# Patient Record
Sex: FEMALE | Race: AMERICAN INDIAN OR ALASKA NATIVE | NOT HISPANIC OR LATINO | Employment: OTHER | URBAN - METROPOLITAN AREA
[De-identification: names, ages, dates, MRNs, and addresses within clinical notes are randomized per-mention and may not be internally consistent; named-entity substitution may affect disease eponyms.]

---

## 2017-01-02 ENCOUNTER — OFFICE VISIT (OUTPATIENT)
Dept: INTERNAL MEDICINE | Facility: CLINIC | Age: 67
End: 2017-01-02
Payer: COMMERCIAL

## 2017-01-02 VITALS
TEMPERATURE: 97.8 F | WEIGHT: 130 LBS | SYSTOLIC BLOOD PRESSURE: 114 MMHG | BODY MASS INDEX: 20.36 KG/M2 | OXYGEN SATURATION: 95 % | HEART RATE: 90 BPM | DIASTOLIC BLOOD PRESSURE: 60 MMHG

## 2017-01-02 DIAGNOSIS — G89.4 CHRONIC PAIN SYNDROME: ICD-10-CM

## 2017-01-02 DIAGNOSIS — J44.9 CHRONIC OBSTRUCTIVE PULMONARY DISEASE, UNSPECIFIED COPD TYPE (H): ICD-10-CM

## 2017-01-02 DIAGNOSIS — K21.9 GASTROESOPHAGEAL REFLUX DISEASE WITHOUT ESOPHAGITIS: ICD-10-CM

## 2017-01-02 DIAGNOSIS — Z09 HOSPITAL DISCHARGE FOLLOW-UP: Primary | ICD-10-CM

## 2017-01-02 DIAGNOSIS — E11.51 TYPE 2 DIABETES MELLITUS WITH DIABETIC PERIPHERAL ANGIOPATHY WITHOUT GANGRENE, WITHOUT LONG-TERM CURRENT USE OF INSULIN (H): ICD-10-CM

## 2017-01-02 DIAGNOSIS — I10 ESSENTIAL HYPERTENSION WITH GOAL BLOOD PRESSURE LESS THAN 130/80: ICD-10-CM

## 2017-01-02 DIAGNOSIS — N12 PYELONEPHRITIS: ICD-10-CM

## 2017-01-02 PROCEDURE — 99495 TRANSJ CARE MGMT MOD F2F 14D: CPT | Performed by: INTERNAL MEDICINE

## 2017-01-02 RX ORDER — ALBUTEROL SULFATE 0.83 MG/ML
SOLUTION RESPIRATORY (INHALATION)
Qty: 360 ML | Refills: 11 | Status: SHIPPED | OUTPATIENT
Start: 2017-01-02 | End: 2019-05-29

## 2017-01-02 RX ORDER — LEVETIRACETAM 500 MG/1
500 TABLET ORAL 2 TIMES DAILY
Qty: 180 TABLET | Refills: 1 | Status: CANCELLED | OUTPATIENT
Start: 2017-01-02

## 2017-01-02 RX ORDER — SUCRALFATE ORAL 1 G/10ML
1 SUSPENSION ORAL
Qty: 1200 ML | Refills: 2 | Status: SHIPPED | OUTPATIENT
Start: 2017-01-02 | End: 2017-03-31

## 2017-01-02 RX ORDER — ALBUTEROL SULFATE 0.83 MG/ML
SOLUTION RESPIRATORY (INHALATION)
Qty: 360 ML | Refills: 1 | Status: CANCELLED | OUTPATIENT
Start: 2017-01-02

## 2017-01-02 NOTE — PROGRESS NOTES
SUBJECTIVE:                                                    Sonya Foote is a 66 year old female who presents to clinic today for the following health issues:    Hospital Follow-up Visit:    Hospital/Nursing Home/IP Rehab Facility: [facility]  Date of Admission: 12/22/16  Date of Discharge: 12/28/16  Reason(s) for Admission: Fever, dysuria             Problems taking medications regularly:  None       Medication changes since discharge: None       Problems adhering to non-medication therapy:  None    Summary of hospitalization:  See outside records, reviewed and scanned  Diagnostic Tests/Treatments reviewed.  Follow up needed: noted  Other Healthcare Providers Involved in Patient s Care:         None  Update since discharge: stable.     Post Discharge Medication Reconciliation: discharge medications reconciled, continue medications without change.  Plan of care communicated with patient     Coding guidelines for this visit:  Type of Medical   Decision Making Face-to-Face Visit       within 7 Days of discharge Face-to-Face Visit        within 14 days of discharge   Moderate Complexity [id number]   High Complexity 73107 [id number]                 Hospital Course by Problem:     Sepsis secondary to pyelonephritis - Started on ciprofloxacin IV on admission. Due to flank pain on exam, obtained renal U/S 12/22 which showed normal kidneys. Due to temperatures between 102-104 despite tylenol on HOD1 and intractable vomiting, broadened to vanc and cefepime (pencilllin allergy of hives and maybe respiratory compromise per history). Urine cultures from 12/22 showed Klebsiella >100k colonies/cc. Narrowed back to cipro IV 12/24 and discharged on oral cipro to complete through 12/29.    - Cont cipro for a 7 day course to complete on 12/28.    Chest pain secondary to suspected stress cardiomyopathy - Had chest pain of sudden onset that radiated to both arms the night of 12/25. LA was 4.2 so rapid response was  called. EKG showed possible ST elevation in V2. TTE showed new lower EF and minimal septal movement with preserved apical movement. Per cardiology, this pattern was not consistent with coronary anatomy and stress cardiomyopathy due to her infection. Metoprolol 25mg XL restarted 12/26.    - F/u Cardiology clinic in 1-3 weeks with TTE. Referral ordered.  - Restart lisinopril at home.    Persistent vomiting - Started the night of HOD1. Eventually controlled with scheduled pepcid, reglan, zofran, protonix, carafate, NPO status. AXR normal. CT abd/pelvis normal except for possible esophagitis. Well-controlled since 12/23 with scheduled oral pepcid, reglan, zofran, protonix, and sucrulfate. Tried to d/c reglan 12/26 but had nausea/vomiting again 12/27, so it was resumed. Ddx includes PUD (has a history of this, but has been controlled), gastroparesis (has DM2, resolved with reglan), and esophagitis (seen on CT done here 12/23). If EGD needs to be done, cardiology IP here cleared her for procedure.  - Discharged on scheduled reglan and zofran  - Repeat EKG within 7 days to check QTc  - F/u GI for persistent vomiting. Referral ordered.    Normocytic anemia - Hgb stable. PT has sickle cell trait. No signs of bleeding on history of exam. Iron supplement was held while she was septic.            Problem list and histories reviewed & adjusted, as indicated.  Additional history: as documented    Patient Active Problem List   Diagnosis     Hyperlipidemia LDL goal <100     Seizure disorder (H)     ACP (advance care planning)     Osteoporosis     Schizoaffective disorder (H)     AS (sickle cell trait) (H)     Vertigo     Person who has had sex change operation     Claudication in peripheral vascular disease (H)     Intestinal malabsorption     GIB (gastrointestinal bleeding)     Cervicalgia     Health Care Home     Anxiety state     Asthma     Adjustment disorder with depressed mood     Chronic pain syndrome     Open-angle glaucoma      Encounter for counseling     History of colonic polyps     Old myocardial infarction     Iron deficiency anemia     Late effects of cerebrovascular disease     Degeneration of intervertebral disc of lumbosacral region     Thoracic or lumbosacral neuritis or radiculitis     Cerebral infarction due to occlusion or stenosis of carotid artery     Disorder of bone and cartilage     Hereditary and idiopathic peripheral neuropathy     Androgen insensitivity syndrome     PAD (peripheral artery disease) (H)     Essential hypertension     Restless legs syndrome (RLS)     Chronic systolic congestive heart failure (H)     Carotid stenosis, left     Primary osteoarthritis of both hands     Pain in both upper extremities     Atherosclerotic peripheral vascular disease with rest pain (H)     Increased nausea and vomiting     Nausea & vomiting     Essential hypertension with goal blood pressure less than 130/80     Cellulitis of right ankle     Angina pectoris, crescendo (H)     Type 2 diabetes mellitus with diabetic peripheral angiopathy without gangrene, without long-term current use of insulin (H)     Anemia, unspecified type     Critical lower limb ischemia     Testicular feminization     Anxiety disorder due to general medical condition     Anxiety disorder     Central retinal artery occlusion     Lumbosacral radiculitis     Peripheral nerve disease (H)     Osteopenia     Prediabetes     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 (H)     Primary open angle glaucoma of both eyes, severe stage     Pseudophakia of right eye     Cataract, left eye     Diabetes mellitus type 2 without retinopathy (H)     Pyelonephritis     Gastroesophageal reflux disease without esophagitis     Past Surgical History   Procedure Laterality Date     Appendectomy       Tonsillectomy       Cholecystectomy       Orthopedic surgery       broken wrist repair     Breast surgery       right breast bx (benign)     Sinus surgery       cyst  removed     Esophagoscopy, gastroscopy, duodenoscopy (egd), combined  12/14/2012     Procedure: COMBINED ESOPHAGOSCOPY, GASTROSCOPY, DUODENOSCOPY (EGD), BIOPSY SINGLE OR MULTIPLE;  ESOPHAGOSCOPY, GASTROSCOPY, DUODENOSCOPY (EGD), DILATATION ;  Surgeon: Elizabeth Stevenson MD;  Location:  GI     Vascular surgery       Left carotid stent     Esophagoscopy, gastroscopy, duodenoscopy (egd), combined  12/31/2013     Procedure: COMBINED ESOPHAGOSCOPY, GASTROSCOPY, DUODENOSCOPY (EGD), BIOPSY SINGLE OR MULTIPLE;;  Surgeon: Clemente Lopez MD;  Location:  GI     Sex transformation surgery, male to female       1974     Esophagoscopy, gastroscopy, duodenoscopy (egd), combined  4/1/2014     Procedure: COMBINED ESOPHAGOSCOPY, GASTROSCOPY, DUODENOSCOPY (EGD);;  Surgeon: Clemente Lopez MD;  Location:  GI     Endarterectomy femoral  5/23/2014     Procedure: ENDARTERECTOMY FEMORAL;  Surgeon: Jason Joshi MD;  Location:  OR     Esophagoscopy, gastroscopy, duodenoscopy (egd), combined  6/28/2014     Procedure: COMBINED ESOPHAGOSCOPY, GASTROSCOPY, DUODENOSCOPY (EGD);  Surgeon: Clemente Lopez MD;  Location:  GI     Colonoscopy with co2 insufflation N/A 8/20/2014     Procedure: COLONOSCOPY WITH CO2 INSUFFLATION;  Surgeon: Duane, William Charles, MD;  Location: [facility]     Esophagoscopy, gastroscopy, duodenoscopy (egd), combined N/A 8/20/2014     Procedure: COMBINED ESOPHAGOSCOPY, GASTROSCOPY, DUODENOSCOPY (EGD), BIOPSY SINGLE OR MULTIPLE;  Surgeon: Duane, William Charles, MD;  Location: [facility]     Esophagoscopy, gastroscopy, duodenoscopy (egd), combined N/A 8/22/2014     Procedure: COMBINED ESOPHAGOSCOPY, GASTROSCOPY, DUODENOSCOPY (EGD), BIOPSY SINGLE OR MULTIPLE;  Surgeon: Mello Ferrer MD;  Location:  GI     Hc capsule endoscopy N/A 8/25/2014     Procedure: CAPSULE/PILL CAM ENDOSCOPY;  Surgeon: Remy Haskins MD;  [facility]     Colonoscopy N/A 8/25/2014     Procedure: COLONOSCOPY;  Surgeon: Nabil  Mello RAMOS MD;  [facility]: [facility]     Hc capsule endoscopy N/A 10/2/2014     Procedure: CAPSULE/PILL CAM ENDOSCOPY;  Surgeon: Remy Haskins MD;  [facility]     Esophagoscopy, gastroscopy, duodenoscopy (egd), combined N/A 10/2/2014     Procedure: COMBINED ESOPHAGOSCOPY, GASTROSCOPY, DUODENOSCOPY (EGD), BIOPSY SINGLE OR MULTIPLE;  Surgeon: Remy Haskins MD;  [facility]     Angiogram Bilateral 11/21/2014     Procedure: ANGIOGRAM;  Surgeon: Savannah Durant MD;  [facility]     Esophagoscopy, gastroscopy, duodenoscopy (egd), combined Left 12/15/2014     Procedure: COMBINED ESOPHAGOSCOPY, GASTROSCOPY, DUODENOSCOPY (EGD), BIOPSY SINGLE OR MULTIPLE;  Surgeon: Remy Haskins MD;  [facility]     Angiogram Left 1/16/2015     Procedure: ANGIOGRAM;  Surgeon: Savannah Durant MD;  [facility]     Esophagoscopy, gastroscopy, duodenoscopy (egd), combined N/A 2/25/2015     Procedure: COMBINED ENDOSCOPIC ULTRASOUND, ESOPHAGOSCOPY, GASTROSCOPY, DUODENOSCOPY (EGD), FINE NEEDLE ASPIRATE/BIOPSY;  Surgeon: Clemente Lugo MD;  [facility]     Esophagoscopy, gastroscopy, duodenoscopy (egd), combined Left 2/25/2015     Procedure: COMBINED ESOPHAGOSCOPY, GASTROSCOPY, DUODENOSCOPY (EGD), BIOPSY SINGLE OR MULTIPLE;  Surgeon: Clemente Lugo MD;  [facility]     Angiogram Bilateral 9/14/2015     Procedure: ANGIOGRAM;  Surgeon: Savannah Durant MD;  [facility]     Angiogram Left 10/12/2015     Procedure: ANGIOGRAM;  Surgeon: Savannah Durant MD;  [facility]     Amputate toe(s) Right 1/5/2016     Procedure: AMPUTATE TOE(S);  Surgeon: Mello Gaines DPM;  Location:  [facility]     Amputate leg above knee Left 6/11/2016     Procedure: AMPUTATE LEG ABOVE KNEE;  Surgeon: Mello Rodriguez MD;  [facility]     Fasciotomy lower extremity Left 6/10/2016     Procedure: FASCIOTOMY LOWER EXTREMITY;  Surgeon: Mello Rodriguez MD;  [facility]     Angiogram Right 6/6/2016      Procedure: ANGIOGRAM;  Surgeon: Savannah Durant MD;  [facility]     Angioplasty Right 6/6/2016     Procedure: ANGIOPLASTY;  Surgeon: Savannah Durant MD;  [facility]     Esophagoscopy, gastroscopy, duodenoscopy (egd), combined N/A 9/25/2016     Procedure: COMBINED ESOPHAGOSCOPY, GASTROSCOPY, DUODENOSCOPY (EGD);  Surgeon: Aziza Patiño MD;  [facility]     Amputate leg below knee Right 11/7/2016     Procedure: AMPUTATE LEG BELOW KNEE;  Surgeon: Savannah Durant MD;  [facility]     Amputate revision stump lower extremity Right 11/11/2016     Procedure: AMPUTATE REVISION STUMP LOWER EXTREMITY;  Surgeon: Savannah Durant MD;  [facility]     Amputate revision stump lower extremity Right 11/16/2016     Procedure: AMPUTATE REVISION STUMP LOWER EXTREMITY;  Surgeon: Savannah Durant MD;  [facility]       Social History   Substance Use Topics     Smoking status: Former Smoker -- 2.50 packs/day for 30 years     Types: Cigarettes, Cigars     Quit date: 11/01/2000     Smokeless tobacco: Never Used     Alcohol Use: No     Family History   Problem Relation Age of Onset     CANCER Maternal Aunt      leukemia     Schizophrenia Brother      Depression Brother      Suicide Sister      Depression Sister      DIABETES Sister      Dementia Mother      Glaucoma Mother      DIABETES Mother      Coronary Artery Disease Mother      MI     CANCER Maternal Aunt      ovarian     Glaucoma Father      DIABETES Father      Heart Failure Father      Glaucoma Maternal Grandmother      DIABETES Maternal Grandmother      Glaucoma Maternal Grandfather      DIABETES Maternal Grandfather      Glaucoma Paternal Grandmother      DIABETES Paternal Grandmother      Glaucoma Paternal Grandfather      DIABETES Paternal Grandfather      Breast Cancer Sister      CEREBROVASCULAR DISEASE Brother          Current Outpatient Prescriptions   Medication Sig Dispense Refill     ondansetron (ZOFRAN-ODT) 4 MG ODT tab Take 1 tablet (4 mg) by mouth every 6  hours 120 tablet 0     metoprolol (TOPROL-XL) 25 MG 24 hr tablet Take 1 tablet (25 mg) by mouth daily 30 tablet 0     metoclopramide (REGLAN) 5 MG tablet Take 1 tablet (5 mg) by mouth 3 times daily (before meals) 240 tablet 0     pantoprazole (PROTONIX) 40 MG EC tablet Take 1 tablet (40 mg) by mouth 2 times daily Then QD 60 tablet 0     famotidine (PEPCID) 20 MG tablet Take 1 tablet (20 mg) by mouth 2 times daily 60 tablet 0     CYANOCOBALAMIN PO Take 2,000 mcg by mouth daily       gabapentin (NEURONTIN) 300 MG capsule Take 1 capsule (300 mg) by mouth 3 times daily 90 capsule 3     Nutritional Supplements (RENÉE) PACK Take 1 packet by mouth 2 times daily       polyethylene glycol (MIRALAX/GLYCOLAX) powder Take 17 g by mouth daily       fluticasone (FLONASE) 50 MCG/ACT nasal spray Spray 1 spray into both nostrils daily       ADVAIR DISKUS 250-50 MCG/DOSE diskus inhaler Inhale 2 puffs into the lungs daily       calcium citrate-vitamin D (CITRACAL) 315-250 MG-UNIT TABS Take 2 tablets by mouth daily 120 tablet 5     pregabalin (LYRICA) 75 MG capsule Take 1 capsule (75 mg) by mouth 2 times daily (takes at 8 AM and 8 PM) 90 capsule 3     ferrous sulfate (IRON) 325 (65 FE) MG tablet Take 1 tablet (325 mg) by mouth 2 times daily With meals 60 tablet 2     hydrochlorothiazide (MICROZIDE) 12.5 MG capsule Take 1 capsule (12.5 mg) by mouth daily 90 capsule 3     lisinopril (PRINIVIL,ZESTRIL) 2.5 MG tablet Take 1 tablet (2.5 mg) by mouth daily 90 tablet 3     escitalopram (LEXAPRO) 20 MG tablet One per day 90 tablet 3     estradiol (ESTRACE) 1 MG tablet Take 1 tablet (1 mg) by mouth daily 90 tablet 3     levETIRAcetam (KEPPRA) 500 MG tablet Take 1 tablet (500 mg) by mouth 2 times daily (Patient taking differently: Take 500 mg by mouth 3 times daily ) 180 tablet 1     traZODone (DESYREL) 100 MG tablet Take 1 tablet (100 mg) by mouth At Bedtime 90 tablet 3     metFORMIN (GLUCOPHAGE) 500 MG tablet Take 1 tablet twice daily 60  tablet 3     rOPINIRole (REQUIP) 0.25 MG tablet Take 3 tablets (0.75 mg) by mouth At Bedtime 270 tablet 1     aspirin EC 81 MG EC tablet Take 1 tablet (81 mg) by mouth daily 90 tablet 3     clopidogrel (PLAVIX) 75 MG tablet Take 1 tablet (75 mg) by mouth daily (Patient taking differently: Take 75 mg by mouth At Bedtime ) 90 tablet 3     risperiDONE (RISPERDAL) 1 MG tablet Take 0.5 tablets (0.5 mg) by mouth At Bedtime 90 tablet 0     sucralfate (CARAFATE) 1 GM/10ML suspension Take 10 mLs (1 g) by mouth 4 times daily (before meals and nightly) 1200 mL 2     blood glucose monitoring (ONE TOUCH ULTRA 2) meter device kit Use to test blood sugars 3 times daily or as directed. 1 kit 0     blood glucose monitoring (ONE TOUCH ULTRA) test strip Use to test blood sugars 3 times daily or as directed. 3 Box 3     blood glucose monitoring (ONE TOUCH ULTRASOFT) lancets Use to test blood sugar 3 times daily or as directed. 3 Box 3     phenytoin 200 MG CAPS Take 200 mg by mouth 2 times daily (Patient taking differently: Take 200 mg by mouth 2 times daily (takes at 8 AM and 8 PM)) 60 capsule 0     DOCUSATE SODIUM PO Take 100 mg by mouth daily       tamsulosin (FLOMAX) 0.4 MG 24 hr capsule Take 0.4 mg by mouth At Bedtime       dorzolamide (TRUSOPT) 2 % ophthalmic solution Place 1 drop into both eyes 2 times daily        diazepam (VALIUM) 5 MG tablet Take 1 tablet (5 mg) by mouth every 6 hours as needed for anxiety 20 tablet 1     SPIRIVA HANDIHALER 18 MCG inhalation capsule INHALE THE CONTENTS OF 1 CAPSULE VIA INHALATION DEVICE DAILY 30 capsule 2     zinc 50 MG TABS Take 1 tablet by mouth daily       cetirizine (ZYRTEC) 10 MG tablet Take 1 tablet (10 mg) by mouth every evening 90 tablet 3     albuterol (2.5 MG/3ML) 0.083% nebulizer solution INHALE 1 VIAL VIA NEBULIZER EVERY 6 HOURS AS NEEDED (Patient taking differently: INHALE 1 VIAL VIA NEBULIZER 3 times daily) 360 mL 1     nitroglycerin (NITROSTAT) 0.4 MG SL tablet Place 1 tablet  (0.4 mg) under the tongue every 5 minutes as needed for chest pain if you are still having symptoms after 3 doses (15 minutes) call 911. 25 tablet 1     MEDICATION GIVEN BY INTRATHECAL PUMP - INSTRUCTION continuous 4/11/2016 per [facility] in [place] [phone]:  Conc: Bupivacaine 20 mg/mL and Fentanyl 2000 mcg/mL.  Continuous: Bupivacaine 5.052 mg/day and Fentanyl 505.2 mcg/day.  Boluses: Up to 7 boluses per 24-hr period of Bupivicaine 0.5992 mg and Fentanyl 59.9 mcg  Max daily dose: Bupivacaine 9.1973 mg/day and Fentanyl 919.5883 mcg/day    Pump Last Fill Date:  6/30/2016  Pump Refill Date: 9/20/2016       latanoprost (XALATAN) 0.005 % ophthalmic solution Place 1 drop into both eyes At Bedtime 2.5 mL 11     atorvastatin (LIPITOR) 40 MG tablet Take 1 tablet (40 mg) by mouth daily (Patient taking differently: Take 40 mg by mouth At Bedtime ) 90 tablet 3     lactulose (CHRONULAC) 10 GM/15ML solution Take 30 mLs (20 g) by mouth daily as needed for constipation 946 mL 11     order for DME Equipment being ordered: Glucerna daily shakes with each meal 1 Box 11     thin (NO BRAND SPECIFIED) lancets Use to test blood sugar 3 times daily or as directed. 1 Box 10     ORDER FOR DME Equipment being ordered: Nebulizer and supplies 1 Units 0     ORDER FOR DME Equipment being ordered: Power Wheelchair 1 Device 0     ORDER FOR DME Equipment being ordered: Depends briefs 1 Month 11     EASY TOUCH LANCETS 30G/TWIST MISC        Cholecalciferol (VITAMIN D) 2000 UNITS tablet Take 2,000 Units by mouth daily. 100 tablet 3     Multiple Vitamin (MULTIVITAMIN OR) Take 1 tablet by mouth daily        HYDROmorphone (DILAUDID) 2 MG tablet Take 1 tablet (2 mg) by mouth every 3 hours as needed for moderate to severe pain 30 tablet 0     prochlorperazine (COMPAZINE) 10 MG tablet Take 1 tablet (10 mg) by mouth every 8 hours as needed 20 tablet 0     Allergies   Allergen Reactions     Bee Venom      Penicillins  Anaphylaxis     Other reaction(s): Skin Rash and/or Hives     Dilantin [Phenytoin] Other (See Comments)     Generic dilantin only per pt     Iodide Hives     09/11/15: Pt states she can use iodine and doesn't have any problems      Iodine-131      Novocaine [Procaine] Hives     Other reaction(s): Skin Rash and/or Hives     BP Readings from Last 3 Encounters:   12/28/16 115/67   12/21/16 122/74   12/14/16 95/57    Wt Readings from Last 3 Encounters:   12/28/16 131 lb 13.4 oz (59.8 kg)   12/14/16 125 lb (56.7 kg)   12/10/16 125 lb (56.7 kg)            Labs reviewed in EPIC    ROS:  C: NEGATIVE for fever, chills, change in weight  E/M: NEGATIVE for ear, mouth and throat problems  R: NEGATIVE for significant cough or SOB  CV: NEGATIVE for chest pain, palpitations or peripheral edema  GI: NEGATIVE for nausea, abdominal pain, heartburn, or change in bowel habits  : NEGATIVE for frequency, dysuria, or hematuria  H: NEGATIVE for bleeding problems  P: NEGATIVE for changes in mood or affect    OBJECTIVE:                                                    /60 mmHg  Pulse 90  Temp(Src) 97.8  F (36.6  C) (Oral)  Wt 130 lb (58.968 kg)  SpO2 95%  Body mass index is 20.36 kg/(m^2).  GENERAL:  alert and no distress in wheelchair per baseline   RESP: lungs clear to auscultation - no rales, no rhonchi, no wheezes  CV: regular rates and rhythm, normal S1 S2, no S3 or S4 and no click or rub   MS: noted bilateral BKA's  PSYCH: Alert and oriented times 3; speech- coherent , normal rate and volume; able to articulate logical thoughts, able to abstract reason, no tangential thoughts, no hallucinations or delusions, affect- normal       ASSESSMENT/PLAN:                                                    (Z09) Hospital discharge follow-up  (primary encounter diagnosis)  Comment: stable as noted  Plan:     (N12) Pyelonephritis  Comment: stable post therapy  Plan:     (I10) Essential hypertension with goal blood pressure less than  130/80  Comment: at goal on therapy  Plan:     (G89.4) Chronic pain syndrome  Comment: discussed with patient, attempt weening off Dilaudid prescribed per surgery  Plan:     (J44.9) Chronic obstructive pulmonary disease, unspecified COPD type (H)  Comment: refilled per request  Plan: albuterol (2.5 MG/3ML) 0.083% neb solution            (K21.9) Gastroesophageal reflux disease without esophagitis  Comment: as dosed QID  Plan: sucralfate (CARAFATE) 1 GM/10ML suspension            (E11.51) Type 2 diabetes mellitus with diabetic peripheral angiopathy without gangrene, without long-term current use of insulin (H)  Comment:   A1C      4.9   6/6/2016  A1C      4.8   8/11/2015  A1C      4.7   3/6/2015  A1C      4.8   3/2/2015  A1C      5.2   4/1/2014  Plan: order for DME        Stable on therapy      See Patient Instructions    Allan Casey MD  [facility]    THE MEDICATION LIST HAS BEEN FULLY RECONCILED BY THE M.D. AND THE NURSING STAFF.

## 2017-01-02 NOTE — NURSING NOTE
"Chief Complaint   Patient presents with     Hospital F/U       Initial /60 mmHg  Pulse 90  Temp(Src) 97.8  F (36.6  C) (Oral)  Wt 130 lb (58.968 kg)  SpO2 95% Estimated body mass index is 20.36 kg/(m^2) as calculated from the following:    Height as of 12/22/16: 5' 7\" (1.702 m).    Weight as of this encounter: 130 lb (58.968 kg).  BP completed using cuff size: mukesh Hancock CMA      "

## 2017-01-03 ENCOUNTER — CARE COORDINATION (OUTPATIENT)
Dept: GERIATRIC MEDICINE | Facility: CLINIC | Age: 67
End: 2017-01-03

## 2017-01-03 ENCOUNTER — TELEPHONE (OUTPATIENT)
Dept: INTERNAL MEDICINE | Facility: CLINIC | Age: 67
End: 2017-01-03

## 2017-01-03 NOTE — PROGRESS NOTES
1/2/16: Member's MA is showing inactive - unable to enter in MMIS.  CM placed call to [facility] - spoke with Karen - he states all paperwork is in and reinstated member's MA.    MERLINE Bonilla, entered MMIS successfully.      CM spoke with member to inform her that her MA was reactive.      Member agreed to schedule [facility] worker. CM placed call to [facility] and  at [phone] - spoke with Citlali and scheduled with [facility] worker, Emily Goss for 1/24 at 12:30.   There is also a 1x requirement for nurse visit scheduled for 1/26 at 1:30 with Radha Castro.    Relayed this to member - sent letter to member with appt times.     Jamie Sharma RN, N  [employer]

## 2017-01-03 NOTE — PROGRESS NOTES
Mailed copy of care plan to client.  As requested/needed:  emailed CPS to Phyll[facility] for [facility], updated services in access as needed and submitted appropriate referrals/auths, and entered MMIS. Chart was returned to CM.     Mailed copy of CL tool to client, faxed copy to [facility] facility, uploaded into MN[employer] and emailed copy to [facility]yll[facility] for auth.    Irene Lemosatoyoselyn  Case Management Specialist   [facility]   [phone]

## 2017-01-03 NOTE — TELEPHONE ENCOUNTER
Reason for Call:  Home Health Care    Elizabeth S with [facility] called regarding (reason for call): Skilled Nursing / Pt / OT / Speech Therapy    Orders are needed for this patient. all    PT: Eval & Treat    OT: Eval & Treat   &   Speech therapy:  Eval & Treat    Skilled Nursin times a week for two weeks.  1 time a week for two weeks.  5 times as needed.    Pt Provider: maya warren    Phone Number Homecare Nurse can be reached at: [phone] before 9:00pm today.  (If after, please call [phone] - main office - ask for clinic coordinator    Can we leave a detailed message on this number? YES    Phone number patient can be reached at: today before 9:00pm [phone].  After 9:00pm today/forward - [phone]    Best Time: before 9:00pm - see different ph contact information listed above if after 9pm today    Call taken on 1/3/2017 at 5:12 PM by Gris Muñiz

## 2017-01-04 ENCOUNTER — TRANSFERRED RECORDS (OUTPATIENT)
Dept: HEALTH INFORMATION MANAGEMENT | Facility: CLINIC | Age: 67
End: 2017-01-04

## 2017-01-06 ENCOUNTER — DOCUMENTATION ONLY (OUTPATIENT)
Dept: CARE COORDINATION | Facility: CLINIC | Age: 67
End: 2017-01-06

## 2017-01-06 NOTE — PROGRESS NOTES
[facility] now requests orders and shares plan of care/discharge summaries for some patients through [employer].  Please REPLY TO THIS MESSAGE in order to give authorization for orders when needed.  This is considered a verbal order, you will still receive a faxed copy of orders for signature.  Thank you for your assistance in improving collaboration for our patients.    Ms Foote is on several medications that are contraindicated  Reglan with Compazine  Reglan with Resperidol    The following could have severe interactions  Dorzolamide-Timolol with Epipen  Epipen with metoprolol  Lexapro with Reglan    This is for your information to correct. Our protocol is to let you know.

## 2017-01-09 ENCOUNTER — DOCUMENTATION ONLY (OUTPATIENT)
Dept: CARE COORDINATION | Facility: CLINIC | Age: 67
End: 2017-01-09

## 2017-01-09 ENCOUNTER — OFFICE VISIT (OUTPATIENT)
Dept: PULMONOLOGY | Facility: CLINIC | Age: 67
End: 2017-01-09
Attending: INTERNAL MEDICINE
Payer: COMMERCIAL

## 2017-01-09 VITALS
WEIGHT: 135 LBS | HEIGHT: 67 IN | TEMPERATURE: 97.8 F | RESPIRATION RATE: 18 BRPM | DIASTOLIC BLOOD PRESSURE: 80 MMHG | HEART RATE: 82 BPM | OXYGEN SATURATION: 96 % | SYSTOLIC BLOOD PRESSURE: 124 MMHG | BODY MASS INDEX: 21.19 KG/M2

## 2017-01-09 DIAGNOSIS — J41.0 SIMPLE CHRONIC BRONCHITIS (H): Primary | ICD-10-CM

## 2017-01-09 PROCEDURE — 99212 OFFICE O/P EST SF 10 MIN: CPT | Mod: ZF

## 2017-01-09 ASSESSMENT — PAIN SCALES - GENERAL: PAINLEVEL: EXTREME PAIN (8)

## 2017-01-09 NOTE — NURSING NOTE
Chief Complaint   Patient presents with     COPD     Follow up with Sonya concerning her COPD     Allan Pastor CMA at 10:27 AM on 1/9/2017

## 2017-01-09 NOTE — MR AVS SNAPSHOT
After Visit Summary   1/9/2017    Sonya Foote    MRN: [id number]           Patient Information     Date Of Birth          1950        Visit Information        Provider Department      1/9/2017 10:30 AM Alina Jennings MD [facility]        Today's Diagnoses     Simple chronic bronchitis (H)    -  1       Care Instructions    It was nice to see you again today.    Your chronic bronchitis seems stable. I would keep using your medications as written (Advair twice a day, Spiriva once a day, and Nebulizer 4 times a day).    I would recommend restarting your CPAP at the previous settings. It will help your breathing, your energy, and your heart.    Please schedule a follow up with your Cardiologist (Dr. Anderson) as discussed. Your heart failure looks to be under good control today.    Please get a chest xray before you go- your last chest xray had a lot of congestion and I want to make sure that you are getting back to normal.    Alina Jennings         Follow-ups after your visit        Follow-up notes from your care team     Return in about 6 months (around 7/9/2017).      Your next 10 appointments already scheduled     Jan 09, 2017 11:35 AM   (Arrive by 11:20 AM)   XR CHEST 2 VIEWS with UCXR1   [facility] [place] [facility] ([facility])    [address] [place] [id number] [phone]           Please bring a list of your current medicines to your exam. (Include vitamins, minerals and over-thecounter medicines.) Leave your valuables at home.  Tell your doctor if there is a chance you may be pregnant.  You do not need to do anything special for this exam.            Jan 12, 2017  4:40 PM   (Arrive by 4:25 PM)   Return Vascular Visit with Savannah Durant MD   [facility] ([facility])    [address] [place] [id number] [phone]             Jan 18, 2017   Procedure with Clemente Lopez MD   [facility] [place] Endoscopy ([facility])    [address] [address] [phone]           The [facility] is located on the corner of [address] and [address] on the [facility]. It is easily accessible from virtually any point in the [place] area, via [address] and [address].            Mar 03, 2017 10:50 AM   (Arrive by 10:20 AM)   RETURN DIABETES with Michelle Irizarry MD   [facility] ([facility])    [address] [id number] [phone]            Jul 10, 2017 11:00 AM   (Arrive by 10:45 AM)   Return Visit with Alina Jennings MD   [facility] ([facility])    [address] [id number] [phone]              Future tests that were ordered for you today     Open Future Orders        Priority Expected Expires Ordered    X-ray Chest 2 vws* Routine 1/9/2017 1/9/2018 1/9/2017            Who to contact     If you have questions or need follow up information about today's clinic visit or your schedule please contact [facility] directly at [phone].  Normal or non-critical lab and imaging results will be communicated to you by MyChart, letter or phone within 4 business days after the clinic has received the results. If you do not hear from us within 7 days, please contact the clinic through MyChart or phone. If you have a critical or abnormal lab result, we will notify you by phone as soon as possible.  Submit refill requests through [employer] or call your pharmacy and they will forward the refill request to us. Please allow 3 business days for your refill to be completed.          Additional Information About Your Visit        MyChart Information  "    [employer] lets you send messages to your doctor, view your test results, renew your prescriptions, schedule appointments and more. To sign up, go to [email or web address] . Click on \"Log in\" on the left side of the screen, which will take you to the Welcome page. Then click on \"Sign up Now\" on the right side of the page.     You will be asked to enter the access code listed below, as well as some personal information. Please follow the directions to create your username and password.     Your access code is: [email or web address]  Expires: 3/26/2017  6:30 AM     Your access code will  in 90 days. If you need help or a new code, please call your [place] clinic or [phone].        Care EveryWhere ID     This is your Care EveryWhere ID. This could be used by other organizations to access your [place] medical records  [id number]        Your Vitals Were     Pulse Temperature Respirations Height BMI (Body Mass Index) Pulse Oximetry    82 97.8  F (36.6  C) (Oral) 18 1.702 m (5' 7\") 21.14 kg/m2 96%       Blood Pressure from Last 3 Encounters:   17 124/80   17 114/60   16 115/67    Weight from Last 3 Encounters:   17 61.236 kg (135 lb)   17 58.968 kg (130 lb)   16 59.8 kg (131 lb 13.4 oz)                 Today's Medication Changes          These changes are accurate as of: 17 11:11 AM.  If you have any questions, ask your nurse or doctor.               These medicines have changed or have updated prescriptions.        Dose/Directions    atorvastatin 40 MG tablet   Commonly known as:  LIPITOR   This may have changed:  when to take this   Used for:  Hyperlipidemia LDL goal <100        Dose:  40 mg   Take 1 tablet (40 mg) by mouth daily   Quantity:  90 tablet   Refills:  3       clopidogrel 75 MG tablet   Commonly known as:  PLAVIX   This may have changed:  when to take this   Used for:  PAD (peripheral artery disease) (H), UGI bleed, Osteoporosis, Nausea        Dose:  75 mg "   Take 1 tablet (75 mg) by mouth daily   Quantity:  90 tablet   Refills:  3       levETIRAcetam 500 MG tablet   Commonly known as:  KEPPRA   This may have changed:  when to take this   Used for:  Nausea        Dose:  500 mg   Take 1 tablet (500 mg) by mouth 2 times daily   Quantity:  180 tablet   Refills:  1       Phenytoin Sodium Extended 200 MG Caps   This may have changed:  additional instructions   Used for:  Seizure disorder (H)        Dose:  200 mg   Take 200 mg by mouth 2 times daily   Quantity:  60 capsule   Refills:  0                Primary Care Provider Office Phone # Fax #    Allan Casey [phone]       [facility] [address] [place] [address]        Thank you!     Thank you for choosing [facility]  for your care. Our goal is always to provide you with excellent care. Hearing back from our patients is one way we can continue to improve our services. Please take a few minutes to complete the written survey that you may receive in the mail after your visit with us. Thank you!             Your Updated Medication List - Protect others around you: Learn how to safely use, store and throw away your medicines at www.disposemymeds.org.          This list is accurate as of: 1/9/17 11:11 AM.  Always use your most recent med list.                   Brand Name Dispense Instructions for use    ADVAIR DISKUS 250-50 MCG/DOSE diskus inhaler   Generic drug:  fluticasone-salmeterol      Inhale 2 puffs into the lungs daily       albuterol (2.5 MG/3ML) 0.083% neb solution     360 mL    INHALE 1 VIAL VIA NEBULIZER EVERY 6 HOURS AS NEEDED       aspirin 81 MG EC tablet     90 tablet    Take 1 tablet (81 mg) by mouth daily       atorvastatin 40 MG tablet    LIPITOR    90 tablet    Take 1 tablet (40 mg) by mouth daily       blood glucose monitoring meter device kit     1 kit    Use to test blood sugars 3 times daily or as directed.       blood glucose  monitoring test strip    ONE TOUCH ULTRA    3 Box    Use to test blood sugars 3 times daily or as directed.       calcium citrate-vitamin D 315-250 MG-UNIT Tabs per tablet    CITRACAL    120 tablet    Take 2 tablets by mouth daily       cetirizine 10 MG tablet    zyrTEC    90 tablet    Take 1 tablet (10 mg) by mouth every evening       clopidogrel 75 MG tablet    PLAVIX    90 tablet    Take 1 tablet (75 mg) by mouth daily       CYANOCOBALAMIN PO      Take 2,000 mcg by mouth daily       diazepam 5 MG tablet    VALIUM    20 tablet    Take 1 tablet (5 mg) by mouth every 6 hours as needed for anxiety       DOCUSATE SODIUM PO      Take 100 mg by mouth daily       dorzolamide 2 % ophthalmic solution    TRUSOPT     Place 1 drop into both eyes 2 times daily       * EASY TOUCH LANCETS 30G/TWIST Misc          * thin lancets    NO BRAND SPECIFIED    1 Box    Use to test blood sugar 3 times daily or as directed.       * blood glucose monitoring lancets     3 Box    Use to test blood sugar 3 times daily or as directed.       escitalopram 20 MG tablet    LEXAPRO    90 tablet    One per day       estradiol 1 MG tablet    ESTRACE    90 tablet    Take 1 tablet (1 mg) by mouth daily       famotidine 20 MG tablet    PEPCID    60 tablet    Take 1 tablet (20 mg) by mouth 2 times daily       ferrous sulfate 325 (65 FE) MG tablet    IRON    60 tablet    Take 1 tablet (325 mg) by mouth 2 times daily With meals       FLOMAX 0.4 MG capsule   Generic drug:  tamsulosin      Take 0.4 mg by mouth At Bedtime       fluticasone 50 MCG/ACT spray    FLONASE     Spray 1 spray into both nostrils daily       gabapentin 300 MG capsule    NEURONTIN    90 capsule    Take 1 capsule (300 mg) by mouth 3 times daily       hydrochlorothiazide 12.5 MG capsule    MICROZIDE    90 capsule    Take 1 capsule (12.5 mg) by mouth daily       HYDROmorphone 2 MG tablet    DILAUDID    30 tablet    Take 1 tablet (2 mg) by mouth every 3 hours as needed for moderate to severe  pain       RENÉE Pack      Take 1 packet by mouth 2 times daily       lactulose 10 GM/15ML solution    CHRONULAC    946 mL    Take 30 mLs (20 g) by mouth daily as needed for constipation       latanoprost 0.005 % ophthalmic solution    XALATAN    2.5 mL    Place 1 drop into both eyes At Bedtime       levETIRAcetam 500 MG tablet    KEPPRA    180 tablet    Take 1 tablet (500 mg) by mouth 2 times daily       lisinopril 2.5 MG tablet    PRINIVIL/Zestril    90 tablet    Take 1 tablet (2.5 mg) by mouth daily       MEDICATION GIVEN BY INTRATHECAL PUMP - INSTRUCTION      continuous 4/11/2016 per [facility] in [place] [phone]: Conc: Bupivacaine 20 mg/mL and Fentanyl 2000 mcg/mL. Continuous: Bupivacaine 5.052 mg/day and Fentanyl 505.2 mcg/day. Boluses: Up to 7 boluses per 24-hr period of Bupivicaine 0.5992 mg and Fentanyl 59.9 mcg Max daily dose: Bupivacaine 9.1973 mg/day and Fentanyl 919.5883 mcg/day  Pump Last Fill Date:  6/30/2016 Pump Refill Date: 9/20/2016       metFORMIN 500 MG tablet    GLUCOPHAGE    60 tablet    Take 1 tablet twice daily       metoclopramide 5 MG tablet    REGLAN    240 tablet    Take 1 tablet (5 mg) by mouth 3 times daily (before meals)       metoprolol 25 MG 24 hr tablet    TOPROL-XL    30 tablet    Take 1 tablet (25 mg) by mouth daily       MULTIVITAMIN PO      Take 1 tablet by mouth daily       nitroglycerin 0.4 MG sublingual tablet    NITROSTAT    25 tablet    Place 1 tablet (0.4 mg) under the tongue every 5 minutes as needed for chest pain if you are still having symptoms after 3 doses (15 minutes) call 911.       ondansetron 4 MG ODT tab    ZOFRAN-ODT    120 tablet    Take 1 tablet (4 mg) by mouth every 6 hours       * order for DME     1 Month    Equipment being ordered: Depends briefs       * order for DME     1 Device    Equipment being ordered: Power Wheelchair       * order for DME     1 Units    Equipment being ordered: Nebulizer and supplies       *  order for DME     1 Box    Equipment being ordered: Glucerna daily shakes with each meal       * order for DME     1 Device    ACcu check BID       pantoprazole 40 MG EC tablet    PROTONIX    60 tablet    Take 1 tablet (40 mg) by mouth 2 times daily Then QD       Phenytoin Sodium Extended 200 MG Caps     60 capsule    Take 200 mg by mouth 2 times daily       polyethylene glycol powder    MIRALAX/GLYCOLAX     Take 17 g by mouth daily       pregabalin 75 MG capsule    LYRICA    90 capsule    Take 1 capsule (75 mg) by mouth 2 times daily (takes at 8 AM and 8 PM)       prochlorperazine 10 MG tablet    COMPAZINE    20 tablet    Take 1 tablet (10 mg) by mouth every 8 hours as needed       risperiDONE 1 MG tablet    risperDAL    90 tablet    Take 0.5 tablets (0.5 mg) by mouth At Bedtime       rOPINIRole 0.25 MG tablet    REQUIP    270 tablet    Take 3 tablets (0.75 mg) by mouth At Bedtime       SPIRIVA HANDIHALER 18 MCG capsule   Generic drug:  tiotropium     30 capsule    INHALE THE CONTENTS OF 1 CAPSULE VIA INHALATION DEVICE DAILY       sucralfate 1 GM/10ML suspension    CARAFATE    1200 mL    Take 10 mLs (1 g) by mouth 4 times daily (before meals and nightly)       traZODone 100 MG tablet    DESYREL    90 tablet    Take 1 tablet (100 mg) by mouth At Bedtime       vitamin D 2000 UNITS tablet     100 tablet    Take 2,000 Units by mouth daily.       zinc 50 MG Tabs      Take 1 tablet by mouth daily       * Notice:  This list has 8 medication(s) that are the same as other medications prescribed for you. Read the directions carefully, and ask your doctor or other care provider to review them with you.

## 2017-01-09 NOTE — PROGRESS NOTES
[facility] now requests orders and shares plan of care/discharge summaries for some patients through [employer].  Please REPLY TO THIS MESSAGE in order to give authorization for orders when needed.  This is considered a verbal order, you will still receive a faxed copy of orders for signature.  Thank you for your assistance in improving collaboration for our patients.    ORDER    OT evaluation completed. Requesting 4 visits this month to address  strengthening and breathing techniques/energy conservation.

## 2017-01-09 NOTE — Clinical Note
1/9/2017      RE: Sonya Foote  [address]   [place] [address]       CHIEF COMPLAINT:  Chronic bronchitis.      HISTORY OF PRESENT ILLNESS:  The patient is a 66-year-old woman previously seen by me in 03/2015 for chronic bronchitis.  She had previously been seen at [facility], and has been consolidating her care at the [facility] since then.  She reports mostly stable issues with chronic bronchitis.  She does cough on a daily basis.  Most of the time her sputum is clear and easy to mobilize.  She has been on fluticasone/salmeterol, Tiotropium, and is using a nebulizer 4 times a day.  She does report an increase in shortness of breath since her recent hospitalization.  She says she is short of breath with minimal activity such as transferring.  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  She has been home from the rehabilitation center for about a week.  She does transfer on her home, but she lives in an assisted living facility where a significant amount of help is available for her.  The assisted living facility helps administer her medications, helps her shower, empties her commode and can help her to the toilet if necessary.  They also make her bed and do some light housekeeping for her.  She says that despite being short of breath her oxygen levels have mostly been within the normal range.  She was tested during her hospitalization but was not discharged from the hospital with oxygen.  She does have a partner who is on supplemental oxygen and uses her partner's oxygen from time to time.  She has been on a stable water pill.  She is not able to follow daily weights, but does not report any increase in swelling.  She does have a chronic 3 pillow orthopnea but no change recently.      PAST MEDICAL HISTORY:   1.  CVA x3, first in 2005, most recent in 2012.   2.  Seizure disorder.   3.  Heart failure with preserved ejection fraction.    4.  Diabetes.   5.  Hypertension.   6.  Hyperlipidemia.   7.  Chronic bronchitis.   8.  Sickle trait.   9.  Peripheral artery disease.   10.  Coronary artery disease with MI in 09/2016.   11.  Duodenal ulcer.   12.  GERD.   13.  Transgender.   14.  JOSE, on CPAP, not currently using.      FAMILY HISTORY:  Brother has schizophrenia and major depressive disorder.  Sister has diabetes and major depression.  Mother had diabetes and Alzheimer and father had diabetes.      SOCIAL HISTORY:  Has approximately 75-pack-year smoking history, quit in 2000.  She moved from [place] to [place] in 2012, lives in an assisted living facility with her partner.      REVIEW OF SYSTEMS:  A full 14-point review of systems was done and is negative except as noted above in the HPI.      PHYSICAL EXAMINATION:   VITAL SIGNS:  Blood pressure 124/80, pulse is 82, respiratory rate is 18, SpO2 is 96% on room air.  BMI is 21.14.   GENERAL APPEARANCE:  Elderly woman in no distress.   NECK: JVP not appreciated with pt sitting at 90 degrees.  RESPIRATORY:  Good air movement bilaterally.  No wheezes.   CARDIOVASCULAR:  Regular rate and rhythm, normal S1, S2, no murmurs, rubs or gallops.      RESULTS:  Echo from shows LVEF of 35% -40%, inferior akinesis.  Right ventricular function appears normal.  Most recent spirometry shows it was normal with a low DLCO in 03/2015.      Most recent chest x-ray from the hospital shows bilateral increase in bilateral ground-glass opacification (my interpretation).      ASSESSMENT AND PLAN:  The patient is a 66-year-old woman here for a followup of her chronic bronchitis.     1.  Chronic bronchitis.  The patient has a longstanding history of chronic bronchitis as evidenced by chronic daily sputum production in the setting of previous tobacco use.  She is on a stable inhaler package of Fluticasone, salmeterol and Tiotropium.  She is using her nebulizers 4 times a day.  She has been checking her oxygen and has been  staying within a safe range and is not currently needing oxygen supplementation.  I would make no adjustments in her current inhaler package and would encourage her to be as active as she is able.  She is not reporting an increase in shortness of breath.  Although in the setting of recent hospitalization and a new method of transferring, I think her shortness of breath is within the expected range.     2.  Abnormal chest x-ray.  The patient had a chest x-ray during her most recent hospitalization that shows bilateral ground-glass opacification.  I would like to repeat a chest x-ray today to ensure resolution of those changes.  I suspect that they are related to volume overload in the setting of heart failure with preserved ejection fraction and recent MI.  However, I would prefer to confirm that this is improved.  I see no evidence of volume overload on my exam today and her JVP is flat with her sitting upright at 90 degrees.  We will follow up on the chest x-ray and if additional evaluation is required, I will contact the patient.      I will have her come back and see me in approximately 6 months.  In the meantime, she will continue to follow with her other providers as necessary.      I spent 30 minutes in this patient's care encounter, of which 50% was in counseling and coordination of care.         POP WOOD MD             D: 2017 12:19   T: 2017 13:49   MT: HAWK      Name:     SHARATH MERCEDES   MRN:      [id number]        Account:      [id number]   :      1950           Service Date: 2017      Document: [id number]

## 2017-01-09 NOTE — PROGRESS NOTES
CHIEF COMPLAINT:  Chronic bronchitis.      HISTORY OF PRESENT ILLNESS:  The patient is a 66-year-old woman previously seen by me in 03/2015 for chronic bronchitis.  She had previously been seen at [facility], and has been consolidating her care at the [facility] since then.  She reports mostly stable issues with chronic bronchitis.  She does cough on a daily basis.  Most of the time her sputum is clear and easy to mobilize.  She has been on fluticasone/salmeterol, Tiotropium, and is using a nebulizer 4 times a day.  She does report an increase in shortness of breath since her recent hospitalization.  She says she is short of breath with minimal activity such as transferring.  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  She has been home from the rehabilitation center for about a week.  She does transfer on her home, but she lives in an assisted living facility where a significant amount of help is available for her.  The assisted living facility helps administer her medications, helps her shower, empties her commode and can help her to the toilet if necessary.  They also make her bed and do some light housekeeping for her.  She says that despite being short of breath her oxygen levels have mostly been within the normal range.  She was tested during her hospitalization but was not discharged from the hospital with oxygen.  She does have a partner who is on supplemental oxygen and uses her partner's oxygen from time to time.  She has been on a stable water pill.  She is not able to follow daily weights, but does not report any increase in swelling.  She does have a chronic 3 pillow orthopnea but no change recently.      PAST MEDICAL HISTORY:   1.  CVA x3, first in 2005, most recent in 2012.   2.  Seizure disorder.   3.  Heart failure with preserved ejection fraction.   4.  Diabetes.   5.  Hypertension.   6.  Hyperlipidemia.   7.  Chronic bronchitis.   8.   Sickle trait.   9.  Peripheral artery disease.   10.  Coronary artery disease with MI in 09/2016.   11.  Duodenal ulcer.   12.  GERD.   13.  Transgender.   14.  JOSE, on CPAP, not currently using.      FAMILY HISTORY:  Brother has schizophrenia and major depressive disorder.  Sister has diabetes and major depression.  Mother had diabetes and Alzheimer and father had diabetes.      SOCIAL HISTORY:  Has approximately 75-pack-year smoking history, quit in 2000.  She moved from [place] to [place] in 2012, lives in an assisted living facility with her partner.      REVIEW OF SYSTEMS:  A full 14-point review of systems was done and is negative except as noted above in the HPI.      PHYSICAL EXAMINATION:   VITAL SIGNS:  Blood pressure 124/80, pulse is 82, respiratory rate is 18, SpO2 is 96% on room air.  BMI is 21.14.   GENERAL APPEARANCE:  Elderly woman in no distress.   NECK: JVP not appreciated with pt sitting at 90 degrees.  RESPIRATORY:  Good air movement bilaterally.  No wheezes.   CARDIOVASCULAR:  Regular rate and rhythm, normal S1, S2, no murmurs, rubs or gallops.      RESULTS:  Echo from shows LVEF of 35% -40%, inferior akinesis.  Right ventricular function appears normal.  Most recent spirometry shows it was normal with a low DLCO in 03/2015.      Most recent chest x-ray from the hospital shows bilateral increase in bilateral ground-glass opacification (my interpretation).      ASSESSMENT AND PLAN:  The patient is a 66-year-old woman here for a followup of her chronic bronchitis.     1.  Chronic bronchitis.  The patient has a longstanding history of chronic bronchitis as evidenced by chronic daily sputum production in the setting of previous tobacco use.  She is on a stable inhaler package of Fluticasone, salmeterol and Tiotropium.  She is using her nebulizers 4 times a day.  She has been checking her oxygen and has been staying within a safe range and is not currently needing oxygen supplementation.  I would  make no adjustments in her current inhaler package and would encourage her to be as active as she is able.  She is not reporting an increase in shortness of breath.  Although in the setting of recent hospitalization and a new method of transferring, I think her shortness of breath is within the expected range.     2.  Abnormal chest x-ray.  The patient had a chest x-ray during her most recent hospitalization that shows bilateral ground-glass opacification.  I would like to repeat a chest x-ray today to ensure resolution of those changes.  I suspect that they are related to volume overload in the setting of heart failure with preserved ejection fraction and recent MI.  However, I would prefer to confirm that this is improved.  I see no evidence of volume overload on my exam today and her JVP is flat with her sitting upright at 90 degrees.  We will follow up on the chest x-ray and if additional evaluation is required, I will contact the patient.      I will have her come back and see me in approximately 6 months.  In the meantime, she will continue to follow with her other providers as necessary.      I spent 30 minutes in this patient's care encounter, of which 50% was in counseling and coordination of care.         POP WOOD MD             D: 2017 12:19   T: 2017 13:49   MT: HAWK      Name:     SHARATH MERCEDES   MRN:      [id number]        Account:      [id number]   :      1950           Service Date: 2017      Document: [id number]

## 2017-01-09 NOTE — PATIENT INSTRUCTIONS
It was nice to see you again today.    Your chronic bronchitis seems stable. I would keep using your medications as written (Advair twice a day, Spiriva once a day, and Nebulizer 4 times a day).    I would recommend restarting your CPAP at the previous settings. It will help your breathing, your energy, and your heart.    Please schedule a follow up with your Cardiologist (Dr. Anderson) as discussed. Your heart failure looks to be under good control today.    Please get a chest xray before you go- your last chest xray had a lot of congestion and I want to make sure that you are getting back to normal.    Alina Jennings

## 2017-01-16 ENCOUNTER — OFFICE VISIT (OUTPATIENT)
Dept: VASCULAR SURGERY | Facility: CLINIC | Age: 67
End: 2017-01-16

## 2017-01-16 VITALS — DIASTOLIC BLOOD PRESSURE: 64 MMHG | SYSTOLIC BLOOD PRESSURE: 98 MMHG | HEART RATE: 55 BPM

## 2017-01-16 DIAGNOSIS — M79.2 NEUROPATHIC PAIN OF RIGHT LOWER EXTREMITY: Primary | ICD-10-CM

## 2017-01-16 RX ORDER — OXYCODONE AND ACETAMINOPHEN 5; 325 MG/1; MG/1
TABLET ORAL
Qty: 60 TABLET | Refills: 0 | Status: SHIPPED | OUTPATIENT
Start: 2017-01-16 | End: 2017-03-31

## 2017-01-16 ASSESSMENT — PAIN SCALES - GENERAL: PAINLEVEL: EXTREME PAIN (8)

## 2017-01-16 NOTE — Clinical Note
1/16/2017       RE: Sonya Foote  [address]   [place] [facility] [address]     Dear Colleague,    Thank you for referring your patient, Sonya Foote, to the [facility] at [facility]. Please see a copy of my visit note below.    I am Seeing Sonya Foote back after right AKA revision on 11/16/16 for severe PAD.  She still complains of right stump pain and inside swelling.  Had her percocet changed to every 4 hours which is not managing pain control.  In good spirits overall.    BP 98/64 mmHg  Pulse 55      General Appearance: Patient appears well with no distress.      Gross Neuro:  Alert and Oriented.  Appropriate.  Extremities: right stump sutures removed, incision C/D/I       Plan/Assessment:  Follow up prn  Will change percocet back to every 3 hours, will provide her with one month RX  Provide referral to current back pain specialist to help manage her stump pain      Maria Eugenia DOW, CNS  Division of Vascular Surgery  [facility]  Pager [phone]      I have seen and examined this patient with JAMEEL Elizabeth and agree with her attached note.  Follow up prn at this point.    I spent >50% of this 15 min visit in counseling    Again, thank you for allowing me to participate in the care of your patient.      Sincerely,    Savannah Duarnt MD

## 2017-01-16 NOTE — PROGRESS NOTES
I am Seeing Sonya beach after right AKA revision on 11/16/16 for severe PAD.  She still complains of right stump pain and inside swelling.  Had her percocet changed to every 4 hours which is not managing pain control.  In good spirits overall.    BP 98/64 mmHg  Pulse 55      General Appearance: Patient appears well with no distress.      Gross Neuro:  Alert and Oriented.  Appropriate.  Extremities: right stump sutures removed, incision C/D/I       Plan/Assessment:  Follow up prn  Will change percocet back to every 3 hours, will provide her with one month RX  Provide referral to current back pain specialist to help manage her stump pain      Maria Eugenia DOW, CNS  Division of Vascular Surgery  [facility]  Pager [phone]

## 2017-01-16 NOTE — NURSING NOTE
"Chief Complaint   Patient presents with     Vascular Disease     Sonya is here for a follow up on her right leg amputation. Sonya states she's having \"burning pain\"     Haile Curtis LPN    "

## 2017-01-17 ENCOUNTER — CARE COORDINATION (OUTPATIENT)
Dept: GERIATRIC MEDICINE | Facility: CLINIC | Age: 67
End: 2017-01-17

## 2017-01-17 NOTE — PROGRESS NOTES
1/17/17: Cm received call from member -she was checking in to let CM know that she is doing well.   [facility] - member has dr leach on 1/24 - asked CM to reschedule appt.  CM placed call to [facility], spoke with Cindy.  Rescheduled for 2/8 with Emily Goss 9-11 a.m.  Nurse visit rescheduled for 3/3 11-12:30.   CM relayed this to member- she put on her calendar.     Jamie Sharma, RN, N  [facility]

## 2017-01-18 ENCOUNTER — SURGERY (OUTPATIENT)
Age: 67
End: 2017-01-18

## 2017-01-18 RX ADMIN — BENZOCAINE 2 SPRAY: 220 SPRAY, METERED PERIODONTAL at 10:55

## 2017-01-18 RX ADMIN — MIDAZOLAM HYDROCHLORIDE 1 MG: 1 INJECTION, SOLUTION INTRAMUSCULAR; INTRAVENOUS at 10:56

## 2017-01-18 RX ADMIN — FENTANYL CITRATE 25 MCG: 50 INJECTION, SOLUTION INTRAMUSCULAR; INTRAVENOUS at 10:55

## 2017-01-18 RX ADMIN — FENTANYL CITRATE 25 MCG: 50 INJECTION, SOLUTION INTRAMUSCULAR; INTRAVENOUS at 10:57

## 2017-01-18 RX ADMIN — MIDAZOLAM HYDROCHLORIDE 1 MG: 1 INJECTION, SOLUTION INTRAMUSCULAR; INTRAVENOUS at 10:57

## 2017-01-19 ENCOUNTER — CARE COORDINATION (OUTPATIENT)
Dept: GERIATRIC MEDICINE | Facility: CLINIC | Age: 67
End: 2017-01-19

## 2017-01-19 NOTE — PROGRESS NOTES
1/19/17: CM placed call to member to f/u on EGD with biopsy yesterday.  Member states it went well and will know the results in a few weeks.  Member states they are quarantined at the [facility] as their is a stomach virus going on around - she is planning to stay in her home as she cannot afford to get sick.     Jamie Sharma RN, N  [facility]

## 2017-01-19 NOTE — PROGRESS NOTES
1/19/17: CM received vm from ULISES Shine with  [facility] stating that she ordered member a CPAP power cord with [facility] however it was not covered and asked CM to track and follow.  CM placed call to [facility] - spoke with Bessie - she stated that the power cord is not in stock and is a special order.  Bessie states that it is not covered under [facility] and that member put on credit card.  Discussed payment with [facility].  Bessie checked with billing and they will reimburse client and bill under [facility] once they received waiver approval form.  CM updated [employer] and sent to CMS for auth.      CM placed call to Fátima - left vm letting her know.  CM relayed information to member.     Jamie Sharma RN, PHN  [facility]

## 2017-01-24 ENCOUNTER — TELEPHONE (OUTPATIENT)
Dept: INTERNAL MEDICINE | Facility: CLINIC | Age: 67
End: 2017-01-24

## 2017-01-24 ENCOUNTER — OFFICE VISIT (OUTPATIENT)
Dept: INTERNAL MEDICINE | Facility: CLINIC | Age: 67
End: 2017-01-24
Payer: COMMERCIAL

## 2017-01-24 VITALS
SYSTOLIC BLOOD PRESSURE: 113 MMHG | BODY MASS INDEX: 21.14 KG/M2 | TEMPERATURE: 98.3 F | WEIGHT: 135 LBS | OXYGEN SATURATION: 96 % | HEART RATE: 81 BPM | DIASTOLIC BLOOD PRESSURE: 66 MMHG

## 2017-01-24 DIAGNOSIS — E11.51 TYPE 2 DIABETES MELLITUS WITH DIABETIC PERIPHERAL ANGIOPATHY WITHOUT GANGRENE, WITHOUT LONG-TERM CURRENT USE OF INSULIN (H): Primary | ICD-10-CM

## 2017-01-24 DIAGNOSIS — Z89.511 STATUS POST BELOW KNEE AMPUTATION OF RIGHT LOWER EXTREMITY (H): ICD-10-CM

## 2017-01-24 DIAGNOSIS — G89.4 CHRONIC PAIN SYNDROME: ICD-10-CM

## 2017-01-24 DIAGNOSIS — D50.9 IRON DEFICIENCY ANEMIA, UNSPECIFIED IRON DEFICIENCY ANEMIA TYPE: ICD-10-CM

## 2017-01-24 DIAGNOSIS — M79.89 MASS OF SOFT TISSUE: ICD-10-CM

## 2017-01-24 DIAGNOSIS — I10 ESSENTIAL HYPERTENSION WITH GOAL BLOOD PRESSURE LESS THAN 130/80: ICD-10-CM

## 2017-01-24 LAB
ALBUMIN SERPL-MCNC: 3 G/DL (ref 3.4–5)
ALP SERPL-CCNC: 162 U/L (ref 40–150)
ALT SERPL W P-5'-P-CCNC: 35 U/L (ref 0–50)
ANION GAP SERPL CALCULATED.3IONS-SCNC: 7 MMOL/L (ref 3–14)
AST SERPL W P-5'-P-CCNC: 29 U/L (ref 0–45)
BILIRUB SERPL-MCNC: 0.1 MG/DL (ref 0.2–1.3)
BUN SERPL-MCNC: 9 MG/DL (ref 7–30)
CALCIUM SERPL-MCNC: 9.1 MG/DL (ref 8.5–10.1)
CHLORIDE SERPL-SCNC: 108 MMOL/L (ref 94–109)
CO2 SERPL-SCNC: 27 MMOL/L (ref 20–32)
CREAT SERPL-MCNC: 0.45 MG/DL (ref 0.52–1.04)
ERYTHROCYTE [DISTWIDTH] IN BLOOD BY AUTOMATED COUNT: 19.3 % (ref 10–15)
GFR SERPL CREATININE-BSD FRML MDRD: ABNORMAL ML/MIN/1.7M2
GLUCOSE SERPL-MCNC: 72 MG/DL (ref 70–99)
HBA1C MFR BLD: 4.1 % (ref 4.3–6)
HCT VFR BLD AUTO: 33.6 % (ref 35–47)
HGB BLD-MCNC: 10.5 G/DL (ref 11.7–15.7)
MCH RBC QN AUTO: 29.5 PG (ref 26.5–33)
MCHC RBC AUTO-ENTMCNC: 31.3 G/DL (ref 31.5–36.5)
MCV RBC AUTO: 94 FL (ref 78–100)
PLATELET # BLD AUTO: 320 10E9/L (ref 150–450)
POTASSIUM SERPL-SCNC: 3.9 MMOL/L (ref 3.4–5.3)
PROT SERPL-MCNC: 7.4 G/DL (ref 6.8–8.8)
RBC # BLD AUTO: 3.56 10E12/L (ref 3.8–5.2)
SODIUM SERPL-SCNC: 142 MMOL/L (ref 133–144)
WBC # BLD AUTO: 12.4 10E9/L (ref 4–11)

## 2017-01-24 PROCEDURE — 80053 COMPREHEN METABOLIC PANEL: CPT | Performed by: INTERNAL MEDICINE

## 2017-01-24 PROCEDURE — 36415 COLL VENOUS BLD VENIPUNCTURE: CPT | Performed by: INTERNAL MEDICINE

## 2017-01-24 PROCEDURE — 83036 HEMOGLOBIN GLYCOSYLATED A1C: CPT | Performed by: INTERNAL MEDICINE

## 2017-01-24 PROCEDURE — 85027 COMPLETE CBC AUTOMATED: CPT | Performed by: INTERNAL MEDICINE

## 2017-01-24 PROCEDURE — 99214 OFFICE O/P EST MOD 30 MIN: CPT | Performed by: INTERNAL MEDICINE

## 2017-01-24 RX ORDER — LIDOCAINE 50 MG/G
PATCH TOPICAL
Qty: 30 PATCH | Refills: 1 | Status: SHIPPED | OUTPATIENT
Start: 2017-01-24 | End: 2017-03-09

## 2017-01-24 NOTE — NURSING NOTE
"Chief Complaint   Patient presents with     Diabetes       Initial /66 mmHg  Pulse 81  Temp(Src) 98.3  F (36.8  C) (Oral)  Wt 135 lb (61.236 kg)  SpO2 96% Estimated body mass index is 21.14 kg/(m^2) as calculated from the following:    Height as of 1/9/17: 5' 7\" (1.702 m).    Weight as of this encounter: 135 lb (61.236 kg).  BP completed using cuff size: mukesh Hancock CMA      "

## 2017-01-24 NOTE — PROGRESS NOTES
I have seen and examined this patient with JAMEEL Elizabeth and agree with her attached note.  Follow up prn at this point.    I spent >50% of this 15 min visit in counseling

## 2017-01-24 NOTE — PROGRESS NOTES
SUBJECTIVE:                                                    Sonya Foote is a 66 year old female who presents to clinic today for the following health issues    Diabetes Follow-up    Patient is checking blood sugars: twice daily.  Results are as follows: 160-90    Diabetic concerns: None     Symptoms of hypoglycemia (low blood sugar): none     Paresthesias (numbness or burning in feet) or sores: No     Date of last diabetic eye exam: 2016       Amount of exercise or physical activity: None    Problems taking medications regularly: No    Medication side effects: none    Diet: regular (no restrictions)    Other concerns:  1. Masses on both hips x 2 weeks   2. Requesting order for lidocane patches  3. Requesting hgb is checked today    Problem list and histories reviewed & adjusted, as indicated.  Additional history: as documented    Patient Active Problem List   Diagnosis     Hyperlipidemia LDL goal <100     Seizure disorder (H)     ACP (advance care planning)     Osteoporosis     Schizoaffective disorder (H)     AS (sickle cell trait) (H)     Vertigo     Person who has had sex change operation     Claudication in peripheral vascular disease (H)     Intestinal malabsorption     GIB (gastrointestinal bleeding)     Cervicalgia     Health Care Home     Asthma     Adjustment disorder with depressed mood     Chronic pain syndrome     Open-angle glaucoma     Encounter for counseling     History of colonic polyps     Old myocardial infarction     Iron deficiency anemia     Late effects of cerebrovascular disease     Degeneration of intervertebral disc of lumbosacral region     Thoracic or lumbosacral neuritis or radiculitis     Cerebral infarction due to occlusion or stenosis of carotid artery     Disorder of bone and cartilage     Hereditary and idiopathic peripheral neuropathy     Androgen insensitivity syndrome     PAD (peripheral artery disease) (H)     Essential hypertension     Restless legs syndrome (RLS)      Chronic systolic congestive heart failure (H)     Carotid stenosis, left     Primary osteoarthritis of both hands     Pain in both upper extremities     Atherosclerotic peripheral vascular disease with rest pain (H)     Increased nausea and vomiting     Nausea & vomiting     Essential hypertension with goal blood pressure less than 130/80     Cellulitis of right ankle     Angina pectoris, crescendo (H)     Type 2 diabetes mellitus with diabetic peripheral angiopathy without gangrene, without long-term current use of insulin (H)     Anemia, unspecified type     Critical lower limb ischemia     Testicular feminization     Anxiety disorder due to general medical condition     Anxiety disorder     Central retinal artery occlusion     Lumbosacral radiculitis     Peripheral nerve disease (H)     Osteopenia     Prediabetes     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 (H)     Primary open angle glaucoma of both eyes, severe stage     Pseudophakia of right eye     Cataract, left eye     Diabetes mellitus type 2 without retinopathy (H)     Pyelonephritis     Gastroesophageal reflux disease without esophagitis     Chronic obstructive pulmonary disease, unspecified COPD type (H)     Past Surgical History   Procedure Laterality Date     Appendectomy       Tonsillectomy       Cholecystectomy       Orthopedic surgery       broken wrist repair     Breast surgery       right breast bx (benign)     Sinus surgery       cyst removed     Esophagoscopy, gastroscopy, duodenoscopy (egd), combined  12/14/2012     Procedure: COMBINED ESOPHAGOSCOPY, GASTROSCOPY, DUODENOSCOPY (EGD), BIOPSY SINGLE OR MULTIPLE;  ESOPHAGOSCOPY, GASTROSCOPY, DUODENOSCOPY (EGD), DILATATION ;  Surgeon: Elizabeth Stevenson MD;  Location:  GI     Vascular surgery       Left carotid stent     Esophagoscopy, gastroscopy, duodenoscopy (egd), combined  12/31/2013     Procedure: COMBINED ESOPHAGOSCOPY, GASTROSCOPY, DUODENOSCOPY (EGD), BIOPSY SINGLE OR  MULTIPLE;;  Surgeon: Clemente Lopez MD;  Location:  GI     Sex transformation surgery, male to female       1974     Esophagoscopy, gastroscopy, duodenoscopy (egd), combined  4/1/2014     Procedure: COMBINED ESOPHAGOSCOPY, GASTROSCOPY, DUODENOSCOPY (EGD);;  Surgeon: Clemente Lopez MD;  [facility]     Endarterectomy femoral  5/23/2014     Procedure: ENDARTERECTOMY FEMORAL;  Surgeon: Jason Joshi MD;  [facility]     Esophagoscopy, gastroscopy, duodenoscopy (egd), combined  6/28/2014     Procedure: COMBINED ESOPHAGOSCOPY, GASTROSCOPY, DUODENOSCOPY (EGD);  Surgeon: Clemente Lopez MD;  [facility]     Colonoscopy with co2 insufflation N/A 8/20/2014     Procedure: COLONOSCOPY WITH CO2 INSUFFLATION;  Surgeon: Duane, William Charles, MD;  Location: [facility]     Esophagoscopy, gastroscopy, duodenoscopy (egd), combined N/A 8/20/2014     Procedure: COMBINED ESOPHAGOSCOPY, GASTROSCOPY, DUODENOSCOPY (EGD), BIOPSY SINGLE OR MULTIPLE;  Surgeon: Duane, William Charles, MD;  Location: [facility]     Esophagoscopy, gastroscopy, duodenoscopy (egd), combined N/A 8/22/2014     Procedure: COMBINED ESOPHAGOSCOPY, GASTROSCOPY, DUODENOSCOPY (EGD), BIOPSY SINGLE OR MULTIPLE;  Surgeon: Mello Ferrer MD;  Location:  GI     Hc capsule endoscopy N/A 8/25/2014     Procedure: CAPSULE/PILL CAM ENDOSCOPY;  Surgeon: Remy Haskins MD;  [facility]     Colonoscopy N/A 8/25/2014     Procedure: COLONOSCOPY;  Surgeon: Mello Ferrer MD;  [facility]     Hc capsule endoscopy N/A 10/2/2014     Procedure: CAPSULE/PILL CAM ENDOSCOPY;  Surgeon: Remy Haskins MD;  [facility]     Esophagoscopy, gastroscopy, duodenoscopy (egd), combined N/A 10/2/2014     Procedure: COMBINED ESOPHAGOSCOPY, GASTROSCOPY, DUODENOSCOPY (EGD), BIOPSY SINGLE OR MULTIPLE;  Surgeon: Remy Haskins MD;  [facility]     Angiogram Bilateral 11/21/2014     Procedure: ANGIOGRAM;  Surgeon: Savannah Durant MD;  [facility]      Esophagoscopy, gastroscopy, duodenoscopy (egd), combined Left 12/15/2014     Procedure: COMBINED ESOPHAGOSCOPY, GASTROSCOPY, DUODENOSCOPY (EGD), BIOPSY SINGLE OR MULTIPLE;  Surgeon: Remy Hasknis MD;  [facility]     Angiogram Left 1/16/2015     Procedure: ANGIOGRAM;  Surgeon: Savannah Durant MD;  [facility]     Esophagoscopy, gastroscopy, duodenoscopy (egd), combined N/A 2/25/2015     Procedure: COMBINED ENDOSCOPIC ULTRASOUND, ESOPHAGOSCOPY, GASTROSCOPY, DUODENOSCOPY (EGD), FINE NEEDLE ASPIRATE/BIOPSY;  Surgeon: Clemente Lugo MD;  [facility]     Esophagoscopy, gastroscopy, duodenoscopy (egd), combined Left 2/25/2015     Procedure: COMBINED ESOPHAGOSCOPY, GASTROSCOPY, DUODENOSCOPY (EGD), BIOPSY SINGLE OR MULTIPLE;  Surgeon: Clemente Lugo MD;  [facility]     Angiogram Bilateral 9/14/2015     Procedure: ANGIOGRAM;  Surgeon: Savannah Durant MD;  [facility]     Angiogram Left 10/12/2015     Procedure: ANGIOGRAM;  Surgeon: Savannah Durant MD;  [facility]     Amputate toe(s) Right 1/5/2016     Procedure: AMPUTATE TOE(S);  Surgeon: Mello Gaines DPM;  Location:  [facility]     Amputate leg above knee Left 6/11/2016     Procedure: AMPUTATE LEG ABOVE KNEE;  Surgeon: Mello Rodriguez MD;  [facility]     Fasciotomy lower extremity Left 6/10/2016     Procedure: FASCIOTOMY LOWER EXTREMITY;  Surgeon: Mello Rodriguez MD;  [facility]     Angiogram Right 6/6/2016     Procedure: ANGIOGRAM;  Surgeon: Savannah Durant MD;  [facility]     Angioplasty Right 6/6/2016     Procedure: ANGIOPLASTY;  Surgeon: Savannah Durant MD;  [facility]     Esophagoscopy, gastroscopy, duodenoscopy (egd), combined N/A 9/25/2016     Procedure: COMBINED ESOPHAGOSCOPY, GASTROSCOPY, DUODENOSCOPY (EGD);  Surgeon: Aziza Patiño MD;  [facility]     Amputate leg below knee Right 11/7/2016     Procedure: AMPUTATE LEG BELOW KNEE;  Surgeon: Savannah Durant MD;  Location:  OR     Amputate  revision stump lower extremity Right 11/11/2016     Procedure: AMPUTATE REVISION STUMP LOWER EXTREMITY;  Surgeon: Savannah Durant MD;  [facility]     Amputate revision stump lower extremity Right 11/16/2016     Procedure: AMPUTATE REVISION STUMP LOWER EXTREMITY;  Surgeon: Savannah Durant MD;  [facility]     Esophagoscopy, gastroscopy, duodenoscopy (egd), combined N/A 1/18/2017     Procedure: COMBINED ESOPHAGOSCOPY, GASTROSCOPY, DUODENOSCOPY (EGD), BIOPSY SINGLE OR MULTIPLE;  Surgeon: Clemente Lopez MD;  [facility]       Social History   Substance Use Topics     Smoking status: Former Smoker -- 2.50 packs/day for 30 years     Types: Cigarettes, Cigars     Quit date: 11/01/2000     Smokeless tobacco: Never Used     Alcohol Use: No     Family History   Problem Relation Age of Onset     CANCER Maternal Aunt      leukemia     Schizophrenia Brother      Depression Brother      Suicide Sister      Depression Sister      DIABETES Sister      Dementia Mother      Glaucoma Mother      DIABETES Mother      Coronary Artery Disease Mother      MI     CANCER Maternal Aunt      ovarian     Glaucoma Father      DIABETES Father      Heart Failure Father      Glaucoma Maternal Grandmother      DIABETES Maternal Grandmother      Glaucoma Maternal Grandfather      DIABETES Maternal Grandfather      Glaucoma Paternal Grandmother      DIABETES Paternal Grandmother      Glaucoma Paternal Grandfather      DIABETES Paternal Grandfather      Breast Cancer Sister      CEREBROVASCULAR DISEASE Brother          Current Outpatient Prescriptions   Medication Sig Dispense Refill     oxyCODONE-acetaminophen (PERCOCET) 5-325 MG per tablet Take 1-2 tablets every 3 hours as needed for pain. 60 tablet 0     albuterol (2.5 MG/3ML) 0.083% neb solution INHALE 1 VIAL VIA NEBULIZER EVERY 6 HOURS AS NEEDED 360 mL 11     sucralfate (CARAFATE) 1 GM/10ML suspension Take 10 mLs (1 g) by mouth 4 times daily (before meals and nightly) 1200 mL 2      order for DME [facility] check BID 1 Device 0     ondansetron (ZOFRAN-ODT) 4 MG ODT tab Take 1 tablet (4 mg) by mouth every 6 hours 120 tablet 0     metoprolol (TOPROL-XL) 25 MG 24 hr tablet Take 1 tablet (25 mg) by mouth daily 30 tablet 0     metoclopramide (REGLAN) 5 MG tablet Take 1 tablet (5 mg) by mouth 3 times daily (before meals) 240 tablet 0     pantoprazole (PROTONIX) 40 MG EC tablet Take 1 tablet (40 mg) by mouth 2 times daily Then QD 60 tablet 0     famotidine (PEPCID) 20 MG tablet Take 1 tablet (20 mg) by mouth 2 times daily 60 tablet 0     CYANOCOBALAMIN PO Take 2,000 mcg by mouth daily       gabapentin (NEURONTIN) 300 MG capsule Take 1 capsule (300 mg) by mouth 3 times daily 90 capsule 3     Nutritional Supplements (RENÉE) PACK Take 1 packet by mouth 2 times daily       polyethylene glycol (MIRALAX/GLYCOLAX) powder Take 17 g by mouth daily       HYDROmorphone (DILAUDID) 2 MG tablet Take 1 tablet (2 mg) by mouth every 3 hours as needed for moderate to severe pain 30 tablet 0     fluticasone (FLONASE) 50 MCG/ACT nasal spray Spray 1 spray into both nostrils daily       ADVAIR DISKUS 250-50 MCG/DOSE diskus inhaler Inhale 2 puffs into the lungs daily       calcium citrate-vitamin D (CITRACAL) 315-250 MG-UNIT TABS Take 2 tablets by mouth daily 120 tablet 5     pregabalin (LYRICA) 75 MG capsule Take 1 capsule (75 mg) by mouth 2 times daily (takes at 8 AM and 8 PM) 90 capsule 3     ferrous sulfate (IRON) 325 (65 FE) MG tablet Take 1 tablet (325 mg) by mouth 2 times daily With meals 60 tablet 2     hydrochlorothiazide (MICROZIDE) 12.5 MG capsule Take 1 capsule (12.5 mg) by mouth daily 90 capsule 3     lisinopril (PRINIVIL,ZESTRIL) 2.5 MG tablet Take 1 tablet (2.5 mg) by mouth daily 90 tablet 3     escitalopram (LEXAPRO) 20 MG tablet One per day 90 tablet 3     estradiol (ESTRACE) 1 MG tablet Take 1 tablet (1 mg) by mouth daily 90 tablet 3     levETIRAcetam (KEPPRA) 500 MG tablet Take 1 tablet (500 mg) by mouth 2  times daily (Patient taking differently: Take 500 mg by mouth 3 times daily ) 180 tablet 1     traZODone (DESYREL) 100 MG tablet Take 1 tablet (100 mg) by mouth At Bedtime 90 tablet 3     metFORMIN (GLUCOPHAGE) 500 MG tablet Take 1 tablet twice daily 60 tablet 3     rOPINIRole (REQUIP) 0.25 MG tablet Take 3 tablets (0.75 mg) by mouth At Bedtime 270 tablet 1     aspirin EC 81 MG EC tablet Take 1 tablet (81 mg) by mouth daily 90 tablet 3     clopidogrel (PLAVIX) 75 MG tablet Take 1 tablet (75 mg) by mouth daily (Patient taking differently: Take 75 mg by mouth At Bedtime ) 90 tablet 3     risperiDONE (RISPERDAL) 1 MG tablet Take 0.5 tablets (0.5 mg) by mouth At Bedtime 90 tablet 0     blood glucose monitoring (ONE TOUCH ULTRA 2) meter device kit Use to test blood sugars 3 times daily or as directed. 1 kit 0     blood glucose monitoring (ONE TOUCH ULTRA) test strip Use to test blood sugars 3 times daily or as directed. 3 Box 3     blood glucose monitoring (ONE TOUCH ULTRASOFT) lancets Use to test blood sugar 3 times daily or as directed. 3 Box 3     phenytoin 200 MG CAPS Take 200 mg by mouth 2 times daily (Patient taking differently: Take 200 mg by mouth 2 times daily (takes at 8 AM and 8 PM)) 60 capsule 0     DOCUSATE SODIUM PO Take 100 mg by mouth daily       tamsulosin (FLOMAX) 0.4 MG 24 hr capsule Take 0.4 mg by mouth At Bedtime       dorzolamide (TRUSOPT) 2 % ophthalmic solution Place 1 drop into both eyes 2 times daily        diazepam (VALIUM) 5 MG tablet Take 1 tablet (5 mg) by mouth every 6 hours as needed for anxiety 20 tablet 1     SPIRIVA HANDIHALER 18 MCG inhalation capsule INHALE THE CONTENTS OF 1 CAPSULE VIA INHALATION DEVICE DAILY 30 capsule 2     zinc 50 MG TABS Take 1 tablet by mouth daily       cetirizine (ZYRTEC) 10 MG tablet Take 1 tablet (10 mg) by mouth every evening 90 tablet 3     nitroglycerin (NITROSTAT) 0.4 MG SL tablet Place 1 tablet (0.4 mg) under the tongue every 5 minutes as needed for  chest pain if you are still having symptoms after 3 doses (15 minutes) call 911. 42 tablet 1     MEDICATION GIVEN BY INTRATHECAL PUMP - INSTRUCTION continuous 4/11/2016 per [facility] in [place] [phone]:  Conc: Bupivacaine 20 mg/mL and Fentanyl 2000 mcg/mL.  Continuous: Bupivacaine 5.052 mg/day and Fentanyl 505.2 mcg/day.  Boluses: Up to 7 boluses per 24-hr period of Bupivicaine 0.5992 mg and Fentanyl 59.9 mcg  Max daily dose: Bupivacaine 9.1973 mg/day and Fentanyl 919.5883 mcg/day    Pump Last Fill Date:  6/30/2016  Pump Refill Date: 9/20/2016       latanoprost (XALATAN) 0.005 % ophthalmic solution Place 1 drop into both eyes At Bedtime 2.5 mL 11     atorvastatin (LIPITOR) 40 MG tablet Take 1 tablet (40 mg) by mouth daily (Patient taking differently: Take 40 mg by mouth At Bedtime ) 90 tablet 3     lactulose (CHRONULAC) 10 GM/15ML solution Take 30 mLs (20 g) by mouth daily as needed for constipation 946 mL 11     order for DME Equipment being ordered: Glucerna daily shakes with each meal 1 Box 11     prochlorperazine (COMPAZINE) 10 MG tablet Take 1 tablet (10 mg) by mouth every 8 hours as needed 20 tablet 0     thin (NO BRAND SPECIFIED) lancets Use to test blood sugar 3 times daily or as directed. 1 Box 10     ORDER FOR DME Equipment being ordered: Nebulizer and supplies 1 Units 0     ORDER FOR DME Equipment being ordered: Power Wheelchair 1 Device 0     ORDER FOR DME Equipment being ordered: Depends briefs 1 Month 11     EASY TOUCH LANCETS 30G/TWIST MISC        Cholecalciferol (VITAMIN D) 2000 UNITS tablet Take 2,000 Units by mouth daily. 100 tablet 3     Multiple Vitamin (MULTIVITAMIN OR) Take 1 tablet by mouth daily        Allergies   Allergen Reactions     Bee Venom      Penicillins Anaphylaxis     Other reaction(s): Skin Rash and/or Hives     Dilantin [Phenytoin] Other (See Comments)     Generic dilantin only per pt     Iodide Hives     09/11/15: Pt states she can use iodine  and doesn't have any problems      Iodine-131      Novocaine [Procaine] Hives     Other reaction(s): Skin Rash and/or Hives     Recent Labs   Lab Test  12/28/16   0716  12/28/16   0031  12/27/16   1456  12/27/16   0624   11/04/16   1145   09/24/16   0320  09/23/16   1353   08/05/16   0812   06/06/16   0648   09/15/15   0749   08/11/15   0948   03/06/15   0756   07/22/14   1209   A1C   --    --    --    --    --    --    --    --    --    --    --    --   4.9   --    --    --   4.8   --   4.7   < >   --    LDL   --    --    --    --    --    --    --    --    --    --    --    --   77   --   71   --    --    --    --    --   104   HDL   --    --    --    --    --    --    --    --    --    --    --    --   65   --   49*   --    --    --    --    --   50*   TRIG   --    --    --    --    --    --    --    --    --    --    --    --   62   --   62   --    --    --    --    --   124   ALT   --    --    --    --    --    --    --   28  32   --   34   < >   --    < >  58*   --    --    --    --    < >  51*   CR  0.55   --    --   0.42*   < >  0.60   < >  0.57  0.55   < >  0.54   < >   --    < >  0.60   < >   --    < >  0.58   < >  0.67   GFRESTIMATED  >90  Non  GFR Calc     --    --   >90  Non  GFR Calc     < >  >90  Non  GFR Calc     < >  >90  Non  GFR Calc    >90  Non  GFR Calc     < >  >90  Non  GFR Calc     < >   --    < >  >90  Non  GFR Calc     < >   --    < >  >90  Non  GFR Calc     < >  88   GFRESTBLACK  >90   GFR Calc     --    --   >90   GFR Calc     < >  >90   GFR Calc     < >  >90   GFR Calc    >90   GFR Calc     < >  >90   GFR Calc     < >   --    < >  >90   GFR Calc     < >   --    < >  >90   GFR Calc     < >  >90   POTASSIUM   --   4.3  3.1*   --    < >   4.1   < >  4.0  4.1   < >  3.7   < >   --    < >  3.8   < >   --    < >  3.5   < >  3.6   TSH   --    --    --    --    --   0.84   --    --    --    --    --    --    --    --    --    --    --    --    --    --   1.68    < > = values in this interval not displayed.      BP Readings from Last 3 Encounters:   01/18/17 126/81   01/16/17 98/64   01/09/17 124/80    Wt Readings from Last 3 Encounters:   01/09/17 135 lb (61.236 kg)   01/02/17 130 lb (58.968 kg)   12/28/16 131 lb 13.4 oz (59.8 kg)                    ROS:  C: NEGATIVE for fever, chills, change in weight  E/M: NEGATIVE for ear, mouth and throat problems  R: NEGATIVE for significant cough or SOB  CV: NEGATIVE for chest pain, palpitations or peripheral edema  GI: NEGATIVE for nausea, abdominal pain, heartburn, or change in bowel habits  : NEGATIVE for frequency, dysuria, or hematuria  M: NEGATIVE for significant arthralgias or myalgia  H: NEGATIVE for bleeding problems  P: NEGATIVE for changes in mood or affect    OBJECTIVE:                                                    /66 mmHg  Pulse 81  Temp(Src) 98.3  F (36.8  C) (Oral)  Wt 135 lb (61.236 kg)  SpO2 96%  Body mass index is 21.14 kg/(m^2).  GENERAL:  alert and no distress  EYES: Eyes grossly normal to inspection, extraocular movements - intact, and PERRL  HENT: ear canals- normal; TMs- normal; Nose- normal; Mouth- no ulcers, no lesions  NECK: no tenderness, no adenopathy, no asymmetry, no masses, no stiffness; thyroid- normal to palpation  RESP: lungs clear to auscultation - no rales, no rhonchi, no wheezes  CV: regular rates and rhythm, normal S1 S2, no S3 or S4 and no murmur, no click or rub -  PSYCH: Alert and oriented times 3; speech- coherent , normal rate and volume; able to articulate logical thoughts, able to abstract reason, no tangential thoughts, no hallucinations or delusions, affect- normal  In wheelchair, s/p bilateral AKA's  Mild soft tissue prominence R>L lateral hip, no  skin breakdown.  No obvious trochanteric tenderness     ASSESSMENT/PLAN:                                                      (E11.51) Type 2 diabetes mellitus with diabetic peripheral angiopathy without gangrene, without long-term current use of insulin (H)  (primary encounter diagnosis)  Comment: labs as ordered  Plan: Hemoglobin A1c, Comprehensive metabolic panel            (I10) Essential hypertension with goal blood pressure less than 130/80  Comment: stable on therapy  Plan: Comprehensive metabolic panel            (D50.9) Iron deficiency anemia, unspecified iron deficiency anemia type  Comment: repeat HGB as ordered  Plan: CBC with platelets            (G89.4) Chronic pain syndrome  Comment: start patches per prior baseline  Plan: lidocaine (LIDODERM) 5 % Patch            (Z89.511) 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 (H)  Comment: pain control  Plan: lidocaine (LIDODERM) 5 % Patch            (M79.9) Mass of soft tissue  Comment: ?source, attempt image  Plan: US Extremity Non Vascular Bilateral          See Patient Instructions    Allan Casey MD  [facility]    25 minutes spent with this patient, face to face, discussing treatment options for listed problems above as well as side effects of appropriate medications.  Counseling time extended beyond 50% of the clinic visit.  Medication dosing, treatment plan and follow-up were discussed. Also reviewed need for primary care testing for patient.

## 2017-01-27 DIAGNOSIS — R11.2 INTRACTABLE VOMITING WITH NAUSEA, UNSPECIFIED VOMITING TYPE: ICD-10-CM

## 2017-01-27 DIAGNOSIS — R11.0 NAUSEA: Primary | ICD-10-CM

## 2017-01-27 DIAGNOSIS — G40.909 SEIZURE DISORDER (H): ICD-10-CM

## 2017-01-27 DIAGNOSIS — E78.5 HYPERLIPIDEMIA LDL GOAL <100: ICD-10-CM

## 2017-01-27 NOTE — TELEPHONE ENCOUNTER
Levetiracetam      Last Written Prescription Date: 09/22/16  Last Fill Quantity: 180,  # refills: 1   Last Office Visit with [facility], P or  Health prescribing provider: 01/24/17                                             Phenytek     Last Written Prescription Date: 08/05/16  Last Fill Quantity: 60, # refills: 0  Last Office Visit with [facility], [facility] or  Health prescribing provider: 01/27/17       ALT       35   1/24/2017  WBC     12.4   1/24/2017  RBC     3.56   1/24/2017  HGB     10.5   1/24/2017  HCT     33.6   1/24/2017  No components found with this name: mct  MCV       94   1/24/2017  MCH     29.5   1/24/2017  MCHC     31.3   1/24/2017  RDW     19.3   1/24/2017  PLT      320   1/24/2017  PLT      555   11/30/2016  TSH   Date Value Ref Range Status   11/04/2016 0.84 0.40 - 4.00 mU/L Final     CREATININE   Date Value Ref Range Status   01/24/2017 0.45* 0.52 - 1.04 mg/dL Final       Drug Levels  Depakote: No results found. However, due to the size of the patient record, not all encounters were searched. Please check Results Review for a complete set of results.  Dilantin:   Results for orders placed or performed during the hospital encounter of 08/02/16   Phenytoin level   Result Value Ref Range    Phenytoin Level 8.3 (L) 10 - 20 mg/L     Gabitril: No results found. However, due to the size of the patient record, not all encounters were searched. Please check Results Review for a complete set of results.  Tegretol: No results found. However, due to the size of the patient record, not all encounters were searched. Please check Results Review for a complete set of results.  Zonegran: No results found. However, due to the size of the patient record, not all encounters were searched. Please check Results Review for a complete set of results.    Tamsulosin HCL         Last Written Prescription Date: 08/02/16  Last Fill Quantity: , # refills:     Last Office Visit with [facility], P or  [employer] prescribing provider:  01/24/17    Future Office Visit:      BP Readings from Last 3 Encounters:   01/24/17 113/66   01/18/17 126/81   01/16/17 98/64     Pantoprazole      Last Written Prescription Date: 12/28/16  Last Fill Quantity: 60,  # refills: 0   Last Office Visit with [facility], [facility] or [facility] prescribing provider: 01/24/17                                             Atorvastatin    Last Written Prescription Date: 11/04/15  Last Fill Quantity: 90, # refills: 3  Last Office Visit with [facility], [facility] or [facility] prescribing provider: 01/24/17     CHOL      155   6/6/2016  HDL       65   6/6/2016  LDL       77   6/6/2016  TRIG       62   6/6/2016  CHOLHDLRATIO      2.7   9/15/2015

## 2017-01-30 DIAGNOSIS — J41.0 SIMPLE CHRONIC BRONCHITIS (H): Primary | ICD-10-CM

## 2017-01-30 RX ORDER — PANTOPRAZOLE SODIUM 40 MG/1
40 TABLET, DELAYED RELEASE ORAL 2 TIMES DAILY
Qty: 60 TABLET | Refills: 5 | Status: SHIPPED | OUTPATIENT
Start: 2017-01-30 | End: 2017-03-31

## 2017-01-30 NOTE — TELEPHONE ENCOUNTER
Pantoprazole filled.    Routing refill request to provider for review/approval because:  Patient not taking as prescribed.     Tamsulosin listed as historical/reported.

## 2017-02-06 ENCOUNTER — OFFICE VISIT (OUTPATIENT)
Dept: INTERNAL MEDICINE | Facility: CLINIC | Age: 67
End: 2017-02-06
Payer: COMMERCIAL

## 2017-02-06 ENCOUNTER — CARE COORDINATION (OUTPATIENT)
Dept: PULMONOLOGY | Facility: CLINIC | Age: 67
End: 2017-02-06

## 2017-02-06 VITALS
SYSTOLIC BLOOD PRESSURE: 138 MMHG | WEIGHT: 135 LBS | DIASTOLIC BLOOD PRESSURE: 78 MMHG | OXYGEN SATURATION: 98 % | HEART RATE: 81 BPM | TEMPERATURE: 98.7 F | BODY MASS INDEX: 21.14 KG/M2

## 2017-02-06 DIAGNOSIS — R35.0 URINARY FREQUENCY: Primary | ICD-10-CM

## 2017-02-06 DIAGNOSIS — E11.51 TYPE 2 DIABETES MELLITUS WITH DIABETIC PERIPHERAL ANGIOPATHY WITHOUT GANGRENE, WITHOUT LONG-TERM CURRENT USE OF INSULIN (H): ICD-10-CM

## 2017-02-06 PROCEDURE — 99214 OFFICE O/P EST MOD 30 MIN: CPT | Performed by: INTERNAL MEDICINE

## 2017-02-06 RX ORDER — CIPROFLOXACIN 250 MG/1
250 TABLET, FILM COATED ORAL 2 TIMES DAILY
Qty: 10 TABLET | Refills: 0 | Status: SHIPPED | OUTPATIENT
Start: 2017-02-06 | End: 2017-03-15

## 2017-02-06 NOTE — PROGRESS NOTES
Order for nebulizer and tubing supplies was signed by provider and faxed to [facility] along with most recent chart notes from previous appointment with Dr. Jennings, per request of [facility].

## 2017-02-06 NOTE — NURSING NOTE
"Chief Complaint   Patient presents with     UTI       Initial /78 mmHg  Pulse 81  Temp(Src) 98.7  F (37.1  C) (Oral)  Wt 135 lb (61.236 kg)  SpO2 98% Estimated body mass index is 21.14 kg/(m^2) as calculated from the following:    Height as of 1/9/17: 5' 7\" (1.702 m).    Weight as of this encounter: 135 lb (61.236 kg).  Medication Reconciliation: complete   Daniela Hancock, ALEKSEY      "

## 2017-02-06 NOTE — PROGRESS NOTES
SUBJECTIVE:                                                    Sonya Foote is a 67 year old female who presents to clinic today for the following health issues:      URINARY TRACT SYMPTOMS      Duration: 1 week    Description  dysuria, frequency, hesitancy, incontinence and back pain    Intensity:  moderate    Accompanying signs and symptoms:  Fever/chills: no   Flank pain no   Nausea and vomiting: no   Vaginal symptoms: none  Abdominal/Pelvic Pain: no     History  History of frequent UTI's: no   History of kidney stones: no   Sexually Active: no   Possibility of pregnancy: No    Precipitating or alleviating factors: None    Therapies tried and outcome: cranberry juice and increase fluid intake   Outcome: No change      Other concerns:  1. Discuss hgb recheck reviewed as well as A1c    A1C      4.1   1/24/2017  A1C      4.9   6/6/2016  A1C      4.8   8/11/2015  A1C      4.7   3/6/2015  A1C      4.8   3/2/2015      Problem list and histories reviewed & adjusted, as indicated.  Additional history: as documented    Patient Active Problem List   Diagnosis     Hyperlipidemia LDL goal <100     Seizure disorder (H)     ACP (advance care planning)     Osteoporosis     Schizoaffective disorder (H)     AS (sickle cell trait) (H)     Vertigo     Person who has had sex change operation     Claudication in peripheral vascular disease (H)     Intestinal malabsorption     GIB (gastrointestinal bleeding)     Cervicalgia     Health Care Home     Asthma     Adjustment disorder with depressed mood     Chronic pain syndrome     Open-angle glaucoma     History of colonic polyps     Old myocardial infarction     Iron deficiency anemia     Late effects of cerebrovascular disease     Degeneration of intervertebral disc of lumbosacral region     Thoracic or lumbosacral neuritis or radiculitis     Cerebral infarction due to occlusion or stenosis of carotid artery     Disorder of bone and cartilage     Hereditary and idiopathic peripheral  neuropathy     Androgen insensitivity syndrome     PAD (peripheral artery disease) (H)     Restless legs syndrome (RLS)     Chronic systolic congestive heart failure (H)     Carotid stenosis, left     Primary osteoarthritis of both hands     Pain in both upper extremities     Atherosclerotic peripheral vascular disease with rest pain (H)     Essential hypertension with goal blood pressure less than 130/80     Cellulitis of right ankle     Angina pectoris, crescendo (H)     Type 2 diabetes mellitus with diabetic peripheral angiopathy without gangrene, without long-term current use of insulin (H)     Anemia, unspecified type     Critical lower limb ischemia     Testicular feminization     Anxiety disorder due to general medical condition     Anxiety disorder     Central retinal artery occlusion     Lumbosacral radiculitis     Peripheral nerve disease (H)     Osteopenia     Prediabetes     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 (H)     Primary open angle glaucoma of both eyes, severe stage     Pseudophakia of right eye     Cataract, left eye     Diabetes mellitus type 2 without retinopathy (H)     Pyelonephritis     Gastroesophageal reflux disease without esophagitis     Chronic obstructive pulmonary disease, unspecified COPD type (H)     Past Surgical History   Procedure Laterality Date     Appendectomy       Tonsillectomy       Cholecystectomy       Orthopedic surgery       broken wrist repair     Breast surgery       right breast bx (benign)     Sinus surgery       cyst removed     Esophagoscopy, gastroscopy, duodenoscopy (egd), combined  12/14/2012     Procedure: COMBINED ESOPHAGOSCOPY, GASTROSCOPY, DUODENOSCOPY (EGD), BIOPSY SINGLE OR MULTIPLE;  ESOPHAGOSCOPY, GASTROSCOPY, DUODENOSCOPY (EGD), DILATATION ;  Surgeon: Elizabeth Stevenson MD;  Location:  GI     Vascular surgery       Left carotid stent     Esophagoscopy, gastroscopy, duodenoscopy (egd), combined  12/31/2013     Procedure:  COMBINED ESOPHAGOSCOPY, GASTROSCOPY, DUODENOSCOPY (EGD), BIOPSY SINGLE OR MULTIPLE;;  Surgeon: Clemente Lopez MD;  Location:  GI     Sex transformation surgery, male to female       1974     Esophagoscopy, gastroscopy, duodenoscopy (egd), combined  4/1/2014     Procedure: COMBINED ESOPHAGOSCOPY, GASTROSCOPY, DUODENOSCOPY (EGD);;  Surgeon: Clemente Lopez MD;  Location:  GI     Endarterectomy femoral  5/23/2014     Procedure: ENDARTERECTOMY FEMORAL;  Surgeon: Jason Joshi MD;  [facility]     Esophagoscopy, gastroscopy, duodenoscopy (egd), combined  6/28/2014     Procedure: COMBINED ESOPHAGOSCOPY, GASTROSCOPY, DUODENOSCOPY (EGD);  Surgeon: Clemente Lopez MD;  [facility]     Colonoscopy with co2 insufflation N/A 8/20/2014     Procedure: COLONOSCOPY WITH CO2 INSUFFLATION;  Surgeon: Duane, William Charles, MD;  Location: [facility]     Esophagoscopy, gastroscopy, duodenoscopy (egd), combined N/A 8/20/2014     Procedure: COMBINED ESOPHAGOSCOPY, GASTROSCOPY, DUODENOSCOPY (EGD), BIOPSY SINGLE OR MULTIPLE;  Surgeon: Duane, William Charles, MD;  Location: [facility]     Esophagoscopy, gastroscopy, duodenoscopy (egd), combined N/A 8/22/2014     Procedure: COMBINED ESOPHAGOSCOPY, GASTROSCOPY, DUODENOSCOPY (EGD), BIOPSY SINGLE OR MULTIPLE;  Surgeon: Mello Ferrer MD;  [facility]     Hc capsule endoscopy N/A 8/25/2014     Procedure: CAPSULE/PILL CAM ENDOSCOPY;  Surgeon: Remy Haskins MD;  [facility]     Colonoscopy N/A 8/25/2014     Procedure: COLONOSCOPY;  Surgeon: Mello Ferrer MD;  [facility]     Hc capsule endoscopy N/A 10/2/2014     Procedure: CAPSULE/PILL CAM ENDOSCOPY;  Surgeon: Remy Haskins MD;  Location:  GI     Esophagoscopy, gastroscopy, duodenoscopy (egd), combined N/A 10/2/2014     Procedure: COMBINED ESOPHAGOSCOPY, GASTROSCOPY, DUODENOSCOPY (EGD), BIOPSY SINGLE OR MULTIPLE;  Surgeon: Remy Haskins MD;  [facility]     Angiogram Bilateral 11/21/2014      Procedure: ANGIOGRAM;  Surgeon: Savannah Durant MD;  [facility]     Esophagoscopy, gastroscopy, duodenoscopy (egd), combined Left 12/15/2014     Procedure: COMBINED ESOPHAGOSCOPY, GASTROSCOPY, DUODENOSCOPY (EGD), BIOPSY SINGLE OR MULTIPLE;  Surgeon: Remy Haskins MD;  [facility]     Angiogram Left 1/16/2015     Procedure: ANGIOGRAM;  Surgeon: Savannah Durant MD;  [facility]     Esophagoscopy, gastroscopy, duodenoscopy (egd), combined N/A 2/25/2015     Procedure: COMBINED ENDOSCOPIC ULTRASOUND, ESOPHAGOSCOPY, GASTROSCOPY, DUODENOSCOPY (EGD), FINE NEEDLE ASPIRATE/BIOPSY;  Surgeon: Clemente Lugo MD;  [facility]     Esophagoscopy, gastroscopy, duodenoscopy (egd), combined Left 2/25/2015     Procedure: COMBINED ESOPHAGOSCOPY, GASTROSCOPY, DUODENOSCOPY (EGD), BIOPSY SINGLE OR MULTIPLE;  Surgeon: Clemente Lugo MD;  [facility]     Angiogram Bilateral 9/14/2015     Procedure: ANGIOGRAM;  Surgeon: Savannah Durant MD;  [facility]     Angiogram Left 10/12/2015     Procedure: ANGIOGRAM;  Surgeon: Savannah Durant MD;  [facility]     Amputate toe(s) Right 1/5/2016     Procedure: AMPUTATE TOE(S);  Surgeon: Mello Gaines DPM;  Location:  [facility]     Amputate leg above knee Left 6/11/2016     Procedure: AMPUTATE LEG ABOVE KNEE;  Surgeon: Mello Rodriguez MD;  [facility]     Fasciotomy lower extremity Left 6/10/2016     Procedure: FASCIOTOMY LOWER EXTREMITY;  Surgeon: Mello Rodriguez MD;  [facility]     Angiogram Right 6/6/2016     Procedure: ANGIOGRAM;  Surgeon: Savannah Durant MD;  [facility]     Angioplasty Right 6/6/2016     Procedure: ANGIOPLASTY;  Surgeon: Savannah Durant MD;  [facility]     Esophagoscopy, gastroscopy, duodenoscopy (egd), combined N/A 9/25/2016     Procedure: COMBINED ESOPHAGOSCOPY, GASTROSCOPY, DUODENOSCOPY (EGD);  Surgeon: Aziza Patiño MD;  Location:  GI     Amputate leg below knee Right 11/7/2016     Procedure: AMPUTATE LEG  BELOW KNEE;  Surgeon: Savannah Durant MD;  [facility]     Amputate revision stump lower extremity Right 11/11/2016     Procedure: AMPUTATE REVISION STUMP LOWER EXTREMITY;  Surgeon: Savannah Durant MD;  [facility]     Amputate revision stump lower extremity Right 11/16/2016     Procedure: AMPUTATE REVISION STUMP LOWER EXTREMITY;  Surgeon: Savannah Durant MD;  [facility]     Esophagoscopy, gastroscopy, duodenoscopy (egd), combined N/A 1/18/2017     Procedure: COMBINED ESOPHAGOSCOPY, GASTROSCOPY, DUODENOSCOPY (EGD), BIOPSY SINGLE OR MULTIPLE;  Surgeon: Clemente Lopez MD;  [facility]       Social History   Substance Use Topics     Smoking status: Former Smoker -- 2.50 packs/day for 30 years     Types: Cigarettes, Cigars     Quit date: 11/01/2000     Smokeless tobacco: Never Used     Alcohol Use: No     Family History   Problem Relation Age of Onset     CANCER Maternal Aunt      leukemia     Schizophrenia Brother      Depression Brother      Suicide Sister      Depression Sister      DIABETES Sister      Dementia Mother      Glaucoma Mother      DIABETES Mother      Coronary Artery Disease Mother      MI     CANCER Maternal Aunt      ovarian     Glaucoma Father      DIABETES Father      Heart Failure Father      Glaucoma Maternal Grandmother      DIABETES Maternal Grandmother      Glaucoma Maternal Grandfather      DIABETES Maternal Grandfather      Glaucoma Paternal Grandmother      DIABETES Paternal Grandmother      Glaucoma Paternal Grandfather      DIABETES Paternal Grandfather      Breast Cancer Sister      CEREBROVASCULAR DISEASE Brother          Current Outpatient Prescriptions   Medication Sig Dispense Refill     ciprofloxacin (CIPRO) 250 MG tablet Take 1 tablet (250 mg) by mouth 2 times daily 10 tablet 0     pantoprazole (PROTONIX) 40 MG EC tablet Take 1 tablet (40 mg) by mouth 2 times daily Then QD 60 tablet 5     order for DME Equipment being ordered: Nebulizer and tubing supplies 1 Units 0      lidocaine (LIDODERM) 5 % Patch Apply from 1 to 3 patches to painful area at once for up to 12 h within a 24 h period.  Remove after 12 hours. 30 patch 1     oxyCODONE-acetaminophen (PERCOCET) 5-325 MG per tablet Take 1-2 tablets every 3 hours as needed for pain. 60 tablet 0     albuterol (2.5 MG/3ML) 0.083% neb solution INHALE 1 VIAL VIA NEBULIZER EVERY 6 HOURS AS NEEDED 360 mL 11     sucralfate (CARAFATE) 1 GM/10ML suspension Take 10 mLs (1 g) by mouth 4 times daily (before meals and nightly) 1200 mL 2     order for DME [facility]cu check BID 1 Device 0     ondansetron (ZOFRAN-ODT) 4 MG ODT tab Take 1 tablet (4 mg) by mouth every 6 hours 120 tablet 0     metoprolol (TOPROL-XL) 25 MG 24 hr tablet Take 1 tablet (25 mg) by mouth daily 30 tablet 0     metoclopramide (REGLAN) 5 MG tablet Take 1 tablet (5 mg) by mouth 3 times daily (before meals) 240 tablet 0     famotidine (PEPCID) 20 MG tablet Take 1 tablet (20 mg) by mouth 2 times daily 60 tablet 0     CYANOCOBALAMIN PO Take 2,000 mcg by mouth daily       gabapentin (NEURONTIN) 300 MG capsule Take 1 capsule (300 mg) by mouth 3 times daily 90 capsule 3     Nutritional Supplements (RENÉE) PACK Take 1 packet by mouth 2 times daily       polyethylene glycol (MIRALAX/GLYCOLAX) powder Take 17 g by mouth daily       HYDROmorphone (DILAUDID) 2 MG tablet Take 1 tablet (2 mg) by mouth every 3 hours as needed for moderate to severe pain 30 tablet 0     fluticasone (FLONASE) 50 MCG/ACT nasal spray Spray 1 spray into both nostrils daily       ADVAIR DISKUS 250-50 MCG/DOSE diskus inhaler Inhale 2 puffs into the lungs daily       calcium citrate-vitamin D (CITRACAL) 315-250 MG-UNIT TABS Take 2 tablets by mouth daily 120 tablet 5     pregabalin (LYRICA) 75 MG capsule Take 1 capsule (75 mg) by mouth 2 times daily (takes at 8 AM and 8 PM) 90 capsule 3     ferrous sulfate (IRON) 325 (65 FE) MG tablet Take 1 tablet (325 mg) by mouth 2 times daily With meals 60 tablet 2     hydrochlorothiazide  (MICROZIDE) 12.5 MG capsule Take 1 capsule (12.5 mg) by mouth daily 90 capsule 3     lisinopril (PRINIVIL,ZESTRIL) 2.5 MG tablet Take 1 tablet (2.5 mg) by mouth daily 90 tablet 3     escitalopram (LEXAPRO) 20 MG tablet One per day 90 tablet 3     estradiol (ESTRACE) 1 MG tablet Take 1 tablet (1 mg) by mouth daily 90 tablet 3     levETIRAcetam (KEPPRA) 500 MG tablet Take 1 tablet (500 mg) by mouth 2 times daily (Patient taking differently: Take 500 mg by mouth 3 times daily ) 180 tablet 1     traZODone (DESYREL) 100 MG tablet Take 1 tablet (100 mg) by mouth At Bedtime 90 tablet 3     rOPINIRole (REQUIP) 0.25 MG tablet Take 3 tablets (0.75 mg) by mouth At Bedtime 270 tablet 1     aspirin EC 81 MG EC tablet Take 1 tablet (81 mg) by mouth daily 90 tablet 3     clopidogrel (PLAVIX) 75 MG tablet Take 1 tablet (75 mg) by mouth daily (Patient taking differently: Take 75 mg by mouth At Bedtime ) 90 tablet 3     risperiDONE (RISPERDAL) 1 MG tablet Take 0.5 tablets (0.5 mg) by mouth At Bedtime 90 tablet 0     blood glucose monitoring (ONE TOUCH ULTRA 2) meter device kit Use to test blood sugars 3 times daily or as directed. 1 kit 0     blood glucose monitoring (ONE TOUCH ULTRA) test strip Use to test blood sugars 3 times daily or as directed. 3 Box 3     blood glucose monitoring (ONE TOUCH ULTRASOFT) lancets Use to test blood sugar 3 times daily or as directed. 3 Box 3     phenytoin 200 MG CAPS Take 200 mg by mouth 2 times daily (Patient taking differently: Take 200 mg by mouth 2 times daily (takes at 8 AM and 8 PM)) 60 capsule 0     DOCUSATE SODIUM PO Take 100 mg by mouth daily       tamsulosin (FLOMAX) 0.4 MG 24 hr capsule Take 0.4 mg by mouth At Bedtime       dorzolamide (TRUSOPT) 2 % ophthalmic solution Place 1 drop into both eyes 2 times daily        diazepam (VALIUM) 5 MG tablet Take 1 tablet (5 mg) by mouth every 6 hours as needed for anxiety 20 tablet 1     SPIRIVA HANDIHALER 18 MCG inhalation capsule INHALE THE  CONTENTS OF 1 CAPSULE VIA INHALATION DEVICE DAILY 30 capsule 2     zinc 50 MG TABS Take 1 tablet by mouth daily       cetirizine (ZYRTEC) 10 MG tablet Take 1 tablet (10 mg) by mouth every evening 90 tablet 3     nitroglycerin (NITROSTAT) 0.4 MG SL tablet Place 1 tablet (0.4 mg) under the tongue every 5 minutes as needed for chest pain if you are still having symptoms after 3 doses (15 minutes) call 911. 25 tablet 1     MEDICATION GIVEN BY INTRATHECAL PUMP - INSTRUCTION continuous 4/11/2016 per [facility] in [place] [phone]:  Conc: Bupivacaine 20 mg/mL and Fentanyl 2000 mcg/mL.  Continuous: Bupivacaine 5.052 mg/day and Fentanyl 505.2 mcg/day.  Boluses: Up to 7 boluses per 24-hr period of Bupivicaine 0.5992 mg and Fentanyl 59.9 mcg  Max daily dose: Bupivacaine 9.1973 mg/day and Fentanyl 919.5883 mcg/day    Pump Last Fill Date:  6/30/2016  Pump Refill Date: 9/20/2016       latanoprost (XALATAN) 0.005 % ophthalmic solution Place 1 drop into both eyes At Bedtime 2.5 mL 11     atorvastatin (LIPITOR) 40 MG tablet Take 1 tablet (40 mg) by mouth daily (Patient taking differently: Take 40 mg by mouth At Bedtime ) 90 tablet 3     lactulose (CHRONULAC) 10 GM/15ML solution Take 30 mLs (20 g) by mouth daily as needed for constipation 946 mL 11     order for DME Equipment being ordered: Glucerna daily shakes with each meal 1 Box 11     prochlorperazine (COMPAZINE) 10 MG tablet Take 1 tablet (10 mg) by mouth every 8 hours as needed 20 tablet 0     thin (NO BRAND SPECIFIED) lancets Use to test blood sugar 3 times daily or as directed. 1 Box 10     ORDER FOR DME Equipment being ordered: Nebulizer and supplies 1 Units 0     ORDER FOR DME Equipment being ordered: Power Wheelchair 1 Device 0     ORDER FOR DME Equipment being ordered: Depends briefs 1 Month 11     EASY TOUCH LANCETS 30G/TWIST MISC        Cholecalciferol (VITAMIN D) 2000 UNITS tablet Take 2,000 Units by mouth daily. 100 tablet 3      Multiple Vitamin (MULTIVITAMIN OR) Take 1 tablet by mouth daily        Allergies   Allergen Reactions     Bee Venom      Penicillins Anaphylaxis     Other reaction(s): Skin Rash and/or Hives     Dilantin [Phenytoin] Other (See Comments)     Generic dilantin only per pt     Iodide Hives     09/11/15: Pt states she can use iodine and doesn't have any problems      Iodine-131      Novocaine [Procaine] Hives     Other reaction(s): Skin Rash and/or Hives     Recent Labs   Lab Test  01/24/17   1156  12/28/16   0716  12/28/16   0031   11/04/16   1145   09/24/16   0320  09/23/16   1353   06/06/16   0648   09/15/15   0749   08/11/15   0948   07/22/14   1209   A1C  4.1*   --    --    --    --    --    --    --    --   4.9   --    --    --   4.8   < >   --    LDL   --    --    --    --    --    --    --    --    --   77   --   71   --    --    --   104   HDL   --    --    --    --    --    --    --    --    --   65   --   49*   --    --    --   50*   TRIG   --    --    --    --    --    --    --    --    --   62   --   62   --    --    --   124   ALT  35   --    --    --    --    --   28  32   < >   --    < >  58*   --    --    < >  51*   CR  0.45*  0.55   --    < >  0.60   < >  0.57  0.55   < >   --    < >  0.60   < >   --    < >  0.67   GFRESTIMATED  >90  Non  GFR Calc    >90  Non  GFR Calc     --    < >  >90  Non  GFR Calc     < >  >90  Non  GFR Calc    >90  Non  GFR Calc     < >   --    < >  >90  Non  GFR Calc     < >   --    < >  88   GFRESTBLACK  >90   GFR Calc    >90   GFR Calc     --    < >  >90   GFR Calc     < >  >90   GFR Calc    >90   GFR Calc     < >   --    < >  >90   GFR Calc     < >   --    < >  >90   POTASSIUM  3.9   --   4.3   < >  4.1   < >  4.0  4.1   < >   --    < >  3.8   < >   --    < >  3.6   TSH   --    --    --     --   0.84   --    --    --    --    --    --    --    --    --    --   1.68    < > = values in this interval not displayed.      BP Readings from Last 3 Encounters:   02/06/17 138/78   01/24/17 113/66   01/18/17 126/81    Wt Readings from Last 3 Encounters:   02/06/17 135 lb (61.236 kg)   01/24/17 135 lb (61.236 kg)   01/09/17 135 lb (61.236 kg)            Labs reviewed in EPIC    ROS:  C: NEGATIVE for fever, chills, change in weight  E/M: NEGATIVE for ear, mouth and throat problems  R: NEGATIVE for significant cough or SOB  CV: NEGATIVE for chest pain, palpitations or peripheral edema  GI: NEGATIVE for nausea, abdominal pain, heartburn, or change in bowel habits  M: NEGATIVE for significant arthralgias or myalgia  P: NEGATIVE for changes in mood or affect    OBJECTIVE:                                                    /78 mmHg  Pulse 81  Temp(Src) 98.7  F (37.1  C) (Oral)  Wt 135 lb (61.236 kg)  SpO2 98%  Body mass index is 21.14 kg/(m^2).  GENERAL:  alert and no distress in wheelchair  EYES: Eyes grossly normal to inspection, extraocular movements - intact, and PERRL  HENT: ear canals- normal; TMs- normal; Nose- normal; Mouth- no ulcers, no lesions  NECK: no tenderness, no adenopathy, no asymmetry, no masses, no stiffness; thyroid- normal to palpation  RESP: lungs clear to auscultation - no rales, no rhonchi, no wheezes  CV: regular rates and rhythm, normal S1 S2, no S3 or S4 and no murmur, no click or rub -  MS: bilateral BKA's  PSYCH: Alert and oriented times 3; speech- coherent , normal rate and volume; able to articulate logical thoughts, able to abstract reason, no tangential thoughts, no hallucinations or delusions, affect- normal       ASSESSMENT/PLAN:                                                    1. Urinary frequency  Unable to leave UA, will treat based on symptoms  - ciprofloxacin (CIPRO) 250 MG tablet; Take 1 tablet (250 mg) by mouth 2 times daily  Dispense: 10 tablet; Refill: 0    2.  Type 2 diabetes mellitus with diabetic peripheral angiopathy without gangrene, without long-term current use of insulin (H)  Quite low A1c, suggested trial of stopping Metformin as concern for hypoglycemia      See Patient Instructions    Allan Casey MD  [facility]    25 minutes spent with this patient, face to face, discussing treatment options for listed problems above as well as side effects of appropriate medications.  Counseling time extended beyond 50% of the clinic visit.  Medication dosing, treatment plan and follow-up were discussed. Also reviewed need for primary care testing for patient.     '

## 2017-02-07 ENCOUNTER — CARE COORDINATION (OUTPATIENT)
Dept: GERIATRIC MEDICINE | Facility: CLINIC | Age: 67
End: 2017-02-07

## 2017-02-07 ENCOUNTER — MEDICAL CORRESPONDENCE (OUTPATIENT)
Dept: HEALTH INFORMATION MANAGEMENT | Facility: CLINIC | Age: 67
End: 2017-02-07

## 2017-02-07 DIAGNOSIS — G40.909 SEIZURE DISORDER (H): Primary | ICD-10-CM

## 2017-02-07 NOTE — PROGRESS NOTES
2/7/17: CM placed call to member to f/u on CPAP cord - member states she has not received.  CM placed call to [facility] - it was shipped out on Friday and will be delivered by [employer].  CM relayed this to member.    CM also confirmed with member regarding [facility] worker visit tomorrow. Member has on calendar - states she has not received phone call to confirm yet.     Jamie Sharma RN, PHN  [facility]

## 2017-02-08 ENCOUNTER — CARE COORDINATION (OUTPATIENT)
Dept: GERIATRIC MEDICINE | Facility: CLINIC | Age: 67
End: 2017-02-08

## 2017-02-08 ENCOUNTER — TELEPHONE (OUTPATIENT)
Dept: INTERNAL MEDICINE | Facility: CLINIC | Age: 67
End: 2017-02-08

## 2017-02-08 DIAGNOSIS — J44.9 COPD (CHRONIC OBSTRUCTIVE PULMONARY DISEASE) (H): Primary | ICD-10-CM

## 2017-02-08 NOTE — PROGRESS NOTES
2/8/17: CM received call from member stating that she received her CPAP power cord however there is no power supply unit pack.  She states she didn't realize when she ordered that she needed that as well.  She stated that she called [facility] and was told it needs to be ordered separately - cost is $96.   CM placed call to [facility] - spoke with Matt - she confirmed cost of S9 90w power supply unit is $96 under [facility].  CM will update CPS and send to CMS for authorization.       CM placed call back to member to inform her.  Member states that she is in need of new mask and supplies as well.   She states that she needed new nebulizer mask and supplies and ordered those from another vendor (She is unsure of which vendor).    CM placed call back [facility] - per Cristopher last rx for CPAP supplies is from 2014 therefore new rx is needed.  She can receive: mask - 3/year; hoses q 3 mos; filters 2/month; cushion 1/mo.    Rx can be faxed to [facility] at [phone].    CM relayed this to member - she states that she does not have a current sleep physician - she states that when she was in the hospital in Dec she was told they would make referral to sleep center at [facility] but she never received a phone call.  CM reviewed in Epic - referral was made on 12/28/16.  Provided member with phone # for [place] [facility] sleep center - member will call to make appt.   In the meantime, Cm will write PCP to see if PCP will write rx for CPAP supplies until she can make appt with sleep center.       Member states she had [facility] worker visit today and things went well.     Jamie Sharma RN, PHN  [facility]

## 2017-02-08 NOTE — PROGRESS NOTES
Dr. Casey -        I am Sonya Foote's  with [facility].   She is in need of new CPAP supplies (mask, hose, filter, cushions) from [employer].  They are in need of a new Rx for these supplies before they will deliver.  She stated that when she was in the hospital in Dec she was told a referral would be made to sleep center but she hasn't received a call yet - I saw the referral in [facility] and provided her with the contact # to schedule an appt.   In the meantime as she is waiting to schedule an appt she is in need of CPAP supplies.  Would you be wiling to write Rx for these?    If so, please fax to [employer] at [phone].    Thank you,   Jamie Sharma RN, PHN  [facility]

## 2017-02-09 NOTE — PROGRESS NOTES
2/9/17: CM reviewed member's case with Luiasna Orlando at [facility] due to high cost claims review - reviewed member's care plan as well services and care and hospitalization.   She will f/u with CM in 30 days to confirm member is doing well.  Luisana also states that she will f/u on member's pharmacy claims as she has not received email back from Pharmacy.     Jamie Sharma RN, PHN  [facility]

## 2017-02-09 NOTE — TELEPHONE ENCOUNTER
-PRIOR AUTHORIZATION REQUIRED-  This medication requires a Prior Authorization in order for the Insurance to cover.  Please contact the insurance to start the PA for this medication.  Please inform the pharmacy if the PA gets approved or denied.  Lidocaine 5% Patch  ID:[id number]  Insurance Company:[employer]  Phone #:[phone]      Tierney Guzman, Pharmacy Technician  [facility]  [phone]

## 2017-02-10 ENCOUNTER — TELEPHONE (OUTPATIENT)
Dept: INTERNAL MEDICINE | Facility: CLINIC | Age: 67
End: 2017-02-10

## 2017-02-10 NOTE — PROGRESS NOTES
I'm just following up - can you tell me if this was faxed to [facility] [phone]  Thank you,   Jamie Sharma RN, PHN  [employer]

## 2017-02-11 DIAGNOSIS — G40.909 SEIZURE DISORDER (H): ICD-10-CM

## 2017-02-11 LAB — PHENYTOIN SERPL-MCNC: 7.8 MG/L (ref 10–20)

## 2017-02-11 PROCEDURE — 36415 COLL VENOUS BLD VENIPUNCTURE: CPT | Performed by: INTERNAL MEDICINE

## 2017-02-11 PROCEDURE — 80185 ASSAY OF PHENYTOIN TOTAL: CPT | Performed by: INTERNAL MEDICINE

## 2017-02-13 NOTE — PROGRESS NOTES
I am just following up again on the CPAP supplies for this patient.  She is inquiring and [facility] states they have not received fax for any orders.     Sorry for any inconvenience.       If easier - you can fax to me and I will send to [facility] - my fax at [facility] is [phone]    Thank you,   Jamie

## 2017-02-14 ENCOUNTER — TRANSFERRED RECORDS (OUTPATIENT)
Dept: HEALTH INFORMATION MANAGEMENT | Facility: CLINIC | Age: 67
End: 2017-02-14

## 2017-02-14 ENCOUNTER — TELEPHONE (OUTPATIENT)
Dept: INTERNAL MEDICINE | Facility: CLINIC | Age: 67
End: 2017-02-14

## 2017-02-14 DIAGNOSIS — J44.9 CHRONIC OBSTRUCTIVE PULMONARY DISEASE, UNSPECIFIED COPD TYPE (H): Primary | Chronic | ICD-10-CM

## 2017-02-14 RX ORDER — TAMSULOSIN HYDROCHLORIDE 0.4 MG/1
0.4 CAPSULE ORAL AT BEDTIME
Qty: 60 CAPSULE | Refills: 2 | Status: CANCELLED | OUTPATIENT
Start: 2017-02-14

## 2017-02-14 RX ORDER — ATORVASTATIN CALCIUM 40 MG/1
40 TABLET, FILM COATED ORAL DAILY
Qty: 90 TABLET | Refills: 3 | Status: CANCELLED | OUTPATIENT
Start: 2017-02-14

## 2017-02-14 RX ORDER — LEVETIRACETAM 500 MG/1
500 TABLET ORAL 2 TIMES DAILY
Qty: 180 TABLET | Refills: 1 | Status: CANCELLED | OUTPATIENT
Start: 2017-02-14

## 2017-02-14 RX ORDER — PHENYTOIN SODIUM 200 MG/1
200 CAPSULE, EXTENDED RELEASE ORAL 2 TIMES DAILY
Qty: 60 CAPSULE | Refills: 0 | Status: CANCELLED | OUTPATIENT
Start: 2017-02-14

## 2017-02-14 NOTE — TELEPHONE ENCOUNTER
Left message on machine to call back  Daniela Hancock, [facility]

## 2017-02-14 NOTE — PROGRESS NOTES
2/14/17: See PCC contact notes for f/u on rx to [facility] for CPAP supplies.   CM received cc'd rx for CPAP supplies.  CM placed call to [facility] medical - they state they have not received fax.  CM faxed rx to [facility].  Member aware.   Per [facility] - member should have received CPAP power unit delivered today. Member confirmed she received.     Member inquired on pharmacy claims as she received letter in mail re: this.  Explained that per Luisana at [employer] they are looking into.     Jamie Sharma RN, PHN  [facility]

## 2017-02-15 NOTE — TELEPHONE ENCOUNTER
Prior authorization    Medication name lidoderm  Insurance [employer]  Insurance ID number [id number]  Prior authorization faxed through cover my meds

## 2017-02-17 NOTE — PROGRESS NOTES
2/17/17: Spoke with Nayana at [facility] stating that they need more specific dx on rx for cpap supplies as well as length (i.e. Lifetime-99, 12 mos).  CM sent pcc note to PCP to clarify and will fax to to [facility] for update.

## 2017-02-17 NOTE — PROGRESS NOTES
Dr. Sanon - I received a call from [facility]  - they state that on the Rx for the CPAP supplies they need a more specific dx as well as length of need.   I would be happy to update the rx for you if you are ok with me adding her dx of Sleep Apnea G47.30.    Also how long do you want the rx for CPAP supplies good for?   Lifetime or 12 mos?       Please let me know and I will fax to them.     Thank you,   Jamie Sharma RN, N  [facility]

## 2017-02-20 ENCOUNTER — MEDICAL CORRESPONDENCE (OUTPATIENT)
Dept: HEALTH INFORMATION MANAGEMENT | Facility: CLINIC | Age: 67
End: 2017-02-20

## 2017-02-21 DIAGNOSIS — K21.9 GASTROESOPHAGEAL REFLUX DISEASE WITHOUT ESOPHAGITIS: Primary | ICD-10-CM

## 2017-02-21 RX ORDER — SUCRALFATE 1 G/1
1 TABLET ORAL 4 TIMES DAILY
Qty: 40 TABLET | Refills: 5 | Status: SHIPPED | OUTPATIENT
Start: 2017-02-21 | End: 2017-10-24

## 2017-02-21 NOTE — TELEPHONE ENCOUNTER
Fax received from [employer] stating that carafate 1GM/10mL Oral susp is no longer covered under patients formulary- alternative is sucralfate 1 gm tablet. Please prescribe alternative or start PA process.  Daniela Hancock, CMA

## 2017-02-21 NOTE — PROGRESS NOTES
Dr. Sanon - I am f/u on rx for CPAP supplies - are you ok if I update with Sleep Apnea dx?  Also do you wan supplies for lifetime or 12 mos?   Thank you very much.     Jamie Sharma RN, N  [facility]

## 2017-02-22 ENCOUNTER — MEDICAL CORRESPONDENCE (OUTPATIENT)
Dept: HEALTH INFORMATION MANAGEMENT | Facility: CLINIC | Age: 67
End: 2017-02-22

## 2017-02-22 ENCOUNTER — OFFICE VISIT (OUTPATIENT)
Dept: SLEEP MEDICINE | Facility: CLINIC | Age: 67
End: 2017-02-22
Attending: ORTHOPAEDIC SURGERY
Payer: COMMERCIAL

## 2017-02-22 VITALS
OXYGEN SATURATION: 96 % | DIASTOLIC BLOOD PRESSURE: 62 MMHG | BODY MASS INDEX: 20.36 KG/M2 | HEART RATE: 81 BPM | SYSTOLIC BLOOD PRESSURE: 113 MMHG | WEIGHT: 130 LBS

## 2017-02-22 DIAGNOSIS — I50.22 CHRONIC SYSTOLIC CONGESTIVE HEART FAILURE (H): Primary | ICD-10-CM

## 2017-02-22 DIAGNOSIS — G89.4 CHRONIC PAIN SYNDROME: ICD-10-CM

## 2017-02-22 DIAGNOSIS — G47.33 OSA (OBSTRUCTIVE SLEEP APNEA): Primary | ICD-10-CM

## 2017-02-22 DIAGNOSIS — G47.33 OSA (OBSTRUCTIVE SLEEP APNEA): ICD-10-CM

## 2017-02-22 DIAGNOSIS — R11.2 INTRACTABLE VOMITING WITH NAUSEA, UNSPECIFIED VOMITING TYPE: ICD-10-CM

## 2017-02-22 PROCEDURE — 99215 OFFICE O/P EST HI 40 MIN: CPT | Performed by: INTERNAL MEDICINE

## 2017-02-22 RX ORDER — FAMOTIDINE 20 MG/1
20 TABLET, FILM COATED ORAL 2 TIMES DAILY
Qty: 60 TABLET | Refills: 11 | Status: SHIPPED | OUTPATIENT
Start: 2017-02-22 | End: 2017-03-31

## 2017-02-22 NOTE — MR AVS SNAPSHOT
After Visit Summary   2/22/2017    Sonya Foote    MRN: [id number]           Patient Information     Date Of Birth          1950        Visit Information        Provider Department      2/22/2017 11:00 AM Guanaco Kowalski MD [facility]        Today's Diagnoses     Chronic systolic congestive heart failure (H)    -  1    JOSE (obstructive sleep apnea)        Chronic pain syndrome          Care Instructions    We will have you come in for a sleep study to adjust your CPAP machine.  Make sure you have your assisted living send you with your pills for the evening and for the next morning.  After the study, we will see about switching you over to [facility] for the CPAP equipment.        Follow-ups after your visit        Your next 10 appointments already scheduled     Feb 26, 2017  8:00 PM CST   PSG Titration with BK BED 2   [facility] ([facility])    [address] [place] [id number]   [phone]            Mar 03, 2017 10:50 AM CST   (Arrive by 10:20 AM)   RETURN DIABETES with Michelle Irizarry MD   [facility] ([facility])    [address] [place] [id number] [phone]            Mar 15, 2017  2:30 PM CDT   Return Sleep Patient with Guanaco Kowalski MD   [facility] ([facility])    [address] [place] [id number]   [phone]            Jul 10, 2017 11:00 AM CDT   (Arrive by 10:45 AM)   Return Visit with Alina Jennings MD   [facility] ([facility])    [address] [place] [id number] [phone]              Future tests that were ordered for you today     Open Future Orders        Priority Expected Expires Ordered    Comprehensive Sleep  "Study Routine  2017            Who to contact     If you have questions or need follow up information about today's clinic visit or your schedule please contact [facility] directly at [phone].  Normal or non-critical lab and imaging results will be communicated to you by [employer], letter or phone within 4 business days after the clinic has received the results. If you do not hear from us within 7 days, please contact the clinic through [employer] or phone. If you have a critical or abnormal lab result, we will notify you by phone as soon as possible.  Submit refill requests through [employer] or call your pharmacy and they will forward the refill request to us. Please allow 3 business days for your refill to be completed.          Additional Information About Your Visit        Revhar[employer] Information     [employer] lets you send messages to your doctor, view your test results, renew your prescriptions, schedule appointments and more. To sign up, go to [email or web address] . Click on \"Log in\" on the left side of the screen, which will take you to the Welcome page. Then click on \"Sign up Now\" on the right side of the page.     You will be asked to enter the access code listed below, as well as some personal information. Please follow the directions to create your username and password.     Your access code is: [email or web address]  Expires: 3/26/2017  6:30 AM     Your access code will  in 90 days. If you need help or a new code, please call your [place] clinic or [phone].        Care EveryWhere ID     This is your Care EveryWhere ID. This could be used by other organizations to access your [place] medical records  [id number]        Your Vitals Were     Pulse Pulse Oximetry BMI (Body Mass Index)             81 96% 20.36 kg/m2          Blood Pressure from Last 3 Encounters:   17 113/62   17 138/78   17 113/66    Weight from Last 3 Encounters:   17 59 kg (130 lb) "   02/06/17 61.2 kg (135 lb)   01/24/17 61.2 kg (135 lb)              We Performed the Following     Sleep Study Referral          Today's Medication Changes          These changes are accurate as of: 2/22/17 12:13 PM.  If you have any questions, ask your nurse or doctor.               These medicines have changed or have updated prescriptions.        Dose/Directions    atorvastatin 40 MG tablet   Commonly known as:  LIPITOR   This may have changed:  when to take this   Used for:  Hyperlipidemia LDL goal <100        Dose:  40 mg   Take 1 tablet (40 mg) by mouth daily   Quantity:  90 tablet   Refills:  3       clopidogrel 75 MG tablet   Commonly known as:  PLAVIX   This may have changed:  when to take this   Used for:  PAD (peripheral artery disease) (H), UGI bleed, Osteoporosis, Nausea        Dose:  75 mg   Take 1 tablet (75 mg) by mouth daily   Quantity:  90 tablet   Refills:  3       levETIRAcetam 500 MG tablet   Commonly known as:  KEPPRA   This may have changed:  when to take this   Used for:  Nausea        Dose:  500 mg   Take 1 tablet (500 mg) by mouth 2 times daily   Quantity:  180 tablet   Refills:  1       Phenytoin Sodium Extended 200 MG Caps   This may have changed:  additional instructions   Used for:  Seizure disorder (H)        Dose:  200 mg   Take 200 mg by mouth 2 times daily   Quantity:  60 capsule   Refills:  0            Where to get your medicines      These medications were sent to [facility] [place] [address], [facility] [address]     Phone:  [phone]     famotidine 20 MG tablet                Primary Care Provider Office Phone # Fax #    Allan Casey [phone]       [facility] [address] [facility] [address]        Thank you!     Thank you for choosing [facility]  for your care. Our goal is always to provide you with excellent care. Hearing back from our  patients is one way we can continue to improve our services. Please take a few minutes to complete the written survey that you may receive in the mail after your visit with us. Thank you!             Your Updated Medication List - Protect others around you: Learn how to safely use, store and throw away your medicines at www.disposemymeds.org.          This list is accurate as of: 2/22/17 12:13 PM.  Always use your most recent med list.                   Brand Name Dispense Instructions for use    ADVAIR DISKUS 250-50 MCG/DOSE diskus inhaler   Generic drug:  fluticasone-salmeterol      Inhale 2 puffs into the lungs daily       albuterol (2.5 MG/3ML) 0.083% neb solution     360 mL    INHALE 1 VIAL VIA NEBULIZER EVERY 6 HOURS AS NEEDED       aspirin 81 MG EC tablet     90 tablet    Take 1 tablet (81 mg) by mouth daily       atorvastatin 40 MG tablet    LIPITOR    90 tablet    Take 1 tablet (40 mg) by mouth daily       blood glucose monitoring meter device kit     1 kit    Use to test blood sugars 3 times daily or as directed.       blood glucose monitoring test strip    ONE TOUCH ULTRA    3 Box    Use to test blood sugars 3 times daily or as directed.       calcium citrate-vitamin D 315-250 MG-UNIT Tabs per tablet    CITRACAL    120 tablet    Take 2 tablets by mouth daily       cetirizine 10 MG tablet    zyrTEC    90 tablet    Take 1 tablet (10 mg) by mouth every evening       ciprofloxacin 250 MG tablet    CIPRO    10 tablet    Take 1 tablet (250 mg) by mouth 2 times daily       clopidogrel 75 MG tablet    PLAVIX    90 tablet    Take 1 tablet (75 mg) by mouth daily       CYANOCOBALAMIN PO      Take 2,000 mcg by mouth daily       diazepam 5 MG tablet    VALIUM    20 tablet    Take 1 tablet (5 mg) by mouth every 6 hours as needed for anxiety       DOCUSATE SODIUM PO      Take 100 mg by mouth daily       dorzolamide 2 % ophthalmic solution    TRUSOPT     Place 1 drop into both eyes 2 times daily       * EASY TOUCH  LANCETS 30G/TWIST Misc          * thin lancets    NO BRAND SPECIFIED    1 Box    Use to test blood sugar 3 times daily or as directed.       * blood glucose monitoring lancets     3 Box    Use to test blood sugar 3 times daily or as directed.       escitalopram 20 MG tablet    LEXAPRO    90 tablet    One per day       estradiol 1 MG tablet    ESTRACE    90 tablet    Take 1 tablet (1 mg) by mouth daily       famotidine 20 MG tablet    PEPCID    60 tablet    Take 1 tablet (20 mg) by mouth 2 times daily       ferrous sulfate 325 (65 FE) MG tablet    IRON    60 tablet    Take 1 tablet (325 mg) by mouth 2 times daily With meals       FLOMAX 0.4 MG capsule   Generic drug:  tamsulosin      Take 0.4 mg by mouth At Bedtime       fluticasone 50 MCG/ACT spray    FLONASE     Spray 1 spray into both nostrils daily       gabapentin 300 MG capsule    NEURONTIN    90 capsule    Take 1 capsule (300 mg) by mouth 3 times daily       hydrochlorothiazide 12.5 MG capsule    MICROZIDE    90 capsule    Take 1 capsule (12.5 mg) by mouth daily       HYDROmorphone 2 MG tablet    DILAUDID    30 tablet    Take 1 tablet (2 mg) by mouth every 3 hours as needed for moderate to severe pain       RENÉE Pack      Take 1 packet by mouth 2 times daily       lactulose 10 GM/15ML solution    CHRONULAC    946 mL    Take 30 mLs (20 g) by mouth daily as needed for constipation       latanoprost 0.005 % ophthalmic solution    XALATAN    2.5 mL    Place 1 drop into both eyes At Bedtime       levETIRAcetam 500 MG tablet    KEPPRA    180 tablet    Take 1 tablet (500 mg) by mouth 2 times daily       lidocaine 5 % Patch    LIDODERM    30 patch    Apply from 1 to 3 patches to painful area at once for up to 12 h within a 24 h period.  Remove after 12 hours.       lisinopril 2.5 MG tablet    PRINIVIL/Zestril    90 tablet    Take 1 tablet (2.5 mg) by mouth daily       MEDICATION GIVEN BY INTRATHECAL PUMP - INSTRUCTION      continuous 4/11/2016 per [facility] [phone]: Conc: Bupivacaine 20 mg/mL and Fentanyl 2000 mcg/mL. Continuous: Bupivacaine 5.052 mg/day and Fentanyl 505.2 mcg/day. Boluses: Up to 7 boluses per 24-hr period of Bupivicaine 0.5992 mg and Fentanyl 59.9 mcg Max daily dose: Bupivacaine 9.1973 mg/day and Fentanyl 919.5883 mcg/day  Pump Last Fill Date:  6/30/2016 Pump Refill Date: 9/20/2016       metoclopramide 5 MG tablet    REGLAN    240 tablet    Take 1 tablet (5 mg) by mouth 3 times daily (before meals)       metoprolol 25 MG 24 hr tablet    TOPROL-XL    30 tablet    Take 1 tablet (25 mg) by mouth daily       MULTIVITAMIN PO      Take 1 tablet by mouth daily       nitroglycerin 0.4 MG sublingual tablet    NITROSTAT    25 tablet    Place 1 tablet (0.4 mg) under the tongue every 5 minutes as needed for chest pain if you are still having symptoms after 3 doses (15 minutes) call 911.       ondansetron 4 MG ODT tab    ZOFRAN-ODT    120 tablet    Take 1 tablet (4 mg) by mouth every 6 hours       * order for DME     1 Month    Equipment being ordered: Depends briefs       * order for DME     1 Device    Equipment being ordered: Power Wheelchair       * order for DME     1 Units    Equipment being ordered: Nebulizer and supplies       * order for DME     1 Box    Equipment being ordered: Glucerna daily shakes with each meal       * order for DME     1 Device    ACcu check BID       * order for DME     1 Units    Equipment being ordered: Nebulizer and tubing supplies       * order for DME     1 Device    Equipment being ordered: CPAP supplies mask, hose, filters, cushion fax to [facility] at [phone] Disp: 10 DeviceRefills: prn Class: Local PrintStart: 2/10/2017       oxyCODONE-acetaminophen 5-325 MG per tablet    PERCOCET    60 tablet    Take 1-2 tablets every 3 hours as needed for pain.       pantoprazole 40 MG EC tablet    PROTONIX    60 tablet    Take 1 tablet (40 mg) by mouth 2 times daily Then QD       Phenytoin  Sodium Extended 200 MG Caps     60 capsule    Take 200 mg by mouth 2 times daily       polyethylene glycol powder    MIRALAX/GLYCOLAX     Take 17 g by mouth daily       pregabalin 75 MG capsule    LYRICA    90 capsule    Take 1 capsule (75 mg) by mouth 2 times daily (takes at 8 AM and 8 PM)       prochlorperazine 10 MG tablet    COMPAZINE    20 tablet    Take 1 tablet (10 mg) by mouth every 8 hours as needed       risperiDONE 1 MG tablet    risperDAL    90 tablet    Take 0.5 tablets (0.5 mg) by mouth At Bedtime       rOPINIRole 0.25 MG tablet    REQUIP    270 tablet    Take 3 tablets (0.75 mg) by mouth At Bedtime       SPIRIVA HANDIHALER 18 MCG capsule   Generic drug:  tiotropium     30 capsule    INHALE THE CONTENTS OF 1 CAPSULE VIA INHALATION DEVICE DAILY       * sucralfate 1 GM/10ML suspension    CARAFATE    1200 mL    Take 10 mLs (1 g) by mouth 4 times daily (before meals and nightly)       * sucralfate 1 GM tablet    CARAFATE    40 tablet    Take 1 tablet (1 g) by mouth 4 times daily May dissolve in 10 mL water is necessary. (Start upon completion of carafate suspension)       traZODone 100 MG tablet    DESYREL    90 tablet    Take 1 tablet (100 mg) by mouth At Bedtime       vitamin D 2000 UNITS tablet     100 tablet    Take 2,000 Units by mouth daily.       zinc 50 MG Tabs      Take 1 tablet by mouth daily       * Notice:  This list has 12 medication(s) that are the same as other medications prescribed for you. Read the directions carefully, and ask your doctor or other care provider to review them with you.

## 2017-02-22 NOTE — PATIENT INSTRUCTIONS
We will have you come in for a sleep study to adjust your CPAP machine.  Make sure you have your assisted living send you with your pills for the evening and for the next morning.  After the study, we will see about switching you over to [facility] for the CPAP equipment.

## 2017-02-22 NOTE — NURSING NOTE
"Chief Complaint   Patient presents with     Consult     start getting supplies from [place] and see if my machine is working okay.       Initial There were no vitals taken for this visit. Estimated body mass index is 21.14 kg/(m^2) as calculated from the following:    Height as of 1/9/17: 1.702 m (5' 7\").    Weight as of 2/6/17: 61.2 kg (135 lb).  Medication Reconciliation: complete   Neck circumference: 13.5 inches/34 cm      "

## 2017-02-22 NOTE — PROGRESS NOTES
Sleep Consultation:    Date on this visit: 2/22/2017    Sonya Foote is sent by Kirk Pedroza for a sleep consultation regarding untreated Obstructive Sleep Apnea and wanting to transfer GELY to [place].    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  Did have Restless Legs Syndrome before amputation but not anymore.    Sleep history:   10/26/2012 - Polysomnography at [facility] -  min; AHI 14; RDI 22; ERIN 5/hr; No PLMs or RBD.  11/16/2012 - Titration Polysomnography at [facility] - Started on CPAP and developed treatment-emergent central apneas.  Adaptive Servo-Ventilation titration optimal but did well on CPAP 8 as well (at least REM supine per comments). Put on CPAP 8 cmH2O.    Thinks she met compliance initially.  Lost CPAP in move and found it recently and got a new cord   Reports on CPAP she is less sleepy and has energy during the day.  Given mild disease, would only recommend treatment for symptom control.  Given EF <= 40% Adaptive Servo-Ventilation is now contraindicated in systolic CHF despite good control on testing.    Schedule - Currently living in assisted living with Toña (who has end-stage liver disease per Sonya).  Sets alarm on days she has to go somewhere.  Sometimes gets up around 5 AM. Other times she sleeps until the medication delivery service wakes her at between 7:30 and 7:50 AM. Napping every day around 2 PM for about 1 - 2 hours.  Typically getting into bed around 8 PM but takes several hours to fall asleep.  Typically listening to music or watching TV.  Wife is up all night and asleep during the day.      Sleep Disordered Breathing - Snores without CPAP on and snoring can get loud.  Snoring is audible in a different  room.  Reviewed download from CPAP:  ResMed   CPAP 7 (set at 8) cmH2O download:  7 total days of use. 0 nonuse days. 6 days with >4 hours use.  Average use 5 hours 59 minutes per day. Median Leak 2.2 L/min. 95%ile Leak 39.1 L/min. AHI 9.7.    Alcohol use - None  Caffeine intake - 2 cups/day of coffee. Last caffeine intake is usually in the morning.  Tobacco exposure - None  Illicit substances - None    Lives with wife, Toña.  No biological children  Has no pets.     Allergies:    Allergies   Allergen Reactions     Bee Venom      Penicillins Anaphylaxis     Other reaction(s): Skin Rash and/or Hives     Dilantin [Phenytoin] Other (See Comments)     Generic dilantin only per pt     Iodide Hives     09/11/15: Pt states she can use iodine and doesn't have any problems      Iodine-131      Novocaine [Procaine] Hives     Other reaction(s): Skin Rash and/or Hives       Medications:    Current Outpatient Prescriptions   Medication Sig Dispense Refill     famotidine (PEPCID) 20 MG tablet Take 1 tablet (20 mg) by mouth 2 times daily 60 tablet 11     sucralfate (CARAFATE) 1 GM tablet Take 1 tablet (1 g) by mouth 4 times daily May dissolve in 10 mL water is necessary. (Start upon completion of carafate suspension) 40 tablet 5     order for DME Equipment being ordered: CPAP supplies mask, hose, filters, cushion fax to [facility] at [phone]  Disp: 10 Device Refills: prn   Class: Local Print Start: 2/10/2017 1 Device 0     ciprofloxacin (CIPRO) 250 MG tablet Take 1 tablet (250 mg) by mouth 2 times daily 10 tablet 0     pantoprazole (PROTONIX) 40 MG EC tablet Take 1 tablet (40 mg) by mouth 2 times daily Then QD 60 tablet 5     order for DME Equipment being ordered: Nebulizer and tubing supplies 1 Units 0     lidocaine (LIDODERM) 5 % Patch Apply from 1 to 3 patches to painful area at once for up to 12 h within a 24 h period.  Remove after 12 hours. 30 patch 1     oxyCODONE-acetaminophen (PERCOCET) 5-325 MG per tablet Take  1-2 tablets every 3 hours as needed for pain. 60 tablet 0     albuterol (2.5 MG/3ML) 0.083% neb solution INHALE 1 VIAL VIA NEBULIZER EVERY 6 HOURS AS NEEDED 360 mL 11     sucralfate (CARAFATE) 1 GM/10ML suspension Take 10 mLs (1 g) by mouth 4 times daily (before meals and nightly) 1200 mL 2     order for DME ACcu check BID 1 Device 0     ondansetron (ZOFRAN-ODT) 4 MG ODT tab Take 1 tablet (4 mg) by mouth every 6 hours 120 tablet 0     metoprolol (TOPROL-XL) 25 MG 24 hr tablet Take 1 tablet (25 mg) by mouth daily 30 tablet 0     metoclopramide (REGLAN) 5 MG tablet Take 1 tablet (5 mg) by mouth 3 times daily (before meals) 240 tablet 0     CYANOCOBALAMIN PO Take 2,000 mcg by mouth daily       gabapentin (NEURONTIN) 300 MG capsule Take 1 capsule (300 mg) by mouth 3 times daily 90 capsule 3     Nutritional Supplements (RENÉE) PACK Take 1 packet by mouth 2 times daily       polyethylene glycol (MIRALAX/GLYCOLAX) powder Take 17 g by mouth daily       HYDROmorphone (DILAUDID) 2 MG tablet Take 1 tablet (2 mg) by mouth every 3 hours as needed for moderate to severe pain 30 tablet 0     fluticasone (FLONASE) 50 MCG/ACT nasal spray Spray 1 spray into both nostrils daily       ADVAIR DISKUS 250-50 MCG/DOSE diskus inhaler Inhale 2 puffs into the lungs daily       calcium citrate-vitamin D (CITRACAL) 315-250 MG-UNIT TABS Take 2 tablets by mouth daily 120 tablet 5     pregabalin (LYRICA) 75 MG capsule Take 1 capsule (75 mg) by mouth 2 times daily (takes at 8 AM and 8 PM) 90 capsule 3     ferrous sulfate (IRON) 325 (65 FE) MG tablet Take 1 tablet (325 mg) by mouth 2 times daily With meals 60 tablet 2     hydrochlorothiazide (MICROZIDE) 12.5 MG capsule Take 1 capsule (12.5 mg) by mouth daily 90 capsule 3     lisinopril (PRINIVIL,ZESTRIL) 2.5 MG tablet Take 1 tablet (2.5 mg) by mouth daily 90 tablet 3     escitalopram (LEXAPRO) 20 MG tablet One per day 90 tablet 3     estradiol (ESTRACE) 1 MG tablet Take 1 tablet (1 mg) by mouth  daily 90 tablet 3     levETIRAcetam (KEPPRA) 500 MG tablet Take 1 tablet (500 mg) by mouth 2 times daily (Patient taking differently: Take 500 mg by mouth 3 times daily ) 180 tablet 1     traZODone (DESYREL) 100 MG tablet Take 1 tablet (100 mg) by mouth At Bedtime 90 tablet 3     rOPINIRole (REQUIP) 0.25 MG tablet Take 3 tablets (0.75 mg) by mouth At Bedtime 270 tablet 1     aspirin EC 81 MG EC tablet Take 1 tablet (81 mg) by mouth daily 90 tablet 3     clopidogrel (PLAVIX) 75 MG tablet Take 1 tablet (75 mg) by mouth daily (Patient taking differently: Take 75 mg by mouth At Bedtime ) 90 tablet 3     risperiDONE (RISPERDAL) 1 MG tablet Take 0.5 tablets (0.5 mg) by mouth At Bedtime 90 tablet 0     blood glucose monitoring (ONE TOUCH ULTRA 2) meter device kit Use to test blood sugars 3 times daily or as directed. 1 kit 0     blood glucose monitoring (ONE TOUCH ULTRA) test strip Use to test blood sugars 3 times daily or as directed. 3 Box 3     blood glucose monitoring (ONE TOUCH ULTRASOFT) lancets Use to test blood sugar 3 times daily or as directed. 3 Box 3     phenytoin 200 MG CAPS Take 200 mg by mouth 2 times daily (Patient taking differently: Take 200 mg by mouth 2 times daily (takes at 8 AM and 8 PM)) 60 capsule 0     DOCUSATE SODIUM PO Take 100 mg by mouth daily       tamsulosin (FLOMAX) 0.4 MG 24 hr capsule Take 0.4 mg by mouth At Bedtime       dorzolamide (TRUSOPT) 2 % ophthalmic solution Place 1 drop into both eyes 2 times daily        diazepam (VALIUM) 5 MG tablet Take 1 tablet (5 mg) by mouth every 6 hours as needed for anxiety 20 tablet 1     SPIRIVA HANDIHALER 18 MCG inhalation capsule INHALE THE CONTENTS OF 1 CAPSULE VIA INHALATION DEVICE DAILY 30 capsule 2     zinc 50 MG TABS Take 1 tablet by mouth daily       cetirizine (ZYRTEC) 10 MG tablet Take 1 tablet (10 mg) by mouth every evening 90 tablet 3     MEDICATION GIVEN BY INTRATHECAL PUMP - INSTRUCTION continuous 4/11/2016 per [facility] [place] [phone]:  Conc: Bupivacaine 20 mg/mL and Fentanyl 2000 mcg/mL.  Continuous: Bupivacaine 5.052 mg/day and Fentanyl 505.2 mcg/day.  Boluses: Up to 7 boluses per 24-hr period of Bupivicaine 0.5992 mg and Fentanyl 59.9 mcg  Max daily dose: Bupivacaine 9.1973 mg/day and Fentanyl 919.5883 mcg/day    Pump Last Fill Date:  6/30/2016  Pump Refill Date: 9/20/2016       atorvastatin (LIPITOR) 40 MG tablet Take 1 tablet (40 mg) by mouth daily (Patient taking differently: Take 40 mg by mouth At Bedtime ) 90 tablet 3     lactulose (CHRONULAC) 10 GM/15ML solution Take 30 mLs (20 g) by mouth daily as needed for constipation 946 mL 11     order for DME Equipment being ordered: Glucerna daily shakes with each meal 1 Box 11     prochlorperazine (COMPAZINE) 10 MG tablet Take 1 tablet (10 mg) by mouth every 8 hours as needed 20 tablet 0     thin (NO BRAND SPECIFIED) lancets Use to test blood sugar 3 times daily or as directed. 1 Box 10     ORDER FOR DME Equipment being ordered: Nebulizer and supplies 1 Units 0     ORDER FOR DME Equipment being ordered: Power Wheelchair 1 Device 0     ORDER FOR DME Equipment being ordered: Depends briefs 1 Month 11     EASY TOUCH LANCETS 30G/TWIST MISC        Cholecalciferol (VITAMIN D) 2000 UNITS tablet Take 2,000 Units by mouth daily. 100 tablet 3     Multiple Vitamin (MULTIVITAMIN OR) Take 1 tablet by mouth daily        nitroglycerin (NITROSTAT) 0.4 MG SL tablet Place 1 tablet (0.4 mg) under the tongue every 5 minutes as needed for chest pain if you are still having symptoms after 3 doses (15 minutes) call 911. (Patient not taking: Reported on 2/22/2017) 25 tablet 1     latanoprost (XALATAN) 0.005 % ophthalmic solution Place 1 drop into both eyes At Bedtime 2.5 mL 11       Problem List:  Patient Active Problem List    Diagnosis Date Noted     JOSE (obstructive sleep apnea) 02/22/2017     Priority: Medium     10/26/2012 - Pol[facility]om[facility] at [facility] -  min; AHI 14;  RDI 22; ERIN 5/hr; No PLMs or RBD.  11/16/2012 - Titration Polysomnography at [facility] - Started on CPAP and developed treatment-emergent central apneas.  Adaptive Servo-Ventilation titration optimal but did well on CPAP 8 as well. Put on CPAP 8 cmH2O.    Has systolic CHF, intrathecal narcotic pump, and treatment-emergent Central Sleep Apnea on CPAP.       Gastroesophageal reflux disease without esophagitis 01/02/2017     Priority: Medium     Pyelonephritis 12/22/2016     Priority: Medium     Primary open angle glaucoma of both eyes, severe stage 12/08/2016     Priority: Medium     Pseudophakia of right eye 12/08/2016     Priority: Medium     Cataract, left eye 12/08/2016     Priority: Medium     Diabetes mellitus type 2 without retinopathy (H) 12/08/2016     Priority: Medium     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 (H) 11/23/2016     Priority: Medium     Critical lower limb ischemia 11/07/2016     Priority: Medium     Anemia, unspecified type 10/22/2016     Priority: Medium     Type 2 diabetes mellitus with diabetic peripheral angiopathy without gangrene, without long-term current use of insulin (H) 10/12/2016     Priority: Medium     Angina pectoris, crescendo (H) 09/17/2016     Priority: Medium     Cellulitis of right ankle 09/07/2016     Priority: Medium     Essential hypertension with goal blood pressure less than 130/80 09/02/2016     Priority: Medium     Atherosclerotic peripheral vascular disease with rest pain (H) 06/06/2016     Priority: Medium     Primary osteoarthritis of both hands 05/19/2016     Priority: Medium     Pain in both upper extremities 05/19/2016     Priority: Medium     Carotid stenosis, left 04/01/2016     Priority: Medium     Chronic systolic congestive heart failure (H) 03/08/2016     Priority: Medium     Restless legs syndrome (RLS) 02/18/2016     Priority: Medium     PAD (peripheral artery disease) (H) 09/14/2015     Priority: Medium     Health Care Home 05/04/2015      "Priority: Medium     [facility]   Jamie Sharma RN  [phone]    [facility] CARE PLAN SUMMARY    Client Name:  Sonya Foote  Address:  [address] [place] [address]  [address]  [facility] [address] Phone: [phone] (Cell)   :  1950 Date of Assessment:  16 (at TCU)  Visit at AL after discharge:  16   [facility]:  [facility]  Health Plan Number: [id number] Medical Assistance Number: [id number]  Financial Worker:  [facility]  Case #:  [id number]   [facility] :  Jamie Sharma RN  Phone:  [phone]  CM Fax:  [phone]   [facility] Enrollment Date: 4/1/15 Case Mix:  E  Rate Cell:  B  Waiver Type:  EW   Emergency Contact:  Extended Emergency Contact Information  Primary Emergency Contact: Jayde Carranza  Mobile Phone: [phone]  Relation: Spouse  Secondary Emergency Contact: PRASHANTH CARRANZA  Home Phone: [phone]  Mobile Phone: [phone]  Relation: Daughter Language:  English  :  No   Health Care Agent/POA:  Jayde Carranza (spouse).   1st Alternate: Prashanthjaclyn Carranza (daughter)   2nd Alternate: Kam-Accon Carranza (son) Advanced Directives/Living Will:  Yes   Primary Care Clinic/Phone/Fax:  [facility]/(p) [phone], (f) [phone] Primary Dx:  E11.51 Diabetes  Secondary Dx:  V49.76 Right AKA   Primary Physician:  Allan Casey   Height:  5' 7\"  Weight:  143 lbs   Specialty Physician:     [facility] - [place] Audiologist:  ROCÍO   Eye Care Provider:  [facility] [facility] Provider:    [facility] [phone]   Other:  ARHMS Worker - Cary Junior (Family Support Services - [facility]) - [phone]          [facility] CURRENT SERVICES SUMMARY  Equipment owned/DME history: power chair, manual w/c, standard walker, 4 wheeled walker w/ seat; cane  Transfer tub bench, safety pole w/ super bar; drop arm commode, transfer board, talking watch, reacher   SERVICE TYPE/PROVIDER NAME/PHONE AUTH DATE FREQUENCY Units OR $ Amt " DESCRIPTION   Medical Transportation: [employer] [phone] 12/1/16 - 11/30/17 as needed N/A N/A   Supplies: [employer] [phone] 12/1/16 - 11/30/17 monthly N/A Pull ups (size medium) 3-4 per day    Glucerna 2 cans/day      [employer] tickets through [employer] [facility] (EW) 12/1/16 - 11/30/17 monthly  10 rush  10 non rush     [facility] [phone]   12/19/16 RN - eval and treat      PT/OT - eval and   treat N/A      Assisted Living: [facility]  [phone]   Fax: [phone] 24 hr [facility]ized Living  (RN - [phone]) 12/19/16 - 12/13/17 daily See RS/AL Tool      Supplies: [employer]   [phone]  Fax: [phone]  (formerly [email or web address]) 12/19/16 1x order  Transfer Board ([place])    Drop Arm Commode ([place])     Supplies: [facility]   [phone]  Fax: [phone]  (formerly [place]) 12/19/16 1x fee $65.00 1x fee Delivery charge of equip (EW)     DME: [email or web address] [facility] [phone]  Fax: [phone]  [facility] [email or web address] (previously [place]) 1/19/17 1x order $26.90 CPAP power cord for Resmed S9 (EW)     DME: [email or web address] [facility] [phone]  Fax: [phone]  [facility] (previously [place]) 2/8/17 1x order $96.00 CPAP S9 90w power supply unit (EW)          Cervicalgia 01/15/2015     Priority: Medium     GIB (gastrointestinal bleeding) 08/21/2014     Priority: Medium     Intestinal malabsorption 08/06/2014     Priority: Medium     updating diagnosis code for icd10 cutover       Claudication in peripheral vascular disease (H) 04/22/2014     Priority: Medium     Person who has had sex change operation 01/20/2014     Priority: Medium     Vertigo 11/08/2013     Priority: Medium     AS (sickle cell trait) (H) 10/08/2013     Priority: Medium     Schizoaffective disorder (H) 06/07/2013     Priority: Medium     Osteoporosis 01/21/2013     Priority: Medium     Chronic obstructive pulmonary disease, unspecified COPD type (H) 01/01/2013      Priority: Medium     ACP (advance care planning) 09/13/2012     Priority: Medium     Advance Care Planning 7/19/2016: ACP Review of Chart / Resources Provided:  Reviewed chart for advance care plan.  Sonya Foote has an up to date advance care plan on file.  Added by Jamie Sharma  Advance Care Planning 5/5/2016: ACP Review of Chart / Resources Provided:  Reviewed chart for advance care plan.  Sonya Foote has an up to date advance care plan on file.  Added by Jamie Sharma    Patient states has Advance Directive and will bring in a copy to clinic. 9/13/2012          Hyperlipidemia LDL goal <100 09/04/2012     Priority: Medium     Seizure disorder (H) 09/04/2012     Priority: Medium     Adjustment disorder with depressed mood 09/16/2009     Priority: Medium     Central retinal artery occlusion 08/19/2009     Priority: Medium     Anxiety disorder due to general medical condition 07/29/2009     Priority: Medium     History of colonic polyps 07/13/2009     Priority: Medium     Overview:   Colonoscopy completed on July 2009.  See report for full detail.  Please re refer in 5 years for repeat colon cancer screening if medically appropriate.  Need colyte 4/2 preparation.       Hereditary and idiopathic peripheral neuropathy 07/10/2009     Priority: Medium     Peripheral nerve disease (H) 07/10/2009     Priority: Medium     Androgen insensitivity syndrome 03/23/2009     Priority: Medium     Testicular feminization 03/23/2009     Priority: Medium     Chronic pain syndrome 03/20/2009     Priority: Medium     Patient is followed by Allan Casey for ongoing prescription of pain meds  All refills should be approved by this provider, or covering partner.    Maximum quantity per month: 1 month  Clinic visit frequency required: Q 6  months     Controlled substance agreement:  Encounter-Level CSA:     There are no encounter-level csa.        Pain Clinic evaluation in the past: No    DIRE Total Score(s):  No flowsheet data  found.    Last [facility] website verification:  none   [email or web address]       Thoracic or lumbosacral neuritis or radiculitis 03/20/2009     Priority: Medium     Lumbosacral radiculitis 03/20/2009     Priority: Medium     Prediabetes 02/26/2008     Priority: Medium     Disorder of bone and cartilage 05/24/2007     Priority: Medium     Osteopenia 05/24/2007     Priority: Medium     Anxiety disorder 04/25/2007     Priority: Medium     Late effects of cerebrovascular disease 07/13/2006     Priority: Medium     Cerebral infarction due to occlusion or stenosis of carotid artery 02/06/2006     Priority: Medium     Problem list name updated by automated process. Provider to review       Iron deficiency anemia 11/18/2005     Priority: Medium     Degeneration of intervertebral disc of lumbosacral region 11/18/2005     Priority: Medium     Overview:   with radiculopathy       Old myocardial infarction 08/02/2005     Priority: Medium     Open-angle glaucoma 08/11/2003     Priority: Medium     Asthma 04/04/2003     Priority: Medium        Past Medical/Surgical History:  Past Medical History   Diagnosis Date     Anemia      Antiplatelet or antithrombotic long-term use      Arthritis      AS (sickle cell trait) (H) 10/8/2013     Blind left eye      BPPV (benign paroxysmal positional vertigo)      Chronic back pain      COPD (chronic obstructive pulmonary disease) (H)      Coronary artery disease      CVA (cerebral infarction) 10/8/2012     x3, residual left sided weakness     Diastolic CHF (H) 9/4/2012     Dilantin toxicity 5/31/2013     Esophageal candidiasis (H)      GERD (gastroesophageal reflux disease)      Heart attack (H)      History of blood transfusion      x5     HTN (hypertension) 9/4/2012     Hyperlipidemia LDL goal <100 9/4/2012     Legally blind in right eye, as defined in [place]      No periphal vision right eye     Osteoporosis 1/21/2013     Other chronic pain      PAD (peripheral artery disease) (H)       Person who has had sex change operation 1/20/2014     Pneumonia      Schizoaffective disorder (H)      Seizure disorder (H) 9/4/2012     Seizures (H)      Sleep apnea      CPAP     Thrombosis of leg      Type 2 diabetes, HbA1C goal < 8% (H) 9/4/2012     Uncomplicated asthma      Unspecified cerebral artery occlusion with cerebral infarction      Past Surgical History   Procedure Laterality Date     Appendectomy       Tonsillectomy       Cholecystectomy       Orthopedic surgery       broken wrist repair     Breast surgery       right breast bx (benign)     Sinus surgery       cyst removed     Esophagoscopy, gastroscopy, duodenoscopy (egd), combined  12/14/2012     Procedure: COMBINED ESOPHAGOSCOPY, GASTROSCOPY, DUODENOSCOPY (EGD), BIOPSY SINGLE OR MULTIPLE;  ESOPHAGOSCOPY, GASTROSCOPY, DUODENOSCOPY (EGD), DILATATION ;  Surgeon: Elizabeth Stevenson MD;  Location:  GI     Vascular surgery       Left carotid stent     Esophagoscopy, gastroscopy, duodenoscopy (egd), combined  12/31/2013     Procedure: COMBINED ESOPHAGOSCOPY, GASTROSCOPY, DUODENOSCOPY (EGD), BIOPSY SINGLE OR MULTIPLE;;  Surgeon: Clemente Lopez MD;  Location:  GI     Sex transformation surgery, male to female       1974     Esophagoscopy, gastroscopy, duodenoscopy (egd), combined  4/1/2014     Procedure: COMBINED ESOPHAGOSCOPY, GASTROSCOPY, DUODENOSCOPY (EGD);;  Surgeon: Clemente Lopez MD;  Location:  GI     Endarterectomy femoral  5/23/2014     Procedure: ENDARTERECTOMY FEMORAL;  Surgeon: Jason Joshi MD;  Location:  OR     Esophagoscopy, gastroscopy, duodenoscopy (egd), combined  6/28/2014     Procedure: COMBINED ESOPHAGOSCOPY, GASTROSCOPY, DUODENOSCOPY (EGD);  Surgeon: Clemente Lopez MD;  Location:  GI     Colonoscopy with co2 insufflation N/A 8/20/2014     Procedure: COLONOSCOPY WITH CO2 INSUFFLATION;  Surgeon: Duane, William Charles, MD;  Location:  OR     Esophagoscopy, gastroscopy, duodenoscopy (egd), combined N/A 8/20/2014      Procedure: COMBINED ESOPHAGOSCOPY, GASTROSCOPY, DUODENOSCOPY (EGD), BIOPSY SINGLE OR MULTIPLE;  Surgeon: Duane, William Charles, MD;  Location: [facility]     Esophagoscopy, gastroscopy, duodenoscopy (egd), combined N/A 8/22/2014     Procedure: COMBINED ESOPHAGOSCOPY, GASTROSCOPY, DUODENOSCOPY (EGD), BIOPSY SINGLE OR MULTIPLE;  Surgeon: Mello Ferrer MD;  Location: [facility]     Hc capsule endoscopy N/A 8/25/2014     Procedure: CAPSULE/PILL CAM ENDOSCOPY;  Surgeon: Remy Haskins MD;  Location: [facility]     Colonoscopy N/A 8/25/2014     Procedure: COLONOSCOPY;  Surgeon: Mello Ferrer MD;  Location: [facility]     Hc capsule endoscopy N/A 10/2/2014     Procedure: CAPSULE/PILL CAM ENDOSCOPY;  Surgeon: Remy Haskins MD;  Location: [facility]     Esophagoscopy, gastroscopy, duodenoscopy (egd), combined N/A 10/2/2014     Procedure: COMBINED ESOPHAGOSCOPY, GASTROSCOPY, DUODENOSCOPY (EGD), BIOPSY SINGLE OR MULTIPLE;  Surgeon: Remy Haskins MD;  Location: [facility]     Angiogram Bilateral 11/21/2014     Procedure: ANGIOGRAM;  Surgeon: Savannah Durant MD;  Location: [facility]     Esophagoscopy, gastroscopy, duodenoscopy (egd), combined Left 12/15/2014     Procedure: COMBINED ESOPHAGOSCOPY, GASTROSCOPY, DUODENOSCOPY (EGD), BIOPSY SINGLE OR MULTIPLE;  Surgeon: Remy Haskins MD;  Location: [facility]     Angiogram Left 1/16/2015     Procedure: ANGIOGRAM;  Surgeon: Savannah Durant MD;  [facility]     Esophagoscopy, gastroscopy, duodenoscopy (egd), combined N/A 2/25/2015     Procedure: COMBINED ENDOSCOPIC ULTRASOUND, ESOPHAGOSCOPY, GASTROSCOPY, DUODENOSCOPY (EGD), FINE NEEDLE ASPIRATE/BIOPSY;  Surgeon: Clemente Lugo MD;  Location: [facility]     Esophagoscopy, gastroscopy, duodenoscopy (egd), combined Left 2/25/2015     Procedure: COMBINED ESOPHAGOSCOPY, GASTROSCOPY, DUODENOSCOPY (EGD), BIOPSY SINGLE OR MULTIPLE;  Surgeon: Clemente Lugo MD;  Location: [facility]     Angiogram Bilateral 9/14/2015     Procedure:  ANGIOGRAM;  Surgeon: Savannah Durant MD;  Location: [facility]     Angiogram Left 10/12/2015     Procedure: ANGIOGRAM;  Surgeon: Savannah Durant MD;  Location: [facility]     Amputate toe(s) Right 1/5/2016     Procedure: AMPUTATE TOE(S);  Surgeon: Mello Gaines DPM;  Location: [facility]     Amputate leg above knee Left 6/11/2016     Procedure: AMPUTATE LEG ABOVE KNEE;  Surgeon: Mello Rodriguez MD;  [facility]     Fasciotomy lower extremity Left 6/10/2016     Procedure: FASCIOTOMY LOWER EXTREMITY;  Surgeon: Mello Rodriguez MD;  [facility]     Angiogram Right 6/6/2016     Procedure: ANGIOGRAM;  Surgeon: Savannah Durant MD;  [facility]     Angioplasty Right 6/6/2016     Procedure: ANGIOPLASTY;  Surgeon: Savannah Durant MD;  [facility]     Esophagoscopy, gastroscopy, duodenoscopy (egd), combined N/A 9/25/2016     Procedure: COMBINED ESOPHAGOSCOPY, GASTROSCOPY, DUODENOSCOPY (EGD);  Surgeon: Aziza Patiño MD;  [facility]     Amputate leg below knee Right 11/7/2016     Procedure: AMPUTATE LEG BELOW KNEE;  Surgeon: Savannah Durant MD;  [facility]     Amputate revision stump lower extremity Right 11/11/2016     Procedure: AMPUTATE REVISION STUMP LOWER EXTREMITY;  Surgeon: Savannah Durant MD;  [facility]     Amputate revision stump lower extremity Right 11/16/2016     Procedure: AMPUTATE REVISION STUMP LOWER EXTREMITY;  Surgeon: Savannah Durant MD;  [facility]     Esophagoscopy, gastroscopy, duodenoscopy (egd), combined N/A 1/18/2017     Procedure: COMBINED ESOPHAGOSCOPY, GASTROSCOPY, DUODENOSCOPY (EGD), BIOPSY SINGLE OR MULTIPLE;  Surgeon: Clemente Lopez MD;  [facility]       Social History:  Social History     Social History     Marital status:      Spouse name: Jayde     Number of children: 2     Years of education: N/A     Occupational History     mental health worker      retired     Social History Main Topics     Smoking status: Former Smoker     Packs/day: 2.50     Years:  "30.00     Types: Cigarettes, Cigars     Quit date: 11/1/2000     Smokeless tobacco: Never Used     Alcohol use No     Drug use: No     Sexual activity: Yes     Other Topics Concern     Caffeine Concern No     1 in the morning     Social History Narrative    The patient is a former smoker.  She smoked 2.5 packs per year for approximately 30 years.  She quit in 2000 using Wellbutrin and patches.  Her father was in the  and she moved around a lot when she was a kid, and has lived abroad including in [facility] and the [facility].  She was previously employed as a mental health worker. Moved from [place] to [place] in 2012. She is currently living in a senior living apartment, and describes her relationship status as a \" demarco couple.\"  She manages her own medications.  She has no recent travel and no known TB exposures.         Family History:  Family History   Problem Relation Age of Onset     CANCER Maternal Aunt      leukemia     Schizophrenia Brother      Depression Brother      Suicide Sister      Depression Sister      DIABETES Sister      Dementia Mother      Glaucoma Mother      DIABETES Mother      Coronary Artery Disease Mother      MI     CANCER Maternal Aunt      ovarian     Glaucoma Father      DIABETES Father      Heart Failure Father      Glaucoma Maternal Grandmother      DIABETES Maternal Grandmother      Glaucoma Maternal Grandfather      DIABETES Maternal Grandfather      Glaucoma Paternal Grandmother      DIABETES Paternal Grandmother      Glaucoma Paternal Grandfather      DIABETES Paternal Grandfather      Breast Cancer Sister      CEREBROVASCULAR DISEASE Brother        Review of Systems:  A complete review of systems reviewed by me is negative with the exeption of what has been mentioned in the history of present illness.  CONSTITUTIONAL:  POSITIVE for  weight loss and sweats  EYES:  POSITIVE for changes in vision and blind spots  ENT:  POSITIVE for  sore throat, sinus pain, " "post-nasal drip, runny nose and bloody nose  CARDIAC:  POSITIVE for  fast heart beats, fluttering in chest, chest pain, chest pressure, breathlessness when lying flat, swollen legs and swollen feet  NEUROLOGIC:  POSITIVE for  headaches and weakness or numbness in the arms or legs  DERMATOLOGIC:  POSITIVE for  rashes  PULMONARY:  POSITIVE for  SOB at rest, SOB with activity, dry cough, productive cough, coughing up blood and wheezing   GASTROINTESTINAL:  POSITIVE for  nausea, vomiting, loose or watery stools and constipation  GENITOURINARY:  POSITIVE for  urinating more frequently than usual  MUSCULOSKELETAL:  POSITIVE for  bone or joint pain and swollen joints  ENDOCRINE:  POSITIVE for  increased thirst, increased urination and diabetes  LYMPHATIC:  POSITIVE for  swollen lymph nodes, lumps or bumps in breasts and nipple discharge    Physical Examination:  Vitals: /62  Pulse 81  Wt 59 kg (130 lb)  SpO2 96%  BMI 20.36 kg/m2  BMI= Body mass index is 20.36 kg/(m^2).    Mousie Total Score 2/22/2017   Total score - Mousie 16     General appearance: No apparent distress, well groomed.    HEENT:   Head: Normocephalic, atraumatic.  Eyes: PERRL.  Wearing large glasses and light-blocking shades  Nose: Nares widely patent.  No exudate.  No septal deviation noted.  Mouth: Teeth: edentulous                Neck: Supple without bruit.     Neck Cir (cm): 34 cm (2/22/2017 11:06 AM)    MSK: Bilateral AKA  Neurologic: Alert and oriented to person, place and time   Wheelchair bound  Psychiatric: Affect pleasant.  Mood \"pretty good\".     Impression/Plan:  1. JOSE (obstructive sleep apnea)  2. Chronic systolic congestive heart failure (H)  3. Chronic pain syndrome   Patient has a history of mild-to-moderate sleep-disordered breathing notable for Obstructive Sleep Apnea and Central Sleep Apnea with treatment emergent Central Sleep Apnea on CPAP.  When she had her titration she had good results on Adaptive Servo-Ventilation but " Adaptive Servo-Ventilation is now contraindicated in Systolic CHF.  CPAP was used but residual disease is > 50% of baseline.   Given findings on download and history would recommend slow careful titration CPAP study.  She has high risk for Central Sleep Apnea due to intrathecal pain pump and systolic CHF.  Would start on CPAP 5 cmH2O and look for NREM control as marker of success.  No bilevel or Adaptive Servo-Ventilation on study.   Discussed not going on treatment all together, but she gets clear symptomatic benefit from treatment and would like to continue.    Orders Placed This Encounter   Procedures     Comprehensive Sleep Study     Literature provided regarding sleep apnea.      She will follow up with me in approximately two weeks after her sleep study has been competed to review the results and discuss plan of care.       Polysomnography reviewed.  Obstructive sleep apnea reviewed.  Complications of untreated sleep apnea were reviewed.    Total time of care was 62 minutes, with > 50% of time spent on counseling and coordination of care.      Guanaco Kowalski MD, Sleep Physician  Feb 22, 2017     CC: Kirk Pedroza

## 2017-02-22 NOTE — TELEPHONE ENCOUNTER
famotidine (PEPCID) 20 MG tablet      Last Written Prescription Date: 12/28/2016  Last Fill Quantity: 60,  # refills: 0   Last Office Visit with [facility], [facility] or [facility] prescribing provider: 2/06/2017

## 2017-02-24 NOTE — PROGRESS NOTES
2/22/17:  See [facility] notes re: rx for CPAP supplies.  Member had visit with [facility] today - member called CM from appt and had CM speak with someone to get rx from them to speed process  - they state they use [facility] and will transfer member for her supplies there.   After appt, member called CM and states that she is having a sleep study on Sunday 2/26 and may need new CPAP then so since she has her old mask and tubing and can currently use she will wait until after sleep study to obtain supplies from them if needed.   She states this will be easier as they can follow.     Jamie Sharma RN, PHN  [employer] [facility]

## 2017-02-24 NOTE — PROGRESS NOTES
2/24/17: Member called ELVIRA to ask about nebulizer supplies.  CM reminded member that CM did not order - member ordered on her own and received rx from lung  per Epic review - she agreed.  She called [facility] - was told they were sent 2/16 but she has not received yet.  She is waiting for call back.     Member states she has issue last evenign at [facility] with toilet backing up - she cleaned up herself - CM explained that [facility] staff should be doing this - she states she called for them and no one came so she cleaned up.  She states [facility] is going through change in ownership right now - going to non-profit and things are not being handled.  She missed her medication recently as they had no reordered on time.  She did complain to DON and also to new management and explained to them all of her concerns/issues.  Elvira asked member if CM should contact nursing - she stated she has already complained and has her meds.  Discuss moving to new [facility] if things do not get better.  Member states she does not want to move again nor has the money for moving costs after move to [place] in July.   Will continue to discuss with member if no improvement.      ELVIRA informed member that Elvira sent f/u email to Luisana at [employer] in regards to her pharmacy claims - no reply at this time.      Jamie Sharma RN, N  [facility]

## 2017-02-26 ENCOUNTER — THERAPY VISIT (OUTPATIENT)
Dept: SLEEP MEDICINE | Facility: CLINIC | Age: 67
End: 2017-02-26
Payer: COMMERCIAL

## 2017-02-26 DIAGNOSIS — G47.39 COMPLEX SLEEP APNEA SYNDROME: Primary | ICD-10-CM

## 2017-02-26 DIAGNOSIS — G47.33 OSA (OBSTRUCTIVE SLEEP APNEA): ICD-10-CM

## 2017-02-26 PROCEDURE — 95811 POLYSOM 6/>YRS CPAP 4/> PARM: CPT | Performed by: INTERNAL MEDICINE

## 2017-02-26 NOTE — MR AVS SNAPSHOT
After Visit Summary   2/26/2017    Sonya Foote    MRN: [id number]           Patient Information     Date Of Birth          1950        Visit Information        Provider Department      2/26/2017 8:00 PM BK BED 2 [facility]        Today's Diagnoses     JOSE (obstructive sleep apnea)           Follow-ups after your visit        Your next 10 appointments already scheduled     Mar 03, 2017 10:50 AM CST   (Arrive by 10:20 AM)   RETURN DIABETES with Michelle Irizarry MD   [facility] ([facility])    [address] [place] [id number] [phone]            Mar 06, 2017 10:00 AM CST   SHORT with Allan Casey MD   [facility] ([facility])    [address] [place] [id number] [phone]            Mar 13, 2017  9:00 AM CDT   US CAROTID BILATERAL with [email or web address]   [facility] ([facility])    [address] [place] [id number] [phone]           Please bring a list of your medicines (including vitamins, minerals and over-the-counter drugs). Also, tell your doctor about any allergies you may have. Wear comfortable clothes and leave your valuables at home.  You do not need to do anything special to prepare for your exam.  Please call the Imaging Department at your exam site with any questions.            Mar 15, 2017  2:30 PM CDT   Return Sleep Patient with MD [facility] ([facility])    [address] [place] [id number]   [phone]            Jul 10, 2017 11:00 AM CDT   (Arrive by 10:45 AM)   Return Visit with Alina Jennings MD   [facility] ([facility])    [address] [place] [id number]  "  [phone]              Who to contact     If you have questions or need follow up information about today's clinic visit or your schedule please contact [facility] directly at [phone].  Normal or non-critical lab and imaging results will be communicated to you by MyChart, letter or phone within 4 business days after the clinic has received the results. If you do not hear from us within 7 days, please contact the clinic through MyChart or phone. If you have a critical or abnormal lab result, we will notify you by phone as soon as possible.  Submit refill requests through [employer] or call your pharmacy and they will forward the refill request to us. Please allow 3 business days for your refill to be completed.          Additional Information About Your Visit        [employer] Information     [employer] lets you send messages to your doctor, view your test results, renew your prescriptions, schedule appointments and more. To sign up, go to [email or web address] . Click on \"Log in\" on the left side of the screen, which will take you to the Welcome page. Then click on \"Sign up Now\" on the right side of the page.     You will be asked to enter the access code listed below, as well as some personal information. Please follow the directions to create your username and password.     Your access code is: [email or web address]  Expires: 3/26/2017  6:30 AM     Your access code will  in 90 days. If you need help or a new code, please call your [place] clinic or [phone].        Care EveryWhere ID     This is your Care EveryWhere ID. This could be used by other organizations to access your [place] medical records  [id number]         Blood Pressure from Last 3 Encounters:   17 113/62   17 138/78   17 113/66    Weight from Last 3 Encounters:   17 59 kg (130 lb)   17 61.2 kg (135 lb)   17 61.2 kg (135 lb)              We Performed the Following     Comprehensive Sleep " Study          Today's Medication Changes          These changes are accurate as of: 2/26/17 11:59 PM.  If you have any questions, ask your nurse or doctor.               These medicines have changed or have updated prescriptions.        Dose/Directions    atorvastatin 40 MG tablet   Commonly known as:  LIPITOR   This may have changed:  when to take this   Used for:  Hyperlipidemia LDL goal <100        Dose:  40 mg   Take 1 tablet (40 mg) by mouth daily   Quantity:  90 tablet   Refills:  3       clopidogrel 75 MG tablet   Commonly known as:  PLAVIX   This may have changed:  when to take this   Used for:  PAD (peripheral artery disease) (H), UGI bleed, Osteoporosis, Nausea        Dose:  75 mg   Take 1 tablet (75 mg) by mouth daily   Quantity:  90 tablet   Refills:  3       levETIRAcetam 500 MG tablet   Commonly known as:  KEPPRA   This may have changed:  when to take this   Used for:  Nausea        Dose:  500 mg   Take 1 tablet (500 mg) by mouth 2 times daily   Quantity:  180 tablet   Refills:  1       Phenytoin Sodium Extended 200 MG Caps   This may have changed:  additional instructions   Used for:  Seizure disorder (H)        Dose:  200 mg   Take 200 mg by mouth 2 times daily   Quantity:  60 capsule   Refills:  0                Primary Care Provider Office Phone # Fax #    Allan Casey [phone]       [address] [address]        Thank you!     Thank you for choosing [facility]  for your care. Our goal is always to provide you with excellent care. Hearing back from our patients is one way we can continue to improve our services. Please take a few minutes to complete the written survey that you may receive in the mail after your visit with us. Thank you!             Your Updated Medication List - Protect others around you: Learn how to safely use, store and throw away your medicines at www.disposemymeds.org.          This list is  accurate as of: 2/26/17 11:59 PM.  Always use your most recent med list.                   Brand Name Dispense Instructions for use    ADVAIR DISKUS 250-50 MCG/DOSE diskus inhaler   Generic drug:  fluticasone-salmeterol      Inhale 2 puffs into the lungs daily       albuterol (2.5 MG/3ML) 0.083% neb solution     360 mL    INHALE 1 VIAL VIA NEBULIZER EVERY 6 HOURS AS NEEDED       aspirin 81 MG EC tablet     90 tablet    Take 1 tablet (81 mg) by mouth daily       atorvastatin 40 MG tablet    LIPITOR    90 tablet    Take 1 tablet (40 mg) by mouth daily       blood glucose monitoring meter device kit     1 kit    Use to test blood sugars 3 times daily or as directed.       blood glucose monitoring test strip    ONE TOUCH ULTRA    3 Box    Use to test blood sugars 3 times daily or as directed.       calcium citrate-vitamin D 315-250 MG-UNIT Tabs per tablet    CITRACAL    120 tablet    Take 2 tablets by mouth daily       cetirizine 10 MG tablet    zyrTEC    90 tablet    Take 1 tablet (10 mg) by mouth every evening       ciprofloxacin 250 MG tablet    CIPRO    10 tablet    Take 1 tablet (250 mg) by mouth 2 times daily       clopidogrel 75 MG tablet    PLAVIX    90 tablet    Take 1 tablet (75 mg) by mouth daily       CYANOCOBALAMIN PO      Take 2,000 mcg by mouth daily       diazepam 5 MG tablet    VALIUM    20 tablet    Take 1 tablet (5 mg) by mouth every 6 hours as needed for anxiety       DOCUSATE SODIUM PO      Take 100 mg by mouth daily       dorzolamide 2 % ophthalmic solution    TRUSOPT     Place 1 drop into both eyes 2 times daily       * EASY TOUCH LANCETS 30G/TWIST Misc          * thin lancets    NO BRAND SPECIFIED    1 Box    Use to test blood sugar 3 times daily or as directed.       * blood glucose monitoring lancets     3 Box    Use to test blood sugar 3 times daily or as directed.       escitalopram 20 MG tablet    LEXAPRO    90 tablet    One per day       estradiol 1 MG tablet    ESTRACE    90 tablet    Take  1 tablet (1 mg) by mouth daily       famotidine 20 MG tablet    PEPCID    60 tablet    Take 1 tablet (20 mg) by mouth 2 times daily       ferrous sulfate 325 (65 FE) MG tablet    IRON    60 tablet    Take 1 tablet (325 mg) by mouth 2 times daily With meals       FLOMAX 0.4 MG capsule   Generic drug:  tamsulosin      Take 0.4 mg by mouth At Bedtime       fluticasone 50 MCG/ACT spray    FLONASE     Spray 1 spray into both nostrils daily       gabapentin 300 MG capsule    NEURONTIN    90 capsule    Take 1 capsule (300 mg) by mouth 3 times daily       hydrochlorothiazide 12.5 MG capsule    MICROZIDE    90 capsule    Take 1 capsule (12.5 mg) by mouth daily       HYDROmorphone 2 MG tablet    DILAUDID    30 tablet    Take 1 tablet (2 mg) by mouth every 3 hours as needed for moderate to severe pain       RENÉE Pack      Take 1 packet by mouth 2 times daily       lactulose 10 GM/15ML solution    CHRONULAC    946 mL    Take 30 mLs (20 g) by mouth daily as needed for constipation       latanoprost 0.005 % ophthalmic solution    XALATAN    2.5 mL    Place 1 drop into both eyes At Bedtime       levETIRAcetam 500 MG tablet    KEPPRA    180 tablet    Take 1 tablet (500 mg) by mouth 2 times daily       lidocaine 5 % Patch    LIDODERM    30 patch    Apply from 1 to 3 patches to painful area at once for up to 12 h within a 24 h period.  Remove after 12 hours.       lisinopril 2.5 MG tablet    PRINIVIL/Zestril    90 tablet    Take 1 tablet (2.5 mg) by mouth daily       MEDICATION GIVEN BY INTRATHECAL PUMP - INSTRUCTION      continuous 4/11/2016 per [facility] [place] [phone]: Conc: Bupivacaine 20 mg/mL and Fentanyl 2000 mcg/mL. Continuous: Bupivacaine 5.052 mg/day and Fentanyl 505.2 mcg/day. Boluses: Up to 7 boluses per 24-hr period of Bupivicaine 0.5992 mg and Fentanyl 59.9 mcg Max daily dose: Bupivacaine 9.1973 mg/day and Fentanyl 919.5883 mcg/day  Pump Last Fill Date:  6/30/2016 Pump Refill  Date: 9/20/2016       metoclopramide 5 MG tablet    REGLAN    240 tablet    Take 1 tablet (5 mg) by mouth 3 times daily (before meals)       metoprolol 25 MG 24 hr tablet    TOPROL-XL    30 tablet    Take 1 tablet (25 mg) by mouth daily       MULTIVITAMIN PO      Take 1 tablet by mouth daily       nitroglycerin 0.4 MG sublingual tablet    NITROSTAT    25 tablet    Place 1 tablet (0.4 mg) under the tongue every 5 minutes as needed for chest pain if you are still having symptoms after 3 doses (15 minutes) call 911.       ondansetron 4 MG ODT tab    ZOFRAN-ODT    120 tablet    Take 1 tablet (4 mg) by mouth every 6 hours       * order for DME     1 Month    Equipment being ordered: Depends briefs       * order for DME     1 Device    Equipment being ordered: Power Wheelchair       * order for DME     1 Units    Equipment being ordered: Nebulizer and supplies       * order for DME     1 Box    Equipment being ordered: Glucerna daily shakes with each meal       * order for DME     1 Device    ACcu check BID       * order for DME     1 Units    Equipment being ordered: Nebulizer and tubing supplies       * order for DME     1 Device    Equipment being ordered: CPAP supplies mask, hose, filters, cushion fax to [facility] at [phone] Disp: 10 DeviceRefills: prn Class: Local PrintStart: 2/10/2017       oxyCODONE-acetaminophen 5-325 MG per tablet    PERCOCET    60 tablet    Take 1-2 tablets every 3 hours as needed for pain.       pantoprazole 40 MG EC tablet    PROTONIX    60 tablet    Take 1 tablet (40 mg) by mouth 2 times daily Then QD       Phenytoin Sodium Extended 200 MG Caps     60 capsule    Take 200 mg by mouth 2 times daily       polyethylene glycol powder    MIRALAX/GLYCOLAX     Take 17 g by mouth daily       pregabalin 75 MG capsule    LYRICA    90 capsule    Take 1 capsule (75 mg) by mouth 2 times daily (takes at 8 AM and 8 PM)       prochlorperazine 10 MG tablet    COMPAZINE    20 tablet    Take 1 tablet  (10 mg) by mouth every 8 hours as needed       risperiDONE 1 MG tablet    risperDAL    90 tablet    Take 0.5 tablets (0.5 mg) by mouth At Bedtime       rOPINIRole 0.25 MG tablet    REQUIP    270 tablet    Take 3 tablets (0.75 mg) by mouth At Bedtime       SPIRIVA HANDIHALER 18 MCG capsule   Generic drug:  tiotropium     30 capsule    INHALE THE CONTENTS OF 1 CAPSULE VIA INHALATION DEVICE DAILY       * sucralfate 1 GM/10ML suspension    CARAFATE    1200 mL    Take 10 mLs (1 g) by mouth 4 times daily (before meals and nightly)       * sucralfate 1 GM tablet    CARAFATE    40 tablet    Take 1 tablet (1 g) by mouth 4 times daily May dissolve in 10 mL water is necessary. (Start upon completion of carafate suspension)       traZODone 100 MG tablet    DESYREL    90 tablet    Take 1 tablet (100 mg) by mouth At Bedtime       vitamin D 2000 UNITS tablet     100 tablet    Take 2,000 Units by mouth daily.       zinc 50 MG Tabs      Take 1 tablet by mouth daily       * Notice:  This list has 12 medication(s) that are the same as other medications prescribed for you. Read the directions carefully, and ask your doctor or other care provider to review them with you.

## 2017-02-27 NOTE — NURSING NOTE
Completed a all night titration PSG per provider order.    Preliminary AHI 45.  A final therapeutic PAP pressure was achieved.    Supine REM was seen on therapeutic pressure.    Patient reports feeling refreshed in AM.

## 2017-03-01 ENCOUNTER — CARE COORDINATION (OUTPATIENT)
Dept: GERIATRIC MEDICINE | Facility: CLINIC | Age: 67
End: 2017-03-01

## 2017-03-01 NOTE — PROGRESS NOTES
3/1/17: CM received email back from Luisana Orlando with [employer] stating she has received response back from pharmacy and they state to:  most can be overcome with overriding timely filling (claim too old) and refill too soon. I ve entered those overrides so you should direct the pharmacy to resubmit.   Please reach out to the member to contact the pharmacy (or you can as well) and request those outstanding claims be reprocessed     CM contacted member and informed her of this.  ELVIRA also reached out to Sandy Russell with [employer] at [employer] and relayed this information to her and to resubmit claims.       [employer] also states that member's lidocaine patches are not covered and many denied PA's so therefore will continue to deny.   Member states that PA's have been approved before and she has never had any issues with the patches being covered - she states they were just filled this week or last and states was told auth went through and no issues.   CM relayed this back to Luisana at [facility].     Jamie Sharma RN, PHN  [facility]

## 2017-03-06 ENCOUNTER — OFFICE VISIT (OUTPATIENT)
Dept: INTERNAL MEDICINE | Facility: CLINIC | Age: 67
End: 2017-03-06
Payer: COMMERCIAL

## 2017-03-06 ENCOUNTER — CARE COORDINATION (OUTPATIENT)
Dept: GERIATRIC MEDICINE | Facility: CLINIC | Age: 67
End: 2017-03-06

## 2017-03-06 VITALS
DIASTOLIC BLOOD PRESSURE: 68 MMHG | TEMPERATURE: 98.1 F | HEART RATE: 92 BPM | SYSTOLIC BLOOD PRESSURE: 126 MMHG | WEIGHT: 130 LBS | OXYGEN SATURATION: 93 % | BODY MASS INDEX: 20.36 KG/M2

## 2017-03-06 DIAGNOSIS — R32 URINARY INCONTINENCE, UNSPECIFIED TYPE: ICD-10-CM

## 2017-03-06 DIAGNOSIS — I70.229 CRITICAL LOWER LIMB ISCHEMIA (H): ICD-10-CM

## 2017-03-06 DIAGNOSIS — E11.51 TYPE 2 DIABETES MELLITUS WITH DIABETIC PERIPHERAL ANGIOPATHY WITHOUT GANGRENE, WITHOUT LONG-TERM CURRENT USE OF INSULIN (H): Primary | ICD-10-CM

## 2017-03-06 DIAGNOSIS — Z12.5 SPECIAL SCREENING FOR MALIGNANT NEOPLASM OF PROSTATE: ICD-10-CM

## 2017-03-06 LAB — PSA SERPL-ACNC: NORMAL UG/L

## 2017-03-06 PROCEDURE — G0103 PSA SCREENING: HCPCS | Performed by: INTERNAL MEDICINE

## 2017-03-06 PROCEDURE — 99214 OFFICE O/P EST MOD 30 MIN: CPT | Performed by: INTERNAL MEDICINE

## 2017-03-06 NOTE — PROGRESS NOTES
SUBJECTIVE:                                                    Sonya Foote is a 67 year old female who presents to clinic today for the following health issues:      URINARY TRACT SYMPTOMS      Duration: Since second leg amputation     Description  Incontinence- inability to control urine.    Intensity:  moderate    Accompanying signs and symptoms:  Fever/chills: no   Flank pain no   Nausea and vomiting: no   Vaginal symptoms: none  Abdominal/Pelvic Pain: no     History  History of frequent UTI's: no   History of kidney stones: no   Sexually Active: no   Possibility of pregnancy: No    Precipitating or alleviating factors: None    Therapies tried and outcome: Adult depends- going through about 5 daily.    Requesting Referral for urology to discuss getting a catheter.        Problem list and histories reviewed & adjusted, as indicated.  Additional history: as documented    Patient Active Problem List   Diagnosis     Hyperlipidemia LDL goal <100     Seizure disorder (H)     ACP (advance care planning)     Osteoporosis     Schizoaffective disorder (H)     AS (sickle cell trait) (H)     Vertigo     Person who has had sex change operation     Claudication in peripheral vascular disease (H)     Intestinal malabsorption     GIB (gastrointestinal bleeding)     Cervicalgia     Health Care Home     Asthma     Adjustment disorder with depressed mood     Chronic pain syndrome     Open-angle glaucoma     History of colonic polyps     Old myocardial infarction     Iron deficiency anemia     Late effects of cerebrovascular disease     Degeneration of intervertebral disc of lumbosacral region     Thoracic or lumbosacral neuritis or radiculitis     Cerebral infarction due to occlusion or stenosis of carotid artery     Disorder of bone and cartilage     Hereditary and idiopathic peripheral neuropathy     Androgen insensitivity syndrome     PAD (peripheral artery disease) (H)     Restless legs syndrome (RLS)     Chronic systolic  congestive heart failure (H)     Carotid stenosis, left     Primary osteoarthritis of both hands     Pain in both upper extremities     Atherosclerotic peripheral vascular disease with rest pain (H)     Essential hypertension with goal blood pressure less than 130/80     Cellulitis of right ankle     Angina pectoris, crescendo (H)     Type 2 diabetes mellitus with diabetic peripheral angiopathy without gangrene, without long-term current use of insulin (H)     Anemia, unspecified type     Critical lower limb ischemia     Testicular feminization     Anxiety disorder due to general medical condition     Anxiety disorder     Central retinal artery occlusion     Lumbosacral radiculitis     Peripheral nerve disease (H)     Osteopenia     Prediabetes     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 (H)     Primary open angle glaucoma of both eyes, severe stage     Pseudophakia of right eye     Cataract, left eye     Diabetes mellitus type 2 without retinopathy (H)     Pyelonephritis     Gastroesophageal reflux disease without esophagitis     Chronic obstructive pulmonary disease, unspecified COPD type (H)     JOSE (obstructive sleep apnea)     Past Surgical History   Procedure Laterality Date     Appendectomy       Tonsillectomy       Cholecystectomy       Orthopedic surgery       broken wrist repair     Breast surgery       right breast bx (benign)     Sinus surgery       cyst removed     Esophagoscopy, gastroscopy, duodenoscopy (egd), combined  12/14/2012     Procedure: COMBINED ESOPHAGOSCOPY, GASTROSCOPY, DUODENOSCOPY (EGD), BIOPSY SINGLE OR MULTIPLE;  ESOPHAGOSCOPY, GASTROSCOPY, DUODENOSCOPY (EGD), DILATATION ;  Surgeon: Elizabeth Stevenson MD;  Location:  GI     Vascular surgery       Left carotid stent     Esophagoscopy, gastroscopy, duodenoscopy (egd), combined  12/31/2013     Procedure: COMBINED ESOPHAGOSCOPY, GASTROSCOPY, DUODENOSCOPY (EGD), BIOPSY SINGLE OR MULTIPLE;;  Surgeon: Clemente Lopez MD;   Location: [facility]     Sex transformation surgery, male to female       1974     Esophagoscopy, gastroscopy, duodenoscopy (egd), combined  4/1/2014     Procedure: COMBINED ESOPHAGOSCOPY, GASTROSCOPY, DUODENOSCOPY (EGD);;  Surgeon: Clemente Lopez MD;  Location: [facility]     Endarterectomy femoral  5/23/2014     Procedure: ENDARTERECTOMY FEMORAL;  Surgeon: Jason Joshi MD;  [facility]     Esophagoscopy, gastroscopy, duodenoscopy (egd), combined  6/28/2014     Procedure: COMBINED ESOPHAGOSCOPY, GASTROSCOPY, DUODENOSCOPY (EGD);  Surgeon: Clemente Lopez MD;  Location: [facility]     Colonoscopy with co2 insufflation N/A 8/20/2014     Procedure: COLONOSCOPY WITH CO2 INSUFFLATION;  Surgeon: Duane, William Charles, MD;  Location: [facility]     Esophagoscopy, gastroscopy, duodenoscopy (egd), combined N/A 8/20/2014     Procedure: COMBINED ESOPHAGOSCOPY, GASTROSCOPY, DUODENOSCOPY (EGD), BIOPSY SINGLE OR MULTIPLE;  Surgeon: Duane, William Charles, MD;  Location: [facility]     Esophagoscopy, gastroscopy, duodenoscopy (egd), combined N/A 8/22/2014     Procedure: COMBINED ESOPHAGOSCOPY, GASTROSCOPY, DUODENOSCOPY (EGD), BIOPSY SINGLE OR MULTIPLE;  Surgeon: Mello Ferrer MD;  Location: [facility]     Hc capsule endoscopy N/A 8/25/2014     Procedure: CAPSULE/PILL CAM ENDOSCOPY;  Surgeon: Remy Haskins MD;  Location: [facility]     Colonoscopy N/A 8/25/2014     Procedure: COLONOSCOPY;  Surgeon: Mello Ferrer MD;  [facility]: [facility]     Hc capsule endoscopy N/A 10/2/2014     Procedure: CAPSULE/PILL CAM ENDOSCOPY;  Surgeon: Remy Haskins MD;  [facility]: [facility]     Esophagoscopy, gastroscopy, duodenoscopy (egd), combined N/A 10/2/2014     Procedure: COMBINED ESOPHAGOSCOPY, GASTROSCOPY, DUODENOSCOPY (EGD), BIOPSY SINGLE OR MULTIPLE;  Surgeon: Remy Haskins MD;  [facility]: [facility]     Angiogram Bilateral 11/21/2014     Procedure: ANGIOGRAM;  Surgeon: Savannah Durant MD;  [facility]     Esophagoscopy, gastroscopy, duodenoscopy  (egd), combined Left 12/15/2014     Procedure: COMBINED ESOPHAGOSCOPY, GASTROSCOPY, DUODENOSCOPY (EGD), BIOPSY SINGLE OR MULTIPLE;  Surgeon: Remy Haskins MD;  [facility]     Angiogram Left 1/16/2015     Procedure: ANGIOGRAM;  Surgeon: Savannah Durant MD;  [facility]     Esophagoscopy, gastroscopy, duodenoscopy (egd), combined N/A 2/25/2015     Procedure: COMBINED ENDOSCOPIC ULTRASOUND, ESOPHAGOSCOPY, GASTROSCOPY, DUODENOSCOPY (EGD), FINE NEEDLE ASPIRATE/BIOPSY;  Surgeon: Clemnete Lugo MD;  Location: [facility]     Esophagoscopy, gastroscopy, duodenoscopy (egd), combined Left 2/25/2015     Procedure: COMBINED ESOPHAGOSCOPY, GASTROSCOPY, DUODENOSCOPY (EGD), BIOPSY SINGLE OR MULTIPLE;  Surgeon: Clemente Lugo MD;  [facility]     Angiogram Bilateral 9/14/2015     Procedure: ANGIOGRAM;  Surgeon: Savannah Durant MD;  [facility]     Angiogram Left 10/12/2015     Procedure: ANGIOGRAM;  Surgeon: Savannah Durant MD;  [facility]     Amputate toe(s) Right 1/5/2016     Procedure: AMPUTATE TOE(S);  Surgeon: Mello Gaines DPM;  Location: [facility]     Amputate leg above knee Left 6/11/2016     Procedure: AMPUTATE LEG ABOVE KNEE;  Surgeon: Mello Rodriguez MD;  [facility]     Fasciotomy lower extremity Left 6/10/2016     Procedure: FASCIOTOMY LOWER EXTREMITY;  Surgeon: Mello Rodriguez MD;  [facility]     Angiogram Right 6/6/2016     Procedure: ANGIOGRAM;  Surgeon: Savannah Durant MD;  [facility]     Angioplasty Right 6/6/2016     Procedure: ANGIOPLASTY;  Surgeon: Savannah Durant MD;  [facility]     Esophagoscopy, gastroscopy, duodenoscopy (egd), combined N/A 9/25/2016     Procedure: COMBINED ESOPHAGOSCOPY, GASTROSCOPY, DUODENOSCOPY (EGD);  Surgeon: Aziza Patiño MD;  [facility]     Amputate leg below knee Right 11/7/2016     Procedure: AMPUTATE LEG BELOW KNEE;  Surgeon: Savannah Durant MD;  [facility]     Amputate revision stump lower extremity Right 11/11/2016      Procedure: AMPUTATE REVISION STUMP LOWER EXTREMITY;  Surgeon: Savannah Durant MD;  [facility]     Amputate revision stump lower extremity Right 11/16/2016     Procedure: AMPUTATE REVISION STUMP LOWER EXTREMITY;  Surgeon: Savannah Durant MD;  Location:  OR     Esophagoscopy, gastroscopy, duodenoscopy (egd), combined N/A 1/18/2017     Procedure: COMBINED ESOPHAGOSCOPY, GASTROSCOPY, DUODENOSCOPY (EGD), BIOPSY SINGLE OR MULTIPLE;  Surgeon: Clemente Lopez MD;  Location: [facility]       Social History   Substance Use Topics     Smoking status: Former Smoker     Packs/day: 2.50     Years: 30.00     Types: Cigarettes, Cigars     Quit date: 11/1/2000     Smokeless tobacco: Never Used     Alcohol use No     Family History   Problem Relation Age of Onset     Dementia Mother      Glaucoma Mother      DIABETES Mother      Coronary Artery Disease Mother      MI     Glaucoma Father      DIABETES Father      Heart Failure Father      CANCER Maternal Aunt      leukemia     Schizophrenia Brother      Depression Brother      Suicide Sister      Depression Sister      DIABETES Sister      CANCER Maternal Aunt      ovarian     Glaucoma Maternal Grandmother      DIABETES Maternal Grandmother      Glaucoma Maternal Grandfather      DIABETES Maternal Grandfather      Glaucoma Paternal Grandmother      DIABETES Paternal Grandmother      Glaucoma Paternal Grandfather      DIABETES Paternal Grandfather      Breast Cancer Sister      CEREBROVASCULAR DISEASE Brother          Current Outpatient Prescriptions   Medication Sig Dispense Refill     order for DME Equipment being ordered: CPAP supplies mask, hose, filters, cushion    fax to [facility] at [phone] Device prn     famotidine (PEPCID) 20 MG tablet Take 1 tablet (20 mg) by mouth 2 times daily 60 tablet 11     sucralfate (CARAFATE) 1 GM tablet Take 1 tablet (1 g) by mouth 4 times daily May dissolve in 10 mL water is necessary. (Start upon completion of carafate suspension) 40  tablet 5     order for DME Equipment being ordered: CPAP supplies mask, hose, filters, cushion fax to [facility] at [phone]  Disp: 10 Device Refills: prn   Class: Local Print Start: 2/10/2017 1 Device 0     ciprofloxacin (CIPRO) 250 MG tablet Take 1 tablet (250 mg) by mouth 2 times daily 10 tablet 0     pantoprazole (PROTONIX) 40 MG EC tablet Take 1 tablet (40 mg) by mouth 2 times daily Then QD 60 tablet 5     order for DME Equipment being ordered: Nebulizer and tubing supplies 1 Units 0     lidocaine (LIDODERM) 5 % Patch Apply from 1 to 3 patches to painful area at once for up to 12 h within a 24 h period.  Remove after 12 hours. 30 patch 1     oxyCODONE-acetaminophen (PERCOCET) 5-325 MG per tablet Take 1-2 tablets every 3 hours as needed for pain. 60 tablet 0     albuterol (2.5 MG/3ML) 0.083% neb solution INHALE 1 VIAL VIA NEBULIZER EVERY 6 HOURS AS NEEDED 360 mL 11     sucralfate (CARAFATE) 1 GM/10ML suspension Take 10 mLs (1 g) by mouth 4 times daily (before meals and nightly) 1200 mL 2     order for DME [facility] check BID 1 Device 0     ondansetron (ZOFRAN-ODT) 4 MG ODT tab Take 1 tablet (4 mg) by mouth every 6 hours 120 tablet 0     metoprolol (TOPROL-XL) 25 MG 24 hr tablet Take 1 tablet (25 mg) by mouth daily 30 tablet 0     metoclopramide (REGLAN) 5 MG tablet Take 1 tablet (5 mg) by mouth 3 times daily (before meals) 240 tablet 0     CYANOCOBALAMIN PO Take 2,000 mcg by mouth daily       gabapentin (NEURONTIN) 300 MG capsule Take 1 capsule (300 mg) by mouth 3 times daily 90 capsule 3     Nutritional Supplements (RENÉE) PACK Take 1 packet by mouth 2 times daily       polyethylene glycol (MIRALAX/GLYCOLAX) powder Take 17 g by mouth daily       HYDROmorphone (DILAUDID) 2 MG tablet Take 1 tablet (2 mg) by mouth every 3 hours as needed for moderate to severe pain 30 tablet 0     fluticasone (FLONASE) 50 MCG/ACT nasal spray Spray 1 spray into both nostrils daily       ADVAIR DISKUS 250-50 MCG/DOSE diskus  inhaler Inhale 2 puffs into the lungs daily       calcium citrate-vitamin D (CITRACAL) 315-250 MG-UNIT TABS Take 2 tablets by mouth daily 120 tablet 5     pregabalin (LYRICA) 75 MG capsule Take 1 capsule (75 mg) by mouth 2 times daily (takes at 8 AM and 8 PM) 90 capsule 3     ferrous sulfate (IRON) 325 (65 FE) MG tablet Take 1 tablet (325 mg) by mouth 2 times daily With meals 60 tablet 2     hydrochlorothiazide (MICROZIDE) 12.5 MG capsule Take 1 capsule (12.5 mg) by mouth daily 90 capsule 3     lisinopril (PRINIVIL,ZESTRIL) 2.5 MG tablet Take 1 tablet (2.5 mg) by mouth daily 90 tablet 3     escitalopram (LEXAPRO) 20 MG tablet One per day 90 tablet 3     estradiol (ESTRACE) 1 MG tablet Take 1 tablet (1 mg) by mouth daily 90 tablet 3     levETIRAcetam (KEPPRA) 500 MG tablet Take 1 tablet (500 mg) by mouth 2 times daily (Patient taking differently: Take 500 mg by mouth 3 times daily ) 180 tablet 1     traZODone (DESYREL) 100 MG tablet Take 1 tablet (100 mg) by mouth At Bedtime 90 tablet 3     rOPINIRole (REQUIP) 0.25 MG tablet Take 3 tablets (0.75 mg) by mouth At Bedtime 270 tablet 1     aspirin EC 81 MG EC tablet Take 1 tablet (81 mg) by mouth daily 90 tablet 3     clopidogrel (PLAVIX) 75 MG tablet Take 1 tablet (75 mg) by mouth daily (Patient taking differently: Take 75 mg by mouth At Bedtime ) 90 tablet 3     risperiDONE (RISPERDAL) 1 MG tablet Take 0.5 tablets (0.5 mg) by mouth At Bedtime 90 tablet 0     blood glucose monitoring (ONE TOUCH ULTRA 2) meter device kit Use to test blood sugars 3 times daily or as directed. 1 kit 0     blood glucose monitoring (ONE TOUCH ULTRA) test strip Use to test blood sugars 3 times daily or as directed. 3 Box 3     blood glucose monitoring (ONE TOUCH ULTRASOFT) lancets Use to test blood sugar 3 times daily or as directed. 3 Box 3     phenytoin 200 MG CAPS Take 200 mg by mouth 2 times daily (Patient taking differently: Take 200 mg by mouth 2 times daily (takes at 8 AM and 8 PM)) 60  capsule 0     DOCUSATE SODIUM PO Take 100 mg by mouth daily       tamsulosin (FLOMAX) 0.4 MG 24 hr capsule Take 0.4 mg by mouth At Bedtime       dorzolamide (TRUSOPT) 2 % ophthalmic solution Place 1 drop into both eyes 2 times daily        diazepam (VALIUM) 5 MG tablet Take 1 tablet (5 mg) by mouth every 6 hours as needed for anxiety 20 tablet 1     SPIRIVA HANDIHALER 18 MCG inhalation capsule INHALE THE CONTENTS OF 1 CAPSULE VIA INHALATION DEVICE DAILY 30 capsule 2     zinc 50 MG TABS Take 1 tablet by mouth daily       cetirizine (ZYRTEC) 10 MG tablet Take 1 tablet (10 mg) by mouth every evening 90 tablet 3     nitroglycerin (NITROSTAT) 0.4 MG SL tablet Place 1 tablet (0.4 mg) under the tongue every 5 minutes as needed for chest pain if you are still having symptoms after 3 doses (15 minutes) call 911. (Patient not taking: Reported on 2/22/2017) 25 tablet 1     MEDICATION GIVEN BY INTRATHECAL PUMP - INSTRUCTION continuous 4/11/2016 per [facility] in [place] [phone]:  Conc: Bupivacaine 20 mg/mL and Fentanyl 2000 mcg/mL.  Continuous: Bupivacaine 5.052 mg/day and Fentanyl 505.2 mcg/day.  Boluses: Up to 7 boluses per 24-hr period of Bupivicaine 0.5992 mg and Fentanyl 59.9 mcg  Max daily dose: Bupivacaine 9.1973 mg/day and Fentanyl 919.5883 mcg/day    Pump Last Fill Date:  6/30/2016  Pump Refill Date: 9/20/2016       latanoprost (XALATAN) 0.005 % ophthalmic solution Place 1 drop into both eyes At Bedtime 2.5 mL 11     atorvastatin (LIPITOR) 40 MG tablet Take 1 tablet (40 mg) by mouth daily (Patient taking differently: Take 40 mg by mouth At Bedtime ) 90 tablet 3     lactulose (CHRONULAC) 10 GM/15ML solution Take 30 mLs (20 g) by mouth daily as needed for constipation 946 mL 11     order for DME Equipment being ordered: Glucerna daily shakes with each meal 1 Box 11     prochlorperazine (COMPAZINE) 10 MG tablet Take 1 tablet (10 mg) by mouth every 8 hours as needed 20 tablet 0     thin  (NO BRAND SPECIFIED) lancets Use to test blood sugar 3 times daily or as directed. 1 Box 10     ORDER FOR DME Equipment being ordered: Nebulizer and supplies 1 Units 0     ORDER FOR DME Equipment being ordered: Power Wheelchair 1 Device 0     ORDER FOR DME Equipment being ordered: Depends briefs 1 Month 11     EASY TOUCH LANCETS 30G/TWIST MISC        Cholecalciferol (VITAMIN D) 2000 UNITS tablet Take 2,000 Units by mouth daily. 100 tablet 3     Multiple Vitamin (MULTIVITAMIN OR) Take 1 tablet by mouth daily        BP Readings from Last 3 Encounters:   03/06/17 126/68   02/22/17 113/62   02/06/17 138/78    Wt Readings from Last 3 Encounters:   03/06/17 130 lb (59 kg)   02/22/17 130 lb (59 kg)   02/06/17 135 lb (61.2 kg)                  Labs reviewed in EPIC    Reviewed and updated as needed this visit by clinical staff  Tobacco  Allergies  Med Hx  Surg Hx  Fam Hx  Soc Hx      Reviewed and updated as needed this visit by Provider         ROS:  C: NEGATIVE for fever, chills, change in weight  E/M: NEGATIVE for ear, mouth and throat problems  R: NEGATIVE for significant cough or SOB  CV: NEGATIVE for chest pain, palpitations or peripheral edema  GI: NEGATIVE for nausea, abdominal pain, heartburn, or change in bowel habits  H: NEGATIVE for bleeding problems  P: NEGATIVE for changes in mood or affect    OBJECTIVE:                                                    /68 (BP Location: Left arm, Patient Position: Chair, Cuff Size: Adult Regular)  Pulse 92  Temp 98.1  F (36.7  C) (Oral)  Wt 130 lb (59 kg)  SpO2 93%  BMI 20.36 kg/m2  Body mass index is 20.36 kg/(m^2).  GENERAL: healthy, alert and no distress  EYES: Eyes grossly normal to inspection, extraocular movements - intact, and PERRL  HENT: ear canals- normal; TMs- normal; Nose- normal; Mouth- no ulcers, no lesions  NECK: no tenderness, no adenopathy, no asymmetry, no masses, no stiffness; thyroid- normal to palpation  RESP: lungs clear to auscultation - no  rales, no rhonchi, no wheezes  CV: regular rates and rhythm, normal S1 S2, no S3 or S4 and no click or rub   MS: absent LE's, AKA's  PSYCH: Alert and oriented times 3; speech- coherent , normal rate and volume; able to articulate logical thoughts, able to abstract reason, no tangential thoughts, no hallucinations or delusions, affect- normal         ASSESSMENT/PLAN:                                                    1. (E11.51) Type 2 diabetes mellitus with diabetic peripheral angiopathy without gangrene, without long-term current use of insulin (H)  Lab Results   Component Value Date    A1C 4.1 01/24/2017    A1C 4.9 06/06/2016    A1C 4.8 08/11/2015    A1C 4.7 03/06/2015    A1C 4.8 03/02/2015       - blood glucose monitoring (ONE TOUCH ULTRA) test strip; Use to test blood sugars 3 times daily or as directed.  Dispense: 3 Box; Refill: 3    2. (R32) Urinary incontinence, unspecified type  (primary encounter diagnosis)  Comment: referral sent for further testing  Plan: UROLOGY ADULT REFERRAL            3. (Z12.5) Special screening for malignant neoplasm of prostate  Comment: ordered as screening  Plan: PSA, screen            4. (I99.8) Critical lower limb ischemia  Comment: seat cushion DME done  Plan: order for DME      See Patient Instructions    Allan Casey MD  [facility]    25 minutes spent with this patient, face to face, discussing treatment options for listed problems above as well as side effects of appropriate medications.  Counseling time extended beyond 50% of the clinic visit.  Medication dosing, treatment plan and follow-up were discussed. Also reviewed need for primary care testing for patient.

## 2017-03-06 NOTE — NURSING NOTE
"Chief Complaint   Patient presents with     Urinary Problem       Initial /68 (BP Location: Left arm, Patient Position: Chair, Cuff Size: Adult Regular)  Pulse 92  Temp 98.1  F (36.7  C) (Oral)  Wt 130 lb (59 kg)  SpO2 93%  BMI 20.36 kg/m2 Estimated body mass index is 20.36 kg/(m^2) as calculated from the following:    Height as of 1/9/17: 5' 7\" (1.702 m).    Weight as of this encounter: 130 lb (59 kg).  Medication Reconciliation: complete   Daniela Hancock CMA      "

## 2017-03-06 NOTE — MR AVS SNAPSHOT
After Visit Summary   3/6/2017    Sonya Foote    MRN: [id number]           Patient Information     Date Of Birth          1950        Visit Information        Provider Department      3/6/2017 10:00 AM Allan Casey MD [facility]        Today's Diagnoses     Urinary incontinence, unspecified type    -  1    Special screening for malignant neoplasm of prostate        Critical lower limb ischemia        Type 2 diabetes mellitus with diabetic peripheral angiopathy without gangrene, without long-term current use of insulin (H)           Follow-ups after your visit        Additional Services     UROLOGY ADULT REFERRAL       Your provider has referred you to: REBE[facility]: [facility] [phone]   [email or web address]    Please be aware that coverage of these services is subject to the terms and limitations of your health insurance plan.  Call member services at your health plan with any benefit or coverage questions.      Please bring the following with you to your appointment:    (1) Any X-Rays, CTs or MRIs which have been performed.  Contact the facility where they were done to arrange for  prior to your scheduled appointment.    (2) List of current medications  (3) This referral request   (4) Any documents/labs given to you for this referral                  Your next 10 appointments already scheduled     Mar 13, 2017  9:00 AM CDT   US CAROTID BILATERAL with [email or web address]   [facility] ([facility])    [address] [place] [address] [phone]           Please bring a list of your medicines (including vitamins, minerals and over-the-counter drugs). Also, tell your doctor about any allergies you may have. Wear comfortable clothes and leave your valuables at home.  You do not need to do anything special to prepare for your exam.  Please call the Imaging Department at your  exam site with any questions.            Mar 15, 2017  2:30 PM CDT   Return Sleep Patient with Guanaco Kowalski MD   [facility] [place]    [address] [place] [address]   [phone]            Mar 30, 2017 11:15 AM CDT   (Arrive by 11:00 AM)   New Patient Visit with Ky Ramírez MD   [facility] ([facility])    [address] [place] [address]  [address] [facility] [address]   [phone]            May 12, 2017 10:50 AM CDT   (Arrive by 10:35 AM)   RETURN DIABETES with Michelle Irizarry MD   [facility] Endocrinology ([facility])    [address] [place] [address]  3rd [facility] [address]   [phone]            Jul 10, 2017 11:00 AM CDT   (Arrive by 10:45 AM)   Return Visit with Alina Jennings MD   [facility] ([facility])    [address] [place] [address]   [phone]              Who to contact     If you have questions or need follow up information about today's clinic visit or your schedule please contact [facility] directly at [phone].  Normal or non-critical lab and imaging results will be communicated to you by [employer], letter or phone within 4 business days after the clinic has received the results. If you do not hear from us within 7 days, please contact the clinic through [employer] or phone. If you have a critical or abnormal lab result, we will notify you by phone as soon as possible.  Submit refill requests through [email or web address] or call your pharmacy and they will forward the refill request to us. Please allow 3 business days for your refill to be completed.          Additional Information About Your Visit        [email or web address] Information     [email or web address] lets you send messages to your doctor, view your test results, renew your  "prescriptions, schedule appointments and more. To sign up, go to [email or web address] . Click on \"Log in\" on the left side of the screen, which will take you to the Welcome page. Then click on \"Sign up Now\" on the right side of the page.     You will be asked to enter the access code listed below, as well as some personal information. Please follow the directions to create your username and password.     Your access code is: [email or web address]  Expires: 3/26/2017  6:30 AM     Your access code will  in 90 days. If you need help or a new code, please call your [facility] clinic or [phone].        Care EveryWhere ID     This is your Care EveryWhere ID. This could be used by other organizations to access your [facility] medical records  [id number]        Your Vitals Were     Pulse Temperature Pulse Oximetry BMI (Body Mass Index)          92 98.1  F (36.7  C) (Oral) 93% 20.36 kg/m2         Blood Pressure from Last 3 Encounters:   17 126/68   17 113/62   17 138/78    Weight from Last 3 Encounters:   17 130 lb (59 kg)   17 130 lb (59 kg)   17 135 lb (61.2 kg)              We Performed the Following     PSA, screen     UROLOGY ADULT REFERRAL          Today's Medication Changes          These changes are accurate as of: 3/6/17 10:35 AM.  If you have any questions, ask your nurse or doctor.               These medicines have changed or have updated prescriptions.        Dose/Directions    atorvastatin 40 MG tablet   Commonly known as:  LIPITOR   This may have changed:  when to take this   Used for:  Hyperlipidemia LDL goal <100        Dose:  40 mg   Take 1 tablet (40 mg) by mouth daily   Quantity:  90 tablet   Refills:  3       clopidogrel 75 MG tablet   Commonly known as:  PLAVIX   This may have changed:  when to take this   Used for:  PAD (peripheral artery disease) (H), UGI bleed, Osteoporosis, Nausea        Dose:  75 mg   Take 1 tablet (75 mg) by mouth daily   Quantity:  90 tablet "   Refills:  3       levETIRAcetam 500 MG tablet   Commonly known as:  KEPPRA   This may have changed:  when to take this   Used for:  Nausea        Dose:  500 mg   Take 1 tablet (500 mg) by mouth 2 times daily   Quantity:  180 tablet   Refills:  1       * order for DME   This may have changed:  Another medication with the same name was added. Make sure you understand how and when to take each.   Used for:  Incontinence   Changed by:  Allan Casey MD        Equipment being ordered: Depends briefs   Quantity:  1 Month   Refills:  11       * order for DME   This may have changed:  Another medication with the same name was added. Make sure you understand how and when to take each.   Used for:  CVA (cerebral infarction), HTN (hypertension)   Changed by:  Allan Casey MD        Equipment being ordered: Power Wheelchair   Quantity:  1 Device   Refills:  0       * order for DME   This may have changed:  Another medication with the same name was added. Make sure you understand how and when to take each.   Used for:  Obstructive chronic bronchitis with exacerbation (H)   Changed by:  Servando Martinez RN        Equipment being ordered: Nebulizer and supplies   Quantity:  1 Units   Refills:  0       * order for DME   This may have changed:  Another medication with the same name was added. Make sure you understand how and when to take each.   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 Make sure you understand how and when to take each.   Used for:  Type 2 diabetes mellitus with diabetic peripheral angiopathy without gangrene, without long-term current use of insulin (H)   Changed by:  Allan Casey MD        ACcu check BID   Quantity:  1 Device    Refills:  0       * order for DME   This may have changed:  Another medication with the same name was added. Make sure you understand how and when to take each.   Used for:  Simple chronic bronchitis (H)   Changed by:  Alina Jennings MD        Equipment being ordered: Nebulizer and tubing supplies   Quantity:  1 Units   Refills:  0       * order for DME   This may have changed:  Another medication with the same name was added. Make sure you understand how and when to take each.   Used for:  Chronic obstructive pulmonary disease, unspecified COPD type (H)   Changed by:  Allan Casey MD        Equipment being ordered: CPAP supplies mask, hose, filters, cushion fax to [facility] at [phone] Disp: 10 DeviceRefills: prn Class: Local PrintStart: 2/10/2017   Quantity:  1 Device   Refills:  0       * order for DME   This may have changed:  Another medication with the same name was added. Make sure you understand how and when to take each.   Used for:  COPD (chronic obstructive pulmonary disease) (H)   Changed by:  Jamie Sharma RN        Equipment being ordered: CPAP supplies mask, hose, filters, cushion  fax to [facility] at [phone]   Quantity:  10 Device   Refills:  prn       * order for DME   This may have changed:  You were already taking a medication with the same name, and this prescription was added. Make sure you understand how and when to take each.   Used for:  Critical lower limb ischemia   Changed by:  Allan Casey MD        Equipment being ordered: wheelchair seat cushion   Quantity:  1 Device   Refills:  0       Phenytoin Sodium Extended 200 MG Caps   This may have changed:  additional instructions   Used for:  Seizure disorder (H)        Dose:  200 mg   Take 200 mg by mouth 2 times daily   Quantity:  60 capsule   Refills:  0       * Notice:  This list has 9 medication(s) that are the same as other medications prescribed for you. Read the directions carefully, and  ask your doctor or other care provider to review them with you.         Where to get your medicines      These medications were sent to [id number] - [address] [place] [facility] [address] AT [place] [address] [place] [address]     Phone:  [phone]     blood glucose monitoring test strip         Some of these will need a paper prescription and others can be bought over the counter.  Ask your nurse if you have questions.     Bring a paper prescription for each of these medications     order for DME                Primary Care Provider Office Phone # Fax #    Allan Casey [phone]       [facility] [address]  [facility] [address]        Thank you!     Thank you for choosing [facility]  for your care. Our goal is always to provide you with excellent care. Hearing back from our patients is one way we can continue to improve our services. Please take a few minutes to complete the written survey that you may receive in the mail after your visit with us. Thank you!             Your Updated Medication List - Protect others around you: Learn how to safely use, store and throw away your medicines at www.disposemymeds.org.          This list is accurate as of: 3/6/17 10:35 AM.  Always use your most recent med list.                   Brand Name Dispense Instructions for use    ADVAIR DISKUS 250-50 MCG/DOSE diskus inhaler   Generic drug:  fluticasone-salmeterol      Inhale 2 puffs into the lungs daily       albuterol (2.5 MG/3ML) 0.083% neb solution     360 mL    INHALE 1 VIAL VIA NEBULIZER EVERY 6 HOURS AS NEEDED       aspirin 81 MG EC tablet     90 tablet    Take 1 tablet (81 mg) by mouth daily       atorvastatin 40 MG tablet    LIPITOR    90 tablet    Take 1 tablet (40 mg) by mouth daily       blood glucose monitoring meter device kit     1 kit    Use to test blood sugars 3 times daily or as directed.       blood  glucose monitoring test strip    ONE TOUCH ULTRA    3 Box    Use to test blood sugars 3 times daily or as directed.       calcium citrate-vitamin D 315-250 MG-UNIT Tabs per tablet    CITRACAL    120 tablet    Take 2 tablets by mouth daily       cetirizine 10 MG tablet    zyrTEC    90 tablet    Take 1 tablet (10 mg) by mouth every evening       ciprofloxacin 250 MG tablet    CIPRO    10 tablet    Take 1 tablet (250 mg) by mouth 2 times daily       clopidogrel 75 MG tablet    PLAVIX    90 tablet    Take 1 tablet (75 mg) by mouth daily       CYANOCOBALAMIN PO      Take 2,000 mcg by mouth daily       diazepam 5 MG tablet    VALIUM    20 tablet    Take 1 tablet (5 mg) by mouth every 6 hours as needed for anxiety       DOCUSATE SODIUM PO      Take 100 mg by mouth daily       dorzolamide 2 % ophthalmic solution    TRUSOPT     Place 1 drop into both eyes 2 times daily       * EASY TOUCH LANCETS 30G/TWIST Misc          * thin lancets    NO BRAND SPECIFIED    1 Box    Use to test blood sugar 3 times daily or as directed.       * blood glucose monitoring lancets     3 Box    Use to test blood sugar 3 times daily or as directed.       escitalopram 20 MG tablet    LEXAPRO    90 tablet    One per day       estradiol 1 MG tablet    ESTRACE    90 tablet    Take 1 tablet (1 mg) by mouth daily       famotidine 20 MG tablet    PEPCID    60 tablet    Take 1 tablet (20 mg) by mouth 2 times daily       ferrous sulfate 325 (65 FE) MG tablet    IRON    60 tablet    Take 1 tablet (325 mg) by mouth 2 times daily With meals       FLOMAX 0.4 MG capsule   Generic drug:  tamsulosin      Take 0.4 mg by mouth At Bedtime       fluticasone 50 MCG/ACT spray    FLONASE     Spray 1 spray into both nostrils daily       gabapentin 300 MG capsule    NEURONTIN    90 capsule    Take 1 capsule (300 mg) by mouth 3 times daily       hydrochlorothiazide 12.5 MG capsule    MICROZIDE    90 capsule    Take 1 capsule (12.5 mg) by mouth daily       HYDROmorphone 2  MG tablet    DILAUDID    30 tablet    Take 1 tablet (2 mg) by mouth every 3 hours as needed for moderate to severe pain       RENÉE Pack      Take 1 packet by mouth 2 times daily       lactulose 10 GM/15ML solution    CHRONULAC    946 mL    Take 30 mLs (20 g) by mouth daily as needed for constipation       latanoprost 0.005 % ophthalmic solution    XALATAN    2.5 mL    Place 1 drop into both eyes At Bedtime       levETIRAcetam 500 MG tablet    KEPPRA    180 tablet    Take 1 tablet (500 mg) by mouth 2 times daily       lidocaine 5 % Patch    LIDODERM    30 patch    Apply from 1 to 3 patches to painful area at once for up to 12 h within a 24 h period.  Remove after 12 hours.       lisinopril 2.5 MG tablet    PRINIVIL/Zestril    90 tablet    Take 1 tablet (2.5 mg) by mouth daily       MEDICATION GIVEN BY INTRATHECAL PUMP - INSTRUCTION      continuous 4/11/2016 per [facility] [place] [phone]: Conc: Bupivacaine 20 mg/mL and Fentanyl 2000 mcg/mL. Continuous: Bupivacaine 5.052 mg/day and Fentanyl 505.2 mcg/day. Boluses: Up to 7 boluses per 24-hr period of Bupivicaine 0.5992 mg and Fentanyl 59.9 mcg Max daily dose: Bupivacaine 9.1973 mg/day and Fentanyl 919.5883 mcg/day  Pump Last Fill Date:  6/30/2016 Pump Refill Date: 9/20/2016       metoclopramide 5 MG tablet    REGLAN    240 tablet    Take 1 tablet (5 mg) by mouth 3 times daily (before meals)       metoprolol 25 MG 24 hr tablet    TOPROL-XL    30 tablet    Take 1 tablet (25 mg) by mouth daily       MULTIVITAMIN PO      Take 1 tablet by mouth daily       nitroglycerin 0.4 MG sublingual tablet    NITROSTAT    25 tablet    Place 1 tablet (0.4 mg) under the tongue every 5 minutes as needed for chest pain if you are still having symptoms after 3 doses (15 minutes) call 911.       ondansetron 4 MG ODT tab    ZOFRAN-ODT    120 tablet    Take 1 tablet (4 mg) by mouth every 6 hours       * order for DME     1 Month    Equipment being ordered:  Depends briefs       * order for DME     1 Device    Equipment being ordered: Power Wheelchair       * order for DME     1 Units    Equipment being ordered: Nebulizer and supplies       * order for DME     1 Box    Equipment being ordered: Glucerna daily shakes with each meal       * order for DME     1 Device    ACcu check BID       * order for DME     1 Units    Equipment being ordered: Nebulizer and tubing supplies       * order for DME     1 Device    Equipment being ordered: CPAP supplies mask, hose, filters, cushion fax to [facility] at [phone] Disp: 10 DeviceRefills: prn Class: Local PrintStart: 2/10/2017       * order for DME     10 Device    Equipment being ordered: CPAP supplies mask, hose, filters, cushion  fax to [facility] at [phone]       * order for DME     1 Device    Equipment being ordered: wheelchair seat cushion       oxyCODONE-acetaminophen 5-325 MG per tablet    PERCOCET    60 tablet    Take 1-2 tablets every 3 hours as needed for pain.       pantoprazole 40 MG EC tablet    PROTONIX    60 tablet    Take 1 tablet (40 mg) by mouth 2 times daily Then QD       Phenytoin Sodium Extended 200 MG Caps     60 capsule    Take 200 mg by mouth 2 times daily       polyethylene glycol powder    MIRALAX/GLYCOLAX     Take 17 g by mouth daily       pregabalin 75 MG capsule    LYRICA    90 capsule    Take 1 capsule (75 mg) by mouth 2 times daily (takes at 8 AM and 8 PM)       prochlorperazine 10 MG tablet    COMPAZINE    20 tablet    Take 1 tablet (10 mg) by mouth every 8 hours as needed       risperiDONE 1 MG tablet    risperDAL    90 tablet    Take 0.5 tablets (0.5 mg) by mouth At Bedtime       rOPINIRole 0.25 MG tablet    REQUIP    270 tablet    Take 3 tablets (0.75 mg) by mouth At Bedtime       SPIRIVA HANDIHALER 18 MCG capsule   Generic drug:  tiotropium     30 capsule    INHALE THE CONTENTS OF 1 CAPSULE VIA INHALATION DEVICE DAILY       * sucralfate 1 GM/10ML suspension    CARAFATE     1200 mL    Take 10 mLs (1 g) by mouth 4 times daily (before meals and nightly)       * sucralfate 1 GM tablet    CARAFATE    40 tablet    Take 1 tablet (1 g) by mouth 4 times daily May dissolve in 10 mL water is necessary. (Start upon completion of carafate suspension)       traZODone 100 MG tablet    DESYREL    90 tablet    Take 1 tablet (100 mg) by mouth At Bedtime       vitamin D 2000 UNITS tablet     100 tablet    Take 2,000 Units by mouth daily.       zinc 50 MG Tabs      Take 1 tablet by mouth daily       * Notice:  This list has 14 medication(s) that are the same as other medications prescribed for you. Read the directions carefully, and ask your doctor or other care provider to review them with you.

## 2017-03-06 NOTE — LETTER
[facility]  [address]  [address]      March 6, 2017      Sonya Foote  [address]   [place] [address]          Dear Sonya,      I have enclosed a copy of your most recent labs done here at the clinic and if available some of your prior labs for comparison.     In addition, your PSA or prostate screening antigen is normal and should be repeated annually.    Please call me if you have further questions.        Allan Casey MD

## 2017-03-07 ASSESSMENT — PATIENT HEALTH QUESTIONNAIRE - PHQ9: SUM OF ALL RESPONSES TO PHQ QUESTIONS 1-9: 9

## 2017-03-07 NOTE — PROGRESS NOTES
3/6/17: Received call from member stating she was sitting after apt with PCP waiting for her ride and wanted to update CM - she is having more issues with urinary incontinence - she states PCP is referring her to urologist.   She is wondering if she needs to start using catheter.   She states she is going through many incontinence products and can't seem to hold her urine as much anymore - this has worsened since last amputation.     Member also states she needs a new seat for her power chair - [facility] had sent request to PCP.  She discussed with PCP at visit and he had sent rx to incorrect vendor.   The rx is now sent to [facility].        Jamie Sharma RN, N  [facility]

## 2017-03-15 ENCOUNTER — OFFICE VISIT (OUTPATIENT)
Dept: SLEEP MEDICINE | Facility: CLINIC | Age: 67
End: 2017-03-15
Payer: COMMERCIAL

## 2017-03-15 ENCOUNTER — CARE COORDINATION (OUTPATIENT)
Dept: GERIATRIC MEDICINE | Facility: CLINIC | Age: 67
End: 2017-03-15

## 2017-03-15 VITALS
SYSTOLIC BLOOD PRESSURE: 149 MMHG | OXYGEN SATURATION: 94 % | HEART RATE: 78 BPM | WEIGHT: 135 LBS | DIASTOLIC BLOOD PRESSURE: 81 MMHG | BODY MASS INDEX: 21.14 KG/M2

## 2017-03-15 DIAGNOSIS — G47.39 COMPLEX SLEEP APNEA SYNDROME: ICD-10-CM

## 2017-03-15 DIAGNOSIS — Z76.89 HEALTH CARE HOME: Chronic | ICD-10-CM

## 2017-03-15 DIAGNOSIS — G47.33 OSA (OBSTRUCTIVE SLEEP APNEA): ICD-10-CM

## 2017-03-15 DIAGNOSIS — I10 ESSENTIAL HYPERTENSION WITH GOAL BLOOD PRESSURE LESS THAN 130/80: Primary | ICD-10-CM

## 2017-03-15 PROCEDURE — 99213 OFFICE O/P EST LOW 20 MIN: CPT | Performed by: INTERNAL MEDICINE

## 2017-03-15 NOTE — NURSING NOTE
"Chief Complaint   Patient presents with     RECHECK     titration results       Initial Wt 61.2 kg (135 lb)  BMI 21.14 kg/m2 Estimated body mass index is 21.14 kg/(m^2) as calculated from the following:    Height as of 1/9/17: 1.702 m (5' 7\").    Weight as of this encounter: 61.2 kg (135 lb).  Medication Reconciliation: complete    "

## 2017-03-15 NOTE — PROGRESS NOTES
3/15/17: Received call from member after appt - member was grateful for CM's assistance with ride.   Member states appt went well and she is getting a new CPAP  - they will be calling her and coming out to her home to install.     Jamie Sharma RN, N  [employer]

## 2017-03-15 NOTE — PROCEDURES
SLEEP STUDY INTERPRETATION  TITRATION STUDY      Patient: Sonya Foote  YOB: 1950  Study Date: 2/26/2017  MRN: [id number]  Referring Provider: -  Ordering Provider: Guanaco Kowalski MD    Indications for Polysomnography: The patient is a 67 y old Female who weighs 130.0 lbs.  Unable to obtain height due to bilateral amputation of legs, and thus BMI is not calculable.  Her [place] sleepiness scale is 16.0 and neck size is 34.0.  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  A diagnostic polysomnogram PAP titration was performed for JOSE and CSA.    Polysomnogram Data:  A full night polysomnogram recorded the standard physiologic parameters including EEG, EOG, EMG, ECG, nasal and oral airflow.  Respiratory parameters of chest and abdominal movements were recorded with respiratory inductance plethysmography.  Oxygen saturation was recorded by pulse oximetry.      Treatment PSG:  Sleep Architecture: Decreased sleep latency with sleep aid (Trazodone), increased arousal index, and normal sleep efficiency.  The total recording time of the study was 551.1 minutes.  The total sleep time was 508.0 minutes.  Sleep latency was decreased at 0.7 minutes with the use of a sleep aid.  REM latency was 197.0 minutes.  Arousal index was increased at 33.4 arousals per hour.  Sleep efficiency was normal at 92.2%.  Wake after sleep onset was 42.5 minutes.   The patient spent 15.1% of total sleep time in Stage N1, 70.8% in Stage N2, 6.5% in Stage N3 and 7.7% in REM.     Respiration: Severe complex sleep apnea.  Unacceptable titration due to high residual AHI.  CPAP improved hypoxemia and lowered severity of sleep-disordered breathing.  At CPAP of 11 or 12 cmH2O, best settings of study were  seen.    The patient was titrated at pressures ranging from 5 cmH2O up to 12 cmH2O.  The optimal pressure achieved was 12 cmH2O with a residual AHI of 38.5 events per hour.  Time in REM supine on final pressure was 7.0 minutes.     This titration was considered unacceptable.    Respiratory rate and pattern - was normal.    Sustained Sleep Associated Hypoventilation - Transcutaneous carbon dioxide monitoring was not used, however significant hypoventilation was not suggested by oximetry.    Sleep Associated Hypoxemia - (Greater than 5 minutes O2 sat below 89%) was / was not present.  Baseline oxygen saturation was 96.3%. Lowest oxygen saturation was 86.2%.  Time spent less than or equal to 88% was 0.4 minutes.  Time spent less than or equal to 89% was 1.1 minutes.    Movement Activity: No abnormal behaviors noted.    Periodic Limb Activity - There were 0 PLMs during the entire study. Of note, patient is status post bilateral above knee amputation.    REM EMG Activity - Excessive muscle activity was not present.    Nocturnal Behavior - Abnormal sleep related behaviors were not noted.    Bruxism - None apparent.    Cardiac Summary: No arrhythmias noted.  The average pulse rate was 78.4 bpm.  The minimum pulse rate was 59.1 bpm while the maximum pulse rate was 94.1 bpm. The rhythm is normal sinus. Arrhythmias were not noted.    Assessment:     Decreased sleep latency with sleep aid (Trazodone), increased arousal index, and normal sleep efficiency.    Severe complex sleep apnea.      Unacceptable titration due to high residual AHI.      CPAP improved hypoxemia and lowered severity of sleep-disordered breathing.  CPAP increases sequentially improved hypoxemia but no further reductions in AHI were seen at 9 cmH2O or higher.  Based on respiratory patterns, no further improvements were likely to be seen.    ASV and Bilevel PAP contraindicated due to comorbid conditions.    Recommendations:    Treatment of JOSE with CPAP at 12  cmH2O based on improvement in hypoxemia.  Recommend clinical follow up with sleep management team, for coaching and review of effectiveness and compliance measures.    Advise regarding the risks of drowsy driving.    Suggest optimizing sleep schedule and avoiding sleep deprivation.    Weight management (if BMI > 30).    Cardiac Comments: :Normal Sinus Rhythm  Diagnostic Codes:      Obstructive Sleep Apnea G47.33    Primary Central Sleep Apnea G47.31  _____________________________________  Electronically Signed By: Guanaco Kowalski MD 3/15/2017

## 2017-03-15 NOTE — PROGRESS NOTES
Sleep Study Follow-Up Visit:    Date on this visit: 3/15/2017    Sonya Foote comes in today for follow-up of her sleep study done on 2/26/2017 at the [facility] for possible sleep apnea.    Sleep latency 0.7 minutes with sleep aid.  REM achieved.   REM latency 197 minutes.  Sleep efficiency 92.2%. Total sleep time 508 minutes.    Sleep architecture:  Stage 1, 15.1% (5%), stage 2, 70.8% (45-55%), stage 3, 6.5% (15-20%), stage REM, 7.7% (20-25%).    Full night titration study.  CPAP 5 - 12 cmH2O tried.  Unacceptable titration due to high residual AHI, however, oxygen desaturations were controlled on CPAP of 10 cmH2O or higher, and looked better on CPAP of 12 cmH2O.    These findings were reviewed with patient.     Past medical/surgical history, family history, social history, medications and allergies were reviewed.      Problem List - Reviewed in EPIC.     Impression/Plan:  1. Complex sleep apnea syndrome  2. JOSE (obstructive sleep apnea)  Patient with complex sleep apnea with predominantly obstructive events presenting after titration Polysomnography.  Wants to switch DME provider to [facility].  Will plan to work with [facility] for home set up and STM enrollment.  Set at CPAP 12 cmH2O.  Replacement device with modem if eligible for severe Obstructive Sleep Apnea with complex sleep apnea.    Otherwise needs new mask and supplies, and a pressure change (set at CPAP 12 cmH2O).    - Comprehensive DME    3. Essential hypertension with goal blood pressure less than 130/80  Patient to follow up with Primary Care provider regarding elevated blood pressure.     She will follow up with me in about 6 - 8 week(s).     23 minutes spent with patient, all of which were spent face-to-face counseling, consulting, coordinating plan of care.      Guanaco Kowalski MD, Sleep Physician  Mar 15, 2017     CC: Allan Casey

## 2017-03-15 NOTE — PROGRESS NOTES
3/15/17: CM received phone call from member - she was very upset stating that she has appt today at  [facility] at 2:30 and [employer] (special transportation provider) canceled her ride stating that the provider is not in network per [employer].   Member is very upset.  CM placed call to [employer] [facility] to clarify - they would not assist and stated CM needed to call [employer] - CM placed call to [employer] at [phone] - spoke with a woman who states she had just read an email re: this and wants to clarify with her coworker.        Received call back from [employer] stating that they realize they have brought member to facility before but they clarified with [employer] that they could not find provider in network.        CM and member called [employer] - spoke with Mekhi - he stated he could not find dr or provider as in network  - states that provider needs to contact [facility] to get on network.  While on phone member received another phone call - it was [employer] stating that they spoke with a Supervisor at [facility] and they verified that provider is in network and will pick her up for ride.       Jamie Sharma, RN, PHN  [employer]

## 2017-03-15 NOTE — MR AVS SNAPSHOT
After Visit Summary   3/15/2017    Sonya Foote    MRN: [id number]           Patient Information     Date Of Birth          1950        Visit Information        Provider Department      3/15/2017 2:30 PM Guanaco Kowalski MD [facility]        Today's Diagnoses     Essential hypertension with goal blood pressure less than 130/80    -  1    Complex sleep apnea syndrome        JOSE (obstructive sleep apnea)           Follow-ups after your visit        Follow-up notes from your care team     Return in about 2 months (around 5/15/2017) for PAP Compliance Check.      Your next 10 appointments already scheduled     Mar 22, 2017 12:30 PM CDT   New Patient Visit with Elizabeth Oliver MD   [facility] [place] ([facility])    [address]  [address]  [facility] [facility] [address] [phone]            Mar 30, 2017 11:15 AM CDT   (Arrive by 11:00 AM)   New Patient Visit with Ky Ramírez MD   [facility] ([facility] [place])    [address]  [address] [facility] [address] [phone]            Mar 31, 2017 10:30 AM CDT   Office Visit with Elizabeth [facility] ([facility])    [address]  [address]  [facility] [id number] [phone]           Bring a current list of meds and any records pertaining to this visit.  For Physicals, please bring immunization records and any forms needing to be filled out.  Please arrive 10 minutes early to complete paperwork.            Apr 04, 2017  2:45 PM CDT   Return Visit with Bj Anderson MD   [facility] [place] ([facility])    [address]  [place] [address] [phone]            Apr 26, 2017  8:40 AM CDT   (Arrive by 8:25 AM)   NEW HEART FAILURE with Radha Aldrich MD   [facility] ([facility]  "[facility])    [address] [place] [address]   [phone]            May 12, 2017 10:50 AM CDT   (Arrive by 10:35 AM)   RETURN DIABETES with Michelle Irizarry MD   [facility] ([facility])    [address] [place] [address]   [phone]            Jul 10, 2017 11:00 AM CDT   (Arrive by 10:45 AM)   Return Visit with Alina Jennings MD   [facility] ([facility])    [address] [place] [address]   [phone]              Who to contact     If you have questions or need follow up information about today's clinic visit or your schedule please contact [facility] directly at [phone].  Normal or non-critical lab and imaging results will be communicated to you by [employer], letter or phone within 4 business days after the clinic has received the results. If you do not hear from us within 7 days, please contact the clinic through [employer] or phone. If you have a critical or abnormal lab result, we will notify you by phone as soon as possible.  Submit refill requests through txtr or call your pharmacy and they will forward the refill request to us. Please allow 3 business days for your refill to be completed.          Additional Information About Your Visit        txtr Information     txtr lets you send messages to your doctor, view your test results, renew your prescriptions, schedule appointments and more. To sign up, go to [email or web address] . Click on \"Log in\" on the left side of the screen, which will take you to the Welcome page. Then click on \"Sign up Now\" on the right side of the page.     You will be asked to enter the access code listed below, as well as some personal information. Please follow the directions to create your username and password.     Your access code is: " [email or web address]  Expires: 3/26/2017  7:30 AM     Your access code will  in 90 days. If you need help or a new code, please call your [facility] clinic or [phone].        Care EveryWhere ID     This is your Care EveryWhere ID. This could be used by other organizations to access your [facility] medical records  [id number]        Your Vitals Were     Pulse Pulse Oximetry BMI (Body Mass Index)             78 94% 21.14 kg/m2          Blood Pressure from Last 3 Encounters:   03/15/17 149/81   17 126/68   17 113/62    Weight from Last 3 Encounters:   03/15/17 61.2 kg (135 lb)   17 59 kg (130 lb)   17 59 kg (130 lb)              We Performed the Following     Comprehensive DME          Today's Medication Changes          These changes are accurate as of: 3/15/17  2:57 PM.  If you have any questions, ask your nurse or doctor.               These medicines have changed or have updated prescriptions.        Dose/Directions    atorvastatin 40 MG tablet   Commonly known as:  LIPITOR   This may have changed:  when to take this   Used for:  Hyperlipidemia LDL goal <100        Dose:  40 mg   Take 1 tablet (40 mg) by mouth daily   Quantity:  90 tablet   Refills:  3       clopidogrel 75 MG tablet   Commonly known as:  PLAVIX   This may have changed:  when to take this   Used for:  PAD (peripheral artery disease) (H), UGI bleed, Osteoporosis, Nausea        Dose:  75 mg   Take 1 tablet (75 mg) by mouth daily   Quantity:  90 tablet   Refills:  3       levETIRAcetam 500 MG tablet   Commonly known as:  KEPPRA   This may have changed:  when to take this   Used for:  Nausea        Dose:  500 mg   Take 1 tablet (500 mg) by mouth 2 times daily   Quantity:  180 tablet   Refills:  1       Phenytoin Sodium Extended 200 MG Caps   This may have changed:  additional instructions   Used for:  Seizure disorder (H)        Dose:  200 mg   Take 200 mg by mouth 2 times daily   Quantity:  60 capsule   Refills:  0                 Primary Care Provider Office Phone # Fax #    Allan Casey [phone]       [facility] [address] [facility] [address]        Thank you!     Thank you for choosing [facility]  for your care. Our goal is always to provide you with excellent care. Hearing back from our patients is one way we can continue to improve our services. Please take a few minutes to complete the written survey that you may receive in the mail after your visit with us. Thank you!             Your Updated Medication List - Protect others around you: Learn how to safely use, store and throw away your medicines at www.disposemymeds.org.          This list is accurate as of: 3/15/17  2:57 PM.  Always use your most recent med list.                   Brand Name Dispense Instructions for use    ADVAIR DISKUS 250-50 MCG/DOSE diskus inhaler   Generic drug:  fluticasone-salmeterol      Inhale 2 puffs into the lungs daily       albuterol (2.5 MG/3ML) 0.083% neb solution     360 mL    INHALE 1 VIAL VIA NEBULIZER EVERY 6 HOURS AS NEEDED       aspirin 81 MG EC tablet     90 tablet    Take 1 tablet (81 mg) by mouth daily       atorvastatin 40 MG tablet    LIPITOR    90 tablet    Take 1 tablet (40 mg) by mouth daily       blood glucose monitoring meter device kit     1 kit    Use to test blood sugars 3 times daily or as directed.       blood glucose monitoring test strip    ONE TOUCH ULTRA    3 Box    Use to test blood sugars 3 times daily or as directed.       calcium citrate-vitamin D 315-250 MG-UNIT Tabs per tablet    CITRACAL    120 tablet    Take 2 tablets by mouth daily       cetirizine 10 MG tablet    zyrTEC    90 tablet    Take 1 tablet (10 mg) by mouth every evening       clopidogrel 75 MG tablet    PLAVIX    90 tablet    Take 1 tablet (75 mg) by mouth daily       CYANOCOBALAMIN PO      Take 2,000 mcg by mouth daily       diazepam 5 MG tablet    VALIUM    20 tablet    Take 1 tablet (5 mg) by  mouth every 6 hours as needed for anxiety       DOCUSATE SODIUM PO      Take 100 mg by mouth daily       dorzolamide 2 % ophthalmic solution    TRUSOPT     Place 1 drop into both eyes 2 times daily       * EASY TOUCH LANCETS 30G/TWIST Misc          * thin lancets    NO BRAND SPECIFIED    1 Box    Use to test blood sugar 3 times daily or as directed.       * blood glucose monitoring lancets     3 Box    Use to test blood sugar 3 times daily or as directed.       escitalopram 20 MG tablet    LEXAPRO    90 tablet    One per day       estradiol 1 MG tablet    ESTRACE    90 tablet    Take 1 tablet (1 mg) by mouth daily       famotidine 20 MG tablet    PEPCID    60 tablet    Take 1 tablet (20 mg) by mouth 2 times daily       ferrous sulfate 325 (65 FE) MG tablet    IRON    60 tablet    Take 1 tablet (325 mg) by mouth 2 times daily With meals       FLOMAX 0.4 MG capsule   Generic drug:  tamsulosin      Take 0.4 mg by mouth At Bedtime       fluticasone 50 MCG/ACT spray    FLONASE     Spray 1 spray into both nostrils daily       gabapentin 300 MG capsule    NEURONTIN    90 capsule    Take 1 capsule (300 mg) by mouth 3 times daily       hydrochlorothiazide 12.5 MG capsule    MICROZIDE    90 capsule    Take 1 capsule (12.5 mg) by mouth daily       HYDROmorphone 2 MG tablet    DILAUDID    30 tablet    Take 1 tablet (2 mg) by mouth every 3 hours as needed for moderate to severe pain       RENÉE Pack      Take 1 packet by mouth 2 times daily       lactulose 10 GM/15ML solution    CHRONULAC    946 mL    Take 30 mLs (20 g) by mouth daily as needed for constipation       latanoprost 0.005 % ophthalmic solution    XALATAN    2.5 mL    Place 1 drop into both eyes At Bedtime       levETIRAcetam 500 MG tablet    KEPPRA    180 tablet    Take 1 tablet (500 mg) by mouth 2 times daily       lidocaine 5 % Patch    LIDODERM    30 patch    Apply from 1 to 3 patches to painful area at once for up to 12 h within a 24 h period.  Remove after 12  hours.       lisinopril 2.5 MG tablet    PRINIVIL/Zestril    90 tablet    Take 1 tablet (2.5 mg) by mouth daily       MEDICATION GIVEN BY INTRATHECAL PUMP - INSTRUCTION      continuous 4/11/2016 per [facility] in [place] [phone]: Conc: Bupivacaine 20 mg/mL and Fentanyl 2000 mcg/mL. Continuous: Bupivacaine 5.052 mg/day and Fentanyl 505.2 mcg/day. Boluses: Up to 7 boluses per 24-hr period of Bupivicaine 0.5992 mg and Fentanyl 59.9 mcg Max daily dose: Bupivacaine 9.1973 mg/day and Fentanyl 919.5883 mcg/day  Pump Last Fill Date:  6/30/2016 Pump Refill Date: 9/20/2016       metoclopramide 5 MG tablet    REGLAN    240 tablet    Take 1 tablet (5 mg) by mouth 3 times daily (before meals)       metoprolol 25 MG 24 hr tablet    TOPROL-XL    30 tablet    Take 1 tablet (25 mg) by mouth daily       MULTIVITAMIN PO      Take 1 tablet by mouth daily       nitroglycerin 0.4 MG sublingual tablet    NITROSTAT    25 tablet    Place 1 tablet (0.4 mg) under the tongue every 5 minutes as needed for chest pain if you are still having symptoms after 3 doses (15 minutes) call 911.       ondansetron 4 MG ODT tab    ZOFRAN-ODT    120 tablet    Take 1 tablet (4 mg) by mouth every 6 hours       * order for DME     1 Month    Equipment being ordered: Depends briefs       * order for DME     1 Device    Equipment being ordered: Power Wheelchair       * order for DME     1 Units    Equipment being ordered: Nebulizer and supplies       * order for DME     1 Box    Equipment being ordered: Glucerna daily shakes with each meal       * order for DME     1 Device    ACcu check BID       * order for DME     1 Units    Equipment being ordered: Nebulizer and tubing supplies       * order for DME     1 Device    Equipment being ordered: CPAP supplies mask, hose, filters, cushion fax to [facility] at [phone] Disp: 10 DeviceRefills: prn Class: Local PrintStart: 2/10/2017       * order for DME     10 Device     Equipment being ordered: CPAP supplies mask, hose, filters, cushion  fax to [facility] at [phone]       * order for DME     1 Device    Equipment being ordered: wheelchair seat cushion       oxyCODONE-acetaminophen 5-325 MG per tablet    PERCOCET    60 tablet    Take 1-2 tablets every 3 hours as needed for pain.       pantoprazole 40 MG EC tablet    PROTONIX    60 tablet    Take 1 tablet (40 mg) by mouth 2 times daily Then QD       Phenytoin Sodium Extended 200 MG Caps     60 capsule    Take 200 mg by mouth 2 times daily       polyethylene glycol powder    MIRALAX/GLYCOLAX     Take 17 g by mouth daily       pregabalin 75 MG capsule    LYRICA    90 capsule    Take 1 capsule (75 mg) by mouth 2 times daily (takes at 8 AM and 8 PM)       prochlorperazine 10 MG tablet    COMPAZINE    20 tablet    Take 1 tablet (10 mg) by mouth every 8 hours as needed       risperiDONE 1 MG tablet    risperDAL    90 tablet    Take 0.5 tablets (0.5 mg) by mouth At Bedtime       rOPINIRole 0.25 MG tablet    REQUIP    270 tablet    Take 3 tablets (0.75 mg) by mouth At Bedtime       SPIRIVA HANDIHALER 18 MCG capsule   Generic drug:  tiotropium     30 capsule    INHALE THE CONTENTS OF 1 CAPSULE VIA INHALATION DEVICE DAILY       * sucralfate 1 GM/10ML suspension    CARAFATE    1200 mL    Take 10 mLs (1 g) by mouth 4 times daily (before meals and nightly)       * sucralfate 1 GM tablet    CARAFATE    40 tablet    Take 1 tablet (1 g) by mouth 4 times daily May dissolve in 10 mL water is necessary. (Start upon completion of carafate suspension)       traZODone 100 MG tablet    DESYREL    90 tablet    Take 1 tablet (100 mg) by mouth At Bedtime       vitamin D 2000 UNITS tablet     100 tablet    Take 2,000 Units by mouth daily.       zinc 50 MG Tabs      Take 1 tablet by mouth daily       * Notice:  This list has 14 medication(s) that are the same as other medications prescribed for you. Read the directions carefully, and ask your doctor or  other care provider to review them with you.

## 2017-03-17 ENCOUNTER — CARE COORDINATION (OUTPATIENT)
Dept: GERIATRIC MEDICINE | Facility: CLINIC | Age: 67
End: 2017-03-17

## 2017-03-17 NOTE — PROGRESS NOTES
3/17/17: CM received call from member stating that she has urology appt next week on 3/22 at [facility] - she called [employer] ([employer] transportation through [facility]) to schedule and she states that they mentioned to her again about Urology needing to be in network - she states that she told them it was  clinic and is in network - she is asking CM to f/u.  CM placed call to on - spoke with Ali  - she states she sees notes and appears everythign is ok for ride - she is aware and made notes that if any issues with in network to contact a supervisor at [facility] as they did earlier this week.    CM relayed this to member.     Jamie Sharma, RN, PHN  [employer]

## 2017-03-21 DIAGNOSIS — R35.0 URINARY FREQUENCY: Primary | ICD-10-CM

## 2017-03-22 ENCOUNTER — OFFICE VISIT (OUTPATIENT)
Dept: UROLOGY | Facility: CLINIC | Age: 67
End: 2017-03-22
Payer: COMMERCIAL

## 2017-03-22 VITALS — OXYGEN SATURATION: 95 % | WEIGHT: 133 LBS | HEART RATE: 76 BPM | BODY MASS INDEX: 20.83 KG/M2

## 2017-03-22 DIAGNOSIS — R39.81 FUNCTIONAL INCONTINENCE: ICD-10-CM

## 2017-03-22 DIAGNOSIS — N39.0 RECURRENT UTI: Primary | ICD-10-CM

## 2017-03-22 PROCEDURE — 99202 OFFICE O/P NEW SF 15 MIN: CPT | Performed by: UROLOGY

## 2017-03-22 ASSESSMENT — ENCOUNTER SYMPTOMS
SHORTNESS OF BREATH: 1
BLOATING: 0
DOUBLE VISION: 1
SEIZURES: 1
HALLUCINATIONS: 0
COUGH DISTURBING SLEEP: 1
BLOOD IN STOOL: 0
SPEECH CHANGE: 1
FEVER: 0
NERVOUS/ANXIOUS: 0
DECREASED CONCENTRATION: 0
HOT FLASHES: 0
COUGH: 1
SLEEP DISTURBANCES DUE TO BREATHING: 1
HOARSE VOICE: 1
TREMORS: 1
SWOLLEN GLANDS: 0
JAUNDICE: 0
HEMATURIA: 0
WEAKNESS: 1
INSOMNIA: 0
RECTAL PAIN: 0
HEMOPTYSIS: 0
LOSS OF CONSCIOUSNESS: 1
SINUS PAIN: 0
EYE IRRITATION: 1
HYPOTENSION: 1
VOMITING: 0
HEADACHES: 1
WEIGHT LOSS: 0
FLANK PAIN: 0
BACK PAIN: 0
NECK MASS: 0
SORE THROAT: 0
TINGLING: 1
TASTE DISTURBANCE: 0
CONSTIPATION: 0
DISTURBANCES IN COORDINATION: 0
PANIC: 0
DYSURIA: 0
DIARRHEA: 0
EYE WATERING: 0
MYALGIAS: 0
JOINT SWELLING: 0
WEIGHT GAIN: 0
HEARTBURN: 0
RECTAL BLEEDING: 0
MUSCLE WEAKNESS: 0
BRUISES/BLEEDS EASILY: 1
LIGHT-HEADEDNESS: 0
CHILLS: 0
NUMBNESS: 1
MEMORY LOSS: 1
HYPERTENSION: 1
NAUSEA: 0
ALTERED TEMPERATURE REGULATION: 0
ARTHRALGIAS: 0
ORTHOPNEA: 1
EYE PAIN: 1
STIFFNESS: 0
DIZZINESS: 1
CLAUDICATION: 0
INCREASED ENERGY: 0
LEG PAIN: 0
POSTURAL DYSPNEA: 0
BREAST PAIN: 0
BREAST MASS: 0
TACHYCARDIA: 1
DEPRESSION: 0
POOR WOUND HEALING: 0
SNORES LOUDLY: 1
SKIN CHANGES: 0
ABDOMINAL PAIN: 0
NAIL CHANGES: 0
BOWEL INCONTINENCE: 0
PALPITATIONS: 1
DYSPNEA ON EXERTION: 1
WHEEZING: 1
SYNCOPE: 0
TROUBLE SWALLOWING: 1
DECREASED APPETITE: 0
LEG SWELLING: 0
SPUTUM PRODUCTION: 1
MUSCLE CRAMPS: 0
FATIGUE: 0
RESPIRATORY PAIN: 0
POLYPHAGIA: 0
DIFFICULTY URINATING: 1
SMELL DISTURBANCE: 0
EYE REDNESS: 0
DECREASED LIBIDO: 0
NIGHT SWEATS: 0
POLYDIPSIA: 0
EXERCISE INTOLERANCE: 0
NECK PAIN: 0
SINUS CONGESTION: 1

## 2017-03-22 ASSESSMENT — PAIN SCALES - GENERAL: PAINLEVEL: SEVERE PAIN (7)

## 2017-03-22 NOTE — LETTER
"3/22/2017       RE: Sonya Foote  [address]   [place] [id number]     Dear Colleague,    Thank you for referring your patient, Sonya Foote, to the [facility] at [facility]. Please see a copy of my visit note below.    March 22, 2017    Referring Provider/Primary Care Provider: Allan Casey    CC: \"I want a catheter\"    HPI:  Sonya Foote is a 67 year old female who presents for evaluation of her pelvic floor symptoms. Patient states that she was \"born with both parts,\" decision was made when she was a child to \"make her a girl\" where she states that the penis was removed and at 14 yo had surgery where a neovagina was created.  Although she feels she may have always identified as male she lives as a female. Also reports that she does have a prostate, PSAs have been normal.  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  She states that she is here because since her amputation the only toilets that are accessible to her is the one in her home. She states that at rehab they essentially told her that she would just need to wear diapers when she left the house.  She also does have UUI-taking flomax, tried anticholinergics in the past.  Also has had Kari with a couple episodes of pyelonephritis as well, last episode in December    Past Medical History:   Diagnosis Date     Anemia      Antiplatelet or antithrombotic long-term use      Arthritis      AS (sickle cell trait) (H) 10/8/2013     Blind left eye      BPPV (benign paroxysmal positional vertigo)      Chronic back pain      COPD (chronic obstructive pulmonary disease) (H)      Coronary artery disease      CVA (cerebral infarction) 10/8/2012    x3, residual left sided weakness     Diastolic CHF (H) 9/4/2012     Dilantin toxicity 5/31/2013     Esophageal candidiasis (H)      GERD (gastroesophageal reflux disease)      Heart attack (H)      " Hernia, abdominal      History of blood transfusion     x5     History of thrombophlebitis      HTN (hypertension) 9/4/2012     Hyperlipidemia LDL goal <100 9/4/2012     Legally blind in right eye, as defined in [place]     No periphal vision right eye     Osteoporosis 1/21/2013     Other chronic pain      PAD (peripheral artery disease) (H)      Palpitations      Person who has had sex change operation 1/20/2014     Pneumonia      Schizoaffective disorder (H)      Seizure disorder (H) 9/4/2012     Seizures (H)      Sleep apnea     CPAP     Thrombosis of leg      Type 2 diabetes, HbA1C goal < 8% (H) 9/4/2012     Uncomplicated asthma      Unspecified cerebral artery occlusion with cerebral infarction      Past Surgical History:   Procedure Laterality Date     AMPUTATE LEG ABOVE KNEE Left 6/11/2016    Procedure: AMPUTATE LEG ABOVE KNEE;  Surgeon: Mello Rodriguez MD;  [facility]     AMPUTATE LEG BELOW KNEE Right 11/7/2016    Procedure: AMPUTATE LEG BELOW KNEE;  Surgeon: Savannah Durant MD;  [facility]     AMPUTATE REVISION STUMP LOWER EXTREMITY Right 11/11/2016    Procedure: AMPUTATE REVISION STUMP LOWER EXTREMITY;  Surgeon: Savannah Durant MD;  [facility]     AMPUTATE REVISION STUMP LOWER EXTREMITY Right 11/16/2016    Procedure: AMPUTATE REVISION STUMP LOWER EXTREMITY;  Surgeon: Savannah Durant MD;  [facility]     AMPUTATE TOE(S) Right 1/5/2016    Procedure: AMPUTATE TOE(S);  Surgeon: Mello Gaines DPM;  Location:  [facility]     ANGIOGRAM Bilateral 11/21/2014    Procedure: ANGIOGRAM;  Surgeon: Savannah Durant MD;  [facility]     ANGIOGRAM Left 1/16/2015    Procedure: ANGIOGRAM;  Surgeon: Savannah Durant MD;  [facility]     ANGIOGRAM Bilateral 9/14/2015    Procedure: ANGIOGRAM;  Surgeon: Savannah Durant MD;  [facility]     ANGIOGRAM Left 10/12/2015    Procedure: ANGIOGRAM;  Surgeon: Savannah Durant MD;  [facility]     ANGIOGRAM Right 6/6/2016    Procedure: ANGIOGRAM;  Surgeon: Savannah Durant  MD;  [facility]     ANGIOPLASTY Right 6/6/2016    Procedure: ANGIOPLASTY;  Surgeon: Savannah Durant MD;  [facility]     APPENDECTOMY       BREAST SURGERY      right breast bx (benign)     CHOLECYSTECTOMY       COLONOSCOPY N/A 8/25/2014    Procedure: COLONOSCOPY;  Surgeon: Mello Ferrer MD;  [facility]     COLONOSCOPY WITH CO2 INSUFFLATION N/A 8/20/2014    Procedure: COLONOSCOPY WITH CO2 INSUFFLATION;  Surgeon: Duane, William Charles, MD;  Location: [facility]     ENDARTERECTOMY FEMORAL  5/23/2014    Procedure: ENDARTERECTOMY FEMORAL;  Surgeon: Jason Joshi MD;  [facility]     ESOPHAGOSCOPY, GASTROSCOPY, DUODENOSCOPY (EGD), COMBINED  12/14/2012    Procedure: COMBINED ESOPHAGOSCOPY, GASTROSCOPY, DUODENOSCOPY (EGD), BIOPSY SINGLE OR MULTIPLE;  ESOPHAGOSCOPY, GASTROSCOPY, DUODENOSCOPY (EGD), DILATATION ;  Surgeon: Elizabeth Stevenson MD;  Location:  GI     ESOPHAGOSCOPY, GASTROSCOPY, DUODENOSCOPY (EGD), COMBINED  12/31/2013    Procedure: COMBINED ESOPHAGOSCOPY, GASTROSCOPY, DUODENOSCOPY (EGD), BIOPSY SINGLE OR MULTIPLE;;  Surgeon: Clemente Lopez MD;  [facility]     ESOPHAGOSCOPY, GASTROSCOPY, DUODENOSCOPY (EGD), COMBINED  4/1/2014    Procedure: COMBINED ESOPHAGOSCOPY, GASTROSCOPY, DUODENOSCOPY (EGD);;  Surgeon: Clemente Lopez MD;  [facility]     ESOPHAGOSCOPY, GASTROSCOPY, DUODENOSCOPY (EGD), COMBINED  6/28/2014    Procedure: COMBINED ESOPHAGOSCOPY, GASTROSCOPY, DUODENOSCOPY (EGD);  Surgeon: Clemente Lopez MD;  [facility]     ESOPHAGOSCOPY, GASTROSCOPY, DUODENOSCOPY (EGD), COMBINED N/A 8/20/2014    Procedure: COMBINED ESOPHAGOSCOPY, GASTROSCOPY, DUODENOSCOPY (EGD), BIOPSY SINGLE OR MULTIPLE;  Surgeon: Duane, William Charles, MD;  [facility]: [facility]     ESOPHAGOSCOPY, GASTROSCOPY, DUODENOSCOPY (EGD), COMBINED N/A 8/22/2014    Procedure: COMBINED ESOPHAGOSCOPY, GASTROSCOPY, DUODENOSCOPY (EGD), BIOPSY SINGLE OR MULTIPLE;  Surgeon: Mello Ferrer MD;  Location: [facility]      ESOPHAGOSCOPY, GASTROSCOPY, DUODENOSCOPY (EGD), COMBINED N/A 10/2/2014    Procedure: COMBINED ESOPHAGOSCOPY, GASTROSCOPY, DUODENOSCOPY (EGD), BIOPSY SINGLE OR MULTIPLE;  Surgeon: Remy Haskins MD;  [facility]     ESOPHAGOSCOPY, GASTROSCOPY, DUODENOSCOPY (EGD), COMBINED Left 12/15/2014    Procedure: COMBINED ESOPHAGOSCOPY, GASTROSCOPY, DUODENOSCOPY (EGD), BIOPSY SINGLE OR MULTIPLE;  Surgeon: Remy Haskins MD;  [facility]     ESOPHAGOSCOPY, GASTROSCOPY, DUODENOSCOPY (EGD), COMBINED N/A 2/25/2015    Procedure: COMBINED ENDOSCOPIC ULTRASOUND, ESOPHAGOSCOPY, GASTROSCOPY, DUODENOSCOPY (EGD), FINE NEEDLE ASPIRATE/BIOPSY;  Surgeon: Clemente Lugo MD;  [facility]     ESOPHAGOSCOPY, GASTROSCOPY, DUODENOSCOPY (EGD), COMBINED Left 2/25/2015    Procedure: COMBINED ESOPHAGOSCOPY, GASTROSCOPY, DUODENOSCOPY (EGD), BIOPSY SINGLE OR MULTIPLE;  Surgeon: Clemente Lugo MD;  [facility]     ESOPHAGOSCOPY, GASTROSCOPY, DUODENOSCOPY (EGD), COMBINED N/A 9/25/2016    Procedure: COMBINED ESOPHAGOSCOPY, GASTROSCOPY, DUODENOSCOPY (EGD);  Surgeon: Aziza Patiño MD;  [facility]     ESOPHAGOSCOPY, GASTROSCOPY, DUODENOSCOPY (EGD), COMBINED N/A 1/18/2017    Procedure: COMBINED ESOPHAGOSCOPY, GASTROSCOPY, DUODENOSCOPY (EGD), BIOPSY SINGLE OR MULTIPLE;  Surgeon: Clemente Lopez MD;  [facility]     FASCIOTOMY LOWER EXTREMITY Left 6/10/2016    Procedure: FASCIOTOMY LOWER EXTREMITY;  Surgeon: Mello Rodriguez MD;  [facility]     HC CAPSULE ENDOSCOPY N/A 8/25/2014    Procedure: CAPSULE/PILL CAM ENDOSCOPY;  Surgeon: Remy Haskins MD;  [facility]     HC CAPSULE ENDOSCOPY N/A 10/2/2014    Procedure: CAPSULE/PILL CAM ENDOSCOPY;  Surgeon: Remy Haskins MD;  [facility]     ORTHOPEDIC SURGERY      broken wrist repair     SEX TRANSFORMATION SURGERY, MALE TO FEMALE      1974     SINUS SURGERY      cyst removed     TONSILLECTOMY       VASCULAR SURGERY      Left carotid  "stent     Social History     Social History     Marital status:      Spouse name: Jayde     Number of children: 2     Years of education: N/A     Occupational History     mental health worker      retired     Social History Main Topics     Smoking status: Former Smoker     Packs/day: 2.50     Years: 30.00     Types: Cigarettes, Cigars     Quit date: 11/1/2000     Smokeless tobacco: Never Used     Alcohol use No     Drug use: No     Sexual activity: Yes     Other Topics Concern     Caffeine Concern No     1 in the morning     Social History Narrative    The patient is a former smoker.  She smoked 2.5 packs per year for approximately 30 years.  She quit in 2000 using Wellbutrin and patches.  Her father was in the  and she moved around a lot when she was a kid, and has lived abroad including in [facility] and the [facility].  She was previously employed as a mental health worker. Moved from [place] to [place] in 2012. She is currently living in a senior living apartment, and describes her relationship status as a \" demarco couple.\"  She manages her own medications.  She has no recent travel and no known TB exposures.       Family History   Problem Relation Age of Onset     Dementia Mother      Glaucoma Mother      DIABETES Mother      Coronary Artery Disease Mother      MI     Glaucoma Father      DIABETES Father      Heart Failure Father      CANCER Maternal Aunt      leukemia     Schizophrenia Brother      Depression Brother      Suicide Sister      Depression Sister      DIABETES Sister      CANCER Maternal Aunt      ovarian     Glaucoma Maternal Grandmother      DIABETES Maternal Grandmother      Glaucoma Maternal Grandfather      DIABETES Maternal Grandfather      Glaucoma Paternal Grandmother      DIABETES Paternal Grandmother      Glaucoma Paternal Grandfather      DIABETES Paternal Grandfather      Breast Cancer Sister      CEREBROVASCULAR DISEASE Brother        Review of Systems "     Constitutional:  Negative for fever, chills, weight loss, weight gain, fatigue, decreased appetite, night sweats, recent stressors, height gain, height loss, post-operative complications, incisional pain, hallucinations, increased energy, hyperactivity and confused.   HENT:  Positive for hearing loss, nosebleeds, trouble swallowing, hoarse voice, mouth sores, hearing aid, dry mouth and sinus congestion. Negative for ear pain, tinnitus, sore throat, ear discharge, tooth pain, gum tenderness, taste disturbance, smell disturbance, bleeding gums, sinus pain and neck mass.    Eyes:  Positive for double vision, pain, eye pain, decreased vision, spots, floaters, tunnel vision and eye irritation. Negative for redness, eye watering, eye bulging, eye dryness, flashing lights, strabismus and jaundice.   Respiratory:   Positive for cough, sputum production, shortness of breath, wheezing, sleep disturbances due to breathing, snores loudly, dyspnea on exertion and cough disturbing sleep. Negative for hemoptysis, respiratory pain and postural dyspnea.    Cardiovascular:  Positive for dyspnea on exertion, palpitations, orthopnea, hypertension, hypotension, chest pain on exertion, sleep disturbances due to breathing and tachycardia. Negative for chest pain, claudication, leg swelling, fingers/toes turn blue, syncope, history of heart murmur, chest pain at rest, pacemaker, few scattered varicosities, leg pain, light-headedness, exercise intolerance and edema.   Gastrointestinal:  Negative for heartburn, nausea, vomiting, abdominal pain, diarrhea, constipation, blood in stool, melena, rectal pain, bloating, hemorrhoids, bowel incontinence, jaundice, rectal bleeding, coffee ground emesis and change in stool.   Genitourinary:  Positive for bladder incontinence, urgency, difficulty urinating and nocturia. Negative for dysuria, hematuria, flank pain, vaginal discharge, genital sores, dyspareunia, decreased libido, voiding less  frequently, arousal difficulty, abnormal vaginal bleeding, excessive menstruation, menstrual changes, hot flashes, vaginal dryness and postmenopausal bleeding.   Musculoskeletal:  Negative for myalgias, back pain, joint swelling, arthralgias, stiffness, muscle cramps, neck pain, bone pain, muscle weakness and fracture.   Skin:  Negative for nail changes, itching, poor wound healing, rash, hair changes, skin changes, acne, warts, poor wound healing, scarring, flaky skin, Raynaud's phenomenon, sensitivity to sunlight and skin thickening.   Neurological:  Positive for dizziness, tingling, tremors, speech change, seizures, loss of consciousness, weakness, numbness, headaches, memory loss and difficulty walking. Negative for light-headedness and disturbances in coordination.   Endo/Heme:  Positive for anemia and bruises/bleeds easily. Negative for swollen glands.   Psychiatric/Behavioral:  Positive for memory loss. Negative for depression, hallucinations, decreased concentration, mood swings and panic attacks.    Breast:  Negative for breast discharge, breast mass, breast pain and nipple retraction.   Endocrine:  Negative for altered temperature regulation, polyphagia, polydipsia, unwanted hair growth and change in facial hair.    Allergies   Allergen Reactions     Bee Venom      Penicillins Anaphylaxis     Other reaction(s): Skin Rash and/or Hives     Dilantin [Phenytoin] Other (See Comments)     Generic dilantin only per pt     Iodide Hives     09/11/15: Pt states she can use iodine and doesn't have any problems      Iodine-131      Novocaine [Procaine] Hives     Other reaction(s): Skin Rash and/or Hives     Current Outpatient Prescriptions   Medication     lidocaine (LIDODERM) 5 % Patch     blood glucose monitoring (ONE TOUCH ULTRA) test strip     order for DME     order for DME     famotidine (PEPCID) 20 MG tablet     sucralfate (CARAFATE) 1 GM tablet     pantoprazole (PROTONIX) 40 MG EC tablet     order for DME      oxyCODONE-acetaminophen (PERCOCET) 5-325 MG per tablet     albuterol (2.5 MG/3ML) 0.083% neb solution     sucralfate (CARAFATE) 1 GM/10ML suspension     order for DME     ondansetron (ZOFRAN-ODT) 4 MG ODT tab     metoprolol (TOPROL-XL) 25 MG 24 hr tablet     metoclopramide (REGLAN) 5 MG tablet     CYANOCOBALAMIN PO     gabapentin (NEURONTIN) 300 MG capsule     polyethylene glycol (MIRALAX/GLYCOLAX) powder     HYDROmorphone (DILAUDID) 2 MG tablet     fluticasone (FLONASE) 50 MCG/ACT nasal spray     ADVAIR DISKUS 250-50 MCG/DOSE diskus inhaler     calcium citrate-vitamin D (CITRACAL) 315-250 MG-UNIT TABS     pregabalin (LYRICA) 75 MG capsule     ferrous sulfate (IRON) 325 (65 FE) MG tablet     hydrochlorothiazide (MICROZIDE) 12.5 MG capsule     lisinopril (PRINIVIL,ZESTRIL) 2.5 MG tablet     escitalopram (LEXAPRO) 20 MG tablet     levETIRAcetam (KEPPRA) 500 MG tablet     traZODone (DESYREL) 100 MG tablet     rOPINIRole (REQUIP) 0.25 MG tablet     aspirin EC 81 MG EC tablet     clopidogrel (PLAVIX) 75 MG tablet     risperiDONE (RISPERDAL) 1 MG tablet     blood glucose monitoring (ONE TOUCH ULTRA 2) meter device kit     blood glucose monitoring (ONE TOUCH ULTRASOFT) lancets     phenytoin 200 MG CAPS     DOCUSATE SODIUM PO     tamsulosin (FLOMAX) 0.4 MG 24 hr capsule     dorzolamide (TRUSOPT) 2 % ophthalmic solution     SPIRIVA HANDIHALER 18 MCG inhalation capsule     zinc 50 MG TABS     cetirizine (ZYRTEC) 10 MG tablet     MEDICATION GIVEN BY INTRATHECAL PUMP - INSTRUCTION     atorvastatin (LIPITOR) 40 MG tablet     lactulose (CHRONULAC) 10 GM/15ML solution     order for DME     thin (NO BRAND SPECIFIED) lancets     ORDER FOR DME     ORDER FOR DME     ORDER FOR DME     EASY TOUCH LANCETS 30G/TWIST MISC     Cholecalciferol (VITAMIN D) 2000 UNITS tablet     Multiple Vitamin (MULTIVITAMIN OR)     order for DME     Nutritional Supplements (RENÉE) PACK     estradiol (ESTRACE) 1 MG tablet     diazepam (VALIUM) 5 MG tablet      nitroglycerin (NITROSTAT) 0.4 MG SL tablet     latanoprost (XALATAN) 0.005 % ophthalmic solution     prochlorperazine (COMPAZINE) 10 MG tablet     No current facility-administered medications for this visit.      Facility-Administered Medications Ordered in Other Visits   Medication     neostigmine (PROSTIGMINE) injection     glycopyrrolate (ROBINUL) injection     Pulse 76  Wt 60.3 kg (133 lb)  SpO2 95%  BMI 20.83 kg/m2 No LMP recorded. Patient is not currently having periods (Reason: Amenorrhea). Body mass index is 20.83 kg/(m^2).  She is alert, comfortable in no acute distress, non-labored breathing in her wheelchair    A/P: Sonya Foote is a 67 year old F with extensive vascular disease s/p bilateral AKA and Kari who desires a catheter     We discussed the risks of long term catheter use include but not limited to hematuria, UTI, urethral erosion, squamous cell carcinoma.      At this time given the she has been having Kari that would recommend an office cystoscopy to evaluate the bladder prior to any scheduling any suprapubic tube    Needs a CATHETERIZED urine culture one week prior to the cystoscopy    25 minutes were spent with the patient today, > 50% in counseling and coordination of care    Elizabeth Oliver MD MPH    Urology    CC  Patient Care Team:  Allan Casey MD as PCP - General (Internal Medicine)  Lexii Cardenas, RN as Registered Nurse (Diabetic Education)  Shavon Elam RD as Registered Dietician (Nutrition)  Jamie Sharma, RN as   Ernestine Weldon as Other (see comments)  Michelle Irizarry MD as MD (Internal Medicine)  Savannah Durant MD as MD (Vascular Surgery)  Allan Casey MD as MD (Internal Medicine)  Azael Arriaga MD as Resident (Ophthalmology)  Dayna Ellis NP as Nurse Practitioner (Nurse Practitioner)  Self, Referred, MD as Referring Physician  Janusz Rm MD as MD (Ophthalmology)  Alina Jennings  MD Ying as MD (Pulmonary)  Mello Arroyo MD as MD (Family Medicine - Sports Medicine)  Mary Elliott OT as Occupational Therapist (Occupational Therapy)  Tonie Syed, RN as Nurse Coordinator (Vascular Surgery)  Salome Neri

## 2017-03-22 NOTE — NURSING NOTE
Pt has no legs. Pt lost legs within the last year.  Vascular disease took her legs.  Has a hard time using public bathrooms.  Pt uses portable toilet at home.  Pt lives in assisted living.  Pt wears adult diapers.  Pt has urge problems and leaks.  Pt gets up 6-7 times/nt.  Pt uses cpap and uses a neb 4 times a day.  Pt wants a catheter.  Pt co public bathrooms not easy for her to use.  KATHY Graff, CMA

## 2017-03-22 NOTE — MR AVS SNAPSHOT
After Visit Summary   3/22/2017    Sonya Foote    MRN: [id number]           Patient Information     Date Of Birth          1950        Visit Information        Provider Department      3/22/2017 12:30 PM Elizabeth Oliver MD [facility] [place]         Follow-ups after your visit        Your next 10 appointments already scheduled     Mar 23, 2017  2:40 PM CDT   [facility] Visit with Allan Casey MD   [facility]    [address] [id number] [phone]           Bring a current list of meds and any records pertaining to this visit.  For Physicals, please bring immunization records and any forms needing to be filled out.  Please arrive 10 minutes early to complete paperwork.            Mar 30, 2017 11:15 AM CDT   (Arrive by 11:00 AM)   New Patient Visit with Ky Ramírez MD   [facility] ([facility])    [address] [place] [address]  5th [facility] [address] [phone]            Mar 31, 2017 10:30 AM CDT   Office Visit with [facility] [place] ([facility])    [address]  [address]  [facility] [id number] [phone]           Bring a current list of meds and any records pertaining to this visit.  For Physicals, please bring immunization records and any forms needing to be filled out.  Please arrive 10 minutes early to complete paperwork.            Apr 04, 2017  2:45 PM CDT   Return Visit with Bj Anderson MD   [facility] [place] ([facility])    [address] [place] [address] [address]  [facility] [address]   [phone]            Apr 26, 2017  8:40 AM CDT   (Arrive by 8:25 AM)   NEW HEART FAILURE with Radha Aldrich MD   [facility] ([facility] [place])    [address] [place] [address]  Floor  [facility] [address]   [phone]            May 08, 2017  1:00 PM CDT   Nurse Visit with UA NURSE   [facility] ([facility])    [address]  [address]  [facility] [address] [phone]            May 12, 2017 10:50 AM CDT   (Arrive by 10:35 AM)   RETURN DIABETES with Michelle Irizarry MD   [facility] ([facility])    [address]  3rd Floor  [facility] [address]   [phone]            May 18, 2017  1:00 PM CDT   (Arrive by 12:45 PM)   Cystoscopy with Elizabeth Oliver MD   [facility] ([facility])    [address] [place] [address]  4th [facility] [address]   [phone]            Jul 10, 2017 11:00 AM CDT   (Arrive by 10:45 AM)   Return Visit with Alina Jennings MD   [facility] ([facility])    [address] [place] [address]  3rd [facility] [address]   [phone]              Future tests that were ordered for you today     Open Future Orders        Priority Expected Expires Ordered    UA without Microscopic Routine  3/21/2018 3/21/2017            Who to contact     If you have questions or need follow up information about today's clinic visit or your schedule please contact [facility] directly at [phone].  Normal or non-critical lab and imaging results will be communicated to you by MyChart, letter or phone within 4 business days after the clinic has received the results. If you do not hear from us within 7 days, please contact the clinic through MyChart or phone. If you have a critical or abnormal lab result, we will notify you by phone as soon as possible.  Submit refill requests through [employer] or call your pharmacy and they will forward the refill request to us. Please allow 3  "business days for your refill to be completed.          Additional Information About Your Visit        MyChart Information     [employer] lets you send messages to your doctor, view your test results, renew your prescriptions, schedule appointments and more. To sign up, go to [email or web address] . Click on \"Log in\" on the left side of the screen, which will take you to the Welcome page. Then click on \"Sign up Now\" on the right side of the page.     You will be asked to enter the access code listed below, as well as some personal information. Please follow the directions to create your username and password.     Your access code is: [email or web address]  Expires: 3/26/2017  7:30 AM     Your access code will  in 90 days. If you need help or a new code, please call your [place] clinic or [phone].        Care EveryWhere ID     This is your Care EveryWhere ID. This could be used by other organizations to access your [place] medical records  [id number]        Your Vitals Were     Pulse Pulse Oximetry BMI (Body Mass Index)             76 95% 20.83 kg/m2          Blood Pressure from Last 3 Encounters:   03/15/17 149/81   17 126/68   17 113/62    Weight from Last 3 Encounters:   17 60.3 kg (133 lb)   03/15/17 61.2 kg (135 lb)   17 59 kg (130 lb)              Today, you had the following     No orders found for display         Today's Medication Changes          These changes are accurate as of: 3/22/17  1:17 PM.  If you have any questions, ask your nurse or doctor.               These medicines have changed or have updated prescriptions.        Dose/Directions    atorvastatin 40 MG tablet   Commonly known as:  LIPITOR   This may have changed:  when to take this   Used for:  Hyperlipidemia LDL goal <100        Dose:  40 mg   Take 1 tablet (40 mg) by mouth daily   Quantity:  90 tablet   Refills:  3       clopidogrel 75 MG tablet   Commonly known as:  PLAVIX   This may have changed:  when to take " this   Used for:  PAD (peripheral artery disease) (H), UGI bleed, Osteoporosis, Nausea        Dose:  75 mg   Take 1 tablet (75 mg) by mouth daily   Quantity:  90 tablet   Refills:  3       levETIRAcetam 500 MG tablet   Commonly known as:  KEPPRA   This may have changed:  when to take this   Used for:  Nausea        Dose:  500 mg   Take 1 tablet (500 mg) by mouth 2 times daily   Quantity:  180 tablet   Refills:  1       Phenytoin Sodium Extended 200 MG Caps   This may have changed:  additional instructions   Used for:  Seizure disorder (H)        Dose:  200 mg   Take 200 mg by mouth 2 times daily   Quantity:  60 capsule   Refills:  0                Primary Care Provider Office Phone # Fax #    Allan Casey [phone]       [place] [address] [place] [id number]        Thank you!     Thank you for choosing [facility] [place]  for your care. Our goal is always to provide you with excellent care. Hearing back from our patients is one way we can continue to improve our services. Please take a few minutes to complete the written survey that you may receive in the mail after your visit with us. Thank you!             Your Updated Medication List - Protect others around you: Learn how to safely use, store and throw away your medicines at www.disposemymeds.org.          This list is accurate as of: 3/22/17  1:17 PM.  Always use your most recent med list.                   Brand Name Dispense Instructions for use    ADVAIR DISKUS 250-50 MCG/DOSE diskus inhaler   Generic drug:  fluticasone-salmeterol      Inhale 2 puffs into the lungs daily       albuterol (2.5 MG/3ML) 0.083% neb solution     360 mL    INHALE 1 VIAL VIA NEBULIZER EVERY 6 HOURS AS NEEDED       aspirin 81 MG EC tablet     90 tablet    Take 1 tablet (81 mg) by mouth daily       atorvastatin 40 MG tablet    LIPITOR    90 tablet    Take 1 tablet (40 mg) by mouth daily       blood glucose  monitoring meter device kit     1 kit    Use to test blood sugars 3 times daily or as directed.       blood glucose monitoring test strip    ONE TOUCH ULTRA    3 Box    Use to test blood sugars 3 times daily or as directed.       calcium citrate-vitamin D 315-250 MG-UNIT Tabs per tablet    CITRACAL    120 tablet    Take 2 tablets by mouth daily       cetirizine 10 MG tablet    zyrTEC    90 tablet    Take 1 tablet (10 mg) by mouth every evening       clopidogrel 75 MG tablet    PLAVIX    90 tablet    Take 1 tablet (75 mg) by mouth daily       CYANOCOBALAMIN PO      Take 2,000 mcg by mouth daily       diazepam 5 MG tablet    VALIUM    20 tablet    Take 1 tablet (5 mg) by mouth every 6 hours as needed for anxiety       DOCUSATE SODIUM PO      Take 100 mg by mouth daily       dorzolamide 2 % ophthalmic solution    TRUSOPT     Place 1 drop into both eyes 2 times daily       * EASY TOUCH LANCETS 30G/TWIST Misc          * thin lancets    NO BRAND SPECIFIED    1 Box    Use to test blood sugar 3 times daily or as directed.       * blood glucose monitoring lancets     3 Box    Use to test blood sugar 3 times daily or as directed.       escitalopram 20 MG tablet    LEXAPRO    90 tablet    One per day       estradiol 1 MG tablet    ESTRACE    90 tablet    Take 1 tablet (1 mg) by mouth daily       famotidine 20 MG tablet    PEPCID    60 tablet    Take 1 tablet (20 mg) by mouth 2 times daily       ferrous sulfate 325 (65 FE) MG tablet    IRON    60 tablet    Take 1 tablet (325 mg) by mouth 2 times daily With meals       FLOMAX 0.4 MG capsule   Generic drug:  tamsulosin      Take 0.4 mg by mouth At Bedtime       fluticasone 50 MCG/ACT spray    FLONASE     Spray 1 spray into both nostrils daily       gabapentin 300 MG capsule    NEURONTIN    90 capsule    Take 1 capsule (300 mg) by mouth 3 times daily       hydrochlorothiazide 12.5 MG capsule    MICROZIDE    90 capsule    Take 1 capsule (12.5 mg) by mouth daily       HYDROmorphone  2 MG tablet    DILAUDID    30 tablet    Take 1 tablet (2 mg) by mouth every 3 hours as needed for moderate to severe pain       RENÉE Pack      Take 1 packet by mouth 2 times daily       lactulose 10 GM/15ML solution    CHRONULAC    946 mL    Take 30 mLs (20 g) by mouth daily as needed for constipation       latanoprost 0.005 % ophthalmic solution    XALATAN    2.5 mL    Place 1 drop into both eyes At Bedtime       levETIRAcetam 500 MG tablet    KEPPRA    180 tablet    Take 1 tablet (500 mg) by mouth 2 times daily       lidocaine 5 % Patch    LIDODERM    30 patch    Apply from 1 to 3 patches to painful area at once for up to 12 h within a 24 h period.  Remove after 12 hours.       lisinopril 2.5 MG tablet    PRINIVIL/Zestril    90 tablet    Take 1 tablet (2.5 mg) by mouth daily       MEDICATION GIVEN BY INTRATHECAL PUMP - INSTRUCTION      continuous 4/11/2016 per [facility] [place] [phone]: Conc: Bupivacaine 20 mg/mL and Fentanyl 2000 mcg/mL. Continuous: Bupivacaine 5.052 mg/day and Fentanyl 505.2 mcg/day. Boluses: Up to 7 boluses per 24-hr period of Bupivicaine 0.5992 mg and Fentanyl 59.9 mcg Max daily dose: Bupivacaine 9.1973 mg/day and Fentanyl 919.5883 mcg/day  Pump Last Fill Date:  6/30/2016 Pump Refill Date: 9/20/2016       metoclopramide 5 MG tablet    REGLAN    240 tablet    Take 1 tablet (5 mg) by mouth 3 times daily (before meals)       metoprolol 25 MG 24 hr tablet    TOPROL-XL    30 tablet    Take 1 tablet (25 mg) by mouth daily       MULTIVITAMIN PO      Take 1 tablet by mouth daily       nitroglycerin 0.4 MG sublingual tablet    NITROSTAT    25 tablet    Place 1 tablet (0.4 mg) under the tongue every 5 minutes as needed for chest pain if you are still having symptoms after 3 doses (15 minutes) call 911.       ondansetron 4 MG ODT tab    ZOFRAN-ODT    120 tablet    Take 1 tablet (4 mg) by mouth every 6 hours       * order for DME     1 Month    Equipment being  ordered: Depends briefs       * order for DME     1 Device    Equipment being ordered: Power Wheelchair       * order for DME     1 Units    Equipment being ordered: Nebulizer and supplies       * order for DME     1 Box    Equipment being ordered: Glucerna daily shakes with each meal       * order for DME     1 Device    ACcu check BID       * order for DME     1 Units    Equipment being ordered: Nebulizer and tubing supplies       * order for DME     1 Device    Equipment being ordered: CPAP supplies mask, hose, filters, cushion fax to [facility] at [phone] Disp: 10 DeviceRefills: prn Class: Local PrintStart: 2/10/2017       * order for DME     10 Device    Equipment being ordered: CPAP supplies mask, hose, filters, cushion  fax to [facility] at [phone]       * order for DME     1 Device    Equipment being ordered: wheelchair seat cushion       oxyCODONE-acetaminophen 5-325 MG per tablet    PERCOCET    60 tablet    Take 1-2 tablets every 3 hours as needed for pain.       pantoprazole 40 MG EC tablet    PROTONIX    60 tablet    Take 1 tablet (40 mg) by mouth 2 times daily Then QD       Phenytoin Sodium Extended 200 MG Caps     60 capsule    Take 200 mg by mouth 2 times daily       polyethylene glycol powder    MIRALAX/GLYCOLAX     Take 17 g by mouth daily       pregabalin 75 MG capsule    LYRICA    90 capsule    Take 1 capsule (75 mg) by mouth 2 times daily (takes at 8 AM and 8 PM)       prochlorperazine 10 MG tablet    COMPAZINE    20 tablet    Take 1 tablet (10 mg) by mouth every 8 hours as needed       risperiDONE 1 MG tablet    risperDAL    90 tablet    Take 0.5 tablets (0.5 mg) by mouth At Bedtime       rOPINIRole 0.25 MG tablet    REQUIP    270 tablet    Take 3 tablets (0.75 mg) by mouth At Bedtime       SPIRIVA HANDIHALER 18 MCG capsule   Generic drug:  tiotropium     30 capsule    INHALE THE CONTENTS OF 1 CAPSULE VIA INHALATION DEVICE DAILY       * sucralfate 1 GM/10ML suspension     CARAFATE    1200 mL    Take 10 mLs (1 g) by mouth 4 times daily (before meals and nightly)       * sucralfate 1 GM tablet    CARAFATE    40 tablet    Take 1 tablet (1 g) by mouth 4 times daily May dissolve in 10 mL water is necessary. (Start upon completion of carafate suspension)       traZODone 100 MG tablet    DESYREL    90 tablet    Take 1 tablet (100 mg) by mouth At Bedtime       vitamin D 2000 UNITS tablet     100 tablet    Take 2,000 Units by mouth daily.       zinc 50 MG Tabs      Take 1 tablet by mouth daily       * Notice:  This list has 14 medication(s) that are the same as other medications prescribed for you. Read the directions carefully, and ask your doctor or other care provider to review them with you.

## 2017-03-22 NOTE — PROGRESS NOTES
"March 22, 2017    Referring Provider/Primary Care Provider: Allan Casey    CC: \"I want a catheter\"    HPI:  Sonya Foote is a 67 year old female who presents for evaluation of her pelvic floor symptoms. Patient states that she was \"born with both parts,\" decision was made when she was a child to \"make her a girl\" where she states that the penis was removed and at 12 yo had surgery where a neovagina was created.  Although she feels she may have always identified as male she lives as a female. Also reports that she does have a prostate, PSAs have been normal.  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  She states that she is here because since her amputation the only toilets that are accessible to her is the one in her home. She states that at rehab they essentially told her that she would just need to wear diapers when she left the house.  She also does have UUI-taking flomax, tried anticholinergics in the past.  Also has had Kari with a couple episodes of pyelonephritis as well, last episode in December    Past Medical History:   Diagnosis Date     Anemia      Antiplatelet or antithrombotic long-term use      Arthritis      AS (sickle cell trait) (H) 10/8/2013     Blind left eye      BPPV (benign paroxysmal positional vertigo)      Chronic back pain      COPD (chronic obstructive pulmonary disease) (H)      Coronary artery disease      CVA (cerebral infarction) 10/8/2012    x3, residual left sided weakness     Diastolic CHF (H) 9/4/2012     Dilantin toxicity 5/31/2013     Esophageal candidiasis (H)      GERD (gastroesophageal reflux disease)      Heart attack (H)      Hernia, abdominal      History of blood transfusion     x5     History of thrombophlebitis      HTN (hypertension) 9/4/2012     Hyperlipidemia LDL goal <100 9/4/2012     Legally blind in right eye, as defined in [place]     No periphal vision right eye     Osteoporosis 1/21/2013     Other chronic pain      PAD (peripheral " artery disease) (H)      Palpitations      Person who has had sex change operation 1/20/2014     Pneumonia      Schizoaffective disorder (H)      Seizure disorder (H) 9/4/2012     Seizures (H)      Sleep apnea     CPAP     Thrombosis of leg      Type 2 diabetes, HbA1C goal < 8% (H) 9/4/2012     Uncomplicated asthma      Unspecified cerebral artery occlusion with cerebral infarction      Past Surgical History:   Procedure Laterality Date     AMPUTATE LEG ABOVE KNEE Left 6/11/2016    Procedure: AMPUTATE LEG ABOVE KNEE;  Surgeon: Mello Rodriguez MD;  [facility]     AMPUTATE LEG BELOW KNEE Right 11/7/2016    Procedure: AMPUTATE LEG BELOW KNEE;  Surgeon: Savannah Durant MD;  [facility]     AMPUTATE REVISION STUMP LOWER EXTREMITY Right 11/11/2016    Procedure: AMPUTATE REVISION STUMP LOWER EXTREMITY;  Surgeon: Savannah Durant MD;  [facility]     AMPUTATE REVISION STUMP LOWER EXTREMITY Right 11/16/2016    Procedure: AMPUTATE REVISION STUMP LOWER EXTREMITY;  Surgeon: Savannah Durant MD;  [facility]     AMPUTATE TOE(S) Right 1/5/2016    Procedure: AMPUTATE TOE(S);  Surgeon: Mello Gaines DPM;  Location:  [facility]     ANGIOGRAM Bilateral 11/21/2014    Procedure: ANGIOGRAM;  Surgeon: Savannah Durant MD;  [facility]     ANGIOGRAM Left 1/16/2015    Procedure: ANGIOGRAM;  Surgeon: Savannah Durant MD;  [facility]     ANGIOGRAM Bilateral 9/14/2015    Procedure: ANGIOGRAM;  Surgeon: Savannah Durant MD;  [facility]     ANGIOGRAM Left 10/12/2015    Procedure: ANGIOGRAM;  Surgeon: Savannah Durant MD;  [facility]     ANGIOGRAM Right 6/6/2016    Procedure: ANGIOGRAM;  Surgeon: Savannah Durant MD;  [facility]     ANGIOPLASTY Right 6/6/2016    Procedure: ANGIOPLASTY;  Surgeon: Savannah Durant MD;  [facility]     APPENDECTOMY       BREAST SURGERY      right breast bx (benign)     CHOLECYSTECTOMY       COLONOSCOPY N/A 8/25/2014    Procedure: COLONOSCOPY;  Surgeon: Mello Ferrer MD;  [facility]      COLONOSCOPY WITH CO2 INSUFFLATION N/A 8/20/2014    Procedure: COLONOSCOPY WITH CO2 INSUFFLATION;  Surgeon: Duane, William Charles, MD;  [facility]     ENDARTERECTOMY FEMORAL  5/23/2014    Procedure: ENDARTERECTOMY FEMORAL;  Surgeon: Jason Joshi MD;  [facility]     ESOPHAGOSCOPY, GASTROSCOPY, DUODENOSCOPY (EGD), COMBINED  12/14/2012    Procedure: COMBINED ESOPHAGOSCOPY, GASTROSCOPY, DUODENOSCOPY (EGD), BIOPSY SINGLE OR MULTIPLE;  ESOPHAGOSCOPY, GASTROSCOPY, DUODENOSCOPY (EGD), DILATATION ;  Surgeon: Elizabeth Stevenson MD;  Location:  [facility]     ESOPHAGOSCOPY, GASTROSCOPY, DUODENOSCOPY (EGD), COMBINED  12/31/2013    Procedure: COMBINED ESOPHAGOSCOPY, GASTROSCOPY, DUODENOSCOPY (EGD), BIOPSY SINGLE OR MULTIPLE;;  Surgeon: Clemente Lopez MD;  Location:  [facility]     ESOPHAGOSCOPY, GASTROSCOPY, DUODENOSCOPY (EGD), COMBINED  4/1/2014    Procedure: COMBINED ESOPHAGOSCOPY, GASTROSCOPY, DUODENOSCOPY (EGD);;  Surgeon: Clemente Lopez MD;  Location:  [facility]     ESOPHAGOSCOPY, GASTROSCOPY, DUODENOSCOPY (EGD), COMBINED  6/28/2014    Procedure: COMBINED ESOPHAGOSCOPY, GASTROSCOPY, DUODENOSCOPY (EGD);  Surgeon: Clemente Lopez MD;  Location:  [facility]     ESOPHAGOSCOPY, GASTROSCOPY, DUODENOSCOPY (EGD), COMBINED N/A 8/20/2014    Procedure: COMBINED ESOPHAGOSCOPY, GASTROSCOPY, DUODENOSCOPY (EGD), BIOPSY SINGLE OR MULTIPLE;  Surgeon: Duane, William Charles, MD;  Location:  OR     ESOPHAGOSCOPY, GASTROSCOPY, DUODENOSCOPY (EGD), COMBINED N/A 8/22/2014    Procedure: COMBINED ESOPHAGOSCOPY, GASTROSCOPY, DUODENOSCOPY (EGD), BIOPSY SINGLE OR MULTIPLE;  Surgeon: Mello Ferrer MD;  [facility]     ESOPHAGOSCOPY, GASTROSCOPY, DUODENOSCOPY (EGD), COMBINED N/A 10/2/2014    Procedure: COMBINED ESOPHAGOSCOPY, GASTROSCOPY, DUODENOSCOPY (EGD), BIOPSY SINGLE OR MULTIPLE;  Surgeon: Remy Haskins MD;  Location:  [facility]     ESOPHAGOSCOPY, GASTROSCOPY, DUODENOSCOPY (EGD), COMBINED Left 12/15/2014    Procedure: COMBINED  ESOPHAGOSCOPY, GASTROSCOPY, DUODENOSCOPY (EGD), BIOPSY SINGLE OR MULTIPLE;  Surgeon: Remy Haskins MD;  [facility]     ESOPHAGOSCOPY, GASTROSCOPY, DUODENOSCOPY (EGD), COMBINED N/A 2/25/2015    Procedure: COMBINED ENDOSCOPIC ULTRASOUND, ESOPHAGOSCOPY, GASTROSCOPY, DUODENOSCOPY (EGD), FINE NEEDLE ASPIRATE/BIOPSY;  Surgeon: Clemente Lugo MD;  [facility]     ESOPHAGOSCOPY, GASTROSCOPY, DUODENOSCOPY (EGD), COMBINED Left 2/25/2015    Procedure: COMBINED ESOPHAGOSCOPY, GASTROSCOPY, DUODENOSCOPY (EGD), BIOPSY SINGLE OR MULTIPLE;  Surgeon: Clemente Lugo MD;  [facility]     ESOPHAGOSCOPY, GASTROSCOPY, DUODENOSCOPY (EGD), COMBINED N/A 9/25/2016    Procedure: COMBINED ESOPHAGOSCOPY, GASTROSCOPY, DUODENOSCOPY (EGD);  Surgeon: Aziza Patiño MD;  [facility]     ESOPHAGOSCOPY, GASTROSCOPY, DUODENOSCOPY (EGD), COMBINED N/A 1/18/2017    Procedure: COMBINED ESOPHAGOSCOPY, GASTROSCOPY, DUODENOSCOPY (EGD), BIOPSY SINGLE OR MULTIPLE;  Surgeon: Clemente Lopez MD;  [facility]     FASCIOTOMY LOWER EXTREMITY Left 6/10/2016    Procedure: FASCIOTOMY LOWER EXTREMITY;  Surgeon: Mello Rodriguez MD;  [facility]     HC CAPSULE ENDOSCOPY N/A 8/25/2014    Procedure: CAPSULE/PILL CAM ENDOSCOPY;  Surgeon: Remy Haskins MD;  [facility]     HC CAPSULE ENDOSCOPY N/A 10/2/2014    Procedure: CAPSULE/PILL CAM ENDOSCOPY;  Surgeon: Remy Haskins MD;  [facility]     ORTHOPEDIC SURGERY      broken wrist repair     SEX TRANSFORMATION SURGERY, MALE TO FEMALE      1974     SINUS SURGERY      cyst removed     TONSILLECTOMY       VASCULAR SURGERY      Left carotid stent     Social History     Social History     Marital status:      Spouse name: Jayde     Number of children: 2     Years of education: N/A     Occupational History     mental health worker      retired     Social History Main Topics     Smoking status: Former Smoker     Packs/day: 2.50     Years: 30.00      "Types: Cigarettes, Cigars     Quit date: 11/1/2000     Smokeless tobacco: Never Used     Alcohol use No     Drug use: No     Sexual activity: Yes     Other Topics Concern     Caffeine Concern No     1 in the morning     Social History Narrative    The patient is a former smoker.  She smoked 2.5 packs per year for approximately 30 years.  She quit in 2000 using Wellbutrin and patches.  Her father was in the  and she moved around a lot when she was a kid, and has lived abroad including in [facility] and the [facility].  She was previously employed as a mental health worker. Moved from [place] to [place] in 2012. She is currently living in a senior living apartment, and describes her relationship status as a \" demarco couple.\"  She manages her own medications.  She has no recent travel and no known TB exposures.       Family History   Problem Relation Age of Onset     Dementia Mother      Glaucoma Mother      DIABETES Mother      Coronary Artery Disease Mother      MI     Glaucoma Father      DIABETES Father      Heart Failure Father      CANCER Maternal Aunt      leukemia     Schizophrenia Brother      Depression Brother      Suicide Sister      Depression Sister      DIABETES Sister      CANCER Maternal Aunt      ovarian     Glaucoma Maternal Grandmother      DIABETES Maternal Grandmother      Glaucoma Maternal Grandfather      DIABETES Maternal Grandfather      Glaucoma Paternal Grandmother      DIABETES Paternal Grandmother      Glaucoma Paternal Grandfather      DIABETES Paternal Grandfather      Breast Cancer Sister      CEREBROVASCULAR DISEASE Brother        Review of Systems     Constitutional:  Negative for fever, chills, weight loss, weight gain, fatigue, decreased appetite, night sweats, recent stressors, height gain, height loss, post-operative complications, incisional pain, hallucinations, increased energy, hyperactivity and confused.   HENT:  Positive for hearing loss, nosebleeds, trouble " swallowing, hoarse voice, mouth sores, hearing aid, dry mouth and sinus congestion. Negative for ear pain, tinnitus, sore throat, ear discharge, tooth pain, gum tenderness, taste disturbance, smell disturbance, bleeding gums, sinus pain and neck mass.    Eyes:  Positive for double vision, pain, eye pain, decreased vision, spots, floaters, tunnel vision and eye irritation. Negative for redness, eye watering, eye bulging, eye dryness, flashing lights, strabismus and jaundice.   Respiratory:   Positive for cough, sputum production, shortness of breath, wheezing, sleep disturbances due to breathing, snores loudly, dyspnea on exertion and cough disturbing sleep. Negative for hemoptysis, respiratory pain and postural dyspnea.    Cardiovascular:  Positive for dyspnea on exertion, palpitations, orthopnea, hypertension, hypotension, chest pain on exertion, sleep disturbances due to breathing and tachycardia. Negative for chest pain, claudication, leg swelling, fingers/toes turn blue, syncope, history of heart murmur, chest pain at rest, pacemaker, few scattered varicosities, leg pain, light-headedness, exercise intolerance and edema.   Gastrointestinal:  Negative for heartburn, nausea, vomiting, abdominal pain, diarrhea, constipation, blood in stool, melena, rectal pain, bloating, hemorrhoids, bowel incontinence, jaundice, rectal bleeding, coffee ground emesis and change in stool.   Genitourinary:  Positive for bladder incontinence, urgency, difficulty urinating and nocturia. Negative for dysuria, hematuria, flank pain, vaginal discharge, genital sores, dyspareunia, decreased libido, voiding less frequently, arousal difficulty, abnormal vaginal bleeding, excessive menstruation, menstrual changes, hot flashes, vaginal dryness and postmenopausal bleeding.   Musculoskeletal:  Negative for myalgias, back pain, joint swelling, arthralgias, stiffness, muscle cramps, neck pain, bone pain, muscle weakness and fracture.   Skin:   Negative for nail changes, itching, poor wound healing, rash, hair changes, skin changes, acne, warts, poor wound healing, scarring, flaky skin, Raynaud's phenomenon, sensitivity to sunlight and skin thickening.   Neurological:  Positive for dizziness, tingling, tremors, speech change, seizures, loss of consciousness, weakness, numbness, headaches, memory loss and difficulty walking. Negative for light-headedness and disturbances in coordination.   Endo/Heme:  Positive for anemia and bruises/bleeds easily. Negative for swollen glands.   Psychiatric/Behavioral:  Positive for memory loss. Negative for depression, hallucinations, decreased concentration, mood swings and panic attacks.    Breast:  Negative for breast discharge, breast mass, breast pain and nipple retraction.   Endocrine:  Negative for altered temperature regulation, polyphagia, polydipsia, unwanted hair growth and change in facial hair.    Allergies   Allergen Reactions     Bee Venom      Penicillins Anaphylaxis     Other reaction(s): Skin Rash and/or Hives     Dilantin [Phenytoin] Other (See Comments)     Generic dilantin only per pt     Iodide Hives     09/11/15: Pt states she can use iodine and doesn't have any problems      Iodine-131      Novocaine [Procaine] Hives     Other reaction(s): Skin Rash and/or Hives     Current Outpatient Prescriptions   Medication     lidocaine (LIDODERM) 5 % Patch     blood glucose monitoring (ONE TOUCH ULTRA) test strip     order for DME     order for DME     famotidine (PEPCID) 20 MG tablet     sucralfate (CARAFATE) 1 GM tablet     pantoprazole (PROTONIX) 40 MG EC tablet     order for DME     oxyCODONE-acetaminophen (PERCOCET) 5-325 MG per tablet     albuterol (2.5 MG/3ML) 0.083% neb solution     sucralfate (CARAFATE) 1 GM/10ML suspension     order for DME     ondansetron (ZOFRAN-ODT) 4 MG ODT tab     metoprolol (TOPROL-XL) 25 MG 24 hr tablet     metoclopramide (REGLAN) 5 MG tablet     CYANOCOBALAMIN PO      gabapentin (NEURONTIN) 300 MG capsule     polyethylene glycol (MIRALAX/GLYCOLAX) powder     HYDROmorphone (DILAUDID) 2 MG tablet     fluticasone (FLONASE) 50 MCG/ACT nasal spray     ADVAIR DISKUS 250-50 MCG/DOSE diskus inhaler     calcium citrate-vitamin D (CITRACAL) 315-250 MG-UNIT TABS     pregabalin (LYRICA) 75 MG capsule     ferrous sulfate (IRON) 325 (65 FE) MG tablet     hydrochlorothiazide (MICROZIDE) 12.5 MG capsule     lisinopril (PRINIVIL,ZESTRIL) 2.5 MG tablet     escitalopram (LEXAPRO) 20 MG tablet     levETIRAcetam (KEPPRA) 500 MG tablet     traZODone (DESYREL) 100 MG tablet     rOPINIRole (REQUIP) 0.25 MG tablet     aspirin EC 81 MG EC tablet     clopidogrel (PLAVIX) 75 MG tablet     risperiDONE (RISPERDAL) 1 MG tablet     blood glucose monitoring (ONE TOUCH ULTRA 2) meter device kit     blood glucose monitoring (ONE TOUCH ULTRASOFT) lancets     phenytoin 200 MG CAPS     DOCUSATE SODIUM PO     tamsulosin (FLOMAX) 0.4 MG 24 hr capsule     dorzolamide (TRUSOPT) 2 % ophthalmic solution     SPIRIVA HANDIHALER 18 MCG inhalation capsule     zinc 50 MG TABS     cetirizine (ZYRTEC) 10 MG tablet     MEDICATION GIVEN BY INTRATHECAL PUMP - INSTRUCTION     atorvastatin (LIPITOR) 40 MG tablet     lactulose (CHRONULAC) 10 GM/15ML solution     order for DME     thin (NO BRAND SPECIFIED) lancets     ORDER FOR DME     ORDER FOR DME     ORDER FOR DME     EASY TOUCH LANCETS 30G/TWIST MISC     Cholecalciferol (VITAMIN D) 2000 UNITS tablet     Multiple Vitamin (MULTIVITAMIN OR)     order for DME     Nutritional Supplements (RENÉE) PACK     estradiol (ESTRACE) 1 MG tablet     diazepam (VALIUM) 5 MG tablet     nitroglycerin (NITROSTAT) 0.4 MG SL tablet     latanoprost (XALATAN) 0.005 % ophthalmic solution     prochlorperazine (COMPAZINE) 10 MG tablet     No current facility-administered medications for this visit.      Facility-Administered Medications Ordered in Other Visits   Medication     neostigmine (PROSTIGMINE)  injection     glycopyrrolate (ROBINUL) injection     Pulse 76  Wt 60.3 kg (133 lb)  SpO2 95%  BMI 20.83 kg/m2 No LMP recorded. Patient is not currently having periods (Reason: Amenorrhea). Body mass index is 20.83 kg/(m^2).  She is alert, comfortable in no acute distress, non-labored breathing in her wheelchair    A/P: Sonya Foote is a 67 year old F with extensive vascular disease s/p bilateral AKA and Kari who desires a catheter     We discussed the risks of long term catheter use include but not limited to hematuria, UTI, urethral erosion, squamous cell carcinoma.      At this time given the she has been having Kari that would recommend an office cystoscopy to evaluate the bladder prior to any scheduling any suprapubic tube    Needs a CATHETERIZED urine culture one week prior to the cystoscopy    25 minutes were spent with the patient today, > 50% in counseling and coordination of care    Elizabeth Oliver MD MPH    Urology    CC  Patient Care Team:  Allan Casey MD as PCP - General (Internal Medicine)  Lexii Cardenas, RN as Registered Nurse (Diabetic Education)  Shavon Elam RD as Registered Dietician (Nutrition)  Jamie Sharma RN as   Ernestine Weldon as Other (see comments)  Michelle Irizarry MD as MD (Internal Medicine)  Savannah Durant MD as MD (Vascular Surgery)  Allan Casey MD as MD (Internal Medicine)  Azael Arriaga MD as Resident (Ophthalmology)  Dayna Ellis NP as Nurse Practitioner (Nurse Practitioner)  Self, Referred, MD as Referring Physician  Janusz Rm MD as MD (Ophthalmology)  Alina Jennings MD as MD (Pulmonary)  Mello Arroyo MD as MD (Family Medicine - Sports Medicine)  Mary Elliott OT as Occupational Therapist (Occupational Therapy)  Tonie Syed, RN as Nurse Coordinator (Vascular Surgery)  Salome Neri                Answers for HPI/ROS submitted by the patient on  3/22/2017   Heart flutters: No

## 2017-03-23 ENCOUNTER — OFFICE VISIT (OUTPATIENT)
Dept: INTERNAL MEDICINE | Facility: CLINIC | Age: 67
End: 2017-03-23
Payer: COMMERCIAL

## 2017-03-23 VITALS
OXYGEN SATURATION: 98 % | DIASTOLIC BLOOD PRESSURE: 70 MMHG | TEMPERATURE: 98.4 F | HEART RATE: 96 BPM | SYSTOLIC BLOOD PRESSURE: 138 MMHG

## 2017-03-23 DIAGNOSIS — D50.9 IRON DEFICIENCY ANEMIA, UNSPECIFIED IRON DEFICIENCY ANEMIA TYPE: ICD-10-CM

## 2017-03-23 DIAGNOSIS — Z89.511 STATUS POST BELOW KNEE AMPUTATION OF RIGHT LOWER EXTREMITY (H): ICD-10-CM

## 2017-03-23 DIAGNOSIS — S40.029A: Primary | ICD-10-CM

## 2017-03-23 LAB — HGB BLD-MCNC: 11.1 G/DL (ref 11.7–15.7)

## 2017-03-23 PROCEDURE — 99213 OFFICE O/P EST LOW 20 MIN: CPT | Performed by: INTERNAL MEDICINE

## 2017-03-23 PROCEDURE — 85018 HEMOGLOBIN: CPT | Performed by: INTERNAL MEDICINE

## 2017-03-23 PROCEDURE — 36415 COLL VENOUS BLD VENIPUNCTURE: CPT | Performed by: INTERNAL MEDICINE

## 2017-03-23 RX ORDER — FERROUS SULFATE 325(65) MG
325 TABLET ORAL 2 TIMES DAILY
Qty: 60 TABLET | Refills: 2 | Status: SHIPPED | OUTPATIENT
Start: 2017-03-23 | End: 2020-07-30

## 2017-03-23 NOTE — NURSING NOTE
"Chief Complaint   Patient presents with     Hypertension       Initial /70 (BP Location: Left arm, Patient Position: Chair, Cuff Size: Adult Large)  Pulse 96  Temp 98.4  F (36.9  C) (Oral)  SpO2 98% Estimated body mass index is 20.83 kg/(m^2) as calculated from the following:    Height as of 1/9/17: 5' 7\" (1.702 m).    Weight as of 3/22/17: 133 lb (60.3 kg).  Medication Reconciliation: complete    "

## 2017-03-23 NOTE — PROGRESS NOTES
SUBJECTIVE:                                                    Sonya Foote is a 67 year old female who presents to clinic today for the following health issues:    Bruising        Duration: 3-4 weeks    Description (location/character/radiation): Random bruising through out body     Intensity:  moderate    Accompanying signs and symptoms: Tender     History (similar episodes/previous evaluation): Yes, blood thinners     Precipitating or alleviating factors: None    Therapies tried and outcome: None       Problem list and histories reviewed & adjusted, as indicated.  Additional history: as documented    Patient Active Problem List   Diagnosis     Hyperlipidemia LDL goal <100     Seizure disorder (H)     ACP (advance care planning)     Osteoporosis     Schizoaffective disorder (H)     AS (sickle cell trait) (H)     Vertigo     Person who has had sex change operation     Claudication in peripheral vascular disease (H)     Intestinal malabsorption     GIB (gastrointestinal bleeding)     Cervicalgia     Health Care Home     Asthma     Adjustment disorder with depressed mood     Chronic pain syndrome     Open-angle glaucoma     History of colonic polyps     Old myocardial infarction     Iron deficiency anemia     Late effects of cerebrovascular disease     Degeneration of intervertebral disc of lumbosacral region     Thoracic or lumbosacral neuritis or radiculitis     Cerebral infarction due to occlusion or stenosis of carotid artery     Disorder of bone and cartilage     Hereditary and idiopathic peripheral neuropathy     Androgen insensitivity syndrome     PAD (peripheral artery disease) (H)     Chronic systolic congestive heart failure (H)     Carotid stenosis, left     Primary osteoarthritis of both hands     Pain in both upper extremities     Atherosclerotic peripheral vascular disease with rest pain (H)     Essential hypertension with goal blood pressure less than 130/80     Cellulitis of right ankle     Angina  pectoris, crescendo (H)     Type 2 diabetes mellitus with diabetic peripheral angiopathy without gangrene, without long-term current use of insulin (H)     Anemia, unspecified type     Critical lower limb ischemia     Testicular feminization     Anxiety disorder due to general medical condition     Anxiety disorder     Central retinal artery occlusion     Lumbosacral radiculitis     Peripheral nerve disease (H)     Osteopenia     Prediabetes     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 (H)     Primary open angle glaucoma of both eyes, severe stage     Pseudophakia of right eye     Cataract, left eye     Diabetes mellitus type 2 without retinopathy (H)     Pyelonephritis     Gastroesophageal reflux disease without esophagitis     Chronic obstructive pulmonary disease, unspecified COPD type (H)     JOSE (obstructive sleep apnea)     Complex sleep apnea syndrome     Past Surgical History:   Procedure Laterality Date     AMPUTATE LEG ABOVE KNEE Left 6/11/2016    Procedure: AMPUTATE LEG ABOVE KNEE;  Surgeon: Mello Rodriguez MD;  [facility]     AMPUTATE LEG BELOW KNEE Right 11/7/2016    Procedure: AMPUTATE LEG BELOW KNEE;  Surgeon: Savannah Durant MD;  [facility]     AMPUTATE REVISION STUMP LOWER EXTREMITY Right 11/11/2016    Procedure: AMPUTATE REVISION STUMP LOWER EXTREMITY;  Surgeon: Savannah Durant MD;  [facility]     AMPUTATE REVISION STUMP LOWER EXTREMITY Right 11/16/2016    Procedure: AMPUTATE REVISION STUMP LOWER EXTREMITY;  Surgeon: Savannah Durant MD;  [facility]     AMPUTATE TOE(S) Right 1/5/2016    Procedure: AMPUTATE TOE(S);  Surgeon: Mello Gaines DPM;  Location: [facility]     ANGIOGRAM Bilateral 11/21/2014    Procedure: ANGIOGRAM;  Surgeon: Savannah Durant MD;  [facility]     ANGIOGRAM Left 1/16/2015    Procedure: ANGIOGRAM;  Surgeon: Savannah Durant MD;  [facility]     ANGIOGRAM Bilateral 9/14/2015    Procedure: ANGIOGRAM;  Surgeon: Savannah Durant MD;  Location:   OR     ANGIOGRAM Left 10/12/2015    Procedure: ANGIOGRAM;  Surgeon: Savannah Durant MD;  [facility]     ANGIOGRAM Right 6/6/2016    Procedure: ANGIOGRAM;  Surgeon: Savannah Durant MD;  [facility]     ANGIOPLASTY Right 6/6/2016    Procedure: ANGIOPLASTY;  Surgeon: Savannah Durant MD;  [facility]     APPENDECTOMY       BREAST SURGERY      right breast bx (benign)     CHOLECYSTECTOMY       COLONOSCOPY N/A 8/25/2014    Procedure: COLONOSCOPY;  Surgeon: Mello Ferrer MD;  [facility]     COLONOSCOPY WITH CO2 INSUFFLATION N/A 8/20/2014    Procedure: COLONOSCOPY WITH CO2 INSUFFLATION;  Surgeon: Duane, William Charles, MD;  [facility]     ENDARTERECTOMY FEMORAL  5/23/2014    Procedure: ENDARTERECTOMY FEMORAL;  Surgeon: Jason Joshi MD;  [facility]     ESOPHAGOSCOPY, GASTROSCOPY, DUODENOSCOPY (EGD), COMBINED  12/14/2012    Procedure: COMBINED ESOPHAGOSCOPY, GASTROSCOPY, DUODENOSCOPY (EGD), BIOPSY SINGLE OR MULTIPLE;  ESOPHAGOSCOPY, GASTROSCOPY, DUODENOSCOPY (EGD), DILATATION ;  Surgeon: Elizabeth Stevenson MD;  Location:  GI     ESOPHAGOSCOPY, GASTROSCOPY, DUODENOSCOPY (EGD), COMBINED  12/31/2013    Procedure: COMBINED ESOPHAGOSCOPY, GASTROSCOPY, DUODENOSCOPY (EGD), BIOPSY SINGLE OR MULTIPLE;;  Surgeon: Clemente Lopez MD;  Location:  GI     ESOPHAGOSCOPY, GASTROSCOPY, DUODENOSCOPY (EGD), COMBINED  4/1/2014    Procedure: COMBINED ESOPHAGOSCOPY, GASTROSCOPY, DUODENOSCOPY (EGD);;  Surgeon: Clemente Lopez MD;  Location:  GI     ESOPHAGOSCOPY, GASTROSCOPY, DUODENOSCOPY (EGD), COMBINED  6/28/2014    Procedure: COMBINED ESOPHAGOSCOPY, GASTROSCOPY, DUODENOSCOPY (EGD);  Surgeon: Clemente Lopez MD;  Location:  GI     ESOPHAGOSCOPY, GASTROSCOPY, DUODENOSCOPY (EGD), COMBINED N/A 8/20/2014    Procedure: COMBINED ESOPHAGOSCOPY, GASTROSCOPY, DUODENOSCOPY (EGD), BIOPSY SINGLE OR MULTIPLE;  Surgeon: Duane, William Charles, MD;  [facility]     ESOPHAGOSCOPY, GASTROSCOPY, DUODENOSCOPY (EGD),  COMBINED N/A 8/22/2014    Procedure: COMBINED ESOPHAGOSCOPY, GASTROSCOPY, DUODENOSCOPY (EGD), BIOPSY SINGLE OR MULTIPLE;  Surgeon: Mello Ferrer MD;  [facility]     ESOPHAGOSCOPY, GASTROSCOPY, DUODENOSCOPY (EGD), COMBINED N/A 10/2/2014    Procedure: COMBINED ESOPHAGOSCOPY, GASTROSCOPY, DUODENOSCOPY (EGD), BIOPSY SINGLE OR MULTIPLE;  Surgeon: Remy Haskins MD;  [facility]     ESOPHAGOSCOPY, GASTROSCOPY, DUODENOSCOPY (EGD), COMBINED Left 12/15/2014    Procedure: COMBINED ESOPHAGOSCOPY, GASTROSCOPY, DUODENOSCOPY (EGD), BIOPSY SINGLE OR MULTIPLE;  Surgeon: Remy Haskins MD;  [facility]     ESOPHAGOSCOPY, GASTROSCOPY, DUODENOSCOPY (EGD), COMBINED N/A 2/25/2015    Procedure: COMBINED ENDOSCOPIC ULTRASOUND, ESOPHAGOSCOPY, GASTROSCOPY, DUODENOSCOPY (EGD), FINE NEEDLE ASPIRATE/BIOPSY;  Surgeon: Clemente Lugo MD;  [facility]     ESOPHAGOSCOPY, GASTROSCOPY, DUODENOSCOPY (EGD), COMBINED Left 2/25/2015    Procedure: COMBINED ESOPHAGOSCOPY, GASTROSCOPY, DUODENOSCOPY (EGD), BIOPSY SINGLE OR MULTIPLE;  Surgeon: Clemente Lugo MD;  [facility]     ESOPHAGOSCOPY, GASTROSCOPY, DUODENOSCOPY (EGD), COMBINED N/A 9/25/2016    Procedure: COMBINED ESOPHAGOSCOPY, GASTROSCOPY, DUODENOSCOPY (EGD);  Surgeon: Aziza Patiño MD;  [facility]     ESOPHAGOSCOPY, GASTROSCOPY, DUODENOSCOPY (EGD), COMBINED N/A 1/18/2017    Procedure: COMBINED ESOPHAGOSCOPY, GASTROSCOPY, DUODENOSCOPY (EGD), BIOPSY SINGLE OR MULTIPLE;  Surgeon: Clemente Lopez MD;  [facility]     FASCIOTOMY LOWER EXTREMITY Left 6/10/2016    Procedure: FASCIOTOMY LOWER EXTREMITY;  Surgeon: Mello Rodriguez MD;  [facility]     HC CAPSULE ENDOSCOPY N/A 8/25/2014    Procedure: CAPSULE/PILL CAM ENDOSCOPY;  Surgeon: Remy Haskins MD;  [facility]     HC CAPSULE ENDOSCOPY N/A 10/2/2014    Procedure: CAPSULE/PILL CAM ENDOSCOPY;  Surgeon: Remy Haskins MD;  Location:  GI     ORTHOPEDIC SURGERY       broken wrist repair     SEX TRANSFORMATION SURGERY, MALE TO FEMALE      1974     SINUS SURGERY      cyst removed     TONSILLECTOMY       VASCULAR SURGERY      Left carotid stent       Social History   Substance Use Topics     Smoking status: Former Smoker     Packs/day: 2.50     Years: 30.00     Types: Cigarettes, Cigars     Quit date: 11/1/2000     Smokeless tobacco: Never Used     Alcohol use No     Family History   Problem Relation Age of Onset     Dementia Mother      Glaucoma Mother      DIABETES Mother      Coronary Artery Disease Mother      MI     Glaucoma Father      DIABETES Father      Heart Failure Father      CANCER Maternal Aunt      leukemia     Schizophrenia Brother      Depression Brother      Suicide Sister      Depression Sister      DIABETES Sister      CANCER Maternal Aunt      ovarian     Glaucoma Maternal Grandmother      DIABETES Maternal Grandmother      Glaucoma Maternal Grandfather      DIABETES Maternal Grandfather      Glaucoma Paternal Grandmother      DIABETES Paternal Grandmother      Glaucoma Paternal Grandfather      DIABETES Paternal Grandfather      Breast Cancer Sister      CEREBROVASCULAR DISEASE Brother          Current Outpatient Prescriptions   Medication Sig Dispense Refill     lidocaine (LIDODERM) 5 % Patch Apply from 1 to 3 patches to painful area at once for up to 12 h within a 24 h period.  Remove after 12 hours. 30 patch 1     blood glucose monitoring (ONE TOUCH ULTRA) test strip Use to test blood sugars 3 times daily or as directed. 3 Box 3     order for DME Equipment being ordered: wheelchair seat cushion 1 Device 0     order for DME Equipment being ordered: CPAP supplies mask, hose, filters, cushion    fax to [facility] at [phone] 10 Device prn     famotidine (PEPCID) 20 MG tablet Take 1 tablet (20 mg) by mouth 2 times daily 60 tablet 11     sucralfate (CARAFATE) 1 GM tablet Take 1 tablet (1 g) by mouth 4 times daily May dissolve in 10 mL water is necessary.  (Start upon completion of carafate suspension) 40 tablet 5     order for DME Equipment being ordered: CPAP supplies mask, hose, filters, cushion fax to [facility] at [phone]  Disp: 10 Device Refills: prn   Class: Local Print Start: 2/10/2017 1 Device 0     pantoprazole (PROTONIX) 40 MG EC tablet Take 1 tablet (40 mg) by mouth 2 times daily Then QD 60 tablet 5     order for DME Equipment being ordered: Nebulizer and tubing supplies 1 Units 0     oxyCODONE-acetaminophen (PERCOCET) 5-325 MG per tablet Take 1-2 tablets every 3 hours as needed for pain. 60 tablet 0     albuterol (2.5 MG/3ML) 0.083% neb solution INHALE 1 VIAL VIA NEBULIZER EVERY 6 HOURS AS NEEDED 360 mL 11     sucralfate (CARAFATE) 1 GM/10ML suspension Take 10 mLs (1 g) by mouth 4 times daily (before meals and nightly) 1200 mL 2     order for DME ACcu check BID 1 Device 0     ondansetron (ZOFRAN-ODT) 4 MG ODT tab Take 1 tablet (4 mg) by mouth every 6 hours 120 tablet 0     metoprolol (TOPROL-XL) 25 MG 24 hr tablet Take 1 tablet (25 mg) by mouth daily 30 tablet 0     metoclopramide (REGLAN) 5 MG tablet Take 1 tablet (5 mg) by mouth 3 times daily (before meals) 240 tablet 0     CYANOCOBALAMIN PO Take 2,000 mcg by mouth daily       gabapentin (NEURONTIN) 300 MG capsule Take 1 capsule (300 mg) by mouth 3 times daily 90 capsule 3     Nutritional Supplements (RENÉE) PACK Take 1 packet by mouth 2 times daily       polyethylene glycol (MIRALAX/GLYCOLAX) powder Take 17 g by mouth daily       HYDROmorphone (DILAUDID) 2 MG tablet Take 1 tablet (2 mg) by mouth every 3 hours as needed for moderate to severe pain 30 tablet 0     fluticasone (FLONASE) 50 MCG/ACT nasal spray Spray 1 spray into both nostrils daily       ADVAIR DISKUS 250-50 MCG/DOSE diskus inhaler Inhale 2 puffs into the lungs daily       calcium citrate-vitamin D (CITRACAL) 315-250 MG-UNIT TABS Take 2 tablets by mouth daily 120 tablet 5     pregabalin (LYRICA) 75 MG capsule Take 1 capsule (75  mg) by mouth 2 times daily (takes at 8 AM and 8 PM) 90 capsule 3     ferrous sulfate (IRON) 325 (65 FE) MG tablet Take 1 tablet (325 mg) by mouth 2 times daily With meals 60 tablet 2     hydrochlorothiazide (MICROZIDE) 12.5 MG capsule Take 1 capsule (12.5 mg) by mouth daily 90 capsule 3     lisinopril (PRINIVIL,ZESTRIL) 2.5 MG tablet Take 1 tablet (2.5 mg) by mouth daily 90 tablet 3     escitalopram (LEXAPRO) 20 MG tablet One per day 90 tablet 3     estradiol (ESTRACE) 1 MG tablet Take 1 tablet (1 mg) by mouth daily 90 tablet 3     levETIRAcetam (KEPPRA) 500 MG tablet Take 1 tablet (500 mg) by mouth 2 times daily (Patient taking differently: Take 500 mg by mouth 3 times daily ) 180 tablet 1     traZODone (DESYREL) 100 MG tablet Take 1 tablet (100 mg) by mouth At Bedtime 90 tablet 3     rOPINIRole (REQUIP) 0.25 MG tablet Take 3 tablets (0.75 mg) by mouth At Bedtime 270 tablet 1     aspirin EC 81 MG EC tablet Take 1 tablet (81 mg) by mouth daily 90 tablet 3     clopidogrel (PLAVIX) 75 MG tablet Take 1 tablet (75 mg) by mouth daily (Patient taking differently: Take 75 mg by mouth At Bedtime ) 90 tablet 3     risperiDONE (RISPERDAL) 1 MG tablet Take 0.5 tablets (0.5 mg) by mouth At Bedtime 90 tablet 0     blood glucose monitoring (ONE TOUCH ULTRA 2) meter device kit Use to test blood sugars 3 times daily or as directed. 1 kit 0     blood glucose monitoring (ONE TOUCH ULTRASOFT) lancets Use to test blood sugar 3 times daily or as directed. 3 Box 3     phenytoin 200 MG CAPS Take 200 mg by mouth 2 times daily (Patient taking differently: Take 200 mg by mouth 2 times daily (takes at 8 AM and 8 PM)) 60 capsule 0     DOCUSATE SODIUM PO Take 100 mg by mouth daily       tamsulosin (FLOMAX) 0.4 MG 24 hr capsule Take 0.4 mg by mouth At Bedtime       dorzolamide (TRUSOPT) 2 % ophthalmic solution Place 1 drop into both eyes 2 times daily        diazepam (VALIUM) 5 MG tablet Take 1 tablet (5 mg) by mouth every 6 hours as needed for  anxiety 20 tablet 1     SPIRIVA HANDIHALER 18 MCG inhalation capsule INHALE THE CONTENTS OF 1 CAPSULE VIA INHALATION DEVICE DAILY 30 capsule 2     zinc 50 MG TABS Take 1 tablet by mouth daily       cetirizine (ZYRTEC) 10 MG tablet Take 1 tablet (10 mg) by mouth every evening 90 tablet 3     nitroglycerin (NITROSTAT) 0.4 MG SL tablet Place 1 tablet (0.4 mg) under the tongue every 5 minutes as needed for chest pain if you are still having symptoms after 3 doses (15 minutes) call 911. 25 tablet 1     MEDICATION GIVEN BY INTRATHECAL PUMP - INSTRUCTION continuous 4/11/2016 per [facility] in [place] [phone]:  Conc: Bupivacaine 20 mg/mL and Fentanyl 2000 mcg/mL.  Continuous: Bupivacaine 5.052 mg/day and Fentanyl 505.2 mcg/day.  Boluses: Up to 7 boluses per 24-hr period of Bupivicaine 0.5992 mg and Fentanyl 59.9 mcg  Max daily dose: Bupivacaine 9.1973 mg/day and Fentanyl 919.5883 mcg/day    Pump Last Fill Date:  6/30/2016  Pump Refill Date: 9/20/2016       latanoprost (XALATAN) 0.005 % ophthalmic solution Place 1 drop into both eyes At Bedtime 2.5 mL 11     atorvastatin (LIPITOR) 40 MG tablet Take 1 tablet (40 mg) by mouth daily (Patient taking differently: Take 40 mg by mouth At Bedtime ) 90 tablet 3     lactulose (CHRONULAC) 10 GM/15ML solution Take 30 mLs (20 g) by mouth daily as needed for constipation 946 mL 11     order for DME Equipment being ordered: Glucerna daily shakes with each meal 1 Box 11     prochlorperazine (COMPAZINE) 10 MG tablet Take 1 tablet (10 mg) by mouth every 8 hours as needed 20 tablet 0     thin (NO BRAND SPECIFIED) lancets Use to test blood sugar 3 times daily or as directed. 1 Box 10     ORDER FOR DME Equipment being ordered: Nebulizer and supplies 1 Units 0     ORDER FOR DME Equipment being ordered: Power Wheelchair 1 Device 0     ORDER FOR DME Equipment being ordered: Depends briefs 1 Month 11     EASY TOUCH LANCETS 30G/TWIST MISC        Cholecalciferol  (VITAMIN D) 2000 UNITS tablet Take 2,000 Units by mouth daily. 100 tablet 3     Multiple Vitamin (MULTIVITAMIN OR) Take 1 tablet by mouth daily        Allergies   Allergen Reactions     Bee Venom      Penicillins Anaphylaxis     Other reaction(s): Skin Rash and/or Hives     Dilantin [Phenytoin] Other (See Comments)     Generic dilantin only per pt     Iodide Hives     09/11/15: Pt states she can use iodine and doesn't have any problems      Iodine-131      Novocaine [Procaine] Hives     Other reaction(s): Skin Rash and/or Hives     BP Readings from Last 3 Encounters:   03/15/17 149/81   03/06/17 126/68   02/22/17 113/62    Wt Readings from Last 3 Encounters:   03/22/17 133 lb (60.3 kg)   03/15/17 135 lb (61.2 kg)   03/06/17 130 lb (59 kg)           Reviewed and updated as needed this visit by clinical staff       Reviewed and updated as needed this visit by Provider       ROS:  C: NEGATIVE for fever, chills, change in weight  E/M: NEGATIVE for ear, mouth and throat problems  R: NEGATIVE for significant cough or SOB  CV: NEGATIVE for chest pain, palpitations or peripheral edema  GI: NEGATIVE for nausea, abdominal pain, heartburn, or change in bowel habits  : NEGATIVE for frequency, dysuria, or hematuria  M: NEGATIVE for significant arthralgias or myalgia  P: NEGATIVE for changes in mood or affect    OBJECTIVE:                                                    /70 (BP Location: Left arm, Patient Position: Chair, Cuff Size: Adult Large)  Pulse 96  Temp 98.4  F (36.9  C) (Oral)  SpO2 98%  There is no height or weight on file to calculate BMI.  GENERAL: alert and no distress in wheelchair  EYES: Eyes grossly normal to inspection, extraocular movements - intact, and PERRL  HENT: ear canals- normal; TMs- normal; Nose- normal; Mouth- no ulcers, no lesions  NECK: no tenderness, no adenopathy, no asymmetry, no masses, no stiffness; thyroid- normal to palpation  RESP: lungs clear to auscultation - no rales, no  rhonchi, no wheezes  CV: regular rates and rhythm, normal S1 S2, no S3 or S4 and no murmur, no click or rub -  SKIN: small superficial bruises well healed to bilateral upper inner arms  PSYCH: Alert and oriented times 3; speech- coherent , normal rate and volume; able to articulate logical thoughts, able to abstract reason, no tangential thoughts, no hallucinations or delusions, affect- normal  In wheelchair, bilateral BKA's     ASSESSMENT/PLAN:                                                      (S40.029A) Arm bruise, unspecified laterality, initial encounter  (primary encounter diagnosis)  Comment: stable, resolving as noted on ASA and Plavix  Plan: would not favor stopping at present    (D50.9) Iron deficiency anemia, unspecified iron deficiency anemia type  Comment: on iron, recheck HGB  Plan: Hemoglobin            (Z89.511) 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 (H)  Comment: doing well with wheelchair.  Plan:       See Patient Instructions    Allan Casey MD  [facility]

## 2017-03-23 NOTE — MR AVS SNAPSHOT
After Visit Summary   3/23/2017    Sonya Foote    MRN: [id number]           Patient Information     Date Of Birth          1950        Visit Information        Provider Department      3/23/2017 2:40 PM Allan Casey MD [facility]        Today's Diagnoses     Arm bruise, unspecified laterality, initial encounter    -  1    Iron deficiency anemia, unspecified iron deficiency anemia type        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 (H)           Follow-ups after your visit        Follow-up notes from your care team     Return if symptoms worsen or fail to improve.      Your next 10 appointments already scheduled     Mar 23, 2017  2:40 PM CDT   Office Visit with Allan Casey MD   [facility] ([facility])    [address] [id number] [phone]           Bring a current list of meds and any records pertaining to this visit.  For Physicals, please bring immunization records and any forms needing to be filled out.  Please arrive 10 minutes early to complete paperwork.            Mar 30, 2017 11:15 AM CDT   (Arrive by 11:00 AM)   New Patient Visit with Ky Ramírez MD   [facility] ([facility])    [address]  5th Floor  [facility] [id number] [phone]            Mar 31, 2017 10:30 AM CDT   Office Visit with Elizabeth Rose [facility] ([facility])    [address] [id number] [phone]           Bring a current list of meds and any records pertaining to this visit.  For Physicals, please bring immunization records and any forms needing to be filled out.  Please arrive 10 minutes early to complete paperwork.            Apr 04, 2017  2:45 PM CDT   Return Visit with Bj Anderson MD   [facility] [place] ([facility])    [address]  [place] [address]   [phone]            Apr 26, 2017  8:40 AM CDT   (Arrive by 8:25 AM)   NEW HEART FAILURE with Radha Aldrich MD   [facility] ([facility])    [address]  3rd [facility] [address]   [phone]            May 08, 2017  1:00 PM CDT   Nurse Visit with UA NURSE   [facility] ([facility])    [address]  [address]  [facility] [address]   [phone]            May 12, 2017 10:50 AM CDT   (Arrive by 10:35 AM)   RETURN DIABETES with Michelle Irizarry MD   [facility] ([facility])    [address] [place] [address]  3rd [facility] [address]   [phone]            May 18, 2017  1:00 PM CDT   (Arrive by 12:45 PM)   Cystoscopy with Elizabeth Oliver MD   [facility]rologic C[facility] ([facility])    [address]  4th [facility] [address]   [phone]            Jul 10, 2017 11:00 AM CDT   (Arrive by 10:45 AM)   Return Visit with Alina Jennings MD   [facility] ([facility])    [address] [place] [address] [phone]              Who to contact     If you have questions or need follow up information about today's clinic visit or your schedule please contact [facility] directly at [phone].  Normal or non-critical lab and imaging results will be communicated to you by MyChart, letter or phone within 4 business days after the clinic has received the results. If you do not hear from us within 7 days, please contact the clinic through MyChart or phone. If you have a critical or abnormal lab result, we will notify you by phone as soon as possible.  Submit refill requests through  "Jaim[employer] or call your pharmacy and they will forward the refill request to us. Please allow 3 business days for your refill to be completed.          Additional Information About Your Visit        MyChart Information     [employer] lets you send messages to your doctor, view your test results, renew your prescriptions, schedule appointments and more. To sign up, go to [email or web address] . Click on \"Log in\" on the left side of the screen, which will take you to the Welcome page. Then click on \"Sign up Now\" on the right side of the page.     You will be asked to enter the access code listed below, as well as some personal information. Please follow the directions to create your username and password.     Your access code is: [email or web address]  Expires: 3/26/2017  7:30 AM     Your access code will  in 90 days. If you need help or a new code, please call your [place] clinic or [phone].        Care EveryWhere ID     This is your Care EveryWhere ID. This could be used by other organizations to access your [place] medical records  [id number]        Your Vitals Were     Pulse Temperature Pulse Oximetry             96 98.4  F (36.9  C) (Oral) 98%          Blood Pressure from Last 3 Encounters:   17 138/70   03/15/17 149/81   17 126/68    Weight from Last 3 Encounters:   17 133 lb (60.3 kg)   03/15/17 135 lb (61.2 kg)   17 130 lb (59 kg)              We Performed the Following     Hemoglobin          Today's Medication Changes          These changes are accurate as of: 3/23/17  2:24 PM.  If you have any questions, ask your nurse or doctor.               These medicines have changed or have updated prescriptions.        Dose/Directions    atorvastatin 40 MG tablet   Commonly known as:  LIPITOR   This may have changed:  when to take this   Used for:  Hyperlipidemia LDL goal <100        Dose:  40 mg   Take 1 tablet (40 mg) by mouth daily   Quantity:  90 tablet   Refills:  3       clopidogrel 75 " MG tablet   Commonly known as:  PLAVIX   This may have changed:  when to take this   Used for:  PAD (peripheral artery disease) (H), UGI bleed, Osteoporosis, Nausea        Dose:  75 mg   Take 1 tablet (75 mg) by mouth daily   Quantity:  90 tablet   Refills:  3       levETIRAcetam 500 MG tablet   Commonly known as:  KEPPRA   This may have changed:  when to take this   Used for:  Nausea        Dose:  500 mg   Take 1 tablet (500 mg) by mouth 2 times daily   Quantity:  180 tablet   Refills:  1       Phenytoin Sodium Extended 200 MG Caps   This may have changed:  additional instructions   Used for:  Seizure disorder (H)        Dose:  200 mg   Take 200 mg by mouth 2 times daily   Quantity:  60 capsule   Refills:  0            Where to get your medicines      These medications were sent to [facility] [address] [place], [place] [address] AT [place] [address] [facility] [address]    Hours:  24-hours Phone:  [phone]     ferrous sulfate 325 (65 FE) MG tablet                Primary Care Provider Office Phone # Fax #    Allan Casey [phone]       [facility] [address]  [facility] [address]        Thank you!     Thank you for choosing [facility]  for your care. Our goal is always to provide you with excellent care. Hearing back from our patients is one way we can continue to improve our services. Please take a few minutes to complete the written survey that you may receive in the mail after your visit with us. Thank you!             Your Updated Medication List - Protect others around you: Learn how to safely use, store and throw away your medicines at www.disposemymeds.org.          This list is accurate as of: 3/23/17  2:24 PM.  Always use your most recent med list.                   Brand Name Dispense Instructions for use    ADVAIR DISKUS 250-50 MCG/DOSE diskus inhaler   Generic drug:  fluticasone-salmeterol       Inhale 2 puffs into the lungs daily       albuterol (2.5 MG/3ML) 0.083% neb solution     360 mL    INHALE 1 VIAL VIA NEBULIZER EVERY 6 HOURS AS NEEDED       aspirin 81 MG EC tablet     90 tablet    Take 1 tablet (81 mg) by mouth daily       atorvastatin 40 MG tablet    LIPITOR    90 tablet    Take 1 tablet (40 mg) by mouth daily       blood glucose monitoring meter device kit     1 kit    Use to test blood sugars 3 times daily or as directed.       blood glucose monitoring test strip    ONE TOUCH ULTRA    3 Box    Use to test blood sugars 3 times daily or as directed.       calcium citrate-vitamin D 315-250 MG-UNIT Tabs per tablet    CITRACAL    120 tablet    Take 2 tablets by mouth daily       cetirizine 10 MG tablet    zyrTEC    90 tablet    Take 1 tablet (10 mg) by mouth every evening       clopidogrel 75 MG tablet    PLAVIX    90 tablet    Take 1 tablet (75 mg) by mouth daily       CYANOCOBALAMIN PO      Take 2,000 mcg by mouth daily       diazepam 5 MG tablet    VALIUM    20 tablet    Take 1 tablet (5 mg) by mouth every 6 hours as needed for anxiety       DOCUSATE SODIUM PO      Take 100 mg by mouth daily       dorzolamide 2 % ophthalmic solution    TRUSOPT     Place 1 drop into both eyes 2 times daily       * EASY TOUCH LANCETS 30G/TWIST Misc          * thin lancets    NO BRAND SPECIFIED    1 Box    Use to test blood sugar 3 times daily or as directed.       * blood glucose monitoring lancets     3 Box    Use to test blood sugar 3 times daily or as directed.       escitalopram 20 MG tablet    LEXAPRO    90 tablet    One per day       estradiol 1 MG tablet    ESTRACE    90 tablet    Take 1 tablet (1 mg) by mouth daily       famotidine 20 MG tablet    PEPCID    60 tablet    Take 1 tablet (20 mg) by mouth 2 times daily       ferrous sulfate 325 (65 FE) MG tablet    IRON    60 tablet    Take 1 tablet (325 mg) by mouth 2 times daily With meals       FLOMAX 0.4 MG capsule   Generic drug:  tamsulosin      Take  0.4 mg by mouth At Bedtime       fluticasone 50 MCG/ACT spray    FLONASE     Spray 1 spray into both nostrils daily       gabapentin 300 MG capsule    NEURONTIN    90 capsule    Take 1 capsule (300 mg) by mouth 3 times daily       hydrochlorothiazide 12.5 MG capsule    MICROZIDE    90 capsule    Take 1 capsule (12.5 mg) by mouth daily       HYDROmorphone 2 MG tablet    DILAUDID    30 tablet    Take 1 tablet (2 mg) by mouth every 3 hours as needed for moderate to severe pain       RENÉE Pack      Take 1 packet by mouth 2 times daily       lactulose 10 GM/15ML solution    CHRONULAC    946 mL    Take 30 mLs (20 g) by mouth daily as needed for constipation       latanoprost 0.005 % ophthalmic solution    XALATAN    2.5 mL    Place 1 drop into both eyes At Bedtime       levETIRAcetam 500 MG tablet    KEPPRA    180 tablet    Take 1 tablet (500 mg) by mouth 2 times daily       lidocaine 5 % Patch    LIDODERM    30 patch    Apply from 1 to 3 patches to painful area at once for up to 12 h within a 24 h period.  Remove after 12 hours.       lisinopril 2.5 MG tablet    PRINIVIL/Zestril    90 tablet    Take 1 tablet (2.5 mg) by mouth daily       MEDICATION GIVEN BY INTRATHECAL PUMP - INSTRUCTION      continuous 4/11/2016 per [facility] in [place] [phone]: Conc: Bupivacaine 20 mg/mL and Fentanyl 2000 mcg/mL. Continuous: Bupivacaine 5.052 mg/day and Fentanyl 505.2 mcg/day. Boluses: Up to 7 boluses per 24-hr period of Bupivicaine 0.5992 mg and Fentanyl 59.9 mcg Max daily dose: Bupivacaine 9.1973 mg/day and Fentanyl 919.5883 mcg/day  Pump Last Fill Date:  6/30/2016 Pump Refill Date: 9/20/2016       metoclopramide 5 MG tablet    REGLAN    240 tablet    Take 1 tablet (5 mg) by mouth 3 times daily (before meals)       metoprolol 25 MG 24 hr tablet    TOPROL-XL    30 tablet    Take 1 tablet (25 mg) by mouth daily       MULTIVITAMIN PO      Take 1 tablet by mouth daily       nitroglycerin 0.4 MG  sublingual tablet    NITROSTAT    25 tablet    Place 1 tablet (0.4 mg) under the tongue every 5 minutes as needed for chest pain if you are still having symptoms after 3 doses (15 minutes) call 911.       ondansetron 4 MG ODT tab    ZOFRAN-ODT    120 tablet    Take 1 tablet (4 mg) by mouth every 6 hours       * order for DME     1 Month    Equipment being ordered: Depends briefs       * order for DME     1 Device    Equipment being ordered: Power Wheelchair       * order for DME     1 Units    Equipment being ordered: Nebulizer and supplies       * order for DME     1 Box    Equipment being ordered: Glucerna daily shakes with each meal       * order for DME     1 Device    ACcu check BID       * order for DME     1 Units    Equipment being ordered: Nebulizer and tubing supplies       * order for DME     1 Device    Equipment being ordered: CPAP supplies mask, hose, filters, cushion fax to [facility] at [phone] Disp: 10 DeviceRefills: prn Class: Local PrintStart: 2/10/2017       * order for DME     10 Device    Equipment being ordered: CPAP supplies mask, hose, filters, cushion  fax to [facility] at [phone]       * order for DME     1 Device    Equipment being ordered: wheelchair seat cushion       oxyCODONE-acetaminophen 5-325 MG per tablet    PERCOCET    60 tablet    Take 1-2 tablets every 3 hours as needed for pain.       pantoprazole 40 MG EC tablet    PROTONIX    60 tablet    Take 1 tablet (40 mg) by mouth 2 times daily Then QD       Phenytoin Sodium Extended 200 MG Caps     60 capsule    Take 200 mg by mouth 2 times daily       polyethylene glycol powder    MIRALAX/GLYCOLAX     Take 17 g by mouth daily       pregabalin 75 MG capsule    LYRICA    90 capsule    Take 1 capsule (75 mg) by mouth 2 times daily (takes at 8 AM and 8 PM)       prochlorperazine 10 MG tablet    COMPAZINE    20 tablet    Take 1 tablet (10 mg) by mouth every 8 hours as needed       risperiDONE 1 MG tablet    risperDAL     90 tablet    Take 0.5 tablets (0.5 mg) by mouth At Bedtime       rOPINIRole 0.25 MG tablet    REQUIP    270 tablet    Take 3 tablets (0.75 mg) by mouth At Bedtime       SPIRIVA HANDIHALER 18 MCG capsule   Generic drug:  tiotropium     30 capsule    INHALE THE CONTENTS OF 1 CAPSULE VIA INHALATION DEVICE DAILY       * sucralfate 1 GM/10ML suspension    CARAFATE    1200 mL    Take 10 mLs (1 g) by mouth 4 times daily (before meals and nightly)       * sucralfate 1 GM tablet    CARAFATE    40 tablet    Take 1 tablet (1 g) by mouth 4 times daily May dissolve in 10 mL water is necessary. (Start upon completion of carafate suspension)       traZODone 100 MG tablet    DESYREL    90 tablet    Take 1 tablet (100 mg) by mouth At Bedtime       vitamin D 2000 UNITS tablet     100 tablet    Take 2,000 Units by mouth daily.       zinc 50 MG Tabs      Take 1 tablet by mouth daily       * Notice:  This list has 14 medication(s) that are the same as other medications prescribed for you. Read the directions carefully, and ask your doctor or other care provider to review them with you.

## 2017-03-23 NOTE — LETTER
[facility]  [address]  [address]      March 23, 2017      Sonya Foote  [address]   [facility] [address]          Dear Sonya,      I have enclosed a copy of your most recent labs done here at the clinic and if available some of your prior labs for comparison.     Your hemoglobin function test is slightly abnormal and low but stable and improving and should be rechecked here in the clinic in 3 months with a follow-up visit with me.  I will look forward to seeing you at that time and please call to make an appointment.    Please call me if you have further questions.        Allan Casey MD

## 2017-03-24 ENCOUNTER — TELEPHONE (OUTPATIENT)
Dept: SLEEP MEDICINE | Facility: CLINIC | Age: 67
End: 2017-03-24

## 2017-03-24 NOTE — TELEPHONE ENCOUNTER
Called pt to confirm the DME company that she has used in the past. She used [employer]. I was able to confirm through [employer] that the date of purchase for her CPAP was 12/21/2012. I called the pt back and let her know that she would be eligible for a new CPAP at the end of this year. [facility] will make a home visit on 3/27/17 to change her CPAP settings to match her new RX and take her new CPAP supplies.

## 2017-03-27 ENCOUNTER — DOCUMENTATION ONLY (OUTPATIENT)
Dept: SLEEP MEDICINE | Facility: CLINIC | Age: 67
End: 2017-03-27

## 2017-03-27 ENCOUNTER — CARE COORDINATION (OUTPATIENT)
Dept: GERIATRIC MEDICINE | Facility: CLINIC | Age: 67
End: 2017-03-27

## 2017-03-27 NOTE — PROGRESS NOTES
3/27/17: CM received call from member  - stating that she cannot get a new CPAP machine until December when it will be 5 years.  She did received the supplies, mask, tubing, etc. However for her current CPAP - she is now receiving this through [facility].     Reminded member of MTM visit on Friday with ELVIRA and Elizabeth Rose at her home - Cm placed call to nursing office - JOSE LUIS Johnson states she will be working on Friday and will be present for the MTM.     Member states she is still receiving collection letters as well as weekly phone calls from [employer] regarding med claims from 7/2016.   ELVIRA sent email to Libia Orlando at [employer] to f/u - Libia states she will f/u with pharmacy.     Jamie Sharma RN, PHN  [facility]

## 2017-03-27 NOTE — PROGRESS NOTES
Pt seen in the home today for mask fitting. Pt was fitted to [facility] [employer] [facility] and received all new supplies. Reviewed care and use of equipment. Informed Pt on procedure to re-order supplies. Pt owns [employer] Auto Set for Her S9 machine. Settings was manually changed to 12 cmH2O per CMN. Ramp is set to 45 mins starting at 8 cmH2O. Pt has the flexibility to change time on ramp. EPR set at 2. Pt is missing filter cover. I am sending a filter cover to S and Pt instructed to call the clinic Wednesday 3/29/17 afternoon to confirm delivery prior to picking up the item. No charge for item.

## 2017-03-30 ENCOUNTER — OFFICE VISIT (OUTPATIENT)
Dept: ORTHOPEDICS | Facility: CLINIC | Age: 67
End: 2017-03-30

## 2017-03-30 DIAGNOSIS — M79.642 BILATERAL HAND PAIN: Primary | ICD-10-CM

## 2017-03-30 DIAGNOSIS — M79.641 BILATERAL HAND PAIN: Primary | ICD-10-CM

## 2017-03-30 DIAGNOSIS — M79.642 BILATERAL HAND PAIN: ICD-10-CM

## 2017-03-30 DIAGNOSIS — M79.641 BILATERAL HAND PAIN: ICD-10-CM

## 2017-03-30 DIAGNOSIS — R35.0 URINARY FREQUENCY: ICD-10-CM

## 2017-03-30 NOTE — PROGRESS NOTES
Subjective:   Sonya Foote is a 67 year old female who is being seen for bilateral hand pain. She reports she has had pain for years but over the last 6-8 months the hands have gotten worse. She has a history of right wrist surgery to repair a fracture in 1990.    Sonya has an unfortunate past medical history and multiple medical problems.  She recently underwent a lower extremity amputation for peripheral vascular disease.  She states that she has had bilateral hand discomfort and aching for several years.  She was previously seen by Dr. Arroyo and had radiographs which demonstrated some degenerative changes.  She did see occupational therapy and did have some benefit.  She is on multiple neuroactive medications.  She is also on intermittent pain medications.  She states that warm or superficial heat feels better than cold.  She notes that weather changes seem to be aggravating as well.  She is here to see if there is anything further that can be done to help assist with her overall discomfort in her hands.  She states most of her discomfort seems to be in the proximal phalanges and middle phalanges.  Mostly it is her PIP joints which are mostly bothersome.  She does state that she had a prior Dupuytren release on her right but that has not been problematic since that time.       Background:   Date of injury: NA   Duration of symptoms: 8 months  Mechanism of Injury: Insidious Onset; Unknown   Aggravating factors: nights, picking up stuff, use of hands  Relieving Factors: heat  Prior Evaluation: Prior Physician Evalutation    PAST MEDICAL, SOCIAL, SURGICAL AND FAMILY HISTORY: She  has a past medical history of Anemia; Antiplatelet or antithrombotic long-term use; Arthritis; AS (sickle cell trait) (H) (10/8/2013); Blind left eye; BPPV (benign paroxysmal positional vertigo); Chronic back pain; COPD (chronic obstructive pulmonary disease) (H); Coronary artery disease; CVA (cerebral infarction) (10/8/2012); Diastolic CHF  (H) (9/4/2012); Dilantin toxicity (5/31/2013); Esophageal candidiasis (H); GERD (gastroesophageal reflux disease); Heart attack (H); Hernia, abdominal; History of blood transfusion; History of thrombophlebitis; HTN (hypertension) (9/4/2012); Hyperlipidemia LDL goal <100 (9/4/2012); Legally blind in right eye, as defined in [place]; Osteoporosis (1/21/2013); Other chronic pain; PAD (peripheral artery disease) (H); Palpitations; Person who has had sex change operation (1/20/2014); Pneumonia; Schizoaffective disorder (H); Seizure disorder (H) (9/4/2012); Seizures (H); Sleep apnea; Thrombosis of leg; Type 2 diabetes, HbA1C goal < 8% (H) (9/4/2012); Uncomplicated asthma; and Unspecified cerebral artery occlusion with cerebral infarction. She also has no past medical history of Asymptomatic human immunodeficiency virus (HIV) infection status (H); Cerebral palsy (H); Congenital renal agenesis and dysgenesis; Difficult intubation; Goiter; Gout; History of spinal cord injury; Horseshoe kidney; Hydrocephalus; Malignant hyperthermia; Mumps; Parkinsons disease (H); Spider veins; Spina bifida (H); STD (sexually transmitted disease); Tethered cord (H); or Tuberculosis.  She  has a past surgical history that includes appendectomy; tonsillectomy; Cholecystectomy; orthopedic surgery; Breast surgery; sinus surgery; Esophagoscopy, gastroscopy, duodenoscopy (EGD), combined (12/14/2012); vascular surgery; Esophagoscopy, gastroscopy, duodenoscopy (EGD), combined (12/31/2013); Sex transformation, male to female; Esophagoscopy, gastroscopy, duodenoscopy (EGD), combined (4/1/2014); Endarterectomy femoral (5/23/2014); Esophagoscopy, gastroscopy, duodenoscopy (EGD), combined (6/28/2014); Colonoscopy with CO2 insufflation (N/A, 8/20/2014); Esophagoscopy, gastroscopy, duodenoscopy (EGD), combined (N/A, 8/20/2014); Esophagoscopy, gastroscopy, duodenoscopy (EGD), combined (N/A, 8/22/2014); CAPSULE ENDOSCOPY (N/A, 8/25/2014); Colonoscopy (N/A,  8/25/2014); CAPSULE ENDOSCOPY (N/A, 10/2/2014); Esophagoscopy, gastroscopy, duodenoscopy (EGD), combined (N/A, 10/2/2014); Angiogram (Bilateral, 11/21/2014); Esophagoscopy, gastroscopy, duodenoscopy (EGD), combined (Left, 12/15/2014); Angiogram (Left, 1/16/2015); Esophagoscopy, gastroscopy, duodenoscopy (EGD), combined (N/A, 2/25/2015); Esophagoscopy, gastroscopy, duodenoscopy (EGD), combined (Left, 2/25/2015); Angiogram (Bilateral, 9/14/2015); Angiogram (Left, 10/12/2015); Amputate toe(s) (Right, 1/5/2016); Amputate leg above knee (Left, 6/11/2016); Fasciotomy lower extremity (Left, 6/10/2016); Angiogram (Right, 6/6/2016); Angioplasty (Right, 6/6/2016); Esophagoscopy, gastroscopy, duodenoscopy (EGD), combined (N/A, 9/25/2016); Amputate leg below knee (Right, 11/7/2016); Amputate revision stump lower extremity (Right, 11/11/2016); Amputate revision stump lower extremity (Right, 11/16/2016); and Esophagoscopy, gastroscopy, duodenoscopy (EGD), combined (N/A, 1/18/2017).  Her family history includes Breast Cancer in her sister; CANCER in her maternal aunt and maternal aunt; CEREBROVASCULAR DISEASE in her brother; Coronary Artery Disease in her mother; DIABETES in her father, maternal grandfather, maternal grandmother, mother, paternal grandfather, paternal grandmother, and sister; Dementia in her mother; Depression in her brother and sister; Glaucoma in her father, maternal grandfather, maternal grandmother, mother, paternal grandfather, and paternal grandmother; Heart Failure in her father; Schizophrenia in her brother; Suicide in her sister.  She reports that she quit smoking about 16 years ago. Her smoking use included Cigarettes and Cigars. She has a 75.00 pack-year smoking history. She has never used smokeless tobacco. She reports that she does not drink alcohol or use illicit drugs.    ALLERGIES: She is allergic to bee venom; penicillins; dilantin [phenytoin]; iodide; iodine-131; and novocaine  [procaine].    CURRENT MEDICATIONS: She has a current medication list which includes the following prescription(s): ferrous sulfate, lidocaine, blood glucose monitoring, order for dme, order for dme, famotidine, sucralfate, order for dme, pantoprazole, order for dme, oxycodone-acetaminophen, albuterol, sucralfate, order for dme, ondansetron, metoprolol, metoclopramide, cyanocobalamin, gabapentin, rosaura, polyethylene glycol, hydromorphone, fluticasone, advair diskus, calcium citrate-vitamin d, pregabalin, hydrochlorothiazide, lisinopril, escitalopram, estradiol, levetiracetam, trazodone, ropinirole, aspirin, clopidogrel, risperidone, blood glucose monitoring, blood glucose monitoring, phenytoin sodium extended, docusate sodium, tamsulosin, dorzolamide, diazepam, spiriva handihaler, zinc, cetirizine, nitroglycerin, MEDICATION GIVEN BY INTRATHECAL PUMP - INSTRUCTION, latanoprost, atorvastatin, lactulose, order for dme, prochlorperazine, thin, order for dme, order for dme, order for dme, easy touch lancets 30g/twist, vitamin d, and multiple vitamins-minerals, and the following Facility-Administered Medications: neostigmine and glycopyrrolate.     REVIEW OF SYSTEMS: 10 point review of systems is negative except as noted above.     Exam:   There were no vitals taken for this visit.      CONSTITUTIONIAL: alert and no distress  SKIN: no suspicious lesions or rashes  GAIT: wheelchair  NEUROLOGIC: Non-focal  PSYCHIATRIC: mentation appears normal.  RIGHT WRIST:  There is no deformity and no swelling.  Flexion and extension are functional.  She has full pronation and supination.   RIGHT HAND:  There is no deformity.  She is nontender along her metacarpals or MCP joints.  The thumb CMC joint is nontender to palpation.  She has some mild diffuse tenderness along the proximal phalanges, principally of the second, third and fourth digits.  She has no particular bogginess, though she does have some discomfort with full motion in the  PIP joints of the digits 2 through 5.  She has no specific tenderness per se along the DIP joints of the same digits.   LEFT WRIST:  She has functional range of motion.  There is no deformity, no swelling.  She has full pronosupination.     LEFT HAND:  She has mild tenderness at the thumb CMC joint but negative grind test.  She is nontender over the metacarpals or the MCP joints.  She has some very mild diffuse tenderness over the third proximal phalanx.  Otherwise, she has some mild diffuse tenderness of her PIP joints but not of her DIP joints.        Assessment/Plan:   Sonya Foote is a 67-year-old pleasant female with bilateral hand discomfort.  She does have some underlying degenerative changes.  This may be principally degenerative in nature.  However, she has some changes, particularly in her left hand radiograph which are suggestive of possible infiltrative process, particularly the proximal phalanges.  To this end, we will proceed with an MRI of the left hand.  I am also going to get an angiotensin converting enzyme level on her.  Certainly this could be explained by sarcoidosis.  However, she is a little old for presentation of such.  To that end, if her MRI is nonrevealing for an infiltrative process, we will continue to treat her for her osteoarthritis.  Reviewing all of her medications and her multiple interactions, it is most likely we would have to proceed with topical medications for the hand as well as continued superficial heat and a refresher from occupational therapy.  She will return to follow up with me pending her MRI and lab.

## 2017-03-30 NOTE — NURSING NOTE
Called [facility] to get make, model #, and serial number of her pain pump for the radiology department in order for her to undergo an MRI.     [facility] gave the following information:    Make: Medtronic [id number] Syncromed 2  Model #: 8637-40 40 ml pump  Serial #: [id number]

## 2017-03-30 NOTE — MR AVS SNAPSHOT
After Visit Summary   3/30/2017    Sonya Foote    MRN: [id number]           Patient Information     Date Of Birth          1950        Visit Information        Provider Department      3/30/2017 11:15 AM Ky Ramírez MD [facility]        Today's Diagnoses     Bilateral hand pain    -  1       Follow-ups after your visit        Your next 10 appointments already scheduled     Mar 31, 2017 10:30 AM CDT   Office Visit with Elizabeth [facility] ([facility])    [address] [place] [address] [id number] [phone]           Bring a current list of meds and any records pertaining to this visit.  For Physicals, please bring immunization records and any forms needing to be filled out.  Please arrive 10 minutes early to complete paperwork.            Apr 02, 2017  7:45 AM CDT   (Arrive by 7:30 AM)   MR UPPER EXTREMITY NON JOINT LEFT W/O CONTRAST with [id number]   [facility] ([facility])    [address]  1st Floor  [facility] [address] [phone]           Take your medicines as usual, unless your doctor tells you not to. Bring a list of your current medicines to your exam (including vitamins, minerals and over-the-counter drugs). Also bring the results of similar scans you may have had.  Please remove any body piercings and hair extensions before you arrive.  Follow your doctor s orders. If you do not, we may have to postpone your exam.  You will not have contrast for this exam. You do not need to do anything special to prepare.  The MRI machine uses a strong magnet. Please wear clothes without metal (snaps, zippers). A sweatsuit works well, or we may give you a hospital gown.   **IMPORTANT** THE INSTRUCTIONS BELOW ARE ONLY FOR THOSE PATIENTS WHO HAVE BEEN TOLD THEY WILL RECEIVE SEDATION OR GENERAL ANESTHESIA DURING THEIR MRI PROCEDURE:  IF YOU WILL RECEIVE SEDATION  (take medicine to help you relax during your exam):   You must get the medicine from your doctor before you arrive. Bring the medicine to the exam. Do not take it at home.   Arrive one hour early. Bring someone who can take you home after the test. Your medicine will make you sleepy. After the exam, you may not drive, take a bus or take a taxi by yourself.   No eating 8 hours before your exam. You may have clear liquids up until 4 hours before your exam. (Clear liquids include water, clear tea, black coffee and fruit juice without pulp.)  IF YOU WILL RECEIVE ANESTHESIA (be asleep for your exam):   Arrive 1 1/2 hours early. Bring someone who can take you home after the test. You may not drive, take a bus or take a taxi by yourself.   No eating 8 hours before your exam. You may have clear liquids up until 4 hours before your exam. (Clear liquids include water, clear tea, black coffee and fruit juice without pulp.)   You will spend four to five hours in the recovery room.  Please call the Imaging Department at your exam site with any questions.            Apr 03, 2017 11:15 AM CDT   (Arrive by 11:00 AM)   Return Visit with Ky Ramírez MD   [facility] ([facility])    [address]  5th Floor  [facility] [address]   [phone]            Apr 04, 2017  2:45 PM CDT   Return Visit with Bj Anderson MD   [facility] [place] ([facility])    [address] [place] [address] [address]  [facility] [address]   [phone]            Apr 26, 2017  8:40 AM CDT   (Arrive by 8:25 AM)   NEW HEART FAILURE with Radha Aldrich MD   [facility] ([facility])    [address]  3rd Floor  [facility] [address]   [phone]            May 08, 2017  1:00 PM CDT   Nurse Visit with UA NURSE   [facility] [place] ([facility])    [address]  [address]  [facility] [address]   [phone]            May 12, 2017 10:50 AM CDT   (Arrive by 10:35 AM)   RETURN DIABETES with Michelle Irizarry MD   [facility] ([facility])    [address]  [address] [facility] [address]   [phone]            May 18, 2017  1:00 PM CDT   (Arrive by 12:45 PM)   Cystoscopy with Elizabeth Oliver MD   [facility] ([facility])    [address]  [address] [facility] [address] [phone]            Jul 10, 2017 11:00 AM CDT   (Arrive by 10:45 AM)   Return Visit with Alina Jennings MD   [facility] ([facility])    [address]  [address] [facility] [address] [phone]              Future tests that were ordered for you today     Open Future Orders        Priority Expected Expires Ordered    MRI Upper extremity non joint without contast lt* Routine  3/30/2018 3/30/2017            Who to contact     Please call your clinic at [phone] to:    Ask questions about your health    Make or cancel appointments    Discuss your medicines    Learn about your test results    Speak to your doctor   If you have compliments or concerns about an experience at your clinic, or if you wish to file a complaint, please contact [facility] Patient Relations at [phone] or email us at [email or web address]         Additional Information About Your Visit        [email or web address] Information     [employer] is an electronic gateway that provides easy, online access to your medical records. With [employer], you can request a clinic appointment, read your test results, renew a prescription or communicate with your care team.     To sign up for [employer] visit the website at [email or web address]   You will be asked to enter the access code  listed below, as well as some personal information. Please follow the directions to create your username and password.     Your access code is: [email or web address]  Expires: 2017 11:52 AM     Your access code will  in 90 days. If you need help or a new code, please contact your [facility] or call [phone] for assistance.        Care EveryWhere ID     This is your Care EveryWhere ID. This could be used by other organizations to access your [place] medical records  [id number]         Blood Pressure from Last 3 Encounters:   17 138/70   03/15/17 149/81   17 126/68    Weight from Last 3 Encounters:   17 60.3 kg (133 lb)   03/15/17 61.2 kg (135 lb)   17 59 kg (130 lb)                 Today's Medication Changes          These changes are accurate as of: 3/30/17 11:52 AM.  If you have any questions, ask your nurse or doctor.               These medicines have changed or have updated prescriptions.        Dose/Directions    atorvastatin 40 MG tablet   Commonly known as:  LIPITOR   This may have changed:  when to take this   Used for:  Hyperlipidemia LDL goal <100        Dose:  40 mg   Take 1 tablet (40 mg) by mouth daily   Quantity:  90 tablet   Refills:  3       clopidogrel 75 MG tablet   Commonly known as:  PLAVIX   This may have changed:  when to take this   Used for:  PAD (peripheral artery disease) (H), UGI bleed, Osteoporosis, Nausea        Dose:  75 mg   Take 1 tablet (75 mg) by mouth daily   Quantity:  90 tablet   Refills:  3       levETIRAcetam 500 MG tablet   Commonly known as:  KEPPRA   This may have changed:  when to take this   Used for:  Nausea        Dose:  500 mg   Take 1 tablet (500 mg) by mouth 2 times daily   Quantity:  180 tablet   Refills:  1       Phenytoin Sodium Extended 200 MG Caps   This may have changed:  additional instructions   Used for:  Seizure disorder (H)        Dose:  200 mg   Take 200 mg by mouth 2 times daily   Quantity:   60 capsule   Refills:  0                Primary Care Provider Office Phone # Fax #    Allan Casey [phone]       [facility] [address] [facility] [address]        Thank you!     Thank you for choosing [facility]  for your care. Our goal is always to provide you with excellent care. Hearing back from our patients is one way we can continue to improve our services. Please take a few minutes to complete the written survey that you may receive in the mail after your visit with us. Thank you!             Your Updated Medication List - Protect others around you: Learn how to safely use, store and throw away your medicines at www.disposemymeds.org.          This list is accurate as of: 3/30/17 11:52 AM.  Always use your most recent med list.                   Brand Name Dispense Instructions for use    ADVAIR DISKUS 250-50 MCG/DOSE diskus inhaler   Generic drug:  fluticasone-salmeterol      Inhale 2 puffs into the lungs daily       albuterol (2.5 MG/3ML) 0.083% neb solution     360 mL    INHALE 1 VIAL VIA NEBULIZER EVERY 6 HOURS AS NEEDED       aspirin 81 MG EC tablet     90 tablet    Take 1 tablet (81 mg) by mouth daily       atorvastatin 40 MG tablet    LIPITOR    90 tablet    Take 1 tablet (40 mg) by mouth daily       blood glucose monitoring meter device kit     1 kit    Use to test blood sugars 3 times daily or as directed.       blood glucose monitoring test strip    ONE TOUCH ULTRA    3 Box    Use to test blood sugars 3 times daily or as directed.       calcium citrate-vitamin D 315-250 MG-UNIT Tabs per tablet    CITRACAL    120 tablet    Take 2 tablets by mouth daily       cetirizine 10 MG tablet    zyrTEC    90 tablet    Take 1 tablet (10 mg) by mouth every evening       clopidogrel 75 MG tablet    PLAVIX    90 tablet    Take 1 tablet (75 mg) by mouth daily       CYANOCOBALAMIN PO      Take 2,000 mcg by mouth daily       diazepam 5 MG tablet    VALIUM     20 tablet    Take 1 tablet (5 mg) by mouth every 6 hours as needed for anxiety       DOCUSATE SODIUM PO      Take 100 mg by mouth daily       dorzolamide 2 % ophthalmic solution    TRUSOPT     Place 1 drop into both eyes 2 times daily       * EASY TOUCH LANCETS 30G/TWIST Misc          * thin lancets    NO BRAND SPECIFIED    1 Box    Use to test blood sugar 3 times daily or as directed.       * blood glucose monitoring lancets     3 Box    Use to test blood sugar 3 times daily or as directed.       escitalopram 20 MG tablet    LEXAPRO    90 tablet    One per day       estradiol 1 MG tablet    ESTRACE    90 tablet    Take 1 tablet (1 mg) by mouth daily       famotidine 20 MG tablet    PEPCID    60 tablet    Take 1 tablet (20 mg) by mouth 2 times daily       ferrous sulfate 325 (65 FE) MG tablet    IRON    60 tablet    Take 1 tablet (325 mg) by mouth 2 times daily With meals       FLOMAX 0.4 MG capsule   Generic drug:  tamsulosin      Take 0.4 mg by mouth At Bedtime       fluticasone 50 MCG/ACT spray    FLONASE     Spray 1 spray into both nostrils daily       gabapentin 300 MG capsule    NEURONTIN    90 capsule    Take 1 capsule (300 mg) by mouth 3 times daily       hydrochlorothiazide 12.5 MG capsule    MICROZIDE    90 capsule    Take 1 capsule (12.5 mg) by mouth daily       HYDROmorphone 2 MG tablet    DILAUDID    30 tablet    Take 1 tablet (2 mg) by mouth every 3 hours as needed for moderate to severe pain       RENÉE Pack      Take 1 packet by mouth 2 times daily       lactulose 10 GM/15ML solution    CHRONULAC    946 mL    Take 30 mLs (20 g) by mouth daily as needed for constipation       latanoprost 0.005 % ophthalmic solution    XALATAN    2.5 mL    Place 1 drop into both eyes At Bedtime       levETIRAcetam 500 MG tablet    KEPPRA    180 tablet    Take 1 tablet (500 mg) by mouth 2 times daily       lidocaine 5 % Patch    LIDODERM    30 patch    Apply from 1 to 3 patches to painful area at once for up to 12 h  within a 24 h period.  Remove after 12 hours.       lisinopril 2.5 MG tablet    PRINIVIL/Zestril    90 tablet    Take 1 tablet (2.5 mg) by mouth daily       MEDICATION GIVEN BY INTRATHECAL PUMP - INSTRUCTION      continuous 4/11/2016 per [facility] [place] [phone]: Conc: Bupivacaine 20 mg/mL and Fentanyl 2000 mcg/mL. Continuous: Bupivacaine 5.052 mg/day and Fentanyl 505.2 mcg/day. Boluses: Up to 7 boluses per 24-hr period of Bupivicaine 0.5992 mg and Fentanyl 59.9 mcg Max daily dose: Bupivacaine 9.1973 mg/day and Fentanyl 919.5883 mcg/day  Pump Last Fill Date:  6/30/2016 Pump Refill Date: 9/20/2016       metoclopramide 5 MG tablet    REGLAN    240 tablet    Take 1 tablet (5 mg) by mouth 3 times daily (before meals)       metoprolol 25 MG 24 hr tablet    TOPROL-XL    30 tablet    Take 1 tablet (25 mg) by mouth daily       MULTIVITAMIN PO      Take 1 tablet by mouth daily       nitroglycerin 0.4 MG sublingual tablet    NITROSTAT    25 tablet    Place 1 tablet (0.4 mg) under the tongue every 5 minutes as needed for chest pain if you are still having symptoms after 3 doses (15 minutes) call 911.       ondansetron 4 MG ODT tab    ZOFRAN-ODT    120 tablet    Take 1 tablet (4 mg) by mouth every 6 hours       * order for DME     1 Month    Equipment being ordered: Depends briefs       * order for DME     1 Device    Equipment being ordered: Power Wheelchair       * order for DME     1 Units    Equipment being ordered: Nebulizer and supplies       * order for DME     1 Box    Equipment being ordered: Glucerna daily shakes with each meal       * order for DME     1 Device    ACcu check BID       * order for DME     1 Units    Equipment being ordered: Nebulizer and tubing supplies       * order for DME     1 Device    Equipment being ordered: CPAP supplies mask, hose, filters, cushion fax to [facility] at [phone] Disp: 10 DeviceRefills: prn Class: Local PrintStart: 2/10/2017        * order for DME     10 Device    Equipment being ordered: CPAP supplies mask, hose, filters, cushion  fax to [facility] at [phone]       * order for DME     1 Device    Equipment being ordered: wheelchair seat cushion       oxyCODONE-acetaminophen 5-325 MG per tablet    PERCOCET    60 tablet    Take 1-2 tablets every 3 hours as needed for pain.       pantoprazole 40 MG EC tablet    PROTONIX    60 tablet    Take 1 tablet (40 mg) by mouth 2 times daily Then QD       Phenytoin Sodium Extended 200 MG Caps     60 capsule    Take 200 mg by mouth 2 times daily       polyethylene glycol powder    MIRALAX/GLYCOLAX     Take 17 g by mouth daily       pregabalin 75 MG capsule    LYRICA    90 capsule    Take 1 capsule (75 mg) by mouth 2 times daily (takes at 8 AM and 8 PM)       prochlorperazine 10 MG tablet    COMPAZINE    20 tablet    Take 1 tablet (10 mg) by mouth every 8 hours as needed       risperiDONE 1 MG tablet    risperDAL    90 tablet    Take 0.5 tablets (0.5 mg) by mouth At Bedtime       rOPINIRole 0.25 MG tablet    REQUIP    270 tablet    Take 3 tablets (0.75 mg) by mouth At Bedtime       SPIRIVA HANDIHALER 18 MCG capsule   Generic drug:  tiotropium     30 capsule    INHALE THE CONTENTS OF 1 CAPSULE VIA INHALATION DEVICE DAILY       * sucralfate 1 GM/10ML suspension    CARAFATE    1200 mL    Take 10 mLs (1 g) by mouth 4 times daily (before meals and nightly)       * sucralfate 1 GM tablet    CARAFATE    40 tablet    Take 1 tablet (1 g) by mouth 4 times daily May dissolve in 10 mL water is necessary. (Start upon completion of carafate suspension)       traZODone 100 MG tablet    DESYREL    90 tablet    Take 1 tablet (100 mg) by mouth At Bedtime       vitamin D 2000 UNITS tablet     100 tablet    Take 2,000 Units by mouth daily.       zinc 50 MG Tabs      Take 1 tablet by mouth daily       * Notice:  This list has 14 medication(s) that are the same as other medications prescribed for you. Read the directions  carefully, and ask your doctor or other care provider to review them with you.

## 2017-03-30 NOTE — LETTER
3/30/2017      RE: Sonya Foote  [address]   [place] [facility] [id number]        Subjective:   Sonya Foote is a 67 year old female who is being seen for bilateral hand pain. She reports she has had pain for years but over the last 6-8 months the hands have gotten worse. She has a history of right wrist surgery to repair a fracture in 1990.    Sonya has an unfortunate past medical history and multiple medical problems.  She recently underwent a lower extremity amputation for peripheral vascular disease.  She states that she has had bilateral hand discomfort and aching for several years.  She was previously seen by Dr. Arroyo and had radiographs which demonstrated some degenerative changes.  She did see occupational therapy and did have some benefit.  She is on multiple neuroactive medications.  She is also on intermittent pain medications.  She states that warm or superficial heat feels better than cold.  She notes that weather changes seem to be aggravating as well.  She is here to see if there is anything further that can be done to help assist with her overall discomfort in her hands.  She states most of her discomfort seems to be in the proximal phalanges and middle phalanges.  Mostly it is her PIP joints which are mostly bothersome.  She does state that she had a prior Dupuytren release on her right but that has not been problematic since that time.       Background:   Date of injury: NA   Duration of symptoms: 8 months  Mechanism of Injury: Insidious Onset; Unknown   Aggravating factors: nights, picking up stuff, use of hands  Relieving Factors: heat  Prior Evaluation: Prior Physician Evalutation    PAST MEDICAL, SOCIAL, SURGICAL AND FAMILY HISTORY: She  has a past medical history of Anemia; Antiplatelet or antithrombotic long-term use; Arthritis; AS (sickle cell trait) (H) (10/8/2013); Blind left eye; BPPV (benign paroxysmal positional vertigo); Chronic back pain; COPD (chronic obstructive  pulmonary disease) (H); Coronary artery disease; CVA (cerebral infarction) (10/8/2012); Diastolic CHF (H) (9/4/2012); Dilantin toxicity (5/31/2013); Esophageal candidiasis (H); GERD (gastroesophageal reflux disease); Heart attack (H); Hernia, abdominal; History of blood transfusion; History of thrombophlebitis; HTN (hypertension) (9/4/2012); Hyperlipidemia LDL goal <100 (9/4/2012); Legally blind in right eye, as defined in [place]; Osteoporosis (1/21/2013); Other chronic pain; PAD (peripheral artery disease) (H); Palpitations; Person who has had sex change operation (1/20/2014); Pneumonia; Schizoaffective disorder (H); Seizure disorder (H) (9/4/2012); Seizures (H); Sleep apnea; Thrombosis of leg; Type 2 diabetes, HbA1C goal < 8% (H) (9/4/2012); Uncomplicated asthma; and Unspecified cerebral artery occlusion with cerebral infarction. She also has no past medical history of Asymptomatic human immunodeficiency virus (HIV) infection status (H); Cerebral palsy (H); Congenital renal agenesis and dysgenesis; Difficult intubation; Goiter; Gout; History of spinal cord injury; Horseshoe kidney; Hydrocephalus; Malignant hyperthermia; Mumps; Parkinsons disease (H); Spider veins; Spina bifida (H); STD (sexually transmitted disease); Tethered cord (H); or Tuberculosis.  She  has a past surgical history that includes appendectomy; tonsillectomy; Cholecystectomy; orthopedic surgery; Breast surgery; sinus surgery; Esophagoscopy, gastroscopy, duodenoscopy (EGD), combined (12/14/2012); vascular surgery; Esophagoscopy, gastroscopy, duodenoscopy (EGD), combined (12/31/2013); Sex transformation, male to female; Esophagoscopy, gastroscopy, duodenoscopy (EGD), combined (4/1/2014); Endarterectomy femoral (5/23/2014); Esophagoscopy, gastroscopy, duodenoscopy (EGD), combined (6/28/2014); Colonoscopy with CO2 insufflation (N/A, 8/20/2014); Esophagoscopy, gastroscopy, duodenoscopy (EGD), combined (N/A, 8/20/2014); Esophagoscopy, gastroscopy,  duodenoscopy (EGD), combined (N/A, 8/22/2014); CAPSULE ENDOSCOPY (N/A, 8/25/2014); Colonoscopy (N/A, 8/25/2014); CAPSULE ENDOSCOPY (N/A, 10/2/2014); Esophagoscopy, gastroscopy, duodenoscopy (EGD), combined (N/A, 10/2/2014); Angiogram (Bilateral, 11/21/2014); Esophagoscopy, gastroscopy, duodenoscopy (EGD), combined (Left, 12/15/2014); Angiogram (Left, 1/16/2015); Esophagoscopy, gastroscopy, duodenoscopy (EGD), combined (N/A, 2/25/2015); Esophagoscopy, gastroscopy, duodenoscopy (EGD), combined (Left, 2/25/2015); Angiogram (Bilateral, 9/14/2015); Angiogram (Left, 10/12/2015); Amputate toe(s) (Right, 1/5/2016); Amputate leg above knee (Left, 6/11/2016); Fasciotomy lower extremity (Left, 6/10/2016); Angiogram (Right, 6/6/2016); Angioplasty (Right, 6/6/2016); Esophagoscopy, gastroscopy, duodenoscopy (EGD), combined (N/A, 9/25/2016); Amputate leg below knee (Right, 11/7/2016); Amputate revision stump lower extremity (Right, 11/11/2016); Amputate revision stump lower extremity (Right, 11/16/2016); and Esophagoscopy, gastroscopy, duodenoscopy (EGD), combined (N/A, 1/18/2017).  Her family history includes Breast Cancer in her sister; CANCER in her maternal aunt and maternal aunt; CEREBROVASCULAR DISEASE in her brother; Coronary Artery Disease in her mother; DIABETES in her father, maternal grandfather, maternal grandmother, mother, paternal grandfather, paternal grandmother, and sister; Dementia in her mother; Depression in her brother and sister; Glaucoma in her father, maternal grandfather, maternal grandmother, mother, paternal grandfather, and paternal grandmother; Heart Failure in her father; Schizophrenia in her brother; Suicide in her sister.  She reports that she quit smoking about 16 years ago. Her smoking use included Cigarettes and Cigars. She has a 75.00 pack-year smoking history. She has never used smokeless tobacco. She reports that she does not drink alcohol or use illicit drugs.    ALLERGIES: She is allergic  to bee venom; penicillins; dilantin [phenytoin]; iodide; iodine-131; and novocaine [procaine].    CURRENT MEDICATIONS: She has a current medication list which includes the following prescription(s): ferrous sulfate, lidocaine, blood glucose monitoring, order for dme, order for dme, famotidine, sucralfate, order for dme, pantoprazole, order for dme, oxycodone-acetaminophen, albuterol, sucralfate, order for dme, ondansetron, metoprolol, metoclopramide, cyanocobalamin, gabapentin, rosaura, polyethylene glycol, hydromorphone, fluticasone, advair diskus, calcium citrate-vitamin d, pregabalin, hydrochlorothiazide, lisinopril, escitalopram, estradiol, levetiracetam, trazodone, ropinirole, aspirin, clopidogrel, risperidone, blood glucose monitoring, blood glucose monitoring, phenytoin sodium extended, docusate sodium, tamsulosin, dorzolamide, diazepam, spiriva handihaler, zinc, cetirizine, nitroglycerin, MEDICATION GIVEN BY INTRATHECAL PUMP - INSTRUCTION, latanoprost, atorvastatin, lactulose, order for dme, prochlorperazine, thin, order for dme, order for dme, order for dme, easy touch lancets 30g/twist, vitamin d, and multiple vitamins-minerals, and the following Facility-Administered Medications: neostigmine and glycopyrrolate.     REVIEW OF SYSTEMS: 10 point review of systems is negative except as noted above.     Exam:   There were no vitals taken for this visit.      CONSTITUTIONIAL: alert and no distress  SKIN: no suspicious lesions or rashes  GAIT: wheelchair  NEUROLOGIC: Non-focal  PSYCHIATRIC: mentation appears normal.  RIGHT WRIST:  There is no deformity and no swelling.  Flexion and extension are functional.  She has full pronation and supination.   RIGHT HAND:  There is no deformity.  She is nontender along her metacarpals or MCP joints.  The thumb CMC joint is nontender to palpation.  She has some mild diffuse tenderness along the proximal phalanges, principally of the second, third and fourth digits.  She has no  particular bogginess, though she does have some discomfort with full motion in the PIP joints of the digits 2 through 5.  She has no specific tenderness per se along the DIP joints of the same digits.   LEFT WRIST:  She has functional range of motion.  There is no deformity, no swelling.  She has full pronosupination.     LEFT HAND:  She has mild tenderness at the thumb CMC joint but negative grind test.  She is nontender over the metacarpals or the MCP joints.  She has some very mild diffuse tenderness over the third proximal phalanx.  Otherwise, she has some mild diffuse tenderness of her PIP joints but not of her DIP joints.        Assessment/Plan:   Sonya Foote is a 67-year-old pleasant female with bilateral hand discomfort.  She does have some underlying degenerative changes.  This may be principally degenerative in nature.  However, she has some changes, particularly in her left hand radiograph which are suggestive of possible infiltrative process, particularly the proximal phalanges.  To this end, we will proceed with an MRI of the left hand.  I am also going to get an angiotensin converting enzyme level on her.  Certainly this could be explained by sarcoidosis.  However, she is a little old for presentation of such.  To that end, if her MRI is nonrevealing for an infiltrative process, we will continue to treat her for her osteoarthritis.  Reviewing all of her medications and her multiple interactions, it is most likely we would have to proceed with topical medications for the hand as well as continued superficial heat and a refresher from occupational therapy.  She will return to follow up with me pending her MRI and lab.       Ky Ramírez MD

## 2017-03-31 ENCOUNTER — OFFICE VISIT (OUTPATIENT)
Dept: PHARMACY | Facility: CLINIC | Age: 67
End: 2017-03-31
Payer: COMMERCIAL

## 2017-03-31 DIAGNOSIS — F43.21 ADJUSTMENT DISORDER WITH DEPRESSED MOOD: ICD-10-CM

## 2017-03-31 DIAGNOSIS — I25.10 ASCVD (ARTERIOSCLEROTIC CARDIOVASCULAR DISEASE): ICD-10-CM

## 2017-03-31 DIAGNOSIS — I70.229 ATHEROSCLEROTIC PERIPHERAL VASCULAR DISEASE WITH REST PAIN (H): ICD-10-CM

## 2017-03-31 DIAGNOSIS — E11.51 TYPE 2 DIABETES MELLITUS WITH DIABETIC PERIPHERAL ANGIOPATHY WITHOUT GANGRENE, WITHOUT LONG-TERM CURRENT USE OF INSULIN (H): ICD-10-CM

## 2017-03-31 DIAGNOSIS — K59.01 SLOW TRANSIT CONSTIPATION: ICD-10-CM

## 2017-03-31 DIAGNOSIS — G40.909 SEIZURE DISORDER (H): ICD-10-CM

## 2017-03-31 DIAGNOSIS — G89.4 CHRONIC PAIN SYNDROME: ICD-10-CM

## 2017-03-31 DIAGNOSIS — R11.0 NAUSEA: ICD-10-CM

## 2017-03-31 DIAGNOSIS — Z87.890: ICD-10-CM

## 2017-03-31 DIAGNOSIS — E63.9 NUTRITIONAL DEFICIENCY: ICD-10-CM

## 2017-03-31 DIAGNOSIS — D64.9 ANEMIA, UNSPECIFIED TYPE: ICD-10-CM

## 2017-03-31 DIAGNOSIS — I50.22 CHRONIC SYSTOLIC CONGESTIVE HEART FAILURE (H): ICD-10-CM

## 2017-03-31 DIAGNOSIS — K21.9 GASTROESOPHAGEAL REFLUX DISEASE WITHOUT ESOPHAGITIS: ICD-10-CM

## 2017-03-31 DIAGNOSIS — J44.9 CHRONIC OBSTRUCTIVE PULMONARY DISEASE, UNSPECIFIED COPD TYPE (H): Primary | Chronic | ICD-10-CM

## 2017-03-31 DIAGNOSIS — G47.01 INSOMNIA DUE TO MEDICAL CONDITION: ICD-10-CM

## 2017-03-31 DIAGNOSIS — R35.0 URINARY FREQUENCY: ICD-10-CM

## 2017-03-31 DIAGNOSIS — E78.5 HYPERLIPIDEMIA LDL GOAL <100: ICD-10-CM

## 2017-03-31 DIAGNOSIS — J30.2 SEASONAL ALLERGIC RHINITIS, UNSPECIFIED ALLERGIC RHINITIS TRIGGER: ICD-10-CM

## 2017-03-31 DIAGNOSIS — F25.9 SCHIZOAFFECTIVE DISORDER, UNSPECIFIED TYPE (H): ICD-10-CM

## 2017-03-31 DIAGNOSIS — I10 ESSENTIAL HYPERTENSION WITH GOAL BLOOD PRESSURE LESS THAN 130/80: ICD-10-CM

## 2017-03-31 DIAGNOSIS — G25.81 RESTLESS LEGS SYNDROME (RLS): ICD-10-CM

## 2017-03-31 LAB — ACE SERPL-CCNC: 13 U/L

## 2017-03-31 PROCEDURE — 99605 MTMS BY PHARM NP 15 MIN: CPT | Performed by: PHARMACIST

## 2017-03-31 PROCEDURE — 99607 MTMS BY PHARM ADDL 15 MIN: CPT | Performed by: PHARMACIST

## 2017-03-31 RX ORDER — PREGABALIN 100 MG/1
100 CAPSULE ORAL 2 TIMES DAILY
COMMUNITY
End: 2017-05-25 | Stop reason: DRUGHIGH

## 2017-03-31 RX ORDER — PANTOPRAZOLE SODIUM 40 MG/1
40 TABLET, DELAYED RELEASE ORAL DAILY
Status: ON HOLD | COMMUNITY
End: 2017-11-27

## 2017-03-31 RX ORDER — RISPERIDONE 0.5 MG/1
0.5 TABLET ORAL AT BEDTIME
COMMUNITY
End: 2019-06-05

## 2017-03-31 NOTE — Clinical Note
Hi , It has been a long time!  Hope all is well with you.  Could you take a look at recommendations from my MTM visit with patient today and let me know if you are ok with suggestions?  If you are, I can get the changes to the assisted living and talk to patient about them. I can also plan to follow-up with her regularly to see how the changes go.  Please let me know.  Thanks so much!

## 2017-03-31 NOTE — PROGRESS NOTES
"SUBJECTIVE/OBJECTIVE:                                                    Sonya Foote is a 67 year old female seen at their home for an initial visit for Medication Therapy Management.  She was referred to me from [facility].     Chief Complaint: initial medication review; significant polypharmacy.    Allergies/ADRs: Reviewed in Epic  Tobacco: History of tobacco dependence - quit several years ago   Alcohol: none  Activity: ambulates by wheelchair; both legs amputated  PMH: Reviewed in Epic    Medication Adherence: has assistance setting up med boxes    HFrEF/HTN/CAD: Current medications include HCTZ 12.5mg daily  metoprolol succinate 25mg daily  lisinopril 2.5mg once daily.  Also on ASA 81mg daily, Plavix 75mg daily, NTG prn. Patient reports the following potential medication side effects: orthostatic lightheadedness, nausea and excessive urination.     ECHO:  Date 12/22/2016, EF 35-40%  Pt is complaining of sx of HFof: shortness of breath, although also has diagnosis of COPD.  Occasional chest pain and palpitations. States last used NTG tabs approx 3 weeks ago.  Pt is not measuring daily weights.   Patient has BP monitored by [facility] home.  These readings are not available to me today.  Pt reports that they are variable, have been running \"lower\" lately.  Pt is following a low sodium diet, is avoiding EtOH.  History of multiple CVAs and MIs per patient.  Stent placement in September 2016.      PVD: Current medications include Plavix and ASA.  Both legs amputated.  H/o anemia, bruises easily, but due to CV history, continues on dual antiplatelet therapy.    Hyperlipidemia: Current therapy includes Atorvastatin 40mg once daily.  Pt reports no significant myalgias or other side effects.     Pain: Current medication includes pump therapy with Fentanyl for back pain; h/o DJD/lower back pain.  Also on Hydromorphone 2mg q3hr prn, Gabapentin 300mg tid, Lyrica 100mg bid for neuropathy and phantom limb pain. Recently " "started Lidocaine patches which she finds very helpful. Notes Gabapentin was not helpful at all for her, was taking this prior to Lyrica use, and finds Lyrica helpful. Reports that Hydromorphone 2mg is not giving her enough relief.  Appears per her MAR that she has been getting it 2-4 times daily.  Reports she will be getting MRI on hands early next week due to increased pain/inflammation.  Potential medication side effects she is experiencing: nausea, edema, falls.    Depression/Anxiety/Schizoaffective/Insomnia:  Current medications include: Escitalopram 20mg once daily and Risperidone 0.5mg at bedtime, Trazodone 100mg at bedtime, Diazepam 5mg q6h prn for anxiety (does not use this). Pt reports that depression symptoms are managed ok currently.  States her mood is \"pretty good, I try to stay upbeat.\"  Pt and  that she does struggle with anxiety, but she manages this well.   talks to her frequently throughout the week due to patient calls.  Pt reports she sleeps about 5-6hrs at night, uses Cpap.  Will often awake in a.m. With pain or a headache.  Feels she needs current Trazodone dose to help her sleep.  PHQ-9 SCORE 1/16/2013 3/8/2016 3/6/2017   Total Score 9 - -   Total Score - 4 9     Constipation: Current medication includes Docusate 100mg daily, Miralax 17gm daily, prn Lactulose (has not been using).  Denies problems with constipation & will have BM approx 3 times daily.    ERT: Current medication includes Estradiol 1mg daily. Pt continues on this due to sex change.  States was born with both female and male parts, has always identified more as a male, but when young, father \"forced\" her to be a girl.  Notes she was on higher doses of Estradiol in past.  Potential medication side effects she is experiencing: nausea, edema, HTN, history of CVA and MI.    GERD/nausea: Current medications include: Protonix (pantoprazole) 40mg once daily, Sucralfate 1gm qid.  Also on prn Zofran ODT and " prn Prochlorperazine. The patient does have a history of GI bleed, multiple hospitalizations related to bleed, history of ulcer.  Notes history of anemia, blood transfusion.  States nausea has always been a significant problem for her.    Anemia: Current medication includes Ferrous Sulfate 325mg bid, B12 supp. Hgb on 3/23/17 = 11.1. H/o sickle cell trait.    Supplements:  Current medication includes Vitamin B12 2000mcg daily, Vitamin D 2000u daily, Zinc sulfate 50mg once daily, MVI daily, Calcium/vit D daily and [employer] twice daily due to poor appetite. Has had a couple falls since last amputation.  History of osteoporosis.    RLS:  Current medication includes Ropinirole 0.75mg nightly.  As noted, both legs amputated, and pt reports no need for Ropinirole any longer.  RLS no longer an issue.  Potential medication side effects she is experiencing: edema, insomnia, nausea, frequent UTIs.    Urinary frequency/incontinence:  Current medication includes Tamsulosin 0.4mg daily.  Pt notes she does have a prostate.  Tamsulosin was initiated due to urinary retention, but now notes significant increase in urinary frequency/incontinence.  Reports she will be evaluated for possible catheter. Has tried and failed several meds for incontinence per pt report. States h/o frequent UTIs.      Allergic rhinitis: Current medications include cetirizine 10mg once daily and fluticasone nasal spray 1 spray(s) in each nostril once daily. Primary symptoms are runny nose and sneezing. Pt feels that current therapy is effective.      COPD: Current medications: Albuterol Nebs and (Pt reports using albuterol 4 times per day), Tiotropium (Spiriva) once daily and Fluticasone+Salmeterol (Advair) 1 puff twice daily Pt rinses their mouth after using steroid inhaler.  She states if she uses her Albuterol nebs any less than four times daily, she will cough frequently.  Pt is experiencing the following potential med-related side effects: occasional rapid  heart rate.    Pt reports the following symptoms: SOB.  Pt does not have an COPD Action Plan on file; has Asthma Action Plan on file.   Has spirometry been completed: Yes , 3/2015    Seizures:  Current medication includes Keppra 500mg bid, Phenytek 200mg bid. Reports the Gabapentin and Lyrica were started for pain, not seizure control. Notes a history of allergic reaction to generic Dilantin which was almost fatal for her.  Per patient report, she states 3-4 times daily she will have a petit mal seizure, but reports grand mal controlled.  Followed by neurology.        Current labs include:  BP Readings from Last 3 Encounters:   03/23/17 138/70   03/15/17 149/81   03/06/17 126/68     Today's Vitals: There were no vitals taken for this visit.  Lab Results   Component Value Date    A1C 4.1 01/24/2017   .  Lab Results   Component Value Date    CHOL 155 06/06/2016     Lab Results   Component Value Date    TRIG 62 06/06/2016     Lab Results   Component Value Date    HDL 65 06/06/2016     Lab Results   Component Value Date    LDL 77 06/06/2016       Liver Function Studies -   Recent Labs   Lab Test  01/24/17   1156   PROTTOTAL  7.4   ALBUMIN  3.0*   BILITOTAL  0.1*   ALKPHOS  162*   AST  29   ALT  35       Lab Results   Component Value Date    UCRR 54 02/05/2016    MICROL 6 02/05/2016    UMALCR 10.72 02/05/2016       Last Basic Metabolic Panel:  Lab Results   Component Value Date     01/24/2017      Lab Results   Component Value Date    POTASSIUM 3.9 01/24/2017     Lab Results   Component Value Date    CHLORIDE 108 01/24/2017     Lab Results   Component Value Date    BUN 9 01/24/2017     Lab Results   Component Value Date    CR 0.45 01/24/2017     GFR Estimate   Date Value Ref Range Status   01/24/2017 >90  Non  GFR Calc   >60 mL/min/1.7m2 Final   12/28/2016 >90  Non  GFR Calc   >60 mL/min/1.7m2 Final   12/27/2016 >90  Non  GFR Calc   >60 mL/min/1.7m2 Final     GFR  Estimate If Black   Date Value Ref Range Status   01/24/2017 >90   GFR Calc   >60 mL/min/1.7m2 Final   12/28/2016 >90   GFR Calc   >60 mL/min/1.7m2 Final   12/27/2016 >90   GFR Calc   >60 mL/min/1.7m2 Final     TSH   Date Value Ref Range Status   11/04/2016 0.84 0.40 - 4.00 mU/L Final   ]    Most Recent Immunizations   Administered Date(s) Administered     Influenza (High Dose) 3 valent vaccine 09/03/2016     Influenza (IIV3) 09/01/2013     Pneumococcal (PCV 13) 10/22/2015     Pneumococcal 23 valent 02/22/2014     TD (ADULT, 7+) 01/01/2011     ASSESSMENT:                                                       Current medications were reviewed today.     Medication Adherence: no issues identified      HFrEF/HTN/CAD: Stable. Patient is meeting BP goal of < 140/90mmHg.  Pt is on appropriate ACE/ARB and beta blocker.  Of note, on several meds that can increase QTc interval, and pt's last EKG indicates prolonged QTc - see below.     PVD: Stable.  Continue to follow Hgb.  In future, may want to consider d'c of ASA, but continue with Plavix (due to history of anemia, g.i. Bleed).    Hyperlipidemia: Stable.    Pain: May benefit from taper and d'c of Gabapentin.  Duplicate therapy with Gabapentin and Lyrica currently, significant polypharm exists, and pt would like to d'c.  May be contributing to edema, falls, confusion.    Depression/Anxiety/Schizoaffective/Insomnia: Would benefit from d'c of prn Diazepam.  Does not use this, and is high risk med in elderly, increasing risk of confusion/memory problems, falls, fractures, sedation, etc. Pt prefers to d'c.  In future, will follow-up with pt regarding potential reduction in Lexapro dose.  May increase risk of QTc prolongation, especially in combo with her other meds, such as Risperidone, Trazodone, antiemetics.  Additionally, she is on > max dose of 10mg recommended for pt's 60 and older.    Constipation: Would benefit from d'c of  Docusate and prn Lactulose; having 3 BMs a day currently.    ERT: May be of benefit to consider reduction in Estradiol dose due to pt's history of MI and CVA.  She is willing to try this, with monitoring of side effects of taper (inc male horomone related se's).    GERD/nausea: Discussed taking meds with food to help with nausea.  Pt on PPI as well as Sucralfate.  May be of benefit to begin reduction in Sucralfate dose.    Anemia:  Stable.  In future, may want to consider d'c of ASA if Hgb begins to decrease again.    Supplements:  Stable.    RLS:  Ropinirole no longer necessary, and pt prefers taper/d'c.  May also be contributing to CNS se's, edema, HF symptoms, insomnia, UTIs.    Urinary frequency/incontinence:  While awaiting if pt will begin catheter use, may be of benefit to consider change in Tamsulosin to every other day since pt notes increased frequency/incontinence since starting Tamsulosin.      Allergic rhinitis: Due to significant polypharmacy, may benefit from change in Cetirizine to prn vs scheduled use, and continue with regular use of Fluticasone NS.  Pt agreeable.    COPD: Stable. Patient would benefit from an updated COPD Action Plan.    Seizures:  Continue to follow with neurology.        PLAN:                                                      1.  MD may consider taper and discontinuation in Gabapentin: reduce to 300mg bid for 1 week then 300mg once daily for 1 week, then d'c.    2.  MD may consider d'c of prn Diazepam.  Pt not using and is a high-risk med.    3.  MD may consider d'c of Docusate and prn Lactulose.    4.  MD may consider reduction in Estradiol dose to 0.75mg once daily and patient to monitor for side effects of reduction.    5.  MD may consider taper and d'c of Ropinirole.  Please reduce to 0.5mg nightly for 3 days, then 0.25mg nightly for 3 days, then d'c.    6.  MD may consider change to Tamsulosin 0.4mg every other day from daily, and pt to monitor for improvement in urinary  frequency/incontinence.    7.  MD may consider reduction in Sucralfate to 1gm bid, and monitor.    8.  MD may consider change from scheduled Cetirizine 10mg daily to prn, but continue with Fluticasone NS scheduled.      I spent 60 minutes with this patient today. A copy of the visit note was provided to the patient's primary care provider.    Will follow up in 1 month over the phone, sooner if necessary.    The patient was mailed a summary of these recommendations as an after visit summary.     Elizabeth Rose, Pharm.D.,[facility] [place]  Board Certified Geriatric Pharmacist  Medication Therapy Management Pharmacist  [phone]

## 2017-03-31 NOTE — PATIENT INSTRUCTIONS
Recommendations from today's MTM visit:                                                    MTM (medication therapy management) is a service provided by a clinical pharmacist designed to help you get the most of out of your medicines.   Today we reviewed what your medicines are for, how to know if they are working, that your medicines are safe and how to make your medicine regimen as easy as possible.     1.  MD may consider taper and discontinuation in Gabapentin: reduce to 300mg twice daily for 1 week then 300mg once daily for 1 week, then stop.    2.  MD may consider stopping as needed Diazepam.  This is a high-risk medication.    3.  MD may consider stopping Docusate and as needed Lactulose.    4.  MD may consider reduction in Estradiol dose to 0.75mg once daily and patient to monitor for side effects of reduction.    5.  MD may consider taper and discontinuation of Ropinirole.  Please reduce to 0.5mg nightly for 3 days, then 0.25mg nightly for 3 days, then stop.  This medication can increase your risk of water retention, heart failure type symptoms, urinary tract infections, nausea, insomnia.    6.  MD may consider change to Tamsulosin 0.4mg every other day from daily, and  monitor for improvement in urinary frequency/incontinence.    7.  MD may consider reduction in Sucralfate to 1gm twice daily, and monitor.    8.  MD may consider change from scheduled Cetirizine (Zyrtec) 10mg daily to as needed, but continue with Fluticasone NS scheduled.        Next MTM visit: one month over the phone, sooner if necessary    To schedule another MTM appointment, please call the clinic directly or you may call the MTM scheduling line at [phone] or toll-free at [phone].     My Clinical Pharmacist's contact information:                                                      It was a pleasure seeing you today!  Please feel free to contact me with any questions or concerns you have.      Elizabeth Rose, Pharm.D.,[facility] [place]  Certified Geriatric Pharmacist  Medication Therapy Management Pharmacist  [phone]      You may receive a survey about the MTM services you received.  I would appreciate your feedback to help me serve you better in the future. Please fill it out and return it when you can. Your comments will be anonymous.

## 2017-04-03 ENCOUNTER — OFFICE VISIT (OUTPATIENT)
Dept: ORTHOPEDICS | Facility: CLINIC | Age: 67
End: 2017-04-03

## 2017-04-03 VITALS — BODY MASS INDEX: 20.83 KG/M2 | WEIGHT: 133 LBS | DIASTOLIC BLOOD PRESSURE: 70 MMHG | SYSTOLIC BLOOD PRESSURE: 138 MMHG

## 2017-04-03 DIAGNOSIS — M19.042 PRIMARY OSTEOARTHRITIS OF BOTH HANDS: Primary | ICD-10-CM

## 2017-04-03 DIAGNOSIS — M19.041 PRIMARY OSTEOARTHRITIS OF BOTH HANDS: Primary | ICD-10-CM

## 2017-04-03 NOTE — LETTER
4/3/2017      RE: Sonya Foote  [address]   [place] [id number]        Subjective:   Sonya Foote is a 67 year old female who follows up with bilateral hand pain. We have reviewed her hand MRI which demonstrates no infiltrates, effusions and her angiotensin converting enzyme level which was normal.  Her hand pain is all consistent with generalized osteoarthritis of the hands.     Date of injury: None  Date last seen: 3/30/2017  Following Therapeutic Plan: Yes, MRI   Pain: Unchanged  Function: Unchanged  Interval History:      PAST MEDICAL, SOCIAL, SURGICAL AND FAMILY HISTORY: She  has a past medical history of Anemia; Antiplatelet or antithrombotic long-term use; Arthritis; AS (sickle cell trait) (H) (10/8/2013); Blind left eye; BPPV (benign paroxysmal positional vertigo); Chronic back pain; COPD (chronic obstructive pulmonary disease) (H); Coronary artery disease; CVA (cerebral infarction) (10/8/2012); Diastolic CHF (H) (9/4/2012); Dilantin toxicity (5/31/2013); Esophageal candidiasis (H); GERD (gastroesophageal reflux disease); Heart attack (H); Hernia, abdominal; History of blood transfusion; History of thrombophlebitis; HTN (hypertension) (9/4/2012); Hyperlipidemia LDL goal <100 (9/4/2012); Legally blind in right eye, as defined in [place]; Osteoporosis (1/21/2013); Other chronic pain; PAD (peripheral artery disease) (H); Palpitations; Person who has had sex change operation (1/20/2014); Pneumonia; Schizoaffective disorder (H); Seizure disorder (H) (9/4/2012); Seizures (H); Sleep apnea; Thrombosis of leg; Type 2 diabetes, HbA1C goal < 8% (H) (9/4/2012); Uncomplicated asthma; and Unspecified cerebral artery occlusion with cerebral infarction. She also has no past medical history of Asymptomatic human immunodeficiency virus (HIV) infection status (H); Cerebral palsy (H); Congenital renal agenesis and dysgenesis; Difficult intubation; Goiter; Gout; History of spinal cord injury; Horseshoe kidney;  Hydrocephalus; Malignant hyperthermia; Mumps; Parkinsons disease (H); Spider veins; Spina bifida (H); STD (sexually transmitted disease); Tethered cord (H); or Tuberculosis.  She  has a past surgical history that includes appendectomy; tonsillectomy; Cholecystectomy; orthopedic surgery; Breast surgery; sinus surgery; Esophagoscopy, gastroscopy, duodenoscopy (EGD), combined (12/14/2012); vascular surgery; Esophagoscopy, gastroscopy, duodenoscopy (EGD), combined (12/31/2013); Sex transformation, male to female; Esophagoscopy, gastroscopy, duodenoscopy (EGD), combined (4/1/2014); Endarterectomy femoral (5/23/2014); Esophagoscopy, gastroscopy, duodenoscopy (EGD), combined (6/28/2014); Colonoscopy with CO2 insufflation (N/A, 8/20/2014); Esophagoscopy, gastroscopy, duodenoscopy (EGD), combined (N/A, 8/20/2014); Esophagoscopy, gastroscopy, duodenoscopy (EGD), combined (N/A, 8/22/2014); CAPSULE ENDOSCOPY (N/A, 8/25/2014); Colonoscopy (N/A, 8/25/2014); CAPSULE ENDOSCOPY (N/A, 10/2/2014); Esophagoscopy, gastroscopy, duodenoscopy (EGD), combined (N/A, 10/2/2014); Angiogram (Bilateral, 11/21/2014); Esophagoscopy, gastroscopy, duodenoscopy (EGD), combined (Left, 12/15/2014); Angiogram (Left, 1/16/2015); Esophagoscopy, gastroscopy, duodenoscopy (EGD), combined (N/A, 2/25/2015); Esophagoscopy, gastroscopy, duodenoscopy (EGD), combined (Left, 2/25/2015); Angiogram (Bilateral, 9/14/2015); Angiogram (Left, 10/12/2015); Amputate toe(s) (Right, 1/5/2016); Amputate leg above knee (Left, 6/11/2016); Fasciotomy lower extremity (Left, 6/10/2016); Angiogram (Right, 6/6/2016); Angioplasty (Right, 6/6/2016); Esophagoscopy, gastroscopy, duodenoscopy (EGD), combined (N/A, 9/25/2016); Amputate leg below knee (Right, 11/7/2016); Amputate revision stump lower extremity (Right, 11/11/2016); Amputate revision stump lower extremity (Right, 11/16/2016); and Esophagoscopy, gastroscopy, duodenoscopy (EGD), combined (N/A, 1/18/2017).  Her family history  includes Breast Cancer in her sister; CANCER in her maternal aunt and maternal aunt; CEREBROVASCULAR DISEASE in her brother; Coronary Artery Disease in her mother; DIABETES in her father, maternal grandfather, maternal grandmother, mother, paternal grandfather, paternal grandmother, and sister; Dementia in her mother; Depression in her brother and sister; Glaucoma in her father, maternal grandfather, maternal grandmother, mother, paternal grandfather, and paternal grandmother; Heart Failure in her father; Schizophrenia in her brother; Suicide in her sister.  She reports that she quit smoking about 16 years ago. Her smoking use included Cigarettes and Cigars. She has a 75.00 pack-year smoking history. She has never used smokeless tobacco. She reports that she does not drink alcohol or use illicit drugs.    ALLERGIES: She is allergic to bee venom; penicillins; dilantin [phenytoin]; iodide; iodine-131; and novocaine [procaine].    CURRENT MEDICATIONS: She has a current medication list which includes the following prescription(s): pantoprazole, pregabalin, risperidone, ferrous sulfate, lidocaine, blood glucose monitoring, order for dme, order for dme, sucralfate, order for dme, order for dme, albuterol, order for dme, ondansetron, metoprolol, cyanocobalamin, gabapentin, rosaura, polyethylene glycol, hydromorphone, fluticasone, advair diskus, calcium citrate-vitamin d, hydrochlorothiazide, lisinopril, escitalopram, estradiol, levetiracetam, trazodone, ropinirole, aspirin, clopidogrel, blood glucose monitoring, blood glucose monitoring, phenytoin sodium extended, docusate sodium, tamsulosin, dorzolamide, diazepam, spiriva handihaler, zinc, cetirizine, nitroglycerin, MEDICATION GIVEN BY INTRATHECAL PUMP - INSTRUCTION, latanoprost, atorvastatin, lactulose, order for dme, prochlorperazine, thin, order for dme, order for dme, order for dme, easy touch lancets 30g/twist, vitamin d, and multiple vitamins-minerals, and the  following Facility-Administered Medications: neostigmine and glycopyrrolate.     REVIEW OF SYSTEMS: 3 point review of systems is negative except as noted above.     Exam:   /70  Wt 133 lb (60.3 kg)  BMI 20.83 kg/m2      CONSTITUTIONIAL: alert and no distress  PSYCHIATRIC: affect normal/bright and mentation appears normal.    BILATERAL WRISTS: bilateral radial pulses 1+ and symmetric. Hands are warm and moist.        Assessment/Plan:   (M19.041,  M19.042) Primary osteoarthritis of both hands  (primary encounter diagnosis)    Sonya is a 67-year-old female with bilateral osteoarthritis of the hands.  She is on a significant number of medications, and because of this, I really did not want to make any specific adjustments.  Certainly the first consideration would be to avoid medication use.  I have advised her to use a heating pad which she already does and to try and keep her hands warm at all times.  Again, this is something she is already doing.      Topical therapy would principally be through Voltaren gel.  She certainly can start utilizing this, and I will leave this to Dr. Casey to decide if this would be appropriate.  The benefit of use of this gel is that she does not have to wash her hands if she were to come in contact with the mucous membrane which is a concern of hers.  For that reason, I would not be using capsaicin.      Medication therapy at this juncture is going to be with Tylenol, which I suspect would not be particularly appropriate given her other medications versus starting her on Cymbalta at 30-60 mg per day.  Because this can be centrally active and she is on many other medications, we have not discussed this.  Again, I would leave this to Dr. Casey to determine if that were appropriate.      All other questions were answered.      Thank you for allowing me to participate in her care.        Ky Ramírez MD

## 2017-04-03 NOTE — PROGRESS NOTES
Subjective:   Sonya Foote is a 67 year old female who follows up with bilateral hand pain. We have reviewed her hand MRI which demonstrates no infiltrates, effusions and her angiotensin converting enzyme level which was normal.  Her hand pain is all consistent with generalized osteoarthritis of the hands.     Date of injury: None  Date last seen: 3/30/2017  Following Therapeutic Plan: Yes, MRI   Pain: Unchanged  Function: Unchanged  Interval History:      PAST MEDICAL, SOCIAL, SURGICAL AND FAMILY HISTORY: She  has a past medical history of Anemia; Antiplatelet or antithrombotic long-term use; Arthritis; AS (sickle cell trait) (H) (10/8/2013); Blind left eye; BPPV (benign paroxysmal positional vertigo); Chronic back pain; COPD (chronic obstructive pulmonary disease) (H); Coronary artery disease; CVA (cerebral infarction) (10/8/2012); Diastolic CHF (H) (9/4/2012); Dilantin toxicity (5/31/2013); Esophageal candidiasis (H); GERD (gastroesophageal reflux disease); Heart attack (H); Hernia, abdominal; History of blood transfusion; History of thrombophlebitis; HTN (hypertension) (9/4/2012); Hyperlipidemia LDL goal <100 (9/4/2012); Legally blind in right eye, as defined in [place]; Osteoporosis (1/21/2013); Other chronic pain; PAD (peripheral artery disease) (H); Palpitations; Person who has had sex change operation (1/20/2014); Pneumonia; Schizoaffective disorder (H); Seizure disorder (H) (9/4/2012); Seizures (H); Sleep apnea; Thrombosis of leg; Type 2 diabetes, HbA1C goal < 8% (H) (9/4/2012); Uncomplicated asthma; and Unspecified cerebral artery occlusion with cerebral infarction. She also has no past medical history of Asymptomatic human immunodeficiency virus (HIV) infection status (H); Cerebral palsy (H); Congenital renal agenesis and dysgenesis; Difficult intubation; Goiter; Gout; History of spinal cord injury; Horseshoe kidney; Hydrocephalus; Malignant hyperthermia; Mumps; Parkinsons disease (H); Spider veins; Spina  bifida (H); STD (sexually transmitted disease); Tethered cord (H); or Tuberculosis.  She  has a past surgical history that includes appendectomy; tonsillectomy; Cholecystectomy; orthopedic surgery; Breast surgery; sinus surgery; Esophagoscopy, gastroscopy, duodenoscopy (EGD), combined (12/14/2012); vascular surgery; Esophagoscopy, gastroscopy, duodenoscopy (EGD), combined (12/31/2013); Sex transformation, male to female; Esophagoscopy, gastroscopy, duodenoscopy (EGD), combined (4/1/2014); Endarterectomy femoral (5/23/2014); Esophagoscopy, gastroscopy, duodenoscopy (EGD), combined (6/28/2014); Colonoscopy with CO2 insufflation (N/A, 8/20/2014); Esophagoscopy, gastroscopy, duodenoscopy (EGD), combined (N/A, 8/20/2014); Esophagoscopy, gastroscopy, duodenoscopy (EGD), combined (N/A, 8/22/2014); CAPSULE ENDOSCOPY (N/A, 8/25/2014); Colonoscopy (N/A, 8/25/2014); CAPSULE ENDOSCOPY (N/A, 10/2/2014); Esophagoscopy, gastroscopy, duodenoscopy (EGD), combined (N/A, 10/2/2014); Angiogram (Bilateral, 11/21/2014); Esophagoscopy, gastroscopy, duodenoscopy (EGD), combined (Left, 12/15/2014); Angiogram (Left, 1/16/2015); Esophagoscopy, gastroscopy, duodenoscopy (EGD), combined (N/A, 2/25/2015); Esophagoscopy, gastroscopy, duodenoscopy (EGD), combined (Left, 2/25/2015); Angiogram (Bilateral, 9/14/2015); Angiogram (Left, 10/12/2015); Amputate toe(s) (Right, 1/5/2016); Amputate leg above knee (Left, 6/11/2016); Fasciotomy lower extremity (Left, 6/10/2016); Angiogram (Right, 6/6/2016); Angioplasty (Right, 6/6/2016); Esophagoscopy, gastroscopy, duodenoscopy (EGD), combined (N/A, 9/25/2016); Amputate leg below knee (Right, 11/7/2016); Amputate revision stump lower extremity (Right, 11/11/2016); Amputate revision stump lower extremity (Right, 11/16/2016); and Esophagoscopy, gastroscopy, duodenoscopy (EGD), combined (N/A, 1/18/2017).  Her family history includes Breast Cancer in her sister; CANCER in her maternal aunt and maternal aunt;  CEREBROVASCULAR DISEASE in her brother; Coronary Artery Disease in her mother; DIABETES in her father, maternal grandfather, maternal grandmother, mother, paternal grandfather, paternal grandmother, and sister; Dementia in her mother; Depression in her brother and sister; Glaucoma in her father, maternal grandfather, maternal grandmother, mother, paternal grandfather, and paternal grandmother; Heart Failure in her father; Schizophrenia in her brother; Suicide in her sister.  She reports that she quit smoking about 16 years ago. Her smoking use included Cigarettes and Cigars. She has a 75.00 pack-year smoking history. She has never used smokeless tobacco. She reports that she does not drink alcohol or use illicit drugs.    ALLERGIES: She is allergic to bee venom; penicillins; dilantin [phenytoin]; iodide; iodine-131; and novocaine [procaine].    CURRENT MEDICATIONS: She has a current medication list which includes the following prescription(s): pantoprazole, pregabalin, risperidone, ferrous sulfate, lidocaine, blood glucose monitoring, order for dme, order for dme, sucralfate, order for dme, order for dme, albuterol, order for dme, ondansetron, metoprolol, cyanocobalamin, gabapentin, rosaura, polyethylene glycol, hydromorphone, fluticasone, advair diskus, calcium citrate-vitamin d, hydrochlorothiazide, lisinopril, escitalopram, estradiol, levetiracetam, trazodone, ropinirole, aspirin, clopidogrel, blood glucose monitoring, blood glucose monitoring, phenytoin sodium extended, docusate sodium, tamsulosin, dorzolamide, diazepam, spiriva handihaler, zinc, cetirizine, nitroglycerin, MEDICATION GIVEN BY INTRATHECAL PUMP - INSTRUCTION, latanoprost, atorvastatin, lactulose, order for dme, prochlorperazine, thin, order for dme, order for dme, order for dme, easy touch lancets 30g/twist, vitamin d, and multiple vitamins-minerals, and the following Facility-Administered Medications: neostigmine and glycopyrrolate.     REVIEW OF  SYSTEMS: 3 point review of systems is negative except as noted above.     Exam:   /70  Wt 133 lb (60.3 kg)  BMI 20.83 kg/m2      CONSTITUTIONIAL: alert and no distress  PSYCHIATRIC: affect normal/bright and mentation appears normal.    BILATERAL WRISTS: bilateral radial pulses 1+ and symmetric. Hands are warm and moist.        Assessment/Plan:   (M19.041,  M19.042) Primary osteoarthritis of both hands  (primary encounter diagnosis)    Sonya is a 67-year-old female with bilateral osteoarthritis of the hands.  She is on a significant number of medications, and because of this, I really did not want to make any specific adjustments.  Certainly the first consideration would be to avoid medication use.  I have advised her to use a heating pad which she already does and to try and keep her hands warm at all times.  Again, this is something she is already doing.      Topical therapy would principally be through Voltaren gel.  She certainly can start utilizing this, and I will leave this to Dr. Casey to decide if this would be appropriate.  The benefit of use of this gel is that she does not have to wash her hands if she were to come in contact with the mucous membrane which is a concern of hers.  For that reason, I would not be using capsaicin.      Medication therapy at this juncture is going to be with Tylenol, which I suspect would not be particularly appropriate given her other medications versus starting her on Cymbalta at 30-60 mg per day.  Because this can be centrally active and she is on many other medications, we have not discussed this.  Again, I would leave this to Dr. Casey to determine if that were appropriate.      All other questions were answered.      Thank you for allowing me to participate in her care.

## 2017-04-04 ENCOUNTER — OFFICE VISIT (OUTPATIENT)
Dept: CARDIOLOGY | Facility: CLINIC | Age: 67
End: 2017-04-04
Attending: INTERNAL MEDICINE
Payer: COMMERCIAL

## 2017-04-04 VITALS — SYSTOLIC BLOOD PRESSURE: 155 MMHG | HEART RATE: 68 BPM | DIASTOLIC BLOOD PRESSURE: 75 MMHG

## 2017-04-04 DIAGNOSIS — I10 ESSENTIAL HYPERTENSION WITH GOAL BLOOD PRESSURE LESS THAN 130/80: ICD-10-CM

## 2017-04-04 DIAGNOSIS — I65.22 CAROTID STENOSIS, LEFT: ICD-10-CM

## 2017-04-04 DIAGNOSIS — E78.5 HYPERLIPIDEMIA LDL GOAL <70: ICD-10-CM

## 2017-04-04 DIAGNOSIS — I25.2 OLD MYOCARDIAL INFARCTION: ICD-10-CM

## 2017-04-04 DIAGNOSIS — I73.9 PAD (PERIPHERAL ARTERY DISEASE) (H): ICD-10-CM

## 2017-04-04 DIAGNOSIS — I50.22 CHRONIC SYSTOLIC CONGESTIVE HEART FAILURE (H): ICD-10-CM

## 2017-04-04 DIAGNOSIS — I25.118 CORONARY ARTERY DISEASE OF NATIVE ARTERY OF NATIVE HEART WITH STABLE ANGINA PECTORIS (H): Primary | ICD-10-CM

## 2017-04-04 DIAGNOSIS — I25.5 ISCHEMIC CARDIOMYOPATHY: ICD-10-CM

## 2017-04-04 PROCEDURE — 99214 OFFICE O/P EST MOD 30 MIN: CPT | Performed by: INTERNAL MEDICINE

## 2017-04-04 RX ORDER — LISINOPRIL 10 MG/1
10 TABLET ORAL DAILY
Qty: 90 TABLET | Refills: 3 | Status: SHIPPED | OUTPATIENT
Start: 2017-04-04 | End: 2018-03-31

## 2017-04-04 NOTE — PROGRESS NOTES
HPI and Plan:   See dictation:[id number]    Orders Placed This Encounter   Procedures     Basic metabolic panel     Follow-Up with Cardiologist       Orders Placed This Encounter   Medications     lisinopril (PRINIVIL/ZESTRIL) 10 MG tablet     Sig: Take 1 tablet (10 mg) by mouth daily     Dispense:  90 tablet     Refill:  3       Medications Discontinued During This Encounter   Medication Reason     lisinopril (PRINIVIL,ZESTRIL) 2.5 MG tablet Reorder         Encounter Diagnoses   Name Primary?     Coronary artery disease of native artery of native heart with stable angina pectoris (H) Yes     Old myocardial infarction      Ischemic cardiomyopathy      Chronic systolic congestive heart failure (H)      Carotid stenosis, left      Essential hypertension with goal blood pressure less than 130/80      Hyperlipidemia LDL goal <70      PAD (peripheral artery disease) (H)        CURRENT MEDICATIONS:  Current Outpatient Prescriptions   Medication Sig Dispense Refill     lisinopril (PRINIVIL/ZESTRIL) 10 MG tablet Take 1 tablet (10 mg) by mouth daily 90 tablet 3     pantoprazole (PROTONIX) 40 MG EC tablet Take 40 mg by mouth daily       pregabalin (LYRICA) 100 MG capsule Take 100 mg by mouth 2 times daily       risperiDONE (RISPERDAL) 0.5 MG tablet Take 0.5 mg by mouth At Bedtime       ferrous sulfate (IRON) 325 (65 FE) MG tablet Take 1 tablet (325 mg) by mouth 2 times daily With meals 60 tablet 2     sucralfate (CARAFATE) 1 GM tablet Take 1 tablet (1 g) by mouth 4 times daily May dissolve in 10 mL water is necessary. (Start upon completion of carafate suspension) 40 tablet 5     albuterol (2.5 MG/3ML) 0.083% neb solution INHALE 1 VIAL VIA NEBULIZER EVERY 6 HOURS AS NEEDED 360 mL 11     ondansetron (ZOFRAN-ODT) 4 MG ODT tab Take 1 tablet (4 mg) by mouth every 6 hours (Patient taking differently: Take 4 mg by mouth every 6 hours as needed ) 120 tablet 0     metoprolol (TOPROL-XL) 25 MG 24 hr tablet Take 1 tablet (25 mg) by mouth  daily 30 tablet 0     CYANOCOBALAMIN PO Take 2,000 mcg by mouth daily       gabapentin (NEURONTIN) 300 MG capsule Take 1 capsule (300 mg) by mouth 3 times daily 90 capsule 3     Nutritional Supplements (RENÉE) PACK Take 1 packet by mouth 2 times daily       polyethylene glycol (MIRALAX/GLYCOLAX) powder Take 17 g by mouth daily       HYDROmorphone (DILAUDID) 2 MG tablet Take 1 tablet (2 mg) by mouth every 3 hours as needed for moderate to severe pain 30 tablet 0     fluticasone (FLONASE) 50 MCG/ACT nasal spray Spray 1 spray into both nostrils daily       ADVAIR DISKUS 250-50 MCG/DOSE diskus inhaler Inhale 1 puff into the lungs 2 times daily        calcium citrate-vitamin D (CITRACAL) 315-250 MG-UNIT TABS Take 2 tablets by mouth daily 120 tablet 5     hydrochlorothiazide (MICROZIDE) 12.5 MG capsule Take 1 capsule (12.5 mg) by mouth daily 90 capsule 3     escitalopram (LEXAPRO) 20 MG tablet One per day 90 tablet 3     estradiol (ESTRACE) 1 MG tablet Take 1 tablet (1 mg) by mouth daily 90 tablet 3     levETIRAcetam (KEPPRA) 500 MG tablet Take 1 tablet (500 mg) by mouth 2 times daily 180 tablet 1     traZODone (DESYREL) 100 MG tablet Take 1 tablet (100 mg) by mouth At Bedtime 90 tablet 3     rOPINIRole (REQUIP) 0.25 MG tablet Take 3 tablets (0.75 mg) by mouth At Bedtime 270 tablet 1     aspirin EC 81 MG EC tablet Take 1 tablet (81 mg) by mouth daily 90 tablet 3     clopidogrel (PLAVIX) 75 MG tablet Take 1 tablet (75 mg) by mouth daily (Patient taking differently: Take 75 mg by mouth At Bedtime ) 90 tablet 3     phenytoin 200 MG CAPS Take 200 mg by mouth 2 times daily (Patient taking differently: Take 200 mg by mouth 2 times daily (takes at 8 AM and 8 PM)) 60 capsule 0     DOCUSATE SODIUM PO Take 100 mg by mouth daily       tamsulosin (FLOMAX) 0.4 MG 24 hr capsule Take 0.4 mg by mouth At Bedtime       dorzolamide (TRUSOPT) 2 % ophthalmic solution Place 1 drop into both eyes 2 times daily        diazepam (VALIUM) 5 MG  tablet Take 1 tablet (5 mg) by mouth every 6 hours as needed for anxiety 20 tablet 1     zinc 50 MG TABS Take 1 tablet by mouth daily       cetirizine (ZYRTEC) 10 MG tablet Take 1 tablet (10 mg) by mouth every evening 90 tablet 3     nitroglycerin (NITROSTAT) 0.4 MG SL tablet Place 1 tablet (0.4 mg) under the tongue every 5 minutes as needed for chest pain if you are still having symptoms after 3 doses (15 minutes) call 911. 25 tablet 1     atorvastatin (LIPITOR) 40 MG tablet Take 1 tablet (40 mg) by mouth daily (Patient taking differently: Take 40 mg by mouth At Bedtime ) 90 tablet 3     prochlorperazine (COMPAZINE) 10 MG tablet Take 1 tablet (10 mg) by mouth every 8 hours as needed 20 tablet 0     Cholecalciferol (VITAMIN D) 2000 UNITS tablet Take 2,000 Units by mouth daily. 100 tablet 3     Multiple Vitamin (MULTIVITAMIN OR) Take 1 tablet by mouth daily        lidocaine (LIDODERM) 5 % Patch Apply from 1 to 3 patches to painful area at once for up to 12 h within a 24 h period.  Remove after 12 hours. 30 patch 1     blood glucose monitoring (ONE TOUCH ULTRA) test strip Use to test blood sugars 3 times daily or as directed. 3 Box 3     order for DME Equipment being ordered: wheelchair seat cushion 1 Device 0     order for DME Equipment being ordered: CPAP supplies mask, hose, filters, cushion    fax to [facility] at [phone] 10 Device prn     order for DME Equipment being ordered: CPAP supplies mask, hose, filters, cushion fax to [facility] at [phone]  Disp: 10 Device Refills: prn   Class: Local Print Start: 2/10/2017 1 Device 0     order for DME Equipment being ordered: Nebulizer and tubing supplies 1 Units 0     order for DME ACcu check BID 1 Device 0     [DISCONTINUED] lisinopril (PRINIVIL,ZESTRIL) 2.5 MG tablet Take 1 tablet (2.5 mg) by mouth daily 90 tablet 3     blood glucose monitoring (ONE TOUCH ULTRA 2) meter device kit Use to test blood sugars 3 times daily or as directed. 1 kit 0      blood glucose monitoring (ONE TOUCH ULTRASOFT) lancets Use to test blood sugar 3 times daily or as directed. 3 Box 3     SPIRIVA HANDIHALER 18 MCG inhalation capsule INHALE THE CONTENTS OF 1 CAPSULE VIA INHALATION DEVICE DAILY 30 capsule 2     MEDICATION GIVEN BY INTRATHECAL PUMP - INSTRUCTION continuous 4/11/2016 per [facility] in [place] [phone]:  Conc: Bupivacaine 20 mg/mL and Fentanyl 2000 mcg/mL.  Continuous: Bupivacaine 5.052 mg/day and Fentanyl 505.2 mcg/day.  Boluses: Up to 7 boluses per 24-hr period of Bupivicaine 0.5992 mg and Fentanyl 59.9 mcg  Max daily dose: Bupivacaine 9.1973 mg/day and Fentanyl 919.5883 mcg/day    Pump Last Fill Date:  6/30/2016  Pump Refill Date: 9/20/2016       latanoprost (XALATAN) 0.005 % ophthalmic solution Place 1 drop into both eyes At Bedtime 2.5 mL 11     lactulose (CHRONULAC) 10 GM/15ML solution Take 30 mLs (20 g) by mouth daily as needed for constipation 946 mL 11     order for DME Equipment being ordered: Glucerna daily shakes with each meal 1 Box 11     thin (NO BRAND SPECIFIED) lancets Use to test blood sugar 3 times daily or as directed. 1 Box 10     ORDER FOR DME Equipment being ordered: Nebulizer and supplies 1 Units 0     ORDER FOR DME Equipment being ordered: Power Wheelchair 1 Device 0     ORDER FOR DME Equipment being ordered: Depends briefs 1 Month 11     EASY TOUCH LANCETS 30G/TWIST MISC          ALLERGIES     Allergies   Allergen Reactions     Bee Venom      Penicillins Anaphylaxis     Other reaction(s): Skin Rash and/or Hives     Dilantin [Phenytoin] Other (See Comments)     Generic dilantin only per pt     Iodide Hives     09/11/15: Pt states she can use iodine and doesn't have any problems      Iodine-131      Novocaine [Procaine] Hives     Other reaction(s): Skin Rash and/or Hives       PAST MEDICAL HISTORY:  Past Medical History:   Diagnosis Date     Anemia      Antiplatelet or antithrombotic long-term use      Arthritis       AS (sickle cell trait) (H) 10/8/2013     Blind left eye      BPPV (benign paroxysmal positional vertigo)      Chronic back pain      COPD (chronic obstructive pulmonary disease) (H)      Coronary artery disease      CVA (cerebral infarction) 10/8/2012    x3, residual left sided weakness     Diastolic CHF (H) 9/4/2012     Dilantin toxicity 5/31/2013     Esophageal candidiasis (H)      GERD (gastroesophageal reflux disease)      Heart attack (H)      Hernia, abdominal      History of blood transfusion     x5     History of thrombophlebitis      HTN (hypertension) 9/4/2012     Hyperlipidemia LDL goal <100 9/4/2012     Legally blind in right eye, as defined in [place]     No periphal vision right eye     Osteoporosis 1/21/2013     Other chronic pain      PAD (peripheral artery disease) (H)      Palpitations      Person who has had sex change operation 1/20/2014     Pneumonia      Schizoaffective disorder (H)      Seizure disorder (H) 9/4/2012     Seizures (H)      Sleep apnea     CPAP     Thrombosis of leg      Type 2 diabetes, HbA1C goal < 8% (H) 9/4/2012     Uncomplicated asthma      Unspecified cerebral artery occlusion with cerebral infarction        PAST SURGICAL HISTORY:  Past Surgical History:   Procedure Laterality Date     AMPUTATE LEG ABOVE KNEE Left 6/11/2016    Procedure: AMPUTATE LEG ABOVE KNEE;  Surgeon: Mello Rodriguez MD;  [facility]     AMPUTATE LEG BELOW KNEE Right 11/7/2016    Procedure: AMPUTATE LEG BELOW KNEE;  Surgeon: Savannah Durant MD;  [facility]     AMPUTATE REVISION STUMP LOWER EXTREMITY Right 11/11/2016    Procedure: AMPUTATE REVISION STUMP LOWER EXTREMITY;  Surgeon: Savannah Durant MD;  [facility]     AMPUTATE REVISION STUMP LOWER EXTREMITY Right 11/16/2016    Procedure: AMPUTATE REVISION STUMP LOWER EXTREMITY;  Surgeon: Savannah Durant MD;  [facility]     AMPUTATE TOE(S) Right 1/5/2016    Procedure: AMPUTATE TOE(S);  Surgeon: Mello Gaines DPM;  Location: [facility]      ANGIOGRAM Bilateral 11/21/2014    Procedure: ANGIOGRAM;  Surgeon: Savannah Durant MD;  [facility]     ANGIOGRAM Left 1/16/2015    Procedure: ANGIOGRAM;  Surgeon: Savannah Durant MD;  [facility]     ANGIOGRAM Bilateral 9/14/2015    Procedure: ANGIOGRAM;  Surgeon: Savannah Durant MD;  [facility]     ANGIOGRAM Left 10/12/2015    Procedure: ANGIOGRAM;  Surgeon: Savannah Durant MD;  [facility]     ANGIOGRAM Right 6/6/2016    Procedure: ANGIOGRAM;  Surgeon: Savannah Durant MD;  [facility]     ANGIOPLASTY Right 6/6/2016    Procedure: ANGIOPLASTY;  Surgeon: Savannah Durant MD;  [facility]     APPENDECTOMY       BREAST SURGERY      right breast bx (benign)     CHOLECYSTECTOMY       COLONOSCOPY N/A 8/25/2014    Procedure: COLONOSCOPY;  Surgeon: Mello Ferrer MD;  [facility]     COLONOSCOPY WITH CO2 INSUFFLATION N/A 8/20/2014    Procedure: COLONOSCOPY WITH CO2 INSUFFLATION;  Surgeon: Duane, William Charles, MD;  [facility]     ENDARTERECTOMY FEMORAL  5/23/2014    Procedure: ENDARTERECTOMY FEMORAL;  Surgeon: Jason Joshi MD;  [facility]     ESOPHAGOSCOPY, GASTROSCOPY, DUODENOSCOPY (EGD), COMBINED  12/14/2012    Procedure: COMBINED ESOPHAGOSCOPY, GASTROSCOPY, DUODENOSCOPY (EGD), BIOPSY SINGLE OR MULTIPLE;  ESOPHAGOSCOPY, GASTROSCOPY, DUODENOSCOPY (EGD), DILATATION ;  Surgeon: Elizabeth Stevenson MD;  Location:  GI     ESOPHAGOSCOPY, GASTROSCOPY, DUODENOSCOPY (EGD), COMBINED  12/31/2013    Procedure: COMBINED ESOPHAGOSCOPY, GASTROSCOPY, DUODENOSCOPY (EGD), BIOPSY SINGLE OR MULTIPLE;;  Surgeon: Clemente Lopez MD;  [facility]     ESOPHAGOSCOPY, GASTROSCOPY, DUODENOSCOPY (EGD), COMBINED  4/1/2014    Procedure: COMBINED ESOPHAGOSCOPY, GASTROSCOPY, DUODENOSCOPY (EGD);;  Surgeon: Clemente Lopez MD;  [facility]     ESOPHAGOSCOPY, GASTROSCOPY, DUODENOSCOPY (EGD), COMBINED  6/28/2014    Procedure: COMBINED ESOPHAGOSCOPY, GASTROSCOPY, DUODENOSCOPY (EGD);  Surgeon: Clemente Lopez,  MD;  [facility]     ESOPHAGOSCOPY, GASTROSCOPY, DUODENOSCOPY (EGD), COMBINED N/A 8/20/2014    Procedure: COMBINED ESOPHAGOSCOPY, GASTROSCOPY, DUODENOSCOPY (EGD), BIOPSY SINGLE OR MULTIPLE;  Surgeon: Duane, William Charles, MD;  [facility]     ESOPHAGOSCOPY, GASTROSCOPY, DUODENOSCOPY (EGD), COMBINED N/A 8/22/2014    Procedure: COMBINED ESOPHAGOSCOPY, GASTROSCOPY, DUODENOSCOPY (EGD), BIOPSY SINGLE OR MULTIPLE;  Surgeon: Mello Ferrer MD;  [facility]     ESOPHAGOSCOPY, GASTROSCOPY, DUODENOSCOPY (EGD), COMBINED N/A 10/2/2014    Procedure: COMBINED ESOPHAGOSCOPY, GASTROSCOPY, DUODENOSCOPY (EGD), BIOPSY SINGLE OR MULTIPLE;  Surgeon: Remy Haskins MD;  [facility]     ESOPHAGOSCOPY, GASTROSCOPY, DUODENOSCOPY (EGD), COMBINED Left 12/15/2014    Procedure: COMBINED ESOPHAGOSCOPY, GASTROSCOPY, DUODENOSCOPY (EGD), BIOPSY SINGLE OR MULTIPLE;  Surgeon: Remy Haskins MD;  [facility]     ESOPHAGOSCOPY, GASTROSCOPY, DUODENOSCOPY (EGD), COMBINED N/A 2/25/2015    Procedure: COMBINED ENDOSCOPIC ULTRASOUND, ESOPHAGOSCOPY, GASTROSCOPY, DUODENOSCOPY (EGD), FINE NEEDLE ASPIRATE/BIOPSY;  Surgeon: Clemente Lugo MD;  [facility]     ESOPHAGOSCOPY, GASTROSCOPY, DUODENOSCOPY (EGD), COMBINED Left 2/25/2015    Procedure: COMBINED ESOPHAGOSCOPY, GASTROSCOPY, DUODENOSCOPY (EGD), BIOPSY SINGLE OR MULTIPLE;  Surgeon: Clemente Lugo MD;  [facility]     ESOPHAGOSCOPY, GASTROSCOPY, DUODENOSCOPY (EGD), COMBINED N/A 9/25/2016    Procedure: COMBINED ESOPHAGOSCOPY, GASTROSCOPY, DUODENOSCOPY (EGD);  Surgeon: Aziza Patiño MD;  [facility]     ESOPHAGOSCOPY, GASTROSCOPY, DUODENOSCOPY (EGD), COMBINED N/A 1/18/2017    Procedure: COMBINED ESOPHAGOSCOPY, GASTROSCOPY, DUODENOSCOPY (EGD), BIOPSY SINGLE OR MULTIPLE;  Surgeon: Clemente Lopez MD;  Location:  [facility]     FASCIOTOMY LOWER EXTREMITY Left 6/10/2016    Procedure: FASCIOTOMY LOWER EXTREMITY;  Surgeon: Mello Rodriguez MD;  [facility]     HC CAPSULE ENDOSCOPY N/A 8/25/2014    Procedure: CAPSULE/PILL CAM ENDOSCOPY;  Surgeon: Remy Haskins MD;  Location:  [facility]     HC CAPSULE ENDOSCOPY N/A 10/2/2014    Procedure: CAPSULE/PILL CAM ENDOSCOPY;  Surgeon: Remy Haskins MD;  Location:  GI     ORTHOPEDIC SURGERY      broken wrist repair     SEX TRANSFORMATION SURGERY, MALE TO FEMALE      1974     SINUS SURGERY      cyst removed     TONSILLECTOMY       VASCULAR SURGERY      Left carotid stent       FAMILY HISTORY:  Family History   Problem Relation Age of Onset     Dementia Mother      Glaucoma Mother      DIABETES Mother      Coronary Artery Disease Mother      MI     Glaucoma Father      DIABETES Father      Heart Failure Father      CANCER Maternal Aunt      leukemia     Schizophrenia Brother      Depression Brother      Suicide Sister      Depression Sister      DIABETES Sister      CANCER Maternal Aunt      ovarian     Glaucoma Maternal Grandmother      DIABETES Maternal Grandmother      Glaucoma Maternal Grandfather      DIABETES Maternal Grandfather      Glaucoma Paternal Grandmother      DIABETES Paternal Grandmother      Glaucoma Paternal Grandfather      DIABETES Paternal Grandfather      Breast Cancer Sister      CEREBROVASCULAR DISEASE Brother        SOCIAL HISTORY:  Social History     Social History     Marital status:      Spouse name: Jayde     Number of children: 2     Years of education: N/A     Occupational History     mental health worker      retired     Social History Main Topics     Smoking status: Former Smoker     Packs/day: 2.50     Years: 30.00     Types: Cigarettes, Cigars     Quit date: 11/1/2000     Smokeless tobacco: Never Used     Alcohol use No     Drug use: No     Sexual activity: Yes     Other Topics Concern     Caffeine Concern No     1 in the morning     Social History Narrative    The patient is a former smoker.  She smoked 2.5 packs per year for approximately 30 years.  She quit in 2000  "using Wellbutrin and patches.  Her father was in the  and she moved around a lot when she was a kid, and has lived abroad including in [place] and the [place].  She was previously employed as a mental health worker. Moved from [place] to [place] in 2012. She is currently living in a senior living apartment, and describes her relationship status as a \" demarco couple.\"  She manages her own medications.  She has no recent travel and no known TB exposures.         Review of Systems:  Skin:  Negative       Eyes:  Positive for glasses    ENT:  Negative      Respiratory:  Positive for dyspnea on exertion;cough     Cardiovascular:    Positive for;fatigue;lightheadedness;dizziness    Gastroenterology: Positive for heartburn;reflux    Genitourinary:  not assessed      Musculoskeletal:  Positive for back pain;joint pain    Neurologic:  Positive for headaches    Psychiatric:  Negative      Heme/Lymph/Imm:  Positive for allergies    Endocrine:  Positive for diabetes      Physical Exam:  Vitals: /75 (BP Location: Right arm, Cuff Size: Adult Large)  Pulse 68    Constitutional:  cooperative, alert and oriented, well developed, well nourished, in no acute distress   Examined in a wheelchair    Skin:  warm and dry to the touch, no apparent skin lesions or masses noted        Head:  normocephalic, no masses or lesions        Eyes:  pupils equal and round, conjunctivae and lids unremarkable, sclera white, no xanthalasma, EOMS intact, no nystagmus        ENT:  no pallor or cyanosis        Neck:  carotid pulses are full and equal bilaterally;JVP normal bilateral carotid bruit      Chest:  normal breath sounds, clear to auscultation, normal A-P diameter, normal symmetry, normal respiratory excursion, no use of accessory muscles          Cardiac: regular rhythm;normal S1 and S2;apical impulse not displaced   S4 no presence of murmur            Abdomen:  abdomen soft, non-tender, BS normoactive, no mass, no HSM, no " bruits        Vascular:                                          Extremities and Back:  normal muscle strength and tone;no spinal abnormalities noted         bilateral AKA    Neurological:  affect appropriate, oriented to time, person and place   examined in a wheelchair          CC  Bj Anderson MD   [facility]  [id number]  [place] [id number]

## 2017-04-04 NOTE — MR AVS SNAPSHOT
After Visit Summary   4/4/2017    Sonya Foote    MRN: [id number]           Patient Information     Date Of Birth          1950        Visit Information        Provider Department      4/4/2017 2:45 PM Bj Anderson MD [facility] [place]        Today's Diagnoses     Coronary artery disease of native artery of native heart with stable angina pectoris (H)    -  1    Old myocardial infarction        Ischemic cardiomyopathy        Chronic systolic congestive heart failure (H)        Carotid stenosis, left        Essential hypertension with goal blood pressure less than 130/80        Hyperlipidemia LDL goal <70        PAD (peripheral artery disease) (H)           Follow-ups after your visit        Additional Services     Follow-Up with Cardiologist                 Your next 10 appointments already scheduled     Apr 26, 2017  8:40 AM CDT   (Arrive by 8:25 AM)   NEW HEART FAILURE with Radha Aldrich MD   [facility] ([facility])    [address] [place] [address]   [phone]            May 08, 2017  1:00 PM CDT   Nurse Visit with UA NURSE   [facility] ([facility])    [address]  [address]  [facility] [address]   [phone]            May 12, 2017 10:50 AM CDT   (Arrive by 10:35 AM)   RETURN DIABETES with Michelle Irizarry MD   [facility] ([facility])    [address] [place] [address]   [phone]            May 18, 2017  1:00 PM CDT   (Arrive by 12:45 PM)   Cystoscopy with Elizabeth Oliver MD   [facility] ([facility])    [address] [place] [address]  [address]th [facility] [address]   [phone]            Jul 10, 2017 11:00 AM CDT   (Arrive by 10:45 AM)   Return Visit with Alina He  "MD Michaela   [facility] ([facility])    [address] [address] [phone]              Future tests that were ordered for you today     Open Future Orders        Priority Expected Expires Ordered    Follow-Up with Cardiologist Routine 10/1/2017 2018 2017    Basic metabolic panel Routine 2017            Who to contact     If you have questions or need follow up information about today's clinic visit or your schedule please contact [facility] [place] directly at [phone].  Normal or non-critical lab and imaging results will be communicated to you by MyChart, letter or phone within 4 business days after the clinic has received the results. If you do not hear from us within 7 days, please contact the clinic through MyChart or phone. If you have a critical or abnormal lab result, we will notify you by phone as soon as possible.  Submit refill requests through [employer] or call your pharmacy and they will forward the refill request to us. Please allow 3 business days for your refill to be completed.          Additional Information About Your Visit        MyChart Information     [employer] lets you send messages to your doctor, view your test results, renew your prescriptions, schedule appointments and more. To sign up, go to [email or web address] . Click on \"Log in\" on the left side of the screen, which will take you to the Welcome page. Then click on \"Sign up Now\" on the right side of the page.     You will be asked to enter the access code listed below, as well as some personal information. Please follow the directions to create your username and password.     Your access code is: [email or web address]  Expires: 2017 11:52 AM     Your access code will  in 90 days. If you need help or a new code, please call your [place] clinic or [phone].      "   Care EveryWhere ID     This is your Care EveryWhere ID. This could be used by other organizations to access your [place] medical records  [id number]        Your Vitals Were     Pulse                   68            Blood Pressure from Last 3 Encounters:   04/04/17 155/75   04/03/17 138/70   03/23/17 138/70    Weight from Last 3 Encounters:   04/03/17 60.3 kg (133 lb)   03/22/17 60.3 kg (133 lb)   03/15/17 61.2 kg (135 lb)              We Performed the Following     Follow-Up with Cardiologist          Today's Medication Changes          These changes are accurate as of: 4/4/17  3:26 PM.  If you have any questions, ask your nurse or doctor.               These medicines have changed or have updated prescriptions.        Dose/Directions    atorvastatin 40 MG tablet   Commonly known as:  LIPITOR   This may have changed:  when to take this   Used for:  Hyperlipidemia LDL goal <100        Dose:  40 mg   Take 1 tablet (40 mg) by mouth daily   Quantity:  90 tablet   Refills:  3       clopidogrel 75 MG tablet   Commonly known as:  PLAVIX   This may have changed:  when to take this   Used for:  PAD (peripheral artery disease) (H), UGI bleed, Osteoporosis, Nausea        Dose:  75 mg   Take 1 tablet (75 mg) by mouth daily   Quantity:  90 tablet   Refills:  3       lisinopril 10 MG tablet   Commonly known as:  PRINIVIL/ZESTRIL   This may have changed:    - medication strength  - how much to take   Used for:  Essential hypertension with goal blood pressure less than 130/80   Changed by:  jB Anderson MD        Dose:  10 mg   Take 1 tablet (10 mg) by mouth daily   Quantity:  90 tablet   Refills:  3       ondansetron 4 MG ODT tab   Commonly known as:  ZOFRAN-ODT   This may have changed:    - when to take this  - reasons to take this   Used for:  Intractable vomiting with nausea, unspecified vomiting type        Dose:  4 mg   Take 1 tablet (4 mg) by mouth every 6 hours   Quantity:  120 tablet   Refills:  0        Phenytoin Sodium Extended 200 MG Caps   This may have changed:  additional instructions   Used for:  Seizure disorder (H)        Dose:  200 mg   Take 200 mg by mouth 2 times daily   Quantity:  60 capsule   Refills:  0            Where to get your medicines      Some of these will need a paper prescription and others can be bought over the counter.  Ask your nurse if you have questions.     Bring a paper prescription for each of these medications     lisinopril 10 MG tablet                Primary Care Provider Office Phone # Fax #    Allan Casey [phone]       [facility] [address] [facility] [address]        Thank you!     Thank you for choosing [facility] [place]  for your care. Our goal is always to provide you with excellent care. Hearing back from our patients is one way we can continue to improve our services. Please take a few minutes to complete the written survey that you may receive in the mail after your visit with us. Thank you!             Your Updated Medication List - Protect others around you: Learn how to safely use, store and throw away your medicines at www.disposemymeds.org.          This list is accurate as of: 4/4/17  3:26 PM.  Always use your most recent med list.                   Brand Name Dispense Instructions for use    ADVAIR DISKUS 250-50 MCG/DOSE diskus inhaler   Generic drug:  fluticasone-salmeterol      Inhale 1 puff into the lungs 2 times daily       albuterol (2.5 MG/3ML) 0.083% neb solution     360 mL    INHALE 1 VIAL VIA NEBULIZER EVERY 6 HOURS AS NEEDED       aspirin 81 MG EC tablet     90 tablet    Take 1 tablet (81 mg) by mouth daily       atorvastatin 40 MG tablet    LIPITOR    90 tablet    Take 1 tablet (40 mg) by mouth daily       blood glucose monitoring meter device kit     1 kit    Use to test blood sugars 3 times daily or as directed.       blood glucose monitoring test strip    ONE TOUCH  ULTRA    3 Box    Use to test blood sugars 3 times daily or as directed.       calcium citrate-vitamin D 315-250 MG-UNIT Tabs per tablet    CITRACAL    120 tablet    Take 2 tablets by mouth daily       cetirizine 10 MG tablet    zyrTEC    90 tablet    Take 1 tablet (10 mg) by mouth every evening       clopidogrel 75 MG tablet    PLAVIX    90 tablet    Take 1 tablet (75 mg) by mouth daily       CYANOCOBALAMIN PO      Take 2,000 mcg by mouth daily       diazepam 5 MG tablet    VALIUM    20 tablet    Take 1 tablet (5 mg) by mouth every 6 hours as needed for anxiety       DOCUSATE SODIUM PO      Take 100 mg by mouth daily       dorzolamide 2 % ophthalmic solution    TRUSOPT     Place 1 drop into both eyes 2 times daily       * EASY TOUCH LANCETS 30G/TWIST Misc          * thin lancets    NO BRAND SPECIFIED    1 Box    Use to test blood sugar 3 times daily or as directed.       * blood glucose monitoring lancets     3 Box    Use to test blood sugar 3 times daily or as directed.       escitalopram 20 MG tablet    LEXAPRO    90 tablet    One per day       estradiol 1 MG tablet    ESTRACE    90 tablet    Take 1 tablet (1 mg) by mouth daily       ferrous sulfate 325 (65 FE) MG tablet    IRON    60 tablet    Take 1 tablet (325 mg) by mouth 2 times daily With meals       FLOMAX 0.4 MG capsule   Generic drug:  tamsulosin      Take 0.4 mg by mouth At Bedtime       fluticasone 50 MCG/ACT spray    FLONASE     Spray 1 spray into both nostrils daily       gabapentin 300 MG capsule    NEURONTIN    90 capsule    Take 1 capsule (300 mg) by mouth 3 times daily       hydrochlorothiazide 12.5 MG capsule    MICROZIDE    90 capsule    Take 1 capsule (12.5 mg) by mouth daily       HYDROmorphone 2 MG tablet    DILAUDID    30 tablet    Take 1 tablet (2 mg) by mouth every 3 hours as needed for moderate to severe pain       RENÉE Pack      Take 1 packet by mouth 2 times daily       lactulose 10 GM/15ML solution    CHRONULAC    946 mL    Take 30  mLs (20 g) by mouth daily as needed for constipation       latanoprost 0.005 % ophthalmic solution    XALATAN    2.5 mL    Place 1 drop into both eyes At Bedtime       levETIRAcetam 500 MG tablet    KEPPRA    180 tablet    Take 1 tablet (500 mg) by mouth 2 times daily       lidocaine 5 % Patch    LIDODERM    30 patch    Apply from 1 to 3 patches to painful area at once for up to 12 h within a 24 h period.  Remove after 12 hours.       lisinopril 10 MG tablet    PRINIVIL/ZESTRIL    90 tablet    Take 1 tablet (10 mg) by mouth daily       LYRICA 100 MG capsule   Generic drug:  pregabalin      Take 100 mg by mouth 2 times daily       MEDICATION GIVEN BY INTRATHECAL PUMP - INSTRUCTION      continuous 4/11/2016 per [facility] [place] [phone]: Conc: Bupivacaine 20 mg/mL and Fentanyl 2000 mcg/mL. Continuous: Bupivacaine 5.052 mg/day and Fentanyl 505.2 mcg/day. Boluses: Up to 7 boluses per 24-hr period of Bupivicaine 0.5992 mg and Fentanyl 59.9 mcg Max daily dose: Bupivacaine 9.1973 mg/day and Fentanyl 919.5883 mcg/day  Pump Last Fill Date:  6/30/2016 Pump Refill Date: 9/20/2016       metoprolol 25 MG 24 hr tablet    TOPROL-XL    30 tablet    Take 1 tablet (25 mg) by mouth daily       MULTIVITAMIN PO      Take 1 tablet by mouth daily       nitroglycerin 0.4 MG sublingual tablet    NITROSTAT    25 tablet    Place 1 tablet (0.4 mg) under the tongue every 5 minutes as needed for chest pain if you are still having symptoms after 3 doses (15 minutes) call 911.       ondansetron 4 MG ODT tab    ZOFRAN-ODT    120 tablet    Take 1 tablet (4 mg) by mouth every 6 hours       * order for DME     1 Month    Equipment being ordered: Depends briefs       * order for DME     1 Device    Equipment being ordered: Power Wheelchair       * order for DME     1 Units    Equipment being ordered: Nebulizer and supplies       * order for DME     1 Box    Equipment being ordered: Glucerna daily shakes with each  meal       * order for DME     1 Device    ACcu check BID       * order for DME     1 Units    Equipment being ordered: Nebulizer and tubing supplies       * order for DME     1 Device    Equipment being ordered: CPAP supplies mask, hose, filters, cushion fax to [facility] at [phone] Disp: 10 DeviceRefills: prn Class: Local PrintStart: 2/10/2017       * order for DME     10 Device    Equipment being ordered: CPAP supplies mask, hose, filters, cushion  fax to [facility] at [phone]       * order for DME     1 Device    Equipment being ordered: wheelchair seat cushion       pantoprazole 40 MG EC tablet    PROTONIX     Take 40 mg by mouth daily       Phenytoin Sodium Extended 200 MG Caps     60 capsule    Take 200 mg by mouth 2 times daily       polyethylene glycol powder    MIRALAX/GLYCOLAX     Take 17 g by mouth daily       prochlorperazine 10 MG tablet    COMPAZINE    20 tablet    Take 1 tablet (10 mg) by mouth every 8 hours as needed       risperiDONE 0.5 MG tablet    risperDAL     Take 0.5 mg by mouth At Bedtime       rOPINIRole 0.25 MG tablet    REQUIP    270 tablet    Take 3 tablets (0.75 mg) by mouth At Bedtime       SPIRIVA HANDIHALER 18 MCG capsule   Generic drug:  tiotropium     30 capsule    INHALE THE CONTENTS OF 1 CAPSULE VIA INHALATION DEVICE DAILY       sucralfate 1 GM tablet    CARAFATE    40 tablet    Take 1 tablet (1 g) by mouth 4 times daily May dissolve in 10 mL water is necessary. (Start upon completion of carafate suspension)       traZODone 100 MG tablet    DESYREL    90 tablet    Take 1 tablet (100 mg) by mouth At Bedtime       vitamin D 2000 UNITS tablet     100 tablet    Take 2,000 Units by mouth daily.       zinc 50 MG Tabs      Take 1 tablet by mouth daily       * Notice:  This list has 12 medication(s) that are the same as other medications prescribed for you. Read the directions carefully, and ask your doctor or other care provider to review them with you.

## 2017-04-04 NOTE — LETTER
4/4/2017    Allan Casey MD  [facility]   [address] [facility] [address]    RE: Sonya Foote       Dear Colleague,    I again had the pleasure of seeing your patient, Sonya Foote, at [facility] for history of coronary artery disease and panvascular disease.  Sonya Foote is a pleasant 67-year-old female with a history of chronic dysphagia, esophageal strictures and previous dilatations.  She underwent a left above-the-knee amputation for peripheral vascular disease and thrombosis on 06/06/2016 and a right above-the-knee amputation for peripheral artery disease on 11/23/2016.  Additionally the patient presented for unstable angina and inferior wall myocardial infarction to the [facility] in September.  She underwent coronary angiography with high-grade stenosis in the proximal to mid right coronary artery.  Intracoronary stenting was performed.  Her ejection fraction was 35%-40%.  The patient has remained on aspirin and Plavix since that time.  Two weeks ago she had an episode of chest discomfort with sublingual nitroglycerin relieving the symptoms.  She has not had any further symptoms.  The patient believes she had a previous myocardial infarction in 2005 at the time she had a hemorrhagic stroke and kidney failure.  There was evidence of LAD distribution akinesis as well as the inferior wall akinesis on an echocardiogram in 12/2016.  The patient denies significant shortness of breath.  She remains at assisted living.  Her medications are all reviewed.  She has ongoing cerebral artery disease including her carotids as well.  Ultrasound of the bilateral carotid arteries 04/01/2016 demonstrated 50%-79% stenosis in the internal carotid arteries bilaterally.  Velocities in the left internal carotid artery stent improved compared to previous after an angioplasty of that vessel on 04/01/2016.      PHYSICAL EXAMINATION:   VITAL SIGNS:  Current blood pressure is  155/75, pulse 68 and regular.   CHEST:  Clear to auscultation without wheezes, rales or rhonchi.   CARDIAC:  Regular rate and rhythm, normal S1 and S2 with an S4 gallop but no S3 or murmur.  No heave, rub or thrill.  No JVD or HJR.  Pulses to the arms are 3+/4+.     ABDOMEN:  Benign without organomegaly.   EXTREMITIES:  Show bilateral above-the-knee amputations.     Outpatient Encounter Prescriptions as of 4/4/2017   Medication Sig Dispense Refill     lisinopril (PRINIVIL/ZESTRIL) 10 MG tablet Take 1 tablet (10 mg) by mouth daily 90 tablet 3     pantoprazole (PROTONIX) 40 MG EC tablet Take 40 mg by mouth daily       pregabalin (LYRICA) 100 MG capsule Take 100 mg by mouth 2 times daily       risperiDONE (RISPERDAL) 0.5 MG tablet Take 0.5 mg by mouth At Bedtime       ferrous sulfate (IRON) 325 (65 FE) MG tablet Take 1 tablet (325 mg) by mouth 2 times daily With meals 60 tablet 2     sucralfate (CARAFATE) 1 GM tablet Take 1 tablet (1 g) by mouth 4 times daily May dissolve in 10 mL water is necessary. (Start upon completion of carafate suspension) 40 tablet 5     albuterol (2.5 MG/3ML) 0.083% neb solution INHALE 1 VIAL VIA NEBULIZER EVERY 6 HOURS AS NEEDED 360 mL 11     ondansetron (ZOFRAN-ODT) 4 MG ODT tab Take 1 tablet (4 mg) by mouth every 6 hours (Patient taking differently: Take 4 mg by mouth every 6 hours as needed ) 120 tablet 0     metoprolol (TOPROL-XL) 25 MG 24 hr tablet Take 1 tablet (25 mg) by mouth daily 30 tablet 0     CYANOCOBALAMIN PO Take 2,000 mcg by mouth daily       gabapentin (NEURONTIN) 300 MG capsule Take 1 capsule (300 mg) by mouth 3 times daily 90 capsule 3     Nutritional Supplements (RENÉE) PACK Take 1 packet by mouth 2 times daily       polyethylene glycol (MIRALAX/GLYCOLAX) powder Take 17 g by mouth daily       HYDROmorphone (DILAUDID) 2 MG tablet Take 1 tablet (2 mg) by mouth every 3 hours as needed for moderate to severe pain 30 tablet 0     fluticasone (FLONASE) 50 MCG/ACT nasal spray  Spray 1 spray into both nostrils daily       ADVAIR DISKUS 250-50 MCG/DOSE diskus inhaler Inhale 1 puff into the lungs 2 times daily        calcium citrate-vitamin D (CITRACAL) 315-250 MG-UNIT TABS Take 2 tablets by mouth daily 120 tablet 5     hydrochlorothiazide (MICROZIDE) 12.5 MG capsule Take 1 capsule (12.5 mg) by mouth daily 90 capsule 3     escitalopram (LEXAPRO) 20 MG tablet One per day 90 tablet 3     estradiol (ESTRACE) 1 MG tablet Take 1 tablet (1 mg) by mouth daily 90 tablet 3     levETIRAcetam (KEPPRA) 500 MG tablet Take 1 tablet (500 mg) by mouth 2 times daily 180 tablet 1     traZODone (DESYREL) 100 MG tablet Take 1 tablet (100 mg) by mouth At Bedtime 90 tablet 3     rOPINIRole (REQUIP) 0.25 MG tablet Take 3 tablets (0.75 mg) by mouth At Bedtime 270 tablet 1     aspirin EC 81 MG EC tablet Take 1 tablet (81 mg) by mouth daily 90 tablet 3     clopidogrel (PLAVIX) 75 MG tablet Take 1 tablet (75 mg) by mouth daily (Patient taking differently: Take 75 mg by mouth At Bedtime ) 90 tablet 3     phenytoin 200 MG CAPS Take 200 mg by mouth 2 times daily (Patient taking differently: Take 200 mg by mouth 2 times daily (takes at 8 AM and 8 PM)) 60 capsule 0     DOCUSATE SODIUM PO Take 100 mg by mouth daily       tamsulosin (FLOMAX) 0.4 MG 24 hr capsule Take 0.4 mg by mouth At Bedtime       dorzolamide (TRUSOPT) 2 % ophthalmic solution Place 1 drop into both eyes 2 times daily        diazepam (VALIUM) 5 MG tablet Take 1 tablet (5 mg) by mouth every 6 hours as needed for anxiety 20 tablet 1     zinc 50 MG TABS Take 1 tablet by mouth daily       cetirizine (ZYRTEC) 10 MG tablet Take 1 tablet (10 mg) by mouth every evening 90 tablet 3     nitroglycerin (NITROSTAT) 0.4 MG SL tablet Place 1 tablet (0.4 mg) under the tongue every 5 minutes as needed for chest pain if you are still having symptoms after 3 doses (15 minutes) call 911. 25 tablet 1     atorvastatin (LIPITOR) 40 MG tablet Take 1 tablet (40 mg) by mouth daily  (Patient taking differently: Take 40 mg by mouth At Bedtime ) 90 tablet 3     prochlorperazine (COMPAZINE) 10 MG tablet Take 1 tablet (10 mg) by mouth every 8 hours as needed 20 tablet 0     Cholecalciferol (VITAMIN D) 2000 UNITS tablet Take 2,000 Units by mouth daily. 100 tablet 3     Multiple Vitamin (MULTIVITAMIN OR) Take 1 tablet by mouth daily        lidocaine (LIDODERM) 5 % Patch Apply from 1 to 3 patches to painful area at once for up to 12 h within a 24 h period.  Remove after 12 hours. 30 patch 1     blood glucose monitoring (ONE TOUCH ULTRA) test strip Use to test blood sugars 3 times daily or as directed. 3 Box 3     order for DME Equipment being ordered: wheelchair seat cushion 1 Device 0     order for DME Equipment being ordered: CPAP supplies mask, hose, filters, cushion    fax to [facility] at [phone] 10 Device prn     order for DME Equipment being ordered: CPAP supplies mask, hose, filters, cushion fax to [facility] at [phone]  Disp: 10 Device Refills: prn   Class: Local Print Start: 2/10/2017 1 Device 0     order for DME Equipment being ordered: Nebulizer and tubing supplies 1 Units 0     order for DME ACcu check BID 1 Device 0     [DISCONTINUED] lisinopril (PRINIVIL,ZESTRIL) 2.5 MG tablet Take 1 tablet (2.5 mg) by mouth daily 90 tablet 3     blood glucose monitoring (ONE TOUCH ULTRA 2) meter device kit Use to test blood sugars 3 times daily or as directed. 1 kit 0     blood glucose monitoring (ONE TOUCH ULTRASOFT) lancets Use to test blood sugar 3 times daily or as directed. 3 Box 3     SPIRIVA HANDIHALER 18 MCG inhalation capsule INHALE THE CONTENTS OF 1 CAPSULE VIA INHALATION DEVICE DAILY 30 capsule 2     MEDICATION GIVEN BY INTRATHECAL PUMP - INSTRUCTION continuous 4/11/2016 per [facility] [place] [phone]:  Conc: Bupivacaine 20 mg/mL and Fentanyl 2000 mcg/mL.  Continuous: Bupivacaine 5.052 mg/day and Fentanyl 505.2 mcg/day.  Boluses: Up to 7  boluses per 24-hr period of Bupivicaine 0.5992 mg and Fentanyl 59.9 mcg  Max daily dose: Bupivacaine 9.1973 mg/day and Fentanyl 919.5883 mcg/day    Pump Last Fill Date:  6/30/2016  Pump Refill Date: 9/20/2016       latanoprost (XALATAN) 0.005 % ophthalmic solution Place 1 drop into both eyes At Bedtime 2.5 mL 11     lactulose (CHRONULAC) 10 GM/15ML solution Take 30 mLs (20 g) by mouth daily as needed for constipation 946 mL 11     order for DME Equipment being ordered: Glucerna daily shakes with each meal 1 Box 11     thin (NO BRAND SPECIFIED) lancets Use to test blood sugar 3 times daily or as directed. 1 Box 10     ORDER FOR DME Equipment being ordered: Nebulizer and supplies 1 Units 0     ORDER FOR DME Equipment being ordered: Power Wheelchair 1 Device 0     ORDER FOR DME Equipment being ordered: Depends briefs 1 Month 11     EASY TOUCH LANCETS 30G/TWIST MISC        Facility-Administered Encounter Medications as of 4/4/2017   Medication Dose Route Frequency Provider Last Rate Last Dose     neostigmine (PROSTIGMINE) injection    PRN Han Terrazas APRN CRNA   4 mg at 11/21/14 1230     glycopyrrolate (ROBINUL) injection    PRN Han Terrazas APRN CRNA   0.8 mg at 11/21/14 1230      ASSESSMENT:   1.  Sonya Foote is a delightful 67-year-old female who presents for coronary artery disease after her inferior wall MI in 09/2016.  She is currently stable without evidence of congestive heart failure.  Ejection fraction is low at 35%-40%.  This is not low enough to require an automatic implantable defibrillator.  We will continue to follow and consider an echocardiogram in September.  At that time we will also make a decision whether to discontinue her Plavix.  Followup nuclear stress testing may be suggested at that time.   2.  Diabetes mellitus, being taken care of by her primary care physicians.   3.  Peripheral arterial disease status post bilateral above-the-knee amputations.   4.  Systolic hypertension.  I  have taken the liberty of increasing her lisinopril to 10 mg daily.  This is for blood pressure and low ejection fraction.  I have asked her assisted living personnel to provide us with a blood pressure in 1 month.  We will also obtain a BMP at that time.  We will up-titrate her lisinopril as necessary to maintain a systolic blood pressure below 130.      It is my pleasure to assist in the care of Sonya Foote.  All her questions were answered to her satisfaction.     Sincerely,    Bj Anderson MD     [facility]

## 2017-04-05 ENCOUNTER — TELEPHONE (OUTPATIENT)
Dept: CARDIOLOGY | Facility: CLINIC | Age: 67
End: 2017-04-05

## 2017-04-05 ENCOUNTER — TELEPHONE (OUTPATIENT)
Dept: PHARMACY | Facility: CLINIC | Age: 67
End: 2017-04-05

## 2017-04-05 DIAGNOSIS — G62.9 PERIPHERAL NERVE DISEASE: Chronic | ICD-10-CM

## 2017-04-05 DIAGNOSIS — G25.81 RESTLESS LEGS SYNDROME (RLS): Primary | ICD-10-CM

## 2017-04-05 DIAGNOSIS — J30.2 SEASONAL ALLERGIC RHINITIS, UNSPECIFIED ALLERGIC RHINITIS TRIGGER: ICD-10-CM

## 2017-04-05 DIAGNOSIS — T63.444D BEE STING REACTION, UNDETERMINED INTENT, SUBSEQUENT ENCOUNTER: Primary | ICD-10-CM

## 2017-04-05 DIAGNOSIS — N40.0 BPH (BENIGN PROSTATIC HYPERPLASIA): ICD-10-CM

## 2017-04-05 NOTE — TELEPHONE ENCOUNTER
Received call from Elizabeth BROOKE at patients [facility] called stating that patient received a prescription for lisinopril but had lost it and was wondering if writer could call prescription into the pharmacy at [facility]. Writer called in prescription. Faxed OV note to [facility] at [phone] and prescription report per request.    4/4/17 OV with Dr. Anderson   4. Systolic hypertension. I have taken the liberty of increasing her lisinopril to 10 mg daily. This is for blood pressure and low ejection fraction. I have asked her assisted living personnel to provide us with a blood pressure in 1 month. We will also obtain a BMP at that time. We will up-titrate her lisinopril as necessary to maintain a systolic blood pressure below 130.

## 2017-04-05 NOTE — TELEPHONE ENCOUNTER
Reason for Call:  Medication or medication refill:    Do you use a [facility]?  Name of the pharmacy and phone number for the current request:  [facility] [address] [place] - [phone] Fax [phone]    Name of the medication requested:   Epi-Pen     Other request: allergy to bees & fire ants.  Patient was previously informed by someone that the doctor is [place] of [place] and wants to know whom would be filling her RX's then asap?    Can we leave a detailed message on this number? YES    Phone number patient can be reached at: Home number on file [phone] (home)    Best Time: anytime    Call taken on 4/5/2017 at 3:36 PM by Gris Muñiz

## 2017-04-05 NOTE — TELEPHONE ENCOUNTER
Armin Kramer,  Please see MTM visit from 3/31/17.  Please let me know if you are agreeable to recommended changes.  If you are, I am happy to place orders and inform patient and assisted living of any changes.  Thank you,    Elizabeth Rose, Pharm.D.,[facility] [place]  Board Certified Geriatric Pharmacist  Medication Therapy Management Pharmacist  [phone]

## 2017-04-05 NOTE — PROGRESS NOTES
HISTORY OF PRESENT ILLNESS:  I again had the pleasure of seeing your patient, Sonya Foote, at [facility] for history of coronary artery disease and panvascular disease.  Sonya Foote is a pleasant 67-year-old female with a history of chronic dysphagia, esophageal strictures and previous dilatations.  She underwent a left above-the-knee amputation for peripheral vascular disease and thrombosis on 06/06/2016 and a right above-the-knee amputation for peripheral artery disease on 11/23/2016.  Additionally the patient presented for unstable angina and inferior wall myocardial infarction to the [facility] in September.  She underwent coronary angiography with high-grade stenosis in the proximal to mid right coronary artery.  Intracoronary stenting was performed.  Her ejection fraction was 35%-40%.  The patient has remained on aspirin and Plavix since that time.  Two weeks ago she had an episode of chest discomfort with sublingual nitroglycerin relieving the symptoms.  She has not had any further symptoms.  The patient believes she had a previous myocardial infarction in 2005 at the time she had a hemorrhagic stroke and kidney failure.  There was evidence of LAD distribution akinesis as well as the inferior wall akinesis on an echocardiogram in 12/2016.  The patient denies significant shortness of breath.  She remains at assisted living.  Her medications are all reviewed.  She has ongoing cerebral artery disease including her carotids as well.  Ultrasound of the bilateral carotid arteries 04/01/2016 demonstrated 50%-79% stenosis in the internal carotid arteries bilaterally.  Velocities in the left internal carotid artery stent improved compared to previous after an angioplasty of that vessel on 04/01/2016.      PHYSICAL EXAMINATION:   VITAL SIGNS:  Current blood pressure is 155/75, pulse 68 and regular.   CHEST:  Clear to auscultation without wheezes, rales or rhonchi.   CARDIAC:  Regular  rate and rhythm, normal S1 and S2 with an S4 gallop but no S3 or murmur.  No heave, rub or thrill.  No JVD or HJR.  Pulses to the arms are 3+/4+.     ABDOMEN:  Benign without organomegaly.   EXTREMITIES:  Show bilateral above-the-knee amputations.      ASSESSMENT:   1.  Sharath Mercedes is a delightful 67-year-old female who presents for coronary artery disease after her inferior wall MI in 2016.  She is currently stable without evidence of congestive heart failure.  Ejection fraction is low at 35%-40%.  This is not low enough to require an automatic implantable defibrillator.  We will continue to follow and consider an echocardiogram in September.  At that time we will also make a decision whether to discontinue her Plavix.  Followup nuclear stress testing may be suggested at that time.   2.  Diabetes mellitus, being taken care of by her primary care physicians.   3.  Peripheral arterial disease status post bilateral above-the-knee amputations.   4.  Systolic hypertension.  I have taken the liberty of increasing her lisinopril to 10 mg daily.  This is for blood pressure and low ejection fraction.  I have asked her assisted living personnel to provide us with a blood pressure in 1 month.  We will also obtain a BMP at that time.  We will up-titrate her lisinopril as necessary to maintain a systolic blood pressure below 130.      It is my pleasure to assist in the care of Sharath Mercedes.  All her questions were answered to her satisfaction.      Rosario Gaines MD      cc:   Allan Casey MD   [address]  [address]         ROS[facility] GAINES MD, [facility]             D: 2017 15:36   T: 2017 07:25   MT: VD      Name:     SHARATH MERCEDES   MRN:      [id number]        Account:      [id number]   :      1950           Service Date: 2017      Document: [id number]

## 2017-04-06 ENCOUNTER — TELEPHONE (OUTPATIENT)
Dept: INTERNAL MEDICINE | Facility: CLINIC | Age: 67
End: 2017-04-06

## 2017-04-06 PROBLEM — T63.444D: Status: ACTIVE | Noted: 2017-04-06

## 2017-04-06 RX ORDER — TAMSULOSIN HYDROCHLORIDE 0.4 MG/1
0.4 CAPSULE ORAL EVERY OTHER DAY
Qty: 45 CAPSULE | Refills: 1
Start: 2017-04-06 | End: 2017-04-28

## 2017-04-06 RX ORDER — GABAPENTIN 300 MG/1
300 CAPSULE ORAL
Qty: 21 CAPSULE | Refills: 0
Start: 2017-04-06 | End: 2017-04-26

## 2017-04-06 RX ORDER — CETIRIZINE HYDROCHLORIDE 10 MG/1
10 TABLET ORAL DAILY PRN
Qty: 90 TABLET | Refills: 1
Start: 2017-04-06 | End: 2017-05-02

## 2017-04-06 RX ORDER — ROPINIROLE 0.25 MG/1
0.5 TABLET, FILM COATED ORAL AT BEDTIME
Qty: 9 TABLET | Refills: 0
Start: 2017-04-06 | End: 2017-04-26

## 2017-04-06 RX ORDER — EPINEPHRINE 0.15 MG/.3ML
0.15 INJECTION INTRAMUSCULAR PRN
Qty: 0.6 ML | Refills: 1 | Status: ON HOLD | OUTPATIENT
Start: 2017-04-06 | End: 2018-05-02

## 2017-04-06 NOTE — TELEPHONE ENCOUNTER
Epi Pen  Last Written Prescription Date:  D/C medication   Last Office Visit with [facility], [facility] or [facility] prescribing provider: 3/16/17  Future Office visit:       Routing refill request to provider for review/approval because:  Drug not active on patient's medication list

## 2017-04-06 NOTE — TELEPHONE ENCOUNTER
Dr. Casey,    We received a new prescription for Epipen JR for Sonya.  Based on her weight she should be able to get the regular Epipen.  Can you clarify if you want her on the regular epipen or the JR?    Thanks,    Vasu Estrada, PharmD  [facility]  [phone]

## 2017-04-06 NOTE — TELEPHONE ENCOUNTER
Thanks .  I faxed orders over to the Assisted Living with the changes.  I also spoke to Sonya.  She wants to wait on reduction of the Estradiol and the Sucralfate, and attempt those reductions in a couple weeks when I follow-up with her.    Elizabeth Rose, Pharm.D.,[facility] [place]  Board Certified Geriatric Pharmacist  Medication Therapy Management Pharmacist  [phone]

## 2017-04-10 ENCOUNTER — OFFICE VISIT (OUTPATIENT)
Dept: INTERNAL MEDICINE | Facility: CLINIC | Age: 67
End: 2017-04-10
Payer: COMMERCIAL

## 2017-04-10 ENCOUNTER — RADIANT APPOINTMENT (OUTPATIENT)
Dept: GENERAL RADIOLOGY | Facility: CLINIC | Age: 67
End: 2017-04-10
Attending: INTERNAL MEDICINE
Payer: COMMERCIAL

## 2017-04-10 VITALS
WEIGHT: 133 LBS | BODY MASS INDEX: 20.83 KG/M2 | SYSTOLIC BLOOD PRESSURE: 150 MMHG | OXYGEN SATURATION: 93 % | TEMPERATURE: 97.4 F | DIASTOLIC BLOOD PRESSURE: 80 MMHG | HEART RATE: 87 BPM

## 2017-04-10 DIAGNOSIS — J44.9 CHRONIC OBSTRUCTIVE PULMONARY DISEASE, UNSPECIFIED COPD TYPE (H): Chronic | ICD-10-CM

## 2017-04-10 DIAGNOSIS — S09.90XA HEAD TRAUMA, INITIAL ENCOUNTER: ICD-10-CM

## 2017-04-10 DIAGNOSIS — S09.90XA HEAD TRAUMA, INITIAL ENCOUNTER: Primary | ICD-10-CM

## 2017-04-10 DIAGNOSIS — M19.041 PRIMARY OSTEOARTHRITIS OF BOTH HANDS: Primary | ICD-10-CM

## 2017-04-10 DIAGNOSIS — M19.042 PRIMARY OSTEOARTHRITIS OF BOTH HANDS: Primary | ICD-10-CM

## 2017-04-10 PROCEDURE — 72040 X-RAY EXAM NECK SPINE 2-3 VW: CPT

## 2017-04-10 PROCEDURE — 99215 OFFICE O/P EST HI 40 MIN: CPT | Performed by: INTERNAL MEDICINE

## 2017-04-10 RX ORDER — ALBUTEROL SULFATE 90 UG/1
1-2 AEROSOL, METERED RESPIRATORY (INHALATION) EVERY 6 HOURS PRN
Qty: 1 INHALER | Refills: 11 | Status: ON HOLD | OUTPATIENT
Start: 2017-04-10 | End: 2017-05-31

## 2017-04-10 NOTE — PROGRESS NOTES
Pt requesting topical med for OA in hands.   suggested previously to try Voltaren gel.   ok'd start of this.  Order placed, and pt called to inform how to use this.    Elizabeth Rose, Pharm.D.,[facility] [place]  Board Certified Geriatric Pharmacist  Medication Therapy Management Pharmacist  [phone]

## 2017-04-10 NOTE — PROGRESS NOTES
SUBJECTIVE:                                                    Sonya Foote is a 67 year old female who presents to clinic today for the following health issues:  Patient seen today as Dr. Laura michael.    Falls      Duration: 3 recent falls, most recent 4 days ago     Description (location/character/radiation): Patient fell out of wheel chair while reaching for something, hitting R side of head on metal pole of bed, No LOC.    Intensity:  moderate    Accompanying signs and symptoms: bump on head, 1 episode of vomiting since, RUBIO     History (similar episodes/previous evaluation): None    Precipitating or alleviating factors: None    Therapies tried and outcome: Zofran helped with nausea      Other concerns:  1. Requesting topical medication to help with OA of hands    Problem list and histories reviewed & adjusted, as indicated.  Additional history: as documented    Patient Active Problem List   Diagnosis     Hyperlipidemia LDL goal <100     Seizure disorder (H)     ACP (advance care planning)     Osteoporosis     Schizoaffective disorder (H)     AS (sickle cell trait) (H)     Vertigo     Person who has had sex change operation     Claudication in peripheral vascular disease (H)     Intestinal malabsorption     GIB (gastrointestinal bleeding)     Cervicalgia     Health Care Home     Asthma     Adjustment disorder with depressed mood     Chronic pain syndrome     Open-angle glaucoma     History of colonic polyps     Old myocardial infarction     Iron deficiency anemia     Late effects of cerebrovascular disease     Degeneration of intervertebral disc of lumbosacral region     Thoracic or lumbosacral neuritis or radiculitis     Cerebral infarction due to occlusion or stenosis of carotid artery     Disorder of bone and cartilage     Hereditary and idiopathic peripheral neuropathy     Androgen insensitivity syndrome     PAD (peripheral artery disease) (H)     Chronic systolic congestive heart failure (H)     Carotid  stenosis, left     Primary osteoarthritis of both hands     Pain in both upper extremities     Atherosclerotic peripheral vascular disease with rest pain (H)     Essential hypertension with goal blood pressure less than 130/80     Cellulitis of right ankle     Angina pectoris, crescendo (H)     Type 2 diabetes mellitus with diabetic peripheral angiopathy without gangrene, without long-term current use of insulin (H)     Anemia, unspecified type     Critical lower limb ischemia     Testicular feminization     Anxiety disorder due to general medical condition     Anxiety disorder     Central retinal artery occlusion     Lumbosacral radiculitis     Peripheral nerve disease (H)     Osteopenia     Prediabetes     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 (H)     Primary open angle glaucoma of both eyes, severe stage     Pseudophakia of right eye     Cataract, left eye     Diabetes mellitus type 2 without retinopathy (H)     Pyelonephritis     Gastroesophageal reflux disease without esophagitis     Chronic obstructive pulmonary disease, unspecified COPD type (H)     JOSE (obstructive sleep apnea)     Complex sleep apnea syndrome     Coronary artery disease of native artery of native heart with stable angina pectoris (H)     Hyperlipidemia LDL goal <70     Ischemic cardiomyopathy     Bee sting reaction, undetermined intent, subsequent encounter     Past Surgical History:   Procedure Laterality Date     AMPUTATE LEG ABOVE KNEE Left 6/11/2016    Procedure: AMPUTATE LEG ABOVE KNEE;  Surgeon: Mello Rodriguez MD;  [facility]     AMPUTATE LEG BELOW KNEE Right 11/7/2016    Procedure: AMPUTATE LEG BELOW KNEE;  Surgeon: Savannah Durant MD;  [facility]     AMPUTATE REVISION STUMP LOWER EXTREMITY Right 11/11/2016    Procedure: AMPUTATE REVISION STUMP LOWER EXTREMITY;  Surgeon: Savannah Durant MD;  [facility]     AMPUTATE REVISION STUMP LOWER EXTREMITY Right 11/16/2016    Procedure: AMPUTATE REVISION  STUMP LOWER EXTREMITY;  Surgeon: Savannah Durant MD;  [facility]     AMPUTATE TOE(S) Right 1/5/2016    Procedure: AMPUTATE TOE(S);  Surgeon: Mello Gaines DPM;  [facility]     ANGIOGRAM Bilateral 11/21/2014    Procedure: ANGIOGRAM;  Surgeon: Savannah Durant MD;  [facility]     ANGIOGRAM Left 1/16/2015    Procedure: ANGIOGRAM;  Surgeon: Savannah Durant MD;  [facility]     ANGIOGRAM Bilateral 9/14/2015    Procedure: ANGIOGRAM;  Surgeon: Savannah Durant MD;  [facility]     ANGIOGRAM Left 10/12/2015    Procedure: ANGIOGRAM;  Surgeon: Savannah Durant MD;  [facility]     ANGIOGRAM Right 6/6/2016    Procedure: ANGIOGRAM;  Surgeon: Savannah Durant MD;  [facility]     ANGIOPLASTY Right 6/6/2016    Procedure: ANGIOPLASTY;  Surgeon: Savannah Durant MD;  [facility]     APPENDECTOMY       BREAST SURGERY      right breast bx (benign)     CHOLECYSTECTOMY       COLONOSCOPY N/A 8/25/2014    Procedure: COLONOSCOPY;  Surgeon: Mello Ferrer MD;  [facility]     COLONOSCOPY WITH CO2 INSUFFLATION N/A 8/20/2014    Procedure: COLONOSCOPY WITH CO2 INSUFFLATION;  Surgeon: Duane, William Charles, MD;  [facility]     ENDARTERECTOMY FEMORAL  5/23/2014    Procedure: ENDARTERECTOMY FEMORAL;  Surgeon: Jason Joshi MD;  [facility]     ESOPHAGOSCOPY, GASTROSCOPY, DUODENOSCOPY (EGD), COMBINED  12/14/2012    Procedure: COMBINED ESOPHAGOSCOPY, GASTROSCOPY, DUODENOSCOPY (EGD), BIOPSY SINGLE OR MULTIPLE;  ESOPHAGOSCOPY, GASTROSCOPY, DUODENOSCOPY (EGD), DILATATION ;  Surgeon: Elizabeth Stevenson MD;  Location:  GI     ESOPHAGOSCOPY, GASTROSCOPY, DUODENOSCOPY (EGD), COMBINED  12/31/2013    Procedure: COMBINED ESOPHAGOSCOPY, GASTROSCOPY, DUODENOSCOPY (EGD), BIOPSY SINGLE OR MULTIPLE;;  Surgeon: Clemente Lopez MD;  [facility]     ESOPHAGOSCOPY, GASTROSCOPY, DUODENOSCOPY (EGD), COMBINED  4/1/2014    Procedure: COMBINED ESOPHAGOSCOPY, GASTROSCOPY, DUODENOSCOPY (EGD);;  Surgeon: Clemente Lopez,  MD;  [facility]     ESOPHAGOSCOPY, GASTROSCOPY, DUODENOSCOPY (EGD), COMBINED  6/28/2014    Procedure: COMBINED ESOPHAGOSCOPY, GASTROSCOPY, DUODENOSCOPY (EGD);  Surgeon: Clemente Lopez MD;  [facility]     ESOPHAGOSCOPY, GASTROSCOPY, DUODENOSCOPY (EGD), COMBINED N/A 8/20/2014    Procedure: COMBINED ESOPHAGOSCOPY, GASTROSCOPY, DUODENOSCOPY (EGD), BIOPSY SINGLE OR MULTIPLE;  Surgeon: Duane, William Charles, MD;  Location:  OR     ESOPHAGOSCOPY, GASTROSCOPY, DUODENOSCOPY (EGD), COMBINED N/A 8/22/2014    Procedure: COMBINED ESOPHAGOSCOPY, GASTROSCOPY, DUODENOSCOPY (EGD), BIOPSY SINGLE OR MULTIPLE;  Surgeon: Mello Ferrer MD;  [facility]     ESOPHAGOSCOPY, GASTROSCOPY, DUODENOSCOPY (EGD), COMBINED N/A 10/2/2014    Procedure: COMBINED ESOPHAGOSCOPY, GASTROSCOPY, DUODENOSCOPY (EGD), BIOPSY SINGLE OR MULTIPLE;  Surgeon: Remy Haskins MD;  [facility]     ESOPHAGOSCOPY, GASTROSCOPY, DUODENOSCOPY (EGD), COMBINED Left 12/15/2014    Procedure: COMBINED ESOPHAGOSCOPY, GASTROSCOPY, DUODENOSCOPY (EGD), BIOPSY SINGLE OR MULTIPLE;  Surgeon: Remy Haskins MD;  [facility]     ESOPHAGOSCOPY, GASTROSCOPY, DUODENOSCOPY (EGD), COMBINED N/A 2/25/2015    Procedure: COMBINED ENDOSCOPIC ULTRASOUND, ESOPHAGOSCOPY, GASTROSCOPY, DUODENOSCOPY (EGD), FINE NEEDLE ASPIRATE/BIOPSY;  Surgeon: Clemente Lugo MD;  [facility]     ESOPHAGOSCOPY, GASTROSCOPY, DUODENOSCOPY (EGD), COMBINED Left 2/25/2015    Procedure: COMBINED ESOPHAGOSCOPY, GASTROSCOPY, DUODENOSCOPY (EGD), BIOPSY SINGLE OR MULTIPLE;  Surgeon: Clemente Lugo MD;  [facility]     ESOPHAGOSCOPY, GASTROSCOPY, DUODENOSCOPY (EGD), COMBINED N/A 9/25/2016    Procedure: COMBINED ESOPHAGOSCOPY, GASTROSCOPY, DUODENOSCOPY (EGD);  Surgeon: Aziza Patiño MD;  Location:  [facility]     ESOPHAGOSCOPY, GASTROSCOPY, DUODENOSCOPY (EGD), COMBINED N/A 1/18/2017    Procedure: COMBINED ESOPHAGOSCOPY, GASTROSCOPY, DUODENOSCOPY (EGD), BIOPSY SINGLE OR  MULTIPLE;  Surgeon: Clemente Lopez MD;  [facility]     FASCIOTOMY LOWER EXTREMITY Left 6/10/2016    Procedure: FASCIOTOMY LOWER EXTREMITY;  Surgeon: Mello Rodriguez MD;  [facility]     HC CAPSULE ENDOSCOPY N/A 8/25/2014    Procedure: CAPSULE/PILL CAM ENDOSCOPY;  Surgeon: Remy Haskins MD;  [facility]     HC CAPSULE ENDOSCOPY N/A 10/2/2014    Procedure: CAPSULE/PILL CAM ENDOSCOPY;  Surgeon: Remy Haskins MD;  [facility]     ORTHOPEDIC SURGERY      broken wrist repair     SEX TRANSFORMATION SURGERY, MALE TO FEMALE      1974     SINUS SURGERY      cyst removed     TONSILLECTOMY       VASCULAR SURGERY      Left carotid stent       Social History   Substance Use Topics     Smoking status: Former Smoker     Packs/day: 2.50     Years: 30.00     Types: Cigarettes, Cigars     Quit date: 11/1/2000     Smokeless tobacco: Never Used     Alcohol use No     Family History   Problem Relation Age of Onset     Dementia Mother      Glaucoma Mother      DIABETES Mother      Coronary Artery Disease Mother      MI     Glaucoma Father      DIABETES Father      Heart Failure Father      CANCER Maternal Aunt      leukemia     Schizophrenia Brother      Depression Brother      Suicide Sister      Depression Sister      DIABETES Sister      CANCER Maternal Aunt      ovarian     Glaucoma Maternal Grandmother      DIABETES Maternal Grandmother      Glaucoma Maternal Grandfather      DIABETES Maternal Grandfather      Glaucoma Paternal Grandmother      DIABETES Paternal Grandmother      Glaucoma Paternal Grandfather      DIABETES Paternal Grandfather      Breast Cancer Sister      CEREBROVASCULAR DISEASE Brother          Current Outpatient Prescriptions   Medication Sig Dispense Refill     Albuterol Sulfate (VENTOLIN HFA IN) Inhale 2 puffs into the lungs every 6 hours as needed       EPINEPHrine (EPIPEN JR) 0.15 MG/0.3ML injection Inject 0.3 mLs (0.15 mg) into the muscle as needed for anaphylaxis 0.6 mL  1     gabapentin (NEURONTIN) 300 MG capsule Take 1 capsule (300 mg) by mouth 2 times daily For 1 week then 1 capsule once daily for 1 week, then stop 21 capsule 0     rOPINIRole (REQUIP) 0.25 MG tablet Take 2 tablets (0.5 mg) by mouth At Bedtime For 3 days then 1 tablet (0.25mg) at bedtime for 3 days, then stop 9 tablet 0     tamsulosin (FLOMAX) 0.4 MG capsule Take 1 capsule (0.4 mg) by mouth every other day 45 capsule 1     cetirizine (ZYRTEC) 10 MG tablet Take 1 tablet (10 mg) by mouth daily as needed for allergies 90 tablet 1     lisinopril (PRINIVIL/ZESTRIL) 10 MG tablet Take 1 tablet (10 mg) by mouth daily 90 tablet 3     pantoprazole (PROTONIX) 40 MG EC tablet Take 40 mg by mouth daily       pregabalin (LYRICA) 100 MG capsule Take 100 mg by mouth 2 times daily       risperiDONE (RISPERDAL) 0.5 MG tablet Take 0.5 mg by mouth At Bedtime       ferrous sulfate (IRON) 325 (65 FE) MG tablet Take 1 tablet (325 mg) by mouth 2 times daily With meals 60 tablet 2     lidocaine (LIDODERM) 5 % Patch Apply from 1 to 3 patches to painful area at once for up to 12 h within a 24 h period.  Remove after 12 hours. 30 patch 1     blood glucose monitoring (ONE TOUCH ULTRA) test strip Use to test blood sugars 3 times daily or as directed. 3 Box 3     order for DME Equipment being ordered: wheelchair seat cushion 1 Device 0     order for DME Equipment being ordered: CPAP supplies mask, hose, filters, cushion    fax to [facility] at [phone] 10 Device prn     sucralfate (CARAFATE) 1 GM tablet Take 1 tablet (1 g) by mouth 4 times daily May dissolve in 10 mL water is necessary. (Start upon completion of carafate suspension) 40 tablet 5     order for DME Equipment being ordered: CPAP supplies mask, hose, filters, cushion fax to [facility] at [phone]  Disp: 10 Device Refills: prn   Class: Local Print Start: 2/10/2017 1 Device 0     order for DME Equipment being ordered: Nebulizer and tubing supplies 1 Units 0      albuterol (2.5 MG/3ML) 0.083% neb solution INHALE 1 VIAL VIA NEBULIZER EVERY 6 HOURS AS NEEDED 360 mL 11     order for DME ACcu check BID 1 Device 0     ondansetron (ZOFRAN-ODT) 4 MG ODT tab Take 1 tablet (4 mg) by mouth every 6 hours (Patient taking differently: Take 4 mg by mouth every 6 hours as needed ) 120 tablet 0     metoprolol (TOPROL-XL) 25 MG 24 hr tablet Take 1 tablet (25 mg) by mouth daily 30 tablet 0     CYANOCOBALAMIN PO Take 2,000 mcg by mouth daily       Nutritional Supplements (RENÉE) PACK Take 1 packet by mouth 2 times daily       polyethylene glycol (MIRALAX/GLYCOLAX) powder Take 17 g by mouth daily       HYDROmorphone (DILAUDID) 2 MG tablet Take 1 tablet (2 mg) by mouth every 3 hours as needed for moderate to severe pain 30 tablet 0     fluticasone (FLONASE) 50 MCG/ACT nasal spray Spray 1 spray into both nostrils daily       ADVAIR DISKUS 250-50 MCG/DOSE diskus inhaler Inhale 1 puff into the lungs 2 times daily        calcium citrate-vitamin D (CITRACAL) 315-250 MG-UNIT TABS Take 2 tablets by mouth daily 120 tablet 5     hydrochlorothiazide (MICROZIDE) 12.5 MG capsule Take 1 capsule (12.5 mg) by mouth daily 90 capsule 3     escitalopram (LEXAPRO) 20 MG tablet One per day 90 tablet 3     estradiol (ESTRACE) 1 MG tablet Take 1 tablet (1 mg) by mouth daily 90 tablet 3     levETIRAcetam (KEPPRA) 500 MG tablet Take 1 tablet (500 mg) by mouth 2 times daily 180 tablet 1     traZODone (DESYREL) 100 MG tablet Take 1 tablet (100 mg) by mouth At Bedtime 90 tablet 3     aspirin EC 81 MG EC tablet Take 1 tablet (81 mg) by mouth daily 90 tablet 3     clopidogrel (PLAVIX) 75 MG tablet Take 1 tablet (75 mg) by mouth daily (Patient taking differently: Take 75 mg by mouth At Bedtime ) 90 tablet 3     blood glucose monitoring (ONE TOUCH ULTRA 2) meter device kit Use to test blood sugars 3 times daily or as directed. 1 kit 0     blood glucose monitoring (ONE TOUCH ULTRASOFT) lancets Use to test blood sugar 3 times  daily or as directed. 3 Box 3     phenytoin 200 MG CAPS Take 200 mg by mouth 2 times daily (Patient taking differently: Take 200 mg by mouth 2 times daily (takes at 8 AM and 8 PM)) 60 capsule 0     dorzolamide (TRUSOPT) 2 % ophthalmic solution Place 1 drop into both eyes 2 times daily        SPIRIVA HANDIHALER 18 MCG inhalation capsule INHALE THE CONTENTS OF 1 CAPSULE VIA INHALATION DEVICE DAILY 30 capsule 2     zinc 50 MG TABS Take 1 tablet by mouth daily       nitroglycerin (NITROSTAT) 0.4 MG SL tablet Place 1 tablet (0.4 mg) under the tongue every 5 minutes as needed for chest pain if you are still having symptoms after 3 doses (15 minutes) call 911. 25 tablet 1     MEDICATION GIVEN BY INTRATHECAL PUMP - INSTRUCTION continuous 4/11/2016 per [facility] in [place] [phone]:  Conc: Bupivacaine 20 mg/mL and Fentanyl 2000 mcg/mL.  Continuous: Bupivacaine 5.052 mg/day and Fentanyl 505.2 mcg/day.  Boluses: Up to 7 boluses per 24-hr period of Bupivicaine 0.5992 mg and Fentanyl 59.9 mcg  Max daily dose: Bupivacaine 9.1973 mg/day and Fentanyl 919.5883 mcg/day    Pump Last Fill Date:  6/30/2016  Pump Refill Date: 9/20/2016       latanoprost (XALATAN) 0.005 % ophthalmic solution Place 1 drop into both eyes At Bedtime 2.5 mL 11     atorvastatin (LIPITOR) 40 MG tablet Take 1 tablet (40 mg) by mouth daily (Patient taking differently: Take 40 mg by mouth At Bedtime ) 90 tablet 3     order for DME Equipment being ordered: Glucerna daily shakes with each meal 1 Box 11     prochlorperazine (COMPAZINE) 10 MG tablet Take 1 tablet (10 mg) by mouth every 8 hours as needed 20 tablet 0     thin (NO BRAND SPECIFIED) lancets Use to test blood sugar 3 times daily or as directed. 1 Box 10     ORDER FOR DME Equipment being ordered: Nebulizer and supplies 1 Units 0     ORDER FOR DME Equipment being ordered: Power Wheelchair 1 Device 0     ORDER FOR DME Equipment being ordered: Depends briefs 1 Month 11     EASY  TOUCH LANCETS 30G/TWIST MISC        Cholecalciferol (VITAMIN D) 2000 UNITS tablet Take 2,000 Units by mouth daily. 100 tablet 3     Multiple Vitamin (MULTIVITAMIN OR) Take 1 tablet by mouth daily        Allergies   Allergen Reactions     Bee Venom      Penicillins Anaphylaxis     Other reaction(s): Skin Rash and/or Hives     Dilantin [Phenytoin] Other (See Comments)     Generic dilantin only per pt     Iodide Hives     09/11/15: Pt states she can use iodine and doesn't have any problems      Iodine-131      Novocaine [Procaine] Hives     Other reaction(s): Skin Rash and/or Hives     BP Readings from Last 3 Encounters:   04/04/17 155/75   04/03/17 138/70   03/23/17 138/70    Wt Readings from Last 3 Encounters:   04/03/17 133 lb (60.3 kg)   03/22/17 133 lb (60.3 kg)   03/15/17 135 lb (61.2 kg)           Reviewed and updated as needed this visit by clinical staff  Tobacco  Allergies  Problems  Med Hx  Surg Hx  Fam Hx  Soc Hx      Reviewed and updated as needed this visit by Provider       ROS:  C: NEGATIVE for fever, chills, change in weight  E/M: NEGATIVE for ear, mouth and throat problems  R: NEGATIVE for significant cough or SOB  CV: NEGATIVE for chest pain, palpitations or peripheral edema  : NEGATIVE for frequency, dysuria, or hematuria  M: + for significant arthralgias or myalgia  N: NEGATIVE for weakness, dizziness or paresthesias  H: NEGATIVE for bleeding problems  P: NEGATIVE for changes in mood or affect    OBJECTIVE:                                                    /80  Pulse 87  Temp 97.4  F (36.3  C) (Oral)  Wt 133 lb (60.3 kg)  SpO2 93%  BMI 20.83 kg/m2  Body mass index is 20.83 kg/(m^2).  GENERAL: alert and no distress in wheelchair  EYES: Eyes grossly normal to inspection, extraocular movements - intact, and PERRL  HENT: ear canals- normal; TMs- normal; Nose- normal; Mouth- no ulcers, no lesions  NECK: mild cervical tenderness, R>L neck, no adenopathy, no asymmetry, no masses, no  stiffness; thyroid- normal to palpation  RESP: lungs clear to auscultation - no rales, no rhonchi, no wheezes  CV: regular rates and rhythm, normal S1 S2, no S3 or S4 and no click or rub   ABDOMEN: soft, no tenderness, no  hepatosplenomegaly, no masses, normal bowel sounds  MS: in wheelchair, BKA's noted kendell.  NEURO: no focal changes  PSYCH: Alert and oriented times 3; speech- coherent , normal rate and volume; able to articulate logical thoughts, able to abstract reason, no tangential thoughts, no hallucinations or delusions, affect- normal         ASSESSMENT/PLAN:                                                      (S09.90XA) Head trauma, initial encounter  (primary encounter diagnosis)  Comment: discussed with patient, with nausea and noted history will image neck and head for reassurance although there is limited focal changes on neurological exam noted today.  Plan: CT Head w/o Contrast, XR Cervical Spine 2/3         Views          See Patient Instructions    Allan Casey MD  [facility]    THE MEDICATION LIST HAS BEEN FULLY RECONCILED BY THE M.D. AND THE NURSING STAFF.

## 2017-04-10 NOTE — Clinical Note
Sonya Johnson was here and wants something topical for pain relief for her hands and states Dr. Anderson mentioned it to her but I see nothing in his note.  Any suggestions?  ?Lidocaine gel  Suggestions would be helpful, and if you think option can order.  Dr. Casey

## 2017-04-10 NOTE — MR AVS SNAPSHOT
After Visit Summary   4/10/2017    Sonya Foote    MRN: [id number]           Patient Information     Date Of Birth          1950        Visit Information        Provider Department      4/10/2017 2:00 PM Allan Casey MD [facility]        Today's Diagnoses     Head trauma, initial encounter    -  1       Follow-ups after your visit        Your next 10 appointments already scheduled     Apr 10, 2017  8:55 AM CDT   XR CERVICAL SPINE 2/3 VIEWS with OXXR1   [facility] ([facility])    [address] [id number] [phone]           Please bring a list of your current medicines to your exam. (Include vitamins, minerals and over-thecounter medicines.) Leave your valuables at home.  Tell your doctor if there is a chance you may be pregnant.  You do not need to do anything special for this exam.            Apr 10, 2017  2:00 PM CDT   Office Visit with Allan Casey MD   [facility] ([facility])    [address] [id number] [phone]           Bring a current list of meds and any records pertaining to this visit.  For Physicals, please bring immunization records and any forms needing to be filled out.  Please arrive 10 minutes early to complete paperwork.            Apr 18, 2017 12:30 PM CDT   Return Sleep Patient with Guanaco Kowalski MD   [facility] ([facility])    [address] [place] [address]   [phone]            Apr 26, 2017  8:40 AM CDT   (Arrive by 8:25 AM)   NEW HEART FAILURE with Radha Aldrich MD   [facility] ([facility])    [address]  3rd [facility] [address] [phone]            May 08, 2017  1:00 PM CDT   Nurse Visit with  NURSE   [facility] ([facility])    [address]  [address]  [facility] [address]   [phone]            May 12, 2017 10:50 AM CDT   (Arrive by 10:35 AM)   RETURN DIABETES with Michelle Irizarry MD   [facility] ([facility])    [address]  3rd [address]  [facility] [address]   [phone]            May 18, 2017  1:00 PM CDT   (Arrive by 12:45 PM)   Cystoscopy with Elizabeth Oliver MD   [facility] ([facility])    [address]  4th [address]  [facility] [address]   [phone]            Jul 10, 2017 11:00 AM CDT   (Arrive by 10:45 AM)   Return Visit with Alina Jennings MD   [facility] ([facility])    [address]  [address]rd [facility] [address] [phone]              Future tests that were ordered for you today     Open Future Orders        Priority Expected Expires Ordered    CT Head w/o Contrast Routine  4/10/2018 4/10/2017            Who to contact     If you have questions or need follow up information about today's clinic visit or your schedule please contact [facility] directly at [phone].  Normal or non-critical lab and imaging results will be communicated to you by An[employer], letter or phone within 4 business days after the clinic has received the results. If you do not hear from us within 7 days, please contact the clinic through Anagearhart or phone. If you have a critical or abnormal lab result, we will notify you by phone as soon as possible.  Submit refill requests through [employer] or call your pharmacy and they will forward the refill request to us. Please allow 3 business days for your refill to be completed.          Additional Information About Your Visit        Anagearhart Information     [employer] lets you send messages to your  "doctor, view your test results, renew your prescriptions, schedule appointments and more. To sign up, go to [email or web address] . Click on \"Log in\" on the left side of the screen, which will take you to the Welcome page. Then click on \"Sign up Now\" on the right side of the page.     You will be asked to enter the access code listed below, as well as some personal information. Please follow the directions to create your username and password.     Your access code is: [email or web address]  Expires: 2017 11:52 AM     Your access code will  in 90 days. If you need help or a new code, please call your [place] clinic or [phone].        Care EveryWhere ID     This is your Care EveryWhere ID. This could be used by other organizations to access your [place] medical records  [id number]        Your Vitals Were     Pulse Temperature Pulse Oximetry BMI (Body Mass Index)          87 97.4  F (36.3  C) (Oral) 93% 20.83 kg/m2         Blood Pressure from Last 3 Encounters:   04/10/17 150/80   17 155/75   17 138/70    Weight from Last 3 Encounters:   04/10/17 133 lb (60.3 kg)   17 133 lb (60.3 kg)   17 133 lb (60.3 kg)                 Today's Medication Changes          These changes are accurate as of: 4/10/17  8:53 AM.  If you have any questions, ask your nurse or doctor.               These medicines have changed or have updated prescriptions.        Dose/Directions    atorvastatin 40 MG tablet   Commonly known as:  LIPITOR   This may have changed:  when to take this   Used for:  Hyperlipidemia LDL goal <100        Dose:  40 mg   Take 1 tablet (40 mg) by mouth daily   Quantity:  90 tablet   Refills:  3       clopidogrel 75 MG tablet   Commonly known as:  PLAVIX   This may have changed:  when to take this   Used for:  PAD (peripheral artery disease) (H), UGI bleed, Osteoporosis, Nausea        Dose:  75 mg   Take 1 tablet (75 mg) by mouth daily   Quantity:  90 tablet   Refills:  3       " ondansetron 4 MG ODT tab   Commonly known as:  ZOFRAN-ODT   This may have changed:    - when to take this  - reasons to take this   Used for:  Intractable vomiting with nausea, unspecified vomiting type        Dose:  4 mg   Take 1 tablet (4 mg) by mouth every 6 hours   Quantity:  120 tablet   Refills:  0       Phenytoin Sodium Extended 200 MG Caps   This may have changed:  additional instructions   Used for:  Seizure disorder (H)        Dose:  200 mg   Take 200 mg by mouth 2 times daily   Quantity:  60 capsule   Refills:  0                Primary Care Provider Office Phone # Fax #    Allan Casey [phone]       [facility] [address] [facility] [address]        Thank you!     Thank you for choosing [facility]  for your care. Our goal is always to provide you with excellent care. Hearing back from our patients is one way we can continue to improve our services. Please take a few minutes to complete the written survey that you may receive in the mail after your visit with us. Thank you!             Your Updated Medication List - Protect others around you: Learn how to safely use, store and throw away your medicines at www.disposemymeds.org.          This list is accurate as of: 4/10/17  8:53 AM.  Always use your most recent med list.                   Brand Name Dispense Instructions for use    ADVAIR DISKUS 250-50 MCG/DOSE diskus inhaler   Generic drug:  fluticasone-salmeterol      Inhale 1 puff into the lungs 2 times daily       * VENTOLIN HFA IN      Inhale 2 puffs into the lungs every 6 hours as needed       * albuterol (2.5 MG/3ML) 0.083% neb solution     360 mL    INHALE 1 VIAL VIA NEBULIZER EVERY 6 HOURS AS NEEDED       aspirin 81 MG EC tablet     90 tablet    Take 1 tablet (81 mg) by mouth daily       atorvastatin 40 MG tablet    LIPITOR    90 tablet    Take 1 tablet (40 mg) by mouth daily       blood glucose monitoring meter device kit     1  kit    Use to test blood sugars 3 times daily or as directed.       blood glucose monitoring test strip    ONE TOUCH ULTRA    3 Box    Use to test blood sugars 3 times daily or as directed.       calcium citrate-vitamin D 315-250 MG-UNIT Tabs per tablet    CITRACAL    120 tablet    Take 2 tablets by mouth daily       cetirizine 10 MG tablet    zyrTEC    90 tablet    Take 1 tablet (10 mg) by mouth daily as needed for allergies       clopidogrel 75 MG tablet    PLAVIX    90 tablet    Take 1 tablet (75 mg) by mouth daily       CYANOCOBALAMIN PO      Take 2,000 mcg by mouth daily       dorzolamide 2 % ophthalmic solution    TRUSOPT     Place 1 drop into both eyes 2 times daily       * EASY TOUCH LANCETS 30G/TWIST Misc          * thin lancets    NO BRAND SPECIFIED    1 Box    Use to test blood sugar 3 times daily or as directed.       * blood glucose monitoring lancets     3 Box    Use to test blood sugar 3 times daily or as directed.       EPINEPHrine 0.15 MG/0.3ML injection    EPIPEN JR    0.6 mL    Inject 0.3 mLs (0.15 mg) into the muscle as needed for anaphylaxis       escitalopram 20 MG tablet    LEXAPRO    90 tablet    One per day       estradiol 1 MG tablet    ESTRACE    90 tablet    Take 1 tablet (1 mg) by mouth daily       ferrous sulfate 325 (65 FE) MG tablet    IRON    60 tablet    Take 1 tablet (325 mg) by mouth 2 times daily With meals       fluticasone 50 MCG/ACT spray    FLONASE     Spray 1 spray into both nostrils daily       gabapentin 300 MG capsule    NEURONTIN    21 capsule    Take 1 capsule (300 mg) by mouth 2 times daily For 1 week then 1 capsule once daily for 1 week, then stop       hydrochlorothiazide 12.5 MG capsule    MICROZIDE    90 capsule    Take 1 capsule (12.5 mg) by mouth daily       HYDROmorphone 2 MG tablet    DILAUDID    30 tablet    Take 1 tablet (2 mg) by mouth every 3 hours as needed for moderate to severe pain       RENÉE Pack      Take 1 packet by mouth 2 times daily        latanoprost 0.005 % ophthalmic solution    XALATAN    2.5 mL    Place 1 drop into both eyes At Bedtime       levETIRAcetam 500 MG tablet    KEPPRA    180 tablet    Take 1 tablet (500 mg) by mouth 2 times daily       lidocaine 5 % Patch    LIDODERM    30 patch    Apply from 1 to 3 patches to painful area at once for up to 12 h within a 24 h period.  Remove after 12 hours.       lisinopril 10 MG tablet    PRINIVIL/ZESTRIL    90 tablet    Take 1 tablet (10 mg) by mouth daily       LYRICA 100 MG capsule   Generic drug:  pregabalin      Take 100 mg by mouth 2 times daily       MEDICATION GIVEN BY INTRATHECAL PUMP - INSTRUCTION      continuous 4/11/2016 per [facility] [place] [phone]: Conc: Bupivacaine 20 mg/mL and Fentanyl 2000 mcg/mL. Continuous: Bupivacaine 5.052 mg/day and Fentanyl 505.2 mcg/day. Boluses: Up to 7 boluses per 24-hr period of Bupivicaine 0.5992 mg and Fentanyl 59.9 mcg Max daily dose: Bupivacaine 9.1973 mg/day and Fentanyl 919.5883 mcg/day  Pump Last Fill Date:  6/30/2016 Pump Refill Date: 9/20/2016       metoprolol 25 MG 24 hr tablet    TOPROL-XL    30 tablet    Take 1 tablet (25 mg) by mouth daily       MULTIVITAMIN PO      Take 1 tablet by mouth daily       nitroglycerin 0.4 MG sublingual tablet    NITROSTAT    25 tablet    Place 1 tablet (0.4 mg) under the tongue every 5 minutes as needed for chest pain if you are still having symptoms after 3 doses (15 minutes) call 911.       ondansetron 4 MG ODT tab    ZOFRAN-ODT    120 tablet    Take 1 tablet (4 mg) by mouth every 6 hours       * order for DME     1 Month    Equipment being ordered: Depends briefs       * order for DME     1 Device    Equipment being ordered: Power Wheelchair       * order for DME     1 Units    Equipment being ordered: Nebulizer and supplies       * order for DME     1 Box    Equipment being ordered: Glucerna daily shakes with each meal       * order for DME     1 Device    ACcu check BID        * order for DME     1 Units    Equipment being ordered: Nebulizer and tubing supplies       * order for DME     1 Device    Equipment being ordered: CPAP supplies mask, hose, filters, cushion fax to [facility] at [phone] Disp: 10 DeviceRefills: prn Class: Local PrintStart: 2/10/2017       * order for DME     10 Device    Equipment being ordered: CPAP supplies mask, hose, filters, cushion  fax to [facility] at [phone]       * order for DME     1 Device    Equipment being ordered: wheelchair seat cushion       pantoprazole 40 MG EC tablet    PROTONIX     Take 40 mg by mouth daily       Phenytoin Sodium Extended 200 MG Caps     60 capsule    Take 200 mg by mouth 2 times daily       polyethylene glycol powder    MIRALAX/GLYCOLAX     Take 17 g by mouth daily       prochlorperazine 10 MG tablet    COMPAZINE    20 tablet    Take 1 tablet (10 mg) by mouth every 8 hours as needed       risperiDONE 0.5 MG tablet    risperDAL     Take 0.5 mg by mouth At Bedtime       rOPINIRole 0.25 MG tablet    REQUIP    9 tablet    Take 2 tablets (0.5 mg) by mouth At Bedtime For 3 days then 1 tablet (0.25mg) at bedtime for 3 days, then stop       SPIRIVA HANDIHALER 18 MCG capsule   Generic drug:  tiotropium     30 capsule    INHALE THE CONTENTS OF 1 CAPSULE VIA INHALATION DEVICE DAILY       sucralfate 1 GM tablet    CARAFATE    40 tablet    Take 1 tablet (1 g) by mouth 4 times daily May dissolve in 10 mL water is necessary. (Start upon completion of carafate suspension)       tamsulosin 0.4 MG capsule    FLOMAX    45 capsule    Take 1 capsule (0.4 mg) by mouth every other day       traZODone 100 MG tablet    DESYREL    90 tablet    Take 1 tablet (100 mg) by mouth At Bedtime       vitamin D 2000 UNITS tablet     100 tablet    Take 2,000 Units by mouth daily.       zinc 50 MG Tabs      Take 1 tablet by mouth daily       * Notice:  This list has 14 medication(s) that are the same as other medications prescribed for you.  Read the directions carefully, and ask your doctor or other care provider to review them with you.

## 2017-04-10 NOTE — NURSING NOTE
"Chief Complaint   Patient presents with     Fall       Initial /80  Pulse 87  Temp 97.4  F (36.3  C) (Oral)  Wt 133 lb (60.3 kg)  SpO2 93%  BMI 20.83 kg/m2 Estimated body mass index is 20.83 kg/(m^2) as calculated from the following:    Height as of 1/9/17: 5' 7\" (1.702 m).    Weight as of this encounter: 133 lb (60.3 kg).  Medication Reconciliation: complete    "

## 2017-04-12 ENCOUNTER — HOSPITAL ENCOUNTER (OUTPATIENT)
Dept: CT IMAGING | Facility: CLINIC | Age: 67
Discharge: HOME | End: 2017-04-12
Attending: INTERNAL MEDICINE | Admitting: INTERNAL MEDICINE
Payer: COMMERCIAL

## 2017-04-12 DIAGNOSIS — S09.90XA HEAD TRAUMA, INITIAL ENCOUNTER: ICD-10-CM

## 2017-04-12 PROCEDURE — 70450 CT HEAD/BRAIN W/O DYE: CPT

## 2017-04-13 ENCOUNTER — TELEPHONE (OUTPATIENT)
Dept: INTERNAL MEDICINE | Facility: CLINIC | Age: 67
End: 2017-04-13

## 2017-04-13 ENCOUNTER — CARE COORDINATION (OUTPATIENT)
Dept: GERIATRIC MEDICINE | Facility: CLINIC | Age: 67
End: 2017-04-13

## 2017-04-13 NOTE — TELEPHONE ENCOUNTER
Reason for Call:  Request for results:    Name of test or procedure: MRI of head    Date of test of procedure: 4/12/17    Location of the test or procedure: [facility]    OK to leave the result message on voice mail or with a family member? YES    Phone number Patient can be reached at:  Home number on file [phone] (home)    Additional comments: Pt has a lot of head pain.    Call taken on 4/13/2017 at 10:18 AM by DUKE FOSTER
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
## 2017-04-19 ENCOUNTER — TELEPHONE (OUTPATIENT)
Dept: INTERNAL MEDICINE | Facility: CLINIC | Age: 67
End: 2017-04-19

## 2017-04-19 ENCOUNTER — APPOINTMENT (OUTPATIENT)
Dept: ULTRASOUND IMAGING | Facility: CLINIC | Age: 67
End: 2017-04-19
Attending: EMERGENCY MEDICINE
Payer: COMMERCIAL

## 2017-04-19 ENCOUNTER — APPOINTMENT (OUTPATIENT)
Dept: GENERAL RADIOLOGY | Facility: CLINIC | Age: 67
End: 2017-04-19
Attending: EMERGENCY MEDICINE
Payer: COMMERCIAL

## 2017-04-19 ENCOUNTER — HOSPITAL ENCOUNTER (EMERGENCY)
Facility: CLINIC | Age: 67
Discharge: HOME | End: 2017-04-20
Attending: EMERGENCY MEDICINE | Admitting: EMERGENCY MEDICINE
Payer: COMMERCIAL

## 2017-04-19 ENCOUNTER — APPOINTMENT (OUTPATIENT)
Dept: CT IMAGING | Facility: CLINIC | Age: 67
End: 2017-04-19
Attending: EMERGENCY MEDICINE
Payer: COMMERCIAL

## 2017-04-19 DIAGNOSIS — K22.89 ESOPHAGEAL HEMORRHAGE: ICD-10-CM

## 2017-04-19 DIAGNOSIS — Z86.79 PERSONAL HISTORY OF CARDIOVASCULAR DISORDER: ICD-10-CM

## 2017-04-19 DIAGNOSIS — M79.605 LEFT LEG PAIN: ICD-10-CM

## 2017-04-19 DIAGNOSIS — T82.898A: ICD-10-CM

## 2017-04-19 DIAGNOSIS — R07.89 ATYPICAL CHEST PAIN: ICD-10-CM

## 2017-04-19 DIAGNOSIS — T80.818A INJECTION SITE EXTRAVASATION, INITIAL ENCOUNTER: ICD-10-CM

## 2017-04-19 DIAGNOSIS — R79.89 ELEVATED TROPONIN LEVEL: ICD-10-CM

## 2017-04-19 LAB
ALBUMIN UR-MCNC: NEGATIVE MG/DL
ANION GAP SERPL CALCULATED.3IONS-SCNC: 9 MMOL/L (ref 3–14)
APPEARANCE UR: CLEAR
BASOPHILS # BLD AUTO: 0 10E9/L (ref 0–0.2)
BASOPHILS NFR BLD AUTO: 0.3 %
BILIRUB UR QL STRIP: NEGATIVE
BUN SERPL-MCNC: 17 MG/DL (ref 7–30)
CALCIUM SERPL-MCNC: 8.6 MG/DL (ref 8.5–10.1)
CHLORIDE SERPL-SCNC: 108 MMOL/L (ref 94–109)
CO2 SERPL-SCNC: 25 MMOL/L (ref 20–32)
COLOR UR AUTO: ABNORMAL
CREAT SERPL-MCNC: 0.6 MG/DL (ref 0.52–1.04)
D DIMER PPP FEU-MCNC: 0.7 UG/ML FEU (ref 0–0.5)
DIFFERENTIAL METHOD BLD: NORMAL
EOSINOPHIL # BLD AUTO: 0.3 10E9/L (ref 0–0.7)
EOSINOPHIL NFR BLD AUTO: 3.2 %
ERYTHROCYTE [DISTWIDTH] IN BLOOD BY AUTOMATED COUNT: 13.3 % (ref 10–15)
GFR SERPL CREATININE-BSD FRML MDRD: NORMAL ML/MIN/1.7M2
GLUCOSE BLDC GLUCOMTR-MCNC: 81 MG/DL (ref 70–99)
GLUCOSE SERPL-MCNC: 81 MG/DL (ref 70–99)
GLUCOSE UR STRIP-MCNC: NEGATIVE MG/DL
HCT VFR BLD AUTO: 36.2 % (ref 35–47)
HGB BLD-MCNC: 12 G/DL (ref 11.7–15.7)
HGB UR QL STRIP: NEGATIVE
HYALINE CASTS #/AREA URNS LPF: 1 /LPF (ref 0–2)
IMM GRANULOCYTES # BLD: 0 10E9/L (ref 0–0.4)
IMM GRANULOCYTES NFR BLD: 0.3 %
KETONES UR STRIP-MCNC: NEGATIVE MG/DL
LEUKOCYTE ESTERASE UR QL STRIP: NEGATIVE
LYMPHOCYTES # BLD AUTO: 1.8 10E9/L (ref 0.8–5.3)
LYMPHOCYTES NFR BLD AUTO: 18.9 %
MCH RBC QN AUTO: 30.6 PG (ref 26.5–33)
MCHC RBC AUTO-ENTMCNC: 33.1 G/DL (ref 31.5–36.5)
MCV RBC AUTO: 92 FL (ref 78–100)
MONOCYTES # BLD AUTO: 0.8 10E9/L (ref 0–1.3)
MONOCYTES NFR BLD AUTO: 8.2 %
MUCOUS THREADS #/AREA URNS LPF: PRESENT /LPF
NEUTROPHILS # BLD AUTO: 6.6 10E9/L (ref 1.6–8.3)
NEUTROPHILS NFR BLD AUTO: 69.1 %
NITRATE UR QL: NEGATIVE
NRBC # BLD AUTO: 0 10*3/UL
NRBC BLD AUTO-RTO: 0 /100
PH UR STRIP: 6.5 PH (ref 5–7)
PLATELET # BLD AUTO: 277 10E9/L (ref 150–450)
POTASSIUM SERPL-SCNC: 3.5 MMOL/L (ref 3.4–5.3)
RBC # BLD AUTO: 3.92 10E12/L (ref 3.8–5.2)
RBC #/AREA URNS AUTO: 6 /HPF (ref 0–2)
SODIUM SERPL-SCNC: 142 MMOL/L (ref 133–144)
SP GR UR STRIP: 1.01 (ref 1–1.03)
SQUAMOUS #/AREA URNS AUTO: <1 /HPF (ref 0–1)
TROPONIN I SERPL-MCNC: NORMAL UG/L (ref 0–0.04)
TROPONIN I SERPL-MCNC: NORMAL UG/L (ref 0–0.04)
URN SPEC COLLECT METH UR: ABNORMAL
UROBILINOGEN UR STRIP-MCNC: NORMAL MG/DL (ref 0–2)
WBC # BLD AUTO: 9.5 10E9/L (ref 4–11)
WBC #/AREA URNS AUTO: 8 /HPF (ref 0–2)

## 2017-04-19 PROCEDURE — 93010 ELECTROCARDIOGRAM REPORT: CPT | Mod: Z6 | Performed by: EMERGENCY MEDICINE

## 2017-04-19 PROCEDURE — 84484 ASSAY OF TROPONIN QUANT: CPT | Performed by: EMERGENCY MEDICINE

## 2017-04-19 PROCEDURE — 71010 XR CHEST PORT 1 VW: CPT

## 2017-04-19 PROCEDURE — 93005 ELECTROCARDIOGRAM TRACING: CPT | Performed by: EMERGENCY MEDICINE

## 2017-04-19 PROCEDURE — 93971 EXTREMITY STUDY: CPT | Mod: LT

## 2017-04-19 PROCEDURE — 25000132 ZZH RX MED GY IP 250 OP 250 PS 637: Performed by: EMERGENCY MEDICINE

## 2017-04-19 PROCEDURE — 00000146 ZZHCL STATISTIC GLUCOSE BY METER IP

## 2017-04-19 PROCEDURE — 99285 EMERGENCY DEPT VISIT HI MDM: CPT | Mod: 25 | Performed by: EMERGENCY MEDICINE

## 2017-04-19 PROCEDURE — 87086 URINE CULTURE/COLONY COUNT: CPT | Performed by: EMERGENCY MEDICINE

## 2017-04-19 PROCEDURE — 96374 THER/PROPH/DIAG INJ IV PUSH: CPT | Mod: 59 | Performed by: EMERGENCY MEDICINE

## 2017-04-19 PROCEDURE — 25500064 ZZH RX 255 OP 636: Performed by: EMERGENCY MEDICINE

## 2017-04-19 PROCEDURE — 25000125 ZZHC RX 250: Performed by: EMERGENCY MEDICINE

## 2017-04-19 PROCEDURE — 85025 COMPLETE CBC W/AUTO DIFF WBC: CPT | Performed by: EMERGENCY MEDICINE

## 2017-04-19 PROCEDURE — 71260 CT THORAX DX C+: CPT

## 2017-04-19 PROCEDURE — 25000128 H RX IP 250 OP 636: Performed by: EMERGENCY MEDICINE

## 2017-04-19 PROCEDURE — 80048 BASIC METABOLIC PNL TOTAL CA: CPT | Performed by: EMERGENCY MEDICINE

## 2017-04-19 PROCEDURE — 85379 FIBRIN DEGRADATION QUANT: CPT

## 2017-04-19 PROCEDURE — 81001 URINALYSIS AUTO W/SCOPE: CPT | Performed by: EMERGENCY MEDICINE

## 2017-04-19 RX ORDER — HYDROMORPHONE HYDROCHLORIDE 2 MG/1
2 TABLET ORAL ONCE
Status: COMPLETED | OUTPATIENT
Start: 2017-04-19 | End: 2017-04-19

## 2017-04-19 RX ORDER — ONDANSETRON 2 MG/ML
8 INJECTION INTRAMUSCULAR; INTRAVENOUS ONCE
Status: COMPLETED | OUTPATIENT
Start: 2017-04-19 | End: 2017-04-19

## 2017-04-19 RX ORDER — IOPAMIDOL 755 MG/ML
53 INJECTION, SOLUTION INTRAVASCULAR ONCE
Status: COMPLETED | OUTPATIENT
Start: 2017-04-19 | End: 2017-04-19

## 2017-04-19 RX ADMIN — HYDROMORPHONE HYDROCHLORIDE 2 MG: 2 TABLET ORAL at 18:54

## 2017-04-19 RX ADMIN — IOPAMIDOL 53 ML: 755 INJECTION, SOLUTION INTRAVENOUS at 23:33

## 2017-04-19 RX ADMIN — HYDROMORPHONE HYDROCHLORIDE 2 MG: 2 TABLET ORAL at 21:39

## 2017-04-19 RX ADMIN — SODIUM CHLORIDE, PRESERVATIVE FREE 85 ML: 5 INJECTION INTRAVENOUS at 23:34

## 2017-04-19 RX ADMIN — ONDANSETRON 8 MG: 2 INJECTION INTRAMUSCULAR; INTRAVENOUS at 18:30

## 2017-04-19 ASSESSMENT — ENCOUNTER SYMPTOMS
APPETITE CHANGE: 1
ABDOMINAL PAIN: 0
NAUSEA: 1
FEVER: 0
COUGH: 1
VOMITING: 0
SHORTNESS OF BREATH: 1
DYSURIA: 1

## 2017-04-19 NOTE — ED PROVIDER NOTES
"  History     Chief Complaint   Patient presents with     Chest Pain     HPI  Sonya Foote is a 67 year old female with a complex medical history including COPD, PAD, chronic systolic CHF, left carotid stenosis, HTN, DM type II, GERD, CAD, ischemic cardiomyopathy, DVT, asthma, stroke, MI, and schizoaffective disorder  who presents to the Emergency Department for evaluation of chest pain. Last night the patient developed constant substernal chest pain described as \"someone sitting on [her]\" that waxes and wanes in intensity with associated shortness of breath. She took NTG last night with relief of her pain and today without improvement. The patient also took Tylenol without relief. She notes her symptoms are consistent with past heart disease. Additionally, the patient reports a cough for the past couple of days productive of clear sputum. Her chest pain is exacerbated with the cough. Lastly, the patient reports dysuria, decreased appetite and left leg pain. She denies worsening nausea, vomiting, fever, abdominal pain or any other concerns or complaints at this time.    Past Medical History:   Diagnosis Date     Anemia      Antiplatelet or antithrombotic long-term use      Arthritis      AS (sickle cell trait) (H) 10/8/2013     Blind left eye      BPPV (benign paroxysmal positional vertigo)      Chronic back pain      COPD (chronic obstructive pulmonary disease) (H)      Coronary artery disease      CVA (cerebral infarction) 10/8/2012    x3, residual left sided weakness     Diastolic CHF (H) 9/4/2012     Dilantin toxicity 5/31/2013     Esophageal candidiasis (H)      GERD (gastroesophageal reflux disease)      Heart attack (H)      Hernia, abdominal      History of blood transfusion     x5     History of thrombophlebitis      HTN (hypertension) 9/4/2012     Hyperlipidemia LDL goal <100 9/4/2012     Legally blind in right eye, as defined in [place]     No periphal vision right eye     Osteoporosis 1/21/2013     Other " chronic pain      PAD (peripheral artery disease) (H)      Palpitations      Person who has had sex change operation 1/20/2014     Pneumonia      Schizoaffective disorder (H)      Seizure disorder (H) 9/4/2012     Seizures (H)      Sleep apnea     CPAP     Thrombosis of leg      Type 2 diabetes, HbA1C goal < 8% (H) 9/4/2012     Uncomplicated asthma      Unspecified cerebral artery occlusion with cerebral infarction        Past Surgical History:   Procedure Laterality Date     AMPUTATE LEG ABOVE KNEE Left 6/11/2016    Procedure: AMPUTATE LEG ABOVE KNEE;  Surgeon: Mello Rodriguez MD;  [facility]     AMPUTATE LEG BELOW KNEE Right 11/7/2016    Procedure: AMPUTATE LEG BELOW KNEE;  Surgeon: Savannah Durant MD;  [facility]     AMPUTATE REVISION STUMP LOWER EXTREMITY Right 11/11/2016    Procedure: AMPUTATE REVISION STUMP LOWER EXTREMITY;  Surgeon: Savannah Durant MD;  [facility]     AMPUTATE REVISION STUMP LOWER EXTREMITY Right 11/16/2016    Procedure: AMPUTATE REVISION STUMP LOWER EXTREMITY;  Surgeon: Savannah Durant MD;  [facility]     AMPUTATE TOE(S) Right 1/5/2016    Procedure: AMPUTATE TOE(S);  Surgeon: Mello Gaines DPM;  Location: [facility]     ANGIOGRAM Bilateral 11/21/2014    Procedure: ANGIOGRAM;  Surgeon: Savannah Durant MD;  [facility]     ANGIOGRAM Left 1/16/2015    Procedure: ANGIOGRAM;  Surgeon: Savannah Durant MD;  [facility]     ANGIOGRAM Bilateral 9/14/2015    Procedure: ANGIOGRAM;  Surgeon: Savannah Durant MD;  [facility]     ANGIOGRAM Left 10/12/2015    Procedure: ANGIOGRAM;  Surgeon: Savannah Durant MD;  [facility]     ANGIOGRAM Right 6/6/2016    Procedure: ANGIOGRAM;  Surgeon: Savannah Durant MD;  [facility]     ANGIOPLASTY Right 6/6/2016    Procedure: ANGIOPLASTY;  Surgeon: Savannah Durant MD;  [facility]     APPENDECTOMY       BREAST SURGERY      right breast bx (benign)     CHOLECYSTECTOMY       COLONOSCOPY N/A 8/25/2014    Procedure: COLONOSCOPY;  Surgeon:  Mello Ferrer MD;  [facility]     COLONOSCOPY WITH CO2 INSUFFLATION N/A 8/20/2014    Procedure: COLONOSCOPY WITH CO2 INSUFFLATION;  Surgeon: Duane, William Charles, MD;  Location: [facility]     ENDARTERECTOMY FEMORAL  5/23/2014    Procedure: ENDARTERECTOMY FEMORAL;  Surgeon: Jason Joshi MD;  [facility]     ESOPHAGOSCOPY, GASTROSCOPY, DUODENOSCOPY (EGD), COMBINED  12/14/2012    Procedure: COMBINED ESOPHAGOSCOPY, GASTROSCOPY, DUODENOSCOPY (EGD), BIOPSY SINGLE OR MULTIPLE;  ESOPHAGOSCOPY, GASTROSCOPY, DUODENOSCOPY (EGD), DILATATION ;  Surgeon: Elizabeth Stevenson MD;  Location:  GI     ESOPHAGOSCOPY, GASTROSCOPY, DUODENOSCOPY (EGD), COMBINED  12/31/2013    Procedure: COMBINED ESOPHAGOSCOPY, GASTROSCOPY, DUODENOSCOPY (EGD), BIOPSY SINGLE OR MULTIPLE;;  Surgeon: Clemente Lopez MD;  [facility]     ESOPHAGOSCOPY, GASTROSCOPY, DUODENOSCOPY (EGD), COMBINED  4/1/2014    Procedure: COMBINED ESOPHAGOSCOPY, GASTROSCOPY, DUODENOSCOPY (EGD);;  Surgeon: Clemente Lopez MD;  [facility]     ESOPHAGOSCOPY, GASTROSCOPY, DUODENOSCOPY (EGD), COMBINED  6/28/2014    Procedure: COMBINED ESOPHAGOSCOPY, GASTROSCOPY, DUODENOSCOPY (EGD);  Surgeon: Clemente Lopez MD;  [facility]     ESOPHAGOSCOPY, GASTROSCOPY, DUODENOSCOPY (EGD), COMBINED N/A 8/20/2014    Procedure: COMBINED ESOPHAGOSCOPY, GASTROSCOPY, DUODENOSCOPY (EGD), BIOPSY SINGLE OR MULTIPLE;  Surgeon: Duane, William Charles, MD;  Location:  OR     ESOPHAGOSCOPY, GASTROSCOPY, DUODENOSCOPY (EGD), COMBINED N/A 8/22/2014    Procedure: COMBINED ESOPHAGOSCOPY, GASTROSCOPY, DUODENOSCOPY (EGD), BIOPSY SINGLE OR MULTIPLE;  Surgeon: Mello Ferrer MD;  [facility]     ESOPHAGOSCOPY, GASTROSCOPY, DUODENOSCOPY (EGD), COMBINED N/A 10/2/2014    Procedure: COMBINED ESOPHAGOSCOPY, GASTROSCOPY, DUODENOSCOPY (EGD), BIOPSY SINGLE OR MULTIPLE;  Surgeon: Remy Haskins MD;  Location:  GI     ESOPHAGOSCOPY, GASTROSCOPY, DUODENOSCOPY (EGD), COMBINED Left  12/15/2014    Procedure: COMBINED ESOPHAGOSCOPY, GASTROSCOPY, DUODENOSCOPY (EGD), BIOPSY SINGLE OR MULTIPLE;  Surgeon: Remy Haskins MD;  [facility]     ESOPHAGOSCOPY, GASTROSCOPY, DUODENOSCOPY (EGD), COMBINED N/A 2/25/2015    Procedure: COMBINED ENDOSCOPIC ULTRASOUND, ESOPHAGOSCOPY, GASTROSCOPY, DUODENOSCOPY (EGD), FINE NEEDLE ASPIRATE/BIOPSY;  Surgeon: Clemente Lugo MD;  [facility]     ESOPHAGOSCOPY, GASTROSCOPY, DUODENOSCOPY (EGD), COMBINED Left 2/25/2015    Procedure: COMBINED ESOPHAGOSCOPY, GASTROSCOPY, DUODENOSCOPY (EGD), BIOPSY SINGLE OR MULTIPLE;  Surgeon: Clemente Lugo MD;  [facility]     ESOPHAGOSCOPY, GASTROSCOPY, DUODENOSCOPY (EGD), COMBINED N/A 9/25/2016    Procedure: COMBINED ESOPHAGOSCOPY, GASTROSCOPY, DUODENOSCOPY (EGD);  Surgeon: Aziza Patiño MD;  [facility]     ESOPHAGOSCOPY, GASTROSCOPY, DUODENOSCOPY (EGD), COMBINED N/A 1/18/2017    Procedure: COMBINED ESOPHAGOSCOPY, GASTROSCOPY, DUODENOSCOPY (EGD), BIOPSY SINGLE OR MULTIPLE;  Surgeon: Clemente Lopez MD;  [facility]     FASCIOTOMY LOWER EXTREMITY Left 6/10/2016    Procedure: FASCIOTOMY LOWER EXTREMITY;  Surgeon: Mello Rodriguez MD;  [facility]     HC CAPSULE ENDOSCOPY N/A 8/25/2014    Procedure: CAPSULE/PILL CAM ENDOSCOPY;  Surgeon: Remy Haskins MD;  [facility]     HC CAPSULE ENDOSCOPY N/A 10/2/2014    Procedure: CAPSULE/PILL CAM ENDOSCOPY;  Surgeon: Remy Haskins MD;  [facility]     ORTHOPEDIC SURGERY      broken wrist repair     SEX TRANSFORMATION SURGERY, MALE TO FEMALE      1974     SINUS SURGERY      cyst removed     TONSILLECTOMY       VASCULAR SURGERY      Left carotid stent       Family History   Problem Relation Age of Onset     Dementia Mother      Glaucoma Mother      DIABETES Mother      Coronary Artery Disease Mother      MI     Glaucoma Father      DIABETES Father      Heart Failure Father      CANCER Maternal Aunt      leukemia     Schizophrenia  Brother      Depression Brother      Suicide Sister      Depression Sister      DIABETES Sister      CANCER Maternal Aunt      ovarian     Glaucoma Maternal Grandmother      DIABETES Maternal Grandmother      Glaucoma Maternal Grandfather      DIABETES Maternal Grandfather      Glaucoma Paternal Grandmother      DIABETES Paternal Grandmother      Glaucoma Paternal Grandfather      DIABETES Paternal Grandfather      Breast Cancer Sister      CEREBROVASCULAR DISEASE Brother        Social History   Substance Use Topics     Smoking status: Former Smoker     Packs/day: 2.50     Years: 30.00     Types: Cigarettes, Cigars     Quit date: 11/1/2000     Smokeless tobacco: Never Used     Alcohol use No       No current facility-administered medications for this encounter.      Current Outpatient Prescriptions   Medication     albuterol (VENTOLIN HFA) 108 (90 BASE) MCG/ACT Inhaler     diclofenac (VOLTAREN) 1 % GEL topical gel     EPINEPHrine (EPIPEN JR) 0.15 MG/0.3ML injection     gabapentin (NEURONTIN) 300 MG capsule     rOPINIRole (REQUIP) 0.25 MG tablet     tamsulosin (FLOMAX) 0.4 MG capsule     cetirizine (ZYRTEC) 10 MG tablet     lisinopril (PRINIVIL/ZESTRIL) 10 MG tablet     pantoprazole (PROTONIX) 40 MG EC tablet     pregabalin (LYRICA) 100 MG capsule     risperiDONE (RISPERDAL) 0.5 MG tablet     ferrous sulfate (IRON) 325 (65 FE) MG tablet     lidocaine (LIDODERM) 5 % Patch     blood glucose monitoring (ONE TOUCH ULTRA) test strip     order for DME     order for DME     sucralfate (CARAFATE) 1 GM tablet     order for DME     order for DME     albuterol (2.5 MG/3ML) 0.083% neb solution     order for DME     ondansetron (ZOFRAN-ODT) 4 MG ODT tab     metoprolol (TOPROL-XL) 25 MG 24 hr tablet     CYANOCOBALAMIN PO     Nutritional Supplements (RENÉE) PACK     polyethylene glycol (MIRALAX/GLYCOLAX) powder     HYDROmorphone (DILAUDID) 2 MG tablet     fluticasone (FLONASE) 50 MCG/ACT nasal spray     ADVAIR DISKUS 250-50  MCG/DOSE diskus inhaler     calcium citrate-vitamin D (CITRACAL) 315-250 MG-UNIT TABS     hydrochlorothiazide (MICROZIDE) 12.5 MG capsule     escitalopram (LEXAPRO) 20 MG tablet     estradiol (ESTRACE) 1 MG tablet     levETIRAcetam (KEPPRA) 500 MG tablet     traZODone (DESYREL) 100 MG tablet     aspirin EC 81 MG EC tablet     clopidogrel (PLAVIX) 75 MG tablet     blood glucose monitoring (ONE TOUCH ULTRA 2) meter device kit     blood glucose monitoring (ONE TOUCH ULTRASOFT) lancets     phenytoin 200 MG CAPS     dorzolamide (TRUSOPT) 2 % ophthalmic solution     SPIRIVA HANDIHALER 18 MCG inhalation capsule     zinc 50 MG TABS     nitroglycerin (NITROSTAT) 0.4 MG SL tablet     MEDICATION GIVEN BY INTRATHECAL PUMP - INSTRUCTION     latanoprost (XALATAN) 0.005 % ophthalmic solution     atorvastatin (LIPITOR) 40 MG tablet     order for DME     prochlorperazine (COMPAZINE) 10 MG tablet     thin (NO BRAND SPECIFIED) lancets     ORDER FOR DME     ORDER FOR DME     ORDER FOR DME     EASY TOUCH LANCETS 30G/TWIST MISC     Cholecalciferol (VITAMIN D) 2000 UNITS tablet     Multiple Vitamin (MULTIVITAMIN OR)     Facility-Administered Medications Ordered in Other Encounters   Medication     neostigmine (PROSTIGMINE) injection     glycopyrrolate (ROBINUL) injection        Allergies   Allergen Reactions     Bee Venom      Penicillins Anaphylaxis     Other reaction(s): Skin Rash and/or Hives     Dilantin [Phenytoin] Other (See Comments)     Generic dilantin only per pt     Iodide Hives     09/11/15: Pt states she can use iodine and doesn't have any problems      Iodine-131      Novocaine [Procaine] Hives     Other reaction(s): Skin Rash and/or Hives     I have reviewed the Medications, Allergies, Past Medical and Surgical History, and Social History in the Epic system.    Review of Systems   Constitutional: Positive for appetite change (decreased). Negative for fever.   Respiratory: Positive for cough and shortness of breath.          Positive for sputum production.   Cardiovascular: Positive for chest pain (substernal).   Gastrointestinal: Positive for nausea (chronic, not worse). Negative for abdominal pain and vomiting.   Genitourinary: Positive for dysuria.   Musculoskeletal:        Positive for left leg pain.   All other systems reviewed and are negative.      Physical Exam   BP: 154/76  Heart Rate: 62  Temp: 97.2  F (36.2  C)  Resp: 16  Weight: 60.3 kg (133 lb)  SpO2: 100 %  Physical Exam   Constitutional: No distress.   HENT:   Head: Atraumatic.   Mouth/Throat: Oropharynx is clear and moist. No oropharyngeal exudate.   Eyes: Pupils are equal, round, and reactive to light. No scleral icterus.   Cardiovascular: Normal heart sounds and intact distal pulses.    Pulmonary/Chest: Breath sounds normal. No respiratory distress. She exhibits tenderness.       Abdominal: Soft. There is no tenderness.   Musculoskeletal: She exhibits tenderness. She exhibits no edema.        Legs:  Skin: Skin is warm. No rash noted. She is not diaphoretic.       ED Course     5:42 PM  The patient was seen and examined by Dr. Horan in Room 23.     ED Course     Procedures             EKG Interpretation:      Interpreted by Hannah Horan  Time reviewed: 1745  Symptoms at time of EKG: chest pain   Rhythm: normal sinus   Rate: 63  Axis: Normal  Ectopy: none  Conduction: normal  ST Segments/ T Waves: No ST-T wave changes  Q Waves: III, V1  Comparison to prior: no acute abnormality    Clinical Impression: NSR without acute ischemic change                Critical Care time:  none               Labs Ordered and Resulted from Time of ED Arrival Up to the Time of Departure from the ED   ROUTINE UA WITH MICROSCOPIC - Abnormal; Notable for the following:        Result Value    WBC Urine 8 (*)     RBC Urine 6 (*)     Mucous Urine Present (*)     All other components within normal limits   D DIMER QUANTITATIVE - Abnormal; Notable for the following:     D Dimer 0.7 (*)      All other components within normal limits   CBC WITH PLATELETS DIFFERENTIAL   TROPONIN I   BASIC METABOLIC PANEL   GLUCOSE BY METER   TROPONIN I       Assessments & Plan (with Medical Decision Making)   The patient does have significant cardiac history, so certainly I was concerned about the possibility of unstable angina/ACS.  EKG was done, which did not show any acute abnormality suggestive of acute ischemia.  Troponin was checked and was negative.  She s had constant pain for more than 24 hours.  This is certainly reassuring.  Chest x-ray was not remarkable.  Urinalysis shows negative nitrite, negative LE, though 8 WBCs.  UC was added on.  CBC Is Unremarkable.  I did order a d-dimer, which was elevated at 0.7.  Left lower extremity ultrasound was done given the left thigh pain, which is negative for DVT.  The stump is warm, without erythema or obvious sign of infection.  The patient reported an iodine allergy but has had several CTs with contrast in the past, she tells me she tolerated these without any issues.  She was given a dye bolus for chest CT, which came back showing no pulmonary embolus, though moderate diffuse wall thickening of the entire esophagus concerning for esophagitis.  The radiologist did call me and tell me that the IV extravasated at the end of the injection, likely just saline.  She was given an ice pack.  I did go back and reevaluated her several times, the area of swelling in her right forearm seems to be coming down.  The area does not seem tense.  She is able to move her fingers without issue.  CNS is intact.  I did review her chart as she tells me she is recently had an EGD.  It does note that she s had multiple EGDs which have all shown signs of severe esophagitis, all at various stages of healing.  Per her med sheet she is currently on Pantoprazole as well as Carafate.  It s unclear whether the esophageal thickening seen represents active esophagitis versus scarring.  It s unclear  whether this is contributing to her chest discomfort.  She currently states her chest discomfort has improved.  However, she was given a dose of oral Dilaudid here as she does take this at home.  Did check a 2nd troponin and this is negative.     Patient will be signed out at this time to the oncoming doctor pending a repeat evaluation of her forearm. Of this continues to look unconcerning within the next half hour to one hour, I do think  she is safe for discharge home. Patient has been requesting discharge.  I do recommend she follow up with her primary care doctor within the next couple days, return to the ER with any worsening or concerns.  I think the likelihood of acute coronary syndrome at this point is low given the ongoing negative troponins, and again there is nothing to suggest PE at this time.  She should further distress this with her clinic doctor any need for consideration for further treatment of possible esophagitis.    I have reviewed the nursing notes.  I have reviewed the findings, diagnosis, plan and need for follow up with the patient.  Discharge Medication List as of 4/20/2017  2:46 AM          Final diagnoses:   Atypical chest pain   Extravasation injury of IV catheter site with other complication, initial encounter     IDaniela, am serving as a trained medical scribe to document services personally performed by Hannah Horan MD, based on the provider's statements to me.      Hannah MONREAL MD, was physically present and have reviewed and verified the accuracy of this note documented by Daniela Redmond.     4/19/2017   [facility], EMERGENCY DEPARTMENT     Hannah Horan MD  04/25/17 [id number]

## 2017-04-19 NOTE — ED AVS SNAPSHOT
[facility], [place], Emergency Department    [address] [place] [id number]    Phone:  [phone]                                       Sonya Foote   MRN: [id number]    Department:  [facility], Emergency Department   Date of Visit:  4/19/2017           After Visit Summary Signature Page     I have received my discharge instructions, and my questions have been answered. I have discussed any challenges I see with this plan with the nurse or doctor.    ..........................................................................................................................................  Patient/Patient Representative Signature      ..........................................................................................................................................  Patient Representative Print Name and Relationship to Patient    ..................................................               ................................................  Date                                            Time    ..........................................................................................................................................  Reviewed by Signature/Title    ...................................................              ..............................................  Date                                                            Time

## 2017-04-19 NOTE — ED NOTES
Bed: ED23  Expected date: 4/19/17  Expected time: 3:50 PM  Means of arrival: Ambulance  Comments:  [address]  64 y F  Chest Pain  Yellow

## 2017-04-19 NOTE — ED NOTES
Alert orientated wheelchair bound patient presents to ER via EMS with c/o:    1.) Chest pain    Hx:  Onset two days ago.  Patient took nitro last night with some relief.  Today patient took nitro x 3 without relief.  EMS called.  EMS gave nitro x 3 (for a total of 6 pre-hospital).  No pain relief.  EMS gave 50mcg fentanyl via IV with some relief.  Patient received 250ml of NS en route via EMS.  Pain present upon arrival.      Airway WDLs  Breathing WDLs  Circulation WDLs

## 2017-04-19 NOTE — TELEPHONE ENCOUNTER
Prior authorization    Medication name diclofenac  Insurance medicare blue  Insurance ID number [id number]  Prior authorization faxed through cover my meds

## 2017-04-19 NOTE — ED AVS SNAPSHOT
[facility], Emergency Department    [address] [facility] [address]    Phone:  [phone]                                       Sonya Foote   MRN: [id number]    Department:  [facility], Emergency Department   Date of Visit:  4/19/2017           Patient Information     Date Of Birth          1950        Your diagnoses for this visit were:     Atypical chest pain     Extravasation injury of IV catheter site with other complication, initial encounter        You were seen by Jason Schulte MD, Hannah Horan MD, and Chet Verdin MD.        Discharge Instructions          *CHEST PAIN, UNCERTAIN CAUSE    Based on your exam today, the exact cause of your chest pain is not certain. Your condition does not seem serious at this time, and your pain does not appear to be coming from your heart. However, sometimes the signs of a serious problem take more time to appear. Therefore, watch for the warning signs listed below.  HOME CARE:  1. Rest today and avoid strenuous activity.  2. Take any prescribed medicine as directed.  FOLLOW UP with your doctor in 1-3 days.   GET PROMPT MEDICAL ATTENTION if any of the following occur:    A change in the type of pain: if it feels different, becomes more severe, lasts longer, or begins to spread into your shoulder, arm, neck, jaw or back    Shortness of breath or increased pain with breathing    Weakness, dizziness, or fainting    Cough with blood or dark colored sputum (phlegm)    Fever over 101  F (38.3  C)    Swelling, pain or redness in one leg    [id number] [place] [address]. All rights reserved. This information is not intended as a substitute for professional medical care. Always follow your healthcare professional's instructions.      Please make an appointment to follow up with Your Primary Care Provider in 1-2 days. Return to the ER with any worsening or concerns.       Future Appointments        Provider  Department Dept Phone Center    4/25/2017 3:00 PM MD [facility] [phone] [facility]    4/26/2017 8:40 AM Radha Aldrich MD [facility] [phone] [facility]    5/8/2017 1:00 PM  NURSE [facility] [place] [phone] [facility]    5/12/2017 10:50 AM Michelle Irizarry MD [facility] Endocrinology [phone] [facility]    5/18/2017 1:00 PM Elizabeth Oliver MD [facility] [phone] [facility]    7/10/2017 11:00 AM Alina Jennings MD [facility] [phone] [facility]      24 Hour Appointment Hotline       To make an appointment at any [facility], call [phone] ([phone]). If you don't have a family doctor or clinic, we will help you find one. [facility] are conveniently located to serve the needs of you and your family.             Review of your medicines      Our records show that you are taking the medicines listed below. If these are incorrect, please call your family doctor or clinic.        Dose / Directions Last dose taken    ADVAIR DISKUS 250-50 MCG/DOSE diskus inhaler   Dose:  1 puff   Generic drug:  fluticasone-salmeterol        Inhale 1 puff into the lungs 2 times daily   Refills:  0        * albuterol (2.5 MG/3ML) 0.083% neb solution   Quantity:  360 mL        INHALE 1 VIAL VIA NEBULIZER EVERY 6 HOURS AS NEEDED   Refills:  11        * albuterol 108 (90 BASE) MCG/ACT Inhaler   Commonly known as:  VENTOLIN HFA   Dose:  1-2 puff   Quantity:  1 Inhaler        Inhale 1-2 puffs into the lungs every 6 hours as needed   Refills:  11        aspirin 81 MG EC tablet   Dose:  81 mg   Quantity:  90 tablet        Take 1 tablet (81 mg) by mouth daily   Refills:  3        atorvastatin 40 MG tablet   Commonly known as:  LIPITOR   Dose:  40 mg   Quantity:  90 tablet        Take 1 tablet (40 mg) by mouth daily   Refills:  3         blood glucose monitoring meter device kit   Quantity:  1 kit        Use to test blood sugars 3 times daily or as directed.   Refills:  0        blood glucose monitoring test strip   Commonly known as:  ONE TOUCH ULTRA   Quantity:  3 Box        Use to test blood sugars 3 times daily or as directed.   Refills:  3        calcium citrate-vitamin D 315-250 MG-UNIT Tabs per tablet   Commonly known as:  CITRACAL   Dose:  2 tablet   Quantity:  120 tablet        Take 2 tablets by mouth daily   Refills:  5        cetirizine 10 MG tablet   Commonly known as:  zyrTEC   Dose:  10 mg   Quantity:  90 tablet        Take 1 tablet (10 mg) by mouth daily as needed for allergies   Refills:  1        clopidogrel 75 MG tablet   Commonly known as:  PLAVIX   Dose:  75 mg   Quantity:  90 tablet        Take 1 tablet (75 mg) by mouth daily   Refills:  3        CYANOCOBALAMIN PO   Dose:  2000 mcg        Take 2,000 mcg by mouth daily   Refills:  0        diclofenac 1 % Gel topical gel   Commonly known as:  VOLTAREN   Dose:  2 g   Quantity:  100 g        Apply 2 g topically 4 times daily to hands   Refills:  11        dorzolamide 2 % ophthalmic solution   Commonly known as:  TRUSOPT   Dose:  1 drop        Place 1 drop into both eyes 2 times daily   Refills:  0        * EASY TOUCH LANCETS 30G/TWIST Misc        Refills:  0        * thin lancets   Commonly known as:  NO BRAND SPECIFIED   Quantity:  1 Box        Use to test blood sugar 3 times daily or as directed.   Refills:  10        * blood glucose monitoring lancets   Quantity:  3 Box        Use to test blood sugar 3 times daily or as directed.   Refills:  3        EPINEPHrine 0.15 MG/0.3ML injection   Commonly known as:  EPIPEN JR   Dose:  0.15 mg   Quantity:  0.6 mL        Inject 0.3 mLs (0.15 mg) into the muscle as needed for anaphylaxis   Refills:  1        escitalopram 20 MG tablet   Commonly known as:  LEXAPRO   Quantity:  90 tablet        One per day   Refills:  3        estradiol 1 MG  tablet   Commonly known as:  ESTRACE   Dose:  1 mg   Quantity:  90 tablet        Take 1 tablet (1 mg) by mouth daily   Refills:  3        ferrous sulfate 325 (65 FE) MG tablet   Commonly known as:  IRON   Dose:  325 mg   Quantity:  60 tablet        Take 1 tablet (325 mg) by mouth 2 times daily With meals   Refills:  2        fluticasone 50 MCG/ACT spray   Commonly known as:  FLONASE   Dose:  1 spray        Spray 1 spray into both nostrils daily   Refills:  0        gabapentin 300 MG capsule   Commonly known as:  NEURONTIN   Dose:  300 mg   Quantity:  21 capsule        Take 1 capsule (300 mg) by mouth 2 times daily For 1 week then 1 capsule once daily for 1 week, then stop   Refills:  0        hydrochlorothiazide 12.5 MG capsule   Commonly known as:  MICROZIDE   Dose:  12.5 mg   Quantity:  90 capsule        Take 1 capsule (12.5 mg) by mouth daily   Refills:  3        HYDROmorphone 2 MG tablet   Commonly known as:  DILAUDID   Dose:  2 mg   Quantity:  30 tablet        Take 1 tablet (2 mg) by mouth every 3 hours as needed for moderate to severe pain   Refills:  0        RENÉE Pack   Dose:  1 packet        Take 1 packet by mouth 2 times daily   Refills:  0        latanoprost 0.005 % ophthalmic solution   Commonly known as:  XALATAN   Dose:  1 drop   Quantity:  2.5 mL        Place 1 drop into both eyes At Bedtime   Refills:  11        levETIRAcetam 500 MG tablet   Commonly known as:  KEPPRA   Dose:  500 mg   Quantity:  180 tablet        Take 1 tablet (500 mg) by mouth 2 times daily   Refills:  1        lidocaine 5 % Patch   Commonly known as:  LIDODERM   Quantity:  30 patch        Apply from 1 to 3 patches to painful area at once for up to 12 h within a 24 h period.  Remove after 12 hours.   Refills:  1        lisinopril 10 MG tablet   Commonly known as:  PRINIVIL/ZESTRIL   Dose:  10 mg   Quantity:  90 tablet        Take 1 tablet (10 mg) by mouth daily   Refills:  3        LYRICA 100 MG capsule   Dose:  100 mg   Generic  drug:  pregabalin        Take 100 mg by mouth 2 times daily   Refills:  0        MEDICATION GIVEN BY INTRATHECAL PUMP - INSTRUCTION        continuous 4/11/2016 per [facility] in [place] [phone]: Conc: Bupivacaine 20 mg/mL and Fentanyl 2000 mcg/mL. Continuous: Bupivacaine 5.052 mg/day and Fentanyl 505.2 mcg/day. Boluses: Up to 7 boluses per 24-hr period of Bupivicaine 0.5992 mg and Fentanyl 59.9 mcg Max daily dose: Bupivacaine 9.1973 mg/day and Fentanyl 919.5883 mcg/day  Pump Last Fill Date:  6/30/2016 Pump Refill Date: 9/20/2016   Refills:  0        metoprolol 25 MG 24 hr tablet   Commonly known as:  TOPROL-XL   Dose:  25 mg   Quantity:  30 tablet        Take 1 tablet (25 mg) by mouth daily   Refills:  0        MULTIVITAMIN PO   Dose:  1 tablet        Take 1 tablet by mouth daily   Refills:  0        nitroglycerin 0.4 MG sublingual tablet   Commonly known as:  NITROSTAT   Dose:  0.4 mg   Quantity:  25 tablet        Place 1 tablet (0.4 mg) under the tongue every 5 minutes as needed for chest pain if you are still having symptoms after 3 doses (15 minutes) call 911.   Refills:  1        ondansetron 4 MG ODT tab   Commonly known as:  ZOFRAN-ODT   Dose:  4 mg   Quantity:  120 tablet        Take 1 tablet (4 mg) by mouth every 6 hours   Refills:  0        * order for DME   Quantity:  1 Month        Equipment being ordered: Depends briefs   Refills:  11        * order for DME   Quantity:  1 Device        Equipment being ordered: Power Wheelchair   Refills:  0        * order for DME   Quantity:  1 Units        Equipment being ordered: Nebulizer and supplies   Refills:  0        * order for DME   Quantity:  1 Box        Equipment being ordered: Glucerna daily shakes with each meal   Refills:  11        * order for DME   Quantity:  1 Device        ACcu check BID   Refills:  0        * order for DME   Quantity:  1 Units        Equipment being ordered: Nebulizer and tubing supplies   Refills:   0        * order for DME   Quantity:  1 Device        Equipment being ordered: CPAP supplies mask, hose, filters, cushion fax to [facility] at [phone] Disp: 10 DeviceRefills: prn Class: Local PrintStart: 2/10/2017   Refills:  0        * order for DME   Quantity:  10 Device        Equipment being ordered: CPAP supplies mask, hose, filters, cushion  fax to [facility] at [phone]   Refills:  prn        * order for DME   Quantity:  1 Device        Equipment being ordered: wheelchair seat cushion   Refills:  0        pantoprazole 40 MG EC tablet   Commonly known as:  PROTONIX   Dose:  40 mg        Take 40 mg by mouth daily   Refills:  0        Phenytoin Sodium Extended 200 MG Caps   Dose:  200 mg   Quantity:  60 capsule        Take 200 mg by mouth 2 times daily   Refills:  0        polyethylene glycol powder   Commonly known as:  MIRALAX/GLYCOLAX   Dose:  17 g   Indication:  Constipation        Take 17 g by mouth daily   Refills:  0        prochlorperazine 10 MG tablet   Commonly known as:  COMPAZINE   Dose:  10 mg   Quantity:  20 tablet        Take 1 tablet (10 mg) by mouth every 8 hours as needed   Refills:  0        risperiDONE 0.5 MG tablet   Commonly known as:  risperDAL   Dose:  0.5 mg        Take 0.5 mg by mouth At Bedtime   Refills:  0        rOPINIRole 0.25 MG tablet   Commonly known as:  REQUIP   Dose:  0.5 mg   Quantity:  9 tablet        Take 2 tablets (0.5 mg) by mouth At Bedtime For 3 days then 1 tablet (0.25mg) at bedtime for 3 days, then stop   Refills:  0        SPIRIVA HANDIHALER 18 MCG capsule   Quantity:  30 capsule   Generic drug:  tiotropium        INHALE THE CONTENTS OF 1 CAPSULE VIA INHALATION DEVICE DAILY   Refills:  2        sucralfate 1 GM tablet   Commonly known as:  CARAFATE   Dose:  1 g   Quantity:  40 tablet        Take 1 tablet (1 g) by mouth 4 times daily May dissolve in 10 mL water is necessary. (Start upon completion of carafate suspension)   Refills:  5         tamsulosin 0.4 MG capsule   Commonly known as:  FLOMAX   Dose:  0.4 mg   Quantity:  45 capsule        Take 1 capsule (0.4 mg) by mouth every other day   Refills:  1        traZODone 100 MG tablet   Commonly known as:  DESYREL   Dose:  100 mg   Quantity:  90 tablet        Take 1 tablet (100 mg) by mouth At Bedtime   Refills:  3        vitamin D 2000 UNITS tablet   Dose:  2000 Units   Quantity:  100 tablet        Take 2,000 Units by mouth daily.   Refills:  3        zinc 50 MG Tabs   Dose:  1 tablet        Take 1 tablet by mouth daily   Refills:  0        * Notice:  This list has 14 medication(s) that are the same as other medications prescribed for you. Read the directions carefully, and ask your doctor or other care provider to review them with you.            Procedures and tests performed during your visit     Procedure/Test Number of Times Performed    Basic metabolic panel 1    CBC with platelets differential 1    Chest  XR, 1 view portable 1    Chest CT, IV contrast only - PE protocol 1    D dimer quantitative 1    EKG 12-lead 1    Glucose by meter 1    Routine UA with microscopic 1    Troponin I 2    US Lower Extremity Venous Duplex Left 1    Urine Culture Aerobic Bacterial 1      Orders Needing Specimen Collection     None      Pending Results     Date and Time Order Name Status Description    4/19/2017 2223 Chest CT, IV contrast only - PE protocol Preliminary     4/19/2017 1630 Urine Culture Aerobic Bacterial Preliminary             Pending Culture Results     Date and Time Order Name Status Description    4/19/2017 1630 Urine Culture Aerobic Bacterial Preliminary             Thank you for choosing [facility]       Thank you for choosing [facility] for your care. Our goal is always to provide you with excellent care. Hearing back from our patients is one way we can continue to improve our services. Please take a few minutes to complete the written survey that you may receive in the mail after you visit with us.  "Thank you!        [facility] Information     [employer] lets you send messages to your doctor, view your test results, renew your prescriptions, schedule appointments and more. To sign up, go to [email or web address] . Click on \"Log in\" on the left side of the screen, which will take you to the Welcome page. Then click on \"Sign up Now\" on the right side of the page.     You will be asked to enter the access code listed below, as well as some personal information. Please follow the directions to create your username and password.     Your access code is: [email or web address]  Expires: 2017 11:52 AM     Your access code will  in 90 days. If you need help or a new code, please call your [place] clinic or [phone].        Care EveryWhere ID     This is your Care EveryWhere ID. This could be used by other organizations to access your [place] medical records  [id number]        After Visit Summary       This is your record. Keep this with you and show to your community pharmacist(s) and doctor(s) at your next visit.                  "

## 2017-04-20 ENCOUNTER — CARE COORDINATION (OUTPATIENT)
Dept: GERIATRIC MEDICINE | Facility: CLINIC | Age: 67
End: 2017-04-20

## 2017-04-20 VITALS
DIASTOLIC BLOOD PRESSURE: 74 MMHG | OXYGEN SATURATION: 100 % | TEMPERATURE: 97.2 F | WEIGHT: 133 LBS | RESPIRATION RATE: 17 BRPM | SYSTOLIC BLOOD PRESSURE: 146 MMHG | BODY MASS INDEX: 20.83 KG/M2 | HEART RATE: 67 BPM

## 2017-04-20 LAB — INTERPRETATION ECG - MUSE: NORMAL

## 2017-04-20 PROCEDURE — 25000132 ZZH RX MED GY IP 250 OP 250 PS 637: Performed by: EMERGENCY MEDICINE

## 2017-04-20 RX ORDER — HYDROMORPHONE HYDROCHLORIDE 2 MG/1
2 TABLET ORAL ONCE
Status: COMPLETED | OUTPATIENT
Start: 2017-04-20 | End: 2017-04-20

## 2017-04-20 RX ADMIN — HYDROMORPHONE HYDROCHLORIDE 2 MG: 2 TABLET ORAL at 00:16

## 2017-04-20 RX ADMIN — HYDROMORPHONE HYDROCHLORIDE 2 MG: 2 TABLET ORAL at 01:56

## 2017-04-20 NOTE — ED NOTES
"Patient returned from CT scan. ERT miguelito Dykes reported \"the IV infiltrated\". I checked on the site. The IV was still in place surrounded by edematous tissue. The patient stated her arm was sore. I called CT and asked what happened. The CT tech informed me that the contrast went in fine and they got a good scan done but the follow up 40ml saline is what they believe infiltrated the vein. CT tech informed me that the radiologist looked at the arm and believes it to be a saline infiltration. CT tech stated she is filing an [facility] report. I removed the IV, elevated the arm, wrapped it in a warm blanket and placed an ice pack on the site.   "

## 2017-04-20 NOTE — PROGRESS NOTES
Contrast Extravasation:    I, Dr. Waldo Rios, was called the CT exam room at approximately 11:40pm on 4/19/2017 to evaluate patient for a contrast extravasation.  I entered the room and found the patient lying on the CT table in no acute distress.  Patient stated that she started to feel pain at the injection site.    IV access site: dorsal right lower forearm/wrist    Physical examination:    Description of affected site:  Area of raised tissue measuring approximately 5 x 5 cm. At the dorsum of the left forearm/wrist  No abnormal skin discoloration in the affected region.     Left hand neurovascular Exam:  Pulses: 2+ at the radial artery and ulnar artery.        -     Sensation: Intact        -     Muscle strength: 5/5    Approximate Extravasation amount: Estimated at approximately 30 cc, likely mostly saline.    I apologized to the patient for the inconvenience, and reassured the patient that this event would most likely resolve on its own without further treatment or complication.  I advised that patient could try hot or cold compress and elevating the extremity above the head in order to alleviate symptoms.    I also advised patient that if symptoms worsen including but not limited to increasing pain, numbness, swelling, redness, etc.  Patient should seek immediate medical care.    Patient was in agreement with this plan and was transferred back to the ED from the radiology department without further complication.    These findings were discussed with ER provider Dr. Hannah Horan at approximately 12:00 AM on 4/20/2017.

## 2017-04-20 NOTE — PROGRESS NOTES
4/20/17: CM received [employer] notification of ED visit on 4/19 for chest pain. EKG and workup completed - EKG normal sinus. Member was d/c back to [facility].   CM spoke with member - she is very tired. She states she thought she was having a heart attack - she had chest pain with no relief from Nitro.     Member's IV infiltrated - she states her arm is still swollen but she understands that will take a few days - she will monitor as will [facility] staff.  Member has f/u scheduled with PCP for 4/26.       Notified member that CM also received email back from Luisana Orlando at [facility] re: pharmacy clams and that CM had sent reply back as continue to state that estradiol and lidocaine patches weren't covered in July and August 2016 due to no PA.  CM sent reply back that they were covered in June and Septemeber there PA should have been in place.  Claudia will f/u back up with pharmacy at [facility].     Jamie Sharma RN, PHN  [employer]

## 2017-04-20 NOTE — DISCHARGE INSTRUCTIONS
*CHEST PAIN, UNCERTAIN CAUSE    Based on your exam today, the exact cause of your chest pain is not certain. Your condition does not seem serious at this time, and your pain does not appear to be coming from your heart. However, sometimes the signs of a serious problem take more time to appear. Therefore, watch for the warning signs listed below.  HOME CARE:  1. Rest today and avoid strenuous activity.  2. Take any prescribed medicine as directed.  FOLLOW UP with your doctor in 1-3 days.   GET PROMPT MEDICAL ATTENTION if any of the following occur:    A change in the type of pain: if it feels different, becomes more severe, lasts longer, or begins to spread into your shoulder, arm, neck, jaw or back    Shortness of breath or increased pain with breathing    Weakness, dizziness, or fainting    Cough with blood or dark colored sputum (phlegm)    Fever over 101  F (38.3  C)    Swelling, pain or redness in one leg    [id number] [address], [place] [address]. All rights reserved. This information is not intended as a substitute for professional medical care. Always follow your healthcare professional's instructions.      Please make an appointment to follow up with Your Primary Care Provider in 1-2 days. Return to the ER with any worsening or concerns.

## 2017-04-20 NOTE — ED NOTES
Updated pt's assisted living facility [facility] that patient will be discharged back home.

## 2017-04-20 NOTE — ED NOTES
Patient was signed out to me by DR. Horan at 1:11 AM.  Please see their note for full details and history.  Patient has chest pain for 2 days.  History of coronary artery disease.  EKG and troponin ×2 negative.  CTPE was negative.  Unfortunately, contrast extravasated into her arm..  Plan at time of sign out is reevaluated arm.  If improving likely discharge home..       Course in the ED:  Reevaluated at 2 AM.  Still some swelling but strength and sensation intact.  No erythema.  Capillary refill is intact.  Still having some pain.  Given some Dilaudid.    At 2:30 patient requested discharge.  No change in the forearm.  Pain actually improved.  Sensation and strength intact.  No signs of compartment syndrome.  Discussed reasons to return including worsening pain numbness weakness or other concerns and findings of compartment syndrome.  Patient states understanding.  Patient is discharged home.      Chet Verdin MD  04/20/17 0233

## 2017-04-21 LAB
BACTERIA SPEC CULT: NORMAL
Lab: NORMAL
MICRO REPORT STATUS: NORMAL
SPECIMEN SOURCE: NORMAL

## 2017-04-25 ENCOUNTER — PRE VISIT (OUTPATIENT)
Dept: CARDIOLOGY | Facility: CLINIC | Age: 67
End: 2017-04-25

## 2017-04-26 ENCOUNTER — OFFICE VISIT (OUTPATIENT)
Dept: INTERNAL MEDICINE | Facility: CLINIC | Age: 67
End: 2017-04-26
Payer: COMMERCIAL

## 2017-04-26 ENCOUNTER — ALLIED HEALTH/NURSE VISIT (OUTPATIENT)
Dept: PHARMACY | Facility: CLINIC | Age: 67
End: 2017-04-26
Payer: COMMERCIAL

## 2017-04-26 VITALS
TEMPERATURE: 98.6 F | HEART RATE: 68 BPM | DIASTOLIC BLOOD PRESSURE: 83 MMHG | BODY MASS INDEX: 20.83 KG/M2 | WEIGHT: 133 LBS | SYSTOLIC BLOOD PRESSURE: 147 MMHG | OXYGEN SATURATION: 98 %

## 2017-04-26 DIAGNOSIS — F32.A DEPRESSION, UNSPECIFIED DEPRESSION TYPE: ICD-10-CM

## 2017-04-26 DIAGNOSIS — R35.0 URINARY FREQUENCY: ICD-10-CM

## 2017-04-26 DIAGNOSIS — G89.4 CHRONIC PAIN SYNDROME: Primary | ICD-10-CM

## 2017-04-26 DIAGNOSIS — F41.9 ANXIETY DISORDER, UNSPECIFIED TYPE: ICD-10-CM

## 2017-04-26 DIAGNOSIS — K21.00 GASTROESOPHAGEAL REFLUX DISEASE WITH ESOPHAGITIS: ICD-10-CM

## 2017-04-26 DIAGNOSIS — F25.9 SCHIZOAFFECTIVE DISORDER, UNSPECIFIED TYPE (H): ICD-10-CM

## 2017-04-26 DIAGNOSIS — Z79.890 HORMONE REPLACEMENT THERAPY (HRT): ICD-10-CM

## 2017-04-26 DIAGNOSIS — K21.00 GASTROESOPHAGEAL REFLUX DISEASE WITH ESOPHAGITIS: Primary | ICD-10-CM

## 2017-04-26 DIAGNOSIS — J30.2 SEASONAL ALLERGIC RHINITIS, UNSPECIFIED ALLERGIC RHINITIS TRIGGER: ICD-10-CM

## 2017-04-26 DIAGNOSIS — G47.01 INSOMNIA DUE TO MEDICAL CONDITION: ICD-10-CM

## 2017-04-26 DIAGNOSIS — G25.81 RESTLESS LEG SYNDROME: ICD-10-CM

## 2017-04-26 DIAGNOSIS — R07.89 ATYPICAL CHEST PAIN: ICD-10-CM

## 2017-04-26 DIAGNOSIS — R11.0 NAUSEA: ICD-10-CM

## 2017-04-26 PROCEDURE — 99214 OFFICE O/P EST MOD 30 MIN: CPT | Performed by: INTERNAL MEDICINE

## 2017-04-26 PROCEDURE — 99606 MTMS BY PHARM EST 15 MIN: CPT | Performed by: PHARMACIST

## 2017-04-26 PROCEDURE — 99607 MTMS BY PHARM ADDL 15 MIN: CPT | Performed by: PHARMACIST

## 2017-04-26 ASSESSMENT — ANXIETY QUESTIONNAIRES
6. BECOMING EASILY ANNOYED OR IRRITABLE: NOT AT ALL
5. BEING SO RESTLESS THAT IT IS HARD TO SIT STILL: NOT AT ALL
GAD7 TOTAL SCORE: 3
1. FEELING NERVOUS, ANXIOUS, OR ON EDGE: SEVERAL DAYS
7. FEELING AFRAID AS IF SOMETHING AWFUL MIGHT HAPPEN: NOT AT ALL
3. WORRYING TOO MUCH ABOUT DIFFERENT THINGS: SEVERAL DAYS
IF YOU CHECKED OFF ANY PROBLEMS ON THIS QUESTIONNAIRE, HOW DIFFICULT HAVE THESE PROBLEMS MADE IT FOR YOU TO DO YOUR WORK, TAKE CARE OF THINGS AT HOME, OR GET ALONG WITH OTHER PEOPLE: NOT DIFFICULT AT ALL
2. NOT BEING ABLE TO STOP OR CONTROL WORRYING: SEVERAL DAYS

## 2017-04-26 ASSESSMENT — PATIENT HEALTH QUESTIONNAIRE - PHQ9: 5. POOR APPETITE OR OVEREATING: NOT AT ALL

## 2017-04-26 NOTE — Clinical Note
Hi Dr. Casey -  Sorry to bug you again.  Talked to Sonya yesterday - please let me know what you think about the recommendations re Tamsulosin and Lexapro.  If you are ok with them, I can place orders, notify [facility] and patient. Thanks so much.

## 2017-04-26 NOTE — MR AVS SNAPSHOT
After Visit Summary   4/26/2017    Sonya Foote    MRN: [id number]           Patient Information     Date Of Birth          1950        Visit Information        Provider Department      4/26/2017 2:20 PM Allan Casey MD [facility]        Today's Diagnoses     Gastroesophageal reflux disease with esophagitis    -  1    Atypical chest pain           Follow-ups after your visit        Additional Services     GASTROENTEROLOGY ADULT REF CONSULT ONLY       Preferred Location: [facility] [phone]      Please be aware that coverage of these services is subject to the terms and limitations of your health insurance plan.  Call member services at your health plan with any benefit or coverage questions.  Any procedures must be performed at a [facility] facility OR coordinated by your clinic's referral office.    Please bring the following with you to your appointment:    (1) Any X-Rays, CTs or MRIs which have been performed.  Contact the facility where they were done to arrange for  prior to your scheduled appointment.    (2) List of current medications   (3) This referral request   (4) Any documents/labs given to you for this referral                  Your next 10 appointments already scheduled     Apr 26, 2017  2:20 PM CDT   Office Visit with Allan Casey MD   [facility] ([facility])    [address] [address] [phone]           Bring a current list of meds and any records pertaining to this visit.  For Physicals, please bring immunization records and any forms needing to be filled out.  Please arrive 10 minutes early to complete paperwork.            May 02, 2017  4:00 PM CDT   Return Sleep Patient with Guanaco Kowalski MD   [facility] ([facility])    [address] [place] [address] [phone]             May 08, 2017  1:00 PM CDT   Nurse Visit with UA NURSE   [facility] [place] ([facility] [place])    [address]  [address]  [place] [address] [phone]            May 12, 2017 10:50 AM CDT   (Arrive by 10:35 AM)   RETURN DIABETES with Michelle Irizarry MD   [facility] ([facility])    [address] [place] [address]  [address] [facility] [address] [phone]            May 18, 2017  1:00 PM CDT   (Arrive by 12:45 PM)   Cystoscopy with Elizabeth Oliver MD   [facility] ([facility])    [address] [place] [address]  [address] [facility] [address] [phone]            Jul 10, 2017 11:00 AM CDT   (Arrive by 10:45 AM)   Return Visit with Alina Jennings MD   [facility] ([facility])    [address] [place] [address] [phone]              Who to contact     If you have questions or need follow up information about today's clinic visit or your schedule please contact [facility] directly at [phone].  Normal or non-critical lab and imaging results will be communicated to you by [facility], letter or phone within 4 business days after the clinic has received the results. If you do not hear from us within 7 days, please contact the clinic through [facility] or phone. If you have a critical or abnormal lab result, we will notify you by phone as soon as possible.  Submit refill requests through [employer] or call your pharmacy and they will forward the refill request to us. Please allow 3 business days for your refill to be completed.          Additional Information About Your Visit        [employer] Information     [employer] lets you send messages to your doctor, view your test results, renew your prescriptions, schedule appointments and  "more. To sign up, go to [email or web address] . Click on \"Log in\" on the left side of the screen, which will take you to the Welcome page. Then click on \"Sign up Now\" on the right side of the page.     You will be asked to enter the access code listed below, as well as some personal information. Please follow the directions to create your username and password.     Your access code is: [email or web address]  Expires: 2017 11:52 AM     Your access code will  in 90 days. If you need help or a new code, please call your [facility] clinic or [phone].        Care EveryWhere ID     This is your Care EveryWhere ID. This could be used by other organizations to access your [facility] medical records  [id number]        Your Vitals Were     Pulse Temperature Pulse Oximetry BMI (Body Mass Index)          68 98.6  F (37  C) (Oral) 98% 20.83 kg/m2         Blood Pressure from Last 3 Encounters:   17 147/83   17 146/74   04/10/17 150/80    Weight from Last 3 Encounters:   17 133 lb (60.3 kg)   17 133 lb (60.3 kg)   04/10/17 133 lb (60.3 kg)              We Performed the Following     GASTROENTEROLOGY ADULT REF CONSULT ONLY          Today's Medication Changes          These changes are accurate as of: 17  1:58 PM.  If you have any questions, ask your nurse or doctor.               These medicines have changed or have updated prescriptions.        Dose/Directions    atorvastatin 40 MG tablet   Commonly known as:  LIPITOR   This may have changed:  when to take this   Used for:  Hyperlipidemia LDL goal <100        Dose:  40 mg   Take 1 tablet (40 mg) by mouth daily   Quantity:  90 tablet   Refills:  3       clopidogrel 75 MG tablet   Commonly known as:  PLAVIX   This may have changed:  when to take this   Used for:  PAD (peripheral artery disease) (H), UGI bleed, Osteoporosis, Nausea        Dose:  75 mg   Take 1 tablet (75 mg) by mouth daily   Quantity:  90 tablet   Refills:  3       ondansetron 4 MG " ODT tab   Commonly known as:  ZOFRAN-ODT   This may have changed:    - when to take this  - reasons to take this   Used for:  Intractable vomiting with nausea, unspecified vomiting type        Dose:  4 mg   Take 1 tablet (4 mg) by mouth every 6 hours   Quantity:  120 tablet   Refills:  0       Phenytoin Sodium Extended 200 MG Caps   This may have changed:  additional instructions   Used for:  Seizure disorder (H)        Dose:  200 mg   Take 200 mg by mouth 2 times daily   Quantity:  60 capsule   Refills:  0                Primary Care Provider Office Phone # Fax #    Allan Casey [phone]       [facility] [address] [facility] [address]        Thank you!     Thank you for choosing [facility]  for your care. Our goal is always to provide you with excellent care. Hearing back from our patients is one way we can continue to improve our services. Please take a few minutes to complete the written survey that you may receive in the mail after your visit with us. Thank you!             Your Updated Medication List - Protect others around you: Learn how to safely use, store and throw away your medicines at www.disposemymeds.org.          This list is accurate as of: 4/26/17  1:58 PM.  Always use your most recent med list.                   Brand Name Dispense Instructions for use    ADVAIR DISKUS 250-50 MCG/DOSE diskus inhaler   Generic drug:  fluticasone-salmeterol      Inhale 1 puff into the lungs 2 times daily       * albuterol (2.5 MG/3ML) 0.083% neb solution     360 mL    INHALE 1 VIAL VIA NEBULIZER EVERY 6 HOURS AS NEEDED       * albuterol 108 (90 BASE) MCG/ACT Inhaler    VENTOLIN HFA    1 Inhaler    Inhale 1-2 puffs into the lungs every 6 hours as needed       aspirin 81 MG EC tablet     90 tablet    Take 1 tablet (81 mg) by mouth daily       atorvastatin 40 MG tablet    LIPITOR    90 tablet    Take 1 tablet (40 mg) by mouth daily       blood  glucose monitoring meter device kit     1 kit    Use to test blood sugars 3 times daily or as directed.       blood glucose monitoring test strip    ONE TOUCH ULTRA    3 Box    Use to test blood sugars 3 times daily or as directed.       calcium citrate-vitamin D 315-250 MG-UNIT Tabs per tablet    CITRACAL    120 tablet    Take 2 tablets by mouth daily       cetirizine 10 MG tablet    zyrTEC    90 tablet    Take 1 tablet (10 mg) by mouth daily as needed for allergies       clopidogrel 75 MG tablet    PLAVIX    90 tablet    Take 1 tablet (75 mg) by mouth daily       CYANOCOBALAMIN PO      Take 2,000 mcg by mouth daily       diclofenac 1 % Gel topical gel    VOLTAREN    100 g    Apply 2 g topically 4 times daily to hands       dorzolamide 2 % ophthalmic solution    TRUSOPT     Place 1 drop into both eyes 2 times daily       * EASY TOUCH LANCETS 30G/TWIST Misc          * thin lancets    NO BRAND SPECIFIED    1 Box    Use to test blood sugar 3 times daily or as directed.       * blood glucose monitoring lancets     3 Box    Use to test blood sugar 3 times daily or as directed.       EPINEPHrine 0.15 MG/0.3ML injection    EPIPEN JR    0.6 mL    Inject 0.3 mLs (0.15 mg) into the muscle as needed for anaphylaxis       escitalopram 20 MG tablet    LEXAPRO    90 tablet    One per day       estradiol 1 MG tablet    ESTRACE    90 tablet    Take 1 tablet (1 mg) by mouth daily       ferrous sulfate 325 (65 FE) MG tablet    IRON    60 tablet    Take 1 tablet (325 mg) by mouth 2 times daily With meals       fluticasone 50 MCG/ACT spray    FLONASE     Spray 1 spray into both nostrils daily       gabapentin 300 MG capsule    NEURONTIN    21 capsule    Take 1 capsule (300 mg) by mouth 2 times daily For 1 week then 1 capsule once daily for 1 week, then stop       hydrochlorothiazide 12.5 MG capsule    MICROZIDE    90 capsule    Take 1 capsule (12.5 mg) by mouth daily       HYDROmorphone 2 MG tablet    DILAUDID    30 tablet    Take 1  tablet (2 mg) by mouth every 3 hours as needed for moderate to severe pain       RENÉE Pack      Take 1 packet by mouth 2 times daily       latanoprost 0.005 % ophthalmic solution    XALATAN    2.5 mL    Place 1 drop into both eyes At Bedtime       levETIRAcetam 500 MG tablet    KEPPRA    180 tablet    Take 1 tablet (500 mg) by mouth 2 times daily       lidocaine 5 % Patch    LIDODERM    30 patch    Apply from 1 to 3 patches to painful area at once for up to 12 h within a 24 h period.  Remove after 12 hours.       lisinopril 10 MG tablet    PRINIVIL/ZESTRIL    90 tablet    Take 1 tablet (10 mg) by mouth daily       LYRICA 100 MG capsule   Generic drug:  pregabalin      Take 100 mg by mouth 2 times daily       MEDICATION GIVEN BY INTRATHECAL PUMP - INSTRUCTION      continuous 4/11/2016 per [facility] [place] [phone]: Conc: Bupivacaine 20 mg/mL and Fentanyl 2000 mcg/mL. Continuous: Bupivacaine 5.052 mg/day and Fentanyl 505.2 mcg/day. Boluses: Up to 7 boluses per 24-hr period of Bupivicaine 0.5992 mg and Fentanyl 59.9 mcg Max daily dose: Bupivacaine 9.1973 mg/day and Fentanyl 919.5883 mcg/day  Pump Last Fill Date:  6/30/2016 Pump Refill Date: 9/20/2016       metoprolol 25 MG 24 hr tablet    TOPROL-XL    30 tablet    Take 1 tablet (25 mg) by mouth daily       MULTIVITAMIN PO      Take 1 tablet by mouth daily       nitroglycerin 0.4 MG sublingual tablet    NITROSTAT    25 tablet    Place 1 tablet (0.4 mg) under the tongue every 5 minutes as needed for chest pain if you are still having symptoms after 3 doses (15 minutes) call 911.       ondansetron 4 MG ODT tab    ZOFRAN-ODT    120 tablet    Take 1 tablet (4 mg) by mouth every 6 hours       * order for DME     1 Month    Equipment being ordered: Depends briefs       * order for DME     1 Device    Equipment being ordered: Power Wheelchair       * order for DME     1 Units    Equipment being ordered: Nebulizer and supplies       *  order for DME     1 Box    Equipment being ordered: Glucerna daily shakes with each meal       * order for DME     1 Device    ACcu check BID       * order for DME     1 Units    Equipment being ordered: Nebulizer and tubing supplies       * order for DME     1 Device    Equipment being ordered: CPAP supplies mask, hose, filters, cushion fax to [facility] at [phone] Disp: 10 DeviceRefills: prn Class: Local PrintStart: 2/10/2017       * order for DME     10 Device    Equipment being ordered: CPAP supplies mask, hose, filters, cushion  fax to [facility] at [phone]       * order for DME     1 Device    Equipment being ordered: wheelchair seat cushion       pantoprazole 40 MG EC tablet    PROTONIX     Take 40 mg by mouth daily       Phenytoin Sodium Extended 200 MG Caps     60 capsule    Take 200 mg by mouth 2 times daily       polyethylene glycol powder    MIRALAX/GLYCOLAX     Take 17 g by mouth daily       prochlorperazine 10 MG tablet    COMPAZINE    20 tablet    Take 1 tablet (10 mg) by mouth every 8 hours as needed       risperiDONE 0.5 MG tablet    risperDAL     Take 0.5 mg by mouth At Bedtime       rOPINIRole 0.25 MG tablet    REQUIP    9 tablet    Take 2 tablets (0.5 mg) by mouth At Bedtime For 3 days then 1 tablet (0.25mg) at bedtime for 3 days, then stop       SPIRIVA HANDIHALER 18 MCG capsule   Generic drug:  tiotropium     30 capsule    INHALE THE CONTENTS OF 1 CAPSULE VIA INHALATION DEVICE DAILY       sucralfate 1 GM tablet    CARAFATE    40 tablet    Take 1 tablet (1 g) by mouth 4 times daily May dissolve in 10 mL water is necessary. (Start upon completion of carafate suspension)       tamsulosin 0.4 MG capsule    FLOMAX    45 capsule    Take 1 capsule (0.4 mg) by mouth every other day       traZODone 100 MG tablet    DESYREL    90 tablet    Take 1 tablet (100 mg) by mouth At Bedtime       vitamin D 2000 UNITS tablet     100 tablet    Take 2,000 Units by mouth daily.       zinc 50 MG Tabs       Take 1 tablet by mouth daily       * Notice:  This list has 14 medication(s) that are the same as other medications prescribed for you. Read the directions carefully, and ask your doctor or other care provider to review them with you.

## 2017-04-26 NOTE — LETTER
[facility]  [address]   [address]  Tel. [phone]  Fax [phone]      Sonya Foote  [address]   [place] [address]            To Whom it May Concern:    Sonya Foote missed work due to recent illness.  Please note that from now on you should crush all medications if able and food should be cut into smallest pieces possible to aide swallowing.  Please contact me for questions or concerns.    Sincerely,       Allan Casey MD  [facility] INTERNAL MEDICINE        4/26/17

## 2017-04-26 NOTE — Clinical Note
Thanks, Dr. Casey.  I notified pt and AL & sent order to pharmacy for new lexapro rx.  Have a good weekend.

## 2017-04-26 NOTE — PROGRESS NOTES
SUBJECTIVE:                                                    Sonya Foote is a 67 year old female who presents to clinic today for the following health issues:    Patient requesting new orders for PT and OT.     ED/UC Followup:    Facility:  [facility]  Date of visit: 4/19/17  Reason for visit: Atypical chest pain   Current Status: Stable. No episodes of chest pain since. Patient requesting order for medications to be crushed by nursing and food to be cut up.        Assessments & Plan (with Medical Decision Making)   The patient does have significant cardiac history, so certainly I was concerned about the possibility of unstable angina/ACS. EKG was done, which did not show any acute abnormality suggestive of acute ischemia. Troponin was checked and was negative. She s had constant pain for more than 24 hours. This is certainly reassuring. Chest x-ray was not remarkable. Urinalysis shows negative nitrite, negative LE, though 8 WBCs. UC was added on. CBC Is Unremarkable. I did order a d-dimer, which was elevated at 0.7. Left lower extremity ultrasound was done given the left thigh pain, which is negative for DVT. The stump is warm, without erythema or obvious sign of infection. The patient reported an iodine allergy but has had several CTs with contrast in the past, she tells me she tolerated these without any issues. She was given a dye bolus for chest CT, which came back showing no pulmonary embolus, though moderate diffuse wall thickening of the entire esophagus concerning for esophagitis. The radiologist did call me and tell me that the IV extravasated at the end of the injection, likely just saline. She was given an ice pack. I did go back and reevaluated her several times, the area of swelling in her right forearm seems to be coming down. The area does not seem tense. She is able to move her fingers without issue. CNS is intact. I did review her chart as she tells me she is recently had an EGD. It does note that  she s had multiple EGDs which have all shown signs of severe esophagitis, all at various stages of healing. Per her med sheet she is currently on Pantoprazole as well as Carafate. It s unclear whether the esophageal thickening seen represents active esophagitis versus scarring. It s unclear whether this is contributing to her chest discomfort. She currently states her chest discomfort has improved. However, she was given a dose of oral Dilaudid here as she does take this at home. Did check a 2nd troponin and this is negative.      Patient will be signed out at this time to the oncoming doctor pending a repeat evaluation of her forearm. Of this continues to look unconcerning within the next half hour to one hour, I do think she is safe for discharge home. Patient has been requesting discharge. I do recommend she follow up with her primary care doctor within the next couple days, return to the ER with any worsening or concerns. I think the likelihood of acute coronary syndrome at this point is low given the ongoing negative troponins, and again there is nothing to suggest PE at this time. She should further distress this with her clinic doctor any need for consideration for further treatment of possible esophagitis.     I have reviewed the nursing notes.  I have reviewed the findings, diagnosis, plan and need for follow up with the patient.  Problem list and histories reviewed & adjusted, as indicated.  Additional history: as documented    Patient Active Problem List   Diagnosis     Hyperlipidemia LDL goal <100     Seizure disorder (H)     ACP (advance care planning)     Osteoporosis     Schizoaffective disorder (H)     AS (sickle cell trait) (H)     Vertigo     Person who has had sex change operation     Claudication in peripheral vascular disease (H)     Intestinal malabsorption     GIB (gastrointestinal bleeding)     Cervicalgia     Health Care Home     Asthma     Adjustment disorder with depressed mood     Chronic  pain syndrome     Open-angle glaucoma     History of colonic polyps     Old myocardial infarction     Iron deficiency anemia     Late effects of cerebrovascular disease     Degeneration of intervertebral disc of lumbosacral region     Thoracic or lumbosacral neuritis or radiculitis     Cerebral infarction due to occlusion or stenosis of carotid artery     Disorder of bone and cartilage     Hereditary and idiopathic peripheral neuropathy     Androgen insensitivity syndrome     PAD (peripheral artery disease) (H)     Chronic systolic congestive heart failure (H)     Carotid stenosis, left     Primary osteoarthritis of both hands     Pain in both upper extremities     Atherosclerotic peripheral vascular disease with rest pain (H)     Essential hypertension with goal blood pressure less than 130/80     Cellulitis of right ankle     Angina pectoris, crescendo (H)     Type 2 diabetes mellitus with diabetic peripheral angiopathy without gangrene, without long-term current use of insulin (H)     Anemia, unspecified type     Critical lower limb ischemia     Testicular feminization     Anxiety disorder due to general medical condition     Anxiety disorder     Central retinal artery occlusion     Lumbosacral radiculitis     Peripheral nerve disease (H)     Osteopenia     Prediabetes     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 (H)     Primary open angle glaucoma of both eyes, severe stage     Pseudophakia of right eye     Cataract, left eye     Diabetes mellitus type 2 without retinopathy (H)     Pyelonephritis     Gastroesophageal reflux disease without esophagitis     Chronic obstructive pulmonary disease, unspecified COPD type (H)     JOSE (obstructive sleep apnea)     Complex sleep apnea syndrome     Coronary artery disease of native artery of native heart with stable angina pectoris (H)     Hyperlipidemia LDL goal <70     Ischemic cardiomyopathy     Bee sting reaction, undetermined intent, subsequent  encounter     Past Surgical History:   Procedure Laterality Date     AMPUTATE LEG ABOVE KNEE Left 6/11/2016    Procedure: AMPUTATE LEG ABOVE KNEE;  Surgeon: Mello Rodriguez MD;  [facility]     AMPUTATE LEG BELOW KNEE Right 11/7/2016    Procedure: AMPUTATE LEG BELOW KNEE;  Surgeon: Savannah Durant MD;  [facility]     AMPUTATE REVISION STUMP LOWER EXTREMITY Right 11/11/2016    Procedure: AMPUTATE REVISION STUMP LOWER EXTREMITY;  Surgeon: Savannah Durant MD;  [facility]     AMPUTATE REVISION STUMP LOWER EXTREMITY Right 11/16/2016    Procedure: AMPUTATE REVISION STUMP LOWER EXTREMITY;  Surgeon: Savannah Durant MD;  [facility]     AMPUTATE TOE(S) Right 1/5/2016    Procedure: AMPUTATE TOE(S);  Surgeon: Mello Gaines DPM;  Location: [facility]     ANGIOGRAM Bilateral 11/21/2014    Procedure: ANGIOGRAM;  Surgeon: Savannah Durant MD;  [facility]     ANGIOGRAM Left 1/16/2015    Procedure: ANGIOGRAM;  Surgeon: Savannah Durant MD;  [facility]     ANGIOGRAM Bilateral 9/14/2015    Procedure: ANGIOGRAM;  Surgeon: Savannah Durant MD;  [facility]     ANGIOGRAM Left 10/12/2015    Procedure: ANGIOGRAM;  Surgeon: Savannah Durant MD;  [facility]     ANGIOGRAM Right 6/6/2016    Procedure: ANGIOGRAM;  Surgeon: Savannah Durant MD;  [facility]     ANGIOPLASTY Right 6/6/2016    Procedure: ANGIOPLASTY;  Surgeon: Savannah Durant MD;  [facility]     APPENDECTOMY       BREAST SURGERY      right breast bx (benign)     CHOLECYSTECTOMY       COLONOSCOPY N/A 8/25/2014    Procedure: COLONOSCOPY;  Surgeon: Mello Ferrer MD;  [facility]     COLONOSCOPY WITH CO2 INSUFFLATION N/A 8/20/2014    Procedure: COLONOSCOPY WITH CO2 INSUFFLATION;  Surgeon: Duane, William Charles, MD;  Location: [facility]     ENDARTERECTOMY FEMORAL  5/23/2014    Procedure: ENDARTERECTOMY FEMORAL;  Surgeon: Jason Joshi MD;  [facility]     ESOPHAGOSCOPY, GASTROSCOPY, DUODENOSCOPY (EGD), COMBINED  12/14/2012    Procedure:  COMBINED ESOPHAGOSCOPY, GASTROSCOPY, DUODENOSCOPY (EGD), BIOPSY SINGLE OR MULTIPLE;  ESOPHAGOSCOPY, GASTROSCOPY, DUODENOSCOPY (EGD), DILATATION ;  Surgeon: Elizabeth Stevenson MD;  Location:  GI     ESOPHAGOSCOPY, GASTROSCOPY, DUODENOSCOPY (EGD), COMBINED  12/31/2013    Procedure: COMBINED ESOPHAGOSCOPY, GASTROSCOPY, DUODENOSCOPY (EGD), BIOPSY SINGLE OR MULTIPLE;;  Surgeon: Clemente Lopez MD;  Location:  GI     ESOPHAGOSCOPY, GASTROSCOPY, DUODENOSCOPY (EGD), COMBINED  4/1/2014    Procedure: COMBINED ESOPHAGOSCOPY, GASTROSCOPY, DUODENOSCOPY (EGD);;  Surgeon: Clemente Lopez MD;  Location:  GI     ESOPHAGOSCOPY, GASTROSCOPY, DUODENOSCOPY (EGD), COMBINED  6/28/2014    Procedure: COMBINED ESOPHAGOSCOPY, GASTROSCOPY, DUODENOSCOPY (EGD);  Surgeon: Clemente Lopez MD;  Location:  GI     ESOPHAGOSCOPY, GASTROSCOPY, DUODENOSCOPY (EGD), COMBINED N/A 8/20/2014    Procedure: COMBINED ESOPHAGOSCOPY, GASTROSCOPY, DUODENOSCOPY (EGD), BIOPSY SINGLE OR MULTIPLE;  Surgeon: Duane, William Charles, MD;  Location: [facility]     ESOPHAGOSCOPY, GASTROSCOPY, DUODENOSCOPY (EGD), COMBINED N/A 8/22/2014    Procedure: COMBINED ESOPHAGOSCOPY, GASTROSCOPY, DUODENOSCOPY (EGD), BIOPSY SINGLE OR MULTIPLE;  Surgeon: Mello Ferrer MD;  Location:  GI     ESOPHAGOSCOPY, GASTROSCOPY, DUODENOSCOPY (EGD), COMBINED N/A 10/2/2014    Procedure: COMBINED ESOPHAGOSCOPY, GASTROSCOPY, DUODENOSCOPY (EGD), BIOPSY SINGLE OR MULTIPLE;  Surgeon: Remy Haskins MD;  Location:  GI     ESOPHAGOSCOPY, GASTROSCOPY, DUODENOSCOPY (EGD), COMBINED Left 12/15/2014    Procedure: COMBINED ESOPHAGOSCOPY, GASTROSCOPY, DUODENOSCOPY (EGD), BIOPSY SINGLE OR MULTIPLE;  Surgeon: Remy Haskins MD;  Location:  GI     ESOPHAGOSCOPY, GASTROSCOPY, DUODENOSCOPY (EGD), COMBINED N/A 2/25/2015    Procedure: COMBINED ENDOSCOPIC ULTRASOUND, ESOPHAGOSCOPY, GASTROSCOPY, DUODENOSCOPY (EGD), FINE NEEDLE ASPIRATE/BIOPSY;  Surgeon: Clemente Lugo MD;  Location:  GI      ESOPHAGOSCOPY, GASTROSCOPY, DUODENOSCOPY (EGD), COMBINED Left 2/25/2015    Procedure: COMBINED ESOPHAGOSCOPY, GASTROSCOPY, DUODENOSCOPY (EGD), BIOPSY SINGLE OR MULTIPLE;  Surgeon: Clemente Lugo MD;  [facility]     ESOPHAGOSCOPY, GASTROSCOPY, DUODENOSCOPY (EGD), COMBINED N/A 9/25/2016    Procedure: COMBINED ESOPHAGOSCOPY, GASTROSCOPY, DUODENOSCOPY (EGD);  Surgeon: Aziza Patiño MD;  [facility]     ESOPHAGOSCOPY, GASTROSCOPY, DUODENOSCOPY (EGD), COMBINED N/A 1/18/2017    Procedure: COMBINED ESOPHAGOSCOPY, GASTROSCOPY, DUODENOSCOPY (EGD), BIOPSY SINGLE OR MULTIPLE;  Surgeon: Clemente Lopez MD;  [facility]     FASCIOTOMY LOWER EXTREMITY Left 6/10/2016    Procedure: FASCIOTOMY LOWER EXTREMITY;  Surgeon: Mello Rodriguez MD;  [facility]     HC CAPSULE ENDOSCOPY N/A 8/25/2014    Procedure: CAPSULE/PILL CAM ENDOSCOPY;  Surgeon: Remy Haskins MD;  [facility]     HC CAPSULE ENDOSCOPY N/A 10/2/2014    Procedure: CAPSULE/PILL CAM ENDOSCOPY;  Surgeon: Remy Haskins MD;  [facility]     ORTHOPEDIC SURGERY      broken wrist repair     SEX TRANSFORMATION SURGERY, MALE TO FEMALE      1974     SINUS SURGERY      cyst removed     TONSILLECTOMY       VASCULAR SURGERY      Left carotid stent       Social History   Substance Use Topics     Smoking status: Former Smoker     Packs/day: 2.50     Years: 30.00     Types: Cigarettes, Cigars     Quit date: 11/1/2000     Smokeless tobacco: Never Used     Alcohol use No     Family History   Problem Relation Age of Onset     Dementia Mother      Glaucoma Mother      DIABETES Mother      Coronary Artery Disease Mother      MI     Glaucoma Father      DIABETES Father      Heart Failure Father      CANCER Maternal Aunt      leukemia     Schizophrenia Brother      Depression Brother      Suicide Sister      Depression Sister      DIABETES Sister      CANCER Maternal Aunt      ovarian     Glaucoma Maternal Grandmother      DIABETES Maternal  Grandmother      Glaucoma Maternal Grandfather      DIABETES Maternal Grandfather      Glaucoma Paternal Grandmother      DIABETES Paternal Grandmother      Glaucoma Paternal Grandfather      DIABETES Paternal Grandfather      Breast Cancer Sister      CEREBROVASCULAR DISEASE Brother          Current Outpatient Prescriptions   Medication Sig Dispense Refill     albuterol (VENTOLIN HFA) 108 (90 BASE) MCG/ACT Inhaler Inhale 1-2 puffs into the lungs every 6 hours as needed 1 Inhaler 11     diclofenac (VOLTAREN) 1 % GEL topical gel Apply 2 g topically 4 times daily to hands 100 g 11     tamsulosin (FLOMAX) 0.4 MG capsule Take 1 capsule (0.4 mg) by mouth every other day 45 capsule 1     cetirizine (ZYRTEC) 10 MG tablet Take 1 tablet (10 mg) by mouth daily as needed for allergies 90 tablet 1     lisinopril (PRINIVIL/ZESTRIL) 10 MG tablet Take 1 tablet (10 mg) by mouth daily 90 tablet 3     pantoprazole (PROTONIX) 40 MG EC tablet Take 40 mg by mouth daily       pregabalin (LYRICA) 100 MG capsule Take 100 mg by mouth 2 times daily       risperiDONE (RISPERDAL) 0.5 MG tablet Take 0.5 mg by mouth At Bedtime       ferrous sulfate (IRON) 325 (65 FE) MG tablet Take 1 tablet (325 mg) by mouth 2 times daily With meals 60 tablet 2     lidocaine (LIDODERM) 5 % Patch Apply from 1 to 3 patches to painful area at once for up to 12 h within a 24 h period.  Remove after 12 hours. 30 patch 1     blood glucose monitoring (ONE TOUCH ULTRA) test strip Use to test blood sugars 3 times daily or as directed. 3 Box 3     order for DME Equipment being ordered: wheelchair seat cushion 1 Device 0     order for DME Equipment being ordered: CPAP supplies mask, hose, filters, cushion    fax to [facility] at [phone] 10 Device prn     sucralfate (CARAFATE) 1 GM tablet Take 1 tablet (1 g) by mouth 4 times daily May dissolve in 10 mL water is necessary. (Start upon completion of carafate suspension) 40 tablet 5     order for DME Equipment being  ordered: CPAP supplies mask, hose, filters, cushion fax to [facility] at [phone]  Disp: 10 Device Refills: prn   Class: Local Print Start: 2/10/2017 1 Device 0     order for DME Equipment being ordered: Nebulizer and tubing supplies 1 Units 0     albuterol (2.5 MG/3ML) 0.083% neb solution INHALE 1 VIAL VIA NEBULIZER EVERY 6 HOURS AS NEEDED 360 mL 11     order for DME ACcu check BID 1 Device 0     ondansetron (ZOFRAN-ODT) 4 MG ODT tab Take 1 tablet (4 mg) by mouth every 6 hours (Patient taking differently: Take 4 mg by mouth every 6 hours as needed ) 120 tablet 0     metoprolol (TOPROL-XL) 25 MG 24 hr tablet Take 1 tablet (25 mg) by mouth daily 30 tablet 0     CYANOCOBALAMIN PO Take 2,000 mcg by mouth daily       Nutritional Supplements (RENÉE) PACK Take 1 packet by mouth 2 times daily       polyethylene glycol (MIRALAX/GLYCOLAX) powder Take 17 g by mouth daily       HYDROmorphone (DILAUDID) 2 MG tablet Take 1 tablet (2 mg) by mouth every 3 hours as needed for moderate to severe pain 30 tablet 0     fluticasone (FLONASE) 50 MCG/ACT nasal spray Spray 1 spray into both nostrils daily       ADVAIR DISKUS 250-50 MCG/DOSE diskus inhaler Inhale 1 puff into the lungs 2 times daily        calcium citrate-vitamin D (CITRACAL) 315-250 MG-UNIT TABS Take 2 tablets by mouth daily 120 tablet 5     hydrochlorothiazide (MICROZIDE) 12.5 MG capsule Take 1 capsule (12.5 mg) by mouth daily 90 capsule 3     escitalopram (LEXAPRO) 20 MG tablet One per day 90 tablet 3     estradiol (ESTRACE) 1 MG tablet Take 1 tablet (1 mg) by mouth daily 90 tablet 3     levETIRAcetam (KEPPRA) 500 MG tablet Take 1 tablet (500 mg) by mouth 2 times daily 180 tablet 1     traZODone (DESYREL) 100 MG tablet Take 1 tablet (100 mg) by mouth At Bedtime 90 tablet 3     aspirin EC 81 MG EC tablet Take 1 tablet (81 mg) by mouth daily 90 tablet 3     clopidogrel (PLAVIX) 75 MG tablet Take 1 tablet (75 mg) by mouth daily (Patient taking differently: Take 75 mg  by mouth At Bedtime ) 90 tablet 3     blood glucose monitoring (ONE TOUCH ULTRA 2) meter device kit Use to test blood sugars 3 times daily or as directed. 1 kit 0     blood glucose monitoring (ONE TOUCH ULTRASOFT) lancets Use to test blood sugar 3 times daily or as directed. 3 Box 3     phenytoin 200 MG CAPS Take 200 mg by mouth 2 times daily (Patient taking differently: Take 200 mg by mouth 2 times daily (takes at 8 AM and 8 PM)) 60 capsule 0     dorzolamide (TRUSOPT) 2 % ophthalmic solution Place 1 drop into both eyes 2 times daily        SPIRIVA HANDIHALER 18 MCG inhalation capsule INHALE THE CONTENTS OF 1 CAPSULE VIA INHALATION DEVICE DAILY 30 capsule 2     zinc 50 MG TABS Take 1 tablet by mouth daily       nitroglycerin (NITROSTAT) 0.4 MG SL tablet Place 1 tablet (0.4 mg) under the tongue every 5 minutes as needed for chest pain if you are still having symptoms after 3 doses (15 minutes) call 911. 74 tablet 1     MEDICATION GIVEN BY INTRATHECAL PUMP - INSTRUCTION continuous 4/11/2016 per [facility] in [place] [phone]:  Conc: Bupivacaine 20 mg/mL and Fentanyl 2000 mcg/mL.  Continuous: Bupivacaine 5.052 mg/day and Fentanyl 505.2 mcg/day.  Boluses: Up to 7 boluses per 24-hr period of Bupivicaine 0.5992 mg and Fentanyl 59.9 mcg  Max daily dose: Bupivacaine 9.1973 mg/day and Fentanyl 919.5883 mcg/day    Pump Last Fill Date:  6/30/2016  Pump Refill Date: 9/20/2016       latanoprost (XALATAN) 0.005 % ophthalmic solution Place 1 drop into both eyes At Bedtime 2.5 mL 11     atorvastatin (LIPITOR) 40 MG tablet Take 1 tablet (40 mg) by mouth daily (Patient taking differently: Take 40 mg by mouth At Bedtime ) 90 tablet 3     order for DME Equipment being ordered: Glucerna daily shakes with each meal 1 Box 11     prochlorperazine (COMPAZINE) 10 MG tablet Take 1 tablet (10 mg) by mouth every 8 hours as needed 20 tablet 0     thin (NO BRAND SPECIFIED) lancets Use to test blood sugar 3 times  daily or as directed. 1 Box 10     ORDER FOR DME Equipment being ordered: Nebulizer and supplies 1 Units 0     ORDER FOR DME Equipment being ordered: Power Wheelchair 1 Device 0     ORDER FOR DME Equipment being ordered: Depends briefs 1 Month 11     EASY TOUCH LANCETS 30G/TWIST MISC        Cholecalciferol (VITAMIN D) 2000 UNITS tablet Take 2,000 Units by mouth daily. 100 tablet 3     Multiple Vitamin (MULTIVITAMIN OR) Take 1 tablet by mouth daily        EPINEPHrine (EPIPEN JR) 0.15 MG/0.3ML injection Inject 0.3 mLs (0.15 mg) into the muscle as needed for anaphylaxis 0.6 mL 1     gabapentin (NEURONTIN) 300 MG capsule Take 1 capsule (300 mg) by mouth 2 times daily For 1 week then 1 capsule once daily for 1 week, then stop 21 capsule 0     rOPINIRole (REQUIP) 0.25 MG tablet Take 2 tablets (0.5 mg) by mouth At Bedtime For 3 days then 1 tablet (0.25mg) at bedtime for 3 days, then stop 9 tablet 0     BP Readings from Last 3 Encounters:   04/20/17 146/74   04/10/17 150/80   04/04/17 155/75    Wt Readings from Last 3 Encounters:   04/19/17 133 lb (60.3 kg)   04/10/17 133 lb (60.3 kg)   04/03/17 133 lb (60.3 kg)                    Reviewed and updated as needed this visit by clinical staff  Tobacco  Allergies  Med Hx  Surg Hx  Fam Hx  Soc Hx      Reviewed and updated as needed this visit by Provider         ROS:  C: NEGATIVE for fever, chills, change in weight  E/M: NEGATIVE for ear, mouth and throat problems  R: NEGATIVE for significant cough or SOB  GI: NEGATIVE for nausea, abdominal pain, + heartburn, or change in bowel habits  : NEGATIVE for frequency, dysuria, or hematuria  M: NEGATIVE for significant arthralgias or myalgia  H: NEGATIVE for bleeding problems  P: NEGATIVE for changes in mood or affect    OBJECTIVE:                                                    /83  Pulse 68  Temp 98.6  F (37  C) (Oral)  Wt 133 lb (60.3 kg)  SpO2 98%  BMI 20.83 kg/m2  There is no height or weight on file to  calculate BMI.  GENERAL: alert and no distress  EYES: Eyes grossly normal to inspection, extraocular movements - intact, and PERRL  HENT: ear canals- normal; TMs- normal; Nose- normal; Mouth- no ulcers, no lesions  NECK: no tenderness, no adenopathy, no asymmetry, no masses, no stiffness; thyroid- normal to palpation  RESP: lungs clear to auscultation - no rales, no rhonchi, no wheezes  CV: regular rates and rhythm, normal S1 S2, no S3 or S4 and no click or rub   MS: in wheelchair, amputee kendell.  PSYCH: Alert and oriented times 3; speech- coherent , normal rate and volume; able to articulate logical thoughts, able to abstract reason, no tangential thoughts, no hallucinations or delusions, affect- normal     ASSESSMENT/PLAN:                                                      (K21.0) Gastroesophageal reflux disease with esophagitis  (primary encounter diagnosis)  Comment: appears to still be of issues, on PPI, with crush pills, mince food and see [facility]  Plan: GASTROENTEROLOGY ADULT REF CONSULT ONLY          (R07.89) Atypical chest pain  Comment: ?related to above, just saw [facility] 1 week ago as noted with stable course per patient and mild dose change Lisinopril.  Plan: GASTROENTEROLOGY ADULT REF CONSULT ONLY          See Patient Instructions, may need home care issues, discussed with care coordinator.  Forms filled out for patient request    Allan Casey MD  [facility]    THE MEDICATION LIST HAS BEEN FULLY RECONCILED BY THE M.D. AND THE NURSING STAFF.

## 2017-04-26 NOTE — PROGRESS NOTES
SUBJECTIVE/OBJECTIVE:                                                    Sonya Foote is a 67 year old female called today for a follow-up visit for Medication Therapy Management.  She was referred to me from [facility].     Chief Complaint: follow-up re med changes from 3/31/17 MT visit  Allergies/ADRs: Reviewed in Epic  Tobacco: History of tobacco dependence - quit several years ago   Alcohol: none  Activity: ambulates by wheelchair; both legs amputated  PMH: Reviewed in Epic    Medication Adherence: has assistance setting up med boxes    Pain: Current medication includes pump therapy with Fentanyl for back pain; h/o DJD/lower back pain.  Also on Hydromorphone 2mg q3hr prn, Lyrica 100mg bid for neuropathy and phantom limb pain, Lidocaine patches. Recently started on Diclofenac gel for hand pain; has only been using once daily & hasn't been helpful.  Following last visit, was tapered and dc'd from Gabapentin due to also being on Lyrica, and she has tolerated d'c well.     Depression/Anxiety/Schizoaffective/Insomnia:  Current medications include: Escitalopram 20mg once daily and Risperidone 0.5mg at bedtime, Trazodone 100mg at bedtime.  Prn Diazepam dc'd following last visit due to high risk med & non-use.  Pt notes mood ok currently and is open to trying reduction in Escitalopram dose.  Of note, is on several QT prolonging agents.    ERT: Current medication includes Estradiol 1mg daily. Pt continues on this due to sex change.  We discussed at last visit potentially reducing dose of Estradiol to 0.75mg, and she was open to this at the time & reduction approved by MD, however she then decided she prefers to continue with current dose for now.  She is aware of risks of med.  Potential medication side effects she is experiencing: nausea, edema, HTN, history of CVA and MI.    GERD/nausea: Current medications include: Protonix (pantoprazole) 40mg once daily, Sucralfate 1gm qid.  Also on prn Zofran ODT and prn  "Prochlorperazine. The patient does have a history of GI bleed, multiple hospitalizations related to bleed, history of ulcer.  h/o  anemia, blood transfusion.  Previously noted that nausea has always been a significant problem for her.  She did have a recent ED visit due to atypical chest pain, and noted \"moderate diffuse wall thickening of the entire esophagus concerning for esophagitis.\"  At last visit, discussed potentially reducing Sucralfate dose, but pt again changed mind and preferred continuing with qid dosing due to her history.    RLS:  Current medication includes none.  Tapered and dc'd Ropinirole following last visit, and has tolerated this well.    Urinary frequency/incontinence:  Current medication includes Tamsulosin 0.4mg every other day.  We reduced this to qod from qd dosing following last visit b/c she felt initiation of this med had contributed to significant increase in urinary frequency/incontinence.  She states today that this has improved somewhat with reduction in dose, and asks if it could be dc'd completely.    Allergic rhinitis: Current medications include cetirizine 10mg once daily prn (changed from qd scheduled since last visit) and fluticasone nasal spray 1 spray(s) in each nostril once daily. Primary symptoms are runny nose and sneezing. She has not been using the prn dose, and reports some allergy symptoms.  She didn't realize it was available for prn use still.        Current labs include:  BP Readings from Last 3 Encounters:   04/26/17 147/83   04/20/17 146/74   04/10/17 150/80     Today's Vitals: There were no vitals taken for this visit.  Lab Results   Component Value Date    A1C 4.1 01/24/2017   .  Lab Results   Component Value Date    CHOL 155 06/06/2016     Lab Results   Component Value Date    TRIG 62 06/06/2016     Lab Results   Component Value Date    HDL 65 06/06/2016     Lab Results   Component Value Date    LDL 77 06/06/2016       Liver Function Studies -   Recent Labs "   Lab Test  01/24/17   1156   PROTTOTAL  7.4   ALBUMIN  3.0*   BILITOTAL  0.1*   ALKPHOS  162*   AST  29   ALT  35       Lab Results   Component Value Date    UCRR 54 02/05/2016    MICROL 6 02/05/2016    UMALCR 10.72 02/05/2016       Last Basic Metabolic Panel:  Lab Results   Component Value Date     04/19/2017      Lab Results   Component Value Date    POTASSIUM 3.5 04/19/2017     Lab Results   Component Value Date    CHLORIDE 108 04/19/2017     Lab Results   Component Value Date    BUN 17 04/19/2017     Lab Results   Component Value Date    CR 0.60 04/19/2017     GFR Estimate   Date Value Ref Range Status   04/19/2017 >90  Non  GFR Calc   >60 mL/min/1.7m2 Final   01/24/2017 >90  Non  GFR Calc   >60 mL/min/1.7m2 Final   12/28/2016 >90  Non  GFR Calc   >60 mL/min/1.7m2 Final     GFR Estimate If Black   Date Value Ref Range Status   04/19/2017 >90   GFR Calc   >60 mL/min/1.7m2 Final   01/24/2017 >90   GFR Calc   >60 mL/min/1.7m2 Final   12/28/2016 >90   GFR Calc   >60 mL/min/1.7m2 Final     TSH   Date Value Ref Range Status   11/04/2016 0.84 0.40 - 4.00 mU/L Final   ]    Most Recent Immunizations   Administered Date(s) Administered     Influenza (High Dose) 3 valent vaccine 09/03/2016     Influenza (IIV3) 09/01/2013     Pneumococcal (PCV 13) 10/22/2015     Pneumococcal 23 valent 02/22/2014     TD (ADULT, 7+) 01/01/2011       ASSESSMENT:                                                       Current medications were reviewed today.     Medication Adherence: no issues identified    Pain: Discussed that Diclofenac gel should be used qid & again explained how to use.  Pt agrees to increase frequency of use from qd to qid.    Depression/Anxiety/Schizoaffective/Insomnia:  Although most recent QTc interval improved from previously, pt may benefit from reduction in Lexapro dose, and she is open to this.  May increase risk of  QTc prolongation, especially in combo with her other meds, such as Risperidone, Trazodone, antiemetics.  Additionally, she is on > max dose of 10mg recommended for pt's 60 and older, & has multiple risk factors for QTc interval prolongation; she has had problems with atypical chest pain as well.      ERT: Pt prefers to continue with current dose of Estradiol for now.  She is aware of risks of med.  Will readdress in future.    GERD/nausea: Do not suggest any change to current therapy due to recent ED visit for atypical chest pain that may have been related to esophagitis symptoms.    RLS:  Stable - tolerated taper and d'c of Ropinirole.    Urinary frequency/incontinence:  Due to pt feeling Tamsulosin reduction was somewhat helpful in UI/frequency, she is requesting d'c altogether.     Allergic rhinitis: Reminded pt that Cetirizine is available for prn use.      PLAN:                                                      1.  Use the Diclofenac gel as directed - 2gm applied to hands four times daily.    2.  MD may consider d'c of Tamsulosin altogether & pt to monitor for urinary symptoms.    3.  MD may consider reduction in Lexapro to 10mg daily.    4.  If having allergy symptoms, remember to ask staff for Cetirizine 10mg daily as needed dose.      I spent 15 minutes with this patient today. A copy of the visit note was provided to the patient's primary care provider.    Will follow up in 1 month over the phone, sooner if necessary.    The patient was mailed a summary of these recommendations as an after visit summary.     Elizabeth Rose, Pharm.D.,[facility]  Board Certified Geriatric Pharmacist  Medication Therapy Management Pharmacist  [phone]    MD ok'd d'c of Tamsulosin and reduction in Lexapro to 10mg daily.  I informed patient and AL of changes & sent rx for generic Lexapro 10mg daily to [facility].  Discussed with pt to monitor urinary symptoms and mood.

## 2017-04-26 NOTE — NURSING NOTE
"Chief Complaint   Patient presents with     ER F/U       Initial /83  Pulse 68  Temp 98.6  F (37  C) (Oral)  Wt 133 lb (60.3 kg)  SpO2 98%  BMI 20.83 kg/m2 Estimated body mass index is 20.83 kg/(m^2) as calculated from the following:    Height as of 1/9/17: 5' 7\" (1.702 m).    Weight as of this encounter: 133 lb (60.3 kg).  Medication Reconciliation: complete   Daniela Hancock CMA      "

## 2017-04-27 ENCOUNTER — CARE COORDINATION (OUTPATIENT)
Dept: GERIATRIC MEDICINE | Facility: CLINIC | Age: 67
End: 2017-04-27

## 2017-04-27 ASSESSMENT — ANXIETY QUESTIONNAIRES: GAD7 TOTAL SCORE: 3

## 2017-04-27 ASSESSMENT — ASTHMA QUESTIONNAIRES: ACT_TOTALSCORE: 10

## 2017-04-27 NOTE — PROGRESS NOTES
4/27/17: CM received staff message from RN CC at [facility] requesting CM to contact member re: home care needs and make referral - PCP will sign off.    CM spoke with member - she states that she feels like her upper body is not as strong and she is continuously worried about falls (has had 2-3 falls since amputation).  CM placed call to [facility] - gave v.o. From PCP for PT/OT for home safety and possible DME needs.      Member aware.     Jamie Sharma RN, N  [facility]

## 2017-04-27 NOTE — PATIENT INSTRUCTIONS
Recommendations from today's MTM visit:                                                        1.  Use the Diclofenac gel as directed - 2gm applied to hands four times daily.    2.  MD may consider discontinuation of Tamsulosin altogether.  Please  monitor for urinary symptoms.    3.  MD may consider reduction in Lexapro (Escitalopram) to 10mg daily.     4.  If having allergy symptoms, remember to ask staff for Cetirizine 10mg daily as needed dose.    Next MTM visit: one month, sooner if necessary    To schedule another MTM appointment, please call me directly or you may call the MTM scheduling line at [phone] or toll-free at [phone].     My Clinical Pharmacist's contact information:                                                      It was a pleasure talking to you today!  Please feel free to contact me with any questions or concerns you have.      Elizabeth Rose, Pharm.D.,[facility]  Board Certified Geriatric Pharmacist  Medication Therapy Management Pharmacist  [phone]      You may receive a survey about the MTM services you received.  I would appreciate your feedback to help me serve you better in the future. Please fill it out and return it when you can. Your comments will be anonymous.

## 2017-04-28 DIAGNOSIS — I10 ESSENTIAL HYPERTENSION WITH GOAL BLOOD PRESSURE LESS THAN 130/80: ICD-10-CM

## 2017-04-28 LAB
ANION GAP SERPL CALCULATED.3IONS-SCNC: 6 MMOL/L (ref 3–14)
BUN SERPL-MCNC: 14 MG/DL (ref 7–30)
CALCIUM SERPL-MCNC: 9.1 MG/DL (ref 8.5–10.1)
CHLORIDE SERPL-SCNC: 106 MMOL/L (ref 94–109)
CO2 SERPL-SCNC: 28 MMOL/L (ref 20–32)
CREAT SERPL-MCNC: 0.54 MG/DL (ref 0.52–1.04)
GFR SERPL CREATININE-BSD FRML MDRD: NORMAL ML/MIN/1.7M2
GLUCOSE SERPL-MCNC: 96 MG/DL (ref 70–99)
POTASSIUM SERPL-SCNC: 4.3 MMOL/L (ref 3.4–5.3)
SODIUM SERPL-SCNC: 140 MMOL/L (ref 133–144)

## 2017-04-28 PROCEDURE — 80048 BASIC METABOLIC PNL TOTAL CA: CPT | Performed by: INTERNAL MEDICINE

## 2017-04-28 PROCEDURE — 36415 COLL VENOUS BLD VENIPUNCTURE: CPT | Performed by: INTERNAL MEDICINE

## 2017-04-28 RX ORDER — ESCITALOPRAM OXALATE 10 MG/1
10 TABLET ORAL DAILY
Qty: 90 TABLET | Refills: 1 | Status: ON HOLD | OUTPATIENT
Start: 2017-04-28 | End: 2017-05-31

## 2017-05-01 ENCOUNTER — TELEPHONE (OUTPATIENT)
Dept: CARDIOLOGY | Facility: CLINIC | Age: 67
End: 2017-05-01

## 2017-05-01 NOTE — TELEPHONE ENCOUNTER
"Result note from Dr. Anderson: \"Normal electrolytes and renal function.  Continue with the current medical therapy.  Bj Anderson MD, [facility]\"    Writer called pt with Dr. Anderson above response. Writer informed pt that Dr. Anderson would not like to make any changes in her medications. Pt verbalized understanding and has no further questions or concerns at this time.     4/4/17: OV Dr. Anderson:    ASSESSMENT:   1. Sonya Foote is a delightful 67-year-old female who presents for coronary artery disease after her inferior wall MI in 09/2016. She is currently stable without evidence of congestive heart failure. Ejection fraction is low at 35%-40%. This is not low enough to require an automatic implantable defibrillator. We will continue to follow and consider an echocardiogram in September. At that time we will also make a decision whether to discontinue her Plavix. Followup nuclear stress testing may be suggested at that time.   2. Diabetes mellitus, being taken care of by her primary care physicians.   3. Peripheral arterial disease status post bilateral above-the-knee amputations.   4. Systolic hypertension. I have taken the liberty of increasing her lisinopril to 10 mg daily. This is for blood pressure and low ejection fraction. I have asked her assisted living personnel to provide us with a blood pressure in 1 month. We will also obtain a BMP at that time. We will up-titrate her lisinopril as necessary to maintain a systolic blood pressure below 130.     "

## 2017-05-02 ENCOUNTER — OFFICE VISIT (OUTPATIENT)
Dept: SLEEP MEDICINE | Facility: CLINIC | Age: 67
End: 2017-05-02
Payer: COMMERCIAL

## 2017-05-02 ENCOUNTER — TELEPHONE (OUTPATIENT)
Dept: INTERNAL MEDICINE | Facility: CLINIC | Age: 67
End: 2017-05-02

## 2017-05-02 ENCOUNTER — CARE COORDINATION (OUTPATIENT)
Dept: GERIATRIC MEDICINE | Facility: CLINIC | Age: 67
End: 2017-05-02

## 2017-05-02 VITALS
WEIGHT: 133 LBS | HEART RATE: 85 BPM | BODY MASS INDEX: 20.83 KG/M2 | OXYGEN SATURATION: 96 % | SYSTOLIC BLOOD PRESSURE: 131 MMHG | DIASTOLIC BLOOD PRESSURE: 69 MMHG

## 2017-05-02 DIAGNOSIS — G47.33 OSA (OBSTRUCTIVE SLEEP APNEA): ICD-10-CM

## 2017-05-02 DIAGNOSIS — J30.2 SEASONAL ALLERGIC RHINITIS, UNSPECIFIED ALLERGIC RHINITIS TRIGGER: Primary | ICD-10-CM

## 2017-05-02 DIAGNOSIS — G47.39 COMPLEX SLEEP APNEA SYNDROME: ICD-10-CM

## 2017-05-02 PROCEDURE — 99213 OFFICE O/P EST LOW 20 MIN: CPT | Performed by: INTERNAL MEDICINE

## 2017-05-02 RX ORDER — CETIRIZINE HYDROCHLORIDE 10 MG/1
10 TABLET ORAL DAILY PRN
Qty: 90 TABLET | Refills: 3 | Status: SHIPPED | OUTPATIENT
Start: 2017-05-02 | End: 2018-07-27

## 2017-05-02 NOTE — PATIENT INSTRUCTIONS

## 2017-05-02 NOTE — PROGRESS NOTES
5/2/17: CM received call from member stating that she received bill for rent and it is $859 - she feels like this is high and states that AL told her to call this CM to find out her payment/GRH.   CM last had information on rent/GRH amount from 10/2016.  CM placed call to [facility] - spoke with Melonie.  Melonie stated that effective 12/2016 member's payment is $791 GRH $100.    CM relayed this to [facility]   CM also placed call to Jovana at [employer] ([place]) and left  for call back.     Jamie Sharma RN, N  [facility]

## 2017-05-02 NOTE — PROGRESS NOTES
Obstructive Sleep Apnea- PAP Follow-Up Visit:    Chief Complaint   Patient presents with     RECHECK     cpap f/u       Sonya Foote comes in today for follow-up of their moderate sleep apnea, managed with CPAP.     Overall, the patient rates their experience with PAP as 6 (0 poor, 10 great). The mask is comfortable. The mask is leaking, 7 nights per week. They are not snoring with the mask on. They are having gasp arousals.  They are not having significant oral/nasal dryness. The pressure settings are comfortable.     Patient uses full-face mask.    Bedtime is typically 8-9pm. Usually it takes about 60-90 minutes to fall asleep with the mask on. Wake time is typically 630am.  Patient is using PAP therapy 3-4 hours per night. The patient is usually getting 5-6 hours of sleep per night.    Patient does feel rested in the morning.    [place] Sleepiness Scale: 4/24    ResMed   CPAP 12 cmH2O download:  5 total days of use. 25 nonuse days. 5 days with >4 hours use.  Average use 2 hours 50 minutes per day.     Reports just getting over bronchitis.  Hadn't used CPAP much due to illness and coughing (unable to keep CPAP on) but now restarted.  Unable to get download from machine due to age and vendor.    Has been slowly losing weight per report.  Feeling like appetite hasn't been as good lately.       Past medical/surgical history, family history, social history, medications and allergies were reviewed.      Problem List:  Patient Active Problem List    Diagnosis Date Noted     Bee sting reaction, undetermined intent, subsequent encounter 04/06/2017     Priority: Medium     Coronary artery disease of native artery of native heart with stable angina pectoris (H) 04/04/2017     Priority: Medium     Hyperlipidemia LDL goal <70 04/04/2017     Priority: Medium     Ischemic cardiomyopathy 04/04/2017     Priority: Medium     Complex sleep apnea syndrome 03/15/2017     Priority: Medium     Pyelonephritis 12/22/2016     Priority:  Medium     Primary open angle glaucoma of both eyes, severe stage 12/08/2016     Priority: Medium     Pseudophakia of right eye 12/08/2016     Priority: Medium     Cataract, left eye 12/08/2016     Priority: Medium     Diabetes mellitus type 2 without retinopathy (H) 12/08/2016     Priority: Medium     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 (H) 11/23/2016     Priority: Medium     Critical lower limb ischemia 11/07/2016     Priority: Medium     Anemia, unspecified type 10/22/2016     Priority: Medium     Type 2 diabetes mellitus with diabetic peripheral angiopathy without gangrene, without long-term current use of insulin (H) 10/12/2016     Priority: Medium     Angina pectoris, crescendo (H) 09/17/2016     Priority: Medium     Cellulitis of right ankle 09/07/2016     Priority: Medium     Essential hypertension with goal blood pressure less than 130/80 09/02/2016     Priority: Medium     Atherosclerotic peripheral vascular disease with rest pain (H) 06/06/2016     Priority: Medium     Primary osteoarthritis of both hands 05/19/2016     Priority: Medium     Pain in both upper extremities 05/19/2016     Priority: Medium     Carotid stenosis, left 04/01/2016     Priority: Medium     Chronic systolic congestive heart failure (H) 03/08/2016     Priority: Medium     PAD (peripheral artery disease) (H) 09/14/2015     Priority: Medium     Intestinal malabsorption 08/06/2014     Priority: Medium     updating diagnosis code for icd10 cutover       Person who has had sex change operation 01/20/2014     Priority: Medium     AS (sickle cell trait) (H) 10/08/2013     Priority: Medium     Chronic obstructive pulmonary disease, unspecified COPD type (H) 01/01/2013     Priority: Medium     Central retinal artery occlusion 08/19/2009     Priority: Medium     Anxiety disorder due to general medical condition 07/29/2009     Priority: Medium     Peripheral nerve disease (H) 07/10/2009     Priority: Medium      Testicular feminization 2009     Priority: Medium     Lumbosacral radiculitis 2009     Priority: Medium     Prediabetes 2008     Priority: Medium     Osteopenia 2007     Priority: Medium     Anxiety disorder 2007     Priority: Medium     JOSE (obstructive sleep apnea) 2017     10/26/2012 - Polysomnography at [facility] -  min; AHI 14; RDI 22; ERIN 5/hr; No PLMs or RBD.  2012 - Titration Polysomnography at [facility] - Started on CPAP and developed treatment-emergent central apneas.  Adaptive Servo-Ventilation titration optimal but did well on CPAP 8 as well. Put on CPAP 8 cmH2O.    Has systolic CHF, intrathecal narcotic pump, and treatment-emergent Central Sleep Apnea on CPAP.  Titration Study:  Sleep Study 2017 - (130.0 lbs) CPAP was titrated to a pressure of 12 with an AHI of 38.5.  Time Spent in REM supine at this pressure was 7.0       Health Care Home 2015     [facility]   Jamie Sharma RN  [phone]    [facility] CARE PLAN SUMMARY    Client Name:  Sonya Foote  Address:  [address] [place] [address]  [address] [place] [address] Phone: [phone] (Cell)   :  1950 Date of Assessment:  16 (at TCU)  Visit at [facility] after discharge:  16   [place]  [facility]  [place] Number: [id number] Medical Assistance Number: [id number]  Financial Worker:  [facility]  Case #:  [id number]   [facility] :  Jamie Sharma RN  Phone:  [phone]   Fax:  [phone]   [facility] Enrollment Date: 4/1/15 Case Mix:  E  Rate Cell:  B  Waiver Type:  EW   Emergency Contact:  Extended Emergency Contact Information  Primary Emergency Contact: Jayde Carranza  Mobile Phone: [phone]  Relation: Spouse  Secondary Emergency Contact: PRASHANTH CARRANZA  Home Phone: [phone]  Mobile Phone: [phone]  Relation: Daughter Language:  English  :  No   Health Care Agent/POA:  Jayde Carranza (spouse).   1st Alternate: Prashanth  "Tere (daughter)   2nd Alternate: Anahi Carranza (son) Advanced Directives/Living Will:  Yes   Primary Care Clinic/Phone/Fax:  [facility]/(p) [phone], (f) [phone] Primary Dx:  E11.51 Diabetes  Secondary Dx:  V49.76 Right AKA   Primary Physician:  Allan Casey   Height:  5' 7\"  Weight:  143 lbs   Specialty Physician:     MAPs - [place] Audiologist:  ROCÍO   Eye Care Provider:  [facility] Provider:    [facility]: [facility] [phone]   Other:  ARHMS Worker - Cary Junior (Family Support Services - [facility]) - [phone] Special Transportation ([employer]) Provider: [employer] [phone]         [facility] CURRENT SERVICES SUMMARY  Equipment owned/DME history: power chair, manual w/c, standard walker, 4 wheeled walker w/ seat; cane  Transfer tub bench, safety pole w/ super bar; drop arm commode, transfer board, talking watch, reacher   SERVICE TYPE/PROVIDER NAME/PHONE AUTH DATE FREQUENCY Units OR $ Amt DESCRIPTION   Medical Transportation: [employer] [facility] [phone] 12/1/16 - 11/30/17 as needed N/A N/A   Supplies: [employer] [phone] 12/1/16 - 11/30/17 monthly N/A Pull ups (size medium) 3-4 per day    Glucerna 2 cans/day      [employer] tickets through [employer] [facility] (EW) 12/1/16 - 11/30/17 monthly  10 rush  10 non rush     [facility] [phone]   12/19/16 RN - eval and treat      PT/OT - eval and   treat N/A      Assisted Living: Vanessa Delgado  [phone]   Fax: [phone] 24 hr Customized Living  (RN - [phone]) 12/19/16 - 12/13/17 daily See RS/AL Tool      Supplies: [facility]   [phone]  Fax: [phone]  (formerly [facility]) 12/19/16 1x order  Transfer Board (MA)    Drop Arm Commode (MA)     Supplies: [facility]   [phone]  Fax: [phone]  (formerly [facility]) 12/19/16 1x fee $65.00 1x fee Delivery charge of equip (EW)     DME: [facility] [phone]  Fax: " [phone]  [facility] (previously [place]) 1/19/17 1x order $26.90 CPAP power cord for Resmed S9 (EW)     DME: [facility] [phone]  Fax: [phone]  [facility] (previously [place]) 2/8/17 1x order $96.00 CPAP S9 90w power supply unit (EW)          Cervicalgia 01/15/2015     GIB (gastrointestinal bleeding) 08/21/2014     Claudication in peripheral vascular disease (H) 04/22/2014     Vertigo 11/08/2013     Schizoaffective disorder (H) 06/07/2013     Osteoporosis 01/21/2013     ACP (advance care planning) 09/13/2012     Advance Care Planning 7/19/2016: ACP Review of Chart / Resources Provided:  Reviewed chart for advance care plan.  Sonya Foote has an up to date advance care plan on file.  Added by Jamie Sharma  Advance Care Planning 5/5/2016: ACP Review of Chart / Resources Provided:  Reviewed chart for advance care plan.  Sonya Foote has an up to date advance care plan on file.  Added by Jamie Sharma    Patient states has Advance Directive and will bring in a copy to clinic. 9/13/2012          Hyperlipidemia LDL goal <100 09/04/2012     Seizure disorder (H) 09/04/2012     Adjustment disorder with depressed mood 09/16/2009     History of colonic polyps 07/13/2009     Overview:   Colonoscopy completed on July 2009.  See report for full detail.  Please re refer in 5 years for repeat colon cancer screening if medically appropriate.  Need colyte 4/2 preparation.       Hereditary and idiopathic peripheral neuropathy 07/10/2009     Androgen insensitivity syndrome 03/23/2009     Chronic pain syndrome 03/20/2009     Patient is followed by Allan Casey for ongoing prescription of pain meds  All refills should be approved by this provider, or covering partner.    Maximum quantity per month: 1 month  Clinic visit frequency required: Q 6  months     Controlled substance agreement:  Encounter-Level CSA:     There are no encounter-level csa.        Pain Clinic evaluation in the past:  No    DIRE Total Score(s):  No flowsheet data found.    Last [facility] website verification:  none   [email or web address]       Thoracic or lumbosacral neuritis or radiculitis 03/20/2009     Disorder of bone and cartilage 05/24/2007     Late effects of cerebrovascular disease 07/13/2006     Cerebral infarction due to occlusion or stenosis of carotid artery 02/06/2006     Problem list name updated by automated process. Provider to review       Iron deficiency anemia 11/18/2005     Degeneration of intervertebral disc of lumbosacral region 11/18/2005     Overview:   with radiculopathy       Old myocardial infarction 08/02/2005     Open-angle glaucoma 08/11/2003     Asthma 04/04/2003        /69  Pulse 85  Wt 60.3 kg (133 lb)  SpO2 96%  BMI 20.83 kg/m2    Impression/Plan:    ICD-10-CM    1. Complex sleep apnea syndrome G47.31    2. JOSE (obstructive sleep apnea) G47.33    Mild Sleep apnea.  Tolerating PAP ok. Daytime symptoms are improved on therapy. Counseled on importance of daily use of at least 4 hours.    Sonya Foote will follow up in about 1 month(s).       Fifteen minutes spent with patient, all of which were spent face-to-face counseling, consulting, coordinating plan of care.      Guanaco Kowalski MD, Sleep Physician  May 2, 2017

## 2017-05-02 NOTE — TELEPHONE ENCOUNTER
PT 1 x a week for 1 week then 2 x a week for 3 weeks. OT 1 x a day for 1 day for wheelchair safety and bathroom transfers. Approved.Kira Hare RN

## 2017-05-02 NOTE — NURSING NOTE
"Chief Complaint   Patient presents with     RECHECK     cpap f/u       Initial There were no vitals taken for this visit. Estimated body mass index is 20.83 kg/(m^2) as calculated from the following:    Height as of 1/9/17: 1.702 m (5' 7\").    Weight as of 4/26/17: 60.3 kg (133 lb).  Medication Reconciliation: complete  "

## 2017-05-02 NOTE — PROGRESS NOTES
Pt called to state AL doesn't have order for prn Cetirizine & allergies are currently bothersome.  Order had been changed from qd scheduled to qd prn.  Informed pt I would again notify AL and send order to pharmacy.    Elizabeth Rose, Pharm.D.,[facility]  Board Certified Geriatric Pharmacist  Medication Therapy Management Pharmacist  [phone]

## 2017-05-02 NOTE — MR AVS SNAPSHOT
After Visit Summary   5/2/2017    Sonya Foote    MRN: [id number]           Patient Information     Date Of Birth          1950        Visit Information        Provider Department      5/2/2017 4:00 PM Guanaco Kowalski MD [facility]        Today's Diagnoses     Complex sleep apnea syndrome        JOSE (obstructive sleep apnea)          Care Instructions      Your BMI is Body mass index is 20.83 kg/(m^2).  Weight management is a personal decision.  If you are interested in exploring weight loss strategies, the following discussion covers the approaches that may be successful. Body mass index (BMI) is one way to tell whether you are at a healthy weight, overweight, or obese. It measures your weight in relation to your height.  A BMI of 18.5 to 24.9 is in the healthy range. A person with a BMI of 25 to 29.9 is considered overweight, and someone with a BMI of 30 or greater is considered obese. More than two-thirds of American adults are considered overweight or obese.  Being overweight or obese increases the risk for further weight gain. Excess weight may lead to heart disease and diabetes.  Creating and following plans for healthy eating and physical activity may help you improve your health.  Weight control is part of healthy lifestyle and includes exercise, emotional health, and healthy eating habits. Careful eating habits lifelong are the mainstay of weight control. Though there are significant health benefits from weight loss, long-term weight loss with diet alone may be very difficult to achieve- studies show long-term success with dietary management in less than 10% of people. Attaining a healthy weight may be especially difficult to achieve in those with severe obesity. In some cases, medications, devices and surgical management might be considered.  What can you do?  If you are overweight or obese and are interested in methods for weight loss, you should discuss this  with your provider.     Consider reducing daily calorie intake by 500 calories.     Keep a food journal.     Avoiding skipping meals, consider cutting portions instead.    Diet combined with exercise helps maintain muscle while optimizing fat loss. Strength training is particularly important for building and maintaining muscle mass. Exercise helps reduce stress, increase energy, and improves fitness. Increasing exercise without diet control, however, may not burn enough calories to loose weight.       Start walking three days a week 10-20 minutes at a time    Work towards walking thirty minutes five days a week     Eventually, increase the speed of your walking for 1-2 minutes at time    In addition, we recommend that you review healthy lifestyles and methods for weight loss available through the National Institutes of Health patient information sites:  http://win.niddk.nih.gov/publications/index.htm    And look into health and wellness programs that may be available through your health insurance provider, employer, local community center, or yoandy club.    Weight management plan noted, stable and monitoring            Follow-ups after your visit        Follow-up notes from your care team     Return in about 1 month (around 6/2/2017) for PAP Compliance Check.      Your next 10 appointments already scheduled     May 08, 2017  1:00 PM CDT   Nurse Visit with UA NURSE   [facility] ([facility])    [address]  [address]  [facility] [id number] [phone]            May 10, 2017 10:30 AM CDT   MA SCREENING DIGITAL BILATERAL with OXMA1   [facility] ([facility])    [address] [id number] [phone]           Do not use any powder, lotion or deodorant under your arms or on your breast. If you do, we will ask you to remove it before your exam.  Wear comfortable, two-piece clothing.  If  you have any allergies, tell your care team.  Bring any previous mammograms from other facilities or have them mailed to the breast center.            May 12, 2017 10:50 AM CDT   (Arrive by 10:35 AM)   RETURN DIABETES with Michelle Irizarry MD   [facility] ([facility])    [address] [place] [address]  [address] [facility] [address]   [phone]            May 18, 2017  1:00 PM CDT   (Arrive by 12:45 PM)   Cystoscopy with Elizabeth Oliver MD   [facility] ([facility])    [address] [place] [address]  [address] [facility] [address]   [phone]            Jun 06, 2017 10:30 AM CDT   Return Sleep Patient with Guanaco Kowalski MD   [facility] ([facility])    [address] [place] [id number] [phone]            Jul 10, 2017 11:00 AM CDT   (Arrive by 10:45 AM)   Return Visit with Alina Jennings MD   [facility] ([facility])    [address] [place] [id number] [phone]              Who to contact     If you have questions or need follow up information about today's clinic visit or your schedule please contact [facility] directly at [phone].  Normal or non-critical lab and imaging results will be communicated to you by MyChart, letter or phone within 4 business days after the clinic has received the results. If you do not hear from us within 7 days, please contact the clinic through [employer] or phone. If you have a critical or abnormal lab result, we will notify you by phone as soon as possible.  Submit refill requests through [employer] or call your pharmacy and they will forward the refill request to us. Please allow 3 business days for your refill to be completed.          Additional Information  "About Your Visit        [employer] Information     [email or web address] lets you send messages to your doctor, view your test results, renew your prescriptions, schedule appointments and more. To sign up, go to [email or web address] . Click on \"Log in\" on the left side of the screen, which will take you to the Welcome page. Then click on \"Sign up Now\" on the right side of the page.     You will be asked to enter the access code listed below, as well as some personal information. Please follow the directions to create your username and password.     Your access code is: [email or web address]  Expires: 2017 11:52 AM     Your access code will  in 90 days. If you need help or a new code, please call your [facility] clinic or [phone].        Care EveryWhere ID     This is your Care EveryWhere ID. This could be used by other organizations to access your [facility] medical records  [id number]        Your Vitals Were     Pulse Pulse Oximetry BMI (Body Mass Index)             85 96% 20.83 kg/m2          Blood Pressure from Last 3 Encounters:   17 131/69   17 147/83   17 146/74    Weight from Last 3 Encounters:   17 60.3 kg (133 lb)   17 60.3 kg (133 lb)   17 60.3 kg (133 lb)              Today, you had the following     No orders found for display         Today's Medication Changes          These changes are accurate as of: 17 11:59 PM.  If you have any questions, ask your nurse or doctor.               Start taking these medicines.        Dose/Directions    cetirizine 10 MG tablet   Commonly known as:  zyrTEC   Used for:  Seasonal allergic rhinitis, unspecified allergic rhinitis trigger   Started by:  Elizabeth Rose RPH        Dose:  10 mg   Take 1 tablet (10 mg) by mouth daily as needed for allergies   Quantity:  90 tablet   Refills:  3         These medicines have changed or have updated prescriptions.        Dose/Directions    atorvastatin 40 MG tablet   Commonly known as:  LIPITOR   This may " have changed:  when to take this   Used for:  Hyperlipidemia LDL goal <100        Dose:  40 mg   Take 1 tablet (40 mg) by mouth daily   Quantity:  90 tablet   Refills:  3       clopidogrel 75 MG tablet   Commonly known as:  PLAVIX   This may have changed:  when to take this   Used for:  PAD (peripheral artery disease) (H), UGI bleed, Osteoporosis, Nausea        Dose:  75 mg   Take 1 tablet (75 mg) by mouth daily   Quantity:  90 tablet   Refills:  3       ondansetron 4 MG ODT tab   Commonly known as:  ZOFRAN-ODT   This may have changed:    - when to take this  - reasons to take this   Used for:  Intractable vomiting with nausea, unspecified vomiting type        Dose:  4 mg   Take 1 tablet (4 mg) by mouth every 6 hours   Quantity:  120 tablet   Refills:  0       Phenytoin Sodium Extended 200 MG Caps   This may have changed:  additional instructions   Used for:  Seizure disorder (H)        Dose:  200 mg   Take 200 mg by mouth 2 times daily   Quantity:  60 capsule   Refills:  0            Where to get your medicines      These medications were sent to [address] [address]     Phone:  [phone]     cetirizine 10 MG tablet                Primary Care Provider Office Phone # Fax #    Allan Casey [phone]       [facility] [address] [place] [phone]        Thank you!     Thank you for choosing [facility]  for your care. Our goal is always to provide you with excellent care. Hearing back from our patients is one way we can continue to improve our services. Please take a few minutes to complete the written survey that you may receive in the mail after your visit with us. Thank you!             Your Updated Medication List - Protect others around you: Learn how to safely use, store and throw away your medicines at www.disposemymeds.org.          This  list is accurate as of: 5/2/17 11:59 PM.  Always use your most recent med list.                   Brand Name Dispense Instructions for use    ADVAIR DISKUS 250-50 MCG/DOSE diskus inhaler   Generic drug:  fluticasone-salmeterol      Inhale 1 puff into the lungs 2 times daily       * albuterol (2.5 MG/3ML) 0.083% neb solution     360 mL    INHALE 1 VIAL VIA NEBULIZER EVERY 6 HOURS AS NEEDED       * albuterol 108 (90 BASE) MCG/ACT Inhaler    VENTOLIN HFA    1 Inhaler    Inhale 1-2 puffs into the lungs every 6 hours as needed       aspirin 81 MG EC tablet     90 tablet    Take 1 tablet (81 mg) by mouth daily       atorvastatin 40 MG tablet    LIPITOR    90 tablet    Take 1 tablet (40 mg) by mouth daily       blood glucose monitoring meter device kit     1 kit    Use to test blood sugars 3 times daily or as directed.       blood glucose monitoring test strip    ONE TOUCH ULTRA    3 Box    Use to test blood sugars 3 times daily or as directed.       calcium citrate-vitamin D 315-250 MG-UNIT Tabs per tablet    CITRACAL    120 tablet    Take 2 tablets by mouth daily       cetirizine 10 MG tablet    zyrTEC    90 tablet    Take 1 tablet (10 mg) by mouth daily as needed for allergies       clopidogrel 75 MG tablet    PLAVIX    90 tablet    Take 1 tablet (75 mg) by mouth daily       CYANOCOBALAMIN PO      Take 2,000 mcg by mouth daily       diclofenac 1 % Gel topical gel    VOLTAREN    100 g    Apply 2 g topically 4 times daily to hands       dorzolamide 2 % ophthalmic solution    TRUSOPT     Place 1 drop into both eyes 2 times daily       * EASY TOUCH LANCETS 30G/TWIST Misc          * thin lancets    NO BRAND SPECIFIED    1 Box    Use to test blood sugar 3 times daily or as directed.       * blood glucose monitoring lancets     3 Box    Use to test blood sugar 3 times daily or as directed.       EPINEPHrine 0.15 MG/0.3ML injection    EPIPEN JR    0.6 mL    Inject 0.3 mLs (0.15 mg) into the muscle as needed for anaphylaxis        escitalopram 10 MG tablet    LEXAPRO    90 tablet    Take 1 tablet (10 mg) by mouth daily       estradiol 1 MG tablet    ESTRACE    90 tablet    Take 1 tablet (1 mg) by mouth daily       ferrous sulfate 325 (65 FE) MG tablet    IRON    60 tablet    Take 1 tablet (325 mg) by mouth 2 times daily With meals       fluticasone 50 MCG/ACT spray    FLONASE     Spray 1 spray into both nostrils daily       hydrochlorothiazide 12.5 MG capsule    MICROZIDE    90 capsule    Take 1 capsule (12.5 mg) by mouth daily       HYDROmorphone 2 MG tablet    DILAUDID    30 tablet    Take 1 tablet (2 mg) by mouth every 3 hours as needed for moderate to severe pain       RENÉE Pack      Take 1 packet by mouth 2 times daily       latanoprost 0.005 % ophthalmic solution    XALATAN    2.5 mL    Place 1 drop into both eyes At Bedtime       levETIRAcetam 500 MG tablet    KEPPRA    180 tablet    Take 1 tablet (500 mg) by mouth 2 times daily       lidocaine 5 % Patch    LIDODERM    30 patch    Apply from 1 to 3 patches to painful area at once for up to 12 h within a 24 h period.  Remove after 12 hours.       lisinopril 10 MG tablet    PRINIVIL/ZESTRIL    90 tablet    Take 1 tablet (10 mg) by mouth daily       LYRICA 100 MG capsule   Generic drug:  pregabalin      Take 100 mg by mouth 2 times daily       MEDICATION GIVEN BY INTRATHECAL PUMP - INSTRUCTION      continuous 4/11/2016 per [facility] [place] [phone]: Conc: Bupivacaine 20 mg/mL and Fentanyl 2000 mcg/mL. Continuous: Bupivacaine 5.052 mg/day and Fentanyl 505.2 mcg/day. Boluses: Up to 7 boluses per 24-hr period of Bupivicaine 0.5992 mg and Fentanyl 59.9 mcg Max daily dose: Bupivacaine 9.1973 mg/day and Fentanyl 919.5883 mcg/day  Pump Last Fill Date:  6/30/2016 Pump Refill Date: 9/20/2016       metoprolol 25 MG 24 hr tablet    TOPROL-XL    30 tablet    Take 1 tablet (25 mg) by mouth daily       MULTIVITAMIN PO      Take 1 tablet by mouth daily        nitroglycerin 0.4 MG sublingual tablet    NITROSTAT    25 tablet    Place 1 tablet (0.4 mg) under the tongue every 5 minutes as needed for chest pain if you are still having symptoms after 3 doses (15 minutes) call 911.       ondansetron 4 MG ODT tab    ZOFRAN-ODT    120 tablet    Take 1 tablet (4 mg) by mouth every 6 hours       * order for DME     1 Month    Equipment being ordered: Depends briefs       * order for DME     1 Device    Equipment being ordered: Power Wheelchair       * order for DME     1 Units    Equipment being ordered: Nebulizer and supplies       * order for DME     1 Box    Equipment being ordered: Glucerna daily shakes with each meal       * order for DME     1 Device    ACcu check BID       * order for DME     1 Units    Equipment being ordered: Nebulizer and tubing supplies       * order for DME     1 Device    Equipment being ordered: CPAP supplies mask, hose, filters, cushion fax to [facility] at [phone] Disp: 10 DeviceRefills: prn Class: Local PrintStart: 2/10/2017       * order for DME     10 Device    Equipment being ordered: CPAP supplies mask, hose, filters, cushion  fax to [facility] at [phone]       * order for DME     1 Device    Equipment being ordered: wheelchair seat cushion       pantoprazole 40 MG EC tablet    PROTONIX     Take 40 mg by mouth daily       Phenytoin Sodium Extended 200 MG Caps     60 capsule    Take 200 mg by mouth 2 times daily       polyethylene glycol powder    MIRALAX/GLYCOLAX     Take 17 g by mouth daily       prochlorperazine 10 MG tablet    COMPAZINE    20 tablet    Take 1 tablet (10 mg) by mouth every 8 hours as needed       risperiDONE 0.5 MG tablet    risperDAL     Take 0.5 mg by mouth At Bedtime       SPIRIVA HANDIHALER 18 MCG capsule   Generic drug:  tiotropium     30 capsule    INHALE THE CONTENTS OF 1 CAPSULE VIA INHALATION DEVICE DAILY       sucralfate 1 GM tablet    CARAFATE    40 tablet    Take 1 tablet (1 g) by mouth 4 times  daily May dissolve in 10 mL water is necessary. (Start upon completion of carafate suspension)       traZODone 100 MG tablet    DESYREL    90 tablet    Take 1 tablet (100 mg) by mouth At Bedtime       vitamin D 2000 UNITS tablet     100 tablet    Take 2,000 Units by mouth daily.       zinc 50 MG Tabs      Take 1 tablet by mouth daily       * Notice:  This list has 14 medication(s) that are the same as other medications prescribed for you. Read the directions carefully, and ask your doctor or other care provider to review them with you.

## 2017-05-03 ENCOUNTER — CARE COORDINATION (OUTPATIENT)
Dept: GERIATRIC MEDICINE | Facility: CLINIC | Age: 67
End: 2017-05-03

## 2017-05-03 NOTE — LETTER
[address] [address]  [phone]      2017     To whomsoever it may concern,    Kindly check Ms Sonya Foote  - 1950 Blood pressure every morning.        Sincerely,        Allan Casey MD  Internal Medicine

## 2017-05-03 NOTE — PROGRESS NOTES
5/3/17: ELVIRA received call back from Jovana at [facility]  - She was also with Crystal. Jovana states that they will update member's rent to $791.   ELVIRA spoke with member and relayed this information to her.     Jamie Sharma RN, N  [employer]

## 2017-05-03 NOTE — PROGRESS NOTES
HI Dr. Casey - I am unsure either - I don't place orders in Epic so am unsure how to do  - can you sign an Rx and have someone fax to the AL?   Thank you!  Jamie Sharma RN, N  [facility]

## 2017-05-03 NOTE — PROGRESS NOTES
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  The AL staff is telling her they do not have an order - Can you please submit a new order to [facility] nursing dept.  Their fax # is: [phone].    Thank you,   Jamie Sharma RN, N  [facility]

## 2017-05-04 ENCOUNTER — CARE COORDINATION (OUTPATIENT)
Dept: GERIATRIC MEDICINE | Facility: CLINIC | Age: 67
End: 2017-05-04

## 2017-05-04 NOTE — PROGRESS NOTES
5/4/17: Reviewed faxed copy of letter from [employer] that member sent to .   Total on letter shows $789.67 however no itemized list or list at all on letter.   On 4/28, Liliane Love in billing at [employer] had just stated to  that list was only 6 medications which does not total the $789.67. ELVIRA placed a call to [employer] collections dept at [phone] and spoke with Malia.  After discussion and trying to figure out all of the items involved, Malia realized that member has MA and states that they did not know this.  She stated they were billing incorrectly and need to rebill according to Malia the will rebill and hopefully will then by a $0 balance.      notified member and Karen Escobedo at [facility].     Jamie Sharma RN, PHN  [facility]

## 2017-05-08 ENCOUNTER — ALLIED HEALTH/NURSE VISIT (OUTPATIENT)
Dept: UROLOGY | Facility: CLINIC | Age: 67
End: 2017-05-08
Payer: COMMERCIAL

## 2017-05-08 DIAGNOSIS — Z87.440 H/O URINARY TRACT INFECTION: Primary | ICD-10-CM

## 2017-05-08 LAB
ALBUMIN UR-MCNC: NEGATIVE MG/DL
APPEARANCE UR: CLEAR
BILIRUB UR QL STRIP: NEGATIVE
COLOR UR AUTO: YELLOW
GLUCOSE UR STRIP-MCNC: NEGATIVE MG/DL
HGB UR QL STRIP: NEGATIVE
KETONES UR STRIP-MCNC: NEGATIVE MG/DL
LEUKOCYTE ESTERASE UR QL STRIP: NEGATIVE
NITRATE UR QL: NEGATIVE
PH UR STRIP: 7 PH (ref 5–7)
SP GR UR STRIP: 1.01 (ref 1–1.03)
URN SPEC COLLECT METH UR: NORMAL
UROBILINOGEN UR STRIP-ACNC: 0.2 EU/DL (ref 0.2–1)

## 2017-05-08 PROCEDURE — 87086 URINE CULTURE/COLONY COUNT: CPT | Performed by: UROLOGY

## 2017-05-08 PROCEDURE — 99211 OFF/OP EST MAY X REQ PHY/QHP: CPT | Performed by: UROLOGY

## 2017-05-08 PROCEDURE — 81003 URINALYSIS AUTO W/O SCOPE: CPT | Performed by: UROLOGY

## 2017-05-08 NOTE — PROGRESS NOTES
Patient presents to clinic for UAUC to r/o infection prior to procedure. Reports no symptoms at present. Patient prepped, draped, and cleansed with Iodine in sterile fashion. Patient was positioned in a reclining position with chucks pad placed underneath. Using sterile field technique a sterile, well-lubricated 14 Fr catheter was gently inserted with ease x1 attempt. No discomfort voiced by pt., clear-yellow urine return noted with 200 ml drained from catheter.  UA performed-UA RESULTS:UA RESULTS:  Recent Labs   Lab Test  05/08/17   1249  04/19/17   1630   COLOR  Yellow  Light Yellow   APPEARANCE  Clear  Clear   URINEGLC  Negative  Negative   URINEBILI  Negative  Negative   URINEKETONE  Negative  Negative   SG  1.015  1.011   UBLD  Negative  Negative   URINEPH  7.0  6.5   PROTEIN  Negative  Negative   UROBILINOGEN  0.2   --    NITRITE  Negative  Negative   LEUKEST  Negative  Negative   RBCU   --   6*   WBCU   --   8*     Will wait for culture to treat.  JOSE LUIS Solomon comes into clinic today at the request of Dr. Oliver Ordering Provider for Dr. Oliver.        This service provided today was under the supervising provider of the day Dr. Malik, who was available if needed.    Monae Arriaga

## 2017-05-08 NOTE — MR AVS SNAPSHOT
After Visit Summary   5/8/2017    Sonya Foote    MRN: [id number]           Patient Information     Date Of Birth          1950        Visit Information        Provider Department      5/8/2017 1:00 PM UA NURSE [facility] [place]        Today's Diagnoses     H/O urinary tract infection    -  1       Follow-ups after your visit        Your next 10 appointments already scheduled     May 10, 2017 10:30 AM CDT   MA SCREENING DIGITAL BILATERAL with OXMA1   [facility] ([facility])    [address] [id number] [phone]           Do not use any powder, lotion or deodorant under your arms or on your breast. If you do, we will ask you to remove it before your exam.  Wear comfortable, two-piece clothing.  If you have any allergies, tell your care team.  Bring any previous mammograms from other facilities or have them mailed to the breast center.            May 12, 2017 10:50 AM CDT   (Arrive by 10:35 AM)   RETURN DIABETES with Michelle Irizarry MD   [facility] ([facility])    [address] [place] [id number] [phone]            May 18, 2017  1:00 PM CDT   (Arrive by 12:45 PM)   Cystoscopy with Elizabeth Oliver MD   [facility] ([facility])    [address] [place] [id number] [phone]            Jun 06, 2017 10:30 AM CDT   Return Sleep Patient with Guanaco Kowalski MD   [facility] ([facility])    [address] [place] [id number]   [phone]            Jul 10, 2017 11:00 AM CDT   (Arrive by 10:45 AM)   Return Visit with Alina Jennings MD   [facility] ([facility])     "[address] [place] [address] [phone]              Who to contact     If you have questions or need follow up information about today's clinic visit or your schedule please contact [facility] [place] directly at [phone].  Normal or non-critical lab and imaging results will be communicated to you by MyChart, letter or phone within 4 business days after the clinic has received the results. If you do not hear from us within 7 days, please contact the clinic through MyChart or phone. If you have a critical or abnormal lab result, we will notify you by phone as soon as possible.  Submit refill requests through Nam[facility] or call your pharmacy and they will forward the refill request to us. Please allow 3 business days for your refill to be completed.          Additional Information About Your Visit        MyChart Information     Namshi lets you send messages to your doctor, view your test results, renew your prescriptions, schedule appointments and more. To sign up, go to [email or web address] . Click on \"Log in\" on the left side of the screen, which will take you to the Welcome page. Then click on \"Sign up Now\" on the right side of the page.     You will be asked to enter the access code listed below, as well as some personal information. Please follow the directions to create your username and password.     Your access code is: [email or web address]  Expires: 2017 11:52 AM     Your access code will  in 90 days. If you need help or a new code, please call your [facility] clinic or [phone].        Care EveryWhere ID     This is your Care EveryWhere ID. This could be used by other organizations to access your [facility] medical records  [id number]         Blood Pressure from Last 3 Encounters:   17 131/69   17 147/83   17 146/74    Weight from Last 3 Encounters:   17 60.3 kg (133 lb)   17 60.3 kg (133 lb) "   04/19/17 60.3 kg (133 lb)              We Performed the Following     UA without Microscopic     Urine Culture Aerobic Bacterial [YRM607]          Today's Medication Changes          These changes are accurate as of: 5/8/17  1:00 PM.  If you have any questions, ask your nurse or doctor.               These medicines have changed or have updated prescriptions.        Dose/Directions    atorvastatin 40 MG tablet   Commonly known as:  LIPITOR   This may have changed:  when to take this   Used for:  Hyperlipidemia LDL goal <100        Dose:  40 mg   Take 1 tablet (40 mg) by mouth daily   Quantity:  90 tablet   Refills:  3       clopidogrel 75 MG tablet   Commonly known as:  PLAVIX   This may have changed:  when to take this   Used for:  PAD (peripheral artery disease) (H), UGI bleed, Osteoporosis, Nausea        Dose:  75 mg   Take 1 tablet (75 mg) by mouth daily   Quantity:  90 tablet   Refills:  3       ondansetron 4 MG ODT tab   Commonly known as:  ZOFRAN-ODT   This may have changed:    - when to take this  - reasons to take this   Used for:  Intractable vomiting with nausea, unspecified vomiting type        Dose:  4 mg   Take 1 tablet (4 mg) by mouth every 6 hours   Quantity:  120 tablet   Refills:  0       Phenytoin Sodium Extended 200 MG Caps   This may have changed:  additional instructions   Used for:  Seizure disorder (H)        Dose:  200 mg   Take 200 mg by mouth 2 times daily   Quantity:  60 capsule   Refills:  0                Primary Care Provider Office Phone # Fax #    Allan Casey [phone]       [facility] [address] [place] [id number]        Thank you!     Thank you for choosing [facility] [place]  for your care. Our goal is always to provide you with excellent care. Hearing back from our patients is one way we can continue to improve our services. Please take a few minutes to complete the written survey that you may  receive in the mail after your visit with us. Thank you!             Your Updated Medication List - Protect others around you: Learn how to safely use, store and throw away your medicines at www.disposemymeds.org.          This list is accurate as of: 5/8/17  1:00 PM.  Always use your most recent med list.                   Brand Name Dispense Instructions for use    ADVAIR DISKUS 250-50 MCG/DOSE diskus inhaler   Generic drug:  fluticasone-salmeterol      Inhale 1 puff into the lungs 2 times daily       * albuterol (2.5 MG/3ML) 0.083% neb solution     360 mL    INHALE 1 VIAL VIA NEBULIZER EVERY 6 HOURS AS NEEDED       * albuterol 108 (90 BASE) MCG/ACT Inhaler    VENTOLIN HFA    1 Inhaler    Inhale 1-2 puffs into the lungs every 6 hours as needed       aspirin 81 MG EC tablet     90 tablet    Take 1 tablet (81 mg) by mouth daily       atorvastatin 40 MG tablet    LIPITOR    90 tablet    Take 1 tablet (40 mg) by mouth daily       blood glucose monitoring meter device kit     1 kit    Use to test blood sugars 3 times daily or as directed.       blood glucose monitoring test strip    ONE TOUCH ULTRA    3 Box    Use to test blood sugars 3 times daily or as directed.       calcium citrate-vitamin D 315-250 MG-UNIT Tabs per tablet    CITRACAL    120 tablet    Take 2 tablets by mouth daily       cetirizine 10 MG tablet    zyrTEC    90 tablet    Take 1 tablet (10 mg) by mouth daily as needed for allergies       clopidogrel 75 MG tablet    PLAVIX    90 tablet    Take 1 tablet (75 mg) by mouth daily       CYANOCOBALAMIN PO      Take 2,000 mcg by mouth daily       diclofenac 1 % Gel topical gel    VOLTAREN    100 g    Apply 2 g topically 4 times daily to hands       dorzolamide 2 % ophthalmic solution    TRUSOPT     Place 1 drop into both eyes 2 times daily       * EASY TOUCH LANCETS 30G/TWIST Misc          * thin lancets    NO BRAND SPECIFIED    1 Box    Use to test blood sugar 3 times daily or as directed.       * blood  glucose monitoring lancets     3 Box    Use to test blood sugar 3 times daily or as directed.       EPINEPHrine 0.15 MG/0.3ML injection    EPIPEN JR    0.6 mL    Inject 0.3 mLs (0.15 mg) into the muscle as needed for anaphylaxis       escitalopram 10 MG tablet    LEXAPRO    90 tablet    Take 1 tablet (10 mg) by mouth daily       estradiol 1 MG tablet    ESTRACE    90 tablet    Take 1 tablet (1 mg) by mouth daily       ferrous sulfate 325 (65 FE) MG tablet    IRON    60 tablet    Take 1 tablet (325 mg) by mouth 2 times daily With meals       fluticasone 50 MCG/ACT spray    FLONASE     Spray 1 spray into both nostrils daily       hydrochlorothiazide 12.5 MG capsule    MICROZIDE    90 capsule    Take 1 capsule (12.5 mg) by mouth daily       HYDROmorphone 2 MG tablet    DILAUDID    30 tablet    Take 1 tablet (2 mg) by mouth every 3 hours as needed for moderate to severe pain       RENÉE Pack      Take 1 packet by mouth 2 times daily       latanoprost 0.005 % ophthalmic solution    XALATAN    2.5 mL    Place 1 drop into both eyes At Bedtime       levETIRAcetam 500 MG tablet    KEPPRA    180 tablet    Take 1 tablet (500 mg) by mouth 2 times daily       lidocaine 5 % Patch    LIDODERM    30 patch    Apply from 1 to 3 patches to painful area at once for up to 12 h within a 24 h period.  Remove after 12 hours.       lisinopril 10 MG tablet    PRINIVIL/ZESTRIL    90 tablet    Take 1 tablet (10 mg) by mouth daily       LYRICA 100 MG capsule   Generic drug:  pregabalin      Take 100 mg by mouth 2 times daily       MEDICATION GIVEN BY INTRATHECAL PUMP - INSTRUCTION      continuous 4/11/2016 per [facility] [place] [phone]: Conc: Bupivacaine 20 mg/mL and Fentanyl 2000 mcg/mL. Continuous: Bupivacaine 5.052 mg/day and Fentanyl 505.2 mcg/day. Boluses: Up to 7 boluses per 24-hr period of Bupivicaine 0.5992 mg and Fentanyl 59.9 mcg Max daily dose: Bupivacaine 9.1973 mg/day and Fentanyl 919.5883  mcg/day  Pump Last Fill Date:  6/30/2016 Pump Refill Date: 9/20/2016       metoprolol 25 MG 24 hr tablet    TOPROL-XL    30 tablet    Take 1 tablet (25 mg) by mouth daily       MULTIVITAMIN PO      Take 1 tablet by mouth daily       nitroglycerin 0.4 MG sublingual tablet    NITROSTAT    25 tablet    Place 1 tablet (0.4 mg) under the tongue every 5 minutes as needed for chest pain if you are still having symptoms after 3 doses (15 minutes) call 911.       ondansetron 4 MG ODT tab    ZOFRAN-ODT    120 tablet    Take 1 tablet (4 mg) by mouth every 6 hours       * order for DME     1 Month    Equipment being ordered: Depends briefs       * order for DME     1 Device    Equipment being ordered: Power Wheelchair       * order for DME     1 Units    Equipment being ordered: Nebulizer and supplies       * order for DME     1 Box    Equipment being ordered: Glucerna daily shakes with each meal       * order for DME     1 Device    ACcu check BID       * order for DME     1 Units    Equipment being ordered: Nebulizer and tubing supplies       * order for DME     1 Device    Equipment being ordered: CPAP supplies mask, hose, filters, cushion fax to [facility] at [phone] Disp: 10 DeviceRefills: prn Class: Local PrintStart: 2/10/2017       * order for DME     10 Device    Equipment being ordered: CPAP supplies mask, hose, filters, cushion  fax to [facility] at [phone]       * order for DME     1 Device    Equipment being ordered: wheelchair seat cushion       pantoprazole 40 MG EC tablet    PROTONIX     Take 40 mg by mouth daily       Phenytoin Sodium Extended 200 MG Caps     60 capsule    Take 200 mg by mouth 2 times daily       polyethylene glycol powder    MIRALAX/GLYCOLAX     Take 17 g by mouth daily       prochlorperazine 10 MG tablet    COMPAZINE    20 tablet    Take 1 tablet (10 mg) by mouth every 8 hours as needed       risperiDONE 0.5 MG tablet    risperDAL     Take 0.5 mg by mouth At Bedtime        SPIRIVA HANDIHALER 18 MCG capsule   Generic drug:  tiotropium     30 capsule    INHALE THE CONTENTS OF 1 CAPSULE VIA INHALATION DEVICE DAILY       sucralfate 1 GM tablet    CARAFATE    40 tablet    Take 1 tablet (1 g) by mouth 4 times daily May dissolve in 10 mL water is necessary. (Start upon completion of carafate suspension)       traZODone 100 MG tablet    DESYREL    90 tablet    Take 1 tablet (100 mg) by mouth At Bedtime       vitamin D 2000 UNITS tablet     100 tablet    Take 2,000 Units by mouth daily.       zinc 50 MG Tabs      Take 1 tablet by mouth daily       * Notice:  This list has 14 medication(s) that are the same as other medications prescribed for you. Read the directions carefully, and ask your doctor or other care provider to review them with you.

## 2017-05-09 ENCOUNTER — CARE COORDINATION (OUTPATIENT)
Dept: GERIATRIC MEDICINE | Facility: CLINIC | Age: 67
End: 2017-05-09

## 2017-05-09 DIAGNOSIS — Z76.89 HEALTH CARE HOME: Chronic | ICD-10-CM

## 2017-05-09 LAB
BACTERIA SPEC CULT: NO GROWTH
Lab: NORMAL
MICRO REPORT STATUS: NORMAL
SPECIMEN SOURCE: NORMAL

## 2017-05-09 NOTE — PROGRESS NOTES
5/9/17: CM received call from Fátima OT with [facility] stating that member is having more difficult hearing with hearing aids and they are not working for her - member would benefit from a pocket talker.   CM placed call to order with [facility] - spoke with Nalini - ordered pocket talker + accessories ( and overhead head set).  Member preferred overhead head set vs. Ear buds.  This will be under EW.   Received quote via email from Nalini.   Will update CPS and send to CMS for auth.     Fátima also inquired on commode for member vs the drop arm commode - CM explained to [facility] that regular commode was ordered in 10/2016 from [facility] - member should still have.  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  Fátima will f/u with member re: commode.     ELVIRA spoke with member - she states that Nursing staff at [facility] state they have not received fax form PCP office in regards to morning BP checks.  Cm can view letter in Epic - will fax to [facility] - confirmed fax # again at [phone].  Letter faxed.

## 2017-05-09 NOTE — PROGRESS NOTES
SUBJECTIVE:                                                    Sonya Foote is a 67 year old female who presents to clinic today for the following health issues:    Neck Pain that is new and different from prior 4/2017 fall.      Duration: 2 week     Description:  Location: R side of neck   Radiation: none    Intensity:  moderate    Accompanying signs and symptoms: Fell 4/6/17- states still feels bump on R side of head since.     History (similar episodes/previous evaluation): CT done 4/12/17 after fall     Precipitating or alleviating factors: Noted that this is a separate issue from her prior fall and noted that 2 weeks ago, with inset of current symptoms, she started an upper back/neck/body exercise program that included neck exercises likely precipitating current complaints.    Therapies tried and outcome: Ice, heat- intermittent relief        Problem list and histories reviewed & adjusted, as indicated.  Additional history: as documented    Patient Active Problem List   Diagnosis     Hyperlipidemia LDL goal <100     Seizure disorder (H)     ACP (advance care planning)     Osteoporosis     Schizoaffective disorder (H)     AS (sickle cell trait) (H)     Vertigo     Person who has had sex change operation     Claudication in peripheral vascular disease (H)     Intestinal malabsorption     GIB (gastrointestinal bleeding)     Cervicalgia     Health Care Home     Asthma     Adjustment disorder with depressed mood     Chronic pain syndrome     Open-angle glaucoma     History of colonic polyps     Old myocardial infarction     Iron deficiency anemia     Late effects of cerebrovascular disease     Degeneration of intervertebral disc of lumbosacral region     Thoracic or lumbosacral neuritis or radiculitis     Cerebral infarction due to occlusion or stenosis of carotid artery     Disorder of bone and cartilage     Hereditary and idiopathic peripheral neuropathy     Androgen insensitivity syndrome     PAD (peripheral  artery disease) (H)     Chronic systolic congestive heart failure (H)     Carotid stenosis, left     Primary osteoarthritis of both hands     Pain in both upper extremities     Atherosclerotic peripheral vascular disease with rest pain (H)     Essential hypertension with goal blood pressure less than 130/80     Cellulitis of right ankle     Angina pectoris, crescendo (H)     Type 2 diabetes mellitus with diabetic peripheral angiopathy without gangrene, without long-term current use of insulin (H)     Anemia, unspecified type     Critical lower limb ischemia     Testicular feminization     Anxiety disorder due to general medical condition     Anxiety disorder     Central retinal artery occlusion     Lumbosacral radiculitis     Peripheral nerve disease (H)     Osteopenia     Prediabetes     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 (H)     Primary open angle glaucoma of both eyes, severe stage     Pseudophakia of right eye     Cataract, left eye     Diabetes mellitus type 2 without retinopathy (H)     Pyelonephritis     Chronic obstructive pulmonary disease, unspecified COPD type (H)     JOSE (obstructive sleep apnea)     Complex sleep apnea syndrome     Coronary artery disease of native artery of native heart with stable angina pectoris (H)     Hyperlipidemia LDL goal <70     Ischemic cardiomyopathy     Bee sting reaction, undetermined intent, subsequent encounter     Past Surgical History:   Procedure Laterality Date     AMPUTATE LEG ABOVE KNEE Left 6/11/2016    Procedure: AMPUTATE LEG ABOVE KNEE;  Surgeon: Mello Rodriguez MD;  [facility]     AMPUTATE LEG BELOW KNEE Right 11/7/2016    Procedure: AMPUTATE LEG BELOW KNEE;  Surgeon: Savannah Durant MD;  [facility]     AMPUTATE REVISION STUMP LOWER EXTREMITY Right 11/11/2016    Procedure: AMPUTATE REVISION STUMP LOWER EXTREMITY;  Surgeon: Savannah Durant MD;  [facility]     AMPUTATE REVISION STUMP LOWER EXTREMITY Right 11/16/2016     Procedure: AMPUTATE REVISION STUMP LOWER EXTREMITY;  Surgeon: Savannah Durant MD;  [facility]     AMPUTATE TOE(S) Right 1/5/2016    Procedure: AMPUTATE TOE(S);  Surgeon: Mello Gaines DPM;  [facility]     ANGIOGRAM Bilateral 11/21/2014    Procedure: ANGIOGRAM;  Surgeon: Savannah Durant MD;  [facility]     ANGIOGRAM Left 1/16/2015    Procedure: ANGIOGRAM;  Surgeon: Savannah Durant MD;  [facility]     ANGIOGRAM Bilateral 9/14/2015    Procedure: ANGIOGRAM;  Surgeon: Savannah Durant MD;  [facility]     ANGIOGRAM Left 10/12/2015    Procedure: ANGIOGRAM;  Surgeon: Savannah Durant MD;  [facility]     ANGIOGRAM Right 6/6/2016    Procedure: ANGIOGRAM;  Surgeon: Savannah Durant MD;  [facility]     ANGIOPLASTY Right 6/6/2016    Procedure: ANGIOPLASTY;  Surgeon: Savannah Durant MD;  [facility]     APPENDECTOMY       BREAST SURGERY      right breast bx (benign)     CHOLECYSTECTOMY       COLONOSCOPY N/A 8/25/2014    Procedure: COLONOSCOPY;  Surgeon: Mello Ferrer MD;  [facility]     COLONOSCOPY WITH CO2 INSUFFLATION N/A 8/20/2014    Procedure: COLONOSCOPY WITH CO2 INSUFFLATION;  Surgeon: Duane, William Charles, MD;  [facility]     ENDARTERECTOMY FEMORAL  5/23/2014    Procedure: ENDARTERECTOMY FEMORAL;  Surgeon: Jason Joshi MD;  [facility]     ESOPHAGOSCOPY, GASTROSCOPY, DUODENOSCOPY (EGD), COMBINED  12/14/2012    Procedure: COMBINED ESOPHAGOSCOPY, GASTROSCOPY, DUODENOSCOPY (EGD), BIOPSY SINGLE OR MULTIPLE;  ESOPHAGOSCOPY, GASTROSCOPY, DUODENOSCOPY (EGD), DILATATION ;  Surgeon: Elizabeth Stevenson MD;  Location:  GI     ESOPHAGOSCOPY, GASTROSCOPY, DUODENOSCOPY (EGD), COMBINED  12/31/2013    Procedure: COMBINED ESOPHAGOSCOPY, GASTROSCOPY, DUODENOSCOPY (EGD), BIOPSY SINGLE OR MULTIPLE;;  Surgeon: Clemente Lopez MD;  [facility]     ESOPHAGOSCOPY, GASTROSCOPY, DUODENOSCOPY (EGD), COMBINED  4/1/2014    Procedure: COMBINED ESOPHAGOSCOPY, GASTROSCOPY, DUODENOSCOPY (EGD);;   Surgeon: Clemente Lopez MD;  [facility]     ESOPHAGOSCOPY, GASTROSCOPY, DUODENOSCOPY (EGD), COMBINED  6/28/2014    Procedure: COMBINED ESOPHAGOSCOPY, GASTROSCOPY, DUODENOSCOPY (EGD);  Surgeon: Clemente Lopez MD;  [facility]     ESOPHAGOSCOPY, GASTROSCOPY, DUODENOSCOPY (EGD), COMBINED N/A 8/20/2014    Procedure: COMBINED ESOPHAGOSCOPY, GASTROSCOPY, DUODENOSCOPY (EGD), BIOPSY SINGLE OR MULTIPLE;  Surgeon: Duane, William Charles, MD;  Location:  OR     ESOPHAGOSCOPY, GASTROSCOPY, DUODENOSCOPY (EGD), COMBINED N/A 8/22/2014    Procedure: COMBINED ESOPHAGOSCOPY, GASTROSCOPY, DUODENOSCOPY (EGD), BIOPSY SINGLE OR MULTIPLE;  Surgeon: Mello Ferrer MD;  [facility]     ESOPHAGOSCOPY, GASTROSCOPY, DUODENOSCOPY (EGD), COMBINED N/A 10/2/2014    Procedure: COMBINED ESOPHAGOSCOPY, GASTROSCOPY, DUODENOSCOPY (EGD), BIOPSY SINGLE OR MULTIPLE;  Surgeon: Remy Haskins MD;  [facility]     ESOPHAGOSCOPY, GASTROSCOPY, DUODENOSCOPY (EGD), COMBINED Left 12/15/2014    Procedure: COMBINED ESOPHAGOSCOPY, GASTROSCOPY, DUODENOSCOPY (EGD), BIOPSY SINGLE OR MULTIPLE;  Surgeon: Remy Haskins MD;  [facility]     ESOPHAGOSCOPY, GASTROSCOPY, DUODENOSCOPY (EGD), COMBINED N/A 2/25/2015    Procedure: COMBINED ENDOSCOPIC ULTRASOUND, ESOPHAGOSCOPY, GASTROSCOPY, DUODENOSCOPY (EGD), FINE NEEDLE ASPIRATE/BIOPSY;  Surgeon: Clemente Lugo MD;  [facility]     ESOPHAGOSCOPY, GASTROSCOPY, DUODENOSCOPY (EGD), COMBINED Left 2/25/2015    Procedure: COMBINED ESOPHAGOSCOPY, GASTROSCOPY, DUODENOSCOPY (EGD), BIOPSY SINGLE OR MULTIPLE;  Surgeon: Clemente Lugo MD;  [facility]     ESOPHAGOSCOPY, GASTROSCOPY, DUODENOSCOPY (EGD), COMBINED N/A 9/25/2016    Procedure: COMBINED ESOPHAGOSCOPY, GASTROSCOPY, DUODENOSCOPY (EGD);  Surgeon: Aziza Patiño MD;  Location:  [facility]     ESOPHAGOSCOPY, GASTROSCOPY, DUODENOSCOPY (EGD), COMBINED N/A 1/18/2017    Procedure: COMBINED ESOPHAGOSCOPY, GASTROSCOPY, DUODENOSCOPY  (EGD), BIOPSY SINGLE OR MULTIPLE;  Surgeon: Clemente Lopez MD;  [facility]     FASCIOTOMY LOWER EXTREMITY Left 6/10/2016    Procedure: FASCIOTOMY LOWER EXTREMITY;  Surgeon: Mello Rodriguez MD;  [facility]     HC CAPSULE ENDOSCOPY N/A 8/25/2014    Procedure: CAPSULE/PILL CAM ENDOSCOPY;  Surgeon: Remy Haskins MD;  [facility]     HC CAPSULE ENDOSCOPY N/A 10/2/2014    Procedure: CAPSULE/PILL CAM ENDOSCOPY;  Surgeon: Remy Haskins MD;  [facility]     ORTHOPEDIC SURGERY      broken wrist repair     SEX TRANSFORMATION SURGERY, MALE TO FEMALE      1974     SINUS SURGERY      cyst removed     TONSILLECTOMY       VASCULAR SURGERY      Left carotid stent       Social History   Substance Use Topics     Smoking status: Former Smoker     Packs/day: 2.50     Years: 30.00     Types: Cigarettes, Cigars     Quit date: 11/1/2000     Smokeless tobacco: Never Used     Alcohol use No     Family History   Problem Relation Age of Onset     Dementia Mother      Glaucoma Mother      DIABETES Mother      Coronary Artery Disease Mother      MI     Glaucoma Father      DIABETES Father      Heart Failure Father      CANCER Maternal Aunt      leukemia     Schizophrenia Brother      Depression Brother      Suicide Sister      Depression Sister      DIABETES Sister      CANCER Maternal Aunt      ovarian     Glaucoma Maternal Grandmother      DIABETES Maternal Grandmother      Glaucoma Maternal Grandfather      DIABETES Maternal Grandfather      Glaucoma Paternal Grandmother      DIABETES Paternal Grandmother      Glaucoma Paternal Grandfather      DIABETES Paternal Grandfather      Breast Cancer Sister      CEREBROVASCULAR DISEASE Brother          Current Outpatient Prescriptions   Medication Sig Dispense Refill     cetirizine (ZYRTEC) 10 MG tablet Take 1 tablet (10 mg) by mouth daily as needed for allergies 90 tablet 3     escitalopram (LEXAPRO) 10 MG tablet Take 1 tablet (10 mg) by mouth daily 90 tablet 1      albuterol (VENTOLIN HFA) 108 (90 BASE) MCG/ACT Inhaler Inhale 1-2 puffs into the lungs every 6 hours as needed 1 Inhaler 11     diclofenac (VOLTAREN) 1 % GEL topical gel Apply 2 g topically 4 times daily to hands 100 g 11     EPINEPHrine (EPIPEN JR) 0.15 MG/0.3ML injection Inject 0.3 mLs (0.15 mg) into the muscle as needed for anaphylaxis (Patient not taking: Reported on 5/2/2017) 0.6 mL 1     lisinopril (PRINIVIL/ZESTRIL) 10 MG tablet Take 1 tablet (10 mg) by mouth daily 90 tablet 3     pantoprazole (PROTONIX) 40 MG EC tablet Take 40 mg by mouth daily       pregabalin (LYRICA) 100 MG capsule Take 100 mg by mouth 2 times daily       risperiDONE (RISPERDAL) 0.5 MG tablet Take 0.5 mg by mouth At Bedtime       ferrous sulfate (IRON) 325 (65 FE) MG tablet Take 1 tablet (325 mg) by mouth 2 times daily With meals 60 tablet 2     lidocaine (LIDODERM) 5 % Patch Apply from 1 to 3 patches to painful area at once for up to 12 h within a 24 h period.  Remove after 12 hours. 30 patch 1     blood glucose monitoring (ONE TOUCH ULTRA) test strip Use to test blood sugars 3 times daily or as directed. 3 Box 3     order for DME Equipment being ordered: wheelchair seat cushion 1 Device 0     order for DME Equipment being ordered: CPAP supplies mask, hose, filters, cushion    fax to [facility] at [phone] 10 Device prn     sucralfate (CARAFATE) 1 GM tablet Take 1 tablet (1 g) by mouth 4 times daily May dissolve in 10 mL water is necessary. (Start upon completion of carafate suspension) 40 tablet 5     order for DME Equipment being ordered: CPAP supplies mask, hose, filters, cushion fax to [facility] at [phone]  Disp: 10 Device Refills: prn   Class: Local Print Start: 2/10/2017 1 Device 0     order for DME Equipment being ordered: Nebulizer and tubing supplies 1 Units 0     albuterol (2.5 MG/3ML) 0.083% neb solution INHALE 1 VIAL VIA NEBULIZER EVERY 6 HOURS AS NEEDED 360 mL 11     order for DME ACcu check BID 1  Device 0     ondansetron (ZOFRAN-ODT) 4 MG ODT tab Take 1 tablet (4 mg) by mouth every 6 hours (Patient taking differently: Take 4 mg by mouth every 6 hours as needed ) 120 tablet 0     metoprolol (TOPROL-XL) 25 MG 24 hr tablet Take 1 tablet (25 mg) by mouth daily 30 tablet 0     CYANOCOBALAMIN PO Take 2,000 mcg by mouth daily       Nutritional Supplements (RENÉE) PACK Take 1 packet by mouth 2 times daily       polyethylene glycol (MIRALAX/GLYCOLAX) powder Take 17 g by mouth daily       HYDROmorphone (DILAUDID) 2 MG tablet Take 1 tablet (2 mg) by mouth every 3 hours as needed for moderate to severe pain 30 tablet 0     fluticasone (FLONASE) 50 MCG/ACT nasal spray Spray 1 spray into both nostrils daily       ADVAIR DISKUS 250-50 MCG/DOSE diskus inhaler Inhale 1 puff into the lungs 2 times daily        calcium citrate-vitamin D (CITRACAL) 315-250 MG-UNIT TABS Take 2 tablets by mouth daily 120 tablet 5     hydrochlorothiazide (MICROZIDE) 12.5 MG capsule Take 1 capsule (12.5 mg) by mouth daily 90 capsule 3     estradiol (ESTRACE) 1 MG tablet Take 1 tablet (1 mg) by mouth daily 90 tablet 3     levETIRAcetam (KEPPRA) 500 MG tablet Take 1 tablet (500 mg) by mouth 2 times daily 180 tablet 1     traZODone (DESYREL) 100 MG tablet Take 1 tablet (100 mg) by mouth At Bedtime 90 tablet 3     aspirin EC 81 MG EC tablet Take 1 tablet (81 mg) by mouth daily 90 tablet 3     clopidogrel (PLAVIX) 75 MG tablet Take 1 tablet (75 mg) by mouth daily (Patient taking differently: Take 75 mg by mouth At Bedtime ) 90 tablet 3     blood glucose monitoring (ONE TOUCH ULTRA 2) meter device kit Use to test blood sugars 3 times daily or as directed. 1 kit 0     blood glucose monitoring (ONE TOUCH ULTRASOFT) lancets Use to test blood sugar 3 times daily or as directed. 3 Box 3     phenytoin 200 MG CAPS Take 200 mg by mouth 2 times daily (Patient taking differently: Take 200 mg by mouth 2 times daily (takes at 8 AM and 8 PM)) 60 capsule 0      dorzolamide (TRUSOPT) 2 % ophthalmic solution Place 1 drop into both eyes 2 times daily        SPIRIVA HANDIHALER 18 MCG inhalation capsule INHALE THE CONTENTS OF 1 CAPSULE VIA INHALATION DEVICE DAILY 30 capsule 2     zinc 50 MG TABS Take 1 tablet by mouth daily       nitroglycerin (NITROSTAT) 0.4 MG SL tablet Place 1 tablet (0.4 mg) under the tongue every 5 minutes as needed for chest pain if you are still having symptoms after 3 doses (15 minutes) call 911. (Patient not taking: Reported on 5/2/2017) 25 tablet 1     MEDICATION GIVEN BY INTRATHECAL PUMP - INSTRUCTION continuous 4/11/2016 per [facility] in [place] [phone]:  Conc: Bupivacaine 20 mg/mL and Fentanyl 2000 mcg/mL.  Continuous: Bupivacaine 5.052 mg/day and Fentanyl 505.2 mcg/day.  Boluses: Up to 7 boluses per 24-hr period of Bupivicaine 0.5992 mg and Fentanyl 59.9 mcg  Max daily dose: Bupivacaine 9.1973 mg/day and Fentanyl 919.5883 mcg/day    Pump Last Fill Date:  6/30/2016  Pump Refill Date: 9/20/2016       latanoprost (XALATAN) 0.005 % ophthalmic solution Place 1 drop into both eyes At Bedtime 2.5 mL 11     atorvastatin (LIPITOR) 40 MG tablet Take 1 tablet (40 mg) by mouth daily (Patient taking differently: Take 40 mg by mouth At Bedtime ) 90 tablet 3     order for DME Equipment being ordered: Glucerna daily shakes with each meal 1 Box 11     prochlorperazine (COMPAZINE) 10 MG tablet Take 1 tablet (10 mg) by mouth every 8 hours as needed 20 tablet 0     thin (NO BRAND SPECIFIED) lancets Use to test blood sugar 3 times daily or as directed. 1 Box 10     ORDER FOR DME Equipment being ordered: Nebulizer and supplies 1 Units 0     ORDER FOR DME Equipment being ordered: Power Wheelchair 1 Device 0     ORDER FOR DME Equipment being ordered: Depends briefs 1 Month 11     EASY TOUCH LANCETS 30G/TWIST MISC        Cholecalciferol (VITAMIN D) 2000 UNITS tablet Take 2,000 Units by mouth daily. 100 tablet 3     Multiple Vitamin  (MULTIVITAMIN OR) Take 1 tablet by mouth daily        Allergies   Allergen Reactions     Bee Venom      Penicillins Anaphylaxis     Other reaction(s): Skin Rash and/or Hives     Dilantin [Phenytoin] Other (See Comments)     Generic dilantin only per pt     Iodide Hives     09/11/15: Pt states she can use iodine and doesn't have any problems      Iodine-131      Novocaine [Procaine] Hives     Other reaction(s): Skin Rash and/or Hives     BP Readings from Last 3 Encounters:   05/02/17 131/69   04/26/17 147/83   04/20/17 146/74    Wt Readings from Last 3 Encounters:   05/02/17 133 lb (60.3 kg)   04/26/17 133 lb (60.3 kg)   04/19/17 133 lb (60.3 kg)           Reviewed and updated as needed this visit by clinical staff       Reviewed and updated as needed this visit by Provider       ROS:  C: NEGATIVE for fever, chills, change in weight  E/M: NEGATIVE for ear, mouth and throat problems  R: NEGATIVE for significant cough or SOB  CV: NEGATIVE for chest pain, palpitations or peripheral edema  GI: NEGATIVE for nausea, abdominal pain, heartburn, or change in bowel habits  : NEGATIVE for frequency, dysuria, or hematuria  H: NEGATIVE for bleeding problems  P: NEGATIVE for changes in mood or affect    OBJECTIVE:                                                    /72  Pulse 87  Temp 97.4  F (36.3  C) (Oral)  Wt 133 lb (60.3 kg)  SpO2 96%  BMI 20.83 kg/m2  Body mass index is 20.83 kg/(m^2).  GENERAL: alert and no distress  EYES: Eyes grossly normal to inspection, extraocular movements - intact, and PERRL  HENT: ear canals- normal; TMs- normal; Nose- normal; Mouth- no ulcers, no lesions  NECK: no midline tenderness, no adenopathy, no asymmetry, no masses, mild stiffness; thyroid- normal to palpation with mild tenderness SCM strap muscle R>L.  RESP: lungs clear to auscultation - no rales, no rhonchi, no wheezes  CV: regular rates and rhythm, normal S1 S2, no S3 or S4 and no click or rub   MS: in wheelchair, noted  bilateral BKA's  NEURO: no new focal changes  PSYCH: Alert and oriented times 3; speech- coherent , normal rate and volume; able to articulate logical thoughts, able to abstract reason, no tangential thoughts, no hallucinations or delusions, affect- normal  Minimal to no swelling head posterior, nontender, right..       ASSESSMENT/PLAN:                                                    (M54.2) Cervicalgia  (primary encounter diagnosis)  Comment: appears as muscular in origin  Plan: cyclobenzaprine (FLEXERIL) 10 MG tablet, XR         Cervical Spine 2/3 Views        Suggested limiting exercise program    (W19.XXXD) Fall, subsequent encounter  Comment: appears as secondary issue, resolving  Plan: no further falls    (I10) Essential hypertension with goal blood pressure less than 130/80  Comment: stable on therapy  Plan:     (G89.4) Chronic pain syndrome  Comment: refilled per request  Plan: lidocaine (LIDODERM) 5 % Patch, cyclobenzaprine        (FLEXERIL) 10 MG tablet            (Z89.511) 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 (H)  Comment: prn use for phantom pain  Plan: lidocaine (LIDODERM) 5 % Patch          See Patient Instructions    Allan Casey MD  [facility]    THE MEDICATION LIST HAS BEEN FULLY RECONCILED BY THE [facility] AND THE NURSING STAFF.  25 minutes spent with this patient, face to face, discussing treatment options for listed problems above as well as side effects of appropriate medications.  Counseling time extended beyond 50% of the clinic visit.  Medication dosing, treatment plan and follow-up were discussed. Also reviewed need for primary care testing for patient.

## 2017-05-10 ENCOUNTER — RADIANT APPOINTMENT (OUTPATIENT)
Dept: GENERAL RADIOLOGY | Facility: CLINIC | Age: 67
End: 2017-05-10
Attending: INTERNAL MEDICINE
Payer: COMMERCIAL

## 2017-05-10 ENCOUNTER — OFFICE VISIT (OUTPATIENT)
Dept: INTERNAL MEDICINE | Facility: CLINIC | Age: 67
End: 2017-05-10
Payer: COMMERCIAL

## 2017-05-10 VITALS
WEIGHT: 133 LBS | TEMPERATURE: 97.4 F | OXYGEN SATURATION: 96 % | SYSTOLIC BLOOD PRESSURE: 124 MMHG | DIASTOLIC BLOOD PRESSURE: 72 MMHG | HEART RATE: 87 BPM | BODY MASS INDEX: 20.83 KG/M2

## 2017-05-10 DIAGNOSIS — M54.2 CERVICALGIA: ICD-10-CM

## 2017-05-10 DIAGNOSIS — M54.2 CERVICALGIA: Primary | ICD-10-CM

## 2017-05-10 DIAGNOSIS — I10 ESSENTIAL HYPERTENSION WITH GOAL BLOOD PRESSURE LESS THAN 130/80: ICD-10-CM
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
## 2017-05-10 DIAGNOSIS — G89.4 CHRONIC PAIN SYNDROME: ICD-10-CM

## 2017-05-10 DIAGNOSIS — Z89.511 STATUS POST BELOW KNEE AMPUTATION OF RIGHT LOWER EXTREMITY (H): ICD-10-CM

## 2017-05-10 PROCEDURE — 72040 X-RAY EXAM NECK SPINE 2-3 VW: CPT

## 2017-05-10 PROCEDURE — 99214 OFFICE O/P EST MOD 30 MIN: CPT | Performed by: INTERNAL MEDICINE

## 2017-05-10 RX ORDER — LIDOCAINE 50 MG/G
PATCH TOPICAL
Qty: 30 PATCH | Refills: 1 | Status: ON HOLD | OUTPATIENT
Start: 2017-05-10 | End: 2017-05-31

## 2017-05-10 RX ORDER — CYCLOBENZAPRINE HCL 10 MG
5-10 TABLET ORAL 3 TIMES DAILY PRN
Qty: 30 TABLET | Refills: 1 | Status: ON HOLD | OUTPATIENT
Start: 2017-05-10 | End: 2017-05-31

## 2017-05-10 NOTE — NURSING NOTE
"Chief Complaint   Patient presents with     Fall     Neck Pain       Initial /72  Pulse 87  Temp 97.4  F (36.3  C) (Oral)  Wt 133 lb (60.3 kg)  SpO2 96%  BMI 20.83 kg/m2 Estimated body mass index is 20.83 kg/(m^2) as calculated from the following:    Height as of 1/9/17: 5' 7\" (1.702 m).    Weight as of this encounter: 133 lb (60.3 kg).  Medication Reconciliation: complete   Daniela Hancock CMA      "

## 2017-05-10 NOTE — MR AVS SNAPSHOT
After Visit Summary   5/10/2017    Sonya Foote    MRN: [id number]           Patient Information     Date Of Birth          1950        Visit Information        Provider Department      5/10/2017 7:40 AM Allan Casey MD [facility]        Today's Diagnoses     Cervicalgia    -  1    Fall, subsequent encounter        Essential hypertension with goal blood pressure less than 130/80        Chronic pain syndrome        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 (H)           Follow-ups after your visit        Follow-up notes from your care team     Return if symptoms worsen or fail to improve.      Your next 10 appointments already scheduled     May 10, 2017  7:40 AM CDT   [facility]   [facility]    [address] [place] [address] [id number] [phone]            May 10, 2017 10:30 AM CDT   MA SCREENING DIGITAL BILATERAL with OXMA1   [facility]    [address] [place] [id number] [phone]           Do not use any powder, lotion or deodorant under your arms or on your breast. If you do, we will ask you to remove it before your exam.  Wear comfortable, two-piece clothing.  If you have any allergies, tell your care team.  Bring any previous mammograms from other facilities or have them mailed to the breast center.            May 12, 2017 10:50 AM CDT   (Arrive by 10:35 AM)   RETURN DIABETES with Michelle Irizarry MD   [facility] ([facility])    [address] [place] [id number] [phone]            May 18, 2017  1:00 PM CDT   (Arrive by 12:45 PM)   Cystoscopy with Elizabeth Oliver MD   [facility] ([facility])    [address] [place] [address]  Floor  [facility] [address]   [phone]            Jun 06, 2017 10:30 AM CDT   Return Sleep Patient with Guanaco Kowalski MD   [facility] ([facility])    [address] [place] [id number]   [phone]            Jul 10, 2017 11:00 AM CDT   (Arrive by 10:45 AM)   Return Visit with Alina Jennings MD   [facility] ([facility])    [address] [place] [facility]  3rd [facility] [address]   [phone]            Sep 08, 2017 10:00 AM CDT   (Arrive by 9:45 AM)   New Patient Visit with VICTOR M Floyd [facility] ([facility])    [address] [place] [address]  4th [facility] [address]   [phone]              Future tests that were ordered for you today     Open Future Orders        Priority Expected Expires Ordered    XR Cervical Spine 2/3 Views Routine 5/10/2017 5/10/2018 5/10/2017            Who to contact     If you have questions or need follow up information about today's clinic visit or your schedule please contact [facility] directly at [phone].  Normal or non-critical lab and imaging results will be communicated to you by AppGeekhart, letter or phone within 4 business days after the clinic has received the results. If you do not hear from us within 7 days, please contact the clinic through AppGeekhart or phone. If you have a critical or abnormal lab result, we will notify you by phone as soon as possible.  Submit refill requests through Typeform or call your pharmacy and they will forward the refill request to us. Please allow 3 business days for your refill to be completed.          Additional Information About Your Visit        Typeform Information     Typeform lets you send messages to your doctor, view your test results, renew your prescriptions, schedule  "appointments and more. To sign up, go to [email or web address] . Click on \"Log in\" on the left side of the screen, which will take you to the Welcome page. Then click on \"Sign up Now\" on the right side of the page.     You will be asked to enter the access code listed below, as well as some personal information. Please follow the directions to create your username and password.     Your access code is: [email or web address]  Expires: 2017 11:52 AM     Your access code will  in 90 days. If you need help or a new code, please call your [facility] clinic or [phone].        Care EveryWhere ID     This is your Care EveryWhere ID. This could be used by other organizations to access your [facility] medical records  [id number]        Your Vitals Were     Pulse Temperature Pulse Oximetry BMI (Body Mass Index)          87 97.4  F (36.3  C) (Oral) 96% 20.83 kg/m2         Blood Pressure from Last 3 Encounters:   05/10/17 124/72   17 131/69   17 147/83    Weight from Last 3 Encounters:   05/10/17 133 lb (60.3 kg)   17 133 lb (60.3 kg)   17 133 lb (60.3 kg)              We Performed the Following     Asthma Action Plan (AAP)          Today's Medication Changes          These changes are accurate as of: 5/10/17  7:08 AM.  If you have any questions, ask your nurse or doctor.               Start taking these medicines.        Dose/Directions    cyclobenzaprine 10 MG tablet   Commonly known as:  FLEXERIL   Used for:  Chronic pain syndrome, Cervicalgia   Started by:  Allan Casey MD        Dose:  5-10 mg   Take 0.5-1 tablets (5-10 mg) by mouth 3 times daily as needed for muscle spasms   Quantity:  30 tablet   Refills:  1         These medicines have changed or have updated prescriptions.        Dose/Directions    atorvastatin 40 MG tablet   Commonly known as:  LIPITOR   This may have changed:  when to take this   Used for:  Hyperlipidemia LDL goal <100        Dose:  40 mg   Take 1 tablet (40 mg) by " mouth daily   Quantity:  90 tablet   Refills:  3       clopidogrel 75 MG tablet   Commonly known as:  PLAVIX   This may have changed:  when to take this   Used for:  PAD (peripheral artery disease) (H), UGI bleed, Osteoporosis, Nausea        Dose:  75 mg   Take 1 tablet (75 mg) by mouth daily   Quantity:  90 tablet   Refills:  3       ondansetron 4 MG ODT tab   Commonly known as:  ZOFRAN-ODT   This may have changed:    - when to take this  - reasons to take this   Used for:  Intractable vomiting with nausea, unspecified vomiting type        Dose:  4 mg   Take 1 tablet (4 mg) by mouth every 6 hours   Quantity:  120 tablet   Refills:  0       Phenytoin Sodium Extended 200 MG Caps   This may have changed:  additional instructions   Used for:  Seizure disorder (H)        Dose:  200 mg   Take 200 mg by mouth 2 times daily   Quantity:  60 capsule   Refills:  0            Where to get your medicines      These medications were sent to [facility] [address] [address]     Phone:  [phone]     cyclobenzaprine 10 MG tablet    lidocaine 5 % Patch                Primary Care Provider Office Phone # Fax #    Allan Casey [phone]       [address] [address]        Thank you!     Thank you for choosing [facility]  for your care. Our goal is always to provide you with excellent care. Hearing back from our patients is one way we can continue to improve our services. Please take a few minutes to complete the written survey that you may receive in the mail after your visit with us. Thank you!             Your Updated Medication List - Protect others around you: Learn how to safely use, store and throw away your medicines at www.disposemymeds.org.          This list is accurate as of: 5/10/17  7:08 AM.  Always use your most recent med list.                   Brand Name  Dispense Instructions for use    ADVAIR DISKUS 250-50 MCG/DOSE diskus inhaler   Generic drug:  fluticasone-salmeterol      Inhale 1 puff into the lungs 2 times daily       * albuterol (2.5 MG/3ML) 0.083% neb solution     360 mL    INHALE 1 VIAL VIA NEBULIZER EVERY 6 HOURS AS NEEDED       * albuterol 108 (90 BASE) MCG/ACT Inhaler    VENTOLIN HFA    1 Inhaler    Inhale 1-2 puffs into the lungs every 6 hours as needed       aspirin 81 MG EC tablet     90 tablet    Take 1 tablet (81 mg) by mouth daily       atorvastatin 40 MG tablet    LIPITOR    90 tablet    Take 1 tablet (40 mg) by mouth daily       blood glucose monitoring meter device kit     1 kit    Use to test blood sugars 3 times daily or as directed.       blood glucose monitoring test strip    ONE TOUCH ULTRA    3 Box    Use to test blood sugars 3 times daily or as directed.       calcium citrate-vitamin D 315-250 MG-UNIT Tabs per tablet    CITRACAL    120 tablet    Take 2 tablets by mouth daily       cetirizine 10 MG tablet    zyrTEC    90 tablet    Take 1 tablet (10 mg) by mouth daily as needed for allergies       clopidogrel 75 MG tablet    PLAVIX    90 tablet    Take 1 tablet (75 mg) by mouth daily       CYANOCOBALAMIN PO      Take 2,000 mcg by mouth daily       cyclobenzaprine 10 MG tablet    FLEXERIL    30 tablet    Take 0.5-1 tablets (5-10 mg) by mouth 3 times daily as needed for muscle spasms       diclofenac 1 % Gel topical gel    VOLTAREN    100 g    Apply 2 g topically 4 times daily to hands       dorzolamide 2 % ophthalmic solution    TRUSOPT     Place 1 drop into both eyes 2 times daily       * EASY TOUCH LANCETS 30G/TWIST Misc          * thin lancets    NO BRAND SPECIFIED    1 Box    Use to test blood sugar 3 times daily or as directed.       * blood glucose monitoring lancets     3 Box    Use to test blood sugar 3 times daily or as directed.       EPINEPHrine 0.15 MG/0.3ML injection    EPIPEN JR    0.6 mL    Inject 0.3 mLs (0.15 mg) into the  muscle as needed for anaphylaxis       escitalopram 10 MG tablet    LEXAPRO    90 tablet    Take 1 tablet (10 mg) by mouth daily       estradiol 1 MG tablet    ESTRACE    90 tablet    Take 1 tablet (1 mg) by mouth daily       ferrous sulfate 325 (65 FE) MG tablet    IRON    60 tablet    Take 1 tablet (325 mg) by mouth 2 times daily With meals       fluticasone 50 MCG/ACT spray    FLONASE     Spray 1 spray into both nostrils daily       hydrochlorothiazide 12.5 MG capsule    MICROZIDE    90 capsule    Take 1 capsule (12.5 mg) by mouth daily       HYDROmorphone 2 MG tablet    DILAUDID    30 tablet    Take 1 tablet (2 mg) by mouth every 3 hours as needed for moderate to severe pain       RENÉE Pack      Take 1 packet by mouth 2 times daily       latanoprost 0.005 % ophthalmic solution    XALATAN    2.5 mL    Place 1 drop into both eyes At Bedtime       levETIRAcetam 500 MG tablet    KEPPRA    180 tablet    Take 1 tablet (500 mg) by mouth 2 times daily       lidocaine 5 % Patch    LIDODERM    30 patch    Apply from 1 to 3 patches to painful area at once for up to 12 h within a 24 h period.  Remove after 12 hours.       lisinopril 10 MG tablet    PRINIVIL/ZESTRIL    90 tablet    Take 1 tablet (10 mg) by mouth daily       LYRICA 100 MG capsule   Generic drug:  pregabalin      Take 100 mg by mouth 2 times daily       MEDICATION GIVEN BY INTRATHECAL PUMP - INSTRUCTION      continuous 4/11/2016 per [facility] [place] [phone]: Conc: Bupivacaine 20 mg/mL and Fentanyl 2000 mcg/mL. Continuous: Bupivacaine 5.052 mg/day and Fentanyl 505.2 mcg/day. Boluses: Up to 7 boluses per 24-hr period of Bupivicaine 0.5992 mg and Fentanyl 59.9 mcg Max daily dose: Bupivacaine 9.1973 mg/day and Fentanyl 919.5883 mcg/day  Pump Last Fill Date:  6/30/2016 Pump Refill Date: 9/20/2016       metoprolol 25 MG 24 hr tablet    TOPROL-XL    30 tablet    Take 1 tablet (25 mg) by mouth daily       MULTIVITAMIN PO       Take 1 tablet by mouth daily       nitroglycerin 0.4 MG sublingual tablet    NITROSTAT    25 tablet    Place 1 tablet (0.4 mg) under the tongue every 5 minutes as needed for chest pain if you are still having symptoms after 3 doses (15 minutes) call 911.       ondansetron 4 MG ODT tab    ZOFRAN-ODT    120 tablet    Take 1 tablet (4 mg) by mouth every 6 hours       * order for DME     1 Month    Equipment being ordered: Depends briefs       * order for DME     1 Device    Equipment being ordered: Power Wheelchair       * order for DME     1 Units    Equipment being ordered: Nebulizer and supplies       * order for DME     1 Box    Equipment being ordered: Glucerna daily shakes with each meal       * order for DME     1 Device    ACcu check BID       * order for DME     1 Units    Equipment being ordered: Nebulizer and tubing supplies       * order for DME     1 Device    Equipment being ordered: CPAP supplies mask, hose, filters, cushion fax to [facility] at [phone] Disp: 10 DeviceRefills: prn Class: Local PrintStart: 2/10/2017       * order for DME     10 Device    Equipment being ordered: CPAP supplies mask, hose, filters, cushion  fax to [facility] at [phone]       * order for DME     1 Device    Equipment being ordered: wheelchair seat cushion       pantoprazole 40 MG EC tablet    PROTONIX     Take 40 mg by mouth daily       Phenytoin Sodium Extended 200 MG Caps     60 capsule    Take 200 mg by mouth 2 times daily       polyethylene glycol powder    MIRALAX/GLYCOLAX     Take 17 g by mouth daily       prochlorperazine 10 MG tablet    COMPAZINE    20 tablet    Take 1 tablet (10 mg) by mouth every 8 hours as needed       risperiDONE 0.5 MG tablet    risperDAL     Take 0.5 mg by mouth At Bedtime       SPIRIVA HANDIHALER 18 MCG capsule   Generic drug:  tiotropium     30 capsule    INHALE THE CONTENTS OF 1 CAPSULE VIA INHALATION DEVICE DAILY       sucralfate 1 GM tablet    CARAFATE    40 tablet     Take 1 tablet (1 g) by mouth 4 times daily May dissolve in 10 mL water is necessary. (Start upon completion of carafate suspension)       traZODone 100 MG tablet    DESYREL    90 tablet    Take 1 tablet (100 mg) by mouth At Bedtime       vitamin D 2000 UNITS tablet     100 tablet    Take 2,000 Units by mouth daily.       zinc 50 MG Tabs      Take 1 tablet by mouth daily       * Notice:  This list has 14 medication(s) that are the same as other medications prescribed for you. Read the directions carefully, and ask your doctor or other care provider to review them with you.

## 2017-05-11 ENCOUNTER — TELEPHONE (OUTPATIENT)
Dept: INTERNAL MEDICINE | Facility: CLINIC | Age: 67
End: 2017-05-11

## 2017-05-11 NOTE — TELEPHONE ENCOUNTER
-PRIOR AUTHORIZATION REQUIRED-  This medication requires a Prior Authorization in order for the Insurance to cover.  Please contact the insurance to start the PA for this medication.  Please inform the pharmacy if the PA gets approved or denied.  Cyclobenzaprine 10 MG   ID:[id number]  Insurance Company:[employer]  Phone #:[phone]    Thanks!  Ariadna Gonzalez,[facility]

## 2017-05-12 ENCOUNTER — OFFICE VISIT (OUTPATIENT)
Dept: ENDOCRINOLOGY | Facility: CLINIC | Age: 67
End: 2017-05-12

## 2017-05-12 VITALS — HEART RATE: 76 BPM | SYSTOLIC BLOOD PRESSURE: 123 MMHG | DIASTOLIC BLOOD PRESSURE: 79 MMHG

## 2017-05-12 DIAGNOSIS — E11.51 TYPE 2 DIABETES MELLITUS WITH DIABETIC PERIPHERAL ANGIOPATHY WITHOUT GANGRENE, WITHOUT LONG-TERM CURRENT USE OF INSULIN (H): Primary | ICD-10-CM

## 2017-05-12 LAB — HBA1C MFR BLD: 4.8 % (ref 4.3–6)

## 2017-05-12 ASSESSMENT — PAIN SCALES - GENERAL: PAINLEVEL: NO PAIN (0)

## 2017-05-12 NOTE — PROGRESS NOTES
Endocrinology Note         Sonya is a 67 year old female presents today for type 2 diabetes    HPI  Sonya Foote is a 67 years old female who has multiple medical history mainly type 2 diabetes with neuropathy, s/p bilateral above knee amputation, stroke x3, coronary artery disease, left carotid artery stenosis s/p stent, peripheral artery disease s/p stents and frequent fall. She is here today to follow up type 2 diabetes. She has been diagnosed with type 2 diabetes for 8 years. Prior to that, she was prediabetes for many years.     Interval history:  Last visit with me was in 9/2016. Since the last visit, she had right knee amputation due to infection and gangrene. She did have left above knee amputation in June 2016. She is now wheelchair bound but working with physical therapist for transferring her place to place. She is currently living at assisted living and the nurse prepared all medication and checked blood glucose for her.    For diabetes, A1C today is 4.8% (Hb of 12). I don't have glucose meter to review today. Her PCP stopped metformin a few months ago due to normal glucose level.     DM complications:  Retinopathy: right eye - glaucoma, left eye partial blindness   Nephropathy: Cr 0.5 (9/2016), microalbumin normal (2/2016)  Neuropathy: numbness and tingling bilateral feet and hands - on gabapentin  On ASA and plavix  On atorvastatin 40 mg, LDL 77 (6/6/16)    She also has osteoporosis. DXA in 11/2012 showed T-score of -2.6 (BMD 0.684) at left femoral neck. DXA in 7/2015 showed lumbar spine (L1-L4) T-score: -2.4 Degenerative and/or osteosclerotic changes are present, falsely improving result and left femoral neck T-score was -1.9, right femoral neck T-score was -2.2. She has been on alendronate 70 mg weekly since 2012 but stopped a few weeks ago. She takes 2000 IU of vitamin D3. She eats cheese regularly.     Past Medical History  Past Medical History:   Diagnosis Date     Anemia      Antiplatelet or  antithrombotic long-term use      Arthritis      AS (sickle cell trait) (H) 10/8/2013     Blind left eye      BPPV (benign paroxysmal positional vertigo)      Chronic back pain      COPD (chronic obstructive pulmonary disease) (H)      Coronary artery disease      CVA (cerebral infarction) 10/8/2012    x3, residual left sided weakness     Diastolic CHF (H) 9/4/2012     Dilantin toxicity 5/31/2013     Esophageal candidiasis (H)      GERD (gastroesophageal reflux disease)      Heart attack (H)      Hernia, abdominal      History of blood transfusion     x5     History of thrombophlebitis      HTN (hypertension) 9/4/2012     Hyperlipidemia LDL goal <100 9/4/2012     Legally blind in right eye, as defined in [place]     No periphal vision right eye     Osteoporosis 1/21/2013     Other chronic pain      PAD (peripheral artery disease) (H)      Palpitations      Person who has had sex change operation 1/20/2014     Pneumonia      Schizoaffective disorder (H)      Seizure disorder (H) 9/4/2012     Seizures (H)      Sleep apnea     CPAP     Thrombosis of leg      Type 2 diabetes, HbA1C goal < 8% (H) 9/4/2012     Uncomplicated asthma      Unspecified cerebral artery occlusion with cerebral infarction    - hx of UGI bleeding secondary to severe esophagitis and gastric ulcer  - Right common femoral artery, SFA, and popliteal artery balloon athrectomy and angioplasty and right SFA stent placement 9/14/15.  - bilateral above knee amputation    Allergies  Allergies   Allergen Reactions     Bee Venom      Penicillins Anaphylaxis     Other reaction(s): Skin Rash and/or Hives     Dilantin [Phenytoin] Other (See Comments)     Generic dilantin only per pt     Iodide Hives     09/11/15: Pt states she can use iodine and doesn't have any problems      Iodine-131      Novocaine [Procaine] Hives     Other reaction(s): Skin Rash and/or Hives     Medications  Current Outpatient Prescriptions   Medication Sig Dispense Refill     lidocaine  (LIDODERM) 5 % Patch Apply from 1 to 3 patches to painful area at once for up to 12 h within a 24 h period.  Remove after 12 hours. 30 patch 1     cyclobenzaprine (FLEXERIL) 10 MG tablet Take 0.5-1 tablets (5-10 mg) by mouth 3 times daily as needed for muscle spasms 30 tablet 1     cetirizine (ZYRTEC) 10 MG tablet Take 1 tablet (10 mg) by mouth daily as needed for allergies 90 tablet 3     escitalopram (LEXAPRO) 10 MG tablet Take 1 tablet (10 mg) by mouth daily 90 tablet 1     albuterol (VENTOLIN HFA) 108 (90 BASE) MCG/ACT Inhaler Inhale 1-2 puffs into the lungs every 6 hours as needed 1 Inhaler 11     diclofenac (VOLTAREN) 1 % GEL topical gel Apply 2 g topically 4 times daily to hands 100 g 11     EPINEPHrine (EPIPEN JR) 0.15 MG/0.3ML injection Inject 0.3 mLs (0.15 mg) into the muscle as needed for anaphylaxis 0.6 mL 1     lisinopril (PRINIVIL/ZESTRIL) 10 MG tablet Take 1 tablet (10 mg) by mouth daily 90 tablet 3     pantoprazole (PROTONIX) 40 MG EC tablet Take 40 mg by mouth daily       pregabalin (LYRICA) 100 MG capsule Take 100 mg by mouth 2 times daily       risperiDONE (RISPERDAL) 0.5 MG tablet Take 0.5 mg by mouth At Bedtime       ferrous sulfate (IRON) 325 (65 FE) MG tablet Take 1 tablet (325 mg) by mouth 2 times daily With meals 60 tablet 2     blood glucose monitoring (ONE TOUCH ULTRA) test strip Use to test blood sugars 3 times daily or as directed. 3 Box 3     order for DME Equipment being ordered: wheelchair seat cushion 1 Device 0     order for DME Equipment being ordered: CPAP supplies mask, hose, filters, cushion    fax to [facility] at [phone] 10 Device prn     sucralfate (CARAFATE) 1 GM tablet Take 1 tablet (1 g) by mouth 4 times daily May dissolve in 10 mL water is necessary. (Start upon completion of carafate suspension) 40 tablet 5     order for DME Equipment being ordered: CPAP supplies mask, hose, filters, cushion fax to [facility] at [phone]  Disp: 10 Device Refills:  prn   Class: Local Print Start: 2/10/2017 1 Device 0     order for DME Equipment being ordered: Nebulizer and tubing supplies 1 Units 0     albuterol (2.5 MG/3ML) 0.083% neb solution INHALE 1 VIAL VIA NEBULIZER EVERY 6 HOURS AS NEEDED 360 mL 11     order for DME ACcu check BID 1 Device 0     ondansetron (ZOFRAN-ODT) 4 MG ODT tab Take 1 tablet (4 mg) by mouth every 6 hours (Patient taking differently: Take 4 mg by mouth every 6 hours as needed ) 120 tablet 0     metoprolol (TOPROL-XL) 25 MG 24 hr tablet Take 1 tablet (25 mg) by mouth daily 30 tablet 0     CYANOCOBALAMIN PO Take 2,000 mcg by mouth daily       Nutritional Supplements (RENÉE) PACK Take 1 packet by mouth 2 times daily       polyethylene glycol (MIRALAX/GLYCOLAX) powder Take 17 g by mouth daily       HYDROmorphone (DILAUDID) 2 MG tablet Take 1 tablet (2 mg) by mouth every 3 hours as needed for moderate to severe pain 30 tablet 0     fluticasone (FLONASE) 50 MCG/ACT nasal spray Spray 1 spray into both nostrils daily       ADVAIR DISKUS 250-50 MCG/DOSE diskus inhaler Inhale 1 puff into the lungs 2 times daily        calcium citrate-vitamin D (CITRACAL) 315-250 MG-UNIT TABS Take 2 tablets by mouth daily 120 tablet 5     hydrochlorothiazide (MICROZIDE) 12.5 MG capsule Take 1 capsule (12.5 mg) by mouth daily 90 capsule 3     estradiol (ESTRACE) 1 MG tablet Take 1 tablet (1 mg) by mouth daily 90 tablet 3     levETIRAcetam (KEPPRA) 500 MG tablet Take 1 tablet (500 mg) by mouth 2 times daily 180 tablet 1     traZODone (DESYREL) 100 MG tablet Take 1 tablet (100 mg) by mouth At Bedtime 90 tablet 3     aspirin EC 81 MG EC tablet Take 1 tablet (81 mg) by mouth daily 90 tablet 3     clopidogrel (PLAVIX) 75 MG tablet Take 1 tablet (75 mg) by mouth daily (Patient taking differently: Take 75 mg by mouth At Bedtime ) 90 tablet 3     blood glucose monitoring (ONE TOUCH ULTRA 2) meter device kit Use to test blood sugars 3 times daily or as directed. 1 kit 0     blood  glucose monitoring (ONE TOUCH ULTRASOFT) lancets Use to test blood sugar 3 times daily or as directed. 3 Box 3     phenytoin 200 MG CAPS Take 200 mg by mouth 2 times daily (Patient taking differently: Take 200 mg by mouth 2 times daily (takes at 8 AM and 8 PM)) 60 capsule 0     dorzolamide (TRUSOPT) 2 % ophthalmic solution Place 1 drop into both eyes 2 times daily        SPIRIVA HANDIHALER 18 MCG inhalation capsule INHALE THE CONTENTS OF 1 CAPSULE VIA INHALATION DEVICE DAILY 30 capsule 2     zinc 50 MG TABS Take 1 tablet by mouth daily       nitroglycerin (NITROSTAT) 0.4 MG SL tablet Place 1 tablet (0.4 mg) under the tongue every 5 minutes as needed for chest pain if you are still having symptoms after 3 doses (15 minutes) call 911. 25 tablet 1     MEDICATION GIVEN BY INTRATHECAL PUMP - INSTRUCTION continuous 4/11/2016 per [facility] in [place] [phone]:  Conc: Bupivacaine 20 mg/mL and Fentanyl 2000 mcg/mL.  Continuous: Bupivacaine 5.052 mg/day and Fentanyl 505.2 mcg/day.  Boluses: Up to 7 boluses per 24-hr period of Bupivicaine 0.5992 mg and Fentanyl 59.9 mcg  Max daily dose: Bupivacaine 9.1973 mg/day and Fentanyl 919.5883 mcg/day    Pump Last Fill Date:  6/30/2016  Pump Refill Date: 9/20/2016       atorvastatin (LIPITOR) 40 MG tablet Take 1 tablet (40 mg) by mouth daily (Patient taking differently: Take 40 mg by mouth At Bedtime ) 90 tablet 3     order for DME Equipment being ordered: Glucerna daily shakes with each meal 1 Box 11     prochlorperazine (COMPAZINE) 10 MG tablet Take 1 tablet (10 mg) by mouth every 8 hours as needed 20 tablet 0     thin (NO BRAND SPECIFIED) lancets Use to test blood sugar 3 times daily or as directed. 1 Box 10     ORDER FOR DME Equipment being ordered: Nebulizer and supplies 1 Units 0     ORDER FOR DME Equipment being ordered: Power Wheelchair 1 Device 0     ORDER FOR DME Equipment being ordered: Depends briefs 1 Month 11     EASY TOUCH LANCETS  30G/TWIST MISC        Cholecalciferol (VITAMIN D) 2000 UNITS tablet Take 2,000 Units by mouth daily. 100 tablet 3     Multiple Vitamin (MULTIVITAMIN OR) Take 1 tablet by mouth daily        latanoprost (XALATAN) 0.005 % ophthalmic solution Place 1 drop into both eyes At Bedtime 2.5 mL 11     Family History reviwed  Strong family history of type 2 diabetes in both parents, grandparents, cousin and sister  Stroke and heart disease in her parents    Social History - reviewed  Social History   Substance Use Topics     Smoking status: Former Smoker     Packs/day: 2.50     Years: 30.00     Types: Cigarettes, Cigars     Quit date: 11/1/2000     Smokeless tobacco: Never Used     Alcohol use No     Now on disability  Stay at assisted living facility with her , has home care nurse visit once a week    ROS  Constitutional: stable weight, +fatigue  Eyes: partial blindness right and left eyes, poor eyes sight, could not see well particularly at night.  Cardiovascular: no chest pain, palpitations  Respiratory: no dyspnea, cough, shortness of breath  GI: no nausea, vomiting, intermittent diarrhea, no constipation, no abdominal pain   : no change in urine, no dysuria or hematuria  Musculoskeletal: tingling and numbness in her feet and hands  Integumentary: no concerning lesions   Neuro: +numbness in both feet and hands, no tremor, no dizziness  Endo: +sensitive to both hot and cold weather   Heme/Lymph: +UGI bleeding from severe esophagitis and gastritis 9/17/16  Allergy: no environmental allergies   Psych: could not sleep well due to feet pain, and back pain    Physical Exam  /79  Pulse 76  There is no height or weight on file to calculate BMI.  Constitutional: no distress, comfortable, pleasant   Eyes: anicteric  Musculoskeletal: no edema, left AKA amputation  Skin: no jaundice   Neurological: no tremor, in the motorized wheelchair due to leg and back pain  Psychological: appropriate mood        RESULTS  A1C 4.8 5/12/17  A1C 5.0 9/30/16  A1C      4.9   6/6/2016  A1C 5.2 2/5/2016  A1C  4.8  8/11/2015  A1C  5.1 6/30/15  A1C      5.3  1/20/15  A1C      4.8  10/21/14  A1C      5.2   4/1/2014  A1C      5.4   11/9/2013  A1C      5.2   10/29/2013  A1C      4.7   5/17/2013  A1C      4.9   3/13/2013  ENDO DIABETES Latest Ref Rng 2/5/2016   MICROALBUMIN URINE  6   MICROALBUMIN MG/G CR 0 - 25 mg/g Cr 10.72     ENDO DIABETES Latest Ref Rng 6/6/2016   CHOLESTEROL <200 mg/dL 155   LDL CHOLESTEROL, CALCULATED <100 mg/dL 77   HDL CHOLESTEROL >49 mg/dL 65   VLDL-CHOLESTEROL 0 - 30 mg/dL    NON HDL CHOLESTEROL <130 mg/dL 89   TRIGLYCERIDES <150 mg/dL 62     DXA 7/3/15  Lumbar Spine (L1-L4) T-score: -2.4 Degenerative and/or osteosclerotic changes are present, falsely improving result.   Left Femoral Neck T-score: -1.9  Right Femoral Neck T-score: -2.2    Lumbar (L1-L4) BMD: 0.900 Previous: 0.948   Total Hip Mean BMD: 0.739 Previous: 0.684    Patient has had a previous DXA performed on a different Lunar machine on 11/12. Unfortunately, method for data collection at that time is not adequate for comparison.    IMPRESSION  Osteoporosis (based on T-score and degenerative changes)  Degenerative changes of the spine    ASSESSMENT:    Sonya Foote is a 67 years old female who has multiple medical history mainly type 2 diabetes, strokex3, coronary artery disease, left carotid artery stenosis s/p stent, peripheral artery disease s/p stent and bilateral AKA amputation and frequent fall. She is here today to follow up type 2 diabetes.    1) type 2 diabetes with peripheral neuropathy and macrovascular complications (PVD, CAD, CVA):  A1C today is 4.8%. I don't have fingerstick glucoses to review  - agree to hold metformin    2) osteoporosis: diagnosed based on DXA in 2012, T-score of -2.6 at left femoral neck. She has been on alendronate 70 mg weekly since 2012. Repeated DXA 7/2015 showed improving of hip BMD.   - agree to hold  alendronate  - re-evaluate with DXA this year.  - continue with calcium and vitamin D intake.    3) hx of stroke, CAD, peripheral artery disease s/p stent and left AKA    4) hx of upper GI bleeding due to esophagitis and gastritis      PLAN:   - recommend for DXA this year  - continue with diet modification   - advise on cutting down high carbohydrate diet   - continue to check blood glucose 1-2 times per day  - RTC in 6 months    Michelle Irizarry MD  Endocrinology  [phone]    CC: Dr.Mark Casey

## 2017-05-12 NOTE — LETTER
5/12/2017       RE: Sonya Foote  [address]   [place] [id number]     Dear Colleague,    Thank you for referring your patient, Sonya Foote, to the [facility] ENDOCRINOLOGY at [facility]. Please see a copy of my visit note below.         Endocrinology Note         Sonya is a 67 year old female presents today for type 2 diabetes    HPI  Sonya Foote is a 67 years old female who has multiple medical history mainly type 2 diabetes with neuropathy, s/p bilateral above knee amputation, stroke x3, coronary artery disease, left carotid artery stenosis s/p stent, peripheral artery disease s/p stents and frequent fall. She is here today to follow up type 2 diabetes. She has been diagnosed with type 2 diabetes for 8 years. Prior to that, she was prediabetes for many years.     Interval history:  Last visit with me was in 9/2016. Since the last visit, she had right knee amputation due to infection and gangrene. She did have left above knee amputation in June 2016. She is now wheelchair bound but working with physical therapist for transferring her place to place. She is currently living at assisted living and the nurse prepared all medication and checked blood glucose for her.    For diabetes, A1C today is 4.8% (Hb of 12). I don't have glucose meter to review today. Her PCP stopped metformin a few months ago due to normal glucose level.     DM complications:  Retinopathy: right eye - glaucoma, left eye partial blindness   Nephropathy: Cr 0.5 (9/2016), microalbumin normal (2/2016)  Neuropathy: numbness and tingling bilateral feet and hands - on gabapentin  On ASA and plavix  On atorvastatin 40 mg, LDL 77 (6/6/16)    She also has osteoporosis. DXA in 11/2012 showed T-score of -2.6 (BMD 0.684) at left femoral neck. DXA in 7/2015 showed lumbar spine (L1-L4) T-score: -2.4 Degenerative and/or osteosclerotic changes are present, falsely improving result and left femoral neck T-score  was -1.9, right femoral neck T-score was -2.2. She has been on alendronate 70 mg weekly since 2012 but stopped a few weeks ago. She takes 2000 IU of vitamin D3. She eats cheese regularly.     Past Medical History  Past Medical History:   Diagnosis Date     Anemia      Antiplatelet or antithrombotic long-term use      Arthritis      AS (sickle cell trait) (H) 10/8/2013     Blind left eye      BPPV (benign paroxysmal positional vertigo)      Chronic back pain      COPD (chronic obstructive pulmonary disease) (H)      Coronary artery disease      CVA (cerebral infarction) 10/8/2012    x3, residual left sided weakness     Diastolic CHF (H) 9/4/2012     Dilantin toxicity 5/31/2013     Esophageal candidiasis (H)      GERD (gastroesophageal reflux disease)      Heart attack (H)      Hernia, abdominal      History of blood transfusion     x5     History of thrombophlebitis      HTN (hypertension) 9/4/2012     Hyperlipidemia LDL goal <100 9/4/2012     Legally blind in right eye, as defined in [place]     No periphal vision right eye     Osteoporosis 1/21/2013     Other chronic pain      PAD (peripheral artery disease) (H)      Palpitations      Person who has had sex change operation 1/20/2014     Pneumonia      Schizoaffective disorder (H)      Seizure disorder (H) 9/4/2012     Seizures (H)      Sleep apnea     CPAP     Thrombosis of leg      Type 2 diabetes, HbA1C goal < 8% (H) 9/4/2012     Uncomplicated asthma      Unspecified cerebral artery occlusion with cerebral infarction    - hx of UGI bleeding secondary to severe esophagitis and gastric ulcer  - Right common femoral artery, SFA, and popliteal artery balloon athrectomy and angioplasty and right SFA stent placement 9/14/15.  - bilateral above knee amputation    Allergies  Allergies   Allergen Reactions     Bee Venom      Penicillins Anaphylaxis     Other reaction(s): Skin Rash and/or Hives     Dilantin [Phenytoin] Other (See Comments)     Generic dilantin only per pt      Iodide Hives     09/11/15: Pt states she can use iodine and doesn't have any problems      Iodine-131      Novocaine [Procaine] Hives     Other reaction(s): Skin Rash and/or Hives     Medications  Current Outpatient Prescriptions   Medication Sig Dispense Refill     lidocaine (LIDODERM) 5 % Patch Apply from 1 to 3 patches to painful area at once for up to 12 h within a 24 h period.  Remove after 12 hours. 30 patch 1     cyclobenzaprine (FLEXERIL) 10 MG tablet Take 0.5-1 tablets (5-10 mg) by mouth 3 times daily as needed for muscle spasms 30 tablet 1     cetirizine (ZYRTEC) 10 MG tablet Take 1 tablet (10 mg) by mouth daily as needed for allergies 90 tablet 3     escitalopram (LEXAPRO) 10 MG tablet Take 1 tablet (10 mg) by mouth daily 90 tablet 1     albuterol (VENTOLIN HFA) 108 (90 BASE) MCG/ACT Inhaler Inhale 1-2 puffs into the lungs every 6 hours as needed 1 Inhaler 11     diclofenac (VOLTAREN) 1 % GEL topical gel Apply 2 g topically 4 times daily to hands 100 g 11     EPINEPHrine (EPIPEN JR) 0.15 MG/0.3ML injection Inject 0.3 mLs (0.15 mg) into the muscle as needed for anaphylaxis 0.6 mL 1     lisinopril (PRINIVIL/ZESTRIL) 10 MG tablet Take 1 tablet (10 mg) by mouth daily 90 tablet 3     pantoprazole (PROTONIX) 40 MG EC tablet Take 40 mg by mouth daily       pregabalin (LYRICA) 100 MG capsule Take 100 mg by mouth 2 times daily       risperiDONE (RISPERDAL) 0.5 MG tablet Take 0.5 mg by mouth At Bedtime       ferrous sulfate (IRON) 325 (65 FE) MG tablet Take 1 tablet (325 mg) by mouth 2 times daily With meals 60 tablet 2     blood glucose monitoring (ONE TOUCH ULTRA) test strip Use to test blood sugars 3 times daily or as directed. 3 Box 3     order for DME Equipment being ordered: wheelchair seat cushion 1 Device 0     order for DME Equipment being ordered: CPAP supplies mask, hose, filters, cushion    fax to [facility] at [phone] 10 Device prn     sucralfate (CARAFATE) 1 GM tablet Take 1 tablet (1 g) by  mouth 4 times daily May dissolve in 10 mL water is necessary. (Start upon completion of carafate suspension) 40 tablet 5     order for DME Equipment being ordered: CPAP supplies mask, hose, filters, cushion fax to [facility] at [phone]  Disp: 10 Device Refills: prn   Class: Local Print Start: 2/10/2017 1 Device 0     order for DME Equipment being ordered: Nebulizer and tubing supplies 1 Units 0     albuterol (2.5 MG/3ML) 0.083% neb solution INHALE 1 VIAL VIA NEBULIZER EVERY 6 HOURS AS NEEDED 360 mL 11     order for DME ACcu check BID 1 Device 0     ondansetron (ZOFRAN-ODT) 4 MG ODT tab Take 1 tablet (4 mg) by mouth every 6 hours (Patient taking differently: Take 4 mg by mouth every 6 hours as needed ) 120 tablet 0     metoprolol (TOPROL-XL) 25 MG 24 hr tablet Take 1 tablet (25 mg) by mouth daily 30 tablet 0     CYANOCOBALAMIN PO Take 2,000 mcg by mouth daily       Nutritional Supplements (RENÉE) PACK Take 1 packet by mouth 2 times daily       polyethylene glycol (MIRALAX/GLYCOLAX) powder Take 17 g by mouth daily       HYDROmorphone (DILAUDID) 2 MG tablet Take 1 tablet (2 mg) by mouth every 3 hours as needed for moderate to severe pain 30 tablet 0     fluticasone (FLONASE) 50 MCG/ACT nasal spray Spray 1 spray into both nostrils daily       ADVAIR DISKUS 250-50 MCG/DOSE diskus inhaler Inhale 1 puff into the lungs 2 times daily        calcium citrate-vitamin D (CITRACAL) 315-250 MG-UNIT TABS Take 2 tablets by mouth daily 120 tablet 5     hydrochlorothiazide (MICROZIDE) 12.5 MG capsule Take 1 capsule (12.5 mg) by mouth daily 90 capsule 3     estradiol (ESTRACE) 1 MG tablet Take 1 tablet (1 mg) by mouth daily 90 tablet 3     levETIRAcetam (KEPPRA) 500 MG tablet Take 1 tablet (500 mg) by mouth 2 times daily 180 tablet 1     traZODone (DESYREL) 100 MG tablet Take 1 tablet (100 mg) by mouth At Bedtime 90 tablet 3     aspirin EC 81 MG EC tablet Take 1 tablet (81 mg) by mouth daily 90 tablet 3     clopidogrel  (PLAVIX) 75 MG tablet Take 1 tablet (75 mg) by mouth daily (Patient taking differently: Take 75 mg by mouth At Bedtime ) 90 tablet 3     blood glucose monitoring (ONE TOUCH ULTRA 2) meter device kit Use to test blood sugars 3 times daily or as directed. 1 kit 0     blood glucose monitoring (ONE TOUCH ULTRASOFT) lancets Use to test blood sugar 3 times daily or as directed. 3 Box 3     phenytoin 200 MG CAPS Take 200 mg by mouth 2 times daily (Patient taking differently: Take 200 mg by mouth 2 times daily (takes at 8 AM and 8 PM)) 60 capsule 0     dorzolamide (TRUSOPT) 2 % ophthalmic solution Place 1 drop into both eyes 2 times daily        SPIRIVA HANDIHALER 18 MCG inhalation capsule INHALE THE CONTENTS OF 1 CAPSULE VIA INHALATION DEVICE DAILY 30 capsule 2     zinc 50 MG TABS Take 1 tablet by mouth daily       nitroglycerin (NITROSTAT) 0.4 MG SL tablet Place 1 tablet (0.4 mg) under the tongue every 5 minutes as needed for chest pain if you are still having symptoms after 3 doses (15 minutes) call 911. 25 tablet 1     MEDICATION GIVEN BY INTRATHECAL PUMP - INSTRUCTION continuous 4/11/2016 per [facility] in [place] [phone]:  Conc: Bupivacaine 20 mg/mL and Fentanyl 2000 mcg/mL.  Continuous: Bupivacaine 5.052 mg/day and Fentanyl 505.2 mcg/day.  Boluses: Up to 7 boluses per 24-hr period of Bupivicaine 0.5992 mg and Fentanyl 59.9 mcg  Max daily dose: Bupivacaine 9.1973 mg/day and Fentanyl 919.5883 mcg/day    Pump Last Fill Date:  6/30/2016  Pump Refill Date: 9/20/2016       atorvastatin (LIPITOR) 40 MG tablet Take 1 tablet (40 mg) by mouth daily (Patient taking differently: Take 40 mg by mouth At Bedtime ) 90 tablet 3     order for DME Equipment being ordered: Glucerna daily shakes with each meal 1 Box 11     prochlorperazine (COMPAZINE) 10 MG tablet Take 1 tablet (10 mg) by mouth every 8 hours as needed 20 tablet 0     thin (NO BRAND SPECIFIED) lancets Use to test blood sugar 3 times daily  or as directed. 1 Box 10     ORDER FOR DME Equipment being ordered: Nebulizer and supplies 1 Units 0     ORDER FOR DME Equipment being ordered: Power Wheelchair 1 Device 0     ORDER FOR DME Equipment being ordered: Depends briefs 1 Month 11     EASY TOUCH LANCETS 30G/TWIST MISC        Cholecalciferol (VITAMIN D) 2000 UNITS tablet Take 2,000 Units by mouth daily. 100 tablet 3     Multiple Vitamin (MULTIVITAMIN OR) Take 1 tablet by mouth daily        latanoprost (XALATAN) 0.005 % ophthalmic solution Place 1 drop into both eyes At Bedtime 2.5 mL 11     Family History reviwed  Strong family history of type 2 diabetes in both parents, grandparents, cousin and sister  Stroke and heart disease in her parents    Social History - reviewed  Social History   Substance Use Topics     Smoking status: Former Smoker     Packs/day: 2.50     Years: 30.00     Types: Cigarettes, Cigars     Quit date: 11/1/2000     Smokeless tobacco: Never Used     Alcohol use No     Now on disability  Stay at assisted living facility with her , has home care nurse visit once a week    ROS  Constitutional: stable weight, +fatigue  Eyes: partial blindness right and left eyes, poor eyes sight, could not see well particularly at night.  Cardiovascular: no chest pain, palpitations  Respiratory: no dyspnea, cough, shortness of breath  GI: no nausea, vomiting, intermittent diarrhea, no constipation, no abdominal pain   : no change in urine, no dysuria or hematuria  Musculoskeletal: tingling and numbness in her feet and hands  Integumentary: no concerning lesions   Neuro: +numbness in both feet and hands, no tremor, no dizziness  Endo: +sensitive to both hot and cold weather   Heme/Lymph: +UGI bleeding from severe esophagitis and gastritis 9/17/16  Allergy: no environmental allergies   Psych: could not sleep well due to feet pain, and back pain    Physical Exam  /79  Pulse 76  There is no height or weight on file to calculate  BMI.  Constitutional: no distress, comfortable, pleasant   Eyes: anicteric  Musculoskeletal: no edema, left AKA amputation  Skin: no jaundice   Neurological: no tremor, in the motorized wheelchair due to leg and back pain  Psychological: appropriate mood       RESULTS  A1C 4.8 5/12/17  A1C 5.0 9/30/16  A1C      4.9   6/6/2016  A1C 5.2 2/5/2016  A1C  4.8  8/11/2015  A1C  5.1 6/30/15  A1C      5.3  1/20/15  A1C      4.8  10/21/14  A1C      5.2   4/1/2014  A1C      5.4   11/9/2013  A1C      5.2   10/29/2013  A1C      4.7   5/17/2013  A1C      4.9   3/13/2013  ENDO DIABETES Latest Ref Rng 2/5/2016   MICROALBUMIN URINE  6   MICROALBUMIN MG/G CR 0 - 25 mg/g Cr 10.72     ENDO DIABETES Latest Ref Rng 6/6/2016   CHOLESTEROL <200 mg/dL 155   LDL CHOLESTEROL, CALCULATED <100 mg/dL 77   HDL CHOLESTEROL >49 mg/dL 65   VLDL-CHOLESTEROL 0 - 30 mg/dL    NON HDL CHOLESTEROL <130 mg/dL 89   TRIGLYCERIDES <150 mg/dL 62     DXA 7/3/15  Lumbar Spine (L1-L4) T-score: -2.4 Degenerative and/or osteosclerotic changes are present, falsely improving result.   Left Femoral Neck T-score: -1.9  Right Femoral Neck T-score: -2.2    Lumbar (L1-L4) BMD: 0.900 Previous: 0.948   Total Hip Mean BMD: 0.739 Previous: 0.684    Patient has had a previous DXA performed on a different [employer] machine on 11/12. Unfortunately, method for data collection at that time is not adequate for comparison.    IMPRESSION  Osteoporosis (based on T-score and degenerative changes)  Degenerative changes of the spine    ASSESSMENT:    Sonya Foote is a 67 years old female who has multiple medical history mainly type 2 diabetes, strokex3, coronary artery disease, left carotid artery stenosis s/p stent, peripheral artery disease s/p stent and bilateral AKA amputation and frequent fall. She is here today to follow up type 2 diabetes.    1) type 2 diabetes with peripheral neuropathy and macrovascular complications (PVD, CAD, CVA):  A1C today is 4.8%. I don't have fingerstick  glucoses to review  - agree to hold metformin    2) osteoporosis: diagnosed based on DXA in 2012, T-score of -2.6 at left femoral neck. She has been on alendronate 70 mg weekly since 2012. Repeated DXA 7/2015 showed improving of hip BMD.   - agree to hold alendronate  - re-evaluate with DXA this year.  - continue with calcium and vitamin D intake.    3) hx of stroke, CAD, peripheral artery disease s/p stent and left AKA    4) hx of upper GI bleeding due to esophagitis and gastritis      PLAN:   - recommend for DXA this year  - continue with diet modification   - advise on cutting down high carbohydrate diet   - continue to check blood glucose 1-2 times per day  - RTC in 6 months    Michelle Irizarry MD  Endocrinology  [phone]    CC: Dr.Mark Casey

## 2017-05-12 NOTE — MR AVS SNAPSHOT
After Visit Summary   5/12/2017    Sonya Fotoe    MRN: [id number]           Patient Information     Date Of Birth          1950        Visit Information        Provider Department      5/12/2017 10:50 AM Michelle Irizarry MD [facility] Endocrinology        Care Instructions    To expedite your medication refill(s), please contact your pharmacy and have them fax a refill request to: [phone].  *Please allow 3 business days for routine medication refills.  *Please allow 5 business days for controlled substance medication refills.  --------------------  For scheduling appointments (including lab work), please request an appointment through [employer], or call: [phone].    For questions for your provider or the endocrine nurse, please send a [employer] message.  For after-hours urgent issues, please dial [phone], and ask to speak with the Endocrinologist On-Call.  --------------------  Please Note: If you are active on [employer], all future test results will be sent by [employer] message only and will no longer be sent by mail. You may also receive communication directly from your physician.          Follow-ups after your visit        Your next 10 appointments already scheduled     May 18, 2017  1:00 PM CDT   (Arrive by 12:45 PM)   Cystoscopy with Elizabeth Oliver MD   [facility] ([facility])    [address] [place] [id number] [phone]            Jun 13, 2017  2:00 PM CDT   Return Sleep Patient with Guanaco Kowalski MD   [facility] ([facility])    [address] [address] [id number] [phone]            Jul 10, 2017 11:00 AM CDT   (Arrive by 10:45 AM)   Return Visit with Alina Jennings MD   [facility] ([facility])    [address] [place] [address]  [address] [facility] [address]   [phone]            Sep 08, 2017 10:00 AM CDT   (Arrive by 9:45 AM)   New Patient Visit with VICTOR M Floyd CNP   [facility] ([facility])    [address]  [address] [facility] [address]   [phone]            Nov 10, 2017  9:20 AM CST   (Arrive by 9:05 AM)   RETURN DIABETES with MD ANALY [facility] ([facility])    [address]  [address] [facility] [address] [phone]              Who to contact     Please call your clinic at [phone] to:    Ask questions about your health    Make or cancel appointments    Discuss your medicines    Learn about your test results    Speak to your doctor   If you have compliments or concerns about an experience at your clinic, or if you wish to file a complaint, please contact [facility] Patient Relations at [phone] or email us at [email or web address]         Additional Information About Your Visit        [employer] Information     [employer] is an electronic gateway that provides easy, online access to your medical records. With [employer], you can request a clinic appointment, read your test results, renew a prescription or communicate with your care team.     To sign up for [employer] visit the website at [email or web address]   You will be asked to enter the access code listed below, as well as some personal information. Please follow the directions to create your username and password.     Your access code is: [email or web address]  Expires: 2017 11:52 AM     Your access code will  in 90 days. If you need help or a new code, please contact your [facility] or call [phone] for assistance.        Care EveryWhere ID     This is your Care EveryWhere ID. This could be used by other organizations to access your  [place] medical records  [id number]        Your Vitals Were     Pulse                   76            Blood Pressure from Last 3 Encounters:   05/12/17 123/79   05/10/17 124/72   05/02/17 131/69    Weight from Last 3 Encounters:   05/10/17 60.3 kg (133 lb)   05/02/17 60.3 kg (133 lb)   04/26/17 60.3 kg (133 lb)              Today, you had the following     No orders found for display         Today's Medication Changes          These changes are accurate as of: 5/12/17 11:04 AM.  If you have any questions, ask your nurse or doctor.               These medicines have changed or have updated prescriptions.        Dose/Directions    atorvastatin 40 MG tablet   Commonly known as:  LIPITOR   This may have changed:  when to take this   Used for:  Hyperlipidemia LDL goal <100        Dose:  40 mg   Take 1 tablet (40 mg) by mouth daily   Quantity:  90 tablet   Refills:  3       clopidogrel 75 MG tablet   Commonly known as:  PLAVIX   This may have changed:  when to take this   Used for:  PAD (peripheral artery disease) (H), UGI bleed, Osteoporosis, Nausea        Dose:  75 mg   Take 1 tablet (75 mg) by mouth daily   Quantity:  90 tablet   Refills:  3       ondansetron 4 MG ODT tab   Commonly known as:  ZOFRAN-ODT   This may have changed:    - when to take this  - reasons to take this   Used for:  Intractable vomiting with nausea, unspecified vomiting type        Dose:  4 mg   Take 1 tablet (4 mg) by mouth every 6 hours   Quantity:  120 tablet   Refills:  0       Phenytoin Sodium Extended 200 MG Caps   This may have changed:  additional instructions   Used for:  Seizure disorder (H)        Dose:  200 mg   Take 200 mg by mouth 2 times daily   Quantity:  60 capsule   Refills:  0                Primary Care Provider Office Phone # Fax #    Allan Casey [phone]       [facility] [address] [place] [id number]        Thank you!     Thank you for choosing [facility] ENDOCRINOLOGY  for your  care. Our goal is always to provide you with excellent care. Hearing back from our patients is one way we can continue to improve our services. Please take a few minutes to complete the written survey that you may receive in the mail after your visit with us. Thank you!             Your Updated Medication List - Protect others around you: Learn how to safely use, store and throw away your medicines at www.disposemymeds.org.          This list is accurate as of: 5/12/17 11:04 AM.  Always use your most recent med list.                   Brand Name Dispense Instructions for use    ADVAIR DISKUS 250-50 MCG/DOSE diskus inhaler   Generic drug:  fluticasone-salmeterol      Inhale 1 puff into the lungs 2 times daily       * albuterol (2.5 MG/3ML) 0.083% neb solution     360 mL    INHALE 1 VIAL VIA NEBULIZER EVERY 6 HOURS AS NEEDED       * albuterol 108 (90 BASE) MCG/ACT Inhaler    VENTOLIN HFA    1 Inhaler    Inhale 1-2 puffs into the lungs every 6 hours as needed       aspirin 81 MG EC tablet     90 tablet    Take 1 tablet (81 mg) by mouth daily       atorvastatin 40 MG tablet    LIPITOR    90 tablet    Take 1 tablet (40 mg) by mouth daily       blood glucose monitoring meter device kit     1 kit    Use to test blood sugars 3 times daily or as directed.       blood glucose monitoring test strip    ONE TOUCH ULTRA    3 Box    Use to test blood sugars 3 times daily or as directed.       calcium citrate-vitamin D 315-250 MG-UNIT Tabs per tablet    CITRACAL    120 tablet    Take 2 tablets by mouth daily       cetirizine 10 MG tablet    zyrTEC    90 tablet    Take 1 tablet (10 mg) by mouth daily as needed for allergies       clopidogrel 75 MG tablet    PLAVIX    90 tablet    Take 1 tablet (75 mg) by mouth daily       CYANOCOBALAMIN PO      Take 2,000 mcg by mouth daily       cyclobenzaprine 10 MG tablet    FLEXERIL    30 tablet    Take 0.5-1 tablets (5-10 mg) by mouth 3 times daily as needed for muscle spasms       diclofenac 1  % Gel topical gel    VOLTAREN    100 g    Apply 2 g topically 4 times daily to hands       dorzolamide 2 % ophthalmic solution    TRUSOPT     Place 1 drop into both eyes 2 times daily       * EASY TOUCH LANCETS 30G/TWIST Misc          * thin lancets    NO BRAND SPECIFIED    1 Box    Use to test blood sugar 3 times daily or as directed.       * blood glucose monitoring lancets     3 Box    Use to test blood sugar 3 times daily or as directed.       EPINEPHrine 0.15 MG/0.3ML injection    EPIPEN JR    0.6 mL    Inject 0.3 mLs (0.15 mg) into the muscle as needed for anaphylaxis       escitalopram 10 MG tablet    LEXAPRO    90 tablet    Take 1 tablet (10 mg) by mouth daily       estradiol 1 MG tablet    ESTRACE    90 tablet    Take 1 tablet (1 mg) by mouth daily       ferrous sulfate 325 (65 FE) MG tablet    IRON    60 tablet    Take 1 tablet (325 mg) by mouth 2 times daily With meals       fluticasone 50 MCG/ACT spray    FLONASE     Spray 1 spray into both nostrils daily       hydrochlorothiazide 12.5 MG capsule    MICROZIDE    90 capsule    Take 1 capsule (12.5 mg) by mouth daily       HYDROmorphone 2 MG tablet    DILAUDID    30 tablet    Take 1 tablet (2 mg) by mouth every 3 hours as needed for moderate to severe pain       RENÉE Pack      Take 1 packet by mouth 2 times daily       latanoprost 0.005 % ophthalmic solution    XALATAN    2.5 mL    Place 1 drop into both eyes At Bedtime       levETIRAcetam 500 MG tablet    KEPPRA    180 tablet    Take 1 tablet (500 mg) by mouth 2 times daily       lidocaine 5 % Patch    LIDODERM    30 patch    Apply from 1 to 3 patches to painful area at once for up to 12 h within a 24 h period.  Remove after 12 hours.       lisinopril 10 MG tablet    PRINIVIL/ZESTRIL    90 tablet    Take 1 tablet (10 mg) by mouth daily       LYRICA 100 MG capsule   Generic drug:  pregabalin      Take 100 mg by mouth 2 times daily       MEDICATION GIVEN BY INTRATHECAL PUMP - INSTRUCTION      continuous  4/11/2016 per [facility] in [place] [phone]: Conc: Bupivacaine 20 mg/mL and Fentanyl 2000 mcg/mL. Continuous: Bupivacaine 5.052 mg/day and Fentanyl 505.2 mcg/day. Boluses: Up to 7 boluses per 24-hr period of Bupivicaine 0.5992 mg and Fentanyl 59.9 mcg Max daily dose: Bupivacaine 9.1973 mg/day and Fentanyl 919.5883 mcg/day  Pump Last Fill Date:  6/30/2016 Pump Refill Date: 9/20/2016       metoprolol 25 MG 24 hr tablet    TOPROL-XL    30 tablet    Take 1 tablet (25 mg) by mouth daily       MULTIVITAMIN PO      Take 1 tablet by mouth daily       nitroglycerin 0.4 MG sublingual tablet    NITROSTAT    25 tablet    Place 1 tablet (0.4 mg) under the tongue every 5 minutes as needed for chest pain if you are still having symptoms after 3 doses (15 minutes) call 911.       ondansetron 4 MG ODT tab    ZOFRAN-ODT    120 tablet    Take 1 tablet (4 mg) by mouth every 6 hours       * order for DME     1 Month    Equipment being ordered: Depends briefs       * order for DME     1 Device    Equipment being ordered: Power Wheelchair       * order for DME     1 Units    Equipment being ordered: Nebulizer and supplies       * order for DME     1 Box    Equipment being ordered: Glucerna daily shakes with each meal       * order for DME     1 Device    ACcu check BID       * order for DME     1 Units    Equipment being ordered: Nebulizer and tubing supplies       * order for DME     1 Device    Equipment being ordered: CPAP supplies mask, hose, filters, cushion fax to [facility] at [phone] Disp: 10 DeviceRefills: prn Class: Local PrintStart: 2/10/2017       * order for DME     10 Device    Equipment being ordered: CPAP supplies mask, hose, filters, cushion  fax to [facility] at [phone]       * order for DME     1 Device    Equipment being ordered: wheelchair seat cushion       pantoprazole 40 MG EC tablet    PROTONIX     Take 40 mg by mouth daily       Phenytoin Sodium Extended 200 MG  Caps     60 capsule    Take 200 mg by mouth 2 times daily       polyethylene glycol powder    MIRALAX/GLYCOLAX     Take 17 g by mouth daily       prochlorperazine 10 MG tablet    COMPAZINE    20 tablet    Take 1 tablet (10 mg) by mouth every 8 hours as needed       risperiDONE 0.5 MG tablet    risperDAL     Take 0.5 mg by mouth At Bedtime       SPIRIVA HANDIHALER 18 MCG capsule   Generic drug:  tiotropium     30 capsule    INHALE THE CONTENTS OF 1 CAPSULE VIA INHALATION DEVICE DAILY       sucralfate 1 GM tablet    CARAFATE    40 tablet    Take 1 tablet (1 g) by mouth 4 times daily May dissolve in 10 mL water is necessary. (Start upon completion of carafate suspension)       traZODone 100 MG tablet    DESYREL    90 tablet    Take 1 tablet (100 mg) by mouth At Bedtime       vitamin D 2000 UNITS tablet     100 tablet    Take 2,000 Units by mouth daily.       zinc 50 MG Tabs      Take 1 tablet by mouth daily       * Notice:  This list has 14 medication(s) that are the same as other medications prescribed for you. Read the directions carefully, and ask your doctor or other care provider to review them with you.

## 2017-05-12 NOTE — PATIENT INSTRUCTIONS
To expedite your medication refill(s), please contact your pharmacy and have them fax a refill request to: [phone].  *Please allow 3 business days for routine medication refills.  *Please allow 5 business days for controlled substance medication refills.  --------------------  For scheduling appointments (including lab work), please request an appointment through [employer], or call: [phone].    For questions for your provider or the endocrine nurse, please send a [employer] message.  For after-hours urgent issues, please dial [phone], and ask to speak with the Endocrinologist On-Call.  --------------------  Please Note: If you are active on [employer], all future test results will be sent by [employer] message only and will no longer be sent by mail. You may also receive communication directly from your physician.

## 2017-05-15 ENCOUNTER — HOSPITAL ENCOUNTER (OUTPATIENT)
Facility: CLINIC | Age: 67
Setting detail: SPECIMEN
Discharge: HOME | End: 2017-05-15
Payer: COMMERCIAL

## 2017-05-15 ENCOUNTER — OFFICE VISIT (OUTPATIENT)
Dept: INTERNAL MEDICINE | Facility: CLINIC | Age: 67
End: 2017-05-15
Payer: COMMERCIAL

## 2017-05-15 VITALS — HEART RATE: 66 BPM | SYSTOLIC BLOOD PRESSURE: 130 MMHG | DIASTOLIC BLOOD PRESSURE: 78 MMHG | OXYGEN SATURATION: 98 %

## 2017-05-15 DIAGNOSIS — G40.909 SEIZURE DISORDER (H): Primary | ICD-10-CM

## 2017-05-15 DIAGNOSIS — M54.2 CERVICALGIA: ICD-10-CM

## 2017-05-15 DIAGNOSIS — Z12.31 VISIT FOR SCREENING MAMMOGRAM: Primary | ICD-10-CM

## 2017-05-15 LAB — PHENYTOIN SERPL-MCNC: 7.3 MG/L (ref 10–20)

## 2017-05-15 PROCEDURE — 36415 COLL VENOUS BLD VENIPUNCTURE: CPT | Performed by: INTERNAL MEDICINE

## 2017-05-15 PROCEDURE — 99213 OFFICE O/P EST LOW 20 MIN: CPT | Performed by: INTERNAL MEDICINE

## 2017-05-15 PROCEDURE — 80185 ASSAY OF PHENYTOIN TOTAL: CPT | Performed by: INTERNAL MEDICINE

## 2017-05-15 PROCEDURE — 80186 ASSAY OF PHENYTOIN FREE: CPT

## 2017-05-15 NOTE — PROGRESS NOTES
SUBJECTIVE:                                                    Soyna Foote is a 67 year old female who presents to clinic today for the following health issues:    Patient requesting to update Hgb, needs new orders for mammogram to be completed at [facility] center because of lack of space at clinic.     Follow up on neck pain from 5/10/17, pt is still waiting on PA to receive Cyclobenzaprine- using heating pad on neck currently.  States he neck pain is quite severe and reports constatnt pain and is difficult to take a history from.  States started after fall.  Noted Xrays with some limited view.    Hemoglobin   Date Value Ref Range Status   04/19/2017 12.0 11.7 - 15.7 g/dL Final     Problem list and histories reviewed & adjusted, as indicated.  Additional history: as documented    Patient Active Problem List   Diagnosis     Hyperlipidemia LDL goal <100     Seizure disorder (H)     ACP (advance care planning)     Osteoporosis     Schizoaffective disorder (H)     AS (sickle cell trait) (H)     Vertigo     Person who has had sex change operation     Claudication in peripheral vascular disease (H)     Intestinal malabsorption     GIB (gastrointestinal bleeding)     Cervicalgia     Health Care Home     Asthma     Adjustment disorder with depressed mood     Chronic pain syndrome     Open-angle glaucoma     History of colonic polyps     Old myocardial infarction     Iron deficiency anemia     Late effects of cerebrovascular disease     Degeneration of intervertebral disc of lumbosacral region     Thoracic or lumbosacral neuritis or radiculitis     Cerebral infarction due to occlusion or stenosis of carotid artery     Disorder of bone and cartilage     Hereditary and idiopathic peripheral neuropathy     Androgen insensitivity syndrome     PAD (peripheral artery disease) (H)     Chronic systolic congestive heart failure (H)     Carotid stenosis, left     Primary osteoarthritis of both hands     Pain in both upper extremities      Atherosclerotic peripheral vascular disease with rest pain (H)     Essential hypertension with goal blood pressure less than 130/80     Cellulitis of right ankle     Angina pectoris, crescendo (H)     Type 2 diabetes mellitus with diabetic peripheral angiopathy without gangrene, without long-term current use of insulin (H)     Anemia, unspecified type     Critical lower limb ischemia     Testicular feminization     Anxiety disorder due to general medical condition     Anxiety disorder     Central retinal artery occlusion     Lumbosacral radiculitis     Peripheral nerve disease (H)     Osteopenia     Prediabetes     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 (H)     Primary open angle glaucoma of both eyes, severe stage     Pseudophakia of right eye     Cataract, left eye     Diabetes mellitus type 2 without retinopathy (H)     Pyelonephritis     Chronic obstructive pulmonary disease, unspecified COPD type (H)     JOSE (obstructive sleep apnea)     Complex sleep apnea syndrome     Coronary artery disease of native artery of native heart with stable angina pectoris (H)     Hyperlipidemia LDL goal <70     Ischemic cardiomyopathy     Bee sting reaction, undetermined intent, subsequent encounter     Past Surgical History:   Procedure Laterality Date     AMPUTATE LEG ABOVE KNEE Left 6/11/2016    Procedure: AMPUTATE LEG ABOVE KNEE;  Surgeon: Mello Rodriguez MD;  [facility]     AMPUTATE LEG BELOW KNEE Right 11/7/2016    Procedure: AMPUTATE LEG BELOW KNEE;  Surgeon: Savannah Durant MD;  [facility]     AMPUTATE REVISION STUMP LOWER EXTREMITY Right 11/11/2016    Procedure: AMPUTATE REVISION STUMP LOWER EXTREMITY;  Surgeon: Savannah Durant MD;  [facility]     AMPUTATE REVISION STUMP LOWER EXTREMITY Right 11/16/2016    Procedure: AMPUTATE REVISION STUMP LOWER EXTREMITY;  Surgeon: Savannah Durant MD;  [facility]     AMPUTATE TOE(S) Right 1/5/2016    Procedure: AMPUTATE TOE(S);  Surgeon: Eder  Mello Wing DPM;  [facility]     ANGIOGRAM Bilateral 11/21/2014    Procedure: ANGIOGRAM;  Surgeon: Savannah Durant MD;  [facility]     ANGIOGRAM Left 1/16/2015    Procedure: ANGIOGRAM;  Surgeon: Savannah Durant MD;  [facility]     ANGIOGRAM Bilateral 9/14/2015    Procedure: ANGIOGRAM;  Surgeon: Savannah Durant MD;  [facility]     ANGIOGRAM Left 10/12/2015    Procedure: ANGIOGRAM;  Surgeon: Savannah Durant MD;  [facility]     ANGIOGRAM Right 6/6/2016    Procedure: ANGIOGRAM;  Surgeon: Savannah Durant MD;  [facility]     ANGIOPLASTY Right 6/6/2016    Procedure: ANGIOPLASTY;  Surgeon: Savannah Durant MD;  [facility]     APPENDECTOMY       BREAST SURGERY      right breast bx (benign)     CHOLECYSTECTOMY       COLONOSCOPY N/A 8/25/2014    Procedure: COLONOSCOPY;  Surgeon: Mello Ferrer MD;  [facility]     COLONOSCOPY WITH CO2 INSUFFLATION N/A 8/20/2014    Procedure: COLONOSCOPY WITH CO2 INSUFFLATION;  Surgeon: Duane, William Charles, MD;  [facility]     ENDARTERECTOMY FEMORAL  5/23/2014    Procedure: ENDARTERECTOMY FEMORAL;  Surgeon: Jason Joshi MD;  Location:  OR     ESOPHAGOSCOPY, GASTROSCOPY, DUODENOSCOPY (EGD), COMBINED  12/14/2012    Procedure: COMBINED ESOPHAGOSCOPY, GASTROSCOPY, DUODENOSCOPY (EGD), BIOPSY SINGLE OR MULTIPLE;  ESOPHAGOSCOPY, GASTROSCOPY, DUODENOSCOPY (EGD), DILATATION ;  Surgeon: Elizabeth Stevenson MD;  Location:  GI     ESOPHAGOSCOPY, GASTROSCOPY, DUODENOSCOPY (EGD), COMBINED  12/31/2013    Procedure: COMBINED ESOPHAGOSCOPY, GASTROSCOPY, DUODENOSCOPY (EGD), BIOPSY SINGLE OR MULTIPLE;;  Surgeon: Clemente Lopez MD;  Location:  GI     ESOPHAGOSCOPY, GASTROSCOPY, DUODENOSCOPY (EGD), COMBINED  4/1/2014    Procedure: COMBINED ESOPHAGOSCOPY, GASTROSCOPY, DUODENOSCOPY (EGD);;  Surgeon: Clemente Lopez MD;  Location:  [facility]     ESOPHAGOSCOPY, GASTROSCOPY, DUODENOSCOPY (EGD), COMBINED  6/28/2014    Procedure: COMBINED ESOPHAGOSCOPY, GASTROSCOPY,  DUODENOSCOPY (EGD);  Surgeon: Clemente Lopez MD;  [facility]     ESOPHAGOSCOPY, GASTROSCOPY, DUODENOSCOPY (EGD), COMBINED N/A 8/20/2014    Procedure: COMBINED ESOPHAGOSCOPY, GASTROSCOPY, DUODENOSCOPY (EGD), BIOPSY SINGLE OR MULTIPLE;  Surgeon: Duane, William Charles, MD;  [facility]     ESOPHAGOSCOPY, GASTROSCOPY, DUODENOSCOPY (EGD), COMBINED N/A 8/22/2014    Procedure: COMBINED ESOPHAGOSCOPY, GASTROSCOPY, DUODENOSCOPY (EGD), BIOPSY SINGLE OR MULTIPLE;  Surgeon: Mello Ferrer MD;  [facility]     ESOPHAGOSCOPY, GASTROSCOPY, DUODENOSCOPY (EGD), COMBINED N/A 10/2/2014    Procedure: COMBINED ESOPHAGOSCOPY, GASTROSCOPY, DUODENOSCOPY (EGD), BIOPSY SINGLE OR MULTIPLE;  Surgeon: Remy Haskins MD;  [facility]     ESOPHAGOSCOPY, GASTROSCOPY, DUODENOSCOPY (EGD), COMBINED Left 12/15/2014    Procedure: COMBINED ESOPHAGOSCOPY, GASTROSCOPY, DUODENOSCOPY (EGD), BIOPSY SINGLE OR MULTIPLE;  Surgeon: Remy Haskins MD;  [facility]     ESOPHAGOSCOPY, GASTROSCOPY, DUODENOSCOPY (EGD), COMBINED N/A 2/25/2015    Procedure: COMBINED ENDOSCOPIC ULTRASOUND, ESOPHAGOSCOPY, GASTROSCOPY, DUODENOSCOPY (EGD), FINE NEEDLE ASPIRATE/BIOPSY;  Surgeon: Clemente Lugo MD;  [facility]     ESOPHAGOSCOPY, GASTROSCOPY, DUODENOSCOPY (EGD), COMBINED Left 2/25/2015    Procedure: COMBINED ESOPHAGOSCOPY, GASTROSCOPY, DUODENOSCOPY (EGD), BIOPSY SINGLE OR MULTIPLE;  Surgeon: Clemente Lugo MD;  [facility]     ESOPHAGOSCOPY, GASTROSCOPY, DUODENOSCOPY (EGD), COMBINED N/A 9/25/2016    Procedure: COMBINED ESOPHAGOSCOPY, GASTROSCOPY, DUODENOSCOPY (EGD);  Surgeon: Aziza Patiño MD;  [facility]     ESOPHAGOSCOPY, GASTROSCOPY, DUODENOSCOPY (EGD), COMBINED N/A 1/18/2017    Procedure: COMBINED ESOPHAGOSCOPY, GASTROSCOPY, DUODENOSCOPY (EGD), BIOPSY SINGLE OR MULTIPLE;  Surgeon: Clemente Lopez MD;  [facility]     FASCIOTOMY LOWER EXTREMITY Left 6/10/2016    Procedure: FASCIOTOMY LOWER EXTREMITY;   Surgeon: Mello Rodriguez MD;  [facility]     HC CAPSULE ENDOSCOPY N/A 8/25/2014    Procedure: CAPSULE/PILL CAM ENDOSCOPY;  Surgeon: Remy Haskins MD;  [facility]     HC CAPSULE ENDOSCOPY N/A 10/2/2014    Procedure: CAPSULE/PILL CAM ENDOSCOPY;  Surgeon: Remy Haskins MD;  [facility]     ORTHOPEDIC SURGERY      broken wrist repair     SEX TRANSFORMATION SURGERY, MALE TO FEMALE      1974     SINUS SURGERY      cyst removed     TONSILLECTOMY       VASCULAR SURGERY      Left carotid stent       Social History   Substance Use Topics     Smoking status: Former Smoker     Packs/day: 2.50     Years: 30.00     Types: Cigarettes, Cigars     Quit date: 11/1/2000     Smokeless tobacco: Never Used     Alcohol use No     Family History   Problem Relation Age of Onset     Dementia Mother      Glaucoma Mother      DIABETES Mother      Coronary Artery Disease Mother      MI     Glaucoma Father      DIABETES Father      Heart Failure Father      CANCER Maternal Aunt      leukemia     Schizophrenia Brother      Depression Brother      Suicide Sister      Depression Sister      DIABETES Sister      CANCER Maternal Aunt      ovarian     Glaucoma Maternal Grandmother      DIABETES Maternal Grandmother      Glaucoma Maternal Grandfather      DIABETES Maternal Grandfather      Glaucoma Paternal Grandmother      DIABETES Paternal Grandmother      Glaucoma Paternal Grandfather      DIABETES Paternal Grandfather      Breast Cancer Sister      CEREBROVASCULAR DISEASE Brother          Current Outpatient Prescriptions   Medication Sig Dispense Refill     lidocaine (LIDODERM) 5 % Patch Apply from 1 to 3 patches to painful area at once for up to 12 h within a 24 h period.  Remove after 12 hours. 30 patch 1     cyclobenzaprine (FLEXERIL) 10 MG tablet Take 0.5-1 tablets (5-10 mg) by mouth 3 times daily as needed for muscle spasms 30 tablet 1     cetirizine (ZYRTEC) 10 MG tablet Take 1 tablet (10 mg) by mouth daily  as needed for allergies 90 tablet 3     escitalopram (LEXAPRO) 10 MG tablet Take 1 tablet (10 mg) by mouth daily 90 tablet 1     albuterol (VENTOLIN HFA) 108 (90 BASE) MCG/ACT Inhaler Inhale 1-2 puffs into the lungs every 6 hours as needed 1 Inhaler 11     diclofenac (VOLTAREN) 1 % GEL topical gel Apply 2 g topically 4 times daily to hands 100 g 11     EPINEPHrine (EPIPEN JR) 0.15 MG/0.3ML injection Inject 0.3 mLs (0.15 mg) into the muscle as needed for anaphylaxis 0.6 mL 1     lisinopril (PRINIVIL/ZESTRIL) 10 MG tablet Take 1 tablet (10 mg) by mouth daily 90 tablet 3     pantoprazole (PROTONIX) 40 MG EC tablet Take 40 mg by mouth daily       pregabalin (LYRICA) 100 MG capsule Take 100 mg by mouth 2 times daily       risperiDONE (RISPERDAL) 0.5 MG tablet Take 0.5 mg by mouth At Bedtime       ferrous sulfate (IRON) 325 (65 FE) MG tablet Take 1 tablet (325 mg) by mouth 2 times daily With meals 60 tablet 2     blood glucose monitoring (ONE TOUCH ULTRA) test strip Use to test blood sugars 3 times daily or as directed. 3 Box 3     order for DME Equipment being ordered: wheelchair seat cushion 1 Device 0     order for DME Equipment being ordered: CPAP supplies mask, hose, filters, cushion    fax to [facility] at [phone] 10 Device prn     sucralfate (CARAFATE) 1 GM tablet Take 1 tablet (1 g) by mouth 4 times daily May dissolve in 10 mL water is necessary. (Start upon completion of carafate suspension) 40 tablet 5     order for DME Equipment being ordered: CPAP supplies mask, hose, filters, cushion fax to [facility] at [phone]  Disp: 10 Device Refills: prn   Class: Local Print Start: 2/10/2017 1 Device 0     order for DME Equipment being ordered: Nebulizer and tubing supplies 1 Units 0     albuterol (2.5 MG/3ML) 0.083% neb solution INHALE 1 VIAL VIA NEBULIZER EVERY 6 HOURS AS NEEDED 360 mL 11     order for DME ACcu check BID 1 Device 0     ondansetron (ZOFRAN-ODT) 4 MG ODT tab Take 1 tablet (4 mg) by mouth  every 6 hours (Patient taking differently: Take 4 mg by mouth every 6 hours as needed ) 120 tablet 0     metoprolol (TOPROL-XL) 25 MG 24 hr tablet Take 1 tablet (25 mg) by mouth daily 30 tablet 0     CYANOCOBALAMIN PO Take 2,000 mcg by mouth daily       Nutritional Supplements (RENÉE) PACK Take 1 packet by mouth 2 times daily       polyethylene glycol (MIRALAX/GLYCOLAX) powder Take 17 g by mouth daily       HYDROmorphone (DILAUDID) 2 MG tablet Take 1 tablet (2 mg) by mouth every 3 hours as needed for moderate to severe pain 30 tablet 0     fluticasone (FLONASE) 50 MCG/ACT nasal spray Spray 1 spray into both nostrils daily       ADVAIR DISKUS 250-50 MCG/DOSE diskus inhaler Inhale 1 puff into the lungs 2 times daily        calcium citrate-vitamin D (CITRACAL) 315-250 MG-UNIT TABS Take 2 tablets by mouth daily 120 tablet 5     hydrochlorothiazide (MICROZIDE) 12.5 MG capsule Take 1 capsule (12.5 mg) by mouth daily 90 capsule 3     estradiol (ESTRACE) 1 MG tablet Take 1 tablet (1 mg) by mouth daily 90 tablet 3     levETIRAcetam (KEPPRA) 500 MG tablet Take 1 tablet (500 mg) by mouth 2 times daily 180 tablet 1     traZODone (DESYREL) 100 MG tablet Take 1 tablet (100 mg) by mouth At Bedtime 90 tablet 3     aspirin EC 81 MG EC tablet Take 1 tablet (81 mg) by mouth daily 90 tablet 3     clopidogrel (PLAVIX) 75 MG tablet Take 1 tablet (75 mg) by mouth daily (Patient taking differently: Take 75 mg by mouth At Bedtime ) 90 tablet 3     blood glucose monitoring (ONE TOUCH ULTRA 2) meter device kit Use to test blood sugars 3 times daily or as directed. 1 kit 0     blood glucose monitoring (ONE TOUCH ULTRASOFT) lancets Use to test blood sugar 3 times daily or as directed. 3 Box 3     phenytoin 200 MG CAPS Take 200 mg by mouth 2 times daily (Patient taking differently: Take 200 mg by mouth 2 times daily (takes at 8 AM and 8 PM)) 60 capsule 0     dorzolamide (TRUSOPT) 2 % ophthalmic solution Place 1 drop into both eyes 2 times daily         SPIRIVA HANDIHALER 18 MCG inhalation capsule INHALE THE CONTENTS OF 1 CAPSULE VIA INHALATION DEVICE DAILY 30 capsule 2     zinc 50 MG TABS Take 1 tablet by mouth daily       nitroglycerin (NITROSTAT) 0.4 MG SL tablet Place 1 tablet (0.4 mg) under the tongue every 5 minutes as needed for chest pain if you are still having symptoms after 3 doses (15 minutes) call 911. 25 tablet 1     MEDICATION GIVEN BY INTRATHECAL PUMP - INSTRUCTION continuous 4/11/2016 per [facility] in [place] [phone]:  Conc: Bupivacaine 20 mg/mL and Fentanyl 2000 mcg/mL.  Continuous: Bupivacaine 5.052 mg/day and Fentanyl 505.2 mcg/day.  Boluses: Up to 7 boluses per 24-hr period of Bupivicaine 0.5992 mg and Fentanyl 59.9 mcg  Max daily dose: Bupivacaine 9.1973 mg/day and Fentanyl 919.5883 mcg/day    Pump Last Fill Date:  6/30/2016  Pump Refill Date: 9/20/2016       latanoprost (XALATAN) 0.005 % ophthalmic solution Place 1 drop into both eyes At Bedtime 2.5 mL 11     atorvastatin (LIPITOR) 40 MG tablet Take 1 tablet (40 mg) by mouth daily (Patient taking differently: Take 40 mg by mouth At Bedtime ) 90 tablet 3     order for DME Equipment being ordered: Glucerna daily shakes with each meal 1 Box 11     prochlorperazine (COMPAZINE) 10 MG tablet Take 1 tablet (10 mg) by mouth every 8 hours as needed 20 tablet 0     thin (NO BRAND SPECIFIED) lancets Use to test blood sugar 3 times daily or as directed. 1 Box 10     ORDER FOR DME Equipment being ordered: Nebulizer and supplies 1 Units 0     ORDER FOR DME Equipment being ordered: Power Wheelchair 1 Device 0     ORDER FOR DME Equipment being ordered: Depends briefs 1 Month 11     EASY TOUCH LANCETS 30G/TWIST MISC        Cholecalciferol (VITAMIN D) 2000 UNITS tablet Take 2,000 Units by mouth daily. 100 tablet 3     Multiple Vitamin (MULTIVITAMIN OR) Take 1 tablet by mouth daily        Allergies   Allergen Reactions     Bee Venom      Penicillins Anaphylaxis     Other  reaction(s): Skin Rash and/or Hives     Dilantin [Phenytoin] Other (See Comments)     Generic dilantin only per pt     Iodide Hives     09/11/15: Pt states she can use iodine and doesn't have any problems      Iodine-131      Novocaine [Procaine] Hives     Other reaction(s): Skin Rash and/or Hives     BP Readings from Last 3 Encounters:   05/12/17 123/79   05/10/17 124/72   05/02/17 131/69    Wt Readings from Last 3 Encounters:   05/10/17 133 lb (60.3 kg)   05/02/17 133 lb (60.3 kg)   04/26/17 133 lb (60.3 kg)                    Reviewed and updated as needed this visit by clinical staff  Tobacco  Allergies  Med Hx  Surg Hx  Fam Hx  Soc Hx      Reviewed and updated as needed this visit by Provider         ROS:  C: NEGATIVE for fever, chills, change in weight  E/M: NEGATIVE for ear, mouth and throat problems  R: NEGATIVE for significant cough or SOB  CV: NEGATIVE for chest pain, palpitations or peripheral edema  GI: NEGATIVE for nausea, abdominal pain, heartburn, or change in bowel habits  : NEGATIVE for frequency, dysuria, or hematuria  H: NEGATIVE for bleeding problems  P: NEGATIVE for changes in mood or affect    OBJECTIVE:                                                    /78 (BP Location: Left arm)  Pulse 66  SpO2 98%  There is no height or weight on file to calculate BMI.  GENERAL: alert and no distress, in wheelchair, bilateral   BKA's.  EYES: Eyes grossly normal to inspection, extraocular movements - intact, and PERRL  NECK: mild tenderness, no adenopathy, no asymmetry, no masses, no stiffness; thyroid- normal to palpation  RESP: lungs clear to auscultation - no rales, no rhonchi, no wheezes  CV: regular rates and rhythm, normal S1 S2, no S3 or S4 and no click or rub   MS: extremities- no gross deformities noted  PSYCH: Alert and oriented times 3; speech- coherent , normal rate and volume; able to articulate logical thoughts, able to abstract reason, no tangential thoughts, no hallucinations  or delusions, affect- normal       ASSESSMENT/PLAN:                                                    (Z12.31) Visit for screening mammogram  (primary encounter diagnosis)  Comment: ordered as screening  Plan: *MA Screening Digital Bilateral    (M54.2) Cervicalgia  Comment: awaiting PA for Flexeril  Plan: CT Cervical Spine w/o Contrast        In setting of fall, I have suggested better imaging of neck for reassurance.      See Patient Instructions    Allan Casey MD  [facility]

## 2017-05-15 NOTE — MR AVS SNAPSHOT
After Visit Summary   5/15/2017    Sonya Foote    MRN: [id number]           Patient Information     Date Of Birth          1950        Visit Information        Provider Department      5/15/2017 2:40 PM Allan Casey MD [facility]        Today's Diagnoses     Visit for screening mammogram    -  1    Cervicalgia           Follow-ups after your visit        Follow-up notes from your care team     Return if symptoms worsen or fail to improve.      Your next 10 appointments already scheduled     May 15, 2017  3:15 PM CDT   LAB with [facility] ([facility])    [address] [id number] [phone]           Patient must bring picture ID.  Patient should be prepared to give a urine specimen  Please do not eat 10-12 hours before your appointment if you are coming in fasting for labs on lipids, cholesterol, or glucose (sugar).  Pregnant women should follow their Care Team instructions. Water with medications is okay. Do not drink coffee or other fluids.   If you have concerns about taking  your medications, please ask at office or if scheduling via [employer], send a message by clicking on Secure Messaging, Message Your Care Team.            May 18, 2017  1:00 PM CDT   (Arrive by 12:45 PM)   Cystoscopy with Elizabeth Oliver MD   [facility] Prostate [facility] Urologic Cancers ([facility])    [address] [place] [id number] [phone]            Jun 13, 2017  2:00 PM CDT   Return Sleep Patient with Guanaco Kowalski MD   [facility] ([facility])    [address] [place] [address]   [phone]            Jul 10, 2017 11:00 AM CDT   (Arrive by 10:45 AM)   Return Visit with Alina Jennings MD   [facility] ([facility])    [address]  3rd Floor  [facility] [address]   [phone]            Sep 08, 2017 10:00 AM CDT   (Arrive by 9:45 AM)   New Patient Visit with VICTOR M Floyd CNP   [facility]enter[facility] IBD ([facility])    [address]  4th Floor  [facility] [address]   [phone]            Nov 10, 2017  9:20 AM CST   (Arrive by 9:05 AM)   RETURN DIABETES with Michelle Irizarry MD   [facility]ocrinology ([facility])    [address]  [address]rd [facility] [address]   [phone]              Future tests that were ordered for you today     Open Future Orders        Priority Expected Expires Ordered    *MA Screening Digital Bilateral Routine 5/15/2017 6/30/2017 5/15/2017    CT Cervical Spine w/o Contrast Routine  5/15/2018 5/15/2017            Who to contact     If you have questions or need follow up information about today's clinic visit or your schedule please contact [facility] directly at [phone].  Normal or non-critical lab and imaging results will be communicated to you by MyChart, letter or phone within 4 business days after the clinic has received the results. If you do not hear from us within 7 days, please contact the clinic through [employer] or phone. If you have a critical or abnormal lab result, we will notify you by phone as soon as possible.  Submit refill requests through [employer] or call your pharmacy and they will forward the refill request to us. Please allow 3 business days for your refill to be completed.          Additional Information About Your Visit        [employer] Information     [employer] lets you send messages to your doctor, view your test results, renew your prescriptions, schedule appointments and more. To sign up, go to [email or web address] . Click on \"Log in\" on the left side of the screen, which will take you " "to the Welcome page. Then click on \"Sign up Now\" on the right side of the page.     You will be asked to enter the access code listed below, as well as some personal information. Please follow the directions to create your username and password.     Your access code is: [email or web address]  Expires: 2017 11:52 AM     Your access code will  in 90 days. If you need help or a new code, please call your [place] clinic or [phone].        Care EveryWhere ID     This is your Care EveryWhere ID. This could be used by other organizations to access your [place] medical records  [id number]        Your Vitals Were     Pulse Pulse Oximetry                66 98%           Blood Pressure from Last 3 Encounters:   05/15/17 130/78   17 123/79   05/10/17 124/72    Weight from Last 3 Encounters:   05/10/17 133 lb (60.3 kg)   17 133 lb (60.3 kg)   17 133 lb (60.3 kg)                 Today's Medication Changes          These changes are accurate as of: 5/15/17  3:02 PM.  If you have any questions, ask your nurse or doctor.               These medicines have changed or have updated prescriptions.        Dose/Directions    atorvastatin 40 MG tablet   Commonly known as:  LIPITOR   This may have changed:  when to take this   Used for:  Hyperlipidemia LDL goal <100        Dose:  40 mg   Take 1 tablet (40 mg) by mouth daily   Quantity:  90 tablet   Refills:  3       clopidogrel 75 MG tablet   Commonly known as:  PLAVIX   This may have changed:  when to take this   Used for:  PAD (peripheral artery disease) (H), UGI bleed, Osteoporosis, Nausea        Dose:  75 mg   Take 1 tablet (75 mg) by mouth daily   Quantity:  90 tablet   Refills:  3       ondansetron 4 MG ODT tab   Commonly known as:  ZOFRAN-ODT   This may have changed:    - when to take this  - reasons to take this   Used for:  Intractable vomiting with nausea, unspecified vomiting type        Dose:  4 mg   Take 1 tablet (4 mg) by mouth every 6 hours "   Quantity:  120 tablet   Refills:  0       Phenytoin Sodium Extended 200 MG Caps   This may have changed:  additional instructions   Used for:  Seizure disorder (H)        Dose:  200 mg   Take 200 mg by mouth 2 times daily   Quantity:  60 capsule   Refills:  0                Primary Care Provider Office Phone # Fax #    Allan Casey [phone]       [facility] [address] [facility] [address]        Thank you!     Thank you for choosing [facility]  for your care. Our goal is always to provide you with excellent care. Hearing back from our patients is one way we can continue to improve our services. Please take a few minutes to complete the written survey that you may receive in the mail after your visit with us. Thank you!             Your Updated Medication List - Protect others around you: Learn how to safely use, store and throw away your medicines at www.disposemymeds.org.          This list is accurate as of: 5/15/17  3:02 PM.  Always use your most recent med list.                   Brand Name Dispense Instructions for use    ADVAIR DISKUS 250-50 MCG/DOSE diskus inhaler   Generic drug:  fluticasone-salmeterol      Inhale 1 puff into the lungs 2 times daily       * albuterol (2.5 MG/3ML) 0.083% neb solution     360 mL    INHALE 1 VIAL VIA NEBULIZER EVERY 6 HOURS AS NEEDED       * albuterol 108 (90 BASE) MCG/ACT Inhaler    VENTOLIN HFA    1 Inhaler    Inhale 1-2 puffs into the lungs every 6 hours as needed       aspirin 81 MG EC tablet     90 tablet    Take 1 tablet (81 mg) by mouth daily       atorvastatin 40 MG tablet    LIPITOR    90 tablet    Take 1 tablet (40 mg) by mouth daily       blood glucose monitoring meter device kit     1 kit    Use to test blood sugars 3 times daily or as directed.       blood glucose monitoring test strip    ONE TOUCH ULTRA    3 Box    Use to test blood sugars 3 times daily or as directed.       calcium  citrate-vitamin D 315-250 MG-UNIT Tabs per tablet    CITRACAL    120 tablet    Take 2 tablets by mouth daily       cetirizine 10 MG tablet    zyrTEC    90 tablet    Take 1 tablet (10 mg) by mouth daily as needed for allergies       clopidogrel 75 MG tablet    PLAVIX    90 tablet    Take 1 tablet (75 mg) by mouth daily       CYANOCOBALAMIN PO      Take 2,000 mcg by mouth daily       cyclobenzaprine 10 MG tablet    FLEXERIL    30 tablet    Take 0.5-1 tablets (5-10 mg) by mouth 3 times daily as needed for muscle spasms       diclofenac 1 % Gel topical gel    VOLTAREN    100 g    Apply 2 g topically 4 times daily to hands       dorzolamide 2 % ophthalmic solution    TRUSOPT     Place 1 drop into both eyes 2 times daily       * EASY TOUCH LANCETS 30G/TWIST Misc          * thin lancets    NO BRAND SPECIFIED    1 Box    Use to test blood sugar 3 times daily or as directed.       * blood glucose monitoring lancets     3 Box    Use to test blood sugar 3 times daily or as directed.       EPINEPHrine 0.15 MG/0.3ML injection    EPIPEN JR    0.6 mL    Inject 0.3 mLs (0.15 mg) into the muscle as needed for anaphylaxis       escitalopram 10 MG tablet    LEXAPRO    90 tablet    Take 1 tablet (10 mg) by mouth daily       estradiol 1 MG tablet    ESTRACE    90 tablet    Take 1 tablet (1 mg) by mouth daily       ferrous sulfate 325 (65 FE) MG tablet    IRON    60 tablet    Take 1 tablet (325 mg) by mouth 2 times daily With meals       fluticasone 50 MCG/ACT spray    FLONASE     Spray 1 spray into both nostrils daily       hydrochlorothiazide 12.5 MG capsule    MICROZIDE    90 capsule    Take 1 capsule (12.5 mg) by mouth daily       HYDROmorphone 2 MG tablet    DILAUDID    30 tablet    Take 1 tablet (2 mg) by mouth every 3 hours as needed for moderate to severe pain       RENÉE Pack      Take 1 packet by mouth 2 times daily       latanoprost 0.005 % ophthalmic solution    XALATAN    2.5 mL    Place 1 drop into both eyes At Bedtime        levETIRAcetam 500 MG tablet    KEPPRA    180 tablet    Take 1 tablet (500 mg) by mouth 2 times daily       lidocaine 5 % Patch    LIDODERM    30 patch    Apply from 1 to 3 patches to painful area at once for up to 12 h within a 24 h period.  Remove after 12 hours.       lisinopril 10 MG tablet    PRINIVIL/ZESTRIL    90 tablet    Take 1 tablet (10 mg) by mouth daily       LYRICA 100 MG capsule   Generic drug:  pregabalin      Take 100 mg by mouth 2 times daily       MEDICATION GIVEN BY INTRATHECAL PUMP - INSTRUCTION      continuous 4/11/2016 per [facility] [place] [phone]: Conc: Bupivacaine 20 mg/mL and Fentanyl 2000 mcg/mL. Continuous: Bupivacaine 5.052 mg/day and Fentanyl 505.2 mcg/day. Boluses: Up to 7 boluses per 24-hr period of Bupivicaine 0.5992 mg and Fentanyl 59.9 mcg Max daily dose: Bupivacaine 9.1973 mg/day and Fentanyl 919.5883 mcg/day  Pump Last Fill Date:  6/30/2016 Pump Refill Date: 9/20/2016       metoprolol 25 MG 24 hr tablet    TOPROL-XL    30 tablet    Take 1 tablet (25 mg) by mouth daily       MULTIVITAMIN PO      Take 1 tablet by mouth daily       nitroglycerin 0.4 MG sublingual tablet    NITROSTAT    25 tablet    Place 1 tablet (0.4 mg) under the tongue every 5 minutes as needed for chest pain if you are still having symptoms after 3 doses (15 minutes) call 911.       ondansetron 4 MG ODT tab    ZOFRAN-ODT    120 tablet    Take 1 tablet (4 mg) by mouth every 6 hours       * order for DME     1 Month    Equipment being ordered: Depends briefs       * order for DME     1 Device    Equipment being ordered: Power Wheelchair       * order for DME     1 Units    Equipment being ordered: Nebulizer and supplies       * order for DME     1 Box    Equipment being ordered: Glucerna daily shakes with each meal       * order for DME     1 Device    ACcu check BID       * order for DME     1 Units    Equipment being ordered: Nebulizer and tubing supplies       * order  for DME     1 Device    Equipment being ordered: CPAP supplies mask, hose, filters, cushion fax to [facility] at [phone] Disp: 10 DeviceRefills: prn Class: Local PrintStart: 2/10/2017       * order for DME     10 Device    Equipment being ordered: CPAP supplies mask, hose, filters, cushion  fax to [facility] at [phone]       * order for DME     1 Device    Equipment being ordered: wheelchair seat cushion       pantoprazole 40 MG EC tablet    PROTONIX     Take 40 mg by mouth daily       Phenytoin Sodium Extended 200 MG Caps     60 capsule    Take 200 mg by mouth 2 times daily       polyethylene glycol powder    MIRALAX/GLYCOLAX     Take 17 g by mouth daily       prochlorperazine 10 MG tablet    COMPAZINE    20 tablet    Take 1 tablet (10 mg) by mouth every 8 hours as needed       risperiDONE 0.5 MG tablet    risperDAL     Take 0.5 mg by mouth At Bedtime       SPIRIVA HANDIHALER 18 MCG capsule   Generic drug:  tiotropium     30 capsule    INHALE THE CONTENTS OF 1 CAPSULE VIA INHALATION DEVICE DAILY       sucralfate 1 GM tablet    CARAFATE    40 tablet    Take 1 tablet (1 g) by mouth 4 times daily May dissolve in 10 mL water is necessary. (Start upon completion of carafate suspension)       traZODone 100 MG tablet    DESYREL    90 tablet    Take 1 tablet (100 mg) by mouth At Bedtime       vitamin D 2000 UNITS tablet     100 tablet    Take 2,000 Units by mouth daily.       zinc 50 MG Tabs      Take 1 tablet by mouth daily       * Notice:  This list has 14 medication(s) that are the same as other medications prescribed for you. Read the directions carefully, and ask your doctor or other care provider to review them with you.

## 2017-05-16 ENCOUNTER — CARE COORDINATION (OUTPATIENT)
Dept: GERIATRIC MEDICINE | Facility: CLINIC | Age: 67
End: 2017-05-16

## 2017-05-16 DIAGNOSIS — Z76.89 HEALTH CARE HOME: Chronic | ICD-10-CM

## 2017-05-16 LAB — PHENYTOIN FREE SERPL-MCNC: 0.68 UG/ML

## 2017-05-16 NOTE — PROGRESS NOTES
5/16/17: Received vm from ULISES Shine with [facility] regarding commode - she states that they found the commode to use over the toilet however it is not working - Fátima is inquiring about a toilet safety frame - she has a specific one in mind.   CM placed call back to Fátima - left vm.     Also, received call from member making sure CM received Fátima's message.  CM verified and explained that ELVIRA would discuss with Fátima.     Jamie Sharma RN, N  [facility]

## 2017-05-17 ENCOUNTER — PRE VISIT (OUTPATIENT)
Dept: UROLOGY | Facility: CLINIC | Age: 67
End: 2017-05-17

## 2017-05-18 ENCOUNTER — OFFICE VISIT (OUTPATIENT)
Dept: UROLOGY | Facility: CLINIC | Age: 67
End: 2017-05-18

## 2017-05-18 ENCOUNTER — TELEPHONE (OUTPATIENT)
Dept: INTERNAL MEDICINE | Facility: CLINIC | Age: 67
End: 2017-05-18

## 2017-05-18 VITALS — DIASTOLIC BLOOD PRESSURE: 80 MMHG | HEART RATE: 73 BPM | SYSTOLIC BLOOD PRESSURE: 174 MMHG

## 2017-05-18 DIAGNOSIS — R39.81 FUNCTIONAL URINARY INCONTINENCE: ICD-10-CM

## 2017-05-18 DIAGNOSIS — N39.0 RECURRENT UTI: Primary | ICD-10-CM

## 2017-05-18 DIAGNOSIS — I25.2 OLD MYOCARDIAL INFARCTION: ICD-10-CM

## 2017-05-18 RX ORDER — CLINDAMYCIN PHOSPHATE 900 MG/50ML
900 INJECTION, SOLUTION INTRAVENOUS
Status: CANCELLED | OUTPATIENT
Start: 2017-05-18

## 2017-05-18 RX ORDER — METOPROLOL SUCCINATE 25 MG/1
25 TABLET, EXTENDED RELEASE ORAL DAILY
Qty: 30 TABLET | Refills: 0 | Status: SHIPPED | OUTPATIENT
Start: 2017-05-18 | End: 2017-07-03

## 2017-05-18 RX ORDER — CLINDAMYCIN PHOSPHATE 900 MG/50ML
900 INJECTION, SOLUTION INTRAVENOUS SEE ADMIN INSTRUCTIONS
Status: CANCELLED | OUTPATIENT
Start: 2017-05-18

## 2017-05-18 ASSESSMENT — PAIN SCALES - GENERAL
PAINLEVEL: NO PAIN (0)
PAINLEVEL: NO PAIN (0)

## 2017-05-18 NOTE — LETTER
5/18/2017     RE: Sonya Foote  [address]   [place] [address]     Dear Colleague,    Thank you for referring your patient, Sonya Foote, to the [facility] UROLOGY AND INST FOR PROSTATE AND UROLOGIC CANCERS at [facility]. Please see a copy of my visit note below.    May 18, 2017    Return visit    Patient returns today for follow up.  She is here for a cystoscopy given her history of UTI.  We also assessed her urethra given the question of prostate tissue.   Catheterized urine culture from last week was negative.  She denies any changes in her health since last visit.    /80  Pulse 73  She is comfortable, in no distress, non-labored breathing.  Abdomen is soft, non-tender, non-distended. She has what appears to be a well healed pfannenstiel incision which she reports is from a surgery that she had as a child. Normal external female genitalia.  Negative CST.      Cystoscopy Note: After informed consent was obtained patient was prepped and draped in the standard fashion.  The flexible cystoscope was inserted into a normal appearing urethral meatus.  The urothelium was carefully examined and there were no tumors, masses, stones, foreign bodies, or other urothelial abnormalities noted.  Bilateral ureteral orifices were noted in the normal orthotopic position and both effluxed clear urine.  The cystoscope was retroflexed and the bladder neck was unremarkable.  The urethra was carefully examined upon removing the cystoscope and was unremarkable, there was not obvious prostatic tissue noted.  Patient tolerated the procedure without complications noted.      CT reviewed and there appears to be a safe place to place a SPT.    A/P: 67 year old F with extensive vascular disease s/p bilateral AKA and Kari who desires a suprapubic catheter     We discussed the risks to include bleeding, infection, injury to adjacent bowel/blood vessels, urinary tract infections, the  fact that she may still have occasional transurethral urine lost, the risk of malignancy with an indwelling catheter.      Patient expresses understanding of this but given that she has such poor quality of life secondary to functional limitations she wishes to proceed.  She understands that she must stop her blood thinners    Given that she is not a candidate for CSC, we will see if my colleague Dr Connors is able to place the catheter in the near future at the [facility] so that patient does not have to have a delay her her surgery.  She will follow up with me in the postoperative period    Elizabeth Oliver MD MPH   of Urology    CC  Patient Care Team:  Allan Casey MD as PCP - General (Internal Medicine)  Lexii Cardenas, RN as Registered Nurse (Diabetic Education)  Shavon Elam RD as Registered Dietician (Nutrition)  Jamie Sharma, RN as   Ernestine Weldon as Other (see comments)  Michelle Irizarry MD as MD (Internal Medicine)  Savannah Durant MD as MD (Vascular Surgery)  Allan Casey MD as MD (Internal Medicine)  Azael Arriaga MD as Resident (Ophthalmology)  Dayna Ellis NP as Nurse Practitioner (Nurse Practitioner)  Self, Referred, MD as Referring Physician  Janusz Rm MD as MD (Ophthalmology)  Alina Jennings MD as MD (Pulmonary)  Mello Arroyo MD as MD (Family Medicine - Sports Medicine)  Mary Elliott OT as Occupational Therapist (Occupational Therapy)  Tonie Syed, RN as Nurse Coordinator (Vascular Surgery)  Salome Neri  ESTABLISHED PATIENT

## 2017-05-18 NOTE — PROGRESS NOTES
May 18, 2017    Return visit    Patient returns today for follow up.  She is here for a cystoscopy given her history of UTI.  We also assessed her urethra given the question of prostate tissue.   Catheterized urine culture from last week was negative.  She denies any changes in her health since last visit.    /80  Pulse 73  She is comfortable, in no distress, non-labored breathing.  Abdomen is soft, non-tender, non-distended. She has what appears to be a well healed pfannenstiel incision which she reports is from a surgery that she had as a child. Normal external female genitalia.  Negative CST.      Cystoscopy Note: After informed consent was obtained patient was prepped and draped in the standard fashion.  The flexible cystoscope was inserted into a normal appearing urethral meatus.  The urothelium was carefully examined and there were no tumors, masses, stones, foreign bodies, or other urothelial abnormalities noted.  Bilateral ureteral orifices were noted in the normal orthotopic position and both effluxed clear urine.  The cystoscope was retroflexed and the bladder neck was unremarkable.  The urethra was carefully examined upon removing the cystoscope and was unremarkable, there was not obvious prostatic tissue noted.  Patient tolerated the procedure without complications noted.      CT reviewed and there appears to be a safe place to place a SPT.    A/P: 67 year old F with extensive vascular disease s/p bilateral AKA and Kari who desires a suprapubic catheter     We discussed the risks to include bleeding, infection, injury to adjacent bowel/blood vessels, urinary tract infections, the fact that she may still have occasional transurethral urine lost, the risk of malignancy with an indwelling catheter.      Patient expresses understanding of this but given that she has such poor quality of life secondary to functional limitations she wishes to proceed.  She understands that she must stop her blood  thinners    Given that she is not a candidate for CSC, we will see if my colleague Dr Connors is able to place the catheter in the near future at the [facility] so that patient does not have to have a delay her her surgery.  She will follow up with me in the postoperative period    Elizabeth Oliver MD MPH   of Urology    CC  Patient Care Team:  Allan Casey MD as PCP - General (Internal Medicine)  Lexii Cardenas, RN as Registered Nurse (Diabetic Education)  Shavon Elam RD as Registered Dietician (Nutrition)  Jamie Sharma RN as   Ernestine Weldon as Other (see comments)  Michelle Irizarry MD as MD (Internal Medicine)  Savannah Durant MD as MD (Vascular Surgery)  Allan Casey MD as MD (Internal Medicine)  Azael Arriaga MD as Resident (Ophthalmology)  Dayna Ellis NP as Nurse Practitioner (Nurse Practitioner)  Self, Referred, MD as Referring Physician  Janusz Rm MD as MD (Ophthalmology)  Alina Jennings MD as MD (Pulmonary)  Mello Arroyo MD as MD (Family Medicine - Sports Medicine)  Mary Elliott OT as Occupational Therapist (Occupational Therapy)  Tonie Syed, RN as Nurse Coordinator (Vascular Surgery)  Salome Neri  ESTABLISHED PATIENT

## 2017-05-18 NOTE — PATIENT INSTRUCTIONS
We need you to see anesthesia to determine where we can do the surgery    You will need a catheterized urine one week prior    You will need to stop all blood thinners about one week prior

## 2017-05-18 NOTE — NURSING NOTE
Chief Complaint   Patient presents with     Cystoscopy     gross hematuria       Blood pressure 174/80, pulse 73, not currently breastfeeding. There is no height or weight on file to calculate BMI.    Patient Active Problem List   Diagnosis     Hyperlipidemia LDL goal <100     Seizure disorder (H)     ACP (advance care planning)     Osteoporosis     Schizoaffective disorder (H)     AS (sickle cell trait) (H)     Vertigo     Person who has had sex change operation     Claudication in peripheral vascular disease (H)     Intestinal malabsorption     GIB (gastrointestinal bleeding)     Cervicalgia     Health Care Home     Asthma     Adjustment disorder with depressed mood     Chronic pain syndrome     Open-angle glaucoma     History of colonic polyps     Old myocardial infarction     Iron deficiency anemia     Late effects of cerebrovascular disease     Degeneration of intervertebral disc of lumbosacral region     Thoracic or lumbosacral neuritis or radiculitis     Cerebral infarction due to occlusion or stenosis of carotid artery     Disorder of bone and cartilage     Hereditary and idiopathic peripheral neuropathy     Androgen insensitivity syndrome     PAD (peripheral artery disease) (H)     Chronic systolic congestive heart failure (H)     Carotid stenosis, left     Primary osteoarthritis of both hands     Pain in both upper extremities     Atherosclerotic peripheral vascular disease with rest pain (H)     Essential hypertension with goal blood pressure less than 130/80     Cellulitis of right ankle     Angina pectoris, crescendo (H)     Type 2 diabetes mellitus with diabetic peripheral angiopathy without gangrene, without long-term current use of insulin (H)     Anemia, unspecified type     Critical lower limb ischemia     Testicular feminization     Anxiety disorder due to general medical condition     Anxiety disorder     Central retinal artery occlusion     Lumbosacral radiculitis     Peripheral nerve disease  (H)     Osteopenia     Prediabetes     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 (H)     Primary open angle glaucoma of both eyes, severe stage     Pseudophakia of right eye     Cataract, left eye     Diabetes mellitus type 2 without retinopathy (H)     Pyelonephritis     Chronic obstructive pulmonary disease, unspecified COPD type (H)     JOSE (obstructive sleep apnea)     Complex sleep apnea syndrome     Coronary artery disease of native artery of native heart with stable angina pectoris (H)     Hyperlipidemia LDL goal <70     Ischemic cardiomyopathy     Bee sting reaction, undetermined intent, subsequent encounter       Allergies   Allergen Reactions     Bee Venom      Penicillins Anaphylaxis     Other reaction(s): Skin Rash and/or Hives     Dilantin [Phenytoin] Other (See Comments)     Generic dilantin only per pt     Iodide Hives     09/11/15: Pt states she can use iodine and doesn't have any problems      Iodine-131      Novocaine [Procaine] Hives     Other reaction(s): Skin Rash and/or Hives       Current Outpatient Prescriptions   Medication Sig Dispense Refill     lidocaine (LIDODERM) 5 % Patch Apply from 1 to 3 patches to painful area at once for up to 12 h within a 24 h period.  Remove after 12 hours. 30 patch 1     cyclobenzaprine (FLEXERIL) 10 MG tablet Take 0.5-1 tablets (5-10 mg) by mouth 3 times daily as needed for muscle spasms 30 tablet 1     cetirizine (ZYRTEC) 10 MG tablet Take 1 tablet (10 mg) by mouth daily as needed for allergies 90 tablet 3     escitalopram (LEXAPRO) 10 MG tablet Take 1 tablet (10 mg) by mouth daily 90 tablet 1     albuterol (VENTOLIN HFA) 108 (90 BASE) MCG/ACT Inhaler Inhale 1-2 puffs into the lungs every 6 hours as needed 1 Inhaler 11     diclofenac (VOLTAREN) 1 % GEL topical gel Apply 2 g topically 4 times daily to hands 100 g 11     EPINEPHrine (EPIPEN JR) 0.15 MG/0.3ML injection Inject 0.3 mLs (0.15 mg) into the muscle as needed for anaphylaxis 0.6 mL 1      lisinopril (PRINIVIL/ZESTRIL) 10 MG tablet Take 1 tablet (10 mg) by mouth daily 90 tablet 3     pantoprazole (PROTONIX) 40 MG EC tablet Take 40 mg by mouth daily       pregabalin (LYRICA) 100 MG capsule Take 100 mg by mouth 2 times daily       risperiDONE (RISPERDAL) 0.5 MG tablet Take 0.5 mg by mouth At Bedtime       ferrous sulfate (IRON) 325 (65 FE) MG tablet Take 1 tablet (325 mg) by mouth 2 times daily With meals 60 tablet 2     blood glucose monitoring (ONE TOUCH ULTRA) test strip Use to test blood sugars 3 times daily or as directed. 3 Box 3     order for DME Equipment being ordered: wheelchair seat cushion 1 Device 0     order for DME Equipment being ordered: CPAP supplies mask, hose, filters, cushion    fax to [facility] at [phone] 10 Device prn     sucralfate (CARAFATE) 1 GM tablet Take 1 tablet (1 g) by mouth 4 times daily May dissolve in 10 mL water is necessary. (Start upon completion of carafate suspension) 40 tablet 5     order for DME Equipment being ordered: CPAP supplies mask, hose, filters, cushion fax to [facility] at [phone]  Disp: 10 Device Refills: prn   Class: Local Print Start: 2/10/2017 1 Device 0     order for DME Equipment being ordered: Nebulizer and tubing supplies 1 Units 0     albuterol (2.5 MG/3ML) 0.083% neb solution INHALE 1 VIAL VIA NEBULIZER EVERY 6 HOURS AS NEEDED 360 mL 11     order for DME ACcu check BID 1 Device 0     ondansetron (ZOFRAN-ODT) 4 MG ODT tab Take 1 tablet (4 mg) by mouth every 6 hours (Patient taking differently: Take 4 mg by mouth every 6 hours as needed ) 120 tablet 0     metoprolol (TOPROL-XL) 25 MG 24 hr tablet Take 1 tablet (25 mg) by mouth daily 30 tablet 0     CYANOCOBALAMIN PO Take 2,000 mcg by mouth daily       Nutritional Supplements (RENÉE) PACK Take 1 packet by mouth 2 times daily       polyethylene glycol (MIRALAX/GLYCOLAX) powder Take 17 g by mouth daily       HYDROmorphone (DILAUDID) 2 MG tablet Take 1 tablet (2 mg) by mouth  every 3 hours as needed for moderate to severe pain 30 tablet 0     fluticasone (FLONASE) 50 MCG/ACT nasal spray Spray 1 spray into both nostrils daily       ADVAIR DISKUS 250-50 MCG/DOSE diskus inhaler Inhale 1 puff into the lungs 2 times daily        calcium citrate-vitamin D (CITRACAL) 315-250 MG-UNIT TABS Take 2 tablets by mouth daily 120 tablet 5     hydrochlorothiazide (MICROZIDE) 12.5 MG capsule Take 1 capsule (12.5 mg) by mouth daily 90 capsule 3     estradiol (ESTRACE) 1 MG tablet Take 1 tablet (1 mg) by mouth daily 90 tablet 3     levETIRAcetam (KEPPRA) 500 MG tablet Take 1 tablet (500 mg) by mouth 2 times daily 180 tablet 1     traZODone (DESYREL) 100 MG tablet Take 1 tablet (100 mg) by mouth At Bedtime 90 tablet 3     aspirin EC 81 MG EC tablet Take 1 tablet (81 mg) by mouth daily 90 tablet 3     clopidogrel (PLAVIX) 75 MG tablet Take 1 tablet (75 mg) by mouth daily (Patient taking differently: Take 75 mg by mouth At Bedtime ) 90 tablet 3     blood glucose monitoring (ONE TOUCH ULTRA 2) meter device kit Use to test blood sugars 3 times daily or as directed. 1 kit 0     blood glucose monitoring (ONE TOUCH ULTRASOFT) lancets Use to test blood sugar 3 times daily or as directed. 3 Box 3     phenytoin 200 MG CAPS Take 200 mg by mouth 2 times daily (Patient taking differently: Take 200 mg by mouth 2 times daily (takes at 8 AM and 8 PM)) 60 capsule 0     dorzolamide (TRUSOPT) 2 % ophthalmic solution Place 1 drop into both eyes 2 times daily        SPIRIVA HANDIHALER 18 MCG inhalation capsule INHALE THE CONTENTS OF 1 CAPSULE VIA INHALATION DEVICE DAILY 30 capsule 2     zinc 50 MG TABS Take 1 tablet by mouth daily       nitroglycerin (NITROSTAT) 0.4 MG SL tablet Place 1 tablet (0.4 mg) under the tongue every 5 minutes as needed for chest pain if you are still having symptoms after 3 doses (15 minutes) call 911. 25 tablet 1     MEDICATION GIVEN BY INTRATHECAL PUMP - INSTRUCTION continuous 4/11/2016 per [facility] in [place] [phone]:  Conc: Bupivacaine 20 mg/mL and Fentanyl 2000 mcg/mL.  Continuous: Bupivacaine 5.052 mg/day and Fentanyl 505.2 mcg/day.  Boluses: Up to 7 boluses per 24-hr period of Bupivicaine 0.5992 mg and Fentanyl 59.9 mcg  Max daily dose: Bupivacaine 9.1973 mg/day and Fentanyl 919.5883 mcg/day    Pump Last Fill Date:  6/30/2016  Pump Refill Date: 9/20/2016       latanoprost (XALATAN) 0.005 % ophthalmic solution Place 1 drop into both eyes At Bedtime 2.5 mL 11     atorvastatin (LIPITOR) 40 MG tablet Take 1 tablet (40 mg) by mouth daily (Patient taking differently: Take 40 mg by mouth At Bedtime ) 90 tablet 3     order for DME Equipment being ordered: Glucerna daily shakes with each meal 1 Box 11     prochlorperazine (COMPAZINE) 10 MG tablet Take 1 tablet (10 mg) by mouth every 8 hours as needed 20 tablet 0     thin (NO BRAND SPECIFIED) lancets Use to test blood sugar 3 times daily or as directed. 1 Box 10     ORDER FOR DME Equipment being ordered: Nebulizer and supplies 1 Units 0     ORDER FOR DME Equipment being ordered: Power Wheelchair 1 Device 0     ORDER FOR DME Equipment being ordered: Depends briefs 1 Month 11     EASY TOUCH LANCETS 30G/TWIST MISC        Cholecalciferol (VITAMIN D) 2000 UNITS tablet Take 2,000 Units by mouth daily. 100 tablet 3     Multiple Vitamin (MULTIVITAMIN OR) Take 1 tablet by mouth daily          Social History   Substance Use Topics     Smoking status: Former Smoker     Packs/day: 2.50     Years: 30.00     Types: Cigarettes, Cigars     Quit date: 11/1/2000     Smokeless tobacco: Never Used     Alcohol use No       AUBREY Townsend  5/18/2017  1:28 PM   .

## 2017-05-18 NOTE — TELEPHONE ENCOUNTER
metoprolol (TOPROL-XL) 25 MG 24 hr tablet      Last Written Prescription Date: 12/28/2016  Last Fill Quantity: 30, # refills: 0    Last Office Visit with [facility], [facility] or [facility] prescribing provider:  5/15/2017   Future Office Visit:        BP Readings from Last 3 Encounters:   05/18/17 174/80   05/15/17 130/78   05/12/17 123/79

## 2017-05-18 NOTE — PROGRESS NOTES
5/18/17: Cm left vm and sent email to Fátima to f/u on toilet safety frame.     Jamie Sharma RN, N  [facility]

## 2017-05-18 NOTE — NURSING NOTE
Invasive Procedure Safety Checklist:    Procedure:     Action: Complete sections and checkboxes as appropriate.    Pre-procedure:  1. Patient ID Verified with 2 identifiers (Karly and  or MRN) : YES    2. Procedure and site verified with patient/designee (when able) : YES    3. Accurate consent documentation in medical record : YES    4. H&P (or appropriate assessment) documented in medical record : YES  H&P must be up to 30 days prior to procedure an updated within 24 hours of                 Procedure as applicable.     5. Relevant diagnostic and radiology test results appropriately labeled and displayed as applicable : YES    6. Blood products, implants, devices, and/or special equipment available for the procedure as applicable : YES    7. Procedure site(s) marked with provider initials [Exclusions: None] : NO    8. Marking not required. Reason : Yes  Procedure does not require site marking    Time Out:     Time-Out performed immediately prior to starting procedure, including verbal and active participation of all team members addressing: YES    1. Correct patient identity.  2. Confirmed that the correct side and site are marked.  3. An accurate procedure to be done.  4. Agreement on the procedure to be done.  5. Correct patient position.  6. Relevant images and results are properly labeled and appropriately displayed.  7. The need to administer antibiotics or fluids for irrigation purposes during the procedure as applicable.  8. Safety precautions based on patient history or medication use.    During Procedure: Verification of correct person, site, and procedure occurs any time the responsibility for care of the patient is transferred to another member of the care team.

## 2017-05-18 NOTE — TELEPHONE ENCOUNTER
Routing refill request to provider for review/approval because:  Last prescribed 12/28/16 for 30 day refill has not refilled since.

## 2017-05-18 NOTE — MR AVS SNAPSHOT
After Visit Summary   5/18/2017    Sonya Foote    MRN: [id number]           Patient Information     Date Of Birth          1950        Visit Information        Provider Department      5/18/2017 1:00 PM Elizabeth Oliver MD [facility] Urology and [facility]        Today's Diagnoses     Recurrent UTI    -  1    Functional urinary incontinence          Care Instructions    We need you to see anesthesia to determine where we can do the surgery    You will need a catheterized urine one week prior    You will need to stop all blood thinners about one week prior        Follow-ups after your visit        Additional Services     PAC Visit Referral (For [facility] Only)       Does this visit require an Anesthesia consult?  Yes - Evaluate for medical necessity related to one of the following conditions:  Vascular disease    H&P done by:  PAC      Please be aware that coverage of these services is subject to the terms and limitations of your health insurance plan.  Call member services at your health plan with any benefit or coverage questions.      Please bring the following to your appointment:  >>   Any x-rays, CTs or MRIs which have been performed.  Contact the facility where they were done to arrange for  prior to your scheduled appointment.  Any new CT, MRI or other procedures ordered by your specialist must be performed at a [facility] facility or coordinated by your clinic's referral office.    >>   List of current medications  >>   This referral request   >>   Any documents/labs given to you for this referral                  Your next 10 appointments already scheduled     May 24, 2017 11:30 AM CDT   CT CERVICAL SPINE W/O CONTRAST with SHCT1   [facility] ([facility])    [address] [place] [address] [phone]           Please bring any scans or X-rays taken at other hospitals, if similar tests were done. Also bring a list  of your medicines, including vitamins, minerals and over-the-counter drugs. It is safest to leave personal items at home.  Be sure to tell your doctor:   If you have any allergies.   If there s any chance you are pregnant.   If you are breastfeeding.   If you have any special needs.  You do not need to do anything special to prepare.  Please wear loose clothing, such as a sweat suit or jogging clothes. Avoid snaps, zippers and other metal. We may ask you to undress and put on a hospital gown.            May 31, 2017 10:30 AM CDT   MA SCREENING DIGITAL BILATERAL with SHBCMA1   [facility] [place] ([facility])    [address] [place] [address], [address]  [facility] [address] [phone]           Do not use any powder, lotion or deodorant under your arms or on your breast. If you do, we will ask you to remove it before your exam.  Wear comfortable, two-piece clothing.  If you have any allergies, tell your care team.  Bring any previous mammograms from other facilities or have them mailed to the breast center. This mammogram location, [facility] [place], now offers 3D mammography. It doesn't replace a screening mammogram and can be done with a regular screening mammogram. It is optional and not all insurances will pay for it. 3D mammography is a special kind of mammogram that produces a three-dimensional image of the breast by using low dose-xrays. 3D allows the radiologist to see the breast tissue differently from 2D, which reduces the chance of repeat testing due to overlapping breast tissue. If you are interested in have a 3D mammogram, please check with your insurance before you arrive for your exam. On the day of your exam you will be asked if you would like 3D imaging.            Jun 08, 2017   Procedure with Ronni Connors DO   [facility] [place] (--)    [address]  [facility] [place] [id number] [phone]            Jun 13, 2017  2:00 PM CDT   Return Sleep  Patient with Guanaco Kowalski MD   [facility] ([facility])    [address] [address] [address]   [phone]            Jul 10, 2017 11:00 AM CDT   (Arrive by 10:45 AM)   Return Visit with Alina Jennings MD   [facility] ([facility])    [address] [place] [id number] [phone]            Jul 20, 2017  3:30 PM CDT   (Arrive by 3:15 PM)   Post-Op with Elizabeth Oliver MD   [facility]state [facility] Urologic Cancers ([facility])    [address] [place] [id number] [phone]            Sep 08, 2017 10:00 AM CDT   (Arrive by 9:45 AM)   New Patient Visit with VICTOR M Floyd [facility] ([facility])    [address] [place] [id number] [phone]            Nov 10, 2017  9:20 AM CST   (Arrive by 9:05 AM)   RETURN DIABETES with Michelle Irizarry MD   [facility] ([facility])    [address] [place] [id number] [phone]              Who to contact     Please call your clinic at [phone] to:    Ask questions about your health    Make or cancel appointments    Discuss your medicines    Learn about your test results    Speak to your doctor   If you have compliments or concerns about an experience at your clinic, or if you wish to file a complaint, please contact [facility] Patient Relations at [phone] or email us at [email or web address]         Additional Information About Your Visit        Sunpremehart Information     mygola is an electronic gateway that provides easy, online access to your medical records. With mygola, you can request a clinic appointment, read  your test results, renew a prescription or communicate with your care team.     To sign up for [employer] visit the website at [email or web address]   You will be asked to enter the access code listed below, as well as some personal information. Please follow the directions to create your username and password.     Your access code is: [email or web address]  Expires: 2017 11:52 AM     Your access code will  in 90 days. If you need help or a new code, please contact your [facility] or call [phone] for assistance.        Care EveryWhere ID     This is your Care EveryWhere ID. This could be used by other organizations to access your [place] medical records  [id number]        Your Vitals Were     Pulse                   73            Blood Pressure from Last 3 Encounters:   17 174/73   17 174/80   05/15/17 130/78    Weight from Last 3 Encounters:   17 60.3 kg (133 lb)   05/10/17 60.3 kg (133 lb)   17 60.3 kg (133 lb)              We Performed the Following     Cystoscopy ([address])     PAC Visit Referral (For [facility] Only)     Tonia-Operative Worksheet  (Urology General)          Today's Medication Changes          These changes are accurate as of: 17 11:59 PM.  If you have any questions, ask your nurse or doctor.               These medicines have changed or have updated prescriptions.        Dose/Directions    atorvastatin 40 MG tablet   Commonly known as:  LIPITOR   This may have changed:  when to take this   Used for:  Hyperlipidemia LDL goal <100        Dose:  40 mg   Take 1 tablet (40 mg) by mouth daily   Quantity:  90 tablet   Refills:  3       clopidogrel 75 MG tablet   Commonly known as:  PLAVIX   This may have changed:  when to take this   Used for:  PAD (peripheral artery disease) (H), UGI bleed, Osteoporosis, Nausea        Dose:  75 mg   Take 1 tablet (75 mg) by mouth daily   Quantity:  90 tablet   Refills:  3       ondansetron 4 MG ODT tab    Commonly known as:  ZOFRAN-ODT   This may have changed:    - when to take this  - reasons to take this   Used for:  Intractable vomiting with nausea, unspecified vomiting type        Dose:  4 mg   Take 1 tablet (4 mg) by mouth every 6 hours   Quantity:  120 tablet   Refills:  0       Phenytoin Sodium Extended 200 MG Caps   This may have changed:  additional instructions   Used for:  Seizure disorder (H)        Dose:  200 mg   Take 200 mg by mouth 2 times daily   Quantity:  60 capsule   Refills:  0            Where to get your medicines      These medications were sent to [facility] [place] - [address]  [address], [place] [address]     Phone:  [phone]     metoprolol 25 MG 24 hr tablet                Primary Care Provider Office Phone # Fax #    Allan Casey [phone]       [facility] [address] [place] [phone]        Thank you!     Thank you for choosing [facility] UROLOGY AND [facility] FOR PROSTATE AND UROLOGIC CANCERS  for your care. Our goal is always to provide you with excellent care. Hearing back from our patients is one way we can continue to improve our services. Please take a few minutes to complete the written survey that you may receive in the mail after your visit with us. Thank you!             Your Updated Medication List - Protect others around you: Learn how to safely use, store and throw away your medicines at www.disposemymeds.org.          This list is accurate as of: 5/18/17 11:59 PM.  Always use your most recent med list.                   Brand Name Dispense Instructions for use    ADVAIR DISKUS 250-50 MCG/DOSE diskus inhaler   Generic drug:  fluticasone-salmeterol      Inhale 1 puff into the lungs 2 times daily       * albuterol (2.5 MG/3ML) 0.083% neb solution     360 mL    INHALE 1 VIAL VIA NEBULIZER EVERY 6 HOURS AS NEEDED       * albuterol 108 (90 BASE) MCG/ACT Inhaler     VENTOLIN HFA    1 Inhaler    Inhale 1-2 puffs into the lungs every 6 hours as needed       aspirin 81 MG EC tablet     90 tablet    Take 1 tablet (81 mg) by mouth daily       atorvastatin 40 MG tablet    LIPITOR    90 tablet    Take 1 tablet (40 mg) by mouth daily       blood glucose monitoring meter device kit     1 kit    Use to test blood sugars 3 times daily or as directed.       blood glucose monitoring test strip    ONE TOUCH ULTRA    3 Box    Use to test blood sugars 3 times daily or as directed.       calcium citrate-vitamin D 315-250 MG-UNIT Tabs per tablet    CITRACAL    120 tablet    Take 2 tablets by mouth daily       cetirizine 10 MG tablet    zyrTEC    90 tablet    Take 1 tablet (10 mg) by mouth daily as needed for allergies       clopidogrel 75 MG tablet    PLAVIX    90 tablet    Take 1 tablet (75 mg) by mouth daily       CYANOCOBALAMIN PO      Take 2,000 mcg by mouth daily       cyclobenzaprine 10 MG tablet    FLEXERIL    30 tablet    Take 0.5-1 tablets (5-10 mg) by mouth 3 times daily as needed for muscle spasms       diclofenac 1 % Gel topical gel    VOLTAREN    100 g    Apply 2 g topically 4 times daily to hands       dorzolamide 2 % ophthalmic solution    TRUSOPT     Place 1 drop into both eyes 2 times daily       * EASY TOUCH LANCETS 30G/TWIST Misc          * thin lancets    NO BRAND SPECIFIED    1 Box    Use to test blood sugar 3 times daily or as directed.       * blood glucose monitoring lancets     3 Box    Use to test blood sugar 3 times daily or as directed.       EPINEPHrine 0.15 MG/0.3ML injection    EPIPEN JR    0.6 mL    Inject 0.3 mLs (0.15 mg) into the muscle as needed for anaphylaxis       escitalopram 10 MG tablet    LEXAPRO    90 tablet    Take 1 tablet (10 mg) by mouth daily       estradiol 1 MG tablet    ESTRACE    90 tablet    Take 1 tablet (1 mg) by mouth daily       ferrous sulfate 325 (65 FE) MG tablet    IRON    60 tablet    Take 1 tablet (325 mg) by mouth 2 times daily  With meals       fluticasone 50 MCG/ACT spray    FLONASE     Spray 1 spray into both nostrils daily       hydrochlorothiazide 12.5 MG capsule    MICROZIDE    90 capsule    Take 1 capsule (12.5 mg) by mouth daily       HYDROmorphone 2 MG tablet    DILAUDID    30 tablet    Take 1 tablet (2 mg) by mouth every 3 hours as needed for moderate to severe pain       RENÉE Pack      Take 1 packet by mouth 2 times daily       latanoprost 0.005 % ophthalmic solution    XALATAN    2.5 mL    Place 1 drop into both eyes At Bedtime       levETIRAcetam 500 MG tablet    KEPPRA    180 tablet    Take 1 tablet (500 mg) by mouth 2 times daily       lidocaine 5 % Patch    LIDODERM    30 patch    Apply from 1 to 3 patches to painful area at once for up to 12 h within a 24 h period.  Remove after 12 hours.       lisinopril 10 MG tablet    PRINIVIL/ZESTRIL    90 tablet    Take 1 tablet (10 mg) by mouth daily       LYRICA 100 MG capsule   Generic drug:  pregabalin      Take 100 mg by mouth 2 times daily       MEDICATION GIVEN BY INTRATHECAL PUMP - INSTRUCTION      continuous May 19, 2017 - per [facility] [place] [phone]: Conc: Bupivacaine 20 mg/mL and Fentanyl 2000 mcg/mL. Continuous: Bupivacaine 7.265 mg/day and Fentanyl 726.5 mcg/day. Boluses: Up to 7 boluses per 24-hr period of Bupivicaine 0.599 mg and Fentanyl 59.9 mcg  Pump Refill Date at [facility]: 7/2/17       metoprolol 25 MG 24 hr tablet    TOPROL-XL    30 tablet    Take 1 tablet (25 mg) by mouth daily       MULTIVITAMIN PO      Take 1 tablet by mouth daily       nitroglycerin 0.4 MG sublingual tablet    NITROSTAT    25 tablet    Place 1 tablet (0.4 mg) under the tongue every 5 minutes as needed for chest pain if you are still having symptoms after 3 doses (15 minutes) call 911.       ondansetron 4 MG ODT tab    ZOFRAN-ODT    120 tablet    Take 1 tablet (4 mg) by mouth every 6 hours       * order for DME     1 Month    Equipment being  ordered: Depends briefs       * order for DME     1 Device    Equipment being ordered: Power Wheelchair       * order for DME     1 Units    Equipment being ordered: Nebulizer and supplies       * order for DME     1 Box    Equipment being ordered: Glucerna daily shakes with each meal       * order for DME     1 Device    ACcu check BID       * order for DME     1 Units    Equipment being ordered: Nebulizer and tubing supplies       * order for DME     1 Device    Equipment being ordered: CPAP supplies mask, hose, filters, cushion fax to [facility] at [phone] Disp: 10 DeviceRefills: prn Class: Local PrintStart: 2/10/2017       * order for DME     10 Device    Equipment being ordered: CPAP supplies mask, hose, filters, cushion  fax to [facility] at [phone]       * order for DME     1 Device    Equipment being ordered: wheelchair seat cushion       pantoprazole 40 MG EC tablet    PROTONIX     Take 40 mg by mouth daily       Phenytoin Sodium Extended 200 MG Caps     60 capsule    Take 200 mg by mouth 2 times daily       prochlorperazine 10 MG tablet    COMPAZINE    20 tablet    Take 1 tablet (10 mg) by mouth every 8 hours as needed       risperiDONE 0.5 MG tablet    risperDAL     Take 0.5 mg by mouth At Bedtime       SPIRIVA HANDIHALER 18 MCG capsule   Generic drug:  tiotropium     30 capsule    INHALE THE CONTENTS OF 1 CAPSULE VIA INHALATION DEVICE DAILY       sucralfate 1 GM tablet    CARAFATE    40 tablet    Take 1 tablet (1 g) by mouth 4 times daily May dissolve in 10 mL water is necessary. (Start upon completion of carafate suspension)       traZODone 100 MG tablet    DESYREL    90 tablet    Take 1 tablet (100 mg) by mouth At Bedtime       vitamin D 2000 UNITS tablet     100 tablet    Take 2,000 Units by mouth daily.       zinc 50 MG Tabs      Take 1 tablet by mouth daily       * Notice:  This list has 14 medication(s) that are the same as other medications prescribed for you. Read the directions  carefully, and ask your doctor or other care provider to review them with you.

## 2017-05-19 ENCOUNTER — OFFICE VISIT (OUTPATIENT)
Dept: SURGERY | Facility: CLINIC | Age: 67
End: 2017-05-19

## 2017-05-19 ENCOUNTER — ALLIED HEALTH/NURSE VISIT (OUTPATIENT)
Dept: SURGERY | Facility: CLINIC | Age: 67
End: 2017-05-19

## 2017-05-19 ENCOUNTER — ANESTHESIA EVENT (OUTPATIENT)
Dept: SURGERY | Facility: CLINIC | Age: 67
End: 2017-05-19

## 2017-05-19 VITALS
BODY MASS INDEX: 20.83 KG/M2 | SYSTOLIC BLOOD PRESSURE: 174 MMHG | OXYGEN SATURATION: 100 % | RESPIRATION RATE: 18 BRPM | HEART RATE: 80 BPM | WEIGHT: 133 LBS | TEMPERATURE: 98.3 F | DIASTOLIC BLOOD PRESSURE: 73 MMHG

## 2017-05-19 DIAGNOSIS — Z01.818 PREOP EXAMINATION: Primary | ICD-10-CM

## 2017-05-19 ASSESSMENT — ENCOUNTER SYMPTOMS: SEIZURES: 1

## 2017-05-19 ASSESSMENT — COPD QUESTIONNAIRES: COPD: 1

## 2017-05-19 ASSESSMENT — LIFESTYLE VARIABLES: TOBACCO_USE: 1

## 2017-05-19 NOTE — ANESTHESIA PREPROCEDURE EVALUATION
"  Anesthesia Evaluation     . Pt has had prior anesthetic. Type: General    No history of anesthetic complications          ROS/MED HX    ENT/Pulmonary: Comment: [place] - uses \"Pocket talker\" to enhance hearing.      (+)sleep apnea, other ENT (Wears corrective lenses.  Cannot see out of left eye.  Limited right eye vision with no peripheral vision. Legally blind. )- tobacco use (Quit 15 years ago.  Smoked 1-2 PPD for 30 years.), Past use Moderate Persistent asthma Last exacerbation: > 1year ago,Treatment: Inhaled steroids and Nebulizer daily,  COPD, uses CPAP , . .    Neurologic:     (+)neuropathy - fingers, seizures last seizure:  Petit Mal 4-5  time per day.  Grand Mal>15 years ago. CVA (Cerebral vascular dz,s/p stent to left carotid artery 2005.) date: 2005, 2007 & 2008 with deficits- Presented with left side weakness and vision loss.    ,     Cardiovascular: Comment: PVD and thrombosis of LLE, s/p left AKA 6/6/2016   PVD RLE, s/p right AKA 11/23/2016.        (+) hypertension--CAD, -past MI (Inferior MI 9/2016.),-stent (s/p COLLEEN to pRCA and mRCA),9/2016   2 Drug Eluting Stent .. Taking blood thinners Pt has received instructions: . CHF (Plavix started September 2016 post stent placement.   ASA daily.) etiology: diastolic heart failure Last EF: 55-60% date: 9/2016 . . :. . Previous cardiac testing Echodate:results:date: results:ECG reviewed date: results:Cath date: results:          METS/Exercise Tolerance: Comment: Patient lives in assisted living.  Is able to dress herself, but does need help with showering. 1 - Eating, dressing   Hematologic:     (+) History of blood clots (Left LE  prior to AKA.) pt is not anticoagulated, Anemia, History of Transfusion previous transfusion reaction - \"hives\" 2012, -      Musculoskeletal: Comment: DDD  Chronic low back pain  (+) arthritis (hands), , , -       GI/Hepatic: Comment: Chronic dysphagia   Esophageal strictures and previous dilations.    (+) GERD Asymptomatic on " medication, appendicitis (s/p appendectomy), cholecystitis/cholelithiasis (s/p cholecystectomy),       Renal/Genitourinary:     (+) Other Renal/ Genitourinary, Frequent UTI's      Endo:     (+) type II DM Last HgA1c: 5.0 date: 9/2016 Not using insulin - not using insulin pump Normal glucose range: 's not previously admitted for DM/DKA .      Psychiatric:     (+) psychiatric history anxiety, depression and other (comment) (Schizoaffective disorder)      Infectious Disease:  - neg infectious disease ROS       Malignancy:      - no malignancy   Other: Comment: Chronic pain from neuropathy and PVD  Intrathecal pump with bupivacaine and fentanyl located in right lower back.   (+) No chance of pregnancy C-spine cleared: N/A, H/O Chronic Pain,H/O chronic opiod use , other significant disability Wheelchair bound                   Physical Exam      Airway   Mallampati: II  TM distance: >3 FB  Neck ROM: full    Dental   Comment: endnulous    Cardiovascular   Rhythm and rate: regular and normal      Pulmonary    breath sounds clear to auscultation    Other findings: Carotid bruit heard bilaterally.  Lab Results      Component                Value               Date                      WBC                      9.5                 04/19/2017            Lab Results      Component                Value               Date                      RBC                      3.92                04/19/2017            Lab Results      Component                Value               Date                      HGB                      12.0                04/19/2017            Lab Results      Component                Value               Date                      HCT                      36.2                04/19/2017            Lab Results      Component                Value               Date                      MCV                      92                  04/19/2017            Lab Results      Component                Value                Date                      MCH                      30.6                04/19/2017            Lab Results      Component                Value               Date                      MCHC                     33.1                04/19/2017            Lab Results      Component                Value               Date                      RDW                      13.3                04/19/2017            Lab Results      Component                Value               Date                      PLT                      277                 04/19/2017              Last Basic Metabolic Panel:  Lab Results      Component                Value               Date                      NA                       140                 04/28/2017             Lab Results      Component                Value               Date                      POTASSIUM                4.3                 04/28/2017            Lab Results      Component                Value               Date                      CHLORIDE                 106                 04/28/2017            Lab Results      Component                Value               Date                      CAROL                      9.1                 04/28/2017            Lab Results      Component                Value               Date                      CO2                      28                  04/28/2017            Lab Results      Component                Value               Date                      BUN                      14                  04/28/2017            Lab Results      Component                Value               Date                      CR                       0.54                04/28/2017            Lab Results      Component                Value               Date                      GLC                      96                  04/28/2017                HgbA1C 5.0 9/2016     Procedures  12 lead ECG 4/19/2017:  SR 63 BPM  Bilateral carotid duplex Doppler ultrasound  dated 3/13/2017 10:37 AM.  US CAROTID BILATERAL     Clinical history: 67-year-old male with a history of left carotid  stent.  Impression:     1. Right carotid: Echogenic plaque in the internal carotid artery  without focally elevated velocities, corresponding to less than 50%  stenosis of the right internal carotid artery by velocity criteria.     2. Left carotid stent: Patent left internal carotid artery stent a  without significant focally elevated velocities, with several areas of  echogenic plaque which shadow out the vessel lumen. Findings are  compatible with less than 50% stenosis by velocity criteria, however  the significant plaque shadowing makes accurate evaluation of  velocities difficult.     Echocardiogram 12/22/2016  Interpretation Summary  Left ventricular size is normal.  The Ejection Fraction is estimated at 35-40%.  Inferior wall akinesis is present.  Septal,basal to mid anterior wall akinesis.  Right ventricular function, chamber size, wall motion, and thickness are  normal.  Compared to prior study,the wallmotion abnormality is new.    Cardiac catheterization 9/17/2017  Diagnosis  1. 1-vessel coronary artery disease (RCA), without left main lesion.  2. Successful angioplasty (COLLEEN) of the proximal RCA.  3. Successful angioplasty (COLLEEN) of the mid RCA.    EGD 1/18/2017  Impression:          - Diffuse mucosal abnormality characterized by                        chronically-scarred mucosa from prior healed severe                        esophagitis was found in the lower third of the                        esophagus, notable pitting of mucosa with small                        pseudodiverticulum on this exam (likely from chronic                        severe erosive esophagitis in setting of longstanding                        multifactoral reflux).                        - White adherent nummular lesions were noted in the                        upper/middle third of the esophagus, noted without                         underlying erythema or esophageal inflammation. Possible                        food residue, cannot completely rule out candidal                        infection on this exam. Given report dysphagia +                        odynophagia in this patient with multiple comorbidities,                        empiric antifungal treatment for esophageal candidiasis                        may need to be considered.                        - Esophagus otherwise normal on careful exam. Interval                        healing of prior erosive esophagitis.                        - Medium-sized hiatus hernia.                        - Localized area of moderately erythematous mucosa with                        oozing was found on the greater curvature of the                        stomach. Biopsied to rule out superimposed viral                        infection vs. medication effect (NSAIDs).                        - A few diminutive sessile polyps were found in the                        gastric body, appearance consistent with benign fundic                        gland polyps.                        - Stomach otherwise normal on careful exam.                        - Normal examined duodenum.                PAC Discussion and Assessment    ASA Classification: 3  Case is suitable for: [place]  Anesthetic techniques and relevant risks discussed: PAC Recommendations anesthetic techniques: [facility] anesthesia.  Invasive monitoring and risk discussed:   Types:   Possibility and Risk of blood transfusion discussed:   NPO instructions given:   Additional anesthetic preparation and risks discussed:   Needs early admission to pre-op area:   Other:     PAC Resident/NP Anesthesia Assessment:  Sonya Foote is a 67 year old female scheduled for Cystoscopy with Suprapubic Tube Placement with Dr. Connors on 6/8/2017 at [facility] under [facility].  Ms. Foote has a very complex patient  with left and right AKA, 6/2016 and 11/2016, respectively.  Since her amputations, she has found toileting quite difficult when out and about as she report handicap restroom stalls are not compatible with a person who has had bilateral AKA.  This has significantly impacted her social outings and presented in urology consultation on 3/22/2017 requesting a catheter.  The above procedure was offered to patient by Dr. Oliver and she would like to proceed.      Ms. Foote presents to PAC and would like to proceed with above procedure.  She denies any chest pain, PND, dyspnea, cough, sore throat, fever or change in bowel or bladder habits.      PAC referral for risk assessment and optimization of anesthesia with comorbid conditions of:  CAD, s/p inferior MI with COLLEEN to pRCA & mRCA 9/17/2016; HTN; HLD; PVD, s/p bilateral AKA;  h/o LLE blood clot prior to amputation; sickle cell trait; h/o anemia;h/o transfusion; JOSE; COPD; asthma; GERD; dysphagia with history of esophageal stricture, s/p dilation; h/o esophageal candidas, last EGD (1/2017) without evidence; NIDDM;neuropahty; chronic low back pain; DDD; chronic pain with intrathecal pump; seizure disorder; h/o CVA, s/p stent to left carotid; depression/anxiety; and schizoaffective disorder.     Cardiology - RCRI : Coronary Artery Disease (MI, positive stress test, angina, Qs on EKG), Heart Failure & CVA; .  11 % risk of major adverse cardiac event. METS ~1.  Patient is WC bound >> is able to dress herself.       - CAD, s/p inferior MI with COLLEEN to pRCA and mRCA 9/17/2017.  On ASA and Plavix.  Followed by Dr. Anderson, cardiology.  Denies any cardiac symptoms. Contacted Yi Mathews from urology that cardiology's recommendations are for patient to remain on dual antiplatelet therapy one year post COLLEEN placement.  They will be talking with cardiology for plan.         - HTN, take beta blocker DOS.  Hold ACE-I and diuretic DOS.  BP elevated at today's visit.  Recommend monitoring  "and f/u with PCP for further management.       - HLD, take statin as prescribed DOS       - Cerebral vascular disease, h/o CVA X3  with residual of left eye blindness.  Status post stent to left carotid.  Carotid US less than 50% stenosis in left and right carotid.       - Heart failure - EF 35-40% (12/2016)       - PVD, s/p AKA left 6/2016 and right 11/2016  Pulmonary - remote smoking history, ~ 40 pack years       - JOSE, uses CPAP with O2 at night.  Will bring with DOS       - COPD/asthma, stable on scheduled inhalers.  Use inhalers DOS and bring to hospital  Hematology - h/o anemia. Take FeSO4. H/o blood transfusion with reaction 2012 \"hives\". Will get Type and Screen DOS.  Patient has been instructed to arrive early.       - Sickle Cell trait  GI - GERD, take PPI and Carafate DOS       - Dysphagia       - Esophageal stricture with h/o dilation       - H/O Esophageal candidas, last EGD 1/2017  Renal - Cr 0.5, GFR>90  Endocrine - NIDDM, diet managed.  HgbA1C 5.0 (9/2016)  Musculoskeletal - Recommend careful positioning given chronic pain.   Neuro - Seizure disorder, take Keppra and phenytoin.  Petit mal seizures daily. Grand Mal >10years ago.       - Neuropathy, take pregabalin DOS        - Depression/anxiety, take antidepressants DOS       - Schizoaffective disorder  HEENT - Legally blind.  Edentulous.  Limited neck ROM.   - plan for suprapubic tube placement for functional urinary incontinence.  Negative urinary culture on 5/8/2017.  Patient understands that she will need a UA one week prior to surgery per urologies orders.      She has the following specific operative considerations:   - Anesthesia considerations:  Refer to PAC assessment in anesthesia records  - VTE risk:  1.8%  - Risk of PONV score = 3.  If > 2, anti-emetic intervention recommended.  - Chronic pain >> intrathecal pump with bupivacaine and fentanyl.  Takes dilaudid for break through pain.  Please see pre-op pain consult for post-op pain " "management.    - Airway: records indicate \"easy\" intubation.    Arrival time, NPO, shower and medication instructions provided by nursing staff today.  Preparing For Your Surgery handout given.  Patient was discussed with Dr Willett. I spent 20 minutes face to face with patient, assessing, examining, and educating.      Reviewed and Signed by PAC Mid-Level Provider/Resident  Mid-Level Provider/Resident: Paty DOW CNP  Date: 5/19/2017  Time: 17:25    Attending Anesthesiologist Anesthesia Assessment:  I have examined the patient and reviewed the chart.  I have discussed the patient with the KENNY and concur with her assessment.  The patient is medically complex:     1) CADz - s/p COLLEEN 9/22/2016 in [facility].  EF at that time 60%.  During admission for UTI 12/2016 noted to have EF 35-40%.  She was seen by cardiology at that time and was felt to have a stress cardiomyopathy 2nd to sepsis. No therapy recommended beyond continueing DAPT.  Has been asymptomatic since 12/2016 but in wheel chair with < 4 MET activity     2) Severe PVDz.  S/P bilateral AKA (last 11/2016 after MI and on plavix), s/p right sided CVA with left sided weakness AND left Carotid stenting.  Recent duplex shows adequate flow bilaterlly.     3)  COPD/JOSE - on CPAP with O2 at night and scheduled MDIs.  Patient stable by history and has normal lung exam today.     4)  Chronic pain - has IT pump and on dilaudid     5) GERD/gastritis.  Had GI bleed attributed to gastritis.  Remained on plavix because of stent and CVA.  Scheduled for EGD dilation 9/2017 because of dysphagia.    The patient is only 8 months s/p COLLEEN and has history of CVA/arterial thrombus necessitating AKA.  Unless surgery is urgent would recommend not stopping DAPT until 10/2017.  If can not perform surgery in presence of plavix would recommend delay.  If surgeon wished to proceed and withhold plavix need cardiology approval.  Surgeon is being contacted now.    Given history of COPD, " nocturnal O2 use, CPAP, CVA and chronic pain with multiple interventions this patient should be admitted overnight for observation.  Not suitable for ASC.             Reviewed and Signed by PAC Anesthesiologist  Anesthesiologist: Don Willett MD  Date: 05/19/2017  Time:   Pass/Fail:   Disposition:     PAC Pharmacist Assessment:  PREOPERATIVE PAIN CONSULT FOR POSTOPERATIVE PAIN MANAGEMENT  Sonya Foote was seen and interviewed during time of PAC Clinic appointment May 19, 2017. The following recommendations ONLY apply for the planned surgical procedure with Dr. Connors on 6/8/17 at the [facility] for  cystostomy and suprapubic tube insertion.  Currently Ms. Foote is scheduled for a same-day surgery, these recommendations are intended for if she requires inpatient care after surgery. These recommendations are only valid for 30 days from the date of service. If there are significant changes in opioid dosing between today and day of surgery the below recommendations may have to be adjusted.   Verbal consent was given by patient to access pharmacy records and [place] Prescription Monitoring Profile: Yes    Based on [place] Prescription Monitoring Profile and patient interview:     - OUTPATIENT MEDICATIONS (related to pain management):  -- Long-acting opioid: none  -- Short-acting opioid: hydromorphone  2mg PO PRN (it appears this is from an old script), takes rarely, uses 0-2 tablets/day.   -- Intrathecal pump:    May 19, 2017 - per [facility] in [place] [phone]:  Conc: Bupivacaine 20 mg/mL and Fentanyl 2000 mcg/mL.  Continuous: Bupivacaine 7.265 mg/day and Fentanyl 726.5 mcg/day.  Boluses: Up to 7 boluses per 24-hr period of Bupivicaine 0.599 mg and Fentanyl 59.9 mcg    Pump Refill Date at [facility]: 7/2/17    -- Oral adjuvant(s): pregabalin 100 mg PO BID, was recently taken off gabapentin (was on both for a few months at the beginning of 2017.     --Other: trazodone, risperidone, phenytoin, keppra, cyclobenzaprine 10 mg PO TIDP RN    ASSESSMENT:   Sonya Foote is known to our inpatient pain management service. She has a history of chronic low back pain, history of PVD and bilateral AKA, now wheelchair bound. PMH also significant for CAD, CVA, seizure disorder, JOSE (uses CPAP).  She is opioid tolerant, on relatively high doses of opioid analgesic at stable doses via her IT pump managed [facility] Trejo at [facility].  She feels that the IT pump with boluses works well for her chronic pain and that she is overall happy with it and with Dr. Trejo's management of her pain.  She reports she has done well with nerve blocks for previous procedures and tolerated them well.  She is scheduled for above procedure which is scheduled for a same day procedure.  In the chance that she is admitted to the hospital postoperatively the following recommendations were placed. Patient reports she tolerates dilaudid well and would prefer to stick with this if needing PO pain medications while in the hospital.      Patient has received PCA in the past without complication.        RECOMMENDATIONS:   The following pain management recommendations are made based on information from today's visit and should not replace medical decision-making based on patient condition at the time of surgery or postoperatively.      - PREOPERATIVE:  -- Continue IT Pump per Dr. Trejo.  Patient is not due for a refill until 7/2/17 at the earliest.  Would not recommend using PTM device while inpatient.   -- Continue short acting opioid - hydromorphone 2 mg PO q3 hPRN - uses rarely (between 0 and 4 mg/day)  -- Continue adjuvants/nonopioid analgesics as prescribed by your outpatient pain provider.   --  Before surgery recommend take your home dose of pregabalin pre procedure prior to coming to the hospital.     -- Before surgery consider acetaminophen 975 mg PO in pre-op (will need to be written on day of surgery  "in pre-op)  -- Before surgery consider celecoxib 200 mg PO in pre-op (Defer to surgery - will need to be written on day of surgery in pre-op area)    - INTRAOPERATIVE (Anesthesiologist/CRNA to consider):   -- Regional anesthesia: per [facility] team  -- Avoid remifentanil - to reduce risk of developing hyperalgesia    - POSTOPERATIVE MANAGEMENT:  Place pain management consult to assist with acute postoperative pain management if patient is admitted postoperatively.   Opioid analgesic:  + Hydromorphone 2-4 mg PO q3 hr PRN (use 2 mg dose first)  + No changes to continuous rate in IT pump. Place \"medication given by intrathecal pump order on admission:  + Avoid using Patient administered doses via IT pump while on PO dilaudid in the hospital.     Nonopioid analgesics:   + Defer use of NSAID to surgeon  + Start acetaminophen 975 mg PO every 8 hr scheduled   + Continue pregabalin 100 mg PO q12 hr    Muscle Relaxant:   + Continue home cyclobenzaprine 10 mg PO TID PRN    Stool softeners/Laxatives:   + When appropriate start senna-docusate 1-2 tabs PO BID and Miralax 17 g daily PRN to prevent opioid induced constipation.     Other:  + Recommend close monitoring of respiratory status postoperatively with capnography and SpO2 monitoring.   + Continue other outpatient meds   + If no block is done, start lidoderm 1-3 patches q24 hr (applied adjacent to surgical site (intact skin only, 12 hr on 12 hr off).     The inpatient pain service will follow up on patient after consult is placed and offer further recommendations for pain management.  If immediate assistance is needed please contact the pain service at the number below.     Alonzo Wilson [facility]  May 19, 2017  10:38 AM    If questions or concerns, please contact the Inpatient Pain Management Service:  Call [phone] after hours, weekends and holidays.   Page [phone] from 8 AM - 3 PM Mon - Fri.    Reviewed and Signed by PAC Pharmacist  Pharmacist: Alonzo Wilson  Date: " 5/19/17  Time:11:21                           .

## 2017-05-19 NOTE — PATIENT INSTRUCTIONS
Preparing for Your Surgery      Name:  Sonya Foote   MRN:  [id number]   :  1950   Today's Date:  2017     Arriving for surgery:  Surgery date:  17  Surgery time:  1:15 PM  Arrival time:  11:15 AM  Please come to:       [facility] [address]  [address] [place]  [address]    -   parking is available in front of the hospital from 5:15 am to 8:00 pm    -  Stop at the Information Desk in the lobby    -   Inform the information person that you are here for surgery. An escort to 3c will be provided. If you would not like an escort, please proceed to 3C on the 3rd floor. [phone]     What can I eat or drink?  -  You may have solid food or milk products until 8 hours prior to your surgery.  (5:15 AM  -  You may have water, apple juice or 7up/Sprite until 2 hours prior to your surgery. (11:15 AM)    Which medicines can I take?       -  Hold vitamins/supplements for one week before surgery.      -   Follow instructions regarding Aspirin and Plavix from Cardiology  -  Do NOT take these medications in the morning, the day of surgery: Lisinopril, Hydrochlorothiazide    -  Please take these medications the day of surgery: all other scheduled morning medications, including inhalers    How do I prepare myself?  -  Take two showers: one the night before surgery; and one the morning of surgery.         Use Scrubcare or Hibiclens to wash from neck down.  You may use your own shampoo and conditioner. No other hair products.   -  Do NOT use lotion, powder, deodorant, or antiperspirant the day of your surgery.  -  Do NOT wear any makeup, fingernail polish or jewelry.  -  Begin using Incentive Spirometer 1 week prior to surgery.  Use 4 times per day, up to 5-10 breaths each time.  Bring Incentive Spirometer to hospital.  -Do not bring your own medications to the hospital, except for inhalers and eye drops.  -  Bring your ID and insurance card.    Questions or  Concerns:  If you have questions or concerns, please call the  [facility], Monday-Friday 7AM-7PM:  [phone]            AFTER YOUR SURGERY  Breathing exercises   Breathing exercises help you recover faster. Take deep breaths and let the air out slowly. This will:     Help you wake up after surgery.    Help prevent complications like pneumonia.  Preventing complications will help you go home sooner.   We may give you a breathing device (incentive spirometer) to encourage you to breathe deeply.   Nausea and vomiting   You may feel sick to your stomach after surgery; if so, let your nurse know.    Pain control:  After surgery, you may have pain. Our goal is to help you manage your pain. Pain medicine will help you feel comfortable enough to do activities that will help you heal.  These activities may include breathing exercises, walking and physical therapy.   To help your health care team treat your pain we will ask: 1) If you have pain  2) where it is located 3) describe your pain in your words  Methods of pain control include medications given by mouth, vein or by nerve block for some surgeries.    Sequential Compression Device (SCD) or Pneumo Boots:  You may need to wear SCD S on your legs or feet. These are wraps connected to a machine that pumps in air and releases it. The repeated pumping helps prevent blood clots from forming.             Using an Incentive Spirometer  Soon after your surgery, a nurse or therapist will teach you breathing exercises. These keep your lungs clear, strengthen your breathing muscles, and help prevent complications.  The exercises include doing a deep-breathing exercise using a device called an incentive spirometer.  To do these exercises, you will breathe in through your mouth and not your nose. The incentive spirometer only works correctly if you breathe in through your mouth.  Four steps to clear lungs     Deep breathing expands the lungs, aids circulation,  and helps prevent pneumonia.   1. Exhale normally.    Relax and breathe out.  2. Place your lips tightly around the mouthpiece.    Make sure the device is upright and not tilted.  3. Inhale as much air as you can through the mouthpiece (don't breath through your nose).    Inhale slowly and deeply.    Hold your breath long enough to keep the balls or disk raised for at least 3 seconds.    If you re inhaling too quickly, your device may make a tone. If you hear this tone, inhale more slowly.  4. Repeat the exercise regularly.    Do this exercise every hour while you're awake, or as your health care provider instructs.    You will also be taught coughing exercises and be asked to do them regularly on your own.    1256-0901 [employer]. [address] [place] [address], [place] [address]. All rights reserved. This information is not intended as a substitute for professional medical care. Always follow your healthcare professional's instructions.

## 2017-05-19 NOTE — H&P
Pre-Operative H & P     CC:  Preoperative exam to assess for increased cardiopulmonary risk while undergoing surgery and anesthesia.    Date of Encounter: 5/19/2017  Primary Care Physician:  Allan Casey  Sonya Foote is a 67 year old female who presents for pre-operative H & P in preparation for Cystoscopy with Suprapubic Tube Placement with Dr. Connors on 6/8/2017 at [facility] under [facility].  Ms. Foote has a very complex patient with left and right AKA, 6/2016 and 11/2016, respectively.  Since her amputations, she has found toileting quite difficult when out and about as she reports handicap restroom stalls are not compatible with a person who has had bilateral AKA.  This has significantly impacted her social outings and presented in urology consultation on 3/22/2017 requesting a catheter.  The above procedure was offered to patient by Dr. Oliver and she would like to proceed.      Ms. Foote presents to PAC and would like to proceed with above procedure.  She denies any chest pain, PND, dyspnea, cough, sore throat, fever or change in bowel or bladder habits.      PAC referral for risk assessment and optimization of anesthesia with comorbid conditions of:  CAD, s/p inferior MI with COLLEEN to pRCA & mRCA 9/17/2016; HTN; HLD; PVD, s/p bilateral AKA;  h/o LLE blood clot prior to amputation; sickle cell trait; h/o anemia;h/o transfusion; JOSE; COPD; asthma; GERD; dysphagia with history of esophageal stricture, s/p dilation; h/o esophageal candidas, last EGD (1/2017); NIDDM;neuropahty; chronic low back pain; DDD; chronic pain with intrathecal pump; seizure disorder; h/o CVA, s/p stent to left carotid; depression/anxiety; and schizoaffective disorder.     History is obtained from the patient and electronic health record.     Past Medical History  Past Medical History:   Diagnosis Date     Anemia      Antiplatelet or antithrombotic long-term use      Arthritis      AS  (sickle cell trait) (H) 10/8/2013     Blind left eye      BPPV (benign paroxysmal positional vertigo)      Chronic back pain      COPD (chronic obstructive pulmonary disease) (H)      Coronary artery disease      CVA (cerebral infarction) 10/8/2012    x3, residual left sided weakness     Diastolic CHF (H) 9/4/2012     Dilantin toxicity 5/31/2013     Esophageal candidiasis (H)      GERD (gastroesophageal reflux disease)      Heart attack (H)      Hernia, abdominal      History of blood transfusion     x5     History of thrombophlebitis      HTN (hypertension) 9/4/2012     Hyperlipidemia LDL goal <100 9/4/2012     Legally blind in right eye, as defined in [place]     No periphal vision right eye     Osteoporosis 1/21/2013     Other chronic pain      PAD (peripheral artery disease) (H)      Palpitations      Person who has had sex change operation 1/20/2014     Pneumonia      Schizoaffective disorder (H)      Seizure disorder (H) 9/4/2012     Seizures (H)      Sleep apnea     CPAP     Thrombosis of leg      Type 2 diabetes, HbA1C goal < 8% (H) 9/4/2012     Uncomplicated asthma      Unspecified cerebral artery occlusion with cerebral infarction        Past Surgical History  Past Surgical History:   Procedure Laterality Date     AMPUTATE LEG ABOVE KNEE Left 6/11/2016    Procedure: AMPUTATE LEG ABOVE KNEE;  Surgeon: Mello Rodriguez MD;  [facility]     AMPUTATE LEG BELOW KNEE Right 11/7/2016    Procedure: AMPUTATE LEG BELOW KNEE;  Surgeon: Savannah Durant MD;  [facility]     AMPUTATE REVISION STUMP LOWER EXTREMITY Right 11/11/2016    Procedure: AMPUTATE REVISION STUMP LOWER EXTREMITY;  Surgeon: Savannah Durant MD;  [facility]     AMPUTATE REVISION STUMP LOWER EXTREMITY Right 11/16/2016    Procedure: AMPUTATE REVISION STUMP LOWER EXTREMITY;  Surgeon: Savannah Durant MD;  [facility]     AMPUTATE TOE(S) Right 1/5/2016    Procedure: AMPUTATE TOE(S);  Surgeon: Mello Gaines DPM;  Location: [facility] [place]      ANGIOGRAM Bilateral 11/21/2014    Procedure: ANGIOGRAM;  Surgeon: Savannah Durant MD;  [facility]     ANGIOGRAM Left 1/16/2015    Procedure: ANGIOGRAM;  Surgeon: Savannah Durant MD;  [facility]     ANGIOGRAM Bilateral 9/14/2015    Procedure: ANGIOGRAM;  Surgeon: Savannah Durant MD;  [facility]     ANGIOGRAM Left 10/12/2015    Procedure: ANGIOGRAM;  Surgeon: Savannah Durant MD;  [facility]     ANGIOGRAM Right 6/6/2016    Procedure: ANGIOGRAM;  Surgeon: Savannah Durant MD;  [facility]     ANGIOPLASTY Right 6/6/2016    Procedure: ANGIOPLASTY;  Surgeon: Savannah Durant MD;  [facility]     APPENDECTOMY       BREAST SURGERY      right breast bx (benign)     CHOLECYSTECTOMY       COLONOSCOPY N/A 8/25/2014    Procedure: COLONOSCOPY;  Surgeon: Mello Ferrer MD;  [facility]     COLONOSCOPY WITH CO2 INSUFFLATION N/A 8/20/2014    Procedure: COLONOSCOPY WITH CO2 INSUFFLATION;  Surgeon: Duane, William Charles, MD;  [facility]     ENDARTERECTOMY FEMORAL  5/23/2014    Procedure: ENDARTERECTOMY FEMORAL;  Surgeon: Jason Joshi MD;  [facility]     ESOPHAGOSCOPY, GASTROSCOPY, DUODENOSCOPY (EGD), COMBINED  12/14/2012    Procedure: COMBINED ESOPHAGOSCOPY, GASTROSCOPY, DUODENOSCOPY (EGD), BIOPSY SINGLE OR MULTIPLE;  ESOPHAGOSCOPY, GASTROSCOPY, DUODENOSCOPY (EGD), DILATATION ;  Surgeon: Elizabeth Stevenson MD;  Location:  GI     ESOPHAGOSCOPY, GASTROSCOPY, DUODENOSCOPY (EGD), COMBINED  12/31/2013    Procedure: COMBINED ESOPHAGOSCOPY, GASTROSCOPY, DUODENOSCOPY (EGD), BIOPSY SINGLE OR MULTIPLE;;  Surgeon: Clemente Lopez MD;  [facility]     ESOPHAGOSCOPY, GASTROSCOPY, DUODENOSCOPY (EGD), COMBINED  4/1/2014    Procedure: COMBINED ESOPHAGOSCOPY, GASTROSCOPY, DUODENOSCOPY (EGD);;  Surgeon: Clemente Lopez MD;  [facility]     ESOPHAGOSCOPY, GASTROSCOPY, DUODENOSCOPY (EGD), COMBINED  6/28/2014    Procedure: COMBINED ESOPHAGOSCOPY, GASTROSCOPY, DUODENOSCOPY (EGD);  Surgeon: Clemente Lopez,  MD;  [facility]     ESOPHAGOSCOPY, GASTROSCOPY, DUODENOSCOPY (EGD), COMBINED N/A 8/20/2014    Procedure: COMBINED ESOPHAGOSCOPY, GASTROSCOPY, DUODENOSCOPY (EGD), BIOPSY SINGLE OR MULTIPLE;  Surgeon: Duane, William Charles, MD;  [facility]     ESOPHAGOSCOPY, GASTROSCOPY, DUODENOSCOPY (EGD), COMBINED N/A 8/22/2014    Procedure: COMBINED ESOPHAGOSCOPY, GASTROSCOPY, DUODENOSCOPY (EGD), BIOPSY SINGLE OR MULTIPLE;  Surgeon: Mello Ferrer MD;  [facility]     ESOPHAGOSCOPY, GASTROSCOPY, DUODENOSCOPY (EGD), COMBINED N/A 10/2/2014    Procedure: COMBINED ESOPHAGOSCOPY, GASTROSCOPY, DUODENOSCOPY (EGD), BIOPSY SINGLE OR MULTIPLE;  Surgeon: Remy Haskins MD;  [facility]     ESOPHAGOSCOPY, GASTROSCOPY, DUODENOSCOPY (EGD), COMBINED Left 12/15/2014    Procedure: COMBINED ESOPHAGOSCOPY, GASTROSCOPY, DUODENOSCOPY (EGD), BIOPSY SINGLE OR MULTIPLE;  Surgeon: Remy Haskins MD;  [facility]     ESOPHAGOSCOPY, GASTROSCOPY, DUODENOSCOPY (EGD), COMBINED N/A 2/25/2015    Procedure: COMBINED ENDOSCOPIC ULTRASOUND, ESOPHAGOSCOPY, GASTROSCOPY, DUODENOSCOPY (EGD), FINE NEEDLE ASPIRATE/BIOPSY;  Surgeon: Clemente Lugo MD;  [facility]     ESOPHAGOSCOPY, GASTROSCOPY, DUODENOSCOPY (EGD), COMBINED Left 2/25/2015    Procedure: COMBINED ESOPHAGOSCOPY, GASTROSCOPY, DUODENOSCOPY (EGD), BIOPSY SINGLE OR MULTIPLE;  Surgeon: Clemente Lugo MD;  [facility]     ESOPHAGOSCOPY, GASTROSCOPY, DUODENOSCOPY (EGD), COMBINED N/A 9/25/2016    Procedure: COMBINED ESOPHAGOSCOPY, GASTROSCOPY, DUODENOSCOPY (EGD);  Surgeon: Aziza Patiño MD;  [facility]     ESOPHAGOSCOPY, GASTROSCOPY, DUODENOSCOPY (EGD), COMBINED N/A 1/18/2017    Procedure: COMBINED ESOPHAGOSCOPY, GASTROSCOPY, DUODENOSCOPY (EGD), BIOPSY SINGLE OR MULTIPLE;  Surgeon: Clemente Lopez MD;  Location:  GI     FASCIOTOMY LOWER EXTREMITY Left 6/10/2016    Procedure: FASCIOTOMY LOWER EXTREMITY;  Surgeon: Mello Rodriguez MD;  [facility]     HC CAPSULE ENDOSCOPY N/A 8/25/2014    Procedure: CAPSULE/PILL CAM ENDOSCOPY;  Surgeon: Remy Haskins MD;  [facility]     HC CAPSULE ENDOSCOPY N/A 10/2/2014    Procedure: CAPSULE/PILL CAM ENDOSCOPY;  Surgeon: Remy Haskins MD;  Location:  GI     ORTHOPEDIC SURGERY      broken wrist repair     SEX TRANSFORMATION SURGERY, MALE TO FEMALE      1974     SINUS SURGERY      cyst removed     TONSILLECTOMY       VASCULAR SURGERY      Left carotid stent       Hx of Blood transfusions/reactions: yes with reaction 2012 \"hives\"    Hx of abnormal bleeding or anti-platelet use: Plavix and ASA    Menstrual history: No LMP recorded. Patient is not currently having periods (Reason: Amenorrhea).    Steroid use in the last year: Steroid in inhaler used daily    Personal or FH with difficulty with Anesthesia:  denies    Prior to Admission Medications  Current Outpatient Prescriptions   Medication Sig Dispense Refill     metoprolol (TOPROL-XL) 25 MG 24 hr tablet Take 1 tablet (25 mg) by mouth daily 30 tablet 0     lidocaine (LIDODERM) 5 % Patch Apply from 1 to 3 patches to painful area at once for up to 12 h within a 24 h period.  Remove after 12 hours. 30 patch 1     cyclobenzaprine (FLEXERIL) 10 MG tablet Take 0.5-1 tablets (5-10 mg) by mouth 3 times daily as needed for muscle spasms 30 tablet 1     cetirizine (ZYRTEC) 10 MG tablet Take 1 tablet (10 mg) by mouth daily as needed for allergies 90 tablet 3     escitalopram (LEXAPRO) 10 MG tablet Take 1 tablet (10 mg) by mouth daily 90 tablet 1     albuterol (VENTOLIN HFA) 108 (90 BASE) MCG/ACT Inhaler Inhale 1-2 puffs into the lungs every 6 hours as needed 1 Inhaler 11     diclofenac (VOLTAREN) 1 % GEL topical gel Apply 2 g topically 4 times daily to hands 100 g 11     EPINEPHrine (EPIPEN JR) 0.15 MG/0.3ML injection Inject 0.3 mLs (0.15 mg) into the muscle as needed for anaphylaxis 0.6 mL 1     lisinopril (PRINIVIL/ZESTRIL) 10 MG tablet Take 1 tablet (10 mg) " by mouth daily 90 tablet 3     pantoprazole (PROTONIX) 40 MG EC tablet Take 40 mg by mouth daily       pregabalin (LYRICA) 100 MG capsule Take 100 mg by mouth 2 times daily       risperiDONE (RISPERDAL) 0.5 MG tablet Take 0.5 mg by mouth At Bedtime       ferrous sulfate (IRON) 325 (65 FE) MG tablet Take 1 tablet (325 mg) by mouth 2 times daily With meals 60 tablet 2     blood glucose monitoring (ONE TOUCH ULTRA) test strip Use to test blood sugars 3 times daily or as directed. 3 Box 3     order for DME Equipment being ordered: wheelchair seat cushion 1 Device 0     order for DME Equipment being ordered: CPAP supplies mask, hose, filters, cushion    fax to [facility] at [phone] 10 Device prn     sucralfate (CARAFATE) 1 GM tablet Take 1 tablet (1 g) by mouth 4 times daily May dissolve in 10 mL water is necessary. (Start upon completion of carafate suspension) 40 tablet 5     order for DME Equipment being ordered: CPAP supplies mask, hose, filters, cushion fax to [facility] at [phone]  Disp: 10 Device Refills: prn   Class: Local Print Start: 2/10/2017 1 Device 0     order for DME Equipment being ordered: Nebulizer and tubing supplies 1 Units 0     albuterol (2.5 MG/3ML) 0.083% neb solution INHALE 1 VIAL VIA NEBULIZER EVERY 6 HOURS AS NEEDED 360 mL 11     order for DME ACcu check BID 1 Device 0     ondansetron (ZOFRAN-ODT) 4 MG ODT tab Take 1 tablet (4 mg) by mouth every 6 hours (Patient taking differently: Take 4 mg by mouth every 6 hours as needed ) 120 tablet 0     CYANOCOBALAMIN PO Take 2,000 mcg by mouth daily       Nutritional Supplements (RENÉE) PACK Take 1 packet by mouth 2 times daily       HYDROmorphone (DILAUDID) 2 MG tablet Take 1 tablet (2 mg) by mouth every 3 hours as needed for moderate to severe pain 30 tablet 0     fluticasone (FLONASE) 50 MCG/ACT nasal spray Spray 1 spray into both nostrils daily       ADVAIR DISKUS 250-50 MCG/DOSE diskus inhaler Inhale 1 puff into the lungs 2 times  daily        calcium citrate-vitamin D (CITRACAL) 315-250 MG-UNIT TABS Take 2 tablets by mouth daily (Patient taking differently: Take 2 tablets by mouth At Bedtime ) 120 tablet 5     hydrochlorothiazide (MICROZIDE) 12.5 MG capsule Take 1 capsule (12.5 mg) by mouth daily 90 capsule 3     estradiol (ESTRACE) 1 MG tablet Take 1 tablet (1 mg) by mouth daily 90 tablet 3     levETIRAcetam (KEPPRA) 500 MG tablet Take 1 tablet (500 mg) by mouth 2 times daily 180 tablet 1     traZODone (DESYREL) 100 MG tablet Take 1 tablet (100 mg) by mouth At Bedtime 90 tablet 3     aspirin EC 81 MG EC tablet Take 1 tablet (81 mg) by mouth daily (Patient taking differently: Take 81 mg by mouth every morning ) 90 tablet 3     clopidogrel (PLAVIX) 75 MG tablet Take 1 tablet (75 mg) by mouth daily (Patient taking differently: Take 75 mg by mouth At Bedtime ) 90 tablet 3     blood glucose monitoring (ONE TOUCH ULTRA 2) meter device kit Use to test blood sugars 3 times daily or as directed. 1 kit 0     blood glucose monitoring (ONE TOUCH ULTRASOFT) lancets Use to test blood sugar 3 times daily or as directed. 3 Box 3     phenytoin 200 MG CAPS Take 200 mg by mouth 2 times daily (Patient taking differently: Take 200 mg by mouth 2 times daily (takes at 8 AM and 8 PM)) 60 capsule 0     dorzolamide (TRUSOPT) 2 % ophthalmic solution Place 1 drop into both eyes 2 times daily        SPIRIVA HANDIHALER 18 MCG inhalation capsule INHALE THE CONTENTS OF 1 CAPSULE VIA INHALATION DEVICE DAILY 30 capsule 2     zinc 50 MG TABS Take 1 tablet by mouth daily       nitroglycerin (NITROSTAT) 0.4 MG SL tablet Place 1 tablet (0.4 mg) under the tongue every 5 minutes as needed for chest pain if you are still having symptoms after 3 doses (15 minutes) call 911. 25 tablet 1     MEDICATION GIVEN BY INTRATHECAL PUMP - INSTRUCTION continuous May 19, 2017 - per [facility] in [place] [phone]:  Conc: Bupivacaine 20 mg/mL and Fentanyl 2000  mcg/mL.  Continuous: Bupivacaine 7.265 mg/day and Fentanyl 726.5 mcg/day.  Boluses: Up to 7 boluses per 24-hr period of Bupivicaine 0.599 mg and Fentanyl 59.9 mcg    Pump Refill Date at [facility]: 7/2/17       latanoprost (XALATAN) 0.005 % ophthalmic solution Place 1 drop into both eyes At Bedtime 2.5 mL 11     atorvastatin (LIPITOR) 40 MG tablet Take 1 tablet (40 mg) by mouth daily (Patient taking differently: Take 40 mg by mouth At Bedtime ) 90 tablet 3     order for DME Equipment being ordered: Glucerna daily shakes with each meal 1 Box 11     prochlorperazine (COMPAZINE) 10 MG tablet Take 1 tablet (10 mg) by mouth every 8 hours as needed 20 tablet 0     thin (NO BRAND SPECIFIED) lancets Use to test blood sugar 3 times daily or as directed. 1 Box 10     ORDER FOR DME Equipment being ordered: Nebulizer and supplies 1 Units 0     ORDER FOR DME Equipment being ordered: Power Wheelchair 1 Device 0     ORDER FOR DME Equipment being ordered: Depends briefs 1 Month 11     EASY TOUCH LANCETS 30G/TWIST MISC        Cholecalciferol (VITAMIN D) 2000 UNITS tablet Take 2,000 Units by mouth daily. 100 tablet 3     Multiple Vitamin (MULTIVITAMIN OR) Take 1 tablet by mouth daily          Allergies  Allergies   Allergen Reactions     Bee Venom      Penicillins Anaphylaxis     Other reaction(s): Skin Rash and/or Hives     Dilantin [Phenytoin] Other (See Comments)     Generic dilantin only per pt     Iodide Hives     09/11/15: Pt states she can use iodine and doesn't have any problems      Iodine-131      Novocaine [Procaine] Hives     Other reaction(s): Skin Rash and/or Hives       Social History  Social History     Social History     Marital status:      Spouse name: Jayde     Number of children: 2     Years of education: N/A     Occupational History     mental health worker      retired     Social History Main Topics     Smoking status: Former Smoker     Packs/day: 2.50     Years: 30.00     Types: Cigarettes,  "Cigars     Quit date: 11/1/2000     Smokeless tobacco: Never Used     Alcohol use No     Drug use: No     Sexual activity: Yes     Other Topics Concern     Caffeine Concern No     1 in the morning     Social History Narrative    The patient is a former smoker.  She smoked 2.5 packs per year for approximately 30 years.  She quit in 2000 using Wellbutrin and patches.  Her father was in the  and she moved around a lot when she was a kid, and has lived abroad including in [facility] and the [facility].  She was previously employed as a mental health worker. Moved from [place] to [place] in 2012. She is currently living in a senior living apartment, and describes her relationship status as a \" demarco couple.\"  She manages her own medications.  She has no recent travel and no known TB exposures.         Family History  Family History   Problem Relation Age of Onset     Dementia Mother      Glaucoma Mother      DIABETES Mother      Coronary Artery Disease Mother      MI     Glaucoma Father      DIABETES Father      Heart Failure Father      CANCER Maternal Aunt      leukemia     Schizophrenia Brother      Depression Brother      Suicide Sister      Depression Sister      DIABETES Sister      CANCER Maternal Aunt      ovarian     Glaucoma Maternal Grandmother      DIABETES Maternal Grandmother      Glaucoma Maternal Grandfather      DIABETES Maternal Grandfather      Glaucoma Paternal Grandmother      DIABETES Paternal Grandmother      Glaucoma Paternal Grandfather      DIABETES Paternal Grandfather      Breast Cancer Sister      CEREBROVASCULAR DISEASE Brother          ROS/MED HX    ENT/Pulmonary: Comment: [place] - uses \"Pocket talker\" to enhance hearing.      (+)sleep apnea, other ENT (Wears corrective lenses.  Cannot see out of left eye.  Limited right eye vision with no peripheral vision. Legally blind. )- tobacco use (Quit 15 years ago.  Smoked 1-2 PPD for 30 years.), Past use Moderate Persistent asthma Last " "exacerbation: > 1year ago,Treatment: Inhaled steroids and Nebulizer daily,  COPD, uses CPAP , . .    Neurologic:     (+)neuropathy - fingers, seizures last seizure:  [place] 4-5  time per day.  Grand Mal>15 years ago. CVA (Cerebral vascular dz,s/p stent to left carotid artery 2005.) date: 2005, 2007 & 2008 with deficits- Presented with left side weakness and vision loss.    ,     Cardiovascular: Comment: PVD and thrombosis of LLE, s/p left AKA 6/6/2016   PVD RLE, s/p right AKA 11/23/2016.        (+) hypertension--CAD, -past MI (Inferior MI 9/2016.),-stent (s/p COLLEEN to pRCA and mRCA),9/2016   2 Drug Eluting Stent .. Taking blood thinners Pt has received instructions: . CHF (Plavix started September 2016 post stent placement.   ASA daily.) etiology: diastolic heart failure Last EF: 55-60% date: 9/2016 . . :. . Previous cardiac testing Echodate:results:date: results:ECG reviewed date: results:Cath date: results:          METS/Exercise Tolerance: Comment: Patient lives in assisted living.  Is able to dress herself, but does need help with showering. 1 - Eating, dressing   Hematologic:     (+) History of blood clots (Left LE  prior to AKA.) pt is not anticoagulated, Anemia, History of Transfusion previous transfusion reaction - \"hives\" 2012, -      Musculoskeletal: Comment: DDD  Chronic low back pain  (+) arthritis (hands), , , -       GI/Hepatic: Comment: Chronic dysphagia   Esophageal strictures and previous dilations.    (+) GERD Asymptomatic on medication, appendicitis (s/p appendectomy), cholecystitis/cholelithiasis (s/p cholecystectomy),       Renal/Genitourinary:     (+) Other Renal/ Genitourinary, Frequent UTI's      Endo:     (+) type II DM Last HgA1c: 5.0 date: 9/2016 Not using insulin - not using insulin pump Normal glucose range: 's not previously admitted for DM/DKA .      Psychiatric:     (+) psychiatric history anxiety, depression and other (comment) (Schizoaffective disorder)      Infectious " Disease:  - neg infectious disease ROS       Malignancy:      - no malignancy   Other: Comment: Chronic pain from neuropathy and PVD  Intrathecal pump with bupivacaine and fentanyl located in right lower back.   (+) No chance of pregnancy C-spine cleared: N/A, H/O Chronic Pain,H/O chronic opiod use , other significant disability Wheelchair bound         Temp: 98.3  F (36.8  C) Temp src: Oral BP: 174/73 Pulse: 80   Resp: 18 SpO2: 100 %         133 lbs 0 oz  Data Unavailable   Body mass index is 20.83 kg/(m^2).       Physical Exam  Constitutional: Awake, alert, cooperative, no apparent distress, and appears stated age.  Patient sitting comfortable in motorized wheelchair.  Eyes: Pupils equal, round and reactive to light, extra ocular muscles intact, sclera clear, conjunctiva normal.  HENT: Normocephalic, oral pharynx with moist mucus membranes, edentulous. No goiter appreciated.   Respiratory: Clear to auscultation bilaterally, no crackles or wheezing.  Cardiovascular: Regular rate and rhythm, normal S1 and S2, and no murmur noted.  Carotids +2, with bruits. AKA bilaterally.   GI: Normal bowel sounds, soft, non-distended, non-tender, no masses palpated, no hepatosplenomegaly.  Limited exam as sitting in WC.  Lymph/Hematologic: No cervical lymphadenopathy and no supraclavicular lymphadenopathy.  Genitourinary:  deferred  Skin: Warm and dry.  No rashes at anticipated surgical site.   Musculoskeletal: Full ROM of neck. There is no redness, warmth, or swelling of the joints. Gross motor strength is normal.    Neurologic: Awake, alert, oriented to name, place and time. Cranial nerves II-XII are grossly intact. Gait is normal.   Neuropsychiatric: Calm, cooperative. Normal affect.     Labs  Lab Results   Component Value Date    WBC 9.5 04/19/2017     Lab Results   Component Value Date    RBC 3.92 04/19/2017     Lab Results   Component Value Date    HGB 12.0 04/19/2017     Lab Results   Component Value Date    HCT 36.2  04/19/2017     Lab Results   Component Value Date    MCV 92 04/19/2017     Lab Results   Component Value Date    MCH 30.6 04/19/2017     Lab Results   Component Value Date    MCHC 33.1 04/19/2017     Lab Results   Component Value Date    RDW 13.3 04/19/2017     Lab Results   Component Value Date     04/19/2017       Last Basic Metabolic Panel:  Lab Results   Component Value Date     04/28/2017      Lab Results   Component Value Date    POTASSIUM 4.3 04/28/2017     Lab Results   Component Value Date    CHLORIDE 106 04/28/2017     Lab Results   Component Value Date    CAROL 9.1 04/28/2017     Lab Results   Component Value Date    CO2 28 04/28/2017     Lab Results   Component Value Date    BUN 14 04/28/2017     Lab Results   Component Value Date    CR 0.54 04/28/2017     Lab Results   Component Value Date    GLC 96 04/28/2017       Color Urine (no units)   Date Value   05/08/2017 Yellow     Appearance Urine (no units)   Date Value   05/08/2017 Clear     Glucose Urine (mg/dL)   Date Value   05/08/2017 Negative     Bilirubin Urine (no units)   Date Value   05/08/2017 Negative     Ketones Urine (mg/dL)   Date Value   05/08/2017 Negative     Specific Gravity Urine (no units)   Date Value   05/08/2017 1.015     pH Urine (pH)   Date Value   05/08/2017 7.0     Protein Albumin Urine (mg/dL)   Date Value   05/08/2017 Negative     Urobilinogen Urine (EU/dL)   Date Value   05/08/2017 0.2     Nitrite Urine (no units)   Date Value   05/08/2017 Negative     Leukocyte Esterase Urine (no units)   Date Value   05/08/2017 Negative       HgbA1C 5.0 9/2016     Procedures  12 lead ECG 4/19/2017:  SR 63 BPM    Bilateral carotid duplex Doppler ultrasound dated 3/13/2017 10:37 AM.  US CAROTID BILATERAL     Clinical history: 67-year-old male with a history of left carotid  stent.  Impression:     1. Right carotid: Echogenic plaque in the internal carotid artery  without focally elevated velocities, corresponding to less than  50%  stenosis of the right internal carotid artery by velocity criteria.     2. Left carotid stent: Patent left internal carotid artery stent a  without significant focally elevated velocities, with several areas of  echogenic plaque which shadow out the vessel lumen. Findings are  compatible with less than 50% stenosis by velocity criteria, however  the significant plaque shadowing makes accurate evaluation of  velocities difficult.     Echocardiogram 12/22/2016  Interpretation Summary  Left ventricular size is normal.  The Ejection Fraction is estimated at 35-40%.  Inferior wall akinesis is present.  Septal,basal to mid anterior wall akinesis.  Right ventricular function, chamber size, wall motion, and thickness are  normal.  Compared to prior study,the wallmotion abnormality is new.    Cardiac catheterization 9/17/2017  Diagnosis  1. 1-vessel coronary artery disease (RCA), without left main lesion.  2. Successful angioplasty (COLLEEN) of the proximal RCA.  3. Successful angioplasty (COLLEEN) of the mid RCA.    EGD 1/2017  Impression:          - Diffuse mucosal abnormality characterized by                        chronically-scarred mucosa from prior healed severe                        esophagitis was found in the lower third of the                        esophagus, notable pitting of mucosa with small                        pseudodiverticulum on this exam (likely from chronic                        severe erosive esophagitis in setting of longstanding                        multifactoral reflux).                        - White adherent nummular lesions were noted in the                        upper/middle third of the esophagus, noted without                        underlying erythema or esophageal inflammation. Possible                        food residue, cannot completely rule out candidal                        infection on this exam. Given report dysphagia +                        odynophagia in this patient with  multiple comorbidities,                        empiric antifungal treatment for esophageal candidiasis                        may need to be considered.                        - Esophagus otherwise normal on careful exam. Interval                        healing of prior erosive esophagitis.                        - Medium-sized hiatus hernia.                        - Localized area of moderately erythematous mucosa with                        oozing was found on the greater curvature of the                        stomach. Biopsied to rule out superimposed viral                        infection vs. medication effect (NSAIDs).                        - A few diminutive sessile polyps were found in the                        gastric body, appearance consistent with benign fundic                        gland polyps.                        - Stomach otherwise normal on careful exam.                        - Normal examined duodenum.       ASSESSMENT and PLAN  Sonya Foote is a 67 year old female scheduled to undergo Cystoscopy with Suprapubic Tube Placement with Dr. Connors on 6/8/2017 at [facility] under [facility].       Cardiology - RCRI : Coronary Artery Disease (MI, positive stress test, angina, Qs on EKG), Heart Failure & CVA; .  11 % risk of major adverse cardiac event. METS ~1.  Patient is WC bound >> is able to dress herself.       - CAD, s/p inferior MI with COLLEEN to pRCA and mRCA 9/17/2017.  On ASA and Plavix.  Followed by Dr. Anderson, cardiology.  Denies any cardiac symptoms. Contacted Yi Mathews from urology that cardiology's recommendations are for patient to remain on DAPT one year post COLLEEN placement.  They will be talking with cardiology for plan.         - HTN, take beta blocker DOS.  Hold ACE-I and diuretic DOS.  BP elevated at today's visit.  Recommend monitoring and f/u with PCP for further management.       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  Status post stent to left carotid.  Carotid US less than 50% stenosis in left and right carotid.       - Heart failure - EF 35-40% (12/2016) in the setting of sepsis from UTI.  Prior EF 55-60% on echo 9/2016 during hospitalization for MI, s/p PCI to RCA.       - PVD, s/p AKA left 6/2016 and right 11/2016  Pulmonary - remote smoking history, ~ 40 pack years       - JOSE, uses CPAP with O2 at night.  Will bring with DOS       - COPD/asthma, stable on scheduled inhalers.  Use inhalers DOS and bring to hospital  Hematology - h/o anemia, supplemental FeSO4. H/o blood transfusion with reaction 2012 \"hives\". Will get Type and Screen DOS.  Patient has been instructed to arrive early.       - Sickle Cell trait  GI - GERD with h/o gastritis, take PPI and Carafate DOS       - Dysphagia       - Esophageal stricture with h/o dilation       - Esophageal candidas, no evidence on last EGD 1/2017  Renal - Cr 0.5, GFR>90  Endocrine - NIDDM, diet managed.  HgbA1C 5.0 (9/2016)  Musculoskeletal - Recommend careful positioning given chronic pain.   Neuro - Seizure disorder, take Keppra and phenytoin.  Petit mal seizures daily. Grand Mal >15years ago.       - Neuropathy, take pregabalin DOS        - Depression/anxiety, take antidepressants DOS       - Schizoaffective disorder  HEENT - Legally blind.  Edentulous.  Limited neck ROM.   - plan for suprapubic tube placement for functional urinary incontinence.  Negative urinary culture on 5/8/2017.  Patient understands that she will need a UA one week prior to surgery per urologies orders.      She has the following specific operative considerations:   - Anesthesia considerations:  Refer to PAC assessment in anesthesia records  - VTE risk:  1.8%  - Risk of PONV score = 3.  If > 2, anti-emetic intervention recommended.  - Chronic pain >> intrathecal pump with bupivacaine and fentanyl.  Takes dilaudid for break through " "pain.  Please see pre-op pain consult for post-op pain management.    - Airway: records indicate \"easy\" intubation.    Arrival time, NPO, shower and medication instructions provided by nursing staff today.  Preparing For Your Surgery handout given.  Patient was discussed with Dr Willett. I spent 20 minutes face to face with patient, assessing, examining, and educating.      Reviewed and Signed by PAC Mid-Level Provider/Resident  Mid-Level Provider/Resident: Paty DOW CNP  Date: 5/19/2017  Time: 17:25      VICTOR M Phipps CNP  Preoperative Assessment Center  [facility]  [facility]  Phone: [phone]  Fax: [phone]  "

## 2017-05-22 ENCOUNTER — CARE COORDINATION (OUTPATIENT)
Dept: UROLOGY | Facility: CLINIC | Age: 67
End: 2017-05-22

## 2017-05-22 ENCOUNTER — ALLIED HEALTH/NURSE VISIT (OUTPATIENT)
Dept: PHARMACY | Facility: CLINIC | Age: 67
End: 2017-05-22
Payer: COMMERCIAL

## 2017-05-22 DIAGNOSIS — I50.22 CHRONIC SYSTOLIC CONGESTIVE HEART FAILURE (H): ICD-10-CM

## 2017-05-22 DIAGNOSIS — G47.01 INSOMNIA DUE TO MEDICAL CONDITION: ICD-10-CM

## 2017-05-22 DIAGNOSIS — J30.2 SEASONAL ALLERGIC RHINITIS, UNSPECIFIED ALLERGIC RHINITIS TRIGGER: ICD-10-CM

## 2017-05-22 DIAGNOSIS — K59.01 SLOW TRANSIT CONSTIPATION: ICD-10-CM

## 2017-05-22 DIAGNOSIS — R35.0 URINARY FREQUENCY: ICD-10-CM

## 2017-05-22 DIAGNOSIS — M62.838 MUSCLE SPASM: ICD-10-CM

## 2017-05-22 DIAGNOSIS — F32.A DEPRESSION, UNSPECIFIED DEPRESSION TYPE: ICD-10-CM

## 2017-05-22 DIAGNOSIS — K21.9 GASTROESOPHAGEAL REFLUX DISEASE WITHOUT ESOPHAGITIS: ICD-10-CM

## 2017-05-22 DIAGNOSIS — F06.4 ANXIETY DISORDER DUE TO GENERAL MEDICAL CONDITION: ICD-10-CM

## 2017-05-22 DIAGNOSIS — I10 ESSENTIAL HYPERTENSION WITH GOAL BLOOD PRESSURE LESS THAN 130/80: ICD-10-CM

## 2017-05-22 DIAGNOSIS — R11.0 NAUSEA: ICD-10-CM

## 2017-05-22 DIAGNOSIS — F25.9 SCHIZOAFFECTIVE DISORDER, UNSPECIFIED TYPE (H): ICD-10-CM

## 2017-05-22 DIAGNOSIS — I25.118 CORONARY ARTERY DISEASE OF NATIVE ARTERY OF NATIVE HEART WITH STABLE ANGINA PECTORIS (H): ICD-10-CM

## 2017-05-22 DIAGNOSIS — G25.81 RESTLESS LEGS SYNDROME (RLS): Primary | ICD-10-CM

## 2017-05-22 PROCEDURE — 99606 MTMS BY PHARM EST 15 MIN: CPT | Performed by: PHARMACIST

## 2017-05-22 PROCEDURE — 99607 MTMS BY PHARM ADDL 15 MIN: CPT | Performed by: PHARMACIST

## 2017-05-22 NOTE — PROGRESS NOTES
SUBJECTIVE/OBJECTIVE:                                                    Sonya Foote is a 67 year old female called for a follow-up visit for Medication Therapy Management.  She was referred to me from [facility].     Chief Complaint: follow-up from 4/26/17 MTM visit; significant polypharmacy.    Allergies/ADRs: Reviewed in Epic  Tobacco: History of tobacco dependence - quit several years ago   Alcohol: none  Activity: ambulates by wheelchair; both legs amputated  PMH: Reviewed in Epic    Medication Adherence: has assistance setting up med boxes    HFrEF/HTN/CAD: Current medications include HCTZ 12.5mg daily  metoprolol succinate 25mg daily  lisinopril 10mg once daily.  Also on ASA 81mg daily, Plavix 75mg daily, NTG prn. Patient reports the following potential medication side effects: orthostatic lightheadedness, nausea and excessive urination.     ECHO:  Date 12/22/2016, EF 35-40%  Denies SOB, chest pain, palpitations.  Pt is not measuring daily weights.   Patient has BP monitored by AL home.  These readings are not available to me today.    Pt is following a low sodium diet, is avoiding EtOH.  History of multiple CVAs and MIs per patient.  Stent placement in September 2016.      Depression/Anxiety/Schizoaffective/Insomnia:  Current medications include: Escitalopram 10mg once daily and Risperidone 0.5mg at bedtime, Trazodone 100mg at bedtime. Notes mood has been fine, and has tolerated reduction in Lexapro dose from 20mg to 10mg daily (dose was reduced on 4/26/17).    Constipation: Current medication includes Miralax 17gm daily.  Scheduled Docusate and prn Lactulose were dc'd following initial visit.  Denies problems with constipation & will have BM approx 3 times daily, and pt requesting that Miralax is dc'd.  Has been refusing it at AL due to frequent stooling.    GERD/nausea: Current medications include: Protonix (pantoprazole) 40mg once daily, Sucralfate 1gm qid.  Also on prn Zofran ODT and prn  Prochlorperazine. The patient does have a history of GI bleed, multiple hospitalizations related to bleed, history of ulcer.  Notes history of anemia, blood transfusion. Has stated previously that nausea has always been a significant problem for her.  Notes she continues with GERD symptoms at times.    RLS:  Current medication includes none.  Both legs amputated, and so Ropinirole dc'd following our initial visit because pt felt she did not need it & noted no RLS symptoms.  She was on Ropinirole 0.75mg nightly and tolerated taper/d'c.  Was also tapered off of Gabapentin due to Lyrica use.  However, pt now reports bothersome RLS symptoms that occur during daytime as well as night, and she would like to treat this.    Urinary frequency/incontinence:  Current medication includes: none.  Has been dc'd from the Tamsulosin.    Notes urinary frequency/incontinence; saw urology recently and plan is to have catheter placed on June 8th.    Allergic rhinitis: Current medications include cetirizine 10mg once daily prn and fluticasone nasal spray 1 spray(s) in each nostril once daily. Primary symptoms are runny nose and sneezing. Pt feels that current therapy is effective.  She did try using only the nasal spray, but noticed increased symptoms, so during allergy season prefers to take the Cetrizine daily as well.    Muscle spasms:  Current medications include none.  Prn Cyclobenzaprine ordered for her, but she states she hasn't received it yet due to PA requirement.  Notes spasm in upper back/neck.  Will apply heat & this is helpful, but only temporarily per pt.  Notes she has tried Icy Hot without much effect.    Current labs include:  BP Readings from Last 3 Encounters:   05/19/17 174/73   05/18/17 174/80   05/15/17 130/78     Today's Vitals: There were no vitals taken for this visit.  Lab Results   Component Value Date    A1C 4.1 01/24/2017   .  Lab Results   Component Value Date    CHOL 155 06/06/2016     Lab Results    Component Value Date    TRIG 62 06/06/2016     Lab Results   Component Value Date    HDL 65 06/06/2016     Lab Results   Component Value Date    LDL 77 06/06/2016       Liver Function Studies -   Recent Labs   Lab Test  01/24/17   1156   PROTTOTAL  7.4   ALBUMIN  3.0*   BILITOTAL  0.1*   ALKPHOS  162*   AST  29   ALT  35       Lab Results   Component Value Date    UCRR 54 02/05/2016    MICROL 6 02/05/2016    UMALCR 10.72 02/05/2016       Last Basic Metabolic Panel:  Lab Results   Component Value Date     04/28/2017      Lab Results   Component Value Date    POTASSIUM 4.3 04/28/2017     Lab Results   Component Value Date    CHLORIDE 106 04/28/2017     Lab Results   Component Value Date    BUN 14 04/28/2017     Lab Results   Component Value Date    CR 0.54 04/28/2017     GFR Estimate   Date Value Ref Range Status   04/28/2017 >90  Non  GFR Calc   >60 mL/min/1.7m2 Final   04/19/2017 >90  Non  GFR Calc   >60 mL/min/1.7m2 Final   01/24/2017 >90  Non  GFR Calc   >60 mL/min/1.7m2 Final     GFR Estimate If Black   Date Value Ref Range Status   04/28/2017 >90   GFR Calc   >60 mL/min/1.7m2 Final   04/19/2017 >90   GFR Calc   >60 mL/min/1.7m2 Final   01/24/2017 >90   GFR Calc   >60 mL/min/1.7m2 Final     TSH   Date Value Ref Range Status   11/04/2016 0.84 0.40 - 4.00 mU/L Final   ]    Most Recent Immunizations   Administered Date(s) Administered     Influenza (High Dose) 3 valent vaccine 09/03/2016     Influenza (IIV3) 09/01/2013     Pneumococcal (PCV 13) 10/22/2015     Pneumococcal 23 valent 02/22/2014     TD (ADULT, 7+) 01/01/2011       ASSESSMENT:                                                       Current medications were reviewed today.     Medication Adherence: no issues identified      HFrEF/HTN/CAD: Stable. Patient is typically meeting BP goal of < 140/90mmHg.  Pt is on appropriate ACE/ARB and beta blocker.       Depression/Anxiety/Schizoaffective/Insomnia: Stable.  Has tolerated reduction in Lexapro.    Constipation: Pt requesting Miralax be dc'd due to frequent BMs.    GERD/nausea: Stable.      RLS:  Pt requesting something for symptoms.  Could consider increase in Lyrica dose vs adding Ropinirole to avoid adding another drug.  Pt agreeable to trying increase in Lyrica.  If this isn't effective, could consider in future switching from Lyrica to Gabapentin and titrate as necessary.     Urinary frequency/incontinence:  Stable.  Will be having catheter placed June 8th, per pt.    Allergic rhinitis: Stable.    Muscle spasms:  Discussed risks of Cylcobenzaprine use; high risk med in elderly - highly anticholinergic and increases risk of confusion, CNS and respiratory depression, falls, dizziness, etc., especially in combo with narcotics; best to avoid.  Should not be used in combo with narcotics. It is possible that an increase in Lyrica may be helpful for her neck pain/muscle spasms.  If not, could instead consider prn Tizanidine or Baclofen (vs Cyclobenzaprine) which may be safer options for her.  Another option as noted above, would be possible switch from Lyrica to Gabapentin which can also be helpful for neck pain.          PLAN:                                                      1.  MD may consider d'c of Miralax per patient's request.    2.  MD may consider increase in Lyrica dose and monitor for improvement of RLS symptoms.  This may also potentially help with neck pain/spasms.    3.  If increase in Lyrica not helpful, MD may consider use of Tizanidine 2mg at bedtime as needed for spasticity or Baclofen 5mg nightly as needed (instead of Cyclobenzaprine).        I spent 15 minutes with this patient today. A copy of the visit note was provided to the patient's primary care provider.    Will follow up in 1 month over the phone, sooner if necessary.    The patient was mailed a summary of these recommendations as an  after visit summary.     Elizabeth Rose, Pharm.D.,[facility]  Board Certified Geriatric Pharmacist  Medication Therapy Management Pharmacist  [phone]

## 2017-05-22 NOTE — Clinical Note
Hi again , Sorry to keep bugging you on Sonya.  See note - are you ok with increasing Lyrica to see if that helps with her RLS and possibly her neck pain?  Trying to avoid addition of more meds.  I'm wondering if the Gabapentin had been helping with her RLS and neck pain (although it was duplicate tx with the lyrica) since she was taking it a few times daily and the ropinirole was only at night, and notes daytime as well as night time symptoms.  we could always consider switching from lyrica to gabapentin, too.  Thanks!

## 2017-05-22 NOTE — PROGRESS NOTES
5/22/17: CM received quote back from [employer], OT, installation not needed.  CM to updated CPS and auth sent to [employer] - once they receive they will deliver.   CPS and POC updated.  Member is aware.     Jamie Sharma RN, N  [employer]

## 2017-05-22 NOTE — MR AVS SNAPSHOT
After Visit Summary   5/22/2017    Sonya Foote    MRN: [id number]           Patient Information     Date Of Birth          1950        Visit Information        Provider Department      5/22/2017 2:00 PM Elizabeth Rose [facility] MTM        Today's Diagnoses     Restless legs syndrome (RLS)    -  1    Chronic systolic congestive heart failure (H)        Essential hypertension with goal blood pressure less than 130/80        Anxiety disorder due to general medical condition        Coronary artery disease of native artery of native heart with stable angina pectoris (H)        Schizoaffective disorder, unspecified type (H)        Muscle spasm        Seasonal allergic rhinitis, unspecified allergic rhinitis trigger        Urinary frequency        Gastroesophageal reflux disease without esophagitis        Nausea        Slow transit constipation        Insomnia due to medical condition        Depression, unspecified depression type          Care Instructions    Recommendations from today's MTM visit:                                                        1.  MD may consider discontinuation of Miralax per your request.    2.  MD may consider increase in Lyrica dose and monitor for improvement of restless leg symptoms.  This may also potentially help with neck pain/spasms.    3.  If increase in Lyrica not helpful, MD may consider use of Tizanidine 2mg at bedtime as needed for spasticity or Baclofen 5mg nightly as needed (instead of Cyclobenzaprine).  Cyclobenzaprine increases risk of confusion, delirium, respiratory and CNS depression, constipation, blurred vision, dry mouth, etc.    Next MTM visit: one month over the phone, sooner if necessary    To schedule another MTM appointment, please call me directly or you may call the MTM scheduling line at [phone] or toll-free at [phone].     My Clinical Pharmacist's contact information:                                                       It was a pleasure talking to you today!  Please feel free to contact me with any questions or concerns you have.      Elizabeth Rose, Pharm.D.,[facility]  Board Certified Geriatric Pharmacist  Medication Therapy Management Pharmacist  [phone]      You may receive a survey about the MTM services you received.  I would appreciate your feedback to help me serve you better in the future. Please fill it out and return it when you can. Your comments will be anonymous.                      Follow-ups after your visit        Your next 10 appointments already scheduled     Jun 13, 2017  2:00 PM CDT   Return Sleep Patient with Guanaco Kowalski MD   [facility] ([facility])    [address]  [facility] [id number] [phone]            Jun 28, 2017 12:30 PM CDT   Return Visit with Elizabeth Oliver MD   [facility] ([facility])    [address] [place] [address]  [address]  [facility] [address] [phone]            Aug 28, 2017  1:30 PM CDT   (Arrive by 1:15 PM)   Return Visit with Alina Jennings MD   [facility] ([facility])    [address] [place] [address]   [phone]            Sep 08, 2017 10:00 AM CDT   (Arrive by 9:45 AM)   New Patient Visit with VICTOR M Floyd [facility] ([facility])    [address] [place] [address]   [phone]            Nov 10, 2017  9:20 AM CST   (Arrive by 9:05 AM)   RETURN DIABETES with Michelle Irizarry MD   [facility] ([facility])    [address] [place] [address] [phone]              Future tests that were ordered for you today     Open Standing Orders        Priority Remaining Interval Expires  "Ordered    Activity: Up with assist (bilateral AKA) Routine 92265/29950 PRN  2017            Who to contact     If you have questions or need follow up information about today's clinic visit or your schedule please contact [facility] directly at [phone].  Normal or non-critical lab and imaging results will be communicated to you by MyChart, letter or phone within 4 business days after the clinic has received the results. If you do not hear from us within 7 days, please contact the clinic through [facility] or phone. If you have a critical or abnormal lab result, we will notify you by phone as soon as possible.  Submit refill requests through Seer or call your pharmacy and they will forward the refill request to us. Please allow 3 business days for your refill to be completed.          Additional Information About Your Visit        MyChart Information     Seer lets you send messages to your doctor, view your test results, renew your prescriptions, schedule appointments and more. To sign up, go to [email or web address] . Click on \"Log in\" on the left side of the screen, which will take you to the Welcome page. Then click on \"Sign up Now\" on the right side of the page.     You will be asked to enter the access code listed below, as well as some personal information. Please follow the directions to create your username and password.     Your access code is: [email or web address]  Expires: 2017 11:52 AM     Your access code will  in 90 days. If you need help or a new code, please call your [place] clinic or [phone].        Care EveryWhere ID     This is your Care EveryWhere ID. This could be used by other organizations to access your [place] medical records  [id number]         Blood Pressure from Last 3 Encounters:   17 137/62   17 174/73   17 174/80    Weight from Last 3 Encounters:   17 133 lb (60.3 kg)   17 133 lb (60.3 kg)   05/10/17 133 lb " (60.3 kg)              Today, you had the following     No orders found for display         Today's Medication Changes          These changes are accurate as of: 5/22/17 11:59 PM.  If you have any questions, ask your nurse or doctor.               These medicines have changed or have updated prescriptions.        Dose/Directions    aspirin 81 MG EC tablet   This may have changed:  when to take this   Used for:  Unstable angina (H)        Dose:  81 mg   Take 1 tablet (81 mg) by mouth daily   Quantity:  90 tablet   Refills:  3       atorvastatin 40 MG tablet   Commonly known as:  LIPITOR   This may have changed:  when to take this   Used for:  Hyperlipidemia LDL goal <100        Dose:  40 mg   Take 1 tablet (40 mg) by mouth daily   Quantity:  90 tablet   Refills:  3       calcium citrate-vitamin D 315-250 MG-UNIT Tabs per tablet   Commonly known as:  CITRACAL   This may have changed:  when to take this   Used for:  Osteoporosis        Dose:  2 tablet   Take 2 tablets by mouth daily   Quantity:  120 tablet   Refills:  5       clopidogrel 75 MG tablet   Commonly known as:  PLAVIX   This may have changed:  when to take this   Used for:  PAD (peripheral artery disease) (H), UGI bleed, Osteoporosis, Nausea        Dose:  75 mg   Take 1 tablet (75 mg) by mouth daily   Quantity:  90 tablet   Refills:  3       ondansetron 4 MG ODT tab   Commonly known as:  ZOFRAN-ODT   This may have changed:    - when to take this  - reasons to take this   Used for:  Intractable vomiting with nausea, unspecified vomiting type        Dose:  4 mg   Take 1 tablet (4 mg) by mouth every 6 hours   Quantity:  120 tablet   Refills:  0       Phenytoin Sodium Extended 200 MG Caps   This may have changed:  additional instructions   Used for:  Seizure disorder (H)        Dose:  200 mg   Take 200 mg by mouth 2 times daily   Quantity:  60 capsule   Refills:  0                Primary Care Provider Office Phone # Fax #    Allan Casey [phone]  [phone]       [facility] [address] [facility] [address]        Thank you!     Thank you for choosing [facility]  for your care. Our goal is always to provide you with excellent care. Hearing back from our patients is one way we can continue to improve our services. Please take a few minutes to complete the written survey that you may receive in the mail after your visit with us. Thank you!             Your Updated Medication List - Protect others around you: Learn how to safely use, store and throw away your medicines at www.disposemymeds.org.          This list is accurate as of: 5/22/17 11:59 PM.  Always use your most recent med list.                   Brand Name Dispense Instructions for use    ADVAIR DISKUS 250-50 MCG/DOSE diskus inhaler   Generic drug:  fluticasone-salmeterol      Inhale 1 puff into the lungs 2 times daily       albuterol (2.5 MG/3ML) 0.083% neb solution     360 mL    INHALE 1 VIAL VIA NEBULIZER EVERY 6 HOURS AS NEEDED       aspirin 81 MG EC tablet     90 tablet    Take 1 tablet (81 mg) by mouth daily       atorvastatin 40 MG tablet    LIPITOR    90 tablet    Take 1 tablet (40 mg) by mouth daily       blood glucose monitoring meter device kit     1 kit    Use to test blood sugars 3 times daily or as directed.       blood glucose monitoring test strip    ONE TOUCH ULTRA    3 Box    Use to test blood sugars 3 times daily or as directed.       calcium citrate-vitamin D 315-250 MG-UNIT Tabs per tablet    CITRACAL    120 tablet    Take 2 tablets by mouth daily       cetirizine 10 MG tablet    zyrTEC    90 tablet    Take 1 tablet (10 mg) by mouth daily as needed for allergies       clopidogrel 75 MG tablet    PLAVIX    90 tablet    Take 1 tablet (75 mg) by mouth daily       CYANOCOBALAMIN PO      Take 2,000 mcg by mouth daily       diclofenac 1 % Gel topical gel    VOLTAREN    100 g    Apply 2 g topically 4 times daily to hands       dorzolamide 2 %  ophthalmic solution    TRUSOPT     Place 1 drop into both eyes 2 times daily       * EASY TOUCH LANCETS 30G/TWIST Misc          * thin lancets    NO BRAND SPECIFIED    1 Box    Use to test blood sugar 3 times daily or as directed.       * blood glucose monitoring lancets     3 Box    Use to test blood sugar 3 times daily or as directed.       EPINEPHrine 0.15 MG/0.3ML injection    EPIPEN JR    0.6 mL    Inject 0.3 mLs (0.15 mg) into the muscle as needed for anaphylaxis       estradiol 1 MG tablet    ESTRACE    90 tablet    Take 1 tablet (1 mg) by mouth daily       ferrous sulfate 325 (65 FE) MG tablet    IRON    60 tablet    Take 1 tablet (325 mg) by mouth 2 times daily With meals       fluticasone 50 MCG/ACT spray    FLONASE     Spray 1 spray into both nostrils daily       hydrochlorothiazide 12.5 MG capsule    MICROZIDE    90 capsule    Take 1 capsule (12.5 mg) by mouth daily       HYDROmorphone 2 MG tablet    DILAUDID    30 tablet    Take 1 tablet (2 mg) by mouth every 3 hours as needed for moderate to severe pain       RENÉE Pack      Take 1 packet by mouth 2 times daily       latanoprost 0.005 % ophthalmic solution    XALATAN    2.5 mL    Place 1 drop into both eyes At Bedtime       levETIRAcetam 500 MG tablet    KEPPRA    180 tablet    Take 1 tablet (500 mg) by mouth 2 times daily       lisinopril 10 MG tablet    PRINIVIL/ZESTRIL    90 tablet    Take 1 tablet (10 mg) by mouth daily       LYRICA 100 MG capsule   Generic drug:  pregabalin      Take 100 mg by mouth 2 times daily       MEDICATION GIVEN BY INTRATHECAL PUMP - INSTRUCTION      continuous May 19, 2017 - per [facility] [phone]: Conc: Bupivacaine 20 mg/mL and Fentanyl 2000 mcg/mL. Continuous: Bupivacaine 7.265 mg/day and Fentanyl 726.5 mcg/day. Boluses: Up to 7 boluses per 24-hr period of Bupivicaine 0.599 mg and Fentanyl 59.9 mcg  Pump Refill Date at [facility]: 7/2/17       metoprolol 25 MG 24 hr  tablet    TOPROL-XL    30 tablet    Take 1 tablet (25 mg) by mouth daily       MULTIVITAMIN PO      Take 1 tablet by mouth daily       nitroglycerin 0.4 MG sublingual tablet    NITROSTAT    25 tablet    Place 1 tablet (0.4 mg) under the tongue every 5 minutes as needed for chest pain if you are still having symptoms after 3 doses (15 minutes) call 911.       ondansetron 4 MG ODT tab    ZOFRAN-ODT    120 tablet    Take 1 tablet (4 mg) by mouth every 6 hours       * order for DME     1 Month    Equipment being ordered: Depends briefs       * order for DME     1 Device    Equipment being ordered: Power Wheelchair       * order for DME     1 Units    Equipment being ordered: Nebulizer and supplies       * order for DME     1 Box    Equipment being ordered: Glucerna daily shakes with each meal       * order for DME     1 Device    ACcu check BID       * order for DME     1 Units    Equipment being ordered: Nebulizer and tubing supplies       * order for DME     1 Device    Equipment being ordered: CPAP supplies mask, hose, filters, cushion fax to [facility] at [phone] Disp: 10 DeviceRefills: prn Class: Local PrintStart: 2/10/2017       * order for DME     10 Device    Equipment being ordered: CPAP supplies mask, hose, filters, cushion  fax to [facility] at [phone]       * order for DME     1 Device    Equipment being ordered: wheelchair seat cushion       pantoprazole 40 MG EC tablet    PROTONIX     Take 40 mg by mouth daily       Phenytoin Sodium Extended 200 MG Caps     60 capsule    Take 200 mg by mouth 2 times daily       prochlorperazine 10 MG tablet    COMPAZINE    20 tablet    Take 1 tablet (10 mg) by mouth every 8 hours as needed       risperiDONE 0.5 MG tablet    risperDAL     Take 0.5 mg by mouth At Bedtime       SPIRIVA HANDIHALER 18 MCG capsule   Generic drug:  tiotropium     30 capsule    INHALE THE CONTENTS OF 1 CAPSULE VIA INHALATION DEVICE DAILY       sucralfate 1 GM tablet    CARAFATE     40 tablet    Take 1 tablet (1 g) by mouth 4 times daily May dissolve in 10 mL water is necessary. (Start upon completion of carafate suspension)       traZODone 100 MG tablet    DESYREL    90 tablet    Take 1 tablet (100 mg) by mouth At Bedtime       vitamin D 2000 UNITS tablet     100 tablet    Take 2,000 Units by mouth daily.       zinc 50 MG Tabs      Take 1 tablet by mouth daily       * Notice:  This list has 12 medication(s) that are the same as other medications prescribed for you. Read the directions carefully, and ask your doctor or other care provider to review them with you.

## 2017-05-22 NOTE — PROGRESS NOTES
5/19/17: Received email back from ULISES Shine with [facility] stating which toilet safety frame is correct.  Fátima is done seeing member but states PT will f/u once toilet safety frame delivered.    ELVIRA sent email request to Nalini at [facility] to order.     Jamie Sharma RN, PHN  [facility]

## 2017-05-22 NOTE — PROGRESS NOTES
Notified patient we had to cancel the surgery for tube placement due patient is unable to stop her plavix and aspirin.  Patient says she will be able to stop in 5 months. Pac confirmed surgery should be canceled too  Patient verbalized understanding  Will notify Dr Benito Mathews, RN   Care Coordinator Urology

## 2017-05-23 NOTE — PATIENT INSTRUCTIONS
Recommendations from today's MTM visit:                                                        1.  MD may consider discontinuation of Miralax per your request.    2.  MD may consider increase in Lyrica dose and monitor for improvement of restless leg symptoms.  This may also potentially help with neck pain/spasms.    3.  If increase in Lyrica not helpful, MD may consider use of Tizanidine 2mg at bedtime as needed for spasticity or Baclofen 5mg nightly as needed (instead of Cyclobenzaprine).  Cyclobenzaprine increases risk of confusion, delirium, respiratory and CNS depression, constipation, blurred vision, dry mouth, etc.    Next MTM visit: one month over the phone, sooner if necessary    To schedule another MTM appointment, please call me directly or you may call the MTM scheduling line at [phone] or toll-free at [phone].     My Clinical Pharmacist's contact information:                                                      It was a pleasure talking to you today!  Please feel free to contact me with any questions or concerns you have.      Elizabeth Rose, Pharm.D.,[facility]  Board Certified Geriatric Pharmacist  Medication Therapy Management Pharmacist  [phone]      You may receive a survey about the MTM services you received.  I would appreciate your feedback to help me serve you better in the future. Please fill it out and return it when you can. Your comments will be anonymous.

## 2017-05-24 ENCOUNTER — HOSPITAL ENCOUNTER (OUTPATIENT)
Dept: CT IMAGING | Facility: CLINIC | Age: 67
Discharge: HOME | End: 2017-05-24
Attending: INTERNAL MEDICINE | Admitting: INTERNAL MEDICINE
Payer: COMMERCIAL

## 2017-05-24 ENCOUNTER — TELEPHONE (OUTPATIENT)
Dept: UROLOGY | Facility: CLINIC | Age: 67
End: 2017-05-24

## 2017-05-24 DIAGNOSIS — M54.2 CERVICALGIA: ICD-10-CM

## 2017-05-24 PROCEDURE — 72125 CT NECK SPINE W/O DYE: CPT

## 2017-05-24 NOTE — TELEPHONE ENCOUNTER
Pt calling and wondering if she can get a reg harrell cath until she can have surgery for her SP cath.  She said she is due to have SP tube surgery on 6-8-17.  Can she get a harrell until she has surgery in June to get her SP tube?  Pt can be reached at [phone]  Thank you.  KATHY Graff, CMA

## 2017-05-25 ENCOUNTER — TELEPHONE (OUTPATIENT)
Dept: PHARMACY | Facility: CLINIC | Age: 67
End: 2017-05-25

## 2017-05-25 DIAGNOSIS — M79.601 PAIN IN BOTH UPPER EXTREMITIES: Primary | ICD-10-CM

## 2017-05-25 DIAGNOSIS — M79.602 PAIN IN BOTH UPPER EXTREMITIES: Primary | ICD-10-CM

## 2017-05-25 DIAGNOSIS — Z53.9 ERRONEOUS ENCOUNTER--DISREGARD: Primary | ICD-10-CM

## 2017-05-25 RX ORDER — PREGABALIN 75 MG/1
150 CAPSULE ORAL 2 TIMES DAILY
Qty: 120 CAPSULE | Refills: 5 | Status: SHIPPED | OUTPATIENT
Start: 2017-05-25 | End: 2018-02-14

## 2017-05-25 NOTE — TELEPHONE ENCOUNTER
Prior authorization    Medication name cyclobenzaprine  Insurance medica  Insurance ID number [id number]  Prior authorization faxed through cover my meds

## 2017-05-25 NOTE — TELEPHONE ENCOUNTER
----- Message from Elizabeth Rose RP sent at 5/25/2017  9:11 AM CDT -----  Regarding: RE: Lyrica increase?  Thanks.  Yes - I did calculate a CrCl taking into account both above knee ampuations, and dose would be fine. I can put in a telephone encounter and pend the order for you to sign( need to do it that way since controlled substance). Thanks again!  I will call Sonya and notify AL of change. I appreciate your help!    ----- Message -----     From: Allan Casey MD     Sent: 5/25/2017   8:02 AM       To: Elizabeth Rose RPH  Subject: RE: Lyrica increase?                             That is fine as long a renal function supports dose.  ----- Message -----     From: Elizabeth Rose [facility]     Sent: 5/25/2017   7:51 AM       To: Allan Casey MD  Subject: Lyrica increase?                                 Hi Dr.Nielsen- Hassan called me yesterday wondering about the Lyrica. Are you ok with increasing it to 150mg bid, with hopes of it helping with RLS as well as neck pain?  If so, I think b/c it's Lyrica that you may need to place the order vs me.  I can notify Sonya and the AL though.  Let me know if ok to increase and if you can place that order with the pharmacy.  Thanks again so much.  Elizabeth

## 2017-05-30 ENCOUNTER — TELEPHONE (OUTPATIENT)
Dept: INTERNAL MEDICINE | Facility: CLINIC | Age: 67
End: 2017-05-30

## 2017-05-30 NOTE — TELEPHONE ENCOUNTER
Sonya Foote is a 67 year old female who group home calls with cough, fever .    NURSING ASSESSMENT:  Description:  Group home reports that pt has a fever of 100.0, cough with clear sputum and body aches.  Onset/duration:  Started today  Precip. factors:  none  Associated symptoms:  none  Improves/worsens symptoms:  none  Pain scale (0-10)   4/10  LMP/preg/breast feeding:  NA  Last exam/Treatment:  5/15/17  Allergies:   Allergies   Allergen Reactions     Bee Venom      Penicillins Anaphylaxis     Other reaction(s): Skin Rash and/or Hives     Dilantin [Phenytoin] Other (See Comments)     Generic dilantin only per pt     Iodide Hives     09/11/15: Pt states she can use iodine and doesn't have any problems      Iodine-131      Novocaine [Procaine] Hives     Other reaction(s): Skin Rash and/or Hives       MEDICATIONS:     NURSING PLAN: Routed to provider Yes, group home is asking for an order for Tylenol ES, stats that pt does not have this on file.  If okay can call group home with order.      If further questions/concerns or if symptoms do not improve, worsen or new symptoms develop, call your PCP or [facility] as soon as possible.      Guideline used:  Telephone Triage Protocols for Nurses, Fifth Edition, Paulina Luciano, EDWARDO     Can also fax order to group home, fax is [phone]

## 2017-05-30 NOTE — TELEPHONE ENCOUNTER
Fax received from "[facility] pharmacy stating that Spiriva w/ HandiHaler is no longer covered by patients formulary. Formulary alternative Incruse ellipta. Please prescribe alternative or forward for PA.

## 2017-05-30 NOTE — TELEPHONE ENCOUNTER
Spoke with nurse at group home and gave verbal orders per Dr. Casey for Tylenol ES as needed every 4 to 6 hours for fever.  Nurse understood and will call back if further questions or concerns.

## 2017-05-31 ENCOUNTER — TELEPHONE (OUTPATIENT)
Dept: NURSING | Facility: CLINIC | Age: 67
End: 2017-05-31

## 2017-05-31 ENCOUNTER — CARE COORDINATION (OUTPATIENT)
Dept: GERIATRIC MEDICINE | Facility: CLINIC | Age: 67
End: 2017-05-31

## 2017-05-31 ENCOUNTER — HOSPITAL ENCOUNTER (INPATIENT)
Facility: CLINIC | Age: 67
LOS: 2 days | Discharge: HOME HEALTH | DRG: 872 | End: 2017-06-02
Attending: EMERGENCY MEDICINE | Admitting: INTERNAL MEDICINE
Payer: COMMERCIAL

## 2017-05-31 ENCOUNTER — APPOINTMENT (OUTPATIENT)
Dept: GENERAL RADIOLOGY | Facility: CLINIC | Age: 67
DRG: 872 | End: 2017-05-31
Payer: COMMERCIAL

## 2017-05-31 DIAGNOSIS — Z89.511 STATUS POST BELOW KNEE AMPUTATION OF RIGHT LOWER EXTREMITY (H): ICD-10-CM

## 2017-05-31 DIAGNOSIS — R53.1 WEAKNESS GENERALIZED: ICD-10-CM

## 2017-05-31 DIAGNOSIS — R53.83 FATIGUE, UNSPECIFIED TYPE: ICD-10-CM

## 2017-05-31 DIAGNOSIS — R63.0 POOR APPETITE: ICD-10-CM

## 2017-05-31 DIAGNOSIS — R50.9 FEVER, UNSPECIFIED: ICD-10-CM

## 2017-05-31 DIAGNOSIS — I25.118 CORONARY ARTERY DISEASE OF NATIVE ARTERY OF NATIVE HEART WITH STABLE ANGINA PECTORIS (H): Primary | ICD-10-CM

## 2017-05-31 DIAGNOSIS — E11.9 DIABETES MELLITUS TYPE 2 WITHOUT RETINOPATHY (H): ICD-10-CM

## 2017-05-31 DIAGNOSIS — N10 ACUTE PYELONEPHRITIS: ICD-10-CM

## 2017-05-31 LAB
ALBUMIN UR-MCNC: 10 MG/DL
ANION GAP SERPL CALCULATED.3IONS-SCNC: 10 MMOL/L (ref 3–14)
APPEARANCE UR: ABNORMAL
BACTERIA #/AREA URNS HPF: ABNORMAL /HPF
BASOPHILS # BLD AUTO: 0 10E9/L (ref 0–0.2)
BASOPHILS NFR BLD AUTO: 0.1 %
BILIRUB UR QL STRIP: NEGATIVE
BUN SERPL-MCNC: 14 MG/DL (ref 7–30)
CALCIUM SERPL-MCNC: 8.7 MG/DL (ref 8.5–10.1)
CHLORIDE SERPL-SCNC: 106 MMOL/L (ref 94–109)
CO2 SERPL-SCNC: 21 MMOL/L (ref 20–32)
COLOR UR AUTO: YELLOW
CREAT SERPL-MCNC: 0.75 MG/DL (ref 0.52–1.04)
DIFFERENTIAL METHOD BLD: ABNORMAL
EOSINOPHIL # BLD AUTO: 0 10E9/L (ref 0–0.7)
EOSINOPHIL NFR BLD AUTO: 0 %
ERYTHROCYTE [DISTWIDTH] IN BLOOD BY AUTOMATED COUNT: 14.7 % (ref 10–15)
GFR SERPL CREATININE-BSD FRML MDRD: 77 ML/MIN/1.7M2
GLUCOSE BLDC GLUCOMTR-MCNC: 101 MG/DL (ref 70–99)
GLUCOSE SERPL-MCNC: 102 MG/DL (ref 70–99)
GLUCOSE UR STRIP-MCNC: NEGATIVE MG/DL
HCT VFR BLD AUTO: 33.4 % (ref 35–47)
HGB BLD-MCNC: 11 G/DL (ref 11.7–15.7)
HGB UR QL STRIP: ABNORMAL
IMM GRANULOCYTES # BLD: 0.1 10E9/L (ref 0–0.4)
IMM GRANULOCYTES NFR BLD: 0.2 %
INR PPP: 1.16 (ref 0.86–1.14)
KETONES UR STRIP-MCNC: NEGATIVE MG/DL
LACTATE BLD-SCNC: 0.6 MMOL/L (ref 0.7–2.1)
LEUKOCYTE ESTERASE UR QL STRIP: ABNORMAL
LYMPHOCYTES # BLD AUTO: 3.4 10E9/L (ref 0.8–5.3)
LYMPHOCYTES NFR BLD AUTO: 15.5 %
MCH RBC QN AUTO: 30.1 PG (ref 26.5–33)
MCHC RBC AUTO-ENTMCNC: 32.9 G/DL (ref 31.5–36.5)
MCV RBC AUTO: 91 FL (ref 78–100)
MONOCYTES # BLD AUTO: 2.5 10E9/L (ref 0–1.3)
MONOCYTES NFR BLD AUTO: 11.4 %
MUCOUS THREADS #/AREA URNS LPF: PRESENT /LPF
NEUTROPHILS # BLD AUTO: 15.8 10E9/L (ref 1.6–8.3)
NEUTROPHILS NFR BLD AUTO: 72.8 %
NITRATE UR QL: NEGATIVE
NRBC # BLD AUTO: 0 10*3/UL
NRBC BLD AUTO-RTO: 0 /100
PH UR STRIP: 5.5 PH (ref 5–7)
PLATELET # BLD AUTO: 209 10E9/L (ref 150–450)
PLATELET # BLD EST: ABNORMAL 10*3/UL
POTASSIUM SERPL-SCNC: 3.3 MMOL/L (ref 3.4–5.3)
RBC # BLD AUTO: 3.66 10E12/L (ref 3.8–5.2)
RBC #/AREA URNS AUTO: 2 /HPF (ref 0–2)
SODIUM SERPL-SCNC: 137 MMOL/L (ref 133–144)
SP GR UR STRIP: 1.01 (ref 1–1.03)
SQUAMOUS #/AREA URNS AUTO: <1 /HPF (ref 0–1)
TRANS CELLS #/AREA URNS HPF: <1 /HPF (ref 0–1)
URN SPEC COLLECT METH UR: ABNORMAL
UROBILINOGEN UR STRIP-MCNC: NORMAL MG/DL (ref 0–2)
WBC # BLD AUTO: 21.7 10E9/L (ref 4–11)
WBC #/AREA URNS AUTO: 41 /HPF (ref 0–2)

## 2017-05-31 PROCEDURE — 25000125 ZZHC RX 250: Performed by: INTERNAL MEDICINE

## 2017-05-31 PROCEDURE — 40000141 ZZH STATISTIC PERIPHERAL IV START W/O US GUIDANCE

## 2017-05-31 PROCEDURE — 25000132 ZZH RX MED GY IP 250 OP 250 PS 637: Performed by: INTERNAL MEDICINE

## 2017-05-31 PROCEDURE — 87086 URINE CULTURE/COLONY COUNT: CPT | Performed by: FAMILY MEDICINE

## 2017-05-31 PROCEDURE — 96366 THER/PROPH/DIAG IV INF ADDON: CPT | Performed by: EMERGENCY MEDICINE

## 2017-05-31 PROCEDURE — 36415 COLL VENOUS BLD VENIPUNCTURE: CPT | Performed by: EMERGENCY MEDICINE

## 2017-05-31 PROCEDURE — 96361 HYDRATE IV INFUSION ADD-ON: CPT | Performed by: EMERGENCY MEDICINE

## 2017-05-31 PROCEDURE — 99223 1ST HOSP IP/OBS HIGH 75: CPT | Mod: AI | Performed by: INTERNAL MEDICINE

## 2017-05-31 PROCEDURE — 85025 COMPLETE CBC W/AUTO DIFF WBC: CPT | Performed by: EMERGENCY MEDICINE

## 2017-05-31 PROCEDURE — S5010 5% DEXTROSE AND 0.45% SALINE: HCPCS | Performed by: FAMILY MEDICINE

## 2017-05-31 PROCEDURE — 99285 EMERGENCY DEPT VISIT HI MDM: CPT | Mod: Z6 | Performed by: EMERGENCY MEDICINE

## 2017-05-31 PROCEDURE — 25800025 ZZH RX 258: Performed by: FAMILY MEDICINE

## 2017-05-31 PROCEDURE — 25000128 H RX IP 250 OP 636: Performed by: FAMILY MEDICINE

## 2017-05-31 PROCEDURE — 99285 EMERGENCY DEPT VISIT HI MDM: CPT | Mod: 25 | Performed by: EMERGENCY MEDICINE

## 2017-05-31 PROCEDURE — 81001 URINALYSIS AUTO W/SCOPE: CPT | Performed by: EMERGENCY MEDICINE

## 2017-05-31 PROCEDURE — 85025 COMPLETE CBC W/AUTO DIFF WBC: CPT | Performed by: FAMILY MEDICINE

## 2017-05-31 PROCEDURE — 87088 URINE BACTERIA CULTURE: CPT | Performed by: FAMILY MEDICINE

## 2017-05-31 PROCEDURE — 96365 THER/PROPH/DIAG IV INF INIT: CPT | Performed by: EMERGENCY MEDICINE

## 2017-05-31 PROCEDURE — 12000001 ZZH R&B MED SURG/OB UMMC

## 2017-05-31 PROCEDURE — 71010 XR CHEST 1 VW: CPT

## 2017-05-31 PROCEDURE — 83605 ASSAY OF LACTIC ACID: CPT | Performed by: EMERGENCY MEDICINE

## 2017-05-31 PROCEDURE — 87040 BLOOD CULTURE FOR BACTERIA: CPT | Performed by: EMERGENCY MEDICINE

## 2017-05-31 PROCEDURE — 87040 BLOOD CULTURE FOR BACTERIA: CPT | Performed by: FAMILY MEDICINE

## 2017-05-31 PROCEDURE — 80048 BASIC METABOLIC PNL TOTAL CA: CPT | Performed by: EMERGENCY MEDICINE

## 2017-05-31 PROCEDURE — 87186 SC STD MICRODIL/AGAR DIL: CPT | Performed by: FAMILY MEDICINE

## 2017-05-31 PROCEDURE — 85610 PROTHROMBIN TIME: CPT | Performed by: EMERGENCY MEDICINE

## 2017-05-31 PROCEDURE — 00000146 ZZHCL STATISTIC GLUCOSE BY METER IP

## 2017-05-31 RX ORDER — NITROGLYCERIN 0.4 MG/1
0.4 TABLET SUBLINGUAL EVERY 5 MIN PRN
Status: DISCONTINUED | OUTPATIENT
Start: 2017-05-31 | End: 2017-06-02 | Stop reason: HOSPADM

## 2017-05-31 RX ORDER — PREGABALIN 75 MG/1
150 CAPSULE ORAL 2 TIMES DAILY
Status: DISCONTINUED | OUTPATIENT
Start: 2017-05-31 | End: 2017-06-02 | Stop reason: HOSPADM

## 2017-05-31 RX ORDER — LATANOPROST 50 UG/ML
1 SOLUTION/ DROPS OPHTHALMIC AT BEDTIME
Status: DISCONTINUED | OUTPATIENT
Start: 2017-05-31 | End: 2017-06-02 | Stop reason: HOSPADM

## 2017-05-31 RX ORDER — FLUTICASONE PROPIONATE 50 MCG
1 SPRAY, SUSPENSION (ML) NASAL DAILY
Status: DISCONTINUED | OUTPATIENT
Start: 2017-06-01 | End: 2017-06-02 | Stop reason: HOSPADM

## 2017-05-31 RX ORDER — LANOLIN ALCOHOL/MO/W.PET/CERES
2000 CREAM (GRAM) TOPICAL DAILY
Status: DISCONTINUED | OUTPATIENT
Start: 2017-06-01 | End: 2017-06-02 | Stop reason: HOSPADM

## 2017-05-31 RX ORDER — FERROUS SULFATE 325(65) MG
325 TABLET ORAL 2 TIMES DAILY
Status: DISCONTINUED | OUTPATIENT
Start: 2017-05-31 | End: 2017-06-01

## 2017-05-31 RX ORDER — POTASSIUM CHLORIDE 750 MG/1
40 TABLET, EXTENDED RELEASE ORAL ONCE
Status: COMPLETED | OUTPATIENT
Start: 2017-05-31 | End: 2017-05-31

## 2017-05-31 RX ORDER — LEVOFLOXACIN 5 MG/ML
750 INJECTION, SOLUTION INTRAVENOUS ONCE
Status: COMPLETED | OUTPATIENT
Start: 2017-05-31 | End: 2017-05-31

## 2017-05-31 RX ORDER — SUCRALFATE 1 G/1
1 TABLET ORAL 4 TIMES DAILY
Status: DISCONTINUED | OUTPATIENT
Start: 2017-05-31 | End: 2017-06-02 | Stop reason: HOSPADM

## 2017-05-31 RX ORDER — ALBUTEROL SULFATE 0.83 MG/ML
2.5 SOLUTION RESPIRATORY (INHALATION) EVERY 4 HOURS PRN
Status: DISCONTINUED | OUTPATIENT
Start: 2017-05-31 | End: 2017-06-02 | Stop reason: HOSPADM

## 2017-05-31 RX ORDER — RISPERIDONE 0.5 MG/1
0.5 TABLET ORAL AT BEDTIME
Status: DISCONTINUED | OUTPATIENT
Start: 2017-05-31 | End: 2017-06-02 | Stop reason: HOSPADM

## 2017-05-31 RX ORDER — CLOPIDOGREL BISULFATE 75 MG/1
75 TABLET ORAL DAILY
Status: DISCONTINUED | OUTPATIENT
Start: 2017-06-01 | End: 2017-06-02 | Stop reason: HOSPADM

## 2017-05-31 RX ORDER — PROCHLORPERAZINE MALEATE 5 MG
10 TABLET ORAL EVERY 8 HOURS PRN
Status: DISCONTINUED | OUTPATIENT
Start: 2017-05-31 | End: 2017-06-02 | Stop reason: HOSPADM

## 2017-05-31 RX ORDER — ACETAMINOPHEN 500 MG
500 TABLET ORAL EVERY 6 HOURS PRN
Status: DISCONTINUED | OUTPATIENT
Start: 2017-05-31 | End: 2017-06-02 | Stop reason: HOSPADM

## 2017-05-31 RX ORDER — PHENYTOIN SODIUM 100 MG/1
200 CAPSULE, EXTENDED RELEASE ORAL 2 TIMES DAILY
Status: DISCONTINUED | OUTPATIENT
Start: 2017-05-31 | End: 2017-06-02 | Stop reason: HOSPADM

## 2017-05-31 RX ORDER — ESCITALOPRAM OXALATE 10 MG/1
10 TABLET ORAL DAILY
Status: DISCONTINUED | OUTPATIENT
Start: 2017-06-01 | End: 2017-06-02 | Stop reason: HOSPADM

## 2017-05-31 RX ORDER — ONDANSETRON 4 MG/1
4 TABLET, ORALLY DISINTEGRATING ORAL EVERY 6 HOURS PRN
Status: DISCONTINUED | OUTPATIENT
Start: 2017-05-31 | End: 2017-06-02 | Stop reason: HOSPADM

## 2017-05-31 RX ORDER — HYDROMORPHONE HYDROCHLORIDE 2 MG/1
2 TABLET ORAL
Status: DISCONTINUED | OUTPATIENT
Start: 2017-05-31 | End: 2017-06-02 | Stop reason: HOSPADM

## 2017-05-31 RX ORDER — LEVETIRACETAM 500 MG/1
500 TABLET ORAL 2 TIMES DAILY
Status: DISCONTINUED | OUTPATIENT
Start: 2017-05-31 | End: 2017-06-02 | Stop reason: HOSPADM

## 2017-05-31 RX ORDER — POLYETHYLENE GLYCOL 3350 17 G/17G
17 POWDER, FOR SOLUTION ORAL DAILY
Status: DISCONTINUED | OUTPATIENT
Start: 2017-05-31 | End: 2017-06-02 | Stop reason: HOSPADM

## 2017-05-31 RX ORDER — SODIUM CHLORIDE 9 MG/ML
1000 INJECTION, SOLUTION INTRAVENOUS CONTINUOUS
Status: DISCONTINUED | OUTPATIENT
Start: 2017-05-31 | End: 2017-06-02 | Stop reason: HOSPADM

## 2017-05-31 RX ORDER — AMOXICILLIN 250 MG
1-2 CAPSULE ORAL 2 TIMES DAILY
Status: DISCONTINUED | OUTPATIENT
Start: 2017-05-31 | End: 2017-06-02 | Stop reason: HOSPADM

## 2017-05-31 RX ORDER — POLYETHYLENE GLYCOL 3350 17 G/17G
17 POWDER, FOR SOLUTION ORAL DAILY
COMMUNITY
End: 2020-08-04

## 2017-05-31 RX ORDER — ZINC SULFATE 50(220)MG
220 CAPSULE ORAL DAILY
Status: DISCONTINUED | OUTPATIENT
Start: 2017-06-01 | End: 2017-06-02 | Stop reason: HOSPADM

## 2017-05-31 RX ORDER — ESTRADIOL 1 MG/1
1 TABLET ORAL DAILY
Status: DISCONTINUED | OUTPATIENT
Start: 2017-06-01 | End: 2017-06-02 | Stop reason: HOSPADM

## 2017-05-31 RX ORDER — DORZOLAMIDE HCL 20 MG/ML
1 SOLUTION/ DROPS OPHTHALMIC 2 TIMES DAILY
Status: DISCONTINUED | OUTPATIENT
Start: 2017-05-31 | End: 2017-06-02 | Stop reason: HOSPADM

## 2017-05-31 RX ORDER — ASPIRIN 81 MG/1
81 TABLET ORAL EVERY MORNING
Status: DISCONTINUED | OUTPATIENT
Start: 2017-06-01 | End: 2017-06-02 | Stop reason: HOSPADM

## 2017-05-31 RX ORDER — CETIRIZINE HYDROCHLORIDE 10 MG/1
10 TABLET ORAL DAILY PRN
Status: DISCONTINUED | OUTPATIENT
Start: 2017-05-31 | End: 2017-06-02 | Stop reason: HOSPADM

## 2017-05-31 RX ORDER — NALOXONE HYDROCHLORIDE 0.4 MG/ML
.1-.4 INJECTION, SOLUTION INTRAMUSCULAR; INTRAVENOUS; SUBCUTANEOUS
Status: DISCONTINUED | OUTPATIENT
Start: 2017-05-31 | End: 2017-06-02 | Stop reason: HOSPADM

## 2017-05-31 RX ORDER — METOPROLOL SUCCINATE 25 MG/1
25 TABLET, EXTENDED RELEASE ORAL DAILY
Status: DISCONTINUED | OUTPATIENT
Start: 2017-05-31 | End: 2017-05-31

## 2017-05-31 RX ORDER — ATORVASTATIN CALCIUM 40 MG/1
40 TABLET, FILM COATED ORAL AT BEDTIME
Status: DISCONTINUED | OUTPATIENT
Start: 2017-05-31 | End: 2017-06-02 | Stop reason: HOSPADM

## 2017-05-31 RX ORDER — PANTOPRAZOLE SODIUM 40 MG/1
40 TABLET, DELAYED RELEASE ORAL DAILY
Status: DISCONTINUED | OUTPATIENT
Start: 2017-06-01 | End: 2017-06-02 | Stop reason: HOSPADM

## 2017-05-31 RX ORDER — SODIUM CHLORIDE 9 MG/ML
1000 INJECTION, SOLUTION INTRAVENOUS CONTINUOUS
Status: DISCONTINUED | OUTPATIENT
Start: 2017-05-31 | End: 2017-05-31

## 2017-05-31 RX ORDER — LEVOFLOXACIN 5 MG/ML
750 INJECTION, SOLUTION INTRAVENOUS ONCE
Status: DISCONTINUED | OUTPATIENT
Start: 2017-05-31 | End: 2017-05-31

## 2017-05-31 RX ORDER — TRAZODONE HYDROCHLORIDE 100 MG/1
100 TABLET ORAL AT BEDTIME
Status: DISCONTINUED | OUTPATIENT
Start: 2017-05-31 | End: 2017-06-02 | Stop reason: HOSPADM

## 2017-05-31 RX ADMIN — ATORVASTATIN CALCIUM 40 MG: 40 TABLET, FILM COATED ORAL at 23:26

## 2017-05-31 RX ADMIN — PHENYTOIN SODIUM 200 MG: 100 CAPSULE ORAL at 19:44

## 2017-05-31 RX ADMIN — POTASSIUM CHLORIDE 40 MEQ: 750 TABLET, EXTENDED RELEASE ORAL at 23:26

## 2017-05-31 RX ADMIN — SODIUM CHLORIDE 1000 ML: 9 INJECTION, SOLUTION INTRAVENOUS at 10:30

## 2017-05-31 RX ADMIN — SENNOSIDES AND DOCUSATE SODIUM 2 TABLET: 8.6; 5 TABLET ORAL at 19:35

## 2017-05-31 RX ADMIN — TRAZODONE HYDROCHLORIDE 100 MG: 100 TABLET ORAL at 23:26

## 2017-05-31 RX ADMIN — ONDANSETRON 4 MG: 4 TABLET, ORALLY DISINTEGRATING ORAL at 19:35

## 2017-05-31 RX ADMIN — PREGABALIN 150 MG: 75 CAPSULE ORAL at 19:35

## 2017-05-31 RX ADMIN — POLYETHYLENE GLYCOL 3350 17 G: 17 POWDER, FOR SOLUTION ORAL at 16:58

## 2017-05-31 RX ADMIN — SUCRALFATE 1 G: 1 TABLET ORAL at 19:35

## 2017-05-31 RX ADMIN — DEXTROSE AND SODIUM CHLORIDE: 5; 450 INJECTION, SOLUTION INTRAVENOUS at 12:48

## 2017-05-31 RX ADMIN — ACETAMINOPHEN 500 MG: 500 TABLET, FILM COATED ORAL at 19:35

## 2017-05-31 RX ADMIN — LEVOFLOXACIN 750 MG: 5 INJECTION, SOLUTION INTRAVENOUS at 12:46

## 2017-05-31 RX ADMIN — RISPERIDONE 0.5 MG: 0.5 TABLET ORAL at 23:26

## 2017-05-31 RX ADMIN — LEVETIRACETAM 500 MG: 500 TABLET, FILM COATED ORAL at 19:35

## 2017-05-31 RX ADMIN — FERROUS SULFATE 325 MG: 325 TABLET, FILM COATED ORAL at 19:35

## 2017-05-31 RX ADMIN — DORZOLAMIDE HYDROCHLORIDE 1 DROP: 20 SOLUTION/ DROPS OPHTHALMIC at 19:44

## 2017-05-31 ASSESSMENT — ENCOUNTER SYMPTOMS
WEAKNESS: 1
WHEEZING: 0
CONSTIPATION: 0
APPETITE CHANGE: 1
COUGH: 0
LIGHT-HEADEDNESS: 0
HEADACHES: 0
POLYDIPSIA: 1
NAUSEA: 0
VOMITING: 0
DYSPHORIC MOOD: 0
CHILLS: 0
FEVER: 1
SHORTNESS OF BREATH: 1
PALPITATIONS: 1
DYSURIA: 1
FREQUENCY: 1
ABDOMINAL PAIN: 0
DIZZINESS: 0
HEMATURIA: 0
ACTIVITY CHANGE: 1
NERVOUS/ANXIOUS: 0

## 2017-05-31 NOTE — LETTER
Transition Communication Hand-off for Care Transitions to Next Level of Care Provider    Name: Sonya Foote  MRN #: [id number]  Primary Care Provider: Allan Casey     [facility] [address] [facility] [address]     Reason for Hospitalization:  Poor appetite [R63.0]  Weakness generalized [R53.1]  Acute pyelonephritis [N10]  Fever, unspecified [R50.9]  Fatigue, unspecified type [R53.83]  Admit Date/Time: 5/31/2017 10:25 AM  Discharge Date: 6/2/17  Payor Source: Payor: MEDICA / Plan: MEDICA DUAL SOLUTIONS [employer]ERS / Product Type: HMO /     Readmission Assessment Measure (BRAEDEN) Risk Score/category: High    Reason for Communication Hand-off Referral: Fragility  Multiple providers/specialties    Discharge Plan: Back to [facility] with previous services       Concern for non-adherence with plan of care:   Y/N no  Discharge Needs Assessment:  Needs       Most Recent Value    Equipment Currently Used at Home power chair, shower chair, commode          Already enrolled in Tele-monitoring program and name of program:  no  Follow-up specialty is recommended: Yes    Follow-up plan:  Future Appointments  Date Time Provider Department Center   6/8/2017 11:40 AM Allan Casey MD [facility]   6/13/2017 2:00 PM Guanaco Kowalski MD [facility] SLEEP   6/28/2017 12:30 PM Elizabeth Oliver MD [facility]   8/28/2017 1:30 PM Alina Jennings MD [facility]   9/8/2017 10:00 AM Monserrat Polk APRN [facility]   11/10/2017 9:20 AM Michelle Irizarry MD [facility]       Any outstanding tests or procedures:        Referrals     Future Labs/Procedures    Home care nursing referral     Comments:    [facility]    RN resume previous orders    Your provider has ordered home care nursing services. If you have not been contacted within 2 days of your discharge please call the inpatient department phone number at [phone] .    Home Care PT Referral for  Hospital Discharge     Comments:    [facility]    PT to eval and treat    Your provider has ordered home care - physical therapy. If you have not been contacted within 2 days of your discharge please call the department phone number listed on the top of this document.    Medication Therapy Management Referral     Comments:    Reason for referral:  on more than 5 medications and managing chronic disease and on more than 10 medications and hospitalized or in the ED in the past 6 months     This service is designed to help you get the most from your medications.  A specially trained pharmacist will work closely with you and your doctors  to solve any problems related to your medications and to help you get the   best results from taking them.      The Medication Therapy Management staff will call you to schedule an appointment.    Urology Adult Referral     Comments:    Follow up with Urology in September after confirming with Cardiology that she can be off the Plavix and ASA            Key Recommendations:  See FLORENCIO Wheeler RN, BSN  Care Coordinator Eve [facility] [address]  Pager: [phone]  Phone: [phone]    AVS/Discharge Summary is the source of truth; this is a helpful guide for improved communication of patient story

## 2017-05-31 NOTE — IP AVS SNAPSHOT
` ` Patient Information     Patient Name Sex Sonya Randle ([id number]) Female 1950       Room Bed    [facility] 5213-01      Patient Demographics     Address Phone E-mail Address    [address]   [place] [address] [phone] (Home) *Preferred*  none (Work)  [phone] (Mobile) [email or web address]      Patient Ethnicity & Race     Ethnic Group Patient Race    American White      Emergency Contact(s)     Name Relation Home Work Mobile    Toña Carranza Spouse [phone]    SHELBY CARRANZA Daughter [phone] none [phone]    Kam Carranza Son [phone]      Documents on File        Status Date Received Description       Documents for the Patient    Privacy Notice - [place] Received 12     External Medication Information Consent Accepted 12     Patient ID Received () 12     Consent for Services - Hospital/Clinic Received () 12     Consent to Communicate Received () 12     Insurance Card Received () 12 medicare    HIM DEVYN Authorization - File Only   release for records from dr [facility] 12    HIM DEVYN Authorization - File Only   [facility]    HIM DEVYN Authorization - File Only   release for records from dr patria portillo [facility] 12    HIM DEVYN Authorization - File Only   MyChart Access    Business/Insurance/Care Coordination/Health Form - Patient.2   [facility]: Patient Care Plan 4.19.12    Consent for Services - Hospital/Clinic Received () 12     Other Received 13 pt bill of rights    Consent for EHR Access  13 Copied from existing Consent for services - C/HOD collected on 2012    HIM DEVYN Authorization - File Only   [facility]  3/7/13    [facility] Specified Other       Insurance Card Received () 13     Patient ID Received  () 13 ID CARD    Consent to Communicate Received () 13     Consent for EHR Access Received 13     HIM DEVYN Authorization  13     Consent for Services - Hospital/Clinic Received () 13     Consent for Services - Hospital/Clinic Received () 13     Patient ID Received 16 DL    Privacy Notice - Blanket Received 14     HIM DEVYN Authorization  14 MAPS    Consent for Services - [facility] Received 14 imaging center consent    Consent for Services - [facility]  14 GENERAL CONSENT FORM: SHARED EHR - ENGLISH    Advance Directives and Living Will Received 14 HEALTH CARE DIRECTIVE 13    Advance Directives and Living Will Not Received  VALIDATION OF AD 13    Consent for Services - Hospital/Clinic Received () 01/08/15     Insurance Card Received 01/12/15 MEDICARE    Insurance Card Received 16 [facility]ica    Consent to Communicate Received 05/14/15 EMAIL    Consent to Communicate Received 05/14/15 PHONE    Physical Therapy Certification Received 05/18/15 5/14/15 - 8/12/15    Consent for Services - Hospital/Clinic Received () 16     Consent for Services/Privacy Notice - Hospital/Clinic Received () 16     Consent for Services/Privacy Notice - Hospital/Clinic-Esign Received 17     HIM DEVYN Authorization  16     HIM DEVYN Authorization  16 . [facility]  2016    Business/Insurance/Care Coordination/Health Form - Patient  10/04/16 MEDICA ESTRADIOL 1MG APPROVAL LETTER 16-17    Business/Insurance/Care Coordination/Health Form - Patient  10/06/16 MEDICA NOTICE OF DENIAL FOR HOME CARE/HOME HEALTH AIDE 16    Insurance Card Received 17 MEDICA    Business/Insurance/Care Coordination/Health Form - Patient  17 MEDICA MIRIXA UTILIZATION MANAGEMENT 3/2/2017    Business/Insurance/Care Coordination/Health Form - Patient  17 MEDICA FORMULARY - SPIRIVA CAP HANDIHLR  3/13/17    Consent for Services - Geriatrics Received 04/07/17     Business/Insurance/Care Coordination/Health Form - Patient  04/26/17 MEDICA-LIDOCAINE PATCH APPROVAL-1/1/17-2/15/18    Consent for Services/Privacy Notice - Hospital/Clinic Received 05/15/17     Insurance Card Received (Deleted) 05/21/13 UCARE CARD    Consent to Communicate Received (Deleted) 09/06/13     Insurance Card Received (Deleted) 03/10/14 ucare    Insurance Card Received (Deleted) 01/08/15     CE Persistent Point of Care Auth Received 05/19/17 CE Persistent Point of Care Authorization       Documents for the Encounter    CMS IM for Patient Signature Received 05/31/17       Admission Information     Attending Provider Admitting Provider Admission Type Admission Date/Time    Pascual Carballo MD Nixon, Lester James, MD Emergency 05/31/17  1025    Discharge Date Hospital Service Auth/Cert Status Service Area     Internal Medicine Incomplete [facility]    Unit Room/Bed Admission Status       UU U5A 5213/5213-01 Admission (Confirmed)       Admission     Complaint    Pyelonephritis      Hospital Account     Name Acct ID Class Status Primary Coverage    Sonya Foote [id number] Inpatient Open MEDICA - [employer] DUAL [employer] [facility]            Guarantor Account (for Hospital Account #[id number])     Name Relation to Pt Service Area Active? Acct Type    Sonya Foote Self FCS Yes Personal/Family    Address Phone          [address]   [place] [address] [phone](H)  none(O)              Coverage Information (for Hospital Account #[id number])     F/O Payor/Plan Precert #    MEDICA/MEDICA DUAL SOLUTIONS [employer]     Subscriber Subscriber #    Sonya Foote [id number]    Address Phone    [address] [address]  [place] [address] [phone]

## 2017-05-31 NOTE — IP AVS SNAPSHOT
MRN:[id number]                      After Visit Summary   5/31/2017    Sonya Foote    MRN: [id number]           Thank you!     Thank you for choosing [facility] for your care. Our goal is always to provide you with excellent care. Hearing back from our patients is one way we can continue to improve our services. Please take a few minutes to complete the written survey that you may receive in the mail after you visit with us. Thank you!        Patient Information     Date Of Birth          1950        Designated Caregiver       Most Recent Value    Caregiver    Will someone help with your care after discharge? yes    Name of designated caregiver [facility]    Phone number of caregiver [phone]    Caregiver address [address] [address]      About your hospital stay     You were admitted on:  May 31, 2017 You last received care in the:  [address] [facility]    You were discharged on:  June 2, 2017        Reason for your hospital stay       Recurrent pyelonephritis                  Who to Call     For medical emergencies, please call 911.  For non-urgent questions about your medical care, please call your primary care provider or clinic, [phone]          Attending Provider     Provider Specialty    Bj Kenyon MD Emergency Medicine    CarballoPascual carbone MD Internal Medicine       Primary Care Provider Office Phone # Fax #    Allan Casey [phone]      After Care Instructions     Activity       Your activity upon discharge: activity as tolerated            Diet       Follow this diet upon discharge: Orders Placed This Encounter      Combination Diet Regular Diet Adult            Discharge Instructions       Do not take the ferrous sulfate within three hours of taking the levofloxacin as the ferrous sulfate can interfere with the levofloxacin                  Follow-up Appointments     Adult [facility] [[email or web address] Follow-up  and recommended labs and tests       Follow up with primary care provider, Allan Casey, within 7-14 days for hospital follow- up.  No follow up labs or test are needed.      Appointments on [place] and/or [facility] (with [facility] or [facility] provider or service). Call [phone] if you haven't heard regarding these appointments within 7 days of discharge.                  Your next 10 appointments already scheduled     Jun 13, 2017  2:00 PM CDT   Return Sleep Patient with Guanaco Kowalski MD   [facility] ([facility])    [address]  [facility] [address]   [phone]            Jun 28, 2017 12:30 PM CDT   Return Visit with Elizabeth Oliver MD   [facility] ([facility])    [address] [place] [address] [address]  [facility] [address]   [phone]            Aug 28, 2017  1:30 PM CDT   (Arrive by 1:15 PM)   Return Visit with Alina Jennings MD   [facility] ([facility])    [address] [place] [address]  [address] [facility] [address]   [phone]            Sep 08, 2017 10:00 AM CDT   (Arrive by 9:45 AM)   [facility] ([facility])    [address] [place] [address]  [address] [facility] [address]   [phone]            Nov 10, 2017  9:20 AM CST   (Arrive by 9:05 AM)   RETURN DIABETES with Michelle Irizarry MD   [facility] ([facility])    [address]  [address] [facility] [address] [phone]              Additional Services     Home Care PT Referral for Hospital Discharge       [facility]    PT to eval and treat    Your provider has ordered home care - physical therapy. If you have not been contacted within 2 days of your discharge please call the  department phone number listed on the top of this document.            Home care nursing referral       [facility]    RN resume previous orders    Your provider has ordered home care nursing services. If you have not been contacted within 2 days of your discharge please call the inpatient department phone number at [phone] .            Medication Therapy Management Referral       Reason for referral:  on more than 5 medications and managing chronic disease and on more than 10 medications and hospitalized or in the ED in the past 6 months     This service is designed to help you get the most from your medications.  A specially trained pharmacist will work closely with you and your doctors  to solve any problems related to your medications and to help you get the   best results from taking them.      The Medication Therapy Management staff will call you to schedule an appointment.            Urology Adult Referral       Follow up with Urology in September after confirming with Cardiology that she can be off the Plavix and ASA                  Further instructions from your care team       _______________________  [facility]  Phone  [phone]  Fax  [phone]  ______________________        Pending Results     Date and Time Order Name Status Description    6/1/2017 0543 Blood culture Preliminary     6/1/2017 0543 Blood culture Preliminary     5/31/2017 1051 Blood culture Preliminary     5/31/2017 1051 Urine Culture Preliminary     5/31/2017 1037 Blood Culture ONE site Preliminary             Statement of Approval     Ordered          06/02/17 1159  I have reviewed and agree with all the recommendations and orders detailed in this document.  EFFECTIVE NOW     Approved and electronically signed by:  Pascual Carballo MD             Admission Information     Date & Time Provider Department Dept. Phone    5/31/2017 Pascual Carballo MD [address] [facility] [phone]      Your Vitals Were  "    Blood Pressure Pulse Temperature Respirations Height Weight    147/75 (BP Location: Right arm) 74 96.1  F (35.6  C) (Oral) 16 1.702 m (5' 7\") 61.8 kg (136 lb 3.9 oz)    Pulse Oximetry BMI (Body Mass Index)                98% 21.34 kg/m2          [employer] Information     [facility] lets you send messages to your doctor, view your test results, renew your prescriptions, schedule appointments and more. To sign up, go to [email or web address] . Click on \"Log in\" on the left side of the screen, which will take you to the Welcome page. Then click on \"Sign up Now\" on the right side of the page.     You will be asked to enter the access code listed below, as well as some personal information. Please follow the directions to create your username and password.     Your access code is: [email or web address]  Expires: 2017 11:52 AM     Your access code will  in 90 days. If you need help or a new code, please call your [place] clinic or [phone].        Care EveryWhere ID     This is your Care EveryWhere ID. This could be used by other organizations to access your [facility] medical records  [id number]           Review of your medicines      START taking        Dose / Directions    levofloxacin 750 MG tablet   Commonly known as:  LEVAQUIN   Indication:  Urinary Tract Infection   Used for:  Acute pyelonephritis        Dose:  750 mg   Take 1 tablet (750 mg) by mouth daily for 11 days   Quantity:  11 tablet   Refills:  0         CONTINUE these medicines which may have CHANGED, or have new prescriptions. If we are uncertain of the size of tablets/capsules you have at home, strength may be listed as something that might have changed.        Dose / Directions    aspirin 81 MG EC tablet   This may have changed:  when to take this   Used for:  Unstable angina (H)        Dose:  81 mg   Take 1 tablet (81 mg) by mouth daily   Quantity:  90 tablet   Refills:  3       atorvastatin 40 MG tablet   Commonly known as:  LIPITOR   This " may have changed:  when to take this   Used for:  Hyperlipidemia LDL goal <100        Dose:  40 mg   Take 1 tablet (40 mg) by mouth daily   Quantity:  90 tablet   Refills:  3       hydrochlorothiazide 12.5 MG capsule   Commonly known as:  MICROZIDE   This may have changed:  additional instructions   Used for:  Essential hypertension with goal blood pressure less than 130/80        Dose:  12.5 mg   Take 1 capsule (12.5 mg) by mouth daily   Quantity:  90 capsule   Refills:  3       ondansetron 4 MG ODT tab   Commonly known as:  ZOFRAN-ODT   This may have changed:    - when to take this  - reasons to take this   Used for:  Intractable vomiting with nausea, unspecified vomiting type        Dose:  4 mg   Take 1 tablet (4 mg) by mouth every 6 hours   Quantity:  120 tablet   Refills:  0       Phenytoin Sodium Extended 200 MG Caps   This may have changed:  additional instructions   Used for:  Seizure disorder (H)        Dose:  200 mg   Take 200 mg by mouth 2 times daily   Quantity:  60 capsule   Refills:  0         CONTINUE these medicines which have NOT CHANGED        Dose / Directions    ACETAMINOPHEN PO        Dose:  1000 mg   Take 1,000 mg by mouth every 6 hours as needed for fever Taking q4-6 hours, per MAR   Refills:  0       ADVAIR DISKUS 250-50 MCG/DOSE diskus inhaler   Generic drug:  fluticasone-salmeterol        Dose:  1 puff   Inhale 1 puff into the lungs 2 times daily   Refills:  0       albuterol (2.5 MG/3ML) 0.083% neb solution   Used for:  Chronic obstructive pulmonary disease, unspecified COPD type (H)        INHALE 1 VIAL VIA NEBULIZER EVERY 6 HOURS AS NEEDED   Quantity:  360 mL   Refills:  11       blood glucose monitoring meter device kit   Used for:  Type 2 diabetes, HbA1C goal < 8% (H)        Use to test blood sugars 3 times daily or as directed.   Quantity:  1 kit   Refills:  0       blood glucose monitoring test strip   Commonly known as:  ONE TOUCH ULTRA   Used for:  Type 2 diabetes mellitus with  diabetic peripheral angiopathy without gangrene, without long-term current use of insulin (H)        Use to test blood sugars 3 times daily or as directed.   Quantity:  3 Box   Refills:  3       calcium citrate-vitamin D 315-250 MG-UNIT Tabs per tablet   Commonly known as:  CITRACAL   Used for:  Osteoporosis        Dose:  2 tablet   Take 2 tablets by mouth daily   Quantity:  120 tablet   Refills:  5       cetirizine 10 MG tablet   Commonly known as:  zyrTEC   Used for:  Seasonal allergic rhinitis, unspecified allergic rhinitis trigger        Dose:  10 mg   Take 1 tablet (10 mg) by mouth daily as needed for allergies   Quantity:  90 tablet   Refills:  3       clopidogrel 75 MG tablet   Commonly known as:  PLAVIX   Used for:  PAD (peripheral artery disease) (H), UGI bleed, Osteoporosis, Nausea        Dose:  75 mg   Take 1 tablet (75 mg) by mouth daily   Quantity:  90 tablet   Refills:  3       CYANOCOBALAMIN PO        Dose:  2000 mcg   Take 2,000 mcg by mouth daily   Refills:  0       diclofenac 1 % Gel topical gel   Commonly known as:  VOLTAREN   Used for:  Primary osteoarthritis of both hands        Dose:  2 g   Apply 2 g topically 4 times daily to hands   Quantity:  100 g   Refills:  11       dorzolamide 2 % ophthalmic solution   Commonly known as:  TRUSOPT        Dose:  1 drop   Place 1 drop into both eyes 2 times daily   Refills:  0       * EASY TOUCH LANCETS 30G/TWIST Misc        Refills:  0       * thin lancets   Commonly known as:  NO BRAND SPECIFIED   Used for:  Type 2 diabetes, HbA1C goal < 8% (H)        Use to test blood sugar 3 times daily or as directed.   Quantity:  1 Box   Refills:  10       * blood glucose monitoring lancets   Used for:  Type 2 diabetes, HbA1C goal < 8% (H)        Use to test blood sugar 3 times daily or as directed.   Quantity:  3 Box   Refills:  3       EPINEPHrine 0.15 MG/0.3ML injection   Commonly known as:  EPIPEN JR   Used for:  Bee sting reaction, undetermined intent, subsequent  encounter        Dose:  0.15 mg   Inject 0.3 mLs (0.15 mg) into the muscle as needed for anaphylaxis   Quantity:  0.6 mL   Refills:  1       ESCITALOPRAM OXALATE PO        Dose:  10 mg   Take 10 mg by mouth daily   Refills:  0       estradiol 1 MG tablet   Commonly known as:  ESTRACE   Used for:  Person who has had sex change operation        Dose:  1 mg   Take 1 tablet (1 mg) by mouth daily   Quantity:  90 tablet   Refills:  3       ferrous sulfate 325 (65 FE) MG tablet   Commonly known as:  IRON        Dose:  325 mg   Take 1 tablet (325 mg) by mouth 2 times daily With meals   Quantity:  60 tablet   Refills:  2       fluticasone 50 MCG/ACT spray   Commonly known as:  FLONASE        Dose:  1 spray   Spray 1 spray into both nostrils daily   Refills:  0       HYDROmorphone 2 MG tablet   Commonly known as:  DILAUDID        Dose:  2 mg   Take 1 tablet (2 mg) by mouth every 3 hours as needed for moderate to severe pain   Quantity:  30 tablet   Refills:  0       RENÉE Pack        Dose:  1 packet   Take 1 packet by mouth 2 times daily   Refills:  0       latanoprost 0.005 % ophthalmic solution   Commonly known as:  XALATAN   Used for:  Primary open angle glaucoma, stage unspecified        Dose:  1 drop   Place 1 drop into both eyes At Bedtime   Quantity:  2.5 mL   Refills:  11       levETIRAcetam 500 MG tablet   Commonly known as:  KEPPRA   Used for:  Nausea        Dose:  500 mg   Take 1 tablet (500 mg) by mouth 2 times daily   Quantity:  180 tablet   Refills:  1       lisinopril 10 MG tablet   Commonly known as:  PRINIVIL/ZESTRIL   Used for:  Essential hypertension with goal blood pressure less than 130/80        Dose:  10 mg   Take 1 tablet (10 mg) by mouth daily   Quantity:  90 tablet   Refills:  3       MEDICATION GIVEN BY INTRATHECAL PUMP - INSTRUCTION        continuous May 19, 2017 - per [facility] [place] [phone]: Conc: Bupivacaine 20 mg/mL and Fentanyl 2000 mcg/mL. Continuous:  Bupivacaine 7.265 mg/day and Fentanyl 726.5 mcg/day. Boluses: Up to 7 boluses per 24-hr period of Bupivicaine 0.599 mg and Fentanyl 59.9 mcg  Pump Refill Date at [facility]: 7/2/17   Refills:  0       metoprolol 25 MG 24 hr tablet   Commonly known as:  TOPROL-XL   Used for:  Old myocardial infarction        Dose:  25 mg   Take 1 tablet (25 mg) by mouth daily   Quantity:  30 tablet   Refills:  0       MULTIVITAMIN PO        Dose:  1 tablet   Take 1 tablet by mouth daily   Refills:  0       nitroglycerin 0.4 MG sublingual tablet   Commonly known as:  NITROSTAT   Used for:  Chronic systolic congestive heart failure (H), Old myocardial infarction        Dose:  0.4 mg   Place 1 tablet (0.4 mg) under the tongue every 5 minutes as needed for chest pain if you are still having symptoms after 3 doses (15 minutes) call 911.   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)        Equipment being ordered: Glucerna daily shakes with each meal   Quantity:  1 Box   Refills:  11       * order for DME   Used for:  Type 2 diabetes mellitus with diabetic peripheral angiopathy without gangrene, without long-term current use of insulin (H)        ACcu check BID   Quantity:  1 Device   Refills:  0       * order for DME   Used for:  Simple chronic bronchitis (H)        Equipment being ordered: Nebulizer and tubing supplies   Quantity:  1 Units   Refills:  0       * order  for DME   Used for:  Chronic obstructive pulmonary disease, unspecified COPD type (H)        Equipment being ordered: CPAP supplies mask, hose, filters, cushion fax to [facility] at [phone] Disp: 10 DeviceRefills: prn Class: Local PrintStart: 2/10/2017   Quantity:  1 Device   Refills:  0       * order for DME   Used for:  COPD (chronic obstructive pulmonary disease) (H)        Equipment being ordered: CPAP supplies mask, hose, filters, cushion  fax to [facility] at [phone]   Quantity:  10 Device   Refills:  prn       * order for DME   Used for:  Critical lower limb ischemia        Equipment being ordered: wheelchair seat cushion   Quantity:  1 Device   Refills:  0       pantoprazole 40 MG EC tablet   Commonly known as:  PROTONIX        Dose:  40 mg   Take 40 mg by mouth daily   Refills:  0       polyethylene glycol Packet   Commonly known as:  MIRALAX/GLYCOLAX        Dose:  17 g   Take 17 g by mouth daily Dissolved in water or juice   Refills:  0       pregabalin 75 MG capsule   Commonly known as:  LYRICA   Used for:  Pain in both upper extremities        Dose:  150 mg   Take 2 capsules (150 mg) by mouth 2 times daily   Quantity:  120 capsule   Refills:  5       prochlorperazine 10 MG tablet   Commonly known as:  COMPAZINE   Used for:  Nausea with vomiting        Dose:  10 mg   Take 1 tablet (10 mg) by mouth every 8 hours as needed   Quantity:  20 tablet   Refills:  0       risperiDONE 0.5 MG tablet   Commonly known as:  risperDAL        Dose:  0.5 mg   Take 0.5 mg by mouth At Bedtime   Refills:  0       SPIRIVA HANDIHALER 18 MCG capsule   Used for:  COPD (chronic obstructive pulmonary disease) (H)   Generic drug:  tiotropium        INHALE THE CONTENTS OF 1 CAPSULE VIA INHALATION DEVICE DAILY   Quantity:  30 capsule   Refills:  2       sucralfate 1 GM tablet   Commonly known as:  CARAFATE   Used for:  Gastroesophageal reflux disease without esophagitis        Dose:  1 g   Take 1 tablet (1 g) by  mouth 4 times daily May dissolve in 10 mL water is necessary. (Start upon completion of carafate suspension)   Quantity:  40 tablet   Refills:  5       traZODone 100 MG tablet   Commonly known as:  DESYREL   Used for:  Anxiety state        Dose:  100 mg   Take 1 tablet (100 mg) by mouth At Bedtime   Quantity:  90 tablet   Refills:  3       vitamin D 2000 UNITS tablet        Dose:  2000 Units   Take 2,000 Units by mouth daily.   Quantity:  100 tablet   Refills:  3       zinc 50 MG Tabs        Dose:  1 tablet   Take 1 tablet by mouth daily   Refills:  0       * Notice:  This list has 12 medication(s) that are the same as other medications prescribed for you. Read the directions carefully, and ask your doctor or other care provider to review them with you.         Where to get your medicines      These medications were sent to [facility] [place], [place] - [address]  [address] [address]     Phone:  [phone]     levofloxacin 750 MG tablet                Protect others around you: Learn how to safely use, store and throw away your medicines at www.disposemymeds.org.             Medication List: This is a list of all your medications and when to take them. Check marks below indicate your daily home schedule. Keep this list as a reference.      Medications           Morning Afternoon Evening Bedtime As Needed    ACETAMINOPHEN PO   Take 1,000 mg by mouth every 6 hours as needed for fever Taking q4-6 hours, per MAR   Last time this was given:  500 mg on 6/2/2017  3:04 AM                                ADVAIR DISKUS 250-50 MCG/DOSE diskus inhaler   Inhale 1 puff into the lungs 2 times daily   Generic drug:  fluticasone-salmeterol                                albuterol (2.5 MG/3ML) 0.083% neb solution   INHALE 1 VIAL VIA NEBULIZER EVERY 6 HOURS AS NEEDED                                aspirin 81 MG EC tablet   Take 1 tablet (81 mg) by mouth daily   Last time this was  given:  81 mg on 6/2/2017  8:48 AM                                atorvastatin 40 MG tablet   Commonly known as:  LIPITOR   Take 1 tablet (40 mg) by mouth daily   Last time this was given:  40 mg on 6/1/2017 10:01 PM                                blood glucose monitoring meter device kit   Use to test blood sugars 3 times daily or as directed.                                blood glucose monitoring test strip   Commonly known as:  ONE TOUCH ULTRA   Use to test blood sugars 3 times daily or as directed.                                calcium citrate-vitamin D 315-250 MG-UNIT Tabs per tablet   Commonly known as:  CITRACAL   Take 2 tablets by mouth daily   Last time this was given:  2 tablets on 6/2/2017  8:50 AM                                cetirizine 10 MG tablet   Commonly known as:  zyrTEC   Take 1 tablet (10 mg) by mouth daily as needed for allergies                                clopidogrel 75 MG tablet   Commonly known as:  PLAVIX   Take 1 tablet (75 mg) by mouth daily   Last time this was given:  75 mg on 6/2/2017  8:50 AM                                CYANOCOBALAMIN PO   Take 2,000 mcg by mouth daily   Last time this was given:  2,000 mcg on 6/2/2017  8:50 AM                                diclofenac 1 % Gel topical gel   Commonly known as:  VOLTAREN   Apply 2 g topically 4 times daily to hands   Last time this was given:  2 g on 6/2/2017  8:54 AM                                dorzolamide 2 % ophthalmic solution   Commonly known as:  TRUSOPT   Place 1 drop into both eyes 2 times daily   Last time this was given:  1 drop on 6/2/2017  8:50 AM                                * EASY TOUCH LANCETS 30G/TWIST Misc                                * thin lancets   Commonly known as:  NO BRAND SPECIFIED   Use to test blood sugar 3 times daily or as directed.                                * blood glucose monitoring lancets   Use to test blood sugar 3 times daily or as directed.                                 EPINEPHrine 0.15 MG/0.3ML injection   Commonly known as:  EPIPEN JR   Inject 0.3 mLs (0.15 mg) into the muscle as needed for anaphylaxis                                ESCITALOPRAM OXALATE PO   Take 10 mg by mouth daily   Last time this was given:  10 mg on 6/2/2017  8:50 AM                                estradiol 1 MG tablet   Commonly known as:  ESTRACE   Take 1 tablet (1 mg) by mouth daily   Last time this was given:  1 mg on 6/2/2017  8:50 AM                                ferrous sulfate 325 (65 FE) MG tablet   Commonly known as:  IRON   Take 1 tablet (325 mg) by mouth 2 times daily With meals   Last time this was given:  325 mg on 6/1/2017  8:38 AM                                fluticasone 50 MCG/ACT spray   Commonly known as:  FLONASE   Spray 1 spray into both nostrils daily   Last time this was given:  1 spray on 6/1/2017  8:40 AM                                hydrochlorothiazide 12.5 MG capsule   Commonly known as:  MICROZIDE   Take 1 capsule (12.5 mg) by mouth daily                                HYDROmorphone 2 MG tablet   Commonly known as:  DILAUDID   Take 1 tablet (2 mg) by mouth every 3 hours as needed for moderate to severe pain   Last time this was given:  2 mg on 6/2/2017  8:49 AM                                RENÉE Pack   Take 1 packet by mouth 2 times daily                                latanoprost 0.005 % ophthalmic solution   Commonly known as:  XALATAN   Place 1 drop into both eyes At Bedtime   Last time this was given:  1 drop on 6/1/2017 10:03 PM                                levETIRAcetam 500 MG tablet   Commonly known as:  KEPPRA   Take 1 tablet (500 mg) by mouth 2 times daily   Last time this was given:  500 mg on 6/2/2017  8:50 AM                                levofloxacin 750 MG tablet   Commonly known as:  LEVAQUIN   Take 1 tablet (750 mg) by mouth daily for 11 days   Last time this was given:  750 mg on 6/2/2017  8:49 AM                                lisinopril 10 MG tablet    Commonly known as:  PRINIVIL/ZESTRIL   Take 1 tablet (10 mg) by mouth daily                                MEDICATION GIVEN BY INTRATHECAL PUMP - INSTRUCTION   continuous May 19, 2017 - per [facility] in [place] [phone]: Conc: Bupivacaine 20 mg/mL and Fentanyl 2000 mcg/mL. Continuous: Bupivacaine 7.265 mg/day and Fentanyl 726.5 mcg/day. Boluses: Up to 7 boluses per 24-hr period of Bupivicaine 0.599 mg and Fentanyl 59.9 mcg  Pump Refill Date at [facility]: 7/2/17   Last time this was given:  5/31/2017  7:34 PM                                metoprolol 25 MG 24 hr tablet   Commonly known as:  TOPROL-XL   Take 1 tablet (25 mg) by mouth daily                                MULTIVITAMIN PO   Take 1 tablet by mouth daily                                nitroglycerin 0.4 MG sublingual tablet   Commonly known as:  NITROSTAT   Place 1 tablet (0.4 mg) under the tongue every 5 minutes as needed for chest pain if you are still having symptoms after 3 doses (15 minutes) call 911.                                ondansetron 4 MG ODT tab   Commonly known as:  ZOFRAN-ODT   Take 1 tablet (4 mg) by mouth every 6 hours   Last time this was given:  4 mg on 5/31/2017  7:35 PM                                * order for DME   Equipment being ordered: Depends briefs                                * order for DME   Equipment being ordered: Power Wheelchair                                * order for DME   Equipment being ordered: Nebulizer and supplies                                * order for DME   Equipment being ordered: Glucerna daily shakes with each meal                                * order for DME   ACcu check BID                                * order for DME   Equipment being ordered: Nebulizer and tubing supplies                                * order for DME   Equipment being ordered: CPAP supplies mask, hose, filters, cushion fax to [facility] at [phone] Disp: 10 DeviceRefills:  prn Class: Local PrintStart: 2/10/2017                                * order for DME   Equipment being ordered: CPAP supplies mask, hose, filters, cushion  fax to [facility] at [phone]                                * order for DME   Equipment being ordered: wheelchair seat cushion                                pantoprazole 40 MG EC tablet   Commonly known as:  PROTONIX   Take 40 mg by mouth daily   Last time this was given:  40 mg on 6/2/2017  8:50 AM                                Phenytoin Sodium Extended 200 MG Caps   Take 200 mg by mouth 2 times daily   Last time this was given:  200 mg on 6/2/2017  8:49 AM                                polyethylene glycol Packet   Commonly known as:  MIRALAX/GLYCOLAX   Take 17 g by mouth daily Dissolved in water or juice   Last time this was given:  17 g on 6/1/2017  8:39 AM                                pregabalin 75 MG capsule   Commonly known as:  LYRICA   Take 2 capsules (150 mg) by mouth 2 times daily   Last time this was given:  150 mg on 6/2/2017  8:50 AM                                prochlorperazine 10 MG tablet   Commonly known as:  COMPAZINE   Take 1 tablet (10 mg) by mouth every 8 hours as needed                                risperiDONE 0.5 MG tablet   Commonly known as:  risperDAL   Take 0.5 mg by mouth At Bedtime   Last time this was given:  0.5 mg on 6/1/2017 10:01 PM                                SPIRIVA HANDIHALER 18 MCG capsule   INHALE THE CONTENTS OF 1 CAPSULE VIA INHALATION DEVICE DAILY   Generic drug:  tiotropium                                sucralfate 1 GM tablet   Commonly known as:  CARAFATE   Take 1 tablet (1 g) by mouth 4 times daily May dissolve in 10 mL water is necessary. (Start upon completion of carafate suspension)   Last time this was given:  1 g on 6/2/2017  8:50 AM                                traZODone 100 MG tablet   Commonly known as:  DESYREL   Take 1 tablet (100 mg) by mouth At Bedtime   Last time this was given:   100 mg on 6/1/2017 10:01 PM                                vitamin D 2000 UNITS tablet   Take 2,000 Units by mouth daily.   Last time this was given:  2,000 Units on 6/2/2017  8:50 AM                                zinc 50 MG Tabs   Take 1 tablet by mouth daily                                * Notice:  This list has 12 medication(s) that are the same as other medications prescribed for you. Read the directions carefully, and ask your doctor or other care provider to review them with you.

## 2017-05-31 NOTE — TELEPHONE ENCOUNTER
"[place], nurse from [facility], requesting orders for patient to be seen in ER.  States fever of 102 and rising, body aches, generalized discomfort.  Explained that patient should not need orders to go to ER.  Nurse sounded irritated with me and stated \"it's standard practice!\"  Can patient go to ER for evaluation?  Please advise.  "

## 2017-05-31 NOTE — H&P
[facility], [place]  Internal Medicine History and Physical - [facility]       Date of Admission:  5/31/2017    Chief Complaint   AMS, fevers, dysuria    History of Present Illness   Sonya Foote is a 67 year old female with multiple medical problems including CAD, s/p inferior MI with COLLEEN to pRCA & mRCA 9/17/2016, HTN, HLD, PVD s/p bilateral AKA,  h/o LLE blood clot prior to amputation, sickle cell trait, h/o anemia, COPD, asthma, GERD, dysphagia with history of esophageal stricture, s/p dilation, NIDDM, neuropathy, chronic low back pain with intrathecal pump, seizure disorder, h/o CVA s/p stent to left carotid,  depression/anxiety, schizoaffective disorder, and sexual reassignment surgery (male to female) who presents from her nursing home with fevers, lethargy, and decreased appetite over the last few days.    Reportedly feeling ill for 2-3 days and per nursing facility has not had a bowel movement. With this, she has felt feverish (confirmed 102.2 at facility), fatigued, with poor PO intake. Also reports urinary frequency and dysuria with consequent thirst.    In ED CXR, UA with micro, urine culture, CBC, BMP, lactic acid, and blood cultures obtained.  CXR (preliminary reading) did not show any infiltrates.  Lactic acid was normal.   UA showed large LE and WBC. Patient given 1 liter IVF and given 1 dose of IV Levaquin.    Review of Systems   The 10 point Review of Systems is negative other than noted in the HPI or here. Dysuria, fever, urinary frequency, appetite disturbance, weakness, confusion    Past Medical History    I have reviewed this patient's medical history and updated it with pertinent information if needed.   Past Medical History:   Diagnosis Date     Anemia      Antiplatelet or antithrombotic long-term use      Arthritis      AS (sickle cell trait) (H) 10/8/2013     Blind left eye      BPPV (benign paroxysmal positional vertigo)      Chronic back pain      COPD  (chronic obstructive pulmonary disease) (H)      Coronary artery disease      CVA (cerebral infarction) 10/8/2012    x3, residual left sided weakness     Diastolic CHF (H) 9/4/2012     Dilantin toxicity 5/31/2013     Esophageal candidiasis (H)      GERD (gastroesophageal reflux disease)      Heart attack (H)      Hernia, abdominal      History of blood transfusion     x5     History of thrombophlebitis      HTN (hypertension) 9/4/2012     Hyperlipidemia LDL goal <100 9/4/2012     Legally blind in right eye, as defined in [place]     No periphal vision right eye     Osteoporosis 1/21/2013     Other chronic pain      PAD (peripheral artery disease) (H)      Palpitations      Person who has had sex change operation 1/20/2014     Pneumonia      Schizoaffective disorder (H)      Seizure disorder (H) 9/4/2012     Seizures (H)      Sleep apnea     CPAP     Thrombosis of leg      Type 2 diabetes, HbA1C goal < 8% (H) 9/4/2012     Uncomplicated asthma      Unspecified cerebral artery occlusion with cerebral infarction      Past Surgical History   I have reviewed this patient's surgical history and updated it with pertinent information if needed.  Past Surgical History:   Procedure Laterality Date     AMPUTATE LEG ABOVE KNEE Left 6/11/2016    Procedure: AMPUTATE LEG ABOVE KNEE;  Surgeon: Mello Rodriguez MD;  [facility]     AMPUTATE LEG BELOW KNEE Right 11/7/2016    Procedure: AMPUTATE LEG BELOW KNEE;  Surgeon: Savannah Durant MD;  [facility]     AMPUTATE REVISION STUMP LOWER EXTREMITY Right 11/11/2016    Procedure: AMPUTATE REVISION STUMP LOWER EXTREMITY;  Surgeon: Savannah Durant MD;  [facility]     AMPUTATE REVISION STUMP LOWER EXTREMITY Right 11/16/2016    Procedure: AMPUTATE REVISION STUMP LOWER EXTREMITY;  Surgeon: Savannah Durant MD;  [facility]     AMPUTATE TOE(S) Right 1/5/2016    Procedure: AMPUTATE TOE(S);  Surgeon: Mello Gaines DPM;  Location:  [facility]     ANGIOGRAM Bilateral 11/21/2014     Procedure: ANGIOGRAM;  Surgeon: Savannah Durant MD;  [facility]     ANGIOGRAM Left 1/16/2015    Procedure: ANGIOGRAM;  Surgeon: Savannah Durant MD;  [facility]     ANGIOGRAM Bilateral 9/14/2015    Procedure: ANGIOGRAM;  Surgeon: Savannah Durant MD;  [facility]     ANGIOGRAM Left 10/12/2015    Procedure: ANGIOGRAM;  Surgeon: Savannah Durant MD;  [facility]     ANGIOGRAM Right 6/6/2016    Procedure: ANGIOGRAM;  Surgeon: Savannah Durant MD;  [facility]     ANGIOPLASTY Right 6/6/2016    Procedure: ANGIOPLASTY;  Surgeon: Savannah Durant MD;  [facility]     APPENDECTOMY       BREAST SURGERY      right breast bx (benign)     CHOLECYSTECTOMY       COLONOSCOPY N/A 8/25/2014    Procedure: COLONOSCOPY;  Surgeon: Mello Ferrer MD;  [facility]     COLONOSCOPY WITH CO2 INSUFFLATION N/A 8/20/2014    Procedure: COLONOSCOPY WITH CO2 INSUFFLATION;  Surgeon: Duane, William Charles, MD;  Location: [facility]     ENDARTERECTOMY FEMORAL  5/23/2014    Procedure: ENDARTERECTOMY FEMORAL;  Surgeon: Jason Joshi MD;  [facility]     ESOPHAGOSCOPY, GASTROSCOPY, DUODENOSCOPY (EGD), COMBINED  12/14/2012    Procedure: COMBINED ESOPHAGOSCOPY, GASTROSCOPY, DUODENOSCOPY (EGD), BIOPSY SINGLE OR MULTIPLE;  ESOPHAGOSCOPY, GASTROSCOPY, DUODENOSCOPY (EGD), DILATATION ;  Surgeon: Elizabeth Stevenson MD;  Location:  GI     ESOPHAGOSCOPY, GASTROSCOPY, DUODENOSCOPY (EGD), COMBINED  12/31/2013    Procedure: COMBINED ESOPHAGOSCOPY, GASTROSCOPY, DUODENOSCOPY (EGD), BIOPSY SINGLE OR MULTIPLE;;  Surgeon: Clemente Lopez MD;  [facility]     ESOPHAGOSCOPY, GASTROSCOPY, DUODENOSCOPY (EGD), COMBINED  4/1/2014    Procedure: COMBINED ESOPHAGOSCOPY, GASTROSCOPY, DUODENOSCOPY (EGD);;  Surgeon: Clemente Lopez MD;  [facility]     ESOPHAGOSCOPY, GASTROSCOPY, DUODENOSCOPY (EGD), COMBINED  6/28/2014    Procedure: COMBINED ESOPHAGOSCOPY, GASTROSCOPY, DUODENOSCOPY (EGD);  Surgeon: Clemente Lopez MD;  Location: [facility]      ESOPHAGOSCOPY, GASTROSCOPY, DUODENOSCOPY (EGD), COMBINED N/A 8/20/2014    Procedure: COMBINED ESOPHAGOSCOPY, GASTROSCOPY, DUODENOSCOPY (EGD), BIOPSY SINGLE OR MULTIPLE;  Surgeon: Duane, William Charles, MD;  [facility]     ESOPHAGOSCOPY, GASTROSCOPY, DUODENOSCOPY (EGD), COMBINED N/A 8/22/2014    Procedure: COMBINED ESOPHAGOSCOPY, GASTROSCOPY, DUODENOSCOPY (EGD), BIOPSY SINGLE OR MULTIPLE;  Surgeon: Mello Ferrer MD;  [facility]     ESOPHAGOSCOPY, GASTROSCOPY, DUODENOSCOPY (EGD), COMBINED N/A 10/2/2014    Procedure: COMBINED ESOPHAGOSCOPY, GASTROSCOPY, DUODENOSCOPY (EGD), BIOPSY SINGLE OR MULTIPLE;  Surgeon: Remy Haskins MD;  [facility]     ESOPHAGOSCOPY, GASTROSCOPY, DUODENOSCOPY (EGD), COMBINED Left 12/15/2014    Procedure: COMBINED ESOPHAGOSCOPY, GASTROSCOPY, DUODENOSCOPY (EGD), BIOPSY SINGLE OR MULTIPLE;  Surgeon: Remy Haskins MD;  [facility]     ESOPHAGOSCOPY, GASTROSCOPY, DUODENOSCOPY (EGD), COMBINED N/A 2/25/2015    Procedure: COMBINED ENDOSCOPIC ULTRASOUND, ESOPHAGOSCOPY, GASTROSCOPY, DUODENOSCOPY (EGD), FINE NEEDLE ASPIRATE/BIOPSY;  Surgeon: Clemente Lugo MD;  [facility]     ESOPHAGOSCOPY, GASTROSCOPY, DUODENOSCOPY (EGD), COMBINED Left 2/25/2015    Procedure: COMBINED ESOPHAGOSCOPY, GASTROSCOPY, DUODENOSCOPY (EGD), BIOPSY SINGLE OR MULTIPLE;  Surgeon: Clemente Lugo MD;  [facility]     ESOPHAGOSCOPY, GASTROSCOPY, DUODENOSCOPY (EGD), COMBINED N/A 9/25/2016    Procedure: COMBINED ESOPHAGOSCOPY, GASTROSCOPY, DUODENOSCOPY (EGD);  Surgeon: Aziza Patiño MD;  [facility]     ESOPHAGOSCOPY, GASTROSCOPY, DUODENOSCOPY (EGD), COMBINED N/A 1/18/2017    Procedure: COMBINED ESOPHAGOSCOPY, GASTROSCOPY, DUODENOSCOPY (EGD), BIOPSY SINGLE OR MULTIPLE;  Surgeon: Clemente Lopez MD;  [facility]     FASCIOTOMY LOWER EXTREMITY Left 6/10/2016    Procedure: FASCIOTOMY LOWER EXTREMITY;  Surgeon: Mello Rodriguez MD;  [facility]      CAPSULE  ENDOSCOPY N/A 8/25/2014    Procedure: CAPSULE/PILL CAM ENDOSCOPY;  Surgeon: Remy Haskins MD;  Location:  [facility]     HC CAPSULE ENDOSCOPY N/A 10/2/2014    Procedure: CAPSULE/PILL CAM ENDOSCOPY;  Surgeon: Remy Haskins MD;  Location:  [facility]     ORTHOPEDIC SURGERY      broken wrist repair     SEX TRANSFORMATION SURGERY, MALE TO FEMALE      1974     SINUS SURGERY      cyst removed     TONSILLECTOMY       VASCULAR SURGERY      Left carotid stent      Social History   Social History   Substance Use Topics     Smoking status: Former Smoker     Packs/day: 2.50     Years: 30.00     Types: Cigarettes, Cigars     Quit date: 11/1/2000     Smokeless tobacco: Never Used     Alcohol use No   Has a female partner that lives with her in her assisted facility. Has several children together.    Family History   I have reviewed this patient's family history and updated it with pertinent information if needed.   Family History   Problem Relation Age of Onset     Dementia Mother      Glaucoma Mother      DIABETES Mother      Coronary Artery Disease Mother      MI     Glaucoma Father      DIABETES Father      Heart Failure Father      CANCER Maternal Aunt      leukemia     Schizophrenia Brother      Depression Brother      Suicide Sister      Depression Sister      DIABETES Sister      CANCER Maternal Aunt      ovarian     Glaucoma Maternal Grandmother      DIABETES Maternal Grandmother      Glaucoma Maternal Grandfather      DIABETES Maternal Grandfather      Glaucoma Paternal Grandmother      DIABETES Paternal Grandmother      Glaucoma Paternal Grandfather      DIABETES Paternal Grandfather      Breast Cancer Sister      CEREBROVASCULAR DISEASE Brother      Prior to Admission Medications   Prior to Admission Medications   Prescriptions Last Dose Informant Patient Reported? Taking?   ACETAMINOPHEN PO 5/30/2017 at 2000  Yes Yes   Sig: Take 1,000 mg by mouth every 6 hours as needed for fever Taking q4-6 hours, per MAR    ADVAIR DISKUS 250-50 MCG/DOSE diskus inhaler 5/31/2017 at 0800  Yes No   Sig: Inhale 1 puff into the lungs 2 times daily    CYANOCOBALAMIN PO 5/31/2017 at 0800  Yes No   Sig: Take 2,000 mcg by mouth daily   Cholecalciferol (VITAMIN D) 2000 UNITS tablet 5/31/2017 at 0800 Self Yes No   Sig: Take 2,000 Units by mouth daily.   EASY TOUCH LANCETS 30G/TWIST MISC  Self Yes No   EPINEPHrine (EPIPEN JR) 0.15 MG/0.3ML injection   No No   Sig: Inject 0.3 mLs (0.15 mg) into the muscle as needed for anaphylaxis   ESCITALOPRAM OXALATE PO 5/31/2017 at 0800  Yes Yes   Sig: Take 10 mg by mouth daily   HYDROmorphone (DILAUDID) 2 MG tablet 5/31/2017 at 0230  No No   Sig: Take 1 tablet (2 mg) by mouth every 3 hours as needed for moderate to severe pain   MEDICATION GIVEN BY INTRATHECAL PUMP - INSTRUCTION   Yes No   Sig: continuous May 19, 2017 - per [facility] in [place] [phone]:  Conc: Bupivacaine 20 mg/mL and Fentanyl 2000 mcg/mL.  Continuous: Bupivacaine 7.265 mg/day and Fentanyl 726.5 mcg/day.  Boluses: Up to 7 boluses per 24-hr period of Bupivicaine 0.599 mg and Fentanyl 59.9 mcg    Pump Refill Date at [facility]: 7/2/17   Multiple Vitamin (MULTIVITAMIN OR) 5/31/2017 at 0800 Self Yes No   Sig: Take 1 tablet by mouth daily    Nutritional Supplements (RENÉE) PACK Unknown at Unknown time  Yes No   Sig: Take 1 packet by mouth 2 times daily   ORDER FOR DME   No No   Sig: Equipment being ordered: Depends briefs   ORDER FOR DME   No No   Sig: Equipment being ordered: Power Wheelchair   ORDER FOR DME   No No   Sig: Equipment being ordered: Nebulizer and supplies   SPIRIVA HANDIHALER 18 MCG inhalation capsule 5/31/2017 at 0800  No No   Sig: INHALE THE CONTENTS OF 1 CAPSULE VIA INHALATION DEVICE DAILY   albuterol (2.5 MG/3ML) 0.083% neb solution Unknown at Unknown time  No No   Sig: INHALE 1 VIAL VIA NEBULIZER EVERY 6 HOURS AS NEEDED   aspirin EC 81 MG EC tablet 5/31/2017 at 0800  No No   Sig: Take 1  tablet (81 mg) by mouth daily   Patient taking differently: Take 81 mg by mouth every morning    atorvastatin (LIPITOR) 40 MG tablet 5/30/2017 at 2000  No No   Sig: Take 1 tablet (40 mg) by mouth daily   Patient taking differently: Take 40 mg by mouth At Bedtime    blood glucose monitoring (ONE TOUCH ULTRA 2) meter device kit   No No   Sig: Use to test blood sugars 3 times daily or as directed.   blood glucose monitoring (ONE TOUCH ULTRA) test strip   No No   Sig: Use to test blood sugars 3 times daily or as directed.   blood glucose monitoring (ONE TOUCH ULTRASOFT) lancets   No No   Sig: Use to test blood sugar 3 times daily or as directed.   calcium citrate-vitamin D (CITRACAL) 315-250 MG-UNIT TABS 5/31/2017 at 0800  No No   Sig: Take 2 tablets by mouth daily   cetirizine (ZYRTEC) 10 MG tablet 5/31/2017 at 0800  No No   Sig: Take 1 tablet (10 mg) by mouth daily as needed for allergies   clopidogrel (PLAVIX) 75 MG tablet 5/31/2017 at 0800  No No   Sig: Take 1 tablet (75 mg) by mouth daily   diclofenac (VOLTAREN) 1 % GEL topical gel Unknown at Unknown time  No No   Sig: Apply 2 g topically 4 times daily to hands   dorzolamide (TRUSOPT) 2 % ophthalmic solution 5/31/2017 at 0800  Yes No   Sig: Place 1 drop into both eyes 2 times daily    estradiol (ESTRACE) 1 MG tablet 5/31/2017 at 0800  No No   Sig: Take 1 tablet (1 mg) by mouth daily   ferrous sulfate (IRON) 325 (65 FE) MG tablet 5/31/2017 at 0800  No No   Sig: Take 1 tablet (325 mg) by mouth 2 times daily With meals   fluticasone (FLONASE) 50 MCG/ACT nasal spray 5/31/2017 at 0800  Yes No   Sig: Spray 1 spray into both nostrils daily   hydrochlorothiazide (MICROZIDE) 12.5 MG capsule 5/31/2017 at 0800  No No   Sig: Take 1 capsule (12.5 mg) by mouth daily   Patient taking differently: Take 12.5 mg by mouth daily Hold for sbp<90   latanoprost (XALATAN) 0.005 % ophthalmic solution 5/30/2017 at 2000  No No   Sig: Place 1 drop into both eyes At Bedtime   levETIRAcetam  (KEPPRA) 500 MG tablet 5/31/2017 at 0800  No No   Sig: Take 1 tablet (500 mg) by mouth 2 times daily   lisinopril (PRINIVIL/ZESTRIL) 10 MG tablet 5/31/2017 at 0800  No No   Sig: Take 1 tablet (10 mg) by mouth daily   metoprolol (TOPROL-XL) 25 MG 24 hr tablet 5/31/2017 at 0800  No No   Sig: Take 1 tablet (25 mg) by mouth daily   nitroglycerin (NITROSTAT) 0.4 MG SL tablet Unknown at Unknown time  No No   Sig: Place 1 tablet (0.4 mg) under the tongue every 5 minutes as needed for chest pain if you are still having symptoms after 3 doses (15 minutes) call 911.   ondansetron (ZOFRAN-ODT) 4 MG ODT tab 5/31/2017 at 0800  No No   Sig: Take 1 tablet (4 mg) by mouth every 6 hours   Patient taking differently: Take 4 mg by mouth every 6 hours as needed    order for DME   No No   Sig: Equipment being ordered: Glucerna daily shakes with each meal   order for DME   No No   Sig: ACcu check BID   order for DME   No No   Sig: Equipment being ordered: Nebulizer and tubing supplies   order for DME   No No   Sig: Equipment being ordered: CPAP supplies mask, hose, filters, cushion    fax to [facility] at [phone]   order for DME   No No   Sig: Equipment being ordered: CPAP supplies mask, hose, filters, cushion fax to [facility] at [phone]  Disp: 10 Device Refills: prn   Class: Local Print Start: 2/10/2017   order for DME   No No   Sig: Equipment being ordered: wheelchair seat cushion   pantoprazole (PROTONIX) 40 MG EC tablet 5/31/2017 at 0800  Yes No   Sig: Take 40 mg by mouth daily   phenytoin 200 MG CAPS 5/31/2017 at 0800  No No   Sig: Take 200 mg by mouth 2 times daily   Patient taking differently: Take 200 mg by mouth 2 times daily (takes at 8 AM and 8 PM)   polyethylene glycol (MIRALAX/GLYCOLAX) Packet 5/31/2017 at 0800  Yes Yes   Sig: Take 17 g by mouth daily Dissolved in water or juice   pregabalin (LYRICA) 75 MG capsule 5/31/2017 at 0800  No No   Sig: Take 2 capsules (150 mg) by mouth 2 times daily    prochlorperazine (COMPAZINE) 10 MG tablet Unknown at Unknown time  No No   Sig: Take 1 tablet (10 mg) by mouth every 8 hours as needed   risperiDONE (RISPERDAL) 0.5 MG tablet 5/30/2017 at 2000  Yes No   Sig: Take 0.5 mg by mouth At Bedtime   sucralfate (CARAFATE) 1 GM tablet 5/31/2017 at 1200  No No   Sig: Take 1 tablet (1 g) by mouth 4 times daily May dissolve in 10 mL water is necessary. (Start upon completion of carafate suspension)   thin (NO BRAND SPECIFIED) lancets   No No   Sig: Use to test blood sugar 3 times daily or as directed.   traZODone (DESYREL) 100 MG tablet 5/30/2017 at 2000  No No   Sig: Take 1 tablet (100 mg) by mouth At Bedtime   zinc 50 MG TABS 5/31/2017 at 0800  Yes No   Sig: Take 1 tablet by mouth daily      Facility-Administered Medications: None     Allergies   Allergies   Allergen Reactions     Bee Venom      Penicillins Anaphylaxis     Other reaction(s): Skin Rash and/or Hives     Dilantin [Phenytoin] Other (See Comments)     Generic dilantin only per pt     Iodide Hives     09/11/15: Pt states she can use iodine and doesn't have any problems      Iodine-131      Novocaine [Procaine] Hives     Other reaction(s): Skin Rash and/or Hives     Physical Exam   Vital Signs: Temp: 97.3  F (36.3  C) Temp src: Oral BP: 149/65 Pulse: 98   Resp: 16 SpO2: 97 % O2 Device: None (Room air)    Weight: 133 lbs 0 oz    General Appearance: Awake, alert in NAD. Very pleasant and conversational  Eyes: anicteric, EOMI  HEENT: moist mucous membranes  Respiratory: clear to auscultation bilaterally without crackles  Cardiovascular: soft 1-2/6 systolic murmur, normal rate, regular rhythm  GI: soft, non-distended. Mildly tender to suprapubic palpation  Genitourinary: unable to visualize urethra, but appears to have normal vulvar anatomy based on history of gender reassignment surgery  Musculoskeletal: bilateral AKA  Neurologic: AOx4    Assessment & Plan   Sonya Foote is a 67 year old female with multiple medical  problems including CAD, s/p inferior MI with COLLEEN to pRCA & mRCA 9/17/2016, HTN, HLD, PVD s/p bilateral AKA,  h/o LLE blood clot prior to amputation, sickle cell trait, h/o anemia, COPD, asthma, GERD, dysphagia with history of esophageal stricture, s/p dilation, NIDDM, neuropathy, chronic low back pain with intrathecal pump, seizure disorder, h/o CVA s/p stent to left carotid,  depression/anxiety, schizoaffective disorder, and sexual reassignment surgery (male to female) who presents from her nursing home with fevers, lethargy, and decreased appetite over the last few days.    # Recurrent UTI's:  # Pyelonephritis with sepsis: patient well known to urology with known recurrent UTI'S. Presented with leukocytosis, febrile, AMS with UA notable for WBC's, leukocyte esterase positive. Normal Lactate and clear CXR. Patient is well known to the urology department as of 3/17 after asking to be evaluated for catheterization placement due to history of recurrent UTI. Reports that she is unable to get to the toilet consistently at her facilities due to her AKA's so she tries to hold it and consequently gets frequent UTI's (possibly with predisposing anatomy with her neovagina and reconstructed urethra). She was last seen on 5/18 where it was agreed that she may undergo suprapubic catheter placement in the future as there is a safe place for her based on the CT examination.  -- continue levofloxacin 750 mg daily for likely 5 day course. (5/31-6/4)  -- s/p 1L bolus, continue mIVF at 75 ml/hr  -- repeat CBC in the am  -- vitals q4h  -- I/O  -- holding home antihypertensives until tomorrow (metoprolol, lisinopril, hydrochlorothiazide)  -- will discuss with Urology in the am    # Constipation: no bowel movement in 3 days. Possible contributor to recurrent UTI's.  -- senna docusates, miralax  -- can consider an enema    #. Normocytic Anemia  Patient has sickle cell trait. No signs of bleeding. Hgb is stable. Patient is on ferrous  sulfate and Vitamin B12.   -- hold ferrous sulfate while infected.      #. Chronic Medical Problems  -- CAD s/p COLLEEN: continue ASA and clopidogrel  -- CVA with seizure d/o: continue keppra and phenytoin  -- DMII: continue gabapentin, lyrica  -- COPD: continue advair, spiriva and albuterol  -- HFpEF: hold home BB, ACE, restart when infection under better control.   -- HTN: hold home BP meds as above, restart when infection under better control.   -- HLD: continue atorvastatin.   -- GERD: continue pantoprazole, sucralfate  -- Schizoaffective D/o: continue escitalopram, diazepam and risperidone.   -- Insomnia: continue trazodone  -- opthalmic: continue eye drops  -- RLS: continue ropinirole  -- Allergies: continue cetirizine and fluticasone    Diet: Combination Diet Regular Diet Adult  Fluids: mIVF with NS at 75 ml/hr  DVT Prophylaxis: on aspirin, plavix  Code Status: Full Code    Disposition Plan   Expected discharge: 2 - 3 days; recommended to transitional care unit once infection is adequately treated    The patient was discussed with Dr. Haris Alexandra MD  Medicine-Pediatrics PGY2  [facility] - [phone]    Data   Data     Recent Labs  Lab 05/31/17  1024   WBC 21.7*   HGB 11.0*   MCV 91      INR 1.16*      POTASSIUM 3.3*   CHLORIDE 106   CO2 21   BUN 14   CR 0.75   ANIONGAP 10   CAROL 8.7   *     Recent Results (from the past 24 hour(s))   XR Chest 1 View    Narrative    Exam: XR CHEST 1 VW, 5/31/2017 11:35 AM    Indication: Shortness of breath, fevers.    Comparison: CT chest 4/19/2017 and radiograph of the chest 4/19/2017.    Findings:   Trachea is midline. Pulmonary vasculature is distinct. Cardiac  silhouette is within normal limits. There are bibasilar opacities.  Multiple calcified granulomas seen in both lungs. Biapical cystic  changes. No significant pneumothorax or pleural effusion. Upper  abdomen is unremarkable.      Impression    Impression: No acute cardiopulmonary  abnormality.    I have personally reviewed the examination and initial interpretation  and I agree with the findings.    ALLISON MAIER MD           Physician Attestation   I, Pascual Carballo, saw this patient with the resident and agree with the resident s findings and plan of care as documented in the resident s note.      I personally reviewed vital signs, medications, labs and imaging.    Pascual Carballo  Date of Service (when I saw the patient): 5/31/17

## 2017-05-31 NOTE — ED NOTES
Pt arrives from care facility with generalized weakness, not eating well. No BM for 3 days. Fever of 102.2 Was given Tylenol prior to coming in. VSS

## 2017-05-31 NOTE — PHARMACY-ADMISSION MEDICATION HISTORY
Admission medication history interview status for the 5/31/2017 admission is complete. See Epic admission navigator for allergy information, pharmacy, prior to admission medications and immunization status.     Medication history interview sources:  MAR from [facility] ([phone])    Changes made to PTA medication list (reason)  Added: Miralax, acetaminophen  Deleted: albuterol inhaler, cyclobenzaprine, lidoderm patches  Changed: none    Additional medication history information (including reliability of information, actions taken by pharmacist): See 5/19 outpatient pain service note regarding current intrathecal infusion dose, per [facility] this dose is still accurate.       Prior to Admission medications    Medication Sig Last Dose Taking? Auth Provider   polyethylene glycol (MIRALAX/GLYCOLAX) Packet Take 17 g by mouth daily Dissolved in water or juice 5/31/2017 at 0800 Yes Unknown, Entered By History   ESCITALOPRAM OXALATE PO Take 10 mg by mouth daily 5/31/2017 at 0800 Yes Unknown, Entered By History   ACETAMINOPHEN PO Take 1,000 mg by mouth every 6 hours as needed for fever Taking q4-6 hours, per MAR 5/30/2017 at 2000 Yes Unknown, Entered By History   pregabalin (LYRICA) 75 MG capsule Take 2 capsules (150 mg) by mouth 2 times daily 5/31/2017 at 0800  Allan Casey MD   metoprolol (TOPROL-XL) 25 MG 24 hr tablet Take 1 tablet (25 mg) by mouth daily 5/31/2017 at 0800  Allan Casey MD   cetirizine (ZYRTEC) 10 MG tablet Take 1 tablet (10 mg) by mouth daily as needed for allergies 5/31/2017 at 0800  Allan Casey MD   diclofenac (VOLTAREN) 1 % GEL topical gel Apply 2 g topically 4 times daily to hands Unknown at Unknown time  Allan Casey MD   EPINEPHrine (EPIPEN JR) 0.15 MG/0.3ML injection Inject 0.3 mLs (0.15 mg) into the muscle as needed for anaphylaxis   Allan Casey MD   lisinopril (PRINIVIL/ZESTRIL) 10 MG tablet Take 1 tablet (10 mg) by mouth daily 5/31/2017 at 0800  Monica  Bj RIVERA MD   pantoprazole (PROTONIX) 40 MG EC tablet Take 40 mg by mouth daily 5/31/2017 at 0800  Reported, Patient   risperiDONE (RISPERDAL) 0.5 MG tablet Take 0.5 mg by mouth At Bedtime 5/30/2017 at 2000  Reported, Patient   ferrous sulfate (IRON) 325 (65 FE) MG tablet Take 1 tablet (325 mg) by mouth 2 times daily With meals 5/31/2017 at 0800  Allan Casey MD   blood glucose monitoring (ONE TOUCH ULTRA) test strip Use to test blood sugars 3 times daily or as directed.   Allan Casey MD   order for DME Equipment being ordered: wheelchair seat cushion   Allan Casey MD   order for DME Equipment being ordered: CPAP supplies mask, hose, filters, cushion    fax to [facility] at [phone]   Allan Casey MD   sucralfate (CARAFATE) 1 GM tablet Take 1 tablet (1 g) by mouth 4 times daily May dissolve in 10 mL water is necessary. (Start upon completion of carafate suspension) 5/31/2017 at 1200  Allan Casey MD   order for DME Equipment being ordered: CPAP supplies mask, hose, filters, cushion fax to [facility] at [phone]  Disp: 10 Device Refills: prn   Class: Local Print Start: 2/10/2017   Allan Casey MD   order for DME Equipment being ordered: Nebulizer and tubing supplies   Alina Jennings MD   albuterol (2.5 MG/3ML) 0.083% neb solution INHALE 1 VIAL VIA NEBULIZER EVERY 6 HOURS AS NEEDED Unknown at Unknown time  Allan Casey MD   order for DME ACcu check BID   Allan Casey MD   ondansetron (ZOFRAN-ODT) 4 MG ODT tab Take 1 tablet (4 mg) by mouth every 6 hours  Patient taking differently: Take 4 mg by mouth every 6 hours as needed  5/31/2017 at 0800  Yasir Montoya MD   CYANOCOBALAMIN PO Take 2,000 mcg by mouth daily 5/31/2017 at 0800  Reported, Patient   Nutritional Supplements (RENÉE) PACK Take 1 packet by mouth 2 times daily Unknown at Unknown time  Reported, Patient   HYDROmorphone (DILAUDID) 2 MG tablet Take 1 tablet (2 mg) by mouth every 3 hours  as needed for moderate to severe pain 5/31/2017 at 0230  Sintia Padgett APRN CNP   fluticasone (FLONASE) 50 MCG/ACT nasal spray Spray 1 spray into both nostrils daily 5/31/2017 at 0800  Reported, Patient   ADVAIR DISKUS 250-50 MCG/DOSE diskus inhaler Inhale 1 puff into the lungs 2 times daily  5/31/2017 at 0800  Reported, Patient   calcium citrate-vitamin D (CITRACAL) 315-250 MG-UNIT TABS Take 2 tablets by mouth daily 5/31/2017 at 0800  Allan Casey MD   hydrochlorothiazide (MICROZIDE) 12.5 MG capsule Take 1 capsule (12.5 mg) by mouth daily  Patient taking differently: Take 12.5 mg by mouth daily Hold for sbp<90 5/31/2017 at 0800  Allan Casey MD   estradiol (ESTRACE) 1 MG tablet Take 1 tablet (1 mg) by mouth daily 5/31/2017 at 0800  Allan Casey MD   levETIRAcetam (KEPPRA) 500 MG tablet Take 1 tablet (500 mg) by mouth 2 times daily 5/31/2017 at 0800  Allan Casey MD   traZODone (DESYREL) 100 MG tablet Take 1 tablet (100 mg) by mouth At Bedtime 5/30/2017 at 2000  Allan Casey MD   aspirin EC 81 MG EC tablet Take 1 tablet (81 mg) by mouth daily  Patient taking differently: Take 81 mg by mouth every morning  5/31/2017 at 0800  Daria Mancilla MD   clopidogrel (PLAVIX) 75 MG tablet Take 1 tablet (75 mg) by mouth daily 5/31/2017 at 0800  Daria Mancilla MD   blood glucose monitoring (ONE TOUCH ULTRA 2) meter device kit Use to test blood sugars 3 times daily or as directed.   Allan Casey MD   blood glucose monitoring (ONE TOUCH ULTRASOFT) lancets Use to test blood sugar 3 times daily or as directed.   Allan Casey MD   phenytoin 200 MG CAPS Take 200 mg by mouth 2 times daily  Patient taking differently: Take 200 mg by mouth 2 times daily (takes at 8 AM and 8 PM) 5/31/2017 at 0800  Noah Blum MD   dorzolamide (TRUSOPT) 2 % ophthalmic solution Place 1 drop into both eyes 2 times daily  5/31/2017 at 0800  Reported, Patient   SPIRIVA HANDIHALER 18 MCG  inhalation capsule INHALE THE CONTENTS OF 1 CAPSULE VIA INHALATION DEVICE DAILY 5/31/2017 at 0800  Alina Jennings MD   zinc 50 MG TABS Take 1 tablet by mouth daily 5/31/2017 at 0800  Reported, Patient   nitroglycerin (NITROSTAT) 0.4 MG SL tablet Place 1 tablet (0.4 mg) under the tongue every 5 minutes as needed for chest pain if you are still having symptoms after 3 doses (15 minutes) call 911. Unknown at Unknown time  Allan Casey MD   MEDICATION GIVEN BY INTRATHECAL PUMP - INSTRUCTION continuous May 19, 2017 - per [facility] in [place] [phone]:  Conc: Bupivacaine 20 mg/mL and Fentanyl 2000 mcg/mL.  Continuous: Bupivacaine 7.265 mg/day and Fentanyl 726.5 mcg/day.  Boluses: Up to 7 boluses per 24-hr period of Bupivicaine 0.599 mg and Fentanyl 59.9 mcg    Pump Refill Date at [facility]: 7/2/17   Unknown, Entered By History   latanoprost (XALATAN) 0.005 % ophthalmic solution Place 1 drop into both eyes At Bedtime 5/30/2017 at 2000  Kendrick Wells MD   atorvastatin (LIPITOR) 40 MG tablet Take 1 tablet (40 mg) by mouth daily  Patient taking differently: Take 40 mg by mouth At Bedtime  5/30/2017 at 2000  Allan Casey MD   order for DME Equipment being ordered: Glucerna daily shakes with each meal   Allan Casey MD   prochlorperazine (COMPAZINE) 10 MG tablet Take 1 tablet (10 mg) by mouth every 8 hours as needed Unknown at Unknown time  Allan Casey MD   thin (NO BRAND SPECIFIED) lancets Use to test blood sugar 3 times daily or as directed.   Allan Casey MD   ORDER FOR DME Equipment being ordered: Nebulizer and supplies   Alina Jennings MD   ORDER FOR DME Equipment being ordered: Power Wheelchair   Allan Casey MD   ORDER FOR DME Equipment being ordered: Depends briefs   Allan Casey MD   EASY TOUCH LANCETS 30G/TWIST MISC    Reported, Patient   Cholecalciferol (VITAMIN D) 2000 UNITS tablet Take 2,000 Units by mouth  daily. 5/31/2017 at 0800  Reported, Patient   Multiple Vitamin (MULTIVITAMIN OR) Take 1 tablet by mouth daily  5/31/2017 at 0800  Reported, Patient         Medication history completed by: shahzad BlancoD

## 2017-05-31 NOTE — IP AVS SNAPSHOT
[address] [address]    Phone:  [phone]                                       After Visit Summary   5/31/2017    Sonya Foote    MRN: [id number]           After Visit Summary Signature Page     I have received my discharge instructions, and my questions have been answered. I have discussed any challenges I see with this plan with the nurse or doctor.    ..........................................................................................................................................  Patient/Patient Representative Signature      ..........................................................................................................................................  Patient Representative Print Name and Relationship to Patient    ..................................................               ................................................  Date                                            Time    ..........................................................................................................................................  Reviewed by Signature/Title    ...................................................              ..............................................  Date                                                            Time

## 2017-05-31 NOTE — PROGRESS NOTES
5/30/17: CM received call from [facility] PT, Vasu Arceo at [phone], inquiring when toilet safety frame would be delivered.   ELVIRA sent Nalini at [facility] email to f/u.     Jamie Sharma RN, PHN  [facility]

## 2017-05-31 NOTE — ED PROVIDER NOTES
History     Chief Complaint   Patient presents with     Fever     HPI  Sonya Foote is a 67 year old female with multiple medical problems including CAD, s/p inferior MI with COLLEEN to pRCA & mRCA 9/17/2016, HTN, HLD, PVD, s/p bilateral AKA,  h/o LLE blood clot prior to amputation, sickle cell trait, h/o anemia, COPD, asthma, GERD, dysphagia with history of esophageal stricture, s/p dilation, NIDDM, neuropathy, chronic low back pain with intrathecal pump, seizure disorder, h/o CVA, s/p stent to left carotid,  depression/anxiety, schizoaffective disorder, and sexual reassignment surgery (male to female) who presents from her nursing home with fevers, lethargy, and decreased appetite over the last few days.    History difficult to obtain from patient due to lethargy.  She states she has not felt well over the last 2-3 days.  She has felt weak, tired, hot, and thirsty and has had decreased appetite.  On ROS, patient states she is urinating frequently and having dysuria.  She also admits to feeling thirsty and drinking frequently.  ED admission notes states patient had a temperature of 102.2 prior to being brought in today.    I have reviewed the Medications, Allergies, Past Medical and Surgical History, and Social History in the Epic system.    Review of Systems   Constitutional: Positive for activity change, appetite change and fever. Negative for chills.   HENT: Negative for congestion.    Respiratory: Positive for shortness of breath. Negative for cough and wheezing.    Cardiovascular: Positive for palpitations. Negative for chest pain.   Gastrointestinal: Negative for abdominal pain, constipation, nausea and vomiting.   Endocrine: Positive for polydipsia and polyuria.   Genitourinary: Positive for decreased urine volume, dysuria and frequency. Negative for hematuria.   Skin: Negative for rash.   Neurological: Positive for weakness. Negative for dizziness, light-headedness and headaches.   Psychiatric/Behavioral:  "Negative for dysphoric mood. The patient is not nervous/anxious.        Physical Exam   BP: 94/53  Pulse: 84  Temp: 99  F (37.2  C)  Resp: 15  Height: 170.2 cm (5' 7\")  Weight: 60.3 kg (133 lb)  SpO2: 96 %  Physical Exam   Constitutional: She is oriented to person, place, and time. She appears well-developed. No distress.   HENT:   Head: Normocephalic.   Mouth/Throat: Mucous membranes are dry. No oropharyngeal exudate.   Eyes: Conjunctivae are normal.   Neck: Normal range of motion. Neck supple.   Cardiovascular: Normal rate.    No murmur heard.  Pulmonary/Chest: Effort normal and breath sounds normal. No respiratory distress. She has no wheezes. She has no rales.   Abdominal: Soft. Bowel sounds are normal. She exhibits no distension. There is no tenderness. There is no rebound.   Musculoskeletal: She exhibits no edema or tenderness.   Bilateral AKAs with well healed scars, no skin break down   Neurological: She is alert and oriented to person, place, and time.   Skin: Skin is warm.   Psychiatric: She has a normal mood and affect. Her behavior is normal. Thought content normal.   Nursing note and vitals reviewed.      ED Course     ED Course     Procedures            In ED CXR, UA with micro, urine culture, CBC, BMP, lactic acid, and blood cultures obtained.  CXR (preliminary reading) did not show any infiltrates.  Lactic acid was normal.   UA showed large LE and WBC.  Patient given 1 liter IVF and given 1 dose of IV Levaquin.    Medications   0.9% sodium chloride BOLUS (0 mLs Intravenous Stopped 5/31/17 1221)     Followed by   0.9% sodium chloride infusion (not administered)   levofloxacin (LEVAQUIN) infusion 750 mg (750 mg Intravenous New Bag 5/31/17 1246)   dextrose 5% and 0.45% NaCl infusion ( Intravenous New Bag 5/31/17 1248)         Assessments & Plan (with Medical Decision Making)   Patient is a 67 year old female with multiple medical problems that presents from her nursing home with fever and lethargy over " the last few days.  With elevated temperature, WBC of 21.7, and positive UA, patient most likely has a UTI and/or pyelonephritis.  Furthermore, patient has a history of surgery on her urethra and diabetes mellitus.  Differential also includes cellulitis, a wound infection, or DVT, but these causes are less likely since physical exam does not support these findings.  CXR showed no focal consolidation.  Patient was given 1 liter of IVFs in ED and received 1 dose of Levaquin (PCN allergy) for a UTI.  She is currently weak and unable to tolerate oral intake.   Will plan to admit for further evaluation and treatment due to patient's weakness, lethargy, and inability to care for herself at this time.    I have reviewed the nursing notes.    I have reviewed the findings, diagnosis, plan and need for follow up with the patient.  This data collected with the Resident working in the Emergency Department.  Patient was seen and evaluated by myself and I repeated the history and physical exam with the patient.  The plan of care was discussed with them.  The key portions of the note including the entire assessment and plan reflect my documentation.  67 year-old female in assisted living now with weakness fatigue documented fevers soft blood pressure and urinary tract infection.  She has been cultured, antibiotics started, her pressure responded to IV fluids.  She is stable and will be admitted to the medicine service for a complicated urinary tract infection/pyelonephritis.  Xamination:  Gen.: S voice  Cardiac: Regular rahythm  Respiratory: Lungs clear  Abdomen: Nontender  Extremities: No evidence for stump infection      Current Discharge Medication List          Final diagnoses:   Acute pyelonephritis   Fever, unspecified   Weakness generalized   Fatigue, unspecified type   Poor appetite       5/31/2017   [facility], [place], EMERGENCY DEPARTMENT    Mary Waller M.D.  PGY-2, Family Medicine       Bj Kenyon,  MD  05/31/17 0961

## 2017-05-31 NOTE — TELEPHONE ENCOUNTER
Prior authorization    Medication name \[facility] [place] [facility]\""  Insurance medica  Insurance ID number [id number]  Prior authorization faxed through cover my meds

## 2017-05-31 NOTE — PROGRESS NOTES
5/31/17: No email from Nalini at [facility].  ELVIRA placed call to [facility] - spoke with Lupe - they are waiting on [employer] auth confirmation - Lupe stated they would put rush on it.     ELVIRA left vm for JALEEL Leone at [facility] relaying information.     Jamie Sharma, RN, PHN  [employer]

## 2017-06-01 ENCOUNTER — TELEPHONE (OUTPATIENT)
Dept: NURSING | Facility: CLINIC | Age: 67
End: 2017-06-01

## 2017-06-01 LAB
ANION GAP SERPL CALCULATED.3IONS-SCNC: 7 MMOL/L (ref 3–14)
BUN SERPL-MCNC: 7 MG/DL (ref 7–30)
CALCIUM SERPL-MCNC: 8 MG/DL (ref 8.5–10.1)
CHLORIDE SERPL-SCNC: 107 MMOL/L (ref 94–109)
CO2 SERPL-SCNC: 23 MMOL/L (ref 20–32)
CREAT SERPL-MCNC: 0.66 MG/DL (ref 0.52–1.04)
ERYTHROCYTE [DISTWIDTH] IN BLOOD BY AUTOMATED COUNT: 14.3 % (ref 10–15)
GFR SERPL CREATININE-BSD FRML MDRD: 89 ML/MIN/1.7M2
GLUCOSE SERPL-MCNC: 104 MG/DL (ref 70–99)
HCT VFR BLD AUTO: 31.4 % (ref 35–47)
HGB BLD-MCNC: 10 G/DL (ref 11.7–15.7)
LACTATE BLD-SCNC: 0.7 MMOL/L (ref 0.7–2.1)
MCH RBC QN AUTO: 29.4 PG (ref 26.5–33)
MCHC RBC AUTO-ENTMCNC: 31.8 G/DL (ref 31.5–36.5)
MCV RBC AUTO: 92 FL (ref 78–100)
PLATELET # BLD AUTO: 191 10E9/L (ref 150–450)
POTASSIUM SERPL-SCNC: 3.8 MMOL/L (ref 3.4–5.3)
RBC # BLD AUTO: 3.4 10E12/L (ref 3.8–5.2)
SODIUM SERPL-SCNC: 138 MMOL/L (ref 133–144)
WBC # BLD AUTO: 12.7 10E9/L (ref 4–11)

## 2017-06-01 PROCEDURE — 83605 ASSAY OF LACTIC ACID: CPT | Performed by: INTERNAL MEDICINE

## 2017-06-01 PROCEDURE — 00000146 ZZHCL STATISTIC GLUCOSE BY METER IP

## 2017-06-01 PROCEDURE — 85027 COMPLETE CBC AUTOMATED: CPT | Performed by: INTERNAL MEDICINE

## 2017-06-01 PROCEDURE — 80048 BASIC METABOLIC PNL TOTAL CA: CPT | Performed by: INTERNAL MEDICINE

## 2017-06-01 PROCEDURE — 25000128 H RX IP 250 OP 636: Performed by: INTERNAL MEDICINE

## 2017-06-01 PROCEDURE — 40000556 ZZH STATISTIC PERIPHERAL IV START W US GUIDANCE

## 2017-06-01 PROCEDURE — 36415 COLL VENOUS BLD VENIPUNCTURE: CPT | Performed by: INTERNAL MEDICINE

## 2017-06-01 PROCEDURE — 25000128 H RX IP 250 OP 636

## 2017-06-01 PROCEDURE — 99232 SBSQ HOSP IP/OBS MODERATE 35: CPT | Mod: GC | Performed by: INTERNAL MEDICINE

## 2017-06-01 PROCEDURE — 25000132 ZZH RX MED GY IP 250 OP 250 PS 637: Performed by: INTERNAL MEDICINE

## 2017-06-01 PROCEDURE — 12000001 ZZH R&B MED SURG/OB UMMC

## 2017-06-01 PROCEDURE — 87040 BLOOD CULTURE FOR BACTERIA: CPT | Performed by: INTERNAL MEDICINE

## 2017-06-01 RX ORDER — VANCOMYCIN HYDROCHLORIDE 1 G/200ML
1000 INJECTION, SOLUTION INTRAVENOUS EVERY 12 HOURS
Status: DISCONTINUED | OUTPATIENT
Start: 2017-06-01 | End: 2017-06-02

## 2017-06-01 RX ORDER — LIDOCAINE 50 MG/G
1 PATCH TOPICAL
Status: DISCONTINUED | OUTPATIENT
Start: 2017-06-01 | End: 2017-06-02 | Stop reason: HOSPADM

## 2017-06-01 RX ADMIN — PREGABALIN 150 MG: 75 CAPSULE ORAL at 08:37

## 2017-06-01 RX ADMIN — SODIUM CHLORIDE 1000 ML: 9 INJECTION, SOLUTION INTRAVENOUS at 18:30

## 2017-06-01 RX ADMIN — ZINC SULFATE CAP 220 MG (50 MG ELEMENTAL ZN) 220 MG: 220 (50 ZN) CAP at 08:39

## 2017-06-01 RX ADMIN — SENNOSIDES AND DOCUSATE SODIUM 2 TABLET: 8.6; 5 TABLET ORAL at 08:39

## 2017-06-01 RX ADMIN — Medication 2 TABLET: at 08:38

## 2017-06-01 RX ADMIN — VANCOMYCIN HYDROCHLORIDE 1000 MG: 1 INJECTION, SOLUTION INTRAVENOUS at 18:23

## 2017-06-01 RX ADMIN — ESTRADIOL 1 MG: 1 TABLET ORAL at 08:38

## 2017-06-01 RX ADMIN — LIDOCAINE 1 PATCH: 50 PATCH CUTANEOUS at 21:20

## 2017-06-01 RX ADMIN — DICLOFENAC 2 G: 10 GEL TOPICAL at 15:58

## 2017-06-01 RX ADMIN — PHENYTOIN SODIUM 200 MG: 100 CAPSULE ORAL at 08:38

## 2017-06-01 RX ADMIN — SUCRALFATE 1 G: 1 TABLET ORAL at 13:11

## 2017-06-01 RX ADMIN — FLUTICASONE FUROATE AND VILANTEROL TRIFENATATE 1 PUFF: 100; 25 POWDER RESPIRATORY (INHALATION) at 08:41

## 2017-06-01 RX ADMIN — DICLOFENAC 2 G: 10 GEL TOPICAL at 21:19

## 2017-06-01 RX ADMIN — TRAZODONE HYDROCHLORIDE 100 MG: 100 TABLET ORAL at 22:01

## 2017-06-01 RX ADMIN — RISPERIDONE 0.5 MG: 0.5 TABLET ORAL at 22:01

## 2017-06-01 RX ADMIN — LEVETIRACETAM 500 MG: 500 TABLET, FILM COATED ORAL at 21:19

## 2017-06-01 RX ADMIN — ACETAMINOPHEN 500 MG: 500 TABLET, FILM COATED ORAL at 05:41

## 2017-06-01 RX ADMIN — ESCITALOPRAM OXALATE 10 MG: 10 TABLET ORAL at 08:38

## 2017-06-01 RX ADMIN — POLYETHYLENE GLYCOL 3350 17 G: 17 POWDER, FOR SOLUTION ORAL at 08:39

## 2017-06-01 RX ADMIN — SODIUM CHLORIDE 1000 ML: 9 INJECTION, SOLUTION INTRAVENOUS at 02:33

## 2017-06-01 RX ADMIN — FLUTICASONE PROPIONATE 1 SPRAY: 50 SPRAY, METERED NASAL at 08:40

## 2017-06-01 RX ADMIN — ATORVASTATIN CALCIUM 40 MG: 40 TABLET, FILM COATED ORAL at 22:01

## 2017-06-01 RX ADMIN — LEVOFLOXACIN 750 MG: 500 TABLET, FILM COATED ORAL at 08:39

## 2017-06-01 RX ADMIN — PHENYTOIN SODIUM 200 MG: 100 CAPSULE ORAL at 21:09

## 2017-06-01 RX ADMIN — LATANOPROST 1 DROP: 50 SOLUTION/ DROPS OPHTHALMIC at 22:03

## 2017-06-01 RX ADMIN — DICLOFENAC 2 G: 10 GEL TOPICAL at 13:11

## 2017-06-01 RX ADMIN — GENTAMICIN SULFATE 420 MG: 40 INJECTION, SOLUTION INTRAMUSCULAR; INTRAVENOUS at 08:49

## 2017-06-01 RX ADMIN — HYDROMORPHONE HYDROCHLORIDE 2 MG: 2 TABLET ORAL at 17:00

## 2017-06-01 RX ADMIN — UMECLIDINIUM 62.5 MCG: 62.5 AEROSOL, POWDER ORAL at 08:41

## 2017-06-01 RX ADMIN — HYDROMORPHONE HYDROCHLORIDE 2 MG: 2 TABLET ORAL at 03:54

## 2017-06-01 RX ADMIN — SUCRALFATE 1 G: 1 TABLET ORAL at 08:38

## 2017-06-01 RX ADMIN — PANTOPRAZOLE SODIUM 40 MG: 40 TABLET, DELAYED RELEASE ORAL at 08:39

## 2017-06-01 RX ADMIN — CLOPIDOGREL 75 MG: 75 TABLET, FILM COATED ORAL at 08:38

## 2017-06-01 RX ADMIN — SUCRALFATE 1 G: 1 TABLET ORAL at 15:57

## 2017-06-01 RX ADMIN — DICLOFENAC 2 G: 10 GEL TOPICAL at 08:41

## 2017-06-01 RX ADMIN — DORZOLAMIDE HYDROCHLORIDE 1 DROP: 20 SOLUTION/ DROPS OPHTHALMIC at 08:40

## 2017-06-01 RX ADMIN — LEVETIRACETAM 500 MG: 500 TABLET, FILM COATED ORAL at 08:38

## 2017-06-01 RX ADMIN — DORZOLAMIDE HYDROCHLORIDE 1 DROP: 20 SOLUTION/ DROPS OPHTHALMIC at 21:19

## 2017-06-01 RX ADMIN — HYDROMORPHONE HYDROCHLORIDE 2 MG: 2 TABLET ORAL at 21:08

## 2017-06-01 RX ADMIN — CYANOCOBALAMIN TAB 1000 MCG 2000 MCG: 1000 TAB at 09:21

## 2017-06-01 RX ADMIN — HYDROMORPHONE HYDROCHLORIDE 2 MG: 2 TABLET ORAL at 08:36

## 2017-06-01 RX ADMIN — HYDROMORPHONE HYDROCHLORIDE 2 MG: 2 TABLET ORAL at 12:24

## 2017-06-01 RX ADMIN — FERROUS SULFATE 325 MG: 325 TABLET, FILM COATED ORAL at 08:38

## 2017-06-01 RX ADMIN — PREGABALIN 150 MG: 75 CAPSULE ORAL at 21:08

## 2017-06-01 RX ADMIN — SUCRALFATE 1 G: 1 TABLET ORAL at 21:08

## 2017-06-01 RX ADMIN — VITAMIN D, TAB 1000IU (100/BT) 2000 UNITS: 25 TAB at 08:38

## 2017-06-01 RX ADMIN — ASPIRIN 81 MG: 81 TABLET, COATED ORAL at 09:21

## 2017-06-01 NOTE — PLAN OF CARE
Problem: Goal Outcome Summary  Goal: Goal Outcome Summary  Outcome: No Change  Febrile (TMAX 101.1 oral); PRN Tylenol 500 mg PO given and provider notified; blood cultures ordered. Septic protocol triggered with morning vital signs; STAT lactic acid pending. A&O. Weight shift assistance provided x1 staff. Bilateral AKA. Regular diet. No complaints of nausea, no emesis. NS infusing at 75 mL/hr to PIV. Incontinent of urine x1 and voided 450 mL's via bedpan. No stools. Complaints of generalized pain; PRN dilaudid 2 mg PO given; patient has pain pump in place for chronic back pain.

## 2017-06-01 NOTE — PLAN OF CARE
Problem: Goal Outcome Summary  Goal: Goal Outcome Summary  Outcome: No Change  Pt AOx4, VSS on RA, afebrile. Q2 turn/repo, pt able to independently positions self, too. C/o lower back pains. Receiving PRN dilaudid. Mostly continent of urine, intermittently incontinent. Pt had one large continent BM today after several days of reported constipation. PIV infiltrated 2x today, MD notified. Hesitent to place midline/PICC due to short term IV abx needs, however, if pt continues to infiltrate, than MD will consider. IVMF infusing NS 75 mL/hr. Regular diet, good appetite. Calls appropriately and makes needs known. Will continue POC.

## 2017-06-01 NOTE — PHARMACY-VANCOMYCIN DOSING SERVICE
Pharmacy Vancomycin Initial Note  Date of Service 2017  Patient's  1950  67 year old, female    Indication: Pyelonephritis. Hx of Enterococcal UTI in pt with PCN allergy    Current estimated CrCl = Estimated Creatinine Clearance: 79.6 mL/min (based on Cr of 0.66).    Creatinine for last 3 days  2017: 10:24 AM Creatinine 0.75 mg/dL  2017:  5:51 AM Creatinine 0.66 mg/dL    Recent Vancomycin Level(s) for last 3 days  No results found for requested labs within last 72 hours.      Vancomycin IV Administrations (past 72 hours)      No vancomycin orders with administrations in past 72 hours.                Nephrotoxins and other renal medications (Future)    Start     Dose/Rate Route Frequency Ordered Stop    17 1700  vancomycin (VANCOCIN) 1000 mg in dextrose 5% 200 mL PREMIX      1,000 mg Intravenous EVERY 12 HOURS 17 1658            Contrast Orders - past 72 hours     None                Plan:  1.  Start vancomycin  1000 mg IV q12hrs.   2.  Goal Trough Level: 10-15 mg/L   3.  Pharmacy will check trough levels as appropriate in 1-3 Days.    4. Serum creatinine levels will be ordered a minimum of twice weekly.    5. Crestline method utilized to dose vancomycin therapy: Method 1    Thanks for the consult  Kalyan Howard, Pharm.D, BCPS

## 2017-06-01 NOTE — PROGRESS NOTES
Care Coordinator Progress Note     Admission Date/Time:  5/31/2017  Attending MD:  Pascual Carballo MD     Data  Chart reviewed, discussed with interdisciplinary team.   Patient was admitted for:    Acute pyelonephritis  Fever, unspecified  Weakness generalized  Fatigue, unspecified type  Poor appetite  Coronary artery disease of native artery of native heart with stable angina pectoris (H)  Diabetes mellitus type 2 without retinopathy (H)  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 (H).    Concerns with insurance coverage for discharge needs: None.  Current Living Situation: Patient lives in an assisted living facility.  Support System: Supportive and Involved  Services Involved: Assisted Living, DME and Home Care  Transportation: Van, wheelchair accessible  Barriers to Discharge: chronically ill and physical impairment    Coordination of Care and Referrals: Provided patient/family with options for DME and Home Care.        Assessment  Patient lives with wife at [facility] (ph: [phone], fax: [phone]).  Patient currently has [facility] for PT.  Resumption orders placed.  Upon chart review, patient has a  through her insurance, Fernando Sharma (phone: [phone]).  Awaiting PT evaluation for discharge recommendations.  Will need transportation arranged upon discharge.      Plan  Anticipated Discharge Date:  Awaiting PT eval  Anticipated Discharge Plan:  Back to [facility]    Vida Wheeler RN, BSN  Care Coordinator [facility] [address]  Pager: [phone]  Phone: [phone]

## 2017-06-01 NOTE — PROGRESS NOTES
[facility], [place]  Internal Medicine Progress Note - [facility] Service    Main Plans for Today   Discussed with urology - holding off on suprapubic catheterization until off plavix/asa  Added on gentamicin to levofloxacin and will continue until urine suscepibilities result     Assessment & Plan   Sonya Foote is a 67 year old female with multiple medical problems including CAD, s/p inferior MI with COLLEEN to pRCA & mRCA 9/17/2016, HTN, HLD, PVD s/p bilateral AKA,  h/o LLE blood clot prior to amputation, sickle cell trait, h/o anemia, COPD, asthma, GERD, dysphagia with history of esophageal stricture, s/p dilation, NIDDM, neuropathy, chronic low back pain with intrathecal pump, seizure disorder, h/o CVA s/p stent to left carotid,  depression/anxiety, schizoaffective disorder, and sexual reassignment surgery (male to female) who presents from her nursing home with fevers, lethargy, and decreased appetite over the last few days, found to have UTI. Improving today without fevers on gentamicin and levofloxacin     # Recurrent UTI's:  # Pyelonephritis with sepsis: patient well known to urology with known recurrent UTI'S. Presented with leukocytosis, febrile, AMS with UA notable for WBC's, leukocyte esterase positive. Normal Lactate and clear CXR. Patient is well known to the urology department as of 3/17 after asking to be evaluated for catheterization placement due to history of recurrent UTI. Reports that she is unable to get to the toilet consistently at her facilities due to her AKA's so she tries to hold it and consequently gets frequent UTI's (possibly with predisposing anatomy with her neovagina and reconstructed urethra). She was last seen on 5/18 where it was agreed that she may undergo suprapubic catheter placement in the future as there is a safe place for her based on the CT examination. WBC corrected nicely overnight from 20 -> 12.7.  -- Discussed with urology - holding off on  suprapubic catheterization until off plavix/asa - likely in September (1 year post stent)  -- Added on gentamicin to levofloxacin and will continue until urine suscepibilities result (will consider 6/1 first day of treatment based on fever overnight on levo)  -- s/p 1L bolus, continue mIVF at 75 ml/hr  -- repeat CBC in the am  -- vitals q4h  -- I/O  -- holding home antihypertensives for one more day, can restart tomorrow.(metoprolol, lisinopril, hydrochlorothiazide)     # Constipation: no bowel movement in 3 days. Possible contributor to recurrent UTI's. Able to have stool today.  -- senna docusates, miralax  -- can consider an enema     #. Normocytic Anemia  Patient has sickle cell trait. No signs of bleeding. Hgb is stable. Patient is on ferrous sulfate and Vitamin B12.   -- will hold ferrous sulfate while infected.      #. Chronic Medical Problems  -- CAD s/p COLLEEN: continue ASA and clopidogrel  -- CVA with seizure d/o: continue keppra and phenytoin  -- DMII: continue gabapentin, lyrica  -- COPD: continue advair, spiriva and albuterol  -- HFpEF: hold home BB, ACE, restart when infection under better control.   -- HTN: hold home BP meds as above, restart when infection under better control.   -- HLD: continue atorvastatin.   -- GERD: continue pantoprazole, sucralfate  -- Schizoaffective D/o: continue escitalopram, diazepam and risperidone.   -- Insomnia: continue trazodone  -- opthalmic: continue eye drops  -- RLS: continue ropinirole  -- Allergies: continue cetirizine and fluticasone     Diet: Combination Diet Regular Diet Adult  Fluids: mIVF with NS at 75 ml/hr  DVT Prophylaxis: on aspirin, plavix  Code Status: Full Code     Disposition Plan     Expected discharge: 2 - 3 days; recommended to transitional care unit once infection is adequately treated. Also pending PT/OT evaluation     The patient was discussed with Dr. Haris Alexandra MD  Medicine-Pediatrics PGY2  Eve 5 - [phone]    Interval  History   Did have a fever overnight. Broadened a bit to gentamicin. Otherwise doing well    Physical Exam   Vital Signs: Temp: 98.8  F (37.1  C) Temp src: Oral BP: 125/69 Pulse: 86   Resp: 18 SpO2: 98 % O2 Device: None (Room air)    Weight: 136 lbs 3.91 oz  General Appearance: Awake, alert in NAD. Very pleasant and conversational  Eyes: anicteric, EOMI  HEENT: moist mucous membranes  Respiratory: clear to auscultation bilaterally without crackles  Cardiovascular: soft 1-2/6 systolic murmur, normal rate, regular rhythm  GI: soft, non-distended.   Musculoskeletal: bilateral AKA  Neurologic: AOx4    Data   Medications     Medication given by intrathecal pump: This is NOT an order to dispense medication. For information only.       NaCl 1,000 mL (06/01/17 0815)       gentamicin  7 mg/kg Intravenous Q24H     senna-docusate  1-2 tablet Oral BID     polyethylene glycol  17 g Oral Daily     levofloxacin  750 mg Oral Daily     fluticasone-vilanterol  1 puff Inhalation Daily     aspirin  81 mg Oral QAM     atorvastatin  40 mg Oral At Bedtime     calcium citrate-vitamin D  2 tablet Oral Daily     cholecalciferol  2,000 Units Oral Daily     clopidogrel  75 mg Oral Daily     cyanocobalamin (vitamin  B-12) tablet 2,000 mcg  2,000 mcg Oral Daily     diclofenac  2 g Topical 4x Daily     dorzolamide  1 drop Both Eyes BID     escitalopram (LEXAPRO) tablet 10 mg  10 mg Oral Daily     estradiol  1 mg Oral Daily     fluticasone  1 spray Both Nostrils Daily     latanoprost  1 drop Both Eyes At Bedtime     levETIRAcetam  500 mg Oral BID     pantoprazole  40 mg Oral Daily     phenytoin  200 mg Oral BID     pregabalin  150 mg Oral BID     risperiDONE  0.5 mg Oral At Bedtime     umeclidinium  62.5 mcg Inhalation Daily     sucralfate  1 g Oral 4x Daily     traZODone  100 mg Oral At Bedtime     zinc sulfate  220 mg Oral Daily     Data     Recent Labs  Lab 06/01/17  0551 05/31/17  1024   WBC 12.7* 21.7*   HGB 10.0* 11.0*   MCV 92 91     209   INR  --  1.16*    137   POTASSIUM 3.8 3.3*   CHLORIDE 107 106   CO2 23 21   BUN 7 14   CR 0.66 0.75   ANIONGAP 7 10   CAROL 8.0* 8.7   * 102*     No results found for this or any previous visit (from the past 24 hour(s)).     Physician Attestation   I, Pascual Carballo, saw this patient with the resident and agree with the resident s findings and plan of care as documented in the resident s note.      I personally reviewed vital signs, medications, labs and imaging.      Pascual Carballo  Date of Service (when I saw the patient): 06/01/17

## 2017-06-01 NOTE — PLAN OF CARE
Problem: Goal Outcome Summary  Goal: Goal Outcome Summary  Outcome: No Change  Pt A&Ox4, VSS on RA. Pt complained of nausea and headache, tylenol and zofran given with relief. Pt has bilateral BKA, moves well with assist of 1, bed pan and briefs utilized. Pt had 1 urinary incontinence episode. Fair appetite. Pt likes meds crushed with applesauce. LPIV running NaCl @ 75hr. Will continue to monitor and follow POC.

## 2017-06-01 NOTE — PHARMACY-AMINOGLYCOSIDE DOSING SERVICE
Pharmacy Aminoglycoside Initial Note  Date of Service 2017  Patient's  1950  67 year old, female    Weight (Actual):  60.3 kg    Indication: Pyelonephritis    Current estimated CrCl = Estimated Creatinine Clearance: 68.3 mL/min (based on Cr of 0.75).    Creatinine for last 3 days  2017: 10:24 AM Creatinine 0.75 mg/dL     Nephrotoxins and other renal medications (Future)    Start     Dose/Rate Route Frequency Ordered Stop    17 0700  gentamicin (GARAMYCIN) 420 mg in NaCl 0.9 % 50 mL intermittent infusion      7 mg/kg × 60.3 kg  over 60 Minutes Intravenous EVERY 24 HOURS 17 0646            Contrast Orders - past 72 hours     None          Aminoglycoside Levels - past 2 days  No results found for requested labs within last 48 hours.    Aminoglycosides IV Administrations (past 72 hours)      No aminoglycosides orders with administrations in past 72 hours.                    Plan:  1.  Start Gentamicin 420 mg (7 mg/kg) IV q24h.   2.  Target goals based on extended interval dosing  3.  Goal peak level: 17-24 mg/L  4.  Goal trough level: <0.5mg/L for four hours before dose  5.  Pharmacy will continue to follow and check levels as appropriate in 1-3 Days      Lisandro Erickson

## 2017-06-01 NOTE — PROGRESS NOTES
[facility]  Patient is currently open to home care services with [facility].  The patient is currently receiving PT services, OT visits completed.  [facility]  and team have been notified of patient admission.  [facility] liaison will continue to follow patient during stay.  If appropriate provide orders to resume home care at time of discharge.    Keshia Schwab RN, BSN  [facility] Liaison  [phone]

## 2017-06-01 NOTE — DISCHARGE INSTRUCTIONS
_______________________  [facility]  [phone]  [place]  [phone]  ______________________

## 2017-06-01 NOTE — PROGRESS NOTES
5/31/17: CM received Epic notification of ED visit with fever, poor appetite.  Will continue to review Epic for admission.     Jamie Sharma RN, N  [facility]

## 2017-06-01 NOTE — PROGRESS NOTES
"SPIRITUAL HEALTH SERVICES  SPIRITUAL ASSESSMENT Progress Note  [facility] 5A    PRIMARY FOCUS:     Emotional/spiritual/Buddhist distress    Support for coping    ILLNESS CIRCUMSTANCES:   Reviewed documentation. Reflective conversation shared with Sonya.  Reason for visit: request on admission.      Context of Serious Illness/Symptom(s) - Sonya spoke of many hospitalizations over the last couple years and the amputation of her legs last year.    Resources for Support - Supportive family    DISTRESS:     Emotional/Existential/Relational Distress -   o Sonya stated that last year it seemed as though she was in the hospital more often than not and that she hopes that this year will be better.  o She also spoke of the deaths of her father and a great aunt in the last year.    Spiritual/Mosque Distress - None discussed.    Social/Cultural/Economic Distress - \"I used to love hiking but since my legs were amputated last year, I can't do that any more... I get out with my motorized wheel chair some.\"    SPIRITUAL/Sikhism COPING:     Holiness/Lilli - Yazidism    Spiritual Practice(s) - Reviewed availability of eucharistic ministers. Per her request will \"keep me in your prayers.\"    Emotional/Existential/Relational Connections - Sonya identified her family as her primary source of support.    GOALS OF CARE:    Goals of Care - \"My mother  when she was my age.  I hope to live to be 74.\"    Meaning/Sense-Making -   o \"As you get older, you don't have control over what happens as much.\"  o \"It's been quite a journey and it's not over yet.\"    PLAN: Will visit this pt 1-2x / wk.  Visits are available on request.      Yuan Vaca M.Div.  Staff   Pager [phone]      "

## 2017-06-01 NOTE — PROGRESS NOTES
6/1/17: CM spoke with member - she was admitted - UTI - on IV antibiotics.  Member is feeling down about another UTI - also informed CM that she is unable to have surgery in June for suprapubic catheter d/t cardiac condition - states she needs to wait until September.   Cm will f/u with SW at [facility].     Jamie Sharma RN, PHN  [employer]

## 2017-06-02 ENCOUNTER — APPOINTMENT (OUTPATIENT)
Dept: PHYSICAL THERAPY | Facility: CLINIC | Age: 67
DRG: 872 | End: 2017-06-02
Attending: INTERNAL MEDICINE
Payer: COMMERCIAL

## 2017-06-02 VITALS
RESPIRATION RATE: 16 BRPM | HEIGHT: 67 IN | SYSTOLIC BLOOD PRESSURE: 147 MMHG | DIASTOLIC BLOOD PRESSURE: 75 MMHG | OXYGEN SATURATION: 98 % | WEIGHT: 136.24 LBS | BODY MASS INDEX: 21.38 KG/M2 | HEART RATE: 74 BPM | TEMPERATURE: 96.1 F

## 2017-06-02 LAB
BACTERIA SPEC CULT: ABNORMAL
CREAT SERPL-MCNC: 0.54 MG/DL (ref 0.52–1.04)
GFR SERPL CREATININE-BSD FRML MDRD: NORMAL ML/MIN/1.7M2
GLUCOSE BLDC GLUCOMTR-MCNC: 93 MG/DL (ref 70–99)
Lab: ABNORMAL
MICRO REPORT STATUS: ABNORMAL
MICROORGANISM SPEC CULT: ABNORMAL
MICROORGANISM SPEC CULT: ABNORMAL
SPECIMEN SOURCE: ABNORMAL

## 2017-06-02 PROCEDURE — 25000132 ZZH RX MED GY IP 250 OP 250 PS 637: Performed by: INTERNAL MEDICINE

## 2017-06-02 PROCEDURE — 36415 COLL VENOUS BLD VENIPUNCTURE: CPT | Performed by: INTERNAL MEDICINE

## 2017-06-02 PROCEDURE — 82565 ASSAY OF CREATININE: CPT | Performed by: INTERNAL MEDICINE

## 2017-06-02 PROCEDURE — 97161 PT EVAL LOW COMPLEX 20 MIN: CPT | Mod: GP | Performed by: STUDENT IN AN ORGANIZED HEALTH CARE EDUCATION/TRAINING PROGRAM

## 2017-06-02 PROCEDURE — 40000193 ZZH STATISTIC PT WARD VISIT: Performed by: STUDENT IN AN ORGANIZED HEALTH CARE EDUCATION/TRAINING PROGRAM

## 2017-06-02 PROCEDURE — 99238 HOSP IP/OBS DSCHRG MGMT 30/<: CPT | Mod: GC | Performed by: INTERNAL MEDICINE

## 2017-06-02 PROCEDURE — 25000128 H RX IP 250 OP 636: Performed by: INTERNAL MEDICINE

## 2017-06-02 RX ORDER — LEVOFLOXACIN 750 MG/1
750 TABLET, FILM COATED ORAL DAILY
Qty: 11 TABLET | Refills: 0 | Status: SHIPPED | OUTPATIENT
Start: 2017-06-02 | End: 2017-06-13

## 2017-06-02 RX ADMIN — UMECLIDINIUM 62.5 MCG: 62.5 AEROSOL, POWDER ORAL at 08:48

## 2017-06-02 RX ADMIN — ESCITALOPRAM OXALATE 10 MG: 10 TABLET ORAL at 08:50

## 2017-06-02 RX ADMIN — SUCRALFATE 1 G: 1 TABLET ORAL at 12:16

## 2017-06-02 RX ADMIN — PHENYTOIN SODIUM 200 MG: 100 CAPSULE ORAL at 08:49

## 2017-06-02 RX ADMIN — DICLOFENAC 2 G: 10 GEL TOPICAL at 08:54

## 2017-06-02 RX ADMIN — ASPIRIN 81 MG: 81 TABLET, COATED ORAL at 08:48

## 2017-06-02 RX ADMIN — LEVOFLOXACIN 750 MG: 500 TABLET, FILM COATED ORAL at 08:49

## 2017-06-02 RX ADMIN — CYANOCOBALAMIN TAB 1000 MCG 2000 MCG: 1000 TAB at 08:50

## 2017-06-02 RX ADMIN — HYDROMORPHONE HYDROCHLORIDE 2 MG: 2 TABLET ORAL at 08:49

## 2017-06-02 RX ADMIN — Medication 2 TABLET: at 08:50

## 2017-06-02 RX ADMIN — PANTOPRAZOLE SODIUM 40 MG: 40 TABLET, DELAYED RELEASE ORAL at 08:50

## 2017-06-02 RX ADMIN — HYDROMORPHONE HYDROCHLORIDE 2 MG: 2 TABLET ORAL at 00:48

## 2017-06-02 RX ADMIN — SUCRALFATE 1 G: 1 TABLET ORAL at 08:50

## 2017-06-02 RX ADMIN — ZINC SULFATE CAP 220 MG (50 MG ELEMENTAL ZN) 220 MG: 220 (50 ZN) CAP at 08:50

## 2017-06-02 RX ADMIN — PREGABALIN 150 MG: 75 CAPSULE ORAL at 08:50

## 2017-06-02 RX ADMIN — DORZOLAMIDE HYDROCHLORIDE 1 DROP: 20 SOLUTION/ DROPS OPHTHALMIC at 08:50

## 2017-06-02 RX ADMIN — FLUTICASONE FUROATE AND VILANTEROL TRIFENATATE 1 PUFF: 100; 25 POWDER RESPIRATORY (INHALATION) at 08:48

## 2017-06-02 RX ADMIN — CLOPIDOGREL 75 MG: 75 TABLET, FILM COATED ORAL at 08:50

## 2017-06-02 RX ADMIN — SODIUM CHLORIDE 1000 ML: 9 INJECTION, SOLUTION INTRAVENOUS at 10:33

## 2017-06-02 RX ADMIN — LEVETIRACETAM 500 MG: 500 TABLET, FILM COATED ORAL at 08:50

## 2017-06-02 RX ADMIN — FLUTICASONE PROPIONATE 1 SPRAY: 50 SPRAY, METERED NASAL at 12:16

## 2017-06-02 RX ADMIN — VITAMIN D, TAB 1000IU (100/BT) 2000 UNITS: 25 TAB at 08:50

## 2017-06-02 RX ADMIN — ESTRADIOL 1 MG: 1 TABLET ORAL at 08:50

## 2017-06-02 RX ADMIN — HYDROMORPHONE HYDROCHLORIDE 2 MG: 2 TABLET ORAL at 04:18

## 2017-06-02 RX ADMIN — VANCOMYCIN HYDROCHLORIDE 1000 MG: 1 INJECTION, SOLUTION INTRAVENOUS at 05:26

## 2017-06-02 RX ADMIN — ACETAMINOPHEN 500 MG: 500 TABLET, FILM COATED ORAL at 03:04

## 2017-06-02 RX ADMIN — DICLOFENAC 2 G: 10 GEL TOPICAL at 12:17

## 2017-06-02 NOTE — PROGRESS NOTES
06/02/17 1000   Quick Adds   Type of Visit Initial PT Evaluation   Living Environment   Lives With spouse   Living Arrangements assisted living   Home Accessibility no concerns   Number of Stairs to Enter Home 0   Number of Stairs Within Home 0   Stair Railings at Home none   Living Environment Comment Pt lives with her wife in an skilled nursing. States they have help via button call system as needed. Her wife is not in great health   Self-Care   Dominant Hand right   Usual Activity Tolerance moderate   Current Activity Tolerance moderate   Equipment Currently Used at Home power chair;shower chair;commode   Activity/Exercise/Self-Care Comment Pt reports she is independent with ADLS and uses the skilled nursing for cleaning, meals and laundry   Functional Level Prior   Ambulation 4-->completely dependent   Transferring 0-->independent   Toileting 1-->assistive equipment   Bathing 1-->assistive equipment   Dressing 0-->independent   Eating 0-->independent   Communication 0-->understands/communicates without difficulty   Swallowing 0-->swallows foods/liquids without difficulty   Cognition 0 - no cognition issues reported   Fall history within last six months yes   Number of times patient has fallen within last six months 3   Which of the above functional risks had a recent onset or change? transferring   Prior Functional Level Comment Pt is independent with transfers to a power wheelchair then independent from her wc basis. States she has had a couple of falls during transfers but is typically fairly stable.    General Information   Onset of Illness/Injury or Date of Surgery - Date 05/31/17   Referring Physician Pascual Carballo   Patient/Family Goals Statement Get back home   Pertinent History of Current Problem (include personal factors and/or comorbidities that impact the POC) 67 year old female with multiple medical problems including CAD, s/p inferior MI with COLLEEN to pRCA & mRCA 9/17/2016, HTN, HLD, PVD s/p bilateral AKA,  h/o LLE blood  "clot prior to amputation, sickle cell trait, h/o anemia, COPD, asthma, GERD, dysphagia with history of esophageal stricture, s/p dilation, NIDDM, neuropathy, chronic low back pain with intrathecal pump, seizure disorder, h/o CVA s/p stent to left carotid,  depression/anxiety, schizoaffective disorder, and sexual reassignment surgery (male to female) who presents from her nursing home with fevers, lethargy, and decreased appetite over the last few days, found to have UTI. Improving today without fevers on gentamicin and levofloxacin   Precautions/Limitations fall precautions   Heart Disease Risk Factors Age;Gender;Race;Medical history;Overweight;Lack of physical activity;High blood pressure   General Observations B AKA   General Info Comments Activity: Up with assistance    Cognitive Status Examination   Orientation orientation to person, place and time   Level of Consciousness alert   Follows Commands and Answers Questions 100% of the time   Personal Safety and Judgment intact   Memory intact   Cognitive Comment No deficits   Pain Assessment   Patient Currently in Pain No   Integumentary/Edema   Integumentary/Edema no deficits were identifed   Posture    Posture Protracted shoulders   Range of Motion (ROM)   ROM Comment WFL   Strength   Strength Comments WFL   Bed Mobility   Bed Mobility Comments Independent with \"transfer pole\" simulation by PT   Transfer Skills   Transfer Comments Able to transfer into wheelchair by turning around then lifting self backwards into the chair (rotates 180 degrees)(   Gait   Gait Comments NT- non-ambulatory with power wheelchair use   Balance   Balance Comments Good sitting balance   Sensory Examination   Sensory Perception Comments NT   Clinical Impression   Criteria for Skilled Therapeutic Intervention no problems identified which require skilled intervention   Influenced by the following impairments None   Functional limitations due to impairments None   Clinical Presentation " "Stable/Uncomplicated   Clinical Presentation Rationale At baseline with active infection   Clinical Decision Making (Complexity) Low complexity   Therapy Frequency` (1x eval)   Predicted Duration of Therapy Intervention (days/wks) 1 day   Anticipated Equipment Needs at Discharge (home equipment)   Anticipated Discharge Disposition Home;Home with Assist   Risk & Benefits of therapy have been explained Yes   Patient, Family & other staff in agreement with plan of care Yes   Clinical Impression Comments Patient presents at baseline function. She is independent with bed mobility and transfers though does not have her home powerwheelchair present in the hospital. Will be able to discharge home with services from [facility] per baseline. No acute PT needs or follow up needed.    [facility] [employer] TM \"6 Clicks\"   2016, Trustees of [facility], under license to [facility] [facility].  All rights reserved.   6 Clicks Short Forms Basic Mobility Inpatient Short Form   [facility] AM-PAC  \"6 Clicks\" V.2 Basic Mobility Inpatient Short Form   1. Turning from your back to your side while in a flat bed without using bedrails? 4 - None   2. Moving from lying on your back to sitting on the side of a flat bed without using bedrails? 4 - None   3. Moving to and from a bed to a chair (including a wheelchair)? 4 - None   4. Standing up from a chair using your arms (e.g., wheelchair, or bedside chair)? 1 - Total   5. To walk in hospital room? 1 - Total   6. Climbing 3-5 steps with a railing? 1 - Total   Basic Mobility Raw Score (Score out of 24.Lower scores equate to lower levels of function) 15   Total Evaluation Time   Total Evaluation Time (Minutes) 10     "

## 2017-06-02 NOTE — PROGRESS NOTES
6/2/17: CM received call from Vida FRANCES at [phone].   Anticipated d/c later today back to [place] - member will go home with oral antibiotics.  Member inquired about [place] catheter placement - this is being looked into.  Member will resume with [facility] - PT.  Provided Vida with [facility] contact #.     Jamie Sharma RN, PHN  [facility]

## 2017-06-02 NOTE — PLAN OF CARE
Problem: Goal Outcome Summary  Goal: Goal Outcome Summary  Outcome: No Change  PT VSS. Alert and oriented x4. Receiving PRN dilaudid for lower back pain. Pt repositions independently. Pt calls appropriately. Sleeping between cares. Will continue plan of care.

## 2017-06-02 NOTE — DISCHARGE SUMMARY
[facility] Discharge Summary    Sonya Foote MRN# [id number]   Age: 67 year old YOB: 1950     Date of Admission:  5/31/2017  Date of Discharge::  6/2/2017  Admitting Physician:  Pascual Carballo MD  Discharge Physician:  Azael Alexandra MD     Home clinic: [facility], [place]          Admission Diagnoses:   Poor appetite [R63.0]  Weakness generalized [R53.1]  Acute pyelonephritis [N10]  Fever, unspecified [R50.9]  Fatigue, unspecified type [R53.83]          Discharge Diagnosis:   Active Problems:    Pyelonephritis         Procedures:   XR Chest 1 View - No acute cardiopulmonary abnormality          Medications Prior to Admission:     Prescriptions Prior to Admission   Medication Sig Dispense Refill Last Dose     polyethylene glycol (MIRALAX/GLYCOLAX) Packet Take 17 g by mouth daily Dissolved in water or juice   5/31/2017 at 0800     ESCITALOPRAM OXALATE PO Take 10 mg by mouth daily   5/31/2017 at 0800     ACETAMINOPHEN PO Take 1,000 mg by mouth every 6 hours as needed for fever Taking q4-6 hours, per MAR   5/30/2017 at 2000     pregabalin (LYRICA) 75 MG capsule Take 2 capsules (150 mg) by mouth 2 times daily 120 capsule 5 5/31/2017 at 0800     metoprolol (TOPROL-XL) 25 MG 24 hr tablet Take 1 tablet (25 mg) by mouth daily 30 tablet 0 5/31/2017 at 0800     cetirizine (ZYRTEC) 10 MG tablet Take 1 tablet (10 mg) by mouth daily as needed for allergies 90 tablet 3 5/31/2017 at 0800     diclofenac (VOLTAREN) 1 % GEL topical gel Apply 2 g topically 4 times daily to hands 100 g 11 Unknown at Unknown time     EPINEPHrine (EPIPEN JR) 0.15 MG/0.3ML injection Inject 0.3 mLs (0.15 mg) into the muscle as needed for anaphylaxis 0.6 mL 1 Taking     lisinopril (PRINIVIL/ZESTRIL) 10 MG tablet Take 1 tablet (10 mg) by mouth daily 90 tablet 3 5/31/2017 at 0800     pantoprazole (PROTONIX) 40 MG EC tablet Take 40 mg by mouth daily   5/31/2017 at 0800     risperiDONE (RISPERDAL) 0.5 MG tablet Take  0.5 mg by mouth At Bedtime   5/30/2017 at 2000     ferrous sulfate (IRON) 325 (65 FE) MG tablet Take 1 tablet (325 mg) by mouth 2 times daily With meals 60 tablet 2 5/31/2017 at 0800     blood glucose monitoring (ONE TOUCH ULTRA) test strip Use to test blood sugars 3 times daily or as directed. 3 Box 3 Taking     order for DME Equipment being ordered: wheelchair seat cushion 1 Device 0 Taking     order for DME Equipment being ordered: CPAP supplies mask, hose, filters, cushion    fax to [facility] at [phone] 10 Device prn Taking     sucralfate (CARAFATE) 1 GM tablet Take 1 tablet (1 g) by mouth 4 times daily May dissolve in 10 mL water is necessary. (Start upon completion of carafate suspension) 40 tablet 5 5/31/2017 at 1200     order for DME Equipment being ordered: CPAP supplies mask, hose, filters, cushion fax to [facility] at [phone]  Disp: 10 Device Refills: prn   Class: Local Print Start: 2/10/2017 1 Device 0 Taking     order for DME Equipment being ordered: Nebulizer and tubing supplies 1 Units 0 Taking     albuterol (2.5 MG/3ML) 0.083% neb solution INHALE 1 VIAL VIA NEBULIZER EVERY 6 HOURS AS NEEDED 360 mL 11 Unknown at Unknown time     order for DME ACcu check BID 1 Device 0 Taking     ondansetron (ZOFRAN-ODT) 4 MG ODT tab Take 1 tablet (4 mg) by mouth every 6 hours (Patient taking differently: Take 4 mg by mouth every 6 hours as needed ) 120 tablet 0 5/31/2017 at 0800     CYANOCOBALAMIN PO Take 2,000 mcg by mouth daily   5/31/2017 at 0800     Nutritional Supplements (RENÉE) PACK Take 1 packet by mouth 2 times daily   Unknown at Unknown time     HYDROmorphone (DILAUDID) 2 MG tablet Take 1 tablet (2 mg) by mouth every 3 hours as needed for moderate to severe pain 30 tablet 0 5/31/2017 at 0230     fluticasone (FLONASE) 50 MCG/ACT nasal spray Spray 1 spray into both nostrils daily   5/31/2017 at 0800     ADVAIR DISKUS 250-50 MCG/DOSE diskus inhaler Inhale 1 puff into the lungs 2 times daily     5/31/2017 at 0800     calcium citrate-vitamin D (CITRACAL) 315-250 MG-UNIT TABS Take 2 tablets by mouth daily 120 tablet 5 5/31/2017 at 0800     hydrochlorothiazide (MICROZIDE) 12.5 MG capsule Take 1 capsule (12.5 mg) by mouth daily (Patient taking differently: Take 12.5 mg by mouth daily Hold for sbp<90) 90 capsule 3 5/31/2017 at 0800     estradiol (ESTRACE) 1 MG tablet Take 1 tablet (1 mg) by mouth daily 90 tablet 3 5/31/2017 at 0800     levETIRAcetam (KEPPRA) 500 MG tablet Take 1 tablet (500 mg) by mouth 2 times daily 180 tablet 1 5/31/2017 at 0800     traZODone (DESYREL) 100 MG tablet Take 1 tablet (100 mg) by mouth At Bedtime 90 tablet 3 5/30/2017 at 2000     aspirin EC 81 MG EC tablet Take 1 tablet (81 mg) by mouth daily (Patient taking differently: Take 81 mg by mouth every morning ) 90 tablet 3 5/31/2017 at 0800     clopidogrel (PLAVIX) 75 MG tablet Take 1 tablet (75 mg) by mouth daily 90 tablet 3 5/31/2017 at 0800     blood glucose monitoring (ONE TOUCH ULTRA 2) meter device kit Use to test blood sugars 3 times daily or as directed. 1 kit 0 Taking     blood glucose monitoring (ONE TOUCH ULTRASOFT) lancets Use to test blood sugar 3 times daily or as directed. 3 Box 3 Taking     phenytoin 200 MG CAPS Take 200 mg by mouth 2 times daily (Patient taking differently: Take 200 mg by mouth 2 times daily (takes at 8 AM and 8 PM)) 60 capsule 0 5/31/2017 at 0800     dorzolamide (TRUSOPT) 2 % ophthalmic solution Place 1 drop into both eyes 2 times daily    5/31/2017 at 0800     SPIRIVA HANDIHALER 18 MCG inhalation capsule INHALE THE CONTENTS OF 1 CAPSULE VIA INHALATION DEVICE DAILY 30 capsule 2 5/31/2017 at 0800     zinc 50 MG TABS Take 1 tablet by mouth daily   5/31/2017 at 0800     nitroglycerin (NITROSTAT) 0.4 MG SL tablet Place 1 tablet (0.4 mg) under the tongue every 5 minutes as needed for chest pain if you are still having symptoms after 3 doses (15 minutes) call 911. 25 tablet 1 Unknown at Unknown time      MEDICATION GIVEN BY INTRATHECAL PUMP - INSTRUCTION continuous May 19, 2017 - per [facility] in [place] [phone]:  Conc: Bupivacaine 20 mg/mL and Fentanyl 2000 mcg/mL.  Continuous: Bupivacaine 7.265 mg/day and Fentanyl 726.5 mcg/day.  Boluses: Up to 7 boluses per 24-hr period of Bupivicaine 0.599 mg and Fentanyl 59.9 mcg    Pump Refill Date at Max Activations: 7/2/17   Taking     latanoprost (XALATAN) 0.005 % ophthalmic solution Place 1 drop into both eyes At Bedtime 2.5 mL 11 5/30/2017 at 2000     atorvastatin (LIPITOR) 40 MG tablet Take 1 tablet (40 mg) by mouth daily (Patient taking differently: Take 40 mg by mouth At Bedtime ) 90 tablet 3 5/30/2017 at 2000     order for DME Equipment being ordered: Glucerna daily shakes with each meal 1 Box 11 Taking     prochlorperazine (COMPAZINE) 10 MG tablet Take 1 tablet (10 mg) by mouth every 8 hours as needed 20 tablet 0 Unknown at Unknown time     thin (NO BRAND SPECIFIED) lancets Use to test blood sugar 3 times daily or as directed. 1 Box 10 Taking     ORDER FOR DME Equipment being ordered: Nebulizer and supplies 1 Units 0 Taking     ORDER FOR DME Equipment being ordered: Power Wheelchair 1 Device 0 Taking     ORDER FOR DME Equipment being ordered: Depends briefs 1 Month 11 Taking     EASY TOUCH LANCETS 30G/TWIST MISC    Taking     Cholecalciferol (VITAMIN D) 2000 UNITS tablet Take 2,000 Units by mouth daily. 100 tablet 3 5/31/2017 at 0800     Multiple Vitamin (MULTIVITAMIN OR) Take 1 tablet by mouth daily    5/31/2017 at 0800             Discharge Medications:     Current Discharge Medication List      START taking these medications    Details   levofloxacin (LEVAQUIN) 750 MG tablet Take 1 tablet (750 mg) by mouth daily for 11 days  Qty: 11 tablet, Refills: 0    Comments: Total of 14 day course (will have longer course due to history of recurrent UTI's)  Associated Diagnoses: Acute pyelonephritis         CONTINUE these medications which  have NOT CHANGED    Details   polyethylene glycol (MIRALAX/GLYCOLAX) Packet Take 17 g by mouth daily Dissolved in water or juice      ESCITALOPRAM OXALATE PO Take 10 mg by mouth daily      ACETAMINOPHEN PO Take 1,000 mg by mouth every 6 hours as needed for fever Taking q4-6 hours, per MAR      pregabalin (LYRICA) 75 MG capsule Take 2 capsules (150 mg) by mouth 2 times daily  Qty: 120 capsule, Refills: 5    Associated Diagnoses: Pain in both upper extremities      metoprolol (TOPROL-XL) 25 MG 24 hr tablet Take 1 tablet (25 mg) by mouth daily  Qty: 30 tablet, Refills: 0    Associated Diagnoses: Old myocardial infarction      cetirizine (ZYRTEC) 10 MG tablet Take 1 tablet (10 mg) by mouth daily as needed for allergies  Qty: 90 tablet, Refills: 3    Associated Diagnoses: Seasonal allergic rhinitis, unspecified allergic rhinitis trigger      diclofenac (VOLTAREN) 1 % GEL topical gel Apply 2 g topically 4 times daily to hands  Qty: 100 g, Refills: 11    Associated Diagnoses: Primary osteoarthritis of both hands      EPINEPHrine (EPIPEN JR) 0.15 MG/0.3ML injection Inject 0.3 mLs (0.15 mg) into the muscle as needed for anaphylaxis  Qty: 0.6 mL, Refills: 1    Associated Diagnoses: Bee sting reaction, undetermined intent, subsequent encounter      lisinopril (PRINIVIL/ZESTRIL) 10 MG tablet Take 1 tablet (10 mg) by mouth daily  Qty: 90 tablet, Refills: 3    Associated Diagnoses: Essential hypertension with goal blood pressure less than 130/80      pantoprazole (PROTONIX) 40 MG EC tablet Take 40 mg by mouth daily      risperiDONE (RISPERDAL) 0.5 MG tablet Take 0.5 mg by mouth At Bedtime      ferrous sulfate (IRON) 325 (65 FE) MG tablet Take 1 tablet (325 mg) by mouth 2 times daily With meals  Qty: 60 tablet, Refills: 2      blood glucose monitoring (ONE TOUCH ULTRA) test strip Use to test blood sugars 3 times daily or as directed.  Qty: 3 Box, Refills: 3    Associated Diagnoses: Type 2 diabetes mellitus with diabetic peripheral  angiopathy without gangrene, without long-term current use of insulin (H)      !! order for DME Equipment being ordered: wheelchair seat cushion  Qty: 1 Device, Refills: 0    Associated Diagnoses: Critical lower limb ischemia      !! order for DME Equipment being ordered: CPAP supplies mask, hose, filters, cushion    fax to [facility] at [phone]  Qty: 10 Device, Refills: prn    Associated Diagnoses: COPD (chronic obstructive pulmonary disease) (H)      sucralfate (CARAFATE) 1 GM tablet Take 1 tablet (1 g) by mouth 4 times daily May dissolve in 10 mL water is necessary. (Start upon completion of carafate suspension)  Qty: 40 tablet, Refills: 5    Associated Diagnoses: Gastroesophageal reflux disease without esophagitis      !! order for DME Equipment being ordered: CPAP supplies mask, hose, filters, cushion fax to [facility] at [phone]  Disp: 10 Device Refills: prn   Class: Local Print Start: 2/10/2017  Qty: 1 Device, Refills: 0    Associated Diagnoses: Chronic obstructive pulmonary disease, unspecified COPD type (H)      !! order for DME Equipment being ordered: Nebulizer and tubing supplies  Qty: 1 Units, Refills: 0    Associated Diagnoses: Simple chronic bronchitis (H)      albuterol (2.5 MG/3ML) 0.083% neb solution INHALE 1 VIAL VIA NEBULIZER EVERY 6 HOURS AS NEEDED  Qty: 360 mL, Refills: 11    Associated Diagnoses: Chronic obstructive pulmonary disease, unspecified COPD type (H)      !! order for DME ACcu check BID  Qty: 1 Device, Refills: 0    Associated Diagnoses: Type 2 diabetes mellitus with diabetic peripheral angiopathy without gangrene, without long-term current use of insulin (H)      ondansetron (ZOFRAN-ODT) 4 MG ODT tab Take 1 tablet (4 mg) by mouth every 6 hours  Qty: 120 tablet, Refills: 0    Associated Diagnoses: Intractable vomiting with nausea, unspecified vomiting type      CYANOCOBALAMIN PO Take 2,000 mcg by mouth daily      Nutritional Supplements (RENÉE) PACK Take 1 packet by  mouth 2 times daily      HYDROmorphone (DILAUDID) 2 MG tablet Take 1 tablet (2 mg) by mouth every 3 hours as needed for moderate to severe pain  Qty: 30 tablet, Refills: 0      fluticasone (FLONASE) 50 MCG/ACT nasal spray Spray 1 spray into both nostrils daily      ADVAIR DISKUS 250-50 MCG/DOSE diskus inhaler Inhale 1 puff into the lungs 2 times daily       calcium citrate-vitamin D (CITRACAL) 315-250 MG-UNIT TABS Take 2 tablets by mouth daily  Qty: 120 tablet, Refills: 5    Associated Diagnoses: Osteoporosis      hydrochlorothiazide (MICROZIDE) 12.5 MG capsule Take 1 capsule (12.5 mg) by mouth daily  Qty: 90 capsule, Refills: 3    Associated Diagnoses: Essential hypertension with goal blood pressure less than 130/80      estradiol (ESTRACE) 1 MG tablet Take 1 tablet (1 mg) by mouth daily  Qty: 90 tablet, Refills: 3    Associated Diagnoses: Person who has had sex change operation      levETIRAcetam (KEPPRA) 500 MG tablet Take 1 tablet (500 mg) by mouth 2 times daily  Qty: 180 tablet, Refills: 1    Associated Diagnoses: Nausea      traZODone (DESYREL) 100 MG tablet Take 1 tablet (100 mg) by mouth At Bedtime  Qty: 90 tablet, Refills: 3    Associated Diagnoses: Anxiety state      aspirin EC 81 MG EC tablet Take 1 tablet (81 mg) by mouth daily  Qty: 90 tablet, Refills: 3    Associated Diagnoses: Unstable angina (H)      clopidogrel (PLAVIX) 75 MG tablet Take 1 tablet (75 mg) by mouth daily  Qty: 90 tablet, Refills: 3    Comments: .  Associated Diagnoses: PAD (peripheral artery disease) (H); UGI bleed; Osteoporosis; Nausea      blood glucose monitoring (ONE TOUCH ULTRA 2) meter device kit Use to test blood sugars 3 times daily or as directed.  Qty: 1 kit, Refills: 0    Associated Diagnoses: Type 2 diabetes, HbA1C goal < 8% (H)      !! blood glucose monitoring (ONE TOUCH ULTRASOFT) lancets Use to test blood sugar 3 times daily or as directed.  Qty: 3 Box, Refills: 3    Associated Diagnoses: Type 2 diabetes, HbA1C goal <  8% (H)      phenytoin 200 MG CAPS Take 200 mg by mouth 2 times daily  Qty: 60 capsule, Refills: 0    Associated Diagnoses: Seizure disorder (H)      dorzolamide (TRUSOPT) 2 % ophthalmic solution Place 1 drop into both eyes 2 times daily       SPIRIVA HANDIHALER 18 MCG inhalation capsule INHALE THE CONTENTS OF 1 CAPSULE VIA INHALATION DEVICE DAILY  Qty: 30 capsule, Refills: 2    Associated Diagnoses: COPD (chronic obstructive pulmonary disease) (H)      zinc 50 MG TABS Take 1 tablet by mouth daily      nitroglycerin (NITROSTAT) 0.4 MG SL tablet Place 1 tablet (0.4 mg) under the tongue every 5 minutes as needed for chest pain if you are still having symptoms after 3 doses (15 minutes) call 911.  Qty: 25 tablet, Refills: 1    Associated Diagnoses: Chronic systolic congestive heart failure (H); Old myocardial infarction      MEDICATION GIVEN BY INTRATHECAL PUMP - INSTRUCTION continuous May 19, 2017 - per [facility] in [place] [phone]:  Conc: Bupivacaine 20 mg/mL and Fentanyl 2000 mcg/mL.  Continuous: Bupivacaine 7.265 mg/day and Fentanyl 726.5 mcg/day.  Boluses: Up to 7 boluses per 24-hr period of Bupivicaine 0.599 mg and Fentanyl 59.9 mcg    Pump Refill Date at Max Activations: 7/2/17      latanoprost (XALATAN) 0.005 % ophthalmic solution Place 1 drop into both eyes At Bedtime  Qty: 2.5 mL, Refills: 11    Comments: Plz remind pt to f/u as scheduled, 3/25/16. Thanks!  Associated Diagnoses: Primary open angle glaucoma, stage unspecified      atorvastatin (LIPITOR) 40 MG tablet Take 1 tablet (40 mg) by mouth daily  Qty: 90 tablet, Refills: 3    Associated Diagnoses: Hyperlipidemia LDL goal <100      !! order for DME Equipment being ordered: Glucerna daily shakes with each meal  Qty: 1 Box, Refills: 11    Associated Diagnoses: 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)      prochlorperazine (COMPAZINE) 10 MG tablet Take 1 tablet (10 mg) by mouth every 8  hours as needed  Qty: 20 tablet, Refills: 0    Associated Diagnoses: Nausea with vomiting      !! thin (NO BRAND SPECIFIED) lancets Use to test blood sugar 3 times daily or as directed.  Qty: 1 Box, Refills: 10    Comments: reliamed device is the lancet hold  Associated Diagnoses: Type 2 diabetes, HbA1C goal < 8% (H)      !! ORDER FOR DME Equipment being ordered: Nebulizer and supplies  Qty: 1 Units, Refills: 0    Associated Diagnoses: Obstructive chronic bronchitis with exacerbation (H)      !! ORDER FOR DME Equipment being ordered: Power Wheelchair  Qty: 1 Device, Refills: 0    Associated Diagnoses: CVA (cerebral infarction); HTN (hypertension)      !! ORDER FOR DME Equipment being ordered: Depends briefs  Qty: 1 Month, Refills: 11    Associated Diagnoses: Incontinence      !! EASY TOUCH LANCETS 30G/TWIST MISC       Cholecalciferol (VITAMIN D) 2000 UNITS tablet Take 2,000 Units by mouth daily.  Qty: 100 tablet, Refills: 3      Multiple Vitamin (MULTIVITAMIN OR) Take 1 tablet by mouth daily        !! - Potential duplicate medications found. Please discuss with provider.                Consultations:   No consultations were requested during this admission          Brief History of Illness:   Sonya Foote is a 67 year old female with multiple medical problems including CAD, s/p inferior MI with COLLEEN to pRCA & mRCA 9/17/2016, HTN, HLD, PVD s/p bilateral AKA,  h/o LLE blood clot prior to amputation, sickle cell trait, h/o anemia, COPD, asthma, GERD, dysphagia with history of esophageal stricture, s/p dilation, NIDDM, neuropathy, chronic low back pain with intrathecal pump, seizure disorder, h/o CVA s/p stent to left carotid,  depression/anxiety, schizoaffective disorder, and sexual reassignment surgery (male to female) who presents from her nursing home with fevers, lethargy, and decreased appetite over the last few days.     Reportedly feeling ill for 2-3 days and per nursing facility has not had a bowel movement. With  "this, she has felt feverish (confirmed 102.2 at facility), fatigued, with poor PO intake. Also reports urinary frequency and dysuria with consequent thirst.     In ED CXR, UA with micro, urine culture, CBC, BMP, lactic acid, and blood cultures obtained.  CXR (preliminary reading) did not show any infiltrates.  Lactic acid was normal.   UA showed large LE and WBC. Patient given 1 liter IVF and given 1 dose of IV Levaquin.          Hospital Course:   # Recurrent UTI's:  # Pyelonephritis with sepsis: patient well known to urology with known recurrent UTI'S. History of functional incontinence who due to her AKA's is unable to empty her bladder when she wants to and this is an etiology for her recurrent UTI's. She has been evaluated for an elective suprapubic catheter, which after discussion with Urology, will be possible once she is off antiplatelet therapy (COLLEEN placed 9/2016).   Presented with leukocytosis, febrile, AMS with UA notable for WBC's, leukocyte esterase positive. Normal Lactate and clear CXR. She was initially placed on levofloxacin with gentamicin then vanc added for coverage of her history of Klebsiella and Enterobacter. As her Urine culture ended up growing gram negative lactose fermenting rods and she continued to improve on her current regimen, plan was to deescalate her to levofloxacin and return home with plan to narrow as an outpatient if susceptibilities show a more favorable antibiotic. Will do a total of 14 day course given history of recurrent UTI's.    Discharge Exam:  /75 (BP Location: Right arm)  Pulse 74  Temp 96.1  F (35.6  C) (Oral)  Resp 16  Ht 1.702 m (5' 7\")  Wt 61.8 kg (136 lb 3.9 oz)  SpO2 98%  BMI 21.34 kg/m2  General Appearance: Awake, alert in NAD. Very pleasant and conversational  Eyes: anicteric, EOMI  HEENT: moist mucous membranes  Respiratory: clear to auscultation bilaterally without crackles  Cardiovascular: soft 1-2/6 systolic murmur, normal rate, regular " rhythm  GI: soft, non-distended.   Musculoskeletal: bilateral AKA  Neurologic: AOx4         Discharge Instructions and Follow-Up:   Discharge diet: Regular   Discharge activity: Activity as tolerated   Discharge follow-up: Follow up with PCP in 2 weeks  Follow up with Urology in September after confirming with Cardiology that she can be off the Plavix and ASA            Discharge Disposition:   Discharged to assisted living       Azael Alexandra MD   Medicine-Pediatrics PGY2  Eve 5 - [phone]   Physician Attestation   I, Pascual Carballo, saw and evaluated this patient prior to discharge.  I discussed the patient with the resident and agree with plan of care as documented in the resident note.      I personally reviewed vital signs, medications, labs and imaging.    I personally spent 25 minutes on discharge activities.    Pascual Carballo  Date of Service (when I saw the patient): 06/02/17

## 2017-06-02 NOTE — PLAN OF CARE
Problem: Goal Outcome Summary  Goal: Goal Outcome Summary  Outcome: Adequate for Discharge Date Met:  06/02/17  A&Ox4, VSS on RA. Assist of 2. Bilateral AKA. Pt c/o back pain, PRN dilaudid given x1. Morning meds given in applesauce. Discharge orders placed. Pt to discharge to [facility], [facility] with transport. Discharge instructions reviewed with pt, verbalized understanding and importance of compliance. PIV removed, cannula intact. Will continue to monitor and follow POC.

## 2017-06-02 NOTE — PLAN OF CARE
Problem: Goal Outcome Summary  Goal: Goal Outcome Summary  5A PT: PT evaluation completed. Patient presents at baseline function. She is independent with bed mobility and transfers though does not have her home powerwheelchair present in the hospital. Will need problem solving for transportation home.  Will be able to discharge home with services from [facility] per baseline. No acute PT needs or follow up needed.

## 2017-06-02 NOTE — PROGRESS NOTES
Care Coordinator Progress Note     Admission Date/Time:  5/31/2017  Attending MD:  Pascual Carballo MD     Data  Chart reviewed, discussed with interdisciplinary team.   Patient was admitted for:    Acute pyelonephritis  Fever, unspecified  Weakness generalized  Fatigue, unspecified type  Poor appetite  Coronary artery disease of native artery of native heart with stable angina pectoris (H)  Diabetes mellitus type 2 without retinopathy (H)  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 (H).    Concerns with insurance coverage for discharge needs: None.  Current Living Situation: Patient lives in an assisted living facility.  Support System: Supportive and Involved  Services Involved: Assisted Living, DME and Home Care  Transportation: Van, wheelchair accessible  Barriers to Discharge: chronically ill and physical impairment    Coordination of Care and Referrals: Provided patient/family with options for DME and Home Care.        Assessment  Patient lives with wife at [facility] (ph: [phone], fax: [phone]).  Message left for nurse at [facility] to call back with any concerns and notified we anticipate discharge later today and will fax orders.  Patient currently has [facility] for PT.  Resumption orders placed.  Upon chart review, patient has a  through her insurance, Fernando Sharma (phone: [phone]).  Fernando was updated on discharge plan.  Awaiting PT evaluation for discharge recommendations.  Will need transportation arranged upon discharge.     [facility] w/c transport set up for 1330.  Orders faxed to [facility] and [facility].    _______________________  [facility]  Phone  [phone]  Fax  [phone]  ______________________      Plan  Anticipated Discharge Date:  today  Anticipated Discharge Plan:  Back to [facility] on oral abx    Vida Wheeler RN, BSN  Care Coordinator [facility] [address]  Pager: [phone]  Phone: [phone]

## 2017-06-05 ENCOUNTER — TELEPHONE (OUTPATIENT)
Dept: INTERNAL MEDICINE | Facility: CLINIC | Age: 67
End: 2017-06-05

## 2017-06-05 NOTE — TELEPHONE ENCOUNTER
Homecare is calling for orders:  Pt/ot/slt for eval and treat.  Skilled nurse visits, 2w1w, 1w3w, 2 PRN.  Verbal OK given, per standing orders.  Form will be faxed to PCP to review, sign, and complete.  Estela Tapia RN

## 2017-06-05 NOTE — PROGRESS NOTES
6/2/17: Received confirmation that member d/c home.   Spoke with member - will f/u again next week.   AIDE log completed.     Jamie Sharma RN, PHN  [facility]

## 2017-06-06 LAB
BACTERIA SPEC CULT: NO GROWTH
BACTERIA SPEC CULT: NO GROWTH
MICRO REPORT STATUS: NORMAL
MICRO REPORT STATUS: NORMAL
SPECIMEN SOURCE: NORMAL
SPECIMEN SOURCE: NORMAL

## 2017-06-07 NOTE — PROGRESS NOTES
6/5/17: CM spoke with member - she states she is feeling a little better but is wiped out.  She wanted a Landrum placed however not placed d/t risk of infection - member states she already is getting frequent UTI's so didn't understand why Landrum wasn't placed.   Member is hoping that in Sept she can have the suprapubic placed.       Member is resuming with [facility].   She also stated that she is going to discuss hospital bed with PT/OT as her spouse is getting one and they are planning to get rid of bed to make room and she feels she is ready for hospital bed as well now.   Member will keep CM up to date.     She has PCP f/u post hospitalization on 6/8.     Jamie Sharma RN, PHN  [facility]

## 2017-06-09 NOTE — PROGRESS NOTES
6/8/17: ELVIRA spoke with member - she received frame.  ELVIRA notified JALEEL Leone with [facility].     Jamie Sharma RN, N  [facility]

## 2017-06-09 NOTE — PROGRESS NOTES
6/7/17: CM has been f/u with [facility] re: toilet safety frame.   CM received email from Nalini stating safety frame shipped out today.     Jamie Sharma RN, N  [facility]

## 2017-06-12 ENCOUNTER — CARE COORDINATION (OUTPATIENT)
Dept: GERIATRIC MEDICINE | Facility: CLINIC | Age: 67
End: 2017-06-12

## 2017-06-12 DIAGNOSIS — Z76.89 HEALTH CARE HOME: Chronic | ICD-10-CM

## 2017-06-12 NOTE — PROGRESS NOTES
6/12/17: CM received call from member stating that her spouse, Toña, passed away on Saturday.  Member is at a loss right now and is grieving.   She wanted CM to know  - CM will support member - discussed [facility] worker/services.  Member states she hasn't heard from them in a while - CM will f/u.  CM placed call to [facility] - spoke with Yusra - she will have Emily Goss f/u with CM.      CM also spoke with JALEEL Leone with [facility] - he has spoken with member and is aware of spouse passing away.  He states he plans to call member on Thurs to f/u when member would like visit next.  He has passed on to [facility] OT, re: hospital bed.   Vasu also states that he feels SNV would be beneficial for member as well - he states that he will make referral.      Jamie Sharma RN, PHN  [facility]

## 2017-06-13 ENCOUNTER — TELEPHONE (OUTPATIENT)
Dept: INTERNAL MEDICINE | Facility: CLINIC | Age: 67
End: 2017-06-13

## 2017-06-13 NOTE — TELEPHONE ENCOUNTER
"Nurse is calling from the [facility] where pt lives. She says that pt's partner passed away a couple of nights ago, and pt is having a difficult time with it. Nurse says that Sonya needs a lot of encouragement to get out of bed, she says things like \"I feel like giving up.\" Nurse says that pt has no plan of self-harm. She is just in a depressed mood.   She is wondering if you would like to increase the dosage of pt's Lexapro? She currently takes:  escitalopram (LEXAPRO) 10 MG tablet daily.   Pharmacy is pending for any med changes.   Also facility would need any new orders faxed to them.   Please fax to---FAX # [phone].   Any further recommendations for pt?  "

## 2017-06-13 NOTE — TELEPHONE ENCOUNTER
I would not increase dose until had opportunity to see patient.  May benefit from grief conseling which could be ordered if patient desired via mental health referral.

## 2017-06-14 NOTE — TELEPHONE ENCOUNTER
"Patient calling back.  Patient requested appointment with PCP to discuss possible medication change and referral for grief counseling.   Appointment made for 6/15/17.      Also, [facility] MONTGOMERY requested for PCP address/document a face to face in office visit for a roho w/c cushion 18\" X 18\" for pressure relief and a hospital bed.  [facility] MONTGOMERY will fax over LMN (letter of medical necessity) form for PCP to sign.    Fátima's #[phone]  "

## 2017-06-14 NOTE — PROGRESS NOTES
6/14/17: CM received vm from Emily Ferguson at [facility] at [phone] stating that they reached out to member in April however had incorrect # - they sent a letter giving a 2 week turn around time for member to call back and they received no call back and therefore terminated member. Emily stated member could be reinstated if felt needed.  CM placed call back to Emily - left vm stating CM should have been notified and that member does need to be reinstated and could use support at this time.  Will wait for call back.     Jamie Sharma RN, PHN  [facility]

## 2017-06-14 NOTE — TELEPHONE ENCOUNTER
I do not see the need to see me for this referral unless other issues need to be addressed, may place referral for Mental health counseling w/o appt for cosign

## 2017-06-15 ENCOUNTER — OFFICE VISIT (OUTPATIENT)
Dept: INTERNAL MEDICINE | Facility: CLINIC | Age: 67
End: 2017-06-15
Payer: COMMERCIAL

## 2017-06-15 ENCOUNTER — TELEPHONE (OUTPATIENT)
Dept: NURSING | Facility: CLINIC | Age: 67
End: 2017-06-15

## 2017-06-15 VITALS
TEMPERATURE: 98 F | HEART RATE: 83 BPM | OXYGEN SATURATION: 96 % | SYSTOLIC BLOOD PRESSURE: 131 MMHG | DIASTOLIC BLOOD PRESSURE: 81 MMHG

## 2017-06-15 DIAGNOSIS — F43.21 GRIEF REACTION: Primary | ICD-10-CM

## 2017-06-15 DIAGNOSIS — Z89.511 STATUS POST BELOW KNEE AMPUTATION OF RIGHT LOWER EXTREMITY (H): ICD-10-CM

## 2017-06-15 DIAGNOSIS — G40.909 SEIZURE DISORDER (H): ICD-10-CM

## 2017-06-15 DIAGNOSIS — J44.9 CHRONIC OBSTRUCTIVE PULMONARY DISEASE, UNSPECIFIED COPD TYPE (H): Chronic | ICD-10-CM

## 2017-06-15 DIAGNOSIS — M54.2 CERVICALGIA: ICD-10-CM

## 2017-06-15 LAB — PHENYTOIN SERPL-MCNC: 13.9 MG/L (ref 10–20)

## 2017-06-15 PROCEDURE — 36415 COLL VENOUS BLD VENIPUNCTURE: CPT | Performed by: INTERNAL MEDICINE

## 2017-06-15 PROCEDURE — 99214 OFFICE O/P EST MOD 30 MIN: CPT | Performed by: INTERNAL MEDICINE

## 2017-06-15 PROCEDURE — 80186 ASSAY OF PHENYTOIN FREE: CPT | Mod: 90 | Performed by: INTERNAL MEDICINE

## 2017-06-15 PROCEDURE — 80185 ASSAY OF PHENYTOIN TOTAL: CPT | Performed by: INTERNAL MEDICINE

## 2017-06-15 PROCEDURE — 99000 SPECIMEN HANDLING OFFICE-LAB: CPT | Performed by: INTERNAL MEDICINE

## 2017-06-15 RX ORDER — CYCLOBENZAPRINE HCL 10 MG
5-10 TABLET ORAL 3 TIMES DAILY PRN
Qty: 30 TABLET | Refills: 1 | Status: SHIPPED | OUTPATIENT
Start: 2017-06-15 | End: 2017-08-26

## 2017-06-15 NOTE — MR AVS SNAPSHOT
After Visit Summary   6/15/2017    Sonya Foote    MRN: [id number]           Patient Information     Date Of Birth          1950        Visit Information        Provider Department      6/15/2017 11:20 AM Allan Casey MD [facility]        Today's Diagnoses     Grief reaction    -  1    Cervicalgia        Seizure disorder (H)        Chronic obstructive pulmonary disease, unspecified COPD type (H)        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 (H)           Follow-ups after your visit        Additional Services     MENTAL HEALTH REFERRAL       Your provider has referred you to: [facility]: [facility] - Counseling (Individual/Couples/Family) - [facility] [phone]   [email or web address]/   *Patient will be contacted by [facility] scheduling partner, [facility]), to schedule an appointment.  Patients may also call [facility] to schedule.    All scheduling is subject to the client's specific insurance plan & benefits, provider/location availability, and provider clinical specialities.  Please arrive 15 minutes early for your first appointment and bring your completed paperwork.    Please be aware that coverage of these services is subject to the terms and limitations of your health insurance plan.  Call member services at your health plan with any benefit or coverage questions.                  Your next 10 appointments already scheduled     Jun 28, 2017 12:30 PM CDT   Return Visit with Elizabeth Oliver MD   [facility] ([facility])    [address]  [address]  [facility] [address]   [phone]            Jul 11, 2017 10:00 AM CDT   Return Sleep Patient with Guanaco Kowalski MD   [facility] ([facility])    [address] [place] [address]  "[place]  [address]  [facility] [address]   [phone]            Aug 28, 2017  1:30 PM CDT   (Arrive by 1:15 PM)   Return Visit with Alina Jennings MD   [facility] ([facility])    [address]  [address] [facility] [address]   [phone]            Sep 08, 2017 10:00 AM CDT   (Arrive by 9:45 AM)   New Patient Visit with VICTOR [facility] ([facility])    [address]  [address] [facility] [address]   [phone]            Nov 10, 2017  9:20 AM CST   (Arrive by 9:05 AM)   RETURN DIABETES with Michelle Irizarry MD   [facility] ([facility])    [address] [place] [address]   [phone]              Who to contact     If you have questions or need follow up information about today's clinic visit or your schedule please contact [facility] directly at [phone].  Normal or non-critical lab and imaging results will be communicated to you by MyChart, letter or phone within 4 business days after the clinic has received the results. If you do not hear from us within 7 days, please contact the clinic through [employer] [employer] or phone. If you have a critical or abnormal lab result, we will notify you by phone as soon as possible.  Submit refill requests through [employer] or call your pharmacy and they will forward the refill request to us. Please allow 3 business days for your refill to be completed.          Additional Information About Your Visit        WeMonitor Information     [employer] lets you send messages to your doctor, view your test results, renew your prescriptions, schedule appointments and more. To sign up, go to [email or web address] . Click on \"Log in\" on the left side of the screen, which will take you to the Welcome page. Then click on \"Sign up " "Now\" on the right side of the page.     You will be asked to enter the access code listed below, as well as some personal information. Please follow the directions to create your username and password.     Your access code is: [email or web address]  Expires: 2017 11:52 AM     Your access code will  in 90 days. If you need help or a new code, please call your [facility] clinic or [phone].        Care EveryWhere ID     This is your Care EveryWhere ID. This could be used by other organizations to access your [facility] medical records  [id number]        Your Vitals Were     Pulse Temperature Pulse Oximetry             83 98  F (36.7  C) (Oral) 96%          Blood Pressure from Last 3 Encounters:   06/15/17 131/81   17 147/75   17 174/73    Weight from Last 3 Encounters:   17 136 lb 3.9 oz (61.8 kg)   17 133 lb (60.3 kg)   05/10/17 133 lb (60.3 kg)              We Performed the Following     MENTAL HEALTH REFERRAL          Today's Medication Changes          These changes are accurate as of: 6/15/17 11:21 AM.  If you have any questions, ask your nurse or doctor.               Start taking these medicines.        Dose/Directions    cyclobenzaprine 10 MG tablet   Commonly known as:  FLEXERIL   Used for:  Cervicalgia   Started by:  Allan Casey MD        Dose:  5-10 mg   Take 0.5-1 tablets (5-10 mg) by mouth 3 times daily as needed for muscle spasms   Quantity:  30 tablet   Refills:  1       umeclidinium 62.5 MCG/INH oral inhaler   Commonly known as:  INCRUSE ELLIPTA   Used for:  Chronic obstructive pulmonary disease, unspecified COPD type (H)   Started by:  Allan Casey MD        Dose:  1 puff   Inhale 1 puff into the lungs daily   Quantity:  3 Inhaler   Refills:  3         These medicines have changed or have updated prescriptions.        Dose/Directions    aspirin 81 MG EC tablet   This may have changed:  when to take this   Used for:  Unstable angina (H)        Dose:  81 mg   Take 1 " tablet (81 mg) by mouth daily   Quantity:  90 tablet   Refills:  3       atorvastatin 40 MG tablet   Commonly known as:  LIPITOR   This may have changed:  when to take this   Used for:  Hyperlipidemia LDL goal <100        Dose:  40 mg   Take 1 tablet (40 mg) by mouth daily   Quantity:  90 tablet   Refills:  3       hydrochlorothiazide 12.5 MG capsule   Commonly known as:  MICROZIDE   This may have changed:  additional instructions   Used for:  Essential hypertension with goal blood pressure less than 130/80        Dose:  12.5 mg   Take 1 capsule (12.5 mg) by mouth daily   Quantity:  90 capsule   Refills:  3       ondansetron 4 MG ODT tab   Commonly known as:  ZOFRAN-ODT   This may have changed:    - when to take this  - reasons to take this   Used for:  Intractable vomiting with nausea, unspecified vomiting type        Dose:  4 mg   Take 1 tablet (4 mg) by mouth every 6 hours   Quantity:  120 tablet   Refills:  0       * order for DME   This may have changed:  Another medication with the same name was added. Make sure you understand how and when to take each.   Used for:  Incontinence   Changed by:  Allan Casey MD        Equipment being ordered: Depends briefs   Quantity:  1 Month   Refills:  11       * order for DME   This may have changed:  Another medication with the same name was added. Make sure you understand how and when to take each.   Used for:  CVA (cerebral infarction), HTN (hypertension)   Changed by:  Allan Casey MD        Equipment being ordered: Power Wheelchair   Quantity:  1 Device   Refills:  0       * order for DME   This may have changed:  Another medication with the same name was added. Make sure you understand how and when to take each.   Used for:  Obstructive chronic bronchitis with exacerbation (H)   Changed by:  Servando Martinez, EDWARDO        Equipment being ordered: Nebulizer and supplies   Quantity:  1 Units   Refills:  0       * order for DME   This may have changed:  Another  medication with the same name was added. Make sure you understand how and when to take each.   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 Make sure you understand how and when to take each.   Used for:  Type 2 diabetes mellitus with diabetic peripheral angiopathy without gangrene, without long-term current use of insulin (H)   Changed by:  Allan Casey MD        ACcu check BID   Quantity:  1 Device   Refills:  0       * order for DME   This may have changed:  Another medication with the same name was added. Make sure you understand how and when to take each.   Used for:  Simple chronic bronchitis (H)   Changed by:  Alina Jennings MD        Equipment being ordered: Nebulizer and tubing supplies   Quantity:  1 Units   Refills:  0       * order for DME   This may have changed:  Another medication with the same name was added. Make sure you understand how and when to take each.   Used for:  Chronic obstructive pulmonary disease, unspecified COPD type (H)   Changed by:  Allan Casey MD        Equipment being ordered: CPAP supplies mask, hose, filters, cushion fax to [facility] at [phone] Disp: 10 DeviceRefills: prn Class: Local PrintStart: 2/10/2017   Quantity:  1 Device   Refills:  0       * order for DME   This may have changed:  Another medication with the same name was added. Make sure you understand how and when to take each.   Used for:  COPD (chronic obstructive pulmonary disease) (H)   Changed by:  Jamie Sharma RN        Equipment being ordered: CPAP supplies mask, hose, filters, cushion  fax to [facility] at [phone]   Quantity:  10 Device   Refills:  prn       * order for DME   This may have  "changed:  Another medication with the same name was added. Make sure you understand how and when to take each.   Used for:  Critical lower limb ischemia   Changed by:  Allan Casey MD        Equipment being ordered: wheelchair seat cushion   Quantity:  1 Device   Refills:  0       * order for DME   This may have changed:  You were already taking a medication with the same name, and this prescription was added. Make sure you understand how and when to take each.   Used for:  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 (H)   Changed by:  Allan Casey MD        roho w/c cushion 18\" X 18\" for pressure relief   Quantity:  1 Device   Refills:  0       * order for DME   This may have changed:  You were already taking a medication with the same name, and this prescription was added. Make sure you understand how and when to take each.   Used for:  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 (H)   Changed by:  Allan Casey MD        Hospital bed with side rails   Quantity:  1 Device   Refills:  0       Phenytoin Sodium Extended 200 MG Caps   This may have changed:  additional instructions   Used for:  Seizure disorder (H)        Dose:  200 mg   Take 200 mg by mouth 2 times daily   Quantity:  60 capsule   Refills:  0       * Notice:  This list has 11 medication(s) that are the same as other medications prescribed for you. Read the directions carefully, and ask your doctor or other care provider to review them with you.      Stop taking these medicines if you haven't already. Please contact your care team if you have questions.     SPIRIVA HANDIHALER 18 MCG capsule   Generic drug:  tiotropium   Stopped by:  Allan Casey MD                Where to get your medicines      These medications were sent to [address] [address]     Phone:  [phone]     cyclobenzaprine 10 MG tablet         These " medications were sent to [facility] [place] - [address]  [address], [place] [address]     Phone:  [phone]     umeclidinium 62.5 MCG/INH oral inhaler         Some of these will need a paper prescription and others can be bought over the counter.  Ask your nurse if you have questions.     Bring a paper prescription for each of these medications     order for DME    order for DME                Primary Care Provider Office Phone # Fax #    Allan Casey [phone]       [facility] [address]  [facility] [address]        Thank you!     Thank you for choosing [facility]  for your care. Our goal is always to provide you with excellent care. Hearing back from our patients is one way we can continue to improve our services. Please take a few minutes to complete the written survey that you may receive in the mail after your visit with us. Thank you!             Your Updated Medication List - Protect others around you: Learn how to safely use, store and throw away your medicines at www.disposemymeds.org.          This list is accurate as of: 6/15/17 11:21 AM.  Always use your most recent med list.                   Brand Name Dispense Instructions for use    ACETAMINOPHEN PO      Take 1,000 mg by mouth every 6 hours as needed for fever Taking q4-6 hours, per MAR       ADVAIR DISKUS 250-50 MCG/DOSE diskus inhaler   Generic drug:  fluticasone-salmeterol      Inhale 1 puff into the lungs 2 times daily       albuterol (2.5 MG/3ML) 0.083% neb solution     360 mL    INHALE 1 VIAL VIA NEBULIZER EVERY 6 HOURS AS NEEDED       aspirin 81 MG EC tablet     90 tablet    Take 1 tablet (81 mg) by mouth daily       atorvastatin 40 MG tablet    LIPITOR    90 tablet    Take 1 tablet (40 mg) by mouth daily       blood glucose monitoring meter device kit     1 kit    Use to test blood sugars 3 times daily or  as directed.       blood glucose monitoring test strip    ONE TOUCH ULTRA    3 Box    Use to test blood sugars 3 times daily or as directed.       calcium citrate-vitamin D 315-250 MG-UNIT Tabs per tablet    CITRACAL    120 tablet    Take 2 tablets by mouth daily       cetirizine 10 MG tablet    zyrTEC    90 tablet    Take 1 tablet (10 mg) by mouth daily as needed for allergies       clopidogrel 75 MG tablet    PLAVIX    90 tablet    Take 1 tablet (75 mg) by mouth daily       CYANOCOBALAMIN PO      Take 2,000 mcg by mouth daily       cyclobenzaprine 10 MG tablet    FLEXERIL    30 tablet    Take 0.5-1 tablets (5-10 mg) by mouth 3 times daily as needed for muscle spasms       diclofenac 1 % Gel topical gel    VOLTAREN    100 g    Apply 2 g topically 4 times daily to hands       dorzolamide 2 % ophthalmic solution    TRUSOPT     Place 1 drop into both eyes 2 times daily       * EASY TOUCH LANCETS 30G/TWIST Misc          * thin lancets    NO BRAND SPECIFIED    1 Box    Use to test blood sugar 3 times daily or as directed.       * blood glucose monitoring lancets     3 Box    Use to test blood sugar 3 times daily or as directed.       EPINEPHrine 0.15 MG/0.3ML injection    EPIPEN JR    0.6 mL    Inject 0.3 mLs (0.15 mg) into the muscle as needed for anaphylaxis       ESCITALOPRAM OXALATE PO      Take 10 mg by mouth daily       estradiol 1 MG tablet    ESTRACE    90 tablet    Take 1 tablet (1 mg) by mouth daily       ferrous sulfate 325 (65 FE) MG tablet    IRON    60 tablet    Take 1 tablet (325 mg) by mouth 2 times daily With meals       fluticasone 50 MCG/ACT spray    FLONASE     Spray 1 spray into both nostrils daily       hydrochlorothiazide 12.5 MG capsule    MICROZIDE    90 capsule    Take 1 capsule (12.5 mg) by mouth daily       HYDROmorphone 2 MG tablet    DILAUDID    30 tablet    Take 1 tablet (2 mg) by mouth every 3 hours as needed for moderate to severe pain       RENÉE Pack      Take 1 packet by mouth 2 times  daily       latanoprost 0.005 % ophthalmic solution    XALATAN    2.5 mL    Place 1 drop into both eyes At Bedtime       levETIRAcetam 500 MG tablet    KEPPRA    180 tablet    Take 1 tablet (500 mg) by mouth 2 times daily       lisinopril 10 MG tablet    PRINIVIL/ZESTRIL    90 tablet    Take 1 tablet (10 mg) by mouth daily       MEDICATION GIVEN BY INTRATHECAL PUMP - INSTRUCTION      continuous May 19, 2017 - per [facility] [place] [phone]: Conc: Bupivacaine 20 mg/mL and Fentanyl 2000 mcg/mL. Continuous: Bupivacaine 7.265 mg/day and Fentanyl 726.5 mcg/day. Boluses: Up to 7 boluses per 24-hr period of Bupivicaine 0.599 mg and Fentanyl 59.9 mcg  Pump Refill Date at [facility]: 7/2/17       metoprolol 25 MG 24 hr tablet    TOPROL-XL    30 tablet    Take 1 tablet (25 mg) by mouth daily       MULTIVITAMIN PO      Take 1 tablet by mouth daily       nitroglycerin 0.4 MG sublingual tablet    NITROSTAT    25 tablet    Place 1 tablet (0.4 mg) under the tongue every 5 minutes as needed for chest pain if you are still having symptoms after 3 doses (15 minutes) call 911.       ondansetron 4 MG ODT tab    ZOFRAN-ODT    120 tablet    Take 1 tablet (4 mg) by mouth every 6 hours       * order for DME     1 Month    Equipment being ordered: Depends briefs       * order for DME     1 Device    Equipment being ordered: Power Wheelchair       * order for DME     1 Units    Equipment being ordered: Nebulizer and supplies       * order for DME     1 Box    Equipment being ordered: Glucerna daily shakes with each meal       * order for DME     1 Device    ACcu check BID       * order for DME     1 Units    Equipment being ordered: Nebulizer and tubing supplies       * order for DME     1 Device    Equipment being ordered: CPAP supplies mask, hose, filters, cushion fax to [facility] at [phone] Disp: 10 DeviceRefills: prn Class: Local PrintStart: 2/10/2017       * order for DME     10 Device  "   Equipment being ordered: CPAP supplies mask, hose, filters, cushion  fax to [facility] at [phone]       * order for DME     1 Device    Equipment being ordered: wheelchair seat cushion       * order for DME     1 Device    roho w/c cushion 18\" X 18\" for pressure relief       * order for DME     1 Device    Hospital bed with side rails       pantoprazole 40 MG EC tablet    PROTONIX     Take 40 mg by mouth daily       Phenytoin Sodium Extended 200 MG Caps     60 capsule    Take 200 mg by mouth 2 times daily       polyethylene glycol Packet    MIRALAX/GLYCOLAX     Take 17 g by mouth daily Dissolved in water or juice       pregabalin 75 MG capsule    LYRICA    120 capsule    Take 2 capsules (150 mg) by mouth 2 times daily       prochlorperazine 10 MG tablet    COMPAZINE    20 tablet    Take 1 tablet (10 mg) by mouth every 8 hours as needed       risperiDONE 0.5 MG tablet    risperDAL     Take 0.5 mg by mouth At Bedtime       sucralfate 1 GM tablet    CARAFATE    40 tablet    Take 1 tablet (1 g) by mouth 4 times daily May dissolve in 10 mL water is necessary. (Start upon completion of carafate suspension)       traZODone 100 MG tablet    DESYREL    90 tablet    Take 1 tablet (100 mg) by mouth At Bedtime       umeclidinium 62.5 MCG/INH oral inhaler    INCRUSE ELLIPTA    3 Inhaler    Inhale 1 puff into the lungs daily       vitamin D 2000 UNITS tablet     100 tablet    Take 2,000 Units by mouth daily.       zinc 50 MG Tabs      Take 1 tablet by mouth daily       * Notice:  This list has 14 medication(s) that are the same as other medications prescribed for you. Read the directions carefully, and ask your doctor or other care provider to review them with you.      "

## 2017-06-15 NOTE — NURSING NOTE
"Chief Complaint   Patient presents with     Recheck Medication     Depression       Initial /81  Pulse 83  Temp 98  F (36.7  C) (Oral)  SpO2 96% Estimated body mass index is 21.34 kg/(m^2) as calculated from the following:    Height as of 5/31/17: 5' 7\" (1.702 m).    Weight as of 6/1/17: 136 lb 3.9 oz (61.8 kg).  Medication Reconciliation: complete   Daniela Hancock CMA      "

## 2017-06-15 NOTE — PROGRESS NOTES
SUBJECTIVE:                                                    Sonya Foote is a 67 year old female who presents to clinic today for the following health issues:      Mood Symptoms      Duration: 5 days     Description:  Depression: YES- grief with partners passing. Crying frequently, having a hard time eating and sleeping   Anxiety: YES  Panic attacks: no     Accompanying signs and symptoms: see PHQ-9 and ROGER scores    History (similar episodes/previous evaluation): None    Precipitating or alleviating factors: Life partner recently passed away     Therapies tried and outcome: none     Other concerns:  1. Requesting refills on flexeril for back spasm     Problem list and histories reviewed & adjusted, as indicated.  Additional history: as documented    Patient Active Problem List   Diagnosis     Hyperlipidemia LDL goal <100     Seizure disorder (H)     ACP (advance care planning)     Osteoporosis     Schizoaffective disorder (H)     AS (sickle cell trait) (H)     Vertigo     Person who has had sex change operation     Claudication in peripheral vascular disease (H)     Intestinal malabsorption     GIB (gastrointestinal bleeding)     Cervicalgia     Health Care Home     Asthma     Adjustment disorder with depressed mood     Chronic pain syndrome     Open-angle glaucoma     History of colonic polyps     Old myocardial infarction     Iron deficiency anemia     Late effects of cerebrovascular disease     Degeneration of intervertebral disc of lumbosacral region     Thoracic or lumbosacral neuritis or radiculitis     Cerebral infarction due to occlusion or stenosis of carotid artery     Disorder of bone and cartilage     Hereditary and idiopathic peripheral neuropathy     Androgen insensitivity syndrome     PAD (peripheral artery disease) (H)     Chronic systolic congestive heart failure (H)     Carotid stenosis, left     Primary osteoarthritis of both hands     Pain in both upper extremities     Atherosclerotic  peripheral vascular disease with rest pain (H)     Essential hypertension with goal blood pressure less than 130/80     Cellulitis of right ankle     Angina pectoris, crescendo (H)     Type 2 diabetes mellitus with diabetic peripheral angiopathy without gangrene, without long-term current use of insulin (H)     Anemia, unspecified type     Critical lower limb ischemia     Testicular feminization     Anxiety disorder due to general medical condition     Anxiety disorder     Central retinal artery occlusion     Lumbosacral radiculitis     Peripheral nerve disease (H)     Osteopenia     Prediabetes     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 (H)     Primary open angle glaucoma of both eyes, severe stage     Pseudophakia of right eye     Cataract, left eye     Diabetes mellitus type 2 without retinopathy (H)     Pyelonephritis     Chronic obstructive pulmonary disease, unspecified COPD type (H)     JOSE (obstructive sleep apnea)     Complex sleep apnea syndrome     Coronary artery disease of native artery of native heart with stable angina pectoris (H)     Hyperlipidemia LDL goal <70     Ischemic cardiomyopathy     Bee sting reaction, undetermined intent, subsequent encounter     Past Surgical History:   Procedure Laterality Date     AMPUTATE LEG ABOVE KNEE Left 6/11/2016    Procedure: AMPUTATE LEG ABOVE KNEE;  Surgeon: Mello Rodriguez MD;  [facility]     AMPUTATE LEG BELOW KNEE Right 11/7/2016    Procedure: AMPUTATE LEG BELOW KNEE;  Surgeon: Savannah Durant MD;  [facility]     AMPUTATE REVISION STUMP LOWER EXTREMITY Right 11/11/2016    Procedure: AMPUTATE REVISION STUMP LOWER EXTREMITY;  Surgeon: Savannah Durant MD;  [facility]     AMPUTATE REVISION STUMP LOWER EXTREMITY Right 11/16/2016    Procedure: AMPUTATE REVISION STUMP LOWER EXTREMITY;  Surgeon: Savannah Durant MD;  [facility]     AMPUTATE TOE(S) Right 1/5/2016    Procedure: AMPUTATE TOE(S);  Surgeon: Mello Gaines DPM;   Location: [facility]     ANGIOGRAM Bilateral 11/21/2014    Procedure: ANGIOGRAM;  Surgeon: Savannah Durant MD;  [facility]     ANGIOGRAM Left 1/16/2015    Procedure: ANGIOGRAM;  Surgeon: Savannah Durant MD;  [facility]     ANGIOGRAM Bilateral 9/14/2015    Procedure: ANGIOGRAM;  Surgeon: Savannah Durant MD;  [facility]     ANGIOGRAM Left 10/12/2015    Procedure: ANGIOGRAM;  Surgeon: Savannah Durant MD;  [facility]     ANGIOGRAM Right 6/6/2016    Procedure: ANGIOGRAM;  Surgeon: Savannah Durant MD;  [facility]     ANGIOPLASTY Right 6/6/2016    Procedure: ANGIOPLASTY;  Surgeon: Savannah Durant MD;  [facility]     APPENDECTOMY       BREAST SURGERY      right breast bx (benign)     CHOLECYSTECTOMY       COLONOSCOPY N/A 8/25/2014    Procedure: COLONOSCOPY;  Surgeon: Mello Ferrer MD;  [facility]     COLONOSCOPY WITH CO2 INSUFFLATION N/A 8/20/2014    Procedure: COLONOSCOPY WITH CO2 INSUFFLATION;  Surgeon: Duane, William Charles, MD;  [facility]     ENDARTERECTOMY FEMORAL  5/23/2014    Procedure: ENDARTERECTOMY FEMORAL;  Surgeon: Jason Joshi MD;  [facility]     ESOPHAGOSCOPY, GASTROSCOPY, DUODENOSCOPY (EGD), COMBINED  12/14/2012    Procedure: COMBINED ESOPHAGOSCOPY, GASTROSCOPY, DUODENOSCOPY (EGD), BIOPSY SINGLE OR MULTIPLE;  ESOPHAGOSCOPY, GASTROSCOPY, DUODENOSCOPY (EGD), DILATATION ;  Surgeon: Elizabeth Stevenson MD;  Location:  GI     ESOPHAGOSCOPY, GASTROSCOPY, DUODENOSCOPY (EGD), COMBINED  12/31/2013    Procedure: COMBINED ESOPHAGOSCOPY, GASTROSCOPY, DUODENOSCOPY (EGD), BIOPSY SINGLE OR MULTIPLE;;  Surgeon: Clemente Lopez MD;  [facility]     ESOPHAGOSCOPY, GASTROSCOPY, DUODENOSCOPY (EGD), COMBINED  4/1/2014    Procedure: COMBINED ESOPHAGOSCOPY, GASTROSCOPY, DUODENOSCOPY (EGD);;  Surgeon: Clemente Lopez MD;  Location:  [facility]     ESOPHAGOSCOPY, GASTROSCOPY, DUODENOSCOPY (EGD), COMBINED  6/28/2014    Procedure: COMBINED ESOPHAGOSCOPY, GASTROSCOPY, DUODENOSCOPY (EGD);   Surgeon: Clemente Lopez MD;  [facility]     ESOPHAGOSCOPY, GASTROSCOPY, DUODENOSCOPY (EGD), COMBINED N/A 8/20/2014    Procedure: COMBINED ESOPHAGOSCOPY, GASTROSCOPY, DUODENOSCOPY (EGD), BIOPSY SINGLE OR MULTIPLE;  Surgeon: Duane, William Charles, MD;  [facility]     ESOPHAGOSCOPY, GASTROSCOPY, DUODENOSCOPY (EGD), COMBINED N/A 8/22/2014    Procedure: COMBINED ESOPHAGOSCOPY, GASTROSCOPY, DUODENOSCOPY (EGD), BIOPSY SINGLE OR MULTIPLE;  Surgeon: Mello Ferrer MD;  [facility]     ESOPHAGOSCOPY, GASTROSCOPY, DUODENOSCOPY (EGD), COMBINED N/A 10/2/2014    Procedure: COMBINED ESOPHAGOSCOPY, GASTROSCOPY, DUODENOSCOPY (EGD), BIOPSY SINGLE OR MULTIPLE;  Surgeon: Remy Haskins MD;  [facility]     ESOPHAGOSCOPY, GASTROSCOPY, DUODENOSCOPY (EGD), COMBINED Left 12/15/2014    Procedure: COMBINED ESOPHAGOSCOPY, GASTROSCOPY, DUODENOSCOPY (EGD), BIOPSY SINGLE OR MULTIPLE;  Surgeon: Remy Haskins MD;  [facility]     ESOPHAGOSCOPY, GASTROSCOPY, DUODENOSCOPY (EGD), COMBINED N/A 2/25/2015    Procedure: COMBINED ENDOSCOPIC ULTRASOUND, ESOPHAGOSCOPY, GASTROSCOPY, DUODENOSCOPY (EGD), FINE NEEDLE ASPIRATE/BIOPSY;  Surgeon: Clemente Lugo MD;  [facility]     ESOPHAGOSCOPY, GASTROSCOPY, DUODENOSCOPY (EGD), COMBINED Left 2/25/2015    Procedure: COMBINED ESOPHAGOSCOPY, GASTROSCOPY, DUODENOSCOPY (EGD), BIOPSY SINGLE OR MULTIPLE;  Surgeon: Clemente Lugo MD;  [facility]     ESOPHAGOSCOPY, GASTROSCOPY, DUODENOSCOPY (EGD), COMBINED N/A 9/25/2016    Procedure: COMBINED ESOPHAGOSCOPY, GASTROSCOPY, DUODENOSCOPY (EGD);  Surgeon: Aziza Patiño MD;  [facility]     ESOPHAGOSCOPY, GASTROSCOPY, DUODENOSCOPY (EGD), COMBINED N/A 1/18/2017    Procedure: COMBINED ESOPHAGOSCOPY, GASTROSCOPY, DUODENOSCOPY (EGD), BIOPSY SINGLE OR MULTIPLE;  Surgeon: Clemente Lopez MD;  [facility]     FASCIOTOMY LOWER EXTREMITY Left 6/10/2016    Procedure: FASCIOTOMY LOWER EXTREMITY;  Surgeon: Mello Rodriguez  MD Todd;  [facility]     HC CAPSULE ENDOSCOPY N/A 8/25/2014    Procedure: CAPSULE/PILL CAM ENDOSCOPY;  Surgeon: Remy Haskins MD;  [facility]     HC CAPSULE ENDOSCOPY N/A 10/2/2014    Procedure: CAPSULE/PILL CAM ENDOSCOPY;  Surgeon: Remy Haskins MD;  [facility]     ORTHOPEDIC SURGERY      broken wrist repair     SEX TRANSFORMATION SURGERY, MALE TO FEMALE      1974     SINUS SURGERY      cyst removed     TONSILLECTOMY       VASCULAR SURGERY      Left carotid stent       Social History   Substance Use Topics     Smoking status: Former Smoker     Packs/day: 2.50     Years: 30.00     Types: Cigarettes, Cigars     Quit date: 11/1/2000     Smokeless tobacco: Never Used     Alcohol use No     Family History   Problem Relation Age of Onset     Dementia Mother      Glaucoma Mother      DIABETES Mother      Coronary Artery Disease Mother      MI     Glaucoma Father      DIABETES Father      Heart Failure Father      CANCER Maternal Aunt      leukemia     Schizophrenia Brother      Depression Brother      Suicide Sister      Depression Sister      DIABETES Sister      CANCER Maternal Aunt      ovarian     Glaucoma Maternal Grandmother      DIABETES Maternal Grandmother      Glaucoma Maternal Grandfather      DIABETES Maternal Grandfather      Glaucoma Paternal Grandmother      DIABETES Paternal Grandmother      Glaucoma Paternal Grandfather      DIABETES Paternal Grandfather      Breast Cancer Sister      CEREBROVASCULAR DISEASE Brother          Current Outpatient Prescriptions   Medication Sig Dispense Refill     cyclobenzaprine (FLEXERIL) 10 MG tablet Take 0.5-1 tablets (5-10 mg) by mouth 3 times daily as needed for muscle spasms 30 tablet 1     polyethylene glycol (MIRALAX/GLYCOLAX) Packet Take 17 g by mouth daily Dissolved in water or juice       ESCITALOPRAM OXALATE PO Take 10 mg by mouth daily       ACETAMINOPHEN PO Take 1,000 mg by mouth every 6 hours as needed for fever Taking q4-6  hours, per MAR       pregabalin (LYRICA) 75 MG capsule Take 2 capsules (150 mg) by mouth 2 times daily 120 capsule 5     metoprolol (TOPROL-XL) 25 MG 24 hr tablet Take 1 tablet (25 mg) by mouth daily 30 tablet 0     cetirizine (ZYRTEC) 10 MG tablet Take 1 tablet (10 mg) by mouth daily as needed for allergies 90 tablet 3     diclofenac (VOLTAREN) 1 % GEL topical gel Apply 2 g topically 4 times daily to hands 100 g 11     EPINEPHrine (EPIPEN JR) 0.15 MG/0.3ML injection Inject 0.3 mLs (0.15 mg) into the muscle as needed for anaphylaxis 0.6 mL 1     lisinopril (PRINIVIL/ZESTRIL) 10 MG tablet Take 1 tablet (10 mg) by mouth daily 90 tablet 3     pantoprazole (PROTONIX) 40 MG EC tablet Take 40 mg by mouth daily       risperiDONE (RISPERDAL) 0.5 MG tablet Take 0.5 mg by mouth At Bedtime       ferrous sulfate (IRON) 325 (65 FE) MG tablet Take 1 tablet (325 mg) by mouth 2 times daily With meals 60 tablet 2     blood glucose monitoring (ONE TOUCH ULTRA) test strip Use to test blood sugars 3 times daily or as directed. 3 Box 3     order for DME Equipment being ordered: wheelchair seat cushion 1 Device 0     order for DME Equipment being ordered: CPAP supplies mask, hose, filters, cushion    fax to [facility] at [phone] 10 Device prn     sucralfate (CARAFATE) 1 GM tablet Take 1 tablet (1 g) by mouth 4 times daily May dissolve in 10 mL water is necessary. (Start upon completion of carafate suspension) 40 tablet 5     order for DME Equipment being ordered: CPAP supplies mask, hose, filters, cushion fax to [facility] at [phone]  Disp: 10 Device Refills: prn   Class: Local Print Start: 2/10/2017 1 Device 0     order for DME Equipment being ordered: Nebulizer and tubing supplies 1 Units 0     albuterol (2.5 MG/3ML) 0.083% neb solution INHALE 1 VIAL VIA NEBULIZER EVERY 6 HOURS AS NEEDED 360 mL 11     order for DME ACcu check BID 1 Device 0     ondansetron (ZOFRAN-ODT) 4 MG ODT tab Take 1 tablet (4 mg) by mouth every  6 hours (Patient taking differently: Take 4 mg by mouth every 6 hours as needed ) 120 tablet 0     CYANOCOBALAMIN PO Take 2,000 mcg by mouth daily       Nutritional Supplements (RENÉE) PACK Take 1 packet by mouth 2 times daily       HYDROmorphone (DILAUDID) 2 MG tablet Take 1 tablet (2 mg) by mouth every 3 hours as needed for moderate to severe pain 30 tablet 0     fluticasone (FLONASE) 50 MCG/ACT nasal spray Spray 1 spray into both nostrils daily       ADVAIR DISKUS 250-50 MCG/DOSE diskus inhaler Inhale 1 puff into the lungs 2 times daily        calcium citrate-vitamin D (CITRACAL) 315-250 MG-UNIT TABS Take 2 tablets by mouth daily 120 tablet 5     hydrochlorothiazide (MICROZIDE) 12.5 MG capsule Take 1 capsule (12.5 mg) by mouth daily (Patient taking differently: Take 12.5 mg by mouth daily Hold for sbp<90) 90 capsule 3     estradiol (ESTRACE) 1 MG tablet Take 1 tablet (1 mg) by mouth daily 90 tablet 3     levETIRAcetam (KEPPRA) 500 MG tablet Take 1 tablet (500 mg) by mouth 2 times daily 180 tablet 1     traZODone (DESYREL) 100 MG tablet Take 1 tablet (100 mg) by mouth At Bedtime 90 tablet 3     aspirin EC 81 MG EC tablet Take 1 tablet (81 mg) by mouth daily (Patient taking differently: Take 81 mg by mouth every morning ) 90 tablet 3     clopidogrel (PLAVIX) 75 MG tablet Take 1 tablet (75 mg) by mouth daily 90 tablet 3     blood glucose monitoring (ONE TOUCH ULTRA 2) meter device kit Use to test blood sugars 3 times daily or as directed. 1 kit 0     blood glucose monitoring (ONE TOUCH ULTRASOFT) lancets Use to test blood sugar 3 times daily or as directed. 3 Box 3     phenytoin 200 MG CAPS Take 200 mg by mouth 2 times daily (Patient taking differently: Take 200 mg by mouth 2 times daily (takes at 8 AM and 8 PM)) 60 capsule 0     dorzolamide (TRUSOPT) 2 % ophthalmic solution Place 1 drop into both eyes 2 times daily        SPIRIVA HANDIHALER 18 MCG inhalation capsule INHALE THE CONTENTS OF 1 CAPSULE VIA INHALATION  DEVICE DAILY 30 capsule 2     zinc 50 MG TABS Take 1 tablet by mouth daily       nitroglycerin (NITROSTAT) 0.4 MG SL tablet Place 1 tablet (0.4 mg) under the tongue every 5 minutes as needed for chest pain if you are still having symptoms after 3 doses (15 minutes) call 911. 25 tablet 1     MEDICATION GIVEN BY INTRATHECAL PUMP - INSTRUCTION continuous May 19, 2017 - per [facility] in [place] [phone]:  Conc: Bupivacaine 20 mg/mL and Fentanyl 2000 mcg/mL.  Continuous: Bupivacaine 7.265 mg/day and Fentanyl 726.5 mcg/day.  Boluses: Up to 7 boluses per 24-hr period of Bupivicaine 0.599 mg and Fentanyl 59.9 mcg    Pump Refill Date at [facility]: 7/2/17       latanoprost (XALATAN) 0.005 % ophthalmic solution Place 1 drop into both eyes At Bedtime 2.5 mL 11     atorvastatin (LIPITOR) 40 MG tablet Take 1 tablet (40 mg) by mouth daily (Patient taking differently: Take 40 mg by mouth At Bedtime ) 90 tablet 3     order for DME Equipment being ordered: Glucerna daily shakes with each meal 1 Box 11     prochlorperazine (COMPAZINE) 10 MG tablet Take 1 tablet (10 mg) by mouth every 8 hours as needed 20 tablet 0     thin (NO BRAND SPECIFIED) lancets Use to test blood sugar 3 times daily or as directed. 1 Box 10     ORDER FOR DME Equipment being ordered: Nebulizer and supplies 1 Units 0     ORDER FOR DME Equipment being ordered: Power Wheelchair 1 Device 0     ORDER FOR DME Equipment being ordered: Depends briefs 1 Month 11     EASY TOUCH LANCETS 30G/TWIST MISC        Cholecalciferol (VITAMIN D) 2000 UNITS tablet Take 2,000 Units by mouth daily. 100 tablet 3     Multiple Vitamin (MULTIVITAMIN OR) Take 1 tablet by mouth daily        Allergies   Allergen Reactions     Bee Venom      Penicillins Anaphylaxis     Other reaction(s): Skin Rash and/or Hives     Dilantin [Phenytoin] Other (See Comments)     Generic dilantin only per pt     Iodide Hives     09/11/15: Pt states she can use iodine and  doesn't have any problems      Iodine-131      Novocaine [Procaine] Hives     Other reaction(s): Skin Rash and/or Hives     BP Readings from Last 3 Encounters:   06/15/17 131/81   06/02/17 147/75   05/19/17 174/73    Wt Readings from Last 3 Encounters:   06/01/17 136 lb 3.9 oz (61.8 kg)   05/19/17 133 lb (60.3 kg)   05/10/17 133 lb (60.3 kg)                    Reviewed and updated as needed this visit by clinical staff  Tobacco  Allergies  Med Hx  Surg Hx  Fam Hx  Soc Hx      Reviewed and updated as needed this visit by Provider       ROS:  C: NEGATIVE for fever, chills, change in weight  E/M: NEGATIVE for ear, mouth and throat problems  R: NEGATIVE for significant cough or SOB  CV: NEGATIVE for chest pain, palpitations  GI: NEGATIVE for nausea, abdominal pain, heartburn, or change in bowel habits  H: NEGATIVE for bleeding problems    OBJECTIVE:                                                    /81  Pulse 83  Temp 98  F (36.7  C) (Oral)  SpO2 96%  There is no height or weight on file to calculate BMI.  GENERAL:  alert and in wheelchair, BKA's noted  EYES: Eyes grossly normal to inspection, extraocular movements - intact, and PERRL  HENT: ear canals- normal; TMs- normal; Nose- normal; Mouth- no ulcers, no lesions  NECK: no tenderness, no adenopathy, no asymmetry, no masses, no stiffness; thyroid- normal to palpation  RESP: lungs clear to auscultation - no rales, no rhonchi, no wheezes  CV: regular rates and rhythm, normal S1 S2, no S3 or S4 and no click or rub   PSYCH: flat affect       ASSESSMENT/PLAN:                                                      (F43.20) Grief reaction  (primary encounter diagnosis)  Comment: appears as grief reaction due to loss of significant other  Plan: MENTAL HEALTH REFERRAL        On SSRI, suggested grief counseling thus referral sent    (M54.2) Cervicalgia  Comment: refilled per request  Plan: cyclobenzaprine (FLEXERIL) 10 MG tablet            (G40.909) Seizure  "disorder (H)  Comment: stable on therapy, no changes top therapy  Plan:     (J44.9) Chronic obstructive pulmonary disease, unspecified COPD type (H)  Comment: alternative for non covered Spiriva  Plan: umeclidinium (INCRUSE ELLIPTA) 62.5 MCG/INH         oral inhaler            (Z89.511) 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 (H)  Comment: request per OT for hospital bed as well as seat cushion.  Plan: order for DME, order for DME given to patient.        Patient needs as requested per OT face to face in office visit for a roho w/c cushion 18\" X 18\" for pressure relief and a hospital bed for recurrent sacral irritation in setting of noted BKA's.  Agree for need..    Patient requires positioning of the body in ways not feasible with an ordinary bed because of history bilateral leg amputation. Patient has a history of chronic pain syndrome and would benefit with alleviation of pain from positioning of body in ways not feasible with ordinary bed. Patient requires that head of bed be elevated more than 30 degrees most of the time due to hypertensive heart disease with congestive heart failure and obstructive chronic bronchitis. Because of patients amputation, she requires a bed different than a fixed hospital bed to permit transfers. Patient requires frequent changes in body positioning and should be assisted by her care facility at least every 2 hours because of recurrent sacral irritation and for prevention of pressure ulcers.      See Patient Instructions    Allan Casey MD  [facility]    THE MEDICATION LIST HAS BEEN FULLY RECONCILED BY THE M.D. AND THE NURSING STAFF.    25 minutes spent with this patient, face to face, discussing treatment options for listed problems above as well as side effects of appropriate medications.  Counseling time extended beyond 50% of the clinic visit.  Medication dosing, treatment plan and follow-up were discussed. Also reviewed need for " primary care testing for patient.

## 2017-06-16 LAB — PHENYTOIN FREE SERPL-MCNC: 1.05 UG/ML

## 2017-06-16 NOTE — PROGRESS NOTES
6/16/17: No call back from Emily Ferguson at [facility]  - called and left message requesting call back.    CM placed call to member to f/u.  She states that she got out today to a parade and felt good to get out.  She states that she saw PCP yesterday and she is going to f/u with grief counselor - PCP referred her.  Informed her that CM working on [facility] worker f/u as well.     Jamie Sharma RN, PHN  [facility]

## 2017-06-20 ENCOUNTER — TELEPHONE (OUTPATIENT)
Dept: INTERNAL MEDICINE | Facility: CLINIC | Age: 67
End: 2017-06-20

## 2017-06-20 DIAGNOSIS — Z89.611 STATUS POST BILATERAL ABOVE KNEE AMPUTATION (H): Primary | ICD-10-CM

## 2017-06-20 DIAGNOSIS — Z89.612 STATUS POST BILATERAL ABOVE KNEE AMPUTATION (H): Primary | ICD-10-CM

## 2017-06-20 DIAGNOSIS — J44.89 OBSTRUCTIVE CHRONIC BRONCHITIS WITHOUT EXACERBATION (H): ICD-10-CM

## 2017-06-20 DIAGNOSIS — I11.0 HYPERTENSIVE HEART DISEASE WITH CONGESTIVE HEART FAILURE (H): ICD-10-CM

## 2017-06-20 NOTE — TELEPHONE ENCOUNTER
Fax received from [facility] stating need for faxed DME order for hospital bed. DME order pended. Office visit notes from 6/15 face to face encounter must also include the following:     Patient requires positioning of the body in ways not feasible with an ordinary bed because of history bilateral leg amputation. Patient has a history of chronic pain syndrome and would benefit with alleviation of pain from positioning of body in wags not feasible with ordinary bed. Patient requires that head of bed be elevated more than 30 degrees most of the time due to hypertensive heart disease with congestive heart failure and obstructive chronic bronchitis. Because of patients amputation, she requires a bed different than a fixed hospital bed to permit transfers. Patient requires frequent changes in body positioning, is unable to to make her own body changes, and should be assisted by her care facility at least every 2 hours because of recurrent sacral irritation and for prevention of pressure ulcers following bilateral leg amputation.

## 2017-06-20 NOTE — TELEPHONE ENCOUNTER
Spoke to pt. She has appt with grief counseling services scheduled in July. She says that she is hanging in there, and taking it day by day.   She does ask if Dr. Casey would increase the dose of her traZODone (DESYREL) 100 MG tablet for sleep. She says that she is having a hard time falling asleep, and staying asleep. She feels tired during the day, but doesn't sleep well at night.  Pharmacy is pending if PCP would like to make any med changes.

## 2017-06-21 NOTE — TELEPHONE ENCOUNTER
Patient calling back wanting to know about possible increase in Trazodone.  Told patient I would look into it and call her back.  After looking in recent office notes and talking with Dr. Casey's nurse, patient is to follow up with grief counseling and referral has been placed.  Dr. Casey did not increase patient's medication at this time.  Left message for patient to call back.  Please inform patient of information.

## 2017-06-21 NOTE — PROGRESS NOTES
6/21/17: CM placed call to Emily Marty - [facility] worker - spoke with her - she will get member reinstated on list and either herself or Nathalia will f/u with member.  Relayed this to member - she will let CM know when she is contacted.  Member states she is doing ok - tried to go out yesterday but lift on the van didn't work so she had to come back home.  She is hoping to get out tomorrow.  She states she feels best at home thinking of memories of her and Toña - discussed being careful not to isolate.     Jamie Sharma RN, N  [employer]

## 2017-06-22 DIAGNOSIS — Z89.511 STATUS POST BELOW KNEE AMPUTATION OF RIGHT LOWER EXTREMITY (H): ICD-10-CM

## 2017-06-22 DIAGNOSIS — G89.4 CHRONIC PAIN SYNDROME: ICD-10-CM

## 2017-06-22 RX ORDER — LIDOCAINE 50 MG/G
PATCH TOPICAL
Qty: 30 PATCH | Refills: 1 | Status: SHIPPED | OUTPATIENT
Start: 2017-06-22 | End: 2017-08-13

## 2017-06-22 NOTE — TELEPHONE ENCOUNTER
Routing refill request to provider for review/approval because:  Drug not active on patient's medication list

## 2017-06-27 DIAGNOSIS — Z53.9 DIAGNOSIS NOT YET DEFINED: Primary | ICD-10-CM

## 2017-06-27 PROCEDURE — G0180 MD CERTIFICATION HHA PATIENT: HCPCS | Performed by: INTERNAL MEDICINE

## 2017-06-28 ENCOUNTER — DOCUMENTATION ONLY (OUTPATIENT)
Dept: CARE COORDINATION | Facility: CLINIC | Age: 67
End: 2017-06-28

## 2017-06-28 NOTE — PROGRESS NOTES
6/28/17: CM did not receive call back - placed call to Susan for [facility] appt - appt is set for July 13 at 1 p.m.   CM relayed this to member - she put on calendar.  She states she is hanging in there - her family is calling her daily.  She states that she was out today for dr leach and felt good to be out.  She is tired now and resting.   She stated that ULISES Shine is coming out today - she is working on hospital bed.     Abi Nixon Six-Month Telephone Assessment    6 month telephone assessment completed on 6/28/17.    ER visits: Yes -  [facility]  Hospitalizations: Yes -  [facility] - 1 5/2017  TCU stays: No  Significant health status changes: No  Falls/Injuries: Yes: fall - has f/u with PCP and has PT/OT with [facility] as well  ADL/IADL changes: No  Changes in services: No    Caregiver Assessment follow up:  N/A    Goals: See POC in chart for goal progress documentation.      Will see client in 6 months for an annual health risk assessment.   Encouraged client to call CM with any questions or concerns in the meantime.     Jamie Sharma RN, PHN  [facility]            Jamie Sharma RN, PHN  [facility]

## 2017-06-28 NOTE — PROGRESS NOTES
6/26/17: ELVIRA spoke with member - she states [facility] her and gave her a date for visit but she cannot remember.  She asked CM to call.  ELVIRA placed call to Cary Goss - left voice message.     Jamie Sharma RN, N  [facility]

## 2017-06-28 NOTE — PROGRESS NOTES
[facility] now requests orders and shares plan of care/discharge summaries for some patients through [employer].  Please REPLY TO THIS MESSAGE in order to give authorization for orders when needed.  This is considered a verbal order, you will still receive a faxed copy of orders for signature.  Thank you for your assistance in improving collaboration for our patients.    ORDER    Please fax chart notes from last face to face, 6/8/17, and signed forms to [employer] at [phone] ASAP. They refaxed all paperwork and requests on 6/27/17. Patient has had multiple close calls with falling from current bed and needs hospital bed asap.    Please also fax order for 16x18 inch roho w/c cushion for pressure relief to [employer] at [phone].

## 2017-06-29 ENCOUNTER — TELEPHONE (OUTPATIENT)
Dept: INTERNAL MEDICINE | Facility: CLINIC | Age: 67
End: 2017-06-29

## 2017-06-29 NOTE — TELEPHONE ENCOUNTER
Homecare is calling requesting orders:  Skilled nursing--1w4w, 2 PRN visits.  Physical Therapy--- 2m1m.    Per standing orders, verbal order given to home care.  PCP will receive a faxed copy of orders for signature.  Estela Tapia RN

## 2017-06-30 ENCOUNTER — TELEPHONE (OUTPATIENT)
Dept: PHARMACY | Facility: CLINIC | Age: 67
End: 2017-06-30

## 2017-06-30 ENCOUNTER — TELEPHONE (OUTPATIENT)
Dept: INTERNAL MEDICINE | Facility: CLINIC | Age: 67
End: 2017-06-30

## 2017-06-30 DIAGNOSIS — F41.1 ANXIETY STATE: ICD-10-CM

## 2017-06-30 RX ORDER — TRAZODONE HYDROCHLORIDE 100 MG/1
150 TABLET ORAL AT BEDTIME
Qty: 90 TABLET | Refills: 3
Start: 2017-06-30 | End: 2018-02-08

## 2017-06-30 NOTE — TELEPHONE ENCOUNTER
Sonya called - requesting increase in her Trazodone dose due to not sleeping at all since the death of her partner.  Currently is taking 100mg qhs.    Dr. Casey - could you increase to 150mg?  I am out of the office and unable to get an order to the assisted living home by fax.    Thank you,  Elizabeth Rose, Pharm.D.,[facility]  Board Certified Geriatric Pharmacist  Medication Therapy Management Pharmacist  [phone]

## 2017-06-30 NOTE — TELEPHONE ENCOUNTER
Pt states that [facility] is still waiting for the form to be signed by PCP and then faxed in so she can get the hospital bed.     TE from 6/20 below:

## 2017-07-03 DIAGNOSIS — I25.2 OLD MYOCARDIAL INFARCTION: ICD-10-CM

## 2017-07-05 NOTE — TELEPHONE ENCOUNTER
metoprolol (TOPROL-XL) 25 MG 24 hr tablet      Last Written Prescription Date: 5/18/17  Last Fill Quantity: 30, # refills: 0    Last Office Visit with [facility], [facility] or [facility] prescribing provider:  6/15/17   Future Office Visit:        BP Readings from Last 3 Encounters:   06/15/17 131/81   06/02/17 147/75   05/19/17 174/73

## 2017-07-06 RX ORDER — METOPROLOL SUCCINATE 25 MG/1
TABLET, EXTENDED RELEASE ORAL
Qty: 30 TABLET | Refills: 10 | Status: ON HOLD | OUTPATIENT
Start: 2017-07-06 | End: 2018-05-03

## 2017-07-11 ENCOUNTER — CARE COORDINATION (OUTPATIENT)
Dept: GERIATRIC MEDICINE | Facility: CLINIC | Age: 67
End: 2017-07-11

## 2017-07-11 ENCOUNTER — TELEPHONE (OUTPATIENT)
Dept: INTERNAL MEDICINE | Facility: CLINIC | Age: 67
End: 2017-07-11

## 2017-07-11 DIAGNOSIS — Z76.89 HEALTH CARE HOME: Chronic | ICD-10-CM

## 2017-07-11 NOTE — LETTER
[facility]  [address] [address]  Tel. [phone]      July 20, 2017      Sonya Foote  [address]   [place] [address]          Dear Sonya Foote    We have been trying to reach you. Please call us back at [phone] and ask to speak with a nurse.     Thank you!      Sincerely,    Dr. Casey's office

## 2017-07-11 NOTE — LETTER
[facility]  [address]  [address]  Phone  [phone]        Hello!    We have been trying to reach you. Please call us back at [phone] and ask to speak with a nurse. Thank you!    JASON Bolden RN

## 2017-07-13 RX ORDER — HYDROMORPHONE HYDROCHLORIDE 2 MG/1
2 TABLET ORAL
Qty: 30 TABLET | Refills: 0 | Status: CANCELLED | OUTPATIENT
Start: 2017-07-13

## 2017-07-13 NOTE — TELEPHONE ENCOUNTER
HYDROmorphone (DILAUDID) 2 MG tablet      Last Written Prescription Date:  11/28/2016  Last Fill Quantity: 30,   # refills: 0  Last Office Visit with [facility], [facility] or  Health prescribing provider: 6/15/2017  Future Office visit:    Next 5 appointments (look out 90 days)     Sep 27, 2017 11:15 AM CDT   Return Visit with Bj Anderson MD   [facility] ([facility])    [address] [place] [id number] [phone]                   Routing refill request to provider for review/approval because:  Drug not on the [facility], [facility] or  [facility] refill protocol or controlled substance

## 2017-07-13 NOTE — PROGRESS NOTES
7/12/17: CM received call from member stating that the incontinence briefs and pull ups she receives from [employer] are too thin - she states that she has spoken with them and it is the highest absorbency.  She states that she has been trying some Alicia brand that her spouse was using and she likes better - she is hoping to switch to this provider - [facility] -  is Kiley MÉNDEZ At [phone].  CM placed call to Kiley - she states that the Alicia pull ups (size med) are meant for member - they have been ordered by Maria Eugenia Denise at [facility].  They will continue with 120/mo of Alicia pull ups (size med) but will change to highest absorbency.   Also added to order will be Tabbed Briefs (size L).  Kiley states per MA can only be 150/mo total.  Member is aware and will try this amount  - if more is needed will discuss options at that time.   Member is hoping with the higher absorbency she will not need more.    CM canceled incont products through [employer] - member will still continue to get Glucerna through [employer].    CPS updated.     Jamie Sharma, RN, PHN  [facility]

## 2017-07-13 NOTE — PROGRESS NOTES
7/11/17: CM received call from member - she states that she received hospital bed and is getting used to it.  She is still waiting for cushion for chair - they were waiting on documentation from PCP but states it is being handled.   Member states she is anxious as this Friday they are celebrating the life with family of her spouse.  She states she really misses her and is trying to get out and do things and not seclude herself to her apartment.   Reminded member of [facility] worker appt on 7/13 at 1.   Member also states she is getting things worked out financially with spouse's finances and AL. She will let CM know if she has any questions.     Jamie Sharma RN, N  [facility]

## 2017-07-13 NOTE — TELEPHONE ENCOUNTER
Left message for pt call back and let us know if she wanted to  script in the clinic or mail to pharmacy listed.

## 2017-07-18 ENCOUNTER — CARE COORDINATION (OUTPATIENT)
Dept: GERIATRIC MEDICINE | Facility: CLINIC | Age: 67
End: 2017-07-18

## 2017-07-18 DIAGNOSIS — Z76.89 HEALTH CARE HOME: Chronic | ICD-10-CM

## 2017-07-18 NOTE — PROGRESS NOTES
7/18/17: Elvira received email from ULISES Shine with [facility] regarding roho cushion  - stating that she has been working with [facility] since June and no success in getting roho cushion - member skin is wearing thin and red.  Fátima is asking for CM assistance.  Fátima states she has been dealing with Nghia at [facility] in rehab.    ELVIRA placed call to [facility] -spoke with Nghia.   Nghia states documentation talks about preventing ulcers but no documentation of pressure ulcers present.  She has spoke with PCP office and they agree no documentation of pressure ulcers present.  Both ELVIRA and Nghia reviewed office notes together and she states she understands that it is prevention of pressure but the documentation talks more about hospital bed than for the roho cushion. She states double amputation does not just only qualify as well b/c member can use arms to shift/change positions.   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  She will review with another peer and get back to CM.  Nghia also shared that process takes long as [employer] requires Medicare denial first prior to MA review.    CM left ULISES Shine a vm relaying information to her.     CM received a call back from Nghia at [facility] stating that she reviewed and the OA of hands is not enough support.  Discussed MA and EW  - Nghia states they will send out roho cushion to member prior to getting ok from [employer] if CM will support under EW if not covered under MA.   ELVIRA agreed.  Nghia states roho cushion will be sent out this week - she will inform Fátima as well as member.       ELVIRA also relayed information to member.   She will let CM know once roho is received.       Member also states she had a good talk with [facility] worker last Thurs - she has another visit this Fri.   Member states memorial service for spouse this past Fri went well.      Jamie Sharma RN, N  [facility]

## 2017-07-19 ENCOUNTER — OFFICE VISIT (OUTPATIENT)
Dept: UROLOGY | Facility: CLINIC | Age: 67
End: 2017-07-19
Payer: COMMERCIAL

## 2017-07-19 VITALS
SYSTOLIC BLOOD PRESSURE: 152 MMHG | DIASTOLIC BLOOD PRESSURE: 74 MMHG | BODY MASS INDEX: 20.83 KG/M2 | WEIGHT: 133 LBS | OXYGEN SATURATION: 98 % | HEART RATE: 87 BPM

## 2017-07-19 DIAGNOSIS — R39.81 FUNCTIONAL INCONTINENCE: ICD-10-CM

## 2017-07-19 DIAGNOSIS — Z87.440 PERSONAL HISTORY OF URINARY TRACT INFECTION: Primary | ICD-10-CM

## 2017-07-19 PROCEDURE — 99213 OFFICE O/P EST LOW 20 MIN: CPT | Performed by: UROLOGY

## 2017-07-19 RX ORDER — ESTRADIOL 0.1 MG/G
CREAM VAGINAL
Qty: 42.5 G | Refills: 3 | Status: ON HOLD | OUTPATIENT
Start: 2017-07-19 | End: 2017-10-09

## 2017-07-19 ASSESSMENT — PAIN SCALES - GENERAL: PAINLEVEL: NO PAIN (0)

## 2017-07-19 NOTE — PATIENT INSTRUCTIONS
Please use the vaginal estrogen cream as directed (3x a week at night)    Supplements we discussed for prevention of UTI are vitamin C, d-mannose, probiotics, cranberry    Ellura: www.myellura.com, coupon code cfmd    Theracran HP by [employer]  [facility] [id number]    Please let us know if you are having any more urine infections    Please return to see us in 3 months, sooner if needed

## 2017-07-19 NOTE — MR AVS SNAPSHOT
After Visit Summary   7/19/2017    Sonya Foote    MRN: [id number]           Patient Information     Date Of Birth          1950        Visit Information        Provider Department      7/19/2017 1:45 PM Elizabeth Oliver MD [facility]        Today's Diagnoses     Personal history of urinary tract infection    -  1      Care Instructions    Please use the vaginal estrogen cream as directed (3x a week at night)    Supplements we discussed for prevention of UTI are vitamin C, d-mannose, probiotics, cranberry    Ellura: www.myellura.com, coupon code cfmd    Theracran HP by [employer]  [facility] [id number]    Please let us know if you are having any more urine infections    Please return to see us in 3 months, sooner if needed          Follow-ups after your visit        Follow-up notes from your care team     Return in about 3 months (around 10/19/2017).      Your next 10 appointments already scheduled     Jul 19, 2017  1:45 PM CDT   Return Visit with Elizabeth Oliver MD   [facility] ([facility])    [address]  [address]  [facility] [address]   [phone]            Jul 24, 2017 12:00 PM CDT   New Visit with Frances Martinez, PhD   [facility] ([facility])    [address] [place] [address] [phone]            Jul 25, 2017  2:00 PM CDT   Return Sleep Patient with Guanaco Kowalski MD   [facility] ([facility])    [address] [address] [id number] [phone]            Aug 03, 2017  8:00 AM CDT   VISUAL FIELD with [facility] EYE VISUAL FIELD   Eye Clinic ([facility])    [address]  [address] [facility]  [address] [address]   [phone]            Aug 03, 2017  8:30 AM CDT   RETURN GLAUCOMA with Marely Robin,  MD   Eye Clinic ([facility])    [address]  [address]  9th [address]  [facility] [address]   [phone]            Aug 28, 2017  1:30 PM CDT   (Arrive by 1:15 PM)   Return Visit with Alina Jennings MD   [facility] ([facility] [place])    [address]  3rd Floor  [facility] [address]   [phone]            Sep 08, 2017 10:00 AM CDT   (Arrive by 9:45 AM)   New Patient Visit with VICTOR M Floyd CNP   [facility] ([facility])    [address]  4th Floor  [facility] [address]   [phone]            Sep 27, 2017 11:15 AM CDT   Return Visit with Bj Anderson MD   [facility] [place] ([facility])    [address]  [facility] [address]   [phone]            Oct 18, 2017 12:00 PM CDT   Return Visit with Elizabeth Oliver MD   [facility] ([facility])    [address]  [address]  [facility] [address] [phone]            Nov 10, 2017  9:20 AM CST   (Arrive by 9:05 AM)   RETURN DIABETES with Michelle Irizarry MD   [facility] ([facility] [place])    [address]  3rd Floor  [facility] [address] [phone]              Who to contact     If you have questions or need follow up information about today's clinic visit or your schedule please contact [facility] [place] directly at [phone].  Normal or non-critical lab and imaging results will be communicated to you by MyChart, letter or phone within 4 business days after the clinic has received the results. If you do not hear from us within 7 days, please contact the clinic through MyChart or phone. If you have a critical or abnormal lab result, we will notify you by phone as soon as  "possible.  Submit refill requests through Pumodo or call your pharmacy and they will forward the refill request to us. Please allow 3 business days for your refill to be completed.          Additional Information About Your Visit        Pumodo Information     Pumodo lets you send messages to your doctor, view your test results, renew your prescriptions, schedule appointments and more. To sign up, go to [email or web address] . Click on \"Log in\" on the left side of the screen, which will take you to the Welcome page. Then click on \"Sign up Now\" on the right side of the page.     You will be asked to enter the access code listed below, as well as some personal information. Please follow the directions to create your username and password.     Your access code is: [email or web address]  Expires: 10/17/2017  1:21 PM     Your access code will  in 90 days. If you need help or a new code, please call your [place] clinic or [phone].        Care EveryWhere ID     This is your Care EveryWhere ID. This could be used by other organizations to access your [place] medical records  [id number]        Your Vitals Were     Pulse Pulse Oximetry BMI (Body Mass Index)             87 98% 20.83 kg/m2          Blood Pressure from Last 3 Encounters:   17 152/74   06/15/17 131/81   17 147/75    Weight from Last 3 Encounters:   17 60.3 kg (133 lb)   17 61.8 kg (136 lb 3.9 oz)   17 60.3 kg (133 lb)              Today, you had the following     No orders found for display         Today's Medication Changes          These changes are accurate as of: 17  1:21 PM.  If you have any questions, ask your nurse or doctor.               Start taking these medicines.        Dose/Directions    estradiol 0.1 MG/GM cream   Commonly known as:  ESTRACE VAGINAL   Used for:  Personal history of urinary tract infection   Started by:  Elizabeth Oliver MD        Use dime size amount 3x a week at night   Quantity:  " 42.5 g   Refills:  3         These medicines have changed or have updated prescriptions.        Dose/Directions    aspirin 81 MG EC tablet   This may have changed:  when to take this   Used for:  Unstable angina (H)        Dose:  81 mg   Take 1 tablet (81 mg) by mouth daily   Quantity:  90 tablet   Refills:  3       atorvastatin 40 MG tablet   Commonly known as:  LIPITOR   This may have changed:  when to take this   Used for:  Hyperlipidemia LDL goal <100        Dose:  40 mg   Take 1 tablet (40 mg) by mouth daily   Quantity:  90 tablet   Refills:  3       hydrochlorothiazide 12.5 MG capsule   Commonly known as:  MICROZIDE   This may have changed:  additional instructions   Used for:  Essential hypertension with goal blood pressure less than 130/80        Dose:  12.5 mg   Take 1 capsule (12.5 mg) by mouth daily   Quantity:  90 capsule   Refills:  3       ondansetron 4 MG ODT tab   Commonly known as:  ZOFRAN-ODT   This may have changed:    - when to take this  - reasons to take this   Used for:  Intractable vomiting with nausea, unspecified vomiting type        Dose:  4 mg   Take 1 tablet (4 mg) by mouth every 6 hours   Quantity:  120 tablet   Refills:  0       Phenytoin Sodium Extended 200 MG Caps   This may have changed:  additional instructions   Used for:  Seizure disorder (H)        Dose:  200 mg   Take 200 mg by mouth 2 times daily   Quantity:  60 capsule   Refills:  0            Where to get your medicines      These medications were sent to [facility], [address], [facility] [address]     Phone:  [phone]     estradiol 0.1 MG/GM cream                Primary Care Provider Office Phone # Fax #    Allan Casey [phone]       [facility] [address] [facility] [address]        Equal Access to Services     KARI CARREON AH: reji Millard qaybta kaalmada  tonie encarnacionlilia babin'aan ah. So [facility] [phone].    ATENCIÓN: Si radhala agatha, tiene a briones disposición servicios gratuitos de asistencia lingüística. Meño al [phone].    We comply with applicable federal civil rights laws and [place] laws. We do not discriminate on the basis of race, color, national origin, age, disability sex, sexual orientation or gender identity.            Thank you!     Thank you for choosing [facility] [place]  for your care. Our goal is always to provide you with excellent care. Hearing back from our patients is one way we can continue to improve our services. Please take a few minutes to complete the written survey that you may receive in the mail after your visit with us. Thank you!             Your Updated Medication List - Protect others around you: Learn how to safely use, store and throw away your medicines at www.disposemymeds.org.          This list is accurate as of: 7/19/17  1:21 PM.  Always use your most recent med list.                   Brand Name Dispense Instructions for use Diagnosis    ACETAMINOPHEN PO      Take 1,000 mg by mouth every 6 hours as needed for fever Taking q4-6 hours, per MAR        ADVAIR DISKUS 250-50 MCG/DOSE diskus inhaler   Generic drug:  fluticasone-salmeterol      Inhale 1 puff into the lungs 2 times daily        albuterol (2.5 MG/3ML) 0.083% neb solution     360 mL    INHALE 1 VIAL VIA NEBULIZER EVERY 6 HOURS AS NEEDED    Chronic obstructive pulmonary disease, unspecified COPD type (H)       aspirin 81 MG EC tablet     90 tablet    Take 1 tablet (81 mg) by mouth daily    Unstable angina (H)       atorvastatin 40 MG tablet    LIPITOR    90 tablet    Take 1 tablet (40 mg) by mouth daily    Hyperlipidemia LDL goal <100       blood glucose monitoring meter device kit     1 kit    Use to test blood sugars 3 times daily or as directed.    Type 2 diabetes, HbA1C goal < 8% (H)       blood  glucose monitoring test strip    ONE TOUCH ULTRA    3 Box    Use to test blood sugars 3 times daily or as directed.    Type 2 diabetes mellitus with diabetic peripheral angiopathy without gangrene, without long-term current use of insulin (H)       calcium citrate-vitamin D 315-250 MG-UNIT Tabs per tablet    CITRACAL    120 tablet    Take 2 tablets by mouth daily    Osteoporosis       cetirizine 10 MG tablet    zyrTEC    90 tablet    Take 1 tablet (10 mg) by mouth daily as needed for allergies    Seasonal allergic rhinitis, unspecified allergic rhinitis trigger       clopidogrel 75 MG tablet    PLAVIX    90 tablet    Take 1 tablet (75 mg) by mouth daily    PAD (peripheral artery disease) (H), UGI bleed, Osteoporosis, Nausea       CYANOCOBALAMIN PO      Take 2,000 mcg by mouth daily        cyclobenzaprine 10 MG tablet    FLEXERIL    30 tablet    Take 0.5-1 tablets (5-10 mg) by mouth 3 times daily as needed for muscle spasms    Cervicalgia       diclofenac 1 % Gel topical gel    VOLTAREN    100 g    Apply 2 g topically 4 times daily to hands    Primary osteoarthritis of both hands       dorzolamide 2 % ophthalmic solution    TRUSOPT     Place 1 drop into both eyes 2 times daily        * EASY TOUCH LANCETS 30G/TWIST Misc           * thin lancets    NO BRAND SPECIFIED    1 Box    Use to test blood sugar 3 times daily or as directed.    Type 2 diabetes, HbA1C goal < 8% (H)       * blood glucose monitoring lancets     3 Box    Use to test blood sugar 3 times daily or as directed.    Type 2 diabetes, HbA1C goal < 8% (H)       EPINEPHrine 0.15 MG/0.3ML injection 2-pack    EPIPEN JR    0.6 mL    Inject 0.3 mLs (0.15 mg) into the muscle as needed for anaphylaxis    Bee sting reaction, undetermined intent, subsequent encounter       ESCITALOPRAM OXALATE PO      Take 10 mg by mouth daily        estradiol 0.1 MG/GM cream    ESTRACE VAGINAL    42.5 g    Use dime size amount 3x a week at night    Personal history of urinary tract  infection       estradiol 1 MG tablet    ESTRACE    90 tablet    Take 1 tablet (1 mg) by mouth daily    Person who has had sex change operation       ferrous sulfate 325 (65 FE) MG tablet    IRON    60 tablet    Take 1 tablet (325 mg) by mouth 2 times daily With meals        fluticasone 50 MCG/ACT spray    FLONASE     Spray 1 spray into both nostrils daily        hydrochlorothiazide 12.5 MG capsule    MICROZIDE    90 capsule    Take 1 capsule (12.5 mg) by mouth daily    Essential hypertension with goal blood pressure less than 130/80       HYDROmorphone 2 MG tablet    DILAUDID    30 tablet    Take 1 tablet (2 mg) by mouth every 3 hours as needed for moderate to severe pain        RENÉE Pack      Take 1 packet by mouth 2 times daily        latanoprost 0.005 % ophthalmic solution    XALATAN    2.5 mL    Place 1 drop into both eyes At Bedtime    Primary open angle glaucoma, stage unspecified       levETIRAcetam 500 MG tablet    KEPPRA    180 tablet    Take 1 tablet (500 mg) by mouth 2 times daily    Nausea       lidocaine 5 % Patch    LIDODERM    30 patch    APPLY 1 TO 3 PATCHES TO PAINFUL AREA AT ONCE FOR UP TO 12 HOURS WITHIN A 24 HOUR PERIOD REMOVE AFTER 12 HOURS    Chronic pain syndrome, 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 (H)       lisinopril 10 MG tablet    PRINIVIL/ZESTRIL    90 tablet    Take 1 tablet (10 mg) by mouth daily    Essential hypertension with goal blood pressure less than 130/80       MEDICATION GIVEN BY INTRATHECAL PUMP - INSTRUCTION      continuous May 19, 2017 - per [facility] in [place] [phone]: Conc: Bupivacaine 20 mg/mL and Fentanyl 2000 mcg/mL. Continuous: Bupivacaine 7.265 mg/day and Fentanyl 726.5 mcg/day. Boluses: Up to 7 boluses per 24-hr period of Bupivicaine 0.599 mg and Fentanyl 59.9 mcg  Pump Refill Date at [facility]: 7/2/17        metoprolol 25 MG 24 hr tablet    TOPROL-XL    30 tablet    TAKE 1 TABLET (25 MG) BY MOUTH  DAILY    Old myocardial infarction       MULTIVITAMIN PO      Take 1 tablet by mouth daily        nitroGLYcerin 0.4 MG sublingual tablet    NITROSTAT    25 tablet    Place 1 tablet (0.4 mg) under the tongue every 5 minutes as needed for chest pain if you are still having symptoms after 3 doses (15 minutes) call 911.    Chronic systolic congestive heart failure (H), Old myocardial infarction       ondansetron 4 MG ODT tab    ZOFRAN-ODT    120 tablet    Take 1 tablet (4 mg) by mouth every 6 hours    Intractable vomiting with nausea, unspecified vomiting type       * order for DME     1 Month    Equipment being ordered: Depends briefs    Incontinence       * order for DME     1 Device    Equipment being ordered: Power Wheelchair    CVA (cerebral infarction), HTN (hypertension)       * order for DME     1 Units    Equipment being ordered: Nebulizer and supplies    Obstructive chronic bronchitis with exacerbation (H)       * order for DME     1 Box    Equipment being ordered: Glucerna daily shakes with each meal    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)       * order for DME     1 Device    ACcu check BID    Type 2 diabetes mellitus with diabetic peripheral angiopathy without gangrene, without long-term current use of insulin (H)       * order for DME     1 Units    Equipment being ordered: Nebulizer and tubing supplies    Simple chronic bronchitis (H)       * order for DME     1 Device    Equipment being ordered: CPAP supplies mask, hose, filters, cushion fax to [facility] at [phone] Disp: 10 DeviceRefills: prn Class: Local PrintStart: 2/10/2017    Chronic obstructive pulmonary disease, unspecified COPD type (H)       * order for DME     10 Device    Equipment being ordered: CPAP supplies mask, hose, filters, cushion  fax to [facility] at [phone]    COPD (chronic obstructive pulmonary disease) (H)       * order for DME     1 Device    Equipment being ordered:  "wheelchair seat cushion    Critical lower limb ischemia       * order for DME     1 Device    roho w/c cushion 18\" X 18\" for pressure relief    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 (H)       * order for DME     1 Device    Hospital bed with side rails    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 (H)       * order for DME     1 Device    Full electric hospital bed with half rails  Dx: F42675, I110, J449 Length of need: lifetime    Status post bilateral above knee amputation (H)       * order for DME     1 Device    Wheel Chair Cushion: 18 x 18 inch Roho cushion    Status post bilateral above knee amputation (H)       pantoprazole 40 MG EC tablet    PROTONIX     Take 40 mg by mouth daily        Phenytoin Sodium Extended 200 MG Caps     60 capsule    Take 200 mg by mouth 2 times daily    Seizure disorder (H)       polyethylene glycol Packet    MIRALAX/GLYCOLAX     Take 17 g by mouth daily Dissolved in water or juice        pregabalin 75 MG capsule    LYRICA    120 capsule    Take 2 capsules (150 mg) by mouth 2 times daily    Pain in both upper extremities       prochlorperazine 10 MG tablet    COMPAZINE    20 tablet    Take 1 tablet (10 mg) by mouth every 8 hours as needed    Nausea with vomiting       risperiDONE 0.5 MG tablet    risperDAL     Take 0.5 mg by mouth At Bedtime        sucralfate 1 GM tablet    CARAFATE    40 tablet    Take 1 tablet (1 g) by mouth 4 times daily May dissolve in 10 mL water is necessary. (Start upon completion of carafate suspension)    Gastroesophageal reflux disease without esophagitis       traZODone 100 MG tablet    DESYREL    90 tablet    Take 1.5 tablets (150 mg) by mouth At Bedtime    Anxiety state       umeclidinium 62.5 MCG/INH oral inhaler    INCRUSE ELLIPTA    3 Inhaler    Inhale 1 puff into the lungs daily    Chronic obstructive pulmonary disease, unspecified COPD type (H)       vitamin D 2000 UNITS tablet     100 tablet    Take 2,000 Units " by mouth daily.        zinc 50 MG Tabs      Take 1 tablet by mouth daily        * Notice:  This list has 16 medication(s) that are the same as other medications prescribed for you. Read the directions carefully, and ask your doctor or other care provider to review them with you.

## 2017-07-19 NOTE — PROGRESS NOTES
July 19, 2017    Return visit    Patient returns today for follow up after her recent hospitalization for pyelonephritis.  She is also currently grieving as she lost her life partner to liver disease recently.  She does have family support and is currently in grief counseling.  She is seeing her cardiologist next week about her anticoagulation.  She wants to discuss any options to prevent UTI.  She denies any other changes in her health since last visit.    /74 (BP Location: Left arm, Patient Position: Sitting, Cuff Size: Adult Regular)  Pulse 87  Wt 60.3 kg (133 lb)  SpO2 98%  BMI 20.83 kg/m2  She is comfortable, in no distress, non-labored breathing.      A/P: 67 year old F  with extensive vascular disease s/p bilateral AKA and Kari who desires a suprapubic catheter     Discussed the fact that a urethral harrell would be a bad idea because of the risk of urethral erosion that would predispose her to more urinary incontinence after SPT placed    Again discussed cannot do the SPT until she can come off of her anticoagulation    For UTI prevention discussed options of prophylactic abx, vaginal estrogen cream or supplements such as vitamin C, cranberry, probiotics, d-mannose.  Patient wishes to try vaginal estrogen cream    RTC 3 months as at that point she should be able to come off of her anticoagulation and we can discuss SPT    15 minutes were spent with the patient today, > 50% in counseling and coordination of care    Elizabeth Oliver MD MPH   of Urology    CC  Patient Care Team:  Allan Casey MD as PCP - General (Internal Medicine)  Lexii Cardenas, RN as Registered Nurse (Diabetic Education)  Shavon Elam RD as Registered Dietician (Nutrition)  Jamie Sharma, RN as   Ernestine Weldon as Other (see comments)  Michelle Irizarry MD as MD (Internal Medicine)  Savannah Durant MD as MD (Vascular Surgery)  Allan Casey MD as MD (Internal Medicine)  Azael Arriaga  MD Zi as Resident (Ophthalmology)  Dayna Ellis NP as Nurse Practitioner (Nurse Practitioner)  Self, Referred, MD as Referring Physician  Janusz Rm MD as MD (Ophthalmology)  Alina Jennings MD as MD (Pulmonary)  Mello Arroyo MD as MD (Family Medicine - Sports Medicine)  Mary Elliott OT as Occupational Therapist (Occupational Therapy)  Tonie Syed, RN as Nurse Coordinator (Vascular Surgery)  Salome Neri Cynthia See Ming, MD as MD (Urology)  Yi Mathews, RN as Registered Nurse (Urology)

## 2017-07-19 NOTE — PROGRESS NOTES
7/19/17: Received call from Nghia with [employer] - she is finishing processing roho cushion and stated that should be delivered tomorrow.  She has spoken with Fátima and states that Fátima is planning to see member once delivered so she will notify Fátima once delivered.     Jamie Sharma RN, N  [employer]

## 2017-07-19 NOTE — LETTER
7/19/2017       RE: Sonya Foote  [address]   [place] [address]     Dear Colleague,    Thank you for referring your patient, Sonya Foote, to the [facility] [place] at [facility]. Please see a copy of my visit note below.    July 19, 2017    Return visit    Patient returns today for follow up after her recent hospitalization for pyelonephritis.  She is also currently grieving as she lost her life partner to liver disease recently.  She does have family support and is currently in grief counseling.  She is seeing her cardiologist next week about her anticoagulation.  She wants to discuss any options to prevent UTI.  She denies any other changes in her health since last visit.    /74 (BP Location: Left arm, Patient Position: Sitting, Cuff Size: Adult Regular)  Pulse 87  Wt 60.3 kg (133 lb)  SpO2 98%  BMI 20.83 kg/m2  She is comfortable, in no distress, non-labored breathing.      A/P: 67 year old F  with extensive vascular disease s/p bilateral AKA and Kari who desires a suprapubic catheter     Discussed the fact that a urethral harrell would be a bad idea because of the risk of urethral erosion that would predispose her to more urinary incontinence after SPT placed    Again discussed cannot do the SPT until she can come off of her anticoagulation    For UTI prevention discussed options of prophylactic abx, vaginal estrogen cream or supplements such as vitamin C, cranberry, probiotics, d-mannose.  Patient wishes to try vaginal estrogen cream    RTC 3 months as at that point she should be able to come off of her anticoagulation and we can discuss SPT    15 minutes were spent with the patient today, > 50% in counseling and coordination of care    Elizabeth Oliver MD MPH   of Urology    CC  Patient Care Team:  Allan Casey MD as PCP - General (Internal Medicine)  Lexii Cardenas, RN as Registered Nurse  (Diabetic Education)  Shavon Elam, SANGITA as Registered Dietician (Nutrition)  Jamie Sharma, RN as   Ernestine Weldon as Other (see comments)  Michelle Irizarry MD as MD (Internal Medicine)  Savannah Durant MD as MD (Vascular Surgery)  Azael Arriaga MD as Resident (Ophthalmology)  Dayna Ellis NP as Nurse Practitioner (Nurse Practitioner)  Janusz Rm MD as MD (Ophthalmology)  Alina Jennings MD as MD (Pulmonary)  Mello Arroyo MD as MD (Family Medicine - Sports Medicine)  Mary Elliott OT as Occupational Therapist (Occupational Therapy)  Tonie Syed, RN as Nurse Coordinator (Vascular Surgery)  Salome Neri Maxine, RN as Registered Nurse (Urology)

## 2017-07-20 ENCOUNTER — TRANSFERRED RECORDS (OUTPATIENT)
Dept: HEALTH INFORMATION MANAGEMENT | Facility: CLINIC | Age: 67
End: 2017-07-20

## 2017-07-20 ENCOUNTER — TELEPHONE (OUTPATIENT)
Dept: INTERNAL MEDICINE | Facility: CLINIC | Age: 67
End: 2017-07-20

## 2017-07-20 DIAGNOSIS — N39.0 URINARY TRACT INFECTION WITHOUT HEMATURIA, SITE UNSPECIFIED: Primary | ICD-10-CM

## 2017-07-20 NOTE — TELEPHONE ENCOUNTER
Nurse from [facility] calling and states that pt is c/o urinary frequency and strong order.  Asking for UA/UC.  Okay given.  They will fax results once available.  Routed to station to watch for results.

## 2017-07-24 RX ORDER — LEVOFLOXACIN 500 MG/1
500 TABLET, FILM COATED ORAL DAILY
Qty: 7 TABLET | Refills: 0 | Status: SHIPPED | OUTPATIENT
Start: 2017-07-24 | End: 2017-08-24

## 2017-07-24 NOTE — TELEPHONE ENCOUNTER
[facility] informed of order:     levofloxacin (LEVAQUIN) 500 MG tablet 7 tablet 0 7/24/2017  --      Sig: Take 1 tablet (500 mg) by mouth daily     Requesting order faxed to [phone]

## 2017-07-25 ENCOUNTER — CARE COORDINATION (OUTPATIENT)
Dept: GERIATRIC MEDICINE | Facility: CLINIC | Age: 67
End: 2017-07-25

## 2017-07-25 ENCOUNTER — OFFICE VISIT (OUTPATIENT)
Dept: SLEEP MEDICINE | Facility: CLINIC | Age: 67
End: 2017-07-25
Payer: COMMERCIAL

## 2017-07-25 VITALS
WEIGHT: 133 LBS | DIASTOLIC BLOOD PRESSURE: 81 MMHG | OXYGEN SATURATION: 98 % | SYSTOLIC BLOOD PRESSURE: 143 MMHG | HEART RATE: 84 BPM | BODY MASS INDEX: 20.83 KG/M2

## 2017-07-25 DIAGNOSIS — G47.39 COMPLEX SLEEP APNEA SYNDROME: ICD-10-CM

## 2017-07-25 DIAGNOSIS — I10 ESSENTIAL HYPERTENSION WITH GOAL BLOOD PRESSURE LESS THAN 130/80: Primary | ICD-10-CM

## 2017-07-25 DIAGNOSIS — Z76.89 HEALTH CARE HOME: Chronic | ICD-10-CM

## 2017-07-25 DIAGNOSIS — G47.33 OSA (OBSTRUCTIVE SLEEP APNEA): ICD-10-CM

## 2017-07-25 PROCEDURE — 99214 OFFICE O/P EST MOD 30 MIN: CPT | Performed by: INTERNAL MEDICINE

## 2017-07-25 NOTE — NURSING NOTE
"Chief Complaint   Patient presents with     CPAP Follow Up       Initial There were no vitals taken for this visit. Estimated body mass index is 20.83 kg/(m^2) as calculated from the following:    Height as of 5/31/17: 1.702 m (5' 7\").    Weight as of 7/19/17: 60.3 kg (133 lb).  Medication Reconciliation: complete    "

## 2017-07-25 NOTE — PROGRESS NOTES
Obstructive Sleep Apnea- PAP Follow-Up Visit:    Chief Complaint   Patient presents with     CPAP Follow Up       Sonya Foote comes in today for follow-up of their moderate sleep apnea, managed with CPAP.     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 Did have Restless Legs Syndrome before amputation but not anymore.     Sleep history:   10/26/2012 - Polysomnography at [facility] -  min; AHI 14; RDI 22; ERIN 5/hr; No PLMs or RBD.  2012 - Titration Polysomnography at [facility] - Started on CPAP and developed treatment-emergent central apneas. Adaptive Servo-Ventilation titration optimal but did well on CPAP 8 as well (at least REM supine per comments). Put on CPAP 8 cmH2O.   Reports on CPAP she is less sleepy and has energy during the day. Given mild disease, would only recommend treatment for symptom control.  Given EF <= 40% Adaptive Servo-Ventilation is now contraindicated in systolic CHF despite good control on testing.    2017 - Titration Polysomnography at the [facility] [place].  Sleep efficiency 92.2%. Total sleep time 508 minutes.  Full night titration study.  CPAP 5 - 12 cmH2O tried.  Unacceptable titration due to high residual AHI, however, oxygen desaturations were controlled on CPAP of 10 cmH2O or higher, and looked better on CPAP of 12 cmH2O.    Since last visit, significant other, Jayde, .      Overall, she rates the experience with PAP as 8 (0 poor, 10 great). The mask is comfortable.    The mask is not leaking .  She is not snoring with the mask on. She is not having gasp arousals.  She is not having significant oral/nasal dryness. The pressure is comfortable.     Bedtime is typically 2200. Usually it takes about 15 min minutes to fall  asleep with the mask on. Wake time is typically 0630.  Patient is using PAP therapy 5 hours per night. The patient is usually getting 8 hours of sleep per night.    She does feel rested in the morning.    Total score - [place]: 2 (7/25/2017  2:00 PM)    ResMed   CPAP 12 cmH2O download:  6 total days of use. 24 nonuse days. 3%  days with >4 hours use.  Average use 2 hours 51 minutes per day. Median Leak 0.0 L/min. 95%ile Leak 8.6 L/min. AHI 19.6.       Past medical/surgical history, family history, social history, medications and allergies were reviewed.      Problem List:  Patient Active Problem List    Diagnosis Date Noted     Functional incontinence 07/19/2017     Priority: Medium     Personal history of urinary tract infection 07/19/2017     Priority: Medium     Bee sting reaction, undetermined intent, subsequent encounter 04/06/2017     Priority: Medium     Coronary artery disease of native artery of native heart with stable angina pectoris (H) 04/04/2017     Priority: Medium     Hyperlipidemia LDL goal <70 04/04/2017     Priority: Medium     Ischemic cardiomyopathy 04/04/2017     Priority: Medium     Complex sleep apnea syndrome 03/15/2017     Priority: Medium     JOSE (obstructive sleep apnea) 02/22/2017     Priority: Medium     10/26/2012 - Polysomnography at [facility] -  min; AHI 14; RDI 22; ERIN 5/hr; No PLMs or RBD.  11/16/2012 - Titration Polysomnography at [facility] - Started on CPAP and developed treatment-emergent central apneas.  Adaptive Servo-Ventilation titration optimal but did well on CPAP 8 as well. Put on CPAP 8 cmH2O.    Has systolic CHF, intrathecal narcotic pump, and treatment-emergent Central Sleep Apnea on CPAP.  Titration Study:  Sleep Study 2/26/2017 - (130.0 lbs) CPAP was titrated to a pressure of 12 with an AHI of 38.5.  Time Spent in REM supine at this pressure was 7.0       Pyelonephritis 12/22/2016     Priority: Medium     Primary open angle glaucoma of both eyes, severe stage 12/08/2016      Priority: Medium     Pseudophakia of right eye 2016     Priority: Medium     Cataract, left eye 2016     Priority: Medium     Diabetes mellitus type 2 without retinopathy (H) 2016     Priority: Medium     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 (H) 2016     Priority: Medium     Critical lower limb ischemia 2016     Priority: Medium     Anemia, unspecified type 10/22/2016     Priority: Medium     Type 2 diabetes mellitus with diabetic peripheral angiopathy without gangrene, without long-term current use of insulin (H) 10/12/2016     Priority: Medium     Angina pectoris, crescendo (H) 2016     Priority: Medium     Cellulitis of right ankle 2016     Priority: Medium     Essential hypertension with goal blood pressure less than 130/80 2016     Priority: Medium     Atherosclerotic peripheral vascular disease with rest pain (H) 2016     Priority: Medium     Primary osteoarthritis of both hands 2016     Priority: Medium     Pain in both upper extremities 2016     Priority: Medium     Carotid stenosis, left 2016     Priority: Medium     Chronic systolic congestive heart failure (H) 2016     Priority: Medium     PAD (peripheral artery disease) (H) 2015     Priority: Medium     Health Care Home 2015     Priority: Medium     [facility]   Jamie Sharma RN  [phone]    [facility] CARE PLAN SUMMARY    Client Name:  Sonya Foote  Address:  [address] [place] [address]  [address] [place] [id number] Phone: [phone] (Cell)   :  1950 Date of Assessment:  16 (at TCU)  Visit at [facility] after discharge:  16   Health Plan:  [facility]  Health Plan Number: [id number] Medical Assistance Number: [id number]  Financial Worker:  [facility]  Case #:  [id number]   [facility] :  Jamie Sharma RN  Phone:  [phone]   Fax:  [phone]   [facility] Enrollment Date: 4/1/15 Case  "Mix:  E  Rate Cell:  B  Waiver Type:  EW   Emergency Contact:  Extended Emergency Contact Information  Primary Emergency Contact: PRASHANTH CARRANZA  Home Phone: [phone]  Mobile Phone: [phone]  Relation: Daughter Language:  English  :  No   Health Care Agent/POA:  Jayde Carranza (spouse).   1st Alternate: Prashanth Carranza (daughter)   2nd Alternate: Anahi Carranza (son) Advanced Directives/Living Will:  Yes   Primary Care Clinic/Phone/Fax:  [facility]/(p) [phone], (f) [phone] Primary Dx:  E11.51 Diabetes  Secondary Dx:  V49.76 Right AKA   Primary Physician:  Allan Casey   Height:  5' 7\"  Weight:  143 lbs   Specialty Physician:     MAPs - [place] Audiologist:  ROCÍO   Eye Care Provider:  [facility] Care Provider:    [facility] [phone]   Other:  ARHMS Worker - Cary Junior ([facility] Support [facility])  [phone] Special Transportation ([facility]) Provider: [employer] [phone]         [facility] CURRENT SERVICES SUMMARY  Equipment owned/DME history: power chair, manual w/c, standard walker, 4 wheeled walker w/ seat; cane  Transfer tub bench, safety pole w/ super bar; drop arm commode, transfer board, talking watch, reacher, hospital bed   SERVICE TYPE/PROVIDER NAME/PHONE AUTH DATE FREQUENCY Units OR $ Amt DESCRIPTION   Medical Transportation: [employer] [phone] 12/1/16 - 11/30/17 as needed N/A N/A   Supplies: [employer] [phone] 12/1/16 - 11/30/17 monthly N/A Glucerna 2 cans/day      Supplies: [employer] [phone] 12/1/16 -7/13/17  *Stopped 7/13/17 as using another Vendor monthly N/A Pull ups (size medium) 3-4 per day  Tabbed Briefs (size L)     *Stopped 7/13/17 as using another Vendor     Supplies: [facility] [phone]  Fax: [phone]  Contact: Kiley MÉNDEZ    7/13/17 - 11/30/17 monthly N/A Alicia Pull ups (Med)(higest absorb) 120/mo  Alicia tabbed briefs (Large) 30/mo  *ordered by Maria Eugenia at " [facility]       [employer] tickets through [employer] [facility] (EW) 12/1/16 - 11/30/17 monthly  10 rush  10 non rush     HHA/RN: [facility] [phone]   4/2017 RN - eval and treat      PT/OT - eval and   treat N/A RN visits weekly  PT visits     Assisted Living: Vanessa Delgado  [phone]   Fax: [phone] 24 hr Customized Living  (RN - [phone]) 12/19/16 - 12/13/17 daily See RS/AL Tool      Supplies: [employer]   [phone]  Fax: [phone]  (formerly [employer]) 12/19/16 1x order  Transfer Board (MA)    Drop Arm Commode ([place])     Supplies: [employer]   [phone]  Fax: [phone]  (formerly [employer]) 12/19/16 1x fee $65.00 1x fee Delivery charge of equip (EW)     DME: [employer] [facility] [phone]  Fax: [phone]  [employer] (previously [employer]) 1/19/17 1x order $26.90 CPAP power cord for Resmed S9 (EW)     DME: [employer] [phone]  Fax: [phone]  [employer] (previously [employer]) 2/8/17 1x order $96.00 CPAP S9 90w power supply unit (EW)     DME: [facility] [phone]   Fax: [phone]   5/9/17 1x order $234 Pocket Talker with Accessories (overhead head set, )   (EW)     DME: [facility] [phone]  Fax: [phone] 5/22/17 1x order $35 Toilet Safety Frame ([id number]) () (EW)     DME: [facility] [phone] 6/2017 1x order N/A Hospital Bed  (ordered by [facility] OT)     DME: [employer] [facility] [employer] [facility] [phone] 7/2017 1x order N/A Roho Cushion (orderd by [facility] OT)              Cervicalgia 01/15/2015     Priority: Medium     GIB (gastrointestinal bleeding) 08/21/2014     Priority: Medium     Intestinal malabsorption 08/06/2014     Priority: Medium     updating diagnosis code for icd10 cutover       Claudication in peripheral vascular disease (H) 04/22/2014     Priority: Medium     Person who has had sex change operation 01/20/2014     Priority: Medium     Vertigo 11/08/2013      Priority: Medium     AS (sickle cell trait) (H) 10/08/2013     Priority: Medium     Schizoaffective disorder (H) 06/07/2013     Priority: Medium     Osteoporosis 01/21/2013     Priority: Medium     Chronic obstructive pulmonary disease, unspecified COPD type (H) 01/01/2013     Priority: Medium     ACP (advance care planning) 09/13/2012     Priority: Medium     Advance Care Planning 7/19/2016: ACP Review of Chart / Resources Provided:  Reviewed chart for advance care plan.  Sonya Foote has an up to date advance care plan on file.  Added by Jamie Sharma  Advance Care Planning 5/5/2016: ACP Review of Chart / Resources Provided:  Reviewed chart for advance care plan.  Sonya Foote has an up to date advance care plan on file.  Added by Jamie Sharma    Patient states has Advance Directive and will bring in a copy to clinic. 9/13/2012          Hyperlipidemia LDL goal <100 09/04/2012     Priority: Medium     Seizure disorder (H) 09/04/2012     Priority: Medium     Adjustment disorder with depressed mood 09/16/2009     Priority: Medium     Central retinal artery occlusion 08/19/2009     Priority: Medium     Anxiety disorder due to general medical condition 07/29/2009     Priority: Medium     History of colonic polyps 07/13/2009     Priority: Medium     Overview:   Colonoscopy completed on July 2009.  See report for full detail.  Please re refer in 5 years for repeat colon cancer screening if medically appropriate.  Need colyte 4/2 preparation.       Hereditary and idiopathic peripheral neuropathy 07/10/2009     Priority: Medium     Peripheral nerve disease (H) 07/10/2009     Priority: Medium     Androgen insensitivity syndrome 03/23/2009     Priority: Medium     Testicular feminization 03/23/2009     Priority: Medium     Chronic pain syndrome 03/20/2009     Priority: Medium     Patient is followed by Allan Casey for ongoing prescription of pain meds  All refills should be approved by this provider, or covering  partner.    Maximum quantity per month: 1 month  Clinic visit frequency required: Q 6  months     Controlled substance agreement:  Encounter-Level CSA:     There are no encounter-level csa.        Pain Clinic evaluation in the past: No    DIRE Total Score(s):  No flowsheet data found.    Last [facility] website verification:  none   [email or web address]       Thoracic or lumbosacral neuritis or radiculitis 03/20/2009     Priority: Medium     Lumbosacral radiculitis 03/20/2009     Priority: Medium     Prediabetes 02/26/2008     Priority: Medium     Disorder of bone and cartilage 05/24/2007     Priority: Medium     Osteopenia 05/24/2007     Priority: Medium     Late effects of cerebrovascular disease 07/13/2006     Priority: Medium     Cerebral infarction due to occlusion or stenosis of carotid artery 02/06/2006     Priority: Medium     Problem list name updated by automated process. Provider to review       Iron deficiency anemia 11/18/2005     Priority: Medium     Degeneration of intervertebral disc of lumbosacral region 11/18/2005     Priority: Medium     Overview:   with radiculopathy       Old myocardial infarction 08/02/2005     Priority: Medium     Open-angle glaucoma 08/11/2003     Priority: Medium     Asthma 04/04/2003     Priority: Medium        /81  Pulse 84  Wt 60.3 kg (133 lb)  SpO2 98%  BMI 20.83 kg/m2    Impression/Plan:  1. Complex sleep apnea syndrome  2. JOSE (obstructive sleep apnea)  Mild-To-Moderate Sleep apnea with combination of Obstructive Sleep Apnea and narcotic-induced CSA. Not tolerating PAP well. Daytime symptoms are stable..       3. Essential hypertension with goal blood pressure less than 130/80  Patient to follow up with Primary Care provider regarding elevated blood pressure.      Sonya Foote will follow up in about 1 year(s).     Twenty-five minutes spent with patient, all of which were spent face-to-face counseling, consulting, coordinating plan of care.      CC:   Allan Casey,

## 2017-07-25 NOTE — MR AVS SNAPSHOT
After Visit Summary   7/25/2017    Sonya Foote    MRN: [id number]           Patient Information     Date Of Birth          1950        Visit Information        Provider Department      7/25/2017 2:00 PM Guanaco Kowalski MD [facility]        Today's Diagnoses     Essential hypertension with goal blood pressure less than 130/80    -  1    Complex sleep apnea syndrome        JOSE (obstructive sleep apnea)           Follow-ups after your visit        Follow-up notes from your care team     Return in 1 year (on 7/25/2018) for PAP follow up.      Your next 10 appointments already scheduled     Jul 26, 2017  9:30 AM CDT   [facility]   [facility]    [address] [address] [phone]           Patient must bring picture ID.  Patient should be prepared to give a urine specimen  Please do not eat 10-12 hours before your appointment if you are coming in fasting for labs on lipids, cholesterol, or glucose (sugar).  Pregnant women should follow their Care Team instructions. Water with medications is okay. Do not drink coffee or other fluids.   If you have concerns about taking  your medications, please ask at office or if scheduling via [employer], send a message by clicking on Secure Messaging, Message Your Care Team.            Aug 03, 2017  8:00 AM CDT   VISUAL FIELD with [facility] EYE VISUAL FIELD   Eye Clinic ([facility])    [address]  [address]  [facility] [address]   [phone]            Aug 03, 2017  8:30 AM CDT   RETURN GLAUCOMA with Marely Robin MD   Eye Clinic ([facility])    [address]  [address]  [facility] [address]   [phone]            Aug 28, 2017  1:30 PM CDT   (Arrive by 1:15 PM)   Return Visit with Alina Jennings MD   [facility] ([facility])    [address]  [address] [facility] [address]   [phone]            Sep 08, 2017 10:00 AM CDT   (Arrive by 9:45 AM)   New Patient Visit with VICTOR M Floyd CNP   [facility] ([facility])    [address]  4th Floor  [facility] [address]   [phone]            Sep 27, 2017 11:15 AM CDT   Return Visit with Bj Anderson MD   [facility] [place] ([facility])    [address]  [facility] [address]   [phone]            Oct 18, 2017 12:00 PM CDT   Return Visit with Elizabeth Oliver MD   [facility] ([facility])    [address]  [address]  [facility] [address]   [phone]            Nov 10, 2017  9:20 AM CST   (Arrive by 9:05 AM)   RETURN DIABETES with Michelle Irizarry MD   [facility] ([facility])    [address]  [address] [facility] [address]   [phone]              Who to contact     If you have questions or need follow up information about today's clinic visit or your schedule please contact [facility] directly at [phone].  Normal or non-critical lab and imaging results will be communicated to you by [employer], letter or phone within 4 business days after the clinic has received the results. If you do not hear from us within 7 days, please contact the clinic through [employer] or phone. If you have a critical or abnormal lab result, we will notify you by phone as soon as possible.  Submit refill requests through [employer] or call your pharmacy and they will forward the refill request to us. Please allow 3 business days for your refill to be completed.          Additional Information About Your Visit        MedPageToday Information     MedPageToday lets you send messages to your doctor, view  "your test results, renew your prescriptions, schedule appointments and more. To sign up, go to [email or web address] . Click on \"Log in\" on the left side of the screen, which will take you to the Welcome page. Then click on \"Sign up Now\" on the right side of the page.     You will be asked to enter the access code listed below, as well as some personal information. Please follow the directions to create your username and password.     Your access code is: [email or web address]  Expires: 10/17/2017  1:21 PM     Your access code will  in 90 days. If you need help or a new code, please call your [place] clinic or [phone].        Care EveryWhere ID     This is your Care EveryWhere ID. This could be used by other organizations to access your [place] medical records  [id number]        Your Vitals Were     Pulse Pulse Oximetry BMI (Body Mass Index)             84 98% 20.83 kg/m2          Blood Pressure from Last 3 Encounters:   17 143/81   17 152/74   06/15/17 131/81    Weight from Last 3 Encounters:   17 60.3 kg (133 lb)   17 60.3 kg (133 lb)   17 61.8 kg (136 lb 3.9 oz)              Today, you had the following     No orders found for display         Today's Medication Changes          These changes are accurate as of: 17  2:57 PM.  If you have any questions, ask your nurse or doctor.               These medicines have changed or have updated prescriptions.        Dose/Directions    aspirin 81 MG EC tablet   This may have changed:  when to take this   Used for:  Unstable angina (H)        Dose:  81 mg   Take 1 tablet (81 mg) by mouth daily   Quantity:  90 tablet   Refills:  3       atorvastatin 40 MG tablet   Commonly known as:  LIPITOR   This may have changed:  when to take this   Used for:  Hyperlipidemia LDL goal <100        Dose:  40 mg   Take 1 tablet (40 mg) by mouth daily   Quantity:  90 tablet   Refills:  3       hydrochlorothiazide 12.5 MG capsule   Commonly known as:  " MICROZIDE   This may have changed:  additional instructions   Used for:  Essential hypertension with goal blood pressure less than 130/80        Dose:  12.5 mg   Take 1 capsule (12.5 mg) by mouth daily   Quantity:  90 capsule   Refills:  3       ondansetron 4 MG ODT tab   Commonly known as:  ZOFRAN-ODT   This may have changed:    - when to take this  - reasons to take this   Used for:  Intractable vomiting with nausea, unspecified vomiting type        Dose:  4 mg   Take 1 tablet (4 mg) by mouth every 6 hours   Quantity:  120 tablet   Refills:  0       Phenytoin Sodium Extended 200 MG Caps   This may have changed:  additional instructions   Used for:  Seizure disorder (H)        Dose:  200 mg   Take 200 mg by mouth 2 times daily   Quantity:  60 capsule   Refills:  0                Primary Care Provider Office Phone # Fax #    Allan Casey [phone]       [facility] [address] [facility] [address]        Equal Access to Services     KARI CARREON : Hadii shaheen gastelum hadasho Sojoe, waaxda luqadaha, qaybta kaalmada adeegyada, tonie marvin . So [facility] [phone].    ATENCIÓN: Si habla español, tiene a briones disposición servicios gratuitos de asistencia lingüística. [facility] [phone].    We comply with applicable federal civil rights laws and [place] laws. We do not discriminate on the basis of race, color, national origin, age, disability sex, sexual orientation or gender identity.            Thank you!     Thank you for choosing [facility]  for your care. Our goal is always to provide you with excellent care. Hearing back from our patients is one way we can continue to improve our services. Please take a few minutes to complete the written survey that you may receive in the mail after your visit with us. Thank you!             Your Updated Medication List - Protect others around you: Learn how to safely use, store and throw away your  medicines at www.disposemymeds.org.          This list is accurate as of: 7/25/17  2:57 PM.  Always use your most recent med list.                   Brand Name Dispense Instructions for use Diagnosis    ACETAMINOPHEN PO      Take 1,000 mg by mouth every 6 hours as needed for fever Taking q4-6 hours, per MAR        ADVAIR DISKUS 250-50 MCG/DOSE diskus inhaler   Generic drug:  fluticasone-salmeterol      Inhale 1 puff into the lungs 2 times daily        albuterol (2.5 MG/3ML) 0.083% neb solution     360 mL    INHALE 1 VIAL VIA NEBULIZER EVERY 6 HOURS AS NEEDED    Chronic obstructive pulmonary disease, unspecified COPD type (H)       aspirin 81 MG EC tablet     90 tablet    Take 1 tablet (81 mg) by mouth daily    Unstable angina (H)       atorvastatin 40 MG tablet    LIPITOR    90 tablet    Take 1 tablet (40 mg) by mouth daily    Hyperlipidemia LDL goal <100       blood glucose monitoring meter device kit     1 kit    Use to test blood sugars 3 times daily or as directed.    Type 2 diabetes, HbA1C goal < 8% (H)       blood glucose monitoring test strip    ONE TOUCH ULTRA    3 Box    Use to test blood sugars 3 times daily or as directed.    Type 2 diabetes mellitus with diabetic peripheral angiopathy without gangrene, without long-term current use of insulin (H)       calcium citrate-vitamin D 315-250 MG-UNIT Tabs per tablet    CITRACAL    120 tablet    Take 2 tablets by mouth daily    Osteoporosis       cetirizine 10 MG tablet    zyrTEC    90 tablet    Take 1 tablet (10 mg) by mouth daily as needed for allergies    Seasonal allergic rhinitis, unspecified allergic rhinitis trigger       clopidogrel 75 MG tablet    PLAVIX    90 tablet    Take 1 tablet (75 mg) by mouth daily    PAD (peripheral artery disease) (H), UGI bleed, Osteoporosis, Nausea       CYANOCOBALAMIN PO      Take 2,000 mcg by mouth daily        cyclobenzaprine 10 MG tablet    FLEXERIL    30 tablet    Take 0.5-1 tablets (5-10 mg) by mouth 3 times daily as  needed for muscle spasms    Cervicalgia       diclofenac 1 % Gel topical gel    VOLTAREN    100 g    Apply 2 g topically 4 times daily to hands    Primary osteoarthritis of both hands       dorzolamide 2 % ophthalmic solution    TRUSOPT     Place 1 drop into both eyes 2 times daily        * EASY TOUCH LANCETS 30G/TWIST Misc           * thin lancets    NO BRAND SPECIFIED    1 Box    Use to test blood sugar 3 times daily or as directed.    Type 2 diabetes, HbA1C goal < 8% (H)       * blood glucose monitoring lancets     3 Box    Use to test blood sugar 3 times daily or as directed.    Type 2 diabetes, HbA1C goal < 8% (H)       EPINEPHrine 0.15 MG/0.3ML injection 2-pack    EPIPEN JR    0.6 mL    Inject 0.3 mLs (0.15 mg) into the muscle as needed for anaphylaxis    Bee sting reaction, undetermined intent, subsequent encounter       ESCITALOPRAM OXALATE PO      Take 10 mg by mouth daily        estradiol 0.1 MG/GM cream    ESTRACE VAGINAL    42.5 g    Use dime size amount 3x a week at night    Personal history of urinary tract infection       estradiol 1 MG tablet    ESTRACE    90 tablet    Take 1 tablet (1 mg) by mouth daily    Person who has had sex change operation       ferrous sulfate 325 (65 FE) MG tablet    IRON    60 tablet    Take 1 tablet (325 mg) by mouth 2 times daily With meals        fluticasone 50 MCG/ACT spray    FLONASE     Spray 1 spray into both nostrils daily        hydrochlorothiazide 12.5 MG capsule    MICROZIDE    90 capsule    Take 1 capsule (12.5 mg) by mouth daily    Essential hypertension with goal blood pressure less than 130/80       HYDROmorphone 2 MG tablet    DILAUDID    30 tablet    Take 1 tablet (2 mg) by mouth every 3 hours as needed for moderate to severe pain        RENÉE Pack      Take 1 packet by mouth 2 times daily        latanoprost 0.005 % ophthalmic solution    XALATAN    2.5 mL    Place 1 drop into both eyes At Bedtime    Primary open angle glaucoma, stage unspecified        levETIRAcetam 500 MG tablet    KEPPRA    180 tablet    Take 1 tablet (500 mg) by mouth 2 times daily    Nausea       levofloxacin 500 MG tablet    LEVAQUIN    7 tablet    Take 1 tablet (500 mg) by mouth daily    Urinary tract infection without hematuria, site unspecified       lidocaine 5 % Patch    LIDODERM    30 patch    APPLY 1 TO 3 PATCHES TO PAINFUL AREA AT ONCE FOR UP TO 12 HOURS WITHIN A 24 HOUR PERIOD REMOVE AFTER 12 HOURS    Chronic pain syndrome, 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 (H)       lisinopril 10 MG tablet    PRINIVIL/ZESTRIL    90 tablet    Take 1 tablet (10 mg) by mouth daily    Essential hypertension with goal blood pressure less than 130/80       MEDICATION GIVEN BY INTRATHECAL PUMP - INSTRUCTION      continuous May 19, 2017 - per [facility] in [place] [phone]: Conc: Bupivacaine 20 mg/mL and Fentanyl 2000 mcg/mL. Continuous: Bupivacaine 7.265 mg/day and Fentanyl 726.5 mcg/day. Boluses: Up to 7 boluses per 24-hr period of Bupivicaine 0.599 mg and Fentanyl 59.9 mcg  Pump Refill Date at [facility]: 7/2/17        metoprolol 25 MG 24 hr tablet    TOPROL-XL    30 tablet    TAKE 1 TABLET (25 MG) BY MOUTH DAILY    Old myocardial infarction       MULTIVITAMIN PO      Take 1 tablet by mouth daily        nitroGLYcerin 0.4 MG sublingual tablet    NITROSTAT    25 tablet    Place 1 tablet (0.4 mg) under the tongue every 5 minutes as needed for chest pain if you are still having symptoms after 3 doses (15 minutes) call 911.    Chronic systolic congestive heart failure (H), Old myocardial infarction       ondansetron 4 MG ODT tab    ZOFRAN-ODT    120 tablet    Take 1 tablet (4 mg) by mouth every 6 hours    Intractable vomiting with nausea, unspecified vomiting type       * order for DME     1 Month    Equipment being ordered: Depends briefs    Incontinence       * order for DME     1 Device    Equipment being ordered: Power Wheelchair    CVA  "(cerebral infarction), HTN (hypertension)       * order for DME     1 Units    Equipment being ordered: Nebulizer and supplies    Obstructive chronic bronchitis with exacerbation (H)       * order for DME     1 Box    Equipment being ordered: Glucerna daily shakes with each meal    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)       * order for DME     1 Device    ACcu check BID    Type 2 diabetes mellitus with diabetic peripheral angiopathy without gangrene, without long-term current use of insulin (H)       * order for DME     1 Units    Equipment being ordered: Nebulizer and tubing supplies    Simple chronic bronchitis (H)       * order for DME     1 Device    Equipment being ordered: CPAP supplies mask, hose, filters, cushion fax to [facility] at [phone] Disp: 10 DeviceRefills: prn Class: Local PrintStart: 2/10/2017    Chronic obstructive pulmonary disease, unspecified COPD type (H)       * order for DME     10 Device    Equipment being ordered: CPAP supplies mask, hose, filters, cushion  fax to [facility] at [phone]    COPD (chronic obstructive pulmonary disease) (H)       * order for DME     1 Device    Equipment being ordered: wheelchair seat cushion    Critical lower limb ischemia       * order for DME     1 Device    roho w/c cushion 18\" X 18\" for pressure relief    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 (H)       * order for DME     1 Device    Hospital bed with side rails    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 (H)       * order for DME     1 Device    Full electric hospital bed with half rails  Dx: A33210, I110, J449 Length of need: lifetime    Status post bilateral above knee amputation (H)       * order for DME     1 Device    Wheel Chair Cushion: 18 x 18 inch Roho cushion    Status post bilateral above knee amputation (H)       pantoprazole 40 MG EC tablet    PROTONIX     Take 40 mg by mouth daily        Phenytoin " Sodium Extended 200 MG Caps     60 capsule    Take 200 mg by mouth 2 times daily    Seizure disorder (H)       polyethylene glycol Packet    MIRALAX/GLYCOLAX     Take 17 g by mouth daily Dissolved in water or juice        pregabalin 75 MG capsule    LYRICA    120 capsule    Take 2 capsules (150 mg) by mouth 2 times daily    Pain in both upper extremities       prochlorperazine 10 MG tablet    COMPAZINE    20 tablet    Take 1 tablet (10 mg) by mouth every 8 hours as needed    Nausea with vomiting       risperiDONE 0.5 MG tablet    risperDAL     Take 0.5 mg by mouth At Bedtime        sucralfate 1 GM tablet    CARAFATE    40 tablet    Take 1 tablet (1 g) by mouth 4 times daily May dissolve in 10 mL water is necessary. (Start upon completion of carafate suspension)    Gastroesophageal reflux disease without esophagitis       traZODone 100 MG tablet    DESYREL    90 tablet    Take 1.5 tablets (150 mg) by mouth At Bedtime    Anxiety state       umeclidinium 62.5 MCG/INH oral inhaler    INCRUSE ELLIPTA    3 Inhaler    Inhale 1 puff into the lungs daily    Chronic obstructive pulmonary disease, unspecified COPD type (H)       vitamin D 2000 UNITS tablet     100 tablet    Take 2,000 Units by mouth daily.        zinc 50 MG Tabs      Take 1 tablet by mouth daily        * Notice:  This list has 16 medication(s) that are the same as other medications prescribed for you. Read the directions carefully, and ask your doctor or other care provider to review them with you.

## 2017-07-25 NOTE — PROGRESS NOTES
7/25/17: Elvira received call from ULISES Shine with [facility] stating that member would benefit from a foldable toilet safety frame.  Member has one for her bathroom however when she is on another level of her AL or out at Dr. calixto (in which she has numerous appts) the bathrooms are not handicap accessible for a double amputee and she has a lot of difficulty going to the bathroom and ends up using incontinence products.   She states she leaks through these as she is out and about for long periods for her dr calixto at times.  Member is looking into surgery and is in process of working with Urology however needs to wait until she can be off of her blood thinners.   Member also has recurrent UTI's.   Fátima spoke with staff and maintenance at the AL and they state for safety purposes they cannot put a toilet safety frame in the other bathrooms.   Fátima states there is one in a magazine that member is looking at.  Fátima will send link to ELVIRA.     ELVIRA receive link.  Spoke with Bern at [facility] -  emailed Bren with the information - she will look into this and get back to CM.     Jamie Sharma RN, N  [employer]

## 2017-07-26 DIAGNOSIS — G40.909 SEIZURE DISORDER (H): ICD-10-CM

## 2017-07-26 LAB — PHENYTOIN SERPL-MCNC: 13.5 MG/L (ref 10–20)

## 2017-07-26 PROCEDURE — 80185 ASSAY OF PHENYTOIN TOTAL: CPT | Performed by: INTERNAL MEDICINE

## 2017-07-26 PROCEDURE — 80186 ASSAY OF PHENYTOIN FREE: CPT | Mod: 90 | Performed by: INTERNAL MEDICINE

## 2017-07-26 PROCEDURE — 99000 SPECIMEN HANDLING OFFICE-LAB: CPT | Performed by: INTERNAL MEDICINE

## 2017-07-26 PROCEDURE — 36415 COLL VENOUS BLD VENIPUNCTURE: CPT | Performed by: INTERNAL MEDICINE

## 2017-07-27 LAB — PHENYTOIN FREE SERPL-MCNC: 0.93 UG/ML

## 2017-07-27 NOTE — PROGRESS NOTES
7/27/17: Received quote for foldable toilet frame from Bren at [facility].   CPS and POC updated.  CPS sent to CMS to submit auth for EW item.    Jamie Sharma RN, N  [employer]

## 2017-07-31 ENCOUNTER — ALLIED HEALTH/NURSE VISIT (OUTPATIENT)
Dept: PHARMACY | Facility: CLINIC | Age: 67
End: 2017-07-31
Payer: COMMERCIAL

## 2017-07-31 DIAGNOSIS — F06.4 ANXIETY DISORDER DUE TO GENERAL MEDICAL CONDITION: ICD-10-CM

## 2017-07-31 DIAGNOSIS — F25.9 SCHIZOAFFECTIVE DISORDER, UNSPECIFIED TYPE (H): ICD-10-CM

## 2017-07-31 DIAGNOSIS — J30.2 CHRONIC SEASONAL ALLERGIC RHINITIS, UNSPECIFIED TRIGGER: ICD-10-CM

## 2017-07-31 DIAGNOSIS — F43.21 ADJUSTMENT DISORDER WITH DEPRESSED MOOD: ICD-10-CM

## 2017-07-31 DIAGNOSIS — M62.838 MUSCLE SPASM: Primary | ICD-10-CM

## 2017-07-31 DIAGNOSIS — G89.4 CHRONIC PAIN SYNDROME: ICD-10-CM

## 2017-07-31 DIAGNOSIS — G25.81 RESTLESS LEGS SYNDROME (RLS): ICD-10-CM

## 2017-07-31 DIAGNOSIS — G47.01 INSOMNIA DUE TO MEDICAL CONDITION: ICD-10-CM

## 2017-07-31 PROCEDURE — 99607 MTMS BY PHARM ADDL 15 MIN: CPT | Performed by: PHARMACIST

## 2017-07-31 PROCEDURE — 99606 MTMS BY PHARM EST 15 MIN: CPT | Performed by: PHARMACIST

## 2017-07-31 NOTE — MR AVS SNAPSHOT
After Visit Summary   7/31/2017    Sonya Foote    MRN: [id number]           Patient Information     Date Of Birth          1950        Visit Information        Provider Department      7/31/2017 10:30 AM Elizabeth Rose, [facility]        Today's Diagnoses     Muscle spasm    -  1    Schizoaffective disorder, unspecified type (H)        Chronic pain syndrome        Anxiety disorder due to general medical condition        Adjustment disorder with depressed mood        Insomnia due to medical condition        Restless legs syndrome (RLS)        Chronic seasonal allergic rhinitis, unspecified trigger          Care Instructions    Recommendations from today's MTM visit:                                                        1.  Please try to limit Cyclobenzaprine use.  Reduce to half of a tablet (5mg) twice daily as needed (with last dose before bedtime).  If feel able, in a couple weeks, reduce to half-tablet (5mg) once daily as needed before bedtime.    Next MTM visit: one month over the phone, sooner if necessary    To schedule another MTM appointment, please call me directly or you may call the MTM scheduling line at [phone] or toll-free at [phone].     My Clinical Pharmacist's contact information:                                                      It was a pleasure talking to you today!  Please feel free to contact me with any questions or concerns you have.      Elizabeth Rose, Pharm.D.,[facility] [place] [facility]  Board Certified Geriatric Pharmacist  Medication Therapy Management Pharmacist  [phone]      You may receive a survey about the MTM services you received.  I would appreciate your feedback to help me serve you better in the future. Please fill it out and return it when you can. Your comments will be anonymous.                      Follow-ups after your visit        Your next 10 appointments already scheduled     Aug 02, 2017  7:30 AM CDT   VISUAL FIELD with [facility] EYE VISUAL  FIELD   Eye Clinic ([facility])    [address]  [address]  [address]  [facility] [address]   [phone]            Aug 02, 2017  8:00 AM CDT   NEW GENERAL with Daniela Valerio MD   Eye Clinic ([facility])    [address]  [address]  [address]  [facility] [address]   [phone]            Aug 28, 2017  1:30 PM CDT   (Arrive by 1:15 PM)   Return Visit with Alina Jennings MD   [facility] ([facility])    [address]  3rd Floor  [facility] [address] [phone]            Sep 08, 2017 10:00 AM CDT   (Arrive by 9:45 AM)   New Patient Visit with VICTOR M Floyd CNP   [facility] ([facility])    [address]  4th Floor  [facility] [id number] [phone]            Sep 27, 2017 11:15 AM CDT   Return Visit with Bj Anderson MD   [facility] [place] ([facility])    [address]  [facility] [address] [phone]            Oct 18, 2017 12:00 PM CDT   Return Visit with Elizabeth Oliver MD   [facility] [place] ([facility])    [address]  [address]  [facility] [address]   [phone]            Nov 10, 2017  9:20 AM CST   (Arrive by 9:05 AM)   RETURN DIABETES with Michelle Irizarry MD   [facility] ([facility])    [address]  3rd Floor  [facility] [id number] [phone]              Who to contact     If you have questions or need follow up information about today's clinic visit or your schedule please contact [place] [facility] directly at [phone].  Normal or non-critical lab and imaging results will be communicated to you by MyChart, letter or phone within 4 business days after the clinic has  "received the results. If you do not hear from us within 7 days, please contact the clinic through [employer] or phone. If you have a critical or abnormal lab result, we will notify you by phone as soon as possible.  Submit refill requests through [employer] or call your pharmacy and they will forward the refill request to us. Please allow 3 business days for your refill to be completed.          Additional Information About Your Visit        Shoobs Information     [employer]obs lets you send messages to your doctor, view your test results, renew your prescriptions, schedule appointments and more. To sign up, go to [email or web address] . Click on \"Log in\" on the left side of the screen, which will take you to the Welcome page. Then click on \"Sign up Now\" on the right side of the page.     You will be asked to enter the access code listed below, as well as some personal information. Please follow the directions to create your username and password.     Your access code is: [email or web address]  Expires: 10/17/2017  1:21 PM     Your access code will  in 90 days. If you need help or a new code, please call your [place] clinic or [phone].        Care EveryWhere ID     This is your Care EveryWhere ID. This could be used by other organizations to access your [place] medical records  [id number]         Blood Pressure from Last 3 Encounters:   17 143/81   17 152/74   06/15/17 131/81    Weight from Last 3 Encounters:   17 133 lb (60.3 kg)   17 133 lb (60.3 kg)   17 136 lb 3.9 oz (61.8 kg)              Today, you had the following     No orders found for display         Today's Medication Changes          These changes are accurate as of: 17 11:59 PM.  If you have any questions, ask your nurse or doctor.               These medicines have changed or have updated prescriptions.        Dose/Directions    aspirin 81 MG EC tablet   This may have changed:  when to take this   Used for:  Unstable " angina (H)        Dose:  81 mg   Take 1 tablet (81 mg) by mouth daily   Quantity:  90 tablet   Refills:  3       atorvastatin 40 MG tablet   Commonly known as:  LIPITOR   This may have changed:  when to take this   Used for:  Hyperlipidemia LDL goal <100        Dose:  40 mg   Take 1 tablet (40 mg) by mouth daily   Quantity:  90 tablet   Refills:  3       hydrochlorothiazide 12.5 MG capsule   Commonly known as:  MICROZIDE   This may have changed:  additional instructions   Used for:  Essential hypertension with goal blood pressure less than 130/80        Dose:  12.5 mg   Take 1 capsule (12.5 mg) by mouth daily   Quantity:  90 capsule   Refills:  3       ondansetron 4 MG ODT tab   Commonly known as:  ZOFRAN-ODT   This may have changed:    - when to take this  - reasons to take this   Used for:  Intractable vomiting with nausea, unspecified vomiting type        Dose:  4 mg   Take 1 tablet (4 mg) by mouth every 6 hours   Quantity:  120 tablet   Refills:  0       Phenytoin Sodium Extended 200 MG Caps   This may have changed:  additional instructions   Used for:  Seizure disorder (H)        Dose:  200 mg   Take 200 mg by mouth 2 times daily   Quantity:  60 capsule   Refills:  0                Primary Care Provider Office Phone # Fax #    Allan Casey [phone]       [facility] [address] [place] [phone]        Equal Access to Services     KARI CARREON AH: Hadii shaheen youo Soomaali, waaxda luqadaha, qaybta kaalmada adeegyada, tonie marroquin. So [facility] [phone].    ATENCIÓN: Si habla español, tiene a briones disposición servicios gratuitos de asistencia lingüística. Llame al [phone].    We comply with applicable federal civil rights laws and [place] laws. We do not discriminate on the basis of race, color, national origin, age, disability sex, sexual orientation or gender identity.            Thank you!     Thank you for choosing [facility]  for your care. Our goal is always to provide you with excellent care. Hearing back from our patients is one way we can continue to improve our services. Please take a few minutes to complete the written survey that you may receive in the mail after your visit with us. Thank you!             Your Updated Medication List - Protect others around you: Learn how to safely use, store and throw away your medicines at www.disposemymeds.org.          This list is accurate as of: 7/31/17 11:59 PM.  Always use your most recent med list.                   Brand Name Dispense Instructions for use Diagnosis    ACETAMINOPHEN PO      Take 1,000 mg by mouth every 6 hours as needed for fever Taking q4-6 hours, per MAR        ADVAIR DISKUS 250-50 MCG/DOSE diskus inhaler   Generic drug:  fluticasone-salmeterol      Inhale 1 puff into the lungs 2 times daily        albuterol (2.5 MG/3ML) 0.083% neb solution     360 mL    INHALE 1 VIAL VIA NEBULIZER EVERY 6 HOURS AS NEEDED    Chronic obstructive pulmonary disease, unspecified COPD type (H)       aspirin 81 MG EC tablet     90 tablet    Take 1 tablet (81 mg) by mouth daily    Unstable angina (H)       atorvastatin 40 MG tablet    LIPITOR    90 tablet    Take 1 tablet (40 mg) by mouth daily    Hyperlipidemia LDL goal <100       blood glucose monitoring meter device kit     1 kit    Use to test blood sugars 3 times daily or as directed.    Type 2 diabetes, HbA1C goal < 8% (H)       blood glucose monitoring test strip    ONE TOUCH ULTRA    3 Box    Use to test blood sugars 3 times daily or as directed.    Type 2 diabetes mellitus with diabetic peripheral angiopathy without gangrene, without long-term current use of insulin (H)       calcium citrate-vitamin D 315-250 MG-UNIT Tabs per tablet    CITRACAL    120 tablet    Take 2 tablets by mouth daily    Osteoporosis       cetirizine 10 MG tablet    zyrTEC    90 tablet    Take 1 tablet (10 mg) by mouth daily as needed for  allergies    Seasonal allergic rhinitis, unspecified allergic rhinitis trigger       clopidogrel 75 MG tablet    PLAVIX    90 tablet    Take 1 tablet (75 mg) by mouth daily    PAD (peripheral artery disease) (H), UGI bleed, Osteoporosis, Nausea       CYANOCOBALAMIN PO      Take 2,000 mcg by mouth daily        cyclobenzaprine 10 MG tablet    FLEXERIL    30 tablet    Take 0.5-1 tablets (5-10 mg) by mouth 3 times daily as needed for muscle spasms    Cervicalgia       diclofenac 1 % Gel topical gel    VOLTAREN    100 g    Apply 2 g topically 4 times daily to hands    Primary osteoarthritis of both hands       dorzolamide 2 % ophthalmic solution    TRUSOPT     Place 1 drop into both eyes 2 times daily        * EASY TOUCH LANCETS 30G/TWIST Misc           * thin lancets    NO BRAND SPECIFIED    1 Box    Use to test blood sugar 3 times daily or as directed.    Type 2 diabetes, HbA1C goal < 8% (H)       * blood glucose monitoring lancets     3 Box    Use to test blood sugar 3 times daily or as directed.    Type 2 diabetes, HbA1C goal < 8% (H)       EPINEPHrine 0.15 MG/0.3ML injection 2-pack    EPIPEN JR    0.6 mL    Inject 0.3 mLs (0.15 mg) into the muscle as needed for anaphylaxis    Bee sting reaction, undetermined intent, subsequent encounter       ESCITALOPRAM OXALATE PO      Take 10 mg by mouth daily        estradiol 0.1 MG/GM cream    ESTRACE VAGINAL    42.5 g    Use dime size amount 3x a week at night    Personal history of urinary tract infection       estradiol 1 MG tablet    ESTRACE    90 tablet    Take 1 tablet (1 mg) by mouth daily    Person who has had sex change operation       ferrous sulfate 325 (65 FE) MG tablet    IRON    60 tablet    Take 1 tablet (325 mg) by mouth 2 times daily With meals        fluticasone 50 MCG/ACT spray    FLONASE     Spray 1 spray into both nostrils daily        hydrochlorothiazide 12.5 MG capsule    MICROZIDE    90 capsule    Take 1 capsule (12.5 mg) by mouth daily    Essential  hypertension with goal blood pressure less than 130/80       HYDROmorphone 2 MG tablet    DILAUDID    30 tablet    Take 1 tablet (2 mg) by mouth every 3 hours as needed for moderate to severe pain        RENÉE Pack      Take 1 packet by mouth 2 times daily        latanoprost 0.005 % ophthalmic solution    XALATAN    2.5 mL    Place 1 drop into both eyes At Bedtime    Primary open angle glaucoma, stage unspecified       levETIRAcetam 500 MG tablet    KEPPRA    180 tablet    Take 1 tablet (500 mg) by mouth 2 times daily    Nausea       levofloxacin 500 MG tablet    LEVAQUIN    7 tablet    Take 1 tablet (500 mg) by mouth daily    Urinary tract infection without hematuria, site unspecified       lidocaine 5 % Patch    LIDODERM    30 patch    APPLY 1 TO 3 PATCHES TO PAINFUL AREA AT ONCE FOR UP TO 12 HOURS WITHIN A 24 HOUR PERIOD REMOVE AFTER 12 HOURS    Chronic pain syndrome, 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 (H)       lisinopril 10 MG tablet    PRINIVIL/ZESTRIL    90 tablet    Take 1 tablet (10 mg) by mouth daily    Essential hypertension with goal blood pressure less than 130/80       MEDICATION GIVEN BY INTRATHECAL PUMP - INSTRUCTION      continuous May 19, 2017 - per [facility] [place] [phone]: Conc: Bupivacaine 20 mg/mL and Fentanyl 2000 mcg/mL. Continuous: Bupivacaine 7.265 mg/day and Fentanyl 726.5 mcg/day. Boluses: Up to 7 boluses per 24-hr period of Bupivicaine 0.599 mg and Fentanyl 59.9 mcg  Pump Refill Date at [facility]: 7/2/17        metoprolol 25 MG 24 hr tablet    TOPROL-XL    30 tablet    TAKE 1 TABLET (25 MG) BY MOUTH DAILY    Old myocardial infarction       MULTIVITAMIN PO      Take 1 tablet by mouth daily        nitroGLYcerin 0.4 MG sublingual tablet    NITROSTAT    25 tablet    Place 1 tablet (0.4 mg) under the tongue every 5 minutes as needed for chest pain if you are still having symptoms after 3 doses (15 minutes) call 911.     "Chronic systolic congestive heart failure (H), Old myocardial infarction       ondansetron 4 MG ODT tab    ZOFRAN-ODT    120 tablet    Take 1 tablet (4 mg) by mouth every 6 hours    Intractable vomiting with nausea, unspecified vomiting type       * order for DME     1 Month    Equipment being ordered: Depends briefs    Incontinence       * order for DME     1 Device    Equipment being ordered: Power Wheelchair    CVA (cerebral infarction), HTN (hypertension)       * order for DME     1 Units    Equipment being ordered: Nebulizer and supplies    Obstructive chronic bronchitis with exacerbation (H)       * order for DME     1 Box    Equipment being ordered: Glucerna daily shakes with each meal    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)       * order for DME     1 Device    ACcu check BID    Type 2 diabetes mellitus with diabetic peripheral angiopathy without gangrene, without long-term current use of insulin (H)       * order for DME     1 Units    Equipment being ordered: Nebulizer and tubing supplies    Simple chronic bronchitis (H)       * order for DME     1 Device    Equipment being ordered: CPAP supplies mask, hose, filters, cushion fax to [facility] at [phone] Disp: 10 DeviceRefills: prn Class: Local PrintStart: 2/10/2017    Chronic obstructive pulmonary disease, unspecified COPD type (H)       * order for DME     10 Device    Equipment being ordered: CPAP supplies mask, hose, filters, cushion  fax to [facility] at [phone]    COPD (chronic obstructive pulmonary disease) (H)       * order for DME     1 Device    Equipment being ordered: wheelchair seat cushion    Critical lower limb ischemia       * order for DME     1 Device    roho w/c cushion 18\" X 18\" for pressure relief    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 (H)       * order for DME     1 Device    Hospital bed with side rails    Status post below knee amputation of right lower " extremity (H)       * order for DME     1 Device    Full electric hospital bed with half rails  Dx: O72988, I110, J449 Length of need: lifetime    Status post bilateral above knee amputation (H)       * order for DME     1 Device    Wheel Chair Cushion: 18 x 18 inch Roho cushion    Status post bilateral above knee amputation (H)       pantoprazole 40 MG EC tablet    PROTONIX     Take 40 mg by mouth daily        Phenytoin Sodium Extended 200 MG Caps     60 capsule    Take 200 mg by mouth 2 times daily    Seizure disorder (H)       polyethylene glycol Packet    MIRALAX/GLYCOLAX     Take 17 g by mouth daily Dissolved in water or juice        pregabalin 75 MG capsule    LYRICA    120 capsule    Take 2 capsules (150 mg) by mouth 2 times daily    Pain in both upper extremities       prochlorperazine 10 MG tablet    COMPAZINE    20 tablet    Take 1 tablet (10 mg) by mouth every 8 hours as needed    Nausea with vomiting       risperiDONE 0.5 MG tablet    risperDAL     Take 0.5 mg by mouth At Bedtime        sucralfate 1 GM tablet    CARAFATE    40 tablet    Take 1 tablet (1 g) by mouth 4 times daily May dissolve in 10 mL water is necessary. (Start upon completion of carafate suspension)    Gastroesophageal reflux disease without esophagitis       traZODone 100 MG tablet    DESYREL    90 tablet    Take 1.5 tablets (150 mg) by mouth At Bedtime    Anxiety state       umeclidinium 62.5 MCG/INH oral inhaler    INCRUSE ELLIPTA    3 Inhaler    Inhale 1 puff into the lungs daily    Chronic obstructive pulmonary disease, unspecified COPD type (H)       vitamin D 2000 UNITS tablet     100 tablet    Take 2,000 Units by mouth daily.        zinc 50 MG Tabs      Take 1 tablet by mouth daily        * Notice:  This list has 16 medication(s) that are the same as other medications prescribed for you. Read the directions carefully, and ask your doctor or other care provider to review them with you.

## 2017-07-31 NOTE — PROGRESS NOTES
"SUBJECTIVE/OBJECTIVE:                                                    Sonya Foote is a 67 year old female called for a follow-up visit for Medication Therapy Management.  She was referred to me from [facility].     Chief Complaint: follow-up from 5/22/17 [facility] visit; significant polypharmacy.    Allergies/ADRs: Reviewed in Epic  Tobacco: History of tobacco dependence - quit several years ago   Alcohol: none  Activity: ambulates by wheelchair; both legs amputated  PMH: Reviewed in Epic    Medication Adherence: has assistance setting up med boxes    Depression/Anxiety/Schizoaffective/Insomnia:  Current medications include: Escitalopram 10mg once daily and Risperidone 0.5mg at bedtime, Trazodone 150mg at bedtime.  Trazodone dose was increased end of June. Sleeping better, notes mood \"depends.\"  Recent loss of significant other.  Talks about her quite a bit during visit, says she has thought about stopping her respiratory meds and letting herself \"die naturally.\"  She is in grief counseling which she feels is helpful.  Family has been visiting her.  She states she would never commit suicide, but has thought about stopping meds so that she could join her partner.      RLS:  Current medication includes Lyrica 150mg bid.  Both legs amputated, and so Ropinirole dc'd following our initial visit because pt felt she did not need it & noted no RLS symptoms.  Was also tapered off of Gabapentin due to Lyrica use.  However, she began to report bothersome RLS symptoms that occured during daytime as well as nigh.  We increased Lyrica dose, and she notes today that this has been very helpful and denies RLS symptoms or side effects from Lyrica increase.      Allergic rhinitis: Current medications include cetirizine 10mg once daily prn and fluticasone nasal spray 1 spray(s) in each nostril once daily. Primary symptoms are runny nose and sneezing. Pt feels that current therapy is effective.  She did try using only the nasal " spray, but noticed increased symptoms, so during allergy season prefers to take the Cetrizine daily as well.    Muscle spasms/Pain:  Current medications include prn Cyclobenzaprine 5-10mg tid prn for muscle spasms.  She is taking 5mg three times daily on a regular basis. Also using the lidoderm patch; applies 2 patches each night -on for 12hrs, off for 12hrs.  Feels lidoderm has been very helpful.  Continues on Fentanyl intrathecal pump.  Reports she hasn't been getting her hydromorphone, but doing ok without. Notes spasm in upper back/neck, and states cyclobenzaprine is helpful for this.  Feels she needs the cyclobenzaprine; has woken in middle of the night due to spasms.  Is willing to try a reduction.  Will apply heat & this is helpful, but only temporarily per pt.  She has tried Icy Hot in past without much effect per previous report.  Potential med side effects she is experiencing: dizziness, orthostasis, confusion.    Current labs include:  BP Readings from Last 3 Encounters:   07/25/17 143/81   07/19/17 152/74   06/15/17 131/81     Today's Vitals: There were no vitals taken for this visit.  Lab Results   Component Value Date    A1C 4.1 01/24/2017   .  Lab Results   Component Value Date    CHOL 155 06/06/2016     Lab Results   Component Value Date    TRIG 62 06/06/2016     Lab Results   Component Value Date    HDL 65 06/06/2016     Lab Results   Component Value Date    LDL 77 06/06/2016       Liver Function Studies -   Recent Labs   Lab Test  01/24/17   1156   PROTTOTAL  7.4   ALBUMIN  3.0*   BILITOTAL  0.1*   ALKPHOS  162*   AST  29   ALT  35       Lab Results   Component Value Date    UCRR 54 02/05/2016    MICROL 6 02/05/2016    UMALCR 10.72 02/05/2016       Last Basic Metabolic Panel:  Lab Results   Component Value Date     06/01/2017      Lab Results   Component Value Date    POTASSIUM 3.8 06/01/2017     Lab Results   Component Value Date    CHLORIDE 107 06/01/2017     Lab Results   Component Value  Date    BUN 7 06/01/2017     Lab Results   Component Value Date    CR 0.54 06/02/2017     GFR Estimate   Date Value Ref Range Status   06/02/2017 >90  Non  GFR Calc   >60 mL/min/1.7m2 Final   06/01/2017 89 >60 mL/min/1.7m2 Final     Comment:     Non  GFR Calc   05/31/2017 77 >60 mL/min/1.7m2 Final     Comment:     Non  GFR Calc     GFR Estimate If Black   Date Value Ref Range Status   06/02/2017 >90   GFR Calc   >60 mL/min/1.7m2 Final   06/01/2017 >90   GFR Calc   >60 mL/min/1.7m2 Final   05/31/2017 >90   GFR Calc   >60 mL/min/1.7m2 Final     TSH   Date Value Ref Range Status   11/04/2016 0.84 0.40 - 4.00 mU/L Final   ]    Most Recent Immunizations   Administered Date(s) Administered     Influenza (High Dose) 3 valent vaccine 09/03/2016     Influenza (IIV3) 09/01/2013     Pneumococcal (PCV 13) 10/22/2015     Pneumococcal 23 valent 02/22/2014     TD (ADULT, 7+) 01/01/2011         ASSESSMENT:                                                       Current medications were reviewed today.     Medication Adherence: no issues identified     Depression/Anxiety/Schizoaffective/Insomnia:  Stable.  Sleep improved.  Although she discusses she does not wish to prolong her life, she states not suicidal.  Encouraged continuing to engage with family, grief counseling.    RLS:  Improved.    Allergic rhinitis: Stable.    Muscle spasms/Pain:  Again discussed risks of Cylcobenzaprine use; high risk med in elderly - highly anticholinergic and increases risk of confusion, CNS and respiratory depression, falls, dizziness, etc., especially in combo with narcotics; should avoid.  She is agreeable to reduction in dose.  Goal to d'c altogether.     PLAN:                                                      1.  Please try to limit Cyclobenzaprine use.  Reduce to half of a tablet (5mg) twice daily as needed (with last dose before bedtime).  If feel  able, in a couple weeks, reduce to half-tablet (5mg) once daily as needed before bedtime.        I spent 15 minutes with this patient today. A copy of the visit note was provided to the patient's primary care provider.    Will follow up in 1 month sooner if necessary.    The patient was mailed a summary of these recommendations as an after visit summary.     Elizabeth Rose, Pharm.D.,[facility] [place]  Board Certified Geriatric Pharmacist  Medication Therapy Management Pharmacist  [phone]

## 2017-08-01 NOTE — PROGRESS NOTES
8/1/17: Received call form [employer] with [facility], asking about foldable toilet safety frame and estimated delivery date.   CM placed call to [employer] - per Sonali - they have not received [employer] auth - they will check again on 8/5.    Cm emailed Fátima with this information.   Forwarded to CMS to monitor delivery.     Jamie Sharma RN, PHN  [facility]

## 2017-08-01 NOTE — PATIENT INSTRUCTIONS
Recommendations from today's MTM visit:                                                        1.  Please try to limit Cyclobenzaprine use.  Reduce to half of a tablet (5mg) twice daily as needed (with last dose before bedtime).  If feel able, in a couple weeks, reduce to half-tablet (5mg) once daily as needed before bedtime.    Next MTM visit: one month over the phone, sooner if necessary    To schedule another MTM appointment, please call me directly or you may call the MTM scheduling line at [phone] or toll-free at [phone].     My Clinical Pharmacist's contact information:                                                      It was a pleasure talking to you today!  Please feel free to contact me with any questions or concerns you have.      Elizabeth Rose, Pharm.D.,[facility] [place]  Board Certified Geriatric Pharmacist  Medication Therapy Management Pharmacist  [phone]      You may receive a survey about the MTM services you received.  I would appreciate your feedback to help me serve you better in the future. Please fill it out and return it when you can. Your comments will be anonymous.

## 2017-08-02 ENCOUNTER — APPOINTMENT (OUTPATIENT)
Dept: OPHTHALMOLOGY | Facility: CLINIC | Age: 67
End: 2017-08-02
Attending: OPHTHALMOLOGY
Payer: COMMERCIAL

## 2017-08-02 DIAGNOSIS — Z96.1 PSEUDOPHAKIA, RIGHT EYE: ICD-10-CM

## 2017-08-02 DIAGNOSIS — H40.1133 PRIMARY OPEN ANGLE GLAUCOMA OF BOTH EYES, SEVERE STAGE: Primary | ICD-10-CM

## 2017-08-02 DIAGNOSIS — H52.201 MYOPIA WITH ASTIGMATISM, RIGHT: ICD-10-CM

## 2017-08-02 DIAGNOSIS — H52.11 MYOPIA WITH ASTIGMATISM, RIGHT: ICD-10-CM

## 2017-08-02 PROCEDURE — 92015 DETERMINE REFRACTIVE STATE: CPT | Mod: 52,ZF

## 2017-08-02 PROCEDURE — 99212 OFFICE O/P EST SF 10 MIN: CPT | Mod: ZF

## 2017-08-02 ASSESSMENT — SLIT LAMP EXAM - LIDS
COMMENTS: NORMAL
COMMENTS: NORMAL

## 2017-08-02 ASSESSMENT — EXTERNAL EXAM - RIGHT EYE: OD_EXAM: NORMAL

## 2017-08-02 ASSESSMENT — REFRACTION_MANIFEST
OS_SPHERE: BALANCE
OD_SPHERE: -1.00
OD_CYLINDER: SPHERE
OD_ADD: +3.25

## 2017-08-02 ASSESSMENT — VISUAL ACUITY
OD_CC: 20/70
METHOD: SNELLEN - LINEAR
OS_CC: NLP
CORRECTION_TYPE: GLASSES

## 2017-08-02 ASSESSMENT — TONOMETRY
OD_IOP_MMHG: 19
OS_IOP_MMHG: 18
IOP_METHOD: TONOPEN

## 2017-08-02 ASSESSMENT — CUP TO DISC RATIO
OS_RATIO: 0.9
OD_RATIO: 0.7

## 2017-08-02 ASSESSMENT — CONF VISUAL FIELD
OD_SUPERIOR_NASAL_RESTRICTION: 3
OS_SUPERIOR_TEMPORAL_RESTRICTION: 1
OS_INFERIOR_TEMPORAL_RESTRICTION: 1
OS_SUPERIOR_NASAL_RESTRICTION: 1
OD_SUPERIOR_TEMPORAL_RESTRICTION: 1
OD_INFERIOR_NASAL_RESTRICTION: 3
OS_INFERIOR_NASAL_RESTRICTION: 1
OD_INFERIOR_TEMPORAL_RESTRICTION: 1

## 2017-08-02 ASSESSMENT — EXTERNAL EXAM - LEFT EYE: OS_EXAM: NORMAL

## 2017-08-02 ASSESSMENT — REFRACTION_WEARINGRX
OD_CYLINDER: +1.00
OD_SPHERE: -0.75
OD_AXIS: 30

## 2017-08-02 NOTE — PROGRESS NOTES
HPI  Sonya Foote is a 67 year old female, followed by Dr. Minor, here for follow up for new glasses, old ones broken now. She needs updates on prescriptions. Her last A1c 4.8, off insulin and PO meds. Taking glaucoma meds daily. Denies any pain, irritation, flashes, floaters.     POH:  - POAG  - CEIOL   - NLP, OS   PMH: DM     Meds:  Cosopt (Timolol/Dorzolamide) which is a blue top drop 2x/day (12 hours apart) in both eyes.   Latanoprost which is a teal top drop at bedtime in both eyes.   Alphagan (Brimonidine) which is a purple top drop 2x/day (12 hours apart) in both eyes.    Assessment & Plan    (H40.1133) Primary open angle glaucoma of both eyes, severe stage  (primary encounter diagnosis)  Comment: IOP good today  Plan: Continue cosopt BID OU, continue latanoprost QHS OU, continue alphagan BID OU     (Z96.1) Pseudophakia, right eye  (H52.11,  H52.201) Myopia with astigmatism, right  Comment: Good lens position, stable, refracted for new prescription   Plan: Patient provided with new glasses prescription     -----------------------------------------------------------------------------------    Patient disposition: RTC to see Dr. Robin in 3 months when due for follow-up with Dr. Robin.    Teaching statement:  Complete documentation of historical and exam elements from today's encounter can be found in the full encounter summary report (not reduplicated in this progress note). I personally obtained the chief complaint(s) and history of present illness.  I confirmed and edited as necessary the review of systems, past medical/surgical history, family history, social history, and examination findings as documented by others; and I examined the patient myself. I personally reviewed the relevant tests, images, and reports as documented above.     I formulated and edited as necessary the assessment and plan and discussed the findings and management plan with the patient and family.    Daniela Valerio,  MD  Comprehensive Ophthalmology & Ocular Pathology  Department of Ophthalmology and Visual Neurosciences  [email or web address]  Pager [phone]

## 2017-08-02 NOTE — NURSING NOTE
Chief Complaints and History of Present Illnesses   Patient presents with     Follow Up For     Primary open angle glaucoma of both eyes, severe stage      HPI    Affected eye(s):  Both   Symptoms:        Duration:  6 months   Frequency:  Constant       Do you have eye pain now?:  No      Comments:  Pt. States that VA seems to be worsening RE.  Has been having daily headaches.   Occasional flashes and floaters REMeli 8:06 AM August 2, 2017

## 2017-08-02 NOTE — MR AVS SNAPSHOT
After Visit Summary   8/2/2017    Sonya Foote    MRN: [id number]           Patient Information     Date Of Birth          1950        Visit Information        Provider Department      8/2/2017 8:00 AM [facility]a Valerio [facility]        Today's Diagnoses     Primary open angle glaucoma of both eyes, severe stage    -  1    Pseudophakia, right eye        Myopia with astigmatism, right           Follow-ups after your visit        Follow-up notes from your care team     Return in about 3 months (around 11/2/2017) for when due for visit with Dr. Robin.      Your next 10 appointments already scheduled     Aug 28, 2017  1:30 PM CDT   (Arrive by 1:15 PM)   Return Visit with Alina Jennings MD   [facility] ([facility])    [address]  [address] [facility] [address]   [phone]            Sep 08, 2017 10:00 AM CDT   (Arrive by 9:45 AM)   New [facility] with [facility] ([facility])    [address]  [address] [facility] [address]   [phone]            Sep 27, 2017 11:15 AM CDT   Return Visit with Bj Anderson MD   [facility] ([facility])    [address]  [place] [facility] [address]   [phone]            Oct 18, 2017 12:00 PM CDT   Return Visit with Elizabeth Oliver MD   [facility] ([facility])    [address]  [address]  [place] [address]   [phone]            Nov 02, 2017  9:15 AM CDT   RETURN GLAUCOMA with Marely Robin MD   [facility] ([facility])    [address]  [address]  [address] [address]  [place] [address]   [phone]            Nov 10, 2017  9:20 AM CST   (Arrive by 9:05 AM)   RETURN DIABETES with Michelle Irizarry MD   [facility]  Endocrinology ([facility])    [address]  [address] [facility] [address] [phone]              Who to contact     Please call your clinic at [phone] to:    Ask questions about your health    Make or cancel appointments    Discuss your medicines    Learn about your test results    Speak to your doctor   If you have compliments or concerns about an experience at your clinic, or if you wish to file a complaint, please contact [facility] Patient Relations at [phone] or email us at [email or web address]         Additional Information About Your Visit        [employer] Information     [employer] is an electronic gateway that provides easy, online access to your medical records. With [employer], you can request a clinic appointment, read your test results, renew a prescription or communicate with your care team.     To sign up for OPPRTUNITYt visit the website at [email or web address]   You will be asked to enter the access code listed below, as well as some personal information. Please follow the directions to create your username and password.     Your access code is: [email or web address]  Expires: 10/17/2017  1:21 PM     Your access code will  in 90 days. If you need help or a new code, please contact your [facility] or call [phone] for assistance.        Care EveryWhere ID     This is your Care EveryWhere ID. This could be used by other organizations to access your [facility] medical records  [id number]         Blood Pressure from Last 3 Encounters:   17 143/81   17 152/74   06/15/17 131/81    Weight from Last 3 Encounters:   17 60.3 kg (133 lb)   17 60.3 kg (133 lb)   17 61.8 kg (136 lb 3.9 oz)              Today, you had the following     No orders found for display         Today's Medication Changes          These changes are accurate as of: 17  9:00 AM.  If you  have any questions, ask your nurse or doctor.               These medicines have changed or have updated prescriptions.        Dose/Directions    aspirin 81 MG EC tablet   This may have changed:  when to take this   Used for:  Unstable angina (H)        Dose:  81 mg   Take 1 tablet (81 mg) by mouth daily   Quantity:  90 tablet   Refills:  3       atorvastatin 40 MG tablet   Commonly known as:  LIPITOR   This may have changed:  when to take this   Used for:  Hyperlipidemia LDL goal <100        Dose:  40 mg   Take 1 tablet (40 mg) by mouth daily   Quantity:  90 tablet   Refills:  3       hydrochlorothiazide 12.5 MG capsule   Commonly known as:  MICROZIDE   This may have changed:  additional instructions   Used for:  Essential hypertension with goal blood pressure less than 130/80        Dose:  12.5 mg   Take 1 capsule (12.5 mg) by mouth daily   Quantity:  90 capsule   Refills:  3       ondansetron 4 MG ODT tab   Commonly known as:  ZOFRAN-ODT   This may have changed:    - when to take this  - reasons to take this   Used for:  Intractable vomiting with nausea, unspecified vomiting type        Dose:  4 mg   Take 1 tablet (4 mg) by mouth every 6 hours   Quantity:  120 tablet   Refills:  0       Phenytoin Sodium Extended 200 MG Caps   This may have changed:  additional instructions   Used for:  Seizure disorder (H)        Dose:  200 mg   Take 200 mg by mouth 2 times daily   Quantity:  60 capsule   Refills:  0                Primary Care Provider Office Phone # Fax #    Allan Casey [phone]       [facility] [address] [place] [address]        Equal Access to Services     IRAJ CARREON AH: Hadii shaheen Cohen, waaxda lutom, qaybta kaalmada shashank, tonie marroquin. So [facility] [phone].    ATENCIÓN: Si habla español, tiene a briones disposición servicios gratuitos de asistencia lingüística. Llame al [phone].    We comply with applicable  federal civil rights laws and [place] laws. We do not discriminate on the basis of race, color, national origin, age, disability sex, sexual orientation or gender identity.            Thank you!     Thank you for choosing [facility]  for your care. Our goal is always to provide you with excellent care. Hearing back from our patients is one way we can continue to improve our services. Please take a few minutes to complete the written survey that you may receive in the mail after your visit with us. Thank you!             Your Updated Medication List - Protect others around you: Learn how to safely use, store and throw away your medicines at www.disposemymeds.org.          This list is accurate as of: 8/2/17  9:00 AM.  Always use your most recent med list.                   Brand Name Dispense Instructions for use Diagnosis    ACETAMINOPHEN PO      Take 1,000 mg by mouth every 6 hours as needed for fever Taking q4-6 hours, per MAR        ADVAIR DISKUS 250-50 MCG/DOSE diskus inhaler   Generic drug:  fluticasone-salmeterol      Inhale 1 puff into the lungs 2 times daily        albuterol (2.5 MG/3ML) 0.083% neb solution     360 mL    INHALE 1 VIAL VIA NEBULIZER EVERY 6 HOURS AS NEEDED    Chronic obstructive pulmonary disease, unspecified COPD type (H)       aspirin 81 MG EC tablet     90 tablet    Take 1 tablet (81 mg) by mouth daily    Unstable angina (H)       atorvastatin 40 MG tablet    LIPITOR    90 tablet    Take 1 tablet (40 mg) by mouth daily    Hyperlipidemia LDL goal <100       blood glucose monitoring meter device kit     1 kit    Use to test blood sugars 3 times daily or as directed.    Type 2 diabetes, HbA1C goal < 8% (H)       blood glucose monitoring test strip    ONE TOUCH ULTRA    3 Box    Use to test blood sugars 3 times daily or as directed.    Type 2 diabetes mellitus with diabetic peripheral angiopathy without gangrene, without long-term current use of insulin (H)       calcium citrate-vitamin D  315-250 MG-UNIT Tabs per tablet    CITRACAL    120 tablet    Take 2 tablets by mouth daily    Osteoporosis       cetirizine 10 MG tablet    zyrTEC    90 tablet    Take 1 tablet (10 mg) by mouth daily as needed for allergies    Seasonal allergic rhinitis, unspecified allergic rhinitis trigger       clopidogrel 75 MG tablet    PLAVIX    90 tablet    Take 1 tablet (75 mg) by mouth daily    PAD (peripheral artery disease) (H), UGI bleed, Osteoporosis, Nausea       CYANOCOBALAMIN PO      Take 2,000 mcg by mouth daily        cyclobenzaprine 10 MG tablet    FLEXERIL    30 tablet    Take 0.5-1 tablets (5-10 mg) by mouth 3 times daily as needed for muscle spasms    Cervicalgia       diclofenac 1 % Gel topical gel    VOLTAREN    100 g    Apply 2 g topically 4 times daily to hands    Primary osteoarthritis of both hands       dorzolamide 2 % ophthalmic solution    TRUSOPT     Place 1 drop into both eyes 2 times daily        * EASY TOUCH LANCETS 30G/TWIST Misc           * thin lancets    NO BRAND SPECIFIED    1 Box    Use to test blood sugar 3 times daily or as directed.    Type 2 diabetes, HbA1C goal < 8% (H)       * blood glucose monitoring lancets     3 Box    Use to test blood sugar 3 times daily or as directed.    Type 2 diabetes, HbA1C goal < 8% (H)       EPINEPHrine 0.15 MG/0.3ML injection 2-pack    EPIPEN JR    0.6 mL    Inject 0.3 mLs (0.15 mg) into the muscle as needed for anaphylaxis    Bee sting reaction, undetermined intent, subsequent encounter       ESCITALOPRAM OXALATE PO      Take 10 mg by mouth daily        estradiol 0.1 MG/GM cream    ESTRACE VAGINAL    42.5 g    Use dime size amount 3x a week at night    Personal history of urinary tract infection       estradiol 1 MG tablet    ESTRACE    90 tablet    Take 1 tablet (1 mg) by mouth daily    Person who has had sex change operation       ferrous sulfate 325 (65 FE) MG tablet    IRON    60 tablet    Take 1 tablet (325 mg) by mouth 2 times daily With meals         fluticasone 50 MCG/ACT spray    FLONASE     Spray 1 spray into both nostrils daily        hydrochlorothiazide 12.5 MG capsule    MICROZIDE    90 capsule    Take 1 capsule (12.5 mg) by mouth daily    Essential hypertension with goal blood pressure less than 130/80       HYDROmorphone 2 MG tablet    DILAUDID    30 tablet    Take 1 tablet (2 mg) by mouth every 3 hours as needed for moderate to severe pain        RENÉE Pack      Take 1 packet by mouth 2 times daily        latanoprost 0.005 % ophthalmic solution    XALATAN    2.5 mL    Place 1 drop into both eyes At Bedtime    Primary open angle glaucoma, stage unspecified       levETIRAcetam 500 MG tablet    KEPPRA    180 tablet    Take 1 tablet (500 mg) by mouth 2 times daily    Nausea       levofloxacin 500 MG tablet    LEVAQUIN    7 tablet    Take 1 tablet (500 mg) by mouth daily    Urinary tract infection without hematuria, site unspecified       lidocaine 5 % Patch    LIDODERM    30 patch    APPLY 1 TO 3 PATCHES TO PAINFUL AREA AT ONCE FOR UP TO 12 HOURS WITHIN A 24 HOUR PERIOD REMOVE AFTER 12 HOURS    Chronic pain syndrome, 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 (H)       lisinopril 10 MG tablet    PRINIVIL/ZESTRIL    90 tablet    Take 1 tablet (10 mg) by mouth daily    Essential hypertension with goal blood pressure less than 130/80       MEDICATION GIVEN BY INTRATHECAL PUMP - INSTRUCTION      continuous May 19, 2017 - per [facility] [place] [phone]: Conc: Bupivacaine 20 mg/mL and Fentanyl 2000 mcg/mL. Continuous: Bupivacaine 7.265 mg/day and Fentanyl 726.5 mcg/day. Boluses: Up to 7 boluses per 24-hr period of Bupivicaine 0.599 mg and Fentanyl 59.9 mcg  Pump Refill Date at [facility]: 7/2/17        metoprolol 25 MG 24 hr tablet    TOPROL-XL    30 tablet    TAKE 1 TABLET (25 MG) BY MOUTH DAILY    Old myocardial infarction       MULTIVITAMIN PO      Take 1 tablet by mouth daily        nitroGLYcerin 0.4  "MG sublingual tablet    NITROSTAT    25 tablet    Place 1 tablet (0.4 mg) under the tongue every 5 minutes as needed for chest pain if you are still having symptoms after 3 doses (15 minutes) call 911.    Chronic systolic congestive heart failure (H), Old myocardial infarction       ondansetron 4 MG ODT tab    ZOFRAN-ODT    120 tablet    Take 1 tablet (4 mg) by mouth every 6 hours    Intractable vomiting with nausea, unspecified vomiting type       * order for DME     1 Month    Equipment being ordered: Depends briefs    Incontinence       * order for DME     1 Device    Equipment being ordered: Power Wheelchair    CVA (cerebral infarction), HTN (hypertension)       * order for DME     1 Units    Equipment being ordered: Nebulizer and supplies    Obstructive chronic bronchitis with exacerbation (H)       * order for DME     1 Box    Equipment being ordered: Glucerna daily shakes with each meal    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)       * order for DME     1 Device    ACcu check BID    Type 2 diabetes mellitus with diabetic peripheral angiopathy without gangrene, without long-term current use of insulin (H)       * order for DME     1 Units    Equipment being ordered: Nebulizer and tubing supplies    Simple chronic bronchitis (H)       * order for DME     1 Device    Equipment being ordered: CPAP supplies mask, hose, filters, cushion fax to [facility] at [phone] Disp: 10 DeviceRefills: prn Class: Local PrintStart: 2/10/2017    Chronic obstructive pulmonary disease, unspecified COPD type (H)       * order for DME     10 Device    Equipment being ordered: CPAP supplies mask, hose, filters, cushion  fax to [facility] at [phone]    COPD (chronic obstructive pulmonary disease) (H)       * order for DME     1 Device    Equipment being ordered: wheelchair seat cushion    Critical lower limb ischemia       * order for DME     1 Device    roho w/c cushion 18\" X 18\" " for pressure relief    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 (H)       * order for DME     1 Device    Hospital bed with side rails    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 (H)       * order for DME     1 Device    Full electric hospital bed with half rails  Dx: T58992, I110, J449 Length of need: lifetime    Status post bilateral above knee amputation (H)       * order for DME     1 Device    Wheel Chair Cushion: 18 x 18 inch Roho cushion    Status post bilateral above knee amputation (H)       pantoprazole 40 MG EC tablet    PROTONIX     Take 40 mg by mouth daily        Phenytoin Sodium Extended 200 MG Caps     60 capsule    Take 200 mg by mouth 2 times daily    Seizure disorder (H)       polyethylene glycol Packet    MIRALAX/GLYCOLAX     Take 17 g by mouth daily Dissolved in water or juice        pregabalin 75 MG capsule    LYRICA    120 capsule    Take 2 capsules (150 mg) by mouth 2 times daily    Pain in both upper extremities       prochlorperazine 10 MG tablet    COMPAZINE    20 tablet    Take 1 tablet (10 mg) by mouth every 8 hours as needed    Nausea with vomiting       risperiDONE 0.5 MG tablet    risperDAL     Take 0.5 mg by mouth At Bedtime        sucralfate 1 GM tablet    CARAFATE    40 tablet    Take 1 tablet (1 g) by mouth 4 times daily May dissolve in 10 mL water is necessary. (Start upon completion of carafate suspension)    Gastroesophageal reflux disease without esophagitis       traZODone 100 MG tablet    DESYREL    90 tablet    Take 1.5 tablets (150 mg) by mouth At Bedtime    Anxiety state       umeclidinium 62.5 MCG/INH oral inhaler    INCRUSE ELLIPTA    3 Inhaler    Inhale 1 puff into the lungs daily    Chronic obstructive pulmonary disease, unspecified COPD type (H)       vitamin D 2000 UNITS tablet     100 tablet    Take 2,000 Units by mouth daily.        zinc 50 MG Tabs      Take 1 tablet by mouth daily        * Notice:  This list has 16  medication(s) that are the same as other medications prescribed for you. Read the directions carefully, and ask your doctor or other care provider to review them with you.

## 2017-08-07 ENCOUNTER — APPOINTMENT (OUTPATIENT)
Dept: OPTOMETRY | Facility: CLINIC | Age: 67
End: 2017-08-07
Payer: COMMERCIAL

## 2017-08-07 PROCEDURE — 92342 FIT SPECTACLES MULTIFOCAL: CPT | Performed by: OPTOMETRIST

## 2017-08-08 ENCOUNTER — TELEPHONE (OUTPATIENT)
Dept: INTERNAL MEDICINE | Facility: CLINIC | Age: 67
End: 2017-08-08

## 2017-08-08 NOTE — TELEPHONE ENCOUNTER
Prior authorization    Medication name estradiol  Insurance medica  Insurance ID number [id number]  Prior authorization faxed through cover my meds

## 2017-08-13 ENCOUNTER — APPOINTMENT (OUTPATIENT)
Dept: CT IMAGING | Facility: CLINIC | Age: 67
End: 2017-08-13
Attending: EMERGENCY MEDICINE
Payer: COMMERCIAL

## 2017-08-13 ENCOUNTER — HOSPITAL ENCOUNTER (EMERGENCY)
Facility: CLINIC | Age: 67
Discharge: HOME | End: 2017-08-13
Attending: EMERGENCY MEDICINE | Admitting: EMERGENCY MEDICINE
Payer: COMMERCIAL

## 2017-08-13 VITALS
OXYGEN SATURATION: 100 % | HEART RATE: 79 BPM | DIASTOLIC BLOOD PRESSURE: 81 MMHG | TEMPERATURE: 98.1 F | SYSTOLIC BLOOD PRESSURE: 167 MMHG | HEIGHT: 67 IN | BODY MASS INDEX: 20.88 KG/M2 | RESPIRATION RATE: 16 BRPM | WEIGHT: 133 LBS

## 2017-08-13 DIAGNOSIS — Z89.511 STATUS POST BELOW KNEE AMPUTATION OF RIGHT LOWER EXTREMITY (H): ICD-10-CM

## 2017-08-13 DIAGNOSIS — M54.50 ACUTE LEFT-SIDED LOW BACK PAIN WITHOUT SCIATICA: ICD-10-CM

## 2017-08-13 DIAGNOSIS — G89.4 CHRONIC PAIN SYNDROME: ICD-10-CM

## 2017-08-13 LAB
ALBUMIN SERPL-MCNC: 2.7 G/DL (ref 3.4–5)
ALBUMIN UR-MCNC: NEGATIVE MG/DL
ALP SERPL-CCNC: 221 U/L (ref 40–150)
ALT SERPL W P-5'-P-CCNC: 44 U/L (ref 0–50)
ANION GAP SERPL CALCULATED.3IONS-SCNC: 9 MMOL/L (ref 3–14)
APPEARANCE UR: CLEAR
AST SERPL W P-5'-P-CCNC: 42 U/L (ref 0–45)
BACTERIA #/AREA URNS HPF: ABNORMAL /HPF
BASOPHILS # BLD AUTO: 0 10E9/L (ref 0–0.2)
BASOPHILS NFR BLD AUTO: 0.4 %
BILIRUB SERPL-MCNC: 0.2 MG/DL (ref 0.2–1.3)
BILIRUB UR QL STRIP: NEGATIVE
BUN SERPL-MCNC: 7 MG/DL (ref 7–30)
CALCIUM SERPL-MCNC: 9.3 MG/DL (ref 8.5–10.1)
CHLORIDE SERPL-SCNC: 105 MMOL/L (ref 94–109)
CO2 SERPL-SCNC: 25 MMOL/L (ref 20–32)
COLOR UR AUTO: ABNORMAL
CREAT SERPL-MCNC: 0.49 MG/DL (ref 0.52–1.04)
DIFFERENTIAL METHOD BLD: ABNORMAL
EOSINOPHIL # BLD AUTO: 0.2 10E9/L (ref 0–0.7)
EOSINOPHIL NFR BLD AUTO: 2.3 %
ERYTHROCYTE [DISTWIDTH] IN BLOOD BY AUTOMATED COUNT: 15 % (ref 10–15)
GFR SERPL CREATININE-BSD FRML MDRD: ABNORMAL ML/MIN/1.7M2
GLUCOSE SERPL-MCNC: 96 MG/DL (ref 70–99)
GLUCOSE UR STRIP-MCNC: NEGATIVE MG/DL
HCT VFR BLD AUTO: 32.9 % (ref 35–47)
HGB BLD-MCNC: 10.8 G/DL (ref 11.7–15.7)
HGB UR QL STRIP: NEGATIVE
IMM GRANULOCYTES # BLD: 0 10E9/L (ref 0–0.4)
IMM GRANULOCYTES NFR BLD: 0.3 %
KETONES UR STRIP-MCNC: NEGATIVE MG/DL
LACTATE BLD-SCNC: 1.4 MMOL/L (ref 0.7–2.1)
LEUKOCYTE ESTERASE UR QL STRIP: NEGATIVE
LIPASE SERPL-CCNC: 129 U/L (ref 73–393)
LYMPHOCYTES # BLD AUTO: 1.4 10E9/L (ref 0.8–5.3)
LYMPHOCYTES NFR BLD AUTO: 14.7 %
MCH RBC QN AUTO: 28.3 PG (ref 26.5–33)
MCHC RBC AUTO-ENTMCNC: 32.8 G/DL (ref 31.5–36.5)
MCV RBC AUTO: 86 FL (ref 78–100)
MONOCYTES # BLD AUTO: 1 10E9/L (ref 0–1.3)
MONOCYTES NFR BLD AUTO: 10 %
NEUTROPHILS # BLD AUTO: 7.1 10E9/L (ref 1.6–8.3)
NEUTROPHILS NFR BLD AUTO: 72.3 %
NITRATE UR QL: NEGATIVE
NRBC # BLD AUTO: 0 10*3/UL
NRBC BLD AUTO-RTO: 0 /100
PH UR STRIP: 7.5 PH (ref 5–7)
PLATELET # BLD AUTO: 371 10E9/L (ref 150–450)
POTASSIUM SERPL-SCNC: 3.6 MMOL/L (ref 3.4–5.3)
PROT SERPL-MCNC: 8.2 G/DL (ref 6.8–8.8)
RBC # BLD AUTO: 3.81 10E12/L (ref 3.8–5.2)
RBC #/AREA URNS AUTO: 1 /HPF (ref 0–2)
SODIUM SERPL-SCNC: 139 MMOL/L (ref 133–144)
SP GR UR STRIP: 1 (ref 1–1.03)
URN SPEC COLLECT METH UR: ABNORMAL
UROBILINOGEN UR STRIP-MCNC: NORMAL MG/DL (ref 0–2)
WBC # BLD AUTO: 9.8 10E9/L (ref 4–11)
WBC #/AREA URNS AUTO: <1 /HPF (ref 0–2)

## 2017-08-13 PROCEDURE — 83605 ASSAY OF LACTIC ACID: CPT | Performed by: EMERGENCY MEDICINE

## 2017-08-13 PROCEDURE — 96376 TX/PRO/DX INJ SAME DRUG ADON: CPT | Performed by: EMERGENCY MEDICINE

## 2017-08-13 PROCEDURE — 25000128 H RX IP 250 OP 636: Performed by: EMERGENCY MEDICINE

## 2017-08-13 PROCEDURE — 96361 HYDRATE IV INFUSION ADD-ON: CPT | Performed by: EMERGENCY MEDICINE

## 2017-08-13 PROCEDURE — 96374 THER/PROPH/DIAG INJ IV PUSH: CPT | Performed by: EMERGENCY MEDICINE

## 2017-08-13 PROCEDURE — 81001 URINALYSIS AUTO W/SCOPE: CPT | Performed by: EMERGENCY MEDICINE

## 2017-08-13 PROCEDURE — 85025 COMPLETE CBC W/AUTO DIFF WBC: CPT | Performed by: EMERGENCY MEDICINE

## 2017-08-13 PROCEDURE — 99285 EMERGENCY DEPT VISIT HI MDM: CPT | Mod: 25 | Performed by: EMERGENCY MEDICINE

## 2017-08-13 PROCEDURE — 83690 ASSAY OF LIPASE: CPT | Performed by: EMERGENCY MEDICINE

## 2017-08-13 PROCEDURE — 80053 COMPREHEN METABOLIC PANEL: CPT | Performed by: EMERGENCY MEDICINE

## 2017-08-13 PROCEDURE — 25000132 ZZH RX MED GY IP 250 OP 250 PS 637: Performed by: EMERGENCY MEDICINE

## 2017-08-13 PROCEDURE — 99284 EMERGENCY DEPT VISIT MOD MDM: CPT | Mod: Z6 | Performed by: EMERGENCY MEDICINE

## 2017-08-13 PROCEDURE — 96375 TX/PRO/DX INJ NEW DRUG ADDON: CPT | Performed by: EMERGENCY MEDICINE

## 2017-08-13 PROCEDURE — 74176 CT ABD & PELVIS W/O CONTRAST: CPT

## 2017-08-13 RX ORDER — ONDANSETRON 2 MG/ML
4 INJECTION INTRAMUSCULAR; INTRAVENOUS ONCE
Status: COMPLETED | OUTPATIENT
Start: 2017-08-13 | End: 2017-08-13

## 2017-08-13 RX ORDER — LIDOCAINE 50 MG/G
1 PATCH TOPICAL ONCE
Status: DISCONTINUED | OUTPATIENT
Start: 2017-08-13 | End: 2017-08-13

## 2017-08-13 RX ORDER — LIDOCAINE 50 MG/G
1 PATCH TOPICAL ONCE
Status: COMPLETED | OUTPATIENT
Start: 2017-08-13 | End: 2017-08-13

## 2017-08-13 RX ORDER — LIDOCAINE 40 MG/G
CREAM TOPICAL
Status: DISCONTINUED | OUTPATIENT
Start: 2017-08-13 | End: 2017-08-13 | Stop reason: HOSPADM

## 2017-08-13 RX ORDER — ONDANSETRON 2 MG/ML
4 INJECTION INTRAMUSCULAR; INTRAVENOUS ONCE
Status: DISCONTINUED | OUTPATIENT
Start: 2017-08-13 | End: 2017-08-13 | Stop reason: HOSPADM

## 2017-08-13 RX ORDER — HYDROMORPHONE HYDROCHLORIDE 1 MG/ML
0.5 INJECTION, SOLUTION INTRAMUSCULAR; INTRAVENOUS; SUBCUTANEOUS ONCE
Status: COMPLETED | OUTPATIENT
Start: 2017-08-13 | End: 2017-08-13

## 2017-08-13 RX ORDER — LIDOCAINE 50 MG/G
PATCH TOPICAL
Qty: 30 PATCH | Refills: 1 | Status: SHIPPED | OUTPATIENT
Start: 2017-08-13 | End: 2017-09-06

## 2017-08-13 RX ADMIN — SODIUM CHLORIDE, POTASSIUM CHLORIDE, SODIUM LACTATE AND CALCIUM CHLORIDE 500 ML: 600; 310; 30; 20 INJECTION, SOLUTION INTRAVENOUS at 10:43

## 2017-08-13 RX ADMIN — ONDANSETRON 4 MG: 2 INJECTION INTRAMUSCULAR; INTRAVENOUS at 10:54

## 2017-08-13 RX ADMIN — HYDROMORPHONE HYDROCHLORIDE 0.5 MG: 1 INJECTION, SOLUTION INTRAMUSCULAR; INTRAVENOUS; SUBCUTANEOUS at 11:36

## 2017-08-13 RX ADMIN — HYDROMORPHONE HYDROCHLORIDE 0.5 MG: 1 INJECTION, SOLUTION INTRAMUSCULAR; INTRAVENOUS; SUBCUTANEOUS at 10:56

## 2017-08-13 RX ADMIN — SODIUM CHLORIDE, POTASSIUM CHLORIDE, SODIUM LACTATE AND CALCIUM CHLORIDE 500 ML: 600; 310; 30; 20 INJECTION, SOLUTION INTRAVENOUS at 10:51

## 2017-08-13 RX ADMIN — LIDOCAINE 1 PATCH: 50 PATCH CUTANEOUS at 13:06

## 2017-08-13 ASSESSMENT — ENCOUNTER SYMPTOMS
DYSURIA: 1
BLOOD IN STOOL: 1
SHORTNESS OF BREATH: 1
FEVER: 0
NAUSEA: 1
COUGH: 1
DIARRHEA: 1
VOMITING: 1
ABDOMINAL PAIN: 1
FLANK PAIN: 1

## 2017-08-13 NOTE — DISCHARGE INSTRUCTIONS
Thank you for coming to the [facility] Emergency Department.    Return to the ER with any concerns about new leg weakness, sensation changes, inability to empty your bladder or new incontinence of urine or stool.    Please follow up with your primary care provider if pain is continuing.     Begin planing 2 lidocaine patches on your back, one on each side. Leave these in place for 12 hours, then remove for 12 hours.

## 2017-08-13 NOTE — ED NOTES
Bed: ED08  Expected date: 8/13/17  Expected time: 9:42 AM  Means of arrival: Ambulance  Comments:  N [phone] Abd pain yellow

## 2017-08-13 NOTE — ED AVS SNAPSHOT
[facility], Emergency Department    [address] [address]    Phone:  [phone]                                       Sonya Foote   MRN: [id number]    Department:  [facility], Emergency Department   Date of Visit:  8/13/2017           Patient Information     Date Of Birth          1950        Your diagnoses for this visit were:     Acute left-sided low back pain without sciatica     Chronic pain syndrome     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 (H)        You were seen by Karla Carlisle MD.        Discharge Instructions       Thank you for coming to the [facility] Emergency Department.    Return to the ER with any concerns about new leg weakness, sensation changes, inability to empty your bladder or new incontinence of urine or stool.    Please follow up with your primary care provider if pain is continuing.     Begin planing 2 lidocaine patches on your back, one on each side. Leave these in place for 12 hours, then remove for 12 hours.     Future Appointments        Provider Department Dept Phone Center    8/14/2017 9:40 AM Vida Sanchez [facility] [phone]     8/28/2017 1:30 PM Alina Jennings MD [facility] [phone] [facility]    9/8/2017 10:00 AM VICTOR M Floyd [facility] Gastroenterology and [phone] [facility]    9/27/2017 11:15 AM Bj Anderson MD [facility] [place] [phone] [facility] PSA CLIN    10/18/2017 12:00 PM Elizabeth Oliver MD [facility] [place] [phone] [facility]    11/2/2017 9:15 AM Marely Robin MD [facility] [phone] [facility] MSA CLIN    11/10/2017 9:20 AM Michelle Irizarry MD [facility] Endocrinology [phone] [facility]      24 Hour Appointment Hotline       To make an appointment at any [facility], call [phone]  ([phone]). If you don't have a family doctor or clinic, we will help you find one. [facility] are conveniently located to serve the needs of you and your family.             Review of your medicines      CONTINUE these medicines which may have CHANGED, or have new prescriptions. If we are uncertain of the size of tablets/capsules you have at home, strength may be listed as something that might have changed.        Dose / Directions Last dose taken    lidocaine 5 % Patch   Commonly known as:  LIDODERM   What changed:  See the new instructions.   Quantity:  30 patch        APPLY 1 TO 3 PATCHES TO PAINFUL AREA AT ONCE FOR UP TO 12 HOURS WITHIN A 24 HOUR PERIOD REMOVE AFTER 12 HOURS   Refills:  1          Our records show that you are taking the medicines listed below. If these are incorrect, please call your family doctor or clinic.        Dose / Directions Last dose taken    ACETAMINOPHEN PO   Dose:  1000 mg        Take 1,000 mg by mouth every 6 hours as needed for fever Taking q4-6 hours, per MAR   Refills:  0        ADVAIR DISKUS 250-50 MCG/DOSE diskus inhaler   Dose:  1 puff   Generic drug:  fluticasone-salmeterol        Inhale 1 puff into the lungs 2 times daily   Refills:  0        albuterol (2.5 MG/3ML) 0.083% neb solution   Quantity:  360 mL        INHALE 1 VIAL VIA NEBULIZER EVERY 6 HOURS AS NEEDED   Refills:  11        aspirin 81 MG EC tablet   Dose:  81 mg   Quantity:  90 tablet        Take 1 tablet (81 mg) by mouth daily   Refills:  3        atorvastatin 40 MG tablet   Commonly known as:  LIPITOR   Dose:  40 mg   Quantity:  90 tablet        Take 1 tablet (40 mg) by mouth daily   Refills:  3        blood glucose monitoring meter device kit   Quantity:  1 kit        Use to test blood sugars 3 times daily or as directed.   Refills:  0        blood glucose monitoring test strip   Commonly known as:  ONE TOUCH ULTRA   Quantity:  3 Box        Use to test blood sugars 3 times daily or as directed.    Refills:  3        calcium citrate-vitamin D 315-250 MG-UNIT Tabs per tablet   Commonly known as:  CITRACAL   Dose:  2 tablet   Quantity:  120 tablet        Take 2 tablets by mouth daily   Refills:  5        cetirizine 10 MG tablet   Commonly known as:  zyrTEC   Dose:  10 mg   Quantity:  90 tablet        Take 1 tablet (10 mg) by mouth daily as needed for allergies   Refills:  3        clopidogrel 75 MG tablet   Commonly known as:  PLAVIX   Dose:  75 mg   Quantity:  90 tablet        Take 1 tablet (75 mg) by mouth daily   Refills:  3        CYANOCOBALAMIN PO   Dose:  2000 mcg        Take 2,000 mcg by mouth daily   Refills:  0        cyclobenzaprine 10 MG tablet   Commonly known as:  FLEXERIL   Dose:  5-10 mg   Quantity:  30 tablet        Take 0.5-1 tablets (5-10 mg) by mouth 3 times daily as needed for muscle spasms   Refills:  1        diclofenac 1 % Gel topical gel   Commonly known as:  VOLTAREN   Dose:  2 g   Quantity:  100 g        Apply 2 g topically 4 times daily to hands   Refills:  11        dorzolamide 2 % ophthalmic solution   Commonly known as:  TRUSOPT   Dose:  1 drop        Place 1 drop into both eyes 2 times daily   Refills:  0        * EASY TOUCH LANCETS 30G/TWIST Misc        Refills:  0        * thin lancets   Commonly known as:  NO BRAND SPECIFIED   Quantity:  1 Box        Use to test blood sugar 3 times daily or as directed.   Refills:  10        * blood glucose monitoring lancets   Quantity:  3 Box        Use to test blood sugar 3 times daily or as directed.   Refills:  3        EPINEPHrine 0.15 MG/0.3ML injection 2-pack   Commonly known as:  EPIPEN JR   Dose:  0.15 mg   Quantity:  0.6 mL        Inject 0.3 mLs (0.15 mg) into the muscle as needed for anaphylaxis   Refills:  1        ESCITALOPRAM OXALATE PO   Dose:  10 mg        Take 10 mg by mouth daily   Refills:  0        estradiol 0.1 MG/GM cream   Commonly known as:  ESTRACE VAGINAL   Quantity:  42.5 g        Use dime size amount 3x a week at  night   Refills:  3        estradiol 1 MG tablet   Commonly known as:  ESTRACE   Dose:  1 mg   Quantity:  90 tablet        Take 1 tablet (1 mg) by mouth daily   Refills:  3        ferrous sulfate 325 (65 FE) MG tablet   Commonly known as:  IRON   Dose:  325 mg   Quantity:  60 tablet        Take 1 tablet (325 mg) by mouth 2 times daily With meals   Refills:  2        fluticasone 50 MCG/ACT spray   Commonly known as:  FLONASE   Dose:  1 spray        Spray 1 spray into both nostrils daily   Refills:  0        hydrochlorothiazide 12.5 MG capsule   Commonly known as:  MICROZIDE   Dose:  12.5 mg   Quantity:  90 capsule        Take 1 capsule (12.5 mg) by mouth daily   Refills:  3        HYDROmorphone 2 MG tablet   Commonly known as:  DILAUDID   Dose:  2 mg   Quantity:  30 tablet        Take 1 tablet (2 mg) by mouth every 3 hours as needed for moderate to severe pain   Refills:  0        RENÉE Pack   Dose:  1 packet        Take 1 packet by mouth 2 times daily   Refills:  0        latanoprost 0.005 % ophthalmic solution   Commonly known as:  XALATAN   Dose:  1 drop   Quantity:  2.5 mL        Place 1 drop into both eyes At Bedtime   Refills:  11        levETIRAcetam 500 MG tablet   Commonly known as:  KEPPRA   Dose:  500 mg   Quantity:  180 tablet        Take 1 tablet (500 mg) by mouth 2 times daily   Refills:  1        levofloxacin 500 MG tablet   Commonly known as:  LEVAQUIN   Dose:  500 mg   Quantity:  7 tablet        Take 1 tablet (500 mg) by mouth daily   Refills:  0        lisinopril 10 MG tablet   Commonly known as:  PRINIVIL/ZESTRIL   Dose:  10 mg   Quantity:  90 tablet        Take 1 tablet (10 mg) by mouth daily   Refills:  3        MEDICATION GIVEN BY INTRATHECAL PUMP - INSTRUCTION        continuous May 19, 2017 - per [facility] in [place] [phone]: Conc: Bupivacaine 20 mg/mL and Fentanyl 2000 mcg/mL. Continuous: Bupivacaine 7.265 mg/day and Fentanyl 726.5 mcg/day. Boluses: Up to 7  boluses per 24-hr period of Bupivicaine 0.599 mg and Fentanyl 59.9 mcg  Pump Refill Date at [facility]: 7/2/17   Refills:  0        metoprolol 25 MG 24 hr tablet   Commonly known as:  TOPROL-XL   Quantity:  30 tablet        TAKE 1 TABLET (25 MG) BY MOUTH DAILY   Refills:  10        MULTIVITAMIN PO   Dose:  1 tablet        Take 1 tablet by mouth daily   Refills:  0        nitroGLYcerin 0.4 MG sublingual tablet   Commonly known as:  NITROSTAT   Dose:  0.4 mg   Quantity:  25 tablet        Place 1 tablet (0.4 mg) under the tongue every 5 minutes as needed for chest pain if you are still having symptoms after 3 doses (15 minutes) call 911.   Refills:  1        ondansetron 4 MG ODT tab   Commonly known as:  ZOFRAN-ODT   Dose:  4 mg   Quantity:  120 tablet        Take 1 tablet (4 mg) by mouth every 6 hours   Refills:  0        * order for DME   Quantity:  1 Month        Equipment being ordered: Depends briefs   Refills:  11        * order for DME   Quantity:  1 Device        Equipment being ordered: Power Wheelchair   Refills:  0        * order for DME   Quantity:  1 Units        Equipment being ordered: Nebulizer and supplies   Refills:  0        * order for DME   Quantity:  1 Box        Equipment being ordered: Glucerna daily shakes with each meal   Refills:  11        * order for DME   Quantity:  1 Device        ACcu check BID   Refills:  0        * order for DME   Quantity:  1 Units        Equipment being ordered: Nebulizer and tubing supplies   Refills:  0        * order for DME   Quantity:  1 Device        Equipment being ordered: CPAP supplies mask, hose, filters, cushion fax to [facility] at [phone] Disp: 10 DeviceRefills: prn Class: Local PrintStart: 2/10/2017   Refills:  0        * order for DME   Quantity:  10 Device        Equipment being ordered: CPAP supplies mask, hose, filters, cushion  fax to [facility] at [phone]   Refills:  prn        * order for DME   Quantity:  1 Device         "Equipment being ordered: wheelchair seat cushion   Refills:  0        * order for DME   Quantity:  1 Device        roho w/c cushion 18\" X 18\" for pressure relief   Refills:  0        * order for DME   Quantity:  1 Device        Hospital bed with side rails   Refills:  0        * order for DME   Quantity:  1 Device        Full electric hospital bed with half rails  Dx: U26478, I110, J449 Length of need: lifetime   Refills:  0        * order for DME   Quantity:  1 Device        Wheel Chair Cushion: 18 x 18 inch Roho cushion   Refills:  0        pantoprazole 40 MG EC tablet   Commonly known as:  PROTONIX   Dose:  40 mg        Take 40 mg by mouth daily   Refills:  0        Phenytoin Sodium Extended 200 MG Caps   Dose:  200 mg   Quantity:  60 capsule        Take 200 mg by mouth 2 times daily   Refills:  0        polyethylene glycol Packet   Commonly known as:  MIRALAX/GLYCOLAX   Dose:  17 g        Take 17 g by mouth daily Dissolved in water or juice   Refills:  0        pregabalin 75 MG capsule   Commonly known as:  LYRICA   Dose:  150 mg   Quantity:  120 capsule        Take 2 capsules (150 mg) by mouth 2 times daily   Refills:  5        prochlorperazine 10 MG tablet   Commonly known as:  COMPAZINE   Dose:  10 mg   Quantity:  20 tablet        Take 1 tablet (10 mg) by mouth every 8 hours as needed   Refills:  0        risperiDONE 0.5 MG tablet   Commonly known as:  risperDAL   Dose:  0.5 mg        Take 0.5 mg by mouth At Bedtime   Refills:  0        sucralfate 1 GM tablet   Commonly known as:  CARAFATE   Dose:  1 g   Quantity:  40 tablet        Take 1 tablet (1 g) by mouth 4 times daily May dissolve in 10 mL water is necessary. (Start upon completion of carafate suspension)   Refills:  5        traZODone 100 MG tablet   Commonly known as:  DESYREL   Dose:  150 mg   Quantity:  90 tablet        Take 1.5 tablets (150 mg) by mouth At Bedtime   Refills:  3        umeclidinium 62.5 MCG/INH oral inhaler   Commonly known as:  " INCRUSE ELLIPTA   Dose:  1 puff   Quantity:  3 Inhaler        Inhale 1 puff into the lungs daily   Refills:  3        vitamin D 2000 UNITS tablet   Dose:  2000 Units   Quantity:  100 tablet        Take 2,000 Units by mouth daily.   Refills:  3        zinc 50 MG Tabs   Dose:  1 tablet        Take 1 tablet by mouth daily   Refills:  0        * Notice:  This list has 16 medication(s) that are the same as other medications prescribed for you. Read the directions carefully, and ask your doctor or other care provider to review them with you.            Prescriptions were sent or printed at these locations (1 Prescription)                   Other Prescriptions                Printed at Department/Unit printer (1 of 1)         lidocaine (LIDODERM) 5 % Patch                Procedures and tests performed during your visit     Abd/pelvis CT no contrast - Stone Protocol    CBC with platelets differential    Comprehensive metabolic panel    Lactic acid whole blood    Lipase    Peripheral IV catheter    Straight cath for urine    UA with Microscopic reflex to Culture      Orders Needing Specimen Collection     None      Pending Results     No orders found from 8/11/2017 to 8/14/2017.            Pending Culture Results     No orders found from 8/11/2017 to 8/14/2017.            Pending Results Instructions     If you had any lab results that were not finalized at the time of your Discharge, you can call the ED Lab Result RN at [phone]. You will be contacted by this team for any positive Lab results or changes in treatment. The nurses are available 7 days a week from 10A to 6:30P.  You can leave a message 24 hours per day and they will return your call.        Thank you for choosing [facility]       Thank you for choosing [facility] for your care. Our goal is always to provide you with excellent care. Hearing back from our patients is one way we can continue to improve our services. Please take a few minutes to complete the  "written survey that you may receive in the mail after you visit with us. Thank you!        [employer] Information     [employer] lets you send messages to your doctor, view your test results, renew your prescriptions, schedule appointments and more. To sign up, go to [email or web address] . Click on \"Log in\" on the left side of the screen, which will take you to the Welcome page. Then click on \"Sign up Now\" on the right side of the page.     You will be asked to enter the access code listed below, as well as some personal information. Please follow the directions to create your username and password.     Your access code is: [email or web address]  Expires: 10/17/2017  1:21 PM     Your access code will  in 90 days. If you need help or a new code, please call your [facility] clinic or [phone].        Care EveryWhere ID     This is your Care EveryWhere ID. This could be used by other organizations to access your [facility] medical records  [id number]        Equal Access to Services     IRAJ CARREON : Hadii shaheen bowers Sojoe, waaxda luqadaha, qaybta kaalmawei encarnacion, tonie marvin . So [facility] [phone].    ATENCIÓN: Si habla español, tiene a briones disposición servicios gratuitos de asistencia lingüística. Llame al [phone].    We comply with applicable federal civil rights laws and [place] laws. We do not discriminate on the basis of race, color, national origin, age, disability sex, sexual orientation or gender identity.            After Visit Summary       This is your record. Keep this with you and show to your community pharmacist(s) and doctor(s) at your next visit.                  "

## 2017-08-13 NOTE — ED PROVIDER NOTES
History     Chief Complaint   Patient presents with     Abdominal Pain     LUQ     HPI  Sonya Foote is a 67 year old female with a history of CAD, s/p inferior MI with COLLEEN to pRCA & mRCA 9/17/2016, HTN, HLD, PVD, s/p bilateral AKA,  h/o LLE blood clot prior to amputation, sickle cell trait, h/o anemia, COPD, asthma, GERD, dysphagia with history of esophageal stricture, s/p dilation, NIDDM, neuropathy, chronic low back pain with intrathecal pump, seizure disorder, h/o CVA, s/p stent to left carotid,  depression/anxiety, schizoaffective disorder who presents to the Emergency Department via EMS for evaluation of left sided abdominal/back pain. Patient complains of severe left lower abdominal and left flank pain ongoing for the past 1 week and worsening yesterday into today. She also complains of nausea and vomiting as well as a cough and some shortness of breath. She notes that she had a bowel movement 3 days ago and noticed some blood with this; she also had diarrhea yesterday. She notes some dysuria, she had at UTI and was treated (10 days) for this, but the dysuria has been persistent. She denies fevers. She adds that she has noticed that her blood pressure has been higher lately.     Past Medical History:   Diagnosis Date     Anemia      Antiplatelet or antithrombotic long-term use      Arthritis      AS (sickle cell trait) (H) 10/8/2013     Blind left eye      BPPV (benign paroxysmal positional vertigo)      Chronic back pain      COPD (chronic obstructive pulmonary disease) (H)      Coronary artery disease      CVA (cerebral infarction) 10/8/2012    x3, residual left sided weakness     Diastolic CHF (H) 9/4/2012     Dilantin toxicity 5/31/2013     Esophageal candidiasis (H)      GERD (gastroesophageal reflux disease)      Heart attack (H)      Hernia, abdominal      History of blood transfusion     x5     History of thrombophlebitis      HTN (hypertension) 9/4/2012     Hyperlipidemia LDL goal <100 9/4/2012      Legally blind in right eye, as defined in [place]     No periphal vision right eye     Osteoporosis 1/21/2013     Other chronic pain      PAD (peripheral artery disease) (H)      Palpitations      Person who has had sex change operation 1/20/2014     Pneumonia      Schizoaffective disorder (H)      Seizure disorder (H) 9/4/2012     Seizures (H)      Sleep apnea     CPAP     Thrombosis of leg      Type 2 diabetes, HbA1C goal < 8% (H) 9/4/2012     Uncomplicated asthma      Unspecified cerebral artery occlusion with cerebral infarction        Past Surgical History:   Procedure Laterality Date     AMPUTATE LEG ABOVE KNEE Left 6/11/2016    Procedure: AMPUTATE LEG ABOVE KNEE;  Surgeon: Mello Rodriguez MD;  [facility]     AMPUTATE LEG BELOW KNEE Right 11/7/2016    Procedure: AMPUTATE LEG BELOW KNEE;  Surgeon: Savannah Durant MD;  [facility]     AMPUTATE REVISION STUMP LOWER EXTREMITY Right 11/11/2016    Procedure: AMPUTATE REVISION STUMP LOWER EXTREMITY;  Surgeon: Savannah Durant MD;  [facility]     AMPUTATE REVISION STUMP LOWER EXTREMITY Right 11/16/2016    Procedure: AMPUTATE REVISION STUMP LOWER EXTREMITY;  Surgeon: Savannah Durant MD;  [facility]     AMPUTATE TOE(S) Right 1/5/2016    Procedure: AMPUTATE TOE(S);  Surgeon: Mello Gaines DPM;  Location:  [facility]     ANGIOGRAM Bilateral 11/21/2014    Procedure: ANGIOGRAM;  Surgeon: Savannah Durant MD;  [facility]     ANGIOGRAM Left 1/16/2015    Procedure: ANGIOGRAM;  Surgeon: Savannah Durant MD;  [facility]     ANGIOGRAM Bilateral 9/14/2015    Procedure: ANGIOGRAM;  Surgeon: Savannah Durant MD;  [facility]     ANGIOGRAM Left 10/12/2015    Procedure: ANGIOGRAM;  Surgeon: Savannah Durant MD;  [facility]     ANGIOGRAM Right 6/6/2016    Procedure: ANGIOGRAM;  Surgeon: Savannah Durant MD;  [facility]     ANGIOPLASTY Right 6/6/2016    Procedure: ANGIOPLASTY;  Surgeon: Savannah Durant MD;  [facility]     APPENDECTOMY       BREAST SURGERY       right breast bx (benign)     CHOLECYSTECTOMY       COLONOSCOPY N/A 8/25/2014    Procedure: COLONOSCOPY;  Surgeon: Mello Ferrer MD;  [facility]     COLONOSCOPY WITH CO2 INSUFFLATION N/A 8/20/2014    Procedure: COLONOSCOPY WITH CO2 INSUFFLATION;  Surgeon: Duane, William Charles, MD;  [facility]: [facility]     ENDARTERECTOMY FEMORAL  5/23/2014    Procedure: ENDARTERECTOMY FEMORAL;  Surgeon: Jason Joshi MD;  [facility]     ESOPHAGOSCOPY, GASTROSCOPY, DUODENOSCOPY (EGD), COMBINED  12/14/2012    Procedure: COMBINED ESOPHAGOSCOPY, GASTROSCOPY, DUODENOSCOPY (EGD), BIOPSY SINGLE OR MULTIPLE;  ESOPHAGOSCOPY, GASTROSCOPY, DUODENOSCOPY (EGD), DILATATION ;  Surgeon: Elizabeth Stevenson MD;  Location:  GI     ESOPHAGOSCOPY, GASTROSCOPY, DUODENOSCOPY (EGD), COMBINED  12/31/2013    Procedure: COMBINED ESOPHAGOSCOPY, GASTROSCOPY, DUODENOSCOPY (EGD), BIOPSY SINGLE OR MULTIPLE;;  Surgeon: Clemente Lopez MD;  [facility]     ESOPHAGOSCOPY, GASTROSCOPY, DUODENOSCOPY (EGD), COMBINED  4/1/2014    Procedure: COMBINED ESOPHAGOSCOPY, GASTROSCOPY, DUODENOSCOPY (EGD);;  Surgeon: Clemente Lopez MD;  [facility]     ESOPHAGOSCOPY, GASTROSCOPY, DUODENOSCOPY (EGD), COMBINED  6/28/2014    Procedure: COMBINED ESOPHAGOSCOPY, GASTROSCOPY, DUODENOSCOPY (EGD);  Surgeon: Clemente Lopez MD;  [facility]     ESOPHAGOSCOPY, GASTROSCOPY, DUODENOSCOPY (EGD), COMBINED N/A 8/20/2014    Procedure: COMBINED ESOPHAGOSCOPY, GASTROSCOPY, DUODENOSCOPY (EGD), BIOPSY SINGLE OR MULTIPLE;  Surgeon: Duane, William Charles, MD;  [facility]     ESOPHAGOSCOPY, GASTROSCOPY, DUODENOSCOPY (EGD), COMBINED N/A 8/22/2014    Procedure: COMBINED ESOPHAGOSCOPY, GASTROSCOPY, DUODENOSCOPY (EGD), BIOPSY SINGLE OR MULTIPLE;  Surgeon: Mello Ferrer MD;  [facility]     ESOPHAGOSCOPY, GASTROSCOPY, DUODENOSCOPY (EGD), COMBINED N/A 10/2/2014    Procedure: COMBINED ESOPHAGOSCOPY, GASTROSCOPY, DUODENOSCOPY (EGD), BIOPSY SINGLE OR MULTIPLE;   Surgeon: Remy Haskins MD;  [facility]     ESOPHAGOSCOPY, GASTROSCOPY, DUODENOSCOPY (EGD), COMBINED Left 12/15/2014    Procedure: COMBINED ESOPHAGOSCOPY, GASTROSCOPY, DUODENOSCOPY (EGD), BIOPSY SINGLE OR MULTIPLE;  Surgeon: Remy Haskins MD;  [facility]     ESOPHAGOSCOPY, GASTROSCOPY, DUODENOSCOPY (EGD), COMBINED N/A 2/25/2015    Procedure: COMBINED ENDOSCOPIC ULTRASOUND, ESOPHAGOSCOPY, GASTROSCOPY, DUODENOSCOPY (EGD), FINE NEEDLE ASPIRATE/BIOPSY;  Surgeon: Clemente Lugo MD;  [facility]     ESOPHAGOSCOPY, GASTROSCOPY, DUODENOSCOPY (EGD), COMBINED Left 2/25/2015    Procedure: COMBINED ESOPHAGOSCOPY, GASTROSCOPY, DUODENOSCOPY (EGD), BIOPSY SINGLE OR MULTIPLE;  Surgeon: Clemente Lugo MD;  [facility]     ESOPHAGOSCOPY, GASTROSCOPY, DUODENOSCOPY (EGD), COMBINED N/A 9/25/2016    Procedure: COMBINED ESOPHAGOSCOPY, GASTROSCOPY, DUODENOSCOPY (EGD);  Surgeon: Aziza Patiño MD;  [facility]     ESOPHAGOSCOPY, GASTROSCOPY, DUODENOSCOPY (EGD), COMBINED N/A 1/18/2017    Procedure: COMBINED ESOPHAGOSCOPY, GASTROSCOPY, DUODENOSCOPY (EGD), BIOPSY SINGLE OR MULTIPLE;  Surgeon: Clemente Lopez MD;  [facility]     FASCIOTOMY LOWER EXTREMITY Left 6/10/2016    Procedure: FASCIOTOMY LOWER EXTREMITY;  Surgeon: Mello Rodriguez MD;  [facility]     HC CAPSULE ENDOSCOPY N/A 8/25/2014    Procedure: CAPSULE/PILL CAM ENDOSCOPY;  Surgeon: Remy Haskins MD;  [facility]     HC CAPSULE ENDOSCOPY N/A 10/2/2014    Procedure: CAPSULE/PILL CAM ENDOSCOPY;  Surgeon: Remy Haskins MD;  [facility]     ORTHOPEDIC SURGERY      broken wrist repair     SEX TRANSFORMATION SURGERY, MALE TO FEMALE      1974     SINUS SURGERY      cyst removed     TONSILLECTOMY       VASCULAR SURGERY      Left carotid stent       Family History   Problem Relation Age of Onset     Dementia Mother      Glaucoma Mother      DIABETES Mother      Coronary Artery Disease Mother      MI      Glaucoma Father      DIABETES Father      Heart Failure Father      CANCER Maternal Aunt      leukemia     Schizophrenia Brother      Depression Brother      Suicide Sister      Depression Sister      DIABETES Sister      CANCER Maternal Aunt      ovarian     Glaucoma Maternal Grandmother      DIABETES Maternal Grandmother      Glaucoma Maternal Grandfather      DIABETES Maternal Grandfather      Glaucoma Paternal Grandmother      DIABETES Paternal Grandmother      Glaucoma Paternal Grandfather      DIABETES Paternal Grandfather      Breast Cancer Sister      CEREBROVASCULAR DISEASE Brother        Social History   Substance Use Topics     Smoking status: Former Smoker     Packs/day: 2.50     Years: 30.00     Types: Cigarettes, Cigars     Quit date: 11/1/2000     Smokeless tobacco: Never Used     Alcohol use No       Current Facility-Administered Medications   Medication     lidocaine 1 % 1 mL     lidocaine (LMX4) kit     sodium chloride (PF) 0.9% PF flush 3 mL     sodium chloride (PF) 0.9% PF flush 3 mL     ondansetron (ZOFRAN) injection 4 mg     lidocaine (LIDODERM) 5 % Patch 1 patch     lidocaine (LIDODERM) patch REMOVAL     lidocaine (LIDODERM) patch in PLACE     Current Outpatient Prescriptions   Medication     lidocaine (LIDODERM) 5 % Patch     levofloxacin (LEVAQUIN) 500 MG tablet     estradiol (ESTRACE VAGINAL) 0.1 MG/GM cream     metoprolol (TOPROL-XL) 25 MG 24 hr tablet     traZODone (DESYREL) 100 MG tablet     order for DME     order for DME     cyclobenzaprine (FLEXERIL) 10 MG tablet     umeclidinium (INCRUSE ELLIPTA) 62.5 MCG/INH oral inhaler     order for DME     order for DME     polyethylene glycol (MIRALAX/GLYCOLAX) Packet     ESCITALOPRAM OXALATE PO     ACETAMINOPHEN PO     pregabalin (LYRICA) 75 MG capsule     cetirizine (ZYRTEC) 10 MG tablet     diclofenac (VOLTAREN) 1 % GEL topical gel     EPINEPHrine (EPIPEN JR) 0.15 MG/0.3ML injection     lisinopril (PRINIVIL/ZESTRIL) 10 MG tablet      pantoprazole (PROTONIX) 40 MG EC tablet     risperiDONE (RISPERDAL) 0.5 MG tablet     ferrous sulfate (IRON) 325 (65 FE) MG tablet     blood glucose monitoring (ONE TOUCH ULTRA) test strip     order for DME     order for DME     sucralfate (CARAFATE) 1 GM tablet     order for DME     order for DME     albuterol (2.5 MG/3ML) 0.083% neb solution     order for DME     ondansetron (ZOFRAN-ODT) 4 MG ODT tab     CYANOCOBALAMIN PO     Nutritional Supplements (RENÉE) PACK     HYDROmorphone (DILAUDID) 2 MG tablet     fluticasone (FLONASE) 50 MCG/ACT nasal spray     ADVAIR DISKUS 250-50 MCG/DOSE diskus inhaler     calcium citrate-vitamin D (CITRACAL) 315-250 MG-UNIT TABS     hydrochlorothiazide (MICROZIDE) 12.5 MG capsule     estradiol (ESTRACE) 1 MG tablet     levETIRAcetam (KEPPRA) 500 MG tablet     aspirin EC 81 MG EC tablet     clopidogrel (PLAVIX) 75 MG tablet     blood glucose monitoring (ONE TOUCH ULTRA 2) meter device kit     blood glucose monitoring (ONE TOUCH ULTRASOFT) lancets     phenytoin 200 MG CAPS     dorzolamide (TRUSOPT) 2 % ophthalmic solution     zinc 50 MG TABS     nitroglycerin (NITROSTAT) 0.4 MG SL tablet     MEDICATION GIVEN BY INTRATHECAL PUMP - INSTRUCTION     latanoprost (XALATAN) 0.005 % ophthalmic solution     atorvastatin (LIPITOR) 40 MG tablet     order for DME     prochlorperazine (COMPAZINE) 10 MG tablet     thin (NO BRAND SPECIFIED) lancets     ORDER FOR DME     ORDER FOR DME     ORDER FOR DME     EASY TOUCH LANCETS 30G/TWIST MISC     Cholecalciferol (VITAMIN D) 2000 UNITS tablet     Multiple Vitamin (MULTIVITAMIN OR)     Facility-Administered Medications Ordered in Other Encounters   Medication     neostigmine (PROSTIGMINE) injection     glycopyrrolate (ROBINUL) injection        Allergies   Allergen Reactions     Bee Venom      Penicillins Anaphylaxis     Other reaction(s): Skin Rash and/or Hives     Dilantin [Phenytoin] Other (See Comments)     Generic dilantin only per pt     Iodide Hives  "    09/11/15: Pt states she can use iodine and doesn't have any problems      Iodine-131      Novocaine [Procaine] Hives     Other reaction(s): Skin Rash and/or Hives       I have reviewed the Medications, Allergies, Past Medical and Surgical History, and Social History in the Epic system.    Review of Systems   Constitutional: Negative for fever.   Respiratory: Positive for cough and shortness of breath.    Gastrointestinal: Positive for abdominal pain (left), blood in stool, diarrhea, nausea and vomiting.   Genitourinary: Positive for dysuria (left) and flank pain.       Physical Exam   BP: 166/85  Pulse: 66  Temp: 98.1  F (36.7  C)  Resp: 16  Height: 170.2 cm (5' 7\")  Weight: 60.3 kg (133 lb)  SpO2: 100 %    Physical Exam  Gen: A&Ox3, mildly uncomfortable.   HEENT: PERRL, no facial tenderness or wounds, head atraumatic, oropharynx clear, mucous membranes moist, TMs clear bilaterally  Neck:no bony tenderness or step offs, no JVD, trachea midline  Back: left perispinal and CVA tenderness, no midline bony tenderness  CV:RRR without murmurs  PULM:Clear to auscultation bilaterally. Normal RR, work of breathing and speech.   Abd:soft, non-tender to palpation, nondistended. Bowel sounds present and normal  UE:No traumatic injuries, skin normal, + radial pulses bilaterally with normal perfusion.   Rectal: no masses or hemorrhoids noted. Stool guiac negative.   LE: s/p bilateral AKA   Neuro:CN II-XII intact, strength 5/5 throughout  Skin: no rashes or ecchymoses      ED Course   9:59 AM  The patient was seen and examined by Karla Carlisle MD in Room 8.     ED Course     Procedures             Critical Care time:  none               Labs Ordered and Resulted from Time of ED Arrival Up to the Time of Departure from the ED   CBC WITH PLATELETS DIFFERENTIAL - Abnormal; Notable for the following:        Result Value    Hemoglobin 10.8 (*)     Hematocrit 32.9 (*)     All other components within normal limits   COMPREHENSIVE " METABOLIC PANEL - Abnormal; Notable for the following:     Creatinine 0.49 (*)     Albumin 2.7 (*)     Alkaline Phosphatase 221 (*)     All other components within normal limits   ROUTINE UA WITH MICROSCOPIC REFLEX TO CULTURE - Abnormal; Notable for the following:     Specific Gravity Urine 1.002 (*)     pH Urine 7.5 (*)     Bacteria Urine Few (*)     All other components within normal limits   LIPASE   LACTIC ACID WHOLE BLOOD   PERIPHERAL IV CATHETER   STRAIGHT CATH FOR URINE            Assessments & Plan (with Medical Decision Making)   68 yo F with subacute onset of left back/flank/lower abdominal discomfort. Hx of chronic back pain treated with lidocaine patches, flexeril, Lyrica and dilaudid.   DDx includes exacerbation of musculoskeletal pain, kidney stone, pyelonephritis, AAA, partial small bowel obstruction and ischemic colitis/enteritis.   IV access obtained and lab testing done.   CBC with WBC of 9.8, stable anemia with hgb 10.8, ptles 371.  CMP without acute abnormalities. Cr 0.49 and electrolytes wnl.   Lipase 129.  Lactic acid 1.4  Straight cath UA negative for UTI or hematuria.  CT A/P showing diverticulosis without diverticulitis. No other acute findings.     Symptom management with IV zofran, 500mL LR, IV dilaudid 0.5mg x2. Additional lidoderm patch applied to painful area. Pt currently prescribed 1-3 patches q24 hours but only has one patch on the low back that is not centered at the area of left back pain. Continue usual care for musculoskeletal pain. Follow up with primary care. Discharged back to care facility.     I have reviewed the nursing notes.    I have reviewed the findings, diagnosis, plan and need for follow up with the patient.    New Prescriptions    No medications on file       Final diagnoses:   Acute left-sided low back pain without sciatica   Lidia MONREAL, am serving as a trained medical scribe to document services personally performed by Karla Carlisle MD, based on the  provider's statements to me.      I, Karla Carlisle MD, was physically present and have reviewed and verified the accuracy of this note documented by Lidia Valencia.       8/13/2017   [facility], [place], EMERGENCY DEPARTMENT    MD NEELIMA Paiz Katrina Anne, MD  08/18/17 0961

## 2017-08-13 NOTE — ED NOTES
Coming in with LUQ pain. Pain started the beging of the week and has become worse. Last BM today. +N/V early morning.

## 2017-08-13 NOTE — ED AVS SNAPSHOT
[facility], [place], Emergency Department    [address] [place] [id number]    Phone:  [phone]                                       Sonya Foote   MRN: [id number]    Department:  [facility], Emergency Department   Date of Visit:  8/13/2017           After Visit Summary Signature Page     I have received my discharge instructions, and my questions have been answered. I have discussed any challenges I see with this plan with the nurse or doctor.    ..........................................................................................................................................  Patient/Patient Representative Signature      ..........................................................................................................................................  Patient Representative Print Name and Relationship to Patient    ..................................................               ................................................  Date                                            Time    ..........................................................................................................................................  Reviewed by Signature/Title    ...................................................              ..............................................  Date                                                            Time

## 2017-08-14 ENCOUNTER — CARE COORDINATION (OUTPATIENT)
Dept: GERIATRIC MEDICINE | Facility: CLINIC | Age: 67
End: 2017-08-14

## 2017-08-14 NOTE — PROGRESS NOTES
8/14/17: Received call from member stating that the amount of incontinence supplies that she is getting from [facility] (150 total) is not enough.  She had to purchase more on her own this past week.  She states she spoke with Maria Eugenia at the [facility] who switched her from [employer] to [facility] and Maria Eugenia states that CM has to complete.   ELVIRA spoke with Kiley at [facility]  - she states max is 150 - more can be covered if under EW.   ELVIRA spoke with member - she wishes to go back with [employer].  ELVIRA spoke with Iman at [facility] - she states that member can get the 200 pull ups and 120 diapers under insurance.  Iman will place back on monthly auto ship order.   ELVIRA spoke with member and Kiley at [facility] - she will cancel orders.  Member will inform Maria Eugenia at [facility].     Jamie Sharma RN, N  [facility]

## 2017-08-14 NOTE — PROGRESS NOTES
8/14/17: Per member - has not received foldable frame yet at this time.     Jamie Sharma RN, PHN  [employer]

## 2017-08-14 NOTE — PROGRESS NOTES
8/10/17: Per [facility] scheduling delivery.     Jamie Sharma RN, N  [facility]

## 2017-08-14 NOTE — PROGRESS NOTES
8/14/17: CM received notification of member ED visit on 8/13/17 for left sided pain.   Member was d/c back home.  CM made phone call to member  - she states she had a muscle spasm on left side - she wasn't sure what it was as has never had pain this bad.  She states she received medication and is doing better.  She has f/u appt this morning.     Jamie Sharma RN, PHN  [employer]

## 2017-08-16 ENCOUNTER — CARE COORDINATION (OUTPATIENT)
Dept: GERIATRIC MEDICINE | Facility: CLINIC | Age: 67
End: 2017-08-16

## 2017-08-16 NOTE — PROGRESS NOTES
Received confirmation from [employer] that her foldable toilet safety frame was delivered 8/10/17    Irene Lemosatoyoselyn  Case Management Specialist   [facility]   [phone]

## 2017-08-16 NOTE — PROGRESS NOTES
8/16/17: Foldable toilet safety frame delivered by [facility].     Jamie Sharma RN, PHN  [facility]

## 2017-08-23 ENCOUNTER — CARE COORDINATION (OUTPATIENT)
Dept: GERIATRIC MEDICINE | Facility: CLINIC | Age: 67
End: 2017-08-23

## 2017-08-23 NOTE — PROGRESS NOTES
CM f/u with member regarding hold on [employer] mobility passes - member states balance on card remains franko - no need for additional funds at this time.   Member will f/u with CM when more funds are needed.     Jamie Sharma RN, PHN  [facility]

## 2017-08-24 ENCOUNTER — OFFICE VISIT (OUTPATIENT)
Dept: INTERNAL MEDICINE | Facility: CLINIC | Age: 67
End: 2017-08-24
Payer: COMMERCIAL

## 2017-08-24 VITALS
HEART RATE: 96 BPM | DIASTOLIC BLOOD PRESSURE: 70 MMHG | OXYGEN SATURATION: 88 % | SYSTOLIC BLOOD PRESSURE: 116 MMHG | WEIGHT: 133 LBS | TEMPERATURE: 98.4 F | BODY MASS INDEX: 20.83 KG/M2

## 2017-08-24 DIAGNOSIS — E11.51 TYPE 2 DIABETES MELLITUS WITH DIABETIC PERIPHERAL ANGIOPATHY WITHOUT GANGRENE, WITHOUT LONG-TERM CURRENT USE OF INSULIN (H): ICD-10-CM

## 2017-08-24 DIAGNOSIS — Z11.59 NEED FOR HEPATITIS C SCREENING TEST: ICD-10-CM

## 2017-08-24 DIAGNOSIS — R07.89 CHEST WALL PAIN: Primary | ICD-10-CM

## 2017-08-24 LAB — HBA1C MFR BLD: 4.5 % (ref 4.3–6)

## 2017-08-24 PROCEDURE — 36415 COLL VENOUS BLD VENIPUNCTURE: CPT | Performed by: INTERNAL MEDICINE

## 2017-08-24 PROCEDURE — 86803 HEPATITIS C AB TEST: CPT | Performed by: INTERNAL MEDICINE

## 2017-08-24 PROCEDURE — 83036 HEMOGLOBIN GLYCOSYLATED A1C: CPT | Performed by: INTERNAL MEDICINE

## 2017-08-24 PROCEDURE — 99214 OFFICE O/P EST MOD 30 MIN: CPT | Performed by: INTERNAL MEDICINE

## 2017-08-24 RX ORDER — LANCETS
EACH MISCELLANEOUS
Qty: 100 EACH | Status: SHIPPED | OUTPATIENT
Start: 2017-08-24

## 2017-08-24 NOTE — NURSING NOTE
"Chief Complaint   Patient presents with     ER F/U       Initial /70  Pulse 96  Temp 98.4  F (36.9  C) (Oral)  Wt 133 lb (60.3 kg)  SpO2 (!) 88%  BMI 20.83 kg/m2 Estimated body mass index is 20.83 kg/(m^2) as calculated from the following:    Height as of 8/13/17: 5' 7\" (1.702 m).    Weight as of this encounter: 133 lb (60.3 kg).  Medication Reconciliation: complete   Daniela Hancock CMA      "

## 2017-08-24 NOTE — MR AVS SNAPSHOT
After Visit Summary   8/24/2017    Sonya Foote    MRN: [id number]           Patient Information     Date Of Birth          1950        Visit Information        Provider Department      8/24/2017 10:20 AM Allan Casey MD [facility]        Today's Diagnoses     Chest wall pain    -  1    Type 2 diabetes mellitus with diabetic peripheral angiopathy without gangrene, without long-term current use of insulin (H)        Need for hepatitis C screening test           Follow-ups after your visit        Follow-up notes from your care team     Return if symptoms worsen or fail to improve, for Lab Work, Physical Exam.      Your next 10 appointments already scheduled     Aug 24, 2017 10:20 AM CDT   Office Visit with Allan Casey MD   [facility] ([facility])    [address] [place] [address] [id number] [phone]           Bring a current list of meds and any records pertaining to this visit. For Physicals, please bring immunization records and any forms needing to be filled out. Please arrive 10 minutes early to complete paperwork.            Aug 24, 2017 11:00 AM CDT   LAB with DANIELO LAB   [facility] ([facility])    [address] [place] [address] [id number] [phone]           Patient must bring picture ID. Patient should be prepared to give a urine specimen  Please do not eat 10-12 hours before your appointment if you are coming in fasting for labs on lipids, cholesterol, or glucose (sugar). Pregnant women should follow their Care Team instructions. Water with medications is okay. Do not drink coffee or other fluids. If you have concerns about taking  your medications, please ask at office or if scheduling via [employer], send a message by clicking on Secure Messaging, Message Your Care Team.            Aug 28, 2017  1:30 PM CDT   (Arrive by 1:15 PM)   Return  Visit with Alina Jennings MD   [facility] ([facility])    [address]  3rd Floor  [place] [address]   [phone]            Sep 08, 2017 10:00 AM CDT   (Arrive by 9:45 AM)   New Patient Visit with VICTOR M Floyd CNP   [facility] ([facility] [place])    [address]  4th Floor  [place] [address]   [phone]            Sep 27, 2017 11:15 AM CDT   Return Visit with Bj Anderson MD   [facility] [place] ([facility])    [address]  [facility] [address] [phone]            Oct 18, 2017 12:00 PM CDT   Return Visit with Elizabeth Oliver MD   [facility] ([facility])    [address]  [facility] [address] [phone]            Nov 02, 2017  9:15 AM CDT   RETURN GLAUCOMA with Marely Robin MD   [facility] ([facility])    [address]  [address]  [address]th [address]  [place] [facility] [address]   [phone]            Nov 10, 2017  9:20 AM CST   (Arrive by 9:05 AM)   RETURN DIABETES with Michelle Irizarry MD   [facility] ([facility] [place])    [address]  3rd Floor  [place] [facility] [id number] [phone]              Who to contact     If you have questions or need follow up information about today's clinic visit or your schedule please contact [facility] directly at [phone].  Normal or non-critical lab and imaging results will be communicated to you by MyChart, letter or phone within 4 business days after the clinic has received the results. If you do not hear from us within 7 days, please contact the clinic through MyChart or phone. If you have a critical or abnormal lab result, we will notify you by phone as soon as  "possible.  Submit refill requests through [employer] or call your pharmacy and they will forward the refill request to us. Please allow 3 business days for your refill to be completed.          Additional Information About Your Visit        Korem Information     Korem lets you send messages to your doctor, view your test results, renew your prescriptions, schedule appointments and more. To sign up, go to [email or web address] . Click on \"Log in\" on the left side of the screen, which will take you to the Welcome page. Then click on \"Sign up Now\" on the right side of the page.     You will be asked to enter the access code listed below, as well as some personal information. Please follow the directions to create your username and password.     Your access code is: [email or web address]  Expires: 10/17/2017  1:21 PM     Your access code will  in 90 days. If you need help or a new code, please call your [facility] clinic or [phone].        Care EveryWhere ID     This is your Care EveryWhere ID. This could be used by other organizations to access your [facility] medical records  [id number]        Your Vitals Were     Pulse Temperature Pulse Oximetry BMI (Body Mass Index)          96 98.4  F (36.9  C) (Oral) 88% 20.83 kg/m2         Blood Pressure from Last 3 Encounters:   17 116/70   17 167/81   17 143/81    Weight from Last 3 Encounters:   17 133 lb (60.3 kg)   17 133 lb (60.3 kg)   17 133 lb (60.3 kg)              We Performed the Following     Hemoglobin A1c     Hepatitis C antibody          Today's Medication Changes          These changes are accurate as of: 17  9:45 AM.  If you have any questions, ask your nurse or doctor.               These medicines have changed or have updated prescriptions.        Dose/Directions    aspirin 81 MG EC tablet   This may have changed:  when to take this   Used for:  Unstable angina (H)        Dose:  81 mg   Take 1 tablet (81 mg) by mouth " daily   Quantity:  90 tablet   Refills:  3       atorvastatin 40 MG tablet   Commonly known as:  LIPITOR   This may have changed:  when to take this   Used for:  Hyperlipidemia LDL goal <100        Dose:  40 mg   Take 1 tablet (40 mg) by mouth daily   Quantity:  90 tablet   Refills:  3       hydrochlorothiazide 12.5 MG capsule   Commonly known as:  MICROZIDE   This may have changed:  additional instructions   Used for:  Essential hypertension with goal blood pressure less than 130/80        Dose:  12.5 mg   Take 1 capsule (12.5 mg) by mouth daily   Quantity:  90 capsule   Refills:  3       ondansetron 4 MG ODT tab   Commonly known as:  ZOFRAN-ODT   This may have changed:    - when to take this  - reasons to take this   Used for:  Intractable vomiting with nausea, unspecified vomiting type        Dose:  4 mg   Take 1 tablet (4 mg) by mouth every 6 hours   Quantity:  120 tablet   Refills:  0       Phenytoin Sodium Extended 200 MG Caps   This may have changed:  additional instructions   Used for:  Seizure disorder (H)        Dose:  200 mg   Take 200 mg by mouth 2 times daily   Quantity:  60 capsule   Refills:  0            Where to get your medicines      These medications were sent to [facility] [address] [place] [address] [address] AT [facility] [place] [address] [address], [facility] [address]    Hours:  24-hours Phone:  [phone]     blood glucose monitoring lancets    blood glucose monitoring test strip                Primary Care Provider Office Phone # Fax #    Allan Casey [phone]       [address] [facility] [address]        Equal Access to Services     KARI CARREON AH: Hadii shaheen ku hadasho Soomaali, waaxda luqadaha, qaybta kaalmada adeegyada, tonie marroquin. So [facility] [phone].    ATENCIÓN: Si habla español, tiene a briones disposición servicios gratuitos de asistencia lingüística. Llame al [phone].    We comply with  applicable federal civil rights laws and [place] laws. We do not discriminate on the basis of race, color, national origin, age, disability sex, sexual orientation or gender identity.            Thank you!     Thank you for choosing [facility]  for your care. Our goal is always to provide you with excellent care. Hearing back from our patients is one way we can continue to improve our services. Please take a few minutes to complete the written survey that you may receive in the mail after your visit with us. Thank you!             Your Updated Medication List - Protect others around you: Learn how to safely use, store and throw away your medicines at www.disposemymeds.org.          This list is accurate as of: 8/24/17  9:45 AM.  Always use your most recent med list.                   Brand Name Dispense Instructions for use Diagnosis    ACETAMINOPHEN PO      Take 1,000 mg by mouth every 6 hours as needed for fever Taking q4-6 hours, per MAR        ADVAIR DISKUS 250-50 MCG/DOSE diskus inhaler   Generic drug:  fluticasone-salmeterol      Inhale 1 puff into the lungs 2 times daily        albuterol (2.5 MG/3ML) 0.083% neb solution     360 mL    INHALE 1 VIAL VIA NEBULIZER EVERY 6 HOURS AS NEEDED    Chronic obstructive pulmonary disease, unspecified COPD type (H)       aspirin 81 MG EC tablet     90 tablet    Take 1 tablet (81 mg) by mouth daily    Unstable angina (H)       atorvastatin 40 MG tablet    LIPITOR    90 tablet    Take 1 tablet (40 mg) by mouth daily    Hyperlipidemia LDL goal <100       blood glucose monitoring meter device kit     1 kit    Use to test blood sugars 3 times daily or as directed.    Type 2 diabetes, HbA1C goal < 8% (H)       blood glucose monitoring test strip    ONE TOUCH ULTRA    3 Box    Use to test blood sugars 3 times daily or as directed.    Type 2 diabetes mellitus with diabetic peripheral angiopathy without gangrene, without long-term current use of insulin  (H)       calcium citrate-vitamin D 315-250 MG-UNIT Tabs per tablet    CITRACAL    120 tablet    Take 2 tablets by mouth daily    Osteoporosis       cetirizine 10 MG tablet    zyrTEC    90 tablet    Take 1 tablet (10 mg) by mouth daily as needed for allergies    Seasonal allergic rhinitis, unspecified allergic rhinitis trigger       clopidogrel 75 MG tablet    PLAVIX    90 tablet    Take 1 tablet (75 mg) by mouth daily    PAD (peripheral artery disease) (H), UGI bleed, Osteoporosis, Nausea       CYANOCOBALAMIN PO      Take 2,000 mcg by mouth daily        cyclobenzaprine 10 MG tablet    FLEXERIL    30 tablet    Take 0.5-1 tablets (5-10 mg) by mouth 3 times daily as needed for muscle spasms    Cervicalgia       diclofenac 1 % Gel topical gel    VOLTAREN    100 g    Apply 2 g topically 4 times daily to hands    Primary osteoarthritis of both hands       dorzolamide 2 % ophthalmic solution    TRUSOPT     Place 1 drop into both eyes 2 times daily        * EASY TOUCH LANCETS 30G/TWIST Misc           * blood glucose monitoring lancets     100 each    Use to test blood sugar 3 times daily or as directed.    Type 2 diabetes mellitus with diabetic peripheral angiopathy without gangrene, without long-term current use of insulin (H)       EPINEPHrine 0.15 MG/0.3ML injection 2-pack    EPIPEN JR    0.6 mL    Inject 0.3 mLs (0.15 mg) into the muscle as needed for anaphylaxis    Bee sting reaction, undetermined intent, subsequent encounter       ESCITALOPRAM OXALATE PO      Take 10 mg by mouth daily        estradiol 0.1 MG/GM cream    ESTRACE VAGINAL    42.5 g    Use dime size amount 3x a week at night    Personal history of urinary tract infection       estradiol 1 MG tablet    ESTRACE    90 tablet    Take 1 tablet (1 mg) by mouth daily    Person who has had sex change operation       ferrous sulfate 325 (65 FE) MG tablet    IRON    60 tablet    Take 1 tablet (325 mg) by mouth 2 times daily With meals        fluticasone 50 MCG/ACT  spray    FLONASE     Spray 1 spray into both nostrils daily        hydrochlorothiazide 12.5 MG capsule    MICROZIDE    90 capsule    Take 1 capsule (12.5 mg) by mouth daily    Essential hypertension with goal blood pressure less than 130/80       HYDROmorphone 2 MG tablet    DILAUDID    30 tablet    Take 1 tablet (2 mg) by mouth every 3 hours as needed for moderate to severe pain        RENÉE Pack      Take 1 packet by mouth 2 times daily        latanoprost 0.005 % ophthalmic solution    XALATAN    2.5 mL    Place 1 drop into both eyes At Bedtime    Primary open angle glaucoma, stage unspecified       levETIRAcetam 500 MG tablet    KEPPRA    180 tablet    Take 1 tablet (500 mg) by mouth 2 times daily    Nausea       lidocaine 5 % Patch    LIDODERM    30 patch    APPLY 1 TO 3 PATCHES TO PAINFUL AREA AT ONCE FOR UP TO 12 HOURS WITHIN A 24 HOUR PERIOD REMOVE AFTER 12 HOURS    Chronic pain syndrome, 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 (H)       lisinopril 10 MG tablet    PRINIVIL/ZESTRIL    90 tablet    Take 1 tablet (10 mg) by mouth daily    Essential hypertension with goal blood pressure less than 130/80       MEDICATION GIVEN BY INTRATHECAL PUMP - INSTRUCTION      continuous May 19, 2017 - per [facility] [place] [phone]: Conc: Bupivacaine 20 mg/mL and Fentanyl 2000 mcg/mL. Continuous: Bupivacaine 7.265 mg/day and Fentanyl 726.5 mcg/day. Boluses: Up to 7 boluses per 24-hr period of Bupivicaine 0.599 mg and Fentanyl 59.9 mcg  Pump Refill Date at [facility]: 7/2/17        metoprolol 25 MG 24 hr tablet    TOPROL-XL    30 tablet    TAKE 1 TABLET (25 MG) BY MOUTH DAILY    Old myocardial infarction       MULTIVITAMIN PO      Take 1 tablet by mouth daily        nitroGLYcerin 0.4 MG sublingual tablet    NITROSTAT    25 tablet    Place 1 tablet (0.4 mg) under the tongue every 5 minutes as needed for chest pain if you are still having symptoms after 3 doses (15  "minutes) call 911.    Chronic systolic congestive heart failure (H), Old myocardial infarction       ondansetron 4 MG ODT tab    ZOFRAN-ODT    120 tablet    Take 1 tablet (4 mg) by mouth every 6 hours    Intractable vomiting with nausea, unspecified vomiting type       * order for DME     1 Month    Equipment being ordered: Depends briefs    Incontinence       * order for DME     1 Device    Equipment being ordered: Power Wheelchair    CVA (cerebral infarction), HTN (hypertension)       * order for DME     1 Units    Equipment being ordered: Nebulizer and supplies    Obstructive chronic bronchitis with exacerbation (H)       * order for DME     1 Box    Equipment being ordered: Glucerna daily shakes with each meal    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)       * order for DME     1 Device    ACcu check BID    Type 2 diabetes mellitus with diabetic peripheral angiopathy without gangrene, without long-term current use of insulin (H)       * order for DME     1 Units    Equipment being ordered: Nebulizer and tubing supplies    Simple chronic bronchitis (H)       * order for DME     1 Device    Equipment being ordered: CPAP supplies mask, hose, filters, cushion fax to [facility] at [phone] Disp: 10 DeviceRefills: prn Class: Local PrintStart: 2/10/2017    Chronic obstructive pulmonary disease, unspecified COPD type (H)       * order for DME     10 Device    Equipment being ordered: CPAP supplies mask, hose, filters, cushion  fax to [facility] at [phone]    COPD (chronic obstructive pulmonary disease) (H)       * order for DME     1 Device    Equipment being ordered: wheelchair seat cushion    Critical lower limb ischemia       * order for DME     1 Device    roho w/c cushion 18\" X 18\" for pressure relief    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 (H)       * order for DME     1 Device    Hospital bed with side rails    Status post below knee " amputation of right lower extremity (H)       * order for DME     1 Device    Full electric hospital bed with half rails  Dx: X13027, I110, J449 Length of need: lifetime    Status post bilateral above knee amputation (H)       * order for DME     1 Device    Wheel Chair Cushion: 18 x 18 inch Roho cushion    Status post bilateral above knee amputation (H)       pantoprazole 40 MG EC tablet    PROTONIX     Take 40 mg by mouth daily        Phenytoin Sodium Extended 200 MG Caps     60 capsule    Take 200 mg by mouth 2 times daily    Seizure disorder (H)       polyethylene glycol Packet    MIRALAX/GLYCOLAX     Take 17 g by mouth daily Dissolved in water or juice        pregabalin 75 MG capsule    LYRICA    120 capsule    Take 2 capsules (150 mg) by mouth 2 times daily    Pain in both upper extremities       prochlorperazine 10 MG tablet    COMPAZINE    20 tablet    Take 1 tablet (10 mg) by mouth every 8 hours as needed    Nausea with vomiting       risperiDONE 0.5 MG tablet    risperDAL     Take 0.5 mg by mouth At Bedtime        sucralfate 1 GM tablet    CARAFATE    40 tablet    Take 1 tablet (1 g) by mouth 4 times daily May dissolve in 10 mL water is necessary. (Start upon completion of carafate suspension)    Gastroesophageal reflux disease without esophagitis       traZODone 100 MG tablet    DESYREL    90 tablet    Take 1.5 tablets (150 mg) by mouth At Bedtime    Anxiety state       umeclidinium 62.5 MCG/INH oral inhaler    INCRUSE ELLIPTA    3 Inhaler    Inhale 1 puff into the lungs daily    Chronic obstructive pulmonary disease, unspecified COPD type (H)       vitamin D 2000 UNITS tablet     100 tablet    Take 2,000 Units by mouth daily.        zinc 50 MG Tabs      Take 1 tablet by mouth daily        * Notice:  This list has 15 medication(s) that are the same as other medications prescribed for you. Read the directions carefully, and ask your doctor or other care provider to review them with you.

## 2017-08-24 NOTE — LETTER
[facility]  [address] [id number] [phone]      8/25/2017       Sonya Foote  [address]   [place] [address]      Dear Sonya,      I have enclosed a copy of your most recent labs done here at the clinic and if available some of your prior labs for comparison.     Your Hemoglobin A1C and blood sugar tests look good and I would continue with your medication without change.  These tests should be repeated in 6 months.    Your Hepatitis C study also returned normal.    Please call me if you have further questions.        Allan Casey MD

## 2017-08-24 NOTE — PROGRESS NOTES
SUBJECTIVE:   Sonya Foote is a 67 year old female who presents to clinic today for the following health issues:      ED/UC Followup:    Facility:  [facility] ER  Date of visit: 8/13/17  Reason for visit: Acute left-sided lower back pain   Current Status: Stable- slight improvement since being seen in ER. Discomfort with laying on side and sitting      ER note reviewed:  Ct scan done.  Impression:   1. No evidence of renal calculi or stone in the ureters or bladder. No  perinephric stranding to suggest pyelonephritis.  2. Mild scattered diverticulosis without significant evidence to  suggest acute diverticulitis.  2. Small amount of pneumobilia in the left hepatic lobe, likely  related to prior ERCP and sphincterotomy.    Problem list and histories reviewed & adjusted, as indicated.  Additional history: as documented    Patient Active Problem List   Diagnosis     Hyperlipidemia LDL goal <100     Seizure disorder (H)     ACP (advance care planning)     Osteoporosis     Schizoaffective disorder (H)     AS (sickle cell trait) (H)     Vertigo     Person who has had sex change operation     Claudication in peripheral vascular disease (H)     Intestinal malabsorption     GIB (gastrointestinal bleeding)     Cervicalgia     Health Care Home     Asthma     Adjustment disorder with depressed mood     Chronic pain syndrome     Open-angle glaucoma     History of colonic polyps     Old myocardial infarction     Iron deficiency anemia     Late effects of cerebrovascular disease     Degeneration of intervertebral disc of lumbosacral region     Thoracic or lumbosacral neuritis or radiculitis     Cerebral infarction due to occlusion or stenosis of carotid artery     Disorder of bone and cartilage     Hereditary and idiopathic peripheral neuropathy     Androgen insensitivity syndrome     PAD (peripheral artery disease) (H)     Chronic systolic congestive heart failure (H)     Carotid stenosis, left     Primary osteoarthritis of both hands      Pain in both upper extremities     Atherosclerotic peripheral vascular disease with rest pain (H)     Essential hypertension with goal blood pressure less than 130/80     Cellulitis of right ankle     Angina pectoris, crescendo (H)     Type 2 diabetes mellitus with diabetic peripheral angiopathy without gangrene, without long-term current use of insulin (H)     Anemia, unspecified type     Critical lower limb ischemia     Testicular feminization     Anxiety disorder due to general medical condition     Central retinal artery occlusion     Lumbosacral radiculitis     Peripheral nerve disease (H)     Osteopenia     Prediabetes     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 (H)     Primary open angle glaucoma of both eyes, severe stage     Pseudophakia of right eye     Cataract, left eye     Diabetes mellitus type 2 without retinopathy (H)     Pyelonephritis     Chronic obstructive pulmonary disease, unspecified COPD type (H)     JOSE (obstructive sleep apnea)     Complex sleep apnea syndrome     Coronary artery disease of native artery of native heart with stable angina pectoris (H)     Hyperlipidemia LDL goal <70     Ischemic cardiomyopathy     Bee sting reaction, undetermined intent, subsequent encounter     Functional incontinence     Personal history of urinary tract infection     Past Surgical History:   Procedure Laterality Date     AMPUTATE LEG ABOVE KNEE Left 6/11/2016    Procedure: AMPUTATE LEG ABOVE KNEE;  Surgeon: Mello Rodriguez MD;  [facility]     AMPUTATE LEG BELOW KNEE Right 11/7/2016    Procedure: AMPUTATE LEG BELOW KNEE;  Surgeon: Savannah Durant MD;  [facility]     AMPUTATE REVISION STUMP LOWER EXTREMITY Right 11/11/2016    Procedure: AMPUTATE REVISION STUMP LOWER EXTREMITY;  Surgeon: Savannah Durant MD;  [facility]     AMPUTATE REVISION STUMP LOWER EXTREMITY Right 11/16/2016    Procedure: AMPUTATE REVISION STUMP LOWER EXTREMITY;  Surgeon: Savannah Durant MD;   [facility]     AMPUTATE TOE(S) Right 1/5/2016    Procedure: AMPUTATE TOE(S);  Surgeon: Mello Gaines DPM;  Location:  [facility]     ANGIOGRAM Bilateral 11/21/2014    Procedure: ANGIOGRAM;  Surgeon: Savannah Durant MD;  [facility]     ANGIOGRAM Left 1/16/2015    Procedure: ANGIOGRAM;  Surgeon: Savannah uDrant MD;  [facility]     ANGIOGRAM Bilateral 9/14/2015    Procedure: ANGIOGRAM;  Surgeon: Savannah Durant MD;  [facility]     ANGIOGRAM Left 10/12/2015    Procedure: ANGIOGRAM;  Surgeon: Savannah Durant MD;  [facility]     ANGIOGRAM Right 6/6/2016    Procedure: ANGIOGRAM;  Surgeon: Savannah Durant MD;  [facility]     ANGIOPLASTY Right 6/6/2016    Procedure: ANGIOPLASTY;  Surgeon: Savannah Durant MD;  [facility]     APPENDECTOMY       BREAST SURGERY      right breast bx (benign)     CHOLECYSTECTOMY       COLONOSCOPY N/A 8/25/2014    Procedure: COLONOSCOPY;  Surgeon: Mello Ferrer MD;  [facility]     COLONOSCOPY WITH CO2 INSUFFLATION N/A 8/20/2014    Procedure: COLONOSCOPY WITH CO2 INSUFFLATION;  Surgeon: Duane, William Charles, MD;  [facility]: [facility]     ENDARTERECTOMY FEMORAL  5/23/2014    Procedure: ENDARTERECTOMY FEMORAL;  Surgeon: Jason Joshi MD;  [facility]     ESOPHAGOSCOPY, GASTROSCOPY, DUODENOSCOPY (EGD), COMBINED  12/14/2012    Procedure: COMBINED ESOPHAGOSCOPY, GASTROSCOPY, DUODENOSCOPY (EGD), BIOPSY SINGLE OR MULTIPLE;  ESOPHAGOSCOPY, GASTROSCOPY, DUODENOSCOPY (EGD), DILATATION ;  Surgeon: Elizabeth Stevenson MD;  Location:  GI     ESOPHAGOSCOPY, GASTROSCOPY, DUODENOSCOPY (EGD), COMBINED  12/31/2013    Procedure: COMBINED ESOPHAGOSCOPY, GASTROSCOPY, DUODENOSCOPY (EGD), BIOPSY SINGLE OR MULTIPLE;;  Surgeon: Clemente Lopez MD;  Location:  GI     ESOPHAGOSCOPY, GASTROSCOPY, DUODENOSCOPY (EGD), COMBINED  4/1/2014    Procedure: COMBINED ESOPHAGOSCOPY, GASTROSCOPY, DUODENOSCOPY (EGD);;  Surgeon: Clemente Lopez MD;  [facility]     ESOPHAGOSCOPY, GASTROSCOPY,  DUODENOSCOPY (EGD), COMBINED  6/28/2014    Procedure: COMBINED ESOPHAGOSCOPY, GASTROSCOPY, DUODENOSCOPY (EGD);  Surgeon: Clemente Lopez MD;  [facility]     ESOPHAGOSCOPY, GASTROSCOPY, DUODENOSCOPY (EGD), COMBINED N/A 8/20/2014    Procedure: COMBINED ESOPHAGOSCOPY, GASTROSCOPY, DUODENOSCOPY (EGD), BIOPSY SINGLE OR MULTIPLE;  Surgeon: Duane, William Charles, MD;  Location:  OR     ESOPHAGOSCOPY, GASTROSCOPY, DUODENOSCOPY (EGD), COMBINED N/A 8/22/2014    Procedure: COMBINED ESOPHAGOSCOPY, GASTROSCOPY, DUODENOSCOPY (EGD), BIOPSY SINGLE OR MULTIPLE;  Surgeon: Mello Ferrer MD;  [facility]     ESOPHAGOSCOPY, GASTROSCOPY, DUODENOSCOPY (EGD), COMBINED N/A 10/2/2014    Procedure: COMBINED ESOPHAGOSCOPY, GASTROSCOPY, DUODENOSCOPY (EGD), BIOPSY SINGLE OR MULTIPLE;  Surgeon: Remy Haskins MD;  [facility]     ESOPHAGOSCOPY, GASTROSCOPY, DUODENOSCOPY (EGD), COMBINED Left 12/15/2014    Procedure: COMBINED ESOPHAGOSCOPY, GASTROSCOPY, DUODENOSCOPY (EGD), BIOPSY SINGLE OR MULTIPLE;  Surgeon: Remy Haskins MD;  [facility]     ESOPHAGOSCOPY, GASTROSCOPY, DUODENOSCOPY (EGD), COMBINED N/A 2/25/2015    Procedure: COMBINED ENDOSCOPIC ULTRASOUND, ESOPHAGOSCOPY, GASTROSCOPY, DUODENOSCOPY (EGD), FINE NEEDLE ASPIRATE/BIOPSY;  Surgeon: Clemente Lugo MD;  [facility]     ESOPHAGOSCOPY, GASTROSCOPY, DUODENOSCOPY (EGD), COMBINED Left 2/25/2015    Procedure: COMBINED ESOPHAGOSCOPY, GASTROSCOPY, DUODENOSCOPY (EGD), BIOPSY SINGLE OR MULTIPLE;  Surgeon: Clemente Lugo MD;  [facility]     ESOPHAGOSCOPY, GASTROSCOPY, DUODENOSCOPY (EGD), COMBINED N/A 9/25/2016    Procedure: COMBINED ESOPHAGOSCOPY, GASTROSCOPY, DUODENOSCOPY (EGD);  Surgeon: Aziza Patiño MD;  [facility]     ESOPHAGOSCOPY, GASTROSCOPY, DUODENOSCOPY (EGD), COMBINED N/A 1/18/2017    Procedure: COMBINED ESOPHAGOSCOPY, GASTROSCOPY, DUODENOSCOPY (EGD), BIOPSY SINGLE OR MULTIPLE;  Surgeon: Clemente Lopez MD;  Location:   GI     FASCIOTOMY LOWER EXTREMITY Left 6/10/2016    Procedure: FASCIOTOMY LOWER EXTREMITY;  Surgeon: Mello Rodriguez MD;  [facility]     HC CAPSULE ENDOSCOPY N/A 8/25/2014    Procedure: CAPSULE/PILL CAM ENDOSCOPY;  Surgeon: Reym Haskins MD;  [facility]     HC CAPSULE ENDOSCOPY N/A 10/2/2014    Procedure: CAPSULE/PILL CAM ENDOSCOPY;  Surgeon: Remy Haskins MD;  [facility]     ORTHOPEDIC SURGERY      broken wrist repair     SEX TRANSFORMATION SURGERY, MALE TO FEMALE      1974     SINUS SURGERY      cyst removed     TONSILLECTOMY       VASCULAR SURGERY      Left carotid stent       Social History   Substance Use Topics     Smoking status: Former Smoker     Packs/day: 2.50     Years: 30.00     Types: Cigarettes, Cigars     Quit date: 11/1/2000     Smokeless tobacco: Never Used     Alcohol use No     Family History   Problem Relation Age of Onset     Dementia Mother      Glaucoma Mother      DIABETES Mother      Coronary Artery Disease Mother      MI     Glaucoma Father      DIABETES Father      Heart Failure Father      CANCER Maternal Aunt      leukemia     Schizophrenia Brother      Depression Brother      Suicide Sister      Depression Sister      DIABETES Sister      CANCER Maternal Aunt      ovarian     Glaucoma Maternal Grandmother      DIABETES Maternal Grandmother      Glaucoma Maternal Grandfather      DIABETES Maternal Grandfather      Glaucoma Paternal Grandmother      DIABETES Paternal Grandmother      Glaucoma Paternal Grandfather      DIABETES Paternal Grandfather      Breast Cancer Sister      CEREBROVASCULAR DISEASE Brother          Current Outpatient Prescriptions   Medication Sig Dispense Refill     lidocaine (LIDODERM) 5 % Patch APPLY 1 TO 3 PATCHES TO PAINFUL AREA AT ONCE FOR UP TO 12 HOURS WITHIN A 24 HOUR PERIOD REMOVE AFTER 12 HOURS 30 patch 1     estradiol (ESTRACE VAGINAL) 0.1 MG/GM cream Use dime size amount 3x a week at night 42.5 g 3     metoprolol  "(TOPROL-XL) 25 MG 24 hr tablet TAKE 1 TABLET (25 MG) BY MOUTH DAILY 30 tablet 10     traZODone (DESYREL) 100 MG tablet Take 1.5 tablets (150 mg) by mouth At Bedtime 90 tablet 3     order for [facility] [place] Full electric hospital bed with half rails    Dx: I02298, I110, J449  Length of need: lifetime 1 Device 0     order for [facility] [place] Wheel Chair Cushion: 18 x 18 inch Roho cushion 1 Device 0     cyclobenzaprine (FLEXERIL) 10 MG tablet Take 0.5-1 tablets (5-10 mg) by mouth 3 times daily as needed for muscle spasms 30 tablet 1     umeclidinium (INCRUSE ELLIPTA) 62.5 MCG/INH oral inhaler Inhale 1 puff into the lungs daily 3 Inhaler 3     order for [facility] [place] roho w/c cushion 18\" X 18\" for pressure relief 1 Device 0     order for [facility] [place] Hospital bed with side rails 1 Device 0     polyethylene glycol (MIRALAX/GLYCOLAX) Packet Take 17 g by mouth daily Dissolved in water or juice       ESCITALOPRAM OXALATE PO Take 10 mg by mouth daily       ACETAMINOPHEN PO Take 1,000 mg by mouth every 6 hours as needed for fever Taking q4-6 hours, per MAR       pregabalin (LYRICA) 75 MG capsule Take 2 capsules (150 mg) by mouth 2 times daily 120 capsule 5     cetirizine (ZYRTEC) 10 MG tablet Take 1 tablet (10 mg) by mouth daily as needed for allergies 90 tablet 3     diclofenac (VOLTAREN) 1 % GEL topical gel Apply 2 g topically 4 times daily to hands 100 g 11     EPINEPHrine (EPIPEN JR) 0.15 MG/0.3ML injection Inject 0.3 mLs (0.15 mg) into the muscle as needed for anaphylaxis 0.6 mL 1     lisinopril (PRINIVIL/ZESTRIL) 10 MG tablet Take 1 tablet (10 mg) by mouth daily 90 tablet 3     pantoprazole (PROTONIX) 40 MG EC tablet Take 40 mg by mouth daily       risperiDONE (RISPERDAL) 0.5 MG tablet Take 0.5 mg by mouth At Bedtime       ferrous sulfate (IRON) 325 (65 FE) MG tablet Take 1 tablet (325 mg) by mouth 2 times daily With meals 60 tablet 2     blood glucose monitoring (ONE TOUCH ULTRA) test strip Use to test blood sugars 3 times daily or as directed. 3 Box 3     " order for DME Equipment being ordered: wheelchair seat cushion 1 Device 0     order for DME Equipment being ordered: CPAP supplies mask, hose, filters, cushion    fax to [facility] at [phone] 10 Device prn     sucralfate (CARAFATE) 1 GM tablet Take 1 tablet (1 g) by mouth 4 times daily May dissolve in 10 mL water is necessary. (Start upon completion of carafate suspension) 40 tablet 5     order for DME Equipment being ordered: CPAP supplies mask, hose, filters, cushion fax to [facility] at [phone]  Disp: 10 Device Refills: prn   Class: Local Print Start: 2/10/2017 1 Device 0     order for DME Equipment being ordered: Nebulizer and tubing supplies 1 Units 0     albuterol (2.5 MG/3ML) 0.083% neb solution INHALE 1 VIAL VIA NEBULIZER EVERY 6 HOURS AS NEEDED 360 mL 11     order for DME ACcu check BID 1 Device 0     CYANOCOBALAMIN PO Take 2,000 mcg by mouth daily       Nutritional Supplements (RENÉE) PACK Take 1 packet by mouth 2 times daily       fluticasone (FLONASE) 50 MCG/ACT nasal spray Spray 1 spray into both nostrils daily       ADVAIR DISKUS 250-50 MCG/DOSE diskus inhaler Inhale 1 puff into the lungs 2 times daily        calcium citrate-vitamin D (CITRACAL) 315-250 MG-UNIT TABS Take 2 tablets by mouth daily 120 tablet 5     hydrochlorothiazide (MICROZIDE) 12.5 MG capsule Take 1 capsule (12.5 mg) by mouth daily (Patient taking differently: Take 12.5 mg by mouth daily Hold for sbp<90) 90 capsule 3     estradiol (ESTRACE) 1 MG tablet Take 1 tablet (1 mg) by mouth daily 90 tablet 3     levETIRAcetam (KEPPRA) 500 MG tablet Take 1 tablet (500 mg) by mouth 2 times daily 180 tablet 1     aspirin EC 81 MG EC tablet Take 1 tablet (81 mg) by mouth daily (Patient taking differently: Take 81 mg by mouth every morning ) 90 tablet 3     clopidogrel (PLAVIX) 75 MG tablet Take 1 tablet (75 mg) by mouth daily 90 tablet 3     blood glucose monitoring (ONE TOUCH ULTRA 2) meter device kit Use to test blood sugars 3  times daily or as directed. 1 kit 0     blood glucose monitoring (ONE TOUCH ULTRASOFT) lancets Use to test blood sugar 3 times daily or as directed. 3 Box 3     phenytoin 200 MG CAPS Take 200 mg by mouth 2 times daily (Patient taking differently: Take 200 mg by mouth 2 times daily (takes at 8 AM and 8 PM)) 60 capsule 0     dorzolamide (TRUSOPT) 2 % ophthalmic solution Place 1 drop into both eyes 2 times daily        zinc 50 MG TABS Take 1 tablet by mouth daily       nitroglycerin (NITROSTAT) 0.4 MG SL tablet Place 1 tablet (0.4 mg) under the tongue every 5 minutes as needed for chest pain if you are still having symptoms after 3 doses (15 minutes) call 911. 25 tablet 1     MEDICATION GIVEN BY INTRATHECAL PUMP - INSTRUCTION continuous May 19, 2017 - per [facility] in [place] [phone]:  Conc: Bupivacaine 20 mg/mL and Fentanyl 2000 mcg/mL.  Continuous: Bupivacaine 7.265 mg/day and Fentanyl 726.5 mcg/day.  Boluses: Up to 7 boluses per 24-hr period of Bupivicaine 0.599 mg and Fentanyl 59.9 mcg    Pump Refill Date at [facility]: 7/2/17       latanoprost (XALATAN) 0.005 % ophthalmic solution Place 1 drop into both eyes At Bedtime 2.5 mL 11     atorvastatin (LIPITOR) 40 MG tablet Take 1 tablet (40 mg) by mouth daily (Patient taking differently: Take 40 mg by mouth At Bedtime ) 90 tablet 3     order for DME Equipment being ordered: Glucerna daily shakes with each meal 1 Box 11     ORDER FOR DME Equipment being ordered: Nebulizer and supplies 1 Units 0     ORDER FOR DME Equipment being ordered: Power Wheelchair 1 Device 0     ORDER FOR DME Equipment being ordered: Depends briefs 1 Month 11     EASY TOUCH LANCETS 30G/TWIST MISC        Cholecalciferol (VITAMIN D) 2000 UNITS tablet Take 2,000 Units by mouth daily. 100 tablet 3     Multiple Vitamin (MULTIVITAMIN OR) Take 1 tablet by mouth daily        ondansetron (ZOFRAN-ODT) 4 MG ODT tab Take 1 tablet (4 mg) by mouth every 6 hours (Patient  taking differently: Take 4 mg by mouth every 6 hours as needed ) 120 tablet 0     HYDROmorphone (DILAUDID) 2 MG tablet Take 1 tablet (2 mg) by mouth every 3 hours as needed for moderate to severe pain 30 tablet 0     prochlorperazine (COMPAZINE) 10 MG tablet Take 1 tablet (10 mg) by mouth every 8 hours as needed 20 tablet 0     Allergies   Allergen Reactions     Bee Venom      Penicillins Anaphylaxis     Other reaction(s): Skin Rash and/or Hives     Dilantin [Phenytoin] Other (See Comments)     Generic dilantin only per pt     Iodide Hives     09/11/15: Pt states she can use iodine and doesn't have any problems      Iodine-131      Novocaine [Procaine] Hives     Other reaction(s): Skin Rash and/or Hives     BP Readings from Last 3 Encounters:   08/24/17 116/70   08/13/17 167/81   07/25/17 143/81    Wt Readings from Last 3 Encounters:   08/24/17 133 lb (60.3 kg)   08/13/17 133 lb (60.3 kg)   07/25/17 133 lb (60.3 kg)              Reviewed and updated as needed this visit by clinical staffTobacco  Allergies  Med Hx  Surg Hx  Fam Hx  Soc Hx      Reviewed and updated as needed this visit by Provider         ROS:  C: NEGATIVE for fever, chills, change in weight  E/M: NEGATIVE for ear, mouth and throat problems  R: NEGATIVE for significant cough or SOB  GI: NEGATIVE for nausea, abdominal pain, heartburn, or change in bowel habits  : NEGATIVE for frequency, dysuria, or hematuria  M: NEGATIVE for significant arthralgias or myalgia  H: NEGATIVE for bleeding problems  P: NEGATIVE for changes in mood or affect    OBJECTIVE:                                                    /70  Pulse 96  Temp 98.4  F (36.9  C) (Oral)  Wt 133 lb (60.3 kg)  SpO2 (!) 88%  BMI 20.83 kg/m2  Body mass index is 20.83 kg/(m^2).  GENERAL: alert and no distress  EYES: Eyes grossly normal to inspection, extraocular movements - intact, and PERRL  HENT: ear canals- normal; TMs- normal; Nose- normal; Mouth- no ulcers, no lesions  NECK: no  tenderness, no adenopathy, no asymmetry, no masses, no stiffness; thyroid- normal to palpation  RESP: lungs clear to auscultation - no rales, no rhonchi, no wheezes, noted left chest wall pain with palpation that reproducible on exam  CV: regular rates and rhythm, normal S1 S2, no S3 or S4 and no click or rub   MS: in wheelchair, absent legs, BKA's  PSYCH: Alert and oriented times 3; speech- coherent , normal rate and volume; able to articulate logical thoughts, able to abstract reason, no tangential thoughts, no hallucinations or delusions, affect- normal       Lab Results   Component Value Date    A1C 4.1 01/24/2017    A1C 4.9 06/06/2016    A1C 4.8 08/11/2015    A1C 4.7 03/06/2015    A1C 4.8 03/02/2015       ASSESSMENT/PLAN:                                                    (R07.89) Chest wall pain  (primary encounter diagnosis)  Comment: appears as muscular in origin and is slowly improving per patient, would not pursue further as expect resolution  Plan:     (E11.51) Type 2 diabetes mellitus with diabetic peripheral angiopathy without gangrene, without long-term current use of insulin (H)  Comment: stable on therapy  Plan: blood glucose monitoring (ONE TOUCH ULTRASOFT)         lancets, blood glucose monitoring (ONE TOUCH         ULTRA) test strip, Hemoglobin A1c            (Z11.59) Need for hepatitis C screening test  Comment: screen ordered  Plan: Hepatitis C antibody        See Patient Instructions    Allan Casey MD  [facility]    25 minutes spent with this patient, face to face, discussing treatment options for listed problems above as well as side effects of appropriate medications.  Counseling time extended beyond 50% of the clinic visit.  Medication dosing, treatment plan and follow-up were discussed. Also reviewed need for primary care testing for patient.

## 2017-08-25 ENCOUNTER — TELEPHONE (OUTPATIENT)
Dept: INTERNAL MEDICINE | Facility: CLINIC | Age: 67
End: 2017-08-25

## 2017-08-25 LAB — HCV AB SERPL QL IA: NONREACTIVE

## 2017-08-26 DIAGNOSIS — M54.2 CERVICALGIA: ICD-10-CM

## 2017-08-28 ENCOUNTER — TELEPHONE (OUTPATIENT)
Dept: NURSING | Facility: CLINIC | Age: 67
End: 2017-08-28

## 2017-08-28 ENCOUNTER — PRE VISIT (OUTPATIENT)
Dept: GASTROENTEROLOGY | Facility: CLINIC | Age: 67
End: 2017-08-28

## 2017-08-28 ENCOUNTER — OFFICE VISIT (OUTPATIENT)
Dept: PULMONOLOGY | Facility: CLINIC | Age: 67
End: 2017-08-28
Attending: INTERNAL MEDICINE

## 2017-08-28 VITALS
BODY MASS INDEX: 20.88 KG/M2 | HEART RATE: 78 BPM | DIASTOLIC BLOOD PRESSURE: 83 MMHG | HEIGHT: 67 IN | WEIGHT: 133 LBS | SYSTOLIC BLOOD PRESSURE: 135 MMHG | OXYGEN SATURATION: 96 %

## 2017-08-28 DIAGNOSIS — J41.0 SIMPLE CHRONIC BRONCHITIS (H): Primary | ICD-10-CM

## 2017-08-28 ASSESSMENT — PAIN SCALES - GENERAL: PAINLEVEL: NO PAIN (0)

## 2017-08-28 NOTE — TELEPHONE ENCOUNTER
1.  Date/reason for appt: 9/8/17 10AM Esophageal thickening, needs to be stretched, having trouble swallowing,   2.  Referring provider: ARBEN CASEY MD   3.  Call to patient (Yes / No - short description): No, pt was referred.  4.  Previous care at / records requested from:  [facility] Ofelia Casey MD -- Recs are in Epic / Images in PACS   Upper GI Endoscopy 1/18/17, 9/23/16 9/6/16  Colonoscopy 8/25/14

## 2017-08-28 NOTE — PROGRESS NOTES
CHIEF COMPLAINT:  Chronic bronchitis.      HISTORY OF PRESENT ILLNESS:  The patient is a 67-year-old woman here for ongoing followup of chronic bronchitis.  She has been on umeclidinium.  Her last office visit with me was in 01/2017.  Today, she says that she is continuing to have shortness of breath with minimal activities, such as transferring.  She also has a cough.  She thinks her cough frequency is increased.  She describes her cough as heavy and thick with clear mucus.  No foul taste.  She reports that midday is the worst time for her cough, but she also coughs at night.  She does report night sweats, and says that she even has to change her clothes because of the sweating.  This has been going on for many years.  She says that in general throughout the day, when most people are normal temperature, she feels very warm.  She has been using her nebulizer 4 times a day, and finds it helpful with her shortness of breath.  She has not been having any sinus problems, and no postnasal drainage.  With regard to the thick mucus, she has not tried anything to help loosen it.  She does not have an Aerobika device at home, and has not been using anything for assistance with expectoration.      PAST MEDICAL HISTORY:   1.  Coronary artery disease, status post MI in 09/2016.   2.  Peripheral arterial disease with bilateral AKA.   3.  History of CVA x3 from 2194-4437.   4.  Heart failure with reduced ejection fraction.   5.  Legal blindness.   6.  Seizure disorder.   7.  Sickle trait.   8.  Transgender.   9.  History of DU.   10.  GERD.   11.  Chronic pain.   12.  Mild JOSE.   13.  Hypertension.   14.  Hyperlipidemia.   15.  Diabetes.      FAMILY HISTORY:  She has a brother with schizophrenia and major depression.  Sister had diabetes and depression.  Mom had diabetes and Alzheimer's.  Father had diabetes.      SOCIAL HISTORY:  The patient's significant other, Toña, passed away.  She has approximately a 75-pack-year smoking  history, and quit in 2000.  She moved from [place] to [place] in 2012 to be closer to her son who lives in [place], and her daughter who lives in [place].  She did briefly try and live with her daughter, but her family found the role reversal of her daughter caring forher stressful.  She is currently living in an assisted living facility.  She sees her son nearly every day.      REVIEW OF SYSTEMS:  A full 14-point review of systems was done and is negative, except as noted above and in the HPI.      PHYSICAL EXAMINATION:   VITAL SIGNS:  Blood pressure 135/83.  Pulse is 78.  SpO2 is 96% on room air.  BMI is 20.83.   GENERAL APPEARANCE:  She appears her stated age.  No distress.   EYES:  PERRL.  No conjunctival injection.   RESPIRATORY:  Good air movement bilaterally.  No wheezes, rales or rhonchi.   CARDIOVASCULAR:  Regular rate and rhythm, normal S1, S2.   PSYCHIATRIC:  Affect appears appropriate, although somewhat flat.      RESULTS:  Pulmonary function testing from [facility] shows normal spirometry.  Testing at our facility in 03/2015 shows normal spirometry, lung volumes, and reduced DLCO of 58% predicted.      Most recent CT done 04/2017 reviewed directly by me.  Formal impression:   1.  No pulmonary embolism.   2.  Small sliding hiatal hernia.   3.  Chronic compression deformities of T6, 7 and 8.      LABORATORY DATA:  Reviewed.  Most recent CBC shows 200 eosinophils/dL.      ASSESSMENT AND PLAN:  The patient is a 67-year-old woman here for ongoing followup of chronic bronchitis.     1.  Chronic bronchitis.  The patient has simple chronic bronchitis with daily expectoration of thick, tenacious sputum.  She has been on a long-acting muscarinic antagonist.  She does not have evidence of COPD by pulmonary function testing, and currently she has a clinical diagnosis of chronic bronchitis due to daily sputum production.  At this point, it would be reasonable to continue her current inhalers given her  degree of shortness of breath.  She is not currently having issues with hypoxia, although she has been on oxygen in the past.  She is short of breath most likely related to a combination of chronic bronchitis, deconditioning, heart failure, etc.  She is limited in her mobility due to bilateral AKAs.  Continue with supportive care as able.     2.  Previous abnormal chest imaging.  Followup CT scan with PE protocol does not show any pulmonary parenchymal abnormalities.  No further followup imaging is necessary at this time.      Follow up in 6 months.         POP WOOD MD             D: 2017 15:04   T: 2017 16:23   MT: JESUS      Name:     SHARATH MERCEDES   MRN:      [id number]        Account:      [id number]   :      1950           Service Date: 2017      Document: [id number]

## 2017-08-28 NOTE — MR AVS SNAPSHOT
"              After Visit Summary   8/28/2017    Sonya Foote    MRN: [id number]           Patient Information     Date Of Birth          1950        Visit Information        Provider Department      8/28/2017 1:30 PM Alina Jennings MD [facility]        Today's Diagnoses     Simple chronic bronchitis (H)    -  1      Care Instructions    It was nice to see you again today.    For your cough, I would like you to try to use the Aerobika device. It is a \"flutter\" valve that is supposed to help you get the mucus out of your lungs. Take a big breath, put your mouth around the mouthpiece, and blow. You will feel it vibrate down your lungs and will help you get the mucus out.    You can try over the counter Mucinex or Robitussin to loosen the mucus. You can also use the albuterol before the Aerobika to help get the mucus out.    Keep taking your incruse.    Alina Jennings           Follow-ups after your visit        Follow-up notes from your care team     Return in about 6 months (around 2/28/2018).      Your next 10 appointments already scheduled     Sep 08, 2017 10:00 AM CDT   (Arrive by 9:45 AM)   New Patient Visit with VICTOR M Floyd [facility] ([facility] [place])    [address] [address]   [phone]            Sep 27, 2017 11:15 AM CDT   Return Visit with Bj Anderson MD   [facility] ([facility])    [address]  [place] [address]   [phone]            Oct 18, 2017 12:00 PM CDT   Return Visit with Elizabeth Oliver MD   [facility] [place] ([facility] [place])    [address]  [address]  [place] [address] [phone]            Nov 02, 2017  9:15 AM CDT   RETURN GLAUCOMA with Marely Robin MD   [facility])    [address]  [address]  [place] [address]   [phone]            Nov 10, 2017  9:20 AM CST   (Arrive by 9:05 AM)   RETURN DIABETES with Michelle Irizarry MD   [facility] ([facility] [place])    [address]  [address] [place] [address]   [phone]            Feb 19, 2018  9:30 AM CST   (Arrive by 9:15 AM)   Return Visit with Alina Jennings MD   [facility] ([facility] [place])    [address]  [address] [place] [id number] [phone]              Who to contact     If you have questions or need follow up information about today's clinic visit or your schedule please contact [facility] directly at [phone].  Normal or non-critical lab and imaging results will be communicated to you by [employer], letter or phone within 4 business days after the clinic has received the results. If you do not hear from us within 7 days, please contact the clinic through [employer] or phone. If you have a critical or abnormal lab result, we will notify you by phone as soon as possible.  Submit refill requests through [employer] or call your pharmacy and they will forward the refill request to us. Please allow 3 business days for your refill to be completed.          Additional Information About Your Visit        [employer] Information     [employer] lets you send messages to your doctor, view your test results, renew your prescriptions, schedule appointments and more. To sign up, go to [email or web address] . Click on \"Log in\" on the left side of the screen, which will take you to the Welcome page. Then click on \"Sign up Now\" on the right side of the page.     You will be asked to enter the access code listed below, as well as some personal information. Please follow the directions to create your username and " "password.     Your access code is: [email or web address]  Expires: 10/17/2017  1:21 PM     Your access code will  in 90 days. If you need help or a new code, please call your [place] clinic or [phone].        Care EveryWhere ID     This is your Care EveryWhere ID. This could be used by other organizations to access your [place] medical records  [id number]        Your Vitals Were     Pulse Height Pulse Oximetry BMI (Body Mass Index)          78 1.702 m (5' 7\") 96% 20.83 kg/m2         Blood Pressure from Last 3 Encounters:   17 135/83   17 116/70   17 167/81    Weight from Last 3 Encounters:   17 60.3 kg (133 lb)   17 60.3 kg (133 lb)   17 60.3 kg (133 lb)              Today, you had the following     No orders found for display         Today's Medication Changes          These changes are accurate as of: 17  1:48 PM.  If you have any questions, ask your nurse or doctor.               These medicines have changed or have updated prescriptions.        Dose/Directions    aspirin 81 MG EC tablet   This may have changed:  when to take this   Used for:  Unstable angina (H)        Dose:  81 mg   Take 1 tablet (81 mg) by mouth daily   Quantity:  90 tablet   Refills:  3       atorvastatin 40 MG tablet   Commonly known as:  LIPITOR   This may have changed:  when to take this   Used for:  Hyperlipidemia LDL goal <100        Dose:  40 mg   Take 1 tablet (40 mg) by mouth daily   Quantity:  90 tablet   Refills:  3       hydrochlorothiazide 12.5 MG capsule   Commonly known as:  MICROZIDE   This may have changed:  additional instructions   Used for:  Essential hypertension with goal blood pressure less than 130/80        Dose:  12.5 mg   Take 1 capsule (12.5 mg) by mouth daily   Quantity:  90 capsule   Refills:  3       ondansetron 4 MG ODT tab   Commonly known as:  ZOFRAN-ODT   This may have changed:    - when to take this  - reasons to take this   Used for:  Intractable vomiting with " nausea, unspecified vomiting type        Dose:  4 mg   Take 1 tablet (4 mg) by mouth every 6 hours   Quantity:  120 tablet   Refills:  0       Phenytoin Sodium Extended 200 MG Caps   This may have changed:  additional instructions   Used for:  Seizure disorder (H)        Dose:  200 mg   Take 200 mg by mouth 2 times daily   Quantity:  60 capsule   Refills:  0                Primary Care Provider Office Phone # Fax #    Allan Casey [phone]       [address] [place] [address]        Equal Access to Services     [facility] : Hadii aad ku hadasho Soomaali, waaxda luqadaha, qaybta kaalmada adeegyada, waxay kaseyin hayjordin talisha marvin . So [facility] [phone].    ATENCIÓN: Si habla español, tiene a briones disposición servicios gratuitos de asistencia lingüística. [facility] [phone].    We comply with applicable federal civil rights laws and [place] laws. We do not discriminate on the basis of race, color, national origin, age, disability sex, sexual orientation or gender identity.            Thank you!     Thank you for choosing [facility]  for your care. Our goal is always to provide you with excellent care. Hearing back from our patients is one way we can continue to improve our services. Please take a few minutes to complete the written survey that you may receive in the mail after your visit with us. Thank you!             Your Updated Medication List - Protect others around you: Learn how to safely use, store and throw away your medicines at www.disposemymeds.org.          This list is accurate as of: 8/28/17  1:48 PM.  Always use your most recent med list.                   Brand Name Dispense Instructions for use Diagnosis    ACETAMINOPHEN PO      Take 1,000 mg by mouth every 6 hours as needed for fever Taking q4-6 hours, per MAR        ADVAIR DISKUS 250-50 MCG/DOSE diskus inhaler   Generic drug:  fluticasone-salmeterol      Inhale 1 puff  into the lungs 2 times daily        albuterol (2.5 MG/3ML) 0.083% neb solution     360 mL    INHALE 1 VIAL VIA NEBULIZER EVERY 6 HOURS AS NEEDED    Chronic obstructive pulmonary disease, unspecified COPD type (H)       aspirin 81 MG EC tablet     90 tablet    Take 1 tablet (81 mg) by mouth daily    Unstable angina (H)       atorvastatin 40 MG tablet    LIPITOR    90 tablet    Take 1 tablet (40 mg) by mouth daily    Hyperlipidemia LDL goal <100       blood glucose monitoring meter device kit     1 kit    Use to test blood sugars 3 times daily or as directed.    Type 2 diabetes, HbA1C goal < 8% (H)       blood glucose monitoring test strip    ONE TOUCH ULTRA    3 Box    Use to test blood sugars 3 times daily or as directed.    Type 2 diabetes mellitus with diabetic peripheral angiopathy without gangrene, without long-term current use of insulin (H)       calcium citrate-vitamin D 315-250 MG-UNIT Tabs per tablet    CITRACAL    120 tablet    Take 2 tablets by mouth daily    Osteoporosis       cetirizine 10 MG tablet    zyrTEC    90 tablet    Take 1 tablet (10 mg) by mouth daily as needed for allergies    Seasonal allergic rhinitis, unspecified allergic rhinitis trigger       clopidogrel 75 MG tablet    PLAVIX    90 tablet    Take 1 tablet (75 mg) by mouth daily    PAD (peripheral artery disease) (H), UGI bleed, Osteoporosis, Nausea       CYANOCOBALAMIN PO      Take 2,000 mcg by mouth daily        cyclobenzaprine 10 MG tablet    FLEXERIL    30 tablet    Take 0.5-1 tablets (5-10 mg) by mouth 3 times daily as needed for muscle spasms    Cervicalgia       diclofenac 1 % Gel topical gel    VOLTAREN    100 g    Apply 2 g topically 4 times daily to hands    Primary osteoarthritis of both hands       dorzolamide 2 % ophthalmic solution    TRUSOPT     Place 1 drop into both eyes 2 times daily        * EASY TOUCH LANCETS 30G/TWIST Misc           * blood glucose monitoring lancets     100 each    Use to test blood sugar 3 times  daily or as directed.    Type 2 diabetes mellitus with diabetic peripheral angiopathy without gangrene, without long-term current use of insulin (H)       EPINEPHrine 0.15 MG/0.3ML injection 2-pack    EPIPEN JR    0.6 mL    Inject 0.3 mLs (0.15 mg) into the muscle as needed for anaphylaxis    Bee sting reaction, undetermined intent, subsequent encounter       ESCITALOPRAM OXALATE PO      Take 10 mg by mouth daily        estradiol 0.1 MG/GM cream    ESTRACE VAGINAL    42.5 g    Use dime size amount 3x a week at night    Personal history of urinary tract infection       estradiol 1 MG tablet    ESTRACE    90 tablet    Take 1 tablet (1 mg) by mouth daily    Person who has had sex change operation       ferrous sulfate 325 (65 FE) MG tablet    IRON    60 tablet    Take 1 tablet (325 mg) by mouth 2 times daily With meals        fluticasone 50 MCG/ACT spray    FLONASE     Spray 1 spray into both nostrils daily        hydrochlorothiazide 12.5 MG capsule    MICROZIDE    90 capsule    Take 1 capsule (12.5 mg) by mouth daily    Essential hypertension with goal blood pressure less than 130/80       HYDROmorphone 2 MG tablet    DILAUDID    30 tablet    Take 1 tablet (2 mg) by mouth every 3 hours as needed for moderate to severe pain        RENÉE Pack      Take 1 packet by mouth 2 times daily        latanoprost 0.005 % ophthalmic solution    XALATAN    2.5 mL    Place 1 drop into both eyes At Bedtime    Primary open angle glaucoma, stage unspecified       levETIRAcetam 500 MG tablet    KEPPRA    180 tablet    Take 1 tablet (500 mg) by mouth 2 times daily    Nausea       lidocaine 5 % Patch    LIDODERM    30 patch    APPLY 1 TO 3 PATCHES TO PAINFUL AREA AT ONCE FOR UP TO 12 HOURS WITHIN A 24 HOUR PERIOD REMOVE AFTER 12 HOURS    Chronic pain syndrome, 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 (H)       lisinopril 10 MG tablet    PRINIVIL/ZESTRIL    90 tablet    Take 1 tablet (10 mg) by mouth daily    Essential  hypertension with goal blood pressure less than 130/80       MEDICATION GIVEN BY INTRATHECAL PUMP - INSTRUCTION      continuous May 19, 2017 - per [facility] in [place] [phone]: Conc: Bupivacaine 20 mg/mL and Fentanyl 2000 mcg/mL. Continuous: Bupivacaine 7.265 mg/day and Fentanyl 726.5 mcg/day. Boluses: Up to 7 boluses per 24-hr period of Bupivicaine 0.599 mg and Fentanyl 59.9 mcg  Pump Refill Date at [facility]: 7/2/17        metoprolol 25 MG 24 hr tablet    TOPROL-XL    30 tablet    TAKE 1 TABLET (25 MG) BY MOUTH DAILY    Old myocardial infarction       MULTIVITAMIN PO      Take 1 tablet by mouth daily        nitroGLYcerin 0.4 MG sublingual tablet    NITROSTAT    25 tablet    Place 1 tablet (0.4 mg) under the tongue every 5 minutes as needed for chest pain if you are still having symptoms after 3 doses (15 minutes) call 911.    Chronic systolic congestive heart failure (H), Old myocardial infarction       ondansetron 4 MG ODT tab    ZOFRAN-ODT    120 tablet    Take 1 tablet (4 mg) by mouth every 6 hours    Intractable vomiting with nausea, unspecified vomiting type       * order for DME     1 Month    Equipment being ordered: Depends briefs    Incontinence       * order for DME     1 Device    Equipment being ordered: Power Wheelchair    CVA (cerebral infarction), HTN (hypertension)       * order for DME     1 Units    Equipment being ordered: Nebulizer and supplies    Obstructive chronic bronchitis with exacerbation (H)       * order for DME     1 Box    Equipment being ordered: Glucerna daily shakes with each meal    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)       * order for DME     1 Device    ACcu check BID    Type 2 diabetes mellitus with diabetic peripheral angiopathy without gangrene, without long-term current use of insulin (H)       * order for DME     1 Units    Equipment being ordered: Nebulizer and tubing supplies    Simple  "chronic bronchitis (H)       * order for DME     1 Device    Equipment being ordered: CPAP supplies mask, hose, filters, cushion fax to [facility] at [phone] Disp: 10 DeviceRefills: prn Class: Local PrintStart: 2/10/2017    Chronic obstructive pulmonary disease, unspecified COPD type (H)       * order for DME     10 Device    Equipment being ordered: CPAP supplies mask, hose, filters, cushion  fax to [facility] at [phone]    COPD (chronic obstructive pulmonary disease) (H)       * order for DME     1 Device    Equipment being ordered: wheelchair seat cushion    Critical lower limb ischemia       * order for DME     1 Device    roho w/c cushion 18\" X 18\" for pressure relief    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 (H)       * order for DME     1 Device    Hospital bed with side rails    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 (H)       * order for DME     1 Device    Full electric hospital bed with half rails  Dx: T52568, I110, J449 Length of need: lifetime    Status post bilateral above knee amputation (H)       * order for DME     1 Device    Wheel Chair Cushion: 18 x 18 inch Roho cushion    Status post bilateral above knee amputation (H)       pantoprazole 40 MG EC tablet    PROTONIX     Take 40 mg by mouth daily        Phenytoin Sodium Extended 200 MG Caps     60 capsule    Take 200 mg by mouth 2 times daily    Seizure disorder (H)       polyethylene glycol Packet    MIRALAX/GLYCOLAX     Take 17 g by mouth daily Dissolved in water or juice        pregabalin 75 MG capsule    LYRICA    120 capsule    Take 2 capsules (150 mg) by mouth 2 times daily    Pain in both upper extremities       prochlorperazine 10 MG tablet    COMPAZINE    20 tablet    Take 1 tablet (10 mg) by mouth every 8 hours as needed    Nausea with vomiting       risperiDONE 0.5 MG tablet    risperDAL     Take 0.5 mg by mouth At Bedtime        sucralfate 1 GM tablet    CARAFATE    40 tablet    " Take 1 tablet (1 g) by mouth 4 times daily May dissolve in 10 mL water is necessary. (Start upon completion of carafate suspension)    Gastroesophageal reflux disease without esophagitis       traZODone 100 MG tablet    DESYREL    90 tablet    Take 1.5 tablets (150 mg) by mouth At Bedtime    Anxiety state       umeclidinium 62.5 MCG/INH oral inhaler    INCRUSE ELLIPTA    3 Inhaler    Inhale 1 puff into the lungs daily    Chronic obstructive pulmonary disease, unspecified COPD type (H)       vitamin D 2000 UNITS tablet     100 tablet    Take 2,000 Units by mouth daily.        zinc 50 MG Tabs      Take 1 tablet by mouth daily        * Notice:  This list has 15 medication(s) that are the same as other medications prescribed for you. Read the directions carefully, and ask your doctor or other care provider to review them with you.

## 2017-08-28 NOTE — PATIENT INSTRUCTIONS
"It was nice to see you again today.    For your cough, I would like you to try to use the Aerobika device. It is a \"flutter\" valve that is supposed to help you get the mucus out of your lungs. Take a big breath, put your mouth around the mouthpiece, and blow. You will feel it vibrate down your lungs and will help you get the mucus out.    You can try over the counter Mucinex or Robitussin to loosen the mucus. You can also use the albuterol before the Aerobika to help get the mucus out.    Keep taking your incruse.    Alina Jennings   "

## 2017-08-28 NOTE — LETTER
8/28/2017       RE: Sonya Foote  [address]   [facility] [address]     Dear Colleague,    Thank you for referring your patient, Sonya Foote, to the [facility] at [facility]. Please see a copy of my visit note below.    CHIEF COMPLAINT:  Chronic bronchitis.      HISTORY OF PRESENT ILLNESS:  The patient is a 67-year-old woman here for ongoing followup of chronic bronchitis.  She has been on umeclidinium.  Her last office visit with me was in 01/2017.  Today, she says that she is continuing to have shortness of breath with minimal activities, such as transferring.  She also has a cough.  She thinks her cough frequency is increased.  She describes her cough as heavy and thick with clear mucus.  No foul taste.  She reports that midday is the worst time for her cough, but she also coughs at night.  She does report night sweats, and says that she even has to change her clothes because of the sweating.  This has been going on for many years.  She says that in general throughout the day, when most people are normal temperature, she feels very warm.  She has been using her nebulizer 4 times a day, and finds it helpful with her shortness of breath.  She has not been having any sinus problems, and no postnasal drainage.  With regard to the thick mucus, she has not tried anything to help loosen it.  She does not have an Aerobika device at home, and has not been using anything for assistance with expectoration.      PAST MEDICAL HISTORY:   1.  Coronary artery disease, status post MI in 09/2016.   2.  Peripheral arterial disease with bilateral AKA.   3.  History of CVA x3 from 3790-8530.   4.  Heart failure with reduced ejection fraction.   5.  Legal blindness.   6.  Seizure disorder.   7.  Sickle trait.   8.  Transgender.   9.  History of DU.   10.  GERD.   11.  Chronic pain.   12.  Mild JOSE.   13.  Hypertension.   14.  Hyperlipidemia.   15.   Diabetes.      FAMILY HISTORY:  She has a brother with schizophrenia and major depression.  Sister had diabetes and depression.  Mom had diabetes and Alzheimer's.  Father had diabetes.      SOCIAL HISTORY:  The patient's significant other, Toña, passed away.  She has approximately a 75-pack-year smoking history, and quit in 2000.  She moved from [place] to [place] in 2012 to be closer to her son who lives in [place], and her daughter who lives in [place].  She did briefly try and live with her daughter, but her family found the role reversal of her daughter caring forher stressful.  She is currently living in an assisted living facility.  She sees her son nearly every day.      REVIEW OF SYSTEMS:  A full 14-point review of systems was done and is negative, except as noted above and in the HPI.      PHYSICAL EXAMINATION:   VITAL SIGNS:  Blood pressure 135/83.  Pulse is 78.  SpO2 is 96% on room air.  BMI is 20.83.   GENERAL APPEARANCE:  She appears her stated age.  No distress.   EYES:  PERRL.  No conjunctival injection.   RESPIRATORY:  Good air movement bilaterally.  No wheezes, rales or rhonchi.   CARDIOVASCULAR:  Regular rate and rhythm, normal S1, S2.   PSYCHIATRIC:  Affect appears appropriate, although somewhat flat.      RESULTS:  Pulmonary function testing from [facility] shows normal spirometry.  Testing at our facility in 03/2015 shows normal spirometry, lung volumes, and reduced DLCO of 58% predicted.      Most recent CT done 04/2017 reviewed directly by me.  Formal impression:   1.  No pulmonary embolism.   2.  Small sliding hiatal hernia.   3.  Chronic compression deformities of T6, 7 and 8.      LABORATORY DATA:  Reviewed.  Most recent CBC shows 200 eosinophils/dL.      ASSESSMENT AND PLAN:  The patient is a 67-year-old woman here for ongoing followup of chronic bronchitis.     1.  Chronic bronchitis.  The patient has simple chronic bronchitis with daily expectoration of thick, tenacious  sputum.  She has been on a long-acting muscarinic antagonist.  She does not have evidence of COPD by pulmonary function testing, and currently she has a clinical diagnosis of chronic bronchitis due to daily sputum production.  At this point, it would be reasonable to continue her current inhalers given her degree of shortness of breath.  She is not currently having issues with hypoxia, although she has been on oxygen in the past.  She is short of breath most likely related to a combination of chronic bronchitis, deconditioning, heart failure, etc.  She is limited in her mobility due to bilateral AKAs.  Continue with supportive care as able.     2.  Previous abnormal chest imaging.  Followup CT scan with PE protocol does not show any pulmonary parenchymal abnormalities.  No further followup imaging is necessary at this time.      Follow up in 6 months.         POP WOOD MD             D: 2017 15:04   T: 2017 16:23   MT: JESUS      Name:     SHARATH MERCEDES   MRN:      [id number]        Account:      [id number]   :      1950           Service Date: 2017      Document: [id number]

## 2017-08-29 ENCOUNTER — TELEPHONE (OUTPATIENT)
Dept: INTERNAL MEDICINE | Facility: CLINIC | Age: 67
End: 2017-08-29

## 2017-08-29 RX ORDER — CYCLOBENZAPRINE HCL 10 MG
TABLET ORAL
Qty: 30 TABLET | Refills: 1 | Status: SHIPPED | OUTPATIENT
Start: 2017-08-29 | End: 2017-10-03

## 2017-08-29 NOTE — TELEPHONE ENCOUNTER
flexeril      Last Written Prescription Date:  6/15/17  Last Fill Quantity: 30,   # refills: 1  Last Office Visit with [facility] prescribing provider: 8/24/17  Future Office visit:    Next 5 appointments (look out 90 days)     Sep 27, 2017 11:15 AM CDT   Return Visit with Bj Anderson MD   [facility] ([facility])    [address] [place] [id number] [phone]                   Routing refill request to provider for review/approval because:  Drug not on the [facility] refill protocol or controlled substance

## 2017-09-06 ENCOUNTER — OFFICE VISIT (OUTPATIENT)
Dept: INTERNAL MEDICINE | Facility: CLINIC | Age: 67
End: 2017-09-06
Payer: COMMERCIAL

## 2017-09-06 VITALS
HEART RATE: 92 BPM | BODY MASS INDEX: 20.83 KG/M2 | SYSTOLIC BLOOD PRESSURE: 135 MMHG | DIASTOLIC BLOOD PRESSURE: 80 MMHG | OXYGEN SATURATION: 95 % | WEIGHT: 133 LBS | TEMPERATURE: 98.6 F

## 2017-09-06 DIAGNOSIS — Z89.511 STATUS POST BELOW KNEE AMPUTATION OF RIGHT LOWER EXTREMITY (H): ICD-10-CM

## 2017-09-06 DIAGNOSIS — G89.4 CHRONIC PAIN SYNDROME: ICD-10-CM

## 2017-09-06 PROCEDURE — 99213 OFFICE O/P EST LOW 20 MIN: CPT | Performed by: INTERNAL MEDICINE

## 2017-09-06 RX ORDER — LIDOCAINE 50 MG/G
PATCH TOPICAL
Qty: 30 PATCH | Refills: 5 | Status: SHIPPED | OUTPATIENT
Start: 2017-09-06 | End: 2018-02-13

## 2017-09-06 NOTE — MR AVS SNAPSHOT
After Visit Summary   9/6/2017    Snoya Foote    MRN: [id number]           Patient Information     Date Of Birth          1950        Visit Information        Provider Department      9/6/2017 10:00 AM Allan Casey MD [facility]        Today's Diagnoses     Chronic pain syndrome        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 (H)           Follow-ups after your visit        Follow-up notes from your care team     Return if symptoms worsen or fail to improve.      Your next 10 appointments already scheduled     Sep 06, 2017 10:00 AM CDT   [facility]    [address] [id number] [phone]           Bring a current list of meds and any records pertaining to this visit. For Physicals, please bring immunization records and any forms needing to be filled out. Please arrive 10 minutes early to complete paperwork.            Sep 08, 2017 10:00 AM CDT   (Arrive by 9:45 AM)   New Patient Visit with VICTOR M Floyd [facility] ([facility])    [address]  [address] [facility] [address]   [phone]            Sep 27, 2017 11:15 AM CDT   Return Visit with Bj Anderson MD   [facility] [place] ([facility])    [address]  [facility] [address]   [phone]            Oct 18, 2017 12:00 PM CDT   Return Visit with Elizabeth Oliver MD   [facility] ([facility])    [address]  [address]  [facility] [address]   [phone]            Nov 02, 2017  9:15 AM CDT   RETURN GLAUCOMA with Marely Robin MD   [facility] ([facility])    [address]  [address]  [address] [address] [place] [address]  "  [phone]            Nov 10, 2017  9:20 AM CST   (Arrive by 9:05 AM)   RETURN DIABETES with Michelle Irizarry MD   [facility] ([facility])    [address] [place] [address] [phone]            2018  9:30 AM CST   (Arrive by 9:15 AM)   Return Visit with Alina Jennings MD   [facility] ([facility])    [address] [place] [address] [phone]              Who to contact     If you have questions or need follow up information about today's clinic visit or your schedule please contact [facility] directly at [phone].  Normal or non-critical lab and imaging results will be communicated to you by MyChart, letter or phone within 4 business days after the clinic has received the results. If you do not hear from us within 7 days, please contact the clinic through MyChart or phone. If you have a critical or abnormal lab result, we will notify you by phone as soon as possible.  Submit refill requests through [employer] or call your pharmacy and they will forward the refill request to us. Please allow 3 business days for your refill to be completed.          Additional Information About Your Visit        MyChart Information     [facility] lets you send messages to your doctor, view your test results, renew your prescriptions, schedule appointments and more. To sign up, go to [email or web address] . Click on \"Log in\" on the left side of the screen, which will take you to the Welcome page. Then click on \"Sign up Now\" on the right side of the page.     You will be asked to enter the access code listed below, as well as some personal information. Please follow the directions to create your username and password.     Your access code is: [email or web address]  Expires: 10/17/2017  1:21 PM     Your access code will  in 90 " days. If you need help or a new code, please call your [place] clinic or [phone].        Care EveryWhere ID     This is your Care EveryWhere ID. This could be used by other organizations to access your [place] medical records  [id number]        Your Vitals Were     Pulse Temperature Pulse Oximetry BMI (Body Mass Index)          92 98.6  F (37  C) (Oral) 95% 20.83 kg/m2         Blood Pressure from Last 3 Encounters:   09/06/17 135/80   08/28/17 135/83   08/24/17 116/70    Weight from Last 3 Encounters:   09/06/17 133 lb (60.3 kg)   08/28/17 133 lb (60.3 kg)   08/24/17 133 lb (60.3 kg)              Today, you had the following     No orders found for display         Today's Medication Changes          These changes are accurate as of: 9/6/17  9:52 AM.  If you have any questions, ask your nurse or doctor.               These medicines have changed or have updated prescriptions.        Dose/Directions    aspirin 81 MG EC tablet   This may have changed:  when to take this   Used for:  Unstable angina (H)        Dose:  81 mg   Take 1 tablet (81 mg) by mouth daily   Quantity:  90 tablet   Refills:  3       atorvastatin 40 MG tablet   Commonly known as:  LIPITOR   This may have changed:  when to take this   Used for:  Hyperlipidemia LDL goal <100        Dose:  40 mg   Take 1 tablet (40 mg) by mouth daily   Quantity:  90 tablet   Refills:  3       hydrochlorothiazide 12.5 MG capsule   Commonly known as:  MICROZIDE   This may have changed:  additional instructions   Used for:  Essential hypertension with goal blood pressure less than 130/80        Dose:  12.5 mg   Take 1 capsule (12.5 mg) by mouth daily   Quantity:  90 capsule   Refills:  3       ondansetron 4 MG ODT tab   Commonly known as:  ZOFRAN-ODT   This may have changed:    - when to take this  - reasons to take this   Used for:  Intractable vomiting with nausea, unspecified vomiting type        Dose:  4 mg   Take 1 tablet (4 mg) by mouth every 6 hours    Quantity:  120 tablet   Refills:  0       Phenytoin Sodium Extended 200 MG Caps   This may have changed:  additional instructions   Used for:  Seizure disorder (H)        Dose:  200 mg   Take 200 mg by mouth 2 times daily   Quantity:  60 capsule   Refills:  0            Where to get your medicines      These medications were sent to [facility] [address]  [address] [address]     Phone:  [phone]     lidocaine 5 % Patch                Primary Care Provider Office Phone # Fax #    Allan Casey [phone]       [address] [facility] [address]        Equal Access to Services     [facility] VELMA : Hadii aad ku hadasho Soomaali, waaxda luqadaha, qaybta kaalmada adeegyada, waxisrael marvin . So [facility] [phone].    ATENCIÓN: Si habla español, tiene a briones disposición servicios gratuitos de asistencia lingüística. Llame al [phone].    We comply with applicable federal civil rights laws and [place] laws. We do not discriminate on the basis of race, color, national origin, age, disability sex, sexual orientation or gender identity.            Thank you!     Thank you for choosing [facility]  for your care. Our goal is always to provide you with excellent care. Hearing back from our patients is one way we can continue to improve our services. Please take a few minutes to complete the written survey that you may receive in the mail after your visit with us. Thank you!             Your Updated Medication List - Protect others around you: Learn how to safely use, store and throw away your medicines at www.disposemymeds.org.          This list is accurate as of: 9/6/17  9:52 AM.  Always use your most recent med list.                   Brand Name Dispense Instructions for use Diagnosis    ACETAMINOPHEN PO      Take 1,000 mg by mouth every 6 hours as needed for fever Taking q4-6 hours,  per MAR        ADVAIR DISKUS 250-50 MCG/DOSE diskus inhaler   Generic drug:  fluticasone-salmeterol      Inhale 1 puff into the lungs 2 times daily        albuterol (2.5 MG/3ML) 0.083% neb solution     360 mL    INHALE 1 VIAL VIA NEBULIZER EVERY 6 HOURS AS NEEDED    Chronic obstructive pulmonary disease, unspecified COPD type (H)       aspirin 81 MG EC tablet     90 tablet    Take 1 tablet (81 mg) by mouth daily    Unstable angina (H)       atorvastatin 40 MG tablet    LIPITOR    90 tablet    Take 1 tablet (40 mg) by mouth daily    Hyperlipidemia LDL goal <100       blood glucose monitoring meter device kit     1 kit    Use to test blood sugars 3 times daily or as directed.    Type 2 diabetes, HbA1C goal < 8% (H)       blood glucose monitoring test strip    ONE TOUCH ULTRA    3 Box    Use to test blood sugars 3 times daily or as directed.    Type 2 diabetes mellitus with diabetic peripheral angiopathy without gangrene, without long-term current use of insulin (H)       calcium citrate-vitamin D 315-250 MG-UNIT Tabs per tablet    CITRACAL    120 tablet    Take 2 tablets by mouth daily    Osteoporosis       cetirizine 10 MG tablet    zyrTEC    90 tablet    Take 1 tablet (10 mg) by mouth daily as needed for allergies    Seasonal allergic rhinitis, unspecified allergic rhinitis trigger       clopidogrel 75 MG tablet    PLAVIX    90 tablet    Take 1 tablet (75 mg) by mouth daily    PAD (peripheral artery disease) (H), UGI bleed, Osteoporosis, Nausea       CYANOCOBALAMIN PO      Take 2,000 mcg by mouth daily        cyclobenzaprine 10 MG tablet    FLEXERIL    30 tablet    TAKE ONE HALF TO ONE WHOLE TABLET BY MOUTH THREE TIMES A DAY AS NEEDED FOR MUSCLE SPASMS    Cervicalgia       diclofenac 1 % Gel topical gel    VOLTAREN    100 g    Apply 2 g topically 4 times daily to hands    Primary osteoarthritis of both hands       dorzolamide 2 % ophthalmic solution    TRUSOPT     Place 1 drop into both eyes 2 times daily        *  EASY TOUCH LANCETS 30G/TWIST Misc           * blood glucose monitoring lancets     100 each    Use to test blood sugar 3 times daily or as directed.    Type 2 diabetes mellitus with diabetic peripheral angiopathy without gangrene, without long-term current use of insulin (H)       EPINEPHrine 0.15 MG/0.3ML injection 2-pack    EPIPEN JR    0.6 mL    Inject 0.3 mLs (0.15 mg) into the muscle as needed for anaphylaxis    Bee sting reaction, undetermined intent, subsequent encounter       ESCITALOPRAM OXALATE PO      Take 10 mg by mouth daily        estradiol 0.1 MG/GM cream    ESTRACE VAGINAL    42.5 g    Use dime size amount 3x a week at night    Personal history of urinary tract infection       estradiol 1 MG tablet    ESTRACE    90 tablet    Take 1 tablet (1 mg) by mouth daily    Person who has had sex change operation       ferrous sulfate 325 (65 FE) MG tablet    IRON    60 tablet    Take 1 tablet (325 mg) by mouth 2 times daily With meals        fluticasone 50 MCG/ACT spray    FLONASE     Spray 1 spray into both nostrils daily        hydrochlorothiazide 12.5 MG capsule    MICROZIDE    90 capsule    Take 1 capsule (12.5 mg) by mouth daily    Essential hypertension with goal blood pressure less than 130/80       HYDROmorphone 2 MG tablet    DILAUDID    30 tablet    Take 1 tablet (2 mg) by mouth every 3 hours as needed for moderate to severe pain        RENÉE Pack      Take 1 packet by mouth 2 times daily        latanoprost 0.005 % ophthalmic solution    XALATAN    2.5 mL    Place 1 drop into both eyes At Bedtime    Primary open angle glaucoma, stage unspecified       levETIRAcetam 500 MG tablet    KEPPRA    180 tablet    Take 1 tablet (500 mg) by mouth 2 times daily    Nausea       lidocaine 5 % Patch    LIDODERM    30 patch    APPLY 1 TO 3 PATCHES TO PAINFUL AREA AT ONCE FOR UP TO 12 HOURS WITHIN A 24 HOUR PERIOD REMOVE AFTER 12 HOURS    Chronic pain syndrome, Status post below knee amputation of right lower  extremity (H)       lisinopril 10 MG tablet    PRINIVIL/ZESTRIL    90 tablet    Take 1 tablet (10 mg) by mouth daily    Essential hypertension with goal blood pressure less than 130/80       MEDICATION GIVEN BY INTRATHECAL PUMP - INSTRUCTION      continuous May 19, 2017 - per [facility] [place] [phone]: Conc: Bupivacaine 20 mg/mL and Fentanyl 2000 mcg/mL. Continuous: Bupivacaine 7.265 mg/day and Fentanyl 726.5 mcg/day. Boluses: Up to 7 boluses per 24-hr period of Bupivicaine 0.599 mg and Fentanyl 59.9 mcg  Pump Refill Date at [facility]: 7/2/17        metoprolol 25 MG 24 hr tablet    TOPROL-XL    30 tablet    TAKE 1 TABLET (25 MG) BY MOUTH DAILY    Old myocardial infarction       MULTIVITAMIN PO      Take 1 tablet by mouth daily        nitroGLYcerin 0.4 MG sublingual tablet    NITROSTAT    25 tablet    Place 1 tablet (0.4 mg) under the tongue every 5 minutes as needed for chest pain if you are still having symptoms after 3 doses (15 minutes) call 911.    Chronic systolic congestive heart failure (H), Old myocardial infarction       ondansetron 4 MG ODT tab    ZOFRAN-ODT    120 tablet    Take 1 tablet (4 mg) by mouth every 6 hours    Intractable vomiting with nausea, unspecified vomiting type       * order for DME     1 Month    Equipment being ordered: Depends briefs    Incontinence       * order for DME     1 Device    Equipment being ordered: Power Wheelchair    CVA (cerebral infarction), HTN (hypertension)       * order for DME     1 Units    Equipment being ordered: Nebulizer and supplies    Obstructive chronic bronchitis with exacerbation (H)       * order for DME     1 Box    Equipment being ordered: Glucerna daily shakes with each meal    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)       * order for DME     1 Device    ACcu check BID    Type 2 diabetes mellitus with diabetic peripheral angiopathy without gangrene, without long-term  "current use of insulin (H)       * order for DME     1 Units    Equipment being ordered: Nebulizer and tubing supplies    Simple chronic bronchitis (H)       * order for DME     1 Device    Equipment being ordered: CPAP supplies mask, hose, filters, cushion fax to [facility] at [phone] Disp: 10 DeviceRefills: prn Class: Local PrintStart: 2/10/2017    Chronic obstructive pulmonary disease, unspecified COPD type (H)       * order for DME     10 Device    Equipment being ordered: CPAP supplies mask, hose, filters, cushion  fax to [facility] at [phone]    COPD (chronic obstructive pulmonary disease) (H)       * order for DME     1 Device    Equipment being ordered: wheelchair seat cushion    Critical lower limb ischemia       * order for DME     1 Device    roho w/c cushion 18\" X 18\" for pressure relief    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 (H)       * order for DME     1 Device    Hospital bed with side rails    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 (H)       * order for DME     1 Device    Full electric hospital bed with half rails  Dx: C77144, I110, J449 Length of need: lifetime    Status post bilateral above knee amputation (H)       * order for DME     1 Device    Wheel Chair Cushion: 18 x 18 inch Roho cushion    Status post bilateral above knee amputation (H)       pantoprazole 40 MG EC tablet    PROTONIX     Take 40 mg by mouth daily        Phenytoin Sodium Extended 200 MG Caps     60 capsule    Take 200 mg by mouth 2 times daily    Seizure disorder (H)       polyethylene glycol Packet    MIRALAX/GLYCOLAX     Take 17 g by mouth daily Dissolved in water or juice        pregabalin 75 MG capsule    LYRICA    120 capsule    Take 2 capsules (150 mg) by mouth 2 times daily    Pain in both upper extremities       prochlorperazine 10 MG tablet    COMPAZINE    20 tablet    Take 1 tablet (10 mg) by mouth every 8 hours as needed    Nausea with vomiting       " risperiDONE 0.5 MG tablet    risperDAL     Take 0.5 mg by mouth At Bedtime        sucralfate 1 GM tablet    CARAFATE    40 tablet    Take 1 tablet (1 g) by mouth 4 times daily May dissolve in 10 mL water is necessary. (Start upon completion of carafate suspension)    Gastroesophageal reflux disease without esophagitis       traZODone 100 MG tablet    DESYREL    90 tablet    Take 1.5 tablets (150 mg) by mouth At Bedtime    Anxiety state       umeclidinium 62.5 MCG/INH oral inhaler    INCRUSE ELLIPTA    3 Inhaler    Inhale 1 puff into the lungs daily    Chronic obstructive pulmonary disease, unspecified COPD type (H)       vitamin D 2000 UNITS tablet     100 tablet    Take 2,000 Units by mouth daily.        zinc 50 MG Tabs      Take 1 tablet by mouth daily        * Notice:  This list has 15 medication(s) that are the same as other medications prescribed for you. Read the directions carefully, and ask your doctor or other care provider to review them with you.

## 2017-09-06 NOTE — PROGRESS NOTES
SUBJECTIVE:   Sonya Foote is a 67 year old female who presents to clinic today for the following health issues:      Musculoskeletal problem/pain      Duration: 3 weeks     Description  Location: Left side rib pain    Intensity:  moderate    Accompanying signs and symptoms: pain with breathing, coughing     History  Previous similar problem: YES  Previous evaluation:  ultrasound    Precipitating or alleviating factors:  Trauma or overuse: YES- fell out of her wheel chair while in the bathroom   Aggravating factors include: Laying flat, breathing     Therapies tried and outcome: Lidocaine       Problem list and histories reviewed & adjusted, as indicated.  Additional history: as documented    Patient Active Problem List   Diagnosis     Hyperlipidemia LDL goal <100     Seizure disorder (H)     ACP (advance care planning)     Osteoporosis     Schizoaffective disorder (H)     AS (sickle cell trait) (H)     Vertigo     Person who has had sex change operation     Claudication in peripheral vascular disease (H)     Intestinal malabsorption     GIB (gastrointestinal bleeding)     Cervicalgia     Health Care Home     Asthma     Adjustment disorder with depressed mood     Chronic pain syndrome     Open-angle glaucoma     Hx of colonic polyp     Old myocardial infarction     Iron deficiency anemia     Late effect of stroke     Degeneration of intervertebral disc of lumbosacral region     Thoracic or lumbosacral neuritis or radiculitis     Cerebral infarction due to occlusion or stenosis of carotid artery     Disorder of bone and cartilage     Hereditary and idiopathic peripheral neuropathy     Androgen insensitivity syndrome     PAD (peripheral artery disease) (H)     Chronic systolic congestive heart failure (H)     Stenosis of carotid artery     Osteoarthritis     Pain in both upper extremities     Atherosclerotic peripheral vascular disease with rest pain (H)     Essential hypertension     Cellulitis of right ankle      Angina pectoris, crescendo (H)     Type 2 diabetes mellitus with diabetic peripheral angiopathy without gangrene, without long-term current use of insulin (H)     Anemia, unspecified type     Critical lower limb ischemia     Testicular feminization     Anxiety disorder due to general medical condition     Central retinal artery occlusion     Lumbosacral radiculitis     Peripheral neuropathy (H)     Osteopenia     Prediabetes     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 (H)     Primary open angle glaucoma of both eyes, severe stage     Pseudophakia of right eye     Cataract, left eye     Diabetes mellitus type 2 without retinopathy (H)     Pyelonephritis     Chronic obstructive pulmonary disease, unspecified COPD type (H)     JOSE (obstructive sleep apnea)     Complex sleep apnea syndrome     Coronary artery disease of native artery of native heart with stable angina pectoris (H)     Hyperlipidemia     Ischemic cardiomyopathy     Bee sting reaction, undetermined intent, subsequent encounter     Functional incontinence     Personal history of urinary tract infection     Dysphagia     Single seizure (H)     Past Surgical History:   Procedure Laterality Date     AMPUTATE LEG ABOVE KNEE Left 6/11/2016    Procedure: AMPUTATE LEG ABOVE KNEE;  Surgeon: Mello Rodriguez MD;  [facility]     AMPUTATE LEG BELOW KNEE Right 11/7/2016    Procedure: AMPUTATE LEG BELOW KNEE;  Surgeon: Savannah Durant MD;  [facility]     AMPUTATE REVISION STUMP LOWER EXTREMITY Right 11/11/2016    Procedure: AMPUTATE REVISION STUMP LOWER EXTREMITY;  Surgeon: Savannah Durant MD;  [facility]     AMPUTATE REVISION STUMP LOWER EXTREMITY Right 11/16/2016    Procedure: AMPUTATE REVISION STUMP LOWER EXTREMITY;  Surgeon: Savannah Durant MD;  [facility]     AMPUTATE TOE(S) Right 1/5/2016    Procedure: AMPUTATE TOE(S);  Surgeon: Mello Gaines DPM;  [facility]     ANGIOGRAM Bilateral 11/21/2014    Procedure:  ANGIOGRAM;  Surgeon: Savannah Durant MD;  [facility]     ANGIOGRAM Left 1/16/2015    Procedure: ANGIOGRAM;  Surgeon: Savannah Durant MD;  [facility]     ANGIOGRAM Bilateral 9/14/2015    Procedure: ANGIOGRAM;  Surgeon: Savannah Durant MD;  [facility]     ANGIOGRAM Left 10/12/2015    Procedure: ANGIOGRAM;  Surgeon: Savannah Durant MD;  [facility]     ANGIOGRAM Right 6/6/2016    Procedure: ANGIOGRAM;  Surgeon: Savannah Durant MD;  [facility]     ANGIOPLASTY Right 6/6/2016    Procedure: ANGIOPLASTY;  Surgeon: Savannah Durant MD;  [facility]     APPENDECTOMY       BREAST SURGERY      right breast bx (benign)     CHOLECYSTECTOMY       COLONOSCOPY N/A 8/25/2014    Procedure: COLONOSCOPY;  Surgeon: Mello Ferrer MD;  [facility]     COLONOSCOPY WITH CO2 INSUFFLATION N/A 8/20/2014    Procedure: COLONOSCOPY WITH CO2 INSUFFLATION;  Surgeon: Duane, William Charles, MD;  [facility]     ENDARTERECTOMY FEMORAL  5/23/2014    Procedure: ENDARTERECTOMY FEMORAL;  Surgeon: Jason Joshi MD;  [facility]     ESOPHAGOSCOPY, GASTROSCOPY, DUODENOSCOPY (EGD), COMBINED  12/14/2012    Procedure: COMBINED ESOPHAGOSCOPY, GASTROSCOPY, DUODENOSCOPY (EGD), BIOPSY SINGLE OR MULTIPLE;  ESOPHAGOSCOPY, GASTROSCOPY, DUODENOSCOPY (EGD), DILATATION ;  Surgeon: Elizabeth Stevenson MD;  Location:  GI     ESOPHAGOSCOPY, GASTROSCOPY, DUODENOSCOPY (EGD), COMBINED  12/31/2013    Procedure: COMBINED ESOPHAGOSCOPY, GASTROSCOPY, DUODENOSCOPY (EGD), BIOPSY SINGLE OR MULTIPLE;;  Surgeon: Clemente Lopez MD;  [facility]     ESOPHAGOSCOPY, GASTROSCOPY, DUODENOSCOPY (EGD), COMBINED  4/1/2014    Procedure: COMBINED ESOPHAGOSCOPY, GASTROSCOPY, DUODENOSCOPY (EGD);;  Surgeon: Clemente Lopez MD;  [facility]     ESOPHAGOSCOPY, GASTROSCOPY, DUODENOSCOPY (EGD), COMBINED  6/28/2014    Procedure: COMBINED ESOPHAGOSCOPY, GASTROSCOPY, DUODENOSCOPY (EGD);  Surgeon: Clemente Lopez MD;  Location:  GI     ESOPHAGOSCOPY,  GASTROSCOPY, DUODENOSCOPY (EGD), COMBINED N/A 8/20/2014    Procedure: COMBINED ESOPHAGOSCOPY, GASTROSCOPY, DUODENOSCOPY (EGD), BIOPSY SINGLE OR MULTIPLE;  Surgeon: Duane, William Charles, MD;  [facility]     ESOPHAGOSCOPY, GASTROSCOPY, DUODENOSCOPY (EGD), COMBINED N/A 8/22/2014    Procedure: COMBINED ESOPHAGOSCOPY, GASTROSCOPY, DUODENOSCOPY (EGD), BIOPSY SINGLE OR MULTIPLE;  Surgeon: Mello Ferrer MD;  [facility]     ESOPHAGOSCOPY, GASTROSCOPY, DUODENOSCOPY (EGD), COMBINED N/A 10/2/2014    Procedure: COMBINED ESOPHAGOSCOPY, GASTROSCOPY, DUODENOSCOPY (EGD), BIOPSY SINGLE OR MULTIPLE;  Surgeon: Remy Haskins MD;  [facility]     ESOPHAGOSCOPY, GASTROSCOPY, DUODENOSCOPY (EGD), COMBINED Left 12/15/2014    Procedure: COMBINED ESOPHAGOSCOPY, GASTROSCOPY, DUODENOSCOPY (EGD), BIOPSY SINGLE OR MULTIPLE;  Surgeon: Remy Haskins MD;  [facility]     ESOPHAGOSCOPY, GASTROSCOPY, DUODENOSCOPY (EGD), COMBINED N/A 2/25/2015    Procedure: COMBINED ENDOSCOPIC ULTRASOUND, ESOPHAGOSCOPY, GASTROSCOPY, DUODENOSCOPY (EGD), FINE NEEDLE ASPIRATE/BIOPSY;  Surgeon: Clemente Lugo MD;  [facility]     ESOPHAGOSCOPY, GASTROSCOPY, DUODENOSCOPY (EGD), COMBINED Left 2/25/2015    Procedure: COMBINED ESOPHAGOSCOPY, GASTROSCOPY, DUODENOSCOPY (EGD), BIOPSY SINGLE OR MULTIPLE;  Surgeon: Clemente Lugo MD;  [facility]     ESOPHAGOSCOPY, GASTROSCOPY, DUODENOSCOPY (EGD), COMBINED N/A 9/25/2016    Procedure: COMBINED ESOPHAGOSCOPY, GASTROSCOPY, DUODENOSCOPY (EGD);  Surgeon: Aziza Patiño MD;  [facility]     ESOPHAGOSCOPY, GASTROSCOPY, DUODENOSCOPY (EGD), COMBINED N/A 1/18/2017    Procedure: COMBINED ESOPHAGOSCOPY, GASTROSCOPY, DUODENOSCOPY (EGD), BIOPSY SINGLE OR MULTIPLE;  Surgeon: Clemente Lopez MD;  [facility]     FASCIOTOMY LOWER EXTREMITY Left 6/10/2016    Procedure: FASCIOTOMY LOWER EXTREMITY;  Surgeon: Mello Rodriguez MD;  [facility]      CAPSULE ENDOSCOPY N/A  8/25/2014    Procedure: CAPSULE/PILL CAM ENDOSCOPY;  Surgeon: Remy Haskins MD;  Location:  [facility]     HC CAPSULE ENDOSCOPY N/A 10/2/2014    Procedure: CAPSULE/PILL CAM ENDOSCOPY;  Surgeon: Remy Haskins MD;  Location:  [facility]     ORTHOPEDIC SURGERY      broken wrist repair     SEX TRANSFORMATION SURGERY, MALE TO FEMALE      1974     SINUS SURGERY      cyst removed     TONSILLECTOMY       VASCULAR SURGERY      Left carotid stent       Social History   Substance Use Topics     Smoking status: Former Smoker     Packs/day: 2.50     Years: 30.00     Types: Cigarettes, Cigars     Quit date: 11/1/2000     Smokeless tobacco: Never Used     Alcohol use No     Family History   Problem Relation Age of Onset     Dementia Mother      Glaucoma Mother      DIABETES Mother      Coronary Artery Disease Mother      MI     Glaucoma Father      DIABETES Father      Heart Failure Father      CANCER Maternal Aunt      leukemia     Schizophrenia Brother      Depression Brother      Suicide Sister      Depression Sister      DIABETES Sister      CANCER Maternal Aunt      ovarian     Glaucoma Maternal Grandmother      DIABETES Maternal Grandmother      Glaucoma Maternal Grandfather      DIABETES Maternal Grandfather      Glaucoma Paternal Grandmother      DIABETES Paternal Grandmother      Glaucoma Paternal Grandfather      DIABETES Paternal Grandfather      Breast Cancer Sister      CEREBROVASCULAR DISEASE Brother          Current Outpatient Prescriptions   Medication Sig Dispense Refill     lidocaine (LIDODERM) 5 % Patch APPLY 1 TO 3 PATCHES TO PAINFUL AREA AT ONCE FOR UP TO 12 HOURS WITHIN A 24 HOUR PERIOD REMOVE AFTER 12 HOURS 30 patch 5     cyclobenzaprine (FLEXERIL) 10 MG tablet TAKE ONE HALF TO ONE WHOLE TABLET BY MOUTH THREE TIMES A DAY AS NEEDED FOR MUSCLE SPASMS 30 tablet 1     blood glucose monitoring (ONE TOUCH ULTRASOFT) lancets Use to test blood sugar 3 times daily or as directed. 100 each PRN     blood glucose  "monitoring (ONE TOUCH ULTRA) test strip Use to test blood sugars 3 times daily or as directed. 3 Box 3     estradiol (ESTRACE VAGINAL) 0.1 MG/GM cream Use dime size amount 3x a week at night 42.5 g 3     metoprolol (TOPROL-XL) 25 MG 24 hr tablet TAKE 1 TABLET (25 MG) BY MOUTH DAILY 30 tablet 10     traZODone (DESYREL) 100 MG tablet Take 1.5 tablets (150 mg) by mouth At Bedtime 90 tablet 3     order for DME Full electric hospital bed with half rails    Dx: I61375, I110, J449  Length of need: lifetime 1 Device 0     order for DME Wheel Chair Cushion: 18 x 18 inch Roho cushion 1 Device 0     umeclidinium (INCRUSE ELLIPTA) 62.5 MCG/INH oral inhaler Inhale 1 puff into the lungs daily 3 Inhaler 3     order for DME roho w/c cushion 18\" X 18\" for pressure relief 1 Device 0     order for [facility] bed with side rails 1 Device 0     polyethylene glycol (MIRALAX/GLYCOLAX) Packet Take 17 g by mouth daily Dissolved in water or juice       ESCITALOPRAM OXALATE PO Take 10 mg by mouth daily       ACETAMINOPHEN PO Take 1,000 mg by mouth every 6 hours as needed for fever Taking q4-6 hours, per MAR       pregabalin (LYRICA) 75 MG capsule Take 2 capsules (150 mg) by mouth 2 times daily 120 capsule 5     cetirizine (ZYRTEC) 10 MG tablet Take 1 tablet (10 mg) by mouth daily as needed for allergies 90 tablet 3     diclofenac (VOLTAREN) 1 % GEL topical gel Apply 2 g topically 4 times daily to hands 100 g 11     EPINEPHrine (EPIPEN JR) 0.15 MG/0.3ML injection Inject 0.3 mLs (0.15 mg) into the muscle as needed for anaphylaxis 0.6 mL 1     lisinopril (PRINIVIL/ZESTRIL) 10 MG tablet Take 1 tablet (10 mg) by mouth daily 90 tablet 3     pantoprazole (PROTONIX) 40 MG EC tablet Take 40 mg by mouth daily       risperiDONE (RISPERDAL) 0.5 MG tablet Take 0.5 mg by mouth At Bedtime       ferrous sulfate (IRON) 325 (65 FE) MG tablet Take 1 tablet (325 mg) by mouth 2 times daily With meals 60 tablet 2     order for DME Equipment being ordered: " wheelchair seat cushion 1 Device 0     order for DME Equipment being ordered: CPAP supplies mask, hose, filters, cushion    fax to [facility] at [phone] 10 Device prn     sucralfate (CARAFATE) 1 GM tablet Take 1 tablet (1 g) by mouth 4 times daily May dissolve in 10 mL water is necessary. (Start upon completion of carafate suspension) 40 tablet 5     order for DME Equipment being ordered: CPAP supplies mask, hose, filters, cushion fax to [facility] at [phone]  Disp: 10 Device Refills: prn   Class: Local Print Start: 2/10/2017 1 Device 0     order for DME Equipment being ordered: Nebulizer and tubing supplies 1 Units 0     albuterol (2.5 MG/3ML) 0.083% neb solution INHALE 1 VIAL VIA NEBULIZER EVERY 6 HOURS AS NEEDED 360 mL 11     order for DME [facility] check BID 1 Device 0     ondansetron (ZOFRAN-ODT) 4 MG ODT tab Take 1 tablet (4 mg) by mouth every 6 hours (Patient taking differently: Take 4 mg by mouth every 6 hours as needed ) 120 tablet 0     CYANOCOBALAMIN PO Take 2,000 mcg by mouth daily       Nutritional Supplements (RENÉE) PACK Take 1 packet by mouth 2 times daily       HYDROmorphone (DILAUDID) 2 MG tablet Take 1 tablet (2 mg) by mouth every 3 hours as needed for moderate to severe pain 30 tablet 0     fluticasone (FLONASE) 50 MCG/ACT nasal spray Spray 1 spray into both nostrils daily       ADVAIR DISKUS 250-50 MCG/DOSE diskus inhaler Inhale 1 puff into the lungs 2 times daily        calcium citrate-vitamin D (CITRACAL) 315-250 MG-UNIT TABS Take 2 tablets by mouth daily 120 tablet 5     hydrochlorothiazide (MICROZIDE) 12.5 MG capsule Take 1 capsule (12.5 mg) by mouth daily (Patient taking differently: Take 12.5 mg by mouth daily Hold for sbp<90) 90 capsule 3     estradiol (ESTRACE) 1 MG tablet Take 1 tablet (1 mg) by mouth daily 90 tablet 3     levETIRAcetam (KEPPRA) 500 MG tablet Take 1 tablet (500 mg) by mouth 2 times daily 180 tablet 1     aspirin EC 81 MG EC tablet Take 1 tablet (81 mg) by  mouth daily (Patient taking differently: Take 81 mg by mouth every morning ) 90 tablet 3     clopidogrel (PLAVIX) 75 MG tablet Take 1 tablet (75 mg) by mouth daily 90 tablet 3     blood glucose monitoring (ONE TOUCH ULTRA 2) meter device kit Use to test blood sugars 3 times daily or as directed. 1 kit 0     phenytoin 200 MG CAPS Take 200 mg by mouth 2 times daily (Patient taking differently: Take 200 mg by mouth 2 times daily (takes at 8 AM and 8 PM)) 60 capsule 0     dorzolamide (TRUSOPT) 2 % ophthalmic solution Place 1 drop into both eyes 2 times daily        zinc 50 MG TABS Take 1 tablet by mouth daily       nitroglycerin (NITROSTAT) 0.4 MG SL tablet Place 1 tablet (0.4 mg) under the tongue every 5 minutes as needed for chest pain if you are still having symptoms after 3 doses (15 minutes) call 911. 25 tablet 1     MEDICATION GIVEN BY INTRATHECAL PUMP - INSTRUCTION continuous May 19, 2017 - per [facility] in [place] [phone]:  Conc: Bupivacaine 20 mg/mL and Fentanyl 2000 mcg/mL.  Continuous: Bupivacaine 7.265 mg/day and Fentanyl 726.5 mcg/day.  Boluses: Up to 7 boluses per 24-hr period of Bupivicaine 0.599 mg and Fentanyl 59.9 mcg    Pump Refill Date at [facility]: 7/2/17       latanoprost (XALATAN) 0.005 % ophthalmic solution Place 1 drop into both eyes At Bedtime 2.5 mL 11     atorvastatin (LIPITOR) 40 MG tablet Take 1 tablet (40 mg) by mouth daily (Patient taking differently: Take 40 mg by mouth At Bedtime ) 90 tablet 3     order for DME Equipment being ordered: Glucerna daily shakes with each meal 1 Box 11     prochlorperazine (COMPAZINE) 10 MG tablet Take 1 tablet (10 mg) by mouth every 8 hours as needed 20 tablet 0     ORDER FOR DME Equipment being ordered: Nebulizer and supplies 1 Units 0     ORDER FOR DME Equipment being ordered: Power Wheelchair 1 Device 0     ORDER FOR DME Equipment being ordered: Depends briefs 1 Month 11     EASY TOUCH LANCETS 30G/TWIST MISC         Cholecalciferol (VITAMIN D) 2000 UNITS tablet Take 2,000 Units by mouth daily. 100 tablet 3     Multiple Vitamin (MULTIVITAMIN OR) Take 1 tablet by mouth daily        Allergies   Allergen Reactions     Bee Venom      Penicillins Anaphylaxis     Other reaction(s): Skin Rash and/or Hives     Dilantin [Phenytoin] Other (See Comments)     Generic dilantin only per pt     Iodide Hives     09/11/15: Pt states she can use iodine and doesn't have any problems      Iodine-131      Novocaine [Procaine] Hives     Other reaction(s): Skin Rash and/or Hives     BP Readings from Last 3 Encounters:   09/06/17 155/90   08/28/17 135/83   08/24/17 116/70    Wt Readings from Last 3 Encounters:   09/06/17 133 lb (60.3 kg)   08/28/17 133 lb (60.3 kg)   08/24/17 133 lb (60.3 kg)            Reviewed and updated as needed this visit by clinical staffTobacco  Allergies  Med Hx  Surg Hx  Fam Hx  Soc Hx      Reviewed and updated as needed this visit by Provider         ROS:  C: NEGATIVE for fever, chills, change in weight  E/M: NEGATIVE for ear, mouth and throat problems  R: NEGATIVE for significant cough or SOB  CV: NEGATIVE for chest pain, palpitations or peripheral edema  : NEGATIVE for frequency, dysuria, or hematuria  M: NEGATIVE for significant arthralgias or myalgia  H: NEGATIVE for bleeding problems  P: NEGATIVE for changes in mood or affect    OBJECTIVE:                                                    /80  Pulse 92  Temp 98.6  F (37  C) (Oral)  Wt 133 lb (60.3 kg)  SpO2 95%  BMI 20.83 kg/m2  Body mass index is 20.83 kg/(m^2).  GENERAL:  alert and no distress  RESP: lungs clear to auscultation - no rales, no rhonchi, no wheezes  CV: regular rates and rhythm, normal S1 S2, no S3 or S4 and no click or rub   MS: noted bilateral amputations  PSYCH: Alert and oriented times 3; speech- coherent , normal rate and volume; able to articulate logical thoughts, able to abstract reason, no tangential thoughts, no  hallucinations or delusions, affect- normal       ASSESSMENT/PLAN:                                                    1. Chronic pain syndrome  refilled  - lidocaine (LIDODERM) 5 % Patch; APPLY 1 TO 3 PATCHES TO PAINFUL AREA AT ONCE FOR UP TO 12 HOURS WITHIN A 24 HOUR PERIOD REMOVE AFTER 12 HOURS  Dispense: 30 patch; Refill: 5    2. 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 (H)  stable  - lidocaine (LIDODERM) 5 % Patch; APPLY 1 TO 3 PATCHES TO PAINFUL AREA AT ONCE FOR UP TO 12 HOURS WITHIN A 24 HOUR PERIOD REMOVE AFTER 12 HOURS  Dispense: 30 patch; Refill: 5      See Patient Instructions    Allan Casey MD  [facility]

## 2017-09-06 NOTE — NURSING NOTE
"Chief Complaint   Patient presents with     Rib Pain       Initial /90  Pulse 92  Temp 98.6  F (37  C) (Oral)  Wt 133 lb (60.3 kg)  SpO2 95%  BMI 20.83 kg/m2 Estimated body mass index is 20.83 kg/(m^2) as calculated from the following:    Height as of 8/28/17: 5' 7\" (1.702 m).    Weight as of this encounter: 133 lb (60.3 kg).  Medication Reconciliation: complete   Daniela Hancock CMA      "

## 2017-09-08 ENCOUNTER — OFFICE VISIT (OUTPATIENT)
Dept: GASTROENTEROLOGY | Facility: CLINIC | Age: 67
End: 2017-09-08

## 2017-09-08 DIAGNOSIS — R68.81 EARLY SATIETY: ICD-10-CM

## 2017-09-08 DIAGNOSIS — E11.51 TYPE 2 DIABETES MELLITUS WITH DIABETIC PERIPHERAL ANGIOPATHY WITHOUT GANGRENE, WITHOUT LONG-TERM CURRENT USE OF INSULIN (H): ICD-10-CM

## 2017-09-08 DIAGNOSIS — R13.19 ESOPHAGEAL DYSPHAGIA: ICD-10-CM

## 2017-09-08 DIAGNOSIS — R13.12 OROPHARYNGEAL DYSPHAGIA: ICD-10-CM

## 2017-09-08 DIAGNOSIS — K21.00 GASTROESOPHAGEAL REFLUX DISEASE WITH ESOPHAGITIS: Primary | ICD-10-CM

## 2017-09-08 RX ORDER — PREGABALIN 100 MG
CAPSULE ORAL
COMMUNITY
Start: 2017-06-28 | End: 2017-11-06 | Stop reason: ALTCHOICE

## 2017-09-08 RX ORDER — ROPINIROLE 0.25 MG/1
TABLET, FILM COATED ORAL
COMMUNITY
Start: 2017-05-02 | End: 2017-09-12

## 2017-09-08 RX ORDER — TIOTROPIUM BROMIDE 18 UG/1
CAPSULE ORAL; RESPIRATORY (INHALATION)
Status: ON HOLD | COMMUNITY
Start: 2017-03-09 | End: 2017-10-10

## 2017-09-08 RX ORDER — TAMSULOSIN HYDROCHLORIDE 0.4 MG/1
CAPSULE ORAL
COMMUNITY
Start: 2017-04-25 | End: 2017-09-12

## 2017-09-08 RX ORDER — TRAZODONE HYDROCHLORIDE 50 MG/1
TABLET, FILM COATED ORAL
COMMUNITY
Start: 2017-07-25 | End: 2017-11-06 | Stop reason: ALTCHOICE

## 2017-09-08 RX ORDER — SUCRALFATE 1 G/10ML
SUSPENSION ORAL
COMMUNITY
Start: 2017-03-08 | End: 2017-09-08

## 2017-09-08 NOTE — NURSING NOTE
Chief Complaint   Patient presents with     Consult     Dysphagia, choking       Vitals:    09/08/17 0939   BP: 113/68   Pulse: 86   SpO2: 96%   Weight: 62.6 kg (138 lb)       Body mass index is 21.61 kg/(m^2).

## 2017-09-08 NOTE — LETTER
9/8/2017       RE: Sonya Foote  [address]   [place] [place] [address]     Dear Colleague,    Thank you for referring your patient, Sonya Foote, to the [facility] IBD [facility]IN[facility] at [facility]. Please see a copy of my visit note below.    Chief complaint: Dysphagia     History of present illness: Sonya is a 67-year-old woman with coronary artery disease post-stenting, COPD, peripheral vascular disease and DM type II post-AKA/BKA bilateral, schizoaffective disorder, chronic pain syndrome, chronic dysphagia and GERD. Sonya has had endoscopy 11 times beginning December 2012 and most recently January 2017. She has also had capsule endoscopy with endoscopic placement as well as EUS. EUS confirmed mucinous retention cyst in the duodenum. Capsule endoscopy confirmed angiectasia in the jejunum. Additional findings have been variously candida esophagitis, reflux esophagitis, clear pitting scar tissue in the esophagus, obvious signs of esophageal dysmotility, hiatal hernia, gastritis. She had dilation of the esophagus at [facility] upper GI endoscopy on 1 occasion about 2 years ago.     Sonya comes to gastroenterology clinic due to recurrent report of dysphagia together with coughing and choking with liquids and solids. She also has complaint of weight lower than she wishes and inability to eat enough to gain. She has appetite. Peak weight has been 180, lowest weight in the last year 110 during illness. Sonya denies odynophagia, melena or hematochezia, vomiting. With her choking she sometimes needs to spit foods out. She has daily nausea for which she is using ondansetron. The Flexeril is used only a couple times a week. She is using MiraLAX only p.r.n. as she is generally using coffee to maintain bowel regularity. She reports receiving no further pain medication. She has not received liquid sucralfate for many many months and does not know whether she has  received any further sucralfate by tablet.    She is living in an assisted care living facility and is provided her medications. She arrives with no medication list with her as she reports the present [place] record as being accurate.    Medical history reviewed.  Surgical history reviewed. Includes cholecystectomy.    Medications reviewed as possible though she cannot report specifically what she is receiving.  Adverse drug reactions reviewed.    Family history negative for colon cancer. She believes some members of the family may have been type I diabetics. No other autoimmune disease found.    Social history: Reviewed and updated. She reports that her wife  this spring.    Review of systems:   ROS  Denies symptoms of acute illness or localized infection. All pertinent issues positive as in history of present illness .Sonya considers her other many conditions relatively stable.     Objective:  /68  Pulse 86  Wt 62.6 kg (138 lb)  SpO2 96%  BMI 21.61 kg/m2  She reports her weight as being 148, not 138.  Clean, occasionally groomed woman sitting in her wheelchair.  Eyes are clear.  Breathing is calm and grossly normal.  Abdomen is nontender, examined only in the chair.  Speech is fluent and logical though not entirely clear with what appears to be dry mouth and partial edentulous status.    Results:  Reviewed. Note recent decreased albumin during acute illness.  Low hemoglobin within her previous range.    CT abdomen and pelvis 13-Aug-17 without contrast essentially normal. Sigmoid diverticula, post cholecystectomy, mild hepatic pneumobilia status post sphincterotomy, no stones.    Upper GI endoscopy pathology reports have at times included candida esophagitis, most recently candidate contamination. There has been question of Arnold esophagus within the last 3 years. Stomach has shown primarily chronic gastritis with no H. pylori including on immunohistochemical stains, most recently also included iron  deposition consistent with regular oral iron intake, on occasion reactive gastropathy has also been seen. Mucinous cyst was confirmed as a benign feature.    Assessment: 67-year-old woman with esophageal dysmotility which will not improve as it incorporates scar tissue and long history of damage and esophageal dysphagia. There is no need to further characterize this that I am aware of. However, she also has choking with liquids and solids. She may find benefit with speech therapy coaching and advice.    Sonya is advised that her dysphagia in the esophagus is lifelong and that swallowing precautions for the esophagus need to be followed lifelong. These have provided in print for her. The speech therapist may have some additional suggestions.    Sonya has symptoms of early satiety and long-standing history of DM type II so may have a degree of gastroparesis. It may be helpful to know this.    Sonya complains of low weight but describes her weight today as 148 pounds at most recent check in her living facility. BMI is 22 or above in this person with bilateral AKA amputation. Weight is ample and she is advised of this.    Plan:  A gastric emptying study  A video swallow with speech pathologist at testing and in consultation and with therapy.  Continue acid reducing medication.  Follow GERD precautions.  Follow swallowing precautions.  Call as needed or asked her nurses to call.  Report any odynophagia as this may indicate return of Candida esophagitis.  Future visits should include recommendation not to snack or drink sugars between meals but rather conclude meals with clear water to decrease return of Candida esophagitis.  Return to GI clinic in 2 months.  Consult with GI dietitian, Melonie Guerrero RD, at the time of the next visit.        We discussed the above assessment and plan. Sonya is disappointed to hear that her esophageal dysphagia will be lifelong. She is also disappointed to hear that her current weight is an  advisable weight rather than on a higher weight. Print instructions were provided.    Total visit 45 minutes with counseling time 25 minutes.    Again, thank you for allowing me to participate in the care of your patient.      Sincerely,    VICTOR M Floyd CNP

## 2017-09-08 NOTE — MR AVS SNAPSHOT
After Visit Summary   9/8/2017    Sonya Foote    MRN: [id number]           Patient Information     Date Of Birth          1950        Visit Information        Provider Department      9/8/2017 10:00 AM Monserrat Polk APRN CNP [facility]        Today's Diagnoses     Gastroesophageal reflux disease with esophagitis    -  1    Esophageal dysphagia        Oropharyngeal dysphagia        Early satiety        Type 2 diabetes mellitus with diabetic peripheral angiopathy without gangrene, without long-term current use of insulin (H)          Care Instructions    1. A Gastric Emptying study is recommended.    A gastric emptying scan checks the time it takes for your stomach to empty. You will eat scrambled eggs and toast that contain a tiny amount of radioactive material. We will then take a set of pictures for the next 11/2 to 4 hours. Please allow 2 to 5 hours for this exam.    Come to the [facility] waiting room.    Preparation  Do not eat or drink after midnight before your study.  Do not use medications against nausea for 48 hours before this study.   These include ondansetron (Zofran), prochlorperazine (Compazine) and others.  If you are using metoclopramide (Reglan) or erythromycin, call GI if you do not know  whether you are to be on the medication or off of it for the study.    2.   Video swallow is in radiology with a radiologist and the speech and language pathologist present.  This studies the upper part of the swallow.   An esophagram is with this and may include swallowing bubbly and or a barium pill.    3.   Continue acid reflux (GERD)   Follow lifestyle precautions against acid reflux (GERD)    Do not overeat.   Avoid meals and snacks within three or four hours of lying down.   Avoid alcohol and mint. Decrease or quit smoking.   Do not drink carbonation - it IS acid. Decrease or quit caffeine.   If you have nighttime  "symptoms, elevate the head of the bed    first 3 inches, then 6 to 8 inches.    A foam wedge from the hip may be helpful, if a person lies on their back.    Pillows do NOT work. The entire back must be straight.   Lose weight if you are overweight.    Even ten pounds weight loss can make a difference.  Some people also have specific triggers: tomato, spice, chocolate, citrus/orange juice.      4.   Swallowing precautions for esophageal dysmotility and for after anti-reflux surgery.    (poor muscle action in the esophagus)   Take small bites.   Chew well   Moisten solid or dry food with applesauce, yogurt or other sauces.   Take sips of water between bites.   \"Swallow\" twice for each bite of food.   Wait for each bite to go down before taking the next bite.   Drink liquids slowly, a swallow at a time, rather than in continuous gulps.    More advanced actions:  A dietitian visit is recommended if a person is not able to eat usual meals.  Eat only very soft or blenderized food.  Use balanced nutritional supplements for calories if weight loss becomes an issue.    These include Prosperity Instant Breakfast, Ensure, or Boost.   Glycerna - good    5.   Your weight plenty now.  More weight now is more risk.     6.   Next time you come, let us know, doyou mainly have vomiting with yellow fluid or not?    7.   Return to GI Clinic to see the dietitian, Melonie Guerrero RD  When you return, bring your medication sheets.    8.  Call as needed, or have your nurses call.      [facility]   For appointments call Miracle, [phone]  Coordinator  [phone] Goldie or call -  GI Nurse Triage  [phone] for Medical Questions, # 3  Fax results to                     Follow-ups after your visit        Additional Services     SPEECH THERAPY REFERRAL       With video swallow/radiology, evaluate, consult, and treat                  Follow-up notes from your care team     Return in about 2 years " (around 9/8/2019).      Your next 10 appointments already scheduled     Sep 11, 2017 11:00 AM CDT   NUTRITION VISIT with SANGITA Romano [facility] ([facility])    [address]  [address] [facility] [id number] [phone]            Sep 15, 2017  8:45 AM CDT   NM GASTRIC EMPTYING UU UR MG with UUNM2   [facility] [place] ([facility])    [address] [facility] [id number] [phone]           Please bring a list of your medicines to the exam. (Include vitamins, minerals and over-the-counter drugs.) You should wear comfortable clothes. Leave your valuables at home. Please bring related prior results and films.  Tell your doctor:   If you are breastfeeding or may be pregnant.   If you have had a barium test within the past few days. Barium may change the results of certain exams.  No food or drink (including water) for 4 hours prior to the exam.  Please call your Imaging Department at your exam site with any questions.            Sep 21, 2017  1:00 PM CDT   XR VIDEO SPEECH EVALUATION WITH ESOPHAGRAM with UCXR2, UC GIGU RAD   [facility] ([facility])    [address] [place] [id number] [phone]           Please bring a list of your current medicines to your exam. (Include vitamins, minerals and over-the-counter medicines.) Leave your valuables at home.  Tell the doctor if there is a chance you could be pregnant.  Do not eat for 4 hours before the exam. Keep drinking clear liquids until 2 hours before the exam.  You may take pain medicine (with a sip of water) up to 4 hours before the exam.  Do not swallow any other medicines unless your doctor tells you to. Talk to your doctor to be sure it s safe to stop your medicines.  Please call the Imaging Department at your exam site with any questions.             Sep 21, 2017  1:00 PM CDT   Video Swallow with BRIANNA Guzmán   [facility] ([facility])    [address]  4th Floor  [facility] [id number] [phone]           Please check in for this visit in Imaging on the 1st floor.            Sep 27, 2017 11:15 AM CDT   Return Visit with Bj Anderson MD   [facility] [place] ([facility])    [address]  [facility] [id number] [phone]            Oct 18, 2017 12:00 PM CDT   Return Visit with Elizabeth Oliver MD   [facility] ([facility])    [address]  [address]  [facility] [id number] [phone]            Nov 02, 2017  9:15 AM CDT   RETURN GLAUCOMA with Marely Robin MD   [facility] ([facility])    [address]  [address]  [address] [address]  [facility] [address] [phone]            Nov 10, 2017  9:20 AM CST   (Arrive by 9:05 AM)   RETURN DIABETES with Michelle Irizarry MD   [facility] ([facility])    [address]  3rd [facility] [id number] [phone]            Dec 29, 2017 10:00 AM CST   (Arrive by 9:45 AM)   New Patient Visit with VICTOR M Floyd CNP   [facility] ([facility])    [address]  4th [facility] [id number] [phone]              Future tests that were ordered for you today     Open Future Orders        Priority Expected Expires Ordered    XR Video Swallow w Esophagram Routine 9/8/2017 9/8/2018 9/8/2017    NM Gastric Emptying Routine  9/8/2018 9/8/2017            Who to contact     Please call your clinic at [phone] to:    Ask questions about your health    Make or cancel appointments    Discuss your medicines    Learn about your test results    Speak to your doctor   If you have  compliments or concerns about an experience at your clinic, or if you wish to file a complaint, please contact [facility] Patient Relations at [phone] or email us at [email or web address]         Additional Information About Your Visit        [email or web address] Information     Aramsco is an electronic gateway that provides easy, online access to your medical records. With Aramsco, you can request a clinic appointment, read your test results, renew a prescription or communicate with your care team.     To sign up for Aramsco visit the website at [email or web address]   You will be asked to enter the access code listed below, as well as some personal information. Please follow the directions to create your username and password.     Your access code is: [email or web address]  Expires: 10/17/2017  1:21 PM     Your access code will  in 90 days. If you need help or a new code, please contact your [facility] or call [phone] for assistance.        Care EveryWhere ID     This is your Care EveryWhere ID. This could be used by other organizations to access your [place] medical records  [id number]        Your Vitals Were     Pulse Pulse Oximetry BMI (Body Mass Index)             86 96% 21.61 kg/m2          Blood Pressure from Last 3 Encounters:   17 113/68   17 135/80   17 135/83    Weight from Last 3 Encounters:   17 138 lb   17 133 lb   17 133 lb              We Performed the Following     SPEECH THERAPY REFERRAL          Today's Medication Changes          These changes are accurate as of: 17 11:41 AM.  If you have any questions, ask your nurse or doctor.               These medicines have changed or have updated prescriptions.        Dose/Directions    aspirin 81 MG EC tablet   This may have changed:  when to take this   Used for:  Unstable angina (H)        Dose:  81 mg   Take 1 tablet (81 mg) by mouth daily    Quantity:  90 tablet   Refills:  3       atorvastatin 40 MG tablet   Commonly known as:  LIPITOR   This may have changed:  when to take this   Used for:  Hyperlipidemia LDL goal <100        Dose:  40 mg   Take 1 tablet (40 mg) by mouth daily   Quantity:  90 tablet   Refills:  3       hydrochlorothiazide 12.5 MG capsule   Commonly known as:  MICROZIDE   This may have changed:  additional instructions   Used for:  Essential hypertension with goal blood pressure less than 130/80        Dose:  12.5 mg   Take 1 capsule (12.5 mg) by mouth daily   Quantity:  90 capsule   Refills:  3       ondansetron 4 MG ODT tab   Commonly known as:  ZOFRAN-ODT   This may have changed:    - when to take this  - reasons to take this   Used for:  Intractable vomiting with nausea, unspecified vomiting type        Dose:  4 mg   Take 1 tablet (4 mg) by mouth every 6 hours   Quantity:  120 tablet   Refills:  0       Phenytoin Sodium Extended 200 MG Caps   This may have changed:  additional instructions   Used for:  Seizure disorder (H)        Dose:  200 mg   Take 200 mg by mouth 2 times daily   Quantity:  60 capsule   Refills:  0                Primary Care Provider Office Phone # Fax #    Allan Casey [phone]       [address] [facility] [address]        Equal Access to Services     KARI CARREON AH: John youo Sojoe, waaxda luqadaha, qaybta kaalmada adeegyada, tonie marroquin. So [facility] [phone].    ATENCIÓN: Si habla español, tiene a briones disposición servicios gratuitos de asistencia lingüística. Tigistame al [phone].    We comply with applicable federal civil rights laws and [place] laws. We do not discriminate on the basis of race, color, national origin, age, disability sex, sexual orientation or gender identity.            Thank you!     Thank you for choosing [facility]  for your care. Our goal is always to provide you with excellent  care. Hearing back from our patients is one way we can continue to improve our services. Please take a few minutes to complete the written survey that you may receive in the mail after your visit with us. Thank you!             Your Updated Medication List - Protect others around you: Learn how to safely use, store and throw away your medicines at www.disposemymeds.org.          This list is accurate as of: 9/8/17 11:41 AM.  Always use your most recent med list.                   Brand Name Dispense Instructions for use Diagnosis    ACETAMINOPHEN PO      Take 1,000 mg by mouth every 6 hours as needed for fever Taking q4-6 hours, per MAR        ADVAIR DISKUS 250-50 MCG/DOSE diskus inhaler   Generic drug:  fluticasone-salmeterol      Inhale 1 puff into the lungs 2 times daily        albuterol (2.5 MG/3ML) 0.083% neb solution     360 mL    INHALE 1 VIAL VIA NEBULIZER EVERY 6 HOURS AS NEEDED    Chronic obstructive pulmonary disease, unspecified COPD type (H)       aspirin 81 MG EC tablet     90 tablet    Take 1 tablet (81 mg) by mouth daily    Unstable angina (H)       atorvastatin 40 MG tablet    LIPITOR    90 tablet    Take 1 tablet (40 mg) by mouth daily    Hyperlipidemia LDL goal <100       blood glucose monitoring meter device kit     1 kit    Use to test blood sugars 3 times daily or as directed.    Type 2 diabetes, HbA1C goal < 8% (H)       blood glucose monitoring test strip    ONE TOUCH ULTRA    3 Box    Use to test blood sugars 3 times daily or as directed.    Type 2 diabetes mellitus with diabetic peripheral angiopathy without gangrene, without long-term current use of insulin (H)       calcium citrate-vitamin D 315-250 MG-UNIT Tabs per tablet    CITRACAL    120 tablet    Take 2 tablets by mouth daily    Osteoporosis       cetirizine 10 MG tablet    zyrTEC    90 tablet    Take 1 tablet (10 mg) by mouth daily as needed for allergies    Seasonal allergic rhinitis, unspecified allergic rhinitis trigger        clopidogrel 75 MG tablet    PLAVIX    90 tablet    Take 1 tablet (75 mg) by mouth daily    PAD (peripheral artery disease) (H), UGI bleed, Osteoporosis, Nausea       CYANOCOBALAMIN PO      Take 2,000 mcg by mouth daily        cyclobenzaprine 10 MG tablet    FLEXERIL    30 tablet    TAKE ONE HALF TO ONE WHOLE TABLET BY MOUTH THREE TIMES A DAY AS NEEDED FOR MUSCLE SPASMS    Cervicalgia       diclofenac 1 % Gel topical gel    VOLTAREN    100 g    Apply 2 g topically 4 times daily to hands    Primary osteoarthritis of both hands       dorzolamide 2 % ophthalmic solution    TRUSOPT     Place 1 drop into both eyes 2 times daily        * EASY TOUCH LANCETS 30G/TWIST Misc           * blood glucose monitoring lancets     100 each    Use to test blood sugar 3 times daily or as directed.    Type 2 diabetes mellitus with diabetic peripheral angiopathy without gangrene, without long-term current use of insulin (H)       EPINEPHrine 0.15 MG/0.3ML injection 2-pack    EPIPEN JR    0.6 mL    Inject 0.3 mLs (0.15 mg) into the muscle as needed for anaphylaxis    Bee sting reaction, undetermined intent, subsequent encounter       ESCITALOPRAM OXALATE PO      Take 10 mg by mouth daily        estradiol 0.1 MG/GM cream    ESTRACE VAGINAL    42.5 g    Use dime size amount 3x a week at night    Personal history of urinary tract infection       estradiol 1 MG tablet    ESTRACE    90 tablet    Take 1 tablet (1 mg) by mouth daily    Person who has had sex change operation       ferrous sulfate 325 (65 FE) MG tablet    IRON    60 tablet    Take 1 tablet (325 mg) by mouth 2 times daily With meals        fluticasone 50 MCG/ACT spray    FLONASE     Spray 1 spray into both nostrils daily        hydrochlorothiazide 12.5 MG capsule    MICROZIDE    90 capsule    Take 1 capsule (12.5 mg) by mouth daily    Essential hypertension with goal blood pressure less than 130/80       RENÉE Pack      Take 1 packet by mouth 2 times daily        latanoprost 0.005  % ophthalmic solution    XALATAN    2.5 mL    Place 1 drop into both eyes At Bedtime    Primary open angle glaucoma, stage unspecified       levETIRAcetam 500 MG tablet    KEPPRA    180 tablet    Take 1 tablet (500 mg) by mouth 2 times daily    Nausea       lidocaine 5 % Patch    LIDODERM    30 patch    APPLY 1 TO 3 PATCHES TO PAINFUL AREA AT ONCE FOR UP TO 12 HOURS WITHIN A 24 HOUR PERIOD REMOVE AFTER 12 HOURS    Chronic pain syndrome, 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 (H)       lisinopril 10 MG tablet    PRINIVIL/ZESTRIL    90 tablet    Take 1 tablet (10 mg) by mouth daily    Essential hypertension with goal blood pressure less than 130/80       MEDICATION GIVEN BY INTRATHECAL PUMP - INSTRUCTION      continuous May 19, 2017 - per [facility] [place] [phone]: Conc: Bupivacaine 20 mg/mL and Fentanyl 2000 mcg/mL. Continuous: Bupivacaine 7.265 mg/day and Fentanyl 726.5 mcg/day. Boluses: Up to 7 boluses per 24-hr period of Bupivicaine 0.599 mg and Fentanyl 59.9 mcg  Pump Refill Date at [facility]: 7/2/17        metoprolol 25 MG 24 hr tablet    TOPROL-XL    30 tablet    TAKE 1 TABLET (25 MG) BY MOUTH DAILY    Old myocardial infarction       MULTIVITAMIN PO      Take 1 tablet by mouth daily        nitroGLYcerin 0.4 MG sublingual tablet    NITROSTAT    25 tablet    Place 1 tablet (0.4 mg) under the tongue every 5 minutes as needed for chest pain if you are still having symptoms after 3 doses (15 minutes) call 911.    Chronic systolic congestive heart failure (H), Old myocardial infarction       ondansetron 4 MG ODT tab    ZOFRAN-ODT    120 tablet    Take 1 tablet (4 mg) by mouth every 6 hours    Intractable vomiting with nausea, unspecified vomiting type       * order for DME     1 Month    Equipment being ordered: Depends briefs    Incontinence       * order for DME     1 Device    Equipment being ordered: Power Wheelchair    CVA (cerebral infarction), HTN  "(hypertension)       * order for DME     1 Units    Equipment being ordered: Nebulizer and supplies    Obstructive chronic bronchitis with exacerbation (H)       * order for DME     1 Box    Equipment being ordered: Glucerna daily shakes with each meal    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)       * order for DME     1 Device    ACcu check BID    Type 2 diabetes mellitus with diabetic peripheral angiopathy without gangrene, without long-term current use of insulin (H)       * order for DME     1 Units    Equipment being ordered: Nebulizer and tubing supplies    Simple chronic bronchitis (H)       * order for DME     1 Device    Equipment being ordered: CPAP supplies mask, hose, filters, cushion fax to [facility] at [phone] Disp: 10 DeviceRefills: prn Class: Local PrintStart: 2/10/2017    Chronic obstructive pulmonary disease, unspecified COPD type (H)       * order for DME     10 Device    Equipment being ordered: CPAP supplies mask, hose, filters, cushion  fax to [facility] at [phone]    COPD (chronic obstructive pulmonary disease) (H)       * order for DME     1 Device    Equipment being ordered: wheelchair seat cushion    Critical lower limb ischemia       * order for DME     1 Device    roho w/c cushion 18\" X 18\" for pressure relief    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 (H)       * order for DME     1 Device    Hospital bed with side rails    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 (H)       * order for DME     1 Device    Full electric hospital bed with half rails  Dx: G41446, I110, J449 Length of need: lifetime    Status post bilateral above knee amputation (H)       * order for DME     1 Device    Wheel Chair Cushion: 18 x 18 inch Roho cushion    Status post bilateral above knee amputation (H)       pantoprazole 40 MG EC tablet    PROTONIX     Take 40 mg by mouth daily        Phenytoin Sodium Extended 200 MG " Caps     60 capsule    Take 200 mg by mouth 2 times daily    Seizure disorder (H)       polyethylene glycol Packet    MIRALAX/GLYCOLAX     Take 17 g by mouth daily Dissolved in water or juice        * pregabalin 75 MG capsule    LYRICA    120 capsule    Take 2 capsules (150 mg) by mouth 2 times daily    Pain in both upper extremities       * LYRICA 100 MG capsule   Generic drug:  pregabalin           prochlorperazine 10 MG tablet    COMPAZINE    20 tablet    Take 1 tablet (10 mg) by mouth every 8 hours as needed    Nausea with vomiting       risperiDONE 0.5 MG tablet    risperDAL     Take 0.5 mg by mouth At Bedtime        rOPINIRole 0.25 MG tablet    REQUIP          SPIRIVA HANDIHALER 18 MCG capsule   Generic drug:  tiotropium           sucralfate 1 GM tablet    CARAFATE    40 tablet    Take 1 tablet (1 g) by mouth 4 times daily May dissolve in 10 mL water is necessary. (Start upon completion of carafate suspension)    Gastroesophageal reflux disease without esophagitis       tamsulosin 0.4 MG capsule    FLOMAX          * traZODone 100 MG tablet    DESYREL    90 tablet    Take 1.5 tablets (150 mg) by mouth At Bedtime    Anxiety state       * traZODone 50 MG tablet    DESYREL          umeclidinium 62.5 MCG/INH oral inhaler    INCRUSE ELLIPTA    3 Inhaler    Inhale 1 puff into the lungs daily    Chronic obstructive pulmonary disease, unspecified COPD type (H)       vitamin D 2000 UNITS tablet     100 tablet    Take 2,000 Units by mouth daily.        zinc 50 MG Tabs      Take 1 tablet by mouth daily        * Notice:  This list has 19 medication(s) that are the same as other medications prescribed for you. Read the directions carefully, and ask your doctor or other care provider to review them with you.

## 2017-09-08 NOTE — PATIENT INSTRUCTIONS
1. A Gastric Emptying study is recommended.    A gastric emptying scan checks the time it takes for your stomach to empty. You will eat scrambled eggs and toast that contain a tiny amount of radioactive material. We will then take a set of pictures for the next 11/2 to 4 hours. Please allow 2 to 5 hours for this exam.    Come to the [facility] waiting room.    Preparation  Do not eat or drink after midnight before your study.  Do not use medications against nausea for 48 hours before this study.   These include ondansetron (Zofran), prochlorperazine (Compazine) and others.  If you are using metoclopramide (Reglan) or erythromycin, call GI if you do not know  whether you are to be on the medication or off of it for the study.    2.   Video swallow is in radiology with a radiologist and the speech and language pathologist present.  This studies the upper part of the swallow.   An esophagram is with this and may include swallowing bubbly and or a barium pill.    3.   Continue acid reflux (GERD)   Follow lifestyle precautions against acid reflux (GERD)    Do not overeat.   Avoid meals and snacks within three or four hours of lying down.   Avoid alcohol and mint. Decrease or quit smoking.   Do not drink carbonation - it IS acid. Decrease or quit caffeine.   If you have nighttime symptoms, elevate the head of the bed    first 3 inches, then 6 to 8 inches.    A foam wedge from the hip may be helpful, if a person lies on their back.    Pillows do NOT work. The entire back must be straight.   Lose weight if you are overweight.    Even ten pounds weight loss can make a difference.  Some people also have specific triggers: tomato, spice, chocolate, citrus/orange juice.      4.   Swallowing precautions for esophageal dysmotility and for after anti-reflux surgery.    (poor muscle action in the esophagus)   Take small bites.   Chew well   Moisten solid or dry food with applesauce, yogurt or  "other sauces.   Take sips of water between bites.   \"Swallow\" twice for each bite of food.   Wait for each bite to go down before taking the next bite.   Drink liquids slowly, a swallow at a time, rather than in continuous gulps.    More advanced actions:  A dietitian visit is recommended if a person is not able to eat usual meals.  Eat only very soft or blenderized food.  Use balanced nutritional supplements for calories if weight loss becomes an issue.    These include [employer] Instant Breakfast, Ensure, or Boost.   Glycerna - good    5.   Your weight plenty now.  More weight now is more risk.     6.   Next time you come, let us know, doyou mainly have vomiting with yellow fluid or not?    7.   Return to GI Clinic to see the dietitian, Melonie Guerrero RD  When you return, bring your medication sheets.    8.  Call as needed, or have your nurses call.      [facility]   For appointments call [facility], [phone]  Coordinator  [phone] [place] or call -  GI Nurse Triage  [phone] for Medical Questions, # 3  Fax results to             "

## 2017-09-11 ENCOUNTER — ALLIED HEALTH/NURSE VISIT (OUTPATIENT)
Dept: GASTROENTEROLOGY | Facility: CLINIC | Age: 67
End: 2017-09-11

## 2017-09-11 DIAGNOSIS — R13.10 DYSPHAGIA: ICD-10-CM

## 2017-09-11 DIAGNOSIS — K21.9 ESOPHAGEAL REFLUX: ICD-10-CM

## 2017-09-11 DIAGNOSIS — E11.9 DIABETES MELLITUS, TYPE 2 (H): ICD-10-CM

## 2017-09-11 DIAGNOSIS — Z71.3 NUTRITIONAL COUNSELING: Primary | ICD-10-CM

## 2017-09-11 NOTE — PROGRESS NOTES
Chief complaint: Dysphagia     History of present illness: Sonya is a 67-year-old woman with coronary artery disease post-stenting, COPD, peripheral vascular disease and DM type II post-AKA/BKA bilateral, schizoaffective disorder, chronic pain syndrome, chronic dysphagia and GERD. Sonya has had endoscopy 11 times beginning December 2012 and most recently January 2017. She has also had capsule endoscopy with endoscopic placement as well as EUS. EUS confirmed mucinous retention cyst in the duodenum. Capsule endoscopy confirmed angiectasia in the jejunum. Additional findings have been variously candida esophagitis, reflux esophagitis, clear pitting scar tissue in the esophagus, obvious signs of esophageal dysmotility, hiatal hernia, gastritis. She had dilation of the esophagus at [facility] upper GI endoscopy on 1 occasion about 2 years ago.     Sonya comes to gastroenterology clinic due to recurrent report of dysphagia together with coughing and choking with liquids and solids. She also has complaint of weight lower than she wishes and inability to eat enough to gain. She has appetite. Peak weight has been 180, lowest weight in the last year 110 during illness. Sonya denies odynophagia, melena or hematochezia, vomiting. With her choking she sometimes needs to spit foods out. She has daily nausea for which she is using ondansetron. The Flexeril is used only a couple times a week. She is using MiraLAX only p.r.n. as she is generally using coffee to maintain bowel regularity. She reports receiving no further pain medication. She has not received liquid sucralfate for many many months and does not know whether she has received any further sucralfate by tablet.    She is living in an assisted care living facility and is provided her medications. She arrives with no medication list with her as she reports the present [place] record as being accurate.    Medical history reviewed.  Surgical history reviewed. Includes  cholecystectomy.    Medications reviewed as possible though she cannot report specifically what she is receiving.  Adverse drug reactions reviewed.    Family history negative for colon cancer. She believes some members of the family may have been type I diabetics. No other autoimmune disease found.    Social history: Reviewed and updated. She reports that her wife  this spring.    Review of systems:   LUDY  Denies symptoms of acute illness or localized infection. All pertinent issues positive as in history of present illness .Sonya considers her other many conditions relatively stable.     Objective:  /68  Pulse 86  Wt 62.6 kg (138 lb)  SpO2 96%  BMI 21.61 kg/m2  She reports her weight as being 148, not 138.  Clean, occasionally groomed woman sitting in her wheelchair.  Eyes are clear.  Breathing is calm and grossly normal.  Abdomen is nontender, examined only in the chair.  Speech is fluent and logical though not entirely clear with what appears to be dry mouth and partial edentulous status.    Results:  Reviewed. Note recent decreased albumin during acute illness.  Low hemoglobin within her previous range.    CT abdomen and pelvis 13-Aug-17 without contrast essentially normal. Sigmoid diverticula, post cholecystectomy, mild hepatic pneumobilia status post sphincterotomy, no stones.    Upper GI endoscopy pathology reports have at times included candida esophagitis, most recently candidate contamination. There has been question of Arnold esophagus within the last 3 years. Stomach has shown primarily chronic gastritis with no H. pylori including on immunohistochemical stains, most recently also included iron deposition consistent with regular oral iron intake, on occasion reactive gastropathy has also been seen. Mucinous cyst was confirmed as a benign feature.    Assessment: 67-year-old woman with esophageal dysmotility which will not improve as it incorporates scar tissue and long history of damage and  esophageal dysphagia. There is no need to further characterize this that I am aware of. However, she also has choking with liquids and solids. She may find benefit with speech therapy coaching and advice.    Sonya is advised that her dysphagia in the esophagus is lifelong and that swallowing precautions for the esophagus need to be followed lifelong. These have provided in print for her. The speech therapist may have some additional suggestions.    Sonya has symptoms of early satiety and long-standing history of DM type II so may have a degree of gastroparesis. It may be helpful to know this.    Sonya complains of low weight but describes her weight today as 148 pounds at most recent check in her living facility. BMI is 22 or above in this person with bilateral AKA amputation. Weight is ample and she is advised of this.    Plan:  A gastric emptying study  A video swallow with speech pathologist at testing and in consultation and with therapy.  Continue acid reducing medication.  Follow GERD precautions.  Follow swallowing precautions.  Call as needed or asked her nurses to call.  Report any odynophagia as this may indicate return of Candida esophagitis.  Future visits should include recommendation not to snack or drink sugars between meals but rather conclude meals with clear water to decrease return of Candida esophagitis.  Return to GI clinic in 2 months.  Consult with GI dietitian, Melonie Guerrero RD, at the time of the next visit.        We discussed the above assessment and plan. Sonya is disappointed to hear that her esophageal dysphagia will be lifelong. She is also disappointed to hear that her current weight is an advisable weight rather than on a higher weight. Print instructions were provided.    Total visit 45 minutes with counseling time 25 minutes.

## 2017-09-11 NOTE — MR AVS SNAPSHOT
After Visit Summary   9/11/2017    Sonya Foote    MRN: [id number]           Patient Information     Date Of Birth          1950        Visit Information        Provider Department      9/11/2017 11:00 AM Melonie Guerrero RD [facility]        Care Instructions    It was a pleasure a meeting you today:  -Do not skip meals. Can have 4-6 smaller meals/snacks per day which can include Glucerna as a meal/snack. Continue Glucerna shakes daily (drink at least 2 shakes per day, especially if skipping meals or unable to eat a meal).  -Decrease the amount of soda you drink each day. Recommend avoiding regular soda if possible but initially can decrease to no more than 1 can per day.  -Decrease the amount of juice you drink each day. Limit to no more than 1 cup of 100% juice per day.   -Chew food very well and eat slowly. Put fork or spoon down between bites. Swallow each bite of food before taking another bite.   If you would like to schedule a follow up visit with the Registered Dietitian. Please call [phone] to schedule.            Follow-ups after your visit        Your next 10 appointments already scheduled     Sep 15, 2017  8:45 AM CDT   NM GASTRIC EMPTYING UU UR MG with UUNM2   [facility] [place], Nuclear Medicine ([facility])    [address] [address] [phone]           Please bring a list of your medicines to the exam. (Include vitamins, minerals and over-the-counter drugs.) You should wear comfortable clothes. Leave your valuables at home. Please bring related prior results and films.  Tell your doctor:   If you are breastfeeding or may be pregnant.   If you have had a barium test within the past few days. Barium may change the results of certain exams.  No food or drink (including water) for 4 hours prior to the exam.  Please call your Imaging Department at your exam site with any questions.             Sep 21, 2017  1:00 PM CDT   XR VIDEO SPEECH EVALUATION WITH ESOPHAGRAM with UCXR2, UC GIGU RAD   [facility] ([facility] [place])    [address]  1st Floor  [place] [facility] [id number] [phone]           Please bring a list of your current medicines to your exam. (Include vitamins, minerals and over-the-counter medicines.) Leave your valuables at home.  Tell the doctor if there is a chance you could be pregnant.  Do not eat for 4 hours before the exam. Keep drinking clear liquids until 2 hours before the exam.  You may take pain medicine (with a sip of water) up to 4 hours before the exam.  Do not swallow any other medicines unless your doctor tells you to. Talk to your doctor to be sure it s safe to stop your medicines.  Please call the Imaging Department at your exam site with any questions.            Sep 21, 2017  1:00 PM CDT   Video Swallow with BRIANNA Guzmán   [facility] ([facility])    [address]  4th Floor  [facility] [id number] [phone]           Please check in for this visit in Imaging on the 1st floor.            Sep 27, 2017 11:15 AM CDT   Return Visit with Bj Anderson MD   [facility] [place] ([facility])    [address]  [facility] [facility] [address]   [phone]            Oct 18, 2017 12:00 PM CDT   Return Visit with Elizabeth Oliver MD   [facility] [place] ([facility])    [address]  [address]  [facility] [address] [phone]            Nov 02, 2017  9:15 AM CDT   RETURN GLAUCOMA with Marely Robin MD   [facility] ([facility])    [address]  [address]  [place] [facility] [address]   [phone]            Nov 10, 2017  9:20 AM CST   (Arrive by 9:05 AM)   RETURN DIABETES with Michelle Irizarry MD   [facility] (  [facility] [place])    [address]  [address] [facility] [address]   [phone]            Dec 29, 2017 10:00 AM CST   (Arrive by 9:45 AM)   New Patient Visit with VICTOR M Floyd CNP   [facility] ([facility])    [address]  [address] [facility] [address]   [phone]            2018  9:30 AM CST   (Arrive by 9:15 AM)   Return Visit with Alina Jennings MD   [facility] ([facility])    [address] [place] [address]  [address] [facility] [address] [phone]              Who to contact     Please call your clinic at [phone] to:    Ask questions about your health    Make or cancel appointments    Discuss your medicines    Learn about your test results    Speak to your doctor   If you have compliments or concerns about an experience at your clinic, or if you wish to file a complaint, please contact [facility] Relations at [phone] or email us at [email or web address]         Additional Information About Your Visit        MyChart Information     [employer] is an electronic gateway that provides easy, online access to your medical records. With [employer], you can request a clinic appointment, read your test results, renew a prescription or communicate with your care team.     To sign up for [employer] visit the website at [email or web address]   You will be asked to enter the access code listed below, as well as some personal information. Please follow the directions to create your username and password.     Your access code is: [email or web address]  Expires: 10/17/2017  1:21 PM     Your access code will  in 90 days. If you need help or a new code, please contact your [facility] or call [phone] for assistance.        Care EveryWhere ID     This is  your Care EveryWhere ID. This could be used by other organizations to access your [place] medical records  [id number]         Blood Pressure from Last 3 Encounters:   09/08/17 113/68   09/06/17 135/80   08/28/17 135/83    Weight from Last 3 Encounters:   09/08/17 62.6 kg (138 lb)   09/06/17 60.3 kg (133 lb)   08/28/17 60.3 kg (133 lb)              Today, you had the following     No orders found for display         Today's Medication Changes          These changes are accurate as of: 9/11/17 11:34 AM.  If you have any questions, ask your nurse or doctor.               These medicines have changed or have updated prescriptions.        Dose/Directions    aspirin 81 MG EC tablet   This may have changed:  when to take this   Used for:  Unstable angina (H)        Dose:  81 mg   Take 1 tablet (81 mg) by mouth daily   Quantity:  90 tablet   Refills:  3       atorvastatin 40 MG tablet   Commonly known as:  LIPITOR   This may have changed:  when to take this   Used for:  Hyperlipidemia LDL goal <100        Dose:  40 mg   Take 1 tablet (40 mg) by mouth daily   Quantity:  90 tablet   Refills:  3       hydrochlorothiazide 12.5 MG capsule   Commonly known as:  MICROZIDE   This may have changed:  additional instructions   Used for:  Essential hypertension with goal blood pressure less than 130/80        Dose:  12.5 mg   Take 1 capsule (12.5 mg) by mouth daily   Quantity:  90 capsule   Refills:  3       ondansetron 4 MG ODT tab   Commonly known as:  ZOFRAN-ODT   This may have changed:    - when to take this  - reasons to take this   Used for:  Intractable vomiting with nausea, unspecified vomiting type        Dose:  4 mg   Take 1 tablet (4 mg) by mouth every 6 hours   Quantity:  120 tablet   Refills:  0       Phenytoin Sodium Extended 200 MG Caps   This may have changed:  additional instructions   Used for:  Seizure disorder (H)        Dose:  200 mg   Take 200 mg by mouth 2 times daily   Quantity:  60 capsule   Refills:  0                 Primary Care Provider Office Phone # Fax #    Allan Casey [phone] [address] [place] [address]        Equal Access to Services     ROLANDAIRAJ VELMA : John jamison ku kenyattao Sostephali, waaxda luqadaha, qaybta kaalmada shashank, tonie chaorossana marroquin. So [facility] [phone].    ATENCIÓN: Si habla español, tiene a briones disposición servicios gratuitos de asistencia lingüística. Llame al [phone].    We comply with applicable federal civil rights laws and [place] laws. We do not discriminate on the basis of race, color, national origin, age, disability sex, sexual orientation or gender identity.            Thank you!     Thank you for choosing [facility]  for your care. Our goal is always to provide you with excellent care. Hearing back from our patients is one way we can continue to improve our services. Please take a few minutes to complete the written survey that you may receive in the mail after your visit with us. Thank you!             Your Updated Medication List - Protect others around you: Learn how to safely use, store and throw away your medicines at www.disposemymeds.org.          This list is accurate as of: 9/11/17 11:34 AM.  Always use your most recent med list.                   Brand Name Dispense Instructions for use Diagnosis    ACETAMINOPHEN PO      Take 1,000 mg by mouth every 6 hours as needed for fever Taking q4-6 hours, per MAR        ADVAIR DISKUS 250-50 MCG/DOSE diskus inhaler   Generic drug:  fluticasone-salmeterol      Inhale 1 puff into the lungs 2 times daily        albuterol (2.5 MG/3ML) 0.083% neb solution     360 mL    INHALE 1 VIAL VIA NEBULIZER EVERY 6 HOURS AS NEEDED    Chronic obstructive pulmonary disease, unspecified COPD type (H)       aspirin 81 MG EC tablet     90 tablet    Take 1 tablet (81 mg) by mouth daily    Unstable angina (H)       atorvastatin 40 MG tablet    LIPITOR    90  tablet    Take 1 tablet (40 mg) by mouth daily    Hyperlipidemia LDL goal <100       blood glucose monitoring meter device kit     1 kit    Use to test blood sugars 3 times daily or as directed.    Type 2 diabetes, HbA1C goal < 8% (H)       blood glucose monitoring test strip    ONE TOUCH ULTRA    3 Box    Use to test blood sugars 3 times daily or as directed.    Type 2 diabetes mellitus with diabetic peripheral angiopathy without gangrene, without long-term current use of insulin (H)       calcium citrate-vitamin D 315-250 MG-UNIT Tabs per tablet    CITRACAL    120 tablet    Take 2 tablets by mouth daily    Osteoporosis       cetirizine 10 MG tablet    zyrTEC    90 tablet    Take 1 tablet (10 mg) by mouth daily as needed for allergies    Seasonal allergic rhinitis, unspecified allergic rhinitis trigger       clopidogrel 75 MG tablet    PLAVIX    90 tablet    Take 1 tablet (75 mg) by mouth daily    PAD (peripheral artery disease) (H), UGI bleed, Osteoporosis, Nausea       CYANOCOBALAMIN PO      Take 2,000 mcg by mouth daily        cyclobenzaprine 10 MG tablet    FLEXERIL    30 tablet    TAKE ONE HALF TO ONE WHOLE TABLET BY MOUTH THREE TIMES A DAY AS NEEDED FOR MUSCLE SPASMS    Cervicalgia       diclofenac 1 % Gel topical gel    VOLTAREN    100 g    Apply 2 g topically 4 times daily to hands    Primary osteoarthritis of both hands       dorzolamide 2 % ophthalmic solution    TRUSOPT     Place 1 drop into both eyes 2 times daily        * EASY TOUCH LANCETS 30G/TWIST Misc           * blood glucose monitoring lancets     100 each    Use to test blood sugar 3 times daily or as directed.    Type 2 diabetes mellitus with diabetic peripheral angiopathy without gangrene, without long-term current use of insulin (H)       EPINEPHrine 0.15 MG/0.3ML injection 2-pack    EPIPEN JR    0.6 mL    Inject 0.3 mLs (0.15 mg) into the muscle as needed for anaphylaxis    Bee sting reaction, undetermined intent, subsequent encounter        ESCITALOPRAM OXALATE PO      Take 10 mg by mouth daily        estradiol 0.1 MG/GM cream    ESTRACE VAGINAL    42.5 g    Use dime size amount 3x a week at night    Personal history of urinary tract infection       estradiol 1 MG tablet    ESTRACE    90 tablet    Take 1 tablet (1 mg) by mouth daily    Person who has had sex change operation       ferrous sulfate 325 (65 FE) MG tablet    IRON    60 tablet    Take 1 tablet (325 mg) by mouth 2 times daily With meals        fluticasone 50 MCG/ACT spray    FLONASE     Spray 1 spray into both nostrils daily        hydrochlorothiazide 12.5 MG capsule    MICROZIDE    90 capsule    Take 1 capsule (12.5 mg) by mouth daily    Essential hypertension with goal blood pressure less than 130/80       RENÉE Pack      Take 1 packet by mouth 2 times daily        latanoprost 0.005 % ophthalmic solution    XALATAN    2.5 mL    Place 1 drop into both eyes At Bedtime    Primary open angle glaucoma, stage unspecified       levETIRAcetam 500 MG tablet    KEPPRA    180 tablet    Take 1 tablet (500 mg) by mouth 2 times daily    Nausea       lidocaine 5 % Patch    LIDODERM    30 patch    APPLY 1 TO 3 PATCHES TO PAINFUL AREA AT ONCE FOR UP TO 12 HOURS WITHIN A 24 HOUR PERIOD REMOVE AFTER 12 HOURS    Chronic pain syndrome, 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 (H)       lisinopril 10 MG tablet    PRINIVIL/ZESTRIL    90 tablet    Take 1 tablet (10 mg) by mouth daily    Essential hypertension with goal blood pressure less than 130/80       MEDICATION GIVEN BY INTRATHECAL PUMP - INSTRUCTION      continuous May 19, 2017 - per [facility] [place] [phone]: Conc: Bupivacaine 20 mg/mL and Fentanyl 2000 mcg/mL. Continuous: Bupivacaine 7.265 mg/day and Fentanyl 726.5 mcg/day. Boluses: Up to 7 boluses per 24-hr period of Bupivicaine 0.599 mg and Fentanyl 59.9 mcg  Pump Refill Date at [facility]: 7/2/17        metoprolol 25 MG 24 hr tablet     TOPROL-XL    30 tablet    TAKE 1 TABLET (25 MG) BY MOUTH DAILY    Old myocardial infarction       MULTIVITAMIN PO      Take 1 tablet by mouth daily        nitroGLYcerin 0.4 MG sublingual tablet    NITROSTAT    25 tablet    Place 1 tablet (0.4 mg) under the tongue every 5 minutes as needed for chest pain if you are still having symptoms after 3 doses (15 minutes) call 911.    Chronic systolic congestive heart failure (H), Old myocardial infarction       ondansetron 4 MG ODT tab    ZOFRAN-ODT    120 tablet    Take 1 tablet (4 mg) by mouth every 6 hours    Intractable vomiting with nausea, unspecified vomiting type       * order for DME     1 Month    Equipment being ordered: Depends briefs    Incontinence       * order for DME     1 Device    Equipment being ordered: Power Wheelchair    CVA (cerebral infarction), HTN (hypertension)       * order for DME     1 Units    Equipment being ordered: Nebulizer and supplies    Obstructive chronic bronchitis with exacerbation (H)       * order for DME     1 Box    Equipment being ordered: Glucerna daily shakes with each meal    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)       * order for DME     1 Device    ACcu check BID    Type 2 diabetes mellitus with diabetic peripheral angiopathy without gangrene, without long-term current use of insulin (H)       * order for DME     1 Units    Equipment being ordered: Nebulizer and tubing supplies    Simple chronic bronchitis (H)       * order for DME     1 Device    Equipment being ordered: CPAP supplies mask, hose, filters, cushion fax to [facility] at [phone] Disp: 10 DeviceRefills: prn Class: Local PrintStart: 2/10/2017    Chronic obstructive pulmonary disease, unspecified COPD type (H)       * order for DME     10 Device    Equipment being ordered: CPAP supplies mask, hose, filters, cushion  fax to [facility] at [phone]    COPD (chronic obstructive pulmonary disease) (H)       * order  "for DME     1 Device    Equipment being ordered: wheelchair seat cushion    Critical lower limb ischemia       * order for DME     1 Device    roho w/c cushion 18\" X 18\" for pressure relief    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 (H)       * order for DME     1 Device    Hospital bed with side rails    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 (H)       * order for DME     1 Device    Full electric hospital bed with half rails  Dx: D24467, I110, J449 Length of need: lifetime    Status post bilateral above knee amputation (H)       * order for DME     1 Device    Wheel Chair Cushion: 18 x 18 inch Roho cushion    Status post bilateral above knee amputation (H)       pantoprazole 40 MG EC tablet    PROTONIX     Take 40 mg by mouth daily        Phenytoin Sodium Extended 200 MG Caps     60 capsule    Take 200 mg by mouth 2 times daily    Seizure disorder (H)       polyethylene glycol Packet    MIRALAX/GLYCOLAX     Take 17 g by mouth daily Dissolved in water or juice        * pregabalin 75 MG capsule    LYRICA    120 capsule    Take 2 capsules (150 mg) by mouth 2 times daily    Pain in both upper extremities       * LYRICA 100 MG capsule   Generic drug:  pregabalin           prochlorperazine 10 MG tablet    COMPAZINE    20 tablet    Take 1 tablet (10 mg) by mouth every 8 hours as needed    Nausea with vomiting       risperiDONE 0.5 MG tablet    risperDAL     Take 0.5 mg by mouth At Bedtime        rOPINIRole 0.25 MG tablet    REQUIP          SPIRIVA HANDIHALER 18 MCG capsule   Generic drug:  tiotropium           sucralfate 1 GM tablet    CARAFATE    40 tablet    Take 1 tablet (1 g) by mouth 4 times daily May dissolve in 10 mL water is necessary. (Start upon completion of carafate suspension)    Gastroesophageal reflux disease without esophagitis       tamsulosin 0.4 MG capsule    FLOMAX          * traZODone 100 MG tablet    DESYREL    90 tablet    Take 1.5 tablets (150 mg) by mouth At " Bedtime    Anxiety state       * traZODone 50 MG tablet    DESYREL          umeclidinium 62.5 MCG/INH oral inhaler    INCRUSE ELLIPTA    3 Inhaler    Inhale 1 puff into the lungs daily    Chronic obstructive pulmonary disease, unspecified COPD type (H)       vitamin D 2000 UNITS tablet     100 tablet    Take 2,000 Units by mouth daily.        zinc 50 MG Tabs      Take 1 tablet by mouth daily        * Notice:  This list has 19 medication(s) that are the same as other medications prescribed for you. Read the directions carefully, and ask your doctor or other care provider to review them with you.

## 2017-09-11 NOTE — PATIENT INSTRUCTIONS
It was a pleasure a meeting you today:  -Do not skip meals. Can have 4-6 smaller meals/snacks per day which can include Glucerna as a meal/snack. Continue Glucerna shakes daily (drink at least 2 shakes per day, especially if skipping meals or unable to eat a meal).  -Decrease the amount of soda you drink each day. Recommend avoiding regular soda if possible but initially can decrease to no more than 1 can per day.  -Decrease the amount of juice you drink each day. Limit to no more than 1 cup of 100% juice per day.   -Chew food very well and eat slowly. Put fork or spoon down between bites. Swallow each bite of food before taking another bite.   If you would like to schedule a follow up visit with the Registered Dietitian. Please call [phone] to schedule. Schedule a follow up Dietitian visit in 1 month.

## 2017-09-11 NOTE — PROGRESS NOTES
[facility] Outpatient Medical Nutrition Therapy      Time Spent:  60 minutes  Session Type:  Individual Session  Referring Physician:  Monserrat Polk, VICTOR M-BC, WENDYP  Reason for RD Visit:  GERD with esophagitis, dysphagia, diabetes, hypertension, nutrition counseling.     Nutrition Assessment:  Patient is here for initial visit with Registered Dietitian (RD).  Patient is a 67 y.o. female with history of GERD with candida and reflux esophagitis, hiatal hernia, gastritis, hx dilation of esophagus, dysphagia, s/p right below knee amputation, left above knee amputation per patient, COPD, CVA, hypertension, hyperlipidemia, sleep apnea, type 2 diabetes, chronic pain, anemia, blind in left eye and legally blind in right eye, hx gender reassignment surgery and schizoeffective disorder. Patient saw CNP last week and planning to have gastric emptying study later this week.     Patient stated that she lives in an assisted living facility. At her facility, she follows a mechanically altered diet due to dysphagia. Her facility crushes her pills and puts in applesauce, and they serve her a mechanically soft diet as well. She reported that all of her foods are cut up including sandwiches, vegetables, fruit and meat. Is able to tolerate foods if cut up well. Stated she also needs to drink slowly otherwise chokes. Takes time to drink and used be a fast eater but now slowing down. Gets tired easily. Generally does not have much of an appetite. Her partner of 25 years recently passed away so reported this is contributing to her decreased appetite. Reported that she does meet with a therapist.     Her assisted living tests her blood sugars twice per day. Stated that her blood sugars vary but reported have been okay. In general feels nauseous. Has intermittent vomiting. This week has been good but last week had emesis even from 1 potato chip for example. Lost her dentures, but stated she does not have a hard time chewing food as long as it  "is cut up well for her. Some pain with swallowing but denies any burning with eating/swallowing. Per diet recall, she tends to drink a lot of sweetened liquids such as 3 cans regular soda per day and several glasses of juice. Tries to drink 3 servings of Glucerna oral nutrition drinks per day but drinks anywhere from 1-3/day. Does typically eat 3 meals per day at the same time at her AL, but will order 1/2 orders and/or not eat all of her foods at meals. Likes tony snaps and will eat 3-4 of these cookies sometimes. Reported that she is \"supposed\" to be following a lower sodium diet but was not really familiar with low sodium and eats what she is served at her assisted living. Additionally reported due to unable to see then unable to read labels. Had some basic idea of diabetic diet but could not recall specific recommendations.     Height:   Ht Readings from Last 1 Encounters:   08/28/17 1.702 m (5' 7\")     Weight:    Wt Readings from Last 20 Encounters:   09/08/17 62.6 kg (138 lb)   09/06/17 60.3 kg (133 lb)   08/28/17 60.3 kg (133 lb)   08/24/17 60.3 kg (133 lb)   08/13/17 60.3 kg (133 lb)   07/25/17 60.3 kg (133 lb)   07/19/17 60.3 kg (133 lb)   06/01/17 61.8 kg (136 lb 3.9 oz)   05/19/17 60.3 kg (133 lb)   05/10/17 60.3 kg (133 lb)   05/02/17 60.3 kg (133 lb)   04/26/17 60.3 kg (133 lb)   04/19/17 60.3 kg (133 lb)   04/10/17 60.3 kg (133 lb)   04/03/17 60.3 kg (133 lb)   03/22/17 60.3 kg (133 lb)   03/15/17 61.2 kg (135 lb)   03/06/17 59 kg (130 lb)   02/22/17 59 kg (130 lb)   02/06/17 61.2 kg (135 lb)   9/2/2017 140#    BMI: 20.87 healthy    Diet Recall: all foods are cut up for her and even sandwiches are in tiny pieces. Orders 1/2 order of meals typically and can eat ~1/2 the meal or less. Typically 1-3 glucerna drinks per day. Tries to have 3 per day.   Meal Food    Breakfast 8am: Oatmeal with sugar and cream and 2 slices toast with jelly and butter and sometimes 1/2-1 banana   Lunch 12pm: Varies: soup OR " burger with vegetable OR pot roast with vegetable and potatoes   Dinner 4:30pm: Varies: chopped ribs with vegetable and baked potatoes OR liver, onions and mashed potatoes   Snacks None OR 3-4 cookies   Beverages 3 bottles Glucerna, 44 oz water, ~3 glasses cranberry juice. 2-3 cans regular mountain dew red soda/day. Occasional hot/iced tea with splenda   Alcohol Intake denies     Frequency of eating/taking out meals: [employer] big mac with fries (~2x/month)     Labs:  On 8/13/17 decreased Cr (0.49), albumin (2.7), hematocrit (32.9) and hemoglobin (10.8) and elevated alk phosphatase (221). Others reviewed.  Pertinent Medications/vitamin and mineral supplements:   Ferrous sulfate, protonix, miralax, risperdal, sucralfate, cyanocobalaim, ca + D, HCTZ, zinc, prochlorperazine, multivitamin and 2000 IU vitamin D3. Others reviewed.   Food Allergies:  No known food allergies. Reported has lactose intolerance. Does tolerate cheese and  Estimated Nutrition Needs based on current body weight of 63 kg (138#):   5398-3319 calories (25-30 kcals/kg -~15% less due to bilateral amputation), 63-76g protein (1-1.2g/kg), 1890 ml fluids (~30 ml/kgor total fluids per MD).     MALNUTRITION:  % Weight Loss:  Weight loss does not meet criteria for malnutrition   % Intake:  Decreased intake does not meet criteria for malnutrition   Subcutaneous Fat Loss:  None observed  Muscle Loss:  None observed  Fluid Retention:  None noted    Malnutrition Diagnosis: Patient does not meet two of the above criteria necessary for diagnosing malnutrition  In Context of:  Chronic illness or disease    Nutrition Diagnosis:    Altered GI function related to medical dx, swallowing difficulty, decreased appetite, nausea and occasional vomiting as evidenced by patient report, hx GERD, dysphagia on mechanically altered diet in assisted living.    Knowledge and beliefs deficit related to lack of previous education and lack of complete recall of previous education  as evidenced by patient report.    Nutrition Prescription: Recommended general lower fat healthful diet as tolerated.    Nutrition Intervention:    1. Nutrition Education/Counseling:  Reviewed diet education with tips and suggestions for GERD including recommended foods (as tolerated) and foods not recommended if causing symptoms such as chocolate, caffeine, peppermint and spearmint, alcohol, spicy foods and fatty foods. Explained that coffee and mountain dew soda both have caffeine. Suggested trying decaf coffee, if regular coffee causes increased GERD symptoms. Provided lowfat diet tips. Encouraged drinking adequate fluids to get at least 48-64 oz.    Reviewed tips and suggestions for eating with swallowing issues. Recommended patient eat and drink slowly and chew food well. Put utensils down between bites of food, chew bites well and swallow before taking next bite. Continue having food cut up into small pieces as tolerated and continue more moist non dry foods as tolerated.    Reviewed general diabetic diet guidelines. Encouraged patient to eat at least 3 meals per day, no skipping. Told patient she could have 4-6 smaller meals and snacks if better tolerated. Okay for Glucerna oral nutrition supplement drink for a snack or meal if unable to eat a meal. Encouraged patient to drink at least 2 glucerna drinks per day. Discussed issues with sweetened drinks such as soda and juice with diabetes. Encouraged patient limit juice to no more than 1 cup of 100% juice per day in general and recommended aiming to decrease the amount of soda she drinks each day to 1 can or less per day initially with goal of trying to discontinue drinking daily. Recommended limiting cookie intake and discussed some healthy snack options.    Briefly reviewed low sodium diet tips and answered patient's low sodium diet questions. Discussed foods high in sodium and some tips/foods to substitute higher sodium foods and tips for making some lower  sodium food choices at her assisted living facility.    Educational Materials Provided:  GERD nutrition therapy, lowfat diet education handout  Patient verbalized understanding of education provided. Encouraged patient to focus on Goals listed below this month and patient will f/u in 1 month with RD.     Goals:  -Do not skip meals. Can have 4-6 smaller meals/snacks per day which can include Glucerna as a meal/snack. Continue Glucerna shakes daily (drink at least 2 shakes per day, especially if skipping meals or unable to eat a meal).  -Decrease the amount of soda you drink each day. Recommend avoiding regular soda if possible but initially can decrease to no more than 1 can per day.  -Decrease the amount of juice you drink each day. Limit to no more than 1 cup of 100% juice per day.   -Chew food very well and eat slowly. Put fork or spoon down between bites. Swallow each bite of food before taking another bite.     Nutrition Monitoring and Evaluation: Will monitor adherence to nutrition recommendations at future RD visits.     Further Medical Nutrition Therapy:  Yes  Next Appointment (if applicable):  Patient will f/u with RD in ~1 month; scheduled f/u on 10/11/2017.   Patient was encouraged to call/contact RD with any further questions.    Melonie Guerrero, MS, RD, LD

## 2017-09-12 ENCOUNTER — TELEPHONE (OUTPATIENT)
Dept: PHARMACY | Facility: CLINIC | Age: 67
End: 2017-09-12

## 2017-09-12 VITALS
BODY MASS INDEX: 23.18 KG/M2 | SYSTOLIC BLOOD PRESSURE: 113 MMHG | HEART RATE: 86 BPM | OXYGEN SATURATION: 96 % | WEIGHT: 148 LBS | DIASTOLIC BLOOD PRESSURE: 68 MMHG

## 2017-09-12 NOTE — TELEPHONE ENCOUNTER
Called pt to f/u from MTM visit on 7/31/17 - notes she still takes the Cyclobenzaprine on prn basis, but very rare.  We have discussed previously risks associated with this med, especially in combo with narcotics, but she doesn't seem to remember this today.  Recommend avoiding Cyclobenzaprine altogether and again discussed risks.    She mentioned inhaler is changing (from Incruse Ellipta to Spiriva, it appears) due to insurance.      Updated Epic med list to indicate that pt is not taking flomax or ropinirole.  Appears these were added back to her med list in error, but AL med list indicates she is not taking them, which is what was previously recommended - they had been successfully dc'd.  She is not taking these, and doing ok without them.    Pt will call in future with questions regarding meds, otherwise plan to follow-up in about 6 months.    Elizabeth Rose, Pharm.D.,[facility]  Board Certified Geriatric Pharmacist  Medication Therapy Management Pharmacist  [phone]

## 2017-09-15 ENCOUNTER — HOSPITAL ENCOUNTER (OUTPATIENT)
Dept: NUCLEAR MEDICINE | Facility: CLINIC | Age: 67
Setting detail: NUCLEAR MEDICINE
Discharge: HOME | End: 2017-09-15
Attending: REGISTERED NURSE | Admitting: REGISTERED NURSE
Payer: COMMERCIAL

## 2017-09-15 DIAGNOSIS — R68.81 EARLY SATIETY: ICD-10-CM

## 2017-09-15 DIAGNOSIS — E11.51 TYPE 2 DIABETES MELLITUS WITH DIABETIC PERIPHERAL ANGIOPATHY WITHOUT GANGRENE, WITHOUT LONG-TERM CURRENT USE OF INSULIN (H): ICD-10-CM

## 2017-09-15 PROCEDURE — 34300033 ZZH RX 343: Performed by: REGISTERED NURSE

## 2017-09-15 PROCEDURE — A9541 TC99M SULFUR COLLOID: HCPCS | Performed by: REGISTERED NURSE

## 2017-09-15 PROCEDURE — 78264 GASTRIC EMPTYING IMG STUDY: CPT

## 2017-09-15 RX ADMIN — Medication 2.1 MCI.: at 09:02

## 2017-09-18 ENCOUNTER — TRANSFERRED RECORDS (OUTPATIENT)
Dept: HEALTH INFORMATION MANAGEMENT | Facility: CLINIC | Age: 67
End: 2017-09-18

## 2017-09-18 ENCOUNTER — TELEPHONE (OUTPATIENT)
Dept: INTERNAL MEDICINE | Facility: CLINIC | Age: 67
End: 2017-09-18

## 2017-09-18 ENCOUNTER — TELEPHONE (OUTPATIENT)
Dept: NURSING | Facility: CLINIC | Age: 67
End: 2017-09-18

## 2017-09-18 ENCOUNTER — CARE COORDINATION (OUTPATIENT)
Dept: GERIATRIC MEDICINE | Facility: CLINIC | Age: 67
End: 2017-09-18

## 2017-09-18 DIAGNOSIS — R35.0 URINARY FREQUENCY: Primary | ICD-10-CM

## 2017-09-18 NOTE — TELEPHONE ENCOUNTER
Tammy nurse from assisted living, calling to request order for UA/UC.  Patient states that she has been having foul smelling odor with urination along with frequency.  No other symptoms.  Nurse reports that this began over the weekend.  Please advise.  Test can be performed at facility if approved by MD.

## 2017-09-18 NOTE — PROGRESS NOTES
9/18/17: CM received call from member stating that she came into  support and her income has now changed.  She is asking if she still has MA and EW.  She states that she now has to pay EW obligation to [facility].   She states that she has spoken with [employer] and her rent will increase as well as she no longer will have [facility].     CM received copy of [facility] showing Waiver obligation - $389 for 9/2017 and $59 for 10/2017.  Letter also states that QMB benefits will stop at end of 9/30/17 b/c not eligible for Part A.   CM and member made conference to [facility]  - spoke with Acosta  - she states that letter is incorrect in stating that member is not eligible for Medicare Part A.  She states that her QMB premiums will not be paid as she is above the income standard ($1025) for household of 1.  Acosta states she is still eligible however she now needs to pay her own premiums which will be automatically deducted by .   Acosta verified that member is still eligible for MA and EW and Managed Care.     CM and member made conference call to Jovana at [employer] to verify rent costs. Left voice message.       Jamie Sharma RN, PHN  [employer]

## 2017-09-18 NOTE — TELEPHONE ENCOUNTER
pt called and requesting cream for left buttocks because skin getting thin and hurts was prescribed when in hospital 3 tubes and is out of it; pharmacy downstairs [facility]

## 2017-09-20 DIAGNOSIS — G40.909 SEIZURE DISORDER (H): ICD-10-CM

## 2017-09-20 LAB — PHENYTOIN SERPL-MCNC: 10.2 MG/L (ref 10–20)

## 2017-09-20 PROCEDURE — 36415 COLL VENOUS BLD VENIPUNCTURE: CPT | Performed by: INTERNAL MEDICINE

## 2017-09-20 PROCEDURE — 99000 SPECIMEN HANDLING OFFICE-LAB: CPT | Performed by: INTERNAL MEDICINE

## 2017-09-20 PROCEDURE — 80185 ASSAY OF PHENYTOIN TOTAL: CPT | Performed by: INTERNAL MEDICINE

## 2017-09-20 PROCEDURE — 80186 ASSAY OF PHENYTOIN FREE: CPT | Mod: 90 | Performed by: INTERNAL MEDICINE

## 2017-09-20 NOTE — PROGRESS NOTES
9/20/17: CM placed call to [facility] to verify conversation on 9/18 as member wants to confirm and is anxious with these changes.  Elvira spoke with Torie at [facility]  - she reviewed case and verified that member is eligible for Medicare Part A and letter that was sent out is incorrect.  She fixed the system and reissued a new letter so that member has copy of correct reason for member having to start paying QMB benefits.   She states that EW obligation will actually be $69 due to a change in the amount deducted for remedial care d/t living in facility (it was entered as $196 however should be $186).   The new letter will state this.    ELVIRA informed member - she verbalized understanding.  Starting 10/1 - rent for [place] will be $962 ($893 rent + $69 EW oblig).     Jamie Sharma RN, N  [facility]

## 2017-09-21 ENCOUNTER — THERAPY VISIT (OUTPATIENT)
Dept: SPEECH THERAPY | Facility: CLINIC | Age: 67
End: 2017-09-21
Attending: REGISTERED NURSE

## 2017-09-21 ENCOUNTER — TELEPHONE (OUTPATIENT)
Dept: INTERNAL MEDICINE | Facility: CLINIC | Age: 67
End: 2017-09-21

## 2017-09-21 DIAGNOSIS — N30.00 ACUTE CYSTITIS WITHOUT HEMATURIA: Primary | ICD-10-CM

## 2017-09-21 DIAGNOSIS — R13.19 OTHER DYSPHAGIA: Primary | ICD-10-CM

## 2017-09-21 LAB — PHENYTOIN FREE SERPL-MCNC: 0.72 UG/ML

## 2017-09-21 RX ORDER — NITROFURANTOIN 25; 75 MG/1; MG/1
100 CAPSULE ORAL 2 TIMES DAILY
Qty: 14 CAPSULE | Refills: 0 | Status: SHIPPED | OUTPATIENT
Start: 2017-09-21 | End: 2017-09-21

## 2017-09-21 RX ORDER — NITROFURANTOIN 25; 75 MG/1; MG/1
100 CAPSULE ORAL 2 TIMES DAILY
Qty: 14 CAPSULE | Refills: 0 | Status: SHIPPED | OUTPATIENT
Start: 2017-09-21 | End: 2017-10-03

## 2017-09-21 NOTE — TELEPHONE ENCOUNTER
UA c/w acute cystitis, cultures show >100,000 col of Citrobacter, sensitive to quinolones, Bactrim, Macrobid.  Given interaction between Bactrim and Dilantin, will treat with nitrofurantoin.  Script sent to [facility] pharmacy.

## 2017-09-21 NOTE — Clinical Note
Medicare Certification - Add your Attestation  1. Review the Certification Documentation below  2. Click 'QuickNote' on your toolbar  3. Add P MEDICARE CERTIFICATION-UC as a recipient  4. Add your attestation using [email or web address] and choose Rehab Services Attestation - Physician (at the bottom)  5. Click 'Save as QuickAction' and name the Button '[facility] Rehab Cert.' Click 'Accept.' This is a onetime set up. You will now have a [facility] Rehab Cert button on the right side of the inbasket toolbar so the next time you need to add your attestation just, click the button. You will not have to go through any other steps.  6. Click 'Sign and Route'

## 2017-09-21 NOTE — PROGRESS NOTES
09/21/17 1500   General Information   Type Of Visit Initial   Start Of Care Date 09/21/17   Referring Physician Monserrat Cho Evaluate And Treat  (video swallow study)   Medical Diagnosis Dysphagia; esophageal dysphagia   Onset Of Illness/injury Or Date Of Surgery 09/21/17   Precautions/limitations No Known Precautions/limitations   Hearing WFL   Pertinent History of Current Problem/OT: Additional Occupational Profile Info Per chart review, pt has a history of esophageal issues and esophageal dysphagia since 2012.  She currently reports coughing and choking with solids and liquids.  Sometimes she feels like she needs to throw up the foods.  States she has had EGD several times, but there is too much scar tissue to do any more.  Currently she eats a mechanical soft diet and takes pills crushed with applesauce.  She is edentulous.  History of COPD and CVA.  States she has had swallowing issues for 20-30 years.   Respiratory Status Room air   Patient Role/employment History Disabled   Living Environment Assisted Living   General Observations Pt pleasant; willingly participating in evaluations.   Patient/family Goals To find out more about her swallowing.   Pain Assessment   Pain Reported Yes   Pain Location phantom pain in legs   Clinical Swallow Evaluation   Oral Musculature generally intact   Structural Abnormalities none present   Dentition edentulous, does not have dentures   Mucosal Quality good   Mandibular Strength and Mobility intact   Oral Labial Strength and Mobility WFL   Lingual Strength and Mobility WFL   Velar Elevation intact   Buccal Strength and Mobility intact   Laryngeal Function Cough;Swallow;Voicing initiated   Additional evaluation(s) completed today Yes   Rationale for completing additional evaluation VFSS indicated to assess oral pharyngeal phases of swallow function to rule out aspiration.   VFSS Evaluation   VFSS Additional Documentation Yes   VFSS Eval: Thin Liquid Texture Trial    Mode of Presentation, Thin Liquid cup   Order of Presentation 4   Preparatory Phase WFL   Oral Phase, Thin Liquid WFL   Pharyngeal Phase, Thin Liquid WFL   Diagnostic Statement Normal swallow observed   VFSS Eval: Puree Solid Texture Trial   Mode of Presentation, Puree spoon   Order of Presentation 1   Preparatory Phase WFL   Oral Phase, Puree WFL   Pharyngeal Phase, Puree WFL   Diagnostic Statement Normal swallow observed   VFSS Eval: Semisolid Texture Trial   Mode of Presentation, Semisolid spoon   Order of Presentation 2   Preparatory Phase WFL   Oral Phase, Semisolid Premature pharyngeal entry   Pharyngeal Phase, Semisolid Delayed swallow reflex   Rosenbek's Penetration Aspiration Scale: Semisolid Food Trial Results 1 - no aspiration, contrast does not enter airway   Diagnostic Statement Premature spillage to the pyriforms before the swallow was completed.   VFSS Eval: Solid Food Texture Trial   Mode of Presentation, Solid self-fed   Order of Presentation 3   Preparatory Phase WFL   Oral Phase, Solid WFL   Pharyngeal Phase, Solid WFL   Rosenbek's Penetration Aspiration Scale: Solid Food Trial Results 1 - no aspiration, contrast does not enter airway   Diagnostic Statement Pt started coughing before and during the swallow in oral prep phase.  No visible residue or premature spillage was present in the pharynx.   Esophageal Phase of Swallow   Patient reports or presents with symptoms of esophageal dysphagia Yes   Esophageal sweep performed during today s vidofluoroscopic exam  Please refer to radiologist's report for details   Esophageal comments Esophagram completed noting presence of reflux and small hiatal hernia.   General Therapy Interventions   Intervention Comments Evaluation only; recommendations given below verbally and in writing.   Swallow Eval: Clinical Impressions   Skilled Criteria for Therapy Intervention No problems identified which require skilled intervention   Treatment Diagnosis Normal  oropharyngeal swallow function observed in today's study.   Diet texture recommendations Dysphagia diet level 3;Thin liquids   Recommended Feeding/Eating Techniques no straws   Risks and Benefits of Treatment have been explained. Yes   Patient, family and/or staff in agreement with Plan of Care Yes   Total Session Time   Total Session Time 30   Total Evaluation Time 30     Therapy Certification   Certification date from 09/21/17   Certification date to 09/21/17   Medical Diagnosis Dysphagia; esophageal dysphagia   Certification I certify the need for these services furnished under this plan of treatment and while under my care.  (Physician co-signature of this document indicates review and certification of the therapy plan).   SLP Medicare Only G-code   G-code Swallowing   Swallowing   Swallowing:  Current Status , Goal , Discharge -[facility] Only-Modifier the same for all G-codes CH: 0% impairment   Swallowing: Current  & Discharge Modifier Rationale-Eval Only per VFSS

## 2017-09-21 NOTE — MR AVS SNAPSHOT
After Visit Summary   9/21/2017    Sonya Foote    MRN: [id number]           Patient Information     Date Of Birth          1950        Visit Information        Provider Department      9/21/2017 1:00 PM Cary Peñaloza SLP [facility]        Today's Diagnoses     Other dysphagia    -  1       Follow-ups after your visit        Your next 10 appointments already scheduled     Sep 27, 2017 11:15 AM CDT   Return Visit with Bj Anderson MD   [facility] [place] ([facility])    [address]  [facility] [address]   [phone]            Oct 11, 2017 10:30 AM CDT   NUTRITION VISIT with Melonie Guerrero RD   [facility] Gastroenterology and IBD Clinic ([facility])    [address]  [address] [facility] [address]   [phone]            Oct 18, 2017 12:00 PM CDT   Return Visit with Elizabeth Oliver MD   [facility] [place] ([facility])    [address]  [address]  [facility] [address]   [phone]            Nov 02, 2017  9:15 AM CDT   RETURN GLAUCOMA with Marely Robin MD   [facility] ([facility])    [address]  [address]  [facility] [address]   [phone]            Nov 10, 2017  9:20 AM CST   (Arrive by 9:05 AM)   RETURN DIABETES with Michelle Irizarry MD   [facility] Endocrinology ([facility])    [address] [place] [address]  [address] [facility] [address]   [phone]            Dec 29, 2017 10:00 AM CST   (Arrive by 9:45 AM)   New Patient Visit with VICTOR M Floyd Count includes the [facility] Gastroenterology [facility] ([facility])    [address]  [address] [facility] [address]   [phone]            Feb 19, 2018  9:30 AM CST   (Arrive by 9:15 AM)   Return Visit with Alina Jennings MD   [facility]  Lung Science and Health ([facility] [place])    [address]  [address] [facility] [id number] [phone]              Who to contact     Please call your clinic at [phone] to:    Ask questions about your health    Make or cancel appointments    Discuss your medicines    Learn about your test results    Speak to your doctor   If you have compliments or concerns about an experience at your clinic, or if you wish to file a complaint, please contact [facility] Patient Relations at [phone] or email us at [email or web address]         Additional Information About Your Visit        RUSBASE[employer]Glyde Information     [employer] is an electronic gateway that provides easy, online access to your medical records. With [employer], you can request a clinic appointment, read your test results, renew a prescription or communicate with your care team.     To sign up for [employer] visit the website at [email or web address]   You will be asked to enter the access code listed below, as well as some personal information. Please follow the directions to create your username and password.     Your access code is: [email or web address]  Expires: 10/17/2017  1:21 PM     Your access code will  in 90 days. If you need help or a new code, please contact your [facility] or call [phone] for assistance.        Care EveryWhere ID     This is your Care EveryWhere ID. This could be used by other organizations to access your [place] medical records  [id number]         Blood Pressure from Last 3 Encounters:   17 113/68   17 135/80   17 135/83    Weight from Last 3 Encounters:   17 67.1 kg (148 lb)   17 60.3 kg (133 lb)   17 60.3 kg (133 lb)              Today, you had the following     No orders found for display         Today's Medication Changes          These changes are accurate as of: 17  3:17 PM.  If  you have any questions, ask your nurse or doctor.               These medicines have changed or have updated prescriptions.        Dose/Directions    aspirin 81 MG EC tablet   This may have changed:  when to take this   Used for:  Unstable angina (H)        Dose:  81 mg   Take 1 tablet (81 mg) by mouth daily   Quantity:  90 tablet   Refills:  3       atorvastatin 40 MG tablet   Commonly known as:  LIPITOR   This may have changed:  when to take this   Used for:  Hyperlipidemia LDL goal <100        Dose:  40 mg   Take 1 tablet (40 mg) by mouth daily   Quantity:  90 tablet   Refills:  3       hydrochlorothiazide 12.5 MG capsule   Commonly known as:  MICROZIDE   This may have changed:  additional instructions   Used for:  Essential hypertension with goal blood pressure less than 130/80        Dose:  12.5 mg   Take 1 capsule (12.5 mg) by mouth daily   Quantity:  90 capsule   Refills:  3       ondansetron 4 MG ODT tab   Commonly known as:  ZOFRAN-ODT   This may have changed:    - when to take this  - reasons to take this   Used for:  Intractable vomiting with nausea, unspecified vomiting type        Dose:  4 mg   Take 1 tablet (4 mg) by mouth every 6 hours   Quantity:  120 tablet   Refills:  0       Phenytoin Sodium Extended 200 MG Caps   This may have changed:  additional instructions   Used for:  Seizure disorder (H)        Dose:  200 mg   Take 200 mg by mouth 2 times daily   Quantity:  60 capsule   Refills:  0                Primary Care Provider Office Phone # Fax #    Allan Casey [phone]       [address] [place] [address]        Equal Access to Services     [facility]: Hadii shaheen Cohen, waaxda luqmartina, qaybta kaalmada shashank, tonie marroquin. So [facility] [phone].    ATENCIÓN: Si habla español, tiene a briones disposición servicios gratuitos de asistencia lingüística. Llame al [phone].    We comply with applicable federal civil rights  laws and [place] laws. We do not discriminate on the basis of race, color, national origin, age, disability sex, sexual orientation or gender identity.            Thank you!     Thank you for choosing [facility]  for your care. Our goal is always to provide you with excellent care. Hearing back from our patients is one way we can continue to improve our services. Please take a few minutes to complete the written survey that you may receive in the mail after your visit with us. Thank you!             Your Updated Medication List - Protect others around you: Learn how to safely use, store and throw away your medicines at www.disposemymeds.org.          This list is accurate as of: 9/21/17  3:17 PM.  Always use your most recent med list.                   Brand Name Dispense Instructions for use Diagnosis    ACETAMINOPHEN PO      Take 1,000 mg by mouth every 6 hours as needed for fever Taking q4-6 hours, per MAR        ADVAIR DISKUS 250-50 MCG/DOSE diskus inhaler   Generic drug:  fluticasone-salmeterol      Inhale 1 puff into the lungs 2 times daily        albuterol (2.5 MG/3ML) 0.083% neb solution     360 mL    INHALE 1 VIAL VIA NEBULIZER EVERY 6 HOURS AS NEEDED    Chronic obstructive pulmonary disease, unspecified COPD type (H)       aspirin 81 MG EC tablet     90 tablet    Take 1 tablet (81 mg) by mouth daily    Unstable angina (H)       atorvastatin 40 MG tablet    LIPITOR    90 tablet    Take 1 tablet (40 mg) by mouth daily    Hyperlipidemia LDL goal <100       blood glucose monitoring meter device kit     1 kit    Use to test blood sugars 3 times daily or as directed.    Type 2 diabetes, HbA1C goal < 8% (H)       blood glucose monitoring test strip    ONE TOUCH ULTRA    3 Box    Use to test blood sugars 3 times daily or as directed.    Type 2 diabetes mellitus with diabetic peripheral angiopathy without gangrene, without long-term current use of insulin (H)       calcium citrate-vitamin D 315-250 MG-UNIT  Tabs per tablet    CITRACAL    120 tablet    Take 2 tablets by mouth daily    Osteoporosis       cetirizine 10 MG tablet    zyrTEC    90 tablet    Take 1 tablet (10 mg) by mouth daily as needed for allergies    Seasonal allergic rhinitis, unspecified allergic rhinitis trigger       clopidogrel 75 MG tablet    PLAVIX    90 tablet    Take 1 tablet (75 mg) by mouth daily    PAD (peripheral artery disease) (H), UGI bleed, Osteoporosis, Nausea       CYANOCOBALAMIN PO      Take 2,000 mcg by mouth daily        cyclobenzaprine 10 MG tablet    FLEXERIL    30 tablet    TAKE ONE HALF TO ONE WHOLE TABLET BY MOUTH THREE TIMES A DAY AS NEEDED FOR MUSCLE SPASMS    Cervicalgia       diclofenac 1 % Gel topical gel    VOLTAREN    100 g    Apply 2 g topically 4 times daily to hands    Primary osteoarthritis of both hands       dorzolamide 2 % ophthalmic solution    TRUSOPT     Place 1 drop into both eyes 2 times daily        * EASY TOUCH LANCETS 30G/TWIST Misc           * blood glucose monitoring lancets     100 each    Use to test blood sugar 3 times daily or as directed.    Type 2 diabetes mellitus with diabetic peripheral angiopathy without gangrene, without long-term current use of insulin (H)       EPINEPHrine 0.15 MG/0.3ML injection 2-pack    EPIPEN JR    0.6 mL    Inject 0.3 mLs (0.15 mg) into the muscle as needed for anaphylaxis    Bee sting reaction, undetermined intent, subsequent encounter       ESCITALOPRAM OXALATE PO      Take 10 mg by mouth daily        estradiol 0.1 MG/GM cream    ESTRACE VAGINAL    42.5 g    Use dime size amount 3x a week at night    Personal history of urinary tract infection       estradiol 1 MG tablet    ESTRACE    90 tablet    Take 1 tablet (1 mg) by mouth daily    Person who has had sex change operation       ferrous sulfate 325 (65 FE) MG tablet    IRON    60 tablet    Take 1 tablet (325 mg) by mouth 2 times daily With meals        fluticasone 50 MCG/ACT spray    FLONASE     Spray 1 spray into  both nostrils daily        hydrochlorothiazide 12.5 MG capsule    MICROZIDE    90 capsule    Take 1 capsule (12.5 mg) by mouth daily    Essential hypertension with goal blood pressure less than 130/80       RENÉE Pack      Take 1 packet by mouth 2 times daily        latanoprost 0.005 % ophthalmic solution    XALATAN    2.5 mL    Place 1 drop into both eyes At Bedtime    Primary open angle glaucoma, stage unspecified       levETIRAcetam 500 MG tablet    KEPPRA    180 tablet    Take 1 tablet (500 mg) by mouth 2 times daily    Nausea       lidocaine 5 % Patch    LIDODERM    30 patch    APPLY 1 TO 3 PATCHES TO PAINFUL AREA AT ONCE FOR UP TO 12 HOURS WITHIN A 24 HOUR PERIOD REMOVE AFTER 12 HOURS    Chronic pain syndrome, 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 (H)       lisinopril 10 MG tablet    PRINIVIL/ZESTRIL    90 tablet    Take 1 tablet (10 mg) by mouth daily    Essential hypertension with goal blood pressure less than 130/80       MEDICATION GIVEN BY INTRATHECAL PUMP - INSTRUCTION      continuous May 19, 2017 - per [facility] [place] [phone]: Conc: Bupivacaine 20 mg/mL and Fentanyl 2000 mcg/mL. Continuous: Bupivacaine 7.265 mg/day and Fentanyl 726.5 mcg/day. Boluses: Up to 7 boluses per 24-hr period of Bupivicaine 0.599 mg and Fentanyl 59.9 mcg  Pump Refill Date at [facility]: 7/2/17        metoprolol 25 MG 24 hr tablet    TOPROL-XL    30 tablet    TAKE 1 TABLET (25 MG) BY MOUTH DAILY    Old myocardial infarction       MULTIVITAMIN PO      Take 1 tablet by mouth daily        nitroGLYcerin 0.4 MG sublingual tablet    NITROSTAT    25 tablet    Place 1 tablet (0.4 mg) under the tongue every 5 minutes as needed for chest pain if you are still having symptoms after 3 doses (15 minutes) call 911.    Chronic systolic congestive heart failure (H), Old myocardial infarction       ondansetron 4 MG ODT tab    ZOFRAN-ODT    120 tablet    Take 1 tablet (4 mg) by  "mouth every 6 hours    Intractable vomiting with nausea, unspecified vomiting type       * order for DME     1 Month    Equipment being ordered: Depends briefs    Incontinence       * order for DME     1 Device    Equipment being ordered: Power Wheelchair    CVA (cerebral infarction), HTN (hypertension)       * order for DME     1 Units    Equipment being ordered: Nebulizer and supplies    Obstructive chronic bronchitis with exacerbation (H)       * order for DME     1 Box    Equipment being ordered: Glucerna daily shakes with each meal    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)       * order for DME     1 Device    ACcu check BID    Type 2 diabetes mellitus with diabetic peripheral angiopathy without gangrene, without long-term current use of insulin (H)       * order for DME     1 Units    Equipment being ordered: Nebulizer and tubing supplies    Simple chronic bronchitis (H)       * order for DME     1 Device    Equipment being ordered: CPAP supplies mask, hose, filters, cushion fax to [facility] at [phone] Disp: 10 DeviceRefills: prn Class: Local PrintStart: 2/10/2017    Chronic obstructive pulmonary disease, unspecified COPD type (H)       * order for DME     10 Device    Equipment being ordered: CPAP supplies mask, hose, filters, cushion  fax to [facility] at [phone]    COPD (chronic obstructive pulmonary disease) (H)       * order for DME     1 Device    Equipment being ordered: wheelchair seat cushion    Critical lower limb ischemia       * order for DME     1 Device    roho w/c cushion 18\" X 18\" for pressure relief    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 (H)       * order for DME     1 Device    Hospital bed with side rails    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 (H)       * order for DME     1 Device    Full electric hospital bed with half rails  Dx: U66994, I110, J449 Length of need: lifetime    Status post " bilateral above knee amputation (H)       * order for DME     1 Device    Wheel Chair Cushion: 18 x 18 inch Roho cushion    Status post bilateral above knee amputation (H)       pantoprazole 40 MG EC tablet    PROTONIX     Take 40 mg by mouth daily        Phenytoin Sodium Extended 200 MG Caps     60 capsule    Take 200 mg by mouth 2 times daily    Seizure disorder (H)       polyethylene glycol Packet    MIRALAX/GLYCOLAX     Take 17 g by mouth daily Dissolved in water or juice        * pregabalin 75 MG capsule    LYRICA    120 capsule    Take 2 capsules (150 mg) by mouth 2 times daily    Pain in both upper extremities       * LYRICA 100 MG capsule   Generic drug:  pregabalin           prochlorperazine 10 MG tablet    COMPAZINE    20 tablet    Take 1 tablet (10 mg) by mouth every 8 hours as needed    Nausea with vomiting       risperiDONE 0.5 MG tablet    risperDAL     Take 0.5 mg by mouth At Bedtime        SPIRIVA HANDIHALER 18 MCG capsule   Generic drug:  tiotropium           sucralfate 1 GM tablet    CARAFATE    40 tablet    Take 1 tablet (1 g) by mouth 4 times daily May dissolve in 10 mL water is necessary. (Start upon completion of carafate suspension)    Gastroesophageal reflux disease without esophagitis       * traZODone 100 MG tablet    DESYREL    90 tablet    Take 1.5 tablets (150 mg) by mouth At Bedtime    Anxiety state       * traZODone 50 MG tablet    DESYREL          vitamin D 2000 UNITS tablet     100 tablet    Take 2,000 Units by mouth daily.        zinc 50 MG Tabs      Take 1 tablet by mouth daily        * Notice:  This list has 19 medication(s) that are the same as other medications prescribed for you. Read the directions carefully, and ask your doctor or other care provider to review them with you.

## 2017-09-21 NOTE — TELEPHONE ENCOUNTER
See 9/18 TE. UA was ordered for Assisted living. Called assisted living, they are still awaiting results.   Left detailed message per consent below.

## 2017-09-21 NOTE — TELEPHONE ENCOUNTER
Reason for call:  Results   Name of test or procedure: patient said she did labs for a possible Uti  Date of test or procedure: not  sure which day and patient couldn't remember   Location of test or procedure: [facility] lab    Additional comments: Please call patient to let her know the results, as she is concerned      Phone number to reach patient:  Home number on file [phone] (home)    Best Time:  anytime    Can we leave a detailed message on this number?  YES

## 2017-09-22 NOTE — TELEPHONE ENCOUNTER
Pt calling back, requesting that order for RX to be faxed to the nurses at her care facility. RX printed and faxed to # [phone].

## 2017-09-26 DIAGNOSIS — K21.9 GASTROESOPHAGEAL REFLUX DISEASE WITHOUT ESOPHAGITIS: ICD-10-CM

## 2017-09-26 RX ORDER — SUCRALFATE 1 G/1
1 TABLET ORAL 4 TIMES DAILY
Qty: 40 TABLET | Refills: 5 | Status: CANCELLED | OUTPATIENT
Start: 2017-09-26

## 2017-09-26 NOTE — TELEPHONE ENCOUNTER
sucralfate      Last Written Prescription Date: 02/21/17  Last Fill Quantity: 40,  # refills: 5   Last Office Visit with [facility] or [facility] prescribing provider: 09/21/17                                         Next 5 appointments (look out 90 days)     Sep 27, 2017 11:15 AM CDT   Return Visit with Bj Anderson MD   [facility] [place] ([facility])    [address] [place] [address]   [phone]            Oct 03, 2017 11:20 AM CDT   Office Visit with Allan Casey MD   [facility]    [address] [address] [phone]

## 2017-09-27 ENCOUNTER — OFFICE VISIT (OUTPATIENT)
Dept: CARDIOLOGY | Facility: CLINIC | Age: 67
End: 2017-09-27
Attending: INTERNAL MEDICINE
Payer: COMMERCIAL

## 2017-09-27 VITALS — HEART RATE: 80 BPM | DIASTOLIC BLOOD PRESSURE: 72 MMHG | SYSTOLIC BLOOD PRESSURE: 123 MMHG | HEIGHT: 67 IN

## 2017-09-27 DIAGNOSIS — I25.5 ISCHEMIC CARDIOMYOPATHY: ICD-10-CM

## 2017-09-27 DIAGNOSIS — I73.9 PAD (PERIPHERAL ARTERY DISEASE) (H): ICD-10-CM

## 2017-09-27 DIAGNOSIS — I50.22 CHRONIC SYSTOLIC CONGESTIVE HEART FAILURE (H): ICD-10-CM

## 2017-09-27 DIAGNOSIS — I10 ESSENTIAL HYPERTENSION WITH GOAL BLOOD PRESSURE LESS THAN 130/80: ICD-10-CM

## 2017-09-27 DIAGNOSIS — E78.5 HYPERLIPIDEMIA LDL GOAL <70: ICD-10-CM

## 2017-09-27 DIAGNOSIS — I25.118 CORONARY ARTERY DISEASE OF NATIVE ARTERY OF NATIVE HEART WITH STABLE ANGINA PECTORIS (H): Primary | ICD-10-CM

## 2017-09-27 DIAGNOSIS — I25.2 OLD MYOCARDIAL INFARCTION: ICD-10-CM

## 2017-09-27 PROCEDURE — 99214 OFFICE O/P EST MOD 30 MIN: CPT | Performed by: INTERNAL MEDICINE

## 2017-09-27 NOTE — LETTER
9/27/2017    Allan Casey MD  [address] [facility] [address]    RE: Sonya Foote       Dear Colleague,    I had the pleasure of seeing Sonya Foote in the [facility].    PRIMARY CARE PHYSICIAN:  Allan Casey MD      I again had the pleasure of seeing your patient, Sonya Foote, at [facility] for evaluation and treatment of coronary artery disease and panvascular disease.  Sonya Foote is a pleasant 67-year-old -American female with a history of chronic dysphasia, esophageal strictures and previous dilatations.  She underwent a left above the knee amputation for peripheral vascular disease and thrombosis on 06/06/2016 and a right above-the-knee amputation for peripheral arterial disease on 11/23/2016.  The patient presented for unstable angina and inferior wall myocardial infarction to the [facility] in 09/2016.  Her ejection fraction by cardiac MRI in June had been 60%.  She underwent coronary angiography in September of last year with high-grade stenosis of the proximal right coronary artery found.  Intracoronary stenting was performed.  Her ejection fraction fell to 35%-40%.  She has remained on aspirin and Plavix since that time.  She has had intermittent episodes of right and substernal chest pressure relieved with sublingual nitroglycerin.  The patient believes she had a previous myocardial infarction in 2005 at the time of a hemorrhagic stroke and kidney failure.  There was evidence of LAD distribution akinesis as well as the inferior wall akinesis on echocardiogram in December of last year.  The patient denies significant shortness of breath.  She remains at assisted living.  Her medications are all reviewed.  She has ongoing cerebral arterial disease including her carotids which are being followed by Vascular Surgery.  The patient is status post previous stenting of her left carotid artery in 04/2016.  She notes that she has  "returned to smoking 3 cigarettes per day.  We talked about this for close to 7 minutes and I gave her reasons to quit and we discussed cessation techniques.      PHYSICAL EXAMINATION:  As listed below.     Outpatient Encounter Prescriptions as of 9/27/2017   Medication Sig Dispense Refill     nitroFURantoin, macrocrystal-monohydrate, (MACROBID) 100 MG capsule Take 1 capsule (100 mg) by mouth 2 times daily 14 capsule 0     traZODone (DESYREL) 50 MG tablet        SPIRIVA HANDIHALER 18 MCG capsule        LYRICA 100 MG capsule        lidocaine (LIDODERM) 5 % Patch APPLY 1 TO 3 PATCHES TO PAINFUL AREA AT ONCE FOR UP TO 12 HOURS WITHIN A 24 HOUR PERIOD REMOVE AFTER 12 HOURS 30 patch 5     cyclobenzaprine (FLEXERIL) 10 MG tablet TAKE ONE HALF TO ONE WHOLE TABLET BY MOUTH THREE TIMES A DAY AS NEEDED FOR MUSCLE SPASMS 30 tablet 1     blood glucose monitoring (ONE TOUCH ULTRASOFT) lancets Use to test blood sugar 3 times daily or as directed. 100 each PRN     blood glucose monitoring (ONE TOUCH ULTRA) test strip Use to test blood sugars 3 times daily or as directed. 3 Box 3     estradiol (ESTRACE VAGINAL) 0.1 MG/GM cream Use dime size amount 3x a week at night 42.5 g 3     metoprolol (TOPROL-XL) 25 MG 24 hr tablet TAKE 1 TABLET (25 MG) BY MOUTH DAILY 30 tablet 10     traZODone (DESYREL) 100 MG tablet Take 1.5 tablets (150 mg) by mouth At Bedtime 90 tablet 3     order for [facility] Full electric hospital bed with half rails    Dx: U56120, I110, J449  Length of need: lifetime 1 Device 0     order for [facility] Wheel Chair Cushion: 18 x 18 inch Roho cushion 1 Device 0     order for [facility] roho w/c cushion 18\" X 18\" for pressure relief 1 Device 0     order for [facility] Hospital bed with side rails 1 Device 0     polyethylene glycol (MIRALAX/GLYCOLAX) Packet Take 17 g by mouth daily Dissolved in water or juice       ESCITALOPRAM OXALATE PO Take 10 mg by mouth daily       ACETAMINOPHEN PO Take 1,000 mg by mouth every 6 hours as needed for fever Taking " q4-6 hours, per MAR       pregabalin (LYRICA) 75 MG capsule Take 2 capsules (150 mg) by mouth 2 times daily 120 capsule 5     cetirizine (ZYRTEC) 10 MG tablet Take 1 tablet (10 mg) by mouth daily as needed for allergies 90 tablet 3     diclofenac (VOLTAREN) 1 % GEL topical gel Apply 2 g topically 4 times daily to hands 100 g 11     EPINEPHrine (EPIPEN JR) 0.15 MG/0.3ML injection Inject 0.3 mLs (0.15 mg) into the muscle as needed for anaphylaxis (Patient not taking: Reported on 9/8/2017) 0.6 mL 1     lisinopril (PRINIVIL/ZESTRIL) 10 MG tablet Take 1 tablet (10 mg) by mouth daily 90 tablet 3     pantoprazole (PROTONIX) 40 MG EC tablet Take 40 mg by mouth daily       risperiDONE (RISPERDAL) 0.5 MG tablet Take 0.5 mg by mouth At Bedtime       ferrous sulfate (IRON) 325 (65 FE) MG tablet Take 1 tablet (325 mg) by mouth 2 times daily With meals 60 tablet 2     order for DME Equipment being ordered: wheelchair seat cushion 1 Device 0     order for DME Equipment being ordered: CPAP supplies mask, hose, filters, cushion    fax to [facility] at [phone] 10 Device prn     sucralfate (CARAFATE) 1 GM tablet Take 1 tablet (1 g) by mouth 4 times daily May dissolve in 10 mL water is necessary. (Start upon completion of carafate suspension) 40 tablet 5     order for DME Equipment being ordered: CPAP supplies mask, hose, filters, cushion fax to [facility] at [phone]  Disp: 10 Device Refills: prn   Class: Local Print Start: 2/10/2017 1 Device 0     order for DME Equipment being ordered: Nebulizer and tubing supplies 1 Units 0     albuterol (2.5 MG/3ML) 0.083% neb solution INHALE 1 VIAL VIA NEBULIZER EVERY 6 HOURS AS NEEDED 360 mL 11     order for DME ACcu check BID 1 Device 0     ondansetron (ZOFRAN-ODT) 4 MG ODT tab Take 1 tablet (4 mg) by mouth every 6 hours (Patient taking differently: Take 4 mg by mouth every 6 hours as needed ) 120 tablet 0     CYANOCOBALAMIN PO Take 2,000 mcg by mouth daily       Nutritional  Supplements (RENÉE) PACK Take 1 packet by mouth 2 times daily       fluticasone (FLONASE) 50 MCG/ACT nasal spray Spray 1 spray into both nostrils daily       ADVAIR DISKUS 250-50 MCG/DOSE diskus inhaler Inhale 1 puff into the lungs 2 times daily        calcium citrate-vitamin D (CITRACAL) 315-250 MG-UNIT TABS Take 2 tablets by mouth daily 120 tablet 5     hydrochlorothiazide (MICROZIDE) 12.5 MG capsule Take 1 capsule (12.5 mg) by mouth daily (Patient taking differently: Take 12.5 mg by mouth daily Hold for sbp<90) 90 capsule 3     estradiol (ESTRACE) 1 MG tablet Take 1 tablet (1 mg) by mouth daily 90 tablet 3     levETIRAcetam (KEPPRA) 500 MG tablet Take 1 tablet (500 mg) by mouth 2 times daily 180 tablet 1     aspirin EC 81 MG EC tablet Take 1 tablet (81 mg) by mouth daily (Patient taking differently: Take 81 mg by mouth every morning ) 90 tablet 3     clopidogrel (PLAVIX) 75 MG tablet Take 1 tablet (75 mg) by mouth daily 90 tablet 3     blood glucose monitoring (ONE TOUCH ULTRA 2) meter device kit Use to test blood sugars 3 times daily or as directed. 1 kit 0     phenytoin 200 MG CAPS Take 200 mg by mouth 2 times daily (Patient taking differently: Take 200 mg by mouth 2 times daily (takes at 8 AM and 8 PM)) 60 capsule 0     dorzolamide (TRUSOPT) 2 % ophthalmic solution Place 1 drop into both eyes 2 times daily        zinc 50 MG TABS Take 1 tablet by mouth daily       nitroglycerin (NITROSTAT) 0.4 MG SL tablet Place 1 tablet (0.4 mg) under the tongue every 5 minutes as needed for chest pain if you are still having symptoms after 3 doses (15 minutes) call 911. 25 tablet 1     MEDICATION GIVEN BY INTRATHECAL PUMP - INSTRUCTION continuous May 19, 2017 - per [facility] [place] [phone]:  Conc: Bupivacaine 20 mg/mL and Fentanyl 2000 mcg/mL.  Continuous: Bupivacaine 7.265 mg/day and Fentanyl 726.5 mcg/day.  Boluses: Up to 7 boluses per 24-hr period of Bupivicaine 0.599 mg and Fentanyl  59.9 mcg    Pump Refill Date at [facility]: 7/2/17       latanoprost (XALATAN) 0.005 % ophthalmic solution Place 1 drop into both eyes At Bedtime 2.5 mL 11     atorvastatin (LIPITOR) 40 MG tablet Take 1 tablet (40 mg) by mouth daily (Patient taking differently: Take 40 mg by mouth At Bedtime ) 90 tablet 3     order for DME Equipment being ordered: Glucerna daily shakes with each meal 1 Box 11     prochlorperazine (COMPAZINE) 10 MG tablet Take 1 tablet (10 mg) by mouth every 8 hours as needed 20 tablet 0     ORDER FOR DME Equipment being ordered: Nebulizer and supplies 1 Units 0     ORDER FOR DME Equipment being ordered: Power Wheelchair 1 Device 0     ORDER FOR DME Equipment being ordered: Depends briefs 1 Month 11     EASY TOUCH LANCETS 30G/TWIST MISC        Cholecalciferol (VITAMIN D) 2000 UNITS tablet Take 2,000 Units by mouth daily. 100 tablet 3     Multiple Vitamin (MULTIVITAMIN OR) Take 1 tablet by mouth daily        Facility-Administered Encounter Medications as of 9/27/2017   Medication Dose Route Frequency Provider Last Rate Last Dose     neostigmine (PROSTIGMINE) injection    PRN Han Terrazas APRN CRNA   4 mg at 11/21/14 1230     glycopyrrolate (ROBINUL) injection    PRN Han Terrazas APRN CRNA   0.8 mg at 11/21/14 1230      ASSESSMENT:   1.  Sonya Foote is a delightful 67-year-old female who presents for coronary artery disease after an inferior wall myocardial infarction in 09/2016.  There is no evidence of congestive heart failure.  We are going to repeat a Lexiscan nuclear stress test to assess ongoing ischemia.  If she does not have ischemia, we will plan on stopping the Plavix.  If her ejection fraction is below 35%, we will consider implantation of an AICD.   2.  Diabetes mellitus, being cared for by her primary care physicians.   3.  Peripheral arterial disease, status post bilateral above the knee amputations.   4.  Systolic hypertension, currently under reasonably good control.    5.  Tobacco abuse.  I discussed cessation with her and included both reasons and techniques to quit.  She notes considerable stress in her life including the loss of her significant other.  She also uses E-cigarettes.      It is my pleasure to assist in the care of Sonya Foote.  All her questions were answered to her satisfaction.  I will see her again in 9 months if she remains stable.     Sincerely,    Bj Anderson MD     [facility]       No complaints

## 2017-09-27 NOTE — PROGRESS NOTES
HPI and Plan:   See dictation:[id number]    Orders Placed This Encounter   Procedures     NM Lexiscan stress test (nuc card)     Follow-Up with Cardiologist       No orders of the defined types were placed in this encounter.      There are no discontinued medications.      Encounter Diagnoses   Name Primary?     Coronary artery disease of native artery of native heart with stable angina pectoris (H)      Old myocardial infarction      Chronic systolic congestive heart failure (H)      Essential hypertension with goal blood pressure less than 130/80      Hyperlipidemia LDL goal <70        CURRENT MEDICATIONS:  Current Outpatient Prescriptions   Medication Sig Dispense Refill     nitroFURantoin, macrocrystal-monohydrate, (MACROBID) 100 MG capsule Take 1 capsule (100 mg) by mouth 2 times daily 14 capsule 0     traZODone (DESYREL) 50 MG tablet        SPIRIVA HANDIHALER 18 MCG capsule        LYRICA 100 MG capsule        lidocaine (LIDODERM) 5 % Patch APPLY 1 TO 3 PATCHES TO PAINFUL AREA AT ONCE FOR UP TO 12 HOURS WITHIN A 24 HOUR PERIOD REMOVE AFTER 12 HOURS 30 patch 5     cyclobenzaprine (FLEXERIL) 10 MG tablet TAKE ONE HALF TO ONE WHOLE TABLET BY MOUTH THREE TIMES A DAY AS NEEDED FOR MUSCLE SPASMS 30 tablet 1     blood glucose monitoring (ONE TOUCH ULTRASOFT) lancets Use to test blood sugar 3 times daily or as directed. 100 each PRN     blood glucose monitoring (ONE TOUCH ULTRA) test strip Use to test blood sugars 3 times daily or as directed. 3 Box 3     estradiol (ESTRACE VAGINAL) 0.1 MG/GM cream Use dime size amount 3x a week at night 42.5 g 3     metoprolol (TOPROL-XL) 25 MG 24 hr tablet TAKE 1 TABLET (25 MG) BY MOUTH DAILY 30 tablet 10     traZODone (DESYREL) 100 MG tablet Take 1.5 tablets (150 mg) by mouth At Bedtime 90 tablet 3     order for DME Full electric hospital bed with half rails    Dx: Y76034, I110, J449  Length of need: lifetime 1 Device 0     order for DME Wheel Chair Cushion: 18 x 18 inch Roho cushion 1  "Device 0     order for DME roho w/c cushion 18\" X 18\" for pressure relief 1 Device 0     order for DME Hospital bed with side rails 1 Device 0     polyethylene glycol (MIRALAX/GLYCOLAX) Packet Take 17 g by mouth daily Dissolved in water or juice       ESCITALOPRAM OXALATE PO Take 10 mg by mouth daily       ACETAMINOPHEN PO Take 1,000 mg by mouth every 6 hours as needed for fever Taking q4-6 hours, per MAR       pregabalin (LYRICA) 75 MG capsule Take 2 capsules (150 mg) by mouth 2 times daily 120 capsule 5     cetirizine (ZYRTEC) 10 MG tablet Take 1 tablet (10 mg) by mouth daily as needed for allergies 90 tablet 3     diclofenac (VOLTAREN) 1 % GEL topical gel Apply 2 g topically 4 times daily to hands 100 g 11     EPINEPHrine (EPIPEN JR) 0.15 MG/0.3ML injection Inject 0.3 mLs (0.15 mg) into the muscle as needed for anaphylaxis (Patient not taking: Reported on 9/8/2017) 0.6 mL 1     lisinopril (PRINIVIL/ZESTRIL) 10 MG tablet Take 1 tablet (10 mg) by mouth daily 90 tablet 3     pantoprazole (PROTONIX) 40 MG EC tablet Take 40 mg by mouth daily       risperiDONE (RISPERDAL) 0.5 MG tablet Take 0.5 mg by mouth At Bedtime       ferrous sulfate (IRON) 325 (65 FE) MG tablet Take 1 tablet (325 mg) by mouth 2 times daily With meals 60 tablet 2     order for DME Equipment being ordered: wheelchair seat cushion 1 Device 0     order for DME Equipment being ordered: CPAP supplies mask, hose, filters, cushion    fax to [facility] at [phone] 10 Device prn     sucralfate (CARAFATE) 1 GM tablet Take 1 tablet (1 g) by mouth 4 times daily May dissolve in 10 mL water is necessary. (Start upon completion of carafate suspension) 40 tablet 5     order for DME Equipment being ordered: CPAP supplies mask, hose, filters, cushion fax to [facility] at [phone]  Disp: 10 Device Refills: prn   Class: Local Print Start: 2/10/2017 1 Device 0     order for DME Equipment being ordered: Nebulizer and tubing supplies 1 Units 0     " albuterol (2.5 MG/3ML) 0.083% neb solution INHALE 1 VIAL VIA NEBULIZER EVERY 6 HOURS AS NEEDED 360 mL 11     order for DME [facility]cu check BID 1 Device 0     ondansetron (ZOFRAN-ODT) 4 MG ODT tab Take 1 tablet (4 mg) by mouth every 6 hours (Patient taking differently: Take 4 mg by mouth every 6 hours as needed ) 120 tablet 0     CYANOCOBALAMIN PO Take 2,000 mcg by mouth daily       Nutritional Supplements (RENÉE) PACK Take 1 packet by mouth 2 times daily       fluticasone (FLONASE) 50 MCG/ACT nasal spray Spray 1 spray into both nostrils daily       ADVAIR DISKUS 250-50 MCG/DOSE diskus inhaler Inhale 1 puff into the lungs 2 times daily        calcium citrate-vitamin D (CITRACAL) 315-250 MG-UNIT TABS Take 2 tablets by mouth daily 120 tablet 5     hydrochlorothiazide (MICROZIDE) 12.5 MG capsule Take 1 capsule (12.5 mg) by mouth daily (Patient taking differently: Take 12.5 mg by mouth daily Hold for sbp<90) 90 capsule 3     estradiol (ESTRACE) 1 MG tablet Take 1 tablet (1 mg) by mouth daily 90 tablet 3     levETIRAcetam (KEPPRA) 500 MG tablet Take 1 tablet (500 mg) by mouth 2 times daily 180 tablet 1     aspirin EC 81 MG EC tablet Take 1 tablet (81 mg) by mouth daily (Patient taking differently: Take 81 mg by mouth every morning ) 90 tablet 3     clopidogrel (PLAVIX) 75 MG tablet Take 1 tablet (75 mg) by mouth daily 90 tablet 3     blood glucose monitoring (ONE TOUCH ULTRA 2) meter device kit Use to test blood sugars 3 times daily or as directed. 1 kit 0     phenytoin 200 MG CAPS Take 200 mg by mouth 2 times daily (Patient taking differently: Take 200 mg by mouth 2 times daily (takes at 8 AM and 8 PM)) 60 capsule 0     dorzolamide (TRUSOPT) 2 % ophthalmic solution Place 1 drop into both eyes 2 times daily        zinc 50 MG TABS Take 1 tablet by mouth daily       nitroglycerin (NITROSTAT) 0.4 MG SL tablet Place 1 tablet (0.4 mg) under the tongue every 5 minutes as needed for chest pain if you are still having symptoms after 3  doses (15 minutes) call 911. 25 tablet 1     MEDICATION GIVEN BY INTRATHECAL PUMP - INSTRUCTION continuous May 19, 2017 - per [facility] in [place] [phone]:  Conc: Bupivacaine 20 mg/mL and Fentanyl 2000 mcg/mL.  Continuous: Bupivacaine 7.265 mg/day and Fentanyl 726.5 mcg/day.  Boluses: Up to 7 boluses per 24-hr period of Bupivicaine 0.599 mg and Fentanyl 59.9 mcg    Pump Refill Date at [facility]: 7/2/17       latanoprost (XALATAN) 0.005 % ophthalmic solution Place 1 drop into both eyes At Bedtime 2.5 mL 11     atorvastatin (LIPITOR) 40 MG tablet Take 1 tablet (40 mg) by mouth daily (Patient taking differently: Take 40 mg by mouth At Bedtime ) 90 tablet 3     order for DME Equipment being ordered: Glucerna daily shakes with each meal 1 Box 11     prochlorperazine (COMPAZINE) 10 MG tablet Take 1 tablet (10 mg) by mouth every 8 hours as needed 20 tablet 0     ORDER FOR DME Equipment being ordered: Nebulizer and supplies 1 Units 0     ORDER FOR DME Equipment being ordered: Power Wheelchair 1 Device 0     ORDER FOR DME Equipment being ordered: Depends briefs 1 Month 11     EASY TOUCH LANCETS 30G/TWIST MISC        Cholecalciferol (VITAMIN D) 2000 UNITS tablet Take 2,000 Units by mouth daily. 100 tablet 3     Multiple Vitamin (MULTIVITAMIN OR) Take 1 tablet by mouth daily          ALLERGIES     Allergies   Allergen Reactions     Bee Venom      Penicillins Anaphylaxis     Other reaction(s): Skin Rash and/or Hives     Dilantin [Phenytoin] Other (See Comments)     Generic dilantin only per pt     Iodide Hives     09/11/15: Pt states she can use iodine and doesn't have any problems      Iodine-131      Novocaine [Procaine] Hives     Other reaction(s): Skin Rash and/or Hives       PAST MEDICAL HISTORY:  Past Medical History:   Diagnosis Date     Anemia      Antiplatelet or antithrombotic long-term use      Arthritis      AS (sickle cell trait) (H) 10/8/2013     Blind left eye      BPPV  (benign paroxysmal positional vertigo)      Chronic back pain      COPD (chronic obstructive pulmonary disease) (H)      Coronary artery disease      CVA (cerebral infarction) 10/8/2012    x3, residual left sided weakness     Diastolic CHF (H) 9/4/2012     Dilantin toxicity 5/31/2013     Esophageal candidiasis (H)      GERD (gastroesophageal reflux disease)      Heart attack (H)      Hernia, abdominal      History of blood transfusion     x5     History of thrombophlebitis      HTN (hypertension) 9/4/2012     Hyperlipidemia LDL goal <100 9/4/2012     Legally blind in right eye, as defined in [place]     No periphal vision right eye     Osteoporosis 1/21/2013     Other chronic pain      PAD (peripheral artery disease) (H)      Palpitations      Person who has had sex change operation 1/20/2014     Pneumonia      Schizoaffective disorder (H)      Seizure disorder (H) 9/4/2012     Seizures (H)      Sleep apnea     CPAP     Thrombosis of leg      Type 2 diabetes, HbA1C goal < 8% (H) 9/4/2012     Uncomplicated asthma      Unspecified cerebral artery occlusion with cerebral infarction        PAST SURGICAL HISTORY:  Past Surgical History:   Procedure Laterality Date     AMPUTATE LEG ABOVE KNEE Left 6/11/2016    Procedure: AMPUTATE LEG ABOVE KNEE;  Surgeon: Mello Rodriguez MD;  [facility]     AMPUTATE LEG BELOW KNEE Right 11/7/2016    Procedure: AMPUTATE LEG BELOW KNEE;  Surgeon: Savannah Durant MD;  [facility]     AMPUTATE REVISION STUMP LOWER EXTREMITY Right 11/11/2016    Procedure: AMPUTATE REVISION STUMP LOWER EXTREMITY;  Surgeon: Savannah Durant MD;  [facility]     AMPUTATE REVISION STUMP LOWER EXTREMITY Right 11/16/2016    Procedure: AMPUTATE REVISION STUMP LOWER EXTREMITY;  Surgeon: Savannah Durant MD;  [facility]     AMPUTATE TOE(S) Right 1/5/2016    Procedure: AMPUTATE TOE(S);  Surgeon: Mello Gaines DPM;  Location:  [facility]     ANGIOGRAM Bilateral 11/21/2014    Procedure: ANGIOGRAM;  Surgeon:  Savannah Duratn MD;  [facility]     ANGIOGRAM Left 1/16/2015    Procedure: ANGIOGRAM;  Surgeon: Savannah Durant MD;  [facility]     ANGIOGRAM Bilateral 9/14/2015    Procedure: ANGIOGRAM;  Surgeon: Savannah Durant MD;  [facility]     ANGIOGRAM Left 10/12/2015    Procedure: ANGIOGRAM;  Surgeon: Savannah Durant MD;  [facility]     ANGIOGRAM Right 6/6/2016    Procedure: ANGIOGRAM;  Surgeon: Savannah Durant MD;  [facility]     ANGIOPLASTY Right 6/6/2016    Procedure: ANGIOPLASTY;  Surgeon: Savannah Durant MD;  [facility]     APPENDECTOMY       BREAST SURGERY      right breast bx (benign)     CHOLECYSTECTOMY       COLONOSCOPY N/A 8/25/2014    Procedure: COLONOSCOPY;  Surgeon: Mello Ferrer MD;  [facility]     COLONOSCOPY WITH CO2 INSUFFLATION N/A 8/20/2014    Procedure: COLONOSCOPY WITH CO2 INSUFFLATION;  Surgeon: Duane, William Charles, MD;  [facility]     ENDARTERECTOMY FEMORAL  5/23/2014    Procedure: ENDARTERECTOMY FEMORAL;  Surgeon: Jason Joshi MD;  [facility]     ESOPHAGOSCOPY, GASTROSCOPY, DUODENOSCOPY (EGD), COMBINED  12/14/2012    Procedure: COMBINED ESOPHAGOSCOPY, GASTROSCOPY, DUODENOSCOPY (EGD), BIOPSY SINGLE OR MULTIPLE;  ESOPHAGOSCOPY, GASTROSCOPY, DUODENOSCOPY (EGD), DILATATION ;  Surgeon: Elizabeth Stevenson MD;  Location:  GI     ESOPHAGOSCOPY, GASTROSCOPY, DUODENOSCOPY (EGD), COMBINED  12/31/2013    Procedure: COMBINED ESOPHAGOSCOPY, GASTROSCOPY, DUODENOSCOPY (EGD), BIOPSY SINGLE OR MULTIPLE;;  Surgeon: Clemente Lopez MD;  [facility]     ESOPHAGOSCOPY, GASTROSCOPY, DUODENOSCOPY (EGD), COMBINED  4/1/2014    Procedure: COMBINED ESOPHAGOSCOPY, GASTROSCOPY, DUODENOSCOPY (EGD);;  Surgeon: Clemente Lopez MD;  [facility]     ESOPHAGOSCOPY, GASTROSCOPY, DUODENOSCOPY (EGD), COMBINED  6/28/2014    Procedure: COMBINED ESOPHAGOSCOPY, GASTROSCOPY, DUODENOSCOPY (EGD);  Surgeon: Clemente Lopez MD;  [facility]     ESOPHAGOSCOPY, GASTROSCOPY, DUODENOSCOPY  (EGD), COMBINED N/A 8/20/2014    Procedure: COMBINED ESOPHAGOSCOPY, GASTROSCOPY, DUODENOSCOPY (EGD), BIOPSY SINGLE OR MULTIPLE;  Surgeon: Duane, William Charles, MD;  Location:  OR     ESOPHAGOSCOPY, GASTROSCOPY, DUODENOSCOPY (EGD), COMBINED N/A 8/22/2014    Procedure: COMBINED ESOPHAGOSCOPY, GASTROSCOPY, DUODENOSCOPY (EGD), BIOPSY SINGLE OR MULTIPLE;  Surgeon: Mello Ferrer MD;  [facility]     ESOPHAGOSCOPY, GASTROSCOPY, DUODENOSCOPY (EGD), COMBINED N/A 10/2/2014    Procedure: COMBINED ESOPHAGOSCOPY, GASTROSCOPY, DUODENOSCOPY (EGD), BIOPSY SINGLE OR MULTIPLE;  Surgeon: Remy Haskins MD;  Location:  GI     ESOPHAGOSCOPY, GASTROSCOPY, DUODENOSCOPY (EGD), COMBINED Left 12/15/2014    Procedure: COMBINED ESOPHAGOSCOPY, GASTROSCOPY, DUODENOSCOPY (EGD), BIOPSY SINGLE OR MULTIPLE;  Surgeon: Remy Haskins MD;  [facility]     ESOPHAGOSCOPY, GASTROSCOPY, DUODENOSCOPY (EGD), COMBINED N/A 2/25/2015    Procedure: COMBINED ENDOSCOPIC ULTRASOUND, ESOPHAGOSCOPY, GASTROSCOPY, DUODENOSCOPY (EGD), FINE NEEDLE ASPIRATE/BIOPSY;  Surgeon: Clemente Lugo MD;  Location:  [facility]     ESOPHAGOSCOPY, GASTROSCOPY, DUODENOSCOPY (EGD), COMBINED Left 2/25/2015    Procedure: COMBINED ESOPHAGOSCOPY, GASTROSCOPY, DUODENOSCOPY (EGD), BIOPSY SINGLE OR MULTIPLE;  Surgeon: Clemente Lugo MD;  [facility]     ESOPHAGOSCOPY, GASTROSCOPY, DUODENOSCOPY (EGD), COMBINED N/A 9/25/2016    Procedure: COMBINED ESOPHAGOSCOPY, GASTROSCOPY, DUODENOSCOPY (EGD);  Surgeon: Aziza Patiño MD;  Location:  GI     ESOPHAGOSCOPY, GASTROSCOPY, DUODENOSCOPY (EGD), COMBINED N/A 1/18/2017    Procedure: COMBINED ESOPHAGOSCOPY, GASTROSCOPY, DUODENOSCOPY (EGD), BIOPSY SINGLE OR MULTIPLE;  Surgeon: Clemente Lopez MD;  [facility]     FASCIOTOMY LOWER EXTREMITY Left 6/10/2016    Procedure: FASCIOTOMY LOWER EXTREMITY;  Surgeon: Mello Rodriguez MD;  [facility]      CAPSULE ENDOSCOPY N/A 8/25/2014    Procedure:  CAPSULE/PILL CAM ENDOSCOPY;  Surgeon: Remy Haskins MD;  Location:  GI     HC CAPSULE ENDOSCOPY N/A 10/2/2014    Procedure: CAPSULE/PILL CAM ENDOSCOPY;  Surgeon: Remy Haskins MD;  Location:  GI     ORTHOPEDIC SURGERY      broken wrist repair     SEX TRANSFORMATION SURGERY, MALE TO FEMALE      1974     SINUS SURGERY      cyst removed     TONSILLECTOMY       VASCULAR SURGERY      Left carotid stent       FAMILY HISTORY:  Family History   Problem Relation Age of Onset     Dementia Mother      Glaucoma Mother      DIABETES Mother      may have been type 1, childhood     Coronary Artery Disease Mother      MI     Glaucoma Father      DIABETES Father      may hev been type 1 - ??     Heart Failure Father      CANCER Maternal Aunt      leukemia     Schizophrenia Brother      Depression Brother      Suicide Sister      Depression Sister      DIABETES Sister      CANCER Maternal Aunt      ovarian     Glaucoma Maternal Grandmother      DIABETES Maternal Grandmother      Glaucoma Maternal Grandfather      DIABETES Maternal Grandfather      Glaucoma Paternal Grandmother      DIABETES Paternal Grandmother      Glaucoma Paternal Grandfather      DIABETES Paternal Grandfather      Breast Cancer Sister      CEREBROVASCULAR DISEASE Brother      Colon Cancer No family hx of        SOCIAL HISTORY:  Social History     Social History     Marital status:      Spouse name: Jayde     Number of children: 2     Years of education: N/A     Occupational History     mental health worker      retired     Social History Main Topics     Smoking status: Former Smoker     Packs/day: 2.50     Years: 30.00     Types: Cigarettes, Cigars     Quit date: 11/1/2000     Smokeless tobacco: Never Used     Alcohol use No     Drug use: No     Sexual activity: Not Currently     Other Topics Concern     Caffeine Concern No     1 in the morning     Social History Narrative    The patient is a former smoker.  She smoked 2.5 packs per  "year for approximately 30 years.  She quit in  using Wellbutrin and patches.  Her father was in the  and she moved around a lot when she was a kid, and has lived abroad including in [place] and the [facility].  She was previously employed as a mental health worker. Moved from [place] to [place] in . She is currently living in a senior living apartment, and describes her relationship status as a \" demarco couple.\"  She manages her own medications.  She has no recent travel and no known TB exposures.      Addendum 2017: reports her wife  this spring.       Review of Systems:  Skin:  Positive for bruising     Eyes:  Positive for glasses;glaucoma    ENT:  Negative      Respiratory:  Positive for dyspnea on exertion;cough     Cardiovascular:    Positive for;lightheadedness;palpitations    Gastroenterology: Positive for heartburn;reflux    Genitourinary:  not assessed      Musculoskeletal:  Positive for back pain;joint pain;arthritis    Neurologic:  Positive for headaches    Psychiatric:  Negative      Heme/Lymph/Imm:  Positive for allergies    Endocrine:  Positive for diabetes      Physical Exam:  Vitals: /72  Pulse 80  Ht 1.702 m (5' 7\")    Constitutional:  cooperative, alert and oriented, well developed, well nourished, in no acute distress   Examined in a wheelchair    Skin:  warm and dry to the touch, no apparent skin lesions or masses noted        Head:  normocephalic, no masses or lesions        Eyes:  pupils equal and round, conjunctivae and lids unremarkable, sclera white, no xanthalasma, EOMS intact, no nystagmus        ENT:  no pallor or cyanosis        Neck:  carotid pulses are full and equal bilaterally;JVP normal bilateral carotid bruit      Chest:  normal breath sounds, clear to auscultation, normal A-P diameter, normal symmetry, normal respiratory excursion, no use of accessory muscles          Cardiac: regular rhythm;normal S1 and S2;apical impulse not displaced   S4 " no presence of murmur            Abdomen:  abdomen soft, non-tender, BS normoactive, no mass, no HSM, no bruits        Vascular:                                          Extremities and Back:  normal muscle strength and tone;no spinal abnormalities noted         bilateral AKA    Neurological:  affect appropriate, oriented to time, person and place   examined in a wheelchair          CC  Bj Anderson MD  [id number]  [place] [id number]

## 2017-09-27 NOTE — MR AVS SNAPSHOT
After Visit Summary   9/27/2017    Sonya Foote    MRN: [id number]           Patient Information     Date Of Birth          1950        Visit Information        Provider Department      9/27/2017 11:15 AM Bj Anderson MD [facility] [place]        Today's Diagnoses     Coronary artery disease of native artery of native heart with stable angina pectoris (H)        Old myocardial infarction        Chronic systolic congestive heart failure (H)        Essential hypertension with goal blood pressure less than 130/80        Hyperlipidemia LDL goal <70           Follow-ups after your visit        Additional Services     Follow-Up with Cardiologist                 Your next 10 appointments already scheduled     Oct 03, 2017 11:20 AM CDT   Office Visit with Allan Casey MD   [facility] ([facility])    [address] [address] [phone]           Bring a current list of meds and any records pertaining to this visit. For Physicals, please bring immunization records and any forms needing to be filled out. Please arrive 10 minutes early to complete paperwork.            Oct 11, 2017 10:30 AM CDT   NUTRITION VISIT with Melonie Guerrero RD   [facility] ([facility] [place])    [address]  [address]th [facility] [address] [phone]            Oct 18, 2017 12:00 PM CDT   Return Visit with Elizabeth Oliver MD   [facility] ([facility])    [address]  [address]  [facility] [address]   [phone]            Nov 02, 2017  9:15 AM CDT   RETURN GLAUCOMA with Marely Robin MD   [facility] ([facility])    [address]  [address]  [address] [address]  [facility] [place] [facility] [address]   [phone]            Nov 10, 2017  9:20 AM CST   (Arrive by 9:05 AM)    RETURN DIABETES with Michelle Irizarry MD   [facility] ([facility])    [address]  3rd Floor  [facility] [address]   [phone]            Dec 29, 2017 10:00 AM CST   (Arrive by 9:45 AM)   New Patient Visit with VICTOR M Floyd CNP   [facility] ([facility])    [address]  4th Floor  [facility] [address]   [phone]            Feb 19, 2018  9:30 AM CST   (Arrive by 9:15 AM)   Return Visit with Alina Jennings MD   [facility] ([facility])    [address]  [address]rd [facility] [address]   [phone]              Future tests that were ordered for you today     Open Future Orders        Priority Expected Expires Ordered    Follow-Up with Cardiologist Routine 6/24/2018 9/27/2018 9/27/2017    NM Lexiscan stress test (nuc card) Routine 10/4/2017 9/27/2018 9/27/2017            Who to contact     If you have questions or need follow up information about today's clinic visit or your schedule please contact [facility] [place] directly at [phone].  Normal or non-critical lab and imaging results will be communicated to you by Vertrahart, letter or phone within 4 business days after the clinic has received the results. If you do not hear from us within 7 days, please contact the clinic through MyChart or phone. If you have a critical or abnormal lab result, we will notify you by phone as soon as possible.  Submit refill requests through Daily Secret or call your pharmacy and they will forward the refill request to us. Please allow 3 business days for your refill to be completed.          Additional Information About Your Visit        Daily Secret Information     Daily Secret lets you send messages to your doctor, view your test results, renew your prescriptions, schedule appointments  "and more. To sign up, go to [email or web address] . Click on \"Log in\" on the left side of the screen, which will take you to the Welcome page. Then click on \"Sign up Now\" on the right side of the page.     You will be asked to enter the access code listed below, as well as some personal information. Please follow the directions to create your username and password.     Your access code is: [email or web address]  Expires: 10/17/2017  1:21 PM     Your access code will  in 90 days. If you need help or a new code, please call your [place] clinic or [phone].        Care EveryWhere ID     This is your Care EveryWhere ID. This could be used by other organizations to access your [place] medical records  [id number]        Your Vitals Were     Pulse Height                79 1.702 m (5' 7\")           Blood Pressure from Last 3 Encounters:   17 123/72   17 113/68   17 135/80    Weight from Last 3 Encounters:   17 67.1 kg (148 lb)   17 60.3 kg (133 lb)   17 60.3 kg (133 lb)              We Performed the Following     Follow-Up with Cardiologist          Today's Medication Changes          These changes are accurate as of: 17 11:46 AM.  If you have any questions, ask your nurse or doctor.               These medicines have changed or have updated prescriptions.        Dose/Directions    aspirin 81 MG EC tablet   This may have changed:  when to take this   Used for:  Unstable angina (H)        Dose:  81 mg   Take 1 tablet (81 mg) by mouth daily   Quantity:  90 tablet   Refills:  3       atorvastatin 40 MG tablet   Commonly known as:  LIPITOR   This may have changed:  when to take this   Used for:  Hyperlipidemia LDL goal <100        Dose:  40 mg   Take 1 tablet (40 mg) by mouth daily   Quantity:  90 tablet   Refills:  3       hydrochlorothiazide 12.5 MG capsule   Commonly known as:  MICROZIDE   This may have changed:  additional instructions   Used for:  Essential hypertension with " goal blood pressure less than 130/80        Dose:  12.5 mg   Take 1 capsule (12.5 mg) by mouth daily   Quantity:  90 capsule   Refills:  3       ondansetron 4 MG ODT tab   Commonly known as:  ZOFRAN-ODT   This may have changed:    - when to take this  - reasons to take this   Used for:  Intractable vomiting with nausea, unspecified vomiting type        Dose:  4 mg   Take 1 tablet (4 mg) by mouth every 6 hours   Quantity:  120 tablet   Refills:  0       Phenytoin Sodium Extended 200 MG Caps   This may have changed:  additional instructions   Used for:  Seizure disorder (H)        Dose:  200 mg   Take 200 mg by mouth 2 times daily   Quantity:  60 capsule   Refills:  0                Primary Care Provider Office Phone # Fax #    Allan Casey [phone]       [address] [place] [address]        Equal Access to Services     KARI CARREON : John youo Sojoe, waaxda luqadaha, qaybta kaalmada shashank, tonie marvin . So [facility] [phone].    ATENCIÓN: Si habla español, tiene a briones disposición servicios gratuitos de asistencia lingüística. [facility] [phone].    We comply with applicable federal civil rights laws and [place] laws. We do not discriminate on the basis of race, color, national origin, age, disability sex, sexual orientation or gender identity.            Thank you!     Thank you for choosing [facility] [place]  for your care. Our goal is always to provide you with excellent care. Hearing back from our patients is one way we can continue to improve our services. Please take a few minutes to complete the written survey that you may receive in the mail after your visit with us. Thank you!             Your Updated Medication List - Protect others around you: Learn how to safely use, store and throw away your medicines at www.disposemymeds.org.          This list is accurate as of: 9/27/17 11:46 AM.   Always use your most recent med list.                   Brand Name Dispense Instructions for use Diagnosis    ACETAMINOPHEN PO      Take 1,000 mg by mouth every 6 hours as needed for fever Taking q4-6 hours, per MAR        ADVAIR DISKUS 250-50 MCG/DOSE diskus inhaler   Generic drug:  fluticasone-salmeterol      Inhale 1 puff into the lungs 2 times daily        albuterol (2.5 MG/3ML) 0.083% neb solution     360 mL    INHALE 1 VIAL VIA NEBULIZER EVERY 6 HOURS AS NEEDED    Chronic obstructive pulmonary disease, unspecified COPD type (H)       aspirin 81 MG EC tablet     90 tablet    Take 1 tablet (81 mg) by mouth daily    Unstable angina (H)       atorvastatin 40 MG tablet    LIPITOR    90 tablet    Take 1 tablet (40 mg) by mouth daily    Hyperlipidemia LDL goal <100       blood glucose monitoring meter device kit     1 kit    Use to test blood sugars 3 times daily or as directed.    Type 2 diabetes, HbA1C goal < 8% (H)       blood glucose monitoring test strip    ONE TOUCH ULTRA    3 Box    Use to test blood sugars 3 times daily or as directed.    Type 2 diabetes mellitus with diabetic peripheral angiopathy without gangrene, without long-term current use of insulin (H)       calcium citrate-vitamin D 315-250 MG-UNIT Tabs per tablet    CITRACAL    120 tablet    Take 2 tablets by mouth daily    Osteoporosis       cetirizine 10 MG tablet    zyrTEC    90 tablet    Take 1 tablet (10 mg) by mouth daily as needed for allergies    Seasonal allergic rhinitis, unspecified allergic rhinitis trigger       clopidogrel 75 MG tablet    PLAVIX    90 tablet    Take 1 tablet (75 mg) by mouth daily    PAD (peripheral artery disease) (H), UGI bleed, Osteoporosis, Nausea       CYANOCOBALAMIN PO      Take 2,000 mcg by mouth daily        cyclobenzaprine 10 MG tablet    FLEXERIL    30 tablet    TAKE ONE HALF TO ONE WHOLE TABLET BY MOUTH THREE TIMES A DAY AS NEEDED FOR MUSCLE SPASMS    Cervicalgia       diclofenac 1 % Gel topical gel     VOLTAREN    100 g    Apply 2 g topically 4 times daily to hands    Primary osteoarthritis of both hands       dorzolamide 2 % ophthalmic solution    TRUSOPT     Place 1 drop into both eyes 2 times daily        * EASY TOUCH LANCETS 30G/TWIST Misc           * blood glucose monitoring lancets     100 each    Use to test blood sugar 3 times daily or as directed.    Type 2 diabetes mellitus with diabetic peripheral angiopathy without gangrene, without long-term current use of insulin (H)       EPINEPHrine 0.15 MG/0.3ML injection 2-pack    EPIPEN JR    0.6 mL    Inject 0.3 mLs (0.15 mg) into the muscle as needed for anaphylaxis    Bee sting reaction, undetermined intent, subsequent encounter       ESCITALOPRAM OXALATE PO      Take 10 mg by mouth daily        estradiol 0.1 MG/GM cream    ESTRACE VAGINAL    42.5 g    Use dime size amount 3x a week at night    Personal history of urinary tract infection       estradiol 1 MG tablet    ESTRACE    90 tablet    Take 1 tablet (1 mg) by mouth daily    Person who has had sex change operation       ferrous sulfate 325 (65 FE) MG tablet    IRON    60 tablet    Take 1 tablet (325 mg) by mouth 2 times daily With meals        fluticasone 50 MCG/ACT spray    FLONASE     Spray 1 spray into both nostrils daily        hydrochlorothiazide 12.5 MG capsule    MICROZIDE    90 capsule    Take 1 capsule (12.5 mg) by mouth daily    Essential hypertension with goal blood pressure less than 130/80       RENÉE Pack      Take 1 packet by mouth 2 times daily        latanoprost 0.005 % ophthalmic solution    XALATAN    2.5 mL    Place 1 drop into both eyes At Bedtime    Primary open angle glaucoma, stage unspecified       levETIRAcetam 500 MG tablet    KEPPRA    180 tablet    Take 1 tablet (500 mg) by mouth 2 times daily    Nausea       lidocaine 5 % Patch    LIDODERM    30 patch    APPLY 1 TO 3 PATCHES TO PAINFUL AREA AT ONCE FOR UP TO 12 HOURS WITHIN A 24 HOUR PERIOD REMOVE AFTER 12 HOURS    Chronic  pain syndrome, 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 (H)       lisinopril 10 MG tablet    PRINIVIL/ZESTRIL    90 tablet    Take 1 tablet (10 mg) by mouth daily    Essential hypertension with goal blood pressure less than 130/80       MEDICATION GIVEN BY INTRATHECAL PUMP - INSTRUCTION      continuous May 19, 2017 - per [facility] [place] [phone]: Conc: Bupivacaine 20 mg/mL and Fentanyl 2000 mcg/mL. Continuous: Bupivacaine 7.265 mg/day and Fentanyl 726.5 mcg/day. Boluses: Up to 7 boluses per 24-hr period of Bupivicaine 0.599 mg and Fentanyl 59.9 mcg  Pump Refill Date at [facility]: 7/2/17        metoprolol 25 MG 24 hr tablet    TOPROL-XL    30 tablet    TAKE 1 TABLET (25 MG) BY MOUTH DAILY    Old myocardial infarction       MULTIVITAMIN PO      Take 1 tablet by mouth daily        nitroFURantoin (macrocrystal-monohydrate) 100 MG capsule    MACROBID    14 capsule    Take 1 capsule (100 mg) by mouth 2 times daily    Acute cystitis without hematuria       nitroGLYcerin 0.4 MG sublingual tablet    NITROSTAT    25 tablet    Place 1 tablet (0.4 mg) under the tongue every 5 minutes as needed for chest pain if you are still having symptoms after 3 doses (15 minutes) call 911.    Chronic systolic congestive heart failure (H), Old myocardial infarction       ondansetron 4 MG ODT tab    ZOFRAN-ODT    120 tablet    Take 1 tablet (4 mg) by mouth every 6 hours    Intractable vomiting with nausea, unspecified vomiting type       * order for DME     1 Month    Equipment being ordered: Depends briefs    Incontinence       * order for DME     1 Device    Equipment being ordered: Power Wheelchair    CVA (cerebral infarction), HTN (hypertension)       * order for DME     1 Units    Equipment being ordered: Nebulizer and supplies    Obstructive chronic bronchitis with exacerbation (H)       * order for DME     1 Box    Equipment being ordered: Glucerna daily shakes with each  "meal    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)       * order for DME     1 Device    ACcu check BID    Type 2 diabetes mellitus with diabetic peripheral angiopathy without gangrene, without long-term current use of insulin (H)       * order for DME     1 Units    Equipment being ordered: Nebulizer and tubing supplies    Simple chronic bronchitis (H)       * order for DME     1 Device    Equipment being ordered: CPAP supplies mask, hose, filters, cushion fax to [facility] at [phone] Disp: 10 DeviceRefills: prn Class: Local PrintStart: 2/10/2017    Chronic obstructive pulmonary disease, unspecified COPD type (H)       * order for DME     10 Device    Equipment being ordered: CPAP supplies mask, hose, filters, cushion  fax to [facility] at [phone]    COPD (chronic obstructive pulmonary disease) (H)       * order for DME     1 Device    Equipment being ordered: wheelchair seat cushion    Critical lower limb ischemia       * order for DME     1 Device    roho w/c cushion 18\" X 18\" for pressure relief    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 (H)       * order for DME     1 Device    Hospital bed with side rails    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 (H)       * order for DME     1 Device    Full electric hospital bed with half rails  Dx: V70137, I110, J449 Length of need: lifetime    Status post bilateral above knee amputation (H)       * order for DME     1 Device    Wheel Chair Cushion: 18 x 18 inch Roho cushion    Status post bilateral above knee amputation (H)       pantoprazole 40 MG EC tablet    PROTONIX     Take 40 mg by mouth daily        Phenytoin Sodium Extended 200 MG Caps     60 capsule    Take 200 mg by mouth 2 times daily    Seizure disorder (H)       polyethylene glycol Packet    MIRALAX/GLYCOLAX     Take 17 g by mouth daily Dissolved in water or juice        * pregabalin 75 MG capsule    LYRICA    120 capsule "    Take 2 capsules (150 mg) by mouth 2 times daily    Pain in both upper extremities       * LYRICA 100 MG capsule   Generic drug:  pregabalin           prochlorperazine 10 MG tablet    COMPAZINE    20 tablet    Take 1 tablet (10 mg) by mouth every 8 hours as needed    Nausea with vomiting       risperiDONE 0.5 MG tablet    risperDAL     Take 0.5 mg by mouth At Bedtime        SPIRIVA HANDIHALER 18 MCG capsule   Generic drug:  tiotropium           sucralfate 1 GM tablet    CARAFATE    40 tablet    Take 1 tablet (1 g) by mouth 4 times daily May dissolve in 10 mL water is necessary. (Start upon completion of carafate suspension)    Gastroesophageal reflux disease without esophagitis       * traZODone 100 MG tablet    DESYREL    90 tablet    Take 1.5 tablets (150 mg) by mouth At Bedtime    Anxiety state       * traZODone 50 MG tablet    DESYREL          vitamin D 2000 UNITS tablet     100 tablet    Take 2,000 Units by mouth daily.        zinc 50 MG Tabs      Take 1 tablet by mouth daily        * Notice:  This list has 19 medication(s) that are the same as other medications prescribed for you. Read the directions carefully, and ask your doctor or other care provider to review them with you.

## 2017-09-27 NOTE — PROGRESS NOTES
PRIMARY CARE PHYSICIAN:  Allan Casey MD      HISTORY OF PRESENT ILLNESS:  I again had the pleasure of seeing your patient, Sonya Foote, at [facility] for evaluation and treatment of coronary artery disease and panvascular disease.  Sonya Foote is a pleasant 67-year-old -American female with a history of chronic dysphasia, esophageal strictures and previous dilatations.  She underwent a left above the knee amputation for peripheral vascular disease and thrombosis on 06/06/2016 and a right above-the-knee amputation for peripheral arterial disease on 11/23/2016.  The patient presented for unstable angina and inferior wall myocardial infarction to the [facility] in 09/2016.  Her ejection fraction by cardiac MRI in June had been 60%.  She underwent coronary angiography in September of last year with high-grade stenosis of the proximal right coronary artery found.  Intracoronary stenting was performed.  Her ejection fraction fell to 35%-40%.  She has remained on aspirin and Plavix since that time.  She has had intermittent episodes of right and substernal chest pressure relieved with sublingual nitroglycerin.  The patient believes she had a previous myocardial infarction in 2005 at the time of a hemorrhagic stroke and kidney failure.  There was evidence of LAD distribution akinesis as well as the inferior wall akinesis on echocardiogram in December of last year.  The patient denies significant shortness of breath.  She remains at assisted living.  Her medications are all reviewed.  She has ongoing cerebral arterial disease including her carotids which are being followed by Vascular Surgery.  The patient is status post previous stenting of her left carotid artery in 04/2016.  She notes that she has returned to smoking 3 cigarettes per day.  We talked about this for close to 7 minutes and I gave her reasons to quit and we discussed cessation techniques.      PHYSICAL EXAMINATION:   As listed below.      ASSESSMENT:   1.  Sharath Mercedes is a delightful 67-year-old female who presents for coronary artery disease after an inferior wall myocardial infarction in 2016.  There is no evidence of congestive heart failure.  We are going to repeat a Lexiscan nuclear stress test to assess ongoing ischemia.  If she does not have ischemia, we will plan on stopping the Plavix.  If her ejection fraction is below 35%, we will consider implantation of an AICD.   2.  Diabetes mellitus, being cared for by her primary care physicians.   3.  Peripheral arterial disease, status post bilateral above the knee amputations.   4.  Systolic hypertension, currently under reasonably good control.   5.  Tobacco abuse.  I discussed cessation with her and included both reasons and techniques to quit.  She notes considerable stress in her life including the loss of her significant other.  She also uses E-cigarettes.      It is my pleasure to assist in the care of Sharath Mercedes.  All her questions were answered to her satisfaction.  I will see her again in 9 months if she remains stable.      Rosario Gaines MD      cc:   [place] [address]         ROSARIO GAINES MD, FACC             D: 2017 11:56   T: 2017 14:25   MT: al      Name:     SHARATH MERCEDES   MRN:      [id number]        Account:      [id number]   :      1950           Service Date: 2017      Document: [id number]

## 2017-09-29 ENCOUNTER — MEDICAL CORRESPONDENCE (OUTPATIENT)
Dept: HEALTH INFORMATION MANAGEMENT | Facility: CLINIC | Age: 67
End: 2017-09-29

## 2017-10-02 ENCOUNTER — HOSPITAL ENCOUNTER (OUTPATIENT)
Dept: CARDIOLOGY | Facility: CLINIC | Age: 67
Discharge: HOME | End: 2017-10-02
Attending: INTERNAL MEDICINE | Admitting: INTERNAL MEDICINE
Payer: COMMERCIAL

## 2017-10-02 VITALS — HEART RATE: 73 BPM | DIASTOLIC BLOOD PRESSURE: 50 MMHG | SYSTOLIC BLOOD PRESSURE: 102 MMHG

## 2017-10-02 DIAGNOSIS — I25.2 OLD MYOCARDIAL INFARCTION: ICD-10-CM

## 2017-10-02 DIAGNOSIS — I25.118 CORONARY ARTERY DISEASE OF NATIVE ARTERY OF NATIVE HEART WITH STABLE ANGINA PECTORIS (H): ICD-10-CM

## 2017-10-02 PROCEDURE — 34300033 ZZH RX 343: Performed by: INTERNAL MEDICINE

## 2017-10-02 PROCEDURE — 93016 CV STRESS TEST SUPVJ ONLY: CPT | Performed by: INTERNAL MEDICINE

## 2017-10-02 PROCEDURE — A9502 TC99M TETROFOSMIN: HCPCS | Performed by: INTERNAL MEDICINE

## 2017-10-02 PROCEDURE — 25000128 H RX IP 250 OP 636: Performed by: INTERNAL MEDICINE

## 2017-10-02 PROCEDURE — 93017 CV STRESS TEST TRACING ONLY: CPT

## 2017-10-02 PROCEDURE — 78452 HT MUSCLE IMAGE SPECT MULT: CPT | Mod: 26 | Performed by: INTERNAL MEDICINE

## 2017-10-02 PROCEDURE — 93018 CV STRESS TEST I&R ONLY: CPT | Performed by: INTERNAL MEDICINE

## 2017-10-02 RX ORDER — REGADENOSON 0.08 MG/ML
0.4 INJECTION, SOLUTION INTRAVENOUS ONCE
Status: COMPLETED | OUTPATIENT
Start: 2017-10-02 | End: 2017-10-02

## 2017-10-02 RX ORDER — AMINOPHYLLINE 25 MG/ML
50-100 INJECTION, SOLUTION INTRAVENOUS
Status: DISCONTINUED | OUTPATIENT
Start: 2017-10-02 | End: 2017-10-03 | Stop reason: HOSPADM

## 2017-10-02 RX ORDER — ACYCLOVIR 200 MG/1
0-1 CAPSULE ORAL
Status: DISCONTINUED | OUTPATIENT
Start: 2017-10-02 | End: 2017-10-03 | Stop reason: HOSPADM

## 2017-10-02 RX ORDER — ALBUTEROL SULFATE 90 UG/1
2 AEROSOL, METERED RESPIRATORY (INHALATION) EVERY 5 MIN PRN
Status: DISCONTINUED | OUTPATIENT
Start: 2017-10-02 | End: 2017-10-03 | Stop reason: HOSPADM

## 2017-10-02 RX ADMIN — TETROFOSMIN 9.61 MCI.: 1.38 INJECTION, POWDER, LYOPHILIZED, FOR SOLUTION INTRAVENOUS at 14:16

## 2017-10-02 RX ADMIN — REGADENOSON 0.4 MG: 0.08 INJECTION, SOLUTION INTRAVENOUS at 14:09

## 2017-10-02 RX ADMIN — TETROFOSMIN 3.6 MCI.: 1.38 INJECTION, POWDER, LYOPHILIZED, FOR SOLUTION INTRAVENOUS at 12:33

## 2017-10-03 ENCOUNTER — OFFICE VISIT (OUTPATIENT)
Dept: INTERNAL MEDICINE | Facility: CLINIC | Age: 67
End: 2017-10-03
Payer: COMMERCIAL

## 2017-10-03 ENCOUNTER — TELEPHONE (OUTPATIENT)
Dept: CARDIOLOGY | Facility: CLINIC | Age: 67
End: 2017-10-03

## 2017-10-03 VITALS
DIASTOLIC BLOOD PRESSURE: 84 MMHG | HEART RATE: 80 BPM | SYSTOLIC BLOOD PRESSURE: 148 MMHG | OXYGEN SATURATION: 98 % | BODY MASS INDEX: 23.18 KG/M2 | TEMPERATURE: 98.7 F | WEIGHT: 148 LBS

## 2017-10-03 DIAGNOSIS — M54.2 CERVICALGIA: Primary | ICD-10-CM

## 2017-10-03 DIAGNOSIS — R35.0 URINARY FREQUENCY: ICD-10-CM

## 2017-10-03 PROCEDURE — 99213 OFFICE O/P EST LOW 20 MIN: CPT | Performed by: INTERNAL MEDICINE

## 2017-10-03 RX ORDER — CYCLOBENZAPRINE HCL 10 MG
10 TABLET ORAL 2 TIMES DAILY PRN
Qty: 30 TABLET | Refills: 1 | Status: SHIPPED | OUTPATIENT
Start: 2017-10-03 | End: 2018-01-04

## 2017-10-03 NOTE — TELEPHONE ENCOUNTER
Notes Recorded by Bj Anderson MD on 10/2/2017 at 10:20 PM  Inferior wall MI noted without significant ischemia.  Would suggest stopping the Plavix.  Bj Anderson MD, [facility]  October 2, 2017 10:20 PM    Spoke to patient about results above. Pt verbalized understanding and will stop Plavix. Pt requested that writer call nursing station at her nursing home to update them.     [phone] ask for nurses station. Writer spoke to RN at nursing home. They requested writer fax note stating it is ok to DC plavix. Result with Dr. Recinos suggestion faxed to [phone]

## 2017-10-03 NOTE — NURSING NOTE
"Chief Complaint   Patient presents with     Derm Problem       Initial /84  Pulse 80  Temp 98.7  F (37.1  C) (Oral)  Wt 148 lb (67.1 kg)  SpO2 98%  BMI 23.18 kg/m2 Estimated body mass index is 23.18 kg/(m^2) as calculated from the following:    Height as of 9/27/17: 5' 7\" (1.702 m).    Weight as of this encounter: 148 lb (67.1 kg).  Medication Reconciliation: complete'Daniela Hancock, ALEKSEY      "

## 2017-10-03 NOTE — MR AVS SNAPSHOT
After Visit Summary   10/3/2017    Sonya Foote    MRN: [id number]           Patient Information     Date Of Birth          1950        Visit Information        Provider Department      10/3/2017 11:20 AM Allan Casey MD [facility]        Today's Diagnoses     Cervicalgia    -  1    Urinary frequency           Follow-ups after your visit        Follow-up notes from your care team     Return if symptoms worsen or fail to improve.      Your next 10 appointments already scheduled     Oct 11, 2017 10:30 AM CDT   NUTRITION VISIT with Melonie Guerrero RD   [facility] ([facility])    [address] [place] [address]  4th [facility] [address]   [phone]            Oct 18, 2017 12:00 PM CDT   Return Visit with Elizabeth Oliver MD   [facility] ([facility])    [address] [facility] [address]  [address]  [facility] [address]   [phone]            Nov 02, 2017  9:15 AM CDT   RETURN GLAUCOMA with Marely Robin MD   [facility] ([facility])    [address]  [address] [facility]  [address]th [address] Clin [address]  [facility] [address]   [phone]            Nov 10, 2017  9:20 AM CST   (Arrive by 9:05 AM)   RETURN DIABETES with Michelle Irizarry MD   [facility] ([facility])    [address] [place] [address]  3rd [facility] [address]   [phone]            Dec 29, 2017 10:00 AM CST   (Arrive by 9:45 AM)   New Patient Visit with VICTOR M Floyd [facility] ([facility])    [address] [place] [address]  4th [facility] [address]   [phone]            Feb 19, 2018  9:30 AM CST   (Arrive by 9:15 AM)   Return Visit with Alina Jennings MD   [facility] ([facility])    [address] [place] [address]  "[facility] [address] [phone]              Future tests that were ordered for you today     Open Future Orders        Priority Expected Expires Ordered    *UA reflex to Microscopic and Culture ([facility] (except [place] and [place]) Routine 10/3/2017 10/31/2017 10/3/2017            Who to contact     If you have questions or need follow up information about today's clinic visit or your schedule please contact [facility] directly at [phone].  Normal or non-critical lab and imaging results will be communicated to you by Cennoxhart, letter or phone within 4 business days after the clinic has received the results. If you do not hear from us within 7 days, please contact the clinic through Intalet or phone. If you have a critical or abnormal lab result, we will notify you by phone as soon as possible.  Submit refill requests through [employer] or call your pharmacy and they will forward the refill request to us. Please allow 3 business days for your refill to be completed.          Additional Information About Your Visit        CennoxharKoalaDeal Information     [employer] lets you send messages to your doctor, view your test results, renew your prescriptions, schedule appointments and more. To sign up, go to [email or web address] . Click on \"Log in\" on the left side of the screen, which will take you to the Welcome page. Then click on \"Sign up Now\" on the right side of the page.     You will be asked to enter the access code listed below, as well as some personal information. Please follow the directions to create your username and password.     Your access code is: [email or web address]  Expires: 10/17/2017  1:21 PM     Your access code will  in 90 days. If you need help or a new code, please call your [facility] clinic or [phone].        Care EveryWhere ID     This is your Care EveryWhere ID. This could be used by other organizations to access your [facility] " records  [id number]        Your Vitals Were     Pulse Temperature Pulse Oximetry BMI (Body Mass Index)          80 98.7  F (37.1  C) (Oral) 98% 23.18 kg/m2         Blood Pressure from Last 3 Encounters:   10/03/17 148/84   10/02/17 102/50   09/27/17 123/72    Weight from Last 3 Encounters:   10/03/17 148 lb (67.1 kg)   09/08/17 148 lb (67.1 kg)   09/06/17 133 lb (60.3 kg)                 Today's Medication Changes          These changes are accurate as of: 10/3/17 11:45 AM.  If you have any questions, ask your nurse or doctor.               These medicines have changed or have updated prescriptions.        Dose/Directions    aspirin 81 MG EC tablet   This may have changed:  when to take this   Used for:  Unstable angina (H)        Dose:  81 mg   Take 1 tablet (81 mg) by mouth daily   Quantity:  90 tablet   Refills:  3       atorvastatin 40 MG tablet   Commonly known as:  LIPITOR   This may have changed:  when to take this   Used for:  Hyperlipidemia LDL goal <100        Dose:  40 mg   Take 1 tablet (40 mg) by mouth daily   Quantity:  90 tablet   Refills:  3       cyclobenzaprine 10 MG tablet   Commonly known as:  FLEXERIL   This may have changed:  See the new instructions.   Used for:  Cervicalgia   Changed by:  Allan Casey MD        Dose:  10 mg   Take 1 tablet (10 mg) by mouth 2 times daily as needed for muscle spasms   Quantity:  30 tablet   Refills:  1       hydrochlorothiazide 12.5 MG capsule   Commonly known as:  MICROZIDE   This may have changed:  additional instructions   Used for:  Essential hypertension with goal blood pressure less than 130/80        Dose:  12.5 mg   Take 1 capsule (12.5 mg) by mouth daily   Quantity:  90 capsule   Refills:  3       ondansetron 4 MG ODT tab   Commonly known as:  ZOFRAN-ODT   This may have changed:    - when to take this  - reasons to take this   Used for:  Intractable vomiting with nausea, unspecified vomiting type        Dose:  4 mg   Take 1 tablet (4 mg) by mouth  every 6 hours   Quantity:  120 tablet   Refills:  0       Phenytoin Sodium Extended 200 MG Caps   This may have changed:  additional instructions   Used for:  Seizure disorder (H)        Dose:  200 mg   Take 200 mg by mouth 2 times daily   Quantity:  60 capsule   Refills:  0            Where to get your medicines      These medications were sent to [facility] [address]  [address] [address]     Phone:  [phone]     cyclobenzaprine 10 MG tablet                Primary Care Provider Office Phone # Fax #    Allan Casey [phone]       [address]  [facility] [address]        Equal Access to Services     KARI CARREON : Hadii shaheen gastelum hadasho Soomaali, waaxda luqadaha, qaybta kaalmada adeegyada, tonie marvin . So [facility] [phone].    ATENCIÓN: Si habla español, tiene a briones disposición servicios gratuitos de asistencia lingüística. Llame al [phone].    We comply with applicable federal civil rights laws and [place] laws. We do not discriminate on the basis of race, color, national origin, age, disability, sex, sexual orientation, or gender identity.            Thank you!     Thank you for choosing [facility]  for your care. Our goal is always to provide you with excellent care. Hearing back from our patients is one way we can continue to improve our services. Please take a few minutes to complete the written survey that you may receive in the mail after your visit with us. Thank you!             Your Updated Medication List - Protect others around you: Learn how to safely use, store and throw away your medicines at www.disposemymeds.org.          This list is accurate as of: 10/3/17 11:45 AM.  Always use your most recent med list.                   Brand Name Dispense Instructions for use Diagnosis    ACETAMINOPHEN PO      Take 1,000 mg by mouth every 6 hours as needed for  fever Taking q4-6 hours, per MAR        ADVAIR DISKUS 250-50 MCG/DOSE diskus inhaler   Generic drug:  fluticasone-salmeterol      Inhale 1 puff into the lungs 2 times daily        albuterol (2.5 MG/3ML) 0.083% neb solution     360 mL    INHALE 1 VIAL VIA NEBULIZER EVERY 6 HOURS AS NEEDED    Chronic obstructive pulmonary disease, unspecified COPD type (H)       aspirin 81 MG EC tablet     90 tablet    Take 1 tablet (81 mg) by mouth daily    Unstable angina (H)       atorvastatin 40 MG tablet    LIPITOR    90 tablet    Take 1 tablet (40 mg) by mouth daily    Hyperlipidemia LDL goal <100       blood glucose monitoring meter device kit     1 kit    Use to test blood sugars 3 times daily or as directed.    Type 2 diabetes, HbA1C goal < 8% (H)       blood glucose monitoring test strip    ONE TOUCH ULTRA    3 Box    Use to test blood sugars 3 times daily or as directed.    Type 2 diabetes mellitus with diabetic peripheral angiopathy without gangrene, without long-term current use of insulin (H)       calcium citrate-vitamin D 315-250 MG-UNIT Tabs per tablet    CITRACAL    120 tablet    Take 2 tablets by mouth daily    Osteoporosis       cetirizine 10 MG tablet    zyrTEC    90 tablet    Take 1 tablet (10 mg) by mouth daily as needed for allergies    Seasonal allergic rhinitis, unspecified allergic rhinitis trigger       clopidogrel 75 MG tablet    PLAVIX    90 tablet    Take 1 tablet (75 mg) by mouth daily    PAD (peripheral artery disease) (H), UGI bleed, Osteoporosis, Nausea       CYANOCOBALAMIN PO      Take 2,000 mcg by mouth daily        cyclobenzaprine 10 MG tablet    FLEXERIL    30 tablet    Take 1 tablet (10 mg) by mouth 2 times daily as needed for muscle spasms    Cervicalgia       diclofenac 1 % Gel topical gel    VOLTAREN    100 g    Apply 2 g topically 4 times daily to hands    Primary osteoarthritis of both hands       dorzolamide 2 % ophthalmic solution    TRUSOPT     Place 1 drop into both eyes 2 times daily         * EASY TOUCH LANCETS 30G/TWIST Misc           * blood glucose monitoring lancets     100 each    Use to test blood sugar 3 times daily or as directed.    Type 2 diabetes mellitus with diabetic peripheral angiopathy without gangrene, without long-term current use of insulin (H)       EPINEPHrine 0.15 MG/0.3ML injection 2-pack    EPIPEN JR    0.6 mL    Inject 0.3 mLs (0.15 mg) into the muscle as needed for anaphylaxis    Bee sting reaction, undetermined intent, subsequent encounter       ESCITALOPRAM OXALATE PO      Take 10 mg by mouth daily        estradiol 0.1 MG/GM cream    ESTRACE VAGINAL    42.5 g    Use dime size amount 3x a week at night    Personal history of urinary tract infection       estradiol 1 MG tablet    ESTRACE    90 tablet    Take 1 tablet (1 mg) by mouth daily    Person who has had sex change operation       ferrous sulfate 325 (65 FE) MG tablet    IRON    60 tablet    Take 1 tablet (325 mg) by mouth 2 times daily With meals        fluticasone 50 MCG/ACT spray    FLONASE     Spray 1 spray into both nostrils daily        hydrochlorothiazide 12.5 MG capsule    MICROZIDE    90 capsule    Take 1 capsule (12.5 mg) by mouth daily    Essential hypertension with goal blood pressure less than 130/80       RENÉE Pack      Take 1 packet by mouth 2 times daily        latanoprost 0.005 % ophthalmic solution    XALATAN    2.5 mL    Place 1 drop into both eyes At Bedtime    Primary open angle glaucoma, stage unspecified       levETIRAcetam 500 MG tablet    KEPPRA    180 tablet    Take 1 tablet (500 mg) by mouth 2 times daily    Nausea       lidocaine 5 % Patch    LIDODERM    30 patch    APPLY 1 TO 3 PATCHES TO PAINFUL AREA AT ONCE FOR UP TO 12 HOURS WITHIN A 24 HOUR PERIOD REMOVE AFTER 12 HOURS    Chronic pain syndrome, 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 (H)       lisinopril 10 MG tablet    PRINIVIL/ZESTRIL    90 tablet    Take 1 tablet (10 mg) by mouth daily    Essential  hypertension with goal blood pressure less than 130/80       MEDICATION GIVEN BY INTRATHECAL PUMP - INSTRUCTION      continuous May 19, 2017 - per [facility] [place] [phone]: Conc: Bupivacaine 20 mg/mL and Fentanyl 2000 mcg/mL. Continuous: Bupivacaine 7.265 mg/day and Fentanyl 726.5 mcg/day. Boluses: Up to 7 boluses per 24-hr period of Bupivicaine 0.599 mg and Fentanyl 59.9 mcg  Pump Refill Date at [facility]: 7/2/17        metoprolol 25 MG 24 hr tablet    TOPROL-XL    30 tablet    TAKE 1 TABLET (25 MG) BY MOUTH DAILY    Old myocardial infarction       MULTIVITAMIN PO      Take 1 tablet by mouth daily        nitroGLYcerin 0.4 MG sublingual tablet    NITROSTAT    25 tablet    Place 1 tablet (0.4 mg) under the tongue every 5 minutes as needed for chest pain if you are still having symptoms after 3 doses (15 minutes) call 911.    Chronic systolic congestive heart failure (H), Old myocardial infarction       ondansetron 4 MG ODT tab    ZOFRAN-ODT    120 tablet    Take 1 tablet (4 mg) by mouth every 6 hours    Intractable vomiting with nausea, unspecified vomiting type       * order for DME     1 Month    Equipment being ordered: Depends briefs    Incontinence       * order for DME     1 Device    Equipment being ordered: Power Wheelchair    CVA (cerebral infarction), HTN (hypertension)       * order for DME     1 Units    Equipment being ordered: Nebulizer and supplies    Obstructive chronic bronchitis with exacerbation (H)       * order for DME     1 Box    Equipment being ordered: Glucerna daily shakes with each meal    Type 2 diabetes mellitus with other diabetic neurological complication       * order for DME     1 Device    ACcu check BID    Type 2 diabetes mellitus with diabetic peripheral angiopathy without gangrene, without long-term current use of insulin (H)       * order for DME     1 Units    Equipment being ordered: Nebulizer and tubing supplies    Simple chronic  "bronchitis (H)       * order for DME     1 Device    Equipment being ordered: CPAP supplies mask, hose, filters, cushion fax to [facility] at [phone] Disp: 10 DeviceRefills: prn Class: Local PrintStart: 2/10/2017    Chronic obstructive pulmonary disease, unspecified COPD type (H)       * order for DME     10 Device    Equipment being ordered: CPAP supplies mask, hose, filters, cushion  fax to [facility] at [phone]    COPD (chronic obstructive pulmonary disease) (H)       * order for DME     1 Device    Equipment being ordered: wheelchair seat cushion    Critical lower limb ischemia       * order for DME     1 Device    roho w/c cushion 18\" X 18\" for pressure relief    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 (H)       * order for DME     1 Device    Hospital bed with side rails    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 (H)       * order for DME     1 Device    Full electric hospital bed with half rails  Dx: X49225, I110, J449 Length of need: lifetime    Status post bilateral above knee amputation (H)       * order for DME     1 Device    Wheel Chair Cushion: 18 x 18 inch Roho cushion    Status post bilateral above knee amputation (H)       pantoprazole 40 MG EC tablet    PROTONIX     Take 40 mg by mouth daily        Phenytoin Sodium Extended 200 MG Caps     60 capsule    Take 200 mg by mouth 2 times daily    Seizure disorder (H)       polyethylene glycol Packet    MIRALAX/GLYCOLAX     Take 17 g by mouth daily Dissolved in water or juice        * pregabalin 75 MG capsule    LYRICA    120 capsule    Take 2 capsules (150 mg) by mouth 2 times daily    Pain in both upper extremities       * LYRICA 100 MG capsule   Generic drug:  pregabalin           prochlorperazine 10 MG tablet    COMPAZINE    20 tablet    Take 1 tablet (10 mg) by mouth every 8 hours as needed    Nausea with vomiting       risperiDONE 0.5 MG tablet    risperDAL     Take 0.5 mg by mouth At Bedtime "        SPIRIVA HANDIHALER 18 MCG capsule   Generic drug:  tiotropium           sucralfate 1 GM tablet    CARAFATE    40 tablet    Take 1 tablet (1 g) by mouth 4 times daily May dissolve in 10 mL water is necessary. (Start upon completion of carafate suspension)    Gastroesophageal reflux disease without esophagitis       * traZODone 100 MG tablet    DESYREL    90 tablet    Take 1.5 tablets (150 mg) by mouth At Bedtime    Anxiety state       * traZODone 50 MG tablet    DESYREL          vitamin D 2000 UNITS tablet     100 tablet    Take 2,000 Units by mouth daily.        zinc 50 MG Tabs      Take 1 tablet by mouth daily        * Notice:  This list has 19 medication(s) that are the same as other medications prescribed for you. Read the directions carefully, and ask your doctor or other care provider to review them with you.

## 2017-10-03 NOTE — PROGRESS NOTES
SUBJECTIVE:   Sonya Foote is a 67 year old female who presents to clinic today for the following health issues:    Patient requesting ointment to help with pressure sores on buttocks, states was last prescribed at hospital.    Other concerns:  1. Treated for UTI 9/22/17- states urinary odor remains on Macrobid  2. Forms to be signed for power wheel chair     Problem list and histories reviewed & adjusted, as indicated.  Additional history: as documented    Patient Active Problem List   Diagnosis     Hyperlipidemia LDL goal <100     Seizure disorder (H)     ACP (advance care planning)     Osteoporosis     Schizoaffective disorder (H)     AS (sickle cell trait) (H)     Vertigo     Person who has had sex change operation     Claudication in peripheral vascular disease (H)     Intestinal malabsorption     GIB (gastrointestinal bleeding)     Cervicalgia     Health Care Home     Asthma     Adjustment disorder with depressed mood     Chronic pain syndrome     Open-angle glaucoma     Hx of colonic polyp     Old myocardial infarction     Iron deficiency anemia     Late effect of stroke     Degeneration of intervertebral disc of lumbosacral region     Thoracic or lumbosacral neuritis or radiculitis     Cerebral infarction due to occlusion or stenosis of carotid artery     Disorder of bone and cartilage     Hereditary and idiopathic peripheral neuropathy     Androgen insensitivity syndrome     PAD (peripheral artery disease) (H)     Chronic systolic congestive heart failure (H)     Stenosis of carotid artery     Osteoarthritis     Pain in both upper extremities     Atherosclerotic peripheral vascular disease with rest pain (H)     Essential hypertension     Cellulitis of right ankle     Angina pectoris, crescendo (H)     Type 2 diabetes mellitus with diabetic peripheral angiopathy without gangrene, without long-term current use of insulin (H)     Anemia, unspecified type     Critical lower limb ischemia     Testicular  feminization     Anxiety disorder due to general medical condition     Central retinal artery occlusion     Lumbosacral radiculitis     Peripheral neuropathy (H)     Osteopenia     Prediabetes     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 (H)     Primary open angle glaucoma of both eyes, severe stage     Pseudophakia of right eye     Cataract, left eye     Diabetes mellitus type 2 without retinopathy (H)     Pyelonephritis     Chronic obstructive pulmonary disease, unspecified COPD type (H)     JOSE (obstructive sleep apnea)     Complex sleep apnea syndrome     Coronary artery disease of native artery of native heart with stable angina pectoris (H)     Hyperlipidemia     Ischemic cardiomyopathy     Bee sting reaction, undetermined intent, subsequent encounter     Functional incontinence     Personal history of urinary tract infection     Dysphagia     Single seizure (H)     Essential hypertension with goal blood pressure less than 130/80     Hyperlipidemia LDL goal <70     Past Surgical History:   Procedure Laterality Date     AMPUTATE LEG ABOVE KNEE Left 6/11/2016    Procedure: AMPUTATE LEG ABOVE KNEE;  Surgeon: Mello Rodriguez MD;  [facility]     AMPUTATE LEG BELOW KNEE Right 11/7/2016    Procedure: AMPUTATE LEG BELOW KNEE;  Surgeon: Savannah Durant MD;  [facility]     AMPUTATE REVISION STUMP LOWER EXTREMITY Right 11/11/2016    Procedure: AMPUTATE REVISION STUMP LOWER EXTREMITY;  Surgeon: Savannah Durant MD;  [facility]     AMPUTATE REVISION STUMP LOWER EXTREMITY Right 11/16/2016    Procedure: AMPUTATE REVISION STUMP LOWER EXTREMITY;  Surgeon: Savannah Durant MD;  [facility]     AMPUTATE TOE(S) Right 1/5/2016    Procedure: AMPUTATE TOE(S);  Surgeon: Mello Gaines DPM;  [facility]     ANGIOGRAM Bilateral 11/21/2014    Procedure: ANGIOGRAM;  Surgeon: Savannah Durant MD;  [facility]     ANGIOGRAM Left 1/16/2015    Procedure: ANGIOGRAM;  Surgeon: Savannah Durant MD;   [facility]     ANGIOGRAM Bilateral 9/14/2015    Procedure: ANGIOGRAM;  Surgeon: Savannah Durant MD;  [facility]     ANGIOGRAM Left 10/12/2015    Procedure: ANGIOGRAM;  Surgeon: Savannah Durant MD;  [facility]     ANGIOGRAM Right 6/6/2016    Procedure: ANGIOGRAM;  Surgeon: Savannah Durant MD;  [facility]     ANGIOPLASTY Right 6/6/2016    Procedure: ANGIOPLASTY;  Surgeon: Savannah Durant MD;  [facility]     APPENDECTOMY       BREAST SURGERY      right breast bx (benign)     CHOLECYSTECTOMY       COLONOSCOPY N/A 8/25/2014    Procedure: COLONOSCOPY;  Surgeon: Mello Ferrer MD;  [facility]     COLONOSCOPY WITH CO2 INSUFFLATION N/A 8/20/2014    Procedure: COLONOSCOPY WITH CO2 INSUFFLATION;  Surgeon: Duane, William Charles, MD;  [facility]     ENDARTERECTOMY FEMORAL  5/23/2014    Procedure: ENDARTERECTOMY FEMORAL;  Surgeon: Jason Joshi MD;  [facility]     ESOPHAGOSCOPY, GASTROSCOPY, DUODENOSCOPY (EGD), COMBINED  12/14/2012    Procedure: COMBINED ESOPHAGOSCOPY, GASTROSCOPY, DUODENOSCOPY (EGD), BIOPSY SINGLE OR MULTIPLE;  ESOPHAGOSCOPY, GASTROSCOPY, DUODENOSCOPY (EGD), DILATATION ;  Surgeon: Elizabeth Stevenson MD;  Location:  GI     ESOPHAGOSCOPY, GASTROSCOPY, DUODENOSCOPY (EGD), COMBINED  12/31/2013    Procedure: COMBINED ESOPHAGOSCOPY, GASTROSCOPY, DUODENOSCOPY (EGD), BIOPSY SINGLE OR MULTIPLE;;  Surgeon: Clemente Lopez MD;  [facility]     ESOPHAGOSCOPY, GASTROSCOPY, DUODENOSCOPY (EGD), COMBINED  4/1/2014    Procedure: COMBINED ESOPHAGOSCOPY, GASTROSCOPY, DUODENOSCOPY (EGD);;  Surgeon: Clemente Lopez MD;  [facility]     ESOPHAGOSCOPY, GASTROSCOPY, DUODENOSCOPY (EGD), COMBINED  6/28/2014    Procedure: COMBINED ESOPHAGOSCOPY, GASTROSCOPY, DUODENOSCOPY (EGD);  Surgeon: Clemente Lopez MD;  [facility]     ESOPHAGOSCOPY, GASTROSCOPY, DUODENOSCOPY (EGD), COMBINED N/A 8/20/2014    Procedure: COMBINED ESOPHAGOSCOPY, GASTROSCOPY, DUODENOSCOPY (EGD), BIOPSY SINGLE OR  MULTIPLE;  Surgeon: Duane, William Charles, MD;  [facility]     ESOPHAGOSCOPY, GASTROSCOPY, DUODENOSCOPY (EGD), COMBINED N/A 8/22/2014    Procedure: COMBINED ESOPHAGOSCOPY, GASTROSCOPY, DUODENOSCOPY (EGD), BIOPSY SINGLE OR MULTIPLE;  Surgeon: Mello Ferrer MD;  [facility]     ESOPHAGOSCOPY, GASTROSCOPY, DUODENOSCOPY (EGD), COMBINED N/A 10/2/2014    Procedure: COMBINED ESOPHAGOSCOPY, GASTROSCOPY, DUODENOSCOPY (EGD), BIOPSY SINGLE OR MULTIPLE;  Surgeon: Remy Haskins MD;  [facility]     ESOPHAGOSCOPY, GASTROSCOPY, DUODENOSCOPY (EGD), COMBINED Left 12/15/2014    Procedure: COMBINED ESOPHAGOSCOPY, GASTROSCOPY, DUODENOSCOPY (EGD), BIOPSY SINGLE OR MULTIPLE;  Surgeon: Remy Haskins MD;  [facility]     ESOPHAGOSCOPY, GASTROSCOPY, DUODENOSCOPY (EGD), COMBINED N/A 2/25/2015    Procedure: COMBINED ENDOSCOPIC ULTRASOUND, ESOPHAGOSCOPY, GASTROSCOPY, DUODENOSCOPY (EGD), FINE NEEDLE ASPIRATE/BIOPSY;  Surgeon: Clemente Lugo MD;  [facility]     ESOPHAGOSCOPY, GASTROSCOPY, DUODENOSCOPY (EGD), COMBINED Left 2/25/2015    Procedure: COMBINED ESOPHAGOSCOPY, GASTROSCOPY, DUODENOSCOPY (EGD), BIOPSY SINGLE OR MULTIPLE;  Surgeon: Clemente Lugo MD;  [facility]     ESOPHAGOSCOPY, GASTROSCOPY, DUODENOSCOPY (EGD), COMBINED N/A 9/25/2016    Procedure: COMBINED ESOPHAGOSCOPY, GASTROSCOPY, DUODENOSCOPY (EGD);  Surgeon: Aziza Patiño MD;  [facility]     ESOPHAGOSCOPY, GASTROSCOPY, DUODENOSCOPY (EGD), COMBINED N/A 1/18/2017    Procedure: COMBINED ESOPHAGOSCOPY, GASTROSCOPY, DUODENOSCOPY (EGD), BIOPSY SINGLE OR MULTIPLE;  Surgeon: Clemente Lopez MD;  [facility]     FASCIOTOMY LOWER EXTREMITY Left 6/10/2016    Procedure: FASCIOTOMY LOWER EXTREMITY;  Surgeon: Mello Rodriguez MD;  [facility]     HC CAPSULE ENDOSCOPY N/A 8/25/2014    Procedure: CAPSULE/PILL CAM ENDOSCOPY;  Surgeon: Remy Haskins MD;  [facility]     HC CAPSULE ENDOSCOPY N/A 10/2/2014     Procedure: CAPSULE/PILL CAM ENDOSCOPY;  Surgeon: Remy Haskins MD;  Location:  GI     ORTHOPEDIC SURGERY      broken wrist repair     SEX TRANSFORMATION SURGERY, MALE TO FEMALE      1974     SINUS SURGERY      cyst removed     TONSILLECTOMY       VASCULAR SURGERY      Left carotid stent       Social History   Substance Use Topics     Smoking status: Former Smoker     Packs/day: 2.50     Years: 30.00     Types: Cigarettes, Cigars     Quit date: 11/1/2000     Smokeless tobacco: Never Used     Alcohol use No     Family History   Problem Relation Age of Onset     Dementia Mother      Glaucoma Mother      DIABETES Mother      may have been type 1, childhood     Coronary Artery Disease Mother      MI     Glaucoma Father      DIABETES Father      may hev been type 1 - ??     Heart Failure Father      CANCER Maternal Aunt      leukemia     Schizophrenia Brother      Depression Brother      Suicide Sister      Depression Sister      DIABETES Sister      CANCER Maternal Aunt      ovarian     Glaucoma Maternal Grandmother      DIABETES Maternal Grandmother      Glaucoma Maternal Grandfather      DIABETES Maternal Grandfather      Glaucoma Paternal Grandmother      DIABETES Paternal Grandmother      Glaucoma Paternal Grandfather      DIABETES Paternal Grandfather      Breast Cancer Sister      CEREBROVASCULAR DISEASE Brother      Colon Cancer No family hx of          Current Outpatient Prescriptions   Medication Sig Dispense Refill     cyclobenzaprine (FLEXERIL) 10 MG tablet Take 1 tablet (10 mg) by mouth 2 times daily as needed for muscle spasms 30 tablet 1     traZODone (DESYREL) 50 MG tablet        SPIRIVA HANDIHALER 18 MCG capsule        LYRICA 100 MG capsule        lidocaine (LIDODERM) 5 % Patch APPLY 1 TO 3 PATCHES TO PAINFUL AREA AT ONCE FOR UP TO 12 HOURS WITHIN A 24 HOUR PERIOD REMOVE AFTER 12 HOURS 30 patch 5     blood glucose monitoring (ONE TOUCH ULTRASOFT) lancets Use to test blood sugar 3 times daily or  "as directed. 100 each PRN     blood glucose monitoring (ONE TOUCH ULTRA) test strip Use to test blood sugars 3 times daily or as directed. 3 Box 3     estradiol (ESTRACE VAGINAL) 0.1 MG/GM cream Use dime size amount 3x a week at night 42.5 g 3     metoprolol (TOPROL-XL) 25 MG 24 hr tablet TAKE 1 TABLET (25 MG) BY MOUTH DAILY 30 tablet 10     traZODone (DESYREL) 100 MG tablet Take 1.5 tablets (150 mg) by mouth At Bedtime 90 tablet 3     order for [facility] Full electric hospital bed with half rails    Dx: R46224, I110, J449  Length of need: lifetime 1 Device 0     order for [facility] Wheel Chair Cushion: 18 x 18 inch Roho cushion 1 Device 0     order for [facility] roho w/c cushion 18\" X 18\" for pressure relief 1 Device 0     order for [facility] Hospital bed with side rails 1 Device 0     polyethylene glycol (MIRALAX/GLYCOLAX) Packet Take 17 g by mouth daily Dissolved in water or juice       ESCITALOPRAM OXALATE PO Take 10 mg by mouth daily       ACETAMINOPHEN PO Take 1,000 mg by mouth every 6 hours as needed for fever Taking q4-6 hours, per MAR       pregabalin (LYRICA) 75 MG capsule Take 2 capsules (150 mg) by mouth 2 times daily 120 capsule 5     cetirizine (ZYRTEC) 10 MG tablet Take 1 tablet (10 mg) by mouth daily as needed for allergies 90 tablet 3     diclofenac (VOLTAREN) 1 % GEL topical gel Apply 2 g topically 4 times daily to hands 100 g 11     EPINEPHrine (EPIPEN JR) 0.15 MG/0.3ML injection Inject 0.3 mLs (0.15 mg) into the muscle as needed for anaphylaxis 0.6 mL 1     lisinopril (PRINIVIL/ZESTRIL) 10 MG tablet Take 1 tablet (10 mg) by mouth daily 90 tablet 3     pantoprazole (PROTONIX) 40 MG EC tablet Take 40 mg by mouth daily       risperiDONE (RISPERDAL) 0.5 MG tablet Take 0.5 mg by mouth At Bedtime       ferrous sulfate (IRON) 325 (65 FE) MG tablet Take 1 tablet (325 mg) by mouth 2 times daily With meals 60 tablet 2     sucralfate (CARAFATE) 1 GM tablet Take 1 tablet (1 g) by mouth 4 times daily May dissolve in 10 mL water is " necessary. (Start upon completion of carafate suspension) 40 tablet 5     albuterol (2.5 MG/3ML) 0.083% neb solution INHALE 1 VIAL VIA NEBULIZER EVERY 6 HOURS AS NEEDED 360 mL 11     CYANOCOBALAMIN PO Take 2,000 mcg by mouth daily       Nutritional Supplements (RENÉE) PACK Take 1 packet by mouth 2 times daily       fluticasone (FLONASE) 50 MCG/ACT nasal spray Spray 1 spray into both nostrils daily       ADVAIR DISKUS 250-50 MCG/DOSE diskus inhaler Inhale 1 puff into the lungs 2 times daily        calcium citrate-vitamin D (CITRACAL) 315-250 MG-UNIT TABS Take 2 tablets by mouth daily 120 tablet 5     hydrochlorothiazide (MICROZIDE) 12.5 MG capsule Take 1 capsule (12.5 mg) by mouth daily (Patient taking differently: Take 12.5 mg by mouth daily Hold for sbp<90) 90 capsule 3     estradiol (ESTRACE) 1 MG tablet Take 1 tablet (1 mg) by mouth daily 90 tablet 3     levETIRAcetam (KEPPRA) 500 MG tablet Take 1 tablet (500 mg) by mouth 2 times daily 180 tablet 1     aspirin EC 81 MG EC tablet Take 1 tablet (81 mg) by mouth daily (Patient taking differently: Take 81 mg by mouth every morning ) 90 tablet 3     clopidogrel (PLAVIX) 75 MG tablet Take 1 tablet (75 mg) by mouth daily 90 tablet 3     blood glucose monitoring (ONE TOUCH ULTRA 2) meter device kit Use to test blood sugars 3 times daily or as directed. 1 kit 0     phenytoin 200 MG CAPS Take 200 mg by mouth 2 times daily (Patient taking differently: Take 200 mg by mouth 2 times daily (takes at 8 AM and 8 PM)) 60 capsule 0     dorzolamide (TRUSOPT) 2 % ophthalmic solution Place 1 drop into both eyes 2 times daily        zinc 50 MG TABS Take 1 tablet by mouth daily       nitroglycerin (NITROSTAT) 0.4 MG SL tablet Place 1 tablet (0.4 mg) under the tongue every 5 minutes as needed for chest pain if you are still having symptoms after 3 doses (15 minutes) call 911. 25 tablet 1     latanoprost (XALATAN) 0.005 % ophthalmic solution Place 1 drop into both eyes At Bedtime 2.5 mL  11     atorvastatin (LIPITOR) 40 MG tablet Take 1 tablet (40 mg) by mouth daily (Patient taking differently: Take 40 mg by mouth At Bedtime ) 90 tablet 3     order for DME Equipment being ordered: Glucerna daily shakes with each meal 1 Box 11     prochlorperazine (COMPAZINE) 10 MG tablet Take 1 tablet (10 mg) by mouth every 8 hours as needed 20 tablet 0     Cholecalciferol (VITAMIN D) 2000 UNITS tablet Take 2,000 Units by mouth daily. 100 tablet 3     Multiple Vitamin (MULTIVITAMIN OR) Take 1 tablet by mouth daily        order for DME Equipment being ordered: wheelchair seat cushion 1 Device 0     order for DME Equipment being ordered: CPAP supplies mask, hose, filters, cushion    fax to [facility] at [phone] 10 Device prn     order for DME Equipment being ordered: CPAP supplies mask, hose, filters, cushion fax to [facility] at [phone]  Disp: 10 Device Refills: prn   Class: Local Print Start: 2/10/2017 1 Device 0     order for DME Equipment being ordered: Nebulizer and tubing supplies 1 Units 0     order for DME ACcu check BID 1 Device 0     ondansetron (ZOFRAN-ODT) 4 MG ODT tab Take 1 tablet (4 mg) by mouth every 6 hours (Patient taking differently: Take 4 mg by mouth every 6 hours as needed ) 120 tablet 0     MEDICATION GIVEN BY INTRATHECAL PUMP - INSTRUCTION continuous May 19, 2017 - per [facility] in [place] [phone]:  Conc: Bupivacaine 20 mg/mL and Fentanyl 2000 mcg/mL.  Continuous: Bupivacaine 7.265 mg/day and Fentanyl 726.5 mcg/day.  Boluses: Up to 7 boluses per 24-hr period of Bupivicaine 0.599 mg and Fentanyl 59.9 mcg    Pump Refill Date at [facility]: 7/2/17       ORDER FOR DME Equipment being ordered: Nebulizer and supplies 1 Units 0     ORDER FOR DME Equipment being ordered: Power Wheelchair 1 Device 0     ORDER FOR DME Equipment being ordered: Depends briefs 1 Month 11     EASY TOUCH LANCETS 30G/TWIST MISC        Allergies   Allergen Reactions      Bee Venom      Penicillins Anaphylaxis     Other reaction(s): Skin Rash and/or Hives     Dilantin [Phenytoin] Other (See Comments)     Generic dilantin only per pt     Iodide Hives     09/11/15: Pt states she can use iodine and doesn't have any problems      Iodine-131      Novocaine [Procaine] Hives     Other reaction(s): Skin Rash and/or Hives     BP Readings from Last 3 Encounters:   10/02/17 102/50   09/27/17 123/72   09/08/17 113/68    Wt Readings from Last 3 Encounters:   09/08/17 148 lb (67.1 kg)   09/06/17 133 lb (60.3 kg)   08/28/17 133 lb (60.3 kg)            Reviewed and updated as needed this visit by clinical staff     Reviewed and updated as needed this visit by Provider         ROS:  C: NEGATIVE for fever, chills, change in weight  E/M: NEGATIVE for ear, mouth and throat problems  R: NEGATIVE for significant cough or SOB  CV: NEGATIVE for chest pain, palpitations or peripheral edema  GI: NEGATIVE for nausea, abdominal pain, heartburn, or change in bowel habits  M: NEGATIVE for significant arthralgias or myalgia  H: NEGATIVE for bleeding problems  P: NEGATIVE for changes in mood or affect    OBJECTIVE:                                                    /84  Pulse 80  Temp 98.7  F (37.1  C) (Oral)  Wt 148 lb (67.1 kg)  SpO2 98%  BMI 23.18 kg/m2  Body mass index is 23.18 kg/(m^2).  GENERAL: in wheelchair  RESP: lungs clear to auscultation - no rales, no rhonchi, no wheezes  CV: regular rates and rhythm, normal S1 S2, no S3 or S4 and no click or rub   PSYCH: Alert and oriented times 3; speech- coherent , normal rate and volume; able to articulate logical thoughts, able to abstract reason, no tangential thoughts, no hallucinations or delusions, affect- patria     ASSESSMENT/PLAN:                                                      (M54.2) Cervicalgia  (primary encounter diagnosis)  Comment: refilled per request  Plan: cyclobenzaprine (FLEXERIL) 10 MG tablet    Also signed forms for patients  wheelchair.          (R35.0) Urinary frequency  Comment: complete abx as ordered  Plan: *UA reflex to Microscopic and Culture ([place]         and [facility] (except [place] and         [place]), UA revealed sensitivity to abx prescribed             See Patient Instructions    Allan Casey MD  [facility]    THE MEDICATION LIST HAS BEEN FULLY RECONCILED BY THE [facility] AND THE NURSING STAFF.

## 2017-10-08 ENCOUNTER — APPOINTMENT (OUTPATIENT)
Dept: CT IMAGING | Facility: CLINIC | Age: 67
DRG: 872 | End: 2017-10-08
Attending: EMERGENCY MEDICINE
Payer: COMMERCIAL

## 2017-10-08 ENCOUNTER — HOSPITAL ENCOUNTER (INPATIENT)
Facility: CLINIC | Age: 67
LOS: 3 days | Discharge: HOME HEALTH | DRG: 872 | End: 2017-10-11
Attending: EMERGENCY MEDICINE | Admitting: INTERNAL MEDICINE
Payer: COMMERCIAL

## 2017-10-08 DIAGNOSIS — R10.9 ABDOMINAL PAIN: ICD-10-CM

## 2017-10-08 DIAGNOSIS — F25.9 SCHIZOAFFECTIVE DISORDER, UNSPECIFIED TYPE (H): Primary | ICD-10-CM

## 2017-10-08 DIAGNOSIS — Z87.890: ICD-10-CM

## 2017-10-08 DIAGNOSIS — I73.9 PAD (PERIPHERAL ARTERY DISEASE) (H): ICD-10-CM

## 2017-10-08 DIAGNOSIS — I50.22 CHRONIC SYSTOLIC CONGESTIVE HEART FAILURE (H): ICD-10-CM

## 2017-10-08 DIAGNOSIS — N10 PYELONEPHRITIS, ACUTE: ICD-10-CM

## 2017-10-08 DIAGNOSIS — F43.21 ADJUSTMENT DISORDER WITH DEPRESSED MOOD: ICD-10-CM

## 2017-10-08 DIAGNOSIS — G40.909 SEIZURE DISORDER (H): ICD-10-CM

## 2017-10-08 DIAGNOSIS — I73.9 CLAUDICATION IN PERIPHERAL VASCULAR DISEASE (H): ICD-10-CM

## 2017-10-08 DIAGNOSIS — G89.4 CHRONIC PAIN SYNDROME: ICD-10-CM

## 2017-10-08 PROBLEM — N39.0 UTI (URINARY TRACT INFECTION): Status: ACTIVE | Noted: 2017-10-08

## 2017-10-08 LAB
ALBUMIN SERPL-MCNC: 2.9 G/DL (ref 3.4–5)
ALBUMIN UR-MCNC: 10 MG/DL
ALP SERPL-CCNC: 225 U/L (ref 40–150)
ALT SERPL W P-5'-P-CCNC: 45 U/L (ref 0–50)
ANION GAP SERPL CALCULATED.3IONS-SCNC: 8 MMOL/L (ref 3–14)
APPEARANCE UR: CLEAR
AST SERPL W P-5'-P-CCNC: 48 U/L (ref 0–45)
BACTERIA #/AREA URNS HPF: ABNORMAL /HPF
BASOPHILS # BLD AUTO: 0 10E9/L (ref 0–0.2)
BASOPHILS NFR BLD AUTO: 0.2 %
BILIRUB SERPL-MCNC: 0.3 MG/DL (ref 0.2–1.3)
BILIRUB UR QL STRIP: NEGATIVE
BUN SERPL-MCNC: 12 MG/DL (ref 7–30)
CALCIUM SERPL-MCNC: 9 MG/DL (ref 8.5–10.1)
CHLORIDE SERPL-SCNC: 103 MMOL/L (ref 94–109)
CO2 SERPL-SCNC: 28 MMOL/L (ref 20–32)
COLOR UR AUTO: ABNORMAL
CREAT SERPL-MCNC: 0.63 MG/DL (ref 0.52–1.04)
DIFFERENTIAL METHOD BLD: ABNORMAL
EOSINOPHIL # BLD AUTO: 0.1 10E9/L (ref 0–0.7)
EOSINOPHIL NFR BLD AUTO: 0.7 %
ERYTHROCYTE [DISTWIDTH] IN BLOOD BY AUTOMATED COUNT: 14.6 % (ref 10–15)
GFR SERPL CREATININE-BSD FRML MDRD: >90 ML/MIN/1.7M2
GLUCOSE SERPL-MCNC: 85 MG/DL (ref 70–99)
GLUCOSE UR STRIP-MCNC: NEGATIVE MG/DL
HCT VFR BLD AUTO: 37.6 % (ref 35–47)
HGB BLD-MCNC: 12.3 G/DL (ref 11.7–15.7)
HGB UR QL STRIP: ABNORMAL
IMM GRANULOCYTES # BLD: 0.1 10E9/L (ref 0–0.4)
IMM GRANULOCYTES NFR BLD: 0.3 %
KETONES UR STRIP-MCNC: NEGATIVE MG/DL
LACTATE BLD-SCNC: 0.8 MMOL/L (ref 0.7–2)
LEUKOCYTE ESTERASE UR QL STRIP: ABNORMAL
LIPASE SERPL-CCNC: 156 U/L (ref 73–393)
LYMPHOCYTES # BLD AUTO: 2.3 10E9/L (ref 0.8–5.3)
LYMPHOCYTES NFR BLD AUTO: 13.6 %
MCH RBC QN AUTO: 29 PG (ref 26.5–33)
MCHC RBC AUTO-ENTMCNC: 32.7 G/DL (ref 31.5–36.5)
MCV RBC AUTO: 89 FL (ref 78–100)
MONOCYTES # BLD AUTO: 1.6 10E9/L (ref 0–1.3)
MONOCYTES NFR BLD AUTO: 9.4 %
MUCOUS THREADS #/AREA URNS LPF: PRESENT /LPF
NEUTROPHILS # BLD AUTO: 12.8 10E9/L (ref 1.6–8.3)
NEUTROPHILS NFR BLD AUTO: 75.8 %
NITRATE UR QL: POSITIVE
NRBC # BLD AUTO: 0 10*3/UL
NRBC BLD AUTO-RTO: 0 /100
PH UR STRIP: 7 PH (ref 5–7)
PLATELET # BLD AUTO: 268 10E9/L (ref 150–450)
PLATELET # BLD EST: ABNORMAL 10*3/UL
POTASSIUM SERPL-SCNC: 3.8 MMOL/L (ref 3.4–5.3)
PROT SERPL-MCNC: 8.9 G/DL (ref 6.8–8.8)
RBC # BLD AUTO: 4.24 10E12/L (ref 3.8–5.2)
RBC #/AREA URNS AUTO: 7 /HPF (ref 0–2)
SODIUM SERPL-SCNC: 138 MMOL/L (ref 133–144)
SOURCE: ABNORMAL
SP GR UR STRIP: 1.01 (ref 1–1.03)
SQUAMOUS #/AREA URNS AUTO: <1 /HPF (ref 0–1)
TROPONIN I SERPL-MCNC: <0.015 UG/L (ref 0–0.04)
UROBILINOGEN UR STRIP-MCNC: NORMAL MG/DL (ref 0–2)
WBC # BLD AUTO: 16.9 10E9/L (ref 4–11)
WBC #/AREA URNS AUTO: 20 /HPF (ref 0–2)

## 2017-10-08 PROCEDURE — 96375 TX/PRO/DX INJ NEW DRUG ADDON: CPT | Performed by: EMERGENCY MEDICINE

## 2017-10-08 PROCEDURE — 99285 EMERGENCY DEPT VISIT HI MDM: CPT | Mod: 25 | Performed by: EMERGENCY MEDICINE

## 2017-10-08 PROCEDURE — 74176 CT ABD & PELVIS W/O CONTRAST: CPT

## 2017-10-08 PROCEDURE — 93005 ELECTROCARDIOGRAM TRACING: CPT | Performed by: EMERGENCY MEDICINE

## 2017-10-08 PROCEDURE — 12000001 ZZH R&B MED SURG/OB UMMC

## 2017-10-08 PROCEDURE — 83735 ASSAY OF MAGNESIUM: CPT | Performed by: EMERGENCY MEDICINE

## 2017-10-08 PROCEDURE — 84100 ASSAY OF PHOSPHORUS: CPT | Performed by: EMERGENCY MEDICINE

## 2017-10-08 PROCEDURE — 87086 URINE CULTURE/COLONY COUNT: CPT | Performed by: EMERGENCY MEDICINE

## 2017-10-08 PROCEDURE — 96365 THER/PROPH/DIAG IV INF INIT: CPT | Performed by: EMERGENCY MEDICINE

## 2017-10-08 PROCEDURE — 87040 BLOOD CULTURE FOR BACTERIA: CPT | Performed by: PEDIATRICS

## 2017-10-08 PROCEDURE — 80053 COMPREHEN METABOLIC PANEL: CPT | Performed by: EMERGENCY MEDICINE

## 2017-10-08 PROCEDURE — 83690 ASSAY OF LIPASE: CPT | Performed by: EMERGENCY MEDICINE

## 2017-10-08 PROCEDURE — 81001 URINALYSIS AUTO W/SCOPE: CPT | Performed by: EMERGENCY MEDICINE

## 2017-10-08 PROCEDURE — 99223 1ST HOSP IP/OBS HIGH 75: CPT | Mod: AI | Performed by: INTERNAL MEDICINE

## 2017-10-08 PROCEDURE — 96366 THER/PROPH/DIAG IV INF ADDON: CPT | Performed by: EMERGENCY MEDICINE

## 2017-10-08 PROCEDURE — 96361 HYDRATE IV INFUSION ADD-ON: CPT | Performed by: EMERGENCY MEDICINE

## 2017-10-08 PROCEDURE — 84484 ASSAY OF TROPONIN QUANT: CPT | Performed by: EMERGENCY MEDICINE

## 2017-10-08 PROCEDURE — 87088 URINE BACTERIA CULTURE: CPT | Performed by: EMERGENCY MEDICINE

## 2017-10-08 PROCEDURE — 25000128 H RX IP 250 OP 636: Performed by: PEDIATRICS

## 2017-10-08 PROCEDURE — 25000128 H RX IP 250 OP 636: Performed by: EMERGENCY MEDICINE

## 2017-10-08 PROCEDURE — 85025 COMPLETE CBC W/AUTO DIFF WBC: CPT | Performed by: EMERGENCY MEDICINE

## 2017-10-08 PROCEDURE — 87186 SC STD MICRODIL/AGAR DIL: CPT | Performed by: EMERGENCY MEDICINE

## 2017-10-08 PROCEDURE — 83605 ASSAY OF LACTIC ACID: CPT | Performed by: EMERGENCY MEDICINE

## 2017-10-08 PROCEDURE — 36415 COLL VENOUS BLD VENIPUNCTURE: CPT | Performed by: PEDIATRICS

## 2017-10-08 PROCEDURE — 93010 ELECTROCARDIOGRAM REPORT: CPT | Mod: Z6 | Performed by: EMERGENCY MEDICINE

## 2017-10-08 RX ORDER — FLUTICASONE PROPIONATE 50 MCG
1 SPRAY, SUSPENSION (ML) NASAL DAILY
Status: DISCONTINUED | OUTPATIENT
Start: 2017-10-09 | End: 2017-10-11 | Stop reason: HOSPADM

## 2017-10-08 RX ORDER — HYDROMORPHONE HYDROCHLORIDE 1 MG/ML
0.5 INJECTION, SOLUTION INTRAMUSCULAR; INTRAVENOUS; SUBCUTANEOUS ONCE
Status: DISCONTINUED | OUTPATIENT
Start: 2017-10-08 | End: 2017-10-08

## 2017-10-08 RX ORDER — FENTANYL CITRATE 50 UG/ML
50 INJECTION, SOLUTION INTRAMUSCULAR; INTRAVENOUS ONCE
Status: COMPLETED | OUTPATIENT
Start: 2017-10-08 | End: 2017-10-08

## 2017-10-08 RX ORDER — CLOPIDOGREL BISULFATE 75 MG/1
75 TABLET ORAL DAILY
Status: DISCONTINUED | OUTPATIENT
Start: 2017-10-09 | End: 2017-10-08

## 2017-10-08 RX ORDER — ATORVASTATIN CALCIUM 40 MG/1
40 TABLET, FILM COATED ORAL AT BEDTIME
Status: DISCONTINUED | OUTPATIENT
Start: 2017-10-09 | End: 2017-10-11 | Stop reason: HOSPADM

## 2017-10-08 RX ORDER — LATANOPROST 50 UG/ML
1 SOLUTION/ DROPS OPHTHALMIC AT BEDTIME
Status: DISCONTINUED | OUTPATIENT
Start: 2017-10-08 | End: 2017-10-11 | Stop reason: HOSPADM

## 2017-10-08 RX ORDER — POLYETHYLENE GLYCOL 3350 17 G/17G
17 POWDER, FOR SOLUTION ORAL DAILY
Status: DISCONTINUED | OUTPATIENT
Start: 2017-10-09 | End: 2017-10-11 | Stop reason: HOSPADM

## 2017-10-08 RX ORDER — GABAPENTIN 300 MG/1
300 CAPSULE ORAL 3 TIMES DAILY
Status: DISCONTINUED | OUTPATIENT
Start: 2017-10-08 | End: 2017-10-09

## 2017-10-08 RX ORDER — ALBUTEROL SULFATE 0.83 MG/ML
2.5 SOLUTION RESPIRATORY (INHALATION) EVERY 4 HOURS PRN
Status: DISCONTINUED | OUTPATIENT
Start: 2017-10-08 | End: 2017-10-11 | Stop reason: HOSPADM

## 2017-10-08 RX ORDER — ONDANSETRON 2 MG/ML
4 INJECTION INTRAMUSCULAR; INTRAVENOUS ONCE
Status: DISCONTINUED | OUTPATIENT
Start: 2017-10-08 | End: 2017-10-08

## 2017-10-08 RX ORDER — SUCRALFATE 1 G/1
1 TABLET ORAL
Status: DISCONTINUED | OUTPATIENT
Start: 2017-10-09 | End: 2017-10-11 | Stop reason: HOSPADM

## 2017-10-08 RX ORDER — ESCITALOPRAM OXALATE 20 MG/1
20 TABLET ORAL DAILY
Status: DISCONTINUED | OUTPATIENT
Start: 2017-10-09 | End: 2017-10-11 | Stop reason: HOSPADM

## 2017-10-08 RX ORDER — ROPINIROLE 0.25 MG/1
0.75 TABLET, FILM COATED ORAL AT BEDTIME
Status: DISCONTINUED | OUTPATIENT
Start: 2017-10-08 | End: 2017-10-09

## 2017-10-08 RX ORDER — PROCHLORPERAZINE MALEATE 5 MG
10 TABLET ORAL EVERY 8 HOURS PRN
Status: DISCONTINUED | OUTPATIENT
Start: 2017-10-08 | End: 2017-10-11 | Stop reason: HOSPADM

## 2017-10-08 RX ORDER — ESTRADIOL 1 MG/1
1 TABLET ORAL DAILY
Status: DISCONTINUED | OUTPATIENT
Start: 2017-10-09 | End: 2017-10-11 | Stop reason: HOSPADM

## 2017-10-08 RX ORDER — NALOXONE HYDROCHLORIDE 0.4 MG/ML
.1-.4 INJECTION, SOLUTION INTRAMUSCULAR; INTRAVENOUS; SUBCUTANEOUS
Status: DISCONTINUED | OUTPATIENT
Start: 2017-10-08 | End: 2017-10-11 | Stop reason: HOSPADM

## 2017-10-08 RX ORDER — DOCUSATE SODIUM 100 MG/1
100 CAPSULE, LIQUID FILLED ORAL 2 TIMES DAILY
Status: DISCONTINUED | OUTPATIENT
Start: 2017-10-08 | End: 2017-10-11 | Stop reason: HOSPADM

## 2017-10-08 RX ORDER — ONDANSETRON 2 MG/ML
4 INJECTION INTRAMUSCULAR; INTRAVENOUS ONCE
Status: COMPLETED | OUTPATIENT
Start: 2017-10-08 | End: 2017-10-08

## 2017-10-08 RX ORDER — LEVETIRACETAM 500 MG/1
500 TABLET ORAL 2 TIMES DAILY
Status: DISCONTINUED | OUTPATIENT
Start: 2017-10-08 | End: 2017-10-11 | Stop reason: HOSPADM

## 2017-10-08 RX ORDER — RISPERIDONE 0.5 MG/1
0.5 TABLET ORAL AT BEDTIME
Status: DISCONTINUED | OUTPATIENT
Start: 2017-10-08 | End: 2017-10-11 | Stop reason: HOSPADM

## 2017-10-08 RX ORDER — ONDANSETRON 4 MG/1
4 TABLET, ORALLY DISINTEGRATING ORAL EVERY 6 HOURS PRN
Status: DISCONTINUED | OUTPATIENT
Start: 2017-10-08 | End: 2017-10-11 | Stop reason: HOSPADM

## 2017-10-08 RX ORDER — PANTOPRAZOLE SODIUM 40 MG/1
40 TABLET, DELAYED RELEASE ORAL
Status: DISCONTINUED | OUTPATIENT
Start: 2017-10-09 | End: 2017-10-11 | Stop reason: HOSPADM

## 2017-10-08 RX ORDER — ASPIRIN 81 MG/1
81 TABLET ORAL DAILY
Status: DISCONTINUED | OUTPATIENT
Start: 2017-10-09 | End: 2017-10-11 | Stop reason: HOSPADM

## 2017-10-08 RX ORDER — LEVOFLOXACIN 5 MG/ML
750 INJECTION, SOLUTION INTRAVENOUS ONCE
Status: COMPLETED | OUTPATIENT
Start: 2017-10-08 | End: 2017-10-08

## 2017-10-08 RX ORDER — PHENYTOIN SODIUM 100 MG/1
200 CAPSULE, EXTENDED RELEASE ORAL 2 TIMES DAILY
Status: DISCONTINUED | OUTPATIENT
Start: 2017-10-08 | End: 2017-10-11 | Stop reason: HOSPADM

## 2017-10-08 RX ORDER — ACETAMINOPHEN 325 MG/1
650 TABLET ORAL EVERY 4 HOURS PRN
Status: DISCONTINUED | OUTPATIENT
Start: 2017-10-08 | End: 2017-10-11 | Stop reason: HOSPADM

## 2017-10-08 RX ORDER — ONDANSETRON 2 MG/ML
4 INJECTION INTRAMUSCULAR; INTRAVENOUS EVERY 6 HOURS PRN
Status: DISCONTINUED | OUTPATIENT
Start: 2017-10-08 | End: 2017-10-11 | Stop reason: HOSPADM

## 2017-10-08 RX ORDER — TRAZODONE HYDROCHLORIDE 100 MG/1
100 TABLET ORAL AT BEDTIME
Status: DISCONTINUED | OUTPATIENT
Start: 2017-10-08 | End: 2017-10-11 | Stop reason: HOSPADM

## 2017-10-08 RX ORDER — ZINC SULFATE 50(220)MG
220 CAPSULE ORAL DAILY
Status: DISCONTINUED | OUTPATIENT
Start: 2017-10-09 | End: 2017-10-11 | Stop reason: HOSPADM

## 2017-10-08 RX ORDER — CETIRIZINE HYDROCHLORIDE 10 MG/1
10 TABLET ORAL DAILY PRN
Status: DISCONTINUED | OUTPATIENT
Start: 2017-10-08 | End: 2017-10-11 | Stop reason: HOSPADM

## 2017-10-08 RX ORDER — FENTANYL CITRATE 50 UG/ML
50 INJECTION, SOLUTION INTRAMUSCULAR; INTRAVENOUS
Status: DISCONTINUED | OUTPATIENT
Start: 2017-10-08 | End: 2017-10-09

## 2017-10-08 RX ORDER — LEVOFLOXACIN 5 MG/ML
750 INJECTION, SOLUTION INTRAVENOUS EVERY 24 HOURS
Status: DISCONTINUED | OUTPATIENT
Start: 2017-10-09 | End: 2017-10-11 | Stop reason: HOSPADM

## 2017-10-08 RX ORDER — DORZOLAMIDE HCL 20 MG/ML
1 SOLUTION/ DROPS OPHTHALMIC 2 TIMES DAILY
Status: DISCONTINUED | OUTPATIENT
Start: 2017-10-08 | End: 2017-10-11 | Stop reason: HOSPADM

## 2017-10-08 RX ADMIN — ONDANSETRON 4 MG: 2 INJECTION INTRAMUSCULAR; INTRAVENOUS at 22:50

## 2017-10-08 RX ADMIN — SODIUM CHLORIDE 1000 ML: 9 INJECTION, SOLUTION INTRAVENOUS at 22:56

## 2017-10-08 RX ADMIN — ENOXAPARIN SODIUM 40 MG: 40 INJECTION SUBCUTANEOUS at 22:49

## 2017-10-08 RX ADMIN — SODIUM CHLORIDE 1000 ML: 9 INJECTION, SOLUTION INTRAVENOUS at 17:53

## 2017-10-08 RX ADMIN — FENTANYL CITRATE 50 MCG: 50 INJECTION, SOLUTION INTRAMUSCULAR; INTRAVENOUS at 18:58

## 2017-10-08 RX ADMIN — ONDANSETRON 4 MG: 2 INJECTION INTRAMUSCULAR; INTRAVENOUS at 17:53

## 2017-10-08 RX ADMIN — SODIUM CHLORIDE 1000 ML: 9 INJECTION, SOLUTION INTRAVENOUS at 20:34

## 2017-10-08 RX ADMIN — LEVOFLOXACIN 750 MG: 5 INJECTION, SOLUTION INTRAVENOUS at 18:59

## 2017-10-08 ASSESSMENT — ENCOUNTER SYMPTOMS
VOMITING: 1
ABDOMINAL PAIN: 1
FEVER: 1
PALPITATIONS: 0
SHORTNESS OF BREATH: 0
DIARRHEA: 1
CHILLS: 0
COUGH: 0
LIGHT-HEADEDNESS: 1
HEADACHES: 1

## 2017-10-08 NOTE — IP AVS SNAPSHOT
MRN:[id number]                      After Visit Summary   10/8/2017    Sonya Foote    MRN: [id number]           Thank you!     Thank you for choosing [facility] for your care. Our goal is always to provide you with excellent care. Hearing back from our patients is one way we can continue to improve our services. Please take a few minutes to complete the written survey that you may receive in the mail after you visit with us. Thank you!        Patient Information     Date Of Birth          1950        Designated Caregiver       Most Recent Value    Caregiver    Will someone help with your care after discharge? no    Name of designated caregiver na      About your hospital stay     You were admitted on:  October 8, 2017 You last received care in the:  [address] [facility]    You were discharged on:  October 11, 2017        Reason for your hospital stay       You were admitted for a urinary tract infection. You improved with IV antibiotics and completed your course. You had difficulty eating and drinking due to nausea and vomiting during your stay. These improved by the time you were ready to discharge. Make sure to follow up with Urology for placement of a suprapubic catheter to reduce the risk of these recurrent infections.                  Who to Call     For medical emergencies, please call 911.  For non-urgent questions about your medical care, please call your primary care provider or clinic, [phone]          Attending Provider     Provider Specialty    Nicko Piña MD Emergency Medicine    Matt Ruth MD Internal Medicine    Michelet Jenkins MD Internal Medicine       Primary Care Provider Office Phone # Fax #    Allan Casey [phone]      After Care Instructions     Activity - Up with nursing assistance           Advance Diet as Tolerated       Follow this diet upon discharge: Orders Placed This Encounter      Room Service      Snacks/Supplements Adult:  Glucerna (Adult); With Meals      Mechanical/Dental Soft Diet            General info for SNF       Length of Stay Estimate: Long Term Care  Condition at Discharge: Stable  Level of care:skilled   Rehabilitation Potential: Fair  Admission H&P remains valid and up-to-date: Yes  Recent Chemotherapy: N/A  Use Nursing Home Standing Orders: Yes            Mantoux instructions       Give two-step Mantoux (PPD) Per Facility Policy Yes                  Follow-up Appointments     Follow Up ([facility])       Follow up with primary care provider, Allan Casey, within 14 days for hospital follow- up.  No follow up labs or test are needed.    Follow up with urology as scheduled    Appointments on [place] and/or [facility] (with [facility] or [facility] provider or service). Call [phone] if you haven't heard regarding these appointments within 7 days of discharge.                  Your next 10 appointments already scheduled     Oct 18, 2017 12:00 PM CDT   Return Visit with Elizabeth Oliver MD   [facility] ([facility])    [address]  [address]  [place] [address]   [phone]            Oct 23, 2017 11:20 AM CDT   Office Visit with Allan Casey MD   [facility] ([facility])    [address] [address] [phone]           Bring a current list of meds and any records pertaining to this visit. For Physicals, please bring immunization records and any forms needing to be filled out. Please arrive 10 minutes early to complete paperwork.            Nov 02, 2017  9:15 AM CDT   RETURN GLAUCOMA with Marely Robin MD   [facility] ([facility])    [address]  [address]  [facility] [facility] [address] [phone]            Nov 10, 2017  9:20 AM CST   (Arrive by 9:05 AM)   RETURN DIABETES with Michelle Irizarry MD   [facility] (  [facility])    [address]  [address] [facility] [address]   [phone]            Dec 29, 2017 10:00 AM CST   (Arrive by 9:45 AM)   New Patient Visit with VICTOR M Floyd CNP   [facility] ([facility])    [address]  [address] [facility] [address] [phone]            Feb 19, 2018  9:30 AM CST   (Arrive by 9:15 AM)   Return Visit with Alina Jennings MD   [facility] ([facility])    [address]  [address] [facility] [address] [phone]              Additional Services     Home Care OT Referral for Hospital Discharge       [facility]    OT to eval and treat    Your provider has ordered home care - occupational therapy. If you have not been contacted within 2 days of your discharge please call the department phone number listed on the top of this document.            Home care nursing referral       [facility]    RN skilled nursing visit. RN to assess respiratory and cardiac status, pain level and activity tolerance and hydration, nutrition and bowel status.    Your provider has ordered home care nursing services. If you have not been contacted within 2 days of your discharge please call the inpatient department phone number at [phone] .                  Further instructions from your care team       _______________________  [facility]  Phone  [phone]  Fax  [phone]  ______________________    [facility]  ph: [phone]  fax: [phone]     Pending Results     Date and Time Order Name Status Description    10/8/2017 2215 Blood culture Preliminary     10/8/2017 2215 Blood culture Preliminary             Statement of Approval     Ordered          10/11/17 0936  I have reviewed and agree with all the recommendations and orders detailed in this document.  EFFECTIVE  "NOW     Approved and electronically signed by:  Michelet Jenkins MD             Admission Information     Date & Time Provider Department Dept. Phone    10/8/2017 Michelet Jenkins MD [address] [facility] [phone]      Your Vitals Were     Blood Pressure Pulse Temperature Respirations Weight Pulse Oximetry    155/79 (BP Location: Left arm) 83 96.8  F (36  C) (Oral) 16 72.7 kg (160 lb 3.2 oz) 97%    BMI (Body Mass Index)                   25.09 kg/m2           [employer] Information     [employer] lets you send messages to your doctor, view your test results, renew your prescriptions, schedule appointments and more. To sign up, go to [email or web address] . Click on \"Log in\" on the left side of the screen, which will take you to the Welcome page. Then click on \"Sign up Now\" on the right side of the page.     You will be asked to enter the access code listed below, as well as some personal information. Please follow the directions to create your username and password.     Your access code is: [email or web address]  Expires: 10/17/2017  1:21 PM     Your access code will  in 90 days. If you need help or a new code, please call your [place] clinic or [phone].        Care EveryWhere ID     This is your Care EveryWhere ID. This could be used by other organizations to access your [place] medical records  [id number]        Equal Access to Services     [facility] : Hadii aad ku hadasho Sostephali, waaxda luqadaha, qaybta kaalmada adeegyada, tonie marvin . So [facility] [phone].    ATENCIÓN: Si habla español, tiene a briones disposición servicios gratuitos de asistencia lingüística. Llame al [phone].    We comply with applicable federal civil rights laws and [place] laws. We do not discriminate on the basis of race, color, national origin, age, disability, sex, sexual orientation, or gender identity.               Review of your medicines      START taking        Dose / Directions    levofloxacin " 750 MG tablet   Commonly known as:  LEVAQUIN   Used for:  Pyelonephritis, acute        Dose:  750 mg   Take 1 tablet (750 mg) by mouth daily   Quantity:  2 tablet   Refills:  0         CONTINUE these medicines which may have CHANGED, or have new prescriptions. If we are uncertain of the size of tablets/capsules you have at home, strength may be listed as something that might have changed.        Dose / Directions    aspirin 81 MG EC tablet   This may have changed:  when to take this   Used for:  Unstable angina (H)        Dose:  81 mg   Take 1 tablet (81 mg) by mouth daily   Quantity:  90 tablet   Refills:  3       atorvastatin 40 MG tablet   Commonly known as:  LIPITOR   This may have changed:  when to take this   Used for:  Hyperlipidemia LDL goal <100        Dose:  40 mg   Take 1 tablet (40 mg) by mouth daily   Quantity:  90 tablet   Refills:  3       hydrochlorothiazide 12.5 MG capsule   Commonly known as:  MICROZIDE   This may have changed:  additional instructions   Used for:  Essential hypertension with goal blood pressure less than 130/80        Dose:  12.5 mg   Take 1 capsule (12.5 mg) by mouth daily   Quantity:  90 capsule   Refills:  3       Phenytoin Sodium Extended 200 MG Caps   This may have changed:  additional instructions   Used for:  Seizure disorder (H)        Dose:  200 mg   Take 200 mg by mouth 2 times daily   Quantity:  60 capsule   Refills:  0         CONTINUE these medicines which have NOT CHANGED        Dose / Directions    ACETAMINOPHEN PO        Dose:  1000 mg   Take 1,000 mg by mouth every 6 hours as needed for fever Taking q4-6 hours, per MAR   Refills:  0       ADVAIR DISKUS 250-50 MCG/DOSE diskus inhaler   Generic drug:  fluticasone-salmeterol        Dose:  1 puff   Inhale 1 puff into the lungs 2 times daily   Refills:  0       albuterol (2.5 MG/3ML) 0.083% neb solution   Used for:  Chronic obstructive pulmonary disease, unspecified COPD type (H)        INHALE 1 VIAL VIA NEBULIZER  EVERY 6 HOURS AS NEEDED   Quantity:  360 mL   Refills:  11       blood glucose monitoring meter device kit   Used for:  Type 2 diabetes, HbA1C goal < 8% (H)        Use to test blood sugars 3 times daily or as directed.   Quantity:  1 kit   Refills:  0       blood glucose monitoring test strip   Commonly known as:  ONE TOUCH ULTRA   Used for:  Type 2 diabetes mellitus with diabetic peripheral angiopathy without gangrene, without long-term current use of insulin (H)        Use to test blood sugars 3 times daily or as directed.   Quantity:  3 Box   Refills:  3       calcium citrate-vitamin D 315-250 MG-UNIT Tabs per tablet   Commonly known as:  CITRACAL   Used for:  Osteoporosis        Dose:  2 tablet   Take 2 tablets by mouth daily   Quantity:  120 tablet   Refills:  5       cetirizine 10 MG tablet   Commonly known as:  zyrTEC   Used for:  Seasonal allergic rhinitis, unspecified allergic rhinitis trigger        Dose:  10 mg   Take 1 tablet (10 mg) by mouth daily as needed for allergies   Quantity:  90 tablet   Refills:  3       CYANOCOBALAMIN PO        Dose:  2000 mcg   Take 2,000 mcg by mouth daily   Refills:  0       cyclobenzaprine 10 MG tablet   Commonly known as:  FLEXERIL   Used for:  Cervicalgia        Dose:  10 mg   Take 1 tablet (10 mg) by mouth 2 times daily as needed for muscle spasms   Quantity:  30 tablet   Refills:  1       diclofenac 1 % Gel topical gel   Commonly known as:  VOLTAREN   Used for:  Primary osteoarthritis of both hands        Dose:  2 g   Apply 2 g topically 4 times daily to hands   Quantity:  100 g   Refills:  11       dorzolamide 2 % ophthalmic solution   Commonly known as:  TRUSOPT        Dose:  1 drop   Place 1 drop into both eyes 2 times daily   Refills:  0       * EASY TOUCH LANCETS 30G/TWIST Misc        Refills:  0       * blood glucose monitoring lancets   Used for:  Type 2 diabetes mellitus with diabetic peripheral angiopathy without gangrene, without long-term current use of  insulin (H)        Use to test blood sugar 3 times daily or as directed.   Quantity:  100 each   Refills:  PRN       EPINEPHrine 0.15 MG/0.3ML injection 2-pack   Commonly known as:  EPIPEN JR   Used for:  Bee sting reaction, undetermined intent, subsequent encounter        Dose:  0.15 mg   Inject 0.3 mLs (0.15 mg) into the muscle as needed for anaphylaxis   Quantity:  0.6 mL   Refills:  1       ESCITALOPRAM OXALATE PO        Dose:  10 mg   Take 10 mg by mouth daily   Refills:  0       estradiol 1 MG tablet   Commonly known as:  ESTRACE   Used for:  Person who has had sex change operation        Dose:  1 mg   Take 1 tablet (1 mg) by mouth daily   Quantity:  90 tablet   Refills:  3       ferrous sulfate 325 (65 FE) MG tablet   Commonly known as:  IRON        Dose:  325 mg   Take 1 tablet (325 mg) by mouth 2 times daily With meals   Quantity:  60 tablet   Refills:  2       fluticasone 50 MCG/ACT spray   Commonly known as:  FLONASE        Dose:  1 spray   Spray 1 spray into both nostrils daily   Refills:  0       INCRUSE ELLIPTA 62.5 MCG/INH oral inhaler   Generic drug:  umeclidinium        Dose:  1 puff   Inhale 1 puff into the lungs daily   Refills:  0       RENÉE Pack        Dose:  1 packet   Take 1 packet by mouth 2 times daily   Refills:  0       latanoprost 0.005 % ophthalmic solution   Commonly known as:  XALATAN   Used for:  Primary open angle glaucoma, stage unspecified        Dose:  1 drop   Place 1 drop into both eyes At Bedtime   Quantity:  2.5 mL   Refills:  11       levETIRAcetam 500 MG tablet   Commonly known as:  KEPPRA   Used for:  Nausea        Dose:  500 mg   Take 1 tablet (500 mg) by mouth 2 times daily   Quantity:  180 tablet   Refills:  1       lidocaine 5 % Patch   Commonly known as:  LIDODERM   Used for:  Chronic pain syndrome, 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 (H)        APPLY 1 TO 3 PATCHES TO PAINFUL AREA AT ONCE FOR UP TO 12 HOURS WITHIN A 24 HOUR PERIOD REMOVE AFTER  12 HOURS   Quantity:  30 patch   Refills:  5       lisinopril 10 MG tablet   Commonly known as:  PRINIVIL/ZESTRIL   Used for:  Essential hypertension with goal blood pressure less than 130/80        Dose:  10 mg   Take 1 tablet (10 mg) by mouth daily   Quantity:  90 tablet   Refills:  3       MEDICATION GIVEN BY INTRATHECAL PUMP - INSTRUCTION        continuous May 19, 2017 - per [facility] [place] [phone]: Conc: Bupivacaine 20 mg/mL and Fentanyl 2000 mcg/mL. Continuous: Bupivacaine 7.265 mg/day and Fentanyl 726.5 mcg/day. Boluses: Up to 7 boluses per 24-hr period of Bupivicaine 0.599 mg and Fentanyl 59.9 mcg  Pump Refill Date at [facility]: 7/2/17   Refills:  0       metoprolol 25 MG 24 hr tablet   Commonly known as:  TOPROL-XL   Used for:  Old myocardial infarction        TAKE 1 TABLET (25 MG) BY MOUTH DAILY   Quantity:  30 tablet   Refills:  10       MULTIVITAMIN PO        Dose:  1 tablet   Take 1 tablet by mouth daily   Refills:  0       nitroGLYcerin 0.4 MG sublingual tablet   Commonly known as:  NITROSTAT   Used for:  Chronic systolic congestive heart failure (H), Old myocardial infarction        Dose:  0.4 mg   Place 1 tablet (0.4 mg) under the tongue every 5 minutes as needed for chest pain if you are still having symptoms after 3 doses (15 minutes) call 911.   Quantity:  25 tablet   Refills:  1       ONDANSETRON PO        Dose:  4 mg   Take 4 mg by mouth 3 times daily   Refills:  0       * order for DME   Used for:  Incontinence        Equipment being ordered: Depends briefs   Quantity:  1 Month   Refills:  11       * order for DME   Used for:  CVA (cerebral infarction), HTN (hypertension)        Equipment being ordered: Power Wheelchair   Quantity:  1 Device   Refills:  0       * order for DME   Used for:  Obstructive chronic bronchitis with exacerbation (H)        Equipment being ordered: Nebulizer and supplies   Quantity:  1 Units   Refills:  0       * order for  "DME   Used for:  Type 2 diabetes mellitus with other diabetic neurological complication        Equipment being ordered: Glucerna daily shakes with each meal   Quantity:  1 Box   Refills:  11       * order for DME   Used for:  Type 2 diabetes mellitus with diabetic peripheral angiopathy without gangrene, without long-term current use of insulin (H)        ACcu check BID   Quantity:  1 Device   Refills:  0       * order for DME   Used for:  Simple chronic bronchitis (H)        Equipment being ordered: Nebulizer and tubing supplies   Quantity:  1 Units   Refills:  0       * order for DME   Used for:  Chronic obstructive pulmonary disease, unspecified COPD type (H)        Equipment being ordered: CPAP supplies mask, hose, filters, cushion fax to [facility] at [phone] Disp: 10 DeviceRefills: prn Class: Local PrintStart: 2/10/2017   Quantity:  1 Device   Refills:  0       * order for DME   Used for:  COPD (chronic obstructive pulmonary disease) (H)        Equipment being ordered: CPAP supplies mask, hose, filters, cushion  fax to [facility] at [phone]   Quantity:  10 Device   Refills:  prn       * order for DME   Used for:  Critical lower limb ischemia        Equipment being ordered: wheelchair seat cushion   Quantity:  1 Device   Refills:  0       * order for DME   Used for:  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 (H)        roho w/c cushion 18\" X 18\" for pressure relief   Quantity:  1 Device   Refills:  0       * order for DME   Used for:  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 (H)        Hospital bed with side rails   Quantity:  1 Device   Refills:  0       * order for DME   Used for:  Status post bilateral above knee amputation (H)        Full electric hospital bed with half rails  Dx: P20145, I110, J449 Length of need: lifetime   Quantity:  1 Device   Refills:  0       * order for DME   Used for:  Status post bilateral above knee amputation (H)        Wheel " Chair Cushion: 18 x 18 inch Roho cushion   Quantity:  1 Device   Refills:  0       pantoprazole 40 MG EC tablet   Commonly known as:  PROTONIX        Dose:  40 mg   Take 40 mg by mouth daily   Refills:  0       polyethylene glycol Packet   Commonly known as:  MIRALAX/GLYCOLAX        Dose:  17 g   Take 17 g by mouth daily Dissolved in water or juice   Refills:  0       * pregabalin 75 MG capsule   Commonly known as:  LYRICA   Used for:  Pain in both upper extremities        Dose:  150 mg   Take 2 capsules (150 mg) by mouth 2 times daily   Quantity:  120 capsule   Refills:  5       * LYRICA 100 MG capsule   Generic drug:  pregabalin        Refills:  0       prochlorperazine 10 MG tablet   Commonly known as:  COMPAZINE   Used for:  Nausea with vomiting        Dose:  10 mg   Take 1 tablet (10 mg) by mouth every 8 hours as needed   Quantity:  20 tablet   Refills:  0       risperiDONE 0.5 MG tablet   Commonly known as:  risperDAL        Dose:  0.5 mg   Take 0.5 mg by mouth At Bedtime   Refills:  0       sucralfate 1 GM tablet   Commonly known as:  CARAFATE   Used for:  Gastroesophageal reflux disease without esophagitis        Dose:  1 g   Take 1 tablet (1 g) by mouth 4 times daily May dissolve in 10 mL water is necessary. (Start upon completion of carafate suspension)   Quantity:  40 tablet   Refills:  5       * traZODone 100 MG tablet   Commonly known as:  DESYREL   Used for:  Anxiety state        Dose:  150 mg   Take 1.5 tablets (150 mg) by mouth At Bedtime   Quantity:  90 tablet   Refills:  3       * traZODone 50 MG tablet   Commonly known as:  DESYREL        Refills:  0       vitamin D 2000 UNITS tablet        Dose:  2000 Units   Take 2,000 Units by mouth daily.   Quantity:  100 tablet   Refills:  3       zinc 50 MG Tabs        Dose:  1 tablet   Take 1 tablet by mouth daily   Refills:  0       * Notice:  This list has 19 medication(s) that are the same as other medications prescribed for you. Read the directions  carefully, and ask your doctor or other care provider to review them with you.      STOP taking     SPIRIVA HANDIHALER 18 MCG capsule   Generic drug:  tiotropium                Where to get your medicines      These medications were sent to [facility] - [place], [place] - [address]  [address], [facility] [address]     Phone:  [phone]     levofloxacin 750 MG tablet               ANTIBIOTIC INSTRUCTION     You've Been Prescribed an Antibiotic - Now What?  Your healthcare team thinks that you or your loved one might have an infection. Some infections can be treated with antibiotics, which are powerful, life-saving drugs. Like all medications, antibiotics have side effects and should only be used when necessary. There are some important things you should know about your antibiotic treatment.      Your healthcare team may run tests before you start taking an antibiotic.    Your team may take samples (e.g., from your blood, urine or other areas) to run tests to look for bacteria. These test can be important to determine if you need an antibiotic at all and, if you do, which antibiotic will work best.      Within a few days, your healthcare team might change or even stop your antibiotic.    Your team may start you on an antibiotic while they are working to find out what is making you sick.    Your team might change your antibiotic because test results show that a different antibiotic would be better to treat your infection.    In some cases, once your team has more information, they learn that you do not need an antibiotic at all. They may find out that you don't have an infection, or that the antibiotic you're taking won't work against your infection. For example, an infection caused by a virus can't be treated with antibiotics. Staying on an antibiotic when you don't need it is more likely to be harmful than helpful.      You may experience side effects from your  antibiotic.    Like all medications, antibiotics have side effects. Some of these can be serious.    Let you healthcare team know if you have any known allergies when you are admitted to the hospital.    One significant side effect of nearly all antibiotics is the risk of severe and sometimes deadly diarrhea caused by Clostridium difficile (C. Difficile). This occurs when a person takes antibiotics because some good germs are destroyed. Antibiotic use allows C. diificile to take over, putting patients at high risk for this serious infection.    As a patient or caregiver, it is important to understand your or your loved one's antibiotic treatment. It is especially important for caregivers to speak up when patients can't speak for themselves. Here are some important questions to ask your healthcare team.    What infection is this antibiotic treating and how do you know I have that infection?    What side effects might occur from this antibiotic?    How long will I need to take this antibiotic?    Is it safe to take this antibiotic with other medications or supplements (e.g., vitamins) that I am taking?     Are there any special directions I need to know about taking this antibiotic? For example, should I take it with food?    How will I be monitored to know whether my infection is responding to the antibiotic?    What tests may help to make sure the right antibiotic is prescribed for me?      Information provided by:  www.cdc.gov/getsmart  U.S. Department of Health and Human Services  Centers for disease Control and Prevention  National Center for Emerging and Zoonotic Infectious Diseases  Division of Healthcare Quality Promotion         Protect others around you: Learn how to safely use, store and throw away your medicines at www.disposemymeds.org.             Medication List: This is a list of all your medications and when to take them. Check marks below indicate your daily home schedule. Keep this list as a  reference.      Medications           Morning Afternoon Evening Bedtime As Needed    ACETAMINOPHEN PO   Take 1,000 mg by mouth every 6 hours as needed for fever Taking q4-6 hours, per MAR   Last time this was given:  650 mg on 10/10/2017 12:57 PM                                ADVAIR DISKUS 250-50 MCG/DOSE diskus inhaler   Inhale 1 puff into the lungs 2 times daily   Generic drug:  fluticasone-salmeterol                                albuterol (2.5 MG/3ML) 0.083% neb solution   INHALE 1 VIAL VIA NEBULIZER EVERY 6 HOURS AS NEEDED                                aspirin 81 MG EC tablet   Take 1 tablet (81 mg) by mouth daily   Last time this was given:  81 mg on 10/11/2017  8:31 AM                                atorvastatin 40 MG tablet   Commonly known as:  LIPITOR   Take 1 tablet (40 mg) by mouth daily   Last time this was given:  40 mg on 10/10/2017  9:35 PM                                blood glucose monitoring meter device kit   Use to test blood sugars 3 times daily or as directed.                                blood glucose monitoring test strip   Commonly known as:  ONE TOUCH ULTRA   Use to test blood sugars 3 times daily or as directed.                                calcium citrate-vitamin D 315-250 MG-UNIT Tabs per tablet   Commonly known as:  CITRACAL   Take 2 tablets by mouth daily   Last time this was given:  2 tablets on 10/11/2017  8:31 AM                                cetirizine 10 MG tablet   Commonly known as:  zyrTEC   Take 1 tablet (10 mg) by mouth daily as needed for allergies                                CYANOCOBALAMIN PO   Take 2,000 mcg by mouth daily                                cyclobenzaprine 10 MG tablet   Commonly known as:  FLEXERIL   Take 1 tablet (10 mg) by mouth 2 times daily as needed for muscle spasms                                diclofenac 1 % Gel topical gel   Commonly known as:  VOLTAREN   Apply 2 g topically 4 times daily to hands                                 dorzolamide 2 % ophthalmic solution   Commonly known as:  TRUSOPT   Place 1 drop into both eyes 2 times daily   Last time this was given:  1 drop on 10/11/2017  8:32 AM                                * EASY TOUCH LANCETS 30G/TWIST Misc                                * blood glucose monitoring lancets   Use to test blood sugar 3 times daily or as directed.                                EPINEPHrine 0.15 MG/0.3ML injection 2-pack   Commonly known as:  EPIPEN JR   Inject 0.3 mLs (0.15 mg) into the muscle as needed for anaphylaxis                                ESCITALOPRAM OXALATE PO   Take 10 mg by mouth daily   Last time this was given:  20 mg on 10/11/2017  8:31 AM                                estradiol 1 MG tablet   Commonly known as:  ESTRACE   Take 1 tablet (1 mg) by mouth daily   Last time this was given:  1 mg on 10/11/2017  8:31 AM                                ferrous sulfate 325 (65 FE) MG tablet   Commonly known as:  IRON   Take 1 tablet (325 mg) by mouth 2 times daily With meals                                fluticasone 50 MCG/ACT spray   Commonly known as:  FLONASE   Spray 1 spray into both nostrils daily   Last time this was given:  1 spray on 10/11/2017  8:31 AM                                hydrochlorothiazide 12.5 MG capsule   Commonly known as:  MICROZIDE   Take 1 capsule (12.5 mg) by mouth daily                                INCRUSE ELLIPTA 62.5 MCG/INH oral inhaler   Inhale 1 puff into the lungs daily   Last time this was given:  1 puff on 10/11/2017  8:31 AM   Generic drug:  umeclidinium                                RENÉE Pack   Take 1 packet by mouth 2 times daily                                latanoprost 0.005 % ophthalmic solution   Commonly known as:  XALATAN   Place 1 drop into both eyes At Bedtime   Last time this was given:  1 drop on 10/10/2017  9:36 PM                                levETIRAcetam 500 MG tablet   Commonly known as:  KEPPRA   Take 1 tablet (500 mg) by mouth 2 times  daily   Last time this was given:  500 mg on 10/11/2017  8:32 AM                                levofloxacin 750 MG tablet   Commonly known as:  LEVAQUIN   Take 1 tablet (750 mg) by mouth daily                                lidocaine 5 % Patch   Commonly known as:  LIDODERM   APPLY 1 TO 3 PATCHES TO PAINFUL AREA AT ONCE FOR UP TO 12 HOURS WITHIN A 24 HOUR PERIOD REMOVE AFTER 12 HOURS                                lisinopril 10 MG tablet   Commonly known as:  PRINIVIL/ZESTRIL   Take 1 tablet (10 mg) by mouth daily   Last time this was given:  10 mg on 10/11/2017  8:31 AM                                MEDICATION GIVEN BY INTRATHECAL PUMP - INSTRUCTION   continuous May 19, 2017 - per [facility] in [place] [phone]: Conc: Bupivacaine 20 mg/mL and Fentanyl 2000 mcg/mL. Continuous: Bupivacaine 7.265 mg/day and Fentanyl 726.5 mcg/day. Boluses: Up to 7 boluses per 24-hr period of Bupivicaine 0.599 mg and Fentanyl 59.9 mcg  Pump Refill Date at [facility]: 7/2/17                                metoprolol 25 MG 24 hr tablet   Commonly known as:  TOPROL-XL   TAKE 1 TABLET (25 MG) BY MOUTH DAILY   Last time this was given:  25 mg on 10/11/2017  8:32 AM                                MULTIVITAMIN PO   Take 1 tablet by mouth daily                                nitroGLYcerin 0.4 MG sublingual tablet   Commonly known as:  NITROSTAT   Place 1 tablet (0.4 mg) under the tongue every 5 minutes as needed for chest pain if you are still having symptoms after 3 doses (15 minutes) call 911.                                ONDANSETRON PO   Take 4 mg by mouth 3 times daily   Last time this was given:  4 mg on 10/9/2017 11:03 AM                                * order for DME   Equipment being ordered: Depends briefs                                * order for DME   Equipment being ordered: Power Wheelchair                                * order for DME   Equipment being ordered: Nebulizer and supplies     "                            * order for DME   Equipment being ordered: Glucerna daily shakes with each meal                                * order for DME   ACcu check BID                                * order for DME   Equipment being ordered: Nebulizer and tubing supplies                                * order for DME   Equipment being ordered: CPAP supplies mask, hose, filters, cushion fax to [facility] at [phone] Disp: 10 DeviceRefills: prn Class: Local PrintStart: 2/10/2017                                * order for DME   Equipment being ordered: CPAP supplies mask, hose, filters, cushion  fax to [facility] at [phone]                                * order for DME   Equipment being ordered: wheelchair seat cushion                                * order for DME   roho w/c cushion 18\" X 18\" for pressure relief                                * order for DME   Hospital bed with side rails                                * order for DME   Full electric hospital bed with half rails  Dx: V92398, I110, J449 Length of need: lifetime                                * order for DME   Wheel Chair Cushion: 18 x 18 inch Roho cushion                                pantoprazole 40 MG EC tablet   Commonly known as:  PROTONIX   Take 40 mg by mouth daily   Last time this was given:  40 mg on 10/11/2017  8:32 AM                                Phenytoin Sodium Extended 200 MG Caps   Take 200 mg by mouth 2 times daily   Last time this was given:  200 mg on 10/11/2017  8:31 AM                                polyethylene glycol Packet   Commonly known as:  MIRALAX/GLYCOLAX   Take 17 g by mouth daily Dissolved in water or juice   Last time this was given:  17 g on 10/10/2017  7:58 AM                                * pregabalin 75 MG capsule   Commonly known as:  LYRICA   Take 2 capsules (150 mg) by mouth 2 times daily   Last time this was given:  150 mg on 10/11/2017  8:32 AM                                * LYRICA 100 " MG capsule   Last time this was given:  150 mg on 10/11/2017  8:32 AM   Generic drug:  pregabalin                                prochlorperazine 10 MG tablet   Commonly known as:  COMPAZINE   Take 1 tablet (10 mg) by mouth every 8 hours as needed                                risperiDONE 0.5 MG tablet   Commonly known as:  risperDAL   Take 0.5 mg by mouth At Bedtime   Last time this was given:  0.5 mg on 10/10/2017  9:39 PM                                sucralfate 1 GM tablet   Commonly known as:  CARAFATE   Take 1 tablet (1 g) by mouth 4 times daily May dissolve in 10 mL water is necessary. (Start upon completion of carafate suspension)   Last time this was given:  1 g on 10/11/2017  8:31 AM                                * traZODone 100 MG tablet   Commonly known as:  DESYREL   Take 1.5 tablets (150 mg) by mouth At Bedtime   Last time this was given:  100 mg on 10/10/2017  9:35 PM                                * traZODone 50 MG tablet   Commonly known as:  DESYREL   Last time this was given:  100 mg on 10/10/2017  9:35 PM                                vitamin D 2000 UNITS tablet   Take 2,000 Units by mouth daily.   Last time this was given:  2,000 Units on 10/11/2017  8:31 AM                                zinc 50 MG Tabs   Take 1 tablet by mouth daily                                * Notice:  This list has 19 medication(s) that are the same as other medications prescribed for you. Read the directions carefully, and ask your doctor or other care provider to review them with you.

## 2017-10-08 NOTE — ED NOTES
Pt presents via EMS from nursing home. Per report Pt has had Abd pain, nausea and emesis since Thursday. Pt has been taking home medications without relief. tp given 50 mg fentanyl in rout by EMS with improvement of pain. Pt also received home nausea medications with relief prior to arrival. Pt presently A and O x 4. Denies diarrhea and bloody stools or emesis. EMS . Pt has hx diabetes.

## 2017-10-08 NOTE — LETTER
Transition Communication Hand-off for Care Transitions to Next Level of Care Provider    Name: Sonya Foote  MRN #: [id number]  Primary Care Provider: Allan Casey     Primary Clinic: [address] [facility] [address]     Reason for Hospitalization:  Pyelonephritis, acute [N10]  Admit Date/Time: 10/8/2017  4:34 PM  Discharge Date: 10/11/17  Payor Source: Payor: MEDICA / Plan: [employer] / Product Type: HMO /     Readmission Assessment Measure (BRAEDEN) Risk Score/category: High    Reason for Communication Hand-off Referral: Multiple providers/specialties    Discharge Plan: back to [facility] with home care       Concern for non-adherence with plan of care:   Y/N no  Discharge Needs Assessment:  Needs       Most Recent Value    Equipment Currently Used at Home wheelchair, power, tub bench, transfer board, grab bar    Transportation Available agency transportation, public transportation    Assisted Living -- [[facility]]    Other Resources Home Care    Home Care [facility] [phone], Fax: [phone]          Already enrolled in Tele-monitoring program and name of program:  no  Follow-up specialty is recommended: Yes    Follow-up plan:  Future Appointments  Date Time Provider Department Center   10/18/2017 12:00 PM Elizabeth Oliver MD [facility]   10/23/2017 11:20 AM Allan Casey MD [facility]   11/2/2017 9:15 AM [facility]   11/10/2017 9:20 AM [facility]   12/29/2017 10:00 AM Monserrat Polk APRN CNP [facility]   2/19/2018 9:30 AM Alina Jennings MD [facility]       Referrals     Future Labs/Procedures    Home care nursing referral     Comments:    [facility]    RN skilled nursing visit. RN to assess respiratory and cardiac status, pain level and activity tolerance and hydration, nutrition and bowel status.    Your provider has ordered home care nursing  services. If you have not been contacted within 2 days of your discharge please call the inpatient department phone number at [phone] .    Home Care OT Referral for Hospital Discharge     Comments:    [facility]    OT to eval and treat    Your provider has ordered home care - occupational therapy. If you have not been contacted within 2 days of your discharge please call the department phone number listed on the top of this document.            Key Recommendations:  See AVS    Vida Wheeler RN, BSN  Care Coordinator [facility] [address]  Pager: [phone]  Phone: [phone]    AVS/Discharge Summary is the source of truth; this is a helpful guide for improved communication of patient story

## 2017-10-08 NOTE — IP AVS SNAPSHOT
[address] [address]    Phone:  [phone]                                       After Visit Summary   10/8/2017    Sonya Foote    MRN: [id number]           After Visit Summary Signature Page     I have received my discharge instructions, and my questions have been answered. I have discussed any challenges I see with this plan with the nurse or doctor.    ..........................................................................................................................................  Patient/Patient Representative Signature      ..........................................................................................................................................  Patient Representative Print Name and Relationship to Patient    ..................................................               ................................................  Date                                            Time    ..........................................................................................................................................  Reviewed by Signature/Title    ...................................................              ..............................................  Date                                                            Time

## 2017-10-08 NOTE — ED PROVIDER NOTES
History     Chief Complaint   Patient presents with     Abdominal Pain     HPI  Sonya Foote is a 67 year old female with a history of CAD, s/p inferior MI with COLLEEN to pRCA & mRCA 9/17/2016, HTN, HLD, PVD, s/p bilateral AKA,  h/o LLE blood clot prior to amputation, sickle cell trait, h/o anemia, COPD, asthma, GERD, dysphagia with history of esophageal stricture, s/p dilation, NIDDM, neuropathy, chronic low back pain with intrathecal pump, seizure disorder, h/o CVA, s/p stent to left carotid,  depression/anxiety, schizoaffective disorder who presents to the ED with complaints of abdominal pain. Patient reports she has had 8-10 episodes of emesis since Thursday (3 days ago) with associated symptoms of lightheadedness, headaches, and low grade fevers (99.1). She states she has fell out of her chair due to her lightheadedness and reports having to strap herself to her hair in order to stay in it. Patient denies a previous history of abdominal surgeries.     I have reviewed the Medications, Allergies, Past Medical and Surgical History, and Social History in the 24/7 Card system.    Past Medical History:   Diagnosis Date     Anemia      Antiplatelet or antithrombotic long-term use      Arthritis      AS (sickle cell trait) (H) 10/8/2013     Blind left eye      BPPV (benign paroxysmal positional vertigo)      Chronic back pain      COPD (chronic obstructive pulmonary disease) (H)      Coronary artery disease      CVA (cerebral infarction) 10/8/2012    x3, residual left sided weakness     Diastolic CHF (H) 9/4/2012     Dilantin toxicity 5/31/2013     Esophageal candidiasis (H)      GERD (gastroesophageal reflux disease)      Heart attack      Hernia, abdominal      History of blood transfusion     x5     History of thrombophlebitis      HTN (hypertension) 9/4/2012     Hyperlipidemia LDL goal <100 9/4/2012     Legally blind in right eye, as defined in [place]     No periphal vision right eye     Osteoporosis 1/21/2013     Other  chronic pain      PAD (peripheral artery disease) (H)      Palpitations      Person who has had sex change operation 1/20/2014     Pneumonia      Schizoaffective disorder (H)      Seizure disorder (H) 9/4/2012     Seizures (H)      Sleep apnea     CPAP     Thrombosis of leg      Type 2 diabetes, HbA1C goal < 8% (H) 9/4/2012     Uncomplicated asthma      Unspecified cerebral artery occlusion with cerebral infarction        Past Surgical History:   Procedure Laterality Date     AMPUTATE LEG ABOVE KNEE Left 6/11/2016    Procedure: AMPUTATE LEG ABOVE KNEE;  Surgeon: Mello Rodriguez MD;  [facility]     AMPUTATE LEG BELOW KNEE Right 11/7/2016    Procedure: AMPUTATE LEG BELOW KNEE;  Surgeon: Savannah Durant MD;  [facility]     AMPUTATE REVISION STUMP LOWER EXTREMITY Right 11/11/2016    Procedure: AMPUTATE REVISION STUMP LOWER EXTREMITY;  Surgeon: Savannah Durant MD;  [facility]     AMPUTATE REVISION STUMP LOWER EXTREMITY Right 11/16/2016    Procedure: AMPUTATE REVISION STUMP LOWER EXTREMITY;  Surgeon: Savannah Durant MD;  [facility]     AMPUTATE TOE(S) Right 1/5/2016    Procedure: AMPUTATE TOE(S);  Surgeon: Mello Gaines DPM;  Location: [facility]     ANGIOGRAM Bilateral 11/21/2014    Procedure: ANGIOGRAM;  Surgeon: Savannah Durant MD;  [facility]     ANGIOGRAM Left 1/16/2015    Procedure: ANGIOGRAM;  Surgeon: Savannah Durant MD;  [facility]     ANGIOGRAM Bilateral 9/14/2015    Procedure: ANGIOGRAM;  Surgeon: Savannah Durant MD;  [facility]     ANGIOGRAM Left 10/12/2015    Procedure: ANGIOGRAM;  Surgeon: Savannah Durant MD;  [facility]     ANGIOGRAM Right 6/6/2016    Procedure: ANGIOGRAM;  Surgeon: Savannah Durant MD;  [facility]     ANGIOPLASTY Right 6/6/2016    Procedure: ANGIOPLASTY;  Surgeon: Savannah Durant MD;  [facility]     APPENDECTOMY       BREAST SURGERY      right breast bx (benign)     CHOLECYSTECTOMY       COLONOSCOPY N/A 8/25/2014    Procedure: COLONOSCOPY;  Surgeon:  Mello Ferrer MD;  [facility]     COLONOSCOPY WITH CO2 INSUFFLATION N/A 8/20/2014    Procedure: COLONOSCOPY WITH CO2 INSUFFLATION;  Surgeon: Duane, William Charles, MD;  Location: [facility]     ENDARTERECTOMY FEMORAL  5/23/2014    Procedure: ENDARTERECTOMY FEMORAL;  Surgeon: Jason Joshi MD;  [facility]     ESOPHAGOSCOPY, GASTROSCOPY, DUODENOSCOPY (EGD), COMBINED  12/14/2012    Procedure: COMBINED ESOPHAGOSCOPY, GASTROSCOPY, DUODENOSCOPY (EGD), BIOPSY SINGLE OR MULTIPLE;  ESOPHAGOSCOPY, GASTROSCOPY, DUODENOSCOPY (EGD), DILATATION ;  Surgeon: Elizabeth Stevenson MD;  Location:  GI     ESOPHAGOSCOPY, GASTROSCOPY, DUODENOSCOPY (EGD), COMBINED  12/31/2013    Procedure: COMBINED ESOPHAGOSCOPY, GASTROSCOPY, DUODENOSCOPY (EGD), BIOPSY SINGLE OR MULTIPLE;;  Surgeon: Clemente Lopez MD;  [facility]     ESOPHAGOSCOPY, GASTROSCOPY, DUODENOSCOPY (EGD), COMBINED  4/1/2014    Procedure: COMBINED ESOPHAGOSCOPY, GASTROSCOPY, DUODENOSCOPY (EGD);;  Surgeon: Clemente Lopez MD;  [facility]     ESOPHAGOSCOPY, GASTROSCOPY, DUODENOSCOPY (EGD), COMBINED  6/28/2014    Procedure: COMBINED ESOPHAGOSCOPY, GASTROSCOPY, DUODENOSCOPY (EGD);  Surgeon: Clemente Lopez MD;  [facility]     ESOPHAGOSCOPY, GASTROSCOPY, DUODENOSCOPY (EGD), COMBINED N/A 8/20/2014    Procedure: COMBINED ESOPHAGOSCOPY, GASTROSCOPY, DUODENOSCOPY (EGD), BIOPSY SINGLE OR MULTIPLE;  Surgeon: Duane, William Charles, MD;  Location:  OR     ESOPHAGOSCOPY, GASTROSCOPY, DUODENOSCOPY (EGD), COMBINED N/A 8/22/2014    Procedure: COMBINED ESOPHAGOSCOPY, GASTROSCOPY, DUODENOSCOPY (EGD), BIOPSY SINGLE OR MULTIPLE;  Surgeon: Mello Ferrer MD;  [facility]     ESOPHAGOSCOPY, GASTROSCOPY, DUODENOSCOPY (EGD), COMBINED N/A 10/2/2014    Procedure: COMBINED ESOPHAGOSCOPY, GASTROSCOPY, DUODENOSCOPY (EGD), BIOPSY SINGLE OR MULTIPLE;  Surgeon: Remy Haskins MD;  Location:  GI     ESOPHAGOSCOPY, GASTROSCOPY, DUODENOSCOPY (EGD), COMBINED Left  12/15/2014    Procedure: COMBINED ESOPHAGOSCOPY, GASTROSCOPY, DUODENOSCOPY (EGD), BIOPSY SINGLE OR MULTIPLE;  Surgeon: Remy Haskins MD;  [facility]     ESOPHAGOSCOPY, GASTROSCOPY, DUODENOSCOPY (EGD), COMBINED N/A 2/25/2015    Procedure: COMBINED ENDOSCOPIC ULTRASOUND, ESOPHAGOSCOPY, GASTROSCOPY, DUODENOSCOPY (EGD), FINE NEEDLE ASPIRATE/BIOPSY;  Surgeon: Clemente Lugo MD;  [facility]     ESOPHAGOSCOPY, GASTROSCOPY, DUODENOSCOPY (EGD), COMBINED Left 2/25/2015    Procedure: COMBINED ESOPHAGOSCOPY, GASTROSCOPY, DUODENOSCOPY (EGD), BIOPSY SINGLE OR MULTIPLE;  Surgeon: Clemente Lugo MD;  [facility]     ESOPHAGOSCOPY, GASTROSCOPY, DUODENOSCOPY (EGD), COMBINED N/A 9/25/2016    Procedure: COMBINED ESOPHAGOSCOPY, GASTROSCOPY, DUODENOSCOPY (EGD);  Surgeon: Aziza Patiño MD;  [facility]     ESOPHAGOSCOPY, GASTROSCOPY, DUODENOSCOPY (EGD), COMBINED N/A 1/18/2017    Procedure: COMBINED ESOPHAGOSCOPY, GASTROSCOPY, DUODENOSCOPY (EGD), BIOPSY SINGLE OR MULTIPLE;  Surgeon: Clemente Lopez MD;  [facility]     FASCIOTOMY LOWER EXTREMITY Left 6/10/2016    Procedure: FASCIOTOMY LOWER EXTREMITY;  Surgeon: Mello Rodriguez MD;  [facility]     HC CAPSULE ENDOSCOPY N/A 8/25/2014    Procedure: CAPSULE/PILL CAM ENDOSCOPY;  Surgeon: eRmy Haskins MD;  [facility]     HC CAPSULE ENDOSCOPY N/A 10/2/2014    Procedure: CAPSULE/PILL CAM ENDOSCOPY;  Surgeon: Remy Haskins MD;  [facility]     ORTHOPEDIC SURGERY      broken wrist repair     SEX TRANSFORMATION SURGERY, MALE TO FEMALE      1974     SINUS SURGERY      cyst removed     TONSILLECTOMY       VASCULAR SURGERY      Left carotid stent       Family History   Problem Relation Age of Onset     Dementia Mother      Glaucoma Mother      DIABETES Mother      may have been type 1, childhood     Coronary Artery Disease Mother      MI     Glaucoma Father      DIABETES Father      may hev been type 1 - ??     Heart Failure  Father      CANCER Maternal Aunt      leukemia     Schizophrenia Brother      Depression Brother      Suicide Sister      Depression Sister      DIABETES Sister      CANCER Maternal Aunt      ovarian     Glaucoma Maternal Grandmother      DIABETES Maternal Grandmother      Glaucoma Maternal Grandfather      DIABETES Maternal Grandfather      Glaucoma Paternal Grandmother      DIABETES Paternal Grandmother      Glaucoma Paternal Grandfather      DIABETES Paternal Grandfather      Breast Cancer Sister      CEREBROVASCULAR DISEASE Brother      Colon Cancer No family hx of        Social History   Substance Use Topics     Smoking status: Former Smoker     Packs/day: 2.50     Years: 30.00     Types: Cigarettes, Cigars     Quit date: 11/1/2000     Smokeless tobacco: Never Used     Alcohol use No       Current Facility-Administered Medications   Medication     0.9% sodium chloride BOLUS    Followed by     0.9% sodium chloride BOLUS     Current Outpatient Prescriptions   Medication     cyclobenzaprine (FLEXERIL) 10 MG tablet     LYRICA 100 MG capsule     blood glucose monitoring (ONE TOUCH ULTRASOFT) lancets     blood glucose monitoring (ONE TOUCH ULTRA) test strip     traZODone (DESYREL) 100 MG tablet     polyethylene glycol (MIRALAX/GLYCOLAX) Packet     ESCITALOPRAM OXALATE PO     ACETAMINOPHEN PO     pregabalin (LYRICA) 75 MG capsule     cetirizine (ZYRTEC) 10 MG tablet     diclofenac (VOLTAREN) 1 % GEL topical gel     lisinopril (PRINIVIL/ZESTRIL) 10 MG tablet     pantoprazole (PROTONIX) 40 MG EC tablet     risperiDONE (RISPERDAL) 0.5 MG tablet     ferrous sulfate (IRON) 325 (65 FE) MG tablet     sucralfate (CARAFATE) 1 GM tablet     albuterol (2.5 MG/3ML) 0.083% neb solution     ondansetron (ZOFRAN-ODT) 4 MG ODT tab     CYANOCOBALAMIN PO     Nutritional Supplements (RENÉE) PACK     ADVAIR DISKUS 250-50 MCG/DOSE diskus inhaler     calcium citrate-vitamin D (CITRACAL) 315-250 MG-UNIT TABS     hydrochlorothiazide  (MICROZIDE) 12.5 MG capsule     levETIRAcetam (KEPPRA) 500 MG tablet     aspirin EC 81 MG EC tablet     blood glucose monitoring (ONE TOUCH ULTRA 2) meter device kit     phenytoin 200 MG CAPS     dorzolamide (TRUSOPT) 2 % ophthalmic solution     atorvastatin (LIPITOR) 40 MG tablet     prochlorperazine (COMPAZINE) 10 MG tablet     Cholecalciferol (VITAMIN D) 2000 UNITS tablet     Multiple Vitamin (MULTIVITAMIN OR)     traZODone (DESYREL) 50 MG tablet     SPIRIVA HANDIHALER 18 MCG capsule     lidocaine (LIDODERM) 5 % Patch     estradiol (ESTRACE VAGINAL) 0.1 MG/GM cream     metoprolol (TOPROL-XL) 25 MG 24 hr tablet     order for DME     order for DME     order for DME     order for DME     EPINEPHrine (EPIPEN JR) 0.15 MG/0.3ML injection     order for DME     order for DME     order for DME     order for DME     order for DME     fluticasone (FLONASE) 50 MCG/ACT nasal spray     estradiol (ESTRACE) 1 MG tablet     zinc 50 MG TABS     nitroglycerin (NITROSTAT) 0.4 MG SL tablet     MEDICATION GIVEN BY INTRATHECAL PUMP - INSTRUCTION     latanoprost (XALATAN) 0.005 % ophthalmic solution     order for DME     ORDER FOR DME     ORDER FOR DME     ORDER FOR DME     EASY TOUCH LANCETS 30G/TWIST MISC     Facility-Administered Medications Ordered in Other Encounters   Medication     neostigmine (PROSTIGMINE) injection     glycopyrrolate (ROBINUL) injection          Allergies   Allergen Reactions     Bee Venom      Penicillins Anaphylaxis     Other reaction(s): Skin Rash and/or Hives     Dilantin [Phenytoin] Other (See Comments)     Generic dilantin only per pt     Iodide Hives     09/11/15: Pt states she can use iodine and doesn't have any problems      Iodine-131      Novocaine [Procaine] Hives     Other reaction(s): Skin Rash and/or Hives       Review of Systems   Constitutional: Positive for fever. Negative for chills.   Respiratory: Negative for cough and shortness of breath.    Cardiovascular: Negative for chest pain and  palpitations.   Gastrointestinal: Positive for abdominal pain, diarrhea and vomiting.   Neurological: Positive for light-headedness and headaches.   All other systems reviewed and are negative.      Physical Exam   BP: 129/64  Pulse: 91  Temp: 99.9  F (37.7  C)  Resp: 16  Weight: 67.1 kg (147 lb 14.9 oz)  SpO2: 100 %  Physical Exam   Constitutional: She is oriented to person, place, and time. She appears well-developed and well-nourished. No distress.   HENT:   Mouth/Throat: Oropharynx is clear and moist.   Eyes: Conjunctivae are normal. Pupils are equal, round, and reactive to light.   Cardiovascular: Normal rate, regular rhythm and normal heart sounds.    Pulmonary/Chest: Effort normal and breath sounds normal.   Abdominal:       Musculoskeletal: She exhibits no edema.   Neurological: She is alert and oriented to person, place, and time.   Skin: Skin is warm and dry. No rash noted.   Psychiatric: She has a normal mood and affect.       ED Course     ED Course     Procedures             EKG Interpretation:      Interpreted by Nicko Piña  Time reviewed: 1535  Symptoms at time of EKG: abdominal pain   Rhythm: normal sinus   Rate: normal  Axis: normal  Ectopy: none  Conduction: normal; voltage criteria for LVH  ST Segments/ T Waves: No ST-T wave changes  Q Waves: none  Comparison to prior: unchanged    Clinical Impression: abnormal EKG       Labs Ordered and Resulted from Time of ED Arrival Up to the Time of Departure from the ED - No data to display         Assessments & Plan (with Medical Decision Making)   67-year-old female history of chronic abdominal pain, prior nephrolithiasis, pyelonephritis, AAA, partial small bowel obstruction, ischemic colitis now arriving to the Emergency Department with low-grade abdominal pain, nausea, and recurrent emesis. Upon arrival she is noted to be alert, she is afebrile, and hemodynamically stable. On initial evaluation, she is seated upright on the phone and appears  nontoxic. She does have mild pain at deep palpation on the left. No generalized abdominal pain, distention, or involuntary guarding. My suspicion at this time for acute intra-abdominal process is low. She feels some similarity to previous nephrolithiasis. With continued nausea and vomiting for 4 days  time, we discussed initiation of IV fluids, antiemetics, and laboratory studies with non-contrast CT imaging of the abdomen and pelvis. Pending negative workup, we will focus on antiemetics and slowly advance diet.    CT scan demonstrates no sign of acute intra-abdominal pathology. Laboratory studies do reveal a leukocytosis with left shift and grossly positive urinalysis.  On evaluation of previous hospitalization for pyelonephritis, the patient has been found to have multidrug resistance infections ultimately transitioned to Levaquin.  At this time, she s been unable to keep down PO fluids in the emergency department and does clinically appear somewhat dehydrated. I do believe at this time she d benefit from a brief hospitalization for further fluid resuscitation and slowly advanced diet. She s remained hemodynamically stable in the emergency department and I think appropriate for floor level of care at this time.    This part of the document was transcribed by Lidia Valencia Medical Scribe.    I have reviewed the nursing notes.    I have reviewed the findings, diagnosis, plan and need for follow up with the patient.    New Prescriptions    No medications on file       Final diagnoses:   Pyelonephritis, acute   IKatty, am serving as a trained medical scribe to document services personally performed by Nicko Piña MD, based on the provider's statements to me.    Nicko MONREAL MD, was physically present and have reviewed and verified the accuracy of this note documented by Katty Malcolm.     10/8/2017   [facility], EMERGENCY DEPARTMENT     Nicko Piña MD  10/08/17 1945

## 2017-10-09 ENCOUNTER — CARE COORDINATION (OUTPATIENT)
Dept: GERIATRIC MEDICINE | Facility: CLINIC | Age: 67
End: 2017-10-09

## 2017-10-09 LAB
GLUCOSE BLDC GLUCOMTR-MCNC: 79 MG/DL (ref 70–99)
GLUCOSE BLDC GLUCOMTR-MCNC: 81 MG/DL (ref 70–99)
GLUCOSE BLDC GLUCOMTR-MCNC: 85 MG/DL (ref 70–99)
GLUCOSE BLDC GLUCOMTR-MCNC: 92 MG/DL (ref 70–99)
INTERPRETATION ECG - MUSE: NORMAL
MAGNESIUM SERPL-MCNC: 2 MG/DL (ref 1.6–2.3)
PHOSPHATE SERPL-MCNC: 3.2 MG/DL (ref 2.5–4.5)

## 2017-10-09 PROCEDURE — 99233 SBSQ HOSP IP/OBS HIGH 50: CPT | Mod: GC | Performed by: INTERNAL MEDICINE

## 2017-10-09 PROCEDURE — 12000001 ZZH R&B MED SURG/OB UMMC

## 2017-10-09 PROCEDURE — 25000132 ZZH RX MED GY IP 250 OP 250 PS 637: Performed by: PEDIATRICS

## 2017-10-09 PROCEDURE — 00000146 ZZHCL STATISTIC GLUCOSE BY METER IP

## 2017-10-09 PROCEDURE — 40000275 ZZH STATISTIC RCP TIME EA 10 MIN

## 2017-10-09 PROCEDURE — 25800025 ZZH RX 258: Performed by: INTERNAL MEDICINE

## 2017-10-09 PROCEDURE — 40000895 ZZH STATISTIC SLP IP EVAL DEFER

## 2017-10-09 PROCEDURE — 25000128 H RX IP 250 OP 636: Performed by: PEDIATRICS

## 2017-10-09 PROCEDURE — 25000125 ZZHC RX 250: Performed by: PEDIATRICS

## 2017-10-09 PROCEDURE — 25000128 H RX IP 250 OP 636: Performed by: HOSPITALIST

## 2017-10-09 PROCEDURE — 94660 CPAP INITIATION&MGMT: CPT

## 2017-10-09 RX ORDER — FENTANYL CITRATE 50 UG/ML
25 INJECTION, SOLUTION INTRAMUSCULAR; INTRAVENOUS
Status: DISCONTINUED | OUTPATIENT
Start: 2017-10-09 | End: 2017-10-11 | Stop reason: HOSPADM

## 2017-10-09 RX ORDER — DEXTROSE MONOHYDRATE, SODIUM CHLORIDE, AND POTASSIUM CHLORIDE 50; .745; 4.5 G/1000ML; G/1000ML; G/1000ML
INJECTION, SOLUTION INTRAVENOUS CONTINUOUS
Status: DISCONTINUED | OUTPATIENT
Start: 2017-10-09 | End: 2017-10-11 | Stop reason: HOSPADM

## 2017-10-09 RX ORDER — PREGABALIN 75 MG/1
150 CAPSULE ORAL 2 TIMES DAILY
Status: DISCONTINUED | OUTPATIENT
Start: 2017-10-09 | End: 2017-10-11 | Stop reason: HOSPADM

## 2017-10-09 RX ADMIN — LATANOPROST 1 DROP: 50 SOLUTION/ DROPS OPHTHALMIC at 01:09

## 2017-10-09 RX ADMIN — SUCRALFATE 1 G: 1 TABLET ORAL at 01:16

## 2017-10-09 RX ADMIN — DORZOLAMIDE HYDROCHLORIDE 1 DROP: 20 SOLUTION/ DROPS OPHTHALMIC at 01:09

## 2017-10-09 RX ADMIN — PHENYTOIN SODIUM 200 MG: 100 CAPSULE, EXTENDED RELEASE ORAL at 10:34

## 2017-10-09 RX ADMIN — FLUTICASONE PROPIONATE 1 SPRAY: 50 SPRAY, METERED NASAL at 10:36

## 2017-10-09 RX ADMIN — SUCRALFATE 1 G: 1 TABLET ORAL at 16:34

## 2017-10-09 RX ADMIN — DORZOLAMIDE HYDROCHLORIDE 1 DROP: 20 SOLUTION/ DROPS OPHTHALMIC at 19:52

## 2017-10-09 RX ADMIN — ONDANSETRON 4 MG: 2 INJECTION INTRAMUSCULAR; INTRAVENOUS at 17:11

## 2017-10-09 RX ADMIN — Medication 81 MG: at 10:35

## 2017-10-09 RX ADMIN — PANTOPRAZOLE SODIUM 40 MG: 40 TABLET, DELAYED RELEASE ORAL at 10:37

## 2017-10-09 RX ADMIN — PHENYTOIN SODIUM 200 MG: 100 CAPSULE, EXTENDED RELEASE ORAL at 01:24

## 2017-10-09 RX ADMIN — PROCHLORPERAZINE EDISYLATE 5 MG: 5 INJECTION INTRAMUSCULAR; INTRAVENOUS at 09:25

## 2017-10-09 RX ADMIN — GABAPENTIN 300 MG: 300 CAPSULE ORAL at 01:18

## 2017-10-09 RX ADMIN — ZINC SULFATE CAP 220 MG (50 MG ELEMENTAL ZN) 220 MG: 220 (50 ZN) CAP at 10:38

## 2017-10-09 RX ADMIN — POTASSIUM CHLORIDE, DEXTROSE MONOHYDRATE AND SODIUM CHLORIDE: 75; 5; 450 INJECTION, SOLUTION INTRAVENOUS at 12:58

## 2017-10-09 RX ADMIN — LEVETIRACETAM 500 MG: 500 TABLET ORAL at 10:34

## 2017-10-09 RX ADMIN — ENOXAPARIN SODIUM 40 MG: 40 INJECTION SUBCUTANEOUS at 21:20

## 2017-10-09 RX ADMIN — SUCRALFATE 1 G: 1 TABLET ORAL at 21:19

## 2017-10-09 RX ADMIN — ATORVASTATIN CALCIUM 40 MG: 40 TABLET, FILM COATED ORAL at 21:19

## 2017-10-09 RX ADMIN — LEVOFLOXACIN 750 MG: 5 INJECTION, SOLUTION INTRAVENOUS at 19:46

## 2017-10-09 RX ADMIN — FLUTICASONE FUROATE AND VILANTEROL TRIFENATATE 1 PUFF: 100; 25 POWDER RESPIRATORY (INHALATION) at 10:33

## 2017-10-09 RX ADMIN — ESTRADIOL 1 MG: 1 TABLET ORAL at 10:37

## 2017-10-09 RX ADMIN — DORZOLAMIDE HYDROCHLORIDE 1 DROP: 20 SOLUTION/ DROPS OPHTHALMIC at 10:38

## 2017-10-09 RX ADMIN — RISPERIDONE 0.5 MG: 0.5 TABLET ORAL at 21:19

## 2017-10-09 RX ADMIN — SUCRALFATE 1 G: 1 TABLET ORAL at 13:04

## 2017-10-09 RX ADMIN — TRAZODONE HYDROCHLORIDE 100 MG: 100 TABLET ORAL at 01:22

## 2017-10-09 RX ADMIN — ROPINIROLE HYDROCHLORIDE 0.75 MG: 0.25 TABLET, FILM COATED ORAL at 01:13

## 2017-10-09 RX ADMIN — Medication 2 TABLET: at 10:35

## 2017-10-09 RX ADMIN — RISPERIDONE 0.5 MG: 0.5 TABLET ORAL at 01:21

## 2017-10-09 RX ADMIN — ONDANSETRON 4 MG: 4 TABLET, ORALLY DISINTEGRATING ORAL at 11:03

## 2017-10-09 RX ADMIN — POLYETHYLENE GLYCOL 3350 17 G: 17 POWDER, FOR SOLUTION ORAL at 10:39

## 2017-10-09 RX ADMIN — TRAZODONE HYDROCHLORIDE 100 MG: 100 TABLET ORAL at 21:19

## 2017-10-09 RX ADMIN — UMECLIDINIUM 1 PUFF: 62.5 AEROSOL, POWDER ORAL at 10:32

## 2017-10-09 RX ADMIN — VITAMIN D, TAB 1000IU (100/BT) 2000 UNITS: 25 TAB at 10:37

## 2017-10-09 RX ADMIN — PHENYTOIN SODIUM 200 MG: 100 CAPSULE, EXTENDED RELEASE ORAL at 19:45

## 2017-10-09 RX ADMIN — LATANOPROST 1 DROP: 50 SOLUTION/ DROPS OPHTHALMIC at 21:19

## 2017-10-09 RX ADMIN — PREGABALIN 150 MG: 75 CAPSULE ORAL at 13:04

## 2017-10-09 RX ADMIN — LEVETIRACETAM 500 MG: 500 TABLET ORAL at 19:45

## 2017-10-09 RX ADMIN — ESCITALOPRAM OXALATE 20 MG: 20 TABLET ORAL at 10:37

## 2017-10-09 RX ADMIN — PREGABALIN 150 MG: 75 CAPSULE ORAL at 19:45

## 2017-10-09 RX ADMIN — LEVETIRACETAM 500 MG: 500 TABLET ORAL at 01:26

## 2017-10-09 RX ADMIN — ONDANSETRON 4 MG: 2 INJECTION INTRAMUSCULAR; INTRAVENOUS at 05:02

## 2017-10-09 ASSESSMENT — PAIN DESCRIPTION - DESCRIPTORS: DESCRIPTORS: DULL

## 2017-10-09 NOTE — PLAN OF CARE
Problem: Patient Care Overview  Goal: Plan of Care/Patient Progress Review  Outcome: No Change  Pt up to floor at 2130 from ED. Patient from assisted living facility. Admitted with UTI, nausea, vomiting and abdominal pain. WBC 16.9. Has fentanyl intrathecal pump in right hip. Given one dose of levaquin in ED, given 2L fluid bolus in ED, IVF infusing at 125ml/hr. Given IV zofran for nausea. Uses bedpan for toileting. C/o feeling warm, given ice packs for comfort. Continue to monitor.

## 2017-10-09 NOTE — PROGRESS NOTES
[facility]  Patient is currently open to home care services with [facility].  The patient is currently receiving OT services. RN was recently discharged.  [facility]  and team have been notified of patient admission.  [facility] liaison will continue to follow patient during stay.  If appropriate provide orders to resume home care at time of discharge.    Thank you  Keshia Schwab RN, BSN  [facility]ia[facility]  [phone]

## 2017-10-09 NOTE — PROGRESS NOTES
"CLINICAL NUTRITION SERVICES - ASSESSMENT NOTE     Nutrition Prescription    RECOMMENDATIONS FOR MDs/PROVIDERS TO ORDER:  Continue with diet as tolerated     Malnutrition Status:     Patient does not meet two of the above criteria necessary for diagnosing malnutrition but is at risk for malnutrition    Recommendations already ordered by Registered Dietitian (RD):  Ordered Glucerna with meals    Future/Additional Recommendations:  None at this time       REASON FOR ASSESSMENT  Sonya Foote is a/an 67 year old female assessed by the dietitian for Admission Nutrition Risk Screen for reduced oral intake over the last month    Chart reviewed: Pt with PMH of:   DM2,   PAD s/p bilateral lower extremity amputations, (Amputation above the knee left - 6/2016, Amputation below the knee right - 11/7/2016)  Diastolic Heart failure,   CVAs x3  with residual right sided weakness.     - Per MD note, \"Pt with Dysphagia 2/2 esophageal strictures s/p previous dilatations  Chronic erosive GERD. Most recent dilation in 9/2016 at [facility]. Patient complaining of difficulty swallowing\".     - Admitted with sepsis 2/2 UTI, nausea, vomiting and abdominal pain.    NUTRITION HISTORY  - Patient resides in an assisted living facility. Per patient, poor po intake over the past 4-5 days due to N/V and abdominal pain.   - Per patient, eats soft foods. Has diabetes so watches her sugar intake. Supplements her oral intake with Glucerna at home.     CURRENT NUTRITION ORDERS  Diet: Mechanical/Dental Soft   Intake/Tolerance: Patient reports inability to eat much due to severe nausea. Not feeling well enough to eat. Patient in agreement with a trial of Glucerna oral supplements to improve kcal /protein intake pending ability to tolerate intake.      LABS  Labs reviewed    MEDICATIONS  IVF infusing at 125 ml/hr.    ANTHROPOMETRICS  Height: 0 cm (Data Unavailable) - 170.2 cm   Most Recent Weight: 67.1 kg (147 lb 14.9 oz) - admit dry wt on 10/8  IBW: 54 " "kg adjusted IBW - ( IBW of 61.4 kg - 11.8% for bilateral below the knee amputation =  54 kg new IBW).   %IBW: 124% IBW  BMI: 23.8 kg /m2   Weight History:   Wt Readings from Last 10 Encounters:   10/08/17 69 kg (152 lb 1.6 oz)   10/03/17 67.1 kg (148 lb)   09/08/17 67.1 kg (148 lb)   09/06/17 60.3 kg (133 lb)   08/28/17 60.3 kg (133 lb)   08/24/17 60.3 kg (133 lb)   08/13/17 60.3 kg (133 lb)   07/25/17 60.3 kg (133 lb)   07/19/17 60.3 kg (133 lb)   06/01/17 61.8 kg (136 lb 3.9 oz)       Dosing Weight: 57 kg adjusted wt     ASSESSED NUTRITION NEEDS  Estimated Energy Needs: 1425 - 1710  kcals/day (25 - 30 kcals/kg)  Justification: Maintenance  Estimated Protein Needs: 57 - 68  grams protein/day (1 - 1.2 grams of pro/kg)  Justification: Maintenance  Estimated Fluid Needs: (1 mL/kcal)   Justification: Maintenance    PHYSICAL FINDINGS  Extremities: Per MD note, \"Bilateral above knee amputations. Stumps well healed\".     MALNUTRITION  % Intake: </= 50% for >/= 5 days (severe)  % Weight Loss: None noted  Subcutaneous Fat Loss: None observed  Muscle Loss: None observed ( arms)  Fluid Accumulation/Edema: None noted upper body  Malnutrition Diagnosis: Patient does not meet two of the above criteria necessary for diagnosing malnutrition but is at risk for malnutrition    NUTRITION DIAGNOSIS  Inadequate oral intake related to altered GI, inhibiting ability to take sufficient PO as evidenced by patients report of minimal po intake over the last 4-5 days      INTERVENTIONS  Implementation  Nutrition Education: Discussed oral supplements availability, encouraged small frequent intake pending tolerance     Medical food supplement therapy - Ordered Glucerna     Goals  Patient to consume % of nutritionally adequate meal trays TID, or the equivalent with supplements/snacks.     Monitoring/Evaluation  Progress toward goals will be monitored and evaluated per protocol.    Pepe Kruse RD/[email or web address]  Pager [phone]      "

## 2017-10-09 NOTE — H&P
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     Sepsis 2/2 urinary tract infection versus pyelonephritis  Recurrent urinary tract infections  Dirty UA in the setting of recurrent UTIs secondary to incontinence and multiple prior pelvic surgeries. Meets SIRS criteria with leukocytosis and tachycardia, hemodynamically otherwise stable with normal lactate. Followed by urology and long term plan is for suprapubic cather once able to come off anticoagulation which just happened last week. CT negative for stones or hydronephrosis.    - Obtain blood cultures    - Follow urine cultures    - Outpatient with urology to discuss suprapubic catheter placement    - Continue levofloxacin for now, narrow based on results of urine culture    - Rebolus as needed    - Stool management: miralax, docusate    Polypharmacy  Patient with a very large medication list. She is unable to tell me about any of her medications as her facility is in charge of them. I had the facility send a list, but it is dated 3/23/2017 and is very different than the medication list I have in our chart. In addition she has multiple conflicting medications such as being prescribed both gabapentin and lyrica.     - Pharmacy med rec consult placed    - Contact the patient's assisted living facility again to get most recent MAR ([phone] option 2)    - Adjust medications in the morning    - Followed by [facility] Pharmacist, see note 7/31/17 for further details    CAD  Recent NSTEMI 9/2016 s/p stenting, completed 1  year of plavix   HFpEF   HTN  Stress test 10/2017 showing inferior wall MI without significant ischemia, plan per cards was to stop plavix as of 10/2/17. No active chest pain at this time.     - Continue ASA    - Holding antihypertensives given sepsis (metoprolol, lisinopril, HCTZ)    - No further plavix needed per cards note 10/2/17    - Continue atorvastatin     Transgender  BPH  Of note, patient does have a prostate.     - Holding tamsulosin given concern for sepsis    - Continue estrogen hormone replacement therapy    PAD s/p bilateral lower extremity amputations  Multiple previous surgeries. Bilateral femoral stenting in the past.     - Continue ASA    - May need to discuss with vascular surgery if plavix is recommended for her longterm given her extensive surgery (last documented in their note 12/2016)    Chronic pain  Followed in pain clinic has pain pump which delivers continuous fentanyl as well as a max of 7 bumps daily. Patient did not bring her button to give herself bumps    - Fentanyl pain pump in place to provide continuous doses    - Fentanyl 25 mcg q3h prn for breakthrough    - Tylenol prn     - Obtain records from pain clinic to ensure we are in keeping with her typical pain regimen    - Continuing gabapentin    - Holding lyrica for now, do not understand why she would be on both gabapentin and lyrica, will need to check out her MAR from nursing home    Dysphagia 2/2 esophageal strictures s/p previous dilatations  Chronic erosive GERD  Most recent dilation in 9/2016 at [facility]. Patient complaining of difficulty swallowing.     - Pantoprazole 40mg qday    - Carafate 1g 4 times daily prn    - Speech therapy consult in the AM    - Zofran and compazine for prn vomiting    Allergic rhinitis  COPD  Last PFTs 3/2015 with FEV1 95%, DLCO 64%.    - Zyrtec 10mg qday prn    - Flonase 1 spray bilateral nares qday    - Albuterol 2.5mg 0.083% q6h prn    - Advair 250-50 1 puff 2 times daily    - Spiriva 18  mcg qday    Seizure disorder    - Continue Keppra 500mg bid and Phenytoin 200mg bid    Glaucoma    - Continue dorzolamide and lantanoprost    Restless leg syndrome    - Continue lyrica 150mg bid    - D/c gabapentin and ropinerole (recieved doses tonight, but was stopped previous per 7/2017 [facility] note)    Depression  Anxiety  Schizoaffective disorder  Insomnia  In grief counseling for the loss of her significant other, has made passive thoughts about letting herself die naturally by stopping her medications. Denies any active SI or plans.     - Trazodone prn for sleep    - Escitalopram 10mg qday    - Risperidone 0.5mg qhs    JOSE    - Continue home CPAP    History of chronic anemia  Hgb within normal limits currently     - Holding home vitamin b12 and iron      FEN: bolus as needed  Lines: PIV  DVT: lovenox  Code status: DNR/DNI. This is a new code status for the patient this admission which she relates to the recent loss of her significant other.   Dispo: pending urine culture speciation and tolerating oral antibiotics anticipate 2 days.     Clinical decision making discussed with Dr. Matt Ruth who is in agreement with the above plan.     Sissy Miles MD  Med-Peds PGY-4  [phone]            Chief Complaint:   I think I have an UTI.      History is obtained from the patient and electronic health record          History of Present Illness:   This patient is a 67 year old transgender female (born with ambiguous genitalia, raised as a female had sexual surgery to remove penis in 1964 and vaginoplasty in 1978, now identifies as both male and female, prefers female pronouns) with a history of T2DM, CAD s/p PCI in 9/2016, HFpEF, PAD s/p bilateral above knee amputations, and chronic pain with pain pump in place who presents with abdominal pain and concern for recurrent UTI.    The patient reports she was just treated for UTI by her PCP starting 9/22/17 with macrobid. Urine culture at that time showed >100k colonies of  citrobacter sensitive to quinolones, bactrim and macrobid. She was treated with macrobid for a total of seven days. The patient thinks her symptoms improved, but she never got back to normal. Then two days ago she developed worsening abdominal pain, increased urinary frequency and smelly urine prompting her to worry about another UTI. She has been afebrile and notes some back pain, but this seems to be more of a chronic issue than anything. Other recent antibiotic courses have included levofloxacin 7/2017 and 6/2017, ciprofloxacin 2/2017and 12/2016, and bactrim 10/2016 all for UTIs.    The patient has a long standing history of UTI and pyelonephritis for which she is followed by urology. The frequency of UTIs has accelerated since her bilateral lower extremity amputations because she has difficulty to the bathroom and has to use diapers when she leaves the house. She reports she is mostly incontinent of urine and occasionally of stool as well. Her last visit to urology was 7/2017 at which point they started vaginal estrogen cream for UTI prevention without success. The plan was for suprapubic catheter placement once the patient is off plavix. Plavix was just stopped on 10/2/17 after a year long course for NSTEMI with PCI.        ED COURSE:   The patient was hemodynamically stable on presentation. Given a dirty UA she was started on levofloxacin for suspected UTI along with 1L fluid bolus. She received fentanyl x1 for pain.            Past Medical History:   Transgender (male to female)  CVA x3, residual left sided weakness  Type 2 diabetes mellitus  CAD  Diastolic heart failure  HTN  HLD  Peripheral artery disease s/p amputation  COPD  Chronic back pain  Sickle cell trait  Chronic anemia   History of blood transfusion x5  Thrombophlebitis  Arthritis  Dilantin toxicity  Blind left eye  BPPV  Esophageal candidiasis  GERD  Abdominal hernia  Osteoporosis  Palpitations  Pneumonia  Schizoaffective disorder  Seizure  disorder  Sleep apnea  Thrombosis of leg         Past Surgical History:   Amputation right toes - 1/5/2016  Amputation above the knee left - 6/2016  Amputation below the knee right - 11/7/2016  Amputation right stump revision - 11/11/2016  Amputation right stump revision - 11/16/2016    Fasciotomy lower extremity - 6/10/2016    Multiple angiograms lower extremities, most recently 6/2016  Angioplasty - 6/6/2016  Femoral endarterectomy - 5/23/2014    Breast surgery - right breast biopsy    Appendectomy  Cholecystectomy    Colonoscopy - last 8/2014  Capsule endoscopy - last 10/2/2014  EGD, multiple - last 1/2017    Broken wrist repair     Sex transformation surgery (male to female): 1974    Sinus surgery, cyst removed  Tonsillectomy            Social History:   Lives in the [facility] facility.   All of her medications, cooking, and daily bathing / activities of daily living needs are fulfilled by nursing help.   Was  to a woman for the last 30 years who passed away this Spring from liver disease.    Alcohol use: none  Tobacco use: former smoker, quit 16 years ago, but restarted smoking one week ago due to stress.          Family History:     Family History   Problem Relation Age of Onset     Dementia Mother      Glaucoma Mother      DIABETES Mother      may have been type 1, childhood     Coronary Artery Disease Mother      MI     Glaucoma Father      DIABETES Father      may hev been type 1 - ??     Heart Failure Father      CANCER Maternal Aunt      leukemia     Schizophrenia Brother      Depression Brother      Suicide Sister      Depression Sister      DIABETES Sister      CANCER Maternal Aunt      ovarian     Glaucoma Maternal Grandmother      DIABETES Maternal Grandmother      Glaucoma Maternal Grandfather      DIABETES Maternal Grandfather      Glaucoma Paternal Grandmother      DIABETES Paternal Grandmother      Glaucoma Paternal Grandfather      DIABETES Paternal  Grandfather      Breast Cancer Sister      CEREBROVASCULAR DISEASE Brother      Colon Cancer No family hx of               Allergies:     Allergies   Allergen Reactions     Bee Venom      Penicillins Anaphylaxis     Other reaction(s): Skin Rash and/or Hives     Dilantin [Phenytoin] Other (See Comments)     Generic dilantin only per pt     Iodide Hives     09/11/15: Pt states she can use iodine and doesn't have any problems      Iodine-131      Novocaine [Procaine] Hives     Other reaction(s): Skin Rash and/or Hives             Medications:   Lisinopril 2.5mg qday  HCTZ 12.5mg qday  Metoprolol 25mg qday  ASA 81mg qday  Clopidogrel 75mg qday -- stopped 10/2/17 per cardiology note  Atorvastatin 40mg qhs  Nitroglycerin prn    Tamsulosin 0.4mg qhs    Keppra 500mg bid  Phenytoin 200mg bid  Ropinirole 0.75mg qhs    Escitalopram 20mg qday  Risperidone 0.5mg qhs    Intrathecal pump - bupivacaine and fentanyl pump    - continuous: bupivacaine 7.265 mg/day + fentanyl 726.5 mcg/day    - bolus: up to 7 boluses per 24 hour period: bupivicaine 0.599mg and fentanyl 59.9 mcg  Lyrica 150mg bid   Trazodone 100mg qhs    Pantoprazole 40mg qday  Carafate 1g 4 times daily prn  Miralax 17g qday  Docusate 100mg qday  Lactulose 20g prn for constipation  Zofran 4mg q6h prn  Compazine 10mg tid prn    Albuterol 2.5mg 0.083% q6h prn  Zyrtec 10mg qday prn  Flonase 1 spray bilateral nares qday  Advair 250-50 1 puff 2 times daily  Spiriva 18 mcg qday    Iron 325mg bid  Cyanocobalamin 2000 mcg qday  Zinc 50mg qday  Vitamin D 2000 units qday  MVA 1 tablet qday    Estradiol 1mg qday    Dorzolamide (trusopt) 2% ophthalmic soln 1 drop bilateral eyes bid  Latanoprost 0.005% 1 drop each eye qhs      Listed on our medication list, but not included in the list the patient's facility sent to us this evening. That said, the list looks as though it was dated from 3/23/2017.    Epipen 0.15mg  Estradiol 0.1 mg/gm cream dime size amount 3x a week  Taqueria pack 1  packet 2 times daily  Calcium-Vitamin D 2 tablets daily  Flexeril 10mg bid prn  Acetaminophen 1000mg q6h prn  Lidoderm patches 1-3 patches qd prn  Diclofenac 1% gel 2g qid prn         Review of Systems:   A 10 point ROS is negative other than the symptoms noted above in the HPI.             Physical Exam:   Vitals were reviewed  /64  Pulse 91  Temp 99.9  F (37.7  C) (Oral)  Resp 26  Wt 67.1 kg (147 lb 14.9 oz)  SpO2 96%  BMI 23.17 kg/m2    Exam:  General: the patient is pleasant, resting comfortably, no acute distress.   Neck: supple, full range of motion, no masses  Lymph: no cervical lymphadenopathy  HEENT: Normocephalic, atraumatic. MMM. No oral lesions. No scleral icterus or conjunctivitis.   Lungs: Breathing comfortably on room air, no increased work of breathing. Lungs clear to auscultation, no wheezes or crackles.   CV: RRR, nl s1 and s2, no murmurs  Abdomen: Soft, nondistended, nontender. No rebound or guarding. Bowel sounds active.  Extremities: Bilateral above knee amputations. Stumps well healed. Patient has diffuse tenderness over the left stump. No redness or fluid collection. No drainage.   Skin: no appreciable skin lesions/rashes on exposed skin.   Neuro: Alert and oriented x4, grossly normal neurologic exam.   Psych: normal affect, answering questions appropriately. No obvious depression or anxiety.           Data:   Labs:     CMP: na 138, k 3.8, cl 103, co2 28, bun 12, cr 0.63, ca 9    LFTs: alb 2.9, tp 8.9, tbili 0.3, alp 225, alt 45, ast 48    Lactate 0.8    Lipase 156    Trop <0.015    Glucose 85    CBC: wbc 16.9, hgb 12.3, plt 268    UA: 10 protein, positive nitrite, small blood, moderate leuk esterase, 20 wbc, few bacteria, 7 rbcs    UCx pending    Blood culture pending     Urine cultures reviewed from previous:    9/2017: citrobacter sensitive to quinolones, bactrim and macrobid    5/2017: Klebsiella intermediate nitrofurantoin sensitivity, levofloxacin susceptible. 2nd strain  mesha badillo sensitivity    12/2016:  Klebsiella intermediate nitrofurantoin sensitivity    8/2/2016: Klebsiella intermediate nitrofurantoin sensitivity    6/2016: Enterococcus pan sensitive     5/2014: Klebsiella nitrofurantoin resistant, enterococcus levofloxacin susceptible    Cardiac studies:  EKG: normal sinus rhythm with VR of 95. QTc 447.     Imaging studies:  CT abd/pelvis: No renal, ureteral, or urinary bladder stones.

## 2017-10-09 NOTE — PROGRESS NOTES
Care Coordinator Progress Note     Admission Date/Time:  10/8/2017  Attending MD:  Michelet Jenkins MD     Data  Chart reviewed, discussed with interdisciplinary team.   Patient was admitted for:    Pyelonephritis, acute  Abdominal pain.    Concerns with insurance coverage for discharge needs: None.  Current Living Situation: Patient lives in an assisted living facility.  Support System: Supportive and Involved  Services Involved: DME and Home Care  Transportation: [place] transportation,  [employer] [facility] [facility] [phone]  Barriers to Discharge: chronically ill and dependent with mobility/activities of daily living    Coordination of Care and Referrals: Provided patient/family with options for Home Care.        Assessment  Spoke with patient at bedside.  Patient lives at [facility] (ph: [phone], fax: [phone]) .  Nursing sets up medications.   through her insurance is Fernando Sharma (phone: [phone]). Patient is currently open with [facility] for OT.  Resumption orders placed.  Patient will need wheelchair accessible van for transportation upon discharge.    _______________________  [facility]  Phone  [phone]  Fax  [phone]  ______________________       Plan  Anticipated Discharge Date:  1-2 days  Anticipated Discharge Plan:  Back to [facility] with [facility]    Vida Wheeler RN, BSN  Care Coordinator Eve [facility] [address]  Pager: [phone]  Phone: [phone]

## 2017-10-09 NOTE — SIGNIFICANT EVENT
SPIRITUAL HEALTH SERVICES Significant Event  Hinduism Sacrament of ANOINTING  [facility] 5B    Pt anointed by Father Sampson Levy, [facility]  , per request of patient.    Father Sampson Brewer

## 2017-10-09 NOTE — PROGRESS NOTES
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      #Sepsis 2/2 UTI  #Recurrent UTIs  Dirty UA in the setting of recurrent UTIs secondary to incontinence and multiple prior pelvic surgeries. Met SIRS criteria on admission with leukocytosis to 16.9 and tachycardia, o/w HDS and lactate wnl. Followed by urology as outpatient and long term plan is for suprapubic cather once able to come off anticoagulation which just happened last week. CT negative for stones or hydronephrosis.  - BCx ngtd  - UCx >100k GNRs, awaiting speciation/sensitivities  - Outpatient f/u with urology to discuss suprapubic catheter placement  - Continue levofloxacin pending sensitivities  - mIVF given N/V and poor po intake     #Polypharmacy  Patient with a very large medication list.  - Pharmacy med rec consult placed, appreciate assistance  - Attempted to contact the patient's [facility] at [phone], option 2, awaiting fax back  - Followed by [facility] Pharmacist, see note 7/31/17 for further details     #CAD  #Recent NSTEMI 9/2016 s/p stenting, completed 1 year of plavix   #HFpEF   #HTN  Stress test 10/2017 showing inferior wall MI without significant ischemia, plan per cards was to stop plavix as of 10/2/17. No active chest pain at this time.   - Continue ASA  - Holding antihypertensives given sepsis (metoprolol, lisinopril, HCTZ)  - No further plavix needed per cards note 10/2/17  - Continue atorvastatin     #Transgender  #BPH  Of note, patient does have a prostate.   - Holding tamsulosin given concern for sepsis  - Continue estrogen hormone replacement therapy     #PAD s/p bilateral lower  extremity amputations  Multiple previous surgeries. Bilateral femoral stenting in the past.   - Continue ASA  - OK to d/c plavix as above; discussed with vascular surgery and no need for plavix this far out from stenting     #Chronic pain  Followed in pain clinic has pain pump which delivers continuous fentanyl as well as a max of 7 bumps daily. Patient did not bring her button to give herself bumps  - Fentanyl pain pump in place to provide continuous doses  - Fentanyl 25 mcg q3h prn for breakthrough (has PCA at home but she didn't bring it, this seems to be managing her pain sufficiently thus far)  - Tylenol prn   - Obtain records from pain clinic to ensure we are in keeping with her typical pain regimen  - Continue pta lyrica, d/c gabapentin (redundant on MAR)     #Dysphagia 2/2 esophageal strictures s/p previous dilatations  #Chronic erosive GERD  Most recent dilation in 9/2016 at [facility]. Patient complaining of difficulty swallowing.   - Pantoprazole 40mg qday  - Carafate 1g 4 times daily prn  - SLP recently saw as outpatient and follows with her, no need for inpatient consult at this time, continue mechanical/soft diet  - Zofran and compazine for prn vomiting, iv when not tolerating po d/t vomiting     #Allergic rhinitis  #COPD  Last PFTs 3/2015 with FEV1 95%, DLCO 64%.  - Zyrtec 10mg qday prn  - Flonase 1 spray bilateral nares qday  - Albuterol 2.5mg 0.083% q6h prn  - Advair 250-50 1 puff 2 times daily  - d/c Spiriva 18 mcg qday - also on Incruse (Umeclidinium per MAR) and will continue that     #Seizure disorder  - Continue Keppra 500mg bid and Phenytoin 200mg bid     #Glaucoma  - Continue dorzolamide and lantanoprost     #Restless leg syndrome  - Continue lyrica 150mg bid  - D/c gabapentin and ropinerole (recieved doses tonight, but was stopped previous per 7/2017 [facility] note)     #Depression  #Anxiety  #Schizoaffective disorder  #Insomnia  In grief counseling for the loss of her significant other, has  made passive thoughts about letting herself die naturally by stopping her medications. Denies any active SI or plans.     - Trazodone prn for sleep    - Escitalopram 10mg qday    - Risperidone 0.5mg qhs     #JOSE    - Continue home CPAP     #History of chronic anemia  Hgb within normal limits currently     - Holding home vitamin b12 and iron     FEN: mIVF with D5-1/2NS-10KCl  Lines: PIV  DVT: lovenox  Code status: DNR/DNI    Disposition Plan   Expected discharge: 2 - 3 days, recommended to prior living arrangement once adequate pain management/ tolerating PO medications and antibiotic plan established.     Entered: Chi Ramirez 10/09/2017, 2:06 PM   Information in the above section will display in the discharge planner report.      The patient's care was discussed with the Bedside Nurse and Patient.    Chi Ramirez MD PGY2  Internal Medicine  Pager [phone]  [address]  Please see sticky note for cross cover information    Interval History   NAEO. Still with lower abdominal pain. No F/C, no CP or SOB. With nausea/vomiting improved with meds, tolerated some lunch.     Physical Exam   /60 (BP Location: Right arm)  Pulse 82  Temp 99.4  F (37.4  C) (Oral)  Resp 16  Wt 69 kg (152 lb 1.6 oz)  SpO2 99%  BMI 23.82 kg/m2  Weight: 152 lbs 1.6 oz  Gen: Comfortable, resting with bipap mask in place, NAD  HEENT: NCAT, EOMI  Neck: Supple  Pulm: CTAB without wheezes or crackles  CV: RRR, S1/S2, no m/r/g  ABD: Soft, mild suprapubic ttp, no flank ttp, normal BS, no organomegaly  EXT: b/l AKA  Skin: No rashes or skin lesions noted  NEURO: AOx3, nonfocal  Psych: Appropriate mood and affect    Data     Recent Labs  Lab 10/08/17  1744   WBC 16.9*   HGB 12.3   MCV 89         POTASSIUM 3.8   CHLORIDE 103   CO2 28   BUN 12   CR 0.63   ANIONGAP 8   CAROL 9.0   GLC 85   ALBUMIN 2.9*   PROTTOTAL 8.9*   BILITOTAL 0.3   ALKPHOS 225*   ALT 45   AST 48*   LIPASE 156   TROPI <0.015       Internal Medicine Staff  Addendum  Date of Service: 10/9/2017  I have seen and examined Ms. Foote, reviewed the data and discussed the plan of care with the patient and the care team on P&FC Rounds.  I agree with the above documentation     I discussed pt's care with bedside RN, case management/social work today.  I personally reviewed labs, medications and past 24 hr notes.  Assessment/Plan/Diagnoses: plan/dx as above, which contains my edits and reflects our joint medical decision-making.     Michelet Jenkins MD  Internal Medicine/Pediatrics Hospitalist & Staff Physician   of Internal Medicine and Pediatrics  [facility]  Pager: [phone]

## 2017-10-09 NOTE — SUMMARY OF CARE
Belongings: Green jacket, rubber underwear, black leggings, 2 black/grey striped gloves, 2 black velcro braces for hands, black Tshirt, watch,leather purse, glasses, gold ring, cell phone & , wallet & check book (sent to security), No medications with pt.

## 2017-10-09 NOTE — PLAN OF CARE
Problem: Patient Care Overview  Goal: Plan of Care/Patient Progress Review     SLP 5B: Clinical swallow evaluation orders received. Per review of chart, pt has significant hx of dysphagia (most recent SLP notes indicates 20-30 yr hx). Pt recently had VFSS with esophagram as outpatient on 9/21/2017, where she was recommended mechanical dental soft diet and thin liquids. Esophagram significant for small hiatal hernia and reflux. Per brief interview, pt with no acute concerns this admission and has f/u appointment on 10/11/2017. Contacted medical team; plan to defer evaluation at this time.      Cont: mechanical/dental soft diet and medications crushed in applesauce. Reflux precautions.

## 2017-10-09 NOTE — PROGRESS NOTES
10/9/17: CM received notification that member was admitted to hospital from ED for UTI.   CM left  for coordination at [facility].   CM spoke with member - she states she is starting to feel a little better but still no appetite and weak.  She states she has f/u with Urology on 10/18.  The plavix has been stopped per cardiology - she is hoping she can have suprapubic catheter placed in near future.   Member states she wants DNR/DNI while in hospital.  She states that after d/c she wants to remain full code and son will decide.       AIDE log started.  PCP aware of hospitalization.     Jamie Sharma RN, PHN  [employer]

## 2017-10-09 NOTE — PHARMACY-ADMISSION MEDICATION HISTORY
Admission medication history interview status for the 10/8/2017 admission is complete. See Epic admission navigator for allergy information, pharmacy, prior to admission medications and immunization status.     Medication history interview sources:  [facility] -[phone]    Changes made to PTA medication list (reason)  Added: Incruse Ellipta  Deleted: Estrace Vaginal Cream-not on MAR  Changed: none    Additional medication history information (including reliability of information, actions taken by pharmacist):     NOTE: Nursing home MAR has her on both Spiriva and Incruse Ellipta inhalers. Both of these medications are long acting anticholinergic inhalers, and she should be on one or the other, not both.          Prior to Admission medications    Medication Sig Last Dose Taking? Auth Provider   umeclidinium (INCRUSE ELLIPTA) 62.5 MCG/INH oral inhaler Inhale 1 puff into the lungs daily 10/8/2017 Yes Unknown, Entered By History   ONDANSETRON PO Take 4 mg by mouth 3 times daily 10/8/2017 Yes Unknown, Entered By History   cyclobenzaprine (FLEXERIL) 10 MG tablet Take 1 tablet (10 mg) by mouth 2 times daily as needed for muscle spasms 10/8/2017 at Unknown time Yes Allan Casey MD   traZODone (DESYREL) 50 MG tablet   Yes Reported, Patient   SPIRIVA HANDIHALER 18 MCG capsule  10/8/2017 Yes Reported, Patient   LYRICA 100 MG capsule  10/8/2017 at Unknown time Yes Reported, Patient   lidocaine (LIDODERM) 5 % Patch APPLY 1 TO 3 PATCHES TO PAINFUL AREA AT ONCE FOR UP TO 12 HOURS WITHIN A 24 HOUR PERIOD REMOVE AFTER 12 HOURS 10/8/2017 Yes Allan Casey MD   blood glucose monitoring (ONE TOUCH ULTRASOFT) lancets Use to test blood sugar 3 times daily or as directed. 10/8/2017 at Unknown time Yes Allan Casey MD   blood glucose monitoring (ONE TOUCH ULTRA) test strip Use to test blood sugars 3 times daily or as directed. 10/8/2017 at Unknown time Yes Allan Casey MD   metoprolol (TOPROL-XL) 25 MG 24 hr tablet  TAKE 1 TABLET (25 MG) BY MOUTH DAILY 10/8/2017 Yes Allan Casey MD   traZODone (DESYREL) 100 MG tablet Take 1.5 tablets (150 mg) by mouth At Bedtime 10/7/2017 at Unknown time Yes Allan Casey MD   polyethylene glycol (MIRALAX/GLYCOLAX) Packet Take 17 g by mouth daily Dissolved in water or juice 10/8/2017 at Unknown time Yes Unknown, Entered By History   ESCITALOPRAM OXALATE PO Take 10 mg by mouth daily 10/8/2017 at Unknown time Yes Unknown, Entered By History   ACETAMINOPHEN PO Take 1,000 mg by mouth every 6 hours as needed for fever Taking q4-6 hours, per MAR 10/8/2017 at Unknown time Yes Unknown, Entered By History   pregabalin (LYRICA) 75 MG capsule Take 2 capsules (150 mg) by mouth 2 times daily 10/8/2017 at Unknown time Yes Allan Casey MD   cetirizine (ZYRTEC) 10 MG tablet Take 1 tablet (10 mg) by mouth daily as needed for allergies 10/8/2017 at Unknown time Yes Allan Casey MD   diclofenac (VOLTAREN) 1 % GEL topical gel Apply 2 g topically 4 times daily to hands 10/8/2017 at Unknown time Yes Allan Casey MD   lisinopril (PRINIVIL/ZESTRIL) 10 MG tablet Take 1 tablet (10 mg) by mouth daily 10/8/2017 at Unknown time Yes Bj Anderson MD   pantoprazole (PROTONIX) 40 MG EC tablet Take 40 mg by mouth daily 10/8/2017 at Unknown time Yes Reported, Patient   risperiDONE (RISPERDAL) 0.5 MG tablet Take 0.5 mg by mouth At Bedtime 10/8/2017 at Unknown time Yes Reported, Patient   ferrous sulfate (IRON) 325 (65 FE) MG tablet Take 1 tablet (325 mg) by mouth 2 times daily With meals 10/8/2017 at Unknown time Yes Allan Casey MD   sucralfate (CARAFATE) 1 GM tablet Take 1 tablet (1 g) by mouth 4 times daily May dissolve in 10 mL water is necessary. (Start upon completion of carafate suspension) 10/8/2017 at Unknown time Yes Allan Casey MD   albuterol (2.5 MG/3ML) 0.083% neb solution INHALE 1 VIAL VIA NEBULIZER EVERY 6 HOURS AS NEEDED 10/8/2017 at Unknown time Yes Allan Casey MD    CYANOCOBALAMIN PO Take 2,000 mcg by mouth daily 10/8/2017 at Unknown time Yes Reported, Patient   Nutritional Supplements (RENÉE) PACK Take 1 packet by mouth 2 times daily 10/8/2017 at Unknown time Yes Reported, Patient   fluticasone (FLONASE) 50 MCG/ACT nasal spray Spray 1 spray into both nostrils daily 10/8/2017 Yes Reported, Patient   ADVAIR DISKUS 250-50 MCG/DOSE diskus inhaler Inhale 1 puff into the lungs 2 times daily  10/8/2017 at Unknown time Yes Reported, Patient   calcium citrate-vitamin D (CITRACAL) 315-250 MG-UNIT TABS Take 2 tablets by mouth daily 10/8/2017 at Unknown time Yes Allan Casey MD   hydrochlorothiazide (MICROZIDE) 12.5 MG capsule Take 1 capsule (12.5 mg) by mouth daily  Patient taking differently: Take 12.5 mg by mouth daily Hold for sbp<90 10/8/2017 at Unknown time Yes Allan Casey MD   estradiol (ESTRACE) 1 MG tablet Take 1 tablet (1 mg) by mouth daily 10/8/2017 Yes Allan Casey MD   levETIRAcetam (KEPPRA) 500 MG tablet Take 1 tablet (500 mg) by mouth 2 times daily 10/8/2017 at Unknown time Yes Allan Casey MD   aspirin EC 81 MG EC tablet Take 1 tablet (81 mg) by mouth daily  Patient taking differently: Take 81 mg by mouth every morning  10/8/2017 at Unknown time Yes Daria Mancilla MD   blood glucose monitoring (ONE TOUCH ULTRA 2) meter device kit Use to test blood sugars 3 times daily or as directed. 10/8/2017 at Unknown time Yes Allan Casey MD   phenytoin 200 MG CAPS Take 200 mg by mouth 2 times daily  Patient taking differently: Take 200 mg by mouth 2 times daily (takes at 8 AM and 8 PM) 10/8/2017 at Unknown time Yes Noah Blum MD   dorzolamide (TRUSOPT) 2 % ophthalmic solution Place 1 drop into both eyes 2 times daily  10/8/2017 at Unknown time Yes Reported, Patient   zinc 50 MG TABS Take 1 tablet by mouth daily 10/8/2017 Yes Reported, Patient   latanoprost (XALATAN) 0.005 % ophthalmic solution Place 1 drop into both eyes At Bedtime 10/7/2017  "Yes Kendrick Wells MD   atorvastatin (LIPITOR) 40 MG tablet Take 1 tablet (40 mg) by mouth daily  Patient taking differently: Take 40 mg by mouth At Bedtime  10/7/2017 at Unknown time Yes Allan Casey MD   prochlorperazine (COMPAZINE) 10 MG tablet Take 1 tablet (10 mg) by mouth every 8 hours as needed 10/8/2017 at Unknown time Yes Allan Casey MD   Cholecalciferol (VITAMIN D) 2000 UNITS tablet Take 2,000 Units by mouth daily. 10/8/2017 at Unknown time Yes Reported, Patient   Multiple Vitamin (MULTIVITAMIN OR) Take 1 tablet by mouth daily  10/8/2017 at Unknown time Yes Reported, Patient   estradiol (ESTRACE VAGINAL) 0.1 MG/GM cream Use dime size amount 3x a week at night Unknown at Unknown time  Elizabeth Oliver MD   order for DME Full electric hospital bed with half rails    Dx: B49954, I110, J449  Length of need: lifetime   Allan Casey MD   order for DME Wheel Chair Cushion: 18 x 18 inch Roho cushion   Allan Casey MD   order for DME roho w/c cushion 18\" X 18\" for pressure relief   Allan Casey MD   order for DME Hospital bed with side rails   Allan Casey MD   EPINEPHrine (EPIPEN JR) 0.15 MG/0.3ML injection Inject 0.3 mLs (0.15 mg) into the muscle as needed for anaphylaxis Unknown at Unknown time  Allan Casey MD   order for DME Equipment being ordered: wheelchair seat cushion   Allan Casey MD   order for DME Equipment being ordered: CPAP supplies mask, hose, filters, cushion    fax to [facility] at [phone]   Allan Casey MD   order for DME Equipment being ordered: CPAP supplies mask, hose, filters, cushion fax to [facility] at [phone]  Disp: 10 Device Refills: prn   Class: Local Print Start: 2/10/2017   Allan Casey MD   order for DME Equipment being ordered: Nebulizer and tubing supplies   Alina Jennings MD   order for DME ACcu check BID   Allan Casey MD   nitroglycerin (NITROSTAT) 0.4 MG SL tablet Place 1 tablet (0.4 " mg) under the tongue every 5 minutes as needed for chest pain if you are still having symptoms after 3 doses (15 minutes) call 911.   Allan Casey MD   MEDICATION GIVEN BY INTRATHECAL PUMP - INSTRUCTION continuous May 19, 2017 - per [facility] in [place] [phone]:  Conc: Bupivacaine 20 mg/mL and Fentanyl 2000 mcg/mL.  Continuous: Bupivacaine 7.265 mg/day and Fentanyl 726.5 mcg/day.  Boluses: Up to 7 boluses per 24-hr period of Bupivicaine 0.599 mg and Fentanyl 59.9 mcg    Pump Refill Date at [facility]: 7/2/17   Unknown, Entered By History   order for DME Equipment being ordered: Glucerna daily shakes with each meal   Allan Casey MD   ORDER FOR DME Equipment being ordered: Nebulizer and supplies   Alina Jennings MD   ORDER FOR DME Equipment being ordered: Power Wheelchair   Allan Casey MD   ORDER FOR DME Equipment being ordered: Depends briefs   Allan Casey MD   EASY TOUCH LANCETS 30G/TWIST MISC    Reported, Patient         Medication history completed by:      Luís Burris PharmD, [facility]    [phone]  Pager [phone]

## 2017-10-09 NOTE — DISCHARGE INSTRUCTIONS
_______________________  [facility]  Phone  [phone]  Fax  [phone]  ______________________    [facility]  ph: [phone]  fax: [phone]

## 2017-10-09 NOTE — PLAN OF CARE
Problem: Patient Care Overview  Goal: Plan of Care/Patient Progress Review  Outcome: No Change  No complaint of pain and no pain medication given. Complained of nausea and zofran was given and somewhat effective. Slept with CPAP on and appeared comfortable. Incontinent of urine. Continue with current plan of nursing care, and update MD with concerns as needed.

## 2017-10-10 ENCOUNTER — APPOINTMENT (OUTPATIENT)
Dept: PHYSICAL THERAPY | Facility: CLINIC | Age: 67
DRG: 872 | End: 2017-10-10
Attending: PEDIATRICS
Payer: COMMERCIAL

## 2017-10-10 LAB
ANION GAP SERPL CALCULATED.3IONS-SCNC: 8 MMOL/L (ref 3–14)
BASOPHILS # BLD AUTO: 0 10E9/L (ref 0–0.2)
BASOPHILS NFR BLD AUTO: 0.4 %
BUN SERPL-MCNC: 6 MG/DL (ref 7–30)
CALCIUM SERPL-MCNC: 8.9 MG/DL (ref 8.5–10.1)
CHLORIDE SERPL-SCNC: 108 MMOL/L (ref 94–109)
CO2 SERPL-SCNC: 24 MMOL/L (ref 20–32)
CREAT SERPL-MCNC: 0.52 MG/DL (ref 0.52–1.04)
DIFFERENTIAL METHOD BLD: ABNORMAL
EOSINOPHIL # BLD AUTO: 0.2 10E9/L (ref 0–0.7)
EOSINOPHIL NFR BLD AUTO: 2.4 %
ERYTHROCYTE [DISTWIDTH] IN BLOOD BY AUTOMATED COUNT: 14.3 % (ref 10–15)
GFR SERPL CREATININE-BSD FRML MDRD: >90 ML/MIN/1.7M2
GLUCOSE BLDC GLUCOMTR-MCNC: 90 MG/DL (ref 70–99)
GLUCOSE BLDC GLUCOMTR-MCNC: 96 MG/DL (ref 70–99)
GLUCOSE SERPL-MCNC: 98 MG/DL (ref 70–99)
HCT VFR BLD AUTO: 34.5 % (ref 35–47)
HGB BLD-MCNC: 11.3 G/DL (ref 11.7–15.7)
IMM GRANULOCYTES # BLD: 0 10E9/L (ref 0–0.4)
IMM GRANULOCYTES NFR BLD: 0.4 %
LYMPHOCYTES # BLD AUTO: 1.4 10E9/L (ref 0.8–5.3)
LYMPHOCYTES NFR BLD AUTO: 19.3 %
MCH RBC QN AUTO: 28.3 PG (ref 26.5–33)
MCHC RBC AUTO-ENTMCNC: 32.8 G/DL (ref 31.5–36.5)
MCV RBC AUTO: 87 FL (ref 78–100)
MONOCYTES # BLD AUTO: 1.5 10E9/L (ref 0–1.3)
MONOCYTES NFR BLD AUTO: 20.1 %
NEUTROPHILS # BLD AUTO: 4.3 10E9/L (ref 1.6–8.3)
NEUTROPHILS NFR BLD AUTO: 57.4 %
NRBC # BLD AUTO: 0 10*3/UL
NRBC BLD AUTO-RTO: 0 /100
PLATELET # BLD AUTO: 239 10E9/L (ref 150–450)
PLATELET # BLD EST: ABNORMAL 10*3/UL
POTASSIUM SERPL-SCNC: 3.7 MMOL/L (ref 3.4–5.3)
RBC # BLD AUTO: 3.99 10E12/L (ref 3.8–5.2)
SODIUM SERPL-SCNC: 140 MMOL/L (ref 133–144)
WBC # BLD AUTO: 7.5 10E9/L (ref 4–11)

## 2017-10-10 PROCEDURE — 85025 COMPLETE CBC W/AUTO DIFF WBC: CPT | Performed by: HOSPITALIST

## 2017-10-10 PROCEDURE — 12000001 ZZH R&B MED SURG/OB UMMC

## 2017-10-10 PROCEDURE — 97161 PT EVAL LOW COMPLEX 20 MIN: CPT | Mod: GP

## 2017-10-10 PROCEDURE — 97110 THERAPEUTIC EXERCISES: CPT | Mod: GP

## 2017-10-10 PROCEDURE — 25000128 H RX IP 250 OP 636: Performed by: HOSPITALIST

## 2017-10-10 PROCEDURE — 80048 BASIC METABOLIC PNL TOTAL CA: CPT | Performed by: HOSPITALIST

## 2017-10-10 PROCEDURE — 00000146 ZZHCL STATISTIC GLUCOSE BY METER IP

## 2017-10-10 PROCEDURE — 94660 CPAP INITIATION&MGMT: CPT

## 2017-10-10 PROCEDURE — 25000132 ZZH RX MED GY IP 250 OP 250 PS 637: Performed by: PEDIATRICS

## 2017-10-10 PROCEDURE — 40000275 ZZH STATISTIC RCP TIME EA 10 MIN

## 2017-10-10 PROCEDURE — 40000193 ZZH STATISTIC PT WARD VISIT

## 2017-10-10 PROCEDURE — 25800025 ZZH RX 258: Performed by: INTERNAL MEDICINE

## 2017-10-10 PROCEDURE — 97530 THERAPEUTIC ACTIVITIES: CPT | Mod: GP

## 2017-10-10 PROCEDURE — 99233 SBSQ HOSP IP/OBS HIGH 50: CPT | Mod: GC | Performed by: INTERNAL MEDICINE

## 2017-10-10 PROCEDURE — 25000128 H RX IP 250 OP 636: Performed by: PEDIATRICS

## 2017-10-10 PROCEDURE — 36415 COLL VENOUS BLD VENIPUNCTURE: CPT | Performed by: HOSPITALIST

## 2017-10-10 RX ADMIN — Medication 81 MG: at 07:59

## 2017-10-10 RX ADMIN — LEVETIRACETAM 500 MG: 500 TABLET ORAL at 19:33

## 2017-10-10 RX ADMIN — PANTOPRAZOLE SODIUM 40 MG: 40 TABLET, DELAYED RELEASE ORAL at 07:59

## 2017-10-10 RX ADMIN — POTASSIUM CHLORIDE, DEXTROSE MONOHYDRATE AND SODIUM CHLORIDE: 75; 5; 450 INJECTION, SOLUTION INTRAVENOUS at 10:07

## 2017-10-10 RX ADMIN — PROCHLORPERAZINE EDISYLATE 5 MG: 5 INJECTION INTRAMUSCULAR; INTRAVENOUS at 20:21

## 2017-10-10 RX ADMIN — RISPERIDONE 0.5 MG: 0.5 TABLET ORAL at 21:39

## 2017-10-10 RX ADMIN — POTASSIUM CHLORIDE, DEXTROSE MONOHYDRATE AND SODIUM CHLORIDE: 75; 5; 450 INJECTION, SOLUTION INTRAVENOUS at 00:26

## 2017-10-10 RX ADMIN — ESCITALOPRAM OXALATE 20 MG: 20 TABLET ORAL at 07:59

## 2017-10-10 RX ADMIN — PROCHLORPERAZINE EDISYLATE 5 MG: 5 INJECTION INTRAMUSCULAR; INTRAVENOUS at 14:19

## 2017-10-10 RX ADMIN — ONDANSETRON 4 MG: 2 INJECTION INTRAMUSCULAR; INTRAVENOUS at 17:11

## 2017-10-10 RX ADMIN — ZINC SULFATE CAP 220 MG (50 MG ELEMENTAL ZN) 220 MG: 220 (50 ZN) CAP at 07:58

## 2017-10-10 RX ADMIN — Medication 2 TABLET: at 07:58

## 2017-10-10 RX ADMIN — ESTRADIOL 1 MG: 1 TABLET ORAL at 07:59

## 2017-10-10 RX ADMIN — PREGABALIN 150 MG: 75 CAPSULE ORAL at 19:33

## 2017-10-10 RX ADMIN — ENOXAPARIN SODIUM 40 MG: 40 INJECTION SUBCUTANEOUS at 21:35

## 2017-10-10 RX ADMIN — PHENYTOIN SODIUM 200 MG: 100 CAPSULE, EXTENDED RELEASE ORAL at 07:59

## 2017-10-10 RX ADMIN — ACETAMINOPHEN 650 MG: 325 TABLET, FILM COATED ORAL at 12:57

## 2017-10-10 RX ADMIN — FLUTICASONE FUROATE AND VILANTEROL TRIFENATATE 1 PUFF: 100; 25 POWDER RESPIRATORY (INHALATION) at 07:58

## 2017-10-10 RX ADMIN — SUCRALFATE 1 G: 1 TABLET ORAL at 21:35

## 2017-10-10 RX ADMIN — DOCUSATE SODIUM 100 MG: 100 CAPSULE, LIQUID FILLED ORAL at 07:59

## 2017-10-10 RX ADMIN — DOCUSATE SODIUM 100 MG: 100 CAPSULE, LIQUID FILLED ORAL at 19:33

## 2017-10-10 RX ADMIN — PREGABALIN 150 MG: 75 CAPSULE ORAL at 07:59

## 2017-10-10 RX ADMIN — ATORVASTATIN CALCIUM 40 MG: 40 TABLET, FILM COATED ORAL at 21:35

## 2017-10-10 RX ADMIN — SUCRALFATE 1 G: 1 TABLET ORAL at 15:47

## 2017-10-10 RX ADMIN — UMECLIDINIUM 1 PUFF: 62.5 AEROSOL, POWDER ORAL at 07:59

## 2017-10-10 RX ADMIN — POLYETHYLENE GLYCOL 3350 17 G: 17 POWDER, FOR SOLUTION ORAL at 07:58

## 2017-10-10 RX ADMIN — LEVOFLOXACIN 750 MG: 5 INJECTION, SOLUTION INTRAVENOUS at 19:33

## 2017-10-10 RX ADMIN — VITAMIN D, TAB 1000IU (100/BT) 2000 UNITS: 25 TAB at 07:59

## 2017-10-10 RX ADMIN — PHENYTOIN SODIUM 200 MG: 100 CAPSULE, EXTENDED RELEASE ORAL at 19:33

## 2017-10-10 RX ADMIN — DORZOLAMIDE HYDROCHLORIDE 1 DROP: 20 SOLUTION/ DROPS OPHTHALMIC at 07:58

## 2017-10-10 RX ADMIN — POTASSIUM CHLORIDE, DEXTROSE MONOHYDRATE AND SODIUM CHLORIDE: 75; 5; 450 INJECTION, SOLUTION INTRAVENOUS at 21:35

## 2017-10-10 RX ADMIN — LEVETIRACETAM 500 MG: 500 TABLET ORAL at 07:59

## 2017-10-10 RX ADMIN — DORZOLAMIDE HYDROCHLORIDE 1 DROP: 20 SOLUTION/ DROPS OPHTHALMIC at 19:33

## 2017-10-10 RX ADMIN — FLUTICASONE PROPIONATE 1 SPRAY: 50 SPRAY, METERED NASAL at 07:59

## 2017-10-10 RX ADMIN — TRAZODONE HYDROCHLORIDE 100 MG: 100 TABLET ORAL at 21:35

## 2017-10-10 RX ADMIN — SUCRALFATE 1 G: 1 TABLET ORAL at 07:59

## 2017-10-10 RX ADMIN — ONDANSETRON 4 MG: 2 INJECTION INTRAMUSCULAR; INTRAVENOUS at 11:15

## 2017-10-10 RX ADMIN — LATANOPROST 1 DROP: 50 SOLUTION/ DROPS OPHTHALMIC at 21:36

## 2017-10-10 NOTE — PLAN OF CARE
Problem: Urinary Tract Infection (Adult)  Goal: Signs and Symptoms of Listed Potential Problems Will be Absent, Minimized or Managed (Urinary Tract Infection)  Signs and symptoms of listed potential problems will be absent, minimized or managed by discharge/transition of care (reference Urinary Tract Infection (Adult) CPG).   Outcome: No Change  AVSS. Denies pain or nausea. Voided in bedpan x 2 and incontinent of urine x 1. Slept fair.

## 2017-10-10 NOTE — PROGRESS NOTES
10/10/17 2411   Quick Adds   Type of Visit Initial PT Evaluation   Living Environment   Lives With facility resident   Living Arrangements assisted living   Home Accessibility no concerns   Number of Stairs to Enter Home 0   Number of Stairs Within Home 0   Stair Railings at Home none   Transportation Available agency transportation;public transportation   Living Environment Comment Pt lives in assisted living facility, currently receives assistance with cleaning, laundry and bathing cares, although states more assistance is available.    Self-Care   Dominant Hand right   Usual Activity Tolerance moderate   Current Activity Tolerance poor   Regular Exercise yes   Activity/Exercise Type other (see comments)  (UE strengthening with OT)   Exercise Amount/Frequency 2 times/wk   Equipment Currently Used at Home wheelchair, power;tub bench;transfer board;grab bar   Activity/Exercise/Self-Care Comment Pt reports using power chair for all mobility. States she was performing upper body exercises 2x/week with OT.   Functional Level Prior   Ambulation 4-->completely dependent   Transferring 0-->independent   Toileting 0-->independent   Bathing 2-->assistive person   Dressing 0-->independent   Eating 0-->independent   Communication 0-->understands/communicates without difficulty   Swallowing 2-->difficulty swallowing liquids/foods   Cognition 0 - no cognition issues reported   Fall history within last six months yes   Number of times patient has fallen within last six months 3   Which of the above functional risks had a recent onset or change? swallowing;transferring   Prior Functional Level Comment Pt was using power chair for all mobility; performed transfers without use of a slide board but does endorse occasionally using one in the past. Receives assistance for bathing while at [facility]. Reports history of falls during transfers in which her chair would slide out.   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    Precautions/Limitations fall precautions   Weight-Bearing Status - LUE full weight-bearing   Weight-Bearing Status - RUE full weight-bearing   General Observations legally blind   General Info Comments Activity: up in chair   Cognitive Status Examination   Orientation orientation to person, place and time   Level of Consciousness alert   Follows Commands and Answers Questions 100% of the time   Personal Safety and Judgment intact   Memory intact   Pain Assessment   Patient Currently in Pain Yes, see Vital Sign flowsheet   Integumentary/Edema   Integumentary/Edema Comments bilateral AKA   Posture    Posture Forward head position   Range of Motion (ROM)   ROM Comment wfl per observation BUE   Strength   Strength Comments >3+/5 per observation of UE movement; pt demonstrates 3+/5 hip flexor strength bilaterally   Bed Mobility   Bed Mobility Comments SBA rolling left/right; CGA - Min A supine <> sit   Transfer Skills   Transfer Comments not attempted due to pt's fatigue at this time   Gait   Gait Comments not assessed due to history of AKA bilaterally   Balance   Balance Comments requires BUE support in sitting   Sensory Examination   Sensory Perception Comments denies change; legally blind   General Therapy Interventions   Planned Therapy Interventions balance training;bed mobility training;strengthening;wheelchair management/propulsion training;transfer training;home program guidelines   Clinical  "Impression   Criteria for Skilled Therapeutic Intervention yes, treatment indicated   PT Diagnosis Impaired functional mobility   Influenced by the following impairments decreased strength and activity tolerance, history of AKA    Functional limitations due to impairments reduced transfers from baseline   Clinical Presentation Stable/Uncomplicated   Clinical Presentation Rationale pt presentation, clinical reasoning   Clinical Decision Making (Complexity) Low complexity   Therapy Frequency` 5 times/week   Predicted Duration of Therapy Intervention (days/wks) 1 week   Anticipated Equipment Needs at Discharge (none - back to [facility])   Anticipated Discharge Disposition Other (see comments)  ([facility] with PT/OT services)   Risk & Benefits of therapy have been explained Yes   Patient, Family & other staff in agreement with plan of care Yes   Clinical Impression Comments evaluation completed, treatment initiated   [facility] TM \"6 Clicks\"   2016, Trustees of [facility], under license to [employer].  All rights reserved.   6 Clicks Short Forms Basic Mobility Inpatient Short Form   [facility] AM-PAC  \"6 Clicks\" V.2 Basic Mobility Inpatient Short Form   1. Turning from your back to your side while in a flat bed without using bedrails? 4 - None   2. Moving from lying on your back to sitting on the side of a flat bed without using bedrails? 3 - A Little   3. Moving to and from a bed to a chair (including a wheelchair)? 2 - A Lot   4. Standing up from a chair using your arms (e.g., wheelchair, or bedside chair)? 1 - Total   5. To walk in hospital room? 1 - Total   6. Climbing 3-5 steps with a railing? 1 - Total   Basic Mobility Raw Score (Score out of 24.Lower scores equate to lower levels of function) 12   Total Evaluation Time   Total Evaluation Time (Minutes) 10     "

## 2017-10-10 NOTE — PLAN OF CARE
Problem: Patient Care Overview  Goal: Plan of Care/Patient Progress Review  PT 5B: evaluation completed, treatment initiated  Discharge Planner PT   Patient plan for discharge: Assisted Living Facility with PT/OT services  Current status: pt rolls independently; declines sitting due to fatigue this day; participates in UE strengthening exercises with theraband.  Barriers to return to prior living situation: decreased functional strength and activity tolerance  Recommendations for discharge: back to [facility] with resumption of cares, PT and OT services  Rationale for recommendations: pt below baseline strength and mobility.       Entered by: Kendrick Padgett 10/10/2017 9:15 AM

## 2017-10-10 NOTE — PROGRESS NOTES
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      #Sepsis 2/2 UTI  #Recurrent UTIs  Dirty UA in the setting of recurrent UTIs secondary to incontinence and multiple prior pelvic surgeries. Met SIRS criteria on admission with leukocytosis to 16.9 and tachycardia, o/w HDS and lactate wnl. Followed by urology as outpatient and long term plan is for suprapubic cather once able to come off anticoagulation which just happened last week. CT negative for stones or hydronephrosis.  - BCx ngtd  - UCx >100k Klebsella pneumonia, awaiting senstiivities  - Outpatient f/u with urology to discuss suprapubic catheter placement  - Continue levofloxacin x5d course (last dose 10/12)  - mIVF given N/V and poor po intake     #Polypharmacy  Patient with a very large medication list.  - Pharmacy med rec consult placed, appreciate assistance  - Followed by [facility] Pharmacist, see note 7/31/17 for further details     #CAD  #Recent NSTEMI 9/2016 s/p stenting, completed 1 year of plavix   #HFpEF   #HTN  Stress test 10/2017 showing inferior wall MI without significant ischemia, plan per cards was to stop plavix as of 10/2/17. No active chest pain at this time.   - Continue ASA  - Holding antihypertensives given sepsis (metoprolol, lisinopril, HCTZ) - can restart at d/c as pressures back to baseline  - No further plavix needed per cards note 10/2/17  - Continue atorvastatin     #Transgender  #BPH  Of note, patient does have a prostate.   - Holding tamsulosin given concern for sepsis  - Continue estrogen hormone replacement therapy     #PAD s/p bilateral lower extremity amputations  Multiple  previous surgeries. Bilateral femoral stenting in the past.   - Continue ASA  - OK to d/c plavix as above; discussed with vascular surgery and no need for plavix this far out from stenting     #Chronic pain  Followed in pain clinic has pain pump which delivers continuous fentanyl as well as a max of 7 bumps daily. Patient did not bring her button to give herself bumps  - Fentanyl pain pump in place to provide continuous doses  - Fentanyl 25 mcg q3h prn for breakthrough (has PCA at home but she didn't bring it, this seems to be managing her pain sufficiently thus far)  - Tylenol prn   - Obtain records from pain clinic to ensure we are in keeping with her typical pain regimen  - Continue pta lyrica, d/c gabapentin (redundant on MAR)     #Dysphagia 2/2 esophageal strictures s/p previous dilatations  #Chronic erosive GERD  Most recent dilation in 9/2016 at [facility]. Patient complaining of difficulty swallowing.   - Pantoprazole 40mg qday  - Carafate 1g 4 times daily prn  - SLP recently saw as outpatient and follows with her, no need for inpatient consult at this time, continue mechanical/soft diet  - Zofran and compazine for prn vomiting, iv when not tolerating po d/t vomiting     #Allergic rhinitis  #COPD  Last PFTs 3/2015 with FEV1 95%, DLCO 64%.  - Zyrtec 10mg qday prn  - Flonase 1 spray bilateral nares qday  - Albuterol 2.5mg 0.083% q6h prn  - Advair 250-50 1 puff 2 times daily  - d/c Spiriva 18 mcg qday - also on Incruse (Umeclidinium per MAR) and will continue that     #Seizure disorder  - Continue Keppra 500mg bid and Phenytoin 200mg bid     #Glaucoma  - Continue dorzolamide and lantanoprost     #Restless leg syndrome  - Continue lyrica 150mg bid  - D/c gabapentin and ropinerole (recieved doses tonight, but was stopped previous per 7/2017 [facility] note)     #Depression  #Anxiety  #Schizoaffective disorder  #Insomnia  In grief counseling for the loss of her significant other, has made passive thoughts about  letting herself die naturally by stopping her medications. Denies any active SI or plans.   - Trazodone prn for sleep  - Escitalopram 10mg qday  - Risperidone 0.5mg qhs     #JOSE  - Continue home CPAP     #History of chronic anemia  Hgb within normal limits currently   - Holding home vitamin b12 and iron     FEN: mIVF with D5-1/2NS-10KCl when not tolerating po  Lines: PIV  DVT: lovenox  Code status: DNR/DNI    Disposition Plan   Expected discharge: Tomorrow, recommended to prior living arrangement once adequate pain management/ tolerating PO medications.     Entered: Chi Ramirez 10/10/2017, 6:57 AM   Information in the above section will display in the discharge planner report.      The patient's care was discussed with the Bedside Nurse and Patient.    Chi Ramirez MD PGY2  Internal Medicine  Pager [phone]  Eve 5  Please see sticky note for cross cover information    Interval History   NAEO. Breathing stable with bipap at night, RA when awake. Improved abdominal pain. Still with nausea but antiemetics helping. Unable to hold food down yesterday. Denies SOB or CP, no F/C.     Physical Exam   /68 (BP Location: Left arm)  Pulse 85  Temp 96  F (35.6  C) (Oral)  Resp 16  Wt 72.7 kg (160 lb 3.2 oz)  SpO2 94%  BMI 25.09 kg/m2  Weight: 160 lbs 3.2 oz  Gen: Comfortable, resting in bed, NAD  HEENT: NCAT, EOMI  Neck: Supple  Pulm: CTAB without wheezes or crackles  CV: RRR, S1/S2, no m/r/g  ABD: Soft, mild suprapubic ttp, improved, normal BS  EXT: b/l AKA  NEURO: AOx3, nonfocal  Psych: Appropriate mood and affect    Data     Recent Labs  Lab 10/10/17  0635 10/08/17  1744   WBC 7.5 16.9*   HGB 11.3* 12.3   MCV 87 89    268    138   POTASSIUM 3.7 3.8   CHLORIDE 108 103   CO2 24 28   BUN 6* 12   CR 0.52 0.63   ANIONGAP 8 8   CAROL 8.9 9.0   GLC 98 85   ALBUMIN  --  2.9*   PROTTOTAL  --  8.9*   BILITOTAL  --  0.3   ALKPHOS  --  225*   ALT  --  45   AST  --  48*   LIPASE  --  156   TROPI  --   <0.015     Internal Medicine Staff Addendum  Date of Service: 10/10/2017  I have seen and examined Ms. Foote, reviewed the data and discussed the plan of care with the patient and the care team on P&FC Rounds.  I agree with the above documentation     I discussed pt's care with bedside RN, case management/social work today.  I personally reviewed labs, medications and past 24 hr notes.  Assessment/Plan/Diagnoses: plan/dx as above, which contains my edits and reflects our joint medical decision-making.     Michelet Jenkins MD  Internal Medicine/Pediatrics Hospitalist & Staff Physician   of Internal Medicine and Pediatrics  [facility]  Pager: [phone]

## 2017-10-10 NOTE — PLAN OF CARE
Problem: Patient Care Overview  Goal: Plan of Care/Patient Progress Review  Outcome: No Change  Vital sign stable on RA. Bilateral lower extremity amputation. D51/2NS+10KCl at 100ml/hr. PRN zofran and compazine given for nausea. Incontinent of urine. No BM this shift. Will continue to monitor.

## 2017-10-10 NOTE — PLAN OF CARE
Problem: Patient Care Overview  Goal: Plan of Care/Patient Progress Review  Outcome: Therapy, progress toward functional goals as expected  Starting caring for pt at 1900. VSS. Incontinent of urine x 2 and used bedpan successfully x 1. Helped to reposition in bed. Took pills with applesauce. IVF and IV antibiotic per order. Denied pain. No c/o nausea or vomiting since taking over care of pt. Currently resting comfortably with CPAP on for nocs. Will continue to monitor.

## 2017-10-11 VITALS
SYSTOLIC BLOOD PRESSURE: 155 MMHG | BODY MASS INDEX: 25.09 KG/M2 | TEMPERATURE: 96.8 F | HEART RATE: 83 BPM | OXYGEN SATURATION: 97 % | DIASTOLIC BLOOD PRESSURE: 79 MMHG | WEIGHT: 160.2 LBS | RESPIRATION RATE: 16 BRPM

## 2017-10-11 LAB
ANION GAP SERPL CALCULATED.3IONS-SCNC: 7 MMOL/L (ref 3–14)
BACTERIA SPEC CULT: ABNORMAL
BUN SERPL-MCNC: 6 MG/DL (ref 7–30)
CALCIUM SERPL-MCNC: 8.9 MG/DL (ref 8.5–10.1)
CHLORIDE SERPL-SCNC: 108 MMOL/L (ref 94–109)
CO2 SERPL-SCNC: 25 MMOL/L (ref 20–32)
CREAT SERPL-MCNC: 0.51 MG/DL (ref 0.52–1.04)
ERYTHROCYTE [DISTWIDTH] IN BLOOD BY AUTOMATED COUNT: 14 % (ref 10–15)
GFR SERPL CREATININE-BSD FRML MDRD: >90 ML/MIN/1.7M2
GLUCOSE SERPL-MCNC: 90 MG/DL (ref 70–99)
HCT VFR BLD AUTO: 33.4 % (ref 35–47)
HGB BLD-MCNC: 10.9 G/DL (ref 11.7–15.7)
Lab: ABNORMAL
MCH RBC QN AUTO: 28.1 PG (ref 26.5–33)
MCHC RBC AUTO-ENTMCNC: 32.6 G/DL (ref 31.5–36.5)
MCV RBC AUTO: 86 FL (ref 78–100)
PLATELET # BLD AUTO: 270 10E9/L (ref 150–450)
POTASSIUM SERPL-SCNC: 3.6 MMOL/L (ref 3.4–5.3)
RBC # BLD AUTO: 3.88 10E12/L (ref 3.8–5.2)
SODIUM SERPL-SCNC: 140 MMOL/L (ref 133–144)
SPECIMEN SOURCE: ABNORMAL
WBC # BLD AUTO: 8.3 10E9/L (ref 4–11)

## 2017-10-11 PROCEDURE — 80048 BASIC METABOLIC PNL TOTAL CA: CPT | Performed by: HOSPITALIST

## 2017-10-11 PROCEDURE — 94660 CPAP INITIATION&MGMT: CPT

## 2017-10-11 PROCEDURE — 25000132 ZZH RX MED GY IP 250 OP 250 PS 637: Performed by: HOSPITALIST

## 2017-10-11 PROCEDURE — 40000809 ZZH STATISTIC NO DOCUMENTATION TO SUPPORT CHARGE

## 2017-10-11 PROCEDURE — 85027 COMPLETE CBC AUTOMATED: CPT | Performed by: HOSPITALIST

## 2017-10-11 PROCEDURE — 25000128 H RX IP 250 OP 636: Performed by: PEDIATRICS

## 2017-10-11 PROCEDURE — 25000132 ZZH RX MED GY IP 250 OP 250 PS 637: Performed by: PEDIATRICS

## 2017-10-11 PROCEDURE — 36415 COLL VENOUS BLD VENIPUNCTURE: CPT | Performed by: HOSPITALIST

## 2017-10-11 RX ORDER — METOPROLOL SUCCINATE 25 MG/1
25 TABLET, EXTENDED RELEASE ORAL DAILY
Status: DISCONTINUED | OUTPATIENT
Start: 2017-10-11 | End: 2017-10-11 | Stop reason: HOSPADM

## 2017-10-11 RX ORDER — LEVOFLOXACIN 750 MG/1
750 TABLET, FILM COATED ORAL DAILY
Qty: 2 TABLET | Refills: 0 | Status: SHIPPED | OUTPATIENT
Start: 2017-10-11 | End: 2017-10-18

## 2017-10-11 RX ORDER — LISINOPRIL 10 MG/1
10 TABLET ORAL DAILY
Status: DISCONTINUED | OUTPATIENT
Start: 2017-10-11 | End: 2017-10-11 | Stop reason: HOSPADM

## 2017-10-11 RX ADMIN — FLUTICASONE PROPIONATE 1 SPRAY: 50 SPRAY, METERED NASAL at 08:31

## 2017-10-11 RX ADMIN — Medication 81 MG: at 08:31

## 2017-10-11 RX ADMIN — DOCUSATE SODIUM 100 MG: 100 CAPSULE, LIQUID FILLED ORAL at 08:31

## 2017-10-11 RX ADMIN — METOPROLOL SUCCINATE 25 MG: 25 TABLET, EXTENDED RELEASE ORAL at 08:32

## 2017-10-11 RX ADMIN — PHENYTOIN SODIUM 200 MG: 100 CAPSULE, EXTENDED RELEASE ORAL at 08:31

## 2017-10-11 RX ADMIN — FLUTICASONE FUROATE AND VILANTEROL TRIFENATATE 1 PUFF: 100; 25 POWDER RESPIRATORY (INHALATION) at 08:32

## 2017-10-11 RX ADMIN — PREGABALIN 150 MG: 75 CAPSULE ORAL at 08:32

## 2017-10-11 RX ADMIN — DORZOLAMIDE HYDROCHLORIDE 1 DROP: 20 SOLUTION/ DROPS OPHTHALMIC at 08:32

## 2017-10-11 RX ADMIN — ESTRADIOL 1 MG: 1 TABLET ORAL at 08:31

## 2017-10-11 RX ADMIN — LISINOPRIL 10 MG: 10 TABLET ORAL at 08:31

## 2017-10-11 RX ADMIN — FENTANYL CITRATE 25 MCG: 50 INJECTION, SOLUTION INTRAMUSCULAR; INTRAVENOUS at 08:27

## 2017-10-11 RX ADMIN — UMECLIDINIUM 1 PUFF: 62.5 AEROSOL, POWDER ORAL at 08:31

## 2017-10-11 RX ADMIN — ZINC SULFATE CAP 220 MG (50 MG ELEMENTAL ZN) 220 MG: 220 (50 ZN) CAP at 08:32

## 2017-10-11 RX ADMIN — ESCITALOPRAM OXALATE 20 MG: 20 TABLET ORAL at 08:31

## 2017-10-11 RX ADMIN — LEVETIRACETAM 500 MG: 500 TABLET ORAL at 08:32

## 2017-10-11 RX ADMIN — VITAMIN D, TAB 1000IU (100/BT) 2000 UNITS: 25 TAB at 08:31

## 2017-10-11 RX ADMIN — SUCRALFATE 1 G: 1 TABLET ORAL at 08:31

## 2017-10-11 RX ADMIN — PANTOPRAZOLE SODIUM 40 MG: 40 TABLET, DELAYED RELEASE ORAL at 08:32

## 2017-10-11 RX ADMIN — Medication 2 TABLET: at 08:31

## 2017-10-11 NOTE — PLAN OF CARE
Problem: Patient Care Overview  Goal: Plan of Care/Patient Progress Review  Outcome: No Change  AOx4.  VSS on CPAP over night.  Bilateral AKA.  Pt moves well in bed- assist X1 to change.  Voiding on bedpan.  No BM over night.  IVMF infusing per order.  Repositioned throughout shift. C/o back pain partially managed with repositioning with pillow.  No reports of nausea.  Pt able to make needs known.  Continue per POC.

## 2017-10-11 NOTE — PLAN OF CARE
Problem: Patient Care Overview  Goal: Plan of Care/Patient Progress Review  Outcome: No Change  Pt A/O. Voiding adequate amounts on bedpan, incontinent of urine at times. No BM this shift. IVF and antibiotic per order. Continues to struggle with nausea, poor appetite this shift. Had cottage cheese and juice for dinner. Zofran and compazine given x 1 with some relief. Emesis x 1. VSS. Will continue to monitor.

## 2017-10-11 NOTE — DISCHARGE SUMMARY
Medicine Discharge Summary  Sonya Foote MRN: [id number]  1950  Primary care provider: Allan Casey  ___________________________________          Date of Admission:  10/8/2017  Date of Discharge:  10/11/2017  Admitting Physician:  Matt Ruth MD  Discharge Physician:  Michelet Jenkins MD  Discharging Service:  Internal Medicine, [place] [address]     Primary Provider: Allan Casey         Reason for Admission:   Sepsis 2/2 UTI  H/o chronic UTIs  Chronic pain  Nausea/vomiting          Discharge Diagnosis:   UTI 2/2 Klebsiella pneumoniae  Chronic pain         Procedures & Significant Findings:   Urine cx 10/8 >100k Klebsiella pneumoniae  Sensitive to: levofloxacin among others         Consultations:   PT         Hospital Course by Problem:    This is a 67 year old transgender female s/p sexual reassignment surgery in 1974 (male to female), type 2 DM, PAD s/p bilateral lower extremity amputations, CAD, HTN, 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     #Sepsis 2/2 UTI  #Recurrent UTIs  Dirty UA in the setting of recurrent UTIs secondary to incontinence and multiple prior pelvic surgeries. Met SIRS criteria on admission with leukocytosis to 16.9 and tachycardia, o/w HDS and lactate wnl. Followed by urology as outpatient and long term plan is for suprapubic cather once able to come off anticoagulation which just happened last week (1 year post-coronary stent, see below). CT negative for stones or hydronephrosis. BCx from 10/8 ngtd, UCx from 10/8 >100k Klebsella pneumoniae. She was started on levofloxacin iv on admission based on prior sensitivities. Continued during stay and discharged on oral levofloxacin to complete 5 day course, last dose 10/12.  - Will need to follow up with Urology to discuss suprapubic catheter placement, appointment already scheduled per  patient.      #CAD  #Recent NSTEMI 9/2016 s/p stenting, completed 1 year of plavix   #HFpEF   #HTN  Stress test 10/2017 showing inferior wall MI without significant ischemia, plan per cards was to stop plavix as of 10/2/17. No active chest pain at this time. Plavix discontinued on admission, also in anticipation of needing suprapubic catheter, as above. ASA continued. Antihypertensives initially held given sepsis (metoprolol, lisinopril, HCTZ).  - Restarted pta metoprolol, lisinopril, and HCTZ on discharge as BP returned to baseline as she began to tolerate oral intake and recovered from infection.    #PAD s/p bilateral lower extremity amputations  Multiple previous surgeries. Bilateral femoral stenting in the past. Continued ASA  - OK to d/c plavix as above; discussed with vascular surgery and no need for plavix this far out from stenting     #Transgender  #BPH  Patient does have a prostate. Tamsulosin held in setting of infection during admission. Estrogen hormone replacement therapy continued while inpatient.      #Chronic pain  Followed in pain clinic has pain pump which delivers continuous fentanyl as well as a max of 7 bumps daily. Patient did not bring her button to give herself bumps. Fentanyl pain pump in place to provide continuous doses. She was given Fentanyl 25 mcg q3h prn for breakthrough (has PCA at home but she didn't bring it, this seems to be managing her pain sufficiently thus far). Continued pta lyrica, d/c'd gabapentin (redundant on MAR)      #Dysphagia 2/2 esophageal strictures s/p previous dilatations  #Chronic erosive GERD  Most recent dilation in 9/2016 at [facility]. Patient complaining of difficulty swallowing. Continued pta protonix and carafate. Zofran and compazine used for nausea/vomiting, IV fluids given when not tolerating po - she was tolerating orals prior to discharge.      #Allergic rhinitis  #COPD  Last PFTs 3/2015 with FEV1 95%, DLCO 64%. Continued home meds, including:  zyrtc, flonase, albuterol, advair.  - d/c'd Spiriva 18 mcg qday as she was also on Incruse (Umeclidinium) per MAR and shouldn't be on both      #Restless leg syndrome  Continued lyrica 150mg bid. D/c'd gabapentin and ropinerole (noted to have been stopped per 7/2017 pharmacy note)      #Depression  #Anxiety  #Schizoaffective disorder  #Insomnia  In grief counseling for the loss of her significant other, has made passive thoughts about letting herself die naturally by stopping her medications. Denies any active SI or plans. Notably changed code status to DNR/DNI this admission. Continued pta meds.  - Consider psychiatry consult vs providing resources for grief counseling/bereavement.    Physical Exam on day of Discharge:  Blood pressure 155/79, pulse 83, temperature 96.8  F (36  C), temperature source Oral, resp. rate 16, weight 72.7 kg (160 lb 3.2 oz), SpO2 97 %, not currently breastfeeding.  Gen: Comfortable, sitting up in bed, NAD  HEENT: NCAT, EOMI  Neck: Supple  Pulm: CTAB without wheezes or crackles  CV: RRR, S1/S2, no m/r/g  ABD: Soft, mild suprapubic ttp, improved, normal BS  EXT: b/l AKA  NEURO: AOx3, nonfocal  Psych: Appropriate mood and affect         Pending Results:   None         Discharge Medications:     Discharge Medication List as of 10/11/2017  2:43 PM      START taking these medications    Details   levofloxacin (LEVAQUIN) 750 MG tablet Take 1 tablet (750 mg) by mouth daily, Disp-2 tablet, R-0, E-Prescribe         CONTINUE these medications which have NOT CHANGED    Details   umeclidinium (INCRUSE ELLIPTA) 62.5 MCG/INH oral inhaler Inhale 1 puff into the lungs daily, Historical      ONDANSETRON PO Take 4 mg by mouth 3 times daily, Historical      cyclobenzaprine (FLEXERIL) 10 MG tablet Take 1 tablet (10 mg) by mouth 2 times daily as needed for muscle spasms, Disp-30 tablet, R-1, E-Prescribe      !! traZODone (DESYREL) 50 MG tablet Historical      !! LYRICA 100 MG capsule PRAEDEP, Historical       lidocaine (LIDODERM) 5 % Patch APPLY 1 TO 3 PATCHES TO PAINFUL AREA AT ONCE FOR UP TO 12 HOURS WITHIN A 24 HOUR PERIOD REMOVE AFTER 12 HOURSDisp-30 patch, [id number]      !! blood glucose monitoring (ONE TOUCH ULTRASOFT) lancets Use to test blood sugar 3 times daily or as directed., Disp-100 each, R-PRN, E-Prescribe      blood glucose monitoring (ONE TOUCH ULTRA) test strip Use to test blood sugars 3 times daily or as directed., Disp-3 Box, R-3, E-Prescribe      metoprolol (TOPROL-XL) 25 MG 24 hr tablet TAKE 1 TABLET (25 MG) BY MOUTH DAILY, Disp-30 tablet, R-10, E-Prescribe      !! traZODone (DESYREL) 100 MG tablet Take 1.5 tablets (150 mg) by mouth At Bedtime, Disp-90 tablet, R-3, No Print Out      polyethylene glycol (MIRALAX/GLYCOLAX) Packet Take 17 g by mouth daily Dissolved in water or juice, Historical      ESCITALOPRAM OXALATE PO Take 10 mg by mouth daily, Historical      ACETAMINOPHEN PO Take 1,000 mg by mouth every 6 hours as needed for fever Taking q4-6 hours, per MAR, Historical      !! pregabalin (LYRICA) 75 MG capsule Take 2 capsules (150 mg) by mouth 2 times daily, Disp-120 capsule, R-5, Local Print      cetirizine (ZYRTEC) 10 MG tablet Take 1 tablet (10 mg) by mouth daily as needed for allergies, Disp-90 tablet, R-3, E-Prescribe      diclofenac (VOLTAREN) 1 % GEL topical gel Apply 2 g topically 4 times daily to handsDisp-100 g, [id number]      lisinopril (PRINIVIL/ZESTRIL) 10 MG tablet Take 1 tablet (10 mg) by mouth daily, Disp-90 tablet, R-3, Local Print      pantoprazole (PROTONIX) 40 MG EC tablet Take 40 mg by mouth daily, Historical      risperiDONE (RISPERDAL) 0.5 MG tablet Take 0.5 mg by mouth At Bedtime, Historical      ferrous sulfate (IRON) 325 (65 FE) MG tablet Take 1 tablet (325 mg) by mouth 2 times daily With meals, Disp-60 tablet, R-2, E-Prescribe      sucralfate (CARAFATE) 1 GM tablet Take 1 tablet (1 g) by mouth 4 times daily May dissolve in 10 mL water is necessary. (Start  upon completion of carafate suspension), Disp-40 tablet, R-5, E-Prescribe      albuterol (2.5 MG/3ML) 0.083% neb solution INHALE 1 VIAL VIA NEBULIZER EVERY 6 HOURS AS NEEDED, Disp-360 mL, R-11, E-Prescribe      CYANOCOBALAMIN PO Take 2,000 mcg by mouth daily, Historical      Nutritional Supplements (RENÉE) PACK Take 1 packet by mouth 2 times daily, Historical      fluticasone (FLONASE) 50 MCG/ACT nasal spray Spray 1 spray into both nostrils daily, Historical      ADVAIR DISKUS 250-50 MCG/DOSE diskus inhaler Inhale 1 puff into the lungs 2 times daily , PRADEEP, Historical      calcium citrate-vitamin D (CITRACAL) 315-250 MG-UNIT TABS Take 2 tablets by mouth daily, Disp-120 tablet, R-5, E-Prescribe      hydrochlorothiazide (MICROZIDE) 12.5 MG capsule Take 1 capsule (12.5 mg) by mouth daily, Disp-90 capsule, R-3, E-Prescribe      estradiol (ESTRACE) 1 MG tablet Take 1 tablet (1 mg) by mouth daily, Disp-90 tablet, R-3, E-Prescribe      levETIRAcetam (KEPPRA) 500 MG tablet Take 1 tablet (500 mg) by mouth 2 times daily, Disp-180 tablet, R-1, E-Prescribe      aspirin EC 81 MG EC tablet Take 1 tablet (81 mg) by mouth daily, Disp-90 tablet, R-3, E-Prescribe      blood glucose monitoring (ONE TOUCH ULTRA 2) meter device kit Use to test blood sugars 3 times daily or as directed.Disp-1 kit, [id number]      phenytoin 200 MG CAPS Take 200 mg by mouth 2 times daily, Disp-60 capsule, R-0, Local Print      dorzolamide (TRUSOPT) 2 % ophthalmic solution Place 1 drop into both eyes 2 times daily , Historical      zinc 50 MG TABS Take 1 tablet by mouth daily, Historical      latanoprost (XALATAN) 0.005 % ophthalmic solution Place 1 drop into both eyes At Bedtime, Disp-2.5 mL, R-11, FaxPlz remind pt to f/u as scheduled, 3/25/16. Thanks!      atorvastatin (LIPITOR) 40 MG tablet Take 1 tablet (40 mg) by mouth daily, Disp-90 tablet, R-3, E-Prescribe      prochlorperazine (COMPAZINE) 10 MG tablet Take 1 tablet (10 mg) by mouth every 8 hours  "as needed, Disp-20 tablet, R-0, E-Prescribe      Cholecalciferol (VITAMIN D) 2000 UNITS tablet Take 2,000 Units by mouth daily., Disp-100 tablet, R-3, Historical      Multiple Vitamin (MULTIVITAMIN OR) Take 1 tablet by mouth daily , Historical      !! order for DME Full electric hospital bed with half rails    Dx: V90118, I110, J449  Length of need: lifetimeDisp-1 Device, R-0, Local Print      !! order for DME Wheel Chair Cushion: 18 x 18 inch Roho cushionDisp-1 Device, R-0, Local Print      !! order for DME roho w/c cushion 18\" X 18\" for pressure reliefDisp-1 Device, R-0, Local Print      !! order for DME Hospital bed with side railsDisp-1 Device, R-0, Local Print      EPINEPHrine (EPIPEN JR) 0.15 MG/0.3ML injection Inject 0.3 mLs (0.15 mg) into the muscle as needed for anaphylaxis, Disp-0.6 mL, R-1, E-Prescribe      !! order for DME Equipment being ordered: wheelchair seat cushionDisp-1 Device, R-0, Local Print      !! order for DME Equipment being ordered: CPAP supplies mask, hose, filters, cushion    fax to [facility] at [phone]-10 Device, R-prn, Local Print      !! order for DME Equipment being ordered: CPAP supplies mask, hose, filters, cushion fax to [facility] at [phone]  Disp: 10 Device Refills: prn   Class: Local Print Start: 2/10/2017Disp-1 Device, R-0, Local Print      !! order for DME Equipment being ordered: Nebulizer and tubing suppliesDisp-1 Units, R-0, Local Print      !! order for DME ACcu check BIDDisp-1 Device, R-0, Local Print      nitroglycerin (NITROSTAT) 0.4 MG SL tablet Place 1 tablet (0.4 mg) under the tongue every 5 minutes as needed for chest pain if you are still having symptoms after 3 doses (15 minutes) call 911., Disp-25 tablet, R-1, Fax      MEDICATION GIVEN BY INTRATHECAL PUMP - INSTRUCTION continuous May 19, 2017 - per [facility] in [place] [phone]:  Conc: Bupivacaine 20 mg/mL and Fentanyl 2000 mcg/mL.  Continuous: Bupivacaine " 7.265 mg/day and Fentanyl 726.5 mcg/day.  Boluses: Up to 7 boluses per 24-hr per iod of Bupivicaine 0.599 mg and Fentanyl 59.9 mcg    Pump Refill Date at [facility]: 7/2/17, Historical      !! order for DME Equipment being ordered: Glucerna daily shakes with each mealDisp-1 Box, R-11, Local Print      !! ORDER FOR DME Equipment being ordered: Nebulizer and suppliesDisp-1 Units, R-0, Local Print      !! ORDER FOR DME Equipment being ordered: Power WheelchairDisp-1 Device, R-0, Local Print      !! ORDER FOR DME Equipment being ordered: Depends briefsDisp-1 Month, R-11, Fax      !! EASY TOUCH LANCETS 30G/TWIST MISC Historical       !! - Potential duplicate medications found. Please discuss with provider.      STOP taking these medications       SPIRIVA HANDIHALER 18 MCG capsule Comments:   Reason for Stopping:                    Discharge Instructions and Follow-Up:     Discharge Procedure Orders  Home Care OT Referral for Hospital Discharge     Home care nursing referral   Referral Type: Home Health Therapies & Aides     MD face to face encounter   Order Comments: Documentation of Face to Face and Certification for Home Health Services    I certify that patient: Sonya Foote is under my care and that I, or a nurse practitioner or physician's assistant working with me, had a face-to-face encounter that meets the physician face-to-face encounter requirements with this patient on: 10/9/2017.    This encounter with the patient was in whole, or in part, for the following medical condition, which is the primary reason for home health care: DM, bilateral Lower extremity amputations, CAD, CVA, COPD, chronic pain.    I certify that, based on my findings, the following services are medically necessary home health services: Occupational Therapy.    My clinical findings support the need for the above services because: Occupational Therapy Services are needed to assess and treat cognitive ability and address ADL safety due to  impairment in IADLs.    Further, I certify that my clinical findings support that this patient is homebound (i.e. absences from home require considerable and taxing effort and are for medical reasons or [employer] services or infrequently or of short duration when for other reasons) because: Requires assistance of another person or specialized equipment to access medical services because patient: Requires supervision of another for safe transfer...    Based on the above findings. I certify that this patient is confined to the home and needs intermittent skilled nursing care, physical therapy and/or speech therapy.  The patient is under my care, and I have initiated the establishment of the plan of care.  This patient will be followed by a physician who will periodically review the plan of care.  Physician/Provider to provide follow up care: Allan Casey    Attending hospital physician (the Medicare certified PEC provider): Michelet Jenkins MD  Physician Signature: See electronic signature associated with these discharge orders.  Date: 10/9/2017     General info for SNF   Order Comments: Length of Stay Estimate: Long Term Care  Condition at Discharge: Stable  Level of care:skilled   Rehabilitation Potential: Fair  Admission H&P remains valid and up-to-date: Yes  Recent Chemotherapy: N/A  Use Nursing Home Standing Orders: Yes     Mantoux instructions   Order Comments: Give two-step Mantoux (PPD) Per Facility Policy Yes     Reason for your hospital stay   Order Comments: You were admitted for a urinary tract infection. You improved with IV antibiotics and completed your course. You had difficulty eating and drinking due to nausea and vomiting during your stay. These improved by the time you were ready to discharge. Make sure to follow up with Urology for placement of a suprapubic catheter to reduce the risk of these recurrent infections.     Activity - Up with nursing assistance   Order Specific Question Answer  Comments   Is discharge order? Yes      Follow Up ([facility])   Order Comments: Follow up with primary care provider, Allan Casey, within 14 days for hospital follow- up.  No follow up labs or test are needed.    Follow up with urology as scheduled    Appointments on [place] and/or [facility] (with [facility] or [facility] provider or service). Call [phone] if you haven't heard regarding these appointments within 7 days of discharge.     Advance Diet as Tolerated   Order Comments: Follow this diet upon discharge: Orders Placed This Encounter     Room Service     Snacks/Supplements Adult: Glucerna (Adult); With Meals     Mechanical/Dental Soft Diet   Order Specific Question Answer Comments   Is discharge order? Yes             Discharge Disposition:   Back to [facility]         Condition on Discharge:   Discharge condition: Stable   Code status on discharge: DNR / DNI      Date of service: 10/11/2017    The patient was discussed with Dr. Jenkins.    Chi Ramirez MD PGY2  Internal Medicine  Pager [phone]  [place] [address]    Internal Medicine Staff Addendum  Date of Service: 10/10/2017  I have seen and examined Ms. Foote, reviewed the data and discussed the plan of care with the patient and the care team on P&FC Rounds.  I agree with the above documentation.  I discussed pt's care with bedside RN, case management/social work today.  I personally reviewed imaging, labs, medications and past 24 hr notes.    40 minutes spent in discharge, including >50% in counseling and coordination of care, medication review and plan of care recommended on follow up. Questions were answered.   Dr. Casey  (PCP) was contacted electronically at the time of discharge, so as to bridge from hospital to outpatient care.   It was our pleasure to care for Ms. Foote during her hospitalization. Please do not hesitate to contact me should there be questions regarding the hospital course or discharge plan.      Michelet Jenkins MD  Internal  Medicine/Pediatrics Hospitalist & Staff Physician   of Internal Medicine and Pediatrics  [facility]  Pager: [phone]

## 2017-10-11 NOTE — PLAN OF CARE
Pt discharged via [facility] transport at 1640. Medications brought to pt from discharge pharmacy by house orderly. All belongings sent with pt.

## 2017-10-11 NOTE — PROGRESS NOTES
Care Coordinator Progress Note     Admission Date/Time:  10/8/2017  Attending MD:  Michelet Jenkins MD     Data  Chart reviewed, discussed with interdisciplinary team.   Patient was admitted for:    Pyelonephritis, acute  Abdominal pain.    Concerns with insurance coverage for discharge needs: None.  Current Living Situation: Patient lives in an assisted living facility.  Support System: Supportive and Involved  Services Involved: DME and Home Care  Transportation: [place] transportation,  [facility] [phone]  Barriers to Discharge: chronically ill and dependent with mobility/activities of daily living    Coordination of Care and Referrals: Provided patient/family with options for Home Care.        Assessment  Spoke with patient at bedside.  Patient lives at [facility] (ph: [phone], fax: [phone]) .  Nursing sets up medications.   through her insurance is Fernando Sharma (phone: [phone]). Patient is currently open with [facility] for OT.  Resumption orders placed.  Patient will need wheelchair accessible van for transportation upon discharge.    Patient does not have her own wheelchair here.  Patient was set up with [facility] w/c transport at 4pm today.    _______________________  [facility]  Phone  [phone]  Fax  [phone]  ______________________       Plan  Anticipated Discharge Date:  today  Anticipated Discharge Plan:  Back to [facility] with [facility]    Vida Wheeler RN, BSN  Care Coordinator [facility] [address]  Pager: [phone]  Phone: [phone]

## 2017-10-11 NOTE — PLAN OF CARE
Problem: Patient Care Overview  Goal: Plan of Care/Patient Progress Review  Outcome: Improving  Vital signs stable on RA. Up with lift into wheel chair. Bilateral AKA. Prn fentanyl given for pain. Plan for d/c at 1600 back to [facility]. IV removed. Belongings returned from security. Pt educated on medications, and follow up appointments, and further care needed. Will continue to monitor.

## 2017-10-11 NOTE — PLAN OF CARE
Problem: Patient Care Overview  Goal: Plan of Care/Patient Progress Review  Patient returning to [facility] today     Physical Therapy Discharge Summary     Reason for therapy discharge:    Discharged to long term care facility.     Progress towards therapy goal(s). See goals on Care Plan in [facility] electronic health record for goal details.  Goals partially met.  Barriers to achieving goals:   discharge from facility.     Therapy recommendation(s):    Continued therapy is recommended.  Rationale/Recommendations:  to progress safety and independence with functional mobility.

## 2017-10-12 ENCOUNTER — TELEPHONE (OUTPATIENT)
Dept: INTERNAL MEDICINE | Facility: CLINIC | Age: 67
End: 2017-10-12

## 2017-10-12 ENCOUNTER — CARE COORDINATION (OUTPATIENT)
Dept: CARE COORDINATION | Facility: CLINIC | Age: 67
End: 2017-10-12

## 2017-10-12 NOTE — PROGRESS NOTES
Patient was left message with return call back information by an RN for post DC follow up so no duplicate post DC follow up call will be made              Mel Hare, EDWARDO        ED / Discharge Outreach Protocol     Patient Contact     Attempt # 1     Was call answered?  No.  Left message on voicemail with information to call me back.

## 2017-10-12 NOTE — PROGRESS NOTES
10/11/17: CM received call from member stating that she will be d/c home today - she states she is feeling much better and ready to be home.  She has f/u with Urology on 10/18 and has a f/u with PCP scheduled for 10/23.  Member states she will be seen by [facility] therapy.        Received another phone call from member stating she is not being d/c home until 4 - she was hoping for earlier.   She states that [facility] worker is coming tomorrow.   Member also states that she was looking at bank account and the amount pulled for rent seems less than she thought she owed.  She will f/u with Crystal or Jovana tomorrow at [facility] re: this.      AIDE log updated.  PCP already aware per Epic.     Jamie Sharma RN, PHN  [facility]

## 2017-10-13 ENCOUNTER — TELEPHONE (OUTPATIENT)
Dept: NURSING | Facility: CLINIC | Age: 67
End: 2017-10-13

## 2017-10-13 NOTE — TELEPHONE ENCOUNTER
ED / Discharge Outreach Protocol    Patient Contact    Attempt # 3    Was call answered?  No.  Left message on voicemail with information to call me back.

## 2017-10-13 NOTE — TELEPHONE ENCOUNTER
Abby, nurse from [facility], calling with request to delay start of care until tomorrow, 10/14.  Verbal consent given per standing nurse orders.  Deandra Peralta RN

## 2017-10-14 LAB
BACTERIA SPEC CULT: NO GROWTH
BACTERIA SPEC CULT: NO GROWTH
SPECIMEN SOURCE: NORMAL
SPECIMEN SOURCE: NORMAL

## 2017-10-16 ENCOUNTER — TELEPHONE (OUTPATIENT)
Dept: NURSING | Facility: CLINIC | Age: 67
End: 2017-10-16

## 2017-10-16 NOTE — TELEPHONE ENCOUNTER
Sua nurse with [facility] requesting resumption of care for skilled nursing, PT, and OT.  Approved per standing orders.

## 2017-10-17 NOTE — PROGRESS NOTES
10/13/17: CM spoke with member - she states that she is doing ok - tired but glad to be home.  Member states she has no needs at this time.  [facility] will be out.     Jamie Sharma RN, N  [employer]

## 2017-10-18 ENCOUNTER — OFFICE VISIT (OUTPATIENT)
Dept: UROLOGY | Facility: CLINIC | Age: 67
End: 2017-10-18
Payer: COMMERCIAL

## 2017-10-18 VITALS
WEIGHT: 135 LBS | SYSTOLIC BLOOD PRESSURE: 108 MMHG | HEIGHT: 67 IN | BODY MASS INDEX: 21.19 KG/M2 | DIASTOLIC BLOOD PRESSURE: 62 MMHG | HEART RATE: 90 BPM

## 2017-10-18 DIAGNOSIS — J44.9 CHRONIC OBSTRUCTIVE PULMONARY DISEASE, UNSPECIFIED COPD TYPE (H): ICD-10-CM

## 2017-10-18 DIAGNOSIS — I73.9 PERIPHERAL VASCULAR DISEASE (H): ICD-10-CM

## 2017-10-18 DIAGNOSIS — R39.81 FUNCTIONAL INCONTINENCE: Primary | ICD-10-CM

## 2017-10-18 PROCEDURE — 99213 OFFICE O/P EST LOW 20 MIN: CPT | Performed by: UROLOGY

## 2017-10-18 ASSESSMENT — PAIN SCALES - GENERAL: PAINLEVEL: EXTREME PAIN (8)

## 2017-10-18 NOTE — PROGRESS NOTES
"October 18, 2017    Return visit    Patient returns today for follow up.  She was in the hospital again for urosepsis.  No stones, hydro or other etiology noted.  She is here today to discuss SPT tube as she has now come off her anticoagulation.  She continues to desire an SP tube secondary to functional incontinence as she has B BKAs.  She denies any changes in her health since last visit.    /62  Pulse 90  Ht 1.702 m (5' 7\")  Wt 61.2 kg (135 lb)  Breastfeeding? No  BMI 21.14 kg/m2  She is comfortable, in no distress, non-labored breathing.      A/P: 67 year old F with functional incontinence desiring a SPT    Discussed risks of the procedure to include but not limited to bleeding, infection, chronic colonization of the catheter, injury to bowel/bladder/blood vessels, risk of squamous cell carcinoma from chronic irritation from the tube, persistent transurethral urine loss, and the fact that the tube needs to be changed monthly    Patient expresses understanding and wishes to proceed.  Will have her meet with PAC to determine location of the surgery.  She wishes to proceed as soon as we can and is willing to have someone else do the surgery on the [place] if I am unable to fit her in to my schedule in a timely fashion.    15 minutes were spent with the patient today, > 50% in counseling and coordination of care    Elizabeth Oliver MD MPH   of Urology    CC  Patient Care Team:  Allan Casey MD as PCP - General (Internal Medicine)  Lexii Cardenas, RN as Registered Nurse (Diabetic Education)  Shavon Elam RD as Registered Dietitian (Nutrition)  Jamie Sharma, EDWARDO as   Ernestine Weldon as Other (see comments)  Michelle Irizarry MD as MD (Internal Medicine)  Savannah Durant MD as MD (Vascular Surgery)  Allan Casey MD as MD (Internal Medicine)  zAael Arriaga MD as Resident (Ophthalmology)  Dayna Ellis NP as Nurse Practitioner (Nurse " Practitioner)  Self, Lisa, MD as Referring Physician  Janusz Rm MD as MD (Ophthalmology)  Alina Jennings MD as MD (Pulmonary)  Mello Arroyo MD as MD (Family Medicine - Sports Medicine)  Mary Elliott OT as Occupational Therapist (Occupational Therapy)  Tonie Syed, RN as Nurse Coordinator (Vascular Surgery)  Salome Neri, Elizabeth Farah MD as MD (Urology)  Yi Mathews, RN as Registered Nurse (Urology)

## 2017-10-18 NOTE — LETTER
"10/18/2017       RE: Sonya Foote  [address]   [place] [address]     Dear Colleague,    Thank you for referring your patient, Sonya Foote, to the [facility]. Please see a copy of my visit note below.    October 18, 2017    Return visit    Patient returns today for follow up.  She was in the hospital again for urosepsis.  No stones, hydro or other etiology noted.  She is here today to discuss SPT tube as she has now come off her anticoagulation.  She continues to desire an SP tube secondary to functional incontinence as she has B BKAs.  She denies any changes in her health since last visit.    /62  Pulse 90  Ht 1.702 m (5' 7\")  Wt 61.2 kg (135 lb)  Breastfeeding? No  BMI 21.14 kg/m2  She is comfortable, in no distress, non-labored breathing.      A/P: 67 year old F with functional incontinence desiring a SPT    Discussed risks of the procedure to include but not limited to bleeding, infection, chronic colonization of the catheter, injury to bowel/bladder/blood vessels, risk of squamous cell carcinoma from chronic irritation from the tube, persistent transurethral urine loss, and the fact that the tube needs to be changed monthly    Patient expresses understanding and wishes to proceed.  Will have her meet with PAC to determine location of the surgery.  She wishes to proceed as soon as we can and is willing to have someone else do the surgery on the [place] if I am unable to fit her in to my schedule in a timely fashion.    15 minutes were spent with the patient today, > 50% in counseling and coordination of care    Elizabeth Oliver MD MPH   of Urology    CC  Patient Care Team:  Allan Casey MD as PCP - General (Internal Medicine)  Lexii Cardenas, RN as Registered Nurse (Diabetic Education)  Shavon Elam RD as Registered Dietitian (Nutrition)  Jamie Sharma, RN " as   Ernestine Weldon as Other (see comments)  Michelle Irizarry MD as MD (Internal Medicine)  Savannah Duarnt MD as MD (Vascular Surgery)  Allan Casey MD as MD (Internal Medicine)  Azael Arriaga MD as Resident (Ophthalmology)  Dayna Ellis NP as Nurse Practitioner (Nurse Practitioner)  Self, Referred, MD as Referring Physician  Janusz Rm MD as MD (Ophthalmology)  Alina Jennings MD as MD (Pulmonary)  Mello Arroyo MD as MD (Family Medicine - Sports Medicine)  Mary Elliott OT as Occupational Therapist (Occupational Therapy)  Tonie Syed, RN as Nurse Coordinator (Vascular Surgery)  Salome Neri Cynthia See Ming, MD as MD (Urology)  Yi Mathews, RN as Registered Nurse (Urology)

## 2017-10-18 NOTE — Clinical Note
If patient needs to be done at [place] we may need to see if someone can get her in sooner....  Thanks

## 2017-10-18 NOTE — MR AVS SNAPSHOT
After Visit Summary   10/18/2017    Sonya Foote    MRN: [id number]           Patient Information     Date Of Birth          1950        Visit Information        Provider Department      10/18/2017 12:00 PM Elizabeth Oliver MD [facility] Urology [facility]        Care Instructions    If you do not hear anything by beginning of next week please call my office to inquire    Angie/Zainab surgery schedulers [phone].  Yi BROOKE Care Coordinator [phone].          Follow-ups after your visit        Your next 10 appointments already scheduled     Oct 23, 2017  8:50 AM CDT   US CAROTID BILATERAL with SHUS1   [facility] Ultrasound ([facility])    [address] [address] [phone]           Please bring a list of your medicines (including vitamins, minerals and over-the-counter drugs). Also, tell your doctor about any allergies you may have. Wear comfortable clothes and leave your valuables at home.  You do not need to do anything special to prepare for your exam.  Please call the Imaging Department at your exam site with any questions.            Oct 23, 2017 11:20 AM CDT   Office Visit with Allan Casey MD   [facility] ([facility]    [address] [place] [address] [phone]           Bring a current list of meds and any records pertaining to this visit. For Physicals, please bring immunization records and any forms needing to be filled out. Please arrive 10 minutes early to complete paperwork.            Oct 23, 2017 12:00 PM CDT   LAB with [facility]   [facility] ([address] [id number] [phone]           Patient must bring picture ID. Patient should be prepared to give a urine specimen  Please do not eat 10-12 hours before your appointment if  you are coming in fasting for labs on lipids, cholesterol, or glucose (sugar). Pregnant women should follow their Care Team instructions. Water with medications is okay. Do not drink coffee or other fluids. If you have concerns about taking  your medications, please ask at office or if scheduling via "[employer]hart, send a message by clicking on Secure Messaging, Message Your Care Team.            Oct 25, 2017  1:30 PM CDT   NUTRITION VISIT with Melonie Guerrero RD   [facility])    [address] [facility] [address]   [phone]            Nov 02, 2017  9:15 AM CDT   RETURN GLAUCOMA with Marely Robin MD   [facility] Clinic ([facility])    [facility]  [address] [place] [address]  [address] [address]  [facility] [address]   [phone]            Nov 10, 2017  9:20 AM CST   (Arrive by 9:05 AM)   RETURN DIABETES with Michelle Irizarry MD   [facility] Endocrinology ([facility])    [address] [place] [address]   [phone]            Dec 29, 2017 10:00 AM CST   (Arrive by 9:45 AM)   New Patient Visit with VICTOR M Floyd [facility])    [address] [place] [address]   [phone]            Feb 19, 2018  9:30 AM CST   (Arrive by 9:15 AM)   Return Visit with Alina Jennings MD   [facility] ([facility]    [address] [place] [address]   [phone]              Who to contact     If you have questions or need follow up information about today's clinic visit or your schedule please contact [facility] directly at [phone].  Normal or non-critical lab and imaging results will be communicated to you by MyChart, letter or  "phone within 4 business days after the clinic has received the results. If you do not hear from us within 7 days, please contact the clinic through [employer]Link or phone. If you have a critical or abnormal lab result, we will notify you by phone as soon as possible.  Submit refill requests through HotLink or call your pharmacy and they will forward the refill request to us. Please allow 3 business days for your refill to be completed.          Additional Information About Your Visit        HotLink Information     HotLink lets you send messages to your doctor, view your test results, renew your prescriptions, schedule appointments and more. To sign up, go to [email or web address] . Click on \"Log in\" on the left side of the screen, which will take you to the Welcome page. Then click on \"Sign up Now\" on the right side of the page.     You will be asked to enter the access code listed below, as well as some personal information. Please follow the directions to create your username and password.     Your access code is: [email or web address]  Expires: 2018 12:09 PM     Your access code will  in 90 days. If you need help or a new code, please call your [facility] clinic or [phone].        Care EveryWhere ID     This is your Care EveryWhere ID. This could be used by other organizations to access your [facility] medical records  [id number]        Your Vitals Were     Pulse Height Breastfeeding? BMI (Body Mass Index)          90 1.702 m (5' 7\") No 21.14 kg/m2         Blood Pressure from Last 3 Encounters:   10/18/17 108/62   10/11/17 155/79   10/03/17 148/84    Weight from Last 3 Encounters:   10/18/17 61.2 kg (135 lb)   10/10/17 72.7 kg (160 lb 3.2 oz)   10/03/17 67.1 kg (148 lb)              Today, you had the following     No orders found for display         Today's Medication Changes          These changes are accurate as of: 10/18/17 12:09 PM.  If you have any questions, ask your nurse or doctor.               These " medicines have changed or have updated prescriptions.        Dose/Directions    aspirin 81 MG EC tablet   This may have changed:  when to take this   Used for:  Unstable angina (H)        Dose:  81 mg   Take 1 tablet (81 mg) by mouth daily   Quantity:  90 tablet   Refills:  3       atorvastatin 40 MG tablet   Commonly known as:  LIPITOR   This may have changed:  when to take this   Used for:  Hyperlipidemia LDL goal <100        Dose:  40 mg   Take 1 tablet (40 mg) by mouth daily   Quantity:  90 tablet   Refills:  3       hydrochlorothiazide 12.5 MG capsule   Commonly known as:  MICROZIDE   This may have changed:  additional instructions   Used for:  Essential hypertension with goal blood pressure less than 130/80        Dose:  12.5 mg   Take 1 capsule (12.5 mg) by mouth daily   Quantity:  90 capsule   Refills:  3       Phenytoin Sodium Extended 200 MG Caps   This may have changed:  additional instructions   Used for:  Seizure disorder (H)        Dose:  200 mg   Take 200 mg by mouth 2 times daily   Quantity:  60 capsule   Refills:  0                Primary Care Provider Office Phone # Fax #    Allan Casey [phone]       [address] [place] [address]        Equal Access to Services     [facility] : Hadii shaheen gastelum hadasho Sojoe, waaxda luqadaha, qaybta kaalmada shashank, tonie marroquin. So [facility] [phone].    ATENCIÓN: Si habla español, tiene a briones disposición servicios gratuitos de asistencia lingüística. [facility] [phone].    We comply with applicable federal civil rights laws and [place] laws. We do not discriminate on the basis of race, color, national origin, age, disability, sex, sexual orientation, or gender identity.            Thank you!     Thank you for choosing [facility] [place]  for your care. Our goal is always to provide you with excellent care. Hearing back from our patients is one way we can  continue to improve our services. Please take a few minutes to complete the written survey that you may receive in the mail after your visit with us. Thank you!             Your Updated Medication List - Protect others around you: Learn how to safely use, store and throw away your medicines at www.disposemymeds.org.          This list is accurate as of: 10/18/17 12:09 PM.  Always use your most recent med list.                   Brand Name Dispense Instructions for use Diagnosis    ACETAMINOPHEN PO      Take 1,000 mg by mouth every 6 hours as needed for fever Taking q4-6 hours, per MAR        ADVAIR DISKUS 250-50 MCG/DOSE diskus inhaler   Generic drug:  fluticasone-salmeterol      Inhale 1 puff into the lungs 2 times daily        albuterol (2.5 MG/3ML) 0.083% neb solution     360 mL    INHALE 1 VIAL VIA NEBULIZER EVERY 6 HOURS AS NEEDED    Chronic obstructive pulmonary disease, unspecified COPD type (H)       aspirin 81 MG EC tablet     90 tablet    Take 1 tablet (81 mg) by mouth daily    Unstable angina (H)       atorvastatin 40 MG tablet    LIPITOR    90 tablet    Take 1 tablet (40 mg) by mouth daily    Hyperlipidemia LDL goal <100       blood glucose monitoring meter device kit     1 kit    Use to test blood sugars 3 times daily or as directed.    Type 2 diabetes, HbA1C goal < 8% (H)       blood glucose monitoring test strip    ONE TOUCH ULTRA    3 Box    Use to test blood sugars 3 times daily or as directed.    Type 2 diabetes mellitus with diabetic peripheral angiopathy without gangrene, without long-term current use of insulin (H)       calcium citrate-vitamin D 315-250 MG-UNIT Tabs per tablet    CITRACAL    120 tablet    Take 2 tablets by mouth daily    Osteoporosis       cetirizine 10 MG tablet    zyrTEC    90 tablet    Take 1 tablet (10 mg) by mouth daily as needed for allergies    Seasonal allergic rhinitis, unspecified allergic rhinitis trigger       CYANOCOBALAMIN PO      Take 2,000 mcg by mouth daily         cyclobenzaprine 10 MG tablet    FLEXERIL    30 tablet    Take 1 tablet (10 mg) by mouth 2 times daily as needed for muscle spasms    Cervicalgia       diclofenac 1 % Gel topical gel    VOLTAREN    100 g    Apply 2 g topically 4 times daily to hands    Primary osteoarthritis of both hands       dorzolamide 2 % ophthalmic solution    TRUSOPT     Place 1 drop into both eyes 2 times daily        * EASY TOUCH LANCETS 30G/TWIST Misc           * blood glucose monitoring lancets     100 each    Use to test blood sugar 3 times daily or as directed.    Type 2 diabetes mellitus with diabetic peripheral angiopathy without gangrene, without long-term current use of insulin (H)       EPINEPHrine 0.15 MG/0.3ML injection 2-pack    EPIPEN JR    0.6 mL    Inject 0.3 mLs (0.15 mg) into the muscle as needed for anaphylaxis    Bee sting reaction, undetermined intent, subsequent encounter       ESCITALOPRAM OXALATE PO      Take 10 mg by mouth daily        estradiol 1 MG tablet    ESTRACE    90 tablet    Take 1 tablet (1 mg) by mouth daily    Person who has had sex change operation       ferrous sulfate 325 (65 FE) MG tablet    IRON    60 tablet    Take 1 tablet (325 mg) by mouth 2 times daily With meals        fluticasone 50 MCG/ACT spray    FLONASE     Spray 1 spray into both nostrils daily        hydrochlorothiazide 12.5 MG capsule    MICROZIDE    90 capsule    Take 1 capsule (12.5 mg) by mouth daily    Essential hypertension with goal blood pressure less than 130/80       INCRUSE ELLIPTA 62.5 MCG/INH oral inhaler   Generic drug:  umeclidinium      Inhale 1 puff into the lungs daily        RENÉE Pack      Take 1 packet by mouth 2 times daily        latanoprost 0.005 % ophthalmic solution    XALATAN    2.5 mL    Place 1 drop into both eyes At Bedtime    Primary open angle glaucoma, stage unspecified       levETIRAcetam 500 MG tablet    KEPPRA    180 tablet    Take 1 tablet (500 mg) by mouth 2 times daily    Nausea       lidocaine 5 %  Patch    LIDODERM    30 patch    APPLY 1 TO 3 PATCHES TO PAINFUL AREA AT ONCE FOR UP TO 12 HOURS WITHIN A 24 HOUR PERIOD REMOVE AFTER 12 HOURS    Chronic pain syndrome, 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 (H)       lisinopril 10 MG tablet    PRINIVIL/ZESTRIL    90 tablet    Take 1 tablet (10 mg) by mouth daily    Essential hypertension with goal blood pressure less than 130/80       MEDICATION GIVEN BY INTRATHECAL PUMP - INSTRUCTION      continuous May 19, 2017 - per [facility] [place] [phone]: Conc: Bupivacaine 20 mg/mL and Fentanyl 2000 mcg/mL. Continuous: Bupivacaine 7.265 mg/day and Fentanyl 726.5 mcg/day. Boluses: Up to 7 boluses per 24-hr period of Bupivicaine 0.599 mg and Fentanyl 59.9 mcg  Pump Refill Date at [facility]: 7/2/17        metoprolol 25 MG 24 hr tablet    TOPROL-XL    30 tablet    TAKE 1 TABLET (25 MG) BY MOUTH DAILY    Old myocardial infarction       MULTIVITAMIN PO      Take 1 tablet by mouth daily        nitroGLYcerin 0.4 MG sublingual tablet    NITROSTAT    25 tablet    Place 1 tablet (0.4 mg) under the tongue every 5 minutes as needed for chest pain if you are still having symptoms after 3 doses (15 minutes) call 911.    Chronic systolic congestive heart failure (H), Old myocardial infarction       ONDANSETRON PO      Take 4 mg by mouth 3 times daily        * order for DME     1 Month    Equipment being ordered: Depends briefs    Incontinence       * order for DME     1 Device    Equipment being ordered: Power Wheelchair    CVA (cerebral infarction), HTN (hypertension)       * order for DME     1 Units    Equipment being ordered: Nebulizer and supplies    Obstructive chronic bronchitis with exacerbation (H)       * order for DME     1 Box    Equipment being ordered: Glucerna daily shakes with each meal    Type 2 diabetes mellitus with other diabetic neurological complication       * order for DME     1 Device    ACcu check BID  "   Type 2 diabetes mellitus with diabetic peripheral angiopathy without gangrene, without long-term current use of insulin (H)       * order for DME     1 Units    Equipment being ordered: Nebulizer and tubing supplies    Simple chronic bronchitis (H)       * order for DME     1 Device    Equipment being ordered: CPAP supplies mask, hose, filters, cushion fax to [facility] at [phone] Disp: 10 DeviceRefills: prn Class: Local PrintStart: 2/10/2017    Chronic obstructive pulmonary disease, unspecified COPD type (H)       * order for DME     10 Device    Equipment being ordered: CPAP supplies mask, hose, filters, cushion  fax to [facility] at [phone]    COPD (chronic obstructive pulmonary disease) (H)       * order for DME     1 Device    Equipment being ordered: wheelchair seat cushion    Critical lower limb ischemia       * order for DME     1 Device    roho w/c cushion 18\" X 18\" for pressure relief    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 (H)       * order for DME     1 Device    Hospital bed with side rails    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 (H)       * order for DME     1 Device    Full electric hospital bed with half rails  Dx: L75879, I110, J449 Length of need: lifetime    Status post bilateral above knee amputation (H)       * order for DME     1 Device    Wheel Chair Cushion: 18 x 18 inch Roho cushion    Status post bilateral above knee amputation (H)       pantoprazole 40 MG EC tablet    PROTONIX     Take 40 mg by mouth daily        Phenytoin Sodium Extended 200 MG Caps     60 capsule    Take 200 mg by mouth 2 times daily    Seizure disorder (H)       polyethylene glycol Packet    MIRALAX/GLYCOLAX     Take 17 g by mouth daily Dissolved in water or juice        * pregabalin 75 MG capsule    LYRICA    120 capsule    Take 2 capsules (150 mg) by mouth 2 times daily    Pain in both upper extremities       * LYRICA 100 MG capsule   Generic drug:  " pregabalin           prochlorperazine 10 MG tablet    COMPAZINE    20 tablet    Take 1 tablet (10 mg) by mouth every 8 hours as needed    Nausea with vomiting       risperiDONE 0.5 MG tablet    risperDAL     Take 0.5 mg by mouth At Bedtime        sucralfate 1 GM tablet    CARAFATE    40 tablet    Take 1 tablet (1 g) by mouth 4 times daily May dissolve in 10 mL water is necessary. (Start upon completion of carafate suspension)    Gastroesophageal reflux disease without esophagitis       * traZODone 100 MG tablet    DESYREL    90 tablet    Take 1.5 tablets (150 mg) by mouth At Bedtime    Anxiety state       * traZODone 50 MG tablet    DESYREL          vitamin D 2000 UNITS tablet     100 tablet    Take 2,000 Units by mouth daily.        zinc 50 MG Tabs      Take 1 tablet by mouth daily        * Notice:  This list has 19 medication(s) that are the same as other medications prescribed for you. Read the directions carefully, and ask your doctor or other care provider to review them with you.

## 2017-10-18 NOTE — NURSING NOTE
Chief Complaint   Patient presents with     Consult For     Patient is here to discuss SPT placement. She has been off of her blood thinners for about 4 weeks now.      Susana Patel LPN 11:45 AM October 18, 2017

## 2017-10-18 NOTE — PATIENT INSTRUCTIONS
If you do not hear anything by beginning of next week please call my office to inquire    Angie/Zainab surgery schedulers [phone].  Yi BROOKE Care Coordinator [phone].

## 2017-10-23 ENCOUNTER — OFFICE VISIT (OUTPATIENT)
Dept: INTERNAL MEDICINE | Facility: CLINIC | Age: 67
End: 2017-10-23
Payer: COMMERCIAL

## 2017-10-23 VITALS
OXYGEN SATURATION: 96 % | SYSTOLIC BLOOD PRESSURE: 140 MMHG | DIASTOLIC BLOOD PRESSURE: 80 MMHG | HEART RATE: 86 BPM | BODY MASS INDEX: 21.14 KG/M2 | TEMPERATURE: 98.2 F | WEIGHT: 135 LBS

## 2017-10-23 DIAGNOSIS — G47.33 OSA (OBSTRUCTIVE SLEEP APNEA): ICD-10-CM

## 2017-10-23 DIAGNOSIS — Z89.511 STATUS POST BELOW KNEE AMPUTATION OF RIGHT LOWER EXTREMITY (H): ICD-10-CM

## 2017-10-23 DIAGNOSIS — I10 ESSENTIAL HYPERTENSION: ICD-10-CM

## 2017-10-23 DIAGNOSIS — E78.5 HYPERLIPIDEMIA LDL GOAL <100: ICD-10-CM

## 2017-10-23 DIAGNOSIS — E11.51 TYPE 2 DIABETES MELLITUS WITH DIABETIC PERIPHERAL ANGIOPATHY WITHOUT GANGRENE, WITHOUT LONG-TERM CURRENT USE OF INSULIN (H): ICD-10-CM

## 2017-10-23 DIAGNOSIS — A41.89 SEPSIS DUE TO OTHER ETIOLOGY (H): ICD-10-CM

## 2017-10-23 DIAGNOSIS — Z09 HOSPITAL DISCHARGE FOLLOW-UP: Primary | ICD-10-CM

## 2017-10-23 DIAGNOSIS — G40.909 SEIZURE DISORDER (H): ICD-10-CM

## 2017-10-23 DIAGNOSIS — G89.4 CHRONIC PAIN SYNDROME: ICD-10-CM

## 2017-10-23 DIAGNOSIS — I50.22 CHRONIC SYSTOLIC CONGESTIVE HEART FAILURE (H): ICD-10-CM

## 2017-10-23 LAB — PHENYTOIN SERPL-MCNC: 11.7 MG/L (ref 10–20)

## 2017-10-23 PROCEDURE — 99000 SPECIMEN HANDLING OFFICE-LAB: CPT | Performed by: INTERNAL MEDICINE

## 2017-10-23 PROCEDURE — 80185 ASSAY OF PHENYTOIN TOTAL: CPT | Performed by: INTERNAL MEDICINE

## 2017-10-23 PROCEDURE — 99495 TRANSJ CARE MGMT MOD F2F 14D: CPT | Performed by: INTERNAL MEDICINE

## 2017-10-23 PROCEDURE — 36415 COLL VENOUS BLD VENIPUNCTURE: CPT | Performed by: INTERNAL MEDICINE

## 2017-10-23 PROCEDURE — 80186 ASSAY OF PHENYTOIN FREE: CPT | Mod: 90 | Performed by: INTERNAL MEDICINE

## 2017-10-23 RX ORDER — ALBUTEROL SULFATE 90 UG/1
2 AEROSOL, METERED RESPIRATORY (INHALATION) EVERY 6 HOURS PRN
Qty: 3 INHALER | Refills: 1 | Status: SHIPPED | OUTPATIENT
Start: 2017-10-23 | End: 2018-02-08

## 2017-10-23 RX ORDER — HYDROCHLOROTHIAZIDE 12.5 MG/1
12.5 CAPSULE ORAL DAILY
Qty: 90 CAPSULE | Refills: 3 | Status: CANCELLED | OUTPATIENT
Start: 2017-10-23

## 2017-10-23 RX ORDER — ATORVASTATIN CALCIUM 40 MG/1
40 TABLET, FILM COATED ORAL AT BEDTIME
Qty: 90 TABLET | Refills: 3 | Status: CANCELLED | OUTPATIENT
Start: 2017-10-23

## 2017-10-23 RX ORDER — LIDOCAINE 50 MG/G
PATCH TOPICAL
Qty: 30 PATCH | Refills: 5 | Status: CANCELLED | OUTPATIENT
Start: 2017-10-23

## 2017-10-23 NOTE — PROGRESS NOTES
SUBJECTIVE:   Sonya Foote is a 67 year old female who presents to clinic today for the following health issues:    Requesting orders for UA faxed to [facility]. Needs new order for rescue inhaler     Hospital Follow-up Visit:    Hospital/Nursing Home/IP Rehab Facility: [facility]  Date of Admission: 10/8/17  Date of Discharge: 10/11/2017  Reason(s) for Admission: UTI            Problems taking medications regularly:  None       Medication changes since discharge: noted       Problems adhering to non-medication therapy:  None    Summary of hospitalization:  [facility] discharge summary reviewed  Diagnostic Tests/Treatments reviewed.  Follow up needed: Urology  Other Healthcare Providers Involved in Patient s Care:         None  Update since discharge: stable.     Post Discharge Medication Reconciliation: discharge medications reconciled, continue medications without change.  Plan of care communicated with patient     Coding guidelines for this visit:  Type of Medical   Decision Making Face-to-Face Visit       within 7 Days of discharge Face-to-Face Visit        within 14 days of discharge   Moderate Complexity [id number]   High Complexity [id number]            #Sepsis 2/2 UTI  #Recurrent UTIs  Dirty UA in the setting of recurrent UTIs secondary to incontinence and multiple prior pelvic surgeries. Met SIRS criteria on admission with leukocytosis to 16.9 and tachycardia, o/w HDS and lactate wnl. Followed by urology as outpatient and long term plan is for suprapubic cather once able to come off anticoagulation which just happened last week (1 year post-coronary stent, see below). CT negative for stones or hydronephrosis. BCx from 10/8 ngtd, UCx from 10/8 >100k Klebsella pneumoniae. She was started on levofloxacin iv on admission based on prior sensitivities. Continued during stay and discharged on oral levofloxacin to complete 5 day course, last dose 10/12.  - Will  need to follow up with Urology to discuss suprapubic catheter placement, appointment already scheduled per patient.      #CAD  #Recent NSTEMI 9/2016 s/p stenting, completed 1 year of plavix   #HFpEF   #HTN  Stress test 10/2017 showing inferior wall MI without significant ischemia, plan per cards was to stop plavix as of 10/2/17. No active chest pain at this time. Plavix discontinued on admission, also in anticipation of needing suprapubic catheter, as above. ASA continued. Antihypertensives initially held given sepsis (metoprolol, lisinopril, HCTZ).  - Restarted pta metoprolol, lisinopril, and HCTZ on discharge as BP returned to baseline as she began to tolerate oral intake and recovered from infection.     #PAD s/p bilateral lower extremity amputations  Multiple previous surgeries. Bilateral femoral stenting in the past. Continued ASA  - OK to d/c plavix as above; discussed with vascular surgery and no need for plavix this far out from stenting      #Transgender  #BPH  Patient does have a prostate. Tamsulosin held in setting of infection during admission. Estrogen hormone replacement therapy continued while inpatient.      #Chronic pain  Followed in pain clinic has pain pump which delivers continuous fentanyl as well as a max of 7 bumps daily. Patient did not bring her button to give herself bumps. Fentanyl pain pump in place to provide continuous doses. She was given Fentanyl 25 mcg q3h prn for breakthrough (has PCA at home but she didn't bring it, this seems to be managing her pain sufficiently thus far). Continued pta lyrica, d/c'd gabapentin (redundant on MAR)      #Dysphagia 2/2 esophageal strictures s/p previous dilatations  #Chronic erosive GERD  Most recent dilation in 9/2016 at [facility]. Patient complaining of difficulty swallowing. Continued pta protonix and carafate. Zofran and compazine used for nausea/vomiting, IV fluids given when not tolerating po - she was tolerating orals prior to  discharge.      #Allergic rhinitis  #COPD  Last PFTs 3/2015 with FEV1 95%, DLCO 64%. Continued home meds, including: zyrtc, flonase, albuterol, advair.  - d/c'd Spiriva 18 mcg qday as she was also on Incruse (Umeclidinium) per MAR and shouldn't be on both      #Restless leg syndrome  Continued lyrica 150mg bid. D/c'd gabapentin and ropinerole (noted to have been stopped per 7/2017 pharmacy note)      #Depression  #Anxiety  #Schizoaffective disorder  #Insomnia  In grief counseling for the loss of her significant other, has made passive thoughts about letting herself die naturally by stopping her medications. Denies any active SI or plans. Notably changed code status to DNR/DNI this admission. Continued pta meds.  - Consider psychiatry consult vs providing resources for grief counseling/bereavement    Problem list and histories reviewed & adjusted, as indicated.  Additional history: as documented    Patient Active Problem List   Diagnosis     Hyperlipidemia LDL goal <100     Seizure disorder (H)     ACP (advance care planning)     Osteoporosis     Schizoaffective disorder (H)     AS (sickle cell trait) (H)     Vertigo     Person who has had sex change operation     Claudication in peripheral vascular disease (H)     Intestinal malabsorption     GIB (gastrointestinal bleeding)     Cervicalgia     Health Care Home     Asthma     Adjustment disorder with depressed mood     Chronic pain syndrome     Open-angle glaucoma     Hx of colonic polyp     Old myocardial infarction     Iron deficiency anemia     Late effect of stroke     Degeneration of intervertebral disc of lumbosacral region     Thoracic or lumbosacral neuritis or radiculitis     Cerebral infarction due to occlusion or stenosis of carotid artery     Disorder of bone and cartilage     Hereditary and idiopathic peripheral neuropathy     Androgen insensitivity syndrome     PAD (peripheral artery disease) (H)     Chronic systolic congestive heart failure (H)      Stenosis of carotid artery     Osteoarthritis     Pain in both upper extremities     Atherosclerotic peripheral vascular disease with rest pain (H)     Essential hypertension     Cellulitis of right ankle     Angina pectoris, crescendo (H)     Type 2 diabetes mellitus with diabetic peripheral angiopathy without gangrene, without long-term current use of insulin (H)     Anemia, unspecified type     Critical lower limb ischemia     Testicular feminization     Anxiety disorder due to general medical condition     Central retinal artery occlusion     Lumbosacral radiculitis     Peripheral neuropathy     Osteopenia     Prediabetes     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 (H)     Primary open angle glaucoma of both eyes, severe stage     Pseudophakia of right eye     Cataract, left eye     Diabetes mellitus type 2 without retinopathy (H)     Pyelonephritis     Chronic obstructive pulmonary disease, unspecified COPD type (H)     JOSE (obstructive sleep apnea)     Complex sleep apnea syndrome     Coronary artery disease of native artery of native heart with stable angina pectoris (H)     Hyperlipidemia     Ischemic cardiomyopathy     Bee sting reaction, undetermined intent, subsequent encounter     Functional incontinence     Personal history of urinary tract infection     Dysphagia     Single seizure (H)     Essential hypertension with goal blood pressure less than 130/80     Hyperlipidemia LDL goal <70     UTI (urinary tract infection)     Past Surgical History:   Procedure Laterality Date     AMPUTATE LEG ABOVE KNEE Left 6/11/2016    Procedure: AMPUTATE LEG ABOVE KNEE;  Surgeon: Mello Rodriguez MD;  [facility]: [facility]     AMPUTATE LEG BELOW KNEE Right 11/7/2016    Procedure: AMPUTATE LEG BELOW KNEE;  Surgeon: Savannah Durant MD;  Location:  OR     AMPUTATE REVISION STUMP LOWER EXTREMITY Right 11/11/2016    Procedure: AMPUTATE REVISION STUMP LOWER EXTREMITY;  Surgeon: Savannah Durant MD;  Location:   OR     AMPUTATE REVISION STUMP LOWER EXTREMITY Right 11/16/2016    Procedure: AMPUTATE REVISION STUMP LOWER EXTREMITY;  Surgeon: Savannah Durant MD;  [facility]     AMPUTATE TOE(S) Right 1/5/2016    Procedure: AMPUTATE TOE(S);  Surgeon: Mello Gaines DPM;  Location:  [facility]     ANGIOGRAM Bilateral 11/21/2014    Procedure: ANGIOGRAM;  Surgeon: Savannah Durant MD;  [facility]     ANGIOGRAM Left 1/16/2015    Procedure: ANGIOGRAM;  Surgeon: Savannah Durant MD;  [facility]     ANGIOGRAM Bilateral 9/14/2015    Procedure: ANGIOGRAM;  Surgeon: Savannah Durant MD;  [facility]     ANGIOGRAM Left 10/12/2015    Procedure: ANGIOGRAM;  Surgeon: Savannah Durant MD;  [facility]     ANGIOGRAM Right 6/6/2016    Procedure: ANGIOGRAM;  Surgeon: Savannah Durant MD;  [facility]     ANGIOPLASTY Right 6/6/2016    Procedure: ANGIOPLASTY;  Surgeon: Savannah Durant MD;  [facility]     APPENDECTOMY       BREAST SURGERY      right breast bx (benign)     CHOLECYSTECTOMY       COLONOSCOPY N/A 8/25/2014    Procedure: COLONOSCOPY;  Surgeon: Mello Ferrer MD;  [facility]: [facility]     COLONOSCOPY WITH CO2 INSUFFLATION N/A 8/20/2014    Procedure: COLONOSCOPY WITH CO2 INSUFFLATION;  Surgeon: Duane, William Charles, MD;  Location: [facility] OR     ENDARTERECTOMY FEMORAL  5/23/2014    Procedure: ENDARTERECTOMY FEMORAL;  Surgeon: Jason Joshi MD;  [facility]     ESOPHAGOSCOPY, GASTROSCOPY, DUODENOSCOPY (EGD), COMBINED  12/14/2012    Procedure: COMBINED ESOPHAGOSCOPY, GASTROSCOPY, DUODENOSCOPY (EGD), BIOPSY SINGLE OR MULTIPLE;  ESOPHAGOSCOPY, GASTROSCOPY, DUODENOSCOPY (EGD), DILATATION ;  Surgeon: Elizabeth Stevenson MD;  Location:  GI     ESOPHAGOSCOPY, GASTROSCOPY, DUODENOSCOPY (EGD), COMBINED  12/31/2013    Procedure: COMBINED ESOPHAGOSCOPY, GASTROSCOPY, DUODENOSCOPY (EGD), BIOPSY SINGLE OR MULTIPLE;;  Surgeon: Clemente Lopez MD;  [facility]     ESOPHAGOSCOPY, GASTROSCOPY, DUODENOSCOPY (EGD), COMBINED  4/1/2014     Procedure: COMBINED ESOPHAGOSCOPY, GASTROSCOPY, DUODENOSCOPY (EGD);;  Surgeon: Clemente Lopez MD;  [facility]     ESOPHAGOSCOPY, GASTROSCOPY, DUODENOSCOPY (EGD), COMBINED  6/28/2014    Procedure: COMBINED ESOPHAGOSCOPY, GASTROSCOPY, DUODENOSCOPY (EGD);  Surgeon: Clemente Lopez MD;  [facility]     ESOPHAGOSCOPY, GASTROSCOPY, DUODENOSCOPY (EGD), COMBINED N/A 8/20/2014    Procedure: COMBINED ESOPHAGOSCOPY, GASTROSCOPY, DUODENOSCOPY (EGD), BIOPSY SINGLE OR MULTIPLE;  Surgeon: Duane, William Charles, MD;  Location:  OR     ESOPHAGOSCOPY, GASTROSCOPY, DUODENOSCOPY (EGD), COMBINED N/A 8/22/2014    Procedure: COMBINED ESOPHAGOSCOPY, GASTROSCOPY, DUODENOSCOPY (EGD), BIOPSY SINGLE OR MULTIPLE;  Surgeon: Mello Ferrer MD;  [facility]     ESOPHAGOSCOPY, GASTROSCOPY, DUODENOSCOPY (EGD), COMBINED N/A 10/2/2014    Procedure: COMBINED ESOPHAGOSCOPY, GASTROSCOPY, DUODENOSCOPY (EGD), BIOPSY SINGLE OR MULTIPLE;  Surgeon: Remy Haskins MD;  [facility]     ESOPHAGOSCOPY, GASTROSCOPY, DUODENOSCOPY (EGD), COMBINED Left 12/15/2014    Procedure: COMBINED ESOPHAGOSCOPY, GASTROSCOPY, DUODENOSCOPY (EGD), BIOPSY SINGLE OR MULTIPLE;  Surgeon: Remy Haskins MD;  [facility]     ESOPHAGOSCOPY, GASTROSCOPY, DUODENOSCOPY (EGD), COMBINED N/A 2/25/2015    Procedure: COMBINED ENDOSCOPIC ULTRASOUND, ESOPHAGOSCOPY, GASTROSCOPY, DUODENOSCOPY (EGD), FINE NEEDLE ASPIRATE/BIOPSY;  Surgeon: Clemente Lugo MD;  [facility]     ESOPHAGOSCOPY, GASTROSCOPY, DUODENOSCOPY (EGD), COMBINED Left 2/25/2015    Procedure: COMBINED ESOPHAGOSCOPY, GASTROSCOPY, DUODENOSCOPY (EGD), BIOPSY SINGLE OR MULTIPLE;  Surgeon: Clemente Lugo MD;  [facility]     ESOPHAGOSCOPY, GASTROSCOPY, DUODENOSCOPY (EGD), COMBINED N/A 9/25/2016    Procedure: COMBINED ESOPHAGOSCOPY, GASTROSCOPY, DUODENOSCOPY (EGD);  Surgeon: Aziza Patiño MD;  Location:  GI     ESOPHAGOSCOPY, GASTROSCOPY, DUODENOSCOPY (EGD), COMBINED N/A  1/18/2017    Procedure: COMBINED ESOPHAGOSCOPY, GASTROSCOPY, DUODENOSCOPY (EGD), BIOPSY SINGLE OR MULTIPLE;  Surgeon: Clemente Lopez MD;  [facility]     FASCIOTOMY LOWER EXTREMITY Left 6/10/2016    Procedure: FASCIOTOMY LOWER EXTREMITY;  Surgeon: Mello Rodriguez MD;  [facility]     HC CAPSULE ENDOSCOPY N/A 8/25/2014    Procedure: CAPSULE/PILL CAM ENDOSCOPY;  Surgeon: Remy Haskins MD;  Location: [facility]     HC CAPSULE ENDOSCOPY N/A 10/2/2014    Procedure: CAPSULE/PILL CAM ENDOSCOPY;  Surgeon: Remy Haskins MD;  Location: [facility]     ORTHOPEDIC SURGERY      broken wrist repair     SEX TRANSFORMATION SURGERY, MALE TO FEMALE      1974     SINUS SURGERY      cyst removed     TONSILLECTOMY       VASCULAR SURGERY      Left carotid stent       Social History   Substance Use Topics     Smoking status: Former Smoker     Packs/day: 2.50     Years: 30.00     Types: Cigarettes, Cigars     Quit date: 11/1/2000     Smokeless tobacco: Never Used     Alcohol use No     Family History   Problem Relation Age of Onset     Dementia Mother      Glaucoma Mother      DIABETES Mother      may have been type 1, childhood     Coronary Artery Disease Mother      MI     Glaucoma Father      DIABETES Father      may hev been type 1 - ??     Heart Failure Father      CANCER Maternal Aunt      leukemia     Schizophrenia Brother      Depression Brother      Suicide Sister      Depression Sister      DIABETES Sister      CANCER Maternal Aunt      ovarian     Glaucoma Maternal Grandmother      DIABETES Maternal Grandmother      Glaucoma Maternal Grandfather      DIABETES Maternal Grandfather      Glaucoma Paternal Grandmother      DIABETES Paternal Grandmother      Glaucoma Paternal Grandfather      DIABETES Paternal Grandfather      Breast Cancer Sister      CEREBROVASCULAR DISEASE Brother      Colon Cancer No family hx of          Current Outpatient Prescriptions   Medication Sig Dispense Refill     albuterol (PROAIR  HFA/PROVENTIL HFA/VENTOLIN HFA) 108 (90 BASE) MCG/ACT Inhaler Inhale 2 puffs into the lungs every 6 hours as needed for shortness of breath / dyspnea or wheezing 3 Inhaler 1     umeclidinium (INCRUSE ELLIPTA) 62.5 MCG/INH oral inhaler Inhale 1 puff into the lungs daily       ONDANSETRON PO Take 4 mg by mouth 3 times daily       cyclobenzaprine (FLEXERIL) 10 MG tablet Take 1 tablet (10 mg) by mouth 2 times daily as needed for muscle spasms 30 tablet 1     traZODone (DESYREL) 50 MG tablet        LYRICA 100 MG capsule        lidocaine (LIDODERM) 5 % Patch APPLY 1 TO 3 PATCHES TO PAINFUL AREA AT ONCE FOR UP TO 12 HOURS WITHIN A 24 HOUR PERIOD REMOVE AFTER 12 HOURS 30 patch 5     blood glucose monitoring (ONE TOUCH ULTRASOFT) lancets Use to test blood sugar 3 times daily or as directed. 100 each PRN     blood glucose monitoring (ONE TOUCH ULTRA) test strip Use to test blood sugars 3 times daily or as directed. 3 Box 3     metoprolol (TOPROL-XL) 25 MG 24 hr tablet TAKE 1 TABLET (25 MG) BY MOUTH DAILY 30 tablet 10     traZODone (DESYREL) 100 MG tablet Take 1.5 tablets (150 mg) by mouth At Bedtime 90 tablet 3     order for [facility] Full electric hospital bed with half rails    Dx: [id number], I110, J449  Length of need: lifetime 1 Device 0     order for [facility] Wheel Chair Cushion: 18 x 18 inch Roho cushion 1 Device 0     order for [facility] Hospital bed with side rails 1 Device 0     polyethylene glycol (MIRALAX/GLYCOLAX) Packet Take 17 g by mouth daily Dissolved in water or juice       ESCITALOPRAM OXALATE PO Take 10 mg by mouth daily       ACETAMINOPHEN PO Take 1,000 mg by mouth every 6 hours as needed for fever Taking q4-6 hours, per MAR       pregabalin (LYRICA) 75 MG capsule Take 2 capsules (150 mg) by mouth 2 times daily 120 capsule 5     cetirizine (ZYRTEC) 10 MG tablet Take 1 tablet (10 mg) by mouth daily as needed for allergies 90 tablet 3     diclofenac (VOLTAREN) 1 % GEL topical gel Apply 2 g topically 4 times daily to hands 100 g  11     EPINEPHrine (EPIPEN JR) 0.15 MG/0.3ML injection Inject 0.3 mLs (0.15 mg) into the muscle as needed for anaphylaxis 0.6 mL 1     lisinopril (PRINIVIL/ZESTRIL) 10 MG tablet Take 1 tablet (10 mg) by mouth daily 90 tablet 3     pantoprazole (PROTONIX) 40 MG EC tablet Take 40 mg by mouth daily       risperiDONE (RISPERDAL) 0.5 MG tablet Take 0.5 mg by mouth At Bedtime       ferrous sulfate (IRON) 325 (65 FE) MG tablet Take 1 tablet (325 mg) by mouth 2 times daily With meals 60 tablet 2     order for DME Equipment being ordered: CPAP supplies mask, hose, filters, cushion    fax to [facility] at [phone] 10 Device prn     sucralfate (CARAFATE) 1 GM tablet Take 1 tablet (1 g) by mouth 4 times daily May dissolve in 10 mL water is necessary. (Start upon completion of carafate suspension) 40 tablet 5     order for DME Equipment being ordered: CPAP supplies mask, hose, filters, cushion fax to [facility] at [phone]  Disp: 10 Device Refills: prn   Class: Local Print Start: 2/10/2017 1 Device 0     order for DME Equipment being ordered: Nebulizer and tubing supplies 1 Units 0     albuterol (2.5 MG/3ML) 0.083% neb solution INHALE 1 VIAL VIA NEBULIZER EVERY 6 HOURS AS NEEDED 360 mL 11     CYANOCOBALAMIN PO Take 2,000 mcg by mouth daily       Nutritional Supplements (RENÉE) PACK Take 1 packet by mouth 2 times daily       fluticasone (FLONASE) 50 MCG/ACT nasal spray Spray 1 spray into both nostrils daily       ADVAIR DISKUS 250-50 MCG/DOSE diskus inhaler Inhale 1 puff into the lungs 2 times daily        calcium citrate-vitamin D (CITRACAL) 315-250 MG-UNIT TABS Take 2 tablets by mouth daily 120 tablet 5     hydrochlorothiazide (MICROZIDE) 12.5 MG capsule Take 1 capsule (12.5 mg) by mouth daily (Patient taking differently: Take 12.5 mg by mouth daily Hold for sbp<90) 90 capsule 3     estradiol (ESTRACE) 1 MG tablet Take 1 tablet (1 mg) by mouth daily 90 tablet 3     levETIRAcetam (KEPPRA) 500 MG tablet Take 1  tablet (500 mg) by mouth 2 times daily 180 tablet 1     aspirin EC 81 MG EC tablet Take 1 tablet (81 mg) by mouth daily (Patient taking differently: Take 81 mg by mouth every morning ) 90 tablet 3     blood glucose monitoring (ONE TOUCH ULTRA 2) meter device kit Use to test blood sugars 3 times daily or as directed. 1 kit 0     phenytoin 200 MG CAPS Take 200 mg by mouth 2 times daily (Patient taking differently: Take 200 mg by mouth 2 times daily (takes at 8 AM and 8 PM)) 60 capsule 0     dorzolamide (TRUSOPT) 2 % ophthalmic solution Place 1 drop into both eyes 2 times daily        zinc 50 MG TABS Take 1 tablet by mouth daily       nitroglycerin (NITROSTAT) 0.4 MG SL tablet Place 1 tablet (0.4 mg) under the tongue every 5 minutes as needed for chest pain if you are still having symptoms after 3 doses (15 minutes) call 911. 25 tablet 1     MEDICATION GIVEN BY INTRATHECAL PUMP - INSTRUCTION continuous May 19, 2017 - per [facility] in [place] [phone]:  Conc: Bupivacaine 20 mg/mL and Fentanyl 2000 mcg/mL.  Continuous: Bupivacaine 7.265 mg/day and Fentanyl 726.5 mcg/day.  Boluses: Up to 7 boluses per 24-hr period of Bupivicaine 0.599 mg and Fentanyl 59.9 mcg    Pump Refill Date at [facility]: 7/2/17       latanoprost (XALATAN) 0.005 % ophthalmic solution Place 1 drop into both eyes At Bedtime 2.5 mL 11     atorvastatin (LIPITOR) 40 MG tablet Take 1 tablet (40 mg) by mouth daily (Patient taking differently: Take 40 mg by mouth At Bedtime ) 90 tablet 3     order for DME Equipment being ordered: Glucerna daily shakes with each meal 1 Box 11     prochlorperazine (COMPAZINE) 10 MG tablet Take 1 tablet (10 mg) by mouth every 8 hours as needed 20 tablet 0     ORDER FOR DME Equipment being ordered: Power Wheelchair 1 Device 0     ORDER FOR DME Equipment being ordered: Depends briefs 1 Month 11     Cholecalciferol (VITAMIN D) 2000 UNITS tablet Take 2,000 Units by mouth daily. 100 tablet 3      Multiple Vitamin (MULTIVITAMIN OR) Take 1 tablet by mouth daily        Allergies   Allergen Reactions     Bee Venom      Penicillins Anaphylaxis     Other reaction(s): Skin Rash and/or Hives     Dilantin [Phenytoin] Other (See Comments)     Generic dilantin only per pt     Iodide Hives     09/11/15: Pt states she can use iodine and doesn't have any problems      Iodine-131      Novocaine [Procaine] Hives     Other reaction(s): Skin Rash and/or Hives     Tositumomab      BP Readings from Last 3 Encounters:   10/18/17 108/62   10/11/17 155/79   10/03/17 148/84    Wt Readings from Last 3 Encounters:   10/18/17 135 lb (61.2 kg)   10/10/17 160 lb 3.2 oz (72.7 kg)   10/03/17 148 lb (67.1 kg)          Reviewed and updated as needed this visit by clinical staff     Reviewed and updated as needed this visit by Provider         ROS:  C: NEGATIVE for fever, chills, change in weight  E: NEGATIVE for vision changes or irritation  E/M: NEGATIVE for ear, mouth and throat problems  R: NEGATIVE for significant cough or SOB  CV: NEGATIVE for chest pain, palpitations or peripheral edema  GI: NEGATIVE for nausea, abdominal pain, heartburn, or change in bowel habits  : NEGATIVE for frequency, dysuria, or hematuria less smells poorly  M: NEGATIVE for significant arthralgias or myalgia  PSYCHIATRIC: POSITIVE foranxiety, fatigue and stressors since death of partner    OBJECTIVE:                                                    /80  Pulse 86  Temp 98.2  F (36.8  C) (Oral)  Wt 135 lb (61.2 kg)  SpO2 96%  BMI 21.14 kg/m2  Body mass index is 21.14 kg/(m^2).  GENERAL: alert and no distress in wheelchair  EYES: Eyes grossly normal to inspection, extraocular movements - intact, and PERRL  HENT: ear canals- normal; TMs- normal; Nose- normal; Mouth- no ulcers, no lesions  NECK: no tenderness, no adenopathy, no asymmetry, no masses, no stiffness; thyroid- normal to palpation  RESP: lungs clear to auscultation - no rales, no  rhonchi, no wheezes  CV: regular rates and rhythm, normal S1 S2, no S3 or S4 and no click or rub   MS: bilateral AKA's noted  PSYCH: Alert and oriented times 3; speech- coherent , normal rate and volume; able to articulate logical thoughts, able to abstract reason, no tangential thoughts, no hallucinations or delusions, affect- flat     ASSESSMENT/PLAN:                                                      (Z09) Hospital discharge follow-up  (primary encounter diagnosis)  Comment: stable as noted  Plan:     (A41.89) Sepsis due to other etiology (H)  Comment: will repeat UA at NH site as patient states urine smells different  Plan: *UA reflex to Microscopic and Culture ([facility]         and [facility] (except [place] and         [place])            (I50.22) Chronic systolic congestive heart failure (H)  Comment: appears stable and euvolemic on therapy  Plan:     (E11.51) Type 2 diabetes mellitus with diabetic peripheral angiopathy without gangrene, without long-term current use of insulin (H)  Comment:   Lab Results   Component Value Date    A1C 4.5 08/24/2017    A1C 4.1 01/24/2017    A1C 4.9 06/06/2016    A1C 4.8 08/11/2015    A1C 4.7 03/06/2015     Plan: at goal on noted tyherapy    (I10) Essential hypertension  Comment: stable on current therapy, no changes  Plan:     (E78.5) Hyperlipidemia LDL goal <100  Comment:   LDL Cholesterol Calculated   Date Value Ref [facility] Status   06/06/2016 77 <100 mg/dL Final     Comment:     Desirable:       <100 mg/dl   ]  Plan: at goal per current therapy    (G89.4) Chronic pain syndrome  Comment: appears baseline  Plan: did discuss need for counseling as part of pain control in nregards to recent death of her significant other.    (Z89.511) 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 (H)  Comment: using wheelchair, no current stump concerns  Plan:     (G47.33) JOSE (obstructive sleep apnea)  Comment: refilled per request  Plan: albuterol (PROAIR HFA/PROVENTIL  HFA/VENTOLIN         HFA) 108 (90 BASE) MCG/ACT Inhaler          See Patient Instructions    Allan Casey MD  [facility]    THE MEDICATION LIST HAS BEEN FULLY RECONCILED BY THE M.D. AND THE NURSING STAFF.

## 2017-10-23 NOTE — NURSING NOTE
"Chief Complaint   Patient presents with     Hospital F/U       Initial /80  Pulse 86  Temp 98.2  F (36.8  C) (Oral)  Wt 135 lb (61.2 kg)  SpO2 96%  BMI 21.14 kg/m2 Estimated body mass index is 21.14 kg/(m^2) as calculated from the following:    Height as of 10/18/17: 5' 7\" (1.702 m).    Weight as of this encounter: 135 lb (61.2 kg).  Medication Reconciliation: complete   Daniela Hancock CMA      "

## 2017-10-23 NOTE — MR AVS SNAPSHOT
After Visit Summary   10/23/2017    Sonya Foote    MRN: [id number]           Patient Information     Date Of Birth          1950        Visit Information        Provider Department      10/23/2017 11:20 AM Allan Casey MD [facility]        Today's Diagnoses     Hospital discharge follow-up    -  1    Sepsis due to other etiology (H)        Chronic systolic congestive heart failure (H)        Type 2 diabetes mellitus with diabetic peripheral angiopathy without gangrene, without long-term current use of insulin (H)        Essential hypertension        Hyperlipidemia LDL goal <100        Chronic pain syndrome        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 (H)        JOSE (obstructive sleep apnea)        Seizure disorder (H)           Follow-ups after your visit        Your next 10 appointments already scheduled     Oct 25, 2017  1:30 PM CDT   NUTRITION VISIT with Melonie Guerrero RD   [facility] ([facility] [place])    [address] [facility] [id number] [phone]            Oct 26, 2017  9:50 AM CDT   US CAROTID BILATERAL with SHUS1   [facility] Ultrasound ([facility])    [address] [place] [id number] [phone]           Please bring a list of your medicines (including vitamins, minerals and over-the-counter drugs). Also, tell your doctor about any allergies you may have. Wear comfortable clothes and leave your valuables at home.  You do not need to do anything special to prepare for your exam.  Please call the Imaging Department at your exam site with any questions.            Nov 02, 2017  9:15 AM CDT   RETURN GLAUCOMA with Marely Robin MD   [facility] ([facility])    [facility]  [address]  [address] [address]  [facility] [address]   [phone]            Nov 06, 2017  8:30 AM CST   (Arrive by 8:15  AM)   PAC EVALUATION with  Pac Bernice 2   [facility] ([facility] [place])    [address]  [address] [facility] [address]   [phone]            Nov 06, 2017  9:30 AM CST   (Arrive by 9:15 AM)   PAC RN ASSESSMENT with Uc Pac Rn   [facility] ([facility] [place])    [address]  [address] [facility] [address]   [phone]            Nov 06, 2017 10:10 AM CST   (Arrive by 9:55 AM)   PAC Anesthesia Consult with  Pac Anesthesiologist   [facility] [place] ([facility])    [address]  [address] [facility] [address]   [phone]            Nov 10, 2017  9:20 AM CST   (Arrive by 9:05 AM)   RETURN DIABETES with Michelle Irizarry MD   [facility] Endocrinology ([facility])    [address] [place] [address]  [address] [facility] [address]   [phone]            Nov 13, 2017   Procedure with Elizabeth Oliver MD   [facility] [place] Same Day Surgery (--)    [address] [facility] [address]   [phone]            Dec 14, 2017  1:45 PM CST   (Arrive by 1:30 PM)   Post-Op with Elizabeth Oliver MD   [facility] Urology and Inst for Prostate and Urologic Cancers ([facility])    [address] [place] [address]  [address] [facility] [address] [phone]            Dec 29, 2017 10:00 AM CST   (Arrive by 9:45 AM)   New Patient Visit with VICTOR M Floyd CNP   [facility] Gastroenterology [facility] IBD [facility] ([facility])    [address]  [address] [facility] [address] [phone]              Who to contact     If you have questions or need follow up information about today's clinic visit or your schedule please contact [facility] directly at [phone].  Normal or non-critical lab and imaging results will be communicated to you  "by [employer], letter or phone within 4 business days after the clinic has received the results. If you do not hear from us within 7 days, please contact the clinic through [employer] or phone. If you have a critical or abnormal lab result, we will notify you by phone as soon as possible.  Submit refill requests through [employer] or call your pharmacy and they will forward the refill request to us. Please allow 3 business days for your refill to be completed.          Additional Information About Your Visit        [facility] [facility] Information     [employer] lets you send messages to your doctor, view your test results, renew your prescriptions, schedule appointments and more. To sign up, go to [email or web address] . Click on \"Log in\" on the left side of the screen, which will take you to the Welcome page. Then click on \"Sign up Now\" on the right side of the page.     You will be asked to enter the access code listed below, as well as some personal information. Please follow the directions to create your username and password.     Your access code is: [email or web address]  Expires: 2018 12:09 PM     Your access code will  in 90 days. If you need help or a new code, please call your [place] clinic or [phone].        Care EveryWhere ID     This is your Care EveryWhere ID. This could be used by other organizations to access your [place] medical records  [id number]        Your Vitals Were     Pulse Temperature Pulse Oximetry BMI (Body Mass Index)          86 98.2  F (36.8  C) (Oral) 96% 21.14 kg/m2         Blood Pressure from Last 3 Encounters:   10/23/17 140/80   10/18/17 108/62   10/11/17 155/79    Weight from Last 3 Encounters:   10/23/17 135 lb (61.2 kg)   10/18/17 135 lb (61.2 kg)   10/10/17 160 lb 3.2 oz (72.7 kg)              We Performed the Following     *UA reflex to Microscopic and Culture ([place] and [place] [facility] (except [place] and [place])     PHENYTOIN FREE     PHENYTOIN LEVEL          Today's Medication " Changes          These changes are accurate as of: 10/23/17 12:06 PM.  If you have any questions, ask your nurse or doctor.               These medicines have changed or have updated prescriptions.        Dose/Directions    * albuterol (2.5 MG/3ML) 0.083% neb solution   This may have changed:  Another medication with the same name was added. Make sure you understand how and when to take each.   Used for:  Chronic obstructive pulmonary disease, unspecified COPD type (H)   Changed by:  Allan Casey MD        INHALE 1 VIAL VIA NEBULIZER EVERY 6 HOURS AS NEEDED   Quantity:  360 mL   Refills:  11       * albuterol 108 (90 BASE) MCG/ACT Inhaler   Commonly known as:  PROAIR HFA/PROVENTIL HFA/VENTOLIN HFA   This may have changed:  You were already taking a medication with the same name, and this prescription was added. Make sure you understand how and when to take each.   Used for:  JOSE (obstructive sleep apnea)   Changed by:  Allan Casey MD        Dose:  2 puff   Inhale 2 puffs into the lungs every 6 hours as needed for shortness of breath / dyspnea or wheezing   Quantity:  3 Inhaler   Refills:  1       aspirin 81 MG EC tablet   This may have changed:  when to take this   Used for:  Unstable angina (H)        Dose:  81 mg   Take 1 tablet (81 mg) by mouth daily   Quantity:  90 tablet   Refills:  3       atorvastatin 40 MG tablet   Commonly known as:  LIPITOR   This may have changed:  when to take this   Used for:  Hyperlipidemia LDL goal <100        Dose:  40 mg   Take 1 tablet (40 mg) by mouth daily   Quantity:  90 tablet   Refills:  3       blood glucose monitoring lancets   This may have changed:  Another medication with the same name was removed. Continue taking this medication, and follow the directions you see here.   Used for:  Type 2 diabetes mellitus with diabetic peripheral angiopathy without gangrene, without long-term current use of insulin (H)   Changed by:  Allan Casey MD        Use to test blood  sugar 3 times daily or as directed.   Quantity:  100 each   Refills:  PRN       hydrochlorothiazide 12.5 MG capsule   Commonly known as:  MICROZIDE   This may have changed:  additional instructions   Used for:  Essential hypertension with goal blood pressure less than 130/80        Dose:  12.5 mg   Take 1 capsule (12.5 mg) by mouth daily   Quantity:  90 capsule   Refills:  3       Phenytoin Sodium Extended 200 MG Caps   This may have changed:  additional instructions   Used for:  Seizure disorder (H)        Dose:  200 mg   Take 200 mg by mouth 2 times daily   Quantity:  60 capsule   Refills:  0       * Notice:  This list has 2 medication(s) that are the same as other medications prescribed for you. Read the directions carefully, and ask your doctor or other care provider to review them with you.         Where to get your medicines      These medications were sent to [facility] [address] [address]     Phone:  [phone]     albuterol 108 (90 BASE) MCG/ACT Inhaler                Primary Care Provider Office Phone # Fax #    Allan Casey [phone]       [address] [place] [phone]        Equal Access to Services     KARI CARREON : Hadii shaheen ku hadasho Sostephali, waaxda luqadaha, qaybta kaalmada adeegyada, tonie marroquin. So [facility] [phone].    ATENCIÓN: Si habla español, tiene a briones disposición servicios gratuitos de asistencia lingüística. [facility] [phone].    We comply with applicable federal civil rights laws and [place] laws. We do not discriminate on the basis of race, color, national origin, age, disability, sex, sexual orientation, or gender identity.            Thank you!     Thank you for choosing [facility]  for your care. Our goal is always to provide you with excellent care. Hearing back from our patients is one way we can continue to  improve our services. Please take a few minutes to complete the written survey that you may receive in the mail after your visit with us. Thank you!             Your Updated Medication List - Protect others around you: Learn how to safely use, store and throw away your medicines at www.disposemymeds.org.          This list is accurate as of: 10/23/17 12:07 PM.  Always use your most recent med list.                   Brand Name Dispense Instructions for use Diagnosis    ACETAMINOPHEN PO      Take 1,000 mg by mouth every 6 hours as needed for fever Taking q4-6 hours, per MAR        ADVAIR DISKUS 250-50 MCG/DOSE diskus inhaler   Generic drug:  fluticasone-salmeterol      Inhale 1 puff into the lungs 2 times daily        * albuterol (2.5 MG/3ML) 0.083% neb solution     360 mL    INHALE 1 VIAL VIA NEBULIZER EVERY 6 HOURS AS NEEDED    Chronic obstructive pulmonary disease, unspecified COPD type (H)       * albuterol 108 (90 BASE) MCG/ACT Inhaler    PROAIR HFA/PROVENTIL HFA/VENTOLIN HFA    3 Inhaler    Inhale 2 puffs into the lungs every 6 hours as needed for shortness of breath / dyspnea or wheezing    JOSE (obstructive sleep apnea)       aspirin 81 MG EC tablet     90 tablet    Take 1 tablet (81 mg) by mouth daily    Unstable angina (H)       atorvastatin 40 MG tablet    LIPITOR    90 tablet    Take 1 tablet (40 mg) by mouth daily    Hyperlipidemia LDL goal <100       blood glucose monitoring lancets     100 each    Use to test blood sugar 3 times daily or as directed.    Type 2 diabetes mellitus with diabetic peripheral angiopathy without gangrene, without long-term current use of insulin (H)       blood glucose monitoring meter device kit     1 kit    Use to test blood sugars 3 times daily or as directed.    Type 2 diabetes, HbA1C goal < 8% (H)       blood glucose monitoring test strip    ONE TOUCH ULTRA    3 Box    Use to test blood sugars 3 times daily or as directed.    Type 2 diabetes mellitus with diabetic  peripheral angiopathy without gangrene, without long-term current use of insulin (H)       calcium citrate-vitamin D 315-250 MG-UNIT Tabs per tablet    CITRACAL    120 tablet    Take 2 tablets by mouth daily    Osteoporosis       cetirizine 10 MG tablet    zyrTEC    90 tablet    Take 1 tablet (10 mg) by mouth daily as needed for allergies    Seasonal allergic rhinitis, unspecified allergic rhinitis trigger       CYANOCOBALAMIN PO      Take 2,000 mcg by mouth daily        cyclobenzaprine 10 MG tablet    FLEXERIL    30 tablet    Take 1 tablet (10 mg) by mouth 2 times daily as needed for muscle spasms    Cervicalgia       diclofenac 1 % Gel topical gel    VOLTAREN    100 g    Apply 2 g topically 4 times daily to hands    Primary osteoarthritis of both hands       dorzolamide 2 % ophthalmic solution    TRUSOPT     Place 1 drop into both eyes 2 times daily        EPINEPHrine 0.15 MG/0.3ML injection 2-pack    EPIPEN JR    0.6 mL    Inject 0.3 mLs (0.15 mg) into the muscle as needed for anaphylaxis    Bee sting reaction, undetermined intent, subsequent encounter       ESCITALOPRAM OXALATE PO      Take 10 mg by mouth daily        estradiol 1 MG tablet    ESTRACE    90 tablet    Take 1 tablet (1 mg) by mouth daily    Person who has had sex change operation       ferrous sulfate 325 (65 FE) MG tablet    IRON    60 tablet    Take 1 tablet (325 mg) by mouth 2 times daily With meals        fluticasone 50 MCG/ACT spray    FLONASE     Spray 1 spray into both nostrils daily        hydrochlorothiazide 12.5 MG capsule    MICROZIDE    90 capsule    Take 1 capsule (12.5 mg) by mouth daily    Essential hypertension with goal blood pressure less than 130/80       INCRUSE ELLIPTA 62.5 MCG/INH oral inhaler   Generic drug:  umeclidinium      Inhale 1 puff into the lungs daily        RENÉE Pack      Take 1 packet by mouth 2 times daily        latanoprost 0.005 % ophthalmic solution    XALATAN    2.5 mL    Place 1 drop into both eyes At  Bedtime    Primary open angle glaucoma, stage unspecified       levETIRAcetam 500 MG tablet    KEPPRA    180 tablet    Take 1 tablet (500 mg) by mouth 2 times daily    Nausea       lidocaine 5 % Patch    LIDODERM    30 patch    APPLY 1 TO 3 PATCHES TO PAINFUL AREA AT ONCE FOR UP TO 12 HOURS WITHIN A 24 HOUR PERIOD REMOVE AFTER 12 HOURS    Chronic pain syndrome, 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 (H)       lisinopril 10 MG tablet    PRINIVIL/ZESTRIL    90 tablet    Take 1 tablet (10 mg) by mouth daily    Essential hypertension with goal blood pressure less than 130/80       MEDICATION GIVEN BY INTRATHECAL PUMP - INSTRUCTION      continuous May 19, 2017 - per [facility] in [place] [phone]: Conc: Bupivacaine 20 mg/mL and Fentanyl 2000 mcg/mL. Continuous: Bupivacaine 7.265 mg/day and Fentanyl 726.5 mcg/day. Boluses: Up to 7 boluses per 24-hr period of Bupivicaine 0.599 mg and Fentanyl 59.9 mcg  Pump Refill Date at [facility]: 7/2/17        metoprolol 25 MG 24 hr tablet    TOPROL-XL    30 tablet    TAKE 1 TABLET (25 MG) BY MOUTH DAILY    Old myocardial infarction       MULTIVITAMIN PO      Take 1 tablet by mouth daily        nitroGLYcerin 0.4 MG sublingual tablet    NITROSTAT    25 tablet    Place 1 tablet (0.4 mg) under the tongue every 5 minutes as needed for chest pain if you are still having symptoms after 3 doses (15 minutes) call 911.    Chronic systolic congestive heart failure (H), Old myocardial infarction       ONDANSETRON PO      Take 4 mg by mouth 3 times daily        * order for DME     1 Month    Equipment being ordered: Depends briefs    Incontinence       * order for DME     1 Device    Equipment being ordered: Power Wheelchair    CVA (cerebral infarction), HTN (hypertension)       * order for DME     1 Box    Equipment being ordered: Glucerna daily shakes with each meal    Type 2 diabetes mellitus with other diabetic neurological complication        * order for DME     1 Units    Equipment being ordered: Nebulizer and tubing supplies    Simple chronic bronchitis (H)       * order for DME     1 Device    Equipment being ordered: CPAP supplies mask, hose, filters, cushion fax to [facility] at [phone] Disp: 10 DeviceRefills: prn Class: Local PrintStart: 2/10/2017    Chronic obstructive pulmonary disease, unspecified COPD type (H)       * order for DME     10 Device    Equipment being ordered: CPAP supplies mask, hose, filters, cushion  fax to [facility] at [phone]    COPD (chronic obstructive pulmonary disease) (H)       * order for DME     1 Device    Hospital bed with side rails    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 (H)       * order for DME     1 Device    Full electric hospital bed with half rails  Dx: D81280, I110, J449 Length of need: lifetime    Status post bilateral above knee amputation (H)       * order for DME     1 Device    Wheel Chair Cushion: 18 x 18 inch Roho cushion    Status post bilateral above knee amputation (H)       pantoprazole 40 MG EC tablet    PROTONIX     Take 40 mg by mouth daily        Phenytoin Sodium Extended 200 MG Caps     60 capsule    Take 200 mg by mouth 2 times daily    Seizure disorder (H)       polyethylene glycol Packet    MIRALAX/GLYCOLAX     Take 17 g by mouth daily Dissolved in water or juice        * pregabalin 75 MG capsule    LYRICA    120 capsule    Take 2 capsules (150 mg) by mouth 2 times daily    Pain in both upper extremities       * LYRICA 100 MG capsule   Generic drug:  pregabalin           prochlorperazine 10 MG tablet    COMPAZINE    20 tablet    Take 1 tablet (10 mg) by mouth every 8 hours as needed    Nausea with vomiting       risperiDONE 0.5 MG tablet    risperDAL     Take 0.5 mg by mouth At Bedtime        sucralfate 1 GM tablet    CARAFATE    40 tablet    Take 1 tablet (1 g) by mouth 4 times daily May dissolve in 10 mL water is necessary. (Start upon  completion of carafate suspension)    Gastroesophageal reflux disease without esophagitis       * traZODone 100 MG tablet    DESYREL    90 tablet    Take 1.5 tablets (150 mg) by mouth At Bedtime    Anxiety state       * traZODone 50 MG tablet    DESYREL          vitamin D 2000 UNITS tablet     100 tablet    Take 2,000 Units by mouth daily.        zinc 50 MG Tabs      Take 1 tablet by mouth daily        * Notice:  This list has 15 medication(s) that are the same as other medications prescribed for you. Read the directions carefully, and ask your doctor or other care provider to review them with you.

## 2017-10-24 ENCOUNTER — TRANSFERRED RECORDS (OUTPATIENT)
Dept: HEALTH INFORMATION MANAGEMENT | Facility: CLINIC | Age: 67
End: 2017-10-24

## 2017-10-24 DIAGNOSIS — F43.21 ADJUSTMENT DISORDER WITH DEPRESSED MOOD: Primary | ICD-10-CM

## 2017-10-24 LAB — PHENYTOIN FREE SERPL-MCNC: 0.94 UG/ML

## 2017-10-24 RX ORDER — ESCITALOPRAM OXALATE 5 MG/1
10 TABLET ORAL DAILY
Qty: 60 TABLET | Refills: 5 | Status: ON HOLD | OUTPATIENT
Start: 2017-10-24 | End: 2018-01-16

## 2017-10-24 RX ORDER — SUCRALFATE 1 G/1
1 TABLET ORAL 4 TIMES DAILY
Qty: 120 TABLET | Refills: 11 | Status: SHIPPED | OUTPATIENT
Start: 2017-10-24 | End: 2019-06-10

## 2017-10-24 ASSESSMENT — ASTHMA QUESTIONNAIRES: ACT_TOTALSCORE: 12

## 2017-10-24 NOTE — TELEPHONE ENCOUNTER
ESCITALOPRAM OXALATE PO     Last Written Prescription Date: historical  Last Fill Quantity: , # refills:   Last Office Visit with [facility] primary care provider:  10/03/2017        Last PHQ-9 score on record=   PHQ-9 SCORE 3/6/2017   Total Score -   Total Score 9

## 2017-10-24 NOTE — TELEPHONE ENCOUNTER
It is ordered for 2 5mgs to equal 10.  You can change it to one 10 if that is what you want.  It is listed as historical, so I am unable to do anything with it.

## 2017-10-24 NOTE — TELEPHONE ENCOUNTER
sucralfate (CARAFATE) 1 GM tablet      Last Written Prescription Date: 2/21/2017  Last Fill Quantity: 40 tablet,  # refills: 5   Last Office Visit with [facility], [facility] or [facility] prescribing provider: 10/03/2017

## 2017-10-25 ENCOUNTER — ALLIED HEALTH/NURSE VISIT (OUTPATIENT)
Dept: GASTROENTEROLOGY | Facility: CLINIC | Age: 67
End: 2017-10-25

## 2017-10-25 DIAGNOSIS — E11.9 DIABETES MELLITUS (H): ICD-10-CM

## 2017-10-25 DIAGNOSIS — R13.10 DYSPHAGIA: ICD-10-CM

## 2017-10-25 DIAGNOSIS — Z71.3 NUTRITIONAL COUNSELING: Primary | ICD-10-CM

## 2017-10-25 DIAGNOSIS — K21.9 ESOPHAGEAL REFLUX: ICD-10-CM

## 2017-10-25 NOTE — MR AVS SNAPSHOT
After Visit Summary   10/25/2017    Sonya Foote    MRN: [id number]           Patient Information     Date Of Birth          1950        Visit Information        Provider Department      10/25/2017 1:30 PM Melonie Guerrero RD [facility]        Care Instructions    It was nice seeing you again today:  1. Continuing trying to eat 4-6 smaller balanced meals per day.    2. Continue drinking 2 glucerna shakes per day (these can be counted as smaller meals).    3. Follow soft diet per speech therapist and MD recommendations. Can try yogurt, cottage cheese, canned fruit, cooked vegetables for example.    4. Decrease caffeinated drinks down to 1-2 cups per day.  5. Continue to chew food well before swallowing.  If you would like to schedule a follow up appointment with Melonie Guerrero, Registered Dietitian, please call [phone].            Follow-ups after your visit        Your next 10 appointments already scheduled     Oct 26, 2017  9:50 AM CDT   US CAROTID BILATERAL with SHUS1   [facility] Ultrasound ([facility])    [address] [place] [address] [phone]           Please bring a list of your medicines (including vitamins, minerals and over-the-counter drugs). Also, tell your doctor about any allergies you may have. Wear comfortable clothes and leave your valuables at home.  You do not need to do anything special to prepare for your exam.  Please call the Imaging Department at your exam site with any questions.            Nov 02, 2017  9:15 AM CDT   RETURN GLAUCOMA with Marely Robin MD   Eye Clinic ([facility])    Kwan Redman [facility]  9[facility]a  [facility] [address]   [phone]            Nov 06, 2017  8:30 AM CST   (Arrive by 8:15 AM)   PAC EVALUATION with  Pac Bernice [address] [place] [address] Preoperative Assessment Center ([facility])    [address]  [facility] [address]   [phone]            Nov 06, 2017  9:30 AM CST   (Arrive by 9:15 AM)   PAC RN ASSESSMENT with Uc Pac Rn   [facility] Preoperative Assessment Center ([facility])    [address] [place] [address]   [phone]            Nov 06, 2017 10:10 AM CST   (Arrive by 9:55 AM)   PAC Anesthesia Consult with Uc Pac Anesthesiologist   [facility] Preoperative Assessment Center ([facility])    [address] [place] [address]   [phone]            Nov 10, 2017  9:20 AM CST   (Arrive by 9:05 AM)   RETURN DIABETES with Michelle Irizarry MD   [facility] Endocrinology ([facility])    [address] [place] [id number] [phone]            Nov 13, 2017   Procedure with [place] Oliver MD   [facility] [place] Same Day Surgery (--)    [address] [facility] [address] [phone]            Dec 14, 2017  1:45 PM CST   (Arrive by 1:30 PM)   Post-Op with [place] Oliver MD   [facility] Urology and Inst for Prostate and Urologic Cancers ([facility])    [address] [place] [address] [phone]            Dec 29, 2017 10:00 AM CST   (Arrive by 9:45 AM)   New Patient Visit with VICTOR M Floyd CNP   [facility] Gastroenterology and IBD Clinic ([facility])    [address] [place] [id number] [phone]            Feb 19, 2018  9:30 AM CST   (Arrive by 9:15 AM)   Return Visit with Alina Jennings MD   [facility] ([facility])    [address] [place] [id number] [phone]              Who to contact     Please call your clinic at [phone] to:    Ask questions about your health    Make or cancel appointments    Discuss your  medicines    Learn about your test results    Speak to your doctor   If you have compliments or concerns about an experience at your clinic, or if you wish to file a complaint, please contact [facility] Patient Relations at [phone] or email us at [email or web address]         Additional Information About Your Visit        [employer] Information     [email or web address] is an electronic gateway that provides easy, online access to your medical records. With [employer], you can request a clinic appointment, read your test results, renew a prescription or communicate with your care team.     To sign up for [email or web address] visit the website at [email or web address]   You will be asked to enter the access code listed below, as well as some personal information. Please follow the directions to create your username and password.     Your access code is: [email or web address]  Expires: 2018 12:09 PM     Your access code will  in 90 days. If you need help or a new code, please contact your [facility] Clinic or call [phone] for assistance.        Care EveryWhere ID     This is your Care EveryWhere ID. This could be used by other organizations to access your [place] medical records  [id number]         Blood Pressure from Last 3 Encounters:   10/23/17 140/80   10/18/17 108/62   10/11/17 155/79    Weight from Last 3 Encounters:   10/23/17 61.2 kg (135 lb)   10/18/17 61.2 kg (135 lb)   10/10/17 72.7 kg (160 lb 3.2 oz)              Today, you had the following     No orders found for display         Today's Medication Changes          These changes are accurate as of: 10/25/17  2:21 PM.  If you have any questions, ask your nurse or doctor.               These medicines have changed or have updated prescriptions.        Dose/Directions    aspirin 81 MG EC tablet   This may have changed:  when to take this   Used for:  Unstable angina (H)        Dose:  81 mg   Take 1 tablet (81  mg) by mouth daily   Quantity:  90 tablet   Refills:  3       atorvastatin 40 MG tablet   Commonly known as:  LIPITOR   This may have changed:  when to take this   Used for:  Hyperlipidemia LDL goal <100        Dose:  40 mg   Take 1 tablet (40 mg) by mouth daily   Quantity:  90 tablet   Refills:  3       hydrochlorothiazide 12.5 MG capsule   Commonly known as:  MICROZIDE   This may have changed:  additional instructions   Used for:  Essential hypertension with goal blood pressure less than 130/80        Dose:  12.5 mg   Take 1 capsule (12.5 mg) by mouth daily   Quantity:  90 capsule   Refills:  3       Phenytoin Sodium Extended 200 MG Caps   This may have changed:  additional instructions   Used for:  Seizure disorder (H)        Dose:  200 mg   Take 200 mg by mouth 2 times daily   Quantity:  60 capsule   Refills:  0                Primary Care Provider Office Phone # Fax #    Allan Casey [phone]       [address] [place] [phone]        Equal Access to Services     [facility] : Hadii shaheen gastelum hadasho Soomaali, waaxda luqadaha, qaybta kaalmada adeegyada, waxay francis marvin . So [facility] [phone].    ATENCIÓN: Si habla español, tiene a briones disposición servicios gratuitos de asistencia lingüística. [facility] [phone].    We comply with applicable federal civil rights laws and [place] laws. We do not discriminate on the basis of race, color, national origin, age, disability, sex, sexual orientation, or gender identity.            Thank you!     Thank you for choosing [facility]  for your care. Our goal is always to provide you with excellent care. Hearing back from our patients is one way we can continue to improve our services. Please take a few minutes to complete the written survey that you may receive in the mail after your visit with us. Thank you!             Your Updated Medication List - Protect others around you: Learn  how to safely use, store and throw away your medicines at www.disposemymeds.org.          This list is accurate as of: 10/25/17  2:21 PM.  Always use your most recent med list.                   Brand Name Dispense Instructions for use Diagnosis    ACETAMINOPHEN PO      Take 1,000 mg by mouth every 6 hours as needed for fever Taking q4-6 hours, per MAR        ADVAIR DISKUS 250-50 MCG/DOSE diskus inhaler   Generic drug:  fluticasone-salmeterol      Inhale 1 puff into the lungs 2 times daily        * albuterol (2.5 MG/3ML) 0.083% neb solution     360 mL    INHALE 1 VIAL VIA NEBULIZER EVERY 6 HOURS AS NEEDED    Chronic obstructive pulmonary disease, unspecified COPD type (H)       * albuterol 108 (90 BASE) MCG/ACT Inhaler    PROAIR HFA/PROVENTIL HFA/VENTOLIN HFA    3 Inhaler    Inhale 2 puffs into the lungs every 6 hours as needed for shortness of breath / dyspnea or wheezing    JOSE (obstructive sleep apnea)       aspirin 81 MG EC tablet     90 tablet    Take 1 tablet (81 mg) by mouth daily    Unstable angina (H)       atorvastatin 40 MG tablet    LIPITOR    90 tablet    Take 1 tablet (40 mg) by mouth daily    Hyperlipidemia LDL goal <100       blood glucose monitoring lancets     100 each    Use to test blood sugar 3 times daily or as directed.    Type 2 diabetes mellitus with diabetic peripheral angiopathy without gangrene, without long-term current use of insulin (H)       blood glucose monitoring meter device kit     1 kit    Use to test blood sugars 3 times daily or as directed.    Type 2 diabetes, HbA1C goal < 8% (H)       blood glucose monitoring test strip    ONE TOUCH ULTRA    3 Box    Use to test blood sugars 3 times daily or as directed.    Type 2 diabetes mellitus with diabetic peripheral angiopathy without gangrene, without long-term current use of insulin (H)       calcium citrate-vitamin D 315-250 MG-UNIT Tabs per tablet    CITRACAL    120 tablet    Take 2 tablets by mouth daily    Osteoporosis        cetirizine 10 MG tablet    zyrTEC    90 tablet    Take 1 tablet (10 mg) by mouth daily as needed for allergies    Seasonal allergic rhinitis, unspecified allergic rhinitis trigger       CYANOCOBALAMIN PO      Take 2,000 mcg by mouth daily        cyclobenzaprine 10 MG tablet    FLEXERIL    30 tablet    Take 1 tablet (10 mg) by mouth 2 times daily as needed for muscle spasms    Cervicalgia       diclofenac 1 % Gel topical gel    VOLTAREN    100 g    Apply 2 g topically 4 times daily to hands    Primary osteoarthritis of both hands       dorzolamide 2 % ophthalmic solution    TRUSOPT     Place 1 drop into both eyes 2 times daily        EPINEPHrine 0.15 MG/0.3ML injection 2-pack    EPIPEN JR    0.6 mL    Inject 0.3 mLs (0.15 mg) into the muscle as needed for anaphylaxis    Bee sting reaction, undetermined intent, subsequent encounter       escitalopram 5 MG tablet    LEXAPRO    60 tablet    Take 2 tablets (10 mg) by mouth daily    Adjustment disorder with depressed mood       estradiol 1 MG tablet    ESTRACE    90 tablet    Take 1 tablet (1 mg) by mouth daily    Person who has had sex change operation       ferrous sulfate 325 (65 FE) MG tablet    IRON    60 tablet    Take 1 tablet (325 mg) by mouth 2 times daily With meals        fluticasone 50 MCG/ACT spray    FLONASE     Spray 1 spray into both nostrils daily        hydrochlorothiazide 12.5 MG capsule    MICROZIDE    90 capsule    Take 1 capsule (12.5 mg) by mouth daily    Essential hypertension with goal blood pressure less than 130/80       INCRUSE ELLIPTA 62.5 MCG/INH oral inhaler   Generic drug:  umeclidinium      Inhale 1 puff into the lungs daily        RENÉE Pack      Take 1 packet by mouth 2 times daily        latanoprost 0.005 % ophthalmic solution    XALATAN    2.5 mL    Place 1 drop into both eyes At Bedtime    Primary open angle glaucoma, stage unspecified       levETIRAcetam 500 MG tablet    KEPPRA    180 tablet    Take 1 tablet (500 mg) by mouth 2 times  daily    Nausea       lidocaine 5 % Patch    LIDODERM    30 patch    APPLY 1 TO 3 PATCHES TO PAINFUL AREA AT ONCE FOR UP TO 12 HOURS WITHIN A 24 HOUR PERIOD REMOVE AFTER 12 HOURS    Chronic pain syndrome, 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 (H)       lisinopril 10 MG tablet    PRINIVIL/ZESTRIL    90 tablet    Take 1 tablet (10 mg) by mouth daily    Essential hypertension with goal blood pressure less than 130/80       MEDICATION GIVEN BY INTRATHECAL PUMP - INSTRUCTION      continuous May 19, 2017 - per [facility] [place] [phone]: Conc: Bupivacaine 20 mg/mL and Fentanyl 2000 mcg/mL. Continuous: Bupivacaine 7.265 mg/day and Fentanyl 726.5 mcg/day. Boluses: Up to 7 boluses per 24-hr period of Bupivicaine 0.599 mg and Fentanyl 59.9 mcg  Pump Refill Date at [facility]: 7/2/17        metoprolol 25 MG 24 hr tablet    TOPROL-XL    30 tablet    TAKE 1 TABLET (25 MG) BY MOUTH DAILY    Old myocardial infarction       MULTIVITAMIN PO      Take 1 tablet by mouth daily        nitroGLYcerin 0.4 MG sublingual tablet    NITROSTAT    25 tablet    Place 1 tablet (0.4 mg) under the tongue every 5 minutes as needed for chest pain if you are still having symptoms after 3 doses (15 minutes) call 911.    Chronic systolic congestive heart failure (H), Old myocardial infarction       ONDANSETRON PO      Take 4 mg by mouth 3 times daily        * order for DME     1 Month    Equipment being ordered: Depends briefs    Incontinence       * order for DME     1 Device    Equipment being ordered: Power Wheelchair    CVA (cerebral infarction), HTN (hypertension)       * order for DME     1 Box    Equipment being ordered: Glucerna daily shakes with each meal    Type 2 diabetes mellitus with other diabetic neurological complication       * order for DME     1 Units    Equipment being ordered: Nebulizer and tubing supplies    Simple chronic bronchitis (H)       * order for DME     1  Device    Equipment being ordered: CPAP supplies mask, hose, filters, cushion fax to [facility] at [phone] Disp: 10 DeviceRefills: prn Class: Local PrintStart: 2/10/2017    Chronic obstructive pulmonary disease, unspecified COPD type (H)       * order for DME     10 Device    Equipment being ordered: CPAP supplies mask, hose, filters, cushion  fax to [facility] at [phone]    COPD (chronic obstructive pulmonary disease) (H)       * order for DME     1 Device    Hospital bed with side rails    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 (H)       * order for DME     1 Device    Full electric hospital bed with half rails  Dx: U41569, I110, J449 Length of need: lifetime    Status post bilateral above knee amputation (H)       * order for DME     1 Device    Wheel Chair Cushion: 18 x 18 inch Roho cushion    Status post bilateral above knee amputation (H)       pantoprazole 40 MG EC tablet    PROTONIX     Take 40 mg by mouth daily        Phenytoin Sodium Extended 200 MG Caps     60 capsule    Take 200 mg by mouth 2 times daily    Seizure disorder (H)       polyethylene glycol Packet    MIRALAX/GLYCOLAX     Take 17 g by mouth daily Dissolved in water or juice        * pregabalin 75 MG capsule    LYRICA    120 capsule    Take 2 capsules (150 mg) by mouth 2 times daily    Pain in both upper extremities       * LYRICA 100 MG capsule   Generic drug:  pregabalin           prochlorperazine 10 MG tablet    COMPAZINE    20 tablet    Take 1 tablet (10 mg) by mouth every 8 hours as needed    Nausea with vomiting       risperiDONE 0.5 MG tablet    risperDAL     Take 0.5 mg by mouth At Bedtime        sucralfate 1 GM tablet    CARAFATE    120 tablet    Take 1 tablet (1 g) by mouth 4 times daily May dissolve in 10 mL water is necessary. (Start upon completion of carafate suspension)    Adjustment disorder with depressed mood       * traZODone 100 MG tablet    DESYREL    90 tablet    Take 1.5 tablets (150  mg) by mouth At Bedtime    Anxiety state       * traZODone 50 MG tablet    DESYREL          vitamin D 2000 UNITS tablet     100 tablet    Take 2,000 Units by mouth daily.        zinc 50 MG Tabs      Take 1 tablet by mouth daily        * Notice:  This list has 15 medication(s) that are the same as other medications prescribed for you. Read the directions carefully, and ask your doctor or other care provider to review them with you.

## 2017-10-25 NOTE — PATIENT INSTRUCTIONS
It was nice seeing you again today:  1. Continuing trying to eat 4-6 smaller balanced meals per day.    2. Continue drinking 2 glucerna shakes per day (these can be counted as smaller meals).    3. Follow soft diet per speech therapist and MD recommendations. Can try yogurt, cottage cheese, canned fruit, cooked vegetables for example.    4. Decrease caffeinated drinks down to 1-2 cups per day.  5. Continue to chew food well before swallowing.  If you would like to schedule a follow up appointment with Melonie Guerrero Registered Dietitian, please call [phone].

## 2017-10-25 NOTE — PROGRESS NOTES
"[facility] Outpatient Medical Nutrition Therapy      Time Spent:  60 minutes  Session Type:  Individual Session  Referring Physician:  Monserrat Polk CNP  Reason for RD Visit:   Follow up for GERD, Dysphagia, diabetes, nutrition counseling     Nutrition Assessment:  Patient is here for follow up visit with Registered Dietitian (RD).  Patient is a 67 y.o. female with history of GERD, dysphagia and per SLP recommended dysphagia level 3 diet with thin liquids, no straws, s/p R-BKA and L-AKA per patient and diabetes. Patient stated that she continues to have decreased appetite but a little better than previous visit. Was recently in the hospital with UTI and kidney infection. Will sometimes skips several meals per day and other days eats 3 smaller meals and at least 2 glucerna shakes per day. Aim for 3 shakes per  Day. Smokes 3 cigarettes per day. Quit for 16 years but then restarted a couple of months ago. Lives in an care facility so staff encourages patient to eat.    Diet Recall:  (Yesterday) eats very little, no appetite and food does not taste good to her anymore. Does not like many vegetables unless in soup or with cheese sauce. Feels tired a lot working with PT and OT. Some days will skip all meals. Usually has 2-3 glucerna per day.   Meal Food    Breakfast Oatmeal with cream and sugar OR Eggs and glucerna   Lunch 1/2 Grilled cheese sandwich, fruit    Dinner 1/2 grilled cheese   Snacks none   Beverages 3 glucerna shakes, 2-3 glasses cranberry juice or OJ, 64 oz at least, 8-32 oz black tea, herbal tea, 3 cans SF energy drinks.    Alcohol Intake denies        Height:   Ht Readings from Last 1 Encounters:   10/18/17 1.702 m (5' 7\")     Weight:  Per EMR had weight fluctuation but overall no significant change over past few months.  Wt Readings from Last 10 Encounters:   10/23/17 61.2 kg (135 lb)   10/18/17 61.2 kg (135 lb)   10/10/17 72.7 kg (160 lb 3.2 oz)   10/03/17 67.1 kg (148 lb)   09/08/17 67.1 kg (148 lb) "   09/06/17 60.3 kg (133 lb)   08/28/17 60.3 kg (133 lb)   08/24/17 60.3 kg (133 lb)   08/13/17 60.3 kg (133 lb)   07/25/17 60.3 kg (133 lb)      BMI: 20.87 healthy    Labs:  On 10/11/2017 decreased urea nitrogen (6), creatinine (0.51), hemoglobin (10.9), and hematocrit (33.4). Others reviewed.  Pertinent Medications/vitamin and mineral supplements:   Some included are Sucralfate, lexapro, zofran, desirae, trazodone.   Food Allergies:  NKFA    Estimated Nutrition Needs based on initial body weight of 63 kg (138#):   2708-2285 calories (25-30 kcals/kg -~15% less due to bilateral amputation), 63-76g protein (1-1.2g/kg), 1890 ml fluids (~30 ml/kgor total fluids per MD).     MALNUTRITION:  % Weight Loss:  Weight loss does not meet criteria for malnutrition   % Intake:  <75% for >/= 1 month (non-severe malnutrition)  Subcutaneous Fat Loss:  None observed  Muscle Loss:  None observed  Fluid Retention:  None noted    Malnutrition Diagnosis: Patient does not meet two of the above criteria necessary for diagnosing malnutrition In Context of:  Chronic illness or disease    Previous Nutrition Diagnosis:    Altered GI function related to medical dx, swallowing difficulty, decreased appetite, nausea and occasional vomiting as evidenced by patient report, hx GERD, dysphagia on mechanically altered diet in assisted living.-Ongoing    Nutrition Prescription: Recommended general healthful diet of 4-6 smaller balanced meals +2 glucerna shakes per day (shakes can count for 2 smaller meals/snacks per day)    Nutrition Intervention:    Nutrition Education/Counseling:  Discussed any dietary changes patient has been working on over the past month. Discussed tips and suggestions for meals and snacks. Encouraged patient to eat 4-6 smaller more balanced meals per day. Continue 2 Glucerna shakes per day. Told patient if having a day where unable to eat anything, then aim for at least drinking 3 shakes per day. Discussed dysphagia level 3 diet  and food recommendations and diet tips.     Provided review of diet education with tips and suggestions for GERD including recommended foods (as tolerated) and foods not recommended if causing symptoms such as chocolate, caffeine, peppermint and spearmint, alcohol, spicy foods and fatty foods as well as recommendation not to smoke. Recommended decreasing caffeinated drinks such as energy drink and maryse grey tea to limit to 1-2 cups per day. Discussed lifestyle recommendations such as not eating 2-3 hours before bedtime or lying down. Gave patient diet education handout.    Educational Materials Provided:  Nutrition care manual: GERD nutrition therapy and Dysphagia level 3 diet handout.    Patient verbalized understanding of education provided. See all recommendations under Goals below.     Goals:  1. Continuing trying to eat 4-6 smaller balanced meals per day.  2. Continue drinking 2 glucerna shakes per day (these can be counted as smaller meals).  3. Follow soft diet per speech therapist and MD recommendations. Can try yogurt, cottage cheese, canned fruit, cooked vegetables for example.  4. Decrease caffeinated drinks down to 1-2 cups per day.  5. Continue to chew food well before swallowing.    Nutrition Monitoring and Evaluation: Will monitor adherence to nutrition recommendations at future RD visits.     Further Medical Nutrition Therapy:  Recommended/PRN  Next Appointment (if applicable):  PRN. Patient considering and planning to call to schedule  Patient was encouraged to call/contact RD with any further questions.    Melonie Guerrero, MS, RD, LD

## 2017-10-26 ENCOUNTER — TELEPHONE (OUTPATIENT)
Dept: INTERNAL MEDICINE | Facility: CLINIC | Age: 67
End: 2017-10-26

## 2017-10-26 ENCOUNTER — HOSPITAL ENCOUNTER (OUTPATIENT)
Dept: ULTRASOUND IMAGING | Facility: CLINIC | Age: 67
Discharge: HOME | End: 2017-10-26
Attending: NURSE PRACTITIONER | Admitting: NURSE PRACTITIONER
Payer: COMMERCIAL

## 2017-10-26 DIAGNOSIS — I65.29 CAROTID STENOSIS: ICD-10-CM

## 2017-10-26 PROCEDURE — 93880 EXTRACRANIAL BILAT STUDY: CPT

## 2017-10-26 NOTE — TELEPHONE ENCOUNTER
Home care nurse called to get SN orders for 1w 9 and 3 PRN for management of suprapubic catheter, education and prevention of UTI, skin assessment, and medication management.     Writer provided verbal okay for orders.    EDWARDO Castillo

## 2017-11-01 DIAGNOSIS — N39.0 UTI (URINARY TRACT INFECTION): Primary | ICD-10-CM

## 2017-11-02 ENCOUNTER — OFFICE VISIT (OUTPATIENT)
Dept: OPHTHALMOLOGY | Facility: CLINIC | Age: 67
End: 2017-11-02
Attending: OPHTHALMOLOGY
Payer: COMMERCIAL

## 2017-11-02 ENCOUNTER — ANESTHESIA EVENT (OUTPATIENT)
Dept: SURGERY | Facility: CLINIC | Age: 67
End: 2017-11-02

## 2017-11-02 DIAGNOSIS — H40.1133 PRIMARY OPEN ANGLE GLAUCOMA OF BOTH EYES, SEVERE STAGE: Primary | ICD-10-CM

## 2017-11-02 DIAGNOSIS — Z96.1 PSEUDOPHAKIA OF RIGHT EYE: ICD-10-CM

## 2017-11-02 PROCEDURE — 99213 OFFICE O/P EST LOW 20 MIN: CPT | Mod: ZF

## 2017-11-02 RX ORDER — DORZOLAMIDE HYDROCHLORIDE AND TIMOLOL MALEATE 20; 5 MG/ML; MG/ML
1 SOLUTION/ DROPS OPHTHALMIC 2 TIMES DAILY
Qty: 1 BOTTLE | Refills: 11 | Status: SHIPPED | OUTPATIENT
Start: 2017-11-02 | End: 2018-01-04

## 2017-11-02 RX ORDER — BRIMONIDINE TARTRATE 2 MG/ML
1 SOLUTION/ DROPS OPHTHALMIC 3 TIMES DAILY
Qty: 15 ML | Refills: 11 | Status: ON HOLD | OUTPATIENT
Start: 2017-11-02 | End: 2018-02-09

## 2017-11-02 RX ORDER — LATANOPROST 50 UG/ML
1 SOLUTION/ DROPS OPHTHALMIC AT BEDTIME
Qty: 1 BOTTLE | Refills: 11 | Status: SHIPPED | OUTPATIENT
Start: 2017-11-02 | End: 2017-11-06 | Stop reason: ALTCHOICE

## 2017-11-02 ASSESSMENT — VISUAL ACUITY
OD_CC+: -2
METHOD: SNELLEN - LINEAR
OD_CC: 20/70
OS_CC: NLP

## 2017-11-02 ASSESSMENT — EXTERNAL EXAM - RIGHT EYE: OD_EXAM: NORMAL

## 2017-11-02 ASSESSMENT — SLIT LAMP EXAM - LIDS
COMMENTS: NORMAL
COMMENTS: NORMAL

## 2017-11-02 ASSESSMENT — CONF VISUAL FIELD
OS_INFERIOR_NASAL_RESTRICTION: 1
OS_SUPERIOR_TEMPORAL_RESTRICTION: 1
OS_INFERIOR_TEMPORAL_RESTRICTION: 1
OS_SUPERIOR_NASAL_RESTRICTION: 1

## 2017-11-02 ASSESSMENT — TONOMETRY
OD_IOP_MMHG: 12
IOP_METHOD: APPLANATION
OS_IOP_MMHG: 10

## 2017-11-02 ASSESSMENT — EXTERNAL EXAM - LEFT EYE: OS_EXAM: NORMAL

## 2017-11-02 NOTE — NURSING NOTE
Chief Complaints and History of Present Illnesses   Patient presents with     Follow Up For     Primary open angle glaucoma of both eyes, severe stage      HPI    Last Eye Exam:  11/1/16   Affected eye(s):  Both   Symptoms:        Frequency:  Constant       Do you have eye pain now?:  No      Comments:  She is here today for a follow up of Primary open angle glaucoma of both eyes, severe stage   She feels her vision is not as good as last year.  This is due to her right eye aching. The aching has been present for some time, but she is not certain how long. She says she uses her eye drops as directed.    Kaushik Snyder COT 9:31 AM November 2, 2017

## 2017-11-02 NOTE — PROGRESS NOTES
1)POAG/?LTG vs Glc Suspect -- s/p SLT OD (3/13/15), H/O negative head CT and MRI 2013,2014 for headaches, ?retinal vascular event in the right eye in the past.-- K pachy: 539/540   Tmax: OD:23    HVF: OD:[place] [facility] in 2016 (unable to transfer out of chair)     CDR: 0.7/0.9 (pallor OS>OD)    HRT/OCT: Severe RNFL thinning OS>OD       FHX of Glc: Father, grandparents -- lost vision, was on gtts     Gonio:       Intolerant to:      Asthma/COPD: Yes, on inhalers but tolerating topical and po BB   Steroid Use: No    Kidney Stones: No    Sulfa Allergy: No     IOP targets:  2)PCIOL OD -- vision loss in the right eye predates 2013 (pt believes she lost all vision in 2005) and she had several MRI's in 2013 including one with orbital sequences which did not reveal any structural change to cause a right optic neuropathy.  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    Patient will continue on Cosopt (Timolol/Dorzolamide) which is a blue top drop 2x/day (12 hours apart) in the RIGHT EYE ONLY, Latanoprost which is a teal top drop at bedtime in both eyes, and Alphagan (Brimonidine) which is a purple top drop 2x/day (12 hours apart) in the RIGHT EYE ONLY.  Patient will return to clinic in 3-4 months for repeat IOP check, dilated eye exam and visual field test (OD:LVC only).  Patient will also follow up with Manuela Mitchell for low vision evaluation.    Attending Physician Attestation:  Complete documentation of historical and exam elements from today's encounter can be found in the full encounter summary report (not reduplicated in this progress note). I personally obtained the chief complaint(s) and history of present  illness.  I confirmed and edited as necessary the review of systems, past medical/surgical history, family history, social history, and examination findings as documented by others; and I examined the patient myself. I personally reviewed the relevant tests, images, and reports as documented above. I formulated and edited as necessary the assessment and plan and discussed the findings and management plan with the patient and family.  - Marely Robin MD

## 2017-11-02 NOTE — PATIENT INSTRUCTIONS
Patient will continue on Cosopt (Timolol/Dorzolamide) which is a blue top drop 2x/day (12 hours apart) in the RIGHT EYE ONLY, Latanoprost which is a teal top drop at bedtime in both eyes, and Alphagan (Brimonidine) which is a purple top drop 2x/day (12 hours apart) in the RIGHT EYE ONLY.  Patient will return to clinic in 3-4 months for repeat IOP check, dilated eye exam and visual field test (OD:[facility] only).

## 2017-11-06 ENCOUNTER — OFFICE VISIT (OUTPATIENT)
Dept: SURGERY | Facility: CLINIC | Age: 67
End: 2017-11-06

## 2017-11-06 ENCOUNTER — ALLIED HEALTH/NURSE VISIT (OUTPATIENT)
Dept: SURGERY | Facility: CLINIC | Age: 67
End: 2017-11-06

## 2017-11-06 VITALS
RESPIRATION RATE: 16 BRPM | SYSTOLIC BLOOD PRESSURE: 97 MMHG | TEMPERATURE: 99.2 F | OXYGEN SATURATION: 95 % | WEIGHT: 133 LBS | BODY MASS INDEX: 20.83 KG/M2 | HEART RATE: 94 BPM | DIASTOLIC BLOOD PRESSURE: 66 MMHG

## 2017-11-06 DIAGNOSIS — Z01.818 PREOP EXAMINATION: Primary | ICD-10-CM

## 2017-11-06 DIAGNOSIS — G40.909 SEIZURE DISORDER (H): ICD-10-CM

## 2017-11-06 DIAGNOSIS — R50.9 FEVER, UNSPECIFIED FEVER CAUSE: Primary | ICD-10-CM

## 2017-11-06 DIAGNOSIS — N39.0 UTI (URINARY TRACT INFECTION): ICD-10-CM

## 2017-11-06 LAB
ALBUMIN UR-MCNC: 30 MG/DL
APPEARANCE UR: ABNORMAL
BACTERIA #/AREA URNS HPF: ABNORMAL /HPF
BILIRUB UR QL STRIP: NEGATIVE
COLOR UR AUTO: YELLOW
GLUCOSE UR STRIP-MCNC: NEGATIVE MG/DL
HGB UR QL STRIP: ABNORMAL
KETONES UR STRIP-MCNC: NEGATIVE MG/DL
LEUKOCYTE ESTERASE UR QL STRIP: ABNORMAL
MUCOUS THREADS #/AREA URNS LPF: PRESENT /LPF
NITRATE UR QL: NEGATIVE
PH UR STRIP: 6 PH (ref 5–7)
PHENYTOIN SERPL-MCNC: 9.1 MG/L (ref 10–20)
RBC #/AREA URNS AUTO: 2 /HPF (ref 0–2)
SOURCE: ABNORMAL
SP GR UR STRIP: 1.01 (ref 1–1.03)
SQUAMOUS #/AREA URNS AUTO: 1 /HPF (ref 0–1)
UROBILINOGEN UR STRIP-MCNC: 0 MG/DL (ref 0–2)
WBC # BLD AUTO: 15.6 10E9/L (ref 4–11)
WBC #/AREA URNS AUTO: 85 /HPF (ref 0–2)
WBC CLUMPS #/AREA URNS HPF: PRESENT /HPF

## 2017-11-06 ASSESSMENT — COPD QUESTIONNAIRES
COPD: 1
CAT_SEVERITY: MODERATE

## 2017-11-06 ASSESSMENT — ENCOUNTER SYMPTOMS: SEIZURES: 1

## 2017-11-06 ASSESSMENT — LIFESTYLE VARIABLES: TOBACCO_USE: 1

## 2017-11-06 NOTE — MR AVS SNAPSHOT
After Visit Summary   2017    Sonya Foote    MRN: [id number]           Patient Information     Date Of Birth          1950        Visit Information        Provider Department      2017 9:30 AM Rn, [facility]        Care Instructions    Preparing for Your Surgery      Name:  Sonya Foote   MRN:  [id number]   :  1950   Today's Date:  2017     Arriving for surgery:  Surgery date:  17  Arrival time:  5:45 am  Please come to:       [facility] [address]  [address] [place]  [address]    -   parking is available in front of the hospital from 5:15 am to 8:00 pm    -  Stop at the Information Desk in the lobby    -   Inform the information person that you are here for surgery. An escort to 3c will be provided. If you would not like an escort, please proceed to 3C on the 3rd floor. [phone]     What can I eat or drink?  -  You may have solid food or milk products until 8 hours prior to your surgery.  Stop 11:30 pm  17  -  You may have water, apple juice or 7up/Sprite until 2 hours prior to your surgery.  Stop 5:30 am  17    Which medicines can I take?          Stop advil/ibuprofen 1 - 2 days before surgery.       Stop vitamins and supplements 7 days before surgery.    -  Do NOT take these medications in the morning, the day of surgery:  17         Ferrous sulfate       All gels, ointments       Lisinopril       Hydrochlorothiazide       carafate    -  Please take these medications the day of surgery:  17         aspirin       Take all eye drops  Bring with you to the hospital       lexapro       Albuterol inhaler if needed and please bring to the hospital       lyrica       Metoprolol       Zyrtec if needed       protonix       Flexeril if needed       flonase if needed       Estradiol       keppra  (levetiracetam       Phenytoin            How do I prepare myself?  -   Take two showers: one the night before surgery; and one the morning of surgery.         Use Scrubcare or Hibiclens to wash from neck down.  You may use your own shampoo and conditioner. No other hair products.   -  Do NOT use lotion, powder, deodorant, or antiperspirant the day of your surgery.  -  Do NOT wear any makeup, fingernail polish or jewelry.  -Do not bring your own medications to the hospital, except for inhalers and eye drops.  -  Bring your ID and insurance card.    Questions or Concerns:  If you have questions or concerns, please call the  Preoperative Assessment Center, Monday-Friday 7AM-7PM:  [phone]  AFTER YOUR SURGERY  Breathing exercises   Breathing exercises help you recover faster. Take deep breaths and let the air out slowly. This will:     Help you wake up after surgery.    Help prevent complications like pneumonia.  Preventing complications will help you go home sooner.   We may give you a breathing device (incentive spirometer) to encourage you to breathe deeply.   Nausea and vomiting   You may feel sick to your stomach after surgery; if so, let your nurse know.    Pain control:  After surgery, you may have pain. Our goal is to help you manage your pain. Pain medicine will help you feel comfortable enough to do activities that will help you heal.  These activities may include breathing exercises, walking and physical therapy.   To help your health care team treat your pain we will ask: 1) If you have pain  2) where it is located 3) describe your pain in your words  Methods of pain control include medications given by mouth, vein or by nerve block for some surgeries.  We may give you a pain control pump that will:  1) Deliver the medicine through a tube placed in your vein  2) Control the amount of medicine you receive  3) Allow you to push a button to deliver a dose of pain medicine  Sequential Compression Device (SCD) or Pneumo Boots:  You may need to wear SCD S on your legs or feet.  These are wraps connected to a machine that pumps in air and releases it. The repeated pumping helps prevent blood clots from forming.   Using an Incentive Spirometer    An incentive spirometer is a device that helps you do deep breathing exercises. These exercises expand your lungs, aid in circulation, and help prevent pneumonia. Deep breathing exercises also help you breathe better and improve the function of your lungs by:    Keeping your lungs clear    Strengthening your breathing muscles    Helping prevent respiratory complications or problems  The incentive spirometer gives you a way to take an active part in your care. A nurse or therapist will teach you breathing exercises. To do these exercises, you will breathe in through your mouth and not your nose. The incentive spirometer only works correctly if you breathe in through your mouth.    Steps to clear lungs  Step 1. Exhale normally. Then, inhale normally.    Relax and breathe out.  Step 2. Place your lips tightly around the mouthpiece.    Make sure the device is upright and not tilted.  Step 3. Inhale as much air as you can through the mouthpiece (don't breath through your nose).    Inhale slowly and deeply.    Hold your breath long enough to keep the balls or disk raised for at least 3 to 5 seconds, or as instructed by your healthcare provider.  Step 4. Repeat the exercise regularly.    Begin using the Incentive Spirometer one week prior to your surgery, 4 times per day-5 times each.                    Follow-ups after your visit        Your next 10 appointments already scheduled     Nov 06, 2017  9:30 AM CST   (Arrive by 9:15 AM)   PAC RN ASSESSMENT with Jose Carlos Pac Rn   [facility] ([facility])    [address] [place] [address]  4th [facility] [id number] [phone]            Nov 06, 2017 10:10 AM CST   (Arrive by 9:55 AM)   PAC Anesthesia Consult with Jose Carlos Pac Anesthesiologist   [facility] Preoperative  [facility]essment Center ([facility])    [address] [facility]  4th [facility] [address]   [phone]            Nov 06, 2017 10:45 AM CST   LAB with  LAB   [facility] ([facility])    [address] [place] [address]  1st [facility] [address] [phone]           Please do not eat 10-12 hours before your appointment if you are coming in fasting for labs on lipids, cholesterol, or glucose (sugar). This does not apply to pregnant women. Water, hot tea and black coffee (with nothing added) are okay. Do not drink other fluids, diet soda or chew gum.            Nov 10, 2017  9:20 AM CST   (Arrive by 9:05 AM)   RETURN DIABETES with Michelle Irizarry MD   [facility] Endocrinology ([facility])    [address] [place] [address]  3rd [facility] [address] [phone]            Nov 13, 2017   Procedure with Elizabeth Oliver MD   [facility], [place], Same Day Surgery (--)    [address] [facility] [address]   [phone]            Dec 06, 2017 10:00 AM CST   Evaluation with Manuela Mitchell OT   [facility] [place], Occupational Therapy, Vision - Outpatie ([facility])    [address] [place] [address]  9th Floor, Clinic 9a  [facility] [address]   [phone]            Dec 14, 2017  1:45 PM CST   (Arrive by 1:30 PM)   Post-Op with Elizabeth Oliver MD   [facility] Urology and Inst for Prostate and Urologic Cancers ([facility])    [address] [place] [address]  4th [facility] [address]   [phone]            Dec 29, 2017 10:00 AM CST   (Arrive by 9:45 AM)   New Patient Visit with VICTOR M Floyd CNP   [facility] Gastroenterology and IBD Clinic ([facility])    [address] [place] [address]  4th [facility] [address]   [phone]            Feb 19, 2018  9:30 AM CST   (Arrive by 9:15 AM)   Return Visit with Alina He  MD Michaela   [facility] ([facility])    [address] [facility] [address] [address]  3rd Floor  [facility] [id number] [phone]            Mar 06, 2018  9:15 AM CST   VISUAL FIELD with [facility] EYE VISUAL FIELD   Eye Clinic ([facility])    [address]  [address]  9th Fl Clin 9a  [facility] [id number] [phone]              Future tests that were ordered for you today     Open Future Orders        Priority Expected Expires Ordered    UA reflex to Microscopic and Culture Routine 2017            Who to contact     Please call your clinic at [phone] to:    Ask questions about your health    Make or cancel appointments    Discuss your medicines    Learn about your test results    Speak to your doctor   If you have compliments or concerns about an experience at your clinic, or if you wish to file a complaint, please contact [facility] Patient Relations at [phone] or email us at [email or web address]         Additional Information About Your Visit        Espressi Information     Espressi is an electronic gateway that provides easy, online access to your medical records. With Espressi, you can request a clinic appointment, read your test results, renew a prescription or communicate with your care team.     To sign up for Espressi visit the website at [email or web address]   You will be asked to enter the access code listed below, as well as some personal information. Please follow the directions to create your username and password.     Your access code is: [email or web address]  Expires: 2018 11:09 AM     Your access code will  in 90 days. If you need help or a new code, please contact your [facility] or call [phone] for assistance.        Care EveryWhere ID     This is your Care EveryWhere ID. This could be used by other organizations  to access your [place] medical records  [id number]         Blood Pressure from Last 3 Encounters:   11/06/17 97/66   10/23/17 140/80   10/18/17 108/62    Weight from Last 3 Encounters:   11/06/17 60.3 kg (133 lb)   10/23/17 61.2 kg (135 lb)   10/18/17 61.2 kg (135 lb)              Today, you had the following     No orders found for display         Today's Medication Changes          These changes are accurate as of: 11/6/17  8:51 AM.  If you have any questions, ask your nurse or doctor.               These medicines have changed or have updated prescriptions.        Dose/Directions    aspirin 81 MG EC tablet   This may have changed:  when to take this   Used for:  Unstable angina (H)        Dose:  81 mg   Take 1 tablet (81 mg) by mouth daily   Quantity:  90 tablet   Refills:  3       atorvastatin 40 MG tablet   Commonly known as:  LIPITOR   This may have changed:  when to take this   Used for:  Hyperlipidemia LDL goal <100        Dose:  40 mg   Take 1 tablet (40 mg) by mouth daily   Quantity:  90 tablet   Refills:  3       brimonidine 0.2 % ophthalmic solution   Commonly known as:  ALPHAGAN   This may have changed:  when to take this   Used for:  Primary open angle glaucoma of both eyes, severe stage        Dose:  1 drop   Place 1 drop into the right eye 3 times daily   Quantity:  15 mL   Refills:  11       hydrochlorothiazide 12.5 MG capsule   Commonly known as:  MICROZIDE   This may have changed:  additional instructions   Used for:  Essential hypertension with goal blood pressure less than 130/80        Dose:  12.5 mg   Take 1 capsule (12.5 mg) by mouth daily   Quantity:  90 capsule   Refills:  3       latanoprost 0.005 % ophthalmic solution   Commonly known as:  XALATAN   This may have changed:  Another medication with the same name was removed. Continue taking this medication, and follow the directions you see here.   Used for:  Primary open angle glaucoma, stage unspecified   Changed by:  Azael Arriaga  MD Zi        Dose:  1 drop   Place 1 drop into both eyes At Bedtime   Quantity:  2.5 mL   Refills:  11       Phenytoin Sodium Extended 200 MG Caps   This may have changed:  additional instructions   Used for:  Seizure disorder (H)        Dose:  200 mg   Take 200 mg by mouth 2 times daily   Quantity:  60 capsule   Refills:  0       pregabalin 75 MG capsule   Commonly known as:  LYRICA   This may have changed:  Another medication with the same name was removed. Continue taking this medication, and follow the directions you see here.   Used for:  Pain in both upper extremities   Changed by:  Elizabeth Rose RPH        Dose:  150 mg   Take 2 capsules (150 mg) by mouth 2 times daily   Quantity:  120 capsule   Refills:  5       traZODone 100 MG tablet   Commonly known as:  DESYREL   This may have changed:  Another medication with the same name was removed. Continue taking this medication, and follow the directions you see here.   Used for:  Anxiety state   Changed by:  Elizabeth Rose RPH        Dose:  150 mg   Take 1.5 tablets (150 mg) by mouth At Bedtime   Quantity:  90 tablet   Refills:  3                Primary Care Provider Office Phone # Fax #    Allan Casey [phone]       [address] [facility] [address]        Equal Access to Services     [facility] AH: Hadii shaheen gastelum hadasho Soomaali, waaxda luqadaha, qaybta kaalmada adeegyada, tonie marroquin. So [facility] [phone].    ATENCIÓN: Si habla español, tiene a briones disposición servicios gratashvinos de asistencia lingüística. Llame al [phone].    We comply with applicable federal civil rights laws and [place] laws. We do not discriminate on the basis of race, color, national origin, age, disability, sex, sexual orientation, or gender identity.            Thank you!     Thank you for choosing [facility]  for your care. Our goal is always to provide you with excellent care. Hearing back  from our patients is one way we can continue to improve our services. Please take a few minutes to complete the written survey that you may receive in the mail after your visit with us. Thank you!             Your Updated Medication List - Protect others around you: Learn how to safely use, store and throw away your medicines at www.disposemymeds.org.          This list is accurate as of: 11/6/17  8:51 AM.  Always use your most recent med list.                   Brand Name Dispense Instructions for use Diagnosis    ACETAMINOPHEN PO      Take 1,000 mg by mouth every 6 hours as needed for fever Taking q4-6 hours, per MAR        ADVAIR DISKUS 250-50 MCG/DOSE diskus inhaler   Generic drug:  fluticasone-salmeterol      Inhale 1 puff into the lungs 2 times daily        * albuterol (2.5 MG/3ML) 0.083% neb solution     360 mL    INHALE 1 VIAL VIA NEBULIZER EVERY 6 HOURS AS NEEDED    Chronic obstructive pulmonary disease, unspecified COPD type (H)       * albuterol 108 (90 BASE) MCG/ACT Inhaler    PROAIR HFA/PROVENTIL HFA/VENTOLIN HFA    3 Inhaler    Inhale 2 puffs into the lungs every 6 hours as needed for shortness of breath / dyspnea or wheezing    JOSE (obstructive sleep apnea)       aspirin 81 MG EC tablet     90 tablet    Take 1 tablet (81 mg) by mouth daily    Unstable angina (H)       atorvastatin 40 MG tablet    LIPITOR    90 tablet    Take 1 tablet (40 mg) by mouth daily    Hyperlipidemia LDL goal <100       blood glucose monitoring lancets     100 each    Use to test blood sugar 3 times daily or as directed.    Type 2 diabetes mellitus with diabetic peripheral angiopathy without gangrene, without long-term current use of insulin (H)       blood glucose monitoring meter device kit     1 kit    Use to test blood sugars 3 times daily or as directed.    Type 2 diabetes, HbA1C goal < 8% (H)       blood glucose monitoring test strip    ONETOUCH ULTRA    3 Box    Use to test blood sugars 3 times daily or as directed.     Type 2 diabetes mellitus with diabetic peripheral angiopathy without gangrene, without long-term current use of insulin (H)       brimonidine 0.2 % ophthalmic solution    ALPHAGAN    15 mL    Place 1 drop into the right eye 3 times daily    Primary open angle glaucoma of both eyes, severe stage       calcium citrate-vitamin D 315-250 MG-UNIT Tabs per tablet    CITRACAL    120 tablet    Take 2 tablets by mouth daily    Osteoporosis       cetirizine 10 MG tablet    zyrTEC    90 tablet    Take 1 tablet (10 mg) by mouth daily as needed for allergies    Seasonal allergic rhinitis, unspecified allergic rhinitis trigger       CYANOCOBALAMIN PO      Take 2,000 mcg by mouth daily        cyclobenzaprine 10 MG tablet    FLEXERIL    30 tablet    Take 1 tablet (10 mg) by mouth 2 times daily as needed for muscle spasms    Cervicalgia       diclofenac 1 % Gel topical gel    VOLTAREN    100 g    Apply 2 g topically 4 times daily to hands    Primary osteoarthritis of both hands       dorzolamide 2 % ophthalmic solution    TRUSOPT     Place 1 drop into both eyes 2 times daily        dorzolamide-timolol 2-0.5 % ophthalmic solution    COSOPT    1 Bottle    Place 1 drop into the right eye 2 times daily    Primary open angle glaucoma of both eyes, severe stage       EPINEPHrine 0.15 MG/0.3ML injection 2-pack    EPIPEN JR    0.6 mL    Inject 0.3 mLs (0.15 mg) into the muscle as needed for anaphylaxis    Bee sting reaction, undetermined intent, subsequent encounter       escitalopram 5 MG tablet    LEXAPRO    60 tablet    Take 2 tablets (10 mg) by mouth daily    Adjustment disorder with depressed mood       estradiol 1 MG tablet    ESTRACE    90 tablet    Take 1 tablet (1 mg) by mouth daily    Person who has had sex change operation       ferrous sulfate 325 (65 FE) MG tablet    IRON    60 tablet    Take 1 tablet (325 mg) by mouth 2 times daily With meals        fluticasone 50 MCG/ACT spray    FLONASE     Spray 1 spray into both nostrils  daily        hydrochlorothiazide 12.5 MG capsule    MICROZIDE    90 capsule    Take 1 capsule (12.5 mg) by mouth daily    Essential hypertension with goal blood pressure less than 130/80       INCRUSE ELLIPTA 62.5 MCG/INH oral inhaler   Generic drug:  umeclidinium      Inhale 1 puff into the lungs daily        RENÉE Pack      Take 1 packet by mouth 2 times daily        latanoprost 0.005 % ophthalmic solution    XALATAN    2.5 mL    Place 1 drop into both eyes At Bedtime    Primary open angle glaucoma, stage unspecified       levETIRAcetam 500 MG tablet    KEPPRA    180 tablet    Take 1 tablet (500 mg) by mouth 2 times daily    Nausea       lidocaine 5 % Patch    LIDODERM    30 patch    APPLY 1 TO 3 PATCHES TO PAINFUL AREA AT ONCE FOR UP TO 12 HOURS WITHIN A 24 HOUR PERIOD REMOVE AFTER 12 HOURS    Chronic pain syndrome, 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 (H)       lisinopril 10 MG tablet    PRINIVIL/ZESTRIL    90 tablet    Take 1 tablet (10 mg) by mouth daily    Essential hypertension with goal blood pressure less than 130/80       MEDICATION GIVEN BY INTRATHECAL PUMP - INSTRUCTION      continuous May 19, 2017 - per [facility] in [place] [phone]: Conc: Bupivacaine 20 mg/mL and Fentanyl 2000 mcg/mL. Continuous: Bupivacaine 7.265 mg/day and Fentanyl 726.5 mcg/day. Boluses: Up to 7 boluses per 24-hr period of Bupivicaine 0.599 mg and Fentanyl 59.9 mcg  Pump Refill Date at [facility]: 7/2/17        metoprolol 25 MG 24 hr tablet    TOPROL-XL    30 tablet    TAKE 1 TABLET (25 MG) BY MOUTH DAILY    Old myocardial infarction       MULTIVITAMIN PO      Take 1 tablet by mouth daily        nitroGLYcerin 0.4 MG sublingual tablet    NITROSTAT    25 tablet    Place 1 tablet (0.4 mg) under the tongue every 5 minutes as needed for chest pain if you are still having symptoms after 3 doses (15 minutes) call 911.    Chronic systolic congestive heart failure (H), Old  myocardial infarction       ONDANSETRON PO      Take 4 mg by mouth 3 times daily        * order for DME     1 Month    Equipment being ordered: Depends briefs    Incontinence       * order for DME     1 Device    Equipment being ordered: Power Wheelchair    CVA (cerebral infarction), HTN (hypertension)       * order for DME     1 Box    Equipment being ordered: Glucerna daily shakes with each meal    Type 2 diabetes mellitus with other diabetic neurological complication       * order for DME     1 Units    Equipment being ordered: Nebulizer and tubing supplies    Simple chronic bronchitis (H)       * order for DME     1 Device    Equipment being ordered: CPAP supplies mask, hose, filters, cushion fax to [facility] at [phone] Disp: 10 DeviceRefills: prn Class: Local PrintStart: 2/10/2017    Chronic obstructive pulmonary disease, unspecified COPD type (H)       * order for DME     10 Device    Equipment being ordered: CPAP supplies mask, hose, filters, cushion  fax to [facility] at [phone]    COPD (chronic obstructive pulmonary disease) (H)       * order for DME     1 Device    Hospital bed with side rails    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 (H)       * order for DME     1 Device    Full electric hospital bed with half rails  Dx: X19263, I110, J449 Length of need: lifetime    Status post bilateral above knee amputation (H)       * order for DME     1 Device    Wheel Chair Cushion: 18 x 18 inch Roho cushion    Status post bilateral above knee amputation (H)       pantoprazole 40 MG EC tablet    PROTONIX     Take 40 mg by mouth daily        Phenytoin Sodium Extended 200 MG Caps     60 capsule    Take 200 mg by mouth 2 times daily    Seizure disorder (H)       polyethylene glycol Packet    MIRALAX/GLYCOLAX     Take 17 g by mouth daily as needed Dissolved in water or juice        pregabalin 75 MG capsule    LYRICA    120 capsule    Take 2 capsules (150 mg) by mouth 2 times daily     Pain in both upper extremities       prochlorperazine 10 MG tablet    COMPAZINE    20 tablet    Take 1 tablet (10 mg) by mouth every 8 hours as needed    Nausea with vomiting       risperiDONE 0.5 MG tablet    risperDAL     Take 0.5 mg by mouth At Bedtime        sucralfate 1 GM tablet    CARAFATE    120 tablet    Take 1 tablet (1 g) by mouth 4 times daily May dissolve in 10 mL water is necessary. (Start upon completion of carafate suspension)    Adjustment disorder with depressed mood       traZODone 100 MG tablet    DESYREL    90 tablet    Take 1.5 tablets (150 mg) by mouth At Bedtime    Anxiety state       vitamin D 2000 UNITS tablet     100 tablet    Take 2,000 Units by mouth daily.        zinc 50 MG Tabs      Take 1 tablet by mouth daily        * Notice:  This list has 11 medication(s) that are the same as other medications prescribed for you. Read the directions carefully, and ask your doctor or other care provider to review them with you.

## 2017-11-06 NOTE — PATIENT INSTRUCTIONS
Preparing for Your Surgery      Name:  Sonya Foote   MRN:  [id number]   :  1950   Today's Date:  2017     Arriving for surgery:  Surgery date:  17  Arrival time:  5:45 am  Please come to:       [facility] [address]  [address] [place]  [address]    -   parking is available in front of the hospital from 5:15 am to 8:00 pm    -  Stop at the Information Desk in the lobby    -   Inform the information person that you are here for surgery. An escort to 3c will be provided. If you would not like an escort, please proceed to 3C on the 3rd floor. [phone]     What can I eat or drink?  -  You may have solid food or milk products until 8 hours prior to your surgery.  Stop 11:30 pm  17  -  You may have water, apple juice or 7up/Sprite until 2 hours prior to your surgery.  Stop 5:30 am  17    Which medicines can I take?          Stop advil/ibuprofen 1 - 2 days before surgery.       Stop vitamins and supplements 7 days before surgery.    -  Do NOT take these medications in the morning, the day of surgery:  17         Ferrous sulfate       All gels, ointments       Lisinopril       Hydrochlorothiazide       carafate    -  Please take these medications the day of surgery:  17         aspirin       Take all eye drops  Bring with you to the hospital       lexapro       Albuterol inhaler if needed and please bring to the hospital       lyrica       Metoprolol       Zyrtec if needed       protonix       Flexeril if needed       flonase if needed       Estradiol       keppra  (levetiracetam       Phenytoin            How do I prepare myself?  -  Take two showers: one the night before surgery; and one the morning of surgery.         Use Scrubcare or Hibiclens to wash from neck down.  You may use your own shampoo and conditioner. No other hair products.   -  Do NOT use lotion, powder, deodorant, or antiperspirant the day of your surgery.  -   Do NOT wear any makeup, fingernail polish or jewelry.  -Do not bring your own medications to the hospital, except for inhalers and eye drops.  -  Bring your ID and insurance card.    Questions or Concerns:  If you have questions or concerns, please call the  [facility], Monday-Friday 7AM-7PM:  [phone]  AFTER YOUR SURGERY  Breathing exercises   Breathing exercises help you recover faster. Take deep breaths and let the air out slowly. This will:     Help you wake up after surgery.    Help prevent complications like pneumonia.  Preventing complications will help you go home sooner.   We may give you a breathing device (incentive spirometer) to encourage you to breathe deeply.   Nausea and vomiting   You may feel sick to your stomach after surgery; if so, let your nurse know.    Pain control:  After surgery, you may have pain. Our goal is to help you manage your pain. Pain medicine will help you feel comfortable enough to do activities that will help you heal.  These activities may include breathing exercises, walking and physical therapy.   To help your health care team treat your pain we will ask: 1) If you have pain  2) where it is located 3) describe your pain in your words  Methods of pain control include medications given by mouth, vein or by nerve block for some surgeries.  We may give you a pain control pump that will:  1) Deliver the medicine through a tube placed in your vein  2) Control the amount of medicine you receive  3) Allow you to push a button to deliver a dose of pain medicine  Sequential Compression Device (SCD) or Pneumo Boots:  You may need to wear SCD S on your legs or feet. These are wraps connected to a machine that pumps in air and releases it. The repeated pumping helps prevent blood clots from forming.   Using an Incentive Spirometer    An incentive spirometer is a device that helps you do deep breathing exercises. These exercises expand your lungs, aid in  circulation, and help prevent pneumonia. Deep breathing exercises also help you breathe better and improve the function of your lungs by:    Keeping your lungs clear    Strengthening your breathing muscles    Helping prevent respiratory complications or problems  The incentive spirometer gives you a way to take an active part in your care. A nurse or therapist will teach you breathing exercises. To do these exercises, you will breathe in through your mouth and not your nose. The incentive spirometer only works correctly if you breathe in through your mouth.    Steps to clear lungs  Step 1. Exhale normally. Then, inhale normally.    Relax and breathe out.  Step 2. Place your lips tightly around the mouthpiece.    Make sure the device is upright and not tilted.  Step 3. Inhale as much air as you can through the mouthpiece (don't breath through your nose).    Inhale slowly and deeply.    Hold your breath long enough to keep the balls or disk raised for at least 3 to 5 seconds, or as instructed by your healthcare provider.  Step 4. Repeat the exercise regularly.    Begin using the Incentive Spirometer one week prior to your surgery, 4 times per day-5 times each.

## 2017-11-06 NOTE — H&P
Pre-Operative H & P     CC:  Preoperative exam to assess for increased cardiopulmonary risk while undergoing surgery and anesthesia.    Date of Encounter: 11/6/2017  Primary Care Physician:  Allan Casey  Sonya Foote is a 67 year old female who presents for pre-operative H & P in preparation for cysto and SPC with Dr. Oliver on 11/13/17 at [facility].       History is obtained from the patient and electronic health record.     Past Medical History  Past Medical History:   Diagnosis Date     Anemia      Antiplatelet or antithrombotic long-term use      Arthritis      AS (sickle cell trait) (H) 10/8/2013     Blind left eye      BPPV (benign paroxysmal positional vertigo)      Chronic back pain      COPD (chronic obstructive pulmonary disease) (H)      Coronary artery disease      CVA (cerebral infarction) 10/8/2012    x3, residual left sided weakness     Diastolic CHF (H) 9/4/2012     Dilantin toxicity 5/31/2013     Esophageal candidiasis (H)      GERD (gastroesophageal reflux disease)      Heart attack      Hernia, abdominal      History of blood transfusion     x5     History of thrombophlebitis      HTN (hypertension) 9/4/2012     Hyperlipidemia LDL goal <100 9/4/2012     Legally blind in right eye, as defined in [place]     No periphal vision right eye     Osteoporosis 1/21/2013     Other chronic pain      PAD (peripheral artery disease) (H)      Palpitations      Person who has had sex change operation 1/20/2014     Pneumonia      Schizoaffective disorder (H)      Seizure disorder (H) 9/4/2012     Sleep apnea     CPAP     Thrombosis of leg      Type 2 diabetes, HbA1C goal < 8% (H) 9/4/2012     Uncomplicated asthma      Unspecified cerebral artery occlusion with cerebral infarction        Past Surgical History  Past Surgical History:   Procedure Laterality Date     AMPUTATE LEG ABOVE KNEE Left 6/11/2016    Procedure: AMPUTATE LEG ABOVE KNEE;  Surgeon: Mello Rodriguez  MD Todd;  [facility]     AMPUTATE LEG BELOW KNEE Right 11/7/2016    Procedure: AMPUTATE LEG BELOW KNEE;  Surgeon: Savannah Durant MD;  [facility]     AMPUTATE REVISION STUMP LOWER EXTREMITY Right 11/11/2016    Procedure: AMPUTATE REVISION STUMP LOWER EXTREMITY;  Surgeon: Savannah Durant MD;  [facility]     AMPUTATE REVISION STUMP LOWER EXTREMITY Right 11/16/2016    Procedure: AMPUTATE REVISION STUMP LOWER EXTREMITY;  Surgeon: Savannah Durant MD;  [facility]     AMPUTATE TOE(S) Right 1/5/2016    Procedure: AMPUTATE TOE(S);  Surgeon: Mello Gaines DPM;  Location: [facility]     ANGIOGRAM Bilateral 11/21/2014    Procedure: ANGIOGRAM;  Surgeon: Savannah Durant MD;  [facility]     ANGIOGRAM Left 1/16/2015    Procedure: ANGIOGRAM;  Surgeon: Savannah Durant MD;  [facility]     ANGIOGRAM Bilateral 9/14/2015    Procedure: ANGIOGRAM;  Surgeon: Savannah Durant MD;  [facility]     ANGIOGRAM Left 10/12/2015    Procedure: ANGIOGRAM;  Surgeon: Savannah Durant MD;  [facility]     ANGIOGRAM Right 6/6/2016    Procedure: ANGIOGRAM;  Surgeon: Savannah Durant MD;  [facility]     ANGIOPLASTY Right 6/6/2016    Procedure: ANGIOPLASTY;  Surgeon: Savannah Durant MD;  [facility]     APPENDECTOMY       BREAST SURGERY      right breast bx (benign)     CHOLECYSTECTOMY       COLONOSCOPY N/A 8/25/2014    Procedure: COLONOSCOPY;  Surgeon: Mello Ferrer MD;  [facility]     COLONOSCOPY WITH CO2 INSUFFLATION N/A 8/20/2014    Procedure: COLONOSCOPY WITH CO2 INSUFFLATION;  Surgeon: Duane, William Charles, MD;  Location: [facility]     ENDARTERECTOMY FEMORAL  5/23/2014    Procedure: ENDARTERECTOMY FEMORAL;  Surgeon: Jason Joshi MD;  [facility]     ESOPHAGOSCOPY, GASTROSCOPY, DUODENOSCOPY (EGD), COMBINED  12/14/2012    Procedure: COMBINED ESOPHAGOSCOPY, GASTROSCOPY, DUODENOSCOPY (EGD), BIOPSY SINGLE OR MULTIPLE;  ESOPHAGOSCOPY, GASTROSCOPY, DUODENOSCOPY (EGD), DILATATION ;  Surgeon: Elizabeth Stevenson  MD;  Location:  GI     ESOPHAGOSCOPY, GASTROSCOPY, DUODENOSCOPY (EGD), COMBINED  12/31/2013    Procedure: COMBINED ESOPHAGOSCOPY, GASTROSCOPY, DUODENOSCOPY (EGD), BIOPSY SINGLE OR MULTIPLE;;  Surgeon: Clemente Lopez MD;  Location:  GI     ESOPHAGOSCOPY, GASTROSCOPY, DUODENOSCOPY (EGD), COMBINED  4/1/2014    Procedure: COMBINED ESOPHAGOSCOPY, GASTROSCOPY, DUODENOSCOPY (EGD);;  Surgeon: Clemente Lopez MD;  Location:  GI     ESOPHAGOSCOPY, GASTROSCOPY, DUODENOSCOPY (EGD), COMBINED  6/28/2014    Procedure: COMBINED ESOPHAGOSCOPY, GASTROSCOPY, DUODENOSCOPY (EGD);  Surgeon: Clemente Lopez MD;  Location:  GI     ESOPHAGOSCOPY, GASTROSCOPY, DUODENOSCOPY (EGD), COMBINED N/A 8/20/2014    Procedure: COMBINED ESOPHAGOSCOPY, GASTROSCOPY, DUODENOSCOPY (EGD), BIOPSY SINGLE OR MULTIPLE;  Surgeon: Duane, William Charles, MD;  Location: [facility]     ESOPHAGOSCOPY, GASTROSCOPY, DUODENOSCOPY (EGD), COMBINED N/A 8/22/2014    Procedure: COMBINED ESOPHAGOSCOPY, GASTROSCOPY, DUODENOSCOPY (EGD), BIOPSY SINGLE OR MULTIPLE;  Surgeon: Mello Ferrer MD;  Location:  GI     ESOPHAGOSCOPY, GASTROSCOPY, DUODENOSCOPY (EGD), COMBINED N/A 10/2/2014    Procedure: COMBINED ESOPHAGOSCOPY, GASTROSCOPY, DUODENOSCOPY (EGD), BIOPSY SINGLE OR MULTIPLE;  Surgeon: Remy Haskins MD;  Location:  GI     ESOPHAGOSCOPY, GASTROSCOPY, DUODENOSCOPY (EGD), COMBINED Left 12/15/2014    Procedure: COMBINED ESOPHAGOSCOPY, GASTROSCOPY, DUODENOSCOPY (EGD), BIOPSY SINGLE OR MULTIPLE;  Surgeon: Remy Haskins MD;  Location:  GI     ESOPHAGOSCOPY, GASTROSCOPY, DUODENOSCOPY (EGD), COMBINED N/A 2/25/2015    Procedure: COMBINED ENDOSCOPIC ULTRASOUND, ESOPHAGOSCOPY, GASTROSCOPY, DUODENOSCOPY (EGD), FINE NEEDLE ASPIRATE/BIOPSY;  Surgeon: Clemente Lugo MD;  Location:  GI     ESOPHAGOSCOPY, GASTROSCOPY, DUODENOSCOPY (EGD), COMBINED Left 2/25/2015    Procedure: COMBINED ESOPHAGOSCOPY, GASTROSCOPY, DUODENOSCOPY (EGD), BIOPSY SINGLE OR MULTIPLE;   Surgeon: Clemente Lugo MD;  [facility]     ESOPHAGOSCOPY, GASTROSCOPY, DUODENOSCOPY (EGD), COMBINED N/A 9/25/2016    Procedure: COMBINED ESOPHAGOSCOPY, GASTROSCOPY, DUODENOSCOPY (EGD);  Surgeon: Aziza Patiño MD;  [facility]     ESOPHAGOSCOPY, GASTROSCOPY, DUODENOSCOPY (EGD), COMBINED N/A 1/18/2017    Procedure: COMBINED ESOPHAGOSCOPY, GASTROSCOPY, DUODENOSCOPY (EGD), BIOPSY SINGLE OR MULTIPLE;  Surgeon: Clemente Lopez MD;  [facility]     FASCIOTOMY LOWER EXTREMITY Left 6/10/2016    Procedure: FASCIOTOMY LOWER EXTREMITY;  Surgeon: Mello Rodriguez MD;  [facility]     HC CAPSULE ENDOSCOPY N/A 8/25/2014    Procedure: CAPSULE/PILL CAM ENDOSCOPY;  Surgeon: Remy Haskins MD;  [facility]     HC CAPSULE ENDOSCOPY N/A 10/2/2014    Procedure: CAPSULE/PILL CAM ENDOSCOPY;  Surgeon: Remy Haskins MD;  [facility]     ORTHOPEDIC SURGERY      broken wrist repair     SEX TRANSFORMATION SURGERY, MALE TO FEMALE      1974     SINUS SURGERY      cyst removed     TONSILLECTOMY       VASCULAR SURGERY      Left carotid stent       Hx of Blood transfusions/reactions: Yes.  ? Hives (patient denies)     Hx of abnormal bleeding or anti-platelet use: On ASA    Menstrual history: No LMP recorded. Patient is not currently having periods (Reason: Amenorrhea).:     Steroid use in the last year: no    Personal or FH with difficulty with Anesthesia:  no    Prior to Admission Medications  Current Outpatient Prescriptions   Medication Sig Dispense Refill     brimonidine (ALPHAGAN) 0.2 % ophthalmic solution Place 1 drop into the right eye 3 times daily (Patient taking differently: Place 1 drop into the right eye 2 times daily ) 15 mL 11     dorzolamide-timolol (COSOPT) 2-0.5 % ophthalmic solution Place 1 drop into the right eye 2 times daily 1 Bottle 11     [DISCONTINUED] latanoprost (XALATAN) 0.005 % ophthalmic solution Place 1 drop into both eyes At Bedtime 1 Bottle 11     sucralfate  (CARAFATE) 1 GM tablet Take 1 tablet (1 g) by mouth 4 times daily May dissolve in 10 mL water is necessary. (Start upon completion of carafate suspension) 120 tablet 11     escitalopram (LEXAPRO) 5 MG tablet Take 2 tablets (10 mg) by mouth daily 60 tablet 5     albuterol (PROAIR HFA/PROVENTIL HFA/VENTOLIN HFA) 108 (90 BASE) MCG/ACT Inhaler Inhale 2 puffs into the lungs every 6 hours as needed for shortness of breath / dyspnea or wheezing 3 Inhaler 1     umeclidinium (INCRUSE ELLIPTA) 62.5 MCG/INH oral inhaler Inhale 1 puff into the lungs daily       ONDANSETRON PO Take 4 mg by mouth 3 times daily       cyclobenzaprine (FLEXERIL) 10 MG tablet Take 1 tablet (10 mg) by mouth 2 times daily as needed for muscle spasms 30 tablet 1     [DISCONTINUED] traZODone (DESYREL) 50 MG tablet        [DISCONTINUED] LYRICA 100 MG capsule        lidocaine (LIDODERM) 5 % Patch APPLY 1 TO 3 PATCHES TO PAINFUL AREA AT ONCE FOR UP TO 12 HOURS WITHIN A 24 HOUR PERIOD REMOVE AFTER 12 HOURS 30 patch 5     blood glucose monitoring (ONE TOUCH ULTRASOFT) lancets Use to test blood sugar 3 times daily or as directed. 100 each PRN     blood glucose monitoring (ONE TOUCH ULTRA) test strip Use to test blood sugars 3 times daily or as directed. 3 Box 3     metoprolol (TOPROL-XL) 25 MG 24 hr tablet TAKE 1 TABLET (25 MG) BY MOUTH DAILY 30 tablet 10     traZODone (DESYREL) 100 MG tablet Take 1.5 tablets (150 mg) by mouth At Bedtime 90 tablet 3     order for [facility] Full electric hospital bed with half rails    Dx: D17862, I110, J449  Length of need: lifetime 1 Device 0     order for [facility] Wheel Chair Cushion: 18 x 18 inch Roho cushion 1 Device 0     order for [facility] Hospital bed with side rails 1 Device 0     polyethylene glycol (MIRALAX/GLYCOLAX) Packet Take 17 g by mouth daily as needed Dissolved in water or juice        ACETAMINOPHEN PO Take 1,000 mg by mouth every 6 hours as needed for fever Taking q4-6 hours, per MAR       pregabalin (LYRICA) 75 MG capsule  Take 2 capsules (150 mg) by mouth 2 times daily 120 capsule 5     cetirizine (ZYRTEC) 10 MG tablet Take 1 tablet (10 mg) by mouth daily as needed for allergies 90 tablet 3     diclofenac (VOLTAREN) 1 % GEL topical gel Apply 2 g topically 4 times daily to hands 100 g 11     EPINEPHrine (EPIPEN JR) 0.15 MG/0.3ML injection Inject 0.3 mLs (0.15 mg) into the muscle as needed for anaphylaxis 0.6 mL 1     lisinopril (PRINIVIL/ZESTRIL) 10 MG tablet Take 1 tablet (10 mg) by mouth daily 90 tablet 3     pantoprazole (PROTONIX) 40 MG EC tablet Take 40 mg by mouth daily       risperiDONE (RISPERDAL) 0.5 MG tablet Take 0.5 mg by mouth At Bedtime       ferrous sulfate (IRON) 325 (65 FE) MG tablet Take 1 tablet (325 mg) by mouth 2 times daily With meals 60 tablet 2     order for DME Equipment being ordered: CPAP supplies mask, hose, filters, cushion    fax to [facility] at [phone] 10 Device prn     order for DME Equipment being ordered: CPAP supplies mask, hose, filters, cushion fax to [facility] at [phone]  Disp: 10 Device Refills: prn   Class: Local Print Start: 2/10/2017 1 Device 0     order for DME Equipment being ordered: Nebulizer and tubing supplies 1 Units 0     albuterol (2.5 MG/3ML) 0.083% neb solution INHALE 1 VIAL VIA NEBULIZER EVERY 6 HOURS AS NEEDED 360 mL 11     CYANOCOBALAMIN PO Take 2,000 mcg by mouth daily       Nutritional Supplements (RENÉE) PACK Take 1 packet by mouth 2 times daily       fluticasone (FLONASE) 50 MCG/ACT nasal spray Spray 1 spray into both nostrils daily       ADVAIR DISKUS 250-50 MCG/DOSE diskus inhaler Inhale 1 puff into the lungs 2 times daily        calcium citrate-vitamin D (CITRACAL) 315-250 MG-UNIT TABS Take 2 tablets by mouth daily 120 tablet 5     hydrochlorothiazide (MICROZIDE) 12.5 MG capsule Take 1 capsule (12.5 mg) by mouth daily (Patient taking differently: Take 12.5 mg by mouth daily Hold for sbp<90) 90 capsule 3     estradiol (ESTRACE) 1 MG tablet Take 1 tablet  (1 mg) by mouth daily 90 tablet 3     levETIRAcetam (KEPPRA) 500 MG tablet Take 1 tablet (500 mg) by mouth 2 times daily 180 tablet 1     aspirin EC 81 MG EC tablet Take 1 tablet (81 mg) by mouth daily (Patient taking differently: Take 81 mg by mouth every morning ) 90 tablet 3     blood glucose monitoring (ONE TOUCH ULTRA 2) meter device kit Use to test blood sugars 3 times daily or as directed. 1 kit 0     phenytoin 200 MG CAPS Take 200 mg by mouth 2 times daily (Patient taking differently: Take 200 mg by mouth 2 times daily (takes at 8 AM and 8 PM)) 60 capsule 0     dorzolamide (TRUSOPT) 2 % ophthalmic solution Place 1 drop into both eyes 2 times daily        zinc 50 MG TABS Take 1 tablet by mouth daily       nitroglycerin (NITROSTAT) 0.4 MG SL tablet Place 1 tablet (0.4 mg) under the tongue every 5 minutes as needed for chest pain if you are still having symptoms after 3 doses (15 minutes) call 911. 25 tablet 1     MEDICATION GIVEN BY INTRATHECAL PUMP - INSTRUCTION continuous May 19, 2017 - per [facility] in [place] [phone]:  Conc: Bupivacaine 20 mg/mL and Fentanyl 2000 mcg/mL.  Continuous: Bupivacaine 7.265 mg/day and Fentanyl 726.5 mcg/day.  Boluses: Up to 7 boluses per 24-hr period of Bupivicaine 0.599 mg and Fentanyl 59.9 mcg    Pump Refill Date at [facility]: 7/2/17       latanoprost (XALATAN) 0.005 % ophthalmic solution Place 1 drop into both eyes At Bedtime 2.5 mL 11     atorvastatin (LIPITOR) 40 MG tablet Take 1 tablet (40 mg) by mouth daily (Patient taking differently: Take 40 mg by mouth At Bedtime ) 90 tablet 3     order for DME Equipment being ordered: Glucerna daily shakes with each meal 1 Box 11     prochlorperazine (COMPAZINE) 10 MG tablet Take 1 tablet (10 mg) by mouth every 8 hours as needed 20 tablet 0     ORDER FOR DME Equipment being ordered: Power Wheelchair 1 Device 0     ORDER FOR DME Equipment being ordered: Depends briefs 1 Month 11      Cholecalciferol (VITAMIN D) 2000 UNITS tablet Take 2,000 Units by mouth daily. 100 tablet 3     Multiple Vitamin (MULTIVITAMIN OR) Take 1 tablet by mouth daily          Allergies  Allergies   Allergen Reactions     Bee Venom      Penicillins Anaphylaxis     Other reaction(s): Skin Rash and/or Hives     Dilantin [Phenytoin] Other (See Comments)     Generic dilantin only per pt     Iodide Hives     09/11/15: Pt states she can use iodine and doesn't have any problems      Iodine-131      Novocaine [Procaine] Hives     Other reaction(s): Skin Rash and/or Hives     Tositumomab        Social History  Social History     Social History     Marital status:      Spouse name: Jayde     Number of children: 2     Years of education: N/A     Occupational History     mental health worker      retired     Social History Main Topics     Smoking status: Current Every Day Smoker     Packs/day: 2.50     Years: 30.00     Types: Cigarettes, Cigars     Last attempt to quit: 11/1/2000     Smokeless tobacco: Never Used      Comment: Smoking 3 cig per day with each meal.      Alcohol use No     Drug use: No     Sexual activity: Not Currently     Other Topics Concern     Caffeine Concern No     1 in the morning     Social History Narrative      Her father was in the  and she moved around a lot when she was a kid, and has lived abroad including in [place] and the [facility].  She was previously employed as a mental health worker. Moved from [place] to [place] in 2012. She is currently living in assisted living ([facility]).  Wife passed away 6/2017.            She has 2 adopted children (Kam and Prashanth)       Family History  Family History   Problem Relation Age of Onset     Dementia Mother      Glaucoma Mother      DIABETES Mother      may have been type 1, childhood     Coronary Artery Disease Mother      MI     Glaucoma Father      DIABETES Father      may hev been type 1 - ??     Heart Failure  "Father      CANCER Maternal Aunt      leukemia     Schizophrenia Brother      Depression Brother      Suicide Sister      Depression Sister      DIABETES Sister      CANCER Maternal Aunt      ovarian     Glaucoma Maternal Grandmother      DIABETES Maternal Grandmother      Glaucoma Maternal Grandfather      DIABETES Maternal Grandfather      Glaucoma Paternal Grandmother      DIABETES Paternal Grandmother      Glaucoma Paternal Grandfather      DIABETES Paternal Grandfather      Breast Cancer Sister      CEREBROVASCULAR DISEASE Brother      Colon Cancer No family hx of          ROS/MED HX  The complete review of systems is negative other than noted in the HPI or here. '  ENT/Pulmonary: Comment: [place]       (+)sleep apnea, allergic rhinitis, vocal cord abnormalities- Horseness, tobacco use (Quit 15 years ago.  Smoked 1-2 PPD for 30 years.), Current use 3 cig per day packs/day  Moderate Persistent asthma Last exacerbation: > 1year ago,Treatment: Inhaled steroids, Nebulizer daily and Inhaler prn,  moderate COPD, O2 dependent, during Nighttime 2L with CPAP liters/min,  uses CPAP on Oxygen 2L at night cmH2O , . .   : Wears corrective lenses.  Cannot see out of left eye.  Limited right eye vision with no peripheral vision. Legally blind.    Neurologic:     (+)neuropathy - hands, seizures last seizure:  Pet[place] Mal 4-5  time per day.  Grand Mal - last 2005. features: \"stops talking and forgets what was said\", CVA (Cerebral vascular dz,s/p stent to left carotid artery 2005.) date: 2005, 2007 & 2008 with deficits- Presented with left side weakness and peripheral vision loss.    , other neuro Legally blind (no peripheral vision)  RLS. BPPV.  Chronic back pain    Cardiovascular: Comment: PVD and thrombosis of LLE, s/p left AKA 6/6/2016   PVD RLE, s/p right AKA 11/23/2016.  Left carotid stent.  Carotid US 10/2017: < 50% bilat stenosis.       (+) Dyslipidemia, hypertension-range: variable, Peripheral Vascular Disease-- Carotid " Stenosis, CAD, -past MI (Inferior MI 9/2016.),-stent (s/p COLLEEN to pRCA and mRCA),9/2016   2 Drug Eluting Stent .. Taking blood thinners Pt has received instructions: Instructions Given to patient: stay on ASA.     (-) angina, BEDOYA and angina   METS/Exercise Tolerance: Comment: Patient lives in assisted living.  Is able to dress herself, but does need help with showering. 1 - Eating, dressing   Hematologic:     (+) History of blood clots (Left LE  prior to AKA.) pt is not anticoagulated, Anemia, History of Transfusion no previous transfusion reaction Other Hematologic Disorder-Sickle cell trait      Musculoskeletal: Comment: DDD - severe  Chronic low back pain  (+) arthritis (hands), , , -       GI/Hepatic: Comment: Chronic dysphagia   Esophageal strictures and previous dilations.    (+) GERD Asymptomatic on medication,       Renal/Genitourinary:     (+) Other Renal/ Genitourinary, Frequent UTI's.  Functional incontinence      Endo:     (+) type II DM Last HgA1c: 4.5 date: 8/2017 Not using insulin - not using insulin pump Normal glucose range: 140-200 (trending up couple months) not previously admitted for DM/DKA Diabetic complications: neuropathy, .      Psychiatric:     (+) psychiatric history anxiety, depression and other (comment) (Schizoaffective disorder)      Infectious Disease: Comment: 99.2 (low grade fever) on 11/6/17       Malignancy:      - no malignancy   Other: Comment: Chronic pain from neuropathy and PVD  Intrathecal pump with bupivacaine and fentanyl located in right lower back.   (+) No chance of pregnancy C-spine cleared: N/A, H/O Chronic Pain,H/O chronic opiod use , other significant disability Wheelchair bound and Blind       Temp: 99.2  F (37.3  C) Temp src: Oral BP: 97/66 Pulse: 94   Resp: 16 SpO2: 95 %       133 lbs 0 oz  Data Unavailable   Body mass index is 20.83 kg/(m^2).       Physical Exam  Constitutional: Awake, alert, cooperative, no apparent distress, and appears stated age.  In  motorized w/c.  Non toxic.   Eyes: Pupils equal, round  and poorly reactive to light, sclera clear, conjunctiva normal.  HENT: Normocephalic, oral pharynx with moist mucus membranes, edentulous.  No goiter appreciated.   Respiratory: Clear to auscultation bilaterally, no crackles or wheezing.  Few crackles in bases.   Cardiovascular: Regular rate and rhythm, normal S1 and S2, and no murmur noted.  Right carotid bruit.   GI: Normal bowel sounds, soft, non-distended, non-tender, no masses palpated, no hepatosplenomegaly.    Lymph/Hematologic: No cervical lymphadenopathy and no supraclavicular lymphadenopathy.  Genitourinary:  Wearing depends.   Skin: Warm and dry.  No rashes at anticipated surgical site.  Right lower back intrathecal pump.   Musculoskeletal: Full ROM of neck. There is no redness, warmth, or swelling of the joints.   Neurologic: Awake, alert, oriented to name, place and time. Cranial nerves II-XII are grossly intact.  Neuropsychiatric: Calm, cooperative. Normal affect.     Labs: (personally reviewed)  10/11/2017   Sodium: 140  Potassium: 3.6  Chloride: 108  Carbon Dioxide: 25  Urea Nitrogen: 6 (L)  Creatinine: 0.51 (L)  GFR Estimate: >90  GFR Estimate If Black: >90  Calcium: 8.9  Anion Gap: 7  Glucose: 90    WBC: 8.3  Hemoglobin: 10.9 (L)  Hematocrit: 33.4 (L)  Platelet Count: 270  RBC Count: 3.88  MCV: 86    10/23/2017 11:51  Phenytoin Level: 11.7  Phenytoin Free: 0.94 (L)    EKG 10/8/17:  NSR with LVH    US CAROTID BILATERAL 10/2017:  1. Right carotid:  less than 50% stenosis   2. Left carotid stent: Patent left internal carotid artery stent with less than 50% stenosis      Yancy stress test 10/2/17:  Impression  1.  Myocardial perfusion imaging using single isotope technique  demonstrated a small, basal inferior nontransmural infarction and a  very small area of mild ischemia involving the distal inferolateral  wall and apex.   2. Gated images demonstrated mild basal inferior hypokinesis.  The  left  ventricular systolic function is normal, with an ejection  fraction measured at 80%.  3. No previous study available for comparison     Echocardiogram 12/22/2016  Interpretation Summary  Left ventricular size is normal.  The Ejection Fraction is estimated at 35-40%.  Inferior wall akinesis is present.  Septal,basal to mid anterior wall akinesis.  Right ventricular function, chamber size, wall motion, and thickness are  normal.  Compared to prior study,the wall motion abnormality is new.    Cardiac catheterization 9/17/2016  Diagnosis  1. 1-vessel coronary artery disease (RCA), without left main lesion.  2. Successful angioplasty (COLLEEN) of the proximal RCA.  3. Successful angioplasty (COLLEEN) of the mid RCA.    Outside records reviewed from: NA    ASSESSMENT and PLAN  Sonya Foote is a 67 year old female scheduled to undergo Sonya Fooet is a 67 year old female scheduled for Cystoscopy with Suprapubic Tube Placement with Dr. Oliver on 11/13/2017 at [facility] under [place].  Ms. Foote has a very complex patient with left and right AKA, 6/2016 and 11/2016, respectively.  Since her amputations, she has found toileting quite difficult when out and about as she report handicap restroom stalls are not compatible with a person who has had bilateral AKA.  This has significantly impacted her social outings and presented in urology consultation on 3/22/2017 requesting a catheter.  The above procedure was offered to patient by Dr. Oliver and she would like to proceed.      Ms. Foote presents to PAC and would like to proceed with above procedure.  She denies any chest pain, PND, dyspnea, cough, sore throat, fever or change in bowel or bladder habits.       PAC referral for risk assessment and optimization of anesthesia with comorbid conditions of:  CAD, s/p inferior MI with COLLEEN to pRCA & mRCA 9/17/2016; HTN; HLD; PVD, s/p bilateral AKA;  h/o LLE blood clot prior to amputation; sickle cell trait; h/o  anemia;h/o transfusion (hives associated with); JOSE; COPD; asthma; GERD; dysphagia with history of esophageal stricture, s/p dilatiion, NIDDM;neuropahty; chronic low back pain; DDD; chronic pain with intrathecal pump; seizure disorder; h/o CVA, s/p stent to left carotid; depression/anxiety; and schizoaffective disorder.     + Transgender. 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  11 % risk of major adverse cardiac event. METS ~1.  Patient is WC bound >> is able to dress herself.       - CAD, s/p inferior MI with COLLEEN to pRCA and mRCA 9/17/2017.  On ASA.  Completed 12 mo plavix.   Discussed with Dr. Anderson, cardiology: safe to stay off Plavix despite mildly abnormal Yancy scan.  Instructed to stay on ASA.        - HTN, take beta blocker DOS.  Hold ACE-I and diuretic DOS.         - HLD, take statin as prescribed DOS       - Cerebral vascular disease, h/o CVA X3  with residual of left eye blindness.  Status post stent to left carotid.  Carotid US less than 50% stenosis bilat.        - Heart failure - EF 35-40% (12/2016)--> Yancy 10/2017 EF 80%       - PVD, s/p AKA left 6/2016 and right 11/2016  Pulmonary - remote smoking history, ~ 40 pack years       - JOSE, uses CPAP with O2 at night.  Will bring with DOS       - COPD/asthma, stable on scheduled inhalers.  Use inhalers DOS and bring to hospital       - 06/11/16; 1138; Mask Ventilation: Easy; Ease of Intubation: Easy; Airway Size: 7;  Cuffed;  Oral;  Blade Type: Sweet;  Blade Size: 2;  Insertion Attempts: 1;  Secured at (cm)to lip:  22 cm;  Breath Sounds:   Equal, clear and bilateral;  End Tidal CO2: Present;  Dentition: Intact;  Grade View of Cords: 1  Hematology - h/o anemia. Take FeSO4. Denies hives with blood transfusions (previously reported).        - Sickle Cell trait  GI - GERD, take PPI  DOS and hold Carafate       -  "Esophageal stricture with h/o dilation       - H/O Esophageal candidiasis, last EGD 1/2017       - Risk of PONV score = 2.  If > 2, anti-emetic intervention recommended.  Renal - Cr 0.5, GFR>90  Endocrine - NIDDM, diet managed.  HgbA1C 4.5.  Musculoskeletal - Recommend careful positioning given chronic pain.   Neuro - Seizure disorder, take Keppra and phenytoin.  Petit mal seizures daily (forgets what is being said and \"spaces out\").  Grand Mal >10years ago.       - Neuropathy, take pregabalin DOS        - Depression/anxiety, take antidepressants DOS       - Schizoaffective disorder       - Cont. Antiepileptics:  Dilantin and Keppra,     Phenytoin Level: 11.7    Phenytoin Free: 0.94 (L)    HEENT - Legally blind (no peripheral vision).  Edentulous.  Limited neck ROM.   - plan for suprapubic tube placement for functional urinary incontinence.  St. Cath for UA today--> positive. UC pending.   Message sent to urology for treatment and ? Delay surgery.   ID - low grade temp.  ? Atelectasis.  IS given with instruction.  UC pending.  Patient feels well.  No dysuria.  Instructed to monitor and if worsens to contact provider.     **Chronic Pain:  Has intrathecal pump with Fentanyl, MS and Bupivacaine.  See Pharmacy note.     For complete medication and diet instructions for surgery, please refer to the AVS completed by nursing. UC pending.      Patient was discussed with Dr Quintero.    Ofelia Mcduffie PA-C  Preoperative Assessment [facility]  [facility]  Phone: [phone]  Fax: [phone]  "

## 2017-11-06 NOTE — ANESTHESIA PREPROCEDURE EVALUATION
"  Anesthesia Evaluation     . Pt has had prior anesthetic. Type: General, MAC and Regional    No history of anesthetic complications          ROS/MED HX    ENT/Pulmonary: Comment: [place] - uses \"Pocket talker\" to enhance hearing.      (+)sleep apnea, allergic rhinitis, vocal cord abnormalities- Horseness, tobacco use (Quit 15 years ago.  Smoked 1-2 PPD for 30 years.), Current use 3 cig per day packs/day  Moderate Persistent asthma Last exacerbation: > 1year ago,Treatment: Inhaled steroids, Nebulizer daily and Inhaler prn,  moderate COPD, O2 dependent, during Nighttime 2L with CPAP liters/min,  uses CPAP on Oxygen 2L at night cmH2O , . .   : Wears corrective lenses.  Cannot see out of left eye.  Limited right eye vision with no peripheral vision. Legally blind.    Neurologic:     (+)neuropathy - hands, seizures last seizure:  Petit Mal 4-5  time per day.  Grand Mal - last 2005. features: \"stops talking and forgets what was said\", CVA (Cerebral vascular dz,s/p stent to left carotid artery 2005.) date: 2005, 2007 & 2008 with deficits- Presented with left side weakness and peripheral vision loss.    , other neuro Legally blind (no peripheral vision)  RLS. BPPV.  Chronic back pain    Cardiovascular: Comment: PVD and thrombosis of LLE, s/p left AKA 6/6/2016   PVD RLE, s/p right AKA 11/23/2016.  Left carotid stent.  Carotid US 10/2017: < 50% bilat stenosis.       (+) Dyslipidemia, hypertension-range: variable, Peripheral Vascular Disease-- Carotid Stenosis, CAD, -past MI (Inferior MI 9/2016.),-stent (s/p COLLEEN to pRCA and mRCA),9/2016   2 Drug Eluting Stent .. Taking blood thinners Pt has received instructions: Instructions Given to patient: stay on ASA. CHF (Plavix started September 2016 post stent placement.   ASA daily.) etiology: diastolic heart failure Last EF: 80%  date: 10/2017 . . :. . Previous cardiac testing Echodate:see belowresults:Stress Testdate: results:ECG reviewed date: results:Cath date: results:         (-) " angina, BEDOYA and angina   METS/Exercise Tolerance: Comment: Patient lives in assisted living.  Is able to dress herself, but does need help with showering. 1 - Eating, dressing   Hematologic:     (+) History of blood clots (Left LE  prior to AKA.) pt is not anticoagulated, Anemia, History of Transfusion no previous transfusion reaction Other Hematologic Disorder-Sickle cell trait      Musculoskeletal: Comment: DDD - severe  Chronic low back pain  (+) arthritis (hands), , , -       GI/Hepatic: Comment: Chronic dysphagia   Esophageal strictures and previous dilations.    (+) GERD Asymptomatic on medication,      Acute appendicitis: s/p appendectomy. Cholecystitis/cholelithiasis: s/p cholecystectomy.   Renal/Genitourinary:     (+) Other Renal/ Genitourinary, Frequent UTI's.  Functional incontinence      Endo:     (+) type II DM Last HgA1c: 4.5 date: 8/2017 Not using insulin - not using insulin pump Normal glucose range: 140-200 (trending up couple months) not previously admitted for DM/DKA Diabetic complications: neuropathy, .      Psychiatric:     (+) psychiatric history anxiety, depression and other (comment) (Schizoaffective disorder)      Infectious Disease: Comment: 99.2 (low grade fever) on 11/6/17  (+) Recent Fever,       Malignancy:      - no malignancy   Other: Comment: Chronic pain from neuropathy and PVD  Intrathecal pump with bupivacaine and fentanyl located in right lower back.   (+) No chance of pregnancy C-spine cleared: N/A, H/O Chronic Pain,H/O chronic opiod use , other significant disability Wheelchair bound and Blind                   Physical Exam      Airway   Mallampati: III  TM distance: >3 FB  Neck ROM: limited    Dental   Comment: edentulous    Cardiovascular   Rhythm and rate: regular and normal      Pulmonary    breath sounds clear to auscultation    Other findings: INTRATHECAL PUMP right lower back.    10/11/2017 06:15  Sodium: 140  Potassium: 3.6  Chloride: 108  Carbon Dioxide: 25  Urea  Nitrogen: 6 (L)  Creatinine: 0.51 (L)  GFR Estimate: >90  GFR Estimate If Black: >90  Calcium: 8.9  Anion Gap: 7  Glucose: 90    WBC: 8.3  Hemoglobin: 10.9 (L)  Hematocrit: 33.4 (L)  Platelet Count: 270  RBC Count: 3.88  MCV: 86    10/23/2017 11:51  Phenytoin Level: 11.7  Phenytoin Free: 0.94 (L)      EKG 10/8/17:  NSR with LVH    US CAROTID BILATERAL 10/2017:  1. Right carotid:  less than 50% stenosis   2. Left carotid stent: Patent left internal carotid artery stent with less than 50% stenosis      Yancy stress test 10/2/17:  Impression  1.  Myocardial perfusion imaging using single isotope technique  demonstrated a small, basal inferior nontransmural infarction and a  very small area of mild ischemia involving the distal inferolateral  wall and apex.   2. Gated images demonstrated mild basal inferior hypokinesis.  The  left ventricular systolic function is normal, with an ejection  fraction measured at 80%.  3. No previous study available for comparison     Echocardiogram 12/22/2016  Interpretation Summary  Left ventricular size is normal.  The Ejection Fraction is estimated at 35-40%.  Inferior wall akinesis is present.  Septal,basal to mid anterior wall akinesis.  Right ventricular function, chamber size, wall motion, and thickness are  normal.  Compared to prior study,the wall motion abnormality is new.    Cardiac catheterization 9/17/2016  Diagnosis  1. 1-vessel coronary artery disease (RCA), without left main lesion.  2. Successful angioplasty (COLLEEN) of the proximal RCA.  3. Successful angioplasty (COLLEEN) of the mid RCA.                 PAC Discussion and Assessment    ASA Classification: 3  Case is suitable for: [place]  Anesthetic techniques and relevant risks discussed: GA  Invasive monitoring and risk discussed: No  Types:   Possibility and Risk of blood transfusion discussed: No  NPO instructions given:   Additional anesthetic preparation and risks discussed:   Needs early admission to pre-op area:    Other:     PAC Resident/NP Anesthesia Assessment:  Sonya Foote is a 67 year old female scheduled for Cystoscopy with Suprapubic Tube Placement with Dr. Oliver on 11/13/2017 at [facility] under [place].  Ms. Foote has a very complex patient with left and right AKA, 6/2016 and 11/2016, respectively.  Since her amputations, she has found toileting quite difficult when out and about as she report handicap restroom stalls are not compatible with a person who has had bilateral AKA.  This has significantly impacted her social outings and presented in urology consultation on 3/22/2017 requesting a catheter.  The above procedure was offered to patient by Dr. Oliver and she would like to proceed.      Ms. Foote presents to PAC and would like to proceed with above procedure.  She denies any chest pain, PND, dyspnea, cough, sore throat, fever or change in bowel or bladder habits.       PAC referral for risk assessment and optimization of anesthesia with comorbid conditions of:  CAD, s/p inferior MI with COLLEEN to pRCA & mRCA 9/17/2016; HTN; HLD; PVD, s/p bilateral AKA;  h/o LLE blood clot prior to amputation; sickle cell trait; h/o anemia;h/o transfusion (hives associated with); JOSE; COPD; asthma; GERD; dysphagia with history of esophageal stricture, s/p dilatiion, NIDDM;neuropahty; chronic low back pain; DDD; chronic pain with intrathecal pump; seizure disorder; h/o CVA, s/p stent to left carotid; depression/anxiety; and schizoaffective disorder.     + Transgender. 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  11 % risk of major adverse cardiac event. METS ~1.  Patient is WC bound >> is able to dress herself.       - CAD, s/p inferior MI with COLLEEN to pRCA and mRCA 9/17/2017.  On ASA.  Completed 12 mo plavix.   Discussed with Dr. Anderson, cardiology. Safe to stay  "off Plavix despite mildly abnormal Yancy scan.  Instructed to stay on ASA.        - HTN, take beta blocker DOS.  Hold ACE-I and diuretic DOS.         - HLD, take statin as prescribed DOS       - Cerebral vascular disease, h/o CVA X3  with residual of left eye blindness.  Status post stent to left carotid.  Carotid US less than 50% stenosis bilat.        - Heart failure - EF 35-40% (12/2016)--> Yancy 10/2017 EF 80%       - PVD, s/p AKA left 6/2016 and right 11/2016  Pulmonary - remote smoking history, ~ 40 pack years       - JOSE, uses CPAP with O2 at night.  Will bring with DOS       - COPD/asthma, stable on scheduled inhalers.  Use inhalers DOS and bring to hospital       - 06/11/16; 1138; Mask Ventilation: Easy; Ease of Intubation: Easy; Airway Size: 7;  Cuffed;  Oral;  Blade Type: Sweet;  Blade Size: 2;  Insertion Attempts: 1;  Secured at (cm)to lip:  22 cm;  Breath Sounds:   Equal, clear and bilateral;  End Tidal CO2: Present;  Dentition: Intact;  Grade View of Cords: 1  Hematology - h/o anemia. Take FeSO4. Denies hives with blood transfusions (previously reported).        - Sickle Cell trait  GI - GERD, take PPI  DOS and hold Carafate       - Esophageal stricture with h/o dilation       - H/O Esophageal candidiasis, last EGD 1/2017       - Risk of PONV score = 2.  If > 2, anti-emetic intervention recommended.  Renal - Cr 0.5, GFR>90  Endocrine - NIDDM, diet managed.  HgbA1C 4.5.  Musculoskeletal - Recommend careful positioning given chronic pain.   Neuro - Seizure disorder, take Keppra and phenytoin.  Petit mal seizures daily (forgets what is being said and \"spaces out\").  Grand Mal >10years ago.       - Neuropathy, take pregabalin DOS        - Depression/anxiety, take antidepressants DOS       - Schizoaffective disorder       - Cont. Antiepileptics:  Dilantin and Keppra,     Phenytoin Level: 11.7    Phenytoin Free: 0.94 (L)    HEENT - Legally blind (no peripheral vision).  Edentulous.  Limited neck ROM.   - " plan for suprapubic tube placement for functional urinary incontinence.  St. Cath for UC today.  + UA, leukocytosis.  Urology notified.     **Chronic Pain:  Has intrathecal pump with Fentanyl, MS and Bupivacaine.  See Pharmacy note.   DNR - confirmed.         Reviewed and Signed by PAC Mid-Level Provider/Resident  Mid-Level Provider/Resident: Paty DOW CNP  Date: 5/19/2017  Time: 17:25    Attending Anesthesiologist Anesthesia Assessment:  STAFF:  Very complex 67 y.o. woman with urinary voiding challenges for SUPRAPUBIC TUBE by Dr. OLIVER  using general anesthesia.   History summarized above.  Relevant Issues are:  1. Complex cardiac hx with prior MI and CLOLEEN, one year on Plavix and ASA completed. Plavix is now stopped.   2. Repeat LEXISCAN showed very minor apical ischemia (October, 2017), but patient is asymptomatic. LV function is back to normal.  3. Bilateral leg amputations  4. Seizure disorder controlled on Keppra and Dilantin  5. Sickle Cell TRAIT  6. Ambiguous genitalia-- raised and lives as woman  7. Back smoking a few cigarettes a day; smoking cessation advised.   EXAM = edentulous; no JVD  Instructions given and questions answered.   Final plans by anesthesiology team on DOS.   ---rcp      Reviewed and Signed by PAC Anesthesiologist  Anesthesiologist: rcp  Date: 11/6  Time:   Pass/Fail: Pass  Disposition:     PAC Pharmacist Assessment:  Pain Medication Clarification Note - PAC Pharmacist  Sonya Foote was seen and interviewed during time of PAC Clinic appointment on November 6, 2017 in preparation for the planned procedure with Dr Oliver on 11/13/17 at [facility] for a cyctostomy, suprapubic tube placement.      The current plan is for the procedure to be a same day procedure with patient discharging home after the procedure.  The purpose of this note is to verify the patient's home pain regimen.  If the plan changes and the patient is to be admitted to inpatient status postoperatively please consult the  inpatient pain management service for specific pain management recommendations (available at [phone] from 8 AM - 3 PM Mon - Fri and available via phone answering service 24/7 at [phone]).      Based on [place] Prescription Monitoring Profile and patient interview:      - OUTPATIENT MEDICATIONS (related to pain management):  -- Long-acting opioid: none  -- Short-acting opioid: none  -- Intrathecal pump:                        Managed by [facility] in [place]                        Provider:  Dr. Trejo                        Pump contains:  Fentanyl 795.9 mcg/day, bupivacaine                7.959 mg/day, morphine  0.7959 mg/day                        Patient administered bolus:  Fentanyl 59.9 mcg,             Bupivacaine 0.599 mg, morphine 0.0599 mg, patient may               use up to 7 activations per day.  Patient averages 2-3                boluses per day.                        Pump last refilled:  10/30/17                        Next refill due:  1/2/18  -- Oral adjuvant(s): Pregabalin  -- Topicals: Diclofenac gel  -- Bowel Regimen: Polyethylene glycol prn       Verbal consent was given by patient to access pharmacy records and [place] Prescription Monitoring Profile: No     If Sonya Foote is admitted the inpatient pain service will follow up on patient after consult is placed and offer further recommendations for pain management.  If immediate assistance is needed please contact the Inpatient Pain Management Service: Call [phone] after hours, weekends and holidays or page [phone] from 8 AM - 3 PM Mon - Fri.    Reviewed and Signed by PAC Pharmacist  Pharmacist: Enriqueta Kee  Date: November 6, 2017  Time:10:14am                           .

## 2017-11-06 NOTE — PROGRESS NOTES
[facility] medication history for November 6, 2017 is complete.    See Epic admission navigator for allergy information, pharmacy and prior to admission medications.    Operating room staff will still need to confirm medications and last dose information on day of surgery.     Medication history interview sources:   Patient, medication list form assisted living    Changes made to PTA medication list (reason)  Added: None  Deleted: None  Changed: IT pump now includes morphine in addition to fentanyl and bupivacaine.    Additional medication history information (including reliability of information, actions taken by pharmacist):None    -- No recent (within 30 days) course of antibiotics  -- No recent (within 30 days) course of steroids  -- No recent (within 30 days) chronic daily medications stopped     Prior to Admission medications    Medication Sig Last Dose Taking? Auth Provider   brimonidine (ALPHAGAN) 0.2 % ophthalmic solution Place 1 drop into the right eye 3 times daily  Patient taking differently: Place 1 drop into the right eye 2 times daily  Taking Yes Marely Robin MD   dorzolamide-timolol (COSOPT) 2-0.5 % ophthalmic solution Place 1 drop into the right eye 2 times daily Taking Yes Marely Robin MD   sucralfate (CARAFATE) 1 GM tablet Take 1 tablet (1 g) by mouth 4 times daily May dissolve in 10 mL water is necessary. (Start upon completion of carafate suspension) Taking Yes Allan Casey MD   escitalopram (LEXAPRO) 5 MG tablet Take 2 tablets (10 mg) by mouth daily Taking Yes Allan Casey MD   albuterol (PROAIR HFA/PROVENTIL HFA/VENTOLIN HFA) 108 (90 BASE) MCG/ACT Inhaler Inhale 2 puffs into the lungs every 6 hours as needed for shortness of breath / dyspnea or wheezing Taking Yes Allan Casey MD   umeclidinium (INCRUSE ELLIPTA) 62.5 MCG/INH oral inhaler Inhale 1 puff into the lungs daily Taking Yes Unknown, Entered By History   ONDANSETRON PO Take 4 mg by mouth  3 times daily Taking Yes Unknown, Entered By History   lidocaine (LIDODERM) 5 % Patch APPLY 1 TO 3 PATCHES TO PAINFUL AREA AT ONCE FOR UP TO 12 HOURS WITHIN A 24 HOUR PERIOD REMOVE AFTER 12 HOURS Taking Yes Allan Casey MD   metoprolol (TOPROL-XL) 25 MG 24 hr tablet TAKE 1 TABLET (25 MG) BY MOUTH DAILY Taking Yes Allan Casey MD   traZODone (DESYREL) 100 MG tablet Take 1.5 tablets (150 mg) by mouth At Bedtime Taking Yes Allan Casey MD   polyethylene glycol (MIRALAX/GLYCOLAX) Packet Take 17 g by mouth daily as needed Dissolved in water or juice  Taking Yes Unknown, Entered By History   ACETAMINOPHEN PO Take 1,000 mg by mouth every 6 hours as needed for fever Taking q4-6 hours, per MAR Taking Yes Unknown, Entered By History   pregabalin (LYRICA) 75 MG capsule Take 2 capsules (150 mg) by mouth 2 times daily Taking Yes Allan Casey MD   lisinopril (PRINIVIL/ZESTRIL) 10 MG tablet Take 1 tablet (10 mg) by mouth daily Taking Yes Bj Anderson MD   pantoprazole (PROTONIX) 40 MG EC tablet Take 40 mg by mouth daily Taking Yes Reported, Patient   risperiDONE (RISPERDAL) 0.5 MG tablet Take 0.5 mg by mouth At Bedtime Taking Yes Reported, Patient   ferrous sulfate (IRON) 325 (65 FE) MG tablet Take 1 tablet (325 mg) by mouth 2 times daily With meals Taking Yes Allan Casey MD   albuterol (2.5 MG/3ML) 0.083% neb solution INHALE 1 VIAL VIA NEBULIZER EVERY 6 HOURS AS NEEDED Taking Yes Allan Casey MD   Nutritional Supplements (RENÉE) PACK Take 1 packet by mouth 2 times daily Taking Yes Reported, Patient   fluticasone (FLONASE) 50 MCG/ACT nasal spray Spray 1 spray into both nostrils daily Taking Yes Reported, Patient   ADVAIR DISKUS 250-50 MCG/DOSE diskus inhaler Inhale 1 puff into the lungs 2 times daily  Taking Yes Reported, Patient   hydrochlorothiazide (MICROZIDE) 12.5 MG capsule Take 1 capsule (12.5 mg) by mouth daily  Patient taking differently: Take 12.5 mg by mouth daily Hold for sbp<90 Taking  Yes Allan Casey MD   estradiol (ESTRACE) 1 MG tablet Take 1 tablet (1 mg) by mouth daily Taking Yes Allan Casey MD   levETIRAcetam (KEPPRA) 500 MG tablet Take 1 tablet (500 mg) by mouth 2 times daily Taking Yes Allan Casey MD   aspirin EC 81 MG EC tablet Take 1 tablet (81 mg) by mouth daily  Patient taking differently: Take 81 mg by mouth every morning  Taking Yes Daria Mancilla MD   phenytoin 200 MG CAPS Take 200 mg by mouth 2 times daily  Patient taking differently: Take 200 mg by mouth 2 times daily (takes at 8 AM and 8 PM) Taking Yes Noah Blum MD   zinc 50 MG TABS Take 1 tablet by mouth daily Taking Yes Reported, Patient   nitroglycerin (NITROSTAT) 0.4 MG SL tablet Place 1 tablet (0.4 mg) under the tongue every 5 minutes as needed for chest pain if you are still having symptoms after 3 doses (15 minutes) call 911. Taking Yes Allan Casey MD   latanoprost (XALATAN) 0.005 % ophthalmic solution Place 1 drop into both eyes At Bedtime Taking Yes Kendrick Wells MD   atorvastatin (LIPITOR) 40 MG tablet Take 1 tablet (40 mg) by mouth daily  Patient taking differently: Take 40 mg by mouth At Bedtime  Taking Yes Allan Casey MD   prochlorperazine (COMPAZINE) 10 MG tablet Take 1 tablet (10 mg) by mouth every 8 hours as needed Taking Yes Allan Casey MD   Multiple Vitamin (MULTIVITAMIN OR) Take 1 tablet by mouth daily  Taking Yes Reported, Patient   cyclobenzaprine (FLEXERIL) 10 MG tablet Take 1 tablet (10 mg) by mouth 2 times daily as needed for muscle spasms   Allan Casey MD   blood glucose monitoring (ONE TOUCH ULTRASOFT) lancets Use to test blood sugar 3 times daily or as directed.   Allan Casey MD   blood glucose monitoring (ONE TOUCH ULTRA) test strip Use to test blood sugars 3 times daily or as directed.   Allan Casey MD   order for DME Full electric [facility] bed with half rails    Dx: [id number], I110, J449  Length of need: lifetime   Allan Casey  MD ALFRED   order for DME Wheel Chair Cushion: 18 x 18 inch Roho cushion   Allan Casey MD   order for DME Hospital bed with side rails   Allan Casey MD   cetirizine (ZYRTEC) 10 MG tablet Take 1 tablet (10 mg) by mouth daily as needed for allergies   Allan Casey MD   diclofenac (VOLTAREN) 1 % GEL topical gel Apply 2 g topically 4 times daily to hands   Allan Casey MD   EPINEPHrine (EPIPEN JR) 0.15 MG/0.3ML injection Inject 0.3 mLs (0.15 mg) into the muscle as needed for anaphylaxis   Allan Casey MD   order for DME Equipment being ordered: CPAP supplies mask, hose, filters, cushion    fax to [facility] at [phone]   Allan Casey MD   order for DME Equipment being ordered: CPAP supplies mask, hose, filters, cushion fax to [facility] at [phone]  Disp: 10 Device Refills: prn   Class: Local Print Start: 2/10/2017   Allan Casey MD   order for DME Equipment being ordered: Nebulizer and tubing supplies   Alina Jennings MD   CYANOCOBALAMIN PO Take 2,000 mcg by mouth daily   Reported, Patient   calcium citrate-vitamin D (CITRACAL) 315-250 MG-UNIT TABS Take 2 tablets by mouth daily   Allan Casey MD   blood glucose monitoring (ONE TOUCH ULTRA 2) meter device kit Use to test blood sugars 3 times daily or as directed.   Allan Casey MD   dorzolamide (TRUSOPT) 2 % ophthalmic solution Place 1 drop into both eyes 2 times daily    Reported, Patient   MEDICATION GIVEN BY INTRATHECAL PUMP - INSTRUCTION continuous May 19, 2017 - per [facility] in [place] [phone]:  Conc: Bupivacaine 20 mg/mL and Fentanyl 2000 mcg/mL.  Continuous: Bupivacaine 7.265 mg/day and Fentanyl 726.5 mcg/day.  Boluses: Up to 7 boluses per 24-hr period of Bupivicaine 0.599 mg and Fentanyl 59.9 mcg    Pump Refill Date at [facility]: 7/2/17   Unknown, Entered By History   order for DME Equipment being ordered: Glucerna daily shakes with each meal   Jacinto  Allan SIMON MD   ORDER FOR DME Equipment being ordered: Power Wheelchair   Allan Casey MD   ORDER FOR DME Equipment being ordered: Depends briefs   Allan Casey MD   Cholecalciferol (VITAMIN D) 2000 UNITS tablet Take 2,000 Units by mouth daily.   Reported, Patient         Medication history completed by: Enriqueta Kee [facility]

## 2017-11-06 NOTE — MR AVS SNAPSHOT
After Visit Summary   11/6/2017    Sonya Foote    MRN: [id number]           Patient Information     Date Of Birth          1950        Visit Information        Provider Department      11/6/2017 8:00 AM Pharmacist, Jose Carlos Miguel [facility] Preoperative Assessment [place]        Today's Diagnoses     Preop examination    -  1       Follow-ups after your visit        Your next 10 appointments already scheduled     Nov 06, 2017  9:30 AM CST   (Arrive by 9:15 AM)   PAC RN ASSESSMENT with  Pac Rn   [facility] Preoperative Assessment [place] ([facility])    [address] [place] [address]  4th [facility] [address]   [phone]            Nov 06, 2017 10:10 AM CST   (Arrive by 9:55 AM)   PAC Anesthesia Consult with  Pac Anesthesiologist   [facility] Assessment [place] ([facility])    [address] [place] [address]   [phone]            Nov 06, 2017 10:45 AM CST   LAB with  LAB   [facility] Lab ([facility])    [address] [place] [address]   [phone]           Please do not eat 10-12 hours before your appointment if you are coming in fasting for labs on lipids, cholesterol, or glucose (sugar). This does not apply to pregnant women. Water, hot tea and black coffee (with nothing added) are okay. Do not drink other fluids, diet soda or chew gum.            Nov 10, 2017  9:20 AM CST   (Arrive by 9:05 AM)   RETURN DIABETES with Michelle Irizarry MD   [facility] Endocrinology ([facility])    [address] [place] [address]  3rd [facility] [address]   [phone]            Nov 13, 2017   Procedure with Elizabeth Oliver MD   [facility], [facility], Same Day Surgery (--)    [address] [facility] [address]   [phone]            Dec 06, 2017 10:00 AM CST   Evaluation with Manuela Mitchell OT   [facility]  [facility] ([facility] [place])    [address] [facility] [address]  9th Floor, Clinic 9a  [facility] [address]   [phone]            Dec 14, 2017  1:45 PM CST   (Arrive by 1:30 PM)   Post-Op with Elizabeth Oliver MD   [facility] Urology and Inst for Prostate and Urologic Cancers ([facility])    [address]  4th Floor  [facility] [address] [phone]            Dec 29, 2017 10:00 AM CST   (Arrive by 9:45 AM)   New Patient Visit with VICTOR M Floyd CNP   [facility] Gastroenterology and IBD Clinic ([facility])    [address]  4th Floor  [facility] [address]   [phone]            Feb 19, 2018  9:30 AM CST   (Arrive by 9:15 AM)   Return Visit with Alina Jennings MD   [facility] ([facility])    [address]  3rd Floor  [facility] [address]   [phone]            Mar 06, 2018  9:15 AM CST   VISUAL FIELD with [facility] EYE VISUAL FIELD   Eye Clinic ([facility])    [address]  [address] [facility]  [address] Clin [address]a  [facility] [address] [phone]              Who to contact     Please call your clinic at [phone] to:    Ask questions about your health    Make or cancel appointments    Discuss your medicines    Learn about your test results    Speak to your doctor   If you have compliments or concerns about an experience at your clinic, or if you wish to file a complaint, please contact [facility] Patient Relations at [phone] or email us at [email or web address]         Additional Information About Your Visit        Job[employer] Information     ATOMOO is an electronic gateway that provides easy, online access to your medical records. With ATOMOO, you can request a clinic appointment, read your test results, renew a prescription or communicate  with your care team.     To sign up for [employer] visit the website at [email or web address]   You will be asked to enter the access code listed below, as well as some personal information. Please follow the directions to create your username and password.     Your access code is: [email or web address]  Expires: 2018 11:09 AM     Your access code will  in 90 days. If you need help or a new code, please contact your [facility] or call [phone] for assistance.        Care EveryWhere ID     This is your Care EveryWhere ID. This could be used by other organizations to access your [facility] medical records  [id number]         Blood Pressure from Last 3 Encounters:   17 97/66   10/23/17 140/80   10/18/17 108/62    Weight from Last 3 Encounters:   17 60.3 kg (133 lb)   10/23/17 61.2 kg (135 lb)   10/18/17 61.2 kg (135 lb)              Today, you had the following     No orders found for display         Today's Medication Changes          These changes are accurate as of: 17  8:46 AM.  If you have any questions, ask your nurse or doctor.               These medicines have changed or have updated prescriptions.        Dose/Directions    aspirin 81 MG EC tablet   This may have changed:  when to take this   Used for:  Unstable angina (H)        Dose:  81 mg   Take 1 tablet (81 mg) by mouth daily   Quantity:  90 tablet   Refills:  3       atorvastatin 40 MG tablet   Commonly known as:  LIPITOR   This may have changed:  when to take this   Used for:  Hyperlipidemia LDL goal <100        Dose:  40 mg   Take 1 tablet (40 mg) by mouth daily   Quantity:  90 tablet   Refills:  3       brimonidine 0.2 % ophthalmic solution   Commonly known as:  ALPHAGAN   This may have changed:  when to take this   Used for:  Primary open angle glaucoma of both eyes, severe stage        Dose:  1 drop   Place 1 drop into the right eye 3 times daily   Quantity:  15 mL   Refills:  11        hydrochlorothiazide 12.5 MG capsule   Commonly known as:  MICROZIDE   This may have changed:  additional instructions   Used for:  Essential hypertension with goal blood pressure less than 130/80        Dose:  12.5 mg   Take 1 capsule (12.5 mg) by mouth daily   Quantity:  90 capsule   Refills:  3       latanoprost 0.005 % ophthalmic solution   Commonly known as:  XALATAN   This may have changed:  Another medication with the same name was removed. Continue taking this medication, and follow the directions you see here.   Used for:  Primary open angle glaucoma, stage unspecified   Changed by:  Azael Arriaga MD        Dose:  1 drop   Place 1 drop into both eyes At Bedtime   Quantity:  2.5 mL   Refills:  11       Phenytoin Sodium Extended 200 MG Caps   This may have changed:  additional instructions   Used for:  Seizure disorder (H)        Dose:  200 mg   Take 200 mg by mouth 2 times daily   Quantity:  60 capsule   Refills:  0       pregabalin 75 MG capsule   Commonly known as:  LYRICA   This may have changed:  Another medication with the same name was removed. Continue taking this medication, and follow the directions you see here.   Used for:  Pain in both upper extremities   Changed by:  Elizabeth Rose RPH        Dose:  150 mg   Take 2 capsules (150 mg) by mouth 2 times daily   Quantity:  120 capsule   Refills:  5       traZODone 100 MG tablet   Commonly known as:  DESYREL   This may have changed:  Another medication with the same name was removed. Continue taking this medication, and follow the directions you see here.   Used for:  Anxiety state   Changed by:  Elizabeth Rose RPH        Dose:  150 mg   Take 1.5 tablets (150 mg) by mouth At Bedtime   Quantity:  90 tablet   Refills:  3                Primary Care Provider Office Phone # Fax #    Allan Casey [phone]       [address] [place] [phone]        Equal Access to Services     KARI CARREON AH: John bowers  [place], carlozda lunoniadaha, gitata kadiana encarnacion, tonie woodall fayeyomi chaorossana cara. So [place] [phone].    ATENCIÓN: Si papito snider, tiene a briones disposición servicios gratuitos de asistencia lingüística. Meño al [phone].    We comply with applicable federal civil rights laws and [place] laws. We do not discriminate on the basis of race, color, national origin, age, disability, sex, sexual orientation, or gender identity.            Thank you!     Thank you for choosing [facility]  for your care. Our goal is always to provide you with excellent care. Hearing back from our patients is one way we can continue to improve our services. Please take a few minutes to complete the written survey that you may receive in the mail after your visit with us. Thank you!             Your Updated Medication List - Protect others around you: Learn how to safely use, store and throw away your medicines at www.disposemymeds.org.          This list is accurate as of: 11/6/17  8:46 AM.  Always use your most recent med list.                   Brand Name Dispense Instructions for use Diagnosis    ACETAMINOPHEN PO      Take 1,000 mg by mouth every 6 hours as needed for fever Taking q4-6 hours, per MAR        ADVAIR DISKUS 250-50 MCG/DOSE diskus inhaler   Generic drug:  fluticasone-salmeterol      Inhale 1 puff into the lungs 2 times daily        * albuterol (2.5 MG/3ML) 0.083% neb solution     360 mL    INHALE 1 VIAL VIA NEBULIZER EVERY 6 HOURS AS NEEDED    Chronic obstructive pulmonary disease, unspecified COPD type (H)       * albuterol 108 (90 BASE) MCG/ACT Inhaler    PROAIR HFA/PROVENTIL HFA/VENTOLIN HFA    3 Inhaler    Inhale 2 puffs into the lungs every 6 hours as needed for shortness of breath / dyspnea or wheezing    JOSE (obstructive sleep apnea)       aspirin 81 MG EC tablet     90 tablet    Take 1 tablet (81 mg) by mouth daily    Unstable angina (H)       atorvastatin 40 MG  tablet    LIPITOR    90 tablet    Take 1 tablet (40 mg) by mouth daily    Hyperlipidemia LDL goal <100       blood glucose monitoring lancets     100 each    Use to test blood sugar 3 times daily or as directed.    Type 2 diabetes mellitus with diabetic peripheral angiopathy without gangrene, without long-term current use of insulin (H)       blood glucose monitoring meter device kit     1 kit    Use to test blood sugars 3 times daily or as directed.    Type 2 diabetes, HbA1C goal < 8% (H)       blood glucose monitoring test strip    ONETOUCH ULTRA    3 Box    Use to test blood sugars 3 times daily or as directed.    Type 2 diabetes mellitus with diabetic peripheral angiopathy without gangrene, without long-term current use of insulin (H)       brimonidine 0.2 % ophthalmic solution    ALPHAGAN    15 mL    Place 1 drop into the right eye 3 times daily    Primary open angle glaucoma of both eyes, severe stage       calcium citrate-vitamin D 315-250 MG-UNIT Tabs per tablet    CITRACAL    120 tablet    Take 2 tablets by mouth daily    Osteoporosis       cetirizine 10 MG tablet    zyrTEC    90 tablet    Take 1 tablet (10 mg) by mouth daily as needed for allergies    Seasonal allergic rhinitis, unspecified allergic rhinitis trigger       CYANOCOBALAMIN PO      Take 2,000 mcg by mouth daily        cyclobenzaprine 10 MG tablet    FLEXERIL    30 tablet    Take 1 tablet (10 mg) by mouth 2 times daily as needed for muscle spasms    Cervicalgia       diclofenac 1 % Gel topical gel    VOLTAREN    100 g    Apply 2 g topically 4 times daily to hands    Primary osteoarthritis of both hands       dorzolamide 2 % ophthalmic solution    TRUSOPT     Place 1 drop into both eyes 2 times daily        dorzolamide-timolol 2-0.5 % ophthalmic solution    COSOPT    1 Bottle    Place 1 drop into the right eye 2 times daily    Primary open angle glaucoma of both eyes, severe stage       EPINEPHrine 0.15 MG/0.3ML injection 2-pack    EPIPEN JR     0.6 mL    Inject 0.3 mLs (0.15 mg) into the muscle as needed for anaphylaxis    Bee sting reaction, undetermined intent, subsequent encounter       escitalopram 5 MG tablet    LEXAPRO    60 tablet    Take 2 tablets (10 mg) by mouth daily    Adjustment disorder with depressed mood       estradiol 1 MG tablet    ESTRACE    90 tablet    Take 1 tablet (1 mg) by mouth daily    Person who has had sex change operation       ferrous sulfate 325 (65 FE) MG tablet    IRON    60 tablet    Take 1 tablet (325 mg) by mouth 2 times daily With meals        fluticasone 50 MCG/ACT spray    FLONASE     Spray 1 spray into both nostrils daily        hydrochlorothiazide 12.5 MG capsule    MICROZIDE    90 capsule    Take 1 capsule (12.5 mg) by mouth daily    Essential hypertension with goal blood pressure less than 130/80       INCRUSE ELLIPTA 62.5 MCG/INH oral inhaler   Generic drug:  umeclidinium      Inhale 1 puff into the lungs daily        RENÉE Pack      Take 1 packet by mouth 2 times daily        latanoprost 0.005 % ophthalmic solution    XALATAN    2.5 mL    Place 1 drop into both eyes At Bedtime    Primary open angle glaucoma, stage unspecified       levETIRAcetam 500 MG tablet    KEPPRA    180 tablet    Take 1 tablet (500 mg) by mouth 2 times daily    Nausea       lidocaine 5 % Patch    LIDODERM    30 patch    APPLY 1 TO 3 PATCHES TO PAINFUL AREA AT ONCE FOR UP TO 12 HOURS WITHIN A 24 HOUR PERIOD REMOVE AFTER 12 HOURS    Chronic pain syndrome, 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 (H)       lisinopril 10 MG tablet    PRINIVIL/ZESTRIL    90 tablet    Take 1 tablet (10 mg) by mouth daily    Essential hypertension with goal blood pressure less than 130/80       MEDICATION GIVEN BY INTRATHECAL PUMP - INSTRUCTION      continuous May 19, 2017 - per [facility] in [place] [phone]: Conc: Bupivacaine 20 mg/mL and Fentanyl 2000 mcg/mL. Continuous: Bupivacaine 7.265 mg/day and Fentanyl  726.5 mcg/day. Boluses: Up to 7 boluses per 24-hr period of Bupivicaine 0.599 mg and Fentanyl 59.9 mcg  Pump Refill Date at [facility]: 7/2/17        metoprolol 25 MG 24 hr tablet    TOPROL-XL    30 tablet    TAKE 1 TABLET (25 MG) BY MOUTH DAILY    Old myocardial infarction       MULTIVITAMIN PO      Take 1 tablet by mouth daily        nitroGLYcerin 0.4 MG sublingual tablet    NITROSTAT    25 tablet    Place 1 tablet (0.4 mg) under the tongue every 5 minutes as needed for chest pain if you are still having symptoms after 3 doses (15 minutes) call 911.    Chronic systolic congestive heart failure (H), Old myocardial infarction       ONDANSETRON PO      Take 4 mg by mouth 3 times daily        * order for DME     1 Month    Equipment being ordered: Depends briefs    Incontinence       * order for DME     1 Device    Equipment being ordered: Power Wheelchair    CVA (cerebral infarction), HTN (hypertension)       * order for DME     1 Box    Equipment being ordered: Glucerna daily shakes with each meal    Type 2 diabetes mellitus with other diabetic neurological complication       * order for DME     1 Units    Equipment being ordered: Nebulizer and tubing supplies    Simple chronic bronchitis (H)       * order for DME     1 Device    Equipment being ordered: CPAP supplies mask, hose, filters, cushion fax to [facility] at [phone] Disp: 10 DeviceRefills: prn Class: Local PrintStart: 2/10/2017    Chronic obstructive pulmonary disease, unspecified COPD type (H)       * order for DME     10 Device    Equipment being ordered: CPAP supplies mask, hose, filters, cushion  fax to [facility] at [phone]    COPD (chronic obstructive pulmonary disease) (H)       * order for DME     1 Device    Hospital bed with side rails    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 (H)       * order for DME     1 Device    Full electric hospital bed with half rails  Dx: T39308, I110, J449 Length of need:  lifetime    Status post bilateral above knee amputation (H)       * order for DME     1 Device    Wheel Chair Cushion: 18 x 18 inch Roho cushion    Status post bilateral above knee amputation (H)       pantoprazole 40 MG EC tablet    PROTONIX     Take 40 mg by mouth daily        Phenytoin Sodium Extended 200 MG Caps     60 capsule    Take 200 mg by mouth 2 times daily    Seizure disorder (H)       polyethylene glycol Packet    MIRALAX/GLYCOLAX     Take 17 g by mouth daily as needed Dissolved in water or juice        pregabalin 75 MG capsule    LYRICA    120 capsule    Take 2 capsules (150 mg) by mouth 2 times daily    Pain in both upper extremities       prochlorperazine 10 MG tablet    COMPAZINE    20 tablet    Take 1 tablet (10 mg) by mouth every 8 hours as needed    Nausea with vomiting       risperiDONE 0.5 MG tablet    risperDAL     Take 0.5 mg by mouth At Bedtime        sucralfate 1 GM tablet    CARAFATE    120 tablet    Take 1 tablet (1 g) by mouth 4 times daily May dissolve in 10 mL water is necessary. (Start upon completion of carafate suspension)    Adjustment disorder with depressed mood       traZODone 100 MG tablet    DESYREL    90 tablet    Take 1.5 tablets (150 mg) by mouth At Bedtime    Anxiety state       vitamin D 2000 UNITS tablet     100 tablet    Take 2,000 Units by mouth daily.        zinc 50 MG Tabs      Take 1 tablet by mouth daily        * Notice:  This list has 11 medication(s) that are the same as other medications prescribed for you. Read the directions carefully, and ask your doctor or other care provider to review them with you.

## 2017-11-07 ENCOUNTER — CARE COORDINATION (OUTPATIENT)
Dept: GERIATRIC MEDICINE | Facility: CLINIC | Age: 67
End: 2017-11-07

## 2017-11-07 ENCOUNTER — CARE COORDINATION (OUTPATIENT)
Dept: SURGERY | Facility: CLINIC | Age: 67
End: 2017-11-07

## 2017-11-07 ENCOUNTER — CARE COORDINATION (OUTPATIENT)
Dept: UROLOGY | Facility: CLINIC | Age: 67
End: 2017-11-07

## 2017-11-07 DIAGNOSIS — N39.0 URINARY TRACT INFECTION: Primary | ICD-10-CM

## 2017-11-07 DIAGNOSIS — Z76.89 HEALTH CARE HOME: Chronic | ICD-10-CM

## 2017-11-07 RX ORDER — CIPROFLOXACIN 500 MG/1
500 TABLET, FILM COATED ORAL 2 TIMES DAILY
Qty: 20 TABLET | Refills: 0 | Status: SHIPPED | OUTPATIENT
Start: 2017-11-07 | End: 2017-11-17

## 2017-11-07 NOTE — PROGRESS NOTES
CM spoke with member - she is anxiously awaiting suprapubic catheter placement scheduled for 11/13.  Member states that she had pre-op visit yesterday and stated that she may have a UTI however waiting on cx.  She states that she hasn't heard anything yet.  Member states she is prepared for surgery - [facility] will be involved for catheter changes.     Member states she changed apartments - she is now [address]   Will update address - sent to [employer]. [id number] sent to [facility] to update.     Jamie Sharma RN, N  [facility]

## 2017-11-07 NOTE — PROGRESS NOTES
Patient is to start Cipro 500mg BID x 10 days per Dr Oliver.  Repeat WBC on Friday.    RX sent to patient's pharmacy and nurse's station informed.    Paige Bond, EDWARDO-BC, BSN  Care Coordinator - Reconstructive Urology

## 2017-11-08 LAB
BACTERIA SPEC CULT: ABNORMAL
Lab: ABNORMAL
PHENYTOIN FREE SERPL-MCNC: 0.77 UG/ML
SPECIMEN SOURCE: ABNORMAL

## 2017-11-10 ENCOUNTER — CARE COORDINATION (OUTPATIENT)
Dept: GERIATRIC MEDICINE | Facility: CLINIC | Age: 67
End: 2017-11-10

## 2017-11-10 ENCOUNTER — OFFICE VISIT (OUTPATIENT)
Dept: ENDOCRINOLOGY | Facility: CLINIC | Age: 67
End: 2017-11-10

## 2017-11-10 VITALS — HEART RATE: 76 BPM | SYSTOLIC BLOOD PRESSURE: 144 MMHG | DIASTOLIC BLOOD PRESSURE: 84 MMHG

## 2017-11-10 DIAGNOSIS — M81.0 OSTEOPOROSIS, UNSPECIFIED OSTEOPOROSIS TYPE, UNSPECIFIED PATHOLOGICAL FRACTURE PRESENCE: ICD-10-CM

## 2017-11-10 DIAGNOSIS — N39.0 URINARY TRACT INFECTION: ICD-10-CM

## 2017-11-10 DIAGNOSIS — E11.52 TYPE 2 DIABETES MELLITUS WITH DIABETIC PERIPHERAL ANGIOPATHY AND GANGRENE, WITHOUT LONG-TERM CURRENT USE OF INSULIN (H): Primary | ICD-10-CM

## 2017-11-10 LAB
HBA1C MFR BLD: 5.1 % (ref 4.3–6)
HBA1C MFR BLD: 5.1 % (ref 4.3–6)
WBC # BLD AUTO: 9 10E9/L (ref 4–11)

## 2017-11-10 RX ORDER — METFORMIN HCL 500 MG
500 TABLET, EXTENDED RELEASE 24 HR ORAL
Qty: 90 TABLET | Refills: 3 | Status: SHIPPED | OUTPATIENT
Start: 2017-11-10 | End: 2019-06-14

## 2017-11-10 ASSESSMENT — PAIN SCALES - GENERAL: PAINLEVEL: NO PAIN (0)

## 2017-11-10 NOTE — PROGRESS NOTES
Endocrinology Note         Sonya is a 67 year old transgender female presents today for type 2 diabetes    HPI  Sonya Foote is a 67 years old transgender female who has multiple medical history mainly type 2 diabetes with neuropathy, s/p bilateral above knee amputation, stroke x3, coronary artery disease, left carotid artery stenosis s/p stent, peripheral artery disease s/p stents and frequent fall. She is here today to follow up type 2 diabetes. She has been diagnosed with type 2 diabetes for 10 years. Prior to that, she was prediabetes for many years.     Interval history:  Last visit with me was in 5/12/2017. Since the last visit, she was hospitalized for UTI sepsis in 10/2017. She will have suprapubic catheter placed next Monday.     She is now wheelchair bound but working with physical therapist for transferring her place to place. She is currently living at assisted living and the nurse prepared all medication and checked blood glucose for her.    For diabetes, A1C today is 5.1% (Hb of 10.9). Reviewed glucose log, , mostly 120-150. No hypoglycemia. She stated that she has not had good appetite. She mainly eats grill cheese sandwich.     DM complications:  Retinopathy: right eye - glaucoma, left eye partial blindness   Nephropathy: Cr 0.5 (10/2017), microalbumin normal (2/2016)  Neuropathy: numbness and tingling bilateral feet and hands - on gabapentin  On ASA and plavix  On atorvastatin 40 mg, LDL 77 (6/6/16)    She also has osteoporosis. DXA in 11/2012 showed T-score of -2.6 (BMD 0.684) at left femoral neck. DXA in 7/2015 showed lumbar spine (L1-L4) T-score: -2.4 Degenerative and/or osteosclerotic changes are present, falsely improving result and left femoral neck T-score was -1.9, right femoral neck T-score was -2.2. She has been on alendronate 70 mg week from 2012 to mid 2017. She takes 2000 IU of vitamin D3. She has cheese regularly.     Past Medical History  Past Medical History:   Diagnosis Date      Anemia      Antiplatelet or antithrombotic long-term use      Arthritis      AS (sickle cell trait) (H) 10/8/2013     Blind left eye      BPPV (benign paroxysmal positional vertigo)      Chronic back pain      COPD (chronic obstructive pulmonary disease) (H)      Coronary artery disease      CVA (cerebral infarction) 10/8/2012    x3, residual left sided weakness     Diastolic CHF (H) 9/4/2012     Dilantin toxicity 5/31/2013     Esophageal candidiasis (H)      GERD (gastroesophageal reflux disease)      Heart attack      Hernia, abdominal      History of blood transfusion     x5     History of thrombophlebitis      HTN (hypertension) 9/4/2012     Hyperlipidemia LDL goal <100 9/4/2012     Legally blind in right eye, as defined in [place]     No periphal vision right eye     Osteoporosis 1/21/2013     Other chronic pain      PAD (peripheral artery disease) (H)      Palpitations      Person who has had sex change operation 1/20/2014     Pneumonia      Schizoaffective disorder (H)      Seizure disorder (H) 9/4/2012     Sleep apnea     CPAP     Thrombosis of leg      Type 2 diabetes, HbA1C goal < 8% (H) 9/4/2012     Uncomplicated asthma      Unspecified cerebral artery occlusion with cerebral infarction    - hx of UGI bleeding secondary to severe esophagitis and gastric ulcer  - Right common femoral artery, SFA, and popliteal artery balloon athrectomy and angioplasty and right SFA stent placement 9/14/15.  - bilateral above knee amputation    Allergies  Allergies   Allergen Reactions     Bee Venom      Penicillins Anaphylaxis     Other reaction(s): Skin Rash and/or Hives     Dilantin [Phenytoin] Other (See Comments)     Generic dilantin only per pt     Iodide Hives     09/11/15: Pt states she can use iodine and doesn't have any problems      Iodine-131      Novocaine [Procaine] Hives     Other reaction(s): Skin Rash and/or Hives     Tositumomab      Medications  Current Outpatient Prescriptions   Medication Sig Dispense  Refill     ciprofloxacin (CIPRO) 500 MG tablet Take 1 tablet (500 mg) by mouth 2 times daily for 10 days 20 tablet 0     brimonidine (ALPHAGAN) 0.2 % ophthalmic solution Place 1 drop into the right eye 3 times daily (Patient taking differently: Place 1 drop into the right eye 2 times daily ) 15 mL 11     dorzolamide-timolol (COSOPT) 2-0.5 % ophthalmic solution Place 1 drop into the right eye 2 times daily 1 Bottle 11     sucralfate (CARAFATE) 1 GM tablet Take 1 tablet (1 g) by mouth 4 times daily May dissolve in 10 mL water is necessary. (Start upon completion of carafate suspension) 120 tablet 11     escitalopram (LEXAPRO) 5 MG tablet Take 2 tablets (10 mg) by mouth daily 60 tablet 5     albuterol (PROAIR HFA/PROVENTIL HFA/VENTOLIN HFA) 108 (90 BASE) MCG/ACT Inhaler Inhale 2 puffs into the lungs every 6 hours as needed for shortness of breath / dyspnea or wheezing 3 Inhaler 1     umeclidinium (INCRUSE ELLIPTA) 62.5 MCG/INH oral inhaler Inhale 1 puff into the lungs daily       ONDANSETRON PO Take 4 mg by mouth 3 times daily       cyclobenzaprine (FLEXERIL) 10 MG tablet Take 1 tablet (10 mg) by mouth 2 times daily as needed for muscle spasms 30 tablet 1     lidocaine (LIDODERM) 5 % Patch APPLY 1 TO 3 PATCHES TO PAINFUL AREA AT ONCE FOR UP TO 12 HOURS WITHIN A 24 HOUR PERIOD REMOVE AFTER 12 HOURS 30 patch 5     blood glucose monitoring (ONE TOUCH ULTRASOFT) lancets Use to test blood sugar 3 times daily or as directed. 100 each PRN     blood glucose monitoring (ONE TOUCH ULTRA) test strip Use to test blood sugars 3 times daily or as directed. 3 Box 3     metoprolol (TOPROL-XL) 25 MG 24 hr tablet TAKE 1 TABLET (25 MG) BY MOUTH DAILY 30 tablet 10     traZODone (DESYREL) 100 MG tablet Take 1.5 tablets (150 mg) by mouth At Bedtime 90 tablet 3     order for DME Full electric hospital bed with half rails    Dx: C38248, I110, J449  Length of need: lifetime 1 Device 0     order for DME Wheel Chair Cushion: 18 x 18 inch Amie  cushion 1 Device 0     order for DME Hospital bed with side rails 1 Device 0     polyethylene glycol (MIRALAX/GLYCOLAX) Packet Take 17 g by mouth daily as needed Dissolved in water or juice        ACETAMINOPHEN PO Take 1,000 mg by mouth every 6 hours as needed for fever Taking q4-6 hours, per MAR       pregabalin (LYRICA) 75 MG capsule Take 2 capsules (150 mg) by mouth 2 times daily 120 capsule 5     cetirizine (ZYRTEC) 10 MG tablet Take 1 tablet (10 mg) by mouth daily as needed for allergies 90 tablet 3     diclofenac (VOLTAREN) 1 % GEL topical gel Apply 2 g topically 4 times daily to hands 100 g 11     EPINEPHrine (EPIPEN JR) 0.15 MG/0.3ML injection Inject 0.3 mLs (0.15 mg) into the muscle as needed for anaphylaxis 0.6 mL 1     lisinopril (PRINIVIL/ZESTRIL) 10 MG tablet Take 1 tablet (10 mg) by mouth daily 90 tablet 3     pantoprazole (PROTONIX) 40 MG EC tablet Take 40 mg by mouth daily       risperiDONE (RISPERDAL) 0.5 MG tablet Take 0.5 mg by mouth At Bedtime       ferrous sulfate (IRON) 325 (65 FE) MG tablet Take 1 tablet (325 mg) by mouth 2 times daily With meals 60 tablet 2     order for DME Equipment being ordered: CPAP supplies mask, hose, filters, cushion    fax to [facility] at [phone] 10 Device prn     order for DME Equipment being ordered: CPAP supplies mask, hose, filters, cushion fax to [facility] at [phone]  Disp: 10 Device Refills: prn   Class: Local Print Start: 2/10/2017 1 Device 0     order for DME Equipment being ordered: Nebulizer and tubing supplies 1 Units 0     albuterol (2.5 MG/3ML) 0.083% neb solution INHALE 1 VIAL VIA NEBULIZER EVERY 6 HOURS AS NEEDED 360 mL 11     CYANOCOBALAMIN PO Take 2,000 mcg by mouth daily       Nutritional Supplements (RENÉE) PACK Take 1 packet by mouth 2 times daily       fluticasone (FLONASE) 50 MCG/ACT nasal spray Spray 1 spray into both nostrils daily       ADVAIR DISKUS 250-50 MCG/DOSE diskus inhaler Inhale 1 puff into the lungs 2 times daily         calcium citrate-vitamin D (CITRACAL) 315-250 MG-UNIT TABS Take 2 tablets by mouth daily 120 tablet 5     hydrochlorothiazide (MICROZIDE) 12.5 MG capsule Take 1 capsule (12.5 mg) by mouth daily (Patient taking differently: Take 12.5 mg by mouth daily Hold for sbp<90) 90 capsule 3     estradiol (ESTRACE) 1 MG tablet Take 1 tablet (1 mg) by mouth daily 90 tablet 3     levETIRAcetam (KEPPRA) 500 MG tablet Take 1 tablet (500 mg) by mouth 2 times daily 180 tablet 1     aspirin EC 81 MG EC tablet Take 1 tablet (81 mg) by mouth daily (Patient taking differently: Take 81 mg by mouth every morning ) 90 tablet 3     blood glucose monitoring (ONE TOUCH ULTRA 2) meter device kit Use to test blood sugars 3 times daily or as directed. 1 kit 0     phenytoin 200 MG CAPS Take 200 mg by mouth 2 times daily (Patient taking differently: Take 200 mg by mouth 2 times daily (takes at 8 AM and 8 PM)) 60 capsule 0     dorzolamide (TRUSOPT) 2 % ophthalmic solution Place 1 drop into both eyes 2 times daily        zinc 50 MG TABS Take 1 tablet by mouth daily       nitroglycerin (NITROSTAT) 0.4 MG SL tablet Place 1 tablet (0.4 mg) under the tongue every 5 minutes as needed for chest pain if you are still having symptoms after 3 doses (15 minutes) call 911. 25 tablet 1     MEDICATION GIVEN BY INTRATHECAL PUMP - INSTRUCTION continuous May 19, 2017 - per [facility] in [place] [phone]:  Conc: Bupivacaine 20 mg/mL and Fentanyl 2000 mcg/mL.  Continuous: Bupivacaine 7.265 mg/day and Fentanyl 726.5 mcg/day.  Boluses: Up to 7 boluses per 24-hr period of Bupivicaine 0.599 mg and Fentanyl 59.9 mcg    Pump Refill Date at [facility]: 7/2/17       latanoprost (XALATAN) 0.005 % ophthalmic solution Place 1 drop into both eyes At Bedtime 2.5 mL 11     atorvastatin (LIPITOR) 40 MG tablet Take 1 tablet (40 mg) by mouth daily (Patient taking differently: Take 40 mg by mouth At Bedtime ) 90 tablet 3     order for DME  Equipment being ordered: Glucerna daily shakes with each meal 1 Box 11     prochlorperazine (COMPAZINE) 10 MG tablet Take 1 tablet (10 mg) by mouth every 8 hours as needed 20 tablet 0     ORDER FOR DME Equipment being ordered: Power Wheelchair 1 Device 0     ORDER FOR DME Equipment being ordered: Depends briefs 1 Month 11     Cholecalciferol (VITAMIN D) 2000 UNITS tablet Take 2,000 Units by mouth daily. 100 tablet 3     Multiple Vitamin (MULTIVITAMIN OR) Take 1 tablet by mouth daily        Family History reviwed  Strong family history of type 2 diabetes in both parents, grandparents, cousin and sister  Stroke and heart disease in her parents    Social History - reviewed  Social History   Substance Use Topics     Smoking status: Current Every Day Smoker     Packs/day: 2.50     Years: 30.00     Types: Cigarettes, Cigars     Last attempt to quit: 11/1/2000     Smokeless tobacco: Never Used      Comment: Smoking 3 cig per day with each meal.      Alcohol use No     Now on disability  Stay at assisted living facility with her , has home care nurse visit once a week    ROS  Constitutional: stable weight, +fatigue  Eyes: partial blindness right and left eyes, poor eyes sight, could not see well particularly at night.  Cardiovascular: no chest pain, palpitations  Respiratory: no dyspnea, cough, shortness of breath  GI: no nausea, vomiting, intermittent diarrhea, no constipation, no abdominal pain   : no change in urine, no dysuria or hematuria  Musculoskeletal: tingling and numbness in her feet and hands  Integumentary: no concerning lesions   Neuro: +numbness in both feet and hands, no tremor, no dizziness  Endo: +sensitive to both hot and cold weather   Heme/Lymph: +UGI bleeding from severe esophagitis and gastritis 9/17/16  Allergy: no environmental allergies   Psych: could not sleep well due to feet pain, and back pain    Physical Exam  /84  Pulse 76  There is no height or weight on file to calculate  BMI.  Constitutional: no distress, comfortable, pleasant   Eyes: anicteric  Musculoskeletal: no edema, bilateral AKA amputation  Skin: no jaundice   Neurological: no tremor, in the motorized wheelchair due to leg and back pain  Psychological: appropriate mood       RESULTS  A1C 5.1 11/10/17  A1C 4.8 5/12/17  A1C 5.0 9/30/16  A1C      4.9   6/6/2016  A1C 5.2 2/5/2016  A1C  4.8  8/11/2015  A1C  5.1 6/30/15  A1C      5.3  1/20/15  A1C      4.8  10/21/14  A1C      5.2   4/1/2014  A1C      5.4   11/9/2013  A1C      5.2   10/29/2013  A1C      4.7   5/17/2013  A1C      4.9   3/13/2013    ENDO DIABETES Latest Ref Rng 2/5/2016   MICROALBUMIN URINE  6   MICROALBUMIN MG/G CR 0 - 25 mg/g Cr 10.72     ENDO DIABETES Latest Ref Rng 6/6/2016   CHOLESTEROL <200 mg/dL 155   LDL CHOLESTEROL, CALCULATED <100 mg/dL 77   HDL CHOLESTEROL >49 mg/dL 65   VLDL-CHOLESTEROL 0 - 30 mg/dL    NON HDL CHOLESTEROL <130 mg/dL 89   TRIGLYCERIDES <150 mg/dL 62     DXA 7/3/15  Lumbar Spine (L1-L4) T-score: -2.4 Degenerative and/or osteosclerotic changes are present, falsely improving result.   Left Femoral Neck T-score: -1.9  Right Femoral Neck T-score: -2.2    Lumbar (L1-L4) BMD: 0.900 Previous: 0.948   Total Hip Mean BMD: 0.739 Previous: 0.684    Patient has had a previous DXA performed on a different Lunar machine on 11/12. Unfortunately, method for data collection at that time is not adequate for comparison.    IMPRESSION  Osteoporosis (based on T-score and degenerative changes)  Degenerative changes of the spine    ASSESSMENT:    Sonya Foote is a 67 years old female who has multiple medical history mainly type 2 diabetes, strokex3, coronary artery disease, left carotid artery stenosis s/p stent, peripheral artery disease s/p stent and bilateral AKA amputation and frequent fall. She is here today to follow up type 2 diabetes.    1) type 2 diabetes with peripheral neuropathy and macrovascular complications (PVD, CAD, CVA):  A1C today is 5.1% but Hb  is 10.9 so A1C is falsely low.   - given higher BGs, I will add metformin  mg with dinner.    2) osteoporosis: diagnosed based on DXA in 2012, T-score of -2.6 at left femoral neck. She has been on alendronate 70 mg weekly from 2012 to mid 2017. Repeated DXA 7/2015 showed improving of hip BMD.   - agree to hold alendronate  - re-evaluate with DXA this year.  - continue with calcium and vitamin D intake.    3) hx of stroke, CAD, peripheral artery disease s/p stent and left AKA    4) hx of upper GI bleeding due to esophagitis and gastritis    5) recent UTI sepsis      PLAN:   - start low dose metfomrin  mg daily  - continue with diet modification   - advise on cutting down high carbohydrate diet   - continue to check blood glucose 1-2 times per day  - recommend for DXA this year  - RTC in 6 months    Michelle Irizarry MD  Endocrinology  [phone]    CC: Dr.Mark Casey

## 2017-11-10 NOTE — PATIENT INSTRUCTIONS
- you can have 1 egg per day  - take metformin  mg daily with dinner  - please do bone density at [facility]  - please do lab for cholesterol next year    - see me in 6 months    If you have any questions, please do not hesitate to call [phone] and ask for Endocrinology clinic.      After clinic hours, please contact [phone] and ask for Endocrinologist-on call    Sincerely,    Michelle Irizarry MD  Endocrinology

## 2017-11-10 NOTE — LETTER
11/10/2017       RE: Sonya Foote  [address]  [place] [address]     Dear Colleague,    Thank you for referring your patient, Sonya Foote, to the [facility] ENDOCRINOLOGY at [facility]. Please see a copy of my visit note below.         Endocrinology Note         Sonya is a 67 year old transgender female presents today for type 2 diabetes    HPI  Sonya Foote is a 67 years old transgender female who has multiple medical history mainly type 2 diabetes with neuropathy, s/p bilateral above knee amputation, stroke x3, coronary artery disease, left carotid artery stenosis s/p stent, peripheral artery disease s/p stents and frequent fall. She is here today to follow up type 2 diabetes. She has been diagnosed with type 2 diabetes for 10 years. Prior to that, she was prediabetes for many years.     Interval history:  Last visit with me was in 5/12/2017. Since the last visit, she was hospitalized for UTI sepsis in 10/2017. She will have suprapubic catheter placed next Monday.     She is now wheelchair bound but working with physical therapist for transferring her place to place. She is currently living at assisted living and the nurse prepared all medication and checked blood glucose for her.    For diabetes, A1C today is 5.1% (Hb of 10.9). Reviewed glucose log, , mostly 120-150. No hypoglycemia. She stated that she has not had good appetite. She mainly eats grill cheese sandwich.     DM complications:  Retinopathy: right eye - glaucoma, left eye partial blindness   Nephropathy: Cr 0.5 (10/2017), microalbumin normal (2/2016)  Neuropathy: numbness and tingling bilateral feet and hands - on gabapentin  On ASA and plavix  On atorvastatin 40 mg, LDL 77 (6/6/16)    She also has osteoporosis. DXA in 11/2012 showed T-score of -2.6 (BMD 0.684) at left femoral neck. DXA in 7/2015 showed lumbar spine (L1-L4) T-score: -2.4 Degenerative and/or osteosclerotic changes are  present, falsely improving result and left femoral neck T-score was -1.9, right femoral neck T-score was -2.2. She has been on alendronate 70 mg week from 2012 to mid 2017. She takes 2000 IU of vitamin D3. She has cheese regularly.     Past Medical History  Past Medical History:   Diagnosis Date     Anemia      Antiplatelet or antithrombotic long-term use      Arthritis      AS (sickle cell trait) (H) 10/8/2013     Blind left eye      BPPV (benign paroxysmal positional vertigo)      Chronic back pain      COPD (chronic obstructive pulmonary disease) (H)      Coronary artery disease      CVA (cerebral infarction) 10/8/2012    x3, residual left sided weakness     Diastolic CHF (H) 9/4/2012     Dilantin toxicity 5/31/2013     Esophageal candidiasis (H)      GERD (gastroesophageal reflux disease)      Heart attack      Hernia, abdominal      History of blood transfusion     x5     History of thrombophlebitis      HTN (hypertension) 9/4/2012     Hyperlipidemia LDL goal <100 9/4/2012     Legally blind in right eye, as defined in [place]     No periphal vision right eye     Osteoporosis 1/21/2013     Other chronic pain      PAD (peripheral artery disease) (H)      Palpitations      Person who has had sex change operation 1/20/2014     Pneumonia      Schizoaffective disorder (H)      Seizure disorder (H) 9/4/2012     Sleep apnea     CPAP     Thrombosis of leg      Type 2 diabetes, HbA1C goal < 8% (H) 9/4/2012     Uncomplicated asthma      Unspecified cerebral artery occlusion with cerebral infarction    - hx of UGI bleeding secondary to severe esophagitis and gastric ulcer  - Right common femoral artery, SFA, and popliteal artery balloon athrectomy and angioplasty and right SFA stent placement 9/14/15.  - bilateral above knee amputation    Allergies  Allergies   Allergen Reactions     Bee Venom      Penicillins Anaphylaxis     Other reaction(s): Skin Rash and/or Hives     Dilantin [Phenytoin] Other (See Comments)     Generic  dilantin only per pt     Iodide Hives     09/11/15: Pt states she can use iodine and doesn't have any problems      Iodine-131      Novocaine [Procaine] Hives     Other reaction(s): Skin Rash and/or Hives     Tositumomab      Medications  Current Outpatient Prescriptions   Medication Sig Dispense Refill     ciprofloxacin (CIPRO) 500 MG tablet Take 1 tablet (500 mg) by mouth 2 times daily for 10 days 20 tablet 0     brimonidine (ALPHAGAN) 0.2 % ophthalmic solution Place 1 drop into the right eye 3 times daily (Patient taking differently: Place 1 drop into the right eye 2 times daily ) 15 mL 11     dorzolamide-timolol (COSOPT) 2-0.5 % ophthalmic solution Place 1 drop into the right eye 2 times daily 1 Bottle 11     sucralfate (CARAFATE) 1 GM tablet Take 1 tablet (1 g) by mouth 4 times daily May dissolve in 10 mL water is necessary. (Start upon completion of carafate suspension) 120 tablet 11     escitalopram (LEXAPRO) 5 MG tablet Take 2 tablets (10 mg) by mouth daily 60 tablet 5     albuterol (PROAIR HFA/PROVENTIL HFA/VENTOLIN HFA) 108 (90 BASE) MCG/ACT Inhaler Inhale 2 puffs into the lungs every 6 hours as needed for shortness of breath / dyspnea or wheezing 3 Inhaler 1     umeclidinium (INCRUSE ELLIPTA) 62.5 MCG/INH oral inhaler Inhale 1 puff into the lungs daily       ONDANSETRON PO Take 4 mg by mouth 3 times daily       cyclobenzaprine (FLEXERIL) 10 MG tablet Take 1 tablet (10 mg) by mouth 2 times daily as needed for muscle spasms 30 tablet 1     lidocaine (LIDODERM) 5 % Patch APPLY 1 TO 3 PATCHES TO PAINFUL AREA AT ONCE FOR UP TO 12 HOURS WITHIN A 24 HOUR PERIOD REMOVE AFTER 12 HOURS 30 patch 5     blood glucose monitoring (ONE TOUCH ULTRASOFT) lancets Use to test blood sugar 3 times daily or as directed. 100 each PRN     blood glucose monitoring (ONE TOUCH ULTRA) test strip Use to test blood sugars 3 times daily or as directed. 3 Box 3     metoprolol (TOPROL-XL) 25 MG 24 hr tablet TAKE 1 TABLET (25 MG) BY MOUTH  DAILY 30 tablet 10     traZODone (DESYREL) 100 MG tablet Take 1.5 tablets (150 mg) by mouth At Bedtime 90 tablet 3     order for DME Full electric hospital bed with half rails    Dx: H72426, I110, J449  Length of need: lifetime 1 Device 0     order for DME Wheel Chair Cushion: 18 x 18 inch Roho cushion 1 Device 0     order for DME Hospital bed with side rails 1 Device 0     polyethylene glycol (MIRALAX/GLYCOLAX) Packet Take 17 g by mouth daily as needed Dissolved in water or juice        ACETAMINOPHEN PO Take 1,000 mg by mouth every 6 hours as needed for fever Taking q4-6 hours, per MAR       pregabalin (LYRICA) 75 MG capsule Take 2 capsules (150 mg) by mouth 2 times daily 120 capsule 5     cetirizine (ZYRTEC) 10 MG tablet Take 1 tablet (10 mg) by mouth daily as needed for allergies 90 tablet 3     diclofenac (VOLTAREN) 1 % GEL topical gel Apply 2 g topically 4 times daily to hands 100 g 11     EPINEPHrine (EPIPEN JR) 0.15 MG/0.3ML injection Inject 0.3 mLs (0.15 mg) into the muscle as needed for anaphylaxis 0.6 mL 1     lisinopril (PRINIVIL/ZESTRIL) 10 MG tablet Take 1 tablet (10 mg) by mouth daily 90 tablet 3     pantoprazole (PROTONIX) 40 MG EC tablet Take 40 mg by mouth daily       risperiDONE (RISPERDAL) 0.5 MG tablet Take 0.5 mg by mouth At Bedtime       ferrous sulfate (IRON) 325 (65 FE) MG tablet Take 1 tablet (325 mg) by mouth 2 times daily With meals 60 tablet 2     order for DME Equipment being ordered: CPAP supplies mask, hose, filters, cushion    fax to [facility] at [phone] 10 Device prn     order for DME Equipment being ordered: CPAP supplies mask, hose, filters, cushion fax to [facility] at [phone]  Disp: 10 Device Refills: prn   Class: Local Print Start: 2/10/2017 1 Device 0     order for DME Equipment being ordered: Nebulizer and tubing supplies 1 Units 0     albuterol (2.5 MG/3ML) 0.083% neb solution INHALE 1 VIAL VIA NEBULIZER EVERY 6 HOURS AS NEEDED 360 mL 11     CYANOCOBALAMIN  PO Take 2,000 mcg by mouth daily       Nutritional Supplements (RENÉE) PACK Take 1 packet by mouth 2 times daily       fluticasone (FLONASE) 50 MCG/ACT nasal spray Spray 1 spray into both nostrils daily       ADVAIR DISKUS 250-50 MCG/DOSE diskus inhaler Inhale 1 puff into the lungs 2 times daily        calcium citrate-vitamin D (CITRACAL) 315-250 MG-UNIT TABS Take 2 tablets by mouth daily 120 tablet 5     hydrochlorothiazide (MICROZIDE) 12.5 MG capsule Take 1 capsule (12.5 mg) by mouth daily (Patient taking differently: Take 12.5 mg by mouth daily Hold for sbp<90) 90 capsule 3     estradiol (ESTRACE) 1 MG tablet Take 1 tablet (1 mg) by mouth daily 90 tablet 3     levETIRAcetam (KEPPRA) 500 MG tablet Take 1 tablet (500 mg) by mouth 2 times daily 180 tablet 1     aspirin EC 81 MG EC tablet Take 1 tablet (81 mg) by mouth daily (Patient taking differently: Take 81 mg by mouth every morning ) 90 tablet 3     blood glucose monitoring (ONE TOUCH ULTRA 2) meter device kit Use to test blood sugars 3 times daily or as directed. 1 kit 0     phenytoin 200 MG CAPS Take 200 mg by mouth 2 times daily (Patient taking differently: Take 200 mg by mouth 2 times daily (takes at 8 AM and 8 PM)) 60 capsule 0     dorzolamide (TRUSOPT) 2 % ophthalmic solution Place 1 drop into both eyes 2 times daily        zinc 50 MG TABS Take 1 tablet by mouth daily       nitroglycerin (NITROSTAT) 0.4 MG SL tablet Place 1 tablet (0.4 mg) under the tongue every 5 minutes as needed for chest pain if you are still having symptoms after 3 doses (15 minutes) call 911. 25 tablet 1     MEDICATION GIVEN BY INTRATHECAL PUMP - INSTRUCTION continuous May 19, 2017 - per [facility] in [place] [phone]:  Conc: Bupivacaine 20 mg/mL and Fentanyl 2000 mcg/mL.  Continuous: Bupivacaine 7.265 mg/day and Fentanyl 726.5 mcg/day.  Boluses: Up to 7 boluses per 24-hr period of Bupivicaine 0.599 mg and Fentanyl 59.9 mcg    Pump Refill Date at [facility]  Activations: 7/2/17       latanoprost (XALATAN) 0.005 % ophthalmic solution Place 1 drop into both eyes At Bedtime 2.5 mL 11     atorvastatin (LIPITOR) 40 MG tablet Take 1 tablet (40 mg) by mouth daily (Patient taking differently: Take 40 mg by mouth At Bedtime ) 90 tablet 3     order for DME Equipment being ordered: Glucerna daily shakes with each meal 1 Box 11     prochlorperazine (COMPAZINE) 10 MG tablet Take 1 tablet (10 mg) by mouth every 8 hours as needed 20 tablet 0     ORDER FOR DME Equipment being ordered: Power Wheelchair 1 Device 0     ORDER FOR DME Equipment being ordered: Depends briefs 1 Month 11     Cholecalciferol (VITAMIN D) 2000 UNITS tablet Take 2,000 Units by mouth daily. 100 tablet 3     Multiple Vitamin (MULTIVITAMIN OR) Take 1 tablet by mouth daily        Family History reviwed  Strong family history of type 2 diabetes in both parents, grandparents, cousin and sister  Stroke and heart disease in her parents    Social History - reviewed  Social History   Substance Use Topics     Smoking status: Current Every Day Smoker     Packs/day: 2.50     Years: 30.00     Types: Cigarettes, Cigars     Last attempt to quit: 11/1/2000     Smokeless tobacco: Never Used      Comment: Smoking 3 cig per day with each meal.      Alcohol use No     Now on disability  Stay at assisted living facility with her , has home care nurse visit once a week    ROS  Constitutional: stable weight, +fatigue  Eyes: partial blindness right and left eyes, poor eyes sight, could not see well particularly at night.  Cardiovascular: no chest pain, palpitations  Respiratory: no dyspnea, cough, shortness of breath  GI: no nausea, vomiting, intermittent diarrhea, no constipation, no abdominal pain   : no change in urine, no dysuria or hematuria  Musculoskeletal: tingling and numbness in her feet and hands  Integumentary: no concerning lesions   Neuro: +numbness in both feet and hands, no tremor, no dizziness  Endo: +sensitive  to both hot and cold weather   Heme/Lymph: +UGI bleeding from severe esophagitis and gastritis 9/17/16  Allergy: no environmental allergies   Psych: could not sleep well due to feet pain, and back pain    Physical Exam  /84  Pulse 76  There is no height or weight on file to calculate BMI.  Constitutional: no distress, comfortable, pleasant   Eyes: anicteric  Musculoskeletal: no edema, bilateral AKA amputation  Skin: no jaundice   Neurological: no tremor, in the motorized wheelchair due to leg and back pain  Psychological: appropriate mood       RESULTS  A1C 5.1 11/10/17  A1C 4.8 5/12/17  A1C 5.0 9/30/16  A1C      4.9   6/6/2016  A1C 5.2 2/5/2016  A1C  4.8  8/11/2015  A1C  5.1 6/30/15  A1C      5.3  1/20/15  A1C      4.8  10/21/14  A1C      5.2   4/1/2014  A1C      5.4   11/9/2013  A1C      5.2   10/29/2013  A1C      4.7   5/17/2013  A1C      4.9   3/13/2013    ENDO DIABETES Latest Ref Rng 2/5/2016   MICROALBUMIN URINE  6   MICROALBUMIN MG/G CR 0 - 25 mg/g Cr 10.72     ENDO DIABETES Latest Ref Rng 6/6/2016   CHOLESTEROL <200 mg/dL 155   LDL CHOLESTEROL, CALCULATED <100 mg/dL 77   HDL CHOLESTEROL >49 mg/dL 65   VLDL-CHOLESTEROL 0 - 30 mg/dL    NON HDL CHOLESTEROL <130 mg/dL 89   TRIGLYCERIDES <150 mg/dL 62     DXA 7/3/15  Lumbar Spine (L1-L4) T-score: -2.4 Degenerative and/or osteosclerotic changes are present, falsely improving result.   Left Femoral Neck T-score: -1.9  Right Femoral Neck T-score: -2.2    Lumbar (L1-L4) BMD: 0.900 Previous: 0.948   Total Hip Mean BMD: 0.739 Previous: 0.684    Patient has had a previous DXA performed on a different [employer] machine on 11/12. Unfortunately, method for data collection at that time is not adequate for comparison.    IMPRESSION  Osteoporosis (based on T-score and degenerative changes)  Degenerative changes of the spine    ASSESSMENT:    Sonya Foote is a 67 years old female who has multiple medical history mainly type 2 diabetes, strokex3, coronary artery disease, left  carotid artery stenosis s/p stent, peripheral artery disease s/p stent and bilateral AKA amputation and frequent fall. She is here today to follow up type 2 diabetes.    1) type 2 diabetes with peripheral neuropathy and macrovascular complications (PVD, CAD, CVA):  A1C today is 5.1% but Hb is 10.9 so A1C is falsely low.   - given higher BGs, I will add metformin  mg with dinner.    2) osteoporosis: diagnosed based on DXA in 2012, T-score of -2.6 at left femoral neck. She has been on alendronate 70 mg weekly from 2012 to mid 2017. Repeated DXA 7/2015 showed improving of hip BMD.   - agree to hold alendronate  - re-evaluate with DXA this year.  - continue with calcium and vitamin D intake.    3) hx of stroke, CAD, peripheral artery disease s/p stent and left AKA    4) hx of upper GI bleeding due to esophagitis and gastritis    5) recent UTI sepsis      PLAN:   - start low dose metfomrin  mg daily  - continue with diet modification   - advise on cutting down high carbohydrate diet   - continue to check blood glucose 1-2 times per day  - recommend for DXA this year  - RTC in 6 months    Michelle Irizarry MD  Endocrinology  [phone]    CC: Dr.Mark Casey

## 2017-11-10 NOTE — MR AVS SNAPSHOT
After Visit Summary   11/10/2017    Sonya Foote    MRN: [id number]           Patient Information     Date Of Birth          1950        Visit Information        Provider Department      11/10/2017 9:20 AM Michelle Irizarry MD [facility] Endocrinology        Today's Diagnoses     Type 2 diabetes mellitus with diabetic peripheral angiopathy and gangrene, without long-term current use of insulin (H)    -  1    Osteoporosis, unspecified osteoporosis type, unspecified pathological fracture presence          Care Instructions    - you can have 1 egg per day  - take metformin  mg daily with dinner  - please do bone density at [facility]  - please do lab for cholesterol next year    If you have any questions, please do not hesitate to call [phone] and ask for Endocrinology clinic.      After clinic hours, please contact [phone] and ask for Endocrinologist-on call    Sincerely,    Michelle Irizarry MD  Endocrinology            Follow-ups after your visit        Your next 10 appointments already scheduled     Nov 10, 2017 10:00 AM CST   LAB with [facility] ([facility])    [address] [address] [phone]           Please do not eat 10-12 hours before your appointment if you are coming in fasting for labs on lipids, cholesterol, or glucose (sugar). This does not apply to pregnant women. Water, hot tea and black coffee (with nothing added) are okay. Do not drink other fluids, diet soda or chew gum.            Nov 13, 2017   Procedure with Elizabeth Oliver MD   [facility], Same Day Surgery (--)    [address] [facility] [id number] [phone]            Dec 06, 2017 10:00 AM CST   Evaluation with Manuela Mitchell, ULISES   [facility], Occupational Therapy, Vision - Outpatie ([facility] [place])    [address] [facility]  9th Floor, [facility]  [address]a  [facility] [address]   [phone]            Dec 14, 2017  1:45 PM CST   (Arrive by 1:30 PM)   Post-Op with Elizabeth Oliver MD   [facility] for Prostate and Urologic Cancers ([facility])    [address]  4th [facility] [address]   [phone]            Dec 29, 2017 10:00 AM CST   (Arrive by 9:45 AM)   New Patient Visit with VICTOR M Floyd CNP   [facility] Gastroenterology and IBD Clinic ([facility])    [address]  [address]th [facility] [address]   [phone]            Feb 19, 2018  9:30 AM CST   (Arrive by 9:15 AM)   Return Visit with Alina Jennings MD   [facility] for Lung Science and Health ([facility])    [address]  [address]rd [facility] [address]   [phone]            Mar 06, 2018  9:15 AM CST   VISUAL FIELD with [facility] EYE VISUAL FIELD   Eye Clinic ([facility])    [address]  [address]  9th Fl Clin 9a  [facility] [address]   [phone]            Mar 06, 2018  9:45 AM CST   RETURN GLAUCOMA with Marely Robin MD   Eye Clinic ([facility])    [address]  [address]  9th [address] Clin 9a  [facility] [address]   [phone]            May 11, 2018 10:50 AM CDT   (Arrive by 10:35 AM)   RETURN DIABETES with Michelle Irizarry MD   [facility] Endocrinology ([facility])    [address]  3rd Floor  [facility] [address]   [phone]              Future tests that were ordered for you today     Open Future Orders        Priority Expected Expires Ordered    Lipid Profile Routine 5/9/2018 11/10/2018 11/10/2017    Albumin Random Urine Quantitative with Creat Ratio Routine 5/9/2018 11/10/2018 11/10/2017    Hemoglobin A1c Routine 5/9/2018 11/10/2018 11/10/2017    Dexa hip/pelvis/spine Routine  6/8/2018 11/10/2017            Who to  contact     Please call your clinic at [phone] to:    Ask questions about your health    Make or cancel appointments    Discuss your medicines    Learn about your test results    Speak to your doctor   If you have compliments or concerns about an experience at your clinic, or if you wish to file a complaint, please contact [facility] Patient Relations at [phone] or email us at [email or web address]         Additional Information About Your Visit        Tag[employer]har[employer]injathat Information     [employer] is an electronic gateway that provides easy, online access to your medical records. With [employer], you can request a clinic appointment, read your test results, renew a prescription or communicate with your care team.     To sign up for [employer] visit the website at [email or web address]   You will be asked to enter the access code listed below, as well as some personal information. Please follow the directions to create your username and password.     Your access code is: [email or web address]  Expires: 2018 11:09 AM     Your access code will  in 90 days. If you need help or a new code, please contact your [facility] or call [phone] for assistance.        Care EveryWhere ID     This is your Care EveryWhere ID. This could be used by other organizations to access your [place] medical records  [id number]        Your Vitals Were     Pulse                   76            Blood Pressure from Last 3 Encounters:   11/10/17 144/84   17 97/66   10/23/17 140/80    Weight from Last 3 Encounters:   17 60.3 kg (133 lb)   10/23/17 61.2 kg (135 lb)   10/18/17 61.2 kg (135 lb)                 Today's Medication Changes          These changes are accurate as of: 11/10/17  9:30 AM.  If you have any questions, ask your nurse or doctor.               Start taking these medicines.        Dose/Directions    metFORMIN 500 MG 24 hr tablet   Commonly known  as:  GLUCOPHAGE-XR   Used for:  Type 2 diabetes mellitus with diabetic peripheral angiopathy and gangrene, without long-term current use of insulin (H)   Started by:  Michelle Irizarry MD        Dose:  500 mg   Take 1 tablet (500 mg) by mouth daily (with dinner)   Quantity:  90 tablet   Refills:  3         These medicines have changed or have updated prescriptions.        Dose/Directions    aspirin 81 MG EC tablet   This may have changed:  when to take this   Used for:  Unstable angina (H)        Dose:  81 mg   Take 1 tablet (81 mg) by mouth daily   Quantity:  90 tablet   Refills:  3       atorvastatin 40 MG tablet   Commonly known as:  LIPITOR   This may have changed:  when to take this   Used for:  Hyperlipidemia LDL goal <100        Dose:  40 mg   Take 1 tablet (40 mg) by mouth daily   Quantity:  90 tablet   Refills:  3       brimonidine 0.2 % ophthalmic solution   Commonly known as:  ALPHAGAN   This may have changed:  when to take this   Used for:  Primary open angle glaucoma of both eyes, severe stage        Dose:  1 drop   Place 1 drop into the right eye 3 times daily   Quantity:  15 mL   Refills:  11       hydrochlorothiazide 12.5 MG capsule   Commonly known as:  MICROZIDE   This may have changed:  additional instructions   Used for:  Essential hypertension with goal blood pressure less than 130/80        Dose:  12.5 mg   Take 1 capsule (12.5 mg) by mouth daily   Quantity:  90 capsule   Refills:  3       Phenytoin Sodium Extended 200 MG Caps   This may have changed:  additional instructions   Used for:  Seizure disorder (H)        Dose:  200 mg   Take 200 mg by mouth 2 times daily   Quantity:  60 capsule   Refills:  0            Where to get your medicines      These medications were sent to [facility] [place] [facility] [address] [place] [address]     Phone:  [phone]     metFORMIN 500 MG 24 hr tablet                Primary  Care Provider Office Phone # Fax #    Allan Casey [phone]       [address] [place] [address]        Equal Access to Services     ROLANDAIRAJ VELMA : Hadii shaheen ku kenyattao Sostephali, waaxda luqadaha, qaybta kaalmada adeetta, tonie chaorossana cara. So [facility] [phone].    ATENCIÓN: Si habla español, tiene a briones disposición servicios gratuitos de asistencia lingüística. Llame al [phone].    We comply with applicable federal civil rights laws and [place] laws. We do not discriminate on the basis of race, color, national origin, age, disability, sex, sexual orientation, or gender identity.            Thank you!     Thank you for choosing [facility]  for your care. Our goal is always to provide you with excellent care. Hearing back from our patients is one way we can continue to improve our services. Please take a few minutes to complete the written survey that you may receive in the mail after your visit with us. Thank you!             Your Updated Medication List - Protect others around you: Learn how to safely use, store and throw away your medicines at www.disposemymeds.org.          This list is accurate as of: 11/10/17  9:30 AM.  Always use your most recent med list.                   Brand Name Dispense Instructions for use Diagnosis    ACETAMINOPHEN PO      Take 1,000 mg by mouth every 6 hours as needed for fever Taking q4-6 hours, per MAR        ADVAIR DISKUS 250-50 MCG/DOSE diskus inhaler   Generic drug:  fluticasone-salmeterol      Inhale 1 puff into the lungs 2 times daily        * albuterol (2.5 MG/3ML) 0.083% neb solution     360 mL    INHALE 1 VIAL VIA NEBULIZER EVERY 6 HOURS AS NEEDED    Chronic obstructive pulmonary disease, unspecified COPD type (H)       * albuterol 108 (90 BASE) MCG/ACT Inhaler    PROAIR HFA/PROVENTIL HFA/VENTOLIN HFA    3 Inhaler    Inhale 2 puffs into the lungs every 6 hours as needed for shortness of breath /  dyspnea or wheezing    JOSE (obstructive sleep apnea)       aspirin 81 MG EC tablet     90 tablet    Take 1 tablet (81 mg) by mouth daily    Unstable angina (H)       atorvastatin 40 MG tablet    LIPITOR    90 tablet    Take 1 tablet (40 mg) by mouth daily    Hyperlipidemia LDL goal <100       blood glucose monitoring lancets     100 each    Use to test blood sugar 3 times daily or as directed.    Type 2 diabetes mellitus with diabetic peripheral angiopathy without gangrene, without long-term current use of insulin (H)       blood glucose monitoring meter device kit     1 kit    Use to test blood sugars 3 times daily or as directed.    Type 2 diabetes, HbA1C goal < 8% (H)       blood glucose monitoring test strip    ONETOUCH ULTRA    3 Box    Use to test blood sugars 3 times daily or as directed.    Type 2 diabetes mellitus with diabetic peripheral angiopathy without gangrene, without long-term current use of insulin (H)       brimonidine 0.2 % ophthalmic solution    ALPHAGAN    15 mL    Place 1 drop into the right eye 3 times daily    Primary open angle glaucoma of both eyes, severe stage       calcium citrate-vitamin D 315-250 MG-UNIT Tabs per tablet    CITRACAL    120 tablet    Take 2 tablets by mouth daily    Osteoporosis       cetirizine 10 MG tablet    zyrTEC    90 tablet    Take 1 tablet (10 mg) by mouth daily as needed for allergies    Seasonal allergic rhinitis, unspecified allergic rhinitis trigger       ciprofloxacin 500 MG tablet    CIPRO    20 tablet    Take 1 tablet (500 mg) by mouth 2 times daily for 10 days    Urinary tract infection       CYANOCOBALAMIN PO      Take 2,000 mcg by mouth daily        cyclobenzaprine 10 MG tablet    FLEXERIL    30 tablet    Take 1 tablet (10 mg) by mouth 2 times daily as needed for muscle spasms    Cervicalgia       diclofenac 1 % Gel topical gel    VOLTAREN    100 g    Apply 2 g topically 4 times daily to hands    Primary osteoarthritis of both hands       dorzolamide 2  % ophthalmic solution    TRUSOPT     Place 1 drop into both eyes 2 times daily        dorzolamide-timolol 2-0.5 % ophthalmic solution    COSOPT    1 Bottle    Place 1 drop into the right eye 2 times daily    Primary open angle glaucoma of both eyes, severe stage       EPINEPHrine 0.15 MG/0.3ML injection 2-pack    EPIPEN JR    0.6 mL    Inject 0.3 mLs (0.15 mg) into the muscle as needed for anaphylaxis    Bee sting reaction, undetermined intent, subsequent encounter       escitalopram 5 MG tablet    LEXAPRO    60 tablet    Take 2 tablets (10 mg) by mouth daily    Adjustment disorder with depressed mood       estradiol 1 MG tablet    ESTRACE    90 tablet    Take 1 tablet (1 mg) by mouth daily    Person who has had sex change operation       ferrous sulfate 325 (65 FE) MG tablet    IRON    60 tablet    Take 1 tablet (325 mg) by mouth 2 times daily With meals        fluticasone 50 MCG/ACT spray    FLONASE     Spray 1 spray into both nostrils daily        hydrochlorothiazide 12.5 MG capsule    MICROZIDE    90 capsule    Take 1 capsule (12.5 mg) by mouth daily    Essential hypertension with goal blood pressure less than 130/80       INCRUSE ELLIPTA 62.5 MCG/INH oral inhaler   Generic drug:  umeclidinium      Inhale 1 puff into the lungs daily        RENÉE Pack      Take 1 packet by mouth 2 times daily        latanoprost 0.005 % ophthalmic solution    XALATAN    2.5 mL    Place 1 drop into both eyes At Bedtime    Primary open angle glaucoma, stage unspecified       levETIRAcetam 500 MG tablet    KEPPRA    180 tablet    Take 1 tablet (500 mg) by mouth 2 times daily    Nausea       lidocaine 5 % Patch    LIDODERM    30 patch    APPLY 1 TO 3 PATCHES TO PAINFUL AREA AT ONCE FOR UP TO 12 HOURS WITHIN A 24 HOUR PERIOD REMOVE AFTER 12 HOURS    Chronic pain syndrome, 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 (H)       lisinopril 10 MG tablet    PRINIVIL/ZESTRIL    90 tablet    Take 1 tablet (10 mg) by mouth daily     Essential hypertension with goal blood pressure less than 130/80       MEDICATION GIVEN BY INTRATHECAL PUMP - INSTRUCTION      continuous May 19, 2017 - per [facility] [place] [phone]: Conc: Bupivacaine 20 mg/mL and Fentanyl 2000 mcg/mL. Continuous: Bupivacaine 7.265 mg/day and Fentanyl 726.5 mcg/day. Boluses: Up to 7 boluses per 24-hr period of Bupivicaine 0.599 mg and Fentanyl 59.9 mcg  Pump Refill Date at [facility]: 7/2/17        metFORMIN 500 MG 24 hr tablet    GLUCOPHAGE-XR    90 tablet    Take 1 tablet (500 mg) by mouth daily (with dinner)    Type 2 diabetes mellitus with diabetic peripheral angiopathy and gangrene, without long-term current use of insulin (H)       metoprolol 25 MG 24 hr tablet    TOPROL-XL    30 tablet    TAKE 1 TABLET (25 MG) BY MOUTH DAILY    Old myocardial infarction       MULTIVITAMIN PO      Take 1 tablet by mouth daily        nitroGLYcerin 0.4 MG sublingual tablet    NITROSTAT    25 tablet    Place 1 tablet (0.4 mg) under the tongue every 5 minutes as needed for chest pain if you are still having symptoms after 3 doses (15 minutes) call 911.    Chronic systolic congestive heart failure (H), Old myocardial infarction       ONDANSETRON PO      Take 4 mg by mouth 3 times daily        * order for DME     1 Month    Equipment being ordered: Depends briefs    Incontinence       * order for DME     1 Device    Equipment being ordered: Power Wheelchair    CVA (cerebral infarction), HTN (hypertension)       * order for DME     1 Box    Equipment being ordered: Glucerna daily shakes with each meal    Type 2 diabetes mellitus with other diabetic neurological complication       * order for DME     1 Units    Equipment being ordered: Nebulizer and tubing supplies    Simple chronic bronchitis (H)       * order for DME     1 Device    Equipment being ordered: CPAP supplies mask, hose, filters, cushion fax to [facility] at [phone] Disp: 10  DeviceRefills: prn Class: Local PrintStart: 2/10/2017    Chronic obstructive pulmonary disease, unspecified COPD type (H)       * order for DME     10 Device    Equipment being ordered: CPAP supplies mask, hose, filters, cushion  fax to [facility] at [phone]    COPD (chronic obstructive pulmonary disease) (H)       * order for DME     1 Device    Hospital bed with side rails    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 (H)       * order for DME     1 Device    Full electric hospital bed with half rails  Dx: Y39551, I110, J449 Length of need: lifetime    Status post bilateral above knee amputation (H)       * order for DME     1 Device    Wheel Chair Cushion: 18 x 18 inch Roho cushion    Status post bilateral above knee amputation (H)       pantoprazole 40 MG EC tablet    PROTONIX     Take 40 mg by mouth daily        Phenytoin Sodium Extended 200 MG Caps     60 capsule    Take 200 mg by mouth 2 times daily    Seizure disorder (H)       polyethylene glycol Packet    MIRALAX/GLYCOLAX     Take 17 g by mouth daily as needed Dissolved in water or juice        pregabalin 75 MG capsule    LYRICA    120 capsule    Take 2 capsules (150 mg) by mouth 2 times daily    Pain in both upper extremities       prochlorperazine 10 MG tablet    COMPAZINE    20 tablet    Take 1 tablet (10 mg) by mouth every 8 hours as needed    Nausea with vomiting       risperiDONE 0.5 MG tablet    risperDAL     Take 0.5 mg by mouth At Bedtime        sucralfate 1 GM tablet    CARAFATE    120 tablet    Take 1 tablet (1 g) by mouth 4 times daily May dissolve in 10 mL water is necessary. (Start upon completion of carafate suspension)    Adjustment disorder with depressed mood       traZODone 100 MG tablet    DESYREL    90 tablet    Take 1.5 tablets (150 mg) by mouth At Bedtime    Anxiety state       vitamin D 2000 UNITS tablet     100 tablet    Take 2,000 Units by mouth daily.        zinc 50 MG Tabs      Take 1 tablet by mouth  daily        * Notice:  This list has 11 medication(s) that are the same as other medications prescribed for you. Read the directions carefully, and ask your doctor or other care provider to review them with you.

## 2017-11-13 ENCOUNTER — ANESTHESIA (OUTPATIENT)
Dept: SURGERY | Facility: CLINIC | Age: 67
End: 2017-11-13

## 2017-11-14 DIAGNOSIS — G40.909 SEIZURE DISORDER (H): ICD-10-CM

## 2017-11-14 LAB — PHENYTOIN SERPL-MCNC: 7.6 MG/L (ref 10–20)

## 2017-11-14 PROCEDURE — 36415 COLL VENOUS BLD VENIPUNCTURE: CPT | Performed by: INTERNAL MEDICINE

## 2017-11-14 PROCEDURE — 80185 ASSAY OF PHENYTOIN TOTAL: CPT | Performed by: INTERNAL MEDICINE

## 2017-11-14 PROCEDURE — 99000 SPECIMEN HANDLING OFFICE-LAB: CPT | Performed by: INTERNAL MEDICINE

## 2017-11-14 PROCEDURE — 80186 ASSAY OF PHENYTOIN FREE: CPT | Mod: 90 | Performed by: INTERNAL MEDICINE

## 2017-11-15 LAB — PHENYTOIN FREE SERPL-MCNC: 0.64 UG/ML

## 2017-11-16 NOTE — PROGRESS NOTES
11/13/17:   CM received call from member stating that she received call from surgeon s office and surgery may need to be canceled for Monday as [employer] will not cover her to stay overnight in hospital since it is an outpatient procedure.   Member states that they state member needs someone to be with her for 24 hours post surgery in order for them to do surgery and send her home. She states she does not have anyone and is upset b/c she does not want surgery to be canceled.   She states she has been waiting for 1 year for this surgery.   CM asked member about son or daughter.  She states daughter will not and son is on a cruise.   Asked member if surgery were to be rescheduled, would her son agree to stay with her for 24 hours - she states she will not ask him this as he has to work and is busy - discussed with member that her son would most likely want to do this for her and encouraged member to discuss with son.  She declines to ask son.  She states she shared with surgeon office that she lives in [place] and no one to take care of her for 24 hours.  She states that they can check on her throughout night and has a call button but states that they want someone physically with her for 24 hours.  Member states she is waiting for a call back from surgeon office.   She is unsure why this is coming up now so close to surgery as she was told she would stay over due to her dx/comorbidities.     CM received call back from member stating that member can stay in hospital overnight but bill would be her responsibility  - member states she cannot afford this.  She told office that she was going to call CM to discuss. Discussed TCU stay.   As CM talking with member, received phone call from Ginger at  - discussed member s situation.  Ginger states best plan is to cancel surgery for Monday and discuss options and reschedule.  Discussed TCU  option with Ginger and member s insurance will cover.  Discussed TCU at  as an option.  Ginger states will look into this but best plan is to cx surgery at this time to allow more time to plan.   She will contact member.     Member called CM  - she received call from Ginger cx surgery - member is very disappointed.  Reminded her that she has her son to ask to stay with her - she states she can t ask him to stay that long - reminded her that it is 24 hours/1 day.   She understood but stated she would not ask him.  She prefers TCU.  She will wait for call back from surgeon office to figure out plan and reschedule surgery.     Jamie Sharma RN, PHN  [facility]

## 2017-11-16 NOTE — PROGRESS NOTES
11/16/17: ELVIRA spoke with member - she states she has not heard from surgeon office yet at this time.  She states that she plans to call this week to f/u on rescheduling surgery and plan for after care.     Member also states that power chair is being repaired - she has loaner chair currently.     Jamie Sharma RN, N  [facility]

## 2017-11-20 ENCOUNTER — TELEPHONE (OUTPATIENT)
Dept: ENDOCRINOLOGY | Facility: CLINIC | Age: 67
End: 2017-11-20

## 2017-11-20 NOTE — TELEPHONE ENCOUNTER
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   ----- Message -----     From: Danae Olmos RN     Sent: 11/20/2017   8:39 AM       To: [facility] Triage-  Subject: Dr Martinez pt- interchange Rx                      Fax received from [employer] insurance requesting to interchange the order for Metformin  mg Tab(long acting) and change to Metformin 500 mg tabs (reg).  #LOWER PRICE TO PATIENT.    If any questions, call the [facility] at  # [phone].    Thank you,  Danae BROOKE  Med Refill Team

## 2017-11-20 NOTE — TELEPHONE ENCOUNTER
I called [employer] to le them  know that Sonya needs the Metformin ER for GI issues. She cannot tolerate the  the Metformin alone ..

## 2017-11-26 ENCOUNTER — ANESTHESIA EVENT (OUTPATIENT)
Dept: SURGERY | Facility: CLINIC | Age: 67
End: 2017-11-26
Payer: COMMERCIAL

## 2017-11-26 ENCOUNTER — SURGERY (OUTPATIENT)
Age: 67
End: 2017-11-26

## 2017-11-26 ENCOUNTER — ANESTHESIA (OUTPATIENT)
Dept: SURGERY | Facility: CLINIC | Age: 67
End: 2017-11-26
Payer: COMMERCIAL

## 2017-11-26 ENCOUNTER — HOSPITAL ENCOUNTER (OUTPATIENT)
Facility: CLINIC | Age: 67
Setting detail: OBSERVATION
Discharge: HOME | End: 2017-11-27
Attending: EMERGENCY MEDICINE | Admitting: EMERGENCY MEDICINE
Payer: COMMERCIAL

## 2017-11-26 DIAGNOSIS — K22.2 ESOPHAGEAL OBSTRUCTION: Primary | ICD-10-CM

## 2017-11-26 DIAGNOSIS — K21.9 GASTROESOPHAGEAL REFLUX DISEASE WITHOUT ESOPHAGITIS: Primary | ICD-10-CM

## 2017-11-26 DIAGNOSIS — T18.108A FOREIGN BODY IN ESOPHAGUS, INITIAL ENCOUNTER: ICD-10-CM

## 2017-11-26 PROBLEM — T18.128A FOOD IMPACTION OF ESOPHAGUS: Status: ACTIVE | Noted: 2017-11-26
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
## 2017-11-26 LAB
ANION GAP SERPL CALCULATED.3IONS-SCNC: 7 MMOL/L (ref 3–14)
BASOPHILS # BLD AUTO: 0 10E9/L (ref 0–0.2)
BASOPHILS NFR BLD AUTO: 0.4 %
BUN SERPL-MCNC: 9 MG/DL (ref 7–30)
CALCIUM SERPL-MCNC: 9.2 MG/DL (ref 8.5–10.1)
CHLORIDE SERPL-SCNC: 108 MMOL/L (ref 94–109)
CO2 SERPL-SCNC: 28 MMOL/L (ref 20–32)
CREAT SERPL-MCNC: 0.59 MG/DL (ref 0.52–1.04)
DIFFERENTIAL METHOD BLD: ABNORMAL
EOSINOPHIL # BLD AUTO: 0.2 10E9/L (ref 0–0.7)
EOSINOPHIL NFR BLD AUTO: 1.5 %
ERYTHROCYTE [DISTWIDTH] IN BLOOD BY AUTOMATED COUNT: 16.2 % (ref 10–15)
GFR SERPL CREATININE-BSD FRML MDRD: >90 ML/MIN/1.7M2
GLUCOSE BLDC GLUCOMTR-MCNC: 139 MG/DL (ref 70–99)
GLUCOSE BLDC GLUCOMTR-MCNC: 165 MG/DL (ref 70–99)
GLUCOSE BLDC GLUCOMTR-MCNC: 70 MG/DL (ref 70–99)
GLUCOSE SERPL-MCNC: 76 MG/DL (ref 70–99)
HCT VFR BLD AUTO: 36.5 % (ref 35–47)
HGB BLD-MCNC: 11.5 G/DL (ref 11.7–15.7)
IMM GRANULOCYTES # BLD: 0 10E9/L (ref 0–0.4)
IMM GRANULOCYTES NFR BLD: 0.2 %
LYMPHOCYTES # BLD AUTO: 2 10E9/L (ref 0.8–5.3)
LYMPHOCYTES NFR BLD AUTO: 19.6 %
MCH RBC QN AUTO: 26.9 PG (ref 26.5–33)
MCHC RBC AUTO-ENTMCNC: 31.5 G/DL (ref 31.5–36.5)
MCV RBC AUTO: 85 FL (ref 78–100)
MONOCYTES # BLD AUTO: 0.9 10E9/L (ref 0–1.3)
MONOCYTES NFR BLD AUTO: 9.4 %
NEUTROPHILS # BLD AUTO: 6.9 10E9/L (ref 1.6–8.3)
NEUTROPHILS NFR BLD AUTO: 68.9 %
NRBC # BLD AUTO: 0 10*3/UL
NRBC BLD AUTO-RTO: 0 /100
PLATELET # BLD AUTO: 288 10E9/L (ref 150–450)
POTASSIUM SERPL-SCNC: 3.8 MMOL/L (ref 3.4–5.3)
RBC # BLD AUTO: 4.28 10E12/L (ref 3.8–5.2)
SODIUM SERPL-SCNC: 143 MMOL/L (ref 133–144)
UPPER GI ENDOSCOPY: NORMAL
WBC # BLD AUTO: 10 10E9/L (ref 4–11)

## 2017-11-26 PROCEDURE — 71000014 ZZH RECOVERY PHASE 1 LEVEL 2 FIRST HR: Performed by: INTERNAL MEDICINE

## 2017-11-26 PROCEDURE — 25000128 H RX IP 250 OP 636: Performed by: ANESTHESIOLOGY

## 2017-11-26 PROCEDURE — 25000125 ZZHC RX 250: Performed by: ANESTHESIOLOGY

## 2017-11-26 PROCEDURE — 85025 COMPLETE CBC W/AUTO DIFF WBC: CPT | Performed by: EMERGENCY MEDICINE

## 2017-11-26 PROCEDURE — 25000566 ZZH SEVOFLURANE, EA 15 MIN: Performed by: INTERNAL MEDICINE

## 2017-11-26 PROCEDURE — 37000008 ZZH ANESTHESIA TECHNICAL FEE, 1ST 30 MIN: Performed by: INTERNAL MEDICINE

## 2017-11-26 PROCEDURE — 99285 EMERGENCY DEPT VISIT HI MDM: CPT | Mod: 25 | Performed by: EMERGENCY MEDICINE

## 2017-11-26 PROCEDURE — 40000170 ZZH STATISTIC PRE-PROCEDURE ASSESSMENT II: Performed by: INTERNAL MEDICINE

## 2017-11-26 PROCEDURE — 25000128 H RX IP 250 OP 636: Performed by: EMERGENCY MEDICINE

## 2017-11-26 PROCEDURE — G0378 HOSPITAL OBSERVATION PER HR: HCPCS

## 2017-11-26 PROCEDURE — 27210794 ZZH OR GENERAL SUPPLY STERILE: Performed by: INTERNAL MEDICINE

## 2017-11-26 PROCEDURE — 99219 ZZC INITIAL OBSERVATION CARE,LEVL II: CPT | Mod: Z6 | Performed by: NURSE PRACTITIONER

## 2017-11-26 PROCEDURE — 96374 THER/PROPH/DIAG INJ IV PUSH: CPT | Performed by: EMERGENCY MEDICINE

## 2017-11-26 PROCEDURE — 43247 EGD REMOVE FOREIGN BODY: CPT | Performed by: INTERNAL MEDICINE

## 2017-11-26 PROCEDURE — 36000051 ZZH SURGERY LEVEL 2 1ST 30 MIN - UMMC: Performed by: INTERNAL MEDICINE

## 2017-11-26 PROCEDURE — 37000009 ZZH ANESTHESIA TECHNICAL FEE, EACH ADDTL 15 MIN: Performed by: INTERNAL MEDICINE

## 2017-11-26 PROCEDURE — 25000128 H RX IP 250 OP 636: Performed by: INTERNAL MEDICINE

## 2017-11-26 PROCEDURE — C9399 UNCLASSIFIED DRUGS OR BIOLOG: HCPCS | Performed by: STUDENT IN AN ORGANIZED HEALTH CARE EDUCATION/TRAINING PROGRAM

## 2017-11-26 PROCEDURE — 36000053 ZZH SURGERY LEVEL 2 EA 15 ADDTL MIN - UMMC: Performed by: INTERNAL MEDICINE

## 2017-11-26 PROCEDURE — 25000132 ZZH RX MED GY IP 250 OP 250 PS 637: Performed by: INTERNAL MEDICINE

## 2017-11-26 PROCEDURE — 96375 TX/PRO/DX INJ NEW DRUG ADDON: CPT | Performed by: EMERGENCY MEDICINE

## 2017-11-26 PROCEDURE — 00000146 ZZHCL STATISTIC GLUCOSE BY METER IP

## 2017-11-26 PROCEDURE — 25000128 H RX IP 250 OP 636: Performed by: STUDENT IN AN ORGANIZED HEALTH CARE EDUCATION/TRAINING PROGRAM

## 2017-11-26 PROCEDURE — 80048 BASIC METABOLIC PNL TOTAL CA: CPT | Performed by: EMERGENCY MEDICINE

## 2017-11-26 RX ORDER — NALOXONE HYDROCHLORIDE 0.4 MG/ML
.1-.4 INJECTION, SOLUTION INTRAMUSCULAR; INTRAVENOUS; SUBCUTANEOUS
Status: DISCONTINUED | OUTPATIENT
Start: 2017-11-26 | End: 2017-11-27 | Stop reason: HOSPADM

## 2017-11-26 RX ORDER — DEXAMETHASONE SODIUM PHOSPHATE 4 MG/ML
INJECTION, SOLUTION INTRA-ARTICULAR; INTRALESIONAL; INTRAMUSCULAR; INTRAVENOUS; SOFT TISSUE PRN
Status: DISCONTINUED | OUTPATIENT
Start: 2017-11-26 | End: 2017-11-26

## 2017-11-26 RX ORDER — FENTANYL CITRATE 50 UG/ML
INJECTION, SOLUTION INTRAMUSCULAR; INTRAVENOUS PRN
Status: DISCONTINUED | OUTPATIENT
Start: 2017-11-26 | End: 2017-11-26

## 2017-11-26 RX ORDER — ONDANSETRON 2 MG/ML
4 INJECTION INTRAMUSCULAR; INTRAVENOUS EVERY 30 MIN PRN
Status: DISCONTINUED | OUTPATIENT
Start: 2017-11-26 | End: 2017-11-26 | Stop reason: HOSPADM

## 2017-11-26 RX ORDER — FENTANYL CITRATE 50 UG/ML
25-50 INJECTION, SOLUTION INTRAMUSCULAR; INTRAVENOUS
Status: DISCONTINUED | OUTPATIENT
Start: 2017-11-26 | End: 2017-11-26 | Stop reason: HOSPADM

## 2017-11-26 RX ORDER — FENTANYL CITRATE 50 UG/ML
400 INJECTION, SOLUTION INTRAMUSCULAR; INTRAVENOUS ONCE
Status: COMPLETED | OUTPATIENT
Start: 2017-11-26 | End: 2017-11-26

## 2017-11-26 RX ORDER — SODIUM CHLORIDE, SODIUM LACTATE, POTASSIUM CHLORIDE, CALCIUM CHLORIDE 600; 310; 30; 20 MG/100ML; MG/100ML; MG/100ML; MG/100ML
INJECTION, SOLUTION INTRAVENOUS CONTINUOUS
Status: DISCONTINUED | OUTPATIENT
Start: 2017-11-26 | End: 2017-11-26 | Stop reason: HOSPADM

## 2017-11-26 RX ORDER — LIDOCAINE HYDROCHLORIDE 20 MG/ML
INJECTION, SOLUTION INFILTRATION; PERINEURAL PRN
Status: DISCONTINUED | OUTPATIENT
Start: 2017-11-26 | End: 2017-11-26

## 2017-11-26 RX ORDER — LABETALOL HYDROCHLORIDE 5 MG/ML
10 INJECTION, SOLUTION INTRAVENOUS
Status: DISCONTINUED | OUTPATIENT
Start: 2017-11-26 | End: 2017-11-26 | Stop reason: HOSPADM

## 2017-11-26 RX ORDER — SIMETHICONE
LIQUID (ML) MISCELLANEOUS PRN
Status: DISCONTINUED | OUTPATIENT
Start: 2017-11-26 | End: 2017-11-26 | Stop reason: HOSPADM

## 2017-11-26 RX ORDER — LIDOCAINE 40 MG/G
CREAM TOPICAL
Status: DISCONTINUED | OUTPATIENT
Start: 2017-11-26 | End: 2017-11-26 | Stop reason: HOSPADM

## 2017-11-26 RX ORDER — HYDROMORPHONE HYDROCHLORIDE 1 MG/ML
.3-.5 INJECTION, SOLUTION INTRAMUSCULAR; INTRAVENOUS; SUBCUTANEOUS EVERY 5 MIN PRN
Status: DISCONTINUED | OUTPATIENT
Start: 2017-11-26 | End: 2017-11-26 | Stop reason: HOSPADM

## 2017-11-26 RX ORDER — ONDANSETRON 2 MG/ML
4 INJECTION INTRAMUSCULAR; INTRAVENOUS ONCE
Status: COMPLETED | OUTPATIENT
Start: 2017-11-26 | End: 2017-11-26

## 2017-11-26 RX ORDER — ONDANSETRON 4 MG/1
4 TABLET, ORALLY DISINTEGRATING ORAL EVERY 30 MIN PRN
Status: DISCONTINUED | OUTPATIENT
Start: 2017-11-26 | End: 2017-11-26 | Stop reason: HOSPADM

## 2017-11-26 RX ORDER — ONDANSETRON 2 MG/ML
INJECTION INTRAMUSCULAR; INTRAVENOUS PRN
Status: DISCONTINUED | OUTPATIENT
Start: 2017-11-26 | End: 2017-11-26

## 2017-11-26 RX ORDER — PROPOFOL 10 MG/ML
INJECTION, EMULSION INTRAVENOUS PRN
Status: DISCONTINUED | OUTPATIENT
Start: 2017-11-26 | End: 2017-11-26

## 2017-11-26 RX ORDER — METOCLOPRAMIDE HYDROCHLORIDE 5 MG/ML
10 INJECTION INTRAMUSCULAR; INTRAVENOUS ONCE
Status: COMPLETED | OUTPATIENT
Start: 2017-11-26 | End: 2017-11-26

## 2017-11-26 RX ORDER — FLUMAZENIL 0.1 MG/ML
0.2 INJECTION, SOLUTION INTRAVENOUS
Status: ACTIVE | OUTPATIENT
Start: 2017-11-26 | End: 2017-11-27

## 2017-11-26 RX ORDER — LORAZEPAM 2 MG/ML
1 INJECTION INTRAMUSCULAR ONCE
Status: COMPLETED | OUTPATIENT
Start: 2017-11-26 | End: 2017-11-26

## 2017-11-26 RX ORDER — ALBUTEROL SULFATE 0.83 MG/ML
2.5 SOLUTION RESPIRATORY (INHALATION) EVERY 4 HOURS PRN
Status: DISCONTINUED | OUTPATIENT
Start: 2017-11-26 | End: 2017-11-26 | Stop reason: HOSPADM

## 2017-11-26 RX ADMIN — METOCLOPRAMIDE 10 MG: 5 INJECTION, SOLUTION INTRAMUSCULAR; INTRAVENOUS at 16:53

## 2017-11-26 RX ADMIN — ROCURONIUM BROMIDE 10 MG: 10 INJECTION INTRAVENOUS at 21:47

## 2017-11-26 RX ADMIN — FENTANYL CITRATE 50 MCG: 50 INJECTION, SOLUTION INTRAMUSCULAR; INTRAVENOUS at 18:51

## 2017-11-26 RX ADMIN — MIDAZOLAM 0.5 MG: 1 INJECTION INTRAMUSCULAR; INTRAVENOUS at 19:00

## 2017-11-26 RX ADMIN — MIDAZOLAM 1 MG: 1 INJECTION INTRAMUSCULAR; INTRAVENOUS at 18:55

## 2017-11-26 RX ADMIN — MIDAZOLAM 2 MG: 1 INJECTION INTRAMUSCULAR; INTRAVENOUS at 18:51

## 2017-11-26 RX ADMIN — GLUCAGON HYDROCHLORIDE 1 MG: 1 INJECTION, POWDER, FOR SOLUTION INTRAMUSCULAR; INTRAVENOUS; SUBCUTANEOUS at 17:01

## 2017-11-26 RX ADMIN — SUGAMMADEX 120 MG: 100 INJECTION, SOLUTION INTRAVENOUS at 21:52

## 2017-11-26 RX ADMIN — PHENYLEPHRINE HYDROCHLORIDE 50 MCG: 10 INJECTION, SOLUTION INTRAMUSCULAR; INTRAVENOUS; SUBCUTANEOUS at 21:41

## 2017-11-26 RX ADMIN — PHENYLEPHRINE HYDROCHLORIDE 100 MCG: 10 INJECTION, SOLUTION INTRAMUSCULAR; INTRAVENOUS; SUBCUTANEOUS at 21:28

## 2017-11-26 RX ADMIN — LORAZEPAM 1 MG: 2 INJECTION, SOLUTION INTRAMUSCULAR; INTRAVENOUS at 16:57

## 2017-11-26 RX ADMIN — PHENYLEPHRINE HYDROCHLORIDE 100 MCG: 10 INJECTION, SOLUTION INTRAMUSCULAR; INTRAVENOUS; SUBCUTANEOUS at 21:15

## 2017-11-26 RX ADMIN — PROPOFOL 90 MG: 10 INJECTION, EMULSION INTRAVENOUS at 20:56

## 2017-11-26 RX ADMIN — ROCURONIUM BROMIDE 10 MG: 10 INJECTION INTRAVENOUS at 21:23

## 2017-11-26 RX ADMIN — PHENYLEPHRINE HYDROCHLORIDE 100 MCG: 10 INJECTION, SOLUTION INTRAMUSCULAR; INTRAVENOUS; SUBCUTANEOUS at 21:19

## 2017-11-26 RX ADMIN — ONDANSETRON 4 MG: 2 INJECTION INTRAMUSCULAR; INTRAVENOUS at 16:55

## 2017-11-26 RX ADMIN — ONDANSETRON 4 MG: 2 INJECTION INTRAMUSCULAR; INTRAVENOUS at 21:11

## 2017-11-26 RX ADMIN — FENTANYL CITRATE 50 MCG: 50 INJECTION, SOLUTION INTRAMUSCULAR; INTRAVENOUS at 21:03

## 2017-11-26 RX ADMIN — FENTANYL CITRATE 25 MCG: 50 INJECTION, SOLUTION INTRAMUSCULAR; INTRAVENOUS at 19:00

## 2017-11-26 RX ADMIN — PHENYLEPHRINE HYDROCHLORIDE 100 MCG: 10 INJECTION, SOLUTION INTRAMUSCULAR; INTRAVENOUS; SUBCUTANEOUS at 21:35

## 2017-11-26 RX ADMIN — LIDOCAINE HYDROCHLORIDE 100 MG: 20 INJECTION, SOLUTION INFILTRATION; PERINEURAL at 20:56

## 2017-11-26 RX ADMIN — DEXAMETHASONE SODIUM PHOSPHATE 4 MG: 4 INJECTION, SOLUTION INTRA-ARTICULAR; INTRALESIONAL; INTRAMUSCULAR; INTRAVENOUS; SOFT TISSUE at 20:56

## 2017-11-26 RX ADMIN — Medication 2 ML: at 18:30

## 2017-11-26 RX ADMIN — ROCURONIUM BROMIDE 70 MG: 10 INJECTION INTRAVENOUS at 20:56

## 2017-11-26 RX ADMIN — GLUCAGON HYDROCHLORIDE 0.4 MG: 1 INJECTION, POWDER, FOR SOLUTION INTRAMUSCULAR; INTRAVENOUS; SUBCUTANEOUS at 21:45

## 2017-11-26 ASSESSMENT — COPD QUESTIONNAIRES
CAT_SEVERITY: MODERATE
COPD: 1

## 2017-11-26 ASSESSMENT — LIFESTYLE VARIABLES: TOBACCO_USE: 1

## 2017-11-26 NOTE — ED NOTES
"Pt arrived via ambulance from [facility] reporting vomiting of all pills, food and water after choking from a turkey this am.  Pt was able to clear the piece of turkey herself but now reports she is unable to keep \"anything down\" and has throat pain.  Turkey was brought in by her son and pt thought she would be able to eat it despite her mechanical diet status. Pt A & O,  able to breath easily and speak in fully sentences.   Pt has extensive medical history, see chart for details.  "

## 2017-11-26 NOTE — IP AVS SNAPSHOT
MRN:[id number]                      After Visit Summary   11/26/2017    Sonya Foote    MRN: [id number]           Thank you!     Thank you for choosing [facility] for your care. Our goal is always to provide you with excellent care. Hearing back from our patients is one way we can continue to improve our services. Please take a few minutes to complete the written survey that you may receive in the mail after you visit with us. Thank you!        Patient Information     Date Of Birth          1950        About your hospital stay     You were admitted on:  November 26, 2017 You last received care in the:  [facility]    You were discharged on:  November 27, 2017        Reason for your hospital stay       Ms. Foote,  You underwent EGD under conscious sedation to remove food stuck in your throat.  You were watched overnight for clinical monitoring.  Gastroenterology wants you NOT eat beyond your mechanical soft diet order  Continue your Protonix daily  Follow up with outpatient GI in 2-3 weeks for repeat EGD  Follow up with your primary care physician in one week.  Return to the emergency department for fever, chest pain, shortness of breath, worsening symptoms or concerns.                  Who to Call     For medical emergencies, please call 911.  For non-urgent questions about your medical care, please call your primary care provider or clinic, [phone]  For questions related to your surgery, please call your surgery clinic        Attending Provider     Provider Specialty    Cary Dick MD Emergency Medicine    Yves, Ky Sky MD Emergency Medicine    Augustine, Allan Estrada MD Emergency Medicine       Primary Care Provider Office Phone # Fax #    Allan Casey [phone]       When to contact your care team       Return to the emergency department for chest pain, shortness of breath, unable to keep down antibiotics, fever, worsening symptoms  or concerns                  After Care Instructions     Activity       Your activity upon discharge: activity as tolerated            Diet       Follow this diet upon discharge: Orders Placed This Encounter      Advance Diet as Tolerated:Mechanical/Dental Soft Diet                  Follow-up Appointments     Adult [facility] Follow-up and recommended labs and tests       Follow up with primary care provider, Allan Casey, within 7 days for hospital follow- up.  No follow up labs or test are needed.    Follow up with outpatient GI clinic in 2-3 weeks for repeat EGD    Appointments on [place] and/or [facility] (with [facility] or [facility] provider or service). Call [phone] if you haven't heard regarding these appointments within 7 days of discharge.                  Your next 10 appointments already scheduled     Dec 06, 2017 10:00 AM CST   Evaluation with Manuela Mitchell OT   [facility], [place], Occupational Therapy, Vision - Outpatie ([facility])    [address]  9th Floor, Clinic 9a  [facility] [place] [facility] [address]   [phone]            Dec 29, 2017 10:00 AM CST   (Arrive by 9:45 AM)   New Patient Visit with VICTOR M Floyd [facility] ([facility] [place])    [address]  4th Floor  [place] [address]   [phone]            Feb 19, 2018  9:30 AM CST   (Arrive by 9:15 AM)   Return Visit with Alina Jennings MD   [facility] ([facility] [place])    [address]  3rd Floor  [place] [address]   [phone]            Mar 06, 2018  9:15 AM CST   VISUAL FIELD with [facility] EYE VISUAL FIELD   Eye Clinic ([facility])    [facility] [address]  [address]  9th Fl Clin 9a  [place] [facility] [address]   [phone]            Mar 06, 2018  9:45 AM CST   RETURN GLAUCOMA with Marely Robin MD   Eye  "Clinic ([facility])    [address]  [address]  [address] Clin 9a  [place] [facility] [address] [phone]            May 11, 2018 10:50 AM CDT   (Arrive by 10:35 AM)   RETURN DIABETES with Michelle Irizarry MD   [facility] ([facility] [place])    [address]  3rd Floor  [place] [facility] [id number] [phone]              Pending Results     No orders found for last 3 day(s).            Statement of Approval     Ordered          17 1005  I have reviewed and agree with all the recommendations and orders detailed in this document.  EFFECTIVE NOW     Approved and electronically signed by:  Leo Meyer APRN CNP             Admission Information     Date & Time Provider Department Dept. Phone    2017 Allan Bradshaw MD Unit 6D Observation [facility] [phone]      Your Vitals Were     Blood Pressure Temperature Respirations Pulse Oximetry          144/65 (BP Location: Right arm) 98.1  F (36.7  C) (Oral) 16 96%        MyChart Information     [facility] lets you send messages to your doctor, view your test results, renew your prescriptions, schedule appointments and more. To sign up, go to [email or web address] . Click on \"Log in\" on the left side of the screen, which will take you to the Welcome page. Then click on \"Sign up Now\" on the right side of the page.     You will be asked to enter the access code listed below, as well as some personal information. Please follow the directions to create your username and password.     Your access code is: [email or web address]  Expires: 2018 11:09 AM     Your access code will  in 90 days. If you need help or a new code, please call your [place] clinic or [phone].        Care EveryWhere ID     This is your Care EveryWhere ID. This could be used by other organizations to access your [place] medical records  [id number]        Equal Access to Services     KARI CARREON " AH: Hadii shaheen Cohen, waaxda luqadaha, qaybta kadiana irvinetta, tonie blanco haytuan mckinneylailalizandro marroquin. So [facility] [phone].    ATENCIÓN: Si habla agatha, tiene a briones disposición servicios gratuitos de asistencia lingüística. Llame al [phone].    We comply with applicable federal civil rights laws and [place] laws. We do not discriminate on the basis of race, color, national origin, age, disability, sex, sexual orientation, or gender identity.               Review of your medicines      CONTINUE these medicines which may have CHANGED, or have new prescriptions. If we are uncertain of the size of tablets/capsules you have at home, strength may be listed as something that might have changed.        Dose / Directions    aspirin 81 MG EC tablet   This may have changed:  when to take this   Used for:  Unstable angina (H)        Dose:  81 mg   Take 1 tablet (81 mg) by mouth daily   Quantity:  90 tablet   Refills:  3       atorvastatin 40 MG tablet   Commonly known as:  LIPITOR   This may have changed:  when to take this   Used for:  Hyperlipidemia LDL goal <100        Dose:  40 mg   Take 1 tablet (40 mg) by mouth daily   Quantity:  90 tablet   Refills:  3       brimonidine 0.2 % ophthalmic solution   Commonly known as:  ALPHAGAN   This may have changed:  when to take this   Used for:  Primary open angle glaucoma of both eyes, severe stage        Dose:  1 drop   Place 1 drop into the right eye 3 times daily   Quantity:  15 mL   Refills:  11       hydrochlorothiazide 12.5 MG capsule   Commonly known as:  MICROZIDE   This may have changed:  additional instructions   Used for:  Essential hypertension with goal blood pressure less than 130/80        Dose:  12.5 mg   Take 1 capsule (12.5 mg) by mouth daily   Quantity:  90 capsule   Refills:  3       Phenytoin Sodium Extended 200 MG Caps   This may have changed:  additional instructions   Used for:  Seizure disorder (H)        Dose:  200 mg   Take 200  mg by mouth 2 times daily   Quantity:  60 capsule   Refills:  0         CONTINUE these medicines which have NOT CHANGED        Dose / Directions    ACETAMINOPHEN PO        Dose:  1000 mg   Take 1,000 mg by mouth every 6 hours as needed for fever Taking q4-6 hours, per MAR   Refills:  0       ADVAIR DISKUS 250-50 MCG/DOSE diskus inhaler   Generic drug:  fluticasone-salmeterol        Dose:  1 puff   Inhale 1 puff into the lungs 2 times daily   Refills:  0       * albuterol (2.5 MG/3ML) 0.083% neb solution   Used for:  Chronic obstructive pulmonary disease, unspecified COPD type (H)        INHALE 1 VIAL VIA NEBULIZER EVERY 6 HOURS AS NEEDED   Quantity:  360 mL   Refills:  11       * albuterol 108 (90 BASE) MCG/ACT Inhaler   Commonly known as:  PROAIR HFA/PROVENTIL HFA/VENTOLIN HFA   Used for:  JOSE (obstructive sleep apnea)        Dose:  2 puff   Inhale 2 puffs into the lungs every 6 hours as needed for shortness of breath / dyspnea or wheezing   Quantity:  3 Inhaler   Refills:  1       blood glucose monitoring lancets   Used for:  Type 2 diabetes mellitus with diabetic peripheral angiopathy without gangrene, without long-term current use of insulin (H)        Use to test blood sugar 3 times daily or as directed.   Quantity:  100 each   Refills:  PRN       blood glucose monitoring meter device kit   Used for:  Type 2 diabetes, HbA1C goal < 8% (H)        Use to test blood sugars 3 times daily or as directed.   Quantity:  1 kit   Refills:  0       blood glucose monitoring test strip   Commonly known as:  ONETOUCH ULTRA   Used for:  Type 2 diabetes mellitus with diabetic peripheral angiopathy without gangrene, without long-term current use of insulin (H)        Use to test blood sugars 3 times daily or as directed.   Quantity:  3 Box   Refills:  3       calcium citrate-vitamin D 315-250 MG-UNIT Tabs per tablet   Commonly known as:  CITRACAL   Used for:  Osteoporosis        Dose:  2 tablet   Take 2 tablets by mouth daily    Quantity:  120 tablet   Refills:  5       cetirizine 10 MG tablet   Commonly known as:  zyrTEC   Used for:  Seasonal allergic rhinitis, unspecified allergic rhinitis trigger        Dose:  10 mg   Take 1 tablet (10 mg) by mouth daily as needed for allergies   Quantity:  90 tablet   Refills:  3       CYANOCOBALAMIN PO        Dose:  2000 mcg   Take 2,000 mcg by mouth daily   Refills:  0       cyclobenzaprine 10 MG tablet   Commonly known as:  FLEXERIL   Used for:  Cervicalgia        Dose:  10 mg   Take 1 tablet (10 mg) by mouth 2 times daily as needed for muscle spasms   Quantity:  30 tablet   Refills:  1       diclofenac 1 % Gel topical gel   Commonly known as:  VOLTAREN   Used for:  Primary osteoarthritis of both hands        Dose:  2 g   Apply 2 g topically 4 times daily to hands   Quantity:  100 g   Refills:  11       dorzolamide 2 % ophthalmic solution   Commonly known as:  TRUSOPT        Dose:  1 drop   Place 1 drop into both eyes 2 times daily   Refills:  0       dorzolamide-timolol 2-0.5 % ophthalmic solution   Commonly known as:  COSOPT   Used for:  Primary open angle glaucoma of both eyes, severe stage        Dose:  1 drop   Place 1 drop into the right eye 2 times daily   Quantity:  1 Bottle   Refills:  11       EPINEPHrine 0.15 MG/0.3ML injection 2-pack   Commonly known as:  EPIPEN JR   Used for:  Bee sting reaction, undetermined intent, subsequent encounter        Dose:  0.15 mg   Inject 0.3 mLs (0.15 mg) into the muscle as needed for anaphylaxis   Quantity:  0.6 mL   Refills:  1       escitalopram 5 MG tablet   Commonly known as:  LEXAPRO   Used for:  Adjustment disorder with depressed mood        Dose:  10 mg   Take 2 tablets (10 mg) by mouth daily   Quantity:  60 tablet   Refills:  5       estradiol 1 MG tablet   Commonly known as:  ESTRACE   Used for:  Person who has had sex change operation        Dose:  1 mg   Take 1 tablet (1 mg) by mouth daily   Quantity:  90 tablet   Refills:  3       ferrous  sulfate 325 (65 FE) MG tablet   Commonly known as:  IRON        Dose:  325 mg   Take 1 tablet (325 mg) by mouth 2 times daily With meals   Quantity:  60 tablet   Refills:  2       fluticasone 50 MCG/ACT spray   Commonly known as:  FLONASE        Dose:  1 spray   Spray 1 spray into both nostrils daily   Refills:  0       INCRUSE ELLIPTA 62.5 MCG/INH oral inhaler   Generic drug:  umeclidinium        Dose:  1 puff   Inhale 1 puff into the lungs daily   Refills:  0       RENÉE Pack        Dose:  1 packet   Take 1 packet by mouth 2 times daily   Refills:  0       latanoprost 0.005 % ophthalmic solution   Commonly known as:  XALATAN   Used for:  Primary open angle glaucoma, stage unspecified        Dose:  1 drop   Place 1 drop into both eyes At Bedtime   Quantity:  2.5 mL   Refills:  11       levETIRAcetam 500 MG tablet   Commonly known as:  KEPPRA   Used for:  Nausea        Dose:  500 mg   Take 1 tablet (500 mg) by mouth 2 times daily   Quantity:  180 tablet   Refills:  1       lidocaine 5 % Patch   Commonly known as:  LIDODERM   Used for:  Chronic pain syndrome, 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 (H)        APPLY 1 TO 3 PATCHES TO PAINFUL AREA AT ONCE FOR UP TO 12 HOURS WITHIN A 24 HOUR PERIOD REMOVE AFTER 12 HOURS   Quantity:  30 patch   Refills:  5       lisinopril 10 MG tablet   Commonly known as:  PRINIVIL/ZESTRIL   Used for:  Essential hypertension with goal blood pressure less than 130/80        Dose:  10 mg   Take 1 tablet (10 mg) by mouth daily   Quantity:  90 tablet   Refills:  3       MEDICATION GIVEN BY INTRATHECAL PUMP - INSTRUCTION        continuous May 19, 2017 - per [facility] in [place] [phone]: Conc: Bupivacaine 20 mg/mL and Fentanyl 2000 mcg/mL. Continuous: Bupivacaine 7.265 mg/day and Fentanyl 726.5 mcg/day. Boluses: Up to 7 boluses per 24-hr period of Bupivicaine 0.599 mg and Fentanyl 59.9 mcg  Pump Refill Date at [facility]: 7/2/17    Refills:  0       metFORMIN 500 MG 24 hr tablet   Commonly known as:  GLUCOPHAGE-XR   Used for:  Type 2 diabetes mellitus with diabetic peripheral angiopathy and gangrene, without long-term current use of insulin (H)        Dose:  500 mg   Take 1 tablet (500 mg) by mouth daily (with dinner)   Quantity:  90 tablet   Refills:  3       metoprolol 25 MG 24 hr tablet   Commonly known as:  TOPROL-XL   Used for:  Old myocardial infarction        TAKE 1 TABLET (25 MG) BY MOUTH DAILY   Quantity:  30 tablet   Refills:  10       MULTIVITAMIN PO        Dose:  1 tablet   Take 1 tablet by mouth daily   Refills:  0       nitroGLYcerin 0.4 MG sublingual tablet   Commonly known as:  NITROSTAT   Used for:  Chronic systolic congestive heart failure (H), Old myocardial infarction        Dose:  0.4 mg   Place 1 tablet (0.4 mg) under the tongue every 5 minutes as needed for chest pain if you are still having symptoms after 3 doses (15 minutes) call 911.   Quantity:  25 tablet   Refills:  1       ONDANSETRON PO        Dose:  4 mg   Take 4 mg by mouth 3 times daily   Refills:  0       * order for DME   Used for:  Incontinence        Equipment being ordered: Depends briefs   Quantity:  1 Month   Refills:  11       * order for DME   Used for:  CVA (cerebral infarction), HTN (hypertension)        Equipment being ordered: Power Wheelchair   Quantity:  1 Device   Refills:  0       * order for DME   Used for:  Type 2 diabetes mellitus with other diabetic neurological complication        Equipment being ordered: Glucerna daily shakes with each meal   Quantity:  1 Box   Refills:  11       * order for DME   Used for:  Simple chronic bronchitis (H)        Equipment being ordered: Nebulizer and tubing supplies   Quantity:  1 Units   Refills:  0       * order for DME   Used for:  Chronic obstructive pulmonary disease, unspecified COPD type (H)        Equipment being ordered: CPAP supplies mask, hose, filters, cushion fax to [facility] at  [phone] Disp: 10 DeviceRefills: prn Class: Local PrintStart: 2/10/2017   Quantity:  1 Device   Refills:  0       * order for DME   Used for:  COPD (chronic obstructive pulmonary disease) (H)        Equipment being ordered: CPAP supplies mask, hose, filters, cushion  fax to [facility] at [phone]   Quantity:  10 Device   Refills:  prn       * order for DME   Used for:  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 (H)        Hospital bed with side rails   Quantity:  1 Device   Refills:  0       * order for DME   Used for:  Status post bilateral above knee amputation (H)        Full electric hospital bed with half rails  Dx: B00431, I110, J449 Length of need: lifetime   Quantity:  1 Device   Refills:  0       * order for DME   Used for:  Status post bilateral above knee amputation (H)        Wheel Chair Cushion: 18 x 18 inch Roho cushion   Quantity:  1 Device   Refills:  0       pantoprazole 40 MG EC tablet   Commonly known as:  PROTONIX        Dose:  40 mg   Take 40 mg by mouth daily   Refills:  0       polyethylene glycol Packet   Commonly known as:  MIRALAX/GLYCOLAX        Dose:  17 g   Take 17 g by mouth daily as needed Dissolved in water or juice   Refills:  0       pregabalin 75 MG capsule   Commonly known as:  LYRICA   Used for:  Pain in both upper extremities        Dose:  150 mg   Take 2 capsules (150 mg) by mouth 2 times daily   Quantity:  120 capsule   Refills:  5       prochlorperazine 10 MG tablet   Commonly known as:  COMPAZINE   Used for:  Nausea with vomiting        Dose:  10 mg   Take 1 tablet (10 mg) by mouth every 8 hours as needed   Quantity:  20 tablet   Refills:  0       risperiDONE 0.5 MG tablet   Commonly known as:  risperDAL        Dose:  0.5 mg   Take 0.5 mg by mouth At Bedtime   Refills:  0       sucralfate 1 GM tablet   Commonly known as:  CARAFATE   Used for:  Adjustment disorder with depressed mood        Dose:  1 g   Take 1 tablet (1 g) by mouth 4 times daily  May dissolve in 10 mL water is necessary. (Start upon completion of carafate suspension)   Quantity:  120 tablet   Refills:  11       traZODone 100 MG tablet   Commonly known as:  DESYREL   Used for:  Anxiety state        Dose:  150 mg   Take 1.5 tablets (150 mg) by mouth At Bedtime   Quantity:  90 tablet   Refills:  3       vitamin D 2000 UNITS tablet        Dose:  2000 Units   Take 2,000 Units by mouth daily.   Quantity:  100 tablet   Refills:  3       zinc 50 MG Tabs        Dose:  1 tablet   Take 1 tablet by mouth daily   Refills:  0       * Notice:  This list has 11 medication(s) that are the same as other medications prescribed for you. Read the directions carefully, and ask your doctor or other care provider to review them with you.             Protect others around you: Learn how to safely use, store and throw away your medicines at www.disposemymeds.org.             Medication List: This is a list of all your medications and when to take them. Check marks below indicate your daily home schedule. Keep this list as a reference.      Medications           Morning Afternoon Evening Bedtime As Needed    ACETAMINOPHEN PO   Take 1,000 mg by mouth every 6 hours as needed for fever Taking q4-6 hours, per MAR                                ADVAIR DISKUS 250-50 MCG/DOSE diskus inhaler   Inhale 1 puff into the lungs 2 times daily   Generic drug:  fluticasone-salmeterol                                * albuterol (2.5 MG/3ML) 0.083% neb solution   INHALE 1 VIAL VIA NEBULIZER EVERY 6 HOURS AS NEEDED                                * albuterol 108 (90 BASE) MCG/ACT Inhaler   Commonly known as:  PROAIR HFA/PROVENTIL HFA/VENTOLIN HFA   Inhale 2 puffs into the lungs every 6 hours as needed for shortness of breath / dyspnea or wheezing                                aspirin 81 MG EC tablet   Take 1 tablet (81 mg) by mouth daily                                atorvastatin 40 MG tablet   Commonly known as:  LIPITOR   Take 1 tablet  (40 mg) by mouth daily                                blood glucose monitoring lancets   Use to test blood sugar 3 times daily or as directed.                                blood glucose monitoring meter device kit   Use to test blood sugars 3 times daily or as directed.                                blood glucose monitoring test strip   Commonly known as:  ONETOUCH ULTRA   Use to test blood sugars 3 times daily or as directed.                                brimonidine 0.2 % ophthalmic solution   Commonly known as:  ALPHAGAN   Place 1 drop into the right eye 3 times daily                                calcium citrate-vitamin D 315-250 MG-UNIT Tabs per tablet   Commonly known as:  CITRACAL   Take 2 tablets by mouth daily                                cetirizine 10 MG tablet   Commonly known as:  zyrTEC   Take 1 tablet (10 mg) by mouth daily as needed for allergies                                CYANOCOBALAMIN PO   Take 2,000 mcg by mouth daily                                cyclobenzaprine 10 MG tablet   Commonly known as:  FLEXERIL   Take 1 tablet (10 mg) by mouth 2 times daily as needed for muscle spasms                                diclofenac 1 % Gel topical gel   Commonly known as:  VOLTAREN   Apply 2 g topically 4 times daily to hands   Last time this was given:  2 g on 11/27/2017  8:16 AM                                dorzolamide 2 % ophthalmic solution   Commonly known as:  TRUSOPT   Place 1 drop into both eyes 2 times daily                                dorzolamide-timolol 2-0.5 % ophthalmic solution   Commonly known as:  COSOPT   Place 1 drop into the right eye 2 times daily                                EPINEPHrine 0.15 MG/0.3ML injection 2-pack   Commonly known as:  EPIPEN JR   Inject 0.3 mLs (0.15 mg) into the muscle as needed for anaphylaxis                                escitalopram 5 MG tablet   Commonly known as:  LEXAPRO   Take 2 tablets (10 mg) by mouth daily   Last time this was given:   10 mg on 11/27/2017  8:16 AM                                estradiol 1 MG tablet   Commonly known as:  ESTRACE   Take 1 tablet (1 mg) by mouth daily   Last time this was given:  1 mg on 11/27/2017  8:16 AM                                ferrous sulfate 325 (65 FE) MG tablet   Commonly known as:  IRON   Take 1 tablet (325 mg) by mouth 2 times daily With meals                                fluticasone 50 MCG/ACT spray   Commonly known as:  FLONASE   Spray 1 spray into both nostrils daily                                hydrochlorothiazide 12.5 MG capsule   Commonly known as:  MICROZIDE   Take 1 capsule (12.5 mg) by mouth daily   Last time this was given:  12.5 mg on 11/27/2017  8:16 AM                                INCRUSE ELLIPTA 62.5 MCG/INH oral inhaler   Inhale 1 puff into the lungs daily   Generic drug:  umeclidinium                                RENÉE Pack   Take 1 packet by mouth 2 times daily                                latanoprost 0.005 % ophthalmic solution   Commonly known as:  XALATAN   Place 1 drop into both eyes At Bedtime                                levETIRAcetam 500 MG tablet   Commonly known as:  KEPPRA   Take 1 tablet (500 mg) by mouth 2 times daily   Last time this was given:  500 mg on 11/27/2017  8:16 AM                                lidocaine 5 % Patch   Commonly known as:  LIDODERM   APPLY 1 TO 3 PATCHES TO PAINFUL AREA AT ONCE FOR UP TO 12 HOURS WITHIN A 24 HOUR PERIOD REMOVE AFTER 12 HOURS                                lisinopril 10 MG tablet   Commonly known as:  PRINIVIL/ZESTRIL   Take 1 tablet (10 mg) by mouth daily   Last time this was given:  10 mg on 11/27/2017  8:17 AM                                MEDICATION GIVEN BY INTRATHECAL PUMP - INSTRUCTION   continuous May 19, 2017 - per [facility] in [place] [phone]: Conc: Bupivacaine 20 mg/mL and Fentanyl 2000 mcg/mL. Continuous: Bupivacaine 7.265 mg/day and Fentanyl 726.5 mcg/day. Boluses: Up to  7 boluses per 24-hr period of Bupivicaine 0.599 mg and Fentanyl 59.9 mcg  Pump Refill Date at [facility]: 7/2/17                                metFORMIN 500 MG 24 hr tablet   Commonly known as:  GLUCOPHAGE-XR   Take 1 tablet (500 mg) by mouth daily (with dinner)                                metoprolol 25 MG 24 hr tablet   Commonly known as:  TOPROL-XL   TAKE 1 TABLET (25 MG) BY MOUTH DAILY   Last time this was given:  25 mg on 11/27/2017  8:16 AM                                MULTIVITAMIN PO   Take 1 tablet by mouth daily                                nitroGLYcerin 0.4 MG sublingual tablet   Commonly known as:  NITROSTAT   Place 1 tablet (0.4 mg) under the tongue every 5 minutes as needed for chest pain if you are still having symptoms after 3 doses (15 minutes) call 911.                                ONDANSETRON PO   Take 4 mg by mouth 3 times daily                                * order for DME   Equipment being ordered: Depends briefs                                * order for DME   Equipment being ordered: Power Wheelchair                                * order for DME   Equipment being ordered: Glucerna daily shakes with each meal                                * order for DME   Equipment being ordered: Nebulizer and tubing supplies                                * order for DME   Equipment being ordered: CPAP supplies mask, hose, filters, cushion fax to [facility] at [phone] Disp: 10 DeviceRefills: prn Class: Local PrintStart: 2/10/2017                                * order for DME   Equipment being ordered: CPAP supplies mask, hose, filters, cushion  fax to [facility] at [phone]                                * order for DME   Hospital bed with side rails                                * order for DME   Full electric hospital bed with half rails  Dx: M40245, I110, J449 Length of need: lifetime                                * order for DME   Wheel Chair Cushion: 18 x 18 inch Amie  cushion                                pantoprazole 40 MG EC tablet   Commonly known as:  PROTONIX   Take 40 mg by mouth daily   Last time this was given:  40 mg on 11/27/2017  8:16 AM                                Phenytoin Sodium Extended 200 MG Caps   Take 200 mg by mouth 2 times daily   Last time this was given:  200 mg on 11/27/2017  8:17 AM                                polyethylene glycol Packet   Commonly known as:  MIRALAX/GLYCOLAX   Take 17 g by mouth daily as needed Dissolved in water or juice                                pregabalin 75 MG capsule   Commonly known as:  LYRICA   Take 2 capsules (150 mg) by mouth 2 times daily   Last time this was given:  150 mg on 11/27/2017  8:16 AM                                prochlorperazine 10 MG tablet   Commonly known as:  COMPAZINE   Take 1 tablet (10 mg) by mouth every 8 hours as needed                                risperiDONE 0.5 MG tablet   Commonly known as:  risperDAL   Take 0.5 mg by mouth At Bedtime   Last time this was given:  0.5 mg on 11/27/2017  1:41 AM                                sucralfate 1 GM tablet   Commonly known as:  CARAFATE   Take 1 tablet (1 g) by mouth 4 times daily May dissolve in 10 mL water is necessary. (Start upon completion of carafate suspension)   Last time this was given:  1 g on 11/27/2017  8:16 AM                                traZODone 100 MG tablet   Commonly known as:  DESYREL   Take 1.5 tablets (150 mg) by mouth At Bedtime                                vitamin D 2000 UNITS tablet   Take 2,000 Units by mouth daily.                                zinc 50 MG Tabs   Take 1 tablet by mouth daily                                * Notice:  This list has 11 medication(s) that are the same as other medications prescribed for you. Read the directions carefully, and ask your doctor or other care provider to review them with you.              More Information        Esophageal Blockage, Resolved  The esophagus is the passage  that carries food from the mouth to the stomach. You had a blockage in the esophagus. This can happen after swallowing a large piece of food, taking a large pill, or swallowing foreign objects.  If this is a recurring problem, it can be a sign of disease in the esophagus, such as inflammation (swelling and irritation) or scarring. If you did not have a special procedure (endoscopy) today to treat your condition, further testing will be needed to evaluate this problem.  The blockage has cleared. You should be able to swallow normally again.  Home care    For the next 24 hours you may drink liquids and eat soft foods.    You may have been given medicine today to prevent pain and help you relax. If so, you may feel drowsy for the next 4 to 12 hours. Do not drive or operate dangerous equipment until you feel alert again.    If your esophagus was blocked by food, be sure to cut solid food into small pieces before putting it into your mouth. Chew all foods well before swallowing.    If your esophagus was blocked by an over-the-counter pill (such as a vitamin), avoid this size pill in the future. If it was blocked by a prescription medicine, ask your healthcare provider for another form of medicine.  Follow-up care  Follow up with your healthcare provider, or as advised. If you continue to have problems, contact your doctor or this facility for advice. If this is a recurring problem, talk to your healthcare provider about it. He or she may suggest having an endoscopy. This is a look in the esophagus with a small camera and light in a narrow, flexible tube).  When to seek medical advice  Call your healthcare provider right away if any of these occur:    Unable to swallow    Significant pain on swallowing    Fever of 100.4 F (38 C) or higher, or as directed by your healthcare provider  Call 911  Call 911 or get immediate medical care if any of the following occur:     Chest pain or shortness of breath    Vomiting blood (red  or black)    Blood in your stool (dark red or black color)   Date Last Reviewed: 5/1/2017 2000-2017 [employer]. [address] [place] [address], [place] [address]. All rights reserved. This information is not intended as a substitute for professional medical care. Always follow your healthcare professional's instructions.

## 2017-11-26 NOTE — ED PROVIDER NOTES
History     Chief Complaint   Patient presents with     Vomiting     pain     throat     HPI  Sonya Foote is a 67-year-old transgender female with a history of seizures, CVA, type 2 diabetes, schizoaffective disorder, COPD, among multiple other medical problems including esophageal stricture and history of food impaction  Due to her history of esophageal disease the patient is on a soft diet  This morning she was offered a turkey sandwich by her son. She ate it but had difficulty swallowing, coughed up a chunk of turkey, however felt a persistent infection. She attempted to drink water and medications, however, was unable to tolerate this. She is currently able to tolerate secretions, but not solids or liquids. She describes a pressure pain sensation and pointing to an area corresponding to the proximal third esophagus (points at the manubrium) otherwise she had been well prior to this event.      This part of the document was transcribed by Angie Martinez, Medical Scribe.   I have reviewed the Medications, Allergies, Past Medical and Surgical History, and Social History in the [facility] system.  Past Medical History:   Diagnosis Date     Anemia      Antiplatelet or antithrombotic long-term use      Arthritis      AS (sickle cell trait) (H) 10/8/2013     Blind left eye      BPPV (benign paroxysmal positional vertigo)      Chronic back pain      COPD (chronic obstructive pulmonary disease) (H)      Coronary artery disease      CVA (cerebral infarction) 10/8/2012    x3, residual left sided weakness     Diastolic CHF (H) 9/4/2012     Dilantin toxicity 5/31/2013     Esophageal candidiasis (H)      GERD (gastroesophageal reflux disease)      Heart attack      Hernia, abdominal      History of blood transfusion     x5     History of thrombophlebitis      HTN (hypertension) 9/4/2012     Hyperlipidemia LDL goal <100 9/4/2012     Legally blind in right eye, as defined in [place]     No periphal vision right eye     Osteoporosis  1/21/2013     Other chronic pain      PAD (peripheral artery disease) (H)      Palpitations      Person who has had sex change operation 1/20/2014     Pneumonia      Schizoaffective disorder (H)      Seizure disorder (H) 9/4/2012     Sleep apnea     CPAP     Thrombosis of leg      Type 2 diabetes, HbA1C goal < 8% (H) 9/4/2012     Uncomplicated asthma      Unspecified cerebral artery occlusion with cerebral infarction        Past Surgical History:   Procedure Laterality Date     AMPUTATE LEG ABOVE KNEE Left 6/11/2016    Procedure: AMPUTATE LEG ABOVE KNEE;  Surgeon: eMllo Rodriguez MD;  [facility]     AMPUTATE LEG BELOW KNEE Right 11/7/2016    Procedure: AMPUTATE LEG BELOW KNEE;  Surgeon: Savannah Durant MD;  [facility]     AMPUTATE REVISION STUMP LOWER EXTREMITY Right 11/11/2016    Procedure: AMPUTATE REVISION STUMP LOWER EXTREMITY;  Surgeon: Savannah Durant MD;  [facility]     AMPUTATE REVISION STUMP LOWER EXTREMITY Right 11/16/2016    Procedure: AMPUTATE REVISION STUMP LOWER EXTREMITY;  Surgeon: Savannah Durant MD;  [facility]     AMPUTATE TOE(S) Right 1/5/2016    Procedure: AMPUTATE TOE(S);  Surgeon: Mello Gaines DPM;  [facility]     ANGIOGRAM Bilateral 11/21/2014    Procedure: ANGIOGRAM;  Surgeon: Savannah Durant MD;  [facility]     ANGIOGRAM Left 1/16/2015    Procedure: ANGIOGRAM;  Surgeon: Savannah Durant MD;  [facility]     ANGIOGRAM Bilateral 9/14/2015    Procedure: ANGIOGRAM;  Surgeon: Savannah Durant MD;  [facility]     ANGIOGRAM Left 10/12/2015    Procedure: ANGIOGRAM;  Surgeon: Savannah Durant MD;  [facility]     ANGIOGRAM Right 6/6/2016    Procedure: ANGIOGRAM;  Surgeon: Savannah Durant MD;  [facility]     ANGIOPLASTY Right 6/6/2016    Procedure: ANGIOPLASTY;  Surgeon: Savannah Durant MD;  [facility]     APPENDECTOMY       BREAST SURGERY      right breast bx (benign)     CHOLECYSTECTOMY       COLONOSCOPY N/A 8/25/2014    Procedure: COLONOSCOPY;  Surgeon:  Mello Ferrer MD;  [facility]     COLONOSCOPY WITH CO2 INSUFFLATION N/A 8/20/2014    Procedure: COLONOSCOPY WITH CO2 INSUFFLATION;  Surgeon: Duane, William Charles, MD;  Location: [facility]     ENDARTERECTOMY FEMORAL  5/23/2014    Procedure: ENDARTERECTOMY FEMORAL;  Surgeon: Jason Joshi MD;  [facility]     ESOPHAGOSCOPY, GASTROSCOPY, DUODENOSCOPY (EGD), COMBINED  12/14/2012    Procedure: COMBINED ESOPHAGOSCOPY, GASTROSCOPY, DUODENOSCOPY (EGD), BIOPSY SINGLE OR MULTIPLE;  ESOPHAGOSCOPY, GASTROSCOPY, DUODENOSCOPY (EGD), DILATATION ;  Surgeon: Elizabeth Stevenson MD;  Location:  GI     ESOPHAGOSCOPY, GASTROSCOPY, DUODENOSCOPY (EGD), COMBINED  12/31/2013    Procedure: COMBINED ESOPHAGOSCOPY, GASTROSCOPY, DUODENOSCOPY (EGD), BIOPSY SINGLE OR MULTIPLE;;  Surgeon: Clemente Lopez MD;  [facility]     ESOPHAGOSCOPY, GASTROSCOPY, DUODENOSCOPY (EGD), COMBINED  4/1/2014    Procedure: COMBINED ESOPHAGOSCOPY, GASTROSCOPY, DUODENOSCOPY (EGD);;  Surgeon: Clemente Lopez MD;  [facility]     ESOPHAGOSCOPY, GASTROSCOPY, DUODENOSCOPY (EGD), COMBINED  6/28/2014    Procedure: COMBINED ESOPHAGOSCOPY, GASTROSCOPY, DUODENOSCOPY (EGD);  Surgeon: Clemente Lopez MD;  [facility]     ESOPHAGOSCOPY, GASTROSCOPY, DUODENOSCOPY (EGD), COMBINED N/A 8/20/2014    Procedure: COMBINED ESOPHAGOSCOPY, GASTROSCOPY, DUODENOSCOPY (EGD), BIOPSY SINGLE OR MULTIPLE;  Surgeon: Duane, William Charles, MD;  Location:  OR     ESOPHAGOSCOPY, GASTROSCOPY, DUODENOSCOPY (EGD), COMBINED N/A 8/22/2014    Procedure: COMBINED ESOPHAGOSCOPY, GASTROSCOPY, DUODENOSCOPY (EGD), BIOPSY SINGLE OR MULTIPLE;  Surgeon: Mello Ferrer MD;  [facility]     ESOPHAGOSCOPY, GASTROSCOPY, DUODENOSCOPY (EGD), COMBINED N/A 10/2/2014    Procedure: COMBINED ESOPHAGOSCOPY, GASTROSCOPY, DUODENOSCOPY (EGD), BIOPSY SINGLE OR MULTIPLE;  Surgeon: Remy Haskins MD;  Location:  GI     ESOPHAGOSCOPY, GASTROSCOPY, DUODENOSCOPY (EGD), COMBINED Left  12/15/2014    Procedure: COMBINED ESOPHAGOSCOPY, GASTROSCOPY, DUODENOSCOPY (EGD), BIOPSY SINGLE OR MULTIPLE;  Surgeon: Remy Haskins MD;  [facility]     ESOPHAGOSCOPY, GASTROSCOPY, DUODENOSCOPY (EGD), COMBINED N/A 2/25/2015    Procedure: COMBINED ENDOSCOPIC ULTRASOUND, ESOPHAGOSCOPY, GASTROSCOPY, DUODENOSCOPY (EGD), FINE NEEDLE ASPIRATE/BIOPSY;  Surgeon: Clemente Lugo MD;  [facility]     ESOPHAGOSCOPY, GASTROSCOPY, DUODENOSCOPY (EGD), COMBINED Left 2/25/2015    Procedure: COMBINED ESOPHAGOSCOPY, GASTROSCOPY, DUODENOSCOPY (EGD), BIOPSY SINGLE OR MULTIPLE;  Surgeon: Clemente Lugo MD;  [facility]     ESOPHAGOSCOPY, GASTROSCOPY, DUODENOSCOPY (EGD), COMBINED N/A 9/25/2016    Procedure: COMBINED ESOPHAGOSCOPY, GASTROSCOPY, DUODENOSCOPY (EGD);  Surgeon: Aziza Patiño MD;  [facility]     ESOPHAGOSCOPY, GASTROSCOPY, DUODENOSCOPY (EGD), COMBINED N/A 1/18/2017    Procedure: COMBINED ESOPHAGOSCOPY, GASTROSCOPY, DUODENOSCOPY (EGD), BIOPSY SINGLE OR MULTIPLE;  Surgeon: Clemente Lopez MD;  [facility]     ESOPHAGOSCOPY, GASTROSCOPY, DUODENOSCOPY (EGD), COMBINED N/A 11/26/2017    Procedure: COMBINED ESOPHAGOSCOPY, GASTROSCOPY, DUODENOSCOPY (EGD), REMOVE FOREIGN BODY;  Esophagogastroduodenoscopy with foreign body extraction  ;  Surgeon: Herberth Castrejon MD;  [facility]     ESOPHAGOSCOPY, GASTROSCOPY, DUODENOSCOPY (EGD), COMBINED N/A 11/26/2017    Procedure: COMBINED ESOPHAGOSCOPY, GASTROSCOPY, DUODENOSCOPY (EGD), REMOVE FOREIGN BODY;;  Surgeon: Herberth Castrejon MD;  [facility]     FASCIOTOMY LOWER EXTREMITY Left 6/10/2016    Procedure: FASCIOTOMY LOWER EXTREMITY;  Surgeon: Mello Rodriguez MD;  [facility]     HC CAPSULE ENDOSCOPY N/A 8/25/2014    Procedure: CAPSULE/PILL CAM ENDOSCOPY;  Surgeon: Remy Haskins MD;  Location:  GI     HC CAPSULE ENDOSCOPY N/A 10/2/2014    Procedure: CAPSULE/PILL CAM ENDOSCOPY;  Surgeon: Remy Haskins MD;   Location:  GI     ORTHOPEDIC SURGERY      broken wrist repair     SEX TRANSFORMATION SURGERY, MALE TO FEMALE      1974     SINUS SURGERY      cyst removed     TONSILLECTOMY       VASCULAR SURGERY      Left carotid stent       Family History   Problem Relation Age of Onset     Dementia Mother      Glaucoma Mother      DIABETES Mother      may have been type 1, childhood     Coronary Artery Disease Mother      MI     Glaucoma Father      DIABETES Father      may hev been type 1 - ??     Heart Failure Father      CANCER Maternal Aunt      leukemia     Schizophrenia Brother      Depression Brother      Suicide Sister      Depression Sister      DIABETES Sister      CANCER Maternal Aunt      ovarian     Glaucoma Maternal Grandmother      DIABETES Maternal Grandmother      Glaucoma Maternal Grandfather      DIABETES Maternal Grandfather      Glaucoma Paternal Grandmother      DIABETES Paternal Grandmother      Glaucoma Paternal Grandfather      DIABETES Paternal Grandfather      Breast Cancer Sister      CEREBROVASCULAR DISEASE Brother      Colon Cancer No family hx of        Social History   Substance Use Topics     Smoking status: Current Every Day Smoker     Packs/day: 2.50     Years: 30.00     Types: Cigarettes, Cigars     Last attempt to quit: 11/1/2000     Smokeless tobacco: Never Used      Comment: Smoking 3 cig per day with each meal.      Alcohol use No     Review of Systems   14 point review of symptoms was performed and is negative except as noted above.     Physical Exam   BP: 104/71  Heart Rate: 72  Temp: 97.8  F (36.6  C)  Resp: 20  SpO2: 97 %      Physical Exam  GEN: Well appearing, non toxic, cooperative and conversant.   HEENT: The head is normocephalic and atraumatic. Pupils are equal round and reactive to light. Extraocular motions are intact. OP clear.   CV: Regular rate and rhythm without murmurs rubs or gallops. 2+ radial pulses bilaterally.  PULM: Clear to auscultation bilaterally.  ABD: Soft,  nontender, nondistended. Normal bowel sounds.   EXT: Full range of motion.  No edema.  NEURO: Cranial nerves II through XII are intact and symmetric. Bilateral upper and lower extremities grossly show full range of motion without any focal deficits.   SKIN: No rashes, ecchymosis, or lacerations  PSYCH: Calm and cooperative, interactive.     ED Course     ED Course     Procedures               Labs Ordered and Resulted from Time of ED Arrival Up to the Time of Departure from the ED   CBC WITH PLATELETS DIFFERENTIAL - Abnormal; Notable for the following:        Result Value    Hemoglobin 11.5 (*)     RDW 16.2 (*)     All other components within normal limits   BASIC METABOLIC PANEL            Assessments & Plan (with Medical Decision Making)   67 F with food bolus impaction  Unable to tolerate po liquids, but is able to speak and breath without difficulty,   Tolerating her own secretions.     IV established.   Pt given IV ativan and glucagon to trial of food bolus passage.   GI consult called and aware.   If fails to pass following medical management maria del carmen need furthe intervention by GI.     Pt signed out to Dr. Dick    I have reviewed the nursing notes.    I have reviewed the findings, diagnosis, plan and need for follow up with the patient.    Discharge Medication List as of 11/27/2017 10:15 AM          Final diagnoses:   Foreign body in esophagus, initial encounter   Gastroesophageal reflux disease without esophagitis       11/26/2017   [facility], [place], EMERGENCY DEPARTMENT     Ky Marinelli MD  11/29/17 0202

## 2017-11-26 NOTE — ED NOTES
Bed: ED02  Expected date: 11/26/17  Expected time: 2:43 PM  Means of arrival: Ambulance  Comments:  N 013 67F c/o choking episode, something still feels stuck. yellow

## 2017-11-26 NOTE — IP AVS SNAPSHOT
Unit [address] Observation [address] [address]    Phone:  [phone]    Fax:  [phone]                                       After Visit Summary   11/26/2017    Sonya Foote    MRN: [id number]           After Visit Summary Signature Page     I have received my discharge instructions, and my questions have been answered. I have discussed any challenges I see with this plan with the nurse or doctor.    ..........................................................................................................................................  Patient/Patient Representative Signature      ..........................................................................................................................................  Patient Representative Print Name and Relationship to Patient    ..................................................               ................................................  Date                                            Time    ..........................................................................................................................................  Reviewed by Signature/Title    ...................................................              ..............................................  Date                                                            Time

## 2017-11-27 ENCOUNTER — CARE COORDINATION (OUTPATIENT)
Dept: GERIATRIC MEDICINE | Facility: CLINIC | Age: 67
End: 2017-11-27

## 2017-11-27 VITALS
TEMPERATURE: 98.1 F | OXYGEN SATURATION: 96 % | RESPIRATION RATE: 16 BRPM | DIASTOLIC BLOOD PRESSURE: 65 MMHG | SYSTOLIC BLOOD PRESSURE: 144 MMHG

## 2017-11-27 PROBLEM — T18.108A ESOPHAGEAL FOREIGN BODY: Status: ACTIVE | Noted: 2017-11-27

## 2017-11-27 LAB — UPPER GI ENDOSCOPY: NORMAL

## 2017-11-27 PROCEDURE — G0378 HOSPITAL OBSERVATION PER HR: HCPCS

## 2017-11-27 PROCEDURE — 99217 ZZC OBSERVATION CARE DISCHARGE: CPT | Mod: Z6 | Performed by: FAMILY MEDICINE

## 2017-11-27 PROCEDURE — 25000132 ZZH RX MED GY IP 250 OP 250 PS 637: Performed by: NURSE PRACTITIONER

## 2017-11-27 PROCEDURE — 25000128 H RX IP 250 OP 636: Performed by: NURSE PRACTITIONER

## 2017-11-27 RX ORDER — SODIUM CHLORIDE, SODIUM LACTATE, POTASSIUM CHLORIDE, CALCIUM CHLORIDE 600; 310; 30; 20 MG/100ML; MG/100ML; MG/100ML; MG/100ML
INJECTION, SOLUTION INTRAVENOUS CONTINUOUS
Status: DISCONTINUED | OUTPATIENT
Start: 2017-11-27 | End: 2017-11-27 | Stop reason: HOSPADM

## 2017-11-27 RX ORDER — METOPROLOL SUCCINATE 25 MG/1
25 TABLET, EXTENDED RELEASE ORAL DAILY
Status: DISCONTINUED | OUTPATIENT
Start: 2017-11-27 | End: 2017-11-27 | Stop reason: HOSPADM

## 2017-11-27 RX ORDER — ACETAMINOPHEN 500 MG
1000 TABLET ORAL EVERY 6 HOURS PRN
Status: DISCONTINUED | OUTPATIENT
Start: 2017-11-27 | End: 2017-11-27 | Stop reason: HOSPADM

## 2017-11-27 RX ORDER — ATORVASTATIN CALCIUM 40 MG/1
40 TABLET, FILM COATED ORAL AT BEDTIME
Status: DISCONTINUED | OUTPATIENT
Start: 2017-11-27 | End: 2017-11-27 | Stop reason: HOSPADM

## 2017-11-27 RX ORDER — LISINOPRIL 10 MG/1
10 TABLET ORAL DAILY
Status: DISCONTINUED | OUTPATIENT
Start: 2017-11-27 | End: 2017-11-27 | Stop reason: HOSPADM

## 2017-11-27 RX ORDER — ONDANSETRON 2 MG/ML
4 INJECTION INTRAMUSCULAR; INTRAVENOUS EVERY 6 HOURS PRN
Status: DISCONTINUED | OUTPATIENT
Start: 2017-11-26 | End: 2017-11-27 | Stop reason: HOSPADM

## 2017-11-27 RX ORDER — NALOXONE HYDROCHLORIDE 0.4 MG/ML
.1-.4 INJECTION, SOLUTION INTRAMUSCULAR; INTRAVENOUS; SUBCUTANEOUS
Status: DISCONTINUED | OUTPATIENT
Start: 2017-11-26 | End: 2017-11-27 | Stop reason: HOSPADM

## 2017-11-27 RX ORDER — ONDANSETRON 4 MG/1
4 TABLET, ORALLY DISINTEGRATING ORAL EVERY 6 HOURS PRN
Status: DISCONTINUED | OUTPATIENT
Start: 2017-11-26 | End: 2017-11-27 | Stop reason: HOSPADM

## 2017-11-27 RX ORDER — PHENYTOIN SODIUM 100 MG/1
200 CAPSULE, EXTENDED RELEASE ORAL 2 TIMES DAILY
Status: DISCONTINUED | OUTPATIENT
Start: 2017-11-27 | End: 2017-11-27 | Stop reason: HOSPADM

## 2017-11-27 RX ORDER — PREGABALIN 75 MG/1
150 CAPSULE ORAL 2 TIMES DAILY
Status: DISCONTINUED | OUTPATIENT
Start: 2017-11-27 | End: 2017-11-27 | Stop reason: HOSPADM

## 2017-11-27 RX ORDER — ESCITALOPRAM OXALATE 10 MG/1
10 TABLET ORAL DAILY
Status: DISCONTINUED | OUTPATIENT
Start: 2017-11-27 | End: 2017-11-27 | Stop reason: HOSPADM

## 2017-11-27 RX ORDER — SUCRALFATE 1 G/1
1 TABLET ORAL 4 TIMES DAILY
Status: DISCONTINUED | OUTPATIENT
Start: 2017-11-27 | End: 2017-11-27 | Stop reason: HOSPADM

## 2017-11-27 RX ORDER — LEVETIRACETAM 250 MG/1
500 TABLET ORAL 2 TIMES DAILY
Status: DISCONTINUED | OUTPATIENT
Start: 2017-11-27 | End: 2017-11-27 | Stop reason: HOSPADM

## 2017-11-27 RX ORDER — RISPERIDONE 0.5 MG/1
0.5 TABLET ORAL AT BEDTIME
Status: DISCONTINUED | OUTPATIENT
Start: 2017-11-27 | End: 2017-11-27 | Stop reason: HOSPADM

## 2017-11-27 RX ORDER — METFORMIN HCL 500 MG
500 TABLET, EXTENDED RELEASE 24 HR ORAL
Status: DISCONTINUED | OUTPATIENT
Start: 2017-11-27 | End: 2017-11-27 | Stop reason: HOSPADM

## 2017-11-27 RX ORDER — PANTOPRAZOLE SODIUM 40 MG/1
40 TABLET, DELAYED RELEASE ORAL 2 TIMES DAILY
Qty: 30 TABLET | Refills: 1 | Status: ON HOLD | OUTPATIENT
Start: 2017-11-27 | End: 2018-02-09

## 2017-11-27 RX ORDER — HYDROCHLOROTHIAZIDE 12.5 MG/1
12.5 CAPSULE ORAL DAILY
Status: DISCONTINUED | OUTPATIENT
Start: 2017-11-27 | End: 2017-11-27 | Stop reason: HOSPADM

## 2017-11-27 RX ORDER — ALBUTEROL SULFATE 0.83 MG/ML
2.5 SOLUTION RESPIRATORY (INHALATION) EVERY 6 HOURS PRN
Status: DISCONTINUED | OUTPATIENT
Start: 2017-11-27 | End: 2017-11-27 | Stop reason: HOSPADM

## 2017-11-27 RX ORDER — ALBUTEROL SULFATE 90 UG/1
2 AEROSOL, METERED RESPIRATORY (INHALATION) EVERY 6 HOURS PRN
Status: DISCONTINUED | OUTPATIENT
Start: 2017-11-27 | End: 2017-11-27 | Stop reason: HOSPADM

## 2017-11-27 RX ORDER — CYCLOBENZAPRINE HCL 5 MG
10 TABLET ORAL 2 TIMES DAILY PRN
Status: DISCONTINUED | OUTPATIENT
Start: 2017-11-27 | End: 2017-11-27 | Stop reason: HOSPADM

## 2017-11-27 RX ORDER — ESTRADIOL 1 MG/1
1 TABLET ORAL DAILY
Status: DISCONTINUED | OUTPATIENT
Start: 2017-11-27 | End: 2017-11-27 | Stop reason: HOSPADM

## 2017-11-27 RX ORDER — PANTOPRAZOLE SODIUM 40 MG/1
40 TABLET, DELAYED RELEASE ORAL DAILY
Status: DISCONTINUED | OUTPATIENT
Start: 2017-11-27 | End: 2017-11-27 | Stop reason: HOSPADM

## 2017-11-27 RX ADMIN — ESTRADIOL 1 MG: 1 TABLET ORAL at 08:16

## 2017-11-27 RX ADMIN — SODIUM CHLORIDE, POTASSIUM CHLORIDE, SODIUM LACTATE AND CALCIUM CHLORIDE: 600; 310; 30; 20 INJECTION, SOLUTION INTRAVENOUS at 00:09

## 2017-11-27 RX ADMIN — PREGABALIN 150 MG: 75 CAPSULE ORAL at 08:16

## 2017-11-27 RX ADMIN — METOPROLOL SUCCINATE 25 MG: 25 TABLET, EXTENDED RELEASE ORAL at 08:16

## 2017-11-27 RX ADMIN — LISINOPRIL 10 MG: 10 TABLET ORAL at 08:17

## 2017-11-27 RX ADMIN — LEVETIRACETAM 500 MG: 250 TABLET, FILM COATED ORAL at 08:16

## 2017-11-27 RX ADMIN — HYDROCHLOROTHIAZIDE 12.5 MG: 12.5 CAPSULE ORAL at 08:16

## 2017-11-27 RX ADMIN — PANTOPRAZOLE SODIUM 40 MG: 40 TABLET, DELAYED RELEASE ORAL at 08:16

## 2017-11-27 RX ADMIN — DICLOFENAC SODIUM 2 G: 10 GEL TOPICAL at 08:16

## 2017-11-27 RX ADMIN — RISPERIDONE 0.5 MG: 0.5 TABLET ORAL at 01:41

## 2017-11-27 RX ADMIN — SUCRALFATE 1 G: 1 TABLET ORAL at 08:16

## 2017-11-27 RX ADMIN — PHENYTOIN SODIUM 200 MG: 100 CAPSULE, EXTENDED RELEASE ORAL at 08:17

## 2017-11-27 RX ADMIN — ESCITALOPRAM OXALATE 10 MG: 10 TABLET ORAL at 08:16

## 2017-11-27 NOTE — CONSULTS
GASTROENTEROLOGY CONSULTATION      Date of Admission:  11/26/2017          ASSESSMENT AND RECOMMENDATIONS:   Assessment:  67 year old female with a history of seizures, CVA, type 2 diabetes, COPD who has a report of esophageal stricture in past with food impaction who presents with food impaction after eating turkey.      Recommendations  Failed EGD in ED.  Plan to repeat EGD emergently in OR for foreign body removal.  Will require airway protection.      Gastroenterology outpatient follow up recommendations: None    Thank you for involving us in this patient's care. Please do not hesitate to contact the GI service with any questions or concerns.     Herberth Castrejon MD  GI staff  [phone]  -------------------------------------------------------------------------------------------------------------------          Chief Complaint:   We were asked by Dr. Dick from the emergency department this patient with esophageal obstruction due to food impaction.      History is obtained from the patient and the medical record.     67 year old transgendered female with complex medical history including CVA, type 2 diabetes, COPD who has history of food impaction and potential prior esophageal stricture.  She is supposed to be on a soft diet, but had a turkey sandwich today.  She developed an impaction of the turkey while eating.  She did cough some up, but they symptoms did not resolve.      In the ED she was unable to swallow water, and while initially tolerated her secretions, stopped after some time in the ED. EGD attempted in the ED with large meat impaction in mid esophagus.  Unable to pass.  Attempted to remove with rothnet, but unable to get net around.           History of Present Illness:   Sonya Foote is a 67 year old female with a history of            Past Medical History:   Reviewed and edited as appropriate  Past Medical History:   Diagnosis Date     Anemia      Antiplatelet or antithrombotic long-term use       Arthritis      AS (sickle cell trait) (H) 10/8/2013     Blind left eye      BPPV (benign paroxysmal positional vertigo)      Chronic back pain      COPD (chronic obstructive pulmonary disease) (H)      Coronary artery disease      CVA (cerebral infarction) 10/8/2012    x3, residual left sided weakness     Diastolic CHF (H) 9/4/2012     Dilantin toxicity 5/31/2013     Esophageal candidiasis (H)      GERD (gastroesophageal reflux disease)      Heart attack      Hernia, abdominal      History of blood transfusion     x5     History of thrombophlebitis      HTN (hypertension) 9/4/2012     Hyperlipidemia LDL goal <100 9/4/2012     Legally blind in right eye, as defined in [place]     No periphal vision right eye     Osteoporosis 1/21/2013     Other chronic pain      PAD (peripheral artery disease) (H)      Palpitations      Person who has had sex change operation 1/20/2014     Pneumonia      Schizoaffective disorder (H)      Seizure disorder (H) 9/4/2012     Sleep apnea     CPAP     Thrombosis of leg      Type 2 diabetes, HbA1C goal < 8% (H) 9/4/2012     Uncomplicated asthma      Unspecified cerebral artery occlusion with cerebral infarction             Past Surgical History:   Reviewed and edited as appropriate   Past Surgical History:   Procedure Laterality Date     AMPUTATE LEG ABOVE KNEE Left 6/11/2016    Procedure: AMPUTATE LEG ABOVE KNEE;  Surgeon: Mello Rodriguez MD;  [facility]     AMPUTATE LEG BELOW KNEE Right 11/7/2016    Procedure: AMPUTATE LEG BELOW KNEE;  Surgeon: Savannah Durant MD;  [facility]     AMPUTATE REVISION STUMP LOWER EXTREMITY Right 11/11/2016    Procedure: AMPUTATE REVISION STUMP LOWER EXTREMITY;  Surgeon: Savannah Duratn MD;  [facility]     AMPUTATE REVISION STUMP LOWER EXTREMITY Right 11/16/2016    Procedure: AMPUTATE REVISION STUMP LOWER EXTREMITY;  Surgeon: Savannah Durant MD;  [facility]     AMPUTATE TOE(S) Right 1/5/2016    Procedure: AMPUTATE TOE(S);  Surgeon: Mello Gaines  DONOVAN Wing;  Location:  [facility]     ANGIOGRAM Bilateral 11/21/2014    Procedure: ANGIOGRAM;  Surgeon: Savannah Durant MD;  [facility]     ANGIOGRAM Left 1/16/2015    Procedure: ANGIOGRAM;  Surgeon: Savannah Durant MD;  [facility]     ANGIOGRAM Bilateral 9/14/2015    Procedure: ANGIOGRAM;  Surgeon: Savannah Durant MD;  [facility]     ANGIOGRAM Left 10/12/2015    Procedure: ANGIOGRAM;  Surgeon: Savannah uDrant MD;  [facility]     ANGIOGRAM Right 6/6/2016    Procedure: ANGIOGRAM;  Surgeon: Savannah Durant MD;  [facility]     ANGIOPLASTY Right 6/6/2016    Procedure: ANGIOPLASTY;  Surgeon: Savannah Durant MD;  [facility]     APPENDECTOMY       BREAST SURGERY      right breast bx (benign)     CHOLECYSTECTOMY       COLONOSCOPY N/A 8/25/2014    Procedure: COLONOSCOPY;  Surgeon: Mello Ferrer MD;  [facility]     COLONOSCOPY WITH CO2 INSUFFLATION N/A 8/20/2014    Procedure: COLONOSCOPY WITH CO2 INSUFFLATION;  Surgeon: Duane, William Charles, MD;  [facility]     ENDARTERECTOMY FEMORAL  5/23/2014    Procedure: ENDARTERECTOMY FEMORAL;  Surgeon: Jason Joshi MD;  [facility]     ESOPHAGOSCOPY, GASTROSCOPY, DUODENOSCOPY (EGD), COMBINED  12/14/2012    Procedure: COMBINED ESOPHAGOSCOPY, GASTROSCOPY, DUODENOSCOPY (EGD), BIOPSY SINGLE OR MULTIPLE;  ESOPHAGOSCOPY, GASTROSCOPY, DUODENOSCOPY (EGD), DILATATION ;  Surgeon: Elizabeth Stevenson MD;  Location:  GI     ESOPHAGOSCOPY, GASTROSCOPY, DUODENOSCOPY (EGD), COMBINED  12/31/2013    Procedure: COMBINED ESOPHAGOSCOPY, GASTROSCOPY, DUODENOSCOPY (EGD), BIOPSY SINGLE OR MULTIPLE;;  Surgeon: Clemente Lopez MD;  Location:  GI     ESOPHAGOSCOPY, GASTROSCOPY, DUODENOSCOPY (EGD), COMBINED  4/1/2014    Procedure: COMBINED ESOPHAGOSCOPY, GASTROSCOPY, DUODENOSCOPY (EGD);;  Surgeon: Clemente Lopez MD;  Location:  GI     ESOPHAGOSCOPY, GASTROSCOPY, DUODENOSCOPY (EGD), COMBINED  6/28/2014    Procedure: COMBINED ESOPHAGOSCOPY, GASTROSCOPY, DUODENOSCOPY  (EGD);  Surgeon: Clemente Lopez MD;  [facility]     ESOPHAGOSCOPY, GASTROSCOPY, DUODENOSCOPY (EGD), COMBINED N/A 8/20/2014    Procedure: COMBINED ESOPHAGOSCOPY, GASTROSCOPY, DUODENOSCOPY (EGD), BIOPSY SINGLE OR MULTIPLE;  Surgeon: Duane, William Charles, MD;  [facility]     ESOPHAGOSCOPY, GASTROSCOPY, DUODENOSCOPY (EGD), COMBINED N/A 8/22/2014    Procedure: COMBINED ESOPHAGOSCOPY, GASTROSCOPY, DUODENOSCOPY (EGD), BIOPSY SINGLE OR MULTIPLE;  Surgeon: Mello Ferrer MD;  [facility]     ESOPHAGOSCOPY, GASTROSCOPY, DUODENOSCOPY (EGD), COMBINED N/A 10/2/2014    Procedure: COMBINED ESOPHAGOSCOPY, GASTROSCOPY, DUODENOSCOPY (EGD), BIOPSY SINGLE OR MULTIPLE;  Surgeon: Remy Haskins MD;  [facility]     ESOPHAGOSCOPY, GASTROSCOPY, DUODENOSCOPY (EGD), COMBINED Left 12/15/2014    Procedure: COMBINED ESOPHAGOSCOPY, GASTROSCOPY, DUODENOSCOPY (EGD), BIOPSY SINGLE OR MULTIPLE;  Surgeon: Remy Haskins MD;  [facility]     ESOPHAGOSCOPY, GASTROSCOPY, DUODENOSCOPY (EGD), COMBINED N/A 2/25/2015    Procedure: COMBINED ENDOSCOPIC ULTRASOUND, ESOPHAGOSCOPY, GASTROSCOPY, DUODENOSCOPY (EGD), FINE NEEDLE ASPIRATE/BIOPSY;  Surgeon: Clemente Lugo MD;  [facility]     ESOPHAGOSCOPY, GASTROSCOPY, DUODENOSCOPY (EGD), COMBINED Left 2/25/2015    Procedure: COMBINED ESOPHAGOSCOPY, GASTROSCOPY, DUODENOSCOPY (EGD), BIOPSY SINGLE OR MULTIPLE;  Surgeon: Clemente Lugo MD;  [facility]     ESOPHAGOSCOPY, GASTROSCOPY, DUODENOSCOPY (EGD), COMBINED N/A 9/25/2016    Procedure: COMBINED ESOPHAGOSCOPY, GASTROSCOPY, DUODENOSCOPY (EGD);  Surgeon: Aziza Patiño MD;  [facility]     ESOPHAGOSCOPY, GASTROSCOPY, DUODENOSCOPY (EGD), COMBINED N/A 1/18/2017    Procedure: COMBINED ESOPHAGOSCOPY, GASTROSCOPY, DUODENOSCOPY (EGD), BIOPSY SINGLE OR MULTIPLE;  Surgeon: Clemente Lopez MD;  [facility]     FASCIOTOMY LOWER EXTREMITY Left 6/10/2016    Procedure: FASCIOTOMY LOWER EXTREMITY;  Surgeon: Michael  Mello Brooks MD;  [facility]     HC CAPSULE ENDOSCOPY N/A 8/25/2014    Procedure: CAPSULE/PILL CAM ENDOSCOPY;  Surgeon: Remy Haskins MD;  [facility]     HC CAPSULE ENDOSCOPY N/A 10/2/2014    Procedure: CAPSULE/PILL CAM ENDOSCOPY;  Surgeon: Remy Haskins MD;  [facility]     ORTHOPEDIC SURGERY      broken wrist repair     SEX TRANSFORMATION SURGERY, MALE TO FEMALE      1974     SINUS SURGERY      cyst removed     TONSILLECTOMY       VASCULAR SURGERY      Left carotid stent            Previous Endoscopy:   No results found. However, due to the size of the patient record, not all encounters were searched. Please check Results Review for a complete set of results.         Social History:   Reviewed and edited as appropriate  Social History     Social History     Marital status:      Spouse name: Jayde     Number of children: 2     Years of education: N/A     Occupational History     mental health worker      retired     Social History Main Topics     Smoking status: Current Every Day Smoker     Packs/day: 2.50     Years: 30.00     Types: Cigarettes, Cigars     Last attempt to quit: 11/1/2000     Smokeless tobacco: Never Used      Comment: Smoking 3 cig per day with each meal.      Alcohol use No     Drug use: No     Sexual activity: Not Currently     Other Topics Concern     Caffeine Concern No     1 in the morning     Social History Narrative      Her father was in the  and she moved around a lot when she was a kid, and has lived abroad including in [facility] and the [facility].  She was previously employed as a mental health worker. Moved from [place] to [place] in 2012. She is currently living in assisted living ([facility] in [facility]).  Wife passed away 6/2017.            She has 2 adopted children (Kam and Prashanth)            Family History:   Reviewed and edited as appropriate  Family History   Problem Relation Age of Onset     Dementia Mother       Glaucoma Mother      DIABETES Mother      may have been type 1, childhood     Coronary Artery Disease Mother      MI     Glaucoma Father      DIABETES Father      may hev been type 1 - ??     Heart Failure Father      CANCER Maternal Aunt      leukemia     Schizophrenia Brother      Depression Brother      Suicide Sister      Depression Sister      DIABETES Sister      CANCER Maternal Aunt      ovarian     Glaucoma Maternal Grandmother      DIABETES Maternal Grandmother      Glaucoma Maternal Grandfather      DIABETES Maternal Grandfather      Glaucoma Paternal Grandmother      DIABETES Paternal Grandmother      Glaucoma Paternal Grandfather      DIABETES Paternal Grandfather      Breast Cancer Sister      CEREBROVASCULAR DISEASE Brother      Colon Cancer No family hx of             Allergies:   Reviewed and edited as appropriate     Allergies   Allergen Reactions     Bee Venom      Penicillins Anaphylaxis     Other reaction(s): Skin Rash and/or Hives     Dilantin [Phenytoin] Other (See Comments)     Generic dilantin only per pt     Iodide Hives     09/11/15: Pt states she can use iodine and doesn't have any problems      Iodine-131      Novocaine [Procaine] Hives     Other reaction(s): Skin Rash and/or Hives     Tositumomab             Medications:     Current Facility-Administered Medications   Medication     simethicone (MYLICON) suspension     Current Outpatient Prescriptions   Medication Sig     metFORMIN (GLUCOPHAGE-XR) 500 MG 24 hr tablet Take 1 tablet (500 mg) by mouth daily (with dinner)     brimonidine (ALPHAGAN) 0.2 % ophthalmic solution Place 1 drop into the right eye 3 times daily (Patient taking differently: Place 1 drop into the right eye 2 times daily )     dorzolamide-timolol (COSOPT) 2-0.5 % ophthalmic solution Place 1 drop into the right eye 2 times daily     sucralfate (CARAFATE) 1 GM tablet Take 1 tablet (1 g) by mouth 4 times daily May dissolve in 10 mL water is necessary. (Start upon  completion of carafate suspension)     escitalopram (LEXAPRO) 5 MG tablet Take 2 tablets (10 mg) by mouth daily     albuterol (PROAIR HFA/PROVENTIL HFA/VENTOLIN HFA) 108 (90 BASE) MCG/ACT Inhaler Inhale 2 puffs into the lungs every 6 hours as needed for shortness of breath / dyspnea or wheezing     umeclidinium (INCRUSE ELLIPTA) 62.5 MCG/INH oral inhaler Inhale 1 puff into the lungs daily     ONDANSETRON PO Take 4 mg by mouth 3 times daily     cyclobenzaprine (FLEXERIL) 10 MG tablet Take 1 tablet (10 mg) by mouth 2 times daily as needed for muscle spasms     lidocaine (LIDODERM) 5 % Patch APPLY 1 TO 3 PATCHES TO PAINFUL AREA AT ONCE FOR UP TO 12 HOURS WITHIN A 24 HOUR PERIOD REMOVE AFTER 12 HOURS     blood glucose monitoring (ONE TOUCH ULTRASOFT) lancets Use to test blood sugar 3 times daily or as directed.     blood glucose monitoring (ONE TOUCH ULTRA) test strip Use to test blood sugars 3 times daily or as directed.     metoprolol (TOPROL-XL) 25 MG 24 hr tablet TAKE 1 TABLET (25 MG) BY MOUTH DAILY     traZODone (DESYREL) 100 MG tablet Take 1.5 tablets (150 mg) by mouth At Bedtime     order for [facility] Full electric hospital bed with half rails    Dx: D21783, I110, J449  Length of need: lifetime     order for [facility] Wheel Chair Cushion: 18 x 18 inch Roho cushion     order for [facility] Hospital bed with side rails     polyethylene glycol (MIRALAX/GLYCOLAX) Packet Take 17 g by mouth daily as needed Dissolved in water or juice      ACETAMINOPHEN PO Take 1,000 mg by mouth every 6 hours as needed for fever Taking q4-6 hours, per MAR     pregabalin (LYRICA) 75 MG capsule Take 2 capsules (150 mg) by mouth 2 times daily     cetirizine (ZYRTEC) 10 MG tablet Take 1 tablet (10 mg) by mouth daily as needed for allergies     diclofenac (VOLTAREN) 1 % GEL topical gel Apply 2 g topically 4 times daily to hands     EPINEPHrine (EPIPEN JR) 0.15 MG/0.3ML injection Inject 0.3 mLs (0.15 mg) into the muscle as needed for anaphylaxis     lisinopril  (PRINIVIL/ZESTRIL) 10 MG tablet Take 1 tablet (10 mg) by mouth daily     pantoprazole (PROTONIX) 40 MG EC tablet Take 40 mg by mouth daily     risperiDONE (RISPERDAL) 0.5 MG tablet Take 0.5 mg by mouth At Bedtime     ferrous sulfate (IRON) 325 (65 FE) MG tablet Take 1 tablet (325 mg) by mouth 2 times daily With meals     order for DME Equipment being ordered: CPAP supplies mask, hose, filters, cushion    fax to [facility] at [phone]     order for DME Equipment being ordered: CPAP supplies mask, hose, filters, cushion fax to [facility] at [phone]  Disp: 10 Device Refills: prn   Class: Local Print Start: 2/10/2017     order for DME Equipment being ordered: Nebulizer and tubing supplies     albuterol (2.5 MG/3ML) 0.083% neb solution INHALE 1 VIAL VIA NEBULIZER EVERY 6 HOURS AS NEEDED     CYANOCOBALAMIN PO Take 2,000 mcg by mouth daily     Nutritional Supplements (RENÉE) PACK Take 1 packet by mouth 2 times daily     fluticasone (FLONASE) 50 MCG/ACT nasal spray Spray 1 spray into both nostrils daily     ADVAIR DISKUS 250-50 MCG/DOSE diskus inhaler Inhale 1 puff into the lungs 2 times daily      calcium citrate-vitamin D (CITRACAL) 315-250 MG-UNIT TABS Take 2 tablets by mouth daily     hydrochlorothiazide (MICROZIDE) 12.5 MG capsule Take 1 capsule (12.5 mg) by mouth daily (Patient taking differently: Take 12.5 mg by mouth daily Hold for sbp<90)     estradiol (ESTRACE) 1 MG tablet Take 1 tablet (1 mg) by mouth daily     levETIRAcetam (KEPPRA) 500 MG tablet Take 1 tablet (500 mg) by mouth 2 times daily     aspirin EC 81 MG EC tablet Take 1 tablet (81 mg) by mouth daily (Patient taking differently: Take 81 mg by mouth every morning )     blood glucose monitoring (ONE TOUCH ULTRA 2) meter device kit Use to test blood sugars 3 times daily or as directed.     phenytoin 200 MG CAPS Take 200 mg by mouth 2 times daily (Patient taking differently: Take 200 mg by mouth 2 times daily (takes at 8 AM and 8 PM))      dorzolamide (TRUSOPT) 2 % ophthalmic solution Place 1 drop into both eyes 2 times daily      zinc 50 MG TABS Take 1 tablet by mouth daily     nitroglycerin (NITROSTAT) 0.4 MG SL tablet Place 1 tablet (0.4 mg) under the tongue every 5 minutes as needed for chest pain if you are still having symptoms after 3 doses (15 minutes) call 911.     MEDICATION GIVEN BY INTRATHECAL PUMP - INSTRUCTION continuous May 19, 2017 - per [facility] in [place] [phone]:  Conc: Bupivacaine 20 mg/mL and Fentanyl 2000 mcg/mL.  Continuous: Bupivacaine 7.265 mg/day and Fentanyl 726.5 mcg/day.  Boluses: Up to 7 boluses per 24-hr period of Bupivicaine 0.599 mg and Fentanyl 59.9 mcg    Pump Refill Date at [facility]: 7/2/17     latanoprost (XALATAN) 0.005 % ophthalmic solution Place 1 drop into both eyes At Bedtime     atorvastatin (LIPITOR) 40 MG tablet Take 1 tablet (40 mg) by mouth daily (Patient taking differently: Take 40 mg by mouth At Bedtime )     order for DME Equipment being ordered: Glucerna daily shakes with each meal     prochlorperazine (COMPAZINE) 10 MG tablet Take 1 tablet (10 mg) by mouth every 8 hours as needed     ORDER FOR DME Equipment being ordered: Power Wheelchair     ORDER FOR DME Equipment being ordered: Depends briefs     Cholecalciferol (VITAMIN D) 2000 UNITS tablet Take 2,000 Units by mouth daily.     Multiple Vitamin (MULTIVITAMIN OR) Take 1 tablet by mouth daily      Facility-Administered Medications Ordered in Other Encounters   Medication     neostigmine (PROSTIGMINE) injection     glycopyrrolate (ROBINUL) injection             Review of Systems:   A complete review of systems was performed and is negative except as noted in the HPI           Physical Exam:   /64  Temp 97.8  F (36.6  C) (Oral)  Resp 14  SpO2 100%  Wt:   Wt Readings from Last 2 Encounters:   11/06/17 60.3 kg (133 lb)   10/23/17 61.2 kg (135 lb)      Constitutional: cooperative, pleasant, not  dyspneic/diaphoretic, no acute distress  Eyes: Sclera anicteric/injected  Ears/nose/mouth/throat: Normal oropharynx without ulcers or exudate, mucus membranes moist, hearing intact, spitting up saliva   Neck: supple, thyroid normal size  CV: No edema  Respiratory: Unlabored breathing  Lymph: No axillary, submandibular, supraclavicular or inguinal lymphadenopathy  Abd: Nondistended, +bs, no hepatosplenomegaly, nontender, no peritoneal signs  Skin: warm, perfused, no jaundice  Neuro: AAO x 3, No asterixis  Psych: Normal affect  MSK: b/l lower extremity amputations.          Data:   Labs and imaging below were independently reviewed and interpreted    BMP  Recent Labs  Lab 11/26/17  1618      POTASSIUM 3.8   CHLORIDE 108   CAROL 9.2   CO2 28   BUN 9   CR 0.59   GLC 76     CBC  Recent Labs  Lab 11/26/17  1618   WBC 10.0   RBC 4.28   HGB 11.5*   HCT 36.5   MCV 85   MCH 26.9   MCHC 31.5   RDW 16.2*        INRNo lab results found in last 7 days.  LFTsNo lab results found in last 7 days.   PANCNo lab results found in last 7 days.    Imaging:  None

## 2017-11-27 NOTE — PROGRESS NOTES
Patient presents from [facility] following removal of esophageal food impaction. She developed difficulty swallowing after eating a turkey sandwich this morning. She had foreign body sensation, retching and inability to swallow liquids or pills. She has a history of prior food impactions due to GERD and stricture with last about 1 year ago. Attempt to clear the impaction in the ED was unsuccessful with glucagon and it was not possible to push through with endoscope, therefore she was taken to the OR for removal under anesthesia with airway protection. In the OR GI was successful partially removing the meat with forceps then push through the remaining bolus. She was found to have distal stricture near the GE junction. She has multiple co-morbidities including seizure disorder, CVA, blindness, DM2, COPD, schizoaffective disorder, CHF and was admitted to the ED observation unit for observation following the procedure. On arrival on the unit she is moderately sedated and lethargic. She denies chest pain, breathing difficulty of abdominal pain.    PAST MEDICAL HISTORY:   Past Medical History:   Diagnosis Date     Anemia      Antiplatelet or antithrombotic long-term use      Arthritis      AS (sickle cell trait) (H) 10/8/2013     Blind left eye      BPPV (benign paroxysmal positional vertigo)      Chronic back pain      COPD (chronic obstructive pulmonary disease) (H)      Coronary artery disease      CVA (cerebral infarction) 10/8/2012    x3, residual left sided weakness     Diastolic CHF (H) 9/4/2012     Dilantin toxicity 5/31/2013     Esophageal candidiasis (H)      GERD (gastroesophageal reflux disease)      Heart attack      Hernia, abdominal      History of blood transfusion     x5     History of thrombophlebitis      HTN (hypertension) 9/4/2012     Hyperlipidemia LDL goal <100 9/4/2012     Legally blind in right eye, as defined in [place]     No periphal vision right eye     Osteoporosis 1/21/2013     Other chronic pain       PAD (peripheral artery disease) (H)      Palpitations      Person who has had sex change operation 1/20/2014     Pneumonia      Schizoaffective disorder (H)      Seizure disorder (H) 9/4/2012     Sleep apnea     CPAP     Thrombosis of leg      Type 2 diabetes, HbA1C goal < 8% (H) 9/4/2012     Uncomplicated asthma      Unspecified cerebral artery occlusion with cerebral infarction        PAST SURGICAL HISTORY:   Past Surgical History:   Procedure Laterality Date     AMPUTATE LEG ABOVE KNEE Left 6/11/2016    Procedure: AMPUTATE LEG ABOVE KNEE;  Surgeon: Mello Rodriguez MD;  [facility]     AMPUTATE LEG BELOW KNEE Right 11/7/2016    Procedure: AMPUTATE LEG BELOW KNEE;  Surgeon: Savannah Durant MD;  [facility]     AMPUTATE REVISION STUMP LOWER EXTREMITY Right 11/11/2016    Procedure: AMPUTATE REVISION STUMP LOWER EXTREMITY;  Surgeon: Savannah Durant MD;  [facility]     AMPUTATE REVISION STUMP LOWER EXTREMITY Right 11/16/2016    Procedure: AMPUTATE REVISION STUMP LOWER EXTREMITY;  Surgeon: Savannah Durant MD;  [facility]     AMPUTATE TOE(S) Right 1/5/2016    Procedure: AMPUTATE TOE(S);  Surgeon: Mello Gaines DPM;  Location: [facility]     ANGIOGRAM Bilateral 11/21/2014    Procedure: ANGIOGRAM;  Surgeon: Savannah Durant MD;  [facility]     ANGIOGRAM Left 1/16/2015    Procedure: ANGIOGRAM;  Surgeon: Savannah Durant MD;  [facility]     ANGIOGRAM Bilateral 9/14/2015    Procedure: ANGIOGRAM;  Surgeon: Savannah Durant MD;  [facility]     ANGIOGRAM Left 10/12/2015    Procedure: ANGIOGRAM;  Surgeon: Savannah Durant MD;  [facility]     ANGIOGRAM Right 6/6/2016    Procedure: ANGIOGRAM;  Surgeon: Savannah Durant MD;  [facility]     ANGIOPLASTY Right 6/6/2016    Procedure: ANGIOPLASTY;  Surgeon: Savannah Durant MD;  [facility]     APPENDECTOMY       BREAST SURGERY      right breast bx (benign)     CHOLECYSTECTOMY       COLONOSCOPY N/A 8/25/2014    Procedure: COLONOSCOPY;  Surgeon: Mello Ferrer  MD RICHARD;  [facility]     COLONOSCOPY WITH CO2 INSUFFLATION N/A 8/20/2014    Procedure: COLONOSCOPY WITH CO2 INSUFFLATION;  Surgeon: Duane, William Charles, MD;  Location: [facility]     ENDARTERECTOMY FEMORAL  5/23/2014    Procedure: ENDARTERECTOMY FEMORAL;  Surgeon: Jason Joshi MD;  [facility]     ESOPHAGOSCOPY, GASTROSCOPY, DUODENOSCOPY (EGD), COMBINED  12/14/2012    Procedure: COMBINED ESOPHAGOSCOPY, GASTROSCOPY, DUODENOSCOPY (EGD), BIOPSY SINGLE OR MULTIPLE;  ESOPHAGOSCOPY, GASTROSCOPY, DUODENOSCOPY (EGD), DILATATION ;  Surgeon: Elizabeth Stevenson MD;  Location:  GI     ESOPHAGOSCOPY, GASTROSCOPY, DUODENOSCOPY (EGD), COMBINED  12/31/2013    Procedure: COMBINED ESOPHAGOSCOPY, GASTROSCOPY, DUODENOSCOPY (EGD), BIOPSY SINGLE OR MULTIPLE;;  Surgeon: Clemente Lopez MD;  [facility]     ESOPHAGOSCOPY, GASTROSCOPY, DUODENOSCOPY (EGD), COMBINED  4/1/2014    Procedure: COMBINED ESOPHAGOSCOPY, GASTROSCOPY, DUODENOSCOPY (EGD);;  Surgeon: Clemente Lopez MD;  [facility]     ESOPHAGOSCOPY, GASTROSCOPY, DUODENOSCOPY (EGD), COMBINED  6/28/2014    Procedure: COMBINED ESOPHAGOSCOPY, GASTROSCOPY, DUODENOSCOPY (EGD);  Surgeon: Clemente Lopez MD;  [facility]     ESOPHAGOSCOPY, GASTROSCOPY, DUODENOSCOPY (EGD), COMBINED N/A 8/20/2014    Procedure: COMBINED ESOPHAGOSCOPY, GASTROSCOPY, DUODENOSCOPY (EGD), BIOPSY SINGLE OR MULTIPLE;  Surgeon: Duane, William Charles, MD;  Location:  OR     ESOPHAGOSCOPY, GASTROSCOPY, DUODENOSCOPY (EGD), COMBINED N/A 8/22/2014    Procedure: COMBINED ESOPHAGOSCOPY, GASTROSCOPY, DUODENOSCOPY (EGD), BIOPSY SINGLE OR MULTIPLE;  Surgeon: Mello Ferrer MD;  [facility]     ESOPHAGOSCOPY, GASTROSCOPY, DUODENOSCOPY (EGD), COMBINED N/A 10/2/2014    Procedure: COMBINED ESOPHAGOSCOPY, GASTROSCOPY, DUODENOSCOPY (EGD), BIOPSY SINGLE OR MULTIPLE;  Surgeon: Remy Haskins MD;  Location:  GI     ESOPHAGOSCOPY, GASTROSCOPY, DUODENOSCOPY (EGD), COMBINED Left 12/15/2014     Procedure: COMBINED ESOPHAGOSCOPY, GASTROSCOPY, DUODENOSCOPY (EGD), BIOPSY SINGLE OR MULTIPLE;  Surgeon: Remy Haskins MD;  [facility]     ESOPHAGOSCOPY, GASTROSCOPY, DUODENOSCOPY (EGD), COMBINED N/A 2/25/2015    Procedure: COMBINED ENDOSCOPIC ULTRASOUND, ESOPHAGOSCOPY, GASTROSCOPY, DUODENOSCOPY (EGD), FINE NEEDLE ASPIRATE/BIOPSY;  Surgeon: Clemente Lugo MD;  [facility]     ESOPHAGOSCOPY, GASTROSCOPY, DUODENOSCOPY (EGD), COMBINED Left 2/25/2015    Procedure: COMBINED ESOPHAGOSCOPY, GASTROSCOPY, DUODENOSCOPY (EGD), BIOPSY SINGLE OR MULTIPLE;  Surgeon: Clemente Lugo MD;  [facility]     ESOPHAGOSCOPY, GASTROSCOPY, DUODENOSCOPY (EGD), COMBINED N/A 9/25/2016    Procedure: COMBINED ESOPHAGOSCOPY, GASTROSCOPY, DUODENOSCOPY (EGD);  Surgeon: Aziza Patiño MD;  [facility]     ESOPHAGOSCOPY, GASTROSCOPY, DUODENOSCOPY (EGD), COMBINED N/A 1/18/2017    Procedure: COMBINED ESOPHAGOSCOPY, GASTROSCOPY, DUODENOSCOPY (EGD), BIOPSY SINGLE OR MULTIPLE;  Surgeon: Clemente Lopez MD;  [facility]     FASCIOTOMY LOWER EXTREMITY Left 6/10/2016    Procedure: FASCIOTOMY LOWER EXTREMITY;  Surgeon: Mello Rodriguez MD;  [facility]     HC CAPSULE ENDOSCOPY N/A 8/25/2014    Procedure: CAPSULE/PILL CAM ENDOSCOPY;  Surgeon: Remy Haskins MD;  [facility]     HC CAPSULE ENDOSCOPY N/A 10/2/2014    Procedure: CAPSULE/PILL CAM ENDOSCOPY;  Surgeon: Remy Haskins MD;  [facility]     ORTHOPEDIC SURGERY      broken wrist repair     SEX TRANSFORMATION SURGERY, MALE TO FEMALE      1974     SINUS SURGERY      cyst removed     TONSILLECTOMY       VASCULAR SURGERY      Left carotid stent       FAMILY HISTORY:   Family History   Problem Relation Age of Onset     Dementia Mother      Glaucoma Mother      DIABETES Mother      may have been type 1, childhood     Coronary Artery Disease Mother      MI     Glaucoma Father      DIABETES Father      may hev been type 1 - ??     Heart  Failure Father      CANCER Maternal Aunt      leukemia     Schizophrenia Brother      Depression Brother      Suicide Sister      Depression Sister      DIABETES Sister      CANCER Maternal Aunt      ovarian     Glaucoma Maternal Grandmother      DIABETES Maternal Grandmother      Glaucoma Maternal Grandfather      DIABETES Maternal Grandfather      Glaucoma Paternal Grandmother      DIABETES Paternal Grandmother      Glaucoma Paternal Grandfather      DIABETES Paternal Grandfather      Breast Cancer Sister      CEREBROVASCULAR DISEASE Brother      Colon Cancer No family hx of        SOCIAL HISTORY:   Social History   Substance Use Topics     Smoking status: Current Every Day Smoker     Packs/day: 2.50     Years: 30.00     Types: Cigarettes, Cigars     Last attempt to quit: 11/1/2000     Smokeless tobacco: Never Used      Comment: Smoking 3 cig per day with each meal.      Alcohol use No     Physical Exam:  Elderly transgender female in NAD.  /59 (BP Location: Left arm)  Temp 99.3  F (37.4  C) (Oral)  Resp 16  SpO2 94%   Oral mucosa dry.  Neck: No stridor.  Lungs: Clear to auscultation.  Cardiac: RRR. Normal S1 and S2.  Abdomen: Soft, non tender.    Labs/Imaging    Results for orders placed or performed during the hospital encounter of 11/26/17 (from the past 24 hour(s))   CBC with platelets differential   Result Value Ref Range    WBC 10.0 4.0 - 11.0 10e9/L    RBC Count 4.28 3.8 - 5.2 10e12/L    Hemoglobin 11.5 (L) 11.7 - 15.7 g/dL    Hematocrit 36.5 35.0 - 47.0 %    MCV 85 78 - 100 fl    MCH 26.9 26.5 - 33.0 pg    MCHC 31.5 31.5 - 36.5 g/dL    RDW 16.2 (H) 10.0 - 15.0 %    Platelet Count 288 150 - 450 10e9/L    Diff Method Automated Method     % Neutrophils 68.9 %    % Lymphocytes 19.6 %    % Monocytes 9.4 %    % Eosinophils 1.5 %    % Basophils 0.4 %    % Immature Granulocytes 0.2 %    Nucleated RBCs 0 0 /100    Absolute Neutrophil 6.9 1.6 - 8.3 10e9/L    Absolute Lymphocytes 2.0 0.8 - 5.3 10e9/L     Absolute Monocytes 0.9 0.0 - 1.3 10e9/L    Absolute Eosinophils 0.2 0.0 - 0.7 10e9/L    Absolute Basophils 0.0 0.0 - 0.2 10e9/L    Abs Immature Granulocytes 0.0 0 - 0.4 10e9/L    Absolute Nucleated RBC 0.0    Basic metabolic panel   Result Value Ref Range    Sodium 143 133 - 144 mmol/L    Potassium 3.8 3.4 - 5.3 mmol/L    Chloride 108 94 - 109 mmol/L    Carbon Dioxide 28 20 - 32 mmol/L    Anion Gap 7 3 - 14 mmol/L    Glucose 76 70 - 99 mg/dL    Urea Nitrogen 9 7 - 30 mg/dL    Creatinine 0.59 0.52 - 1.04 mg/dL    GFR Estimate >90 >60 mL/min/1.7m2    GFR Estimate If Black >90 >60 mL/min/1.7m2    Calcium 9.2 8.5 - 10.1 mg/dL   UPPER GI ENDOSCOPY   Result Value Ref Range    Upper GI Endoscopy       [address] [place] [address] [phone]     Endoscopy Department  _______________________________________________________________________________  Patient Name: Sonya Foote              Procedure Date: 11/26/2017 6:23 PM  MRN: [id number]                       Account Number: [id number]  YOB: 1950              Admit Type: Inpatient  Age: 67                                Gender: Female  Note Status: Finalized                Attending MD: Herberth Castrejon ,   Pause for the Cause: pause for cause completed Total Sedation Time: 15 min of   face to face sedation time  _______________________________________________________________________________     Procedure:           Upper GI endoscopy  Indications:         Foreign body in the esophagus  Providers:           Herberth Castrejon, Mello Styles MD, Ernestine Dominguez,                        RN  Patient Profile:     presumed turkey impaction in esophagus.  Referring MD:          Medici leander:           Midazolam 3.5 mg IV, Fentanyl 75 micrograms IV  Complications:       No immediate complications.  _______________________________________________________________________________  Procedure:           Pre-Anesthesia Assessment:                        - Prior to the procedure, a History and Physical was                        performed, and patient medications and allergies were                        reviewed. The patient is competent. The risks and                        benefits of the procedure and the sedation options and                        risks were discussed with the patient. All questions                        were answered and informed consent was obtained. Patient                        identification and proposed procedure were verified by                        the physician and the nurse in the pre-procedure area in                        the procedure room. Mental Status Examination: alert and                        oriented. Airway Examination: normal o ropharyngeal                        airway and neck mobility. Respiratory Examination: clear                        to auscultation. CV Examination: normal. Prophylactic                        Antibiotics: The patient does not require prophylactic                        antibiotics. Prior Anticoagulants: The patient has taken                        no previous anticoagulant or antiplatelet agents. ASA                        Grade Assessment: III - A patient with severe systemic                        disease. After reviewing the risks and benefits, the                        patient was deemed in satisfactory condition to undergo                        the procedure. The anesthesia plan was to use moderate                        sedation / analgesia (conscious sedation). Immediately                        prior to administration of medications, the patient was                        re-assessed for adequacy to receive sedatives. The heart                        rate, respiratory rate,  oxygen saturations, blood                        pressure, adequacy of pulmonary ventilation, and                        response to care were monitored throughout the                         procedure. The physical status of the patient was                        re-assessed after the procedure.                       - Airway Examination: Mallampati Class IV (tongue                        obstructs view of the soft palate).                       After obtaining informed consent, the endoscope was                        passed under direct vision. Throughout the procedure,                        the patient's blood pressure, pulse, and oxygen                        saturations were monitored continuously. The Endoscope                        was introduced through the mouth, with the intention of                        advancing to the duodenum. The scope was advanced to the                        middle third of the esophagus before the procedure was                        aborted. Med ications were given. The procedure was                        aborted due to presence of food. The upper GI endoscopy                        was accomplished without difficulty. The patient                        tolerated the procedure fairly well.                                                                                   Findings:       Food was found in the middle third of the esophagus. Biopsies forceps        were used to attempt to dislodge the food enough to pass. A rothnet was        attempted but could not encompass bolus.                                                                                   Impression:          - The procedure was aborted due to presence of food.                       - Food in the middle third of the esophagus. Unable to                        be removed.  Recommendation:      - Transport patient to OR for intubation for airway                        protection                       - Repeat EGD in the OR.                                                                                      Electronically signed by: Herberth Castrejon MD  _____________________  Herberth Castrejon    11/26/2017 8:05:27 PM  I was physically present for the entire viewing portion of the exam.  __________________________  Signature of teaching physician  [id number] Castrejon    _____________________  Mello Styles MD  Number of Addenda: 0    Note Initiated On: 11/26/2017 6:23 PM  Scope In:  Scope Out:      Narrative    Mello Styles MD     11/26/2017 10:19 PM  EGD performed.     Findings  Large food bolus (chicken meat) found in the mid esophagus.   Enough was removed by gallego net/forceps/snare to allow endoscope   to push rest of the food bolus into stomach.   Stricture in at GE junction, likely 2/2 to reflux. Scope passed.  Moderate size hiatal hernia.   Stomach had few small polyps, benign appearing.   Duodenum normal.     Recommendations  - BID PPi  - Clear liquid diet, may advance to strict soft diet as   tolerated.   - Anti reflux life style.   - Repeat EGD in 3-4 weeks for dilation of esophageal stricture   (GI will coordinate).    Mello Styles MD   GI Fellow         Glucose by meter   Result Value Ref Range    Glucose 70 70 - 99 mg/dL   Glucose by meter   Result Value Ref Range    Glucose 165 (H) 70 - 99 mg/dL   Glucose by meter   Result Value Ref Range    Glucose 139 (H) 70 - 99 mg/dL     *Note: Due to a large number of results and/or encounters for the requested time period, some results have not been displayed. A complete set of results can be found in Results Review.     Impression:  Elderly female s/p removal of esophageal food imp[action in the OR under anesthesia with intubation and airway protection. The procedure was successful. She will be observed overnight until fully awake. Assess swallowing ability in the AM. If no chest pain and swallowing without difficulty anticipate discharge in the AM. She will need to be on BID PPI at discharge prilosec 40 mg or equivalent. She needs repeat EGD in 3-4 weeks. Mechanical soft diet for a few days.    Alaln Bradshaw MD

## 2017-11-27 NOTE — ANESTHESIA CARE TRANSFER NOTE
Patient: Sonya Green    Procedure(s):  Esophagogastroduodenoscopy with foreign body extraction   - Wound Class: II-Clean Contaminated    Diagnosis: food impaction  Diagnosis Additional Information: No value filed.    Anesthesia Type:   RSI     Note:  Airway :Face Mask  Patient transferred to:PACU  Comments: Vss, sats 100% on facemask, No pain on extubation, No adverse anesthesia events.          Vitals: (Last set prior to Anesthesia Care Transfer)    CRNA VITALS  11/26/2017 2135 - 11/26/2017 2207      11/26/2017             Resp Rate (set): 10                Electronically Signed By: Mariluz Wells MD  November 26, 2017  10:07 PM

## 2017-11-27 NOTE — ED PROVIDER NOTES
SIGN-OUT:  - Assumed care of this patient from Dr. Marinelli  - Pending at shift change: Attempted glucagon for management of esophageal foreign body, GI consult  - Tentative plan: If no success with glucagon, discussed with GI for options of possible endoscopic management of esophageal foreign body    REASSESSMENT:  - Patient still tolerating secretions here in the ED, but rather symptomatic  - While I was stabilizing another emergent patient, GI and their team/sedation team reportedly attempted to manage the esophageal foreign body via bedside endoscopy.  There reportedly were able to push some down, but the rest he would have to pull out her mouth and given that they were just doing a lighter sedation they did not feel comfortable doing so as she was not intubated/secured the airway if we are going to be bringing that out past the vocal cords.  In this case, they would like to take the patient to the OR, so that she can be intubated and they can get the remainder of the esophageal foreign body/food. Please see their documentation for full details, consent discussions, etc.   -  Presuming that goes well, we were talking about disposition plans, and we agreed that it would likely be safer to observe her overnight even if they were successful with no apparent complications given her age, etc.  I discussed this with the ED Observation attending and KENNY who are agreeable in monitoring the patient overnight after her standard postprocedural PACU monitoring.    DISPOSITION:  - From ED to OR for intubation/endoscopic management of esophageal foreign body/food bolus  - Presuming procedure goes well w/o issue, and after PACU postprocedural monitoring, can be admitted to ED Obs overnight for further monitoring  - Recommend having GI see in the morning to ensure that she is continuing to do well and provide additional disposition recommendations       Cary Dick MD  11/27/17 0291

## 2017-11-27 NOTE — ED NOTES
I asked Sonya if there is anyone she would like notified before she goes to the OR. She stated Kam Carranza, her son.  I reached him by his cell phone number (found in her chart contact list) and reported the situation and the plan to go to OR,  at some time.  I gave him the ER phone number and he would like to be notified only if there is a complication and reports he will call for an update tomorrow morning.

## 2017-11-27 NOTE — CONSULTS
Social Work Services Progress Note    Hospital Day: 2  Date of Initial Social Work Evaluation:  N/A  Collaborated with:  Pt, NP, RN    Data:  SW was consulted for Pt who is being discharged home from the Observation unit and is in need of transportation assistance. Pt has bilateral LE amputations and requires a w/c transport.    Intervention:  Writer met with Pt at bedside. Pt has transportation services through [employer]: [phone]. Upon calling the transportation service, Sierra with [employer] stated that they do not provide wheelchairs for transport. Pt needs a w/c as hers was left at home when she transported via stretcher to the hospital. Writer contacted [facility] and arranged a w/c transport for 10:45am. Pt is in agreement with [facility] transport. She lives in an elevator building and has her keys. Writer informed NP and RN of the [facility] ETA.    Assessment:  SW assisted in arranging transport for Pt who is discharged.    Plan:    Anticipated Disposition:  Home, no needs identified    Barriers to d/c plan:  None    Follow Up:  Pt will follow-up as directed.      Joceline Dunbar [facility]  Emergency Department   Pager: [phone]

## 2017-11-27 NOTE — PROGRESS NOTES
11/27/17: CM received [facility] notification that member was brought to ED for foreign body (turkey)in esophagus.  Member needed surgically removed - Member kept for observation.  Member remains in observation at this time.     Jamie Sharma RN, N  [facility]

## 2017-11-27 NOTE — H&P
[facility], [place]    History and Physical  ED/Observation     Date of Admission:  11/26/2017    Assessment & Plan   Sonya Foote is a 67 year old female who presents with esophageal FB requiring intubation for removal.    (T18.108A) Foreign body in esophagus, initial encounter   Surgical removal with GI complete   Will monitor post surgery   GI consult in the morning   Continue previous medications      Syed DANAE Kruse    Code Status   DNR / DNI    Primary Care Physician   Allan Casey    Chief Complaint   'I got some turkey stuck in my throat'    History is obtained from the patient    History of Present Illness   Sonya Foote is a 67 year old female who presents with the feeling of a foreign body noted in her throat. Symptoms started today when patient was consuming a celebratory turkey dinner for the post thanksgiving holiday season. Pt has a history of schizoaffective disorder, gender change, DM2, CHF, asthma, MI, CVA, bilat BKA, dysphagia d/t esophageal stricture. Pt states she usually should be on a mechanical soft diet but she was eating some thanksgiving turkey. She wanted to celebrate the holiday; she indulged into the festive meat but noted that the food became stuck in her esophagus. Pt reported to the ER, unsuccessful attempts to remove the turkey with glucagon; GI was consulted and recommended pt be brought to the ER for intubation prior to removal of the holiday meat from the patient's esophagus. Pt was then brought to observation to monitor post procedure and follow up with GI in the morning.    Past Medical History    I have reviewed this patient's medical history and updated it with pertinent information if needed.   Past Medical History:   Diagnosis Date     Anemia      Antiplatelet or antithrombotic long-term use      Arthritis      AS (sickle cell trait) (H) 10/8/2013     Blind left eye      BPPV (benign paroxysmal positional vertigo)      Chronic back pain       COPD (chronic obstructive pulmonary disease) (H)      Coronary artery disease      CVA (cerebral infarction) 10/8/2012    x3, residual left sided weakness     Diastolic CHF (H) 9/4/2012     Dilantin toxicity 5/31/2013     Esophageal candidiasis (H)      GERD (gastroesophageal reflux disease)      Heart attack      Hernia, abdominal      History of blood transfusion     x5     History of thrombophlebitis      HTN (hypertension) 9/4/2012     Hyperlipidemia LDL goal <100 9/4/2012     Legally blind in right eye, as defined in [place]     No periphal vision right eye     Osteoporosis 1/21/2013     Other chronic pain      PAD (peripheral artery disease) (H)      Palpitations      Person who has had sex change operation 1/20/2014     Pneumonia      Schizoaffective disorder (H)      Seizure disorder (H) 9/4/2012     Sleep apnea     CPAP     Thrombosis of leg      Type 2 diabetes, HbA1C goal < 8% (H) 9/4/2012     Uncomplicated asthma      Unspecified cerebral artery occlusion with cerebral infarction        Past Surgical History   I have reviewed this patient's surgical history and updated it with pertinent information if needed.  Past Surgical History:   Procedure Laterality Date     AMPUTATE LEG ABOVE KNEE Left 6/11/2016    Procedure: AMPUTATE LEG ABOVE KNEE;  Surgeon: Mello Rodriguez MD;  [facility]     AMPUTATE LEG BELOW KNEE Right 11/7/2016    Procedure: AMPUTATE LEG BELOW KNEE;  Surgeon: Savannah Durant MD;  [facility]     AMPUTATE REVISION STUMP LOWER EXTREMITY Right 11/11/2016    Procedure: AMPUTATE REVISION STUMP LOWER EXTREMITY;  Surgeon: Savannah Durant MD;  [facility]     AMPUTATE REVISION STUMP LOWER EXTREMITY Right 11/16/2016    Procedure: AMPUTATE REVISION STUMP LOWER EXTREMITY;  Surgeon: Savannah Durant MD;  [facility]     AMPUTATE TOE(S) Right 1/5/2016    Procedure: AMPUTATE TOE(S);  Surgeon: Mello Gaines DPM;  Location:  [facility]     ANGIOGRAM Bilateral 11/21/2014    Procedure: ANGIOGRAM;   Surgeon: Savannah Durant MD;  [facility]     ANGIOGRAM Left 1/16/2015    Procedure: ANGIOGRAM;  Surgeon: Savannah Durant MD;  [facility]     ANGIOGRAM Bilateral 9/14/2015    Procedure: ANGIOGRAM;  Surgeon: Savannah Durant MD;  [facility]     ANGIOGRAM Left 10/12/2015    Procedure: ANGIOGRAM;  Surgeon: Savannah Durant MD;  [facility]     ANGIOGRAM Right 6/6/2016    Procedure: ANGIOGRAM;  Surgeon: Savannah Durant MD;  [facility]     ANGIOPLASTY Right 6/6/2016    Procedure: ANGIOPLASTY;  Surgeon: Savannah Durant MD;  [facility]     APPENDECTOMY       BREAST SURGERY      right breast bx (benign)     CHOLECYSTECTOMY       COLONOSCOPY N/A 8/25/2014    Procedure: COLONOSCOPY;  Surgeon: Mello Ferrer MD;  [facility]     COLONOSCOPY WITH CO2 INSUFFLATION N/A 8/20/2014    Procedure: COLONOSCOPY WITH CO2 INSUFFLATION;  Surgeon: Duane, William Charles, MD;  Location: [facility]     ENDARTERECTOMY FEMORAL  5/23/2014    Procedure: ENDARTERECTOMY FEMORAL;  Surgeon: Jason Joshi MD;  [facility]     ESOPHAGOSCOPY, GASTROSCOPY, DUODENOSCOPY (EGD), COMBINED  12/14/2012    Procedure: COMBINED ESOPHAGOSCOPY, GASTROSCOPY, DUODENOSCOPY (EGD), BIOPSY SINGLE OR MULTIPLE;  ESOPHAGOSCOPY, GASTROSCOPY, DUODENOSCOPY (EGD), DILATATION ;  Surgeon: Elizabeth Stevenson MD;  Location:  GI     ESOPHAGOSCOPY, GASTROSCOPY, DUODENOSCOPY (EGD), COMBINED  12/31/2013    Procedure: COMBINED ESOPHAGOSCOPY, GASTROSCOPY, DUODENOSCOPY (EGD), BIOPSY SINGLE OR MULTIPLE;;  Surgeon: Clemente Lopez MD;  [facility]     ESOPHAGOSCOPY, GASTROSCOPY, DUODENOSCOPY (EGD), COMBINED  4/1/2014    Procedure: COMBINED ESOPHAGOSCOPY, GASTROSCOPY, DUODENOSCOPY (EGD);;  Surgeon: Clemente Lopez MD;  [facility]     ESOPHAGOSCOPY, GASTROSCOPY, DUODENOSCOPY (EGD), COMBINED  6/28/2014    Procedure: COMBINED ESOPHAGOSCOPY, GASTROSCOPY, DUODENOSCOPY (EGD);  Surgeon: Clemente Lopez MD;  [facility]     ESOPHAGOSCOPY, GASTROSCOPY,  DUODENOSCOPY (EGD), COMBINED N/A 8/20/2014    Procedure: COMBINED ESOPHAGOSCOPY, GASTROSCOPY, DUODENOSCOPY (EGD), BIOPSY SINGLE OR MULTIPLE;  Surgeon: Duane, William Charles, MD;  Location:  OR     ESOPHAGOSCOPY, GASTROSCOPY, DUODENOSCOPY (EGD), COMBINED N/A 8/22/2014    Procedure: COMBINED ESOPHAGOSCOPY, GASTROSCOPY, DUODENOSCOPY (EGD), BIOPSY SINGLE OR MULTIPLE;  Surgeon: Mello Ferrer MD;  [facility]     ESOPHAGOSCOPY, GASTROSCOPY, DUODENOSCOPY (EGD), COMBINED N/A 10/2/2014    Procedure: COMBINED ESOPHAGOSCOPY, GASTROSCOPY, DUODENOSCOPY (EGD), BIOPSY SINGLE OR MULTIPLE;  Surgeon: Remy Haskins MD;  Location:  GI     ESOPHAGOSCOPY, GASTROSCOPY, DUODENOSCOPY (EGD), COMBINED Left 12/15/2014    Procedure: COMBINED ESOPHAGOSCOPY, GASTROSCOPY, DUODENOSCOPY (EGD), BIOPSY SINGLE OR MULTIPLE;  Surgeon: Remy Haskins MD;  [facility]     ESOPHAGOSCOPY, GASTROSCOPY, DUODENOSCOPY (EGD), COMBINED N/A 2/25/2015    Procedure: COMBINED ENDOSCOPIC ULTRASOUND, ESOPHAGOSCOPY, GASTROSCOPY, DUODENOSCOPY (EGD), FINE NEEDLE ASPIRATE/BIOPSY;  Surgeon: Clemente Lugo MD;  Location:  GI     ESOPHAGOSCOPY, GASTROSCOPY, DUODENOSCOPY (EGD), COMBINED Left 2/25/2015    Procedure: COMBINED ESOPHAGOSCOPY, GASTROSCOPY, DUODENOSCOPY (EGD), BIOPSY SINGLE OR MULTIPLE;  Surgeon: Clemente Lugo MD;  Location:  GI     ESOPHAGOSCOPY, GASTROSCOPY, DUODENOSCOPY (EGD), COMBINED N/A 9/25/2016    Procedure: COMBINED ESOPHAGOSCOPY, GASTROSCOPY, DUODENOSCOPY (EGD);  Surgeon: Aziza Patiño MD;  Location:  GI     ESOPHAGOSCOPY, GASTROSCOPY, DUODENOSCOPY (EGD), COMBINED N/A 1/18/2017    Procedure: COMBINED ESOPHAGOSCOPY, GASTROSCOPY, DUODENOSCOPY (EGD), BIOPSY SINGLE OR MULTIPLE;  Surgeon: Clemente Lopez MD;  [facility]     FASCIOTOMY LOWER EXTREMITY Left 6/10/2016    Procedure: FASCIOTOMY LOWER EXTREMITY;  Surgeon: Mello Rodriguez MD;  [facility]      CAPSULE ENDOSCOPY N/A 8/25/2014     Procedure: CAPSULE/PILL CAM ENDOSCOPY;  Surgeon: Remy Haskins MD;  Location:  GI     HC CAPSULE ENDOSCOPY N/A 10/2/2014    Procedure: CAPSULE/PILL CAM ENDOSCOPY;  Surgeon: Remy Haskins MD;  Location:  GI     ORTHOPEDIC SURGERY      broken wrist repair     SEX TRANSFORMATION SURGERY, MALE TO FEMALE      1974     SINUS SURGERY      cyst removed     TONSILLECTOMY       VASCULAR SURGERY      Left carotid stent       Prior to Admission Medications   Prior to Admission Medications   Prescriptions Last Dose Informant Patient Reported? Taking?   ACETAMINOPHEN PO 11/25/2017 at 0800  Yes Yes   Sig: Take 1,000 mg by mouth every 6 hours as needed for fever Taking q4-6 hours, per MAR   ADVAIR DISKUS 250-50 MCG/DOSE diskus inhaler 11/26/2017 at 0800  Yes Yes   Sig: Inhale 1 puff into the lungs 2 times daily    CYANOCOBALAMIN PO 11/26/2017 at 0800  Yes Yes   Sig: Take 2,000 mcg by mouth daily   Cholecalciferol (VITAMIN D) 2000 UNITS tablet 11/25/2017 at 1900 Self Yes Yes   Sig: Take 2,000 Units by mouth daily.   EPINEPHrine (EPIPEN JR) 0.15 MG/0.3ML injection Unknown at Unknown time  No No   Sig: Inject 0.3 mLs (0.15 mg) into the muscle as needed for anaphylaxis   MEDICATION GIVEN BY INTRATHECAL PUMP - INSTRUCTION 11/26/2017 at 2025  Yes Yes   Sig: continuous May 19, 2017 - per [facility] in [place] [phone]:  Conc: Bupivacaine 20 mg/mL and Fentanyl 2000 mcg/mL.  Continuous: Bupivacaine 7.265 mg/day and Fentanyl 726.5 mcg/day.  Boluses: Up to 7 boluses per 24-hr period of Bupivicaine 0.599 mg and Fentanyl 59.9 mcg    Pump Refill Date at [facility]: 7/2/17   Multiple Vitamin (MULTIVITAMIN OR) 11/26/2017 at 0800 Self Yes Yes   Sig: Take 1 tablet by mouth daily    Nutritional Supplements (RENÉE) PACK 11/25/2017 at 1900  Yes Yes   Sig: Take 1 packet by mouth 2 times daily   ONDANSETRON PO 11/25/2017 at 1900  Yes Yes   Sig: Take 4 mg by mouth 3 times daily   ORDER FOR DME  11/26/2017 at Unknown time  No Yes   Sig: Equipment being ordered: Depends briefs   ORDER FOR DME 11/26/2017 at Unknown time  No Yes   Sig: Equipment being ordered: Power Wheelchair   albuterol (2.5 MG/3ML) 0.083% neb solution 11/26/2017 at 0800  No Yes   Sig: INHALE 1 VIAL VIA NEBULIZER EVERY 6 HOURS AS NEEDED   albuterol (PROAIR HFA/PROVENTIL HFA/VENTOLIN HFA) 108 (90 BASE) MCG/ACT Inhaler 11/26/2017 at 0800  No Yes   Sig: Inhale 2 puffs into the lungs every 6 hours as needed for shortness of breath / dyspnea or wheezing   aspirin EC 81 MG EC tablet 11/26/2017 at 0800  No Yes   Sig: Take 1 tablet (81 mg) by mouth daily   Patient taking differently: Take 81 mg by mouth every morning    atorvastatin (LIPITOR) 40 MG tablet 11/26/2017 at 0800  No Yes   Sig: Take 1 tablet (40 mg) by mouth daily   Patient taking differently: Take 40 mg by mouth At Bedtime    blood glucose monitoring (ONE TOUCH ULTRA 2) meter device kit 11/26/2017 at Unknown time  No Yes   Sig: Use to test blood sugars 3 times daily or as directed.   blood glucose monitoring (ONE TOUCH ULTRA) test strip 11/26/2017 at Unknown time  No Yes   Sig: Use to test blood sugars 3 times daily or as directed.   blood glucose monitoring (ONE TOUCH ULTRASOFT) lancets 11/26/2017 at Unknown time  No Yes   Sig: Use to test blood sugar 3 times daily or as directed.   brimonidine (ALPHAGAN) 0.2 % ophthalmic solution 11/26/2017 at Unknown time  No Yes   Sig: Place 1 drop into the right eye 3 times daily   Patient taking differently: Place 1 drop into the right eye 2 times daily    calcium citrate-vitamin D (CITRACAL) 315-250 MG-UNIT TABS 11/26/2017 at 0800  No Yes   Sig: Take 2 tablets by mouth daily   cetirizine (ZYRTEC) 10 MG tablet 11/26/2017 at 0800  No Yes   Sig: Take 1 tablet (10 mg) by mouth daily as needed for allergies   cyclobenzaprine (FLEXERIL) 10 MG tablet 11/26/2017 at 0800  No Yes   Sig: Take 1 tablet (10 mg) by mouth 2 times daily as needed for muscle  spasms   diclofenac (VOLTAREN) 1 % GEL topical gel 11/26/2017 at 1800  No Yes   Sig: Apply 2 g topically 4 times daily to hands   dorzolamide (TRUSOPT) 2 % ophthalmic solution 11/26/2017 at Unknown time  Yes Yes   Sig: Place 1 drop into both eyes 2 times daily    dorzolamide-timolol (COSOPT) 2-0.5 % ophthalmic solution 11/26/2017 at Unknown time  No Yes   Sig: Place 1 drop into the right eye 2 times daily   escitalopram (LEXAPRO) 5 MG tablet 11/26/2017 at 0800  No Yes   Sig: Take 2 tablets (10 mg) by mouth daily   estradiol (ESTRACE) 1 MG tablet 11/26/2017 at 0800  No Yes   Sig: Take 1 tablet (1 mg) by mouth daily   ferrous sulfate (IRON) 325 (65 FE) MG tablet 11/26/2017 at 0800  No Yes   Sig: Take 1 tablet (325 mg) by mouth 2 times daily With meals   fluticasone (FLONASE) 50 MCG/ACT nasal spray 11/26/2017 at 0800  Yes Yes   Sig: Spray 1 spray into both nostrils daily   hydrochlorothiazide (MICROZIDE) 12.5 MG capsule 11/26/2017 at 0800  No Yes   Sig: Take 1 capsule (12.5 mg) by mouth daily   Patient taking differently: Take 12.5 mg by mouth daily Hold for sbp<90   latanoprost (XALATAN) 0.005 % ophthalmic solution 11/26/2017 at Unknown time  No Yes   Sig: Place 1 drop into both eyes At Bedtime   levETIRAcetam (KEPPRA) 500 MG tablet 11/26/2017 at 0800  No Yes   Sig: Take 1 tablet (500 mg) by mouth 2 times daily   lidocaine (LIDODERM) 5 % Patch 11/26/2017 at 2030  No Yes   Sig: APPLY 1 TO 3 PATCHES TO PAINFUL AREA AT ONCE FOR UP TO 12 HOURS WITHIN A 24 HOUR PERIOD REMOVE AFTER 12 HOURS   lisinopril (PRINIVIL/ZESTRIL) 10 MG tablet 11/26/2017 at 0800  No Yes   Sig: Take 1 tablet (10 mg) by mouth daily   metFORMIN (GLUCOPHAGE-XR) 500 MG 24 hr tablet 11/25/2017 at 1900  No Yes   Sig: Take 1 tablet (500 mg) by mouth daily (with dinner)   metoprolol (TOPROL-XL) 25 MG 24 hr tablet 11/25/2017 at 1900  No Yes   Sig: TAKE 1 TABLET (25 MG) BY MOUTH DAILY   nitroglycerin (NITROSTAT) 0.4 MG SL tablet Unknown at Unknown time  No No    Sig: Place 1 tablet (0.4 mg) under the tongue every 5 minutes as needed for chest pain if you are still having symptoms after 3 doses (15 minutes) call 911.   order for DME 11/26/2017 at Unknown time  No Yes   Sig: Equipment being ordered: Glucerna daily shakes with each meal   order for DME 11/26/2017 at Unknown time  No Yes   Sig: Equipment being ordered: Nebulizer and tubing supplies   order for DME 11/26/2017 at Unknown time  No Yes   Sig: Equipment being ordered: CPAP supplies mask, hose, filters, cushion    fax to [facility] at [phone]   order for DME 11/26/2017 at Unknown time  No Yes   Sig: Equipment being ordered: CPAP supplies mask, hose, filters, cushion fax to [facility] at [phone]  Disp: 10 Device Refills: prn   Class: Local Print Start: 2/10/2017   order for DME 11/26/2017 at Unknown time  No Yes   Sig: Hospital bed with side rails   [place] [facility] [place] at Unknown time  No Yes   Sig: Full electric hospital bed with half rails    Dx: K05691, I110, J449  Length of need: lifetime   [place] [facility] [place] at Unknown time  No Yes   Sig: Wheel Chair Cushion: 18 x 18 inch Roho cushion   pantoprazole (PROTONIX) 40 MG EC tablet 11/25/2017 at 1900  Yes Yes   Sig: Take 40 mg by mouth daily   phenytoin 200 MG CAPS 11/26/2017 at 0800  No Yes   Sig: Take 200 mg by mouth 2 times daily   Patient taking differently: Take 200 mg by mouth 2 times daily (takes at 8 AM and 8 PM)   polyethylene glycol (MIRALAX/GLYCOLAX) Packet 11/26/2017 at Unknown time  Yes Yes   Sig: Take 17 g by mouth daily as needed Dissolved in water or juice    pregabalin (LYRICA) 75 MG capsule 11/26/2017 at 0800  No Yes   Sig: Take 2 capsules (150 mg) by mouth 2 times daily   prochlorperazine (COMPAZINE) 10 MG tablet 11/26/2017 at 0800  No Yes   Sig: Take 1 tablet (10 mg) by mouth every 8 hours as needed   risperiDONE (RISPERDAL) 0.5 MG tablet 11/25/2017 at 1900  Yes Yes   Sig: Take 0.5 mg by mouth At Bedtime   sucralfate  (CARAFATE) 1 GM tablet 11/25/2017 at 1900  No Yes   Sig: Take 1 tablet (1 g) by mouth 4 times daily May dissolve in 10 mL water is necessary. (Start upon completion of carafate suspension)   traZODone (DESYREL) 100 MG tablet 11/26/2017 at 0800  No Yes   Sig: Take 1.5 tablets (150 mg) by mouth At Bedtime   umeclidinium (INCRUSE ELLIPTA) 62.5 MCG/INH oral inhaler 11/26/2017 at 0800  Yes Yes   Sig: Inhale 1 puff into the lungs daily   zinc 50 MG TABS 11/26/2017 at 0800  Yes Yes   Sig: Take 1 tablet by mouth daily      Facility-Administered Medications: None     Allergies   Allergies   Allergen Reactions     Bee Venom      Penicillins Anaphylaxis     Other reaction(s): Skin Rash and/or Hives     Dilantin [Phenytoin] Other (See Comments)     Generic dilantin only per pt     Iodide Hives     09/11/15: Pt states she can use iodine and doesn't have any problems      Iodine-131      Novocaine [Procaine] Hives     Other reaction(s): Skin Rash and/or Hives     Tositumomab        Social History   I have reviewed this patient's social history and updated it with pertinent information if needed. Sonya Foote  reports that she has been smoking Cigarettes and Cigars.  She has a 75.00 pack-year smoking history. She has never used smokeless tobacco. She reports that she does not drink alcohol or use illicit drugs.    Family History   I have reviewed this patient's family history and updated it with pertinent information if needed.   Family History   Problem Relation Age of Onset     Dementia Mother      Glaucoma Mother      DIABETES Mother      may have been type 1, childhood     Coronary Artery Disease Mother      MI     Glaucoma Father      DIABETES Father      may hev been type 1 - ??     Heart Failure Father      CANCER Maternal Aunt      leukemia     Schizophrenia Brother      Depression Brother      Suicide Sister      Depression Sister      DIABETES Sister      CANCER Maternal Aunt      ovarian     Glaucoma Maternal Grandmother       DIABETES Maternal Grandmother      Glaucoma Maternal Grandfather      DIABETES Maternal Grandfather      Glaucoma Paternal Grandmother      DIABETES Paternal Grandmother      Glaucoma Paternal Grandfather      DIABETES Paternal Grandfather      Breast Cancer Sister      CEREBROVASCULAR DISEASE Brother      Colon Cancer No family hx of        Review of Systems   The 10 point Review of Systems is negative other than noted in the HPI or here.     Physical Exam   Temp: 98.6  F (37  C) Temp src: Oral BP: 129/64   Heart Rate: 78 Resp: 16 SpO2: 96 % O2 Device: Nasal cannula Oxygen Delivery: 2 LPM  Vital Signs with Ranges  Temp:  [97.3  F (36.3  C)-99.3  F (37.4  C)] 98.6  F (37  C)  Heart Rate:  [69-89] 78  Resp:  [7-40] 16  BP: (104-154)/() 129/64  SpO2:  [84 %-100 %] 96 %  0 lbs 0 oz    Constitutional: awake, alert, cooperative, no apparent distress, and appears stated age  ENT: Normocephalic, without obvious abnormality, atraumatic, sinuses nontender on palpation, external ears without lesions, oral pharynx with moist mucous membranes, tonsils without erythema or exudates, gums normal and good dentition.  Respiratory: No increased work of breathing, good air exchange, clear to auscultation bilaterally, no crackles or wheezing  Cardiovascular: Normal apical impulse, regular rate and rhythm, normal S1 and S2, no S3 or S4, and no murmur noted  Skin: baseline  Musculoskeletal: There is no redness, warmth, or swelling of the joints.  Full range of motion noted.  Motor strength is 5 out of 5 all extremities bilaterally.  Tone is normal.  Neurologic: Awake, alert, oriented to name, place and time.  Cranial nerves II-XII are grossly intact.  Motor is 5 out of 5 bilaterally.  Cerebellar finger to nose, heel to shin intact.  Sensory is intact.  Babinski down going, Romberg negative, and gait is normal.  Neuropsychiatric: General: normal, calm and normal eye contact    Data   Results for orders placed or performed during  the hospital encounter of 11/26/17 (from the past 24 hour(s))   CBC with platelets differential   Result Value Ref Range    WBC 10.0 4.0 - 11.0 10e9/L    RBC Count 4.28 3.8 - 5.2 10e12/L    Hemoglobin 11.5 (L) 11.7 - 15.7 g/dL    Hematocrit 36.5 35.0 - 47.0 %    MCV 85 78 - 100 fl    MCH 26.9 26.5 - 33.0 pg    MCHC 31.5 31.5 - 36.5 g/dL    RDW 16.2 (H) 10.0 - 15.0 %    Platelet Count 288 150 - 450 10e9/L    Diff Method Automated Method     % Neutrophils 68.9 %    % Lymphocytes 19.6 %    % Monocytes 9.4 %    % Eosinophils 1.5 %    % Basophils 0.4 %    % Immature Granulocytes 0.2 %    Nucleated RBCs 0 0 /100    Absolute Neutrophil 6.9 1.6 - 8.3 10e9/L    Absolute Lymphocytes 2.0 0.8 - 5.3 10e9/L    Absolute Monocytes 0.9 0.0 - 1.3 10e9/L    Absolute Eosinophils 0.2 0.0 - 0.7 10e9/L    Absolute Basophils 0.0 0.0 - 0.2 10e9/L    Abs Immature Granulocytes 0.0 0 - 0.4 10e9/L    Absolute Nucleated RBC 0.0    Basic metabolic panel   Result Value Ref Range    Sodium 143 133 - 144 mmol/L    Potassium 3.8 3.4 - 5.3 mmol/L    Chloride 108 94 - 109 mmol/L    Carbon Dioxide 28 20 - 32 mmol/L    Anion Gap 7 3 - 14 mmol/L    Glucose 76 70 - 99 mg/dL    Urea Nitrogen 9 7 - 30 mg/dL    Creatinine 0.59 0.52 - 1.04 mg/dL    GFR Estimate >90 >60 mL/min/1.7m2    GFR Estimate If Black >90 >60 mL/min/1.7m2    Calcium 9.2 8.5 - 10.1 mg/dL   UPPER GI ENDOSCOPY   Result Value Ref Range    Upper GI Endoscopy       [address] [place] [address] [phone]     Endoscopy Department  _______________________________________________________________________________  Patient Name: Sonya Foote              Procedure Date: 11/26/2017 6:23 PM  MRN: [id number]                       Account Number: [id number]  YOB: 1950              Admit Type: Inpatient  Age: 67                                Gender: Female  Note Status: Finalized                Attending MD: Emerita Salter for the  Cause: pause for cause completed Total Sedation Time: 15 min of   face to face sedation time  _______________________________________________________________________________     Procedure:           Upper GI endoscopy  Indications:         Foreign body in the esophagus  Providers:           Herberth Castrejon, Mello Styles MD, Ernestine Dominguez,                        RN  Patient Profile:     presumed turkey impaction in esophagus.  Referring MD:          Medici leander:           Midazolam 3.5 mg IV, Fentanyl 75 micrograms IV  Complications:       No immediate complications.  _______________________________________________________________________________  Procedure:           Pre-Anesthesia Assessment:                       - Prior to the procedure, a History and Physical was                        performed, and patient medications and allergies were                        reviewed. The patient is competent. The risks and                        benefits of the procedure and the sedation options and                        risks were discussed with the patient. All questions                        were answered and informed consent was obtained. Patient                        identification and proposed procedure were verified by                        the physician and the nurse in the pre-procedure area in                        the procedure room. Mental Status Examination: alert and                        oriented. Airway Examination: normal o ropharyngeal                        airway and neck mobility. Respiratory Examination: clear                        to auscultation. CV Examination: normal. Prophylactic                        Antibiotics: The patient does not require prophylactic                        antibiotics. Prior Anticoagulants: The patient has taken                        no previous anticoagulant or antiplatelet agents. ASA                        Grade Assessment: III - A patient with severe  systemic                        disease. After reviewing the risks and benefits, the                        patient was deemed in satisfactory condition to undergo                        the procedure. The anesthesia plan was to use moderate                        sedation / analgesia (conscious sedation). Immediately                        prior to administration of medications, the patient was                        re-assessed for adequacy to receive sedatives. The heart                        rate, respiratory rate,  oxygen saturations, blood                        pressure, adequacy of pulmonary ventilation, and                        response to care were monitored throughout the                        procedure. The physical status of the patient was                        re-assessed after the procedure.                       - Airway Examination: Mallampati Class IV (tongue                        obstructs view of the soft palate).                       After obtaining informed consent, the endoscope was                        passed under direct vision. Throughout the procedure,                        the patient's blood pressure, pulse, and oxygen                        saturations were monitored continuously. The Endoscope                        was introduced through the mouth, with the intention of                        advancing to the duodenum. The scope was advanced to the                        middle third of the esophagus before the procedure was                        aborted. Med ications were given. The procedure was                        aborted due to presence of food. The upper GI endoscopy                        was accomplished without difficulty. The patient                        tolerated the procedure fairly well.                                                                                   Findings:       Food was found in the middle third of the esophagus. Biopsies forceps         were used to attempt to dislodge the food enough to pass. A rothnet was        attempted but could not encompass bolus.                                                                                   Impression:          - The procedure was aborted due to presence of food.                       - Food in the middle third of the esophagus. Unable to                        be removed.  Recommendation:      - Transport patient to OR for intubation for airway                        protection                       - Repeat EGD in the OR.                                                                                      Electronically signed by: Herberth Castrejon MD  _____________________  Herberth Castrejon,   11/26/2017 8:05:27 PM  I was physically present for the entire viewing portion of the exam.  __________________________  Signature of teaching physician  [id number] Castrejon    _____________________  Mello Styles MD  Number of Addenda: 0    Note Initiated On: 11/26/2017 6:23 PM  Scope In:  Scope Out:      Narrative    Mello Styles MD     11/26/2017 10:19 PM  EGD performed.     Findings  Large food bolus (chicken meat) found in the mid esophagus.   Enough was removed by gallego net/forceps/snare to allow endoscope   to push rest of the food bolus into stomach.   Stricture in at GE junction, likely 2/2 to reflux. Scope passed.  Moderate size hiatal hernia.   Stomach had few small polyps, benign appearing.   Duodenum normal.     Recommendations  - BID PPi  - Clear liquid diet, may advance to strict soft diet as   tolerated.   - Anti reflux life style.   - Repeat EGD in 3-4 weeks for dilation of esophageal stricture   (GI will coordinate).    Mello Styles MD   GI Fellow         Glucose by meter   Result Value Ref Range    Glucose 70 70 - 99 mg/dL   Glucose by meter   Result Value Ref Range    Glucose 165 (H) 70 - 99 mg/dL   Glucose by meter   Result Value Ref Range    Glucose 139 (H) 70 - 99 mg/dL      *Note: Due to a large number of results and/or encounters for the requested time period, some results have not been displayed. A complete set of results can be found in Results Review.

## 2017-11-27 NOTE — PLAN OF CARE
Problem: Patient Care Overview  Goal: Plan of Care/Patient Progress Review  1. GI consult in morning; awaiting consult   2. able to swallow liquids s difficulty; yes  3. no nausea/vomiting; no issues  4. back to functional baseline: yes  Goals not met

## 2017-11-27 NOTE — ANESTHESIA POSTPROCEDURE EVALUATION
Patient: Sonya Foote    Procedure(s):  Esophagogastroduodenoscopy with foreign body extraction   - Wound Class: II-Clean Contaminated    Diagnosis:food impaction  Diagnosis Additional Information: No value filed.    Anesthesia Type:  RSI    Note:  Anesthesia Post Evaluation    Patient location during evaluation: PACU  Patient participation: Able to fully participate in evaluation  Level of consciousness: awake and alert  Pain management: satisfactory to patient  Airway patency: patent  Cardiovascular status: blood pressure returned to baseline and hemodynamically stable  Respiratory status: nasal cannula  Hydration status: euvolemic  PONV: none     Anesthetic complications: None          Last vitals:  Vitals:    11/26/17 2030 11/26/17 2204 11/26/17 2215   BP: 128/72 126/72 111/58   Resp: 10 12 18   Temp: 36.9  C (98.4  F) 36.6  C (97.9  F) 36.5  C (97.7  F)   SpO2: 98% 100% 96%         Electronically Signed By: Kg Roy MD  November 26, 2017  10:29 PM

## 2017-11-27 NOTE — DISCHARGE SUMMARY
Discharge Summary    Soyna Foote MRN# [id number]   YOB: 1950 Age: 67 year old     Date of Admission:  11/26/2017  Date of Discharge:  11/27/2017  Admitting Physician:  aCry Dick MD  Discharge Physician:  Ronald Prieto MD  Discharging Service:  Emergency Medicine     Primary Provider: Allan Casey          Discharge Diagnosis:     Food impaction of esophagus    Esophageal foreign body    * No resolved hospital problems. *               Discharge Disposition:   Discharged to home           Condition on Discharge:   Discharge condition: Good   Code status on discharge: Full Code           Procedures:   Endoscopy results:   Large food bolus (chicken meat) found in the mid esophagus. Enough was removed by gallego net/forceps/snare to allow endoscope to push rest of the food bolus into stomach.   Stricture in at GE junction, likely 2/2 to reflux. Scope passed.  Moderate size hiatal hernia.   Stomach had few small polyps, benign appearing.   Duodenum normal.                 Discharge Medications:   Current Discharge Medication List      CONTINUE these medications which have NOT CHANGED    Details   metFORMIN (GLUCOPHAGE-XR) 500 MG 24 hr tablet Take 1 tablet (500 mg) by mouth daily (with dinner)  Qty: 90 tablet, Refills: 3    Associated Diagnoses: Type 2 diabetes mellitus with diabetic peripheral angiopathy and gangrene, without long-term current use of insulin (H)      brimonidine (ALPHAGAN) 0.2 % ophthalmic solution Place 1 drop into the right eye 3 times daily  Qty: 15 mL, Refills: 11    Associated Diagnoses: Primary open angle glaucoma of both eyes, severe stage      dorzolamide-timolol (COSOPT) 2-0.5 % ophthalmic solution Place 1 drop into the right eye 2 times daily  Qty: 1 Bottle, Refills: 11    Associated Diagnoses: Primary open angle glaucoma of both eyes, severe stage      sucralfate (CARAFATE) 1 GM tablet Take 1 tablet (1 g) by mouth 4 times daily May dissolve in 10 mL water is necessary.  (Start upon completion of carafate suspension)  Qty: 120 tablet, Refills: 11    Associated Diagnoses: Adjustment disorder with depressed mood      escitalopram (LEXAPRO) 5 MG tablet Take 2 tablets (10 mg) by mouth daily  Qty: 60 tablet, Refills: 5    Associated Diagnoses: Adjustment disorder with depressed mood      albuterol (PROAIR HFA/PROVENTIL HFA/VENTOLIN HFA) 108 (90 BASE) MCG/ACT Inhaler Inhale 2 puffs into the lungs every 6 hours as needed for shortness of breath / dyspnea or wheezing  Qty: 3 Inhaler, Refills: 1    Associated Diagnoses: JOSE (obstructive sleep apnea)      umeclidinium (INCRUSE ELLIPTA) 62.5 MCG/INH oral inhaler Inhale 1 puff into the lungs daily      ONDANSETRON PO Take 4 mg by mouth 3 times daily      cyclobenzaprine (FLEXERIL) 10 MG tablet Take 1 tablet (10 mg) by mouth 2 times daily as needed for muscle spasms  Qty: 30 tablet, Refills: 1    Associated Diagnoses: Cervicalgia      lidocaine (LIDODERM) 5 % Patch APPLY 1 TO 3 PATCHES TO PAINFUL AREA AT ONCE FOR UP TO 12 HOURS WITHIN A 24 HOUR PERIOD REMOVE AFTER 12 HOURS  Qty: 30 patch, Refills: 5    Associated Diagnoses: Chronic pain syndrome; 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 (H)      blood glucose monitoring (ONE TOUCH ULTRASOFT) lancets Use to test blood sugar 3 times daily or as directed.  Qty: 100 each, Refills: PRN    Associated Diagnoses: Type 2 diabetes mellitus with diabetic peripheral angiopathy without gangrene, without long-term current use of insulin (H)      blood glucose monitoring (ONE TOUCH ULTRA) test strip Use to test blood sugars 3 times daily or as directed.  Qty: 3 Box, Refills: 3    Associated Diagnoses: Type 2 diabetes mellitus with diabetic peripheral angiopathy without gangrene, without long-term current use of insulin (H)      metoprolol (TOPROL-XL) 25 MG 24 hr tablet TAKE 1 TABLET (25 MG) BY MOUTH DAILY  Qty: 30 tablet, Refills: 10    Associated Diagnoses: Old myocardial infarction       traZODone (DESYREL) 100 MG tablet Take 1.5 tablets (150 mg) by mouth At Bedtime  Qty: 90 tablet, Refills: 3    Associated Diagnoses: Anxiety state      !! order for [facility] Full electric hospital bed with half rails    Dx: Q21120, I110, J449  Length of need: lifetime  Qty: 1 Device, Refills: 0    Associated Diagnoses: Status post bilateral above knee amputation (H)      !! order for DME Wheel Chair Cushion: 18 x 18 inch Roho cushion  Qty: 1 Device, Refills: 0    Associated Diagnoses: Status post bilateral above knee amputation (H)      !! order for DME Hospital bed with side rails  Qty: 1 Device, Refills: 0    Associated Diagnoses: 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 (H)      polyethylene glycol (MIRALAX/GLYCOLAX) Packet Take 17 g by mouth daily as needed Dissolved in water or juice       ACETAMINOPHEN PO Take 1,000 mg by mouth every 6 hours as needed for fever Taking q4-6 hours, per MAR      pregabalin (LYRICA) 75 MG capsule Take 2 capsules (150 mg) by mouth 2 times daily  Qty: 120 capsule, Refills: 5    Associated Diagnoses: Pain in both upper extremities      cetirizine (ZYRTEC) 10 MG tablet Take 1 tablet (10 mg) by mouth daily as needed for allergies  Qty: 90 tablet, Refills: 3    Associated Diagnoses: Seasonal allergic rhinitis, unspecified allergic rhinitis trigger      diclofenac (VOLTAREN) 1 % GEL topical gel Apply 2 g topically 4 times daily to hands  Qty: 100 g, Refills: 11    Associated Diagnoses: Primary osteoarthritis of both hands      lisinopril (PRINIVIL/ZESTRIL) 10 MG tablet Take 1 tablet (10 mg) by mouth daily  Qty: 90 tablet, Refills: 3    Associated Diagnoses: Essential hypertension with goal blood pressure less than 130/80      pantoprazole (PROTONIX) 40 MG EC tablet Take 40 mg by mouth daily      risperiDONE (RISPERDAL) 0.5 MG tablet Take 0.5 mg by mouth At Bedtime      ferrous sulfate (IRON) 325 (65 FE) MG tablet Take 1 tablet (325 mg) by mouth 2 times daily With  meals  Qty: 60 tablet, Refills: 2      !! order for DME Equipment being ordered: CPAP supplies mask, hose, filters, cushion    fax to [facility] at [phone]  Qty: 10 Device, Refills: prn    Associated Diagnoses: COPD (chronic obstructive pulmonary disease) (H)      !! order for DME Equipment being ordered: CPAP supplies mask, hose, filters, cushion fax to [facility] at [phone]  Disp: 10 Device Refills: prn   Class: Local Print Start: 2/10/2017  Qty: 1 Device, Refills: 0    Associated Diagnoses: Chronic obstructive pulmonary disease, unspecified COPD type (H)      !! order for DME Equipment being ordered: Nebulizer and tubing supplies  Qty: 1 Units, Refills: 0    Associated Diagnoses: Simple chronic bronchitis (H)      albuterol (2.5 MG/3ML) 0.083% neb solution INHALE 1 VIAL VIA NEBULIZER EVERY 6 HOURS AS NEEDED  Qty: 360 mL, Refills: 11    Associated Diagnoses: Chronic obstructive pulmonary disease, unspecified COPD type (H)      CYANOCOBALAMIN PO Take 2,000 mcg by mouth daily      Nutritional Supplements (RENÉE) PACK Take 1 packet by mouth 2 times daily      fluticasone (FLONASE) 50 MCG/ACT nasal spray Spray 1 spray into both nostrils daily      ADVAIR DISKUS 250-50 MCG/DOSE diskus inhaler Inhale 1 puff into the lungs 2 times daily       calcium citrate-vitamin D (CITRACAL) 315-250 MG-UNIT TABS Take 2 tablets by mouth daily  Qty: 120 tablet, Refills: 5    Associated Diagnoses: Osteoporosis      hydrochlorothiazide (MICROZIDE) 12.5 MG capsule Take 1 capsule (12.5 mg) by mouth daily  Qty: 90 capsule, Refills: 3    Associated Diagnoses: Essential hypertension with goal blood pressure less than 130/80      estradiol (ESTRACE) 1 MG tablet Take 1 tablet (1 mg) by mouth daily  Qty: 90 tablet, Refills: 3    Associated Diagnoses: Person who has had sex change operation      levETIRAcetam (KEPPRA) 500 MG tablet Take 1 tablet (500 mg) by mouth 2 times daily  Qty: 180 tablet, Refills: 1    Associated  Diagnoses: Nausea      aspirin EC 81 MG EC tablet Take 1 tablet (81 mg) by mouth daily  Qty: 90 tablet, Refills: 3    Associated Diagnoses: Unstable angina (H)      blood glucose monitoring (ONE TOUCH ULTRA 2) meter device kit Use to test blood sugars 3 times daily or as directed.  Qty: 1 kit, Refills: 0    Associated Diagnoses: Type 2 diabetes, HbA1C goal < 8% (H)      phenytoin 200 MG CAPS Take 200 mg by mouth 2 times daily  Qty: 60 capsule, Refills: 0    Associated Diagnoses: Seizure disorder (H)      dorzolamide (TRUSOPT) 2 % ophthalmic solution Place 1 drop into both eyes 2 times daily       zinc 50 MG TABS Take 1 tablet by mouth daily      MEDICATION GIVEN BY INTRATHECAL PUMP - INSTRUCTION continuous May 19, 2017 - per [facility] in [place] [phone]:  Conc: Bupivacaine 20 mg/mL and Fentanyl 2000 mcg/mL.  Continuous: Bupivacaine 7.265 mg/day and Fentanyl 726.5 mcg/day.  Boluses: Up to 7 boluses per 24-hr period of Bupivicaine 0.599 mg and Fentanyl 59.9 mcg    Pump Refill Date at [facility]: 7/2/17      latanoprost (XALATAN) 0.005 % ophthalmic solution Place 1 drop into both eyes At Bedtime  Qty: 2.5 mL, Refills: 11    Comments: Plz remind pt to f/u as scheduled, 3/25/16. Thanks!  Associated Diagnoses: Primary open angle glaucoma, stage unspecified      atorvastatin (LIPITOR) 40 MG tablet Take 1 tablet (40 mg) by mouth daily  Qty: 90 tablet, Refills: 3    Associated Diagnoses: Hyperlipidemia LDL goal <100      !! order for DME Equipment being ordered: Glucerna daily shakes with each meal  Qty: 1 Box, Refills: 11    Associated Diagnoses: Type 2 diabetes mellitus with other diabetic neurological complication      prochlorperazine (COMPAZINE) 10 MG tablet Take 1 tablet (10 mg) by mouth every 8 hours as needed  Qty: 20 tablet, Refills: 0    Associated Diagnoses: Nausea with vomiting      !! ORDER FOR DME Equipment being ordered: Power Wheelchair  Qty: 1 Device, Refills: 0     Associated Diagnoses: CVA (cerebral infarction); HTN (hypertension)      !! ORDER FOR DME Equipment being ordered: Depends briefs  Qty: 1 Month, Refills: 11    Associated Diagnoses: Incontinence      Cholecalciferol (VITAMIN D) 2000 UNITS tablet Take 2,000 Units by mouth daily.  Qty: 100 tablet, Refills: 3      Multiple Vitamin (MULTIVITAMIN OR) Take 1 tablet by mouth daily       EPINEPHrine (EPIPEN JR) 0.15 MG/0.3ML injection Inject 0.3 mLs (0.15 mg) into the muscle as needed for anaphylaxis  Qty: 0.6 mL, Refills: 1    Associated Diagnoses: Bee sting reaction, undetermined intent, subsequent encounter      nitroglycerin (NITROSTAT) 0.4 MG SL tablet Place 1 tablet (0.4 mg) under the tongue every 5 minutes as needed for chest pain if you are still having symptoms after 3 doses (15 minutes) call 911.  Qty: 25 tablet, Refills: 1    Associated Diagnoses: Chronic systolic congestive heart failure (H); Old myocardial infarction       !! - Potential duplicate medications found. Please discuss with provider.                Consultations:   Consultation during this admission received from gastroenterology             Brief History of Illness:   Please see detailed H&P from 11/26/2017, in brief: 67 year old female with past medical history of COPD, Hyperlipidemia, BKA, Schizoaffective disorder, Seizure disorder presented to the emergency department with food stuck in throat.   In ED, unable to remove esophageal impaction. Sent to OR under general anesthesia with intubation and airway protection. Procedure was successful. Noted to also have esophageal stricture which GI will schedule repeat EGD in 3-4 weeks for dilation of esophageal stricture. Admitted to ED Observation overnight for clinical monitoring.          Hospital Course:    s/p removal of esophageal food impaction in the OR under general anesthesia with intubation and airway protection. The procedure was successful. Clinically monitored overnight. No hypoxia. Airway  patent. No chest pain or shortness of breath. Hemodynamically stable. Was able to tolerate full liquid last night and mechanical soft diet this morning.  GI called this morning and said no need to see patient-> she needs to be on BID PPI Protonix, not to advance further than mechanical soft diet and GI will arrange for repeat EGD in 3-4 weeks for dilation of esophogeal stricture.    arranged for ride home transportation.              Final Day of Progress before Discharge:       Physical Exam:  Blood pressure 144/65, temperature 98.1  F (36.7  C), temperature source Oral, resp. rate 16, SpO2 96 %, not currently breastfeeding.    EXAM:  GEN:  AOx3.  NAD.  Pleasant.  HEENT:  Sclerae anicteric.  Conjunctivae pink.  MMM.  NECK:  Supple.  No LAD.  CV:  RRR  LUNGS: Lungs clear to ausculation. No wheezing or rales. Non-labored breathing.  BACK:  No midline or CVA tenderness.  ABD:  Soft.  NT.  ND.  No rebound or guarding.  No masses.  EXT:  Bilateral BKA  SKIN:  Warm.  Dry.  No rashes.  NEURO:  CN grossly intact. 5/5 motor strength to all extremities.  PSYCH: Mentation/Affect Bright         Data:  All laboratory data reviewed             Significant Results:    Large food bolus (chicken meat) found in the mid esophagus. Enough was removed by gallego net/forceps/snare to allow endoscope to push rest of the food bolus into stomach.   Stricture in at GE junction, likely 2/2 to reflux. Scope passed.       Results for orders placed or performed during the hospital encounter of 11/26/17 (from the past 24 hour(s))   CBC with platelets differential   Result Value Ref Range    WBC 10.0 4.0 - 11.0 10e9/L    RBC Count 4.28 3.8 - 5.2 10e12/L    Hemoglobin 11.5 (L) 11.7 - 15.7 g/dL    Hematocrit 36.5 35.0 - 47.0 %    MCV 85 78 - 100 fl    MCH 26.9 26.5 - 33.0 pg    MCHC 31.5 31.5 - 36.5 g/dL    RDW 16.2 (H) 10.0 - 15.0 %    Platelet Count 288 150 - 450 10e9/L    Diff Method Automated Method     % Neutrophils 68.9 %    %  Lymphocytes 19.6 %    % Monocytes 9.4 %    % Eosinophils 1.5 %    % Basophils 0.4 %    % Immature Granulocytes 0.2 %    Nucleated RBCs 0 0 /100    Absolute Neutrophil 6.9 1.6 - 8.3 10e9/L    Absolute Lymphocytes 2.0 0.8 - 5.3 10e9/L    Absolute Monocytes 0.9 0.0 - 1.3 10e9/L    Absolute Eosinophils 0.2 0.0 - 0.7 10e9/L    Absolute Basophils 0.0 0.0 - 0.2 10e9/L    Abs Immature Granulocytes 0.0 0 - 0.4 10e9/L    Absolute Nucleated RBC 0.0    Basic metabolic panel   Result Value Ref Range    Sodium 143 133 - 144 mmol/L    Potassium 3.8 3.4 - 5.3 mmol/L    Chloride 108 94 - 109 mmol/L    Carbon Dioxide 28 20 - 32 mmol/L    Anion Gap 7 3 - 14 mmol/L    Glucose 76 70 - 99 mg/dL    Urea Nitrogen 9 7 - 30 mg/dL    Creatinine 0.59 0.52 - 1.04 mg/dL    GFR Estimate >90 >60 mL/min/1.7m2    GFR Estimate If Black >90 >60 mL/min/1.7m2    Calcium 9.2 8.5 - 10.1 mg/dL   UPPER GI ENDOSCOPY   Result Value Ref Range    Upper GI Endoscopy       [address] [address] [phone]     Endoscopy Department  _______________________________________________________________________________  Patient Name: Sonya Foote              Procedure Date: 11/26/2017 6:23 PM  MRN: [id number]                       Account Number: [id number]  YOB: 1950              Admit Type: Inpatient  Age: 67                                Gender: Female  Note Status: Finalized                Attending MD: Herberth Castrejon ,   Pause for the Cause: pause for cause completed Total Sedation Time: 15 min of   face to face sedation time  _______________________________________________________________________________     Procedure:           Upper GI endoscopy  Indications:         Foreign body in the esophagus  Providers:           Herberth Castrejon, Mello Styles MD, Ernestine Dominguez,                        RN  Patient Profile:     presumed turkey impaction in esophagus.  Referring MD:          Adalid leander:            Midazolam 3.5 mg IV, Fentanyl 75 micrograms IV  Complications:       No immediate complications.  _______________________________________________________________________________  Procedure:           Pre-Anesthesia Assessment:                       - Prior to the procedure, a History and Physical was                        performed, and patient medications and allergies were                        reviewed. The patient is competent. The risks and                        benefits of the procedure and the sedation options and                        risks were discussed with the patient. All questions                        were answered and informed consent was obtained. Patient                        identification and proposed procedure were verified by                        the physician and the nurse in the pre-procedure area in                        the procedure room. Mental Status Examination: alert and                        oriented. Airway Examination: normal o ropharyngeal                        airway and neck mobility. Respiratory Examination: clear                        to auscultation. CV Examination: normal. Prophylactic                        Antibiotics: The patient does not require prophylactic                        antibiotics. Prior Anticoagulants: The patient has taken                        no previous anticoagulant or antiplatelet agents. ASA                        Grade Assessment: III - A patient with severe systemic                        disease. After reviewing the risks and benefits, the                        patient was deemed in satisfactory condition to undergo                        the procedure. The anesthesia plan was to use moderate                        sedation / analgesia (conscious sedation). Immediately                        prior to administration of medications, the patient was                        re-assessed for adequacy to receive sedatives. The heart                         rate, respiratory rate,  oxygen saturations, blood                        pressure, adequacy of pulmonary ventilation, and                        response to care were monitored throughout the                        procedure. The physical status of the patient was                        re-assessed after the procedure.                       - Airway Examination: Mallampati Class IV (tongue                        obstructs view of the soft palate).                       After obtaining informed consent, the endoscope was                        passed under direct vision. Throughout the procedure,                        the patient's blood pressure, pulse, and oxygen                        saturations were monitored continuously. The Endoscope                        was introduced through the mouth, with the intention of                        advancing to the duodenum. The scope was advanced to the                        middle third of the esophagus before the procedure was                        aborted. Med ications were given. The procedure was                        aborted due to presence of food. The upper GI endoscopy                        was accomplished without difficulty. The patient                        tolerated the procedure fairly well.                                                                                   Findings:       Food was found in the middle third of the esophagus. Biopsies forceps        were used to attempt to dislodge the food enough to pass. A rothnet was        attempted but could not encompass bolus.                                                                                   Impression:          - The procedure was aborted due to presence of food.                       - Food in the middle third of the esophagus. Unable to                        be removed.  Recommendation:      - Transport patient to OR for intubation for airway                         protection                       - Repeat EGD in the OR.                                                                                      Electronically signed by: Herberth Castrejon MD  _____________________  Herberth Castrejon,   11/26/2017 8:05:27 PM  I was physically present for the entire viewing portion of the exam.  __________________________  Signature of teaching physician  [id number] Castrejon    _____________________  Mello Styles MD  Number of Addenda: 0    Note Initiated On: 11/26/2017 6:23 PM  Scope In:  Scope Out:      Mello Parikh MD     11/26/2017 10:19 PM  EGD performed.     Findings  Large food bolus (chicken meat) found in the mid esophagus.   Enough was removed by gallego net/forceps/snare to allow endoscope   to push rest of the food bolus into stomach.   Stricture in at GE junction, likely 2/2 to reflux. Scope passed.  Moderate size hiatal hernia.   Stomach had few small polyps, benign appearing.   Duodenum normal.     Recommendations  - BID PPi  - Clear liquid diet, may advance to strict soft diet as   tolerated.   - Anti reflux life style.   - Repeat EGD in 3-4 weeks for dilation of esophageal stricture   (GI will coordinate).    Mello Styles MD   GI Fellow         Glucose by meter   Result Value Ref Range    Glucose 70 70 - 99 mg/dL   Glucose by meter   Result Value Ref Range    Glucose 165 (H) 70 - 99 mg/dL   Glucose by meter   Result Value Ref Range    Glucose 139 (H) 70 - 99 mg/dL   Social Work IP Consult    Narrative    Joceline Dunbar MSW     11/27/2017 10:21 AM  Social Work Services Progress Note    Hospital Day: 2  Date of Initial Social Work Evaluation:  N/A  Collaborated with:  Pt, NP, RN    Data:  SW was consulted for Pt who is being discharged home from   the Observation unit and is in need of transportation assistance.   Pt has bilateral LE amputations and requires a w/c transport.    Intervention:  Writer met with Pt at bedside. Pt has    transportation services through [employer]:   [phone]. Upon calling the transportation service, Sierra   with [employer] stated that they do not provide wheelchairs for   transport. Pt needs a w/c as hers was left at home when she   transported via stretcher to the hospital. Writer contacted   [facility] and arranged a w/c transport for 10:45am. Pt is in   agreement with [facility] transport. She lives in an elevator   building and has her keys. Writer informed NP and RN of the   [facility] ETA.    Assessment:  SW assisted in arranging transport for Pt who is   discharged.    Plan:    Anticipated Disposition:  Home, no needs identified    Barriers to d/c plan:  None    Follow Up:  Pt will follow-up as directed.      Joceline Dunbar, [facility]  Emergency Department   Pager: [phone]       *Note: Due to a large number of results and/or encounters for the requested time period, some results have not been displayed. A complete set of results can be found in Results Review.      No results found for this or any previous visit (from the past 48 hour(s)).             Pending Results:   Unresulted Labs Ordered in the Past 30 Days of this Admission     No orders found for last 61 day(s).                  Discharge Instructions and Follow-Up:     Discharge Procedure Orders  Reason for your hospital stay   Order Comments: Ms. Foote,  You underwent EGD under conscious sedation to remove food stuck in your throat.  You were watched overnight for clinical monitoring.  Gastroenterology wants you NOT eat beyond your mechanical soft diet order  Continue your Protonix daily  Follow up with outpatient GI in 2-3 weeks for repeat EGD  Follow up with your primary care physician in one week.  Return to the emergency department for fever, chest pain, shortness of breath, worsening symptoms or concerns.     Activity   Order Comments: Your activity upon discharge: activity as tolerated   Order Specific Question  Answer Comments   Is discharge order? Yes      When to contact your care team   Order Comments: Return to the emergency department for chest pain, shortness of breath, unable to keep down antibiotics, fever, worsening symptoms or concerns     Adult [facility] Follow-up and recommended labs and tests   Order Comments: Follow up with primary care provider, Allan Casey, within 7 days for hospital follow- up.  No follow up labs or test are needed.    Follow up with outpatient GI clinic in 2-3 weeks for repeat EGD    Appointments on [place] and/or [facility] (with [facility] or [facility] provider or service). Call [phone] if you haven't heard regarding these appointments within 7 days of discharge.     Full Code     Diet   Order Comments: Follow this diet upon discharge: Orders Placed This Encounter     Advance Diet as Tolerated:Mechanical/Dental Soft Diet   Order Specific Question Answer Comments   Is discharge order? Yes         Discussed case with Dr. Prieto.    Attestation:  VICTOR M Sprague, CNP  ED Observation Unit  [facility]

## 2017-11-27 NOTE — OR NURSING
Jovanni Humphrey [phone] left hospital and plans to return tomorrow 11/27/17. Please call if emergency occurs in operating room. Patient from [facility] RN phone line [phone]. Patient did not have w/c to Pre op area- pt states came to hospital via ambulance. W/c at [facility]. [facility] to arrange ride home for patient when being d/c. Use RN phone line listed above.

## 2017-11-27 NOTE — PROCEDURES
EGD performed.     Findings  Large food bolus (chicken meat) found in the mid esophagus. Enough was removed by gallego net/forceps/snare to allow endoscope to push rest of the food bolus into stomach.   Stricture in at GE junction, likely 2/2 to reflux. Scope passed.  Moderate size hiatal hernia.   Stomach had few small polyps, benign appearing.   Duodenum normal.     Recommendations  - BID PPi  - Clear liquid diet, may advance to strict soft diet as tolerated.   - Anti reflux life style.   - Repeat EGD in 3-4 weeks for dilation of esophageal stricture (GI will coordinate).    Mello Styles MD   GI Fellow

## 2017-11-27 NOTE — ANESTHESIA PREPROCEDURE EVALUATION
"  Anesthesia Evaluation     . Pt has had prior anesthetic. Type: General, MAC and Regional           ROS/MED HX    ENT/Pulmonary: Comment: [place] - uses \"Pocket talker\" to enhance hearing.      (+)sleep apnea, allergic rhinitis, vocal cord abnormalities- Horseness, tobacco use (Quit 15 years ago.  Smoked 1-2 PPD for 30 years.), Current use 3 cig per day packs/day  Moderate Persistent Last exacerbation: > 1year ago,Treatment: Inhaled steroids, Nebulizer daily and Inhaler prn,  moderate COPD, O2 dependent, during Nighttime 2L with CPAP liters/min,  uses CPAP on Oxygen 2L at night cmH2O , . .   : Wears corrective lenses.  Cannot see out of left eye.  Limited right eye vision with no peripheral vision. Legally blind.    Neurologic:     (+)neuropathy - hands, last seizure:  Petit Mal 4-5  time per day.  Grand Mal - last 2005. features: \"stops talking and forgets what was said\", CVA (Cerebral vascular dz,s/p stent to left carotid artery 2005.) date: 2005, 2007 & 2008 with deficits- Presented with left side weakness and peripheral vision loss.    , other neuro Legally blind (no peripheral vision)  RLS. BPPV.  Chronic back pain    Cardiovascular: Comment: PVD and thrombosis of LLE, s/p left AKA 6/6/2016   PVD RLE, s/p right AKA 11/23/2016.  Left carotid stent.  Carotid US 10/2017: < 50% bilat stenosis.       (+) Dyslipidemia, hypertension-range: variable, Peripheral Vascular Disease-- Carotid Stenosis, CAD, -past MI (Inferior MI 9/2016.),-stent (s/p COLLEEN to pRCA and mRCA),9/2016   2 Drug Eluting Stent .. Taking blood thinners Pt has received instructions: Instructions Given to patient: stay on ASA. CHF (Plavix started September 2016 post stent placement.   ASA daily.) etiology: diastolic heart failure Last EF: 80%  date: 10/2017 . . :. . Previous cardiac testing Echodate:see belowresults:Stress Testdate: results:ECG reviewed date: results:Cath date: results:         (-) angina, BEDOYA and angina   METS/Exercise Tolerance: Comment: " Patient lives in assisted living.  Is able to dress herself, but does need help with showering. 1 - Eating, dressing   Hematologic:     (+) History of blood clots (Left LE  prior to AKA.) pt is not anticoagulated, Anemia, History of Transfusion no previous transfusion reaction Other Hematologic Disorder-Sickle cell trait      Musculoskeletal: Comment: DDD - severe  Chronic low back pain  (+) arthritis (hands), , , -       GI/Hepatic: Comment: Chronic dysphagia   Esophageal strictures and previous dilations.    (+) Asymptomatic on medication,      Acute appendicitis: s/p appendectomy. Cholecystitis/cholelithiasis: s/p cholecystectomy.   Renal/Genitourinary:     (+) Other Renal/ Genitourinary, Frequent UTI's.  Functional incontinence      Endo:     (+) type II DM Last HgA1c: 4.5 date: 8/2017 Not using insulin - not using insulin pump Normal glucose range: 140-200 (trending up couple months) not previously admitted for DM/DKA Diabetic complications: neuropathy, .      Psychiatric:     (+) psychiatric history anxiety, depression and other (comment) (Schizoaffective disorder)      Infectious Disease: Comment: 99.2 (low grade fever) on 11/6/17  (+) Recent Fever,       Malignancy:      - no malignancy   Other: Comment: Chronic pain from neuropathy and PVD  Intrathecal pump with bupivacaine and fentanyl located in right lower back.   (+) No chance of pregnancy C-spine cleared: N/A, H/O Chronic Pain,H/O chronic opiod use , other significant disability Wheelchair bound and Blind                 Procedure: Procedure(s):  Esophagogastroduodenoscopy - Wound Class: II-Clean Contaminated    HPI: Sonya Foote is a 67 year old female who presents for the above procedure with Dr. Castrejon.     PMHx/PSHx:  Past Medical History:   Diagnosis Date     Anemia      Antiplatelet or antithrombotic long-term use      Arthritis      AS (sickle cell trait) (H) 10/8/2013     Blind left eye      BPPV (benign paroxysmal positional vertigo)       Chronic back pain      COPD (chronic obstructive pulmonary disease) (H)      Coronary artery disease      CVA (cerebral infarction) 10/8/2012    x3, residual left sided weakness     Diastolic CHF (H) 9/4/2012     Dilantin toxicity 5/31/2013     Esophageal candidiasis (H)      GERD (gastroesophageal reflux disease)      Heart attack      Hernia, abdominal      History of blood transfusion     x5     History of thrombophlebitis      HTN (hypertension) 9/4/2012     Hyperlipidemia LDL goal <100 9/4/2012     Legally blind in right eye, as defined in [place]     No periphal vision right eye     Osteoporosis 1/21/2013     Other chronic pain      PAD (peripheral artery disease) (H)      Palpitations      Person who has had sex change operation 1/20/2014     Pneumonia      Schizoaffective disorder (H)      Seizure disorder (H) 9/4/2012     Sleep apnea     CPAP     Thrombosis of leg      Type 2 diabetes, HbA1C goal < 8% (H) 9/4/2012     Uncomplicated asthma      Unspecified cerebral artery occlusion with cerebral infarction        Past Surgical History:   Procedure Laterality Date     AMPUTATE LEG ABOVE KNEE Left 6/11/2016    Procedure: AMPUTATE LEG ABOVE KNEE;  Surgeon: Mello Rodriguez MD;  [facility]     AMPUTATE LEG BELOW KNEE Right 11/7/2016    Procedure: AMPUTATE LEG BELOW KNEE;  Surgeon: Savannah Durant MD;  [facility]     AMPUTATE REVISION STUMP LOWER EXTREMITY Right 11/11/2016    Procedure: AMPUTATE REVISION STUMP LOWER EXTREMITY;  Surgeon: Savannah Durant MD;  [facility]     AMPUTATE REVISION STUMP LOWER EXTREMITY Right 11/16/2016    Procedure: AMPUTATE REVISION STUMP LOWER EXTREMITY;  Surgeon: Savannah Durant MD;  [facility]     AMPUTATE TOE(S) Right 1/5/2016    Procedure: AMPUTATE TOE(S);  Surgeon: Mello Gaines DPM;  Location:  [facility]     ANGIOGRAM Bilateral 11/21/2014    Procedure: ANGIOGRAM;  Surgeon: Savannah Durant MD;  [facility]     ANGIOGRAM Left 1/16/2015    Procedure: ANGIOGRAM;   Surgeon: Savannah Durant MD;  [facility]     ANGIOGRAM Bilateral 9/14/2015    Procedure: ANGIOGRAM;  Surgeon: Savannah Durant MD;  [facility]     ANGIOGRAM Left 10/12/2015    Procedure: ANGIOGRAM;  Surgeon: Savannah Durant MD;  [facility]     ANGIOGRAM Right 6/6/2016    Procedure: ANGIOGRAM;  Surgeon: Savannah Durant MD;  [facility]     ANGIOPLASTY Right 6/6/2016    Procedure: ANGIOPLASTY;  Surgeon: Savannah Durant MD;  [facility]     APPENDECTOMY       BREAST SURGERY      right breast bx (benign)     CHOLECYSTECTOMY       COLONOSCOPY N/A 8/25/2014    Procedure: COLONOSCOPY;  Surgeon: Mello Ferrer MD;  [facility]     COLONOSCOPY WITH CO2 INSUFFLATION N/A 8/20/2014    Procedure: COLONOSCOPY WITH CO2 INSUFFLATION;  Surgeon: Duane, William Charles, MD;  [facility]: [facility]     ENDARTERECTOMY FEMORAL  5/23/2014    Procedure: ENDARTERECTOMY FEMORAL;  Surgeon: Jason Joshi MD;  [facility]     ESOPHAGOSCOPY, GASTROSCOPY, DUODENOSCOPY (EGD), COMBINED  12/14/2012    Procedure: COMBINED ESOPHAGOSCOPY, GASTROSCOPY, DUODENOSCOPY (EGD), BIOPSY SINGLE OR MULTIPLE;  ESOPHAGOSCOPY, GASTROSCOPY, DUODENOSCOPY (EGD), DILATATION ;  Surgeon: Elizabeth Stevenson MD;  Location:  [facility]     ESOPHAGOSCOPY, GASTROSCOPY, DUODENOSCOPY (EGD), COMBINED  12/31/2013    Procedure: COMBINED ESOPHAGOSCOPY, GASTROSCOPY, DUODENOSCOPY (EGD), BIOPSY SINGLE OR MULTIPLE;;  Surgeon: Clemente Lopez MD;  [facility]     ESOPHAGOSCOPY, GASTROSCOPY, DUODENOSCOPY (EGD), COMBINED  4/1/2014    Procedure: COMBINED ESOPHAGOSCOPY, GASTROSCOPY, DUODENOSCOPY (EGD);;  Surgeon: Clemente Lopez MD;  [facility]     ESOPHAGOSCOPY, GASTROSCOPY, DUODENOSCOPY (EGD), COMBINED  6/28/2014    Procedure: COMBINED ESOPHAGOSCOPY, GASTROSCOPY, DUODENOSCOPY (EGD);  Surgeon: Clemente Lopez MD;  [facility]     ESOPHAGOSCOPY, GASTROSCOPY, DUODENOSCOPY (EGD), COMBINED N/A 8/20/2014    Procedure: COMBINED ESOPHAGOSCOPY, GASTROSCOPY,  DUODENOSCOPY (EGD), BIOPSY SINGLE OR MULTIPLE;  Surgeon: Duane, William Charles, MD;  [facility]     ESOPHAGOSCOPY, GASTROSCOPY, DUODENOSCOPY (EGD), COMBINED N/A 8/22/2014    Procedure: COMBINED ESOPHAGOSCOPY, GASTROSCOPY, DUODENOSCOPY (EGD), BIOPSY SINGLE OR MULTIPLE;  Surgeon: Mello Ferrer MD;  [facility]     ESOPHAGOSCOPY, GASTROSCOPY, DUODENOSCOPY (EGD), COMBINED N/A 10/2/2014    Procedure: COMBINED ESOPHAGOSCOPY, GASTROSCOPY, DUODENOSCOPY (EGD), BIOPSY SINGLE OR MULTIPLE;  Surgeon: Remy Haskins MD;  [facility]     ESOPHAGOSCOPY, GASTROSCOPY, DUODENOSCOPY (EGD), COMBINED Left 12/15/2014    Procedure: COMBINED ESOPHAGOSCOPY, GASTROSCOPY, DUODENOSCOPY (EGD), BIOPSY SINGLE OR MULTIPLE;  Surgeon: Remy Haskins MD;  [facility]     ESOPHAGOSCOPY, GASTROSCOPY, DUODENOSCOPY (EGD), COMBINED N/A 2/25/2015    Procedure: COMBINED ENDOSCOPIC ULTRASOUND, ESOPHAGOSCOPY, GASTROSCOPY, DUODENOSCOPY (EGD), FINE NEEDLE ASPIRATE/BIOPSY;  Surgeon: Clemente Lugo MD;  [facility]     ESOPHAGOSCOPY, GASTROSCOPY, DUODENOSCOPY (EGD), COMBINED Left 2/25/2015    Procedure: COMBINED ESOPHAGOSCOPY, GASTROSCOPY, DUODENOSCOPY (EGD), BIOPSY SINGLE OR MULTIPLE;  Surgeon: Clemente Lugo MD;  [facility]     ESOPHAGOSCOPY, GASTROSCOPY, DUODENOSCOPY (EGD), COMBINED N/A 9/25/2016    Procedure: COMBINED ESOPHAGOSCOPY, GASTROSCOPY, DUODENOSCOPY (EGD);  Surgeon: Aziza Patiño MD;  [facility]     ESOPHAGOSCOPY, GASTROSCOPY, DUODENOSCOPY (EGD), COMBINED N/A 1/18/2017    Procedure: COMBINED ESOPHAGOSCOPY, GASTROSCOPY, DUODENOSCOPY (EGD), BIOPSY SINGLE OR MULTIPLE;  Surgeon: Clemente Lopez MD;  [facility]     FASCIOTOMY LOWER EXTREMITY Left 6/10/2016    Procedure: FASCIOTOMY LOWER EXTREMITY;  Surgeon: Mello Rodriguez MD;  [facility]      CAPSULE ENDOSCOPY N/A 8/25/2014    Procedure: CAPSULE/PILL CAM ENDOSCOPY;  Surgeon: Remy Haskins MD;  Location: [facility]  CAPSULE ENDOSCOPY N/A 10/2/2014    Procedure: CAPSULE/PILL CAM ENDOSCOPY;  Surgeon: Remy Haskins MD;  Location:  GI     ORTHOPEDIC SURGERY      broken wrist repair     SEX TRANSFORMATION SURGERY, MALE TO FEMALE      1974     SINUS SURGERY      cyst removed     TONSILLECTOMY       VASCULAR SURGERY      Left carotid stent         Current Facility-Administered Medications on File Prior to Encounter:  neostigmine (PROSTIGMINE) injection   glycopyrrolate (ROBINUL) injection     Current Outpatient Prescriptions on File Prior to Encounter:  metFORMIN (GLUCOPHAGE-XR) 500 MG 24 hr tablet Take 1 tablet (500 mg) by mouth daily (with dinner)   brimonidine (ALPHAGAN) 0.2 % ophthalmic solution Place 1 drop into the right eye 3 times daily (Patient taking differently: Place 1 drop into the right eye 2 times daily )   dorzolamide-timolol (COSOPT) 2-0.5 % ophthalmic solution Place 1 drop into the right eye 2 times daily   sucralfate (CARAFATE) 1 GM tablet Take 1 tablet (1 g) by mouth 4 times daily May dissolve in 10 mL water is necessary. (Start upon completion of carafate suspension)   escitalopram (LEXAPRO) 5 MG tablet Take 2 tablets (10 mg) by mouth daily   albuterol (PROAIR HFA/PROVENTIL HFA/VENTOLIN HFA) 108 (90 BASE) MCG/ACT Inhaler Inhale 2 puffs into the lungs every 6 hours as needed for shortness of breath / dyspnea or wheezing   umeclidinium (INCRUSE ELLIPTA) 62.5 MCG/INH oral inhaler Inhale 1 puff into the lungs daily   ONDANSETRON PO Take 4 mg by mouth 3 times daily   cyclobenzaprine (FLEXERIL) 10 MG tablet Take 1 tablet (10 mg) by mouth 2 times daily as needed for muscle spasms   lidocaine (LIDODERM) 5 % Patch APPLY 1 TO 3 PATCHES TO PAINFUL AREA AT ONCE FOR UP TO 12 HOURS WITHIN A 24 HOUR PERIOD REMOVE AFTER 12 HOURS   blood glucose monitoring (ONE TOUCH ULTRASOFT) lancets Use to test blood sugar 3 times daily or as directed.   blood glucose monitoring (ONE TOUCH ULTRA) test strip Use to test blood sugars 3  times daily or as directed.   metoprolol (TOPROL-XL) 25 MG 24 hr tablet TAKE 1 TABLET (25 MG) BY MOUTH DAILY   traZODone (DESYREL) 100 MG tablet Take 1.5 tablets (150 mg) by mouth At Bedtime   order for DME Full electric hospital bed with half railsDx: F02231, I110, H317Qhxomd of need: lifetime   order for DME Wheel Chair Cushion: 18 x 18 inch Roho cushion   order for DME Hospital bed with side rails   polyethylene glycol (MIRALAX/GLYCOLAX) Packet Take 17 g by mouth daily as needed Dissolved in water or juice    ACETAMINOPHEN PO Take 1,000 mg by mouth every 6 hours as needed for fever Taking q4-6 hours, per MAR   pregabalin (LYRICA) 75 MG capsule Take 2 capsules (150 mg) by mouth 2 times daily   cetirizine (ZYRTEC) 10 MG tablet Take 1 tablet (10 mg) by mouth daily as needed for allergies   diclofenac (VOLTAREN) 1 % GEL topical gel Apply 2 g topically 4 times daily to hands   EPINEPHrine (EPIPEN JR) 0.15 MG/0.3ML injection Inject 0.3 mLs (0.15 mg) into the muscle as needed for anaphylaxis   lisinopril (PRINIVIL/ZESTRIL) 10 MG tablet Take 1 tablet (10 mg) by mouth daily   pantoprazole (PROTONIX) 40 MG EC tablet Take 40 mg by mouth daily   risperiDONE (RISPERDAL) 0.5 MG tablet Take 0.5 mg by mouth At Bedtime   ferrous sulfate (IRON) 325 (65 FE) MG tablet Take 1 tablet (325 mg) by mouth 2 times daily With meals   order for DME Equipment being ordered: CPAP supplies mask, hose, filters, cushionfax [facility] at [phone]   order for DME Equipment being ordered: CPAP supplies mask, hose, filters, cushion fax to [facility] at [phone]: 10 Device Refills: prn Class: Local Print Start: 2/10/2017   order for DME Equipment being ordered: Nebulizer and tubing supplies   albuterol (2.5 MG/3ML) 0.083% neb solution INHALE 1 VIAL VIA NEBULIZER EVERY 6 HOURS AS NEEDED   CYANOCOBALAMIN PO Take 2,000 mcg by mouth daily   Nutritional Supplements (RENÉE) PACK Take 1 packet by mouth 2 times daily   fluticasone  (FLONASE) 50 MCG/ACT nasal spray Spray 1 spray into both nostrils daily   ADVAIR DISKUS 250-50 MCG/DOSE diskus inhaler Inhale 1 puff into the lungs 2 times daily    calcium citrate-vitamin D (CITRACAL) 315-250 MG-UNIT TABS Take 2 tablets by mouth daily   hydrochlorothiazide (MICROZIDE) 12.5 MG capsule Take 1 capsule (12.5 mg) by mouth daily (Patient taking differently: Take 12.5 mg by mouth daily Hold for sbp<90)   estradiol (ESTRACE) 1 MG tablet Take 1 tablet (1 mg) by mouth daily   levETIRAcetam (KEPPRA) 500 MG tablet Take 1 tablet (500 mg) by mouth 2 times daily   aspirin EC 81 MG EC tablet Take 1 tablet (81 mg) by mouth daily (Patient taking differently: Take 81 mg by mouth every morning )   blood glucose monitoring (ONE TOUCH ULTRA 2) meter device kit Use to test blood sugars 3 times daily or as directed.   phenytoin 200 MG CAPS Take 200 mg by mouth 2 times daily (Patient taking differently: Take 200 mg by mouth 2 times daily (takes at 8 AM and 8 PM))   dorzolamide (TRUSOPT) 2 % ophthalmic solution Place 1 drop into both eyes 2 times daily    zinc 50 MG TABS Take 1 tablet by mouth daily   nitroglycerin (NITROSTAT) 0.4 MG SL tablet Place 1 tablet (0.4 mg) under the tongue every 5 minutes as needed for chest pain if you are still having symptoms after 3 doses (15 minutes) call 911.   MEDICATION GIVEN BY INTRATHECAL PUMP - INSTRUCTION continuous May 19, 2017 - per [facility] in [place] [phone]:Conc: Bupivacaine 20 mg/mL and Fentanyl 2000 mcg/mL.Continuous: Bupivacaine 7.265 mg/day and Fentanyl 726.5 mcg/day.Boluses: Up to 7 boluses per 24-hr period of Bupivicaine 0.599 mg and Fentanyl 59.9 mcgPump Refill Date at [facility]: 7/2/17   latanoprost (XALATAN) 0.005 % ophthalmic solution Place 1 drop into both eyes At Bedtime   atorvastatin (LIPITOR) 40 MG tablet Take 1 tablet (40 mg) by mouth daily (Patient taking differently: Take 40 mg by mouth At Bedtime )   order for DME  Equipment being ordered: Glucerna daily shakes with each meal   prochlorperazine (COMPAZINE) 10 MG tablet Take 1 tablet (10 mg) by mouth every 8 hours as needed   ORDER FOR DME Equipment being ordered: Power Wheelchair   ORDER FOR DME Equipment being ordered: Depends briefs   Cholecalciferol (VITAMIN D) 2000 UNITS tablet Take 2,000 Units by mouth daily.   Multiple Vitamin (MULTIVITAMIN OR) Take 1 tablet by mouth daily        Social Hx:   Social History   Substance Use Topics     Smoking status: Current Every Day Smoker     Packs/day: 2.50     Years: 30.00     Types: Cigarettes, Cigars     Last attempt to quit: 11/1/2000     Smokeless tobacco: Never Used      Comment: Smoking 3 cig per day with each meal.      Alcohol use No       Allergies:   Allergies   Allergen Reactions     Bee Venom      Penicillins Anaphylaxis     Other reaction(s): Skin Rash and/or Hives     Dilantin [Phenytoin] Other (See Comments)     Generic dilantin only per pt     Iodide Hives     09/11/15: Pt states she can use iodine and doesn't have any problems      Iodine-131      Novocaine [Procaine] Hives     Other reaction(s): Skin Rash and/or Hives     Tositumomab          NPO Status: Per ASA Guidelines    Labs:    Blood Bank:  Lab Results   Component Value Date    ABO O 12/07/2016    RH  Pos 12/07/2016    AS Neg 12/07/2016     BMP:  Recent Labs   Lab Test  11/26/17   1618   NA  143   POTASSIUM  3.8   CHLORIDE  108   CO2  28   BUN  9   CR  0.59   GLC  76   CAROL  9.2     CBC:   Recent Labs   Lab Test  11/26/17   1618   WBC  10.0   RBC  4.28   HGB  11.5*   HCT  36.5   MCV  85   MCH  26.9   MCHC  31.5   RDW  16.2*   PLT  288     Coags:  Recent Labs   Lab Test  05/31/17   1024  12/25/16 2010 06/17/16   0523   INR  1.16*  1.35*   < >  1.25*   PTT   --   32   < >   --    FIBR   --    --    --   786*    < > = values in this interval not displayed.           Physical Exam  Normal systems: cardiovascular and pulmonary    Airway   Mallampati: II  TM  distance: >3 FB  Neck ROM: full    Dental   (+) upper dentures and lower dentures  Comment: edentulous    Cardiovascular       Pulmonary    breath sounds clear to auscultation    Other findings: INTRATHECAL PUMP right lower back.    EKG 10/8/17:  NSR with LVH    US CAROTID BILATERAL 10/2017:  1. Right carotid:  less than 50% stenosis   2. Left carotid stent: Patent left internal carotid artery stent with less than 50% stenosis      Yancy stress test 10/2/17:  Impression  1.  Myocardial perfusion imaging using single isotope technique  demonstrated a small, basal inferior nontransmural infarction and a  very small area of mild ischemia involving the distal inferolateral  wall and apex.   2. Gated images demonstrated mild basal inferior hypokinesis.  The  left ventricular systolic function is normal, with an ejection  fraction measured at 80%.  3. No previous study available for comparison     Echocardiogram 12/22/2016  Interpretation Summary  Left ventricular size is normal.  The Ejection Fraction is estimated at 35-40%.  Inferior wall akinesis is present.  Septal,basal to mid anterior wall akinesis.  Right ventricular function, chamber size, wall motion, and thickness are  normal.  Compared to prior study,the wall motion abnormality is new.    Cardiac catheterization 9/17/2016  Diagnosis  1. 1-vessel coronary artery disease (RCA), without left main lesion.  2. Successful angioplasty (COLLEEN) of the proximal RCA.  3. Successful angioplasty (COLLEEN) of the mid RCA.                   PAC Discussion and Assessment    ASA Classification: 3  Case is suitable for: Ola  Anesthetic techniques and relevant risks discussed: GA  Invasive monitoring and risk discussed:   Types:   Possibility and Risk of blood transfusion discussed:   NPO instructions given:   Additional anesthetic preparation and risks discussed:   Needs early admission to pre-op area:   Other:     PAC Resident/NP Anesthesia Assessment:  PAC referral for risk  assessment and optimization of anesthesia with comorbid conditions of:  CAD, s/p inferior MI with COLLEEN to pRCA & mRCA 9/17/2016; HTN; HLD; PVD, s/p bilateral AKA;  h/o LLE blood clot prior to amputation; sickle cell trait; h/o anemia;h/o transfusion (hives associated with); JOSE; COPD; asthma; GERD; dysphagia with history of esophageal stricture, s/p dilatiion, NIDDM;neuropahty; chronic low back pain; DDD; chronic pain with intrathecal pump; seizure disorder; h/o CVA, s/p stent to left carotid; depression/anxiety; and schizoaffective disorder.     + Transgender. 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  11 % risk of major adverse cardiac event. METS ~1.  Patient is WC bound >> is able to dress herself.       - CAD, s/p inferior MI with COLLEEN to pRCA and mRCA 9/17/2017.  On ASA.  Completed 12 mo plavix.   Discussed with Dr. Anderson, cardiology. Safe to stay off Plavix despite mildly abnormal Yancy scan.  Instructed to stay on ASA.        - HTN, take beta blocker DOS.  Hold ACE-I and diuretic DOS.         - HLD, take statin as prescribed DOS       - Cerebral vascular disease, h/o CVA X3  with residual of left eye blindness.  Status post stent to left carotid.  Carotid US less than 50% stenosis bilat.        - Heart failure - EF 35-40% (12/2016)--> Yancy 10/2017 EF 80%       - PVD, s/p AKA left 6/2016 and right 11/2016  Pulmonary - remote smoking history, ~ 40 pack years       - JOSE, uses CPAP with O2 at night.  Will bring with DOS       - COPD/asthma, stable on scheduled inhalers.  Use inhalers DOS and bring to hospital       - 06/11/16; 1138; Mask Ventilation: Easy; Ease of Intubation: Easy; Airway Size: 7;  Cuffed;  Oral;  Blade Type: Sweet;  Blade Size: 2;  Insertion Attempts: 1;  Secured at (cm)to lip:  22 cm;  Breath Sounds:   Equal, clear and bilateral;  End Tidal CO2:  "Present;  Dentition: Intact;  Grade View of Cords: 1  Hematology - h/o anemia. Take FeSO4. Denies hives with blood transfusions (previously reported).        - Sickle Cell trait  GI - GERD, take PPI  DOS and hold Carafate       - Esophageal stricture with h/o dilation       - H/O Esophageal candidiasis, last EGD 1/2017       - Risk of PONV score = 2.  If > 2, anti-emetic intervention recommended.  Renal - Cr 0.5, GFR>90  Endocrine - NIDDM, diet managed.  HgbA1C 4.5.  Musculoskeletal - Recommend careful positioning given chronic pain.   Neuro - Seizure disorder, take Keppra and phenytoin.  Petit mal seizures daily (forgets what is being said and \"spaces out\").  Grand Mal >10years ago.       - Neuropathy, take pregabalin DOS        - Depression/anxiety, take antidepressants DOS       - Schizoaffective disorder       - Cont. Antiepileptics:  Dilantin and Keppra,     Phenytoin Level: 11.7    Phenytoin Free: 0.94 (L)    HEENT - Legally blind (no peripheral vision).  Edentulous.  Limited neck ROM.   - plan for suprapubic tube placement for functional urinary incontinence.  St. Cath for UC today.  + UA, leukocytosis.  Urology notified.     **Chronic Pain:  Has intrathecal pump with Fentanyl, MS and Bupivacaine.  See Pharmacy note.   DNR - confirmed.           Mid-Level Provider/Resident:   Date:   Time:     Attending Anesthesiologist Anesthesia Assessment:  STAFF:   History summarized above.  Relevant Issues are:  1. Complex cardiac hx with prior MI and COLLEEN, one year on Plavix and ASA completed. Plavix is now stopped.   2. Repeat LEXISCAN showed very minor apical ischemia (October, 2017), but patient is asymptomatic. LV function is back to normal.  3. Bilateral leg amputations  4. Seizure disorder controlled on Keppra and Dilantin  5. Sickle Cell TRAIT  6. Ambiguous genitalia-- raised and lives as woman  7. Back smoking a few cigarettes a day; smoking cessation advised.   EXAM = edentulous; no JVD  Instructions given and " questions answered.   Final plans by anesthesiology team on DOS.   ---rcp      Reviewed and Signed by PAC Anesthesiologist  Anesthesiologist: maximo  Date: 11/6  Time:   Pass/Fail: Pass  Disposition:     PAC Pharmacist Assessment:  The current plan is for the procedure to be a same day procedure with patient discharging home after the procedure.  The purpose of this note is to verify the patient's home pain regimen.  If the plan changes and the patient is to be admitted to inpatient status postoperatively please consult the inpatient pain management service for specific pain management recommendations (available at [phone] from 8 AM - 3 PM Mon - Fri and available via phone answering service 24/7 at [phone]).      Based on [place] Prescription Monitoring Profile and patient interview:      - OUTPATIENT MEDICATIONS (related to pain management):  -- Long-acting opioid: none  -- Short-acting opioid: none  -- Intrathecal pump:                        Managed by [facility] in [place]                        Provider:  Dr. Trejo                        Pump contains:  Fentanyl 795.9 mcg/day, bupivacaine                7.959 mg/day, morphine  0.7959 mg/day                        Patient administered bolus:  Fentanyl 59.9 mcg,             Bupivacaine 0.599 mg, morphine 0.0599 mg, patient may               use up to 7 activations per day.  Patient averages 2-3                boluses per day.                        Pump last refilled:  10/30/17                        Next refill due:  1/2/18  -- Oral adjuvant(s): Pregabalin  -- Topicals: Diclofenac gel  -- Bowel Regimen: Polyethylene glycol prn       Verbal consent was given by patient to access pharmacy records and [place] Prescription Monitoring Profile: No     If Sonya Foote is admitted the inpatient pain service will follow up on patient after consult is placed and offer further recommendations for pain management.  If immediate assistance is needed please contact  the Inpatient Pain Management Service: Call [phone] after hours, weekends and holidays or page [phone] from 8 AM - 3 PM Mon - Fri.      Pharmacist:   Date:   Time:      Anesthesia Plan      History & Physical Review  History and physical reviewed and following examination; no interval change.    ASA Status:  3 emergent.    NPO Status:  Full stomach    Plan for RSI, General and ETT with Propofol and Intravenous induction. Maintenance will be Balanced.    PONV prophylaxis:  Ondansetron (or other 5HT-3) and Dexamethasone or Solumedrol  Additional equipment: Videolaryngoscope      Postoperative Care  Postoperative pain management:  IV analgesics.      Consents  Anesthetic plan, risks, benefits and alternatives discussed with:  Patient.  Use of blood products discussed: No .   .          - emergent   - GETA with standard ASA monitors, IV induction, balanced anesthetic  - PIVx1  - Antibiotics per surgery  - PONV prophylaxis  - Pain management with Fentanyl/dilaudid boluses    Mariluz Wells MD CA-1                    .

## 2017-11-27 NOTE — OR NURSING
EGD completed.  Removal of food impaction unsuccessful.  Attempted to debulk/remove with gallego net and jumbo biopsy forceps.  ED nurse present to administer sedation and monitor patient.  Pt left in care of ICU nurse.

## 2017-11-28 ENCOUNTER — TELEPHONE (OUTPATIENT)
Dept: GASTROENTEROLOGY | Facility: CLINIC | Age: 67
End: 2017-11-28

## 2017-11-28 NOTE — PROGRESS NOTES
11/28/17: CM confirmed d/c back home - contacted member - straight to  - left message.  CM also called [facility] - left message for nursing to contact CM.     Jamie Sharma RN, PHN  [facility]

## 2017-11-28 NOTE — PROGRESS NOTES
11/27/17: Reviewed [facility] SW notes - plan is for d/c today back to [place].  Member was not inpatient - under observation - no AIDE log needed.   CM attempted to contact member - went straight to .  Left  for member.     Jamie Sharma RN, N  [facility]

## 2017-11-29 ENCOUNTER — TELEPHONE (OUTPATIENT)
Dept: INTERNAL MEDICINE | Facility: CLINIC | Age: 67
End: 2017-11-29

## 2017-11-29 ENCOUNTER — TELEPHONE (OUTPATIENT)
Dept: GASTROENTEROLOGY | Facility: CLINIC | Age: 67
End: 2017-11-29

## 2017-11-29 NOTE — PROGRESS NOTES
11/29/17: CM placed call to member - phone went straight to .  CM placed call to [facility] - spoke with Tammy - she states that member dropped cell in water. Member's son is working on getting member new phone.  Tammy stated that member is out for appt at pain clinic but will leave message for member to call CM.     Jamie Sharma RN, N  [facility]

## 2017-11-29 NOTE — TELEPHONE ENCOUNTER
Fax received from [facility]. Requesting therapeutic interchange request of pantoprazole sodium to be replaced by omeprazole 20 mg cap. Please review and approve or deny change.

## 2017-11-29 NOTE — PROGRESS NOTES
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   She states that she dropped her phone in water and son is coming on Sat to assist her.   Discussed annual HRA visit - scheduled for 12/11 at 1 p.m.   CM also left  for nursing re: visit.     Jamie Sharma RN, PHN  [facility]

## 2017-11-30 ENCOUNTER — TELEPHONE (OUTPATIENT)
Dept: GASTROENTEROLOGY | Facility: CLINIC | Age: 67
End: 2017-11-30

## 2017-12-05 ENCOUNTER — APPOINTMENT (OUTPATIENT)
Dept: CT IMAGING | Facility: CLINIC | Age: 67
End: 2017-12-05
Attending: EMERGENCY MEDICINE
Payer: COMMERCIAL

## 2017-12-05 ENCOUNTER — HOSPITAL ENCOUNTER (OUTPATIENT)
Facility: CLINIC | Age: 67
Setting detail: OBSERVATION
Discharge: HOME | End: 2017-12-06
Attending: EMERGENCY MEDICINE | Admitting: EMERGENCY MEDICINE
Payer: COMMERCIAL

## 2017-12-05 DIAGNOSIS — R00.1 SINUS BRADYCARDIA: ICD-10-CM

## 2017-12-05 DIAGNOSIS — R10.9 ABDOMINAL PAIN, UNSPECIFIED ABDOMINAL LOCATION: ICD-10-CM

## 2017-12-05 DIAGNOSIS — R11.2 NAUSEA AND VOMITING, INTRACTABILITY OF VOMITING NOT SPECIFIED, UNSPECIFIED VOMITING TYPE: ICD-10-CM

## 2017-12-05 DIAGNOSIS — E87.6 HYPOKALEMIA: ICD-10-CM

## 2017-12-05 DIAGNOSIS — N39.0 URINARY TRACT INFECTION WITHOUT HEMATURIA, SITE UNSPECIFIED: Primary | ICD-10-CM

## 2017-12-05 LAB
ALBUMIN SERPL-MCNC: 3.1 G/DL (ref 3.4–5)
ALBUMIN UR-MCNC: 10 MG/DL
ALP SERPL-CCNC: 246 U/L (ref 40–150)
ALT SERPL W P-5'-P-CCNC: 43 U/L (ref 0–50)
ANION GAP SERPL CALCULATED.3IONS-SCNC: 6 MMOL/L (ref 3–14)
APPEARANCE UR: ABNORMAL
AST SERPL W P-5'-P-CCNC: 29 U/L (ref 0–45)
BACTERIA #/AREA URNS HPF: ABNORMAL /HPF
BASOPHILS # BLD AUTO: 0 10E9/L (ref 0–0.2)
BASOPHILS NFR BLD AUTO: 0.3 %
BILIRUB SERPL-MCNC: 0.1 MG/DL (ref 0.2–1.3)
BILIRUB UR QL STRIP: NEGATIVE
BUN SERPL-MCNC: 8 MG/DL (ref 7–30)
CALCIUM SERPL-MCNC: 9.6 MG/DL (ref 8.5–10.1)
CHLORIDE SERPL-SCNC: 104 MMOL/L (ref 94–109)
CO2 BLDCOV-SCNC: 31 MMOL/L (ref 21–28)
CO2 SERPL-SCNC: 31 MMOL/L (ref 20–32)
COLOR UR AUTO: YELLOW
CREAT SERPL-MCNC: 0.46 MG/DL (ref 0.52–1.04)
DIFFERENTIAL METHOD BLD: ABNORMAL
EOSINOPHIL # BLD AUTO: 0.2 10E9/L (ref 0–0.7)
EOSINOPHIL NFR BLD AUTO: 1.3 %
ERYTHROCYTE [DISTWIDTH] IN BLOOD BY AUTOMATED COUNT: 15.4 % (ref 10–15)
GFR SERPL CREATININE-BSD FRML MDRD: >90 ML/MIN/1.7M2
GLUCOSE SERPL-MCNC: 92 MG/DL (ref 70–99)
GLUCOSE UR STRIP-MCNC: NEGATIVE MG/DL
HCT VFR BLD AUTO: 39.7 % (ref 35–47)
HGB BLD-MCNC: 13 G/DL (ref 11.7–15.7)
HGB UR QL STRIP: ABNORMAL
HYALINE CASTS #/AREA URNS LPF: 1 /LPF (ref 0–2)
IMM GRANULOCYTES # BLD: 0 10E9/L (ref 0–0.4)
IMM GRANULOCYTES NFR BLD: 0.2 %
INR PPP: 1.07 (ref 0.86–1.14)
INTERPRETATION ECG - MUSE: NORMAL
KETONES UR STRIP-MCNC: NEGATIVE MG/DL
LACTATE BLD-SCNC: 0.5 MMOL/L (ref 0.7–2.1)
LEUKOCYTE ESTERASE UR QL STRIP: NEGATIVE
LIPASE SERPL-CCNC: 130 U/L (ref 73–393)
LYMPHOCYTES # BLD AUTO: 1.4 10E9/L (ref 0.8–5.3)
LYMPHOCYTES NFR BLD AUTO: 12.1 %
MCH RBC QN AUTO: 27.7 PG (ref 26.5–33)
MCHC RBC AUTO-ENTMCNC: 32.7 G/DL (ref 31.5–36.5)
MCV RBC AUTO: 85 FL (ref 78–100)
MONOCYTES # BLD AUTO: 1.1 10E9/L (ref 0–1.3)
MONOCYTES NFR BLD AUTO: 9.8 %
MUCOUS THREADS #/AREA URNS LPF: PRESENT /LPF
NEUTROPHILS # BLD AUTO: 8.5 10E9/L (ref 1.6–8.3)
NEUTROPHILS NFR BLD AUTO: 76.3 %
NITRATE UR QL: POSITIVE
NRBC # BLD AUTO: 0 10*3/UL
NRBC BLD AUTO-RTO: 0 /100
PCO2 BLDV: 52 MM HG (ref 40–50)
PH BLDV: 7.39 PH (ref 7.32–7.43)
PH UR STRIP: 7.5 PH (ref 5–7)
PLATELET # BLD AUTO: 319 10E9/L (ref 150–450)
PO2 BLDV: 58 MM HG (ref 25–47)
POTASSIUM SERPL-SCNC: 3.3 MMOL/L (ref 3.4–5.3)
PROT SERPL-MCNC: 8.8 G/DL (ref 6.8–8.8)
RBC # BLD AUTO: 4.7 10E12/L (ref 3.8–5.2)
RBC #/AREA URNS AUTO: 2 /HPF (ref 0–2)
SAO2 % BLDV FROM PO2: 89 %
SODIUM SERPL-SCNC: 140 MMOL/L (ref 133–144)
SOURCE: ABNORMAL
SP GR UR STRIP: 1.01 (ref 1–1.03)
SQUAMOUS #/AREA URNS AUTO: 3 /HPF (ref 0–1)
TROPONIN I SERPL-MCNC: <0.015 UG/L (ref 0–0.04)
UROBILINOGEN UR STRIP-MCNC: NORMAL MG/DL (ref 0–2)
WBC # BLD AUTO: 11.2 10E9/L (ref 4–11)
WBC #/AREA URNS AUTO: 4 /HPF (ref 0–2)

## 2017-12-05 PROCEDURE — 83735 ASSAY OF MAGNESIUM: CPT | Performed by: EMERGENCY MEDICINE

## 2017-12-05 PROCEDURE — 81001 URINALYSIS AUTO W/SCOPE: CPT | Performed by: EMERGENCY MEDICINE

## 2017-12-05 PROCEDURE — 25000132 ZZH RX MED GY IP 250 OP 250 PS 637: Performed by: EMERGENCY MEDICINE

## 2017-12-05 PROCEDURE — 83690 ASSAY OF LIPASE: CPT | Performed by: EMERGENCY MEDICINE

## 2017-12-05 PROCEDURE — 85610 PROTHROMBIN TIME: CPT | Performed by: EMERGENCY MEDICINE

## 2017-12-05 PROCEDURE — 25000128 H RX IP 250 OP 636: Performed by: EMERGENCY MEDICINE

## 2017-12-05 PROCEDURE — 96361 HYDRATE IV INFUSION ADD-ON: CPT | Performed by: EMERGENCY MEDICINE

## 2017-12-05 PROCEDURE — 93010 ELECTROCARDIOGRAM REPORT: CPT | Mod: Z6 | Performed by: EMERGENCY MEDICINE

## 2017-12-05 PROCEDURE — 74176 CT ABD & PELVIS W/O CONTRAST: CPT

## 2017-12-05 PROCEDURE — 87186 SC STD MICRODIL/AGAR DIL: CPT | Performed by: EMERGENCY MEDICINE

## 2017-12-05 PROCEDURE — 70450 CT HEAD/BRAIN W/O DYE: CPT

## 2017-12-05 PROCEDURE — 83605 ASSAY OF LACTIC ACID: CPT

## 2017-12-05 PROCEDURE — 85025 COMPLETE CBC W/AUTO DIFF WBC: CPT | Performed by: EMERGENCY MEDICINE

## 2017-12-05 PROCEDURE — 93005 ELECTROCARDIOGRAM TRACING: CPT | Performed by: EMERGENCY MEDICINE

## 2017-12-05 PROCEDURE — 84484 ASSAY OF TROPONIN QUANT: CPT | Performed by: EMERGENCY MEDICINE

## 2017-12-05 PROCEDURE — 82803 BLOOD GASES ANY COMBINATION: CPT

## 2017-12-05 PROCEDURE — 87086 URINE CULTURE/COLONY COUNT: CPT | Performed by: EMERGENCY MEDICINE

## 2017-12-05 PROCEDURE — 99285 EMERGENCY DEPT VISIT HI MDM: CPT | Mod: 25 | Performed by: EMERGENCY MEDICINE

## 2017-12-05 PROCEDURE — 96374 THER/PROPH/DIAG INJ IV PUSH: CPT | Performed by: EMERGENCY MEDICINE

## 2017-12-05 PROCEDURE — 80053 COMPREHEN METABOLIC PANEL: CPT | Performed by: EMERGENCY MEDICINE

## 2017-12-05 RX ORDER — ACETAMINOPHEN 325 MG/1
650 TABLET ORAL ONCE
Status: COMPLETED | OUTPATIENT
Start: 2017-12-05 | End: 2017-12-05

## 2017-12-05 RX ORDER — METOCLOPRAMIDE HYDROCHLORIDE 5 MG/ML
5 INJECTION INTRAMUSCULAR; INTRAVENOUS ONCE
Status: COMPLETED | OUTPATIENT
Start: 2017-12-05 | End: 2017-12-05

## 2017-12-05 RX ORDER — SODIUM CHLORIDE 9 MG/ML
INJECTION, SOLUTION INTRAVENOUS CONTINUOUS
Status: DISCONTINUED | OUTPATIENT
Start: 2017-12-05 | End: 2017-12-06 | Stop reason: HOSPADM

## 2017-12-05 RX ADMIN — METOCLOPRAMIDE 5 MG: 5 INJECTION, SOLUTION INTRAMUSCULAR; INTRAVENOUS at 23:02

## 2017-12-05 RX ADMIN — ACETAMINOPHEN 650 MG: 325 TABLET, FILM COATED ORAL at 23:02

## 2017-12-05 RX ADMIN — SODIUM CHLORIDE 1000 ML: 9 INJECTION, SOLUTION INTRAVENOUS at 21:48

## 2017-12-05 ASSESSMENT — ENCOUNTER SYMPTOMS
BACK PAIN: 0
DIZZINESS: 0
COLOR CHANGE: 0
DIAPHORESIS: 1
BRUISES/BLEEDS EASILY: 0
CONSTIPATION: 0
FREQUENCY: 0
CHILLS: 1
NECK PAIN: 0
NERVOUS/ANXIOUS: 0
LIGHT-HEADEDNESS: 0
SHORTNESS OF BREATH: 0
VOICE CHANGE: 0
WEAKNESS: 0
FEVER: 0
TROUBLE SWALLOWING: 0
NAUSEA: 1
DIARRHEA: 0
PALPITATIONS: 0
ADENOPATHY: 0
NUMBNESS: 0
HEMATURIA: 0
APPETITE CHANGE: 1
SORE THROAT: 0
POLYDIPSIA: 0
VOMITING: 1
ABDOMINAL PAIN: 1
BLOOD IN STOOL: 0
EYE PAIN: 0
DYSPHORIC MOOD: 0
HEADACHES: 1
COUGH: 0

## 2017-12-05 NOTE — IP AVS SNAPSHOT
` ` Patient Information     Patient Name Sex Sonya Randle ([id number]) Female 1950       Room Bed    6519 6519-02      Patient Demographics     Address Phone E-mail Address    [address]  [place] [address] [phone] (Home) *Preferred*  none (Work)  [phone] (Mobile) [email or web address] by [email or web address]      Patient Ethnicity & Race     Ethnic Group Patient Race    American    or       Emergency Contact(s)     Name Relation Home Work Mobile    Kam Carranza [phone]    TAY CARRANZA Relative [phone]      Documents on File        Status Date Received Description       Documents for the Patient    Privacy Notice - [place] Received 12     External Medication Information Consent Accepted 12     Patient ID Received () 12     Consent for Services - Hospital/Clinic Received () 12     Consent to Communicate Received () 12     Insurance Card Received () 12 medicare    HIM DEVYN Authorization - File Only   release for records from dr grace [facility]oo [facility] 12    HIM DEVYN Authorization - File Only   [email or web address] 12    HIM DEVYN Authorization - File Only   release for records from dr patria portillo [facility] 12    HIM DEVYN Authorization - File Only   MyChart Access    Business/Insurance/Care Coordination/Health Form - Patient.2   [facility]: Patient Care Plan 4.19.12    Consent for Services - Hospital/Clinic Received () 12     Other Received 13 pt bill of rights    Consent for EHR Access  13 Copied from existing Consent for services - C/HOD collected on 2012    HIM DEVYN Authorization - File Only   [facility]  3/7/13    [facility] Specified Other       Insurance Card Received () 13     Patient ID Received () 13 ID  CARD    Consent to Communicate Received () 13     Consent for EHR Access Received 13     HIM DEVYN Authorization  13     Consent for Services - Hospital/Clinic Received () 13     Consent for Services - Hospital/Clinic Received () 13     Patient ID Received 16 DL    Privacy Notice - [place] Received 14     HIM DEVYN Authorization  14 MAPS    Consent for Services - [facility] Received 14 imaging center consent    Consent for Services - [facility]  14 GENERAL CONSENT FORM: SHARED EHR - ENGLISH    Advance Directives and Living Will Received 14 HEALTH CARE DIRECTIVE 13    Advance Directives and Living Will Not Received  VALIDATION OF AD 13    Consent for Services - Hospital/Clinic Received () 01/08/15     Insurance Card Received 01/12/15 MEDICARE    Insurance Card Received 16 medica    Consent to Communicate Received 05/14/15 EMAIL    Consent to Communicate Received 05/14/15 PHONE    Physical Therapy Certification Received 05/18/15 5/14/15 - 8/12/15    Consent for Services - Hospital/Clinic Received () 16     Consent for Services/Privacy Notice - Hospital/Clinic Received () 16     Consent for Services/Privacy Notice - Hospital/Clinic-Esign Received 17     HIM DEVYN Authorization  16     HIM DEVYN Authorization  16 [facility] RADIOLOG[facility]  2016    Business/Insurance/Care Coordination/Health Form - Patient  10/04/16 MEDICA ESTRADIOL 1MG APPROVAL LETTER 16-17    Business/Insurance/Care Coordination/Health Form - Patient  10/06/16 MEDICA NOTICE OF DENIAL FOR HOME CARE/HOME HEALTH AIDE 16    Insurance Card Received 17 MEDICA 2017    Business/Insurance/Care Coordination/Health Form - Patient  17 MEDICA MIRIXA UTILIZATION MANAGEMENT 3/2/2017    Business/Insurance/Care Coordination/Health Form - Patient  17 MEDICA FORMULARY - SPIRIVA CAP HANDIHLR 3/13/17    Consent  for Services - Geriatrics Received 17     Business/Insurance/Care Coordination/Health Form - Patient  17 MEDICA-LIDOCAINE PATCH APPROVAL-17-2/15/18    Consent for Services/Privacy Notice - Hospital/Clinic Received 05/15/17     Business/Insurance/Care Coordination/Health Form - Patient  17 OMNICARE, LIDOCAINE APPROVAL, 10/4/2016 - 10/3/2017    Business/Insurance/Care Coordination/Health Form - Patient  06/15/17 MEDICA - APPROVAL LODICAINE PATCH - 10/4/16-10/3/17    Business/Insurance/Care Coordination/Health Form - Patient  17 MEDICA APPROVED REPLACEMENT 2017-2017    Insurance Card  10/16/17 INSURANCE CARD    Care Everywhere Prospective Auth Received 10/18/17     HIM DEVYN Authorization  () 17 Authorization for batch [employer] 2017    Insurance Card Received (Deleted) 13 UCARE CARD    Consent to Communicate Received (Deleted) 13     Insurance Card Received (Deleted) 03/10/14 ucare    Insurance Card Received (Deleted) 01/08/15     CE Persistent Point of Care Auth Received 17 CE Persistent Point of Care Authorization       Documents for the Encounter    CMS IM for Patient Signature         Admission Information     Attending Provider Admitting Provider Admission Type Admission Date/Time     Hannah Horan MD Emergency 17    Discharge Date Hospital Service Auth/Cert Status Service Area     Emergency Medicine Incomplete [facility] SERVICES    Unit Room/Bed Admission Status       UU U6D OBSERVATION 6519/6519-02 Admission (Confirmed)       Admission     Complaint    Nausea & vomiting      Hospital Account     Name Acct ID Class Status Primary Coverage    Sonya Foote [id number] Observation Open MEDICA - MEDICA DUAL SOL[employer]IONS [facility] PARTNERS            Guarantor Account (for Hospital Account #[id number])     Name Relation to Pt Service Area Active? Acct Type    Sonya Foote Self FCS Yes Personal/Family    Address Phone           [address]  [place] [id number] [phone](H)  none(O)              Coverage Information (for Hospital Account #[id number])     F/O Payor/Plan Precert #    MEDICA/MED[employer]UT[employer] [facility] [place]     Subscriber Subscriber #    Sonya Foote [id number]    Address Phone    [address] [address]  [place] [address] [phone]

## 2017-12-05 NOTE — IP AVS SNAPSHOT
` `     UNIT 6D OBSERVATION [facility]: [phone]                 INTERAGENCY TRANSFER FORM - NOTES (H&P, Discharge Summary, Consults, Procedures, Therapies)   2017                    Hospital Admission Date: 2017  SHARATH MERCEDES   : 1950  Sex: Female        Patient PCP Information     Provider PCP Type    Allan Casey MD General         History & Physicals      H&P by Polly Thomas PA-C at 2017  8:04 AM     Author:  Polly Thomas PA-C Service:  Emergency Medicine Author Type:  Physician Assistant - RODRIGO    Filed:  2017 11:51 AM Date of Service:  2017  8:04 AM Creation Time:  2017  8:04 AM    Status:  Cosign Needed :  Polly Thomas PA-C (Physician Assistant Joe WALLACE)    Cosign Required:  Yes             [facility] ED Observation Admission Note    Chief Complaint   Patient presents with     Nausea & Vomiting       Assessment/Plan:[AT1.1]  Sharath Mercedes is a 67 year old[AT1.2] transgender[AT1.3] female with a history of schizoaffective disorder, gender change[AT1.2] in [AT1.3], DM2, diastolic CHF, asthma, COPD, seizure disorder, hypertension, MI, CVA (x3), bilateral BKA, dysphagia due to esophageal stricture as well as multiple other medical issues who presents via ambulance from her nursing facility for evaluation of nausea and vomiting.[AT1.2]     1. UTI.[AT1.1] UA in ED with + nitrite, 4 WBCs, many bacteria. She admits to dysuria and urinary frequency. Symptoms and lab findings consistent with UTI without complication at this time.[AT1.3] No signs of sepsis as b[AT1.4]lood pressure[AT1.3]s normal, no tachycardia, afebrile, normal lactate.[AT1.4] Urine culture is pending.  She does have a history of recurrent UTIs- f[AT1.3]ollowed by urology as outpatient and long term plan is for suprapubic cather once able to come off anticoagulation. CT negative for stones or hydronephrosis.[AT1.5] She was last admitted 10/8/2017 for UTI secondary to Klebiella  pneumoniae UTI resulting in sepsis. She was discharged on a 5-day course of Levaquin. Patient received 1 dose of Levaquin IV in the ED.   - one more dose IV Levaquin today  - transition to PO Levaquin for discharge  - UC pending[AT1.3]  - IV fluids 125ml/hr[AT1.4]    2. Nausea, vomiting, abdominal pain. LLQ abdominal pain, with diffusely tender abdomen, no focal tenderness. CT in ED was[AT1.3] unremarkable for acute pathology. CMP did show elevated Alk phos at 246, however this was consistent with prior values since 08/14/2017. No evidence for acute biliary obstruction as remainder of labs and imaging normal. No findings consistent with diverticulitis or colitis on CT. Troponin x 1 negative and EKG showed no ischemic changes- less likely cardiac etiology. May be due to constipation as she states she has not had a BM in 3-4 days. Also may be due to UTI as above.   Mildly dry mucous membranes but no signs of severe clinical dehydration as kidney function normal and stable, no tachycardia.[AT1.4]  - Zofran, Reglan PRN nausea/vomiting[AT1.3]  - IV rehydration 125ml/hr[AT1.4]  - serial abdominal exams[AT1.3]  - repeat CBC and CMP qam  - senna-docusate x 1   - Miralax prn constipation[AT1.4]    3. Hypokalemia. 3.3 in ED. Received IV replacement in ED. Magnesium normal.  - repeat[AT1.3] CMP in AM[AT1.4]    4. Sinus bradycardia. In ED, HR 53-68. EKG showed sinus bradycardia. Persistently bradycardic in low 50s on floor. Symptoms are not characteristic of symptomatic bradycardia.  - continuous telemetry  - hold metoprolol    Chronic Medical Issues:[AT1.3]  #Dysphagia 2/2 esophageal strictures s/p previous dilatations[AT1.5]. #[AT1.4]Chronic erosive GERD[AT1.5]  Admitted on 11/26/2017 following food bolus impaction which required upper GI removal. Was started on a PPI and discharged on mechanical soft diet.   - ADAT[AT1.4]  - continue daily Protonix[AT1.3]  - follow up with GI in 3-4 weeks for dilation of esophageal  stricture[AT1.4]    #Allergic rhinitis  #COPD[AT1.5]. Patient complains of chronic shortness of breath, but it is not worse than her baseline. Does admit to cough and yellow sputum production, rhinorrhea. However, lungs were clear, she was moving air well, O2 sats excellent at 96%. Do not think her presentation is consistent with COPD exacerbation, with chief complaint being nausea/vomiting.  Did not bring home inhalers.  - albuterol nebs q4h prn  - Duonebs q4h scheduled  - Pulmicort BID scheduled[AT1.3]    #CAD[AT1.5] with r[AT1.4]ecent NSTEMI 9/2016 s/p stenting, completed 1 year of plavix[AT1.5].[AT1.4] #HFpEF[AT1.5].[AT1.4] #HTN  Stress test 10/2017 showing inferior wall MI without significant ischemia,[AT1.5] Troponin x 1 negative in ED. EKG without ischemic changes. Does not complain of chest pain, shortness of breath worse than her baseline.[AT1.4]  - continue PTA ASA, lisinopril, HCTZ  - hold metoprolol in setting of bradycardia (see above)[AT1.3]  - continuous telemetry[AT1.4]     #PAD s/p bilateral lower extremity amputations[AT1.5].[AT1.6] Multiple previous surgeries. Bilateral femoral stenting in the past.[AT1.5]   -[AT1.6] Continu[AT1.5]e[AT1.6] ASA[AT1.5]      #DM2. Blood sugar 92 in ED, no signs of DKA. Last A1C[AT1.3] was 5.1 on[AT1.6] 11/20/2017.  - continue PTA metformin  - blood glucose checks TID AC  - hypoglycemia protocol[AT1.3]      #Transgende[AT1.5]r  - continue PTA estrogen HRT[AT1.3]      #Chronic pain[AT1.5].[AT1.4] Followed in pain clinic has pain pump which delivers continuous fentanyl as well as a max of 7 bumps daily.  Patient di[AT1.5]d[AT1.6] bring her button to give herself bumps.[AT1.5]  - continue PTA Lyrica  - continue PTA pain regimen with pain pump[AT1.4]         #Restless leg syndrome[AT1.5]  -[AT1.4] Continue[AT1.5] PTA L[AT1.4]yrica      #Depression[AT1.5]. #A[AT1.4]nxiety[AT1.5]. #S[AT1.4]chizoaffective disorder[AT1.5]  - continue PTA escitalopram.[AT1.3]  "    #Insomnia[AT1.5]  - continue PTA Trazodone.    #Seizure disorder  - continue PTA Keppra and phenytoin  - seizure precautions[AT1.3]              Consults:[AT1.1] none[AT1.3]  FEN:[AT1.1] ADAT, IV fluids[AT1.3]  Code Status:[AT1.1] Full[AT1.3]  Disposition[AT1.1]: pending clinical improvement, discharge to assisted living facility[AT1.3]                  HPI:[AT1.1]  Sonya Foote is a 67 year old female with a history of schizoaffective disorder, gender change, DM2, diastolic CHF, asthma, COPD, seizure disorder, hypertension, MI, CVA (x3), bilateral BKA, dysphagia due to esophageal stricture as well as multiple other medical issues who presents via ambulance from her nursing facility for evaluation of nausea and vomiting.    [AT1.2]  Per ED provider note, \"[AT1.6]Patient complains she has been feeling unwell with nausea for the past week, but today had multiple episodes of vomiting in addition to her nausea today. She also complains of hot/cold flashes, as well as a sharp frontal headache intermittently throughout the week, though acutely worse throughout the day today. She is on a soft diet, though has had decreased appetite throughout the week. She reports no changes in bowel movements from her baseline. Additionally, she has noted some dysuria as well as mild lower abdominal pain over the past week. She denies chest pain. She does have chronic shortness of breath secondary to COPD, but states this is unchanged from baseline. No other symptoms or complaints at this time.[AT1.2]\"[AT1.6]    In the ED[AT1.1], VSS. Workup significant for mild leukocytosis at 11.2, mild hypokalemia at 3.3, elevated alk phos (consistent with prior on 11/27/17). UA showed + nitrite, 4 WBCs, many bacteria. Imaging  normal head CT, no acute findings on abdominal/pelvis CT, normal EKG, negative troponin x 1.[AT1.3] She received IV fluids, potassium replacement, and antiemetics in the ED. She was admitted to ED observation for serial exams, " IV rehydration, and antiemetics.[AT1.4] On admission to the observation unit the patient was stable[AT1.1] but did complain of headache[AT1.4] located on the left forehead. She also has ongoing[AT1.6] nausea, and had one episode of emesis in the AM.[AT1.4] States her last BM was 3-4 days ago.[AT1.6]      PCP:[AT1.1] Allan Casey[AT1.4]    History:    Past Medical History:   Diagnosis Date     Anemia      Antiplatelet or antithrombotic long-term use      Arthritis      AS (sickle cell trait) (H) 10/8/2013     Blind left eye      BPPV (benign paroxysmal positional vertigo)      Chronic back pain      COPD (chronic obstructive pulmonary disease) (H)      Coronary artery disease      CVA (cerebral infarction) 10/8/2012    x3, residual left sided weakness     Diastolic CHF (H) 9/4/2012     Dilantin toxicity 5/31/2013     Esophageal candidiasis (H)      GERD (gastroesophageal reflux disease)      Heart attack      Hernia, abdominal      History of blood transfusion     x5     History of thrombophlebitis      HTN (hypertension) 9/4/2012     Hyperlipidemia LDL goal <100 9/4/2012     Legally blind in right eye, as defined in [place]     No periphal vision right eye     Osteoporosis 1/21/2013     Other chronic pain      PAD (peripheral artery disease) (H)      Palpitations      Person who has had sex change operation 1/20/2014     Pneumonia      Schizoaffective disorder (H)      Seizure disorder (H) 9/4/2012     Sleep apnea     CPAP     Thrombosis of leg      Type 2 diabetes, HbA1C goal < 8% (H) 9/4/2012     Uncomplicated asthma      Unspecified cerebral artery occlusion with cerebral infarction        Past Surgical History:   Procedure Laterality Date     AMPUTATE LEG ABOVE KNEE Left 6/11/2016    Procedure: AMPUTATE LEG ABOVE KNEE;  Surgeon: Mello Rodriguez MD;  [facility]     AMPUTATE LEG BELOW KNEE Right 11/7/2016    Procedure: AMPUTATE LEG BELOW KNEE;  Surgeon: Savannah Durant MD;  [facility]     AMPUTATE REVISION  STUMP LOWER EXTREMITY Right 11/11/2016    Procedure: AMPUTATE REVISION STUMP LOWER EXTREMITY;  Surgeon: Savannah Durant MD;  [facility]     AMPUTATE REVISION STUMP LOWER EXTREMITY Right 11/16/2016    Procedure: AMPUTATE REVISION STUMP LOWER EXTREMITY;  Surgeon: Savannah Durant MD;  [facility]     AMPUTATE TOE(S) Right 1/5/2016    Procedure: AMPUTATE TOE(S);  Surgeon: Mello Gaines DPM;  Location:  [facility]     ANGIOGRAM Bilateral 11/21/2014    Procedure: ANGIOGRAM;  Surgeon: Savannah Durant MD;  [facility]     ANGIOGRAM Left 1/16/2015    Procedure: ANGIOGRAM;  Surgeon: Savannah Durant MD;  [facility]     ANGIOGRAM Bilateral 9/14/2015    Procedure: ANGIOGRAM;  Surgeon: Savannah Durant MD;  [facility]     ANGIOGRAM Left 10/12/2015    Procedure: ANGIOGRAM;  Surgeon: Savannah Durant MD;  [facility]     ANGIOGRAM Right 6/6/2016    Procedure: ANGIOGRAM;  Surgeon: Savannah Durant MD;  [facility]     ANGIOPLASTY Right 6/6/2016    Procedure: ANGIOPLASTY;  Surgeon: Savannah Durant MD;  [facility]     APPENDECTOMY       BREAST SURGERY      right breast bx (benign)     CHOLECYSTECTOMY       COLONOSCOPY N/A 8/25/2014    Procedure: COLONOSCOPY;  Surgeon: Mello Ferrer MD;  [facility]     COLONOSCOPY WITH CO2 INSUFFLATION N/A 8/20/2014    Procedure: COLONOSCOPY WITH CO2 INSUFFLATION;  Surgeon: Duane, William Charles, MD;  Location:  OR     ENDARTERECTOMY FEMORAL  5/23/2014    Procedure: ENDARTERECTOMY FEMORAL;  Surgeon: Jason Joshi MD;  [facility]     ESOPHAGOSCOPY, GASTROSCOPY, DUODENOSCOPY (EGD), COMBINED  12/14/2012    Procedure: COMBINED ESOPHAGOSCOPY, GASTROSCOPY, DUODENOSCOPY (EGD), BIOPSY SINGLE OR MULTIPLE;  ESOPHAGOSCOPY, GASTROSCOPY, DUODENOSCOPY (EGD), DILATATION ;  Surgeon: Elizabeth Stevenson MD;  Location:  GI     ESOPHAGOSCOPY, GASTROSCOPY, DUODENOSCOPY (EGD), COMBINED  12/31/2013    Procedure: COMBINED ESOPHAGOSCOPY, GASTROSCOPY, DUODENOSCOPY (EGD), BIOPSY SINGLE  OR MULTIPLE;;  Surgeon: Clemente Lopez MD;  [facility]     ESOPHAGOSCOPY, GASTROSCOPY, DUODENOSCOPY (EGD), COMBINED  4/1/2014    Procedure: COMBINED ESOPHAGOSCOPY, GASTROSCOPY, DUODENOSCOPY (EGD);;  Surgeon: Clemente Lopez MD;  [facility]     ESOPHAGOSCOPY, GASTROSCOPY, DUODENOSCOPY (EGD), COMBINED  6/28/2014    Procedure: COMBINED ESOPHAGOSCOPY, GASTROSCOPY, DUODENOSCOPY (EGD);  Surgeon: Clemente Lopez MD;  [facility]     ESOPHAGOSCOPY, GASTROSCOPY, DUODENOSCOPY (EGD), COMBINED N/A 8/20/2014    Procedure: COMBINED ESOPHAGOSCOPY, GASTROSCOPY, DUODENOSCOPY (EGD), BIOPSY SINGLE OR MULTIPLE;  Surgeon: Duane, William Charles, MD;  Location:  OR     ESOPHAGOSCOPY, GASTROSCOPY, DUODENOSCOPY (EGD), COMBINED N/A 8/22/2014    Procedure: COMBINED ESOPHAGOSCOPY, GASTROSCOPY, DUODENOSCOPY (EGD), BIOPSY SINGLE OR MULTIPLE;  Surgeon: Mello Ferrer MD;  [facility]     ESOPHAGOSCOPY, GASTROSCOPY, DUODENOSCOPY (EGD), COMBINED N/A 10/2/2014    Procedure: COMBINED ESOPHAGOSCOPY, GASTROSCOPY, DUODENOSCOPY (EGD), BIOPSY SINGLE OR MULTIPLE;  Surgeon: Remy Haskins MD;  [facility]     ESOPHAGOSCOPY, GASTROSCOPY, DUODENOSCOPY (EGD), COMBINED Left 12/15/2014    Procedure: COMBINED ESOPHAGOSCOPY, GASTROSCOPY, DUODENOSCOPY (EGD), BIOPSY SINGLE OR MULTIPLE;  Surgeon: Remy Haskins MD;  [facility]     ESOPHAGOSCOPY, GASTROSCOPY, DUODENOSCOPY (EGD), COMBINED N/A 2/25/2015    Procedure: COMBINED ENDOSCOPIC ULTRASOUND, ESOPHAGOSCOPY, GASTROSCOPY, DUODENOSCOPY (EGD), FINE NEEDLE ASPIRATE/BIOPSY;  Surgeon: Clemente Lugo MD;  [facility]     ESOPHAGOSCOPY, GASTROSCOPY, DUODENOSCOPY (EGD), COMBINED Left 2/25/2015    Procedure: COMBINED ESOPHAGOSCOPY, GASTROSCOPY, DUODENOSCOPY (EGD), BIOPSY SINGLE OR MULTIPLE;  Surgeon: Clemente Lugo MD;  Location:  GI     ESOPHAGOSCOPY, GASTROSCOPY, DUODENOSCOPY (EGD), COMBINED N/A 9/25/2016    Procedure: COMBINED ESOPHAGOSCOPY, GASTROSCOPY,  DUODENOSCOPY (EGD);  Surgeon: Aziza Patiño MD;  [facility]     ESOPHAGOSCOPY, GASTROSCOPY, DUODENOSCOPY (EGD), COMBINED N/A 1/18/2017    Procedure: COMBINED ESOPHAGOSCOPY, GASTROSCOPY, DUODENOSCOPY (EGD), BIOPSY SINGLE OR MULTIPLE;  Surgeon: Clemente Lopez MD;  [facility]     ESOPHAGOSCOPY, GASTROSCOPY, DUODENOSCOPY (EGD), COMBINED N/A 11/26/2017    Procedure: COMBINED ESOPHAGOSCOPY, GASTROSCOPY, DUODENOSCOPY (EGD), REMOVE FOREIGN BODY;  Esophagogastroduodenoscopy with foreign body extraction  ;  Surgeon: Herberth Castrejon MD;  [facility]     ESOPHAGOSCOPY, GASTROSCOPY, DUODENOSCOPY (EGD), COMBINED N/A 11/26/2017    Procedure: COMBINED ESOPHAGOSCOPY, GASTROSCOPY, DUODENOSCOPY (EGD), REMOVE FOREIGN BODY;;  Surgeon: Herberth Castrejon MD;  [facility]     FASCIOTOMY LOWER EXTREMITY Left 6/10/2016    Procedure: FASCIOTOMY LOWER EXTREMITY;  Surgeon: Mello Rodriguez MD;  [facility]     HC CAPSULE ENDOSCOPY N/A 8/25/2014    Procedure: CAPSULE/PILL CAM ENDOSCOPY;  Surgeon: Remy Haskins MD;  [facility]     HC CAPSULE ENDOSCOPY N/A 10/2/2014    Procedure: CAPSULE/PILL CAM ENDOSCOPY;  Surgeon: Remy Haskins MD;  [facility]     ORTHOPEDIC SURGERY      broken wrist repair     SEX TRANSFORMATION SURGERY, MALE TO FEMALE      1974     SINUS SURGERY      cyst removed     TONSILLECTOMY       VASCULAR SURGERY      Left carotid stent       Family History   Problem Relation Age of Onset     Dementia Mother      Glaucoma Mother      DIABETES Mother      may have been type 1, childhood     Coronary Artery Disease Mother      MI     Glaucoma Father      DIABETES Father      may hev been type 1 - ??     Heart Failure Father      CANCER Maternal Aunt      leukemia     Schizophrenia Brother      Depression Brother      Suicide Sister      Depression Sister      DIABETES Sister      CANCER Maternal Aunt      ovarian     Glaucoma Maternal Grandmother      DIABETES Maternal  Grandmother      Glaucoma Maternal Grandfather      DIABETES Maternal Grandfather      Glaucoma Paternal Grandmother      DIABETES Paternal Grandmother      Glaucoma Paternal Grandfather      DIABETES Paternal Grandfather      Breast Cancer Sister      CEREBROVASCULAR DISEASE Brother      Colon Cancer No family hx of        Social History     Social History     Marital status:      Spouse name: Jayde     Number of children: 2     Years of education: N/A     Occupational History     mental health worker      retired     Social History Main Topics     Smoking status: Current Every Day Smoker     Packs/day: 2.50     Years: 30.00     Types: Cigarettes, Cigars     Last attempt to quit: 11/1/2000     Smokeless tobacco: Never Used      Comment: Smoking 3 cig per day with each meal.      Alcohol use No     Drug use: No     Sexual activity: Not Currently     Other Topics Concern     Caffeine Concern No     1 in the morning     Social History Narrative      Her father was in the  and she moved around a lot when she was a kid, and has lived abroad including in [place] and the [facility].  She was previously employed as a mental health worker. Moved from [place] to [place] in 2012. She is currently living in assisted living ([facility] in [facility]).  Wife passed away 6/2017.            She has 2 adopted children (Kam and Prashanth)         Current Facility-Administered Medications on File Prior to Encounter:  neostigmine (PROSTIGMINE) injection   glycopyrrolate (ROBINUL) injection     Current Outpatient Prescriptions on File Prior to Encounter:  pantoprazole (PROTONIX) 40 MG EC tablet Take 1 tablet (40 mg) by mouth 2 times daily   metFORMIN (GLUCOPHAGE-XR) 500 MG 24 hr tablet Take 1 tablet (500 mg) by mouth daily (with dinner)   brimonidine (ALPHAGAN) 0.2 % ophthalmic solution Place 1 drop into the right eye 3 times daily (Patient taking differently: Place 1 drop into the right eye 2 times daily )    dorzolamide-timolol (COSOPT) 2-0.5 % ophthalmic solution Place 1 drop into the right eye 2 times daily   sucralfate (CARAFATE) 1 GM tablet Take 1 tablet (1 g) by mouth 4 times daily May dissolve in 10 mL water is necessary. (Start upon completion of carafate suspension)   escitalopram (LEXAPRO) 5 MG tablet Take 2 tablets (10 mg) by mouth daily   albuterol (PROAIR HFA/PROVENTIL HFA/VENTOLIN HFA) 108 (90 BASE) MCG/ACT Inhaler Inhale 2 puffs into the lungs every 6 hours as needed for shortness of breath / dyspnea or wheezing   umeclidinium (INCRUSE ELLIPTA) 62.5 MCG/INH oral inhaler Inhale 1 puff into the lungs daily   ONDANSETRON PO Take 4 mg by mouth 3 times daily   cyclobenzaprine (FLEXERIL) 10 MG tablet Take 1 tablet (10 mg) by mouth 2 times daily as needed for muscle spasms   lidocaine (LIDODERM) 5 % Patch APPLY 1 TO 3 PATCHES TO PAINFUL AREA AT ONCE FOR UP TO 12 HOURS WITHIN A 24 HOUR PERIOD REMOVE AFTER 12 HOURS   blood glucose monitoring (ONE TOUCH ULTRASOFT) lancets Use to test blood sugar 3 times daily or as directed.   blood glucose monitoring (ONE TOUCH ULTRA) test strip Use to test blood sugars 3 times daily or as directed.   metoprolol (TOPROL-XL) 25 MG 24 hr tablet TAKE 1 TABLET (25 MG) BY MOUTH DAILY   traZODone (DESYREL) 100 MG tablet Take 1.5 tablets (150 mg) by mouth At Bedtime   order for [facility] Full electric hospital bed with half railsDx: [id number], [id number], [id number] of need: lifetime   order for [facility] Wheel Chair Cushion: 18 x 18 inch Roho cushion   order for [facility] Hospital bed with side rails   polyethylene glycol (MIRALAX/GLYCOLAX) Packet Take 17 g by mouth daily as needed Dissolved in water or juice    ACETAMINOPHEN PO Take 1,000 mg by mouth every 6 hours as needed for fever Taking q4-6 hours, per MAR   pregabalin (LYRICA) 75 MG capsule Take 2 capsules (150 mg) by mouth 2 times daily   cetirizine (ZYRTEC) 10 MG tablet Take 1 tablet (10 mg) by mouth daily as needed for allergies   diclofenac (VOLTAREN)  1 % GEL topical gel Apply 2 g topically 4 times daily to hands   EPINEPHrine (EPIPEN JR) 0.15 MG/0.3ML injection Inject 0.3 mLs (0.15 mg) into the muscle as needed for anaphylaxis   lisinopril (PRINIVIL/ZESTRIL) 10 MG tablet Take 1 tablet (10 mg) by mouth daily   risperiDONE (RISPERDAL) 0.5 MG tablet Take 0.5 mg by mouth At Bedtime   ferrous sulfate (IRON) 325 (65 FE) MG tablet Take 1 tablet (325 mg) by mouth 2 times daily With meals   order for DME Equipment being ordered: CPAP supplies mask, hose, filters, [facility]fax [phone]   order for DME Equipment being ordered: CPAP supplies mask, hose, filters, cushion fax to [facility] at [phone]: 10 Device Refills: prn Class: Local Print Start: 2/10/2017   order for DME Equipment being ordered: Nebulizer and tubing supplies   albuterol (2.5 MG/3ML) 0.083% neb solution INHALE 1 VIAL VIA NEBULIZER EVERY 6 HOURS AS NEEDED   CYANOCOBALAMIN PO Take 2,000 mcg by mouth daily   Nutritional Supplements (RENÉE) PACK Take 1 packet by mouth 2 times daily   fluticasone (FLONASE) 50 MCG/ACT nasal spray Spray 1 spray into both nostrils daily   ADVAIR DISKUS 250-50 MCG/DOSE diskus inhaler Inhale 1 puff into the lungs 2 times daily    calcium citrate-vitamin D (CITRACAL) 315-250 MG-UNIT TABS Take 2 tablets by mouth daily   hydrochlorothiazide (MICROZIDE) 12.5 MG capsule Take 1 capsule (12.5 mg) by mouth daily (Patient taking differently: Take 12.5 mg by mouth daily Hold for sbp<90)   estradiol (ESTRACE) 1 MG tablet Take 1 tablet (1 mg) by mouth daily   levETIRAcetam (KEPPRA) 500 MG tablet Take 1 tablet (500 mg) by mouth 2 times daily   aspirin EC 81 MG EC tablet Take 1 tablet (81 mg) by mouth daily (Patient taking differently: Take 81 mg by mouth every morning )   blood glucose monitoring (ONE TOUCH ULTRA 2) meter device kit Use to test blood sugars 3 times daily or as directed.   phenytoin 200 MG CAPS Take 200 mg by mouth 2 times daily (Patient taking  differently: Take 200 mg by mouth 2 times daily (takes at 8 AM and 8 PM))   dorzolamide (TRUSOPT) 2 % ophthalmic solution Place 1 drop into both eyes 2 times daily    zinc 50 MG TABS Take 1 tablet by mouth daily   nitroglycerin (NITROSTAT) 0.4 MG SL tablet Place 1 tablet (0.4 mg) under the tongue every 5 minutes as needed for chest pain if you are still having symptoms after 3 doses (15 minutes) call 911.   MEDICATION GIVEN BY INTRATHECAL PUMP - INSTRUCTION continuous May 19, 2017 - per [facility] in [place] [phone]:Conc: Bupivacaine 20 mg/mL and Fentanyl 2000 mcg/mL.Continuous: Bupivacaine 7.265 mg/day and Fentanyl 726.5 mcg/day.Boluses: Up to 7 boluses per 24-hr period of Bupivicaine 0.599 mg and Fentanyl 59.9 mcgPump Refill Date at [facility]: 7/2/17   latanoprost (XALATAN) 0.005 % ophthalmic solution Place 1 drop into both eyes At Bedtime   atorvastatin (LIPITOR) 40 MG tablet Take 1 tablet (40 mg) by mouth daily (Patient taking differently: Take 40 mg by mouth At Bedtime )   order for DME Equipment being ordered: Glucerna daily shakes with each meal   prochlorperazine (COMPAZINE) 10 MG tablet Take 1 tablet (10 mg) by mouth every 8 hours as needed   ORDER FOR DME Equipment being ordered: Power Wheelchair   ORDER FOR DME Equipment being ordered: Depends briefs   Cholecalciferol (VITAMIN D) 2000 UNITS tablet Take 2,000 Units by mouth daily.   Multiple Vitamin (MULTIVITAMIN OR) Take 1 tablet by mouth daily        Data:    Results for orders placed or performed during the hospital encounter of 12/05/17   CT Head w/o Contrast    Narrative    CT HEAD W/O CONTRAST 12/5/2017 10:57 PM    History: headache;     Comparison: CT 4/12/2017.    Technique: Using multidetector thin collimation helical acquisition  technique, axial, coronal and sagittal CT images from the skull base  to the vertex were obtained without intravenous contrast.     Findings:    There is no evidence of  intracranial hemorrhage, mass effect, midline  shift, or abnormal extraaxial fluid collection. Unchanged age-related  calcifications of basal ganglia. The ventricles and sulci are within  normal limits for age. Mild patchy periventricular white matter  hypodensities most consistent with chronic small vessel ischemic  disease. Gray-white differentiation is intact throughout both cerebral  hemispheres.  The bony calvaria and the bones of the skull base appear  normal.  The visualized mastoid air cells and paranasal sinuses are  clear.       Impression    Impression:   1. No acute intracranial pathology  2. Age-related bilateral basal ganglia calcifications are unchanged.    I have personally reviewed the examination and initial interpretation  and I agree with the findings.    TESSIE LOPEZ MD   CT Abdomen Pelvis w/o Contrast    Narrative    Exam: CT abdomen pelvis without contrast 12/5/2017 10:56 PM.    History: Abdominal pain, nausea vomiting.  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    Technique: CT images from the lung bases through the symphysis pubis  without contrast    Findings:   Cholecystectomy. Mild reservoir effect of the common bile duct. Trace  pneumobilia, also seen on prior exam. This is likely postprocedural.  Otherwise the liver, pancreas, spleen, kidneys, and adrenal glands are  unremarkable. No abnormally dilated loops of small bowel or colon. No  free air or free fluid. No abdominal or pelvic lymphadenopathy. Small  hiatal hernia. Right inguinal postsurgical changes. Moderate aorto  iliac calcification. Bilateral stents in the common iliac arteries  and  external iliac arteries. Patency of the vasculature cannot be  evaluated without contrast. Right buttocks neurostimulator device with  catheters in the spinal canal, tip not visualized.    The visualized lung bases are clear. No suspicious bony lesions.  Degenerative findings of the spine. Unchanged healing left-sided rib  fracture.      Impression    Impression:   1. New acute findings in the abdomen or pelvis.  2. Stable post procedural pneumobilia.    I have personally reviewed the examination and initial interpretation  and I agree with the findings.    JM DAMIAN MD   CBC with platelets differential   Result Value Ref Range    WBC 11.2 (H) 4.0 - 11.0 10e9/L    RBC Count 4.70 3.8 - 5.2 10e12/L    Hemoglobin 13.0 11.7 - 15.7 g/dL    Hematocrit 39.7 35.0 - 47.0 %    MCV 85 78 - 100 fl    MCH 27.7 26.5 - 33.0 pg    MCHC 32.7 31.5 - 36.5 g/dL    RDW 15.4 (H) 10.0 - 15.0 %    Platelet Count 319 150 - 450 10e9/L    Diff Method Automated Method     % Neutrophils 76.3 %    % Lymphocytes 12.1 %    % Monocytes 9.8 %    % Eosinophils 1.3 %    % Basophils 0.3 %    % Immature Granulocytes 0.2 %    Nucleated RBCs 0 0 /100    Absolute Neutrophil 8.5 (H) 1.6 - 8.3 10e9/L    Absolute Lymphocytes 1.4 0.8 - 5.3 10e9/L    Absolute Monocytes 1.1 0.0 - 1.3 10e9/L    Absolute Eosinophils 0.2 0.0 - 0.7 10e9/L    Absolute Basophils 0.0 0.0 - 0.2 10e9/L    Abs Immature Granulocytes 0.0 0 - 0.4 10e9/L    Absolute Nucleated RBC 0.0    INR   Result Value Ref Range    INR 1.07 0.86 - 1.14   Comprehensive metabolic panel   Result Value Ref Range    Sodium 140 133 - 144 mmol/L    Potassium 3.3 (L) 3.4 - 5.3 mmol/L    Chloride 104 94 - 109 mmol/L    Carbon Dioxide 31 20 - 32 mmol/L    Anion Gap 6 3 - 14 mmol/L    Glucose 92 70 - 99 mg/dL    Urea Nitrogen 8 7 - 30 mg/dL    Creatinine 0.46 (L) 0.52 - 1.04 mg/dL    GFR Estimate >90 >60 mL/min/1.7m2    GFR Estimate If Black >90 >60 mL/min/1.7m2    Calcium 9.6 8.5 - 10.1 mg/dL    Bilirubin  Total 0.1 (L) 0.2 - 1.3 mg/dL    Albumin 3.1 (L) 3.4 - 5.0 g/dL    Protein Total 8.8 6.8 - 8.8 g/dL    Alkaline Phosphatase 246 (H) 40 - 150 U/L    ALT 43 0 - 50 U/L    AST 29 0 - 45 U/L   Lipase   Result Value Ref Range    Lipase 130 73 - 393 U/L   Troponin I   Result Value Ref Range    Troponin I ES <0.015 0.000 - 0.045 ug/L   UA with Microscopic reflex to Culture   Result Value Ref Range    Color Urine Yellow     Appearance Urine Slightly Cloudy     Glucose Urine Negative NEG^Negative mg/dL    Bilirubin Urine Negative NEG^Negative    Ketones Urine Negative NEG^Negative mg/dL    Specific Gravity Urine 1.011 1.003 - 1.035    Blood Urine Trace (A) NEG^Negative    pH Urine 7.5 (H) 5.0 - 7.0 pH    Protein Albumin Urine 10 (A) NEG^Negative mg/dL    Urobilinogen mg/dL Normal 0.0 - 2.0 mg/dL    Nitrite Urine Positive (A) NEG^Negative    Leukocyte Esterase Urine Negative NEG^Negative    Source Unspecified Urine     WBC Urine 4 (H) 0 - 2 /HPF    RBC Urine 2 0 - 2 /HPF    Bacteria Urine Many (A) NEG^Negative /HPF    Squamous Epithelial /HPF Urine 3 (H) 0 - 1 /HPF    Mucous Urine Present (A) NEG^Negative /LPF    Hyaline Casts 1 0 - 2 /LPF   Magnesium   Result Value Ref Range    Magnesium 2.0 1.6 - 2.3 mg/dL   EKG 12-lead, tracing only   Result Value Ref Range    Interpretation ECG Click View Image link to view waveform and result    ISTAT gases lactate rubina POCT   Result Value Ref Range    Ph Venous 7.39 7.32 - 7.43 pH    PCO2 Venous 52 (H) 40 - 50 mm Hg    PO2 Venous 58 (H) 25 - 47 mm Hg    Bicarbonate Venous 31 (H) 21 - 28 mmol/L    O2 Sat Venous 89 %    Lactic Acid 0.5 (L) 0.7 - 2.1 mmol/L   Urine Culture Aerobic Bacterial   Result Value Ref Range    Specimen Description Midstream Urine     Special Requests Specimen received in preservative     Culture Micro PENDING      *Note: Due to a large number of results and/or encounters for the requested time period, some results have not been displayed. A complete set of results  can be found in Results Review.             EKG Interpretation:[AT1.1]    12/5/2017  19:34  Symptoms at time of EKG: nausea and vomiting   Rhythm: sinus bradycardia  Rate: 57 bpm  Axis: Normal  Ectopy: none  Conduction: normal  ST Segments/ T Waves: No ST-T wave changes  Comparison to prior on 10/08/17: HR now 57 from 95 bpm; otherwise no significant changes  Clinical Impression: sinus bradycardia, otherwise morphology unchanged from previous       [AT1.6]    ROS:    Review Of Systems[AT1.1]  General: positive for sweats and chills. Negative for measured fevers. Positive for fatigue.[AT1.7]  Skin:[AT1.1] No rashes.[AT1.7]  Eyes:[AT1.1] No acute vision changes.[AT1.7]  Ears/Nose/Throat:[AT1.1] Positive for rhinorrhea. No sore throat.[AT1.7]  Respiratory:[AT1.1] Positive for[AT1.7] shortness of breath, cough,[AT1.1] and wheezing. Negative for[AT1.7] hemoptysi[AT1.1]s.[AT1.7]  Cardiovascular:[AT1.1] Negative for chest pain, palpitations.[AT1.7]  Gastrointestinal:[AT1.1] Positive for abdominal pain, nausea, vomiting, constipation. Positive for hematemesis. Negative for black stools. Positive for hemorroids.[AT1.7]  Genitourinary:[AT1.1] Positive for dysuria, urinary frequency.   Musculoskeletal: Positive for chronic back pain.[AT1.7]  Neurologic:[AT1.1] Positive for headache. Positive for associated pre-syncope. Negative for head trauma recently.[AT1.7]    10 point ROS negative other than the symptoms noted above.      Exam:    Vitals:  B/P: 156/81, T: 97.6, P: Data Unavailable, R: 16    General: alert, appears[AT1.1] uncomfortable, sometimes moaning in bed, but in[AT1.4] NAD. Afebrile.  Skin: no suspicious lesions or rashes   Head: Normocephalic.  EENT: Sclera anicteric.[AT1.1] Cataracts bilaterally. Glacomatous changes bilaterally. Right pupil fixed constriction.[AT1.4] Oropharynx:[AT1.1] tacky MMs.[AT1.4]   Neck: Neck supple. No lymphadenopathy. Carotids without bruits.  Respiratory: No distress. Equal inspiration to  bilateral bases. Lungs CTAB- no crackles, wheezes, or rales.  Cardiovascular:[AT1.1] Mildly bradycardic, but normal S1/S2[AT1.4]. No murmurs, clicks gallops or rub.    Gastrointestinal: Abdomen[AT1.1] non-distended, diffusely tender.[AT1.4] BS normal.   Musculoskeletal:[AT1.1] bilateral BKA.[AT1.4]  Neurologic:[AT1.1] Alert.[AT1.4] Follows commands.[AT1.1] No dysarthria. EOMI. Facial movements symmetric. Sensation to light touch in CN V nerve root distributions.  strength 5/5 right, 4/5 left.[AT1.4]   Psychiatric: mentation appears normal[AT1.1].[AT1.4]              Signed:  Polly Thomas PA-C  December 6, 2017 at 8:04 AM[AT1.1]       Revision History        User Key Date/Time User Provider Type Action    > AT1.6 12/6/2017 11:51 AM Polly Thomas PA-C Physician Assistant - C Sign     AT1.4 12/6/2017 10:42 AM Polly Thomas PA-C Physician Assistant - C      AT1.3 12/6/2017 10:15 AM Polly Thomas PA-C Physician Assistant - C      AT1.5 12/6/2017 10:11 AM Polly Thomas PA-C Physician Assistant - C      AT1.7 12/6/2017  9:44 AM Polly Thomas PA-C Physician Assistant - C      AT1.2 12/6/2017  8:31 AM Polly Thomas PA-C Physician Assistant - C      AT1.1 12/6/2017  8:04 AM Polly Thomas PA-C Physician Assistant - C                      Discharge Summaries      Discharge Summaries by Polly Thomas PA-C at 12/6/2017  2:01 PM     Author:  Polly Thomas PA-C Service:  Emergency Medicine Author Type:  Physician Assistant - C    Filed:  12/6/2017  5:45 PM Date of Service:  12/6/2017  2:01 PM Creation Time:  12/6/2017  2:01 PM    Status:  Cosign Needed :  Polly Thomas PA-C (Physician Assistant - C)    Cosign Required:  Yes             Discharge Summary    Sonya Foote MRN# [id number]   YOB: 1950 Age: 67 year old     Date of Admission:  12/5/2017  Date of Discharge:[AT1.1]  12/6/2017[AT1.2]  Admitting  Physician:  Hannah Horan MD  Discharge Physician:  No att. providers found  Discharging Service:  ED observation     Primary Provider: Allan Casey          Discharge Diagnosis:[AT1.1]     Nausea & vomiting    * No resolved hospital problems. *[AT1.3]               Discharge Disposition:[AT1.1]   Discharged to assisted living[AT1.4]           Condition on Discharge:   Discharge condition:[AT1.1] Stable[AT1.4]   Code status on discharge:[AT1.1] Full Code[AT1.4]           Procedures:[AT1.1]   None[AT1.4]          Discharge Medications:[AT1.1]     Current Discharge Medication List      START taking these medications    Details   levofloxacin (LEVAQUIN) 500 MG tablet Take 1 tablet (500 mg) by mouth daily  Qty: 7 tablet, Refills: 0    Associated Diagnoses: Urinary tract infection without hematuria, site unspecified         CONTINUE these medications which have NOT CHANGED    Details   pantoprazole (PROTONIX) 40 MG EC tablet Take 1 tablet (40 mg) by mouth 2 times daily  Qty: 30 tablet, Refills: 1    Associated Diagnoses: Gastroesophageal reflux disease without esophagitis      metFORMIN (GLUCOPHAGE-XR) 500 MG 24 hr tablet Take 1 tablet (500 mg) by mouth daily (with dinner)  Qty: 90 tablet, Refills: 3    Associated Diagnoses: Type 2 diabetes mellitus with diabetic peripheral angiopathy and gangrene, without long-term current use of insulin (H)      brimonidine (ALPHAGAN) 0.2 % ophthalmic solution Place 1 drop into the right eye 3 times daily  Qty: 15 mL, Refills: 11    Associated Diagnoses: Primary open angle glaucoma of both eyes, severe stage      dorzolamide-timolol (COSOPT) 2-0.5 % ophthalmic solution Place 1 drop into the right eye 2 times daily  Qty: 1 Bottle, Refills: 11    Associated Diagnoses: Primary open angle glaucoma of both eyes, severe stage      sucralfate (CARAFATE) 1 GM tablet Take 1 tablet (1 g) by mouth 4 times daily May dissolve in 10 mL water is necessary. (Start upon completion of carafate  suspension)  Qty: 120 tablet, Refills: 11    Associated Diagnoses: Adjustment disorder with depressed mood      escitalopram (LEXAPRO) 5 MG tablet Take 2 tablets (10 mg) by mouth daily  Qty: 60 tablet, Refills: 5    Associated Diagnoses: Adjustment disorder with depressed mood      albuterol (PROAIR HFA/PROVENTIL HFA/VENTOLIN HFA) 108 (90 BASE) MCG/ACT Inhaler Inhale 2 puffs into the lungs every 6 hours as needed for shortness of breath / dyspnea or wheezing  Qty: 3 Inhaler, Refills: 1    Associated Diagnoses: JOSE (obstructive sleep apnea)      umeclidinium (INCRUSE ELLIPTA) 62.5 MCG/INH oral inhaler Inhale 1 puff into the lungs daily      ONDANSETRON PO Take 4 mg by mouth 3 times daily      cyclobenzaprine (FLEXERIL) 10 MG tablet Take 1 tablet (10 mg) by mouth 2 times daily as needed for muscle spasms  Qty: 30 tablet, Refills: 1    Associated Diagnoses: Cervicalgia      lidocaine (LIDODERM) 5 % Patch APPLY 1 TO 3 PATCHES TO PAINFUL AREA AT ONCE FOR UP TO 12 HOURS WITHIN A 24 HOUR PERIOD REMOVE AFTER 12 HOURS  Qty: 30 patch, Refills: 5    Associated Diagnoses: Chronic pain syndrome; 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 (H)      metoprolol (TOPROL-XL) 25 MG 24 hr tablet TAKE 1 TABLET (25 MG) BY MOUTH DAILY  Qty: 30 tablet, Refills: 10    Associated Diagnoses: Old myocardial infarction      traZODone (DESYREL) 100 MG tablet Take 1.5 tablets (150 mg) by mouth At Bedtime  Qty: 90 tablet, Refills: 3    Associated Diagnoses: Anxiety state      polyethylene glycol (MIRALAX/GLYCOLAX) Packet Take 17 g by mouth daily as needed Dissolved in water or juice       ACETAMINOPHEN PO Take 1,000 mg by mouth every 6 hours as needed for fever Taking q4-6 hours, per MAR      pregabalin (LYRICA) 75 MG capsule Take 2 capsules (150 mg) by mouth 2 times daily  Qty: 120 capsule, Refills: 5    Associated Diagnoses: Pain in both upper extremities      cetirizine (ZYRTEC) 10 MG tablet Take 1 tablet (10 mg) by mouth daily  as needed for allergies  Qty: 90 tablet, Refills: 3    Associated Diagnoses: Seasonal allergic rhinitis, unspecified allergic rhinitis trigger      diclofenac (VOLTAREN) 1 % GEL topical gel Apply 2 g topically 4 times daily to hands  Qty: 100 g, Refills: 11    Associated Diagnoses: Primary osteoarthritis of both hands      lisinopril (PRINIVIL/ZESTRIL) 10 MG tablet Take 1 tablet (10 mg) by mouth daily  Qty: 90 tablet, Refills: 3    Associated Diagnoses: Essential hypertension with goal blood pressure less than 130/80      risperiDONE (RISPERDAL) 0.5 MG tablet Take 0.5 mg by mouth At Bedtime      ferrous sulfate (IRON) 325 (65 FE) MG tablet Take 1 tablet (325 mg) by mouth 2 times daily With meals  Qty: 60 tablet, Refills: 2      albuterol (2.5 MG/3ML) 0.083% neb solution INHALE 1 VIAL VIA NEBULIZER EVERY 6 HOURS AS NEEDED  Qty: 360 mL, Refills: 11    Associated Diagnoses: Chronic obstructive pulmonary disease, unspecified COPD type (H)      CYANOCOBALAMIN PO Take 2,000 mcg by mouth daily      Nutritional Supplements (RENÉE) PACK Take 1 packet by mouth 2 times daily      fluticasone (FLONASE) 50 MCG/ACT nasal spray Spray 1 spray into both nostrils daily      ADVAIR DISKUS 250-50 MCG/DOSE diskus inhaler Inhale 1 puff into the lungs 2 times daily       calcium citrate-vitamin D (CITRACAL) 315-250 MG-UNIT TABS Take 2 tablets by mouth daily  Qty: 120 tablet, Refills: 5    Associated Diagnoses: Osteoporosis      hydrochlorothiazide (MICROZIDE) 12.5 MG capsule Take 1 capsule (12.5 mg) by mouth daily  Qty: 90 capsule, Refills: 3    Associated Diagnoses: Essential hypertension with goal blood pressure less than 130/80      estradiol (ESTRACE) 1 MG tablet Take 1 tablet (1 mg) by mouth daily  Qty: 90 tablet, Refills: 3    Associated Diagnoses: Person who has had sex change operation      levETIRAcetam (KEPPRA) 500 MG tablet Take 1 tablet (500 mg) by mouth 2 times daily  Qty: 180 tablet, Refills: 1    Associated Diagnoses:  Nausea      aspirin EC 81 MG EC tablet Take 1 tablet (81 mg) by mouth daily  Qty: 90 tablet, Refills: 3    Associated Diagnoses: Unstable angina (H)      phenytoin 200 MG CAPS Take 200 mg by mouth 2 times daily  Qty: 60 capsule, Refills: 0    Associated Diagnoses: Seizure disorder (H)      dorzolamide (TRUSOPT) 2 % ophthalmic solution Place 1 drop into both eyes 2 times daily       zinc 50 MG TABS Take 1 tablet by mouth daily      latanoprost (XALATAN) 0.005 % ophthalmic solution Place 1 drop into both eyes At Bedtime  Qty: 2.5 mL, Refills: 11    Comments: Plz remind pt to f/u as scheduled, 3/25/16. Thanks!  Associated Diagnoses: Primary open angle glaucoma, stage unspecified      atorvastatin (LIPITOR) 40 MG tablet Take 1 tablet (40 mg) by mouth daily  Qty: 90 tablet, Refills: 3    Associated Diagnoses: Hyperlipidemia LDL goal <100      prochlorperazine (COMPAZINE) 10 MG tablet Take 1 tablet (10 mg) by mouth every 8 hours as needed  Qty: 20 tablet, Refills: 0    Associated Diagnoses: Nausea with vomiting      Cholecalciferol (VITAMIN D) 2000 UNITS tablet Take 2,000 Units by mouth daily.  Qty: 100 tablet, Refills: 3      Multiple Vitamin (MULTIVITAMIN OR) Take 1 tablet by mouth daily       blood glucose monitoring (ONE TOUCH ULTRASOFT) lancets Use to test blood sugar 3 times daily or as directed.  Qty: 100 each, Refills: PRN    Associated Diagnoses: Type 2 diabetes mellitus with diabetic peripheral angiopathy without gangrene, without long-term current use of insulin (H)      blood glucose monitoring (ONE TOUCH ULTRA) test strip Use to test blood sugars 3 times daily or as directed.  Qty: 3 Box, Refills: 3    Associated Diagnoses: Type 2 diabetes mellitus with diabetic peripheral angiopathy without gangrene, without long-term current use of insulin (H)      !! order for DME Full electric hospital bed with half rails    Dx: I84997, I110, J449  Length of need: lifetime  Qty: 1 Device, Refills: 0    Associated  Diagnoses: Status post bilateral above knee amputation (H)      !! order for DME Wheel Chair Cushion: 18 x 18 inch Roho cushion  Qty: 1 Device, Refills: 0    Associated Diagnoses: Status post bilateral above knee amputation (H)      !! order for DME Hospital bed with side rails  Qty: 1 Device, Refills: 0    Associated Diagnoses: 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 (H)      EPINEPHrine (EPIPEN JR) 0.15 MG/0.3ML injection Inject 0.3 mLs (0.15 mg) into the muscle as needed for anaphylaxis  Qty: 0.6 mL, Refills: 1    Associated Diagnoses: Bee sting reaction, undetermined intent, subsequent encounter      !! order for DME Equipment being ordered: CPAP supplies mask, hose, filters, cushion    fax to [facility] at [phone]  Qty: 10 Device, Refills: prn    Associated Diagnoses: COPD (chronic obstructive pulmonary disease) (H)      !! order for DME Equipment being ordered: CPAP supplies mask, hose, filters, cushion fax to [facility] at [phone]  Disp: 10 Device Refills: prn   Class: Local Print Start: 2/10/2017  Qty: 1 Device, Refills: 0    Associated Diagnoses: Chronic obstructive pulmonary disease, unspecified COPD type (H)      !! order for DME Equipment being ordered: Nebulizer and tubing supplies  Qty: 1 Units, Refills: 0    Associated Diagnoses: Simple chronic bronchitis (H)      blood glucose monitoring (ONE TOUCH ULTRA 2) meter device kit Use to test blood sugars 3 times daily or as directed.  Qty: 1 kit, Refills: 0    Associated Diagnoses: Type 2 diabetes, HbA1C goal < 8% (H)      nitroglycerin (NITROSTAT) 0.4 MG SL tablet Place 1 tablet (0.4 mg) under the tongue every 5 minutes as needed for chest pain if you are still having symptoms after 3 doses (15 minutes) call 911.  Qty: 25 tablet, Refills: 1    Associated Diagnoses: Chronic systolic congestive heart failure (H); Old myocardial infarction      MEDICATION GIVEN BY INTRATHECAL PUMP - INSTRUCTION continuous May 19, 2017 -  per [facility] in [place] [phone]:  Conc: Bupivacaine 20 mg/mL and Fentanyl 2000 mcg/mL.  Continuous: Bupivacaine 7.265 mg/day and Fentanyl 726.5 mcg/day.  Boluses: Up to 7 boluses per 24-hr period of Bupivicaine 0.599 mg and Fentanyl 59.9 mcg    Pump Refill Date at [facility]: 7/2/17      !! order for DME Equipment being ordered: Glucerna daily shakes with each meal  Qty: 1 Box, Refills: 11    Associated Diagnoses: Type 2 diabetes mellitus with other diabetic neurological complication      !! ORDER FOR DME Equipment being ordered: Power Wheelchair  Qty: 1 Device, Refills: 0    Associated Diagnoses: CVA (cerebral infarction); HTN (hypertension)      !! ORDER FOR DME Equipment being ordered: Depends briefs  Qty: 1 Month, Refills: 11    Associated Diagnoses: Incontinence       !! - Potential duplicate medications found. Please discuss with provider.[AT1.3]                Consultations:[AT1.1]   None[AT1.4]             Brief History of Illness:[AT1.1]   Sonya Foote is a 67 year old transgender female with a history of schizoaffective disorder, gender change in 1974, DM2, diastolic CHF, asthma, COPD, seizure disorder, hypertension, MI, CVA (x3), bilateral BKA, dysphagia due to esophageal stricture as well as multiple other medical issues who presents via ambulance from her nursing facility for evaluation of nausea and vomitin[AT1.4]g in the setting of likely UTI. She was admitted to ED obs for IV rehydration and antiemetics.[AT1.3]   [AT1.4]          Hospital Course:[AT1.1]     1. UTI. UA in ED with + nitrite, 4 WBCs, many bacteria. She admits to dysuria and urinary frequency. Symptoms and lab findings consistent with UTI without complication at this time. No signs of sepsis as blood pressures normal, no tachycardia, afebrile, normal lactate.  She does have a history of recurrent UTIs- followed by urology as outpatient and long term plan is for suprapubic cather once able to come  off anticoagulation. CT negative for stones or hydronephrosis. She was last admitted 10/8/2017 for UTI secondary to Klebiella pneumoniae UTI resulting in sepsis. At that time, she was discharged on a 5-day course of Levaquin.  Today, patient received 1 dose of Levaquin IV in the ED. She started to feel much better later in the day. She received continued IV fluids and tolerated oral soft diet intake. Transition to PO Levaquin for discharge.   Urine culture pending- follow up on results with PCP.       2. Nausea, vomiting, abdominal pain. LLQ abdominal pain, with diffusely tender abdomen, no focal tenderness. CT in ED was unremarkable for acute pathology. CMP did show elevated Alk phos at 246, however this was consistent with prior values since 08/14/2017. No evidence for acute biliary obstruction as remainder of labs and imaging normal. No findings consistent with diverticulitis or colitis on CT. Troponin x 1 negative and EKG showed no ischemic changes- less likely cardiac etiology. May be due to constipation as she states she has not had a BM in 3-4 days. Also may be due to UTI as above.   Mildly dry mucous membranes but no signs of severe clinical dehydration as kidney function normal and stable, no tachycardia. One episode of emesis in AM after admission to obs unit.  She received sennako[AT1.4]t and did not have a BM. Recommend she take her Miralax daily at home until having soft BMs[AT1.3].   She received Zofran for antiemetic and started to feel much better later in the day- no further emesis while admitted. She reported an improvement in her abdominal pain and upon repeat examination, abdomen was no longer tender to palpation at all.  She received continued IV fluids and tolerated oral soft diet intake.   She was discharged in improving condition with plan for continued antiemetics PRN at home, for which she already has a prescription.     3. Hypokalemia. 3.3 in ED. Received IV replacement in ED. Magnesium  normal. Upon repeat CMP, K 3.6. No further emesis.      4. Sinus bradycardia. In ED, HR 53-68. EKG showed sinus bradycardia. Persistently bradycardic in low 50s on floor. Symptoms are not characteristic of symptomatic bradycardia.[AT1.4] Held[AT1.3] metoprolol[AT1.4] dose in AM. HR normalized to low 60s. She has been asymptomatic throughout. Return to home dose metoprolol upon discharge.[AT1.3]     Chronic Medical Issues:  #Dysphagia 2/2 esophageal strictures s/p previous dilatations. #Chronic erosive GERD  Admitted on 11/26/2017 following food bolus impaction which required upper GI removal. Was started on a PPI and discharged on mechanical soft diet.[AT1.4] On observation, diet was advanced as tolerated and she was able to tolerate PO intake of soft diet as prior to admission. Continue Protonix as PTA, F[AT1.3]ollow up with GI[AT1.4] as scheduled.[AT1.3]     #Allergic rhinitis  #COPD. Patient complains of chronic shortness of breath, but it is not worse than her baseline. Does admit to cough and yellow sputum production, rhinorrhea. However, lungs were clear, she was moving air well, O2 sats excellent at 96%. Do not think her presentation is consistent with COPD exacerbation, with chief complaint being nausea/vomiting.  Did not bring home inhalers, thus we gave Duonebs q4h scheduled and Pulmicort BID scheduled, with albuterol nebs q4h prn. Upon discharge, she[AT1.4] should[AT1.3] resume home inhalers.     #CAD with recent NSTEMI 9/2016 s/p stenting, completed 1 year of plavix. #HFpEF. #HTN  Stress test 10/2017 showing inferior wall MI without significant ischemia, Troponin x 1 negative in ED. EKG without ischemic changes. Does not complain of chest pain, shortness of breath worse than her baseline.  Continued PTA ASA, lisinopril, HCTZ. Held metoprolol in setting of bradycardia (see above). Bradycardia resolved. No ectopic events of continuous telemetry.      #PAD s/p bilateral lower extremity amputations. Multiple  previous surgeries. Bilateral femoral stenting in the past. Continued  ASA.        #DM2. Blood sugar 92 in ED, no signs of DKA. Last A1C was 5.1 on 11/20/2017. Euglycemic here and tolerating oral intake. Continued PTA metformin        #Transgender. Continued PTA estrogen HRT      #Chronic pain. Followed in pain clinic has pain pump which delivers continuous fentanyl as well as a max of 7 bumps daily.  Patient did bring her button to give herself bumps. Pain was well-controlled using this and lidocaine patches for back pain. We continued PTA Lyrica[AT1.4].[AT1.3]      #Restless leg syndrome. Continued PTA Lyrica      #Depression. #Anxiety. #Schizoaffective disorder. Continued PTA escitalopram.     #Insomnia. Continued PTA Trazodone.     #Seizure disorder. Continue[AT1.4]d[AT1.3] PTA Keppra and phenytoin. Seizure precautions while hospitalized.      [AT1.4]            Final Day of Progress before Discharge:       Physical Exam:[AT1.1]  Blood pressure 132/68, temperature 98.4  F (36.9  C), temperature source Oral, resp. rate 17, weight 62.1 kg (137 lb), SpO2 98 %, not currently breastfeeding.[AT1.3]       General: alert, appears comfortable and happy, but in NAD. Afebrile.  Skin: no suspicious lesions or rashes   Head: Normocephalic.  EENT: Sclera anicteric. Cataracts bilaterally. Glacomatous changes bilaterally. Right pupil fixed constriction. Oropharynx: tacky MMs.   Neck: Neck supple. No lymphadenopathy. Carotids without bruits.  Respiratory: No distress. Equal inspiration to bilateral bases. Lungs CTAB- no crackles, wheezes, or rales.  Cardiovascular: Mildly bradycardic, but normal S1/S2. No murmurs, clicks gallops or rub.    Gastrointestinal: Abdomen non-distended, non-tender. BS normal.   Musculoskeletal: bilateral BKA.  Neurologic: Alert. Follows commands. No dysarthria. EOMI. Facial movements symmetric. Sensation to light touch in CN V nerve root distributions.  strength 5/5 right, 4/5 left.   Psychiatric:  mentation appears normal.[AT1.4]         Data:  All laboratory data reviewed             Significant Results:[AT1.1]     Results for orders placed or performed during the hospital encounter of 12/05/17   CT Head w/o Contrast    Narrative    CT HEAD W/O CONTRAST 12/5/2017 10:57 PM    History: headache;     Comparison: CT 4/12/2017.    Technique: Using multidetector thin collimation helical acquisition  technique, axial, coronal and sagittal CT images from the skull base  to the vertex were obtained without intravenous contrast.     Findings:    There is no evidence of intracranial hemorrhage, mass effect, midline  shift, or abnormal extraaxial fluid collection. Unchanged age-related  calcifications of basal ganglia. The ventricles and sulci are within  normal limits for age. Mild patchy periventricular white matter  hypodensities most consistent with chronic small vessel ischemic  disease. Gray-white differentiation is intact throughout both cerebral  hemispheres.  The bony calvaria and the bones of the skull base appear  normal.  The visualized mastoid air cells and paranasal sinuses are  clear.       Impression    Impression:   1. No acute intracranial pathology  2. Age-related bilateral basal ganglia calcifications are unchanged.    I have personally reviewed the examination and initial interpretation  and I agree with the findings.    TESSIE LOPEZ MD   CT Abdomen Pelvis w/o Contrast    Narrative    Exam: CT abdomen pelvis without contrast 12/5/2017 10:56 PM.    History: Abdominal pain, nausea vomiting.  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    Technique: CT images  from the lung bases through the symphysis pubis  without contrast    Findings:   Cholecystectomy. Mild reservoir effect of the common bile duct. Trace  pneumobilia, also seen on prior exam. This is likely postprocedural.  Otherwise the liver, pancreas, spleen, kidneys, and adrenal glands are  unremarkable. No abnormally dilated loops of small bowel or colon. No  free air or free fluid. No abdominal or pelvic lymphadenopathy. Small  hiatal hernia. Right inguinal postsurgical changes. Moderate aorto  iliac calcification. Bilateral stents in the common iliac arteries and  external iliac arteries. Patency of the vasculature cannot be  evaluated without contrast. Right buttocks neurostimulator device with  catheters in the spinal canal, tip not visualized.    The visualized lung bases are clear. No suspicious bony lesions.  Degenerative findings of the spine. Unchanged healing left-sided rib  fracture.      Impression    Impression:   1. New acute findings in the abdomen or pelvis.  2. Stable post procedural pneumobilia.    I have personally reviewed the examination and initial interpretation  and I agree with the findings.    JM DAMIAN MD   CBC with platelets differential   Result Value Ref Range    WBC 11.2 (H) 4.0 - 11.0 10e9/L    RBC Count 4.70 3.8 - 5.2 10e12/L    Hemoglobin 13.0 11.7 - 15.7 g/dL    Hematocrit 39.7 35.0 - 47.0 %    MCV 85 78 - 100 fl    MCH 27.7 26.5 - 33.0 pg    MCHC 32.7 31.5 - 36.5 g/dL    RDW 15.4 (H) 10.0 - 15.0 %    Platelet Count 319 150 - 450 10e9/L    Diff Method Automated Method     % Neutrophils 76.3 %    % Lymphocytes 12.1 %    % Monocytes 9.8 %    % Eosinophils 1.3 %    % Basophils 0.3 %    % Immature Granulocytes 0.2 %    Nucleated RBCs 0 0 /100    Absolute Neutrophil 8.5 (H) 1.6 - 8.3 10e9/L    Absolute Lymphocytes 1.4 0.8 - 5.3 10e9/L    Absolute Monocytes 1.1 0.0 - 1.3 10e9/L    Absolute Eosinophils 0.2 0.0 - 0.7 10e9/L    Absolute Basophils 0.0 0.0 - 0.2 10e9/L    Abs  Immature Granulocytes 0.0 0 - 0.4 10e9/L    Absolute Nucleated RBC 0.0    INR   Result Value Ref Range    INR 1.07 0.86 - 1.14   Comprehensive metabolic panel   Result Value Ref Range    Sodium 140 133 - 144 mmol/L    Potassium 3.3 (L) 3.4 - 5.3 mmol/L    Chloride 104 94 - 109 mmol/L    Carbon Dioxide 31 20 - 32 mmol/L    Anion Gap 6 3 - 14 mmol/L    Glucose 92 70 - 99 mg/dL    Urea Nitrogen 8 7 - 30 mg/dL    Creatinine 0.46 (L) 0.52 - 1.04 mg/dL    GFR Estimate >90 >60 mL/min/1.7m2    GFR Estimate If Black >90 >60 mL/min/1.7m2    Calcium 9.6 8.5 - 10.1 mg/dL    Bilirubin Total 0.1 (L) 0.2 - 1.3 mg/dL    Albumin 3.1 (L) 3.4 - 5.0 g/dL    Protein Total 8.8 6.8 - 8.8 g/dL    Alkaline Phosphatase 246 (H) 40 - 150 U/L    ALT 43 0 - 50 U/L    AST 29 0 - 45 U/L   Lipase   Result Value Ref Range    Lipase 130 73 - 393 U/L   Troponin I   Result Value Ref Range    Troponin I ES <0.015 0.000 - 0.045 ug/L   UA with Microscopic reflex to Culture   Result Value Ref Range    Color Urine Yellow     Appearance Urine Slightly Cloudy     Glucose Urine Negative NEG^Negative mg/dL    Bilirubin Urine Negative NEG^Negative    Ketones Urine Negative NEG^Negative mg/dL    Specific Gravity Urine 1.011 1.003 - 1.035    Blood Urine Trace (A) NEG^Negative    pH Urine 7.5 (H) 5.0 - 7.0 pH    Protein Albumin Urine 10 (A) NEG^Negative mg/dL    Urobilinogen mg/dL Normal 0.0 - 2.0 mg/dL    Nitrite Urine Positive (A) NEG^Negative    Leukocyte Esterase Urine Negative NEG^Negative    Source Unspecified Urine     WBC Urine 4 (H) 0 - 2 /HPF    RBC Urine 2 0 - 2 /HPF    Bacteria Urine Many (A) NEG^Negative /HPF    Squamous Epithelial /HPF Urine 3 (H) 0 - 1 /HPF    Mucous Urine Present (A) NEG^Negative /LPF    Hyaline Casts 1 0 - 2 /LPF   Magnesium   Result Value Ref Range    Magnesium 2.0 1.6 - 2.3 mg/dL   Glucose by meter   Result Value Ref Range    Glucose 117 (H) 70 - 99 mg/dL   Comprehensive metabolic panel   Result Value Ref Range    Sodium 139 133  - 144 mmol/L    Potassium 3.6 3.4 - 5.3 mmol/L    Chloride 106 94 - 109 mmol/L    Carbon Dioxide 26 20 - 32 mmol/L    Anion Gap 8 3 - 14 mmol/L    Glucose 85 70 - 99 mg/dL    Urea Nitrogen 8 7 - 30 mg/dL    Creatinine 0.43 (L) 0.52 - 1.04 mg/dL    GFR Estimate >90 >60 mL/min/1.7m2    GFR Estimate If Black >90 >60 mL/min/1.7m2    Calcium 9.2 8.5 - 10.1 mg/dL    Bilirubin Total 0.2 0.2 - 1.3 mg/dL    Albumin 2.9 (L) 3.4 - 5.0 g/dL    Protein Total 7.8 6.8 - 8.8 g/dL    Alkaline Phosphatase 235 (H) 40 - 150 U/L    ALT 45 0 - 50 U/L    AST 34 0 - 45 U/L   CBC with platelets differential   Result Value Ref Range    WBC 8.6 4.0 - 11.0 10e9/L    RBC Count 4.58 3.8 - 5.2 10e12/L    Hemoglobin 12.6 11.7 - 15.7 g/dL    Hematocrit 38.5 35.0 - 47.0 %    MCV 84 78 - 100 fl    MCH 27.5 26.5 - 33.0 pg    MCHC 32.7 31.5 - 36.5 g/dL    RDW 15.7 (H) 10.0 - 15.0 %    Platelet Count 280 150 - 450 10e9/L    Diff Method Automated Method     % Neutrophils 74.7 %    % Lymphocytes 14.9 %    % Monocytes 9.3 %    % Eosinophils 0.8 %    % Basophils 0.1 %    % Immature Granulocytes 0.2 %    Nucleated RBCs 0 0 /100    Absolute Neutrophil 6.4 1.6 - 8.3 10e9/L    Absolute Lymphocytes 1.3 0.8 - 5.3 10e9/L    Absolute Monocytes 0.8 0.0 - 1.3 10e9/L    Absolute Eosinophils 0.1 0.0 - 0.7 10e9/L    Absolute Basophils 0.0 0.0 - 0.2 10e9/L    Abs Immature Granulocytes 0.0 0 - 0.4 10e9/L    Absolute Nucleated RBC 0.0    Glucose by meter   Result Value Ref Range    Glucose 89 70 - 99 mg/dL   EKG 12-lead, tracing only   Result Value Ref Range    Interpretation ECG Click View Image link to view waveform and result    ISTAT gases lactate rubina POCT   Result Value Ref Range    Ph Venous 7.39 7.32 - 7.43 pH    PCO2 Venous 52 (H) 40 - 50 mm Hg    PO2 Venous 58 (H) 25 - 47 mm Hg    Bicarbonate Venous 31 (H) 21 - 28 mmol/L    O2 Sat Venous 89 %    Lactic Acid 0.5 (L) 0.7 - 2.1 mmol/L   Urine Culture Aerobic Bacterial   Result Value Ref Range    Specimen Description  Midstream Urine     Special Requests Specimen received in preservative     Culture Micro Culture in progress      *Note: Due to a large number of results and/or encounters for the requested time period, some results have not been displayed. A complete set of results can be found in Results Review.[AT1.3]      Recent Results (from the past 48 hour(s))   CT Abdomen Pelvis w/o Contrast    Narrative    Exam: CT abdomen pelvis without contrast 12/5/2017 10:56 PM.    History: Abdominal pain, nausea vomiting.  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    Technique: CT images from the lung bases through the symphysis pubis  without contrast    Findings:   Cholecystectomy. Mild reservoir effect of the common bile duct. Trace  pneumobilia, also seen on prior exam. This is likely postprocedural.  Otherwise the liver, pancreas, spleen, kidneys, and adrenal glands are  unremarkable. No abnormally dilated loops of small bowel or colon. No  free air or free fluid. No abdominal or pelvic lymphadenopathy. Small  hiatal hernia. Right inguinal postsurgical changes. Moderate aorto  iliac calcification. Bilateral stents in the common iliac arteries and  external iliac arteries. Patency of the vasculature cannot be  evaluated without contrast. Right buttocks neurostimulator device with  catheters in the spinal canal, tip not visualized.    The visualized lung bases are clear. No suspicious bony lesions.  Degenerative findings of the spine. Unchanged healing left-sided rib  fracture.      Impression    Impression:   1. New acute findings in the abdomen or pelvis.  2. Stable post procedural  pneumobilia.    I have personally reviewed the examination and initial interpretation  and I agree with the findings.    JM DAMIAN MD   CT Head w/o Contrast    Narrative    CT HEAD W/O CONTRAST 12/5/2017 10:57 PM    History: headache;     Comparison: CT 4/12/2017.    Technique: Using multidetector thin collimation helical acquisition  technique, axial, coronal and sagittal CT images from the skull base  to the vertex were obtained without intravenous contrast.     Findings:    There is no evidence of intracranial hemorrhage, mass effect, midline  shift, or abnormal extraaxial fluid collection. Unchanged age-related  calcifications of basal ganglia. The ventricles and sulci are within  normal limits for age. Mild patchy periventricular white matter  hypodensities most consistent with chronic small vessel ischemic  disease. Gray-white differentiation is intact throughout both cerebral  hemispheres.  The bony calvaria and the bones of the skull base appear  normal.  The visualized mastoid air cells and paranasal sinuses are  clear.       Impression    Impression:   1. No acute intracranial pathology  2. Age-related bilateral basal ganglia calcifications are unchanged.    I have personally reviewed the examination and initial interpretation  and I agree with the findings.    TESSIE LOPEZ MD                Pending Results:[AT1.1]   Unresulted Labs Ordered in the Past 30 Days of this Admission     Date and Time Order Name Status Description    12/5/2017 2302 Urine Culture Aerobic Bacterial Preliminary[AT1.3]                   Discharge Instructions and Follow-Up:[AT1.1]     Discharge Procedure Orders  Reason for your hospital stay   Order Comments: Nausea, vomiting, and abdominal pain     Follow Up and recommended labs and tests   Order Comments: Follow up with primary care provider, Allan Casey, within 7 days for hospital follow- up.  Labs and testing at discretion of your PCP.     When to contact your care  team   Order Comments: See your PCP if again developing nausea, vomiting, or abdominal pain, or if other new symptoms.  Return to ED immediately if bloody vomit, bloody or black stools, severe abdominal pain, chest pain, shortness of breath, inability to tolerate oral intake, or any other new concerning symptoms.     Discharge Instructions   Order Comments: You were admitted to ED observation for nausea, vomiting, and abdominal pain.   Your urine showed signs of a UTI, which may be the cause for your symptoms.  Your other labs and tests were all reassuring and within normal limits.   You received 1 dose of IV antibiotics for treatment of your UTI. You received IV rehydration and anti-nausea medication, which helped to clear up your symptoms.   You received 1 dose of senna to help promote a bowel movement. Please take Miralax daily at home until having soft stools.  Take Levaquin once daily with food x 7 days at home for ongoing treatment of UTI.  Push clear liquid intake at home.     See your PCP if again developing nausea, vomiting, or abdominal pain, or if other new symptoms.  Return to ED immediately if bloody vomit, bloody or black stools, severe abdominal pain, chest pain, shortness of breath, inability to tolerate oral intake, or any other new concerning symptoms.     Full Code     Diet   Order Comments: Follow this diet upon discharge: soft diet as instructed by GI, same as prior to admission   Order Specific Question Answer Comments   Is discharge order? Yes[AT1.3]             Attestation:  Polly Thomas PA-C  12/06/17[AT1.1]                     Revision History        User Key Date/Time User Provider Type Action    > AT1.2 12/6/2017  5:45 PM Polly Thomas PA-C Physician Assistant - C Sign     AT1.3 12/6/2017  5:38 PM Polly Thomas PA-C Physician Assistant - C      AT1.4 12/6/2017  2:03 PM Polly Thomas PA-C Physician Assistant - C      AT1.1 12/6/2017  2:01 PM Martha  Polly Dickerson PA-C Physician Assistant - C                   Consult Notes     No notes of this type exist for this encounter.         Progress Notes - Physician (Notes from 12/03/17 through 12/06/17)      Progress Notes by Joceline Dunbar MSW at 12/6/2017  6:45 PM     Author:  Joceline Dunbar MSW Service:  Social Work Author Type:      Filed:  12/6/2017  6:52 PM Date of Service:  12/6/2017  6:45 PM Creation Time:  12/6/2017  6:45 PM    Status:  Signed :  Joceline Dunbar MSW ()         Social Work Services Progress Note[KB1.1]    Hospital Day: 2[KB1.2]  Date of Initial Social Work Evaluation:  N/A  Collaborated with:  RN    Data:  On-call SW was paged for assistance with transportation for Pt who is discharged. Pt's normal transportation service through [employer] is not available at this hour. Pt requires a w/c transport due to bilateral AKA.     Intervention:  Writer spoke with RN about the above situation and reviewed Pt's insurance. Pt is returning to her assisted living tonight. Writer advised using [facility] ([phone]) as Pt has [employer] and typically has transportation coverage. [facility] should bill Pt's insurance, though they will not guarantee coverage at this hour. RN will call to arrange the ride.    Assessment:  SW consulted to assist with concerns about Pt's discharge transportation.    Plan:    Anticipated Disposition:  Home, no needs identified    Barriers to d/c plan:  Transportation arrangement.    Follow Up:  RN to call [employer].      Joceline Dunbar [facility]  Emergency Department   Pager: [phone][KB1.1]       Revision History        User Key Date/Time User Provider Type Action    > KB1.2 12/6/2017  6:52 PM Joceline Dunbar MSW  Sign     KB1.1 12/6/2017  6:45 PM Joceline Dunbar MSW              Progress Notes by Kiley Alarcon, RN at 12/6/2017 10:15 AM     Author:   Kiley Alarcon, RN Service:  (none) Author Type:  Care Coordinator    Filed:  12/6/2017  4:09 PM Date of Service:  12/6/2017 10:15 AM Creation Time:  12/6/2017 10:19 AM    Status:  Addendum :  Kiley Alarcon RN (Care Coordinator)           Care Coordinator Progress Note     Admission Date/Time:  12/5/2017  Attending MD:  No att. providers found     Data  Chart reviewed, discussed with interdisciplinary team.   Patient was admitted for: Nausea and vomiting, intractability of vomiting not specified, unspecified vomiting type.    Concerns with insurance coverage for discharge needs: None.  Current Living Situation: Patient lives in an assisted living facility.  Support System: Supportive and Involved  Services Involved: Assisted Living  Transportation: [place] transportation,  [facility] a Ride [phone]  Barriers to Discharge: chronically ill, dependent with mobility/activities of daily living and physical impairment    Coordination of Care and Referrals: Provided patient/family with options for discussion of current living situation.       Assessment   At pt bedside to discuss RNCC role and pt current living situation. [facility] [place] [facility] and pt states she is satisfied with her living arrangements.[KK1.1] Pt also states she has RN home care services once per week through .[KK1.2] Snoya states she is mobile via an electric wheelchair, which is at her assisted living facility. She states she will be able to use a standard wheelchair for transport back home via medical transport van. Pt states she calls [facility] transportation to get to and from medical appointments. Will need transportation once DC'ed.[KK1.1]     [facility]  (p) [phone]  (f)  [phone][KK1.2]    Plan  Anticipated Discharge Date:  Pending  Anticipated Discharge Plan:  DC with provider recommendations.     Kiley Alarcon RN CC  [place] [facility]/6D  Obs  [phone][KK1.1]             Revision History        User Key Date/Time User Provider Type Action    > KK1.2 12/6/2017  4:09 PM Kiley Alarcon RN Care Coordinator Addend     KK1.1 12/6/2017 10:39 AM Kiley Alarcon RN Care Coordinator Sign            Progress Notes by Keshia Schwab RN at 12/6/2017 11:11 AM     Author:  Keshia Schwab RN Service:  (none) Author Type:  Registered Nurse    Filed:  12/6/2017 11:15 AM Date of Service:  12/6/2017 11:11 AM Creation Time:  12/6/2017 11:11 AM    Status:  Signed :  Keshia Schwab RN (Registered Nurse)         [facility]  Patient is currently open to home care services with [facility].  The patient is currently receiving RN/OT services.  [facility]  and team have been notified of patient admission.  [facility] liaison will continue to follow patient during stay.  If appropriate provide orders to resume home care at time of discharge.    Thank you  Keshia Schwab RN, BSN  [facility] Liaison  [phone][SH1.1]         Revision History        User Key Date/Time User Provider Type Action    > SH1.1 12/6/2017 11:15 AM Keshia Schwab RN Registered Nurse Sign            Progress Notes by Stefani Cerna RN at 12/6/2017 10:23 AM     Author:  Stefani Cerna RN Service:  (none) Author Type:  Registered Nurse    Filed:  12/6/2017 10:24 AM Date of Service:  12/6/2017 10:23 AM Creation Time:  12/6/2017 10:23 AM    Status:  Signed :  Stefani Cerna RN (Registered Nurse)         Pt noted to be bradycardic, put on telemetry - SB rhythm.[MI1.1]     Revision History        User Key Date/Time User Provider Type Action    > MI1.1 12/6/2017 10:24 AM Stefani Cerna RN Registered Nurse Sign            Progress Notes by Jhoana Pro RN at 12/6/2017  6:01 AM     Author:  Jhoana Pro RN Service:  (none) Author Type:  Registered Nurse    Filed:  12/6/2017  6:01 AM Date of Service:  12/6/2017  6:01 AM Creation Time:  12/6/2017   6:01 AM    Status:  Signed :  Jhoana Pro RN (Registered Nurse)         Patient arrived to unit 6D[KE1.1]     Revision History        User Key Date/Time User Provider Type Action    > KE1.1 12/6/2017  6:01 AM Jhoana Pro RN Registered Nurse Sign            Progress Notes by Hannah Horan MD at 12/6/2017  5:27 AM     Author:  Hannah Horan MD Service:  Emergency Medicine Author Type:  Physician    Filed:  12/6/2017  5:34 AM Date of Service:  12/6/2017  5:27 AM Creation Time:  12/6/2017  5:27 AM    Status:  Signed :  Hannah Horan MD (Physician)         Emergency Medicine Observation Attending note    The patient was independently seen and examined by me. The chart, vital signs, and labs were reviewed. The patient's findings were discussed with the KENNY on the observation unit, and I agree with the plan, as noted below.    68 yo female with multiple comorbidities, was admitted to the observation unit after presenting to the ER with c/o N/V. She reports nausea for one week, though the vomiting just started yesterday. Mulitiple episodes of non-bloody emesis. No diarrhea. She additionally c/o intermittent HA, as well as dysuria for a couple of weeks. She reports h/o UTIs in the past. W/u in the ED, including basic labs, head CT, and abd CT neg. UA only showed 4 WBC, though was positive for nitrites. She had ongoing n/v despite antiemetics in the ED, so was admitted to the obs unit for sx control. This am she reports ongoing nausea and HA, though has not had any recent vomiting.[MT1.1]    /77  Temp 97.8  F (36.6  C) (Oral)  Resp 12  SpO2 100%[MT1.2]    Exam:  General: awake, alert, NAD  HEENT: NC/AT, oropharynx moist and clear  Neck: supple  Lungs: CTA-B  Heart: RRR, no M/R/G  Abd: soft, ND, mild suprapubic tenderness as well as epigastric tenderness without guarding  Ext: bilateral AKA    Assessment/plan:  1. N/V - cause unclear. No worrisome findings on labs or abd CT.  Will continue to treat symptomatically and replacing fluids.  2. Dysuria - only 4 WBCs in the urine, though she does have positive nitrite - which makes me inclined to believe that this is a real infection. She states that sx are similar to those she's had in the past. She's PCN allergic. Will give dose of Levaquin and cx.[MT1.1]      Revision History        User Key Date/Time User Provider Type Action    > MT1.2 12/6/2017  5:34 AM Hannah Horan MD Physician Sign     MT1.1 12/6/2017  5:27 AM Hannah Horan MD Physician             ED Notes by Silvina Raymond RN at 12/5/2017 11:12 PM     Author:  Silvina Raymond RN Service:  (none) Author Type:  Registered Nurse    Filed:  12/5/2017 11:12 PM Date of Service:  12/5/2017 11:12 PM Creation Time:  12/5/2017 11:12 PM    Status:  Signed :  Silvina Raymond RN (Registered Nurse)         Report given to EDWARDO White and care of patient transferred.[BP1.1]     Revision History        User Key Date/Time User Provider Type Action    > BP1.1 12/5/2017 11:12 PM Silvina Raymond RN Registered Nurse Sign            ED Notes by Cindy Summers RN at 12/5/2017  7:17 PM     Author:  Cindy Summers RN Service:  (none) Author Type:  Registered Nurse    Filed:  12/5/2017  7:18 PM Date of Service:  12/5/2017  7:17 PM Creation Time:  12/5/2017  7:17 PM    Status:  Signed :  Cindy Summers RN (Registered Nurse)         BIBA from [facility] for nausea and vomiting that started this morning. Pt developed headache this evening. Hx DM, bilateral AKA, chronic pain.[RW1.1]      Revision History        User Key Date/Time User Provider Type Action    > RW1.1 12/5/2017  7:18 PM Cindy Summers RN Registered Nurse Sign                  Procedure Notes     No notes of this type exist for this encounter.      Progress Notes - Therapies (Notes from 12/03/17 through 12/06/17)     No notes of this type exist for this encounter.

## 2017-12-05 NOTE — IP AVS SNAPSHOT
[facility] [address] [address]    Phone:  [phone]    Fax:  [phone]                                       After Visit Summary   12/5/2017    Sonya Foote    MRN: [id number]           After Visit Summary Signature Page     I have received my discharge instructions, and my questions have been answered. I have discussed any challenges I see with this plan with the nurse or doctor.    ..........................................................................................................................................  Patient/Patient Representative Signature      ..........................................................................................................................................  Patient Representative Print Name and Relationship to Patient    ..................................................               ................................................  Date                                            Time    ..........................................................................................................................................  Reviewed by Signature/Title    ...................................................              ..............................................  Date                                                            Time

## 2017-12-05 NOTE — IP AVS SNAPSHOT
"    UNIT 6D OBSERVATION [facility]: [phone]                                              INTERAGENCY TRANSFER FORM - PHYSICIAN ORDERS   2017                    Hospital Admission Date: 2017  SHARATH MERCEDES   : 1950  Sex: Female        Attending Provider: (none)    Allergies:  Bee Venom, Penicillins, Dilantin [Phenytoin], Iodide, Iodine-131, Novocaine [Procaine], Tositumomab    Infection:  None   Service:  EMERGENCY ME    Ht:  1.702 m (5' 7\")   Wt:  62.1 kg (137 lb)   Admission Wt:  62.1 kg (137 lb)    BMI:  21.46 kg/m 2   BSA:  1.71 m 2            Patient PCP Information     Provider PCP Type    Allan Casey MD General      ED Clinical Impression     Diagnosis Description Comment Added By Time Added    Nausea and vomiting, intractability of vomiting not specified, unspecified vomiting type [R11.2] Nausea and vomiting, intractability of vomiting not specified, unspecified vomiting type [R11.2]  Cary Dick MD 2017  2:02 AM    Urinary tract infection without hematuria, site unspecified [N39.0] Urinary tract infection without hematuria, site unspecified [N39.0]  Polly Thomas PA-C 2017  5:35 PM      Hospital Problems as of 2017              Priority Class Noted POA    Nausea & vomiting Medium  2017 Yes      Non-Hospital Problems as of 2017              Priority Class Noted    Asthma Medium  2003    Open-angle glaucoma Medium  2003    Old myocardial infarction Medium  2005    Iron deficiency anemia Medium  2005    Degeneration of intervertebral disc of lumbosacral region Medium  2005    Cerebral infarction due to occlusion or stenosis of carotid artery Medium  2006    Late effect of stroke Medium  2006    Disorder of bone and cartilage Medium  2007    Osteopenia Medium  2007    Single seizure (H) Medium  2007    Prediabetes Medium  2008    Chronic pain syndrome Medium  3/20/2009    Thoracic or " lumbosacral neuritis or radiculitis Medium  3/20/2009    Lumbosacral radiculitis Medium  3/20/2009    Androgen insensitivity syndrome Medium  3/23/2009    Testicular feminization Medium  3/23/2009    Hereditary and idiopathic peripheral neuropathy Medium  7/10/2009    Peripheral neuropathy Medium  7/10/2009    Hx of colonic polyp Medium  7/13/2009    Anxiety disorder due to general medical condition Medium  7/29/2009    Central retinal artery occlusion Medium  8/19/2009    Adjustment disorder with depressed mood Medium  9/16/2009    Hyperlipidemia LDL goal <100 Medium  9/4/2012    Seizure disorder (H) Medium  9/4/2012    ACP (advance care planning) Medium  9/13/2012    Chronic obstructive pulmonary disease, unspecified COPD type (H) Medium  1/1/2013    Osteoporosis Medium  1/21/2013    Schizoaffective disorder (H) Medium  6/7/2013    AS (sickle cell trait) (H) Medium  10/8/2013    Vertigo Medium  11/8/2013    Person who has had sex change operation Medium  1/20/2014    Claudication in peripheral vascular disease (H) Medium  4/22/2014    Intestinal malabsorption Medium  8/6/2014    GIB (gastrointestinal bleeding) Medium  8/21/2014    Cervicalgia Medium  1/15/2015    Health Care Home Medium  5/4/2015    PAD (peripheral artery disease) (H) Medium  9/14/2015    Chronic systolic congestive heart failure (H) Medium  3/8/2016    Stenosis of carotid artery Medium  4/1/2016    Osteoarthritis Medium  5/19/2016    Pain in both upper extremities Medium  5/19/2016    Atherosclerotic peripheral vascular disease with rest pain (H) Medium  6/6/2016    Dysphagia Medium  8/9/2016    Essential hypertension Medium  9/2/2016    Cellulitis of right ankle Medium  9/7/2016    Angina pectoris, crescendo (H) Medium  9/17/2016    Type 2 diabetes mellitus with diabetic peripheral angiopathy without gangrene, without long-term current use of insulin (H) Medium  10/12/2016    Anemia, unspecified type Medium  10/22/2016    Critical lower limb  ischemia Medium  11/7/2016    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 (H) Medium  11/23/2016    Primary open angle glaucoma of both eyes, severe stage Medium  12/8/2016    Pseudophakia of right eye Medium  12/8/2016    Cataract, left eye Medium  12/8/2016    Diabetes mellitus type 2 without retinopathy (H) Medium  12/8/2016    Pyelonephritis Medium  12/22/2016    JOSE (obstructive sleep apnea) Medium  2/22/2017    Complex sleep apnea syndrome Medium  3/15/2017    Coronary artery disease of native artery of native heart with stable angina pectoris (H) Medium  4/4/2017    Hyperlipidemia Medium  4/4/2017    Ischemic cardiomyopathy Medium  4/4/2017    Bee sting reaction, undetermined intent, subsequent encounter Medium  4/6/2017    Functional incontinence Medium  7/19/2017    Personal history of urinary tract infection Medium  7/19/2017    Essential hypertension with goal blood pressure less than 130/80 Medium  9/27/2017    Hyperlipidemia LDL goal <70 Medium  9/27/2017    UTI (urinary tract infection) Medium  10/8/2017    Food impaction of esophagus Medium  11/26/2017    Esophageal foreign body Medium  11/27/2017      Code Status History     Date Active Date Inactive Code Status Order ID Comments User Context    12/6/2017  5:33 PM  Full Code [id number]  Polly Thomas PA-C Outpatient    12/6/2017  2:07 AM 12/6/2017  5:33 PM Full Code [id number]  Cary Dick MD ED    11/27/2017 10:05 AM 12/6/2017  2:07 AM Full Code [id number]  Leo Meyer APRN CNP Outpatient    11/27/2017 12:15 AM 11/27/2017 10:05 AM DNR [id number]  Syed Kruse APRN CNP Inpatient    10/8/2017  9:55 PM 10/11/2017  6:56 PM DNR/DNI [id number]  Sissy Miles MD Inpatient    6/2/2017 11:59 AM 10/8/2017  9:55 PM Full Code [id number]  Azael Alexandra MD Outpatient    5/31/2017  3:52 PM 6/2/2017 11:59 AM Full Code [id number]  Azael Alexandra MD Inpatient    12/28/2016  7:28 AM 5/31/2017  3:52 PM Full Code  [id number]  Kirk Pedroza MD Outpatient    12/22/2016  6:35 AM 12/28/2016  7:28 AM Full Code [id number]  Haile Small MD Inpatient    11/7/2016  4:50 PM 11/22/2016  6:15 PM Full Code [id number]  Chicho Rivero MD Inpatient    9/23/2016 11:00 PM 9/26/2016  6:08 PM Full Code [id number]  Olvin Boone MD Inpatient    9/17/2016  3:27 PM 9/18/2016  6:42 PM Full Code [id number]  Cait Winslow MD Inpatient    9/9/2016  9:29 AM 9/17/2016  3:27 PM Full Code [id number]  Christin Bullock PA Outpatient    9/7/2016  7:37 PM 9/9/2016  9:29 AM Full Code [id number]  Jamil Montes De Oca MD Inpatient    8/5/2016 11:41 AM 9/7/2016  7:37 PM Full Code [id number]  Shanell Bradshaw MD Outpatient    6/11/2016  2:15 PM 6/17/2016  2:48 PM Full Code [id number]  Yessica Traylor MD Inpatient    6/9/2016  2:39 PM 6/11/2016  2:15 PM Full Code [id number]  Ronald Fontana MD Inpatient    10/13/2015  8:51 AM 6/9/2016  2:39 PM Full Code [id number]  Dayna Ellis NP Outpatient    10/12/2015 11:53 PM 10/13/2015  8:51 AM Full Code [id number]  Ying Christian MD Inpatient    9/15/2015  9:34 AM 10/12/2015 11:53 PM Full Code [id number]  Paddy Foote MD Outpatient    3/5/2015  5:15 PM 9/15/2015  9:34 AM Full Code [id number]  Jennifer Radford MD Outpatient    3/3/2015  1:15 PM 3/5/2015  5:15 PM Full Code [id number]  Brandie Molina MD Inpatient    3/2/2015 12:53 PM 3/3/2015  1:15 PM Full Code [id number]  Cary Monreal NP Outpatient    3/1/2015  8:23 PM 3/2/2015 12:53 PM Full Code [id number]  Lucy Webb NP Inpatient    8/27/2014  9:31 AM 3/1/2015  8:23 PM Full Code [id number]  Ronnie Toth MD Outpatient    8/21/2014  4:12 PM 8/27/2014  9:31 AM Full Code [id number]  Jason Grossman MD Inpatient    6/29/2014  9:50 AM 8/21/2014  4:12 PM Full Code [id number]  Nicole Mccarty PA-C Outpatient    6/27/2014  6:57 PM 6/29/2014  9:50 AM Full Code [id number]  Prashanth Madsen NP  Inpatient    5/28/2014  1:18 PM 6/27/2014  6:57 PM Full Code [id number]  Emily Alarcon NP Outpatient    5/23/2014  2:07 PM 5/28/2014  1:18 PM Full Code [id number]  Casper Ma MD Inpatient    4/3/2014  7:40 AM 5/23/2014  2:07 PM Full Code [id number]  Boaz Kiser MD Outpatient    3/31/2014  6:52 PM 4/3/2014  7:40 AM Full Code [id number]  Boaz Kiser MD Inpatient    12/30/2013  2:11 PM 1/1/2014  4:12 PM Full Code [id number]  Tiny Quinteros RN Inpatient    11/9/2013  4:40 PM 12/30/2013  2:11 PM Full Code [id number]  Carrie Ramirez MD Outpatient    6/4/2013  8:58 AM 6/4/2013  5:36 PM Full Code [id number]  Lucy Franco PA-C Inpatient    5/31/2013 12:07 AM 6/4/2013  8:58 AM DNR/DNI [id number]  Rosalia Aldrich MD Inpatient         Medication Review      START taking        Dose / Directions Comments    levofloxacin 500 MG tablet   Commonly known as:  LEVAQUIN   Used for:  Urinary tract infection without hematuria, site unspecified        Dose:  500 mg   Take 1 tablet (500 mg) by mouth daily   Quantity:  7 tablet   Refills:  0          CONTINUE these medications which may have CHANGED, or have new prescriptions. If we are uncertain of the size of tablets/capsules you have at home, strength may be listed as something that might have changed.        Dose / Directions Comments    aspirin 81 MG EC tablet   This may have changed:  when to take this   Used for:  Unstable angina (H)        Dose:  81 mg   Take 1 tablet (81 mg) by mouth daily   Quantity:  90 tablet   Refills:  3        atorvastatin 40 MG tablet   Commonly known as:  LIPITOR   This may have changed:  when to take this   Used for:  Hyperlipidemia LDL goal <100        Dose:  40 mg   Take 1 tablet (40 mg) by mouth daily   Quantity:  90 tablet   Refills:  3        brimonidine 0.2 % ophthalmic solution   Commonly known as:  ALPHAGAN   This may have changed:  when to take this   Used for:  Primary open angle glaucoma of  both eyes, severe stage        Dose:  1 drop   Place 1 drop into the right eye 3 times daily   Quantity:  15 mL   Refills:  11        hydrochlorothiazide 12.5 MG capsule   Commonly known as:  MICROZIDE   This may have changed:  additional instructions   Used for:  Essential hypertension with goal blood pressure less than 130/80        Dose:  12.5 mg   Take 1 capsule (12.5 mg) by mouth daily   Quantity:  90 capsule   Refills:  3        Phenytoin Sodium Extended 200 MG Caps   This may have changed:  additional instructions   Used for:  Seizure disorder (H)        Dose:  200 mg   Take 200 mg by mouth 2 times daily   Quantity:  60 capsule   Refills:  0          CONTINUE these medications which have NOT CHANGED        Dose / Directions Comments    ACETAMINOPHEN PO        Dose:  1000 mg   Take 1,000 mg by mouth every 6 hours as needed for fever Taking q4-6 hours, per MAR   Refills:  0        ADVAIR DISKUS 250-50 MCG/DOSE diskus inhaler   Generic drug:  fluticasone-salmeterol        Dose:  1 puff   Inhale 1 puff into the lungs 2 times daily   Refills:  0        * albuterol (2.5 MG/3ML) 0.083% neb solution   Used for:  Chronic obstructive pulmonary disease, unspecified COPD type (H)        INHALE 1 VIAL VIA NEBULIZER EVERY 6 HOURS AS NEEDED   Quantity:  360 mL   Refills:  11        * albuterol 108 (90 BASE) MCG/ACT Inhaler   Commonly known as:  PROAIR HFA/PROVENTIL HFA/VENTOLIN HFA   Used for:  JOSE (obstructive sleep apnea)        Dose:  2 puff   Inhale 2 puffs into the lungs every 6 hours as needed for shortness of breath / dyspnea or wheezing   Quantity:  3 Inhaler   Refills:  1        blood glucose monitoring lancets   Used for:  Type 2 diabetes mellitus with diabetic peripheral angiopathy without gangrene, without long-term current use of insulin (H)        Use to test blood sugar 3 times daily or as directed.   Quantity:  100 each   Refills:  PRN        blood glucose monitoring meter device kit   Used for:  Type 2  diabetes, HbA1C goal < 8% (H)        Use to test blood sugars 3 times daily or as directed.   Quantity:  1 kit   Refills:  0        blood glucose monitoring test strip   Commonly known as:  ONETOUCH ULTRA   Used for:  Type 2 diabetes mellitus with diabetic peripheral angiopathy without gangrene, without long-term current use of insulin (H)        Use to test blood sugars 3 times daily or as directed.   Quantity:  3 Box   Refills:  3        calcium citrate-vitamin D 315-250 MG-UNIT Tabs per tablet   Commonly known as:  CITRACAL   Used for:  Osteoporosis        Dose:  2 tablet   Take 2 tablets by mouth daily   Quantity:  120 tablet   Refills:  5        cetirizine 10 MG tablet   Commonly known as:  zyrTEC   Used for:  Seasonal allergic rhinitis, unspecified allergic rhinitis trigger        Dose:  10 mg   Take 1 tablet (10 mg) by mouth daily as needed for allergies   Quantity:  90 tablet   Refills:  3        CYANOCOBALAMIN PO        Dose:  2000 mcg   Take 2,000 mcg by mouth daily   Refills:  0        cyclobenzaprine 10 MG tablet   Commonly known as:  FLEXERIL   Used for:  Cervicalgia        Dose:  10 mg   Take 1 tablet (10 mg) by mouth 2 times daily as needed for muscle spasms   Quantity:  30 tablet   Refills:  1        diclofenac 1 % Gel topical gel   Commonly known as:  VOLTAREN   Used for:  Primary osteoarthritis of both hands        Dose:  2 g   Apply 2 g topically 4 times daily to hands   Quantity:  100 g   Refills:  11        dorzolamide 2 % ophthalmic solution   Commonly known as:  TRUSOPT        Dose:  1 drop   Place 1 drop into both eyes 2 times daily   Refills:  0        dorzolamide-timolol 2-0.5 % ophthalmic solution   Commonly known as:  COSOPT   Used for:  Primary open angle glaucoma of both eyes, severe stage        Dose:  1 drop   Place 1 drop into the right eye 2 times daily   Quantity:  1 Bottle   Refills:  11        EPINEPHrine 0.15 MG/0.3ML injection 2-pack   Commonly known as:  EPIPEN    Used for:   Bee sting reaction, undetermined intent, subsequent encounter        Dose:  0.15 mg   Inject 0.3 mLs (0.15 mg) into the muscle as needed for anaphylaxis   Quantity:  0.6 mL   Refills:  1        escitalopram 5 MG tablet   Commonly known as:  LEXAPRO   Used for:  Adjustment disorder with depressed mood        Dose:  10 mg   Take 2 tablets (10 mg) by mouth daily   Quantity:  60 tablet   Refills:  5        estradiol 1 MG tablet   Commonly known as:  ESTRACE   Used for:  Person who has had sex change operation        Dose:  1 mg   Take 1 tablet (1 mg) by mouth daily   Quantity:  90 tablet   Refills:  3        ferrous sulfate 325 (65 FE) MG tablet   Commonly known as:  IRON        Dose:  325 mg   Take 1 tablet (325 mg) by mouth 2 times daily With meals   Quantity:  60 tablet   Refills:  2        fluticasone 50 MCG/ACT spray   Commonly known as:  FLONASE        Dose:  1 spray   Spray 1 spray into both nostrils daily   Refills:  0        INCRUSE ELLIPTA 62.5 MCG/INH oral inhaler   Generic drug:  umeclidinium        Dose:  1 puff   Inhale 1 puff into the lungs daily   Refills:  0        RENÉE Pack        Dose:  1 packet   Take 1 packet by mouth 2 times daily   Refills:  0        latanoprost 0.005 % ophthalmic solution   Commonly known as:  XALATAN   Used for:  Primary open angle glaucoma, stage unspecified        Dose:  1 drop   Place 1 drop into both eyes At Bedtime   Quantity:  2.5 mL   Refills:  11    Plz remind pt to f/u as scheduled, 3/25/16. Thanks!       levETIRAcetam 500 MG tablet   Commonly known as:  KEPPRA   Used for:  Nausea        Dose:  500 mg   Take 1 tablet (500 mg) by mouth 2 times daily   Quantity:  180 tablet   Refills:  1        lidocaine 5 % Patch   Commonly known as:  LIDODERM   Used for:  Chronic pain syndrome, 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 (H)        APPLY 1 TO 3 PATCHES TO PAINFUL AREA AT ONCE FOR UP TO 12 HOURS WITHIN A 24 HOUR PERIOD REMOVE AFTER 12 HOURS   Quantity:   30 patch   Refills:  5        lisinopril 10 MG tablet   Commonly known as:  PRINIVIL/ZESTRIL   Used for:  Essential hypertension with goal blood pressure less than 130/80        Dose:  10 mg   Take 1 tablet (10 mg) by mouth daily   Quantity:  90 tablet   Refills:  3        MEDICATION GIVEN BY INTRATHECAL PUMP - INSTRUCTION        continuous May 19, 2017 - per [facility] in [place] [phone]: Conc: Bupivacaine 20 mg/mL and Fentanyl 2000 mcg/mL. Continuous: Bupivacaine 7.265 mg/day and Fentanyl 726.5 mcg/day. Boluses: Up to 7 boluses per 24-hr period of Bupivicaine 0.599 mg and Fentanyl 59.9 mcg  Pump Refill Date at [facility]: 7/2/17   Refills:  0        metFORMIN 500 MG 24 hr tablet   Commonly known as:  GLUCOPHAGE-XR   Used for:  Type 2 diabetes mellitus with diabetic peripheral angiopathy and gangrene, without long-term current use of insulin (H)        Dose:  500 mg   Take 1 tablet (500 mg) by mouth daily (with dinner)   Quantity:  90 tablet   Refills:  3        metoprolol 25 MG 24 hr tablet   Commonly known as:  TOPROL-XL   Used for:  Old myocardial infarction        TAKE 1 TABLET (25 MG) BY MOUTH DAILY   Quantity:  30 tablet   Refills:  10        MULTIVITAMIN PO        Dose:  1 tablet   Take 1 tablet by mouth daily   Refills:  0        nitroGLYcerin 0.4 MG sublingual tablet   Commonly known as:  NITROSTAT   Used for:  Chronic systolic congestive heart failure (H), Old myocardial infarction        Dose:  0.4 mg   Place 1 tablet (0.4 mg) under the tongue every 5 minutes as needed for chest pain if you are still having symptoms after 3 doses (15 minutes) call 911.   Quantity:  25 tablet   Refills:  1        ONDANSETRON PO        Dose:  4 mg   Take 4 mg by mouth 3 times daily   Refills:  0        * order for DME   Used for:  Incontinence        Equipment being ordered: Depends briefs   Quantity:  1 Month   Refills:  11        * order for DME   Used for:  CVA (cerebral  infarction), HTN (hypertension)        Equipment being ordered: Power Wheelchair   Quantity:  1 Device   Refills:  0        * order for DME   Used for:  Type 2 diabetes mellitus with other diabetic neurological complication        Equipment being ordered: Glucerna daily shakes with each meal   Quantity:  1 Box   Refills:  11        * order for DME   Used for:  Simple chronic bronchitis (H)        Equipment being ordered: Nebulizer and tubing supplies   Quantity:  1 Units   Refills:  0        * order for DME   Used for:  Chronic obstructive pulmonary disease, unspecified COPD type (H)        Equipment being ordered: CPAP supplies mask, hose, filters, cushion fax to [facility] at [phone] Disp: 10 DeviceRefills: prn Class: Local PrintStart: 2/10/2017   Quantity:  1 Device   Refills:  0        * order for DME   Used for:  COPD (chronic obstructive pulmonary disease) (H)        Equipment being ordered: CPAP supplies mask, hose, filters, cushion  fax to [facility] at [phone]   Quantity:  10 Device   Refills:  prn        * order for DME   Used for:  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 (H)        Hospital bed with side rails   Quantity:  1 Device   Refills:  0        * order for DME   Used for:  Status post bilateral above knee amputation (H)        Full electric hospital bed with half rails  Dx: K08745, I110, J449 Length of need: lifetime   Quantity:  1 Device   Refills:  0        * order for DME   Used for:  Status post bilateral above knee amputation (H)        Wheel Chair Cushion: 18 x 18 inch Roho cushion   Quantity:  1 Device   Refills:  0        pantoprazole 40 MG EC tablet   Commonly known as:  PROTONIX   Used for:  Gastroesophageal reflux disease without esophagitis        Dose:  40 mg   Take 1 tablet (40 mg) by mouth 2 times daily   Quantity:  30 tablet   Refills:  1        polyethylene glycol Packet   Commonly known as:  MIRALAX/GLYCOLAX        Dose:  17 g   Take 17 g by  mouth daily as needed Dissolved in water or juice   Refills:  0        pregabalin 75 MG capsule   Commonly known as:  LYRICA   Used for:  Pain in both upper extremities        Dose:  150 mg   Take 2 capsules (150 mg) by mouth 2 times daily   Quantity:  120 capsule   Refills:  5        prochlorperazine 10 MG tablet   Commonly known as:  COMPAZINE   Used for:  Nausea with vomiting        Dose:  10 mg   Take 1 tablet (10 mg) by mouth every 8 hours as needed   Quantity:  20 tablet   Refills:  0        risperiDONE 0.5 MG tablet   Commonly known as:  risperDAL        Dose:  0.5 mg   Take 0.5 mg by mouth At Bedtime   Refills:  0        sucralfate 1 GM tablet   Commonly known as:  CARAFATE   Used for:  Adjustment disorder with depressed mood        Dose:  1 g   Take 1 tablet (1 g) by mouth 4 times daily May dissolve in 10 mL water is necessary. (Start upon completion of carafate suspension)   Quantity:  120 tablet   Refills:  11        traZODone 100 MG tablet   Commonly known as:  DESYREL   Used for:  Anxiety state        Dose:  150 mg   Take 1.5 tablets (150 mg) by mouth At Bedtime   Quantity:  90 tablet   Refills:  3        vitamin D 2000 UNITS tablet        Dose:  2000 Units   Take 2,000 Units by mouth daily.   Quantity:  100 tablet   Refills:  3        zinc 50 MG Tabs        Dose:  1 tablet   Take 1 tablet by mouth daily   Refills:  0        * Notice:  This list has 11 medication(s) that are the same as other medications prescribed for you. Read the directions carefully, and ask your doctor or other care provider to review them with you.            Summary of Visit     Reason for your hospital stay       Nausea, vomiting, and abdominal pain             After Care     Diet       Follow this diet upon discharge: soft diet as instructed by GI, same as prior to admission       Discharge Instructions       You were admitted to ED observation for nausea, vomiting, and abdominal pain.   Your urine showed signs of a UTI, which  may be the cause for your symptoms.  Your other labs and tests were all reassuring and within normal limits.   You received 1 dose of IV antibiotics for treatment of your UTI. You received IV rehydration and anti-nausea medication, which helped to clear up your symptoms.   You received 1 dose of senna to help promote a bowel movement. Please take Miralax daily at home until having soft stools.  Take Levaquin once daily with food x 7 days at home for ongoing treatment of UTI.  Push clear liquid intake at home.     See your PCP if again developing nausea, vomiting, or abdominal pain, or if other new symptoms.  Return to ED immediately if bloody vomit, bloody or black stools, severe abdominal pain, chest pain, shortness of breath, inability to tolerate oral intake, or any other new concerning symptoms.             Your next 10 appointments already scheduled     Dec 29, 2017 10:00 AM CST   (Arrive by 9:45 AM)   New Patient Visit with VICTOR M Floyd CNP   [facility] ([facility])    [address] [place] [address]  4th Floor  [facility] [address]   [phone]            Feb 19, 2018  9:30 AM CST   (Arrive by 9:15 AM)   Return Visit with Alina Jennings MD   [facility] ([facility])    [address] [facility] [address] [facility] [address] [facility]  [address] [facility] [address]   [phone]            Mar 06, 2018  9:15 AM CST   VISUAL FIELD with [facility] EYE VISUAL FIELD   Eye Clinic ([facility])    [address]  [address]   [facility] [address] [place] [id number]   [phone]            Mar 06, 2018  9:45 AM CST   RETURN GLAUCOMA with Marely Robin MD   Eye Clinic ([facility])    [address]  [address]  [facility] [address] [place] [id number]   [phone]            May 11, 2018 10:50 AM CDT   (Arrive by 10:35 AM)   RETURN DIABETES with Michelle Irizarry MD     [facility] ([facility])    [address]  3rd Floor  [facility] [address] [phone]              Follow-Up Appointment Instructions     Future Labs/Procedures    Follow Up and recommended labs and tests     Comments:    Follow up with primary care provider, Allan Casey, within 7 days for hospital follow- up.  Labs and testing at discretion of your PCP.      Follow-Up Appointment Instructions     Follow Up and recommended labs and tests       Follow up with primary care provider, Allan Casey, within 7 days for hospital follow- up.  Labs and testing at discretion of your PCP.             Statement of Approval     Ordered          12/06/17 1745  I have reviewed and agree with all the recommendations and orders detailed in this document.  EFFECTIVE NOW     Approved and electronically signed by:  Polly Thomas PA-C

## 2017-12-05 NOTE — IP AVS SNAPSHOT
"` `           UNIT [address]D OBSERVATION [facility]: [phone]                                              INTERAGENCY TRANSFER FORM - NURSING   2017                    Hospital Admission Date: 2017  SHARATH MERCEDES   : 1950  Sex: Female        Attending Provider: (none)    Allergies:  Bee Venom, Penicillins, Dilantin [Phenytoin], Iodide, Iodine-131, Novocaine [Procaine], Tositumomab    Infection:  None   Service:  EMERGENCY ME    Ht:  1.702 m (5' 7\")   Wt:  62.1 kg (137 lb)   Admission Wt:  62.1 kg (137 lb)    BMI:  21.46 kg/m 2   BSA:  1.71 m 2            Patient PCP Information     Provider PCP Type    Allan Casey MD General      Current Code Status     Date Active Code Status Order ID Comments User Context       Prior      Code Status History     Date Active Date Inactive Code Status Order ID Comments User Context    2017  5:33 PM  Full Code [id number]  Polly Thomas PA-C Outpatient    2017  2:07 AM 2017  5:33 PM Full Code [id number]  Cary Dick MD ED    2017 10:05 AM 2017  2:07 AM Full Code [id number]  Leo Meyer, VICTOR M CNP Outpatient    2017 12:15 AM 2017 10:05 AM DNR [id number]  Syed Kruse APRN CNP Inpatient    10/8/2017  9:55 PM 10/11/2017  6:56 PM DNR/DNI [id number]  Sissy Miles MD Inpatient    2017 11:59 AM 10/8/2017  9:55 PM Full Code [id number]  Azael Alexandra MD Outpatient    2017  3:52 PM 2017 11:59 AM Full Code [id number]  Azael Alexandra MD Inpatient    2016  7:28 AM 2017  3:52 PM Full Code [id number]  Kirk Pedroza MD Outpatient    2016  6:35 AM 2016  7:28 AM Full Code [id number]  Haile Small MD Inpatient    2016  4:50 PM 2016  6:15 PM Full Code [id number]  Chicho Rivero MD Inpatient    2016 11:00 PM 2016  6:08 PM Full Code [id number]  Olvin Boone MD Inpatient    2016  3:27 PM 2016  6:42 PM Full Code [id number]  " Cait Winslow MD Inpatient    9/9/2016  9:29 AM 9/17/2016  3:27 PM Full Code [id number]  Christin Bullock PA Outpatient    9/7/2016  7:37 PM 9/9/2016  9:29 AM Full Code [id number]  Jamil Montes De Oca MD Inpatient    8/5/2016 11:41 AM 9/7/2016  7:37 PM Full Code [id number]  Shanell Bradshaw MD Outpatient    6/11/2016  2:15 PM 6/17/2016  2:48 PM Full Code [id number]  Yessica Traylor MD Inpatient    6/9/2016  2:39 PM 6/11/2016  2:15 PM Full Code [id number]  Ronald Fontana MD Inpatient    10/13/2015  8:51 AM 6/9/2016  2:39 PM Full Code [id number]  Dayna Ellis, NP Outpatient    10/12/2015 11:53 PM 10/13/2015  8:51 AM Full Code [id number]  Ying Christian MD Inpatient    9/15/2015  9:34 AM 10/12/2015 11:53 PM Full Code [id number]  Paddy Foote MD Outpatient    3/5/2015  5:15 PM 9/15/2015  9:34 AM Full Code [id number]  Jennifer Radford MD Outpatient    3/3/2015  1:15 PM 3/5/2015  5:15 PM Full Code [id number]  Brandie Molina MD Inpatient    3/2/2015 12:53 PM 3/3/2015  1:15 PM Full Code [id number]  Cary Monreal NP Outpatient    3/1/2015  8:23 PM 3/2/2015 12:53 PM Full Code [id number]  Lucy Webb NP Inpatient    8/27/2014  9:31 AM 3/1/2015  8:23 PM Full Code [id number]  Ronnie Toth MD Outpatient    8/21/2014  4:12 PM 8/27/2014  9:31 AM Full Code [id number]  Jason Grossman MD Inpatient    6/29/2014  9:50 AM 8/21/2014  4:12 PM Full Code [id number]  Nicole Mccarty PA-C Outpatient    6/27/2014  6:57 PM 6/29/2014  9:50 AM Full Code [id number]  Prashanth Madsen, NP Inpatient    5/28/2014  1:18 PM 6/27/2014  6:57 PM Full Code [id number]  Emily Alarcon, NP Outpatient    5/23/2014  2:07 PM 5/28/2014  1:18 PM Full Code [id number]  Casper Ma MD Inpatient    4/3/2014  7:40 AM 5/23/2014  2:07 PM Full Code [id number]  Boaz Kiser MD Outpatient    3/31/2014  6:52 PM 4/3/2014  7:40 AM Full Code [id number]  Boaz Kiser MD  Inpatient    12/30/2013  2:11 PM 1/1/2014  4:12 PM Full Code [id number]  Tiny Quinteros, EDWARDO Inpatient    11/9/2013  4:40 PM 12/30/2013  2:11 PM Full Code [id number]  Carrie Ramirez MD Outpatient    6/4/2013  8:58 AM 6/4/2013  5:36 PM Full Code [id number]  Lucy Franco PA-C Inpatient    5/31/2013 12:07 AM 6/4/2013  8:58 AM DNR/DNI [id number]  Rosalia Aldrich MD Inpatient      Advance Directives        Does patient have a scanned Advance Directive/ACP document in EPIC?           Yes        Hospital Problems as of 12/6/2017              Priority Class Noted POA    Nausea & vomiting Medium  12/6/2017 Yes      Non-Hospital Problems as of 12/6/2017              Priority Class Noted    Asthma Medium  4/4/2003    Open-angle glaucoma Medium  8/11/2003    Old myocardial infarction Medium  8/2/2005    Iron deficiency anemia Medium  11/18/2005    Degeneration of intervertebral disc of lumbosacral region Medium  11/18/2005    Cerebral infarction due to occlusion or stenosis of carotid artery Medium  2/6/2006    Late effect of stroke Medium  7/13/2006    Disorder of bone and cartilage Medium  5/24/2007    Osteopenia Medium  5/24/2007    Single seizure (H) Medium  5/24/2007    Prediabetes Medium  2/26/2008    Chronic pain syndrome Medium  3/20/2009    Thoracic or lumbosacral neuritis or radiculitis Medium  3/20/2009    Lumbosacral radiculitis Medium  3/20/2009    Androgen insensitivity syndrome Medium  3/23/2009    Testicular feminization Medium  3/23/2009    Hereditary and idiopathic peripheral neuropathy Medium  7/10/2009    Peripheral neuropathy Medium  7/10/2009    Hx of colonic polyp Medium  7/13/2009    Anxiety disorder due to general medical condition Medium  7/29/2009    Central retinal artery occlusion Medium  8/19/2009    Adjustment disorder with depressed mood Medium  9/16/2009    Hyperlipidemia LDL goal <100 Medium  9/4/2012    Seizure disorder (H) Medium  9/4/2012    ACP (advance care planning)  Medium  9/13/2012    Chronic obstructive pulmonary disease, unspecified COPD type (H) Medium  1/1/2013    Osteoporosis Medium  1/21/2013    Schizoaffective disorder (H) Medium  6/7/2013    AS (sickle cell trait) (H) Medium  10/8/2013    Vertigo Medium  11/8/2013    Person who has had sex change operation Medium  1/20/2014    Claudication in peripheral vascular disease (H) Medium  4/22/2014    Intestinal malabsorption Medium  8/6/2014    GIB (gastrointestinal bleeding) Medium  8/21/2014    Cervicalgia Medium  1/15/2015    Health Care Home Medium  5/4/2015    PAD (peripheral artery disease) (H) Medium  9/14/2015    Chronic systolic congestive heart failure (H) Medium  3/8/2016    Stenosis of carotid artery Medium  4/1/2016    Osteoarthritis Medium  5/19/2016    Pain in both upper extremities Medium  5/19/2016    Atherosclerotic peripheral vascular disease with rest pain (H) Medium  6/6/2016    Dysphagia Medium  8/9/2016    Essential hypertension Medium  9/2/2016    Cellulitis of right ankle Medium  9/7/2016    Angina pectoris, crescendo (H) Medium  9/17/2016    Type 2 diabetes mellitus with diabetic peripheral angiopathy without gangrene, without long-term current use of insulin (H) Medium  10/12/2016    Anemia, unspecified type Medium  10/22/2016    Critical lower limb ischemia Medium  11/7/2016    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 (H) Medium  11/23/2016    Primary open angle glaucoma of both eyes, severe stage Medium  12/8/2016    Pseudophakia of right eye Medium  12/8/2016    Cataract, left eye Medium  12/8/2016    Diabetes mellitus type 2 without retinopathy (H) Medium  12/8/2016    Pyelonephritis Medium  12/22/2016    JOSE (obstructive sleep apnea) Medium  2/22/2017    Complex sleep apnea syndrome Medium  3/15/2017    Coronary artery disease of native artery of native heart with stable angina pectoris (H) Medium  4/4/2017    Hyperlipidemia Medium  4/4/2017    Ischemic cardiomyopathy Medium   4/4/2017    Bee sting reaction, undetermined intent, subsequent encounter Medium  4/6/2017    Functional incontinence Medium  7/19/2017    Personal history of urinary tract infection Medium  7/19/2017    Essential hypertension with goal blood pressure less than 130/80 Medium  9/27/2017    Hyperlipidemia LDL goal <70 Medium  9/27/2017    UTI (urinary tract infection) Medium  10/8/2017    Food impaction of esophagus Medium  11/26/2017    Esophageal foreign body Medium  11/27/2017      Immunizations     Name Date      Influenza (High Dose) 3 valent vaccine 09/06/17     Influenza (High Dose) 3 valent vaccine 09/03/16     Influenza (High Dose) 3 valent vaccine 09/21/15     Influenza (High Dose) 3 valent vaccine 08/29/15     Influenza (High Dose) 3 valent vaccine 09/27/14     Influenza (IIV3) PF 09/01/13     Influenza (IIV3) PF 10/01/12     Pneumococcal (PCV 13) 10/22/15     Pneumococcal 23 valent 02/22/14     Pneumococcal 23 valent 06/01/11     TD (ADULT, 7+) 01/01/11     TDAP Vaccine (Boostrix) 09/06/17          END      ASSESSMENT     Discharge Profile Flowsheet     DISCHARGE NEEDS ASSESSMENT     Existing Resources/Services  Home Care;Transportation Services 04/22/14 1127    Concerns To Be Addressed  denies needs/concerns at this time 04/22/14 1028   SKIN      Equipment Used at Home  bath bench;grab bar;power chair;walker, rolling;walker, standard 09/14/15 2125   Inspection of bony prominences  Full except (identify areas not inspected) 12/06/17 1527    GASTROINTESTINAL (ADULT,PEDIATRIC,OB)     Full except areas not inspected   Spine;Hip, left;Hip, right;Buttock, left;Buttock, right 12/06/17 1527    GI WDL  ex 12/06/17 1527   Skin WDL  ex 12/06/17 1527    Last Bowel Movement  12/04/17 12/06/17 1527   Skin Temperature  warm 12/06/17 1235    GI Signs/Symptoms  nausea 12/06/17 1527   Skin Moisture  dry 12/06/17 1235    COMMUNICATION ASSESSMENT     Skin Integrity  bruise(s);drain/device(s);scar(s) 12/06/17 1527     "Patient's communication style  spoken language (English or Bilingual) 12/05/17 1914   SAFETY      FINAL RESOURCES     Safety WDL  WDL 12/06/17 1527    Resources List  DME;Home Care;Assisted Living 10/09/17 1252   All Alarms  none present 12/06/17 1527    Other Resources  Home Care 10/09/17 1252                      Assessment WDL (Within Defined Limits) Definitions           Safety WDL     Effective: 09/28/15    Row Information: <b>WDL Definition:</b> Bed in low position, wheels locked; call light in reach; upper side rails up x 2; ID band on<br> <font color=\"gray\"><i>Item=AS safety wdl>>List=AS safety wdl>>Version=F14</i></font>      Skin WDL     Effective: 09/28/15    Row Information: <b>WDL Definition:</b> Warm; dry; intact; elastic; without discoloration; pressure points without redness<br> <font color=\"gray\"><i>Item=AS skin wdl>>List=AS skin wdl>>Version=F14</i></font>      Vitals     Vital Signs Flowsheet     VITAL SIGNS     CLINICALLY ALIGNED PAIN ASSESSMENT (CAPA) ([facility] ADULTS ONLY)      Temp  98.4  F (36.9  C) 12/06/17 1533   Comfort  tolerable with discomfort 12/06/17 0543    Temp src  Oral 12/06/17 1533   Change in Pain  getting worse 12/06/17 0422    Resp  17 12/06/17 1533   Pain Control  partially effective 12/06/17 0333    Heart Rate  59 12/06/17 1533   HEIGHT AND WEIGHT      Pulse/Heart Rate Source  Monitor 12/06/17 1533   Weight  62.1 kg (137 lb) 12/06/17 0623    BP  132/68 12/06/17 1533   Weight Method  Bed scale 12/06/17 0623    BP Location  Right arm 12/06/17 1533   PHOEBE COMA SCALE      OXYGEN THERAPY     Best Eye Response  4-->(E4) spontaneous 12/06/17 1527    SpO2  98 % 12/06/17 1533   Best Motor Response  6-->(M6) obeys commands 12/06/17 1527    O2 Device  None (Room air) 12/06/17 1533   Best Verbal Response  5-->(V5) oriented 12/06/17 1527    PAIN/COMFORT     Stover Coma Scale Score  15 12/06/17 1527    Patient Currently in Pain  yes 12/06/17 1524   POSITIONING      " Preferred Pain Scale  CAPA (Clinically Aligned Pain Assessment) ([facility] [facility], [facility] Adults Only) 12/06/17 1524   Body Position  supine, head elevated 12/06/17 1527    Pain Location  Abdomen 12/06/17 1524   Head of Bed (HOB)  HOB at 20-30 degrees 12/06/17 1527    Pain Orientation  Mid 12/06/17 1524   DAILY CARE      Pain Descriptors  Discomfort 12/06/17 1524   Activity Management  activity adjusted per tolerance 12/06/17 1527    Pain Intervention(s)  Medication (See eMAR) 12/06/17 1524   Activity Assistance Provided  assistance, 1 person 12/06/17 1527            Patient Lines/Drains/Airways Status    Active LINES/DRAINS/AIRWAYS     Name: Placement date: Placement time: Site: Days: Last dressing change:    Peripheral IV 12/06/17 Left Lower forearm 12/06/17   0323   Lower forearm   less than 1             Patient Lines/Drains/Airways Status    Active PICC/CVC     None            Intake/Output Detail Report     Date Intake   Output Net    Shift I.V. IV Piggyback Total Urine Total       Nai 12/05/17 0700 - 12/05/17 1459 -- -- -- -- -- 0    Noc 12/05/17 1500 - 12/05/17 2359 -- -- -- -- -- 0    Day 12/06/17 0000 - 12/06/17 0659 -- -- -- -- -- 0    Nai 12/06/17 0700 - 12/06/17 1459 -- -- -- 175 175 -175    Noc 12/06/17 1500 - 12/06/17 2359 -- -- -- -- -- 0      Case Management/Discharge Planning     Case Management/Discharge Planning Flowsheet     REFERRAL INFORMATION     Resources List  DME;Home Care;Assisted Living 10/09/17 1252    Arrived From  operating room 11/07/16 1902   Other Resources  Home Care 10/09/17 1252    LIVING ENVIRONMENT     Existing Resources/Services  Home Care;Transportation Services 04/22/14 1127    Lives With  facility resident 12/06/17 0617   / CAREGIVER      Living Arrangements  assisted living 10/10/17 0843   Filed Complexity Screen Score  14 12/06/17 0849    COPING/STRESS     ABUSE RISK SCREEN      Major Change/Loss/Stressor  death of a loved one 12/06/17 0618   QUESTION TO PATIENT:   Has a member of your family or a partner(now or in the past) intimidated, hurt, manipulated, or controlled you in any way?  no 12/06/17 0617    ASSESSMENT/CONCERNS TO BE ADDRESSED     QUESTION TO PATIENT: Do you feel safe going back to the place where you are living?  yes 12/06/17 0617    Concerns To Be Addressed  denies needs/concerns at this time 04/22/14 1028   OBSERVATION: Is there reason to believe there has been maltreatment of a vulnerable adult (ie. Physical/Sexual/Emotional abuse, self neglect, lack of adequate food, shelter, medical care, or financial exploitation)?  no 12/06/17 0617    DISCHARGE PLANNING     (R) MENTAL HEALTH SUICIDE RISK      Equipment Used at Home  bath bench;grab bar;power chair;walker, rolling;walker, standard 09/14/15 212   Are you depressed or being treated for depression?  Yes 12/06/17 0617    FINAL RESOURCES

## 2017-12-05 NOTE — IP AVS SNAPSHOT
"    UNIT 6D OBSERVATION [facility]: [phone]                                              INTERAGENCY TRANSFER FORM - LAB / IMAGING / EKG / EMG RESULTS   2017                    Hospital Admission Date: 2017  SHARATH MERCEDES   : 1950  Sex: Female        Attending Provider: (none)    Allergies:  Bee Venom, Penicillins, Dilantin [Phenytoin], Iodide, Iodine-131, Novocaine [Procaine], Tositumomab    Infection:  None   Service:  EMERGENCY ME    Ht:  1.702 m (5' 7\")   Wt:  62.1 kg (137 lb)   Admission Wt:  62.1 kg (137 lb)    BMI:  21.46 kg/m 2   BSA:  1.71 m 2            Patient PCP Information     Provider PCP Type    Allan Casey MD General         Lab Results - 3 Days      Glucose by meter [[id number]]  Resulted: 17 1546, Result status: Final result    Ordering provider: Hannah Horan MD  17 1536 Resulting lab: POINT OF CARE TEST, GLUCOSE    Specimen Information    Type Source Collected On     17 1536          Components       Value Reference Range Flag Lab   Glucose 89 70 - 99 mg/dL  170            Urine Culture Aerobic Bacterial [[id number]]  Resulted: 17 1051, Result status: Preliminary result    Ordering provider: Cary Dick MD  17 2302 Resulting lab: INFECTIOUS DISEASE DIAGNOSTIC LABORATORY    Specimen Information    Type Source Collected On   Midstream Urine  17 2302          Components       Value Reference Range Flag Lab   Specimen Description Midstream Urine      Special Requests Specimen received in preservative   75   Culture Micro Culture in progress   225            Comprehensive metabolic panel [[id number]] (Abnormal)  Resulted: 17 0911, Result status: Final result    Ordering provider: Polly Thomas PA-C  17 0830 Resulting lab: [facility]    Specimen Information    Type Source Collected On   Blood  17 0832          Components       Value Reference Range Flag Lab   Sodium " 139 133 - 144 mmol/L  51   Potassium 3.6 3.4 - 5.3 mmol/L  51   Chloride 106 94 - 109 mmol/L  51   Carbon Dioxide 26 20 - 32 mmol/L  51   Anion Gap 8 3 - 14 mmol/L  51   Glucose 85 70 - 99 mg/dL  51   Urea Nitrogen 8 7 - 30 mg/dL  51   Creatinine 0.43 0.52 - 1.04 mg/dL L 51   GFR Estimate >90 >60 mL/min/1.7m2  51   Comment:  Non  GFR Calc   GFR Estimate If Black >90 >60 mL/min/1.7m2  51   Comment:  African American GFR Calc   Calcium 9.2 8.5 - 10.1 mg/dL  51   Bilirubin Total 0.2 0.2 - 1.3 mg/dL  51   Albumin 2.9 3.4 - 5.0 g/dL L 51   Protein Total 7.8 6.8 - 8.8 g/dL  51   Alkaline Phosphatase 235 40 - 150 U/L H 51   ALT 45 0 - 50 U/L  51   AST 34 0 - 45 U/L  51            CBC with platelets differential [[id number]] (Abnormal)  Resulted: 12/06/17 0845, Result status: Final result    Ordering provider: Polly Thomas PA-C  12/06/17 0831 Resulting lab: [facility]    Specimen Information    Type Source Collected On   Blood  12/06/17 0832          Components       Value Reference Range Flag Lab   WBC 8.6 4.0 - 11.0 10e9/L  51   RBC Count 4.58 3.8 - 5.2 10e12/L  51   Hemoglobin 12.6 11.7 - 15.7 g/dL  51   Hematocrit 38.5 35.0 - 47.0 %  51   MCV 84 78 - 100 fl  51   MCH 27.5 26.5 - 33.0 pg  51   MCHC 32.7 31.5 - 36.5 g/dL  51   RDW 15.7 10.0 - 15.0 % H 51   Platelet Count 280 150 - 450 10e9/L  51   Diff Method Automated Method   51   % Neutrophils 74.7 %  51   % Lymphocytes 14.9 %  51   % Monocytes 9.3 %  51   % Eosinophils 0.8 %  51   % Basophils 0.1 %  51   % Immature Granulocytes 0.2 %  51   Nucleated RBCs 0 0 /100  51   Absolute Neutrophil 6.4 1.6 - 8.3 10e9/L  51   Absolute Lymphocytes 1.3 0.8 - 5.3 10e9/L  51   Absolute Monocytes 0.8 0.0 - 1.3 10e9/L  51   Absolute Eosinophils 0.1 0.0 - 0.7 10e9/L  51   Absolute Basophils 0.0 0.0 - 0.2 10e9/L  51   Abs Immature Granulocytes 0.0 0 - 0.4 10e9/L  51   Absolute Nucleated RBC 0.0   51            Glucose by meter  [[id number]] (Abnormal)  Resulted: 12/06/17 0821, Result status: Final result    Ordering provider: Hannah Horan MD  12/06/17 0622 Resulting lab: POINT OF CARE TEST, GLUCOSE    Specimen Information    Type Source Collected On     12/06/17 0622          Components       Value Reference Range Flag Lab   Glucose 117 70 - 99 mg/dL H 170            Magnesium [[id number]]  Resulted: 12/06/17 0451, Result status: Final result    Ordering provider: Cary Dick MD  12/05/17 1950 Resulting lab: [facility]    Specimen Information    Type Source Collected On     12/05/17 1950          Components       Value Reference Range Flag Lab   Magnesium 2.0 1.6 - 2.3 mg/dL  51            UA with Microscopic reflex to Culture [[id number]] (Abnormal)  Resulted: 12/05/17 2321, Result status: Final result    Ordering provider: Cary Dick MD  12/05/17 2000 Resulting lab: [facility]    Specimen Information    Type Source Collected On   Unspecified Urine  12/05/17 2302          Components       Value Reference Range Flag Lab   Color Urine Yellow   51   Appearance Urine Slightly Cloudy   51   Glucose Urine Negative NEG^Negative mg/dL  51   Bilirubin Urine Negative NEG^Negative  51   Ketones Urine Negative NEG^Negative mg/dL  51   Specific Gravity Urine 1.011 1.003 - 1.035  51   Blood Urine Trace NEG^Negative A 51   pH Urine 7.5 5.0 - 7.0 pH H 51   Protein Albumin Urine 10 NEG^Negative mg/dL A 51   Urobilinogen mg/dL Normal 0.0 - 2.0 mg/dL  51   Nitrite Urine Positive NEG^Negative A 51   Leukocyte Esterase Urine Negative NEG^Negative  51   Source Unspecified Urine   51   WBC Urine 4 0 - 2 /HPF H 51   RBC Urine 2 0 - 2 /HPF  51   Bacteria Urine Many NEG^Negative /HPF A 51   Squamous Epithelial /HPF Urine 3 0 - 1 /HPF H 51   Mucous Urine Present NEG^Negative /LPF A 51   Hyaline Casts 1 0 - 2 /LPF  51            Lipase [[id number]]  Resulted: 12/05/17 2036, Result  status: Final result    Ordering provider: Cary Dick MD  12/05/17 1933 Resulting lab: [facility]    Specimen Information    Type Source Collected On   Blood  12/05/17 1950          Components       Value Reference Range Flag Lab   Lipase 130 73 - 393 U/L  51            Troponin I [[id number]]  Resulted: 12/05/17 2036, Result status: Final result    Ordering provider: Cary Dick MD  12/05/17 1933 Resulting lab: [facility]    Specimen Information    Type Source Collected On   Blood  12/05/17 1950          Components       Value Reference Range Flag Lab   Troponin I ES <0.015 0.000 - 0.045 ug/L  51   Comment:         The 99th percentile for upper reference range is 0.045 ug/L.  Troponin values   in the range of 0.045 - 0.120 ug/L may be associated with risks of adverse   clinical events.              Comprehensive metabolic panel [[id number]] (Abnormal)  Resulted: 12/05/17 2036, Result status: Final result    Ordering provider: Cary Dick MD  12/05/17 1933 Resulting lab: [facility]    Specimen Information    Type Source Collected On   Blood  12/05/17 1950          Components       Value Reference Range Flag Lab   Sodium 140 133 - 144 mmol/L  51   Potassium 3.3 3.4 - 5.3 mmol/L L 51   Chloride 104 94 - 109 mmol/L  51   Carbon Dioxide 31 20 - 32 mmol/L  51   Anion Gap 6 3 - 14 mmol/L  51   Glucose 92 70 - 99 mg/dL  51   Urea Nitrogen 8 7 - 30 mg/dL  51   Creatinine 0.46 0.52 - 1.04 mg/dL L 51   GFR Estimate >90 >60 mL/min/1.7m2  51   Comment:  Non  GFR Calc   GFR Estimate If Black >90 >60 mL/min/1.7m2  51   Comment:  African American GFR Calc   Calcium 9.6 8.5 - 10.1 mg/dL  51   Bilirubin Total 0.1 0.2 - 1.3 mg/dL L 51   Albumin 3.1 3.4 - 5.0 g/dL L 51   Protein Total 8.8 6.8 - 8.8 g/dL  51   Alkaline Phosphatase 246 40 - 150 U/L H 51   ALT 43 0 - 50 U/L  51   AST 29 0 - 45 U/L  51             INR [[id number]]  Resulted: 12/05/17 2021, Result status: Final result    Ordering provider: Cary Dick MD  12/05/17 1933 Resulting lab: [facility]    Specimen Information    Type Source Collected On   Blood  12/05/17 1950          Components       Value Reference Range Flag Lab   INR 1.07 0.86 - 1.14  51            Lactic acid whole blood [[id number]]  Resulted: 12/05/17 2005, Result status: In process    Ordering provider: Cary Dick MD  12/05/17 1959 Resulting lab: [facility]    Specimen Information    Type Source Collected On     12/05/17 1959            CBC with platelets differential [[id number]] (Abnormal)  Resulted: 12/05/17 2002, Result status: Final result    Ordering provider: Cary Dick MD  12/05/17 1933 Resulting lab: [facility]    Specimen Information    Type Source Collected On   Blood  12/05/17 1950          Components       Value Reference Range Flag Lab   WBC 11.2 4.0 - 11.0 10e9/L H 51   RBC Count 4.70 3.8 - 5.2 10e12/L  51   Hemoglobin 13.0 11.7 - 15.7 g/dL  51   Hematocrit 39.7 35.0 - 47.0 %  51   MCV 85 78 - 100 fl  51   MCH 27.7 26.5 - 33.0 pg  51   MCHC 32.7 31.5 - 36.5 g/dL  51   RDW 15.4 10.0 - 15.0 % H 51   Platelet Count 319 150 - 450 10e9/L  51   Diff Method Automated Method   51   % Neutrophils 76.3 %  51   % Lymphocytes 12.1 %  51   % Monocytes 9.8 %  51   % Eosinophils 1.3 %  51   % Basophils 0.3 %  51   % Immature Granulocytes 0.2 %  51   Nucleated RBCs 0 0 /100  51   Absolute Neutrophil 8.5 1.6 - 8.3 10e9/L H 51   Absolute Lymphocytes 1.4 0.8 - 5.3 10e9/L  51   Absolute Monocytes 1.1 0.0 - 1.3 10e9/L  51   Absolute Eosinophils 0.2 0.0 - 0.7 10e9/L  51   Absolute Basophils 0.0 0.0 - 0.2 10e9/L  51   Abs Immature Granulocytes 0.0 0 - 0.4 10e9/L  51   Absolute Nucleated RBC 0.0   51            Testing Performed By     Lab - Abbreviation Name Director Address Valid Date Range    45 - SQA608 [facility] Unknown  "Unknown 01/28/02 0000 - Present    51 - Unknown [facility] Unknown [address] [facility] [address] 12/31/14 1010 - Present    75 - Unknown [facility] Unknown [address] [facility] [address] 01/15/15 1019 - Present    170 - Unknown POINT OF CARE TEST, GLUCOSE Unknown Unknown 10/31/11 1114 - Present    225 - Unknown INFECTIOUS DISEASE DIAGNOSTIC LABORATORY Unknown [address] [facility] [address] 12/19/14 0954 - Present            Unresulted Labs (24h ago through future)    Start       Ordered    Unscheduled  Potassium  (Potassium Replacement - \"Standard\" - For K levels less than 3.4 mmol/L - UU,UR,UA,RH,SH,PH,WY )  CONDITIONAL (SPECIFY),   Routine     Comments:  Obtain Potassium Level for these conditions:  *IF no potassium result within 24 hours before initiation of order set, draw potassium level with next lab collect.    *2 HOURS AFTER last IV potassium replacement dose and 4 hours after an oral replacement dose.  *Next morning after potassium dose.     Repeat Potassium Replacement if necessary.    12/06/17 0412         Imaging Results - 3 Days      CT Head w/o Contrast [[id number]]  Resulted: 12/06/17 0752, Result status: Final result    Ordering provider: Cary Dick MD  12/05/17 2152 Resulted by: Ronald Bal MD Nigam, Rishi, MD    Performed: 12/05/17 2226 - 12/05/17 2257 Resulting lab: RADIOLOGY RESULTS    Narrative:       CT HEAD W/O CONTRAST 12/5/2017 10:57 PM    History: headache;     Comparison: CT 4/12/2017.    Technique: Using multidetector thin collimation helical acquisition  technique, axial, coronal and sagittal CT images from the skull base  to the vertex were obtained without intravenous contrast.     Findings:    There is no evidence of intracranial hemorrhage, mass effect, midline  shift, or abnormal extraaxial fluid collection. Unchanged age-related  calcifications of basal ganglia. The ventricles and " sulci are within  normal limits for age. Mild patchy periventricular white matter  hypodensities most consistent with chronic small vessel ischemic  disease. Gray-white differentiation is intact throughout both cerebral  hemispheres.  The bony calvaria and the bones of the skull base appear  normal.  The visualized mastoid air cells and paranasal sinuses are  clear.       Impression:       Impression:   1. No acute intracranial pathology  2. Age-related bilateral basal ganglia calcifications are unchanged.    I have personally reviewed the examination and initial interpretation  and I agree with the findings.    TESSIE LOPEZ MD      CT Abdomen Pelvis w/o Contrast [[id number]]  Resulted: 12/06/17 0709, Result status: Final result    Ordering provider: Cary Dick MD  12/05/17 2153 Resulted by: George Atkinson MD Nigam, Rishi, MD    Performed: 12/05/17 2225 - 12/05/17 2256 Resulting lab: RADIOLOGY RESULTS    Narrative:       Exam: CT abdomen pelvis without contrast 12/5/2017 10:56 PM.    History: Abdominal pain, nausea vomiting.  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    Technique: CT images from the lung bases through the symphysis pubis  without contrast    Findings:   Cholecystectomy. Mild reservoir effect of the common bile duct. Trace  pneumobilia, also seen on prior exam. This is likely postprocedural.  Otherwise the liver, pancreas, spleen, kidneys, and adrenal glands are  unremarkable. No abnormally dilated loops of small bowel or colon. No  free air or free fluid. No abdominal or pelvic lymphadenopathy. Small  hiatal hernia. Right inguinal  postsurgical changes. Moderate aorto  iliac calcification. Bilateral stents in the common iliac arteries and  external iliac arteries. Patency of the vasculature cannot be  evaluated without contrast. Right buttocks neurostimulator device with  catheters in the spinal canal, tip not visualized.    The visualized lung bases are clear. No suspicious bony lesions.  Degenerative findings of the spine. Unchanged healing left-sided rib  fracture.      Impression:       Impression:   1. New acute findings in the abdomen or pelvis.  2. Stable post procedural pneumobilia.    I have personally reviewed the examination and initial interpretation  and I agree with the findings.    JM DAMIAN MD      Testing Performed By     Lab - Abbreviation Name Director Address Valid Date Range    104 - Rad [facility] RADIOLOGY RESULTS Unknown Unknown 02/16/05 1553 - Present            Encounter-Level Documents:     There are no encounter-level documents.      Order-Level Documents:     There are no order-level documents.

## 2017-12-05 NOTE — IP AVS SNAPSHOT
` `     UNIT 6D OBSERVATION [facility] BANK: [phone]            Medication Administration Report for Sonya Foote as of 12/06/17 1855   Legend:    Given Hold Not Given Due Canceled Entry Other Actions    Time Time (Time) Time  Time-Action       Inactive    Active    Linked        Medications 11/30/17 12/01/17 12/02/17 12/03/17 12/04/17 12/05/17 12/06/17    0.9% sodium chloride infusion  Rate: 125 mL/hr Freq: CONTINUOUS Route: IV  Start: 12/05/17 2113         2148 (1,000 mL)-New Bag        0044-Stopped       0445 ( )-Restarted       0543-ED Infusing on Admission/transfer       0700 ( )-Restarted       0800 ( )-Rate/Dose Verify       0900 ( )-Rate/Dose Verify       1000 ( )-Rate/Dose Verify       1010 ( )-New Bag       1100 ( )-Rate/Dose Verify       1200 ( )-Rate/Dose Verify       1300 ( )-Rate/Dose Verify       1400 ( )-Rate/Dose Verify       1500 ( )-Rate/Dose Verify       1600 ( )-Rate/Dose Verify       1700 ( )-Rate/Dose Verify           acetaminophen (TYLENOL) tablet 650 mg  Dose: 650 mg Freq: EVERY 4 HOURS PRN Route: PO  PRN Reason: mild pain  Start: 12/06/17 0204   Admin Instructions: Alternate ibuprofen (if ordered) with acetaminophen.  Maximum acetaminophen dose from all sources = 75 mg/kg/day not to exceed 4 grams/day.           1044 (650 mg)-Given       1518 (650 mg)-Given           albuterol neb solution 2.5 mg  Dose: 2.5 mg Freq: EVERY 4 HOURS PRN Route: NEBULIZATION  PRN Reason: wheezing  Start: 12/06/17 1008              aspirin EC EC tablet 81 mg  Dose: 81 mg Freq: DAILY Route: PO  Start: 12/06/17 1015   Admin Instructions: DO NOT CRUSH.           (1112)-Not Given           budesonide (PULMICORT) neb solution 0.5 mg  Dose: 0.5 mg Freq: 2 TIMES DAILY Route: NEBULIZATION  Start: 12/06/17 1015          (1130)-Not Given       [ ] 2000           cyclobenzaprine (FLEXERIL) tablet 10 mg  Dose: 10 mg Freq: 2 TIMES DAILY PRN Route: PO  PRN Reason: muscle spasms  Start: 12/06/17 100               escitalopram (LEXAPRO) tablet 10 mg  Dose: 10 mg Freq: DAILY Route: PO  Start: 12/06/17 1015          (1112)-Not Given           estradiol (ESTRACE) tablet 1 mg  Dose: 1 mg Freq: DAILY Route: PO  Start: 12/06/17 1015          1150 (1 mg)-Given           glucose 40 % gel 15-30 g  Dose: 15-30 g Freq: EVERY 15 MIN PRN Route: PO  PRN Reason: low blood sugar  Start: 12/06/17 1032   Admin Instructions: Give 15 g for BG 51 to 69 mg/dL IF patient is conscious and able to swallow. Give 30 g for BG less than or equal to 50 mg/dL IF patient is conscious and able to swallow. Do NOT give glucose gel via enteral tube.  IF patient has enteral tube: give apple juice 120 mL (4 oz or 15 g of CHO) via enteral tube for BG 51 to 69 mg/dL.  Give apple juice 240 mL (8 oz or 30 g of CHO) via enteral tube for BG less than or equal to 50 mg/dL.    ~Oral gel is preferable for conscious and able to swallow patient.   ~IF gel unavailable or patient refuses may provide apple juice 120 mL (4 oz or 15 g of CHO). Document juice on I and O flowsheet.              Or  dextrose 50 % injection 25-50 mL  Dose: 25-50 mL Freq: EVERY 15 MIN PRN Route: IV  PRN Reason: low blood sugar  Start: 12/06/17 1032   Admin Instructions: Use if have IV access, BG less than 70 mg/dL and meet dose criteria below:  Dose if conscious and alert (or disorientated) and NPO = 25 mL  Dose if unconscious / not alert = 50 mL  Vesicant. For ordered doses up to 25 mg, give IV Push undiluted. Give each 5g over 1 minute.              Or  glucagon injection 1 mg  Dose: 1 mg Freq: EVERY 15 MIN PRN Route: SC  PRN Reason: low blood sugar  PRN Comment: May repeat x 1 only  Start: 12/06/17 1032   Admin Instructions: May give SQ or IM. ONLY use glucagon IF patient has NO IV access AND is UNABLE to swallow AND blood glucose is LESS than or EQUAL to 50 mg/dL.  Give IV Push over 1 minute. Reconstitute with 1mL sterile water.               hydrochlorothiazide (MICROZIDE) capsule 12.5 mg  Dose:  12.5 mg Freq: DAILY Route: PO  Start: 12/06/17 1015          1044 (12.5 mg)-Given           ipratropium - albuterol 0.5 mg/2.5 mg/3 mL (DUONEB) neb solution 3 mL  Dose: 3 mL Freq: 4 TIMES DAILY Route: NEBULIZATION  Start: 12/06/17 1200          1130 (3 mL)-Given       1538 (3 mL)-Given       [ ] 2000           levETIRAcetam (KEPPRA) tablet 500 mg  Dose: 500 mg Freq: 2 TIMES DAILY Route: PO  Start: 12/06/17 1015          1044 (500 mg)-Given       [ ] 2000           lidocaine (LIDODERM) 5 % Patch 1 patch  Dose: 1 patch Freq: EVERY 24 HOURS 0800 Route: TD  Start: 12/06/17 1015   Admin Instructions: Apply patch(s) to back as needed. To prevent lidocaine toxicity, patient should be patch free for 12 hrs daily. Patches may be cut to smaller size prior to removing release liner.  NEVER APPLY HEAT OVER PATCH which will increase absorption and may lead to risk of local anesthetic toxicity.  Do not apply over area where liposomal bupivacaine was injected for 96 hours post injection.           1053 (1 patch)-Given           lidocaine (LIDODERM) patch in PLACE  Freq: EVERY 8 HOURS Route: TD  Start: 12/06/17 1030   Admin Instructions: Chart every shift, confirming that patch is still in place on patient (no barcode scan needed). See patch order for dose information.  NEVER APPLY HEAT OVER PATCH which will increase absorption and may lead to risk of local anesthetic toxicity. Do not apply over area where liposomal bupivacaine injected for 96 hours.           1112 ( )-Patch in Place       [ ] 1830           lidocaine (LIDODERM) patch REMOVAL  Freq: EVERY 24 HOURS 2000 Route: TD  Start: 12/06/17 2000   Admin Instructions: Patient should have a 12 hour patch free interval           [ ] 2000           lisinopril (PRINIVIL/ZESTRIL) tablet 10 mg  Dose: 10 mg Freq: DAILY Route: PO  Start: 12/06/17 1015          1044 (10 mg)-Given           Medication given by intrathecal pump: This is NOT an order to dispense medication. For information  only.  Freq: CONTINUOUS Route: XX  Start: 12/06/17 1200   Admin Instructions: Fill in all known information    Medications in Pump: fentanyl bupivicaine  Clinic Responsible for pump medications: pain specialists in [place]  Refill date: 7/2/2017    Okay to use own pump with own pain dosing    If information will need to be obtained at a later time, indicate the following:  * Department or individual who will follow up  * If family will be bringing information, or if a call to a clinic is needed.  If needed, [employer] may be able to supply the name of the physician who implanted the patient's pump.    Modify this order to add or update these instructions.           1237 ( )-Rate/Dose Verify [C]       1406 ( )-Rate/Dose Verify       1520 ( )-Rate/Dose Verify       1600 ( )-Rate/Dose Verify       1700 ( )-Rate/Dose Verify           metFORMIN (GLUCOPHAGE-XR) 24 hr tablet 500 mg  Dose: 500 mg Freq: DAILY WITH SUPPER Route: PO  Start: 12/06/17 1700   Admin Instructions: Do not crush.  If the patient receives intravenous, iodinated contrast and patient GFR is greater than 60 ml/min, continue metformin.  Contact provider for 'hold' or 'no hold' instructions if no GFR or if GFR is less than 60 ml/min.           1651 (500 mg)-Given           metoprolol (TOPROL-XL) 24 hr tablet 25 mg  Dose: 25 mg Freq: DAILY Route: PO  Start: 12/06/17 1015   Admin Instructions: DO NOT CRUSH. Tablet may be split in half along score line.           1036-Hold [C]           naloxone (NARCAN) injection 0.1-0.4 mg  Dose: 0.1-0.4 mg Freq: EVERY 2 MIN PRN Route: IV  PRN Reason: opioid reversal  Start: 12/06/17 0204   Admin Instructions: For respiratory rate LESS than or EQUAL to 8.  Partial reversal dose:  0.1 mg titrated q 2 minutes for Analgesia Side Effects Monitoring Sedation Level of 3 (frequently drowsy, arousable, drifts to sleep during conversation).Full reversal dose:  0.4 mg bolus for Analgesia Side Effects Monitoring Sedation Level of 4  (somnolent, minimal or no response to stimulation).  For ordered doses up to 2mg give IVP. Give each 0.4mg over 15 seconds in emergency situations. For non-emergent situations further dilute in 9mL of NS to facilitate titration of response.               ondansetron (ZOFRAN-ODT) ODT tab 4 mg  Dose: 4 mg Freq: EVERY 6 HOURS PRN Route: PO  PRN Reasons: nausea,vomiting  Start: 12/06/17 0204   Admin Instructions: This is Step 1 of nausea and vomiting management.  If nausea not resolved in 15 minutes, go to Step 2 prochlorperazine (COMPAZINE). Do not push through foil backing. Peel back foil and gently remove. Place on tongue immediately. Administration with liquid unnecessary           0230 (4 mg)-Given                        Or  ondansetron (ZOFRAN) injection 4 mg  Dose: 4 mg Freq: EVERY 6 HOURS PRN Route: IV  PRN Reasons: nausea,vomiting  Start: 12/06/17 0204   Admin Instructions: This is Step 1 of nausea and vomiting management.  If nausea not resolved in 15 minutes, go to Step 2 prochlorperazine (COMPAZINE).  Irritant. For ordered doses up to 4 mg, give IV Push undiluted over 2-5 minutes.                  0958 (4 mg)-Given       1515 (4 mg)-Given           pantoprazole (PROTONIX) EC tablet 40 mg  Dose: 40 mg Freq: 2 TIMES DAILY Route: PO  Start: 12/06/17 1015   Admin Instructions: DO NOT CRUSH.           (1112)-Not Given       [ ] 2000           phenytoin (DILANTIN) CR capsule 200 mg  Dose: 200 mg Freq: 2 TIMES DAILY Route: PO  Start: 12/06/17 1015          1149 (200 mg)-Given       [ ] 2000           polyethylene glycol (MIRALAX/GLYCOLAX) Packet 17 g  Dose: 17 g Freq: DAILY PRN Route: PO  PRN Reason: constipation  Start: 12/06/17 1008   Admin Instructions: 1 Packet = 17 grams. Mixed prescribed dose in 8 ounces of water. Follow with 8 oz. of water.           1435 (17 g)-Given           potassium chloride 10 mEq in 100 mL sterile water intermittent infusion (premix)  Dose: 10 mEq Freq: EVERY 1 HOUR PRN Route: IV  PRN  Reason: potassium supplementation  Start: 12/06/17 0411   Admin Instructions: Infuse via PERIPHERAL LINE or CENTRAL LINE. Use for central line replacement if patient weight less than 65 kg, if patient is on TPN with high potassium content or if unit does not stock 20 mEq bags.   If Serum K+ 3.0-3.3, dose = 10 mEq/hr x4 doses (40 mEq IV total dose). Recheck K+ level 2 hours after dose and the next AM.   If Serum K+ less than 3.0, dose = 10 mEq/hr x6 doses (60 mEq IV total dose). Recheck K+ level 2 hours after dose and the next AM.               pregabalin (LYRICA) capsule 150 mg  Dose: 150 mg Freq: 2 TIMES DAILY Route: PO  Start: 12/06/17 1015          1044 (150 mg)-Given       [ ] 2000           senna-docusate (SENOKOT-S;PERICOLACE) 8.6-50 MG per tablet 1 tablet  Dose: 1 tablet Freq: ONCE Route: PO  Start: 12/06/17 1000          (1112)-Not Given           sucralfate (CARAFATE) tablet 1 g  Dose: 1 g Freq: 4 TIMES DAILY Route: PO  Start: 12/06/17 1200   Admin Instructions: Recommended to take before meals.           1150 (1 g)-Given       1518 (1 g)-Given       [ ] 2000          Future Medications  Medications 11/30/17 12/01/17 12/02/17 12/03/17 12/04/17 12/05/17 12/06/17       atorvastatin (LIPITOR) tablet 40 mg  Dose: 40 mg Freq: AT BEDTIME Route: PO  Start: 12/06/17 2200          [ ] 2200           risperiDONE (risperDAL) tablet 0.5 mg  Dose: 0.5 mg Freq: AT BEDTIME Route: PO  Start: 12/06/17 2200          [ ] 2200           traZODone (DESYREL) tablet 150 mg  Dose: 150 mg Freq: AT BEDTIME Route: PO  Start: 12/06/17 2200          [ ] 2200          Completed Medications  Medications 11/30/17 12/01/17 12/02/17 12/03/17 12/04/17 12/05/17 12/06/17         Dose: 650 mg Freq: ONCE Route: PO  Start: 12/06/17 0525   End: 12/06/17 0532   Admin Instructions: Maximum acetaminophen dose from all sources = 75 mg/kg/day not to exceed 4 grams/day.           0532 (650 mg)-Given             Dose: 650 mg Freq: ONCE Route: PO  Start:  12/05/17 2250   End: 12/05/17 2302   Admin Instructions: Maximum acetaminophen dose from all sources = 75 mg/kg/day not to exceed 4 grams/day.          2302 (650 mg)-Given              Dose: 500 mg Freq: ONCE Route: IV  Indications of Use: URINARY TRACT INFECTION  Last Dose: Stopped (12/06/17 0544)  Start: 12/06/17 0525   End: 12/06/17 0544   Admin Instructions: Irritant. Administer at a rate of no greater than 100 mL/hr           0537 (500 mg)-New Bag       0544-ED Infusing on Admission/transfer             Dose: 5 mg Freq: ONCE Route: IV  Start: 12/05/17 2250   End: 12/05/17 2304   Admin Instructions: Avoid use if patient has full bowel obstruction or perforation. Irritant. For ordered doses up to 10 mg, give IV Push undiluted over 2 minutes.          2302 (5 mg)-Given           Discontinued Medications  Medications 11/30/17 12/01/17 12/02/17 12/03/17 12/04/17 12/05/17 12/06/17         Dose: 1 drop Freq: 3 TIMES DAILY Route: RIGHT EYE  Start: 12/06/17 1400   End: 12/06/17 1030          1030-Med Discontinued         Dose: 1 drop Freq: 2 TIMES DAILY Route: Both Eyes  Start: 12/06/17 1015   End: 12/06/17 1030                 1030-Med Discontinued         Dose: 1 drop Freq: 2 TIMES DAILY Route: RIGHT EYE  Start: 12/06/17 1015   End: 12/06/17 1031                 1031-Med Discontinued         Dose: 1 drop Freq: AT BEDTIME Route: Both Eyes  Start: 12/06/17 2200   End: 12/06/17 1031   Admin Instructions: Refrigerated product.           1031-Med Discontinued

## 2017-12-05 NOTE — IP AVS SNAPSHOT
MRN:[id number]                      After Visit Summary   12/5/2017    Sonya Foote    MRN: [id number]           Thank you!     Thank you for choosing [facility] for your care. Our goal is always to provide you with excellent care. Hearing back from our patients is one way we can continue to improve our services. Please take a few minutes to complete the written survey that you may receive in the mail after you visit with us. Thank you!        Patient Information     Date Of Birth          1950        About your hospital stay     You were admitted on:  December 6, 2017 You last received care in the:  Unit 6D Observation [facility]    You were discharged on:  December 6, 2017        Reason for your hospital stay       Nausea, vomiting, and abdominal pain                  Who to Call     For medical emergencies, please call 911.  For non-urgent questions about your medical care, please call your primary care provider or clinic, [phone]          Attending Provider     Provider Specialty    Cary Dick MD Emergency Medicine    [facility], Hannah Karimi MD Emergency Medicine       Primary Care Provider Office Phone # Fax #    Allan Casey [phone]       When to contact your care team       See your PCP if again developing nausea, vomiting, or abdominal pain, or if other new symptoms.  Return to ED immediately if bloody vomit, bloody or black stools, severe abdominal pain, chest pain, shortness of breath, inability to tolerate oral intake, or any other new concerning symptoms.                  After Care Instructions     Diet       Follow this diet upon discharge: soft diet as instructed by GI, same as prior to admission            Discharge Instructions       You were admitted to ED observation for nausea, vomiting, and abdominal pain.   Your urine showed signs of a UTI, which may be the cause for your symptoms.  Your other labs and tests were all reassuring and within  normal limits.   You received 1 dose of IV antibiotics for treatment of your UTI. You received IV rehydration and anti-nausea medication, which helped to clear up your symptoms.   You received 1 dose of senna to help promote a bowel movement. Please take Miralax daily at home until having soft stools.  Take Levaquin once daily with food x 7 days at home for ongoing treatment of UTI.  Push clear liquid intake at home.     See your PCP if again developing nausea, vomiting, or abdominal pain, or if other new symptoms.  Return to ED immediately if bloody vomit, bloody or black stools, severe abdominal pain, chest pain, shortness of breath, inability to tolerate oral intake, or any other new concerning symptoms.                  Follow-up Appointments     Follow Up and recommended labs and tests       Follow up with primary care provider, Allan Casey, within 7 days for hospital follow- up.  Labs and testing at discretion of your PCP.                  Your next 10 appointments already scheduled     Dec 29, 2017 10:00 AM CST   (Arrive by 9:45 AM)   New Patient Visit with VICTOR M Floyd [facility] ([facility])    [address] [place] [address]  [address] [facility] [address]   [phone]            Feb 19, 2018  9:30 AM CST   (Arrive by 9:15 AM)   Return Visit with Alina Jennings MD   [facility] ([facility])    [address] [place] [address]  [address] [facility] [address]   [phone]            Mar 06, 2018  9:15 AM CST   VISUAL FIELD with [facility] EYE VISUAL FIELD   Eye Clinic ([facility])    [facility]  [address] [place] [address]   [phone]            Mar 06, 2018  9:45 AM CST   RETURN GLAUCOMA with Marely Robin MD   Eye Clinic ([facility])    [facility]  [address]  [facility]  "[id number]   [phone]            May 11, 2018 10:50 AM CDT   (Arrive by 10:35 AM)   RETURN DIABETES with Michelle Irizarry MD   [facility] ([facility] [place])    [address] [place] [address]   [phone]              Pending Results     Date and Time Order Name Status Description    2017 2302 Urine Culture Aerobic Bacterial Preliminary             Statement of Approval     Ordered          17 2430  I have reviewed and agree with all the recommendations and orders detailed in this document.  EFFECTIVE NOW     Approved and electronically signed by:  Polly Thomas PA-C             Admission Information     Date & Time Department Dept. Phone    2017 Unit 6D Observation [facility] [phone]      Your Vitals Were     Blood Pressure Temperature Respirations Weight Pulse Oximetry BMI (Body Mass Index)    132/68 (BP Location: Right arm) 98.4  F (36.9  C) (Oral) 17 62.1 kg (137 lb) 98% 21.46 kg/m2      MyChart Information     [employer] lets you send messages to your doctor, view your test results, renew your prescriptions, schedule appointments and more. To sign up, go to [email or web address] . Click on \"Log in\" on the left side of the screen, which will take you to the Welcome page. Then click on \"Sign up Now\" on the right side of the page.     You will be asked to enter the access code listed below, as well as some personal information. Please follow the directions to create your username and password.     Your access code is: [email or web address]  Expires: 2018 11:09 AM     Your access code will  in 90 days. If you need help or a new code, please call your [facility] clinic or [phone].        Care EveryWhere ID     This is your Care EveryWhere ID. This could be used by other organizations to access your [facility] medical records  [id number]        Equal Access to Services     KARI CARREON AH: John bowers " Sojoe, waaxda luqadaha, qaybta kaalmada shashank, tonie chaorossana cara. So [facility] [phone].    ATENCIÓN: Si papito snider, tiene a briones disposición servicios gratuitos de asistencia lingüística. Meño al [phone].    We comply with applicable federal civil rights laws and [place] laws. We do not discriminate on the basis of race, color, national origin, age, disability, sex, sexual orientation, or gender identity.               Review of your medicines      START taking        Dose / Directions    levofloxacin 500 MG tablet   Commonly known as:  LEVAQUIN   Used for:  Urinary tract infection without hematuria, site unspecified        Dose:  500 mg   Take 1 tablet (500 mg) by mouth daily   Quantity:  7 tablet   Refills:  0         CONTINUE these medicines which may have CHANGED, or have new prescriptions. If we are uncertain of the size of tablets/capsules you have at home, strength may be listed as something that might have changed.        Dose / Directions    aspirin 81 MG EC tablet   This may have changed:  when to take this   Used for:  Unstable angina (H)        Dose:  81 mg   Take 1 tablet (81 mg) by mouth daily   Quantity:  90 tablet   Refills:  3       atorvastatin 40 MG tablet   Commonly known as:  LIPITOR   This may have changed:  when to take this   Used for:  Hyperlipidemia LDL goal <100        Dose:  40 mg   Take 1 tablet (40 mg) by mouth daily   Quantity:  90 tablet   Refills:  3       brimonidine 0.2 % ophthalmic solution   Commonly known as:  ALPHAGAN   This may have changed:  when to take this   Used for:  Primary open angle glaucoma of both eyes, severe stage        Dose:  1 drop   Place 1 drop into the right eye 3 times daily   Quantity:  15 mL   Refills:  11       hydrochlorothiazide 12.5 MG capsule   Commonly known as:  MICROZIDE   This may have changed:  additional instructions   Used for:  Essential hypertension with goal blood pressure less than 130/80         Dose:  12.5 mg   Take 1 capsule (12.5 mg) by mouth daily   Quantity:  90 capsule   Refills:  3       Phenytoin Sodium Extended 200 MG Caps   This may have changed:  additional instructions   Used for:  Seizure disorder (H)        Dose:  200 mg   Take 200 mg by mouth 2 times daily   Quantity:  60 capsule   Refills:  0         CONTINUE these medicines which have NOT CHANGED        Dose / Directions    ACETAMINOPHEN PO        Dose:  1000 mg   Take 1,000 mg by mouth every 6 hours as needed for fever Taking q4-6 hours, per MAR   Refills:  0       ADVAIR DISKUS 250-50 MCG/DOSE diskus inhaler   Generic drug:  fluticasone-salmeterol        Dose:  1 puff   Inhale 1 puff into the lungs 2 times daily   Refills:  0       * albuterol (2.5 MG/3ML) 0.083% neb solution   Used for:  Chronic obstructive pulmonary disease, unspecified COPD type (H)        INHALE 1 VIAL VIA NEBULIZER EVERY 6 HOURS AS NEEDED   Quantity:  360 mL   Refills:  11       * albuterol 108 (90 BASE) MCG/ACT Inhaler   Commonly known as:  PROAIR HFA/PROVENTIL HFA/VENTOLIN HFA   Used for:  JOSE (obstructive sleep apnea)        Dose:  2 puff   Inhale 2 puffs into the lungs every 6 hours as needed for shortness of breath / dyspnea or wheezing   Quantity:  3 Inhaler   Refills:  1       blood glucose monitoring lancets   Used for:  Type 2 diabetes mellitus with diabetic peripheral angiopathy without gangrene, without long-term current use of insulin (H)        Use to test blood sugar 3 times daily or as directed.   Quantity:  100 each   Refills:  PRN       blood glucose monitoring meter device kit   Used for:  Type 2 diabetes, HbA1C goal < 8% (H)        Use to test blood sugars 3 times daily or as directed.   Quantity:  1 kit   Refills:  0       blood glucose monitoring test strip   Commonly known as:  ONETOUCH ULTRA   Used for:  Type 2 diabetes mellitus with diabetic peripheral angiopathy without gangrene, without long-term current use of insulin (H)        Use to test  blood sugars 3 times daily or as directed.   Quantity:  3 Box   Refills:  3       calcium citrate-vitamin D 315-250 MG-UNIT Tabs per tablet   Commonly known as:  CITRACAL   Used for:  Osteoporosis        Dose:  2 tablet   Take 2 tablets by mouth daily   Quantity:  120 tablet   Refills:  5       cetirizine 10 MG tablet   Commonly known as:  zyrTEC   Used for:  Seasonal allergic rhinitis, unspecified allergic rhinitis trigger        Dose:  10 mg   Take 1 tablet (10 mg) by mouth daily as needed for allergies   Quantity:  90 tablet   Refills:  3       CYANOCOBALAMIN PO        Dose:  2000 mcg   Take 2,000 mcg by mouth daily   Refills:  0       cyclobenzaprine 10 MG tablet   Commonly known as:  FLEXERIL   Used for:  Cervicalgia        Dose:  10 mg   Take 1 tablet (10 mg) by mouth 2 times daily as needed for muscle spasms   Quantity:  30 tablet   Refills:  1       diclofenac 1 % Gel topical gel   Commonly known as:  VOLTAREN   Used for:  Primary osteoarthritis of both hands        Dose:  2 g   Apply 2 g topically 4 times daily to hands   Quantity:  100 g   Refills:  11       dorzolamide 2 % ophthalmic solution   Commonly known as:  TRUSOPT        Dose:  1 drop   Place 1 drop into both eyes 2 times daily   Refills:  0       dorzolamide-timolol 2-0.5 % ophthalmic solution   Commonly known as:  COSOPT   Used for:  Primary open angle glaucoma of both eyes, severe stage        Dose:  1 drop   Place 1 drop into the right eye 2 times daily   Quantity:  1 Bottle   Refills:  11       EPINEPHrine 0.15 MG/0.3ML injection 2-pack   Commonly known as:  EPIPEN JR   Used for:  Bee sting reaction, undetermined intent, subsequent encounter        Dose:  0.15 mg   Inject 0.3 mLs (0.15 mg) into the muscle as needed for anaphylaxis   Quantity:  0.6 mL   Refills:  1       escitalopram 5 MG tablet   Commonly known as:  LEXAPRO   Used for:  Adjustment disorder with depressed mood        Dose:  10 mg   Take 2 tablets (10 mg) by mouth daily    Quantity:  60 tablet   Refills:  5       estradiol 1 MG tablet   Commonly known as:  ESTRACE   Used for:  Person who has had sex change operation        Dose:  1 mg   Take 1 tablet (1 mg) by mouth daily   Quantity:  90 tablet   Refills:  3       ferrous sulfate 325 (65 FE) MG tablet   Commonly known as:  IRON        Dose:  325 mg   Take 1 tablet (325 mg) by mouth 2 times daily With meals   Quantity:  60 tablet   Refills:  2       fluticasone 50 MCG/ACT spray   Commonly known as:  FLONASE        Dose:  1 spray   Spray 1 spray into both nostrils daily   Refills:  0       INCRUSE ELLIPTA 62.5 MCG/INH oral inhaler   Generic drug:  umeclidinium        Dose:  1 puff   Inhale 1 puff into the lungs daily   Refills:  0       RENÉE Pack        Dose:  1 packet   Take 1 packet by mouth 2 times daily   Refills:  0       latanoprost 0.005 % ophthalmic solution   Commonly known as:  XALATAN   Used for:  Primary open angle glaucoma, stage unspecified        Dose:  1 drop   Place 1 drop into both eyes At Bedtime   Quantity:  2.5 mL   Refills:  11       levETIRAcetam 500 MG tablet   Commonly known as:  KEPPRA   Used for:  Nausea        Dose:  500 mg   Take 1 tablet (500 mg) by mouth 2 times daily   Quantity:  180 tablet   Refills:  1       lidocaine 5 % Patch   Commonly known as:  LIDODERM   Used for:  Chronic pain syndrome, 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 (H)        APPLY 1 TO 3 PATCHES TO PAINFUL AREA AT ONCE FOR UP TO 12 HOURS WITHIN A 24 HOUR PERIOD REMOVE AFTER 12 HOURS   Quantity:  30 patch   Refills:  5       lisinopril 10 MG tablet   Commonly known as:  PRINIVIL/ZESTRIL   Used for:  Essential hypertension with goal blood pressure less than 130/80        Dose:  10 mg   Take 1 tablet (10 mg) by mouth daily   Quantity:  90 tablet   Refills:  3       MEDICATION GIVEN BY INTRATHECAL PUMP - INSTRUCTION        continuous May 19, 2017 - per [facility] [place] [phone]:  Conc: Bupivacaine 20 mg/mL and Fentanyl 2000 mcg/mL. Continuous: Bupivacaine 7.265 mg/day and Fentanyl 726.5 mcg/day. Boluses: Up to 7 boluses per 24-hr period of Bupivicaine 0.599 mg and Fentanyl 59.9 mcg  Pump Refill Date at [facility]: 7/2/17   Refills:  0       metFORMIN 500 MG 24 hr tablet   Commonly known as:  GLUCOPHAGE-XR   Used for:  Type 2 diabetes mellitus with diabetic peripheral angiopathy and gangrene, without long-term current use of insulin (H)        Dose:  500 mg   Take 1 tablet (500 mg) by mouth daily (with dinner)   Quantity:  90 tablet   Refills:  3       metoprolol 25 MG 24 hr tablet   Commonly known as:  TOPROL-XL   Used for:  Old myocardial infarction        TAKE 1 TABLET (25 MG) BY MOUTH DAILY   Quantity:  30 tablet   Refills:  10       MULTIVITAMIN PO        Dose:  1 tablet   Take 1 tablet by mouth daily   Refills:  0       nitroGLYcerin 0.4 MG sublingual tablet   Commonly known as:  NITROSTAT   Used for:  Chronic systolic congestive heart failure (H), Old myocardial infarction        Dose:  0.4 mg   Place 1 tablet (0.4 mg) under the tongue every 5 minutes as needed for chest pain if you are still having symptoms after 3 doses (15 minutes) call 911.   Quantity:  25 tablet   Refills:  1       ONDANSETRON PO        Dose:  4 mg   Take 4 mg by mouth 3 times daily   Refills:  0       * order for DME   Used for:  Incontinence        Equipment being ordered: Depends briefs   Quantity:  1 Month   Refills:  11       * order for DME   Used for:  CVA (cerebral infarction), HTN (hypertension)        Equipment being ordered: Power Wheelchair   Quantity:  1 Device   Refills:  0       * order for DME   Used for:  Type 2 diabetes mellitus with other diabetic neurological complication        Equipment being ordered: Glucerna daily shakes with each meal   Quantity:  1 Box   Refills:  11       * order for DME   Used for:  Simple chronic bronchitis (H)        Equipment being ordered: Nebulizer and tubing  supplies   Quantity:  1 Units   Refills:  0       * order for DME   Used for:  Chronic obstructive pulmonary disease, unspecified COPD type (H)        Equipment being ordered: CPAP supplies mask, hose, filters, cushion fax to [facility] at [phone] Disp: 10 DeviceRefills: prn Class: Local PrintStart: 2/10/2017   Quantity:  1 Device   Refills:  0       * order for DME   Used for:  COPD (chronic obstructive pulmonary disease) (H)        Equipment being ordered: CPAP supplies mask, hose, filters, cushion  fax to [facility] at [phone]   Quantity:  10 Device   Refills:  prn       * order for DME   Used for:  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 (H)        Hospital bed with side rails   Quantity:  1 Device   Refills:  0       * order for DME   Used for:  Status post bilateral above knee amputation (H)        Full electric hospital bed with half rails  Dx: W99026, I110, J449 Length of need: lifetime   Quantity:  1 Device   Refills:  0       * order for DME   Used for:  Status post bilateral above knee amputation (H)        Wheel Chair Cushion: 18 x 18 inch Roho cushion   Quantity:  1 Device   Refills:  0       pantoprazole 40 MG EC tablet   Commonly known as:  PROTONIX   Used for:  Gastroesophageal reflux disease without esophagitis        Dose:  40 mg   Take 1 tablet (40 mg) by mouth 2 times daily   Quantity:  30 tablet   Refills:  1       polyethylene glycol Packet   Commonly known as:  MIRALAX/GLYCOLAX        Dose:  17 g   Take 17 g by mouth daily as needed Dissolved in water or juice   Refills:  0       pregabalin 75 MG capsule   Commonly known as:  LYRICA   Used for:  Pain in both upper extremities        Dose:  150 mg   Take 2 capsules (150 mg) by mouth 2 times daily   Quantity:  120 capsule   Refills:  5       prochlorperazine 10 MG tablet   Commonly known as:  COMPAZINE   Used for:  Nausea with vomiting        Dose:  10 mg   Take 1 tablet (10 mg) by mouth every 8 hours as  needed   Quantity:  20 tablet   Refills:  0       risperiDONE 0.5 MG tablet   Commonly known as:  risperDAL        Dose:  0.5 mg   Take 0.5 mg by mouth At Bedtime   Refills:  0       sucralfate 1 GM tablet   Commonly known as:  CARAFATE   Used for:  Adjustment disorder with depressed mood        Dose:  1 g   Take 1 tablet (1 g) by mouth 4 times daily May dissolve in 10 mL water is necessary. (Start upon completion of carafate suspension)   Quantity:  120 tablet   Refills:  11       traZODone 100 MG tablet   Commonly known as:  DESYREL   Used for:  Anxiety state        Dose:  150 mg   Take 1.5 tablets (150 mg) by mouth At Bedtime   Quantity:  90 tablet   Refills:  3       vitamin D 2000 UNITS tablet        Dose:  2000 Units   Take 2,000 Units by mouth daily.   Quantity:  100 tablet   Refills:  3       zinc 50 MG Tabs        Dose:  1 tablet   Take 1 tablet by mouth daily   Refills:  0       * Notice:  This list has 11 medication(s) that are the same as other medications prescribed for you. Read the directions carefully, and ask your doctor or other care provider to review them with you.         Where to get your medicines      These medications were sent to [facility], [place] [id number] [address], [facility] [address]     Phone:  [phone]     levofloxacin 500 MG tablet               ANTIBIOTIC INSTRUCTION     You've Been Prescribed an Antibiotic - Now What?  Your healthcare team thinks that you or your loved one might have an infection. Some infections can be treated with antibiotics, which are powerful, life-saving drugs. Like all medications, antibiotics have side effects and should only be used when necessary. There are some important things you should know about your antibiotic treatment.      Your healthcare team may run tests before you start taking an antibiotic.    Your team may take samples (e.g., from your blood, urine or other areas)  to run tests to look for bacteria. These test can be important to determine if you need an antibiotic at all and, if you do, which antibiotic will work best.      Within a few days, your healthcare team might change or even stop your antibiotic.    Your team may start you on an antibiotic while they are working to find out what is making you sick.    Your team might change your antibiotic because test results show that a different antibiotic would be better to treat your infection.    In some cases, once your team has more information, they learn that you do not need an antibiotic at all. They may find out that you don't have an infection, or that the antibiotic you're taking won't work against your infection. For example, an infection caused by a virus can't be treated with antibiotics. Staying on an antibiotic when you don't need it is more likely to be harmful than helpful.      You may experience side effects from your antibiotic.    Like all medications, antibiotics have side effects. Some of these can be serious.    Let you healthcare team know if you have any known allergies when you are admitted to the hospital.    One significant side effect of nearly all antibiotics is the risk of severe and sometimes deadly diarrhea caused by Clostridium difficile (C. Difficile). This occurs when a person takes antibiotics because some good germs are destroyed. Antibiotic use allows C. diificile to take over, putting patients at high risk for this serious infection.    As a patient or caregiver, it is important to understand your or your loved one's antibiotic treatment. It is especially important for caregivers to speak up when patients can't speak for themselves. Here are some important questions to ask your healthcare team.    What infection is this antibiotic treating and how do you know I have that infection?    What side effects might occur from this antibiotic?    How long will I need to take this antibiotic?    Is  it safe to take this antibiotic with other medications or supplements (e.g., vitamins) that I am taking?     Are there any special directions I need to know about taking this antibiotic? For example, should I take it with food?    How will I be monitored to know whether my infection is responding to the antibiotic?    What tests may help to make sure the right antibiotic is prescribed for me?      Information provided by:  www.cdc.gov/getsmart  U.S. Department of Health and Human Services  Centers for disease Control and Prevention  National Center for Emerging and Zoonotic Infectious Diseases  Division of Healthcare Quality Promotion         Protect others around you: Learn how to safely use, store and throw away your medicines at www.disposemymeds.org.             Medication List: This is a list of all your medications and when to take them. Check marks below indicate your daily home schedule. Keep this list as a reference.      Medications           Morning Afternoon Evening Bedtime As Needed    ACETAMINOPHEN PO   Take 1,000 mg by mouth every 6 hours as needed for fever Taking q4-6 hours, per MAR   Last time this was given:  650 mg on 12/6/2017  3:18 PM                                ADVAIR DISKUS 250-50 MCG/DOSE diskus inhaler   Inhale 1 puff into the lungs 2 times daily   Generic drug:  fluticasone-salmeterol                                * albuterol (2.5 MG/3ML) 0.083% neb solution   INHALE 1 VIAL VIA NEBULIZER EVERY 6 HOURS AS NEEDED                                * albuterol 108 (90 BASE) MCG/ACT Inhaler   Commonly known as:  PROAIR HFA/PROVENTIL HFA/VENTOLIN HFA   Inhale 2 puffs into the lungs every 6 hours as needed for shortness of breath / dyspnea or wheezing                                aspirin 81 MG EC tablet   Take 1 tablet (81 mg) by mouth daily                                atorvastatin 40 MG tablet   Commonly known as:  LIPITOR   Take 1 tablet (40 mg) by mouth daily                                 blood glucose monitoring lancets   Use to test blood sugar 3 times daily or as directed.                                blood glucose monitoring meter device kit   Use to test blood sugars 3 times daily or as directed.                                blood glucose monitoring test strip   Commonly known as:  ONETOUCH ULTRA   Use to test blood sugars 3 times daily or as directed.                                brimonidine 0.2 % ophthalmic solution   Commonly known as:  ALPHAGAN   Place 1 drop into the right eye 3 times daily                                calcium citrate-vitamin D 315-250 MG-UNIT Tabs per tablet   Commonly known as:  CITRACAL   Take 2 tablets by mouth daily                                cetirizine 10 MG tablet   Commonly known as:  zyrTEC   Take 1 tablet (10 mg) by mouth daily as needed for allergies                                CYANOCOBALAMIN PO   Take 2,000 mcg by mouth daily                                cyclobenzaprine 10 MG tablet   Commonly known as:  FLEXERIL   Take 1 tablet (10 mg) by mouth 2 times daily as needed for muscle spasms                                diclofenac 1 % Gel topical gel   Commonly known as:  VOLTAREN   Apply 2 g topically 4 times daily to hands                                dorzolamide 2 % ophthalmic solution   Commonly known as:  TRUSOPT   Place 1 drop into both eyes 2 times daily                                dorzolamide-timolol 2-0.5 % ophthalmic solution   Commonly known as:  COSOPT   Place 1 drop into the right eye 2 times daily                                EPINEPHrine 0.15 MG/0.3ML injection 2-pack   Commonly known as:  EPIPEN JR   Inject 0.3 mLs (0.15 mg) into the muscle as needed for anaphylaxis                                escitalopram 5 MG tablet   Commonly known as:  LEXAPRO   Take 2 tablets (10 mg) by mouth daily                                estradiol 1 MG tablet   Commonly known as:  ESTRACE   Take 1 tablet (1 mg) by mouth daily   Last time  this was given:  1 mg on 12/6/2017 11:50 AM                                ferrous sulfate 325 (65 FE) MG tablet   Commonly known as:  IRON   Take 1 tablet (325 mg) by mouth 2 times daily With meals                                fluticasone 50 MCG/ACT spray   Commonly known as:  FLONASE   Spray 1 spray into both nostrils daily                                hydrochlorothiazide 12.5 MG capsule   Commonly known as:  MICROZIDE   Take 1 capsule (12.5 mg) by mouth daily   Last time this was given:  12.5 mg on 12/6/2017 10:44 AM                                INCRUSE ELLIPTA 62.5 MCG/INH oral inhaler   Inhale 1 puff into the lungs daily   Generic drug:  umeclidinium                                RENÉE Pack   Take 1 packet by mouth 2 times daily                                latanoprost 0.005 % ophthalmic solution   Commonly known as:  XALATAN   Place 1 drop into both eyes At Bedtime                                levETIRAcetam 500 MG tablet   Commonly known as:  KEPPRA   Take 1 tablet (500 mg) by mouth 2 times daily   Last time this was given:  500 mg on 12/6/2017 10:44 AM                                levofloxacin 500 MG tablet   Commonly known as:  LEVAQUIN   Take 1 tablet (500 mg) by mouth daily                                lidocaine 5 % Patch   Commonly known as:  LIDODERM   APPLY 1 TO 3 PATCHES TO PAINFUL AREA AT ONCE FOR UP TO 12 HOURS WITHIN A 24 HOUR PERIOD REMOVE AFTER 12 HOURS   Last time this was given:  1 patch on 12/6/2017 10:53 AM                                lisinopril 10 MG tablet   Commonly known as:  PRINIVIL/ZESTRIL   Take 1 tablet (10 mg) by mouth daily   Last time this was given:  10 mg on 12/6/2017 10:44 AM                                MEDICATION GIVEN BY INTRATHECAL PUMP - INSTRUCTION   continuous May 19, 2017 - per [facility] in [place] [phone]: Conc: Bupivacaine 20 mg/mL and Fentanyl 2000 mcg/mL. Continuous: Bupivacaine 7.265 mg/day and Fentanyl 726.5  mcg/day. Boluses: Up to 7 boluses per 24-hr period of Bupivicaine 0.599 mg and Fentanyl 59.9 mcg  Pump Refill Date at [facility]: 7/2/17   Last time this was given:  12/6/2017  5:00 PM                                metFORMIN 500 MG 24 hr tablet   Commonly known as:  GLUCOPHAGE-XR   Take 1 tablet (500 mg) by mouth daily (with dinner)   Last time this was given:  500 mg on 12/6/2017  4:51 PM                                metoprolol 25 MG 24 hr tablet   Commonly known as:  TOPROL-XL   TAKE 1 TABLET (25 MG) BY MOUTH DAILY                                MULTIVITAMIN PO   Take 1 tablet by mouth daily                                nitroGLYcerin 0.4 MG sublingual tablet   Commonly known as:  NITROSTAT   Place 1 tablet (0.4 mg) under the tongue every 5 minutes as needed for chest pain if you are still having symptoms after 3 doses (15 minutes) call 911.                                ONDANSETRON PO   Take 4 mg by mouth 3 times daily   Last time this was given:  4 mg on 12/6/2017  2:30 AM                                * order for DME   Equipment being ordered: Depends briefs                                * order for DME   Equipment being ordered: Power Wheelchair                                * order for DME   Equipment being ordered: Glucerna daily shakes with each meal                                * order for DME   Equipment being ordered: Nebulizer and tubing supplies                                * order for DME   Equipment being ordered: CPAP supplies mask, hose, filters, cushion fax to [facility] at [phone] Disp: 10 DeviceRefills: prn Class: Local PrintStart: 2/10/2017                                * order for DME   Equipment being ordered: CPAP supplies mask, hose, filters, cushion  fax to [facility] at [phone]                                * order for DME   Hospital bed with side rails                                * order for DME   Full electric hospital bed with half rails  Dx:  B87622, I110, J449 Length of need: lifetime                                * order for DME   Wheel Chair Cushion: 18 x 18 inch Roho cushion                                pantoprazole 40 MG EC tablet   Commonly known as:  PROTONIX   Take 1 tablet (40 mg) by mouth 2 times daily                                Phenytoin Sodium Extended 200 MG Caps   Take 200 mg by mouth 2 times daily   Last time this was given:  200 mg on 12/6/2017 11:49 AM                                polyethylene glycol Packet   Commonly known as:  MIRALAX/GLYCOLAX   Take 17 g by mouth daily as needed Dissolved in water or juice   Last time this was given:  17 g on 12/6/2017  2:35 PM                                pregabalin 75 MG capsule   Commonly known as:  LYRICA   Take 2 capsules (150 mg) by mouth 2 times daily   Last time this was given:  150 mg on 12/6/2017 10:44 AM                                prochlorperazine 10 MG tablet   Commonly known as:  COMPAZINE   Take 1 tablet (10 mg) by mouth every 8 hours as needed                                risperiDONE 0.5 MG tablet   Commonly known as:  risperDAL   Take 0.5 mg by mouth At Bedtime                                sucralfate 1 GM tablet   Commonly known as:  CARAFATE   Take 1 tablet (1 g) by mouth 4 times daily May dissolve in 10 mL water is necessary. (Start upon completion of carafate suspension)   Last time this was given:  1 g on 12/6/2017  3:18 PM                                traZODone 100 MG tablet   Commonly known as:  DESYREL   Take 1.5 tablets (150 mg) by mouth At Bedtime                                vitamin D 2000 UNITS tablet   Take 2,000 Units by mouth daily.                                zinc 50 MG Tabs   Take 1 tablet by mouth daily                                * Notice:  This list has 11 medication(s) that are the same as other medications prescribed for you. Read the directions carefully, and ask your doctor or other care provider to review them with you.

## 2017-12-06 ENCOUNTER — CARE COORDINATION (OUTPATIENT)
Dept: GERIATRIC MEDICINE | Facility: CLINIC | Age: 67
End: 2017-12-06

## 2017-12-06 VITALS
SYSTOLIC BLOOD PRESSURE: 132 MMHG | WEIGHT: 137 LBS | OXYGEN SATURATION: 98 % | BODY MASS INDEX: 21.46 KG/M2 | TEMPERATURE: 98.4 F | DIASTOLIC BLOOD PRESSURE: 68 MMHG | RESPIRATION RATE: 17 BRPM

## 2017-12-06 PROBLEM — R11.2 NAUSEA & VOMITING: Status: ACTIVE | Noted: 2017-12-06

## 2017-12-06 LAB
ALBUMIN SERPL-MCNC: 2.9 G/DL (ref 3.4–5)
ALP SERPL-CCNC: 235 U/L (ref 40–150)
ALT SERPL W P-5'-P-CCNC: 45 U/L (ref 0–50)
ANION GAP SERPL CALCULATED.3IONS-SCNC: 8 MMOL/L (ref 3–14)
AST SERPL W P-5'-P-CCNC: 34 U/L (ref 0–45)
BASOPHILS # BLD AUTO: 0 10E9/L (ref 0–0.2)
BASOPHILS NFR BLD AUTO: 0.1 %
BILIRUB SERPL-MCNC: 0.2 MG/DL (ref 0.2–1.3)
BUN SERPL-MCNC: 8 MG/DL (ref 7–30)
CALCIUM SERPL-MCNC: 9.2 MG/DL (ref 8.5–10.1)
CHLORIDE SERPL-SCNC: 106 MMOL/L (ref 94–109)
CO2 SERPL-SCNC: 26 MMOL/L (ref 20–32)
CREAT SERPL-MCNC: 0.43 MG/DL (ref 0.52–1.04)
DIFFERENTIAL METHOD BLD: ABNORMAL
EOSINOPHIL # BLD AUTO: 0.1 10E9/L (ref 0–0.7)
EOSINOPHIL NFR BLD AUTO: 0.8 %
ERYTHROCYTE [DISTWIDTH] IN BLOOD BY AUTOMATED COUNT: 15.7 % (ref 10–15)
GFR SERPL CREATININE-BSD FRML MDRD: >90 ML/MIN/1.7M2
GLUCOSE BLDC GLUCOMTR-MCNC: 117 MG/DL (ref 70–99)
GLUCOSE BLDC GLUCOMTR-MCNC: 89 MG/DL (ref 70–99)
GLUCOSE SERPL-MCNC: 85 MG/DL (ref 70–99)
HCT VFR BLD AUTO: 38.5 % (ref 35–47)
HGB BLD-MCNC: 12.6 G/DL (ref 11.7–15.7)
IMM GRANULOCYTES # BLD: 0 10E9/L (ref 0–0.4)
IMM GRANULOCYTES NFR BLD: 0.2 %
LYMPHOCYTES # BLD AUTO: 1.3 10E9/L (ref 0.8–5.3)
LYMPHOCYTES NFR BLD AUTO: 14.9 %
MAGNESIUM SERPL-MCNC: 2 MG/DL (ref 1.6–2.3)
MCH RBC QN AUTO: 27.5 PG (ref 26.5–33)
MCHC RBC AUTO-ENTMCNC: 32.7 G/DL (ref 31.5–36.5)
MCV RBC AUTO: 84 FL (ref 78–100)
MONOCYTES # BLD AUTO: 0.8 10E9/L (ref 0–1.3)
MONOCYTES NFR BLD AUTO: 9.3 %
NEUTROPHILS # BLD AUTO: 6.4 10E9/L (ref 1.6–8.3)
NEUTROPHILS NFR BLD AUTO: 74.7 %
NRBC # BLD AUTO: 0 10*3/UL
NRBC BLD AUTO-RTO: 0 /100
PLATELET # BLD AUTO: 280 10E9/L (ref 150–450)
POTASSIUM SERPL-SCNC: 3.6 MMOL/L (ref 3.4–5.3)
PROT SERPL-MCNC: 7.8 G/DL (ref 6.8–8.8)
RBC # BLD AUTO: 4.58 10E12/L (ref 3.8–5.2)
SODIUM SERPL-SCNC: 139 MMOL/L (ref 133–144)
WBC # BLD AUTO: 8.6 10E9/L (ref 4–11)

## 2017-12-06 PROCEDURE — 94640 AIRWAY INHALATION TREATMENT: CPT | Mod: 76

## 2017-12-06 PROCEDURE — 40000556 ZZH STATISTIC PERIPHERAL IV START W US GUIDANCE

## 2017-12-06 PROCEDURE — G0378 HOSPITAL OBSERVATION PER HR: HCPCS

## 2017-12-06 PROCEDURE — 80053 COMPREHEN METABOLIC PANEL: CPT | Performed by: PHYSICIAN ASSISTANT

## 2017-12-06 PROCEDURE — 96376 TX/PRO/DX INJ SAME DRUG ADON: CPT

## 2017-12-06 PROCEDURE — 36415 COLL VENOUS BLD VENIPUNCTURE: CPT | Performed by: PHYSICIAN ASSISTANT

## 2017-12-06 PROCEDURE — 94640 AIRWAY INHALATION TREATMENT: CPT

## 2017-12-06 PROCEDURE — 85025 COMPLETE CBC W/AUTO DIFF WBC: CPT | Performed by: PHYSICIAN ASSISTANT

## 2017-12-06 PROCEDURE — 25000132 ZZH RX MED GY IP 250 OP 250 PS 637: Performed by: EMERGENCY MEDICINE

## 2017-12-06 PROCEDURE — 25000128 H RX IP 250 OP 636: Performed by: EMERGENCY MEDICINE

## 2017-12-06 PROCEDURE — 99235 HOSP IP/OBS SAME DATE MOD 70: CPT | Mod: Z6 | Performed by: PHYSICIAN ASSISTANT

## 2017-12-06 PROCEDURE — 40000275 ZZH STATISTIC RCP TIME EA 10 MIN

## 2017-12-06 PROCEDURE — 96375 TX/PRO/DX INJ NEW DRUG ADDON: CPT | Performed by: EMERGENCY MEDICINE

## 2017-12-06 PROCEDURE — 25000132 ZZH RX MED GY IP 250 OP 250 PS 637: Performed by: PHYSICIAN ASSISTANT

## 2017-12-06 PROCEDURE — 25000125 ZZHC RX 250: Performed by: EMERGENCY MEDICINE

## 2017-12-06 PROCEDURE — 00000146 ZZHCL STATISTIC GLUCOSE BY METER IP

## 2017-12-06 PROCEDURE — 25000125 ZZHC RX 250: Performed by: PHYSICIAN ASSISTANT

## 2017-12-06 PROCEDURE — 96375 TX/PRO/DX INJ NEW DRUG ADDON: CPT

## 2017-12-06 RX ORDER — METFORMIN HCL 500 MG
500 TABLET, EXTENDED RELEASE 24 HR ORAL
Status: DISCONTINUED | OUTPATIENT
Start: 2017-12-06 | End: 2017-12-06 | Stop reason: HOSPADM

## 2017-12-06 RX ORDER — LEVOFLOXACIN 500 MG/1
500 TABLET, FILM COATED ORAL DAILY
Qty: 7 TABLET | Refills: 0 | Status: SHIPPED | OUTPATIENT
Start: 2017-12-06 | End: 2017-12-12

## 2017-12-06 RX ORDER — IPRATROPIUM BROMIDE AND ALBUTEROL SULFATE 2.5; .5 MG/3ML; MG/3ML
3 SOLUTION RESPIRATORY (INHALATION)
Status: DISCONTINUED | OUTPATIENT
Start: 2017-12-06 | End: 2017-12-06 | Stop reason: HOSPADM

## 2017-12-06 RX ORDER — ONDANSETRON 2 MG/ML
4 INJECTION INTRAMUSCULAR; INTRAVENOUS EVERY 6 HOURS PRN
Status: DISCONTINUED | OUTPATIENT
Start: 2017-12-06 | End: 2017-12-06 | Stop reason: HOSPADM

## 2017-12-06 RX ORDER — ACETAMINOPHEN 325 MG/1
650 TABLET ORAL ONCE
Status: COMPLETED | OUTPATIENT
Start: 2017-12-06 | End: 2017-12-06

## 2017-12-06 RX ORDER — BRIMONIDINE TARTRATE 2 MG/ML
1 SOLUTION/ DROPS OPHTHALMIC 3 TIMES DAILY
Status: DISCONTINUED | OUTPATIENT
Start: 2017-12-06 | End: 2017-12-06

## 2017-12-06 RX ORDER — ACETAMINOPHEN 325 MG/1
650 TABLET ORAL EVERY 4 HOURS PRN
Status: DISCONTINUED | OUTPATIENT
Start: 2017-12-06 | End: 2017-12-06 | Stop reason: HOSPADM

## 2017-12-06 RX ORDER — POTASSIUM CHLORIDE 7.45 MG/ML
10 INJECTION INTRAVENOUS
Status: DISCONTINUED | OUTPATIENT
Start: 2017-12-06 | End: 2017-12-06 | Stop reason: HOSPADM

## 2017-12-06 RX ORDER — DEXTROSE MONOHYDRATE 25 G/50ML
25-50 INJECTION, SOLUTION INTRAVENOUS
Status: DISCONTINUED | OUTPATIENT
Start: 2017-12-06 | End: 2017-12-06 | Stop reason: HOSPADM

## 2017-12-06 RX ORDER — DORZOLAMIDE HCL 20 MG/ML
1 SOLUTION/ DROPS OPHTHALMIC 2 TIMES DAILY
Status: DISCONTINUED | OUTPATIENT
Start: 2017-12-06 | End: 2017-12-06

## 2017-12-06 RX ORDER — LATANOPROST 50 UG/ML
1 SOLUTION/ DROPS OPHTHALMIC AT BEDTIME
Status: DISCONTINUED | OUTPATIENT
Start: 2017-12-06 | End: 2017-12-06

## 2017-12-06 RX ORDER — ALBUTEROL SULFATE 0.83 MG/ML
2.5 SOLUTION RESPIRATORY (INHALATION) EVERY 4 HOURS PRN
Status: DISCONTINUED | OUTPATIENT
Start: 2017-12-06 | End: 2017-12-06 | Stop reason: HOSPADM

## 2017-12-06 RX ORDER — NALOXONE HYDROCHLORIDE 0.4 MG/ML
.1-.4 INJECTION, SOLUTION INTRAMUSCULAR; INTRAVENOUS; SUBCUTANEOUS
Status: DISCONTINUED | OUTPATIENT
Start: 2017-12-06 | End: 2017-12-06 | Stop reason: HOSPADM

## 2017-12-06 RX ORDER — ESCITALOPRAM OXALATE 10 MG/1
10 TABLET ORAL DAILY
Status: DISCONTINUED | OUTPATIENT
Start: 2017-12-06 | End: 2017-12-06 | Stop reason: HOSPADM

## 2017-12-06 RX ORDER — HYDROCHLOROTHIAZIDE 12.5 MG/1
12.5 CAPSULE ORAL DAILY
Status: DISCONTINUED | OUTPATIENT
Start: 2017-12-06 | End: 2017-12-06 | Stop reason: HOSPADM

## 2017-12-06 RX ORDER — LIDOCAINE 50 MG/G
1 PATCH TOPICAL
Status: DISCONTINUED | OUTPATIENT
Start: 2017-12-06 | End: 2017-12-06 | Stop reason: HOSPADM

## 2017-12-06 RX ORDER — LISINOPRIL 10 MG/1
10 TABLET ORAL DAILY
Status: DISCONTINUED | OUTPATIENT
Start: 2017-12-06 | End: 2017-12-06 | Stop reason: HOSPADM

## 2017-12-06 RX ORDER — POLYETHYLENE GLYCOL 3350 17 G/17G
17 POWDER, FOR SOLUTION ORAL DAILY PRN
Status: DISCONTINUED | OUTPATIENT
Start: 2017-12-06 | End: 2017-12-06 | Stop reason: HOSPADM

## 2017-12-06 RX ORDER — PANTOPRAZOLE SODIUM 40 MG/1
40 TABLET, DELAYED RELEASE ORAL 2 TIMES DAILY
Status: DISCONTINUED | OUTPATIENT
Start: 2017-12-06 | End: 2017-12-06 | Stop reason: HOSPADM

## 2017-12-06 RX ORDER — ATORVASTATIN CALCIUM 40 MG/1
40 TABLET, FILM COATED ORAL AT BEDTIME
Status: DISCONTINUED | OUTPATIENT
Start: 2017-12-06 | End: 2017-12-06 | Stop reason: HOSPADM

## 2017-12-06 RX ORDER — CYCLOBENZAPRINE HCL 5 MG
10 TABLET ORAL 2 TIMES DAILY PRN
Status: DISCONTINUED | OUTPATIENT
Start: 2017-12-06 | End: 2017-12-06 | Stop reason: HOSPADM

## 2017-12-06 RX ORDER — METOPROLOL SUCCINATE 25 MG/1
25 TABLET, EXTENDED RELEASE ORAL DAILY
Status: DISCONTINUED | OUTPATIENT
Start: 2017-12-06 | End: 2017-12-06 | Stop reason: HOSPADM

## 2017-12-06 RX ORDER — ONDANSETRON 4 MG/1
4 TABLET, ORALLY DISINTEGRATING ORAL EVERY 6 HOURS PRN
Status: DISCONTINUED | OUTPATIENT
Start: 2017-12-06 | End: 2017-12-06 | Stop reason: HOSPADM

## 2017-12-06 RX ORDER — RISPERIDONE 0.5 MG/1
0.5 TABLET ORAL AT BEDTIME
Status: DISCONTINUED | OUTPATIENT
Start: 2017-12-06 | End: 2017-12-06 | Stop reason: HOSPADM

## 2017-12-06 RX ORDER — AMOXICILLIN 250 MG
1 CAPSULE ORAL ONCE
Status: DISCONTINUED | OUTPATIENT
Start: 2017-12-06 | End: 2017-12-06 | Stop reason: HOSPADM

## 2017-12-06 RX ORDER — SUCRALFATE 1 G/1
1 TABLET ORAL 4 TIMES DAILY
Status: DISCONTINUED | OUTPATIENT
Start: 2017-12-06 | End: 2017-12-06 | Stop reason: HOSPADM

## 2017-12-06 RX ORDER — LEVOFLOXACIN 5 MG/ML
500 INJECTION, SOLUTION INTRAVENOUS ONCE
Status: COMPLETED | OUTPATIENT
Start: 2017-12-06 | End: 2017-12-06

## 2017-12-06 RX ORDER — NICOTINE POLACRILEX 4 MG
15-30 LOZENGE BUCCAL
Status: DISCONTINUED | OUTPATIENT
Start: 2017-12-06 | End: 2017-12-06 | Stop reason: HOSPADM

## 2017-12-06 RX ORDER — ASPIRIN 81 MG/1
81 TABLET ORAL DAILY
Status: DISCONTINUED | OUTPATIENT
Start: 2017-12-06 | End: 2017-12-06 | Stop reason: HOSPADM

## 2017-12-06 RX ORDER — PHENYTOIN SODIUM 100 MG/1
200 CAPSULE, EXTENDED RELEASE ORAL 2 TIMES DAILY
Status: DISCONTINUED | OUTPATIENT
Start: 2017-12-06 | End: 2017-12-06 | Stop reason: HOSPADM

## 2017-12-06 RX ORDER — LEVETIRACETAM 250 MG/1
500 TABLET ORAL 2 TIMES DAILY
Status: DISCONTINUED | OUTPATIENT
Start: 2017-12-06 | End: 2017-12-06 | Stop reason: HOSPADM

## 2017-12-06 RX ORDER — DORZOLAMIDE HYDROCHLORIDE AND TIMOLOL MALEATE 20; 5 MG/ML; MG/ML
1 SOLUTION/ DROPS OPHTHALMIC 2 TIMES DAILY
Status: DISCONTINUED | OUTPATIENT
Start: 2017-12-06 | End: 2017-12-06

## 2017-12-06 RX ORDER — PREGABALIN 75 MG/1
150 CAPSULE ORAL 2 TIMES DAILY
Status: DISCONTINUED | OUTPATIENT
Start: 2017-12-06 | End: 2017-12-06 | Stop reason: HOSPADM

## 2017-12-06 RX ORDER — ESTRADIOL 1 MG/1
1 TABLET ORAL DAILY
Status: DISCONTINUED | OUTPATIENT
Start: 2017-12-06 | End: 2017-12-06 | Stop reason: HOSPADM

## 2017-12-06 RX ORDER — BUDESONIDE 0.5 MG/2ML
0.5 INHALANT ORAL 2 TIMES DAILY
Status: DISCONTINUED | OUTPATIENT
Start: 2017-12-06 | End: 2017-12-06 | Stop reason: HOSPADM

## 2017-12-06 RX ADMIN — ONDANSETRON 4 MG: 2 INJECTION INTRAMUSCULAR; INTRAVENOUS at 15:15

## 2017-12-06 RX ADMIN — HYDROCHLOROTHIAZIDE 12.5 MG: 12.5 CAPSULE ORAL at 10:44

## 2017-12-06 RX ADMIN — SUCRALFATE 1 G: 1 TABLET ORAL at 15:18

## 2017-12-06 RX ADMIN — SODIUM CHLORIDE: 9 INJECTION, SOLUTION INTRAVENOUS at 10:10

## 2017-12-06 RX ADMIN — ACETAMINOPHEN 650 MG: 325 TABLET, FILM COATED ORAL at 05:32

## 2017-12-06 RX ADMIN — LIDOCAINE 1 PATCH: 50 PATCH CUTANEOUS at 10:53

## 2017-12-06 RX ADMIN — IPRATROPIUM BROMIDE AND ALBUTEROL SULFATE 3 ML: .5; 3 SOLUTION RESPIRATORY (INHALATION) at 15:38

## 2017-12-06 RX ADMIN — ONDANSETRON 4 MG: 2 INJECTION INTRAMUSCULAR; INTRAVENOUS at 09:58

## 2017-12-06 RX ADMIN — IPRATROPIUM BROMIDE AND ALBUTEROL SULFATE 3 ML: .5; 3 SOLUTION RESPIRATORY (INHALATION) at 11:30

## 2017-12-06 RX ADMIN — ONDANSETRON 4 MG: 4 TABLET, ORALLY DISINTEGRATING ORAL at 02:30

## 2017-12-06 RX ADMIN — POLYETHYLENE GLYCOL 3350 17 G: 17 POWDER, FOR SOLUTION ORAL at 14:35

## 2017-12-06 RX ADMIN — LEVETIRACETAM 500 MG: 250 TABLET, FILM COATED ORAL at 10:44

## 2017-12-06 RX ADMIN — LISINOPRIL 10 MG: 10 TABLET ORAL at 10:44

## 2017-12-06 RX ADMIN — ACETAMINOPHEN 650 MG: 325 TABLET, FILM COATED ORAL at 10:44

## 2017-12-06 RX ADMIN — ACETAMINOPHEN 650 MG: 325 TABLET, FILM COATED ORAL at 15:18

## 2017-12-06 RX ADMIN — SUCRALFATE 1 G: 1 TABLET ORAL at 11:50

## 2017-12-06 RX ADMIN — EXTENDED PHENYTOIN SODIUM 200 MG: 100 CAPSULE ORAL at 11:49

## 2017-12-06 RX ADMIN — PREGABALIN 150 MG: 75 CAPSULE ORAL at 10:44

## 2017-12-06 RX ADMIN — METFORMIN HYDROCHLORIDE 500 MG: 500 TABLET, EXTENDED RELEASE ORAL at 16:51

## 2017-12-06 RX ADMIN — LEVOFLOXACIN 500 MG: 5 INJECTION, SOLUTION INTRAVENOUS at 05:37

## 2017-12-06 RX ADMIN — ESTRADIOL 1 MG: 1 TABLET ORAL at 11:50

## 2017-12-06 ASSESSMENT — PAIN DESCRIPTION - DESCRIPTORS
DESCRIPTORS: DISCOMFORT
DESCRIPTORS: DISCOMFORT

## 2017-12-06 ASSESSMENT — ENCOUNTER SYMPTOMS
FATIGUE: 1
DYSURIA: 1
ALLERGIC/IMMUNOLOGIC COMMENTS: MEDICATION ALLERGIES

## 2017-12-06 NOTE — ED PROVIDER NOTES
History     Chief Complaint   Patient presents with     Nausea & Vomiting     HPI  Sonya Foote is a 67 year old female with a history of schizoaffective disorder, gender change, DM2, diastolic CHF, asthma, COPD, seizure disorder, hypertension, MI, CVA (x3), bilateral BKA, dysphagia due to esophageal stricture as well as multiple other medical issues who presents via ambulance from her nursing facility for evaluation of nausea and vomiting.     Patient complains she has been feeling unwell with nausea for the past week, but today had multiple episodes of vomiting in addition to her nausea today. She also complains of hot/cold flashes, as well as a sharp frontal headache intermittently throughout the week, though acutely worse throughout the day today. She is on a soft diet, though has had decreased appetite throughout the week. She reports no changes in bowel movements from her baseline. Additionally, she has noted some dysuria as well as mild lower abdominal pain over the past week. She denies chest pain. She does have chronic shortness of breath secondary to COPD, but states this is unchanged from baseline. No other symptoms or complaints at this time. Please see ROS for further details.      Echocardiogram Limited (12/22/16) Impression:  Left ventricular size is normal.  The Ejection Fraction is estimated at 35-40%.  Inferior wall akinesis is present.  Septal,basal to mid anterior wall akinesis.  Right ventricular function, chamber size, wall motion, and thickness are  normal.  Compared to prior study,the wallmotion abnormality is new.    I have reviewed the Medications, Allergies, Past Medical and Surgical History, and Social History in the [facility] - 3P system.  Past Medical History:   Diagnosis Date     Anemia      Antiplatelet or antithrombotic long-term use      Arthritis      AS (sickle cell trait) (H) 10/8/2013     Blind left eye      BPPV (benign paroxysmal positional vertigo)      Chronic back pain      COPD  (chronic obstructive pulmonary disease) (H)      Coronary artery disease      CVA (cerebral infarction) 10/8/2012    x3, residual left sided weakness     Diastolic CHF (H) 9/4/2012     Dilantin toxicity 5/31/2013     Esophageal candidiasis (H)      GERD (gastroesophageal reflux disease)      Heart attack      Hernia, abdominal      History of blood transfusion     x5     History of thrombophlebitis      HTN (hypertension) 9/4/2012     Hyperlipidemia LDL goal <100 9/4/2012     Legally blind in right eye, as defined in [place]     No periphal vision right eye     Osteoporosis 1/21/2013     Other chronic pain      PAD (peripheral artery disease) (H)      Palpitations      Person who has had sex change operation 1/20/2014     Pneumonia      Schizoaffective disorder (H)      Seizure disorder (H) 9/4/2012     Sleep apnea     CPAP     Thrombosis of leg      Type 2 diabetes, HbA1C goal < 8% (H) 9/4/2012     Uncomplicated asthma      Unspecified cerebral artery occlusion with cerebral infarction        Past Surgical History:   Procedure Laterality Date     AMPUTATE LEG ABOVE KNEE Left 6/11/2016    Procedure: AMPUTATE LEG ABOVE KNEE;  Surgeon: Mello Rodriguez MD;  [facility]     AMPUTATE LEG BELOW KNEE Right 11/7/2016    Procedure: AMPUTATE LEG BELOW KNEE;  Surgeon: Savannah Durant MD;  [facility]     AMPUTATE REVISION STUMP LOWER EXTREMITY Right 11/11/2016    Procedure: AMPUTATE REVISION STUMP LOWER EXTREMITY;  Surgeon: Savannah Durant MD;  [facility]     AMPUTATE REVISION STUMP LOWER EXTREMITY Right 11/16/2016    Procedure: AMPUTATE REVISION STUMP LOWER EXTREMITY;  Surgeon: Savannah Durant MD;  [facility]     AMPUTATE TOE(S) Right 1/5/2016    Procedure: AMPUTATE TOE(S);  Surgeon: Mello Gaines DPM;  Location:  [facility]     ANGIOGRAM Bilateral 11/21/2014    Procedure: ANGIOGRAM;  Surgeon: Savannah Durant MD;  [facility]     ANGIOGRAM Left 1/16/2015    Procedure: ANGIOGRAM;  Surgeon: Savannah Durant MD;   [facility]     ANGIOGRAM Bilateral 9/14/2015    Procedure: ANGIOGRAM;  Surgeon: Savannah Durant MD;  [facility]     ANGIOGRAM Left 10/12/2015    Procedure: ANGIOGRAM;  Surgeon: Savannah Durant MD;  [facility]     ANGIOGRAM Right 6/6/2016    Procedure: ANGIOGRAM;  Surgeon: Savannah Durant MD;  [facility]     ANGIOPLASTY Right 6/6/2016    Procedure: ANGIOPLASTY;  Surgeon: Savannah Durant MD;  [facility]     APPENDECTOMY       BREAST SURGERY      right breast bx (benign)     CHOLECYSTECTOMY       COLONOSCOPY N/A 8/25/2014    Procedure: COLONOSCOPY;  Surgeon: Mello Ferrer MD;  [facility]     COLONOSCOPY WITH CO2 INSUFFLATION N/A 8/20/2014    Procedure: COLONOSCOPY WITH CO2 INSUFFLATION;  Surgeon: Duane, William Charles, MD;  [facility]     ENDARTERECTOMY FEMORAL  5/23/2014    Procedure: ENDARTERECTOMY FEMORAL;  Surgeon: Jason Joshi MD;  [facility]     ESOPHAGOSCOPY, GASTROSCOPY, DUODENOSCOPY (EGD), COMBINED  12/14/2012    Procedure: COMBINED ESOPHAGOSCOPY, GASTROSCOPY, DUODENOSCOPY (EGD), BIOPSY SINGLE OR MULTIPLE;  ESOPHAGOSCOPY, GASTROSCOPY, DUODENOSCOPY (EGD), DILATATION ;  Surgeon: Elizabeth Stevenson MD;  Location:  GI     ESOPHAGOSCOPY, GASTROSCOPY, DUODENOSCOPY (EGD), COMBINED  12/31/2013    Procedure: COMBINED ESOPHAGOSCOPY, GASTROSCOPY, DUODENOSCOPY (EGD), BIOPSY SINGLE OR MULTIPLE;;  Surgeon: Clemente Lopez MD;  [facility]     ESOPHAGOSCOPY, GASTROSCOPY, DUODENOSCOPY (EGD), COMBINED  4/1/2014    Procedure: COMBINED ESOPHAGOSCOPY, GASTROSCOPY, DUODENOSCOPY (EGD);;  Surgeon: Clemente Lopez MD;  [facility]     ESOPHAGOSCOPY, GASTROSCOPY, DUODENOSCOPY (EGD), COMBINED  6/28/2014    Procedure: COMBINED ESOPHAGOSCOPY, GASTROSCOPY, DUODENOSCOPY (EGD);  Surgeon: Clemente Lopez MD;  [facility]     ESOPHAGOSCOPY, GASTROSCOPY, DUODENOSCOPY (EGD), COMBINED N/A 8/20/2014    Procedure: COMBINED ESOPHAGOSCOPY, GASTROSCOPY, DUODENOSCOPY (EGD), BIOPSY SINGLE OR  MULTIPLE;  Surgeon: Duane, William Charles, MD;  [facility]     ESOPHAGOSCOPY, GASTROSCOPY, DUODENOSCOPY (EGD), COMBINED N/A 8/22/2014    Procedure: COMBINED ESOPHAGOSCOPY, GASTROSCOPY, DUODENOSCOPY (EGD), BIOPSY SINGLE OR MULTIPLE;  Surgeon: Mello Ferrer MD;  [facility]     ESOPHAGOSCOPY, GASTROSCOPY, DUODENOSCOPY (EGD), COMBINED N/A 10/2/2014    Procedure: COMBINED ESOPHAGOSCOPY, GASTROSCOPY, DUODENOSCOPY (EGD), BIOPSY SINGLE OR MULTIPLE;  Surgeon: Remy Haskins MD;  [facility]     ESOPHAGOSCOPY, GASTROSCOPY, DUODENOSCOPY (EGD), COMBINED Left 12/15/2014    Procedure: COMBINED ESOPHAGOSCOPY, GASTROSCOPY, DUODENOSCOPY (EGD), BIOPSY SINGLE OR MULTIPLE;  Surgeon: Remy Haskins MD;  [facility]     ESOPHAGOSCOPY, GASTROSCOPY, DUODENOSCOPY (EGD), COMBINED N/A 2/25/2015    Procedure: COMBINED ENDOSCOPIC ULTRASOUND, ESOPHAGOSCOPY, GASTROSCOPY, DUODENOSCOPY (EGD), FINE NEEDLE ASPIRATE/BIOPSY;  Surgeon: Clemente Lugo MD;  [facility]     ESOPHAGOSCOPY, GASTROSCOPY, DUODENOSCOPY (EGD), COMBINED Left 2/25/2015    Procedure: COMBINED ESOPHAGOSCOPY, GASTROSCOPY, DUODENOSCOPY (EGD), BIOPSY SINGLE OR MULTIPLE;  Surgeon: Clemente Lugo MD;  [facility]     ESOPHAGOSCOPY, GASTROSCOPY, DUODENOSCOPY (EGD), COMBINED N/A 9/25/2016    Procedure: COMBINED ESOPHAGOSCOPY, GASTROSCOPY, DUODENOSCOPY (EGD);  Surgeon: Aziza Patiño MD;  [facility]     ESOPHAGOSCOPY, GASTROSCOPY, DUODENOSCOPY (EGD), COMBINED N/A 1/18/2017    Procedure: COMBINED ESOPHAGOSCOPY, GASTROSCOPY, DUODENOSCOPY (EGD), BIOPSY SINGLE OR MULTIPLE;  Surgeon: Clemente Lopez MD;  [facility]     ESOPHAGOSCOPY, GASTROSCOPY, DUODENOSCOPY (EGD), COMBINED N/A 11/26/2017    Procedure: COMBINED ESOPHAGOSCOPY, GASTROSCOPY, DUODENOSCOPY (EGD), REMOVE FOREIGN BODY;  Esophagogastroduodenoscopy with foreign body extraction  ;  Surgeon: Herberth Castrejon MD;  [facility]     ESOPHAGOSCOPY, GASTROSCOPY,  DUODENOSCOPY (EGD), COMBINED N/A 11/26/2017    Procedure: COMBINED ESOPHAGOSCOPY, GASTROSCOPY, DUODENOSCOPY (EGD), REMOVE FOREIGN BODY;;  Surgeon: Herberth Castrejon MD;  [facility]     FASCIOTOMY LOWER EXTREMITY Left 6/10/2016    Procedure: FASCIOTOMY LOWER EXTREMITY;  Surgeon: Mello Rodriguez MD;  [facility]     HC CAPSULE ENDOSCOPY N/A 8/25/2014    Procedure: CAPSULE/PILL CAM ENDOSCOPY;  Surgeon: Remy Haskins MD;  [facility]     HC CAPSULE ENDOSCOPY N/A 10/2/2014    Procedure: CAPSULE/PILL CAM ENDOSCOPY;  Surgeon: Remy Haskins MD;  [facility]     ORTHOPEDIC SURGERY      broken wrist repair     SEX TRANSFORMATION SURGERY, MALE TO FEMALE      1974     SINUS SURGERY      cyst removed     TONSILLECTOMY       VASCULAR SURGERY      Left carotid stent       Family History   Problem Relation Age of Onset     Dementia Mother      Glaucoma Mother      DIABETES Mother      may have been type 1, childhood     Coronary Artery Disease Mother      MI     Glaucoma Father      DIABETES Father      may hev been type 1 - ??     Heart Failure Father      CANCER Maternal Aunt      leukemia     Schizophrenia Brother      Depression Brother      Suicide Sister      Depression Sister      DIABETES Sister      CANCER Maternal Aunt      ovarian     Glaucoma Maternal Grandmother      DIABETES Maternal Grandmother      Glaucoma Maternal Grandfather      DIABETES Maternal Grandfather      Glaucoma Paternal Grandmother      DIABETES Paternal Grandmother      Glaucoma Paternal Grandfather      DIABETES Paternal Grandfather      Breast Cancer Sister      CEREBROVASCULAR DISEASE Brother      Colon Cancer No family hx of        Social History   Substance Use Topics     Smoking status: Current Every Day Smoker     Packs/day: 2.50     Years: 30.00     Types: Cigarettes, Cigars     Last attempt to quit: 11/1/2000     Smokeless tobacco: Never Used      Comment: Smoking 3 cig per day with each meal.       Alcohol use No       Current Facility-Administered Medications   Medication     0.9% sodium chloride infusion     Current Outpatient Prescriptions   Medication     pantoprazole (PROTONIX) 40 MG EC tablet     metFORMIN (GLUCOPHAGE-XR) 500 MG 24 hr tablet     brimonidine (ALPHAGAN) 0.2 % ophthalmic solution     dorzolamide-timolol (COSOPT) 2-0.5 % ophthalmic solution     sucralfate (CARAFATE) 1 GM tablet     escitalopram (LEXAPRO) 5 MG tablet     albuterol (PROAIR HFA/PROVENTIL HFA/VENTOLIN HFA) 108 (90 BASE) MCG/ACT Inhaler     umeclidinium (INCRUSE ELLIPTA) 62.5 MCG/INH oral inhaler     ONDANSETRON PO     cyclobenzaprine (FLEXERIL) 10 MG tablet     lidocaine (LIDODERM) 5 % Patch     blood glucose monitoring (ONE TOUCH ULTRASOFT) lancets     blood glucose monitoring (ONE TOUCH ULTRA) test strip     metoprolol (TOPROL-XL) 25 MG 24 hr tablet     traZODone (DESYREL) 100 MG tablet     order for DME     order for DME     order for DME     polyethylene glycol (MIRALAX/GLYCOLAX) Packet     ACETAMINOPHEN PO     pregabalin (LYRICA) 75 MG capsule     cetirizine (ZYRTEC) 10 MG tablet     diclofenac (VOLTAREN) 1 % GEL topical gel     EPINEPHrine (EPIPEN JR) 0.15 MG/0.3ML injection     lisinopril (PRINIVIL/ZESTRIL) 10 MG tablet     risperiDONE (RISPERDAL) 0.5 MG tablet     ferrous sulfate (IRON) 325 (65 FE) MG tablet     order for DME     order for DME     order for DME     albuterol (2.5 MG/3ML) 0.083% neb solution     CYANOCOBALAMIN PO     Nutritional Supplements (RENÉE) PACK     fluticasone (FLONASE) 50 MCG/ACT nasal spray     ADVAIR DISKUS 250-50 MCG/DOSE diskus inhaler     calcium citrate-vitamin D (CITRACAL) 315-250 MG-UNIT TABS     hydrochlorothiazide (MICROZIDE) 12.5 MG capsule     estradiol (ESTRACE) 1 MG tablet     levETIRAcetam (KEPPRA) 500 MG tablet     aspirin EC 81 MG EC tablet     blood glucose monitoring (ONE TOUCH ULTRA 2) meter device kit     phenytoin 200 MG CAPS     dorzolamide (TRUSOPT) 2 % ophthalmic  solution     zinc 50 MG TABS     nitroglycerin (NITROSTAT) 0.4 MG SL tablet     MEDICATION GIVEN BY INTRATHECAL PUMP - INSTRUCTION     latanoprost (XALATAN) 0.005 % ophthalmic solution     atorvastatin (LIPITOR) 40 MG tablet     order for DME     prochlorperazine (COMPAZINE) 10 MG tablet     ORDER FOR DME     ORDER FOR DME     Cholecalciferol (VITAMIN D) 2000 UNITS tablet     Multiple Vitamin (MULTIVITAMIN OR)     Facility-Administered Medications Ordered in Other Encounters   Medication     neostigmine (PROSTIGMINE) injection     glycopyrrolate (ROBINUL) injection        Allergies   Allergen Reactions     Bee Venom      Penicillins Anaphylaxis     Other reaction(s): Skin Rash and/or Hives     Dilantin [Phenytoin] Other (See Comments)     Generic dilantin only per pt     Iodide Hives     09/11/15: Pt states she can use iodine and doesn't have any problems      Iodine-131      Novocaine [Procaine] Hives     Other reaction(s): Skin Rash and/or Hives     Tositumomab        Review of Systems   Constitutional: Positive for appetite change (decreased), chills, diaphoresis and fatigue. Negative for fever.   HENT: Negative for ear pain, sore throat, tinnitus, trouble swallowing and voice change.    Eyes: Negative for pain and visual disturbance.   Respiratory: Negative for cough and shortness of breath.    Cardiovascular: Negative for chest pain, palpitations and leg swelling.   Gastrointestinal: Positive for abdominal pain, nausea and vomiting. Negative for blood in stool, constipation and diarrhea.   Endocrine: Negative for polydipsia and polyuria.   Genitourinary: Positive for dysuria (since last UTI, still present but improved). Negative for frequency, hematuria and urgency.   Musculoskeletal: Negative for back pain and neck pain.   Skin: Negative for color change and rash.   Allergic/Immunologic: Positive for environmental allergies (bee venom). Negative for immunocompromised state (DM).        Medication allergies    Neurological: Positive for headaches (sharp, frontal). Negative for dizziness, weakness, light-headedness and numbness.   Hematological: Negative for adenopathy. Does not bruise/bleed easily.   Psychiatric/Behavioral: Negative for dysphoric mood. The patient is not nervous/anxious.        Physical Exam   BP: 154/84  Heart Rate: 68  Temp: 97.8  F (36.6  C)  Resp: 12  SpO2: 96 %      Physical Exam  CONSTITUTIONAL: Well-developed and well-nourished. Awake and alert. Non-toxic appearance. No acute distress.   HENT:   - Head: Normocephalic and atraumatic.   - Ears: Hearing and external ear grossly normal.   - Nose: Nose normal. No rhinorrhea. No epistaxis.   - Mouth/Throat: Lips/oropharynx somewhat dry.   EYES: Conjunctivae and lids are normal. No scleral icterus.   NECK: Normal range of motion and phonation normal. Neck supple.  No tracheal deviation, no stridor. No edema or erythema noted.  CARDIOVASCULAR: Normal rate, regular rhythm and no appreciable abnormal heart sounds.  PULMONARY/CHEST: Effort normal. No accessory muscle usage or stridor. No respiratory distress.  No appreciable abnormal breath sounds.  ABDOMEN: Soft, non-distended. Some lower abdominal pain, less so in the mid/upper abdomen. No rigidity, rebound or guarding. No palpable masses, no peritoneal findings, no pulsatility.  MUSCULOSKELETAL: Extremities warm and seemingly well perfused. Bilateral LE amputations.   NEUROLOGIC: Awake, alert. Not disoriented. Normal tone. No seizure activity. Coordination normal. GCS 15  SKIN: Skin is warm and dry. No rash noted. No diaphoresis. No pallor.   PSYCHIATRIC: Normal mood and affect. Speech and behavior normal. Thought processes linear. Cognition and memory are normal.     ED Course     ED Course   Value Comment Time   Lactic Acid: (!) 0.5 (Reviewed) 12/05 2110   Troponin I ES: <0.015 (Reviewed) 12/05 2110   Lipase: 130 (Reviewed) 12/05 2110   WBC: (!) 11.2 Mildly up from previous 12/05 2110   Potassium: (!)  3.3 Mildly down from previous 12/05 2110   Nitrite Urine: (!) Positive (Reviewed) 12/06 0040   Leukocyte Esterase Urine: Negative (Reviewed) 12/06 0040   RBC Urine: 2 (Reviewed) 12/06 0040   Bacteria Urine: (!) Many (Reviewed) 12/06 0040   Squamous Epithelial /HPF Urine: (!) 3 (Reviewed) 12/06 0040     Procedures       8:02 PM  The patient was seen and examined by Dr. Dick in Room [facility].          EKG Interpretation:      Interpreted by Cary Dick MD  Time reviewed: 19:34  Symptoms at time of EKG: nausea and vomiting   Rhythm: sinus bradycardia  Rate: 57 bpm  Axis: Normal  Ectopy: none  Conduction: normal  ST Segments/ T Waves: No ST-T wave changes  Comparison to prior on 10/08/17: HR now 57 from 95 bpm; otherwise no significant changes  Clinical Impression: sinus bradycardia, otherwise morphology unchanged from previous         Labs Ordered and Resulted from Time of ED Arrival Up to the Time of Departure from the ED   CBC WITH PLATELETS DIFFERENTIAL - Abnormal; Notable for the following:        Result Value    WBC 11.2 (*)     RDW 15.4 (*)     Absolute Neutrophil 8.5 (*)     All other components within normal limits   COMPREHENSIVE METABOLIC PANEL - Abnormal; Notable for the following:     Potassium 3.3 (*)     Creatinine 0.46 (*)     Bilirubin Total 0.1 (*)     Albumin 3.1 (*)     Alkaline Phosphatase 246 (*)     All other components within normal limits   ISTAT  GASES LACTATE TIKA POCT - Abnormal; Notable for the following:     PCO2 Venous 52 (*)     PO2 Venous 58 (*)     Bicarbonate Venous 31 (*)     Lactic Acid 0.5 (*)     All other components within normal limits   INR   LIPASE   TROPONIN I   ROUTINE UA WITH MICROSCOPIC REFLEX TO CULTURE   ISTAT CG4 GASES LACTATE TIKA NURSING POCT            Assessments & Plan (with Medical Decision Making)   IMPRESSION: 67-year-old, medically complex female with a PMH notable for COPD, ICM, CHF with previous MI, DM 2, esophagitis with previous esophageal strictures (last  seen in our ED after a food bolus that needed procedural management/EGD w/ GI), nausea or vomiting, previous strokes, schizoaffective disorder, previous UTIs, et al., who presents today w/ a 1 week hx of nausea, vomting today, some dysuria, intermittent chills, and generalized malaise as described further above in HPI/ROS.  Clinically, patient appears nontoxic, NAD, but does appear clinically dehydrated. Noo acute cardiopulmonary findings.  Does have some lower abdominal discomfort, less so but present to some degree in the upper/mid abdomen, no pulsatility, no peritoneal findings.    DDX includes but not limited to UTI, generalized abdominal discomfort or recurrent nausea/vomiting, gastroparesis in the setting of diabetes, less likely colitis or enteritis, et al. she has not had a significant amount of vomiting, no hematemesis, no chest pain or breathing symptoms to make me think she would have Shirley-Remy or Boerhaave syndrome.  Does not sound as consistent with cardiac, but we'll get a baseline screening ECG and troponin.    PLAN: ECG, Labs, urine studies, abdominal imaging, symptom management as needed     RESULTS:  - Labs: Normal lactate, negative troponin, WBC 11.2 (slightly up from previous) to), K3.3 (slightly down from previous), lipase normal  --- See ED Course section above for particular pertinent findings and comments  - Urine: Not clear UTI (improved from recent), urine culture pending  - Imaging: Images and written preliminary reports reviewed by myself and revealed:  --- CT A/P: No acute findings to explain symptoms reported in prelim  --- Head CT: No acute findings on prelim report    INTERVENTIONS:   - IVF  - PO Tylenol  - IV Reglan  - ODT Zofran  - IV KCl    RE-EVALUATION:  - Pt still symptomatic, perhaps slightly improved  - Pt's IV was inadvertently pulled out. Left out w/o replacing initially to try PO challenge. Tried a little water and some crackers, which she then threw up, still  symptomatic. Will get new IV and admit to ED Obs for hydration, observation.     DISCUSSIONS:  - I discussed the care of the patient with EM Provider covering ED OBs, who is also the overnight EM MD. They will assume care of the patient.     DISPOSITION/PLANNING:  - IMPRESSION:  Nausea, vomiting (lower>upper abdominal discomfort) w/o sx's for food bolus in medically comorbid 66 yo F  --- Pending: Urine culture (UA improved from previous, unclear if new infection), Mg level  - DISPOSITION: Admit to ED Obs for further evaluation/management  --- IV hydration, management of nausea vomiting  --- Serial exams for abdominal pain  --- Recheck electrolytes/labs in AM  --- Advance to soft Mechanical diet as tolerated (on soft diet w/ recent esophageal issues)        ______________________________________________________________________________    - I have reviewed the available nursing notes.      New Prescriptions    No medications on file       Final diagnoses:   None   Angie MONREAL, am serving as a trained medical scribe to document services personally performed by Cary Dikc MD, based on the provider's statements to me.   Cary MONREAL MD, was physically present and have reviewed and verified the accuracy of this note documented by Angie Martinez.      12/5/2017   [facility], [place], EMERGENCY DEPARTMENT     Cary Dick MD  12/06/17 [id number]

## 2017-12-06 NOTE — PLAN OF CARE
Problem: Patient Care Overview  Goal: Plan of Care/Patient Progress Review  Outcome: Improving  Outpatient/Observation goals to be met before discharge home:      -Diagnostic tests and consults completed and resulted: NO   -Vital signs normal or at patient baseline: YES   -Tolerating oral intake to maintain hydration: NO, reports nausea and abdominal pain.    Pt reports feeling overall better, looking forward to discharging if she can tolerating oral intake. Awaiting lunch tray.

## 2017-12-06 NOTE — PROGRESS NOTES
Care Coordinator Progress Note     Admission Date/Time:  12/5/2017  Attending MD:  [place] att. providers found     Data  Chart reviewed, discussed with interdisciplinary team.   Patient was admitted for: Nausea and vomiting, intractability of vomiting not specified, unspecified vomiting type.    Concerns with insurance coverage for discharge needs: None.  Current Living Situation: Patient lives in an assisted living facility.  Support System: Supportive and Involved  Services Involved: Assisted Living  Transportation: [place] transportation,  [employer] Provide a Ride [phone]  Barriers to Discharge: chronically ill, dependent with mobility/activities of daily living and physical impairment    Coordination of Care and Referrals: Provided patient/family with options for discussion of current living situation.       Assessment   At pt bedside to discuss RNCC role and pt current living situation. [facility] [place] [facility] and pt states she is satisfied with her living arrangements. Pt also states she has RN home care services once per week through . Sonya states she is mobile via an electric wheelchair, which is at her assisted living facility. She states she will be able to use a standard wheelchair for transport back home via medical transport van. Pt states she calls [employer] transportation to get to and from medical appointments. Will need transportation once DC'ed.     [facility]  (p) [phone]  (f)  [phone]    Plan  Anticipated Discharge Date:  Pending  Anticipated Discharge Plan:  DC with provider recommendations.     Kiley Alarcon RN CC  [place] ED/6D Obs  [phone]

## 2017-12-06 NOTE — PLAN OF CARE
Problem: Patient Care Overview  Goal: Plan of Care/Patient Progress Review  -Diagnostic tests and consults completed and resulted: No   -Vital signs normal or at patient baseline: Pending   -Tolerating oral intake to maintain hydration: No, patient reports nausea

## 2017-12-06 NOTE — DISCHARGE SUMMARY
Discharge Summary    Sonya Foote MRN# [id number]   YOB: 1950 Age: 67 year old     Date of Admission:  12/5/2017  Date of Discharge:  12/6/2017  Admitting Physician:  Hannah Horan MD  Discharge Physician:  No att. providers found  Discharging Service:  ED observation     Primary Provider: Allan Casey          Discharge Diagnosis:     Nausea & vomiting    * No resolved hospital problems. *               Discharge Disposition:   Discharged to assisted living           Condition on Discharge:   Discharge condition: Stable   Code status on discharge: Full Code           Procedures:   None          Discharge Medications:     Current Discharge Medication List      START taking these medications    Details   levofloxacin (LEVAQUIN) 500 MG tablet Take 1 tablet (500 mg) by mouth daily  Qty: 7 tablet, Refills: 0    Associated Diagnoses: Urinary tract infection without hematuria, site unspecified         CONTINUE these medications which have NOT CHANGED    Details   pantoprazole (PROTONIX) 40 MG EC tablet Take 1 tablet (40 mg) by mouth 2 times daily  Qty: 30 tablet, Refills: 1    Associated Diagnoses: Gastroesophageal reflux disease without esophagitis      metFORMIN (GLUCOPHAGE-XR) 500 MG 24 hr tablet Take 1 tablet (500 mg) by mouth daily (with dinner)  Qty: 90 tablet, Refills: 3    Associated Diagnoses: Type 2 diabetes mellitus with diabetic peripheral angiopathy and gangrene, without long-term current use of insulin (H)      brimonidine (ALPHAGAN) 0.2 % ophthalmic solution Place 1 drop into the right eye 3 times daily  Qty: 15 mL, Refills: 11    Associated Diagnoses: Primary open angle glaucoma of both eyes, severe stage      dorzolamide-timolol (COSOPT) 2-0.5 % ophthalmic solution Place 1 drop into the right eye 2 times daily  Qty: 1 Bottle, Refills: 11    Associated Diagnoses: Primary open angle glaucoma of both eyes, severe stage      sucralfate (CARAFATE) 1 GM tablet Take 1 tablet (1 g) by mouth  4 times daily May dissolve in 10 mL water is necessary. (Start upon completion of carafate suspension)  Qty: 120 tablet, Refills: 11    Associated Diagnoses: Adjustment disorder with depressed mood      escitalopram (LEXAPRO) 5 MG tablet Take 2 tablets (10 mg) by mouth daily  Qty: 60 tablet, Refills: 5    Associated Diagnoses: Adjustment disorder with depressed mood      albuterol (PROAIR HFA/PROVENTIL HFA/VENTOLIN HFA) 108 (90 BASE) MCG/ACT Inhaler Inhale 2 puffs into the lungs every 6 hours as needed for shortness of breath / dyspnea or wheezing  Qty: 3 Inhaler, Refills: 1    Associated Diagnoses: JOSE (obstructive sleep apnea)      umeclidinium (INCRUSE ELLIPTA) 62.5 MCG/INH oral inhaler Inhale 1 puff into the lungs daily      ONDANSETRON PO Take 4 mg by mouth 3 times daily      cyclobenzaprine (FLEXERIL) 10 MG tablet Take 1 tablet (10 mg) by mouth 2 times daily as needed for muscle spasms  Qty: 30 tablet, Refills: 1    Associated Diagnoses: Cervicalgia      lidocaine (LIDODERM) 5 % Patch APPLY 1 TO 3 PATCHES TO PAINFUL AREA AT ONCE FOR UP TO 12 HOURS WITHIN A 24 HOUR PERIOD REMOVE AFTER 12 HOURS  Qty: 30 patch, Refills: 5    Associated Diagnoses: Chronic pain syndrome; 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 (H)      metoprolol (TOPROL-XL) 25 MG 24 hr tablet TAKE 1 TABLET (25 MG) BY MOUTH DAILY  Qty: 30 tablet, Refills: 10    Associated Diagnoses: Old myocardial infarction      traZODone (DESYREL) 100 MG tablet Take 1.5 tablets (150 mg) by mouth At Bedtime  Qty: 90 tablet, Refills: 3    Associated Diagnoses: Anxiety state      polyethylene glycol (MIRALAX/GLYCOLAX) Packet Take 17 g by mouth daily as needed Dissolved in water or juice       ACETAMINOPHEN PO Take 1,000 mg by mouth every 6 hours as needed for fever Taking q4-6 hours, per MAR      pregabalin (LYRICA) 75 MG capsule Take 2 capsules (150 mg) by mouth 2 times daily  Qty: 120 capsule, Refills: 5    Associated Diagnoses: Pain in both  upper extremities      cetirizine (ZYRTEC) 10 MG tablet Take 1 tablet (10 mg) by mouth daily as needed for allergies  Qty: 90 tablet, Refills: 3    Associated Diagnoses: Seasonal allergic rhinitis, unspecified allergic rhinitis trigger      diclofenac (VOLTAREN) 1 % GEL topical gel Apply 2 g topically 4 times daily to hands  Qty: 100 g, Refills: 11    Associated Diagnoses: Primary osteoarthritis of both hands      lisinopril (PRINIVIL/ZESTRIL) 10 MG tablet Take 1 tablet (10 mg) by mouth daily  Qty: 90 tablet, Refills: 3    Associated Diagnoses: Essential hypertension with goal blood pressure less than 130/80      risperiDONE (RISPERDAL) 0.5 MG tablet Take 0.5 mg by mouth At Bedtime      ferrous sulfate (IRON) 325 (65 FE) MG tablet Take 1 tablet (325 mg) by mouth 2 times daily With meals  Qty: 60 tablet, Refills: 2      albuterol (2.5 MG/3ML) 0.083% neb solution INHALE 1 VIAL VIA NEBULIZER EVERY 6 HOURS AS NEEDED  Qty: 360 mL, Refills: 11    Associated Diagnoses: Chronic obstructive pulmonary disease, unspecified COPD type (H)      CYANOCOBALAMIN PO Take 2,000 mcg by mouth daily      Nutritional Supplements (RENÉE) PACK Take 1 packet by mouth 2 times daily      fluticasone (FLONASE) 50 MCG/ACT nasal spray Spray 1 spray into both nostrils daily      ADVAIR DISKUS 250-50 MCG/DOSE diskus inhaler Inhale 1 puff into the lungs 2 times daily       calcium citrate-vitamin D (CITRACAL) 315-250 MG-UNIT TABS Take 2 tablets by mouth daily  Qty: 120 tablet, Refills: 5    Associated Diagnoses: Osteoporosis      hydrochlorothiazide (MICROZIDE) 12.5 MG capsule Take 1 capsule (12.5 mg) by mouth daily  Qty: 90 capsule, Refills: 3    Associated Diagnoses: Essential hypertension with goal blood pressure less than 130/80      estradiol (ESTRACE) 1 MG tablet Take 1 tablet (1 mg) by mouth daily  Qty: 90 tablet, Refills: 3    Associated Diagnoses: Person who has had sex change operation      levETIRAcetam (KEPPRA) 500 MG tablet Take 1  tablet (500 mg) by mouth 2 times daily  Qty: 180 tablet, Refills: 1    Associated Diagnoses: Nausea      aspirin EC 81 MG EC tablet Take 1 tablet (81 mg) by mouth daily  Qty: 90 tablet, Refills: 3    Associated Diagnoses: Unstable angina (H)      phenytoin 200 MG CAPS Take 200 mg by mouth 2 times daily  Qty: 60 capsule, Refills: 0    Associated Diagnoses: Seizure disorder (H)      dorzolamide (TRUSOPT) 2 % ophthalmic solution Place 1 drop into both eyes 2 times daily       zinc 50 MG TABS Take 1 tablet by mouth daily      latanoprost (XALATAN) 0.005 % ophthalmic solution Place 1 drop into both eyes At Bedtime  Qty: 2.5 mL, Refills: 11    Comments: Plz remind pt to f/u as scheduled, 3/25/16. Thanks!  Associated Diagnoses: Primary open angle glaucoma, stage unspecified      atorvastatin (LIPITOR) 40 MG tablet Take 1 tablet (40 mg) by mouth daily  Qty: 90 tablet, Refills: 3    Associated Diagnoses: Hyperlipidemia LDL goal <100      prochlorperazine (COMPAZINE) 10 MG tablet Take 1 tablet (10 mg) by mouth every 8 hours as needed  Qty: 20 tablet, Refills: 0    Associated Diagnoses: Nausea with vomiting      Cholecalciferol (VITAMIN D) 2000 UNITS tablet Take 2,000 Units by mouth daily.  Qty: 100 tablet, Refills: 3      Multiple Vitamin (MULTIVITAMIN OR) Take 1 tablet by mouth daily       blood glucose monitoring (ONE TOUCH ULTRASOFT) lancets Use to test blood sugar 3 times daily or as directed.  Qty: 100 each, Refills: PRN    Associated Diagnoses: Type 2 diabetes mellitus with diabetic peripheral angiopathy without gangrene, without long-term current use of insulin (H)      blood glucose monitoring (ONE TOUCH ULTRA) test strip Use to test blood sugars 3 times daily or as directed.  Qty: 3 Box, Refills: 3    Associated Diagnoses: Type 2 diabetes mellitus with diabetic peripheral angiopathy without gangrene, without long-term current use of insulin (H)      !! order for DME Full electric hospital bed with half rails    Dx:  C15206, I110, J449  Length of need: lifetime  Qty: 1 Device, Refills: 0    Associated Diagnoses: Status post bilateral above knee amputation (H)      !! order for DME Wheel Chair Cushion: 18 x 18 inch Roho cushion  Qty: 1 Device, Refills: 0    Associated Diagnoses: Status post bilateral above knee amputation (H)      !! order for DME Hospital bed with side rails  Qty: 1 Device, Refills: 0    Associated Diagnoses: 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 (H)      EPINEPHrine (EPIPEN JR) 0.15 MG/0.3ML injection Inject 0.3 mLs (0.15 mg) into the muscle as needed for anaphylaxis  Qty: 0.6 mL, Refills: 1    Associated Diagnoses: Bee sting reaction, undetermined intent, subsequent encounter      !! order for DME Equipment being ordered: CPAP supplies mask, hose, filters, cushion    fax to [facility] at [phone]  Qty: 10 Device, Refills: prn    Associated Diagnoses: COPD (chronic obstructive pulmonary disease) (H)      !! order for DME Equipment being ordered: CPAP supplies mask, hose, filters, cushion fax to [facility] at [phone]  Disp: 10 Device Refills: prn   Class: Local Print Start: 2/10/2017  Qty: 1 Device, Refills: 0    Associated Diagnoses: Chronic obstructive pulmonary disease, unspecified COPD type (H)      !! order for DME Equipment being ordered: Nebulizer and tubing supplies  Qty: 1 Units, Refills: 0    Associated Diagnoses: Simple chronic bronchitis (H)      blood glucose monitoring (ONE TOUCH ULTRA 2) meter device kit Use to test blood sugars 3 times daily or as directed.  Qty: 1 kit, Refills: 0    Associated Diagnoses: Type 2 diabetes, HbA1C goal < 8% (H)      nitroglycerin (NITROSTAT) 0.4 MG SL tablet Place 1 tablet (0.4 mg) under the tongue every 5 minutes as needed for chest pain if you are still having symptoms after 3 doses (15 minutes) call 911.  Qty: 25 tablet, Refills: 1    Associated Diagnoses: Chronic systolic congestive heart failure (H); Old myocardial  infarction      MEDICATION GIVEN BY INTRATHECAL PUMP - INSTRUCTION continuous May 19, 2017 - per [facility] [place] [phone]:  Conc: Bupivacaine 20 mg/mL and Fentanyl 2000 mcg/mL.  Continuous: Bupivacaine 7.265 mg/day and Fentanyl 726.5 mcg/day.  Boluses: Up to 7 boluses per 24-hr period of Bupivicaine 0.599 mg and Fentanyl 59.9 mcg    Pump Refill Date at [facility]: 7/2/17      !! order for DME Equipment being ordered: Glucerna daily shakes with each meal  Qty: 1 Box, Refills: 11    Associated Diagnoses: Type 2 diabetes mellitus with other diabetic neurological complication      !! ORDER FOR DME Equipment being ordered: Power Wheelchair  Qty: 1 Device, Refills: 0    Associated Diagnoses: CVA (cerebral infarction); HTN (hypertension)      !! ORDER FOR DME Equipment being ordered: Depends briefs  Qty: 1 Month, Refills: 11    Associated Diagnoses: Incontinence       !! - Potential duplicate medications found. Please discuss with provider.                Consultations:   None             Brief History of Illness:   Sonya Foote is a 67 year old transgender female with a history of schizoaffective disorder, gender change in 1974, DM2, diastolic CHF, asthma, COPD, seizure disorder, hypertension, MI, CVA (x3), bilateral BKA, dysphagia due to esophageal stricture as well as multiple other medical issues who presents via ambulance from her nursing facility for evaluation of nausea and vomiting in the setting of likely UTI. She was admitted to ED obs for IV rehydration and antiemetics.             Hospital Course:     1. UTI. UA in ED with + nitrite, 4 WBCs, many bacteria. She admits to dysuria and urinary frequency. Symptoms and lab findings consistent with UTI without complication at this time. No signs of sepsis as blood pressures normal, no tachycardia, afebrile, normal lactate.  She does have a history of recurrent UTIs- followed by urology as outpatient and long term plan is for  suprapubic cather once able to come off anticoagulation. CT negative for stones or hydronephrosis. She was last admitted 10/8/2017 for UTI secondary to Klebiella pneumoniae UTI resulting in sepsis. At that time, she was discharged on a 5-day course of Levaquin.  Today, patient received 1 dose of Levaquin IV in the ED. She started to feel much better later in the day. She received continued IV fluids and tolerated oral soft diet intake. Transition to PO Levaquin for discharge.   Urine culture pending- follow up on results with PCP.       2. Nausea, vomiting, abdominal pain. LLQ abdominal pain, with diffusely tender abdomen, no focal tenderness. CT in ED was unremarkable for acute pathology. CMP did show elevated Alk phos at 246, however this was consistent with prior values since 08/14/2017. No evidence for acute biliary obstruction as remainder of labs and imaging normal. No findings consistent with diverticulitis or colitis on CT. Troponin x 1 negative and EKG showed no ischemic changes- less likely cardiac etiology. May be due to constipation as she states she has not had a BM in 3-4 days. Also may be due to UTI as above.   Mildly dry mucous membranes but no signs of severe clinical dehydration as kidney function normal and stable, no tachycardia. One episode of emesis in AM after admission to obs unit.  She received sennakot and did not have a BM. Recommend she take her Miralax daily at home until having soft BMs.   She received Zofran for antiemetic and started to feel much better later in the day- no further emesis while admitted. She reported an improvement in her abdominal pain and upon repeat examination, abdomen was no longer tender to palpation at all.  She received continued IV fluids and tolerated oral soft diet intake.   She was discharged in improving condition with plan for continued antiemetics PRN at home, for which she already has a prescription.     3. Hypokalemia. 3.3 in ED. Received IV  replacement in ED. Magnesium normal. Upon repeat CMP, K 3.6. No further emesis.      4. Sinus bradycardia. In ED, HR 53-68. EKG showed sinus bradycardia. Persistently bradycardic in low 50s on floor. Symptoms are not characteristic of symptomatic bradycardia. Held metoprolol dose in AM. HR normalized to low 60s. She has been asymptomatic throughout. Return to home dose metoprolol upon discharge.     Chronic Medical Issues:  #Dysphagia 2/2 esophageal strictures s/p previous dilatations. #Chronic erosive GERD  Admitted on 11/26/2017 following food bolus impaction which required upper GI removal. Was started on a PPI and discharged on mechanical soft diet. On observation, diet was advanced as tolerated and she was able to tolerate PO intake of soft diet as prior to admission. Continue Protonix as PTA, Follow up with GI as scheduled.     #Allergic rhinitis  #COPD. Patient complains of chronic shortness of breath, but it is not worse than her baseline. Does admit to cough and yellow sputum production, rhinorrhea. However, lungs were clear, she was moving air well, O2 sats excellent at 96%. Do not think her presentation is consistent with COPD exacerbation, with chief complaint being nausea/vomiting.  Did not bring home inhalers, thus we gave Duonebs q4h scheduled and Pulmicort BID scheduled, with albuterol nebs q4h prn. Upon discharge, she should resume home inhalers.     #CAD with recent NSTEMI 9/2016 s/p stenting, completed 1 year of plavix. #HFpEF. #HTN  Stress test 10/2017 showing inferior wall MI without significant ischemia, Troponin x 1 negative in ED. EKG without ischemic changes. Does not complain of chest pain, shortness of breath worse than her baseline.  Continued PTA ASA, lisinopril, HCTZ. Held metoprolol in setting of bradycardia (see above). Bradycardia resolved. No ectopic events of continuous telemetry.      #PAD s/p bilateral lower extremity amputations. Multiple previous surgeries. Bilateral femoral  stenting in the past. Continued  ASA.        #DM2. Blood sugar 92 in ED, no signs of DKA. Last A1C was 5.1 on 11/20/2017. Euglycemic here and tolerating oral intake. Continued PTA metformin        #Transgender. Continued PTA estrogen HRT      #Chronic pain. Followed in pain clinic has pain pump which delivers continuous fentanyl as well as a max of 7 bumps daily.  Patient did bring her button to give herself bumps. Pain was well-controlled using this and lidocaine patches for back pain. We continued PTA Lyrica.      #Restless leg syndrome. Continued PTA Lyrica      #Depression. #Anxiety. #Schizoaffective disorder. Continued PTA escitalopram.     #Insomnia. Continued PTA Trazodone.     #Seizure disorder. Continued PTA Keppra and phenytoin. Seizure precautions while hospitalized.                  Final Day of Progress before Discharge:       Physical Exam:  Blood pressure 132/68, temperature 98.4  F (36.9  C), temperature source Oral, resp. rate 17, weight 62.1 kg (137 lb), SpO2 98 %, not currently breastfeeding.       General: alert, appears comfortable and happy, but in NAD. Afebrile.  Skin: no suspicious lesions or rashes   Head: Normocephalic.  EENT: Sclera anicteric. Cataracts bilaterally. Glacomatous changes bilaterally. Right pupil fixed constriction. Oropharynx: tacky MMs.   Neck: Neck supple. No lymphadenopathy. Carotids without bruits.  Respiratory: No distress. Equal inspiration to bilateral bases. Lungs CTAB- no crackles, wheezes, or rales.  Cardiovascular: Mildly bradycardic, but normal S1/S2. No murmurs, clicks gallops or rub.    Gastrointestinal: Abdomen non-distended, non-tender. BS normal.   Musculoskeletal: bilateral BKA.  Neurologic: Alert. Follows commands. No dysarthria. EOMI. Facial movements symmetric. Sensation to light touch in CN V nerve root distributions.  strength 5/5 right, 4/5 left.   Psychiatric: mentation appears normal.         Data:  All laboratory data reviewed              Significant Results:     Results for orders placed or performed during the hospital encounter of 12/05/17   CT Head w/o Contrast    Narrative    CT HEAD W/O CONTRAST 12/5/2017 10:57 PM    History: headache;     Comparison: CT 4/12/2017.    Technique: Using multidetector thin collimation helical acquisition  technique, axial, coronal and sagittal CT images from the skull base  to the vertex were obtained without intravenous contrast.     Findings:    There is no evidence of intracranial hemorrhage, mass effect, midline  shift, or abnormal extraaxial fluid collection. Unchanged age-related  calcifications of basal ganglia. The ventricles and sulci are within  normal limits for age. Mild patchy periventricular white matter  hypodensities most consistent with chronic small vessel ischemic  disease. Gray-white differentiation is intact throughout both cerebral  hemispheres.  The bony calvaria and the bones of the skull base appear  normal.  The visualized mastoid air cells and paranasal sinuses are  clear.       Impression    Impression:   1. No acute intracranial pathology  2. Age-related bilateral basal ganglia calcifications are unchanged.    I have personally reviewed the examination and initial interpretation  and I agree with the findings.    TESSIE LOPEZ MD   CT Abdomen Pelvis w/o Contrast    Narrative    Exam: CT abdomen pelvis without contrast 12/5/2017 10:56 PM.    History: Abdominal pain, nausea vomiting.  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    Technique: CT images from the lung bases through the symphysis pubis  without contrast    Findings:   Cholecystectomy.  Mild reservoir effect of the common bile duct. Trace  pneumobilia, also seen on prior exam. This is likely postprocedural.  Otherwise the liver, pancreas, spleen, kidneys, and adrenal glands are  unremarkable. No abnormally dilated loops of small bowel or colon. No  free air or free fluid. No abdominal or pelvic lymphadenopathy. Small  hiatal hernia. Right inguinal postsurgical changes. Moderate aorto  iliac calcification. Bilateral stents in the common iliac arteries and  external iliac arteries. Patency of the vasculature cannot be  evaluated without contrast. Right buttocks neurostimulator device with  catheters in the spinal canal, tip not visualized.    The visualized lung bases are clear. No suspicious bony lesions.  Degenerative findings of the spine. Unchanged healing left-sided rib  fracture.      Impression    Impression:   1. New acute findings in the abdomen or pelvis.  2. Stable post procedural pneumobilia.    I have personally reviewed the examination and initial interpretation  and I agree with the findings.    JM DAMIAN MD   CBC with platelets differential   Result Value Ref Range    WBC 11.2 (H) 4.0 - 11.0 10e9/L    RBC Count 4.70 3.8 - 5.2 10e12/L    Hemoglobin 13.0 11.7 - 15.7 g/dL    Hematocrit 39.7 35.0 - 47.0 %    MCV 85 78 - 100 fl    MCH 27.7 26.5 - 33.0 pg    MCHC 32.7 31.5 - 36.5 g/dL    RDW 15.4 (H) 10.0 - 15.0 %    Platelet Count 319 150 - 450 10e9/L    Diff Method Automated Method     % Neutrophils 76.3 %    % Lymphocytes 12.1 %    % Monocytes 9.8 %    % Eosinophils 1.3 %    % Basophils 0.3 %    % Immature Granulocytes 0.2 %    Nucleated RBCs 0 0 /100    Absolute Neutrophil 8.5 (H) 1.6 - 8.3 10e9/L    Absolute Lymphocytes 1.4 0.8 - 5.3 10e9/L    Absolute Monocytes 1.1 0.0 - 1.3 10e9/L    Absolute Eosinophils 0.2 0.0 - 0.7 10e9/L    Absolute Basophils 0.0 0.0 - 0.2 10e9/L    Abs Immature Granulocytes 0.0 0 - 0.4 10e9/L    Absolute Nucleated RBC 0.0    INR   Result Value Ref Range     INR 1.07 0.86 - 1.14   Comprehensive metabolic panel   Result Value Ref Range    Sodium 140 133 - 144 mmol/L    Potassium 3.3 (L) 3.4 - 5.3 mmol/L    Chloride 104 94 - 109 mmol/L    Carbon Dioxide 31 20 - 32 mmol/L    Anion Gap 6 3 - 14 mmol/L    Glucose 92 70 - 99 mg/dL    Urea Nitrogen 8 7 - 30 mg/dL    Creatinine 0.46 (L) 0.52 - 1.04 mg/dL    GFR Estimate >90 >60 mL/min/1.7m2    GFR Estimate If Black >90 >60 mL/min/1.7m2    Calcium 9.6 8.5 - 10.1 mg/dL    Bilirubin Total 0.1 (L) 0.2 - 1.3 mg/dL    Albumin 3.1 (L) 3.4 - 5.0 g/dL    Protein Total 8.8 6.8 - 8.8 g/dL    Alkaline Phosphatase 246 (H) 40 - 150 U/L    ALT 43 0 - 50 U/L    AST 29 0 - 45 U/L   Lipase   Result Value Ref Range    Lipase 130 73 - 393 U/L   Troponin I   Result Value Ref Range    Troponin I ES <0.015 0.000 - 0.045 ug/L   UA with Microscopic reflex to Culture   Result Value Ref Range    Color Urine Yellow     Appearance Urine Slightly Cloudy     Glucose Urine Negative NEG^Negative mg/dL    Bilirubin Urine Negative NEG^Negative    Ketones Urine Negative NEG^Negative mg/dL    Specific Gravity Urine 1.011 1.003 - 1.035    Blood Urine Trace (A) NEG^Negative    pH Urine 7.5 (H) 5.0 - 7.0 pH    Protein Albumin Urine 10 (A) NEG^Negative mg/dL    Urobilinogen mg/dL Normal 0.0 - 2.0 mg/dL    Nitrite Urine Positive (A) NEG^Negative    Leukocyte Esterase Urine Negative NEG^Negative    Source Unspecified Urine     WBC Urine 4 (H) 0 - 2 /HPF    RBC Urine 2 0 - 2 /HPF    Bacteria Urine Many (A) NEG^Negative /HPF    Squamous Epithelial /HPF Urine 3 (H) 0 - 1 /HPF    Mucous Urine Present (A) NEG^Negative /LPF    Hyaline Casts 1 0 - 2 /LPF   Magnesium   Result Value Ref Range    Magnesium 2.0 1.6 - 2.3 mg/dL   Glucose by meter   Result Value Ref Range    Glucose 117 (H) 70 - 99 mg/dL   Comprehensive metabolic panel   Result Value Ref Range    Sodium 139 133 - 144 mmol/L    Potassium 3.6 3.4 - 5.3 mmol/L    Chloride 106 94 - 109 mmol/L    Carbon Dioxide 26 20 -  32 mmol/L    Anion Gap 8 3 - 14 mmol/L    Glucose 85 70 - 99 mg/dL    Urea Nitrogen 8 7 - 30 mg/dL    Creatinine 0.43 (L) 0.52 - 1.04 mg/dL    GFR Estimate >90 >60 mL/min/1.7m2    GFR Estimate If Black >90 >60 mL/min/1.7m2    Calcium 9.2 8.5 - 10.1 mg/dL    Bilirubin Total 0.2 0.2 - 1.3 mg/dL    Albumin 2.9 (L) 3.4 - 5.0 g/dL    Protein Total 7.8 6.8 - 8.8 g/dL    Alkaline Phosphatase 235 (H) 40 - 150 U/L    ALT 45 0 - 50 U/L    AST 34 0 - 45 U/L   CBC with platelets differential   Result Value Ref Range    WBC 8.6 4.0 - 11.0 10e9/L    RBC Count 4.58 3.8 - 5.2 10e12/L    Hemoglobin 12.6 11.7 - 15.7 g/dL    Hematocrit 38.5 35.0 - 47.0 %    MCV 84 78 - 100 fl    MCH 27.5 26.5 - 33.0 pg    MCHC 32.7 31.5 - 36.5 g/dL    RDW 15.7 (H) 10.0 - 15.0 %    Platelet Count 280 150 - 450 10e9/L    Diff Method Automated Method     % Neutrophils 74.7 %    % Lymphocytes 14.9 %    % Monocytes 9.3 %    % Eosinophils 0.8 %    % Basophils 0.1 %    % Immature Granulocytes 0.2 %    Nucleated RBCs 0 0 /100    Absolute Neutrophil 6.4 1.6 - 8.3 10e9/L    Absolute Lymphocytes 1.3 0.8 - 5.3 10e9/L    Absolute Monocytes 0.8 0.0 - 1.3 10e9/L    Absolute Eosinophils 0.1 0.0 - 0.7 10e9/L    Absolute Basophils 0.0 0.0 - 0.2 10e9/L    Abs Immature Granulocytes 0.0 0 - 0.4 10e9/L    Absolute Nucleated RBC 0.0    Glucose by meter   Result Value Ref Range    Glucose 89 70 - 99 mg/dL   EKG 12-lead, tracing only   Result Value Ref Range    Interpretation ECG Click View Image link to view waveform and result    ISTAT gases lactate rubina POCT   Result Value Ref Range    Ph Venous 7.39 7.32 - 7.43 pH    PCO2 Venous 52 (H) 40 - 50 mm Hg    PO2 Venous 58 (H) 25 - 47 mm Hg    Bicarbonate Venous 31 (H) 21 - 28 mmol/L    O2 Sat Venous 89 %    Lactic Acid 0.5 (L) 0.7 - 2.1 mmol/L   Urine Culture Aerobic Bacterial   Result Value Ref Range    Specimen Description Midstream Urine     Special Requests Specimen received in preservative     Culture Micro Culture in  progress      *Note: Due to a large number of results and/or encounters for the requested time period, some results have not been displayed. A complete set of results can be found in Results Review.      Recent Results (from the past 48 hour(s))   CT Abdomen Pelvis w/o Contrast    Narrative    Exam: CT abdomen pelvis without contrast 12/5/2017 10:56 PM.    History: Abdominal pain, nausea vomiting.  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    Technique: CT images from the lung bases through the symphysis pubis  without contrast    Findings:   Cholecystectomy. Mild reservoir effect of the common bile duct. Trace  pneumobilia, also seen on prior exam. This is likely postprocedural.  Otherwise the liver, pancreas, spleen, kidneys, and adrenal glands are  unremarkable. No abnormally dilated loops of small bowel or colon. No  free air or free fluid. No abdominal or pelvic lymphadenopathy. Small  hiatal hernia. Right inguinal postsurgical changes. Moderate aorto  iliac calcification. Bilateral stents in the common iliac arteries and  external iliac arteries. Patency of the vasculature cannot be  evaluated without contrast. Right buttocks neurostimulator device with  catheters in the spinal canal, tip not visualized.    The visualized lung bases are clear. No suspicious bony lesions.  Degenerative findings of the spine. Unchanged healing left-sided rib  fracture.      Impression    Impression:   1. New acute findings in the abdomen or pelvis.  2. Stable post procedural pneumobilia.    I have personally reviewed the examination and initial interpretation  and I agree with the  findings.    JM DAMIAN MD   CT Head w/o Contrast    Narrative    CT HEAD W/O CONTRAST 12/5/2017 10:57 PM    History: headache;     Comparison: CT 4/12/2017.    Technique: Using multidetector thin collimation helical acquisition  technique, axial, coronal and sagittal CT images from the skull base  to the vertex were obtained without intravenous contrast.     Findings:    There is no evidence of intracranial hemorrhage, mass effect, midline  shift, or abnormal extraaxial fluid collection. Unchanged age-related  calcifications of basal ganglia. The ventricles and sulci are within  normal limits for age. Mild patchy periventricular white matter  hypodensities most consistent with chronic small vessel ischemic  disease. Gray-white differentiation is intact throughout both cerebral  hemispheres.  The bony calvaria and the bones of the skull base appear  normal.  The visualized mastoid air cells and paranasal sinuses are  clear.       Impression    Impression:   1. No acute intracranial pathology  2. Age-related bilateral basal ganglia calcifications are unchanged.    I have personally reviewed the examination and initial interpretation  and I agree with the findings.    TESSIE LOPEZ MD                Pending Results:   Unresulted Labs Ordered in the Past 30 Days of this Admission     Date and Time Order Name Status Description    12/5/2017 2302 Urine Culture Aerobic Bacterial Preliminary                   Discharge Instructions and Follow-Up:     Discharge Procedure Orders  Reason for your hospital stay   Order Comments: Nausea, vomiting, and abdominal pain     Follow Up and recommended labs and tests   Order Comments: Follow up with primary care provider, Allan Casey, within 7 days for hospital follow- up.  Labs and testing at discretion of your PCP.     When to contact your care team   Order Comments: See your PCP if again developing nausea, vomiting, or abdominal pain, or if other new symptoms.  Return to  ED immediately if bloody vomit, bloody or black stools, severe abdominal pain, chest pain, shortness of breath, inability to tolerate oral intake, or any other new concerning symptoms.     Discharge Instructions   Order Comments: You were admitted to ED observation for nausea, vomiting, and abdominal pain.   Your urine showed signs of a UTI, which may be the cause for your symptoms.  Your other labs and tests were all reassuring and within normal limits.   You received 1 dose of IV antibiotics for treatment of your UTI. You received IV rehydration and anti-nausea medication, which helped to clear up your symptoms.   You received 1 dose of senna to help promote a bowel movement. Please take Miralax daily at home until having soft stools.  Take Levaquin once daily with food x 7 days at home for ongoing treatment of UTI.  Push clear liquid intake at home.     See your PCP if again developing nausea, vomiting, or abdominal pain, or if other new symptoms.  Return to ED immediately if bloody vomit, bloody or black stools, severe abdominal pain, chest pain, shortness of breath, inability to tolerate oral intake, or any other new concerning symptoms.     Full Code     Diet   Order Comments: Follow this diet upon discharge: soft diet as instructed by GI, same as prior to admission   Order Specific Question Answer Comments   Is discharge order? Yes             Attestation:  Polly Thomas PA-C  12/06/17

## 2017-12-06 NOTE — PROGRESS NOTES
12/6/17: Received epic notification that member was ED to hospital admit for N/V and UTI.  CM spoke with member.  CM also left vm for Jagruti BAUER.      aJmie Sharma RN, PHN  [facility]

## 2017-12-06 NOTE — PLAN OF CARE
Problem: Patient Care Overview  Goal: Plan of Care/Patient Progress Review  Outcome: No Change  Outpatient/Observation goals to be met before discharge home:    -Diagnostic tests and consults completed and resulted: NO   -Vital signs normal or at patient baseline: NO   -Tolerating oral intake to maintain hydration: NO, reports nausea and abdominal pain.

## 2017-12-06 NOTE — H&P
[facility] ED Observation Admission Note    Chief Complaint   Patient presents with     Nausea & Vomiting       Assessment/Plan:  Sonya Foote is a 67 year old transgender female with a history of schizoaffective disorder, gender change in 1974, DM2, diastolic CHF, asthma, COPD, seizure disorder, hypertension, MI, CVA (x3), bilateral BKA, dysphagia due to esophageal stricture as well as multiple other medical issues who presents via ambulance from her nursing facility for evaluation of nausea and vomiting.     1. UTI. UA in ED with + nitrite, 4 WBCs, many bacteria. She admits to dysuria and urinary frequency. Symptoms and lab findings consistent with UTI without complication at this time. No signs of sepsis as blood pressures normal, no tachycardia, afebrile, normal lactate. Urine culture is pending.  She does have a history of recurrent UTIs- followed by urology as outpatient and long term plan is for suprapubic cather once able to come off anticoagulation. CT negative for stones or hydronephrosis. She was last admitted 10/8/2017 for UTI secondary to Klebiella pneumoniae UTI resulting in sepsis. She was discharged on a 5-day course of Levaquin. Patient received 1 dose of Levaquin IV in the ED.   - one more dose IV Levaquin today  - transition to PO Levaquin for discharge  - UC pending  - IV fluids 125ml/hr    2. Nausea, vomiting, abdominal pain. LLQ abdominal pain, with diffusely tender abdomen, no focal tenderness. CT in ED was unremarkable for acute pathology. CMP did show elevated Alk phos at 246, however this was consistent with prior values since 08/14/2017. No evidence for acute biliary obstruction as remainder of labs and imaging normal. No findings consistent with diverticulitis or colitis on CT. Troponin x 1 negative and EKG showed no ischemic changes- less likely cardiac etiology. May be due to constipation as she states she has not had a BM in 3-4 days. Also may be due to UTI as above.   Mildly dry mucous  membranes but no signs of severe clinical dehydration as kidney function normal and stable, no tachycardia.  - Zofran, Reglan PRN nausea/vomiting  - IV rehydration 125ml/hr  - serial abdominal exams  - repeat CBC and CMP qam  - senna-docusate x 1   - Miralax prn constipation    3. Hypokalemia. 3.3 in ED. Received IV replacement in ED. Magnesium normal.  - repeat CMP in AM    4. Sinus bradycardia. In ED, HR 53-68. EKG showed sinus bradycardia. Persistently bradycardic in low 50s on floor. Symptoms are not characteristic of symptomatic bradycardia.  - continuous telemetry  - hold metoprolol    Chronic Medical Issues:  #Dysphagia 2/2 esophageal strictures s/p previous dilatations. #Chronic erosive GERD  Admitted on 11/26/2017 following food bolus impaction which required upper GI removal. Was started on a PPI and discharged on mechanical soft diet.   - ADAT  - continue daily Protonix  - follow up with GI in 3-4 weeks for dilation of esophageal stricture    #Allergic rhinitis  #COPD. Patient complains of chronic shortness of breath, but it is not worse than her baseline. Does admit to cough and yellow sputum production, rhinorrhea. However, lungs were clear, she was moving air well, O2 sats excellent at 96%. Do not think her presentation is consistent with COPD exacerbation, with chief complaint being nausea/vomiting.  Did not bring home inhalers.  - albuterol nebs q4h prn  - Duonebs q4h scheduled  - Pulmicort BID scheduled    #CAD with recent NSTEMI 9/2016 s/p stenting, completed 1 year of plavix. #HFpEF. #HTN  Stress test 10/2017 showing inferior wall MI without significant ischemia, Troponin x 1 negative in ED. EKG without ischemic changes. Does not complain of chest pain, shortness of breath worse than her baseline.  - continue PTA ASA, lisinopril, HCTZ  - hold metoprolol in setting of bradycardia (see above)  - continuous telemetry     #PAD s/p bilateral lower extremity amputations. Multiple previous surgeries.  "Bilateral femoral stenting in the past.   - Continue ASA      #DM2. Blood sugar 92 in ED, no signs of DKA. Last A1C was 5.1 on 11/20/2017.  - continue PTA metformin  - blood glucose checks TID AC  - hypoglycemia protocol      #Transgender  - continue PTA estrogen HRT      #Chronic pain. Followed in pain clinic has pain pump which delivers continuous fentanyl as well as a max of 7 bumps daily.  Patient did bring her button to give herself bumps.  - continue PTA Lyrica  - continue PTA pain regimen with pain pump         #Restless leg syndrome  - Continue PTA Lyrica      #Depression. #Anxiety. #Schizoaffective disorder  - continue PTA escitalopram.     #Insomnia  - continue PTA Trazodone.    #Seizure disorder  - continue PTA Keppra and phenytoin  - seizure precautions              Consults: none  FEN: ADAT, IV fluids  Code Status: Full  Disposition: pending clinical improvement, discharge to assisted living facility                  HPI:  Sonya Foote is a 67 year old female with a history of schizoaffective disorder, gender change, DM2, diastolic CHF, asthma, COPD, seizure disorder, hypertension, MI, CVA (x3), bilateral BKA, dysphagia due to esophageal stricture as well as multiple other medical issues who presents via ambulance from her nursing facility for evaluation of nausea and vomiting.      Per ED provider note, \"Patient complains she has been feeling unwell with nausea for the past week, but today had multiple episodes of vomiting in addition to her nausea today. She also complains of hot/cold flashes, as well as a sharp frontal headache intermittently throughout the week, though acutely worse throughout the day today. She is on a soft diet, though has had decreased appetite throughout the week. She reports no changes in bowel movements from her baseline. Additionally, she has noted some dysuria as well as mild lower abdominal pain over the past week. She denies chest pain. She does have chronic shortness of " "breath secondary to COPD, but states this is unchanged from baseline. No other symptoms or complaints at this time.\"    In the ED, VSS. Workup significant for mild leukocytosis at 11.2, mild hypokalemia at 3.3, elevated alk phos (consistent with prior on 11/27/17). UA showed + nitrite, 4 WBCs, many bacteria. Imaging  normal head CT, no acute findings on abdominal/pelvis CT, normal EKG, negative troponin x 1. She received IV fluids, potassium replacement, and antiemetics in the ED. She was admitted to ED observation for serial exams, IV rehydration, and antiemetics. On admission to the observation unit the patient was stable but did complain of headache located on the left forehead. She also has ongoing nausea, and had one episode of emesis in the AM. States her last BM was 3-4 days ago.      PCP: Allan Casey    History:    Past Medical History:   Diagnosis Date     Anemia      Antiplatelet or antithrombotic long-term use      Arthritis      AS (sickle cell trait) (H) 10/8/2013     Blind left eye      BPPV (benign paroxysmal positional vertigo)      Chronic back pain      COPD (chronic obstructive pulmonary disease) (H)      Coronary artery disease      CVA (cerebral infarction) 10/8/2012    x3, residual left sided weakness     Diastolic CHF (H) 9/4/2012     Dilantin toxicity 5/31/2013     Esophageal candidiasis (H)      GERD (gastroesophageal reflux disease)      Heart attack      Hernia, abdominal      History of blood transfusion     x5     History of thrombophlebitis      HTN (hypertension) 9/4/2012     Hyperlipidemia LDL goal <100 9/4/2012     Legally blind in right eye, as defined in [place]     No periphal vision right eye     Osteoporosis 1/21/2013     Other chronic pain      PAD (peripheral artery disease) (H)      Palpitations      Person who has had sex change operation 1/20/2014     Pneumonia      Schizoaffective disorder (H)      Seizure disorder (H) 9/4/2012     Sleep apnea     CPAP     Thrombosis of leg  "     Type 2 diabetes, HbA1C goal < 8% (H) 9/4/2012     Uncomplicated asthma      Unspecified cerebral artery occlusion with cerebral infarction        Past Surgical History:   Procedure Laterality Date     AMPUTATE LEG ABOVE KNEE Left 6/11/2016    Procedure: AMPUTATE LEG ABOVE KNEE;  Surgeon: Mello Rodriguez MD;  [facility]     AMPUTATE LEG BELOW KNEE Right 11/7/2016    Procedure: AMPUTATE LEG BELOW KNEE;  Surgeon: Savannah Durant MD;  [facility]     AMPUTATE REVISION STUMP LOWER EXTREMITY Right 11/11/2016    Procedure: AMPUTATE REVISION STUMP LOWER EXTREMITY;  Surgeon: Savannah Durant MD;  [facility]     AMPUTATE REVISION STUMP LOWER EXTREMITY Right 11/16/2016    Procedure: AMPUTATE REVISION STUMP LOWER EXTREMITY;  Surgeon: Savannah Durant MD;  [facility]     AMPUTATE TOE(S) Right 1/5/2016    Procedure: AMPUTATE TOE(S);  Surgeon: Mello Gaines DPM;  Location:  [facility]     ANGIOGRAM Bilateral 11/21/2014    Procedure: ANGIOGRAM;  Surgeon: Savannah Durant MD;  [facility]     ANGIOGRAM Left 1/16/2015    Procedure: ANGIOGRAM;  Surgeon: Savannah Durant MD;  [facility]     ANGIOGRAM Bilateral 9/14/2015    Procedure: ANGIOGRAM;  Surgeon: Savannah Durant MD;  [facility]     ANGIOGRAM Left 10/12/2015    Procedure: ANGIOGRAM;  Surgeon: Savannah Durant MD;  [facility]     ANGIOGRAM Right 6/6/2016    Procedure: ANGIOGRAM;  Surgeon: Savannah Durant MD;  [facility]     ANGIOPLASTY Right 6/6/2016    Procedure: ANGIOPLASTY;  Surgeon: Savannah Durant MD;  [facility]     APPENDECTOMY       BREAST SURGERY      right breast bx (benign)     CHOLECYSTECTOMY       COLONOSCOPY N/A 8/25/2014    Procedure: COLONOSCOPY;  Surgeon: Mello Ferrer MD;  [facility]     COLONOSCOPY WITH CO2 INSUFFLATION N/A 8/20/2014    Procedure: COLONOSCOPY WITH CO2 INSUFFLATION;  Surgeon: Duane, William Charles, MD;  [facility]     ENDARTERECTOMY FEMORAL  5/23/2014    Procedure: ENDARTERECTOMY FEMORAL;  Surgeon:  Jason Joshi MD;  [facility]     ESOPHAGOSCOPY, GASTROSCOPY, DUODENOSCOPY (EGD), COMBINED  12/14/2012    Procedure: COMBINED ESOPHAGOSCOPY, GASTROSCOPY, DUODENOSCOPY (EGD), BIOPSY SINGLE OR MULTIPLE;  ESOPHAGOSCOPY, GASTROSCOPY, DUODENOSCOPY (EGD), DILATATION ;  Surgeon: Elizabeth Stevenson MD;  Location:  GI     ESOPHAGOSCOPY, GASTROSCOPY, DUODENOSCOPY (EGD), COMBINED  12/31/2013    Procedure: COMBINED ESOPHAGOSCOPY, GASTROSCOPY, DUODENOSCOPY (EGD), BIOPSY SINGLE OR MULTIPLE;;  Surgeon: Clemente Lopez MD;  Location:  GI     ESOPHAGOSCOPY, GASTROSCOPY, DUODENOSCOPY (EGD), COMBINED  4/1/2014    Procedure: COMBINED ESOPHAGOSCOPY, GASTROSCOPY, DUODENOSCOPY (EGD);;  Surgeon: Clemente Lopez MD;  Location:  GI     ESOPHAGOSCOPY, GASTROSCOPY, DUODENOSCOPY (EGD), COMBINED  6/28/2014    Procedure: COMBINED ESOPHAGOSCOPY, GASTROSCOPY, DUODENOSCOPY (EGD);  Surgeon: Clemente Lopez MD;  Location:  GI     ESOPHAGOSCOPY, GASTROSCOPY, DUODENOSCOPY (EGD), COMBINED N/A 8/20/2014    Procedure: COMBINED ESOPHAGOSCOPY, GASTROSCOPY, DUODENOSCOPY (EGD), BIOPSY SINGLE OR MULTIPLE;  Surgeon: Duane, William Charles, MD;  Location:  OR     ESOPHAGOSCOPY, GASTROSCOPY, DUODENOSCOPY (EGD), COMBINED N/A 8/22/2014    Procedure: COMBINED ESOPHAGOSCOPY, GASTROSCOPY, DUODENOSCOPY (EGD), BIOPSY SINGLE OR MULTIPLE;  Surgeon: Mello Ferrer MD;  Location:  GI     ESOPHAGOSCOPY, GASTROSCOPY, DUODENOSCOPY (EGD), COMBINED N/A 10/2/2014    Procedure: COMBINED ESOPHAGOSCOPY, GASTROSCOPY, DUODENOSCOPY (EGD), BIOPSY SINGLE OR MULTIPLE;  Surgeon: Remy Haskins MD;  Location:  GI     ESOPHAGOSCOPY, GASTROSCOPY, DUODENOSCOPY (EGD), COMBINED Left 12/15/2014    Procedure: COMBINED ESOPHAGOSCOPY, GASTROSCOPY, DUODENOSCOPY (EGD), BIOPSY SINGLE OR MULTIPLE;  Surgeon: Remy Haskins MD;  Location:  GI     ESOPHAGOSCOPY, GASTROSCOPY, DUODENOSCOPY (EGD), COMBINED N/A 2/25/2015    Procedure: COMBINED ENDOSCOPIC ULTRASOUND,  ESOPHAGOSCOPY, GASTROSCOPY, DUODENOSCOPY (EGD), FINE NEEDLE ASPIRATE/BIOPSY;  Surgeon: Clemente Lugo MD;  [facility]     ESOPHAGOSCOPY, GASTROSCOPY, DUODENOSCOPY (EGD), COMBINED Left 2/25/2015    Procedure: COMBINED ESOPHAGOSCOPY, GASTROSCOPY, DUODENOSCOPY (EGD), BIOPSY SINGLE OR MULTIPLE;  Surgeon: Clemente Lugo MD;  [facility]     ESOPHAGOSCOPY, GASTROSCOPY, DUODENOSCOPY (EGD), COMBINED N/A 9/25/2016    Procedure: COMBINED ESOPHAGOSCOPY, GASTROSCOPY, DUODENOSCOPY (EGD);  Surgeon: Aziza Patiño MD;  [facility]     ESOPHAGOSCOPY, GASTROSCOPY, DUODENOSCOPY (EGD), COMBINED N/A 1/18/2017    Procedure: COMBINED ESOPHAGOSCOPY, GASTROSCOPY, DUODENOSCOPY (EGD), BIOPSY SINGLE OR MULTIPLE;  Surgeon: Clemente Lopez MD;  [facility]     ESOPHAGOSCOPY, GASTROSCOPY, DUODENOSCOPY (EGD), COMBINED N/A 11/26/2017    Procedure: COMBINED ESOPHAGOSCOPY, GASTROSCOPY, DUODENOSCOPY (EGD), REMOVE FOREIGN BODY;  Esophagogastroduodenoscopy with foreign body extraction  ;  Surgeon: Herberth Castrejon MD;  [facility]     ESOPHAGOSCOPY, GASTROSCOPY, DUODENOSCOPY (EGD), COMBINED N/A 11/26/2017    Procedure: COMBINED ESOPHAGOSCOPY, GASTROSCOPY, DUODENOSCOPY (EGD), REMOVE FOREIGN BODY;;  Surgeon: Herberth Castrejon MD;  [facility]     FASCIOTOMY LOWER EXTREMITY Left 6/10/2016    Procedure: FASCIOTOMY LOWER EXTREMITY;  Surgeon: Mello Rodriguez MD;  [facility]     HC CAPSULE ENDOSCOPY N/A 8/25/2014    Procedure: CAPSULE/PILL CAM ENDOSCOPY;  Surgeon: Remy Haskins MD;  [facility]     HC CAPSULE ENDOSCOPY N/A 10/2/2014    Procedure: CAPSULE/PILL CAM ENDOSCOPY;  Surgeon: Remy Haskins MD;  [facility]     ORTHOPEDIC SURGERY      broken wrist repair     SEX TRANSFORMATION SURGERY, MALE TO FEMALE      1974     SINUS SURGERY      cyst removed     TONSILLECTOMY       VASCULAR SURGERY      Left carotid stent       Family History   Problem Relation Age of Onset      Dementia Mother      Glaucoma Mother      DIABETES Mother      may have been type 1, childhood     Coronary Artery Disease Mother      MI     Glaucoma Father      DIABETES Father      may hev been type 1 - ??     Heart Failure Father      CANCER Maternal Aunt      leukemia     Schizophrenia Brother      Depression Brother      Suicide Sister      Depression Sister      DIABETES Sister      CANCER Maternal Aunt      ovarian     Glaucoma Maternal Grandmother      DIABETES Maternal Grandmother      Glaucoma Maternal Grandfather      DIABETES Maternal Grandfather      Glaucoma Paternal Grandmother      DIABETES Paternal Grandmother      Glaucoma Paternal Grandfather      DIABETES Paternal Grandfather      Breast Cancer Sister      CEREBROVASCULAR DISEASE Brother      Colon Cancer No family hx of        Social History     Social History     Marital status:      Spouse name: Jayde     Number of children: 2     Years of education: N/A     Occupational History     mental health worker      retired     Social History Main Topics     Smoking status: Current Every Day Smoker     Packs/day: 2.50     Years: 30.00     Types: Cigarettes, Cigars     Last attempt to quit: 11/1/2000     Smokeless tobacco: Never Used      Comment: Smoking 3 cig per day with each meal.      Alcohol use No     Drug use: No     Sexual activity: Not Currently     Other Topics Concern     Caffeine Concern No     1 in the morning     Social History Narrative      Her father was in the  and she moved around a lot when she was a kid, and has lived abroad including in [place] and the [facility].  She was previously employed as a mental health worker. Moved from [place] to [place] in 2012. She is currently living in assisted living ([facility] in [facility]).  Wife passed away 6/2017.            She has 2 adopted children (Kam and Prashanth)         Current Facility-Administered Medications on File Prior to Encounter:  neostigmine  (PROSTIGMINE) injection   glycopyrrolate (ROBINUL) injection     Current Outpatient Prescriptions on File Prior to Encounter:  pantoprazole (PROTONIX) 40 MG EC tablet Take 1 tablet (40 mg) by mouth 2 times daily   metFORMIN (GLUCOPHAGE-XR) 500 MG 24 hr tablet Take 1 tablet (500 mg) by mouth daily (with dinner)   brimonidine (ALPHAGAN) 0.2 % ophthalmic solution Place 1 drop into the right eye 3 times daily (Patient taking differently: Place 1 drop into the right eye 2 times daily )   dorzolamide-timolol (COSOPT) 2-0.5 % ophthalmic solution Place 1 drop into the right eye 2 times daily   sucralfate (CARAFATE) 1 GM tablet Take 1 tablet (1 g) by mouth 4 times daily May dissolve in 10 mL water is necessary. (Start upon completion of carafate suspension)   escitalopram (LEXAPRO) 5 MG tablet Take 2 tablets (10 mg) by mouth daily   albuterol (PROAIR HFA/PROVENTIL HFA/VENTOLIN HFA) 108 (90 BASE) MCG/ACT Inhaler Inhale 2 puffs into the lungs every 6 hours as needed for shortness of breath / dyspnea or wheezing   umeclidinium (INCRUSE ELLIPTA) 62.5 MCG/INH oral inhaler Inhale 1 puff into the lungs daily   ONDANSETRON PO Take 4 mg by mouth 3 times daily   cyclobenzaprine (FLEXERIL) 10 MG tablet Take 1 tablet (10 mg) by mouth 2 times daily as needed for muscle spasms   lidocaine (LIDODERM) 5 % Patch APPLY 1 TO 3 PATCHES TO PAINFUL AREA AT ONCE FOR UP TO 12 HOURS WITHIN A 24 HOUR PERIOD REMOVE AFTER 12 HOURS   blood glucose monitoring (ONE TOUCH ULTRASOFT) lancets Use to test blood sugar 3 times daily or as directed.   blood glucose monitoring (ONE TOUCH ULTRA) test strip Use to test blood sugars 3 times daily or as directed.   metoprolol (TOPROL-XL) 25 MG 24 hr tablet TAKE 1 TABLET (25 MG) BY MOUTH DAILY   traZODone (DESYREL) 100 MG tablet Take 1.5 tablets (150 mg) by mouth At Bedtime   order for DME Full electric hospital bed with half railsDx: L70630, I110, D617Vmieeq of need: lifetime   order for DME Wheel Chair Cushion: 18 x  18 inch Roho cushion   order for DME Hospital bed with side rails   polyethylene glycol (MIRALAX/GLYCOLAX) Packet Take 17 g by mouth daily as needed Dissolved in water or juice    ACETAMINOPHEN PO Take 1,000 mg by mouth every 6 hours as needed for fever Taking q4-6 hours, per MAR   pregabalin (LYRICA) 75 MG capsule Take 2 capsules (150 mg) by mouth 2 times daily   cetirizine (ZYRTEC) 10 MG tablet Take 1 tablet (10 mg) by mouth daily as needed for allergies   diclofenac (VOLTAREN) 1 % GEL topical gel Apply 2 g topically 4 times daily to hands   EPINEPHrine (EPIPEN JR) 0.15 MG/0.3ML injection Inject 0.3 mLs (0.15 mg) into the muscle as needed for anaphylaxis   lisinopril (PRINIVIL/ZESTRIL) 10 MG tablet Take 1 tablet (10 mg) by mouth daily   risperiDONE (RISPERDAL) 0.5 MG tablet Take 0.5 mg by mouth At Bedtime   ferrous sulfate (IRON) 325 (65 FE) MG tablet Take 1 tablet (325 mg) by mouth 2 times daily With meals   order for DME Equipment being ordered: CPAP supplies mask, hose, filters, cushionfax to [facility] at [phone]   order for DME Equipment being ordered: CPAP supplies mask, hose, filters, cushion fax to [facility] at [phone]: 10 Device Refills: prn Class: Local Print Start: 2/10/2017   order for DME Equipment being ordered: Nebulizer and tubing supplies   albuterol (2.5 MG/3ML) 0.083% neb solution INHALE 1 VIAL VIA NEBULIZER EVERY 6 HOURS AS NEEDED   CYANOCOBALAMIN PO Take 2,000 mcg by mouth daily   Nutritional Supplements (RENÉE) PACK Take 1 packet by mouth 2 times daily   fluticasone (FLONASE) 50 MCG/ACT nasal spray Spray 1 spray into both nostrils daily   ADVAIR DISKUS 250-50 MCG/DOSE diskus inhaler Inhale 1 puff into the lungs 2 times daily    calcium citrate-vitamin D (CITRACAL) 315-250 MG-UNIT TABS Take 2 tablets by mouth daily   hydrochlorothiazide (MICROZIDE) 12.5 MG capsule Take 1 capsule (12.5 mg) by mouth daily (Patient taking differently: Take 12.5 mg by mouth daily Hold for  sbp<90)   estradiol (ESTRACE) 1 MG tablet Take 1 tablet (1 mg) by mouth daily   levETIRAcetam (KEPPRA) 500 MG tablet Take 1 tablet (500 mg) by mouth 2 times daily   aspirin EC 81 MG EC tablet Take 1 tablet (81 mg) by mouth daily (Patient taking differently: Take 81 mg by mouth every morning )   blood glucose monitoring (ONE TOUCH ULTRA 2) meter device kit Use to test blood sugars 3 times daily or as directed.   phenytoin 200 MG CAPS Take 200 mg by mouth 2 times daily (Patient taking differently: Take 200 mg by mouth 2 times daily (takes at 8 AM and 8 PM))   dorzolamide (TRUSOPT) 2 % ophthalmic solution Place 1 drop into both eyes 2 times daily    zinc 50 MG TABS Take 1 tablet by mouth daily   nitroglycerin (NITROSTAT) 0.4 MG SL tablet Place 1 tablet (0.4 mg) under the tongue every 5 minutes as needed for chest pain if you are still having symptoms after 3 doses (15 minutes) call 911.   MEDICATION GIVEN BY INTRATHECAL PUMP - INSTRUCTION continuous May 19, 2017 - per [facility] [place] [phone]:Conc: Bupivacaine 20 mg/mL and Fentanyl 2000 mcg/mL.Continuous: Bupivacaine 7.265 mg/day and Fentanyl 726.5 mcg/day.Boluses: Up to 7 boluses per 24-hr period of Bupivicaine 0.599 mg and Fentanyl 59.9 mcgPump Refill Date at [facility]: 7/2/17   latanoprost (XALATAN) 0.005 % ophthalmic solution Place 1 drop into both eyes At Bedtime   atorvastatin (LIPITOR) 40 MG tablet Take 1 tablet (40 mg) by mouth daily (Patient taking differently: Take 40 mg by mouth At Bedtime )   order for DME Equipment being ordered: Glucerna daily shakes with each meal   prochlorperazine (COMPAZINE) 10 MG tablet Take 1 tablet (10 mg) by mouth every 8 hours as needed   ORDER FOR DME Equipment being ordered: Power Wheelchair   ORDER FOR DME Equipment being ordered: Depends briefs   Cholecalciferol (VITAMIN D) 2000 UNITS tablet Take 2,000 Units by mouth daily.   Multiple Vitamin (MULTIVITAMIN OR) Take 1 tablet by  mouth daily        Data:    Results for orders placed or performed during the hospital encounter of 12/05/17   CT Head w/o Contrast    Narrative    CT HEAD W/O CONTRAST 12/5/2017 10:57 PM    History: headache;     Comparison: CT 4/12/2017.    Technique: Using multidetector thin collimation helical acquisition  technique, axial, coronal and sagittal CT images from the skull base  to the vertex were obtained without intravenous contrast.     Findings:    There is no evidence of intracranial hemorrhage, mass effect, midline  shift, or abnormal extraaxial fluid collection. Unchanged age-related  calcifications of basal ganglia. The ventricles and sulci are within  normal limits for age. Mild patchy periventricular white matter  hypodensities most consistent with chronic small vessel ischemic  disease. Gray-white differentiation is intact throughout both cerebral  hemispheres.  The bony calvaria and the bones of the skull base appear  normal.  The visualized mastoid air cells and paranasal sinuses are  clear.       Impression    Impression:   1. No acute intracranial pathology  2. Age-related bilateral basal ganglia calcifications are unchanged.    I have personally reviewed the examination and initial interpretation  and I agree with the findings.    TESSIE LOPEZ MD   CT Abdomen Pelvis w/o Contrast    Narrative    Exam: CT abdomen pelvis without contrast 12/5/2017 10:56 PM.    History: Abdominal pain, nausea vomiting.  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    Technique: CT images from the lung bases through the symphysis pubis  without contrast    Findings:    Cholecystectomy. Mild reservoir effect of the common bile duct. Trace  pneumobilia, also seen on prior exam. This is likely postprocedural.  Otherwise the liver, pancreas, spleen, kidneys, and adrenal glands are  unremarkable. No abnormally dilated loops of small bowel or colon. No  free air or free fluid. No abdominal or pelvic lymphadenopathy. Small  hiatal hernia. Right inguinal postsurgical changes. Moderate aorto  iliac calcification. Bilateral stents in the common iliac arteries and  external iliac arteries. Patency of the vasculature cannot be  evaluated without contrast. Right buttocks neurostimulator device with  catheters in the spinal canal, tip not visualized.    The visualized lung bases are clear. No suspicious bony lesions.  Degenerative findings of the spine. Unchanged healing left-sided rib  fracture.      Impression    Impression:   1. New acute findings in the abdomen or pelvis.  2. Stable post procedural pneumobilia.    I have personally reviewed the examination and initial interpretation  and I agree with the findings.    JM DAMIAN MD   CBC with platelets differential   Result Value Ref Range    WBC 11.2 (H) 4.0 - 11.0 10e9/L    RBC Count 4.70 3.8 - 5.2 10e12/L    Hemoglobin 13.0 11.7 - 15.7 g/dL    Hematocrit 39.7 35.0 - 47.0 %    MCV 85 78 - 100 fl    MCH 27.7 26.5 - 33.0 pg    MCHC 32.7 31.5 - 36.5 g/dL    RDW 15.4 (H) 10.0 - 15.0 %    Platelet Count 319 150 - 450 10e9/L    Diff Method Automated Method     % Neutrophils 76.3 %    % Lymphocytes 12.1 %    % Monocytes 9.8 %    % Eosinophils 1.3 %    % Basophils 0.3 %    % Immature Granulocytes 0.2 %    Nucleated RBCs 0 0 /100    Absolute Neutrophil 8.5 (H) 1.6 - 8.3 10e9/L    Absolute Lymphocytes 1.4 0.8 - 5.3 10e9/L    Absolute Monocytes 1.1 0.0 - 1.3 10e9/L    Absolute Eosinophils 0.2 0.0 - 0.7 10e9/L    Absolute Basophils 0.0 0.0 - 0.2 10e9/L    Abs Immature Granulocytes 0.0 0 - 0.4 10e9/L    Absolute Nucleated RBC 0.0    INR   Result  Value Ref Range    INR 1.07 0.86 - 1.14   Comprehensive metabolic panel   Result Value Ref Range    Sodium 140 133 - 144 mmol/L    Potassium 3.3 (L) 3.4 - 5.3 mmol/L    Chloride 104 94 - 109 mmol/L    Carbon Dioxide 31 20 - 32 mmol/L    Anion Gap 6 3 - 14 mmol/L    Glucose 92 70 - 99 mg/dL    Urea Nitrogen 8 7 - 30 mg/dL    Creatinine 0.46 (L) 0.52 - 1.04 mg/dL    GFR Estimate >90 >60 mL/min/1.7m2    GFR Estimate If Black >90 >60 mL/min/1.7m2    Calcium 9.6 8.5 - 10.1 mg/dL    Bilirubin Total 0.1 (L) 0.2 - 1.3 mg/dL    Albumin 3.1 (L) 3.4 - 5.0 g/dL    Protein Total 8.8 6.8 - 8.8 g/dL    Alkaline Phosphatase 246 (H) 40 - 150 U/L    ALT 43 0 - 50 U/L    AST 29 0 - 45 U/L   Lipase   Result Value Ref Range    Lipase 130 73 - 393 U/L   Troponin I   Result Value Ref Range    Troponin I ES <0.015 0.000 - 0.045 ug/L   UA with Microscopic reflex to Culture   Result Value Ref Range    Color Urine Yellow     Appearance Urine Slightly Cloudy     Glucose Urine Negative NEG^Negative mg/dL    Bilirubin Urine Negative NEG^Negative    Ketones Urine Negative NEG^Negative mg/dL    Specific Gravity Urine 1.011 1.003 - 1.035    Blood Urine Trace (A) NEG^Negative    pH Urine 7.5 (H) 5.0 - 7.0 pH    Protein Albumin Urine 10 (A) NEG^Negative mg/dL    Urobilinogen mg/dL Normal 0.0 - 2.0 mg/dL    Nitrite Urine Positive (A) NEG^Negative    Leukocyte Esterase Urine Negative NEG^Negative    Source Unspecified Urine     WBC Urine 4 (H) 0 - 2 /HPF    RBC Urine 2 0 - 2 /HPF    Bacteria Urine Many (A) NEG^Negative /HPF    Squamous Epithelial /HPF Urine 3 (H) 0 - 1 /HPF    Mucous Urine Present (A) NEG^Negative /LPF    Hyaline Casts 1 0 - 2 /LPF   Magnesium   Result Value Ref Range    Magnesium 2.0 1.6 - 2.3 mg/dL   EKG 12-lead, tracing only   Result Value Ref Range    Interpretation ECG Click View Image link to view waveform and result    ISTAT gases lactate rubina POCT   Result Value Ref Range    Ph Venous 7.39 7.32 - 7.43 pH    PCO2 Venous 52 (H) 40  - 50 mm Hg    PO2 Venous 58 (H) 25 - 47 mm Hg    Bicarbonate Venous 31 (H) 21 - 28 mmol/L    O2 Sat Venous 89 %    Lactic Acid 0.5 (L) 0.7 - 2.1 mmol/L   Urine Culture Aerobic Bacterial   Result Value Ref Range    Specimen Description Midstream Urine     Special Requests Specimen received in preservative     Culture Micro PENDING      *Note: Due to a large number of results and/or encounters for the requested time period, some results have not been displayed. A complete set of results can be found in Results Review.             EKG Interpretation:    12/5/2017  19:34  Symptoms at time of EKG: nausea and vomiting   Rhythm: sinus bradycardia  Rate: 57 bpm  Axis: Normal  Ectopy: none  Conduction: normal  ST Segments/ T Waves: No ST-T wave changes  Comparison to prior on 10/08/17: HR now 57 from 95 bpm; otherwise no significant changes  Clinical Impression: sinus bradycardia, otherwise morphology unchanged from previous           ROS:    Review Of Systems  General: positive for sweats and chills. Negative for measured fevers. Positive for fatigue.  Skin: No rashes.  Eyes: No acute vision changes.  Ears/Nose/Throat: Positive for rhinorrhea. No sore throat.  Respiratory: Positive for shortness of breath, cough, and wheezing. Negative for hemoptysis.  Cardiovascular: Negative for chest pain, palpitations.  Gastrointestinal: Positive for abdominal pain, nausea, vomiting, constipation. Positive for hematemesis. Negative for black stools. Positive for hemorroids.  Genitourinary: Positive for dysuria, urinary frequency.   Musculoskeletal: Positive for chronic back pain.  Neurologic: Positive for headache. Positive for associated pre-syncope. Negative for head trauma recently.    10 point ROS negative other than the symptoms noted above.      Exam:    Vitals:  B/P: 156/81, T: 97.6, P: Data Unavailable, R: 16    General: alert, appears uncomfortable, sometimes moaning in bed, but in NAD. Afebrile.  Skin: no suspicious lesions or  rashes   Head: Normocephalic.  EENT: Sclera anicteric. Cataracts bilaterally. Glacomatous changes bilaterally. Right pupil fixed constriction. Oropharynx: tacky MMs.   Neck: Neck supple. No lymphadenopathy. Carotids without bruits.  Respiratory: No distress. Equal inspiration to bilateral bases. Lungs CTAB- no crackles, wheezes, or rales.  Cardiovascular: Mildly bradycardic, but normal S1/S2. No murmurs, clicks gallops or rub.    Gastrointestinal: Abdomen non-distended, diffusely tender. BS normal.   Musculoskeletal: bilateral BKA.  Neurologic: Alert. Follows commands. No dysarthria. EOMI. Facial movements symmetric. Sensation to light touch in CN V nerve root distributions.  strength 5/5 right, 4/5 left.   Psychiatric: mentation appears normal.              Signed:  Polly Thomas PA-C  December 6, 2017 at 8:04 AM

## 2017-12-06 NOTE — PROGRESS NOTES
Emergency Medicine Observation Attending note    The patient was independently seen and examined by me. The chart, vital signs, and labs were reviewed. The patient's findings were discussed with the KENNY on the observation unit, and I agree with the plan, as noted below.    68 yo female with multiple comorbidities, was admitted to the observation unit after presenting to the ER with c/o N/V. She reports nausea for one week, though the vomiting just started yesterday. Mulitiple episodes of non-bloody emesis. No diarrhea. She additionally c/o intermittent HA, as well as dysuria for a couple of weeks. She reports h/o UTIs in the past. W/u in the ED, including basic labs, head CT, and abd CT neg. UA only showed 4 WBC, though was positive for nitrites. She had ongoing n/v despite antiemetics in the ED, so was admitted to the obs unit for sx control. This am she reports ongoing nausea and HA, though has not had any recent vomiting.    /77  Temp 97.8  F (36.6  C) (Oral)  Resp 12  SpO2 100%    Exam:  General: awake, alert, NAD  HEENT: NC/AT, oropharynx moist and clear  Neck: supple  Lungs: CTA-B  Heart: RRR, no M/R/G  Abd: soft, ND, mild suprapubic tenderness as well as epigastric tenderness without guarding  Ext: bilateral AKA    Assessment/plan:  1. N/V - cause unclear. No worrisome findings on labs or abd CT. Will continue to treat symptomatically and replacing fluids.  2. Dysuria - only 4 WBCs in the urine, though she does have positive nitrite - which makes me inclined to believe that this is a real infection. She states that sx are similar to those she's had in the past. She's PCN allergic. Will give dose of Levaquin and cx.

## 2017-12-06 NOTE — PROGRESS NOTES
[facility]  Patient is currently open to home care services with [facility].  The patient is currently receiving RN/OT services.  [facility]  and team have been notified of patient admission.  [facility] liaison will continue to follow patient during stay.  If appropriate provide orders to resume home care at time of discharge.    Thank you  Keshia Schwab RN, BSN  [facility] Liaison  [phone]

## 2017-12-06 NOTE — ED NOTES
BIBA from assisted living for nausea and vomiting that started this morning. Pt developed headache this evening. Hx DM, bilateral AKA, chronic pain.

## 2017-12-07 NOTE — PLAN OF CARE
Problem: Patient Care Overview  Goal: Plan of Care/Patient Progress Review  Outcome: Adequate for Discharge Date Met: 12/06/17  Pt ready for discharge. PIV removed. Discharge teaching complete, pt denies further questions. [facility] ride set up and pt will discharge on stretcher back to assisted living. Belongings packed up and sent with pt.

## 2017-12-07 NOTE — PROGRESS NOTES
Social Work Services Progress Note    Hospital Day: 2  Date of Initial Social Work Evaluation:  N/A  Collaborated with:  RN    Data:  On-call SW was paged for assistance with transportation for Pt who is discharged. Pt's normal transportation service through [employer] is not available at this hour. Pt requires a w/c transport due to bilateral AKA.     Intervention:  Writer spoke with RN about the above situation and reviewed Pt's insurance. Pt is returning to her assisted living tonight. Writer advised using [facility] ([phone]) as Pt has [employer] and typically has transportation coverage. [facility] should bill Pt's insurance, though they will not guarantee coverage at this hour. RN will call to arrange the ride.    Assessment:  SW consulted to assist with concerns about Pt's discharge transportation.    Plan:    Anticipated Disposition:  Home, no needs identified    Barriers to d/c plan:  Transportation arrangement.    Follow Up:  RN to call [facility].      MARGARITA Woodward  Emergency Department   Pager: [phone]

## 2017-12-08 LAB
BACTERIA SPEC CULT: ABNORMAL
Lab: ABNORMAL
SPECIMEN SOURCE: ABNORMAL

## 2017-12-08 NOTE — PROGRESS NOTES
12/7/17: CM received notification that member was d/c from observation and on atbx.  CM attempted to contact member - however goes to  (member dropped phone in water - still waiting to be fixed).  CM left vm at nurses office at [facility].     Jamie Sharma RN, N  [facility]

## 2017-12-11 NOTE — PROGRESS NOTES
12/8/17: CM placed call to [facility] to f/u with member and nursing staff regarding hospitalization and annual HRA visit next week.  No answer and no call back.     Jamie Sharma RN, N  [facility]

## 2017-12-11 NOTE — PROGRESS NOTES
12/11/17: CM placed call to [facility] - transferred to [facility] - she stated that member's phone is still being worked on - son is taking care of it.  She will remind member that CM is coming tomorrow between 11-12.  CM also placed call and left a vm for Irene SALTER - left vm.     Received call back from Irene - she will have service list and meds printed out and will also remind member of CM visit.     Jamie Sharma RN, PHN  [facility]

## 2017-12-12 ENCOUNTER — CARE COORDINATION (OUTPATIENT)
Dept: GERIATRIC MEDICINE | Facility: CLINIC | Age: 67
End: 2017-12-12

## 2017-12-12 ENCOUNTER — OFFICE VISIT (OUTPATIENT)
Dept: INTERNAL MEDICINE | Facility: CLINIC | Age: 67
End: 2017-12-12
Payer: COMMERCIAL

## 2017-12-12 VITALS
SYSTOLIC BLOOD PRESSURE: 128 MMHG | OXYGEN SATURATION: 96 % | DIASTOLIC BLOOD PRESSURE: 70 MMHG | HEART RATE: 76 BPM | TEMPERATURE: 98.6 F | BODY MASS INDEX: 21.46 KG/M2 | WEIGHT: 137 LBS

## 2017-12-12 DIAGNOSIS — G89.4 CHRONIC PAIN SYNDROME: ICD-10-CM

## 2017-12-12 DIAGNOSIS — Z76.89 HEALTH CARE HOME: Chronic | ICD-10-CM

## 2017-12-12 DIAGNOSIS — Z09 HOSPITAL DISCHARGE FOLLOW-UP: Primary | ICD-10-CM

## 2017-12-12 DIAGNOSIS — G44.209 TENSION HEADACHE: ICD-10-CM

## 2017-12-12 DIAGNOSIS — N39.0 URINARY TRACT INFECTION WITHOUT HEMATURIA, SITE UNSPECIFIED: ICD-10-CM

## 2017-12-12 PROCEDURE — 99214 OFFICE O/P EST MOD 30 MIN: CPT | Performed by: INTERNAL MEDICINE

## 2017-12-12 NOTE — NURSING NOTE
"Chief Complaint   Patient presents with     Hospital F/U       Initial /70  Pulse 76  Temp 98.6  F (37  C) (Oral)  Wt 137 lb (62.1 kg)  SpO2 96%  BMI 21.46 kg/m2 Estimated body mass index is 21.46 kg/(m^2) as calculated from the following:    Height as of 10/18/17: 5' 7\" (1.702 m).    Weight as of this encounter: 137 lb (62.1 kg).  Medication Reconciliation: complete   Daniela Hancock CMA      "

## 2017-12-12 NOTE — PROGRESS NOTES
SUBJECTIVE:   Sonya Foote is a 67 year old female who presents to clinic today for the following health issues:    Hospital Follow-up Visit:    Hospital/Nursing Home/IP Rehab Facility: [facility]  Date of Admission: 12/5/17  Date of Discharge: 12/6/17  Reason(s) for Admission: UTI            Problems taking medications regularly:  None       Medication changes since discharge: NOTED       Problems adhering to non-medication therapy:  None    Summary of hospitalization:  [facility] discharge summary reviewed  Diagnostic Tests/Treatments reviewed.  Follow up needed: NOTED  Other Healthcare Providers Involved in Patient s Care:         Homecare  Update since discharge: stable.     Post Discharge Medication Reconciliation: discharge medications reconciled, continue medications without change.  Plan of care communicated with patient     Coding guidelines for this visit:  Type of Medical   Decision Making Face-to-Face Visit       within 7 Days of discharge Face-to-Face Visit        within 14 days of discharge   Moderate Complexity [id number]   High Complexity [id number]               Hospital Course:      1. UTI. UA in ED with + nitrite, 4 WBCs, many bacteria. She admits to dysuria and urinary frequency. Symptoms and lab findings consistent with UTI without complication at this time. No signs of sepsis as blood pressures normal, no tachycardia, afebrile, normal lactate.  She does have a history of recurrent UTIs- followed by urology as outpatient and long term plan is for suprapubic cather once able to come off anticoagulation. CT negative for stones or hydronephrosis. She was last admitted 10/8/2017 for UTI secondary to Klebiella pneumoniae UTI resulting in sepsis. At that time, she was discharged on a 5-day course of Levaquin.  Today, patient received 1 dose of Levaquin IV in the ED. She started to feel much better later in the day. She received continued IV fluids and tolerated oral soft diet  intake. Transition to PO Levaquin for discharge.   Urine culture pending- follow up on results with PCP.         2. Nausea, vomiting, abdominal pain. LLQ abdominal pain, with diffusely tender abdomen, no focal tenderness. CT in ED was unremarkable for acute pathology. CMP did show elevated Alk phos at 246, however this was consistent with prior values since 08/14/2017. No evidence for acute biliary obstruction as remainder of labs and imaging normal. No findings consistent with diverticulitis or colitis on CT. Troponin x 1 negative and EKG showed no ischemic changes- less likely cardiac etiology. May be due to constipation as she states she has not had a BM in 3-4 days. Also may be due to UTI as above.   Mildly dry mucous membranes but no signs of severe clinical dehydration as kidney function normal and stable, no tachycardia. One episode of emesis in AM after admission to obs unit.  She received sennakot and did not have a BM. Recommend she take her Miralax daily at home until having soft BMs.   She received Zofran for antiemetic and started to feel much better later in the day- no further emesis while admitted. She reported an improvement in her abdominal pain and upon repeat examination, abdomen was no longer tender to palpation at all.  She received continued IV fluids and tolerated oral soft diet intake.   She was discharged in improving condition with plan for continued antiemetics PRN at home, for which she already has a prescription.     3. Hypokalemia. 3.3 in ED. Received IV replacement in ED. Magnesium normal. Upon repeat CMP, K 3.6. No further emesis.       4. Sinus bradycardia. In ED, HR 53-68. EKG showed sinus bradycardia. Persistently bradycardic in low 50s on floor. Symptoms are not characteristic of symptomatic bradycardia. Held metoprolol dose in AM. HR normalized to low 60s. She has been asymptomatic throughout. Return to home dose metoprolol upon discharge.     Other concern:  1. Frontal HA's x 1  month    Problem list and histories reviewed & adjusted, as indicated.  Additional history: as documented    Patient Active Problem List   Diagnosis     Hyperlipidemia LDL goal <100     Seizure disorder (H)     ACP (advance care planning)     Osteoporosis     Schizoaffective disorder (H)     AS (sickle cell trait) (H)     Vertigo     Person who has had sex change operation     Claudication in peripheral vascular disease (H)     Intestinal malabsorption     GIB (gastrointestinal bleeding)     Cervicalgia     Health Care Home     Asthma     Adjustment disorder with depressed mood     Chronic pain syndrome     Open-angle glaucoma     Hx of colonic polyp     Old myocardial infarction     Iron deficiency anemia     Late effect of stroke     Degeneration of intervertebral disc of lumbosacral region     Thoracic or lumbosacral neuritis or radiculitis     Cerebral infarction due to occlusion or stenosis of carotid artery     Disorder of bone and cartilage     Hereditary and idiopathic peripheral neuropathy     Androgen insensitivity syndrome     PAD (peripheral artery disease) (H)     Chronic systolic congestive heart failure (H)     Stenosis of carotid artery     Osteoarthritis     Pain in both upper extremities     Atherosclerotic peripheral vascular disease with rest pain (H)     Essential hypertension     Cellulitis of right ankle     Angina pectoris, crescendo (H)     Type 2 diabetes mellitus with diabetic peripheral angiopathy without gangrene, without long-term current use of insulin (H)     Anemia, unspecified type     Critical lower limb ischemia     Testicular feminization     Anxiety disorder due to general medical condition     Central retinal artery occlusion     Lumbosacral radiculitis     Peripheral neuropathy     Osteopenia     Prediabetes     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 (H)     Primary open angle glaucoma of both eyes, severe stage     Pseudophakia of right eye     Cataract,  left eye     Diabetes mellitus type 2 without retinopathy (H)     Pyelonephritis     Chronic obstructive pulmonary disease, unspecified COPD type (H)     JOSE (obstructive sleep apnea)     Complex sleep apnea syndrome     Coronary artery disease of native artery of native heart with stable angina pectoris (H)     Hyperlipidemia     Ischemic cardiomyopathy     Bee sting reaction, undetermined intent, subsequent encounter     Functional incontinence     Personal history of urinary tract infection     Dysphagia     Single seizure (H)     Essential hypertension with goal blood pressure less than 130/80     Hyperlipidemia LDL goal <70     UTI (urinary tract infection)     Food impaction of esophagus     Esophageal foreign body     Nausea & vomiting     Past Surgical History:   Procedure Laterality Date     AMPUTATE LEG ABOVE KNEE Left 6/11/2016    Procedure: AMPUTATE LEG ABOVE KNEE;  Surgeon: Mello Rodriguez MD;  [facility]     AMPUTATE LEG BELOW KNEE Right 11/7/2016    Procedure: AMPUTATE LEG BELOW KNEE;  Surgeon: Savannah Durant MD;  [facility]     AMPUTATE REVISION STUMP LOWER EXTREMITY Right 11/11/2016    Procedure: AMPUTATE REVISION STUMP LOWER EXTREMITY;  Surgeon: Savannah Durant MD;  [facility]     AMPUTATE REVISION STUMP LOWER EXTREMITY Right 11/16/2016    Procedure: AMPUTATE REVISION STUMP LOWER EXTREMITY;  Surgeon: Savannah Durant MD;  [facility]     AMPUTATE TOE(S) Right 1/5/2016    Procedure: AMPUTATE TOE(S);  Surgeon: Mello Gaines DPM;  [facility]     ANGIOGRAM Bilateral 11/21/2014    Procedure: ANGIOGRAM;  Surgeon: Savannah Durant MD;  [facility]     ANGIOGRAM Left 1/16/2015    Procedure: ANGIOGRAM;  Surgeon: Savannah Durant MD;  [facility]     ANGIOGRAM Bilateral 9/14/2015    Procedure: ANGIOGRAM;  Surgeon: Savannah Durant MD;  [facility]     ANGIOGRAM Left 10/12/2015    Procedure: ANGIOGRAM;  Surgeon: Savannah Durant MD;  [facility]     ANGIOGRAM Right 6/6/2016     Procedure: ANGIOGRAM;  Surgeon: Savannah Durant MD;  [facility]     ANGIOPLASTY Right 6/6/2016    Procedure: ANGIOPLASTY;  Surgeon: Savannah Durant MD;  [facility]     APPENDECTOMY       BREAST SURGERY      right breast bx (benign)     CHOLECYSTECTOMY       COLONOSCOPY N/A 8/25/2014    Procedure: COLONOSCOPY;  Surgeon: Mello Ferrer MD;  [facility]     COLONOSCOPY WITH CO2 INSUFFLATION N/A 8/20/2014    Procedure: COLONOSCOPY WITH CO2 INSUFFLATION;  Surgeon: Duane, William Charles, MD;  [facility]     ENDARTERECTOMY FEMORAL  5/23/2014    Procedure: ENDARTERECTOMY FEMORAL;  Surgeon: Jason Joshi MD;  [facility]     ESOPHAGOSCOPY, GASTROSCOPY, DUODENOSCOPY (EGD), COMBINED  12/14/2012    Procedure: COMBINED ESOPHAGOSCOPY, GASTROSCOPY, DUODENOSCOPY (EGD), BIOPSY SINGLE OR MULTIPLE;  ESOPHAGOSCOPY, GASTROSCOPY, DUODENOSCOPY (EGD), DILATATION ;  Surgeon: Elizabeth Stevenson MD;  Location:  [facility]     ESOPHAGOSCOPY, GASTROSCOPY, DUODENOSCOPY (EGD), COMBINED  12/31/2013    Procedure: COMBINED ESOPHAGOSCOPY, GASTROSCOPY, DUODENOSCOPY (EGD), BIOPSY SINGLE OR MULTIPLE;;  Surgeon: Clemente Lopez MD;  [facility]     ESOPHAGOSCOPY, GASTROSCOPY, DUODENOSCOPY (EGD), COMBINED  4/1/2014    Procedure: COMBINED ESOPHAGOSCOPY, GASTROSCOPY, DUODENOSCOPY (EGD);;  Surgeon: Clemente Lopez MD;  [facility]     ESOPHAGOSCOPY, GASTROSCOPY, DUODENOSCOPY (EGD), COMBINED  6/28/2014    Procedure: COMBINED ESOPHAGOSCOPY, GASTROSCOPY, DUODENOSCOPY (EGD);  Surgeon: Clemente Lopez MD;  [facility]     ESOPHAGOSCOPY, GASTROSCOPY, DUODENOSCOPY (EGD), COMBINED N/A 8/20/2014    Procedure: COMBINED ESOPHAGOSCOPY, GASTROSCOPY, DUODENOSCOPY (EGD), BIOPSY SINGLE OR MULTIPLE;  Surgeon: Duane, William Charles, MD;  [facility]     ESOPHAGOSCOPY, GASTROSCOPY, DUODENOSCOPY (EGD), COMBINED N/A 8/22/2014    Procedure: COMBINED ESOPHAGOSCOPY, GASTROSCOPY, DUODENOSCOPY (EGD), BIOPSY SINGLE OR MULTIPLE;  Surgeon: Nabil  Mello RAMOS MD;  [facility]     ESOPHAGOSCOPY, GASTROSCOPY, DUODENOSCOPY (EGD), COMBINED N/A 10/2/2014    Procedure: COMBINED ESOPHAGOSCOPY, GASTROSCOPY, DUODENOSCOPY (EGD), BIOPSY SINGLE OR MULTIPLE;  Surgeon: Remy Haskins MD;  [facility]     ESOPHAGOSCOPY, GASTROSCOPY, DUODENOSCOPY (EGD), COMBINED Left 12/15/2014    Procedure: COMBINED ESOPHAGOSCOPY, GASTROSCOPY, DUODENOSCOPY (EGD), BIOPSY SINGLE OR MULTIPLE;  Surgeon: Remy Haskins MD;  [facility]     ESOPHAGOSCOPY, GASTROSCOPY, DUODENOSCOPY (EGD), COMBINED N/A 2/25/2015    Procedure: COMBINED ENDOSCOPIC ULTRASOUND, ESOPHAGOSCOPY, GASTROSCOPY, DUODENOSCOPY (EGD), FINE NEEDLE ASPIRATE/BIOPSY;  Surgeon: Clemente Lugo MD;  [facility]     ESOPHAGOSCOPY, GASTROSCOPY, DUODENOSCOPY (EGD), COMBINED Left 2/25/2015    Procedure: COMBINED ESOPHAGOSCOPY, GASTROSCOPY, DUODENOSCOPY (EGD), BIOPSY SINGLE OR MULTIPLE;  Surgeon: Clemente Lugo MD;  [facility]     ESOPHAGOSCOPY, GASTROSCOPY, DUODENOSCOPY (EGD), COMBINED N/A 9/25/2016    Procedure: COMBINED ESOPHAGOSCOPY, GASTROSCOPY, DUODENOSCOPY (EGD);  Surgeon: Aziza Patiño MD;  [facility]     ESOPHAGOSCOPY, GASTROSCOPY, DUODENOSCOPY (EGD), COMBINED N/A 1/18/2017    Procedure: COMBINED ESOPHAGOSCOPY, GASTROSCOPY, DUODENOSCOPY (EGD), BIOPSY SINGLE OR MULTIPLE;  Surgeon: Clemente Lopez MD;  [facility]     ESOPHAGOSCOPY, GASTROSCOPY, DUODENOSCOPY (EGD), COMBINED N/A 11/26/2017    Procedure: COMBINED ESOPHAGOSCOPY, GASTROSCOPY, DUODENOSCOPY (EGD), REMOVE FOREIGN BODY;  Esophagogastroduodenoscopy with foreign body extraction  ;  Surgeon: Herberth Castrejon MD;  [facility]     ESOPHAGOSCOPY, GASTROSCOPY, DUODENOSCOPY (EGD), COMBINED N/A 11/26/2017    Procedure: COMBINED ESOPHAGOSCOPY, GASTROSCOPY, DUODENOSCOPY (EGD), REMOVE FOREIGN BODY;;  Surgeon: Herberth Castrejon MD;  [facility]     FASCIOTOMY LOWER EXTREMITY Left 6/10/2016    Procedure: FASCIOTOMY  LOWER EXTREMITY;  Surgeon: Mello Rodriguez MD;  [facility]     HC CAPSULE ENDOSCOPY N/A 8/25/2014    Procedure: CAPSULE/PILL CAM ENDOSCOPY;  Surgeon: Remy Haskins MD;  [facility]     HC CAPSULE ENDOSCOPY N/A 10/2/2014    Procedure: CAPSULE/PILL CAM ENDOSCOPY;  Surgeon: Remy Haskins MD;  [facility]     ORTHOPEDIC SURGERY      broken wrist repair     SEX TRANSFORMATION SURGERY, MALE TO FEMALE      1974     SINUS SURGERY      cyst removed     TONSILLECTOMY       VASCULAR SURGERY      Left carotid stent       Social History   Substance Use Topics     Smoking status: Current Every Day Smoker     Packs/day: 2.50     Years: 30.00     Types: Cigarettes, Cigars     Last attempt to quit: 11/1/2000     Smokeless tobacco: Never Used      Comment: Smoking 3 cig per day with each meal.      Alcohol use No     Family History   Problem Relation Age of Onset     Dementia Mother      Glaucoma Mother      DIABETES Mother      may have been type 1, childhood     Coronary Artery Disease Mother      MI     Glaucoma Father      DIABETES Father      may hev been type 1 - ??     Heart Failure Father      CANCER Maternal Aunt      leukemia     Schizophrenia Brother      Depression Brother      Suicide Sister      Depression Sister      DIABETES Sister      CANCER Maternal Aunt      ovarian     Glaucoma Maternal Grandmother      DIABETES Maternal Grandmother      Glaucoma Maternal Grandfather      DIABETES Maternal Grandfather      Glaucoma Paternal Grandmother      DIABETES Paternal Grandmother      Glaucoma Paternal Grandfather      DIABETES Paternal Grandfather      Breast Cancer Sister      CEREBROVASCULAR DISEASE Brother      Colon Cancer No family hx of          Current Outpatient Prescriptions   Medication Sig Dispense Refill     acetaminophen-codeine (TYLENOL #3) 300-30 MG per tablet Take 1 tablet by mouth every 4 hours as needed for pain maximum 3 tablet(s) per day 20 tablet 0     metFORMIN  (GLUCOPHAGE-XR) 500 MG 24 hr tablet Take 1 tablet (500 mg) by mouth daily (with dinner) 90 tablet 3     brimonidine (ALPHAGAN) 0.2 % ophthalmic solution Place 1 drop into the right eye 3 times daily (Patient taking differently: Place 1 drop into the right eye 2 times daily ) 15 mL 11     dorzolamide-timolol (COSOPT) 2-0.5 % ophthalmic solution Place 1 drop into the right eye 2 times daily 1 Bottle 11     sucralfate (CARAFATE) 1 GM tablet Take 1 tablet (1 g) by mouth 4 times daily May dissolve in 10 mL water is necessary. (Start upon completion of carafate suspension) 120 tablet 11     escitalopram (LEXAPRO) 5 MG tablet Take 2 tablets (10 mg) by mouth daily 60 tablet 5     albuterol (PROAIR HFA/PROVENTIL HFA/VENTOLIN HFA) 108 (90 BASE) MCG/ACT Inhaler Inhale 2 puffs into the lungs every 6 hours as needed for shortness of breath / dyspnea or wheezing 3 Inhaler 1     umeclidinium (INCRUSE ELLIPTA) 62.5 MCG/INH oral inhaler Inhale 1 puff into the lungs daily       lidocaine (LIDODERM) 5 % Patch APPLY 1 TO 3 PATCHES TO PAINFUL AREA AT ONCE FOR UP TO 12 HOURS WITHIN A 24 HOUR PERIOD REMOVE AFTER 12 HOURS 30 patch 5     metoprolol (TOPROL-XL) 25 MG 24 hr tablet TAKE 1 TABLET (25 MG) BY MOUTH DAILY 30 tablet 10     traZODone (DESYREL) 100 MG tablet Take 1.5 tablets (150 mg) by mouth At Bedtime 90 tablet 3     ACETAMINOPHEN PO Take 1,000 mg by mouth every 6 hours as needed for fever Taking q4-6 hours, per MAR       pregabalin (LYRICA) 75 MG capsule Take 2 capsules (150 mg) by mouth 2 times daily 120 capsule 5     cetirizine (ZYRTEC) 10 MG tablet Take 1 tablet (10 mg) by mouth daily as needed for allergies 90 tablet 3     diclofenac (VOLTAREN) 1 % GEL topical gel Apply 2 g topically 4 times daily to hands 100 g 11     lisinopril (PRINIVIL/ZESTRIL) 10 MG tablet Take 1 tablet (10 mg) by mouth daily 90 tablet 3     risperiDONE (RISPERDAL) 0.5 MG tablet Take 0.5 mg by mouth At Bedtime       ferrous sulfate (IRON) 325 (65 FE) MG  tablet Take 1 tablet (325 mg) by mouth 2 times daily With meals 60 tablet 2     albuterol (2.5 MG/3ML) 0.083% neb solution INHALE 1 VIAL VIA NEBULIZER EVERY 6 HOURS AS NEEDED 360 mL 11     Nutritional Supplements (RENÉE) PACK Take 1 packet by mouth 2 times daily       fluticasone (FLONASE) 50 MCG/ACT nasal spray Spray 1 spray into both nostrils daily       ADVAIR DISKUS 250-50 MCG/DOSE diskus inhaler Inhale 1 puff into the lungs 2 times daily        calcium citrate-vitamin D (CITRACAL) 315-250 MG-UNIT TABS Take 2 tablets by mouth daily 120 tablet 5     hydrochlorothiazide (MICROZIDE) 12.5 MG capsule Take 1 capsule (12.5 mg) by mouth daily (Patient taking differently: Take 12.5 mg by mouth daily Hold for sbp<90) 90 capsule 3     estradiol (ESTRACE) 1 MG tablet Take 1 tablet (1 mg) by mouth daily 90 tablet 3     levETIRAcetam (KEPPRA) 500 MG tablet Take 1 tablet (500 mg) by mouth 2 times daily 180 tablet 1     aspirin EC 81 MG EC tablet Take 1 tablet (81 mg) by mouth daily (Patient taking differently: Take 81 mg by mouth every morning ) 90 tablet 3     zinc 50 MG TABS Take 1 tablet by mouth daily       latanoprost (XALATAN) 0.005 % ophthalmic solution Place 1 drop into both eyes At Bedtime 2.5 mL 11     atorvastatin (LIPITOR) 40 MG tablet Take 1 tablet (40 mg) by mouth daily (Patient taking differently: Take 40 mg by mouth At Bedtime ) 90 tablet 3     prochlorperazine (COMPAZINE) 10 MG tablet Take 1 tablet (10 mg) by mouth every 8 hours as needed 20 tablet 0     Multiple Vitamin (MULTIVITAMIN OR) Take 1 tablet by mouth daily        pantoprazole (PROTONIX) 40 MG EC tablet Take 1 tablet (40 mg) by mouth 2 times daily 30 tablet 1     ONDANSETRON PO Take 4 mg by mouth 3 times daily       cyclobenzaprine (FLEXERIL) 10 MG tablet Take 1 tablet (10 mg) by mouth 2 times daily as needed for muscle spasms 30 tablet 1     blood glucose monitoring (ONE TOUCH ULTRASOFT) lancets Use to test blood sugar 3 times daily or as directed.  100 each PRN     blood glucose monitoring (ONE TOUCH ULTRA) test strip Use to test blood sugars 3 times daily or as directed. 3 Box 3     order for DME Full electric hospital bed with half rails    Dx: U84804, I110, J449  Length of need: lifetime 1 Device 0     order for DME Wheel Chair Cushion: 18 x 18 inch Roho cushion 1 Device 0     order for DME Hospital bed with side rails 1 Device 0     polyethylene glycol (MIRALAX/GLYCOLAX) Packet Take 17 g by mouth daily as needed Dissolved in water or juice        EPINEPHrine (EPIPEN JR) 0.15 MG/0.3ML injection Inject 0.3 mLs (0.15 mg) into the muscle as needed for anaphylaxis 0.6 mL 1     order for DME Equipment being ordered: CPAP supplies mask, hose, filters, cushion    fax to [facility] at [phone] 10 Device prn     order for DME Equipment being ordered: CPAP supplies mask, hose, filters, cushion fax to [facility] at [phone]  Disp: 10 Device Refills: prn   Class: Local Print Start: 2/10/2017 1 Device 0     order for DME Equipment being ordered: Nebulizer and tubing supplies 1 Units 0     CYANOCOBALAMIN PO Take 2,000 mcg by mouth daily       blood glucose monitoring (ONE TOUCH ULTRA 2) meter device kit Use to test blood sugars 3 times daily or as directed. 1 kit 0     phenytoin 200 MG CAPS Take 200 mg by mouth 2 times daily (Patient taking differently: Take 200 mg by mouth 2 times daily (takes at 8 AM and 8 PM)) 60 capsule 0     dorzolamide (TRUSOPT) 2 % ophthalmic solution Place 1 drop into both eyes 2 times daily        nitroglycerin (NITROSTAT) 0.4 MG SL tablet Place 1 tablet (0.4 mg) under the tongue every 5 minutes as needed for chest pain if you are still having symptoms after 3 doses (15 minutes) call 911. 25 tablet 1     MEDICATION GIVEN BY INTRATHECAL PUMP - INSTRUCTION continuous May 19, 2017 - per [facility] in [place] [phone]:  Conc: Bupivacaine 20 mg/mL and Fentanyl 2000 mcg/mL.  Continuous: Bupivacaine  7.265 mg/day and Fentanyl 726.5 mcg/day.  Boluses: Up to 7 boluses per 24-hr period of Bupivicaine 0.599 mg and Fentanyl 59.9 mcg    Pump Refill Date at [facility]: 7/2/17       order for DME Equipment being ordered: Glucerna daily shakes with each meal 1 Box 11     ORDER FOR DME Equipment being ordered: Power Wheelchair 1 Device 0     ORDER FOR DME Equipment being ordered: Depends briefs 1 Month 11     Cholecalciferol (VITAMIN D) 2000 UNITS tablet Take 2,000 Units by mouth daily. 100 tablet 3     Allergies   Allergen Reactions     Bee Venom      Penicillins Anaphylaxis     Other reaction(s): Skin Rash and/or Hives     Dilantin [Phenytoin] Other (See Comments)     Generic dilantin only per pt     Iodide Hives     09/11/15: Pt states she can use iodine and doesn't have any problems      Iodine-131      Novocaine [Procaine] Hives     Other reaction(s): Skin Rash and/or Hives     Tositumomab      Recent Labs   Lab Test  12/06/17   0832  12/05/17   1950   10/08/17   1744  08/24/17   0955   01/24/17   1156   11/04/16   1145   06/06/16   0648   09/15/15   0749   07/22/14   1209   A1C   --    --    --    --   4.5   --   4.1*   --    --    --   4.9   --    --    < >   --    LDL   --    --    --    --    --    --    --    --    --    --   77   --   71   --   104   HDL   --    --    --    --    --    --    --    --    --    --   65   --   49*   --   50*   TRIG   --    --    --    --    --    --    --    --    --    --   62   --   62   --   124   ALT  45  43   --   45   --    < >  35   --    --    < >   --    < >  58*   < >  51*   CR  0.43*  0.46*   < >  0.63   --    < >  0.45*   < >  0.60   < >   --    < >  0.60   < >  0.67   GFRESTIMATED  >90  >90   < >  >90   --    < >  >90  Non  GFR Calc     < >  >90  Non  GFR Calc     < >   --    < >  >90  Non  GFR Calc     < >  88   GFRESTBLACK  >90  >90   < >  >90   --    < >  >90   GFR Calc     < >  >90    American GFR Calc     < >   --    < >  >90   GFR Calc     < >  >90   POTASSIUM  3.6  3.3*   < >  3.8   --    < >  3.9   < >  4.1   < >   --    < >  3.8   < >  3.6   TSH   --    --    --    --    --    --    --    --   0.84   --    --    --    --    --   1.68    < > = values in this interval not displayed.      BP Readings from Last 3 Encounters:   12/06/17 132/68   11/27/17 144/65   11/10/17 144/84    Wt Readings from Last 3 Encounters:   12/06/17 137 lb (62.1 kg)   11/06/17 133 lb (60.3 kg)   10/23/17 135 lb (61.2 kg)            Reviewed and updated as needed this visit by clinical staff     Reviewed and updated as needed this visit by Provider         ROS:  C: NEGATIVE for fever, chills, change in weight, noted + fatigue.  E/M: NEGATIVE for ear, mouth and throat problems  R: NEGATIVE for significant cough or SOB  CV: NEGATIVE for chest pain, palpitations or peripheral edema  GI: NEGATIVE for nausea, abdominal pain, heartburn  : NEGATIVE for frequency, dysuria, or hematuria  Noted + headaches.  H: NEGATIVE for bleeding problems  P: NEGATIVE for changes in mood or affect    OBJECTIVE:                                                    /70  Pulse 76  Temp 98.6  F (37  C) (Oral)  Wt 137 lb (62.1 kg)  SpO2 96%  BMI 21.46 kg/m2  Body mass index is 21.46 kg/(m^2).  GENERAL: alert in wheelchair, c/o headaches chronically  EYES: Eyes grossly normal to inspection, extraocular movements - intact, and PERRL  HENT: ear canals- normal; TMs- normal; Nose- normal; Mouth- no ulcers, no lesions  NECK: no tenderness, no adenopathy, no asymmetry, no masses, no stiffness; thyroid- normal to palpation  RESP: lungs clear to auscultation - no rales, no rhonchi, no wheezes  CV: regular rates and rhythm, normal S1 S2, no S3 or S4 and no click or rub -  ABDOMEN: soft, no tenderness, no  hepatosplenomegaly, no masses, normal bowel sounds  MS: IN WHEELCHAIR, BILATERAL BKA'S  No focal neurological changes  PSYCH:  Alert and oriented times 3; speech- coherent , normal rate and volume; able to articulate logical thoughts, able to abstract reason, no tangential thoughts, no hallucinations or delusions, affect- normal       CT HEAD W/O CONTRAST 12/5/2017 10:57 PM     History: headache;      Comparison: CT 4/12/2017.     Technique: Using multidetector thin collimation helical acquisition  technique, axial, coronal and sagittal CT images from the skull base  to the vertex were obtained without intravenous contrast.      Findings:    There is no evidence of intracranial hemorrhage, mass effect, midline  shift, or abnormal extraaxial fluid collection. Unchanged age-related  calcifications of basal ganglia. The ventricles and sulci are within  normal limits for age. Mild patchy periventricular white matter  hypodensities most consistent with chronic small vessel ischemic  disease. Gray-white differentiation is intact throughout both cerebral  hemispheres.  The bony calvaria and the bones of the skull base appear  normal.  The visualized mastoid air cells and paranasal sinuses are  clear.          Impression:   1. No acute intracranial pathology  2. Age-related bilateral basal ganglia calcifications are unchanged.    ASSESSMENT/PLAN:                                                      (Z09) Hospital discharge follow-up  (primary encounter diagnosis)  Comment: appears slowly better, no acute changes less ongoing issues with chronic headaches  Plan:     (N39.0) Urinary tract infection without hematuria, site unspecified  Comment: resolved on therapy  Plan:     (G44.209) Tension headache  Comment: discussed with patient and states Tylenol with codeine as worked well for these in past when acute, noted recent negative CT scan head.  Plan: acetaminophen-codeine (TYLENOL #3) 300-30 MG         per tablet, limit #20 given.  Discussed in depth issues of use with her pain pump and she states that she has control over her bolus therapy doses.  I  discussed risks with pain meds and advised her that if she does use T#3 for headache to limit her prn bolus dosing when used.  She agrees and we discussed all risks regarding chronic pain med use.    (G89.4) Chronic pain syndrome  Comment: discussed as above noted dosing risks.  Plan:       See Patient Instructions    Allan Casey MD  [facility]    THE MEDICATION LIST HAS BEEN FULLY RECONCILED BY THE [facility] AND THE NURSING STAFF.

## 2017-12-12 NOTE — MR AVS SNAPSHOT
After Visit Summary   12/12/2017    Sonya Foote    MRN: [id number]           Patient Information     Date Of Birth          1950        Visit Information        Provider Department      12/12/2017 8:00 AM Allan Casey MD [facility]        Today's Diagnoses     Hospital discharge follow-up    -  1    Urinary tract infection without hematuria, site unspecified        Tension headache        Chronic pain syndrome           Follow-ups after your visit        Follow-up notes from your care team     Return if symptoms worsen or fail to improve.      Your next 10 appointments already scheduled     Dec 29, 2017 10:00 AM CST   (Arrive by 9:45 AM)   New Patient Visit with VICTOR M Floyd CNP   [facility] ([facility])    [address]  4th Floor  [facility] [address]   [phone]            Feb 19, 2018  9:30 AM CST   (Arrive by 9:15 AM)   Return Visit with Alina Jennings MD   [facility] ([facility])    [address]  3rd Floor  [facility] [address]   [phone]            Mar 06, 2018  9:15 AM CST   VISUAL FIELD with [facility] EYE VISUAL FIELD   Eye Clinic ([facility])    [address]  [address]  [address]  [facility] [address]   [phone]            Mar 06, 2018  9:45 AM CST   RETURN GLAUCOMA with Marely Robin MD   Eye Clinic ([facility])    [address]  [address]  [address]  [facility] [address]   [phone]            May 11, 2018 10:50 AM CDT   (Arrive by 10:35 AM)   RETURN DIABETES with Michelle Irizarry MD   [facility] ([facility])    [address]  [address] [facility] [id number] [phone]              Who to contact     If you have questions or need follow up information about  "today's clinic visit or your schedule please contact [facility] directly at [phone].  Normal or non-critical lab and imaging results will be communicated to you by MyChart, letter or phone within 4 business days after the clinic has received the results. If you do not hear from us within 7 days, please contact the clinic through miDrivehart or phone. If you have a critical or abnormal lab result, we will notify you by phone as soon as possible.  Submit refill requests through [employer] or call your pharmacy and they will forward the refill request to us. Please allow 3 business days for your refill to be completed.          Additional Information About Your Visit        miDriveharPT PAL Information     [employer] lets you send messages to your doctor, view your test results, renew your prescriptions, schedule appointments and more. To sign up, go to [email or web address] . Click on \"Log in\" on the left side of the screen, which will take you to the Welcome page. Then click on \"Sign up Now\" on the right side of the page.     You will be asked to enter the access code listed below, as well as some personal information. Please follow the directions to create your username and password.     Your access code is: [email or web address]  Expires: 2018 11:09 AM     Your access code will  in 90 days. If you need help or a new code, please call your [place] clinic or [phone].        Care EveryWhere ID     This is your Care EveryWhere ID. This could be used by other organizations to access your [facility] medical records  [id number]        Your Vitals Were     Pulse Temperature Pulse Oximetry BMI (Body Mass Index)          76 98.6  F (37  C) (Oral) 96% 21.46 kg/m2         Blood Pressure from Last 3 Encounters:   17 128/70   17 132/68   17 144/65    Weight from Last 3 Encounters:   17 137 lb (62.1 kg)   17 137 lb (62.1 kg)   17 133 lb (60.3 kg)              We Performed " the Following     DEPRESSION ACTION PLAN (DAP)          Today's Medication Changes          These changes are accurate as of: 12/12/17  8:27 AM.  If you have any questions, ask your nurse or doctor.               Start taking these medicines.        Dose/Directions    acetaminophen-codeine 300-30 MG per tablet   Commonly known as:  TYLENOL #3   Used for:  Tension headache   Started by:  Allan Casey MD        Dose:  1 tablet   Take 1 tablet by mouth every 4 hours as needed for pain maximum 3 tablet(s) per day   Quantity:  20 tablet   Refills:  0         These medicines have changed or have updated prescriptions.        Dose/Directions    aspirin 81 MG EC tablet   This may have changed:  when to take this   Used for:  Unstable angina (H)        Dose:  81 mg   Take 1 tablet (81 mg) by mouth daily   Quantity:  90 tablet   Refills:  3       atorvastatin 40 MG tablet   Commonly known as:  LIPITOR   This may have changed:  when to take this   Used for:  Hyperlipidemia LDL goal <100        Dose:  40 mg   Take 1 tablet (40 mg) by mouth daily   Quantity:  90 tablet   Refills:  3       brimonidine 0.2 % ophthalmic solution   Commonly known as:  ALPHAGAN   This may have changed:  when to take this   Used for:  Primary open angle glaucoma of both eyes, severe stage        Dose:  1 drop   Place 1 drop into the right eye 3 times daily   Quantity:  15 mL   Refills:  11       hydrochlorothiazide 12.5 MG capsule   Commonly known as:  MICROZIDE   This may have changed:  additional instructions   Used for:  Essential hypertension with goal blood pressure less than 130/80        Dose:  12.5 mg   Take 1 capsule (12.5 mg) by mouth daily   Quantity:  90 capsule   Refills:  3       Phenytoin Sodium Extended 200 MG Caps   This may have changed:  additional instructions   Used for:  Seizure disorder (H)        Dose:  200 mg   Take 200 mg by mouth 2 times daily   Quantity:  60 capsule   Refills:  0            Where to get your medicines       Some of these will need a paper prescription and others can be bought over the counter.  Ask your nurse if you have questions.     Bring a paper prescription for each of these medications     acetaminophen-codeine 300-30 MG per tablet                Primary Care Provider Office Phone # Fax #    Allan Casey [phone]       [address] [facility] [address]        Equal Access to Services     KARI CARREON : Hadii aad ku hadasho Soomaali, waaxda luqadaha, qaybta kaalmada adeegyada, waxay idiin hayaan adeeg kharash la'aan ah. So [facility] [phone].    ATENCIÓN: Si habla espsue, tiene a briones disposición servicios gratuitos de asistencia lingüística. Llame al [phone].    We comply with applicable federal civil rights laws and [place] laws. We do not discriminate on the basis of race, color, national origin, age, disability, sex, sexual orientation, or gender identity.            Thank you!     Thank you for choosing [facility]  for your care. Our goal is always to provide you with excellent care. Hearing back from our patients is one way we can continue to improve our services. Please take a few minutes to complete the written survey that you may receive in the mail after your visit with us. Thank you!             Your Updated Medication List - Protect others around you: Learn how to safely use, store and throw away your medicines at www.disposemymeds.org.          This list is accurate as of: 12/12/17  8:27 AM.  Always use your most recent med list.                   Brand Name Dispense Instructions for use Diagnosis    ACETAMINOPHEN PO      Take 1,000 mg by mouth every 6 hours as needed for fever Taking q4-6 hours, per MAR        acetaminophen-codeine 300-30 MG per tablet    TYLENOL #3    20 tablet    Take 1 tablet by mouth every 4 hours as needed for pain maximum 3 tablet(s) per day    Tension headache       ADVAIR DISKUS 250-50 MCG/DOSE diskus inhaler    Generic drug:  fluticasone-salmeterol      Inhale 1 puff into the lungs 2 times daily        * albuterol (2.5 MG/3ML) 0.083% neb solution     360 mL    INHALE 1 VIAL VIA NEBULIZER EVERY 6 HOURS AS NEEDED    Chronic obstructive pulmonary disease, unspecified COPD type (H)       * albuterol 108 (90 BASE) MCG/ACT Inhaler    PROAIR HFA/PROVENTIL HFA/VENTOLIN HFA    3 Inhaler    Inhale 2 puffs into the lungs every 6 hours as needed for shortness of breath / dyspnea or wheezing    JOSE (obstructive sleep apnea)       aspirin 81 MG EC tablet     90 tablet    Take 1 tablet (81 mg) by mouth daily    Unstable angina (H)       atorvastatin 40 MG tablet    LIPITOR    90 tablet    Take 1 tablet (40 mg) by mouth daily    Hyperlipidemia LDL goal <100       blood glucose monitoring lancets     100 each    Use to test blood sugar 3 times daily or as directed.    Type 2 diabetes mellitus with diabetic peripheral angiopathy without gangrene, without long-term current use of insulin (H)       blood glucose monitoring meter device kit     1 kit    Use to test blood sugars 3 times daily or as directed.    Type 2 diabetes, HbA1C goal < 8% (H)       blood glucose monitoring test strip    ONETOUCH ULTRA    3 Box    Use to test blood sugars 3 times daily or as directed.    Type 2 diabetes mellitus with diabetic peripheral angiopathy without gangrene, without long-term current use of insulin (H)       brimonidine 0.2 % ophthalmic solution    ALPHAGAN    15 mL    Place 1 drop into the right eye 3 times daily    Primary open angle glaucoma of both eyes, severe stage       calcium citrate-vitamin D 315-250 MG-UNIT Tabs per tablet    CITRACAL    120 tablet    Take 2 tablets by mouth daily    Osteoporosis       cetirizine 10 MG tablet    zyrTEC    90 tablet    Take 1 tablet (10 mg) by mouth daily as needed for allergies    Seasonal allergic rhinitis, unspecified allergic rhinitis trigger       CYANOCOBALAMIN PO      Take 2,000 mcg by mouth daily         cyclobenzaprine 10 MG tablet    FLEXERIL    30 tablet    Take 1 tablet (10 mg) by mouth 2 times daily as needed for muscle spasms    Cervicalgia       diclofenac 1 % Gel topical gel    VOLTAREN    100 g    Apply 2 g topically 4 times daily to hands    Primary osteoarthritis of both hands       dorzolamide 2 % ophthalmic solution    TRUSOPT     Place 1 drop into both eyes 2 times daily        dorzolamide-timolol 2-0.5 % ophthalmic solution    COSOPT    1 Bottle    Place 1 drop into the right eye 2 times daily    Primary open angle glaucoma of both eyes, severe stage       EPINEPHrine 0.15 MG/0.3ML injection 2-pack    EPIPEN JR    0.6 mL    Inject 0.3 mLs (0.15 mg) into the muscle as needed for anaphylaxis    Bee sting reaction, undetermined intent, subsequent encounter       escitalopram 5 MG tablet    LEXAPRO    60 tablet    Take 2 tablets (10 mg) by mouth daily    Adjustment disorder with depressed mood       estradiol 1 MG tablet    ESTRACE    90 tablet    Take 1 tablet (1 mg) by mouth daily    Person who has had sex change operation       ferrous sulfate 325 (65 FE) MG tablet    IRON    60 tablet    Take 1 tablet (325 mg) by mouth 2 times daily With meals        fluticasone 50 MCG/ACT spray    FLONASE     Spray 1 spray into both nostrils daily        hydrochlorothiazide 12.5 MG capsule    MICROZIDE    90 capsule    Take 1 capsule (12.5 mg) by mouth daily    Essential hypertension with goal blood pressure less than 130/80       INCRUSE ELLIPTA 62.5 MCG/INH oral inhaler   Generic drug:  umeclidinium      Inhale 1 puff into the lungs daily        RENÉE Pack      Take 1 packet by mouth 2 times daily        latanoprost 0.005 % ophthalmic solution    XALATAN    2.5 mL    Place 1 drop into both eyes At Bedtime    Primary open angle glaucoma, stage unspecified       levETIRAcetam 500 MG tablet    KEPPRA    180 tablet    Take 1 tablet (500 mg) by mouth 2 times daily    Nausea       lidocaine 5 % Patch    LIDODERM     30 patch    APPLY 1 TO 3 PATCHES TO PAINFUL AREA AT ONCE FOR UP TO 12 HOURS WITHIN A 24 HOUR PERIOD REMOVE AFTER 12 HOURS    Chronic pain syndrome, 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 (H)       lisinopril 10 MG tablet    PRINIVIL/ZESTRIL    90 tablet    Take 1 tablet (10 mg) by mouth daily    Essential hypertension with goal blood pressure less than 130/80       MEDICATION GIVEN BY INTRATHECAL PUMP - INSTRUCTION      continuous May 19, 2017 - per [facility] [place] [phone]: Conc: Bupivacaine 20 mg/mL and Fentanyl 2000 mcg/mL. Continuous: Bupivacaine 7.265 mg/day and Fentanyl 726.5 mcg/day. Boluses: Up to 7 boluses per 24-hr period of Bupivicaine 0.599 mg and Fentanyl 59.9 mcg  Pump Refill Date at [facility]: 7/2/17        metFORMIN 500 MG 24 hr tablet    GLUCOPHAGE-XR    90 tablet    Take 1 tablet (500 mg) by mouth daily (with dinner)    Type 2 diabetes mellitus with diabetic peripheral angiopathy and gangrene, without long-term current use of insulin (H)       metoprolol 25 MG 24 hr tablet    TOPROL-XL    30 tablet    TAKE 1 TABLET (25 MG) BY MOUTH DAILY    Old myocardial infarction       MULTIVITAMIN PO      Take 1 tablet by mouth daily        nitroGLYcerin 0.4 MG sublingual tablet    NITROSTAT    25 tablet    Place 1 tablet (0.4 mg) under the tongue every 5 minutes as needed for chest pain if you are still having symptoms after 3 doses (15 minutes) call 911.    Chronic systolic congestive heart failure (H), Old myocardial infarction       ONDANSETRON PO      Take 4 mg by mouth 3 times daily        * order for DME     1 Month    Equipment being ordered: Depends briefs    Incontinence       * order for DME     1 Device    Equipment being ordered: Power Wheelchair    CVA (cerebral infarction), HTN (hypertension)       * order for DME     1 Box    Equipment being ordered: Glucerna daily shakes with each meal    Type 2 diabetes mellitus with other  diabetic neurological complication       * order for DME     1 Units    Equipment being ordered: Nebulizer and tubing supplies    Simple chronic bronchitis (H)       * order for DME     1 Device    Equipment being ordered: CPAP supplies mask, hose, filters, cushion fax to [facility] at [phone] Disp: 10 DeviceRefills: prn Class: Local PrintStart: 2/10/2017    Chronic obstructive pulmonary disease, unspecified COPD type (H)       * order for DME     10 Device    Equipment being ordered: CPAP supplies mask, hose, filters, cushion  fax to [facility] at [phone]    COPD (chronic obstructive pulmonary disease) (H)       * order for DME     1 Device    Hospital bed with side rails    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 (H)       * order for DME     1 Device    Full electric hospital bed with half rails  Dx: E96818, I110, J449 Length of need: lifetime    Status post bilateral above knee amputation (H)       * order for DME     1 Device    Wheel Chair Cushion: 18 x 18 inch Roho cushion    Status post bilateral above knee amputation (H)       pantoprazole 40 MG EC tablet    PROTONIX    30 tablet    Take 1 tablet (40 mg) by mouth 2 times daily    Gastroesophageal reflux disease without esophagitis       Phenytoin Sodium Extended 200 MG Caps     60 capsule    Take 200 mg by mouth 2 times daily    Seizure disorder (H)       polyethylene glycol Packet    MIRALAX/GLYCOLAX     Take 17 g by mouth daily as needed Dissolved in water or juice        pregabalin 75 MG capsule    LYRICA    120 capsule    Take 2 capsules (150 mg) by mouth 2 times daily    Pain in both upper extremities       prochlorperazine 10 MG tablet    COMPAZINE    20 tablet    Take 1 tablet (10 mg) by mouth every 8 hours as needed    Nausea with vomiting       risperiDONE 0.5 MG tablet    risperDAL     Take 0.5 mg by mouth At Bedtime        sucralfate 1 GM tablet    CARAFATE    120 tablet    Take 1 tablet (1 g) by mouth 4 times  daily May dissolve in 10 mL water is necessary. (Start upon completion of carafate suspension)    Adjustment disorder with depressed mood       traZODone 100 MG tablet    DESYREL    90 tablet    Take 1.5 tablets (150 mg) by mouth At Bedtime    Anxiety state       vitamin D 2000 UNITS tablet     100 tablet    Take 2,000 Units by mouth daily.        zinc 50 MG Tabs      Take 1 tablet by mouth daily        * Notice:  This list has 11 medication(s) that are the same as other medications prescribed for you. Read the directions carefully, and ask your doctor or other care provider to review them with you.

## 2017-12-12 NOTE — LETTER
My Depression Action Plan  Name: Sonya Foote   Date of Birth 1950  Date: 12/12/2017    My doctor: Allan Casey   My clinic: [address] [address] [phone]          GREEN    ZONE   Good Control    What it looks like:     Things are going generally well. You have normal up s and down s. You may even feel depressed from time to time, but bad moods usually last less than a day.   What you need to do:  1. Continue to care for yourself (see self care plan)  2. Check your depression survival kit and update it as needed  3. Follow your physician s recommendations including any medication.  4. Do not stop taking medication unless you consult with your physician first.           YELLOW         ZONE Getting Worse    What it looks like:     Depression is starting to interfere with your life.     It may be hard to get out of bed; you may be starting to isolate yourself from others.    Symptoms of depression are starting to last most all day and this has happened for several days.     You may have suicidal thoughts but they are not constant.   What you need to do:     1. Call your care team, your response to treatment will improve if you keep your care team informed of your progress. Yellow periods are signs an adjustment may need to be made.     2. Continue your self-care, even if you have to fake it!    3. Talk to someone in your support network    4. Open up your depression survival kit           RED    ZONE Medical Alert - Get Help    What it looks like:     Depression is seriously interfering with your life.     You may experience these or other symptoms: You can t get out of bed most days, can t work or engage in other necessary activities, you have trouble taking care of basic hygiene, or basic responsibilities, thoughts of suicide or death that will not go away, self-injurious behavior.     What you need to do:  1. Call your care team  and request a same-day appointment. If they are not available (weekends or after hours) call your local crisis line, emergency room or 911.      Electronically signed by: Daniela Hancock, December 12, 2017    Depression Self Care Plan / Survival Kit    Self-Care for Depression  Here s the deal. Your body and mind are really not as separate as most people think.  What you do and think affects how you feel and how you feel influences what you do and think. This means if you do things that people who feel good do, it will help you feel better.  Sometimes this is all it takes.  There is also a place for medication and therapy depending on how severe your depression is, so be sure to consult with your medical provider and/ or Behavioral Health Consultant if your symptoms are worsening or not improving.     In order to better manage my stress, I will:    Exercise  Get some form of exercise, every day. This will help reduce pain and release endorphins, the  feel good  chemicals in your brain. This is almost as good as taking antidepressants!  This is not the same as joining a gym and then never going! (they count on that by the way ) It can be as simple as just going for a walk or doing some gardening, anything that will get you moving.      Hygiene   Maintain good hygiene (Get out of bed in the morning, Make your bed, Brush your teeth, Take a shower, and Get dressed like you were going to work, even if you are unemployed).  If your clothes don't fit try to get ones that do.    Diet  I will strive to eat foods that are good for me, drink plenty of water, and avoid excessive sugar, caffeine, alcohol, and other mood-altering substances.  Some foods that are helpful in depression are: complex carbohydrates, B vitamins, flaxseed, fish or fish oil, fresh fruits and vegetables.    Psychotherapy  I agree to participate in Individual Therapy (if recommended).    Medication  If prescribed medications, I agree to take them.   Missing doses can result in serious side effects.  I understand that drinking alcohol, or other illicit drug use, may cause potential side effects.  I will not stop my medication abruptly without first discussing it with my provider.    Staying Connected With Others  I will stay in touch with my friends, family members, and my primary care provider/team.    Use your imagination  Be creative.  We all have a creative side; it doesn t matter if it s oil painting, sand castles, or mud pies! This will also kick up the endorphins.    Witness Beauty  (AKA stop and smell the roses) Take a look outside, even in mid-winter. Notice colors, textures. Watch the squirrels and birds.     Service to others  Be of service to others.  There is always someone else in need.  By helping others we can  get out of ourselves  and remember the really important things.  This also provides opportunities for practicing all the other parts of the program.    Humor  Laugh and be silly!  Adjust your TV habits for less news and crime-drama and more comedy.    Control your stress  Try breathing deep, massage therapy, biofeedback, and meditation. Find time to relax each day.     My support system    Clinic Contact:  Phone number:    Contact 1:  Phone number:    Contact 2:  Phone number:    Muslim/:  Phone number:    Therapist:  Phone number:    Local crisis center:    Phone number:    Other community support:  Phone number:

## 2017-12-13 ENCOUNTER — DOCUMENTATION ONLY (OUTPATIENT)
Dept: CARE COORDINATION | Facility: CLINIC | Age: 67
End: 2017-12-13

## 2017-12-13 NOTE — PROGRESS NOTES
[facility] now requests orders and shares plan of care/discharge summaries for some patients through [employer].  Please REPLY TO THIS MESSAGE in order to give authorization for orders when needed.  This is considered a verbal order, you will still receive a faxed copy of orders for signature.  Thank you for your assistance in improving collaboration for our patients.    ORDER    1 day x1 for recertification visit, OT 1 day x1    MD SUMMARY/PLAN OF CARE    Continuing pt cares for c/o chronic complications of UTIs and awaiting cares of suprapubic catheter placement following resolution of current UTI/ infection concerns. OT continues to work with pt regarding safe transfers and need of adaptive equipment in home.

## 2017-12-18 NOTE — PROGRESS NOTES
12/18/17: ELVIRA spoke with Shelley at [facility] - ordered weighted spoon and fork, cup, and cup arias.  Quote received - CPS and POC updated and sent to CMS for auth.       Elvira spoke with EDWARDO Richmond in Urology dept at [phone] re: suprapubic catheter.  Yi had spoke with member last Thurs and is currently working with dr and scheduling.  She will f/u with member.     Jamie Sharma RN, PHN  [facility]

## 2017-12-18 NOTE — PROGRESS NOTES
[facility] Home Visit Assessment     Home visit for Health Risk Assessment with Sonya Foote completed on December 12, 2017  Member resides in Assisted living  [facility]  and lives alone   Present at assessment: member, this care coordinator and EDWARDO Dumont at AL.     Current Care Plan  Member currently receiving the following services: Assisted Living, RN, PT/OT, Supplies - monthly Incontinence supplies, Glucerna, EW transportation - metro mobility.      Medication Review  Medication reconciliation completed in Epic:No - PCP had just completed earlier on 12/12 at PCP visit.  CM reviewed meds with member.   Medication set-up & administration: RN sets up  daily.  Assisting Living staff administers medications.  Medication understanding/adherence (by member, family or CC): Member has questions about his or her medications:  No     Mental/Behavioral Health   Depression Screening: See PHQ assessment flowsheet.   Mental Health Diagnosis: Yes: Anxiety, Depression, Schizoeffective disorder. If yes, how managed?  [facility].   Member sees [facility] worker weekly - denies any need for further counseling.   Member's spouse passed away 6/2017 -member is having a difficult time and misses her spouse - she is working with [facility] worker.  Discussed additional counseling assistance - member agrees need to not isolate and join more activities when feeling healthy.   No current MH services-will place referral for N/A    Falls in last 12 months: Yes:  If yes, was an injury sustained? No    ADL/IADL Dependencies: Bathing, Dressing, Grooming, Transfers, Toileting, Wheelchair, Cleaning, Cooking, Laundry, Shopping, Meal prep, Medication Management, Money Management and Transportation     [facility] Plan sponsored benefits: Shared information re: Silver Sneakers/gym memberships, ASA, Calcium +D.    PCA Assessment completed at visit: No   If yes, will process assessment and communicate results to member within 10  days.  Elderly Waiver Eligibility: Yes-will continue on EW    Member requested weighted utensils and cup.  CM also spoke OT, Fátima through [facility] - she requested cup arias for chair.  CM to order from [email or web address] [facility].     Member has current HCD however would like to discuss as would like to update.  CM to make referral for review of HCD with member.     Care Plan & Recommendations: 1. CL tool, 2. RN visits, 3. PT/OT, 4. Monthly incontinence supplies and Glucerna, 5. [employer] Mob[employer] passes, 6. CM to order equip through [facility] - weighted fork and spoon, weighted cup, cup arias.     See LTCC for detailed assessment information.    Follow-Up Plan: Member informed of future contact, plan to f/u with member with a 6 month telephone assessment.  Contact information shared with member and family, encouraged member to call with any questions or concerns at any time.  [place] care continuum providers: Please refer to Health Care Home on the Epic Problem List to view this patient's [facility] Care Plan Summary.    Jamie Sharma RN, PHN  [facility]

## 2017-12-18 NOTE — PROGRESS NOTES
12/14/17: CM received call from member upset that her urology appt today had been canceled - it was supposed to be the f/u post surgery and therefore was canceled however member did not realize.  She states that she spoke with nurse at Urology clinic regarding surgery - she states she gave nurse CM phone #.  Member is anxious to have surgery  - will need to determine after stay as outpatient procedure however needs someone with member for 24 hours and member states she does not have anyone.  Discussed with EDWARDO Dumont at [facility] - they are able to do checks however need to determine if sufficient or need someone at bedside for 24 hours.   Julia suggested visiting [facility] as they have provided this care for members in past.  CM placed call - they are private pay only.  CM left vm for RN with [facility], to discuss HHA options.    CM will review with Supervisor.     Jamie Sharma, RN, N  [facility]

## 2017-12-18 NOTE — PROGRESS NOTES
12/15/17: CM received call from ULISES Shine with [facility] - she was at member's home.   Discussed the weighted spoon, fork, and cup.  She also requested cup arias for member's power chair.  CM to order.  Fátima also states that member's roho cushion has holes beyond repair and is requesting new roho cushion.  Member received in 7/2017  - CM will f/u with [facility] where purchased.     CM placed call to June - spoke with CR Rep - she states that typically not damaged this soon however she states that she will tx CM to rehab dept to discuss options.   CM had to leave .   ULISES Shine states she will also discuss with member on care of the cushion.     Jamie Sharma RN, N  [facility]

## 2017-12-19 ENCOUNTER — CARE COORDINATION (OUTPATIENT)
Dept: GERIATRIC MEDICINE | Facility: CLINIC | Age: 67
End: 2017-12-19

## 2017-12-19 ENCOUNTER — CARE COORDINATION (OUTPATIENT)
Dept: UROLOGY | Facility: CLINIC | Age: 67
End: 2017-12-19

## 2017-12-19 NOTE — PROGRESS NOTES
Patient's  notified of new surgery date and pac appointment.  Dr Dawson to perform her surgery 1/16/18 at 1 pm at [facility]   Patient has no one to stay with her overnight- she will stay in the observation area  Will mail out packet.    Yi Mathews, RN   Care Coordinator Urology

## 2017-12-19 NOTE — PROGRESS NOTES
Received after visit chart from care coordinator.  Completed following tasks: Mailed copy of care plan to client, Updated services in access and Submitted referrals/auths for DME items; [facility]  MMIS not entered due to U code being on - once U code is removed I will enter MMIS and upload tool  Chart was returned to CC.     Mailed copy of CL tool to client, faxed copy to AL facility, and submitted authorization to health plan.    Irene Lemosatoyoselyn  Case Management Specialist   [facility]   [phone]

## 2017-12-19 NOTE — PROGRESS NOTES
12/19/17: CM placed call to [facility] - rehab dept - left  re: roho cushion.     Jamie Sharma RN, N  [facility]

## 2017-12-20 NOTE — PROGRESS NOTES
12/20/17: CM received call back from member - relayed surgery details and appt time/dates to her.  She understands and will be getting packet in mail.      Member reports that she will be getting her cell phone back on Friday - her son is taking her to [employer].  Reminded member about importance of having phone.  Cm did provide member with free phone through [facility] worker assisted member in completing application.     Member states Prashanth [facility] worker has visit on Friday at 10 however she will be getting phone so asked CM to contact Prashanth to change appt to Friday at 2 or Thurs.  Cm placed call to [facility]  - provided Prashanth contact # at [phone].  CM placed call and left vm for Prashanth.     Discussed HCD with member - she states she does want to update to DNR/DNI and update health care agents  - discussed with Meredith Perry - will schedule appt for HCD in Jan prior to surgery.  Member agrees.     Jamie Sharma RN, PHN  [facility]

## 2017-12-20 NOTE — PROGRESS NOTES
12/19/17: Cm received call from Yi with Urology dept stating that she gotten surgery arranged - surgery is scheduled for 1/16/18 at 1 p.m. With Dr. Keanu Dawson.  Arrival at 11 a.m  Surgery at .    Pre-op with Anesthesia will be 1/4/17 @ 1 p.m.   Yi will send out packet of information to member.   They will keep her under observation.    CM will relay info to member.   Cm placed call to member - member still does not have phone. Called [facility] - they will relay message.     Received call back from member but CM was in a visit and could not relay information.  Member will call CM back later today or tomorrow morning.     Jamie Sharma RN, PHN  [facility]

## 2017-12-20 NOTE — PROGRESS NOTES
12/20/17: MMIS not able to be entered per CMS as U code.  CM placed call to [facility] - spoke with ram - he states member's MA is currently being renewed.  He reviewed and removed U code.   He states that they are still needing documentation to renew MA - current statement from [employer].  This needs to be turned in by end of month.   CM placed call to [facility] - left message for member to call CM.     CM received call back from [facility] worker - she will contact member at [facility] to reschedule appt this week. CM shared information re: bank statement for MA renewal - she states she will let member know as well as assist her in submitting timely.      Member contacted CM asking if CM had spoken with [facility] worker - explained that CM had just spoken with her and she will be contacting member.  Also informed member of bank statement needed.  She states she has and will get submitted.     Jamie Sharma RN, PHN  [facility]

## 2017-12-20 NOTE — PROGRESS NOTES
U code removed, MMIS entered.  Tool uploaded to [employer]its    Irene Digatoyoselyn  Case Management Specialist   [facility]   [phone]

## 2017-12-21 NOTE — PROGRESS NOTES
12/21/17: CM received call from ULISES Shine regarding roho cushion - she is at member's home.  Explained that CM has left messages - no call back.  Will call again today to speak with manager.   Fátima states that there are holes all over  - she tried to pump up.      CM placed call to [facility] - asked to speak with supervisor - supervisor at lunch - will have them call CM back.     CM received call back from Maria Eugenia at [facility] - she states that roho cushions need to go through roho company - [facility] provided CM with contact # [phone].   CM placed call to [employer] - need serial #.  Attempted to call member at Al - unable to reach.    CM placed call back to Maria Eugenia at [facility] at [phone] - ktwd vm requesting serial #.  CM sent email to [facility] with update.     Jamie Sharma RN, PHN  [facility]

## 2017-12-22 ENCOUNTER — DOCUMENTATION ONLY (OUTPATIENT)
Dept: CARE COORDINATION | Facility: CLINIC | Age: 67
End: 2017-12-22

## 2017-12-22 NOTE — PROGRESS NOTES
[facility] now requests orders and shares plan of care/discharge summaries for some patients through [employer].  Please REPLY TO THIS MESSAGE in order to give authorization for orders when needed.  This is considered a verbal order, you will still receive a faxed copy of orders for signature.  Thank you for your assistance in improving collaboration for our patients.    ORDERS  Nursing 1x/month for 1 month, 4x/month for 1 month, 5PRN visits

## 2017-12-27 ENCOUNTER — TELEPHONE (OUTPATIENT)
Dept: GASTROENTEROLOGY | Facility: CLINIC | Age: 67
End: 2017-12-27

## 2017-12-27 NOTE — PROGRESS NOTES
12/27/17: ELVIRA received call from Howard at [facility] - he was calling CM back about [employer] - explained that CM had spoke with Maria Eugenia last week. Howard states he can contact [employer] for CM - Cm provided him with serial #.  He will contact and get back to ELVIRA.     Jamie Sharma RN, N  [facility]

## 2017-12-27 NOTE — PROGRESS NOTES
12/27/17: ELVIRA spoke with member - she now has a working cell phone.   Serial # for roseline irizarry is [id number].  Elvira will contact [employer].    Jamie Sharma RN, N  [facility]

## 2017-12-27 NOTE — PROGRESS NOTES
12/22/17: Attempted to call member for roho cushion serial # - not able to contact member.     Received message back from Maria Eugenia t[facility] - she does not have serial #.     Jamie Sharma RN, N  [facility]

## 2017-12-28 NOTE — PROGRESS NOTES
12/28/17: ELVIRA received call back from Howard at [facility] apologizing stating that he cannot f/u with roho but CM or member would need to.   ELVIRA placed call to [facility] at [phone] - provided serial # - they state it is from July 2015 - CM stated member received July 2017.  He stated that could be that Rolith had on shelf for 2 years so they will then need proof of purchase. This can be emailed to [email or web address].      ELVIRA placed call to [facility] - left vm for Maria Eugenia.    Jamie Sharma RN, PHN  [facility]

## 2017-12-29 ENCOUNTER — OFFICE VISIT (OUTPATIENT)
Dept: GASTROENTEROLOGY | Facility: CLINIC | Age: 67
End: 2017-12-29

## 2017-12-29 VITALS
SYSTOLIC BLOOD PRESSURE: 124 MMHG | WEIGHT: 137 LBS | OXYGEN SATURATION: 88 % | DIASTOLIC BLOOD PRESSURE: 51 MMHG | BODY MASS INDEX: 21.46 KG/M2 | HEART RATE: 74 BPM | TEMPERATURE: 98.5 F

## 2017-12-29 DIAGNOSIS — K21.9 GASTROESOPHAGEAL REFLUX DISEASE, ESOPHAGITIS PRESENCE NOT SPECIFIED: ICD-10-CM

## 2017-12-29 DIAGNOSIS — N95.2 ATROPHIC VAGINITIS: Primary | ICD-10-CM

## 2017-12-29 DIAGNOSIS — R13.19 ESOPHAGEAL DYSPHAGIA: Primary | ICD-10-CM

## 2017-12-29 RX ORDER — ESTRADIOL 0.1 MG/G
CREAM VAGINAL
Qty: 42.5 G | Refills: 11 | Status: SHIPPED | OUTPATIENT
Start: 2017-12-29 | End: 2018-06-26

## 2017-12-29 ASSESSMENT — PAIN SCALES - GENERAL: PAINLEVEL: NO PAIN (0)

## 2017-12-29 NOTE — NURSING NOTE
No chief complaint on file.      Vitals:    12/29/17 0844   BP: 124/51   BP Location: Left arm   Patient Position: Chair   Cuff Size: Adult Regular   Pulse: 74   Temp: 98.5  F (36.9  C)   SpO2: (!) 88%   Weight: 137 lb       Body mass index is 21.46 kg/(m^2).      Shayla Banegas

## 2017-12-29 NOTE — LETTER
12/29/2017       RE: Sonya Foote  [address]  [place] [address]     Dear Colleague,    Thank you for referring your patient, Sonya Foote, to the [facility] GASTRO[facility] IBD CLINIC at [facility]. Please see a copy of my visit note below.    CLINIC VISIT NOTE      CHIEF COMPLAINT:  Followup regarding dysphagia, early satiety.      HISTORY OF PRESENT ILLNESS:  Sonya is a 67-year-old woman seen in clinic on 09/08/2017 with the above complaints.  She was referred for a video swallow with esophagram and gastric emptying study.  Studies were completed in September.  She has since been seen in the ER with food impaction removed by Dr. Castrejon on 11/26, and been seen in the ER for significant abdominal pain and urinary tract infection, at which time they did a second noncontrast CT, essentially normal.  Sonya was disappointed at the last GI visit to hear that her swallowing disability would be lifelong, and to hear the recommendation for careful swallowing.  Food impaction occurred when her son brought her a turkey dinner, and she had turkey impacted.  She most of all wishes to be able to eat a steak.  She has learned that she can blenderize food, including the remainder of the turkey, and eats that and has some good flavor.      Sonya denies cough with swallow at this time, but agrees she continues to feel esophageal sensation with swallowing.  She experiences heartburn a couple of days a week.  She will have nausea from smells of foods or even smells of paint, and will sometimes vomit from these.  She does not know why she vomits otherwise, though it is usually soon postprandial.      MEDICAL HISTORY:   1.  Candida esophagitis, GERD, vomiting.   2.  Diabetes mellitus type 2 since at least 2012.   3.  Hyperlipidemia.   4.  History of CVA.   5.  History of MI.   6.  COPD.   7.  Peripheral arterial disease.   8.  BKA and AKA in November and 06/2016.   9.  Past  history of cholecystectomy.   10.  Multiple upper GI endoscopies.   11.  Colonoscopy, most recently 2014.   12.  Others as in the medical record.      MEDICATIONS:  Reviewed.  Includes:   1.  Current pantoprazole 40 mg twice daily.   2.  Sucralfate.   3.  Low-dose aspirin.   4.  Metformin 24-hour.   5.  Phenytoin.   6.  Levetiracetam.    7.  Risperidone.   8.  Lyrica.   9.  Trazodone.   10.  Apparently also intrathecal pump for bupivacaine and fentanyl.      ADVERSE DRUG REACTIONS:  Reviewed.      SOCIAL HISTORY:  Sonya lost her wife last spring.      OBJECTIVE:   VITAL SIGNS:  Reviewed.  Blood pressure is well controlled.  Weight is within her previous range at 137.  She expressed concern about her weight at the last visit, and has a simple question today.   GENERAL:  Clean, neatly groomed woman wearing bifocals.  She explains that she is legally blind and unable to read.   EYES:  Clear.   RESPIRATIONS:  Breathing is calm and grossly normal.   NEUROLOGIC:  Speech is fluent and logical.      ASSESSMENT:  A 67-year-old woman with esophageal dysphagia who had food impaction in November, though we had discussed swallowing precautions in September.  She has had an esophagram demonstrating esophageal dysmotility, which is historically known, but also demonstrating no specific stricture, and had a video swallow showing no oropharyngeal incompetence.  She has met with a dietitian, and continues to use some supplements.  We estimated what her weight would be with her feet, which reassures her that she is not severely underweight.      Sonya is advised to have a repeat upper GI endoscopy with the possibility of dilation and with the possibility of biopsies for any other cause that might be related to her dysphagia.  She describes her dysphagia as occurring over 20 years.  If it had been related to eosinophilic esophagitis, normal swallowing is, according to studies, not recoverable.  She is cautioned that she may have some  easier time swallowing pills after if she has dilation, but that she will still not be able to swallow any normal steak.  She is also edentulous, so must cut any solid foods especially small, and really should remain with a mechanical soft diet.  She understands this language and plan.      PLAN:  Upper GI endoscopy, swallowing precautions, return to Gastroenterology for discussion of results, cancel if not needed.      I have asked her to show her patient instructions to her nurses and nurse practitioner, who visit on a weekly basis, in order to have them review instructions with her as she cannot review them herself at a later time.      Again, we asked her to bring the medication sheet with her to each visit to be sure that we are able to conduct true medication reconciliation.      We reviewed swallowing precautions.      We discussed her gastric emptying study, which actually had shown a gastric emptying time with retention of 39, 16, 2 and 0 at hours 1, 2, 3 and 4 with T1/2 of 59.  However, she describes nausea increasing and having fullness and lack of appetite many times, in addition to burping.  She demonstrates a strong suggestion of slow stomach emptying.  This may be increased with ondansetron and some other medications.  We advised her thus to follow a lower fat and low-fiber meals, some of which she is already following.      Other than inability to read and review notes, there were no clear barriers to learning.  She may have a limited ability to change her diet, but has shown understanding thus far regarding lower fiber.      Total visit 30 minutes, with counseling time 20 minutes.         VICTOR M TORRES CNP             D: 2017 15:30   T: 2017 11:55   MT: JESUS      Name:     SHARATH MERCEDES   MRN:      [id number]        Account:      [id number]   :      1950           Service Date: 2017      Document: [id number]

## 2017-12-29 NOTE — PATIENT INSTRUCTIONS
"1. Please ask your nurses or your NP to review all instructions with you over time.    2. Please bring medication(s) sheets with you to each visit for what you are getting NOW.    3. Continue Swallowing precautions for esophageal dysmotility and for after anti-reflux surgery.    (poor muscle action in the esophagus)   Take small bites.   Chew well   Moisten solid or dry food with applesauce, yogurt or other sauces.   Take sips of water between bites.   \"Swallow\" twice for each bite of food.   Wait for each bite to go down before taking the next bite.   Drink liquids slowly, a swallow at a time, rather than in continuous gulps.    4. You have slow stomach emptying.  This can be worse with pain medication, with ondansetron (Zofran), and some other medication(s).   When the stomach does not empty normally, but rather very slowly, this is called gastroparesis. It may cause a person to lose appetite, to feel full fast eating only small amounts, or to have nausea and sometimes vomiting .    The most important treatment for gastroparesis is to eat low fat and low fiber meals. Fat and fiber both stay in the stomach longer; they cause slower stomach emptying in normal stomachs. They generally make gastroparesis much worse.    Brief tips for the gastroparesis diet are here:  In the low fat diet, do not eat anything deep fried or fried in oil, anything that makes your fingers greasy, or leaves oily spots on a paper napkin. Read labels to eat foods with 20% fat or less.  For low fiber, most important is to avoid popcorn, corn, and raw fruits and vegetables - especially the skins and seeds. Also avoid or eat very little peanut butter, nuts and legumes. Iceberg lettuce is low fiber, but other greens have higher fiber content, with cabbage being especially high fiber.        5. Yes, you need repeat upper GI endoscopy (EGD) .  You did well with both parts of the upper GI endoscopy (EGD) November 26, in Endoscopy with sedation and " "then in OR with anesthesia to get the turkey out of your throat..    Repeat upper GI endoscopy (EGD) is recommended to see whether the esophagus can be stretched and to see whether biopsies might help find a condition that can be treated.  This has been ordered to be done at the hospital or surgery center with sedation.    NOTE, you may be able to swallow pills better, but you will not be able to swallow steak or dry turkey without all the swallowing precautions.     UPPER ENDOSCOPY (also known as EGD- esophageogastroduodenoscopy)    If you do not receive a call to schedule this within two business days, please call Endoscopy .     You will need a  to bring you home from the exam.  You may not take a cab home unless you have an adult with you.    If you have diabetes or are on blood thinners: talk with your doctor at least one week before the exam.  They may want you to change your medicine before the test.    This exam looks at the lining of your esophagus, stomach and duodenum (first part of the small intestine).      6. Return to GI Clinic to talk about the results of the procedure.        [facility]     Until February 22, 2018 correction   For follow-up appointments call [facility] [phone]  For GI Nurse Triage  [phone], then, \"For Medical Questions, option 3\"          "

## 2017-12-29 NOTE — MR AVS SNAPSHOT
"              After Visit Summary   12/29/2017    Sonya Foote    MRN: [id number]           Patient Information     Date Of Birth          1950        Visit Information        Provider Department      12/29/2017 10:00 AM Monserrat Polk APRN CNP M [facility]        Today's Diagnoses     Esophageal dysphagia    -  1    Gastroesophageal reflux disease, esophagitis presence not specified          Care Instructions    1. Please ask your nurses or your NP to review all instructions with you over time.    2. Please bring medication(s) sheets with you to each visit for what you are getting NOW.    3. Continue Swallowing precautions for esophageal dysmotility and for after anti-reflux surgery.    (poor muscle action in the esophagus)   Take small bites.   Chew well   Moisten solid or dry food with applesauce, yogurt or other sauces.   Take sips of water between bites.   \"Swallow\" twice for each bite of food.   Wait for each bite to go down before taking the next bite.   Drink liquids slowly, a swallow at a time, rather than in continuous gulps.    4. You have slow stomach emptying.  This can be worse with pain medication, with ondansetron (Zofran), and some other medication(s).   When the stomach does not empty normally, but rather very slowly, this is called gastroparesis. It may cause a person to lose appetite, to feel full fast eating only small amounts, or to have nausea and sometimes vomiting .    The most important treatment for gastroparesis is to eat low fat and low fiber meals. Fat and fiber both stay in the stomach longer; they cause slower stomach emptying in normal stomachs. They generally make gastroparesis much worse.    Brief tips for the gastroparesis diet are here:  In the low fat diet, do not eat anything deep fried or fried in oil, anything that makes your fingers greasy, or leaves oily spots on a paper napkin. Read labels to eat foods with 20% fat or less.  For low fiber, " "most important is to avoid popcorn, corn, and raw fruits and vegetables - especially the skins and seeds. Also avoid or eat very little peanut butter, nuts and legumes. Iceberg lettuce is low fiber, but other greens have higher fiber content, with cabbage being especially high fiber.        5. Yes, you need repeat upper GI endoscopy (EGD) .  You did well with both parts of the upper GI endoscopy (EGD) November 26, in Endoscopy with sedation and then in OR with anesthesia to get the turkey out of your throat..    Repeat upper GI endoscopy (EGD) is recommended to see whether the esophagus can be stretched and to see whether biopsies might help find a condition that can be treated.  This has been ordered to be done at the hospital or surgery center with sedation.    NOTE, you may be able to swallow pills better, but you will not be able to swallow steak or dry turkey without all the swallowing precautions.     UPPER ENDOSCOPY (also known as EGD- esophageogastroduodenoscopy)    If you do not receive a call to schedule this within two business days, please call Endoscopy .     You will need a  to bring you home from the exam.  You may not take a cab home unless you have an adult with you.    If you have diabetes or are on blood thinners: talk with your doctor at least one week before the exam.  They may want you to change your medicine before the test.    This exam looks at the lining of your esophagus, stomach and duodenum (first part of the small intestine).      6. Return to GI Clinic to talk about the results of the procedure.        [facility]     Until February 22, 2018 care home   For follow-up appointments call [facility] [phone]  For GI Nurse Triage  [phone], then, \"For Medical Questions, option 3\"                  Follow-ups after your visit        Additional Services     GASTROENTEROLOGY ADULT REF PROCEDURE ONLY       Last Lab Result: Creatinine (mg/dL)       " Date                     Value                 12/06/2017               0.43 (L)         ----------  Body mass index is 21.46 kg/(m^2).     Needed:  No  Language:  English    Patient will be contacted to schedule procedure.     Please be aware that coverage of these services is subject to the terms and limitations of your health insurance plan.  Call member services at your health plan with any benefit or coverage questions.  Any procedures must be performed at a [place] facility OR coordinated by your clinic's referral office.    Please bring the following with you to your appointment:    (1) Any X-Rays, CTs or MRIs which have been performed.  Contact the facility where they were done to arrange for  prior to your scheduled appointment.    (2) List of current medications   (3) This referral request   (4) Any documents/labs given to you for this referral                  Follow-up notes from your care team     Return in about 6 weeks (around 2/9/2018).      Your next 10 appointments already scheduled     Dec 29, 2017 10:00 AM CST   (Arrive by 9:45 AM)   New Patient Visit with VICTOR M Floyd CNP   [facility]astroenter[facility] IBD [facility] ([facility])    [address] [place] [address]   [phone]            Jan 04, 2018  1:00 PM CST   (Arrive by 12:45 PM)   PAC EVALUATION with Jose Carlos Pac Bernice 8   [facility] Preoperative Assessment [place] ([facility])    [address] [place] [address]   [phone]            Jan 04, 2018  2:00 PM CST   (Arrive by 1:45 PM)   PAC RN ASSESSMENT with Jose Carlos Pac Rn   [facility] Preoperative Assessment [place] ([facility])    [address] [place] [address]   [phone]            Jan 04, 2018  2:30 PM CST   (Arrive by 2:15 PM)   PAC Anesthesia Consult with Jose Carlos Pac Anesthesiologist   [facility] Preoperative Assessment  Center ([facility] [place])    [address]  4th Floor  [place] [address]   [phone]            Jan 16, 2018   Procedure with Keanu Dawson MD   [facility] [place], Same Day Surgery (--)    [address] [address]   [phone]            Feb 08, 2018 11:30 AM CST   (Arrive by 11:15 AM)   Return Visit with VICTOR M Floyd CNP   [facility]enterology [facility] IBD [facility] ([facility])    [address]  [address]th [facility] [address] [phone]            Feb 08, 2018  1:00 PM CST   (Arrive by 12:45 PM)   Post-Op with Keanu Dawson MD   [facility] Prostate and Urologic Cancers ([facility])    [address]  [address]th [facility] [address] [phone]            Feb 19, 2018  9:30 AM CST   (Arrive by 9:15 AM)   Return Visit with Alina Jennings MD   [facility] ([facility])    [address]  [address]rd [facility] [address]   [phone]            Mar 06, 2018  9:15 AM CST   VISUAL FIELD with [facility] EYE VISUAL FIELD   Eye Clinic ([facility])    [address]  [address]  [address]  [facility] [place] [facility] [address]   [phone]            Mar 06, 2018  9:45 AM CST   RETURN GLAUCOMA with Marely Robin MD   Eye Clinic ([facility])    [address]  [address]  [address]  [facility] [address]   [phone]              Who to contact     Please call your clinic at [phone] to:    Ask questions about your health    Make or cancel appointments    Discuss your medicines    Learn about your test results    Speak to your doctor   If you have compliments or concerns about an experience at your clinic, or if you wish to file a complaint, please contact [facility] Patient Relations at  [phone] or email us at [email or web address]         Additional Information About Your Visit        to-[facility] Information     Lollipuff is an electronic gateway that provides easy, online access to your medical records. With Lollipuff, you can request a clinic appointment, read your test results, renew a prescription or communicate with your care team.     To sign up for Lollipuff visit the website at [email or web address]   You will be asked to enter the access code listed below, as well as some personal information. Please follow the directions to create your username and password.     Your access code is: [email or web address]  Expires: 2018 11:09 AM     Your access code will  in 90 days. If you need help or a new code, please contact your [facility] or call [phone] for assistance.        Care EveryWhere ID     This is your Care EveryWhere ID. This could be used by other organizations to access your [facility] medical records  [id number]        Your Vitals Were     Pulse Temperature Pulse Oximetry BMI (Body Mass Index)          74 98.5  F (36.9  C) 88% 21.46 kg/m2         Blood Pressure from Last 3 Encounters:   17 124/51   17 128/70   17 132/68    Weight from Last 3 Encounters:   17 62.1 kg (137 lb)   17 62.1 kg (137 lb)   17 62.1 kg (137 lb)              We Performed the Following     GASTROENTEROLOGY ADULT REF PROCEDURE ONLY          Today's Medication Changes          These changes are accurate as of: 17  9:37 AM.  If you have any questions, ask your nurse or doctor.               These medicines have changed or have updated prescriptions.        Dose/Directions    aspirin 81 MG EC tablet   This may have changed:  when to take this   Used for:  Unstable angina (H)        Dose:  81 mg   Take 1 tablet (81 mg) by mouth daily   Quantity:  90 tablet   Refills:  3       atorvastatin 40 MG tablet   Commonly known as:   LIPITOR   This may have changed:  when to take this   Used for:  Hyperlipidemia LDL goal <100        Dose:  40 mg   Take 1 tablet (40 mg) by mouth daily   Quantity:  90 tablet   Refills:  3       brimonidine 0.2 % ophthalmic solution   Commonly known as:  ALPHAGAN   This may have changed:  when to take this   Used for:  Primary open angle glaucoma of both eyes, severe stage        Dose:  1 drop   Place 1 drop into the right eye 3 times daily   Quantity:  15 mL   Refills:  11       hydrochlorothiazide 12.5 MG capsule   Commonly known as:  MICROZIDE   This may have changed:  additional instructions   Used for:  Essential hypertension with goal blood pressure less than 130/80        Dose:  12.5 mg   Take 1 capsule (12.5 mg) by mouth daily   Quantity:  90 capsule   Refills:  3       Phenytoin Sodium Extended 200 MG Caps   This may have changed:  additional instructions   Used for:  Seizure disorder (H)        Dose:  200 mg   Take 200 mg by mouth 2 times daily   Quantity:  60 capsule   Refills:  0                Primary Care Provider Office Phone # Fax #    Allan Casey [phone]       [address] [place] [address]        Equal Access to Services     [facility]: Hadii shaheen ku hadasho Soomaali, waaxda luqadaha, qaybta kaalmada adeetta, tonie marvin . So [facility] [phone].    ATENCIÓN: Si habla español, tiene a briones disposición servicios gratuitos de asistencia lingüística. [facility] [phone].    We comply with applicable federal civil rights laws and [place] laws. We do not discriminate on the basis of race, color, national origin, age, disability, sex, sexual orientation, or gender identity.            Thank you!     Thank you for choosing [facility]  for your care. Our goal is always to provide you with excellent care. Hearing back from our patients is one way we can continue to improve our services. Please take a few  minutes to complete the written survey that you may receive in the mail after your visit with us. Thank you!             Your Updated Medication List - Protect others around you: Learn how to safely use, store and throw away your medicines at www.disposemymeds.org.          This list is accurate as of: 12/29/17  9:37 AM.  Always use your most recent med list.                   Brand Name Dispense Instructions for use Diagnosis    ACETAMINOPHEN PO      Take 1,000 mg by mouth every 6 hours as needed for fever Taking q4-6 hours, per MAR        acetaminophen-codeine 300-30 MG per tablet    TYLENOL #3    20 tablet    Take 1 tablet by mouth every 4 hours as needed for pain maximum 3 tablet(s) per day    Tension headache       ADVAIR DISKUS 250-50 MCG/DOSE diskus inhaler   Generic drug:  fluticasone-salmeterol      Inhale 1 puff into the lungs 2 times daily        * albuterol (2.5 MG/3ML) 0.083% neb solution     360 mL    INHALE 1 VIAL VIA NEBULIZER EVERY 6 HOURS AS NEEDED    Chronic obstructive pulmonary disease, unspecified COPD type (H)       * albuterol 108 (90 BASE) MCG/ACT Inhaler    PROAIR HFA/PROVENTIL HFA/VENTOLIN HFA    3 Inhaler    Inhale 2 puffs into the lungs every 6 hours as needed for shortness of breath / dyspnea or wheezing    JOSE (obstructive sleep apnea)       aspirin 81 MG EC tablet     90 tablet    Take 1 tablet (81 mg) by mouth daily    Unstable angina (H)       atorvastatin 40 MG tablet    LIPITOR    90 tablet    Take 1 tablet (40 mg) by mouth daily    Hyperlipidemia LDL goal <100       blood glucose monitoring lancets     100 each    Use to test blood sugar 3 times daily or as directed.    Type 2 diabetes mellitus with diabetic peripheral angiopathy without gangrene, without long-term current use of insulin (H)       blood glucose monitoring meter device kit     1 kit    Use to test blood sugars 3 times daily or as directed.    Type 2 diabetes, HbA1C goal < 8% (H)       blood glucose monitoring test  strip    ONETOUCH ULTRA    3 Box    Use to test blood sugars 3 times daily or as directed.    Type 2 diabetes mellitus with diabetic peripheral angiopathy without gangrene, without long-term current use of insulin (H)       brimonidine 0.2 % ophthalmic solution    ALPHAGAN    15 mL    Place 1 drop into the right eye 3 times daily    Primary open angle glaucoma of both eyes, severe stage       calcium citrate-vitamin D 315-250 MG-UNIT Tabs per tablet    CITRACAL    120 tablet    Take 2 tablets by mouth daily    Osteoporosis       cetirizine 10 MG tablet    zyrTEC    90 tablet    Take 1 tablet (10 mg) by mouth daily as needed for allergies    Seasonal allergic rhinitis, unspecified allergic rhinitis trigger       CYANOCOBALAMIN PO      Take 2,000 mcg by mouth daily        cyclobenzaprine 10 MG tablet    FLEXERIL    30 tablet    Take 1 tablet (10 mg) by mouth 2 times daily as needed for muscle spasms    Cervicalgia       diclofenac 1 % Gel topical gel    VOLTAREN    100 g    Apply 2 g topically 4 times daily to hands    Primary osteoarthritis of both hands       dorzolamide 2 % ophthalmic solution    TRUSOPT     Place 1 drop into both eyes 2 times daily        dorzolamide-timolol 2-0.5 % ophthalmic solution    COSOPT    1 Bottle    Place 1 drop into the right eye 2 times daily    Primary open angle glaucoma of both eyes, severe stage       EPINEPHrine 0.15 MG/0.3ML injection 2-pack    EPIPEN JR    0.6 mL    Inject 0.3 mLs (0.15 mg) into the muscle as needed for anaphylaxis    Bee sting reaction, undetermined intent, subsequent encounter       escitalopram 5 MG tablet    LEXAPRO    60 tablet    Take 2 tablets (10 mg) by mouth daily    Adjustment disorder with depressed mood       estradiol 1 MG tablet    ESTRACE    90 tablet    Take 1 tablet (1 mg) by mouth daily    Person who has had sex change operation       ferrous sulfate 325 (65 FE) MG tablet    IRON    60 tablet    Take 1 tablet (325 mg) by mouth 2 times daily  With meals        fluticasone 50 MCG/ACT spray    FLONASE     Spray 1 spray into both nostrils daily        hydrochlorothiazide 12.5 MG capsule    MICROZIDE    90 capsule    Take 1 capsule (12.5 mg) by mouth daily    Essential hypertension with goal blood pressure less than 130/80       INCRUSE ELLIPTA 62.5 MCG/INH oral inhaler   Generic drug:  umeclidinium      Inhale 1 puff into the lungs daily        RENÉE Pack      Take 1 packet by mouth 2 times daily        latanoprost 0.005 % ophthalmic solution    XALATAN    2.5 mL    Place 1 drop into both eyes At Bedtime    Primary open angle glaucoma, stage unspecified       levETIRAcetam 500 MG tablet    KEPPRA    180 tablet    Take 1 tablet (500 mg) by mouth 2 times daily    Nausea       lidocaine 5 % Patch    LIDODERM    30 patch    APPLY 1 TO 3 PATCHES TO PAINFUL AREA AT ONCE FOR UP TO 12 HOURS WITHIN A 24 HOUR PERIOD REMOVE AFTER 12 HOURS    Chronic pain syndrome, 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 (H)       lisinopril 10 MG tablet    PRINIVIL/ZESTRIL    90 tablet    Take 1 tablet (10 mg) by mouth daily    Essential hypertension with goal blood pressure less than 130/80       MEDICATION GIVEN BY INTRATHECAL PUMP - INSTRUCTION      continuous May 19, 2017 - per [facility] [place] [phone]: Conc: Bupivacaine 20 mg/mL and Fentanyl 2000 mcg/mL. Continuous: Bupivacaine 7.265 mg/day and Fentanyl 726.5 mcg/day. Boluses: Up to 7 boluses per 24-hr period of Bupivicaine 0.599 mg and Fentanyl 59.9 mcg  Pump Refill Date at [facility]: 7/2/17        metFORMIN 500 MG 24 hr tablet    GLUCOPHAGE-XR    90 tablet    Take 1 tablet (500 mg) by mouth daily (with dinner)    Type 2 diabetes mellitus with diabetic peripheral angiopathy and gangrene, without long-term current use of insulin (H)       metoprolol 25 MG 24 hr tablet    TOPROL-XL    30 tablet    TAKE 1 TABLET (25 MG) BY MOUTH DAILY    Old myocardial infarction        MULTIVITAMIN PO      Take 1 tablet by mouth daily        nitroGLYcerin 0.4 MG sublingual tablet    NITROSTAT    25 tablet    Place 1 tablet (0.4 mg) under the tongue every 5 minutes as needed for chest pain if you are still having symptoms after 3 doses (15 minutes) call 911.    Chronic systolic congestive heart failure (H), Old myocardial infarction       ONDANSETRON PO      Take 4 mg by mouth 3 times daily        * order for DME     1 Month    Equipment being ordered: Depends briefs    Incontinence       * order for DME     1 Device    Equipment being ordered: Power Wheelchair    CVA (cerebral infarction), HTN (hypertension)       * order for DME     1 Box    Equipment being ordered: Glucerna daily shakes with each meal    Type 2 diabetes mellitus with other diabetic neurological complication       * order for DME     1 Units    Equipment being ordered: Nebulizer and tubing supplies    Simple chronic bronchitis (H)       * order for DME     1 Device    Equipment being ordered: CPAP supplies mask, hose, filters, cushion fax to [facility] at [phone] Disp: 10 DeviceRefills: prn Class: Local PrintStart: 2/10/2017    Chronic obstructive pulmonary disease, unspecified COPD type (H)       * order for DME     10 Device    Equipment being ordered: CPAP supplies mask, hose, filters, cushion  fax to [facility] at [phone]    COPD (chronic obstructive pulmonary disease) (H)       * order for DME     1 Device    Hospital bed with side rails    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 (H)       * order for DME     1 Device    Full electric hospital bed with half rails  Dx: I63060, I110, J449 Length of need: lifetime    Status post bilateral above knee amputation (H)       * order for DME     1 Device    Wheel Chair Cushion: 18 x 18 inch Roho cushion    Status post bilateral above knee amputation (H)       pantoprazole 40 MG EC tablet    PROTONIX    30 tablet    Take 1 tablet (40 mg) by mouth 2  times daily    Gastroesophageal reflux disease without esophagitis       Phenytoin Sodium Extended 200 MG Caps     60 capsule    Take 200 mg by mouth 2 times daily    Seizure disorder (H)       polyethylene glycol Packet    MIRALAX/GLYCOLAX     Take 17 g by mouth daily as needed Dissolved in water or juice        pregabalin 75 MG capsule    LYRICA    120 capsule    Take 2 capsules (150 mg) by mouth 2 times daily    Pain in both upper extremities       prochlorperazine 10 MG tablet    COMPAZINE    20 tablet    Take 1 tablet (10 mg) by mouth every 8 hours as needed    Nausea with vomiting       risperiDONE 0.5 MG tablet    risperDAL     Take 0.5 mg by mouth At Bedtime        sucralfate 1 GM tablet    CARAFATE    120 tablet    Take 1 tablet (1 g) by mouth 4 times daily May dissolve in 10 mL water is necessary. (Start upon completion of carafate suspension)    Adjustment disorder with depressed mood       traZODone 100 MG tablet    DESYREL    90 tablet    Take 1.5 tablets (150 mg) by mouth At Bedtime    Anxiety state       vitamin D 2000 UNITS tablet     100 tablet    Take 2,000 Units by mouth daily.        zinc 50 MG Tabs      Take 1 tablet by mouth daily        * Notice:  This list has 11 medication(s) that are the same as other medications prescribed for you. Read the directions carefully, and ask your doctor or other care provider to review them with you.

## 2017-12-30 NOTE — PROGRESS NOTES
CLINIC VISIT NOTE      CHIEF COMPLAINT:  Followup regarding dysphagia, early satiety.      HISTORY OF PRESENT ILLNESS:  Sonya is a 67-year-old woman seen in clinic on 09/08/2017 with the above complaints.  She was referred for a video swallow with esophagram and gastric emptying study.  Studies were completed in September.  She has since been seen in the ER with food impaction removed by Dr. Castrejon on 11/26, and been seen in the ER for significant abdominal pain and urinary tract infection, at which time they did a second noncontrast CT, essentially normal.  Sonya was disappointed at the last GI visit to hear that her swallowing disability would be lifelong, and to hear the recommendation for careful swallowing.  Food impaction occurred when her son brought her a turkey dinner, and she had turkey impacted.  She most of all wishes to be able to eat a steak.  She has learned that she can blenderize food, including the remainder of the turkey, and eats that and has some good flavor.      Sonya denies cough with swallow at this time, but agrees she continues to feel esophageal sensation with swallowing.  She experiences heartburn a couple of days a week.  She will have nausea from smells of foods or even smells of paint, and will sometimes vomit from these.  She does not know why she vomits otherwise, though it is usually soon postprandial.      MEDICAL HISTORY:   1.  Candida esophagitis, GERD, vomiting.   2.  Diabetes mellitus type 2 since at least 2012.   3.  Hyperlipidemia.   4.  History of CVA.   5.  History of MI.   6.  COPD.   7.  Peripheral arterial disease.   8.  BKA and AKA in November and 06/2016.   9.  Past history of cholecystectomy.   10.  Multiple upper GI endoscopies.   11.  Colonoscopy, most recently 2014.   12.  Others as in the medical record.      MEDICATIONS:  Reviewed.  Includes:   1.  Current pantoprazole 40 mg twice daily.   2.  Sucralfate.   3.  Low-dose aspirin.   4.  Metformin 24-hour.   5.   Phenytoin.   6.  Levetiracetam.    7.  Risperidone.   8.  Lyrica.   9.  Trazodone.   10.  Apparently also intrathecal pump for bupivacaine and fentanyl.      ADVERSE DRUG REACTIONS:  Reviewed.      SOCIAL HISTORY:  Sonya lost her wife last spring.      OBJECTIVE:   VITAL SIGNS:  Reviewed.  Blood pressure is well controlled.  Weight is within her previous range at 137.  She expressed concern about her weight at the last visit, and has a simple question today.   GENERAL:  Clean, neatly groomed woman wearing bifocals.  She explains that she is legally blind and unable to read.   EYES:  Clear.   RESPIRATIONS:  Breathing is calm and grossly normal.   NEUROLOGIC:  Speech is fluent and logical.      ASSESSMENT:  A 67-year-old woman with esophageal dysphagia who had food impaction in November, though we had discussed swallowing precautions in September.  She has had an esophagram demonstrating esophageal dysmotility, which is historically known, but also demonstrating no specific stricture, and had a video swallow showing no oropharyngeal incompetence.  She has met with a dietitian, and continues to use some supplements.  We estimated what her weight would be with her feet, which reassures her that she is not severely underweight.      Sonya is advised to have a repeat upper GI endoscopy with the possibility of dilation and with the possibility of biopsies for any other cause that might be related to her dysphagia.  She describes her dysphagia as occurring over 20 years.  If it had been related to eosinophilic esophagitis, normal swallowing is, according to studies, not recoverable.  She is cautioned that she may have some easier time swallowing pills after if she has dilation, but that she will still not be able to swallow any normal steak.  She is also edentulous, so must cut any solid foods especially small, and really should remain with a mechanical soft diet.  She understands this language and plan.      PLAN:  Upper GI  endoscopy, swallowing precautions, return to Gastroenterology for discussion of results, cancel if not needed.      I have asked her to show her patient instructions to her nurses and nurse practitioner, who visit on a weekly basis, in order to have them review instructions with her as she cannot review them herself at a later time.      Again, we asked her to bring the medication sheet with her to each visit to be sure that we are able to conduct true medication reconciliation.      We reviewed swallowing precautions.      We discussed her gastric emptying study, which actually had shown a gastric emptying time with retention of 39, 16, 2 and 0 at hours 1, 2, 3 and 4 with T1/2 of 59.  However, she describes nausea increasing and having fullness and lack of appetite many times, in addition to burping.  She demonstrates a strong suggestion of slow stomach emptying.  This may be increased with ondansetron and some other medications.  We advised her thus to follow a lower fat and low-fiber meals, some of which she is already following.      Other than inability to read and review notes, there were no clear barriers to learning.  She may have a limited ability to change her diet, but has shown understanding thus far regarding lower fiber.      Total visit 30 minutes, with counseling time 20 minutes.         VICTOR M TORRES CNP             D: 2017 15:30   T: 2017 11:55   MT: JESUS      Name:     SHARATH MERCEDES   MRN:      [id number]        Account:      [id number]   :      1950           Service Date: 2017      Document: [id number]

## 2018-01-03 ENCOUNTER — TELEPHONE (OUTPATIENT)
Dept: GASTROENTEROLOGY | Facility: CLINIC | Age: 68
End: 2018-01-03

## 2018-01-03 NOTE — TELEPHONE ENCOUNTER
Patient scheduled for upper endoscopy    Indication for procedure. Acid reflux, choking    Referring Provider. Dr. Casey    ? no    Arrival time verified? 9:00 am    Facility location verified? [facility] room 1-301    Instructions given regarding prep and procedure    Prep Type NPO    Are you taking any anticoagulants or blood thinners? No, baby asa    Instructions given? NPO for 6 hours     Electronic implanted devices? no    Pre procedure teaching completed? Yes    Transportation from procedure? Medical cab, metro mobility    H&P / Pre op physical completed? NA    Manuela Luz RN

## 2018-01-03 NOTE — PROGRESS NOTES
1/2/18: NO call back from [facility] re: proof of purchase.  CM placed call and left another vm for Maria Eugenia.    Member aware.     Scheduled HCD review with member and Meredith Perry for 1/9/18 at 1 p.m.    Jamie Sharma RN, N  [facility]

## 2018-01-04 ENCOUNTER — OFFICE VISIT (OUTPATIENT)
Dept: SURGERY | Facility: CLINIC | Age: 68
End: 2018-01-04
Payer: COMMERCIAL

## 2018-01-04 ENCOUNTER — APPOINTMENT (OUTPATIENT)
Dept: SURGERY | Facility: CLINIC | Age: 68
End: 2018-01-04
Payer: COMMERCIAL

## 2018-01-04 ENCOUNTER — ALLIED HEALTH/NURSE VISIT (OUTPATIENT)
Dept: UROLOGY | Facility: CLINIC | Age: 68
End: 2018-01-04
Payer: COMMERCIAL

## 2018-01-04 ENCOUNTER — ALLIED HEALTH/NURSE VISIT (OUTPATIENT)
Dept: SURGERY | Facility: CLINIC | Age: 68
End: 2018-01-04
Payer: COMMERCIAL

## 2018-01-04 ENCOUNTER — ANESTHESIA EVENT (OUTPATIENT)
Dept: SURGERY | Facility: CLINIC | Age: 68
End: 2018-01-04
Payer: COMMERCIAL

## 2018-01-04 VITALS
SYSTOLIC BLOOD PRESSURE: 96 MMHG | HEART RATE: 68 BPM | BODY MASS INDEX: 19.26 KG/M2 | OXYGEN SATURATION: 97 % | DIASTOLIC BLOOD PRESSURE: 65 MMHG | WEIGHT: 123 LBS | RESPIRATION RATE: 20 BRPM | TEMPERATURE: 97.9 F

## 2018-01-04 DIAGNOSIS — Z01.818 PREOP EXAMINATION: Primary | ICD-10-CM

## 2018-01-04 DIAGNOSIS — Z01.818 PRE-OP EXAM: Primary | ICD-10-CM

## 2018-01-04 DIAGNOSIS — R39.81 FUNCTIONAL INCONTINENCE: ICD-10-CM

## 2018-01-04 RX ORDER — LACTULOSE 10 G/15ML
20 SOLUTION ORAL PRN
COMMUNITY
End: 2020-05-29

## 2018-01-04 ASSESSMENT — ENCOUNTER SYMPTOMS
ORTHOPNEA: 0
SEIZURES: 1

## 2018-01-04 ASSESSMENT — COPD QUESTIONNAIRES
COPD: 1
CAT_SEVERITY: MODERATE

## 2018-01-04 ASSESSMENT — LIFESTYLE VARIABLES: TOBACCO_USE: 1

## 2018-01-04 NOTE — PROGRESS NOTES
Preoperative Assessment Center medication history for January 4, 2018 is complete.    See Epic admission navigator for allergy information, pharmacy and prior to admission medications.    Operating room staff will still need to confirm medications and last dose information on day of surgery.     Medication history interview sources:  nursing home MAR    Changes made to PTA medication list (reason)  Added: lactulose, compazine  Deleted: spiriva - patient said she is now only using the incruse ellipta inhaler and advair inhaler.   Flexeril - no longer taking  Dorzolamide/timolol - only on dorzolamide now  Changed: dorzolamide only in [facility], protonix to daily,     Additional medication history information (including reliability of information, actions taken by pharmacist):None    Prior to Admission medications    Medication Sig Last Dose Taking? Auth Provider   lactulose (CHRONULAC) 10 GM/15ML solution Take 20 g by mouth as needed for constipation Taking Yes Unknown, Entered By History   Prochlorperazine Maleate (COMPAZINE PO) Take 10 mg by mouth every 8 hours as needed for nausea Taking Yes Unknown, Entered By History   ESTRACE VAGINAL 0.1 MG/GM cream USE DIME SIZE AMOUNT 3X A WEEK AT NIGHT Taking Yes Elizabeth Oliver MD   acetaminophen-codeine (TYLENOL #3) 300-30 MG per tablet Take 1 tablet by mouth every 4 hours as needed for pain maximum 3 tablet(s) per day Taking Yes Allan Casey MD   pantoprazole (PROTONIX) 40 MG EC tablet Take 1 tablet (40 mg) by mouth 2 times daily  Patient taking differently: Take 40 mg by mouth daily  Taking Yes Leo Meyre APRN CNP   metFORMIN (GLUCOPHAGE-XR) 500 MG 24 hr tablet Take 1 tablet (500 mg) by mouth daily (with dinner) Taking Yes Michelle Irizarry MD   brimonidine (ALPHAGAN) 0.2 % ophthalmic solution Place 1 drop into the right eye 3 times daily  Patient taking differently: Place 1 drop into the right eye 2 times daily  Taking Yes Marely Robin,  MD   sucralfate (CARAFATE) 1 GM tablet Take 1 tablet (1 g) by mouth 4 times daily May dissolve in 10 mL water is necessary. (Start upon completion of carafate suspension) Taking Yes Allan Casey MD   escitalopram (LEXAPRO) 5 MG tablet Take 2 tablets (10 mg) by mouth daily Taking Yes Allan Casey MD   albuterol (PROAIR HFA/PROVENTIL HFA/VENTOLIN HFA) 108 (90 BASE) MCG/ACT Inhaler Inhale 2 puffs into the lungs every 6 hours as needed for shortness of breath / dyspnea or wheezing Taking Yes Allan Casey MD   umeclidinium (INCRUSE ELLIPTA) 62.5 MCG/INH oral inhaler Inhale 1 puff into the lungs daily Taking Yes Unknown, Entered By History   ONDANSETRON PO Take 4 mg by mouth 3 times daily Taking Yes Unknown, Entered By History   lidocaine (LIDODERM) 5 % Patch APPLY 1 TO 3 PATCHES TO PAINFUL AREA AT ONCE FOR UP TO 12 HOURS WITHIN A 24 HOUR PERIOD REMOVE AFTER 12 HOURS Taking Yes Allan Casey MD   metoprolol (TOPROL-XL) 25 MG 24 hr tablet TAKE 1 TABLET (25 MG) BY MOUTH DAILY Taking Yes Allan Casey MD   traZODone (DESYREL) 100 MG tablet Take 1.5 tablets (150 mg) by mouth At Bedtime Taking Yes Allan Casey MD   polyethylene glycol (MIRALAX/GLYCOLAX) Packet Take 17 g by mouth daily Dissolved in water or juice  Taking Yes Unknown, Entered By History   ACETAMINOPHEN PO Take 1,000 mg by mouth every 4 hours as needed for fever Not to exceed 4000 mg/day Taking Yes Unknown, Entered By History   pregabalin (LYRICA) 75 MG capsule Take 2 capsules (150 mg) by mouth 2 times daily Taking Yes Allan Casey MD   cetirizine (ZYRTEC) 10 MG tablet Take 1 tablet (10 mg) by mouth daily as needed for allergies Taking Yes Allan Casey MD   diclofenac (VOLTAREN) 1 % GEL topical gel Apply 2 g topically 4 times daily to hands Taking Yes Allan Casey MD   lisinopril (PRINIVIL/ZESTRIL) 10 MG tablet Take 1 tablet (10 mg) by mouth daily Taking Yes Ketroser, Bj A, MD   risperiDONE (RISPERDAL) 0.5 MG tablet Take 0.5  mg by mouth At Bedtime Taking Yes Reported, Patient   ferrous sulfate (IRON) 325 (65 FE) MG tablet Take 1 tablet (325 mg) by mouth 2 times daily With meals Taking Yes Allan Casey MD   albuterol (2.5 MG/3ML) 0.083% neb solution INHALE 1 VIAL VIA NEBULIZER EVERY 6 HOURS AS NEEDED Taking Yes Allan Casey MD   CYANOCOBALAMIN PO Take 2,000 mcg by mouth daily Taking Yes Reported, Patient   fluticasone (FLONASE) 50 MCG/ACT nasal spray Spray 1 spray into both nostrils daily Taking Yes Reported, Patient   ADVAIR DISKUS 250-50 MCG/DOSE diskus inhaler Inhale 1 puff into the lungs 2 times daily  Taking Yes Reported, Patient   calcium citrate-vitamin D (CITRACAL) 315-250 MG-UNIT TABS Take 2 tablets by mouth daily Taking Yes Allan Casey MD   hydrochlorothiazide (MICROZIDE) 12.5 MG capsule Take 1 capsule (12.5 mg) by mouth daily  Patient taking differently: Take 12.5 mg by mouth daily Hold for sbp<90 Taking Yes Allan Casey MD   estradiol (ESTRACE) 1 MG tablet Take 1 tablet (1 mg) by mouth daily Taking Yes Allan Casey MD   levETIRAcetam (KEPPRA) 500 MG tablet Take 1 tablet (500 mg) by mouth 2 times daily Taking Yes Allan Casey MD   aspirin EC 81 MG EC tablet Take 1 tablet (81 mg) by mouth daily Taking Yes Daria Mancilla MD   phenytoin 200 MG CAPS Take 200 mg by mouth 2 times daily  Patient taking differently: Take 200 mg by mouth 2 times daily (takes at 8 AM and 8 PM) Taking Yes Noah Blum MD   dorzolamide (TRUSOPT) 2 % ophthalmic solution Place 1 drop into the right eye 2 times daily  Taking Yes Reported, Patient   zinc 50 MG TABS Take 1 tablet by mouth daily Taking Yes Reported, Patient   nitroglycerin (NITROSTAT) 0.4 MG SL tablet Place 1 tablet (0.4 mg) under the tongue every 5 minutes as needed for chest pain if you are still having symptoms after 3 doses (15 minutes) call 911. Taking Yes Allan Casey MD   MEDICATION GIVEN BY INTRATHECAL PUMP - INSTRUCTION continuous January  4, 2018  - per [facility] [place] [phone]:  Conc: Bupivacaine 20 mg/mL and Fentanyl 2000 mcg/mL, morphine 2 mg/mL  Continuous:  Fentanyl 795.9 mcg/day, Bupivacaine 7.759 mg/day, Morphine 0.7959 mg/day   Boluses: Up to 7 boluses per 24-hr period of Bupivicaine 0.599 mg and Fentanyl 59.9 mcg morphine 0.0599 mg    Pump Refill Date at [facility]: 3/1/18 Taking Yes Unknown, Entered By History   latanoprost (XALATAN) 0.005 % ophthalmic solution Place 1 drop into both eyes At Bedtime Taking Yes Kendrick Wells MD   atorvastatin (LIPITOR) 40 MG tablet Take 1 tablet (40 mg) by mouth daily  Patient taking differently: Take 40 mg by mouth At Bedtime  Taking Yes Allan Casey MD   Cholecalciferol (VITAMIN D) 2000 UNITS tablet Take 2,000 Units by mouth daily. Taking Yes Reported, Patient   Multiple Vitamin (MULTIVITAMIN OR) Take 1 tablet by mouth daily  Taking Yes Reported, Patient   blood glucose monitoring (ONE TOUCH ULTRASOFT) lancets Use to test blood sugar 3 times daily or as directed.   Allan Casey MD   blood glucose monitoring (ONE TOUCH ULTRA) test strip Use to test blood sugars 3 times daily or as directed.   Allan Casey MD   order for DME Full electric hospital bed with half rails    Dx: G77652, I110, J449  Length of need: lifetime   Allan Casey MD   order for DME Wheel Chair Cushion: 18 x 18 inch Roho cushion   Allan Casey MD   order for DME Hospital bed with side rails   Allan Casey MD   EPINEPHrine (EPIPEN JR) 0.15 MG/0.3ML injection Inject 0.3 mLs (0.15 mg) into the muscle as needed for anaphylaxis  Patient not taking: Reported on 1/4/2018 Not Taking  Allan Casey MD   order for DME Equipment being ordered: CPAP supplies mask, hose, filters, cushion    fax to [facility] at [phone]   Allan Casey MD   order for DME Equipment being ordered: CPAP supplies mask, hose, filters, cushion fax to [facility] at  [phone]  Disp: 10 Device Refills: prn   Class: Local Print Start: 2/10/2017   Allan Casey MD   order for DME Equipment being ordered: Nebulizer and tubing supplies   Alina Jennings MD   Nutritional Supplements (RENÉE) PACK Take 1 packet by mouth 2 times daily   Reported, Patient   blood glucose monitoring (ONE TOUCH ULTRA 2) meter device kit Use to test blood sugars 3 times daily or as directed.   Allan Casey MD   order for DME Equipment being ordered: Glucerna daily shakes with each meal   Allan Casey MD   ORDER FOR DME Equipment being ordered: Power Wheelchair   Allan Casey MD   ORDER FOR DME Equipment being ordered: Depends briefs   Allan Casey MD         Medication history completed by: Alonzo Wilson [facility]

## 2018-01-04 NOTE — PATIENT INSTRUCTIONS
Preparing for Your Surgery      Name:  Sonya Foote   MRN:  [id number]   :  1950   Today's Date:  2018     Arriving for surgery: Insert suprapubic tube   Surgery date:  2018  Arrival time:  11:00 am  Please come to:       [facility] [address]  [address] [place]  [address]    -   parking is available in front of the hospital from 5:15 am to 8:00 pm    -  Stop at the Information Desk in the lobby    -   Inform the information person that you are here for surgery. An escort to 3c will be provided. If you would not like an escort, please proceed to 3C on the 3rd floor. [phone]     What can I eat or drink?  -  You may have solid food or milk products until 8 hours prior to your surgery.  Nothing after 5 am   -  You may have water, apple juice or 7up/Sprite until 2 hours prior to your surgery.  Until 11 am arrival time     Which medicines can I take?  -  Do NOT take these medications in the morning, the day of surgery:     Lisinopril      Hydrochlorothiazide      Metformin      Sucralfate       Please hold usual vitamins and supplements on the day of surgery            -  Please take these medications the day of surgery:      Aspirin   Inhalers       Protonix (Pantoprazole)       escitalopram (lexapro)      Metoprolol       Pregabalin      Cetirizine  (Zyrtec)      Cyanocobalamin        Estradiol       Levetiracetam  (Keppra)       Phenytoin      How do I prepare myself?  -  Take two showers: one the night before surgery; and one the morning of surgery.         Use Scrubcare or Hibiclens to wash from neck down.  You may use your own shampoo and conditioner. No other hair products.   -  Do NOT use lotion, powder, deodorant, or antiperspirant the day of your surgery.  -  Do NOT wear any makeup, fingernail polish or jewelry.  -Do not bring your own medications to the hospital, except for inhalers and eye drops.  -  Bring your ID and insurance  card.    Questions or Concerns:  If you have questions or concerns, please call the  Preoperative Assessment Center, Monday-Friday 7AM-7PM:  [phone]

## 2018-01-04 NOTE — MR AVS SNAPSHOT
After Visit Summary   1/4/2018    Sonya Foote    MRN: [id number]           Patient Information     Date Of Birth          1950        Visit Information        Provider Department      1/4/2018 12:30 PM Pharmacist, Antonio Miguel [facility] [place]        Today's Diagnoses     Preop examination    -  1       Follow-ups after your visit        Your next 10 appointments already scheduled     Jan 04, 2018  2:00 PM CST   (Arrive by 1:45 PM)   PAC RN ASSESSMENT with Antonio Pac Rn   [facility] [place] ([facility])    [address] [place] [address]   [phone]            Jan 04, 2018  2:30 PM CST   (Arrive by 2:15 PM)   PAC Anesthesia Consult with  Pac Anesthesiologist   [facility] [place] ([facility])    [address] [place] [address]   [phone]            Jan 04, 2018  2:45 PM CST   LAB with ANTONIO LAB   [facility] Lab [facility])    [address] [place] [address]   [phone]           Please do not eat 10-12 hours before your appointment if you are coming in fasting for labs on lipids, cholesterol, or glucose (sugar). This does not apply to pregnant women. Water, hot tea and black coffee (with nothing added) are okay. Do not drink other fluids, diet soda or chew gum.            Abdi 10, 2018   Procedure with Herberth Castrejon MD   [facility] [place], Endoscopy ([facility], [facility])    [address] [address]   [phone]           The [facility] is located on the corner of [address] on the [address]. It is easily accessible from virtually any point in the [place] area, via [address] and [address].            Jan 16, 2018   Procedure with Keanu Dawson MD   [facility] [place],  Same Day Surgery (--)    [address] [address]   [phone]            Feb 08, 2018 11:30 AM CST   (Arrive by 11:15 AM)   Return Visit with VICTOR M Floyd CNP   [facility] Gastroenterology and IBD [facility] ([facility])    [address]  4th [address]  [facility] [facility] [address]   [phone]            Feb 08, 2018  1:00 PM CST   (Arrive by 12:45 PM)   Post-Op with Keanu Dawson MD   [facility] Urology and [facility] for Prostate and Urologic Cancers ([facility])    [address]  [address]th [address]  [place] [address] [phone]            Feb 19, 2018  9:30 AM CST   (Arrive by 9:15 AM)   Return Visit with Alina Jennings MD   [facility] ([facility] [place])    [address]  [address]  [place] [address] [phone]            Mar 06, 2018  9:15 AM CST   VISUAL FIELD with [facility] EYE VISUAL FIELD   Eye Clinic ([facility])    [address]  [address]  [address]  [place] [facility] [address] [phone]            Mar 06, 2018  9:45 AM CST   RETURN GLAUCOMA with Marely Robin MD   Eye Clinic ([facility])    [address]  [address]  [address]  [facility] [address]   [phone]              Who to contact     Please call your clinic at [phone] to:    Ask questions about your health    Make or cancel appointments    Discuss your medicines    Learn about your test results    Speak to your doctor   If you have compliments or concerns about an experience at your clinic, or if you wish to file a complaint, please contact [facility] Patient Relations at [phone] or email us at [email or web address]         Additional Information About Your Visit        MyChart Information     [employer] is an electronic gateway that provides easy, online access to your  medical records. With [employer], you can request a clinic appointment, read your test results, renew a prescription or communicate with your care team.     To sign up for [employer] visit the website at [email or web address]   You will be asked to enter the access code listed below, as well as some personal information. Please follow the directions to create your username and password.     Your access code is: [email or web address]  Expires: 2018 11:09 AM     Your access code will  in 90 days. If you need help or a new code, please contact your [facility] or call [phone] for assistance.        Care EveryWhere ID     This is your Care EveryWhere ID. This could be used by other organizations to access your [place] medical records  [id number]         Blood Pressure from Last 3 Encounters:   18 96/65   17 124/51   17 128/70    Weight from Last 3 Encounters:   18 55.8 kg (123 lb)   17 62.1 kg (137 lb)   17 62.1 kg (137 lb)              Today, you had the following     No orders found for display         Today's Medication Changes          These changes are accurate as of: 18  1:27 PM.  If you have any questions, ask your nurse or doctor.               These medicines have changed or have updated prescriptions.        Dose/Directions    atorvastatin 40 MG tablet   Commonly known as:  LIPITOR   This may have changed:  when to take this   Used for:  Hyperlipidemia LDL goal <100        Dose:  40 mg   Take 1 tablet (40 mg) by mouth daily   Quantity:  90 tablet   Refills:  3       brimonidine 0.2 % ophthalmic solution   Commonly known as:  ALPHAGAN   This may have changed:  when to take this   Used for:  Primary open angle glaucoma of both eyes, severe stage        Dose:  1 drop   Place 1 drop into the right eye 3 times daily   Quantity:  15 mL   Refills:  11       hydrochlorothiazide 12.5 MG capsule   Commonly known as:  MICROZIDE   This may  have changed:  additional instructions   Used for:  Essential hypertension with goal blood pressure less than 130/80        Dose:  12.5 mg   Take 1 capsule (12.5 mg) by mouth daily   Quantity:  90 capsule   Refills:  3       pantoprazole 40 MG EC tablet   Commonly known as:  PROTONIX   This may have changed:  when to take this   Used for:  Gastroesophageal reflux disease without esophagitis        Dose:  40 mg   Take 1 tablet (40 mg) by mouth 2 times daily   Quantity:  30 tablet   Refills:  1       Phenytoin Sodium Extended 200 MG Caps   This may have changed:  additional instructions   Used for:  Seizure disorder (H)        Dose:  200 mg   Take 200 mg by mouth 2 times daily   Quantity:  60 capsule   Refills:  0                Primary Care Provider Office Phone # Fax #    Allan Casey [phone]       [address] [place] [address]        Equal Access to Services     ROLANDARiverside County Regional Medical CenterALFRED : John youo Sojoe, waaxda luqadaha, qaybta kaalmada shashank, tonie marvin . So [facility] [phone].    ATENCIÓN: Si habla español, tiene a briones disposición servicios gratuitos de asistencia lingüística. [facility] [phone].    We comply with applicable federal civil rights laws and [place] laws. We do not discriminate on the basis of race, color, national origin, age, disability, sex, sexual orientation, or gender identity.            Thank you!     Thank you for choosing [facility]  for your care. Our goal is always to provide you with excellent care. Hearing back from our patients is one way we can continue to improve our services. Please take a few minutes to complete the written survey that you may receive in the mail after your visit with us. Thank you!             Your Updated Medication List - Protect others around you: Learn how to safely use, store and throw away your medicines at www.disposemymeds.org.          This list is  accurate as of: 1/4/18  1:27 PM.  Always use your most recent med list.                   Brand Name Dispense Instructions for use Diagnosis    ACETAMINOPHEN PO      Take 1,000 mg by mouth every 4 hours as needed for fever Not to exceed 4000 mg/day        acetaminophen-codeine 300-30 MG per tablet    TYLENOL #3    20 tablet    Take 1 tablet by mouth every 4 hours as needed for pain maximum 3 tablet(s) per day    Tension headache       ADVAIR DISKUS 250-50 MCG/DOSE diskus inhaler   Generic drug:  fluticasone-salmeterol      Inhale 1 puff into the lungs 2 times daily        * albuterol (2.5 MG/3ML) 0.083% neb solution     360 mL    INHALE 1 VIAL VIA NEBULIZER EVERY 6 HOURS AS NEEDED    Chronic obstructive pulmonary disease, unspecified COPD type (H)       * albuterol 108 (90 BASE) MCG/ACT Inhaler    PROAIR HFA/PROVENTIL HFA/VENTOLIN HFA    3 Inhaler    Inhale 2 puffs into the lungs every 6 hours as needed for shortness of breath / dyspnea or wheezing    JOSE (obstructive sleep apnea)       aspirin 81 MG EC tablet     90 tablet    Take 1 tablet (81 mg) by mouth daily    Unstable angina (H)       atorvastatin 40 MG tablet    LIPITOR    90 tablet    Take 1 tablet (40 mg) by mouth daily    Hyperlipidemia LDL goal <100       blood glucose monitoring lancets     100 each    Use to test blood sugar 3 times daily or as directed.    Type 2 diabetes mellitus with diabetic peripheral angiopathy without gangrene, without long-term current use of insulin (H)       blood glucose monitoring meter device kit     1 kit    Use to test blood sugars 3 times daily or as directed.    Type 2 diabetes, HbA1C goal < 8% (H)       blood glucose monitoring test strip    ONETOUCH ULTRA    3 Box    Use to test blood sugars 3 times daily or as directed.    Type 2 diabetes mellitus with diabetic peripheral angiopathy without gangrene, without long-term current use of insulin (H)       brimonidine 0.2 % ophthalmic solution    ALPHAGAN    15 mL     Place 1 drop into the right eye 3 times daily    Primary open angle glaucoma of both eyes, severe stage       calcium citrate-vitamin D 315-250 MG-UNIT Tabs per tablet    CITRACAL    120 tablet    Take 2 tablets by mouth daily    Osteoporosis       cetirizine 10 MG tablet    zyrTEC    90 tablet    Take 1 tablet (10 mg) by mouth daily as needed for allergies    Seasonal allergic rhinitis, unspecified allergic rhinitis trigger       COMPAZINE PO      Take 10 mg by mouth every 8 hours as needed for nausea        CYANOCOBALAMIN PO      Take 2,000 mcg by mouth daily        diclofenac 1 % Gel topical gel    VOLTAREN    100 g    Apply 2 g topically 4 times daily to hands    Primary osteoarthritis of both hands       dorzolamide 2 % ophthalmic solution    TRUSOPT     Place 1 drop into the right eye 2 times daily        EPINEPHrine 0.15 MG/0.3ML injection 2-pack    EPIPEN JR    0.6 mL    Inject 0.3 mLs (0.15 mg) into the muscle as needed for anaphylaxis    Bee sting reaction, undetermined intent, subsequent encounter       escitalopram 5 MG tablet    LEXAPRO    60 tablet    Take 2 tablets (10 mg) by mouth daily    Adjustment disorder with depressed mood       ESTRACE VAGINAL 0.1 MG/GM cream   Generic drug:  estradiol     42.5 g    USE DIME SIZE AMOUNT 3X A WEEK AT NIGHT    Atrophic vaginitis       estradiol 1 MG tablet    ESTRACE    90 tablet    Take 1 tablet (1 mg) by mouth daily    Person who has had sex change operation       ferrous sulfate 325 (65 FE) MG tablet    IRON    60 tablet    Take 1 tablet (325 mg) by mouth 2 times daily With meals        fluticasone 50 MCG/ACT spray    FLONASE     Spray 1 spray into both nostrils daily        hydrochlorothiazide 12.5 MG capsule    MICROZIDE    90 capsule    Take 1 capsule (12.5 mg) by mouth daily    Essential hypertension with goal blood pressure less than 130/80       INCRUSE ELLIPTA 62.5 MCG/INH oral inhaler   Generic drug:  umeclidinium      Inhale 1 puff into the lungs  daily        RENÉE Pack      Take 1 packet by mouth 2 times daily        lactulose 10 GM/15ML solution    CHRONULAC     Take 20 g by mouth as needed for constipation        latanoprost 0.005 % ophthalmic solution    XALATAN    2.5 mL    Place 1 drop into both eyes At Bedtime    Primary open angle glaucoma, stage unspecified       levETIRAcetam 500 MG tablet    KEPPRA    180 tablet    Take 1 tablet (500 mg) by mouth 2 times daily    Nausea       lidocaine 5 % Patch    LIDODERM    30 patch    APPLY 1 TO 3 PATCHES TO PAINFUL AREA AT ONCE FOR UP TO 12 HOURS WITHIN A 24 HOUR PERIOD REMOVE AFTER 12 HOURS    Chronic pain syndrome, 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 (H)       lisinopril 10 MG tablet    PRINIVIL/ZESTRIL    90 tablet    Take 1 tablet (10 mg) by mouth daily    Essential hypertension with goal blood pressure less than 130/80       MEDICATION GIVEN BY INTRATHECAL PUMP - INSTRUCTION      continuous January 4, 2018  - per [facility] [place] [phone]: Conc: Bupivacaine 20 mg/mL and Fentanyl 2000 mcg/mL, morphine 2 mg/mL Continuous:  Fentanyl 795.9 mcg/day, Bupivacaine 7.759 mg/day, Morphine 0.7959 mg/day  Boluses: Up to 7 boluses per 24-hr period of Bupivicaine 0.599 mg and Fentanyl 59.9 mcg morphine 0.0599 mg  Pump Refill Date at [facility]: 3/1/18        metFORMIN 500 MG 24 hr tablet    GLUCOPHAGE-XR    90 tablet    Take 1 tablet (500 mg) by mouth daily (with dinner)    Type 2 diabetes mellitus with diabetic peripheral angiopathy and gangrene, without long-term current use of insulin (H)       metoprolol 25 MG 24 hr tablet    TOPROL-XL    30 tablet    TAKE 1 TABLET (25 MG) BY MOUTH DAILY    Old myocardial infarction       MULTIVITAMIN PO      Take 1 tablet by mouth daily        nitroGLYcerin 0.4 MG sublingual tablet    NITROSTAT    25 tablet    Place 1 tablet (0.4 mg) under the tongue every 5 minutes as needed for chest pain if you are still having  symptoms after 3 doses (15 minutes) call 911.    Chronic systolic congestive heart failure (H), Old myocardial infarction       ONDANSETRON PO      Take 4 mg by mouth 3 times daily        * order for DME     1 Month    Equipment being ordered: Depends briefs    Incontinence       * order for DME     1 Device    Equipment being ordered: Power Wheelchair    CVA (cerebral infarction), HTN (hypertension)       * order for DME     1 Box    Equipment being ordered: Glucerna daily shakes with each meal    Type 2 diabetes mellitus with other diabetic neurological complication       * order for DME     1 Units    Equipment being ordered: Nebulizer and tubing supplies    Simple chronic bronchitis (H)       * order for DME     1 Device    Equipment being ordered: CPAP supplies mask, hose, filters, cushion fax to [facility] at [phone] Disp: 10 DeviceRefills: prn Class: Local PrintStart: 2/10/2017    Chronic obstructive pulmonary disease, unspecified COPD type (H)       * order for DME     10 Device    Equipment being ordered: CPAP supplies mask, hose, filters, cushion  fax to [facility] at [phone]    COPD (chronic obstructive pulmonary disease) (H)       * order for DME     1 Device    Hospital bed with side rails    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 (H)       * order for DME     1 Device    Full electric hospital bed with half rails  Dx: E81415, I110, J449 Length of need: lifetime    Status post bilateral above knee amputation (H)       * order for DME     1 Device    Wheel Chair Cushion: 18 x 18 inch Roho cushion    Status post bilateral above knee amputation (H)       pantoprazole 40 MG EC tablet    PROTONIX    30 tablet    Take 1 tablet (40 mg) by mouth 2 times daily    Gastroesophageal reflux disease without esophagitis       Phenytoin Sodium Extended 200 MG Caps     60 capsule    Take 200 mg by mouth 2 times daily    Seizure disorder (H)       polyethylene glycol Packet     MIRALAX/GLYCOLAX     Take 17 g by mouth daily Dissolved in water or juice        pregabalin 75 MG capsule    LYRICA    120 capsule    Take 2 capsules (150 mg) by mouth 2 times daily    Pain in both upper extremities       risperiDONE 0.5 MG tablet    risperDAL     Take 0.5 mg by mouth At Bedtime        sucralfate 1 GM tablet    CARAFATE    120 tablet    Take 1 tablet (1 g) by mouth 4 times daily May dissolve in 10 mL water is necessary. (Start upon completion of carafate suspension)    Adjustment disorder with depressed mood       traZODone 100 MG tablet    DESYREL    90 tablet    Take 1.5 tablets (150 mg) by mouth At Bedtime    Anxiety state       vitamin D 2000 UNITS tablet     100 tablet    Take 2,000 Units by mouth daily.        zinc 50 MG Tabs      Take 1 tablet by mouth daily        * Notice:  This list has 11 medication(s) that are the same as other medications prescribed for you. Read the directions carefully, and ask your doctor or other care provider to review them with you.

## 2018-01-04 NOTE — H&P
Pre-Operative H & P     CC:  Preoperative exam to assess for increased cardiopulmonary risk while undergoing surgery and anesthesia.    Date of Encounter: 1/4/2018  Primary Care Physician:  Allan Casey  Sonya Foote is a 67 year old female who presents for pre-operative H & P in preparation for a Cystoscopy and Suprapubic Tube Placement with Dr. Kelly on 1/16/2018 at [facility].     Sonya Foote is a 67 year old transgender female with multiple comorbidities of CAD, s/p inferior MI with COLLEEN to pRCA & mRCA 9/17/2016, hypertension, hyperlipidemia, PVD, s/p bilateral AKA,  h/o LLE blood clot prior to amputation, sickle cell trait, iron deficiency anemia, h/o transfusion (hives associated with), JOSE, nocturnal hypoxemia, COPD, asthma, GERD, dysphagia with history of esophageal stricture s/p multiple dilations, gastroparesis, NIDDM, neuropahty; chronic low back pain (intrathecal pump in place), DDD, BPPV, seizure disorder, h/o CVA x 3, carotid stenosis s/p stent to left carotid; depression/anxiety, schizoaffective disorder, and 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  She has elected to proceed with suprapubic tube placement for management of the incontinence.      History is obtained from the patient.     Past Medical History  Past Medical History:   Diagnosis Date     Anemia      Antiplatelet or antithrombotic long-term use      Arthritis      AS (sickle cell trait) (H) 10/8/2013     Blind left eye      BPPV (benign paroxysmal positional vertigo)      Chronic back pain      COPD (chronic obstructive pulmonary disease) (H)      Coronary artery disease      CVA (cerebral infarction) 10/8/2012    x3, residual left sided weakness     Diastolic CHF (H) 9/4/2012     Dilantin toxicity 5/31/2013     Esophageal candidiasis (H)      GERD (gastroesophageal reflux disease)      Heart attack       Hernia, abdominal      History of blood transfusion     x5     History of thrombophlebitis      HTN (hypertension) 9/4/2012     Hyperlipidemia LDL goal <100 9/4/2012     Legally blind in right eye, as defined in [place]     No periphal vision right eye     Osteoporosis 1/21/2013     Other chronic pain      PAD (peripheral artery disease) (H)      Palpitations      Person who has had sex change operation 1/20/2014     Pneumonia      Schizoaffective disorder (H)      Seizure disorder (H) 9/4/2012     Sleep apnea     CPAP     Thrombosis of leg      Type 2 diabetes, HbA1C goal < 8% (H) 9/4/2012     Uncomplicated asthma      Unspecified cerebral artery occlusion with cerebral infarction        Past Surgical History  Past Surgical History:   Procedure Laterality Date     AMPUTATE LEG ABOVE KNEE Left 6/11/2016    Procedure: AMPUTATE LEG ABOVE KNEE;  Surgeon: Mello Rodriguez MD;  [facility]     AMPUTATE LEG BELOW KNEE Right 11/7/2016    Procedure: AMPUTATE LEG BELOW KNEE;  Surgeon: Savannah Durant MD;  [facility]     AMPUTATE REVISION STUMP LOWER EXTREMITY Right 11/11/2016    Procedure: AMPUTATE REVISION STUMP LOWER EXTREMITY;  Surgeon: Savannah Durant MD;  [facility]     AMPUTATE REVISION STUMP LOWER EXTREMITY Right 11/16/2016    Procedure: AMPUTATE REVISION STUMP LOWER EXTREMITY;  Surgeon: Savannah Durant MD;  [facility]     AMPUTATE TOE(S) Right 1/5/2016    Procedure: AMPUTATE TOE(S);  Surgeon: Mello Gaines DPM;  Location: [facility]     ANGIOGRAM Bilateral 11/21/2014    Procedure: ANGIOGRAM;  Surgeon: Savannah Durant MD;  [facility]     ANGIOGRAM Left 1/16/2015    Procedure: ANGIOGRAM;  Surgeon: Savannah Durant MD;  [facility]     ANGIOGRAM Bilateral 9/14/2015    Procedure: ANGIOGRAM;  Surgeon: Savannah Durant MD;  [facility]     ANGIOGRAM Left 10/12/2015    Procedure: ANGIOGRAM;  Surgeon: Savannah Durant MD;  [facility]     ANGIOGRAM Right 6/6/2016    Procedure: ANGIOGRAM;  Surgeon:  Savannah Durant MD;  [facility]     ANGIOPLASTY Right 6/6/2016    Procedure: ANGIOPLASTY;  Surgeon: Savannah Durant MD;  [facility]     APPENDECTOMY       BREAST SURGERY      right breast bx (benign)     CHOLECYSTECTOMY       COLONOSCOPY N/A 8/25/2014    Procedure: COLONOSCOPY;  Surgeon: Mello Ferrer MD;  [facility]     COLONOSCOPY WITH CO2 INSUFFLATION N/A 8/20/2014    Procedure: COLONOSCOPY WITH CO2 INSUFFLATION;  Surgeon: Duane, William Charles, MD;  [facility]     ENDARTERECTOMY FEMORAL  5/23/2014    Procedure: ENDARTERECTOMY FEMORAL;  Surgeon: Jason Joshi MD;  [facility]     ESOPHAGOSCOPY, GASTROSCOPY, DUODENOSCOPY (EGD), COMBINED  12/14/2012    Procedure: COMBINED ESOPHAGOSCOPY, GASTROSCOPY, DUODENOSCOPY (EGD), BIOPSY SINGLE OR MULTIPLE;  ESOPHAGOSCOPY, GASTROSCOPY, DUODENOSCOPY (EGD), DILATATION ;  Surgeon: Elizabeth Stevenson MD;  Location:  GI     ESOPHAGOSCOPY, GASTROSCOPY, DUODENOSCOPY (EGD), COMBINED  12/31/2013    Procedure: COMBINED ESOPHAGOSCOPY, GASTROSCOPY, DUODENOSCOPY (EGD), BIOPSY SINGLE OR MULTIPLE;;  Surgeon: Clemente Lopez MD;  [facility]     ESOPHAGOSCOPY, GASTROSCOPY, DUODENOSCOPY (EGD), COMBINED  4/1/2014    Procedure: COMBINED ESOPHAGOSCOPY, GASTROSCOPY, DUODENOSCOPY (EGD);;  Surgeon: Clemente Lopez MD;  [facility]     ESOPHAGOSCOPY, GASTROSCOPY, DUODENOSCOPY (EGD), COMBINED  6/28/2014    Procedure: COMBINED ESOPHAGOSCOPY, GASTROSCOPY, DUODENOSCOPY (EGD);  Surgeon: Clemente Lopez MD;  [facility]     ESOPHAGOSCOPY, GASTROSCOPY, DUODENOSCOPY (EGD), COMBINED N/A 8/20/2014    Procedure: COMBINED ESOPHAGOSCOPY, GASTROSCOPY, DUODENOSCOPY (EGD), BIOPSY SINGLE OR MULTIPLE;  Surgeon: Duane, William Charles, MD;  [facility]     ESOPHAGOSCOPY, GASTROSCOPY, DUODENOSCOPY (EGD), COMBINED N/A 8/22/2014    Procedure: COMBINED ESOPHAGOSCOPY, GASTROSCOPY, DUODENOSCOPY (EGD), BIOPSY SINGLE OR MULTIPLE;  Surgeon: Mello Ferrer MD;  Location:  GI      ESOPHAGOSCOPY, GASTROSCOPY, DUODENOSCOPY (EGD), COMBINED N/A 10/2/2014    Procedure: COMBINED ESOPHAGOSCOPY, GASTROSCOPY, DUODENOSCOPY (EGD), BIOPSY SINGLE OR MULTIPLE;  Surgeon: Remy Haskins MD;  [facility]     ESOPHAGOSCOPY, GASTROSCOPY, DUODENOSCOPY (EGD), COMBINED Left 12/15/2014    Procedure: COMBINED ESOPHAGOSCOPY, GASTROSCOPY, DUODENOSCOPY (EGD), BIOPSY SINGLE OR MULTIPLE;  Surgeon: Remy Haskins MD;  [facility]     ESOPHAGOSCOPY, GASTROSCOPY, DUODENOSCOPY (EGD), COMBINED N/A 2/25/2015    Procedure: COMBINED ENDOSCOPIC ULTRASOUND, ESOPHAGOSCOPY, GASTROSCOPY, DUODENOSCOPY (EGD), FINE NEEDLE ASPIRATE/BIOPSY;  Surgeon: Clemente Lugo MD;  [facility]     ESOPHAGOSCOPY, GASTROSCOPY, DUODENOSCOPY (EGD), COMBINED Left 2/25/2015    Procedure: COMBINED ESOPHAGOSCOPY, GASTROSCOPY, DUODENOSCOPY (EGD), BIOPSY SINGLE OR MULTIPLE;  Surgeon: Clemente Lugo MD;  [facility]     ESOPHAGOSCOPY, GASTROSCOPY, DUODENOSCOPY (EGD), COMBINED N/A 9/25/2016    Procedure: COMBINED ESOPHAGOSCOPY, GASTROSCOPY, DUODENOSCOPY (EGD);  Surgeon: Aziza Patiño MD;  [facility]     ESOPHAGOSCOPY, GASTROSCOPY, DUODENOSCOPY (EGD), COMBINED N/A 1/18/2017    Procedure: COMBINED ESOPHAGOSCOPY, GASTROSCOPY, DUODENOSCOPY (EGD), BIOPSY SINGLE OR MULTIPLE;  Surgeon: Clemente Lopez MD;  [facility]     ESOPHAGOSCOPY, GASTROSCOPY, DUODENOSCOPY (EGD), COMBINED N/A 11/26/2017    Procedure: COMBINED ESOPHAGOSCOPY, GASTROSCOPY, DUODENOSCOPY (EGD), REMOVE FOREIGN BODY;  Esophagogastroduodenoscopy with foreign body extraction  ;  Surgeon: Herberth Castrejon MD;  [facility]     ESOPHAGOSCOPY, GASTROSCOPY, DUODENOSCOPY (EGD), COMBINED N/A 11/26/2017    Procedure: COMBINED ESOPHAGOSCOPY, GASTROSCOPY, DUODENOSCOPY (EGD), REMOVE FOREIGN BODY;;  Surgeon: Herberth Castrejon MD;  [facility]     FASCIOTOMY LOWER EXTREMITY Left 6/10/2016    Procedure: FASCIOTOMY LOWER EXTREMITY;  Surgeon: Michael  Mello Brooks MD;  [facility]     HC CAPSULE ENDOSCOPY N/A 8/25/2014    Procedure: CAPSULE/PILL CAM ENDOSCOPY;  Surgeon: Remy Haskins MD;  [facility]     HC CAPSULE ENDOSCOPY N/A 10/2/2014    Procedure: CAPSULE/PILL CAM ENDOSCOPY;  Surgeon: Remy Haskins MD;  [facility]     ORTHOPEDIC SURGERY      broken wrist repair     SEX TRANSFORMATION SURGERY, MALE TO FEMALE      1974     SINUS SURGERY      cyst removed     TONSILLECTOMY       VASCULAR SURGERY      Left carotid stent       Hx of Blood transfusions/reactions: yes - noted hives reaction, but patient denies     Hx of abnormal bleeding or anti-platelet use: on aspirin    Menstrual history: No LMP recorded. Patient is not currently having periods (Reason: Amenorrhea).    Steroid use in the last year: none    Personal or FH with difficulty with Anesthesia:  none    Prior to Admission Medications  Current Outpatient Prescriptions   Medication Sig Dispense Refill     lactulose (CHRONULAC) 10 GM/15ML solution Take 20 g by mouth as needed for constipation       Prochlorperazine Maleate (COMPAZINE PO) Take 10 mg by mouth every 8 hours as needed for nausea       ESTRACE VAGINAL 0.1 MG/GM cream USE DIME SIZE AMOUNT 3X A WEEK AT NIGHT 42.5 g 11     acetaminophen-codeine (TYLENOL #3) 300-30 MG per tablet Take 1 tablet by mouth every 4 hours as needed for pain maximum 3 tablet(s) per day 20 tablet 0     pantoprazole (PROTONIX) 40 MG EC tablet Take 1 tablet (40 mg) by mouth 2 times daily (Patient taking differently: Take 40 mg by mouth daily ) 30 tablet 1     metFORMIN (GLUCOPHAGE-XR) 500 MG 24 hr tablet Take 1 tablet (500 mg) by mouth daily (with dinner) 90 tablet 3     brimonidine (ALPHAGAN) 0.2 % ophthalmic solution Place 1 drop into the right eye 3 times daily (Patient taking differently: Place 1 drop into the right eye 2 times daily ) 15 mL 11     sucralfate (CARAFATE) 1 GM tablet Take 1 tablet (1 g) by mouth 4 times daily May dissolve in  10 mL water is necessary. (Start upon completion of carafate suspension) 120 tablet 11     escitalopram (LEXAPRO) 5 MG tablet Take 2 tablets (10 mg) by mouth daily 60 tablet 5     albuterol (PROAIR HFA/PROVENTIL HFA/VENTOLIN HFA) 108 (90 BASE) MCG/ACT Inhaler Inhale 2 puffs into the lungs every 6 hours as needed for shortness of breath / dyspnea or wheezing 3 Inhaler 1     umeclidinium (INCRUSE ELLIPTA) 62.5 MCG/INH oral inhaler Inhale 1 puff into the lungs daily       ONDANSETRON PO Take 4 mg by mouth 3 times daily       lidocaine (LIDODERM) 5 % Patch APPLY 1 TO 3 PATCHES TO PAINFUL AREA AT ONCE FOR UP TO 12 HOURS WITHIN A 24 HOUR PERIOD REMOVE AFTER 12 HOURS 30 patch 5     blood glucose monitoring (ONE TOUCH ULTRASOFT) lancets Use to test blood sugar 3 times daily or as directed. 100 each PRN     blood glucose monitoring (ONE TOUCH ULTRA) test strip Use to test blood sugars 3 times daily or as directed. 3 Box 3     metoprolol (TOPROL-XL) 25 MG 24 hr tablet TAKE 1 TABLET (25 MG) BY MOUTH DAILY 30 tablet 10     traZODone (DESYREL) 100 MG tablet Take 1.5 tablets (150 mg) by mouth At Bedtime 90 tablet 3     order for [facility] Full electric hospital bed with half rails    Dx: V39545, I110, J449  Length of need: lifetime 1 Device 0     order for [facility] Wheel Chair Cushion: 18 x 18 inch Roho cushion 1 Device 0     order for [facility] Hospital bed with side rails 1 Device 0     polyethylene glycol (MIRALAX/GLYCOLAX) Packet Take 17 g by mouth daily Dissolved in water or juice        ACETAMINOPHEN PO Take 1,000 mg by mouth every 4 hours as needed for fever Not to exceed 4000 mg/day       pregabalin (LYRICA) 75 MG capsule Take 2 capsules (150 mg) by mouth 2 times daily 120 capsule 5     cetirizine (ZYRTEC) 10 MG tablet Take 1 tablet (10 mg) by mouth daily as needed for allergies 90 tablet 3     diclofenac (VOLTAREN) 1 % GEL topical gel Apply 2 g topically 4 times daily to hands 100 g 11     EPINEPHrine (EPIPEN JR) 0.15 MG/0.3ML injection  Inject 0.3 mLs (0.15 mg) into the muscle as needed for anaphylaxis (Patient not taking: Reported on 1/4/2018) 0.6 mL 1     lisinopril (PRINIVIL/ZESTRIL) 10 MG tablet Take 1 tablet (10 mg) by mouth daily 90 tablet 3     risperiDONE (RISPERDAL) 0.5 MG tablet Take 0.5 mg by mouth At Bedtime       ferrous sulfate (IRON) 325 (65 FE) MG tablet Take 1 tablet (325 mg) by mouth 2 times daily With meals 60 tablet 2     order for DME Equipment being ordered: CPAP supplies mask, hose, filters, cushion    fax to [facility] at [phone] 10 Device prn     order for DME Equipment being ordered: CPAP supplies mask, hose, filters, cushion fax to [facility] at [phone]  Disp: 10 Device Refills: prn   Class: Local Print Start: 2/10/2017 1 Device 0     order for DME Equipment being ordered: Nebulizer and tubing supplies 1 Units 0     albuterol (2.5 MG/3ML) 0.083% neb solution INHALE 1 VIAL VIA NEBULIZER EVERY 6 HOURS AS NEEDED 360 mL 11     CYANOCOBALAMIN PO Take 2,000 mcg by mouth daily       Nutritional Supplements (RENÉE) PACK Take 1 packet by mouth 2 times daily       fluticasone (FLONASE) 50 MCG/ACT nasal spray Spray 1 spray into both nostrils daily       ADVAIR DISKUS 250-50 MCG/DOSE diskus inhaler Inhale 1 puff into the lungs 2 times daily        calcium citrate-vitamin D (CITRACAL) 315-250 MG-UNIT TABS Take 2 tablets by mouth daily 120 tablet 5     hydrochlorothiazide (MICROZIDE) 12.5 MG capsule Take 1 capsule (12.5 mg) by mouth daily (Patient taking differently: Take 12.5 mg by mouth daily Hold for sbp<90) 90 capsule 3     estradiol (ESTRACE) 1 MG tablet Take 1 tablet (1 mg) by mouth daily 90 tablet 3     levETIRAcetam (KEPPRA) 500 MG tablet Take 1 tablet (500 mg) by mouth 2 times daily 180 tablet 1     aspirin EC 81 MG EC tablet Take 1 tablet (81 mg) by mouth daily 90 tablet 3     blood glucose monitoring (ONE TOUCH ULTRA 2) meter device kit Use to test blood sugars 3 times daily or as directed. 1 kit 0      phenytoin 200 MG CAPS Take 200 mg by mouth 2 times daily (Patient taking differently: Take 200 mg by mouth 2 times daily (takes at 8 AM and 8 PM)) 60 capsule 0     dorzolamide (TRUSOPT) 2 % ophthalmic solution Place 1 drop into the right eye 2 times daily        zinc 50 MG TABS Take 1 tablet by mouth daily       nitroglycerin (NITROSTAT) 0.4 MG SL tablet Place 1 tablet (0.4 mg) under the tongue every 5 minutes as needed for chest pain if you are still having symptoms after 3 doses (15 minutes) call 911. 25 tablet 1     MEDICATION GIVEN BY INTRATHECAL PUMP - INSTRUCTION continuous January 4, 2018  - per [facility] [place] [phone]:  Conc: Bupivacaine 20 mg/mL and Fentanyl 2000 mcg/mL, morphine 2 mg/mL  Continuous:  Fentanyl 795.9 mcg/day, Bupivacaine 7.759 mg/day, Morphine 0.7959 mg/day   Boluses: Up to 7 boluses per 24-hr period of Bupivicaine 0.599 mg and Fentanyl 59.9 mcg morphine 0.0599 mg    Pump Refill Date at [facility]: 3/1/18       latanoprost (XALATAN) 0.005 % ophthalmic solution Place 1 drop into both eyes At Bedtime 2.5 mL 11     atorvastatin (LIPITOR) 40 MG tablet Take 1 tablet (40 mg) by mouth daily (Patient taking differently: Take 40 mg by mouth At Bedtime ) 90 tablet 3     order for DME Equipment being ordered: Glucerna daily shakes with each meal 1 Box 11     ORDER FOR DME Equipment being ordered: Power Wheelchair 1 Device 0     ORDER FOR DME Equipment being ordered: Depends briefs 1 Month 11     Cholecalciferol (VITAMIN D) 2000 UNITS tablet Take 2,000 Units by mouth daily. 100 tablet 3     Multiple Vitamin (MULTIVITAMIN OR) Take 1 tablet by mouth daily          Allergies  Allergies   Allergen Reactions     Bee Venom      Penicillins Anaphylaxis     Other reaction(s): Skin Rash and/or Hives     Dilantin [Phenytoin] Other (See Comments)     Generic dilantin only per pt     Iodide Hives     09/11/15: Pt states she can use iodine and doesn't have any problems       Iodine-131      Novocaine [Procaine] Hives     Other reaction(s): Skin Rash and/or Hives     Tositumomab        Social History  Social History     Social History     Marital status:      Spouse name: N/A     Number of children: 2     Years of education: N/A     Occupational History     retired      retired     Social History Main Topics     Smoking status: Former Smoker     Packs/day: 2.50     Years: 30.00     Types: Cigarettes, Cigars     Quit date: 11/1/2001     Smokeless tobacco: Never Used     Alcohol use No     Drug use: No     Sexual activity: Not Currently     Other Topics Concern     Caffeine Concern No     1 in the morning     Social History Narrative      Her father was in the  and she moved around a lot when she was a kid, and has lived abroad including in [place] and the [facility].  She was previously employed as a mental health worker. Moved from [place] to [place] in 2012. She is currently living in assisted living ([facility]).  Wife passed away 6/2017.            She has 2 adopted children (Kam and Prashanth)       Family History  Family History   Problem Relation Age of Onset     Dementia Mother      Glaucoma Mother      DIABETES Mother      may have been type 1, childhood     Coronary Artery Disease Mother      MI     Glaucoma Father      DIABETES Father      may hev been type 1 - ??     Heart Failure Father      CANCER Maternal Aunt      leukemia     Schizophrenia Brother      Depression Brother      Suicide Sister      Depression Sister      DIABETES Sister      CANCER Maternal Aunt      ovarian     Glaucoma Maternal Grandmother      DIABETES Maternal Grandmother      Glaucoma Maternal Grandfather      DIABETES Maternal Grandfather      Glaucoma Paternal Grandmother      DIABETES Paternal Grandmother      Glaucoma Paternal Grandfather      DIABETES Paternal Grandfather      Breast Cancer Sister      CEREBROVASCULAR DISEASE Brother      Colon Cancer No  "family hx of                ROS/MED HX  The complete review of systems is negative other than noted in the HPI or here.     ENT/Pulmonary: Comment: [facility] - uses \"Pocket talker\" to enhance hearing.      (+)sleep apnea, allergic rhinitis, vocal cord abnormalities- Horseness, tobacco use (Quit smoking cigarettes 16 years ago.  Smoked 1-2 PPD for 30 years.  Now vaping only.), Current use vaping only packs/day  Moderate Persistent asthma Last exacerbation: > 1year ago,Treatment: Inhaled steroids, Nebulizer daily and Inhaler prn,  moderate COPD, O2 dependent, during Nighttime 2L with CPAP liters/min,  uses CPAP on Oxygen 2L at night cmH2O , . .   : Wears corrective lenses.  Cannot see out of left eye.  Limited right eye vision with no peripheral vision. Legally blind.    Neurologic:     (+)neuropathy - hands, seizures last seizure:  [place] Mal 4-5  time per day.  Grand Mal - last 2005. features: \"stops talking and forgets what was said\", CVA (Cerebral vascular dz,s/p stent to left carotid artery 2005.) date: 2005, 2007 & 2008 with deficits-  left side weakness, peripheral vision loss, dysphagia   , other neuro Legally blind (no peripheral vision)  RLS. BPPV.  glaucoma. poor memory    Cardiovascular: Comment: PVD and thrombosis of LLE, s/p left AKA 6/6/2016   PVD RLE, s/p right AKA 11/23/2016.  Left carotid stent.  Carotid US 10/2017: < 50% bilat stenosis.       (+) Dyslipidemia, hypertension-Peripheral Vascular Disease-- Carotid Stenosis, CAD, -past MI (2005 and Inferior MI 9/2016.),-stent (s/p COLLEEN to pRCA and mRCA),9/2016   2 Drug Eluting Stent .. Taking blood thinners Pt has received instructions: Instructions Given to patient: stay on ASA. CHF (Plavix started September 2016 post stent placement.   ASA daily.) etiology: diastolic heart failure Last EF: 80%  date: 10/2017 . BEDOYA, . :. . Previous cardiac testing Echodate:12/22/2016results:Interpretation Summary  Left ventricular size is normal.  The Ejection Fraction is " estimated at 35-40%.  Inferior wall akinesis is present.  Septal,basal to mid anterior wall akinesis.  Right ventricular function, chamber size, wall motion, and thickness are  normal.  Compared to prior study,the wallmotion abnormality is new.Stress Testdate:10/2/2017 results:Impression  1.  Myocardial perfusion imaging using single isotope technique  demonstrated a small, basal inferior nontransmural infarction and a  very small area of mild ischemia involving the distal inferolateral  wall and apex.   2. Gated images demonstrated mild basal inferior hypokinesis.  The  left ventricular systolic function is normal, with an ejection  fraction measured at 80%.  3. No previous study available for comparisonECG reviewed date:12/5/2017 results:Sinus bradycardiaCath date: 9/17/2016 results:  Diagnosis  1.                   1-vessel coronary artery disease (RCA), without left main lesion.  2.                   Successful angioplasty (COLLEEN) of the proximal RCA.  3.                   Successful angioplasty (COLLEEN) of the mid RCA.         (-) angina, orthopnea/PND and angina   METS/Exercise Tolerance: Comment: Patient lives in assisted living.  Is able to dress herself, but does need help with showering. 1 - Eating, dressing   Hematologic:     (+) History of blood clots (Left LE  prior to AKA.) pt is not anticoagulated, Anemia, History of Transfusion no previous transfusion reaction Other Hematologic Disorder-Sickle cell trait      Musculoskeletal:   (+) arthritis (hands), , , other musculoskeletal- DDD, chronic low back pain.  intrathecal pump in place.        GI/Hepatic: Comment: Chronic dysphagia   Esophageal strictures and previous dilations.    (+) GERD Asymptomatic on medication, Other GI/Hepatic gastroparesis     Acute appendicitis: s/p appendectomy. Cholecystitis/cholelithiasis: s/p cholecystectomy.   Renal/Genitourinary:     (+) Other Renal/ Genitourinary, Frequent UTI's.  Functional incontinence - wears depends.       Endo:     (+) type II DM Last HgA1c: 5.1 date: 11/10/2017 Not using insulin - not using insulin pump Normal glucose range: 140-200 not previously admitted for DM/DKA Diabetic complications: neuropathy gastroparesis cardiac problems, .      Psychiatric:     (+) psychiatric history anxiety, depression and other (comment) (Schizoaffective disorder)      Infectious Disease:  - neg infectious disease ROS       Malignancy:      - no malignancy   Other: Comment: Chronic pain from neuropathy and PVD  Intrathecal pump with bupivacaine and fentanyl located in right lower back.   (+) No chance of pregnancy C-spine cleared: N/A, H/O Chronic Pain,H/O chronic opiod use , other significant disability Wheelchair bound and Blind                 Temp: 97.9  F (36.6  C) Temp src: Oral BP: 96/65 Pulse: 68   Resp: 20 SpO2: 97 %         123 lbs 0 oz  Data Unavailable   Body mass index is 19.26 kg/(m^2).       Physical Exam  Physical exam limited- patient in her wheelchair and wrapped in blankets and multiple layers of clothes.    Constitutional: Awake, alert, cooperative, no apparent distress, and appears stated age.  Eyes: Pupils equal, round and reactive to light, extra ocular muscles intact, sclera clear, conjunctiva normal.  HENT: Normocephalic, oral pharynx with moist mucus membranes. Edentulous. No goiter appreciated.   Respiratory: Clear to auscultation bilaterally, no crackles or wheezing.  Cardiovascular: Regular rate and rhythm, normal S1 and S2, and no murmur noted.  Carotids +2, no bruits. Palpable radial pulses  GI: Normal bowel sounds, soft  Lymph/Hematologic: No cervical lymphadenopathy and no supraclavicular lymphadenopathy.  Genitourinary:  deferred  Skin: Warm and dry.    Musculoskeletal: Slightly limited ROM of neck. There is no redness, warmth, or swelling of the exposed joints. BUE strength is normal on the right, but 2-3/5 on the left.   Bilateral AKA.  Neurologic: Awake, alert, oriented to name, place and time.  Cranial nerves II-XII are grossly intact.   Neuropsychiatric: Calm, cooperative. Normal affect.     Labs: (personally reviewed)    Component      Latest Ref Rng & Units 12/6/2017   WBC      4.0 - 11.0 10e9/L 8.6   RBC Count      3.8 - 5.2 10e12/L 4.58   Hemoglobin      11.7 - 15.7 g/dL 12.6   Hematocrit      35.0 - 47.0 % 38.5   MCV      78 - 100 fl 84   MCH      26.5 - 33.0 pg 27.5   MCHC      31.5 - 36.5 g/dL 32.7   RDW      10.0 - 15.0 % 15.7 (H)   Platelet Count      150 - 450 10e9/L 280   Diff Method       Automated Method   % Neutrophils      % 74.7   % Lymphocytes      % 14.9   % Monocytes      % 9.3   % Eosinophils      % 0.8   % Basophils      % 0.1   % Immature Granulocytes      % 0.2   Nucleated RBCs      0 /100 0   Absolute Neutrophil      1.6 - 8.3 10e9/L 6.4   Absolute Lymphocytes      0.8 - 5.3 10e9/L 1.3   Absolute Monocytes      0.0 - 1.3 10e9/L 0.8   Absolute Eosinophils      0.0 - 0.7 10e9/L 0.1   Absolute Basophils      0.0 - 0.2 10e9/L 0.0   Abs Immature Granulocytes      0 - 0.4 10e9/L 0.0   Absolute Nucleated RBC       0.0   Sodium      133 - 144 mmol/L 139   Potassium      3.4 - 5.3 mmol/L 3.6   Chloride      94 - 109 mmol/L 106   Carbon Dioxide      20 - 32 mmol/L 26   Anion Gap      3 - 14 mmol/L 8   Glucose      70 - 99 mg/dL 85   Urea Nitrogen      7 - 30 mg/dL 8   Creatinine      0.52 - 1.04 mg/dL 0.43 (L)   GFR Estimate      >60 mL/min/1.7m2 >90   GFR Estimate If Black      >60 mL/min/1.7m2 >90   Calcium      8.5 - 10.1 mg/dL 9.2   Bilirubin Total      0.2 - 1.3 mg/dL 0.2   Albumin      3.4 - 5.0 g/dL 2.9 (L)   Protein Total      6.8 - 8.8 g/dL 7.8   Alkaline Phosphatase      40 - 150 U/L 235 (H)   ALT      0 - 50 U/L 45   AST      0 - 45 U/L 34     Carotid Ultrasound  10/26/2017  IMPRESSION:    1. Less than 50% diameter stenosis of the right ICA relative to the  distal ICA diameter, velocities are decreased when compared to the  prior study.  2. Less than 50 diameter stenosis of the  left ICA relative to the  distal ICA diameter, velocities are decreased when compared to the  prior study. Left common carotid to internal carotid artery stent is  patent.       Stress Test  10/2/2017  Impression  1.  Myocardial perfusion imaging using single isotope technique  demonstrated a small, basal inferior nontransmural infarction and a  very small area of mild ischemia involving the distal inferolateral  wall and apex.   2. Gated images demonstrated mild basal inferior hypokinesis.  The  left ventricular systolic function is normal, with an ejection  fraction measured at 80%.  3. No previous study available for comparison    EKG  12/5/2017  sinus bradycardia    Echocardiogram  12/22/2016  Interpretation Summary  Left ventricular size is normal.  The Ejection Fraction is estimated at 35-40%.  Inferior wall akinesis is present.  Septal,basal to mid anterior wall akinesis.  Right ventricular function, chamber size, wall motion, and thickness are  normal.  Compared to prior study,the wallmotion abnormality is new.       Cardiac catheterization 9/17/2016  Diagnosis  1.                   1-vessel coronary artery disease (RCA), without left main lesion.  2.                   Successful angioplasty (COLLEEN) of the proximal RCA.  3.                   Successful angioplasty (COLLEEN) of the mid RCA.        ASSESSMENT and PLAN  Sonya Foote is a 67 year old transgender female scheduled for a Cystoscopy and Suprapubic Tube Placement  on 1/16/2018 by Dr. Dawson in treatment of functional incontinence.  PAC referral for risk assessment and optimization for anesthesia with comorbid conditions of: CAD, s/p inferior MI with COLLEEN to pRCA & mRCA 9/17/2016, hypertension, hyperlipidemia, PVD, s/p bilateral AKA,  h/o LLE blood clot prior to amputation, sickle cell trait, iron deficiency anemia, h/o transfusion (hives associated with), JOES, nocturnal hypoxemia, COPD, asthma, GERD, dysphagia with history of esophageal stricture s/p multiple  dilations, gastroparesis, NIDDM, neuropahty; chronic low back pain (intrathecal pump in place), DDD, BPPV, seizure disorder, h/o CVA x 3, carotid stenosis s/p stent to left carotid; depression/anxiety, schizoaffective disorder, 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  Cardiac:  Functional status- METS 1.  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  She does note some intermittent exertional dyspnea with transfers. CAD, s/p inferior MI with COLLEEN to pRCA and mRCA 9/17/2017.  No recent nitro use.  On ASA. Was instructed with last surgery to continue her aspirin with no interruption prior to surgery which we have recommended again.  Ginger Cornelius, RN for Dr. Dawson notified.  Hypertension is managed with lisinopril and hctz; instructed to hold both the DOS.  Continue beta blocker as ordered.  Heart failure - EF 35-40% (12/2016)--> Yancy 10/2017 EF 80%.   PVD- s/p AKA left 6/2016 and right 11/2016. Status post stent to left carotid.  Carotid US less than 50% stenosis bilaterally.    Intermediate risk surgery with >11% risk of major adverse cardiac event.   2.  Pulm:  Airway feasible.  Has known sleep apnea and uses CPAP with 2lpm supplemental O2 nocturnally.  Is using her inhalers and nebulizers as ordered.  Quit smoking cigarettes about 16 years ago, but continues to vape tobacco.    3.  GI:  Risk of PONV score = 2.  If > 2, anti-emetic intervention recommended.  GERD is managed with protonix and carafate.  Hold carafate DOS.  Has a chronic history of dysphagia and strictures.  Has had multiple EGD procedures with dilation.  Has know gastroparesis - consider with intubation.  4.  Heme:  + sickle cell trait.  Taking iron for iron deficiency anemia.  Last Hgb was 12.6.  5. Endocrine:  Diabetes is managed with metformin.  Hold metformin DOS.  A1c 5.1.    6. Neuro - Seizure disorder - taking medications as ordered, but reports 4-5 petit mal seizures per day.  Last grand mal in 2005.   +poor memory.  History of CVA x 3.   +BPPV.   + peripheral neuropathy.    7. Musculoskeletal:  + chronic back pain.  Has an intrathecal pump and is on tylenol #3 for pain control.  Please see the PharmD notes below for recommendations.  Consider cautious positioning due to chronic pain and bilateral AKA.  Will require careful transfers.    8.  Psych:  Depression, anxiety and schizoaffective disorder - patient reports all are stable with current medications.    9. Eye:  Legally blind, but notes she can see some shadows.  +glaucoma.    VTE risk:  0.5%    Patient is optimized and is acceptable candidate for the proposed procedure.  No further diagnostic evaluation is needed.     Patient was discussed with Dr. Wyatt.                  Estela Arriaga DNP, RN, APRN  Preoperative Assessment Center  [facility]  Phone: [phone]  Fax: [phone]

## 2018-01-04 NOTE — ANESTHESIA PREPROCEDURE EVALUATION
"  Anesthesia Evaluation     . Pt has had prior anesthetic. Type: General, MAC and Regional    No history of anesthetic complications          ROS/MED HX    ENT/Pulmonary: Comment: [place] - uses \"Pocket talker\" to enhance hearing.      (+)sleep apnea, allergic rhinitis, vocal cord abnormalities- Horseness, tobacco use (Quit smoking cigarettes 16 years ago.  Smoked 1-2 PPD for 30 years.  Now vaping only.), Current use vaping only packs/day  Moderate Persistent asthma Last exacerbation: > 1year ago,Treatment: Inhaled steroids, Nebulizer daily and Inhaler prn,  moderate COPD, O2 dependent, during Nighttime 2L with CPAP liters/min,  uses CPAP on Oxygen 2L at night cmH2O , . .   : Wears corrective lenses.  Cannot see out of left eye.  Limited right eye vision with no peripheral vision. Legally blind.    Neurologic:     (+)neuropathy - hands, seizures last seizure:  Petit Mal 4-5  time per day.  Grand Mal - last 2005. features: \"stops talking and forgets what was said\", CVA (Cerebral vascular dz,s/p stent to left carotid artery 2005.) date: 2005, 2007 & 2008 with deficits-  left side weakness, peripheral vision loss, dysphagia   , other neuro Legally blind (no peripheral vision)  RLS. BPPV.  glaucoma. poor memory    Cardiovascular: Comment: PVD and thrombosis of LLE, s/p left AKA 6/6/2016   PVD RLE, s/p right AKA 11/23/2016.  Left carotid stent.  Carotid US 10/2017: < 50% bilat stenosis.       (+) Dyslipidemia, hypertension-Peripheral Vascular Disease-- Carotid Stenosis, CAD, -past MI (2005 and Inferior MI 9/2016.),-stent (s/p COLLEEN to pRCA and mRCA),9/2016   2 Drug Eluting Stent .. Taking blood thinners Pt has received instructions: Instructions Given to patient: stay on ASA. CHF (Plavix started September 2016 post stent placement.   ASA daily.) etiology: diastolic heart failure Last EF: 80%  date: 10/2017 . BEDOYA, . :. . Previous cardiac testing Echodate:12/22/2016results:Interpretation Summary  Left ventricular size is " normal.  The Ejection Fraction is estimated at 35-40%.  Inferior wall akinesis is present.  Septal,basal to mid anterior wall akinesis.  Right ventricular function, chamber size, wall motion, and thickness are  normal.  Compared to prior study,the wallmotion abnormality is new.Stress Testdate:10/2/2017 results:Impression  1.  Myocardial perfusion imaging using single isotope technique  demonstrated a small, basal inferior nontransmural infarction and a  very small area of mild ischemia involving the distal inferolateral  wall and apex.   2. Gated images demonstrated mild basal inferior hypokinesis.  The  left ventricular systolic function is normal, with an ejection  fraction measured at 80%.  3. No previous study available for comparisonECG reviewed date:12/5/2017 results:Sinus bradycardiaCath date: 9/17/2016 results:  Diagnosis  1.                   1-vessel coronary artery disease (RCA), without left main lesion.  2.                   Successful angioplasty (COLLEEN) of the proximal RCA.  3.                   Successful angioplasty (COLLEEN) of the mid RCA.         (-) angina, orthopnea/PND and angina   METS/Exercise Tolerance: Comment: Patient lives in assisted living.  Is able to dress herself, but does need help with showering. 1 - Eating, dressing   Hematologic:     (+) History of blood clots (Left LE  prior to AKA.) pt is not anticoagulated, Anemia, History of Transfusion no previous transfusion reaction Other Hematologic Disorder-Sickle cell trait      Musculoskeletal:   (+) arthritis (hands), , , other musculoskeletal- DDD, chronic low back pain.  intrathecal pump in place.        GI/Hepatic: Comment: Chronic dysphagia   Esophageal strictures and previous dilations.    (+) GERD Asymptomatic on medication, Other GI/Hepatic gastroparesis     Acute appendicitis: s/p appendectomy. Cholecystitis/cholelithiasis: s/p cholecystectomy.   Renal/Genitourinary:     (+) Other Renal/ Genitourinary, Frequent UTI's.  Functional  incontinence - wears depends.      Endo:     (+) type II DM Last HgA1c: 5.1 date: 11/10/2017 Not using insulin - not using insulin pump Normal glucose range: 140-200 not previously admitted for DM/DKA Diabetic complications: neuropathy gastroparesis cardiac problems, .      Psychiatric:     (+) psychiatric history anxiety, depression and other (comment) (Schizoaffective disorder)      Infectious Disease:  - neg infectious disease ROS       Malignancy:      - no malignancy   Other: Comment: Chronic pain from neuropathy and PVD  Intrathecal pump with bupivacaine and fentanyl located in right lower back.   (+) No chance of pregnancy C-spine cleared: N/A, H/O Chronic Pain,H/O chronic opiod use , other significant disability Wheelchair bound and Blind                   Physical Exam  Normal systems: cardiovascular and pulmonary    Airway   Mallampati: I  TM distance: >3 FB  Neck ROM: limited    Dental   (+) other  Comment: edentulous    Cardiovascular   Rhythm and rate: regular and normal      Pulmonary    breath sounds clear to auscultation    Other findings: Physical exam limited- patient in her wheelchair and wrapped in blankets and multiple layers of clothes.  Bilateral AKA           PAC Discussion and Assessment    ASA Classification: 3  Case is suitable for: [place]  Anesthetic techniques and relevant risks discussed: GA  Invasive monitoring and risk discussed:   Types:   Possibility and Risk of blood transfusion discussed:   NPO instructions given:   Additional anesthetic preparation and risks discussed:   Needs early admission to pre-op area:   Other:     PAC Resident/NP Anesthesia Assessment:  Sonya Foote is a 67 year old transgender female scheduled for a Cystoscopy and Suprapubic Tube Placement  on 1/16/2018 by Dr. Dawson in treatment of functional incontinence.  PAC referral for risk assessment and optimization for anesthesia with comorbid conditions of: CAD, s/p inferior MI with COLLEEN to pRCA & mRCA  9/17/2016, hypertension, hyperlipidemia, PVD, s/p bilateral AKA,  h/o LLE blood clot prior to amputation, sickle cell trait, iron deficiency anemia, h/o transfusion (hives associated with), JOSE, nocturnal hypoxemia, COPD, asthma, GERD, dysphagia with history of esophageal stricture s/p multiple dilations, gastroparesis, NIDDM, neuropahty; chronic low back pain (intrathecal pump in place), DDD, BPPV, seizure disorder, h/o CVA x 3, carotid stenosis s/p stent to left carotid; depression/anxiety, schizoaffective disorder, 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  Cardiac:  Functional status- METS 1.  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  She does note some intermittent exertional dyspnea with transfers. CAD, s/p inferior MI with COLLEEN to pRCA and mRCA 9/17/2017.  No recent nitro use.  On ASA. Was instructed with last surgery to continue her aspirin with no interruption prior to surgery which we have recommended again.  Ginger Cornelius RN for Dr. Dawson notified.  Hypertension is managed with lisinopril and hctz; instructed to hold both the DOS.  Continue beta blocker as ordered.  Heart failure - EF 35-40% (12/2016)--> Yancy 10/2017 EF 80%.   PVD- s/p AKA left 6/2016 and right 11/2016. Status post stent to left carotid.  Carotid US less than 50% stenosis bilaterally.    Intermediate risk surgery with >11% risk of major adverse cardiac event.   2.  Pulm:  Airway feasible.  Has known sleep apnea and uses CPAP with 2lpm supplemental O2 nocturnally.  Is using her inhalers and nebulizers as ordered.  Quit smoking cigarettes about 16 years ago, but continues to vape tobacco.    3.  GI:  Risk of PONV score = 2.  If > 2, anti-emetic intervention recommended.  GERD is managed with protonix and carafate.  Hold carafate DOS.  Has a chronic history of dysphagia and strictures.  Has had multiple EGD procedures with dilation.  Has know gastroparesis - consider with intubation.  4.  Heme:  +  sickle cell trait.  Taking iron for iron deficiency anemia.  Last Hgb was 12.6.  5. Endocrine:  Diabetes is managed with metformin.  Hold metformin DOS.  A1c 5.1.    6. Neuro - Seizure disorder - taking medications as ordered, but reports 4-5 petit mal seizures per day.  Last grand mal in 2005.  +poor memory.  History of CVA x 3.   +BPPV.   + peripheral neuropathy.    7. Musculoskeletal:  + chronic back pain.  Has an intrathecal pump and is on tylenol #3 for pain control.  Please see the PharmD notes below for recommendations.  Consider cautious positioning due to chronic pain and bilateral AKA.  Will require careful transfers.    8.  Psych:  Depression, anxiety and schizoaffective disorder - patient reports all are stable with current medications.    9. Eye:  Legally blind, but notes she can see some shadows.  +glaucoma.    VTE risk:  0.5%    Patient is optimized and is acceptable candidate for the proposed procedure.  No further diagnostic evaluation is needed.     Patient was discussed with Dr. Wyatt.      For further details of assessment, testing, and physical exam please see H and P completed on same date.            Estela Arriaga DNP, RN, APRN      Reviewed and Signed by PAC Mid-Level Provider/Resident  Mid-Level Provider/Resident: Estela Arriaga DNP, RN, APRN  Date: 1/4/2018  Time: 1726    Attending Anesthesiologist Anesthesia Assessment:  67 year old for cysto and suprapubic tube placement in management of incontinence. Chart reviewed, patient seen and evaluated; agree with above assessment. Patient has long standing cardiac history, with MI, CAD, prior COLLEEN to the RCA 9/2016; also with carotid disease and CVA X3, stent to left side. Bilateral LE amputations for PAD, legally blind. Pulmonary issues include COPD and asthma, smoker. Diabetes well controlled, but with neuropathy, gastroparesis.       Patient is appropriate for the planned procedure without further workup or medical management change. The  final anesthesia plan will be determined by the physician anesthesiologist caring for the patient on the day of surgery.    Reviewed and Signed by PAC Anesthesiologist  Anesthesiologist: halie  Date: 1/4/2018  Time:   Pass/Fail: Pass  Disposition:     PAC Pharmacist Assessment:        Pharmacist:   Date:   Time:      Anesthesia Plan      History & Physical Review  History and physical reviewed and following examination; no interval change.    ASA Status:  3 .    NPO Status:  > 8 hours    Plan for MAC with Intravenous and Propofol induction. Maintenance will be Balanced.  Reason for MAC:  Deep or markedly invasive procedure (G8)  PONV prophylaxis:  Ondansetron (or other 5HT-3) and Dexamethasone or Solumedrol       Postoperative Care  Postoperative pain management:  IV analgesics.      Consents  Anesthetic plan, risks, benefits and alternatives discussed with:  Patient..                          .

## 2018-01-04 NOTE — MR AVS SNAPSHOT
After Visit Summary   1/4/2018    Sonya Foote    MRN: [id number]           Patient Information     Date Of Birth          1950        Visit Information        Provider Department      1/4/2018 2:20 PM Nurse, [facility] for Prostate and Urologic Cancers        Today's Diagnoses     Pre-op exam    -  1       Follow-ups after your visit        Your next 10 appointments already scheduled     Abdi 10, 2018   Procedure with Herberth Castrejon MD   [facility] [place], Endoscopy ([facility], [facility])    [address] [address]   [phone]           The [facility] is located on the corner of [facility] [address] [facility] [address] and [facility] [address] [facility] on the [facility]. It is easily accessible from virtually any point in the [facility] [place] [facility] area, via [address] and [address].            Jan 16, 2018   Procedure with Keanu Dawson MD   [facility] [place], Same Day Surgery (--)    [address] [address] [phone]            Feb 08, 2018 11:30 AM CST   (Arrive by 11:15 AM)   Return Visit with VICTOR M Floyd [facility] ([place] [facility])    [address] [facility] [address] [phone]            Feb 08, 2018  1:00 PM CST   (Arrive by 12:45 PM)   Post-Op with Keanu Dawson MD   [facility] for Prostate and Urologic Cancers ([place] [facility])    [address] [facility] [address]   [phone]            Feb 19, 2018  9:30 AM CST   (Arrive by 9:15 AM)   Return Visit with Alina Jennings MD   [facility] ([place] [facility])    [address]  [address] [place] [address] [phone]            Mar 06, 2018  9:15 AM CST   VISUAL FIELD with [facility] EYE VISUAL FIELD   Eye Clinic  ([facility])    [address]  [address]  [facility] [address]   [phone]            Mar 06, 2018  9:45 AM CST   RETURN GLAUCOMA with Marely Robin MD   Eye Clinic ([facility])    [address]  [address]  [facility] [address]   [phone]            May 11, 2018 10:50 AM CDT   (Arrive by 10:35 AM)   RETURN DIABETES with Michelle Irizarry MD   [facility] End[facility]ology ([facility] [place])    [address]  3rd [address]  [facility] [address] [phone]              Who to contact     Please call your clinic at [phone] to:    Ask questions about your health    Make or cancel appointments    Discuss your medicines    Learn about your test results    Speak to your doctor   If you have compliments or concerns about an experience at your clinic, or if you wish to file a complaint, please contact [facility] Patient Relations at [phone] or email us at [email or web address]         Additional Information About Your Visit        [employer] Pattern Information     EverySignalt is an electronic gateway that provides easy, online access to your medical records. With OurHealthMate, you can request a clinic appointment, read your test results, renew a prescription or communicate with your care team.     To sign up for EverySignalt visit the website at [email or web address]   You will be asked to enter the access code listed below, as well as some personal information. Please follow the directions to create your username and password.     Your access code is: [email or web address]  Expires: 2018 11:09 AM     Your access code will  in 90 days. If you need help or a new code, please contact your [facility] or call [phone] for assistance.        Care EveryWhere ID     This is your Care EveryWhere ID. This could be used by other  organizations to access your [facility] medical records  [id number]         Blood Pressure from Last 3 Encounters:   01/04/18 96/65   12/29/17 124/51   12/12/17 128/70    Weight from Last 3 Encounters:   01/04/18 55.8 kg (123 lb)   12/29/17 62.1 kg (137 lb)   12/12/17 62.1 kg (137 lb)              We Performed the Following     Urine Culture Aerobic Bacterial          Today's Medication Changes          These changes are accurate as of: 1/4/18  2:48 PM.  If you have any questions, ask your nurse or doctor.               These medicines have changed or have updated prescriptions.        Dose/Directions    atorvastatin 40 MG tablet   Commonly known as:  LIPITOR   This may have changed:  when to take this   Used for:  Hyperlipidemia LDL goal <100        Dose:  40 mg   Take 1 tablet (40 mg) by mouth daily   Quantity:  90 tablet   Refills:  3       brimonidine 0.2 % ophthalmic solution   Commonly known as:  ALPHAGAN   This may have changed:  when to take this   Used for:  Primary open angle glaucoma of both eyes, severe stage        Dose:  1 drop   Place 1 drop into the right eye 3 times daily   Quantity:  15 mL   Refills:  11       hydrochlorothiazide 12.5 MG capsule   Commonly known as:  MICROZIDE   This may have changed:  additional instructions   Used for:  Essential hypertension with goal blood pressure less than 130/80        Dose:  12.5 mg   Take 1 capsule (12.5 mg) by mouth daily   Quantity:  90 capsule   Refills:  3       pantoprazole 40 MG EC tablet   Commonly known as:  PROTONIX   This may have changed:  when to take this   Used for:  Gastroesophageal reflux disease without esophagitis        Dose:  40 mg   Take 1 tablet (40 mg) by mouth 2 times daily   Quantity:  30 tablet   Refills:  1       Phenytoin Sodium Extended 200 MG Caps   This may have changed:  additional instructions   Used for:  Seizure disorder (H)        Dose:  200 mg   Take 200 mg by mouth 2 times daily   Quantity:  60 capsule   Refills:  0                 Primary Care Provider Office Phone # Fax #    Allan Casey [phone] [address] [place] [address]        Equal Access to Services     ROLANDAIRAJ VELMA : John jamison ku kenyattao Sostephali, waaxda luqadaha, qaybta kaalmada adeetta, tonie chaorossana cara. So [facility] [phone].    ATENCIÓN: Si habla español, tiene a briones disposición servicios gratuitos de asistencia lingüística. Llame al [phone].    We comply with applicable federal civil rights laws and [place] laws. We do not discriminate on the basis of race, color, national origin, age, disability, sex, sexual orientation, or gender identity.            Thank you!     Thank you for choosing [facility] UROLOGY AND [facility] FOR PROSTATE AND UROLOGIC CANCERS  for your care. Our goal is always to provide you with excellent care. Hearing back from our patients is one way we can continue to improve our services. Please take a few minutes to complete the written survey that you may receive in the mail after your visit with us. Thank you!             Your Updated Medication List - Protect others around you: Learn how to safely use, store and throw away your medicines at www.disposemymeds.org.          This list is accurate as of: 1/4/18  2:48 PM.  Always use your most recent med list.                   Brand Name Dispense Instructions for use Diagnosis    ACETAMINOPHEN PO      Take 1,000 mg by mouth every 4 hours as needed for fever Not to exceed 4000 mg/day        acetaminophen-codeine 300-30 MG per tablet    TYLENOL #3    20 tablet    Take 1 tablet by mouth every 4 hours as needed for pain maximum 3 tablet(s) per day    Tension headache       ADVAIR DISKUS 250-50 MCG/DOSE diskus inhaler   Generic drug:  fluticasone-salmeterol      Inhale 1 puff into the lungs 2 times daily        * albuterol (2.5 MG/3ML) 0.083% neb solution     360 mL    INHALE 1 VIAL VIA NEBULIZER EVERY 6 HOURS AS NEEDED    Chronic  obstructive pulmonary disease, unspecified COPD type (H)       * albuterol 108 (90 BASE) MCG/ACT Inhaler    PROAIR HFA/PROVENTIL HFA/VENTOLIN HFA    3 Inhaler    Inhale 2 puffs into the lungs every 6 hours as needed for shortness of breath / dyspnea or wheezing    JOSE (obstructive sleep apnea)       aspirin 81 MG EC tablet     90 tablet    Take 1 tablet (81 mg) by mouth daily    Unstable angina (H)       atorvastatin 40 MG tablet    LIPITOR    90 tablet    Take 1 tablet (40 mg) by mouth daily    Hyperlipidemia LDL goal <100       blood glucose monitoring lancets     100 each    Use to test blood sugar 3 times daily or as directed.    Type 2 diabetes mellitus with diabetic peripheral angiopathy without gangrene, without long-term current use of insulin (H)       blood glucose monitoring meter device kit     1 kit    Use to test blood sugars 3 times daily or as directed.    Type 2 diabetes, HbA1C goal < 8% (H)       blood glucose monitoring test strip    ONETOUCH ULTRA    3 Box    Use to test blood sugars 3 times daily or as directed.    Type 2 diabetes mellitus with diabetic peripheral angiopathy without gangrene, without long-term current use of insulin (H)       brimonidine 0.2 % ophthalmic solution    ALPHAGAN    15 mL    Place 1 drop into the right eye 3 times daily    Primary open angle glaucoma of both eyes, severe stage       calcium citrate-vitamin D 315-250 MG-UNIT Tabs per tablet    CITRACAL    120 tablet    Take 2 tablets by mouth daily    Osteoporosis       cetirizine 10 MG tablet    zyrTEC    90 tablet    Take 1 tablet (10 mg) by mouth daily as needed for allergies    Seasonal allergic rhinitis, unspecified allergic rhinitis trigger       COMPAZINE PO      Take 10 mg by mouth every 8 hours as needed for nausea        CYANOCOBALAMIN PO      Take 2,000 mcg by mouth daily        diclofenac 1 % Gel topical gel    VOLTAREN    100 g    Apply 2 g topically 4 times daily to hands    Primary osteoarthritis of  both hands       dorzolamide 2 % ophthalmic solution    TRUSOPT     Place 1 drop into the right eye 2 times daily        EPINEPHrine 0.15 MG/0.3ML injection 2-pack    EPIPEN JR    0.6 mL    Inject 0.3 mLs (0.15 mg) into the muscle as needed for anaphylaxis    Bee sting reaction, undetermined intent, subsequent encounter       escitalopram 5 MG tablet    LEXAPRO    60 tablet    Take 2 tablets (10 mg) by mouth daily    Adjustment disorder with depressed mood       ESTRACE VAGINAL 0.1 MG/GM cream   Generic drug:  estradiol     42.5 g    USE DIME SIZE AMOUNT 3X A WEEK AT NIGHT    Atrophic vaginitis       estradiol 1 MG tablet    ESTRACE    90 tablet    Take 1 tablet (1 mg) by mouth daily    Person who has had sex change operation       ferrous sulfate 325 (65 FE) MG tablet    IRON    60 tablet    Take 1 tablet (325 mg) by mouth 2 times daily With meals        fluticasone 50 MCG/ACT spray    FLONASE     Spray 1 spray into both nostrils daily        hydrochlorothiazide 12.5 MG capsule    MICROZIDE    90 capsule    Take 1 capsule (12.5 mg) by mouth daily    Essential hypertension with goal blood pressure less than 130/80       INCRUSE ELLIPTA 62.5 MCG/INH oral inhaler   Generic drug:  umeclidinium      Inhale 1 puff into the lungs daily        RENÉE Pack      Take 1 packet by mouth 2 times daily        lactulose 10 GM/15ML solution    CHRONULAC     Take 20 g by mouth as needed for constipation        latanoprost 0.005 % ophthalmic solution    XALATAN    2.5 mL    Place 1 drop into both eyes At Bedtime    Primary open angle glaucoma, stage unspecified       levETIRAcetam 500 MG tablet    KEPPRA    180 tablet    Take 1 tablet (500 mg) by mouth 2 times daily    Nausea       lidocaine 5 % Patch    LIDODERM    30 patch    APPLY 1 TO 3 PATCHES TO PAINFUL AREA AT ONCE FOR UP TO 12 HOURS WITHIN A 24 HOUR PERIOD REMOVE AFTER 12 HOURS    Chronic pain syndrome, 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 (H)        lisinopril 10 MG tablet    PRINIVIL/ZESTRIL    90 tablet    Take 1 tablet (10 mg) by mouth daily    Essential hypertension with goal blood pressure less than 130/80       MEDICATION GIVEN BY INTRATHECAL PUMP - INSTRUCTION      continuous January 4, 2018  - per [facility] [place] [phone]: Conc: Bupivacaine 20 mg/mL and Fentanyl 2000 mcg/mL, morphine 2 mg/mL Continuous:  Fentanyl 795.9 mcg/day, Bupivacaine 7.759 mg/day, Morphine 0.7959 mg/day  Boluses: Up to 7 boluses per 24-hr period of Bupivicaine 0.599 mg and Fentanyl 59.9 mcg morphine 0.0599 mg  Pump Refill Date at [facility]: 3/1/18        metFORMIN 500 MG 24 hr tablet    GLUCOPHAGE-XR    90 tablet    Take 1 tablet (500 mg) by mouth daily (with dinner)    Type 2 diabetes mellitus with diabetic peripheral angiopathy and gangrene, without long-term current use of insulin (H)       metoprolol 25 MG 24 hr tablet    TOPROL-XL    30 tablet    TAKE 1 TABLET (25 MG) BY MOUTH DAILY    Old myocardial infarction       MULTIVITAMIN PO      Take 1 tablet by mouth daily        nitroGLYcerin 0.4 MG sublingual tablet    NITROSTAT    25 tablet    Place 1 tablet (0.4 mg) under the tongue every 5 minutes as needed for chest pain if you are still having symptoms after 3 doses (15 minutes) call 911.    Chronic systolic congestive heart failure (H), Old myocardial infarction       ONDANSETRON PO      Take 4 mg by mouth 3 times daily        * order for DME     1 Month    Equipment being ordered: Depends briefs    Incontinence       * order for DME     1 Device    Equipment being ordered: Power Wheelchair    CVA (cerebral infarction), HTN (hypertension)       * order for DME     1 Box    Equipment being ordered: Glucerna daily shakes with each meal    Type 2 diabetes mellitus with other diabetic neurological complication       * order for DME     1 Units    Equipment being ordered: Nebulizer and tubing supplies    Simple chronic bronchitis (H)        * order for DME     1 Device    Equipment being ordered: CPAP supplies mask, hose, filters, cushion fax to [facility] at [phone] Disp: 10 DeviceRefills: prn Class: Local PrintStart: 2/10/2017    Chronic obstructive pulmonary disease, unspecified COPD type (H)       * order for DME     10 Device    Equipment being ordered: CPAP supplies mask, hose, filters, cushion  fax to [facility] at [phone]    COPD (chronic obstructive pulmonary disease) (H)       * order for DME     1 Device    Hospital bed with side rails    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 (H)       * order for DME     1 Device    Full electric hospital bed with half rails  Dx: G42804, I110, J449 Length of need: lifetime    Status post bilateral above knee amputation (H)       * order for DME     1 Device    Wheel Chair Cushion: 18 x 18 inch Roho cushion    Status post bilateral above knee amputation (H)       pantoprazole 40 MG EC tablet    PROTONIX    30 tablet    Take 1 tablet (40 mg) by mouth 2 times daily    Gastroesophageal reflux disease without esophagitis       Phenytoin Sodium Extended 200 MG Caps     60 capsule    Take 200 mg by mouth 2 times daily    Seizure disorder (H)       polyethylene glycol Packet    MIRALAX/GLYCOLAX     Take 17 g by mouth daily Dissolved in water or juice        pregabalin 75 MG capsule    LYRICA    120 capsule    Take 2 capsules (150 mg) by mouth 2 times daily    Pain in both upper extremities       risperiDONE 0.5 MG tablet    risperDAL     Take 0.5 mg by mouth At Bedtime        sucralfate 1 GM tablet    CARAFATE    120 tablet    Take 1 tablet (1 g) by mouth 4 times daily May dissolve in 10 mL water is necessary. (Start upon completion of carafate suspension)    Adjustment disorder with depressed mood       traZODone 100 MG tablet    DESYREL    90 tablet    Take 1.5 tablets (150 mg) by mouth At Bedtime    Anxiety state       vitamin D 2000 UNITS tablet     100 tablet     Take 2,000 Units by mouth daily.        zinc 50 MG Tabs      Take 1 tablet by mouth daily        * Notice:  This list has 11 medication(s) that are the same as other medications prescribed for you. Read the directions carefully, and ask your doctor or other care provider to review them with you.

## 2018-01-04 NOTE — PROGRESS NOTES
1/3/18: Cm received call from member stating that she is trying to schedule ride for pre-op appt tomorrow and another appt on 1/10 however MA is showing inactive so [employer] (specialty transport through [employer] [facility]) will not schedule.   CM confirmed [employer] is active through 3/31/18.  Cm placed call to [employer]  - spoke with Ali - she states it is in their policy to not provide rides when MA is inactive - explained [facility].  She tx Cm to Supervisor - Tiffanie.  Spoke with Tiffanie - she states they can provide transportation since member as [employer] [facility] - Tiffanie and CM made conference call to member - member scheduled rides.     Jamie Sharma RN, PHN  [employer]

## 2018-01-04 NOTE — PROGRESS NOTES
1/4/18: CM received call from member stating that she is unable to get her incontinence supplies b/c MA inactive.   Cm sent email to [employer] to f/u.     CM placed call to [facility] to f/u on MA status - spoke with Akash.  He states they received bank statement on 12/28  - they just have not processed.  He reviewed and reinstated MA.  New spend down will be $95.    CM placed call to member and relayed information to member.       Member states that she received call from [employer] saying they can ship out and will ship today.  CM also received email stating same thing from [employer].     CM placed call to [employer] as no call back re: roseline proof of purchase.  Cm spoke with Scott - he stated he found proof of purchase and would fax to CM.     Jamie Sharma RN, N  [employer]

## 2018-01-04 NOTE — PROGRESS NOTES
Sonya Foote 1950 Weighted bendable spoon, fork; Weighted cup  [employer] Delivered 12/29/17     Per [facility] items delivered. CM notified    Irene Gary  Case Management Specialist   [facility]   [phone]

## 2018-01-04 NOTE — PROGRESS NOTES
Sonya Foote comes into clinic today at the request of Dr. Keanu Dawson Ordering Provider for UA/UC cath specimen.    Patient was prepped and catheterized with a 14 Yi straight catheter, 15 ml of yellow urine was obtained.  Only enough to send for a UC.    This service provided today was under the supervising provider of the day Dr. Keanu Dawson, who was available if needed.    Lexi Johnson RN

## 2018-01-04 NOTE — MR AVS SNAPSHOT
After Visit Summary   2018    Sonya Foote    MRN: [id number]           Patient Information     Date Of Birth          1950        Visit Information        Provider Department      2018 2:00 PM Rn, [facility]        Care Instructions    Preparing for Your Surgery      Name:  Sonya Foote   MRN:  [id number]   :  1950   Today's Date:  2018     Arriving for surgery: Insert suprapubic tube   Surgery date:  2018  Arrival time:  11:00 am  Please come to:       [facility] [address]  [address] [place]  [address]    -   parking is available in front of the hospital from 5:15 am to 8:00 pm    -  Stop at the Information Desk in the lobby    -   Inform the information person that you are here for surgery. An escort to 3c will be provided. If you would not like an escort, please proceed to 3C on the 3rd floor. [phone]     What can I eat or drink?  -  You may have solid food or milk products until 8 hours prior to your surgery.  Nothing after 5 am   -  You may have water, apple juice or 7up/Sprite until 2 hours prior to your surgery.  Until 11 am arrival time     Which medicines can I take?  -  Do NOT take these medications in the morning, the day of surgery:     Lisinopril      Hydrochlorothiazide      Metformin      Sucralfate       Please hold usual vitamins and supplements on the day of surgery            -  Please take these medications the day of surgery:      Aspirin   Inhalers       Protonix (Pantoprazole)       escitalopram (lexapro)      Metoprolol       Pregabalin      Cetirizine  (Zyrtec)      Cyanocobalamin        Estradiol       Levetiracetam  (Keppra)       Phenytoin      How do I prepare myself?  -  Take two showers: one the night before surgery; and one the morning of surgery.         Use Scrubcare or Hibiclens to wash from neck down.  You may use your own shampoo and conditioner.  No other hair products.   -  Do NOT use lotion, powder, deodorant, or antiperspirant the day of your surgery.  -  Do NOT wear any makeup, fingernail polish or jewelry.  -Do not bring your own medications to the hospital, except for inhalers and eye drops.  -  Bring your ID and insurance card.    Questions or Concerns:  If you have questions or concerns, please call the  Preoperative Assessment Center, Monday-Friday 7AM-7PM:  [phone]                    Follow-ups after your visit        Your next 10 appointments already scheduled     Jan 04, 2018  2:00 PM CST   (Arrive by 1:45 PM)   PAC RN ASSESSMENT with  Pac Rn   [facility] ([facility])    [address] [place] [address]  [address] [facility] [address]   [phone]            Jan 04, 2018  2:30 PM CST   (Arrive by 2:15 PM)   PAC Anesthesia Consult with  Pac Anesthesiologist   [facility] Preoperative [facility] ([facility])    [address] [place] [address]  [address] [facility] [address]   [phone]            Jan 04, 2018  2:45 PM CST   LAB with  LAB   [facility] Lab ([facility])    [address] [place] [id number] [phone]           Please do not eat 10-12 hours before your appointment if you are coming in fasting for labs on lipids, cholesterol, or glucose (sugar). This does not apply to pregnant women. Water, hot tea and black coffee (with nothing added) are okay. Do not drink other fluids, diet soda or chew gum.            Abdi 10, 2018   Procedure with Herberth Castrejon MD   [facility] [place], Endoscopy ([facility])    [address] [facility] [address]   [phone]           The [facility] is located on the corner of [address] and [address] on the [facility]. It is easily accessible from virtually any point in the  [facility] area, via [address] and [address].            Jan 16, 2018   Procedure with Keanu Dawson MD   [facility] [place], Same Day Surgery (--)    [address]  [facility] [address]   [phone]            Feb 08, 2018 11:30 AM CST   (Arrive by 11:15 AM)   Return Visit with VICTOR M Floyd CNP   [facility]enterology [facility] ([facility])    [address]  4th Floor  [facility] [address] [phone]            Feb 08, 2018  1:00 PM CST   (Arrive by 12:45 PM)   Post-Op with Keanu Dawson MD   [facility]rology [facility] ([facility])    [address]  4th Floor  [facility] [address]   [phone]            Feb 19, 2018  9:30 AM CST   (Arrive by 9:15 AM)   Return Visit with Alina Jennings MD   [facility] ([facility])    [address]  [address]  [facility] [address]   [phone]            Mar 06, 2018  9:15 AM CST   VISUAL FIELD with [facility] EYE VISUAL FIELD   Eye Clinic ([facility])    [address]  [address]  [address]  [facility] [address]   [phone]            Mar 06, 2018  9:45 AM CST   RETURN GLAUCOMA with Marely Robin MD   Eye Clinic ([facility])    [address]  [address]  [address]  [facility] [address]   [phone]              Who to contact     Please call your clinic at [phone] to:    Ask questions about your health    Make or cancel appointments    Discuss your medicines    Learn about your test results    Speak to your doctor   If you have compliments or concerns about an experience at your clinic, or if you wish to file a complaint, please contact [facility] Patient Relations at [phone] or email us at [email or web address]         Additional Information About  Your Visit        MindSnacks Information     MindSnacks is an electronic gateway that provides easy, online access to your medical records. With MindSnacks, you can request a clinic appointment, read your test results, renew a prescription or communicate with your care team.     To sign up for MindSnacks visit the website at [email or web address]   You will be asked to enter the access code listed below, as well as some personal information. Please follow the directions to create your username and password.     Your access code is: [email or web address]  Expires: 2018 11:09 AM     Your access code will  in 90 days. If you need help or a new code, please contact your [facility] or call [phone] for assistance.        Care EveryWhere ID     This is your Care EveryWhere ID. This could be used by other organizations to access your [facility] medical records  [id number]         Blood Pressure from Last 3 Encounters:   18 96/65   17 124/51   17 128/70    Weight from Last 3 Encounters:   18 55.8 kg (123 lb)   17 62.1 kg (137 lb)   17 62.1 kg (137 lb)              Today, you had the following     No orders found for display         Today's Medication Changes          These changes are accurate as of: 18  1:15 PM.  If you have any questions, ask your nurse or doctor.               These medicines have changed or have updated prescriptions.        Dose/Directions    atorvastatin 40 MG tablet   Commonly known as:  LIPITOR   This may have changed:  when to take this   Used for:  Hyperlipidemia LDL goal <100        Dose:  40 mg   Take 1 tablet (40 mg) by mouth daily   Quantity:  90 tablet   Refills:  3       brimonidine 0.2 % ophthalmic solution   Commonly known as:  ALPHAGAN   This may have changed:  when to take this   Used for:  Primary open angle glaucoma of both eyes, severe stage        Dose:  1 drop   Place 1 drop into the right eye 3 times daily    Quantity:  15 mL   Refills:  11       hydrochlorothiazide 12.5 MG capsule   Commonly known as:  MICROZIDE   This may have changed:  additional instructions   Used for:  Essential hypertension with goal blood pressure less than 130/80        Dose:  12.5 mg   Take 1 capsule (12.5 mg) by mouth daily   Quantity:  90 capsule   Refills:  3       Phenytoin Sodium Extended 200 MG Caps   This may have changed:  additional instructions   Used for:  Seizure disorder (H)        Dose:  200 mg   Take 200 mg by mouth 2 times daily   Quantity:  60 capsule   Refills:  0                Primary Care Provider Office Phone # Fax #    Allan Casey [phone]       [address] [facility] [address]        Equal Access to Services     KARI CARREON : John Cohen, wawilly glez, qakwabena kaalmada shashank, tonie marroquin. So [facility] [phone].    ATENCIÓN: Si habla español, tiene a briones disposición servicios gratuitos de asistencia lingüística. Llame al [phone].    We comply with applicable federal civil rights laws and [place] laws. We do not discriminate on the basis of race, color, national origin, age, disability, sex, sexual orientation, or gender identity.            Thank you!     Thank you for choosing [facility]OPER[facility]  for your care. Our goal is always to provide you with excellent care. Hearing back from our patients is one way we can continue to improve our services. Please take a few minutes to complete the written survey that you may receive in the mail after your visit with us. Thank you!             Your Updated Medication List - Protect others around you: Learn how to safely use, store and throw away your medicines at www.disposemymeds.org.          This list is accurate as of: 1/4/18  1:15 PM.  Always use your most recent med list.                   Brand Name Dispense Instructions for use Diagnosis    ACETAMINOPHEN PO       Take 1,000 mg by mouth every 4 hours as needed for fever Not to exceed 4000 mg/day        acetaminophen-codeine 300-30 MG per tablet    TYLENOL #3    20 tablet    Take 1 tablet by mouth every 4 hours as needed for pain maximum 3 tablet(s) per day    Tension headache       ADVAIR DISKUS 250-50 MCG/DOSE diskus inhaler   Generic drug:  fluticasone-salmeterol      Inhale 1 puff into the lungs 2 times daily        * albuterol (2.5 MG/3ML) 0.083% neb solution     360 mL    INHALE 1 VIAL VIA NEBULIZER EVERY 6 HOURS AS NEEDED    Chronic obstructive pulmonary disease, unspecified COPD type (H)       * albuterol 108 (90 BASE) MCG/ACT Inhaler    PROAIR HFA/PROVENTIL HFA/VENTOLIN HFA    3 Inhaler    Inhale 2 puffs into the lungs every 6 hours as needed for shortness of breath / dyspnea or wheezing    JOSE (obstructive sleep apnea)       aspirin 81 MG EC tablet     90 tablet    Take 1 tablet (81 mg) by mouth daily    Unstable angina (H)       atorvastatin 40 MG tablet    LIPITOR    90 tablet    Take 1 tablet (40 mg) by mouth daily    Hyperlipidemia LDL goal <100       blood glucose monitoring lancets     100 each    Use to test blood sugar 3 times daily or as directed.    Type 2 diabetes mellitus with diabetic peripheral angiopathy without gangrene, without long-term current use of insulin (H)       blood glucose monitoring meter device kit     1 kit    Use to test blood sugars 3 times daily or as directed.    Type 2 diabetes, HbA1C goal < 8% (H)       blood glucose monitoring test strip    ONETOUCH ULTRA    3 Box    Use to test blood sugars 3 times daily or as directed.    Type 2 diabetes mellitus with diabetic peripheral angiopathy without gangrene, without long-term current use of insulin (H)       brimonidine 0.2 % ophthalmic solution    ALPHAGAN    15 mL    Place 1 drop into the right eye 3 times daily    Primary open angle glaucoma of both eyes, severe stage       calcium citrate-vitamin D 315-250 MG-UNIT Tabs per tablet     CITRACAL    120 tablet    Take 2 tablets by mouth daily    Osteoporosis       cetirizine 10 MG tablet    zyrTEC    90 tablet    Take 1 tablet (10 mg) by mouth daily as needed for allergies    Seasonal allergic rhinitis, unspecified allergic rhinitis trigger       CYANOCOBALAMIN PO      Take 2,000 mcg by mouth daily        cyclobenzaprine 10 MG tablet    FLEXERIL    30 tablet    Take 1 tablet (10 mg) by mouth 2 times daily as needed for muscle spasms    Cervicalgia       diclofenac 1 % Gel topical gel    VOLTAREN    100 g    Apply 2 g topically 4 times daily to hands    Primary osteoarthritis of both hands       dorzolamide 2 % ophthalmic solution    TRUSOPT     Place 1 drop into the right eye 2 times daily        dorzolamide-timolol 2-0.5 % ophthalmic solution    COSOPT    1 Bottle    Place 1 drop into the right eye 2 times daily    Primary open angle glaucoma of both eyes, severe stage       EPINEPHrine 0.15 MG/0.3ML injection 2-pack    EPIPEN JR    0.6 mL    Inject 0.3 mLs (0.15 mg) into the muscle as needed for anaphylaxis    Bee sting reaction, undetermined intent, subsequent encounter       escitalopram 5 MG tablet    LEXAPRO    60 tablet    Take 2 tablets (10 mg) by mouth daily    Adjustment disorder with depressed mood       ESTRACE VAGINAL 0.1 MG/GM cream   Generic drug:  estradiol     42.5 g    USE DIME SIZE AMOUNT 3X A WEEK AT NIGHT    Atrophic vaginitis       estradiol 1 MG tablet    ESTRACE    90 tablet    Take 1 tablet (1 mg) by mouth daily    Person who has had sex change operation       ferrous sulfate 325 (65 FE) MG tablet    IRON    60 tablet    Take 1 tablet (325 mg) by mouth 2 times daily With meals        fluticasone 50 MCG/ACT spray    FLONASE     Spray 1 spray into both nostrils daily        hydrochlorothiazide 12.5 MG capsule    MICROZIDE    90 capsule    Take 1 capsule (12.5 mg) by mouth daily    Essential hypertension with goal blood pressure less than 130/80       INCRUSE ELLIPTA 62.5  MCG/INH oral inhaler   Generic drug:  umeclidinium      Inhale 1 puff into the lungs daily        RENÉE Pack      Take 1 packet by mouth 2 times daily        latanoprost 0.005 % ophthalmic solution    XALATAN    2.5 mL    Place 1 drop into both eyes At Bedtime    Primary open angle glaucoma, stage unspecified       levETIRAcetam 500 MG tablet    KEPPRA    180 tablet    Take 1 tablet (500 mg) by mouth 2 times daily    Nausea       lidocaine 5 % Patch    LIDODERM    30 patch    APPLY 1 TO 3 PATCHES TO PAINFUL AREA AT ONCE FOR UP TO 12 HOURS WITHIN A 24 HOUR PERIOD REMOVE AFTER 12 HOURS    Chronic pain syndrome, 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 (H)       lisinopril 10 MG tablet    PRINIVIL/ZESTRIL    90 tablet    Take 1 tablet (10 mg) by mouth daily    Essential hypertension with goal blood pressure less than 130/80       MEDICATION GIVEN BY INTRATHECAL PUMP - INSTRUCTION      continuous May 19, 2017 - per [facility] [place] [phone]: Conc: Bupivacaine 20 mg/mL and Fentanyl 2000 mcg/mL. Continuous: Bupivacaine 7.265 mg/day and Fentanyl 726.5 mcg/day. Boluses: Up to 7 boluses per 24-hr period of Bupivicaine 0.599 mg and Fentanyl 59.9 mcg  Pump Refill Date at [facility]: 7/2/17        metFORMIN 500 MG 24 hr tablet    GLUCOPHAGE-XR    90 tablet    Take 1 tablet (500 mg) by mouth daily (with dinner)    Type 2 diabetes mellitus with diabetic peripheral angiopathy and gangrene, without long-term current use of insulin (H)       metoprolol 25 MG 24 hr tablet    TOPROL-XL    30 tablet    TAKE 1 TABLET (25 MG) BY MOUTH DAILY    Old myocardial infarction       MULTIVITAMIN PO      Take 1 tablet by mouth daily        nitroGLYcerin 0.4 MG sublingual tablet    NITROSTAT    25 tablet    Place 1 tablet (0.4 mg) under the tongue every 5 minutes as needed for chest pain if you are still having symptoms after 3 doses (15 minutes) call 911.    Chronic systolic congestive  heart failure (H), Old myocardial infarction       ONDANSETRON PO      Take 4 mg by mouth 3 times daily        * order for DME     1 Month    Equipment being ordered: Depends briefs    Incontinence       * order for DME     1 Device    Equipment being ordered: Power Wheelchair    CVA (cerebral infarction), HTN (hypertension)       * order for DME     1 Box    Equipment being ordered: Glucerna daily shakes with each meal    Type 2 diabetes mellitus with other diabetic neurological complication       * order for DME     1 Units    Equipment being ordered: Nebulizer and tubing supplies    Simple chronic bronchitis (H)       * order for DME     1 Device    Equipment being ordered: CPAP supplies mask, hose, filters, cushion fax to [facility] at [phone] Disp: 10 DeviceRefills: prn Class: Local PrintStart: 2/10/2017    Chronic obstructive pulmonary disease, unspecified COPD type (H)       * order for DME     10 Device    Equipment being ordered: CPAP supplies mask, hose, filters, cushion  fax to [facility] at [phone]    COPD (chronic obstructive pulmonary disease) (H)       * order for DME     1 Device    Hospital bed with side rails    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 (H)       * order for DME     1 Device    Full electric hospital bed with half rails  Dx: Q02109, I110, J449 Length of need: lifetime    Status post bilateral above knee amputation (H)       * order for DME     1 Device    Wheel Chair Cushion: 18 x 18 inch Roho cushion    Status post bilateral above knee amputation (H)       pantoprazole 40 MG EC tablet    PROTONIX    30 tablet    Take 1 tablet (40 mg) by mouth 2 times daily    Gastroesophageal reflux disease without esophagitis       Phenytoin Sodium Extended 200 MG Caps     60 capsule    Take 200 mg by mouth 2 times daily    Seizure disorder (H)       polyethylene glycol Packet    MIRALAX/GLYCOLAX     Take 17 g by mouth daily as needed Dissolved in water or juice         pregabalin 75 MG capsule    LYRICA    120 capsule    Take 2 capsules (150 mg) by mouth 2 times daily    Pain in both upper extremities       risperiDONE 0.5 MG tablet    risperDAL     Take 0.5 mg by mouth At Bedtime        sucralfate 1 GM tablet    CARAFATE    120 tablet    Take 1 tablet (1 g) by mouth 4 times daily May dissolve in 10 mL water is necessary. (Start upon completion of carafate suspension)    Adjustment disorder with depressed mood       traZODone 100 MG tablet    DESYREL    90 tablet    Take 1.5 tablets (150 mg) by mouth At Bedtime    Anxiety state       vitamin D 2000 UNITS tablet     100 tablet    Take 2,000 Units by mouth daily.        zinc 50 MG Tabs      Take 1 tablet by mouth daily        * Notice:  This list has 11 medication(s) that are the same as other medications prescribed for you. Read the directions carefully, and ask your doctor or other care provider to review them with you.

## 2018-01-05 ENCOUNTER — CARE COORDINATION (OUTPATIENT)
Dept: GERIATRIC MEDICINE | Facility: CLINIC | Age: 68
End: 2018-01-05

## 2018-01-05 DIAGNOSIS — Z76.89 HEALTH CARE HOME: Chronic | ICD-10-CM

## 2018-01-05 LAB
BACTERIA SPEC CULT: ABNORMAL
SPECIMEN SOURCE: ABNORMAL

## 2018-01-05 NOTE — PROGRESS NOTES
1/5/17: ELVIRA confirmed with member that she received her equipment from [facility] - she confirmed and stated working well.       No fax yet received from [employer] for proof of purchase of roseline irizarry.     Jamie Sharma RN, N  [facility]

## 2018-01-05 NOTE — PROGRESS NOTES
1/5/17: Member contacted ELVIRA regarding wipes from [facility].  She states that she goes through several per day - she is wondering max amount she can receive.  ELVIRA sent email to [facility] to order and verify quantity.     Jamie Sharma RN, PHN  [facility]

## 2018-01-08 NOTE — PROGRESS NOTES
1/8/17: CM will order 5 pkg's wipes per month to start with. $4.28/pack.   CPS and POC updated.     CM has not received fax of proof of purchase from [employer] yet at this time.  CM placed another call - left .     Received call back from Howard at [employer] - he will refax the proof of purchase.  Howard provided CM with direct # for rehab dept - [phone].    CM received Proof of Purchase - emailed securely to [email or web address] at [email or web address]    Jamie Sharma RN, N  [facility]

## 2018-01-09 ENCOUNTER — CARE COORDINATION (OUTPATIENT)
Dept: GERIATRIC MEDICINE | Facility: CLINIC | Age: 68
End: 2018-01-09

## 2018-01-09 ENCOUNTER — CARE COORDINATION (OUTPATIENT)
Dept: UROLOGY | Facility: CLINIC | Age: 68
End: 2018-01-09

## 2018-01-09 DIAGNOSIS — N39.0 URINARY TRACT INFECTION: Primary | ICD-10-CM

## 2018-01-09 RX ORDER — CIPROFLOXACIN 500 MG/1
500 TABLET, FILM COATED ORAL 2 TIMES DAILY
Qty: 14 TABLET | Refills: 0 | Status: ON HOLD | OUTPATIENT
Start: 2018-01-09 | End: 2018-01-16

## 2018-01-09 NOTE — PROGRESS NOTES
Pre Op Teaching Flowsheet       Pre and Post op Patient Education  Relevant Diagnosis:  Functional incontinence  Surgical procedure:  cystoscopy, suprapubic tube placement  Teaching Topic:  Pre and post op teaching  Person Involved in teaching: Sonya Foote    Motivation Level:  Asks Questions: Yes  Eager to Learn:  Yes  Cooperative: Yes  Receptive (willing/able to accept information):  Yes    Patient demonstrates understanding of the following:  Date of surgery:  1/16/18  Location of surgery:  [address] [place] [address]  History and Physical and any other testing necessary prior to surgery: Yes  Required time line for completion of History and Physical and any pre-op testing: Yes    Patient demonstrates understanding of the following:  Pre-op bowel prep:  None needed  Pre-op showering/scrub information with PCMX Soap: Yes  Blood thinner medications discussed and when to stop (if applicable):  Yes      Infection Prevention:   Patient demonstrates understanding of the following:  Surgical procedure site care taught: at time of discharge  Signs and symptoms of infection taught:  Yes      Post-op follow-up:  Discussed how to contact the hospital, nurse, and clinic scheduling staff if necessary.    Instructional materials used/given/mailed:  [facility] Booklet, post op teaching sheet, Map, Soap, and arrival/location information.    Surgical instructions packet mailed to patient. Patient being treated for UTI.

## 2018-01-10 ENCOUNTER — SURGERY (OUTPATIENT)
Age: 68
End: 2018-01-10

## 2018-01-10 ENCOUNTER — HOSPITAL ENCOUNTER (OUTPATIENT)
Facility: CLINIC | Age: 68
Discharge: HOME | End: 2018-01-10
Attending: INTERNAL MEDICINE | Admitting: INTERNAL MEDICINE
Payer: COMMERCIAL

## 2018-01-10 VITALS
SYSTOLIC BLOOD PRESSURE: 130 MMHG | DIASTOLIC BLOOD PRESSURE: 71 MMHG | RESPIRATION RATE: 14 BRPM | OXYGEN SATURATION: 96 %

## 2018-01-10 LAB
GLUCOSE BLDC GLUCOMTR-MCNC: 103 MG/DL (ref 70–99)
UPPER GI ENDOSCOPY: NORMAL

## 2018-01-10 PROCEDURE — 88305 TISSUE EXAM BY PATHOLOGIST: CPT | Performed by: INTERNAL MEDICINE

## 2018-01-10 PROCEDURE — 82962 GLUCOSE BLOOD TEST: CPT

## 2018-01-10 PROCEDURE — 99153 MOD SED SAME PHYS/QHP EA: CPT | Performed by: INTERNAL MEDICINE

## 2018-01-10 PROCEDURE — 43249 ESOPH EGD DILATION <30 MM: CPT | Performed by: INTERNAL MEDICINE

## 2018-01-10 PROCEDURE — 40000141 ZZH STATISTIC PERIPHERAL IV START W/O US GUIDANCE

## 2018-01-10 PROCEDURE — C1726 CATH, BAL DIL, NON-VASCULAR: HCPCS | Performed by: INTERNAL MEDICINE

## 2018-01-10 PROCEDURE — G0500 MOD SEDAT ENDO SERVICE >5YRS: HCPCS | Performed by: INTERNAL MEDICINE

## 2018-01-10 PROCEDURE — 25000128 H RX IP 250 OP 636: Performed by: INTERNAL MEDICINE

## 2018-01-10 RX ORDER — FENTANYL CITRATE 50 UG/ML
INJECTION, SOLUTION INTRAMUSCULAR; INTRAVENOUS PRN
Status: DISCONTINUED | OUTPATIENT
Start: 2018-01-10 | End: 2018-01-10 | Stop reason: HOSPADM

## 2018-01-10 RX ORDER — DIPHENHYDRAMINE HYDROCHLORIDE 50 MG/ML
INJECTION INTRAMUSCULAR; INTRAVENOUS PRN
Status: DISCONTINUED | OUTPATIENT
Start: 2018-01-10 | End: 2018-01-10 | Stop reason: HOSPADM

## 2018-01-10 RX ADMIN — DIPHENHYDRAMINE HYDROCHLORIDE 50 MG: 50 INJECTION, SOLUTION INTRAMUSCULAR; INTRAVENOUS at 10:38

## 2018-01-10 RX ADMIN — MIDAZOLAM 1 MG: 1 INJECTION INTRAMUSCULAR; INTRAVENOUS at 10:53

## 2018-01-10 RX ADMIN — MIDAZOLAM 2 MG: 1 INJECTION INTRAMUSCULAR; INTRAVENOUS at 10:39

## 2018-01-10 RX ADMIN — FENTANYL CITRATE 50 MCG: 50 INJECTION, SOLUTION INTRAMUSCULAR; INTRAVENOUS at 10:39

## 2018-01-10 NOTE — LETTER
"    Patient:  Sharath Mercedes  :   1950  MRN:     [id number]        Ms.Gail Mercedes  [address]  [facility] [address]        2018    Dear ,    We are writing to inform you of your test results.    The biopsies returned with some evidence of inflammation.  There was a note of some candida (a fungus) causing some inflammation.  I recommend treating this with a two week course of fluconazole.  This has been sent to your pharmacy.  If you have any questions, please don't hesitate to contact the Gastroenterology nurse at [phone].     Viola Castrejon MD    [facility]  Inflammatory Bowel Disease Program  Division of Gastroenterology, Hepatology and Nutrition        Resulted Orders   Surgical pathology exam   Result Value Ref Range    Copath Report       Patient Name: SHARATH MERCEDES  MR#: [id number]  Specimen #:   Collected: 1/10/2018  Received: 1/10/2018  Reported: 2018 17:44  Ordering Phy(s): VIOLA CASTREJON    For improved result formatting, select 'View Enhanced Report Format' under   Linked Documents section.    SPECIMEN(S):  Esophagus, middle biopsy    FINAL DIAGNOSIS:  ESOPHAGUS, MIDDLE, BIOPSY:  - Squamous mucosa with candida esophagitis and increased eosinophils   (3-4/hpf)  - Negative for dysplasia  - See microscopic description    I have personally reviewed all specimens and/or slides, including the   listed special stains, and used them  with my medical judgement to determine or confirm the final diagnosis.    Electronically signed out by:    Rajan Dubon M.D., [facility]    CLINICAL HISTORY:  67-year-old male with history of food impaction. EGD to assess for   underlying peptic stricture or eosinophilic  esophagitis.  GROSS:  The specimen is received in formalin with proper patient identification,   labele d \"esophagus, middle assess for  EOE\".  The specimen consists of four white soft tissue fragments 0.2-0.4   cm " in greatest dimension.  The  specimen is wrapped and entirely submitted in cassette A1. (Dictated by:   Radha Pate [facility] 1/10/2018 05:01  PM)    MICROSCOPIC:  Examination reveals multiple fragments of esophageal squamous mucosa along   with a fragment of squamous debris  containing numerous fungal organisms consistent with candida. There are   numerous intraepithelial lymphocytes,  neutrophils and occasional eosinophils although the number of eosinophils   is relatively small and never  numbers more than 3-4 /HPF. Therefore, I suspect that this inflammation is   related to the fungal infection and  does not represent eosinophilic esophagitis..  >    CPT Codes:  A: 61931-ZA8    TESTING LAB LOCATION:  [facility], [address]   [address]  [phone]    COLLECTION SITE:  Client: [facility]  Location: ESTRELLA ENGLE)

## 2018-01-10 NOTE — OR NURSING
Pt tolerated EGD with biopsy and esophageal dilitation, very well. 2 separate dilators were used. 12-13.5-15 and 15-16.5-18 TTS , both used . Held each increment for 60 seconds..

## 2018-01-10 NOTE — DISCHARGE INSTRUCTIONS
Discharge Instructions after  Upper Endoscopy (EGD)    Activity and Diet  You were given medicine for pain. You may be dizzy or sleepy.  For 24 hours:    Do not drive or use heavy equipment.    Do not make important decisions.    Do not drink any alcohol.    Discomfort  You may have a sore throat for 2 to 3 days. It may help to:    Avoid hot liquids for 24 hours.    Use sore throat lozenges.    Gargle as needed with salt water up to 4 times a day. Mix 1 cup of warm water  with 1 teaspoon of salt. Do not swallow.  __x_ Your esophagus was dilated (opened) or banded during the exam:    Drink only cool liquids for the rest of the day. Eat a soft diet for the next few days.    You may have a sore chest for 2 to 3 days.    You may take Tylenol (acetaminophen) for pain unless your doctor has told you not to.    Do not take aspirin or ibuprofen (Advil, Motrin) or other NSAIDS  (anti-inflammatory drugs) for __5_ days.        Other instructions________________________________________________________    When to call us:  Problems are rare. Call right away if you have:    Unusual throat pain or trouble swallowing    Unusual pain in belly or chest that is not relieved by belching or passing air    Black stools (tar-like looking bowel movement)    Temperature above 100.6  F. (37.5  C).    If you vomit blood or have severe pain, go to an emergency room.    If you have questions, call:  Monday to Friday, 7 a.m. to 4:30 p.m.: Endoscopy: [phone] (We may have to call you back)    After hours: Hospital: [phone] (Ask for the GI fellow on call)

## 2018-01-10 NOTE — PROGRESS NOTES
1/10/18: CM placed call to [employer] at [phone] re: roseline lloydion - spoke with Shae.  She states that they will send member a replacement cushion.  Member is to keep pump and cover - this is very important per Shae.  Shae states that there will be a paid label included.  Member is to return old cushion sans cover and pump.   Shae states very important for member to return cushion or member will be ineligible for new cushion.  Warranty is good for 2 years on current cushion - ends 7/20/19.    Conf #: [id number].     CM will notify member.    CM also notified ULISES Shine with [facility].     Jamie Sharma RN, N  [employer]

## 2018-01-10 NOTE — IP AVS SNAPSHOT
[facility], [place], Endoscopy    [address] [address]    Phone:  [phone]                                       After Visit Summary   1/10/2018    Sonya Foote    MRN: [id number]           After Visit Summary Signature Page     I have received my discharge instructions, and my questions have been answered. I have discussed any challenges I see with this plan with the nurse or doctor.    ..........................................................................................................................................  Patient/Patient Representative Signature      ..........................................................................................................................................  Patient Representative Print Name and Relationship to Patient    ..................................................               ................................................  Date                                            Time    ..........................................................................................................................................  Reviewed by Signature/Title    ...................................................              ..............................................  Date                                                            Time

## 2018-01-10 NOTE — IP AVS SNAPSHOT
MRN:[id number]                      After Visit Summary   1/10/2018    Sonya Foote    MRN: [id number]           Thank you!     Thank you for choosing [facility] for your care. Our goal is always to provide you with excellent care. Hearing back from our patients is one way we can continue to improve our services. Please take a few minutes to complete the written survey that you may receive in the mail after you visit with us. Thank you!        Patient Information     Date Of Birth          1950        About your hospital stay     You were admitted on:  January 10, 2018 You last received care in the:  [facility], Endoscopy    You were discharged on:  January 10, 2018       Who to Call     For medical emergencies, please call 911.  For non-urgent questions about your medical care, please call your primary care provider or clinic, [phone]  For questions related to your surgery, please call your surgery clinic        Attending Provider     Provider Specialty    Herberth Castrejon MD Gastroenterology       Primary Care Provider Office Phone # Fax #    Allan Casey [phone]      Your next 10 appointments already scheduled     Jan 16, 2018   Procedure with Keanu Dawson MD   [facility], Same Day Surgery (--)    [address] [facility] [address] [phone]            Feb 08, 2018 11:30 AM CST   (Arrive by 11:15 AM)   Return Visit with VICTOR M Floyd [facility] ([facility])    [address] [place] [address] [phone]            Feb 08, 2018  1:00 PM CST   (Arrive by 12:45 PM)   Post-Op with Keanu Dawson MD   [facility] ([facility])    [address] [place] [address] [phone]            Feb 19, 2018  9:30 AM CST   (Arrive by 9:15 AM)    Return Visit with Alina Jennings MD   [facility] ([facility])    [address]  [address]  [facility] [address]   [phone]            Mar 06, 2018  9:15 AM CST   VISUAL FIELD with [facility] EYE VISUAL FIELD   Eye Clinic ([facility])    [address]  [facility] [address]   [phone]            Mar 06, 2018  9:45 AM CST   RETURN GLAUCOMA with Marely Robin MD   Eye Clinic ([facility])    [address]  [address]  [address]  [facility] [address]   [phone]            May 11, 2018 10:50 AM CDT   (Arrive by 10:35 AM)   RETURN DIABETES with Michelle Irizarry MD   [facility] ([facility] [place])    [address]  3rd Floor  [facility] [address] [phone]              Further instructions from your care team       Discharge Instructions after  Upper Endoscopy (EGD)    Activity and Diet  You were given medicine for pain. You may be dizzy or sleepy.  For 24 hours:    Do not drive or use heavy equipment.    Do not make important decisions.    Do not drink any alcohol.    Discomfort  You may have a sore throat for 2 to 3 days. It may help to:    Avoid hot liquids for 24 hours.    Use sore throat lozenges.    Gargle as needed with salt water up to 4 times a day. Mix 1 cup of warm water  with 1 teaspoon of salt. Do not swallow.  __x_ Your esophagus was dilated (opened) or banded during the exam:    Drink only cool liquids for the rest of the day. Eat a soft diet for the next few days.    You may have a sore chest for 2 to 3 days.    You may take Tylenol (acetaminophen) for pain unless your doctor has told you not to.    Do not take aspirin or ibuprofen (Advil, Motrin) or other NSAIDS  (anti-inflammatory drugs) for __5_ days.        Other  "instructions________________________________________________________    When to call us:  Problems are rare. Call right away if you have:    Unusual throat pain or trouble swallowing    Unusual pain in belly or chest that is not relieved by belching or passing air    Black stools (tar-like looking bowel movement)    Temperature above 100.6  F. (37.5  C).    If you vomit blood or have severe pain, go to an emergency room.    If you have questions, call:  Monday to Friday, 7 a.m. to 4:30 p.m.: Endoscopy: [phone] (We may have to call you back)    After hours: Hospital: [phone] (Ask for the GI fellow on call)    Pending Results     No orders found from 2018 to 2018.            Admission Information     Date & Time Provider Department Dept. Phone    1/10/2018 Herberth Castrejon MD [facility], [place], Endoscopy [phone]      Your Vitals Were     Blood Pressure Respirations Pulse Oximetry             134/73 14 98%         MyChart Information     [employer] lets you send messages to your doctor, view your test results, renew your prescriptions, schedule appointments and more. To sign up, go to [email or web address] . Click on \"Log in\" on the left side of the screen, which will take you to the Welcome page. Then click on \"Sign up Now\" on the right side of the page.     You will be asked to enter the access code listed below, as well as some personal information. Please follow the directions to create your username and password.     Your access code is: [email or web address]  Expires: 2018 11:09 AM     Your access code will  in 90 days. If you need help or a new code, please call your [place] clinic or [phone].        Care EveryWhere ID     This is your Care EveryWhere ID. This could be used by other organizations to access your [place] medical records  [id number]        Equal Access to Services     KARI CARREON : John Cohen, reji glez, tonie gee " francis bryanlilia babin'aan ah. So [facility] [phone].    ATENCIÓN: Si radhala agatha, tiene a briones disposición servicios gratuitos de asistencia lingüística. Meño al [phone].    We comply with applicable federal civil rights laws and [place] laws. We do not discriminate on the basis of race, color, national origin, age, disability, sex, sexual orientation, or gender identity.               Review of your medicines      UNREVIEWED medicines. Ask your doctor about these medicines        Dose / Directions    ACETAMINOPHEN PO        Dose:  1000 mg   Take 1,000 mg by mouth every 4 hours as needed for fever Not to exceed 4000 mg/day   Refills:  0       acetaminophen-codeine 300-30 MG per tablet   Commonly known as:  TYLENOL #3   Used for:  Tension headache        Dose:  1 tablet   Take 1 tablet by mouth every 4 hours as needed for pain maximum 3 tablet(s) per day   Quantity:  20 tablet   Refills:  0       ADVAIR DISKUS 250-50 MCG/DOSE diskus inhaler   Generic drug:  fluticasone-salmeterol        Dose:  1 puff   Inhale 1 puff into the lungs 2 times daily   Refills:  0       * albuterol (2.5 MG/3ML) 0.083% neb solution   Used for:  Chronic obstructive pulmonary disease, unspecified COPD type (H)        INHALE 1 VIAL VIA NEBULIZER EVERY 6 HOURS AS NEEDED   Quantity:  360 mL   Refills:  11       * albuterol 108 (90 BASE) MCG/ACT Inhaler   Commonly known as:  PROAIR HFA/PROVENTIL HFA/VENTOLIN HFA   Used for:  JOSE (obstructive sleep apnea)        Dose:  2 puff   Inhale 2 puffs into the lungs every 6 hours as needed for shortness of breath / dyspnea or wheezing   Quantity:  3 Inhaler   Refills:  1       aspirin 81 MG EC tablet   Used for:  Unstable angina (H)        Dose:  81 mg   Take 1 tablet (81 mg) by mouth daily   Quantity:  90 tablet   Refills:  3       atorvastatin 40 MG tablet   Commonly known as:  LIPITOR   Used for:  Hyperlipidemia LDL goal <100        Dose:  40 mg   Take 1 tablet (40 mg) by mouth daily    Quantity:  90 tablet   Refills:  3       brimonidine 0.2 % ophthalmic solution   Commonly known as:  ALPHAGAN   Used for:  Primary open angle glaucoma of both eyes, severe stage        Dose:  1 drop   Place 1 drop into the right eye 3 times daily   Quantity:  15 mL   Refills:  11       calcium citrate-vitamin D 315-250 MG-UNIT Tabs per tablet   Commonly known as:  CITRACAL   Used for:  Osteoporosis        Dose:  2 tablet   Take 2 tablets by mouth daily   Quantity:  120 tablet   Refills:  5       cetirizine 10 MG tablet   Commonly known as:  zyrTEC   Used for:  Seasonal allergic rhinitis, unspecified allergic rhinitis trigger        Dose:  10 mg   Take 1 tablet (10 mg) by mouth daily as needed for allergies   Quantity:  90 tablet   Refills:  3       ciprofloxacin 500 MG tablet   Commonly known as:  CIPRO   Used for:  Urinary tract infection        Dose:  500 mg   Take 1 tablet (500 mg) by mouth 2 times daily for 7 days   Quantity:  14 tablet   Refills:  0       COMPAZINE PO        Dose:  10 mg   Take 10 mg by mouth every 8 hours as needed for nausea   Refills:  0       CYANOCOBALAMIN PO        Dose:  2000 mcg   Take 2,000 mcg by mouth daily   Refills:  0       diclofenac 1 % Gel topical gel   Commonly known as:  VOLTAREN   Used for:  Primary osteoarthritis of both hands        Dose:  2 g   Apply 2 g topically 4 times daily to hands   Quantity:  100 g   Refills:  11       dorzolamide 2 % ophthalmic solution   Commonly known as:  TRUSOPT        Dose:  1 drop   Place 1 drop into the right eye 2 times daily   Refills:  0       EPINEPHrine 0.15 MG/0.3ML injection 2-pack   Commonly known as:  EPIPEN JR   Used for:  Bee sting reaction, undetermined intent, subsequent encounter        Dose:  0.15 mg   Inject 0.3 mLs (0.15 mg) into the muscle as needed for anaphylaxis   Quantity:  0.6 mL   Refills:  1       escitalopram 5 MG tablet   Commonly known as:  LEXAPRO   Used for:  Adjustment disorder with depressed mood         Dose:  10 mg   Take 2 tablets (10 mg) by mouth daily   Quantity:  60 tablet   Refills:  5       ESTRACE VAGINAL 0.1 MG/GM cream   Used for:  Atrophic vaginitis   Generic drug:  estradiol        USE DIME SIZE AMOUNT 3X A WEEK AT NIGHT   Quantity:  42.5 g   Refills:  11       estradiol 1 MG tablet   Commonly known as:  ESTRACE   Used for:  Person who has had sex change operation        Dose:  1 mg   Take 1 tablet (1 mg) by mouth daily   Quantity:  90 tablet   Refills:  3       ferrous sulfate 325 (65 FE) MG tablet   Commonly known as:  IRON        Dose:  325 mg   Take 1 tablet (325 mg) by mouth 2 times daily With meals   Quantity:  60 tablet   Refills:  2       fluticasone 50 MCG/ACT spray   Commonly known as:  FLONASE        Dose:  1 spray   Spray 1 spray into both nostrils daily   Refills:  0       hydrochlorothiazide 12.5 MG capsule   Commonly known as:  MICROZIDE   Used for:  Essential hypertension with goal blood pressure less than 130/80        Dose:  12.5 mg   Take 1 capsule (12.5 mg) by mouth daily   Quantity:  90 capsule   Refills:  3       INCRUSE ELLIPTA 62.5 MCG/INH oral inhaler   Generic drug:  umeclidinium        Dose:  1 puff   Inhale 1 puff into the lungs daily   Refills:  0       RENÉE Pack        Dose:  1 packet   Take 1 packet by mouth 2 times daily   Refills:  0       lactulose 10 GM/15ML solution   Commonly known as:  CHRONULAC        Dose:  20 g   Take 20 g by mouth as needed for constipation   Refills:  0       latanoprost 0.005 % ophthalmic solution   Commonly known as:  XALATAN   Used for:  Primary open angle glaucoma, stage unspecified        Dose:  1 drop   Place 1 drop into both eyes At Bedtime   Quantity:  2.5 mL   Refills:  11       levETIRAcetam 500 MG tablet   Commonly known as:  KEPPRA   Used for:  Nausea        Dose:  500 mg   Take 1 tablet (500 mg) by mouth 2 times daily   Quantity:  180 tablet   Refills:  1       lidocaine 5 % Patch   Commonly known as:  LIDODERM   Used for:   Chronic pain syndrome, 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 (H)        APPLY 1 TO 3 PATCHES TO PAINFUL AREA AT ONCE FOR UP TO 12 HOURS WITHIN A 24 HOUR PERIOD REMOVE AFTER 12 HOURS   Quantity:  30 patch   Refills:  5       lisinopril 10 MG tablet   Commonly known as:  PRINIVIL/ZESTRIL   Used for:  Essential hypertension with goal blood pressure less than 130/80        Dose:  10 mg   Take 1 tablet (10 mg) by mouth daily   Quantity:  90 tablet   Refills:  3       MEDICATION GIVEN BY INTRATHECAL PUMP - INSTRUCTION        continuous January 4, 2018  - per [facility] [place] [phone]: Conc: Bupivacaine 20 mg/mL and Fentanyl 2000 mcg/mL, morphine 2 mg/mL Continuous:  Fentanyl 795.9 mcg/day, Bupivacaine 7.759 mg/day, Morphine 0.7959 mg/day  Boluses: Up to 7 boluses per 24-hr period of Bupivicaine 0.599 mg and Fentanyl 59.9 mcg morphine 0.0599 mg  Pump Refill Date at [facility]: 3/1/18   Refills:  0       metFORMIN 500 MG 24 hr tablet   Commonly known as:  GLUCOPHAGE-XR   Used for:  Type 2 diabetes mellitus with diabetic peripheral angiopathy and gangrene, without long-term current use of insulin (H)        Dose:  500 mg   Take 1 tablet (500 mg) by mouth daily (with dinner)   Quantity:  90 tablet   Refills:  3       metoprolol 25 MG 24 hr tablet   Commonly known as:  TOPROL-XL   Used for:  Old myocardial infarction        TAKE 1 TABLET (25 MG) BY MOUTH DAILY   Quantity:  30 tablet   Refills:  10       MULTIVITAMIN PO        Dose:  1 tablet   Take 1 tablet by mouth daily   Refills:  0       nitroGLYcerin 0.4 MG sublingual tablet   Commonly known as:  NITROSTAT   Used for:  Chronic systolic congestive heart failure (H), Old myocardial infarction        Dose:  0.4 mg   Place 1 tablet (0.4 mg) under the tongue every 5 minutes as needed for chest pain if you are still having symptoms after 3 doses (15 minutes) call 911.   Quantity:  25 tablet   Refills:  1        ONDANSETRON PO        Dose:  4 mg   Take 4 mg by mouth 3 times daily   Refills:  0       pantoprazole 40 MG EC tablet   Commonly known as:  PROTONIX   Used for:  Gastroesophageal reflux disease without esophagitis        Dose:  40 mg   Take 1 tablet (40 mg) by mouth 2 times daily   Quantity:  30 tablet   Refills:  1       Phenytoin Sodium Extended 200 MG Caps   Used for:  Seizure disorder (H)        Dose:  200 mg   Take 200 mg by mouth 2 times daily   Quantity:  60 capsule   Refills:  0       polyethylene glycol Packet   Commonly known as:  MIRALAX/GLYCOLAX        Dose:  17 g   Take 17 g by mouth daily Dissolved in water or juice   Refills:  0       pregabalin 75 MG capsule   Commonly known as:  LYRICA   Used for:  Pain in both upper extremities        Dose:  150 mg   Take 2 capsules (150 mg) by mouth 2 times daily   Quantity:  120 capsule   Refills:  5       risperiDONE 0.5 MG tablet   Commonly known as:  risperDAL        Dose:  0.5 mg   Take 0.5 mg by mouth At Bedtime   Refills:  0       sucralfate 1 GM tablet   Commonly known as:  CARAFATE   Used for:  Adjustment disorder with depressed mood        Dose:  1 g   Take 1 tablet (1 g) by mouth 4 times daily May dissolve in 10 mL water is necessary. (Start upon completion of carafate suspension)   Quantity:  120 tablet   Refills:  11       traZODone 100 MG tablet   Commonly known as:  DESYREL   Used for:  Anxiety state        Dose:  150 mg   Take 1.5 tablets (150 mg) by mouth At Bedtime   Quantity:  90 tablet   Refills:  3       vitamin D 2000 UNITS tablet        Dose:  2000 Units   Take 2,000 Units by mouth daily.   Quantity:  100 tablet   Refills:  3       zinc 50 MG Tabs        Dose:  1 tablet   Take 1 tablet by mouth daily   Refills:  0       * Notice:  This list has 2 medication(s) that are the same as other medications prescribed for you. Read the directions carefully, and ask your doctor or other care provider to review them with you.      CONTINUE these  medicines which have NOT CHANGED        Dose / Directions    blood glucose monitoring lancets   Used for:  Type 2 diabetes mellitus with diabetic peripheral angiopathy without gangrene, without long-term current use of insulin (H)        Use to test blood sugar 3 times daily or as directed.   Quantity:  100 each   Refills:  PRN       blood glucose monitoring meter device kit   Used for:  Type 2 diabetes, HbA1C goal < 8% (H)        Use to test blood sugars 3 times daily or as directed.   Quantity:  1 kit   Refills:  0       blood glucose monitoring test strip   Commonly known as:  ONETOUCH ULTRA   Used for:  Type 2 diabetes mellitus with diabetic peripheral angiopathy without gangrene, without long-term current use of insulin (H)        Use to test blood sugars 3 times daily or as directed.   Quantity:  3 Box   Refills:  3       * order for DME   Used for:  Incontinence        Equipment being ordered: Depends briefs   Quantity:  1 Month   Refills:  11       * order for DME   Used for:  CVA (cerebral infarction), HTN (hypertension)        Equipment being ordered: Power Wheelchair   Quantity:  1 Device   Refills:  0       * order for DME   Used for:  Type 2 diabetes mellitus with other diabetic neurological complication        Equipment being ordered: Glucerna daily shakes with each meal   Quantity:  1 Box   Refills:  11       * order for DME   Used for:  Simple chronic bronchitis (H)        Equipment being ordered: Nebulizer and tubing supplies   Quantity:  1 Units   Refills:  0       * order for DME   Used for:  Chronic obstructive pulmonary disease, unspecified COPD type (H)        Equipment being ordered: CPAP supplies mask, hose, filters, cushion fax to [facility] at [phone] Disp: 10 DeviceRefills: prn Class: Local PrintStart: 2/10/2017   Quantity:  1 Device   Refills:  0       * order for DME   Used for:  COPD (chronic obstructive pulmonary disease) (H)        Equipment being ordered: CPAP supplies mask,  hose, filters, cushion  fax to [facility] at [phone]   Quantity:  10 Device   Refills:  prn       * order for DME   Used for:  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 (H)        Hospital bed with side rails   Quantity:  1 Device   Refills:  0       * order for DME   Used for:  Status post bilateral above knee amputation (H)        Full electric hospital bed with half rails  Dx: B75506, I110, J449 Length of need: lifetime   Quantity:  1 Device   Refills:  0       * order for DME   Used for:  Status post bilateral above knee amputation (H)        Wheel Chair Cushion: 18 x 18 inch Roho cushion   Quantity:  1 Device   Refills:  0       * Notice:  This list has 9 medication(s) that are the same as other medications prescribed for you. Read the directions carefully, and ask your doctor or other care provider to review them with you.             Protect others around you: Learn how to safely use, store and throw away your medicines at www.disposemymeds.org.             Medication List: This is a list of all your medications and when to take them. Check marks below indicate your daily home schedule. Keep this list as a reference.      Medications           Morning Afternoon Evening Bedtime As Needed    ACETAMINOPHEN PO   Take 1,000 mg by mouth every 4 hours as needed for fever Not to exceed 4000 mg/day                                acetaminophen-codeine 300-30 MG per tablet   Commonly known as:  TYLENOL #3   Take 1 tablet by mouth every 4 hours as needed for pain maximum 3 tablet(s) per day                                ADVAIR DISKUS 250-50 MCG/DOSE diskus inhaler   Inhale 1 puff into the lungs 2 times daily   Generic drug:  fluticasone-salmeterol                                * albuterol (2.5 MG/3ML) 0.083% neb solution   INHALE 1 VIAL VIA NEBULIZER EVERY 6 HOURS AS NEEDED                                * albuterol 108 (90 BASE) MCG/ACT Inhaler   Commonly known as:  PROAIR HFA/PROVENTIL  HFA/VENTOLIN HFA   Inhale 2 puffs into the lungs every 6 hours as needed for shortness of breath / dyspnea or wheezing                                aspirin 81 MG EC tablet   Take 1 tablet (81 mg) by mouth daily                                atorvastatin 40 MG tablet   Commonly known as:  LIPITOR   Take 1 tablet (40 mg) by mouth daily                                blood glucose monitoring lancets   Use to test blood sugar 3 times daily or as directed.                                blood glucose monitoring meter device kit   Use to test blood sugars 3 times daily or as directed.                                blood glucose monitoring test strip   Commonly known as:  ONETOUCH ULTRA   Use to test blood sugars 3 times daily or as directed.                                brimonidine 0.2 % ophthalmic solution   Commonly known as:  ALPHAGAN   Place 1 drop into the right eye 3 times daily                                calcium citrate-vitamin D 315-250 MG-UNIT Tabs per tablet   Commonly known as:  CITRACAL   Take 2 tablets by mouth daily                                cetirizine 10 MG tablet   Commonly known as:  zyrTEC   Take 1 tablet (10 mg) by mouth daily as needed for allergies                                ciprofloxacin 500 MG tablet   Commonly known as:  CIPRO   Take 1 tablet (500 mg) by mouth 2 times daily for 7 days                                COMPAZINE PO   Take 10 mg by mouth every 8 hours as needed for nausea                                CYANOCOBALAMIN PO   Take 2,000 mcg by mouth daily                                diclofenac 1 % Gel topical gel   Commonly known as:  VOLTAREN   Apply 2 g topically 4 times daily to hands                                dorzolamide 2 % ophthalmic solution   Commonly known as:  TRUSOPT   Place 1 drop into the right eye 2 times daily                                EPINEPHrine 0.15 MG/0.3ML injection 2-pack   Commonly known as:  EPIPEN JR   Inject 0.3 mLs (0.15 mg) into the  muscle as needed for anaphylaxis                                escitalopram 5 MG tablet   Commonly known as:  LEXAPRO   Take 2 tablets (10 mg) by mouth daily                                ESTRACE VAGINAL 0.1 MG/GM cream   USE DIME SIZE AMOUNT 3X A WEEK AT NIGHT   Generic drug:  estradiol                                estradiol 1 MG tablet   Commonly known as:  ESTRACE   Take 1 tablet (1 mg) by mouth daily                                ferrous sulfate 325 (65 FE) MG tablet   Commonly known as:  IRON   Take 1 tablet (325 mg) by mouth 2 times daily With meals                                fluticasone 50 MCG/ACT spray   Commonly known as:  FLONASE   Spray 1 spray into both nostrils daily                                hydrochlorothiazide 12.5 MG capsule   Commonly known as:  MICROZIDE   Take 1 capsule (12.5 mg) by mouth daily                                INCRUSE ELLIPTA 62.5 MCG/INH oral inhaler   Inhale 1 puff into the lungs daily   Generic drug:  umeclidinium                                RENÉE Pack   Take 1 packet by mouth 2 times daily                                lactulose 10 GM/15ML solution   Commonly known as:  CHRONULAC   Take 20 g by mouth as needed for constipation                                latanoprost 0.005 % ophthalmic solution   Commonly known as:  XALATAN   Place 1 drop into both eyes At Bedtime                                levETIRAcetam 500 MG tablet   Commonly known as:  KEPPRA   Take 1 tablet (500 mg) by mouth 2 times daily                                lidocaine 5 % Patch   Commonly known as:  LIDODERM   APPLY 1 TO 3 PATCHES TO PAINFUL AREA AT ONCE FOR UP TO 12 HOURS WITHIN A 24 HOUR PERIOD REMOVE AFTER 12 HOURS                                lisinopril 10 MG tablet   Commonly known as:  PRINIVIL/ZESTRIL   Take 1 tablet (10 mg) by mouth daily                                MEDICATION GIVEN BY INTRATHECAL PUMP - INSTRUCTION   continuous January 4, 2018  - per [facility] [place] [phone]: Conc: Bupivacaine 20 mg/mL and Fentanyl 2000 mcg/mL, morphine 2 mg/mL Continuous:  Fentanyl 795.9 mcg/day, Bupivacaine 7.759 mg/day, Morphine 0.7959 mg/day  Boluses: Up to 7 boluses per 24-hr period of Bupivicaine 0.599 mg and Fentanyl 59.9 mcg morphine 0.0599 mg  Pump Refill Date at Max Activations: 3/1/18                                metFORMIN 500 MG 24 hr tablet   Commonly known as:  GLUCOPHAGE-XR   Take 1 tablet (500 mg) by mouth daily (with dinner)                                metoprolol 25 MG 24 hr tablet   Commonly known as:  TOPROL-XL   TAKE 1 TABLET (25 MG) BY MOUTH DAILY                                MULTIVITAMIN PO   Take 1 tablet by mouth daily                                nitroGLYcerin 0.4 MG sublingual tablet   Commonly known as:  NITROSTAT   Place 1 tablet (0.4 mg) under the tongue every 5 minutes as needed for chest pain if you are still having symptoms after 3 doses (15 minutes) call 911.                                ONDANSETRON PO   Take 4 mg by mouth 3 times daily                                * order for DME   Equipment being ordered: Depends briefs                                * order for DME   Equipment being ordered: Power Wheelchair                                * order for DME   Equipment being ordered: Glucerna daily shakes with each meal                                * order for DME   Equipment being ordered: Nebulizer and tubing supplies                                * order for DME   Equipment being ordered: CPAP supplies mask, hose, filters, cushion fax to [facility] at [phone] Disp: 10 DeviceRefills: prn Class: Local PrintStart: 2/10/2017                                * order for DME   Equipment being ordered: CPAP supplies mask, hose, filters, cushion  fax to [facility] at [phone]                                * order for DME   Hospital bed with side rails                                * order for DME    Full electric hospital bed with half rails  Dx: Q31946, I110, J449 Length of need: lifetime                                * order for DME   Wheel Chair Cushion: 18 x 18 inch Roho cushion                                pantoprazole 40 MG EC tablet   Commonly known as:  PROTONIX   Take 1 tablet (40 mg) by mouth 2 times daily                                Phenytoin Sodium Extended 200 MG Caps   Take 200 mg by mouth 2 times daily                                polyethylene glycol Packet   Commonly known as:  MIRALAX/GLYCOLAX   Take 17 g by mouth daily Dissolved in water or juice                                pregabalin 75 MG capsule   Commonly known as:  LYRICA   Take 2 capsules (150 mg) by mouth 2 times daily                                risperiDONE 0.5 MG tablet   Commonly known as:  risperDAL   Take 0.5 mg by mouth At Bedtime                                sucralfate 1 GM tablet   Commonly known as:  CARAFATE   Take 1 tablet (1 g) by mouth 4 times daily May dissolve in 10 mL water is necessary. (Start upon completion of carafate suspension)                                traZODone 100 MG tablet   Commonly known as:  DESYREL   Take 1.5 tablets (150 mg) by mouth At Bedtime                                vitamin D 2000 UNITS tablet   Take 2,000 Units by mouth daily.                                zinc 50 MG Tabs   Take 1 tablet by mouth daily                                * Notice:  This list has 11 medication(s) that are the same as other medications prescribed for you. Read the directions carefully, and ask your doctor or other care provider to review them with you.

## 2018-01-11 ENCOUNTER — DOCUMENTATION ONLY (OUTPATIENT)
Dept: OTHER | Facility: CLINIC | Age: 68
End: 2018-01-11

## 2018-01-11 DIAGNOSIS — Z71.89 ACP (ADVANCE CARE PLANNING): Chronic | ICD-10-CM

## 2018-01-11 LAB — COPATH REPORT: NORMAL

## 2018-01-11 NOTE — PROGRESS NOTES
1/11/18: CM spoke with member - relayed information to her re: roho cushion.  She verbalized understanding.      Member also asked about talking wall clock that has large numbers.  CM placed call to [employer] - spoke with Sonali - they do not have in stock but can order specific one for member.   CM spoke with member - she is unsure what she wants but states she can have Fátima, OT help her.     CM sent email to Fátima - she has 1 more visit scheduled with member - she is waiting until roho is delivered.   She will look at clocks with member when she sees her and send CM link.       Jamie Sharma RN, N  [employer]

## 2018-01-15 NOTE — PROGRESS NOTES
1/15/17: CM spoke with member - she received cushion.   She states she is getting ready to send old cushion back to company - she will have staff assist her.   She is very scared and nervous for surgery tomorrow.  She states she is ready and has been waiting so long.   She will f/u with CM when able.     Jamie Sharma RN, N  [facility]

## 2018-01-16 ENCOUNTER — CARE COORDINATION (OUTPATIENT)
Dept: GASTROENTEROLOGY | Facility: CLINIC | Age: 68
End: 2018-01-16

## 2018-01-16 ENCOUNTER — HOSPITAL ENCOUNTER (OUTPATIENT)
Facility: CLINIC | Age: 68
Discharge: HOME | End: 2018-01-17
Attending: UROLOGY | Admitting: UROLOGY
Payer: COMMERCIAL

## 2018-01-16 ENCOUNTER — CARE COORDINATION (OUTPATIENT)
Dept: GERIATRIC MEDICINE | Facility: CLINIC | Age: 68
End: 2018-01-16

## 2018-01-16 ENCOUNTER — ANESTHESIA (OUTPATIENT)
Dept: SURGERY | Facility: CLINIC | Age: 68
End: 2018-01-16
Payer: COMMERCIAL

## 2018-01-16 ENCOUNTER — SURGERY (OUTPATIENT)
Age: 68
End: 2018-01-16

## 2018-01-16 DIAGNOSIS — B37.9 CANDIDA INFECTION: Primary | ICD-10-CM

## 2018-01-16 DIAGNOSIS — G44.209 TENSION HEADACHE: ICD-10-CM

## 2018-01-16 DIAGNOSIS — K20.90 ESOPHAGITIS DETERMINED BY BIOPSY: ICD-10-CM

## 2018-01-16 PROBLEM — R32 INCONTINENCE: Status: ACTIVE | Noted: 2018-01-16

## 2018-01-16 LAB
CREAT SERPL-MCNC: 0.48 MG/DL (ref 0.52–1.04)
GFR SERPL CREATININE-BSD FRML MDRD: >90 ML/MIN/1.7M2
GLUCOSE BLDC GLUCOMTR-MCNC: 100 MG/DL (ref 70–99)
GLUCOSE BLDC GLUCOMTR-MCNC: 113 MG/DL (ref 70–99)
GLUCOSE BLDC GLUCOMTR-MCNC: 69 MG/DL (ref 70–99)
POTASSIUM SERPL-SCNC: 3.7 MMOL/L (ref 3.4–5.3)

## 2018-01-16 PROCEDURE — 25000128 H RX IP 250 OP 636: Performed by: ANESTHESIOLOGY

## 2018-01-16 PROCEDURE — 36000055 ZZH SURGERY LEVEL 2 W FLUORO 1ST 30 MIN - UMMC: Performed by: UROLOGY

## 2018-01-16 PROCEDURE — 82962 GLUCOSE BLOOD TEST: CPT

## 2018-01-16 PROCEDURE — 93005 ELECTROCARDIOGRAM TRACING: CPT

## 2018-01-16 PROCEDURE — 40000170 ZZH STATISTIC PRE-PROCEDURE ASSESSMENT II: Performed by: UROLOGY

## 2018-01-16 PROCEDURE — 25000125 ZZHC RX 250: Performed by: UROLOGY

## 2018-01-16 PROCEDURE — 36000053 ZZH SURGERY LEVEL 2 EA 15 ADDTL MIN - UMMC: Performed by: UROLOGY

## 2018-01-16 PROCEDURE — 37000008 ZZH ANESTHESIA TECHNICAL FEE, 1ST 30 MIN: Performed by: UROLOGY

## 2018-01-16 PROCEDURE — 40000065 ZZH STATISTIC EKG NON-CHARGEABLE

## 2018-01-16 PROCEDURE — 25000125 ZZHC RX 250: Performed by: NURSE ANESTHETIST, CERTIFIED REGISTERED

## 2018-01-16 PROCEDURE — 25000132 ZZH RX MED GY IP 250 OP 250 PS 637: Performed by: STUDENT IN AN ORGANIZED HEALTH CARE EDUCATION/TRAINING PROGRAM

## 2018-01-16 PROCEDURE — 27210794 ZZH OR GENERAL SUPPLY STERILE: Performed by: UROLOGY

## 2018-01-16 PROCEDURE — 71000027 ZZH RECOVERY PHASE 2 EACH 15 MINS: Performed by: UROLOGY

## 2018-01-16 PROCEDURE — 25000125 ZZHC RX 250: Performed by: STUDENT IN AN ORGANIZED HEALTH CARE EDUCATION/TRAINING PROGRAM

## 2018-01-16 PROCEDURE — 36416 COLLJ CAPILLARY BLOOD SPEC: CPT | Performed by: ANESTHESIOLOGY

## 2018-01-16 PROCEDURE — 82565 ASSAY OF CREATININE: CPT | Performed by: ANESTHESIOLOGY

## 2018-01-16 PROCEDURE — 25000132 ZZH RX MED GY IP 250 OP 250 PS 637: Performed by: ANESTHESIOLOGY

## 2018-01-16 PROCEDURE — 84132 ASSAY OF SERUM POTASSIUM: CPT | Performed by: ANESTHESIOLOGY

## 2018-01-16 PROCEDURE — 25000128 H RX IP 250 OP 636: Performed by: NURSE ANESTHETIST, CERTIFIED REGISTERED

## 2018-01-16 PROCEDURE — 37000009 ZZH ANESTHESIA TECHNICAL FEE, EACH ADDTL 15 MIN: Performed by: UROLOGY

## 2018-01-16 PROCEDURE — C2627 CATH, SUPRAPUBIC/CYSTOSCOPIC: HCPCS | Performed by: UROLOGY

## 2018-01-16 RX ORDER — FENTANYL CITRATE 50 UG/ML
INJECTION, SOLUTION INTRAMUSCULAR; INTRAVENOUS PRN
Status: DISCONTINUED | OUTPATIENT
Start: 2018-01-16 | End: 2018-01-16

## 2018-01-16 RX ORDER — NALOXONE HYDROCHLORIDE 0.4 MG/ML
.1-.4 INJECTION, SOLUTION INTRAMUSCULAR; INTRAVENOUS; SUBCUTANEOUS
Status: DISCONTINUED | OUTPATIENT
Start: 2018-01-16 | End: 2018-01-16

## 2018-01-16 RX ORDER — NALOXONE HYDROCHLORIDE 0.4 MG/ML
.1-.4 INJECTION, SOLUTION INTRAMUSCULAR; INTRAVENOUS; SUBCUTANEOUS
Status: DISCONTINUED | OUTPATIENT
Start: 2018-01-16 | End: 2018-01-17 | Stop reason: HOSPADM

## 2018-01-16 RX ORDER — PROPOFOL 10 MG/ML
INJECTION, EMULSION INTRAVENOUS PRN
Status: DISCONTINUED | OUTPATIENT
Start: 2018-01-16 | End: 2018-01-16

## 2018-01-16 RX ORDER — FENTANYL CITRATE 50 UG/ML
25-50 INJECTION, SOLUTION INTRAMUSCULAR; INTRAVENOUS
Status: DISCONTINUED | OUTPATIENT
Start: 2018-01-16 | End: 2018-01-16 | Stop reason: HOSPADM

## 2018-01-16 RX ORDER — ESCITALOPRAM OXALATE 10 MG/1
10 TABLET ORAL AT BEDTIME
Status: DISCONTINUED | OUTPATIENT
Start: 2018-01-16 | End: 2018-01-17 | Stop reason: HOSPADM

## 2018-01-16 RX ORDER — PREGABALIN 75 MG/1
150 CAPSULE ORAL 2 TIMES DAILY
Status: DISCONTINUED | OUTPATIENT
Start: 2018-01-16 | End: 2018-01-17 | Stop reason: HOSPADM

## 2018-01-16 RX ORDER — ESCITALOPRAM OXALATE 10 MG/1
10 TABLET ORAL DAILY
COMMUNITY
Start: 2017-12-19 | End: 2019-06-05

## 2018-01-16 RX ORDER — ALBUTEROL SULFATE 90 UG/1
2 AEROSOL, METERED RESPIRATORY (INHALATION) EVERY 6 HOURS PRN
Status: DISCONTINUED | OUTPATIENT
Start: 2018-01-16 | End: 2018-01-17 | Stop reason: HOSPADM

## 2018-01-16 RX ORDER — FLUCONAZOLE 100 MG/1
100 TABLET ORAL DAILY
Qty: 14 TABLET | Refills: 0 | Status: ON HOLD | OUTPATIENT
Start: 2018-01-16 | End: 2018-01-16

## 2018-01-16 RX ORDER — PROCHLORPERAZINE MALEATE 10 MG
10 TABLET ORAL EVERY 8 HOURS PRN
Status: DISCONTINUED | OUTPATIENT
Start: 2018-01-16 | End: 2018-01-17 | Stop reason: HOSPADM

## 2018-01-16 RX ORDER — PROPOFOL 10 MG/ML
INJECTION, EMULSION INTRAVENOUS CONTINUOUS PRN
Status: DISCONTINUED | OUTPATIENT
Start: 2018-01-16 | End: 2018-01-16

## 2018-01-16 RX ORDER — PANTOPRAZOLE SODIUM 40 MG/1
40 TABLET, DELAYED RELEASE ORAL DAILY
Status: DISCONTINUED | OUTPATIENT
Start: 2018-01-16 | End: 2018-01-17 | Stop reason: HOSPADM

## 2018-01-16 RX ORDER — RISPERIDONE 0.5 MG/1
0.5 TABLET ORAL AT BEDTIME
Status: DISCONTINUED | OUTPATIENT
Start: 2018-01-16 | End: 2018-01-17 | Stop reason: HOSPADM

## 2018-01-16 RX ORDER — TRAZODONE HYDROCHLORIDE 50 MG/1
150 TABLET, FILM COATED ORAL AT BEDTIME
COMMUNITY
Start: 2017-12-19 | End: 2019-06-14

## 2018-01-16 RX ORDER — CLINDAMYCIN PHOSPHATE 900 MG/50ML
900 INJECTION, SOLUTION INTRAVENOUS
Status: COMPLETED | OUTPATIENT
Start: 2018-01-16 | End: 2018-01-16

## 2018-01-16 RX ORDER — SODIUM CHLORIDE, SODIUM LACTATE, POTASSIUM CHLORIDE, CALCIUM CHLORIDE 600; 310; 30; 20 MG/100ML; MG/100ML; MG/100ML; MG/100ML
INJECTION, SOLUTION INTRAVENOUS CONTINUOUS
Status: DISCONTINUED | OUTPATIENT
Start: 2018-01-16 | End: 2018-01-16

## 2018-01-16 RX ORDER — SODIUM CHLORIDE, SODIUM LACTATE, POTASSIUM CHLORIDE, CALCIUM CHLORIDE 600; 310; 30; 20 MG/100ML; MG/100ML; MG/100ML; MG/100ML
INJECTION, SOLUTION INTRAVENOUS CONTINUOUS
Status: DISCONTINUED | OUTPATIENT
Start: 2018-01-16 | End: 2018-01-16 | Stop reason: HOSPADM

## 2018-01-16 RX ORDER — ONDANSETRON 4 MG/1
4 TABLET, ORALLY DISINTEGRATING ORAL 3 TIMES DAILY
Status: DISCONTINUED | OUTPATIENT
Start: 2018-01-16 | End: 2018-01-17 | Stop reason: HOSPADM

## 2018-01-16 RX ORDER — SODIUM CHLORIDE, SODIUM LACTATE, POTASSIUM CHLORIDE, CALCIUM CHLORIDE 600; 310; 30; 20 MG/100ML; MG/100ML; MG/100ML; MG/100ML
INJECTION, SOLUTION INTRAVENOUS CONTINUOUS PRN
Status: DISCONTINUED | OUTPATIENT
Start: 2018-01-16 | End: 2018-01-16

## 2018-01-16 RX ORDER — LIDOCAINE 40 MG/G
CREAM TOPICAL
Status: DISCONTINUED | OUTPATIENT
Start: 2018-01-16 | End: 2018-01-17 | Stop reason: HOSPADM

## 2018-01-16 RX ORDER — MEPERIDINE HYDROCHLORIDE 25 MG/ML
12.5 INJECTION INTRAMUSCULAR; INTRAVENOUS; SUBCUTANEOUS
Status: DISCONTINUED | OUTPATIENT
Start: 2018-01-16 | End: 2018-01-16 | Stop reason: HOSPADM

## 2018-01-16 RX ORDER — PHENYTOIN SODIUM 100 MG/1
200 CAPSULE, EXTENDED RELEASE ORAL 2 TIMES DAILY
Status: DISCONTINUED | OUTPATIENT
Start: 2018-01-16 | End: 2018-01-17 | Stop reason: HOSPADM

## 2018-01-16 RX ORDER — NICOTINE POLACRILEX 4 MG
15 LOZENGE BUCCAL ONCE
Status: COMPLETED | OUTPATIENT
Start: 2018-01-16 | End: 2018-01-16

## 2018-01-16 RX ORDER — PREGABALIN 150 MG/1
CAPSULE ORAL
COMMUNITY
Start: 2018-01-05 | End: 2018-02-08

## 2018-01-16 RX ORDER — DORZOLAMIDE HYDROCHLORIDE AND TIMOLOL MALEATE 20; 5 MG/ML; MG/ML
SOLUTION/ DROPS OPHTHALMIC
COMMUNITY
Start: 2017-11-02 | End: 2018-02-08

## 2018-01-16 RX ORDER — HYDROCHLOROTHIAZIDE 12.5 MG/1
12.5 CAPSULE ORAL DAILY
Status: DISCONTINUED | OUTPATIENT
Start: 2018-01-16 | End: 2018-01-17 | Stop reason: HOSPADM

## 2018-01-16 RX ORDER — ALBUTEROL SULFATE 0.83 MG/ML
2.5 SOLUTION RESPIRATORY (INHALATION) EVERY 6 HOURS PRN
Status: DISCONTINUED | OUTPATIENT
Start: 2018-01-16 | End: 2018-01-17 | Stop reason: HOSPADM

## 2018-01-16 RX ORDER — CLINDAMYCIN PHOSPHATE 900 MG/50ML
900 INJECTION, SOLUTION INTRAVENOUS SEE ADMIN INSTRUCTIONS
Status: DISCONTINUED | OUTPATIENT
Start: 2018-01-16 | End: 2018-01-16

## 2018-01-16 RX ORDER — CLINDAMYCIN PHOSPHATE 900 MG/50ML
900 INJECTION, SOLUTION INTRAVENOUS
Status: DISCONTINUED | OUTPATIENT
Start: 2018-01-16 | End: 2018-01-16

## 2018-01-16 RX ORDER — LACTULOSE 10 G/15ML
20 SOLUTION ORAL DAILY PRN
Status: DISCONTINUED | OUTPATIENT
Start: 2018-01-16 | End: 2018-01-17 | Stop reason: HOSPADM

## 2018-01-16 RX ORDER — OXYCODONE HYDROCHLORIDE 5 MG/1
5 TABLET ORAL
Status: DISCONTINUED | OUTPATIENT
Start: 2018-01-16 | End: 2018-01-17 | Stop reason: HOSPADM

## 2018-01-16 RX ORDER — ONDANSETRON 4 MG/1
TABLET, FILM COATED ORAL
Status: ON HOLD | COMMUNITY
Start: 2018-01-11 | End: 2018-01-16

## 2018-01-16 RX ORDER — METOPROLOL SUCCINATE 25 MG/1
25 TABLET, EXTENDED RELEASE ORAL DAILY
Status: DISCONTINUED | OUTPATIENT
Start: 2018-01-17 | End: 2018-01-17 | Stop reason: HOSPADM

## 2018-01-16 RX ORDER — FENTANYL CITRATE 50 UG/ML
50 INJECTION, SOLUTION INTRAMUSCULAR; INTRAVENOUS ONCE
Status: COMPLETED | OUTPATIENT
Start: 2018-01-16 | End: 2018-01-16

## 2018-01-16 RX ORDER — NALOXONE HYDROCHLORIDE 0.4 MG/ML
.1-.4 INJECTION, SOLUTION INTRAMUSCULAR; INTRAVENOUS; SUBCUTANEOUS
Status: DISCONTINUED | OUTPATIENT
Start: 2018-01-16 | End: 2018-01-16 | Stop reason: HOSPADM

## 2018-01-16 RX ORDER — ONDANSETRON 2 MG/ML
4 INJECTION INTRAMUSCULAR; INTRAVENOUS EVERY 30 MIN PRN
Status: DISCONTINUED | OUTPATIENT
Start: 2018-01-16 | End: 2018-01-16 | Stop reason: HOSPADM

## 2018-01-16 RX ORDER — LEVETIRACETAM 500 MG/1
500 TABLET ORAL 2 TIMES DAILY
Status: DISCONTINUED | OUTPATIENT
Start: 2018-01-16 | End: 2018-01-17 | Stop reason: HOSPADM

## 2018-01-16 RX ORDER — TIOTROPIUM BROMIDE 18 UG/1
CAPSULE ORAL; RESPIRATORY (INHALATION)
Status: ON HOLD | COMMUNITY
Start: 2017-10-08 | End: 2018-02-08

## 2018-01-16 RX ORDER — LISINOPRIL 10 MG/1
10 TABLET ORAL DAILY
Status: DISCONTINUED | OUTPATIENT
Start: 2018-01-17 | End: 2018-01-17 | Stop reason: HOSPADM

## 2018-01-16 RX ORDER — ONDANSETRON 4 MG/1
4 TABLET, ORALLY DISINTEGRATING ORAL EVERY 30 MIN PRN
Status: DISCONTINUED | OUTPATIENT
Start: 2018-01-16 | End: 2018-01-16 | Stop reason: HOSPADM

## 2018-01-16 RX ADMIN — TRAZODONE HYDROCHLORIDE 150 MG: 100 TABLET ORAL at 21:34

## 2018-01-16 RX ADMIN — CLINDAMYCIN PHOSPHATE 900 MG: 18 INJECTION, SOLUTION INTRAVENOUS at 15:24

## 2018-01-16 RX ADMIN — SODIUM CHLORIDE, POTASSIUM CHLORIDE, SODIUM LACTATE AND CALCIUM CHLORIDE 100 ML/HR: 600; 310; 30; 20 INJECTION, SOLUTION INTRAVENOUS at 17:21

## 2018-01-16 RX ADMIN — PHENYTOIN SODIUM 200 MG: 100 CAPSULE, EXTENDED RELEASE ORAL at 21:36

## 2018-01-16 RX ADMIN — RISPERIDONE 0.5 MG: 0.5 TABLET ORAL at 21:35

## 2018-01-16 RX ADMIN — ESCITALOPRAM OXALATE 10 MG: 10 TABLET ORAL at 21:36

## 2018-01-16 RX ADMIN — DEXTROSE 15 G: 15 GEL ORAL at 17:35

## 2018-01-16 RX ADMIN — Medication 0.5 MG: at 16:55

## 2018-01-16 RX ADMIN — PROPOFOL 40 MG: 10 INJECTION, EMULSION INTRAVENOUS at 15:24

## 2018-01-16 RX ADMIN — MIDAZOLAM 1 MG: 1 INJECTION INTRAMUSCULAR; INTRAVENOUS at 15:36

## 2018-01-16 RX ADMIN — Medication 0.4 MG: at 19:06

## 2018-01-16 RX ADMIN — LEVETIRACETAM 500 MG: 500 TABLET ORAL at 21:36

## 2018-01-16 RX ADMIN — PANTOPRAZOLE SODIUM 40 MG: 40 TABLET, DELAYED RELEASE ORAL at 21:35

## 2018-01-16 RX ADMIN — FENTANYL CITRATE 50 MCG: 50 INJECTION, SOLUTION INTRAMUSCULAR; INTRAVENOUS at 15:10

## 2018-01-16 RX ADMIN — FENTANYL CITRATE 50 MCG: 50 INJECTION INTRAMUSCULAR; INTRAVENOUS at 14:13

## 2018-01-16 RX ADMIN — FENTANYL CITRATE 25 MCG: 50 INJECTION, SOLUTION INTRAMUSCULAR; INTRAVENOUS at 15:31

## 2018-01-16 RX ADMIN — PROPOFOL 50 MCG/KG/MIN: 10 INJECTION, EMULSION INTRAVENOUS at 15:20

## 2018-01-16 RX ADMIN — SODIUM CHLORIDE, POTASSIUM CHLORIDE, SODIUM LACTATE AND CALCIUM CHLORIDE: 600; 310; 30; 20 INJECTION, SOLUTION INTRAVENOUS at 15:07

## 2018-01-16 RX ADMIN — PREGABALIN 150 MG: 75 CAPSULE ORAL at 21:35

## 2018-01-16 RX ADMIN — PROPOFOL 30 MG: 10 INJECTION, EMULSION INTRAVENOUS at 15:56

## 2018-01-16 RX ADMIN — PROPOFOL 30 MG: 10 INJECTION, EMULSION INTRAVENOUS at 16:02

## 2018-01-16 RX ADMIN — HYDROCHLOROTHIAZIDE 12.5 MG: 12.5 CAPSULE ORAL at 21:34

## 2018-01-16 RX ADMIN — OXYCODONE HYDROCHLORIDE 5 MG: 5 TABLET ORAL at 19:06

## 2018-01-16 RX ADMIN — ONDANSETRON 4 MG: 4 TABLET, ORALLY DISINTEGRATING ORAL at 21:35

## 2018-01-16 RX ADMIN — Medication 0.5 MG: at 17:21

## 2018-01-16 RX ADMIN — Medication 0.5 MG: at 17:35

## 2018-01-16 ASSESSMENT — PAIN DESCRIPTION - DESCRIPTORS: DESCRIPTORS: ACHING;CONSTANT

## 2018-01-16 NOTE — OP NOTE
DATE OF SURGERY: 01/16/18  PREOPERATIVE DIAGNOSIS:   1) Urinary Incontinence  POSTOPERATIVE DIAGNOSIS: Same  PROCEDURE:   1) Cystourethroscopy  2) Placement of suprapubic cystotomy tube  3) Intraoperative ultrasound and interpretation.   ANESTHESIA: General endotracheal  STAFF SURGEON: Keanu Dawson*  RESIDENT SURGEON: Mariela Molina MD  ESTIMATED BLOOD LOSS: 5 mL.   COMPLICATIONS: None immediately.   SPECIMENS: none   DRAINS: 16 Belgian suprapubic catheter  INDICATIONS: Sonya Foote  is a 67 year old female who has history of multiple medical comorbidities who presented due to incontinence secondary to bilateral above the knee amputations.  After discussion of risks, benefits and alternatives, the patient agreed to proceed with the above named procedures.  DESCRIPTION OF THE PROCEDURE: After obtaining informed consent, the patient was brought to the operating room and placed in the supine position on the operating room table. All pressure points were adequately cushioned and pneumatic compression devices were applied to the patient's bilateral lower extremities. Appropriate preoperative antibiotics were administered. MAC anesthesia was induced without difficulty. The patient was repositioned into the froglegged position. The patient was then prepped and draped in the usual sterile fashion. A timeout was performed to confirm the correct patient, positioning, procedure, and laterality.   Flexible cystoscopy was performed and demonstrated no tumors or stones within the bladder. There was a normal bladder capacity. Media was clear.    An abdominal ultrasound was then performed to localize the bladder and confirm there was no intervening bowel.  After the bladder was filled with sterile saline, we made a 1 centimeter incision with #11 blade scalpel approximately 2 fingerbreadths above the pubic bone.  We then used a spinal needle to find a good trajectory under ultrasonic guidance into the bladder. Once  we isolated this trajectory, we then inserted the Micky trocar in through the incision until urine returned. We then removed the inner cannula of the trocar and placed the 16-Wallisian Landrum catheter through the trocar and inflated the catheter with 10 mL in the balloon. Dressing was placed around the suprapubic catheter and a drainage bag was attached. The catheter was secured to the abdomen. The patient was awakened from anesthesia in stable condition.

## 2018-01-16 NOTE — ANESTHESIA CARE TRANSFER NOTE
Patient: Sonya Foote    Procedure(s):  Cystoscopy, Intraoperative Ultrasound, Suprapubic Tube Placement - Wound Class: II-Clean Contaminated    Diagnosis: Functional Incontinence   Diagnosis Additional Information: No value filed.    Anesthesia Type:   General, ETT     Note:  Airway :Room Air  Patient transferred to:Phase II  Comments: VSS. Ventilating well.Handoff Report: Identifed the Patient, Identified the Reponsible Provider, Reviewed the pertinent medical history, Discussed the surgical course, Reviewed Intra-OP anesthesia mangement and issues during anesthesia, Set expectations for post-procedure period and Allowed opportunity for questions and acknowledgement of understanding      Vitals: (Last set prior to Anesthesia Care Transfer)    CRNA VITALS  1/16/2018 1554 - 1/16/2018 1632      1/16/2018             Pulse: 67    SpO2: 98 %                Electronically Signed By: VICTOR M Prado CRNA  January 16, 2018  4:32 PM

## 2018-01-16 NOTE — IP AVS SNAPSHOT
MRN:[id number]                      After Visit Summary   1/16/2018    Sonya Foote    MRN: [id number]           Thank you!     Thank you for choosing [facility] for your care. Our goal is always to provide you with excellent care. Hearing back from our patients is one way we can continue to improve our services. Please take a few minutes to complete the written survey that you may receive in the mail after you visit with us. Thank you!        Patient Information     Date Of Birth          1950        Designated Caregiver       Most Recent Value    Caregiver    Will someone help with your care after discharge? yes    Name of designated caregiver -- [lives in assisted care facility]      About your hospital stay     You were admitted on:  January 16, 2018 You last received care in the:  Unit 6D Observation [facility]    You were discharged on:  January 17, 2018        Reason for your hospital stay       You had a suprapubic catheter placed.                  Who to Call     For medical emergencies, please call [phone].  For non-urgent questions about your medical care, please call your primary care provider or clinic, [phone]  For questions related to your surgery, please call your surgery clinic        Attending Provider     Provider Specialty    Weight, Keanu Leone MD Urology       Primary Care Provider Office Phone # Fax #    Allan Casey [phone]       When to contact your care team       - Call or return sooner than your regularly scheduled visit if you develop any of the following: fever (greater than 101.5), uncontrolled pain, uncontrolled nausea or vomiting, as well as increased redness, swelling, or drainage from your wound.      Phone numbers:   - Monday through Friday 8am to 4:30pm:  Call [phone] with routine questions or to schedule or confirm appointment.    - For URGENT questions or concerns on Nights or weekends: call the after hours emergency  "pager - [phone] and tell the  \"I would like to page the Urology Resident on call.\"   - For emergencies, call 911                  After Care Instructions     Activity       Activity  - No strenuous exercise for 2 weeks.  - No lifting, pushing, pulling more than 10 pounds for 2 weeks.   - Do not strain with bowel movements.  - Do not drive until you can press the brake pedal quickly and fully without pain.   - Do not operate a motor vehicle while taking narcotic pain medications.            Diet       Return to your pre-procedure diet.  Drink plenty of fluids to keep urine clear.            Discharge Instructions       Medications  1. PAIN:   - Use over the counter acetaminophen (tylenol) or ibuprofen (motrin) for pain, follow the instructions on the bottle.   - do not take more than 4,000mg of Tylenol (acetaminophen) from all sources in any 24 hour period.  - Do not take more than 3200 mg of ibuprofen (motrin) from all sources in any 24 hour period.  - tylenol- codeine 10 pills are prescribed for your breakthrough postoperative pain. Use this as second line if acetaminophen or ibuprofen are ineffective.   - Transition from narcotic pain medications to tylenol (acetaminophen) as you are able.  Wean yourself off all pain medications as you are able.  - Narcotics can make you constipated.  Take over the counter fiber (metamucil or benefiber) and stool softeners (miralax, docusate or senna) while taking narcotic pain medications, but stop if you develop diarrhea.  - No driving or operating machinery while taking narcotic pain medications            Tubes and drains       You are going home with the following tubes or drains:      Suprapubic catheter.  - You are going home with a new suprapubic catheter.   - Protect the catheter and treat it like an extension of your body   - keep it secured to your abdomen with a cath secure and do not let it get caught, snagged, or tugged. The catheter tubing should remain " tension-free and without kinks. In order to drain best, the tubing and bag should always be lower than your body.  - You may notice a little blood, small amount of white discharge, or caking at the catheter insertion site. This is normal but it can irritate the skin so it is best to wash this off in the daily shower. You are encouraged to wash the catheter tubing to keep it clean, and the urine drainage bag also can be gotten wet.     - Do not remove the suprapubic tube. If it comes out, you must call IMMEDIATELY to have it replaced (tract will close off within 1-2 hours).   - Follow-up in 4-6 weeks for catheter exchange in urology clinic.            Wound care and dressings       Incisions  - You may shower and get incisions wet starting 48 hrs after surgery.  - Do not scrub incisions or submerge wounds for 2 weeks or until seen in follow-up.   - Remove wound dressing 48 hours after surgery.                  Follow-up Appointments     Adult [facility] Follow-up and recommended labs and tests       - Call your primary care provider to touch base regarding your recent admission.   - Follow up with Dr. Dawson's team in 4-6 weeks to have your first suprapubic tube change in Urology Clinic.     Appointments on [facility] and/or [facility] (with [facility] or [facility] provider or service). Call [phone] if you haven't heard regarding these appointments within 7 days of discharge.                  Your next 10 appointments already scheduled     Feb 08, 2018 11:30 AM CST   (Arrive by 11:15 AM)   Return Visit with VICTOR M Floyd CNP   [facility] ([facility])    [address] [place] [address]   [phone]            Feb 08, 2018  1:00 PM CST   (Arrive by 12:45 PM)   Post-Op with Keanu Dawson MD   [facility] ([facility])    [address] [place] [address]  Floor  [facility] [address]   [phone]            Feb 19, 2018  9:30 AM CST   (Arrive by 9:15 AM)   Return Visit with Alina Jennings MD   [facility] ([facility])    [address]  [address]  [facility] [address]   [phone]            Mar 06, 2018  9:15 AM CST   VISUAL FIELD with [facility] EYE VISUAL FIELD   Eye Clinic ([facility])    [address]  [address]  [address] Clin [address]  [facility] [address]   [phone]            Mar 06, 2018  9:45 AM CST   RETURN GLAUCOMA with Marely Robin MD   Eye Clinic ([facility])    [address]  [address]  [address] Clin [address]a  [facility] [address]   [phone]            May 11, 2018 10:50 AM CDT   (Arrive by 10:35 AM)   RETURN DIABETES with Michelle Irizarry MD   [facility] End[facility]inology ([facility])    [address]  3rd Floor  [facility] [address]   [phone]              Further instructions from your care team       [facility] [place]  Same-Day Surgery   Adult Discharge Orders & Instructions     For 24 hours after surgery    1. Get plenty of rest.  A responsible adult must stay with you for at least 24 hours after you leave the hospital.   2. Do not drive or use heavy equipment.  If you have weakness or tingling, don't drive or use heavy equipment until this feeling goes away.  3. Do not drink alcohol.  4. Avoid strenuous or risky activities.  Ask for help when climbing stairs.   5. You may feel lightheaded.  IF so, sit for a few minutes before standing.  Have someone help you get up.   6. If you have nausea (feel sick to your stomach): Drink only clear liquids such as apple juice, ginger ale, broth or 7-Up.  Rest may also help.  Be sure to drink enough fluids.  Move to a regular diet as you feel able.  7. You may have a slight fever. Call the doctor if  your fever is over 100 F (37.7 C) (taken under the tongue) or lasts longer than 24 hours.  8. You may have a dry mouth, a sore throat, muscle aches or trouble sleeping.  These should go away after 24 hours.  9. Do not make important or legal decisions.   Call your doctor for any of the followin.  Signs of infection (fever, growing tenderness at the surgery site, a large amount of drainage or bleeding, severe pain, foul-smelling drainage, redness, swelling).    2. It has been over 8 to 10 hours since surgery and you are still not able to urinate (pass water).    3.  Headache for over 24 hours.    4.  Numbness, tingling or weakness the day after surgery (if you had spinal anesthesia).  To contact a doctor, call ________________________________________ or:        [phone] and ask for the resident on call for   ______________________________________________ (answered 24 hours a day)      Emergency Department:    [facility]: [phone]       (TTY for hearing impaired: [phone])               Tips for taking pain medications  To get the best pain relief possible , remember these points:      Take pain medications as directed, before pain becomes severe      Pain medication can upset your stomach: taking it with food may help      Constipation is a common side effect of pain medication. Drink plenty of  Fluids      Eat foods high in fiber. Take a stool softener  if recommended by your doctor or  Pharmacist.        Do not drink alcohol, drive or operate machinery while taking pain medications.    Ask about other ways to control pain, such as with heat, ice or relaxation.         Pending Results     Date and Time Order Name Status Description    2018 1248 EKG 12-lead, tracing only Preliminary             Statement of Approval     Ordered          18 0858  I have reviewed and agree with all the recommendations and orders detailed in this document.  EFFECTIVE NOW     Approved and  "electronically signed by:  Saadia Baez PA             Admission Information     Date & Time Provider Department Dept. Phone    2018 Weight, Keanu Leone MD Unit 6D Observation [facility] [place] [phone]      Your Vitals Were     Blood Pressure Pulse Temperature Respirations Pulse Oximetry       130/66 (BP Location: Left arm) 59 98.4  F (36.9  C) (Oral) 16 97%       MyChart Information     [employer] lets you send messages to your doctor, view your test results, renew your prescriptions, schedule appointments and more. To sign up, go to [email or web address] . Click on \"Log in\" on the left side of the screen, which will take you to the Welcome page. Then click on \"Sign up Now\" on the right side of the page.     You will be asked to enter the access code listed below, as well as some personal information. Please follow the directions to create your username and password.     Your access code is: [email or web address]  Expires: 2018  9:57 AM     Your access code will  in 90 days. If you need help or a new code, please call your [facility] clinic or [phone].        Care EveryWhere ID     This is your Care EveryWhere ID. This could be used by other organizations to access your [facility] medical records  [id number]        Equal Access to Services     KARI CARREON : Hadii shaheen ku hadasho Soomaali, waaxda luqadaha, qaybta kaalmada adeegyada, tonie marroquin. So [facility] [phone].    ATENCIÓN: Si habla español, tiene a briones disposición servicios gratuitos de asistencia lingüística. Meño shultz [phone].    We comply with applicable federal civil rights laws and [place] laws. We do not discriminate on the basis of race, color, national origin, age, disability, sex, sexual orientation, or gender identity.               Review of your medicines      CONTINUE these medicines which may have CHANGED, or have new prescriptions. If we are uncertain of the size of tablets/capsules you " have at home, strength may be listed as something that might have changed.        Dose / Directions    atorvastatin 40 MG tablet   Commonly known as:  LIPITOR   This may have changed:  when to take this   Used for:  Hyperlipidemia LDL goal <100        Dose:  40 mg   Take 1 tablet (40 mg) by mouth daily   Quantity:  90 tablet   Refills:  3       brimonidine 0.2 % ophthalmic solution   Commonly known as:  ALPHAGAN   This may have changed:  when to take this   Used for:  Primary open angle glaucoma of both eyes, severe stage        Dose:  1 drop   Place 1 drop into the right eye 3 times daily   Quantity:  15 mL   Refills:  11       hydrochlorothiazide 12.5 MG capsule   Commonly known as:  MICROZIDE   This may have changed:  additional instructions   Used for:  Essential hypertension with goal blood pressure less than 130/80        Dose:  12.5 mg   Take 1 capsule (12.5 mg) by mouth daily   Quantity:  90 capsule   Refills:  3       pantoprazole 40 MG EC tablet   Commonly known as:  PROTONIX   This may have changed:  when to take this   Used for:  Gastroesophageal reflux disease without esophagitis        Dose:  40 mg   Take 1 tablet (40 mg) by mouth 2 times daily   Quantity:  30 tablet   Refills:  1       Phenytoin Sodium Extended 200 MG Caps   This may have changed:  additional instructions   Used for:  Seizure disorder (H)        Dose:  200 mg   Take 200 mg by mouth 2 times daily   Quantity:  60 capsule   Refills:  0         CONTINUE these medicines which have NOT CHANGED        Dose / Directions    ACETAMINOPHEN PO        Dose:  1000 mg   Take 1,000 mg by mouth every 4 hours as needed for fever Not to exceed 4000 mg/day   Refills:  0       acetaminophen-codeine 300-30 MG per tablet   Commonly known as:  TYLENOL #3   Used for:  Tension headache        Dose:  1 tablet   Take 1 tablet by mouth every 4 hours as needed for pain maximum 3 tablet(s) per day   Quantity:  10 tablet   Refills:  0       ADVAIR DISKUS 250-50  MCG/DOSE diskus inhaler   Generic drug:  fluticasone-salmeterol        Dose:  1 puff   Inhale 1 puff into the lungs 2 times daily   Refills:  0       * albuterol (2.5 MG/3ML) 0.083% neb solution   Used for:  Chronic obstructive pulmonary disease, unspecified COPD type (H)        INHALE 1 VIAL VIA NEBULIZER EVERY 6 HOURS AS NEEDED   Quantity:  360 mL   Refills:  11       * albuterol 108 (90 BASE) MCG/ACT Inhaler   Commonly known as:  PROAIR HFA/PROVENTIL HFA/VENTOLIN HFA   Used for:  JOSE (obstructive sleep apnea)        Dose:  2 puff   Inhale 2 puffs into the lungs every 6 hours as needed for shortness of breath / dyspnea or wheezing   Quantity:  3 Inhaler   Refills:  1       aspirin 81 MG EC tablet   Used for:  Unstable angina (H)        Dose:  81 mg   Take 1 tablet (81 mg) by mouth daily   Quantity:  90 tablet   Refills:  3       blood glucose monitoring lancets   Used for:  Type 2 diabetes mellitus with diabetic peripheral angiopathy without gangrene, without long-term current use of insulin (H)        Use to test blood sugar 3 times daily or as directed.   Quantity:  100 each   Refills:  PRN       blood glucose monitoring meter device kit   Used for:  Type 2 diabetes, HbA1C goal < 8% (H)        Use to test blood sugars 3 times daily or as directed.   Quantity:  1 kit   Refills:  0       blood glucose monitoring test strip   Commonly known as:  ONETOUCH ULTRA   Used for:  Type 2 diabetes mellitus with diabetic peripheral angiopathy without gangrene, without long-term current use of insulin (H)        Use to test blood sugars 3 times daily or as directed.   Quantity:  3 Box   Refills:  3       calcium citrate-vitamin D 315-250 MG-UNIT Tabs per tablet   Commonly known as:  CITRACAL   Used for:  Osteoporosis        Dose:  2 tablet   Take 2 tablets by mouth daily   Quantity:  120 tablet   Refills:  5       cetirizine 10 MG tablet   Commonly known as:  zyrTEC   Used for:  Seasonal allergic rhinitis, unspecified  allergic rhinitis trigger        Dose:  10 mg   Take 1 tablet (10 mg) by mouth daily as needed for allergies   Quantity:  90 tablet   Refills:  3       COMPAZINE PO        Dose:  10 mg   Take 10 mg by mouth every 8 hours as needed for nausea   Refills:  0       CYANOCOBALAMIN PO        Dose:  2000 mcg   Take 2,000 mcg by mouth daily   Refills:  0       diclofenac 1 % Gel topical gel   Commonly known as:  VOLTAREN   Used for:  Primary osteoarthritis of both hands        Dose:  2 g   Apply 2 g topically 4 times daily to hands   Quantity:  100 g   Refills:  11       dorzolamide 2 % ophthalmic solution   Commonly known as:  TRUSOPT        Dose:  1 drop   Place 1 drop into the right eye 2 times daily   Refills:  0       dorzolamide-timolol 2-0.5 % ophthalmic solution   Commonly known as:  COSOPT        Refills:  0       EPINEPHrine 0.15 MG/0.3ML injection 2-pack   Commonly known as:  EPIPEN JR   Used for:  Bee sting reaction, undetermined intent, subsequent encounter        Dose:  0.15 mg   Inject 0.3 mLs (0.15 mg) into the muscle as needed for anaphylaxis   Quantity:  0.6 mL   Refills:  1       escitalopram 10 MG tablet   Commonly known as:  LEXAPRO        Refills:  0       ESTRACE VAGINAL 0.1 MG/GM cream   Used for:  Atrophic vaginitis   Generic drug:  estradiol        USE DIME SIZE AMOUNT 3X A WEEK AT NIGHT   Quantity:  42.5 g   Refills:  11       estradiol 1 MG tablet   Commonly known as:  ESTRACE   Used for:  Person who has had sex change operation        Dose:  1 mg   Take 1 tablet (1 mg) by mouth daily   Quantity:  90 tablet   Refills:  3       ferrous sulfate 325 (65 FE) MG tablet   Commonly known as:  IRON        Dose:  325 mg   Take 1 tablet (325 mg) by mouth 2 times daily With meals   Quantity:  60 tablet   Refills:  2       fluticasone 50 MCG/ACT spray   Commonly known as:  FLONASE        Dose:  1 spray   Spray 1 spray into both nostrils daily   Refills:  0       INCRUSE ELLIPTA 62.5 MCG/INH oral inhaler    Generic drug:  umeclidinium        Dose:  1 puff   Inhale 1 puff into the lungs daily   Refills:  0       RENÉE Pack        Dose:  1 packet   Take 1 packet by mouth 2 times daily   Refills:  0       lactulose 10 GM/15ML solution   Commonly known as:  CHRONULAC        Dose:  20 g   Take 20 g by mouth as needed for constipation   Refills:  0       latanoprost 0.005 % ophthalmic solution   Commonly known as:  XALATAN   Used for:  Primary open angle glaucoma, stage unspecified        Dose:  1 drop   Place 1 drop into both eyes At Bedtime   Quantity:  2.5 mL   Refills:  11       levETIRAcetam 500 MG tablet   Commonly known as:  KEPPRA   Used for:  Nausea        Dose:  500 mg   Take 1 tablet (500 mg) by mouth 2 times daily   Quantity:  180 tablet   Refills:  1       lidocaine 5 % Patch   Commonly known as:  LIDODERM   Used for:  Chronic pain syndrome, 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 (H)        APPLY 1 TO 3 PATCHES TO PAINFUL AREA AT ONCE FOR UP TO 12 HOURS WITHIN A 24 HOUR PERIOD REMOVE AFTER 12 HOURS   Quantity:  30 patch   Refills:  5       lisinopril 10 MG tablet   Commonly known as:  PRINIVIL/ZESTRIL   Used for:  Essential hypertension with goal blood pressure less than 130/80        Dose:  10 mg   Take 1 tablet (10 mg) by mouth daily   Quantity:  90 tablet   Refills:  3       MEDICATION GIVEN BY INTRATHECAL PUMP - INSTRUCTION        continuous January 4, 2018  - per [facility] [place] [phone]: Conc: Bupivacaine 20 mg/mL and Fentanyl 2000 mcg/mL, morphine 2 mg/mL Continuous:  Fentanyl 795.9 mcg/day, Bupivacaine 7.759 mg/day, Morphine 0.7959 mg/day  Boluses: Up to 7 boluses per 24-hr period of Bupivicaine 0.599 mg and Fentanyl 59.9 mcg morphine 0.0599 mg  Pump Refill Date at [facility]: 3/1/18   Refills:  0       metFORMIN 500 MG 24 hr tablet   Commonly known as:  GLUCOPHAGE-XR   Used for:  Type 2 diabetes mellitus with diabetic peripheral  angiopathy and gangrene, without long-term current use of insulin (H)        Dose:  500 mg   Take 1 tablet (500 mg) by mouth daily (with dinner)   Quantity:  90 tablet   Refills:  3       metoprolol succinate 25 MG 24 hr tablet   Commonly known as:  TOPROL-XL   Used for:  Old myocardial infarction        TAKE 1 TABLET (25 MG) BY MOUTH DAILY   Quantity:  30 tablet   Refills:  10       MULTIVITAMIN PO        Dose:  1 tablet   Take 1 tablet by mouth daily   Refills:  0       nitroGLYcerin 0.4 MG sublingual tablet   Commonly known as:  NITROSTAT   Used for:  Chronic systolic congestive heart failure (H), Old myocardial infarction        Dose:  0.4 mg   Place 1 tablet (0.4 mg) under the tongue every 5 minutes as needed for chest pain if you are still having symptoms after 3 doses (15 minutes) call 911.   Quantity:  25 tablet   Refills:  1       ONDANSETRON PO        Dose:  4 mg   Take 4 mg by mouth 3 times daily   Refills:  0       * order for DME   Used for:  Incontinence        Equipment being ordered: Depends briefs   Quantity:  1 Month   Refills:  11       * order for DME   Used for:  CVA (cerebral infarction), HTN (hypertension)        Equipment being ordered: Power Wheelchair   Quantity:  1 Device   Refills:  0       * order for DME   Used for:  Type 2 diabetes mellitus with other diabetic neurological complication        Equipment being ordered: Glucerna daily shakes with each meal   Quantity:  1 Box   Refills:  11       * order for DME   Used for:  Simple chronic bronchitis (H)        Equipment being ordered: Nebulizer and tubing supplies   Quantity:  1 Units   Refills:  0       * order for DME   Used for:  Chronic obstructive pulmonary disease, unspecified COPD type (H)        Equipment being ordered: CPAP supplies mask, hose, filters, cushion fax to [facility] at [phone] Disp: 10 DeviceRefills: prn Class: Local PrintStart: 2/10/2017   Quantity:  1 Device   Refills:  0       * order for DME   Used for:   COPD (chronic obstructive pulmonary disease) (H)        Equipment being ordered: CPAP supplies mask, hose, filters, cushion  fax to [facility] at [phone]   Quantity:  10 Device   Refills:  prn       * order for DME   Used for:  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 (H)        Hospital bed with side rails   Quantity:  1 Device   Refills:  0       * order for DME   Used for:  Status post bilateral above knee amputation (H)        Full electric hospital bed with half rails  Dx: Q41626, I110, J449 Length of need: lifetime   Quantity:  1 Device   Refills:  0       * order for DME   Used for:  Status post bilateral above knee amputation (H)        Wheel Chair Cushion: 18 x 18 inch Roho cushion   Quantity:  1 Device   Refills:  0       polyethylene glycol Packet   Commonly known as:  MIRALAX/GLYCOLAX        Dose:  17 g   Take 17 g by mouth daily Dissolved in water or juice   Refills:  0       * pregabalin 75 MG capsule   Commonly known as:  LYRICA   Used for:  Pain in both upper extremities        Dose:  150 mg   Take 2 capsules (150 mg) by mouth 2 times daily   Quantity:  120 capsule   Refills:  5       * LYRICA 150 MG capsule   Generic drug:  pregabalin        Refills:  0       risperiDONE 0.5 MG tablet   Commonly known as:  risperDAL        Dose:  0.5 mg   Take 0.5 mg by mouth At Bedtime   Refills:  0       SPIRIVA HANDIHALER 18 MCG capsule   Generic drug:  tiotropium        Refills:  0       sucralfate 1 GM tablet   Commonly known as:  CARAFATE   Used for:  Adjustment disorder with depressed mood        Dose:  1 g   Take 1 tablet (1 g) by mouth 4 times daily May dissolve in 10 mL water is necessary. (Start upon completion of carafate suspension)   Quantity:  120 tablet   Refills:  11       * traZODone 100 MG tablet   Commonly known as:  DESYREL   Used for:  Anxiety state        Dose:  150 mg   Take 1.5 tablets (150 mg) by mouth At Bedtime   Quantity:  90 tablet   Refills:  3       *  traZODone 50 MG tablet   Commonly known as:  DESYREL        Refills:  0       vitamin D 2000 UNITS tablet        Dose:  2000 Units   Take 2,000 Units by mouth daily.   Quantity:  100 tablet   Refills:  3       zinc 50 MG Tabs        Dose:  1 tablet   Take 1 tablet by mouth daily   Refills:  0       * Notice:  This list has 15 medication(s) that are the same as other medications prescribed for you. Read the directions carefully, and ask your doctor or other care provider to review them with you.         Where to get your medicines      Some of these will need a paper prescription and others can be bought over the counter. Ask your nurse if you have questions.     Bring a paper prescription for each of these medications     acetaminophen-codeine 300-30 MG per tablet                Protect others around you: Learn how to safely use, store and throw away your medicines at www.disposemymeds.org.             Medication List: This is a list of all your medications and when to take them. Check marks below indicate your daily home schedule. Keep this list as a reference.      Medications           Morning Afternoon Evening Bedtime As Needed    ACETAMINOPHEN PO   Take 1,000 mg by mouth every 4 hours as needed for fever Not to exceed 4000 mg/day                                acetaminophen-codeine 300-30 MG per tablet   Commonly known as:  TYLENOL #3   Take 1 tablet by mouth every 4 hours as needed for pain maximum 3 tablet(s) per day                                ADVAIR DISKUS 250-50 MCG/DOSE diskus inhaler   Inhale 1 puff into the lungs 2 times daily   Generic drug:  fluticasone-salmeterol                                * albuterol (2.5 MG/3ML) 0.083% neb solution   INHALE 1 VIAL VIA NEBULIZER EVERY 6 HOURS AS NEEDED                                * albuterol 108 (90 BASE) MCG/ACT Inhaler   Commonly known as:  PROAIR HFA/PROVENTIL HFA/VENTOLIN HFA   Inhale 2 puffs into the lungs every 6 hours as needed for shortness of  breath / dyspnea or wheezing                                aspirin 81 MG EC tablet   Take 1 tablet (81 mg) by mouth daily                                atorvastatin 40 MG tablet   Commonly known as:  LIPITOR   Take 1 tablet (40 mg) by mouth daily                                blood glucose monitoring lancets   Use to test blood sugar 3 times daily or as directed.                                blood glucose monitoring meter device kit   Use to test blood sugars 3 times daily or as directed.                                blood glucose monitoring test strip   Commonly known as:  ONETOUCH ULTRA   Use to test blood sugars 3 times daily or as directed.                                brimonidine 0.2 % ophthalmic solution   Commonly known as:  ALPHAGAN   Place 1 drop into the right eye 3 times daily                                calcium citrate-vitamin D 315-250 MG-UNIT Tabs per tablet   Commonly known as:  CITRACAL   Take 2 tablets by mouth daily                                cetirizine 10 MG tablet   Commonly known as:  zyrTEC   Take 1 tablet (10 mg) by mouth daily as needed for allergies                                COMPAZINE PO   Take 10 mg by mouth every 8 hours as needed for nausea                                CYANOCOBALAMIN PO   Take 2,000 mcg by mouth daily                                diclofenac 1 % Gel topical gel   Commonly known as:  VOLTAREN   Apply 2 g topically 4 times daily to hands                                dorzolamide 2 % ophthalmic solution   Commonly known as:  TRUSOPT   Place 1 drop into the right eye 2 times daily                                dorzolamide-timolol 2-0.5 % ophthalmic solution   Commonly known as:  COSOPT                                EPINEPHrine 0.15 MG/0.3ML injection 2-pack   Commonly known as:  EPIPEN JR   Inject 0.3 mLs (0.15 mg) into the muscle as needed for anaphylaxis                                escitalopram 10 MG tablet   Commonly known as:  LEXAPRO   Last  time this was given:  10 mg on 1/16/2018  9:36 PM                                ESTRACE VAGINAL 0.1 MG/GM cream   USE DIME SIZE AMOUNT 3X A WEEK AT NIGHT   Generic drug:  estradiol                                estradiol 1 MG tablet   Commonly known as:  ESTRACE   Take 1 tablet (1 mg) by mouth daily                                ferrous sulfate 325 (65 FE) MG tablet   Commonly known as:  IRON   Take 1 tablet (325 mg) by mouth 2 times daily With meals                                fluticasone 50 MCG/ACT spray   Commonly known as:  FLONASE   Spray 1 spray into both nostrils daily                                hydrochlorothiazide 12.5 MG capsule   Commonly known as:  MICROZIDE   Take 1 capsule (12.5 mg) by mouth daily   Last time this was given:  12.5 mg on 1/17/2018  7:48 AM                                INCRUSE ELLIPTA 62.5 MCG/INH oral inhaler   Inhale 1 puff into the lungs daily   Last time this was given:  1 puff on 1/17/2018  7:49 AM   Generic drug:  umeclidinium                                RENÉE Pack   Take 1 packet by mouth 2 times daily                                lactulose 10 GM/15ML solution   Commonly known as:  CHRONULAC   Take 20 g by mouth as needed for constipation                                latanoprost 0.005 % ophthalmic solution   Commonly known as:  XALATAN   Place 1 drop into both eyes At Bedtime                                levETIRAcetam 500 MG tablet   Commonly known as:  KEPPRA   Take 1 tablet (500 mg) by mouth 2 times daily   Last time this was given:  500 mg on 1/17/2018  7:49 AM                                lidocaine 5 % Patch   Commonly known as:  LIDODERM   APPLY 1 TO 3 PATCHES TO PAINFUL AREA AT ONCE FOR UP TO 12 HOURS WITHIN A 24 HOUR PERIOD REMOVE AFTER 12 HOURS                                lisinopril 10 MG tablet   Commonly known as:  PRINIVIL/ZESTRIL   Take 1 tablet (10 mg) by mouth daily   Last time this was given:  10 mg on 1/17/2018  7:48 AM                                 MEDICATION GIVEN BY INTRATHECAL PUMP - INSTRUCTION   continuous January 4, 2018  - per [facility] [place] [phone]: Conc: Bupivacaine 20 mg/mL and Fentanyl 2000 mcg/mL, morphine 2 mg/mL Continuous:  Fentanyl 795.9 mcg/day, Bupivacaine 7.759 mg/day, Morphine 0.7959 mg/day  Boluses: Up to 7 boluses per 24-hr period of Bupivicaine 0.599 mg and Fentanyl 59.9 mcg morphine 0.0599 mg  Pump Refill Date at [facility]: 3/1/18                                metFORMIN 500 MG 24 hr tablet   Commonly known as:  GLUCOPHAGE-XR   Take 1 tablet (500 mg) by mouth daily (with dinner)                                metoprolol succinate 25 MG 24 hr tablet   Commonly known as:  TOPROL-XL   TAKE 1 TABLET (25 MG) BY MOUTH DAILY   Last time this was given:  25 mg on 1/17/2018  7:48 AM                                MULTIVITAMIN PO   Take 1 tablet by mouth daily                                nitroGLYcerin 0.4 MG sublingual tablet   Commonly known as:  NITROSTAT   Place 1 tablet (0.4 mg) under the tongue every 5 minutes as needed for chest pain if you are still having symptoms after 3 doses (15 minutes) call 911.                                ONDANSETRON PO   Take 4 mg by mouth 3 times daily   Last time this was given:  4 mg on 1/17/2018  7:48 AM                                * order for DME   Equipment being ordered: Depends briefs                                * order for DME   Equipment being ordered: Power Wheelchair                                * order for DME   Equipment being ordered: Glucerna daily shakes with each meal                                * order for DME   Equipment being ordered: Nebulizer and tubing supplies                                * order for DME   Equipment being ordered: CPAP supplies mask, hose, filters, cushion fax to [facility] at [phone] Disp: 10 DeviceRefills: prn Class: Local PrintStart: 2/10/2017                                * order for  DME   Equipment being ordered: CPAP supplies mask, hose, filters, cushion  fax to [facility] at [phone]                                * order for DME   Hospital bed with side rails                                * order for DME   Full electric hospital bed with half rails  Dx: W37672, I110, J449 Length of need: lifetime                                * order for DME   Wheel Chair Cushion: 18 x 18 inch Roho cushion                                pantoprazole 40 MG EC tablet   Commonly known as:  PROTONIX   Take 1 tablet (40 mg) by mouth 2 times daily   Last time this was given:  40 mg on 1/17/2018  7:48 AM                                Phenytoin Sodium Extended 200 MG Caps   Take 200 mg by mouth 2 times daily   Last time this was given:  200 mg on 1/17/2018  7:48 AM                                polyethylene glycol Packet   Commonly known as:  MIRALAX/GLYCOLAX   Take 17 g by mouth daily Dissolved in water or juice                                * pregabalin 75 MG capsule   Commonly known as:  LYRICA   Take 2 capsules (150 mg) by mouth 2 times daily   Last time this was given:  150 mg on 1/17/2018  7:48 AM                                * LYRICA 150 MG capsule   Last time this was given:  150 mg on 1/17/2018  7:48 AM   Generic drug:  pregabalin                                risperiDONE 0.5 MG tablet   Commonly known as:  risperDAL   Take 0.5 mg by mouth At Bedtime   Last time this was given:  0.5 mg on 1/16/2018  9:35 PM                                SPIRIVA HANDIHALER 18 MCG capsule   Generic drug:  tiotropium                                sucralfate 1 GM tablet   Commonly known as:  CARAFATE   Take 1 tablet (1 g) by mouth 4 times daily May dissolve in 10 mL water is necessary. (Start upon completion of carafate suspension)                                * traZODone 100 MG tablet   Commonly known as:  DESYREL   Take 1.5 tablets (150 mg) by mouth At Bedtime   Last time this was given:  150 mg on  1/16/2018  9:34 PM                                * traZODone 50 MG tablet   Commonly known as:  DESYREL   Last time this was given:  150 mg on 1/16/2018  9:34 PM                                vitamin D 2000 UNITS tablet   Take 2,000 Units by mouth daily.                                zinc 50 MG Tabs   Take 1 tablet by mouth daily                                * Notice:  This list has 15 medication(s) that are the same as other medications prescribed for you. Read the directions carefully, and ask your doctor or other care provider to review them with you.

## 2018-01-16 NOTE — IP AVS SNAPSHOT
Unit 6D Observation [address] [address]    Phone:  [phone]    Fax:  [phone]                                       After Visit Summary   1/16/2018    Sonya Foote    MRN: [id number]           After Visit Summary Signature Page     I have received my discharge instructions, and my questions have been answered. I have discussed any challenges I see with this plan with the nurse or doctor.    ..........................................................................................................................................  Patient/Patient Representative Signature      ..........................................................................................................................................  Patient Representative Print Name and Relationship to Patient    ..................................................               ................................................  Date                                            Time    ..........................................................................................................................................  Reviewed by Signature/Title    ...................................................              ..............................................  Date                                                            Time

## 2018-01-16 NOTE — ADDENDUM NOTE
Encounter addended by: Herberth Castrejon MD on: 1/16/2018 10:04 AM<BR>     Actions taken: Results reviewed in IB, Letter status changed

## 2018-01-16 NOTE — PROGRESS NOTES
Attempted to reach pt via phone. Mailbox is full. Will try again tomorrow. Confirmed  with pharmacy that medication was approved and sent out to pt.  Will try again tomorrow.        Sachi - I send Sonya fluconazole and sent her a letter. However med will probably be ready prior to get getting her letter.  Can you call and and let her know biopsies had candida esophagitis - will treat for 2 weeks - 100mg dialy

## 2018-01-16 NOTE — DISCHARGE INSTRUCTIONS
[facility]  Same-Day Surgery   Adult Discharge Orders & Instructions     For 24 hours after surgery    1. Get plenty of rest.  A responsible adult must stay with you for at least 24 hours after you leave the hospital.   2. Do not drive or use heavy equipment.  If you have weakness or tingling, don't drive or use heavy equipment until this feeling goes away.  3. Do not drink alcohol.  4. Avoid strenuous or risky activities.  Ask for help when climbing stairs.   5. You may feel lightheaded.  IF so, sit for a few minutes before standing.  Have someone help you get up.   6. If you have nausea (feel sick to your stomach): Drink only clear liquids such as apple juice, ginger ale, broth or 7-Up.  Rest may also help.  Be sure to drink enough fluids.  Move to a regular diet as you feel able.  7. You may have a slight fever. Call the doctor if your fever is over 100 F (37.7 C) (taken under the tongue) or lasts longer than 24 hours.  8. You may have a dry mouth, a sore throat, muscle aches or trouble sleeping.  These should go away after 24 hours.  9. Do not make important or legal decisions.   Call your doctor for any of the followin.  Signs of infection (fever, growing tenderness at the surgery site, a large amount of drainage or bleeding, severe pain, foul-smelling drainage, redness, swelling).    2. It has been over 8 to 10 hours since surgery and you are still not able to urinate (pass water).    3.  Headache for over 24 hours.    4.  Numbness, tingling or weakness the day after surgery (if you had spinal anesthesia).  To contact a doctor, call ________________________________________ or:        [phone] and ask for the resident on call for   ______________________________________________ (answered 24 hours a day)      Emergency Department:    [facility]: [phone]       (TTY for hearing impaired: [phone])               Tips for taking pain medications  To  get the best pain relief possible , remember these points:      Take pain medications as directed, before pain becomes severe      Pain medication can upset your stomach: taking it with food may help      Constipation is a common side effect of pain medication. Drink plenty of  Fluids      Eat foods high in fiber. Take a stool softener  if recommended by your doctor or  Pharmacist.        Do not drink alcohol, drive or operate machinery while taking pain medications.    Ask about other ways to control pain, such as with heat, ice or relaxation.

## 2018-01-17 ENCOUNTER — CARE COORDINATION (OUTPATIENT)
Dept: GASTROENTEROLOGY | Facility: CLINIC | Age: 68
End: 2018-01-17

## 2018-01-17 VITALS
RESPIRATION RATE: 16 BRPM | HEART RATE: 59 BPM | OXYGEN SATURATION: 97 % | TEMPERATURE: 98.4 F | DIASTOLIC BLOOD PRESSURE: 66 MMHG | SYSTOLIC BLOOD PRESSURE: 130 MMHG

## 2018-01-17 LAB
GLUCOSE BLDC GLUCOMTR-MCNC: 81 MG/DL (ref 70–99)
INTERPRETATION ECG - MUSE: NORMAL

## 2018-01-17 PROCEDURE — 25000125 ZZHC RX 250: Performed by: STUDENT IN AN ORGANIZED HEALTH CARE EDUCATION/TRAINING PROGRAM

## 2018-01-17 PROCEDURE — 25000132 ZZH RX MED GY IP 250 OP 250 PS 637: Performed by: STUDENT IN AN ORGANIZED HEALTH CARE EDUCATION/TRAINING PROGRAM

## 2018-01-17 RX ADMIN — LEVETIRACETAM 500 MG: 500 TABLET ORAL at 07:49

## 2018-01-17 RX ADMIN — OXYCODONE HYDROCHLORIDE 5 MG: 5 TABLET ORAL at 06:49

## 2018-01-17 RX ADMIN — UMECLIDINIUM 1 PUFF: 62.5 AEROSOL, POWDER ORAL at 07:49

## 2018-01-17 RX ADMIN — ONDANSETRON 4 MG: 4 TABLET, ORALLY DISINTEGRATING ORAL at 07:48

## 2018-01-17 RX ADMIN — HYDROCHLOROTHIAZIDE 12.5 MG: 12.5 CAPSULE ORAL at 07:48

## 2018-01-17 RX ADMIN — PREGABALIN 150 MG: 75 CAPSULE ORAL at 07:48

## 2018-01-17 RX ADMIN — LISINOPRIL 10 MG: 10 TABLET ORAL at 07:48

## 2018-01-17 RX ADMIN — FLUTICASONE FUROATE AND VILANTEROL TRIFENATATE 1 PUFF: 200; 25 POWDER RESPIRATORY (INHALATION) at 07:49

## 2018-01-17 RX ADMIN — PHENYTOIN SODIUM 200 MG: 100 CAPSULE, EXTENDED RELEASE ORAL at 07:48

## 2018-01-17 RX ADMIN — METOPROLOL SUCCINATE 25 MG: 25 TABLET, EXTENDED RELEASE ORAL at 07:48

## 2018-01-17 RX ADMIN — PANTOPRAZOLE SODIUM 40 MG: 40 TABLET, DELAYED RELEASE ORAL at 07:48

## 2018-01-17 NOTE — ANESTHESIA POSTPROCEDURE EVALUATION
Patient: Sonya Foote    Procedure(s):  Cystoscopy, Intraoperative Ultrasound, Suprapubic Tube Placement - Wound Class: II-Clean Contaminated    Diagnosis:Functional Incontinence   Diagnosis Additional Information: No value filed.    Anesthesia Type:  General, ETT    Note:  Anesthesia Post Evaluation    Patient location during evaluation: Phase 2  Patient participation: Able to fully participate in evaluation  Level of consciousness: awake and alert  Pain management: adequate  Airway patency: patent  Cardiovascular status: acceptable  Respiratory status: acceptable  Hydration status: acceptable  PONV: none       Comments: BG 69. Given sugar, and apple juice. Denies pain and nausea.         Last vitals:  Vitals:    01/16/18 1626 01/16/18 1645 01/16/18 1700   BP: (!) 173/95 181/83    Resp: 16     Temp: 36.2  C (97.2  F)     SpO2:  100% 100%         Electronically Signed By: Huan Richard MD  January 16, 2018  6:00 PM

## 2018-01-17 NOTE — PROGRESS NOTES
Care Coordinator Progress Note     Admission Date/Time:  1/16/2018  Attending MD:  Keanu Dawson*     Data  Chart reviewed, discussed with interdisciplinary team.   Patient was admitted for: Tension headache.    Concerns with insurance coverage for discharge needs: None.  Current Living Situation: Patient lives in an assisted living facility.  Support System: Supportive and Involved  Services Involved: Home Care  Transportation: MA transportation,  [employer] [facility] [phone]  Barriers to Discharge: chronically ill, dependent with mobility/activities of daily living and physical impairment    Coordination of Care and Referrals: Provided patient/family with options for discussion of current living situation, [facility] and transportation.      Assessment   At pt bedside to discuss RNCC role and pt current living situation. Pt states she resides at [facility] and relies on her mechanical wheelchair. Sonya states she has nursing and PT services already in place with [facility]. Writer inquired with Keshia BROOKE [facility] liaison and she states this is true and that nursing would be able to assess her suprapubic catheter. Writer called [facility] to establish transport for pt back to [facility]; ETA within the hour ([phone]). Primary RN Amee and pt informed of this.     [facility] ([phone])  [facility] ([phone])     Plan  Anticipated Discharge Date: 01/17/18  Anticipated Discharge Plan: DC with provider recommendations.     Kiley Alarcon RN [facility] ED/6D Obs  [phone]

## 2018-01-17 NOTE — PROGRESS NOTES
1/16/17: CM received notification of admission for suprapubic catheter placement.   Will continue to follow Epic notes.     Jamie Sharma RN, N  [facility]

## 2018-01-17 NOTE — PROGRESS NOTES
Pt alert and oriented x 4, denies pain now. Tolerating diet well. AVSS, on RA. SP catheter patent, draining straw colored urine.SP cath site dressing with scant dried blood. Will continue to monitor.  /66 (BP Location: Left arm)  Pulse 59  Temp 98.4  F (36.9  C) (Oral)  Resp 16  SpO2 97%

## 2018-01-17 NOTE — PROGRESS NOTES
SPIRITUAL HEALTH SERVICES  SPIRITUAL ASSESSMENT Progress Note  [facility] ([place]) 6D     PRIMARY FOCUS:     Emotional/spiritual/Mosque distress    REFERRAL SOURCE: EPIC Referral    ILLNESS CIRCUMSTANCES:   Reviewed documentation. Reflective conversation shared with Sonya which integrated elements of illness and family narratives.     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    Resources for Support - Sonya said that she has a son and a daughter who live in the area and that both have jobs working with computers.  She said that her son has helped her in many ways over the past year since her legs were amputated.  Sonya also mentioned that she finds a lot of support from her assisted living community and from her Catholic, [employer] in [place].     DISTRESS:     Emotional/Spiritual/Existential Distress - Sonya said that she is having a hard time dealing with the loss of her spouse Toña.  She said she feels like she's had so many health issues and wonders why it has to be her.  She told me she wonders why it had to be her spouse who  instead of her, because she was always the sick one.    Sikhism Distress - Not Discussed    Social/Cultural/Economic Distress - Sonya named loss of her spouse as very difficult for her, especially with the loss of her legs last year.    SPIRITUAL/Orthodoxy COPING:     Yazidi/Lilli - Sonya stated that she is Lutheran and enjoys going to the service each  and the community support she receives there.  She said the Catholic had a memorial lunch for her spouse Toña after she passed away and that meant a lot to her.    Spiritual Practice(s) - Sonya mentioned that she has been praying a lot lately.    Emotional/Relational/Existential Connections - Sonya said that she has found a lot of  community connections in her assisted living facility.  She said that there were over 60 people gathered this past Sunday watching the [employer] game.     GOALS OF CARE:    Goals of Care - Sonya said she is being discharged today and is excited to go to [employer] this Sunday.    PLAN: No plans for follow up at this time.    Anjel Ledesma   Intern  Pager [phone]

## 2018-01-17 NOTE — DISCHARGE SUMMARY
[facility] Discharge Summary    Patient: Sonya Foote    MRN: [id number]   : 1950         Date of Admission:  2018   Date of Discharge::  2018  Admitting Physician:  Keanu Dawson MD  Discharge Physician:  Saadia Baez PAJoeC             Admission Diagnoses:   Functional Urinary Incontinence     Past Medical History:   Diagnosis Date     Anemia      Antiplatelet or antithrombotic long-term use      Arthritis      AS (sickle cell trait) (H) 10/8/2013     Blind left eye      BPPV (benign paroxysmal positional vertigo)      Chronic back pain      COPD (chronic obstructive pulmonary disease) (H)      Coronary artery disease      CVA (cerebral infarction) 10/8/2012    x3, residual left sided weakness     Diastolic CHF (H) 2012     Dilantin toxicity 2013     Esophageal candidiasis (H)      GERD (gastroesophageal reflux disease)      Heart attack      Hernia, abdominal      History of blood transfusion     x5     History of thrombophlebitis      HTN (hypertension) 2012     Hyperlipidemia LDL goal <100 2012     Legally blind in right eye, as defined in [place]     No periphal vision right eye     Osteoporosis 2013     Other chronic pain      PAD (peripheral artery disease) (H)      Palpitations      Person who has had sex change operation 2014     Pneumonia      Schizoaffective disorder (H)      Seizure disorder (H) 2012     Sleep apnea     CPAP     Thrombosis of leg      Type 2 diabetes, HbA1C goal < 8% (H) 2012     Uncomplicated asthma      Unspecified cerebral artery occlusion with cerebral infarction              Discharge Diagnosis:   Functional Urinary Incontinence     Past Medical History:   Diagnosis Date     Anemia      Antiplatelet or antithrombotic long-term use      Arthritis      AS (sickle cell trait) (H) 10/8/2013     Blind left eye      BPPV (benign paroxysmal positional vertigo)      Chronic back pain      COPD (chronic obstructive  pulmonary disease) (H)      Coronary artery disease      CVA (cerebral infarction) 10/8/2012    x3, residual left sided weakness     Diastolic CHF (H) 9/4/2012     Dilantin toxicity 5/31/2013     Esophageal candidiasis (H)      GERD (gastroesophageal reflux disease)      Heart attack      Hernia, abdominal      History of blood transfusion     x5     History of thrombophlebitis      HTN (hypertension) 9/4/2012     Hyperlipidemia LDL goal <100 9/4/2012     Legally blind in right eye, as defined in [place]     No periphal vision right eye     Osteoporosis 1/21/2013     Other chronic pain      PAD (peripheral artery disease) (H)      Palpitations      Person who has had sex change operation 1/20/2014     Pneumonia      Schizoaffective disorder (H)      Seizure disorder (H) 9/4/2012     Sleep apnea     CPAP     Thrombosis of leg      Type 2 diabetes, HbA1C goal < 8% (H) 9/4/2012     Uncomplicated asthma      Unspecified cerebral artery occlusion with cerebral infarction             Procedures:   Procedure(s): 1/16/18 -   PROCEDURE:   1) Cystourethroscopy  2) Placement of suprapubic cystotomy tube  3) Intraoperative ultrasound and interpretation.   Dr. Keanu Dawson (Urology)            Medications Prior to Admission:     Prescriptions Prior to Admission   Medication Sig Dispense Refill Last Dose     escitalopram (LEXAPRO) 10 MG tablet    1/15/2018 at Unknown time     dorzolamide-timolol (COSOPT) 2-0.5 % ophthalmic solution    1/16/2018 at Unknown time     LYRICA 150 MG capsule    1/16/2018 at Unknown time     SPIRIVA HANDIHALER 18 MCG capsule    1/16/2018 at Unknown time     traZODone (DESYREL) 50 MG tablet    1/15/2018 at Unknown time     lactulose (CHRONULAC) 10 GM/15ML solution Take 20 g by mouth as needed for constipation   1/15/2018 at Unknown time     Prochlorperazine Maleate (COMPAZINE PO) Take 10 mg by mouth every 8 hours as needed for nausea   Past Week at Unknown time     ESTRACE VAGINAL 0.1 MG/GM cream USE  DIME SIZE AMOUNT 3X A WEEK AT NIGHT 42.5 g 11 1/15/2018 at Unknown time     metFORMIN (GLUCOPHAGE-XR) 500 MG 24 hr tablet Take 1 tablet (500 mg) by mouth daily (with dinner) 90 tablet 3 1/15/2018 at Unknown time     brimonidine (ALPHAGAN) 0.2 % ophthalmic solution Place 1 drop into the right eye 3 times daily (Patient taking differently: Place 1 drop into the right eye 2 times daily ) 15 mL 11 1/16/2018 at Unknown time     sucralfate (CARAFATE) 1 GM tablet Take 1 tablet (1 g) by mouth 4 times daily May dissolve in 10 mL water is necessary. (Start upon completion of carafate suspension) 120 tablet 11 1/16/2018 at Unknown time     albuterol (PROAIR HFA/PROVENTIL HFA/VENTOLIN HFA) 108 (90 BASE) MCG/ACT Inhaler Inhale 2 puffs into the lungs every 6 hours as needed for shortness of breath / dyspnea or wheezing 3 Inhaler 1 Past Week at Unknown time     umeclidinium (INCRUSE ELLIPTA) 62.5 MCG/INH oral inhaler Inhale 1 puff into the lungs daily   1/16/2018 at Unknown time     ONDANSETRON PO Take 4 mg by mouth 3 times daily   1/16/2018 at Unknown time     lidocaine (LIDODERM) 5 % Patch APPLY 1 TO 3 PATCHES TO PAINFUL AREA AT ONCE FOR UP TO 12 HOURS WITHIN A 24 HOUR PERIOD REMOVE AFTER 12 HOURS 30 patch 5 1/15/2018 at Unknown time     metoprolol (TOPROL-XL) 25 MG 24 hr tablet TAKE 1 TABLET (25 MG) BY MOUTH DAILY 30 tablet 10 1/16/2018 at Unknown time     polyethylene glycol (MIRALAX/GLYCOLAX) Packet Take 17 g by mouth daily Dissolved in water or juice    Past Week at Unknown time     ACETAMINOPHEN PO Take 1,000 mg by mouth every 4 hours as needed for fever Not to exceed 4000 mg/day   Past Week at Unknown time     pregabalin (LYRICA) 75 MG capsule Take 2 capsules (150 mg) by mouth 2 times daily 120 capsule 5 1/16/2018 at Unknown time     cetirizine (ZYRTEC) 10 MG tablet Take 1 tablet (10 mg) by mouth daily as needed for allergies 90 tablet 3 1/15/2018 at Unknown time     diclofenac (VOLTAREN) 1 % GEL topical gel Apply 2 g  topically 4 times daily to hands 100 g 11 1/16/2018 at Unknown time     EPINEPHrine (EPIPEN JR) 0.15 MG/0.3ML injection Inject 0.3 mLs (0.15 mg) into the muscle as needed for anaphylaxis 0.6 mL 1 Past Month at Unknown time     lisinopril (PRINIVIL/ZESTRIL) 10 MG tablet Take 1 tablet (10 mg) by mouth daily 90 tablet 3 1/16/2018 at Unknown time     risperiDONE (RISPERDAL) 0.5 MG tablet Take 0.5 mg by mouth At Bedtime   1/15/2018 at Unknown time     ferrous sulfate (IRON) 325 (65 FE) MG tablet Take 1 tablet (325 mg) by mouth 2 times daily With meals 60 tablet 2 1/15/2018 at Unknown time     albuterol (2.5 MG/3ML) 0.083% neb solution INHALE 1 VIAL VIA NEBULIZER EVERY 6 HOURS AS NEEDED 360 mL 11 1/16/2018 at Unknown time     Nutritional Supplements (RENÉE) PACK Take 1 packet by mouth 2 times daily   1/15/2018 at Unknown time     fluticasone (FLONASE) 50 MCG/ACT nasal spray Spray 1 spray into both nostrils daily   1/16/2018 at Unknown time     ADVAIR DISKUS 250-50 MCG/DOSE diskus inhaler Inhale 1 puff into the lungs 2 times daily    1/16/2018 at Unknown time     calcium citrate-vitamin D (CITRACAL) 315-250 MG-UNIT TABS Take 2 tablets by mouth daily 120 tablet 5 1/15/2018 at Unknown time     hydrochlorothiazide (MICROZIDE) 12.5 MG capsule Take 1 capsule (12.5 mg) by mouth daily (Patient taking differently: Take 12.5 mg by mouth daily Hold for sbp<90) 90 capsule 3 1/16/2018 at Unknown time     estradiol (ESTRACE) 1 MG tablet Take 1 tablet (1 mg) by mouth daily 90 tablet 3 1/15/2018 at Unknown time     levETIRAcetam (KEPPRA) 500 MG tablet Take 1 tablet (500 mg) by mouth 2 times daily 180 tablet 1 1/16/2018 at Unknown time     aspirin EC 81 MG EC tablet Take 1 tablet (81 mg) by mouth daily 90 tablet 3 1/15/2018 at Unknown time     phenytoin 200 MG CAPS Take 200 mg by mouth 2 times daily (Patient taking differently: Take 200 mg by mouth 2 times daily (takes at 8 AM and 8 PM)) 60 capsule 0 1/16/2018 at Unknown time      dorzolamide (TRUSOPT) 2 % ophthalmic solution Place 1 drop into the right eye 2 times daily    1/16/2018 at Unknown time     zinc 50 MG TABS Take 1 tablet by mouth daily   1/15/2018 at Unknown time     nitroglycerin (NITROSTAT) 0.4 MG SL tablet Place 1 tablet (0.4 mg) under the tongue every 5 minutes as needed for chest pain if you are still having symptoms after 3 doses (15 minutes) call 911. 25 tablet 1 Past Month at Unknown time     latanoprost (XALATAN) 0.005 % ophthalmic solution Place 1 drop into both eyes At Bedtime 2.5 mL 11 1/15/2018 at Unknown time     atorvastatin (LIPITOR) 40 MG tablet Take 1 tablet (40 mg) by mouth daily (Patient taking differently: Take 40 mg by mouth At Bedtime ) 90 tablet 3 1/15/2018 at Unknown time     Cholecalciferol (VITAMIN D) 2000 UNITS tablet Take 2,000 Units by mouth daily. 100 tablet 3 1/15/2018 at Unknown time     Multiple Vitamin (MULTIVITAMIN OR) Take 1 tablet by mouth daily    1/15/2018 at Unknown time     pantoprazole (PROTONIX) 40 MG EC tablet Take 1 tablet (40 mg) by mouth 2 times daily (Patient taking differently: Take 40 mg by mouth daily ) 30 tablet 1 Taking     blood glucose monitoring (ONE TOUCH ULTRASOFT) lancets Use to test blood sugar 3 times daily or as directed. 100 each PRN Taking     blood glucose monitoring (ONE TOUCH ULTRA) test strip Use to test blood sugars 3 times daily or as directed. 3 Box 3 Taking     traZODone (DESYREL) 100 MG tablet Take 1.5 tablets (150 mg) by mouth At Bedtime 90 tablet 3 Taking     order for DME Full electric hospital bed with half rails    Dx: [id number], I110, J449  Length of need: lifetime 1 Device 0 Taking     order for DME Wheel Chair Cushion: 18 x 18 inch Roho cushion 1 Device 0 Taking     order for DME Hospital bed with side rails 1 Device 0 Taking     order for DME Equipment being ordered: CPAP supplies mask, hose, filters, cushion    fax to [facility] at [phone] 10 Device prn Taking     order for DME Equipment  being ordered: CPAP supplies mask, hose, filters, cushion fax to [facility] at [phone]  Disp: 10 Device Refills: prn   Class: Local Print Start: 2/10/2017 1 Device 0 Taking     order for DME Equipment being ordered: Nebulizer and tubing supplies 1 Units 0 Taking     CYANOCOBALAMIN PO Take 2,000 mcg by mouth daily   Taking     blood glucose monitoring (ONE TOUCH ULTRA 2) meter device kit Use to test blood sugars 3 times daily or as directed. 1 kit 0 Taking     MEDICATION GIVEN BY INTRATHECAL PUMP - INSTRUCTION continuous January 4, 2018  - per [facility] in [place] [phone]:  Conc: Bupivacaine 20 mg/mL and Fentanyl 2000 mcg/mL, morphine 2 mg/mL  Continuous:  Fentanyl 795.9 mcg/day, Bupivacaine 7.759 mg/day, Morphine 0.7959 mg/day   Boluses: Up to 7 boluses per 24-hr period of Bupivicaine 0.599 mg and Fentanyl 59.9 mcg morphine 0.0599 mg    Pump Refill Date at Max Activations: 3/1/18   Taking     order for DME Equipment being ordered: Glucerna daily shakes with each meal 1 Box 11 Taking     ORDER FOR DME Equipment being ordered: Power Wheelchair 1 Device 0 Taking     ORDER FOR DME Equipment being ordered: Depends briefs 1 Month 11 Taking             Discharge Medications:     Current Discharge Medication List      CONTINUE these medications which have CHANGED    Details   acetaminophen-codeine (TYLENOL #3) 300-30 MG per tablet Take 1 tablet by mouth every 4 hours as needed for pain maximum 3 tablet(s) per day  Qty: 10 tablet, Refills: 0    Associated Diagnoses: Tension headache         CONTINUE these medications which have NOT CHANGED    Details   escitalopram (LEXAPRO) 10 MG tablet       dorzolamide-timolol (COSOPT) 2-0.5 % ophthalmic solution       !! LYRICA 150 MG capsule       SPIRIVA HANDIHALER 18 MCG capsule       !! traZODone (DESYREL) 50 MG tablet       lactulose (CHRONULAC) 10 GM/15ML solution Take 20 g by mouth as needed for constipation      Prochlorperazine Maleate  (COMPAZINE PO) Take 10 mg by mouth every 8 hours as needed for nausea      ESTRACE VAGINAL 0.1 MG/GM cream USE DIME SIZE AMOUNT 3X A WEEK AT NIGHT  Qty: 42.5 g, Refills: 11    Associated Diagnoses: Atrophic vaginitis      metFORMIN (GLUCOPHAGE-XR) 500 MG 24 hr tablet Take 1 tablet (500 mg) by mouth daily (with dinner)  Qty: 90 tablet, Refills: 3    Associated Diagnoses: Type 2 diabetes mellitus with diabetic peripheral angiopathy and gangrene, without long-term current use of insulin (H)      brimonidine (ALPHAGAN) 0.2 % ophthalmic solution Place 1 drop into the right eye 3 times daily  Qty: 15 mL, Refills: 11    Associated Diagnoses: Primary open angle glaucoma of both eyes, severe stage      sucralfate (CARAFATE) 1 GM tablet Take 1 tablet (1 g) by mouth 4 times daily May dissolve in 10 mL water is necessary. (Start upon completion of carafate suspension)  Qty: 120 tablet, Refills: 11    Associated Diagnoses: Adjustment disorder with depressed mood      albuterol (PROAIR HFA/PROVENTIL HFA/VENTOLIN HFA) 108 (90 BASE) MCG/ACT Inhaler Inhale 2 puffs into the lungs every 6 hours as needed for shortness of breath / dyspnea or wheezing  Qty: 3 Inhaler, Refills: 1    Associated Diagnoses: JOSE (obstructive sleep apnea)      umeclidinium (INCRUSE ELLIPTA) 62.5 MCG/INH oral inhaler Inhale 1 puff into the lungs daily      ONDANSETRON PO Take 4 mg by mouth 3 times daily      lidocaine (LIDODERM) 5 % Patch APPLY 1 TO 3 PATCHES TO PAINFUL AREA AT ONCE FOR UP TO 12 HOURS WITHIN A 24 HOUR PERIOD REMOVE AFTER 12 HOURS  Qty: 30 patch, Refills: 5    Associated Diagnoses: Chronic pain syndrome; 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 (H)      metoprolol (TOPROL-XL) 25 MG 24 hr tablet TAKE 1 TABLET (25 MG) BY MOUTH DAILY  Qty: 30 tablet, Refills: 10    Associated Diagnoses: Old myocardial infarction      polyethylene glycol (MIRALAX/GLYCOLAX) Packet Take 17 g by mouth daily Dissolved in water or juice        ACETAMINOPHEN PO Take 1,000 mg by mouth every 4 hours as needed for fever Not to exceed 4000 mg/day      !! pregabalin (LYRICA) 75 MG capsule Take 2 capsules (150 mg) by mouth 2 times daily  Qty: 120 capsule, Refills: 5    Associated Diagnoses: Pain in both upper extremities      cetirizine (ZYRTEC) 10 MG tablet Take 1 tablet (10 mg) by mouth daily as needed for allergies  Qty: 90 tablet, Refills: 3    Associated Diagnoses: Seasonal allergic rhinitis, unspecified allergic rhinitis trigger      diclofenac (VOLTAREN) 1 % GEL topical gel Apply 2 g topically 4 times daily to hands  Qty: 100 g, Refills: 11    Associated Diagnoses: Primary osteoarthritis of both hands      EPINEPHrine (EPIPEN JR) 0.15 MG/0.3ML injection Inject 0.3 mLs (0.15 mg) into the muscle as needed for anaphylaxis  Qty: 0.6 mL, Refills: 1    Associated Diagnoses: Bee sting reaction, undetermined intent, subsequent encounter      lisinopril (PRINIVIL/ZESTRIL) 10 MG tablet Take 1 tablet (10 mg) by mouth daily  Qty: 90 tablet, Refills: 3    Associated Diagnoses: Essential hypertension with goal blood pressure less than 130/80      risperiDONE (RISPERDAL) 0.5 MG tablet Take 0.5 mg by mouth At Bedtime      ferrous sulfate (IRON) 325 (65 FE) MG tablet Take 1 tablet (325 mg) by mouth 2 times daily With meals  Qty: 60 tablet, Refills: 2      albuterol (2.5 MG/3ML) 0.083% neb solution INHALE 1 VIAL VIA NEBULIZER EVERY 6 HOURS AS NEEDED  Qty: 360 mL, Refills: 11    Associated Diagnoses: Chronic obstructive pulmonary disease, unspecified COPD type (H)      Nutritional Supplements (RENÉE) PACK Take 1 packet by mouth 2 times daily      fluticasone (FLONASE) 50 MCG/ACT nasal spray Spray 1 spray into both nostrils daily      ADVAIR DISKUS 250-50 MCG/DOSE diskus inhaler Inhale 1 puff into the lungs 2 times daily       calcium citrate-vitamin D (CITRACAL) 315-250 MG-UNIT TABS Take 2 tablets by mouth daily  Qty: 120 tablet, Refills: 5    Associated Diagnoses:  Osteoporosis      hydrochlorothiazide (MICROZIDE) 12.5 MG capsule Take 1 capsule (12.5 mg) by mouth daily  Qty: 90 capsule, Refills: 3    Associated Diagnoses: Essential hypertension with goal blood pressure less than 130/80      estradiol (ESTRACE) 1 MG tablet Take 1 tablet (1 mg) by mouth daily  Qty: 90 tablet, Refills: 3    Associated Diagnoses: Person who has had sex change operation      levETIRAcetam (KEPPRA) 500 MG tablet Take 1 tablet (500 mg) by mouth 2 times daily  Qty: 180 tablet, Refills: 1    Associated Diagnoses: Nausea      aspirin EC 81 MG EC tablet Take 1 tablet (81 mg) by mouth daily  Qty: 90 tablet, Refills: 3    Associated Diagnoses: Unstable angina (H)      phenytoin 200 MG CAPS Take 200 mg by mouth 2 times daily  Qty: 60 capsule, Refills: 0    Associated Diagnoses: Seizure disorder (H)      dorzolamide (TRUSOPT) 2 % ophthalmic solution Place 1 drop into the right eye 2 times daily       zinc 50 MG TABS Take 1 tablet by mouth daily      nitroglycerin (NITROSTAT) 0.4 MG SL tablet Place 1 tablet (0.4 mg) under the tongue every 5 minutes as needed for chest pain if you are still having symptoms after 3 doses (15 minutes) call 911.  Qty: 25 tablet, Refills: 1    Associated Diagnoses: Chronic systolic congestive heart failure (H); Old myocardial infarction      latanoprost (XALATAN) 0.005 % ophthalmic solution Place 1 drop into both eyes At Bedtime  Qty: 2.5 mL, Refills: 11    Comments: Plz remind pt to f/u as scheduled, 3/25/16. Thanks!  Associated Diagnoses: Primary open angle glaucoma, stage unspecified      atorvastatin (LIPITOR) 40 MG tablet Take 1 tablet (40 mg) by mouth daily  Qty: 90 tablet, Refills: 3    Associated Diagnoses: Hyperlipidemia LDL goal <100      Cholecalciferol (VITAMIN D) 2000 UNITS tablet Take 2,000 Units by mouth daily.  Qty: 100 tablet, Refills: 3      Multiple Vitamin (MULTIVITAMIN OR) Take 1 tablet by mouth daily       pantoprazole (PROTONIX) 40 MG EC tablet Take 1 tablet  (40 mg) by mouth 2 times daily  Qty: 30 tablet, Refills: 1    Associated Diagnoses: Gastroesophageal reflux disease without esophagitis      blood glucose monitoring (ONE TOUCH ULTRASOFT) lancets Use to test blood sugar 3 times daily or as directed.  Qty: 100 each, Refills: PRN    Associated Diagnoses: Type 2 diabetes mellitus with diabetic peripheral angiopathy without gangrene, without long-term current use of insulin (H)      blood glucose monitoring (ONE TOUCH ULTRA) test strip Use to test blood sugars 3 times daily or as directed.  Qty: 3 Box, Refills: 3    Associated Diagnoses: Type 2 diabetes mellitus with diabetic peripheral angiopathy without gangrene, without long-term current use of insulin (H)      !! traZODone (DESYREL) 100 MG tablet Take 1.5 tablets (150 mg) by mouth At Bedtime  Qty: 90 tablet, Refills: 3    Associated Diagnoses: Anxiety state      !! order for DME Full electric hospital bed with half rails    Dx: R00854, I110, J449  Length of need: lifetime  Qty: 1 Device, Refills: 0    Associated Diagnoses: Status post bilateral above knee amputation (H)      !! order for DME Wheel Chair Cushion: 18 x 18 inch Roho cushion  Qty: 1 Device, Refills: 0    Associated Diagnoses: Status post bilateral above knee amputation (H)      !! order for DME Hospital bed with side rails  Qty: 1 Device, Refills: 0    Associated Diagnoses: 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 (H)      !! order for DME Equipment being ordered: CPAP supplies mask, hose, filters, cushion    fax to [facility] at [phone]  Qty: 10 Device, Refills: prn    Associated Diagnoses: COPD (chronic obstructive pulmonary disease) (H)      !! order for DME Equipment being ordered: CPAP supplies mask, hose, filters, cushion fax to [facility] at [phone]  Disp: 10 Device Refills: prn   Class: Local Print Start: 2/10/2017  Qty: 1 Device, Refills: 0    Associated Diagnoses: Chronic obstructive pulmonary disease,  unspecified COPD type (H)      !! order for DME Equipment being ordered: Nebulizer and tubing supplies  Qty: 1 Units, Refills: 0    Associated Diagnoses: Simple chronic bronchitis (H)      CYANOCOBALAMIN PO Take 2,000 mcg by mouth daily      blood glucose monitoring (ONE TOUCH ULTRA 2) meter device kit Use to test blood sugars 3 times daily or as directed.  Qty: 1 kit, Refills: 0    Associated Diagnoses: Type 2 diabetes, HbA1C goal < 8% (H)      MEDICATION GIVEN BY INTRATHECAL PUMP - INSTRUCTION continuous January 4, 2018  - per [facility] in [place] [phone]:  Conc: Bupivacaine 20 mg/mL and Fentanyl 2000 mcg/mL, morphine 2 mg/mL  Continuous:  Fentanyl 795.9 mcg/day, Bupivacaine 7.759 mg/day, Morphine 0.7959 mg/day   Boluses: Up to 7 boluses per 24-hr period of Bupivicaine 0.599 mg and Fentanyl 59.9 mcg morphine 0.0599 mg    Pump Refill Date at Max Activations: 3/1/18      !! order for DME Equipment being ordered: Glucerna daily shakes with each meal  Qty: 1 Box, Refills: 11    Associated Diagnoses: Type 2 diabetes mellitus with other diabetic neurological complication      !! ORDER FOR DME Equipment being ordered: Power Wheelchair  Qty: 1 Device, Refills: 0    Associated Diagnoses: CVA (cerebral infarction); HTN (hypertension)      !! ORDER FOR DME Equipment being ordered: Depends briefs  Qty: 1 Month, Refills: 11    Associated Diagnoses: Incontinence       !! - Potential duplicate medications found. Please discuss with provider.                Consultations:   Consultation during this admission received: None           Brief History of Illness:   Reason for admission requiring a surgical or invasive procedure:   Functional Incontinence    The patient underwent the following procedure(s):   See above   There were no immediate complications during this procedure.    Please refer to the full operative summary for details.           Hospital Course:   The patient's hospital course was  unremarkable.  Sonya Foote recovered as anticipated and experienced no post-operative complications.      On POD #1 her activity was at baseline, she was tolerating the discharge diet, had pain controlled with PO medications to go home with, and was requiring no IV medications or fluids. Her urine was clear yellow draining through her new SP tube. She was discharged to her assisted living facility with appropriate contact information, follow-up and instructions as seen below in the discharge paperwork.         Discharge Instructions and Follow-Up:   Diet:  - Resume home diet    Activity:   - No strenuous exercise for 2 weeks.  - No lifting, pushing, pulling more than 10 pounds for 2 weeks.   - Do not strain with bowel movements.  - Do not drive until you can press the brake pedal quickly and fully without pain.   - Do not operate a motor vehicle while taking narcotic pain medications.    Incisions  - You may shower and get incisions wet starting 48 hrs after surgery.  - Do not scrub incisions or submerge wounds for 2 weeks or until seen in follow-up.   - Remove wound dressing 48 hours after surgery.    Medications:   1. PAIN:   - Use over the counter acetaminophen (tylenol) or ibuprofen (motrin) for pain, follow the instructions on the bottle.   - do not take more than 4,000mg of Tylenol (acetaminophen) from all sources in any 24 hour period.  - Do not take more than 3200 mg of ibuprofen (motrin) from all sources in any 24 hour period.  - tylenol- codeine 10 pills are prescribed for your breakthrough postoperative pain. Use this as second line if acetaminophen or ibuprofen are ineffective.   - Transition from narcotic pain medications to tylenol (acetaminophen) as you are able.  Wean yourself off all pain medications as you are able.  - Narcotics can make you constipated.  Take over the counter fiber (metamucil or benefiber) and stool softeners (miralax, docusate or senna) while taking narcotic pain medications, but  stop if you develop diarrhea.  - No driving or operating machinery while taking narcotic pain medications    Tubes / drains:  You are going home with the following tubes or drains:    Suprapubic catheter.  - You are going home with a new suprapubic catheter.   - Protect the catheter and treat it like an extension of your body   - keep it secured to your abdomen with a cath secure and do not let it get caught, snagged, or tugged. The catheter tubing should remain tension-free and without kinks. In order to drain best, the tubing and bag should always be lower than your body.  - You may notice a little blood, small amount of white discharge, or caking at the catheter insertion site. This is normal but it can irritate the skin so it is best to wash this off in the daily shower. You are encouraged to wash the catheter tubing to keep it clean, and the urine drainage bag also can be gotten wet.     - Do not remove the suprapubic tube. If it comes out, you must call IMMEDIATELY to have it replaced (tract will close off within 1-2 hours).   - Follow-up in 4-6 weeks for catheter exchange in urology clinic.    Follow-Up:   - Call your primary care provider to touch base regarding your recent admission.   - Follow up with Dr. Dawson's team in 4-6 weeks to have your first suprapubic tube change in Urology Clinic.   - Call or return sooner than your regularly scheduled visit if you develop any of the following: fever (greater than 101.5), uncontrolled pain, uncontrolled nausea or vomiting, as well as increased redness, swelling, or drainage from your wound.      Phone numbers:   - Monday through Friday 8am to 4:30pm:  Call [phone] with routine questions or to schedule or confirm appointment.           Discharge Disposition:   Discharged to home (assisted living)      Attestation: I have reviewed today's vital signs, notes, medications, labs and imaging.    Ange Baez PA-C  Urology Physician Assistant  Personal Pager:  [phone]    Please call Job Code:   [phone] to reach the Urology resident or PA on call - Weekdays  [phone] to reach the Urology resident or PA on call - Weeknights and weekends    January 17, 2018

## 2018-01-17 NOTE — PROGRESS NOTES
Pt oriented x 4, sedated, rouses to gentle touch and voice. Pt denies dizziness, SOB, & n/v. Pt reports headache and back pain. Repositioned, applied hot pack, and administered evening medications. Pt respirations at 7, did not give any opioid pain medications. Explained to pt that she is too sedated and  could not give the oxycodone at this time. Pt stated that she understood.     Pt states that she usually has a mechanical diet and sometimes has trouble swallowing. Takes her oral meds with apple sauce. But is still able to drink water. Pt resting comfortably in bed. Will continue to monitor pt.    Pt electric wheelchair is still down in PACU. Spoke with nurse and she will have have the house orderly bring it up.

## 2018-01-17 NOTE — PROGRESS NOTES
[facility]  Patient is currently open to home care services with [facility].  The patient is currently receiving RN/OT services.  [facility]  and team have been notified that patient is under OUTPATIENT STATUS. [facility] liaison will continue to follow patient during stay.  If patient is admitted to inpatient status please provide orders to resume home care at time of discharge if appropriate.    Thank you  Keshia Schwab RN, BSN  [facility] Liaison  [phone]

## 2018-01-17 NOTE — PROGRESS NOTES
Urology Daily Progress Note  01/17/2018    24 hour events/Subjective:     - No acute events overnight   - No\ complaints on AM rounds   - complaining of back pain, chronic    - no chest pain or SOB  O:  Temp:  [97.2  F (36.2  C)-98.4  F (36.9  C)] 98.4  F (36.9  C)  Pulse:  [59] 59  Heart Rate:  [58-62] 59  Resp:  [8-20] 10  BP: (120-181)/(62-97) 130/66  SpO2:  [96 %-100 %] 99 %  General: Alert, interactive, & in NAD  Resp: Breathing is non-labored on RA  Cardiac: Regular rate; extremities warm;   Abdomen: Soft, nontender, nondistended. SPT in place draining clear urine .  Extremities: No LE edema or obvious joint abnormalities  Skin: Warm and dry, no jaundice or rash     Ins & Outs (24 hours/since MN)  UOP  1000/-    Labs/Imaging  BMP  Recent Labs  Lab 01/16/18  1252   POTASSIUM 3.7   CR 0.48*     CBCNo lab results found in last 7 days.  INRNo lab results found in last 7 days.     Assessment/Plan  67 year old female POD #1 s/p SP tube placement. Doing well.    PLAN:  Neuro/Pain: Continue current regimen  CV/PULM: No acute issues. Encourage IS, ambulation  GI/FEN: regular diet  : UOP appropriate  Heme: No active issue  Activity: Up ad ronnie  Ppx: SCDs, ambulation, IS,   Dispo: home today     Seen and examined with the chief resident. Will discuss with Dr. Dawson.    --    Stephen Cano MD  Urology PGY3     Contacting the Urology Team     Please use the following job codes to reach the Urology Team. Note that you must use an in house phone and that job codes cannot receive text pages.     On weekdays, dial [phone] (or star-star-star [phone] on the new [employer] telephones) then [phone] to reach the Adult Urology resident or PA on call    On weekdays, dial [phone] (or star-star-star [phone] on the new [employer] telephones) then [phone] to reach the Pediatric Urology resident    On weeknights and weekends, dial [phone] (or star-star-star [phone] on the new [employer] telephones) then [phone] to reach the Urology resident on call (for both Adult and Pediatrics)

## 2018-01-17 NOTE — PROGRESS NOTES
1/17/17: Member kept on observation overnight.        CM received call from member that she was home from hospital - she states all went well but is very tired.  She states that the  ripped her harrell bag  - staff at [facility] attached a temporary one.  Member states she is unsure what to do next - staff is not assisting her. Explained that need to call home care or the urology department.   She asked staff assistance while CM on phone with member and they declined to assist her.  CM placed call to [facility] - spoke with intake and then tx to Abby - explained situation to Abby - she states that EDWARDO Lemus is in meeting but will let him know and he will see member today.  CM placed call back to member - relayed info to her.   CM attempted to call nursing office at [facility] to discuss - left message.   CM also left vm for RN Director, Julia at [facility] regarding staff reluctantly to assist member.       Member called CM and stated that she received call from [facility] and Allan is coming to see her soon.     Jamie Sharma, RN, PHN  [employer]

## 2018-01-18 NOTE — PROGRESS NOTES
1/18/17: Cm f/u with member - she states EDWARDO Lemus with [facility] came yesterday and today - everything is going well with catheter - she is very thankful.       Jamie Sharma RN, N  [facility]

## 2018-01-18 NOTE — PROGRESS NOTES
Called pt and went over the medication.  Importance of taking until gone patient will call if any further questions.        Pt is returning your call from this morning, regarding her medication. Please f/u whenever you're available.     Thank you!     [place]

## 2018-01-19 ENCOUNTER — CARE COORDINATION (OUTPATIENT)
Dept: GERIATRIC MEDICINE | Facility: CLINIC | Age: 68
End: 2018-01-19

## 2018-01-19 DIAGNOSIS — Z76.89 HEALTH CARE HOME: Chronic | ICD-10-CM

## 2018-01-19 NOTE — PROGRESS NOTES
1/19/18: Member and Cm had discussed early this week about possible move to another AL in near future - member is hesitant - she is not sure she wants to move again but also not sure she wants to stay at [facility].   CM had received call from [facility] with opening for female - member can call Maria De Jesus at [phone] to set up tour.   CM placed call to member to relay info - [facility] worker, Prashanth, was there - Prashanth took down information and states that she will assist member is scheduling a tour. She will also look into moving assistance if needed.      Jamie Sharma, RN, PHN  [employer]

## 2018-01-19 NOTE — PROGRESS NOTES
1/19/18: CM received call from ULISES Shine with [facility] - assisted member with new roho cushion and education on care.   Member is in need of talking wall/desk clock d/t legally blind/low vision.  Member is also in need of rear view mirror for electric chair as due to poor vision as well makes difficult to see when backing up.  Requesting wrist rear view mirrors as has many other attachments on chair.  CM placed call to [employer] - spoke with Sonali.  They do not have in stock  - CM sent links to Sonali of equip.  She will fwd to  and notify CM of quote.   Fátima would like to be notified when delivered as she will see member again at that time.     Jamie Sharma RN, PHN  [employer]

## 2018-01-24 NOTE — PROGRESS NOTES
1/24/18: CM received quote for EW items - voice activated clock and rear view mirror for electric chair.   CPS and POC  updated and sent to CM to enter auth.     Jamie Sharma RN, N  [facility]

## 2018-01-26 ENCOUNTER — DOCUMENTATION ONLY (OUTPATIENT)
Dept: CARE COORDINATION | Facility: CLINIC | Age: 68
End: 2018-01-26

## 2018-01-26 NOTE — PROGRESS NOTES
[facility] now requests orders and shares plan of care/discharge summaries for some patients through [employer].  Please REPLY TO THIS MESSAGE in order to give authorization for orders when needed.  This is considered a verbal order, you will still receive a faxed copy of orders for signature.  Thank you for your assistance in improving collaboration for our patients.    ORDER    Skilled nursing   1 x week x 4,, 3 PRN    MD SUMMARY/PLAN OF CARE    Pt had suprapubic catheter placed 01/16/18 and now requires post op cares including education on suprapubic site cares and     catheter mgmt. [facility] will manage all changes moving forward following first change performed by surgeon.

## 2018-01-26 NOTE — PROGRESS NOTES
1/26/18: ELVIRA left another  for DON at [facility] as no call back.    Received call back from Julia.  She will educate staff again on roles and communication.  She will also talk to member and give member her cell so that if any issues with staff member can contact Julia directly.     Relayed this to Member - she had already spoken with Julia.   She mentioned that she is not ready to move to another AL at this time but may decide to get on wait list.     Jamie Sharma RN, PHN  [employer]

## 2018-01-29 ENCOUNTER — PRE VISIT (OUTPATIENT)
Dept: UROLOGY | Facility: CLINIC | Age: 68
End: 2018-01-29

## 2018-01-29 NOTE — TELEPHONE ENCOUNTER
Post op/SP tube change.  S/P Cystoscopy, Intraoperative Ultrasound, Suprapubic Tube Placement on 1-16-18.  Records available in [facility].

## 2018-01-31 ENCOUNTER — OFFICE VISIT (OUTPATIENT)
Dept: INTERNAL MEDICINE | Facility: CLINIC | Age: 68
End: 2018-01-31
Payer: COMMERCIAL

## 2018-01-31 VITALS
OXYGEN SATURATION: 93 % | WEIGHT: 123 LBS | TEMPERATURE: 98.2 F | BODY MASS INDEX: 19.26 KG/M2 | SYSTOLIC BLOOD PRESSURE: 100 MMHG | HEART RATE: 80 BPM | DIASTOLIC BLOOD PRESSURE: 54 MMHG

## 2018-01-31 DIAGNOSIS — R39.81 FUNCTIONAL INCONTINENCE: ICD-10-CM

## 2018-01-31 DIAGNOSIS — M79.601 PAIN IN BOTH UPPER EXTREMITIES: ICD-10-CM

## 2018-01-31 DIAGNOSIS — G44.209 TENSION HEADACHE: ICD-10-CM

## 2018-01-31 DIAGNOSIS — M79.602 PAIN IN BOTH UPPER EXTREMITIES: ICD-10-CM

## 2018-01-31 DIAGNOSIS — I50.22 CHRONIC SYSTOLIC CONGESTIVE HEART FAILURE (H): ICD-10-CM

## 2018-01-31 DIAGNOSIS — I10 ESSENTIAL HYPERTENSION: ICD-10-CM

## 2018-01-31 DIAGNOSIS — Z09 HOSPITAL DISCHARGE FOLLOW-UP: Primary | ICD-10-CM

## 2018-01-31 PROCEDURE — 99214 OFFICE O/P EST MOD 30 MIN: CPT | Performed by: INTERNAL MEDICINE

## 2018-01-31 NOTE — PROGRESS NOTES
SUBJECTIVE:   Sonya Foote is a 68 year old female who presents to clinic today for the following health issues:    Hospital Follow-up Visit:    Hospital/Nursing Home/IP Rehab Facility: [facility]  Date of Admission: 1/16/18  Date of Discharge: 1/17/18  Reason(s) for Admission: Tension Headache             Problems taking medications regularly:  None       Medication changes since discharge: None       Problems adhering to non-medication therapy:  None    Summary of hospitalization:  [facility] discharge summary reviewed  Diagnostic Tests/Treatments reviewed.  Follow up needed: as ordered  Other Healthcare Providers Involved in Patient s Care:         Homecare  Update since discharge: stable.     Post Discharge Medication Reconciliation: discharge medications reconciled, continue medications without change.  Plan of care communicated with patient     Coding guidelines for this visit:  Type of Medical   Decision Making Face-to-Face Visit       within 7 Days of discharge Face-to-Face Visit        within 14 days of discharge   Moderate Complexity [id number]   High Complexity [id number]               Brief History of Illness:   Reason for admission requiring a surgical or invasive procedure:    Functional Incontinence    The patient underwent the following procedure(s):    See above   There were no immediate complications during this procedure.    Please refer to the full operative summary for details.           Hospital Course:   The patient's hospital course was unremarkable.  Sonya Foote recovered as anticipated and experienced no post-operative complications.       On POD #1 her activity was at baseline, she was tolerating the discharge diet, had pain controlled with PO medications to go home with, and was requiring no IV medications or fluids. Her urine was clear yellow draining through her new SP tube. She was discharged to her assisted living facility with appropriate contact  information, follow-up and instructions as seen below in the discharge paperwork.    Other concerns:  1. Episode of left side chest pain this morning. Was given 2 Nitroglycerin by nursing staff and pain resolved.  She states that she frequently gets this and that this is of no concern to her at the present time.  It is unclear also if her symptoms were actually anginal in nature.  Discussion with the patient she says that she developed a headache first and subsequently then got some atypical chest discomfort that was not associated with significant dyspnea on exertion or orthopnea although it is difficult to say because this occurred while she is in a chair.  She states she has had this before and has used the nitroglycerin many times in the past and was advised in the past that if she has to use it on a more frequent and consistent basis or more than 3 times that she should be seen in the emergency room.  This was discussed with the patient.    Problem list and histories reviewed & adjusted, as indicated.  Additional history: as documented    Patient Active Problem List   Diagnosis     Hyperlipidemia LDL goal <100     Seizure disorder (H)     ACP (advance care planning)     Osteoporosis     Schizoaffective disorder (H)     AS (sickle cell trait) (H)     Vertigo     Person who has had sex change operation     Claudication in peripheral vascular disease (H)     Intestinal malabsorption     GIB (gastrointestinal bleeding)     Cervicalgia     Health Care Home     Asthma     Adjustment disorder with depressed mood     Chronic pain syndrome     Open-angle glaucoma     Hx of colonic polyp     Old myocardial infarction     Iron deficiency anemia     Late effect of stroke     Degeneration of intervertebral disc of lumbosacral region     Thoracic or lumbosacral neuritis or radiculitis     Cerebral infarction due to occlusion or stenosis of carotid artery     Disorder of bone and cartilage     Hereditary and idiopathic  peripheral neuropathy     Androgen insensitivity syndrome     PAD (peripheral artery disease) (H)     Chronic systolic congestive heart failure (H)     Stenosis of carotid artery     Osteoarthritis     Pain in both upper extremities     Atherosclerotic peripheral vascular disease with rest pain (H)     Essential hypertension     Cellulitis of right ankle     Angina pectoris, crescendo (H)     Type 2 diabetes mellitus with diabetic peripheral angiopathy without gangrene, without long-term current use of insulin (H)     Anemia, unspecified type     Critical lower limb ischemia     Testicular feminization     Anxiety disorder due to general medical condition     Central retinal artery occlusion     Lumbosacral radiculitis     Peripheral neuropathy     Osteopenia     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 (H)     Primary open angle glaucoma of both eyes, severe stage     Pseudophakia of right eye     Cataract, left eye     Diabetes mellitus type 2 without retinopathy (H)     Pyelonephritis     Chronic obstructive pulmonary disease, unspecified COPD type (H)     JOSE (obstructive sleep apnea)     Complex sleep apnea syndrome     Coronary artery disease of native artery of native heart with stable angina pectoris (H)     Hyperlipidemia     Ischemic cardiomyopathy     Bee sting reaction, undetermined intent, subsequent encounter     Functional incontinence     Personal history of urinary tract infection     Dysphagia     Single seizure (H)     Essential hypertension with goal blood pressure less than 130/80     Hyperlipidemia LDL goal <70     UTI (urinary tract infection)     Food impaction of esophagus     Esophageal foreign body     Nausea & vomiting     Incontinence     Past Surgical History:   Procedure Laterality Date     AMPUTATE LEG ABOVE KNEE Left 6/11/2016    Procedure: AMPUTATE LEG ABOVE KNEE;  Surgeon: Mello Rodriguez MD;  [facility]     AMPUTATE LEG BELOW KNEE Right 11/7/2016     Procedure: AMPUTATE LEG BELOW KNEE;  Surgeon: Savannah Durant MD;  [facility]     AMPUTATE REVISION STUMP LOWER EXTREMITY Right 11/11/2016    Procedure: AMPUTATE REVISION STUMP LOWER EXTREMITY;  Surgeon: Savannah Durant MD;  [facility]     AMPUTATE REVISION STUMP LOWER EXTREMITY Right 11/16/2016    Procedure: AMPUTATE REVISION STUMP LOWER EXTREMITY;  Surgeon: Savannah Durant MD;  [facility]     AMPUTATE TOE(S) Right 1/5/2016    Procedure: AMPUTATE TOE(S);  Surgeon: Mello Gaines DPM;  Location: [facility]     ANGIOGRAM Bilateral 11/21/2014    Procedure: ANGIOGRAM;  Surgeon: Savannah Durant MD;  [facility]     ANGIOGRAM Left 1/16/2015    Procedure: ANGIOGRAM;  Surgeon: Savannah Durant MD;  [facility]     ANGIOGRAM Bilateral 9/14/2015    Procedure: ANGIOGRAM;  Surgeon: Savannah Durant MD;  [facility]     ANGIOGRAM Left 10/12/2015    Procedure: ANGIOGRAM;  Surgeon: Savannah Durant MD;  [facility]     ANGIOGRAM Right 6/6/2016    Procedure: ANGIOGRAM;  Surgeon: Savannah Durant MD;  [facility]     ANGIOPLASTY Right 6/6/2016    Procedure: ANGIOPLASTY;  Surgeon: Savannah Durant MD;  [facility]     APPENDECTOMY       BREAST SURGERY      right breast bx (benign)     CHOLECYSTECTOMY       COLONOSCOPY N/A 8/25/2014    Procedure: COLONOSCOPY;  Surgeon: Mello Ferrer MD;  [facility]     COLONOSCOPY WITH CO2 INSUFFLATION N/A 8/20/2014    Procedure: COLONOSCOPY WITH CO2 INSUFFLATION;  Surgeon: Duane, William Charles, MD;  Location: [facility]     CYSTOSTOMY, INSERT TUBE SUPRAPUBIC, COMBINED N/A 1/16/2018    Procedure: COMBINED CYSTOSTOMY, INSERT TUBE SUPRAPUBIC;  Cystoscopy, Intraoperative Ultrasound, Suprapubic Tube Placement;  Surgeon: Keanu Dawson MD;  [facility]     ENDARTERECTOMY FEMORAL  5/23/2014    Procedure: ENDARTERECTOMY FEMORAL;  Surgeon: Jason Joshi MD;  [facility]     ESOPHAGOSCOPY, GASTROSCOPY, DUODENOSCOPY (EGD), COMBINED  12/14/2012    Procedure:  COMBINED ESOPHAGOSCOPY, GASTROSCOPY, DUODENOSCOPY (EGD), BIOPSY SINGLE OR MULTIPLE;  ESOPHAGOSCOPY, GASTROSCOPY, DUODENOSCOPY (EGD), DILATATION ;  Surgeon: Elizabeth Stevenson MD;  Location:  GI     ESOPHAGOSCOPY, GASTROSCOPY, DUODENOSCOPY (EGD), COMBINED  12/31/2013    Procedure: COMBINED ESOPHAGOSCOPY, GASTROSCOPY, DUODENOSCOPY (EGD), BIOPSY SINGLE OR MULTIPLE;;  Surgeon: Clemente Lopez MD;  Location:  GI     ESOPHAGOSCOPY, GASTROSCOPY, DUODENOSCOPY (EGD), COMBINED  4/1/2014    Procedure: COMBINED ESOPHAGOSCOPY, GASTROSCOPY, DUODENOSCOPY (EGD);;  Surgeon: Clemente Lopez MD;  Location:  GI     ESOPHAGOSCOPY, GASTROSCOPY, DUODENOSCOPY (EGD), COMBINED  6/28/2014    Procedure: COMBINED ESOPHAGOSCOPY, GASTROSCOPY, DUODENOSCOPY (EGD);  Surgeon: Clemente Lopez MD;  Location:  GI     ESOPHAGOSCOPY, GASTROSCOPY, DUODENOSCOPY (EGD), COMBINED N/A 8/20/2014    Procedure: COMBINED ESOPHAGOSCOPY, GASTROSCOPY, DUODENOSCOPY (EGD), BIOPSY SINGLE OR MULTIPLE;  Surgeon: Duane, William Charles, MD;  Location: [facility]     ESOPHAGOSCOPY, GASTROSCOPY, DUODENOSCOPY (EGD), COMBINED N/A 8/22/2014    Procedure: COMBINED ESOPHAGOSCOPY, GASTROSCOPY, DUODENOSCOPY (EGD), BIOPSY SINGLE OR MULTIPLE;  Surgeon: Mello Ferrer MD;  Location:  GI     ESOPHAGOSCOPY, GASTROSCOPY, DUODENOSCOPY (EGD), COMBINED N/A 10/2/2014    Procedure: COMBINED ESOPHAGOSCOPY, GASTROSCOPY, DUODENOSCOPY (EGD), BIOPSY SINGLE OR MULTIPLE;  Surgeon: Remy Haskins MD;  Location:  GI     ESOPHAGOSCOPY, GASTROSCOPY, DUODENOSCOPY (EGD), COMBINED Left 12/15/2014    Procedure: COMBINED ESOPHAGOSCOPY, GASTROSCOPY, DUODENOSCOPY (EGD), BIOPSY SINGLE OR MULTIPLE;  Surgeon: Remy Haskins MD;  Location:  [facility]     ESOPHAGOSCOPY, GASTROSCOPY, DUODENOSCOPY (EGD), COMBINED N/A 2/25/2015    Procedure: COMBINED ENDOSCOPIC ULTRASOUND, ESOPHAGOSCOPY, GASTROSCOPY, DUODENOSCOPY (EGD), FINE NEEDLE ASPIRATE/BIOPSY;  Surgeon: Clemente Lugo MD;  Location:  GI      ESOPHAGOSCOPY, GASTROSCOPY, DUODENOSCOPY (EGD), COMBINED Left 2/25/2015    Procedure: COMBINED ESOPHAGOSCOPY, GASTROSCOPY, DUODENOSCOPY (EGD), BIOPSY SINGLE OR MULTIPLE;  Surgeon: Clemente Lugo MD;  [facility]     ESOPHAGOSCOPY, GASTROSCOPY, DUODENOSCOPY (EGD), COMBINED N/A 9/25/2016    Procedure: COMBINED ESOPHAGOSCOPY, GASTROSCOPY, DUODENOSCOPY (EGD);  Surgeon: Aziza Patiño MD;  [facility]     ESOPHAGOSCOPY, GASTROSCOPY, DUODENOSCOPY (EGD), COMBINED N/A 1/18/2017    Procedure: COMBINED ESOPHAGOSCOPY, GASTROSCOPY, DUODENOSCOPY (EGD), BIOPSY SINGLE OR MULTIPLE;  Surgeon: Clemente Lopez MD;  [facility]     ESOPHAGOSCOPY, GASTROSCOPY, DUODENOSCOPY (EGD), COMBINED N/A 11/26/2017    Procedure: COMBINED ESOPHAGOSCOPY, GASTROSCOPY, DUODENOSCOPY (EGD), REMOVE FOREIGN BODY;  Esophagogastroduodenoscopy with foreign body extraction  ;  Surgeon: Herberth Castrejon MD;  [facility]     ESOPHAGOSCOPY, GASTROSCOPY, DUODENOSCOPY (EGD), COMBINED N/A 11/26/2017    Procedure: COMBINED ESOPHAGOSCOPY, GASTROSCOPY, DUODENOSCOPY (EGD), REMOVE FOREIGN BODY;;  Surgeon: Herberth Castrejon MD;  [facility]     FASCIOTOMY LOWER EXTREMITY Left 6/10/2016    Procedure: FASCIOTOMY LOWER EXTREMITY;  Surgeon: Mello Rodriguez MD;  [facility]     HC CAPSULE ENDOSCOPY N/A 8/25/2014    Procedure: CAPSULE/PILL CAM ENDOSCOPY;  Surgeon: Remy Haskins MD;  [facility]     HC CAPSULE ENDOSCOPY N/A 10/2/2014    Procedure: CAPSULE/PILL CAM ENDOSCOPY;  Surgeon: Remy Haskins MD;  [facility]     ORTHOPEDIC SURGERY      broken wrist repair     SEX TRANSFORMATION SURGERY, MALE TO FEMALE      1974     SINUS SURGERY      cyst removed     TONSILLECTOMY       VASCULAR SURGERY      Left carotid stent       Social History   Substance Use Topics     Smoking status: Former Smoker     Packs/day: 2.50     Years: 30.00     Types: Cigarettes, Cigars     Quit date: 11/1/2001     Smokeless tobacco:  Never Used     Alcohol use No     Family History   Problem Relation Age of Onset     Dementia Mother      Glaucoma Mother      DIABETES Mother      may have been type 1, childhood     Coronary Artery Disease Mother      MI     Glaucoma Father      DIABETES Father      may hev been type 1 - ??     Heart Failure Father      CANCER Maternal Aunt      leukemia     Schizophrenia Brother      Depression Brother      Suicide Sister      Depression Sister      DIABETES Sister      CANCER Maternal Aunt      ovarian     Glaucoma Maternal Grandmother      DIABETES Maternal Grandmother      Glaucoma Maternal Grandfather      DIABETES Maternal Grandfather      Glaucoma Paternal Grandmother      DIABETES Paternal Grandmother      Glaucoma Paternal Grandfather      DIABETES Paternal Grandfather      Breast Cancer Sister      CEREBROVASCULAR DISEASE Brother      Colon Cancer No family hx of          Current Outpatient Prescriptions   Medication Sig Dispense Refill     acetaminophen-codeine (TYLENOL #3) 300-30 MG per tablet Take 1 tablet by mouth every 4 hours as needed for pain maximum 3 tablet(s) per day 10 tablet 0     escitalopram (LEXAPRO) 10 MG tablet        dorzolamide-timolol (COSOPT) 2-0.5 % ophthalmic solution        LYRICA 150 MG capsule        SPIRIVA HANDIHALER 18 MCG capsule        traZODone (DESYREL) 50 MG tablet        lactulose (CHRONULAC) 10 GM/15ML solution Take 20 g by mouth as needed for constipation       Prochlorperazine Maleate (COMPAZINE PO) Take 10 mg by mouth every 8 hours as needed for nausea       ESTRACE VAGINAL 0.1 MG/GM cream USE DIME SIZE AMOUNT 3X A WEEK AT NIGHT 42.5 g 11     pantoprazole (PROTONIX) 40 MG EC tablet Take 1 tablet (40 mg) by mouth 2 times daily (Patient taking differently: Take 40 mg by mouth daily ) 30 tablet 1     metFORMIN (GLUCOPHAGE-XR) 500 MG 24 hr tablet Take 1 tablet (500 mg) by mouth daily (with dinner) 90 tablet 3     brimonidine (ALPHAGAN) 0.2 % ophthalmic solution Place 1  drop into the right eye 3 times daily (Patient taking differently: Place 1 drop into the right eye 2 times daily ) 15 mL 11     sucralfate (CARAFATE) 1 GM tablet Take 1 tablet (1 g) by mouth 4 times daily May dissolve in 10 mL water is necessary. (Start upon completion of carafate suspension) 120 tablet 11     albuterol (PROAIR HFA/PROVENTIL HFA/VENTOLIN HFA) 108 (90 BASE) MCG/ACT Inhaler Inhale 2 puffs into the lungs every 6 hours as needed for shortness of breath / dyspnea or wheezing 3 Inhaler 1     umeclidinium (INCRUSE ELLIPTA) 62.5 MCG/INH oral inhaler Inhale 1 puff into the lungs daily       ONDANSETRON PO Take 4 mg by mouth 3 times daily       lidocaine (LIDODERM) 5 % Patch APPLY 1 TO 3 PATCHES TO PAINFUL AREA AT ONCE FOR UP TO 12 HOURS WITHIN A 24 HOUR PERIOD REMOVE AFTER 12 HOURS 30 patch 5     blood glucose monitoring (ONE TOUCH ULTRASOFT) lancets Use to test blood sugar 3 times daily or as directed. 100 each PRN     blood glucose monitoring (ONE TOUCH ULTRA) test strip Use to test blood sugars 3 times daily or as directed. 3 Box 3     metoprolol (TOPROL-XL) 25 MG 24 hr tablet TAKE 1 TABLET (25 MG) BY MOUTH DAILY 30 tablet 10     traZODone (DESYREL) 100 MG tablet Take 1.5 tablets (150 mg) by mouth At Bedtime 90 tablet 3     [place] [facility] [place] with half rails    Dx: T90648, I110, J449  Length of need: lifetime 1 Device 0     [place] [facility] [place] Cushion: 18 x 18 inch Roho cushion 1 Device 0     [place] [facility] [place] with side rails 1 Device 0     polyethylene glycol (MIRALAX/GLYCOLAX) Packet Take 17 g by mouth daily Dissolved in water or juice        ACETAMINOPHEN PO Take 1,000 mg by mouth every 4 hours as needed for fever Not to exceed 4000 mg/day       pregabalin (LYRICA) 75 MG capsule Take 2 capsules (150 mg) by mouth 2 times daily 120 capsule 5     cetirizine (ZYRTEC) 10 MG tablet Take 1 tablet (10 mg) by mouth daily as needed for allergies 90 tablet 3     diclofenac  (VOLTAREN) 1 % GEL topical gel Apply 2 g topically 4 times daily to hands 100 g 11     EPINEPHrine (EPIPEN JR) 0.15 MG/0.3ML injection Inject 0.3 mLs (0.15 mg) into the muscle as needed for anaphylaxis 0.6 mL 1     lisinopril (PRINIVIL/ZESTRIL) 10 MG tablet Take 1 tablet (10 mg) by mouth daily 90 tablet 3     risperiDONE (RISPERDAL) 0.5 MG tablet Take 0.5 mg by mouth At Bedtime       ferrous sulfate (IRON) 325 (65 FE) MG tablet Take 1 tablet (325 mg) by mouth 2 times daily With meals 60 tablet 2     order for DME Equipment being ordered: CPAP supplies mask, hose, filters, cushion    fax to [facility] at [phone] 10 Device prn     order for DME Equipment being ordered: CPAP supplies mask, hose, filters, cushion fax to [facility] at [phone]  Disp: 10 Device Refills: prn   Class: Local Print Start: 2/10/2017 1 Device 0     order for DME Equipment being ordered: Nebulizer and tubing supplies 1 Units 0     albuterol (2.5 MG/3ML) 0.083% neb solution INHALE 1 VIAL VIA NEBULIZER EVERY 6 HOURS AS NEEDED 360 mL 11     CYANOCOBALAMIN PO Take 2,000 mcg by mouth daily       Nutritional Supplements (RENÉE) PACK Take 1 packet by mouth 2 times daily       fluticasone (FLONASE) 50 MCG/ACT nasal spray Spray 1 spray into both nostrils daily       ADVAIR DISKUS 250-50 MCG/DOSE diskus inhaler Inhale 1 puff into the lungs 2 times daily        calcium citrate-vitamin D (CITRACAL) 315-250 MG-UNIT TABS Take 2 tablets by mouth daily 120 tablet 5     hydrochlorothiazide (MICROZIDE) 12.5 MG capsule Take 1 capsule (12.5 mg) by mouth daily (Patient taking differently: Take 12.5 mg by mouth daily Hold for sbp<90) 90 capsule 3     estradiol (ESTRACE) 1 MG tablet Take 1 tablet (1 mg) by mouth daily 90 tablet 3     levETIRAcetam (KEPPRA) 500 MG tablet Take 1 tablet (500 mg) by mouth 2 times daily 180 tablet 1     aspirin EC 81 MG EC tablet Take 1 tablet (81 mg) by mouth daily 90 tablet 3     blood glucose monitoring (ONE TOUCH ULTRA  2) meter device kit Use to test blood sugars 3 times daily or as directed. 1 kit 0     phenytoin 200 MG CAPS Take 200 mg by mouth 2 times daily (Patient taking differently: Take 200 mg by mouth 2 times daily (takes at 8 AM and 8 PM)) 60 capsule 0     dorzolamide (TRUSOPT) 2 % ophthalmic solution Place 1 drop into the right eye 2 times daily        zinc 50 MG TABS Take 1 tablet by mouth daily       nitroglycerin (NITROSTAT) 0.4 MG SL tablet Place 1 tablet (0.4 mg) under the tongue every 5 minutes as needed for chest pain if you are still having symptoms after 3 doses (15 minutes) call 911. 25 tablet 1     MEDICATION GIVEN BY INTRATHECAL PUMP - INSTRUCTION continuous January 4, 2018  - per [facility] in [place] [phone]:  Conc: Bupivacaine 20 mg/mL and Fentanyl 2000 mcg/mL, morphine 2 mg/mL  Continuous:  Fentanyl 795.9 mcg/day, Bupivacaine 7.759 mg/day, Morphine 0.7959 mg/day   Boluses: Up to 7 boluses per 24-hr period of Bupivicaine 0.599 mg and Fentanyl 59.9 mcg morphine 0.0599 mg    Pump Refill Date at [facility] Activations: 3/1/18       latanoprost (XALATAN) 0.005 % ophthalmic solution Place 1 drop into both eyes At Bedtime 2.5 mL 11     atorvastatin (LIPITOR) 40 MG tablet Take 1 tablet (40 mg) by mouth daily (Patient taking differently: Take 40 mg by mouth At Bedtime ) 90 tablet 3     order for DME Equipment being ordered: Glucerna daily shakes with each meal 1 Box 11     ORDER FOR DME Equipment being ordered: Power Wheelchair 1 Device 0     ORDER FOR DME Equipment being ordered: Depends briefs 1 Month 11     Cholecalciferol (VITAMIN D) 2000 UNITS tablet Take 2,000 Units by mouth daily. 100 tablet 3     Multiple Vitamin (MULTIVITAMIN OR) Take 1 tablet by mouth daily        Allergies   Allergen Reactions     Bee Venom      Penicillins Anaphylaxis     Other reaction(s): Skin Rash and/or Hives     Dilantin [Phenytoin] Other (See Comments)     Generic dilantin only per pt     Iodide Hives      09/11/15: Pt states she can use iodine and doesn't have any problems      Iodine-131      Novocaine [Procaine] Hives     Other reaction(s): Skin Rash and/or Hives     Tositumomab      BP Readings from Last 3 Encounters:   01/31/18 100/54   01/17/18 130/66   01/10/18 130/71    Wt Readings from Last 3 Encounters:   01/31/18 123 lb (55.8 kg)   01/04/18 123 lb (55.8 kg)   12/29/17 137 lb (62.1 kg)                    Reviewed and updated as needed this visit by clinical staff  Tobacco  Allergies  Meds  Med Hx  Surg Hx  Fam Hx  Soc Hx      Reviewed and updated as needed this visit by Provider         ROS:  C: NEGATIVE for fever, chills, change in weight  E/M: NEGATIVE for ear, mouth and throat problems  R: NEGATIVE for significant cough or SOB  GI: NEGATIVE for nausea, abdominal pain, heartburn, or change in bowel habits  M: NEGATIVE for significant arthralgias or myalgia  H: NEGATIVE for bleeding problems  P: NEGATIVE for changes in mood or affect    OBJECTIVE:                                                    /54  Pulse 80  Temp 98.2  F (36.8  C) (Oral)  Wt 123 lb (55.8 kg)  SpO2 93%  BMI 19.26 kg/m2  Body mass index is 19.26 kg/(m^2).  GENERAL: alert and no distress in wheelchair  EYES: Eyes grossly normal to inspection, extraocular movements - intact, and PERRL  HENT: ear canals- normal; TMs- normal; Nose- normal; Mouth- no ulcers, no lesions  NECK: no tenderness, no adenopathy, no asymmetry, no masses, no stiffness; thyroid- normal to palpation  RESP: lungs clear to auscultation - no rales, no rhonchi, no wheezes  CV: regular rates and rhythm, normal S1 S2, no S3 or S4 and no click or rub -  MS: noted bilateral AKA's  PSYCH: Alert and oriented times 3; speech- coherent , normal rate and volume; able to articulate logical thoughts, able to abstract reason, no tangential thoughts, no hallucinations or delusions, affect- normal     ASSESSMENT/PLAN:                                                       (Z09) Hospital discharge follow-up  (primary encounter diagnosis)  Comment: Appears stable and doing well at the present time.  Plan:     (R39.81) Functional incontinence  Comment: Follow-up with urology as scheduled.  Plan:     (M79.601,  M79.602) Pain in both upper extremities  Comment: Pain is atypical in nature no focal changes and this is her baseline.  Plan:     (I50.22) Chronic systolic congestive heart failure (H)  Comment: She appears euvolemic overall.  Her lungs are otherwise clear and her vital signs remained stable per  Plan: Continue with routine medications as ordered.  We discussed her above chest symptoms in depth.  She is well aware of the symptoms and they have been chronic and stable in nature.  She will follow-up if they worsen.    (I10) Essential hypertension  Comment: Stable on therapy at goal with no change recommended per  Plan:     (G44.209) Tension headache  Comment: Recurrent and chronic in nature.  Plan: acetaminophen-codeine (TYLENOL #3) 300-30 MG         per tablet        Given her a few Tylenol with codeine to use mainly for her postoperative pain and have informed her that ongoing refills of this will be limited per      See Patient Instructions    Allan Casey MD  [facility]

## 2018-01-31 NOTE — NURSING NOTE
"Chief Complaint   Patient presents with     Hospital F/U       Initial /54  Pulse 80  Temp 98.2  F (36.8  C) (Oral)  Wt 123 lb (55.8 kg)  SpO2 93%  BMI 19.26 kg/m2 Estimated body mass index is 19.26 kg/(m^2) as calculated from the following:    Height as of 10/18/17: 5' 7\" (1.702 m).    Weight as of this encounter: 123 lb (55.8 kg).  Medication Reconciliation: complete   Daniela Hancock CMA      "

## 2018-01-31 NOTE — MR AVS SNAPSHOT
After Visit Summary   1/31/2018    Sonya Foote    MRN: [id number]           Patient Information     Date Of Birth          1950        Visit Information        Provider Department      1/31/2018 11:40 AM Allan Casey MD [facility]        Today's Diagnoses     Hospital discharge follow-up    -  1    Functional incontinence        Pain in both upper extremities        Chronic systolic congestive heart failure (H)        Essential hypertension        Tension headache           Follow-ups after your visit        Follow-up notes from your care team     Return if symptoms worsen or fail to improve.      Your next 10 appointments already scheduled     Feb 08, 2018 11:30 AM CST   (Arrive by 11:15 AM)   Return Visit with VICTOR M Floyd [facility] ([facility])    [address]  [address] [facility] [address]   [phone]            Feb 08, 2018  1:00 PM CST   (Arrive by 12:45 PM)   Post-Op with Keanu Dawson MD   [facility]rolog[facility] ([facility])    [address]  [address] [facility] [address] [phone]            Feb 19, 2018  9:30 AM CST   (Arrive by 9:15 AM)   Return Visit with Alina Jennings MD   [facility] ([facility])    [address]  [address] [place] [address]   [phone]            Mar 06, 2018  9:15 AM CST   VISUAL FIELD with [facility] EYE VISUAL FIELD   Eye Clinic ([facility])    [address]  [address]   [address] Clin [address]  [facility] [address]   [phone]            Mar 06, 2018  9:45 AM CST   RETURN GLAUCOMA with Marely Robin MD   Eye Clinic ([facility])    [address]  [address]   [address] [address]  [facility] [address]   [phone]             "May 11, 2018 10:50 AM CDT   (Arrive by 10:35 AM)   RETURN DIABETES with Michelle Irizarry MD   [facility] End[facility]ology ([facility])    [address] [address] [phone]              Who to contact     If you have questions or need follow up information about today's clinic visit or your schedule please contact [facility] directly at [phone].  Normal or non-critical lab and imaging results will be communicated to you by MyChart, letter or phone within 4 business days after the clinic has received the results. If you do not hear from us within 7 days, please contact the clinic through [employer] or phone. If you have a critical or abnormal lab result, we will notify you by phone as soon as possible.  Submit refill requests through [employer] or call your pharmacy and they will forward the refill request to us. Please allow 3 business days for your refill to be completed.          Additional Information About Your Visit        [employer] Information     [employer] lets you send messages to your doctor, view your test results, renew your prescriptions, schedule appointments and more. To sign up, go to [email or web address] . Click on \"Log in\" on the left side of the screen, which will take you to the Welcome page. Then click on \"Sign up Now\" on the right side of the page.     You will be asked to enter the access code listed below, as well as some personal information. Please follow the directions to create your username and password.     Your access code is: [email or web address]  Expires: 2018  9:57 AM     Your access code will  in 90 days. If you need help or a new code, please call your [place] clinic or [phone].        Care EveryWhere ID     This is your Care EveryWhere ID. This could be used by other organizations to access your [place] medical records  [id number]        Your Vitals Were     Pulse Temperature Pulse " Oximetry BMI (Body Mass Index)          80 98.2  F (36.8  C) (Oral) 93% 19.26 kg/m2         Blood Pressure from Last 3 Encounters:   01/31/18 100/54   01/17/18 130/66   01/10/18 130/71    Weight from Last 3 Encounters:   01/31/18 123 lb (55.8 kg)   01/04/18 123 lb (55.8 kg)   12/29/17 137 lb (62.1 kg)              Today, you had the following     No orders found for display         Today's Medication Changes          These changes are accurate as of 1/31/18 11:52 AM.  If you have any questions, ask your nurse or doctor.               These medicines have changed or have updated prescriptions.        Dose/Directions    acetaminophen-codeine 300-30 MG per tablet   Commonly known as:  TYLENOL #3   This may have changed:  when to take this   Used for:  Tension headache   Changed by:  Allan Casey MD        Dose:  1 tablet   Take 1 tablet by mouth every 6 hours as needed for pain maximum 3 tablet(s) per day   Quantity:  20 tablet   Refills:  0       atorvastatin 40 MG tablet   Commonly known as:  LIPITOR   This may have changed:  when to take this   Used for:  Hyperlipidemia LDL goal <100        Dose:  40 mg   Take 1 tablet (40 mg) by mouth daily   Quantity:  90 tablet   Refills:  3       brimonidine 0.2 % ophthalmic solution   Commonly known as:  ALPHAGAN   This may have changed:  when to take this   Used for:  Primary open angle glaucoma of both eyes, severe stage        Dose:  1 drop   Place 1 drop into the right eye 3 times daily   Quantity:  15 mL   Refills:  11       hydrochlorothiazide 12.5 MG capsule   Commonly known as:  MICROZIDE   This may have changed:  additional instructions   Used for:  Essential hypertension with goal blood pressure less than 130/80        Dose:  12.5 mg   Take 1 capsule (12.5 mg) by mouth daily   Quantity:  90 capsule   Refills:  3       pantoprazole 40 MG EC tablet   Commonly known as:  PROTONIX   This may have changed:  when to take this   Used for:  Gastroesophageal reflux disease  without esophagitis        Dose:  40 mg   Take 1 tablet (40 mg) by mouth 2 times daily   Quantity:  30 tablet   Refills:  1       Phenytoin Sodium Extended 200 MG Caps   This may have changed:  additional instructions   Used for:  Seizure disorder (H)        Dose:  200 mg   Take 200 mg by mouth 2 times daily   Quantity:  60 capsule   Refills:  0            Where to get your medicines      Some of these will need a paper prescription and others can be bought over the counter.  Ask your nurse if you have questions.     Bring a paper prescription for each of these medications     acetaminophen-codeine 300-30 MG per tablet                Primary Care Provider Office Phone # Fax #    Allan Casey [phone]       [address] [facility] [address]        Equal Access to Services     KARI CARREON : John youo Sojoe, waaxda luqadaha, qaybta kaalmada adeegyada, tonie marroquin. So [facility] [phone].    ATENCIÓN: Si habla español, tiene a briones disposición servicios gratuitos de asistencia lingüística. Ll[facility] [phone].    We comply with applicable federal civil rights laws and [place] laws. We do not discriminate on the basis of race, color, national origin, age, disability, sex, sexual orientation, or gender identity.            Thank you!     Thank you for choosing [facility]  for your care. Our goal is always to provide you with excellent care. Hearing back from our patients is one way we can continue to improve our services. Please take a few minutes to complete the written survey that you may receive in the mail after your visit with us. Thank you!             Your Updated Medication List - Protect others around you: Learn how to safely use, store and throw away your medicines at www.disposemymeds.org.          This list is accurate as of 1/31/18 11:52 AM.  Always use your most recent med list.                   Brand Name  Dispense Instructions for use Diagnosis    ACETAMINOPHEN PO      Take 1,000 mg by mouth every 4 hours as needed for fever Not to exceed 4000 mg/day        acetaminophen-codeine 300-30 MG per tablet    TYLENOL #3    20 tablet    Take 1 tablet by mouth every 6 hours as needed for pain maximum 3 tablet(s) per day    Tension headache       ADVAIR DISKUS 250-50 MCG/DOSE diskus inhaler   Generic drug:  fluticasone-salmeterol      Inhale 1 puff into the lungs 2 times daily        * albuterol (2.5 MG/3ML) 0.083% neb solution     360 mL    INHALE 1 VIAL VIA NEBULIZER EVERY 6 HOURS AS NEEDED    Chronic obstructive pulmonary disease, unspecified COPD type (H)       * albuterol 108 (90 BASE) MCG/ACT Inhaler    PROAIR HFA/PROVENTIL HFA/VENTOLIN HFA    3 Inhaler    Inhale 2 puffs into the lungs every 6 hours as needed for shortness of breath / dyspnea or wheezing    JOSE (obstructive sleep apnea)       aspirin 81 MG EC tablet     90 tablet    Take 1 tablet (81 mg) by mouth daily    Unstable angina (H)       atorvastatin 40 MG tablet    LIPITOR    90 tablet    Take 1 tablet (40 mg) by mouth daily    Hyperlipidemia LDL goal <100       blood glucose monitoring lancets     100 each    Use to test blood sugar 3 times daily or as directed.    Type 2 diabetes mellitus with diabetic peripheral angiopathy without gangrene, without long-term current use of insulin (H)       blood glucose monitoring meter device kit     1 kit    Use to test blood sugars 3 times daily or as directed.    Type 2 diabetes, HbA1C goal < 8% (H)       blood glucose monitoring test strip    ONETOUCH ULTRA    3 Box    Use to test blood sugars 3 times daily or as directed.    Type 2 diabetes mellitus with diabetic peripheral angiopathy without gangrene, without long-term current use of insulin (H)       brimonidine 0.2 % ophthalmic solution    ALPHAGAN    15 mL    Place 1 drop into the right eye 3 times daily    Primary open angle glaucoma of both eyes, severe stage        calcium citrate-vitamin D 315-250 MG-UNIT Tabs per tablet    CITRACAL    120 tablet    Take 2 tablets by mouth daily    Osteoporosis       cetirizine 10 MG tablet    zyrTEC    90 tablet    Take 1 tablet (10 mg) by mouth daily as needed for allergies    Seasonal allergic rhinitis, unspecified allergic rhinitis trigger       COMPAZINE PO      Take 10 mg by mouth every 8 hours as needed for nausea        CYANOCOBALAMIN PO      Take 2,000 mcg by mouth daily        diclofenac 1 % Gel topical gel    VOLTAREN    100 g    Apply 2 g topically 4 times daily to hands    Primary osteoarthritis of both hands       dorzolamide 2 % ophthalmic solution    TRUSOPT     Place 1 drop into the right eye 2 times daily        dorzolamide-timolol 2-0.5 % ophthalmic solution    COSOPT          EPINEPHrine 0.15 MG/0.3ML injection 2-pack    EPIPEN JR    0.6 mL    Inject 0.3 mLs (0.15 mg) into the muscle as needed for anaphylaxis    Bee sting reaction, undetermined intent, subsequent encounter       escitalopram 10 MG tablet    LEXAPRO          ESTRACE VAGINAL 0.1 MG/GM cream   Generic drug:  estradiol     42.5 g    USE DIME SIZE AMOUNT 3X A WEEK AT NIGHT    Atrophic vaginitis       estradiol 1 MG tablet    ESTRACE    90 tablet    Take 1 tablet (1 mg) by mouth daily    Person who has had sex change operation       ferrous sulfate 325 (65 FE) MG tablet    IRON    60 tablet    Take 1 tablet (325 mg) by mouth 2 times daily With meals        fluticasone 50 MCG/ACT spray    FLONASE     Spray 1 spray into both nostrils daily        hydrochlorothiazide 12.5 MG capsule    MICROZIDE    90 capsule    Take 1 capsule (12.5 mg) by mouth daily    Essential hypertension with goal blood pressure less than 130/80       INCRUSE ELLIPTA 62.5 MCG/INH oral inhaler   Generic drug:  umeclidinium      Inhale 1 puff into the lungs daily        RENÉE Pack      Take 1 packet by mouth 2 times daily        lactulose 10 GM/15ML solution    CHRONULAC     Take 20 g by  mouth as needed for constipation        latanoprost 0.005 % ophthalmic solution    XALATAN    2.5 mL    Place 1 drop into both eyes At Bedtime    Primary open angle glaucoma, stage unspecified       levETIRAcetam 500 MG tablet    KEPPRA    180 tablet    Take 1 tablet (500 mg) by mouth 2 times daily    Nausea       lidocaine 5 % Patch    LIDODERM    30 patch    APPLY 1 TO 3 PATCHES TO PAINFUL AREA AT ONCE FOR UP TO 12 HOURS WITHIN A 24 HOUR PERIOD REMOVE AFTER 12 HOURS    Chronic pain syndrome, 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 (H)       lisinopril 10 MG tablet    PRINIVIL/ZESTRIL    90 tablet    Take 1 tablet (10 mg) by mouth daily    Essential hypertension with goal blood pressure less than 130/80       MEDICATION GIVEN BY INTRATHECAL PUMP - INSTRUCTION      continuous January 4, 2018  - per [facility] [phone]: Conc: Bupivacaine 20 mg/mL and Fentanyl 2000 mcg/mL, morphine 2 mg/mL Continuous:  Fentanyl 795.9 mcg/day, Bupivacaine 7.759 mg/day, Morphine 0.7959 mg/day  Boluses: Up to 7 boluses per 24-hr period of Bupivicaine 0.599 mg and Fentanyl 59.9 mcg morphine 0.0599 mg  Pump Refill Date at [facility]: 3/1/18        metFORMIN 500 MG 24 hr tablet    GLUCOPHAGE-XR    90 tablet    Take 1 tablet (500 mg) by mouth daily (with dinner)    Type 2 diabetes mellitus with diabetic peripheral angiopathy and gangrene, without long-term current use of insulin (H)       metoprolol succinate 25 MG 24 hr tablet    TOPROL-XL    30 tablet    TAKE 1 TABLET (25 MG) BY MOUTH DAILY    Old myocardial infarction       MULTIVITAMIN PO      Take 1 tablet by mouth daily        nitroGLYcerin 0.4 MG sublingual tablet    NITROSTAT    25 tablet    Place 1 tablet (0.4 mg) under the tongue every 5 minutes as needed for chest pain if you are still having symptoms after 3 doses (15 minutes) call 911.    Chronic systolic congestive heart failure (H), Old myocardial infarction        ONDANSETRON PO      Take 4 mg by mouth 3 times daily        * order for DME     1 Month    Equipment being ordered: Depends briefs    Incontinence       * order for DME     1 Device    Equipment being ordered: Power Wheelchair    CVA (cerebral infarction), HTN (hypertension)       * order for DME     1 Box    Equipment being ordered: Glucerna daily shakes with each meal    Type 2 diabetes mellitus with other diabetic neurological complication       * order for DME     1 Units    Equipment being ordered: Nebulizer and tubing supplies    Simple chronic bronchitis (H)       * order for DME     1 Device    Equipment being ordered: CPAP supplies mask, hose, filters, cushion fax to [facility] at [phone] Disp: 10 DeviceRefills: prn Class: Local PrintStart: 2/10/2017    Chronic obstructive pulmonary disease, unspecified COPD type (H)       * order for DME     10 Device    Equipment being ordered: CPAP supplies mask, hose, filters, cushion  fax to [facility] at [phone]    COPD (chronic obstructive pulmonary disease) (H)       * order for DME     1 Device    Hospital bed with side rails    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 (H)       * order for DME     1 Device    Full electric hospital bed with half rails  Dx: X17383, I110, J449 Length of need: lifetime    Status post bilateral above knee amputation (H)       * order for DME     1 Device    Wheel Chair Cushion: 18 x 18 inch Roho cushion    Status post bilateral above knee amputation (H)       pantoprazole 40 MG EC tablet    PROTONIX    30 tablet    Take 1 tablet (40 mg) by mouth 2 times daily    Gastroesophageal reflux disease without esophagitis       Phenytoin Sodium Extended 200 MG Caps     60 capsule    Take 200 mg by mouth 2 times daily    Seizure disorder (H)       polyethylene glycol Packet    MIRALAX/GLYCOLAX     Take 17 g by mouth daily Dissolved in water or juice        * pregabalin 75 MG capsule    LYRICA    120  capsule    Take 2 capsules (150 mg) by mouth 2 times daily    Pain in both upper extremities       * LYRICA 150 MG capsule   Generic drug:  pregabalin           risperiDONE 0.5 MG tablet    risperDAL     Take 0.5 mg by mouth At Bedtime        SPIRIVA HANDIHALER 18 MCG capsule   Generic drug:  tiotropium           sucralfate 1 GM tablet    CARAFATE    120 tablet    Take 1 tablet (1 g) by mouth 4 times daily May dissolve in 10 mL water is necessary. (Start upon completion of carafate suspension)    Adjustment disorder with depressed mood       * traZODone 100 MG tablet    DESYREL    90 tablet    Take 1.5 tablets (150 mg) by mouth At Bedtime    Anxiety state       * traZODone 50 MG tablet    DESYREL          vitamin D 2000 UNITS tablet     100 tablet    Take 2,000 Units by mouth daily.        zinc 50 MG Tabs      Take 1 tablet by mouth daily        * Notice:  This list has 15 medication(s) that are the same as other medications prescribed for you. Read the directions carefully, and ask your doctor or other care provider to review them with you.

## 2018-02-01 ASSESSMENT — PATIENT HEALTH QUESTIONNAIRE - PHQ9: SUM OF ALL RESPONSES TO PHQ QUESTIONS 1-9: 6

## 2018-02-08 ENCOUNTER — APPOINTMENT (OUTPATIENT)
Dept: GENERAL RADIOLOGY | Facility: CLINIC | Age: 68
End: 2018-02-08
Attending: EMERGENCY MEDICINE
Payer: COMMERCIAL

## 2018-02-08 ENCOUNTER — CARE COORDINATION (OUTPATIENT)
Dept: GERIATRIC MEDICINE | Facility: CLINIC | Age: 68
End: 2018-02-08

## 2018-02-08 ENCOUNTER — APPOINTMENT (OUTPATIENT)
Dept: CT IMAGING | Facility: CLINIC | Age: 68
End: 2018-02-08
Attending: EMERGENCY MEDICINE
Payer: COMMERCIAL

## 2018-02-08 ENCOUNTER — HOSPITAL ENCOUNTER (OUTPATIENT)
Facility: CLINIC | Age: 68
Setting detail: OBSERVATION
Discharge: HOME | End: 2018-02-09
Attending: EMERGENCY MEDICINE | Admitting: EMERGENCY MEDICINE
Payer: COMMERCIAL

## 2018-02-08 DIAGNOSIS — K92.2 UPPER GI BLEED: ICD-10-CM

## 2018-02-08 DIAGNOSIS — R11.2 NON-INTRACTABLE VOMITING WITH NAUSEA, UNSPECIFIED VOMITING TYPE: Primary | ICD-10-CM

## 2018-02-08 DIAGNOSIS — R00.1 SEVERE SINUS BRADYCARDIA: ICD-10-CM

## 2018-02-08 DIAGNOSIS — K21.9 GASTROESOPHAGEAL REFLUX DISEASE WITHOUT ESOPHAGITIS: ICD-10-CM

## 2018-02-08 DIAGNOSIS — H40.1133 PRIMARY OPEN ANGLE GLAUCOMA OF BOTH EYES, SEVERE STAGE: ICD-10-CM

## 2018-02-08 DIAGNOSIS — R07.0 THROAT PAIN: ICD-10-CM

## 2018-02-08 PROBLEM — K92.1 BLACK TARRY STOOLS: Status: ACTIVE | Noted: 2018-02-08

## 2018-02-08 LAB
ABO + RH BLD: NORMAL
ABO + RH BLD: NORMAL
ALBUMIN SERPL-MCNC: 2.9 G/DL (ref 3.4–5)
ALBUMIN UR-MCNC: 10 MG/DL
ALP SERPL-CCNC: 250 U/L (ref 40–150)
ALT SERPL W P-5'-P-CCNC: 60 U/L (ref 0–50)
ANION GAP SERPL CALCULATED.3IONS-SCNC: 10 MMOL/L (ref 3–14)
APPEARANCE UR: CLEAR
AST SERPL W P-5'-P-CCNC: 44 U/L (ref 0–45)
AST SERPL W P-5'-P-CCNC: ABNORMAL U/L (ref 0–45)
BASOPHILS # BLD AUTO: 0 10E9/L (ref 0–0.2)
BASOPHILS NFR BLD AUTO: 0.1 %
BILIRUB DIRECT SERPL-MCNC: <0.1 MG/DL (ref 0–0.2)
BILIRUB SERPL-MCNC: 0.2 MG/DL (ref 0.2–1.3)
BILIRUB UR QL STRIP: NEGATIVE
BLD GP AB SCN SERPL QL: NORMAL
BLOOD BANK CMNT PATIENT-IMP: NORMAL
BUN SERPL-MCNC: 8 MG/DL (ref 7–30)
CALCIUM SERPL-MCNC: 9.5 MG/DL (ref 8.5–10.1)
CHLORIDE SERPL-SCNC: 105 MMOL/L (ref 94–109)
CO2 BLDCOV-SCNC: 27 MMOL/L (ref 21–28)
CO2 BLDCOV-SCNC: 28 MMOL/L (ref 21–28)
CO2 SERPL-SCNC: 25 MMOL/L (ref 20–32)
COLOR UR AUTO: YELLOW
CREAT SERPL-MCNC: 0.43 MG/DL (ref 0.52–1.04)
DIFFERENTIAL METHOD BLD: ABNORMAL
EOSINOPHIL # BLD AUTO: 0.2 10E9/L (ref 0–0.7)
EOSINOPHIL NFR BLD AUTO: 1.9 %
ERYTHROCYTE [DISTWIDTH] IN BLOOD BY AUTOMATED COUNT: 16.5 % (ref 10–15)
FLUAV+FLUBV AG SPEC QL: NEGATIVE
FLUAV+FLUBV AG SPEC QL: NEGATIVE
GFR SERPL CREATININE-BSD FRML MDRD: >90 ML/MIN/1.7M2
GLUCOSE SERPL-MCNC: 95 MG/DL (ref 70–99)
GLUCOSE UR STRIP-MCNC: NEGATIVE MG/DL
HCT VFR BLD AUTO: 39.9 % (ref 35–47)
HGB BLD-MCNC: 12.7 G/DL (ref 11.7–15.7)
HGB BLD-MCNC: 13 G/DL (ref 11.7–15.7)
HGB BLD-MCNC: 13.4 G/DL (ref 11.7–15.7)
HGB UR QL STRIP: ABNORMAL
HYALINE CASTS #/AREA URNS LPF: 1 /LPF (ref 0–2)
IMM GRANULOCYTES # BLD: 0 10E9/L (ref 0–0.4)
IMM GRANULOCYTES NFR BLD: 0.3 %
INR PPP: 1.04 (ref 0.86–1.14)
INTERPRETATION ECG - MUSE: NORMAL
KETONES UR STRIP-MCNC: NEGATIVE MG/DL
LACTATE BLD-SCNC: 1.3 MMOL/L (ref 0.7–2.1)
LACTATE BLD-SCNC: 2.5 MMOL/L (ref 0.7–2.1)
LEUKOCYTE ESTERASE UR QL STRIP: NEGATIVE
LYMPHOCYTES # BLD AUTO: 1.2 10E9/L (ref 0.8–5.3)
LYMPHOCYTES NFR BLD AUTO: 14.8 %
MCH RBC QN AUTO: 27.9 PG (ref 26.5–33)
MCHC RBC AUTO-ENTMCNC: 33.6 G/DL (ref 31.5–36.5)
MCV RBC AUTO: 83 FL (ref 78–100)
MONOCYTES # BLD AUTO: 0.8 10E9/L (ref 0–1.3)
MONOCYTES NFR BLD AUTO: 9.9 %
MUCOUS THREADS #/AREA URNS LPF: PRESENT /LPF
NEUTROPHILS # BLD AUTO: 5.8 10E9/L (ref 1.6–8.3)
NEUTROPHILS NFR BLD AUTO: 73 %
NITRATE UR QL: NEGATIVE
NRBC # BLD AUTO: 0 10*3/UL
NRBC BLD AUTO-RTO: 0 /100
PCO2 BLDV: 32 MM HG (ref 40–50)
PCO2 BLDV: 43 MM HG (ref 40–50)
PH BLDV: 7.42 PH (ref 7.32–7.43)
PH BLDV: 7.52 PH (ref 7.32–7.43)
PH UR STRIP: 8 PH (ref 5–7)
PLATELET # BLD AUTO: 256 10E9/L (ref 150–450)
PO2 BLDV: 21 MM HG (ref 25–47)
PO2 BLDV: 39 MM HG (ref 25–47)
POTASSIUM SERPL-SCNC: 4 MMOL/L (ref 3.4–5.3)
PROT SERPL-MCNC: 8.8 G/DL (ref 6.8–8.8)
RBC # BLD AUTO: 4.8 10E12/L (ref 3.8–5.2)
RBC #/AREA URNS AUTO: 89 /HPF (ref 0–2)
SAO2 % BLDV FROM PO2: 34 %
SAO2 % BLDV FROM PO2: 80 %
SODIUM SERPL-SCNC: 139 MMOL/L (ref 133–144)
SOURCE: ABNORMAL
SP GR UR STRIP: 1.01 (ref 1–1.03)
SPECIMEN EXP DATE BLD: NORMAL
SPECIMEN SOURCE: NORMAL
TROPONIN I SERPL-MCNC: <0.015 UG/L (ref 0–0.04)
UROBILINOGEN UR STRIP-MCNC: NORMAL MG/DL (ref 0–2)
WBC # BLD AUTO: 8 10E9/L (ref 4–11)
WBC #/AREA URNS AUTO: 3 /HPF (ref 0–2)

## 2018-02-08 PROCEDURE — 85610 PROTHROMBIN TIME: CPT | Performed by: EMERGENCY MEDICINE

## 2018-02-08 PROCEDURE — G0378 HOSPITAL OBSERVATION PER HR: HCPCS

## 2018-02-08 PROCEDURE — 84155 ASSAY OF PROTEIN SERUM: CPT

## 2018-02-08 PROCEDURE — 84460 ALANINE AMINO (ALT) (SGPT): CPT

## 2018-02-08 PROCEDURE — 85018 HEMOGLOBIN: CPT | Mod: 91 | Performed by: PHYSICIAN ASSISTANT

## 2018-02-08 PROCEDURE — 25000128 H RX IP 250 OP 636: Performed by: PHYSICIAN ASSISTANT

## 2018-02-08 PROCEDURE — 83605 ASSAY OF LACTIC ACID: CPT

## 2018-02-08 PROCEDURE — 25000132 ZZH RX MED GY IP 250 OP 250 PS 637: Performed by: PHYSICIAN ASSISTANT

## 2018-02-08 PROCEDURE — 74176 CT ABD & PELVIS W/O CONTRAST: CPT

## 2018-02-08 PROCEDURE — 96374 THER/PROPH/DIAG INJ IV PUSH: CPT

## 2018-02-08 PROCEDURE — 36415 COLL VENOUS BLD VENIPUNCTURE: CPT | Performed by: PHYSICIAN ASSISTANT

## 2018-02-08 PROCEDURE — 82435 ASSAY OF BLOOD CHLORIDE: CPT

## 2018-02-08 PROCEDURE — 86901 BLOOD TYPING SEROLOGIC RH(D): CPT | Performed by: EMERGENCY MEDICINE

## 2018-02-08 PROCEDURE — 25000132 ZZH RX MED GY IP 250 OP 250 PS 637: Performed by: EMERGENCY MEDICINE

## 2018-02-08 PROCEDURE — 84132 ASSAY OF SERUM POTASSIUM: CPT

## 2018-02-08 PROCEDURE — 84484 ASSAY OF TROPONIN QUANT: CPT | Performed by: EMERGENCY MEDICINE

## 2018-02-08 PROCEDURE — 82310 ASSAY OF CALCIUM: CPT

## 2018-02-08 PROCEDURE — 96376 TX/PRO/DX INJ SAME DRUG ADON: CPT

## 2018-02-08 PROCEDURE — 85025 COMPLETE CBC W/AUTO DIFF WBC: CPT | Performed by: EMERGENCY MEDICINE

## 2018-02-08 PROCEDURE — 87804 INFLUENZA ASSAY W/OPTIC: CPT | Performed by: PHYSICIAN ASSISTANT

## 2018-02-08 PROCEDURE — 25000128 H RX IP 250 OP 636: Performed by: EMERGENCY MEDICINE

## 2018-02-08 PROCEDURE — 96375 TX/PRO/DX INJ NEW DRUG ADDON: CPT

## 2018-02-08 PROCEDURE — 25000125 ZZHC RX 250: Performed by: PHYSICIAN ASSISTANT

## 2018-02-08 PROCEDURE — 86900 BLOOD TYPING SEROLOGIC ABO: CPT | Performed by: EMERGENCY MEDICINE

## 2018-02-08 PROCEDURE — 93005 ELECTROCARDIOGRAM TRACING: CPT

## 2018-02-08 PROCEDURE — 82803 BLOOD GASES ANY COMBINATION: CPT

## 2018-02-08 PROCEDURE — 25000125 ZZHC RX 250: Performed by: EMERGENCY MEDICINE

## 2018-02-08 PROCEDURE — 84075 ASSAY ALKALINE PHOSPHATASE: CPT

## 2018-02-08 PROCEDURE — 99285 EMERGENCY DEPT VISIT HI MDM: CPT | Mod: 25

## 2018-02-08 PROCEDURE — 82374 ASSAY BLOOD CARBON DIOXIDE: CPT

## 2018-02-08 PROCEDURE — 82247 BILIRUBIN TOTAL: CPT

## 2018-02-08 PROCEDURE — 96361 HYDRATE IV INFUSION ADD-ON: CPT | Mod: 59

## 2018-02-08 PROCEDURE — 84520 ASSAY OF UREA NITROGEN: CPT

## 2018-02-08 PROCEDURE — 71045 X-RAY EXAM CHEST 1 VIEW: CPT

## 2018-02-08 PROCEDURE — 81001 URINALYSIS AUTO W/SCOPE: CPT | Performed by: EMERGENCY MEDICINE

## 2018-02-08 PROCEDURE — 82565 ASSAY OF CREATININE: CPT

## 2018-02-08 PROCEDURE — 93010 ELECTROCARDIOGRAM REPORT: CPT | Mod: Z6 | Performed by: EMERGENCY MEDICINE

## 2018-02-08 PROCEDURE — 87086 URINE CULTURE/COLONY COUNT: CPT | Performed by: PHYSICIAN ASSISTANT

## 2018-02-08 PROCEDURE — 80076 HEPATIC FUNCTION PANEL: CPT | Performed by: EMERGENCY MEDICINE

## 2018-02-08 PROCEDURE — 84450 TRANSFERASE (AST) (SGOT): CPT | Performed by: EMERGENCY MEDICINE

## 2018-02-08 PROCEDURE — 82040 ASSAY OF SERUM ALBUMIN: CPT

## 2018-02-08 PROCEDURE — 86850 RBC ANTIBODY SCREEN: CPT | Performed by: EMERGENCY MEDICINE

## 2018-02-08 PROCEDURE — 84295 ASSAY OF SERUM SODIUM: CPT

## 2018-02-08 PROCEDURE — 99220 ZZC INITIAL OBSERVATION CARE,LEVL III: CPT | Mod: 25 | Performed by: EMERGENCY MEDICINE

## 2018-02-08 PROCEDURE — 82947 ASSAY GLUCOSE BLOOD QUANT: CPT

## 2018-02-08 PROCEDURE — 51798 US URINE CAPACITY MEASURE: CPT

## 2018-02-08 RX ORDER — METOPROLOL SUCCINATE 25 MG/1
25 TABLET, EXTENDED RELEASE ORAL ONCE
Status: COMPLETED | OUTPATIENT
Start: 2018-02-08 | End: 2018-02-08

## 2018-02-08 RX ORDER — METOPROLOL SUCCINATE 25 MG/1
25 TABLET, EXTENDED RELEASE ORAL DAILY
Status: DISCONTINUED | OUTPATIENT
Start: 2018-02-08 | End: 2018-02-09 | Stop reason: HOSPADM

## 2018-02-08 RX ORDER — LISINOPRIL 10 MG/1
10 TABLET ORAL ONCE
Status: COMPLETED | OUTPATIENT
Start: 2018-02-08 | End: 2018-02-08

## 2018-02-08 RX ORDER — ATORVASTATIN CALCIUM 40 MG/1
40 TABLET, FILM COATED ORAL AT BEDTIME
Status: DISCONTINUED | OUTPATIENT
Start: 2018-02-08 | End: 2018-02-09 | Stop reason: HOSPADM

## 2018-02-08 RX ORDER — PANTOPRAZOLE SODIUM 40 MG/1
40 TABLET, DELAYED RELEASE ORAL DAILY
Status: DISCONTINUED | OUTPATIENT
Start: 2018-02-08 | End: 2018-02-09 | Stop reason: HOSPADM

## 2018-02-08 RX ORDER — FERROUS SULFATE 325(65) MG
325 TABLET ORAL 2 TIMES DAILY
Status: DISCONTINUED | OUTPATIENT
Start: 2018-02-08 | End: 2018-02-09 | Stop reason: HOSPADM

## 2018-02-08 RX ORDER — ONDANSETRON 2 MG/ML
4 INJECTION INTRAMUSCULAR; INTRAVENOUS EVERY 6 HOURS PRN
Status: DISCONTINUED | OUTPATIENT
Start: 2018-02-08 | End: 2018-02-09

## 2018-02-08 RX ORDER — HYDROCHLOROTHIAZIDE 12.5 MG/1
12.5 CAPSULE ORAL DAILY
Status: DISCONTINUED | OUTPATIENT
Start: 2018-02-08 | End: 2018-02-09 | Stop reason: HOSPADM

## 2018-02-08 RX ORDER — LATANOPROST 50 UG/ML
1 SOLUTION/ DROPS OPHTHALMIC AT BEDTIME
Status: DISCONTINUED | OUTPATIENT
Start: 2018-02-08 | End: 2018-02-09 | Stop reason: HOSPADM

## 2018-02-08 RX ORDER — METFORMIN HCL 500 MG
500 TABLET, EXTENDED RELEASE 24 HR ORAL
Status: DISCONTINUED | OUTPATIENT
Start: 2018-02-08 | End: 2018-02-09 | Stop reason: HOSPADM

## 2018-02-08 RX ORDER — ONDANSETRON 2 MG/ML
4 INJECTION INTRAMUSCULAR; INTRAVENOUS ONCE
Status: COMPLETED | OUTPATIENT
Start: 2018-02-08 | End: 2018-02-08

## 2018-02-08 RX ORDER — PROMETHAZINE HYDROCHLORIDE 25 MG/ML
25 INJECTION, SOLUTION INTRAMUSCULAR; INTRAVENOUS ONCE
Status: COMPLETED | OUTPATIENT
Start: 2018-02-08 | End: 2018-02-08

## 2018-02-08 RX ORDER — ALBUTEROL SULFATE 0.83 MG/ML
2.5 SOLUTION RESPIRATORY (INHALATION) EVERY 4 HOURS PRN
Status: DISCONTINUED | OUTPATIENT
Start: 2018-02-08 | End: 2018-02-09 | Stop reason: HOSPADM

## 2018-02-08 RX ORDER — POLYETHYLENE GLYCOL 3350 17 G/17G
17 POWDER, FOR SOLUTION ORAL DAILY
Status: DISCONTINUED | OUTPATIENT
Start: 2018-02-08 | End: 2018-02-09 | Stop reason: HOSPADM

## 2018-02-08 RX ORDER — PROCHLORPERAZINE MALEATE 5 MG
5 TABLET ORAL EVERY 6 HOURS PRN
Status: DISCONTINUED | OUTPATIENT
Start: 2018-02-08 | End: 2018-02-09 | Stop reason: HOSPADM

## 2018-02-08 RX ORDER — NALOXONE HYDROCHLORIDE 0.4 MG/ML
.1-.4 INJECTION, SOLUTION INTRAMUSCULAR; INTRAVENOUS; SUBCUTANEOUS
Status: DISCONTINUED | OUTPATIENT
Start: 2018-02-08 | End: 2018-02-09 | Stop reason: HOSPADM

## 2018-02-08 RX ORDER — SUCRALFATE 1 G/1
1 TABLET ORAL 4 TIMES DAILY
Status: DISCONTINUED | OUTPATIENT
Start: 2018-02-08 | End: 2018-02-09 | Stop reason: HOSPADM

## 2018-02-08 RX ORDER — PREGABALIN 75 MG/1
150 CAPSULE ORAL 2 TIMES DAILY
Status: DISCONTINUED | OUTPATIENT
Start: 2018-02-08 | End: 2018-02-09 | Stop reason: HOSPADM

## 2018-02-08 RX ORDER — ONDANSETRON 4 MG/1
4 TABLET, ORALLY DISINTEGRATING ORAL EVERY 6 HOURS PRN
Status: DISCONTINUED | OUTPATIENT
Start: 2018-02-08 | End: 2018-02-09

## 2018-02-08 RX ORDER — ESTRADIOL 1 MG/1
1 TABLET ORAL DAILY
Status: DISCONTINUED | OUTPATIENT
Start: 2018-02-08 | End: 2018-02-09 | Stop reason: HOSPADM

## 2018-02-08 RX ORDER — LEVETIRACETAM 500 MG/1
500 TABLET ORAL 2 TIMES DAILY
Status: DISCONTINUED | OUTPATIENT
Start: 2018-02-08 | End: 2018-02-09 | Stop reason: HOSPADM

## 2018-02-08 RX ORDER — LACTULOSE 10 G/15ML
20 SOLUTION ORAL DAILY PRN
Status: DISCONTINUED | OUTPATIENT
Start: 2018-02-08 | End: 2018-02-09

## 2018-02-08 RX ORDER — ESCITALOPRAM OXALATE 10 MG/1
10 TABLET ORAL DAILY
Status: DISCONTINUED | OUTPATIENT
Start: 2018-02-08 | End: 2018-02-09 | Stop reason: HOSPADM

## 2018-02-08 RX ORDER — LISINOPRIL 10 MG/1
10 TABLET ORAL DAILY
Status: DISCONTINUED | OUTPATIENT
Start: 2018-02-08 | End: 2018-02-09 | Stop reason: HOSPADM

## 2018-02-08 RX ORDER — RISPERIDONE 0.5 MG/1
0.5 TABLET ORAL AT BEDTIME
Status: DISCONTINUED | OUTPATIENT
Start: 2018-02-08 | End: 2018-02-09 | Stop reason: HOSPADM

## 2018-02-08 RX ORDER — HYDROMORPHONE HYDROCHLORIDE 2 MG/1
2 TABLET ORAL
Status: DISCONTINUED | OUTPATIENT
Start: 2018-02-08 | End: 2018-02-09 | Stop reason: HOSPADM

## 2018-02-08 RX ORDER — LEVETIRACETAM 500 MG/1
500 TABLET ORAL ONCE
Status: COMPLETED | OUTPATIENT
Start: 2018-02-08 | End: 2018-02-08

## 2018-02-08 RX ORDER — BRIMONIDINE TARTRATE 2 MG/ML
1 SOLUTION/ DROPS OPHTHALMIC 2 TIMES DAILY
Status: DISCONTINUED | OUTPATIENT
Start: 2018-02-08 | End: 2018-02-09 | Stop reason: HOSPADM

## 2018-02-08 RX ORDER — HYDROCHLOROTHIAZIDE 12.5 MG/1
12.5 CAPSULE ORAL ONCE
Status: COMPLETED | OUTPATIENT
Start: 2018-02-08 | End: 2018-02-08

## 2018-02-08 RX ORDER — PROCHLORPERAZINE 25 MG
12.5 SUPPOSITORY, RECTAL RECTAL EVERY 12 HOURS PRN
Status: DISCONTINUED | OUTPATIENT
Start: 2018-02-08 | End: 2018-02-09 | Stop reason: HOSPADM

## 2018-02-08 RX ORDER — DORZOLAMIDE HCL 20 MG/ML
1 SOLUTION/ DROPS OPHTHALMIC 2 TIMES DAILY
Status: DISCONTINUED | OUTPATIENT
Start: 2018-02-08 | End: 2018-02-09 | Stop reason: HOSPADM

## 2018-02-08 RX ADMIN — LEVETIRACETAM 500 MG: 500 TABLET, FILM COATED ORAL at 12:00

## 2018-02-08 RX ADMIN — DORZOLAMIDE HYDROCHLORIDE 1 DROP: 20 SOLUTION OPHTHALMIC at 21:55

## 2018-02-08 RX ADMIN — ACETAMINOPHEN AND CODEINE PHOSPHATE 1 TABLET: 300; 30 TABLET ORAL at 22:35

## 2018-02-08 RX ADMIN — SUCRALFATE 1 G: 1 TABLET ORAL at 16:43

## 2018-02-08 RX ADMIN — RISPERIDONE 0.5 MG: 0.5 TABLET ORAL at 22:34

## 2018-02-08 RX ADMIN — PREGABALIN 150 MG: 75 CAPSULE ORAL at 21:52

## 2018-02-08 RX ADMIN — HYDROCHLOROTHIAZIDE 12.5 MG: 12.5 CAPSULE ORAL at 12:00

## 2018-02-08 RX ADMIN — FERROUS SULFATE 325 MG: 325 TABLET, FILM COATED ORAL at 21:52

## 2018-02-08 RX ADMIN — ACETAMINOPHEN AND CODEINE PHOSPHATE 1 TABLET: 300; 30 TABLET ORAL at 16:42

## 2018-02-08 RX ADMIN — LEVETIRACETAM 500 MG: 500 TABLET ORAL at 21:52

## 2018-02-08 RX ADMIN — SUCRALFATE 1 G: 1 TABLET ORAL at 21:51

## 2018-02-08 RX ADMIN — ONDANSETRON 4 MG: 2 INJECTION INTRAMUSCULAR; INTRAVENOUS at 12:10

## 2018-02-08 RX ADMIN — BRIMONIDINE TARTRATE 1 DROP: 2 SOLUTION OPHTHALMIC at 21:50

## 2018-02-08 RX ADMIN — PANTOPRAZOLE SODIUM 40 MG: 40 TABLET, DELAYED RELEASE ORAL at 16:43

## 2018-02-08 RX ADMIN — PANTOPRAZOLE SODIUM 40 MG: 40 INJECTION, POWDER, FOR SOLUTION INTRAVENOUS at 09:24

## 2018-02-08 RX ADMIN — SODIUM CHLORIDE 1000 ML: 9 INJECTION, SOLUTION INTRAVENOUS at 09:24

## 2018-02-08 RX ADMIN — HYDROMORPHONE HYDROCHLORIDE 2 MG: 2 TABLET ORAL at 18:58

## 2018-02-08 RX ADMIN — PROCHLORPERAZINE EDISYLATE 5 MG: 5 INJECTION INTRAMUSCULAR; INTRAVENOUS at 12:43

## 2018-02-08 RX ADMIN — ONDANSETRON 4 MG: 2 INJECTION INTRAMUSCULAR; INTRAVENOUS at 19:33

## 2018-02-08 RX ADMIN — PROMETHAZINE HYDROCHLORIDE 25 MG: 25 INJECTION INTRAMUSCULAR; INTRAVENOUS at 20:34

## 2018-02-08 RX ADMIN — TRAZODONE HYDROCHLORIDE 150 MG: 100 TABLET ORAL at 22:34

## 2018-02-08 RX ADMIN — METOPROLOL SUCCINATE 25 MG: 25 TABLET, EXTENDED RELEASE ORAL at 16:43

## 2018-02-08 RX ADMIN — LISINOPRIL 10 MG: 10 TABLET ORAL at 12:00

## 2018-02-08 RX ADMIN — LATANOPROST 1 DROP: 50 SOLUTION OPHTHALMIC at 22:33

## 2018-02-08 RX ADMIN — METOPROLOL SUCCINATE 25 MG: 25 TABLET, EXTENDED RELEASE ORAL at 12:00

## 2018-02-08 RX ADMIN — METFORMIN HYDROCHLORIDE 500 MG: 500 TABLET, EXTENDED RELEASE ORAL at 16:43

## 2018-02-08 RX ADMIN — ATORVASTATIN CALCIUM 40 MG: 40 TABLET, FILM COATED ORAL at 22:34

## 2018-02-08 ASSESSMENT — ENCOUNTER SYMPTOMS
DIARRHEA: 0
VOMITING: 1
BLOOD IN STOOL: 1
ABDOMINAL PAIN: 1
FEVER: 0
COUGH: 0
LIGHT-HEADEDNESS: 0
SHORTNESS OF BREATH: 0

## 2018-02-08 NOTE — ED PROVIDER NOTES
History     Chief Complaint   Patient presents with     Melena     HPI  Sonya Foote is a 68 year old transgender female with history of chronic erosive GERD, esophagitis with history of GI bleed, CAD status post PCI, HFpEF, COPD, PAD status post bilateral AKA, CVA ×3, and non-insulin-dependent type 2 diabetes who presents to the emergency department today via EMS due to concern for melena.  The patient reports that this morning she had a bowel movement with stool that appeared black and tarry.  She thus contacted EMS who brought her here for further evaluation.  The patient reports prior to the one bowel movement earlier this morning, stools recently have been at baseline.  The patient does report a history of multiple episodes of gastrointestinal bleeding with melena, last in 2014.  The patient also does report that she has had some intermittent nausea and did have an episode of vomiting yesterday evening, with what she describes the coffee-ground appearance.  The patient reports some diffuse lower abdominal discomfort which she states is related to recent suprapubic catheter placement.  No new chest pain, shortness of breath or lightheadedness. The patient is on aspirin daily but is not on any other blood thinning medication.  She has no known liver disease.        No current facility-administered medications for this encounter.      Current Outpatient Prescriptions   Medication     acetaminophen-codeine (TYLENOL #3) 300-30 MG per tablet     escitalopram (LEXAPRO) 10 MG tablet     dorzolamide-timolol (COSOPT) 2-0.5 % ophthalmic solution     LYRICA 150 MG capsule     SPIRIVA HANDIHALER 18 MCG capsule     traZODone (DESYREL) 50 MG tablet     lactulose (CHRONULAC) 10 GM/15ML solution     Prochlorperazine Maleate (COMPAZINE PO)     ESTRACE VAGINAL 0.1 MG/GM cream     pantoprazole (PROTONIX) 40 MG EC tablet     metFORMIN (GLUCOPHAGE-XR) 500 MG 24 hr tablet     brimonidine (ALPHAGAN) 0.2 % ophthalmic solution      sucralfate (CARAFATE) 1 GM tablet     albuterol (PROAIR HFA/PROVENTIL HFA/VENTOLIN HFA) 108 (90 BASE) MCG/ACT Inhaler     umeclidinium (INCRUSE ELLIPTA) 62.5 MCG/INH oral inhaler     ONDANSETRON PO     lidocaine (LIDODERM) 5 % Patch     blood glucose monitoring (ONE TOUCH ULTRASOFT) lancets     blood glucose monitoring (ONE TOUCH ULTRA) test strip     metoprolol (TOPROL-XL) 25 MG 24 hr tablet     traZODone (DESYREL) 100 MG tablet     order for DME     order for DME     order for DME     polyethylene glycol (MIRALAX/GLYCOLAX) Packet     ACETAMINOPHEN PO     pregabalin (LYRICA) 75 MG capsule     cetirizine (ZYRTEC) 10 MG tablet     diclofenac (VOLTAREN) 1 % GEL topical gel     EPINEPHrine (EPIPEN JR) 0.15 MG/0.3ML injection     lisinopril (PRINIVIL/ZESTRIL) 10 MG tablet     risperiDONE (RISPERDAL) 0.5 MG tablet     ferrous sulfate (IRON) 325 (65 FE) MG tablet     order for DME     order for DME     order for DME     albuterol (2.5 MG/3ML) 0.083% neb solution     CYANOCOBALAMIN PO     Nutritional Supplements (RENÉE) PACK     fluticasone (FLONASE) 50 MCG/ACT nasal spray     ADVAIR DISKUS 250-50 MCG/DOSE diskus inhaler     calcium citrate-vitamin D (CITRACAL) 315-250 MG-UNIT TABS     hydrochlorothiazide (MICROZIDE) 12.5 MG capsule     estradiol (ESTRACE) 1 MG tablet     levETIRAcetam (KEPPRA) 500 MG tablet     aspirin EC 81 MG EC tablet     blood glucose monitoring (ONE TOUCH ULTRA 2) meter device kit     phenytoin 200 MG CAPS     dorzolamide (TRUSOPT) 2 % ophthalmic solution     zinc 50 MG TABS     nitroglycerin (NITROSTAT) 0.4 MG SL tablet     MEDICATION GIVEN BY INTRATHECAL PUMP - INSTRUCTION     latanoprost (XALATAN) 0.005 % ophthalmic solution     atorvastatin (LIPITOR) 40 MG tablet     order for DME     ORDER FOR DME     ORDER FOR DME     Cholecalciferol (VITAMIN D) 2000 UNITS tablet     Multiple Vitamin (MULTIVITAMIN OR)     Facility-Administered Medications Ordered in Other Encounters   Medication     neostigmine  (PROSTIGMINE) injection     glycopyrrolate (ROBINUL) injection     Past Medical History:   Diagnosis Date     Anemia      Antiplatelet or antithrombotic long-term use      Arthritis      AS (sickle cell trait) (H) 10/8/2013     Blind left eye      BPPV (benign paroxysmal positional vertigo)      Chronic back pain      COPD (chronic obstructive pulmonary disease) (H)      Coronary artery disease      CVA (cerebral infarction) 10/8/2012    x3, residual left sided weakness     Diastolic CHF (H) 9/4/2012     Dilantin toxicity 5/31/2013     Esophageal candidiasis (H)      GERD (gastroesophageal reflux disease)      Heart attack      Hernia, abdominal      History of blood transfusion     x5     History of thrombophlebitis      HTN (hypertension) 9/4/2012     Hyperlipidemia LDL goal <100 9/4/2012     Legally blind in right eye, as defined in [place]     No periphal vision right eye     Osteoporosis 1/21/2013     Other chronic pain      PAD (peripheral artery disease) (H)      Palpitations      Person who has had sex change operation 1/20/2014     Pneumonia      Schizoaffective disorder (H)      Seizure disorder (H) 9/4/2012     Sleep apnea     CPAP     Thrombosis of leg      Type 2 diabetes, HbA1C goal < 8% (H) 9/4/2012     Uncomplicated asthma      Unspecified cerebral artery occlusion with cerebral infarction        Past Surgical History:   Procedure Laterality Date     AMPUTATE LEG ABOVE KNEE Left 6/11/2016    Procedure: AMPUTATE LEG ABOVE KNEE;  Surgeon: Mello Rodriguez MD;  [facility]     AMPUTATE LEG BELOW KNEE Right 11/7/2016    Procedure: AMPUTATE LEG BELOW KNEE;  Surgeon: Savannah Durant MD;  [facility]     AMPUTATE REVISION STUMP LOWER EXTREMITY Right 11/11/2016    Procedure: AMPUTATE REVISION STUMP LOWER EXTREMITY;  Surgeon: Savannah Durant MD;  [facility]     AMPUTATE REVISION STUMP LOWER EXTREMITY Right 11/16/2016    Procedure: AMPUTATE REVISION STUMP LOWER EXTREMITY;  Surgeon: Savannah Durant MD;   [facility]     AMPUTATE TOE(S) Right 1/5/2016    Procedure: AMPUTATE TOE(S);  Surgeon: Mello Gaines DPM;  [facility]     ANGIOGRAM Bilateral 11/21/2014    Procedure: ANGIOGRAM;  Surgeon: Savannah Durant MD;  [facility]     ANGIOGRAM Left 1/16/2015    Procedure: ANGIOGRAM;  Surgeon: Savannah Durant MD;  [facility]     ANGIOGRAM Bilateral 9/14/2015    Procedure: ANGIOGRAM;  Surgeon: Savannah Durant MD;  [facility]     ANGIOGRAM Left 10/12/2015    Procedure: ANGIOGRAM;  Surgeon: Savannah Durant MD;  [facility]     ANGIOGRAM Right 6/6/2016    Procedure: ANGIOGRAM;  Surgeon: Savannah Durant MD;  [facility]     ANGIOPLASTY Right 6/6/2016    Procedure: ANGIOPLASTY;  Surgeon: Savannah Durant MD;  [facility]     APPENDECTOMY       BREAST SURGERY      right breast bx (benign)     CHOLECYSTECTOMY       COLONOSCOPY N/A 8/25/2014    Procedure: COLONOSCOPY;  Surgeon: Mello Ferrer MD;  [facility]     COLONOSCOPY WITH CO2 INSUFFLATION N/A 8/20/2014    Procedure: COLONOSCOPY WITH CO2 INSUFFLATION;  Surgeon: Duane, William Charles, MD;  Location[facility]     CYSTOSTOMY, INSERT TUBE SUPRAPUBIC, COMBINED N/A 1/16/2018    Procedure: COMBINED CYSTOSTOMY, INSERT TUBE SUPRAPUBIC;  Cystoscopy, Intraoperative Ultrasound, Suprapubic Tube Placement;  Surgeon: Keanu Dawson MD;  [facility]     ENDARTERECTOMY FEMORAL  5/23/2014    Procedure: ENDARTERECTOMY FEMORAL;  Surgeon: Jason Joshi MD;  [facility]     ESOPHAGOSCOPY, GASTROSCOPY, DUODENOSCOPY (EGD), COMBINED  12/14/2012    Procedure: COMBINED ESOPHAGOSCOPY, GASTROSCOPY, DUODENOSCOPY (EGD), BIOPSY SINGLE OR MULTIPLE;  ESOPHAGOSCOPY, GASTROSCOPY, DUODENOSCOPY (EGD), DILATATION ;  Surgeon: Elizabeth Stevenson MD;  Location:  GI     ESOPHAGOSCOPY, GASTROSCOPY, DUODENOSCOPY (EGD), COMBINED  12/31/2013    Procedure: COMBINED ESOPHAGOSCOPY, GASTROSCOPY, DUODENOSCOPY (EGD), BIOPSY SINGLE OR MULTIPLE;;  Surgeon: John  MD Clemente;  [facility]     ESOPHAGOSCOPY, GASTROSCOPY, DUODENOSCOPY (EGD), COMBINED  4/1/2014    Procedure: COMBINED ESOPHAGOSCOPY, GASTROSCOPY, DUODENOSCOPY (EGD);;  Surgeon: Clemente Lopez MD;  [facility]     ESOPHAGOSCOPY, GASTROSCOPY, DUODENOSCOPY (EGD), COMBINED  6/28/2014    Procedure: COMBINED ESOPHAGOSCOPY, GASTROSCOPY, DUODENOSCOPY (EGD);  Surgeon: Clemente Lopez MD;  [facility]     ESOPHAGOSCOPY, GASTROSCOPY, DUODENOSCOPY (EGD), COMBINED N/A 8/20/2014    Procedure: COMBINED ESOPHAGOSCOPY, GASTROSCOPY, DUODENOSCOPY (EGD), BIOPSY SINGLE OR MULTIPLE;  Surgeon: Duane, William Charles, MD;  Location:  OR     ESOPHAGOSCOPY, GASTROSCOPY, DUODENOSCOPY (EGD), COMBINED N/A 8/22/2014    Procedure: COMBINED ESOPHAGOSCOPY, GASTROSCOPY, DUODENOSCOPY (EGD), BIOPSY SINGLE OR MULTIPLE;  Surgeon: Mello Ferrer MD;  [facility]     ESOPHAGOSCOPY, GASTROSCOPY, DUODENOSCOPY (EGD), COMBINED N/A 10/2/2014    Procedure: COMBINED ESOPHAGOSCOPY, GASTROSCOPY, DUODENOSCOPY (EGD), BIOPSY SINGLE OR MULTIPLE;  Surgeon: Remy Haskins MD;  [facility]     ESOPHAGOSCOPY, GASTROSCOPY, DUODENOSCOPY (EGD), COMBINED Left 12/15/2014    Procedure: COMBINED ESOPHAGOSCOPY, GASTROSCOPY, DUODENOSCOPY (EGD), BIOPSY SINGLE OR MULTIPLE;  Surgeon: Remy Haskins MD;  [facility]     ESOPHAGOSCOPY, GASTROSCOPY, DUODENOSCOPY (EGD), COMBINED N/A 2/25/2015    Procedure: COMBINED ENDOSCOPIC ULTRASOUND, ESOPHAGOSCOPY, GASTROSCOPY, DUODENOSCOPY (EGD), FINE NEEDLE ASPIRATE/BIOPSY;  Surgeon: Clemente Lugo MD;  [facility]     ESOPHAGOSCOPY, GASTROSCOPY, DUODENOSCOPY (EGD), COMBINED Left 2/25/2015    Procedure: COMBINED ESOPHAGOSCOPY, GASTROSCOPY, DUODENOSCOPY (EGD), BIOPSY SINGLE OR MULTIPLE;  Surgeon: Clemente Lugo MD;  Location:  GI     ESOPHAGOSCOPY, GASTROSCOPY, DUODENOSCOPY (EGD), COMBINED N/A 9/25/2016    Procedure: COMBINED ESOPHAGOSCOPY, GASTROSCOPY, DUODENOSCOPY (EGD);  Surgeon:  Aziza Patiño MD;  [facility]     ESOPHAGOSCOPY, GASTROSCOPY, DUODENOSCOPY (EGD), COMBINED N/A 1/18/2017    Procedure: COMBINED ESOPHAGOSCOPY, GASTROSCOPY, DUODENOSCOPY (EGD), BIOPSY SINGLE OR MULTIPLE;  Surgeon: Clemente Lopez MD;  [facility]     ESOPHAGOSCOPY, GASTROSCOPY, DUODENOSCOPY (EGD), COMBINED N/A 11/26/2017    Procedure: COMBINED ESOPHAGOSCOPY, GASTROSCOPY, DUODENOSCOPY (EGD), REMOVE FOREIGN BODY;  Esophagogastroduodenoscopy with foreign body extraction  ;  Surgeon: Herberth Castrejon MD;  [facility]     ESOPHAGOSCOPY, GASTROSCOPY, DUODENOSCOPY (EGD), COMBINED N/A 11/26/2017    Procedure: COMBINED ESOPHAGOSCOPY, GASTROSCOPY, DUODENOSCOPY (EGD), REMOVE FOREIGN BODY;;  Surgeon: Herberth Castrejon MD;  [facility]     FASCIOTOMY LOWER EXTREMITY Left 6/10/2016    Procedure: FASCIOTOMY LOWER EXTREMITY;  Surgeon: Mello Rodriguez MD;  [facility]     HC CAPSULE ENDOSCOPY N/A 8/25/2014    Procedure: CAPSULE/PILL CAM ENDOSCOPY;  Surgeon: Reym Haskins MD;  [facility]     HC CAPSULE ENDOSCOPY N/A 10/2/2014    Procedure: CAPSULE/PILL CAM ENDOSCOPY;  Surgeon: Remy Haskins MD;  [facility]     ORTHOPEDIC SURGERY      broken wrist repair     SEX TRANSFORMATION SURGERY, MALE TO FEMALE      1974     SINUS SURGERY      cyst removed     TONSILLECTOMY       VASCULAR SURGERY      Left carotid stent       Family History   Problem Relation Age of Onset     Dementia Mother      Glaucoma Mother      DIABETES Mother      may have been type 1, childhood     Coronary Artery Disease Mother      MI     Glaucoma Father      DIABETES Father      may hev been type 1 - ??     Heart Failure Father      CANCER Maternal Aunt      leukemia     Schizophrenia Brother      Depression Brother      Suicide Sister      Depression Sister      DIABETES Sister      CANCER Maternal Aunt      ovarian     Glaucoma Maternal Grandmother      DIABETES Maternal Grandmother      Glaucoma Maternal  Grandfather      DIABETES Maternal Grandfather      Glaucoma Paternal Grandmother      DIABETES Paternal Grandmother      Glaucoma Paternal Grandfather      DIABETES Paternal Grandfather      Breast Cancer Sister      CEREBROVASCULAR DISEASE Brother      Colon Cancer No family hx of        Social History   Substance Use Topics     Smoking status: Former Smoker     Packs/day: 2.50     Years: 30.00     Types: Cigarettes, Cigars     Quit date: 11/1/2001     Smokeless tobacco: Never Used     Alcohol use No     Allergies   Allergen Reactions     Bee Venom      Penicillins Anaphylaxis     Other reaction(s): Skin Rash and/or Hives     Dilantin [Phenytoin] Other (See Comments)     Generic dilantin only per pt     Iodide Hives     09/11/15: Pt states she can use iodine and doesn't have any problems      Iodine-131      Novocaine [Procaine] Hives     Other reaction(s): Skin Rash and/or Hives     Tositumomab        I have reviewed the Medications, Allergies, Past Medical and Surgical History, and Social History in the Epic system.    Review of Systems   Constitutional: Negative for fever.   Respiratory: Negative for cough and shortness of breath.    Cardiovascular: Negative for chest pain.   Gastrointestinal: Positive for abdominal pain (Diffuse, lower), blood in stool (Black, tarry ×1) and vomiting (Coffee-ground appearance). Negative for diarrhea.   Neurological: Negative for light-headedness.   All other systems reviewed and are negative.      Physical Exam   BP: 154/80  Heart Rate: 66  Temp: 97.5  F (36.4  C)  Resp: 18  SpO2: 96 %      Physical Exam  General: patient is alert and oriented and in no acute distress   Head: atraumatic and normocephalic   EENT: moist mucus membranes without tonsillar erythema or exudates, sclera anicteric   neck: supple   Cardiovascular: regular rate and rhythm, extremities warm and well perfused  Pulmonary: lungs clear to auscultation bilaterally   Abdomen: soft, mild tenderness around  suprapubic catheter site otherwise no abdominal tenderness, no guarding  Musculoskeletal: normal range of motion, bilateral BKA  Neurological: alert and oriented, moving all extremities symmetrically  Skin: warm, dry     ED Course     ED Course     Procedures             EKG Interpretation:      Interpreted by Elizabeth Amezquita  Time reviewed: 0905  Symptoms at time of EKG: None   Rhythm: sinus bradycardia  Rate: Bradycardia  Axis: Normal  Ectopy: none  Conduction: normal  ST Segments/ T Waves: No acute ischemic changes  Q Waves: none  Comparison to prior: Unchanged    Clinical Impression: sinus bradycardia                Critical Care time:  none     Lactate is greater than 2 due to dehydration, at this time there is no sign of severe sepsis or septic shock.       Labs Ordered and Resulted from Time of ED Arrival Up to the Time of Departure from the ED - No data to display         Assessments & Plan (with Medical Decision Making)   Ms. Foote is a 68-year-old female with a history of type 2 diabetes mellitus, hypertension, hyperlipidemia, coronary artery disease status post stent placement, CVA, COPD, esophagitis and previous GI bleed who presents following an episode of tarry stools as well as coffee-ground emesis. Currently she is hemodynamically stable with the exception of moderate hypertension. She does appear somewhat confused and is diaphoretic. Her abdomen is soft and minimally tender except for around her suprapubic catheter. She is hemoccult negative on exam. Labs were obtained including CBC, CMP, INR, troponin.  Her hemoglobin is normal at 13.4.  INR is 1.04.  ALT is slightly elevated at 60, AST was hemolyzed and pending.  She did initially have an elevated lactate of 2.5.  She was given 1 L IV fluids and on recheck 1.3.  ECG shows sinus bradycardia without ischemic changes. She does have a history of life-threatening GI bleed requiring 6 units of PRBCs in the past however has had a number of episodes  of GI bleed which have been self-limited.  She does have a long history of esophagitis and suspect that this is most likely the etiology of her symptoms.  She was given IV Protonix. She does not have any history of hepatic disease.  Her abdominal exam is quite benign and at this point I have a low suspicion for bowel ischemia.  She does have a contrast allergy to iodine and given the low suspicion do not feel that CT of the abdomen is indicated at this time.  She will be admitted to the observation unit for serial exams, serial hemoglobin and GI consultation.    This part of the document was transcribed by Len Tenorio for Elizabeth Amezquita MD.    I have reviewed the nursing notes.    I have reviewed the findings, diagnosis, plan and need for follow up with the patient.    New Prescriptions    No medications on file       Final diagnoses:   Upper GI bleed     ILen, am serving as a trained medical scribe to document services personally performed by Elizabeth Amezquita MD, based on the provider's statements to me.      Elizabeth MONREAL MD, was physically present and have reviewed and verified the accuracy of this note documented by Len Tenorio.    2/8/2018   [facility], EMERGENCY DEPARTMENT     Elizabeth Amezquita MD  02/08/18 [id number]

## 2018-02-08 NOTE — ED NOTES
Pt arrives from nursing home for a complaint of black stools. PT refused IV start by EMS or blood sugar check. Vital signs stable. PT also told EMS that she had 10/10 all over pain.

## 2018-02-08 NOTE — PHARMACY-ADMISSION MEDICATION HISTORY
Admission medication history interview status for the 2/8/2018 admission is complete. See Epic admission navigator for allergy information, pharmacy, prior to admission medications and immunization status.     Medication history interview sources:  med list and nursing staff from [facility]    Changes made to PTA medication list (reason)  Deleted: Spiriva, albuterol HFA, Cosopt, Lyrica 150 mg dose - not on [place] med list.    Additional medication history information (including reliability of information, actions taken by pharmacist):  -Intrathecal pump medication reconciliation confirmed with [facility] in [place] [phone].   Current pump settings as of 2/8/18: Conc: Bupivacaine 20 mg/mL and Fentanyl 2000 mcg/mL, morphine 2 mg/mL  Continuous:  Fentanyl 796 mcg/day, Bupivacaine 7.96 mg/day, Morphine 0.796 mg/day   Boluses: Up to 7 boluses per 24-hr period of Bupivicaine 0.5994 mg and Fentanyl 59.9 mcg morphine 0.0599 mg.  Pump Refill Date 02/28/18    -Epipen not on med list; unknown if pt still has this; marked as unknown.    -Last flu vaccination 9/6/17    Prior to Admission medications    Medication Sig Last Dose Taking? Auth Provider   HYDROMORPHONE HCL PO Take 2 mg by mouth every 3 hours as needed for moderate to severe pain 2/7/2018 at Unknown time Yes Unknown, Entered By History   OMEPRAZOLE PO Take 20 mg by mouth daily 2/7/2018 at Unknown time Yes Unknown, Entered By History   acetaminophen-codeine (TYLENOL #3) 300-30 MG per tablet Take 1 tablet by mouth every 6 hours as needed for pain maximum 3 tablet(s) per day PRN at n/a Yes Allan Casey MD   escitalopram (LEXAPRO) 10 MG tablet Take 10 mg by mouth daily  2/7/2018 at Unknown time Yes Reported, Patient   traZODone (DESYREL) 50 MG tablet Take 150 mg by mouth At Bedtime  2/7/2018 at Unknown time Yes Reported, Patient   lactulose (CHRONULAC) 10 GM/15ML solution Take 20 g by mouth as needed for constipation 2/7/2018 at  Unknown time Yes Unknown, Entered By History   Prochlorperazine Maleate (COMPAZINE PO) Take 10 mg by mouth every 8 hours as needed for nausea 2/7/2018 at Unknown time Yes Unknown, Entered By History   ESTRACE VAGINAL 0.1 MG/GM cream USE DIME SIZE AMOUNT 3X A WEEK AT NIGHT 2/7/2018 at Unknown time Yes Elizabeth Oliver MD   pantoprazole (PROTONIX) 40 MG EC tablet Take 1 tablet (40 mg) by mouth 2 times daily  Patient taking differently: Take 40 mg by mouth daily  2/7/2018 at Unknown time Yes Leo Meyer APRN CNP   metFORMIN (GLUCOPHAGE-XR) 500 MG 24 hr tablet Take 1 tablet (500 mg) by mouth daily (with dinner) 2/7/2018 at Unknown time Yes Michelle Irizarry MD   brimonidine (ALPHAGAN) 0.2 % ophthalmic solution Place 1 drop into the right eye 3 times daily  Patient taking differently: Place 1 drop into the right eye 2 times daily  2/7/2018 at Unknown time Yes Marely Robin MD   sucralfate (CARAFATE) 1 GM tablet Take 1 tablet (1 g) by mouth 4 times daily May dissolve in 10 mL water is necessary. (Start upon completion of carafate suspension) 2/7/2018 at Unknown time Yes Allan Casey MD   umeclidinium (INCRUSE ELLIPTA) 62.5 MCG/INH oral inhaler Inhale 1 puff into the lungs daily 2/7/2018 at Unknown time Yes Unknown, Entered By History   ONDANSETRON PO Take 4 mg by mouth 3 times daily 2/7/2018 at Unknown time Yes Unknown, Entered By History   lidocaine (LIDODERM) 5 % Patch APPLY 1 TO 3 PATCHES TO PAINFUL AREA AT ONCE FOR UP TO 12 HOURS WITHIN A 24 HOUR PERIOD REMOVE AFTER 12 HOURS 2/7/2018 at Unknown time Yes Allan Casey MD   metoprolol (TOPROL-XL) 25 MG 24 hr tablet TAKE 1 TABLET (25 MG) BY MOUTH DAILY 2/7/2018 at Unknown time Yes Allan Casey MD   polyethylene glycol (MIRALAX/GLYCOLAX) Packet Take 17 g by mouth daily Dissolved in water or juice  2/7/2018 at Unknown time Yes Unknown, Entered By History   ACETAMINOPHEN PO Take 1,000 mg by mouth every 4 hours as needed for fever  Not to exceed 4000 mg/day 2/7/2018 at Unknown time Yes Unknown, Entered By History   pregabalin (LYRICA) 75 MG capsule Take 2 capsules (150 mg) by mouth 2 times daily 2/7/2018 at Unknown time Yes Allan Casey MD   cetirizine (ZYRTEC) 10 MG tablet Take 1 tablet (10 mg) by mouth daily as needed for allergies 2/7/2018 at Unknown time Yes Allan Casey MD   diclofenac (VOLTAREN) 1 % GEL topical gel Apply 2 g topically 4 times daily to hands 2/7/2018 at Unknown time Yes Allan Casey MD   lisinopril (PRINIVIL/ZESTRIL) 10 MG tablet Take 1 tablet (10 mg) by mouth daily 2/7/2018 at Unknown time Yes Bj Anderson MD   risperiDONE (RISPERDAL) 0.5 MG tablet Take 0.5 mg by mouth At Bedtime 2/7/2018 at Unknown time Yes Reported, Patient   ferrous sulfate (IRON) 325 (65 FE) MG tablet Take 1 tablet (325 mg) by mouth 2 times daily With meals 2/7/2018 at Unknown time Yes Allan Casey MD   albuterol (2.5 MG/3ML) 0.083% neb solution INHALE 1 VIAL VIA NEBULIZER EVERY 6 HOURS AS NEEDED PRN at Unknown time Yes Allan Casey MD   CYANOCOBALAMIN PO Take 2,000 mcg by mouth daily 2/7/2018 at Unknown time Yes Reported, Patient   fluticasone (FLONASE) 50 MCG/ACT nasal spray Spray 1 spray into both nostrils daily 2/7/2018 at Unknown time Yes Reported, Patient   ADVAIR DISKUS 250-50 MCG/DOSE diskus inhaler Inhale 1 puff into the lungs 2 times daily  2/7/2018 at Unknown time Yes Reported, Patient   calcium citrate-vitamin D (CITRACAL) 315-250 MG-UNIT TABS Take 2 tablets by mouth daily 2/7/2018 at Unknown time Yes Allan Casey MD   hydrochlorothiazide (MICROZIDE) 12.5 MG capsule Take 1 capsule (12.5 mg) by mouth daily  Patient taking differently: Take 12.5 mg by mouth daily Hold for sbp<90 2/7/2018 at Unknown time Yes Allan Casey MD   estradiol (ESTRACE) 1 MG tablet Take 1 tablet (1 mg) by mouth daily 2/7/2018 at Unknown time Yes Allan Casey MD   levETIRAcetam (KEPPRA) 500 MG tablet Take 1 tablet (500 mg) by  mouth 2 times daily 2/7/2018 at PM Yes Allan Casey MD   aspirin EC 81 MG EC tablet Take 1 tablet (81 mg) by mouth daily 2/7/2018 at Unknown time Yes Daria Mancilla MD   phenytoin 200 MG CAPS Take 200 mg by mouth 2 times daily  Patient taking differently: Take 200 mg by mouth 2 times daily (takes at 8 AM and 8 PM) 2/7/2018 at Unknown time Yes Noah Blum MD   dorzolamide (TRUSOPT) 2 % ophthalmic solution Place 1 drop into the right eye 2 times daily  2/7/2018 at Unknown time Yes Reported, Patient   zinc 50 MG TABS Take 1 tablet by mouth daily 2/7/2018 at Unknown time Yes Reported, Patient   nitroglycerin (NITROSTAT) 0.4 MG SL tablet Place 1 tablet (0.4 mg) under the tongue every 5 minutes as needed for chest pain if you are still having symptoms after 3 doses (15 minutes) call 911. PRN at n/a Yes Allan Casey MD   MEDICATION GIVEN BY INTRATHECAL PUMP - INSTRUCTION continuous January 4, 2018  - per [facility] in [place] [phone]:  Conc: Bupivacaine 20 mg/mL and Fentanyl 2000 mcg/mL, morphine 2 mg/mL  Continuous:  Fentanyl 796 mcg/day, Bupivacaine 7.96 mg/day, Morphine 0.796 mg/day   Boluses: Up to 7 boluses per 24-hr period of Bupivicaine 0.5994 mg and Fentanyl 59.9 mcg morphine 0.0599 mg    Pump Refill Date 02/28/18  Yes Unknown, Entered By History   latanoprost (XALATAN) 0.005 % ophthalmic solution Place 1 drop into both eyes At Bedtime 2/7/2018 at Unknown time Yes Kendrick Wells MD   atorvastatin (LIPITOR) 40 MG tablet Take 1 tablet (40 mg) by mouth daily  Patient taking differently: Take 40 mg by mouth At Bedtime  2/7/2018 at Unknown time Yes Allan Casey MD   Cholecalciferol (VITAMIN D) 2000 UNITS tablet Take 2,000 Units by mouth daily. 2/7/2018 at Unknown time Yes Reported, Patient   Multiple Vitamin (MULTIVITAMIN OR) Take 1 tablet by mouth daily  2/7/2018 at Unknown time Yes Reported, Patient   EPINEPHrine (EPIPEN JR) 0.15 MG/0.3ML  injection Inject 0.3 mLs (0.15 mg) into the muscle as needed for anaphylaxis Unknown at Unknown time  Allan Casey MD     Medication history completed by: Juliet Ruiz, AyshaD

## 2018-02-08 NOTE — ED NOTES
Bed: ED04  Expected date: 2/8/18  Expected time: 8:07 AM  Means of arrival: Ambulance  Comments:  [address]  67 y/o F  Lower GI bleed

## 2018-02-08 NOTE — PROGRESS NOTES
[facility]  Patient is currently open to home care services with [facility].  The patient is currently receiving RN and OT services.  [facility]  and team have been notified of patient admission.  [facility] liaison will continue to follow patient during stay.  If appropriate provide orders to resume home care at time of discharge.    Thank you  Keshia Schwab RN, BSN  [facility]ia[facility]  [phone]

## 2018-02-08 NOTE — H&P
"[facility] ED Observation Admission Note    Chief Complaint   Patient presents with     Melena       HPI:    Sonya Foote is a 67 year old transgender female with multiple comorbidities of CAD, s/p inferior MI with stents placed 2016, hypertension, hyperlipidemia, PVD, s/p bilateral AKA,  sickle cell trait, iron deficiency anemia, JOSE, COPD, asthma, GERD, dysphagia with history of esophageal stricture s/p multiple dilations, gastroparesis, NIDDM, neuropathy; chronic low back pain (intrathecal pump in place), DDD, BPPV, seizure disorder, h/o CVA x 3, carotid stenosis s/p stent to left carotid; depression/anxiety, schizoaffective disorder, and 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     She presented to the ED after one episode of black tarry stool the morning of 2/8. She had nausea and one emesis that appeared \"coffee ground\" on evening of 2/7. She subsequently called 911. She has had multiple episodes of GI bleed, most recently in 2014, and some have required blood transfusions. She has no chest pain, shortness of breath, lightheadedness. No fevers. Notes lower abdominal pain related to recent suprapubic catheter placement, but no upper abdominal pain. Last EGD 1/10/18 showed benign appearing esophageal stenosis that was dilated.     In the ED: vitals notable for hypertension, otherwise unremarkable. Stool hemo-occult was negative. Hemoglobin stable at 13.4. Overall labs appeared near baseline, though lactate elevated at 2.5. White count normal, no infectious symptoms. Given 1L IV fluids in ED along with home meds. Admitted to observation for serial hemoglobins, plan to discuss case with GI.    On admission to the observation unit, the patient reported persistent mild generalized abdominal pain. No vomiting or stools since presentation to ED. Agreeable to plan of care.    History:       Allergies   Allergen Reactions     Bee Venom      Penicillins Anaphylaxis     Other " reaction(s): Skin Rash and/or Hives     Dilantin [Phenytoin] Other (See Comments)     Generic dilantin only per pt     Iodide Hives     09/11/15: Pt states she can use iodine and doesn't have any problems      Iodine-131      Novocaine [Procaine] Hives     Other reaction(s): Skin Rash and/or Hives     Tositumomab        Past Medical History:   Diagnosis Date     Anemia      Antiplatelet or antithrombotic long-term use      Arthritis      AS (sickle cell trait) (H) 10/8/2013     Blind left eye      BPPV (benign paroxysmal positional vertigo)      Chronic back pain      COPD (chronic obstructive pulmonary disease) (H)      Coronary artery disease      CVA (cerebral infarction) 10/8/2012    x3, residual left sided weakness     Diastolic CHF (H) 9/4/2012     Dilantin toxicity 5/31/2013     Esophageal candidiasis (H)      GERD (gastroesophageal reflux disease)      Heart attack      Hernia, abdominal      History of blood transfusion     x5     History of thrombophlebitis      HTN (hypertension) 9/4/2012     Hyperlipidemia LDL goal <100 9/4/2012     Legally blind in right eye, as defined in [place]     No periphal vision right eye     Osteoporosis 1/21/2013     Other chronic pain      PAD (peripheral artery disease) (H)      Palpitations      Person who has had sex change operation 1/20/2014     Pneumonia      Schizoaffective disorder (H)      Seizure disorder (H) 9/4/2012     Sleep apnea     CPAP     Thrombosis of leg      Type 2 diabetes, HbA1C goal < 8% (H) 9/4/2012     Uncomplicated asthma      Unspecified cerebral artery occlusion with cerebral infarction        Past Surgical History:   Procedure Laterality Date     AMPUTATE LEG ABOVE KNEE Left 6/11/2016    Procedure: AMPUTATE LEG ABOVE KNEE;  Surgeon: Mello Rodriguez MD;  [facility]     AMPUTATE LEG BELOW KNEE Right 11/7/2016    Procedure: AMPUTATE LEG BELOW KNEE;  Surgeon: Savannah Durant MD;  [facility]     AMPUTATE REVISION STUMP LOWER EXTREMITY Right  11/11/2016    Procedure: AMPUTATE REVISION STUMP LOWER EXTREMITY;  Surgeon: Savannah Durant MD;  [facility]     AMPUTATE REVISION STUMP LOWER EXTREMITY Right 11/16/2016    Procedure: AMPUTATE REVISION STUMP LOWER EXTREMITY;  Surgeon: Savannah Durant MD;  [facility]     AMPUTATE TOE(S) Right 1/5/2016    Procedure: AMPUTATE TOE(S);  Surgeon: Mello Gaines DPM;  Location: [facility]     ANGIOGRAM Bilateral 11/21/2014    Procedure: ANGIOGRAM;  Surgeon: Savannah Durant MD;  [facility]     ANGIOGRAM Left 1/16/2015    Procedure: ANGIOGRAM;  Surgeon: Savannah Durant MD;  [facility]     ANGIOGRAM Bilateral 9/14/2015    Procedure: ANGIOGRAM;  Surgeon: Savannah Durant MD;  [facility]     ANGIOGRAM Left 10/12/2015    Procedure: ANGIOGRAM;  Surgeon: Savannah Durant MD;  [facility]     ANGIOGRAM Right 6/6/2016    Procedure: ANGIOGRAM;  Surgeon: Savannah Durant MD;  [facility]     ANGIOPLASTY Right 6/6/2016    Procedure: ANGIOPLASTY;  Surgeon: Savannah Durant MD;  [facility]     APPENDECTOMY       BREAST SURGERY      right breast bx (benign)     CHOLECYSTECTOMY       COLONOSCOPY N/A 8/25/2014    Procedure: COLONOSCOPY;  Surgeon: Mello Ferrer MD;  [facility]     COLONOSCOPY WITH CO2 INSUFFLATION N/A 8/20/2014    Procedure: COLONOSCOPY WITH CO2 INSUFFLATION;  Surgeon: Duane, William Charles, MD;  Location: [facility]     CYSTOSTOMY, INSERT TUBE SUPRAPUBIC, COMBINED N/A 1/16/2018    Procedure: COMBINED CYSTOSTOMY, INSERT TUBE SUPRAPUBIC;  Cystoscopy, Intraoperative Ultrasound, Suprapubic Tube Placement;  Surgeon: Keanu Dawson MD;  [facility]     ENDARTERECTOMY FEMORAL  5/23/2014    Procedure: ENDARTERECTOMY FEMORAL;  Surgeon: Jason Joshi MD;  [facility]     ESOPHAGOSCOPY, GASTROSCOPY, DUODENOSCOPY (EGD), COMBINED  12/14/2012    Procedure: COMBINED ESOPHAGOSCOPY, GASTROSCOPY, DUODENOSCOPY (EGD), BIOPSY SINGLE OR MULTIPLE;  ESOPHAGOSCOPY, GASTROSCOPY, DUODENOSCOPY (EGD),  DILATATION ;  Surgeon: Elizabeth Stevenson MD;  Location:  GI     ESOPHAGOSCOPY, GASTROSCOPY, DUODENOSCOPY (EGD), COMBINED  12/31/2013    Procedure: COMBINED ESOPHAGOSCOPY, GASTROSCOPY, DUODENOSCOPY (EGD), BIOPSY SINGLE OR MULTIPLE;;  Surgeon: Clemente Lopez MD;  Location:  GI     ESOPHAGOSCOPY, GASTROSCOPY, DUODENOSCOPY (EGD), COMBINED  4/1/2014    Procedure: COMBINED ESOPHAGOSCOPY, GASTROSCOPY, DUODENOSCOPY (EGD);;  Surgeon: Clemente Lopez MD;  Location:  GI     ESOPHAGOSCOPY, GASTROSCOPY, DUODENOSCOPY (EGD), COMBINED  6/28/2014    Procedure: COMBINED ESOPHAGOSCOPY, GASTROSCOPY, DUODENOSCOPY (EGD);  Surgeon: Clemente Lopez MD;  Location:  GI     ESOPHAGOSCOPY, GASTROSCOPY, DUODENOSCOPY (EGD), COMBINED N/A 8/20/2014    Procedure: COMBINED ESOPHAGOSCOPY, GASTROSCOPY, DUODENOSCOPY (EGD), BIOPSY SINGLE OR MULTIPLE;  Surgeon: Duane, William Charles, MD;  Location: [facility]     ESOPHAGOSCOPY, GASTROSCOPY, DUODENOSCOPY (EGD), COMBINED N/A 8/22/2014    Procedure: COMBINED ESOPHAGOSCOPY, GASTROSCOPY, DUODENOSCOPY (EGD), BIOPSY SINGLE OR MULTIPLE;  Surgeon: Mello Ferrer MD;  Location:  GI     ESOPHAGOSCOPY, GASTROSCOPY, DUODENOSCOPY (EGD), COMBINED N/A 10/2/2014    Procedure: COMBINED ESOPHAGOSCOPY, GASTROSCOPY, DUODENOSCOPY (EGD), BIOPSY SINGLE OR MULTIPLE;  Surgeon: Remy Haskins MD;  Location:  GI     ESOPHAGOSCOPY, GASTROSCOPY, DUODENOSCOPY (EGD), COMBINED Left 12/15/2014    Procedure: COMBINED ESOPHAGOSCOPY, GASTROSCOPY, DUODENOSCOPY (EGD), BIOPSY SINGLE OR MULTIPLE;  Surgeon: Remy Haskins MD;  Location:  [facility]     ESOPHAGOSCOPY, GASTROSCOPY, DUODENOSCOPY (EGD), COMBINED N/A 2/25/2015    Procedure: COMBINED ENDOSCOPIC ULTRASOUND, ESOPHAGOSCOPY, GASTROSCOPY, DUODENOSCOPY (EGD), FINE NEEDLE ASPIRATE/BIOPSY;  Surgeon: Clemente Lugo MD;  Location:  GI     ESOPHAGOSCOPY, GASTROSCOPY, DUODENOSCOPY (EGD), COMBINED Left 2/25/2015    Procedure: COMBINED ESOPHAGOSCOPY, GASTROSCOPY,  DUODENOSCOPY (EGD), BIOPSY SINGLE OR MULTIPLE;  Surgeon: Clemente Lugo MD;  [facility]     ESOPHAGOSCOPY, GASTROSCOPY, DUODENOSCOPY (EGD), COMBINED N/A 9/25/2016    Procedure: COMBINED ESOPHAGOSCOPY, GASTROSCOPY, DUODENOSCOPY (EGD);  Surgeon: Aziza Patiño MD;  [facility]     ESOPHAGOSCOPY, GASTROSCOPY, DUODENOSCOPY (EGD), COMBINED N/A 1/18/2017    Procedure: COMBINED ESOPHAGOSCOPY, GASTROSCOPY, DUODENOSCOPY (EGD), BIOPSY SINGLE OR MULTIPLE;  Surgeon: Clemente Lopez MD;  [facility]     ESOPHAGOSCOPY, GASTROSCOPY, DUODENOSCOPY (EGD), COMBINED N/A 11/26/2017    Procedure: COMBINED ESOPHAGOSCOPY, GASTROSCOPY, DUODENOSCOPY (EGD), REMOVE FOREIGN BODY;  Esophagogastroduodenoscopy with foreign body extraction  ;  Surgeon: Herberth Castrejon MD;  [facility]     ESOPHAGOSCOPY, GASTROSCOPY, DUODENOSCOPY (EGD), COMBINED N/A 11/26/2017    Procedure: COMBINED ESOPHAGOSCOPY, GASTROSCOPY, DUODENOSCOPY (EGD), REMOVE FOREIGN BODY;;  Surgeon: Herberth Castrejon MD;  [facility]     FASCIOTOMY LOWER EXTREMITY Left 6/10/2016    Procedure: FASCIOTOMY LOWER EXTREMITY;  Surgeon: Mello Rodriguez MD;  [facility]     HC CAPSULE ENDOSCOPY N/A 8/25/2014    Procedure: CAPSULE/PILL CAM ENDOSCOPY;  Surgeon: Remy Haskins MD;  [facility]     HC CAPSULE ENDOSCOPY N/A 10/2/2014    Procedure: CAPSULE/PILL CAM ENDOSCOPY;  Surgeon: Remy Haskins MD;  [facility]     ORTHOPEDIC SURGERY      broken wrist repair     SEX TRANSFORMATION SURGERY, MALE TO FEMALE      1974     SINUS SURGERY      cyst removed     TONSILLECTOMY       VASCULAR SURGERY      Left carotid stent       Family History   Problem Relation Age of Onset     Dementia Mother      Glaucoma Mother      DIABETES Mother      may have been type 1, childhood     Coronary Artery Disease Mother      MI     Glaucoma Father      DIABETES Father      may hev been type 1 - ??     Heart Failure Father      CANCER Maternal Aunt       leukemia     Schizophrenia Brother      Depression Brother      Suicide Sister      Depression Sister      DIABETES Sister      CANCER Maternal Aunt      ovarian     Glaucoma Maternal Grandmother      DIABETES Maternal Grandmother      Glaucoma Maternal Grandfather      DIABETES Maternal Grandfather      Glaucoma Paternal Grandmother      DIABETES Paternal Grandmother      Glaucoma Paternal Grandfather      DIABETES Paternal Grandfather      Breast Cancer Sister      CEREBROVASCULAR DISEASE Brother      Colon Cancer No family hx of        Social History     Social History     Marital status:      Spouse name: N/A     Number of children: 2     Years of education: N/A     Occupational History     retired      retired     Social History Main Topics     Smoking status: Former Smoker     Packs/day: 2.50     Years: 30.00     Types: Cigarettes, Cigars     Quit date: 11/1/2001     Smokeless tobacco: Never Used     Alcohol use No     Drug use: No     Sexual activity: Not Currently     Other Topics Concern     Caffeine Concern No     1 in the morning     Social History Narrative      Her father was in the  and she moved around a lot when she was a kid, and has lived abroad including in [facility] and the [facility].  She was previously employed as a mental health worker. Moved from [place] to [place] in 2012. She is currently living in assisted living ([facility] in [facility]).  Wife passed away 6/2017.            She has 2 adopted children (Kam and Prashanth)         Current Facility-Administered Medications on File Prior to Encounter:  neostigmine (PROSTIGMINE) injection   glycopyrrolate (ROBINUL) injection     Current Outpatient Prescriptions on File Prior to Encounter:  acetaminophen-codeine (TYLENOL #3) 300-30 MG per tablet Take 1 tablet by mouth every 6 hours as needed for pain maximum 3 tablet(s) per day   escitalopram (LEXAPRO) 10 MG tablet    dorzolamide-timolol (COSOPT) 2-0.5 % ophthalmic  solution    LYRICA 150 MG capsule    SPIRIVA HANDIHALER 18 MCG capsule    traZODone (DESYREL) 50 MG tablet    lactulose (CHRONULAC) 10 GM/15ML solution Take 20 g by mouth as needed for constipation   Prochlorperazine Maleate (COMPAZINE PO) Take 10 mg by mouth every 8 hours as needed for nausea   ESTRACE VAGINAL 0.1 MG/GM cream USE DIME SIZE AMOUNT 3X A WEEK AT NIGHT   pantoprazole (PROTONIX) 40 MG EC tablet Take 1 tablet (40 mg) by mouth 2 times daily (Patient taking differently: Take 40 mg by mouth daily )   metFORMIN (GLUCOPHAGE-XR) 500 MG 24 hr tablet Take 1 tablet (500 mg) by mouth daily (with dinner)   brimonidine (ALPHAGAN) 0.2 % ophthalmic solution Place 1 drop into the right eye 3 times daily (Patient taking differently: Place 1 drop into the right eye 2 times daily )   sucralfate (CARAFATE) 1 GM tablet Take 1 tablet (1 g) by mouth 4 times daily May dissolve in 10 mL water is necessary. (Start upon completion of carafate suspension)   albuterol (PROAIR HFA/PROVENTIL HFA/VENTOLIN HFA) 108 (90 BASE) MCG/ACT Inhaler Inhale 2 puffs into the lungs every 6 hours as needed for shortness of breath / dyspnea or wheezing   umeclidinium (INCRUSE ELLIPTA) 62.5 MCG/INH oral inhaler Inhale 1 puff into the lungs daily   ONDANSETRON PO Take 4 mg by mouth 3 times daily   lidocaine (LIDODERM) 5 % Patch APPLY 1 TO 3 PATCHES TO PAINFUL AREA AT ONCE FOR UP TO 12 HOURS WITHIN A 24 HOUR PERIOD REMOVE AFTER 12 HOURS   blood glucose monitoring (ONE TOUCH ULTRASOFT) lancets Use to test blood sugar 3 times daily or as directed.   blood glucose monitoring (ONE TOUCH ULTRA) test strip Use to test blood sugars 3 times daily or as directed.   metoprolol (TOPROL-XL) 25 MG 24 hr tablet TAKE 1 TABLET (25 MG) BY MOUTH DAILY   traZODone (DESYREL) 100 MG tablet Take 1.5 tablets (150 mg) by mouth At Bedtime   order for DME Full electric hospital bed with half railsDx: [id number], I110, H371Hwdlao of need: lifetime   order for DME Wheel Chair  Cushion: 18 x 18 inch Roho cushion   order for DME Hospital bed with side rails   polyethylene glycol (MIRALAX/GLYCOLAX) Packet Take 17 g by mouth daily Dissolved in water or juice    ACETAMINOPHEN PO Take 1,000 mg by mouth every 4 hours as needed for fever Not to exceed 4000 mg/day   pregabalin (LYRICA) 75 MG capsule Take 2 capsules (150 mg) by mouth 2 times daily   cetirizine (ZYRTEC) 10 MG tablet Take 1 tablet (10 mg) by mouth daily as needed for allergies   diclofenac (VOLTAREN) 1 % GEL topical gel Apply 2 g topically 4 times daily to hands   EPINEPHrine (EPIPEN JR) 0.15 MG/0.3ML injection Inject 0.3 mLs (0.15 mg) into the muscle as needed for anaphylaxis   lisinopril (PRINIVIL/ZESTRIL) 10 MG tablet Take 1 tablet (10 mg) by mouth daily   risperiDONE (RISPERDAL) 0.5 MG tablet Take 0.5 mg by mouth At Bedtime   ferrous sulfate (IRON) 325 (65 FE) MG tablet Take 1 tablet (325 mg) by mouth 2 times daily With meals   order for DME Equipment being ordered: CPAP supplies mask, hose, filters, cushionfax [facility] at [phone]   order for DME Equipment being ordered: CPAP supplies mask, hose, filters, cushion fax to [facility] at [phone]: 10 Device Refills: prn Class: Local Print Start: 2/10/2017   order for DME Equipment being ordered: Nebulizer and tubing supplies   albuterol (2.5 MG/3ML) 0.083% neb solution INHALE 1 VIAL VIA NEBULIZER EVERY 6 HOURS AS NEEDED   CYANOCOBALAMIN PO Take 2,000 mcg by mouth daily   Nutritional Supplements (RENÉE) PACK Take 1 packet by mouth 2 times daily   fluticasone (FLONASE) 50 MCG/ACT nasal spray Spray 1 spray into both nostrils daily   ADVAIR DISKUS 250-50 MCG/DOSE diskus inhaler Inhale 1 puff into the lungs 2 times daily    calcium citrate-vitamin D (CITRACAL) 315-250 MG-UNIT TABS Take 2 tablets by mouth daily   hydrochlorothiazide (MICROZIDE) 12.5 MG capsule Take 1 capsule (12.5 mg) by mouth daily (Patient taking differently: Take 12.5 mg by mouth daily Hold for  sbp<90)   estradiol (ESTRACE) 1 MG tablet Take 1 tablet (1 mg) by mouth daily   levETIRAcetam (KEPPRA) 500 MG tablet Take 1 tablet (500 mg) by mouth 2 times daily   aspirin EC 81 MG EC tablet Take 1 tablet (81 mg) by mouth daily   blood glucose monitoring (ONE TOUCH ULTRA 2) meter device kit Use to test blood sugars 3 times daily or as directed.   phenytoin 200 MG CAPS Take 200 mg by mouth 2 times daily (Patient taking differently: Take 200 mg by mouth 2 times daily (takes at 8 AM and 8 PM))   dorzolamide (TRUSOPT) 2 % ophthalmic solution Place 1 drop into the right eye 2 times daily    zinc 50 MG TABS Take 1 tablet by mouth daily   nitroglycerin (NITROSTAT) 0.4 MG SL tablet Place 1 tablet (0.4 mg) under the tongue every 5 minutes as needed for chest pain if you are still having symptoms after 3 doses (15 minutes) call 911.   MEDICATION GIVEN BY INTRATHECAL PUMP - INSTRUCTION continuous January 4, 2018  - per [facility] [place] [phone]:Conc: Bupivacaine 20 mg/mL and Fentanyl 2000 mcg/mL, morphine 2 mg/mLContinuous:  Fentanyl 795.9 mcg/day, Bupivacaine 7.759 mg/day, Morphine 0.7959 mg/day Boluses: Up to 7 boluses per 24-hr period of Bupivicaine 0.599 mg and Fentanyl 59.9 mcg morphine 0.0599 mgPump Refill Date at Max Activations: 3/1/18   latanoprost (XALATAN) 0.005 % ophthalmic solution Place 1 drop into both eyes At Bedtime   atorvastatin (LIPITOR) 40 MG tablet Take 1 tablet (40 mg) by mouth daily (Patient taking differently: Take 40 mg by mouth At Bedtime )   order for DME Equipment being ordered: Glucerna daily shakes with each meal   ORDER FOR DME Equipment being ordered: Power Wheelchair   ORDER FOR DME Equipment being ordered: Depends briefs   Cholecalciferol (VITAMIN D) 2000 UNITS tablet Take 2,000 Units by mouth daily.   Multiple Vitamin (MULTIVITAMIN OR) Take 1 tablet by mouth daily        Data:    Results for orders placed or performed during the hospital encounter of  02/08/18   XR Chest Port 1 View    Narrative    XR CHEST PORT 1 VW  2/8/2018 9:35 AM      HISTORY: shortness of breath, cough;     COMPARISON: 5/31/2017    FINDINGS: Portable AP view of the chest. Mild pulmonary vascular  engorgement. No pneumothorax or substantial pleural fluid. Heart size  is normal.      Impression    IMPRESSION: Mild pulmonary vascular engorgement.   CBC with platelets differential   Result Value Ref Range    WBC 8.0 4.0 - 11.0 10e9/L    RBC Count 4.80 3.8 - 5.2 10e12/L    Hemoglobin 13.4 11.7 - 15.7 g/dL    Hematocrit 39.9 35.0 - 47.0 %    MCV 83 78 - 100 fl    MCH 27.9 26.5 - 33.0 pg    MCHC 33.6 31.5 - 36.5 g/dL    RDW 16.5 (H) 10.0 - 15.0 %    Platelet Count 256 150 - 450 10e9/L    Diff Method Automated Method     % Neutrophils 73.0 %    % Lymphocytes 14.8 %    % Monocytes 9.9 %    % Eosinophils 1.9 %    % Basophils 0.1 %    % Immature Granulocytes 0.3 %    Nucleated RBCs 0 0 /100    Absolute Neutrophil 5.8 1.6 - 8.3 10e9/L    Absolute Lymphocytes 1.2 0.8 - 5.3 10e9/L    Absolute Monocytes 0.8 0.0 - 1.3 10e9/L    Absolute Eosinophils 0.2 0.0 - 0.7 10e9/L    Absolute Basophils 0.0 0.0 - 0.2 10e9/L    Abs Immature Granulocytes 0.0 0 - 0.4 10e9/L    Absolute Nucleated RBC 0.0    Basic metabolic panel   Result Value Ref Range    Sodium 139 133 - 144 mmol/L    Potassium 4.0 3.4 - 5.3 mmol/L    Chloride 105 94 - 109 mmol/L    Carbon Dioxide 25 20 - 32 mmol/L    Anion Gap 10 3 - 14 mmol/L    Glucose 95 70 - 99 mg/dL    Urea Nitrogen 8 7 - 30 mg/dL    Creatinine 0.43 (L) 0.52 - 1.04 mg/dL    GFR Estimate >90 >60 mL/min/1.7m2    GFR Estimate If Black >90 >60 mL/min/1.7m2    Calcium 9.5 8.5 - 10.1 mg/dL   Hepatic panel   Result Value Ref Range    Bilirubin Direct <0.1 0.0 - 0.2 mg/dL    Bilirubin Total 0.2 0.2 - 1.3 mg/dL    Albumin 2.9 (L) 3.4 - 5.0 g/dL    Protein Total 8.8 6.8 - 8.8 g/dL    Alkaline Phosphatase 250 (H) 40 - 150 U/L    ALT 60 (H) 0 - 50 U/L    AST Unsatisfactory specimen - hemolyzed  "0 - 45 U/L   INR   Result Value Ref Range    INR 1.04 0.86 - 1.14   Troponin I   Result Value Ref Range    Troponin I ES <0.015 0.000 - 0.045 ug/L   EKG 12-lead, tracing only   Result Value Ref Range    Interpretation ECG Click View Image link to view waveform and result    ISTAT gases lactate rubina POCT   Result Value Ref Range    Ph Venous 7.52 (H) 7.32 - 7.43 pH    PCO2 Venous 32 (L) 40 - 50 mm Hg    PO2 Venous 39 25 - 47 mm Hg    Bicarbonate Venous 27 21 - 28 mmol/L    O2 Sat Venous 80 %    Lactic Acid 2.5 (H) 0.7 - 2.1 mmol/L   ABO/Rh type and screen   Result Value Ref Range    ABO PENDING     Antibody Screen PENDING     Test Valid Only At          [facility]    Specimen Expires 02/11/2018      *Note: Due to a large number of results and/or encounters for the requested time period, some results have not been displayed. A complete set of results can be found in Results Review.      ROS:    10 point ROS negative other than the symptoms noted above.      Exam:    Exam:  Constitutional: alert and no distress  Head: Normocephalic.   Neck: Neck supple.   ENT: teeth absent  Cardiovascular: No lifts, heaves, or thrills. RRR. No murmurs, clicks gallops or rub  Respiratory: Good diaphragmatic excursion. Lungs clear  Gastrointestinal: Abdomen soft, mild generalized tenderness. BS normal. No masses, organomegaly. Suprapubic catheter in place, C/D/I  Musculoskeletal: bilateral above knee amputations  Skin: no suspicious lesions or rashes  Neurologic: Alert and oriented.   Psychiatric: mentation appears normal and affect normal/bright    BP (!) 179/92  Temp 97.8  F (36.6  C)  Resp 13  SpO2 99%      Assessment/Plan:    1. Black stools: patient presented to ED 2/8/18 after one stool that morning that looked \"tarry black.\" Reports nausea with one emesis the night previously that was \"coffee ground.\" history of multiple episodes of GI bleeding, some stable, one with life threatening " bleed requiring multiple transfusions. Last in 2014. On ASA. Hemo-occult negative in ED, stable hemoglobin of 13.4. Elevated lactate in ED of 2.5, after 1L IVF improved to 1.3. No evidence of sepsis. EGD from 1/10/18 with esophageal stenosis that was dilated, otherwise no acute findings. Of note, she is on iron supplements which may cause black stools.  - Admit to observation  - Continue protonix 40 mg daily before meals  - Continue home carafate  - Serial Hgb/Hct q6h  - Discuss case with GI team if persistent bloody stools, decrease in hemoglobin  - Fecal hemo-occult/gastic occult screening if emesis/stool while in observation  - Zofran, compazine prn for nausea    2. Hx of vascular disease to include CAD s/p stent, carotid stenosis s/p stent, CVA x3, PAD s/p bilateral AKA: Vascular symptoms stable.  - Continue home atorvastatin  - Hold ASA due to possible GI bleed    3. Hypertension: BP elevated in ED in 140s-190s systolic. Given home antihypertensives in ED.  - Continue home lisinopril, hydrochlorothiazide, metoprolol    4. Type 2 DM: BS 95 in ED.  - Continue home metformin  - Glucose checks before meals    5. Hx depression, schizoaffective disorder:  - Continue home lyrica, lexapro, trazodone, risperdal    6. Hx seizure disorder:  - Continue home keppra, phenytoin    7. Hx COPD, asthma, JOSE:  - Albuterol nebs as needed  - O2 as needed     8. Suprapubic catheter: Recently placed due to functional incontinence. UA with moderate blood, no evidence for infection.  - Catheter cares  - UA prn if urine appears cloudy, foul smelling, etc.    9. Transgender:  Male to female.  - Continue home estrogen therapy      Signed:  Cindy Ledesma PA-C  February 8, 2018 at 10:25 AM

## 2018-02-08 NOTE — PLAN OF CARE
Problem: Patient Care Overview  Goal: Plan of Care/Patient Progress Review  Outpatient/Observation goals to be met before discharge home:  Comments: -diagnostic tests and consults completed and resulted no  -vital signs normal or at patient baseline yes  -adequate pain control on oral analgesics yes  -returns to baseline functional status yes  -safe disposition plan has been identified no   Nurse to notify provider when observation goals have been met and patient is ready for discharge.

## 2018-02-08 NOTE — IP AVS SNAPSHOT
"                  MRN:[id number]                      After Visit Summary   2/8/2018    Sonya Foote    MRN: [id number]           Thank you!     Thank you for choosing [facility] for your care. Our goal is always to provide you with excellent care. Hearing back from our patients is one way we can continue to improve our services. Please take a few minutes to complete the written survey that you may receive in the mail after you visit with us. Thank you!        Patient Information     Date Of Birth          1950        About your hospital stay     You were admitted on:  February 8, 2018 You last received care in the:  Unit 6D Observation [facility]    You were discharged on:  February 9, 2018        Reason for your hospital stay       You were admitted for workup of concerning \"tarry black\" stool and nausea with one emesis that was \"coffee ground.\"                  Who to Call     For medical emergencies, please call 911.  For non-urgent questions about your medical care, please call your primary care provider or clinic, [phone]          Attending Provider     Provider Specialty    Elizabeth Amezquita MD Emergency Medicine    [facility], Nevin Mehta MD Emergency Medicine       Primary Care Provider Office Phone # Fax #    Allan Casey [phone]       When to contact your care team       Call your primary care provider, the [facility] ([phone]), or go to the nearest emergency room for temperature >100.5, uncontrolled nausea/vomiting/diarrhea/constipation, recurrent black or bloody stools or vomit, unrelieved pain, bleeding not relieved with pressure, dizziness, chest pain, shortness of breath, loss of consciousness, and any new or concerning symptoms.                  After Care Instructions     Activity       Your activity upon discharge: Activity as tolerated or as you were doing prior to admission with assistance as needed.            Diet       Follow this diet upon " discharge: Continue mechanical/dental soft diet as tolerated. Be sure to drink plenty of non-caffeinated beverages. Supplement your diet with high-calorie snacks or nutritional supplements (e.g. Boost, Ensure, Resource Breeze) if needed to ensure adequate calorie intake.                  Follow-up Appointments     Follow Up and recommended labs and tests       Follow up with your primary care provider as needed at your discretion or as previously scheduled.                  Your next 10 appointments already scheduled     Feb 19, 2018  9:30 AM CST   (Arrive by 9:15 AM)   Return Visit with Alina Jennings MD   [facility] ([facility])    [address]  [address]  [place] [address]   [phone]            Mar 06, 2018  9:15 AM CST   VISUAL FIELD with [facility] EYE VISUAL FIELD   [facility] ([facility])    [place] [address]  [place] [facility] [address]   [phone]            Mar 06, 2018  9:45 AM CST   RETURN GLAUCOMA with Marely Robin MD   [facility] ([facility])    [address]  [facility] [address]   [phone]            May 11, 2018 10:50 AM CDT   (Arrive by 10:35 AM)   RETURN DIABETES with Michelle Irizarry MD   [facility] Endocrinology ([facility])    [address]  3rd Floor  [place] [address]   [phone]              Pending Results     Date and Time Order Name Status Description    2/9/2018 0938 Blood culture In process     2/9/2018 0938 Blood culture In process     2/9/2018 0444 Urine Culture Aerobic Bacterial Preliminary             Statement of Approval     Ordered          02/09/18 1230  I have reviewed and agree with all the recommendations and orders detailed in this document.  EFFECTIVE NOW     Approved and electronically signed by:  Deanne Wilcox  "APRN CNP             Admission Information     Date & Time Provider Department Dept. Phone    2018 Nevin Wagner MD Unit 6D Observation [facility] [place] [phone]      Your Vitals Were     Blood Pressure Pulse Temperature Respirations Pulse Oximetry       121/65 (BP Location: Left arm) 91 99.8  F (37.7  C) (Oral) 18 93%       MyChart Information     BRAINDIGITt lets you send messages to your doctor, view your test results, renew your prescriptions, schedule appointments and more. To sign up, go to [email or web address] . Click on \"Log in\" on the left side of the screen, which will take you to the Welcome page. Then click on \"Sign up Now\" on the right side of the page.     You will be asked to enter the access code listed below, as well as some personal information. Please follow the directions to create your username and password.     Your access code is: [email or web address]  Expires: 2018  9:57 AM     Your access code will  in 90 days. If you need help or a new code, please call your [place] clinic or [phone].        Care EveryWhere ID     This is your Care EveryWhere ID. This could be used by other organizations to access your [place] medical records  [id number]        Equal Access to Services     KARI CARREON : Hadii shaheen youo Sostephali, waaxda luqadaha, qaybta kaalmada adeegyada, tonie marvin . So [facility] [phone].    ATENCIÓN: Si habla español, tiene a briones disposición servicios gratuitos de asistencia lingüística. Llame al [phone].    We comply with applicable federal civil rights laws and [place] laws. We do not discriminate on the basis of race, color, national origin, age, disability, sex, sexual orientation, or gender identity.               Review of your medicines      START taking        Dose / Directions    benzocaine-menthol 15-3.6 MG lozenge   Commonly known as:  CEPACOL   Used for:  Throat pain        Dose:  1 lozenge   Place 1 lozenge " inside cheek every hour as needed for sore throat   Quantity:  100 lozenge   Refills:  0         CONTINUE these medicines which may have CHANGED, or have new prescriptions. If we are uncertain of the size of tablets/capsules you have at home, strength may be listed as something that might have changed.        Dose / Directions    atorvastatin 40 MG tablet   Commonly known as:  LIPITOR   This may have changed:  when to take this   Used for:  Hyperlipidemia LDL goal <100        Dose:  40 mg   Take 1 tablet (40 mg) by mouth daily   Quantity:  90 tablet   Refills:  3       hydrochlorothiazide 12.5 MG capsule   Commonly known as:  MICROZIDE   This may have changed:  additional instructions   Used for:  Essential hypertension with goal blood pressure less than 130/80        Dose:  12.5 mg   Take 1 capsule (12.5 mg) by mouth daily   Quantity:  90 capsule   Refills:  3       ondansetron 8 MG ODT tab   Commonly known as:  ZOFRAN-ODT   This may have changed:    - medication strength  - how much to take  - when to take this  - reasons to take this   Used for:  Non-intractable vomiting with nausea, unspecified vomiting type        Dose:  8 mg   Take 1 tablet (8 mg) by mouth every 8 hours as needed for nausea or vomiting   Quantity:  30 tablet   Refills:  2       Phenytoin Sodium Extended 200 MG Caps   This may have changed:  additional instructions   Used for:  Seizure disorder (H)        Dose:  200 mg   Take 200 mg by mouth 2 times daily   Quantity:  60 capsule   Refills:  0         CONTINUE these medicines which have NOT CHANGED        Dose / Directions    ACETAMINOPHEN PO        Dose:  1000 mg   Take 1,000 mg by mouth every 4 hours as needed for fever Not to exceed 4000 mg/day   Refills:  0       ADVAIR DISKUS 250-50 MCG/DOSE diskus inhaler   Generic drug:  fluticasone-salmeterol        Dose:  1 puff   Inhale 1 puff into the lungs 2 times daily   Refills:  0       albuterol (2.5 MG/3ML) 0.083% neb solution   Used for:   Chronic obstructive pulmonary disease, unspecified COPD type (H)        INHALE 1 VIAL VIA NEBULIZER EVERY 6 HOURS AS NEEDED   Quantity:  360 mL   Refills:  11       aspirin 81 MG EC tablet   Used for:  Unstable angina (H)        Dose:  81 mg   Take 1 tablet (81 mg) by mouth daily   Quantity:  90 tablet   Refills:  3       blood glucose monitoring lancets   Used for:  Type 2 diabetes mellitus with diabetic peripheral angiopathy without gangrene, without long-term current use of insulin (H)        Use to test blood sugar 3 times daily or as directed.   Quantity:  100 each   Refills:  PRN       blood glucose monitoring meter device kit   Used for:  Type 2 diabetes, HbA1C goal < 8% (H)        Use to test blood sugars 3 times daily or as directed.   Quantity:  1 kit   Refills:  0       blood glucose monitoring test strip   Commonly known as:  ONETOUCH ULTRA   Used for:  Type 2 diabetes mellitus with diabetic peripheral angiopathy without gangrene, without long-term current use of insulin (H)        Use to test blood sugars 3 times daily or as directed.   Quantity:  3 Box   Refills:  3       brimonidine 0.2 % ophthalmic solution   Commonly known as:  ALPHAGAN   Used for:  Primary open angle glaucoma of both eyes, severe stage        Dose:  1 drop   Place 1 drop into the right eye 2 times daily   Refills:  0       calcium citrate-vitamin D 315-250 MG-UNIT Tabs per tablet   Commonly known as:  CITRACAL   Used for:  Osteoporosis        Dose:  2 tablet   Take 2 tablets by mouth daily   Quantity:  120 tablet   Refills:  5       cetirizine 10 MG tablet   Commonly known as:  zyrTEC   Used for:  Seasonal allergic rhinitis, unspecified allergic rhinitis trigger        Dose:  10 mg   Take 1 tablet (10 mg) by mouth daily as needed for allergies   Quantity:  90 tablet   Refills:  3       COMPAZINE PO        Dose:  10 mg   Take 10 mg by mouth every 8 hours as needed for nausea   Refills:  0       CYANOCOBALAMIN PO        Dose:  2000  mcg   Take 2,000 mcg by mouth daily   Refills:  0       diclofenac 1 % Gel topical gel   Commonly known as:  VOLTAREN   Used for:  Primary osteoarthritis of both hands        Dose:  2 g   Apply 2 g topically 4 times daily to hands   Quantity:  100 g   Refills:  11       dorzolamide 2 % ophthalmic solution   Commonly known as:  TRUSOPT        Dose:  1 drop   Place 1 drop into the right eye 2 times daily   Refills:  0       EPINEPHrine 0.15 MG/0.3ML injection 2-pack   Commonly known as:  EPIPEN JR   Used for:  Bee sting reaction, undetermined intent, subsequent encounter        Dose:  0.15 mg   Inject 0.3 mLs (0.15 mg) into the muscle as needed for anaphylaxis   Quantity:  0.6 mL   Refills:  1       escitalopram 10 MG tablet   Commonly known as:  LEXAPRO        Dose:  10 mg   Take 10 mg by mouth daily   Refills:  0       ESTRACE VAGINAL 0.1 MG/GM cream   Used for:  Atrophic vaginitis   Generic drug:  estradiol        USE DIME SIZE AMOUNT 3X A WEEK AT NIGHT   Quantity:  42.5 g   Refills:  11       estradiol 1 MG tablet   Commonly known as:  ESTRACE   Used for:  Person who has had sex change operation        Dose:  1 mg   Take 1 tablet (1 mg) by mouth daily   Quantity:  90 tablet   Refills:  3       ferrous sulfate 325 (65 FE) MG tablet   Commonly known as:  IRON        Dose:  325 mg   Take 1 tablet (325 mg) by mouth 2 times daily With meals   Quantity:  60 tablet   Refills:  2       fluticasone 50 MCG/ACT spray   Commonly known as:  FLONASE        Dose:  1 spray   Spray 1 spray into both nostrils daily   Refills:  0       HYDROMORPHONE HCL PO        Dose:  2 mg   Take 2 mg by mouth every 3 hours as needed for moderate to severe pain   Refills:  0       INCRUSE ELLIPTA 62.5 MCG/INH oral inhaler   Generic drug:  umeclidinium        Dose:  1 puff   Inhale 1 puff into the lungs daily   Refills:  0       lactulose 10 GM/15ML solution   Commonly known as:  CHRONULAC        Dose:  20 g   Take 20 g by mouth as needed for  constipation   Refills:  0       latanoprost 0.005 % ophthalmic solution   Commonly known as:  XALATAN   Used for:  Primary open angle glaucoma, stage unspecified        Dose:  1 drop   Place 1 drop into both eyes At Bedtime   Quantity:  2.5 mL   Refills:  11       levETIRAcetam 500 MG tablet   Commonly known as:  KEPPRA   Used for:  Nausea        Dose:  500 mg   Take 1 tablet (500 mg) by mouth 2 times daily   Quantity:  180 tablet   Refills:  1       lidocaine 5 % Patch   Commonly known as:  LIDODERM   Used for:  Chronic pain syndrome, 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 (H)        APPLY 1 TO 3 PATCHES TO PAINFUL AREA AT ONCE FOR UP TO 12 HOURS WITHIN A 24 HOUR PERIOD REMOVE AFTER 12 HOURS   Quantity:  30 patch   Refills:  5       lisinopril 10 MG tablet   Commonly known as:  PRINIVIL/ZESTRIL   Used for:  Essential hypertension with goal blood pressure less than 130/80        Dose:  10 mg   Take 1 tablet (10 mg) by mouth daily   Quantity:  90 tablet   Refills:  3       MEDICATION GIVEN BY INTRATHECAL PUMP - INSTRUCTION        continuous 2/8/18: Pump managed by [facility] in [place] [phone].  Conc: Bupivacaine 20 mg/mL and Fentanyl 2000 mcg/mL, morphine 2 mg/mL Continuous:  Fentanyl 796 mcg/day, Bupivacaine 7.96 mg/day, Morphine 0.796 mg/day  Boluses: Up to 7 boluses per 24-hr period of Bupivicaine 0.5994 mg and Fentanyl 59.9 mcg morphine 0.0599 mg. Pump Refill Date 02/28/18   Refills:  0       metFORMIN 500 MG 24 hr tablet   Commonly known as:  GLUCOPHAGE-XR   Used for:  Type 2 diabetes mellitus with diabetic peripheral angiopathy and gangrene, without long-term current use of insulin (H)        Dose:  500 mg   Take 1 tablet (500 mg) by mouth daily (with dinner)   Quantity:  90 tablet   Refills:  3       metoprolol succinate 25 MG 24 hr tablet   Commonly known as:  TOPROL-XL   Used for:  Old myocardial infarction        TAKE 1 TABLET (25 MG) BY MOUTH DAILY    Quantity:  30 tablet   Refills:  10       MULTIVITAMIN PO        Dose:  1 tablet   Take 1 tablet by mouth daily   Refills:  0       nitroGLYcerin 0.4 MG sublingual tablet   Commonly known as:  NITROSTAT   Used for:  Chronic systolic congestive heart failure (H), Old myocardial infarction        Dose:  0.4 mg   Place 1 tablet (0.4 mg) under the tongue every 5 minutes as needed for chest pain if you are still having symptoms after 3 doses (15 minutes) call 911.   Quantity:  25 tablet   Refills:  1       * order for DME   Used for:  Incontinence        Equipment being ordered: Depends briefs   Quantity:  1 Month   Refills:  11       * order for DME   Used for:  CVA (cerebral infarction), HTN (hypertension)        Equipment being ordered: Power Wheelchair   Quantity:  1 Device   Refills:  0       * order for DME   Used for:  Type 2 diabetes mellitus with other diabetic neurological complication        Equipment being ordered: Glucerna daily shakes with each meal   Quantity:  1 Box   Refills:  11       * order for DME   Used for:  Simple chronic bronchitis (H)        Equipment being ordered: Nebulizer and tubing supplies   Quantity:  1 Units   Refills:  0       * order for DME   Used for:  Chronic obstructive pulmonary disease, unspecified COPD type (H)        Equipment being ordered: CPAP supplies mask, hose, filters, cushion fax to [facility] at [phone] Disp: 10 DeviceRefills: prn Class: Local PrintStart: 2/10/2017   Quantity:  1 Device   Refills:  0       * order for DME   Used for:  COPD (chronic obstructive pulmonary disease) (H)        Equipment being ordered: CPAP supplies mask, hose, filters, cushion  fax to [facility] at [phone]   Quantity:  10 Device   Refills:  prn       * order for DME   Used for:  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 (H)        Hospital bed with side rails   Quantity:  1 Device   Refills:  0       * order for DME   Used for:  Status post bilateral  above knee amputation (H)        Full electric hospital bed with half rails  Dx: R86447, I110, J449 Length of need: lifetime   Quantity:  1 Device   Refills:  0       * order for DME   Used for:  Status post bilateral above knee amputation (H)        Wheel Chair Cushion: 18 x 18 inch Roho cushion   Quantity:  1 Device   Refills:  0       pantoprazole 40 MG EC tablet   Commonly known as:  PROTONIX   Used for:  Gastroesophageal reflux disease without esophagitis        Dose:  40 mg   Take 1 tablet (40 mg) by mouth daily   Refills:  0       polyethylene glycol Packet   Commonly known as:  MIRALAX/GLYCOLAX        Dose:  17 g   Take 17 g by mouth daily Dissolved in water or juice   Refills:  0       pregabalin 75 MG capsule   Commonly known as:  LYRICA   Used for:  Pain in both upper extremities        Dose:  150 mg   Take 2 capsules (150 mg) by mouth 2 times daily   Quantity:  120 capsule   Refills:  5       risperiDONE 0.5 MG tablet   Commonly known as:  risperDAL        Dose:  0.5 mg   Take 0.5 mg by mouth At Bedtime   Refills:  0       sucralfate 1 GM tablet   Commonly known as:  CARAFATE   Used for:  Adjustment disorder with depressed mood        Dose:  1 g   Take 1 tablet (1 g) by mouth 4 times daily May dissolve in 10 mL water is necessary. (Start upon completion of carafate suspension)   Quantity:  120 tablet   Refills:  11       traZODone 50 MG tablet   Commonly known as:  DESYREL        Dose:  150 mg   Take 150 mg by mouth At Bedtime   Refills:  0       vitamin D 2000 UNITS tablet        Dose:  2000 Units   Take 2,000 Units by mouth daily.   Quantity:  100 tablet   Refills:  3       zinc 50 MG Tabs        Dose:  1 tablet   Take 1 tablet by mouth daily   Refills:  0       * Notice:  This list has 9 medication(s) that are the same as other medications prescribed for you. Read the directions carefully, and ask your doctor or other care provider to review them with you.      STOP taking     acetaminophen-codeine  300-30 MG per tablet   Commonly known as:  TYLENOL #3           OMEPRAZOLE PO                Where to get your medicines      These medications were sent to [facility] - [place], [place] - [address]  [address], [facility] [address]     Phone:  [phone]     benzocaine-menthol 15-3.6 MG lozenge    ondansetron 8 MG ODT tab                Protect others around you: Learn how to safely use, store and throw away your medicines at www.disposemymeds.org.        Information about OPIOIDS     PRESCRIPTION OPIOIDS: WHAT YOU NEED TO KNOW    Prescription opioids can be used to help relieve moderate to severe pain and are often prescribed following a surgery or injury, or for certain health conditions. These medications can be an important part of treatment but also come with serious risks. It is important to work with your health care provider to make sure you are getting the safest, most effective care.    WHAT ARE THE RISKS AND SIDE EFFECTS OF OPIOID USE?  Prescription opioids carry serious risks of addiction and overdose, especially with prolonged use. An opioid overdose, often marked by slowed breathing can cause sudden death. The use of prescription opioids can have a number of side effects as well, even when taken as directed:      Tolerance - meaning you might need to take more of a medication for the same pain relief    Physical dependence - meaning you have symptoms of withdrawal when a medication is stopped    Increased sensitivity to pain    Constipation    Nausea, vomiting, and dry mouth    Sleepiness and dizziness    Confusion    Depression    Low levels of testosterone that can result in lower sex drive, energy, and strength    Itching and sweating    RISKS ARE GREATER WITH:    History of drug misuse, substance use disorder, or overdose    Mental health conditions (such as depression or anxiety)    Sleep apnea    Older age (65 years or older)    Pregnancy    Avoid alcohol  while taking prescription opioids.   Also, unless specifically advised by your health care provider, medications to avoid include:    Benzodiazepines (such as Xanax or Valium)    Muscle relaxants (such as Soma or Flexeril)    Hypnotics (such as Ambien or Lunesta)    Other prescription opioids    KNOW YOUR OPTIONS:  Talk to your health care provider about ways to manage your pain that do not involve prescription opioids. Some of these options may actually work better and have fewer risks and side effects:    Pain relievers such as acetaminophen, ibuprofen, and naproxen    Some medications that are also used for depression or seizures    Physical therapy and exercise    Cognitive behavioral therapy, a psychological, goal-directed approach, in which patients learn how to modify physical, behavioral, and emotional triggers of pain and stress    IF YOU ARE PRESCRIBED OPIOIDS FOR PAIN:    Never take opioids in greater amounts or more often than prescribed    Follow up with your primary health care provider and work together to create a plan on how to manage your pain.    Talk about ways to help manage your pain that do not involve prescription opioids    Talk about all concerns and side effects    Help prevent misuse and abuse    Never sell or share prescription opioids    Never use another person's prescription opioids    Store prescription opioids in a secure place and out of reach of others (this may include visitors, children, friends, and family)    Visit www.cdc.gov/drugoverdose to learn about risks of opioid abuse and overdose    If you believe you may be struggling with addiction, tell your health care provider and ask for guidance or call [facility]'s National Helpline at 4-360-795-HELP    LEARN MORE / www.cdc.gov/drugoverdose/prescribing/guideline.html    Safely dispose of unused prescription opioids: Find your local drug take-back programs and more information about the importance of safe disposal at  www.doseofreality.[place].gov             Medication List: This is a list of all your medications and when to take them. Check marks below indicate your daily home schedule. Keep this list as a reference.      Medications           Morning Afternoon Evening Bedtime As Needed    ACETAMINOPHEN PO   Take 1,000 mg by mouth every 4 hours as needed for fever Not to exceed 4000 mg/day   Last time this was given:  650 mg on 2/9/2018  9:32 AM                                ADVAIR DISKUS 250-50 MCG/DOSE diskus inhaler   Inhale 1 puff into the lungs 2 times daily   Generic drug:  fluticasone-salmeterol                                albuterol (2.5 MG/3ML) 0.083% neb solution   INHALE 1 VIAL VIA NEBULIZER EVERY 6 HOURS AS NEEDED                                aspirin 81 MG EC tablet   Take 1 tablet (81 mg) by mouth daily                                atorvastatin 40 MG tablet   Commonly known as:  LIPITOR   Take 1 tablet (40 mg) by mouth daily   Last time this was given:  40 mg on 2/8/2018 10:34 PM                                benzocaine-menthol 15-3.6 MG lozenge   Commonly known as:  CEPACOL   Place 1 lozenge inside cheek every hour as needed for sore throat                                blood glucose monitoring lancets   Use to test blood sugar 3 times daily or as directed.                                blood glucose monitoring meter device kit   Use to test blood sugars 3 times daily or as directed.                                blood glucose monitoring test strip   Commonly known as:  ONETOUCH ULTRA   Use to test blood sugars 3 times daily or as directed.                                brimonidine 0.2 % ophthalmic solution   Commonly known as:  ALPHAGAN   Place 1 drop into the right eye 2 times daily   Last time this was given:  1 drop on 2/9/2018  8:10 AM                                calcium citrate-vitamin D 315-250 MG-UNIT Tabs per tablet   Commonly known as:  CITRACAL   Take 2 tablets by mouth daily                                 cetirizine 10 MG tablet   Commonly known as:  zyrTEC   Take 1 tablet (10 mg) by mouth daily as needed for allergies                                COMPAZINE PO   Take 10 mg by mouth every 8 hours as needed for nausea                                CYANOCOBALAMIN PO   Take 2,000 mcg by mouth daily                                diclofenac 1 % Gel topical gel   Commonly known as:  VOLTAREN   Apply 2 g topically 4 times daily to hands                                dorzolamide 2 % ophthalmic solution   Commonly known as:  TRUSOPT   Place 1 drop into the right eye 2 times daily   Last time this was given:  1 drop on 2/9/2018  8:10 AM                                EPINEPHrine 0.15 MG/0.3ML injection 2-pack   Commonly known as:  EPIPEN JR   Inject 0.3 mLs (0.15 mg) into the muscle as needed for anaphylaxis                                escitalopram 10 MG tablet   Commonly known as:  LEXAPRO   Take 10 mg by mouth daily   Last time this was given:  10 mg on 2/9/2018  8:11 AM                                ESTRACE VAGINAL 0.1 MG/GM cream   USE DIME SIZE AMOUNT 3X A WEEK AT NIGHT   Generic drug:  estradiol                                estradiol 1 MG tablet   Commonly known as:  ESTRACE   Take 1 tablet (1 mg) by mouth daily   Last time this was given:  1 mg on 2/9/2018  8:11 AM                                ferrous sulfate 325 (65 FE) MG tablet   Commonly known as:  IRON   Take 1 tablet (325 mg) by mouth 2 times daily With meals   Last time this was given:  325 mg on 2/9/2018  8:13 AM                                fluticasone 50 MCG/ACT spray   Commonly known as:  FLONASE   Spray 1 spray into both nostrils daily                                hydrochlorothiazide 12.5 MG capsule   Commonly known as:  MICROZIDE   Take 1 capsule (12.5 mg) by mouth daily   Last time this was given:  12.5 mg on 2/9/2018  8:21 AM                                HYDROMORPHONE HCL PO   Take 2 mg by mouth every 3 hours as needed for  moderate to severe pain   Last time this was given:  2 mg on 2/9/2018 12:02 PM                                INCRUSE ELLIPTA 62.5 MCG/INH oral inhaler   Inhale 1 puff into the lungs daily   Generic drug:  umeclidinium                                lactulose 10 GM/15ML solution   Commonly known as:  CHRONULAC   Take 20 g by mouth as needed for constipation   Last time this was given:  20 g on 2/9/2018  9:32 AM                                latanoprost 0.005 % ophthalmic solution   Commonly known as:  XALATAN   Place 1 drop into both eyes At Bedtime   Last time this was given:  1 drop on 2/8/2018 10:33 PM                                levETIRAcetam 500 MG tablet   Commonly known as:  KEPPRA   Take 1 tablet (500 mg) by mouth 2 times daily   Last time this was given:  500 mg on 2/9/2018  8:10 AM                                lidocaine 5 % Patch   Commonly known as:  LIDODERM   APPLY 1 TO 3 PATCHES TO PAINFUL AREA AT ONCE FOR UP TO 12 HOURS WITHIN A 24 HOUR PERIOD REMOVE AFTER 12 HOURS                                lisinopril 10 MG tablet   Commonly known as:  PRINIVIL/ZESTRIL   Take 1 tablet (10 mg) by mouth daily   Last time this was given:  10 mg on 2/9/2018  8:23 AM                                MEDICATION GIVEN BY INTRATHECAL PUMP - INSTRUCTION   continuous 2/8/18: Pump managed by [facility] in [place] [phone].  Conc: Bupivacaine 20 mg/mL and Fentanyl 2000 mcg/mL, morphine 2 mg/mL Continuous:  Fentanyl 796 mcg/day, Bupivacaine 7.96 mg/day, Morphine 0.796 mg/day  Boluses: Up to 7 boluses per 24-hr period of Bupivicaine 0.5994 mg and Fentanyl 59.9 mcg morphine 0.0599 mg. Pump Refill Date 02/28/18   Last time this was given:  2/9/2018  6:00 AM                                metFORMIN 500 MG 24 hr tablet   Commonly known as:  GLUCOPHAGE-XR   Take 1 tablet (500 mg) by mouth daily (with dinner)   Last time this was given:  500 mg on 2/8/2018  4:43 PM                                 metoprolol succinate 25 MG 24 hr tablet   Commonly known as:  TOPROL-XL   TAKE 1 TABLET (25 MG) BY MOUTH DAILY   Last time this was given:  25 mg on 2/9/2018  8:23 AM                                MULTIVITAMIN PO   Take 1 tablet by mouth daily                                nitroGLYcerin 0.4 MG sublingual tablet   Commonly known as:  NITROSTAT   Place 1 tablet (0.4 mg) under the tongue every 5 minutes as needed for chest pain if you are still having symptoms after 3 doses (15 minutes) call 911.                                ondansetron 8 MG ODT tab   Commonly known as:  ZOFRAN-ODT   Take 1 tablet (8 mg) by mouth every 8 hours as needed for nausea or vomiting                                * order for DME   Equipment being ordered: Depends briefs                                * order for DME   Equipment being ordered: Power Wheelchair                                * order for DME   Equipment being ordered: Glucerna daily shakes with each meal                                * order for DME   Equipment being ordered: Nebulizer and tubing supplies                                * order for DME   Equipment being ordered: CPAP supplies mask, hose, filters, cushion fax to [facility] at [phone] Disp: 10 DeviceRefills: prn Class: Local PrintStart: 2/10/2017                                * order for DME   Equipment being ordered: CPAP supplies mask, hose, filters, cushion  fax to [facility] at [phone]                                * order for DME   Hospital bed with side rails                                * order for DME   Full electric hospital bed with half rails  Dx: D59811, I110, J449 Length of need: lifetime                                * order for DME   Wheel Chair Cushion: 18 x 18 inch Roho cushion                                pantoprazole 40 MG EC tablet   Commonly known as:  PROTONIX   Take 1 tablet (40 mg) by mouth daily   Last time this was given:  40 mg on 2/9/2018  8:11 AM                                 Phenytoin Sodium Extended 200 MG Caps   Take 200 mg by mouth 2 times daily   Last time this was given:  200 mg on 2/9/2018 11:55 AM                                polyethylene glycol Packet   Commonly known as:  MIRALAX/GLYCOLAX   Take 17 g by mouth daily Dissolved in water or juice   Last time this was given:  17 g on 2/9/2018  8:21 AM                                pregabalin 75 MG capsule   Commonly known as:  LYRICA   Take 2 capsules (150 mg) by mouth 2 times daily   Last time this was given:  150 mg on 2/9/2018  8:11 AM                                risperiDONE 0.5 MG tablet   Commonly known as:  risperDAL   Take 0.5 mg by mouth At Bedtime   Last time this was given:  0.5 mg on 2/8/2018 10:34 PM                                sucralfate 1 GM tablet   Commonly known as:  CARAFATE   Take 1 tablet (1 g) by mouth 4 times daily May dissolve in 10 mL water is necessary. (Start upon completion of carafate suspension)   Last time this was given:  1 g on 2/9/2018 11:55 AM                                traZODone 50 MG tablet   Commonly known as:  DESYREL   Take 150 mg by mouth At Bedtime   Last time this was given:  150 mg on 2/8/2018 10:34 PM                                vitamin D 2000 UNITS tablet   Take 2,000 Units by mouth daily.                                zinc 50 MG Tabs   Take 1 tablet by mouth daily                                * Notice:  This list has 9 medication(s) that are the same as other medications prescribed for you. Read the directions carefully, and ask your doctor or other care provider to review them with you.

## 2018-02-08 NOTE — IP AVS SNAPSHOT
Unit [address]D Observation [address] [address]    Phone:  [phone]    Fax:  [phone]                                       After Visit Summary   2/8/2018    Sonya Foote    MRN: [id number]           After Visit Summary Signature Page     I have received my discharge instructions, and my questions have been answered. I have discussed any challenges I see with this plan with the nurse or doctor.    ..........................................................................................................................................  Patient/Patient Representative Signature      ..........................................................................................................................................  Patient Representative Print Name and Relationship to Patient    ..................................................               ................................................  Date                                            Time    ..........................................................................................................................................  Reviewed by Signature/Title    ...................................................              ..............................................  Date                                                            Time

## 2018-02-09 VITALS
SYSTOLIC BLOOD PRESSURE: 121 MMHG | TEMPERATURE: 99.8 F | DIASTOLIC BLOOD PRESSURE: 65 MMHG | RESPIRATION RATE: 18 BRPM | HEART RATE: 91 BPM | OXYGEN SATURATION: 93 %

## 2018-02-09 LAB
ANION GAP SERPL CALCULATED.3IONS-SCNC: 8 MMOL/L (ref 3–14)
BUN SERPL-MCNC: 9 MG/DL (ref 7–30)
CALCIUM SERPL-MCNC: 8.7 MG/DL (ref 8.5–10.1)
CHLORIDE SERPL-SCNC: 107 MMOL/L (ref 94–109)
CO2 SERPL-SCNC: 23 MMOL/L (ref 20–32)
CREAT SERPL-MCNC: 0.82 MG/DL (ref 0.52–1.04)
DEPRECATED S PYO AG THROAT QL EIA: NORMAL
ERYTHROCYTE [DISTWIDTH] IN BLOOD BY AUTOMATED COUNT: 16.5 % (ref 10–15)
FLUAV+FLUBV RNA SPEC QL NAA+PROBE: NEGATIVE
FLUAV+FLUBV RNA SPEC QL NAA+PROBE: NEGATIVE
GFR SERPL CREATININE-BSD FRML MDRD: 69 ML/MIN/1.7M2
GLUCOSE BLDC GLUCOMTR-MCNC: 97 MG/DL (ref 70–99)
GLUCOSE SERPL-MCNC: 92 MG/DL (ref 70–99)
HCT VFR BLD AUTO: 35.2 % (ref 35–47)
HGB BLD-MCNC: 11.6 G/DL (ref 11.7–15.7)
LACTATE BLD-SCNC: 1.8 MMOL/L (ref 0.7–2)
MCH RBC QN AUTO: 28.1 PG (ref 26.5–33)
MCHC RBC AUTO-ENTMCNC: 33 G/DL (ref 31.5–36.5)
MCV RBC AUTO: 85 FL (ref 78–100)
PLATELET # BLD AUTO: 190 10E9/L (ref 150–450)
POTASSIUM SERPL-SCNC: 3.4 MMOL/L (ref 3.4–5.3)
RBC # BLD AUTO: 4.13 10E12/L (ref 3.8–5.2)
RSV RNA SPEC NAA+PROBE: NEGATIVE
SODIUM SERPL-SCNC: 139 MMOL/L (ref 133–144)
SPECIMEN SOURCE: NORMAL
SPECIMEN SOURCE: NORMAL
WBC # BLD AUTO: 7.5 10E9/L (ref 4–11)

## 2018-02-09 PROCEDURE — 87040 BLOOD CULTURE FOR BACTERIA: CPT | Mod: 91 | Performed by: NURSE PRACTITIONER

## 2018-02-09 PROCEDURE — 36415 COLL VENOUS BLD VENIPUNCTURE: CPT | Performed by: NURSE PRACTITIONER

## 2018-02-09 PROCEDURE — 80048 BASIC METABOLIC PNL TOTAL CA: CPT | Performed by: PHYSICIAN ASSISTANT

## 2018-02-09 PROCEDURE — 83605 ASSAY OF LACTIC ACID: CPT | Performed by: PHYSICIAN ASSISTANT

## 2018-02-09 PROCEDURE — 25000132 ZZH RX MED GY IP 250 OP 250 PS 637: Performed by: PHYSICIAN ASSISTANT

## 2018-02-09 PROCEDURE — 85027 COMPLETE CBC AUTOMATED: CPT | Performed by: PHYSICIAN ASSISTANT

## 2018-02-09 PROCEDURE — 25000128 H RX IP 250 OP 636: Performed by: PHYSICIAN ASSISTANT

## 2018-02-09 PROCEDURE — 36415 COLL VENOUS BLD VENIPUNCTURE: CPT | Performed by: PHYSICIAN ASSISTANT

## 2018-02-09 PROCEDURE — G0378 HOSPITAL OBSERVATION PER HR: HCPCS

## 2018-02-09 PROCEDURE — 25000132 ZZH RX MED GY IP 250 OP 250 PS 637: Performed by: NURSE PRACTITIONER

## 2018-02-09 PROCEDURE — 87631 RESP VIRUS 3-5 TARGETS: CPT | Performed by: PHYSICIAN ASSISTANT

## 2018-02-09 PROCEDURE — 00000146 ZZHCL STATISTIC GLUCOSE BY METER IP

## 2018-02-09 RX ORDER — PHENYTOIN SODIUM 100 MG/1
200 CAPSULE, EXTENDED RELEASE ORAL 2 TIMES DAILY
Status: DISCONTINUED | OUTPATIENT
Start: 2018-02-09 | End: 2018-02-09 | Stop reason: HOSPADM

## 2018-02-09 RX ORDER — ACETAMINOPHEN 325 MG/1
650 TABLET ORAL ONCE
Status: COMPLETED | OUTPATIENT
Start: 2018-02-09 | End: 2018-02-09

## 2018-02-09 RX ORDER — ACETAMINOPHEN 325 MG/1
650 TABLET ORAL EVERY 4 HOURS PRN
Status: DISCONTINUED | OUTPATIENT
Start: 2018-02-09 | End: 2018-02-09 | Stop reason: HOSPADM

## 2018-02-09 RX ORDER — ONDANSETRON 4 MG/1
8 TABLET, ORALLY DISINTEGRATING ORAL EVERY 8 HOURS PRN
Status: DISCONTINUED | OUTPATIENT
Start: 2018-02-09 | End: 2018-02-09 | Stop reason: HOSPADM

## 2018-02-09 RX ORDER — LACTULOSE 10 G/15ML
20 SOLUTION ORAL DAILY
Status: DISCONTINUED | OUTPATIENT
Start: 2018-02-09 | End: 2018-02-09 | Stop reason: HOSPADM

## 2018-02-09 RX ORDER — PANTOPRAZOLE SODIUM 40 MG/1
40 TABLET, DELAYED RELEASE ORAL DAILY
COMMUNITY
Start: 2018-02-09 | End: 2018-03-07

## 2018-02-09 RX ORDER — BRIMONIDINE TARTRATE 2 MG/ML
1 SOLUTION/ DROPS OPHTHALMIC 2 TIMES DAILY
COMMUNITY
Start: 2018-02-09 | End: 2018-03-29

## 2018-02-09 RX ORDER — ONDANSETRON 8 MG/1
8 TABLET, ORALLY DISINTEGRATING ORAL EVERY 8 HOURS PRN
Qty: 30 TABLET | Refills: 2 | Status: SHIPPED | OUTPATIENT
Start: 2018-02-09 | End: 2018-03-20

## 2018-02-09 RX ADMIN — BENZOCAINE, MENTHOL 1 LOZENGE: 15; 3.6 LOZENGE ORAL at 13:31

## 2018-02-09 RX ADMIN — METOPROLOL SUCCINATE 25 MG: 25 TABLET, EXTENDED RELEASE ORAL at 08:23

## 2018-02-09 RX ADMIN — PANTOPRAZOLE SODIUM 40 MG: 40 TABLET, DELAYED RELEASE ORAL at 08:11

## 2018-02-09 RX ADMIN — EXTENDED PHENYTOIN SODIUM 200 MG: 100 CAPSULE ORAL at 11:55

## 2018-02-09 RX ADMIN — POLYETHYLENE GLYCOL 3350 17 G: 17 POWDER, FOR SOLUTION ORAL at 08:21

## 2018-02-09 RX ADMIN — ACETAMINOPHEN 650 MG: 325 TABLET, FILM COATED ORAL at 06:44

## 2018-02-09 RX ADMIN — LISINOPRIL 10 MG: 10 TABLET ORAL at 08:23

## 2018-02-09 RX ADMIN — LEVETIRACETAM 500 MG: 500 TABLET ORAL at 08:10

## 2018-02-09 RX ADMIN — ESCITALOPRAM OXALATE 10 MG: 10 TABLET ORAL at 08:11

## 2018-02-09 RX ADMIN — ESTRADIOL 1 MG: 1 TABLET ORAL at 08:11

## 2018-02-09 RX ADMIN — BRIMONIDINE TARTRATE 1 DROP: 2 SOLUTION OPHTHALMIC at 08:10

## 2018-02-09 RX ADMIN — SODIUM CHLORIDE 500 ML: 9 INJECTION, SOLUTION INTRAVENOUS at 05:08

## 2018-02-09 RX ADMIN — FERROUS SULFATE 325 MG: 325 TABLET, FILM COATED ORAL at 08:13

## 2018-02-09 RX ADMIN — PREGABALIN 150 MG: 75 CAPSULE ORAL at 08:11

## 2018-02-09 RX ADMIN — HYDROCHLOROTHIAZIDE 12.5 MG: 12.5 CAPSULE ORAL at 08:21

## 2018-02-09 RX ADMIN — ACETAMINOPHEN AND CODEINE PHOSPHATE 1 TABLET: 300; 30 TABLET ORAL at 04:42

## 2018-02-09 RX ADMIN — LACTULOSE 20 G: 20 SOLUTION ORAL at 09:32

## 2018-02-09 RX ADMIN — HYDROMORPHONE HYDROCHLORIDE 2 MG: 2 TABLET ORAL at 12:02

## 2018-02-09 RX ADMIN — SUCRALFATE 1 G: 1 TABLET ORAL at 11:55

## 2018-02-09 RX ADMIN — ACETAMINOPHEN 650 MG: 325 TABLET, FILM COATED ORAL at 09:32

## 2018-02-09 RX ADMIN — SUCRALFATE 1 G: 1 TABLET ORAL at 08:13

## 2018-02-09 RX ADMIN — DORZOLAMIDE HYDROCHLORIDE 1 DROP: 20 SOLUTION OPHTHALMIC at 08:10

## 2018-02-09 NOTE — PLAN OF CARE
Problem: Patient Care Overview  Goal: Plan of Care/Patient Progress Review  Outpatient/Observation goals to be met before discharge home:  -Diagnostic tests and consults completed and resulted : No  -Vital signs normal or at patient baseline : Yes  -Adequate pain control on oral analgesics : Yes  -Returns to baseline functional status : In process  -Safe disposition plan has been identified: To prior living conditions when stable

## 2018-02-09 NOTE — PLAN OF CARE
Problem: Patient Care Overview  Goal: Plan of Care/Patient Progress Review  Outcome: Adequate for Discharge Date Met: 02/09/18    Observation goals PRIOR TO DISCHARGE       -diagnostic tests and consults completed and resulted: Blood culture result pending  -vital signs normal or at patient baseline: Temp 99.8  -adequate pain control on oral analgesics: YES  -returns to baseline functional status: YES  -safe disposition plan has been identified: YES  Nurse to notify provider when observation goals have been met and patient is ready for discharge.         AVSS, T 99.8, given lozenge for dry cough. Pt is stable and discharged to group home at 1440 via medical transport. Sent pt's belongings and will  medications at pharmacy. PIV d/c. Discharge education provided with teach back. Pt verbalized understanding. Strep test collected and sent.

## 2018-02-09 NOTE — PROGRESS NOTES
Patient was noted to have tempeture of 102.9 F and soft BP 95/61 at 0435. Influenza was checked and was negative. Lactate 1.8.   Urine culture was added to previous urine sample in the lab. She was give Tylenol. Patient was examined and noted to be stable.  ml bolus was initiated. Recheck vitals reveal Temperature 100.7F and /58.

## 2018-02-09 NOTE — PROGRESS NOTES
Care Coordinator Progress Note     Admission Date/Time:  2/8/2018  Attending MD:  Nevin Wagner, *     Data  Chart reviewed, discussed with interdisciplinary team.   Patient was admitted for:    Upper GI bleed  Non-intractable vomiting with nausea, unspecified vomiting type  Primary open angle glaucoma of both eyes, severe stage  Gastroesophageal reflux disease without esophagitis  Throat pain.    Concerns with insurance coverage for discharge needs: None.  Current Living Situation: Patient lives in a group home.  Support System: Supportive and Involved  Services Involved: Home Care  Transportation: [facility]  Barriers to Discharge: chronically ill and dependent with mobility/activities of daily living         Assessment   Writer called group home and they state pt can return at anytime. Writer then called [facility]; ETA 1400 pm. Pt primary RN updated on ETA of transportation.      Plan  Anticipated Discharge Date:  02/09/18  Anticipated Discharge Plan:  DC with provider recommendations.     Kiley Alarcon RN [facility] ED/6D Obs  [phone]

## 2018-02-09 NOTE — PROGRESS NOTES
2/8/18: Received notification ED visit 2/8 for member - will continue to review Epic.     Cm received call from member stating she is in ED for black, tarry stools  - upper GI bleed.  She states that she is staying overnight under observation.  She will keep CM up to date.     Jamie Sharma RN, N  [facility]

## 2018-02-09 NOTE — PLAN OF CARE
Problem: Patient Care Overview  Goal: Plan of Care/Patient Progress Review  Outpatient/Observation goals to be met before discharge home:  -Diagnostic tests and consults completed and resulted : No  -Vital signs normal or at patient baseline : No. Fever .T 102.9  -Adequate pain control on oral analgesics :  C/o of Low back pain. PRN Tylenol #3 given  -Returns to baseline functional status :  No  -Safe disposition plan has been identified: To prior living conditions when stable   Lactate ordered and Bolus infusing. Will continue to monitor.

## 2018-02-09 NOTE — PLAN OF CARE
Problem: Patient Care Overview  Goal: Plan of Care/Patient Progress Review  -Diagnostic tests and consults completed and resulted : No  -Vital signs normal or at patient baseline : Yes  -Adequate pain control on oral analgesics : Yes  -Returns to baseline functional status : In process  -Safe disposition plan has been identified: To prior living conditions when stable

## 2018-02-09 NOTE — DISCHARGE SUMMARY
Observation Unit Discharge Summary    Sonya Foote MRN# [id number]   YOB: 1950 Age: 68 year old     Date of Admission:  2/8/2018  Date of Discharge:  2/9/2018  Admitting Physician:  Nevin Wagner MD  Discharge Physician:  Ying Toure MD  Discharging Service:  ED Observation      Primary Provider: Allan Casey          Discharge Diagnosis:     Black tarry stools and coffee ground emesis - resolved    Fevers - resolved     H/o vascular disease, CAD    Hypertension     Type 2 DM     H/o depression, schizoaffective disorder    H/o seizure disorder    H/o COPD, asthma, JOSE             Discharge Disposition:   Discharged to prior assisted living facility            Condition on Discharge:   Discharge condition: Stable   Code status on discharge: DNR / DNI           Procedures:   None          Discharge Medications:     Current Discharge Medication List      CONTINUE these medications which have CHANGED    Details   ondansetron (ZOFRAN-ODT) 8 MG ODT tab Take 1 tablet (8 mg) by mouth every 8 hours as needed for nausea or vomiting  Qty: 30 tablet, Refills: 2    Associated Diagnoses: Non-intractable vomiting with nausea, unspecified vomiting type      brimonidine (ALPHAGAN) 0.2 % ophthalmic solution Place 1 drop into the right eye 2 times daily    Associated Diagnoses: Primary open angle glaucoma of both eyes, severe stage      pantoprazole (PROTONIX) 40 MG EC tablet Take 1 tablet (40 mg) by mouth daily    Associated Diagnoses: Gastroesophageal reflux disease without esophagitis         CONTINUE these medications which have NOT CHANGED    Details   HYDROMORPHONE HCL PO Take 2 mg by mouth every 3 hours as needed for moderate to severe pain      escitalopram (LEXAPRO) 10 MG tablet Take 10 mg by mouth daily       traZODone (DESYREL) 50 MG tablet Take 150 mg by mouth At Bedtime       lactulose (CHRONULAC) 10 GM/15ML solution Take 20 g by mouth as needed for constipation      Prochlorperazine Maleate  (COMPAZINE PO) Take 10 mg by mouth every 8 hours as needed for nausea      ESTRACE VAGINAL 0.1 MG/GM cream USE DIME SIZE AMOUNT 3X A WEEK AT NIGHT  Qty: 42.5 g, Refills: 11    Associated Diagnoses: Atrophic vaginitis      metFORMIN (GLUCOPHAGE-XR) 500 MG 24 hr tablet Take 1 tablet (500 mg) by mouth daily (with dinner)  Qty: 90 tablet, Refills: 3    Associated Diagnoses: Type 2 diabetes mellitus with diabetic peripheral angiopathy and gangrene, without long-term current use of insulin (H)      sucralfate (CARAFATE) 1 GM tablet Take 1 tablet (1 g) by mouth 4 times daily May dissolve in 10 mL water is necessary. (Start upon completion of carafate suspension)  Qty: 120 tablet, Refills: 11    Associated Diagnoses: Adjustment disorder with depressed mood      umeclidinium (INCRUSE ELLIPTA) 62.5 MCG/INH oral inhaler Inhale 1 puff into the lungs daily      lidocaine (LIDODERM) 5 % Patch APPLY 1 TO 3 PATCHES TO PAINFUL AREA AT ONCE FOR UP TO 12 HOURS WITHIN A 24 HOUR PERIOD REMOVE AFTER 12 HOURS  Qty: 30 patch, Refills: 5    Associated Diagnoses: Chronic pain syndrome; 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 (H)      metoprolol (TOPROL-XL) 25 MG 24 hr tablet TAKE 1 TABLET (25 MG) BY MOUTH DAILY  Qty: 30 tablet, Refills: 10    Associated Diagnoses: Old myocardial infarction      polyethylene glycol (MIRALAX/GLYCOLAX) Packet Take 17 g by mouth daily Dissolved in water or juice       ACETAMINOPHEN PO Take 1,000 mg by mouth every 4 hours as needed for fever Not to exceed 4000 mg/day      pregabalin (LYRICA) 75 MG capsule Take 2 capsules (150 mg) by mouth 2 times daily  Qty: 120 capsule, Refills: 5    Associated Diagnoses: Pain in both upper extremities      cetirizine (ZYRTEC) 10 MG tablet Take 1 tablet (10 mg) by mouth daily as needed for allergies  Qty: 90 tablet, Refills: 3    Associated Diagnoses: Seasonal allergic rhinitis, unspecified allergic rhinitis trigger      diclofenac (VOLTAREN) 1 % GEL topical  gel Apply 2 g topically 4 times daily to hands  Qty: 100 g, Refills: 11    Associated Diagnoses: Primary osteoarthritis of both hands      lisinopril (PRINIVIL/ZESTRIL) 10 MG tablet Take 1 tablet (10 mg) by mouth daily  Qty: 90 tablet, Refills: 3    Associated Diagnoses: Essential hypertension with goal blood pressure less than 130/80      risperiDONE (RISPERDAL) 0.5 MG tablet Take 0.5 mg by mouth At Bedtime      ferrous sulfate (IRON) 325 (65 FE) MG tablet Take 1 tablet (325 mg) by mouth 2 times daily With meals  Qty: 60 tablet, Refills: 2      albuterol (2.5 MG/3ML) 0.083% neb solution INHALE 1 VIAL VIA NEBULIZER EVERY 6 HOURS AS NEEDED  Qty: 360 mL, Refills: 11    Associated Diagnoses: Chronic obstructive pulmonary disease, unspecified COPD type (H)      CYANOCOBALAMIN PO Take 2,000 mcg by mouth daily      fluticasone (FLONASE) 50 MCG/ACT nasal spray Spray 1 spray into both nostrils daily      ADVAIR DISKUS 250-50 MCG/DOSE diskus inhaler Inhale 1 puff into the lungs 2 times daily       calcium citrate-vitamin D (CITRACAL) 315-250 MG-UNIT TABS Take 2 tablets by mouth daily  Qty: 120 tablet, Refills: 5    Associated Diagnoses: Osteoporosis      hydrochlorothiazide (MICROZIDE) 12.5 MG capsule Take 1 capsule (12.5 mg) by mouth daily  Qty: 90 capsule, Refills: 3    Associated Diagnoses: Essential hypertension with goal blood pressure less than 130/80      estradiol (ESTRACE) 1 MG tablet Take 1 tablet (1 mg) by mouth daily  Qty: 90 tablet, Refills: 3    Associated Diagnoses: Person who has had sex change operation      levETIRAcetam (KEPPRA) 500 MG tablet Take 1 tablet (500 mg) by mouth 2 times daily  Qty: 180 tablet, Refills: 1    Associated Diagnoses: Nausea      aspirin EC 81 MG EC tablet Take 1 tablet (81 mg) by mouth daily  Qty: 90 tablet, Refills: 3    Associated Diagnoses: Unstable angina (H)      phenytoin 200 MG CAPS Take 200 mg by mouth 2 times daily  Qty: 60 capsule, Refills: 0    Associated Diagnoses:  Seizure disorder (H)      dorzolamide (TRUSOPT) 2 % ophthalmic solution Place 1 drop into the right eye 2 times daily       zinc 50 MG TABS Take 1 tablet by mouth daily      nitroglycerin (NITROSTAT) 0.4 MG SL tablet Place 1 tablet (0.4 mg) under the tongue every 5 minutes as needed for chest pain if you are still having symptoms after 3 doses (15 minutes) call 911.  Qty: 25 tablet, Refills: 1    Associated Diagnoses: Chronic systolic congestive heart failure (H); Old myocardial infarction      MEDICATION GIVEN BY INTRATHECAL PUMP - INSTRUCTION continuous 2/8/18: Pump managed by [facility] in [place] [phone].   Conc: Bupivacaine 20 mg/mL and Fentanyl 2000 mcg/mL, morphine 2 mg/mL  Continuous:  Fentanyl 796 mcg/day, Bupivacaine 7.96 mg/day, Morphine 0.796 mg/day   Boluses: Up to 7 boluses per 24-hr period of Bupivicaine 0.5994 mg and Fentanyl 59.9 mcg morphine 0.0599 mg.  Pump Refill Date 02/28/18      latanoprost (XALATAN) 0.005 % ophthalmic solution Place 1 drop into both eyes At Bedtime  Qty: 2.5 mL, Refills: 11    Comments: Plz remind pt to f/u as scheduled, 3/25/16. Thanks!  Associated Diagnoses: Primary open angle glaucoma, stage unspecified      atorvastatin (LIPITOR) 40 MG tablet Take 1 tablet (40 mg) by mouth daily  Qty: 90 tablet, Refills: 3    Associated Diagnoses: Hyperlipidemia LDL goal <100      Cholecalciferol (VITAMIN D) 2000 UNITS tablet Take 2,000 Units by mouth daily.  Qty: 100 tablet, Refills: 3      Multiple Vitamin (MULTIVITAMIN OR) Take 1 tablet by mouth daily       blood glucose monitoring (ONE TOUCH ULTRASOFT) lancets Use to test blood sugar 3 times daily or as directed.  Qty: 100 each, Refills: PRN    Associated Diagnoses: Type 2 diabetes mellitus with diabetic peripheral angiopathy without gangrene, without long-term current use of insulin (H)      blood glucose monitoring (ONE TOUCH ULTRA) test strip Use to test blood sugars 3 times daily or as  directed.  Qty: 3 Box, Refills: 3    Associated Diagnoses: Type 2 diabetes mellitus with diabetic peripheral angiopathy without gangrene, without long-term current use of insulin (H)      !! order for DME Full electric hospital bed with half rails    Dx: H94983, I110, J449  Length of need: lifetime  Qty: 1 Device, Refills: 0    Associated Diagnoses: Status post bilateral above knee amputation (H)      !! order for DME Wheel Chair Cushion: 18 x 18 inch Roho cushion  Qty: 1 Device, Refills: 0    Associated Diagnoses: Status post bilateral above knee amputation (H)      !! order for DME Hospital bed with side rails  Qty: 1 Device, Refills: 0    Associated Diagnoses: 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 (H)      EPINEPHrine (EPIPEN JR) 0.15 MG/0.3ML injection Inject 0.3 mLs (0.15 mg) into the muscle as needed for anaphylaxis  Qty: 0.6 mL, Refills: 1    Associated Diagnoses: Bee sting reaction, undetermined intent, subsequent encounter      !! order for DME Equipment being ordered: CPAP supplies mask, hose, filters, cushion    fax to [facility] at [phone]  Qty: 10 Device, Refills: prn    Associated Diagnoses: COPD (chronic obstructive pulmonary disease) (H)      !! order for DME Equipment being ordered: CPAP supplies mask, hose, filters, cushion fax to [facility] at [phone]  Disp: 10 Device Refills: prn   Class: Local Print Start: 2/10/2017  Qty: 1 Device, Refills: 0    Associated Diagnoses: Chronic obstructive pulmonary disease, unspecified COPD type (H)      !! order for DME Equipment being ordered: Nebulizer and tubing supplies  Qty: 1 Units, Refills: 0    Associated Diagnoses: Simple chronic bronchitis (H)      blood glucose monitoring (ONE TOUCH ULTRA 2) meter device kit Use to test blood sugars 3 times daily or as directed.  Qty: 1 kit, Refills: 0    Associated Diagnoses: Type 2 diabetes, HbA1C goal < 8% (H)      !! order for DME Equipment being ordered: Glucerna daily shakes  "with each meal  Qty: 1 Box, Refills: 11    Associated Diagnoses: Type 2 diabetes mellitus with other diabetic neurological complication      !! ORDER FOR DME Equipment being ordered: Power Wheelchair  Qty: 1 Device, Refills: 0    Associated Diagnoses: CVA (cerebral infarction); HTN (hypertension)      !! ORDER FOR DME Equipment being ordered: Depends briefs  Qty: 1 Month, Refills: 11    Associated Diagnoses: Incontinence       !! - Potential duplicate medications found. Please discuss with provider.      STOP taking these medications       OMEPRAZOLE PO Comments:   Reason for Stopping:         acetaminophen-codeine (TYLENOL #3) 300-30 MG per tablet Comments:   Reason for Stopping:                  Consultations:   No consultations were requested during this admission             Brief History of Illness:   Sonya Foote is a 68 year old woman with PMHx significant for CAD, s/p inferior MI with stents placed 2016, hypertension, hyperlipidemia, PVD, s/p bilateral AKA, sickle cell trait, iron deficiency anemia, JOSE, COPD, asthma, GERD, dysphagia with history of esophageal stricture s/p multiple dilations, h/o GI bleeding, gastroparesis, NIDDM, neuropathy, chronic low back pain (intrathecal pump in place), DDD, BPPV, seizure disorder, h/o CVA x 3, carotid stenosis s/p stent to left carotid, depression/anxiety, schizoaffective disorder, 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      Patient presented to the ED after one episode of black tarry stool the morning of 2/8. She subsequently called 911. She also had nausea and one emesis that appeared \"coffee ground\" on evening of 2/7. She has had multiple episodes of GI bleeding, most recently in 2014, and some have required blood transfusions. Last EGD 1/10/18 showed benign appearing esophageal stenosis that was dilated.     In the ED: vitals notable for hypertension, otherwise unremarkable. Stool hemo-occult was negative. " "Hemoglobin WNL at 13.4. Overall labs appeared near baseline, though lactate elevated at 2.5. White count normal, no infectious symptoms. Given 1L IV fluids in ED along with home meds. Admitted to observation for serial hemoglobins, monitoring, and supportive care.          Hospital Course:   1. Black tarry stools and coffee ground emesis:  Patient presented to ED on 2/8 after one stool that morning that looked \"tarry black.\" She also reports nausea with one emesis the night before that was \"coffee ground.\" She has h/o multiple episodes GI bleeding, some stable, one with life threatening bleed requiring multiple transfusions. Her last episode of GIB was in 2014. Recent EGD from 1/10/18 with esophageal stenosis that was dilated, otherwise no acute findings. Of note, she is on ASA (held on admission), PPI, and carafate at home. She is also on iron supplement, which may cause black stools. Stool hemo-occult was negative. Serial hemoglobins were grossly stable accounting for some dilutional effect from IV fluids (range was from 11.6 to 13.4). She had no further black tarry stools, coffee ground emesis, or other overt signs of bleeding during admission.   -Continue daily PPI and carafate QID as prior to admission.   -Continue zofran, compazine prn nausea. Consider scheduling zofran TID if nausea persists.  -Patient was instructed to call PCP, [facility], or go to nearest ER if has recurrent hematemesis, hematochezia/melena, significant lightheadedness, or other concerning symptoms.     2. Fevers:  Patient was febrile on 2/9 early morning, tmax 102.9. Fever trended down: 101.3, 100.7, 100, 99.8. Blood pressures were initially soft but then stabilized and improved back to baseline. Patient reported feeling generally unwell, malaised with phantom limb pain, nausea, and mild abdominal discomfort. She also reported sore throat with tender R submandibular LN and mild rhinorrhea. Otherwise no localizing infectious symptoms. " Unclear etiology of fevers but given symptoms, suspect non-influenza viral illness. Rapid strep screen was collected but canceled due to collected in wrong container. Rapid flu and respiratory viral PCR (flu, RSV) negative. UA unremarkable, UC negative to date. No change in UOP from suprapubic catheter. No diarrhea. Blood cultures drawn and pending. Lactate and WBC count WNL. CXR and CT abdomen unrevealing.    -Antiemetics as above.  -Recommend OTC lozenges prn sore throat.   -Patient was instructed to get plenty of fluids and rest, and to call PCP, [facility], or go to nearest ER if has recurrent fevers or worsening infectious symptoms.     3. Hx of vascular disease to include CAD s/p stent, carotid stenosis s/p stent, CVA x3, PAD s/p bilateral AKA:  No acute vascular issues.   -Continue home atorvastatin.   -OK to resume home ASA since no e/o GI bleeding.     4. Hypertension:  BP elevated in ED to 140s-190s systolic. She was given her home antihypertensives and BP improved. She then had soft BP of 95/61 early this AM; she received IV fluid bolus and BP improved to 107/58. BP stable since then and has now improved to 121/65.   -Continue home antihypertensives.     5. Type 2 DM:  BG currently WNL.   -Continue BG checks before meals and home metformin.     6. H/o depression, schizoaffective disorder:  No acute issues.  -Continue home lyrica, lexapro, trazodone, risperdal.     7. H/o seizure disorder:  No acute issues.   -Continue home keppra, phenytoin.     8. H/o COPD, asthma, JOSE:  No acute issues.   -Continue home inhalers and nebs prn.     9. Transgender:   Male to female.  -Continue home estrogen therapy.             Final Day of Progress before Discharge:       Physical Exam:  Blood pressure 121/65, pulse 91, temperature 99.8  F (37.7  C), temperature source Oral, resp. rate 18, SpO2 93 %, not currently breastfeeding.    EXAM:  Constitutional: pleasant woman seen resting in bed, appears chronically ill,  alert, in no acute distress  Head: NCAT   Neck: neck supple, +tender mild R submandibular LAD, no other prominent cervical nodes   EENT: pupils minimally reactive c/w glaucoma/blindness, anicteric sclera, dry oral mucosa, no oropharyngeal erythema or lesions noted, teeth absent  Cardiovascular: RRR, no murmur or rub  Respiratory: non labored breathing on room air, lungs CTAB  Gastrointestinal: abdomen soft, mild generalized tenderness. Hypoactive BS. No masses, organomegaly. suprapubic catheter in place, C/D/I  Musculoskeletal: bilateral above knee amputations, no edema  Skin: no suspicious lesions or rashes  Neurologic: alert, oriented, speech normal, no focal deficits  Psychiatric: mentation appears normal and affect normal/bright         Data:  All laboratory data reviewed          Significant Results:     Lab Results   Component Value Date    WBC 7.5 02/09/2018    WBC 8.0 02/08/2018    WBC 8.6 12/06/2017    HGB 11.6 (L) 02/09/2018    HGB 13.0 02/08/2018    HGB 12.7 02/08/2018    HCT 35.2 02/09/2018    HCT 39.9 02/08/2018    HCT 38.5 12/06/2017     02/09/2018     02/08/2018     12/06/2017     02/09/2018     02/08/2018     12/06/2017    POTASSIUM 3.4 02/09/2018    POTASSIUM 4.0 02/08/2018    POTASSIUM 3.7 01/16/2018    CHLORIDE 107 02/09/2018    CHLORIDE 105 02/08/2018    CHLORIDE 106 12/06/2017    CO2 23 02/09/2018    CO2 25 02/08/2018    CO2 26 12/06/2017    BUN 9 02/09/2018    BUN 8 02/08/2018    BUN 8 12/06/2017    CR 0.82 02/09/2018    CR 0.43 (L) 02/08/2018    CR 0.48 (L) 01/16/2018    GLC 92 02/09/2018    GLC 95 02/08/2018    GLC 85 12/06/2017    SED 72 (H) 12/01/2015    SED 63 (H) 11/30/2015    SED 68 (H) 10/06/2013    DD 0.7 (H) 04/19/2017    TROPONIN 0.01 09/17/2016    TROPONIN 0.01 08/21/2014    TROPONIN 0.01 03/31/2014    TROPI <0.015 02/08/2018    TROPI <0.015 12/05/2017    TROPI <0.015 10/08/2017    AST 44 02/08/2018    AST Unsatisfactory specimen - hemolyzed  02/08/2018    AST 34 12/06/2017    ALT 60 (H) 02/08/2018    ALT 45 12/06/2017    ALT 43 12/05/2017    ALKPHOS 250 (H) 02/08/2018    ALKPHOS 235 (H) 12/06/2017    ALKPHOS 246 (H) 12/05/2017    BILITOTAL 0.2 02/08/2018    BILITOTAL 0.2 12/06/2017    BILITOTAL 0.1 (L) 12/05/2017    ALEX 32 08/02/2016    INR 1.04 02/08/2018    INR 1.07 12/05/2017    INR 1.16 (H) 05/31/2017      Recent Results (from the past 48 hour(s))   XR Chest Port 1 View    Narrative    XR CHEST PORT 1 VW  2/8/2018 9:35 AM      HISTORY: shortness of breath, cough;     COMPARISON: 5/31/2017    FINDINGS: Portable AP view of the chest. Mild pulmonary vascular  engorgement. No pneumothorax or substantial pleural fluid. Heart size  is normal.      Impression    IMPRESSION: Mild pulmonary vascular engorgement.    I have personally reviewed the examination and initial interpretation  and I agree with the findings.    RUFUS THOMAS MD   CT Abdomen Pelvis w/o Contrast    Narrative    EXAMINATION: CT ABDOMEN PELVIS W/O CONTRAST, 2/8/2018 1:24 PM    TECHNIQUE:  Helical CT images from the lung bases through the  symphysis pubis were obtained without contrast.  Coronal reformatted  images were generated at a workstation for further assessment.    COMPARISON: 12/5/2017.    HISTORY: abdominal pain, vomiting;     FINDINGS:    Abdomen and pelvis:   Liver: Normal noncontrast appearance of the liver.  Gallbladder: Mild prominence of the intrahepatic biliary tree,  potentially secondary to postcholecystectomy state. Small foci of  pneumobilia, similar to prior..  Spleen: Normal size. Small splenule.  Pancreas: Normal. Main pancreatic duct is not dilated.  Adrenal glands: No adrenal nodules.  Kidneys: Normal. No hydronephrosis. No genitourinary stones.  Bladder / Pelvic organs: Suprapubic catheter within decompressed  bladder. Small amount of air within the bladder, likely secondary to  catheterization. Prostate is not enlarged.  Bowel: No bowel wall thickening.  "Normal diameter small bowel and  colon. Moderate colonic stool burden. Appendectomy.  Lymph nodes: No retroperitoneal, mesenteric, or pelvic lymphadenopathy  by size criteria.  Fluid: No free fluid within the abdomen.  Vessels: No infrarenal aortic aneurysm. Moderate aortobiiliac  atherosclerotic plaque deposition. Right and left common iliac stents  extend into the external iliac arteries. Postoperative/postprocedural  changes overlying the right common femoral vascular structures.    Lung bases: No consolidation or pleural effusion. Coronary artery  calcifications.    Bones and soft tissues: No suspicious osseous lesions. Of left ninth  rib fracture. Intrathecal pump/tubular device in the right gluteal  subcutaneous soft tissues. Catheter tip is not included in the  field-of-view.      Impression    IMPRESSION:   1. No acute findings in the abdomen or pelvis.  2. Moderate colonic stool burden.    I have personally reviewed the examination and initial interpretation  and I agree with the findings.    SARAY THOMAS MD                Pending Results:   Unresulted Labs Ordered in the Past 30 Days of this Admission     Date and Time Order Name Status Description    2/9/2018 0938 Blood culture Preliminary     2/9/2018 0938 Blood culture Preliminary     2/9/2018 0444 Urine Culture Aerobic Bacterial Preliminary                 Discharge Instructions and Follow-Up:     Discharge Procedure Orders  Reason for your hospital stay   Order Comments: You were admitted for workup of concerning \"tarry black\" stool and nausea with one emesis that was \"coffee ground.\"     Activity   Order Comments: Your activity upon discharge: Activity as tolerated or as you were doing prior to admission with assistance as needed.   Order Specific Question Answer Comments   Is discharge order? Yes      When to contact your care team   Order Comments: Call your primary care provider, the [facility] ([phone]), or go to the nearest emergency " room for temperature >100.5, uncontrolled nausea/vomiting/diarrhea/constipation, recurrent black or bloody stools or vomit, unrelieved pain, bleeding not relieved with pressure, dizziness, chest pain, shortness of breath, loss of consciousness, and any new or concerning symptoms.     Follow Up and recommended labs and tests   Order Comments: Follow up with your primary care provider as needed at your discretion or as previously scheduled.    Drink plenty of fluids, get plenty of rest, and OK to use over-the-counter throat lozenges or numbing spray for sore throat as needed.     DNR/DNI     Diet   Order Comments: Follow this diet upon discharge: Continue mechanical/dental soft diet as tolerated. Be sure to drink plenty of non-caffeinated beverages. Supplement your diet with high-calorie snacks or nutritional supplements (e.g. Boost, Ensure, Resource Breeze) if needed to ensure adequate calorie intake.   Order Specific Question Answer Comments   Is discharge order? Yes             Deanne Wilcox DNP, APRN, CNP  ED Observation

## 2018-02-09 NOTE — PLAN OF CARE
Problem: Patient Care Overview  Goal: Plan of Care/Patient Progress Review  Outcome: Improving   Observation goals PRIOR TO DISCHARGE       -diagnostic tests and consults completed and resulted: Blood culture result pending  -vital signs normal or at patient baseline: Temp elevated but now trending down (99.8)  -adequate pain control on oral analgesics: YES  -returns to baseline functional status: NO   -safe disposition plan has been identified: NO  Nurse to notify provider when observation goals have been met and patient is ready for discharge.

## 2018-02-10 LAB
BACTERIA SPEC CULT: NO GROWTH
Lab: NORMAL
SPECIMEN SOURCE: NORMAL

## 2018-02-12 ENCOUNTER — TELEPHONE (OUTPATIENT)
Dept: INTERNAL MEDICINE | Facility: CLINIC | Age: 68
End: 2018-02-12

## 2018-02-12 DIAGNOSIS — Z89.511 STATUS POST BELOW KNEE AMPUTATION OF RIGHT LOWER EXTREMITY (H): ICD-10-CM

## 2018-02-12 DIAGNOSIS — G89.4 CHRONIC PAIN SYNDROME: ICD-10-CM

## 2018-02-12 RX ORDER — LIDOCAINE 50 MG/G
PATCH TOPICAL
Refills: 5 | Status: CANCELLED | OUTPATIENT
Start: 2018-02-12

## 2018-02-12 NOTE — TELEPHONE ENCOUNTER
"Hospital/TCU/ED for chronic condition Discharge Protocol    \"Hi, my name is Deandra Kathryn, a registered nurse, and I am calling from [facility].  I am calling to follow up and see how things are going for you after your recent emergency visit/hospital/TCU stay.\"    Tell me how you are doing now that you are home?\" I am ok.  I have horrible bronchitis.  I am coming to see Dr. Casey tomorrow.  (Patient did not want to talk on phone, said she wanted to sleep.  Stated she was OK.  Writer advised patient return to ED if symptoms worsen.  Patient understood.  Denied worsening SOB, black/tarry stools or emesis, etc).      Discharge Instructions    \"Let's review your discharge instructions.  What is/are the follow-up recommendations?  Pt. Response: I am seeing Dr. Casey tomorrow and I still need to make a follow up appointment with the surgeon.    \"Has an appointment with your primary care provider been scheduled?\"   Yes. (confirm)    \"When you see the provider, I would recommend that you bring your medications with you.\"    Medications    \"Tell me what changed about your medicines when you discharged?\"    Changes to chronic meds?    0-1    \"What questions do you have about your medications?\"    None     New diagnoses of heart failure, COPD, diabetes, or MI?    No              Medication reconciliation completed? Yes  Was MTM referral placed (*Make sure to put transitions as reason for referral)?   No    Call Summary    \"What questions or concerns do you have about your recent visit and your follow-up care?\"     none    \"If you have questions or things don't continue to improve, we encourage you contact us through the main clinic number (give number).  Even if the clinic is not open, triage nurses are available 24/7 to help you.     We would like you to know that our clinic has extended hours (provide information).  We also have urgent care (provide details on closest location and hours/contact info)\"      \"Thank you " "for your time and take care!\"             "

## 2018-02-13 ENCOUNTER — OFFICE VISIT (OUTPATIENT)
Dept: INTERNAL MEDICINE | Facility: CLINIC | Age: 68
End: 2018-02-13
Payer: COMMERCIAL

## 2018-02-13 VITALS
DIASTOLIC BLOOD PRESSURE: 56 MMHG | BODY MASS INDEX: 19.26 KG/M2 | WEIGHT: 123 LBS | HEART RATE: 78 BPM | TEMPERATURE: 98.6 F | SYSTOLIC BLOOD PRESSURE: 108 MMHG | OXYGEN SATURATION: 94 %

## 2018-02-13 DIAGNOSIS — J06.9 UPPER RESPIRATORY TRACT INFECTION, UNSPECIFIED TYPE: ICD-10-CM

## 2018-02-13 DIAGNOSIS — G89.4 CHRONIC PAIN SYNDROME: ICD-10-CM

## 2018-02-13 DIAGNOSIS — Z09 HOSPITAL DISCHARGE FOLLOW-UP: Primary | ICD-10-CM

## 2018-02-13 DIAGNOSIS — Z89.511 STATUS POST BELOW KNEE AMPUTATION OF RIGHT LOWER EXTREMITY (H): ICD-10-CM

## 2018-02-13 DIAGNOSIS — K92.1 BLACK TARRY STOOLS: ICD-10-CM

## 2018-02-13 PROCEDURE — 99495 TRANSJ CARE MGMT MOD F2F 14D: CPT | Performed by: INTERNAL MEDICINE

## 2018-02-13 RX ORDER — LIDOCAINE 50 MG/G
PATCH TOPICAL
Qty: 30 PATCH | Refills: 5 | Status: SHIPPED | OUTPATIENT
Start: 2018-02-13 | End: 2018-04-20

## 2018-02-13 RX ORDER — LEVOFLOXACIN 500 MG/1
500 TABLET, FILM COATED ORAL DAILY
Qty: 7 TABLET | Refills: 0 | Status: SHIPPED | OUTPATIENT
Start: 2018-02-13 | End: 2018-03-20

## 2018-02-13 NOTE — PROGRESS NOTES
SUBJECTIVE:   Sonya Foote is a 68 year old female who presents to clinic today for the following health issues:        RESPIRATORY SYMPTOMS      Duration: 3-4 days, she states that she mention these symptoms when she was in the hospital recently and apparently this was briefly assessed but she was discharged and told to follow-up with her primary physician if her symptoms worsen.  She has since noted that her cough is deep and deepened and she has now had increasing productive discolored sputum from baseline.  At times she feels somewhat chilled.    Description  nasal congestion, rhinorrhea,  facial pain/pressure, cough, wheezing, chills, headache, fatigue/malaise, hoarse voice, myalgias and conjunctival irritation    Severity: severe    Accompanying signs and symptoms: None    History (predisposing factors):  COPD    Precipitating or alleviating factors: Recently d/c from hospital- negative rapid strep and influenza    Therapies tried and outcome:  rest and fluids         Hospital Follow-up Visit:    Hospital/Nursing Home/IP Rehab Facility: [facility]  Date of Admission: 2/8/18  Date of Discharge: 2/9/18  Reason(s) for Admission: Black tarry stools             Problems taking medications regularly:  None       Medication changes since discharge: None       Problems adhering to non-medication therapy:  None    Summary of hospitalization:  [facility] discharge summary reviewed  Diagnostic Tests/Treatments reviewed.  Follow up needed: noted  Other Healthcare Providers Involved in Patient s Care:         None less Homecare  Update since discharge: stable.     Post Discharge Medication Reconciliation: discharge medications reconciled, continue medications without change.  Plan of care communicated with patient     Coding guidelines for this visit:  Type of Medical   Decision Making Face-to-Face Visit       within 7 Days of discharge Face-to-Face Visit        within 14 days of discharge  "  Moderate Complexity [id number]   High Complexity [id number]                    Hospital Course:   1. Black tarry stools and coffee ground emesis:  Patient presented to ED on 2/8 after one stool that morning that looked \"tarry black.\" She also reports nausea with one emesis the night before that was \"coffee ground.\" She has h/o multiple episodes GI bleeding, some stable, one with life threatening bleed requiring multiple transfusions. Her last episode of GIB was in 2014. Recent EGD from 1/10/18 with esophageal stenosis that was dilated, otherwise no acute findings. Of note, she is on ASA (held on admission), PPI, and carafate at home. She is also on iron supplement, which may cause black stools. Stool hemo-occult was negative. Serial hemoglobins were grossly stable accounting for some dilutional effect from IV fluids (range was from 11.6 to 13.4). She had no further black tarry stools, coffee ground emesis, or other overt signs of bleeding during admission.   -Continue daily PPI and carafate QID as prior to admission.   -Continue zofran, compazine prn nausea. Consider scheduling zofran TID if nausea persists.  -Patient was instructed to call PCP, [facility], or go to nearest ER if has recurrent hematemesis, hematochezia/melena, significant lightheadedness, or other concerning symptoms.      2. Fevers:  Patient was febrile on 2/9 early morning, tmax 102.9. Fever trended down: 101.3, 100.7, 100, 99.8. Blood pressures were initially soft but then stabilized and improved back to baseline. Patient reported feeling generally unwell, malaised with phantom limb pain, nausea, and mild abdominal discomfort. She also reported sore throat with tender R submandibular LN and mild rhinorrhea. Otherwise no localizing infectious symptoms. Unclear etiology of fevers but given symptoms, suspect non-influenza viral illness. Rapid strep screen was collected but canceled due to collected in wrong container. Rapid flu and " respiratory viral PCR (flu, RSV) negative. UA unremarkable, UC negative to date. No change in UOP from suprapubic catheter. No diarrhea. Blood cultures drawn and pending. Lactate and WBC count WNL. CXR and CT abdomen unrevealing.    -Antiemetics as above.  -Recommend OTC lozenges prn sore throat.   -Patient was instructed to get plenty of fluids and rest, and to call PCP, [facility], or go to nearest ER if has recurrent fevers or worsening infectious symptoms.          Problem list and histories reviewed & adjusted, as indicated.  Additional history: as documented    Patient Active Problem List   Diagnosis     Hyperlipidemia LDL goal <100     Seizure disorder (H)     ACP (advance care planning)     Osteoporosis     Schizoaffective disorder (H)     AS (sickle cell trait) (H)     Vertigo     Person who has had sex change operation     Claudication in peripheral vascular disease (H)     Intestinal malabsorption     GIB (gastrointestinal bleeding)     Cervicalgia     Health Care Home     Asthma     Adjustment disorder with depressed mood     Chronic pain syndrome     Open-angle glaucoma     Hx of colonic polyp     Old myocardial infarction     Iron deficiency anemia     Late effect of stroke     Degeneration of intervertebral disc of lumbosacral region     Thoracic or lumbosacral neuritis or radiculitis     Cerebral infarction due to occlusion or stenosis of carotid artery     Disorder of bone and cartilage     Hereditary and idiopathic peripheral neuropathy     Androgen insensitivity syndrome     PAD (peripheral artery disease) (H)     Chronic systolic congestive heart failure (H)     Stenosis of carotid artery     Osteoarthritis     Pain in both upper extremities     Atherosclerotic peripheral vascular disease with rest pain (H)     Essential hypertension     Cellulitis of right ankle     Angina pectoris, crescendo (H)     Type 2 diabetes mellitus with diabetic peripheral angiopathy without gangrene, without  long-term current use of insulin (H)     Anemia, unspecified type     Critical lower limb ischemia     Testicular feminization     Anxiety disorder due to general medical condition     Central retinal artery occlusion     Lumbosacral radiculitis     Peripheral neuropathy     Osteopenia     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 (H)     Primary open angle glaucoma of both eyes, severe stage     Pseudophakia of right eye     Cataract, left eye     Diabetes mellitus type 2 without retinopathy (H)     Pyelonephritis     Chronic obstructive pulmonary disease, unspecified COPD type (H)     JOSE (obstructive sleep apnea)     Complex sleep apnea syndrome     Coronary artery disease of native artery of native heart with stable angina pectoris (H)     Hyperlipidemia     Ischemic cardiomyopathy     Bee sting reaction, undetermined intent, subsequent encounter     Functional incontinence     Personal history of urinary tract infection     Dysphagia     Single seizure (H)     Essential hypertension with goal blood pressure less than 130/80     Hyperlipidemia LDL goal <70     UTI (urinary tract infection)     Food impaction of esophagus     Esophageal foreign body     Nausea & vomiting     Incontinence     Black tarry stools     Past Surgical History:   Procedure Laterality Date     AMPUTATE LEG ABOVE KNEE Left 6/11/2016    Procedure: AMPUTATE LEG ABOVE KNEE;  Surgeon: Mello Rodriguez MD;  [facility]     AMPUTATE LEG BELOW KNEE Right 11/7/2016    Procedure: AMPUTATE LEG BELOW KNEE;  Surgeon: Savannah Durant MD;  [facility]     AMPUTATE REVISION STUMP LOWER EXTREMITY Right 11/11/2016    Procedure: AMPUTATE REVISION STUMP LOWER EXTREMITY;  Surgeon: Savannah Durant MD;  [facility]     AMPUTATE REVISION STUMP LOWER EXTREMITY Right 11/16/2016    Procedure: AMPUTATE REVISION STUMP LOWER EXTREMITY;  Surgeon: Savannah Durant MD;  [facility]     AMPUTATE TOE(S) Right 1/5/2016    Procedure: AMPUTATE  TOE(S);  Surgeon: Mello Gaines DPM;  Location:  [facility]     ANGIOGRAM Bilateral 11/21/2014    Procedure: ANGIOGRAM;  Surgeon: Savannah Durant MD;  [facility]     ANGIOGRAM Left 1/16/2015    Procedure: ANGIOGRAM;  Surgeon: Savannah Durant MD;  [facility]     ANGIOGRAM Bilateral 9/14/2015    Procedure: ANGIOGRAM;  Surgeon: Savannah Durant MD;  [facility]     ANGIOGRAM Left 10/12/2015    Procedure: ANGIOGRAM;  Surgeon: Savannah Durant MD;  [facility]     ANGIOGRAM Right 6/6/2016    Procedure: ANGIOGRAM;  Surgeon: Savannah Durant MD;  [facility]     ANGIOPLASTY Right 6/6/2016    Procedure: ANGIOPLASTY;  Surgeon: Savannah Durant MD;  [facility]     APPENDECTOMY       BREAST SURGERY      right breast bx (benign)     CHOLECYSTECTOMY       COLONOSCOPY N/A 8/25/2014    Procedure: COLONOSCOPY;  Surgeon: Mello Ferrer MD;  [facility]     COLONOSCOPY WITH CO2 INSUFFLATION N/A 8/20/2014    Procedure: COLONOSCOPY WITH CO2 INSUFFLATION;  Surgeon: Duane, William Charles, MD;  Location: [facility]     CYSTOSTOMY, INSERT TUBE SUPRAPUBIC, COMBINED N/A 1/16/2018    Procedure: COMBINED CYSTOSTOMY, INSERT TUBE SUPRAPUBIC;  Cystoscopy, Intraoperative Ultrasound, Suprapubic Tube Placement;  Surgeon: Keanu Dawson MD;  [facility]     ENDARTERECTOMY FEMORAL  5/23/2014    Procedure: ENDARTERECTOMY FEMORAL;  Surgeon: Jason Joshi MD;  [facility]     ESOPHAGOSCOPY, GASTROSCOPY, DUODENOSCOPY (EGD), COMBINED  12/14/2012    Procedure: COMBINED ESOPHAGOSCOPY, GASTROSCOPY, DUODENOSCOPY (EGD), BIOPSY SINGLE OR MULTIPLE;  ESOPHAGOSCOPY, GASTROSCOPY, DUODENOSCOPY (EGD), DILATATION ;  Surgeon: Elizabeth Stevenson MD;  Location:  [facility]     ESOPHAGOSCOPY, GASTROSCOPY, DUODENOSCOPY (EGD), COMBINED  12/31/2013    Procedure: COMBINED ESOPHAGOSCOPY, GASTROSCOPY, DUODENOSCOPY (EGD), BIOPSY SINGLE OR MULTIPLE;;  Surgeon: Clemente Lopez MD;  Location:  [facility]     ESOPHAGOSCOPY, GASTROSCOPY, DUODENOSCOPY (EGD),  COMBINED  4/1/2014    Procedure: COMBINED ESOPHAGOSCOPY, GASTROSCOPY, DUODENOSCOPY (EGD);;  Surgeon: Clemente Lopez MD;  [facility]     ESOPHAGOSCOPY, GASTROSCOPY, DUODENOSCOPY (EGD), COMBINED  6/28/2014    Procedure: COMBINED ESOPHAGOSCOPY, GASTROSCOPY, DUODENOSCOPY (EGD);  Surgeon: Clemente Lopez MD;  [facility]     ESOPHAGOSCOPY, GASTROSCOPY, DUODENOSCOPY (EGD), COMBINED N/A 8/20/2014    Procedure: COMBINED ESOPHAGOSCOPY, GASTROSCOPY, DUODENOSCOPY (EGD), BIOPSY SINGLE OR MULTIPLE;  Surgeon: Duane, William Charles, MD;  Location:  OR     ESOPHAGOSCOPY, GASTROSCOPY, DUODENOSCOPY (EGD), COMBINED N/A 8/22/2014    Procedure: COMBINED ESOPHAGOSCOPY, GASTROSCOPY, DUODENOSCOPY (EGD), BIOPSY SINGLE OR MULTIPLE;  Surgeon: Mello Ferrer MD;  [facility]     ESOPHAGOSCOPY, GASTROSCOPY, DUODENOSCOPY (EGD), COMBINED N/A 10/2/2014    Procedure: COMBINED ESOPHAGOSCOPY, GASTROSCOPY, DUODENOSCOPY (EGD), BIOPSY SINGLE OR MULTIPLE;  Surgeon: Remy Haskins MD;  [facility]     ESOPHAGOSCOPY, GASTROSCOPY, DUODENOSCOPY (EGD), COMBINED Left 12/15/2014    Procedure: COMBINED ESOPHAGOSCOPY, GASTROSCOPY, DUODENOSCOPY (EGD), BIOPSY SINGLE OR MULTIPLE;  Surgeon: Remy Haskins MD;  [facility]     ESOPHAGOSCOPY, GASTROSCOPY, DUODENOSCOPY (EGD), COMBINED N/A 2/25/2015    Procedure: COMBINED ENDOSCOPIC ULTRASOUND, ESOPHAGOSCOPY, GASTROSCOPY, DUODENOSCOPY (EGD), FINE NEEDLE ASPIRATE/BIOPSY;  Surgeon: Clemente Lugo MD;  [facility]     ESOPHAGOSCOPY, GASTROSCOPY, DUODENOSCOPY (EGD), COMBINED Left 2/25/2015    Procedure: COMBINED ESOPHAGOSCOPY, GASTROSCOPY, DUODENOSCOPY (EGD), BIOPSY SINGLE OR MULTIPLE;  Surgeon: Clemente Lugo MD;  [facility]     ESOPHAGOSCOPY, GASTROSCOPY, DUODENOSCOPY (EGD), COMBINED N/A 9/25/2016    Procedure: COMBINED ESOPHAGOSCOPY, GASTROSCOPY, DUODENOSCOPY (EGD);  Surgeon: Aziza Patiño MD;  Location:  [facility]     ESOPHAGOSCOPY, GASTROSCOPY, DUODENOSCOPY  (EGD), COMBINED N/A 1/18/2017    Procedure: COMBINED ESOPHAGOSCOPY, GASTROSCOPY, DUODENOSCOPY (EGD), BIOPSY SINGLE OR MULTIPLE;  Surgeon: Clemente Lopez MD;  [facility]     ESOPHAGOSCOPY, GASTROSCOPY, DUODENOSCOPY (EGD), COMBINED N/A 11/26/2017    Procedure: COMBINED ESOPHAGOSCOPY, GASTROSCOPY, DUODENOSCOPY (EGD), REMOVE FOREIGN BODY;  Esophagogastroduodenoscopy with foreign body extraction  ;  Surgeon: Herberth Castrejon MD;  [facility]     ESOPHAGOSCOPY, GASTROSCOPY, DUODENOSCOPY (EGD), COMBINED N/A 11/26/2017    Procedure: COMBINED ESOPHAGOSCOPY, GASTROSCOPY, DUODENOSCOPY (EGD), REMOVE FOREIGN BODY;;  Surgeon: Herberth Castrejon MD;  [facility]     FASCIOTOMY LOWER EXTREMITY Left 6/10/2016    Procedure: FASCIOTOMY LOWER EXTREMITY;  Surgeon: Mello Rodriguez MD;  [facility]     HC CAPSULE ENDOSCOPY N/A 8/25/2014    Procedure: CAPSULE/PILL CAM ENDOSCOPY;  Surgeon: Remy Haskins MD;  [facility]     HC CAPSULE ENDOSCOPY N/A 10/2/2014    Procedure: CAPSULE/PILL CAM ENDOSCOPY;  Surgeon: Remy Haskins MD;  [facility]     ORTHOPEDIC SURGERY      broken wrist repair     SEX TRANSFORMATION SURGERY, MALE TO FEMALE      1974     SINUS SURGERY      cyst removed     TONSILLECTOMY       VASCULAR SURGERY      Left carotid stent       Social History   Substance Use Topics     Smoking status: Former Smoker     Packs/day: 2.50     Years: 30.00     Types: Cigarettes, Cigars     Quit date: 11/1/2001     Smokeless tobacco: Never Used     Alcohol use No     Family History   Problem Relation Age of Onset     Dementia Mother      Glaucoma Mother      DIABETES Mother      may have been type 1, childhood     Coronary Artery Disease Mother      MI     Glaucoma Father      DIABETES Father      may hev been type 1 - ??     Heart Failure Father      CANCER Maternal Aunt      leukemia     Schizophrenia Brother      Depression Brother      Suicide Sister      Depression Sister      DIABETES  Sister      CANCER Maternal Aunt      ovarian     Glaucoma Maternal Grandmother      DIABETES Maternal Grandmother      Glaucoma Maternal Grandfather      DIABETES Maternal Grandfather      Glaucoma Paternal Grandmother      DIABETES Paternal Grandmother      Glaucoma Paternal Grandfather      DIABETES Paternal Grandfather      Breast Cancer Sister      CEREBROVASCULAR DISEASE Brother      Colon Cancer No family hx of          Current Outpatient Prescriptions   Medication Sig Dispense Refill     ondansetron (ZOFRAN-ODT) 8 MG ODT tab Take 1 tablet (8 mg) by mouth every 8 hours as needed for nausea or vomiting 30 tablet 2     brimonidine (ALPHAGAN) 0.2 % ophthalmic solution Place 1 drop into the right eye 2 times daily       pantoprazole (PROTONIX) 40 MG EC tablet Take 1 tablet (40 mg) by mouth daily       HYDROMORPHONE HCL PO Take 2 mg by mouth every 3 hours as needed for moderate to severe pain       escitalopram (LEXAPRO) 10 MG tablet Take 10 mg by mouth daily        traZODone (DESYREL) 50 MG tablet Take 150 mg by mouth At Bedtime        lactulose (CHRONULAC) 10 GM/15ML solution Take 20 g by mouth as needed for constipation       Prochlorperazine Maleate (COMPAZINE PO) Take 10 mg by mouth every 8 hours as needed for nausea       ESTRACE VAGINAL 0.1 MG/GM cream USE DIME SIZE AMOUNT 3X A WEEK AT NIGHT 42.5 g 11     metFORMIN (GLUCOPHAGE-XR) 500 MG 24 hr tablet Take 1 tablet (500 mg) by mouth daily (with dinner) 90 tablet 3     sucralfate (CARAFATE) 1 GM tablet Take 1 tablet (1 g) by mouth 4 times daily May dissolve in 10 mL water is necessary. (Start upon completion of carafate suspension) 120 tablet 11     umeclidinium (INCRUSE ELLIPTA) 62.5 MCG/INH oral inhaler Inhale 1 puff into the lungs daily       lidocaine (LIDODERM) 5 % Patch APPLY 1 TO 3 PATCHES TO PAINFUL AREA AT ONCE FOR UP TO 12 HOURS WITHIN A 24 HOUR PERIOD REMOVE AFTER 12 HOURS 30 patch 5     blood glucose monitoring (ONE TOUCH ULTRASOFT) lancets Use to  test blood sugar 3 times daily or as directed. 100 each PRN     blood glucose monitoring (ONE TOUCH ULTRA) test strip Use to test blood sugars 3 times daily or as directed. 3 Box 3     metoprolol (TOPROL-XL) 25 MG 24 hr tablet TAKE 1 TABLET (25 MG) BY MOUTH DAILY 30 tablet 10     order for DME Full electric hospital bed with half rails    Dx: D19476, I110, J449  Length of need: lifetime 1 Device 0     order for DME Wheel Chair Cushion: 18 x 18 inch Roho cushion 1 Device 0     order for DME Hospital bed with side rails 1 Device 0     polyethylene glycol (MIRALAX/GLYCOLAX) Packet Take 17 g by mouth daily Dissolved in water or juice        ACETAMINOPHEN PO Take 1,000 mg by mouth every 4 hours as needed for fever Not to exceed 4000 mg/day       pregabalin (LYRICA) 75 MG capsule Take 2 capsules (150 mg) by mouth 2 times daily 120 capsule 5     cetirizine (ZYRTEC) 10 MG tablet Take 1 tablet (10 mg) by mouth daily as needed for allergies 90 tablet 3     diclofenac (VOLTAREN) 1 % GEL topical gel Apply 2 g topically 4 times daily to hands 100 g 11     EPINEPHrine (EPIPEN JR) 0.15 MG/0.3ML injection Inject 0.3 mLs (0.15 mg) into the muscle as needed for anaphylaxis 0.6 mL 1     lisinopril (PRINIVIL/ZESTRIL) 10 MG tablet Take 1 tablet (10 mg) by mouth daily 90 tablet 3     risperiDONE (RISPERDAL) 0.5 MG tablet Take 0.5 mg by mouth At Bedtime       ferrous sulfate (IRON) 325 (65 FE) MG tablet Take 1 tablet (325 mg) by mouth 2 times daily With meals 60 tablet 2     order for DME Equipment being ordered: CPAP supplies mask, hose, filters, cushion    fax to [facility] at [phone] 10 Device prn     order for DME Equipment being ordered: CPAP supplies mask, hose, filters, cushion fax to [facility] at [phone]  Disp: 10 Device Refills: prn   Class: Local Print Start: 2/10/2017 1 Device 0     order for DME Equipment being ordered: Nebulizer and tubing supplies 1 Units 0     albuterol (2.5 MG/3ML) 0.083% neb solution  INHALE 1 VIAL VIA NEBULIZER EVERY 6 HOURS AS NEEDED 360 mL 11     CYANOCOBALAMIN PO Take 2,000 mcg by mouth daily       fluticasone (FLONASE) 50 MCG/ACT nasal spray Spray 1 spray into both nostrils daily       ADVAIR DISKUS 250-50 MCG/DOSE diskus inhaler Inhale 1 puff into the lungs 2 times daily        calcium citrate-vitamin D (CITRACAL) 315-250 MG-UNIT TABS Take 2 tablets by mouth daily 120 tablet 5     hydrochlorothiazide (MICROZIDE) 12.5 MG capsule Take 1 capsule (12.5 mg) by mouth daily (Patient taking differently: Take 12.5 mg by mouth daily Hold for sbp<90) 90 capsule 3     estradiol (ESTRACE) 1 MG tablet Take 1 tablet (1 mg) by mouth daily 90 tablet 3     levETIRAcetam (KEPPRA) 500 MG tablet Take 1 tablet (500 mg) by mouth 2 times daily 180 tablet 1     aspirin EC 81 MG EC tablet Take 1 tablet (81 mg) by mouth daily 90 tablet 3     blood glucose monitoring (ONE TOUCH ULTRA 2) meter device kit Use to test blood sugars 3 times daily or as directed. 1 kit 0     phenytoin 200 MG CAPS Take 200 mg by mouth 2 times daily (Patient taking differently: Take 200 mg by mouth 2 times daily (takes at 8 AM and 8 PM)) 60 capsule 0     dorzolamide (TRUSOPT) 2 % ophthalmic solution Place 1 drop into the right eye 2 times daily        zinc 50 MG TABS Take 1 tablet by mouth daily       nitroglycerin (NITROSTAT) 0.4 MG SL tablet Place 1 tablet (0.4 mg) under the tongue every 5 minutes as needed for chest pain if you are still having symptoms after 3 doses (15 minutes) call 911. 25 tablet 1     MEDICATION GIVEN BY INTRATHECAL PUMP - INSTRUCTION continuous 2/8/18: Pump managed by [facility] in [place] [phone].   Conc: Bupivacaine 20 mg/mL and Fentanyl 2000 mcg/mL, morphine 2 mg/mL  Continuous:  Fentanyl 796 mcg/day, Bupivacaine 7.96 mg/day, Morphine 0.796 mg/day   Boluses: Up to 7 boluses per 24-hr period of Bupivicaine 0.5994 mg and Fentanyl 59.9 mcg morphine 0.0599 mg.  Pump Refill Date  02/28/18       latanoprost (XALATAN) 0.005 % ophthalmic solution Place 1 drop into both eyes At Bedtime 2.5 mL 11     atorvastatin (LIPITOR) 40 MG tablet Take 1 tablet (40 mg) by mouth daily (Patient taking differently: Take 40 mg by mouth At Bedtime ) 90 tablet 3     order for DME Equipment being ordered: Glucerna daily shakes with each meal 1 Box 11     ORDER FOR DME Equipment being ordered: Power Wheelchair 1 Device 0     ORDER FOR DME Equipment being ordered: Depends briefs 1 Month 11     Cholecalciferol (VITAMIN D) 2000 UNITS tablet Take 2,000 Units by mouth daily. 100 tablet 3     Multiple Vitamin (MULTIVITAMIN OR) Take 1 tablet by mouth daily        Allergies   Allergen Reactions     Bee Venom      Penicillins Anaphylaxis     Other reaction(s): Skin Rash and/or Hives     Dilantin [Phenytoin] Other (See Comments)     Generic dilantin only per pt     Iodide Hives     09/11/15: Pt states she can use iodine and doesn't have any problems      Iodine-131      Novocaine [Procaine] Hives     Other reaction(s): Skin Rash and/or Hives     Tositumomab      BP Readings from Last 3 Encounters:   02/13/18 98/56   02/09/18 121/65   01/31/18 100/54    Wt Readings from Last 3 Encounters:   02/13/18 123 lb (55.8 kg)   01/31/18 123 lb (55.8 kg)   01/04/18 123 lb (55.8 kg)                    Reviewed and updated as needed this visit by clinical staff  Tobacco  Allergies  Meds  Med Hx  Surg Hx  Fam Hx  Soc Hx      Reviewed and updated as needed this visit by Provider         ROS:  C: NEGATIVE for chills, change in weight  E/M: NEGATIVE for ear, mouth and throat problems  CV: NEGATIVE for chest pain, palpitations or peripheral edema  GI: NEGATIVE for nausea, abdominal pain, heartburn, or change in bowel habits  : NEGATIVE for frequency, dysuria, or hematuria  M: NEGATIVE for significant arthralgias or myalgia  N: NEGATIVE for weakness, dizziness or paresthesias  P: NEGATIVE for changes in mood or affect    OBJECTIVE:                                                     /56  Pulse 78  Temp 98.6  F (37  C) (Oral)  Wt 123 lb (55.8 kg)  SpO2 94%  BMI 19.26 kg/m2  Body mass index is 19.26 kg/(m^2).  GENERAL: alert and no distress in wheelchair  EYES: Eyes grossly normal to inspection, extraocular movements - intact, and PERRL  HENT: ear canals- normal; TMs- normal; Nose- normal; Mouth- no ulcers, no lesions  NECK: no tenderness, no adenopathy, no asymmetry, no masses, no stiffness; thyroid- normal to palpation  RESP: overall lungs clear to auscultation  CV: regular rates and rhythm, normal S1 S2 and no click or rub   MS: bilateral AKA's  PSYCH: Alert and oriented times 3; speech- coherent , normal rate and volume; able to articulate logical thoughts, able to abstract reason, no tangential thoughts, no hallucinations or delusions, affect- normal       ASSESSMENT/PLAN:                                                      (Z09) Hospital discharge follow-up  (primary encounter diagnosis)  Comment: Patient appears to be stable with no acute change from baseline lesser mild URI symptoms as defined below per  Plan:     (K92.1) Black tarry stools  Comment: She reports resolution of her stool changes which more likely were related to potentially her iron supplementation.  Plan: Is continuing with her PPI therapy as ordered    (J06.9) Upper respiratory tract infection, unspecified type  Comment: Patient had symptoms in the emergency room and hospital which appears to have been evaluated peripherally.  She is at significant risk for secondary infection due to age and comorbid factors thus with her discolored productive sputum that appears to have increased I recommended an empiric course of Levaquin 500 mg daily ×7 days per  Plan: levofloxacin (LEVAQUIN) 500 MG tablet            (G89.4) Chronic pain syndrome  Comment: Lidoderm patches were refilled upon request  Plan: lidocaine (LIDODERM) 5 % Patch            (Z89.511) Status post below knee  amputation of right lower extremity (H)  Comment: Stable with a mild pain using wheelchair for ambulation continue with home care  Plan: lidocaine (LIDODERM) 5 % Patch          See Patient Instructions    Allan Casey MD  [facility]

## 2018-02-13 NOTE — MR AVS SNAPSHOT
After Visit Summary   2/13/2018    Sonya Foote    MRN: [id number]           Patient Information     Date Of Birth          1950        Visit Information        Provider Department      2/13/2018 10:00 AM Allan Casey MD [facility]        Today's Diagnoses     Hospital discharge follow-up    -  1    Black tarry stools        Upper respiratory tract infection, unspecified type        Chronic pain syndrome        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 (H)           Follow-ups after your visit        Follow-up notes from your care team     Return if symptoms worsen or fail to improve.      Your next 10 appointments already scheduled     Feb 19, 2018  9:30 AM CST   (Arrive by 9:15 AM)   Return Visit with Alina Jennings MD   [facility] ([facility])    [address] [address] [phone]            Mar 06, 2018  9:15 AM CST   VISUAL FIELD with [facility] EYE VISUAL FIELD   Eye Clinic ([facility])    [facility]  [address] [place] [address]  [facility]  [facility] [address]   [phone]            Mar 06, 2018  9:45 AM CST   RETURN GLAUCOMA with Marely Robin MD   Eye Clinic ([facility])    [facility] [address]  [facility]  [facility] [address]   [phone]            May 11, 2018 10:50 AM CDT   (Arrive by 10:35 AM)   RETURN DIABETES with Michelle Irizarry MD   [facility] Endocrinology ([facility])    [address] [place] [address]  3rd [facility] [id number] [phone]              Who to contact     If you have questions or need follow up information about today's clinic visit or your schedule please contact [facility] directly at [phone].  Normal or non-critical lab and imaging results will be communicated to you by  "MyChart, letter or phone within 4 business days after the clinic has received the results. If you do not hear from us within 7 days, please contact the clinic through [facility] [employer] or phone. If you have a critical or abnormal lab result, we will notify you by phone as soon as possible.  Submit refill requests through [employer] or call your pharmacy and they will forward the refill request to us. Please allow 3 business days for your refill to be completed.          Additional Information About Your Visit        [facility] [email or web address] Information     [employer] lets you send messages to your doctor, view your test results, renew your prescriptions, schedule appointments and more. To sign up, go to [email or web address] . Click on \"Log in\" on the left side of the screen, which will take you to the Welcome page. Then click on \"Sign up Now\" on the right side of the page.     You will be asked to enter the access code listed below, as well as some personal information. Please follow the directions to create your username and password.     Your access code is: [email or web address]  Expires: 2018  9:57 AM     Your access code will  in 90 days. If you need help or a new code, please call your [place] clinic or [phone].        Care EveryWhere ID     This is your Care EveryWhere ID. This could be used by other organizations to access your [place] medical records  [id number]        Your Vitals Were     Pulse Temperature Pulse Oximetry BMI (Body Mass Index)          78 98.6  F (37  C) (Oral) 94% 19.26 kg/m2         Blood Pressure from Last 3 Encounters:   18 108/56   18 121/65   18 100/54    Weight from Last 3 Encounters:   18 123 lb (55.8 kg)   18 123 lb (55.8 kg)   18 123 lb (55.8 kg)              Today, you had the following     No orders found for display         Today's Medication Changes          These changes are accurate as of 18 10:07 AM.  If you have any questions, ask your nurse or " doctor.               Start taking these medicines.        Dose/Directions    levofloxacin 500 MG tablet   Commonly known as:  LEVAQUIN   Used for:  Upper respiratory tract infection, unspecified type   Started by:  Allan Casey MD        Dose:  500 mg   Take 1 tablet (500 mg) by mouth daily   Quantity:  7 tablet   Refills:  0         These medicines have changed or have updated prescriptions.        Dose/Directions    atorvastatin 40 MG tablet   Commonly known as:  LIPITOR   This may have changed:  when to take this   Used for:  Hyperlipidemia LDL goal <100        Dose:  40 mg   Take 1 tablet (40 mg) by mouth daily   Quantity:  90 tablet   Refills:  3       hydrochlorothiazide 12.5 MG capsule   Commonly known as:  MICROZIDE   This may have changed:  additional instructions   Used for:  Essential hypertension with goal blood pressure less than 130/80        Dose:  12.5 mg   Take 1 capsule (12.5 mg) by mouth daily   Quantity:  90 capsule   Refills:  3       Phenytoin Sodium Extended 200 MG Caps   This may have changed:  additional instructions   Used for:  Seizure disorder (H)        Dose:  200 mg   Take 200 mg by mouth 2 times daily   Quantity:  60 capsule   Refills:  0            Where to get your medicines      These medications were sent to [address] [address]     Phone:  [phone]     levofloxacin 500 MG tablet    lidocaine 5 % Patch                Primary Care Provider Office Phone # Fax #    Allan Casey [phone]       [address] [place] [id number]        Equal Access to Services     [facility]: Hadii shaheen youo Sojoe, waaxda luqadaha, qaybta kaalmada adeegyada, tonie marroquin. So [facility] [phone].    ATENCIÓN: Si habla español, tiene a briones disposición servicios gratuitos de asistencia lingüística. Llame al [phone].    We comply with applicable  federal civil rights laws and [place] laws. We do not discriminate on the basis of race, color, national origin, age, disability, sex, sexual orientation, or gender identity.            Thank you!     Thank you for choosing [facility]  for your care. Our goal is always to provide you with excellent care. Hearing back from our patients is one way we can continue to improve our services. Please take a few minutes to complete the written survey that you may receive in the mail after your visit with us. Thank you!             Your Updated Medication List - Protect others around you: Learn how to safely use, store and throw away your medicines at www.disposemymeds.org.          This list is accurate as of 2/13/18 10:07 AM.  Always use your most recent med list.                   Brand Name Dispense Instructions for use Diagnosis    ACETAMINOPHEN PO      Take 1,000 mg by mouth every 4 hours as needed for fever Not to exceed 4000 mg/day        ADVAIR DISKUS 250-50 MCG/DOSE diskus inhaler   Generic drug:  fluticasone-salmeterol      Inhale 1 puff into the lungs 2 times daily        albuterol (2.5 MG/3ML) 0.083% neb solution     360 mL    INHALE 1 VIAL VIA NEBULIZER EVERY 6 HOURS AS NEEDED    Chronic obstructive pulmonary disease, unspecified COPD type (H)       aspirin 81 MG EC tablet     90 tablet    Take 1 tablet (81 mg) by mouth daily    Unstable angina (H)       atorvastatin 40 MG tablet    LIPITOR    90 tablet    Take 1 tablet (40 mg) by mouth daily    Hyperlipidemia LDL goal <100       blood glucose monitoring lancets     100 each    Use to test blood sugar 3 times daily or as directed.    Type 2 diabetes mellitus with diabetic peripheral angiopathy without gangrene, without long-term current use of insulin (H)       blood glucose monitoring meter device kit     1 kit    Use to test blood sugars 3 times daily or as directed.    Type 2 diabetes, HbA1C goal < 8% (H)       blood glucose  monitoring test strip    ONETOUCH ULTRA    3 Box    Use to test blood sugars 3 times daily or as directed.    Type 2 diabetes mellitus with diabetic peripheral angiopathy without gangrene, without long-term current use of insulin (H)       brimonidine 0.2 % ophthalmic solution    ALPHAGAN     Place 1 drop into the right eye 2 times daily    Primary open angle glaucoma of both eyes, severe stage       calcium citrate-vitamin D 315-250 MG-UNIT Tabs per tablet    CITRACAL    120 tablet    Take 2 tablets by mouth daily    Osteoporosis       cetirizine 10 MG tablet    zyrTEC    90 tablet    Take 1 tablet (10 mg) by mouth daily as needed for allergies    Seasonal allergic rhinitis, unspecified allergic rhinitis trigger       COMPAZINE PO      Take 10 mg by mouth every 8 hours as needed for nausea        CYANOCOBALAMIN PO      Take 2,000 mcg by mouth daily        diclofenac 1 % Gel topical gel    VOLTAREN    100 g    Apply 2 g topically 4 times daily to hands    Primary osteoarthritis of both hands       dorzolamide 2 % ophthalmic solution    TRUSOPT     Place 1 drop into the right eye 2 times daily        EPINEPHrine 0.15 MG/0.3ML injection 2-pack    EPIPEN JR    0.6 mL    Inject 0.3 mLs (0.15 mg) into the muscle as needed for anaphylaxis    Bee sting reaction, undetermined intent, subsequent encounter       escitalopram 10 MG tablet    LEXAPRO     Take 10 mg by mouth daily        ESTRACE VAGINAL 0.1 MG/GM cream   Generic drug:  estradiol     42.5 g    USE DIME SIZE AMOUNT 3X A WEEK AT NIGHT    Atrophic vaginitis       estradiol 1 MG tablet    ESTRACE    90 tablet    Take 1 tablet (1 mg) by mouth daily    Person who has had sex change operation       ferrous sulfate 325 (65 FE) MG tablet    IRON    60 tablet    Take 1 tablet (325 mg) by mouth 2 times daily With meals        fluticasone 50 MCG/ACT spray    FLONASE     Spray 1 spray into both nostrils daily        hydrochlorothiazide 12.5 MG capsule    MICROZIDE    90  capsule    Take 1 capsule (12.5 mg) by mouth daily    Essential hypertension with goal blood pressure less than 130/80       HYDROMORPHONE HCL PO      Take 2 mg by mouth every 3 hours as needed for moderate to severe pain        INCRUSE ELLIPTA 62.5 MCG/INH oral inhaler   Generic drug:  umeclidinium      Inhale 1 puff into the lungs daily        lactulose 10 GM/15ML solution    CHRONULAC     Take 20 g by mouth as needed for constipation        latanoprost 0.005 % ophthalmic solution    XALATAN    2.5 mL    Place 1 drop into both eyes At Bedtime    Primary open angle glaucoma, stage unspecified       levETIRAcetam 500 MG tablet    KEPPRA    180 tablet    Take 1 tablet (500 mg) by mouth 2 times daily    Nausea       levofloxacin 500 MG tablet    LEVAQUIN    7 tablet    Take 1 tablet (500 mg) by mouth daily    Upper respiratory tract infection, unspecified type       lidocaine 5 % Patch    LIDODERM    30 patch    APPLY 1 TO 3 PATCHES TO PAINFUL AREA AT ONCE FOR UP TO 12 HOURS WITHIN A 24 HOUR PERIOD REMOVE AFTER 12 HOURS    Chronic pain syndrome, 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 (H)       lisinopril 10 MG tablet    PRINIVIL/ZESTRIL    90 tablet    Take 1 tablet (10 mg) by mouth daily    Essential hypertension with goal blood pressure less than 130/80       MEDICATION GIVEN BY INTRATHECAL PUMP - INSTRUCTION      continuous 2/8/18: Pump managed by [facility] in [place] [phone].  Conc: Bupivacaine 20 mg/mL and Fentanyl 2000 mcg/mL, morphine 2 mg/mL Continuous:  Fentanyl 796 mcg/day, Bupivacaine 7.96 mg/day, Morphine 0.796 mg/day  Boluses: Up to 7 boluses per 24-hr period of Bupivicaine 0.5994 mg and Fentanyl 59.9 mcg morphine 0.0599 mg. Pump Refill Date 02/28/18        metFORMIN 500 MG 24 hr tablet    GLUCOPHAGE-XR    90 tablet    Take 1 tablet (500 mg) by mouth daily (with dinner)    Type 2 diabetes mellitus with diabetic peripheral angiopathy and gangrene,  without long-term current use of insulin (H)       metoprolol succinate 25 MG 24 hr tablet    TOPROL-XL    30 tablet    TAKE 1 TABLET (25 MG) BY MOUTH DAILY    Old myocardial infarction       MULTIVITAMIN PO      Take 1 tablet by mouth daily        nitroGLYcerin 0.4 MG sublingual tablet    NITROSTAT    25 tablet    Place 1 tablet (0.4 mg) under the tongue every 5 minutes as needed for chest pain if you are still having symptoms after 3 doses (15 minutes) call 911.    Chronic systolic congestive heart failure (H), Old myocardial infarction       ondansetron 8 MG ODT tab    ZOFRAN-ODT    30 tablet    Take 1 tablet (8 mg) by mouth every 8 hours as needed for nausea or vomiting    Non-intractable vomiting with nausea, unspecified vomiting type       * order for DME     1 Month    Equipment being ordered: Depends briefs    Incontinence       * order for DME     1 Device    Equipment being ordered: Power Wheelchair    CVA (cerebral infarction), HTN (hypertension)       * order for DME     1 Box    Equipment being ordered: Glucerna daily shakes with each meal    Type 2 diabetes mellitus with other diabetic neurological complication       * order for DME     1 Units    Equipment being ordered: Nebulizer and tubing supplies    Simple chronic bronchitis (H)       * order for DME     1 Device    Equipment being ordered: CPAP supplies mask, hose, filters, cushion fax to [facility] at [phone] Disp: 10 DeviceRefills: prn Class: Local PrintStart: 2/10/2017    Chronic obstructive pulmonary disease, unspecified COPD type (H)       * order for DME     10 Device    Equipment being ordered: CPAP supplies mask, hose, filters, cushion  fax to [facility] at [phone]    COPD (chronic obstructive pulmonary disease) (H)       * order for DME     1 Device    Hospital bed with side rails    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 (H)       * order for DME     1 Device    Full electric hospital bed with half  [facility]  Dx: P45593, I110, J449 Length of need: lifetime    Status post bilateral above knee amputation (H)       * order for DME     1 Device    Wheel Chair Cushion: 18 x 18 inch Roho cushion    Status post bilateral above knee amputation (H)       pantoprazole 40 MG EC tablet    PROTONIX     Take 1 tablet (40 mg) by mouth daily    Gastroesophageal reflux disease without esophagitis       Phenytoin Sodium Extended 200 MG Caps     60 capsule    Take 200 mg by mouth 2 times daily    Seizure disorder (H)       polyethylene glycol Packet    MIRALAX/GLYCOLAX     Take 17 g by mouth daily Dissolved in water or juice        pregabalin 75 MG capsule    LYRICA    120 capsule    Take 2 capsules (150 mg) by mouth 2 times daily    Pain in both upper extremities       risperiDONE 0.5 MG tablet    risperDAL     Take 0.5 mg by mouth At Bedtime        sucralfate 1 GM tablet    CARAFATE    120 tablet    Take 1 tablet (1 g) by mouth 4 times daily May dissolve in 10 mL water is necessary. (Start upon completion of carafate suspension)    Adjustment disorder with depressed mood       traZODone 50 MG tablet    DESYREL     Take 150 mg by mouth At Bedtime        vitamin D 2000 UNITS tablet     100 tablet    Take 2,000 Units by mouth daily.        zinc 50 MG Tabs      Take 1 tablet by mouth daily        * Notice:  This list has 9 medication(s) that are the same as other medications prescribed for you. Read the directions carefully, and ask your doctor or other care provider to review them with you.

## 2018-02-13 NOTE — NURSING NOTE
"Chief Complaint   Patient presents with     [facility] F/U       Initial BP 98/56  Pulse 78  Temp 98.6  F (37  C) (Oral)  Wt 123 lb (55.8 kg)  SpO2 94%  BMI 19.26 kg/m2 Estimated body mass index is 19.26 kg/(m^2) as calculated from the following:    Height as of 10/18/17: 5' 7\" (1.702 m).    Weight as of this encounter: 123 lb (55.8 kg).  Medication Reconciliation: complete   Daniela Hancock CMA      "

## 2018-02-14 ENCOUNTER — TELEPHONE (OUTPATIENT)
Dept: INTERNAL MEDICINE | Facility: CLINIC | Age: 68
End: 2018-02-14

## 2018-02-14 ENCOUNTER — CARE COORDINATION (OUTPATIENT)
Dept: GERIATRIC MEDICINE | Facility: CLINIC | Age: 68
End: 2018-02-14

## 2018-02-14 DIAGNOSIS — M79.602 PAIN IN BOTH UPPER EXTREMITIES: ICD-10-CM

## 2018-02-14 DIAGNOSIS — M79.601 PAIN IN BOTH UPPER EXTREMITIES: ICD-10-CM

## 2018-02-14 RX ORDER — PREGABALIN 75 MG/1
150 CAPSULE ORAL 2 TIMES DAILY
Qty: 120 CAPSULE | Refills: 5 | Status: SHIPPED | OUTPATIENT
Start: 2018-02-14 | End: 2019-04-23

## 2018-02-14 NOTE — PROGRESS NOTES
CM received call from member that her hand gloves/mirror for w/c were delivered.  The talking clock has not been delivered yet at this time.    Member will let CM know when delivered.  CM notified [facility] OT, Fátima.     Jamie Sharma RN, N  [employer] [facility] [employer]

## 2018-02-14 NOTE — TELEPHONE ENCOUNTER
pregabalin (LYRICA) 75 MG capsule   Last Written Prescription Date:  5/25/17  Last Fill Quantity: 120,   # refills: 5  Last Office Visit: 2/13/18   Future Office visit:       Routing refill request to provider for review/approval because:  Drug not on the [facility], P or [facility] refill protocol or controlled substance

## 2018-02-14 NOTE — TELEPHONE ENCOUNTER
Reason for Call:  Medication or medication refill:    Do you use a [facility]?  Name of the pharmacy and phone number for the current request:  [facility]  [phone] and fx# [phone]    Name of the medication requested: Lyrica    Other request: A new script is needed.    Can we leave a detailed message on this number? YES    Phone number patient can be reached at: Other phone number:  [phone] Lg at [facility]  Best Time: anytime    Call taken on 2/14/2018 at 12:25 PM by DUKE FOSTER

## 2018-02-14 NOTE — PROGRESS NOTES
2/9/18: Per review in Epic, member kept under observation one night - will be d/c home today.   Spoke with member - d/c home.      Jamie Sharma RN, N  [employer]

## 2018-02-15 NOTE — PROGRESS NOTES
Member called CM to notify that talking clock was delivered today.  CM notified ULISES Shine with [facility] that both DME items were delivered.     Jamie Sharma RN, N  [facility]

## 2018-02-15 NOTE — PROGRESS NOTES
Per [facility] - CM Sonya Richard 1950 voiced activated desk clock; rear view mirror wristbands x2 1/24/2018 Delivered 2/9/18     Irene Gary  Case Management Specialist   [facility]   [phone]

## 2018-02-19 ENCOUNTER — RADIANT APPOINTMENT (OUTPATIENT)
Dept: GENERAL RADIOLOGY | Facility: CLINIC | Age: 68
End: 2018-02-19
Payer: COMMERCIAL

## 2018-02-19 ENCOUNTER — OFFICE VISIT (OUTPATIENT)
Dept: PULMONOLOGY | Facility: CLINIC | Age: 68
End: 2018-02-19
Attending: INTERNAL MEDICINE
Payer: COMMERCIAL

## 2018-02-19 VITALS
DIASTOLIC BLOOD PRESSURE: 83 MMHG | HEART RATE: 72 BPM | RESPIRATION RATE: 16 BRPM | OXYGEN SATURATION: 97 % | SYSTOLIC BLOOD PRESSURE: 127 MMHG

## 2018-02-19 DIAGNOSIS — J20.9 ACUTE BRONCHITIS, UNSPECIFIED ORGANISM: ICD-10-CM

## 2018-02-19 DIAGNOSIS — J20.9 ACUTE BRONCHITIS, UNSPECIFIED ORGANISM: Primary | ICD-10-CM

## 2018-02-19 LAB
BASOPHILS # BLD AUTO: 0 10E9/L (ref 0–0.2)
BASOPHILS NFR BLD AUTO: 0.2 %
DIFFERENTIAL METHOD BLD: ABNORMAL
EOSINOPHIL # BLD AUTO: 0.1 10E9/L (ref 0–0.7)
EOSINOPHIL NFR BLD AUTO: 1 %
ERYTHROCYTE [DISTWIDTH] IN BLOOD BY AUTOMATED COUNT: 15.7 % (ref 10–15)
HCT VFR BLD AUTO: 38 % (ref 35–47)
HGB BLD-MCNC: 12.7 G/DL (ref 11.7–15.7)
IMM GRANULOCYTES # BLD: 0 10E9/L (ref 0–0.4)
IMM GRANULOCYTES NFR BLD: 0.5 %
LYMPHOCYTES # BLD AUTO: 1.9 10E9/L (ref 0.8–5.3)
LYMPHOCYTES NFR BLD AUTO: 23.3 %
MCH RBC QN AUTO: 27.7 PG (ref 26.5–33)
MCHC RBC AUTO-ENTMCNC: 33.4 G/DL (ref 31.5–36.5)
MCV RBC AUTO: 83 FL (ref 78–100)
MONOCYTES # BLD AUTO: 0.8 10E9/L (ref 0–1.3)
MONOCYTES NFR BLD AUTO: 9.3 %
NEUTROPHILS # BLD AUTO: 5.4 10E9/L (ref 1.6–8.3)
NEUTROPHILS NFR BLD AUTO: 65.7 %
NRBC # BLD AUTO: 0 10*3/UL
NRBC BLD AUTO-RTO: 0 /100
PLATELET # BLD AUTO: 311 10E9/L (ref 150–450)
RBC # BLD AUTO: 4.58 10E12/L (ref 3.8–5.2)
WBC # BLD AUTO: 8.2 10E9/L (ref 4–11)

## 2018-02-19 PROCEDURE — 85025 COMPLETE CBC W/AUTO DIFF WBC: CPT | Performed by: INTERNAL MEDICINE

## 2018-02-19 PROCEDURE — 36415 COLL VENOUS BLD VENIPUNCTURE: CPT | Performed by: INTERNAL MEDICINE

## 2018-02-19 PROCEDURE — G0463 HOSPITAL OUTPT CLINIC VISIT: HCPCS | Mod: ZF

## 2018-02-19 RX ORDER — AZITHROMYCIN 250 MG/1
250 TABLET, FILM COATED ORAL DAILY
Qty: 6 TABLET | Refills: 0 | Status: SHIPPED | OUTPATIENT
Start: 2018-02-19 | End: 2018-02-19

## 2018-02-19 RX ORDER — AZITHROMYCIN 250 MG/1
250 TABLET, FILM COATED ORAL DAILY
Qty: 6 TABLET | Refills: 0 | Status: SHIPPED | OUTPATIENT
Start: 2018-02-19 | End: 2018-03-20

## 2018-02-19 RX ORDER — PREDNISONE 20 MG/1
40 TABLET ORAL DAILY
Qty: 10 TABLET | Refills: 0 | Status: SHIPPED | OUTPATIENT
Start: 2018-02-19 | End: 2018-03-20

## 2018-02-19 RX ORDER — PREDNISONE 20 MG/1
40 TABLET ORAL DAILY
Qty: 10 TABLET | Refills: 0 | Status: SHIPPED | OUTPATIENT
Start: 2018-02-19 | End: 2018-02-19

## 2018-02-19 ASSESSMENT — PAIN SCALES - GENERAL: PAINLEVEL: MODERATE PAIN (4)

## 2018-02-19 NOTE — LETTER
2/19/2018       RE: Sonya Foote  [address]  [place] [address]     Dear Colleague,    Thank you for referring your patient, Sonya Foote, to the [facility] at [facility]. Please see a copy of my visit note below.    Service Date: 02/19/2018      CHIEF COMPLAINT:  Acute bronchitis.      HISTORY OF PRESENT ILLNESS:  The patient is a 68-year-old woman known to me from previous visits.  She has several recent hospitalizations with upper GI bleeding.  She has a clinical diagnosis of chronic bronchitis with normal spirometry and chronically taking a long-acting muscarinic antagonist.  She says that she has been noticing symptoms of bronchitis for the last week.  They started in the hospital and got worse after leaving.  She followed up with her primary care provider and was given a prescription for levofloxacin, but she felt like she has had no significant improvement in her breathing.  She says that she has a cough that is productive of yellow and green mucus.  On the first day that she had symptoms, she did cough up a small amount of blood and says that it stopped since then.  She has significant chest pain associated with cough.  It is in the center of her chest and does not radiate.  She finds that it is sharp in nature.  She is somewhat more short of breath than normal.  She has also been noticing that she is very fatigued and has low energy.  She has not had any recent fevers.  She is having trouble sleeping due to the cough.  She does report a runny nose as well as some facial pain as well.  She thinks that her appetite is low, and her mouth is very dry and she has been having a headache.  She has not been more short of breath with lying flat and has not had any chest pressure or tightness.  Overall, she thinks that she has got an episode of bronchitis and is wondering about further treatment for that.      PAST MEDICAL  HISTORY:   1.  Coronary artery disease with an MI in 2016.   2.  Peripheral arterial disease, status post bilateral AKA.   3.  CVA x3, most recent in 2012.   4.  Heart failure with reduced ejection fraction.   5.  Legally blind.   6.  Seizure disorder.   7.  Sickle trait.   8.  Transgender.   9.  Duodenal ulcer.   10.  GERD.    11.  Chronic pain.   12.  JOSE.   13.  Hypertension.   14.  Hyperlipidemia.   15.  Diabetes.      FAMILY HISTORY:  She has a brother with schizoaffective disorder and depression.  She has a sister with diabetes and depression.  Mother had diabetes and Alzheimer's.  Father had diabetes.      SOCIAL HISTORY:  She has a 75-pack-year smoking history, quit in 2000.  She has a son who lives in [place] and daughter in [place].  The patient herself is currently living in an assisted living facility.  Moved to [place] from [place] to be closer to her children.      REVIEW OF SYSTEMS:  A full 14-point review of systems was done and is negative except as noted above and in the HPI.      PHYSICAL EXAMINATION:   VITAL SIGNS:  Blood pressure 127/83, pulse is 72, respiratory rate is 16, SpO2 is 97% on room air.   GENERAL APPEARANCE:  Appears stated age, no distress.   EYES:  Nasal mucosa is mildly erythematous with some clear drainage.  Otherwise, within normal limits.   MOUTH:  Oral mucosa is dry.  No posterior oropharyngeal erythema or exudate.   NECK:  Supple with no masses.   LYMPHATICS:  No cervical or supraclavicular lymphadenopathy.   RESPIRATORY:  Good air movement bilaterally.  No wheezes, rales or rhonchi.   CARDIOVASCULAR:  Regular rate and rhythm, normal S1, S2, no murmurs, rubs or gallops.  JVP not appreciated with patient sitting upright at 90 degrees.   MUSCULOSKELETAL:  Bilateral AKA noted.      RESULTS:  Chest x-ray from 02/2018 reviewed directly by me.  Formal impression by Dr. Aviles, mild pulmonary vascular engorgement.  Chest x-ray from today is pending.        CBC with platelets  from today pending.  Influenza nasal swab from 2018 was negative.      ASSESSMENT AND PLAN:  The patient is a 68-year-old woman here for ongoing followup of chronic bronchitis with acute exacerbation.     1.  Chronic bronchitis with acute exacerbation.  The patient carries a clinical diagnosis of chronic bronchitis, given a chronic production of thick tenacious sputum.  She has exacerbation of symptoms on top of her baseline with increasing cough, sputum production and fatigue.  She has been using her albuterol plus or minus perceived benefit.  She recently received a course of levofloxacin but unfortunately she had no clear change in her symptoms.  At this time I will treat her for exacerbation of chronic bronchitis with prednisone and azithromycin.  I asked that she go to the lab for a chest x-ray to ensure that she does not have any focal consolidation consistent with pneumonia.  I would also like a CBC with differential, looking for left shift also would be suggestive of acute bacterial infection.  Provided that both of these are negative, would followup on results of prednisone/antibiotics in the next couple of days.     2.  Chronic bronchitis.  A clinical diagnosis of chronic bronchitis, given chronic production of sputum and history of smoking.  Her pulmonary function testing has been within normal limits.  Would continue to use long-acting muscarinic antagonist as a baseline controller medication with albuterol on an as needed basis for shortness of breath.      I will have her come back and see me in approximately 3 months for followup.         POP WOOD MD             D: 2018   T: 2018   MT: LG      Name:     SHARATH MERCEDES   MRN:      [id number]        Account:      [id number]   :      1950           Service Date: 2018      Document: [id number]

## 2018-02-19 NOTE — NURSING NOTE
Chief Complaint   Patient presents with     COPD     Patient is being seen for COPD follow up      Susan Casillas CMA at 10:45 AM on 2/19/2018

## 2018-02-19 NOTE — MR AVS SNAPSHOT
After Visit Summary   2/19/2018    Sonya Foote    MRN: [id number]           Patient Information     Date Of Birth          1950        Visit Information        Provider Department      2/19/2018 11:00 AM Alina Jennings MD [facility]        Today's Diagnoses     Acute bronchitis, unspecified organism    -  1      Care Instructions    It was nice to see you again today.    I'm sorry you have bronchitis- I would like to get a chest xray to make sure you're not brewing a pneumonia, and treat you with prednisone and antibiotics for 5 days.    Hang in there!    Alina Jennings             Follow-ups after your visit        Follow-up notes from your care team     Return in about 3 months (around 5/19/2018).      Your next 10 appointments already scheduled     Feb 19, 2018 11:45 AM CST   Lab with  LAB   [facility] ([facility])    [address] [place] [id number] [phone]            Feb 19, 2018 12:15 PM CST   (Arrive by 12:00 PM)   XR CHEST 2 VIEWS with UCXR1   [facility] Xray ([facility])    [address] [place] [id number] [phone]           Please bring a list of your current medicines to your exam. (Include vitamins, minerals and over-thecounter medicines.) Leave your valuables at home.  Tell your doctor if there is a chance you may be pregnant.  You do not need to do anything special for this exam.            Mar 01, 2018  7:40 AM CST   (Arrive by 7:25 AM)   Post-Op with Keanu Dawson MD   [facility] for Prostate and Urologic Cancers ([facility])    [address] [place] [id number] [phone]            Mar 06, 2018  9:15 AM CST   VISUAL FIELD with [facility] EYE VISUAL FIELD   [facility] ([facility])    [address]wan [address]  Building  [address]  [facility] [address]   [phone]            Mar 06, 2018  9:45 AM CST   RETURN GLAUCOMA with Marely Robin MD   Eye Clinic ([facility])    [address] Building  [address]  [facility] [address]   [phone]            May 11, 2018 10:50 AM CDT   (Arrive by 10:35 AM)   RETURN DIABETES with Michelle Irizarry MD   [facility]ology ([facility])    [address]  3rd [address]  [facility] [address]   [phone]            Jun 04, 2018  9:30 AM CDT   (Arrive by 9:15 AM)   Return Visit with Alina Jennings MD   [facility] ([facility])    [address]  [address]  [facility] [address]   [phone]              Future tests that were ordered for you today     Open Future Orders        Priority Expected Expires Ordered    X-ray Chest 2 vws* Routine 2/19/2018 2/19/2019 2/19/2018    CBC with platelets differential Routine 2/19/2018 3/21/2018 2/19/2018            Who to contact     If you have questions or need follow up information about today's clinic visit or your schedule please contact [facility] directly at [phone].  Normal or non-critical lab and imaging results will be communicated to you by [employer], letter or phone within 4 business days after the clinic has received the results. If you do not hear from us within 7 days, please contact the clinic through [employer] or phone. If you have a critical or abnormal lab result, we will notify you by phone as soon as possible.  Submit refill requests through [employer] or call your pharmacy and they will forward the refill request to us. Please allow 3 business days for your refill to be completed.          Additional Information About Your Visit        [employer] Information     [employer] lets you send messages to your  "doctor, view your test results, renew your prescriptions, schedule appointments and more. To sign up, go to [email or web address] . Click on \"Log in\" on the left side of the screen, which will take you to the Welcome page. Then click on \"Sign up Now\" on the right side of the page.     You will be asked to enter the access code listed below, as well as some personal information. Please follow the directions to create your username and password.     Your access code is: [email or web address]  Expires: 2018  9:57 AM     Your access code will  in 90 days. If you need help or a new code, please call your [facility] clinic or [phone].        Care EveryWhere ID     This is your Care EveryWhere ID. This could be used by other organizations to access your [facility] medical records  [id number]        Your Vitals Were     Pulse Respirations Pulse Oximetry             72 16 97%          Blood Pressure from Last 3 Encounters:   18 127/83   18 108/56   18 121/65    Weight from Last 3 Encounters:   18 55.8 kg (123 lb)   18 55.8 kg (123 lb)   18 55.8 kg (123 lb)                 Today's Medication Changes          These changes are accurate as of 18 11:33 AM.  If you have any questions, ask your nurse or doctor.               Start taking these medicines.        Dose/Directions    azithromycin 250 MG tablet   Commonly known as:  ZITHROMAX   Used for:  Acute bronchitis, unspecified organism   Started by:  Alina Jennings MD        Dose:  250 mg   Take 1 tablet (250 mg) by mouth daily   Quantity:  6 tablet   Refills:  0       predniSONE 20 MG tablet   Commonly known as:  DELTASONE   Used for:  Acute bronchitis, unspecified organism   Started by:  Alina Jennings MD        Dose:  40 mg   Take 2 tablets (40 mg) by mouth daily   Quantity:  10 tablet   Refills:  0         These medicines have changed or have updated prescriptions.        Dose/Directions    " atorvastatin 40 MG tablet   Commonly known as:  LIPITOR   This may have changed:  when to take this   Used for:  Hyperlipidemia LDL goal <100        Dose:  40 mg   Take 1 tablet (40 mg) by mouth daily   Quantity:  90 tablet   Refills:  3       hydrochlorothiazide 12.5 MG capsule   Commonly known as:  MICROZIDE   This may have changed:  additional instructions   Used for:  Essential hypertension with goal blood pressure less than 130/80        Dose:  12.5 mg   Take 1 capsule (12.5 mg) by mouth daily   Quantity:  90 capsule   Refills:  3       Phenytoin Sodium Extended 200 MG Caps   This may have changed:  additional instructions   Used for:  Seizure disorder (H)        Dose:  200 mg   Take 200 mg by mouth 2 times daily   Quantity:  60 capsule   Refills:  0            Where to get your medicines      Some of these will need a paper prescription and others can be bought over the counter.  Ask your nurse if you have questions.     Bring a paper prescription for each of these medications     azithromycin 250 MG tablet    predniSONE 20 MG tablet                Primary Care Provider Office Phone # Fax #    Allan Casey [phone]       [address] [address]        Equal Access to Services     [facility]: Hadii shaheen youo Sojoe, waaxda luqadaha, qaybta kaalmawei encarnacion, tonie marvin . So [facility] [phone].    ATENCIÓN: Si habla español, tiene a briones disposición servicios gratuitos de asistencia lingüística. [facility] [phone].    We comply with applicable federal civil rights laws and [place] laws. We do not discriminate on the basis of race, color, national origin, age, disability, sex, sexual orientation, or gender identity.            Thank you!     Thank you for choosing [facility]  for your care. Our goal is always to provide you with excellent care. Hearing back from our patients is one way we can  continue to improve our services. Please take a few minutes to complete the written survey that you may receive in the mail after your visit with us. Thank you!             Your Updated Medication List - Protect others around you: Learn how to safely use, store and throw away your medicines at www.disposemymeds.org.          This list is accurate as of 2/19/18 11:33 AM.  Always use your most recent med list.                   Brand Name Dispense Instructions for use Diagnosis    ACETAMINOPHEN PO      Take 1,000 mg by mouth every 4 hours as needed for fever Not to exceed 4000 mg/day        ADVAIR DISKUS 250-50 MCG/DOSE diskus inhaler   Generic drug:  fluticasone-salmeterol      Inhale 1 puff into the lungs 2 times daily        albuterol (2.5 MG/3ML) 0.083% neb solution     360 mL    INHALE 1 VIAL VIA NEBULIZER EVERY 6 HOURS AS NEEDED    Chronic obstructive pulmonary disease, unspecified COPD type (H)       aspirin 81 MG EC tablet     90 tablet    Take 1 tablet (81 mg) by mouth daily    Unstable angina (H)       atorvastatin 40 MG tablet    LIPITOR    90 tablet    Take 1 tablet (40 mg) by mouth daily    Hyperlipidemia LDL goal <100       azithromycin 250 MG tablet    ZITHROMAX    6 tablet    Take 1 tablet (250 mg) by mouth daily    Acute bronchitis, unspecified organism       blood glucose monitoring lancets     100 each    Use to test blood sugar 3 times daily or as directed.    Type 2 diabetes mellitus with diabetic peripheral angiopathy without gangrene, without long-term current use of insulin (H)       blood glucose monitoring meter device kit     1 kit    Use to test blood sugars 3 times daily or as directed.    Type 2 diabetes, HbA1C goal < 8% (H)       blood glucose monitoring test strip    ONETOUCH ULTRA    3 Box    Use to test blood sugars 3 times daily or as directed.    Type 2 diabetes mellitus with diabetic peripheral angiopathy without gangrene, without long-term current use of insulin (H)        brimonidine 0.2 % ophthalmic solution    ALPHAGAN     Place 1 drop into the right eye 2 times daily    Primary open angle glaucoma of both eyes, severe stage       calcium citrate-vitamin D 315-250 MG-UNIT Tabs per tablet    CITRACAL    120 tablet    Take 2 tablets by mouth daily    Osteoporosis       cetirizine 10 MG tablet    zyrTEC    90 tablet    Take 1 tablet (10 mg) by mouth daily as needed for allergies    Seasonal allergic rhinitis, unspecified allergic rhinitis trigger       COMPAZINE PO      Take 10 mg by mouth every 8 hours as needed for nausea        CYANOCOBALAMIN PO      Take 2,000 mcg by mouth daily        diclofenac 1 % Gel topical gel    VOLTAREN    100 g    Apply 2 g topically 4 times daily to hands    Primary osteoarthritis of both hands       dorzolamide 2 % ophthalmic solution    TRUSOPT     Place 1 drop into the right eye 2 times daily        EPINEPHrine 0.15 MG/0.3ML injection 2-pack    EPIPEN JR    0.6 mL    Inject 0.3 mLs (0.15 mg) into the muscle as needed for anaphylaxis    Bee sting reaction, undetermined intent, subsequent encounter       escitalopram 10 MG tablet    LEXAPRO     Take 10 mg by mouth daily        ESTRACE VAGINAL 0.1 MG/GM cream   Generic drug:  estradiol     42.5 g    USE DIME SIZE AMOUNT 3X A WEEK AT NIGHT    Atrophic vaginitis       estradiol 1 MG tablet    ESTRACE    90 tablet    Take 1 tablet (1 mg) by mouth daily    Person who has had sex change operation       ferrous sulfate 325 (65 FE) MG tablet    IRON    60 tablet    Take 1 tablet (325 mg) by mouth 2 times daily With meals        fluticasone 50 MCG/ACT spray    FLONASE     Spray 1 spray into both nostrils daily        hydrochlorothiazide 12.5 MG capsule    MICROZIDE    90 capsule    Take 1 capsule (12.5 mg) by mouth daily    Essential hypertension with goal blood pressure less than 130/80       HYDROMORPHONE HCL PO      Take 2 mg by mouth every 3 hours as needed for moderate to severe pain        INCRUSE ELLIPTA  62.5 MCG/INH oral inhaler   Generic drug:  umeclidinium      Inhale 1 puff into the lungs daily        lactulose 10 GM/15ML solution    CHRONULAC     Take 20 g by mouth as needed for constipation        latanoprost 0.005 % ophthalmic solution    XALATAN    2.5 mL    Place 1 drop into both eyes At Bedtime    Primary open angle glaucoma, stage unspecified       levETIRAcetam 500 MG tablet    KEPPRA    180 tablet    Take 1 tablet (500 mg) by mouth 2 times daily    Nausea       levofloxacin 500 MG tablet    LEVAQUIN    7 tablet    Take 1 tablet (500 mg) by mouth daily    Upper respiratory tract infection, unspecified type       lidocaine 5 % Patch    LIDODERM    30 patch    APPLY 1 TO 3 PATCHES TO PAINFUL AREA AT ONCE FOR UP TO 12 HOURS WITHIN A 24 HOUR PERIOD REMOVE AFTER 12 HOURS    Chronic pain syndrome, 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 (H)       lisinopril 10 MG tablet    PRINIVIL/ZESTRIL    90 tablet    Take 1 tablet (10 mg) by mouth daily    Essential hypertension with goal blood pressure less than 130/80       MEDICATION GIVEN BY INTRATHECAL PUMP - INSTRUCTION      continuous 2/8/18: Pump managed by [facility] in [place] [phone].  Conc: Bupivacaine 20 mg/mL and Fentanyl 2000 mcg/mL, morphine 2 mg/mL Continuous:  Fentanyl 796 mcg/day, Bupivacaine 7.96 mg/day, Morphine 0.796 mg/day  Boluses: Up to 7 boluses per 24-hr period of Bupivicaine 0.5994 mg and Fentanyl 59.9 mcg morphine 0.0599 mg. Pump Refill Date 02/28/18        metFORMIN 500 MG 24 hr tablet    GLUCOPHAGE-XR    90 tablet    Take 1 tablet (500 mg) by mouth daily (with dinner)    Type 2 diabetes mellitus with diabetic peripheral angiopathy and gangrene, without long-term current use of insulin (H)       metoprolol succinate 25 MG 24 hr tablet    TOPROL-XL    30 tablet    TAKE 1 TABLET (25 MG) BY MOUTH DAILY    Old myocardial infarction       MULTIVITAMIN PO      Take 1 tablet by mouth daily         nitroGLYcerin 0.4 MG sublingual tablet    NITROSTAT    25 tablet    Place 1 tablet (0.4 mg) under the tongue every 5 minutes as needed for chest pain if you are still having symptoms after 3 doses (15 minutes) call 911.    Chronic systolic congestive heart failure (H), Old myocardial infarction       ondansetron 8 MG ODT tab    ZOFRAN-ODT    30 tablet    Take 1 tablet (8 mg) by mouth every 8 hours as needed for nausea or vomiting    Non-intractable vomiting with nausea, unspecified vomiting type       * order for DME     1 Month    Equipment being ordered: Depends briefs    Incontinence       * order for DME     1 Device    Equipment being ordered: Power Wheelchair    CVA (cerebral infarction), HTN (hypertension)       * order for DME     1 Box    Equipment being ordered: Glucerna daily shakes with each meal    Type 2 diabetes mellitus with other diabetic neurological complication       * order for DME     1 Units    Equipment being ordered: Nebulizer and tubing supplies    Simple chronic bronchitis (H)       * order for DME     1 Device    Equipment being ordered: CPAP supplies mask, hose, filters, cushion fax to [facility] at [phone] Disp: 10 DeviceRefills: prn Class: Local PrintStart: 2/10/2017    Chronic obstructive pulmonary disease, unspecified COPD type (H)       * order for DME     10 Device    Equipment being ordered: CPAP supplies mask, hose, filters, cushion  fax to [facility] at [phone]    COPD (chronic obstructive pulmonary disease) (H)       * order for DME     1 Device    Hospital bed with side rails    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 (H)       * order for DME     1 Device    Full electric hospital bed with half rails  Dx: L37290, I110, J449 Length of need: lifetime    Status post bilateral above knee amputation (H)       * order for DME     1 Device    Wheel Chair Cushion: 18 x 18 inch Roho cushion    Status post bilateral above knee amputation (H)        pantoprazole 40 MG EC tablet    PROTONIX     Take 1 tablet (40 mg) by mouth daily    Gastroesophageal reflux disease without esophagitis       Phenytoin Sodium Extended 200 MG Caps     60 capsule    Take 200 mg by mouth 2 times daily    Seizure disorder (H)       polyethylene glycol Packet    MIRALAX/GLYCOLAX     Take 17 g by mouth daily Dissolved in water or juice        predniSONE 20 MG tablet    DELTASONE    10 tablet    Take 2 tablets (40 mg) by mouth daily    Acute bronchitis, unspecified organism       pregabalin 75 MG capsule    LYRICA    120 capsule    Take 2 capsules (150 mg) by mouth 2 times daily    Pain in both upper extremities       risperiDONE 0.5 MG tablet    risperDAL     Take 0.5 mg by mouth At Bedtime        sucralfate 1 GM tablet    CARAFATE    120 tablet    Take 1 tablet (1 g) by mouth 4 times daily May dissolve in 10 mL water is necessary. (Start upon completion of carafate suspension)    Adjustment disorder with depressed mood       traZODone 50 MG tablet    DESYREL     Take 150 mg by mouth At Bedtime        vitamin D 2000 UNITS tablet     100 tablet    Take 2,000 Units by mouth daily.        zinc 50 MG Tabs      Take 1 tablet by mouth daily        * Notice:  This list has 9 medication(s) that are the same as other medications prescribed for you. Read the directions carefully, and ask your doctor or other care provider to review them with you.

## 2018-02-19 NOTE — PATIENT INSTRUCTIONS
It was nice to see you again today.    I'm sorry you have bronchitis- I would like to get a chest xray to make sure you're not brewing a pneumonia, and treat you with prednisone and antibiotics for 5 days.    Hang in there!    Alina Jennings

## 2018-02-20 ENCOUNTER — PRE VISIT (OUTPATIENT)
Dept: UROLOGY | Facility: CLINIC | Age: 68
End: 2018-02-20

## 2018-02-20 NOTE — PROGRESS NOTES
Service Date: 02/19/2018      CHIEF COMPLAINT:  Acute bronchitis.      HISTORY OF PRESENT ILLNESS:  The patient is a 68-year-old woman known to me from previous visits.  She has several recent hospitalizations with upper GI bleeding.  She has a clinical diagnosis of chronic bronchitis with normal spirometry and chronically taking a long-acting muscarinic antagonist.  She says that she has been noticing symptoms of bronchitis for the last week.  They started in the hospital and got worse after leaving.  She followed up with her primary care provider and was given a prescription for levofloxacin, but she felt like she has had no significant improvement in her breathing.  She says that she has a cough that is productive of yellow and green mucus.  On the first day that she had symptoms, she did cough up a small amount of blood and says that it stopped since then.  She has significant chest pain associated with cough.  It is in the center of her chest and does not radiate.  She finds that it is sharp in nature.  She is somewhat more short of breath than normal.  She has also been noticing that she is very fatigued and has low energy.  She has not had any recent fevers.  She is having trouble sleeping due to the cough.  She does report a runny nose as well as some facial pain as well.  She thinks that her appetite is low, and her mouth is very dry and she has been having a headache.  She has not been more short of breath with lying flat and has not had any chest pressure or tightness.  Overall, she thinks that she has got an episode of bronchitis and is wondering about further treatment for that.      PAST MEDICAL HISTORY:   1.  Coronary artery disease with an MI in 2016.   2.  Peripheral arterial disease, status post bilateral AKA.   3.  CVA x3, most recent in 2012.   4.  Heart failure with reduced ejection fraction.   5.  Legally blind.   6.  Seizure disorder.   7.  Sickle trait.   8.  Transgender.   9.  Duodenal  ulcer.   10.  GERD.    11.  Chronic pain.   12.  JOSE.   13.  Hypertension.   14.  Hyperlipidemia.   15.  Diabetes.      FAMILY HISTORY:  She has a brother with schizoaffective disorder and depression.  She has a sister with diabetes and depression.  Mother had diabetes and Alzheimer's.  Father had diabetes.      SOCIAL HISTORY:  She has a 75-pack-year smoking history, quit in 2000.  She has a son who lives in [place] and daughter in [place].  The patient herself is currently living in an assisted living facility.  Moved to [place] from [place] to be closer to her children.      REVIEW OF SYSTEMS:  A full 14-point review of systems was done and is negative except as noted above and in the HPI.      PHYSICAL EXAMINATION:   VITAL SIGNS:  Blood pressure 127/83, pulse is 72, respiratory rate is 16, SpO2 is 97% on room air.   GENERAL APPEARANCE:  Appears stated age, no distress.   EYES:  Nasal mucosa is mildly erythematous with some clear drainage.  Otherwise, within normal limits.   MOUTH:  Oral mucosa is dry.  No posterior oropharyngeal erythema or exudate.   NECK:  Supple with no masses.   LYMPHATICS:  No cervical or supraclavicular lymphadenopathy.   RESPIRATORY:  Good air movement bilaterally.  No wheezes, rales or rhonchi.   CARDIOVASCULAR:  Regular rate and rhythm, normal S1, S2, no murmurs, rubs or gallops.  JVP not appreciated with patient sitting upright at 90 degrees.   MUSCULOSKELETAL:  Bilateral AKA noted.      RESULTS:  Chest x-ray from 02/2018 reviewed directly by me.  Formal impression by Dr. Aviles, mild pulmonary vascular engorgement.  Chest x-ray from today is pending.        CBC with platelets from today pending.  Influenza nasal swab from 02/09/2018 was negative.      ASSESSMENT AND PLAN:  The patient is a 68-year-old woman here for ongoing followup of chronic bronchitis with acute exacerbation.     1.  Chronic bronchitis with acute exacerbation.  The patient carries a clinical diagnosis of  chronic bronchitis, given a chronic production of thick tenacious sputum.  She has exacerbation of symptoms on top of her baseline with increasing cough, sputum production and fatigue.  She has been using her albuterol plus or minus perceived benefit.  She recently received a course of levofloxacin but unfortunately she had no clear change in her symptoms.  At this time I will treat her for exacerbation of chronic bronchitis with prednisone and azithromycin.  I asked that she go to the lab for a chest x-ray to ensure that she does not have any focal consolidation consistent with pneumonia.  I would also like a CBC with differential, looking for left shift also would be suggestive of acute bacterial infection.  Provided that both of these are negative, would followup on results of prednisone/antibiotics in the next couple of days.     2.  Chronic bronchitis.  A clinical diagnosis of chronic bronchitis, given chronic production of sputum and history of smoking.  Her pulmonary function testing has been within normal limits.  Would continue to use long-acting muscarinic antagonist as a baseline controller medication with albuterol on an as needed basis for shortness of breath.      I will have her come back and see me in approximately 3 months for followup.         POP WOOD MD             D: 2018   T: 2018   MT: HAWK      Name:     SHARATH MERCEDES   MRN:      [id number]        Account:      [id number]   :      1950           Service Date: 2018      Document: [id number]

## 2018-02-20 NOTE — TELEPHONE ENCOUNTER
Post op/SP tube change.  S/P Cystoscopy, Intraoperative Ultrasound, Suprapubic Tube Placement on 1-16-18.  Records available in [facility].

## 2018-02-21 ENCOUNTER — DOCUMENTATION ONLY (OUTPATIENT)
Dept: CARE COORDINATION | Facility: CLINIC | Age: 68
End: 2018-02-21

## 2018-02-21 NOTE — PROGRESS NOTES
[facility] now requests orders and shares plan of care/discharge summaries for some patients through [facility].  Please REPLY TO THIS MESSAGE in order to give authorization for orders when needed.  This is considered a verbal order, you will still receive a faxed copy of orders for signature.  Thank you for your assistance in improving collaboration for our patients.    ORDER    Skilled nursing   [id number], [id number], 3 PRN  OT  [id number]    MD SUMMARY/PLAN OF CARE    /74, P 81, RR 18, O2 sats 93RA, T 98.2F, pain 7/10 chronic in back, BS 89  Sonya Foote is a 67 year old with sexual reassignment surgery MTF with multiple medical problems including DM, neuropathy, chronic low back pain with intrathecal pump, seizure disorder, h/o CVA s/p stent to left carotid, depression/anxiety, schizoaffective disorder, CAD, s/p inferior MI, HTN, PVD s/p bilateral AKA, sickle cell trait, anemia, COPD, asthma, GERD, dysphagia with history of esophageal stricture.   Pt continues to have chronic complications of UTI and successfully had suprapubic catheter placed on 01/16. She also uses macrobid cream prophylactically for infection prevention without success. Pt will need further education on catheter cares and assistance with changes as ordered per MD. Pt remains chairbound d/t bilateral above knee amputations. Pt movement and strength diminished, she continues to work with OT for endurance training and safety with transfer methods aimed at prevention of skin breakdown. Pt skin currently intact, but has history of pressure ulcers on coccyx area. All medications delivered from building staff, blood sugars monitored twice daily with insuling coverage as needed. Blood sugars reported to be well controlled per nursing staff. Pt aware of s/s of hypo/ hyperglycemic events that she should report to staff as needed. She does have a lifeline service for assistance in the building. Pt receiving shower assistance, homemaking  and medication mgmt from building staff. Pt lost partner a couple of months ago and continues to report feelings of depression. She is now working with grief counselor in adddition to weekly [employer] worker visits. Pt states it helps to talk about emotions but it still saddens her deeply from the loss. She moved into a smaller apartment after the loss of her spouse and is now coping with stress of moving in addition to the loss. Pt remains on mechanically soft diet as she continues to report nausea and emesis with eating. Lungs remain clear without concenrs of aspirative complications. Pain remains baseline in back, R shoulder and phantom pain in LEs. Pt does have pain pump which is managed by pain clinic.   Pt is at high risk for hospitalization due to high risk for falls, PU, injuries, UTI, and multiple medical problems. Pt will benefit from skin assessment/edu on risk of skin breakdown secondary to chair bound. Education of risk and prevention of UTI. Pt is aware of her medication, dosage, and frequency but will benefit from med education for fall risk.

## 2018-02-22 NOTE — TELEPHONE ENCOUNTER
This is not a prescription is filled by me and does not have an associated diagnosis therefore I cannot authorize

## 2018-02-22 NOTE — TELEPHONE ENCOUNTER
Zinc 50 mg tab   Last Written Prescription Date:  Reported   Last Office Visit: 2/13/18  Future Office visit:       Routing refill request to provider for review/approval because:  Medication is reported/historical

## 2018-02-26 RX ORDER — GINSENG 100 MG
1 CAPSULE ORAL DAILY
Qty: 90 TABLET | Refills: 3 | Status: CANCELLED | OUTPATIENT
Start: 2018-02-26

## 2018-03-01 ENCOUNTER — CARE COORDINATION (OUTPATIENT)
Dept: GERIATRIC MEDICINE | Facility: CLINIC | Age: 68
End: 2018-03-01

## 2018-03-01 ENCOUNTER — OFFICE VISIT (OUTPATIENT)
Dept: UROLOGY | Facility: CLINIC | Age: 68
End: 2018-03-01
Payer: COMMERCIAL

## 2018-03-01 VITALS
BODY MASS INDEX: 19.26 KG/M2 | WEIGHT: 123 LBS | SYSTOLIC BLOOD PRESSURE: 93 MMHG | HEART RATE: 76 BPM | DIASTOLIC BLOOD PRESSURE: 60 MMHG

## 2018-03-01 DIAGNOSIS — G40.909 SEIZURE DISORDER (H): Primary | ICD-10-CM

## 2018-03-01 DIAGNOSIS — Z87.440 PERSONAL HISTORY OF URINARY TRACT INFECTION: Primary | ICD-10-CM

## 2018-03-01 RX ORDER — CYCLOBENZAPRINE HCL 10 MG
TABLET ORAL
Status: ON HOLD | COMMUNITY
Start: 2017-10-03 | End: 2018-05-02

## 2018-03-01 RX ORDER — ONDANSETRON 4 MG/1
TABLET, ORALLY DISINTEGRATING ORAL
COMMUNITY
Start: 2017-09-06 | End: 2018-03-20

## 2018-03-01 RX ORDER — CIPROFLOXACIN 500 MG/1
TABLET, FILM COATED ORAL
COMMUNITY
Start: 2018-01-11 | End: 2018-03-30

## 2018-03-01 RX ORDER — FLUCONAZOLE 100 MG/1
TABLET ORAL
COMMUNITY
Start: 2018-01-16 | End: 2018-03-30

## 2018-03-01 RX ORDER — DORZOLAMIDE HYDROCHLORIDE AND TIMOLOL MALEATE 20; 5 MG/ML; MG/ML
SOLUTION/ DROPS OPHTHALMIC
COMMUNITY
Start: 2018-01-22 | End: 2018-03-30

## 2018-03-01 RX ORDER — CLOPIDOGREL BISULFATE 75 MG/1
TABLET ORAL
Status: ON HOLD | COMMUNITY
Start: 2017-09-24 | End: 2018-05-02

## 2018-03-01 RX ORDER — TRAZODONE HYDROCHLORIDE 100 MG/1
TABLET ORAL
Status: ON HOLD | COMMUNITY
Start: 2017-06-23 | End: 2018-05-02

## 2018-03-01 RX ORDER — ALBUTEROL SULFATE 90 UG/1
AEROSOL, METERED RESPIRATORY (INHALATION)
COMMUNITY
Start: 2017-10-23 | End: 2018-03-20

## 2018-03-01 RX ORDER — ONDANSETRON 4 MG/1
TABLET, FILM COATED ORAL
COMMUNITY
Start: 2018-01-23 | End: 2018-03-30

## 2018-03-01 ASSESSMENT — PAIN SCALES - GENERAL: PAINLEVEL: EXTREME PAIN (8)

## 2018-03-01 NOTE — LETTER
3/1/2018       RE: Sonya Foote  [address]  [facility] [address]     Dear Colleague,    Thank you for referring your patient, Sonya Foote, to the [facility] UROLOGY AND INST FOR PROSTATE AND UROLOGIC CANCERS at [facility]. Please see a copy of my visit note below.    Assessment and Plan:  Qamar underwent cystourethroscopy and suprapubic cystotomy placement on 1/16/18 for functional urinary incontinence.  She is here for her first SP tube change.      -Follow up in clinic in one month w/ nursing for additional SP tube change  -Follow up with Dr. Oliver on an as-needed basis    Subjective:  Sonya has been experiencing some urethral urinary leakage and notes that the SP tube is slightly tender surrounding insertion, otherwise no blood or clotting in urine.    Objective:  Abdomen: SP tube insertion region is clean, dry and intact.  No induration or erythema surrounding SP tube site.  Urine output clear yellow.    -Scribed by Gerardo Luna, MS4 for Dr. Eunice MD.    Attestation:  This patient was seen and evaluated by me, with a medical student serving as scribe.  I have reviewed the note above and agree.  The physical exam and or any prodecures were performed by me and the pertinant details are outlined below.     Patient is a 68 year old  female   Vitals: Blood pressure 93/60, pulse 76, weight 55.8 kg (123 lb), not currently breastfeeding.  General Appearance Adult: Alert, no acute distress, oriented      Assessment and Plan:  Qamar underwent cystourethroscopy and suprapubic cystotomy placement on 1/16/18 for functional urinary incontinence.  She is here for her first SP tube change.      -Follow up in clinic in one month w/ nursing for additional SP tube change  -Follow up with Dr. Oliver on an as-needed basis     SPT changed without incident placed 16 Fr harrell.    She is doing much better and loves the SPT    Again, thank you for allowing me to participate in the care of  your patient.      Sincerely,    Keanu Dawson MD

## 2018-03-01 NOTE — MR AVS SNAPSHOT
After Visit Summary   3/1/2018    Sonya Foote    MRN: [id number]           Patient Information     Date Of Birth          1950        Visit Information        Provider Department      3/1/2018 7:40 AM Eunice, Keanu Leone MD [facility] Urology and [facility] for Prostate and Urologic Cancers        Today's Diagnoses     Personal history of urinary tract infection    -  1       Follow-ups after your visit        Your next 10 appointments already scheduled     Mar 06, 2018  9:15 AM CST   VISUAL FIELD with [facility] EYE VISUAL FIELD   Eye Clinic ([facility])    [address] [address]   [phone]            Mar 06, 2018  9:45 AM CST   RETURN GLAUCOMA with Marely Robin MD   Eye Clinic ([facility])    [address] [address]   [phone]            May 11, 2018 10:50 AM CDT   (Arrive by 10:35 AM)   RETURN DIABETES with Michelle Irizarry MD   [facility] Endocrinology ([facility])    [address] [place] [id number] [phone]            Jun 04, 2018  9:30 AM CDT   (Arrive by 9:15 AM)   Return Visit with Alina Jennings MD   [facility] ([facility])    [address] [place] [address]  [address] [place] [id number] [phone]              Who to contact     Please call your clinic at [phone] to:    Ask questions about your health    Make or cancel appointments    Discuss your medicines    Learn about your test results    Speak to your doctor            Additional Information About Your Visit        [employer] Information     ACSIAN is an electronic gateway that provides easy, online access to your medical records. With ACSIAN, you can request a clinic appointment, read your test results, renew a prescription or communicate with  your care team.     To sign up for [employer] visit the website at [email or web address]   You will be asked to enter the access code listed below, as well as some personal information. Please follow the directions to create your username and password.     Your access code is: [email or web address]  Expires: 2018  9:57 AM     Your access code will  in 90 days. If you need help or a new code, please contact your [facility] or call [phone] for assistance.        Care EveryWhere ID     This is your Care EveryWhere ID. This could be used by other organizations to access your [place] medical records  [id number]        Your Vitals Were     Pulse BMI (Body Mass Index)                76 19.26 kg/m2           Blood Pressure from Last 3 Encounters:   18 93/60   18 127/83   18 108/56    Weight from Last 3 Encounters:   18 55.8 kg (123 lb)   18 55.8 kg (123 lb)   18 55.8 kg (123 lb)              We Performed the Following     Cath Insertion, Simple (38364)          Today's Medication Changes          These changes are accurate as of 3/1/18  8:22 AM.  If you have any questions, ask your nurse or doctor.               These medicines have changed or have updated prescriptions.        Dose/Directions    atorvastatin 40 MG tablet   Commonly known as:  LIPITOR   This may have changed:  when to take this   Used for:  Hyperlipidemia LDL goal <100        Dose:  40 mg   Take 1 tablet (40 mg) by mouth daily   Quantity:  90 tablet   Refills:  3       hydrochlorothiazide 12.5 MG capsule   Commonly known as:  MICROZIDE   This may have changed:  additional instructions   Used for:  Essential hypertension with goal blood pressure less than 130/80        Dose:  12.5 mg   Take 1 capsule (12.5 mg) by mouth daily   Quantity:  90 capsule   Refills:  3       Phenytoin Sodium Extended 200 MG Caps   This may have changed:  additional instructions   Used for:  Seizure  disorder (H)        Dose:  200 mg   Take 200 mg by mouth 2 times daily   Quantity:  60 capsule   Refills:  0                Primary Care Provider Office Phone # Fax #    Allan Casey [phone] [address] [place] [id number]        Equal Access to Services     ROLANDAIRAJ VELMA : Hadii aad ku hadasho Soomaali, waaxda luqadaha, qaybta kaalmada adeegyada, waxisrael kaseyin alexisn adelilia schmitt lawesrossana ah. So [facility] [phone].    ATENCIÓN: Si habla español, tiene a briones disposición servicios gratuitos de asistencia lingüística. [facility] [phone].    We comply with applicable federal civil rights laws and [place] laws. We do not discriminate on the basis of race, color, national origin, age, disability, sex, sexual orientation, or gender identity.            Thank you!     Thank you for choosing [facility] UROLOGY AND [facility] FOR PROSTATE AND UROLOGIC CANCERS  for your care. Our goal is always to provide you with excellent care. Hearing back from our patients is one way we can continue to improve our services. Please take a few minutes to complete the written survey that you may receive in the mail after your visit with us. Thank you!             Your Updated Medication List - Protect others around you: Learn how to safely use, store and throw away your medicines at www.disposemymeds.org.          This list is accurate as of 3/1/18  8:22 AM.  Always use your most recent med list.                   Brand Name Dispense Instructions for use Diagnosis    ACETAMINOPHEN PO      Take 1,000 mg by mouth every 4 hours as needed for fever Not to exceed 4000 mg/day        acetaminophen-codeine 300-30 MG per tablet    TYLENOL #3          ADVAIR DISKUS 250-50 MCG/DOSE diskus inhaler   Generic drug:  fluticasone-salmeterol      Inhale 1 puff into the lungs 2 times daily        * albuterol (2.5 MG/3ML) 0.083% neb solution     360 mL    INHALE 1 VIAL VIA NEBULIZER EVERY 6 HOURS AS NEEDED    Chronic obstructive  pulmonary disease, unspecified COPD type (H)       * VENTOLIN  (90 BASE) MCG/ACT Inhaler   Generic drug:  albuterol           aspirin 81 MG EC tablet     90 tablet    Take 1 tablet (81 mg) by mouth daily    Unstable angina (H)       atorvastatin 40 MG tablet    LIPITOR    90 tablet    Take 1 tablet (40 mg) by mouth daily    Hyperlipidemia LDL goal <100       azithromycin 250 MG tablet    ZITHROMAX    6 tablet    Take 1 tablet (250 mg) by mouth daily    Acute bronchitis, unspecified organism       blood glucose monitoring lancets     100 each    Use to test blood sugar 3 times daily or as directed.    Type 2 diabetes mellitus with diabetic peripheral angiopathy without gangrene, without long-term current use of insulin (H)       blood glucose monitoring meter device kit     1 kit    Use to test blood sugars 3 times daily or as directed.    Type 2 diabetes, HbA1C goal < 8% (H)       blood glucose monitoring test strip    ONETOUCH ULTRA    3 Box    Use to test blood sugars 3 times daily or as directed.    Type 2 diabetes mellitus with diabetic peripheral angiopathy without gangrene, without long-term current use of insulin (H)       brimonidine 0.2 % ophthalmic solution    ALPHAGAN     Place 1 drop into the right eye 2 times daily    Primary open angle glaucoma of both eyes, severe stage       calcium citrate-vitamin D 315-250 MG-UNIT Tabs per tablet    CITRACAL    120 tablet    Take 2 tablets by mouth daily    Osteoporosis       cetirizine 10 MG tablet    zyrTEC    90 tablet    Take 1 tablet (10 mg) by mouth daily as needed for allergies    Seasonal allergic rhinitis, unspecified allergic rhinitis trigger       ciprofloxacin 500 MG tablet    CIPRO          clopidogrel 75 MG tablet    PLAVIX          COMPAZINE PO      Take 10 mg by mouth every 8 hours as needed for nausea        CYANOCOBALAMIN PO      Take 2,000 mcg by mouth daily        cyclobenzaprine 10 MG tablet    FLEXERIL          diclofenac 1 % Gel  topical gel    VOLTAREN    100 g    Apply 2 g topically 4 times daily to hands    Primary osteoarthritis of both hands       dorzolamide 2 % ophthalmic solution    TRUSOPT     Place 1 drop into the right eye 2 times daily        dorzolamide-timolol 2-0.5 % ophthalmic solution    COSOPT          EPINEPHrine 0.15 MG/0.3ML injection 2-pack    EPIPEN JR    0.6 mL    Inject 0.3 mLs (0.15 mg) into the muscle as needed for anaphylaxis    Bee sting reaction, undetermined intent, subsequent encounter       escitalopram 10 MG tablet    LEXAPRO     Take 10 mg by mouth daily        ESTRACE VAGINAL 0.1 MG/GM cream   Generic drug:  estradiol     42.5 g    USE DIME SIZE AMOUNT 3X A WEEK AT NIGHT    Atrophic vaginitis       estradiol 1 MG tablet    ESTRACE    90 tablet    Take 1 tablet (1 mg) by mouth daily    Person who has had sex change operation       ferrous sulfate 325 (65 FE) MG tablet    IRON    60 tablet    Take 1 tablet (325 mg) by mouth 2 times daily With meals        fluconazole 100 MG tablet    DIFLUCAN          fluticasone 50 MCG/ACT spray    FLONASE     Spray 1 spray into both nostrils daily        hydrochlorothiazide 12.5 MG capsule    MICROZIDE    90 capsule    Take 1 capsule (12.5 mg) by mouth daily    Essential hypertension with goal blood pressure less than 130/80       HYDROMORPHONE HCL PO      Take 2 mg by mouth every 3 hours as needed for moderate to severe pain        INCRUSE ELLIPTA 62.5 MCG/INH oral inhaler   Generic drug:  umeclidinium      Inhale 1 puff into the lungs daily        lactulose 10 GM/15ML solution    CHRONULAC     Take 20 g by mouth as needed for constipation        latanoprost 0.005 % ophthalmic solution    XALATAN    2.5 mL    Place 1 drop into both eyes At Bedtime    Primary open angle glaucoma, stage unspecified       levETIRAcetam 500 MG tablet    KEPPRA    180 tablet    Take 1 tablet (500 mg) by mouth 2 times daily    Nausea       levofloxacin 500 MG tablet    LEVAQUIN    7 tablet     Take 1 tablet (500 mg) by mouth daily    Upper respiratory tract infection, unspecified type       lidocaine 5 % Patch    LIDODERM    30 patch    APPLY 1 TO 3 PATCHES TO PAINFUL AREA AT ONCE FOR UP TO 12 HOURS WITHIN A 24 HOUR PERIOD REMOVE AFTER 12 HOURS    Chronic pain syndrome, 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 (H)       lisinopril 10 MG tablet    PRINIVIL/ZESTRIL    90 tablet    Take 1 tablet (10 mg) by mouth daily    Essential hypertension with goal blood pressure less than 130/80       MEDICATION GIVEN BY INTRATHECAL PUMP - INSTRUCTION      continuous 2/8/18: Pump managed by [facility] in [place] [phone].  Conc: Bupivacaine 20 mg/mL and Fentanyl 2000 mcg/mL, morphine 2 mg/mL Continuous:  Fentanyl 796 mcg/day, Bupivacaine 7.96 mg/day, Morphine 0.796 mg/day  Boluses: Up to 7 boluses per 24-hr period of Bupivicaine 0.5994 mg and Fentanyl 59.9 mcg morphine 0.0599 mg. Pump Refill Date 02/28/18        metFORMIN 500 MG 24 hr tablet    GLUCOPHAGE-XR    90 tablet    Take 1 tablet (500 mg) by mouth daily (with dinner)    Type 2 diabetes mellitus with diabetic peripheral angiopathy and gangrene, without long-term current use of insulin (H)       metoprolol succinate 25 MG 24 hr tablet    TOPROL-XL    30 tablet    TAKE 1 TABLET (25 MG) BY MOUTH DAILY    Old myocardial infarction       MULTIVITAMIN PO      Take 1 tablet by mouth daily        nitroGLYcerin 0.4 MG sublingual tablet    NITROSTAT    25 tablet    Place 1 tablet (0.4 mg) under the tongue every 5 minutes as needed for chest pain if you are still having symptoms after 3 doses (15 minutes) call 911.    Chronic systolic congestive heart failure (H), Old myocardial infarction       omeprazole 20 MG CR capsule    priLOSEC          ondansetron 4 MG tablet    ZOFRAN          * ondansetron 4 MG ODT tab    ZOFRAN-ODT          * ondansetron 8 MG ODT tab    ZOFRAN-ODT    30 tablet    Take 1 tablet (8 mg) by mouth  every 8 hours as needed for nausea or vomiting    Non-intractable vomiting with nausea, unspecified vomiting type       * order for DME     1 Month    Equipment being ordered: Depends briefs    Incontinence       * order for DME     1 Device    Equipment being ordered: Power Wheelchair    CVA (cerebral infarction), HTN (hypertension)       * order for DME     1 Box    Equipment being ordered: Glucerna daily shakes with each meal    Type 2 diabetes mellitus with other diabetic neurological complication       * order for DME     1 Units    Equipment being ordered: Nebulizer and tubing supplies    Simple chronic bronchitis (H)       * order for DME     1 Device    Equipment being ordered: CPAP supplies mask, hose, filters, cushion fax to [facility] at [phone] Disp: 10 DeviceRefills: prn Class: Local PrintStart: 2/10/2017    Chronic obstructive pulmonary disease, unspecified COPD type (H)       * order for DME     10 Device    Equipment being ordered: CPAP supplies mask, hose, filters, cushion  fax to [facility] at [phone]    COPD (chronic obstructive pulmonary disease) (H)       * order for DME     1 Device    Hospital bed with side rails    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 (H)       * order for DME     1 Device    Full electric hospital bed with half rails  Dx: Y98281, I110, J449 Length of need: lifetime    Status post bilateral above knee amputation (H)       * order for DME     1 Device    Wheel Chair Cushion: 18 x 18 inch Roho cushion    Status post bilateral above knee amputation (H)       pantoprazole 40 MG EC tablet    PROTONIX     Take 1 tablet (40 mg) by mouth daily    Gastroesophageal reflux disease without esophagitis       Phenytoin Sodium Extended 200 MG Caps     60 capsule    Take 200 mg by mouth 2 times daily    Seizure disorder (H)       polyethylene glycol Packet    MIRALAX/GLYCOLAX     Take 17 g by mouth daily Dissolved in water or juice        predniSONE 20  MG tablet    DELTASONE    10 tablet    Take 2 tablets (40 mg) by mouth daily    Acute bronchitis, unspecified organism       pregabalin 75 MG capsule    LYRICA    120 capsule    Take 2 capsules (150 mg) by mouth 2 times daily    Pain in both upper extremities       risperiDONE 0.5 MG tablet    risperDAL     Take 0.5 mg by mouth At Bedtime        sucralfate 1 GM tablet    CARAFATE    120 tablet    Take 1 tablet (1 g) by mouth 4 times daily May dissolve in 10 mL water is necessary. (Start upon completion of carafate suspension)    Adjustment disorder with depressed mood       * traZODone 100 MG tablet    DESYREL          * traZODone 50 MG tablet    DESYREL     Take 150 mg by mouth At Bedtime        vitamin D 2000 UNITS tablet     100 tablet    Take 2,000 Units by mouth daily.        zinc 50 MG Tabs      Take 1 tablet by mouth daily        * Notice:  This list has 15 medication(s) that are the same as other medications prescribed for you. Read the directions carefully, and ask your doctor or other care provider to review them with you.

## 2018-03-01 NOTE — NURSING NOTE
Chief Complaint   Patient presents with     Surgical Followup     Post op/SP tube change.  S/P Cystoscopy, Intraoperative Ultrasound, Suprapubic Tube Placement on 1-16-18.      Merry Gomez

## 2018-03-01 NOTE — PROGRESS NOTES
3/1/18: CM received call from member that she received 2 bills - she is faxing to CM.     CM received bills  [employer] bill for 1/12/18 for wipes for $21.40.   Spoke with Karime in billing - she states this is for EW obligation.  She will put note on account that CM is f/u with member and AL re: EW obligation.   She also advised to complete the Designated Provider Form.   Cm placed call to [facility] - spoke with Val - he states that [facility] [place] cannot utilize the form however there can be an exception - he will send out form to member and to complete.   CM placed call to member and relayed info. She believes the full $95 EW obligation was taken out but she is unsure.   CM left vm for Crystal in billing to confirm re: EW obligation for Abdi.     CM also placed call to [facility] re: bill - left voice message.     Jamie Sharma RN, N  [facility]

## 2018-03-01 NOTE — NURSING NOTE
Catheter insertion documentation on 3/1/2018:    Sonya Foote presents to the clinic for catheter insertion.  Reason for insertion: replacement  Order has been verified. YES  Catheter successfully inserted into the suprapubic meatus in the usual sterile fashion without immediate complication.  Type of catheter placed: 16 Nigerien straight catheter  Urine is yellow in color.  30 cc's of urine output returned.  Balloon was filled with 10cc's of normal saline.  Securement device placed for the catheter.  The patient tolerated the procedure and was instructed to return or call for pain, fever, leakage or decreased urine flow.    One Cipro 500 mg tablet given PO prior to catheter change.    AUBREY Fernandez

## 2018-03-01 NOTE — PROGRESS NOTES
Assessment and Plan:  Qamar underwent cystourethroscopy and suprapubic cystotomy placement on 1/16/18 for functional urinary incontinence.  She is here for her first SP tube change.      -Follow up in clinic in one month w/ nursing for additional SP tube change  -Follow up with Dr. Oliver on an as-needed basis    Subjective:  Sonya has been experiencing some urethral urinary leakage and notes that the SP tube is slightly tender surrounding insertion, otherwise no blood or clotting in urine.    Objective:  Abdomen: SP tube insertion region is clean, dry and intact.  No induration or erythema surrounding SP tube site.  Urine output clear yellow.    -Scribed by Gerardo Luna, MS4 for Dr. Eunice MD.    Attestation:  This patient was seen and evaluated by me, with a medical student serving as scribe.  I have reviewed the note above and agree.  The physical exam and or any prodecures were performed by me and the pertinant details are outlined below.     Patient is a 68 year old  female   Vitals: Blood pressure 93/60, pulse 76, weight 55.8 kg (123 lb), not currently breastfeeding.  General Appearance Adult: Alert, no acute distress, oriented      Assessment and Plan:  Qamar underwent cystourethroscopy and suprapubic cystotomy placement on 1/16/18 for functional urinary incontinence.  She is here for her first SP tube change.      -Follow up in clinic in one month w/ nursing for additional SP tube change  -Follow up with Dr. Oliver on an as-needed basis     SPT changed without incident placed 16 Fr harrell.    She is doing much better and loves the SPT      Keanu Dawson MD  Department of Urology  [facility]

## 2018-03-06 ENCOUNTER — TELEPHONE (OUTPATIENT)
Dept: INTERNAL MEDICINE | Facility: CLINIC | Age: 68
End: 2018-03-06

## 2018-03-06 DIAGNOSIS — Z89.511 STATUS POST BELOW KNEE AMPUTATION OF RIGHT LOWER EXTREMITY (H): ICD-10-CM

## 2018-03-06 DIAGNOSIS — G89.4 CHRONIC PAIN SYNDROME: ICD-10-CM

## 2018-03-06 RX ORDER — LIDOCAINE 50 MG/G
PATCH TOPICAL
Qty: 30 PATCH | Refills: 5 | Status: CANCELLED | OUTPATIENT
Start: 2018-03-06

## 2018-03-06 NOTE — TELEPHONE ENCOUNTER
PA required on: LIDOCAINE 5% PATCH  Patient Insurance: MEDICA DUAL  Insurance BIN: [id number]     Insurance PCN: [id number]  Insurance ID: [id number]  Insurance phone number: [phone]   Pharmacy NPI: [id number]    Please let us know if it's approved or denied. Thank You!    George Shepherd   [facility]  [facility]

## 2018-03-06 NOTE — TELEPHONE ENCOUNTER
Will need to route for PA and forward as needed as I am unclear as to her current protocol and not available

## 2018-03-07 DIAGNOSIS — K21.9 GASTROESOPHAGEAL REFLUX DISEASE WITHOUT ESOPHAGITIS: ICD-10-CM

## 2018-03-07 RX ORDER — PANTOPRAZOLE SODIUM 40 MG/1
40 TABLET, DELAYED RELEASE ORAL DAILY
Qty: 30 TABLET | Refills: 5 | Status: SHIPPED | OUTPATIENT
Start: 2018-03-07 | End: 2019-06-05

## 2018-03-07 NOTE — TELEPHONE ENCOUNTER
pantoprazole (PROTONIX) 40 MG EC tablet      Last Written Prescription Date:  0  Last Fill Quantity: 0,   # refills: 0  Last Office Visit: 2/13/2018  Future Office visit:       Routing refill request to provider for review/approval because:  Medication is reported/historical

## 2018-03-08 NOTE — PROGRESS NOTES
"3/7/18: CM received call back from Jovana at [facility] re: EW obligation from Jan.   Jovana states that only $59 for EW obligation was taken out for 1/2018.  She reviewed bill and confirmed.   CM placed call to member and relayed this to her.  Also, explained to member to verify on her Jan bill.      She will pay [facility] the $21.40 from Jan.  She states that she does not have the money at this time but will pay next month.  CM will f/u with [facility] and let them know.  She would like wipes on hold for now.    Member states her power chair had not been working - they \"fixed\" it so that it temporarily works while waiting for parts.      Jamie Sharma RN, N  [facility]      "

## 2018-03-12 NOTE — TELEPHONE ENCOUNTER
Central Prior Authorization Team   Phone: [phone]    PA Initiation    Medication: LIDOCAINE 5% PATCH  Insurance Company: Billie Schmitt - Phone [phone] Fax [phone]  Pharmacy Filling the Rx: [facility] [place], [place] - [address] [place] [address]  Filling Pharmacy Phone: [phone]  Filling Pharmacy Fax:    Start Date: 3/12/2018    Manually faxed request to insurance

## 2018-03-12 NOTE — TELEPHONE ENCOUNTER
Prior Authorization Approval    Authorization Effective Date: 1/1/2018  Authorization Expiration Date: 3/12/2019  Medication: LIDOCAINE 5% PATCH  Approved Dose/Quantity:    Reference #:     Insurance Company: Billie Hollingsworthjason - Phone [phone] Fax [phone]  Expected CoPay: 0.00     CoPay Card Available:      Foundation Assistance Needed:    Which Pharmacy is filling the prescription (Not needed for infusion/clinic administered): [facility] [place], [place] - [address] [place] [address]  Pharmacy Notified: Yes  Patient Notified: Yes

## 2018-03-14 ENCOUNTER — CARE COORDINATION (OUTPATIENT)
Dept: GERIATRIC MEDICINE | Facility: CLINIC | Age: 68
End: 2018-03-14

## 2018-03-14 DIAGNOSIS — Z76.89 HEALTH CARE HOME: Chronic | ICD-10-CM

## 2018-03-14 DIAGNOSIS — J20.9 ACUTE BRONCHITIS: Primary | ICD-10-CM

## 2018-03-14 NOTE — PROGRESS NOTES
3/14/18: CM has left voice message for [facility] in billing.  Relayed to member - she has appt there and will bring bill with her at that time to discuss.     Jamie Sharma RN, N  [facility]

## 2018-03-14 NOTE — PROGRESS NOTES
CM received call from member - the talking wrist watch she had stopped working - she went to battery store and had them insert new batteries however still does not work.   CM placed call to [facility] - spoke with Sonali - she states that member received in 9/2016 - no warranty.  She will reach out to  and see if can purchase this same watch - member prefers metal band this time.  Sonali will send CM quote.    Jamie Sharma RN, PHN  [facility]

## 2018-03-15 ENCOUNTER — CARE COORDINATION (OUTPATIENT)
Dept: GERIATRIC MEDICINE | Facility: CLINIC | Age: 68
End: 2018-03-15

## 2018-03-15 DIAGNOSIS — Z76.89 HEALTH CARE HOME: Chronic | ICD-10-CM

## 2018-03-15 NOTE — PROGRESS NOTES
CM received email from ULISES Shine with [facility] stating that today is last visit with member.  Member is requesting knit stump covers for bilateral LE.  She states they often get cold and has nothing to cover them.   ELVIRA sent request to Sonali at [facility] for quote.     Jamie Sharma RN, N  [employer] [employer]

## 2018-03-15 NOTE — PROGRESS NOTES
Received quote from Sonali @ [facility] for talking watch.  CPS and POC updated.  Sent to CMS for auth.     Jamie Sharma RN, PHN  [facility]

## 2018-03-19 ENCOUNTER — DOCUMENTATION ONLY (OUTPATIENT)
Dept: CARE COORDINATION | Facility: CLINIC | Age: 68
End: 2018-03-19

## 2018-03-19 ENCOUNTER — RADIANT APPOINTMENT (OUTPATIENT)
Dept: MAMMOGRAPHY | Facility: CLINIC | Age: 68
End: 2018-03-19
Payer: COMMERCIAL

## 2018-03-19 DIAGNOSIS — Z12.31 VISIT FOR SCREENING MAMMOGRAM: ICD-10-CM

## 2018-03-19 NOTE — PROGRESS NOTES
[facility] now requests orders and shares plan of care/discharge summaries for some patients through [employer]  Please REPLY TO THIS MESSAGE in order to give authorization for orders when needed.  This is considered a verbal order, you will still receive a faxed copy of orders for signature.  Thank you for your assistance in improving collaboration for our patients.    ORDER    ST to eval for memory concerns.    Order re written w/ extended end date of 4/4/18 due to Pt scheduling preference.    Johnna Awan MA, CCC-SLP  Speech-Language Pathologist  [facility]  [email or web address]  [phone]

## 2018-03-20 ENCOUNTER — OFFICE VISIT (OUTPATIENT)
Dept: INTERNAL MEDICINE | Facility: CLINIC | Age: 68
End: 2018-03-20
Payer: COMMERCIAL

## 2018-03-20 VITALS
WEIGHT: 123 LBS | DIASTOLIC BLOOD PRESSURE: 68 MMHG | SYSTOLIC BLOOD PRESSURE: 112 MMHG | HEART RATE: 73 BPM | TEMPERATURE: 98.5 F | OXYGEN SATURATION: 95 % | BODY MASS INDEX: 19.26 KG/M2 | RESPIRATION RATE: 15 BRPM

## 2018-03-20 DIAGNOSIS — G40.909 SEIZURE DISORDER (H): ICD-10-CM

## 2018-03-20 DIAGNOSIS — J44.9 CHRONIC OBSTRUCTIVE PULMONARY DISEASE, UNSPECIFIED COPD TYPE (H): Chronic | ICD-10-CM

## 2018-03-20 DIAGNOSIS — G89.4 CHRONIC PAIN SYNDROME: ICD-10-CM

## 2018-03-20 DIAGNOSIS — G43.809 OTHER MIGRAINE WITHOUT STATUS MIGRAINOSUS, NOT INTRACTABLE: Primary | ICD-10-CM

## 2018-03-20 LAB — PHENYTOIN SERPL-MCNC: 17.9 MG/L (ref 10–20)

## 2018-03-20 PROCEDURE — 80185 ASSAY OF PHENYTOIN TOTAL: CPT | Performed by: INTERNAL MEDICINE

## 2018-03-20 PROCEDURE — 99214 OFFICE O/P EST MOD 30 MIN: CPT | Performed by: INTERNAL MEDICINE

## 2018-03-20 PROCEDURE — 36415 COLL VENOUS BLD VENIPUNCTURE: CPT | Performed by: INTERNAL MEDICINE

## 2018-03-20 RX ORDER — ALBUTEROL SULFATE 90 UG/1
2 AEROSOL, METERED RESPIRATORY (INHALATION) EVERY 6 HOURS PRN
Qty: 3 INHALER | Refills: 5 | Status: SHIPPED | OUTPATIENT
Start: 2018-03-20 | End: 2019-06-05

## 2018-03-20 RX ORDER — ALBUTEROL SULFATE 90 UG/1
AEROSOL, METERED RESPIRATORY (INHALATION)
Status: CANCELLED | OUTPATIENT
Start: 2018-03-20

## 2018-03-20 RX ORDER — ONDANSETRON 8 MG/1
8 TABLET, ORALLY DISINTEGRATING ORAL EVERY 8 HOURS PRN
Qty: 30 TABLET | Refills: 5 | Status: SHIPPED | OUTPATIENT
Start: 2018-03-20 | End: 2019-06-19

## 2018-03-20 ASSESSMENT — PAIN SCALES - GENERAL: PAINLEVEL: EXTREME PAIN (8)

## 2018-03-20 NOTE — PROGRESS NOTES
Received quote from Sonali @ [facility] for limb/stump knit covers.  EW item.   CPS and POC updated.  Sent to CMS for auth/update.     Jamie Sharma RN, N  [employer] [employer]

## 2018-03-20 NOTE — MR AVS SNAPSHOT
After Visit Summary   3/20/2018    Sonya Foote    MRN: [id number]           Patient Information     Date Of Birth          1950        Visit Information        Provider Department      3/20/2018 11:20 AM Allan Casey MD [facility]        Today's Diagnoses     Other migraine without status migrainosus, not intractable    -  1    Chronic obstructive pulmonary disease, unspecified COPD type (H)        Seizure disorder (H)        Chronic pain syndrome           Follow-ups after your visit        Follow-up notes from your care team     Return if symptoms worsen or fail to improve.      Your next 10 appointments already scheduled     Mar 20, 2018 11:20 AM CDT   SHORT with Allan Casey MD   [facility]    [address] [id number] [phone]            Mar 29, 2018  7:45 AM CDT   RETURN GLAUCOMA with Marely Robin MD   [facility] ([facility])    [address]  [address]  [facility] [address]   [phone]            May 04, 2018 10:20 AM CDT   (Arrive by 10:05 AM)   RETURN DIABETES with Michelle Irizarry MD   [facility] ([facility])    [address] [place] [id number] [phone]            Jun 04, 2018  9:30 AM CDT   (Arrive by 9:15 AM)   Return Visit with Alina Jennings MD   [facility] ([facility])    [address] [place] [address]  [address] [place] [id number] [phone]              Who to contact     If you have questions or need follow up information about today's clinic visit or your schedule please contact [facility] directly at [phone].  Normal or non-critical lab and imaging results will be communicated to you by MyChart, letter or phone within 4  "business days after the clinic has received the results. If you do not hear from us within 7 days, please contact the clinic through [employer] or phone. If you have a critical or abnormal lab result, we will notify you by phone as soon as possible.  Submit refill requests through [employer] or call your pharmacy and they will forward the refill request to us. Please allow 3 business days for your refill to be completed.          Additional Information About Your Visit        WorkMeIn Information     [employer] lets you send messages to your doctor, view your test results, renew your prescriptions, schedule appointments and more. To sign up, go to [email or web address] . Click on \"Log in\" on the left side of the screen, which will take you to the Welcome page. Then click on \"Sign up Now\" on the right side of the page.     You will be asked to enter the access code listed below, as well as some personal information. Please follow the directions to create your username and password.     Your access code is: [email or web address]  Expires: 2018 10:57 AM     Your access code will  in 90 days. If you need help or a new code, please call your [place] clinic or [phone].        Care EveryWhere ID     This is your Care EveryWhere ID. This could be used by other organizations to access your [place] medical records  [id number]        Your Vitals Were     Pulse Temperature Respirations Pulse Oximetry BMI (Body Mass Index)       73 98.5  F (36.9  C) (Oral) 15 95% 19.26 kg/m2        Blood Pressure from Last 3 Encounters:   18 112/68   18 93/60   18 127/83    Weight from Last 3 Encounters:   18 123 lb (55.8 kg)   18 123 lb (55.8 kg)   18 123 lb (55.8 kg)              Today, you had the following     No orders found for display         Today's Medication Changes          These changes are accurate as of 3/20/18 10:39 AM.  If you have any questions, ask your nurse or doctor.               These " medicines have changed or have updated prescriptions.        Dose/Directions    * albuterol (2.5 MG/3ML) 0.083% neb solution   This may have changed:  Another medication with the same name was changed. Make sure you understand how and when to take each.   Used for:  Chronic obstructive pulmonary disease, unspecified COPD type (H)   Changed by:  Allan Casey MD        INHALE 1 VIAL VIA NEBULIZER EVERY 6 HOURS AS NEEDED   Quantity:  360 mL   Refills:  11       * VENTOLIN  (90 BASE) MCG/ACT Inhaler   This may have changed:    - how much to take  - how to take this  - when to take this  - reasons to take this   Used for:  Chronic obstructive pulmonary disease, unspecified COPD type (H)   Generic drug:  albuterol   Changed by:  Allan Casey MD        Dose:  2 puff   Inhale 2 puffs into the lungs every 6 hours as needed for shortness of breath / dyspnea or wheezing   Quantity:  3 Inhaler   Refills:  5       atorvastatin 40 MG tablet   Commonly known as:  LIPITOR   This may have changed:  when to take this   Used for:  Hyperlipidemia LDL goal <100        Dose:  40 mg   Take 1 tablet (40 mg) by mouth daily   Quantity:  90 tablet   Refills:  3       hydrochlorothiazide 12.5 MG capsule   Commonly known as:  MICROZIDE   This may have changed:  additional instructions   Used for:  Essential hypertension with goal blood pressure less than 130/80        Dose:  12.5 mg   Take 1 capsule (12.5 mg) by mouth daily   Quantity:  90 capsule   Refills:  3       ondansetron 8 MG ODT tab   Commonly known as:  ZOFRAN-ODT   This may have changed:  reasons to take this   Used for:  Other migraine without status migrainosus, not intractable   Changed by:  Allan Casey MD        Dose:  8 mg   Take 1 tablet (8 mg) by mouth every 8 hours as needed   Quantity:  30 tablet   Refills:  5       Phenytoin Sodium Extended 200 MG Caps   This may have changed:  additional instructions   Used for:  Seizure disorder (H)        Dose:  200  mg   Take 200 mg by mouth 2 times daily   Quantity:  60 capsule   Refills:  0       * Notice:  This list has 2 medication(s) that are the same as other medications prescribed for you. Read the directions carefully, and ask your doctor or other care provider to review them with you.         Where to get your medicines      These medications were sent to [facility] [place], [place] - [address]  [address] [address]     Phone:  [phone]     VENTOLIN  (90 BASE) MCG/ACT Inhaler         These medications were sent to [facility], [place] - [address]  [address], [facility] [address]     Phone:  [phone]     ondansetron 8 MG ODT tab                Primary Care Provider Office Phone # Fax #    Allan Casey [phone]       [address] [place] [phone]        Equal Access to Services     KARI CARREON AH: Hadii aad ku hadasho Soomaali, waaxda luqadaha, qaybta kaalmada adeegyada, waxay idiin hayaan irvineg fayearalizandro marvin . So [facility] [phone].    ATENCIÓN: Si habla español, tiene a briones disposición servicios gratuitos de asistencia lingüística. [facility] [phone].    We comply with applicable federal civil rights laws and [place] laws. We do not discriminate on the basis of race, color, national origin, age, disability, sex, sexual orientation, or gender identity.            Thank you!     Thank you for choosing [facility]  for your care. Our goal is always to provide you with excellent care. Hearing back from our patients is one way we can continue to improve our services. Please take a few minutes to complete the written survey that you may receive in the mail after your visit with us. Thank you!             Your Updated Medication List - Protect others around you: Learn how to safely use, store and throw away your medicines at  www.disposemymeds.org.          This list is accurate as of 3/20/18 10:39 AM.  Always use your most recent med list.                   Brand Name Dispense Instructions for use Diagnosis    ACETAMINOPHEN PO      Take 1,000 mg by mouth every 4 hours as needed for fever Not to exceed 4000 mg/day        acetaminophen-codeine 300-30 MG per tablet    TYLENOL #3          ADVAIR DISKUS 250-50 MCG/DOSE diskus inhaler   Generic drug:  fluticasone-salmeterol     60 Inhaler    Inhale 1 puff into the lungs 2 times daily    Acute bronchitis       * albuterol (2.5 MG/3ML) 0.083% neb solution     360 mL    INHALE 1 VIAL VIA NEBULIZER EVERY 6 HOURS AS NEEDED    Chronic obstructive pulmonary disease, unspecified COPD type (H)       * VENTOLIN  (90 BASE) MCG/ACT Inhaler   Generic drug:  albuterol     3 Inhaler    Inhale 2 puffs into the lungs every 6 hours as needed for shortness of breath / dyspnea or wheezing    Chronic obstructive pulmonary disease, unspecified COPD type (H)       aspirin 81 MG EC tablet     90 tablet    Take 1 tablet (81 mg) by mouth daily    Unstable angina (H)       atorvastatin 40 MG tablet    LIPITOR    90 tablet    Take 1 tablet (40 mg) by mouth daily    Hyperlipidemia LDL goal <100       blood glucose monitoring lancets     100 each    Use to test blood sugar 3 times daily or as directed.    Type 2 diabetes mellitus with diabetic peripheral angiopathy without gangrene, without long-term current use of insulin (H)       blood glucose monitoring meter device kit     1 kit    Use to test blood sugars 3 times daily or as directed.    Type 2 diabetes, HbA1C goal < 8% (H)       blood glucose monitoring test strip    ONETOUCH ULTRA    3 Box    Use to test blood sugars 3 times daily or as directed.    Type 2 diabetes mellitus with diabetic peripheral angiopathy without gangrene, without long-term current use of insulin (H)       brimonidine 0.2 % ophthalmic solution    ALPHAGAN     Place 1 drop into the right  eye 2 times daily    Primary open angle glaucoma of both eyes, severe stage       calcium citrate-vitamin D 315-250 MG-UNIT Tabs per tablet    CITRACAL    120 tablet    Take 2 tablets by mouth daily    Osteoporosis       cetirizine 10 MG tablet    zyrTEC    90 tablet    Take 1 tablet (10 mg) by mouth daily as needed for allergies    Seasonal allergic rhinitis, unspecified allergic rhinitis trigger       ciprofloxacin 500 MG tablet    CIPRO          clopidogrel 75 MG tablet    PLAVIX          COMPAZINE PO      Take 10 mg by mouth every 8 hours as needed for nausea        CYANOCOBALAMIN PO      Take 2,000 mcg by mouth daily        cyclobenzaprine 10 MG tablet    FLEXERIL          diclofenac 1 % Gel topical gel    VOLTAREN    100 g    Apply 2 g topically 4 times daily to hands    Primary osteoarthritis of both hands       dorzolamide 2 % ophthalmic solution    TRUSOPT     Place 1 drop into the right eye 2 times daily        dorzolamide-timolol 2-0.5 % ophthalmic solution    COSOPT          EPINEPHrine 0.15 MG/0.3ML injection 2-pack    EPIPEN JR    0.6 mL    Inject 0.3 mLs (0.15 mg) into the muscle as needed for anaphylaxis    Bee sting reaction, undetermined intent, subsequent encounter       escitalopram 10 MG tablet    LEXAPRO     Take 10 mg by mouth daily        ESTRACE VAGINAL 0.1 MG/GM cream   Generic drug:  estradiol     42.5 g    USE DIME SIZE AMOUNT 3X A WEEK AT NIGHT    Atrophic vaginitis       estradiol 1 MG tablet    ESTRACE    90 tablet    Take 1 tablet (1 mg) by mouth daily    Person who has had sex change operation       ferrous sulfate 325 (65 FE) MG tablet    IRON    60 tablet    Take 1 tablet (325 mg) by mouth 2 times daily With meals        fluconazole 100 MG tablet    DIFLUCAN          fluticasone 50 MCG/ACT spray    FLONASE     Spray 1 spray into both nostrils daily        hydrochlorothiazide 12.5 MG capsule    MICROZIDE    90 capsule    Take 1 capsule (12.5 mg) by mouth daily    Essential  hypertension with goal blood pressure less than 130/80       HYDROMORPHONE HCL PO      Take 2 mg by mouth every 3 hours as needed for moderate to severe pain        INCRUSE ELLIPTA 62.5 MCG/INH oral inhaler   Generic drug:  umeclidinium      Inhale 1 puff into the lungs daily        lactulose 10 GM/15ML solution    CHRONULAC     Take 20 g by mouth as needed for constipation        latanoprost 0.005 % ophthalmic solution    XALATAN    2.5 mL    Place 1 drop into both eyes At Bedtime    Primary open angle glaucoma, stage unspecified       levETIRAcetam 500 MG tablet    KEPPRA    180 tablet    Take 1 tablet (500 mg) by mouth 2 times daily    Nausea       lidocaine 5 % Patch    LIDODERM    30 patch    APPLY 1 TO 3 PATCHES TO PAINFUL AREA AT ONCE FOR UP TO 12 HOURS WITHIN A 24 HOUR PERIOD REMOVE AFTER 12 HOURS    Chronic pain syndrome, 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 (H)       lisinopril 10 MG tablet    PRINIVIL/ZESTRIL    90 tablet    Take 1 tablet (10 mg) by mouth daily    Essential hypertension with goal blood pressure less than 130/80       MEDICATION GIVEN BY INTRATHECAL PUMP - INSTRUCTION      continuous 2/8/18: Pump managed by [facility] in [place] [phone].  Conc: Bupivacaine 20 mg/mL and Fentanyl 2000 mcg/mL, morphine 2 mg/mL Continuous:  Fentanyl 796 mcg/day, Bupivacaine 7.96 mg/day, Morphine 0.796 mg/day  Boluses: Up to 7 boluses per 24-hr period of Bupivicaine 0.5994 mg and Fentanyl 59.9 mcg morphine 0.0599 mg. Pump Refill Date 02/28/18        metFORMIN 500 MG 24 hr tablet    GLUCOPHAGE-XR    90 tablet    Take 1 tablet (500 mg) by mouth daily (with dinner)    Type 2 diabetes mellitus with diabetic peripheral angiopathy and gangrene, without long-term current use of insulin (H)       metoprolol succinate 25 MG 24 hr tablet    TOPROL-XL    30 tablet    TAKE 1 TABLET (25 MG) BY MOUTH DAILY    Old myocardial infarction       MULTIVITAMIN PO      Take 1  tablet by mouth daily        nitroGLYcerin 0.4 MG sublingual tablet    NITROSTAT    25 tablet    Place 1 tablet (0.4 mg) under the tongue every 5 minutes as needed for chest pain if you are still having symptoms after 3 doses (15 minutes) call 911.    Chronic systolic congestive heart failure (H), Old myocardial infarction       omeprazole 20 MG CR capsule    priLOSEC          ondansetron 4 MG tablet    ZOFRAN          ondansetron 8 MG ODT tab    ZOFRAN-ODT    30 tablet    Take 1 tablet (8 mg) by mouth every 8 hours as needed    Other migraine without status migrainosus, not intractable       * order for DME     1 Month    Equipment being ordered: Depends briefs    Incontinence       * order for DME     1 Device    Equipment being ordered: Power Wheelchair    CVA (cerebral infarction), HTN (hypertension)       * order for DME     1 Box    Equipment being ordered: Glucerna daily shakes with each meal    Type 2 diabetes mellitus with other diabetic neurological complication       * order for DME     1 Units    Equipment being ordered: Nebulizer and tubing supplies    Simple chronic bronchitis (H)       * order for DME     1 Device    Equipment being ordered: CPAP supplies mask, hose, filters, cushion fax to [facility] at [phone] Disp: 10 DeviceRefills: prn Class: Local PrintStart: 2/10/2017    Chronic obstructive pulmonary disease, unspecified COPD type (H)       * order for DME     10 Device    Equipment being ordered: CPAP supplies mask, hose, filters, cushion  fax to [facility] at [phone]    COPD (chronic obstructive pulmonary disease) (H)       * order for DME     1 Device    Hospital bed with side rails    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 (H)       * order for DME     1 Device    Full electric hospital bed with half rails  Dx: N89804, I110, J449 Length of need: lifetime    Status post bilateral above knee amputation (H)       * order for DME     1 Device    Wheel Chair  Cushion: 18 x 18 inch Roho cushion    Status post bilateral above knee amputation (H)       pantoprazole 40 MG EC tablet    PROTONIX    30 tablet    Take 1 tablet (40 mg) by mouth daily    Gastroesophageal reflux disease without esophagitis       Phenytoin Sodium Extended 200 MG Caps     60 capsule    Take 200 mg by mouth 2 times daily    Seizure disorder (H)       polyethylene glycol Packet    MIRALAX/GLYCOLAX     Take 17 g by mouth daily Dissolved in water or juice        pregabalin 75 MG capsule    LYRICA    120 capsule    Take 2 capsules (150 mg) by mouth 2 times daily    Pain in both upper extremities       risperiDONE 0.5 MG tablet    risperDAL     Take 0.5 mg by mouth At Bedtime        sucralfate 1 GM tablet    CARAFATE    120 tablet    Take 1 tablet (1 g) by mouth 4 times daily May dissolve in 10 mL water is necessary. (Start upon completion of carafate suspension)    Adjustment disorder with depressed mood       * traZODone 100 MG tablet    DESYREL          * traZODone 50 MG tablet    DESYREL     Take 150 mg by mouth At Bedtime        vitamin D 2000 UNITS tablet     100 tablet    Take 2,000 Units by mouth daily.        zinc 50 MG Tabs      Take 1 tablet by mouth daily        * Notice:  This list has 13 medication(s) that are the same as other medications prescribed for you. Read the directions carefully, and ask your doctor or other care provider to review them with you.

## 2018-03-20 NOTE — NURSING NOTE
"Chief Complaint   Patient presents with     Vomiting       Initial /68  Pulse 73  Temp 98.5  F (36.9  C) (Oral)  Resp 15  Wt 123 lb (55.8 kg)  SpO2 95%  BMI 19.26 kg/m2 Estimated body mass index is 19.26 kg/(m^2) as calculated from the following:    Height as of 10/18/17: 5' 7\" (1.702 m).    Weight as of this encounter: 123 lb (55.8 kg).  Medication Reconciliation: complete   Daniela Hancock CMA      "

## 2018-03-20 NOTE — PROGRESS NOTES
SUBJECTIVE:   Sonya Foote is a 68 year old female who presents to clinic today for the following health issues:      Gastrointestinal symptoms      Duration: Occurred over the last couple of days- resolved now    Description:           Nausea, Headache, vomiting, light and noise sensitivity      Intensity:  moderate    Accompanying signs and symptoms:  chills    History  Previous {similar problem: YES- hx of migraines   Previous evaluation:  none    Aggravating factors: none    Alleviating factors: nothing    Other Therapies tried: Tylenol- no relief     Other concerns:  2. Patient will be moving to [place] this fall to be with sister.  Apparently she will be moving to [facility] to live with her sister.  She will be traveling by train in September so that she is able to bring her wheelchair with per  3. Requesting prescription to give to assisted living for Zofran       Problem list and histories reviewed & adjusted, as indicated.  Additional history: as documented    Patient Active Problem List   Diagnosis     Hyperlipidemia LDL goal <100     Seizure disorder (H)     ACP (advance care planning)     Osteoporosis     Schizoaffective disorder (H)     AS (sickle cell trait) (H)     Vertigo     Person who has had sex change operation     Claudication in peripheral vascular disease (H)     Intestinal malabsorption     GIB (gastrointestinal bleeding)     Cervicalgia     Health Care Home     Asthma     Adjustment disorder with depressed mood     Chronic pain syndrome     Open-angle glaucoma     Hx of colonic polyp     Old myocardial infarction     Iron deficiency anemia     Late effect of stroke     Degeneration of intervertebral disc of lumbosacral region     Thoracic or lumbosacral neuritis or radiculitis     Cerebral infarction due to occlusion or stenosis of carotid artery     Disorder of bone and cartilage     Hereditary and idiopathic peripheral neuropathy     Androgen insensitivity syndrome     PAD (peripheral artery  disease) (H)     Chronic systolic congestive heart failure (H)     Stenosis of carotid artery     Osteoarthritis     Pain in both upper extremities     Atherosclerotic peripheral vascular disease with rest pain (H)     Essential hypertension     Cellulitis of right ankle     Angina pectoris, crescendo (H)     Type 2 diabetes mellitus with diabetic peripheral angiopathy without gangrene, without long-term current use of insulin (H)     Anemia, unspecified type     Critical lower limb ischemia     Testicular feminization     Anxiety disorder due to general medical condition     Central retinal artery occlusion     Lumbosacral radiculitis     Peripheral neuropathy     Osteopenia     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 (H)     Primary open angle glaucoma of both eyes, severe stage     Pseudophakia of right eye     Cataract, left eye     Diabetes mellitus type 2 without retinopathy (H)     Pyelonephritis     Chronic obstructive pulmonary disease, unspecified COPD type (H)     JOSE (obstructive sleep apnea)     Complex sleep apnea syndrome     Coronary artery disease of native artery of native heart with stable angina pectoris (H)     Hyperlipidemia     Ischemic cardiomyopathy     Bee sting reaction, undetermined intent, subsequent encounter     Functional incontinence     Personal history of urinary tract infection     Dysphagia     Single seizure (H)     Essential hypertension with goal blood pressure less than 130/80     Hyperlipidemia LDL goal <70     UTI (urinary tract infection)     Food impaction of esophagus     Esophageal foreign body     Nausea & vomiting     Incontinence     Black tarry stools     Other migraine without status migrainosus, not intractable     Past Surgical History:   Procedure Laterality Date     AMPUTATE LEG ABOVE KNEE Left 6/11/2016    Procedure: AMPUTATE LEG ABOVE KNEE;  Surgeon: Mello Rodriguez MD;  [facility]     AMPUTATE LEG BELOW KNEE Right 11/7/2016     Procedure: AMPUTATE LEG BELOW KNEE;  Surgeon: Savannah Durant MD;  [facility]     AMPUTATE REVISION STUMP LOWER EXTREMITY Right 11/11/2016    Procedure: AMPUTATE REVISION STUMP LOWER EXTREMITY;  Surgeon: Savannah Durant MD;  [facility]     AMPUTATE REVISION STUMP LOWER EXTREMITY Right 11/16/2016    Procedure: AMPUTATE REVISION STUMP LOWER EXTREMITY;  Surgeon: Savannah Durant MD;  [facility]     AMPUTATE TOE(S) Right 1/5/2016    Procedure: AMPUTATE TOE(S);  Surgeon: Mello Gaines DPM;  Location: [facility]     ANGIOGRAM Bilateral 11/21/2014    Procedure: ANGIOGRAM;  Surgeon: Savannah Durant MD;  [facility]     ANGIOGRAM Left 1/16/2015    Procedure: ANGIOGRAM;  Surgeon: Savannah Durant MD;  [facility]     ANGIOGRAM Bilateral 9/14/2015    Procedure: ANGIOGRAM;  Surgeon: Savannah Durant MD;  [facility]     ANGIOGRAM Left 10/12/2015    Procedure: ANGIOGRAM;  Surgeon: Savannah Durant MD;  [facility]     ANGIOGRAM Right 6/6/2016    Procedure: ANGIOGRAM;  Surgeon: Savannah Durant MD;  [facility]     ANGIOPLASTY Right 6/6/2016    Procedure: ANGIOPLASTY;  Surgeon: Savannah Durant MD;  [facility]     APPENDECTOMY       BREAST SURGERY      right breast bx (benign)     CHOLECYSTECTOMY       COLONOSCOPY N/A 8/25/2014    Procedure: COLONOSCOPY;  Surgeon: Mello Ferrer MD;  [facility]     COLONOSCOPY WITH CO2 INSUFFLATION N/A 8/20/2014    Procedure: COLONOSCOPY WITH CO2 INSUFFLATION;  Surgeon: Duane, William Charles, MD;  Location: [facility]     CYSTOSTOMY, INSERT TUBE SUPRAPUBIC, COMBINED N/A 1/16/2018    Procedure: COMBINED CYSTOSTOMY, INSERT TUBE SUPRAPUBIC;  Cystoscopy, Intraoperative Ultrasound, Suprapubic Tube Placement;  Surgeon: Keanu Dawson MD;  [facility]     ENDARTERECTOMY FEMORAL  5/23/2014    Procedure: ENDARTERECTOMY FEMORAL;  Surgeon: Jason Joshi MD;  [facility]     ESOPHAGOSCOPY, GASTROSCOPY, DUODENOSCOPY (EGD), COMBINED  12/14/2012    Procedure:  COMBINED ESOPHAGOSCOPY, GASTROSCOPY, DUODENOSCOPY (EGD), BIOPSY SINGLE OR MULTIPLE;  ESOPHAGOSCOPY, GASTROSCOPY, DUODENOSCOPY (EGD), DILATATION ;  Surgeon: Elizabeth Stevenson MD;  Location:  [facility]     ESOPHAGOSCOPY, GASTROSCOPY, DUODENOSCOPY (EGD), COMBINED  12/31/2013    Procedure: COMBINED ESOPHAGOSCOPY, GASTROSCOPY, DUODENOSCOPY (EGD), BIOPSY SINGLE OR MULTIPLE;;  Surgeon: Clemente Lopez MD;  Location:  [facility]     ESOPHAGOSCOPY, GASTROSCOPY, DUODENOSCOPY (EGD), COMBINED  4/1/2014    Procedure: COMBINED ESOPHAGOSCOPY, GASTROSCOPY, DUODENOSCOPY (EGD);;  Surgeon: Clemente Lopez MD;  Location:  [facility]     ESOPHAGOSCOPY, GASTROSCOPY, DUODENOSCOPY (EGD), COMBINED  6/28/2014    Procedure: COMBINED ESOPHAGOSCOPY, GASTROSCOPY, DUODENOSCOPY (EGD);  Surgeon: Clemente Lopez MD;  Location:  [facility]     ESOPHAGOSCOPY, GASTROSCOPY, DUODENOSCOPY (EGD), COMBINED N/A 8/20/2014    Procedure: COMBINED ESOPHAGOSCOPY, GASTROSCOPY, DUODENOSCOPY (EGD), BIOPSY SINGLE OR MULTIPLE;  Surgeon: Duane, William Charles, MD;  Location: [facility]     ESOPHAGOSCOPY, GASTROSCOPY, DUODENOSCOPY (EGD), COMBINED N/A 8/22/2014    Procedure: COMBINED ESOPHAGOSCOPY, GASTROSCOPY, DUODENOSCOPY (EGD), BIOPSY SINGLE OR MULTIPLE;  Surgeon: Mello Ferrer MD;  Location:  [facility]     ESOPHAGOSCOPY, GASTROSCOPY, DUODENOSCOPY (EGD), COMBINED N/A 10/2/2014    Procedure: COMBINED ESOPHAGOSCOPY, GASTROSCOPY, DUODENOSCOPY (EGD), BIOPSY SINGLE OR MULTIPLE;  Surgeon: Remy Haskins MD;  Location:  GI     ESOPHAGOSCOPY, GASTROSCOPY, DUODENOSCOPY (EGD), COMBINED Left 12/15/2014    Procedure: COMBINED ESOPHAGOSCOPY, GASTROSCOPY, DUODENOSCOPY (EGD), BIOPSY SINGLE OR MULTIPLE;  Surgeon: Remy Haskins MD;  Location:  [facility]     ESOPHAGOSCOPY, GASTROSCOPY, DUODENOSCOPY (EGD), COMBINED N/A 2/25/2015    Procedure: COMBINED ENDOSCOPIC ULTRASOUND, ESOPHAGOSCOPY, GASTROSCOPY, DUODENOSCOPY (EGD), FINE NEEDLE ASPIRATE/BIOPSY;  Surgeon: Clemente Lugo MD;  Location:  GI      ESOPHAGOSCOPY, GASTROSCOPY, DUODENOSCOPY (EGD), COMBINED Left 2/25/2015    Procedure: COMBINED ESOPHAGOSCOPY, GASTROSCOPY, DUODENOSCOPY (EGD), BIOPSY SINGLE OR MULTIPLE;  Surgeon: Clemente Lugo MD;  [facility]     ESOPHAGOSCOPY, GASTROSCOPY, DUODENOSCOPY (EGD), COMBINED N/A 9/25/2016    Procedure: COMBINED ESOPHAGOSCOPY, GASTROSCOPY, DUODENOSCOPY (EGD);  Surgeon: Aziza Patiño MD;  [facility]     ESOPHAGOSCOPY, GASTROSCOPY, DUODENOSCOPY (EGD), COMBINED N/A 1/18/2017    Procedure: COMBINED ESOPHAGOSCOPY, GASTROSCOPY, DUODENOSCOPY (EGD), BIOPSY SINGLE OR MULTIPLE;  Surgeon: Clemente Lopez MD;  [facility]     ESOPHAGOSCOPY, GASTROSCOPY, DUODENOSCOPY (EGD), COMBINED N/A 11/26/2017    Procedure: COMBINED ESOPHAGOSCOPY, GASTROSCOPY, DUODENOSCOPY (EGD), REMOVE FOREIGN BODY;  Esophagogastroduodenoscopy with foreign body extraction  ;  Surgeon: Herberth Castrejon MD;  [facility]     ESOPHAGOSCOPY, GASTROSCOPY, DUODENOSCOPY (EGD), COMBINED N/A 11/26/2017    Procedure: COMBINED ESOPHAGOSCOPY, GASTROSCOPY, DUODENOSCOPY (EGD), REMOVE FOREIGN BODY;;  Surgeon: Herberth Castrejon MD;  [facility]     FASCIOTOMY LOWER EXTREMITY Left 6/10/2016    Procedure: FASCIOTOMY LOWER EXTREMITY;  Surgeon: Mello Rodriguez MD;  [facility]     HC CAPSULE ENDOSCOPY N/A 8/25/2014    Procedure: CAPSULE/PILL CAM ENDOSCOPY;  Surgeon: Remy Haskins MD;  [facility]     HC CAPSULE ENDOSCOPY N/A 10/2/2014    Procedure: CAPSULE/PILL CAM ENDOSCOPY;  Surgeon: Remy Haskins MD;  [facility]     ORTHOPEDIC SURGERY      broken wrist repair     SEX TRANSFORMATION SURGERY, MALE TO FEMALE      1974     SINUS SURGERY      cyst removed     TONSILLECTOMY       VASCULAR SURGERY      Left carotid stent       Social History   Substance Use Topics     Smoking status: Former Smoker     Packs/day: 2.50     Years: 30.00     Types: Cigarettes, Cigars     Quit date: 11/1/2001     Smokeless tobacco:  Never Used     Alcohol use No     Family History   Problem Relation Age of Onset     Dementia Mother      Glaucoma Mother      DIABETES Mother      may have been type 1, childhood     Coronary Artery Disease Mother      MI     Glaucoma Father      DIABETES Father      may hev been type 1 - ??     Heart Failure Father      CANCER Maternal Aunt      leukemia     Schizophrenia Brother      Depression Brother      Suicide Sister      Depression Sister      DIABETES Sister      CANCER Maternal Aunt      ovarian     Glaucoma Maternal Grandmother      DIABETES Maternal Grandmother      Glaucoma Maternal Grandfather      DIABETES Maternal Grandfather      Glaucoma Paternal Grandmother      DIABETES Paternal Grandmother      Glaucoma Paternal Grandfather      DIABETES Paternal Grandfather      Breast Cancer Sister      CEREBROVASCULAR DISEASE Brother      Colon Cancer No family hx of          Current Outpatient Prescriptions   Medication Sig Dispense Refill     ondansetron (ZOFRAN-ODT) 8 MG ODT tab Take 1 tablet (8 mg) by mouth every 8 hours as needed 30 tablet 5     ADVAIR DISKUS 250-50 MCG/DOSE diskus inhaler Inhale 1 puff into the lungs 2 times daily 60 Inhaler 11     pantoprazole (PROTONIX) 40 MG EC tablet Take 1 tablet (40 mg) by mouth daily 30 tablet 5     VENTOLIN  (90 BASE) MCG/ACT Inhaler        clopidogrel (PLAVIX) 75 MG tablet        cyclobenzaprine (FLEXERIL) 10 MG tablet        dorzolamide-timolol (COSOPT) 2-0.5 % ophthalmic solution        fluconazole (DIFLUCAN) 100 MG tablet        omeprazole (PRILOSEC) 20 MG CR capsule        ondansetron (ZOFRAN) 4 MG tablet        traZODone (DESYREL) 100 MG tablet        pregabalin (LYRICA) 75 MG capsule Take 2 capsules (150 mg) by mouth 2 times daily 120 capsule 5     lidocaine (LIDODERM) 5 % Patch APPLY 1 TO 3 PATCHES TO PAINFUL AREA AT ONCE FOR UP TO 12 HOURS WITHIN A 24 HOUR PERIOD REMOVE AFTER 12 HOURS 30 patch 5     brimonidine (ALPHAGAN) 0.2 % ophthalmic  solution Place 1 drop into the right eye 2 times daily       HYDROMORPHONE HCL PO Take 2 mg by mouth every 3 hours as needed for moderate to severe pain       escitalopram (LEXAPRO) 10 MG tablet Take 10 mg by mouth daily        traZODone (DESYREL) 50 MG tablet Take 150 mg by mouth At Bedtime        lactulose (CHRONULAC) 10 GM/15ML solution Take 20 g by mouth as needed for constipation       Prochlorperazine Maleate (COMPAZINE PO) Take 10 mg by mouth every 8 hours as needed for nausea       ESTRACE VAGINAL 0.1 MG/GM cream USE DIME SIZE AMOUNT 3X A WEEK AT NIGHT 42.5 g 11     metFORMIN (GLUCOPHAGE-XR) 500 MG 24 hr tablet Take 1 tablet (500 mg) by mouth daily (with dinner) 90 tablet 3     sucralfate (CARAFATE) 1 GM tablet Take 1 tablet (1 g) by mouth 4 times daily May dissolve in 10 mL water is necessary. (Start upon completion of carafate suspension) 120 tablet 11     umeclidinium (INCRUSE ELLIPTA) 62.5 MCG/INH oral inhaler Inhale 1 puff into the lungs daily       blood glucose monitoring (ONE TOUCH ULTRASOFT) lancets Use to test blood sugar 3 times daily or as directed. 100 each PRN     blood glucose monitoring (ONE TOUCH ULTRA) test strip Use to test blood sugars 3 times daily or as directed. 3 Box 3     metoprolol (TOPROL-XL) 25 MG 24 hr tablet TAKE 1 TABLET (25 MG) BY MOUTH DAILY 30 tablet 10     order for [facility] Full electric hospital bed with half rails    Dx: Q47816, I110, J449  Length of need: lifetime 1 Device 0     order for [facility] Wheel Chair Cushion: 18 x 18 inch Roho cushion 1 Device 0     order for [facility] Hospital bed with side rails 1 Device 0     polyethylene glycol (MIRALAX/GLYCOLAX) Packet Take 17 g by mouth daily Dissolved in water or juice        ACETAMINOPHEN PO Take 1,000 mg by mouth every 4 hours as needed for fever Not to exceed 4000 mg/day       cetirizine (ZYRTEC) 10 MG tablet Take 1 tablet (10 mg) by mouth daily as needed for allergies 90 tablet 3     diclofenac (VOLTAREN) 1 % GEL topical gel Apply 2  g topically 4 times daily to hands 100 g 11     EPINEPHrine (EPIPEN JR) 0.15 MG/0.3ML injection Inject 0.3 mLs (0.15 mg) into the muscle as needed for anaphylaxis 0.6 mL 1     lisinopril (PRINIVIL/ZESTRIL) 10 MG tablet Take 1 tablet (10 mg) by mouth daily 90 tablet 3     risperiDONE (RISPERDAL) 0.5 MG tablet Take 0.5 mg by mouth At Bedtime       ferrous sulfate (IRON) 325 (65 FE) MG tablet Take 1 tablet (325 mg) by mouth 2 times daily With meals 60 tablet 2     order for DME Equipment being ordered: CPAP supplies mask, hose, filters, cushion    fax to [facility] at [phone] 10 Device prn     order for DME Equipment being ordered: CPAP supplies mask, hose, filters, cushion fax to [facility] at [phone]  Disp: 10 Device Refills: prn   Class: Local Print Start: 2/10/2017 1 Device 0     order for DME Equipment being ordered: Nebulizer and tubing supplies 1 Units 0     albuterol (2.5 MG/3ML) 0.083% neb solution INHALE 1 VIAL VIA NEBULIZER EVERY 6 HOURS AS NEEDED 360 mL 11     CYANOCOBALAMIN PO Take 2,000 mcg by mouth daily       fluticasone (FLONASE) 50 MCG/ACT nasal spray Spray 1 spray into both nostrils daily       calcium citrate-vitamin D (CITRACAL) 315-250 MG-UNIT TABS Take 2 tablets by mouth daily 120 tablet 5     hydrochlorothiazide (MICROZIDE) 12.5 MG capsule Take 1 capsule (12.5 mg) by mouth daily (Patient taking differently: Take 12.5 mg by mouth daily Hold for sbp<90) 90 capsule 3     estradiol (ESTRACE) 1 MG tablet Take 1 tablet (1 mg) by mouth daily 90 tablet 3     levETIRAcetam (KEPPRA) 500 MG tablet Take 1 tablet (500 mg) by mouth 2 times daily 180 tablet 1     aspirin EC 81 MG EC tablet Take 1 tablet (81 mg) by mouth daily 90 tablet 3     blood glucose monitoring (ONE TOUCH ULTRA 2) meter device kit Use to test blood sugars 3 times daily or as directed. 1 kit 0     phenytoin 200 MG CAPS Take 200 mg by mouth 2 times daily (Patient taking differently: Take 200 mg by mouth 2 times daily  (takes at 8 AM and 8 PM)) 60 capsule 0     dorzolamide (TRUSOPT) 2 % ophthalmic solution Place 1 drop into the right eye 2 times daily        zinc 50 MG TABS Take 1 tablet by mouth daily       nitroglycerin (NITROSTAT) 0.4 MG SL tablet Place 1 tablet (0.4 mg) under the tongue every 5 minutes as needed for chest pain if you are still having symptoms after 3 doses (15 minutes) call 911. 25 tablet 1     MEDICATION GIVEN BY INTRATHECAL PUMP - INSTRUCTION continuous 2/8/18: Pump managed by [facility] in [place] [phone].   Conc: Bupivacaine 20 mg/mL and Fentanyl 2000 mcg/mL, morphine 2 mg/mL  Continuous:  Fentanyl 796 mcg/day, Bupivacaine 7.96 mg/day, Morphine 0.796 mg/day   Boluses: Up to 7 boluses per 24-hr period of Bupivicaine 0.5994 mg and Fentanyl 59.9 mcg morphine 0.0599 mg.  Pump Refill Date 02/28/18       latanoprost (XALATAN) 0.005 % ophthalmic solution Place 1 drop into both eyes At Bedtime 2.5 mL 11     atorvastatin (LIPITOR) 40 MG tablet Take 1 tablet (40 mg) by mouth daily (Patient taking differently: Take 40 mg by mouth At Bedtime ) 90 tablet 3     order for DME Equipment being ordered: Glucerna daily shakes with each meal 1 Box 11     ORDER FOR DME Equipment being ordered: Power Wheelchair 1 Device 0     ORDER FOR DME Equipment being ordered: Depends briefs 1 Month 11     Cholecalciferol (VITAMIN D) 2000 UNITS tablet Take 2,000 Units by mouth daily. 100 tablet 3     Multiple Vitamin (MULTIVITAMIN OR) Take 1 tablet by mouth daily        acetaminophen-codeine (TYLENOL #3) 300-30 MG per tablet        ciprofloxacin (CIPRO) 500 MG tablet        Allergies   Allergen Reactions     Bee Venom      Penicillins Anaphylaxis     Other reaction(s): Skin Rash and/or Hives     Dilantin [Phenytoin] Other (See Comments)     Generic dilantin only per pt     Iodide Hives     09/11/15: Pt states she can use iodine and doesn't have any problems      Iodine-131      Novocaine [Procaine] Hives      Other reaction(s): Skin Rash and/or Hives     Tositumomab      Recent Labs   Lab Test  02/09/18   0600  02/08/18   0843   12/06/17   0832  12/05/17   1950   08/24/17   0955   01/24/17   1156   11/04/16   1145   06/06/16   0648   09/15/15   0749   07/22/14   1209   A1C   --    --    --    --    --    --   4.5   --   4.1*   --    --    --   4.9   --    --    < >   --    LDL   --    --    --    --    --    --    --    --    --    --    --    --   77   --   71   --   104   HDL   --    --    --    --    --    --    --    --    --    --    --    --   65   --   49*   --   50*   TRIG   --    --    --    --    --    --    --    --    --    --    --    --   62   --   62   --   124   ALT   --   60*   --   45  43   < >   --    < >  35   --    --    < >   --    < >  58*   < >  51*   CR  0.82  0.43*   < >  0.43*  0.46*   < >   --    < >  0.45*   < >  0.60   < >   --    < >  0.60   < >  0.67   GFRESTIMATED  69  >90   < >  >90  >90   < >   --    < >  >90  Non  GFR Calc     < >  >90  Non  GFR Calc     < >   --    < >  >90  Non  GFR Calc     < >  88   GFRESTBLACK  84  >90   < >  >90  >90   < >   --    < >  >90   GFR Calc     < >  >90   GFR Calc     < >   --    < >  >90   GFR Calc     < >  >90   POTASSIUM  3.4  4.0   < >  3.6  3.3*   < >   --    < >  3.9   < >  4.1   < >   --    < >  3.8   < >  3.6   TSH   --    --    --    --    --    --    --    --    --    --   0.84   --    --    --    --    --   1.68    < > = values in this interval not displayed.      BP Readings from Last 3 Encounters:   03/20/18 112/68   03/01/18 93/60   02/19/18 127/83    Wt Readings from Last 3 Encounters:   03/20/18 123 lb (55.8 kg)   03/01/18 123 lb (55.8 kg)   02/13/18 123 lb (55.8 kg)              Reviewed and updated as needed this visit by clinical staff  Allergies       Reviewed and updated as needed this visit by Provider         ROS:  CONSTITUTIONAL:  NEGATIVE for fever, chills, change in weight  ENT/MOUTH: NEGATIVE for ear, mouth and throat problems  RESP: NEGATIVE for significant cough or SOB  CV: NEGATIVE for chest pain, palpitations or peripheral edema  GI: NEGATIVE for nausea, abdominal pain, heartburn, or change in bowel habits  : NEGATIVE for frequency, dysuria, or hematuria  MUSCULOSKELETAL: NEGATIVE for significant arthralgias or myalgia  HEME: NEGATIVE for bleeding problems    OBJECTIVE:                                                    /68  Pulse 73  Temp 98.5  F (36.9  C) (Oral)  Resp 15  Wt 123 lb (55.8 kg)  SpO2 95%  BMI 19.26 kg/m2  Body mass index is 19.26 kg/(m^2).  GENERAL:  alert and no distress  EYES: Eyes grossly normal to inspection, extraocular movements - intact, and PERRL  HENT: ear canals- normal; TMs- normal; Nose- normal; Mouth- no ulcers, no lesions  NECK: no tenderness, no adenopathy, no asymmetry, no masses, no stiffness; thyroid- normal to palpation  RESP: lungs clear to auscultation - no rales, no rhonchi, no wheezes  CV: regular rates and rhythm, normal S1 S2, no S3 or S4 and no click or rub   MS: IN WHEELCHAIR with bilateral AKA's  PSYCH: Alert and oriented times 3; speech- coherent , normal rate and volume; able to articulate logical thoughts, able to abstract reason, no tangential thoughts, no hallucinations or delusions, affect- normal     ASSESSMENT/PLAN:                                                      (G43.714) Other migraine without status migrainosus, not intractable  (primary encounter diagnosis)  Comment: We will refill her Zofran for as needed use for nausea prophylaxis in the setting of her migraines.  This seems to control her symptoms fairly well per  Plan: ondansetron (ZOFRAN-ODT) 8 MG ODT tab            (J44.9) Chronic obstructive pulmonary disease, unspecified COPD type (H)  Comment: Continue with Advair inhaler on a daily basis with as needed Ventolin as needed  Plan: VENTOLIN  (90  BASE) MCG/ACT Inhaler            (G40.909) Seizure disorder (H)  Comment: Stable without recurrent symptoms per  Plan: Tinea with medical management    (G89.4) Chronic pain syndrome  Comment: Again stable on baseline current therapy no changes recommended  Plan:         See Patient Instructions    Allan Casey MD  [facility]    THE MEDICATION LIST HAS BEEN FULLY RECONCILED BY THE M.D. AND THE NURSING STAFF.    25 minutes spent with this patient, face to face, discussing treatment options for listed problems above as well as side effects of appropriate medications.  Counseling time extended beyond 50% of the clinic visit.  Medication dosing, treatment plan and follow-up were discussed. Also reviewed need for primary care testing for patient.

## 2018-03-29 ENCOUNTER — MEDICAL CORRESPONDENCE (OUTPATIENT)
Dept: HEALTH INFORMATION MANAGEMENT | Facility: CLINIC | Age: 68
End: 2018-03-29

## 2018-03-29 ENCOUNTER — OFFICE VISIT (OUTPATIENT)
Dept: OPHTHALMOLOGY | Facility: CLINIC | Age: 68
End: 2018-03-29
Attending: OPHTHALMOLOGY
Payer: COMMERCIAL

## 2018-03-29 DIAGNOSIS — Z96.1 PSEUDOPHAKIA OF RIGHT EYE: ICD-10-CM

## 2018-03-29 DIAGNOSIS — H40.1133 PRIMARY OPEN ANGLE GLAUCOMA OF BOTH EYES, SEVERE STAGE: Primary | ICD-10-CM

## 2018-03-29 PROCEDURE — 92083 EXTENDED VISUAL FIELD XM: CPT | Mod: ZF | Performed by: OPHTHALMOLOGY

## 2018-03-29 PROCEDURE — G0463 HOSPITAL OUTPT CLINIC VISIT: HCPCS | Mod: ZF

## 2018-03-29 RX ORDER — DORZOLAMIDE HYDROCHLORIDE AND TIMOLOL MALEATE 20; 5 MG/ML; MG/ML
1 SOLUTION/ DROPS OPHTHALMIC 2 TIMES DAILY
Qty: 1 BOTTLE | Refills: 11 | Status: ON HOLD | OUTPATIENT
Start: 2018-03-29 | End: 2018-05-02

## 2018-03-29 RX ORDER — LATANOPROST 50 UG/ML
1 SOLUTION/ DROPS OPHTHALMIC AT BEDTIME
Qty: 2.5 ML | Refills: 11 | Status: SHIPPED | OUTPATIENT
Start: 2018-03-29 | End: 2020-10-06

## 2018-03-29 RX ORDER — DORZOLAMIDE HCL 20 MG/ML
1 SOLUTION/ DROPS OPHTHALMIC 2 TIMES DAILY
Qty: 1 BOTTLE | Refills: 11 | Status: SHIPPED | OUTPATIENT
Start: 2018-03-29 | End: 2020-08-14

## 2018-03-29 RX ORDER — LATANOPROST 50 UG/ML
1 SOLUTION/ DROPS OPHTHALMIC AT BEDTIME
Qty: 2.5 ML | Refills: 11 | Status: CANCELLED | OUTPATIENT
Start: 2018-03-29

## 2018-03-29 RX ORDER — BRIMONIDINE TARTRATE 2 MG/ML
1 SOLUTION/ DROPS OPHTHALMIC 2 TIMES DAILY
Qty: 1 BOTTLE | Refills: 11 | Status: SHIPPED | OUTPATIENT
Start: 2018-03-29 | End: 2020-08-14

## 2018-03-29 ASSESSMENT — CONF VISUAL FIELD
OS_INFERIOR_NASAL_RESTRICTION: 1
OS_SUPERIOR_TEMPORAL_RESTRICTION: 1
OD_SUPERIOR_TEMPORAL_RESTRICTION: 1
OD_SUPERIOR_NASAL_RESTRICTION: 3
OS_INFERIOR_TEMPORAL_RESTRICTION: 1
OD_INFERIOR_TEMPORAL_RESTRICTION: 1
OS_SUPERIOR_NASAL_RESTRICTION: 1
OD_INFERIOR_NASAL_RESTRICTION: 1

## 2018-03-29 ASSESSMENT — VISUAL ACUITY
CORRECTION_TYPE: GLASSES
METHOD: SNELLEN - LINEAR
OS_SC: NLP
OD_SC: 20/70
OD_SC+: -2

## 2018-03-29 ASSESSMENT — EXTERNAL EXAM - LEFT EYE: OS_EXAM: NORMAL

## 2018-03-29 ASSESSMENT — CUP TO DISC RATIO
OD_RATIO: 0.8
OS_RATIO: 0.9

## 2018-03-29 ASSESSMENT — REFRACTION_WEARINGRX
OS_ADD: +3.00
OS_CYLINDER: SPHERE
SPECS_TYPE: TRIFOCAL
OS_SPHERE: -1.00
OD_CYLINDER: SPHERE
OD_ADD: +3.00
OD_SPHERE: -1.00

## 2018-03-29 ASSESSMENT — SLIT LAMP EXAM - LIDS
COMMENTS: NORMAL
COMMENTS: NORMAL

## 2018-03-29 ASSESSMENT — TONOMETRY
OD_IOP_MMHG: 09
IOP_METHOD: TONOPEN
OS_IOP_MMHG: 14

## 2018-03-29 ASSESSMENT — EXTERNAL EXAM - RIGHT EYE: OD_EXAM: NORMAL

## 2018-03-29 NOTE — PATIENT INSTRUCTIONS
Patient will continue on Cosopt (Timolol/Dorzolamide) which is a blue top drop 2x/day (12 hours apart) in the RIGHT EYE ONLY, Latanoprost which is a teal top drop at bedtime in both eyes, and Alphagan (Brimonidine) which is a purple top drop 2x/day (12 hours apart) in the RIGHT EYE ONLY.  Patient will return to clinic in 3-4 months for repeat IOP check.  Patient will also follow up with Manuela Mitchell for low vision evaluation.    Patient will start warm compresses 2x/day, increase dietary flax seed/fish oil dietary supplementation, and start artificial tears 4-6x/day as needed for burning, tearing, mattering, foreign body sensation, blurred vision, etc.  Artifical tear drops are available over the counter. Below are a list of the some of the drops available:  Refresh   Systane  Theratears  Genteal  Blink  Optive      Please avoid Visine (unless Visine Puretears), Murine or Cleareyes or Puralube tear drops.    These have ingredients that take the red out and but actually increase dryness and redness of the eyes over time

## 2018-03-29 NOTE — MR AVS SNAPSHOT
After Visit Summary   3/29/2018    Sonya Foote    MRN: [id number]           Patient Information     Date Of Birth          1950        Visit Information        Provider Department      3/29/2018 7:45 AM Marely Robin MD [facility]        Today's Diagnoses     Primary open angle glaucoma of both eyes, severe stage    -  1    Pseudophakia of right eye          Care Instructions    Patient will continue on Cosopt (Timolol/Dorzolamide) which is a blue top drop 2x/day (12 hours apart) in the RIGHT EYE ONLY, Latanoprost which is a teal top drop at bedtime in both eyes, and Alphagan (Brimonidine) which is a purple top drop 2x/day (12 hours apart) in the RIGHT EYE ONLY.  Patient will return to clinic in 3-4 months for repeat IOP check.  Patient will also follow up with Manuela Mitchell for low vision evaluation.    Patient will start warm compresses 2x/day, increase dietary flax seed/fish oil dietary supplementation, and start artificial tears 4-6x/day as needed for burning, tearing, mattering, foreign body sensation, blurred vision, etc.  Artifical tear drops are available over the counter. Below are a list of the some of the drops available:  Refresh   Systane  Theratears  Genteal  Blink  Optive      Please avoid Visine (unless Visine Puretears), Murine or Cleareyes or Puralube tear drops.    These have ingredients that take the red out and but actually increase dryness and redness of the eyes over time            Follow-ups after your visit        Follow-up notes from your care team     Return 3-4 months for repeat IOP check and Manuela Mitchell for low vision evaluation..      Your next 10 appointments already scheduled     Apr 02, 2018 10:00 AM CDT   US CAROTID BILATERAL with [id number]   [email or web address])    [address] [address] [phone]           Please bring a list of your medicines (including vitamins, minerals and  over-the-counter drugs). Also, tell your doctor about any allergies you may have. Wear comfortable clothes and leave your valuables at home.  You do not need to do anything special to prepare for your exam.  Please call the Imaging Department at your exam site with any questions.            Apr 19, 2018 11:00 AM CDT   L/Vision Eval with Manuela Mitchell OT   [facility] [place], Occupational Therapy, Vision - Outpatie ([facility] [place])    [address]  9th Floor, Clinic [address]a  [facility] [address]   [phone]            May 04, 2018 10:20 AM CDT   (Arrive by 10:05 AM)   RETURN DIABETES with Michelle Irizarry MD   [facility] ([facility])    [address]  3rd Floor  [facility] [address]   [phone]            Jun 04, 2018  9:30 AM CDT   (Arrive by 9:15 AM)   Return Visit with Alina Jennings MD   [facility] ([facility] [place])    [address]  [address]  [facility] [address] [phone]            Jun 26, 2018 10:45 AM CDT   Return Visit with Bj Anderson MD   [facility] ([facility])    [address] [place] [address] [address]  [facility] [address] [phone] OPT 2            Jul 26, 2018 10:15 AM CDT   RETURN GLAUCOMA with Marely Robin MD   [facility] ([facility])    [address]  [address] [address]  [facility] [address]   [phone]              Who to contact     Please call your clinic at [phone] to:    Ask questions about your health    Make or cancel appointments    Discuss your medicines    Learn about your test results    Speak to your doctor            Additional Information About Your Visit        MyChart Information     [employer] is an electronic gateway that provides easy, online access to your medical records. With  Mo[employer], you can request a clinic appointment, read your test results, renew a prescription or communicate with your care team.     To sign up for Mo[employer] visit the website at [email or web address]   You will be asked to enter the access code listed below, as well as some personal information. Please follow the directions to create your username and password.     Your access code is: [email or web address]  Expires: 2018 10:57 AM     Your access code will  in 90 days. If you need help or a new code, please contact your [facility] or call [phone] for assistance.        Care EveryWhere ID     This is your Care EveryWhere ID. This could be used by other organizations to access your [facility] medical records  [id number]         Blood Pressure from Last 3 Encounters:   18 112/68   18 93/60   18 127/83    Weight from Last 3 Encounters:   18 55.8 kg (123 lb)   18 55.8 kg (123 lb)   18 55.8 kg (123 lb)              We Performed the Following     Low Vision [place]          Today's Medication Changes          These changes are accurate as of 3/29/18  9:21 AM.  If you have any questions, ask your nurse or doctor.               These medicines have changed or have updated prescriptions.        Dose/Directions    atorvastatin 40 MG tablet   Commonly known as:  LIPITOR   This may have changed:  when to take this   Used for:  Hyperlipidemia LDL goal <100        Dose:  40 mg   Take 1 tablet (40 mg) by mouth daily   Quantity:  90 tablet   Refills:  3       * dorzolamide-timolol 2-0.5 % ophthalmic solution   Commonly known as:  COSOPT   This may have changed:  Another medication with the same name was added. Make sure you understand how and when to take each.   Changed by:  Marely Robin MD        Refills:  0       * dorzolamide-timolol 2-0.5 % ophthalmic solution   Commonly known as:  COSOPT   This may have changed:  You were already taking a  medication with the same name, and this prescription was added. Make sure you understand how and when to take each.   Used for:  Primary open angle glaucoma of both eyes, severe stage   Changed by:  Marely Robin MD        Dose:  1 drop   Place 1 drop into the right eye 2 times daily   Quantity:  1 Bottle   Refills:  11       hydrochlorothiazide 12.5 MG capsule   Commonly known as:  MICROZIDE   This may have changed:  additional instructions   Used for:  Essential hypertension with goal blood pressure less than 130/80        Dose:  12.5 mg   Take 1 capsule (12.5 mg) by mouth daily   Quantity:  90 capsule   Refills:  3       Phenytoin Sodium Extended 200 MG Caps   This may have changed:  additional instructions   Used for:  Seizure disorder (H)        Dose:  200 mg   Take 200 mg by mouth 2 times daily   Quantity:  60 capsule   Refills:  0       * Notice:  This list has 2 medication(s) that are the same as other medications prescribed for you. Read the directions carefully, and ask your doctor or other care provider to review them with you.         Where to get your medicines      Some of these will need a paper prescription and others can be bought over the counter.  Ask your nurse if you have questions.     Bring a paper prescription for each of these medications     brimonidine 0.2 % ophthalmic solution    dorzolamide 2 % ophthalmic solution    dorzolamide-timolol 2-0.5 % ophthalmic solution    latanoprost 0.005 % ophthalmic solution                Primary Care Provider Office Phone # Fax #    Allan Casey [phone]       [address] [place] [address]        Equal Access to Services     IRAJ CARREON AH: Hadii shaheen youo Sojoe, waaxda luqadaha, qaybta kaalmada adeegan, tonie marroquin. So [facility] [phone].    ATENCIÓN: Si habla longañol, tiene a briones disposición servicios gratuitos de asistencia lingüística. Llame al [phone].    We comply  with applicable federal civil rights laws and [place] laws. We do not discriminate on the basis of race, color, national origin, age, disability, sex, sexual orientation, or gender identity.            Thank you!     Thank you for choosing [facility]  for your care. Our goal is always to provide you with excellent care. Hearing back from our patients is one way we can continue to improve our services. Please take a few minutes to complete the written survey that you may receive in the mail after your visit with us. Thank you!             Your Updated Medication List - Protect others around you: Learn how to safely use, store and throw away your medicines at www.disposemymeds.org.          This list is accurate as of 3/29/18  9:21 AM.  Always use your most recent med list.                   Brand Name Dispense Instructions for use Diagnosis    ACETAMINOPHEN PO      Take 1,000 mg by mouth every 4 hours as needed for fever Not to exceed 4000 mg/day        acetaminophen-codeine 300-30 MG per tablet    TYLENOL #3          ADVAIR DISKUS 250-50 MCG/DOSE diskus inhaler   Generic drug:  fluticasone-salmeterol     60 Inhaler    Inhale 1 puff into the lungs 2 times daily    Acute bronchitis       * albuterol (2.5 MG/3ML) 0.083% neb solution     360 mL    INHALE 1 VIAL VIA NEBULIZER EVERY 6 HOURS AS NEEDED    Chronic obstructive pulmonary disease, unspecified COPD type (H)       * VENTOLIN  (90 BASE) MCG/ACT Inhaler   Generic drug:  albuterol     3 Inhaler    Inhale 2 puffs into the lungs every 6 hours as needed for shortness of breath / dyspnea or wheezing    Chronic obstructive pulmonary disease, unspecified COPD type (H)       aspirin 81 MG EC tablet     90 tablet    Take 1 tablet (81 mg) by mouth daily    Unstable angina (H)       atorvastatin 40 MG tablet    LIPITOR    90 tablet    Take 1 tablet (40 mg) by mouth daily    Hyperlipidemia LDL goal <100       blood glucose monitoring lancets     100 each    Use to  test blood sugar 3 times daily or as directed.    Type 2 diabetes mellitus with diabetic peripheral angiopathy without gangrene, without long-term current use of insulin (H)       blood glucose monitoring meter device kit     1 kit    Use to test blood sugars 3 times daily or as directed.    Type 2 diabetes, HbA1C goal < 8% (H)       blood glucose monitoring test strip    ONETOUCH ULTRA    3 Box    Use to test blood sugars 3 times daily or as directed.    Type 2 diabetes mellitus with diabetic peripheral angiopathy without gangrene, without long-term current use of insulin (H)       brimonidine 0.2 % ophthalmic solution    ALPHAGAN    1 Bottle    Place 1 drop into the right eye 2 times daily    Primary open angle glaucoma of both eyes, severe stage       calcium citrate-vitamin D 315-250 MG-UNIT Tabs per tablet    CITRACAL    120 tablet    Take 2 tablets by mouth daily    Osteoporosis       cetirizine 10 MG tablet    zyrTEC    90 tablet    Take 1 tablet (10 mg) by mouth daily as needed for allergies    Seasonal allergic rhinitis, unspecified allergic rhinitis trigger       ciprofloxacin 500 MG tablet    CIPRO          clopidogrel 75 MG tablet    PLAVIX          COMPAZINE PO      Take 10 mg by mouth every 8 hours as needed for nausea        CYANOCOBALAMIN PO      Take 2,000 mcg by mouth daily        cyclobenzaprine 10 MG tablet    FLEXERIL          diclofenac 1 % Gel topical gel    VOLTAREN    100 g    Apply 2 g topically 4 times daily to hands    Primary osteoarthritis of both hands       dorzolamide 2 % ophthalmic solution    TRUSOPT    1 Bottle    Place 1 drop into the right eye 2 times daily    Primary open angle glaucoma of both eyes, severe stage       * dorzolamide-timolol 2-0.5 % ophthalmic solution    COSOPT          * dorzolamide-timolol 2-0.5 % ophthalmic solution    COSOPT    1 Bottle    Place 1 drop into the right eye 2 times daily    Primary open angle glaucoma of both eyes, severe stage        EPINEPHrine 0.15 MG/0.3ML injection 2-pack    EPIPEN JR    0.6 mL    Inject 0.3 mLs (0.15 mg) into the muscle as needed for anaphylaxis    Bee sting reaction, undetermined intent, subsequent encounter       escitalopram 10 MG tablet    LEXAPRO     Take 10 mg by mouth daily        ESTRACE VAGINAL 0.1 MG/GM cream   Generic drug:  estradiol     42.5 g    USE DIME SIZE AMOUNT 3X A WEEK AT NIGHT    Atrophic vaginitis       estradiol 1 MG tablet    ESTRACE    90 tablet    Take 1 tablet (1 mg) by mouth daily    Person who has had sex change operation       ferrous sulfate 325 (65 FE) MG tablet    IRON    60 tablet    Take 1 tablet (325 mg) by mouth 2 times daily With meals        fluconazole 100 MG tablet    DIFLUCAN          fluticasone 50 MCG/ACT spray    FLONASE     Spray 1 spray into both nostrils daily        hydrochlorothiazide 12.5 MG capsule    MICROZIDE    90 capsule    Take 1 capsule (12.5 mg) by mouth daily    Essential hypertension with goal blood pressure less than 130/80       HYDROMORPHONE HCL PO      Take 2 mg by mouth every 3 hours as needed for moderate to severe pain        INCRUSE ELLIPTA 62.5 MCG/INH oral inhaler   Generic drug:  umeclidinium      Inhale 1 puff into the lungs daily        lactulose 10 GM/15ML solution    CHRONULAC     Take 20 g by mouth as needed for constipation        latanoprost 0.005 % ophthalmic solution    XALATAN    2.5 mL    Place 1 drop into both eyes At Bedtime    Primary open angle glaucoma of both eyes, severe stage       levETIRAcetam 500 MG tablet    KEPPRA    180 tablet    Take 1 tablet (500 mg) by mouth 2 times daily    Nausea       lidocaine 5 % Patch    LIDODERM    30 patch    APPLY 1 TO 3 PATCHES TO PAINFUL AREA AT ONCE FOR UP TO 12 HOURS WITHIN A 24 HOUR PERIOD REMOVE AFTER 12 HOURS    Chronic pain syndrome, 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 (H)       lisinopril 10 MG tablet    PRINIVIL/ZESTRIL    90 tablet    Take 1 tablet (10 mg) by mouth  daily    Essential hypertension with goal blood pressure less than 130/80       MEDICATION GIVEN BY INTRATHECAL PUMP - INSTRUCTION      continuous 2/8/18: Pump managed by [facility] in [place] [phone].  Conc: Bupivacaine 20 mg/mL and Fentanyl 2000 mcg/mL, morphine 2 mg/mL Continuous:  Fentanyl 796 mcg/day, Bupivacaine 7.96 mg/day, Morphine 0.796 mg/day  Boluses: Up to 7 boluses per 24-hr period of Bupivicaine 0.5994 mg and Fentanyl 59.9 mcg morphine 0.0599 mg. Pump Refill Date 02/28/18        metFORMIN 500 MG 24 hr tablet    GLUCOPHAGE-XR    90 tablet    Take 1 tablet (500 mg) by mouth daily (with dinner)    Type 2 diabetes mellitus with diabetic peripheral angiopathy and gangrene, without long-term current use of insulin (H)       metoprolol succinate 25 MG 24 hr tablet    TOPROL-XL    30 tablet    TAKE 1 TABLET (25 MG) BY MOUTH DAILY    Old myocardial infarction       MULTIVITAMIN PO      Take 1 tablet by mouth daily        nitroGLYcerin 0.4 MG sublingual tablet    NITROSTAT    25 tablet    Place 1 tablet (0.4 mg) under the tongue every 5 minutes as needed for chest pain if you are still having symptoms after 3 doses (15 minutes) call 911.    Chronic systolic congestive heart failure (H), Old myocardial infarction       omeprazole 20 MG CR capsule    priLOSEC          ondansetron 4 MG tablet    ZOFRAN          ondansetron 8 MG ODT tab    ZOFRAN-ODT    30 tablet    Take 1 tablet (8 mg) by mouth every 8 hours as needed    Other migraine without status migrainosus, not intractable       order for DME     1 Month    Equipment being ordered: Depends briefs    Incontinence       order for DME     1 Device    Equipment being ordered: Power Wheelchair    CVA (cerebral infarction), HTN (hypertension)       order for DME     1 Box    Equipment being ordered: Glucerna daily shakes with each meal    Type 2 diabetes mellitus with other diabetic neurological complication       * order for DME      1 Units    Equipment being ordered: Nebulizer and tubing supplies    Simple chronic bronchitis (H)       * order for DME     1 Device    Equipment being ordered: CPAP supplies mask, hose, filters, cushion fax to [facility] at [phone] Disp: 10 DeviceRefills: prn Class: Local PrintStart: 2/10/2017    Chronic obstructive pulmonary disease, unspecified COPD type (H)       * order for DME     10 Device    Equipment being ordered: CPAP supplies mask, hose, filters, cushion  fax to [facility] at [phone]    COPD (chronic obstructive pulmonary disease) (H)       * order for DME     1 Device    Hospital bed with side rails    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 (H)       * order for DME     1 Device    Full electric hospital bed with half rails  Dx: R65539, I110, J449 Length of need: lifetime    Status post bilateral above knee amputation (H)       * order for DME     1 Device    Wheel Chair Cushion: 18 x 18 inch Roho cushion    Status post bilateral above knee amputation (H)       pantoprazole 40 MG EC tablet    PROTONIX    30 tablet    Take 1 tablet (40 mg) by mouth daily    Gastroesophageal reflux disease without esophagitis       Phenytoin Sodium Extended 200 MG Caps     60 capsule    Take 200 mg by mouth 2 times daily    Seizure disorder (H)       polyethylene glycol Packet    MIRALAX/GLYCOLAX     Take 17 g by mouth daily Dissolved in water or juice        pregabalin 75 MG capsule    LYRICA    120 capsule    Take 2 capsules (150 mg) by mouth 2 times daily    Pain in both upper extremities       risperiDONE 0.5 MG tablet    risperDAL     Take 0.5 mg by mouth At Bedtime        sucralfate 1 GM tablet    CARAFATE    120 tablet    Take 1 tablet (1 g) by mouth 4 times daily May dissolve in 10 mL water is necessary. (Start upon completion of carafate suspension)    Adjustment disorder with depressed mood       * traZODone 100 MG tablet    DESYREL          * traZODone 50 MG tablet     DESYREL     Take 150 mg by mouth At Bedtime        vitamin D 2000 UNITS tablet     100 tablet    Take 2,000 Units by mouth daily.        zinc 50 MG Tabs      Take 1 tablet by mouth daily        * Notice:  This list has 12 medication(s) that are the same as other medications prescribed for you. Read the directions carefully, and ask your doctor or other care provider to review them with you.

## 2018-03-29 NOTE — NURSING NOTE
"Chief Complaints and History of Present Illnesses   Patient presents with     Glaucoma Follow Up     POAG/?LTG vs Glc Suspect     HPI    Affected eye(s):  Both   Symptoms:     Floaters (Comment: longstanding/stable)   No flashes   Photophobia      Duration:  5 months   Frequency:  Constant       Do you have eye pain now?:  Yes   Location:  OD, OS   Pain Level:  Extreme Pain (8), Severe Pain (7)   Other:  right eye pain at \"8\", left eye pain at \"7\"      Comments:  Pt here for f/u glaucoma  Pt states she believes her vision may be slightly worse than last visit - uses a magnifier which seems to help some  Pt states she has not met with Manuela Mitchell    Ocular meds:  Dorzolamide 2x/day in the right eye only  Latanoprost at bedtime in both eyes - needs hand written refill today  Brimonidine 2x/day in the right eye only    Daniela DUNLAP 7:34 AM March 29, 2018              "

## 2018-03-29 NOTE — PROGRESS NOTES
1)POAG/?LTG vs Glc Suspect -- s/p SLT OD (3/13/15), H/O negative head CT and MRI 2013,2014 for headaches, ?retinal vascular event in the right eye in the past.-- K pachy: 539/540   Tmax: OD:23    HVF: OD:[place] [facility] in 2016 (unable to transfer out of chair)     CDR: 0.7/0.9 (pallor OS>OD)    HRT/OCT: Severe RNFL thinning OS>OD       FHX of Glc: Father, grandparents -- lost vision, was on gtts     Gonio:       Intolerant to:      Asthma/COPD: Yes, on inhalers but tolerating topical and po BB   Steroid Use: No    Kidney Stones: No    Sulfa Allergy: No     IOP targets:  2)PCIOL OD -- vision loss in the right eye predates 2013 (pt believes she lost all vision in 2005) and she had several MRI's in 2013 including one with orbital sequences which did not reveal any structural change to cause a right optic neuropathy.  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  7)COLLEEN    MD:pt ran out of Latanoprost 3 days ago    Patient will continue on Cosopt (Timolol/Dorzolamide) which is a blue top drop 2x/day (12 hours apart) in the RIGHT EYE ONLY, Latanoprost which is a teal top drop at bedtime in both eyes, and Alphagan (Brimonidine) which is a purple top drop 2x/day (12 hours apart) in the RIGHT EYE ONLY.  Patient will return to clinic in 3-4 months for repeat IOP check.  Patient will also follow up with Manuela Mitchell for low vision evaluation.      Resident note:  [facility] field 03/29/18 compared to 2016:  OD: 50% False negatives [place]. Lower MD and sensitivity compared to prior.    IOP okay today.  Continue present management Copsopt, latanoprost, alphagan right eye     Patient in wheelchair, difficult cooperating with  LVC. Unclear if decrease in md  is real, acuity is about stable, though she reports subjective decline.    Return 3 months for repeat LVC, intraocular pressure check    Kg Phillips MD  PGY3     Attending Physician Attestation:  Complete documentation of historical and exam elements from today's encounter can be found in the full encounter summary report (not reduplicated in this progress note). I personally obtained the chief complaint(s) and history of present illness.  I confirmed and edited as necessary the review of systems, past medical/surgical history, family history, social history, and examination findings as documented by others; and I examined the patient myself. I personally reviewed the relevant tests, images, and reports as documented above. I formulated and edited as necessary the assessment and plan and discussed the findings and management plan with the patient and family.  - Marely Robin MD

## 2018-03-30 ENCOUNTER — TELEPHONE (OUTPATIENT)
Dept: INTERNAL MEDICINE | Facility: CLINIC | Age: 68
End: 2018-03-30

## 2018-03-30 ENCOUNTER — OFFICE VISIT (OUTPATIENT)
Dept: INTERNAL MEDICINE | Facility: CLINIC | Age: 68
End: 2018-03-30
Payer: COMMERCIAL

## 2018-03-30 VITALS
DIASTOLIC BLOOD PRESSURE: 68 MMHG | SYSTOLIC BLOOD PRESSURE: 122 MMHG | HEART RATE: 77 BPM | TEMPERATURE: 98.5 F | OXYGEN SATURATION: 95 %

## 2018-03-30 DIAGNOSIS — Z98.890 HISTORY OF SUPRAPUBIC CATHETER: ICD-10-CM

## 2018-03-30 DIAGNOSIS — N30.00 ACUTE CYSTITIS WITHOUT HEMATURIA: Primary | ICD-10-CM

## 2018-03-30 DIAGNOSIS — R10.2 VAGINAL PAIN: ICD-10-CM

## 2018-03-30 DIAGNOSIS — R10.2 PELVIC PAIN IN FEMALE: Primary | ICD-10-CM

## 2018-03-30 LAB
ALBUMIN UR-MCNC: NEGATIVE MG/DL
APPEARANCE UR: CLEAR
BACTERIA #/AREA URNS HPF: ABNORMAL /HPF
BILIRUB UR QL STRIP: NEGATIVE
COLOR UR AUTO: YELLOW
GLUCOSE UR STRIP-MCNC: NEGATIVE MG/DL
HGB UR QL STRIP: ABNORMAL
KETONES UR STRIP-MCNC: NEGATIVE MG/DL
LEUKOCYTE ESTERASE UR QL STRIP: ABNORMAL
NITRATE UR QL: NEGATIVE
PH UR STRIP: 7 PH (ref 5–7)
RBC #/AREA URNS AUTO: ABNORMAL /HPF
SOURCE: ABNORMAL
SP GR UR STRIP: 1.01 (ref 1–1.03)
UROBILINOGEN UR STRIP-ACNC: 0.2 EU/DL (ref 0.2–1)
WBC #/AREA URNS AUTO: ABNORMAL /HPF

## 2018-03-30 PROCEDURE — 81001 URINALYSIS AUTO W/SCOPE: CPT | Performed by: PHYSICIAN ASSISTANT

## 2018-03-30 PROCEDURE — 87086 URINE CULTURE/COLONY COUNT: CPT | Performed by: PHYSICIAN ASSISTANT

## 2018-03-30 PROCEDURE — 87088 URINE BACTERIA CULTURE: CPT | Performed by: PHYSICIAN ASSISTANT

## 2018-03-30 PROCEDURE — 87186 SC STD MICRODIL/AGAR DIL: CPT | Performed by: PHYSICIAN ASSISTANT

## 2018-03-30 PROCEDURE — 99214 OFFICE O/P EST MOD 30 MIN: CPT | Performed by: PHYSICIAN ASSISTANT

## 2018-03-30 RX ORDER — CLOTRIMAZOLE 1 %
CREAM (GRAM) TOPICAL 2 TIMES DAILY
Qty: 30 G | Refills: 1 | Status: SHIPPED | OUTPATIENT
Start: 2018-03-30 | End: 2018-04-13

## 2018-03-30 ASSESSMENT — PAIN SCALES - GENERAL: PAINLEVEL: EXTREME PAIN (8)

## 2018-03-30 NOTE — TELEPHONE ENCOUNTER
Reason for call:  Other   Patient called regarding (reason for call): patient returned a call, but could not get a nurse on the line, possibly lab results from her visit today with emily  Additional comments: Please call patient back    Phone number to reach patient:  Home number on file [phone] (home)    Best Time:  asap    Can we leave a detailed message on this number?  YES

## 2018-03-30 NOTE — MR AVS SNAPSHOT
After Visit Summary   3/30/2018    Sonya Foote    MRN: [id number]           Patient Information     Date Of Birth          1950        Visit Information        Provider Department      3/30/2018 11:40 AM Vida Sanchez PA-C [facility]        Today's Diagnoses     Pelvic pain in female    -  1    Vaginal pain        History of suprapubic catheter           Follow-ups after your visit        Your next 10 appointments already scheduled     Apr 02, 2018 10:00 AM CDT   US CAROTID BILATERAL with UCUSV1   [facility] US ([facility])    [address]  1st Floor  [place] [facility] [place] [id number] [phone]           Please bring a list of your medicines (including vitamins, minerals and over-the-counter drugs). Also, tell your doctor about any allergies you may have. Wear comfortable clothes and leave your valuables at home.  You do not need to do anything special to prepare for your exam.  Please call the Imaging Department at your exam site with any questions.            Apr 19, 2018 11:00 AM CDT   L/Vision Eval with Manuela Mitchell OT   [facility] Vision - Outpatie ([facility])    [address]  9th Floor, Clinic 9a  [facility] [place] [address] [phone]            May 04, 2018 10:20 AM CDT   (Arrive by 10:05 AM)   RETURN DIABETES with Michelle Irizarry MD   [facility] ([facility])    [address]  3rd Floor  [facility] [facility] [place] [id number] [phone]            Jun 04, 2018  9:30 AM CDT   (Arrive by 9:15 AM)   Return Visit with Alina Jennings MD   [facility] ([facility])    [address]  [address]  [place] [id number] [phone]            Jun 26, 2018 10:45 AM CDT   Return Visit with Bj Anderson MD  "  [facility] ([facility])    [address]  [place] [address]   [phone] OPT 2            2018 10:15 AM CDT   RETURN GLAUCOMA with Marely Robin MD   [facility] ([facility])    Kwan Zarate [address]  9 Fl Clin 9a  [facility] [address] [phone]              Who to contact     If you have questions or need follow up information about today's clinic visit or your schedule please contact [facility] directly at [phone].  Normal or non-critical lab and imaging results will be communicated to you by MyChart, letter or phone within 4 business days after the clinic has received the results. If you do not hear from us within 7 days, please contact the clinic through MyChart or phone. If you have a critical or abnormal lab result, we will notify you by phone as soon as possible.  Submit refill requests through [facility] or call your pharmacy and they will forward the refill request to us. Please allow 3 business days for your refill to be completed.          Additional Information About Your Visit        MyChart Information     [facility] lets you send messages to your doctor, view your test results, renew your prescriptions, schedule appointments and more. To sign up, go to [email or web address] . Click on \"Log in\" on the left side of the screen, which will take you to the Welcome page. Then click on \"Sign up Now\" on the right side of the page.     You will be asked to enter the access code listed below, as well as some personal information. Please follow the directions to create your username and password.     Your access code is: [email or web address]  Expires: 2018 10:57 AM     Your access code will  in 90 days. If you need help or a new code, please call your [facility] or [phone].        Care EveryWhere ID     This is your Care EveryWhere ID. This could " be used by other organizations to access your [facility] medical records  [id number]        Your Vitals Were     Pulse Temperature Pulse Oximetry             77 98.5  F (36.9  C) (Oral) 95%          Blood Pressure from Last 3 Encounters:   03/30/18 122/68   03/20/18 112/68   03/01/18 93/60    Weight from Last 3 Encounters:   03/20/18 123 lb (55.8 kg)   03/01/18 123 lb (55.8 kg)   02/13/18 123 lb (55.8 kg)              We Performed the Following     UA reflex to Microscopic and Culture     Urine Culture Aerobic Bacterial     Urine Microscopic          Today's Medication Changes          These changes are accurate as of 3/30/18 12:31 PM.  If you have any questions, ask your nurse or doctor.               Start taking these medicines.        Dose/Directions    clotrimazole 1 % cream   Commonly known as:  LOTRIMIN   Used for:  Vaginal pain   Started by:  Vida Sanchez PA-C        Apply topically 2 times daily for 14 days   Quantity:  30 g   Refills:  1         These medicines have changed or have updated prescriptions.        Dose/Directions    atorvastatin 40 MG tablet   Commonly known as:  LIPITOR   This may have changed:  when to take this   Used for:  Hyperlipidemia LDL goal <100        Dose:  40 mg   Take 1 tablet (40 mg) by mouth daily   Quantity:  90 tablet   Refills:  3       hydrochlorothiazide 12.5 MG capsule   Commonly known as:  MICROZIDE   This may have changed:  additional instructions   Used for:  Essential hypertension with goal blood pressure less than 130/80        Dose:  12.5 mg   Take 1 capsule (12.5 mg) by mouth daily   Quantity:  90 capsule   Refills:  3       Phenytoin Sodium Extended 200 MG Caps   This may have changed:  additional instructions   Used for:  Seizure disorder (H)        Dose:  200 mg   Take 200 mg by mouth 2 times daily   Quantity:  60 capsule   Refills:  0            Where to get your medicines      Some of these will need a paper prescription and others can be bought  over the counter.  Ask your nurse if you have questions.     Bring a paper prescription for each of these medications     clotrimazole 1 % cream                Primary Care Provider Office Phone # Fax #    Allan Casey [phone]       [address] [facility] [address]        Equal Access to Services     KARI CARREON : Hadii aad ku hadasho Soomaali, waaxda luqadaha, qaybta kaalmada adeegyada, waxay idiin hayaan adeeg khlailash labethany . So [facility] [phone].    ATENCIÓN: Si habla español, tiene a briones disposición servicios gratuitos de asistencia lingüística. Llame al [phone].    We comply with applicable federal civil rights laws and [place] laws. We do not discriminate on the basis of race, color, national origin, age, disability, sex, sexual orientation, or gender identity.            Thank you!     Thank you for choosing [facility]  for your care. Our goal is always to provide you with excellent care. Hearing back from our patients is one way we can continue to improve our services. Please take a few minutes to complete the written survey that you may receive in the mail after your visit with us. Thank you!             Your Updated Medication List - Protect others around you: Learn how to safely use, store and throw away your medicines at www.disposemymeds.org.          This list is accurate as of 3/30/18 12:31 PM.  Always use your most recent med list.                   Brand Name Dispense Instructions for use Diagnosis    ACETAMINOPHEN PO      Take 1,000 mg by mouth every 4 hours as needed for fever Not to exceed 4000 mg/day        ADVAIR DISKUS 250-50 MCG/DOSE diskus inhaler   Generic drug:  fluticasone-salmeterol     60 Inhaler    Inhale 1 puff into the lungs 2 times daily    Acute bronchitis       * albuterol (2.5 MG/3ML) 0.083% neb solution     360 mL    INHALE 1 VIAL VIA NEBULIZER EVERY 6 HOURS AS NEEDED    Chronic obstructive pulmonary disease,  unspecified COPD type (H)       * VENTOLIN  (90 BASE) MCG/ACT Inhaler   Generic drug:  albuterol     3 Inhaler    Inhale 2 puffs into the lungs every 6 hours as needed for shortness of breath / dyspnea or wheezing    Chronic obstructive pulmonary disease, unspecified COPD type (H)       aspirin 81 MG EC tablet     90 tablet    Take 1 tablet (81 mg) by mouth daily    Unstable angina (H)       atorvastatin 40 MG tablet    LIPITOR    90 tablet    Take 1 tablet (40 mg) by mouth daily    Hyperlipidemia LDL goal <100       blood glucose monitoring lancets     100 each    Use to test blood sugar 3 times daily or as directed.    Type 2 diabetes mellitus with diabetic peripheral angiopathy without gangrene, without long-term current use of insulin (H)       blood glucose monitoring meter device kit     1 kit    Use to test blood sugars 3 times daily or as directed.    Type 2 diabetes, HbA1C goal < 8% (H)       blood glucose monitoring test strip    ONETOUCH ULTRA    3 Box    Use to test blood sugars 3 times daily or as directed.    Type 2 diabetes mellitus with diabetic peripheral angiopathy without gangrene, without long-term current use of insulin (H)       brimonidine 0.2 % ophthalmic solution    ALPHAGAN    1 Bottle    Place 1 drop into the right eye 2 times daily    Primary open angle glaucoma of both eyes, severe stage       calcium citrate-vitamin D 315-250 MG-UNIT Tabs per tablet    CITRACAL    120 tablet    Take 2 tablets by mouth daily    Osteoporosis       cetirizine 10 MG tablet    zyrTEC    90 tablet    Take 1 tablet (10 mg) by mouth daily as needed for allergies    Seasonal allergic rhinitis, unspecified allergic rhinitis trigger       clopidogrel 75 MG tablet    PLAVIX          clotrimazole 1 % cream    LOTRIMIN    30 g    Apply topically 2 times daily for 14 days    Vaginal pain       CYANOCOBALAMIN PO      Take 2,000 mcg by mouth daily        cyclobenzaprine 10 MG tablet    FLEXERIL           diclofenac 1 % Gel topical gel    VOLTAREN    100 g    Apply 2 g topically 4 times daily to hands    Primary osteoarthritis of both hands       dorzolamide 2 % ophthalmic solution    TRUSOPT    1 Bottle    Place 1 drop into the right eye 2 times daily    Primary open angle glaucoma of both eyes, severe stage       dorzolamide-timolol 2-0.5 % ophthalmic solution    COSOPT    1 Bottle    Place 1 drop into the right eye 2 times daily    Primary open angle glaucoma of both eyes, severe stage       EPINEPHrine 0.15 MG/0.3ML injection 2-pack    EPIPEN JR    0.6 mL    Inject 0.3 mLs (0.15 mg) into the muscle as needed for anaphylaxis    Bee sting reaction, undetermined intent, subsequent encounter       escitalopram 10 MG tablet    LEXAPRO     Take 10 mg by mouth daily        ESTRACE VAGINAL 0.1 MG/GM cream   Generic drug:  estradiol     42.5 g    USE DIME SIZE AMOUNT 3X A WEEK AT NIGHT    Atrophic vaginitis       estradiol 1 MG tablet    ESTRACE    90 tablet    Take 1 tablet (1 mg) by mouth daily    Person who has had sex change operation       ferrous sulfate 325 (65 FE) MG tablet    IRON    60 tablet    Take 1 tablet (325 mg) by mouth 2 times daily With meals        fluticasone 50 MCG/ACT spray    FLONASE     Spray 1 spray into both nostrils daily        hydrochlorothiazide 12.5 MG capsule    MICROZIDE    90 capsule    Take 1 capsule (12.5 mg) by mouth daily    Essential hypertension with goal blood pressure less than 130/80       HYDROMORPHONE HCL PO      Take 2 mg by mouth every 3 hours as needed for moderate to severe pain        INCRUSE ELLIPTA 62.5 MCG/INH oral inhaler   Generic drug:  umeclidinium      Inhale 1 puff into the lungs daily        lactulose 10 GM/15ML solution    CHRONULAC     Take 20 g by mouth as needed for constipation        latanoprost 0.005 % ophthalmic solution    XALATAN    2.5 mL    Place 1 drop into both eyes At Bedtime    Primary open angle glaucoma of both eyes, severe stage        levETIRAcetam 500 MG tablet    KEPPRA    180 tablet    Take 1 tablet (500 mg) by mouth 2 times daily    Nausea       lidocaine 5 % Patch    LIDODERM    30 patch    APPLY 1 TO 3 PATCHES TO PAINFUL AREA AT ONCE FOR UP TO 12 HOURS WITHIN A 24 HOUR PERIOD REMOVE AFTER 12 HOURS    Chronic pain syndrome, 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 (H)       lisinopril 10 MG tablet    PRINIVIL/ZESTRIL    90 tablet    Take 1 tablet (10 mg) by mouth daily    Essential hypertension with goal blood pressure less than 130/80       MEDICATION GIVEN BY INTRATHECAL PUMP - INSTRUCTION      continuous 2/8/18: Pump managed by [facility] in [place] [phone].  Conc: Bupivacaine 20 mg/mL and Fentanyl 2000 mcg/mL, morphine 2 mg/mL Continuous:  Fentanyl 796 mcg/day, Bupivacaine 7.96 mg/day, Morphine 0.796 mg/day  Boluses: Up to 7 boluses per 24-hr period of Bupivicaine 0.5994 mg and Fentanyl 59.9 mcg morphine 0.0599 mg. Pump Refill Date 02/28/18        metFORMIN 500 MG 24 hr tablet    GLUCOPHAGE-XR    90 tablet    Take 1 tablet (500 mg) by mouth daily (with dinner)    Type 2 diabetes mellitus with diabetic peripheral angiopathy and gangrene, without long-term current use of insulin (H)       metoprolol succinate 25 MG 24 hr tablet    TOPROL-XL    30 tablet    TAKE 1 TABLET (25 MG) BY MOUTH DAILY    Old myocardial infarction       MULTIVITAMIN PO      Take 1 tablet by mouth daily        nitroGLYcerin 0.4 MG sublingual tablet    NITROSTAT    25 tablet    Place 1 tablet (0.4 mg) under the tongue every 5 minutes as needed for chest pain if you are still having symptoms after 3 doses (15 minutes) call 911.    Chronic systolic congestive heart failure (H), Old myocardial infarction       omeprazole 20 MG CR capsule    priLOSEC          ondansetron 8 MG ODT tab    ZOFRAN-ODT    30 tablet    Take 1 tablet (8 mg) by mouth every 8 hours as needed    Other migraine without status migrainosus, not  intractable       order for DME     1 Month    Equipment being ordered: Depends briefs    Incontinence       order for DME     1 Device    Equipment being ordered: Power Wheelchair    CVA (cerebral infarction), HTN (hypertension)       order for DME     1 Box    Equipment being ordered: Glucerna daily shakes with each meal    Type 2 diabetes mellitus with other diabetic neurological complication       * order for DME     1 Units    Equipment being ordered: Nebulizer and tubing supplies    Simple chronic bronchitis (H)       * order for DME     1 Device    Equipment being ordered: CPAP supplies mask, hose, filters, cushion fax to [facility] at [phone] Disp: 10 DeviceRefills: prn Class: Local PrintStart: 2/10/2017    Chronic obstructive pulmonary disease, unspecified COPD type (H)       * order for DME     10 Device    Equipment being ordered: CPAP supplies mask, hose, filters, cushion  fax to [facility] at [phone]    COPD (chronic obstructive pulmonary disease) (H)       * order for DME     1 Device    Hospital bed with side rails    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 (H)       * order for DME     1 Device    Full electric hospital bed with half rails  Dx: Q30871, I110, J449 Length of need: lifetime    Status post bilateral above knee amputation (H)       * order for DME     1 Device    Wheel Chair Cushion: 18 x 18 inch Roho cushion    Status post bilateral above knee amputation (H)       pantoprazole 40 MG EC tablet    PROTONIX    30 tablet    Take 1 tablet (40 mg) by mouth daily    Gastroesophageal reflux disease without esophagitis       Phenytoin Sodium Extended 200 MG Caps     60 capsule    Take 200 mg by mouth 2 times daily    Seizure disorder (H)       polyethylene glycol Packet    MIRALAX/GLYCOLAX     Take 17 g by mouth daily Dissolved in water or juice        pregabalin 75 MG capsule    LYRICA    120 capsule    Take 2 capsules (150 mg) by mouth 2 times daily    Pain  in both upper extremities       risperiDONE 0.5 MG tablet    risperDAL     Take 0.5 mg by mouth At Bedtime        sucralfate 1 GM tablet    CARAFATE    120 tablet    Take 1 tablet (1 g) by mouth 4 times daily May dissolve in 10 mL water is necessary. (Start upon completion of carafate suspension)    Adjustment disorder with depressed mood       * traZODone 100 MG tablet    DESYREL          * traZODone 50 MG tablet    DESYREL     Take 150 mg by mouth At Bedtime        vitamin D 2000 UNITS tablet     100 tablet    Take 2,000 Units by mouth daily.        zinc 50 MG Tabs      Take 1 tablet by mouth daily        * Notice:  This list has 10 medication(s) that are the same as other medications prescribed for you. Read the directions carefully, and ask your doctor or other care provider to review them with you.

## 2018-03-30 NOTE — PROGRESS NOTES
SUBJECTIVE:   Sonya Foote is a 68 year old female who presents to clinic today for the following health issues:      URINARY TRACT SYMPTOMS  Onset: 4-5 days, very painful last night    Description:   Painful urination (Dysuria): YES. Burning sensation even with the cath bag  Blood in urine (Hematuria): no   Delay in urine (Hesitency): no   Patient states: Dark colored -self urine  Patient had Suprapubic cath tubing changed by Home health earlier this week.       Intensity: severe    Progression of Symptoms:  worsening    Accompanying Signs & Symptoms:  Fever/chills: YES feels cold often  Flank pain no   Nausea and vomiting: No  Any vaginal symptoms: itching  Abdominal/Pelvic Pain: no     History:   History of frequent UTI's: YES  History of kidney stones: no   Sexually Active: no   Possibility of pregnancy: No    Precipitating factors:   None    Therapies Tried and outcome:Cranberry juice prn, not helpful    Patient has suprapubic cath, Cath was changed 3/1/2018 per EMR  Urine has been yellow in color no blood noted.  Sonya complaining of pain around the suprapubic cath and vaginal pain.   She is a transitioned transgender.       -------------------------------------    Problem list and histories reviewed & adjusted, as indicated.  Additional history: as documented    Labs reviewed in EPIC    Reviewed and updated as needed this visit by clinical staff       Reviewed and updated as needed this visit by Provider  Allergies  Meds         ROS:  Constitutional, HEENT, cardiovascular, pulmonary, gi and gu systems are negative, except as otherwise noted.    OBJECTIVE:     /68 (BP Location: Left arm, Patient Position: Chair, Cuff Size: Adult Large)  Pulse 77  Temp 98.5  F (36.9  C) (Oral)  SpO2 95%  There is no height or weight on file to calculate BMI.  GENERAL: healthy, alert and no distress  RESP: lungs clear to auscultation - no rales, rhonchi or wheezes  CV: regular rates and rhythm and normal S1 S2, no S3  or S4  ABDOMEN: soft, nontender, without hepatosplenomegaly or masses, tenderness suprapubic and bowel sounds normal  SKIN: no suspicious lesions or rashes  , slight irritation noted.     Diagnostic Test Results:  Results for orders placed or performed in visit on 03/30/18 (from the past 24 hour(s))   UA reflex to Microscopic and Culture   Result Value Ref Range    Color Urine Yellow     Appearance Urine Clear     Glucose Urine Negative NEG^Negative mg/dL    Bilirubin Urine Negative NEG^Negative    Ketones Urine Negative NEG^Negative mg/dL    Specific Gravity Urine 1.010 1.003 - 1.035    Blood Urine Small (A) NEG^Negative    pH Urine 7.0 5.0 - 7.0 pH    Protein Albumin Urine Negative NEG^Negative mg/dL    Urobilinogen Urine 0.2 0.2 - 1.0 EU/dL    Nitrite Urine Negative NEG^Negative    Leukocyte Esterase Urine Trace (A) NEG^Negative    Source Midstream Urine    Urine Microscopic   Result Value Ref Range    WBC Urine 0 - 5 OTO5^0 - 5 /HPF    RBC Urine 2-5 (A) OTO2^O - 2 /HPF    Bacteria Urine Few (A) NEG^Negative /HPF     *Note: Due to a large number of results and/or encounters for the requested time period, some results have not been displayed. A complete set of results can be found in Results Review.       ASSESSMENT/PLAN:             1. Pelvic pain in female    - UA reflex to Microscopic and Culture  - Urine Microscopic  - Urine Culture Aerobic Bacterial    2. Vaginal pain    - clotrimazole (LOTRIMIN) 1 % cream; Apply topically 2 times daily for 14 days  Dispense: 30 g; Refill: 1    3. History of suprapubic catheter        Will culture urine, to make sure no infection given recent tubing changes.   For now fluids and monitor, if worsening over the weekend recheck as needed.  Patient to contact urology for follow up as well if pain continues and Urine culture negative     25 minutes spent with patient > 50% of time on counseling and plan of care.      Vida Sanchez PA-C  [facility]  [facility]

## 2018-04-01 LAB
BACTERIA SPEC CULT: ABNORMAL
SPECIMEN SOURCE: ABNORMAL

## 2018-04-02 ENCOUNTER — RADIANT APPOINTMENT (OUTPATIENT)
Dept: ULTRASOUND IMAGING | Facility: CLINIC | Age: 68
End: 2018-04-02
Attending: PSYCHIATRY & NEUROLOGY
Payer: COMMERCIAL

## 2018-04-02 DIAGNOSIS — G40.919 INTRACTABLE EPILEPSY WITHOUT STATUS EPILEPTICUS (H): ICD-10-CM

## 2018-04-02 DIAGNOSIS — I69.90 LATE EFFECTS OF CEREBROVASCULAR DISEASE: ICD-10-CM

## 2018-04-02 RX ORDER — TETRACYCLINE HYDROCHLORIDE 500 MG/1
500 CAPSULE ORAL 2 TIMES DAILY
Qty: 14 CAPSULE | Refills: 0 | Status: SHIPPED | OUTPATIENT
Start: 2018-04-02 | End: 2018-04-09

## 2018-04-02 NOTE — TELEPHONE ENCOUNTER
Urine culture is positive for an infection.  Prescription for antibiotics sent to [facility] pharmacy for patient.

## 2018-04-02 NOTE — TELEPHONE ENCOUNTER
Vida,     I have spoken with Sonya and gave her the information below. She is aware she has a prescription coming.     The medication has not been approved yet and the pharmacy has been changed.    Please approve the medication and close the chart when finished.   Thank you.     Vida.ALEKSEY Samuels ([facility])

## 2018-04-04 ENCOUNTER — TELEPHONE (OUTPATIENT)
Dept: INTERNAL MEDICINE | Facility: CLINIC | Age: 68
End: 2018-04-04

## 2018-04-04 NOTE — TELEPHONE ENCOUNTER
KRISTY not available.  Writer called prescription into [facility].  Informed patient.

## 2018-04-04 NOTE — TELEPHONE ENCOUNTER
[facility] states doxycyclinehycate caps is covered by insurance; pt called and is concerned that she has not received any medication for her uti; please advise

## 2018-04-04 NOTE — TELEPHONE ENCOUNTER
[facility] on 4/2/18.  Patient has not received medication yet.  Writer called [facility] pharmacy.  Tetracycline is not covered under patient's plan.  Doxycycline hyclate tablets or minocycline is covered.  Please send alternative prescription to pharmacy.

## 2018-04-04 NOTE — TELEPHONE ENCOUNTER
I did not prescribe this medication as it appears it was prescribed by Vida.  I would assume she is treating the urine culture that was present  but would suggest that we forward this to her to see if she wants to change based on her recent clinical exam.  If she is not available then we will have to potentially substitute an equivalent dosed medication based on what her insurance is covering. Doxycycline 100mg po BID #14, may call in

## 2018-04-05 ENCOUNTER — TRANSFERRED RECORDS (OUTPATIENT)
Dept: HEALTH INFORMATION MANAGEMENT | Facility: CLINIC | Age: 68
End: 2018-04-05

## 2018-04-12 NOTE — PROGRESS NOTES
Sonya Foote 1950 limb/stump socks 3/20/2018 Jamie Delivered 4/6/18     Per APA item delivered, CM notified    Irene Gary  Case Management Specialist   [facility]   [phone]

## 2018-04-17 ENCOUNTER — PATIENT OUTREACH (OUTPATIENT)
Dept: GERIATRIC MEDICINE | Facility: CLINIC | Age: 68
End: 2018-04-17

## 2018-04-17 NOTE — PROGRESS NOTES
CM received call from member that she would like to look into getting new dentures.      CM and member made a conference call to [facility]  - spoke with Sherrell.  Member prefers to go to [facility] or [facility] - dental office at [facility]. Sherrell made conference call to schedule appt.     [facility]  - no openings for new clients yet at this time.   June schedule not out yet at this time but can call back in May to see if any June openings.     [facility] - scheduled appt for 6/11 @ 9:45 a.m.     Member states she wishes she could get sooner appt but at this time she wishes to stay with [facility] - if she decides she wants sooner she will notify ELVIRA and will call [facility].     Jamie Sharma RN, PHN  [facility]

## 2018-04-18 ENCOUNTER — OFFICE VISIT (OUTPATIENT)
Dept: INTERNAL MEDICINE | Facility: CLINIC | Age: 68
End: 2018-04-18
Payer: COMMERCIAL

## 2018-04-18 VITALS
OXYGEN SATURATION: 92 % | BODY MASS INDEX: 19.26 KG/M2 | WEIGHT: 123 LBS | SYSTOLIC BLOOD PRESSURE: 112 MMHG | TEMPERATURE: 97.9 F | HEART RATE: 81 BPM | DIASTOLIC BLOOD PRESSURE: 68 MMHG | RESPIRATION RATE: 16 BRPM

## 2018-04-18 DIAGNOSIS — T30.0 BURN OF SKIN: ICD-10-CM

## 2018-04-18 DIAGNOSIS — G40.909 SEIZURE DISORDER (H): ICD-10-CM

## 2018-04-18 DIAGNOSIS — T63.444D BEE STING REACTION, UNDETERMINED INTENT, SUBSEQUENT ENCOUNTER: ICD-10-CM

## 2018-04-18 DIAGNOSIS — E11.9 DIABETES MELLITUS TYPE 2 WITHOUT RETINOPATHY (H): ICD-10-CM

## 2018-04-18 DIAGNOSIS — J06.9 UPPER RESPIRATORY TRACT INFECTION, UNSPECIFIED TYPE: Primary | ICD-10-CM

## 2018-04-18 LAB — PHENYTOIN SERPL-MCNC: 21.2 MG/L (ref 10–20)

## 2018-04-18 PROCEDURE — 99214 OFFICE O/P EST MOD 30 MIN: CPT | Performed by: INTERNAL MEDICINE

## 2018-04-18 PROCEDURE — 80185 ASSAY OF PHENYTOIN TOTAL: CPT | Performed by: INTERNAL MEDICINE

## 2018-04-18 PROCEDURE — 36415 COLL VENOUS BLD VENIPUNCTURE: CPT | Performed by: INTERNAL MEDICINE

## 2018-04-18 RX ORDER — SILVER SULFADIAZINE 10 MG/G
CREAM TOPICAL 2 TIMES DAILY
Qty: 85 G | Refills: 0 | Status: SHIPPED | OUTPATIENT
Start: 2018-04-18 | End: 2018-04-25

## 2018-04-18 RX ORDER — EPINEPHRINE 0.3 MG/.3ML
0.3 INJECTION SUBCUTANEOUS PRN
Qty: 0.6 ML | Refills: 1 | Status: SHIPPED | OUTPATIENT
Start: 2018-04-18 | End: 2020-01-28

## 2018-04-18 RX ORDER — DOXYCYCLINE HYCLATE 100 MG
100 TABLET ORAL 2 TIMES DAILY
Qty: 14 TABLET | Refills: 0 | Status: ON HOLD | OUTPATIENT
Start: 2018-04-18 | End: 2018-05-02

## 2018-04-18 RX ORDER — CODEINE PHOSPHATE AND GUAIFENESIN 10; 100 MG/5ML; MG/5ML
1 SOLUTION ORAL EVERY 4 HOURS PRN
Qty: 120 ML | Refills: 0 | Status: SHIPPED | OUTPATIENT
Start: 2018-04-18 | End: 2018-05-09

## 2018-04-18 ASSESSMENT — PAIN SCALES - GENERAL: PAINLEVEL: EXTREME PAIN (8)
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
## 2018-04-18 NOTE — MR AVS SNAPSHOT
After Visit Summary   4/18/2018    Sonya Foote    MRN: [id number]           Patient Information     Date Of Birth          1950        Visit Information        Provider Department      4/18/2018 10:20 AM Allan Casey MD [facility]        Today's Diagnoses     Upper respiratory tract infection, unspecified type    -  1    Diabetes mellitus type 2 without retinopathy (H)        Burn of skin        Bee sting reaction, undetermined intent, subsequent encounter           Follow-ups after your visit        Follow-up notes from your care team     Return if symptoms worsen or fail to improve.      Your next 10 appointments already scheduled     Apr 19, 2018 11:00 AM CDT   L/Vision Eval with Manuela Mitchell, OT   [facility] [place], Occupational Therapy, Vision - Outpatie ([facility] [place])    [address]  9th Floor, Clinic 9a  [facility] [address]   [phone]            May 04, 2018 10:20 AM CDT   (Arrive by 10:05 AM)   RETURN DIABETES with Michelle Irizarry MD   [facility] Endocrinology ([facility])    [address]  3rd Floor  [facility] [address]   [phone]            May 10, 2018  7:40 AM CDT   (Arrive by 7:25 AM)   Return Visit with Keanu Dawson MD   [facility] Urology and [facility] Pro[facility] Urologic C[facility] ([facility])    [address] [place] [address]  4th Floor  [facility] [address]   [phone]            Jun 04, 2018  9:30 AM CDT   (Arrive by 9:15 AM)   Return Visit with Alina Jennings MD   [facility] ([facility])    [address] [place] [address]  [address]  [facility] [address]   [phone]            Jun 26, 2018 10:45 AM CDT   Return Visit with Bj Anderson MD   [facility] Heart [facility] ([facility])    [address] [place] [address]  "[facility] [address]  [facility] [address]   [phone] OPT 2            2018 10:15 AM CDT   RETURN GLAUCOMA with Marely Robin MD   Eye Clinic ([facility])    [address]   [address]  [facility] [address]   [phone]              Who to contact     If you have questions or need follow up information about today's clinic visit or your schedule please contact [facility] directly at [phone].  Normal or non-critical lab and imaging results will be communicated to you by Heekyahart, letter or phone within 4 business days after the clinic has received the results. If you do not hear from us within 7 days, please contact the clinic through Heekyahart or phone. If you have a critical or abnormal lab result, we will notify you by phone as soon as possible.  Submit refill requests through [email or web address] or call your pharmacy and they will forward the refill request to us. Please allow 3 business days for your refill to be completed.          Additional Information About Your Visit        MyChart Information     [email or web address] lets you send messages to your doctor, view your test results, renew your prescriptions, schedule appointments and more. To sign up, go to [email or web address] . Click on \"Log in\" on the left side of the screen, which will take you to the Welcome page. Then click on \"Sign up Now\" on the right side of the page.     You will be asked to enter the access code listed below, as well as some personal information. Please follow the directions to create your username and password.     Your access code is: [email or web address]  Expires: 2018 11:08 AM     Your access code will  in 90 days. If you need help or a new code, please call your [facility] or [phone].        Care EveryWhere ID     This is your Care EveryWhere ID. This could be used by other organizations to access your [place] medical records  [id number]      "   Your Vitals Were     Pulse Temperature Respirations Pulse Oximetry BMI (Body Mass Index)       81 97.9  F (36.6  C) (Oral) 16 92% 19.26 kg/m2        Blood Pressure from Last 3 Encounters:   04/18/18 112/68   03/30/18 122/68   03/20/18 112/68    Weight from Last 3 Encounters:   04/18/18 123 lb (55.8 kg)   03/20/18 123 lb (55.8 kg)   03/01/18 123 lb (55.8 kg)              Today, you had the following     No orders found for display         Today's Medication Changes          These changes are accurate as of 4/18/18 11:08 AM.  If you have any questions, ask your nurse or doctor.               Start taking these medicines.        Dose/Directions    doxycycline 100 MG tablet   Commonly known as:  VIBRA-TABS   Used for:  Upper respiratory tract infection, unspecified type   Started by:  Allan Casey MD        Dose:  100 mg   Take 1 tablet (100 mg) by mouth 2 times daily   Quantity:  14 tablet   Refills:  0       guaiFENesin-codeine 100-10 MG/5ML Soln solution   Commonly known as:  ROBITUSSIN AC   Used for:  Upper respiratory tract infection, unspecified type   Started by:  Allan Casey MD        Dose:  1 tsp.   Take 5 mLs by mouth every 4 hours as needed for cough   Quantity:  120 mL   Refills:  0       order for DME   Used for:  Burn of skin   Started by:  Allan Casey MD        Equipment being ordered: bandage tape, prefer paper   Quantity:  1 Package   Refills:  0       silver sulfADIAZINE 1 % cream   Commonly known as:  SILVADENE   Used for:  Burn of skin   Started by:  Allan Casey MD        Apply topically 2 times daily for 7 days   Quantity:  85 g   Refills:  0         These medicines have changed or have updated prescriptions.        Dose/Directions    * EPINEPHrine 0.15 MG/0.3ML injection 2-pack   Commonly known as:  EPIPEN JR   This may have changed:  Another medication with the same name was added. Make sure you understand how and when to take each.   Used for:  Bee sting reaction,  undetermined intent, subsequent encounter   Changed by:  Allan Casey MD        Dose:  0.15 mg   Inject 0.3 mLs (0.15 mg) into the muscle as needed for anaphylaxis   Quantity:  0.6 mL   Refills:  1       * EPINEPHrine 0.3 MG/0.3ML injection 2-pack   Commonly known as:  EPIPEN/ADRENACLICK/or ANY BX GENERIC EQUIV   This may have changed:  You were already taking a medication with the same name, and this prescription was added. Make sure you understand how and when to take each.   Used for:  Bee sting reaction, undetermined intent, subsequent encounter   Changed by:  Allan Casey MD        Dose:  0.3 mg   Inject 0.3 mLs (0.3 mg) into the muscle as needed for anaphylaxis   Quantity:  0.6 mL   Refills:  1       Phenytoin Sodium Extended 200 MG Caps   This may have changed:  additional instructions   Used for:  Seizure disorder (H)        Dose:  200 mg   Take 200 mg by mouth 2 times daily   Quantity:  60 capsule   Refills:  0       * Notice:  This list has 2 medication(s) that are the same as other medications prescribed for you. Read the directions carefully, and ask your doctor or other care provider to review them with you.         Where to get your medicines      These medications were sent to [facility] [address]  [address], [facility] [address]     Phone:  [phone]     doxycycline 100 MG tablet    EPINEPHrine 0.3 MG/0.3ML injection 2-pack    silver sulfADIAZINE 1 % cream         Some of these will need a paper prescription and others can be bought over the counter.  Ask your nurse if you have questions.     Bring a paper prescription for each of these medications     guaiFENesin-codeine 100-10 MG/5ML Soln solution    order for DME                Primary Care Provider Office Phone # Fax #    Allan Casey [phone]       [address] [place], [place] [address]TH [facility] [address]        Equal Access to Services     KARI CARREON AH: John bowers  Divinajoe, wajohnda luqadaha, qaybta kaalmada shashank, tonie marroquin. So [facility] [phone].    ATENCIÓN: Si papito snider, tiene a briones disposición servicios gratuitos de asistencia lingüística. Meño al [phone].    We comply with applicable federal civil rights laws and [place] laws. We do not discriminate on the basis of race, color, national origin, age, disability, sex, sexual orientation, or gender identity.            Thank you!     Thank you for choosing [facility]  for your care. Our goal is always to provide you with excellent care. Hearing back from our patients is one way we can continue to improve our services. Please take a few minutes to complete the written survey that you may receive in the mail after your visit with us. Thank you!             Your Updated Medication List - Protect others around you: Learn how to safely use, store and throw away your medicines at www.disposemymeds.org.          This list is accurate as of 4/18/18 11:08 AM.  Always use your most recent med list.                   Brand Name Dispense Instructions for use Diagnosis    ACETAMINOPHEN PO      Take 1,000 mg by mouth every 4 hours as needed for fever Not to exceed 4000 mg/day        ADVAIR DISKUS 250-50 MCG/DOSE diskus inhaler   Generic drug:  fluticasone-salmeterol     60 Inhaler    Inhale 1 puff into the lungs 2 times daily    Acute bronchitis       * albuterol (2.5 MG/3ML) 0.083% neb solution     360 mL    INHALE 1 VIAL VIA NEBULIZER EVERY 6 HOURS AS NEEDED    Chronic obstructive pulmonary disease, unspecified COPD type (H)       * VENTOLIN  (90 Base) MCG/ACT Inhaler   Generic drug:  albuterol     3 Inhaler    Inhale 2 puffs into the lungs every 6 hours as needed for shortness of breath / dyspnea or wheezing    Chronic obstructive pulmonary disease, unspecified COPD type (H)       aspirin 81 MG EC tablet     90 tablet    Take 1 tablet (81 mg) by mouth daily     Unstable angina (H)       atorvastatin 40 MG tablet    LIPITOR    90 tablet    Take 1 tablet (40 mg) by mouth daily    Hyperlipidemia LDL goal <100       blood glucose monitoring lancets     100 each    Use to test blood sugar 3 times daily or as directed.    Type 2 diabetes mellitus with diabetic peripheral angiopathy without gangrene, without long-term current use of insulin (H)       blood glucose monitoring meter device kit     1 kit    Use to test blood sugars 3 times daily or as directed.    Type 2 diabetes, HbA1C goal < 8% (H)       blood glucose monitoring test strip    ONETOUCH ULTRA    3 Box    Use to test blood sugars 3 times daily or as directed.    Type 2 diabetes mellitus with diabetic peripheral angiopathy without gangrene, without long-term current use of insulin (H)       brimonidine 0.2 % ophthalmic solution    ALPHAGAN    1 Bottle    Place 1 drop into the right eye 2 times daily    Primary open angle glaucoma of both eyes, severe stage       calcium citrate-vitamin D 315-250 MG-UNIT Tabs per tablet    CITRACAL    120 tablet    Take 2 tablets by mouth daily    Osteoporosis       cetirizine 10 MG tablet    zyrTEC    90 tablet    Take 1 tablet (10 mg) by mouth daily as needed for allergies    Seasonal allergic rhinitis, unspecified allergic rhinitis trigger       clopidogrel 75 MG tablet    PLAVIX          CYANOCOBALAMIN PO      Take 2,000 mcg by mouth daily        cyclobenzaprine 10 MG tablet    FLEXERIL          diclofenac 1 % Gel topical gel    VOLTAREN    100 g    Apply 2 g topically 4 times daily to hands    Primary osteoarthritis of both hands       dorzolamide 2 % ophthalmic solution    TRUSOPT    1 Bottle    Place 1 drop into the right eye 2 times daily    Primary open angle glaucoma of both eyes, severe stage       dorzolamide-timolol 2-0.5 % ophthalmic solution    COSOPT    1 Bottle    Place 1 drop into the right eye 2 times daily    Primary open angle glaucoma of both eyes, severe stage        doxycycline 100 MG tablet    VIBRA-TABS    14 tablet    Take 1 tablet (100 mg) by mouth 2 times daily    Upper respiratory tract infection, unspecified type       * EPINEPHrine 0.15 MG/0.3ML injection 2-pack    EPIPEN JR    0.6 mL    Inject 0.3 mLs (0.15 mg) into the muscle as needed for anaphylaxis    Bee sting reaction, undetermined intent, subsequent encounter       * EPINEPHrine 0.3 MG/0.3ML injection 2-pack    EPIPEN/ADRENACLICK/or ANY BX GENERIC EQUIV    0.6 mL    Inject 0.3 mLs (0.3 mg) into the muscle as needed for anaphylaxis    Bee sting reaction, undetermined intent, subsequent encounter       escitalopram 10 MG tablet    LEXAPRO     Take 10 mg by mouth daily        ESTRACE VAGINAL 0.1 MG/GM cream   Generic drug:  estradiol     42.5 g    USE DIME SIZE AMOUNT 3X A WEEK AT NIGHT    Atrophic vaginitis       estradiol 1 MG tablet    ESTRACE    90 tablet    Take 1 tablet (1 mg) by mouth daily    Person who has had sex change operation       ferrous sulfate 325 (65 Fe) MG tablet    IRON    60 tablet    Take 1 tablet (325 mg) by mouth 2 times daily With meals        fluticasone 50 MCG/ACT spray    FLONASE     Spray 1 spray into both nostrils daily        guaiFENesin-codeine 100-10 MG/5ML Soln solution    ROBITUSSIN AC    120 mL    Take 5 mLs by mouth every 4 hours as needed for cough    Upper respiratory tract infection, unspecified type       hydrochlorothiazide 12.5 MG capsule    MICROZIDE    90 capsule    Take 1 capsule (12.5 mg) by mouth daily    Essential hypertension with goal blood pressure less than 130/80       HYDROMORPHONE HCL PO      Take 2 mg by mouth every 3 hours as needed for moderate to severe pain        INCRUSE ELLIPTA 62.5 MCG/INH oral inhaler   Generic drug:  umeclidinium      Inhale 1 puff into the lungs daily        lactulose 10 GM/15ML solution    CHRONULAC     Take 20 g by mouth as needed for constipation        latanoprost 0.005 % ophthalmic solution    XALATAN    2.5 mL    Place  1 drop into both eyes At Bedtime    Primary open angle glaucoma of both eyes, severe stage       levETIRAcetam 500 MG tablet    KEPPRA    180 tablet    Take 1 tablet (500 mg) by mouth 2 times daily    Nausea       lidocaine 5 % Patch    LIDODERM    30 patch    APPLY 1 TO 3 PATCHES TO PAINFUL AREA AT ONCE FOR UP TO 12 HOURS WITHIN A 24 HOUR PERIOD REMOVE AFTER 12 HOURS    Chronic pain syndrome, 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 (H)       lisinopril 10 MG tablet    PRINIVIL/ZESTRIL    90 tablet    Take 1 tablet (10 mg) by mouth daily    Essential hypertension with goal blood pressure less than 130/80       MEDICATION GIVEN BY INTRATHECAL PUMP - INSTRUCTION      continuous 2/8/18: Pump managed by [facility] in [place] [phone].  Conc: Bupivacaine 20 mg/mL and Fentanyl 2000 mcg/mL, morphine 2 mg/mL Continuous:  Fentanyl 796 mcg/day, Bupivacaine 7.96 mg/day, Morphine 0.796 mg/day  Boluses: Up to 7 boluses per 24-hr period of Bupivicaine 0.5994 mg and Fentanyl 59.9 mcg morphine 0.0599 mg. Pump Refill Date 02/28/18        metFORMIN 500 MG 24 hr tablet    GLUCOPHAGE-XR    90 tablet    Take 1 tablet (500 mg) by mouth daily (with dinner)    Type 2 diabetes mellitus with diabetic peripheral angiopathy and gangrene, without long-term current use of insulin (H)       metoprolol succinate 25 MG 24 hr tablet    TOPROL-XL    30 tablet    TAKE 1 TABLET (25 MG) BY MOUTH DAILY    Old myocardial infarction       MULTIVITAMIN PO      Take 1 tablet by mouth daily        nitroGLYcerin 0.4 MG sublingual tablet    NITROSTAT    25 tablet    Place 1 tablet (0.4 mg) under the tongue every 5 minutes as needed for chest pain if you are still having symptoms after 3 doses (15 minutes) call 911.    Chronic systolic congestive heart failure (H), Old myocardial infarction       omeprazole 20 MG CR capsule    priLOSEC          ondansetron 8 MG ODT tab    ZOFRAN-ODT    30 tablet    Take 1  tablet (8 mg) by mouth every 8 hours as needed    Other migraine without status migrainosus, not intractable       order for DME     1 Month    Equipment being ordered: Depends briefs    Incontinence       order for DME     1 Device    Equipment being ordered: Power Wheelchair    CVA (cerebral infarction), HTN (hypertension)       order for DME     1 Box    Equipment being ordered: Glucerna daily shakes with each meal    Type 2 diabetes mellitus with other diabetic neurological complication       * order for DME     1 Units    Equipment being ordered: Nebulizer and tubing supplies    Simple chronic bronchitis (H)       * order for DME     1 Device    Equipment being ordered: CPAP supplies mask, hose, filters, cushion fax to [facility] at [phone] Disp: 10 DeviceRefills: prn Class: Local PrintStart: 2/10/2017    Chronic obstructive pulmonary disease, unspecified COPD type (H)       * order for DME     10 Device    Equipment being ordered: CPAP supplies mask, hose, filters, cushion  fax to [facility] at [phone]    COPD (chronic obstructive pulmonary disease) (H)       * order for DME     1 Device    Hospital bed with side rails    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 (H)       * order for DME     1 Device    Full electric hospital bed with half rails  Dx: G94498, I110, J449 Length of need: lifetime    Status post bilateral above knee amputation (H)       * order for DME     1 Device    Wheel Chair Cushion: 18 x 18 inch Roho cushion    Status post bilateral above knee amputation (H)       order for DME     1 Package    Equipment being ordered: bandage tape, prefer paper    Burn of skin       pantoprazole 40 MG EC tablet    PROTONIX    30 tablet    Take 1 tablet (40 mg) by mouth daily    Gastroesophageal reflux disease without esophagitis       Phenytoin Sodium Extended 200 MG Caps     60 capsule    Take 200 mg by mouth 2 times daily    Seizure disorder (H)       polyethylene  glycol Packet    MIRALAX/GLYCOLAX     Take 17 g by mouth daily Dissolved in water or juice        pregabalin 75 MG capsule    LYRICA    120 capsule    Take 2 capsules (150 mg) by mouth 2 times daily    Pain in both upper extremities       risperiDONE 0.5 MG tablet    risperDAL     Take 0.5 mg by mouth At Bedtime        silver sulfADIAZINE 1 % cream    SILVADENE    85 g    Apply topically 2 times daily for 7 days    Burn of skin       sucralfate 1 GM tablet    CARAFATE    120 tablet    Take 1 tablet (1 g) by mouth 4 times daily May dissolve in 10 mL water is necessary. (Start upon completion of carafate suspension)    Adjustment disorder with depressed mood       * traZODone 100 MG tablet    DESYREL          * traZODone 50 MG tablet    DESYREL     Take 150 mg by mouth At Bedtime        vitamin D 2000 units tablet     100 tablet    Take 2,000 Units by mouth daily.        zinc 50 MG Tabs      Take 1 tablet by mouth daily        * Notice:  This list has 12 medication(s) that are the same as other medications prescribed for you. Read the directions carefully, and ask your doctor or other care provider to review them with you.

## 2018-04-19 ENCOUNTER — HOSPITAL ENCOUNTER (OUTPATIENT)
Dept: OCCUPATIONAL THERAPY | Facility: CLINIC | Age: 68
Discharge: HOME | End: 2018-04-19
Attending: OCCUPATIONAL THERAPIST | Admitting: OCCUPATIONAL THERAPIST
Payer: COMMERCIAL

## 2018-04-19 DIAGNOSIS — H40.1133 PRIMARY OPEN ANGLE GLAUCOMA OF BOTH EYES, SEVERE STAGE: Primary | ICD-10-CM

## 2018-04-19 PROCEDURE — G8988 SELF CARE GOAL STATUS: HCPCS | Mod: GO,CJ | Performed by: OCCUPATIONAL THERAPIST

## 2018-04-19 PROCEDURE — 40000249 ZZH STATISTIC VISIT LOW VISION CLINIC: Performed by: OCCUPATIONAL THERAPIST

## 2018-04-19 PROCEDURE — 97535 SELF CARE MNGMENT TRAINING: CPT | Mod: GO | Performed by: OCCUPATIONAL THERAPIST

## 2018-04-19 PROCEDURE — 97166 OT EVAL MOD COMPLEX 45 MIN: CPT | Mod: GO | Performed by: OCCUPATIONAL THERAPIST

## 2018-04-19 PROCEDURE — G8987 SELF CARE CURRENT STATUS: HCPCS | Mod: GO,CK | Performed by: OCCUPATIONAL THERAPIST

## 2018-04-19 ASSESSMENT — ACTIVITIES OF DAILY LIVING (ADL)
ADL_EQUIPMENT_USED: ADAPTIVE ADL EQUIPMENT;GRAB BAR;RAISED TOILET SEAT;TUB/SHOWER CHAIR
PRIOR_ADL/IADL_STATUS: INDEPENDENT PRIOR TO ONSET

## 2018-04-19 ASSESSMENT — VISUAL ACUITY
OD: 20/70-2
OS: NLP

## 2018-04-19 NOTE — PROGRESS NOTES
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 Lived very active life. Mulitple losses over the last few years. depression   Prior ADL/IADL status Independent prior to onset   Patient/family Goals Statement cannot see cards for [employer], computer, has smart phone (Android), needs new sunglasses, reading mail   General information comments is anticipating cane training from [employer]   Social History/Home Environment   Living Environment (assistance living, assist with laundry, bathing , meals)   Current Community Support (lost spouse in June 2017, 2 children local)   Patient Role/employment History  Retired  ()   Avocational woodworking,enjoys activities, socializing, books on tape. [employer]   Social/Environment Comment is moving to [place] in August to live with sister   Pain Assessment   Pain Reported Yes   Pain Location pump in back for degenerative disc disease   Pain Scale 8/10   Comments has medical monitoring of pain at pain clinic   Fall Risk Screen   Fall screen completed by OT   Have you fallen 2 or more times in the past year? Yes   Have you fallen and had an injury in the past year? No   Is patient a fall risk? Yes   Fall screen comments sliding board transfer to bed and toilet - independent   Cognitive/Behavioral   Communication Intact    Cognitive Status Intact for evaluation process  (has short term memory loss per pt report)   Behavior Appropriate   Patient/family aware of diagnosis Yes   How well do you understand your eye condition? Well   Vision related restrictions on visiting with family/friends Moderate   Reported emotional impact of visual impairment Moderate   Adjustment to disability Fair   Cognitive/Behavioral Comment pt reports she has had minimal exposure to low vision techniques and devices.    Physical Status/Equipment   Physical Status Impaired balance  (double amputee lower extremity)   Mobility equipment used White cane;Wheelchair  (waiting for power chair, waiting for cane training in chair)   ADL Equipment used Adaptive ADL Equipment;Grab bar;Raised toilet seat;Tub/shower chair   Physical Status/Equipment comments asssit with bathing   Visual Report   Functional Complaints Reading;Homemaking;Safety in mobility  (no depth perception, light and glare management)   Visual Complaints Constricted visual fields right eye;Constricted visual fields left eye;Visual fatigue;Light sensitivity   Mike Bonnet Symptoms? Yes   Magnifier (strength and type) has magnfier- does not help   Reading glasses Trifocal   Technology Computer  (cell phone - android)   Lighting and Glare   Is your lighting adequate? No/ at home   Is glare a problem? Yes/ indoors;Yes/ outdoors   Are you satisfied with your sunglasses? No   Sunglass filter color (reports sunglasses are pretty hold)   Visual Acuity   Acuity right eye 20/70-2   Acuity left eye NLP   Contrast Sensitivity   Contrast sensitivity (score/25) 25/25   MN Read   Smallest print size read unable   Dynavision: Evaluation of visual skills/search of extra personal space via 5X4 foot computerized light board with 64 stimuli.  The user reacts as quickly as possible by striking the lights as they turn on in random succession.   Dynavision Cascade Dynavision Mode A (single stimulus attention, 1 minute    Dynavision Mode A (single stimulus attention, 1 minute)   Dynavision Mode A (SSA, 1 Min) Comment pt with double amputation lower extremities and heart condition. Required to complete testing seated, and secondary to fatigue - pointed to targets rather than reached for them. Formal testing therefore not completed. Pt did scan to visually attend to all quadrants    SRAFVP SCORING   # relevant measures 29   Composite score 28   Percentage of disability (%) 51.72   Education   Learner Patient   Readiness Eager;Acceptance   Method Booklet/handout;Explanation;Demonstration   Response Verbalizes understanding;Demonstrates understanding;Needs reinforcement   Clinical Impression, OT Eval   Criteria for Skilled Therapeutic Interventions Met yes;treatment indicated   Therapy  Diagnosis: Impaired ADL/IADL with deficits in Reading based ADL;Written communication;Home management;Financial management;Grooming;Eating  (location of objects, management of light and glare)   Assessment of Occupational Performance 5 or more Performance Deficits   Identified Performance Deficits as above   Clinical Decision Making (Complexity) Moderate complexity   OT Visual Rehabilitation Evaluation Plan   Therapy Plan Occupational therapy intervention   Planned Interventions Visual field loss awareness;Visual skills training for near tasks;Visual skills training for safety in mobility;Visual skills training for location of objects;Low vision compensatory training for reading;Low vision compensatory training for written communication;Low vision compensatory trainging for financial management;Instruction in environmental adaptations for glare;Instruction in environmental adaptations for contrast;Instruction in environmental adaptations for lighting;Optical device/ADL device instruction and training;Computer modifications;Instruction in community resources   Frequency / Duration 5 tx visits over 12 weeks - 1X every 2-3 weeks for 12 weeks   Risks and  Benefits of Treatment have been explained. Yes   Patient, Family in agreement with plan of care Yes   GOALS   Goals Near Vision;Visual Field;Environmental Modification;Resource Education   Goals Addressed this Session Near vision   Goal 1 - Near Vision   Goal Description: Near Vision Patient will verbalize awareness of visual field Loss and demonstrate improved use of visual skills/adaptive equipment for increased independence in reading-based activities of daily living, written communication and detail ADL tasks.   Target Date 07/12/18   Goal 2 - Visual field   Goal Description: Visual field Patient will verbalize awareness of visual field loss and demonstrate improved use of visual skills for increased safety/ independence in locating objects/obstacles and in navigation   Target Date 07/12/18   Goal 3 - Environmental modification   Goal Description: Environment modification Patient will demonstrate understanding of the impact of lighting, contrast and glare on ADL activities, in conjunction with environmentally-based ADL modifications   Target Date 07/12/18   Goal 4 - Resource education   Goal Description: Resource education Patient will verbalize awareness of community resources for the following:;Audio access to print materials;Access to low vision devices   Target Date 07/12/18   Total Evaluation Time   Total Evaluation Time 55   Therapy Certification   Certification date from 04/19/18   Certification date to 07/12/18   Medical Diagnosis Glaucoma, constricted visual fields

## 2018-04-19 NOTE — PROGRESS NOTES
[facility]          OUTPATIENT OCCUPATIONALTHERAPY  EVALUATION  PLAN OF TREATMENT FOR OUTPATIENT REHABILITATION  (COMPLETE FOR INITIAL CLAIMS ONLY)  Patient's Last Name, First Name, M.I.  YOB: 1950  Sonya Foote         Provider's Name  [facility] Medical Record No.  [id number]   Onset Date:  03/29/2018 Start of Care Date:  04/19/18   Type:     ___PT  _X_OT   ___SLP Medical Diagnosis:  Glaucoma, constricted visual fields   Therapy Diagnosis: Impaired ADL/IADL with deficits in Reading based ADL, Written communication, Home management, Financial management, Grooming, Eating (location of objects, management of light and glare)    Visits from SOC: 1     _________________________________________________________________________________  Plan of Treatment/Functional Goals:  Planned Interventions: Visual field loss awareness, Visual skills training for near tasks, Visual skills training for safety in mobility, Visual skills training for location of objects, Low vision compensatory training for reading, Low vision compensatory training for written communication, Low vision compensatory trainging for financial management, Instruction in environmental adaptations for glare, Instruction in environmental adaptations for contrast, Instruction in environmental adaptations for lighting, Optical device/ADL device instruction and training, Computer modifications, Instruction in community resources        Goals  1. Patient will verbalize awareness of visual field Loss and demonstrate improved use of visual skills/adaptive equipment for increased independence in reading-based activities of daily living, written communication and detail ADL tasks.         Target Date: 07/12/18      2. Patient will verbalize awareness of visual field loss and demonstrate improved use of visual skills for  increased safety/ independence in locating objects/obstacles and in navigation         Target Date: 07/12/18      3.  Patient will demonstrate understanding of the impact of lighting, contrast and glare on ADL activities, in conjunction with environmentally-based ADL modifications         Target Date: 07/12/18      4. Patient will verbalize awareness of community resources for the following:, Audio access to print materials, Access to low vision devices         Target Date: 07/12/18      5.                   6.                    7.                 8.                            Therapy Frequency/Duration:  5 tx visits over 12 weeks - 1X every 2-3 weeks for 12 weeks    Manuela Mitchell, OT       I CERTIFY THE NEED FOR THESE SERVICES FURNISHED UNDER        THIS PLAN OF TREATMENT AND WHILE UNDER MY CARE     (Physician co-signature of this document indicates review and certification of the therapy plan).                  Certification date from: 04/19/18 Certification date to: 07/12/18          Referring Physician: Marely Robin MD     Initial Assessment        See Epic Evaluation Start Of Care Date: 04/19/18     Manuela Mitchell

## 2018-04-20 DIAGNOSIS — Z89.511 STATUS POST BELOW KNEE AMPUTATION OF RIGHT LOWER EXTREMITY (H): ICD-10-CM

## 2018-04-20 DIAGNOSIS — G89.4 CHRONIC PAIN SYNDROME: ICD-10-CM

## 2018-04-20 DIAGNOSIS — R11.0 NAUSEA: ICD-10-CM

## 2018-04-20 RX ORDER — LEVETIRACETAM 500 MG/1
500 TABLET ORAL 2 TIMES DAILY
Qty: 180 TABLET | Refills: 1 | Status: SHIPPED | OUTPATIENT
Start: 2018-04-20 | End: 2019-06-05

## 2018-04-20 RX ORDER — LIDOCAINE 50 MG/G
PATCH TOPICAL
Qty: 30 PATCH | Refills: 5 | Status: SHIPPED | OUTPATIENT
Start: 2018-04-20 | End: 2019-05-23

## 2018-04-20 NOTE — TELEPHONE ENCOUNTER
Requested Prescriptions   Pending Prescriptions Disp Refills     lidocaine (LIDODERM) 5 % Patch  Last Written Prescription Date:  02/13/2018  Last Fill Quantity: 30 patch,  # refills: 5   Last Office Visit: 4/18/2018   Future Office Visit:    Next 5 appointments (look out 90 days)     Jun 26, 2018 10:45 AM CDT   Return Visit with Bj Anderson MD   [facility] ([facility])    [address] [place] [id number] [phone] OPT 2                  30 patch 5     Sig: APPLY 1 TO 3 PATCHES TO PAINFUL AREA AT ONCE FOR UP TO 12 HOURS WITHIN A 24 HOUR PERIOD REMOVE AFTER 12 HOURS    There is no refill protocol information for this order

## 2018-04-20 NOTE — TELEPHONE ENCOUNTER
MD please approve since no up to date labs but 3/20 visit note did say stable for seizures.Kira Hare RN

## 2018-04-20 NOTE — TELEPHONE ENCOUNTER
Requested Prescriptions   Pending Prescriptions Disp Refills     levETIRAcetam (KEPPRA) 500 MG tablet  Last Written Prescription Date:  09/22/2016  Last Fill Quantity: 180 tablet,  # refills: 1   Last Office Visit: 4/18/2018   Future Office Visit:    Next 5 appointments (look out 90 days)     Jun 26, 2018 10:45 AM CDT   Return Visit with Bj Anderson MD   [facility] ([facility])    [address] [place] [id number] [phone] OPT 2                180 tablet 1     Sig: Take 1 tablet (500 mg) by mouth 2 times daily    There is no refill protocol information for this order

## 2018-04-20 NOTE — TELEPHONE ENCOUNTER
Routing refill request to provider for review/approval because:  Drug not on the [facility] refill protocol

## 2018-04-22 ENCOUNTER — NURSE TRIAGE (OUTPATIENT)
Dept: NURSING | Facility: CLINIC | Age: 68
End: 2018-04-22

## 2018-04-22 ENCOUNTER — HOSPITAL ENCOUNTER (EMERGENCY)
Facility: CLINIC | Age: 68
Discharge: HOME | End: 2018-04-22
Attending: FAMILY MEDICINE | Admitting: FAMILY MEDICINE
Payer: COMMERCIAL

## 2018-04-22 VITALS
RESPIRATION RATE: 16 BRPM | DIASTOLIC BLOOD PRESSURE: 95 MMHG | SYSTOLIC BLOOD PRESSURE: 157 MMHG | OXYGEN SATURATION: 96 % | TEMPERATURE: 98.5 F | HEART RATE: 70 BPM

## 2018-04-22 DIAGNOSIS — T24.211D PARTIAL THICKNESS BURN OF RIGHT THIGH, SUBSEQUENT ENCOUNTER: ICD-10-CM

## 2018-04-22 DIAGNOSIS — T83.090A OBSTRUCTION OF SUPRAPUBIC CATHETER, INITIAL ENCOUNTER (H): ICD-10-CM

## 2018-04-22 DIAGNOSIS — R39.9 UTI SYMPTOMS: ICD-10-CM

## 2018-04-22 DIAGNOSIS — J20.9 ACUTE BRONCHITIS, UNSPECIFIED ORGANISM: ICD-10-CM

## 2018-04-22 LAB
ALBUMIN UR-MCNC: NEGATIVE MG/DL
APPEARANCE UR: CLEAR
BILIRUB UR QL STRIP: NEGATIVE
COLOR UR AUTO: YELLOW
GLUCOSE UR STRIP-MCNC: NEGATIVE MG/DL
HGB UR QL STRIP: ABNORMAL
KETONES UR STRIP-MCNC: NEGATIVE MG/DL
LEUKOCYTE ESTERASE UR QL STRIP: NEGATIVE
MUCOUS THREADS #/AREA URNS LPF: PRESENT /LPF
NITRATE UR QL: NEGATIVE
PH UR STRIP: 7.5 PH (ref 5–7)
RBC #/AREA URNS AUTO: 27 /HPF (ref 0–2)
SOURCE: ABNORMAL
SP GR UR STRIP: 1.01 (ref 1–1.03)
UROBILINOGEN UR STRIP-MCNC: NORMAL MG/DL (ref 0–2)
WBC #/AREA URNS AUTO: 6 /HPF (ref 0–5)

## 2018-04-22 PROCEDURE — 99283 EMERGENCY DEPT VISIT LOW MDM: CPT | Mod: 25 | Performed by: FAMILY MEDICINE

## 2018-04-22 PROCEDURE — 25000132 ZZH RX MED GY IP 250 OP 250 PS 637: Performed by: FAMILY MEDICINE

## 2018-04-22 PROCEDURE — 99284 EMERGENCY DEPT VISIT MOD MDM: CPT | Mod: Z6 | Performed by: FAMILY MEDICINE

## 2018-04-22 PROCEDURE — 51798 US URINE CAPACITY MEASURE: CPT | Mod: 59 | Performed by: FAMILY MEDICINE

## 2018-04-22 PROCEDURE — 25000125 ZZHC RX 250: Performed by: FAMILY MEDICINE

## 2018-04-22 PROCEDURE — 16020 DRESS/DEBRID P-THICK BURN S: CPT | Performed by: FAMILY MEDICINE

## 2018-04-22 PROCEDURE — 81001 URINALYSIS AUTO W/SCOPE: CPT | Performed by: FAMILY MEDICINE

## 2018-04-22 RX ORDER — IBUPROFEN 600 MG/1
600 TABLET, FILM COATED ORAL ONCE
Status: COMPLETED | OUTPATIENT
Start: 2018-04-22 | End: 2018-04-22

## 2018-04-22 RX ORDER — CIPROFLOXACIN 500 MG/1
500 TABLET, FILM COATED ORAL ONCE
Status: COMPLETED | OUTPATIENT
Start: 2018-04-22 | End: 2018-04-22

## 2018-04-22 RX ORDER — CIPROFLOXACIN 500 MG/1
500 TABLET, FILM COATED ORAL 2 TIMES DAILY
Qty: 14 TABLET | Refills: 0 | Status: SHIPPED | OUTPATIENT
Start: 2018-04-22 | End: 2018-04-29

## 2018-04-22 RX ORDER — SILVER SULFADIAZINE 10 MG/G
50 CREAM TOPICAL ONCE
Status: COMPLETED | OUTPATIENT
Start: 2018-04-22 | End: 2018-04-22

## 2018-04-22 RX ADMIN — IBUPROFEN 600 MG: 600 TABLET ORAL at 08:17

## 2018-04-22 RX ADMIN — CIPROFLOXACIN HYDROCHLORIDE 500 MG: 500 TABLET, FILM COATED ORAL at 08:17

## 2018-04-22 RX ADMIN — SILVER SULFADIAZINE 50 G: 10 CREAM TOPICAL at 08:17

## 2018-04-22 ASSESSMENT — ENCOUNTER SYMPTOMS
VOMITING: 0
ARTHRALGIAS: 0
HEMATURIA: 1
ACTIVITY CHANGE: 1
DIFFICULTY URINATING: 1
WHEEZING: 0
NUMBNESS: 0
COLOR CHANGE: 0
FEVER: 0
DYSURIA: 1
HEADACHES: 0
COUGH: 1
CHILLS: 0
ABDOMINAL PAIN: 0
CONFUSION: 0
SHORTNESS OF BREATH: 0
DYSPHORIC MOOD: 1
WEAKNESS: 0
TREMORS: 0
FATIGUE: 0
NAUSEA: 0
BRUISES/BLEEDS EASILY: 0
EYE REDNESS: 0
NECK STIFFNESS: 0
DECREASED CONCENTRATION: 1

## 2018-04-22 NOTE — TELEPHONE ENCOUNTER
Per assisted living nurse Alannah. Patient was seen in ER today for an obstruction in her catheter.  Was sent home with Ciprofloxacin to take for 7 days.  Patient is currently on Doxycyline.  Caller is wanting to know if the Patient is supposed to take both of the antibiotics or stop the Doxycycline.    This RN called the [facility] ER and spoke with nurse Schaffer. She spoke with the provider. Advised that the Patient should take the Ciprofloxacin for 7 days as directed and when that medication is completed she should start taking the Doxycycline again until that is completed.     This information was called to Alannah the assisted living nurse.  She has no further questions.    Advised to call back if further questions or concerns.   Reason for Disposition    Caller has URGENT medication question about med that PCP prescribed and triager unable to answer question    Protocols used: MEDICATION QUESTION CALL-ADULT-

## 2018-04-22 NOTE — ED TRIAGE NOTES
Pt has an indwelling harrell cath and c/o 10 days of RLQ.  Pt placed on antibiotic.  911 called this am for passing clot and urine around harrell cath.  Pt above the knee amputation bilat.  GCS 15
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
## 2018-04-22 NOTE — ED AVS SNAPSHOT
[facility], [place], Emergency Department    [address] [place] [id number]    Phone:  [phone]                                       Sonya Foote   MRN: [id number]    Department:  [facility], Emergency Department   Date of Visit:  4/22/2018           After Visit Summary Signature Page     I have received my discharge instructions, and my questions have been answered. I have discussed any challenges I see with this plan with the nurse or doctor.    ..........................................................................................................................................  Patient/Patient Representative Signature      ..........................................................................................................................................  Patient Representative Print Name and Relationship to Patient    ..................................................               ................................................  Date                                            Time    ..........................................................................................................................................  Reviewed by Signature/Title    ...................................................              ..............................................  Date                                                            Time

## 2018-04-23 ENCOUNTER — PATIENT OUTREACH (OUTPATIENT)
Dept: GERIATRIC MEDICINE | Facility: CLINIC | Age: 68
End: 2018-04-23

## 2018-04-23 DIAGNOSIS — Z76.89 HEALTH CARE HOME: Chronic | ICD-10-CM

## 2018-04-23 NOTE — PROGRESS NOTES
"[facility] Care Coordination Contact  CM received notification of ED visit for blocked catheter.  CM received call from member - she states she is happy she didn't have to stay in hospital  - states she is on atbx.       She states that her reacher broke and is requesting a new reacher. CM placed call to [facility]  - spoke with Walker.  Ordered 32\" reacher - EW.   Walker will send quote.      CPS/POC updated and sent to CMS for auth.     Jamie Sharma RN, PHN  [facility]              Sonya Foote 1950 Reacher 4/24/2018 Jamie Delivered 4/30/18     Per APA item delivered, CM notified    Irene Digatoyoselyn  Case Management Specialist   [facility]   [phone]    "
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
## 2018-04-24 ENCOUNTER — DOCUMENTATION ONLY (OUTPATIENT)
Dept: CARE COORDINATION | Facility: CLINIC | Age: 68
End: 2018-04-24
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
## 2018-05-01 ENCOUNTER — HOSPITAL ENCOUNTER (OUTPATIENT)
Dept: OCCUPATIONAL THERAPY | Facility: CLINIC | Age: 68
Setting detail: THERAPIES SERIES
End: 2018-05-01
Attending: INTERNAL MEDICINE
Payer: COMMERCIAL

## 2018-05-01 PROCEDURE — 97535 SELF CARE MNGMENT TRAINING: CPT | Mod: GO | Performed by: OCCUPATIONAL THERAPIST

## 2018-05-01 PROCEDURE — 40000249 ZZH STATISTIC VISIT LOW VISION CLINIC: Performed by: OCCUPATIONAL THERAPIST

## 2018-05-01 NOTE — DISCHARGE INSTRUCTIONS
Cell Phone Adaptations  1. We changed the contrast to white on black  a. Settings  b. Accessibility  c.        Vision  d. Invert Contrast  2. We increased the font size  a. Settings  b. Accessibility  c.       Vision  d. Font -Slid bar to huge  3. Google Voice  Touch the microphone on the google bar on your home screen  i. What is the weather  ii. Call my sister  iii. Who won [place] game  iv. Send a text message  v. Read my text message  vi. Add calendar appointment  vii. What is on my calendar  Sunglasses:   Page 46, Low Vision Cocoons   Large 628460, $49.95  Lighting:  Full spectrum, natural daylight gooseneck lights are featured on pages 48 and 49.      Manuela Mitchell, OTR/L  [phone]

## 2018-05-02 ENCOUNTER — APPOINTMENT (OUTPATIENT)
Dept: CT IMAGING | Facility: CLINIC | Age: 68
End: 2018-05-02
Attending: EMERGENCY MEDICINE
Payer: COMMERCIAL

## 2018-05-02 ENCOUNTER — APPOINTMENT (OUTPATIENT)
Dept: CARDIOLOGY | Facility: CLINIC | Age: 68
End: 2018-05-02
Attending: NURSE PRACTITIONER
Payer: COMMERCIAL

## 2018-05-02 ENCOUNTER — PATIENT OUTREACH (OUTPATIENT)
Dept: GERIATRIC MEDICINE | Facility: CLINIC | Age: 68
End: 2018-05-02

## 2018-05-02 ENCOUNTER — APPOINTMENT (OUTPATIENT)
Dept: GENERAL RADIOLOGY | Facility: CLINIC | Age: 68
End: 2018-05-02
Attending: EMERGENCY MEDICINE
Payer: COMMERCIAL

## 2018-05-02 ENCOUNTER — APPOINTMENT (OUTPATIENT)
Dept: CARDIOLOGY | Facility: CLINIC | Age: 68
End: 2018-05-02
Attending: PHYSICIAN ASSISTANT
Payer: COMMERCIAL

## 2018-05-02 ENCOUNTER — HOSPITAL ENCOUNTER (OUTPATIENT)
Facility: CLINIC | Age: 68
Setting detail: OBSERVATION
Discharge: HOME | End: 2018-05-03
Attending: EMERGENCY MEDICINE | Admitting: INTERNAL MEDICINE
Payer: COMMERCIAL

## 2018-05-02 DIAGNOSIS — Z76.89 HEALTH CARE HOME: Chronic | ICD-10-CM

## 2018-05-02 DIAGNOSIS — T30.0 BURN: Primary | ICD-10-CM

## 2018-05-02 DIAGNOSIS — E87.6 HYPOKALEMIA: ICD-10-CM

## 2018-05-02 DIAGNOSIS — Z86.79 HISTORY OF CORONARY ARTERY DISEASE: ICD-10-CM

## 2018-05-02 DIAGNOSIS — F17.200 TOBACCO DEPENDENCE SYNDROME: ICD-10-CM

## 2018-05-02 DIAGNOSIS — I69.30 LATE EFFECT OF STROKE: ICD-10-CM

## 2018-05-02 DIAGNOSIS — R94.39 ABNORMAL CARDIOVASCULAR STRESS TEST: ICD-10-CM

## 2018-05-02 DIAGNOSIS — R07.9 ACUTE CHEST PAIN: ICD-10-CM

## 2018-05-02 LAB
ALBUMIN SERPL-MCNC: 2.7 G/DL (ref 3.4–5)
ALP SERPL-CCNC: 228 U/L (ref 40–150)
ALT SERPL W P-5'-P-CCNC: 44 U/L (ref 0–50)
ANION GAP SERPL CALCULATED.3IONS-SCNC: 8 MMOL/L (ref 3–14)
AST SERPL W P-5'-P-CCNC: 38 U/L (ref 0–45)
BASOPHILS # BLD AUTO: 0 10E9/L (ref 0–0.2)
BASOPHILS NFR BLD AUTO: 0.2 %
BILIRUB SERPL-MCNC: 0.1 MG/DL (ref 0.2–1.3)
BUN SERPL-MCNC: 6 MG/DL (ref 7–30)
CALCIUM SERPL-MCNC: 8.5 MG/DL (ref 8.5–10.1)
CHLORIDE SERPL-SCNC: 105 MMOL/L (ref 94–109)
CO2 SERPL-SCNC: 28 MMOL/L (ref 20–32)
CREAT SERPL-MCNC: 0.52 MG/DL (ref 0.52–1.04)
D DIMER PPP FEU-MCNC: 0.7 UG/ML FEU (ref 0–0.5)
DIFFERENTIAL METHOD BLD: ABNORMAL
EOSINOPHIL # BLD AUTO: 0.2 10E9/L (ref 0–0.7)
EOSINOPHIL NFR BLD AUTO: 2.6 %
ERYTHROCYTE [DISTWIDTH] IN BLOOD BY AUTOMATED COUNT: 14.5 % (ref 10–15)
GFR SERPL CREATININE-BSD FRML MDRD: >90 ML/MIN/1.7M2
GLUCOSE BLDC GLUCOMTR-MCNC: 78 MG/DL (ref 70–99)
GLUCOSE BLDC GLUCOMTR-MCNC: 90 MG/DL (ref 70–99)
GLUCOSE BLDC GLUCOMTR-MCNC: 90 MG/DL (ref 70–99)
GLUCOSE BLDC GLUCOMTR-MCNC: 96 MG/DL (ref 70–99)
GLUCOSE SERPL-MCNC: 83 MG/DL (ref 70–99)
HBA1C MFR BLD: 4.4 % (ref 0–6.4)
HCT VFR BLD AUTO: 33.5 % (ref 35–47)
HGB BLD-MCNC: 11.4 G/DL (ref 11.7–15.7)
IMM GRANULOCYTES # BLD: 0 10E9/L (ref 0–0.4)
IMM GRANULOCYTES NFR BLD: 0.4 %
INTERPRETATION ECG - MUSE: NORMAL
INTERPRETATION ECG - MUSE: NORMAL
LIPASE SERPL-CCNC: 130 U/L (ref 73–393)
LYMPHOCYTES # BLD AUTO: 1.9 10E9/L (ref 0.8–5.3)
LYMPHOCYTES NFR BLD AUTO: 21.1 %
MAGNESIUM SERPL-MCNC: 1.9 MG/DL (ref 1.6–2.3)
MCH RBC QN AUTO: 29.8 PG (ref 26.5–33)
MCHC RBC AUTO-ENTMCNC: 34 G/DL (ref 31.5–36.5)
MCV RBC AUTO: 88 FL (ref 78–100)
MONOCYTES # BLD AUTO: 1 10E9/L (ref 0–1.3)
MONOCYTES NFR BLD AUTO: 11.1 %
NEUTROPHILS # BLD AUTO: 5.8 10E9/L (ref 1.6–8.3)
NEUTROPHILS NFR BLD AUTO: 64.6 %
NRBC # BLD AUTO: 0 10*3/UL
NRBC BLD AUTO-RTO: 0 /100
PLATELET # BLD AUTO: 253 10E9/L (ref 150–450)
POTASSIUM SERPL-SCNC: 2.9 MMOL/L (ref 3.4–5.3)
POTASSIUM SERPL-SCNC: 3.4 MMOL/L (ref 3.4–5.3)
PROT SERPL-MCNC: 7.3 G/DL (ref 6.8–8.8)
RBC # BLD AUTO: 3.83 10E12/L (ref 3.8–5.2)
SODIUM SERPL-SCNC: 141 MMOL/L (ref 133–144)
SPECIMEN SOURCE: NORMAL
TROPONIN I SERPL-MCNC: <0.015 UG/L (ref 0–0.04)
WBC # BLD AUTO: 9 10E9/L (ref 4–11)
WET PREP SPEC: NORMAL

## 2018-05-02 PROCEDURE — 25000125 ZZHC RX 250: Performed by: INTERNAL MEDICINE

## 2018-05-02 PROCEDURE — 94660 CPAP INITIATION&MGMT: CPT

## 2018-05-02 PROCEDURE — 96374 THER/PROPH/DIAG INJ IV PUSH: CPT | Mod: 59

## 2018-05-02 PROCEDURE — 71260 CT THORAX DX C+: CPT

## 2018-05-02 PROCEDURE — 84132 ASSAY OF SERUM POTASSIUM: CPT | Mod: 91 | Performed by: PHYSICIAN ASSISTANT

## 2018-05-02 PROCEDURE — 84484 ASSAY OF TROPONIN QUANT: CPT | Performed by: EMERGENCY MEDICINE

## 2018-05-02 PROCEDURE — C1769 GUIDE WIRE: HCPCS

## 2018-05-02 PROCEDURE — 96376 TX/PRO/DX INJ SAME DRUG ADON: CPT | Mod: 59

## 2018-05-02 PROCEDURE — 99152 MOD SED SAME PHYS/QHP 5/>YRS: CPT | Performed by: INTERNAL MEDICINE

## 2018-05-02 PROCEDURE — 25000128 H RX IP 250 OP 636: Performed by: EMERGENCY MEDICINE

## 2018-05-02 PROCEDURE — 36415 COLL VENOUS BLD VENIPUNCTURE: CPT | Performed by: INTERNAL MEDICINE

## 2018-05-02 PROCEDURE — 99285 EMERGENCY DEPT VISIT HI MDM: CPT | Mod: 25

## 2018-05-02 PROCEDURE — 99220 ZZC INITIAL OBSERVATION CARE,LEVL III: CPT | Performed by: INTERNAL MEDICINE

## 2018-05-02 PROCEDURE — 93458 L HRT ARTERY/VENTRICLE ANGIO: CPT | Mod: 26 | Performed by: INTERNAL MEDICINE

## 2018-05-02 PROCEDURE — 84484 ASSAY OF TROPONIN QUANT: CPT | Performed by: INTERNAL MEDICINE

## 2018-05-02 PROCEDURE — 94640 AIRWAY INHALATION TREATMENT: CPT

## 2018-05-02 PROCEDURE — G0378 HOSPITAL OBSERVATION PER HR: HCPCS

## 2018-05-02 PROCEDURE — 27210856 ZZH ACCESS HEART CATH CR2

## 2018-05-02 PROCEDURE — 40000275 ZZH STATISTIC RCP TIME EA 10 MIN

## 2018-05-02 PROCEDURE — 27211089 ZZH KIT ACIST INJECTOR CR3

## 2018-05-02 PROCEDURE — 99207 ZZC APP CREDIT; MD BILLING SHARED VISIT: CPT | Performed by: PHYSICIAN ASSISTANT

## 2018-05-02 PROCEDURE — 27210795 ZZH PAD DEFIB QUICK CR4

## 2018-05-02 PROCEDURE — 83735 ASSAY OF MAGNESIUM: CPT | Performed by: PHYSICIAN ASSISTANT

## 2018-05-02 PROCEDURE — 25000125 ZZHC RX 250: Performed by: EMERGENCY MEDICINE

## 2018-05-02 PROCEDURE — 85025 COMPLETE CBC W/AUTO DIFF WBC: CPT | Performed by: EMERGENCY MEDICINE

## 2018-05-02 PROCEDURE — 99152 MOD SED SAME PHYS/QHP 5/>YRS: CPT

## 2018-05-02 PROCEDURE — 25000132 ZZH RX MED GY IP 250 OP 250 PS 637: Performed by: EMERGENCY MEDICINE

## 2018-05-02 PROCEDURE — 27210787 ZZH MANIFOLD CR2

## 2018-05-02 PROCEDURE — 25000132 ZZH RX MED GY IP 250 OP 250 PS 637: Performed by: PHYSICIAN ASSISTANT

## 2018-05-02 PROCEDURE — 99215 OFFICE O/P EST HI 40 MIN: CPT | Mod: 25 | Performed by: INTERNAL MEDICINE

## 2018-05-02 PROCEDURE — 93005 ELECTROCARDIOGRAM TRACING: CPT | Mod: 76,59

## 2018-05-02 PROCEDURE — 83690 ASSAY OF LIPASE: CPT | Performed by: EMERGENCY MEDICINE

## 2018-05-02 PROCEDURE — 85379 FIBRIN DEGRADATION QUANT: CPT | Performed by: EMERGENCY MEDICINE

## 2018-05-02 PROCEDURE — 93458 L HRT ARTERY/VENTRICLE ANGIO: CPT

## 2018-05-02 PROCEDURE — 93306 TTE W/DOPPLER COMPLETE: CPT

## 2018-05-02 PROCEDURE — 93571 IV DOP VEL&/PRESS C FLO 1ST: CPT | Mod: 26 | Performed by: INTERNAL MEDICINE

## 2018-05-02 PROCEDURE — 25000132 ZZH RX MED GY IP 250 OP 250 PS 637: Performed by: NURSE PRACTITIONER

## 2018-05-02 PROCEDURE — 27210946 ZZH KIT HC TOTES DISP CR8

## 2018-05-02 PROCEDURE — 83036 HEMOGLOBIN GLYCOSYLATED A1C: CPT | Performed by: INTERNAL MEDICINE

## 2018-05-02 PROCEDURE — 99153 MOD SED SAME PHYS/QHP EA: CPT

## 2018-05-02 PROCEDURE — 93571 IV DOP VEL&/PRESS C FLO 1ST: CPT | Mod: 52

## 2018-05-02 PROCEDURE — 87210 SMEAR WET MOUNT SALINE/INK: CPT | Performed by: EMERGENCY MEDICINE

## 2018-05-02 PROCEDURE — 36415 COLL VENOUS BLD VENIPUNCTURE: CPT | Performed by: PHYSICIAN ASSISTANT

## 2018-05-02 PROCEDURE — 93308 TTE F-UP OR LMTD: CPT | Mod: XU

## 2018-05-02 PROCEDURE — 25000132 ZZH RX MED GY IP 250 OP 250 PS 637: Performed by: INTERNAL MEDICINE

## 2018-05-02 PROCEDURE — 25000128 H RX IP 250 OP 636: Performed by: INTERNAL MEDICINE

## 2018-05-02 PROCEDURE — 71046 X-RAY EXAM CHEST 2 VIEWS: CPT

## 2018-05-02 PROCEDURE — 80053 COMPREHEN METABOLIC PANEL: CPT | Performed by: EMERGENCY MEDICINE

## 2018-05-02 PROCEDURE — 00000146 ZZHCL STATISTIC GLUCOSE BY METER IP

## 2018-05-02 PROCEDURE — 93306 TTE W/DOPPLER COMPLETE: CPT | Mod: 26 | Performed by: INTERNAL MEDICINE

## 2018-05-02 RX ORDER — ACETAMINOPHEN 325 MG/1
325-650 TABLET ORAL EVERY 4 HOURS PRN
Status: DISCONTINUED | OUTPATIENT
Start: 2018-05-02 | End: 2018-05-03 | Stop reason: HOSPADM

## 2018-05-02 RX ORDER — FENTANYL CITRATE 50 UG/ML
25-50 INJECTION, SOLUTION INTRAMUSCULAR; INTRAVENOUS
Status: DISCONTINUED | OUTPATIENT
Start: 2018-05-02 | End: 2018-05-02 | Stop reason: HOSPADM

## 2018-05-02 RX ORDER — BRIMONIDINE TARTRATE 2 MG/ML
1 SOLUTION/ DROPS OPHTHALMIC 2 TIMES DAILY
Status: DISCONTINUED | OUTPATIENT
Start: 2018-05-02 | End: 2018-05-03 | Stop reason: HOSPADM

## 2018-05-02 RX ORDER — MORPHINE SULFATE 4 MG/ML
4 INJECTION, SOLUTION INTRAMUSCULAR; INTRAVENOUS
Status: COMPLETED | OUTPATIENT
Start: 2018-05-02 | End: 2018-05-02

## 2018-05-02 RX ORDER — ATORVASTATIN CALCIUM 40 MG/1
40 TABLET, FILM COATED ORAL DAILY
Status: DISCONTINUED | OUTPATIENT
Start: 2018-05-02 | End: 2018-05-03 | Stop reason: HOSPADM

## 2018-05-02 RX ORDER — DORZOLAMIDE HCL 20 MG/ML
1 SOLUTION/ DROPS OPHTHALMIC 2 TIMES DAILY
Status: DISCONTINUED | OUTPATIENT
Start: 2018-05-02 | End: 2018-05-02

## 2018-05-02 RX ORDER — LORAZEPAM 0.5 MG/1
0.5 TABLET ORAL
Status: DISCONTINUED | OUTPATIENT
Start: 2018-05-02 | End: 2018-05-02 | Stop reason: HOSPADM

## 2018-05-02 RX ORDER — LIDOCAINE 40 MG/G
CREAM TOPICAL
Status: DISCONTINUED | OUTPATIENT
Start: 2018-05-02 | End: 2018-05-03 | Stop reason: HOSPADM

## 2018-05-02 RX ORDER — DIPHENHYDRAMINE HYDROCHLORIDE 50 MG/ML
25-50 INJECTION INTRAMUSCULAR; INTRAVENOUS
Status: DISCONTINUED | OUTPATIENT
Start: 2018-05-02 | End: 2018-05-02 | Stop reason: HOSPADM

## 2018-05-02 RX ORDER — LORAZEPAM 2 MG/ML
.5-2 INJECTION INTRAMUSCULAR EVERY 4 HOURS PRN
Status: DISCONTINUED | OUTPATIENT
Start: 2018-05-02 | End: 2018-05-02 | Stop reason: HOSPADM

## 2018-05-02 RX ORDER — LISINOPRIL 10 MG/1
10 TABLET ORAL DAILY
Status: DISCONTINUED | OUTPATIENT
Start: 2018-05-02 | End: 2018-05-03

## 2018-05-02 RX ORDER — ASPIRIN 81 MG/1
81 TABLET ORAL DAILY
Status: DISCONTINUED | OUTPATIENT
Start: 2018-05-03 | End: 2018-05-03 | Stop reason: HOSPADM

## 2018-05-02 RX ORDER — NICARDIPINE HYDROCHLORIDE 2.5 MG/ML
100 INJECTION INTRAVENOUS
Status: DISCONTINUED | OUTPATIENT
Start: 2018-05-02 | End: 2018-05-02 | Stop reason: HOSPADM

## 2018-05-02 RX ORDER — LACTULOSE 10 G/15ML
20 SOLUTION ORAL DAILY PRN
Status: DISCONTINUED | OUTPATIENT
Start: 2018-05-02 | End: 2018-05-03 | Stop reason: HOSPADM

## 2018-05-02 RX ORDER — LATANOPROST 50 UG/ML
1 SOLUTION/ DROPS OPHTHALMIC AT BEDTIME
Status: DISCONTINUED | OUTPATIENT
Start: 2018-05-02 | End: 2018-05-03 | Stop reason: HOSPADM

## 2018-05-02 RX ORDER — NITROGLYCERIN 0.4 MG/1
0.4 TABLET SUBLINGUAL EVERY 5 MIN PRN
Status: DISCONTINUED | OUTPATIENT
Start: 2018-05-02 | End: 2018-05-02 | Stop reason: HOSPADM

## 2018-05-02 RX ORDER — NICOTINE POLACRILEX 4 MG
15-30 LOZENGE BUCCAL
Status: DISCONTINUED | OUTPATIENT
Start: 2018-05-02 | End: 2018-05-03 | Stop reason: HOSPADM

## 2018-05-02 RX ORDER — NITROGLYCERIN 0.4 MG/1
0.4 TABLET SUBLINGUAL EVERY 5 MIN PRN
Status: DISCONTINUED | OUTPATIENT
Start: 2018-05-02 | End: 2018-05-03 | Stop reason: HOSPADM

## 2018-05-02 RX ORDER — ASPIRIN 325 MG
325 TABLET ORAL
Status: DISCONTINUED | OUTPATIENT
Start: 2018-05-02 | End: 2018-05-02 | Stop reason: HOSPADM

## 2018-05-02 RX ORDER — POTASSIUM CHLORIDE 7.45 MG/ML
10 INJECTION INTRAVENOUS
Status: DISCONTINUED | OUTPATIENT
Start: 2018-05-02 | End: 2018-05-03 | Stop reason: HOSPADM

## 2018-05-02 RX ORDER — POTASSIUM CHLORIDE 7.45 MG/ML
10 INJECTION INTRAVENOUS
Status: DISCONTINUED | OUTPATIENT
Start: 2018-05-02 | End: 2018-05-02 | Stop reason: HOSPADM

## 2018-05-02 RX ORDER — HYDRALAZINE HYDROCHLORIDE 20 MG/ML
10-20 INJECTION INTRAMUSCULAR; INTRAVENOUS
Status: DISCONTINUED | OUTPATIENT
Start: 2018-05-02 | End: 2018-05-02 | Stop reason: HOSPADM

## 2018-05-02 RX ORDER — FLUMAZENIL 0.1 MG/ML
0.2 INJECTION, SOLUTION INTRAVENOUS
Status: DISCONTINUED | OUTPATIENT
Start: 2018-05-02 | End: 2018-05-02 | Stop reason: HOSPADM

## 2018-05-02 RX ORDER — ESCITALOPRAM OXALATE 10 MG/1
10 TABLET ORAL DAILY
Status: DISCONTINUED | OUTPATIENT
Start: 2018-05-02 | End: 2018-05-03 | Stop reason: HOSPADM

## 2018-05-02 RX ORDER — PRASUGREL 10 MG/1
10-60 TABLET, FILM COATED ORAL
Status: DISCONTINUED | OUTPATIENT
Start: 2018-05-02 | End: 2018-05-02 | Stop reason: HOSPADM

## 2018-05-02 RX ORDER — ATROPINE SULFATE 0.1 MG/ML
0.5 INJECTION INTRAVENOUS EVERY 5 MIN PRN
Status: ACTIVE | OUTPATIENT
Start: 2018-05-02 | End: 2018-05-03

## 2018-05-02 RX ORDER — POTASSIUM CL/LIDO/0.9 % NACL 10MEQ/0.1L
10 INTRAVENOUS SOLUTION, PIGGYBACK (ML) INTRAVENOUS
Status: DISCONTINUED | OUTPATIENT
Start: 2018-05-02 | End: 2018-05-02

## 2018-05-02 RX ORDER — METFORMIN HCL 500 MG
500 TABLET, EXTENDED RELEASE 24 HR ORAL
Status: DISCONTINUED | OUTPATIENT
Start: 2018-05-02 | End: 2018-05-03 | Stop reason: HOSPADM

## 2018-05-02 RX ORDER — NITROGLYCERIN 5 MG/ML
100-500 VIAL (ML) INTRAVENOUS
Status: DISCONTINUED | OUTPATIENT
Start: 2018-05-02 | End: 2018-05-02 | Stop reason: HOSPADM

## 2018-05-02 RX ORDER — ASPIRIN 81 MG/1
81 TABLET ORAL DAILY
Status: DISCONTINUED | OUTPATIENT
Start: 2018-05-02 | End: 2018-05-02

## 2018-05-02 RX ORDER — POTASSIUM CHLORIDE 1500 MG/1
20 TABLET, EXTENDED RELEASE ORAL
Status: COMPLETED | OUTPATIENT
Start: 2018-05-02 | End: 2018-05-02

## 2018-05-02 RX ORDER — EPINEPHRINE 1 MG/ML
0.3 INJECTION, SOLUTION, CONCENTRATE INTRAVENOUS
Status: DISCONTINUED | OUTPATIENT
Start: 2018-05-02 | End: 2018-05-02 | Stop reason: HOSPADM

## 2018-05-02 RX ORDER — IOPAMIDOL 755 MG/ML
56 INJECTION, SOLUTION INTRAVASCULAR ONCE
Status: COMPLETED | OUTPATIENT
Start: 2018-05-02 | End: 2018-05-02

## 2018-05-02 RX ORDER — METHYLPREDNISOLONE SODIUM SUCCINATE 125 MG/2ML
125 INJECTION, POWDER, LYOPHILIZED, FOR SOLUTION INTRAMUSCULAR; INTRAVENOUS
Status: DISCONTINUED | OUTPATIENT
Start: 2018-05-02 | End: 2018-05-02 | Stop reason: HOSPADM

## 2018-05-02 RX ORDER — CLOPIDOGREL BISULFATE 75 MG/1
75 TABLET ORAL
Status: DISCONTINUED | OUTPATIENT
Start: 2018-05-02 | End: 2018-05-02 | Stop reason: HOSPADM

## 2018-05-02 RX ORDER — NIFEDIPINE 10 MG/1
10 CAPSULE ORAL
Status: DISCONTINUED | OUTPATIENT
Start: 2018-05-02 | End: 2018-05-02 | Stop reason: HOSPADM

## 2018-05-02 RX ORDER — BUPIVACAINE HYDROCHLORIDE 2.5 MG/ML
1-10 INJECTION, SOLUTION EPIDURAL; INFILTRATION; INTRACAUDAL
Status: DISCONTINUED | OUTPATIENT
Start: 2018-05-02 | End: 2018-05-02 | Stop reason: HOSPADM

## 2018-05-02 RX ORDER — SODIUM NITROPRUSSIDE 25 MG/ML
100-200 INJECTION INTRAVENOUS
Status: DISCONTINUED | OUTPATIENT
Start: 2018-05-02 | End: 2018-05-02 | Stop reason: HOSPADM

## 2018-05-02 RX ORDER — POTASSIUM CHLORIDE 1.5 G/1.58G
40 POWDER, FOR SOLUTION ORAL ONCE
Status: COMPLETED | OUTPATIENT
Start: 2018-05-02 | End: 2018-05-02

## 2018-05-02 RX ORDER — DEXTROSE MONOHYDRATE 25 G/50ML
25-50 INJECTION, SOLUTION INTRAVENOUS
Status: DISCONTINUED | OUTPATIENT
Start: 2018-05-02 | End: 2018-05-03 | Stop reason: HOSPADM

## 2018-05-02 RX ORDER — POTASSIUM CL/LIDO/0.9 % NACL 10MEQ/0.1L
10 INTRAVENOUS SOLUTION, PIGGYBACK (ML) INTRAVENOUS
Status: DISCONTINUED | OUTPATIENT
Start: 2018-05-02 | End: 2018-05-03 | Stop reason: HOSPADM

## 2018-05-02 RX ORDER — NITROGLYCERIN 5 MG/ML
100-200 VIAL (ML) INTRAVENOUS
Status: DISCONTINUED | OUTPATIENT
Start: 2018-05-02 | End: 2018-05-02 | Stop reason: HOSPADM

## 2018-05-02 RX ORDER — METOPROLOL TARTRATE 1 MG/ML
5 INJECTION, SOLUTION INTRAVENOUS EVERY 5 MIN PRN
Status: DISCONTINUED | OUTPATIENT
Start: 2018-05-02 | End: 2018-05-02 | Stop reason: HOSPADM

## 2018-05-02 RX ORDER — PHENYTOIN SODIUM 100 MG/1
200 CAPSULE, EXTENDED RELEASE ORAL 2 TIMES DAILY
Status: DISCONTINUED | OUTPATIENT
Start: 2018-05-02 | End: 2018-05-03 | Stop reason: HOSPADM

## 2018-05-02 RX ORDER — POTASSIUM CHLORIDE 1.5 G/1.58G
20-40 POWDER, FOR SOLUTION ORAL
Status: DISCONTINUED | OUTPATIENT
Start: 2018-05-02 | End: 2018-05-03 | Stop reason: HOSPADM

## 2018-05-02 RX ORDER — POTASSIUM CHLORIDE 29.8 MG/ML
20 INJECTION INTRAVENOUS
Status: DISCONTINUED | OUTPATIENT
Start: 2018-05-02 | End: 2018-05-02 | Stop reason: HOSPADM

## 2018-05-02 RX ORDER — DOBUTAMINE HYDROCHLORIDE 200 MG/100ML
2-20 INJECTION INTRAVENOUS CONTINUOUS PRN
Status: DISCONTINUED | OUTPATIENT
Start: 2018-05-02 | End: 2018-05-02 | Stop reason: HOSPADM

## 2018-05-02 RX ORDER — ACETAMINOPHEN 500 MG
1000 TABLET ORAL EVERY 4 HOURS PRN
Status: DISCONTINUED | OUTPATIENT
Start: 2018-05-02 | End: 2018-05-03 | Stop reason: HOSPADM

## 2018-05-02 RX ORDER — PREGABALIN 75 MG/1
150 CAPSULE ORAL 2 TIMES DAILY
Status: DISCONTINUED | OUTPATIENT
Start: 2018-05-02 | End: 2018-05-03 | Stop reason: HOSPADM

## 2018-05-02 RX ORDER — SUCRALFATE 1 G/1
1 TABLET ORAL 4 TIMES DAILY
Status: DISCONTINUED | OUTPATIENT
Start: 2018-05-02 | End: 2018-05-03 | Stop reason: HOSPADM

## 2018-05-02 RX ORDER — ALBUTEROL SULFATE 0.83 MG/ML
2.5 SOLUTION RESPIRATORY (INHALATION)
Status: DISCONTINUED | OUTPATIENT
Start: 2018-05-02 | End: 2018-05-03 | Stop reason: HOSPADM

## 2018-05-02 RX ORDER — LIDOCAINE HYDROCHLORIDE 10 MG/ML
30 INJECTION, SOLUTION EPIDURAL; INFILTRATION; INTRACAUDAL; PERINEURAL
Status: DISCONTINUED | OUTPATIENT
Start: 2018-05-02 | End: 2018-05-02 | Stop reason: HOSPADM

## 2018-05-02 RX ORDER — PROTAMINE SULFATE 10 MG/ML
1-5 INJECTION, SOLUTION INTRAVENOUS
Status: DISCONTINUED | OUTPATIENT
Start: 2018-05-02 | End: 2018-05-02 | Stop reason: HOSPADM

## 2018-05-02 RX ORDER — PANTOPRAZOLE SODIUM 40 MG/1
40 TABLET, DELAYED RELEASE ORAL DAILY
Status: DISCONTINUED | OUTPATIENT
Start: 2018-05-02 | End: 2018-05-03 | Stop reason: HOSPADM

## 2018-05-02 RX ORDER — HEPARIN SODIUM 1000 [USP'U]/ML
1000-10000 INJECTION, SOLUTION INTRAVENOUS; SUBCUTANEOUS EVERY 5 MIN PRN
Status: DISCONTINUED | OUTPATIENT
Start: 2018-05-02 | End: 2018-05-02 | Stop reason: HOSPADM

## 2018-05-02 RX ORDER — VERAPAMIL HYDROCHLORIDE 2.5 MG/ML
1-2.5 INJECTION, SOLUTION INTRAVENOUS
Status: DISCONTINUED | OUTPATIENT
Start: 2018-05-02 | End: 2018-05-02 | Stop reason: HOSPADM

## 2018-05-02 RX ORDER — NITROGLYCERIN 20 MG/100ML
.07-2 INJECTION INTRAVENOUS CONTINUOUS PRN
Status: DISCONTINUED | OUTPATIENT
Start: 2018-05-02 | End: 2018-05-02 | Stop reason: HOSPADM

## 2018-05-02 RX ORDER — HYDROCODONE BITARTRATE AND ACETAMINOPHEN 5; 325 MG/1; MG/1
1-2 TABLET ORAL EVERY 4 HOURS PRN
Status: DISCONTINUED | OUTPATIENT
Start: 2018-05-02 | End: 2018-05-03 | Stop reason: HOSPADM

## 2018-05-02 RX ORDER — NALOXONE HYDROCHLORIDE 0.4 MG/ML
.1-.4 INJECTION, SOLUTION INTRAMUSCULAR; INTRAVENOUS; SUBCUTANEOUS
Status: DISCONTINUED | OUTPATIENT
Start: 2018-05-02 | End: 2018-05-03 | Stop reason: HOSPADM

## 2018-05-02 RX ORDER — ESTRADIOL 1 MG/1
1 TABLET ORAL DAILY
Status: DISCONTINUED | OUTPATIENT
Start: 2018-05-02 | End: 2018-05-03 | Stop reason: HOSPADM

## 2018-05-02 RX ORDER — SODIUM CHLORIDE 9 MG/ML
INJECTION, SOLUTION INTRAVENOUS CONTINUOUS
Status: DISCONTINUED | OUTPATIENT
Start: 2018-05-02 | End: 2018-05-03 | Stop reason: CLARIF

## 2018-05-02 RX ORDER — HYDROCHLOROTHIAZIDE 12.5 MG/1
12.5 CAPSULE ORAL DAILY
Status: DISCONTINUED | OUTPATIENT
Start: 2018-05-02 | End: 2018-05-02

## 2018-05-02 RX ORDER — POTASSIUM CHLORIDE 29.8 MG/ML
20 INJECTION INTRAVENOUS
Status: DISCONTINUED | OUTPATIENT
Start: 2018-05-02 | End: 2018-05-03 | Stop reason: HOSPADM

## 2018-05-02 RX ORDER — CLOPIDOGREL 300 MG/1
300-600 TABLET, FILM COATED ORAL
Status: DISCONTINUED | OUTPATIENT
Start: 2018-05-02 | End: 2018-05-02 | Stop reason: HOSPADM

## 2018-05-02 RX ORDER — MORPHINE SULFATE 2 MG/ML
1-2 INJECTION, SOLUTION INTRAMUSCULAR; INTRAVENOUS EVERY 5 MIN PRN
Status: DISCONTINUED | OUTPATIENT
Start: 2018-05-02 | End: 2018-05-02 | Stop reason: HOSPADM

## 2018-05-02 RX ORDER — NALOXONE HYDROCHLORIDE 0.4 MG/ML
.2-.4 INJECTION, SOLUTION INTRAMUSCULAR; INTRAVENOUS; SUBCUTANEOUS
Status: ACTIVE | OUTPATIENT
Start: 2018-05-02 | End: 2018-05-03

## 2018-05-02 RX ORDER — ACETAMINOPHEN 325 MG/1
650 TABLET ORAL EVERY 4 HOURS PRN
Status: DISCONTINUED | OUTPATIENT
Start: 2018-05-02 | End: 2018-05-02

## 2018-05-02 RX ORDER — ACETAMINOPHEN 650 MG/1
650 SUPPOSITORY RECTAL EVERY 4 HOURS PRN
Status: DISCONTINUED | OUTPATIENT
Start: 2018-05-02 | End: 2018-05-03 | Stop reason: HOSPADM

## 2018-05-02 RX ORDER — LIDOCAINE HYDROCHLORIDE 10 MG/ML
1-10 INJECTION, SOLUTION EPIDURAL; INFILTRATION; INTRACAUDAL; PERINEURAL
Status: COMPLETED | OUTPATIENT
Start: 2018-05-02 | End: 2018-05-02

## 2018-05-02 RX ORDER — IOPAMIDOL 755 MG/ML
80 INJECTION, SOLUTION INTRAVASCULAR ONCE
Status: DISCONTINUED | OUTPATIENT
Start: 2018-05-02 | End: 2018-05-03 | Stop reason: HOSPADM

## 2018-05-02 RX ORDER — FLUTICASONE PROPIONATE 50 MCG
1 SPRAY, SUSPENSION (ML) NASAL DAILY
Status: DISCONTINUED | OUTPATIENT
Start: 2018-05-02 | End: 2018-05-03 | Stop reason: HOSPADM

## 2018-05-02 RX ORDER — CETIRIZINE HYDROCHLORIDE 10 MG/1
10 TABLET ORAL DAILY PRN
Status: DISCONTINUED | OUTPATIENT
Start: 2018-05-02 | End: 2018-05-03 | Stop reason: HOSPADM

## 2018-05-02 RX ORDER — DOPAMINE HYDROCHLORIDE 160 MG/100ML
2-20 INJECTION, SOLUTION INTRAVENOUS CONTINUOUS PRN
Status: DISCONTINUED | OUTPATIENT
Start: 2018-05-02 | End: 2018-05-02 | Stop reason: HOSPADM

## 2018-05-02 RX ORDER — MAGNESIUM SULFATE HEPTAHYDRATE 40 MG/ML
4 INJECTION, SOLUTION INTRAVENOUS EVERY 4 HOURS PRN
Status: DISCONTINUED | OUTPATIENT
Start: 2018-05-02 | End: 2018-05-03 | Stop reason: HOSPADM

## 2018-05-02 RX ORDER — ATROPINE SULFATE 0.1 MG/ML
.5-1 INJECTION INTRAVENOUS
Status: DISCONTINUED | OUTPATIENT
Start: 2018-05-02 | End: 2018-05-02 | Stop reason: HOSPADM

## 2018-05-02 RX ORDER — ENALAPRILAT 1.25 MG/ML
1.25-2.5 INJECTION INTRAVENOUS
Status: DISCONTINUED | OUTPATIENT
Start: 2018-05-02 | End: 2018-05-02 | Stop reason: HOSPADM

## 2018-05-02 RX ORDER — ASPIRIN 81 MG/1
81-324 TABLET, CHEWABLE ORAL
Status: DISCONTINUED | OUTPATIENT
Start: 2018-05-02 | End: 2018-05-02 | Stop reason: HOSPADM

## 2018-05-02 RX ORDER — METOPROLOL SUCCINATE 25 MG/1
25 TABLET, EXTENDED RELEASE ORAL DAILY
Status: DISCONTINUED | OUTPATIENT
Start: 2018-05-02 | End: 2018-05-03

## 2018-05-02 RX ORDER — NICOTINE 21 MG/24HR
1 PATCH, TRANSDERMAL 24 HOURS TRANSDERMAL DAILY
Status: DISCONTINUED | OUTPATIENT
Start: 2018-05-02 | End: 2018-05-03 | Stop reason: HOSPADM

## 2018-05-02 RX ORDER — ADENOSINE 3 MG/ML
12-12000 INJECTION, SOLUTION INTRAVENOUS
Status: DISCONTINUED | OUTPATIENT
Start: 2018-05-02 | End: 2018-05-02 | Stop reason: HOSPADM

## 2018-05-02 RX ORDER — DEXTROSE MONOHYDRATE 25 G/50ML
12.5-5 INJECTION, SOLUTION INTRAVENOUS EVERY 30 MIN PRN
Status: DISCONTINUED | OUTPATIENT
Start: 2018-05-02 | End: 2018-05-02 | Stop reason: HOSPADM

## 2018-05-02 RX ORDER — LEVETIRACETAM 500 MG/1
500 TABLET ORAL 2 TIMES DAILY
Status: DISCONTINUED | OUTPATIENT
Start: 2018-05-02 | End: 2018-05-03 | Stop reason: HOSPADM

## 2018-05-02 RX ORDER — FUROSEMIDE 10 MG/ML
20-100 INJECTION INTRAMUSCULAR; INTRAVENOUS
Status: DISCONTINUED | OUTPATIENT
Start: 2018-05-02 | End: 2018-05-02 | Stop reason: HOSPADM

## 2018-05-02 RX ORDER — NALOXONE HYDROCHLORIDE 0.4 MG/ML
0.4 INJECTION, SOLUTION INTRAMUSCULAR; INTRAVENOUS; SUBCUTANEOUS EVERY 5 MIN PRN
Status: DISCONTINUED | OUTPATIENT
Start: 2018-05-02 | End: 2018-05-02 | Stop reason: HOSPADM

## 2018-05-02 RX ORDER — POTASSIUM CHLORIDE 1500 MG/1
20-40 TABLET, EXTENDED RELEASE ORAL
Status: DISCONTINUED | OUTPATIENT
Start: 2018-05-02 | End: 2018-05-03 | Stop reason: HOSPADM

## 2018-05-02 RX ORDER — PROTAMINE SULFATE 10 MG/ML
25-100 INJECTION, SOLUTION INTRAVENOUS EVERY 5 MIN PRN
Status: DISCONTINUED | OUTPATIENT
Start: 2018-05-02 | End: 2018-05-02 | Stop reason: HOSPADM

## 2018-05-02 RX ORDER — FLUMAZENIL 0.1 MG/ML
0.2 INJECTION, SOLUTION INTRAVENOUS
Status: ACTIVE | OUTPATIENT
Start: 2018-05-02 | End: 2018-05-03

## 2018-05-02 RX ORDER — ALUMINA, MAGNESIA, AND SIMETHICONE 2400; 2400; 240 MG/30ML; MG/30ML; MG/30ML
30 SUSPENSION ORAL EVERY 4 HOURS PRN
Status: DISCONTINUED | OUTPATIENT
Start: 2018-05-02 | End: 2018-05-03 | Stop reason: HOSPADM

## 2018-05-02 RX ORDER — PHENYLEPHRINE HCL IN 0.9% NACL 1 MG/10 ML
20-100 SYRINGE (ML) INTRAVENOUS
Status: DISCONTINUED | OUTPATIENT
Start: 2018-05-02 | End: 2018-05-02 | Stop reason: HOSPADM

## 2018-05-02 RX ORDER — PROMETHAZINE HYDROCHLORIDE 25 MG/ML
6.25-25 INJECTION, SOLUTION INTRAMUSCULAR; INTRAVENOUS EVERY 4 HOURS PRN
Status: DISCONTINUED | OUTPATIENT
Start: 2018-05-02 | End: 2018-05-02 | Stop reason: HOSPADM

## 2018-05-02 RX ORDER — LORAZEPAM 2 MG/ML
0.5 INJECTION INTRAMUSCULAR
Status: DISCONTINUED | OUTPATIENT
Start: 2018-05-02 | End: 2018-05-02 | Stop reason: HOSPADM

## 2018-05-02 RX ORDER — RISPERIDONE 0.5 MG/1
0.5 TABLET ORAL AT BEDTIME
Status: DISCONTINUED | OUTPATIENT
Start: 2018-05-02 | End: 2018-05-03 | Stop reason: HOSPADM

## 2018-05-02 RX ORDER — LIDOCAINE 4 G/G
3 PATCH TOPICAL
Status: DISCONTINUED | OUTPATIENT
Start: 2018-05-02 | End: 2018-05-03 | Stop reason: HOSPADM

## 2018-05-02 RX ORDER — LIDOCAINE 40 MG/G
CREAM TOPICAL
Status: DISCONTINUED | OUTPATIENT
Start: 2018-05-02 | End: 2018-05-02 | Stop reason: HOSPADM

## 2018-05-02 RX ORDER — POLYETHYLENE GLYCOL 3350 17 G/17G
17 POWDER, FOR SOLUTION ORAL DAILY
Status: DISCONTINUED | OUTPATIENT
Start: 2018-05-02 | End: 2018-05-03 | Stop reason: HOSPADM

## 2018-05-02 RX ORDER — FENTANYL CITRATE 50 UG/ML
25-50 INJECTION, SOLUTION INTRAMUSCULAR; INTRAVENOUS
Status: ACTIVE | OUTPATIENT
Start: 2018-05-02 | End: 2018-05-03

## 2018-05-02 RX ORDER — ONDANSETRON 2 MG/ML
4 INJECTION INTRAMUSCULAR; INTRAVENOUS EVERY 4 HOURS PRN
Status: DISCONTINUED | OUTPATIENT
Start: 2018-05-02 | End: 2018-05-02 | Stop reason: HOSPADM

## 2018-05-02 RX ADMIN — LATANOPROST 1 DROP: 50 SOLUTION OPHTHALMIC at 21:06

## 2018-05-02 RX ADMIN — SODIUM CHLORIDE: 9 INJECTION, SOLUTION INTRAVENOUS at 17:28

## 2018-05-02 RX ADMIN — SUCRALFATE 1 G: 1 TABLET ORAL at 17:08

## 2018-05-02 RX ADMIN — BRIMONIDINE TARTRATE 1 DROP: 2 SOLUTION OPHTHALMIC at 21:06

## 2018-05-02 RX ADMIN — RISPERIDONE 0.5 MG: 0.5 TABLET ORAL at 23:31

## 2018-05-02 RX ADMIN — SODIUM CHLORIDE 83 ML: 9 INJECTION, SOLUTION INTRAVENOUS at 04:58

## 2018-05-02 RX ADMIN — DICLOFENAC SODIUM 2 G: 10 GEL TOPICAL at 17:24

## 2018-05-02 RX ADMIN — SUCRALFATE 1 G: 1 TABLET ORAL at 09:18

## 2018-05-02 RX ADMIN — UMECLIDINIUM 1 PUFF: 62.5 AEROSOL, POWDER ORAL at 09:17

## 2018-05-02 RX ADMIN — POTASSIUM CHLORIDE 40 MEQ: 1.5 POWDER, FOR SOLUTION ORAL at 04:24

## 2018-05-02 RX ADMIN — POLYETHYLENE GLYCOL 3350 17 G: 17 POWDER, FOR SOLUTION ORAL at 09:17

## 2018-05-02 RX ADMIN — ACETAMINOPHEN 1000 MG: 500 TABLET, FILM COATED ORAL at 17:08

## 2018-05-02 RX ADMIN — ALBUTEROL SULFATE 2.5 MG: 2.5 SOLUTION RESPIRATORY (INHALATION) at 21:47

## 2018-05-02 RX ADMIN — LIDOCAINE 3 PATCH: 560 PATCH PERCUTANEOUS; TOPICAL; TRANSDERMAL at 09:18

## 2018-05-02 RX ADMIN — ESCITALOPRAM 10 MG: 10 TABLET, FILM COATED ORAL at 09:18

## 2018-05-02 RX ADMIN — MORPHINE SULFATE 4 MG: 4 INJECTION, SOLUTION INTRAMUSCULAR; INTRAVENOUS at 03:59

## 2018-05-02 RX ADMIN — ATORVASTATIN CALCIUM 40 MG: 40 TABLET, FILM COATED ORAL at 09:18

## 2018-05-02 RX ADMIN — DICLOFENAC SODIUM 2 G: 10 GEL TOPICAL at 11:41

## 2018-05-02 RX ADMIN — NITROGLYCERIN 0.4 MG: 0.4 TABLET SUBLINGUAL at 10:14

## 2018-05-02 RX ADMIN — POTASSIUM CHLORIDE 20 MEQ: 1500 TABLET, EXTENDED RELEASE ORAL at 14:04

## 2018-05-02 RX ADMIN — OYSTER SHELL CALCIUM WITH VITAMIN D 1 TABLET: 500; 200 TABLET, FILM COATED ORAL at 17:08

## 2018-05-02 RX ADMIN — NITROGLYCERIN 200 MCG: 5 INJECTION, SOLUTION INTRAVENOUS at 15:02

## 2018-05-02 RX ADMIN — FENTANYL CITRATE 50 MCG: 50 INJECTION, SOLUTION INTRAMUSCULAR; INTRAVENOUS at 14:52

## 2018-05-02 RX ADMIN — MIDAZOLAM 2 MG: 1 INJECTION INTRAMUSCULAR; INTRAVENOUS at 14:52

## 2018-05-02 RX ADMIN — PANTOPRAZOLE SODIUM 40 MG: 40 TABLET, DELAYED RELEASE ORAL at 09:18

## 2018-05-02 RX ADMIN — ESTRADIOL 1 MG: 1 TABLET ORAL at 09:18

## 2018-05-02 RX ADMIN — PREGABALIN 150 MG: 75 CAPSULE ORAL at 21:02

## 2018-05-02 RX ADMIN — LEVETIRACETAM 500 MG: 500 TABLET, FILM COATED ORAL at 21:02

## 2018-05-02 RX ADMIN — NICOTINE 1 PATCH: 14 PATCH, EXTENDED RELEASE TRANSDERMAL at 17:23

## 2018-05-02 RX ADMIN — PHENYTOIN SODIUM 200 MG: 100 CAPSULE, EXTENDED RELEASE ORAL at 21:02

## 2018-05-02 RX ADMIN — LEVETIRACETAM 500 MG: 500 TABLET, FILM COATED ORAL at 09:18

## 2018-05-02 RX ADMIN — SUCRALFATE 1 G: 1 TABLET ORAL at 21:02

## 2018-05-02 RX ADMIN — DICLOFENAC SODIUM 2 G: 10 GEL TOPICAL at 21:10

## 2018-05-02 RX ADMIN — DICLOFENAC SODIUM 2 G: 10 GEL TOPICAL at 09:17

## 2018-05-02 RX ADMIN — PHENYTOIN SODIUM 200 MG: 100 CAPSULE, EXTENDED RELEASE ORAL at 09:18

## 2018-05-02 RX ADMIN — BRIMONIDINE TARTRATE 1 DROP: 2 SOLUTION OPHTHALMIC at 09:17

## 2018-05-02 RX ADMIN — MORPHINE SULFATE 4 MG: 4 INJECTION, SOLUTION INTRAMUSCULAR; INTRAVENOUS at 05:23

## 2018-05-02 RX ADMIN — PREGABALIN 150 MG: 75 CAPSULE ORAL at 09:18

## 2018-05-02 RX ADMIN — FLUTICASONE FUROATE AND VILANTEROL TRIFENATATE 1 PUFF: 200; 25 POWDER RESPIRATORY (INHALATION) at 09:17

## 2018-05-02 RX ADMIN — IOPAMIDOL 56 ML: 755 INJECTION, SOLUTION INTRAVENOUS at 04:58

## 2018-05-02 RX ADMIN — LISINOPRIL 10 MG: 10 TABLET ORAL at 09:18

## 2018-05-02 RX ADMIN — LIDOCAINE HYDROCHLORIDE 10 ML: 10 INJECTION, SOLUTION EPIDURAL; INFILTRATION; INTRACAUDAL; PERINEURAL at 14:52

## 2018-05-02 RX ADMIN — HYDROCHLOROTHIAZIDE 12.5 MG: 12.5 CAPSULE ORAL at 09:18

## 2018-05-02 ASSESSMENT — ENCOUNTER SYMPTOMS
SHORTNESS OF BREATH: 0
NAUSEA: 1

## 2018-05-02 NOTE — PLAN OF CARE
Problem: Patient Care Overview  Goal: Plan of Care/Patient Progress Review  Outcome: No Change  Pt arrived to Obs care about 0630; chest/back pain decreased with repositioning; pain meds pending pharmacy ok; pt alert and oriented.

## 2018-05-02 NOTE — ED PROVIDER NOTES
History     Chief Complaint:  Chest Pain       HPI   Sonya Foote is a 68 year old female with a complex past medical history including CAD, diastolic CHF, Diabetes, hyperlipidemia, hypertension, and CVA who presents from her assisted living facility via EMS with chest pain.  The patient states that she developed left sided chest pain while smoking approximately 4 hours ago. Initially, took some tums, but this did not resolve her pain.  She states that the pain does feel quite similar to when she had her prior MIs. The patient did take 3 nitroglycerin without improvement of her symptoms. She was given 324 mg of aspirin per EMS along with 4 mg of Zofran as she became nauseated during her transport to the ED. The patient denies any new cough. Additionally, the patient does report having some vaginal irritation and very scant bleeding. She did have her suprapubic catheter changed 2 weeks ago and states that it is draining well.  She reports that she had both male and female reproductive organs the time of birth and her parents decided to make her a female, so she has a prostate and the vagina but no uterus and only one ovary.  She has not been sexually active in any way for over 10 years.  She states she is legally blind and due to poor depth perception she excellently spilled some hot beverage on her thigh a number of days ago, for which she was previously evaluated.  No new discomfort there.  No fevers.  No new abdominal pain.      Lexiscan Stress test Results from 10/2/17:  1.  Myocardial perfusion imaging using single isotope technique  demonstrated a small, basal inferior nontransmural infarction and a  very small area of mild ischemia involving the distal inferolateral  wall and apex.   2. Gated images demonstrated mild basal inferior hypokinesis.  The  left ventricular systolic function is normal, with an ejection  fraction measured at 80%.  3. No previous study available for  comparison        Allergies:  Penicillins  Dilantin  Iodide  Novocaine  Tositumomab     Medications:    ACETAMINOPHEN PO  ADVAIR DISKUS 250-50 MCG/DOSE diskus inhaler  albuterol (2.5 MG/3ML) 0.083% neb solution  aspirin EC 81 MG EC tablet  atorvastatin (LIPITOR) 40 MG tablet  calcium citrate-vitamin D (CITRACAL) 315-250 MG-UNIT TABS  cetirizine (ZYRTEC) 10 MG tablet  Cholecalciferol (VITAMIN D) 2000 UNITS tablet  clopidogrel (PLAVIX) 75 MG tablet  CYANOCOBALAMIN PO  cyclobenzaprine (FLEXERIL) 10 MG tablet  doxycycline (VIBRA-TABS) 100 MG tablet  EPINEPHrine   escitalopram (LEXAPRO) 10 MG tablet  estradiol (ESTRACE) 1 MG tablet  ferrous sulfate (IRON) 325 (65 FE) MG tablet  fluticasone (FLONASE) 50 MCG/ACT nasal spray  guaiFENesin-codeine (ROBITUSSIN AC) 100-10 MG/5ML SOLN solution  hydrochlorothiazide (MICROZIDE) 12.5 MG capsule  HYDROMORPHONE HCL PO  lactulose (CHRONULAC) 10 GM/15ML solution  levETIRAcetam (KEPPRA) 500 MG tablet  lidocaine (LIDODERM) 5 % Patch  lisinopril (PRINIVIL/ZESTRIL) 10 MG tablet  metFORMIN (GLUCOPHAGE-XR) 500 MG 24 hr tablet  metoprolol (TOPROL-XL) 25 MG 24 hr tablet  nitroglycerin (NITROSTAT) 0.4 MG SL tablet  omeprazole (PRILOSEC) 20 MG CR capsule  ondansetron (ZOFRAN-ODT) 8 MG ODT tab  pantoprazole (PROTONIX) 40 MG EC tablet  phenytoin 200 MG CAPS  polyethylene glycol (MIRALAX/GLYCOLAX) Packet  pregabalin (LYRICA) 75 MG capsule  risperiDONE (RISPERDAL) 0.5 MG tablet  sucralfate (CARAFATE) 1 GM tablet  traZODone   umeclidinium (INCRUSE ELLIPTA) 62.5 MCG/INH oral inhaler  VENTOLIN  (90 BASE) MCG/ACT Inhaler  zinc 50 MG TABS     Past Medical History:    Antiplatelet or antithrombotic long-term use  Arthritis  AS (sickle cell trait)   Blind left eye  BPPV (benign paroxysmal positional vertigo)  Chronic back pain  COPD (chronic obstructive pulmonary disease)   Coronary artery disease  CVA (cerebral infarction)  Diastolic CHF   GERD (gastroesophageal reflux disease)  HTN  (hypertension)  Hyperlipidemia LDL   Legally blind in right eye, as defined in [place]  Osteoporosis  Other chronic pain  PAD (peripheral artery disease)   Person who has had sex change operation  Schizoaffective disorder   Seizure disorder   Thrombosis of leg  Type 2 diabetes, HbA1C goal < 8%   Uncomplicated asthma  Unspecified cerebral artery occlusion with cerebral infarction    Past Surgical History:    Amputate toes, right  Amputate left leg above knee   Amputate right leg below knee   Amputate revision stump lower extremity right x 2  Appendectomy   Breast surgery  Cholecystectomy   Cataract right  Endarterectomy femoral  Vascular surgery   Tonsillectomy   Sinus surgery   Sex transformation surgery, male to female  Wrist repair  Fasciotomy lower extremity, left    Family History:    Dementia-Mother  Glaucoma-Mother, Father  Diabetes-Mother, Father, Sister  CAD-Mother  Heart Failure-Father  Schizophrenia-Brother  Depression-Brother, Sister  Suicide-Sister  Breast Cancer-Sister  Cerebrovascular Disease-Brother    Social History:  Marital Status:   Presents to the ED alone  Tobacco Use: Former Smoker, quit 2001  Alcohol Use: No  PCP: Allan Casey      Review of Systems   Respiratory: Negative for shortness of breath.    Cardiovascular: Positive for chest pain.   Gastrointestinal: Positive for nausea.   Genitourinary: Positive for vaginal bleeding and vaginal pain.   All other systems reviewed and are negative.    Physical Exam   First Vitals:  BP: 101/59  Pulse: 75  Heart Rate: 68  Temp: 98.3  F (36.8  C)  Resp: 20  SpO2: 96 %      Physical Exam  General: chronically ill appearing woman sitting upright in room 25  HENT: mucous membranes moist  CV: regular rate, regular rhythm, no JVD, palpable symmetric radial pulses  Resp: clear throughout, normal effort, no crackles or wheezing  GI: abdomen soft and nontender, no guarding, negative Mcintyre's sign  Suprapubic catheter in place draining clear urine into  bag  : Pelvic exam performed with female chaperone present throughout reveals nontender fullness and prominence to the clitoral skaggs, perhaps congenital, as well as a fairly narrow vaginal introitus that would not allow passage of the speculum so this part of the exam was abandoned.  Basic digital exam is unremarkable.  External exam of the genitalia shows no obvious cause of pain or scant bleeding.  No apparent skin infection or signs of trauma.  MSK: no bony tenderness to chest, bilateral AKA  Skin: appropriately warm and dry, no erythema or vesicles to chest wall  Neuro: alert, clear speech, oriented   Psych: calm, cooperative      Emergency Department Course   ECG:  @ 0331  Indication: Chest Pain  Vent. Rate 75 bpm. RI interval 162 ms. QRS duration 90 ms. QT/QTc 418/466 ms. P-R-T axis 50 14 43.   Normal sinus rhythm. Moderate voltage criteria for LVH, may be normal variant. Abnormal ECG.   Read @ 0337 by Dr. Naranjo.     @ 0531  Indication: Chest Pain  Vent. Rate 63 bpm. RI interval 184 ms. QRS duration 96 ms. QT/QTc 446/456 ms. P-R-T axis 62 23 37.   Normal sinus rhythm. Normal ECG.    Read @ 0531 by Dr. Naranjo.     Imaging:  Chest x-ray, 2 views:   No evidence of active cardiopulmonary disease.   Preliminary radiology read.       CT Chest pulmonary embolism with contrast:   1. No visualized pulmonary embolus.  2. No convincing evidence of active pulmonary disease.  Preliminary radiology read.     Radiographic findings were communicated with the patient who voiced understanding of the findings.    Laboratory:  Wet Prep:  Rare PMNs seen. No Trichomonas seen.  No clue cells seen. No yeast seen.     CBC:  WBC 9.0, HGB 11.4 (L),   CMP: Potassium 2.9 (L), BUN 6 (L), Bilirubin 0.1 (L), Albumin 2.7 (L), Alkphos 228 (H), otherwise WNL (Creatinine 0.52)   Lipase; 130  (0330) Troponin I: <0.015  (0330) D dimer: 0.7 (H)     Interventions:  (0359) Morphine, 4 mg, IV injection   (0424) Potassium Chloride, 40  mEq, PO   (0523) Morphine, 4 mg, IV injection      Emergency Department Course:  Nursing notes and vitals reviewed.  (0323) I performed an exam of the patient as documented above.      EKG was done, interpretation as above.     A peripheral IV was established. Blood was drawn from the patient. This was sent for laboratory testing, findings above.      The patient was sent for a chest x-ray while in the emergency department, findings above.      The patient was placed on continuous cardiac and pulse ox monitoring.    (0427) I rechecked on the patient     EKG was done, interpretation as above.     (0522) I updated the patient     Findings and plan explained to the patient who consents to observation.   (0527) I discussed the patient with Dr. Smith of the hospitalist service, who will place the patient in observation in the observation  area.      Impression & Plan      Medical Decision Making:  Regarding her acute chest pain, ACS remains on the differential particularly given her known coronary artery disease. Of additional concern is that her last stress test in October was somewhat abnormal. It is unclear to me what the follow up plan had been from that. Serial EKGs here show no indication for immediate cath lab activation or definitive ischemic findings, though coronary angiography may be reasonable in the near future to more definitively evaluate her symptoms. She was treated with nitroglycerin and morphine with incomplete relief. PE was considered due to exogenous estrogen in particular though CT has now ruled that out as well as major aortic pathology. Gastritis and upper GI pathologies such as esophagitis and pancreatitis were also considered. No signs of shingles. I think she would benefit from hospitalization for close monitoring and consultation and further treatment. Regarding the vaginal irritation a cause remains unconfirmed. Her anatomy made a typical exam impossible. I do not think she requires  further emergent specific attention to those symptoms and she fully agrees.       Diagnosis:    ICD-10-CM    1. Acute chest pain R07.9 Wet prep     D dimer quantitative   2. History of coronary artery disease Z86.79    3. Abnormal cardiovascular stress test R94.39    4. Hypokalemia E87.6        Disposition:  Admitted to the hospitalist       ILucy, am serving as a scribe on 5/2/2018 at 3:23 AM to personally document services performed by Dr. Naranjo based on my observations and the provider's statements to me.    5/2/2018    EMERGENCY DEPARTMENT       Guillermo Naranjo MD  05/02/18 0558

## 2018-05-02 NOTE — IP AVS SNAPSHOT
MRN:[id number]                      After Visit Summary   5/2/2018    Sonya Foote    MRN: [id number]           Thank you!     Thank you for choosing [facility] for your care. Our goal is always to provide you with excellent care. Hearing back from our patients is one way we can continue to improve our services. Please take a few minutes to complete the written survey that you may receive in the mail after you visit with us. Thank you!        Patient Information     Date Of Birth          1950        About your hospital stay     You were admitted on:  May 2, 2018 [facility]    You were discharged on:  May 3, 2018        Reason for your hospital stay       You were registered to observation due to chest pain.                  Who to Call     For medical emergencies, please call 911.  For non-urgent questions about your medical care, please call your primary care provider or clinic, [phone]          Attending Provider     Provider Specialty    Guillermo Naranjo MD Emergency Medicine    Smith, Toni Redd MD Internal Medicine       Primary Care Provider Office Phone # Fax #    Allan Casey [phone]      After Care Instructions     Activity       Your activity upon discharge: activity as tolerated            Diet       Follow this diet upon discharge: Orders Placed This Encounter      Dysphagia Diet Level 3 Advanced Thin Liquids (water, ice chips, juice, milk gelatin, ice cream, etc)                  Follow-up Appointments     Follow-up and recommended labs and tests        Follow up with primary care provider, Allan Casey, within 7 days to evaluate medication change and for hospital follow- up.  No follow up labs or test are needed.    Followup with Cardiology on 5/17 at 1:30pm; with lab draw before (BMP).                  Your next 10 appointments already scheduled     May 04, 2018 10:20 AM CDT   (Arrive by 10:05  AM)   RETURN DIABETES with Michelle Irizarry MD   [facility] ([facility])    [address]  [address]rd [facility] [address]   [phone]            May 08, 2018 12:00 PM CDT   L/Vision Treatment with ULISES [facility] ([facility])    [address] [facility] [address]   [phone]            May 09, 2018 10:20 AM CDT   SHORT with Allan Casey MD   [facility] ([facility])    [address] [id number] [phone]            May 15, 2018 11:00 AM CDT   L/Vision Treatment with ULISES Colon Occupational Therapy ([facility])    [address] [facility] [address]   [phone]            May 17, 2018 12:40 PM CDT   LAB with REZA LAB   [facility] ([facility])    [facility] [address]   [phone]           Please do not eat 10-12 hours before your appointment if you are coming in fasting for labs on lipids, cholesterol, or glucose (sugar). This does not apply to pregnant women. Water, hot tea and black coffee (with nothing added) are okay. Do not drink other fluids, diet soda or chew gum.            May 17, 2018  1:30 PM CDT   Return Discharge with VICTOR M Castellano [facility] ([facility])    [address]  [facility] [address]   [phone] OPT 2            May 29, 2018 11:00 AM CDT   L/Vision Treatment with ULISES Colon Occupational Therapy ([facility])    [address] [facility] [address]   [phone]            Jun 04, 2018  9:30 AM CDT   (Arrive by 9:15 AM)   Return Visit with Alina Jennings MD   [facility] (  [facility])    [address]  [address]  [facility] [address]   [phone]            Jun 26, 2018 10:45 AM CDT   Return Visit with jB Anderson MD   [facility] ([facility])    [address]  [place] [address]   [phone] OPT 2            Jul 26, 2018 10:15 AM CDT   RETURN GLAUCOMA with Marely Robin MD   [facility] ([facility])    [address]  [address] [address]  [facility] [address] [phone]              Additional Services     Home care nursing referral       Resume previously ordered home RN for suprapubic catheter cares.    Your provider has ordered home care nursing services. If you have not been contacted within 2 days of your discharge please call the inpatient department phone number at [phone] .                  Future tests that were ordered for you     Basic metabolic panel                 Further instructions from your care team             Pending Results     No orders found for last 3 day(s).            Statement of Approval     Ordered          05/03/18 1142  I have reviewed and agree with all the recommendations and orders detailed in this document.  EFFECTIVE NOW     Approved and electronically signed by:  Chet Ley PA-C             Admission Information     Date & Time Provider Department Dept. Phone    5/2/2018 Toni Smith MD [facility] [phone]      Your Vitals Were     Blood Pressure Pulse Temperature Respirations Weight Pulse Oximetry    153/77 (BP Location: Right arm) 75 98.6  F (37  C) (Oral) 16 59.6 kg (131 lb 6.4 oz) 96%    BMI (Body Mass Index)                   20.58 kg/m2           MyChart Information     [facility] lets you send messages to your doctor, view your test results, renew your prescriptions, schedule appointments and more. To sign up, go to  [email or web address] . Click on \"Log in\" on the left side of the screen, which will take you to the Welcome page. Then click on \"Sign up Now\" on the right side of the page.     You will be asked to enter the access code listed below, as well as some personal information. Please follow the directions to create your username and password.     Your access code is: [email or web address]  Expires: 2018 11:08 AM     Your access code will  in 90 days. If you need help or a new code, please call your [facility] clinic or [phone].        Care EveryWhere ID     This is your Care EveryWhere ID. This could be used by other organizations to access your [facility] medical records  [id number]        Equal Access to Services     [facility] : John Cohen, reji glez, radha encarnacion, tonie marvin . [facility] [phone].    ATENCIÓN: Si habla español, tiene a briones disposición servicios gratuitos de asistencia lingüística. Meño al [phone].    We comply with applicable federal civil rights laws and [place] laws. We do not discriminate on the basis of race, color, national origin, age, disability, sex, sexual orientation, or gender identity.               Review of your medicines      START taking        Dose / Directions    nicotine 14 MG/24HR 24 hr patch   Commonly known as:  NICODERM CQ   Used for:  Tobacco dependence syndrome        Dose:  1 patch   Start taking on:  2018   Place 1 patch onto the skin daily   Quantity:  30 patch   Refills:  0       silver sulfADIAZINE 1 % cream   Commonly known as:  SILVADENE   Used for:  Burn        Apply topically 2 times daily   Quantity:  50 g   Refills:  0         CONTINUE these medicines which may have CHANGED, or have new prescriptions. If we are uncertain of the size of tablets/capsules you have at home, strength may be listed as something that might have changed.        Dose / Directions    lisinopril 20 MG " tablet   Commonly known as:  PRINIVIL/ZESTRIL   This may have changed:    - medication strength  - how much to take   Used for:  History of coronary artery disease        Dose:  20 mg   Start taking on:  5/4/2018   Take 1 tablet (20 mg) by mouth daily   Quantity:  90 tablet   Refills:  0       metoprolol succinate 50 MG 24 hr tablet   Commonly known as:  TOPROL-XL   This may have changed:  See the new instructions.   Used for:  History of coronary artery disease        Dose:  50 mg   Start taking on:  5/4/2018   Take 1 tablet (50 mg) by mouth daily   Quantity:  90 tablet   Refills:  0       Phenytoin Sodium Extended 200 MG Caps   This may have changed:  additional instructions   Used for:  Seizure disorder (H)        Dose:  200 mg   Take 200 mg by mouth 2 times daily   Quantity:  60 capsule   Refills:  0         CONTINUE these medicines which have NOT CHANGED        Dose / Directions    ACETAMINOPHEN PO   Notes to Patient:  anytime        Dose:  1000 mg   Take 1,000 mg by mouth every 4 hours as needed for fever Not to exceed 4000 mg/day   Refills:  0       ADVAIR DISKUS 250-50 MCG/DOSE diskus inhaler   Used for:  Acute bronchitis   Generic drug:  fluticasone-salmeterol        Dose:  1 puff   Inhale 1 puff into the lungs 2 times daily   Quantity:  60 Inhaler   Refills:  11       * albuterol (2.5 MG/3ML) 0.083% neb solution   Used for:  Chronic obstructive pulmonary disease, unspecified COPD type (H)        INHALE 1 VIAL VIA NEBULIZER EVERY 6 HOURS AS NEEDED   Quantity:  360 mL   Refills:  11       * VENTOLIN  (90 Base) MCG/ACT Inhaler   Used for:  Chronic obstructive pulmonary disease, unspecified COPD type (H)   Generic drug:  albuterol   Notes to Patient:  anytime        Dose:  2 puff   Inhale 2 puffs into the lungs every 6 hours as needed for shortness of breath / dyspnea or wheezing   Quantity:  3 Inhaler   Refills:  5       aspirin 81 MG EC tablet   Used for:  Unstable angina (H)        Dose:  81 mg    Take 1 tablet (81 mg) by mouth daily   Quantity:  90 tablet   Refills:  3       atorvastatin 40 MG tablet   Commonly known as:  LIPITOR   Used for:  Hyperlipidemia LDL goal <100        Dose:  40 mg   Take 1 tablet (40 mg) by mouth daily   Quantity:  90 tablet   Refills:  2       blood glucose monitoring lancets   Used for:  Type 2 diabetes mellitus with diabetic peripheral angiopathy without gangrene, without long-term current use of insulin (H)        Use to test blood sugar 3 times daily or as directed.   Quantity:  100 each   Refills:  PRN       blood glucose monitoring meter device kit   Used for:  Type 2 diabetes, HbA1C goal < 8% (H)        Use to test blood sugars 3 times daily or as directed.   Quantity:  1 kit   Refills:  0       blood glucose monitoring test strip   Commonly known as:  ONETOUCH ULTRA   Used for:  Type 2 diabetes mellitus with diabetic peripheral angiopathy without gangrene, without long-term current use of insulin (H)        Use to test blood sugars 3 times daily or as directed.   Quantity:  3 Box   Refills:  3       brimonidine 0.2 % ophthalmic solution   Commonly known as:  ALPHAGAN   Used for:  Primary open angle glaucoma of both eyes, severe stage        Dose:  1 drop   Place 1 drop into the right eye 2 times daily   Quantity:  1 Bottle   Refills:  11       calcium citrate-vitamin D 315-250 MG-UNIT Tabs per tablet   Commonly known as:  CITRACAL   Used for:  Osteoporosis        Dose:  2 tablet   Take 2 tablets by mouth daily   Quantity:  120 tablet   Refills:  5       cetirizine 10 MG tablet   Commonly known as:  zyrTEC   Used for:  Seasonal allergic rhinitis, unspecified allergic rhinitis trigger        Dose:  10 mg   Take 1 tablet (10 mg) by mouth daily as needed for allergies   Quantity:  90 tablet   Refills:  3       CYANOCOBALAMIN PO        Dose:  2000 mcg   Take 2,000 mcg by mouth daily   Refills:  0       diclofenac 1 % Gel topical gel   Commonly known as:  VOLTAREN   Used for:   Primary osteoarthritis of both hands        Dose:  2 g   Apply 2 g topically 4 times daily to hands   Quantity:  100 g   Refills:  11       dorzolamide 2 % ophthalmic solution   Commonly known as:  TRUSOPT   Used for:  Primary open angle glaucoma of both eyes, severe stage        Dose:  1 drop   Place 1 drop into the right eye 2 times daily   Quantity:  1 Bottle   Refills:  11       EPINEPHrine 0.3 MG/0.3ML injection 2-pack   Commonly known as:  EPIPEN/ADRENACLICK/or ANY BX GENERIC EQUIV   Used for:  Bee sting reaction, undetermined intent, subsequent encounter        Dose:  0.3 mg   Inject 0.3 mLs (0.3 mg) into the muscle as needed for anaphylaxis   Quantity:  0.6 mL   Refills:  1       escitalopram 10 MG tablet   Commonly known as:  LEXAPRO        Dose:  10 mg   Take 10 mg by mouth daily   Refills:  0       ESTRACE VAGINAL 0.1 MG/GM cream   Used for:  Atrophic vaginitis   Generic drug:  estradiol        USE DIME SIZE AMOUNT 3X A WEEK AT NIGHT   Quantity:  42.5 g   Refills:  11       estradiol 1 MG tablet   Commonly known as:  ESTRACE   Used for:  Person who has had sex change operation        Dose:  1 mg   Take 1 tablet (1 mg) by mouth daily   Quantity:  90 tablet   Refills:  3       ferrous sulfate 325 (65 Fe) MG tablet   Commonly known as:  IRON        Dose:  325 mg   Take 1 tablet (325 mg) by mouth 2 times daily With meals   Quantity:  60 tablet   Refills:  2       fluticasone 50 MCG/ACT spray   Commonly known as:  FLONASE        Dose:  1 spray   Spray 1 spray into both nostrils daily   Refills:  0       guaiFENesin-codeine 100-10 MG/5ML Soln solution   Commonly known as:  ROBITUSSIN AC   Used for:  Upper respiratory tract infection, unspecified type        Dose:  1 tsp.   Take 5 mLs by mouth every 4 hours as needed for cough   Quantity:  120 mL   Refills:  0       hydrochlorothiazide 12.5 MG capsule   Commonly known as:  MICROZIDE   Used for:  Essential hypertension with goal blood pressure less than 130/80         Dose:  12.5 mg   Take 1 capsule (12.5 mg) by mouth daily   Quantity:  90 capsule   Refills:  2       INCRUSE ELLIPTA 62.5 MCG/INH oral inhaler   Generic drug:  umeclidinium        Dose:  1 puff   Inhale 1 puff into the lungs daily   Refills:  0       lactulose 10 GM/15ML solution   Commonly known as:  CHRONULAC        Dose:  20 g   Take 20 g by mouth as needed for constipation   Refills:  0       latanoprost 0.005 % ophthalmic solution   Commonly known as:  XALATAN   Used for:  Primary open angle glaucoma of both eyes, severe stage        Dose:  1 drop   Place 1 drop into both eyes At Bedtime   Quantity:  2.5 mL   Refills:  11       levETIRAcetam 500 MG tablet   Commonly known as:  KEPPRA   Used for:  Nausea        Dose:  500 mg   Take 1 tablet (500 mg) by mouth 2 times daily   Quantity:  180 tablet   Refills:  1       lidocaine        Place onto the skin At Bedtime   Refills:  0       lidocaine 5 % Patch   Commonly known as:  LIDODERM   Used for:  Chronic pain syndrome, 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 (H)        APPLY 1 TO 3 PATCHES TO PAINFUL AREA AT ONCE FOR UP TO 12 HOURS WITHIN A 24 HOUR PERIOD REMOVE AFTER 12 HOURS   Quantity:  30 patch   Refills:  5       MEDICATION GIVEN BY INTRATHECAL PUMP - INSTRUCTION        continuous 2/8/18: Pump managed by [facility] in [place] [phone].  Conc: Bupivacaine 20 mg/mL and Fentanyl 2000 mcg/mL, morphine 2 mg/mL Continuous:  Fentanyl 796 mcg/day, Bupivacaine 7.96 mg/day, Morphine 0.796 mg/day  Boluses: Up to 7 boluses per 24-hr period of Bupivicaine 0.5994 mg and Fentanyl 59.9 mcg morphine 0.0599 mg. Pump Refill Date 02/28/18   Refills:  0       metFORMIN 500 MG 24 hr tablet   Commonly known as:  GLUCOPHAGE-XR   Used for:  Type 2 diabetes mellitus with diabetic peripheral angiopathy and gangrene, without long-term current use of insulin (H)        Dose:  500 mg   Take 1 tablet (500 mg) by mouth daily (with  dinner)   Quantity:  90 tablet   Refills:  3       MULTIVITAMIN PO        Dose:  1 tablet   Take 1 tablet by mouth daily   Refills:  0       nitroGLYcerin 0.4 MG sublingual tablet   Commonly known as:  NITROSTAT   Used for:  Chronic systolic congestive heart failure (H), Old myocardial infarction        Dose:  0.4 mg   Place 1 tablet (0.4 mg) under the tongue every 5 minutes as needed for chest pain if you are still having symptoms after 3 doses (15 minutes) call 911.   Quantity:  25 tablet   Refills:  1       ondansetron 8 MG ODT tab   Commonly known as:  ZOFRAN-ODT   Used for:  Other migraine without status migrainosus, not intractable        Dose:  8 mg   Take 1 tablet (8 mg) by mouth every 8 hours as needed   Quantity:  30 tablet   Refills:  5       order for DME   Used for:  Incontinence        Equipment being ordered: Depends briefs   Quantity:  1 Month   Refills:  11       order for DME   Used for:  CVA (cerebral infarction), HTN (hypertension)        Equipment being ordered: Power Wheelchair   Quantity:  1 Device   Refills:  0       order for DME   Used for:  Type 2 diabetes mellitus with other diabetic neurological complication        Equipment being ordered: Glucerna daily shakes with each meal   Quantity:  1 Box   Refills:  11       * order for DME   Used for:  Simple chronic bronchitis (H)        Equipment being ordered: Nebulizer and tubing supplies   Quantity:  1 Units   Refills:  0       * order for DME   Used for:  Chronic obstructive pulmonary disease, unspecified COPD type (H)        Equipment being ordered: CPAP supplies mask, hose, filters, cushion fax to [facility] at [phone] Disp: 10 DeviceRefills: prn Class: Local PrintStart: 2/10/2017   Quantity:  1 Device   Refills:  0       * order for DME   Used for:  COPD (chronic obstructive pulmonary disease) (H)        Equipment being ordered: CPAP supplies mask, hose, filters, cushion  fax to [facility] at [phone]   Quantity:  10  Device   Refills:  prn       * order for DME   Used for:  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 (H)        Hospital bed with side rails   Quantity:  1 Device   Refills:  0       * order for DME   Used for:  Status post bilateral above knee amputation (H)        Full electric hospital bed with half rails  Dx: T88849, I110, J449 Length of need: lifetime   Quantity:  1 Device   Refills:  0       * order for DME   Used for:  Status post bilateral above knee amputation (H)        Wheel Chair Cushion: 18 x 18 inch Roho cushion   Quantity:  1 Device   Refills:  0       order for DME   Used for:  Burn of skin        Equipment being ordered: bandage tape, prefer paper   Quantity:  1 Package   Refills:  0       pantoprazole 40 MG EC tablet   Commonly known as:  PROTONIX   Used for:  Gastroesophageal reflux disease without esophagitis        Dose:  40 mg   Take 1 tablet (40 mg) by mouth daily   Quantity:  30 tablet   Refills:  5       polyethylene glycol Packet   Commonly known as:  MIRALAX/GLYCOLAX        Dose:  17 g   Take 17 g by mouth daily Dissolved in water or juice   Refills:  0       pregabalin 75 MG capsule   Commonly known as:  LYRICA   Used for:  Pain in both upper extremities        Dose:  150 mg   Take 2 capsules (150 mg) by mouth 2 times daily   Quantity:  120 capsule   Refills:  5       PROCHLORPERAZINE MALEATE PO        Dose:  10 mg   Take 10 mg by mouth every 8 hours as needed for nausea or vomiting   Refills:  0       risperiDONE 0.5 MG tablet   Commonly known as:  risperDAL        Dose:  0.5 mg   Take 0.5 mg by mouth At Bedtime   Refills:  0       sucralfate 1 GM tablet   Commonly known as:  CARAFATE   Used for:  Adjustment disorder with depressed mood        Dose:  1 g   Take 1 tablet (1 g) by mouth 4 times daily May dissolve in 10 mL water is necessary. (Start upon completion of carafate suspension)   Quantity:  120 tablet   Refills:  11       traZODone 50 MG tablet   Commonly known  as:  DESYREL        Dose:  150 mg   Take 150 mg by mouth At Bedtime   Refills:  0       vitamin D 2000 units tablet        Dose:  2000 Units   Take 2,000 Units by mouth daily.   Quantity:  100 tablet   Refills:  3       zinc 50 MG Tabs        Dose:  1 tablet   Take 1 tablet by mouth daily   Refills:  0       * Notice:  This list has 8 medication(s) that are the same as other medications prescribed for you. Read the directions carefully, and ask your doctor or other care provider to review them with you.         Where to get your medicines      Some of these will need a paper prescription and others can be bought over the counter. Ask your nurse if you have questions.     Bring a paper prescription for each of these medications     lisinopril 20 MG tablet    metoprolol succinate 50 MG 24 hr tablet    nicotine 14 MG/24HR 24 hr patch    silver sulfADIAZINE 1 % cream                Protect others around you: Learn how to safely use, store and throw away your medicines at www.disposemymeds.org.             Medication List: This is a list of all your medications and when to take them. Check marks below indicate your daily home schedule. Keep this list as a reference.      Medications           Morning Afternoon Evening Bedtime As Needed    ACETAMINOPHEN PO   Take 1,000 mg by mouth every 4 hours as needed for fever Not to exceed 4000 mg/day   Last time this was given:  1,000 mg on 5/3/2018  8:29 AM   Notes to Patient:  anytime                                ADVAIR DISKUS 250-50 MCG/DOSE diskus inhaler   Inhale 1 puff into the lungs 2 times daily   Generic drug:  fluticasone-salmeterol                                * albuterol (2.5 MG/3ML) 0.083% neb solution   INHALE 1 VIAL VIA NEBULIZER EVERY 6 HOURS AS NEEDED   Last time this was given:  2.5 mg on 5/3/2018  3:51 PM   Next Dose Due:  951 pm prn                                * VENTOLIN  (90 Base) MCG/ACT Inhaler   Inhale 2 puffs into the lungs every 6 hours as  needed for shortness of breath / dyspnea or wheezing   Generic drug:  albuterol   Notes to Patient:  anytime                                aspirin 81 MG EC tablet   Take 1 tablet (81 mg) by mouth daily   Last time this was given:  81 mg on 5/3/2018  8:29 AM                                   atorvastatin 40 MG tablet   Commonly known as:  LIPITOR   Take 1 tablet (40 mg) by mouth daily   Last time this was given:  40 mg on 5/3/2018  8:30 AM                                   blood glucose monitoring lancets   Use to test blood sugar 3 times daily or as directed.                                blood glucose monitoring meter device kit   Use to test blood sugars 3 times daily or as directed.                                blood glucose monitoring test strip   Commonly known as:  ONETOUCH ULTRA   Use to test blood sugars 3 times daily or as directed.                                brimonidine 0.2 % ophthalmic solution   Commonly known as:  ALPHAGAN   Place 1 drop into the right eye 2 times daily   Last time this was given:  1 drop on 5/3/2018  8:48 AM                                calcium citrate-vitamin D 315-250 MG-UNIT Tabs per tablet   Commonly known as:  CITRACAL   Take 2 tablets by mouth daily                                cetirizine 10 MG tablet   Commonly known as:  zyrTEC   Take 1 tablet (10 mg) by mouth daily as needed for allergies                                CYANOCOBALAMIN PO   Take 2,000 mcg by mouth daily                                diclofenac 1 % Gel topical gel   Commonly known as:  VOLTAREN   Apply 2 g topically 4 times daily to hands   Last time this was given:  2 g on 5/3/2018  4:44 PM                                dorzolamide 2 % ophthalmic solution   Commonly known as:  TRUSOPT   Place 1 drop into the right eye 2 times daily                                EPINEPHrine 0.3 MG/0.3ML injection 2-pack   Commonly known as:  EPIPEN/ADRENACLICK/or ANY BX GENERIC EQUIV   Inject 0.3 mLs (0.3 mg) into  the muscle as needed for anaphylaxis                                escitalopram 10 MG tablet   Commonly known as:  LEXAPRO   Take 10 mg by mouth daily   Last time this was given:  10 mg on 5/3/2018  8:29 AM                                   ESTRACE VAGINAL 0.1 MG/GM cream   USE DIME SIZE AMOUNT 3X A WEEK AT NIGHT   Generic drug:  estradiol                                estradiol 1 MG tablet   Commonly known as:  ESTRACE   Take 1 tablet (1 mg) by mouth daily   Last time this was given:  1 mg on 5/3/2018  8:29 AM                                ferrous sulfate 325 (65 Fe) MG tablet   Commonly known as:  IRON   Take 1 tablet (325 mg) by mouth 2 times daily With meals                                fluticasone 50 MCG/ACT spray   Commonly known as:  FLONASE   Spray 1 spray into both nostrils daily   Last time this was given:  1 spray on 5/3/2018  8:50 AM                                   guaiFENesin-codeine 100-10 MG/5ML Soln solution   Commonly known as:  ROBITUSSIN AC   Take 5 mLs by mouth every 4 hours as needed for cough                                hydrochlorothiazide 12.5 MG capsule   Commonly known as:  MICROZIDE   Take 1 capsule (12.5 mg) by mouth daily   Last time this was given:  12.5 mg on 5/2/2018  9:18 AM                                INCRUSE ELLIPTA 62.5 MCG/INH oral inhaler   Inhale 1 puff into the lungs daily   Last time this was given:  1 puff on 5/3/2018  8:51 AM   Generic drug:  umeclidinium                                   lactulose 10 GM/15ML solution   Commonly known as:  CHRONULAC   Take 20 g by mouth as needed for constipation                                latanoprost 0.005 % ophthalmic solution   Commonly known as:  XALATAN   Place 1 drop into both eyes At Bedtime   Last time this was given:  1 drop on 5/2/2018  9:06 PM                                   levETIRAcetam 500 MG tablet   Commonly known as:  KEPPRA   Take 1 tablet (500 mg) by mouth 2 times daily   Last time this was given:  500  mg on 5/3/2018  8:29 AM                                   lidocaine   Place onto the skin At Bedtime   Last time this was given:  5/3/2018  4:45 PM                                   lidocaine 5 % Patch   Commonly known as:  LIDODERM   APPLY 1 TO 3 PATCHES TO PAINFUL AREA AT ONCE FOR UP TO 12 HOURS WITHIN A 24 HOUR PERIOD REMOVE AFTER 12 HOURS                                lisinopril 20 MG tablet   Commonly known as:  PRINIVIL/ZESTRIL   Take 1 tablet (20 mg) by mouth daily   Start taking on:  5/4/2018   Last time this was given:  10 mg on 5/3/2018 10:50 AM                                   MEDICATION GIVEN BY INTRATHECAL PUMP - INSTRUCTION   continuous 2/8/18: Pump managed by [facility] in [place] [phone].  Conc: Bupivacaine 20 mg/mL and Fentanyl 2000 mcg/mL, morphine 2 mg/mL Continuous:  Fentanyl 796 mcg/day, Bupivacaine 7.96 mg/day, Morphine 0.796 mg/day  Boluses: Up to 7 boluses per 24-hr period of Bupivicaine 0.5994 mg and Fentanyl 59.9 mcg morphine 0.0599 mg. Pump Refill Date 02/28/18   Last time this was given:  5/2/2018  8:18 AM                                metFORMIN 500 MG 24 hr tablet   Commonly known as:  GLUCOPHAGE-XR   Take 1 tablet (500 mg) by mouth daily (with dinner)                                metoprolol succinate 50 MG 24 hr tablet   Commonly known as:  TOPROL-XL   Take 1 tablet (50 mg) by mouth daily   Start taking on:  5/4/2018   Last time this was given:  25 mg on 5/3/2018 10:50 AM                                   MULTIVITAMIN PO   Take 1 tablet by mouth daily                                nicotine 14 MG/24HR 24 hr patch   Commonly known as:  NICODERM CQ   Place 1 patch onto the skin daily   Start taking on:  5/4/2018   Last time this was given:  1 patch on 5/3/2018  8:28 AM                                   nitroGLYcerin 0.4 MG sublingual tablet   Commonly known as:  NITROSTAT   Place 1 tablet (0.4 mg) under the tongue every 5 minutes as needed for chest  pain if you are still having symptoms after 3 doses (15 minutes) call 911.   Last time this was given:  0.4 mg on 5/2/2018 10:14 AM                                ondansetron 8 MG ODT tab   Commonly known as:  ZOFRAN-ODT   Take 1 tablet (8 mg) by mouth every 8 hours as needed   Last time this was given:  4 mg on 5/3/2018 10:50 AM                                order for DME   Equipment being ordered: Depends briefs                                order for DME   Equipment being ordered: Power Wheelchair                                order for DME   Equipment being ordered: Glucerna daily shakes with each meal                                * order for DME   Equipment being ordered: Nebulizer and tubing supplies                                * order for DME   Equipment being ordered: CPAP supplies mask, hose, filters, cushion fax to [facility] at [phone] Disp: 10 DeviceRefills: prn Class: Local PrintStart: 2/10/2017                                * order for DME   Equipment being ordered: CPAP supplies mask, hose, filters, cushion  fax to [facility] at [phone]                                * order for DME   Hospital bed with side rails                                * order for DME   Full electric hospital bed with half rails  Dx: [id number], I110, J449 Length of need: lifetime                                * order for DME   Wheel Chair Cushion: 18 x 18 inch Roho cushion                                order for DME   Equipment being ordered: bandage tape, prefer paper                                pantoprazole 40 MG EC tablet   Commonly known as:  PROTONIX   Take 1 tablet (40 mg) by mouth daily   Last time this was given:  40 mg on 5/3/2018  8:29 AM                                Phenytoin Sodium Extended 200 MG Caps   Take 200 mg by mouth 2 times daily   Last time this was given:  200 mg on 5/3/2018  8:29 AM                                polyethylene glycol Packet   Commonly known as:   MIRALAX/GLYCOLAX   Take 17 g by mouth daily Dissolved in water or juice   Last time this was given:  17 g on 5/2/2018  9:17 AM                                pregabalin 75 MG capsule   Commonly known as:  LYRICA   Take 2 capsules (150 mg) by mouth 2 times daily   Last time this was given:  150 mg on 5/3/2018  8:29 AM                                PROCHLORPERAZINE MALEATE PO   Take 10 mg by mouth every 8 hours as needed for nausea or vomiting                                risperiDONE 0.5 MG tablet   Commonly known as:  risperDAL   Take 0.5 mg by mouth At Bedtime   Last time this was given:  0.5 mg on 5/2/2018 11:31 PM                                   silver sulfADIAZINE 1 % cream   Commonly known as:  SILVADENE   Apply topically 2 times daily                                sucralfate 1 GM tablet   Commonly known as:  CARAFATE   Take 1 tablet (1 g) by mouth 4 times daily May dissolve in 10 mL water is necessary. (Start upon completion of carafate suspension)   Last time this was given:  1 g on 5/3/2018  4:44 PM                                   traZODone 50 MG tablet   Commonly known as:  DESYREL   Take 150 mg by mouth At Bedtime                                vitamin D 2000 units tablet   Take 2,000 Units by mouth daily.                                zinc 50 MG Tabs   Take 1 tablet by mouth daily                                * Notice:  This list has 8 medication(s) that are the same as other medications prescribed for you. Read the directions carefully, and ask your doctor or other care provider to review them with you.              More Information        *CHEST PAIN, NONCARDIAC    Based on your visit today, the exact cause of your chest pain is not certain. Your condition does not seem serious and your pain does not appear to be coming from your heart. However, sometimes the signs of a serious problem take more time to appear. Therefore, please watch for the warning signs listed below.  HOME CARE:  1. Rest  today and avoid strenuous activity.  2. Take any prescribed medicine as directed.  FOLLOW UP with your doctor in 1-3 days.   GET PROMPT MEDICAL ATTENTION if any of the following occur:    A change in the type of pain: if it feels different, becomes more severe, lasts longer, or begins to spread into your shoulder, arm, neck, jaw or back    Shortness of breath or increased pain with breathing    Cough with blood or dark colored sputum (phlegm)    Weakness, dizziness, or fainting    Fever over 101  F (38.3  C)    Swelling, pain or redness in one leg    2646-5507 [employer]. [address] [place] [id number]. All rights reserved. This information is not intended as a substitute for professional medical care. Always follow your healthcare professional's instructions.  This information has been modified by your health care provider with permission from the publisher.

## 2018-05-02 NOTE — PHARMACY-ADMISSION MEDICATION HISTORY
Admission medication history interview status for the 5/2/2018  admission is complete. See EPIC admission navigator for prior to admission medications     Medication history source reliability:Good    Actions taken by pharmacist (provider contacted, etc): got list from assisted living facility. Asked patient if pain pump was still in place (said it is) - did NOT call to verify dose on pump.      Additional medication history information not noted on PTA med list :None    Medication reconciliation/reorder completed by provider prior to medication history? Yes    Time spent in this activity: 20 mins    Prior to Admission medications    Medication Sig Last Dose Taking? Auth Provider   ACETAMINOPHEN PO Take 1,000 mg by mouth every 4 hours as needed for fever Not to exceed 4000 mg/day prn Yes Unknown, Entered By History   ADVAIR DISKUS 250-50 MCG/DOSE diskus inhaler Inhale 1 puff into the lungs 2 times daily  Yes Alina Jennings MD   albuterol (2.5 MG/3ML) 0.083% neb solution INHALE 1 VIAL VIA NEBULIZER EVERY 6 HOURS AS NEEDED prn Yes Allan Casey MD   aspirin EC 81 MG EC tablet Take 1 tablet (81 mg) by mouth daily  Yes Daria Mancilla MD   atorvastatin (LIPITOR) 40 MG tablet Take 1 tablet (40 mg) by mouth daily  Yes Allan Casey MD   brimonidine (ALPHAGAN) 0.2 % ophthalmic solution Place 1 drop into the right eye 2 times daily  Yes Marely Robin MD   calcium citrate-vitamin D (CITRACAL) 315-250 MG-UNIT TABS Take 2 tablets by mouth daily  Yes Allan Casey MD   cetirizine (ZYRTEC) 10 MG tablet Take 1 tablet (10 mg) by mouth daily as needed for allergies  Yes Allan aCsey MD   Cholecalciferol (VITAMIN D) 2000 UNITS tablet Take 2,000 Units by mouth daily.  Yes Reported, Patient   CYANOCOBALAMIN PO Take 2,000 mcg by mouth daily  Yes Reported, Patient   diclofenac (VOLTAREN) 1 % GEL topical gel Apply 2 g topically 4 times daily to hands  Yes Allan Casey MD   dorzolamide  (TRUSOPT) 2 % ophthalmic solution Place 1 drop into the right eye 2 times daily  Yes Marely Robin MD   escitalopram (LEXAPRO) 10 MG tablet Take 10 mg by mouth daily   Yes Reported, Patient   ESTRACE VAGINAL 0.1 MG/GM cream USE DIME SIZE AMOUNT 3X A WEEK AT NIGHT  Yes Elizabeth Oliver MD   estradiol (ESTRACE) 1 MG tablet Take 1 tablet (1 mg) by mouth daily  Yes Allan Casey MD   ferrous sulfate (IRON) 325 (65 FE) MG tablet Take 1 tablet (325 mg) by mouth 2 times daily With meals  Yes Allan Casey MD   fluticasone (FLONASE) 50 MCG/ACT nasal spray Spray 1 spray into both nostrils daily  Yes Reported, Patient   guaiFENesin-codeine (ROBITUSSIN AC) 100-10 MG/5ML SOLN solution Take 5 mLs by mouth every 4 hours as needed for cough prn Yes Allan Casey MD   hydrochlorothiazide (MICROZIDE) 12.5 MG capsule Take 1 capsule (12.5 mg) by mouth daily  Yes Allan Casey MD   lactulose (CHRONULAC) 10 GM/15ML solution Take 20 g by mouth as needed for constipation prn Yes Unknown, Entered By History   latanoprost (XALATAN) 0.005 % ophthalmic solution Place 1 drop into both eyes At Bedtime  Yes Marely Robin MD   levETIRAcetam (KEPPRA) 500 MG tablet Take 1 tablet (500 mg) by mouth 2 times daily  Yes Allan Casey MD   lidocaine (LIDODERM) 5 % Patch APPLY 1 TO 3 PATCHES TO PAINFUL AREA AT ONCE FOR UP TO 12 HOURS WITHIN A 24 HOUR PERIOD REMOVE AFTER 12 HOURS  Yes Allan Casey MD   lidocaine patch REMOVAL Place onto the skin At Bedtime  Yes Unknown, Entered By History   lisinopril (PRINIVIL/ZESTRIL) 10 MG tablet Take 1 tablet (10 mg) by mouth daily  Yes Allan Casey MD   MEDICATION GIVEN BY INTRATHECAL PUMP - INSTRUCTION continuous 2/8/18: Pump managed by [facility] in [place] [phone].   Conc: Bupivacaine 20 mg/mL and Fentanyl 2000 mcg/mL, morphine 2 mg/mL  Continuous:  Fentanyl 796 mcg/day, Bupivacaine 7.96 mg/day, Morphine 0.796 mg/day   Boluses: Up to 7  boluses per 24-hr period of Bupivicaine 0.5994 mg and Fentanyl 59.9 mcg morphine 0.0599 mg.  Pump Refill Date 02/28/18  Yes Unknown, Entered By History   metFORMIN (GLUCOPHAGE-XR) 500 MG 24 hr tablet Take 1 tablet (500 mg) by mouth daily (with dinner)  Yes Michelle Irizarry MD   metoprolol (TOPROL-XL) 25 MG 24 hr tablet TAKE 1 TABLET (25 MG) BY MOUTH DAILY  Yes Allan Casey MD   Multiple Vitamin (MULTIVITAMIN OR) Take 1 tablet by mouth daily   Yes Reported, Patient   nitroglycerin (NITROSTAT) 0.4 MG SL tablet Place 1 tablet (0.4 mg) under the tongue every 5 minutes as needed for chest pain if you are still having symptoms after 3 doses (15 minutes) call 911. prn Yes Allan Casey MD   ondansetron (ZOFRAN-ODT) 8 MG ODT tab Take 1 tablet (8 mg) by mouth every 8 hours as needed prn Yes Allan Casey MD   pantoprazole (PROTONIX) 40 MG EC tablet Take 1 tablet (40 mg) by mouth daily  Yes Allan Casey MD   phenytoin 200 MG CAPS Take 200 mg by mouth 2 times daily  Patient taking differently: Take 200 mg by mouth 2 times daily (takes at 8 AM and 8 PM)  Yes Noah Blum MD   polyethylene glycol (MIRALAX/GLYCOLAX) Packet Take 17 g by mouth daily Dissolved in water or juice   Yes Unknown, Entered By History   pregabalin (LYRICA) 75 MG capsule Take 2 capsules (150 mg) by mouth 2 times daily  Yes Allan Casey MD   PROCHLORPERAZINE MALEATE PO Take 10 mg by mouth every 8 hours as needed for nausea or vomiting prn Yes Unknown, Entered By History   risperiDONE (RISPERDAL) 0.5 MG tablet Take 0.5 mg by mouth At Bedtime  Yes Reported, Patient   sucralfate (CARAFATE) 1 GM tablet Take 1 tablet (1 g) by mouth 4 times daily May dissolve in 10 mL water is necessary. (Start upon completion of carafate suspension)  Yes Allan Casey MD   traZODone (DESYREL) 50 MG tablet Take 150 mg by mouth At Bedtime   Yes Reported, Patient   umeclidinium (INCRUSE ELLIPTA) 62.5 MCG/INH oral inhaler Inhale 1 puff into the lungs  daily  Yes Unknown, Entered By History   VENTOLIN  (90 BASE) MCG/ACT Inhaler Inhale 2 puffs into the lungs every 6 hours as needed for shortness of breath / dyspnea or wheezing  Yes Allan Casey MD   zinc 50 MG TABS Take 1 tablet by mouth daily  Yes Reported, Patient   blood glucose monitoring (ONE TOUCH ULTRA 2) meter device kit Use to test blood sugars 3 times daily or as directed.   Allan Casey MD   blood glucose monitoring (ONE TOUCH ULTRA) test strip Use to test blood sugars 3 times daily or as directed.   Allan Casey MD   blood glucose monitoring (ONE TOUCH ULTRASOFT) lancets Use to test blood sugar 3 times daily or as directed.   Allan Casey MD   EPINEPHrine (EPIPEN/ADRENACLICK/OR ANY BX GENERIC EQUIV) 0.3 MG/0.3ML injection 2-pack Inject 0.3 mLs (0.3 mg) into the muscle as needed for anaphylaxis   Allan Casey MD   ORDER FOR DME Equipment being ordered: Depends briefs   Allan Casey MD   ORDER FOR DME Equipment being ordered: Power Wheelchair   Allan Casey MD   order for DME Equipment being ordered: Glucerna daily shakes with each meal   Allan Casey MD   order for DME Equipment being ordered: Nebulizer and tubing supplies   Alina Jennings MD   order for DME Equipment being ordered: CPAP supplies mask, hose, filters, cushion    fax to [facility] at [phone]   Allan Casey MD   order for DME Equipment being ordered: CPAP supplies mask, hose, filters, cushion fax to [facility] at [phone]  Disp: 10 Device Refills: prn   Class: Local Print Start: 2/10/2017   Allan Casey MD   order for DME Hospital bed with side rails   Allan Casey MD   order for DME Full electric hospital bed with half rails    Dx: [id number], I110, J449  Length of need: lifetime   Allan Casey MD   order for DME Wheel Chair Cushion: 18 x 18 inch Roho cushion   Allan Casey MD   order for DME Equipment being ordered: bandage tape, prefer paper   Jacinto  MD Sandy Culp, PharmD

## 2018-05-02 NOTE — PLAN OF CARE
Problem: Patient Care Overview  Goal: Plan of Care/Patient Progress Review  Outcome: No Change  VSS except hypotensive after receiving 1 Nitro. Sleepy. Tele SR/SB. Reporting pain in chest down left arm. Pulls arm back when touched. Voltaren Cream applied to left arm. 3 negative troponins. Down at Cath Lab right now. Echo complete.

## 2018-05-02 NOTE — PLAN OF CARE
Problem: Patient Care Overview  Goal: Plan of Care/Patient Progress Review  Outcome: No Change  PRIMARY DIAGNOSIS: ACUTE PAIN  OUTPATIENT/OBSERVATION GOALS TO BE MET BEFORE DISCHARGE:  1. Pain Status: Improved-controlled with oral pain medications.    2. Return to near baseline physical activity: Yes    3. Cleared for discharge by consultants (if involved): No    Discharge Planner Nurse   Safe discharge environment identified: No  Barriers to discharge: Yes       Entered by: Nuris Sweet 05/02/2018 6:24 PM     Please review provider order for any additional goals.   Nurse to notify provider when observation goals have been met and patient is ready for discharge.    /76, chronic back pain, hand arthritic pain continued. Angiogram completed, right groin site soft, C/D/I. Shruthi diet. Adequate urine output.

## 2018-05-02 NOTE — H&P
[facility]    History and Physical  Hospitalist       Date of Admission:  5/2/2018    Assessment & Plan   Sonya Foote is a 68 year old female who presents with substernal/left-sided chest pain radiating to the left arm in the setting of extensive peripheral vascular disease including bilateral above-the-knee amputations, multiple strokes, history of coronary artery disease with RCA stenting, congestive heart failure who continues to smoke.     Chest pain, atypical: Onset of chest discomfort approximately 4:30 PM 5/1/18 and has persisted.  Initial troponin negative and EKG without overt evidence of ischemia.  Patient states that her chest discomfort is similar to that which she experienced with prior heart attacks including RCA stenting x2 in 2016.  Has a history of angina which typically is relieved with nitroglycerin.  Chest discomfort not responsive to nitroglycerin at this time.  Does have some reproducible chest discomfort, potentially related to coughing with chronic bronchitis, though patient states that this is not the same chest discomfort which brought her into the emergency department.  Patient does have a history of ischemic systolic congestive heart failure with most recent study demonstrating normalization of ejection fraction.  Last stress test was in October 2017 with small basal inferior nontransmural infarction and a very small area of mild ischemia involving distal inferior lateral wall and apex.   -Cardiology has been consulted regarding forward guidance with recurrent chest discomfort.  Uncertain if a repeat stress test will be of significant utility as patient is hesitant to ever undergo angiography again.  Request recommendations regarding further imaging/stress versus medical optimization including dual antiplatelet therapy.  Patient does have a history of GI bleed on Aggrenox, though tolerated aspirin and Plavix over the preceding year.  -Continue aspirin 81 mg daily  -Prior  to admission metoprolol 25 mg daily held pending cardiology consultation.  -Continue atorvastatin 40 mg daily  -Continue lisinopril 10 mg daily  -Tobacco cessation.  Patient counseled on the importance of tobacco cessation in the emergency department by myself.  -Continue troponin trend, cardiac telemetry    Tobacco abuse: Patient continues to smoke.  States that she is currently using a vaporizer, though still uses nicotine containing fluids.  -Lengthy discussion regarding importance of cessation in the setting of significant  vascular disease and presenting with chest pain complaints.    COPD:  -Continue prior to admission albuterol nebulizer treatments, incruse ellipta, fluticasone nasal spray   -tobacco cessation    Androgen insensitivity syndrome: Patient endorses some vaginal irritation and slight vaginal discharge.  Speculum exam attempted in the emergency department with some difficulty.   -Continue estradiol 1 mg daily  -Resume estrogen cream 3 times weekly at discharge  -Follow-up in gynecology clinic if symptoms persist     Hypertension:  -Continue lisinopril 10 mg daily  -Holding prior to admission metoprolol 25 mg daily pending plan for stress imaging  -Continue hydrochlorothiazide 12.5 mg daily  -Tobacco cessation encouraged    Chronic pain syndrome:  -Continue intrathecal pump.  Is on a pain contract with her primary care provider for any narcotic pain medications.  -Continue Lyrica 150 mg twice daily  -Tobacco cessation encouraged    History of carotid artery stenosis, multiple CVAs:  -Tobacco cessation  -Continues on aspirin 81 mg daily; patient states that her primary neurologist is aware of her medication decreased from dual antiplatelet therapy.  -Request most recent records from [facility]; if patient is not placed back on dual antiplatelet therapy, is there  a strong contraindication as to not increasing aspirin to 325 mg daily?    Type 2 diabetes:  -Low-dose sliding scale  insulin available as needed  -Continue Metformin 500 mg daily  -Tobacco cessation encouraged    Epilepsy: History of grand mal seizures since infancy, still with complex partial seizures.  Primary neurologist is Dr. Patterson  -Continue prior to admission phenytoin 200 mg twice daily  -Continue Keppra 500 mg twice daily    Schizoaffective disorder:  -Continue Risperdal 0.5 mg at bedtime  -Continue trazodone 150 mg at bedtime    Glaucoma: Patient is legally blind.  History of glaucoma as well as prior CVAs impairing visual fields  -Continue prior to admission eyedrops  -Tobacco cessation encouraged    Hypokalemia:  -Continue hydrochlorothiazide, patient received potassium supplementation in the emergency department  -Repeat BMP in a.m.  -Potassium and magnesium replacement protocols are in place    GERD: History of esophageal stricture and associated dysphasia.  Potentially contributing to chest discomfort as discomfort occurred after eating is  -Continue prior to admission Protonix    # Pain Assessment:  Current Pain Score 5/2/2018   Patient currently in pain? -   Pain score (0-10) 8   Pain location -   Pain descriptors -   - Sonya is experiencing pain due to unclear cause, potentially musculoskeletal chest pain in the setting of chronic pain syndrome.    DVT Prophylaxis: Pneumatic Compression Devices    Code Status: DNR / DNI    Disposition: Expected discharge likely 5/2/18 pending cardiology evaluation, possible stress test.    [facility]    Primary Care Physician   Allan Casey    Chief Complaint   Chest pain    History is obtained from the patient, chart review, discussion with Dr Naranjo in the emergency department    History of Present Illness   Sonya Foote is a 68 year old female who presents with chest pain.  Patient states that she began having left-sided chest discomfort at approximately 4:30 PM 5/1/18.  This occurred shortly after dinner while she was smoking.  Patient states that the discomfort  radiates into her left arm and is similar to her prior heart attacks.  Most recent coronary angiogram in 2016 when drug-eluting stent ×2 was placed in mid and proximal RCA.  Patient has since followed with Dr. Anderson of cardiology, underwent Lexiscan in October 2017 with small basal inferior nontransmural infarction and a very small area of mild ischemia involving distal inferior lateral wall and apex.  Given improvement from prior TTE demonstrating ischemic systolic congestive heart failure, patient was actually taken off of her Plavix and remains on aspirin 81 mg only at this time.  Patient does have a history of angina for which she takes nitroglycerin intermittently.  States that she took nitroglycerin 3 times over several hours, continued to have chest discomfort, so EMS was contacted.  Initial troponin in the emergency department was undetectable.  Patient continues to have 8/10 pain.    Patient with a history of COPD as well as bronchitis.  States that she was having a coughing spell overnight, has improved currently.  Endorses some yellow sputum.  No fevers or chills, no shortness of breath or increased work of breathing currently.    D-dimer in the emergency department was elevated at 0.7.  Patient underwent CT pulmonary angiogram negative for PE, no infiltrate noted. incidental pneumobilia.    Lengthy discussion regarding importance of t obacco cessation.    Past Medical History    I have reviewed this patient's medical history and updated it with pertinent information if needed.   Past Medical History:   Diagnosis Date     Anemia      Antiplatelet or antithrombotic long-term use      Arthritis      AS (sickle cell trait) (H) 10/8/2013     Blind left eye      BPPV (benign paroxysmal positional vertigo)      Chronic back pain      COPD (chronic obstructive pulmonary disease) (H)      Coronary artery disease      CVA (cerebral infarction) 10/8/2012    x3, residual left sided weakness     Diastolic CHF (H)  9/4/2012     Dilantin toxicity 5/31/2013     Esophageal candidiasis (H)      GERD (gastroesophageal reflux disease)      Heart attack      Hernia, abdominal      History of blood transfusion     x5     History of thrombophlebitis      HTN (hypertension) 9/4/2012     Hyperlipidemia LDL goal <100 9/4/2012     Legally blind in right eye, as defined in [place]     No periphal vision right eye     Osteoporosis 1/21/2013     Other chronic pain      PAD (peripheral artery disease) (H)      Palpitations      Person who has had sex change operation 1/20/2014     Pneumonia      Schizoaffective disorder (H)      Seizure disorder (H) 9/4/2012     Sleep apnea     CPAP     Thrombosis of leg      Type 2 diabetes, HbA1C goal < 8% (H) 9/4/2012     Uncomplicated asthma      Unspecified cerebral artery occlusion with cerebral infarction       Past Surgical History   I have reviewed this patient's surgical history and updated it with pertinent information if needed.  Past Surgical History:   Procedure Laterality Date     AMPUTATE LEG ABOVE KNEE Left 6/11/2016    Procedure: AMPUTATE LEG ABOVE KNEE;  Surgeon: Mello Rodriguez MD;  [facility]     AMPUTATE LEG BELOW KNEE Right 11/7/2016    Procedure: AMPUTATE LEG BELOW KNEE;  Surgeon: Savannah Durant MD;  [facility]     AMPUTATE REVISION STUMP LOWER EXTREMITY Right 11/11/2016    Procedure: AMPUTATE REVISION STUMP LOWER EXTREMITY;  Surgeon: Savannah Durant MD;  [facility]     AMPUTATE REVISION STUMP LOWER EXTREMITY Right 11/16/2016    Procedure: AMPUTATE REVISION STUMP LOWER EXTREMITY;  Surgeon: Savannah Durant MD;  [facility]     AMPUTATE TOE(S) Right 1/5/2016    Procedure: AMPUTATE TOE(S);  Surgeon: Mello Gaines DPM;  [facility]     ANGIOGRAM Bilateral 11/21/2014    Procedure: ANGIOGRAM;  Surgeon: Savannah Durant MD;  [facility]     ANGIOGRAM Left 1/16/2015    Procedure: ANGIOGRAM;  Surgeon: Savannah Durant MD;  [facility]     ANGIOGRAM Bilateral 9/14/2015     Procedure: ANGIOGRAM;  Surgeon: Savannah Durant MD;  [facility]     ANGIOGRAM Left 10/12/2015    Procedure: ANGIOGRAM;  Surgeon: Savannah Durant MD;  [facility]     ANGIOGRAM Right 6/6/2016    Procedure: ANGIOGRAM;  Surgeon: Savannah Durant MD;  [facility]     ANGIOPLASTY Right 6/6/2016    Procedure: ANGIOPLASTY;  Surgeon: Savannah Durant MD;  [facility]     APPENDECTOMY       BREAST SURGERY      right breast bx (benign)     CATARACT IOL, RT/LT Right      CHOLECYSTECTOMY       COLONOSCOPY N/A 8/25/2014    Procedure: COLONOSCOPY;  Surgeon: Mello Ferrer MD;  [facility]     COLONOSCOPY WITH CO2 INSUFFLATION N/A 8/20/2014    Procedure: COLONOSCOPY WITH CO2 INSUFFLATION;  Surgeon: Duane, William Charles, MD;  [facility]     CYSTOSTOMY, INSERT TUBE SUPRAPUBIC, COMBINED N/A 1/16/2018    Procedure: COMBINED CYSTOSTOMY, INSERT TUBE SUPRAPUBIC;  Cystoscopy, Intraoperative Ultrasound, Suprapubic Tube Placement;  Surgeon: Keanu Dawson MD;  [facility]     ENDARTERECTOMY FEMORAL  5/23/2014    Procedure: ENDARTERECTOMY FEMORAL;  Surgeon: Jason Joshi MD;  [facility]     ESOPHAGOSCOPY, GASTROSCOPY, DUODENOSCOPY (EGD), COMBINED  12/14/2012    Procedure: COMBINED ESOPHAGOSCOPY, GASTROSCOPY, DUODENOSCOPY (EGD), BIOPSY SINGLE OR MULTIPLE;  ESOPHAGOSCOPY, GASTROSCOPY, DUODENOSCOPY (EGD), DILATATION ;  Surgeon: Elizabeth Stevenson MD;  Location:  GI     ESOPHAGOSCOPY, GASTROSCOPY, DUODENOSCOPY (EGD), COMBINED  12/31/2013    Procedure: COMBINED ESOPHAGOSCOPY, GASTROSCOPY, DUODENOSCOPY (EGD), BIOPSY SINGLE OR MULTIPLE;;  Surgeon: Clemente Lopez MD;  [facility]     ESOPHAGOSCOPY, GASTROSCOPY, DUODENOSCOPY (EGD), COMBINED  4/1/2014    Procedure: COMBINED ESOPHAGOSCOPY, GASTROSCOPY, DUODENOSCOPY (EGD);;  Surgeon: Clemente Lopez MD;  [facility]     ESOPHAGOSCOPY, GASTROSCOPY, DUODENOSCOPY (EGD), COMBINED  6/28/2014    Procedure: COMBINED ESOPHAGOSCOPY, GASTROSCOPY,  DUODENOSCOPY (EGD);  Surgeon: Clemente Lopez MD;  [facility]     ESOPHAGOSCOPY, GASTROSCOPY, DUODENOSCOPY (EGD), COMBINED N/A 8/20/2014    Procedure: COMBINED ESOPHAGOSCOPY, GASTROSCOPY, DUODENOSCOPY (EGD), BIOPSY SINGLE OR MULTIPLE;  Surgeon: Duane, William Charles, MD;  [facility]     ESOPHAGOSCOPY, GASTROSCOPY, DUODENOSCOPY (EGD), COMBINED N/A 8/22/2014    Procedure: COMBINED ESOPHAGOSCOPY, GASTROSCOPY, DUODENOSCOPY (EGD), BIOPSY SINGLE OR MULTIPLE;  Surgeon: Mello Ferrer MD;  [facility]     ESOPHAGOSCOPY, GASTROSCOPY, DUODENOSCOPY (EGD), COMBINED N/A 10/2/2014    Procedure: COMBINED ESOPHAGOSCOPY, GASTROSCOPY, DUODENOSCOPY (EGD), BIOPSY SINGLE OR MULTIPLE;  Surgeon: Remy Haskins MD;  [facility]     ESOPHAGOSCOPY, GASTROSCOPY, DUODENOSCOPY (EGD), COMBINED Left 12/15/2014    Procedure: COMBINED ESOPHAGOSCOPY, GASTROSCOPY, DUODENOSCOPY (EGD), BIOPSY SINGLE OR MULTIPLE;  Surgeon: Remy Haskins MD;  [facility]     ESOPHAGOSCOPY, GASTROSCOPY, DUODENOSCOPY (EGD), COMBINED N/A 2/25/2015    Procedure: COMBINED ENDOSCOPIC ULTRASOUND, ESOPHAGOSCOPY, GASTROSCOPY, DUODENOSCOPY (EGD), FINE NEEDLE ASPIRATE/BIOPSY;  Surgeon: Clemente Lugo MD;  [facility]     ESOPHAGOSCOPY, GASTROSCOPY, DUODENOSCOPY (EGD), COMBINED Left 2/25/2015    Procedure: COMBINED ESOPHAGOSCOPY, GASTROSCOPY, DUODENOSCOPY (EGD), BIOPSY SINGLE OR MULTIPLE;  Surgeon: Clemente Lugo MD;  [facility]     ESOPHAGOSCOPY, GASTROSCOPY, DUODENOSCOPY (EGD), COMBINED N/A 9/25/2016    Procedure: COMBINED ESOPHAGOSCOPY, GASTROSCOPY, DUODENOSCOPY (EGD);  Surgeon: Aziza Patiño MD;  [facility]     ESOPHAGOSCOPY, GASTROSCOPY, DUODENOSCOPY (EGD), COMBINED N/A 1/18/2017    Procedure: COMBINED ESOPHAGOSCOPY, GASTROSCOPY, DUODENOSCOPY (EGD), BIOPSY SINGLE OR MULTIPLE;  Surgeon: Clemente Lopez MD;  Location:  [facility]     ESOPHAGOSCOPY, GASTROSCOPY, DUODENOSCOPY (EGD), COMBINED N/A 11/26/2017    Procedure:  COMBINED ESOPHAGOSCOPY, GASTROSCOPY, DUODENOSCOPY (EGD), REMOVE FOREIGN BODY;  Esophagogastroduodenoscopy with foreign body extraction  ;  Surgeon: Herberth Castrejon MD;  [facility]     ESOPHAGOSCOPY, GASTROSCOPY, DUODENOSCOPY (EGD), COMBINED N/A 11/26/2017    Procedure: COMBINED ESOPHAGOSCOPY, GASTROSCOPY, DUODENOSCOPY (EGD), REMOVE FOREIGN BODY;;  Surgeon: Herberth Castrejon MD;  [facility]     FASCIOTOMY LOWER EXTREMITY Left 6/10/2016    Procedure: FASCIOTOMY LOWER EXTREMITY;  Surgeon: Mello Rodriguez MD;  [facility]     HC CAPSULE ENDOSCOPY N/A 8/25/2014    Procedure: CAPSULE/PILL CAM ENDOSCOPY;  Surgeon: Remy Haskins MD;  [facility]     HC CAPSULE ENDOSCOPY N/A 10/2/2014    Procedure: CAPSULE/PILL CAM ENDOSCOPY;  Surgeon: Remy Haskins MD;  [facility]     ORTHOPEDIC SURGERY      broken wrist repair     SEX TRANSFORMATION SURGERY, MALE TO FEMALE      1974     SINUS SURGERY      cyst removed     TONSILLECTOMY       VASCULAR SURGERY      Left carotid stent     Prior to Admission Medications   Prior to Admission Medications   Prescriptions Last Dose Informant Patient Reported? Taking?   ACETAMINOPHEN PO  Nursing Home Yes No   Sig: Take 1,000 mg by mouth every 4 hours as needed for fever Not to exceed 4000 mg/day   ADVAIR DISKUS 250-50 MCG/DOSE diskus inhaler   No No   Sig: Inhale 1 puff into the lungs 2 times daily   CYANOCOBALAMIN PO  Nursing Home Yes No   Sig: Take 2,000 mcg by mouth daily   Cholecalciferol (VITAMIN D) 2000 UNITS tablet  Nursing Home Yes No   Sig: Take 2,000 Units by mouth daily.   EPINEPHrine (EPIPEN JR) 0.15 MG/0.3ML injection  Nursing Home No No   Sig: Inject 0.3 mLs (0.15 mg) into the muscle as needed for anaphylaxis   EPINEPHrine (EPIPEN/ADRENACLICK/OR ANY BX GENERIC EQUIV) 0.3 MG/0.3ML injection 2-pack   No No   Sig: Inject 0.3 mLs (0.3 mg) into the muscle as needed for anaphylaxis   ESTRACE VAGINAL 0.1 MG/GM cream  Nursing Home No No    Sig: USE DIME SIZE AMOUNT 3X A WEEK AT NIGHT   HYDROMORPHONE HCL PO  Nursing Home Yes No   Sig: Take 2 mg by mouth every 3 hours as needed for moderate to severe pain   MEDICATION GIVEN BY INTRATHECAL PUMP - INSTRUCTION  Nursing Home Yes No   Sig: continuous 2/8/18: Pump managed by [facility] in [place] [phone].   Conc: Bupivacaine 20 mg/mL and Fentanyl 2000 mcg/mL, morphine 2 mg/mL  Continuous:  Fentanyl 796 mcg/day, Bupivacaine 7.96 mg/day, Morphine 0.796 mg/day   Boluses: Up to 7 boluses per 24-hr period of Bupivicaine 0.5994 mg and Fentanyl 59.9 mcg morphine 0.0599 mg.  Pump Refill Date 02/28/18   Multiple Vitamin (MULTIVITAMIN OR)  Nursing Home Yes No   Sig: Take 1 tablet by mouth daily    ORDER FOR DME  Nursing Home No No   Sig: Equipment being ordered: Depends briefs   ORDER FOR DME  Nursing Home No No   Sig: Equipment being ordered: Power Wheelchair   VENTOLIN  (90 BASE) MCG/ACT Inhaler   No No   Sig: Inhale 2 puffs into the lungs every 6 hours as needed for shortness of breath / dyspnea or wheezing   albuterol (2.5 MG/3ML) 0.083% neb solution  Nursing Home No No   Sig: INHALE 1 VIAL VIA NEBULIZER EVERY 6 HOURS AS NEEDED   aspirin EC 81 MG EC tablet  Nursing Home No No   Sig: Take 1 tablet (81 mg) by mouth daily   atorvastatin (LIPITOR) 40 MG tablet   No No   Sig: Take 1 tablet (40 mg) by mouth daily   blood glucose monitoring (ONE TOUCH ULTRA 2) meter device kit  Nursing Home No No   Sig: Use to test blood sugars 3 times daily or as directed.   blood glucose monitoring (ONE TOUCH ULTRA) test strip  Nursing Home No No   Sig: Use to test blood sugars 3 times daily or as directed.   blood glucose monitoring (ONE TOUCH ULTRASOFT) lancets  Nursing Home No No   Sig: Use to test blood sugar 3 times daily or as directed.   brimonidine (ALPHAGAN) 0.2 % ophthalmic solution   No No   Sig: Place 1 drop into the right eye 2 times daily   calcium citrate-vitamin D (CITRACAL)  315-250 MG-UNIT TABS  Nursing Home No No   Sig: Take 2 tablets by mouth daily   cetirizine (ZYRTEC) 10 MG tablet  Nursing Home No No   Sig: Take 1 tablet (10 mg) by mouth daily as needed for allergies   clopidogrel (PLAVIX) 75 MG tablet   Yes No   cyclobenzaprine (FLEXERIL) 10 MG tablet   Yes No   diclofenac (VOLTAREN) 1 % GEL topical gel  Nursing Home No No   Sig: Apply 2 g topically 4 times daily to hands   dorzolamide (TRUSOPT) 2 % ophthalmic solution   No No   Sig: Place 1 drop into the right eye 2 times daily   dorzolamide-timolol (COSOPT) 2-0.5 % ophthalmic solution   No No   Sig: Place 1 drop into the right eye 2 times daily   doxycycline (VIBRA-TABS) 100 MG tablet   No No   Sig: Take 1 tablet (100 mg) by mouth 2 times daily   escitalopram (LEXAPRO) 10 MG tablet  Nursing Home Yes No   Sig: Take 10 mg by mouth daily    estradiol (ESTRACE) 1 MG tablet  Nursing Home No No   Sig: Take 1 tablet (1 mg) by mouth daily   ferrous sulfate (IRON) 325 (65 FE) MG tablet  Nursing Home No No   Sig: Take 1 tablet (325 mg) by mouth 2 times daily With meals   fluticasone (FLONASE) 50 MCG/ACT nasal spray  Nursing Home Yes No   Sig: Spray 1 spray into both nostrils daily   guaiFENesin-codeine (ROBITUSSIN AC) 100-10 MG/5ML SOLN solution   No No   Sig: Take 5 mLs by mouth every 4 hours as needed for cough   hydrochlorothiazide (MICROZIDE) 12.5 MG capsule   No No   Sig: Take 1 capsule (12.5 mg) by mouth daily   lactulose (CHRONULAC) 10 GM/15ML solution  Nursing Home Yes No   Sig: Take 20 g by mouth as needed for constipation   latanoprost (XALATAN) 0.005 % ophthalmic solution   No No   Sig: Place 1 drop into both eyes At Bedtime   levETIRAcetam (KEPPRA) 500 MG tablet   No No   Sig: Take 1 tablet (500 mg) by mouth 2 times daily   lidocaine (LIDODERM) 5 % Patch   No No   Sig: APPLY 1 TO 3 PATCHES TO PAINFUL AREA AT ONCE FOR UP TO 12 HOURS WITHIN A 24 HOUR PERIOD REMOVE AFTER 12 HOURS   lisinopril (PRINIVIL/ZESTRIL) 10 MG tablet   No  No   Sig: Take 1 tablet (10 mg) by mouth daily   metFORMIN (GLUCOPHAGE-XR) 500 MG 24 hr tablet  Nursing Home No No   Sig: Take 1 tablet (500 mg) by mouth daily (with dinner)   metoprolol (TOPROL-XL) 25 MG 24 hr tablet  Nursing Home No No   Sig: TAKE 1 TABLET (25 MG) BY MOUTH DAILY   nitroglycerin (NITROSTAT) 0.4 MG SL tablet  Nursing Home No No   Sig: Place 1 tablet (0.4 mg) under the tongue every 5 minutes as needed for chest pain if you are still having symptoms after 3 doses (15 minutes) call 911.   omeprazole (PRILOSEC) 20 MG CR capsule   Yes No   ondansetron (ZOFRAN-ODT) 8 MG ODT tab   No No   Sig: Take 1 tablet (8 mg) by mouth every 8 hours as needed   [facility] [place] [facility]  Nursing Home No No   Sig: Equipment being ordered: Glucerna daily shakes with each meal   [facility] [place] [facility]  Nursing [place] No No   Sig: Equipment being ordered: Nebulizer and tubing supplies   order for [facility] No No   Sig: Equipment being ordered: CPAP supplies mask, hose, filters, cushion    fax to [facility] at [phone]   order for [facility] No No   Sig: Equipment being ordered: CPAP supplies mask, hose, filters, cushion fax to [facility] at [phone]  Disp: 10 Device Refills: prn   Class: Local Print Start: 2/10/2017   order for [facility] [place] [facility]  Nursing Home No No   Sig: Hospital bed with side rails   [facility] [place] [facility]  Nursing Home No No   Sig: Full electric hospital bed with half rails    Dx: I43911, I110, J449  Length of need: lifetime   order for [facility] [place] [facility]  Nursing Home No No   Sig: Wheel Chair Cushion: 18 x 18 inch Roho cushion   [facility] for [facility] [place] [facility]   No No   Sig: Equipment being ordered: bandage tape, prefer paper   pantoprazole (PROTONIX) 40 MG EC tablet   No No   Sig: Take 1 tablet (40 mg) by mouth daily   phenytoin 200 MG CAPS  Nursing Home No No   Sig: Take 200 mg by mouth 2 times daily   Patient taking differently: Take 200 mg by mouth 2 times daily (takes at 8 AM and 8 PM)   polyethylene glycol (MIRALAX/GLYCOLAX) Packet  Nursing  Home Yes No   Sig: Take 17 g by mouth daily Dissolved in water or juice    pregabalin (LYRICA) 75 MG capsule   No No   Sig: Take 2 capsules (150 mg) by mouth 2 times daily   risperiDONE (RISPERDAL) 0.5 MG tablet  Nursing Home Yes No   Sig: Take 0.5 mg by mouth At Bedtime   sucralfate (CARAFATE) 1 GM tablet  Nursing Home No No   Sig: Take 1 tablet (1 g) by mouth 4 times daily May dissolve in 10 mL water is necessary. (Start upon completion of carafate suspension)   traZODone (DESYREL) 100 MG tablet   Yes No   traZODone (DESYREL) 50 MG tablet  Nursing Home Yes No   Sig: Take 150 mg by mouth At Bedtime    umeclidinium (INCRUSE ELLIPTA) 62.5 MCG/INH oral inhaler  Nursing Home Yes No   Sig: Inhale 1 puff into the lungs daily   zinc 50 MG TABS  Nursing Home Yes No   Sig: Take 1 tablet by mouth daily      Facility-Administered Medications: None     Allergies   Allergies   Allergen Reactions     Bee Venom      Penicillins Anaphylaxis     Other reaction(s): Skin Rash and/or Hives     Dilantin [Phenytoin] Other (See Comments)     Generic dilantin only per pt     Iodide Hives     09/11/15: Pt states she can use iodine and doesn't have any problems      Iodine-131      Novocaine [Procaine] Hives     Other reaction(s): Skin Rash and/or Hives     Tositumomab        Social History   I have reviewed this patient's social history and updated it with pertinent information if needed. Sonya Foote  reports that she quit smoking about 16 years ago, but is back to using her vaporizer. Her smoking use included Cigarettes and Cigars. She has a 75.00 pack-year smoking history. She has never used smokeless tobacco. She reports that she does not drink alcohol or use illicit drugs.     Family History   I have reviewed this patient's family history and updated it with pertinent information if needed.   Family History   Problem Relation Age of Onset     Dementia Mother      Glaucoma Mother      DIABETES Mother      may have been type 1, childhood      Coronary Artery Disease Mother      MI     Glaucoma Father      DIABETES Father      may hev been type 1 - ??     Heart Failure Father      CANCER Maternal Aunt      leukemia     Schizophrenia Brother      Depression Brother      Suicide Sister      Depression Sister      DIABETES Sister      CANCER Maternal Aunt      ovarian     Glaucoma Maternal Grandmother      DIABETES Maternal Grandmother      Glaucoma Maternal Grandfather      DIABETES Maternal Grandfather      Glaucoma Paternal Grandmother      DIABETES Paternal Grandmother      Glaucoma Paternal Grandfather      DIABETES Paternal Grandfather      Breast Cancer Sister      CEREBROVASCULAR DISEASE Brother      Colon Cancer No family hx of      Macular Degeneration No family hx of        Review of Systems   The 10 point

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
## 2018-05-02 NOTE — PLAN OF CARE
Problem: Patient Care Overview  Goal: Plan of Care/Patient Progress Review  Outcome: Improving  PRIMARY DIAGNOSIS: CHEST PAIN  OUTPATIENT/OBSERVATION GOALS TO BE MET BEFORE DISCHARGE:  1. Ruled out acute coronary syndrome (negative or stable Troponin):  No, 1 more serial trops remaining  2. Pain Status: Improved-controlled with oral pain medications and Voltaren cream  3. Appropriate provocative testing performed: No  - Stress Test Procedure: None  - Interpretation of cardiac rhythm per telemetry tech: SB    4. Cleared by Consultants (if applicable):No, cardiology consulted  5. Return to near baseline physical activity: No  Discharge Planner Nurse   Safe discharge environment identified: Yes  Barriers to discharge: No       Entered by: Susie Eng 05/02/2018 11:55 AM     Please review provider order for any additional goals.   Nurse to notify provider when observation goals have been met and patient is ready for discharge.

## 2018-05-02 NOTE — IP AVS SNAPSHOT
[facility]    [address] [place] [id number]    Phone:  [phone]                                       After Visit Summary   5/2/2018    Sonya Foote    MRN: [id number]           After Visit Summary Signature Page     I have received my discharge instructions, and my questions have been answered. I have discussed any challenges I see with this plan with the nurse or doctor.    ..........................................................................................................................................  Patient/Patient Representative Signature      ..........................................................................................................................................  Patient Representative Print Name and Relationship to Patient    ..................................................               ................................................  Date                                            Time    ..........................................................................................................................................  Reviewed by Signature/Title    ...................................................              ..............................................  Date                                                            Time

## 2018-05-02 NOTE — PLAN OF CARE
Problem: Patient Care Overview  Goal: Plan of Care/Patient Progress Review  Outcome: Improving  PRIMARY DIAGNOSIS: CHEST PAIN  OUTPATIENT/OBSERVATION GOALS TO BE MET BEFORE DISCHARGE:  1. Ruled out acute coronary syndrome (negative or stable Troponin):  No, 2 more serial trops remaining  2. Pain Status: Improved-controlled with oral pain medications.  3. Appropriate provocative testing performed: No  - Stress Test Procedure: None ordered at this time  - Interpretation of cardiac rhythm per telemetry tech:     4. Cleared by Consultants (if applicable):No, cardiology consulted  5. Return to near baseline physical activity: No  Discharge Planner Nurse   Safe discharge environment identified: Yes  Barriers to discharge: No       Entered by: Susie Eng 05/02/2018 8:02 AM     Please review provider order for any additional goals.   Nurse to notify provider when observation goals have been met and patient is ready for discharge.

## 2018-05-02 NOTE — PROCEDURES
Heart cath prelim  RCA mod ISR but normal flow reserve 1.0  LCA mild  LVG normal  Unusual blush from RV br to septum-may wish to check echo

## 2018-05-02 NOTE — CONSULTS
Cardiology  CONSULTATION        CARDIOVASCULAR CONDITIONS:  1. Atypical left sided chest pain- but similar to her pain with her heart attack in 2016  2. History of coronary artery disease inferior MI in 2016 with drug-eluting stents placed in her proximal and a mid RCA.  3. History of ischemic systolic heart failure with normalization of ejection fraction  4. Tobacco use with vaporizer-quit for 16 years, restarted 16 months ago.  5. Peripheral vascular disease to include carotid disease  6. History of multiple strokes  7. Skeletal muscle left arm pain          ASSESSMENT OF CARDIOVASCULAR STATUS:  68-year-old patient with multiple risk factors for coronary artery disease with a personal history of a inferior myocardial infarction in 2016 her chest pain is reported as similar to her pain in 2016. Current risk factors include active smoking hypertension  and diabetes mellitus.      RECOMMENDATIONS:  Patient with known coronary artery disease with previous stents placed in her RCA presents with similar symptoms. After discussion with the patient, decision has been made for a  coronary angiogram to evaluate her coronary arteries. Risk and benefits were reviewed with the patient by Dr. Berry. She has a good left radial pulse. Previous angiogram was done through her right femoral site. She has a bruit in her left femoral site.  Potassium replacement with with the daily potassium.  Smoking cessation  Continue with lisinopril and metoprolol at the current dose.        CONSULTATION REQUEST    Referring Provider:  Dr. Toni Smith  Primary Care Provider:   Allan Casey  Indication for Consultation:  Chest pain  Primary cardiologist Dr. Anderson    HISTORY OF PRESENT CARDIOVASCULAR CONDITION  oSnya Foote, a 68 year old  female was evaluated for chest pain. She has a history of coronary artery disease and vascular stanley  disease. She reported that after dinner last evening she developed stabbing chest pain, 910  in intensity. Associated with worsening shortness of breath and diaphoresis. She took 3 nitroglycerin over one hour which relieved her pain. This pain was similar to her pain she had in 2016 with her inferior MI . She developed left arm pain early this morning. Her arm pain is present only when she used her left arm. She reported no left arm pain at rest. She did not have left arm pain last night with her chest pain. She has used her upper extremities much more recently because her electric wheelchair needs a new battery. She's been using a mechanical wheelchair for the last 3 weeks. The combination of her chest pain last night and left arm pain early this morning prompted her to go to the ER.  In September 2016 the patient presented for unstable angina and inferior wall myocardial infarction to the [facility]. She underwent coronary angiography with high-grade stenosis of the proximal right coronary artery found.  Intracoronary stenting was performed of both proximal and mid 90% RCA lesions. Her proximal LAD had a 25-50% lesion, with the mid apical and diagonal branches with less than 25% lesions. Her circumflex artery had 25% lesions in the proximal and first OM marginal branch.  Her ejection fraction fell to 35%-40%. Previous her ejection fraction  by cardiac MRI in June of 2016 was 60%.  She has remained on lisinopril, metoprolol and  Aspirin. Last Fall her plavix was stopped.   In follow-up appointments with Dr. Anderson she reported intermittent episodes of right and substernal chest pressure relieved with sublingual nitroglycerin. Most recently in September 2017 she underwent a Lexiscan which demonstrated a small, basal inferior nontransmural infarction and a very small area of mild ischemia involving the distal inferolateral wall and apex. Gated images demonstrated mild basal inferior hypokinesis. The  left ventricular systolic function is normal, with an ejection fraction measured at 80%.   Monica recommended stopping Plavix.    Cardiac risk factors include:   diabetes mellitus, peripheral vascular disease carotid disease smoking hypertension   Past medical history in Epic  reviewed and includes carotid disease with previous stenting of her left carotid artery in April 2016. She is followed by vascular surgery. Recent ultrasound of her carotid arteries demonstrated normal flow. She has peripheral artery disease status post bilateral above-the-knee amputation. History of systolic hypertension which has been under good control.        PAST MEDICAL HISTORY:  Past Medical History:   Diagnosis Date     Anemia      Antiplatelet or antithrombotic long-term use      Arthritis      AS (sickle cell trait) (H) 10/8/2013     Blind left eye      BPPV (benign paroxysmal positional vertigo)      Chronic back pain      COPD (chronic obstructive pulmonary disease) (H)      Coronary artery disease      CVA (cerebral infarction) 10/8/2012    x3, residual left sided weakness     Diastolic CHF (H) 9/4/2012     Dilantin toxicity 5/31/2013     Esophageal candidiasis (H)      GERD (gastroesophageal reflux disease)      Heart attack      Hernia, abdominal      History of blood transfusion     x5     History of thrombophlebitis      HTN (hypertension) 9/4/2012     Hyperlipidemia LDL goal <100 9/4/2012     Legally blind in right eye, as defined in [place]     No periphal vision right eye     Osteoporosis 1/21/2013     Other chronic pain      PAD (peripheral artery disease) (H)      Palpitations      Person who has had sex change operation 1/20/2014     Pneumonia      Schizoaffective disorder (H)      Seizure disorder (H) 9/4/2012     Sleep apnea     CPAP     Thrombosis of leg      Type 2 diabetes, HbA1C goal < 8% (H) 9/4/2012     Uncomplicated asthma      Unspecified cerebral artery occlusion with cerebral infarction        CURRENT MEDICATIONS:    aspirin  81 mg Oral Daily     atorvastatin  40 mg Oral Daily     brimonidine  1 drop  Right Eye BID     Calcium carb-Vitamin D 500 mg Rampart-200 units  1 tablet Oral BID w/meals     diclofenac  2 g Topical 4x Daily     escitalopram  10 mg Oral Daily     estradiol  1 mg Oral Daily     fluticasone  1 spray Both Nostrils Daily     fluticasone-vilanterol  1 puff Inhalation Daily     hydrochlorothiazide  12.5 mg Oral Daily     insulin aspart  1-3 Units Subcutaneous TID AC     insulin aspart  1-3 Units Subcutaneous At Bedtime     latanoprost  1 drop Both Eyes At Bedtime     levETIRAcetam  500 mg Oral BID     Lidocaine  3 patch Transdermal Q24H     lidocaine   Transdermal Q8H     lidocaine   Transdermal Q24h     lisinopril  10 mg Oral Daily     metFORMIN  500 mg Oral Daily with supper     pantoprazole  40 mg Oral Daily     phenytoin  200 mg Oral BID     polyethylene glycol  17 g Oral Daily     pregabalin  150 mg Oral BID     risperiDONE  0.5 mg Oral At Bedtime     sodium chloride (PF)  3 mL Intracatheter Q8H     sucralfate  1 g Oral 4x Daily     traZODone  150 mg Oral At Bedtime     umeclidinium  1 puff Inhalation Daily         PAST SURGICAL HISTORY:  Past Surgical History:   Procedure Laterality Date     AMPUTATE LEG ABOVE KNEE Left 6/11/2016    Procedure: AMPUTATE LEG ABOVE KNEE;  Surgeon: Mello Rodriguez MD;  [facility]     AMPUTATE LEG BELOW KNEE Right 11/7/2016    Procedure: AMPUTATE LEG BELOW KNEE;  Surgeon: Savannah Durant MD;  [facility]     AMPUTATE REVISION STUMP LOWER EXTREMITY Right 11/11/2016    Procedure: AMPUTATE REVISION STUMP LOWER EXTREMITY;  Surgeon: Savannah Durant MD;  [facility]     AMPUTATE REVISION STUMP LOWER EXTREMITY Right 11/16/2016    Procedure: AMPUTATE REVISION STUMP LOWER EXTREMITY;  Surgeon: Savannah Durant MD;  [facility]     AMPUTATE TOE(S) Right 1/5/2016    Procedure: AMPUTATE TOE(S);  Surgeon: Mello Gaines DPM;  Location: [facility]     ANGIOGRAM Bilateral 11/21/2014    Procedure: ANGIOGRAM;  Surgeon: Savannah Durant MD;  [facility]     ANGIOGRAM  Left 1/16/2015    Procedure: ANGIOGRAM;  Surgeon: Savannah Durant MD;  [facility]     ANGIOGRAM Bilateral 9/14/2015    Procedure: ANGIOGRAM;  Surgeon: Savannah Durant MD;  [facility]     ANGIOGRAM Left 10/12/2015    Procedure: ANGIOGRAM;  Surgeon: Savannah Durant MD;  [facility]     ANGIOGRAM Right 6/6/2016    Procedure: ANGIOGRAM;  Surgeon: Savannah Durant MD;  [facility]     ANGIOPLASTY Right 6/6/2016    Procedure: ANGIOPLASTY;  Surgeon: Savannah Durant MD;  [facility]     APPENDECTOMY       BREAST SURGERY      right breast bx (benign)     CATARACT IOL, RT/LT Right      CHOLECYSTECTOMY       COLONOSCOPY N/A 8/25/2014    Procedure: COLONOSCOPY;  Surgeon: Mello Ferrer MD;  [facility]     COLONOSCOPY WITH CO2 INSUFFLATION N/A 8/20/2014    Procedure: COLONOSCOPY WITH CO2 INSUFFLATION;  Surgeon: Duane, William Charles, MD;  Location: [facility]     CYSTOSTOMY, INSERT TUBE SUPRAPUBIC, COMBINED N/A 1/16/2018    Procedure: COMBINED CYSTOSTOMY, INSERT TUBE SUPRAPUBIC;  Cystoscopy, Intraoperative Ultrasound, Suprapubic Tube Placement;  Surgeon: Keanu Dawson MD;  [facility]     ENDARTERECTOMY FEMORAL  5/23/2014    Procedure: ENDARTERECTOMY FEMORAL;  Surgeon: Jason Joshi MD;  [facility]     ESOPHAGOSCOPY, GASTROSCOPY, DUODENOSCOPY (EGD), COMBINED  12/14/2012    Procedure: COMBINED ESOPHAGOSCOPY, GASTROSCOPY, DUODENOSCOPY (EGD), BIOPSY SINGLE OR MULTIPLE;  ESOPHAGOSCOPY, GASTROSCOPY, DUODENOSCOPY (EGD), DILATATION ;  Surgeon: Elizabeth Stevenson MD;  Location:  GI     ESOPHAGOSCOPY, GASTROSCOPY, DUODENOSCOPY (EGD), COMBINED  12/31/2013    Procedure: COMBINED ESOPHAGOSCOPY, GASTROSCOPY, DUODENOSCOPY (EGD), BIOPSY SINGLE OR MULTIPLE;;  Surgeon: Clemente Lopez MD;  Location:  GI     ESOPHAGOSCOPY, GASTROSCOPY, DUODENOSCOPY (EGD), COMBINED  4/1/2014    Procedure: COMBINED ESOPHAGOSCOPY, GASTROSCOPY, DUODENOSCOPY (EGD);;  Surgeon: Clemente Lopez MD;  [facility]: [facility]      ESOPHAGOSCOPY, GASTROSCOPY, DUODENOSCOPY (EGD), COMBINED  6/28/2014    Procedure: COMBINED ESOPHAGOSCOPY, GASTROSCOPY, DUODENOSCOPY (EGD);  Surgeon: Clemente Lopez MD;  [facility]     ESOPHAGOSCOPY, GASTROSCOPY, DUODENOSCOPY (EGD), COMBINED N/A 8/20/2014    Procedure: COMBINED ESOPHAGOSCOPY, GASTROSCOPY, DUODENOSCOPY (EGD), BIOPSY SINGLE OR MULTIPLE;  Surgeon: Duane, William Charles, MD;  Location:  OR     ESOPHAGOSCOPY, GASTROSCOPY, DUODENOSCOPY (EGD), COMBINED N/A 8/22/2014    Procedure: COMBINED ESOPHAGOSCOPY, GASTROSCOPY, DUODENOSCOPY (EGD), BIOPSY SINGLE OR MULTIPLE;  Surgeon: Mello Ferrer MD;  Location:  GI     ESOPHAGOSCOPY, GASTROSCOPY, DUODENOSCOPY (EGD), COMBINED N/A 10/2/2014    Procedure: COMBINED ESOPHAGOSCOPY, GASTROSCOPY, DUODENOSCOPY (EGD), BIOPSY SINGLE OR MULTIPLE;  Surgeon: Remy Haskins MD;  Location:  GI     ESOPHAGOSCOPY, GASTROSCOPY, DUODENOSCOPY (EGD), COMBINED Left 12/15/2014    Procedure: COMBINED ESOPHAGOSCOPY, GASTROSCOPY, DUODENOSCOPY (EGD), BIOPSY SINGLE OR MULTIPLE;  Surgeon: Remy Haskins MD;  Location:  GI     ESOPHAGOSCOPY, GASTROSCOPY, DUODENOSCOPY (EGD), COMBINED N/A 2/25/2015    Procedure: COMBINED ENDOSCOPIC ULTRASOUND, ESOPHAGOSCOPY, GASTROSCOPY, DUODENOSCOPY (EGD), FINE NEEDLE ASPIRATE/BIOPSY;  Surgeon: Clemente Lugo MD;  Location:  GI     ESOPHAGOSCOPY, GASTROSCOPY, DUODENOSCOPY (EGD), COMBINED Left 2/25/2015    Procedure: COMBINED ESOPHAGOSCOPY, GASTROSCOPY, DUODENOSCOPY (EGD), BIOPSY SINGLE OR MULTIPLE;  Surgeon: Clemente Lugo MD;  Location:  GI     ESOPHAGOSCOPY, GASTROSCOPY, DUODENOSCOPY (EGD), COMBINED N/A 9/25/2016    Procedure: COMBINED ESOPHAGOSCOPY, GASTROSCOPY, DUODENOSCOPY (EGD);  Surgeon: Aziza Patiño MD;  [facility]     ESOPHAGOSCOPY, GASTROSCOPY, DUODENOSCOPY (EGD), COMBINED N/A 1/18/2017    Procedure: COMBINED ESOPHAGOSCOPY, GASTROSCOPY, DUODENOSCOPY (EGD), BIOPSY SINGLE OR MULTIPLE;  Surgeon:  Clemente Lopez MD;  [facility]     ESOPHAGOSCOPY, GASTROSCOPY, DUODENOSCOPY (EGD), COMBINED N/A 11/26/2017    Procedure: COMBINED ESOPHAGOSCOPY, GASTROSCOPY, DUODENOSCOPY (EGD), REMOVE FOREIGN BODY;  Esophagogastroduodenoscopy with foreign body extraction  ;  Surgeon: Herberth Castrejon MD;  [facility]     ESOPHAGOSCOPY, GASTROSCOPY, DUODENOSCOPY (EGD), COMBINED N/A 11/26/2017    Procedure: COMBINED ESOPHAGOSCOPY, GASTROSCOPY, DUODENOSCOPY (EGD), REMOVE FOREIGN BODY;;  Surgeon: Herberth Castrejon MD;  [facility]     FASCIOTOMY LOWER EXTREMITY Left 6/10/2016    Procedure: FASCIOTOMY LOWER EXTREMITY;  Surgeon: Mello Rodriguez MD;  [facility]     HC CAPSULE ENDOSCOPY N/A 8/25/2014    Procedure: CAPSULE/PILL CAM ENDOSCOPY;  Surgeon: Remy Haskins MD;  [facility]     HC CAPSULE ENDOSCOPY N/A 10/2/2014    Procedure: CAPSULE/PILL CAM ENDOSCOPY;  Surgeon: Remy Haskins MD;  [facility]     ORTHOPEDIC SURGERY      broken wrist repair     SEX TRANSFORMATION SURGERY, MALE TO FEMALE      1974     SINUS SURGERY      cyst removed     TONSILLECTOMY       VASCULAR SURGERY      Left carotid stent       ALLERGIES  Allergies   Allergen Reactions     Bee Venom      Penicillins Anaphylaxis     Other reaction(s): Skin Rash and/or Hives     Dilantin [Phenytoin] Other (See Comments)     Generic dilantin only per pt     Iodide Hives     09/11/15: Pt states she can use iodine and doesn't have any problems      Iodine-131      Novocaine [Procaine] Hives     Other reaction(s): Skin Rash and/or Hives     Tositumomab         FAMILY HX:  Family History   Problem Relation Age of Onset     Dementia Mother      Glaucoma Mother      DIABETES Mother      may have been type 1, childhood     Coronary Artery Disease Mother      MI     Glaucoma Father      DIABETES Father      may hev been type 1 - ??     Heart Failure Father      CANCER Maternal Aunt      leukemia     Schizophrenia Brother       Depression Brother      Suicide Sister      Depression Sister      DIABETES Sister      CANCER Maternal Aunt      ovarian     Glaucoma Maternal Grandmother      DIABETES Maternal Grandmother      Glaucoma Maternal Grandfather      DIABETES Maternal Grandfather      Glaucoma Paternal Grandmother      DIABETES Paternal Grandmother      Glaucoma Paternal Grandfather      DIABETES Paternal Grandfather      Breast Cancer Sister      CEREBROVASCULAR DISEASE Brother      Colon Cancer No family hx of      Macular Degeneration No family hx of        SOCIAL HX: Patient reports she will move to [place] in 3 months. She's currently living in assisted living. She has a son and a daughter and a grandson in the [facility].  Social History     Social History     Marital status:      Spouse name: N/A     Number of children: 2     Years of education: N/A     Occupational History     retired      retired     Social History Main Topics     Smoking status: Former Smoker     Packs/day: 2.50     Years: 30.00     Types: Cigarettes, Cigars     Quit date: 11/1/2001     Smokeless tobacco: Never Used     Alcohol use No     Drug use: No     Sexual activity: Not Currently     Other Topics Concern     Caffeine Concern No     1 in the morning     Social History Narrative      Her father was in the  and she moved around a lot when she was a kid, and has lived abroad including in [place] and the [facility].  She was previously employed as a mental health worker. Moved from [place] to [place] in 2012. She is currently living in assisted living ([facility] in [facility]).  Wife passed away 6/2017.            She has 2 adopted children (Kam and Prashanth)       ROS:  Constitutional: No fever, chills, or sweats. No weight gain/loss. Poor appetite  ENT: No visual disturbance, ear ache, epistaxis, sore throat. Blind in her left eye  Allergies/Immunologic: Negative.   Respiratory: No  hemoptysis. Productive cough with yellow sputum  since treatment for bronchitis 3 weeks ago  Cardiovascular: As per HPI.   GI: No nausea, vomiting, hematemesis, melena, or hematochezia.   : No urinary frequency, dysuria, or hematuria. Has a super pubic catheter  Integument: Negative.   Psychiatric: Negative.   Neuro: Negative.   Endocrinology: Negative.   Musculoskeletal: No myalgia. Non-weightbearing, wheelchair-restricted    VITAL SIGNS:  BP 93/49 (BP Location: Right arm)  Pulse 75  Temp 97.4  F (36.3  C) (Oral)  Resp 16  Wt 59.6 kg (131 lb 6.4 oz)  SpO2 94%  BMI 20.58 kg/m2  Body mass index is 20.58 kg/(m^2).  Wt Readings from Last 2 Encounters:   05/02/18 59.6 kg (131 lb 6.4 oz)   04/18/18 55.8 kg (123 lb)       PHYSICAL EXAM    Sonya Foote is a 68 year old female comforable at rest.    HEENT: No xanthelasma, sclerae white, oral mucosa moist, no neck masses, thyroid  not palpable.   Neck: no neck masses, thyroid not palpable.  Lungs: normal breath sounds, no rales or wheezing   Thorax: No chest wall tenderness  Cor:  Normal S1 and S2.  No murmur, rub, or gallop.  Rhythm regular and rate normal.  Carotid, brachial, radial, right  femoral,No carotid buit. Positive left femoral bruit. Strong radial pulse  Abd: Soft, nontender, nondistended.  No pulsatile mass.    Extremities: Bilateral stumps soft without edema  Neuro: Nor focal deficits observed.   Mental status: normal judgement and insight    LABS-troponins normal    No components found for: TROPONINIES  Lab Results   Component Value Date    WBC 9.0 05/02/2018     Lab Results   Component Value Date    RBC 3.83 05/02/2018     Lab Results   Component Value Date    HGB 11.4 05/02/2018     Lab Results   Component Value Date    HCT 33.5 05/02/2018     No components found for: MCT  Lab Results   Component Value Date    MCV 88 05/02/2018     Lab Results   Component Value Date    MCH 29.8 05/02/2018     Lab Results   Component Value Date    MCHC 34.0 05/02/2018     Lab Results   Component Value Date    RDW 14.5  05/02/2018     Lab Results   Component Value Date     05/02/2018      Recent Labs   Lab Test  05/02/18   0953  05/02/18   0330  02/09/18   0600   NA   --   141  139   POTASSIUM  3.4  2.9*  3.4   CHLORIDE   --   105  107   CO2   --   28  23   ANIONGAP   --   8  8   GLC   --   83  92   BUN   --   6*  9   CR   --   0.52  0.82   CAROL   --   8.5  8.7     Recent Labs   Lab Test  06/06/16   0648  09/15/15   0749  07/22/14   1209   CHOL  155  132  179   HDL  65  49*  50*   LDL  77  71  104   TRIG  62  62  124   CHOLHDLRATIO   --   2.7  3.6        EKG:  Sinus rhythm with some  LVH    ECHO:

## 2018-05-02 NOTE — PROGRESS NOTES
5/2/18: Received [facility] notification of ED visit for chest pain and hypokalemia.  Member under observation at [facility].  Will continue to follow notes.     Member undergoing coronary angiogram.     Transition log initiated.   PCP notified of hospitalization via EMR.      Jamie Sharma RN, PHN  [facility]

## 2018-05-02 NOTE — ED NOTES
[facility]  ED Nurse Handoff Report    ED Chief complaint: Chest Pain (started about 4 hours ago, 3 nitro did not resolve it, hx of stents and MI EMS gave 324 ASA  and 4 of Zofran blood sugar 116)      ED Diagnosis:   Final diagnoses:   Acute chest pain   History of coronary artery disease   Abnormal cardiovascular stress test   Hypokalemia       Code Status: DNR / DNI    Allergies:   Allergies   Allergen Reactions     Bee Venom      Penicillins Anaphylaxis     Other reaction(s): Skin Rash and/or Hives     Dilantin [Phenytoin] Other (See Comments)     Generic dilantin only per pt     Iodide Hives     09/11/15: Pt states she can use iodine and doesn't have any problems      Iodine-131      Novocaine [Procaine] Hives     Other reaction(s): Skin Rash and/or Hives     Tositumomab        Activity level - Baseline/Home:  Total Care    Activity Level - Current:   Total Care     Needed?: No    Isolation: No  Infection: Not Applicable    Bariatric?: No    Vital Signs:   Vitals:    05/02/18 0329   BP: 101/59   Pulse: 75   Resp: 20   Temp: 98.3  F (36.8  C)   TempSrc: Oral   SpO2: 96%       Cardiac Rhythm: ,   Cardiac  Cardiac Rhythm: Normal sinus rhythm    Pain level: 0-10 Pain Scale: 8    Is this patient confused?: No     Patient Report: Initial Complaint: Sonya Foote is a 68 year old female with a complex past medical history including CAD, diastolic CHF, Diabetes, hyperlipidemia, hypertension, and CVA who presents from her assisted living facility via EMS with chest pain. The patient states that she developed midsternal chest pain while smoking approximately 4 hours ago. Initially, took some tums, but this did not resolve her pain. She states that the pain does feel similar to when she had her prior MIs. The patient did take 3 nitroglycerin without improvement of her symptoms. She was given 324 mg of aspirin per EMS along with 4 mg of Zofran as she became nauseated during her transport to the ED.  The patient denies any new cough. Additionally, the patient does report having some vaginal irritation and bleeding. She did have her catheter changed 2 weeks ago and states that it is draining well.   Focused Assessment: also noted to have a healing burn on her right upper thigh, patient is blind as well and had a coffee spill that caused the burn, it is healing but painful.   Tests Performed: labs, EKG, Cxray  Abnormal Results: see epic  Treatments provided: K+ 40 mEq packet oral    Family Comments: none present    OBS brochure/video discussed/provided to patient: Yes    ED Medications:   Medications   morphine (PF) injection 4 mg (4 mg Intravenous Given 5/2/18 0358)   potassium chloride 10 mEq in 100 mL intermittent infusion with 10 mg lidocaine (not administered)   potassium chloride (KLOR-CON) Packet 40 mEq (not administered)       Drips infusing?:  No    For the majority of the shift this patient was Green.   Interventions performed were none.    Severe Sepsis OR Septic Shock Diagnosis Present: No      ED NURSE PHONE NUMBER: [phone]

## 2018-05-02 NOTE — PROGRESS NOTES
RECEIVING UNIT ED HANDOFF REVIEW    ED Nurse Handoff Report was reviewed by: Susan Angel on May 2, 2018 at 5:45 AM

## 2018-05-02 NOTE — PROVIDER NOTIFICATION
MD Notification    Notified Person: MD    Notified Person Name: Dr. Berry    Notification Date/Time: Today at 1356    Notification Interaction: Pager & telephone    Purpose of Notification: One time dose of 20 meq ordered. Do we need to give more than 20 meq for K+ less than 3.4 per cath orders.     Orders Received: Only 20 meq K+ is ok    Comments:

## 2018-05-02 NOTE — PROGRESS NOTES
Pt s/p heart cath. Bandaid CDI to right groin puncture site. No oozing or hematoma noted. Area soft & flat. Pt denies pain. Pt instructed on activity restrictions while on bedrest until 17:30.    VSS.  Patient resting comfortably.    Detailed report called to EDWARDO Lawler in OBS.    Pt transferred to OBS rm 1.

## 2018-05-02 NOTE — ED NOTES
Bed: ED25  Expected date:   Expected time:   Means of arrival:   Comments:  N715 68f CP & cough. ETA 0320     Ronald Blevins, RN  05/02/18 0328

## 2018-05-03 ENCOUNTER — APPOINTMENT (OUTPATIENT)
Dept: PHYSICAL THERAPY | Facility: CLINIC | Age: 68
End: 2018-05-03
Attending: PHYSICIAN ASSISTANT
Payer: COMMERCIAL

## 2018-05-03 VITALS
BODY MASS INDEX: 20.58 KG/M2 | TEMPERATURE: 98.6 F | OXYGEN SATURATION: 96 % | DIASTOLIC BLOOD PRESSURE: 77 MMHG | RESPIRATION RATE: 16 BRPM | WEIGHT: 131.4 LBS | SYSTOLIC BLOOD PRESSURE: 153 MMHG | HEART RATE: 75 BPM

## 2018-05-03 LAB
ANION GAP SERPL CALCULATED.3IONS-SCNC: 6 MMOL/L (ref 3–14)
BUN SERPL-MCNC: 6 MG/DL (ref 7–30)
CALCIUM SERPL-MCNC: 8.7 MG/DL (ref 8.5–10.1)
CHLORIDE SERPL-SCNC: 110 MMOL/L (ref 94–109)
CO2 SERPL-SCNC: 28 MMOL/L (ref 20–32)
CREAT SERPL-MCNC: 0.48 MG/DL (ref 0.52–1.04)
GFR SERPL CREATININE-BSD FRML MDRD: >90 ML/MIN/1.7M2
GLUCOSE BLDC GLUCOMTR-MCNC: 86 MG/DL (ref 70–99)
GLUCOSE BLDC GLUCOMTR-MCNC: 87 MG/DL (ref 70–99)
GLUCOSE BLDC GLUCOMTR-MCNC: 96 MG/DL (ref 70–99)
GLUCOSE SERPL-MCNC: 84 MG/DL (ref 70–99)
POTASSIUM SERPL-SCNC: 3.8 MMOL/L (ref 3.4–5.3)
SODIUM SERPL-SCNC: 144 MMOL/L (ref 133–144)

## 2018-05-03 PROCEDURE — G0378 HOSPITAL OBSERVATION PER HR: HCPCS

## 2018-05-03 PROCEDURE — 25000132 ZZH RX MED GY IP 250 OP 250 PS 637: Performed by: NURSE PRACTITIONER

## 2018-05-03 PROCEDURE — 99217 ZZC OBSERVATION CARE DISCHARGE: CPT | Performed by: PHYSICIAN ASSISTANT

## 2018-05-03 PROCEDURE — 94640 AIRWAY INHALATION TREATMENT: CPT | Mod: 76

## 2018-05-03 PROCEDURE — 97161 PT EVAL LOW COMPLEX 20 MIN: CPT | Mod: GP

## 2018-05-03 PROCEDURE — 40000193 ZZH STATISTIC PT WARD VISIT

## 2018-05-03 PROCEDURE — 94660 CPAP INITIATION&MGMT: CPT

## 2018-05-03 PROCEDURE — 00000146 ZZHCL STATISTIC GLUCOSE BY METER IP

## 2018-05-03 PROCEDURE — 25000132 ZZH RX MED GY IP 250 OP 250 PS 637: Performed by: INTERNAL MEDICINE

## 2018-05-03 PROCEDURE — 25000132 ZZH RX MED GY IP 250 OP 250 PS 637: Performed by: PHYSICIAN ASSISTANT

## 2018-05-03 PROCEDURE — 99214 OFFICE O/P EST MOD 30 MIN: CPT | Performed by: INTERNAL MEDICINE

## 2018-05-03 PROCEDURE — 80048 BASIC METABOLIC PNL TOTAL CA: CPT | Performed by: INTERNAL MEDICINE

## 2018-05-03 PROCEDURE — 36415 COLL VENOUS BLD VENIPUNCTURE: CPT | Performed by: INTERNAL MEDICINE

## 2018-05-03 PROCEDURE — 25000125 ZZHC RX 250: Performed by: PHYSICIAN ASSISTANT

## 2018-05-03 PROCEDURE — 40000275 ZZH STATISTIC RCP TIME EA 10 MIN: Mod: 76

## 2018-05-03 PROCEDURE — 94640 AIRWAY INHALATION TREATMENT: CPT

## 2018-05-03 RX ORDER — ALBUTEROL SULFATE 0.83 MG/ML
3 SOLUTION RESPIRATORY (INHALATION)
Status: DISCONTINUED | OUTPATIENT
Start: 2018-05-03 | End: 2018-05-03 | Stop reason: HOSPADM

## 2018-05-03 RX ORDER — LISINOPRIL 20 MG/1
20 TABLET ORAL DAILY
Status: DISCONTINUED | OUTPATIENT
Start: 2018-05-04 | End: 2018-05-03 | Stop reason: HOSPADM

## 2018-05-03 RX ORDER — LISINOPRIL 10 MG/1
10 TABLET ORAL ONCE
Status: COMPLETED | OUTPATIENT
Start: 2018-05-03 | End: 2018-05-03

## 2018-05-03 RX ORDER — METOPROLOL SUCCINATE 50 MG/1
50 TABLET, EXTENDED RELEASE ORAL DAILY
Qty: 90 TABLET | Refills: 0 | Status: SHIPPED | OUTPATIENT
Start: 2018-05-04 | End: 2018-05-09

## 2018-05-03 RX ORDER — SILVER SULFADIAZINE 10 MG/G
CREAM TOPICAL 2 TIMES DAILY
Status: DISCONTINUED | OUTPATIENT
Start: 2018-05-03 | End: 2018-05-03 | Stop reason: HOSPADM

## 2018-05-03 RX ORDER — LISINOPRIL 20 MG/1
20 TABLET ORAL DAILY
Qty: 90 TABLET | Refills: 0 | Status: SHIPPED | OUTPATIENT
Start: 2018-05-04 | End: 2018-05-09

## 2018-05-03 RX ORDER — METOPROLOL SUCCINATE 50 MG/1
50 TABLET, EXTENDED RELEASE ORAL DAILY
Status: DISCONTINUED | OUTPATIENT
Start: 2018-05-04 | End: 2018-05-03 | Stop reason: HOSPADM

## 2018-05-03 RX ORDER — METOPROLOL SUCCINATE 25 MG/1
25 TABLET, EXTENDED RELEASE ORAL ONCE
Status: COMPLETED | OUTPATIENT
Start: 2018-05-03 | End: 2018-05-03

## 2018-05-03 RX ORDER — ONDANSETRON 2 MG/ML
4 INJECTION INTRAMUSCULAR; INTRAVENOUS EVERY 6 HOURS PRN
Status: DISCONTINUED | OUTPATIENT
Start: 2018-05-03 | End: 2018-05-03 | Stop reason: HOSPADM

## 2018-05-03 RX ORDER — AMLODIPINE BESYLATE 2.5 MG/1
2.5 TABLET ORAL DAILY
Status: DISCONTINUED | OUTPATIENT
Start: 2018-05-03 | End: 2018-05-03

## 2018-05-03 RX ORDER — ONDANSETRON 4 MG/1
4 TABLET, ORALLY DISINTEGRATING ORAL EVERY 6 HOURS PRN
Status: DISCONTINUED | OUTPATIENT
Start: 2018-05-03 | End: 2018-05-03 | Stop reason: HOSPADM

## 2018-05-03 RX ORDER — NICOTINE 21 MG/24HR
1 PATCH, TRANSDERMAL 24 HOURS TRANSDERMAL DAILY
Qty: 30 PATCH | Refills: 0 | Status: SHIPPED | OUTPATIENT
Start: 2018-05-04 | End: 2018-05-09

## 2018-05-03 RX ORDER — SILVER SULFADIAZINE 10 MG/G
CREAM TOPICAL 2 TIMES DAILY
Qty: 50 G | Refills: 0 | Status: SHIPPED | OUTPATIENT
Start: 2018-05-03 | End: 2018-05-09

## 2018-05-03 RX ADMIN — PANTOPRAZOLE SODIUM 40 MG: 40 TABLET, DELAYED RELEASE ORAL at 08:29

## 2018-05-03 RX ADMIN — NICOTINE 1 PATCH: 14 PATCH, EXTENDED RELEASE TRANSDERMAL at 08:28

## 2018-05-03 RX ADMIN — PREGABALIN 150 MG: 75 CAPSULE ORAL at 08:29

## 2018-05-03 RX ADMIN — FLUTICASONE PROPIONATE 1 SPRAY: 50 SPRAY, METERED NASAL at 08:50

## 2018-05-03 RX ADMIN — SUCRALFATE 1 G: 1 TABLET ORAL at 13:28

## 2018-05-03 RX ADMIN — DICLOFENAC SODIUM 2 G: 10 GEL TOPICAL at 08:49

## 2018-05-03 RX ADMIN — METOPROLOL SUCCINATE 25 MG: 25 TABLET, EXTENDED RELEASE ORAL at 08:29

## 2018-05-03 RX ADMIN — ACETAMINOPHEN 1000 MG: 500 TABLET, FILM COATED ORAL at 00:50

## 2018-05-03 RX ADMIN — ALBUTEROL SULFATE 2.5 MG: 2.5 SOLUTION RESPIRATORY (INHALATION) at 15:51

## 2018-05-03 RX ADMIN — ONDANSETRON 4 MG: 4 TABLET, ORALLY DISINTEGRATING ORAL at 10:50

## 2018-05-03 RX ADMIN — BRIMONIDINE TARTRATE 1 DROP: 2 SOLUTION OPHTHALMIC at 08:48

## 2018-05-03 RX ADMIN — ACETAMINOPHEN 1000 MG: 500 TABLET, FILM COATED ORAL at 08:29

## 2018-05-03 RX ADMIN — FLUTICASONE FUROATE AND VILANTEROL TRIFENATATE 1 PUFF: 200; 25 POWDER RESPIRATORY (INHALATION) at 08:49

## 2018-05-03 RX ADMIN — SUCRALFATE 1 G: 1 TABLET ORAL at 16:44

## 2018-05-03 RX ADMIN — METOPROLOL SUCCINATE 25 MG: 25 TABLET, EXTENDED RELEASE ORAL at 10:50

## 2018-05-03 RX ADMIN — LISINOPRIL 10 MG: 10 TABLET ORAL at 08:29

## 2018-05-03 RX ADMIN — LISINOPRIL 10 MG: 10 TABLET ORAL at 10:50

## 2018-05-03 RX ADMIN — DICLOFENAC SODIUM 2 G: 10 GEL TOPICAL at 16:44

## 2018-05-03 RX ADMIN — ASPIRIN 81 MG: 81 TABLET, COATED ORAL at 08:29

## 2018-05-03 RX ADMIN — DICLOFENAC SODIUM 2 G: 10 GEL TOPICAL at 13:28

## 2018-05-03 RX ADMIN — ESTRADIOL 1 MG: 1 TABLET ORAL at 08:29

## 2018-05-03 RX ADMIN — ESCITALOPRAM 10 MG: 10 TABLET, FILM COATED ORAL at 08:29

## 2018-05-03 RX ADMIN — ALBUTEROL SULFATE 2.5 MG: 2.5 SOLUTION RESPIRATORY (INHALATION) at 10:55

## 2018-05-03 RX ADMIN — UMECLIDINIUM 1 PUFF: 62.5 AEROSOL, POWDER ORAL at 08:51

## 2018-05-03 RX ADMIN — ATORVASTATIN CALCIUM 40 MG: 40 TABLET, FILM COATED ORAL at 08:30

## 2018-05-03 RX ADMIN — LIDOCAINE 3 PATCH: 560 PATCH PERCUTANEOUS; TOPICAL; TRANSDERMAL at 08:28

## 2018-05-03 RX ADMIN — PHENYTOIN SODIUM 200 MG: 100 CAPSULE, EXTENDED RELEASE ORAL at 08:29

## 2018-05-03 RX ADMIN — SUCRALFATE 1 G: 1 TABLET ORAL at 08:29

## 2018-05-03 RX ADMIN — LEVETIRACETAM 500 MG: 500 TABLET, FILM COATED ORAL at 08:29

## 2018-05-03 NOTE — PLAN OF CARE
Problem: Patient Care Overview  Goal: Plan of Care/Patient Progress Review  PRIMARY DIAGNOSIS: ACUTE PAIN  OUTPATIENT/OBSERVATION GOALS TO BE MET BEFORE DISCHARGE:  1. Pain Status: Improved-controlled with oral pain medications.    2. Return to near baseline physical activity: No    3. Cleared for discharge by consultants (if involved): No    Discharge Planner Nurse   Safe discharge environment identified: No  Barriers to discharge: Yes       Entered by: Tima Georges 05/03/2018 5:22 AM     Please review provider order for any additional goals.   Nurse to notify provider when observation goals have been met and patient is ready for discharge.

## 2018-05-03 NOTE — PLAN OF CARE
Problem: Patient Care Overview  Goal: Plan of Care/Patient Progress Review  PRIMARY DIAGNOSIS: ACUTE PAIN  OUTPATIENT/OBSERVATION GOALS TO BE MET BEFORE DISCHARGE:  1. Pain Status: Improved-controlled with oral pain medications.    2. Return to near baseline physical activity: No    3. Cleared for discharge by consultants (if involved): No    Discharge Planner Nurse   Safe discharge environment identified: No  Barriers to discharge: Yes       Entered by: Tima Georges 05/03/2018 2:40 AM     Please review provider order for any additional goals.   Nurse to notify provider when observation goals have been met and patient is ready for discharge.

## 2018-05-03 NOTE — PLAN OF CARE
Problem: Patient Care Overview  Goal: Plan of Care/Patient Progress Review  PT:  Discharge Planner PT   Patient plan for discharge: Return home  Current status: Orders received, eval completed. Pt admitted under observation status with chest pain, L arm pain. Prior to admit pt was living in an long term apartment, is wc bound due to B AKA and transfers independently at baseline. Currently pt is independent with bed to/from wc transfers with wc set up with instruction from patient directly against the bed. Pt does not have any skilled PT needs at this time and is at baseline mobility. Pt in agreement. States she has L arm pain but it does not stop her from transferring or pushing herself in the wc.  Barriers to return to prior living situation: None  Recommendations for discharge: Return to EZ apartment  Rationale for recommendations: No skilled PT needs present, pt at baseline       Entered by: Ying Sánchez 05/03/2018 10:49 AM

## 2018-05-03 NOTE — PROGRESS NOTES
[facility]    Cardiology Progress Note    Date of Service (when I saw the patient): 2018     Assessment & Plan   Sonya Foote is a 68 year old female who was admitted on 2018 with chest pain. Active smoker with hx of CAD, vascularpath, htn, stroke (short term memory problems), ischemic systolic HF with normalization of EF, DMII, bialteral above knee amputee 2016, bilateral carotid artery disease s/p L CA stent.    Chest pain with hx of previous RCA stenting  -hyperdynamic LV, EF >70%, no sig valvular dz on echo  -negative troponins, creat 0.48  -IFR of RCA was nml at 1.0, some in-stent restenosis. Some blushing of a small RV branch  -EF 52-55% on cath  -Mild CAD in left coronary system  Plan: Continue ASA, statin, ACE, BB    CAD  -previous stenting to prox and mid RCA    HTN  --169  -lisinopril 10, Toprol 25  Plan: Hypertensive and hyperdyanmic heart dz on echo. Double BB and ACE. Follow in clinic. If cant tolerate BB, recommend switch to diltiazem 120.     Smoking  -wearing patch  -counseled to stop, she had stopped for 16 years but restarted when her wife  last year.      Follow up:  See Shaneka  at 1:30, bmp prior  Keep f/u with Dr. Anderson 6.26      Interval History   Cath yesterday, no flow limiting disease. Pt still c/o some resting chest discomfort that to her seems to start in the evenings, though she is c/o it this am.    Physical Exam   Temp: 97.6  F (36.4  C) Temp src: Oral BP: 169/87   Heart Rate: 83 Resp: 18 SpO2: 94 % O2 Device: None (Room air)    Vitals:    18 0632   Weight: 59.6 kg (131 lb 6.4 oz)     Vital Signs with Ranges  Temp:  [96.7  F (35.9  C)-97.7  F (36.5  C)] 97.6  F (36.4  C)  Heart Rate:  [58-83] 83  Resp:  [14-18] 18  BP: ()/(44-88) 169/87  SpO2:  [94 %-100 %] 94 %  I/O last 3 completed shifts:  In: 1132 [P.O.:590; I.V.:542]  Out: 2400 [Urine:2400]    Constitutional     alert and oriented, appears comfortable in bed on RA     Skin      warm and dry to touch    ENT     no pallor or cyanosis    Neck    Supple, JVP normal    Chest     no tenderness to palpation    Lungs  clear to auscultation, cough    Cardiac  regular rhythm, S1 normal, S2 normal    Abdomen     abdomen soft    Extremities and Back     No edema observed.  Bilateral ambutee. Right femoral access site has bruit, she does have bilateral PAD.      Neurological     no gross motor deficits noted, affect appropriate, oriented to time, person and place.    Medications     Medication given by intrathecal pump: This is NOT an order to dispense medication. For information only.         aspirin EC  81 mg Oral Daily     atorvastatin  40 mg Oral Daily     brimonidine  1 drop Right Eye BID     Calcium carb-Vitamin D 500 mg [place]-200 units  1 tablet Oral BID w/meals     diclofenac  2 g Topical 4x Daily     escitalopram  10 mg Oral Daily     estradiol  1 mg Oral Daily     fluticasone  1 spray Both Nostrils Daily     fluticasone-vilanterol  1 puff Inhalation Daily     insulin aspart  1-3 Units Subcutaneous TID AC     insulin aspart  1-3 Units Subcutaneous At Bedtime     iopamidol  80 mL INTRA-ARTERIAL Once     latanoprost  1 drop Both Eyes At Bedtime     levETIRAcetam  500 mg Oral BID     Lidocaine  3 patch Transdermal Q24H     lidocaine   Transdermal Q8H     lidocaine   Transdermal Q24h     lisinopril  10 mg Oral Daily     metFORMIN  500 mg Oral Daily with supper     metoprolol succinate  25 mg Oral Daily     nicotine  1 patch Transdermal Daily     nicotine   Transdermal Q8H     nicotine   Transdermal Daily     pantoprazole  40 mg Oral Daily     phenytoin  200 mg Oral BID     polyethylene glycol  17 g Oral Daily     pregabalin  150 mg Oral BID     risperiDONE  0.5 mg Oral At Bedtime     sodium chloride (PF)  3 mL Intracatheter Q8H     sodium chloride (PF)  3 mL Intracatheter Q8H     sucralfate  1 g Oral 4x Daily     traZODone  150 mg Oral At Bedtime     umeclidinium  1 puff Inhalation Daily        Data   Results for orders placed or performed during the hospital encounter of 18 (from the past 24 hour(s))   Potassium   Result Value Ref Range    Potassium 3.4 3.4 - 5.3 mmol/L   Magnesium   Result Value Ref Range    Magnesium 1.9 1.6 - 2.3 mg/dL   Troponin I - Now then in 4 hours x 2    Result Value Ref Range    Troponin I ES <0.015 0.000 - 0.045 ug/L   Glucose by meter   Result Value Ref Range    Glucose 90 70 - 99 mg/dL   Echocardiogram    Narrative    [id number]  [facility]  [id number]  [id number]^SASHA^TAHIRA^MAY           [facility]  Echocardiography Laboratory  [address] [place] [id number]        Name: SHARATH MERCEDES  MRN: [id number]  : 1950  Study Date: 2018 01:42 PM  Age: 68 yrs  Gender: Female  Patient Location: [facility]  Reason For Study: CAD  Ordering Physician: TAHIRA BAEZ  Referring Physician: ARBEN GRIDER  Performed By: Grazyna Garcia RDCS     BSA: 1.1 m2  Height: 39 in  Weight: 131 lb  HR: 65  BP: 93/49 mmHg  _____________________________________________________________________________  __        Procedure  Complete Echo Adult.  _____________________________________________________________________________  __        Interpretation Summary     Hyperdynamic left ventricular function  The visual ejection fraction is estimated at >70%.  No regional wall motion abnormalities noted.  Technically difficult, suboptimal study.  _____________________________________________________________________________  __        Left Ventricle  The left ventricle is normal in size. Proximal septal thickening is noted. The  visual ejection fraction is estimated at >70%. Hyperdynamic left ventricular  function. Grade I or early diastolic dysfunction. No regional wall motion  abnormalities noted.     Right Ventricle  Borderline right ventricular enlargement. The right ventricle is not well  visualized. The right ventricular systolic function is normal.     Atria  The left atrium  is mildly dilated. The left atrium is not well visualized.  Right atrium not well visualized.     Mitral Valve  The mitral valve is normal in structure and function. There is trace mitral  regurgitation. There is no mitral valve stenosis.        Tricuspid Valve  The tricuspid valve is not well visualized, but is grossly normal. Right  ventricular systolic pressure could not be approximated due to inadequate  tricuspid regurgitation.     Aortic Valve  The aortic valve is trileaflet with aortic valve sclerosis. No hemodynamically  significant valvular aortic stenosis.     Pulmonic Valve  The pulmonic valve is not well visualized. Normal pulmonic valve velocity.     Vessels  Normal size aorta. Inferior vena cava not well visualized for estimation of  right atrial pressure. The inferior vena cava is not dilated.     Pericardium  The pericardium is not well visualized.     _____________________________________________________________________________  __  MMode/2D Measurements & Calculations  IVSd: 0.77 cm  LVIDd: 4.4 cm  LVIDs: 3.2 cm  LVPWd: 0.83 cm  FS: 26.4 %  LV mass(C)d: 107.2 grams  LV mass(C)dI: 93.9 grams/m2     Ao root diam: 2.4 cm  LA dimension: 3.0 cm  asc Aorta Diam: 2.8 cm  LA/Ao: 1.3  LVOT diam: 2.1 cm  LVOT area: 3.6 cm2  LA Volume (BP): 43.0 ml  LA Volume Index (BP): 37.7 ml/m2  RWT: 0.38        Doppler Measurements & Calculations  MV E max leander: 75.2 cm/sec  MV A max leander: 55.8 cm/sec  MV E/A: 1.3  MV dec time: 0.26 sec     E/E' av.3  Lateral E/e': 6.0  Medial E/e': 10.7           _____________________________________________________________________________  __           Report approved by: Olivia Christy 2018 02:32 PM      Inpatient Coronary Angiography Adult Order    Narrative    LEFT HEART CATHETERIZATION AND IFR FLOW RESERVE 2018 3:34 PM     HISTORY: This is a 68-year-old patient who had previous stenting of  the right coronary. She presents with recurrent chest pain which  she  thought was similar to her previous angina. She ruled out for  myocardial infarction and heart catheterization was requested.    RESULTS:   Right coronary artery: The right coronary artery proximally is normal.  Shortly after the origin there is a long series of stents in the mid  right. There is then a small gap and then after that a small stent. In  the proximal stent there is narrowing up to 40-50% in two spots within  the body of the stent. In the second one the stent is not fully  expanded. We can see that on fluoroscopy but the narrowing appears to  be only 50% or less. At the end of the first stent and right before  the next stent which is at the acute margin of the heart there is an  area of the artery which is not stented. That area is 50-60% narrowed  and then in the distal RCA before the crux there is a 40% narrowing.  The PDA and posterolateral system are relatively normal. Of note there  is a small RV branch which looks like it drops down towards the  interventricular septum. It has subtle blush. The reason for the blush  is not clear. There is also blush more than we usually see in the  inferior wall in the vicinity of the posterolateral branch of the  right coronary artery.    I did choose to do IFR flow reserve of the right coronary artery  throughout all these lesions we gave intracoronary nitroglycerin first  the IFR was 1.0 and it was checked twice that it represents normal  flow.    Left main artery: The left main artery is short and normal.    Left anterior descending: The LAD has mild narrowing proximally, some  calcium and a 15-20% narrowing. The proximal to mid LAD after the  septal  has 25-35% narrowing. The mid to distal LAD is  normal. The diagonal vessel has a mild mid vessel disease under 35%.  The left circumflex in essence comprises one multibranching OM. There  is trivial plaque in the circumflex.    Left ventriculogram: The left ventricular size and systolic  function  is normal. Ejection fraction is estimated at 52-55%. LV pressure  136/18. No gradient on pullback. There is no mitral valve  insufficiency.    Procedures done under conscious sedation for 29 minutes. The patient  received 2 mg of Versed and 50 mcg of fentanyl. Heart rate, blood  pressure, oxygen saturation and patient responses monitored by the  nurse under my supervision. There were no complications. This result  was shared with the patient. There was no family present.    CONCLUSION:   1. Moderate in-stent renarrowing and additional disease in the right  coronary artery which does not appear to be flow-limiting. There is  mild disease in the left coronary system.  2. Normal ejection fraction.  3. Note is made of some blush from RV branch. One might want to  consider getting echocardiogram.    Glucose by meter   Result Value Ref Range    Glucose 96 70 - 99 mg/dL   Glucose by meter   Result Value Ref Range    Glucose 90 70 - 99 mg/dL   Glucose by meter   Result Value Ref Range    Glucose 96 70 - 99 mg/dL   Basic metabolic panel   Result Value Ref Range    Sodium 144 133 - 144 mmol/L    Potassium 3.8 3.4 - 5.3 mmol/L    Chloride 110 (H) 94 - 109 mmol/L    Carbon Dioxide 28 20 - 32 mmol/L    Anion Gap 6 3 - 14 mmol/L    Glucose 84 70 - 99 mg/dL    Urea Nitrogen 6 (L) 7 - 30 mg/dL    Creatinine 0.48 (L) 0.52 - 1.04 mg/dL    GFR Estimate >90 >60 mL/min/1.7m2    GFR Estimate If Black >90 >60 mL/min/1.7m2    Calcium 8.7 8.5 - 10.1 mg/dL   Glucose by meter   Result Value Ref Range    Glucose 87 70 - 99 mg/dL     *Note: Due to a large number of results and/or encounters for the requested time period, some results have not been displayed. A complete set of results can be found in Results Review.       Mariana Gutierrez, VICTOR M, CNP  Cardiology  Pager:  [phone]

## 2018-05-03 NOTE — PLAN OF CARE
Problem: Patient Care Overview  Goal: Plan of Care/Patient Progress Review  Outcome: Improving  PRIMARY DIAGNOSIS: CHEST PAIN  OUTPATIENT/OBSERVATION GOALS TO BE MET BEFORE DISCHARGE:  1. Ruled out acute coronary syndrome (negative or stable Troponin):  Yes  2. Pain Status: Pain free.  3. Appropriate provocative testing performed: Yes  - Stress Test Procedure: Echo  - Interpretation of cardiac rhythm per telemetry tech: NSR    4. Cleared by Consultants (if applicable):Yes  5. Return to near baseline physical activity: Yes  Discharge Planner Nurse   Safe discharge environment identified: Yes  Barriers to discharge: No       Entered by: Silvina Noriega 05/03/2018 12:48 PM     Please review provider order for any additional goals.   Nurse to notify provider when observation goals have been met and patient is ready for discharge.

## 2018-05-03 NOTE — PROGRESS NOTES
Care Transition Initial Assessment - JUANCARLOS  Reason For Consult: discharge planning  Met with: Patient    Active Problems:    * No active hospital problems. *       DATA  Lives With: alone  Living Arrangements: assisted living  Pt is wheelchair bound and usually has an electric wheel chair that is currently in for repair. Pt was in contact with repair company today and anticipates another week before chair will be ready.       Identified issues/concerns regarding health management: Pt was having pain in shoulder and chest area. Pt assessed by cardiology and it was determined that pain is not from any cardiac issues.  Pt anticipated needing a ride home. SW contacted [facility] and spoke with Radha. The earliest available transport time is at 5:00pm today.  Referral sent to [facility] who has been following pt for suprapubic cath care.  Pt requested a follow up with [facility].   SW checked with pt and she states she is able to get into the building and her apartment on her own when transported home  JUANCARLOS contacted the [place] RN line to update that pt is returning today between 5:30-6:00pm per pt request.  .  ASSESSMENT  Cognitive Status:  awake, alert and oriented  Concerns to be addressed: transportation home     PLAN  Financial costs for the patient includes N/A  Patient given options and choices for discharge pt wants to discharge back home  Patient/family is agreeable to the plan?  Yes:   Patient Goals and Preferences: home with [facility]  Patient anticipates discharging to: home via [facility] wheelchair at 5:00pm today.    LLOYD Dodge  [facility] Care Transitions  Phone: [phone]

## 2018-05-03 NOTE — PLAN OF CARE
Problem: Patient Care Overview  Goal: Plan of Care/Patient Progress Review  Outcome: Improving  PRIMARY DIAGNOSIS: CHEST PAIN  OUTPATIENT/OBSERVATION GOALS TO BE MET BEFORE DISCHARGE:  1. Ruled out acute coronary syndrome (negative or stable Troponin):  Yes  2. Pain Status: Pain free.  3. Appropriate provocative testing performed: Yes  - Stress Test Procedure: Echo  - Interpretation of cardiac rhythm per telemetry tech: NSR    4. Cleared by Consultants (if applicable):Yes  5. Return to near baseline physical activity: Yes  Discharge Planner Nurse   Safe discharge environment identified: Yes  Barriers to discharge: No       Entered by: Silvina Noriega 05/03/2018 2:18 PM     Please review provider order for any additional goals.   Nurse to notify provider when observation goals have been met and patient is ready for discharge.    Pt A&Ox4. VSS on room air. Total cares; wheelchair bound at baseline due to bilateral lower extremity amputation. Tele NSR. Complains of lower back pain with intermittent relief from PRN extra strength Tylenol once this shift. 3 Lidocaine patches applied to lower back. No complaints of SOB. Lung sound clear. Left arm Nicotine patch in place. Tolerating DD3 with thins diet. BG 87 and 86. Suprapubic catheter with adequate output. No bowel movement this shift. Right groin site covered; C/D/I. Right upper thigh/groin burn. Applied cream and covered. Cariology saw pt and signed off. Pt will discharge this evening back to [facility] via [facility] wheelchair transport at 1700. Nursing will continue to monitor.

## 2018-05-03 NOTE — PLAN OF CARE
Problem: Patient Care Overview  Goal: Plan of Care/Patient Progress Review  Outcome: No Change  PRIMARY DIAGNOSIS: ACUTE PAIN  OUTPATIENT/OBSERVATION GOALS TO BE MET BEFORE DISCHARGE:  1. Pain Status: Improved-controlled with oral pain medications.    2. Return to near baseline physical activity: Yes    3. Cleared for discharge by consultants (if involved): No    Discharge Planner Nurse   Safe discharge environment identified: No  Barriers to discharge: Yes       Entered by: Nuris Sweet 05/02/2018 10:12 PM     Please review provider order for any additional goals.   Nurse to notify provider when observation goals have been met and patient is ready for discharge.    VSS, pain managed with pain pump, lidocaine patch, tylenol PO. Shruthi pureed diet with assistance for meals. Legally blind, sensitive to light. Right groin site D/I. CPAP at . SW and PT to see pt wilbert 5/3/18.
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
## 2018-05-03 NOTE — PROGRESS NOTES
Pt has been on CPAP+8 30% throughout, alarm volume level set at 10. Will continue to follow.  5/3/2018  Laverne Arvizu

## 2018-05-03 NOTE — PLAN OF CARE
Problem: Patient Care Overview  Goal: Plan of Care/Patient Progress Review  Outcome: No Change  Pt alert and oriented.  AVSS except /72, 146/74. Extra strength tylenol given for pain.  Tele SR.  IVF dc'd.  Saline locked.  Right groin area CDI, no hematoma.  SP cath with good urine output.  Fall risk.  Bed alarm on.  BGM 96 at 0200.

## 2018-05-03 NOTE — PLAN OF CARE
Problem: Patient Care Overview  Goal: Plan of Care/Patient Progress Review  Outcome: Adequate for Discharge Date Met: 05/03/18  PRIMARY DIAGNOSIS: CHEST PAIN  OUTPATIENT/OBSERVATION GOALS TO BE MET BEFORE DISCHARGE:  1. Ruled out acute coronary syndrome (negative or stable Troponin):  Yes  2. Pain Status: Improved-controlled with oral pain medications.  3. Appropriate provocative testing performed: Yes  - Stress Test Procedure: Echo  - Interpretation of cardiac rhythm per telemetry tech: NSR    4. Cleared by Consultants (if applicable):Yes  5. Return to near baseline physical activity: Yes  Discharge Planner Nurse   Safe discharge environment identified: Yes  Barriers to discharge: No       Entered by: Nuris Sweet 05/03/2018 4:59 PM     Please review provider order for any additional goals.   Nurse to notify provider when observation goals have been met and patient is ready for discharge.  VSS, pain managed with pt's pain pump. Denies CP, SOB. Pt has been discharge May 3, 2018. Discharge instructions and education reviewed. Medications scheduled and follow up appts discussed. All belongings sent with pt.

## 2018-05-03 NOTE — PROGRESS NOTES
Pt was placed on CPAP of 8 and 30% of FIO2, alarm setting was set at 10, prn neb tx were given, BBS clear diminished, RT will continue to follow.     [place] hWaley, RT.

## 2018-05-03 NOTE — PROGRESS NOTES
05/03/18 1043   Quick Adds   Type of Visit Initial PT Evaluation   Living Environment   Lives With alone   Living Arrangements assisted living   Home Accessibility no concerns   Number of Stairs to Enter Home 0   Number of Stairs Within Home 0   Self-Care   Usual Activity Tolerance moderate   Current Activity Tolerance moderate   Regular Exercise no   Equipment Currently Used at Home wheelchair, manual;wheelchair, power  (power chair is being repaired, using her manual wc right now)   Activity/Exercise/Self-Care Comment Transfers by putting wc directly up against the bed and scooting into and out of it.   Functional Level Prior   Ambulation 1-->assistive equipment   Transferring 1-->assistive equipment   Fall history within last six months yes   Number of times patient has fallen within last six months 2   General Information   Onset of Illness/Injury or Date of Surgery - Date 05/02/18   Referring Physician Torie Sifuentes PA-C   Patient/Family Goals Statement Return home   Pertinent History of Current Problem (include personal factors and/or comorbidities that impact the POC) Admitted under observation status with chest pain, L arm pain. PMH: B AKA, multiple strokes, CAD, CHF, COPD, chronic pain, DM2, epilepsy, schizoaffective disorder.   Precautions/Limitations fall precautions   Weight-Bearing Status - LLE full weight-bearing   Weight-Bearing Status - RLE full weight-bearing   Cognitive Status Examination   Orientation orientation to person, place and time   Level of Consciousness alert   Follows Commands and Answers Questions 100% of the time;able to follow single-step instructions   Personal Safety and Judgment intact   Memory intact   Pain Assessment   Patient Currently in Pain Yes, see Vital Sign flowsheet  (L arm pain)   Posture    Posture Not impaired   Range of Motion (ROM)   ROM Comment B aka, all others WFL   Strength   Strength Comments B AKA, all others WFL   Bed Mobility   Bed Mobility Comments  "Supine to sit independent   Transfer Skills   Transfer Comments Transferred in and out of  independently and safely   Gait   Gait Comments NA   Balance   Balance Comments Balance steady and safe with transfers   General Therapy Interventions   Intervention Comments None needed   Clinical Impression   Criteria for Skilled Therapeutic Intervention evaluation only   Clinical Presentation Stable/Uncomplicated   Clinical Presentation Rationale medically stable   Clinical Decision Making (Complexity) Low complexity   Predicted Duration of Therapy Intervention (days/wks) eval only   Anticipated Discharge Disposition Home   Risk & Benefits of therapy have been explained Yes   Patient, Family & other staff in agreement with plan of care Yes   [facility] TM \"6 Clicks\"   2016, Trustees of [facility], under license to [employer].  All rights reserved.   6 Clicks Short Forms Basic Mobility Inpatient Short Form   Health system-[facility]  \"6 Clicks\" V.2 Basic Mobility Inpatient Short Form   1. Turning from your back to your side while in a flat bed without using bedrails? 4 - None   2. Moving from lying on your back to sitting on the side of a flat bed without using bedrails? 4 - None   3. Moving to and from a bed to a chair (including a wheelchair)? 4 - None   4. Standing up from a chair using your arms (e.g., wheelchair, or bedside chair)? 1 - Total   5. To walk in hospital room? 1 - Total   6. Climbing 3-5 steps with a railing? 1 - Total   Basic Mobility Raw Score (Score out of 24.Lower scores equate to lower levels of function) 15   Total Evaluation Time   Total Evaluation Time (Minutes) 15     "

## 2018-05-04 ENCOUNTER — TELEPHONE (OUTPATIENT)
Dept: INTERNAL MEDICINE | Facility: CLINIC | Age: 68
End: 2018-05-04

## 2018-05-04 ENCOUNTER — OFFICE VISIT (OUTPATIENT)
Dept: ENDOCRINOLOGY | Facility: CLINIC | Age: 68
End: 2018-05-04
Payer: COMMERCIAL

## 2018-05-04 ENCOUNTER — CARE COORDINATION (OUTPATIENT)
Dept: CARDIOLOGY | Facility: CLINIC | Age: 68
End: 2018-05-04

## 2018-05-04 VITALS — HEART RATE: 71 BPM | DIASTOLIC BLOOD PRESSURE: 80 MMHG | SYSTOLIC BLOOD PRESSURE: 135 MMHG

## 2018-05-04 DIAGNOSIS — E11.51 TYPE 2 DIABETES MELLITUS WITH DIABETIC PERIPHERAL ANGIOPATHY WITHOUT GANGRENE, WITHOUT LONG-TERM CURRENT USE OF INSULIN (H): Primary | ICD-10-CM

## 2018-05-04 NOTE — MR AVS SNAPSHOT
After Visit Summary   5/4/2018    Sonya Foote    MRN: [id number]           Patient Information     Date Of Birth          1950        Visit Information        Provider Department      5/4/2018 10:20 AM Michelle Irizarry MD [facility] Endocrinology        Care Instructions    Continue metformin  mg daily  Please follow up with primary doctor regularly for diabetes and bone density  Good luck for your move. It is pleasure to take of you all these years!    If you have any questions, please do not hesitate to call [phone] and ask for Endocrinology clinic.      After clinic hours, please contact [phone] and ask for Endocrinologist-on call    Sincerely,    Michelle Irizarry MD  Endocrinology            Follow-ups after your visit        Your next 10 appointments already scheduled     May 08, 2018 12:00 PM CDT   L/Vision Treatment with Manuela Mitchell OT   [facility] ([facility])    [address] [facility] [address]   [phone]            May 09, 2018 10:20 AM CDT   SHORT with Allan Casey MD   [facility]    [address] [id number] [phone]            May 15, 2018 11:00 AM CDT   L/Vision Treatment with Manuela Mitchell OT   [facility] ([facility])    [address] [facility] [address]   [phone]            May 17, 2018 12:40 PM CDT   LAB with REZA LAB   [facility] [place] ([facility])    [address] [place] [address] [phone]           Please do not eat 10-12 hours before your appointment if you are coming in fasting for labs on lipids, cholesterol, or glucose (sugar). This does not apply to pregnant women. Water, hot tea and black coffee (with nothing added) are okay. Do not drink other fluids, diet soda or  chew gum.            May 17, 2018  1:30 PM CDT   Return Discharge with VICTOR M Castellano CNP   [facility] ([facility])    [address]  [facility] [address]   [phone] OPT 2            May 29, 2018 11:00 AM CDT   L/Vision Treatment with Manuela Mitchell OT   [place] [facility] ([facility])    [address] [facility] [address] [phone]            Jun 04, 2018  9:30 AM CDT   (Arrive by 9:15 AM)   Return Visit with Alina Jennings MD   [facility] ([facility])    [address]  [address]  [facility] [address] [phone]            Jun 26, 2018 10:45 AM CDT   Return Visit with Bj Anderson MD   [facility] ([facility])    [id number]  [facility] [address] [phone] OPT 2            Jul 26, 2018 10:15 AM CDT   RETURN GLAUCOMA with Marely Robin MD   [facility] ([facility])    [address] [address] [phone]              Who to contact     Please call your clinic at [phone] to:    Ask questions about your health    Make or cancel appointments    Discuss your medicines    Learn about your test results    Speak to your doctor            Additional Information About Your Visit        CaptiveMotionhart Information     Level Chef is an electronic gateway that provides easy, online access to your medical records. With Level Chef, you can request a clinic appointment, read your test results, renew a prescription or communicate with your care team.     To sign up for Level Chef visit the website at [email or web address]   You will be asked to enter the access code listed below, as well as some personal information. Please follow the directions to create your  username and password.     Your access code is: [email or web address]  Expires: 2018 11:08 AM     Your access code will  in 90 days. If you need help or a new code, please contact your [facility] or call [phone] for assistance.        Care EveryWhere ID     This is your Care EveryWhere ID. This could be used by other organizations to access your [place] medical records  [id number]        Your Vitals Were     Pulse                   71            Blood Pressure from Last 3 Encounters:   18 135/80   18 153/77   18 (!) 157/95    Weight from Last 3 Encounters:   18 59.6 kg (131 lb 6.4 oz)   18 55.8 kg (123 lb)   18 55.8 kg (123 lb)              Today, you had the following     No orders found for display         Today's Medication Changes          These changes are accurate as of 18 10:21 AM.  If you have any questions, ask your nurse or doctor.               These medicines have changed or have updated prescriptions.        Dose/Directions    Phenytoin Sodium Extended 200 MG Caps   This may have changed:  additional instructions   Used for:  Seizure disorder (H)        Dose:  200 mg   Take 200 mg by mouth 2 times daily   Quantity:  60 capsule   Refills:  0                Primary Care Provider Office Phone # Fax #    Allan Casey [phone]       [address] [facility] [address]        Equal Access to Services     [facility] Center[facility] AH: Hadii shaheen Cohen, waaxda newton, qaybta kaalmawei encarnacion, tonie marroquin. So [facility] [phone].    ATENCIÓN: Si habla español, tiene a briones disposición servicios gratuitos de asistencia lingüística. Llame al [phone].    We comply with applicable federal civil rights laws and [place] laws. We do not discriminate on the basis of race, color, national origin, age, disability, sex, sexual orientation, or gender identity.            Thank you!      Thank you for choosing [facility] ENDOCRINOLOGY  for your care. Our goal is always to provide you with excellent care. Hearing back from our patients is one way we can continue to improve our services. Please take a few minutes to complete the written survey that you may receive in the mail after your visit with us. Thank you!             Your Updated Medication List - Protect others around you: Learn how to safely use, store and throw away your medicines at www.disposemymeds.org.          This list is accurate as of 5/4/18 10:21 AM.  Always use your most recent med list.                   Brand Name Dispense Instructions for use Diagnosis    ACETAMINOPHEN PO      Take 1,000 mg by mouth every 4 hours as needed for fever Not to exceed 4000 mg/day        ADVAIR DISKUS 250-50 MCG/DOSE diskus inhaler   Generic drug:  fluticasone-salmeterol     60 Inhaler    Inhale 1 puff into the lungs 2 times daily    Acute bronchitis       * albuterol (2.5 MG/3ML) 0.083% neb solution     360 mL    INHALE 1 VIAL VIA NEBULIZER EVERY 6 HOURS AS NEEDED    Chronic obstructive pulmonary disease, unspecified COPD type (H)       * VENTOLIN  (90 Base) MCG/ACT Inhaler   Generic drug:  albuterol     3 Inhaler    Inhale 2 puffs into the lungs every 6 hours as needed for shortness of breath / dyspnea or wheezing    Chronic obstructive pulmonary disease, unspecified COPD type (H)       aspirin 81 MG EC tablet     90 tablet    Take 1 tablet (81 mg) by mouth daily    Unstable angina (H)       atorvastatin 40 MG tablet    LIPITOR    90 tablet    Take 1 tablet (40 mg) by mouth daily    Hyperlipidemia LDL goal <100       blood glucose monitoring lancets     100 each    Use to test blood sugar 3 times daily or as directed.    Type 2 diabetes mellitus with diabetic peripheral angiopathy without gangrene, without long-term current use of insulin (H)       blood glucose monitoring meter device kit     1 kit    Use to test blood sugars 3 times daily  or as directed.    Type 2 diabetes, HbA1C goal < 8% (H)       blood glucose monitoring test strip    ONETOUCH ULTRA    3 Box    Use to test blood sugars 3 times daily or as directed.    Type 2 diabetes mellitus with diabetic peripheral angiopathy without gangrene, without long-term current use of insulin (H)       brimonidine 0.2 % ophthalmic solution    ALPHAGAN    1 Bottle    Place 1 drop into the right eye 2 times daily    Primary open angle glaucoma of both eyes, severe stage       calcium citrate-vitamin D 315-250 MG-UNIT Tabs per tablet    CITRACAL    120 tablet    Take 2 tablets by mouth daily    Osteoporosis       cetirizine 10 MG tablet    zyrTEC    90 tablet    Take 1 tablet (10 mg) by mouth daily as needed for allergies    Seasonal allergic rhinitis, unspecified allergic rhinitis trigger       CYANOCOBALAMIN PO      Take 2,000 mcg by mouth daily        diclofenac 1 % Gel topical gel    VOLTAREN    100 g    Apply 2 g topically 4 times daily to hands    Primary osteoarthritis of both hands       dorzolamide 2 % ophthalmic solution    TRUSOPT    1 Bottle    Place 1 drop into the right eye 2 times daily    Primary open angle glaucoma of both eyes, severe stage       EPINEPHrine 0.3 MG/0.3ML injection 2-pack    EPIPEN/ADRENACLICK/or ANY BX GENERIC EQUIV    0.6 mL    Inject 0.3 mLs (0.3 mg) into the muscle as needed for anaphylaxis    Bee sting reaction, undetermined intent, subsequent encounter       escitalopram 10 MG tablet    LEXAPRO     Take 10 mg by mouth daily        ESTRACE VAGINAL 0.1 MG/GM cream   Generic drug:  estradiol     42.5 g    USE DIME SIZE AMOUNT 3X A WEEK AT NIGHT    Atrophic vaginitis       estradiol 1 MG tablet    ESTRACE    90 tablet    Take 1 tablet (1 mg) by mouth daily    Person who has had sex change operation       ferrous sulfate 325 (65 Fe) MG tablet    IRON    60 tablet    Take 1 tablet (325 mg) by mouth 2 times daily With meals        fluticasone 50 MCG/ACT spray    FLONASE      Spray 1 spray into both nostrils daily        guaiFENesin-codeine 100-10 MG/5ML Soln solution    ROBITUSSIN AC    120 mL    Take 5 mLs by mouth every 4 hours as needed for cough    Upper respiratory tract infection, unspecified type       hydrochlorothiazide 12.5 MG capsule    MICROZIDE    90 capsule    Take 1 capsule (12.5 mg) by mouth daily    Essential hypertension with goal blood pressure less than 130/80       INCRUSE ELLIPTA 62.5 MCG/INH oral inhaler   Generic drug:  umeclidinium      Inhale 1 puff into the lungs daily        lactulose 10 GM/15ML solution    CHRONULAC     Take 20 g by mouth as needed for constipation        latanoprost 0.005 % ophthalmic solution    XALATAN    2.5 mL    Place 1 drop into both eyes At Bedtime    Primary open angle glaucoma of both eyes, severe stage       levETIRAcetam 500 MG tablet    KEPPRA    180 tablet    Take 1 tablet (500 mg) by mouth 2 times daily    Nausea       lidocaine      Place onto the skin At Bedtime        lidocaine 5 % Patch    LIDODERM    30 patch    APPLY 1 TO 3 PATCHES TO PAINFUL AREA AT ONCE FOR UP TO 12 HOURS WITHIN A 24 HOUR PERIOD REMOVE AFTER 12 HOURS    Chronic pain syndrome, 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 (H)       lisinopril 20 MG tablet    PRINIVIL/ZESTRIL    90 tablet    Take 1 tablet (20 mg) by mouth daily    History of coronary artery disease       MEDICATION GIVEN BY INTRATHECAL PUMP - INSTRUCTION      continuous 2/8/18: Pump managed by [facility] in [place] [phone].  Conc: Bupivacaine 20 mg/mL and Fentanyl 2000 mcg/mL, morphine 2 mg/mL Continuous:  Fentanyl 796 mcg/day, Bupivacaine 7.96 mg/day, Morphine 0.796 mg/day  Boluses: Up to 7 boluses per 24-hr period of Bupivicaine 0.5994 mg and Fentanyl 59.9 mcg morphine 0.0599 mg. Pump Refill Date 02/28/18        metFORMIN 500 MG 24 hr tablet    GLUCOPHAGE-XR    90 tablet    Take 1 tablet (500 mg) by mouth daily (with dinner)    Type 2  diabetes mellitus with diabetic peripheral angiopathy and gangrene, without long-term current use of insulin (H)       metoprolol succinate 50 MG 24 hr tablet    TOPROL-XL    90 tablet    Take 1 tablet (50 mg) by mouth daily    History of coronary artery disease       MULTIVITAMIN PO      Take 1 tablet by mouth daily        nicotine 14 MG/24HR 24 hr patch    NICODERM CQ    30 patch    Place 1 patch onto the skin daily    Tobacco dependence syndrome       nitroGLYcerin 0.4 MG sublingual tablet    NITROSTAT    25 tablet    Place 1 tablet (0.4 mg) under the tongue every 5 minutes as needed for chest pain if you are still having symptoms after 3 doses (15 minutes) call 911.    Chronic systolic congestive heart failure (H), Old myocardial infarction       ondansetron 8 MG ODT tab    ZOFRAN-ODT    30 tablet    Take 1 tablet (8 mg) by mouth every 8 hours as needed    Other migraine without status migrainosus, not intractable       order for DME     1 Month    Equipment being ordered: Depends briefs    Incontinence       order for DME     1 Device    Equipment being ordered: Power Wheelchair    CVA (cerebral infarction), HTN (hypertension)       order for DME     1 Box    Equipment being ordered: Glucerna daily shakes with each meal    Type 2 diabetes mellitus with other diabetic neurological complication       * order for DME     1 Units    Equipment being ordered: Nebulizer and tubing supplies    Simple chronic bronchitis (H)       * order for DME     1 Device    Equipment being ordered: CPAP supplies mask, hose, filters, cushion fax to [facility] at [phone] Disp: 10 DeviceRefills: prn Class: Local PrintStart: 2/10/2017    Chronic obstructive pulmonary disease, unspecified COPD type (H)       * order for DME     10 Device    Equipment being ordered: CPAP supplies mask, hose, filters, cushion  fax to [facility] at [phone]    COPD (chronic obstructive pulmonary disease) (H)       * order for DME     1  Device    Hospital bed with side rails    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 (H)       * order for DME     1 Device    Full electric hospital bed with half rails  Dx: J43367, I110, J449 Length of need: lifetime    Status post bilateral above knee amputation (H)       * order for DME     1 Device    Wheel Chair Cushion: 18 x 18 inch Roho cushion    Status post bilateral above knee amputation (H)       order for DME     1 Package    Equipment being ordered: bandage tape, prefer paper    Burn of skin       pantoprazole 40 MG EC tablet    PROTONIX    30 tablet    Take 1 tablet (40 mg) by mouth daily    Gastroesophageal reflux disease without esophagitis       Phenytoin Sodium Extended 200 MG Caps     60 capsule    Take 200 mg by mouth 2 times daily    Seizure disorder (H)       polyethylene glycol Packet    MIRALAX/GLYCOLAX     Take 17 g by mouth daily Dissolved in water or juice        pregabalin 75 MG capsule    LYRICA    120 capsule    Take 2 capsules (150 mg) by mouth 2 times daily    Pain in both upper extremities       PROCHLORPERAZINE MALEATE PO      Take 10 mg by mouth every 8 hours as needed for nausea or vomiting        risperiDONE 0.5 MG tablet    risperDAL     Take 0.5 mg by mouth At Bedtime        silver sulfADIAZINE 1 % cream    SILVADENE    50 g    Apply topically 2 times daily    Burn       sucralfate 1 GM tablet    CARAFATE    120 tablet    Take 1 tablet (1 g) by mouth 4 times daily May dissolve in 10 mL water is necessary. (Start upon completion of carafate suspension)    Adjustment disorder with depressed mood       traZODone 50 MG tablet    DESYREL     Take 150 mg by mouth At Bedtime        vitamin D 2000 units tablet     100 tablet    Take 2,000 Units by mouth daily.        zinc 50 MG Tabs      Take 1 tablet by mouth daily        * Notice:  This list has 8 medication(s) that are the same as other medications prescribed for you. Read the directions carefully, and ask your  doctor or other care provider to review them with you.

## 2018-05-04 NOTE — TELEPHONE ENCOUNTER
"Hospital/TCU/ED for chronic condition Discharge Protocol    \"Hi, my name is Jessica Pineda, a registered nurse, and I am calling from [facility].  I am calling to follow up and see how things are going for you after your recent emergency visit/hospital/TCU stay.\"    Tell me how you are doing now that you are home?\" Patient stated, \"I'm doing alright.\"  Denies questions or concerns.        Discharge Instructions    \"Let's review your discharge instructions.  What is/are the follow-up recommendations?  Pt. Response: Follow up with cardiology (scheduled on 5/17) and Dr. Casey    \"Has an appointment with your primary care provider been scheduled?\"   Yes. (confirm) appointment on 5/9    \"When you see the provider, I would recommend that you bring your medications with you.\"    Medications    \"Tell me what changed about your medicines when you discharged?\"    Changes to chronic meds?    2 or more - Epic MTM referral needed    \"What questions do you have about your medications?\"    None     New diagnoses of heart failure, COPD, diabetes, or MI?    No              Medication reconciliation completed? Yes  Was MTM referral placed (*Make sure to put transitions as reason for referral)?   Yes - \"The Medication Therapy  will call you within the next few days to schedule an appointment with an MTM pharmacist to discuss your medicines and make sure they are working as well as they possibly can for you. This visit can be done in a [facility] clinic or on the phone and is at no charge to you.\"    Call Summary    \"What questions or concerns do you have about your recent visit and your follow-up care?\"     none    \"If you have questions or things don't continue to improve, we encourage you contact us through the main clinic number (give number).  Even if the clinic is not open, triage nurses are available 24/7 to help you.     We would like you to know that our clinic has extended hours (provide " "information).  We also have urgent care (provide details on closest location and hours/contact info)\"      \"Thank you for your time and take care!\"             "

## 2018-05-04 NOTE — PROGRESS NOTES
Patient was evaluated by cardiology while inpatient for chest pain and underwent coronary angiogram that did not require any mechanical intervention. Called patient to discuss any post hospital d/c questions she may have, review medication changes, and confirm f/u appts.Patient denied any questions regarding new medications or changes to some of her current medications that she was taking prior to admission. Patient denied any increase in SOB or chest pain. RN confirmed with patient that she has an apt scheduled on 5/17/18 with KENNY Shaneka Gutierrez (1240 lab and 130 with KENNY). Patient advised to call clinic with any cardiac related questions or concerns prior to her apt on 5/17/18. Patient verbalized understanding and agreed with plan.

## 2018-05-04 NOTE — NURSING NOTE
Patient presents today for f/u for diabetes.  Unable to measure height and weight due to patient being in a wheelchair.    Patient forgot to bring her glucose meter today.      Arlen Trivedi, CMA

## 2018-05-04 NOTE — LETTER
5/4/2018        RE: Sonya Foote  [address]  [place] [facility] [id number]     Dear Colleague,    Thank you for referring your patient, Sonya Foote, to the [facility] ENDOCRINOLOGY at [facility]. Please see a copy of my visit note below.         Endocrinology Note         Sonya is a 68 year old female presents today for type 2 diabetes    HPI  Sonya Foote is a 68 years old female who has extensive medical history including type 2 diabetes with neuropathy, s/p bilateral above knee amputation, stroke x3, coronary artery disease s/p stents, CHF, left carotid artery stenosis s/p stent, peripheral artery disease s/p stents and frequent fall who is here today to follow up type 2 diabetes.     She has been diagnosed with type 2 diabetes for 10 years. Prior to that, she was prediabetes for many years.     Interval history:  Last visit with me was in 11/2017. She did have atypical chest pain secondary to hypertension admitted observation 2 days ago.   She is now wheelchair bound but working with physical therapist for transferring her place to place. She is currently living at assisted living and the nurse prepared all medication and checked blood glucose for her.    1) type 2 diabetes with micro and macrovascular complications: A1C on 5/2/0/208 was 4.4 (Hb of 11.4). She does not bring glucose meter today but reports BG in 160 ranges without hypoglycemia. She is currently living at assisted living. She said that she likes to have eat pasta, pizza and lasanga.    DM complications:  Retinopathy: right eye - glaucoma, left eye partial blindness   Nephropathy: Cr 0.48 (5/3/2018), microalbumin normal (2/2016)  Neuropathy: numbness and tingling bilateral feet and hands - on gabapentin  On ASA and plavix  On atorvastatin 40 mg, LDL 77 (6/6/16)    2) Osteoporosis. DXA in 11/2012 showed T-score of -2.6 (BMD 0.684) at left femoral neck. DXA in 7/2015 showed lumbar spine (L1-L4) T-score:  -2.4 degenerative and/or osteosclerotic changes are present, falsely improving result and left femoral neck T-score was -1.9, right femoral neck T-score was -2.2. She was on alendronate 70 mg week from 2012 to mid 2017. She takes 2000 IU of vitamin D3. She has cheese regularly.     Past Medical History  Past Medical History:   Diagnosis Date     Anemia      Antiplatelet or antithrombotic long-term use      Arthritis      AS (sickle cell trait) (H) 10/8/2013     Blind left eye      BPPV (benign paroxysmal positional vertigo)      Chronic back pain      COPD (chronic obstructive pulmonary disease) (H)      Coronary artery disease      CVA (cerebral infarction) 10/8/2012    x3, residual left sided weakness     Diastolic CHF (H) 9/4/2012     Dilantin toxicity 5/31/2013     Esophageal candidiasis (H)      GERD (gastroesophageal reflux disease)      Heart attack      Hernia, abdominal      History of blood transfusion     x5     History of thrombophlebitis      HTN (hypertension) 9/4/2012     Hyperlipidemia LDL goal <100 9/4/2012     Legally blind in right eye, as defined in [place]     No periphal vision right eye     Osteoporosis 1/21/2013     Other chronic pain      PAD (peripheral artery disease) (H)      Palpitations      Person who has had sex change operation 1/20/2014     Pneumonia      Schizoaffective disorder (H)      Seizure disorder (H) 9/4/2012     Sleep apnea     CPAP     Thrombosis of leg      Type 2 diabetes, HbA1C goal < 8% (H) 9/4/2012     Uncomplicated asthma      Unspecified cerebral artery occlusion with cerebral infarction    - hx of UGI bleeding secondary to severe esophagitis and gastric ulcer  - Right common femoral artery, SFA, and popliteal artery balloon athrectomy and angioplasty and right SFA stent placement 9/14/15.  - bilateral above knee amputation    Allergies  Allergies   Allergen Reactions     Bee Venom      Penicillins Anaphylaxis     Other reaction(s): Skin Rash and/or Hives     Dilantin  [Phenytoin] Other (See Comments)     Generic dilantin only per pt     Iodide Hives     09/11/15: Pt states she can use iodine and doesn't have any problems      Iodine-131      Novocaine [Procaine] Hives     Other reaction(s): Skin Rash and/or Hives     Tositumomab      Medications  Current Outpatient Prescriptions   Medication Sig Dispense Refill     ACETAMINOPHEN PO Take 1,000 mg by mouth every 4 hours as needed for fever Not to exceed 4000 mg/day       ADVAIR DISKUS 250-50 MCG/DOSE diskus inhaler Inhale 1 puff into the lungs 2 times daily 60 Inhaler 11     albuterol (2.5 MG/3ML) 0.083% neb solution INHALE 1 VIAL VIA NEBULIZER EVERY 6 HOURS AS NEEDED 360 mL 11     aspirin EC 81 MG EC tablet Take 1 tablet (81 mg) by mouth daily 90 tablet 3     atorvastatin (LIPITOR) 40 MG tablet Take 1 tablet (40 mg) by mouth daily 90 tablet 2     blood glucose monitoring (ONE TOUCH ULTRA 2) meter device kit Use to test blood sugars 3 times daily or as directed. 1 kit 0     blood glucose monitoring (ONE TOUCH ULTRA) test strip Use to test blood sugars 3 times daily or as directed. 3 Box 3     blood glucose monitoring (ONE TOUCH ULTRASOFT) lancets Use to test blood sugar 3 times daily or as directed. 100 each PRN     brimonidine (ALPHAGAN) 0.2 % ophthalmic solution Place 1 drop into the right eye 2 times daily 1 Bottle 11     calcium citrate-vitamin D (CITRACAL) 315-250 MG-UNIT TABS Take 2 tablets by mouth daily 120 tablet 5     cetirizine (ZYRTEC) 10 MG tablet Take 1 tablet (10 mg) by mouth daily as needed for allergies 90 tablet 3     Cholecalciferol (VITAMIN D) 2000 UNITS tablet Take 2,000 Units by mouth daily. 100 tablet 3     CYANOCOBALAMIN PO Take 2,000 mcg by mouth daily       diclofenac (VOLTAREN) 1 % GEL topical gel Apply 2 g topically 4 times daily to hands 100 g 11     dorzolamide (TRUSOPT) 2 % ophthalmic solution Place 1 drop into the right eye 2 times daily 1 Bottle 11     EPINEPHrine (EPIPEN/ADRENACLICK/OR ANY BX  GENERIC EQUIV) 0.3 MG/0.3ML injection 2-pack Inject 0.3 mLs (0.3 mg) into the muscle as needed for anaphylaxis 0.6 mL 1     escitalopram (LEXAPRO) 10 MG tablet Take 10 mg by mouth daily        ESTRACE VAGINAL 0.1 MG/GM cream USE DIME SIZE AMOUNT 3X A WEEK AT NIGHT 42.5 g 11     estradiol (ESTRACE) 1 MG tablet Take 1 tablet (1 mg) by mouth daily 90 tablet 3     ferrous sulfate (IRON) 325 (65 FE) MG tablet Take 1 tablet (325 mg) by mouth 2 times daily With meals 60 tablet 2     fluticasone (FLONASE) 50 MCG/ACT nasal spray Spray 1 spray into both nostrils daily       guaiFENesin-codeine (ROBITUSSIN AC) 100-10 MG/5ML SOLN solution Take 5 mLs by mouth every 4 hours as needed for cough 120 mL 0     hydrochlorothiazide (MICROZIDE) 12.5 MG capsule Take 1 capsule (12.5 mg) by mouth daily 90 capsule 2     lactulose (CHRONULAC) 10 GM/15ML solution Take 20 g by mouth as needed for constipation       latanoprost (XALATAN) 0.005 % ophthalmic solution Place 1 drop into both eyes At Bedtime 2.5 mL 11     levETIRAcetam (KEPPRA) 500 MG tablet Take 1 tablet (500 mg) by mouth 2 times daily 180 tablet 1     lidocaine (LIDODERM) 5 % Patch APPLY 1 TO 3 PATCHES TO PAINFUL AREA AT ONCE FOR UP TO 12 HOURS WITHIN A 24 HOUR PERIOD REMOVE AFTER 12 HOURS 30 patch 5     lidocaine patch REMOVAL Place onto the skin At Bedtime       lisinopril (PRINIVIL/ZESTRIL) 20 MG tablet Take 1 tablet (20 mg) by mouth daily 90 tablet 0     MEDICATION GIVEN BY INTRATHECAL PUMP - INSTRUCTION continuous 2/8/18: Pump managed by [facility] in [place] [phone].   Conc: Bupivacaine 20 mg/mL and Fentanyl 2000 mcg/mL, morphine 2 mg/mL  Continuous:  Fentanyl 796 mcg/day, Bupivacaine 7.96 mg/day, Morphine 0.796 mg/day   Boluses: Up to 7 boluses per 24-hr period of Bupivicaine 0.5994 mg and Fentanyl 59.9 mcg morphine 0.0599 mg.  Pump Refill Date 02/28/18       metFORMIN (GLUCOPHAGE-XR) 500 MG 24 hr tablet Take 1 tablet (500 mg) by mouth  daily (with dinner) 90 tablet 3     metoprolol succinate (TOPROL-XL) 50 MG 24 hr tablet Take 1 tablet (50 mg) by mouth daily 90 tablet 0     Multiple Vitamin (MULTIVITAMIN OR) Take 1 tablet by mouth daily        nicotine (NICODERM CQ) 14 MG/24HR 24 hr patch Place 1 patch onto the skin daily 30 patch 0     nitroglycerin (NITROSTAT) 0.4 MG SL tablet Place 1 tablet (0.4 mg) under the tongue every 5 minutes as needed for chest pain if you are still having symptoms after 3 doses (15 minutes) call 911. 25 tablet 1     ondansetron (ZOFRAN-ODT) 8 MG ODT tab Take 1 tablet (8 mg) by mouth every 8 hours as needed 30 tablet 5     ORDER FOR DME Equipment being ordered: Depends briefs 1 Month 11     ORDER FOR DME Equipment being ordered: Power Wheelchair 1 Device 0     order for DME Equipment being ordered: Glucerna daily shakes with each meal 1 Box 11     order for DME Equipment being ordered: Nebulizer and tubing supplies 1 Units 0     order for DME Equipment being ordered: CPAP supplies mask, hose, filters, cushion    fax to [facility] at [phone] 10 Device prn     order for DME Equipment being ordered: CPAP supplies mask, hose, filters, cushion fax to [facility] at [phone]  Disp: 10 Device Refills: prn   Class: Local Print Start: 2/10/2017 1 Device 0     order for DME Hospital bed with side rails 1 Device 0     order for DME Full electric hospital bed with half rails    Dx: J65186, I110, J449  Length of need: lifetime 1 Device 0     order for DME Wheel Chair Cushion: 18 x 18 inch Roho cushion 1 Device 0     order for DME Equipment being ordered: bandage tape, prefer paper 1 Package 0     pantoprazole (PROTONIX) 40 MG EC tablet Take 1 tablet (40 mg) by mouth daily 30 tablet 5     phenytoin 200 MG CAPS Take 200 mg by mouth 2 times daily (Patient taking differently: Take 200 mg by mouth 2 times daily (takes at 8 AM and 8 PM)) 60 capsule 0     polyethylene glycol (MIRALAX/GLYCOLAX) Packet Take 17 g by mouth daily  Dissolved in water or juice        pregabalin (LYRICA) 75 MG capsule Take 2 capsules (150 mg) by mouth 2 times daily 120 capsule 5     PROCHLORPERAZINE MALEATE PO Take 10 mg by mouth every 8 hours as needed for nausea or vomiting       risperiDONE (RISPERDAL) 0.5 MG tablet Take 0.5 mg by mouth At Bedtime       silver sulfADIAZINE (SILVADENE) 1 % cream Apply topically 2 times daily 50 g 0     sucralfate (CARAFATE) 1 GM tablet Take 1 tablet (1 g) by mouth 4 times daily May dissolve in 10 mL water is necessary. (Start upon completion of carafate suspension) 120 tablet 11     traZODone (DESYREL) 50 MG tablet Take 150 mg by mouth At Bedtime        umeclidinium (INCRUSE ELLIPTA) 62.5 MCG/INH oral inhaler Inhale 1 puff into the lungs daily       VENTOLIN  (90 BASE) MCG/ACT Inhaler Inhale 2 puffs into the lungs every 6 hours as needed for shortness of breath / dyspnea or wheezing 3 Inhaler 5     zinc 50 MG TABS Take 1 tablet by mouth daily       Family History reviwed  Strong family history of type 2 diabetes in both parents, grandparents, cousin and sister  Stroke and heart disease in her parents    Social History - reviewed  Social History   Substance Use Topics     Smoking status: Former Smoker     Packs/day: 2.50     Years: 30.00     Types: Cigarettes, Cigars     Quit date: 11/1/2001     Smokeless tobacco: Never Used     Alcohol use No     Now on disability  Stay at assisted living facility with her , has home care nurse visit once a week    ROS  Constitutional: stable weight, +fatigue  Eyes: partial blindness right and left eyes, poor eyes sight, could not see well particularly at night.  Cardiovascular: no chest pain, palpitations  Respiratory: no dyspnea, cough, shortness of breath  GI: no nausea, vomiting, intermittent diarrhea, no constipation, no abdominal pain   : no change in urine, no dysuria or hematuria  Musculoskeletal: tingling and numbness in her feet and hands  Integumentary: no concerning  lesions   Neuro: +numbness in both feet and hands, no tremor, no dizziness  Endo: +sensitive to both hot and cold weather   Heme/Lymph: +UGI bleeding from severe esophagitis and gastritis 9/17/16  Allergy: no environmental allergies   Psych: could not sleep well due to feet pain, and back pain    Physical Exam  /80  Pulse 71  There is no height or weight on file to calculate BMI.  Constitutional: no distress, comfortable, pleasant   Eyes: anicteric  Musculoskeletal: no edema, bilateral AKA amputation  Skin: no jaundice   Neurological: no tremor, in the motorized wheelchair due to leg and back pain  Psychological: appropriate mood       RESULTS  A1c 4.4 5/2/2018  A1C 5.1 11/10/17  A1C 4.8 5/12/17  A1C 5.0 9/30/16  A1C      4.9   6/6/2016  A1C 5.2 2/5/2016  A1C  4.8  8/11/2015  A1C  5.1 6/30/15  A1C      5.3  1/20/15  A1C      4.8  10/21/14  A1C      5.2   4/1/2014  A1C      5.4   11/9/2013  A1C      5.2   10/29/2013  A1C      4.7   5/17/2013  A1C      4.9   3/13/2013    ENDO DIABETES Latest Ref Rng 2/5/2016   MICROALBUMIN URINE  6   MICROALBUMIN MG/G CR 0 - 25 mg/g Cr 10.72     ENDO DIABETES Latest Ref Rng 6/6/2016   CHOLESTEROL <200 mg/dL 155   LDL CHOLESTEROL, CALCULATED <100 mg/dL 77   HDL CHOLESTEROL >49 mg/dL 65   VLDL-CHOLESTEROL 0 - 30 mg/dL    NON HDL CHOLESTEROL <130 mg/dL 89   TRIGLYCERIDES <150 mg/dL 62     DXA 7/3/15  Lumbar Spine (L1-L4) T-score: -2.4 Degenerative and/or osteosclerotic changes are present, falsely improving result.   Left Femoral Neck T-score: -1.9  Right Femoral Neck T-score: -2.2    Lumbar (L1-L4) BMD: 0.900 Previous: 0.948   Total Hip Mean BMD: 0.739 Previous: 0.684    Patient has had a previous DXA performed on a different Sensitive Object machine on 11/12. Unfortunately, method for data collection at that time is not adequate for comparison.    IMPRESSION  Osteoporosis (based on T-score and degenerative changes)  Degenerative changes of the spine    ASSESSMENT:    Sonya Foote is a 68  years old female who has multiple medical history mainly type 2 diabetes, strokex3, coronary artery disease, left carotid artery stenosis s/p stent, peripheral artery disease s/p stent and bilateral AKA amputation and frequent fall. She is here today to follow up type 2 diabetes.    1) type 2 diabetes with peripheral neuropathy and macrovascular complications (PVD, CAD, CVA):  A1C today is 4.4% with Hb 11.4.  - continue metformin  mg with dinner.    2) osteoporosis: diagnosed based on DXA in 2012, T-score of -2.6 at left femoral neck. She has been on alendronate 70 mg weekly from 2012 to mid 2017. Repeated DXA 7/2015 showed improving of hip BMD.   - re-evaluate with DXA this year.  - continue with calcium and vitamin D intake.    3) hx of stroke, CAD, peripheral artery disease s/p stent and left AKA    4) hx of upper GI bleeding due to esophagitis and gastritis    5) uncontrolled hypertension: recommend to reduce salt.    PLAN:   - continue metfomrin  mg daily  - continue with diet modification   - advise on cutting down high carbohydrate diet   - continue to check blood glucose 1-2 times per day  - recommend for DXA this year -- deferred PCP  - she can follow up with PCP for diabetes and bone density  - pt will move to [place], [place] in 2 months    Michelle Irizarry MD  Endocrinology  [phone]    CC: Dr.Mark Casey

## 2018-05-08 ENCOUNTER — ALLIED HEALTH/NURSE VISIT (OUTPATIENT)
Dept: PHARMACY | Facility: CLINIC | Age: 68
End: 2018-05-08
Attending: INTERNAL MEDICINE
Payer: COMMERCIAL

## 2018-05-08 ENCOUNTER — PATIENT OUTREACH (OUTPATIENT)
Dept: GERIATRIC MEDICINE | Facility: CLINIC | Age: 68
End: 2018-05-08

## 2018-05-08 ENCOUNTER — HOSPITAL ENCOUNTER (OUTPATIENT)
Dept: OCCUPATIONAL THERAPY | Facility: CLINIC | Age: 68
Setting detail: THERAPIES SERIES
End: 2018-05-08
Attending: INTERNAL MEDICINE
Payer: COMMERCIAL

## 2018-05-08 DIAGNOSIS — Z87.891 PERSONAL HISTORY OF TOBACCO USE, PRESENTING HAZARDS TO HEALTH: Primary | ICD-10-CM

## 2018-05-08 DIAGNOSIS — I10 ESSENTIAL HYPERTENSION: ICD-10-CM

## 2018-05-08 DIAGNOSIS — I25.118 CORONARY ARTERY DISEASE OF NATIVE ARTERY OF NATIVE HEART WITH STABLE ANGINA PECTORIS (H): ICD-10-CM

## 2018-05-08 DIAGNOSIS — F06.4 ANXIETY DISORDER DUE TO GENERAL MEDICAL CONDITION: ICD-10-CM

## 2018-05-08 DIAGNOSIS — I50.22 CHRONIC SYSTOLIC CONGESTIVE HEART FAILURE (H): ICD-10-CM

## 2018-05-08 DIAGNOSIS — E11.9 DIABETES MELLITUS TYPE 2 WITHOUT RETINOPATHY (H): ICD-10-CM

## 2018-05-08 DIAGNOSIS — F43.21 ADJUSTMENT DISORDER WITH DEPRESSED MOOD: ICD-10-CM

## 2018-05-08 DIAGNOSIS — F25.9 SCHIZOAFFECTIVE DISORDER, UNSPECIFIED TYPE (H): ICD-10-CM

## 2018-05-08 DIAGNOSIS — G47.01 INSOMNIA DUE TO MEDICAL CONDITION: ICD-10-CM

## 2018-05-08 DIAGNOSIS — G89.4 CHRONIC PAIN SYNDROME: ICD-10-CM

## 2018-05-08 DIAGNOSIS — I70.229 ATHEROSCLEROTIC PERIPHERAL VASCULAR DISEASE WITH REST PAIN (H): ICD-10-CM

## 2018-05-08 PROCEDURE — 99606 MTMS BY PHARM EST 15 MIN: CPT | Performed by: PHARMACIST

## 2018-05-08 PROCEDURE — 99607 MTMS BY PHARM ADDL 15 MIN: CPT | Performed by: PHARMACIST

## 2018-05-08 PROCEDURE — 97535 SELF CARE MNGMENT TRAINING: CPT | Mod: GO | Performed by: OCCUPATIONAL THERAPIST

## 2018-05-08 PROCEDURE — 40000249 ZZH STATISTIC VISIT LOW VISION CLINIC: Performed by: OCCUPATIONAL THERAPIST

## 2018-05-08 NOTE — DISCHARGE INSTRUCTIONS
Visual Rehabilitation Summary  1. Magnification: use the handheld and the stand magnifier that you have. The stand magnifier works best with writing material propped up on a reading stand.   2. Contrast  a. Place dark objects on light solid backgrounds, and light objects on dark solid backgrounds.  Avoid pattern, as pattern hides things.  3. Visual Scanning (tabletop)  a. Use your hand to guide your eyes so you don t look past things.   Scan in a methodical way.  When you return, we will work  with the iphone.  Install  Protonet  application before you come if able.  The [employer]  ashish is free.   Manuela Mitchell, OTR/L  [phone]

## 2018-05-08 NOTE — MR AVS SNAPSHOT
After Visit Summary   5/8/2018    Sonya Foote    MRN: [id number]           Patient Information     Date Of Birth          1950        Visit Information        Provider Department      5/8/2018 3:00 PM Elizabeth Rose RPH [facility]        Today's Diagnoses     Personal history of tobacco use, presenting hazards to health    -  1    Coronary artery disease of native artery of native heart with stable angina pectoris (H)        Diabetes mellitus type 2 without retinopathy (H)        Essential hypertension        Atherosclerotic peripheral vascular disease with rest pain (H)        Chronic systolic congestive heart failure (H)        Schizoaffective disorder, unspecified type (H)        Adjustment disorder with depressed mood        Anxiety disorder due to general medical condition        Chronic pain syndrome        Insomnia due to medical condition           Follow-ups after your visit        Your next 10 appointments already scheduled     May 15, 2018 11:00 AM CDT   L/Vision Treatment with Manuela Mitchell, OT   [facility] ([facility])    [address] [facility] [address]   [phone]            May 17, 2018 12:40 PM CDT   LAB with REZA LAB   [facility] ([facility])    [address] [place] [id number] [phone]           Please do not eat 10-12 hours before your appointment if you are coming in fasting for labs on lipids, cholesterol, or glucose (sugar). This does not apply to pregnant women. Water, hot tea and black coffee (with nothing added) are okay. Do not drink other fluids, diet soda or chew gum.            May 17, 2018  1:30 PM CDT   Return Discharge with VICTOR M Castellano [facility]   [place] ([facility])    [address] [place], [place] [address]  [facility] [address] [phone] OPT 2          "   May 29, 2018 11:00 AM CDT   L/Vision Treatment with Manuela Mitchell OT   [facility] ([facility] [place])    [address] [facility] [address] [phone]            Jun 04, 2018  9:30 AM CDT   (Arrive by 9:15 AM)   Return Visit with Alina Jennings MD   [facility] ([facility])    [address]  [address]  [facility] [address] [phone]            Jun 26, 2018 10:45 AM CDT   Return Visit with Bj Anderson MD   [facility] ([facility])    [facility] [facility] [address]  [facility] [address] [phone] OPT 2            Jul 26, 2018 10:15 AM CDT   RETURN GLAUCOMA with Marely Robin MD   [facility] ([facility])    [address]  [facility] [address] [phone]              Who to contact     If you have questions or need follow up information about today's clinic visit or your schedule please contact [facility] directly at [phone].  Normal or non-critical lab and imaging results will be communicated to you by MyChart, letter or phone within 4 business days after the clinic has received the results. If you do not hear from us within 7 days, please contact the clinic through MyChart or phone. If you have a critical or abnormal lab result, we will notify you by phone as soon as possible.  Submit refill requests through [employer] or call your pharmacy and they will forward the refill request to us. Please allow 3 business days for your refill to be completed.          Additional Information About Your Visit        [employer] Information     [employer] lets you send messages to your doctor, view your test results, renew your prescriptions, schedule appointments and more. To sign up, go to [email or web address] . Click on \"Log in\" on the left " "side of the screen, which will take you to the Welcome page. Then click on \"Sign up Now\" on the right side of the page.     You will be asked to enter the access code listed below, as well as some personal information. Please follow the directions to create your username and password.     Your access code is: [email or web address]  Expires: 2018 11:08 AM     Your access code will  in 90 days. If you need help or a new code, please call your [place] clinic or [phone].        Care EveryWhere ID     This is your Care EveryWhere ID. This could be used by other organizations to access your [place] medical records  [id number]         Blood Pressure from Last 3 Encounters:   18 142/72   18 135/80   18 153/77    Weight from Last 3 Encounters:   18 138 lb (62.6 kg)   18 131 lb 6.4 oz (59.6 kg)   18 123 lb (55.8 kg)              Today, you had the following     No orders found for display         Today's Medication Changes          These changes are accurate as of 18 11:59 PM.  If you have any questions, ask your nurse or doctor.               These medicines have changed or have updated prescriptions.        Dose/Directions    Phenytoin Sodium Extended 200 MG Caps   This may have changed:  additional instructions   Used for:  Seizure disorder (H)        Dose:  200 mg   Take 200 mg by mouth 2 times daily   Quantity:  60 capsule   Refills:  0                Primary Care Provider Office Phone # Fax #    Allan Casey [phone]       [address] [place] [address]        Equal Access to Services     [facility] : John Cohen, reji glez, tonie gee. So [facility] [phone].    ATENCIÓN: Si habla español, tiene a briones disposición servicios gratuitos de asistencia lingüística. Meño al [phone].    We comply with applicable federal civil rights laws and [place] laws. We " do not discriminate on the basis of race, color, national origin, age, disability, sex, sexual orientation, or gender identity.            Thank you!     Thank you for choosing [facility]  for your care. Our goal is always to provide you with excellent care. Hearing back from our patients is one way we can continue to improve our services. Please take a few minutes to complete the written survey that you may receive in the mail after your visit with us. Thank you!             Your Updated Medication List - Protect others around you: Learn how to safely use, store and throw away your medicines at www.disposemymeds.org.          This list is accurate as of 5/8/18 11:59 PM.  Always use your most recent med list.                   Brand Name Dispense Instructions for use Diagnosis    ACETAMINOPHEN PO      Take 1,000 mg by mouth every 4 hours as needed for pain Not to exceed 4000 mg/day        ADVAIR DISKUS 250-50 MCG/DOSE diskus inhaler   Generic drug:  fluticasone-salmeterol     60 Inhaler    Inhale 1 puff into the lungs 2 times daily    Acute bronchitis       * albuterol (2.5 MG/3ML) 0.083% neb solution     360 mL    INHALE 1 VIAL VIA NEBULIZER EVERY 6 HOURS AS NEEDED    Chronic obstructive pulmonary disease, unspecified COPD type (H)       * VENTOLIN  (90 Base) MCG/ACT Inhaler   Generic drug:  albuterol     3 Inhaler    Inhale 2 puffs into the lungs every 6 hours as needed for shortness of breath / dyspnea or wheezing    Chronic obstructive pulmonary disease, unspecified COPD type (H)       aspirin 81 MG EC tablet     90 tablet    Take 1 tablet (81 mg) by mouth daily    Unstable angina (H)       atorvastatin 40 MG tablet    LIPITOR    90 tablet    Take 1 tablet (40 mg) by mouth daily    Hyperlipidemia LDL goal <100       blood glucose monitoring lancets     100 each    Use to test blood sugar 3 times daily or as directed.    Type 2 diabetes mellitus with diabetic peripheral angiopathy  without gangrene, without long-term current use of insulin (H)       blood glucose monitoring meter device kit     1 kit    Use to test blood sugars 3 times daily or as directed.    Type 2 diabetes, HbA1C goal < 8% (H)       blood glucose monitoring test strip    ONETOUCH ULTRA    3 Box    Use to test blood sugars 3 times daily or as directed.    Type 2 diabetes mellitus with diabetic peripheral angiopathy without gangrene, without long-term current use of insulin (H)       brimonidine 0.2 % ophthalmic solution    ALPHAGAN    1 Bottle    Place 1 drop into the right eye 2 times daily    Primary open angle glaucoma of both eyes, severe stage       calcium citrate-vitamin D 315-250 MG-UNIT Tabs per tablet    CITRACAL    120 tablet    Take 2 tablets by mouth daily    Osteoporosis       cetirizine 10 MG tablet    zyrTEC    90 tablet    Take 1 tablet (10 mg) by mouth daily as needed for allergies    Seasonal allergic rhinitis, unspecified allergic rhinitis trigger       CYANOCOBALAMIN PO      Take 2,000 mcg by mouth daily        diclofenac 1 % Gel topical gel    VOLTAREN    100 g    Apply 2 g topically 4 times daily to hands    Primary osteoarthritis of both hands       dorzolamide 2 % ophthalmic solution    TRUSOPT    1 Bottle    Place 1 drop into the right eye 2 times daily    Primary open angle glaucoma of both eyes, severe stage       EPINEPHrine 0.3 MG/0.3ML injection 2-pack    EPIPEN/ADRENACLICK/or ANY BX GENERIC EQUIV    0.6 mL    Inject 0.3 mLs (0.3 mg) into the muscle as needed for anaphylaxis    Bee sting reaction, undetermined intent, subsequent encounter       escitalopram 10 MG tablet    LEXAPRO     Take 10 mg by mouth daily        ESTRACE VAGINAL 0.1 MG/GM cream   Generic drug:  estradiol     42.5 g    USE DIME SIZE AMOUNT 3X A WEEK AT NIGHT    Atrophic vaginitis       estradiol 1 MG tablet    ESTRACE    90 tablet    Take 1 tablet (1 mg) by mouth daily    Person who has had sex change operation       ferrous  sulfate 325 (65 Fe) MG tablet    IRON    60 tablet    Take 1 tablet (325 mg) by mouth 2 times daily With meals        fluticasone 50 MCG/ACT spray    FLONASE     Spray 1 spray into both nostrils daily        guaiFENesin-codeine 100-10 MG/5ML Soln solution    ROBITUSSIN AC    120 mL    Take 5 mLs by mouth every 4 hours as needed for cough    Upper respiratory tract infection, unspecified type       hydrochlorothiazide 12.5 MG capsule    MICROZIDE    90 capsule    Take 1 capsule (12.5 mg) by mouth daily    Essential hypertension with goal blood pressure less than 130/80       INCRUSE ELLIPTA 62.5 MCG/INH oral inhaler   Generic drug:  umeclidinium      Inhale 1 puff into the lungs daily        lactulose 10 GM/15ML solution    CHRONULAC     Take 20 g by mouth as needed for constipation        latanoprost 0.005 % ophthalmic solution    XALATAN    2.5 mL    Place 1 drop into both eyes At Bedtime    Primary open angle glaucoma of both eyes, severe stage       levETIRAcetam 500 MG tablet    KEPPRA    180 tablet    Take 1 tablet (500 mg) by mouth 2 times daily    Nausea       lidocaine 5 % Patch    LIDODERM    30 patch    APPLY 1 TO 3 PATCHES TO PAINFUL AREA AT ONCE FOR UP TO 12 HOURS WITHIN A 24 HOUR PERIOD REMOVE AFTER 12 HOURS    Chronic pain syndrome, 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 (H)       lisinopril 20 MG tablet    PRINIVIL/ZESTRIL    90 tablet    Take 1 tablet (20 mg) by mouth daily    History of coronary artery disease       MEDICATION GIVEN BY INTRATHECAL PUMP - INSTRUCTION      continuous 2/8/18: Pump managed by [facility] in [place] [phone].  Conc: Bupivacaine 20 mg/mL and Fentanyl 2000 mcg/mL, morphine 2 mg/mL Continuous:  Fentanyl 796 mcg/day, Bupivacaine 7.96 mg/day, Morphine 0.796 mg/day  Boluses: Up to 7 boluses per 24-hr period of Bupivicaine 0.5994 mg and Fentanyl 59.9 mcg morphine 0.0599 mg. Pump Refill Date 02/28/18        metFORMIN 500 MG 24  hr tablet    GLUCOPHAGE-XR    90 tablet    Take 1 tablet (500 mg) by mouth daily (with dinner)    Type 2 diabetes mellitus with diabetic peripheral angiopathy and gangrene, without long-term current use of insulin (H)       metoprolol succinate 50 MG 24 hr tablet    TOPROL-XL    90 tablet    Take 1 tablet (50 mg) by mouth daily    History of coronary artery disease       MULTIVITAMIN PO      Take 1 tablet by mouth daily        nicotine 14 MG/24HR 24 hr patch    NICODERM CQ    30 patch    Place 1 patch onto the skin daily    Tobacco dependence syndrome       nitroGLYcerin 0.4 MG sublingual tablet    NITROSTAT    25 tablet    Place 1 tablet (0.4 mg) under the tongue every 5 minutes as needed for chest pain if you are still having symptoms after 3 doses (15 minutes) call 911.    Chronic systolic congestive heart failure (H), Old myocardial infarction       ondansetron 8 MG ODT tab    ZOFRAN-ODT    30 tablet    Take 1 tablet (8 mg) by mouth every 8 hours as needed    Other migraine without status migrainosus, not intractable       order for DME     1 Month    Equipment being ordered: Depends briefs    Incontinence       order for DME     1 Device    Equipment being ordered: Power Wheelchair    CVA (cerebral infarction), HTN (hypertension)       order for DME     1 Box    Equipment being ordered: Glucerna daily shakes with each meal    Type 2 diabetes mellitus with other diabetic neurological complication       * order for DME     1 Units    Equipment being ordered: Nebulizer and tubing supplies    Simple chronic bronchitis (H)       * order for DME     1 Device    Equipment being ordered: CPAP supplies mask, hose, filters, cushion fax to [facility] at [phone] Disp: 10 DeviceRefills: prn Class: Local PrintStart: 2/10/2017    Chronic obstructive pulmonary disease, unspecified COPD type (H)       * order for DME     10 Device    Equipment being ordered: CPAP supplies mask, hose, filters, cushion  fax to [facility] at [phone]    COPD (chronic obstructive pulmonary disease) (H)       * order for DME     1 Device    Hospital bed with side rails    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 (H)       * order for DME     1 Device    Full electric hospital bed with half rails  Dx: S94367, I110, J449 Length of need: lifetime    Status post bilateral above knee amputation (H)       * order for DME     1 Device    Wheel Chair Cushion: 18 x 18 inch Roho cushion    Status post bilateral above knee amputation (H)       order for DME     1 Package    Equipment being ordered: bandage tape, prefer paper    Burn of skin       pantoprazole 40 MG EC tablet    PROTONIX    30 tablet    Take 1 tablet (40 mg) by mouth daily    Gastroesophageal reflux disease without esophagitis       Phenytoin Sodium Extended 200 MG Caps     60 capsule    Take 200 mg by mouth 2 times daily    Seizure disorder (H)       polyethylene glycol Packet    MIRALAX/GLYCOLAX     Take 17 g by mouth daily Dissolved in water or juice        pregabalin 75 MG capsule    LYRICA    120 capsule    Take 2 capsules (150 mg) by mouth 2 times daily    Pain in both upper extremities       PROCHLORPERAZINE MALEATE PO      Take 10 mg by mouth every 8 hours as needed for nausea or vomiting        risperiDONE 0.5 MG tablet    risperDAL     Take 0.5 mg by mouth At Bedtime        silver sulfADIAZINE 1 % cream    SILVADENE    50 g    Apply topically 2 times daily    Burn       sucralfate 1 GM tablet    CARAFATE    120 tablet    Take 1 tablet (1 g) by mouth 4 times daily May dissolve in 10 mL water is necessary. (Start upon completion of carafate suspension)    Adjustment disorder with depressed mood       traZODone 50 MG tablet    DESYREL     Take 150 mg by mouth At Bedtime        vitamin D 2000 units tablet     100 tablet    Take 2,000 Units by mouth daily.        zinc 50 MG Tabs      Take 1 tablet by mouth daily        * Notice:  This list has 8 medication(s)  that are the same as other medications prescribed for you. Read the directions carefully, and ask your doctor or other care provider to review them with you.

## 2018-05-08 NOTE — PROGRESS NOTES
"SUBJECTIVE/OBJECTIVE:                Sonya Foote is a 68 year old female called for a transitions of care visit.  She was discharged from [facility] on 5/3/18 for atypical chest pain.    Chief Complaint: follow-up hospital visit.  Pt did not want to be on this phone visit, and was in a hurry to get off of the phone.  States she doesn't want any changes to her meds.    Allergies/ADRs: Reviewed in Epic  Tobacco: 0-1 pack per day - is interested in quitting Tobacco Cessation Action Plan: Nicotine patches.  Notes that she was just started on patch, but AL doesn't have them for her, so waiting to get from pharmacy.  Says \"I'm going to smoke if I don't have the patch.\"  Also uses e-cig.  Willing to stop smoking once she can continue patch.    Alcohol: not currently using  Caffeine: 3-4 cups/day of coffee bkfst, lunch, dinner  Activity: w/c bound; bilateral AKA  PMH: Reviewed in Epic      HFrEF/HTN/CAD/atypical chest pain: Current medications include HCTZ 12.5mg daily  metoprolol succinate 50mg daily (increased from 25mg daily at last hospitalization)  lisinopril 20mg once daily (increased from 10mg daily at last hospitalization).  Also on ASA 81mg daily.  NTG prn. Patient does not report side effects today.     ECHO: Date 5/2/2018: EF >70%  Date 12/22/2016, EF 35-40%  Pt is complaining of sx of HFof: shortness of breath (notes always SOB due to COPD), not changed. No chest pain today; notes occasional chest pain. Pt is not measuring daily weights.   Patient has BP monitored by [facility] home.  These readings are not available to me today.    Pt is following a low sodium diet, is avoiding EtOH.  History of multiple CVAs and MIs per patient.  Stent placement in September 2016.       PVD: Current medications include ASA 81mg daily.  No longer on Plavix due to h/o g.i. Bleed with dual antiplatelet therapy per chart review.  Both legs amputated.  H/o anemia, bruises easily.     Pain: Current medication includes pump therapy " "with Fentanyl & Morphine for back pain; h/o DJD/lower back pain.  Also on Lyrica 150mg bid and Lidocaine patches for neuropathy and phantom limb pain, Tylenol ES prn. Diclofenac gel helpful for pain in hands. Notes that the narcotics only help with back pain, not her arms or phantom limb pain.  Says \"I'm not happy with all of you\" regarding pain control because Tylenol isn't helpful & she notes providers won't give her anything stronger in addition to what she's already on because of narcotic use. Potential medication side effects she is experiencing: nausea, edema, falls.    She feels move to [place] in a couple months may be helpful for pain due to warmer weather.  Notes ice and heat not helpful for pain.    Depressio/Anxiety/Schizoaffective/Insomnia:  Current medications include: Escitalopram 10mg once daily and Risperidone 0.5mg at bedtime, Trazodone 150mg at bedtime. Pt reports that depression symptoms are managed ok currently.  States mood is \"not bad.\"  Again, looking forward to [place] in this regard; going to live with younger sister.  Notes 3 recent falls:  Fell out of chair when reaching without seatbelt on, fell off edge of bed.  As noted above, patient did not want to be on this call, and was rather short with me.  She has not been this way in the past.    PHQ-9 SCORE 3/8/2016 3/6/2017 1/31/2018   Total Score - - -   Total Score 4 9 6     Diabetes:  Current medication includes Metformin 500mg daily.  We did not discuss diabetes today, but I noted per Epic chart review, pt should be receiving ER formulation due to g.i. Side effects with IR in past, but looks like AL may be giving the IR formulation.    Current labs include:  BP Readings from Last 3 Encounters:   05/04/18 135/80   05/03/18 153/77   04/22/18 (!) 157/95     Today's Vitals: There were no vitals taken for this visit.  Lab Results   Component Value Date    A1C 4.4 05/02/2018   .  Lab Results   Component Value Date    CHOL 155 06/06/2016     Lab " Results   Component Value Date    TRIG 62 06/06/2016     Lab Results   Component Value Date    HDL 65 06/06/2016     Lab Results   Component Value Date    LDL 77 06/06/2016       Liver Function Studies -   Recent Labs   Lab Test  05/02/18   0330   PROTTOTAL  7.3   ALBUMIN  2.7*   BILITOTAL  0.1*   ALKPHOS  228*   AST  38   ALT  44       Lab Results   Component Value Date    UCRR 54 02/05/2016    MICROL 6 02/05/2016    UMALCR 10.72 02/05/2016       Last Basic Metabolic Panel:  Lab Results   Component Value Date     05/03/2018      Lab Results   Component Value Date    POTASSIUM 3.8 05/03/2018     Lab Results   Component Value Date    CHLORIDE 110 05/03/2018     Lab Results   Component Value Date    BUN 6 05/03/2018     Lab Results   Component Value Date    CR 0.48 05/03/2018     GFR Estimate   Date Value Ref Range Status   05/03/2018 >90 >60 mL/min/1.7m2 Final     Comment:     Non  GFR Calc   05/02/2018 >90 >60 mL/min/1.7m2 Final     Comment:     Non  GFR Calc   02/09/2018 69 >60 mL/min/1.7m2 Final     Comment:     Non  GFR Calc     GFR Estimate If Black   Date Value Ref Range Status   05/03/2018 >90 >60 mL/min/1.7m2 Final     Comment:      GFR Calc   05/02/2018 >90 >60 mL/min/1.7m2 Final     Comment:      GFR Calc   02/09/2018 84 >60 mL/min/1.7m2 Final     Comment:      GFR Calc     TSH   Date Value Ref Range Status   11/04/2016 0.84 0.40 - 4.00 mU/L Final   ]    Most Recent Immunizations   Administered Date(s) Administered     Influenza (High Dose) 3 valent vaccine 09/06/2017     Influenza (IIV3) PF 09/01/2013     Pneumo Conj 13-V (2010&after) 10/22/2015     Pneumococcal 23 valent 02/22/2014     TD (ADULT, 7+) 01/01/2011     TDAP Vaccine (Boostrix) 09/06/2017       ASSESSMENT:                 Current medications were reviewed today.      Medication Adherence: good, no issues identified    Of note, there are  opportunities to streamline meds as pt has significant polypharmacy.  However, pt makes clear today that she wants no changes to meds.      HFrEF/HTN/CAD/atypical chest pain: Recent changes/increases made to BP meds; continue to monitor.  Goal BP <140/90mmHg.  Discussed need for smoking cessation and benefits of.  Pt agreeable to stop once she starts the patch.    PVD:  Stable.     Pain:  Difficult to manage pain.  Pt hoping move to warmer weather will be beneficial.      Depressio/Anxiety/Schizoaffective/Insomnia:  Stable.    Diabetes:  As noted above, did not discuss diabetes control today; pt in a hurry and did not want to be on call.  I did note on the AL med list that it appears pt is getting IR Metformin vs ER that was ordered; will verify with AL & have them correct if truly receiving IR formulation.    PLAN:                  AL to order Nicoderm patch from pharmacy.  Pt will begin patches once received & discussed that she can not smoke or use e-cig while on patches.  Pt agrees.    Contacted AL to verify which formulation of Metformin pt is receiving.  Appears she is getting IR, but should be getting ER 500mg daily.  Left message for AL.    I spent 15 minutes with this patient today. A copy of the visit note was provided to the patient's primary care provider.    Will follow upon patient, provider, or  request.  Pt did not want to participate in call today.    The patient declined a summary of these recommendations as an after visit summary.    Elizabeth Rose, Pharm.D.,[facility]  Board Certified Geriatric Pharmacist  Medication Therapy Management Pharmacist  [phone]

## 2018-05-09 ENCOUNTER — OFFICE VISIT (OUTPATIENT)
Dept: INTERNAL MEDICINE | Facility: CLINIC | Age: 68
End: 2018-05-09
Payer: COMMERCIAL

## 2018-05-09 VITALS
BODY MASS INDEX: 31.94 KG/M2 | DIASTOLIC BLOOD PRESSURE: 72 MMHG | SYSTOLIC BLOOD PRESSURE: 142 MMHG | WEIGHT: 138 LBS | RESPIRATION RATE: 17 BRPM | TEMPERATURE: 98.6 F | HEIGHT: 55 IN | HEART RATE: 74 BPM

## 2018-05-09 DIAGNOSIS — R07.89 ATYPICAL CHEST PAIN: ICD-10-CM

## 2018-05-09 DIAGNOSIS — F17.200 TOBACCO DEPENDENCE SYNDROME: ICD-10-CM

## 2018-05-09 DIAGNOSIS — Z09 HOSPITAL DISCHARGE FOLLOW-UP: Primary | ICD-10-CM

## 2018-05-09 DIAGNOSIS — Z86.79 HISTORY OF CORONARY ARTERY DISEASE: ICD-10-CM

## 2018-05-09 DIAGNOSIS — T30.0 BURN: ICD-10-CM

## 2018-05-09 DIAGNOSIS — J06.9 UPPER RESPIRATORY TRACT INFECTION, UNSPECIFIED TYPE: ICD-10-CM

## 2018-05-09 DIAGNOSIS — E66.01 MORBID OBESITY (H): ICD-10-CM

## 2018-05-09 PROCEDURE — 99495 TRANSJ CARE MGMT MOD F2F 14D: CPT | Performed by: INTERNAL MEDICINE

## 2018-05-09 RX ORDER — NICOTINE 21 MG/24HR
1 PATCH, TRANSDERMAL 24 HOURS TRANSDERMAL DAILY
Qty: 30 PATCH | Refills: 0 | Status: SHIPPED | OUTPATIENT
Start: 2018-05-09 | End: 2019-04-25

## 2018-05-09 RX ORDER — SILVER SULFADIAZINE 10 MG/G
CREAM TOPICAL 2 TIMES DAILY
Qty: 50 G | Refills: 0 | Status: SHIPPED | OUTPATIENT
Start: 2018-05-09 | End: 2018-06-16

## 2018-05-09 RX ORDER — CODEINE PHOSPHATE AND GUAIFENESIN 10; 100 MG/5ML; MG/5ML
1 SOLUTION ORAL EVERY 4 HOURS PRN
Qty: 120 ML | Refills: 0 | Status: SHIPPED | OUTPATIENT
Start: 2018-05-09 | End: 2018-06-16

## 2018-05-09 RX ORDER — LISINOPRIL 20 MG/1
20 TABLET ORAL DAILY
Qty: 90 TABLET | Refills: 0 | Status: SHIPPED | OUTPATIENT
Start: 2018-05-09 | End: 2018-07-19

## 2018-05-09 RX ORDER — METOPROLOL SUCCINATE 50 MG/1
50 TABLET, EXTENDED RELEASE ORAL DAILY
Qty: 90 TABLET | Refills: 0 | Status: SHIPPED | OUTPATIENT
Start: 2018-05-09 | End: 2018-07-19

## 2018-05-09 ASSESSMENT — PAIN SCALES - GENERAL: PAINLEVEL: EXTREME PAIN (8)

## 2018-05-09 NOTE — MR AVS SNAPSHOT
After Visit Summary   5/9/2018    Sonya Foote    MRN: [id number]           Patient Information     Date Of Birth          1950        Visit Information        Provider Department      5/9/2018 10:20 AM Allan Casey MD [facility]        Today's Diagnoses     Hospital discharge follow-up    -  1    Atypical chest pain        Burn        Morbid obesity (H)        Upper respiratory tract infection, unspecified type        History of coronary artery disease        Tobacco dependence syndrome           Follow-ups after your visit        Follow-up notes from your care team     Return if symptoms worsen or fail to improve.      Your next 10 appointments already scheduled     May 15, 2018 11:00 AM CDT   L/Vision Treatment with UL[facility] ([facility])    [address] [facility] [address] [phone]            May 17, 2018 12:40 PM CDT   LAB with RE[facility]   [facility] [place] ([facility])    [address] [place] [id number] [phone]           Please do not eat 10-12 hours before your appointment if you are coming in fasting for labs on lipids, cholesterol, or glucose (sugar). This does not apply to pregnant women. Water, hot tea and black coffee (with nothing added) are okay. Do not drink other fluids, diet soda or chew gum.            May 17, 2018  1:30 PM CDT   Return Discharge with VICTOR M Castellano CNP   [facility] ([facility])    [address] [place] [address] [address]  [facility] [address] [phone] OPT 2            May 29, 2018 11:00 AM CDT   L/Vision Treatment with ULISES [facility] ([facility])    [address] [facility] [address] [phone]            Jun 04, 2018  9:30 AM CDT   (Arrive by 9:15 AM)  "  Return Visit with Alina Jennings MD   [facility] ([facility])    [address]  [address]  [facility] [address] [phone]            Jun 26, 2018 10:45 AM CDT   Return Visit with Bj Anderson MD   [facility] ([facility])    [address]  [facility] [address]   [phone] OPT 2            Jul 26, 2018 10:15 AM CDT   RETURN GLAUCOMA with Marely Robin MD   [facility] ([facility])    [facility]  [address] [place] [address]  9th Fl Clin [address]a  [facility] [address] [phone]              Who to contact     If you have questions or need follow up information about today's clinic visit or your schedule please contact [facility] directly at [phone].  Normal or non-critical lab and imaging results will be communicated to you by Flasmahart, letter or phone within 4 business days after the clinic has received the results. If you do not hear from us within 7 days, please contact the clinic through [employer] or phone. If you have a critical or abnormal lab result, we will notify you by phone as soon as possible.  Submit refill requests through [employer] or call your pharmacy and they will forward the refill request to us. Please allow 3 business days for your refill to be completed.          Additional Information About Your Visit        [employer] Information     [employer] lets you send messages to your doctor, view your test results, renew your prescriptions, schedule appointments and more. To sign up, go to [email or web address] . Click on \"Log in\" on the left side of the screen, which will take you to the Welcome page. Then click on \"Sign up Now\" on the right side of the page.     You will be asked to enter the access code listed below, as well as some personal information. Please follow the directions to create " "your username and password.     Your access code is: [email or web address]  Expires: 2018 11:08 AM     Your access code will  in 90 days. If you need help or a new code, please call your [facility] or [phone].        Care EveryWhere ID     This is your Care EveryWhere ID. This could be used by other organizations to access your [place] medical records  [id number]        Your Vitals Were     Pulse Temperature Respirations Height BMI (Body Mass Index)       74 98.6  F (37  C) (Oral) 17 3' 7\" (1.092 m) 52.47 kg/m2        Blood Pressure from Last 3 Encounters:   18 142/72   18 135/80   18 153/77    Weight from Last 3 Encounters:   18 138 lb (62.6 kg)   18 131 lb 6.4 oz (59.6 kg)   18 123 lb (55.8 kg)              Today, you had the following     No orders found for display         Today's Medication Changes          These changes are accurate as of 18 10:21 AM.  If you have any questions, ask your nurse or doctor.               These medicines have changed or have updated prescriptions.        Dose/Directions    Phenytoin Sodium Extended 200 MG Caps   This may have changed:  additional instructions   Used for:  Seizure disorder (H)        Dose:  200 mg   Take 200 mg by mouth 2 times daily   Quantity:  60 capsule   Refills:  0            Where to get your medicines      These medications were sent to [id number] - [place], [place] - [address]  [address], [facility] [address]     Phone:  [phone]     lisinopril 20 MG tablet    metoprolol succinate 50 MG 24 hr tablet    nicotine 14 MG/24HR 24 hr patch    silver sulfADIAZINE 1 % cream         Some of these will need a paper prescription and others can be bought over the counter.  Ask your nurse if you have questions.     Bring a paper prescription for each of these medications     guaiFENesin-codeine 100-10 MG/5ML Soln solution                Primary Care Provider Office " Phone # Fax #    Allan Casey [phone]       [address] [facility] [address]        Equal Access to Services     KARI CARREON : John shaheen gastelum robinson Sojoe, waaxda luqadaha, qaybta kaalmada shashank, tonie schmitt lawesrossana marroquin. So [facility] [phone].    ATENCIÓN: Si habla español, tiene a briones disposición servicios gratuitos de asistencia lingüística. Llame al [phone].    We comply with applicable federal civil rights laws and [place] laws. We do not discriminate on the basis of race, color, national origin, age, disability, sex, sexual orientation, or gender identity.            Thank you!     Thank you for choosing [facility]  for your care. Our goal is always to provide you with excellent care. Hearing back from our patients is one way we can continue to improve our services. Please take a few minutes to complete the written survey that you may receive in the mail after your visit with us. Thank you!             Your Updated Medication List - Protect others around you: Learn how to safely use, store and throw away your medicines at www.disposemymeds.org.          This list is accurate as of 5/9/18 10:21 AM.  Always use your most recent med list.                   Brand Name Dispense Instructions for use Diagnosis    ACETAMINOPHEN PO      Take 1,000 mg by mouth every 4 hours as needed for pain Not to exceed 4000 mg/day        ADVAIR DISKUS 250-50 MCG/DOSE diskus inhaler   Generic drug:  fluticasone-salmeterol     60 Inhaler    Inhale 1 puff into the lungs 2 times daily    Acute bronchitis       * albuterol (2.5 MG/3ML) 0.083% neb solution     360 mL    INHALE 1 VIAL VIA NEBULIZER EVERY 6 HOURS AS NEEDED    Chronic obstructive pulmonary disease, unspecified COPD type (H)       * VENTOLIN  (90 Base) MCG/ACT Inhaler   Generic drug:  albuterol     3 Inhaler    Inhale 2 puffs into the lungs every 6 hours as needed for shortness of  breath / dyspnea or wheezing    Chronic obstructive pulmonary disease, unspecified COPD type (H)       aspirin 81 MG EC tablet     90 tablet    Take 1 tablet (81 mg) by mouth daily    Unstable angina (H)       atorvastatin 40 MG tablet    LIPITOR    90 tablet    Take 1 tablet (40 mg) by mouth daily    Hyperlipidemia LDL goal <100       blood glucose monitoring lancets     100 each    Use to test blood sugar 3 times daily or as directed.    Type 2 diabetes mellitus with diabetic peripheral angiopathy without gangrene, without long-term current use of insulin (H)       blood glucose monitoring meter device kit     1 kit    Use to test blood sugars 3 times daily or as directed.    Type 2 diabetes, HbA1C goal < 8% (H)       blood glucose monitoring test strip    ONETOUCH ULTRA    3 Box    Use to test blood sugars 3 times daily or as directed.    Type 2 diabetes mellitus with diabetic peripheral angiopathy without gangrene, without long-term current use of insulin (H)       brimonidine 0.2 % ophthalmic solution    ALPHAGAN    1 Bottle    Place 1 drop into the right eye 2 times daily    Primary open angle glaucoma of both eyes, severe stage       calcium citrate-vitamin D 315-250 MG-UNIT Tabs per tablet    CITRACAL    120 tablet    Take 2 tablets by mouth daily    Osteoporosis       cetirizine 10 MG tablet    zyrTEC    90 tablet    Take 1 tablet (10 mg) by mouth daily as needed for allergies    Seasonal allergic rhinitis, unspecified allergic rhinitis trigger       CYANOCOBALAMIN PO      Take 2,000 mcg by mouth daily        diclofenac 1 % Gel topical gel    VOLTAREN    100 g    Apply 2 g topically 4 times daily to hands    Primary osteoarthritis of both hands       dorzolamide 2 % ophthalmic solution    TRUSOPT    1 Bottle    Place 1 drop into the right eye 2 times daily    Primary open angle glaucoma of both eyes, severe stage       EPINEPHrine 0.3 MG/0.3ML injection 2-pack    EPIPEN/ADRENACLICK/or ANY BX GENERIC EQUIV     0.6 mL    Inject 0.3 mLs (0.3 mg) into the muscle as needed for anaphylaxis    Bee sting reaction, undetermined intent, subsequent encounter       escitalopram 10 MG tablet    LEXAPRO     Take 10 mg by mouth daily        ESTRACE VAGINAL 0.1 MG/GM cream   Generic drug:  estradiol     42.5 g    USE DIME SIZE AMOUNT 3X A WEEK AT NIGHT    Atrophic vaginitis       estradiol 1 MG tablet    ESTRACE    90 tablet    Take 1 tablet (1 mg) by mouth daily    Person who has had sex change operation       ferrous sulfate 325 (65 Fe) MG tablet    IRON    60 tablet    Take 1 tablet (325 mg) by mouth 2 times daily With meals        fluticasone 50 MCG/ACT spray    FLONASE     Spray 1 spray into both nostrils daily        guaiFENesin-codeine 100-10 MG/5ML Soln solution    ROBITUSSIN AC    120 mL    Take 5 mLs by mouth every 4 hours as needed for cough    Upper respiratory tract infection, unspecified type       hydrochlorothiazide 12.5 MG capsule    MICROZIDE    90 capsule    Take 1 capsule (12.5 mg) by mouth daily    Essential hypertension with goal blood pressure less than 130/80       INCRUSE ELLIPTA 62.5 MCG/INH oral inhaler   Generic drug:  umeclidinium      Inhale 1 puff into the lungs daily        lactulose 10 GM/15ML solution    CHRONULAC     Take 20 g by mouth as needed for constipation        latanoprost 0.005 % ophthalmic solution    XALATAN    2.5 mL    Place 1 drop into both eyes At Bedtime    Primary open angle glaucoma of both eyes, severe stage       levETIRAcetam 500 MG tablet    KEPPRA    180 tablet    Take 1 tablet (500 mg) by mouth 2 times daily    Nausea       lidocaine 5 % Patch    LIDODERM    30 patch    APPLY 1 TO 3 PATCHES TO PAINFUL AREA AT ONCE FOR UP TO 12 HOURS WITHIN A 24 HOUR PERIOD REMOVE AFTER 12 HOURS    Chronic pain syndrome, 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 (H)       lisinopril 20 MG tablet    PRINIVIL/ZESTRIL    90 tablet    Take 1 tablet (20 mg) by mouth daily     History of coronary artery disease       MEDICATION GIVEN BY INTRATHECAL PUMP - INSTRUCTION      continuous 2/8/18: Pump managed by [facility] in [place] [phone].  Conc: Bupivacaine 20 mg/mL and Fentanyl 2000 mcg/mL, morphine 2 mg/mL Continuous:  Fentanyl 796 mcg/day, Bupivacaine 7.96 mg/day, Morphine 0.796 mg/day  Boluses: Up to 7 boluses per 24-hr period of Bupivicaine 0.5994 mg and Fentanyl 59.9 mcg morphine 0.0599 mg. Pump Refill Date 02/28/18        metFORMIN 500 MG 24 hr tablet    GLUCOPHAGE-XR    90 tablet    Take 1 tablet (500 mg) by mouth daily (with dinner)    Type 2 diabetes mellitus with diabetic peripheral angiopathy and gangrene, without long-term current use of insulin (H)       metoprolol succinate 50 MG 24 hr tablet    TOPROL-XL    90 tablet    Take 1 tablet (50 mg) by mouth daily    History of coronary artery disease       MULTIVITAMIN PO      Take 1 tablet by mouth daily        nicotine 14 MG/24HR 24 hr patch    NICODERM CQ    30 patch    Place 1 patch onto the skin daily    Tobacco dependence syndrome       nitroGLYcerin 0.4 MG sublingual tablet    NITROSTAT    25 tablet    Place 1 tablet (0.4 mg) under the tongue every 5 minutes as needed for chest pain if you are still having symptoms after 3 doses (15 minutes) call 911.    Chronic systolic congestive heart failure (H), Old myocardial infarction       ondansetron 8 MG ODT tab    ZOFRAN-ODT    30 tablet    Take 1 tablet (8 mg) by mouth every 8 hours as needed    Other migraine without status migrainosus, not intractable       order for DME     1 Month    Equipment being ordered: Depends briefs    Incontinence       order for DME     1 Device    Equipment being ordered: Power Wheelchair    CVA (cerebral infarction), HTN (hypertension)       order for DME     1 Box    Equipment being ordered: Glucerna daily shakes with each meal    Type 2 diabetes mellitus with other diabetic neurological complication       *  order for DME     1 Units    Equipment being ordered: Nebulizer and tubing supplies    Simple chronic bronchitis (H)       * order for DME     1 Device    Equipment being ordered: CPAP supplies mask, hose, filters, cushion fax to [facility] at [phone] Disp: 10 DeviceRefills: prn Class: Local PrintStart: 2/10/2017    Chronic obstructive pulmonary disease, unspecified COPD type (H)       * order for DME     10 Device    Equipment being ordered: CPAP supplies mask, hose, filters, cushion  fax to [facility] at [phone]    COPD (chronic obstructive pulmonary disease) (H)       * order for DME     1 Device    Hospital bed with side rails    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 (H)       * order for DME     1 Device    Full electric hospital bed with half rails  Dx: K65089, I110, J449 Length of need: lifetime    Status post bilateral above knee amputation (H)       * order for DME     1 Device    Wheel Chair Cushion: 18 x 18 inch Roho cushion    Status post bilateral above knee amputation (H)       order for DME     1 Package    Equipment being ordered: bandage tape, prefer paper    Burn of skin       pantoprazole 40 MG EC tablet    PROTONIX    30 tablet    Take 1 tablet (40 mg) by mouth daily    Gastroesophageal reflux disease without esophagitis       Phenytoin Sodium Extended 200 MG Caps     60 capsule    Take 200 mg by mouth 2 times daily    Seizure disorder (H)       polyethylene glycol Packet    MIRALAX/GLYCOLAX     Take 17 g by mouth daily Dissolved in water or juice        pregabalin 75 MG capsule    LYRICA    120 capsule    Take 2 capsules (150 mg) by mouth 2 times daily    Pain in both upper extremities       PROCHLORPERAZINE MALEATE PO      Take 10 mg by mouth every 8 hours as needed for nausea or vomiting        risperiDONE 0.5 MG tablet    risperDAL     Take 0.5 mg by mouth At Bedtime        silver sulfADIAZINE 1 % cream    SILVADENE    50 g    Apply topically 2 times  daily    Burn       sucralfate 1 GM tablet    CARAFATE    120 tablet    Take 1 tablet (1 g) by mouth 4 times daily May dissolve in 10 mL water is necessary. (Start upon completion of carafate suspension)    Adjustment disorder with depressed mood       traZODone 50 MG tablet    DESYREL     Take 150 mg by mouth At Bedtime        vitamin D 2000 units tablet     100 tablet    Take 2,000 Units by mouth daily.        zinc 50 MG Tabs      Take 1 tablet by mouth daily        * Notice:  This list has 8 medication(s) that are the same as other medications prescribed for you. Read the directions carefully, and ask your doctor or other care provider to review them with you.

## 2018-05-09 NOTE — PROGRESS NOTES
SUBJECTIVE:   Sonya Foote is a 68 year old female who presents to clinic today for the following health issues:    Hospital Follow-up Visit:    Hospital/Nursing Home/IP Rehab Facility: [facility]  Date of Admission: 5/2/18  Date of Discharge: 5/3/18  Reason(s) for Admission: Atypical chest pain, right thigh wound/burn            Problems taking medications regularly:  Patient states her discharge medications were sent to the wrong pharmacy and she never received.        Medication changes since discharge: None       Problems adhering to non-medication therapy:  None    Summary of hospitalization:  [facility] discharge summary reviewed  Diagnostic Tests/Treatments reviewed.  Follow up needed: Cardiology  Other Healthcare Providers Involved in Patient s Care:         None  Update since discharge: stable.     Post Discharge Medication Reconciliation: discharge medications reconciled, continue medications without change.  Plan of care communicated with patient     Coding guidelines for this visit:  Type of Medical   Decision Making Face-to-Face Visit       within 7 Days of discharge Face-to-Face Visit        within 14 days of discharge   Moderate Complexity [id number]   High Complexity [id number]          Other concerns:  1. Patient states she still has runny nose, slight productive cough, and sweats since recent URI      1.  Atypical chest pain:  This occurred on 05/01 and has persisted since this time.  The patient's cardiac workup has been unremarkable with 3 negative troponins, EKG without evidence of ischemia.  The patient was evaluated by cardiology and underwent a heart cath that showed disease in the RCA that is non-flow limiting, mild disease of the LCA and a normal LVT with unusual blush from RVVR to septum.  The patient was previously on dual antiplatelet therapy, but this was discontinued due to a GI bleed.  It appears she had ongoing discussion with neurology and has since been on  a baby aspirin daily.  Attempted to go through last neurology note which was difficult to ascertain whether or not patient was on aspirin and Plavix recently or full-dose aspirin 2 times daily.  When discussing with the patient, she adamantly states she is on a baby aspirin daily.  The patient was continued on a baby aspirin daily, atorvastatin 40 mg daily, lisinopril 10 mg daily and metoprolol XL 25 mg daily.  Following cardiac cath and cardiology consultation, the patient's metoprolol was increased to 50 mg daily and lisinopril was increased to 20 mg daily, which will be prescribed at time of discharge with plans to follow up with cardiology on 05/17.  Otherwise, the patient will continue with a baby aspirin, atorvastatin and tobacco cessation with a Nicoderm patch for which the patient has requested.   2.  Right thigh wound/burn:  The patient indicated that several weeks ago, 2-3, she spilled hot coffee on herself which resulted in a large burn.  She indicated that it has improved greatly, but she has been utilizing Silvadene cream at home and is out of this medication.  I will order a new prescription of this at time of discharge.   3.  Ongoing tobacco abuse:  The patient underwent cessation counseling by several providers during this stay and she requested to start a Nicoderm patch, which will be ordered at time of discharge.   4.  Androgen insensitivity syndrome:  The patient had endorsed some vaginal irritation and discharge and underwent a wet prep in the emergency department that was unremarkable.  The patient was continued on her Estradiol 1 mg daily while her estrogen cream was held during this stay.  At time of discharge, both medications will be resumed.   5.  Coronary artery disease of native vessel and native heart, status post stenting to the RCA with associated hypertension and hyperlipidemia:  The patient was continued on hydrochlorothiazide 12.5 mg daily and had adjustment of medications,  metoprolol XL and lisinopril as stated above due to persistent elevated blood pressures.  She is also continued on her statin.  At time of discharge, the patient will have doubled her metoprolol and lisinopril and continuation of hydrochlorothiazide, aspirin, atorvastatin and will start a Nicoderm patch.  The patient will follow up with cardiology on 05/17.   6.  Chronic pain syndrome:  The patient's intrathecal pump was continued during this stay as was her Lyrica 150 mg 2 times daily, both of which will be continued at time of discharge.   7.  History of carotid artery stenosis with multiple CVAs:  The patient underwent a tobacco cessation counseling and will be discharged with a prescription for Nicoderm patch.  She was continued on baby aspirin daily and indicated that this is in fact the dose that she is supposed to be on as she was previously on dual antiplatelet therapy.  Would recommend followup with her neurologist at discharge.   8.  Type 2 diabetes:  The patient's metformin was continued during this stay and she was also placed on a low-dose insulin sliding scale.  The patient will continue with metformin at time of discharge.   9.  Epilepsy:  The patient remained stable throughout this stay and was continued on phenytoin 200 mg 2 times daily and Keppra 500 mg 2 times daily and both will be continued at time of discharge.   10.  Schizoaffective disorder:  The patient's prior to admit Risperdal 0.5 mg was continued every evening at bedtime as was her trazodone 150 mg every evening at bedtime.  Both will be resumed at time of discharge.   11.  Glaucoma:  The patient is legally blind, has a history of glaucoma as well as prior CVAs impairing visual fields.  She has had her eyedrops all continued during this stay and will be resumed at time of discharge.   12.  Hypokalemia:  I believe that this could be secondary to hydrochlorothiazide.  She underwent replacement protocol during this stay.   13.  GERD:  The  patient's prior to admit Protonix was continued during this stay and will be resumed at time of discharge.   Problem list and histories reviewed & adjusted, as indicated.  Additional history: as documented    Patient Active Problem List   Diagnosis     Hyperlipidemia LDL goal <100     Seizure disorder (H)     ACP (advance care planning)     Osteoporosis     Schizoaffective disorder (H)     AS (sickle cell trait) (H)     Vertigo     Person who has had sex change operation     Claudication in peripheral vascular disease (H)     Intestinal malabsorption     GIB (gastrointestinal bleeding)     Cervicalgia     Health Care Home     Asthma     Adjustment disorder with depressed mood     Chronic pain syndrome     Open-angle glaucoma     Hx of colonic polyp     Old myocardial infarction     Iron deficiency anemia     Late effect of stroke     Degeneration of intervertebral disc of lumbosacral region     Thoracic or lumbosacral neuritis or radiculitis     Cerebral infarction due to occlusion or stenosis of carotid artery     Disorder of bone and cartilage     Hereditary and idiopathic peripheral neuropathy     Androgen insensitivity syndrome     PAD (peripheral artery disease) (H)     Chronic systolic congestive heart failure (H)     Stenosis of carotid artery     Osteoarthritis     Pain in both upper extremities     Atherosclerotic peripheral vascular disease with rest pain (H)     Essential hypertension     Cellulitis of right ankle     Angina pectoris, crescendo (H)     Type 2 diabetes mellitus with diabetic peripheral angiopathy without gangrene, without long-term current use of insulin (H)     Anemia, unspecified type     Critical lower limb ischemia     Testicular feminization     Anxiety disorder due to general medical condition     Central retinal artery occlusion     Lumbosacral radiculitis     Peripheral neuropathy     Osteopenia     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 (H)     Primary open  angle glaucoma of both eyes, severe stage     Pseudophakia of right eye     Cataract, left eye     Diabetes mellitus type 2 without retinopathy (H)     Pyelonephritis     Chronic obstructive pulmonary disease, unspecified COPD type (H)     JOSE (obstructive sleep apnea)     Complex sleep apnea syndrome     Coronary artery disease of native artery of native heart with stable angina pectoris (H)     Hyperlipidemia     Ischemic cardiomyopathy     Bee sting reaction, undetermined intent, subsequent encounter     Functional incontinence     Personal history of urinary tract infection     Dysphagia     Single seizure (H)     Essential hypertension with goal blood pressure less than 130/80     Hyperlipidemia LDL goal <70     UTI (urinary tract infection)     Food impaction of esophagus     Esophageal foreign body     Nausea & vomiting     Incontinence     Black tarry stools     Other migraine without status migrainosus, not intractable     Past Surgical History:   Procedure Laterality Date     AMPUTATE LEG ABOVE KNEE Left 6/11/2016    Procedure: AMPUTATE LEG ABOVE KNEE;  Surgeon: Mello Rodriguez MD;  [facility]     AMPUTATE LEG BELOW KNEE Right 11/7/2016    Procedure: AMPUTATE LEG BELOW KNEE;  Surgeon: Savannah Durant MD;  [facility]     AMPUTATE REVISION STUMP LOWER EXTREMITY Right 11/11/2016    Procedure: AMPUTATE REVISION STUMP LOWER EXTREMITY;  Surgeon: Savannah Durant MD;  [facility]     AMPUTATE REVISION STUMP LOWER EXTREMITY Right 11/16/2016    Procedure: AMPUTATE REVISION STUMP LOWER EXTREMITY;  Surgeon: Savannah Durant MD;  [facility]     AMPUTATE TOE(S) Right 1/5/2016    Procedure: AMPUTATE TOE(S);  Surgeon: Mello Gaines DPM;  Location: [facility]     ANGIOGRAM Bilateral 11/21/2014    Procedure: ANGIOGRAM;  Surgeon: Savannah Durant MD;  [facility]     ANGIOGRAM Left 1/16/2015    Procedure: ANGIOGRAM;  Surgeon: Savannah Durant MD;  [facility]     ANGIOGRAM Bilateral 9/14/2015    Procedure:  ANGIOGRAM;  Surgeon: Savannah Durant MD;  [facility]     ANGIOGRAM Left 10/12/2015    Procedure: ANGIOGRAM;  Surgeon: Savannah Durant MD;  [facility]     ANGIOGRAM Right 6/6/2016    Procedure: ANGIOGRAM;  Surgeon: Savannah Durant MD;  [facility]     ANGIOPLASTY Right 6/6/2016    Procedure: ANGIOPLASTY;  Surgeon: Savannah Durant MD;  [facility]     APPENDECTOMY       BREAST SURGERY      right breast bx (benign)     CATARACT IOL, RT/LT Right      CHOLECYSTECTOMY       COLONOSCOPY N/A 8/25/2014    Procedure: COLONOSCOPY;  Surgeon: Mello Ferrer MD;  [facility]     COLONOSCOPY WITH CO2 INSUFFLATION N/A 8/20/2014    Procedure: COLONOSCOPY WITH CO2 INSUFFLATION;  Surgeon: Duane, William Charles, MD;  Location: [facility]     CYSTOSTOMY, INSERT TUBE SUPRAPUBIC, COMBINED N/A 1/16/2018    Procedure: COMBINED CYSTOSTOMY, INSERT TUBE SUPRAPUBIC;  Cystoscopy, Intraoperative Ultrasound, Suprapubic Tube Placement;  Surgeon: Keanu Dawson MD;  [facility]     ENDARTERECTOMY FEMORAL  5/23/2014    Procedure: ENDARTERECTOMY FEMORAL;  Surgeon: Jason Joshi MD;  [facility]     ESOPHAGOSCOPY, GASTROSCOPY, DUODENOSCOPY (EGD), COMBINED  12/14/2012    Procedure: COMBINED ESOPHAGOSCOPY, GASTROSCOPY, DUODENOSCOPY (EGD), BIOPSY SINGLE OR MULTIPLE;  ESOPHAGOSCOPY, GASTROSCOPY, DUODENOSCOPY (EGD), DILATATION ;  Surgeon: Elizabeth Stevenson MD;  Location:  GI     ESOPHAGOSCOPY, GASTROSCOPY, DUODENOSCOPY (EGD), COMBINED  12/31/2013    Procedure: COMBINED ESOPHAGOSCOPY, GASTROSCOPY, DUODENOSCOPY (EGD), BIOPSY SINGLE OR MULTIPLE;;  Surgeon: Clemetne Lopez MD;  [facility]     ESOPHAGOSCOPY, GASTROSCOPY, DUODENOSCOPY (EGD), COMBINED  4/1/2014    Procedure: COMBINED ESOPHAGOSCOPY, GASTROSCOPY, DUODENOSCOPY (EGD);;  Surgeon: Clemente Lopez MD;  [facility]     ESOPHAGOSCOPY, GASTROSCOPY, DUODENOSCOPY (EGD), COMBINED  6/28/2014    Procedure: COMBINED ESOPHAGOSCOPY, GASTROSCOPY, DUODENOSCOPY (EGD);   Surgeon: Clemente Lopez MD;  [facility]     ESOPHAGOSCOPY, GASTROSCOPY, DUODENOSCOPY (EGD), COMBINED N/A 8/20/2014    Procedure: COMBINED ESOPHAGOSCOPY, GASTROSCOPY, DUODENOSCOPY (EGD), BIOPSY SINGLE OR MULTIPLE;  Surgeon: Duane, William Charles, MD;  [facility]     ESOPHAGOSCOPY, GASTROSCOPY, DUODENOSCOPY (EGD), COMBINED N/A 8/22/2014    Procedure: COMBINED ESOPHAGOSCOPY, GASTROSCOPY, DUODENOSCOPY (EGD), BIOPSY SINGLE OR MULTIPLE;  Surgeon: Mello eFrrer MD;  [facility]     ESOPHAGOSCOPY, GASTROSCOPY, DUODENOSCOPY (EGD), COMBINED N/A 10/2/2014    Procedure: COMBINED ESOPHAGOSCOPY, GASTROSCOPY, DUODENOSCOPY (EGD), BIOPSY SINGLE OR MULTIPLE;  Surgeon: Remy Haskins MD;  [facility]     ESOPHAGOSCOPY, GASTROSCOPY, DUODENOSCOPY (EGD), COMBINED Left 12/15/2014    Procedure: COMBINED ESOPHAGOSCOPY, GASTROSCOPY, DUODENOSCOPY (EGD), BIOPSY SINGLE OR MULTIPLE;  Surgeon: Remy Haskins MD;  [facility]     ESOPHAGOSCOPY, GASTROSCOPY, DUODENOSCOPY (EGD), COMBINED N/A 2/25/2015    Procedure: COMBINED ENDOSCOPIC ULTRASOUND, ESOPHAGOSCOPY, GASTROSCOPY, DUODENOSCOPY (EGD), FINE NEEDLE ASPIRATE/BIOPSY;  Surgeon: Clemente Lugo MD;  [facility]     ESOPHAGOSCOPY, GASTROSCOPY, DUODENOSCOPY (EGD), COMBINED Left 2/25/2015    Procedure: COMBINED ESOPHAGOSCOPY, GASTROSCOPY, DUODENOSCOPY (EGD), BIOPSY SINGLE OR MULTIPLE;  Surgeon: Clemente Lugo MD;  [facility]     ESOPHAGOSCOPY, GASTROSCOPY, DUODENOSCOPY (EGD), COMBINED N/A 9/25/2016    Procedure: COMBINED ESOPHAGOSCOPY, GASTROSCOPY, DUODENOSCOPY (EGD);  Surgeon: Aziza Patiño MD;  [facility]     ESOPHAGOSCOPY, GASTROSCOPY, DUODENOSCOPY (EGD), COMBINED N/A 1/18/2017    Procedure: COMBINED ESOPHAGOSCOPY, GASTROSCOPY, DUODENOSCOPY (EGD), BIOPSY SINGLE OR MULTIPLE;  Surgeon: Clemente Lopez MD;  Location:  [facility]     ESOPHAGOSCOPY, GASTROSCOPY, DUODENOSCOPY (EGD), COMBINED N/A 11/26/2017    Procedure: COMBINED  ESOPHAGOSCOPY, GASTROSCOPY, DUODENOSCOPY (EGD), REMOVE FOREIGN BODY;  Esophagogastroduodenoscopy with foreign body extraction  ;  Surgeon: Herberth Castrejon MD;  [facility]     ESOPHAGOSCOPY, GASTROSCOPY, DUODENOSCOPY (EGD), COMBINED N/A 11/26/2017    Procedure: COMBINED ESOPHAGOSCOPY, GASTROSCOPY, DUODENOSCOPY (EGD), REMOVE FOREIGN BODY;;  Surgeon: Herberth Castrejon MD;  [facility]     FASCIOTOMY LOWER EXTREMITY Left 6/10/2016    Procedure: FASCIOTOMY LOWER EXTREMITY;  Surgeon: Mello Rodriguez MD;  [facility]     HC CAPSULE ENDOSCOPY N/A 8/25/2014    Procedure: CAPSULE/PILL CAM ENDOSCOPY;  Surgeon: Remy Haskins MD;  [facility]     HC CAPSULE ENDOSCOPY N/A 10/2/2014    Procedure: CAPSULE/PILL CAM ENDOSCOPY;  Surgeon: Remy Haskins MD;  [facility]     ORTHOPEDIC SURGERY      broken wrist repair     SEX TRANSFORMATION SURGERY, MALE TO FEMALE      1974     SINUS SURGERY      cyst removed     TONSILLECTOMY       VASCULAR SURGERY      Left carotid stent       Social History   Substance Use Topics     Smoking status: Former Smoker     Packs/day: 2.50     Years: 30.00     Types: Cigarettes, Cigars     Quit date: 11/1/2001     Smokeless tobacco: Never Used     Alcohol use No     Family History   Problem Relation Age of Onset     Dementia Mother      Glaucoma Mother      DIABETES Mother      may have been type 1, childhood     Coronary Artery Disease Mother      MI     Glaucoma Father      DIABETES Father      may hev been type 1 - ??     Heart Failure Father      CANCER Maternal Aunt      leukemia     Schizophrenia Brother      Depression Brother      Suicide Sister      Depression Sister      DIABETES Sister      CANCER Maternal Aunt      ovarian     Glaucoma Maternal Grandmother      DIABETES Maternal Grandmother      Glaucoma Maternal Grandfather      DIABETES Maternal Grandfather      Glaucoma Paternal Grandmother      DIABETES Paternal Grandmother      Glaucoma  Paternal Grandfather      DIABETES Paternal Grandfather      Breast Cancer Sister      CEREBROVASCULAR DISEASE Brother      Colon Cancer No family hx of      Macular Degeneration No family hx of          Current Outpatient Prescriptions   Medication Sig Dispense Refill     ACETAMINOPHEN PO Take 1,000 mg by mouth every 4 hours as needed for pain Not to exceed 4000 mg/day       ADVAIR DISKUS 250-50 MCG/DOSE diskus inhaler Inhale 1 puff into the lungs 2 times daily 60 Inhaler 11     albuterol (2.5 MG/3ML) 0.083% neb solution INHALE 1 VIAL VIA NEBULIZER EVERY 6 HOURS AS NEEDED 360 mL 11     aspirin EC 81 MG EC tablet Take 1 tablet (81 mg) by mouth daily 90 tablet 3     atorvastatin (LIPITOR) 40 MG tablet Take 1 tablet (40 mg) by mouth daily 90 tablet 2     blood glucose monitoring (ONE TOUCH ULTRA 2) meter device kit Use to test blood sugars 3 times daily or as directed. 1 kit 0     blood glucose monitoring (ONE TOUCH ULTRA) test strip Use to test blood sugars 3 times daily or as directed. 3 Box 3     blood glucose monitoring (ONE TOUCH ULTRASOFT) lancets Use to test blood sugar 3 times daily or as directed. 100 each PRN     brimonidine (ALPHAGAN) 0.2 % ophthalmic solution Place 1 drop into the right eye 2 times daily 1 Bottle 11     calcium citrate-vitamin D (CITRACAL) 315-250 MG-UNIT TABS Take 2 tablets by mouth daily 120 tablet 5     cetirizine (ZYRTEC) 10 MG tablet Take 1 tablet (10 mg) by mouth daily as needed for allergies 90 tablet 3     Cholecalciferol (VITAMIN D) 2000 UNITS tablet Take 2,000 Units by mouth daily. 100 tablet 3     CYANOCOBALAMIN PO Take 2,000 mcg by mouth daily       diclofenac (VOLTAREN) 1 % GEL topical gel Apply 2 g topically 4 times daily to hands 100 g 11     dorzolamide (TRUSOPT) 2 % ophthalmic solution Place 1 drop into the right eye 2 times daily 1 Bottle 11     EPINEPHrine (EPIPEN/ADRENACLICK/OR ANY BX GENERIC EQUIV) 0.3 MG/0.3ML injection 2-pack Inject 0.3 mLs (0.3 mg) into the muscle  as needed for anaphylaxis 0.6 mL 1     escitalopram (LEXAPRO) 10 MG tablet Take 10 mg by mouth daily        ESTRACE VAGINAL 0.1 MG/GM cream USE DIME SIZE AMOUNT 3X A WEEK AT NIGHT 42.5 g 11     estradiol (ESTRACE) 1 MG tablet Take 1 tablet (1 mg) by mouth daily 90 tablet 3     ferrous sulfate (IRON) 325 (65 FE) MG tablet Take 1 tablet (325 mg) by mouth 2 times daily With meals 60 tablet 2     fluticasone (FLONASE) 50 MCG/ACT nasal spray Spray 1 spray into both nostrils daily       guaiFENesin-codeine (ROBITUSSIN AC) 100-10 MG/5ML SOLN solution Take 5 mLs by mouth every 4 hours as needed for cough 120 mL 0     hydrochlorothiazide (MICROZIDE) 12.5 MG capsule Take 1 capsule (12.5 mg) by mouth daily 90 capsule 2     lactulose (CHRONULAC) 10 GM/15ML solution Take 20 g by mouth as needed for constipation       latanoprost (XALATAN) 0.005 % ophthalmic solution Place 1 drop into both eyes At Bedtime 2.5 mL 11     levETIRAcetam (KEPPRA) 500 MG tablet Take 1 tablet (500 mg) by mouth 2 times daily 180 tablet 1     lidocaine (LIDODERM) 5 % Patch APPLY 1 TO 3 PATCHES TO PAINFUL AREA AT ONCE FOR UP TO 12 HOURS WITHIN A 24 HOUR PERIOD REMOVE AFTER 12 HOURS 30 patch 5     lisinopril (PRINIVIL/ZESTRIL) 20 MG tablet Take 1 tablet (20 mg) by mouth daily 90 tablet 0     MEDICATION GIVEN BY INTRATHECAL PUMP - INSTRUCTION continuous 2/8/18: Pump managed by [facility] in [place] [phone].   Conc: Bupivacaine 20 mg/mL and Fentanyl 2000 mcg/mL, morphine 2 mg/mL  Continuous:  Fentanyl 796 mcg/day, Bupivacaine 7.96 mg/day, Morphine 0.796 mg/day   Boluses: Up to 7 boluses per 24-hr period of Bupivicaine 0.5994 mg and Fentanyl 59.9 mcg morphine 0.0599 mg.  Pump Refill Date 02/28/18       metFORMIN (GLUCOPHAGE-XR) 500 MG 24 hr tablet Take 1 tablet (500 mg) by mouth daily (with dinner) 90 tablet 3     metoprolol succinate (TOPROL-XL) 50 MG 24 hr tablet Take 1 tablet (50 mg) by mouth daily 90 tablet 0     Multiple  Vitamin (MULTIVITAMIN OR) Take 1 tablet by mouth daily        nicotine (NICODERM CQ) 14 MG/24HR 24 hr patch Place 1 patch onto the skin daily 30 patch 0     nitroglycerin (NITROSTAT) 0.4 MG SL tablet Place 1 tablet (0.4 mg) under the tongue every 5 minutes as needed for chest pain if you are still having symptoms after 3 doses (15 minutes) call 911. 25 tablet 1     ondansetron (ZOFRAN-ODT) 8 MG ODT tab Take 1 tablet (8 mg) by mouth every 8 hours as needed 30 tablet 5     ORDER FOR DME Equipment being ordered: Depends briefs 1 Month 11     ORDER FOR DME Equipment being ordered: Power Wheelchair 1 Device 0     order for DME Equipment being ordered: Glucerna daily shakes with each meal 1 Box 11     order for DME Equipment being ordered: Nebulizer and tubing supplies 1 Units 0     order for DME Equipment being ordered: CPAP supplies mask, hose, filters, cushion    fax to [facility] at [phone] 10 Device prn     order for DME Equipment being ordered: CPAP supplies mask, hose, filters, cushion fax to [facility] at [phone]  Disp: 10 Device Refills: prn   Class: Local Print Start: 2/10/2017 1 Device 0     order for DME Hospital bed with side rails 1 Device 0     order for DME Full electric hospital bed with half rails    Dx: Z12286, I110, J449  Length of need: lifetime 1 Device 0     order for DME Wheel Chair Cushion: 18 x 18 inch Roho cushion 1 Device 0     order for DME Equipment being ordered: bandage tape, prefer paper 1 Package 0     pantoprazole (PROTONIX) 40 MG EC tablet Take 1 tablet (40 mg) by mouth daily 30 tablet 5     phenytoin 200 MG CAPS Take 200 mg by mouth 2 times daily (Patient taking differently: Take 200 mg by mouth 2 times daily (takes at 8 AM and 8 PM)) 60 capsule 0     polyethylene glycol (MIRALAX/GLYCOLAX) Packet Take 17 g by mouth daily Dissolved in water or juice        pregabalin (LYRICA) 75 MG capsule Take 2 capsules (150 mg) by mouth 2 times daily 120 capsule 5      PROCHLORPERAZINE MALEATE PO Take 10 mg by mouth every 8 hours as needed for nausea or vomiting       risperiDONE (RISPERDAL) 0.5 MG tablet Take 0.5 mg by mouth At Bedtime       silver sulfADIAZINE (SILVADENE) 1 % cream Apply topically 2 times daily 50 g 0     sucralfate (CARAFATE) 1 GM tablet Take 1 tablet (1 g) by mouth 4 times daily May dissolve in 10 mL water is necessary. (Start upon completion of carafate suspension) 120 tablet 11     traZODone (DESYREL) 50 MG tablet Take 150 mg by mouth At Bedtime        umeclidinium (INCRUSE ELLIPTA) 62.5 MCG/INH oral inhaler Inhale 1 puff into the lungs daily       VENTOLIN  (90 BASE) MCG/ACT Inhaler Inhale 2 puffs into the lungs every 6 hours as needed for shortness of breath / dyspnea or wheezing 3 Inhaler 5     zinc 50 MG TABS Take 1 tablet by mouth daily       Allergies   Allergen Reactions     Bee Venom      Penicillins Anaphylaxis     Other reaction(s): Skin Rash and/or Hives     Dilantin [Phenytoin] Other (See Comments)     Generic dilantin only per pt     Iodide Hives     09/11/15: Pt states she can use iodine and doesn't have any problems      Iodine-131      Novocaine [Procaine] Hives     Other reaction(s): Skin Rash and/or Hives     Tositumomab      BP Readings from Last 3 Encounters:   05/09/18 142/72   05/04/18 135/80   05/03/18 153/77    Wt Readings from Last 3 Encounters:   05/09/18 138 lb (62.6 kg)   05/02/18 131 lb 6.4 oz (59.6 kg)   04/18/18 123 lb (55.8 kg)                    Reviewed and updated as needed this visit by clinical staff       Reviewed and updated as needed this visit by Provider       ROS:  CONSTITUTIONAL: NEGATIVE for fever, chills, change in weight  ENT/MOUTH: NEGATIVE for ear, mouth and throat problems  CV: NEGATIVE for chest pain, palpitations or peripheral edema  GI: NEGATIVE for nausea, abdominal pain, heartburn, or change in bowel habits  : NEGATIVE for frequency, dysuria, or hematuria  MUSCULOSKELETAL: NEGATIVE for  "significant arthralgias or myalgia  HEME: NEGATIVE for bleeding problems  PSYCHIATRIC: NEGATIVE for changes in mood or affect    OBJECTIVE:                                                    /72  Pulse 74  Temp 98.6  F (37  C) (Oral)  Resp 17  Ht 3' 7\" (1.092 m)  Wt 138 lb (62.6 kg)  BMI 52.47 kg/m2  Body mass index is 52.47 kg/(m^2).  GENERAL: alert and no distress in wheelchair  EYES: Eyes grossly normal to inspection, extraocular movements - intact, and PERRL  HENT: ear canals- normal; TMs- normal; Nose- normal; Mouth- no ulcers, no lesions  NECK: no tenderness, no adenopathy, no asymmetry, no masses, no stiffness; thyroid- normal to palpation  RESP: lungs clear to auscultation - no rales, no rhonchi, no wheezes  CV: regular rates and rhythm, normal S1 S2, no S3 or S4 and no click or rub   MS: noted AKA bilaterally  NEURO:  No focal changes  PSYCH: Alert and oriented times 3; speech- coherent , normal rate and volume; able to articulate logical thoughts, able to abstract reason, no tangential thoughts, no hallucinations or delusions, affect- normal       ASSESSMENT/PLAN:                                                      (Z09) Hospital discharge follow-up  (primary encounter diagnosis)  Comment: Stable and appears to be doing relatively well  Plan:     (R07.89) Atypical chest pain  Comment: No change in symptoms but advised that she needs to be followed up and be seen by her cardiologist which is already scheduled.  Plan:     (T30.0) Burn  Comment: Renewing with topical Silvadene cream on a daily  Plan: silver sulfADIAZINE (SILVADENE) 1 % cream            (E66.01) Morbid obesity (H)  Comment: Discussed and encouraged weight loss  Plan:     (J06.9) Upper respiratory tract infection, unspecified type  Comment: Advise continued smoking cessation  Plan: guaiFENesin-codeine (ROBITUSSIN AC) 100-10         MG/5ML SOLN solution            (Z86.79) History of coronary artery disease  Comment: Stable on " current therapy  Plan: lisinopril (PRINIVIL/ZESTRIL) 20 MG tablet,         metoprolol succinate (TOPROL-XL) 50 MG 24 hr         tablet            (F17.200) Tobacco dependence syndrome  Comment: Smoking cessation was advised and the risks of continued smoking in regards to this patients health history was reiterated. Options of smoking cessation were also discussed. This time extended beyond the routine exam.  Plan: nicotine (NICODERM CQ) 14 MG/24HR 24 hr patch          See Patient Instructions    Allan Casey MD  [facility]    THE MEDICATION LIST HAS BEEN FULLY RECONCILED BY THE M.D. AND THE NURSING STAFF.

## 2018-05-15 ENCOUNTER — HOSPITAL ENCOUNTER (OUTPATIENT)
Dept: OCCUPATIONAL THERAPY | Facility: CLINIC | Age: 68
Setting detail: THERAPIES SERIES
End: 2018-05-15
Attending: INTERNAL MEDICINE
Payer: COMMERCIAL

## 2018-05-15 PROCEDURE — 40000249 ZZH STATISTIC VISIT LOW VISION CLINIC: Performed by: OCCUPATIONAL THERAPIST

## 2018-05-15 PROCEDURE — 97535 SELF CARE MNGMENT TRAINING: CPT | Mod: GO | Performed by: OCCUPATIONAL THERAPIST

## 2018-05-15 NOTE — DISCHARGE INSTRUCTIONS
Visual Rehabilitation Summary    05/15/2018  Cell Phone:   Settings   General   Accessibility  1. We turned on Zoom.  You activate Zoom by tapping the screen 2 times with 3 finger pads.  a. When zoom is on, you have to use 3 fingers to scroll around the screen  b. To turn Zoom off, tap 2X with 3 finger pads.  2. We increased the font size  3. The other feature in accessibility is voice over. When voice over is on, it will read whatever you touch on the screen.  If you want to open something, you have to tap it twice   a. You can ask Dayami to turn voice over on and off  4. Dayami - we turned on the  hey Dayami  function.  Now you do not need to hold the button to activate Dayami.   5. We downloaded Seeing  application. It was free.  When you hold you phone over text it will start reading it when Seeing  is on.  6. Have someone help you install the email so Dayami can send emails for you.  Manuela Mitchell, OTR/L  [phone]    [facility] [address] [id number] lm Dual Color Focusing LED Headlamp     List Price: $59.99    Price: $44.20 & FREE Shipping.Details    You Save: $15.79 (26%)   In Stock.     Want it tomorrow, May 16? Order within 1 hr 44 mins and choose One-Day Shipping at checkout. Details

## 2018-05-16 NOTE — ADDENDUM NOTE
Encounter addended by: Manuela Mitchell OT on: 5/16/2018 11:41 AM<BR>     Actions taken: Flowsheet accepted

## 2018-05-17 ENCOUNTER — OFFICE VISIT (OUTPATIENT)
Dept: CARDIOLOGY | Facility: CLINIC | Age: 68
End: 2018-05-17
Payer: COMMERCIAL

## 2018-05-17 VITALS
DIASTOLIC BLOOD PRESSURE: 63 MMHG | SYSTOLIC BLOOD PRESSURE: 100 MMHG | BODY MASS INDEX: 31.94 KG/M2 | WEIGHT: 138 LBS | HEIGHT: 55 IN | HEART RATE: 75 BPM

## 2018-05-17 DIAGNOSIS — Z86.79 HISTORY OF CORONARY ARTERY DISEASE: ICD-10-CM

## 2018-05-17 DIAGNOSIS — I10 ESSENTIAL HYPERTENSION: ICD-10-CM

## 2018-05-17 DIAGNOSIS — Z72.0 TOBACCO ABUSE: ICD-10-CM

## 2018-05-17 DIAGNOSIS — I25.5 ISCHEMIC CARDIOMYOPATHY: ICD-10-CM

## 2018-05-17 DIAGNOSIS — E78.5 HYPERLIPIDEMIA LDL GOAL <70: ICD-10-CM

## 2018-05-17 DIAGNOSIS — I25.118 CORONARY ARTERY DISEASE OF NATIVE ARTERY OF NATIVE HEART WITH STABLE ANGINA PECTORIS (H): Primary | ICD-10-CM

## 2018-05-17 LAB
ANION GAP SERPL CALCULATED.3IONS-SCNC: 11.7 MMOL/L (ref 6–17)
BUN SERPL-MCNC: 8 MG/DL (ref 7–30)
CALCIUM SERPL-MCNC: 9.5 MG/DL (ref 8.5–10.5)
CHLORIDE SERPL-SCNC: 99 MMOL/L (ref 98–107)
CO2 SERPL-SCNC: 29 MMOL/L (ref 23–29)
CREAT SERPL-MCNC: 0.7 MG/DL (ref 0.7–1.3)
GFR SERPL CREATININE-BSD FRML MDRD: 83 ML/MIN/1.7M2
GLUCOSE SERPL-MCNC: 91 MG/DL (ref 70–105)
POTASSIUM SERPL-SCNC: 3.7 MMOL/L (ref 3.5–5.1)
SODIUM SERPL-SCNC: 136 MMOL/L (ref 136–145)

## 2018-05-17 PROCEDURE — 80048 BASIC METABOLIC PNL TOTAL CA: CPT | Performed by: NURSE PRACTITIONER

## 2018-05-17 PROCEDURE — 99214 OFFICE O/P EST MOD 30 MIN: CPT | Performed by: NURSE PRACTITIONER

## 2018-05-17 PROCEDURE — 36415 COLL VENOUS BLD VENIPUNCTURE: CPT | Performed by: NURSE PRACTITIONER

## 2018-05-17 NOTE — PATIENT INSTRUCTIONS
Thanks for coming into [facility] today.    Reviewed results of angiogram and echo results.   Strength of your heart has improved to normal.   Continue with current medical therapy.  Recommend monitoring blood pressures at home   Follow up with primary cardiologist in 2 weeks.                 Please call my nurse at [phone] with any questions or concerns.    Scheduling phone number: [phone]  Reminder: Please bring in all current medications, over the counter supplements and vitamin bottles to your next appointment.

## 2018-05-17 NOTE — PROGRESS NOTES
"Cardiology Clinic Progress Note  Sonya Foote MRN# [id number]   YOB: 1950 Age: 68 year old     Reason For Visit:  Hospital discharge follow up   Primary Cardiologist:   Dr. Anderson          History of Presenting Illness:    Sonya Foote is a pleasant 68 year old patient with a history significant for CAD s/p stent RCA, MI, ejection fraction fell to 35%-40% (2016) from previous 60%, chronic dysphasia, esophageal strictures and previous dilatations, diabetes, bilateral above the knee amputations (2016), tobacco abuse, COPD, hypertension, carotid disease, and CVA.  On May 2, 2018 she presented to the emergency room with complaints of substernal left-sided chest pain with radiation to the left arm.  Her cardiac workup consisted of an angiogram that confirmed moderate in-stent re-narrowing and additional disease in the right coronary artery which appeared to be non-flow-limiting.  Her left coronary anatomy confirmed just mild disease.  Her follow-up echo confirmed hyperdynamic left ventricular function, visual ejection fraction estimated at greater than 70%, no regional wall motion abnormalities, no significant valvular disease.  She was discharged on an increased dose of lisinopril 20 mg daily and 50 mg Toprol daily for better blood pressure management. She comes to the office today for hospital discharge follow up.     She continues to complain of occasional chest pain and shortness of breath, improved with the help of nebulizers.  She denies PND, orthopnea, presyncope, syncope, heart racing, or palpitations.  She admits to spilling hot coffee on her right groin resulting in a superficial burn, which she continues to dressed with Silvadene.      Blood pressure today is on the soft side 100/63, with heart rate 75.  She states her home pressures are \"up and down\" .  Since the increase in both her lisinopril and Toprol,  she does report occasional episodes of lightheadedness.    Unfortunately she continues to " "smoke, although admits she has not smoked for the past 3 days and is currently wearing a nicotine patch.  She states she is still grieving the loss of her wife who passed away this past year.  She is planning to move to [place] in August where she will live with her sister.    She remains compliant with all medications and CPAP.                   Assessment and Plan:     (I25.118) Coronary artery disease of native artery of native heart with stable angina pectoris (H)  (primary encounter diagnosis)  Comment: Stable,  No chest pain at present. Angiogram confirms moderate in-stent re-narrowing and additional disease in the right coronary artery which appeared to be non-flow-limiting.  Her left coronary anatomy confirmed just mild disease.    Plan: Continue on aspirin, protonix, Metoprolol, lisinopril and statin.     (I25.5) Ischemic cardiomyopathy  Comment:  Recent echo confirmed hyperdynamic left ventricular function, visual ejection fraction estimated at greater than 70%, (prior LV evaluation 35-40%) no regional wall motion abnormalities, no significant valvular disease.   Plan: Continue on current medical therapy.     (I10) Essential hypertension  Comment: Borderline low today with occasional c/o lightheadedness. Monitor symptom and home blood pressures as she states her BP is \" up and down\".  Plan:  If BP remains on the lower side recommend decreasing her lisinopril to 10mg daily.       (E78.5) Hyperlipidemia LDL goal <70  Comment: Due for lipids, will get prior to appointment on 6/26/18  Plan: Continue on Lipitor. Heart healthy diet.     (Z72.0) Tobacco abuse  Comment: Encourage smoking cessation  Plan: Continue with nicotine patch.               Thank you for allowing me to participate in this delightful patient's care.      This note was completed in part using Dragon voice recognition software. Although reviewed after completion, some word and grammatical errors may occur.    Inge Kerns, VICTOR M, CNP          " "Review of Systems:   Review of Systems:  Skin:  Positive for bruising   Eyes:  Positive for glasses;glaucoma  ENT:  Negative    Respiratory:  Positive for dyspnea on exertion;cough  Cardiovascular:    Positive for;lightheadedness;palpitations  Gastroenterology: Positive for heartburn;reflux  Genitourinary:  not assessed    Musculoskeletal:  Positive for back pain;joint pain;arthritis  Neurologic:  Positive for headaches  Psychiatric:  Negative    Heme/Lymph/Imm:  Positive for allergies  Endocrine:  Positive for diabetes              Physical Exam:     Vitals: /63  Pulse 75  Ht 1.092 m (3' 7\")  Wt 62.6 kg (138 lb)  BMI 52.47 kg/m2  Constitutional:  cooperative, alert and oriented, well developed, well nourished, in no acute distress        Skin:  warm and dry to the touch   Healing burn to right groin. Right angiogram site stable     Head:  normocephalic, no masses or lesions        Eyes:  not assessed this visit        ENT:  no pallor or cyanosis poor dentition      Neck:  carotid pulses are full and equal bilaterally        Chest:  normal breath sounds, clear to auscultation, normal A-P diameter, normal symmetry, normal respiratory excursion, no use of accessory muscles        Cardiac: regular rhythm, normal S1/S2, no S3 or S4, apical impulse not displaced, no murmurs, gallops or rubs     no presence of murmur            Abdomen:  abdomen soft   Suprapubic catheter.     Vascular:     2+                           right femoral bruit (-)      Extremities and Back:      Bilateral above the knee amputation    Neurological:    dysphasia               Medications:     Current Outpatient Prescriptions   Medication Sig Dispense Refill     ACETAMINOPHEN PO Take 1,000 mg by mouth every 4 hours as needed for pain Not to exceed 4000 mg/day       ADVAIR DISKUS 250-50 MCG/DOSE diskus inhaler Inhale 1 puff into the lungs 2 times daily 60 Inhaler 11     albuterol (2.5 MG/3ML) 0.083% neb solution INHALE 1 VIAL VIA " NEBULIZER EVERY 6 HOURS AS NEEDED 360 mL 11     aspirin EC 81 MG EC tablet Take 1 tablet (81 mg) by mouth daily 90 tablet 3     atorvastatin (LIPITOR) 40 MG tablet Take 1 tablet (40 mg) by mouth daily 90 tablet 2     blood glucose monitoring (ONE TOUCH ULTRA 2) meter device kit Use to test blood sugars 3 times daily or as directed. 1 kit 0     blood glucose monitoring (ONE TOUCH ULTRA) test strip Use to test blood sugars 3 times daily or as directed. 3 Box 3     blood glucose monitoring (ONE TOUCH ULTRASOFT) lancets Use to test blood sugar 3 times daily or as directed. 100 each PRN     brimonidine (ALPHAGAN) 0.2 % ophthalmic solution Place 1 drop into the right eye 2 times daily 1 Bottle 11     calcium citrate-vitamin D (CITRACAL) 315-250 MG-UNIT TABS Take 2 tablets by mouth daily 120 tablet 5     cetirizine (ZYRTEC) 10 MG tablet Take 1 tablet (10 mg) by mouth daily as needed for allergies 90 tablet 3     Cholecalciferol (VITAMIN D) 2000 UNITS tablet Take 2,000 Units by mouth daily. 100 tablet 3     CYANOCOBALAMIN PO Take 2,000 mcg by mouth daily       diclofenac (VOLTAREN) 1 % GEL topical gel Apply 2 g topically 4 times daily to hands 100 g 11     dorzolamide (TRUSOPT) 2 % ophthalmic solution Place 1 drop into the right eye 2 times daily 1 Bottle 11     EPINEPHrine (EPIPEN/ADRENACLICK/OR ANY BX GENERIC EQUIV) 0.3 MG/0.3ML injection 2-pack Inject 0.3 mLs (0.3 mg) into the muscle as needed for anaphylaxis 0.6 mL 1     escitalopram (LEXAPRO) 10 MG tablet Take 10 mg by mouth daily        ESTRACE VAGINAL 0.1 MG/GM cream USE DIME SIZE AMOUNT 3X A WEEK AT NIGHT 42.5 g 11     estradiol (ESTRACE) 1 MG tablet Take 1 tablet (1 mg) by mouth daily 90 tablet 3     ferrous sulfate (IRON) 325 (65 FE) MG tablet Take 1 tablet (325 mg) by mouth 2 times daily With meals 60 tablet 2     fluticasone (FLONASE) 50 MCG/ACT nasal spray Spray 1 spray into both nostrils daily       guaiFENesin-codeine (ROBITUSSIN AC) 100-10 MG/5ML SOLN  solution Take 5 mLs by mouth every 4 hours as needed for cough 120 mL 0     hydrochlorothiazide (MICROZIDE) 12.5 MG capsule Take 1 capsule (12.5 mg) by mouth daily 90 capsule 2     lactulose (CHRONULAC) 10 GM/15ML solution Take 20 g by mouth as needed for constipation       latanoprost (XALATAN) 0.005 % ophthalmic solution Place 1 drop into both eyes At Bedtime 2.5 mL 11     levETIRAcetam (KEPPRA) 500 MG tablet Take 1 tablet (500 mg) by mouth 2 times daily 180 tablet 1     lidocaine (LIDODERM) 5 % Patch APPLY 1 TO 3 PATCHES TO PAINFUL AREA AT ONCE FOR UP TO 12 HOURS WITHIN A 24 HOUR PERIOD REMOVE AFTER 12 HOURS 30 patch 5     lisinopril (PRINIVIL/ZESTRIL) 20 MG tablet Take 1 tablet (20 mg) by mouth daily 90 tablet 0     MEDICATION GIVEN BY INTRATHECAL PUMP - INSTRUCTION continuous 2/8/18: Pump managed by [facility] in [place] [phone].   Conc: Bupivacaine 20 mg/mL and Fentanyl 2000 mcg/mL, morphine 2 mg/mL  Continuous:  Fentanyl 796 mcg/day, Bupivacaine 7.96 mg/day, Morphine 0.796 mg/day   Boluses: Up to 7 boluses per 24-hr period of Bupivicaine 0.5994 mg and Fentanyl 59.9 mcg morphine 0.0599 mg.  Pump Refill Date 02/28/18       metFORMIN (GLUCOPHAGE-XR) 500 MG 24 hr tablet Take 1 tablet (500 mg) by mouth daily (with dinner) 90 tablet 3     metoprolol succinate (TOPROL-XL) 50 MG 24 hr tablet Take 1 tablet (50 mg) by mouth daily 90 tablet 0     Multiple Vitamin (MULTIVITAMIN OR) Take 1 tablet by mouth daily        nicotine (NICODERM CQ) 14 MG/24HR 24 hr patch Place 1 patch onto the skin daily 30 patch 0     nitroglycerin (NITROSTAT) 0.4 MG SL tablet Place 1 tablet (0.4 mg) under the tongue every 5 minutes as needed for chest pain if you are still having symptoms after 3 doses (15 minutes) call 911. 25 tablet 1     ondansetron (ZOFRAN-ODT) 8 MG ODT tab Take 1 tablet (8 mg) by mouth every 8 hours as needed 30 tablet 5     ORDER FOR DME Equipment being ordered: Depends briefs 1 Month 11      ORDER FOR DME Equipment being ordered: Power Wheelchair 1 Device 0     order for DME Equipment being ordered: Glucerna daily shakes with each meal 1 Box 11     order for DME Equipment being ordered: Nebulizer and tubing supplies 1 Units 0     order for DME Equipment being ordered: CPAP supplies mask, hose, filters, cushion    fax to [facility] at [phone] 10 Device prn     order for DME Equipment being ordered: CPAP supplies mask, hose, filters, cushion fax to [facility] at [phone]  Disp: 10 Device Refills: prn   Class: Local Print Start: 2/10/2017 1 Device 0     order for DME Hospital bed with side rails 1 Device 0     order for DME Full electric hospital bed with half rails    Dx: J80307, I110, J449  Length of need: lifetime 1 Device 0     order for DME Wheel Chair Cushion: 18 x 18 inch Roho cushion 1 Device 0     order for DME Equipment being ordered: bandage tape, prefer paper 1 Package 0     pantoprazole (PROTONIX) 40 MG EC tablet Take 1 tablet (40 mg) by mouth daily 30 tablet 5     phenytoin 200 MG CAPS Take 200 mg by mouth 2 times daily (Patient taking differently: Take 200 mg by mouth 2 times daily (takes at 8 AM and 8 PM)) 60 capsule 0     polyethylene glycol (MIRALAX/GLYCOLAX) Packet Take 17 g by mouth daily Dissolved in water or juice        pregabalin (LYRICA) 75 MG capsule Take 2 capsules (150 mg) by mouth 2 times daily 120 capsule 5     PROCHLORPERAZINE MALEATE PO Take 10 mg by mouth every 8 hours as needed for nausea or vomiting       risperiDONE (RISPERDAL) 0.5 MG tablet Take 0.5 mg by mouth At Bedtime       silver sulfADIAZINE (SILVADENE) 1 % cream Apply topically 2 times daily 50 g 0     sucralfate (CARAFATE) 1 GM tablet Take 1 tablet (1 g) by mouth 4 times daily May dissolve in 10 mL water is necessary. (Start upon completion of carafate suspension) 120 tablet 11     traZODone (DESYREL) 50 MG tablet Take 150 mg by mouth At Bedtime        umeclidinium (INCRUSE ELLIPTA) 62.5  MCG/INH oral inhaler Inhale 1 puff into the lungs daily       VENTOLIN  (90 BASE) MCG/ACT Inhaler Inhale 2 puffs into the lungs every 6 hours as needed for shortness of breath / dyspnea or wheezing 3 Inhaler 5     zinc 50 MG TABS Take 1 tablet by mouth daily             Family History   Problem Relation Age of Onset     Dementia Mother      Glaucoma Mother      DIABETES Mother      may have been type 1, childhood     Coronary Artery Disease Mother      MI     Glaucoma Father      DIABETES Father      may hev been type 1 - ??     Heart Failure Father      CANCER Maternal Aunt      leukemia     Schizophrenia Brother      Depression Brother      Suicide Sister      Depression Sister      DIABETES Sister      CANCER Maternal Aunt      ovarian     Glaucoma Maternal Grandmother      DIABETES Maternal Grandmother      Glaucoma Maternal Grandfather      DIABETES Maternal Grandfather      Glaucoma Paternal Grandmother      DIABETES Paternal Grandmother      Glaucoma Paternal Grandfather      DIABETES Paternal Grandfather      Breast Cancer Sister      CEREBROVASCULAR DISEASE Brother      Colon Cancer No family hx of      Macular Degeneration No family hx of        Social History     Social History     Marital status:      Spouse name: N/A     Number of children: 2     Years of education: N/A     Occupational History     retired      retired     Social History Main Topics     Smoking status: Former Smoker     Packs/day: 2.50     Years: 30.00     Types: Cigarettes, Cigars     Quit date: 11/1/2001     Smokeless tobacco: Never Used     Alcohol use No     Drug use: No     Sexual activity: Not Currently     Other Topics Concern     Caffeine Concern No     1 in the morning     Social History Narrative      Her father was in the  and she moved around a lot when she was a kid, and has lived abroad including in [facility] and the [facility].  She was previously employed as a mental health worker. Moved from  [place] to [place] in 2012. She is currently living in assisted living ([facility] in [facility]).  Wife passed away 6/2017.            She has 2 adopted children (Kam and Prashanth)            Past Medical History:     Past Medical History:   Diagnosis Date     Anemia      Antiplatelet or antithrombotic long-term use      Arthritis      AS (sickle cell trait) (H) 10/8/2013     Blind left eye      BPPV (benign paroxysmal positional vertigo)      Chronic back pain      COPD (chronic obstructive pulmonary disease) (H)      Coronary artery disease      CVA (cerebral infarction) 10/8/2012    x3, residual left sided weakness     Diastolic CHF (H) 9/4/2012     Dilantin toxicity 5/31/2013     Esophageal candidiasis (H)      GERD (gastroesophageal reflux disease)      Heart attack      Hernia, abdominal      History of blood transfusion     x5     History of thrombophlebitis      HTN (hypertension) 9/4/2012     Hyperlipidemia LDL goal <100 9/4/2012     Legally blind in right eye, as defined in [place]     No periphal vision right eye     Osteoporosis 1/21/2013     Other chronic pain      PAD (peripheral artery disease) (H)      Palpitations      Person who has had sex change operation 1/20/2014     Pneumonia      Schizoaffective disorder (H)      Seizure disorder (H) 9/4/2012     Sleep apnea     CPAP     Thrombosis of leg      Type 2 diabetes, HbA1C goal < 8% (H) 9/4/2012     Uncomplicated asthma      Unspecified cerebral artery occlusion with cerebral infarction               Past Surgical History:     Past Surgical History:   Procedure Laterality Date     AMPUTATE LEG ABOVE KNEE Left 6/11/2016    Procedure: AMPUTATE LEG ABOVE KNEE;  Surgeon: Mello Rodriguez MD;  [facility]     AMPUTATE LEG BELOW KNEE Right 11/7/2016    Procedure: AMPUTATE LEG BELOW KNEE;  Surgeon: Savannah Durant MD;  [facility]     AMPUTATE REVISION STUMP LOWER EXTREMITY Right 11/11/2016    Procedure: AMPUTATE REVISION STUMP LOWER  EXTREMITY;  Surgeon: Savannah Durant MD;  [facility]     AMPUTATE REVISION STUMP LOWER EXTREMITY Right 11/16/2016    Procedure: AMPUTATE REVISION STUMP LOWER EXTREMITY;  Surgeon: Savannah Durant MD;  [facility]     AMPUTATE TOE(S) Right 1/5/2016    Procedure: AMPUTATE TOE(S);  Surgeon: Mello Gaines DPM;  Location: [facility]     ANGIOGRAM Bilateral 11/21/2014    Procedure: ANGIOGRAM;  Surgeon: Savannah Durant MD;  [facility]     ANGIOGRAM Left 1/16/2015    Procedure: ANGIOGRAM;  Surgeon: Savannah uDrant MD;  [facility]     ANGIOGRAM Bilateral 9/14/2015    Procedure: ANGIOGRAM;  Surgeon: Savannah Durant MD;  [facility]     ANGIOGRAM Left 10/12/2015    Procedure: ANGIOGRAM;  Surgeon: Savannah Durant MD;  [facility]     ANGIOGRAM Right 6/6/2016    Procedure: ANGIOGRAM;  Surgeon: Savannah Durant MD;  [facility]     ANGIOPLASTY Right 6/6/2016    Procedure: ANGIOPLASTY;  Surgeon: Savannah Durant MD;  [facility]     APPENDECTOMY       BREAST SURGERY      right breast bx (benign)     CATARACT IOL, RT/LT Right      CHOLECYSTECTOMY       COLONOSCOPY N/A 8/25/2014    Procedure: COLONOSCOPY;  Surgeon: Mello Ferrer MD;  [facility]     COLONOSCOPY WITH CO2 INSUFFLATION N/A 8/20/2014    Procedure: COLONOSCOPY WITH CO2 INSUFFLATION;  Surgeon: Duane, William Charles, MD;  Location: [facility]     CYSTOSTOMY, INSERT TUBE SUPRAPUBIC, COMBINED N/A 1/16/2018    Procedure: COMBINED CYSTOSTOMY, INSERT TUBE SUPRAPUBIC;  Cystoscopy, Intraoperative Ultrasound, Suprapubic Tube Placement;  Surgeon: Keanu Dawson MD;  [facility]     ENDARTERECTOMY FEMORAL  5/23/2014    Procedure: ENDARTERECTOMY FEMORAL;  Surgeon: Jason Joshi MD;  [facility]     ESOPHAGOSCOPY, GASTROSCOPY, DUODENOSCOPY (EGD), COMBINED  12/14/2012    Procedure: COMBINED ESOPHAGOSCOPY, GASTROSCOPY, DUODENOSCOPY (EGD), BIOPSY SINGLE OR MULTIPLE;  ESOPHAGOSCOPY, GASTROSCOPY, DUODENOSCOPY (EGD), DILATATION ;  Surgeon:  Elizabeth Stevenson MD;  Location:  GI     ESOPHAGOSCOPY, GASTROSCOPY, DUODENOSCOPY (EGD), COMBINED  12/31/2013    Procedure: COMBINED ESOPHAGOSCOPY, GASTROSCOPY, DUODENOSCOPY (EGD), BIOPSY SINGLE OR MULTIPLE;;  Surgeon: Clemente Lopez MD;  Location:  GI     ESOPHAGOSCOPY, GASTROSCOPY, DUODENOSCOPY (EGD), COMBINED  4/1/2014    Procedure: COMBINED ESOPHAGOSCOPY, GASTROSCOPY, DUODENOSCOPY (EGD);;  Surgeon: Clemente Lopez MD;  Location:  GI     ESOPHAGOSCOPY, GASTROSCOPY, DUODENOSCOPY (EGD), COMBINED  6/28/2014    Procedure: COMBINED ESOPHAGOSCOPY, GASTROSCOPY, DUODENOSCOPY (EGD);  Surgeon: Clemente Lopez MD;  Location:  GI     ESOPHAGOSCOPY, GASTROSCOPY, DUODENOSCOPY (EGD), COMBINED N/A 8/20/2014    Procedure: COMBINED ESOPHAGOSCOPY, GASTROSCOPY, DUODENOSCOPY (EGD), BIOPSY SINGLE OR MULTIPLE;  Surgeon: Duane, William Charles, MD;  Location: [facility]     ESOPHAGOSCOPY, GASTROSCOPY, DUODENOSCOPY (EGD), COMBINED N/A 8/22/2014    Procedure: COMBINED ESOPHAGOSCOPY, GASTROSCOPY, DUODENOSCOPY (EGD), BIOPSY SINGLE OR MULTIPLE;  Surgeon: Mello Ferrer MD;  Location:  GI     ESOPHAGOSCOPY, GASTROSCOPY, DUODENOSCOPY (EGD), COMBINED N/A 10/2/2014    Procedure: COMBINED ESOPHAGOSCOPY, GASTROSCOPY, DUODENOSCOPY (EGD), BIOPSY SINGLE OR MULTIPLE;  Surgeon: Remy Haskins MD;  Location:  GI     ESOPHAGOSCOPY, GASTROSCOPY, DUODENOSCOPY (EGD), COMBINED Left 12/15/2014    Procedure: COMBINED ESOPHAGOSCOPY, GASTROSCOPY, DUODENOSCOPY (EGD), BIOPSY SINGLE OR MULTIPLE;  Surgeon: Remy Haskins MD;  Location:  GI     ESOPHAGOSCOPY, GASTROSCOPY, DUODENOSCOPY (EGD), COMBINED N/A 2/25/2015    Procedure: COMBINED ENDOSCOPIC ULTRASOUND, ESOPHAGOSCOPY, GASTROSCOPY, DUODENOSCOPY (EGD), FINE NEEDLE ASPIRATE/BIOPSY;  Surgeon: Clemente Lugo MD;  Location:  GI     ESOPHAGOSCOPY, GASTROSCOPY, DUODENOSCOPY (EGD), COMBINED Left 2/25/2015    Procedure: COMBINED ESOPHAGOSCOPY, GASTROSCOPY, DUODENOSCOPY (EGD), BIOPSY  SINGLE OR MULTIPLE;  Surgeon: Clemente Lugo MD;  [facility]     ESOPHAGOSCOPY, GASTROSCOPY, DUODENOSCOPY (EGD), COMBINED N/A 9/25/2016    Procedure: COMBINED ESOPHAGOSCOPY, GASTROSCOPY, DUODENOSCOPY (EGD);  Surgeon: Aziza Patiño MD;  [facility]     ESOPHAGOSCOPY, GASTROSCOPY, DUODENOSCOPY (EGD), COMBINED N/A 1/18/2017    Procedure: COMBINED ESOPHAGOSCOPY, GASTROSCOPY, DUODENOSCOPY (EGD), BIOPSY SINGLE OR MULTIPLE;  Surgeon: Clemente Lopez MD;  [facility]     ESOPHAGOSCOPY, GASTROSCOPY, DUODENOSCOPY (EGD), COMBINED N/A 11/26/2017    Procedure: COMBINED ESOPHAGOSCOPY, GASTROSCOPY, DUODENOSCOPY (EGD), REMOVE FOREIGN BODY;  Esophagogastroduodenoscopy with foreign body extraction  ;  Surgeon: Herberth Castrejon MD;  [facility]     ESOPHAGOSCOPY, GASTROSCOPY, DUODENOSCOPY (EGD), COMBINED N/A 11/26/2017    Procedure: COMBINED ESOPHAGOSCOPY, GASTROSCOPY, DUODENOSCOPY (EGD), REMOVE FOREIGN BODY;;  Surgeon: Herberth Castrejon MD;  [facility]     FASCIOTOMY LOWER EXTREMITY Left 6/10/2016    Procedure: FASCIOTOMY LOWER EXTREMITY;  Surgeon: Mello Rodriguez MD;  [facility]     HC CAPSULE ENDOSCOPY N/A 8/25/2014    Procedure: CAPSULE/PILL CAM ENDOSCOPY;  Surgeon: Remy Haskins MD;  [facility]     HC CAPSULE ENDOSCOPY N/A 10/2/2014    Procedure: CAPSULE/PILL CAM ENDOSCOPY;  Surgeon: Remy Haskins MD;  [facility]     ORTHOPEDIC SURGERY      broken wrist repair     SEX TRANSFORMATION SURGERY, MALE TO FEMALE      1974     SINUS SURGERY      cyst removed     TONSILLECTOMY       VASCULAR SURGERY      Left carotid stent              Allergies:   Bee venom; Penicillins; Dilantin [phenytoin]; Iodide; Iodine-131; Novocaine [procaine]; and Tositumomab       Data:   All laboratory data reviewed:    LAST CHOLESTEROL:  Lab Results   Component Value Date    CHOL 155 06/06/2016     Lab Results   Component Value Date    HDL 65 06/06/2016     Lab Results   Component Value  Date    LDL 77 06/06/2016     Lab Results   Component Value Date    TRIG 62 06/06/2016     Lab Results   Component Value Date    CHOLHDLRATIO 2.7 09/15/2015       LAST BMP:  Lab Results   Component Value Date     05/03/2018      Lab Results   Component Value Date    POTASSIUM 3.8 05/03/2018     Lab Results   Component Value Date    CHLORIDE 110 05/03/2018     Lab Results   Component Value Date    CAROL 8.7 05/03/2018     Lab Results   Component Value Date    CO2 28 05/03/2018     Lab Results   Component Value Date    BUN 6 05/03/2018     Lab Results   Component Value Date    CR 0.48 05/03/2018     Lab Results   Component Value Date    GLC 84 05/03/2018       LAST CBC:  Lab Results   Component Value Date    WBC 9.0 05/02/2018     Lab Results   Component Value Date    RBC 3.83 05/02/2018     Lab Results   Component Value Date    HGB 11.4 05/02/2018     Lab Results   Component Value Date    HCT 33.5 05/02/2018     Lab Results   Component Value Date    MCV 88 05/02/2018     Lab Results   Component Value Date    MCH 29.8 05/02/2018     Lab Results   Component Value Date    MCHC 34.0 05/02/2018     Lab Results   Component Value Date    RDW 14.5 05/02/2018     Lab Results   Component Value Date     05/02/2018

## 2018-05-17 NOTE — LETTER
5/17/2018    Allan Casey MD  [address] [facility] [address]    RE: Sonya Foote       Dear Colleague,    I had the pleasure of seeing Sonya Foote in the [facility].    Cardiology Clinic Progress Note  Sonya Foote MRN# [id number]   YOB: 1950 Age: 68 year old     Reason For Visit:  Hospital discharge follow up   Primary Cardiologist:   Dr. Anderson          History of Presenting Illness:    Sonya Foote is a pleasant 68 year old patient with a history significant for CAD s/p stent RCA, MI, ejection fraction fell to 35%-40% (2016) from previous 60%, chronic dysphasia, esophageal strictures and previous dilatations, diabetes, bilateral above the knee amputations (2016), tobacco abuse, COPD, hypertension, carotid disease, and CVA.  On May 2, 2018 she presented to the emergency room with complaints of substernal left-sided chest pain with radiation to the left arm.  Her cardiac workup consisted of an angiogram that confirmed moderate in-stent re-narrowing and additional disease in the right coronary artery which appeared to be non-flow-limiting.  Her left coronary anatomy confirmed just mild disease.  Her follow-up echo confirmed hyperdynamic left ventricular function, visual ejection fraction estimated at greater than 70%, no regional wall motion abnormalities, no significant valvular disease.  She was discharged on an increased dose of lisinopril 20 mg daily and 50 mg Toprol daily for better blood pressure management. She comes to the office today for hospital discharge follow up.     She continues to complain of occasional chest pain and shortness of breath, improved with the help of nebulizers.  She denies PND, orthopnea, presyncope, syncope, heart racing, or palpitations.  She admits to spilling hot coffee on her right groin resulting in a superficial burn, which she continues to dressed with Silvadene.      Blood pressure today is on the soft side 100/63, with  "heart rate 75.  She states her home pressures are \"up and down\" .  Since the increase in both her lisinopril and Toprol,  she does report occasional episodes of lightheadedness.    Unfortunately she continues to smoke, although admits she has not smoked for the past 3 days and is currently wearing a nicotine patch.  She states she is still grieving the loss of her wife who passed away this past year.  She is planning to move to [place] in August where she will live with her sister.    She remains compliant with all medications and CPAP.                   Assessment and Plan:     (I25.118) Coronary artery disease of native artery of native heart with stable angina pectoris (H)  (primary encounter diagnosis)  Comment: Stable,  No chest pain at present. Angiogram confirms moderate in-stent re-narrowing and additional disease in the right coronary artery which appeared to be non-flow-limiting.  Her left coronary anatomy confirmed just mild disease.    Plan: Continue on aspirin, protonix, Metoprolol, lisinopril and statin.     (I25.5) Ischemic cardiomyopathy  Comment:  Recent echo confirmed hyperdynamic left ventricular function, visual ejection fraction estimated at greater than 70%, (prior LV evaluation 35-40%) no regional wall motion abnormalities, no significant valvular disease.   Plan: Continue on current medical therapy.     (I10) Essential hypertension  Comment: Borderline low today with occasional c/o lightheadedness. Monitor symptom and home blood pressures as she states her BP is \" up and down\".  Plan:  If BP remains on the lower side recommend decreasing her lisinopril to 10mg daily.       (E78.5) Hyperlipidemia LDL goal <70  Comment: Due for lipids, will get prior to appointment on 6/26/18  Plan: Continue on Lipitor. Heart healthy diet.     (Z72.0) Tobacco abuse  Comment: Encourage smoking cessation  Plan: Continue with nicotine patch.               Thank you for allowing me to participate in this delightful " "patient's care.      This note was completed in part using Dragon voice recognition software. Although reviewed after completion, some word and grammatical errors may occur.    VICTOR M Back, CNP          Review of Systems:   Review of Systems:  Skin:  Positive for bruising   Eyes:  Positive for glasses;glaucoma  ENT:  Negative    Respiratory:  Positive for dyspnea on exertion;cough  Cardiovascular:    Positive for;lightheadedness;palpitations  Gastroenterology: Positive for heartburn;reflux  Genitourinary:  not assessed    Musculoskeletal:  Positive for back pain;joint pain;arthritis  Neurologic:  Positive for headaches  Psychiatric:  Negative    Heme/Lymph/Imm:  Positive for allergies  Endocrine:  Positive for diabetes              Physical Exam:     Vitals: /63  Pulse 75  Ht 1.092 m (3' 7\")  Wt 62.6 kg (138 lb)  BMI 52.47 kg/m2  Constitutional:  cooperative, alert and oriented, well developed, well nourished, in no acute distress        Skin:  warm and dry to the touch   Healing burn to right groin. Right angiogram site stable     Head:  normocephalic, no masses or lesions        Eyes:  not assessed this visit        ENT:  no pallor or cyanosis poor dentition      Neck:  carotid pulses are full and equal bilaterally        Chest:  normal breath sounds, clear to auscultation, normal A-P diameter, normal symmetry, normal respiratory excursion, no use of accessory muscles        Cardiac: regular rhythm, normal S1/S2, no S3 or S4, apical impulse not displaced, no murmurs, gallops or rubs     no presence of murmur            Abdomen:  abdomen soft   Suprapubic catheter.     Vascular:     2+                           right femoral bruit (-)      Extremities and Back:      Bilateral above the knee amputation    Neurological:    dysphasia               Medications:     Current Outpatient Prescriptions   Medication Sig Dispense Refill     ACETAMINOPHEN PO Take 1,000 mg by mouth every 4 hours as needed " for pain Not to exceed 4000 mg/day       ADVAIR DISKUS 250-50 MCG/DOSE diskus inhaler Inhale 1 puff into the lungs 2 times daily 60 Inhaler 11     albuterol (2.5 MG/3ML) 0.083% neb solution INHALE 1 VIAL VIA NEBULIZER EVERY 6 HOURS AS NEEDED 360 mL 11     aspirin EC 81 MG EC tablet Take 1 tablet (81 mg) by mouth daily 90 tablet 3     atorvastatin (LIPITOR) 40 MG tablet Take 1 tablet (40 mg) by mouth daily 90 tablet 2     blood glucose monitoring (ONE TOUCH ULTRA 2) meter device kit Use to test blood sugars 3 times daily or as directed. 1 kit 0     blood glucose monitoring (ONE TOUCH ULTRA) test strip Use to test blood sugars 3 times daily or as directed. 3 Box 3     blood glucose monitoring (ONE TOUCH ULTRASOFT) lancets Use to test blood sugar 3 times daily or as directed. 100 each PRN     brimonidine (ALPHAGAN) 0.2 % ophthalmic solution Place 1 drop into the right eye 2 times daily 1 Bottle 11     calcium citrate-vitamin D (CITRACAL) 315-250 MG-UNIT TABS Take 2 tablets by mouth daily 120 tablet 5     cetirizine (ZYRTEC) 10 MG tablet Take 1 tablet (10 mg) by mouth daily as needed for allergies 90 tablet 3     Cholecalciferol (VITAMIN D) 2000 UNITS tablet Take 2,000 Units by mouth daily. 100 tablet 3     CYANOCOBALAMIN PO Take 2,000 mcg by mouth daily       diclofenac (VOLTAREN) 1 % GEL topical gel Apply 2 g topically 4 times daily to hands 100 g 11     dorzolamide (TRUSOPT) 2 % ophthalmic solution Place 1 drop into the right eye 2 times daily 1 Bottle 11     EPINEPHrine (EPIPEN/ADRENACLICK/OR ANY BX GENERIC EQUIV) 0.3 MG/0.3ML injection 2-pack Inject 0.3 mLs (0.3 mg) into the muscle as needed for anaphylaxis 0.6 mL 1     escitalopram (LEXAPRO) 10 MG tablet Take 10 mg by mouth daily        ESTRACE VAGINAL 0.1 MG/GM cream USE DIME SIZE AMOUNT 3X A WEEK AT NIGHT 42.5 g 11     estradiol (ESTRACE) 1 MG tablet Take 1 tablet (1 mg) by mouth daily 90 tablet 3     ferrous sulfate (IRON) 325 (65 FE) MG tablet Take 1 tablet  (325 mg) by mouth 2 times daily With meals 60 tablet 2     fluticasone (FLONASE) 50 MCG/ACT nasal spray Spray 1 spray into both nostrils daily       guaiFENesin-codeine (ROBITUSSIN AC) 100-10 MG/5ML SOLN solution Take 5 mLs by mouth every 4 hours as needed for cough 120 mL 0     hydrochlorothiazide (MICROZIDE) 12.5 MG capsule Take 1 capsule (12.5 mg) by mouth daily 90 capsule 2     lactulose (CHRONULAC) 10 GM/15ML solution Take 20 g by mouth as needed for constipation       latanoprost (XALATAN) 0.005 % ophthalmic solution Place 1 drop into both eyes At Bedtime 2.5 mL 11     levETIRAcetam (KEPPRA) 500 MG tablet Take 1 tablet (500 mg) by mouth 2 times daily 180 tablet 1     lidocaine (LIDODERM) 5 % Patch APPLY 1 TO 3 PATCHES TO PAINFUL AREA AT ONCE FOR UP TO 12 HOURS WITHIN A 24 HOUR PERIOD REMOVE AFTER 12 HOURS 30 patch 5     lisinopril (PRINIVIL/ZESTRIL) 20 MG tablet Take 1 tablet (20 mg) by mouth daily 90 tablet 0     MEDICATION GIVEN BY INTRATHECAL PUMP - INSTRUCTION continuous 2/8/18: Pump managed by [facility] in [place] [phone].   Conc: Bupivacaine 20 mg/mL and Fentanyl 2000 mcg/mL, morphine 2 mg/mL  Continuous:  Fentanyl 796 mcg/day, Bupivacaine 7.96 mg/day, Morphine 0.796 mg/day   Boluses: Up to 7 boluses per 24-hr period of Bupivicaine 0.5994 mg and Fentanyl 59.9 mcg morphine 0.0599 mg.  Pump Refill Date 02/28/18       metFORMIN (GLUCOPHAGE-XR) 500 MG 24 hr tablet Take 1 tablet (500 mg) by mouth daily (with dinner) 90 tablet 3     metoprolol succinate (TOPROL-XL) 50 MG 24 hr tablet Take 1 tablet (50 mg) by mouth daily 90 tablet 0     Multiple Vitamin (MULTIVITAMIN OR) Take 1 tablet by mouth daily        nicotine (NICODERM CQ) 14 MG/24HR 24 hr patch Place 1 patch onto the skin daily 30 patch 0     nitroglycerin (NITROSTAT) 0.4 MG SL tablet Place 1 tablet (0.4 mg) under the tongue every 5 minutes as needed for chest pain if you are still having symptoms after 3 doses (15  minutes) call 911. 25 tablet 1     ondansetron (ZOFRAN-ODT) 8 MG ODT tab Take 1 tablet (8 mg) by mouth every 8 hours as needed 30 tablet 5     ORDER FOR DME Equipment being ordered: Depends briefs 1 Month 11     ORDER FOR DME Equipment being ordered: Power Wheelchair 1 Device 0     order for DME Equipment being ordered: Glucerna daily shakes with each meal 1 Box 11     order for DME Equipment being ordered: Nebulizer and tubing supplies 1 Units 0     order for DME Equipment being ordered: CPAP supplies mask, hose, filters, cushion    fax to [facility] at [phone] 10 Device prn     order for DME Equipment being ordered: CPAP supplies mask, hose, filters, cushion fax to [facility] at [phone]  Disp: 10 Device Refills: prn   Class: Local Print Start: 2/10/2017 1 Device 0     order for DME Hospital bed with side rails 1 Device 0     order for DME Full electric hospital bed with half rails    Dx: T29865, I110, J449  Length of need: lifetime 1 Device 0     order for DME Wheel Chair Cushion: 18 x 18 inch Roho cushion 1 Device 0     order for DME Equipment being ordered: bandage tape, prefer paper 1 Package 0     pantoprazole (PROTONIX) 40 MG EC tablet Take 1 tablet (40 mg) by mouth daily 30 tablet 5     phenytoin 200 MG CAPS Take 200 mg by mouth 2 times daily (Patient taking differently: Take 200 mg by mouth 2 times daily (takes at 8 AM and 8 PM)) 60 capsule 0     polyethylene glycol (MIRALAX/GLYCOLAX) Packet Take 17 g by mouth daily Dissolved in water or juice        pregabalin (LYRICA) 75 MG capsule Take 2 capsules (150 mg) by mouth 2 times daily 120 capsule 5     PROCHLORPERAZINE MALEATE PO Take 10 mg by mouth every 8 hours as needed for nausea or vomiting       risperiDONE (RISPERDAL) 0.5 MG tablet Take 0.5 mg by mouth At Bedtime       silver sulfADIAZINE (SILVADENE) 1 % cream Apply topically 2 times daily 50 g 0     sucralfate (CARAFATE) 1 GM tablet Take 1 tablet (1 g) by mouth 4 times daily May  dissolve in 10 mL water is necessary. (Start upon completion of carafate suspension) 120 tablet 11     traZODone (DESYREL) 50 MG tablet Take 150 mg by mouth At Bedtime        umeclidinium (INCRUSE ELLIPTA) 62.5 MCG/INH oral inhaler Inhale 1 puff into the lungs daily       VENTOLIN  (90 BASE) MCG/ACT Inhaler Inhale 2 puffs into the lungs every 6 hours as needed for shortness of breath / dyspnea or wheezing 3 Inhaler 5     zinc 50 MG TABS Take 1 tablet by mouth daily             Family History   Problem Relation Age of Onset     Dementia Mother      Glaucoma Mother      DIABETES Mother      may have been type 1, childhood     Coronary Artery Disease Mother      MI     Glaucoma Father      DIABETES Father      may hev been type 1 - ??     Heart Failure Father      CANCER Maternal Aunt      leukemia     Schizophrenia Brother      Depression Brother      Suicide Sister      Depression Sister      DIABETES Sister      CANCER Maternal Aunt      ovarian     Glaucoma Maternal Grandmother      DIABETES Maternal Grandmother      Glaucoma Maternal Grandfather      DIABETES Maternal Grandfather      Glaucoma Paternal Grandmother      DIABETES Paternal Grandmother      Glaucoma Paternal Grandfather      DIABETES Paternal Grandfather      Breast Cancer Sister      CEREBROVASCULAR DISEASE Brother      Colon Cancer No family hx of      Macular Degeneration No family hx of        Social History     Social History     Marital status:      Spouse name: N/A     Number of children: 2     Years of education: N/A     Occupational History     retired      retired     Social History Main Topics     Smoking status: Former Smoker     Packs/day: 2.50     Years: 30.00     Types: Cigarettes, Cigars     Quit date: 11/1/2001     Smokeless tobacco: Never Used     Alcohol use No     Drug use: No     Sexual activity: Not Currently     Other Topics Concern     Caffeine Concern No     1 in the morning     Social History Narrative      Her  father was in the  and she moved around a lot when she was a kid, and has lived abroad including in [place] and the [facility].  She was previously employed as a mental health worker. Moved from [place] to [place] in 2012. She is currently living in assisted living ([facility] in [facility]).  Wife passed away 6/2017.            She has 2 adopted children (Kam and Prashanth)            Past Medical History:     Past Medical History:   Diagnosis Date     Anemia      Antiplatelet or antithrombotic long-term use      Arthritis      AS (sickle cell trait) (H) 10/8/2013     Blind left eye      BPPV (benign paroxysmal positional vertigo)      Chronic back pain      COPD (chronic obstructive pulmonary disease) (H)      Coronary artery disease      CVA (cerebral infarction) 10/8/2012    x3, residual left sided weakness     Diastolic CHF (H) 9/4/2012     Dilantin toxicity 5/31/2013     Esophageal candidiasis (H)      GERD (gastroesophageal reflux disease)      Heart attack      Hernia, abdominal      History of blood transfusion     x5     History of thrombophlebitis      HTN (hypertension) 9/4/2012     Hyperlipidemia LDL goal <100 9/4/2012     Legally blind in right eye, as defined in [place]     No periphal vision right eye     Osteoporosis 1/21/2013     Other chronic pain      PAD (peripheral artery disease) (H)      Palpitations      Person who has had sex change operation 1/20/2014     Pneumonia      Schizoaffective disorder (H)      Seizure disorder (H) 9/4/2012     Sleep apnea     CPAP     Thrombosis of leg      Type 2 diabetes, HbA1C goal < 8% (H) 9/4/2012     Uncomplicated asthma      Unspecified cerebral artery occlusion with cerebral infarction               Past Surgical History:     Past Surgical History:   Procedure Laterality Date     AMPUTATE LEG ABOVE KNEE Left 6/11/2016    Procedure: AMPUTATE LEG ABOVE KNEE;  Surgeon: Mello Rodriguez MD;  [facility]     AMPUTATE LEG BELOW KNEE Right  11/7/2016    Procedure: AMPUTATE LEG BELOW KNEE;  Surgeon: Savannah Durant MD;  [facility]     AMPUTATE REVISION STUMP LOWER EXTREMITY Right 11/11/2016    Procedure: AMPUTATE REVISION STUMP LOWER EXTREMITY;  Surgeon: Savannah Durant MD;  [facility]     AMPUTATE REVISION STUMP LOWER EXTREMITY Right 11/16/2016    Procedure: AMPUTATE REVISION STUMP LOWER EXTREMITY;  Surgeon: Savannah Durant MD;  [facility]     AMPUTATE TOE(S) Right 1/5/2016    Procedure: AMPUTATE TOE(S);  Surgeon: Mello Gaines DPM;  Location: [facility]     ANGIOGRAM Bilateral 11/21/2014    Procedure: ANGIOGRAM;  Surgeon: Savannah Durant MD;  [facility]     ANGIOGRAM Left 1/16/2015    Procedure: ANGIOGRAM;  Surgeon: Savannah Durant MD;  [facility]     ANGIOGRAM Bilateral 9/14/2015    Procedure: ANGIOGRAM;  Surgeon: Savannah Durant MD;  [facility]     ANGIOGRAM Left 10/12/2015    Procedure: ANGIOGRAM;  Surgeon: Savannah Durant MD;  [facility]     ANGIOGRAM Right 6/6/2016    Procedure: ANGIOGRAM;  Surgeon: Savannah Durant MD;  [facility]     ANGIOPLASTY Right 6/6/2016    Procedure: ANGIOPLASTY;  Surgeon: Savannah Durant MD;  [facility]     APPENDECTOMY       BREAST SURGERY      right breast bx (benign)     CATARACT IOL, RT/LT Right      CHOLECYSTECTOMY       COLONOSCOPY N/A 8/25/2014    Procedure: COLONOSCOPY;  Surgeon: Mello Ferrer MD;  [facility]     COLONOSCOPY WITH CO2 INSUFFLATION N/A 8/20/2014    Procedure: COLONOSCOPY WITH CO2 INSUFFLATION;  Surgeon: Duane, William Charles, MD;  Location: [facility]     CYSTOSTOMY, INSERT TUBE SUPRAPUBIC, COMBINED N/A 1/16/2018    Procedure: COMBINED CYSTOSTOMY, INSERT TUBE SUPRAPUBIC;  Cystoscopy, Intraoperative Ultrasound, Suprapubic Tube Placement;  Surgeon: Keanu Dawson MD;  [facility]     ENDARTERECTOMY FEMORAL  5/23/2014    Procedure: ENDARTERECTOMY FEMORAL;  Surgeon: Jason Joshi MD;  [facility]     ESOPHAGOSCOPY, GASTROSCOPY, DUODENOSCOPY  (EGD), COMBINED  12/14/2012    Procedure: COMBINED ESOPHAGOSCOPY, GASTROSCOPY, DUODENOSCOPY (EGD), BIOPSY SINGLE OR MULTIPLE;  ESOPHAGOSCOPY, GASTROSCOPY, DUODENOSCOPY (EGD), DILATATION ;  Surgeon: Elizabeth Stevenson MD;  Location:  GI     ESOPHAGOSCOPY, GASTROSCOPY, DUODENOSCOPY (EGD), COMBINED  12/31/2013    Procedure: COMBINED ESOPHAGOSCOPY, GASTROSCOPY, DUODENOSCOPY (EGD), BIOPSY SINGLE OR MULTIPLE;;  Surgeon: Clemente Lopez MD;  Location:  GI     ESOPHAGOSCOPY, GASTROSCOPY, DUODENOSCOPY (EGD), COMBINED  4/1/2014    Procedure: COMBINED ESOPHAGOSCOPY, GASTROSCOPY, DUODENOSCOPY (EGD);;  Surgeon: Clemente Lopez MD;  Location:  GI     ESOPHAGOSCOPY, GASTROSCOPY, DUODENOSCOPY (EGD), COMBINED  6/28/2014    Procedure: COMBINED ESOPHAGOSCOPY, GASTROSCOPY, DUODENOSCOPY (EGD);  Surgeon: Clemente Lopez MD;  Location:  GI     ESOPHAGOSCOPY, GASTROSCOPY, DUODENOSCOPY (EGD), COMBINED N/A 8/20/2014    Procedure: COMBINED ESOPHAGOSCOPY, GASTROSCOPY, DUODENOSCOPY (EGD), BIOPSY SINGLE OR MULTIPLE;  Surgeon: Duane, William Charles, MD;  Location: [facility]     ESOPHAGOSCOPY, GASTROSCOPY, DUODENOSCOPY (EGD), COMBINED N/A 8/22/2014    Procedure: COMBINED ESOPHAGOSCOPY, GASTROSCOPY, DUODENOSCOPY (EGD), BIOPSY SINGLE OR MULTIPLE;  Surgeon: Mello Ferrer MD;  Location:  GI     ESOPHAGOSCOPY, GASTROSCOPY, DUODENOSCOPY (EGD), COMBINED N/A 10/2/2014    Procedure: COMBINED ESOPHAGOSCOPY, GASTROSCOPY, DUODENOSCOPY (EGD), BIOPSY SINGLE OR MULTIPLE;  Surgeon: Remy Haskins MD;  Location:  GI     ESOPHAGOSCOPY, GASTROSCOPY, DUODENOSCOPY (EGD), COMBINED Left 12/15/2014    Procedure: COMBINED ESOPHAGOSCOPY, GASTROSCOPY, DUODENOSCOPY (EGD), BIOPSY SINGLE OR MULTIPLE;  Surgeon: Remy Haskins MD;  [facility]     ESOPHAGOSCOPY, GASTROSCOPY, DUODENOSCOPY (EGD), COMBINED N/A 2/25/2015    Procedure: COMBINED ENDOSCOPIC ULTRASOUND, ESOPHAGOSCOPY, GASTROSCOPY, DUODENOSCOPY (EGD), FINE NEEDLE ASPIRATE/BIOPSY;  Surgeon:  Clemente Lugo MD;  [facility]     ESOPHAGOSCOPY, GASTROSCOPY, DUODENOSCOPY (EGD), COMBINED Left 2/25/2015    Procedure: COMBINED ESOPHAGOSCOPY, GASTROSCOPY, DUODENOSCOPY (EGD), BIOPSY SINGLE OR MULTIPLE;  Surgeon: Clemente Lugo MD;  [facility]     ESOPHAGOSCOPY, GASTROSCOPY, DUODENOSCOPY (EGD), COMBINED N/A 9/25/2016    Procedure: COMBINED ESOPHAGOSCOPY, GASTROSCOPY, DUODENOSCOPY (EGD);  Surgeon: Aziza Patiño MD;  [facility]     ESOPHAGOSCOPY, GASTROSCOPY, DUODENOSCOPY (EGD), COMBINED N/A 1/18/2017    Procedure: COMBINED ESOPHAGOSCOPY, GASTROSCOPY, DUODENOSCOPY (EGD), BIOPSY SINGLE OR MULTIPLE;  Surgeon: Clemente Lopez MD;  [facility]     ESOPHAGOSCOPY, GASTROSCOPY, DUODENOSCOPY (EGD), COMBINED N/A 11/26/2017    Procedure: COMBINED ESOPHAGOSCOPY, GASTROSCOPY, DUODENOSCOPY (EGD), REMOVE FOREIGN BODY;  Esophagogastroduodenoscopy with foreign body extraction  ;  Surgeon: Herberth Castrejon MD;  [facility]     ESOPHAGOSCOPY, GASTROSCOPY, DUODENOSCOPY (EGD), COMBINED N/A 11/26/2017    Procedure: COMBINED ESOPHAGOSCOPY, GASTROSCOPY, DUODENOSCOPY (EGD), REMOVE FOREIGN BODY;;  Surgeon: Herberth Castrejon MD;  [facility]     FASCIOTOMY LOWER EXTREMITY Left 6/10/2016    Procedure: FASCIOTOMY LOWER EXTREMITY;  Surgeon: Mello Rodriguez MD;  [facility]     HC CAPSULE ENDOSCOPY N/A 8/25/2014    Procedure: CAPSULE/PILL CAM ENDOSCOPY;  Surgeon: Remy Haskins MD;  [facility]     HC CAPSULE ENDOSCOPY N/A 10/2/2014    Procedure: CAPSULE/PILL CAM ENDOSCOPY;  Surgeon: Remy Haskins MD;  [facility]     ORTHOPEDIC SURGERY      broken wrist repair     SEX TRANSFORMATION SURGERY, MALE TO FEMALE      1974     SINUS SURGERY      cyst removed     TONSILLECTOMY       VASCULAR SURGERY      Left carotid stent              Allergies:   Bee venom; Penicillins; Dilantin [phenytoin]; Iodide; Iodine-131; Novocaine [procaine]; and Tositumomab       Data:   All  laboratory data reviewed:    LAST CHOLESTEROL:  Lab Results   Component Value Date    CHOL 155 06/06/2016     Lab Results   Component Value Date    HDL 65 06/06/2016     Lab Results   Component Value Date    LDL 77 06/06/2016     Lab Results   Component Value Date    TRIG 62 06/06/2016     Lab Results   Component Value Date    CHOLHDLRATIO 2.7 09/15/2015       LAST BMP:  Lab Results   Component Value Date     05/03/2018      Lab Results   Component Value Date    POTASSIUM 3.8 05/03/2018     Lab Results   Component Value Date    CHLORIDE 110 05/03/2018     Lab Results   Component Value Date    CAROL 8.7 05/03/2018     Lab Results   Component Value Date    CO2 28 05/03/2018     Lab Results   Component Value Date    BUN 6 05/03/2018     Lab Results   Component Value Date    CR 0.48 05/03/2018     Lab Results   Component Value Date    GLC 84 05/03/2018       LAST CBC:  Lab Results   Component Value Date    WBC 9.0 05/02/2018     Lab Results   Component Value Date    RBC 3.83 05/02/2018     Lab Results   Component Value Date    HGB 11.4 05/02/2018     Lab Results   Component Value Date    HCT 33.5 05/02/2018     Lab Results   Component Value Date    MCV 88 05/02/2018     Lab Results   Component Value Date    MCH 29.8 05/02/2018     Lab Results   Component Value Date    MCHC 34.0 05/02/2018     Lab Results   Component Value Date    RDW 14.5 05/02/2018     Lab Results   Component Value Date     05/02/2018       Thank you for allowing me to participate in the care of your patient.    Sincerely,     VICTOR M Back [facility]

## 2018-05-17 NOTE — MR AVS SNAPSHOT
After Visit Summary   5/17/2018    Sonya Foote    MRN: [id number]           Patient Information     Date Of Birth          1950        Visit Information        Provider Department      5/17/2018 1:30 PM Inge Kerns APRN CNP [facility]        Care Instructions    Thanks for coming into [facility] today.    Reviewed results of angiogram and echo results.   Strength of your heart has improved to normal.   Continue with current medical therapy.  Recommend monitoring blood pressures at home   Follow up with primary cardiologist in 2 weeks.                 Please call my nurse at [phone] with any questions or concerns.    Scheduling phone number: [phone]  Reminder: Please bring in all current medications, over the counter supplements and vitamin bottles to your next appointment.                Follow-ups after your visit        Your next 10 appointments already scheduled     May 29, 2018 11:00 AM CDT   L/Vision Treatment with Manuela Mitchell OT   [facility] ([facility])    [address] [facility] [address] [phone]            Jun 04, 2018  9:30 AM CDT   (Arrive by 9:15 AM)   Return Visit with Alina Jennings MD   [facility] ([facility])    [address]  [address]  [facility] [facility] [address] [phone]            Jun 12, 2018  9:30 AM CDT   Return Sleep Patient with Guanaco Kowalski MD   [facility] ([facility])    [address]  [address]  [facility] [address]   [phone]            Jun 26, 2018 10:45 AM CDT   Return Visit with Bj Anderson MD   [facility]   [place] ([facility])    [address]  [facility] [address] [phone] OPT 2            Jul 26,  " 10:15 AM CDT   RETURN GLAUCOMA with Marely Robin MD   [facility] ([facility])    [address]  [address] Clin [address]  [facility] [id number] [phone]              Who to contact     If you have questions or need follow up information about today's clinic visit or your schedule please contact [facility] directly at [phone].  Normal or non-critical lab and imaging results will be communicated to you by Macoscopehart, letter or phone within 4 business days after the clinic has received the results. If you do not hear from us within 7 days, please contact the clinic through Macoscopehart or phone. If you have a critical or abnormal lab result, we will notify you by phone as soon as possible.  Submit refill requests through Quippi or call your pharmacy and they will forward the refill request to us. Please allow 3 business days for your refill to be completed.          Additional Information About Your Visit        Quippi Information     Quippi lets you send messages to your doctor, view your test results, renew your prescriptions, schedule appointments and more. To sign up, go to [email or web address] . Click on \"Log in\" on the left side of the screen, which will take you to the Welcome page. Then click on \"Sign up Now\" on the right side of the page.     You will be asked to enter the access code listed below, as well as some personal information. Please follow the directions to create your username and password.     Your access code is: [email or web address]  Expires: 2018 11:08 AM     Your access code will  in 90 days. If you need help or a new code, please call your [facility] clinic or [phone].        Care EveryWhere ID     This is your Care EveryWhere ID. This could be used by other organizations to access your [facility] medical records  [id number]        Your Vitals Were     Pulse Height BMI (Body Mass Index)       " "      75 1.092 m (3' 7\") 52.47 kg/m2          Blood Pressure from Last 3 Encounters:   05/17/18 100/63   05/09/18 142/72   05/04/18 135/80    Weight from Last 3 Encounters:   05/17/18 62.6 kg (138 lb)   05/09/18 62.6 kg (138 lb)   05/02/18 59.6 kg (131 lb 6.4 oz)              Today, you had the following     No orders found for display         Today's Medication Changes          These changes are accurate as of 5/17/18  2:18 PM.  If you have any questions, ask your nurse or doctor.               These medicines have changed or have updated prescriptions.        Dose/Directions    Phenytoin Sodium Extended 200 MG Caps   This may have changed:  additional instructions   Used for:  Seizure disorder (H)        Dose:  200 mg   Take 200 mg by mouth 2 times daily   Quantity:  60 capsule   Refills:  0                Primary Care Provider Office Phone # Fax #    Allan Casey [phone]       [address] [place] [phone]        Equal Access to Services     [facility]: Hadii shaheen gastelum hadasho Sojoe, waaxda luqadaha, qaybta kaalmada adeegan, tonie marvin . So [facility] [phone].    ATENCIÓN: Si habla español, tiene a briones disposición servicios gratuitos de asistencia lingüística. Llame al [phone].    We comply with applicable federal civil rights laws and [place] laws. We do not discriminate on the basis of race, color, national origin, age, disability, sex, sexual orientation, or gender identity.            Thank you!     Thank you for choosing [facility]  for your care. Our goal is always to provide you with excellent care. Hearing back from our patients is one way we can continue to improve our services. Please take a few minutes to complete the written survey that you may receive in the mail after your visit with us. Thank you!             Your Updated Medication List - Protect others around you: Learn how to safely " use, store and throw away your medicines at www.disposemymeds.org.          This list is accurate as of 5/17/18  2:18 PM.  Always use your most recent med list.                   Brand Name Dispense Instructions for use Diagnosis    ACETAMINOPHEN PO      Take 1,000 mg by mouth every 4 hours as needed for pain Not to exceed 4000 mg/day        ADVAIR DISKUS 250-50 MCG/DOSE diskus inhaler   Generic drug:  fluticasone-salmeterol     60 Inhaler    Inhale 1 puff into the lungs 2 times daily    Acute bronchitis       * albuterol (2.5 MG/3ML) 0.083% neb solution     360 mL    INHALE 1 VIAL VIA NEBULIZER EVERY 6 HOURS AS NEEDED    Chronic obstructive pulmonary disease, unspecified COPD type (H)       * VENTOLIN  (90 Base) MCG/ACT Inhaler   Generic drug:  albuterol     3 Inhaler    Inhale 2 puffs into the lungs every 6 hours as needed for shortness of breath / dyspnea or wheezing    Chronic obstructive pulmonary disease, unspecified COPD type (H)       aspirin 81 MG EC tablet     90 tablet    Take 1 tablet (81 mg) by mouth daily    Unstable angina (H)       atorvastatin 40 MG tablet    LIPITOR    90 tablet    Take 1 tablet (40 mg) by mouth daily    Hyperlipidemia LDL goal <100       blood glucose monitoring lancets     100 each    Use to test blood sugar 3 times daily or as directed.    Type 2 diabetes mellitus with diabetic peripheral angiopathy without gangrene, without long-term current use of insulin (H)       blood glucose monitoring meter device kit     1 kit    Use to test blood sugars 3 times daily or as directed.    Type 2 diabetes, HbA1C goal < 8% (H)       blood glucose monitoring test strip    ONETOUCH ULTRA    3 Box    Use to test blood sugars 3 times daily or as directed.    Type 2 diabetes mellitus with diabetic peripheral angiopathy without gangrene, without long-term current use of insulin (H)       brimonidine 0.2 % ophthalmic solution    ALPHAGAN    1 Bottle    Place 1 drop into the right eye 2 times  daily    Primary open angle glaucoma of both eyes, severe stage       calcium citrate-vitamin D 315-250 MG-UNIT Tabs per tablet    CITRACAL    120 tablet    Take 2 tablets by mouth daily    Osteoporosis       cetirizine 10 MG tablet    zyrTEC    90 tablet    Take 1 tablet (10 mg) by mouth daily as needed for allergies    Seasonal allergic rhinitis, unspecified allergic rhinitis trigger       CYANOCOBALAMIN PO      Take 2,000 mcg by mouth daily        diclofenac 1 % Gel topical gel    VOLTAREN    100 g    Apply 2 g topically 4 times daily to hands    Primary osteoarthritis of both hands       dorzolamide 2 % ophthalmic solution    TRUSOPT    1 Bottle    Place 1 drop into the right eye 2 times daily    Primary open angle glaucoma of both eyes, severe stage       EPINEPHrine 0.3 MG/0.3ML injection 2-pack    EPIPEN/ADRENACLICK/or ANY BX GENERIC EQUIV    0.6 mL    Inject 0.3 mLs (0.3 mg) into the muscle as needed for anaphylaxis    Bee sting reaction, undetermined intent, subsequent encounter       escitalopram 10 MG tablet    LEXAPRO     Take 10 mg by mouth daily        ESTRACE VAGINAL 0.1 MG/GM cream   Generic drug:  estradiol     42.5 g    USE DIME SIZE AMOUNT 3X A WEEK AT NIGHT    Atrophic vaginitis       estradiol 1 MG tablet    ESTRACE    90 tablet    Take 1 tablet (1 mg) by mouth daily    Person who has had sex change operation       ferrous sulfate 325 (65 Fe) MG tablet    IRON    60 tablet    Take 1 tablet (325 mg) by mouth 2 times daily With meals        fluticasone 50 MCG/ACT spray    FLONASE     Spray 1 spray into both nostrils daily        guaiFENesin-codeine 100-10 MG/5ML Soln solution    ROBITUSSIN AC    120 mL    Take 5 mLs by mouth every 4 hours as needed for cough    Upper respiratory tract infection, unspecified type       hydrochlorothiazide 12.5 MG capsule    MICROZIDE    90 capsule    Take 1 capsule (12.5 mg) by mouth daily    Essential hypertension with goal blood pressure less than 130/80        INCRUSE ELLIPTA 62.5 MCG/INH oral inhaler   Generic drug:  umeclidinium      Inhale 1 puff into the lungs daily        lactulose 10 GM/15ML solution    CHRONULAC     Take 20 g by mouth as needed for constipation        latanoprost 0.005 % ophthalmic solution    XALATAN    2.5 mL    Place 1 drop into both eyes At Bedtime    Primary open angle glaucoma of both eyes, severe stage       levETIRAcetam 500 MG tablet    KEPPRA    180 tablet    Take 1 tablet (500 mg) by mouth 2 times daily    Nausea       lidocaine 5 % Patch    LIDODERM    30 patch    APPLY 1 TO 3 PATCHES TO PAINFUL AREA AT ONCE FOR UP TO 12 HOURS WITHIN A 24 HOUR PERIOD REMOVE AFTER 12 HOURS    Chronic pain syndrome, 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 (H)       lisinopril 20 MG tablet    PRINIVIL/ZESTRIL    90 tablet    Take 1 tablet (20 mg) by mouth daily    History of coronary artery disease       MEDICATION GIVEN BY INTRATHECAL PUMP - INSTRUCTION      continuous 2/8/18: Pump managed by [facility] in [place] [phone].  Conc: Bupivacaine 20 mg/mL and Fentanyl 2000 mcg/mL, morphine 2 mg/mL Continuous:  Fentanyl 796 mcg/day, Bupivacaine 7.96 mg/day, Morphine 0.796 mg/day  Boluses: Up to 7 boluses per 24-hr period of Bupivicaine 0.5994 mg and Fentanyl 59.9 mcg morphine 0.0599 mg. Pump Refill Date 02/28/18        metFORMIN 500 MG 24 hr tablet    GLUCOPHAGE-XR    90 tablet    Take 1 tablet (500 mg) by mouth daily (with dinner)    Type 2 diabetes mellitus with diabetic peripheral angiopathy and gangrene, without long-term current use of insulin (H)       metoprolol succinate 50 MG 24 hr tablet    TOPROL-XL    90 tablet    Take 1 tablet (50 mg) by mouth daily    History of coronary artery disease       MULTIVITAMIN PO      Take 1 tablet by mouth daily        nicotine 14 MG/24HR 24 hr patch    NICODERM CQ    30 patch    Place 1 patch onto the skin daily    Tobacco dependence syndrome       nitroGLYcerin 0.4  MG sublingual tablet    NITROSTAT    25 tablet    Place 1 tablet (0.4 mg) under the tongue every 5 minutes as needed for chest pain if you are still having symptoms after 3 doses (15 minutes) call 911.    Chronic systolic congestive heart failure (H), Old myocardial infarction       ondansetron 8 MG ODT tab    ZOFRAN-ODT    30 tablet    Take 1 tablet (8 mg) by mouth every 8 hours as needed    Other migraine without status migrainosus, not intractable       order for DME     1 Month    Equipment being ordered: Depends briefs    Incontinence       order for DME     1 Device    Equipment being ordered: Power Wheelchair    CVA (cerebral infarction), HTN (hypertension)       order for DME     1 Box    Equipment being ordered: Glucerna daily shakes with each meal    Type 2 diabetes mellitus with other diabetic neurological complication       * order for DME     1 Units    Equipment being ordered: Nebulizer and tubing supplies    Simple chronic bronchitis (H)       * order for DME     1 Device    Equipment being ordered: CPAP supplies mask, hose, filters, cushion fax to [facility] at [phone] Disp: 10 DeviceRefills: prn Class: Local PrintStart: 2/10/2017    Chronic obstructive pulmonary disease, unspecified COPD type (H)       * order for DME     10 Device    Equipment being ordered: CPAP supplies mask, hose, filters, cushion  fax to [facility] at [phone]    COPD (chronic obstructive pulmonary disease) (H)       * order for DME     1 Device    Hospital bed with side rails    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 (H)       * order for DME     1 Device    Full electric hospital bed with half rails  Dx: P75999, I110, J449 Length of need: lifetime    Status post bilateral above knee amputation (H)       * order for DME     1 Device    Wheel Chair Cushion: 18 x 18 inch Roho cushion    Status post bilateral above knee amputation (H)       order for DME     1 Package    Equipment being  ordered: bandage tape, prefer paper    Burn of skin       pantoprazole 40 MG EC tablet    PROTONIX    30 tablet    Take 1 tablet (40 mg) by mouth daily    Gastroesophageal reflux disease without esophagitis       Phenytoin Sodium Extended 200 MG Caps     60 capsule    Take 200 mg by mouth 2 times daily    Seizure disorder (H)       polyethylene glycol Packet    MIRALAX/GLYCOLAX     Take 17 g by mouth daily Dissolved in water or juice        pregabalin 75 MG capsule    LYRICA    120 capsule    Take 2 capsules (150 mg) by mouth 2 times daily    Pain in both upper extremities       PROCHLORPERAZINE MALEATE PO      Take 10 mg by mouth every 8 hours as needed for nausea or vomiting        risperiDONE 0.5 MG tablet    risperDAL     Take 0.5 mg by mouth At Bedtime        silver sulfADIAZINE 1 % cream    SILVADENE    50 g    Apply topically 2 times daily    Burn       sucralfate 1 GM tablet    CARAFATE    120 tablet    Take 1 tablet (1 g) by mouth 4 times daily May dissolve in 10 mL water is necessary. (Start upon completion of carafate suspension)    Adjustment disorder with depressed mood       traZODone 50 MG tablet    DESYREL     Take 150 mg by mouth At Bedtime        vitamin D 2000 units tablet     100 tablet    Take 2,000 Units by mouth daily.        zinc 50 MG Tabs      Take 1 tablet by mouth daily        * Notice:  This list has 8 medication(s) that are the same as other medications prescribed for you. Read the directions carefully, and ask your doctor or other care provider to review them with you.

## 2018-05-24 ENCOUNTER — TELEPHONE (OUTPATIENT)
Dept: INTERNAL MEDICINE | Facility: CLINIC | Age: 68
End: 2018-05-24

## 2018-05-24 ENCOUNTER — PATIENT OUTREACH (OUTPATIENT)
Dept: GERIATRIC MEDICINE | Facility: CLINIC | Age: 68
End: 2018-05-24

## 2018-05-24 ENCOUNTER — OFFICE VISIT (OUTPATIENT)
Dept: INTERNAL MEDICINE | Facility: CLINIC | Age: 68
End: 2018-05-24
Payer: COMMERCIAL

## 2018-05-24 VITALS
OXYGEN SATURATION: 97 % | HEART RATE: 73 BPM | DIASTOLIC BLOOD PRESSURE: 80 MMHG | RESPIRATION RATE: 8 BRPM | TEMPERATURE: 98.3 F | SYSTOLIC BLOOD PRESSURE: 134 MMHG

## 2018-05-24 DIAGNOSIS — R30.0 DYSURIA: ICD-10-CM

## 2018-05-24 LAB
ALBUMIN UR-MCNC: NEGATIVE MG/DL
APPEARANCE UR: CLEAR
BACTERIA #/AREA URNS HPF: ABNORMAL /HPF
BILIRUB UR QL STRIP: NEGATIVE
COLOR UR AUTO: YELLOW
GLUCOSE UR STRIP-MCNC: NEGATIVE MG/DL
HGB UR QL STRIP: ABNORMAL
KETONES UR STRIP-MCNC: NEGATIVE MG/DL
LEUKOCYTE ESTERASE UR QL STRIP: ABNORMAL
NITRATE UR QL: NEGATIVE
PH UR STRIP: 7 PH (ref 5–7)
RBC #/AREA URNS AUTO: ABNORMAL /HPF
SOURCE: ABNORMAL
SP GR UR STRIP: <=1.005 (ref 1–1.03)
UROBILINOGEN UR STRIP-ACNC: 0.2 EU/DL (ref 0.2–1)
WBC #/AREA URNS AUTO: ABNORMAL /HPF

## 2018-05-24 PROCEDURE — 99213 OFFICE O/P EST LOW 20 MIN: CPT | Performed by: PHYSICIAN ASSISTANT

## 2018-05-24 PROCEDURE — 87086 URINE CULTURE/COLONY COUNT: CPT | Performed by: PHYSICIAN ASSISTANT

## 2018-05-24 PROCEDURE — 81001 URINALYSIS AUTO W/SCOPE: CPT | Performed by: PHYSICIAN ASSISTANT

## 2018-05-24 RX ORDER — TETRACYCLINE HYDROCHLORIDE 500 MG/1
500 CAPSULE ORAL 2 TIMES DAILY
Qty: 28 CAPSULE | Refills: 0 | Status: SHIPPED | OUTPATIENT
Start: 2018-05-24 | End: 2018-07-12

## 2018-05-24 ASSESSMENT — PAIN SCALES - GENERAL: PAINLEVEL: EXTREME PAIN (8)

## 2018-05-24 NOTE — PROGRESS NOTES
SUBJECTIVE:   Sonya Foote is a 68 year old female who presents to clinic today for the following health issues:      URINARY TRACT SYMPTOMS      Duration: 4 days    Description  dysuria, frequency, urgency and back pain  Back pain is middle of back and lower back left side     Intensity:  moderate    Accompanying signs and symptoms:  Fever/chills: no   Flank pain YES- Some  Nausea and vomiting: no   Vaginal symptoms: none  Abdominal/Pelvic Pain: no- Abdominal pain-Pt has a tube in her abdominal    History  History of frequent UTI's: YES  History of kidney stones: no   Sexually Active: no   Possibility of pregnancy: No    Precipitating or alleviating factors: None    Therapies tried and outcome: cranberry juice    Outcome: Does not work    Problem list and histories reviewed & adjusted, as indicated.  Additional history: as documented    Reviewed and updated as needed this visit by clinical staff  Tobacco  Allergies  Meds  Problems       Reviewed and updated as needed this visit by Provider  Meds  Problems           OBJECTIVE:     /80  Pulse 73  Temp 98.3  F (36.8  C) (Oral)  Resp 8  SpO2 97%  Breastfeeding? No  There is no height or weight on file to calculate BMI.  GENERAL: healthy, alert and no distress  RESP: lungs clear to auscultation - no rales, rhonchi or wheezes  CV: regular rates and rhythm, normal S1 S2, no S3 or S4 and no murmur, click or rub  ABDOMEN: soft,non-specific abdominal tenderness, no hepatosplenomegaly, no masses and bowel sounds normal, right sided back pain from lower to upper back thus CVA +    Diagnostic Test Results:  Results for orders placed or performed in visit on 05/24/18 (from the past 24 hour(s))   UA with Microscopic reflex to Culture   Result Value Ref Range    Color Urine Yellow     Appearance Urine Clear     Glucose Urine Negative NEG^Negative mg/dL    Bilirubin Urine Negative NEG^Negative    Ketones Urine Negative NEG^Negative mg/dL    Specific Gravity Urine  <=1.005 1.003 - 1.035    pH Urine 7.0 5.0 - 7.0 pH    Protein Albumin Urine Negative NEG^Negative mg/dL    Urobilinogen Urine 0.2 0.2 - 1.0 EU/dL    Nitrite Urine Negative NEG^Negative    Blood Urine Large (A) NEG^Negative    Leukocyte Esterase Urine Small (A) NEG^Negative    Source Catheterized Urine     WBC Urine 5-10 (A) OTO5^0 - 5 /HPF    RBC Urine 10-25 (A) OTO2^O - 2 /HPF    Bacteria Urine Few (A) NEG^Negative /HPF     *Note: Due to a large number of results and/or encounters for the requested time period, some results have not been displayed. A complete set of results can be found in Results Review.       ASSESSMENT/PLAN:       1. Dysuria  - UTI, last urine culture bacteria was resistant to ciprofloxacin, will treat with tetracycline based on these findings (as pt was prescribed this following that result) and await culture results   - UA with Microscopic reflex to Culture  - Urine Culture Aerobic Bacterial  - tetracycline (ACHROMYCIN/SUMYCIN) 500 MG capsule; Take 1 capsule (500 mg) by mouth 2 times daily  Dispense: 28 capsule; Refill: 0  - reviewed symptoms that should prompt urgent follow up   - pt agreed to above plan and all questions are answered     Machelle Solorzano PA-C  [facility]

## 2018-05-24 NOTE — PROGRESS NOTES
CM received call from member - she states she was calling to check in.  She states she has another UTI - she was at dr today.  They are waiting for culture to come back to decide which med to treat.   She states that she is feeling tired but knows she will feel better once atbx start.   She states that she is still planning to move to [place] with her sister in near future.  She is looking forward to it and hoping the warmer climate will help her health.     Jamie Sharma RN, PHN  [facility] [employer]

## 2018-05-24 NOTE — TELEPHONE ENCOUNTER
Switched to doxycycline, will await culture and switch based on bacterial findings tomorrow if needed.

## 2018-05-24 NOTE — PATIENT INSTRUCTIONS
Do not take vitamins or sucralfate with tetracycline     Need to tetracycline 2 hours before sucralfate and vitamins

## 2018-05-24 NOTE — MR AVS SNAPSHOT
After Visit Summary   5/24/2018    Sonya Foote    MRN: [id number]           Patient Information     Date Of Birth          1950        Visit Information        Provider Department      5/24/2018 10:20 AM Machelle Solorzano PA-C [facility][1]        Today's Diagnoses     Dysuria          Care Instructions    Do not take vitamins or sucralfate with tetracycline     Need to tetracycline 2 hours before sucralfate and vitamins           Follow-ups after your visit        Your next 10 appointments already scheduled     May 29, 2018 11:00 AM CDT   L/Vision Treatment with Manuela Mitchell OT   [facility] ([facility])    [address] [facility] [address]   [phone]            Jun 12, 2018  9:30 AM CDT   Return Sleep Patient with Guanaco Kowalski MD   [facility] ([facility])    [address]  [facility] [address]   [phone]            Jun 26, 2018 10:45 AM CDT   Return Visit with Bj Anderson MD   [facility] ([facility])    [address]  [facility] [address]   [phone] OPT 2            Jul 26, 2018 10:15 AM CDT   RETURN GLAUCOMA with Marely Robin MD   [facility] ([facility])    [address]  [address] [address]  [facility] [address]   [phone]            Aug 13, 2018  9:00 AM CDT   (Arrive by 8:45 AM)   Return Visit with Alina Jennings MD   [facility] ([facility])    [address]  [address]  [place] [facility] [address] [phone]              Who to contact     If you have questions or need follow up information about today's clinic visit or your schedule please contact [facility] directly at [phone].  Normal or  "non-critical lab and imaging results will be communicated to you by MyChart, letter or phone within 4 business days after the clinic has received the results. If you do not hear from us within 7 days, please contact the clinic through [employer] or phone. If you have a critical or abnormal lab result, we will notify you by phone as soon as possible.  Submit refill requests through [employer] or call your pharmacy and they will forward the refill request to us. Please allow 3 business days for your refill to be completed.          Additional Information About Your Visit        In[employer] Information     [employer] lets you send messages to your doctor, view your test results, renew your prescriptions, schedule appointments and more. To sign up, go to [email or web address] . Click on \"Log in\" on the left side of the screen, which will take you to the Welcome page. Then click on \"Sign up Now\" on the right side of the page.     You will be asked to enter the access code listed below, as well as some personal information. Please follow the directions to create your username and password.     Your access code is: [email or web address]  Expires: 2018 11:08 AM     Your access code will  in 90 days. If you need help or a new code, please call your [facility] clinic or [phone].        Care EveryWhere ID     This is your Care EveryWhere ID. This could be used by other organizations to access your [facility] medical records  [id number]        Your Vitals Were     Pulse Temperature Respirations Pulse Oximetry Breastfeeding?       73 98.3  F (36.8  C) (Oral) 8 97% No        Blood Pressure from Last 3 Encounters:   18 134/80   18 100/63   18 142/72    Weight from Last 3 Encounters:   18 138 lb (62.6 kg)   18 138 lb (62.6 kg)   18 131 lb 6.4 oz (59.6 kg)              We Performed the Following     UA with Microscopic reflex to Culture     Urine Culture Aerobic Bacterial          Today's Medication " Changes          These changes are accurate as of 5/24/18 11:43 AM.  If you have any questions, ask your nurse or doctor.               Start taking these medicines.        Dose/Directions    tetracycline 500 MG capsule   Commonly known as:  ACHROMYCIN/SUMYCIN   Used for:  Dysuria   Started by:  Machelle Solorzano PA-C        Dose:  500 mg   Take 1 capsule (500 mg) by mouth 2 times daily   Quantity:  28 capsule   Refills:  0         These medicines have changed or have updated prescriptions.        Dose/Directions    Phenytoin Sodium Extended 200 MG Caps   This may have changed:  additional instructions   Used for:  Seizure disorder (H)        Dose:  200 mg   Take 200 mg by mouth 2 times daily   Quantity:  60 capsule   Refills:  0            Where to get your medicines      These medications were sent to [id number] - [place], [place] - [address]  [address], [facility] [address]     Phone:  [phone]     tetracycline 500 MG capsule                Primary Care Provider Office Phone # Fax #    Allan Casey [phone]       [address] [address]        Equal Access to Services     IRAJ CARREON : Hadii aad ku hadasho Soomaali, waaxda luqadaha, qaybta kaalmada adeegyada, tonie marvin . So [facility] [phone].    ATENCIÓN: Si habla español, tiene a briones disposición servicios gratuitos de asistencia lingüística. Ll[facility] [phone].    We comply with applicable federal civil rights laws and [place] laws. We do not discriminate on the basis of race, color, national origin, age, disability, sex, sexual orientation, or gender identity.            Thank you!     Thank you for choosing [facility]  for your care. Our goal is always to provide you with excellent care. Hearing back from our patients is one way we can continue to improve our services. Please take a few minutes to complete the  written survey that you may receive in the mail after your visit with us. Thank you!             Your Updated Medication List - Protect others around you: Learn how to safely use, store and throw away your medicines at www.disposemymeds.org.          This list is accurate as of 5/24/18 11:43 AM.  Always use your most recent med list.                   Brand Name Dispense Instructions for use Diagnosis    ACETAMINOPHEN PO      Take 1,000 mg by mouth every 4 hours as needed for pain Not to exceed 4000 mg/day        ADVAIR DISKUS 250-50 MCG/DOSE diskus inhaler   Generic drug:  fluticasone-salmeterol     60 Inhaler    Inhale 1 puff into the lungs 2 times daily    Acute bronchitis       * albuterol (2.5 MG/3ML) 0.083% neb solution     360 mL    INHALE 1 VIAL VIA NEBULIZER EVERY 6 HOURS AS NEEDED    Chronic obstructive pulmonary disease, unspecified COPD type (H)       * VENTOLIN  (90 Base) MCG/ACT Inhaler   Generic drug:  albuterol     3 Inhaler    Inhale 2 puffs into the lungs every 6 hours as needed for shortness of breath / dyspnea or wheezing    Chronic obstructive pulmonary disease, unspecified COPD type (H)       aspirin 81 MG EC tablet     90 tablet    Take 1 tablet (81 mg) by mouth daily    Unstable angina (H)       atorvastatin 40 MG tablet    LIPITOR    90 tablet    Take 1 tablet (40 mg) by mouth daily    Hyperlipidemia LDL goal <100       blood glucose monitoring lancets     100 each    Use to test blood sugar 3 times daily or as directed.    Type 2 diabetes mellitus with diabetic peripheral angiopathy without gangrene, without long-term current use of insulin (H)       blood glucose monitoring meter device kit     1 kit    Use to test blood sugars 3 times daily or as directed.    Type 2 diabetes, HbA1C goal < 8% (H)       blood glucose monitoring test strip    ONETOUCH ULTRA    3 Box    Use to test blood sugars 3 times daily or as directed.    Type 2 diabetes mellitus with diabetic peripheral  angiopathy without gangrene, without long-term current use of insulin (H)       brimonidine 0.2 % ophthalmic solution    ALPHAGAN    1 Bottle    Place 1 drop into the right eye 2 times daily    Primary open angle glaucoma of both eyes, severe stage       calcium citrate-vitamin D 315-250 MG-UNIT Tabs per tablet    CITRACAL    120 tablet    Take 2 tablets by mouth daily    Osteoporosis       cetirizine 10 MG tablet    zyrTEC    90 tablet    Take 1 tablet (10 mg) by mouth daily as needed for allergies    Seasonal allergic rhinitis, unspecified allergic rhinitis trigger       CYANOCOBALAMIN PO      Take 2,000 mcg by mouth daily        diclofenac 1 % Gel topical gel    VOLTAREN    100 g    Apply 2 g topically 4 times daily to hands    Primary osteoarthritis of both hands       dorzolamide 2 % ophthalmic solution    TRUSOPT    1 Bottle    Place 1 drop into the right eye 2 times daily    Primary open angle glaucoma of both eyes, severe stage       EPINEPHrine 0.3 MG/0.3ML injection 2-pack    EPIPEN/ADRENACLICK/or ANY BX GENERIC EQUIV    0.6 mL    Inject 0.3 mLs (0.3 mg) into the muscle as needed for anaphylaxis    Bee sting reaction, undetermined intent, subsequent encounter       escitalopram 10 MG tablet    LEXAPRO     Take 10 mg by mouth daily        ESTRACE VAGINAL 0.1 MG/GM cream   Generic drug:  estradiol     42.5 g    USE DIME SIZE AMOUNT 3X A WEEK AT NIGHT    Atrophic vaginitis       estradiol 1 MG tablet    ESTRACE    90 tablet    Take 1 tablet (1 mg) by mouth daily    Person who has had sex change operation       ferrous sulfate 325 (65 Fe) MG tablet    IRON    60 tablet    Take 1 tablet (325 mg) by mouth 2 times daily With meals        fluticasone 50 MCG/ACT spray    FLONASE     Spray 1 spray into both nostrils daily        guaiFENesin-codeine 100-10 MG/5ML Soln solution    ROBITUSSIN AC    120 mL    Take 5 mLs by mouth every 4 hours as needed for cough    Upper respiratory tract infection, unspecified type        hydrochlorothiazide 12.5 MG capsule    MICROZIDE    90 capsule    Take 1 capsule (12.5 mg) by mouth daily    Essential hypertension with goal blood pressure less than 130/80       INCRUSE ELLIPTA 62.5 MCG/INH oral inhaler   Generic drug:  umeclidinium      Inhale 1 puff into the lungs daily        lactulose 10 GM/15ML solution    CHRONULAC     Take 20 g by mouth as needed for constipation        latanoprost 0.005 % ophthalmic solution    XALATAN    2.5 mL    Place 1 drop into both eyes At Bedtime    Primary open angle glaucoma of both eyes, severe stage       levETIRAcetam 500 MG tablet    KEPPRA    180 tablet    Take 1 tablet (500 mg) by mouth 2 times daily    Nausea       lidocaine 5 % Patch    LIDODERM    30 patch    APPLY 1 TO 3 PATCHES TO PAINFUL AREA AT ONCE FOR UP TO 12 HOURS WITHIN A 24 HOUR PERIOD REMOVE AFTER 12 HOURS    Chronic pain syndrome, 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 (H)       lisinopril 20 MG tablet    PRINIVIL/ZESTRIL    90 tablet    Take 1 tablet (20 mg) by mouth daily    History of coronary artery disease       MEDICATION GIVEN BY INTRATHECAL PUMP - INSTRUCTION      continuous 2/8/18: Pump managed by [facility] in [place] [phone].  Conc: Bupivacaine 20 mg/mL and Fentanyl 2000 mcg/mL, morphine 2 mg/mL Continuous:  Fentanyl 796 mcg/day, Bupivacaine 7.96 mg/day, Morphine 0.796 mg/day  Boluses: Up to 7 boluses per 24-hr period of Bupivicaine 0.5994 mg and Fentanyl 59.9 mcg morphine 0.0599 mg. Pump Refill Date 02/28/18        metFORMIN 500 MG 24 hr tablet    GLUCOPHAGE-XR    90 tablet    Take 1 tablet (500 mg) by mouth daily (with dinner)    Type 2 diabetes mellitus with diabetic peripheral angiopathy and gangrene, without long-term current use of insulin (H)       metoprolol succinate 50 MG 24 hr tablet    TOPROL-XL    90 tablet    Take 1 tablet (50 mg) by mouth daily    History of coronary artery disease       MULTIVITAMIN PO       Take 1 tablet by mouth daily        nicotine 14 MG/24HR 24 hr patch    NICODERM CQ    30 patch    Place 1 patch onto the skin daily    Tobacco dependence syndrome       nitroGLYcerin 0.4 MG sublingual tablet    NITROSTAT    25 tablet    Place 1 tablet (0.4 mg) under the tongue every 5 minutes as needed for chest pain if you are still having symptoms after 3 doses (15 minutes) call 911.    Chronic systolic congestive heart failure (H), Old myocardial infarction       ondansetron 8 MG ODT tab    ZOFRAN-ODT    30 tablet    Take 1 tablet (8 mg) by mouth every 8 hours as needed    Other migraine without status migrainosus, not intractable       order for DME     1 Month    Equipment being ordered: Depends briefs    Incontinence       order for DME     1 Device    Equipment being ordered: Power Wheelchair    CVA (cerebral infarction), HTN (hypertension)       order for DME     1 Box    Equipment being ordered: Glucerna daily shakes with each meal    Type 2 diabetes mellitus with other diabetic neurological complication       * order for DME     1 Units    Equipment being ordered: Nebulizer and tubing supplies    Simple chronic bronchitis (H)       * order for DME     1 Device    Equipment being ordered: CPAP supplies mask, hose, filters, cushion fax to [facility] at [phone] Disp: 10 DeviceRefills: prn Class: Local PrintStart: 2/10/2017    Chronic obstructive pulmonary disease, unspecified COPD type (H)       * order for DME     10 Device    Equipment being ordered: CPAP supplies mask, hose, filters, cushion  fax to [facility] at [phone]    COPD (chronic obstructive pulmonary disease) (H)       * order for DME     1 Device    Hospital bed with side rails    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 (H)       * order for DME     1 Device    Full electric hospital bed with half rails  Dx: O92020, I110, J449 Length of need: lifetime    Status post bilateral above knee amputation (H)       *  order for DME     1 Device    Wheel Chair Cushion: 18 x 18 inch Roho cushion    Status post bilateral above knee amputation (H)       order for DME     1 Package    Equipment being ordered: bandage tape, prefer paper    Burn of skin       pantoprazole 40 MG EC tablet    PROTONIX    30 tablet    Take 1 tablet (40 mg) by mouth daily    Gastroesophageal reflux disease without esophagitis       Phenytoin Sodium Extended 200 MG Caps     60 capsule    Take 200 mg by mouth 2 times daily    Seizure disorder (H)       polyethylene glycol Packet    MIRALAX/GLYCOLAX     Take 17 g by mouth daily Dissolved in water or juice        pregabalin 75 MG capsule    LYRICA    120 capsule    Take 2 capsules (150 mg) by mouth 2 times daily    Pain in both upper extremities       PROCHLORPERAZINE MALEATE PO      Take 10 mg by mouth every 8 hours as needed for nausea or vomiting        risperiDONE 0.5 MG tablet    risperDAL     Take 0.5 mg by mouth At Bedtime        silver sulfADIAZINE 1 % cream    SILVADENE    50 g    Apply topically 2 times daily    Burn       sucralfate 1 GM tablet    CARAFATE    120 tablet    Take 1 tablet (1 g) by mouth 4 times daily May dissolve in 10 mL water is necessary. (Start upon completion of carafate suspension)    Adjustment disorder with depressed mood       tetracycline 500 MG capsule    ACHROMYCIN/SUMYCIN    28 capsule    Take 1 capsule (500 mg) by mouth 2 times daily    Dysuria       traZODone 50 MG tablet    DESYREL     Take 150 mg by mouth At Bedtime        vitamin D 2000 units tablet     100 tablet    Take 2,000 Units by mouth daily.        zinc 50 MG Tabs      Take 1 tablet by mouth daily        * Notice:  This list has 8 medication(s) that are the same as other medications prescribed for you. Read the directions carefully, and ask your doctor or other care provider to review them with you.

## 2018-05-24 NOTE — TELEPHONE ENCOUNTER
tetracycline; not covered by insurance; [facility] # [phone] with an alternative

## 2018-05-25 LAB
BACTERIA SPEC CULT: NO GROWTH
SPECIMEN SOURCE: NORMAL

## 2018-05-25 ASSESSMENT — ASTHMA QUESTIONNAIRES: ACT_TOTALSCORE: 19

## 2018-05-29 ENCOUNTER — HOSPITAL ENCOUNTER (OUTPATIENT)
Dept: OCCUPATIONAL THERAPY | Facility: CLINIC | Age: 68
Setting detail: THERAPIES SERIES
End: 2018-05-29
Attending: INTERNAL MEDICINE
Payer: COMMERCIAL

## 2018-05-29 PROCEDURE — G8989 SELF CARE D/C STATUS: HCPCS | Mod: GO,CJ | Performed by: OCCUPATIONAL THERAPIST

## 2018-05-29 PROCEDURE — 40000249 ZZH STATISTIC VISIT LOW VISION CLINIC: Performed by: OCCUPATIONAL THERAPIST

## 2018-05-29 PROCEDURE — G8988 SELF CARE GOAL STATUS: HCPCS | Mod: GO,CJ | Performed by: OCCUPATIONAL THERAPIST

## 2018-05-29 PROCEDURE — 97535 SELF CARE MNGMENT TRAINING: CPT | Mod: GO | Performed by: OCCUPATIONAL THERAPIST

## 2018-05-30 NOTE — PROGRESS NOTES
05/29/18 1100   Signing Clinician's Name / Credentials   Signing clinician's name / credentials Manuela Mitchell OTR/L   Session Number   Session Number 4   Reporting period 04/19-07/12   Authorization status authorized   Recertification   Recertification Due 07/12/18   Recertification Completed (DISCHARGE THIS DATE)   Progress Notes   Progress Note Due 07/12/18   OT Medicare Only G-code   G-code Self Care   Self Care   Current Self Care Modifier [place], Self Report Assessment, Clinical Observations   Self Care Goal,  (eval/re-eval, every progress note & discharge) CJ: 20-39% impairment   Self Care Discharge Status,  (discharge) CJ: 20-39% impairment   GOALS   Goals Near Vision;Visual Field;Environmental Modification;Resource Education   Goals Addressed this Session Near vision   Goal 1 - Near Vision   Goal Description: Near Vision Patient will verbalize awareness of visual field Loss and demonstrate improved use of visual skills/adaptive equipment for increased independence in reading-based activities of daily living, written communication and detail ADL tasks.   Near Vision Goal Comment goal met with cell phone adapatations to increase ind. in phone communications, digital calendar maintnence, text and emial communicatins, and writing adapations to accommodate arthritic hand limitations. Issued resoruces for magnifying mirror and large print THEODORE cards   Target Date 07/12/18   Date Met 05/29/18   Goal 2 - Visual field   Goal Description: Visual field Patient will verbalize awareness of visual field loss and demonstrate improved use of visual skills for increased safety/ independence in locating objects/obstacles and in navigation   Visual Field Goal Comment goal discharged.    Target Date 07/12/18   Goal 3 - Environmental modification   Goal Description: Environment modification Patient will demonstrate understanding of the impact of lighting, contrast and glare on ADL activities, in conjunction  "with environmentally-based ADL modifications   Environmental Modification Goal Comment goal met with use of plum fitover sunglasses for glare managment,head lamp for seeing at night, and task light   Target Date 07/12/18   Date Met 05/29/18   Goal 4 - Resource education   Goal Description: Resource education Patient will verbalize awareness of community resources for the following:;Audio access to print materials;Access to low vision devices   Resource Education Goal Comment goal met as written   Target Date 07/12/18   Therapy Diagnosis   Therapy  Diagnosis: Impaired ADL/IADL with deficits in Reading based ADL;Written communication;Home management;Financial management;Grooming;Eating  (location of objects, management of light and glare)   Subjective Report   Subjective Report \"I got a headlamp.  It really helps with my mobility and finding things at night\"   Visual Rehab Treatment [place]   Daily Treatment [place] Self Care/Home Management   Self Care/Home Management   Minutes 55   Skilled Intervention Training in use of electronic aids for reading;Training in written communication strategies, writing implements;Organizational strategies incorporating contrast  (grooming)   Patient Response vebalized and demonstrated good understanding   Treatment Detail Written communcaiton: pt reports the greatest barrier to writing is her arthritic fingers. Pt trialed \"Penagain\" pen and reported \"I could write with this.   It doesn't hurt my fingers\"  Resources were issued. Grooming: pt trialed magnifiying mirror. Identified 5x magnifiying mirror as optimal for grooming ind. Resources issued. Electronic communicaiton: completed instruction in use of VoiceOver for screen reading on iphone,  Pt very pleased with this increase in her functionality for reading.  Summary issued   Progress goals met   [place] Read   Smallest print size read access to all print sizes digitally via Voice Over   Assessments Completed   Assessments Completed " SRAVFPP   Education   Learner Patient   Readiness Eager;Acceptance   Method Booklet/handout;Explanation;Demonstration   Response Verbalizes understanding;Demonstrates understanding   Communication with other professionals   Communication with other professionals per EHR   Plan   Plan for next session discharge   Total Session Time   Timed Code Treatment Minutes 55   Total Treatment Time (sum of timed and untimed services) 55

## 2018-05-31 ENCOUNTER — HOSPITAL ENCOUNTER (EMERGENCY)
Facility: CLINIC | Age: 68
Discharge: HOME | End: 2018-05-31
Attending: EMERGENCY MEDICINE | Admitting: EMERGENCY MEDICINE
Payer: COMMERCIAL

## 2018-05-31 ENCOUNTER — APPOINTMENT (OUTPATIENT)
Dept: CT IMAGING | Facility: CLINIC | Age: 68
End: 2018-05-31
Attending: EMERGENCY MEDICINE
Payer: COMMERCIAL

## 2018-05-31 ENCOUNTER — PATIENT OUTREACH (OUTPATIENT)
Dept: GERIATRIC MEDICINE | Facility: CLINIC | Age: 68
End: 2018-05-31

## 2018-05-31 VITALS
HEART RATE: 71 BPM | SYSTOLIC BLOOD PRESSURE: 115 MMHG | DIASTOLIC BLOOD PRESSURE: 79 MMHG | TEMPERATURE: 97.5 F | RESPIRATION RATE: 16 BRPM | OXYGEN SATURATION: 99 %

## 2018-05-31 DIAGNOSIS — N32.89 BLADDER SPASMS: ICD-10-CM

## 2018-05-31 LAB
ALBUMIN UR-MCNC: NEGATIVE MG/DL
ANION GAP SERPL CALCULATED.3IONS-SCNC: 9 MMOL/L (ref 3–14)
APPEARANCE UR: CLEAR
BASOPHILS # BLD AUTO: 0 10E9/L (ref 0–0.2)
BASOPHILS NFR BLD AUTO: 0.3 %
BILIRUB UR QL STRIP: NEGATIVE
BUN SERPL-MCNC: 11 MG/DL (ref 7–30)
CALCIUM SERPL-MCNC: 8.6 MG/DL (ref 8.5–10.1)
CHLORIDE SERPL-SCNC: 103 MMOL/L (ref 94–109)
CO2 SERPL-SCNC: 26 MMOL/L (ref 20–32)
COLOR UR AUTO: ABNORMAL
CREAT SERPL-MCNC: 0.52 MG/DL (ref 0.52–1.04)
DIFFERENTIAL METHOD BLD: ABNORMAL
EOSINOPHIL # BLD AUTO: 0.2 10E9/L (ref 0–0.7)
EOSINOPHIL NFR BLD AUTO: 2.1 %
ERYTHROCYTE [DISTWIDTH] IN BLOOD BY AUTOMATED COUNT: 13.6 % (ref 10–15)
GFR SERPL CREATININE-BSD FRML MDRD: >90 ML/MIN/1.7M2
GLUCOSE SERPL-MCNC: 102 MG/DL (ref 70–99)
GLUCOSE UR STRIP-MCNC: NEGATIVE MG/DL
HCT VFR BLD AUTO: 33.9 % (ref 35–47)
HGB BLD-MCNC: 11.7 G/DL (ref 11.7–15.7)
HGB UR QL STRIP: ABNORMAL
IMM GRANULOCYTES # BLD: 0 10E9/L (ref 0–0.4)
IMM GRANULOCYTES NFR BLD: 0.2 %
KETONES UR STRIP-MCNC: NEGATIVE MG/DL
LEUKOCYTE ESTERASE UR QL STRIP: NEGATIVE
LYMPHOCYTES # BLD AUTO: 1.9 10E9/L (ref 0.8–5.3)
LYMPHOCYTES NFR BLD AUTO: 19.8 %
MCH RBC QN AUTO: 30.2 PG (ref 26.5–33)
MCHC RBC AUTO-ENTMCNC: 34.5 G/DL (ref 31.5–36.5)
MCV RBC AUTO: 87 FL (ref 78–100)
MONOCYTES # BLD AUTO: 1 10E9/L (ref 0–1.3)
MONOCYTES NFR BLD AUTO: 10.1 %
NEUTROPHILS # BLD AUTO: 6.5 10E9/L (ref 1.6–8.3)
NEUTROPHILS NFR BLD AUTO: 67.5 %
NITRATE UR QL: NEGATIVE
NRBC # BLD AUTO: 0 10*3/UL
NRBC BLD AUTO-RTO: 0 /100
PH UR STRIP: 7 PH (ref 5–7)
PLATELET # BLD AUTO: 232 10E9/L (ref 150–450)
POTASSIUM SERPL-SCNC: 3.5 MMOL/L (ref 3.4–5.3)
RBC # BLD AUTO: 3.88 10E12/L (ref 3.8–5.2)
RBC #/AREA URNS AUTO: <1 /HPF (ref 0–2)
SODIUM SERPL-SCNC: 138 MMOL/L (ref 133–144)
SOURCE: ABNORMAL
SP GR UR STRIP: 1 (ref 1–1.03)
UROBILINOGEN UR STRIP-MCNC: NORMAL MG/DL (ref 0–2)
WBC # BLD AUTO: 9.6 10E9/L (ref 4–11)
WBC #/AREA URNS AUTO: <1 /HPF (ref 0–5)

## 2018-05-31 PROCEDURE — 85025 COMPLETE CBC W/AUTO DIFF WBC: CPT | Performed by: EMERGENCY MEDICINE

## 2018-05-31 PROCEDURE — 81001 URINALYSIS AUTO W/SCOPE: CPT | Performed by: EMERGENCY MEDICINE

## 2018-05-31 PROCEDURE — 74176 CT ABD & PELVIS W/O CONTRAST: CPT

## 2018-05-31 PROCEDURE — 25000125 ZZHC RX 250: Performed by: EMERGENCY MEDICINE

## 2018-05-31 PROCEDURE — 99284 EMERGENCY DEPT VISIT MOD MDM: CPT | Mod: 25 | Performed by: EMERGENCY MEDICINE

## 2018-05-31 PROCEDURE — 87086 URINE CULTURE/COLONY COUNT: CPT | Performed by: EMERGENCY MEDICINE

## 2018-05-31 PROCEDURE — 80048 BASIC METABOLIC PNL TOTAL CA: CPT | Performed by: EMERGENCY MEDICINE

## 2018-05-31 PROCEDURE — 99283 EMERGENCY DEPT VISIT LOW MDM: CPT | Mod: Z6 | Performed by: EMERGENCY MEDICINE

## 2018-05-31 RX ORDER — OXYCODONE HYDROCHLORIDE 5 MG/1
5 TABLET ORAL EVERY 6 HOURS PRN
Qty: 5 TABLET | Refills: 0 | Status: SHIPPED | OUTPATIENT
Start: 2018-05-31 | End: 2018-06-06

## 2018-05-31 RX ADMIN — ATROPA BELLADONNA AND OPIUM 0.5 SUPPOSITORY: 16.2; 3 SUPPOSITORY RECTAL at 04:15

## 2018-05-31 ASSESSMENT — ENCOUNTER SYMPTOMS
ABDOMINAL PAIN: 0
HEMATURIA: 0
HEADACHES: 0
VOMITING: 0
FEVER: 0
FLANK PAIN: 0
DIARRHEA: 0
DYSURIA: 1
COUGH: 1

## 2018-05-31 NOTE — PROGRESS NOTES
CC received notification of Emergency Room visit.  ER visit occurred on 5/31/18 at [facility] with Dx of bladder spasms.    CC contacted member and reviewed the discharge summary.  Member was given a B&O supp which lessened the pain.   She is to follow-up with her PCP for reevaluation of long-term management of bladder spasms.   Member has a follow-up appointment with PCP: No: Offered Assistance with setting up a follow up appointment.  Member will schedule appt herself.   Member has had a change in condition: No  New referrals placed: No  Home Visit Needed: No  Care plan reviewed and updated.  PCP notified of ED visit via EMR.  Jamie Sharma RN, PHN  [facility]

## 2018-05-31 NOTE — ED PROVIDER NOTES
History     Chief Complaint   Patient presents with     Dysuria     HPI  Sonya Foote is a 68 year old female with a history of schizoaffective disorder, type 2 DM, CAD, ischemic cardiomyopathy, and prior CVA who presents with dysuria.  She does have a suprapubic catheter in place which was exchanged 9 days ago.  She was seen for dysuria 1 week ago and started on tetracycline for possible UTI.  She reports she was starting to improve however in the last few days has had recurrent dysuria around her catheter site.  She reports that she had an episode of leakage from the catheter site as well as urine drainage from the urethra that caused significant pain.  She is not currently on any medications for bladder spasm.  She denies any hematuria or cloudiness in the urine.  She denies any fevers or flank pain.  She has not had any other abdominal pain with the exception of pain around her catheter.  She denies any vomiting or diarrhea.  She does have a history of COPD and long-standing cough which she reports is unchanged. She has had similar symptoms in the past with UTIs and concerned she has an infection.     I have reviewed the Medications, Allergies, Past Medical and Surgical History, and Social History in the Epic system.    Review of Systems   Constitutional: Negative for fever.   Respiratory: Positive for cough.    Cardiovascular: Negative for chest pain.   Gastrointestinal: Negative for abdominal pain, diarrhea and vomiting.   Genitourinary: Positive for dysuria and pelvic pain. Negative for flank pain and hematuria.   Neurological: Negative for headaches.   All other systems reviewed and are negative.      Physical Exam   BP: 115/59  Pulse: 69  Temp: 97.5  F (36.4  C)  SpO2: 98 %      Physical Exam  General: patient is alert and oriented and in no acute distress   Head: atraumatic and normocephalic   EENT: moist mucus membranes, sclera anicteric   Neck: supple   Cardiovascular: regular rate and rhythm,  extremities warm and well perfused  Pulmonary: lungs clear to auscultation bilaterally   Abdomen: soft, suprapubic TTP, no guarding, no other abdominal pain on palpation, suprapubic catheter in place without surrounding erythema or induration, catheter is freely draining, no blood clots noted  Musculoskeletal: normal range of motion, bilateral BKA  Neurological: alert and oriented, moving all extremities symmetrically, gait normal   Skin: warm, dry, no redness at catheter site    ED Course     ED Course     Procedures             Critical Care time:  none             Labs Ordered and Resulted from Time of ED Arrival Up to the Time of Departure from the ED - No data to display         Assessments & Plan (with Medical Decision Making)   Ms. Foote is a 68 year old female with a history of schizoaffective disorder, type 2 DM, CAD, ischemic cardiomyopathy, and prior CVA who presents with dysuria.  She reports pain primarily along the suprapubic catheter site.  The catheter is draining without any difficulty and has no evidence of hematuria.  UA is not suggestive of UTI.  She has no leukocytosis and electrolytes as well as creatinine are within normal limits.  She did go for a CT of the abdomen to rule out other intra-abdominal pathology which is negative, catheter in appropriate position.  She was given B&O suppository for bladder spasm and on re-evaluation is feeling significantly improved.  Suspect that her pain is due to bladder spasms.  Patient will be discharged home with instructions to follow-up with her primary care provider for reevaluation of long-term management of bladder spasms.  She was given close return instructions for the emergency department and voiced understanding.    I have reviewed the nursing notes.    I have reviewed the findings, diagnosis, plan and need for follow up with the patient.    Discharge Medication List as of 5/31/2018  6:20 AM      START taking these medications    Details    oxyCODONE IR (ROXICODONE) 5 MG tablet Take 1 tablet (5 mg) by mouth every 6 hours as needed for pain, Disp-5 tablet, R-0, Local Print             Final diagnoses:   Bladder spasms       5/31/2018   [facility], [place], EMERGENCY DEPARTMENT     Elizabeth Amezquita MD  05/31/18 0068

## 2018-05-31 NOTE — ED NOTES
Report given to Martine BROOKE at [facility]. All questions concerns addressed.

## 2018-05-31 NOTE — ED TRIAGE NOTES
PT ARRIVED FROM [facility]. SHE PRESENTS WITH DYSURIA. SHE HAD A HER SUPRAPUBIC CATH CHANGED 1 WEEK AGO. SHE FINISHED A COURSE OF ANTIBIOTICS FOR A UTI TODAY. SHE HAD HER LEGS AMPUTATED IN 2016 FOR A BLOOD CLOT. SHE DID URINATE OUT OF HER VAGINA TONIGHT AND SCREAMED FROM PAIN, THIS IS NOT A USUAL OCCURRENCE. SHE WAS DIAGNOSED WITH A UTI 12 DAYS AGO,

## 2018-05-31 NOTE — ED AVS SNAPSHOT
[facility] [place], Emergency Department    [address] [place] [address]    Phone:  [phone]                                       Sonya Foote   MRN: [id number]    Department:  [facility], Emergency Department   Date of Visit:  5/31/2018           After Visit Summary Signature Page     I have received my discharge instructions, and my questions have been answered. I have discussed any challenges I see with this plan with the nurse or doctor.    ..........................................................................................................................................  Patient/Patient Representative Signature      ..........................................................................................................................................  Patient Representative Print Name and Relationship to Patient    ..................................................               ................................................  Date                                            Time    ..........................................................................................................................................  Reviewed by Signature/Title    ...................................................              ..............................................  Date                                                            Time

## 2018-05-31 NOTE — DISCHARGE INSTRUCTIONS
Please make an appointment to follow up with Your Primary Care Provider as soon as possible to discuss possibility of starting long term medications for bladder spasm.    You may use oxycodone sparingly for recurrent pain.      If you have worsening pain, fever, blood in the urine or other concerns, return to the emergency department for re-evaluation.

## 2018-05-31 NOTE — ED AVS SNAPSHOT
[facility], Emergency Department    [address] [facility] [address]    Phone:  [phone]                                       Sonya Foote   MRN: [id number]    Department:  [facility], Emergency Department   Date of Visit:  5/31/2018           Patient Information     Date Of Birth          1950        Your diagnoses for this visit were:     Bladder spasms        You were seen by Elizabeth Amezquita MD.        Discharge Instructions       Please make an appointment to follow up with Your Primary Care Provider as soon as possible to discuss possibility of starting long term medications for bladder spasm.    You may use oxycodone sparingly for recurrent pain.      If you have worsening pain, fever, blood in the urine or other concerns, return to the emergency department for re-evaluation.            Your next 10 appointments already scheduled     Jun 12, 2018  9:30 AM CDT   Return Sleep Patient with Guanaco Kowalski MD   [facility] ([facility])    [address]  [facility] [id number] [phone]            Jun 26, 2018 10:45 AM CDT   Return Visit with Bj Anderson MD   [facility] ([facility])    [address]  [facility] [address] [phone] OPT 2            Jul 26, 2018 10:15 AM CDT   RETURN GLAUCOMA with Marely Robin MD   [facility] ([facility])    [address]  [address]  [facility] [id number] [phone]            Aug 13, 2018  9:00 AM CDT   (Arrive by 8:45 AM)   Return Visit with Alina Jennings MD   [facility] ([facility])    [address]  [address]  [facility] [id number] [phone]              24 Hour Appointment Hotline       To make an appointment at any [facility], call  [phone] ([phone]). If you don't have a family doctor or clinic, we will help you find one. [facility] clinics are conveniently located to serve the needs of you and your family.             Review of your medicines      START taking        Dose / Directions Last dose taken    oxyCODONE IR 5 MG tablet   Commonly known as:  ROXICODONE   Dose:  5 mg   Quantity:  5 tablet        Take 1 tablet (5 mg) by mouth every 6 hours as needed for pain   Refills:  0          Our records show that you are taking the medicines listed below. If these are incorrect, please call your family doctor or clinic.        Dose / Directions Last dose taken    ACETAMINOPHEN PO   Dose:  1000 mg        Take 1,000 mg by mouth every 4 hours as needed for pain Not to exceed 4000 mg/day   Refills:  0        ADVAIR DISKUS 250-50 MCG/DOSE diskus inhaler   Dose:  1 puff   Quantity:  60 Inhaler   Generic drug:  fluticasone-salmeterol        Inhale 1 puff into the lungs 2 times daily   Refills:  11        * albuterol (2.5 MG/3ML) 0.083% neb solution   Quantity:  360 mL        INHALE 1 VIAL VIA NEBULIZER EVERY 6 HOURS AS NEEDED   Refills:  11        * VENTOLIN  (90 Base) MCG/ACT Inhaler   Dose:  2 puff   Quantity:  3 Inhaler   Generic drug:  albuterol        Inhale 2 puffs into the lungs every 6 hours as needed for shortness of breath / dyspnea or wheezing   Refills:  5        aspirin 81 MG EC tablet   Dose:  81 mg   Quantity:  90 tablet        Take 1 tablet (81 mg) by mouth daily   Refills:  3        atorvastatin 40 MG tablet   Commonly known as:  LIPITOR   Dose:  40 mg   Quantity:  90 tablet        Take 1 tablet (40 mg) by mouth daily   Refills:  2        blood glucose monitoring lancets   Quantity:  100 each        Use to test blood sugar 3 times daily or as directed.   Refills:  PRN        blood glucose monitoring meter device kit   Quantity:  1 kit        Use to test blood sugars 3 times daily or as directed.   Refills:  0         blood glucose monitoring test strip   Commonly known as:  ONETOUCH ULTRA   Quantity:  3 Box        Use to test blood sugars 3 times daily or as directed.   Refills:  3        brimonidine 0.2 % ophthalmic solution   Commonly known as:  ALPHAGAN   Dose:  1 drop   Quantity:  1 Bottle        Place 1 drop into the right eye 2 times daily   Refills:  11        calcium citrate-vitamin D 315-250 MG-UNIT Tabs per tablet   Commonly known as:  CITRACAL   Dose:  2 tablet   Quantity:  120 tablet        Take 2 tablets by mouth daily   Refills:  5        cetirizine 10 MG tablet   Commonly known as:  zyrTEC   Dose:  10 mg   Quantity:  90 tablet        Take 1 tablet (10 mg) by mouth daily as needed for allergies   Refills:  3        CYANOCOBALAMIN PO   Dose:  2000 mcg        Take 2,000 mcg by mouth daily   Refills:  0        diclofenac 1 % Gel topical gel   Commonly known as:  VOLTAREN   Dose:  2 g   Quantity:  100 g        Apply 2 g topically 4 times daily to hands   Refills:  11        dorzolamide 2 % ophthalmic solution   Commonly known as:  TRUSOPT   Dose:  1 drop   Quantity:  1 Bottle        Place 1 drop into the right eye 2 times daily   Refills:  11        EPINEPHrine 0.3 MG/0.3ML injection 2-pack   Commonly known as:  EPIPEN/ADRENACLICK/or ANY BX GENERIC EQUIV   Dose:  0.3 mg   Quantity:  0.6 mL        Inject 0.3 mLs (0.3 mg) into the muscle as needed for anaphylaxis   Refills:  1        escitalopram 10 MG tablet   Commonly known as:  LEXAPRO   Dose:  10 mg        Take 10 mg by mouth daily   Refills:  0        ESTRACE VAGINAL 0.1 MG/GM cream   Quantity:  42.5 g   Generic drug:  estradiol        USE DIME SIZE AMOUNT 3X A WEEK AT NIGHT   Refills:  11        estradiol 1 MG tablet   Commonly known as:  ESTRACE   Dose:  1 mg   Quantity:  90 tablet        Take 1 tablet (1 mg) by mouth daily   Refills:  3        ferrous sulfate 325 (65 Fe) MG tablet   Commonly known as:  IRON   Dose:  325 mg   Quantity:  60 tablet        Take 1  tablet (325 mg) by mouth 2 times daily With meals   Refills:  2        fluticasone 50 MCG/ACT spray   Commonly known as:  FLONASE   Dose:  1 spray        Spray 1 spray into both nostrils daily   Refills:  0        guaiFENesin-codeine 100-10 MG/5ML Soln solution   Commonly known as:  ROBITUSSIN AC   Dose:  1 tsp.   Quantity:  120 mL        Take 5 mLs by mouth every 4 hours as needed for cough   Refills:  0        hydrochlorothiazide 12.5 MG capsule   Commonly known as:  MICROZIDE   Dose:  12.5 mg   Quantity:  90 capsule        Take 1 capsule (12.5 mg) by mouth daily   Refills:  2        INCRUSE ELLIPTA 62.5 MCG/INH oral inhaler   Dose:  1 puff   Generic drug:  umeclidinium        Inhale 1 puff into the lungs daily   Refills:  0        lactulose 10 GM/15ML solution   Commonly known as:  CHRONULAC   Dose:  20 g        Take 20 g by mouth as needed for constipation   Refills:  0        latanoprost 0.005 % ophthalmic solution   Commonly known as:  XALATAN   Dose:  1 drop   Quantity:  2.5 mL        Place 1 drop into both eyes At Bedtime   Refills:  11        levETIRAcetam 500 MG tablet   Commonly known as:  KEPPRA   Dose:  500 mg   Quantity:  180 tablet        Take 1 tablet (500 mg) by mouth 2 times daily   Refills:  1        lidocaine 5 % Patch   Commonly known as:  LIDODERM   Quantity:  30 patch        APPLY 1 TO 3 PATCHES TO PAINFUL AREA AT ONCE FOR UP TO 12 HOURS WITHIN A 24 HOUR PERIOD REMOVE AFTER 12 HOURS   Refills:  5        lisinopril 20 MG tablet   Commonly known as:  PRINIVIL/ZESTRIL   Dose:  20 mg   Quantity:  90 tablet        Take 1 tablet (20 mg) by mouth daily   Refills:  0        MEDICATION GIVEN BY INTRATHECAL PUMP - INSTRUCTION        continuous 2/8/18: Pump managed by [facility] in [place] [phone].  Conc: Bupivacaine 20 mg/mL and Fentanyl 2000 mcg/mL, morphine 2 mg/mL Continuous:  Fentanyl 796 mcg/day, Bupivacaine 7.96 mg/day, Morphine 0.796 mg/day  Boluses: Up to 7  boluses per 24-hr period of Bupivicaine 0.5994 mg and Fentanyl 59.9 mcg morphine 0.0599 mg. Pump Refill Date 02/28/18   Refills:  0        metFORMIN 500 MG 24 hr tablet   Commonly known as:  GLUCOPHAGE-XR   Dose:  500 mg   Quantity:  90 tablet        Take 1 tablet (500 mg) by mouth daily (with dinner)   Refills:  3        metoprolol succinate 50 MG 24 hr tablet   Commonly known as:  TOPROL-XL   Dose:  50 mg   Quantity:  90 tablet        Take 1 tablet (50 mg) by mouth daily   Refills:  0        MULTIVITAMIN PO   Dose:  1 tablet        Take 1 tablet by mouth daily   Refills:  0        nicotine 14 MG/24HR 24 hr patch   Commonly known as:  NICODERM CQ   Dose:  1 patch   Quantity:  30 patch        Place 1 patch onto the skin daily   Refills:  0        nitroGLYcerin 0.4 MG sublingual tablet   Commonly known as:  NITROSTAT   Dose:  0.4 mg   Quantity:  25 tablet        Place 1 tablet (0.4 mg) under the tongue every 5 minutes as needed for chest pain if you are still having symptoms after 3 doses (15 minutes) call 911.   Refills:  1        ondansetron 8 MG ODT tab   Commonly known as:  ZOFRAN-ODT   Dose:  8 mg   Quantity:  30 tablet        Take 1 tablet (8 mg) by mouth every 8 hours as needed   Refills:  5        order for DME   Quantity:  1 Month        Equipment being ordered: Depends briefs   Refills:  11        order for DME   Quantity:  1 Device        Equipment being ordered: Power Wheelchair   Refills:  0        order for DME   Quantity:  1 Box        Equipment being ordered: Glucerna daily shakes with each meal   Refills:  11        * order for DME   Quantity:  1 Units        Equipment being ordered: Nebulizer and tubing supplies   Refills:  0        * order for DME   Quantity:  1 Device        Equipment being ordered: CPAP supplies mask, hose, filters, cushion fax to [facility] at [phone] Disp: 10 DeviceRefills: prn Class: Local PrintStart: 2/10/2017   Refills:  0        * order for DME   Quantity:  10  Device        Equipment being ordered: CPAP supplies mask, hose, filters, cushion  fax to [facility] at [phone]   Refills:  prn        * order for DME   Quantity:  1 Device        Hospital bed with side rails   Refills:  0        * order for DME   Quantity:  1 Device        Full electric hospital bed with half rails  Dx: P88983, I110, J449 Length of need: lifetime   Refills:  0        * order for DME   Quantity:  1 Device        Wheel Chair Cushion: 18 x 18 inch Roho cushion   Refills:  0        order for DME   Quantity:  1 Package        Equipment being ordered: bandage tape, prefer paper   Refills:  0        pantoprazole 40 MG EC tablet   Commonly known as:  PROTONIX   Dose:  40 mg   Quantity:  30 tablet        Take 1 tablet (40 mg) by mouth daily   Refills:  5        Phenytoin Sodium Extended 200 MG Caps   Dose:  200 mg   Quantity:  60 capsule        Take 200 mg by mouth 2 times daily   Refills:  0        polyethylene glycol Packet   Commonly known as:  MIRALAX/GLYCOLAX   Dose:  17 g        Take 17 g by mouth daily Dissolved in water or juice   Refills:  0        pregabalin 75 MG capsule   Commonly known as:  LYRICA   Dose:  150 mg   Quantity:  120 capsule        Take 2 capsules (150 mg) by mouth 2 times daily   Refills:  5        PROCHLORPERAZINE MALEATE PO   Dose:  10 mg        Take 10 mg by mouth every 8 hours as needed for nausea or vomiting   Refills:  0        risperiDONE 0.5 MG tablet   Commonly known as:  risperDAL   Dose:  0.5 mg        Take 0.5 mg by mouth At Bedtime   Refills:  0        silver sulfADIAZINE 1 % cream   Commonly known as:  SILVADENE   Quantity:  50 g        Apply topically 2 times daily   Refills:  0        sucralfate 1 GM tablet   Commonly known as:  CARAFATE   Dose:  1 g   Quantity:  120 tablet        Take 1 tablet (1 g) by mouth 4 times daily May dissolve in 10 mL water is necessary. (Start upon completion of carafate suspension)   Refills:  11        tetracycline 500 MG  capsule   Commonly known as:  ACHROMYCIN/SUMYCIN   Dose:  500 mg   Quantity:  28 capsule        Take 1 capsule (500 mg) by mouth 2 times daily   Refills:  0        traZODone 50 MG tablet   Commonly known as:  DESYREL   Dose:  150 mg        Take 150 mg by mouth At Bedtime   Refills:  0        vitamin D 2000 units tablet   Dose:  2000 Units   Quantity:  100 tablet        Take 2,000 Units by mouth daily.   Refills:  3        zinc 50 MG Tabs   Dose:  1 tablet        Take 1 tablet by mouth daily   Refills:  0        * Notice:  This list has 8 medication(s) that are the same as other medications prescribed for you. Read the directions carefully, and ask your doctor or other care provider to review them with you.            Information about OPIOIDS     PRESCRIPTION OPIOIDS: WHAT YOU NEED TO KNOW   You have a prescription for an opioid (narcotic) pain medicine. Opioids can cause addiction. If you have a history of chemical dependency of any type, you are at a higher risk of becoming addicted to opioids. Only take this medicine after all other options have been tried. Take it for as short a time and as few doses as possible.     Do not:    Drive. If you drive while taking these medicines, you could be arrested for driving under the influence (DUI).    Operate heavy machinery    Do any other dangerous activities while taking these medicines.     Drink any alcohol while taking these medicines.      Take with any other medicines that contain acetaminophen. Read all labels carefully. Look for the word  acetaminophen  or  Tylenol.  Ask your pharmacist if you have questions or are unsure.    Store your pills in a secure place, locked if possible. We will not replace any lost or stolen medicine. If you don t finish your medicine, please throw away (dispose) as directed by your pharmacist. The [place] Pollution [employer] has more information about safe disposal:  https://www.pca.[facility].[place].us/living-green/managing-unwanted-medications    All opioids tend to cause constipation. Drink plenty of water and eat foods that have a lot of fiber, such as fruits, vegetables, prune juice, apple juice and high-fiber cereal. Take a laxative (Miralax, milk of magnesia, Colace, Senna) if you don t move your bowels at least every other day.         Prescriptions were sent or printed at these locations (1 Prescription)                   Other Prescriptions                Printed at Department/Unit printer (1 of 1)         oxyCODONE IR (ROXICODONE) 5 MG tablet                Procedures and tests performed during your visit     Basic metabolic panel    CBC with platelets differential    CT Abdomen Pelvis w/o Contrast    UA with Microscopic    Urine Culture      Orders Needing Specimen Collection     None      Pending Results     Date and Time Order Name Status Description    5/31/2018 0422 CT Abdomen Pelvis w/o Contrast Preliminary     5/31/2018 0247 Urine Culture Preliminary             Pending Culture Results     Date and Time Order Name Status Description    5/31/2018 0247 Urine Culture Preliminary             Pending Results Instructions     If you had any lab results that were not finalized at the time of your Discharge, you can call the ED Lab Result RN at [phone]. You will be contacted by this team for any positive Lab results or changes in treatment. The nurses are available 7 days a week from 10A to 6:30P.  You can leave a message 24 hours per day and they will return your call.        Thank you for choosing [facility]       Thank you for choosing [facility] for your care. Our goal is always to provide you with excellent care. Hearing back from our patients is one way we can continue to improve our services. Please take a few minutes to complete the written survey that you may receive in the mail after you visit with us. Thank you!        Care EveryWhere ID     This is your Care  EveryWhere ID. This could be used by other organizations to access your [facility] medical records  [id number]        Equal Access to Services     KARI CARREON : John Cohen, reji glez, radha encarnacion, tonie marroquin. So [facility] [phone].    ATENCIÓN: Si habla español, tiene a briones disposición servicios gratuitos de asistencia lingüística. Llame al [phone].    We comply with applicable federal civil rights laws and [place] laws. We do not discriminate on the basis of race, color, national origin, age, disability, sex, sexual orientation, or gender identity.            After Visit Summary       This is your record. Keep this with you and show to your community pharmacist(s) and doctor(s) at your next visit.

## 2018-05-31 NOTE — ED NOTES
Called [facility] for transport back to [facility] with 45 minute ETA.

## 2018-05-31 NOTE — ED NOTES
Bed: ED11  Expected date:   Expected time: 1:35 AM  Means of arrival:   Comments:  N [phone]  68F  Possible UTI  Triaged yellow

## 2018-06-01 LAB
BACTERIA SPEC CULT: NO GROWTH
Lab: NORMAL
SPECIMEN SOURCE: NORMAL

## 2018-06-06 ENCOUNTER — PATIENT OUTREACH (OUTPATIENT)
Dept: GERIATRIC MEDICINE | Facility: CLINIC | Age: 68
End: 2018-06-06

## 2018-06-06 ENCOUNTER — OFFICE VISIT (OUTPATIENT)
Dept: INTERNAL MEDICINE | Facility: CLINIC | Age: 68
End: 2018-06-06
Payer: COMMERCIAL

## 2018-06-06 VITALS
TEMPERATURE: 98.1 F | BODY MASS INDEX: 52.47 KG/M2 | OXYGEN SATURATION: 98 % | RESPIRATION RATE: 16 BRPM | SYSTOLIC BLOOD PRESSURE: 112 MMHG | DIASTOLIC BLOOD PRESSURE: 64 MMHG | HEART RATE: 76 BPM | WEIGHT: 138 LBS

## 2018-06-06 DIAGNOSIS — E11.52 TYPE 2 DIABETES MELLITUS WITH DIABETIC PERIPHERAL ANGIOPATHY AND GANGRENE, WITHOUT LONG-TERM CURRENT USE OF INSULIN (H): ICD-10-CM

## 2018-06-06 DIAGNOSIS — N32.89 BLADDER SPASM: Primary | ICD-10-CM

## 2018-06-06 DIAGNOSIS — M19.041 PRIMARY OSTEOARTHRITIS OF BOTH HANDS: ICD-10-CM

## 2018-06-06 DIAGNOSIS — Z78.0 ASYMPTOMATIC POSTMENOPAUSAL STATUS: ICD-10-CM

## 2018-06-06 DIAGNOSIS — M19.042 PRIMARY OSTEOARTHRITIS OF BOTH HANDS: ICD-10-CM

## 2018-06-06 DIAGNOSIS — G40.909 SEIZURE DISORDER (H): ICD-10-CM

## 2018-06-06 DIAGNOSIS — G89.4 CHRONIC PAIN SYNDROME: ICD-10-CM

## 2018-06-06 DIAGNOSIS — Z11.59 NEED FOR HEPATITIS C SCREENING TEST: ICD-10-CM

## 2018-06-06 LAB
CHOLEST SERPL-MCNC: 182 MG/DL
HBA1C MFR BLD: 5.1 % (ref 0–5.6)
HDLC SERPL-MCNC: 53 MG/DL
LDLC SERPL CALC-MCNC: 103 MG/DL
NONHDLC SERPL-MCNC: 129 MG/DL
PHENYTOIN SERPL-MCNC: 14.3 MG/L (ref 10–20)
TRIGL SERPL-MCNC: 130 MG/DL

## 2018-06-06 PROCEDURE — 80061 LIPID PANEL: CPT | Performed by: INTERNAL MEDICINE

## 2018-06-06 PROCEDURE — 83036 HEMOGLOBIN GLYCOSYLATED A1C: CPT | Performed by: INTERNAL MEDICINE

## 2018-06-06 PROCEDURE — 80186 ASSAY OF PHENYTOIN FREE: CPT | Mod: 90 | Performed by: INTERNAL MEDICINE

## 2018-06-06 PROCEDURE — 36415 COLL VENOUS BLD VENIPUNCTURE: CPT | Performed by: INTERNAL MEDICINE

## 2018-06-06 PROCEDURE — 99214 OFFICE O/P EST MOD 30 MIN: CPT | Performed by: INTERNAL MEDICINE

## 2018-06-06 PROCEDURE — 86803 HEPATITIS C AB TEST: CPT | Performed by: INTERNAL MEDICINE

## 2018-06-06 PROCEDURE — 80185 ASSAY OF PHENYTOIN TOTAL: CPT | Performed by: INTERNAL MEDICINE

## 2018-06-06 PROCEDURE — 99000 SPECIMEN HANDLING OFFICE-LAB: CPT | Performed by: INTERNAL MEDICINE

## 2018-06-06 RX ORDER — OXYCODONE HYDROCHLORIDE 5 MG/1
5 TABLET ORAL EVERY 6 HOURS PRN
Qty: 5 TABLET | Refills: 0 | Status: SHIPPED | OUTPATIENT
Start: 2018-06-06 | End: 2019-04-17

## 2018-06-06 RX ORDER — OXYCODONE HYDROCHLORIDE 5 MG/1
5 TABLET ORAL EVERY 6 HOURS PRN
Qty: 5 TABLET | Refills: 0 | OUTPATIENT
Start: 2018-06-06

## 2018-06-06 ASSESSMENT — PAIN SCALES - GENERAL: PAINLEVEL: EXTREME PAIN (9)

## 2018-06-06 NOTE — TELEPHONE ENCOUNTER
Controlled Substance Refill Request for oxyCODONE IR (ROXICODONE) 5 MG tablet  Problem List Complete:  Yes    Last Written Prescription Date:  5/31/2018  Last Fill Quantity: 5,   # refills: 0    Last Office Visit with G primary care provider: 6/06/2018    Clinic visit frequency required: Q 6  months     Future Office visit:   Next 5 appointments (look out 90 days)     Jun 06, 2018 11:20 AM CDT   SHORT with Allan Casey MD   [facility] ([facility])    [address] [place] [id number] [phone]            Jun 26, 2018 10:45 AM CDT   Return Visit with Bj Anderson MD   [facility] ([facility])    [address] [place] [id number] [phone] OPT 2                  Controlled substance agreement on file: No.     Processing:  Mail to [facility] pharmacy   checked in past 6 months?  No, route to RN

## 2018-06-06 NOTE — MR AVS SNAPSHOT
After Visit Summary   6/6/2018    Sonya Foote    MRN: [id number]           Patient Information     Date Of Birth          1950        Visit Information        Provider Department      6/6/2018 11:20 AM Allan Casey MD [facility]        Today's Diagnoses     Bladder spasm    -  1    Primary osteoarthritis of both hands        Asymptomatic postmenopausal status        Seizure disorder (H)        Need for hepatitis C screening test           Follow-ups after your visit        Follow-up notes from your care team     Return if symptoms worsen or fail to improve.      Your next 10 appointments already scheduled     Jun 11, 2018  1:30 PM CDT   DX HIP/PELVIS/SPINE with OXDX1   [facility] ([facility])    [address] [address] [phone]           Please do not take any of the following 24 hours prior to the day of your exam: vitamins, calcium tablets, antacids.  If possible, please wear clothes without metal (snaps, zippers). A sweatsuit works well.            Jun 12, 2018  9:30 AM CDT   Return Sleep Patient with Guanaco Kowalski MD   [facility] ([facility])    [address]  [facility] [id number] [phone]            Jun 26, 2018 10:45 AM CDT   Return Visit with Bj Anderson MD   [facility] ([facility])    [address]  [facility] [address] [phone] OPT 2            Jul 26, 2018 10:15 AM CDT   RETURN GLAUCOMA with Marely Robin MD   [facility] ([facility])    [address]  [facility] [address]   [phone]            Aug 13, 2018  9:00 AM CDT   (Arrive by 8:45 AM)   Return Visit with Alina Jennings MD   [facility] ([facility]    [address]  [address] [place] [address] [phone]              Future tests that were ordered for you today     Open Future Orders        Priority Expected Expires Ordered    **Hepatitis C Screen Reflex to RNA FUTURE anytime Routine 6/6/2018 6/6/2019 6/6/2018    DX Hip/Pelvis/Spine Routine  6/6/2019 6/6/2018            Who to contact     If you have questions or need follow up information about today's clinic visit or your schedule please contact [facility] directly at [phone].  Normal or non-critical lab and imaging results will be communicated to you by MyChart, letter or phone within 4 business days after the clinic has received the results. If you do not hear from us within 7 days, please contact the clinic through MyChart or phone. If you have a critical or abnormal lab result, we will notify you by phone as soon as possible.  Submit refill requests through [employer] or call your pharmacy and they will forward the refill request to us. Please allow 3 business days for your refill to be completed.          Additional Information About Your Visit        Care EveryWhere ID     This is your Care EveryWhere ID. This could be used by other organizations to access your [place] medical records  [id number]        Your Vitals Were     Pulse Temperature Respirations Pulse Oximetry BMI (Body Mass Index)       76 98.1  F (36.7  C) (Oral) 16 98% 52.47 kg/m2        Blood Pressure from Last 3 Encounters:   06/06/18 112/64   05/31/18 115/79   05/24/18 134/80    Weight from Last 3 Encounters:   06/06/18 138 lb (62.6 kg)   05/17/18 138 lb (62.6 kg)   05/09/18 138 lb (62.6 kg)                 Today's Medication Changes          These changes are accurate as of 6/6/18 11:41 AM.  If you have any questions, ask your nurse or doctor.               These medicines have changed or have updated prescriptions.        Dose/Directions    Phenytoin Sodium  Extended 200 MG Caps   This may have changed:  additional instructions   Used for:  Seizure disorder (H)        Dose:  200 mg   Take 200 mg by mouth 2 times daily   Quantity:  60 capsule   Refills:  0            Where to get your medicines      These medications were sent to [facility] [address]  [address] [address]     Phone:  [phone]     diclofenac 1 % Gel topical gel         Some of these will need a paper prescription and others can be bought over the counter.  Ask your nurse if you have questions.     Bring a paper prescription for each of these medications     oxyCODONE IR 5 MG tablet               Information about OPIOIDS     PRESCRIPTION OPIOIDS: WHAT YOU NEED TO KNOW   You have a prescription for an opioid (narcotic) pain medicine. Opioids can cause addiction. If you have a history of chemical dependency of any type, you are at a higher risk of becoming addicted to opioids. Only take this medicine after all other options have been tried. Take it for as short a time and as few doses as possible.     Do not:    Drive. If you drive while taking these medicines, you could be arrested for driving under the influence (DUI).    Operate heavy machinery    Do any other dangerous activities while taking these medicines.     Drink any alcohol while taking these medicines.      Take with any other medicines that contain acetaminophen. Read all labels carefully. Look for the word  acetaminophen  or  Tylenol.  Ask your pharmacist if you have questions or are unsure.    Store your pills in a secure place, locked if possible. We will not replace any lost or stolen medicine. If you don t finish your medicine, please throw away (dispose) as directed by your pharmacist. The [place] Pollution Control Agency has more information about safe disposal: https://www.pca.[facility].mn.us/living-green/managing-unwanted-medications    All opioids tend to cause  constipation. Drink plenty of water and eat foods that have a lot of fiber, such as fruits, vegetables, prune juice, apple juice and high-fiber cereal. Take a laxative (Miralax, milk of magnesia, Colace, Senna) if you don t move your bowels at least every other day.          Primary Care Provider Office Phone # Fax #    Allan Casey [phone]       [address] [facility] [address]        Equal Access to Services     KARI CARREON : Hadii aad ku hadasho Soomaali, waaxda luqadaha, qaybta kaalmada adeegyada, waxay idiin hayaan talisha kharalizandro marvin . So [facility] [phone].    ATENCIÓN: Si habla español, tiene a briones disposición servicios gratuitos de asistencia lingüística. LlK[facility]tering Health [facility]ble [phone].    We comply with applicable federal civil rights laws and [place] laws. We do not discriminate on the basis of race, color, national origin, age, disability, sex, sexual orientation, or gender identity.            Thank you!     Thank you for choosing [facility]  for your care. Our goal is always to provide you with excellent care. Hearing back from our patients is one way we can continue to improve our services. Please take a few minutes to complete the written survey that you may receive in the mail after your visit with us. Thank you!             Your Updated Medication List - Protect others around you: Learn how to safely use, store and throw away your medicines at www.disposemymeds.org.          This list is accurate as of 6/6/18 11:41 AM.  Always use your most recent med list.                   Brand Name Dispense Instructions for use Diagnosis    ACETAMINOPHEN PO      Take 1,000 mg by mouth every 4 hours as needed for pain Not to exceed 4000 mg/day        ADVAIR DISKUS 250-50 MCG/DOSE diskus inhaler   Generic drug:  fluticasone-salmeterol     60 Inhaler    Inhale 1 puff into the lungs 2 times daily    Acute bronchitis       * albuterol (2.5 MG/3ML) 0.083% neb  solution     360 mL    INHALE 1 VIAL VIA NEBULIZER EVERY 6 HOURS AS NEEDED    Chronic obstructive pulmonary disease, unspecified COPD type (H)       * VENTOLIN  (90 Base) MCG/ACT Inhaler   Generic drug:  albuterol     3 Inhaler    Inhale 2 puffs into the lungs every 6 hours as needed for shortness of breath / dyspnea or wheezing    Chronic obstructive pulmonary disease, unspecified COPD type (H)       aspirin 81 MG EC tablet     90 tablet    Take 1 tablet (81 mg) by mouth daily    Unstable angina (H)       atorvastatin 40 MG tablet    LIPITOR    90 tablet    Take 1 tablet (40 mg) by mouth daily    Hyperlipidemia LDL goal <100       blood glucose monitoring lancets     100 each    Use to test blood sugar 3 times daily or as directed.    Type 2 diabetes mellitus with diabetic peripheral angiopathy without gangrene, without long-term current use of insulin (H)       blood glucose monitoring meter device kit     1 kit    Use to test blood sugars 3 times daily or as directed.    Type 2 diabetes, HbA1C goal < 8% (H)       blood glucose monitoring test strip    ONETOUCH ULTRA    3 Box    Use to test blood sugars 3 times daily or as directed.    Type 2 diabetes mellitus with diabetic peripheral angiopathy without gangrene, without long-term current use of insulin (H)       brimonidine 0.2 % ophthalmic solution    ALPHAGAN    1 Bottle    Place 1 drop into the right eye 2 times daily    Primary open angle glaucoma of both eyes, severe stage       calcium citrate-vitamin D 315-250 MG-UNIT Tabs per tablet    CITRACAL    120 tablet    Take 2 tablets by mouth daily    Osteoporosis       cetirizine 10 MG tablet    zyrTEC    90 tablet    Take 1 tablet (10 mg) by mouth daily as needed for allergies    Seasonal allergic rhinitis, unspecified allergic rhinitis trigger       CYANOCOBALAMIN PO      Take 2,000 mcg by mouth daily        diclofenac 1 % Gel topical gel    VOLTAREN    100 g    Apply 2 g topically 4 times daily to  hands    Primary osteoarthritis of both hands       dorzolamide 2 % ophthalmic solution    TRUSOPT    1 Bottle    Place 1 drop into the right eye 2 times daily    Primary open angle glaucoma of both eyes, severe stage       EPINEPHrine 0.3 MG/0.3ML injection 2-pack    EPIPEN/ADRENACLICK/or ANY BX GENERIC EQUIV    0.6 mL    Inject 0.3 mLs (0.3 mg) into the muscle as needed for anaphylaxis    Bee sting reaction, undetermined intent, subsequent encounter       escitalopram 10 MG tablet    LEXAPRO     Take 10 mg by mouth daily        ESTRACE VAGINAL 0.1 MG/GM cream   Generic drug:  estradiol     42.5 g    USE DIME SIZE AMOUNT 3X A WEEK AT NIGHT    Atrophic vaginitis       estradiol 1 MG tablet    ESTRACE    90 tablet    Take 1 tablet (1 mg) by mouth daily    Person who has had sex change operation       ferrous sulfate 325 (65 Fe) MG tablet    IRON    60 tablet    Take 1 tablet (325 mg) by mouth 2 times daily With meals        fluticasone 50 MCG/ACT spray    FLONASE     Spray 1 spray into both nostrils daily        guaiFENesin-codeine 100-10 MG/5ML Soln solution    ROBITUSSIN AC    120 mL    Take 5 mLs by mouth every 4 hours as needed for cough    Upper respiratory tract infection, unspecified type       hydrochlorothiazide 12.5 MG capsule    MICROZIDE    90 capsule    Take 1 capsule (12.5 mg) by mouth daily    Essential hypertension with goal blood pressure less than 130/80       INCRUSE ELLIPTA 62.5 MCG/INH oral inhaler   Generic drug:  umeclidinium      Inhale 1 puff into the lungs daily        lactulose 10 GM/15ML solution    CHRONULAC     Take 20 g by mouth as needed for constipation        latanoprost 0.005 % ophthalmic solution    XALATAN    2.5 mL    Place 1 drop into both eyes At Bedtime    Primary open angle glaucoma of both eyes, severe stage       levETIRAcetam 500 MG tablet    KEPPRA    180 tablet    Take 1 tablet (500 mg) by mouth 2 times daily    Nausea       lidocaine 5 % Patch    LIDODERM    30 patch     APPLY 1 TO 3 PATCHES TO PAINFUL AREA AT ONCE FOR UP TO 12 HOURS WITHIN A 24 HOUR PERIOD REMOVE AFTER 12 HOURS    Chronic pain syndrome, 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 (H)       lisinopril 20 MG tablet    PRINIVIL/ZESTRIL    90 tablet    Take 1 tablet (20 mg) by mouth daily    History of coronary artery disease       MEDICATION GIVEN BY INTRATHECAL PUMP - INSTRUCTION      continuous 2/8/18: Pump managed by [facility] in [place] [phone].  Conc: Bupivacaine 20 mg/mL and Fentanyl 2000 mcg/mL, morphine 2 mg/mL Continuous:  Fentanyl 796 mcg/day, Bupivacaine 7.96 mg/day, Morphine 0.796 mg/day  Boluses: Up to 7 boluses per 24-hr period of Bupivicaine 0.5994 mg and Fentanyl 59.9 mcg morphine 0.0599 mg. Pump Refill Date 02/28/18        metFORMIN 500 MG 24 hr tablet    GLUCOPHAGE-XR    90 tablet    Take 1 tablet (500 mg) by mouth daily (with dinner)    Type 2 diabetes mellitus with diabetic peripheral angiopathy and gangrene, without long-term current use of insulin (H)       metoprolol succinate 50 MG 24 hr tablet    TOPROL-XL    90 tablet    Take 1 tablet (50 mg) by mouth daily    History of coronary artery disease       MULTIVITAMIN PO      Take 1 tablet by mouth daily        nicotine 14 MG/24HR 24 hr patch    NICODERM CQ    30 patch    Place 1 patch onto the skin daily    Tobacco dependence syndrome       nitroGLYcerin 0.4 MG sublingual tablet    NITROSTAT    25 tablet    Place 1 tablet (0.4 mg) under the tongue every 5 minutes as needed for chest pain if you are still having symptoms after 3 doses (15 minutes) call 911.    Chronic systolic congestive heart failure (H), Old myocardial infarction       ondansetron 8 MG ODT tab    ZOFRAN-ODT    30 tablet    Take 1 tablet (8 mg) by mouth every 8 hours as needed    Other migraine without status migrainosus, not intractable       order for DME     1 Month    Equipment being ordered: Depends briefs    Incontinence        order for DME     1 Device    Equipment being ordered: Power Wheelchair    CVA (cerebral infarction), HTN (hypertension)       order for DME     1 Box    Equipment being ordered: Glucerna daily shakes with each meal    Type 2 diabetes mellitus with other diabetic neurological complication       * order for DME     1 Units    Equipment being ordered: Nebulizer and tubing supplies    Simple chronic bronchitis (H)       * order for DME     1 Device    Equipment being ordered: CPAP supplies mask, hose, filters, cushion fax to [facility] at [phone] Disp: 10 DeviceRefills: prn Class: Local PrintStart: 2/10/2017    Chronic obstructive pulmonary disease, unspecified COPD type (H)       * order for DME     10 Device    Equipment being ordered: CPAP supplies mask, hose, filters, cushion  fax to [facility] at [phone]    COPD (chronic obstructive pulmonary disease) (H)       * order for DME     1 Device    Hospital bed with side rails    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 (H)       * order for DME     1 Device    Full electric hospital bed with half rails  Dx: D84323, I110, J449 Length of need: lifetime    Status post bilateral above knee amputation (H)       * order for DME     1 Device    Wheel Chair Cushion: 18 x 18 inch Roho cushion    Status post bilateral above knee amputation (H)       order for DME     1 Package    Equipment being ordered: bandage tape, prefer paper    Burn of skin       oxyCODONE IR 5 MG tablet    ROXICODONE    5 tablet    Take 1 tablet (5 mg) by mouth every 6 hours as needed for pain    Bladder spasm       pantoprazole 40 MG EC tablet    PROTONIX    30 tablet    Take 1 tablet (40 mg) by mouth daily    Gastroesophageal reflux disease without esophagitis       Phenytoin Sodium Extended 200 MG Caps     60 capsule    Take 200 mg by mouth 2 times daily    Seizure disorder (H)       polyethylene glycol Packet    MIRALAX/GLYCOLAX     Take 17 g by mouth daily  Dissolved in water or juice        pregabalin 75 MG capsule    LYRICA    120 capsule    Take 2 capsules (150 mg) by mouth 2 times daily    Pain in both upper extremities       PROCHLORPERAZINE MALEATE PO      Take 10 mg by mouth every 8 hours as needed for nausea or vomiting        risperiDONE 0.5 MG tablet    risperDAL     Take 0.5 mg by mouth At Bedtime        silver sulfADIAZINE 1 % cream    SILVADENE    50 g    Apply topically 2 times daily    Burn       sucralfate 1 GM tablet    CARAFATE    120 tablet    Take 1 tablet (1 g) by mouth 4 times daily May dissolve in 10 mL water is necessary. (Start upon completion of carafate suspension)    Adjustment disorder with depressed mood       tetracycline 500 MG capsule    ACHROMYCIN/SUMYCIN    28 capsule    Take 1 capsule (500 mg) by mouth 2 times daily    Dysuria       traZODone 50 MG tablet    DESYREL     Take 150 mg by mouth At Bedtime        vitamin D 2000 units tablet     100 tablet    Take 2,000 Units by mouth daily.        zinc 50 MG Tabs      Take 1 tablet by mouth daily        * Notice:  This list has 8 medication(s) that are the same as other medications prescribed for you. Read the directions carefully, and ask your doctor or other care provider to review them with you.

## 2018-06-06 NOTE — PROGRESS NOTES
SUBJECTIVE:   Sonya Foote is a 68 year old female who presents to clinic today for the following health issues:    ED/UC Followup:    Facility:  [facility] ER  Date of visit: 5/31/18  Reason for visit: Bladder Spasms   Current Status: Patient states bladder spasms still occurring while passing bowels and sleeping      Assessments & Plan (with Medical Decision Making)   Ms. Foote is a 68 year old female with a history of schizoaffective disorder, type 2 DM, CAD, ischemic cardiomyopathy, and prior CVA who presents with dysuria.  She reports pain primarily along the suprapubic catheter site.  The catheter is draining without any difficulty and has no evidence of hematuria.  UA is not suggestive of UTI.  She has no leukocytosis and electrolytes as well as creatinine are within normal limits.  She did go for a CT of the abdomen to rule out other intra-abdominal pathology which is negative, catheter in appropriate position.  She was given B&O suppository for bladder spasm and on re-evaluation is feeling significantly improved.  Suspect that her pain is due to bladder spasms.  Patient will be discharged home with instructions to follow-up with her primary care provider for reevaluation of long-term management of bladder spasms.  She was given close return instructions for the emergency department and voiced understanding.    Other concerns:  1. Requesting orders/ note for diabetic diet -letter done  2. Patient states she was unable to use Nicoderm patch because it caused a rash discussed options.  3. Hot flashes/ excessive sweating- patient would like to discuss hormone levels and discussed and suggested she see gynecology for further assessment    Problem list and histories reviewed & adjusted, as indicated.  Additional history: as documented    Patient Active Problem List   Diagnosis     Hyperlipidemia LDL goal <100     Seizure disorder (H)     ACP (advance care planning)     Osteoporosis     Schizoaffective disorder (H)      AS (sickle cell trait) (H)     Vertigo     Person who has had sex change operation     Claudication in peripheral vascular disease (H)     Intestinal malabsorption     GIB (gastrointestinal bleeding)     Cervicalgia     Health Care Home     Asthma     Adjustment disorder with depressed mood     Chronic pain syndrome     Open-angle glaucoma     Hx of colonic polyp     Old myocardial infarction     Iron deficiency anemia     Late effect of stroke     Degeneration of intervertebral disc of lumbosacral region     Thoracic or lumbosacral neuritis or radiculitis     Cerebral infarction due to occlusion or stenosis of carotid artery     Disorder of bone and cartilage     Hereditary and idiopathic peripheral neuropathy     Androgen insensitivity syndrome     PAD (peripheral artery disease) (H)     Chronic systolic congestive heart failure (H)     Stenosis of carotid artery     Osteoarthritis     Pain in both upper extremities     Atherosclerotic peripheral vascular disease with rest pain (H)     Essential hypertension     Cellulitis of right ankle     Angina pectoris, crescendo (H)     Type 2 diabetes mellitus with diabetic peripheral angiopathy without gangrene, without long-term current use of insulin (H)     Anemia, unspecified type     Critical lower limb ischemia     Testicular feminization     Anxiety disorder due to general medical condition     Central retinal artery occlusion     Lumbosacral radiculitis     Peripheral neuropathy     Osteopenia     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 (H)     Primary open angle glaucoma of both eyes, severe stage     Pseudophakia of right eye     Cataract, left eye     Diabetes mellitus type 2 without retinopathy (H)     Pyelonephritis     Chronic obstructive pulmonary disease, unspecified COPD type (H)     JOSE (obstructive sleep apnea)     Complex sleep apnea syndrome     Coronary artery disease of native artery of native heart with stable angina pectoris  (H)     Hyperlipidemia     Ischemic cardiomyopathy     Bee sting reaction, undetermined intent, subsequent encounter     Functional incontinence     Personal history of urinary tract infection     Dysphagia     Single seizure (H)     Essential hypertension with goal blood pressure less than 130/80     Hyperlipidemia LDL goal <70     UTI (urinary tract infection)     Food impaction of esophagus     Esophageal foreign body     Nausea & vomiting     Incontinence     Black tarry stools     Other migraine without status migrainosus, not intractable     Morbid obesity (H)     Past Surgical History:   Procedure Laterality Date     AMPUTATE LEG ABOVE KNEE Left 6/11/2016    Procedure: AMPUTATE LEG ABOVE KNEE;  Surgeon: Mello Rodriguez MD;  [facility]     AMPUTATE LEG BELOW KNEE Right 11/7/2016    Procedure: AMPUTATE LEG BELOW KNEE;  Surgeon: Savannah Durant MD;  [facility]     AMPUTATE REVISION STUMP LOWER EXTREMITY Right 11/11/2016    Procedure: AMPUTATE REVISION STUMP LOWER EXTREMITY;  Surgeon: Savannah Durant MD;  [facility]     AMPUTATE REVISION STUMP LOWER EXTREMITY Right 11/16/2016    Procedure: AMPUTATE REVISION STUMP LOWER EXTREMITY;  Surgeon: Savannah Durant MD;  [facility]     AMPUTATE TOE(S) Right 1/5/2016    Procedure: AMPUTATE TOE(S);  Surgeon: Mello Gaines DPM;  Location: [facility]     ANGIOGRAM Bilateral 11/21/2014    Procedure: ANGIOGRAM;  Surgeon: Savannah Durant MD;  [facility]     ANGIOGRAM Left 1/16/2015    Procedure: ANGIOGRAM;  Surgeon: Savannah Durant MD;  [facility]     ANGIOGRAM Bilateral 9/14/2015    Procedure: ANGIOGRAM;  Surgeon: Savannah Durant MD;  [facility]     ANGIOGRAM Left 10/12/2015    Procedure: ANGIOGRAM;  Surgeon: Savannah Durant MD;  [facility]     ANGIOGRAM Right 6/6/2016    Procedure: ANGIOGRAM;  Surgeon: Savannah Durant MD;  [facility]     ANGIOPLASTY Right 6/6/2016    Procedure: ANGIOPLASTY;  Surgeon: Savannah Durant MD;  [facility]      APPENDECTOMY       BREAST SURGERY      right breast bx (benign)     CATARACT IOL, RT/LT Right      CHOLECYSTECTOMY       COLONOSCOPY N/A 8/25/2014    Procedure: COLONOSCOPY;  Surgeon: Mello Ferrer MD;  [facility]     COLONOSCOPY WITH CO2 INSUFFLATION N/A 8/20/2014    Procedure: COLONOSCOPY WITH CO2 INSUFFLATION;  Surgeon: Duane, William Charles, MD;  [facility]     CYSTOSTOMY, INSERT TUBE SUPRAPUBIC, COMBINED N/A 1/16/2018    Procedure: COMBINED CYSTOSTOMY, INSERT TUBE SUPRAPUBIC;  Cystoscopy, Intraoperative Ultrasound, Suprapubic Tube Placement;  Surgeon: Keanu Dawson MD;  [facility]     ENDARTERECTOMY FEMORAL  5/23/2014    Procedure: ENDARTERECTOMY FEMORAL;  Surgeon: Jason Joshi MD;  [facility]     ESOPHAGOSCOPY, GASTROSCOPY, DUODENOSCOPY (EGD), COMBINED  12/14/2012    Procedure: COMBINED ESOPHAGOSCOPY, GASTROSCOPY, DUODENOSCOPY (EGD), BIOPSY SINGLE OR MULTIPLE;  ESOPHAGOSCOPY, GASTROSCOPY, DUODENOSCOPY (EGD), DILATATION ;  Surgeon: Elizabeth Stevenson MD;  Location:  GI     ESOPHAGOSCOPY, GASTROSCOPY, DUODENOSCOPY (EGD), COMBINED  12/31/2013    Procedure: COMBINED ESOPHAGOSCOPY, GASTROSCOPY, DUODENOSCOPY (EGD), BIOPSY SINGLE OR MULTIPLE;;  Surgeon: Clemente Lopez MD;  [facility]     ESOPHAGOSCOPY, GASTROSCOPY, DUODENOSCOPY (EGD), COMBINED  4/1/2014    Procedure: COMBINED ESOPHAGOSCOPY, GASTROSCOPY, DUODENOSCOPY (EGD);;  Surgeon: Clemente Lopez MD;  [facility]     ESOPHAGOSCOPY, GASTROSCOPY, DUODENOSCOPY (EGD), COMBINED  6/28/2014    Procedure: COMBINED ESOPHAGOSCOPY, GASTROSCOPY, DUODENOSCOPY (EGD);  Surgeon: Clemente Lopez MD;  [facility]     ESOPHAGOSCOPY, GASTROSCOPY, DUODENOSCOPY (EGD), COMBINED N/A 8/20/2014    Procedure: COMBINED ESOPHAGOSCOPY, GASTROSCOPY, DUODENOSCOPY (EGD), BIOPSY SINGLE OR MULTIPLE;  Surgeon: Duane, William Charles, MD;  [facility]     ESOPHAGOSCOPY, GASTROSCOPY, DUODENOSCOPY (EGD), COMBINED N/A 8/22/2014     Procedure: COMBINED ESOPHAGOSCOPY, GASTROSCOPY, DUODENOSCOPY (EGD), BIOPSY SINGLE OR MULTIPLE;  Surgeon: Mello Ferrer MD;  [facility]     ESOPHAGOSCOPY, GASTROSCOPY, DUODENOSCOPY (EGD), COMBINED N/A 10/2/2014    Procedure: COMBINED ESOPHAGOSCOPY, GASTROSCOPY, DUODENOSCOPY (EGD), BIOPSY SINGLE OR MULTIPLE;  Surgeon: Remy Haskins MD;  [facility]     ESOPHAGOSCOPY, GASTROSCOPY, DUODENOSCOPY (EGD), COMBINED Left 12/15/2014    Procedure: COMBINED ESOPHAGOSCOPY, GASTROSCOPY, DUODENOSCOPY (EGD), BIOPSY SINGLE OR MULTIPLE;  Surgeon: Remy Haskins MD;  [facility]     ESOPHAGOSCOPY, GASTROSCOPY, DUODENOSCOPY (EGD), COMBINED N/A 2/25/2015    Procedure: COMBINED ENDOSCOPIC ULTRASOUND, ESOPHAGOSCOPY, GASTROSCOPY, DUODENOSCOPY (EGD), FINE NEEDLE ASPIRATE/BIOPSY;  Surgeon: Clemente Lugo MD;  [facility]     ESOPHAGOSCOPY, GASTROSCOPY, DUODENOSCOPY (EGD), COMBINED Left 2/25/2015    Procedure: COMBINED ESOPHAGOSCOPY, GASTROSCOPY, DUODENOSCOPY (EGD), BIOPSY SINGLE OR MULTIPLE;  Surgeon: Clemente Lugo MD;  [facility]     ESOPHAGOSCOPY, GASTROSCOPY, DUODENOSCOPY (EGD), COMBINED N/A 9/25/2016    Procedure: COMBINED ESOPHAGOSCOPY, GASTROSCOPY, DUODENOSCOPY (EGD);  Surgeon: Aziza Patiño MD;  [facility]     ESOPHAGOSCOPY, GASTROSCOPY, DUODENOSCOPY (EGD), COMBINED N/A 1/18/2017    Procedure: COMBINED ESOPHAGOSCOPY, GASTROSCOPY, DUODENOSCOPY (EGD), BIOPSY SINGLE OR MULTIPLE;  Surgeon: Clemente Lopez MD;  [facility]     ESOPHAGOSCOPY, GASTROSCOPY, DUODENOSCOPY (EGD), COMBINED N/A 11/26/2017    Procedure: COMBINED ESOPHAGOSCOPY, GASTROSCOPY, DUODENOSCOPY (EGD), REMOVE FOREIGN BODY;  Esophagogastroduodenoscopy with foreign body extraction  ;  Surgeon: Herberth Castrejon MD;  [facility]     ESOPHAGOSCOPY, GASTROSCOPY, DUODENOSCOPY (EGD), COMBINED N/A 11/26/2017    Procedure: COMBINED ESOPHAGOSCOPY, GASTROSCOPY, DUODENOSCOPY (EGD), REMOVE FOREIGN BODY;;  Surgeon:  Herberth Castrejon MD;  [facility]     FASCIOTOMY LOWER EXTREMITY Left 6/10/2016    Procedure: FASCIOTOMY LOWER EXTREMITY;  Surgeon: Mello Rodriguez MD;  [facility]     HC CAPSULE ENDOSCOPY N/A 8/25/2014    Procedure: CAPSULE/PILL CAM ENDOSCOPY;  Surgeon: Remy Haskins MD;  [facility]     HC CAPSULE ENDOSCOPY N/A 10/2/2014    Procedure: CAPSULE/PILL CAM ENDOSCOPY;  Surgeon: Remy Haskins MD;  [facility]     ORTHOPEDIC SURGERY      broken wrist repair     SEX TRANSFORMATION SURGERY, MALE TO FEMALE      1974     SINUS SURGERY      cyst removed     TONSILLECTOMY       VASCULAR SURGERY      Left carotid stent       Social History   Substance Use Topics     Smoking status: Current Every Day Smoker     Packs/day: 0.25     Years: 30.00     Types: Cigarettes, Cigars     Last attempt to quit: 11/1/2001     Smokeless tobacco: Never Used     Alcohol use No     Family History   Problem Relation Age of Onset     Dementia Mother      Glaucoma Mother      DIABETES Mother      may have been type 1, childhood     Coronary Artery Disease Mother      MI     Glaucoma Father      DIABETES Father      may hev been type 1 - ??     Heart Failure Father      CANCER Maternal Aunt      leukemia     Schizophrenia Brother      Depression Brother      Suicide Sister      Depression Sister      DIABETES Sister      CANCER Maternal Aunt      ovarian     Glaucoma Maternal Grandmother      DIABETES Maternal Grandmother      Glaucoma Maternal Grandfather      DIABETES Maternal Grandfather      Glaucoma Paternal Grandmother      DIABETES Paternal Grandmother      Glaucoma Paternal Grandfather      DIABETES Paternal Grandfather      Breast Cancer Sister      CEREBROVASCULAR DISEASE Brother      Colon Cancer No family hx of      Macular Degeneration No family hx of          Current Outpatient Prescriptions   Medication Sig Dispense Refill     ACETAMINOPHEN PO Take 1,000 mg by mouth every 4 hours as needed for  pain Not to exceed 4000 mg/day       ADVAIR DISKUS 250-50 MCG/DOSE diskus inhaler Inhale 1 puff into the lungs 2 times daily 60 Inhaler 11     albuterol (2.5 MG/3ML) 0.083% neb solution INHALE 1 VIAL VIA NEBULIZER EVERY 6 HOURS AS NEEDED 360 mL 11     aspirin EC 81 MG EC tablet Take 1 tablet (81 mg) by mouth daily 90 tablet 3     atorvastatin (LIPITOR) 40 MG tablet Take 1 tablet (40 mg) by mouth daily 90 tablet 2     blood glucose monitoring (ONE TOUCH ULTRA 2) meter device kit Use to test blood sugars 3 times daily or as directed. 1 kit 0     blood glucose monitoring (ONE TOUCH ULTRA) test strip Use to test blood sugars 3 times daily or as directed. 3 Box 3     blood glucose monitoring (ONE TOUCH ULTRASOFT) lancets Use to test blood sugar 3 times daily or as directed. 100 each PRN     brimonidine (ALPHAGAN) 0.2 % ophthalmic solution Place 1 drop into the right eye 2 times daily 1 Bottle 11     calcium citrate-vitamin D (CITRACAL) 315-250 MG-UNIT TABS Take 2 tablets by mouth daily 120 tablet 5     cetirizine (ZYRTEC) 10 MG tablet Take 1 tablet (10 mg) by mouth daily as needed for allergies 90 tablet 3     Cholecalciferol (VITAMIN D) 2000 UNITS tablet Take 2,000 Units by mouth daily. 100 tablet 3     CYANOCOBALAMIN PO Take 2,000 mcg by mouth daily       diclofenac (VOLTAREN) 1 % GEL topical gel Apply 2 g topically 4 times daily to hands 100 g 11     dorzolamide (TRUSOPT) 2 % ophthalmic solution Place 1 drop into the right eye 2 times daily 1 Bottle 11     EPINEPHrine (EPIPEN/ADRENACLICK/OR ANY BX GENERIC EQUIV) 0.3 MG/0.3ML injection 2-pack Inject 0.3 mLs (0.3 mg) into the muscle as needed for anaphylaxis 0.6 mL 1     escitalopram (LEXAPRO) 10 MG tablet Take 10 mg by mouth daily        ESTRACE VAGINAL 0.1 MG/GM cream USE DIME SIZE AMOUNT 3X A WEEK AT NIGHT 42.5 g 11     estradiol (ESTRACE) 1 MG tablet Take 1 tablet (1 mg) by mouth daily 90 tablet 3     ferrous sulfate (IRON) 325 (65 FE) MG tablet Take 1 tablet (325  mg) by mouth 2 times daily With meals 60 tablet 2     fluticasone (FLONASE) 50 MCG/ACT nasal spray Spray 1 spray into both nostrils daily       guaiFENesin-codeine (ROBITUSSIN AC) 100-10 MG/5ML SOLN solution Take 5 mLs by mouth every 4 hours as needed for cough 120 mL 0     hydrochlorothiazide (MICROZIDE) 12.5 MG capsule Take 1 capsule (12.5 mg) by mouth daily 90 capsule 2     lactulose (CHRONULAC) 10 GM/15ML solution Take 20 g by mouth as needed for constipation       latanoprost (XALATAN) 0.005 % ophthalmic solution Place 1 drop into both eyes At Bedtime 2.5 mL 11     levETIRAcetam (KEPPRA) 500 MG tablet Take 1 tablet (500 mg) by mouth 2 times daily 180 tablet 1     lidocaine (LIDODERM) 5 % Patch APPLY 1 TO 3 PATCHES TO PAINFUL AREA AT ONCE FOR UP TO 12 HOURS WITHIN A 24 HOUR PERIOD REMOVE AFTER 12 HOURS 30 patch 5     lisinopril (PRINIVIL/ZESTRIL) 20 MG tablet Take 1 tablet (20 mg) by mouth daily 90 tablet 0     MEDICATION GIVEN BY INTRATHECAL PUMP - INSTRUCTION continuous 2/8/18: Pump managed by [facility] in [place] [phone].   Conc: Bupivacaine 20 mg/mL and Fentanyl 2000 mcg/mL, morphine 2 mg/mL  Continuous:  Fentanyl 796 mcg/day, Bupivacaine 7.96 mg/day, Morphine 0.796 mg/day   Boluses: Up to 7 boluses per 24-hr period of Bupivicaine 0.5994 mg and Fentanyl 59.9 mcg morphine 0.0599 mg.  Pump Refill Date 02/28/18       metFORMIN (GLUCOPHAGE-XR) 500 MG 24 hr tablet Take 1 tablet (500 mg) by mouth daily (with dinner) 90 tablet 3     metoprolol succinate (TOPROL-XL) 50 MG 24 hr tablet Take 1 tablet (50 mg) by mouth daily 90 tablet 0     Multiple Vitamin (MULTIVITAMIN OR) Take 1 tablet by mouth daily        nicotine (NICODERM CQ) 14 MG/24HR 24 hr patch Place 1 patch onto the skin daily 30 patch 0     nitroglycerin (NITROSTAT) 0.4 MG SL tablet Place 1 tablet (0.4 mg) under the tongue every 5 minutes as needed for chest pain if you are still having symptoms after 3 doses (15 minutes)  call 911. 25 tablet 1     ondansetron (ZOFRAN-ODT) 8 MG ODT tab Take 1 tablet (8 mg) by mouth every 8 hours as needed 30 tablet 5     order for DME Equipment being ordered: bandage tape, prefer paper 1 Package 0     order for DME Full electric hospital bed with half rails    Dx: A42580, I110, J449  Length of need: lifetime 1 Device 0     order for DME Wheel Chair Cushion: 18 x 18 inch Roho cushion 1 Device 0     order for DME Hospital bed with side rails 1 Device 0     order for DME Equipment being ordered: CPAP supplies mask, hose, filters, cushion    fax to [facility] at [phone] 10 Device prn     order for DME Equipment being ordered: CPAP supplies mask, hose, filters, cushion fax to [facility] at [phone]  Disp: 10 Device Refills: prn   Class: Local Print Start: 2/10/2017 1 Device 0     order for DME Equipment being ordered: Nebulizer and tubing supplies 1 Units 0     order for DME Equipment being ordered: Glucerna daily shakes with each meal 1 Box 11     ORDER FOR DME Equipment being ordered: Power Wheelchair 1 Device 0     ORDER FOR DME Equipment being ordered: Depends briefs 1 Month 11     oxyCODONE IR (ROXICODONE) 5 MG tablet Take 1 tablet (5 mg) by mouth every 6 hours as needed for pain 5 tablet 0     pantoprazole (PROTONIX) 40 MG EC tablet Take 1 tablet (40 mg) by mouth daily 30 tablet 5     phenytoin 200 MG CAPS Take 200 mg by mouth 2 times daily (Patient taking differently: Take 200 mg by mouth 2 times daily (takes at 8 AM and 8 PM)) 60 capsule 0     polyethylene glycol (MIRALAX/GLYCOLAX) Packet Take 17 g by mouth daily Dissolved in water or juice        pregabalin (LYRICA) 75 MG capsule Take 2 capsules (150 mg) by mouth 2 times daily 120 capsule 5     PROCHLORPERAZINE MALEATE PO Take 10 mg by mouth every 8 hours as needed for nausea or vomiting       risperiDONE (RISPERDAL) 0.5 MG tablet Take 0.5 mg by mouth At Bedtime       silver sulfADIAZINE (SILVADENE) 1 % cream Apply topically 2  times daily 50 g 0     sucralfate (CARAFATE) 1 GM tablet Take 1 tablet (1 g) by mouth 4 times daily May dissolve in 10 mL water is necessary. (Start upon completion of carafate suspension) 120 tablet 11     tetracycline (ACHROMYCIN/SUMYCIN) 500 MG capsule Take 1 capsule (500 mg) by mouth 2 times daily 28 capsule 0     traZODone (DESYREL) 50 MG tablet Take 150 mg by mouth At Bedtime        umeclidinium (INCRUSE ELLIPTA) 62.5 MCG/INH oral inhaler Inhale 1 puff into the lungs daily       VENTOLIN  (90 BASE) MCG/ACT Inhaler Inhale 2 puffs into the lungs every 6 hours as needed for shortness of breath / dyspnea or wheezing 3 Inhaler 5     zinc 50 MG TABS Take 1 tablet by mouth daily       Allergies   Allergen Reactions     Bee Venom      Penicillins Anaphylaxis     Other reaction(s): Skin Rash and/or Hives     Dilantin [Phenytoin] Other (See Comments)     Generic dilantin only per pt     Iodide Hives     09/11/15: Pt states she can use iodine and doesn't have any problems      Iodine-131      Novocaine [Procaine] Hives     Other reaction(s): Skin Rash and/or Hives     Tositumomab      BP Readings from Last 3 Encounters:   05/31/18 115/79   05/24/18 134/80   05/17/18 100/63    Wt Readings from Last 3 Encounters:   05/17/18 138 lb (62.6 kg)   05/09/18 138 lb (62.6 kg)   05/02/18 131 lb 6.4 oz (59.6 kg)                    Reviewed and updated as needed this visit by clinical staff  Tobacco  Allergies  Meds  Med Hx  Surg Hx  Fam Hx  Soc Hx      Reviewed and updated as needed this visit by Provider         ROS:  CONSTITUTIONAL: NEGATIVE for fever, chills, change in weight  ENT/MOUTH: NEGATIVE for ear, mouth and throat problems  RESP: NEGATIVE for significant cough or SOB  CV: NEGATIVE for chest pain, palpitations or peripheral edema  GI: NEGATIVE for nausea, abdominal pain, mild heartburn, or change in bowel habits  NEURO: NEGATIVE for weakness, dizziness or paresthesias  HEME: NEGATIVE for bleeding  problems  PSYCHIATRIC: NEGATIVE for changes in mood or affect    OBJECTIVE:                                                    /64  Pulse 76  Temp 98.1  F (36.7  C) (Oral)  Resp 16  Wt 138 lb (62.6 kg)  SpO2 98%  BMI 52.47 kg/m2  Body mass index is 52.47 kg/(m^2).  GENERAL: alert and no distress in motorized wheelchair  RESP: lungs clear to auscultation - no rales, no rhonchi, no wheezes  CV: regular rates and rhythm, normal S1 S2, no S3 or S4 and no murmur, no click or rub -  ABDOMEN: soft, no tenderness, no  hepatosplenomegaly, no masses, normal bowel sounds  MS: Absent lower extremities due to bilateral AKA's  PSYCH: Alert and oriented times 3; speech- coherent , normal rate and volume; able to articulate logical thoughts, able to abstract reason, no tangential thoughts, no hallucinations or delusions, affect- normal     ASSESSMENT/PLAN:                                                    .  (N32.89) Bladder spasm  (primary encounter diagnosis)  Comment: I discussed with Sonya the issues of her recurrent bladder spasms in the setting of her catheter I suggested that she may benefit from seeing her urologist for better assessment.  I have refilled a limited supply of #5 for her pain  Plan: oxyCODONE IR (ROXICODONE) 5 MG tablet    (M19.041,  M19.042) Primary osteoarthritis of both hands  Comment: Continue with topical gels as ordered  Plan: diclofenac (VOLTAREN) 1 % GEL topical gel            (Z78.0) Asymptomatic postmenopausal status  Comment: She is complaining of hot sweats and night advised her that I do not feel that supplemental estrogen therapy is the best option for her at this point in would suggest that if this is something she is interested in that she may benefit from talking to a gynecologist.  Plan: DX Hip/Pelvis/Spine            (G40.909) Seizure disorder (H)  Comment: No evidence of recurrence continue with meds as  Plan:     (Z11.59) Need for hepatitis C screening test  Comment: I have  also placed future orders for hepatitis C screening per age  Plan: **Hepatitis C Screen Reflex to RNA FUTURE         anytime          See Patient Instructions, she informs me that she will be moving to [place] permanently and I wished her the best.  I have also written a short note for her to make sure that she is on an 1800-calorie restricted diabetic diet due to the place that she is at not honoring this unless letter is written from her physician    Allan Casey MD  [facility]    25 minutes spent with this patient, face to face, discussing treatment options for listed problems above as well as side effects of appropriate medications.  Counseling time extended beyond 50% of the clinic visit.  Medication dosing, treatment plan and follow-up were discussed. Also reviewed need for primary care testing for patient.

## 2018-06-06 NOTE — LETTER
[facility]  [address] [address]  [phone]        Sonya Foote  [address]  [place] [address]        To Whom it May Concern:    Sonya Foote needs to be on an adult 1800 calorie restricted diet due to her Diabetes and Hypertension.    Please contact me for questions or concerns.    Sincerely,       Allan Casey MD  [facility]        6/6/18

## 2018-06-06 NOTE — TELEPHONE ENCOUNTER
RX monitoring program ([place]) reviewed: last filled by Dr.Amy Amezquita,  #5 with no refills. (ED visit)    [place]:  [email or web address]

## 2018-06-07 ENCOUNTER — TELEPHONE (OUTPATIENT)
Dept: INTERNAL MEDICINE | Facility: CLINIC | Age: 68
End: 2018-06-07

## 2018-06-07 LAB
HCV AB SERPL QL IA: NONREACTIVE
PHENYTOIN FREE SERPL-MCNC: 0.86 UG/ML

## 2018-06-07 ASSESSMENT — ASTHMA QUESTIONNAIRES: ACT_TOTALSCORE: 6

## 2018-06-07 NOTE — TELEPHONE ENCOUNTER
They will have to do the diet that they are able to do then if they cannot follow the 1800 restricted diet

## 2018-06-07 NOTE — TELEPHONE ENCOUNTER
Called and informed the assisted living facility of message below.  Mary Foote CMA on 6/7/2018 at 1:08 PM

## 2018-06-07 NOTE — TELEPHONE ENCOUNTER
Reason for Call:  Other     Detailed comments: a nurse from the assisted living home that  lives at states that the orders for the restricted diet plan placed yesterday is not offered at their home so they are not able to do those orders but they do have her on a Low Concentrated sweets diet.     Phone Number Patient can be reached at: Other phone number:  [phone]    Best Time: any    Can we leave a detailed message on this number? YES    Call taken on 6/7/2018 at 11:53 AM by Rosangela Soto

## 2018-06-11 ENCOUNTER — RADIANT APPOINTMENT (OUTPATIENT)
Dept: BONE DENSITY | Facility: CLINIC | Age: 68
End: 2018-06-11
Attending: INTERNAL MEDICINE
Payer: COMMERCIAL

## 2018-06-11 DIAGNOSIS — Z78.0 ASYMPTOMATIC POSTMENOPAUSAL STATUS: ICD-10-CM

## 2018-06-11 PROCEDURE — 77085 DXA BONE DENSITY AXL VRT FX: CPT | Performed by: INTERNAL MEDICINE

## 2018-06-12 ENCOUNTER — OFFICE VISIT (OUTPATIENT)
Dept: SLEEP MEDICINE | Facility: CLINIC | Age: 68
End: 2018-06-12
Payer: COMMERCIAL

## 2018-06-12 VITALS
OXYGEN SATURATION: 100 % | DIASTOLIC BLOOD PRESSURE: 60 MMHG | WEIGHT: 138 LBS | HEART RATE: 77 BPM | SYSTOLIC BLOOD PRESSURE: 90 MMHG | BODY MASS INDEX: 52.47 KG/M2

## 2018-06-12 DIAGNOSIS — G47.33 OSA (OBSTRUCTIVE SLEEP APNEA): ICD-10-CM

## 2018-06-12 DIAGNOSIS — G47.39 COMPLEX SLEEP APNEA SYNDROME: ICD-10-CM

## 2018-06-12 PROCEDURE — 99213 OFFICE O/P EST LOW 20 MIN: CPT | Performed by: INTERNAL MEDICINE
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
## 2018-06-12 NOTE — NURSING NOTE
"Chief Complaint   Patient presents with     CPAP Follow Up       Initial BP 90/60  Pulse 77  Wt 62.6 kg (138 lb)  SpO2 100%  BMI 52.47 kg/m2 Estimated body mass index is 52.47 kg/(m^2) as calculated from the following:    Height as of 5/17/18: 1.092 m (3' 7\").    Weight as of this encounter: 62.6 kg (138 lb).    Medication Reconciliation: complete      "

## 2018-06-12 NOTE — MR AVS SNAPSHOT
After Visit Summary   6/12/2018    Sonya Foote    MRN: [id number]           Patient Information     Date Of Birth          1950        Visit Information        Provider Department      6/12/2018 9:30 AM Guanaco Kowalski MD [facility]        Today's Diagnoses     Complex sleep apnea syndrome        JOSE (obstructive sleep apnea)           Follow-ups after your visit        Your next 10 appointments already scheduled     Jun 26, 2018 10:45 AM CDT   Return Visit with Bj Anderson MD   [facility] ([facility])    [address] [place] [address] [address]  [facility] [address]   [phone] OPT 2            Jul 05, 2018  9:45 AM CDT   (Arrive by 9:30 AM)   Return Visit with Elizabeth Oliver MD   [facility] ([facility])    [address]  4th Floor  [facility] [address] [phone]            Jul 26, 2018 10:15 AM CDT   RETURN GLAUCOMA with Marely Robin MD   [facility] ([facility])    [address]  [facility] [address]   [phone]            Aug 13, 2018  9:00 AM CDT   (Arrive by 8:45 AM)   Return Visit with Alina Jennings MD   [facility] ([facility] [place])    [address]  [address]  [facility] [id number] [phone]              Who to contact     If you have questions or need follow up information about today's clinic visit or your schedule please contact [facility] directly at [phone].  Normal or non-critical lab and imaging results will be communicated to you by MyChart, letter or phone within 4 business days after the clinic has received the results. If you do not hear from us within 7 days, please contact the clinic through MyChart or phone. If you have a critical or  abnormal lab result, we will notify you by phone as soon as possible.  Submit refill requests through [employer] or call your pharmacy and they will forward the refill request to us. Please allow 3 business days for your refill to be completed.          Additional Information About Your Visit        Care EveryWhere ID     This is your Care EveryWhere ID. This could be used by other organizations to access your [facility] medical records  [id number]        Your Vitals Were     Pulse Pulse Oximetry BMI (Body Mass Index)             77 100% 52.47 kg/m2          Blood Pressure from Last 3 Encounters:   06/12/18 90/60   06/06/18 112/64   05/31/18 115/79    Weight from Last 3 Encounters:   06/12/18 62.6 kg (138 lb)   06/06/18 62.6 kg (138 lb)   05/17/18 62.6 kg (138 lb)              Today, you had the following     No orders found for display         Today's Medication Changes          These changes are accurate as of 6/12/18 10:05 AM.  If you have any questions, ask your nurse or doctor.               These medicines have changed or have updated prescriptions.        Dose/Directions    Phenytoin Sodium Extended 200 MG Caps   This may have changed:  additional instructions   Used for:  Seizure disorder (H)        Dose:  200 mg   Take 200 mg by mouth 2 times daily   Quantity:  60 capsule   Refills:  0                Primary Care Provider Office Phone # Fax #    Allan Casey [phone]       [address] [facility] [address]        Equal Access to Services     IRAJ CARREON : Hadii shaheen youo Sojoe, waaxda luqadaha, qaybta kaalmada adeliliayada, tonie marroquin. So [facility] [phone].    ATENCIÓN: Si habla español, tiene a briones disposición servicios gratuitos de asistencia lingüística. Meño al [phone].    We comply with applicable federal civil rights laws and [place] laws. We do not discriminate on the basis of race, color, national origin, age, disability, sex,  sexual orientation, or gender identity.            Thank you!     Thank you for choosing [facility]  for your care. Our goal is always to provide you with excellent care. Hearing back from our patients is one way we can continue to improve our services. Please take a few minutes to complete the written survey that you may receive in the mail after your visit with us. Thank you!             Your Updated Medication List - Protect others around you: Learn how to safely use, store and throw away your medicines at www.disposemymeds.org.          This list is accurate as of 6/12/18 10:05 AM.  Always use your most recent med list.                   Brand Name Dispense Instructions for use Diagnosis    ACETAMINOPHEN PO      Take 1,000 mg by mouth every 4 hours as needed for pain Not to exceed 4000 mg/day        ADVAIR DISKUS 250-50 MCG/DOSE diskus inhaler   Generic drug:  fluticasone-salmeterol     60 Inhaler    Inhale 1 puff into the lungs 2 times daily    Acute bronchitis       * albuterol (2.5 MG/3ML) 0.083% neb solution     360 mL    INHALE 1 VIAL VIA NEBULIZER EVERY 6 HOURS AS NEEDED    Chronic obstructive pulmonary disease, unspecified COPD type (H)       * VENTOLIN  (90 Base) MCG/ACT Inhaler   Generic drug:  albuterol     3 Inhaler    Inhale 2 puffs into the lungs every 6 hours as needed for shortness of breath / dyspnea or wheezing    Chronic obstructive pulmonary disease, unspecified COPD type (H)       aspirin 81 MG EC tablet     90 tablet    Take 1 tablet (81 mg) by mouth daily    Unstable angina (H)       atorvastatin 40 MG tablet    LIPITOR    90 tablet    Take 1 tablet (40 mg) by mouth daily    Hyperlipidemia LDL goal <100       blood glucose monitoring lancets     100 each    Use to test blood sugar 3 times daily or as directed.    Type 2 diabetes mellitus with diabetic peripheral angiopathy without gangrene, without long-term current use of insulin (H)       blood glucose monitoring  meter device kit     1 kit    Use to test blood sugars 3 times daily or as directed.    Type 2 diabetes, HbA1C goal < 8% (H)       blood glucose monitoring test strip    ONETOUCH ULTRA    3 Box    Use to test blood sugars 3 times daily or as directed.    Type 2 diabetes mellitus with diabetic peripheral angiopathy without gangrene, without long-term current use of insulin (H)       brimonidine 0.2 % ophthalmic solution    ALPHAGAN    1 Bottle    Place 1 drop into the right eye 2 times daily    Primary open angle glaucoma of both eyes, severe stage       calcium citrate-vitamin D 315-250 MG-UNIT Tabs per tablet    CITRACAL    120 tablet    Take 2 tablets by mouth daily    Osteoporosis       cetirizine 10 MG tablet    zyrTEC    90 tablet    Take 1 tablet (10 mg) by mouth daily as needed for allergies    Seasonal allergic rhinitis, unspecified allergic rhinitis trigger       CYANOCOBALAMIN PO      Take 2,000 mcg by mouth daily        diclofenac 1 % Gel topical gel    VOLTAREN    100 g    Apply 2 g topically 4 times daily to hands    Primary osteoarthritis of both hands       dorzolamide 2 % ophthalmic solution    TRUSOPT    1 Bottle    Place 1 drop into the right eye 2 times daily    Primary open angle glaucoma of both eyes, severe stage       EPINEPHrine 0.3 MG/0.3ML injection 2-pack    EPIPEN/ADRENACLICK/or ANY BX GENERIC EQUIV    0.6 mL    Inject 0.3 mLs (0.3 mg) into the muscle as needed for anaphylaxis    Bee sting reaction, undetermined intent, subsequent encounter       escitalopram 10 MG tablet    LEXAPRO     Take 10 mg by mouth daily        ESTRACE VAGINAL 0.1 MG/GM cream   Generic drug:  estradiol     42.5 g    USE DIME SIZE AMOUNT 3X A WEEK AT NIGHT    Atrophic vaginitis       estradiol 1 MG tablet    ESTRACE    90 tablet    Take 1 tablet (1 mg) by mouth daily    Person who has had sex change operation       ferrous sulfate 325 (65 Fe) MG tablet    IRON    60 tablet    Take 1 tablet (325 mg) by mouth 2 times  daily With meals        fluticasone 50 MCG/ACT spray    FLONASE     Spray 1 spray into both nostrils daily        guaiFENesin-codeine 100-10 MG/5ML Soln solution    ROBITUSSIN AC    120 mL    Take 5 mLs by mouth every 4 hours as needed for cough    Upper respiratory tract infection, unspecified type       hydrochlorothiazide 12.5 MG capsule    MICROZIDE    90 capsule    Take 1 capsule (12.5 mg) by mouth daily    Essential hypertension with goal blood pressure less than 130/80       INCRUSE ELLIPTA 62.5 MCG/INH oral inhaler   Generic drug:  umeclidinium      Inhale 1 puff into the lungs daily        lactulose 10 GM/15ML solution    CHRONULAC     Take 20 g by mouth as needed for constipation        latanoprost 0.005 % ophthalmic solution    XALATAN    2.5 mL    Place 1 drop into both eyes At Bedtime    Primary open angle glaucoma of both eyes, severe stage       levETIRAcetam 500 MG tablet    KEPPRA    180 tablet    Take 1 tablet (500 mg) by mouth 2 times daily    Nausea       lidocaine 5 % Patch    LIDODERM    30 patch    APPLY 1 TO 3 PATCHES TO PAINFUL AREA AT ONCE FOR UP TO 12 HOURS WITHIN A 24 HOUR PERIOD REMOVE AFTER 12 HOURS    Chronic pain syndrome, 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 (H)       lisinopril 20 MG tablet    PRINIVIL/ZESTRIL    90 tablet    Take 1 tablet (20 mg) by mouth daily    History of coronary artery disease       MEDICATION GIVEN BY INTRATHECAL PUMP - INSTRUCTION      continuous 2/8/18: Pump managed by [facility] in [place] [phone].  Conc: Bupivacaine 20 mg/mL and Fentanyl 2000 mcg/mL, morphine 2 mg/mL Continuous:  Fentanyl 796 mcg/day, Bupivacaine 7.96 mg/day, Morphine 0.796 mg/day  Boluses: Up to 7 boluses per 24-hr period of Bupivicaine 0.5994 mg and Fentanyl 59.9 mcg morphine 0.0599 mg. Pump Refill Date 02/28/18        metFORMIN 500 MG 24 hr tablet    GLUCOPHAGE-XR    90 tablet    Take 1 tablet (500 mg) by mouth daily (with dinner)     Type 2 diabetes mellitus with diabetic peripheral angiopathy and gangrene, without long-term current use of insulin (H)       metoprolol succinate 50 MG 24 hr tablet    TOPROL-XL    90 tablet    Take 1 tablet (50 mg) by mouth daily    History of coronary artery disease       MULTIVITAMIN PO      Take 1 tablet by mouth daily        nicotine 14 MG/24HR 24 hr patch    NICODERM CQ    30 patch    Place 1 patch onto the skin daily    Tobacco dependence syndrome       nitroGLYcerin 0.4 MG sublingual tablet    NITROSTAT    25 tablet    Place 1 tablet (0.4 mg) under the tongue every 5 minutes as needed for chest pain if you are still having symptoms after 3 doses (15 minutes) call 911.    Chronic systolic congestive heart failure (H), Old myocardial infarction       ondansetron 8 MG ODT tab    ZOFRAN-ODT    30 tablet    Take 1 tablet (8 mg) by mouth every 8 hours as needed    Other migraine without status migrainosus, not intractable       order for DME     1 Month    Equipment being ordered: Depends briefs    Incontinence       order for DME     1 Device    Equipment being ordered: Power Wheelchair    CVA (cerebral infarction), HTN (hypertension)       order for DME     1 Box    Equipment being ordered: Glucerna daily shakes with each meal    Type 2 diabetes mellitus with other diabetic neurological complication       * order for DME     1 Units    Equipment being ordered: Nebulizer and tubing supplies    Simple chronic bronchitis (H)       * order for DME     1 Device    Equipment being ordered: CPAP supplies mask, hose, filters, cushion fax to [facility] at [phone] Disp: 10 DeviceRefills: prn Class: Local PrintStart: 2/10/2017    Chronic obstructive pulmonary disease, unspecified COPD type (H)       * order for DME     10 Device    Equipment being ordered: CPAP supplies mask, hose, filters, cushion  fax to [facility] at [phone]    COPD (chronic obstructive pulmonary disease) (H)       * order for DME      1 Device    Hospital bed with side rails    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 (H)       * order for DME     1 Device    Full electric hospital bed with half rails  Dx: Q14448, I110, J449 Length of need: lifetime    Status post bilateral above knee amputation (H)       * order for DME     1 Device    Wheel Chair Cushion: 18 x 18 inch Roho cushion    Status post bilateral above knee amputation (H)       order for DME     1 Package    Equipment being ordered: bandage tape, prefer paper    Burn of skin       oxyCODONE IR 5 MG tablet    ROXICODONE    5 tablet    Take 1 tablet (5 mg) by mouth every 6 hours as needed for pain    Bladder spasm       pantoprazole 40 MG EC tablet    PROTONIX    30 tablet    Take 1 tablet (40 mg) by mouth daily    Gastroesophageal reflux disease without esophagitis       Phenytoin Sodium Extended 200 MG Caps     60 capsule    Take 200 mg by mouth 2 times daily    Seizure disorder (H)       polyethylene glycol Packet    MIRALAX/GLYCOLAX     Take 17 g by mouth daily Dissolved in water or juice        pregabalin 75 MG capsule    LYRICA    120 capsule    Take 2 capsules (150 mg) by mouth 2 times daily    Pain in both upper extremities       PROCHLORPERAZINE MALEATE PO      Take 10 mg by mouth every 8 hours as needed for nausea or vomiting        risperiDONE 0.5 MG tablet    risperDAL     Take 0.5 mg by mouth At Bedtime        silver sulfADIAZINE 1 % cream    SILVADENE    50 g    Apply topically 2 times daily    Burn       sucralfate 1 GM tablet    CARAFATE    120 tablet    Take 1 tablet (1 g) by mouth 4 times daily May dissolve in 10 mL water is necessary. (Start upon completion of carafate suspension)    Adjustment disorder with depressed mood       tetracycline 500 MG capsule    ACHROMYCIN/SUMYCIN    28 capsule    Take 1 capsule (500 mg) by mouth 2 times daily    Dysuria       traZODone 50 MG tablet    DESYREL     Take 150 mg by mouth At Bedtime        vitamin D 2000  units tablet     100 tablet    Take 2,000 Units by mouth daily.        zinc 50 MG Tabs      Take 1 tablet by mouth daily        * Notice:  This list has 8 medication(s) that are the same as other medications prescribed for you. Read the directions carefully, and ask your doctor or other care provider to review them with you.

## 2018-06-14 NOTE — PROGRESS NOTES
6/6/18: ELVIRA received call from member stating that she received a large bill from [facility] pharmacy in the amount of $825.63.  Member/Elvira/Cas had dealt with bill back in 1/2017-05/2017.   Member asked Sam at [facility] to call ELVIRA.  Member states she is very worked up over this large bill.       ELVIRA spoke with Sam at [facility] at [phone]  Ref# [id number].   Sam states that this amount is from copays - discussed furthe on the amount and must be for more than copays.  She then stated that insurance paid on the amounts and then is remainder left over.   ELVIRA requested Sam to send detailed bill on amount owed.       Jamie Sharma RN, PHN  [facility]

## 2018-06-14 NOTE — PROGRESS NOTES
6/12/18: Received email from Shavon stating that she spoke with [address]  - she will f/u with [email or web address].     Member aware.   Member states that she received another bill from [address] in same amount.     Jamie Sharma RN, PHN  [employer]

## 2018-06-14 NOTE — PROGRESS NOTES
6/7/18: Received fax from Sam at [employer] - 29 pages  - bill is not clear - shows amounts and then reverses - unsure what all adds up to full amount of bill to member.  CM reviewed with Supervisor, Meredith Perry, and reviewed emails/conversations from 1/2017 - 5/2017.   Supervisor advised to send to Shavon Lawrence.  Sent to Shavon  - she will f/u with [employer] and [employer].      Jamie Sharma RN, PHN  [employer]

## 2018-06-15 ENCOUNTER — TELEPHONE (OUTPATIENT)
Dept: NURSING | Facility: CLINIC | Age: 68
End: 2018-06-15

## 2018-06-15 NOTE — TELEPHONE ENCOUNTER
Patient scheduled to see KRISTY today at 1320 for possible UTI.  Has suprapubic catheter.  If urine sample is needed we do not have the appropriate supplies within clinic.  Discussed with KRISTY - patient either to follow up with urology or bring in catheter supplies for staff to obtain urine specimen.  Left message for patient to call back.

## 2018-06-16 ENCOUNTER — OFFICE VISIT (OUTPATIENT)
Dept: URGENT CARE | Facility: URGENT CARE | Age: 68
End: 2018-06-16
Payer: COMMERCIAL

## 2018-06-16 VITALS
OXYGEN SATURATION: 96 % | DIASTOLIC BLOOD PRESSURE: 62 MMHG | HEART RATE: 86 BPM | RESPIRATION RATE: 12 BRPM | TEMPERATURE: 97.8 F | SYSTOLIC BLOOD PRESSURE: 108 MMHG

## 2018-06-16 DIAGNOSIS — N39.0 E-COLI UTI: ICD-10-CM

## 2018-06-16 DIAGNOSIS — N30.00 ACUTE CYSTITIS WITHOUT HEMATURIA: Primary | ICD-10-CM

## 2018-06-16 DIAGNOSIS — B96.20 E-COLI UTI: ICD-10-CM

## 2018-06-16 LAB
ALBUMIN UR-MCNC: NEGATIVE MG/DL
APPEARANCE UR: CLEAR
BACTERIA #/AREA URNS HPF: ABNORMAL /HPF
BILIRUB UR QL STRIP: NEGATIVE
COLOR UR AUTO: YELLOW
GLUCOSE UR STRIP-MCNC: NEGATIVE MG/DL
HGB UR QL STRIP: ABNORMAL
KETONES UR STRIP-MCNC: NEGATIVE MG/DL
LEUKOCYTE ESTERASE UR QL STRIP: ABNORMAL
NITRATE UR QL: POSITIVE
PH UR STRIP: 7 PH (ref 5–7)
RBC #/AREA URNS AUTO: ABNORMAL /HPF
SOURCE: ABNORMAL
SP GR UR STRIP: 1.01 (ref 1–1.03)
UROBILINOGEN UR STRIP-ACNC: 0.2 EU/DL (ref 0.2–1)
WBC #/AREA URNS AUTO: ABNORMAL /HPF

## 2018-06-16 PROCEDURE — 87086 URINE CULTURE/COLONY COUNT: CPT | Performed by: PHYSICIAN ASSISTANT

## 2018-06-16 PROCEDURE — 81001 URINALYSIS AUTO W/SCOPE: CPT | Performed by: FAMILY MEDICINE

## 2018-06-16 PROCEDURE — 87088 URINE BACTERIA CULTURE: CPT | Performed by: PHYSICIAN ASSISTANT

## 2018-06-16 PROCEDURE — 99214 OFFICE O/P EST MOD 30 MIN: CPT | Performed by: PHYSICIAN ASSISTANT

## 2018-06-16 PROCEDURE — 87186 SC STD MICRODIL/AGAR DIL: CPT | Performed by: PHYSICIAN ASSISTANT

## 2018-06-16 RX ORDER — CIPROFLOXACIN 500 MG/1
500 TABLET, FILM COATED ORAL 2 TIMES DAILY
Qty: 14 TABLET | Refills: 0 | Status: SHIPPED | OUTPATIENT
Start: 2018-06-16 | End: 2018-06-16

## 2018-06-16 RX ORDER — CIPROFLOXACIN 500 MG/1
500 TABLET, FILM COATED ORAL 2 TIMES DAILY
Qty: 14 TABLET | Refills: 0 | Status: SHIPPED | OUTPATIENT
Start: 2018-06-16 | End: 2018-06-25

## 2018-06-16 NOTE — PROGRESS NOTES
SUBJECTIVE:   Sonya Foote is a 68 year old female with a history  Of Type 2 DM, CAD, ischemic cardiomyopathy and prior CVA who presents today for a possible UTI. Symptoms of dysuria and foul odor have been going on for 2day(s).  Hematuria no.  sudden onsetand moderate.  There is no history of fever, chills, nausea or vomiting.  No history of vaginal or penile discharge. This patient does have a history of urinary tract infections. Patient admits to having some chills. Denies long duration, rigors, flank pain and temperature > 101 degrees F. or vaginal discharge     Past Medical History:   Diagnosis Date     Anemia      Antiplatelet or antithrombotic long-term use      Arthritis      AS (sickle cell trait) (H) 10/8/2013     Blind left eye      BPPV (benign paroxysmal positional vertigo)      Chronic back pain      COPD (chronic obstructive pulmonary disease) (H)      Coronary artery disease      CVA (cerebral infarction) 10/8/2012    x3, residual left sided weakness     Diastolic CHF (H) 9/4/2012     Dilantin toxicity 5/31/2013     Esophageal candidiasis (H)      GERD (gastroesophageal reflux disease)      Heart attack      Hernia, abdominal      History of blood transfusion     x5     History of thrombophlebitis      HTN (hypertension) 9/4/2012     Hyperlipidemia LDL goal <100 9/4/2012     Legally blind in right eye, as defined in [place]     No periphal vision right eye     Osteoporosis 1/21/2013     Other chronic pain      PAD (peripheral artery disease) (H)      Palpitations      Person who has had sex change operation 1/20/2014     Pneumonia      Schizoaffective disorder (H)      Seizure disorder (H) 9/4/2012     Sleep apnea     CPAP     Thrombosis of leg      Type 2 diabetes, HbA1C goal < 8% (H) 9/4/2012     Uncomplicated asthma      Unspecified cerebral artery occlusion with cerebral infarction      Current Outpatient Prescriptions   Medication Sig Dispense Refill     ACETAMINOPHEN PO Take 1,000 mg by mouth every 4  hours as needed for pain Not to exceed 4000 mg/day       ADVAIR DISKUS 250-50 MCG/DOSE diskus inhaler Inhale 1 puff into the lungs 2 times daily 60 Inhaler 11     albuterol (2.5 MG/3ML) 0.083% neb solution INHALE 1 VIAL VIA NEBULIZER EVERY 6 HOURS AS NEEDED 360 mL 11     atorvastatin (LIPITOR) 40 MG tablet Take 1 tablet (40 mg) by mouth daily 90 tablet 2     blood glucose monitoring (ONE TOUCH ULTRA 2) meter device kit Use to test blood sugars 3 times daily or as directed. 1 kit 0     blood glucose monitoring (ONE TOUCH ULTRA) test strip Use to test blood sugars 3 times daily or as directed. 3 Box 3     blood glucose monitoring (ONE TOUCH ULTRASOFT) lancets Use to test blood sugar 3 times daily or as directed. 100 each PRN     brimonidine (ALPHAGAN) 0.2 % ophthalmic solution Place 1 drop into the right eye 2 times daily 1 Bottle 11     calcium citrate-vitamin D (CITRACAL) 315-250 MG-UNIT TABS Take 2 tablets by mouth daily 120 tablet 5     cetirizine (ZYRTEC) 10 MG tablet Take 1 tablet (10 mg) by mouth daily as needed for allergies 90 tablet 3     Cholecalciferol (VITAMIN D) 2000 UNITS tablet Take 2,000 Units by mouth daily. 100 tablet 3     CYANOCOBALAMIN PO Take 2,000 mcg by mouth daily       diclofenac (VOLTAREN) 1 % GEL topical gel Apply 2 g topically 4 times daily to hands 100 g 11     dorzolamide (TRUSOPT) 2 % ophthalmic solution Place 1 drop into the right eye 2 times daily 1 Bottle 11     EPINEPHrine (EPIPEN/ADRENACLICK/OR ANY BX GENERIC EQUIV) 0.3 MG/0.3ML injection 2-pack Inject 0.3 mLs (0.3 mg) into the muscle as needed for anaphylaxis 0.6 mL 1     escitalopram (LEXAPRO) 10 MG tablet Take 10 mg by mouth daily        ESTRACE VAGINAL 0.1 MG/GM cream USE DIME SIZE AMOUNT 3X A WEEK AT NIGHT 42.5 g 11     ferrous sulfate (IRON) 325 (65 FE) MG tablet Take 1 tablet (325 mg) by mouth 2 times daily With meals 60 tablet 2     fluticasone (FLONASE) 50 MCG/ACT nasal spray Spray 1 spray into both nostrils daily        hydrochlorothiazide (MICROZIDE) 12.5 MG capsule Take 1 capsule (12.5 mg) by mouth daily 90 capsule 2     lactulose (CHRONULAC) 10 GM/15ML solution Take 20 g by mouth as needed for constipation       latanoprost (XALATAN) 0.005 % ophthalmic solution Place 1 drop into both eyes At Bedtime 2.5 mL 11     levETIRAcetam (KEPPRA) 500 MG tablet Take 1 tablet (500 mg) by mouth 2 times daily 180 tablet 1     lidocaine (LIDODERM) 5 % Patch APPLY 1 TO 3 PATCHES TO PAINFUL AREA AT ONCE FOR UP TO 12 HOURS WITHIN A 24 HOUR PERIOD REMOVE AFTER 12 HOURS 30 patch 5     lisinopril (PRINIVIL/ZESTRIL) 20 MG tablet Take 1 tablet (20 mg) by mouth daily 90 tablet 0     MEDICATION GIVEN BY INTRATHECAL PUMP - INSTRUCTION continuous 2/8/18: Pump managed by [facility] in [place] [phone].   Conc: Bupivacaine 20 mg/mL and Fentanyl 2000 mcg/mL, morphine 2 mg/mL  Continuous:  Fentanyl 796 mcg/day, Bupivacaine 7.96 mg/day, Morphine 0.796 mg/day   Boluses: Up to 7 boluses per 24-hr period of Bupivicaine 0.5994 mg and Fentanyl 59.9 mcg morphine 0.0599 mg.  Pump Refill Date 02/28/18       metFORMIN (GLUCOPHAGE-XR) 500 MG 24 hr tablet Take 1 tablet (500 mg) by mouth daily (with dinner) 90 tablet 3     metoprolol succinate (TOPROL-XL) 50 MG 24 hr tablet Take 1 tablet (50 mg) by mouth daily 90 tablet 0     Multiple Vitamin (MULTIVITAMIN OR) Take 1 tablet by mouth daily        nicotine (NICODERM CQ) 14 MG/24HR 24 hr patch Place 1 patch onto the skin daily 30 patch 0     nitroglycerin (NITROSTAT) 0.4 MG SL tablet Place 1 tablet (0.4 mg) under the tongue every 5 minutes as needed for chest pain if you are still having symptoms after 3 doses (15 minutes) call 911. 25 tablet 1     ondansetron (ZOFRAN-ODT) 8 MG ODT tab Take 1 tablet (8 mg) by mouth every 8 hours as needed 30 tablet 5     order for DME Equipment being ordered: bandage tape, prefer paper 1 Package 0     order for DME Full electric hospital bed with half  rails    Dx: M96693, I110, J449  Length of need: lifetime 1 Device 0     order for DME Wheel Chair Cushion: 18 x 18 inch Roho cushion 1 Device 0     order for DME Hospital bed with side rails 1 Device 0     order for DME Equipment being ordered: CPAP supplies mask, hose, filters, cushion    fax to [facility] at [phone] 10 Device prn     order for DME Equipment being ordered: CPAP supplies mask, hose, filters, cushion fax to [facility] at [phone]  Disp: 10 Device Refills: prn   Class: Local Print Start: 2/10/2017 1 Device 0     order for DME Equipment being ordered: Nebulizer and tubing supplies 1 Units 0     order for DME Equipment being ordered: Glucerna daily shakes with each meal 1 Box 11     ORDER FOR DME Equipment being ordered: Power Wheelchair 1 Device 0     oxyCODONE IR (ROXICODONE) 5 MG tablet Take 1 tablet (5 mg) by mouth every 6 hours as needed for pain 5 tablet 0     pantoprazole (PROTONIX) 40 MG EC tablet Take 1 tablet (40 mg) by mouth daily 30 tablet 5     phenytoin 200 MG CAPS Take 200 mg by mouth 2 times daily (Patient taking differently: Take 200 mg by mouth 2 times daily (takes at 8 AM and 8 PM)) 60 capsule 0     polyethylene glycol (MIRALAX/GLYCOLAX) Packet Take 17 g by mouth daily Dissolved in water or juice        pregabalin (LYRICA) 75 MG capsule Take 2 capsules (150 mg) by mouth 2 times daily 120 capsule 5     risperiDONE (RISPERDAL) 0.5 MG tablet Take 0.5 mg by mouth At Bedtime       sucralfate (CARAFATE) 1 GM tablet Take 1 tablet (1 g) by mouth 4 times daily May dissolve in 10 mL water is necessary. (Start upon completion of carafate suspension) 120 tablet 11     tetracycline (ACHROMYCIN/SUMYCIN) 500 MG capsule Take 1 capsule (500 mg) by mouth 2 times daily 28 capsule 0     traZODone (DESYREL) 50 MG tablet Take 150 mg by mouth At Bedtime        umeclidinium (INCRUSE ELLIPTA) 62.5 MCG/INH oral inhaler Inhale 1 puff into the lungs daily       VENTOLIN  (90 BASE)  MCG/ACT Inhaler Inhale 2 puffs into the lungs every 6 hours as needed for shortness of breath / dyspnea or wheezing 3 Inhaler 5     zinc 50 MG TABS Take 1 tablet by mouth daily       aspirin EC 81 MG EC tablet Take 1 tablet (81 mg) by mouth daily 90 tablet 3     estradiol (ESTRACE) 1 MG tablet Take 1 tablet (1 mg) by mouth daily (Patient not taking: Reported on 6/16/2018) 90 tablet 3     guaiFENesin-codeine (ROBITUSSIN AC) 100-10 MG/5ML SOLN solution Take 5 mLs by mouth every 4 hours as needed for cough (Patient not taking: Reported on 6/16/2018) 120 mL 0     ORDER FOR DME Equipment being ordered: Depends briefs (Patient not taking: Reported on 6/16/2018) 1 Month 11     PROCHLORPERAZINE MALEATE PO Take 10 mg by mouth every 8 hours as needed for nausea or vomiting       silver sulfADIAZINE (SILVADENE) 1 % cream Apply topically 2 times daily (Patient not taking: Reported on 6/16/2018) 50 g 0     Social History   Substance Use Topics     Smoking status: Current Every Day Smoker     Packs/day: 0.25     Years: 30.00     Types: Cigarettes, Cigars     Last attempt to quit: 11/1/2001     Smokeless tobacco: Never Used     Alcohol use No     ROS:  C: POSITIVE for chills  INTEGUMENTARY/SKIN: NEGATIVE for worrisome rashes, moles or lesions  E/M: NEGATIVE for sore throat, ear or sinus problems  R: NEGATIVE for cough  CV: NEGATIVE for chest pain, palpitations or peripheral edema  GI: NEGATIVE for nausea, abdominal pain, heartburn, or change in bowel habits  : POSITIVE for dysuria and foul odor  MUSCULOSKELETAL: POSITIVE for back pain (chronic)  NEURO: NEGATIVE for weakness, dizziness or paresthesias  ENDOCRINE: POSITIVE for HX of DM  HEME/ALLERGY/IMMUNE: NEGATIVE for swollen nodes      OBJECTIVE:  /62  Pulse 86  Temp 97.8  F (36.6  C) (Oral)  Resp 12  SpO2 96%  GENERAL APPEARANCE: healthy, alert and no distress  RESP: lungs clear to auscultation - no rales, rhonchi or wheezes  CV: regular rates and rhythm, normal S1  S2, no murmur noted  ABDOMEN: soft, Mild discomfort. NO rebound. No guarding.   BACK: No CVA tenderness  SKIN: no suspicious lesions or rashes    ASSESSMENT / PLAN:  1. Acute cystitis without hematuria  Drink plenty of fluids.  Signs and symptoms of pyelonephritis mentioned.  Follow up with primary care physician if not improving    - ciprofloxacin (CIPRO) 500 MG tablet; Take 1 tablet (500 mg) by mouth 2 times daily  Dispense: 14 tablet; Refill: 0      Desiree Barber PA-C

## 2018-06-16 NOTE — MR AVS SNAPSHOT
After Visit Summary   6/16/2018    Sonya Foote    MRN: [id number]           Patient Information     Date Of Birth          1950        Visit Information        Provider Department      6/16/2018 2:00 PM Desiree Barber PA-C [facility]        Today's Diagnoses     Acute cystitis without hematuria    -  1       Follow-ups after your visit        Your next 10 appointments already scheduled     Jun 26, 2018 10:45 AM CDT   Return Visit with Bj Anderson MD   [facility] ([facility])    [address]  [facility] [address]   [phone] OPT 2            Jul 05, 2018  9:45 AM CDT   (Arrive by 9:30 AM)   Return Visit with Elizabeth Oliver MD   [facility] ([facility])    [address]  4th Floor  [place] [facility] [id number] [phone]            Jul 26, 2018 10:15 AM CDT   RETURN GLAUCOMA with Marely Robin MD   [facility] ([facility])    [address]  [address]  [place] [facility] [address]   [phone]            Aug 13, 2018  9:00 AM CDT   (Arrive by 8:45 AM)   Return Visit with Alina Jennings MD   [facility] ([facility])    [address]  [address]  [place] [facility] [id number] [phone]              Who to contact     If you have questions or need follow up information about today's clinic visit or your schedule please contact [facility] [place] [facility] directly at [phone].  Normal or non-critical lab and imaging results will be communicated to you by MyChart, letter or phone within 4 business days after the clinic has received the results. If you do not hear from us within 7 days, please contact the clinic through MyChart or phone. If you have a critical or  abnormal lab result, we will notify you by phone as soon as possible.  Submit refill requests through [employer] or call your pharmacy and they will forward the refill request to us. Please allow 3 business days for your refill to be completed.          Additional Information About Your Visit        Care EveryWhere ID     This is your Care EveryWhere ID. This could be used by other organizations to access your [facility] medical records  [id number]        Your Vitals Were     Pulse Temperature Respirations Pulse Oximetry          86 97.8  F (36.6  C) (Oral) 12 96%         Blood Pressure from Last 3 Encounters:   06/16/18 108/62   06/12/18 90/60   06/06/18 112/64    Weight from Last 3 Encounters:   06/12/18 138 lb (62.6 kg)   06/06/18 138 lb (62.6 kg)   05/17/18 138 lb (62.6 kg)              We Performed the Following     UA with Microscopic reflex to Culture     Urine Culture Aerobic Bacterial          Today's Medication Changes          These changes are accurate as of 6/16/18  3:31 PM.  If you have any questions, ask your nurse or doctor.               Start taking these medicines.        Dose/Directions    ciprofloxacin 500 MG tablet   Commonly known as:  CIPRO   Used for:  Acute cystitis without hematuria   Started by:  Desiree Barber PA-C        Dose:  500 mg   Take 1 tablet (500 mg) by mouth 2 times daily   Quantity:  14 tablet   Refills:  0         These medicines have changed or have updated prescriptions.        Dose/Directions    Phenytoin Sodium Extended 200 MG Caps   This may have changed:  additional instructions   Used for:  Seizure disorder (H)        Dose:  200 mg   Take 200 mg by mouth 2 times daily   Quantity:  60 capsule   Refills:  0         Stop taking these medicines if you haven't already. Please contact your care team if you have questions.     estradiol 1 MG tablet   Commonly known as:  ESTRACE   Stopped by:  Desiree Barber PA-C           guaiFENesin-codeine 100-10 MG/5ML Soln  solution   Commonly known as:  ROBITUSSIN AC   Stopped by:  Desiree Barber PA-C           PROCHLORPERAZINE MALEATE PO   Stopped by:  Desiree Barebr PA-C           silver sulfADIAZINE 1 % cream   Commonly known as:  SILVADENE   Stopped by:  Desiree Barber PA-C                Where to get your medicines      Some of these will need a paper prescription and others can be bought over the counter.  Ask your nurse if you have questions.     Bring a paper prescription for each of these medications     ciprofloxacin 500 MG tablet                Primary Care Provider Office Phone # Fax #    Allan Casey [phone]       [address] [facility] [address]        Equal Access to Services     KARI CARREON : Hadii shaheen gastelum hadasho Soomaali, waaxda luqadaha, qaybta kaalmada adeegyada, tonie marvin . So [facility] [phone].    ATENCIÓN: Si habla español, tiene a briones disposición servicios gratuitos de asistencia lingüística. [facility] [phone].    We comply with applicable federal civil rights laws and [place] laws. We do not discriminate on the basis of race, color, national origin, age, disability, sex, sexual orientation, or gender identity.            Thank you!     Thank you for choosing [facility]  for your care. Our goal is always to provide you with excellent care. Hearing back from our patients is one way we can continue to improve our services. Please take a few minutes to complete the written survey that you may receive in the mail after your visit with us. Thank you!             Your Updated Medication List - Protect others around you: Learn how to safely use, store and throw away your medicines at www.disposemymeds.org.          This list is accurate as of 6/16/18  3:31 PM.  Always use your most recent med list.                   Brand Name Dispense Instructions for use Diagnosis    ACETAMINOPHEN PO      Take  1,000 mg by mouth every 4 hours as needed for pain Not to exceed 4000 mg/day        ADVAIR DISKUS 250-50 MCG/DOSE diskus inhaler   Generic drug:  fluticasone-salmeterol     60 Inhaler    Inhale 1 puff into the lungs 2 times daily    Acute bronchitis       * albuterol (2.5 MG/3ML) 0.083% neb solution     360 mL    INHALE 1 VIAL VIA NEBULIZER EVERY 6 HOURS AS NEEDED    Chronic obstructive pulmonary disease, unspecified COPD type (H)       * VENTOLIN  (90 Base) MCG/ACT Inhaler   Generic drug:  albuterol     3 Inhaler    Inhale 2 puffs into the lungs every 6 hours as needed for shortness of breath / dyspnea or wheezing    Chronic obstructive pulmonary disease, unspecified COPD type (H)       aspirin 81 MG EC tablet     90 tablet    Take 1 tablet (81 mg) by mouth daily    Unstable angina (H)       atorvastatin 40 MG tablet    LIPITOR    90 tablet    Take 1 tablet (40 mg) by mouth daily    Hyperlipidemia LDL goal <100       blood glucose monitoring lancets     100 each    Use to test blood sugar 3 times daily or as directed.    Type 2 diabetes mellitus with diabetic peripheral angiopathy without gangrene, without long-term current use of insulin (H)       blood glucose monitoring meter device kit     1 kit    Use to test blood sugars 3 times daily or as directed.    Type 2 diabetes, HbA1C goal < 8% (H)       blood glucose monitoring test strip    ONETOUCH ULTRA    3 Box    Use to test blood sugars 3 times daily or as directed.    Type 2 diabetes mellitus with diabetic peripheral angiopathy without gangrene, without long-term current use of insulin (H)       brimonidine 0.2 % ophthalmic solution    ALPHAGAN    1 Bottle    Place 1 drop into the right eye 2 times daily    Primary open angle glaucoma of both eyes, severe stage       calcium citrate-vitamin D 315-250 MG-UNIT Tabs per tablet    CITRACAL    120 tablet    Take 2 tablets by mouth daily    Osteoporosis       cetirizine 10 MG tablet    zyrTEC    90 tablet     Take 1 tablet (10 mg) by mouth daily as needed for allergies    Seasonal allergic rhinitis, unspecified allergic rhinitis trigger       ciprofloxacin 500 MG tablet    CIPRO    14 tablet    Take 1 tablet (500 mg) by mouth 2 times daily    Acute cystitis without hematuria       CYANOCOBALAMIN PO      Take 2,000 mcg by mouth daily        diclofenac 1 % Gel topical gel    VOLTAREN    100 g    Apply 2 g topically 4 times daily to hands    Primary osteoarthritis of both hands       dorzolamide 2 % ophthalmic solution    TRUSOPT    1 Bottle    Place 1 drop into the right eye 2 times daily    Primary open angle glaucoma of both eyes, severe stage       EPINEPHrine 0.3 MG/0.3ML injection 2-pack    EPIPEN/ADRENACLICK/or ANY BX GENERIC EQUIV    0.6 mL    Inject 0.3 mLs (0.3 mg) into the muscle as needed for anaphylaxis    Bee sting reaction, undetermined intent, subsequent encounter       escitalopram 10 MG tablet    LEXAPRO     Take 10 mg by mouth daily        ESTRACE VAGINAL 0.1 MG/GM cream   Generic drug:  estradiol     42.5 g    USE DIME SIZE AMOUNT 3X A WEEK AT NIGHT    Atrophic vaginitis       ferrous sulfate 325 (65 Fe) MG tablet    IRON    60 tablet    Take 1 tablet (325 mg) by mouth 2 times daily With meals        fluticasone 50 MCG/ACT spray    FLONASE     Spray 1 spray into both nostrils daily        hydrochlorothiazide 12.5 MG capsule    MICROZIDE    90 capsule    Take 1 capsule (12.5 mg) by mouth daily    Essential hypertension with goal blood pressure less than 130/80       INCRUSE ELLIPTA 62.5 MCG/INH oral inhaler   Generic drug:  umeclidinium      Inhale 1 puff into the lungs daily        lactulose 10 GM/15ML solution    CHRONULAC     Take 20 g by mouth as needed for constipation        latanoprost 0.005 % ophthalmic solution    XALATAN    2.5 mL    Place 1 drop into both eyes At Bedtime    Primary open angle glaucoma of both eyes, severe stage       levETIRAcetam 500 MG tablet    KEPPRA    180 tablet    Take 1  tablet (500 mg) by mouth 2 times daily    Nausea       lidocaine 5 % Patch    LIDODERM    30 patch    APPLY 1 TO 3 PATCHES TO PAINFUL AREA AT ONCE FOR UP TO 12 HOURS WITHIN A 24 HOUR PERIOD REMOVE AFTER 12 HOURS    Chronic pain syndrome, 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 (H)       lisinopril 20 MG tablet    PRINIVIL/ZESTRIL    90 tablet    Take 1 tablet (20 mg) by mouth daily    History of coronary artery disease       MEDICATION GIVEN BY INTRATHECAL PUMP - INSTRUCTION      continuous 2/8/18: Pump managed by [facility] in [place] [phone].  Conc: Bupivacaine 20 mg/mL and Fentanyl 2000 mcg/mL, morphine 2 mg/mL Continuous:  Fentanyl 796 mcg/day, Bupivacaine 7.96 mg/day, Morphine 0.796 mg/day  Boluses: Up to 7 boluses per 24-hr period of Bupivicaine 0.5994 mg and Fentanyl 59.9 mcg morphine 0.0599 mg. Pump Refill Date 02/28/18        metFORMIN 500 MG 24 hr tablet    GLUCOPHAGE-XR    90 tablet    Take 1 tablet (500 mg) by mouth daily (with dinner)    Type 2 diabetes mellitus with diabetic peripheral angiopathy and gangrene, without long-term current use of insulin (H)       metoprolol succinate 50 MG 24 hr tablet    TOPROL-XL    90 tablet    Take 1 tablet (50 mg) by mouth daily    History of coronary artery disease       MULTIVITAMIN PO      Take 1 tablet by mouth daily        nicotine 14 MG/24HR 24 hr patch    NICODERM CQ    30 patch    Place 1 patch onto the skin daily    Tobacco dependence syndrome       nitroGLYcerin 0.4 MG sublingual tablet    NITROSTAT    25 tablet    Place 1 tablet (0.4 mg) under the tongue every 5 minutes as needed for chest pain if you are still having symptoms after 3 doses (15 minutes) call 911.    Chronic systolic congestive heart failure (H), Old myocardial infarction       ondansetron 8 MG ODT tab    ZOFRAN-ODT    30 tablet    Take 1 tablet (8 mg) by mouth every 8 hours as needed    Other migraine without status migrainosus, not  intractable       order for DME     1 Month    Equipment being ordered: Depends briefs    Incontinence       order for DME     1 Device    Equipment being ordered: Power Wheelchair    CVA (cerebral infarction), HTN (hypertension)       order for DME     1 Box    Equipment being ordered: Glucerna daily shakes with each meal    Type 2 diabetes mellitus with other diabetic neurological complication       * order for DME     1 Units    Equipment being ordered: Nebulizer and tubing supplies    Simple chronic bronchitis (H)       * order for DME     1 Device    Equipment being ordered: CPAP supplies mask, hose, filters, cushion fax to [facility] at [phone] Disp: 10 DeviceRefills: prn Class: Local PrintStart: 2/10/2017    Chronic obstructive pulmonary disease, unspecified COPD type (H)       * order for DME     10 Device    Equipment being ordered: CPAP supplies mask, hose, filters, cushion  fax to [facility] at [phone]    COPD (chronic obstructive pulmonary disease) (H)       * order for DME     1 Device    Hospital bed with side rails    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 (H)       * order for DME     1 Device    Full electric hospital bed with half rails  Dx: J56638, I110, J449 Length of need: lifetime    Status post bilateral above knee amputation (H)       * order for DME     1 Device    Wheel Chair Cushion: 18 x 18 inch Roho cushion    Status post bilateral above knee amputation (H)       order for DME     1 Package    Equipment being ordered: bandage tape, prefer paper    Burn of skin       oxyCODONE IR 5 MG tablet    ROXICODONE    5 tablet    Take 1 tablet (5 mg) by mouth every 6 hours as needed for pain    Bladder spasm       pantoprazole 40 MG EC tablet    PROTONIX    30 tablet    Take 1 tablet (40 mg) by mouth daily    Gastroesophageal reflux disease without esophagitis       Phenytoin Sodium Extended 200 MG Caps     60 capsule    Take 200 mg by mouth 2 times daily     Seizure disorder (H)       polyethylene glycol Packet    MIRALAX/GLYCOLAX     Take 17 g by mouth daily Dissolved in water or juice        pregabalin 75 MG capsule    LYRICA    120 capsule    Take 2 capsules (150 mg) by mouth 2 times daily    Pain in both upper extremities       risperiDONE 0.5 MG tablet    risperDAL     Take 0.5 mg by mouth At Bedtime        sucralfate 1 GM tablet    CARAFATE    120 tablet    Take 1 tablet (1 g) by mouth 4 times daily May dissolve in 10 mL water is necessary. (Start upon completion of carafate suspension)    Adjustment disorder with depressed mood       tetracycline 500 MG capsule    ACHROMYCIN/SUMYCIN    28 capsule    Take 1 capsule (500 mg) by mouth 2 times daily    Dysuria       traZODone 50 MG tablet    DESYREL     Take 150 mg by mouth At Bedtime        vitamin D 2000 units tablet     100 tablet    Take 2,000 Units by mouth daily.        zinc 50 MG Tabs      Take 1 tablet by mouth daily        * Notice:  This list has 8 medication(s) that are the same as other medications prescribed for you. Read the directions carefully, and ask your doctor or other care provider to review them with you.

## 2018-06-18 LAB
BACTERIA SPEC CULT: ABNORMAL
BACTERIA SPEC CULT: ABNORMAL
SPECIMEN SOURCE: ABNORMAL

## 2018-06-19 RX ORDER — SULFAMETHOXAZOLE/TRIMETHOPRIM 800-160 MG
1 TABLET ORAL 2 TIMES DAILY
Qty: 14 TABLET | Refills: 0 | Status: SHIPPED | OUTPATIENT
Start: 2018-06-19 | End: 2018-06-26

## 2018-06-20 ENCOUNTER — TELEPHONE (OUTPATIENT)
Dept: NURSING | Facility: CLINIC | Age: 68
End: 2018-06-20

## 2018-06-20 NOTE — TELEPHONE ENCOUNTER
Allan, nurse from [facility], calling with request for orders.  Verbal consent given per standing nurse orders.  Please see below for further details.      Skilled nurse visits 1/m x 2 months with 2 PRN visits to assist with catheter changes.  Deandra Peralta RN

## 2018-06-25 ENCOUNTER — OFFICE VISIT (OUTPATIENT)
Dept: INTERNAL MEDICINE | Facility: CLINIC | Age: 68
End: 2018-06-25
Payer: COMMERCIAL

## 2018-06-25 VITALS
TEMPERATURE: 98.4 F | SYSTOLIC BLOOD PRESSURE: 100 MMHG | BODY MASS INDEX: 52.47 KG/M2 | HEART RATE: 64 BPM | RESPIRATION RATE: 15 BRPM | DIASTOLIC BLOOD PRESSURE: 64 MMHG | WEIGHT: 138 LBS | OXYGEN SATURATION: 98 %

## 2018-06-25 DIAGNOSIS — M81.0 AGE-RELATED OSTEOPOROSIS WITHOUT CURRENT PATHOLOGICAL FRACTURE: Primary | ICD-10-CM

## 2018-06-25 DIAGNOSIS — I69.359 CVA, OLD, HEMIPARESIS (H): ICD-10-CM

## 2018-06-25 DIAGNOSIS — N32.89 BLADDER SPASM: ICD-10-CM

## 2018-06-25 PROCEDURE — 99214 OFFICE O/P EST MOD 30 MIN: CPT | Performed by: INTERNAL MEDICINE

## 2018-06-25 RX ORDER — ALENDRONATE SODIUM 70 MG/1
TABLET ORAL
Qty: 4 TABLET | Refills: 1 | Status: CANCELLED | OUTPATIENT
Start: 2018-06-25

## 2018-06-25 RX ORDER — ALENDRONATE SODIUM 70 MG/1
TABLET ORAL
Qty: 12 TABLET | Refills: 3 | Status: SHIPPED | OUTPATIENT
Start: 2018-06-25 | End: 2020-05-05

## 2018-06-25 RX ORDER — OXYCODONE HYDROCHLORIDE 5 MG/1
5 TABLET ORAL EVERY 6 HOURS PRN
Qty: 5 TABLET | Refills: 0 | Status: CANCELLED | OUTPATIENT
Start: 2018-06-25

## 2018-06-25 ASSESSMENT — PAIN SCALES - GENERAL: PAINLEVEL: EXTREME PAIN (9)

## 2018-06-25 NOTE — PROGRESS NOTES
SUBJECTIVE:   Sonya Foote is a 68 year old female who presents to clinic today for the following health issues:    ED/UC Followup:    Facility:  [facility]  Date of visit: 6/16/18  Reason for visit: Acute cystitis   Current Status: No improvement. Patients antibiotic was changed to Bactrim and she has not started- will be picking up today. Still experiencing bladder spasms.     Other concerns:  1. Discuss muscle relaxer for upper back and neck pain     Noted here to follow-up with DEXA scan results:    LATERAL VERTEBRAL ASSESSMENT  Procedure:  Vertebral fracture assessment was performed in the lateral decubitus position using a Lunar Prodigy  densitometer.  Indications for VFA: none listed  Confounding factors for VFA: rib shadows.  The LVA scan is interpretable from T11 to L4.     VFA Findings: Using the semi-quantitative analysis of Som there was evidence of no spinal deformity  VFA Impression: Sonya Foote has no vertebral fractures identified on the VFA.   Further evaluation may be warranted due to presence of confounding factors      IMPRESSION  Osteoporosis  Consider medication intervention  Recommendations include ensuring adequate daily Calcium and Vitamin D intake    Problem list and histories reviewed & adjusted, as indicated.  Additional history: as documented    Patient Active Problem List   Diagnosis     Hyperlipidemia LDL goal <100     Seizure disorder (H)     ACP (advance care planning)     Osteoporosis     Schizoaffective disorder (H)     AS (sickle cell trait) (H)     Vertigo     Person who has had sex change operation     Claudication in peripheral vascular disease (H)     Intestinal malabsorption     GIB (gastrointestinal bleeding)     Cervicalgia     Health Care Home     Asthma     Adjustment disorder with depressed mood     Chronic pain syndrome     Open-angle glaucoma     Hx of colonic polyp     Old myocardial infarction     Iron deficiency anemia     Late effect of stroke     Degeneration of  intervertebral disc of lumbosacral region     Thoracic or lumbosacral neuritis or radiculitis     Cerebral infarction due to occlusion or stenosis of carotid artery     Disorder of bone and cartilage     Hereditary and idiopathic peripheral neuropathy     Androgen insensitivity syndrome     PAD (peripheral artery disease) (H)     Chronic systolic congestive heart failure (H)     Stenosis of carotid artery     Osteoarthritis     Pain in both upper extremities     Atherosclerotic peripheral vascular disease with rest pain (H)     Essential hypertension     Cellulitis of right ankle     Angina pectoris, crescendo (H)     Type 2 diabetes mellitus with diabetic peripheral angiopathy without gangrene, without long-term current use of insulin (H)     Anemia, unspecified type     Critical lower limb ischemia     Testicular feminization     Anxiety disorder due to general medical condition     Central retinal artery occlusion     Lumbosacral radiculitis     Peripheral neuropathy     Osteopenia     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 (H)     Primary open angle glaucoma of both eyes, severe stage     Pseudophakia of right eye     Cataract, left eye     Diabetes mellitus type 2 without retinopathy (H)     Pyelonephritis     Chronic obstructive pulmonary disease, unspecified COPD type (H)     JOSE (obstructive sleep apnea)     Complex sleep apnea syndrome     Coronary artery disease of native artery of native heart with stable angina pectoris (H)     Hyperlipidemia     Ischemic cardiomyopathy     Bee sting reaction, undetermined intent, subsequent encounter     Functional incontinence     Personal history of urinary tract infection     Dysphagia     Single seizure (H)     Essential hypertension with goal blood pressure less than 130/80     Hyperlipidemia LDL goal <70     UTI (urinary tract infection)     Food impaction of esophagus     Esophageal foreign body     Nausea & vomiting     Incontinence     Black  tarry stools     Other migraine without status migrainosus, not intractable     Morbid obesity (H)     Past Surgical History:   Procedure Laterality Date     AMPUTATE LEG ABOVE KNEE Left 6/11/2016    Procedure: AMPUTATE LEG ABOVE KNEE;  Surgeon: Mello Rodriguez MD;  [facility]     AMPUTATE LEG BELOW KNEE Right 11/7/2016    Procedure: AMPUTATE LEG BELOW KNEE;  Surgeon: Savannah Durant MD;  [facility]     AMPUTATE REVISION STUMP LOWER EXTREMITY Right 11/11/2016    Procedure: AMPUTATE REVISION STUMP LOWER EXTREMITY;  Surgeon: Savannah Durant MD;  [facility]     AMPUTATE REVISION STUMP LOWER EXTREMITY Right 11/16/2016    Procedure: AMPUTATE REVISION STUMP LOWER EXTREMITY;  Surgeon: Savannah Durant MD;  [facility]     AMPUTATE TOE(S) Right 1/5/2016    Procedure: AMPUTATE TOE(S);  Surgeon: Mello Gaines DPM;  Location: [facility]     ANGIOGRAM Bilateral 11/21/2014    Procedure: ANGIOGRAM;  Surgeon: Savannah Durant MD;  [facility]     ANGIOGRAM Left 1/16/2015    Procedure: ANGIOGRAM;  Surgeon: Savannah Durant MD;  [facility]     ANGIOGRAM Bilateral 9/14/2015    Procedure: ANGIOGRAM;  Surgeon: Savannah Durant MD;  [facility]     ANGIOGRAM Left 10/12/2015    Procedure: ANGIOGRAM;  Surgeon: Savannah Durant MD;  [facility]     ANGIOGRAM Right 6/6/2016    Procedure: ANGIOGRAM;  Surgeon: Savannah Durant MD;  [facility]     ANGIOPLASTY Right 6/6/2016    Procedure: ANGIOPLASTY;  Surgeon: Savannah Durant MD;  [facility]     APPENDECTOMY       BREAST SURGERY      right breast bx (benign)     CATARACT IOL, RT/LT Right      CHOLECYSTECTOMY       COLONOSCOPY N/A 8/25/2014    Procedure: COLONOSCOPY;  Surgeon: Mello Ferrer MD;  [facility]     COLONOSCOPY WITH CO2 INSUFFLATION N/A 8/20/2014    Procedure: COLONOSCOPY WITH CO2 INSUFFLATION;  Surgeon: Duane, William Charles, MD;  Location: [facility]     CYSTOSTOMY, INSERT TUBE SUPRAPUBIC, COMBINED N/A 1/16/2018    Procedure: COMBINED CYSTOSTOMY,  INSERT TUBE SUPRAPUBIC;  Cystoscopy, Intraoperative Ultrasound, Suprapubic Tube Placement;  Surgeon: Keanu Dawson MD;  [facility]     ENDARTERECTOMY FEMORAL  5/23/2014    Procedure: ENDARTERECTOMY FEMORAL;  Surgeon: Jason Joshi MD;  [facility]     ESOPHAGOSCOPY, GASTROSCOPY, DUODENOSCOPY (EGD), COMBINED  12/14/2012    Procedure: COMBINED ESOPHAGOSCOPY, GASTROSCOPY, DUODENOSCOPY (EGD), BIOPSY SINGLE OR MULTIPLE;  ESOPHAGOSCOPY, GASTROSCOPY, DUODENOSCOPY (EGD), DILATATION ;  Surgeon: Elizabeth Stevenson MD;  Location:  GI     ESOPHAGOSCOPY, GASTROSCOPY, DUODENOSCOPY (EGD), COMBINED  12/31/2013    Procedure: COMBINED ESOPHAGOSCOPY, GASTROSCOPY, DUODENOSCOPY (EGD), BIOPSY SINGLE OR MULTIPLE;;  Surgeon: Clemente Lopez MD;  [facility]     ESOPHAGOSCOPY, GASTROSCOPY, DUODENOSCOPY (EGD), COMBINED  4/1/2014    Procedure: COMBINED ESOPHAGOSCOPY, GASTROSCOPY, DUODENOSCOPY (EGD);;  Surgeon: Clemente Lopez MD;  Location:  GI     ESOPHAGOSCOPY, GASTROSCOPY, DUODENOSCOPY (EGD), COMBINED  6/28/2014    Procedure: COMBINED ESOPHAGOSCOPY, GASTROSCOPY, DUODENOSCOPY (EGD);  Surgeon: Clemente Lopez MD;  Location:  GI     ESOPHAGOSCOPY, GASTROSCOPY, DUODENOSCOPY (EGD), COMBINED N/A 8/20/2014    Procedure: COMBINED ESOPHAGOSCOPY, GASTROSCOPY, DUODENOSCOPY (EGD), BIOPSY SINGLE OR MULTIPLE;  Surgeon: Duane, William Charles, MD;  Location:  OR     ESOPHAGOSCOPY, GASTROSCOPY, DUODENOSCOPY (EGD), COMBINED N/A 8/22/2014    Procedure: COMBINED ESOPHAGOSCOPY, GASTROSCOPY, DUODENOSCOPY (EGD), BIOPSY SINGLE OR MULTIPLE;  Surgeon: Mello Ferrer MD;  [facility]     ESOPHAGOSCOPY, GASTROSCOPY, DUODENOSCOPY (EGD), COMBINED N/A 10/2/2014    Procedure: COMBINED ESOPHAGOSCOPY, GASTROSCOPY, DUODENOSCOPY (EGD), BIOPSY SINGLE OR MULTIPLE;  Surgeon: Remy Haskins MD;  Location:  GI     ESOPHAGOSCOPY, GASTROSCOPY, DUODENOSCOPY (EGD), COMBINED Left 12/15/2014    Procedure: COMBINED ESOPHAGOSCOPY,  GASTROSCOPY, DUODENOSCOPY (EGD), BIOPSY SINGLE OR MULTIPLE;  Surgeon: Remy Haskins MD;  [facility]     ESOPHAGOSCOPY, GASTROSCOPY, DUODENOSCOPY (EGD), COMBINED N/A 2/25/2015    Procedure: COMBINED ENDOSCOPIC ULTRASOUND, ESOPHAGOSCOPY, GASTROSCOPY, DUODENOSCOPY (EGD), FINE NEEDLE ASPIRATE/BIOPSY;  Surgeon: Clemente Lugo MD;  [facility]     ESOPHAGOSCOPY, GASTROSCOPY, DUODENOSCOPY (EGD), COMBINED Left 2/25/2015    Procedure: COMBINED ESOPHAGOSCOPY, GASTROSCOPY, DUODENOSCOPY (EGD), BIOPSY SINGLE OR MULTIPLE;  Surgeon: Clemente Lugo MD;  [facility]     ESOPHAGOSCOPY, GASTROSCOPY, DUODENOSCOPY (EGD), COMBINED N/A 9/25/2016    Procedure: COMBINED ESOPHAGOSCOPY, GASTROSCOPY, DUODENOSCOPY (EGD);  Surgeon: Aziza Patiño MD;  [facility]     ESOPHAGOSCOPY, GASTROSCOPY, DUODENOSCOPY (EGD), COMBINED N/A 1/18/2017    Procedure: COMBINED ESOPHAGOSCOPY, GASTROSCOPY, DUODENOSCOPY (EGD), BIOPSY SINGLE OR MULTIPLE;  Surgeon: Clemente Lopez MD;  [facility]     ESOPHAGOSCOPY, GASTROSCOPY, DUODENOSCOPY (EGD), COMBINED N/A 11/26/2017    Procedure: COMBINED ESOPHAGOSCOPY, GASTROSCOPY, DUODENOSCOPY (EGD), REMOVE FOREIGN BODY;  Esophagogastroduodenoscopy with foreign body extraction  ;  Surgeon: Herberth Castrejon MD;  [facility]     ESOPHAGOSCOPY, GASTROSCOPY, DUODENOSCOPY (EGD), COMBINED N/A 11/26/2017    Procedure: COMBINED ESOPHAGOSCOPY, GASTROSCOPY, DUODENOSCOPY (EGD), REMOVE FOREIGN BODY;;  Surgeon: Herberth Castrejon MD;  [facility]     FASCIOTOMY LOWER EXTREMITY Left 6/10/2016    Procedure: FASCIOTOMY LOWER EXTREMITY;  Surgeon: Mello Rodriguez MD;  [facility]     HC CAPSULE ENDOSCOPY N/A 8/25/2014    Procedure: CAPSULE/PILL CAM ENDOSCOPY;  Surgeon: Remy Haskins MD;  [facility]     HC CAPSULE ENDOSCOPY N/A 10/2/2014    Procedure: CAPSULE/PILL CAM ENDOSCOPY;  Surgeon: Remy Haskins MD;  Location:  GI     ORTHOPEDIC SURGERY      broken  wrist repair     SEX TRANSFORMATION SURGERY, MALE TO FEMALE      1974     SINUS SURGERY      cyst removed     TONSILLECTOMY       VASCULAR SURGERY      Left carotid stent       Social History   Substance Use Topics     Smoking status: Current Every Day Smoker     Packs/day: 0.25     Years: 30.00     Types: Cigarettes, Cigars     Last attempt to quit: 11/1/2001     Smokeless tobacco: Never Used     Alcohol use No     Family History   Problem Relation Age of Onset     Dementia Mother      Glaucoma Mother      Diabetes Mother      may have been type 1, childhood     Coronary Artery Disease Mother      MI     Glaucoma Father      Diabetes Father      may hev been type 1 - ??     Heart Failure Father      Cancer Maternal Aunt      leukemia     Schizophrenia Brother      Depression Brother      Suicide Sister      Depression Sister      Diabetes Sister      Cancer Maternal Aunt      ovarian     Glaucoma Maternal Grandmother      Diabetes Maternal Grandmother      Glaucoma Maternal Grandfather      Diabetes Maternal Grandfather      Glaucoma Paternal Grandmother      Diabetes Paternal Grandmother      Glaucoma Paternal Grandfather      Diabetes Paternal Grandfather      Breast Cancer Sister      Cerebrovascular Disease Brother      Colon Cancer No family hx of      Macular Degeneration No family hx of          Current Outpatient Prescriptions   Medication Sig Dispense Refill     ACETAMINOPHEN PO Take 1,000 mg by mouth every 4 hours as needed for pain Not to exceed 4000 mg/day       ADVAIR DISKUS 250-50 MCG/DOSE diskus inhaler Inhale 1 puff into the lungs 2 times daily 60 Inhaler 11     albuterol (2.5 MG/3ML) 0.083% neb solution INHALE 1 VIAL VIA NEBULIZER EVERY 6 HOURS AS NEEDED 360 mL 11     aspirin EC 81 MG EC tablet Take 1 tablet (81 mg) by mouth daily 90 tablet 3     atorvastatin (LIPITOR) 40 MG tablet Take 1 tablet (40 mg) by mouth daily 90 tablet 2     blood glucose monitoring (ONE TOUCH ULTRA 2) meter device kit Use  to test blood sugars 3 times daily or as directed. 1 kit 0     blood glucose monitoring (ONE TOUCH ULTRA) test strip Use to test blood sugars 3 times daily or as directed. 3 Box 3     blood glucose monitoring (ONE TOUCH ULTRASOFT) lancets Use to test blood sugar 3 times daily or as directed. 100 each PRN     brimonidine (ALPHAGAN) 0.2 % ophthalmic solution Place 1 drop into the right eye 2 times daily 1 Bottle 11     calcium citrate-vitamin D (CITRACAL) 315-250 MG-UNIT TABS Take 2 tablets by mouth daily 120 tablet 5     cetirizine (ZYRTEC) 10 MG tablet Take 1 tablet (10 mg) by mouth daily as needed for allergies 90 tablet 3     Cholecalciferol (VITAMIN D) 2000 UNITS tablet Take 2,000 Units by mouth daily. 100 tablet 3     ciprofloxacin (CIPRO) 500 MG tablet Take 1 tablet (500 mg) by mouth 2 times daily 14 tablet 0     CYANOCOBALAMIN PO Take 2,000 mcg by mouth daily       diclofenac (VOLTAREN) 1 % GEL topical gel Apply 2 g topically 4 times daily to hands 100 g 11     dorzolamide (TRUSOPT) 2 % ophthalmic solution Place 1 drop into the right eye 2 times daily 1 Bottle 11     EPINEPHrine (EPIPEN/ADRENACLICK/OR ANY BX GENERIC EQUIV) 0.3 MG/0.3ML injection 2-pack Inject 0.3 mLs (0.3 mg) into the muscle as needed for anaphylaxis 0.6 mL 1     escitalopram (LEXAPRO) 10 MG tablet Take 10 mg by mouth daily        ESTRACE VAGINAL 0.1 MG/GM cream USE DIME SIZE AMOUNT 3X A WEEK AT NIGHT 42.5 g 11     ferrous sulfate (IRON) 325 (65 FE) MG tablet Take 1 tablet (325 mg) by mouth 2 times daily With meals 60 tablet 2     fluticasone (FLONASE) 50 MCG/ACT nasal spray Spray 1 spray into both nostrils daily       hydrochlorothiazide (MICROZIDE) 12.5 MG capsule Take 1 capsule (12.5 mg) by mouth daily 90 capsule 2     lactulose (CHRONULAC) 10 GM/15ML solution Take 20 g by mouth as needed for constipation       latanoprost (XALATAN) 0.005 % ophthalmic solution Place 1 drop into both eyes At Bedtime 2.5 mL 11     levETIRAcetam (KEPPRA) 500  MG tablet Take 1 tablet (500 mg) by mouth 2 times daily 180 tablet 1     lidocaine (LIDODERM) 5 % Patch APPLY 1 TO 3 PATCHES TO PAINFUL AREA AT ONCE FOR UP TO 12 HOURS WITHIN A 24 HOUR PERIOD REMOVE AFTER 12 HOURS 30 patch 5     lisinopril (PRINIVIL/ZESTRIL) 20 MG tablet Take 1 tablet (20 mg) by mouth daily 90 tablet 0     MEDICATION GIVEN BY INTRATHECAL PUMP - INSTRUCTION continuous 2/8/18: Pump managed by [facility] in [place] [phone].   Conc: Bupivacaine 20 mg/mL and Fentanyl 2000 mcg/mL, morphine 2 mg/mL  Continuous:  Fentanyl 796 mcg/day, Bupivacaine 7.96 mg/day, Morphine 0.796 mg/day   Boluses: Up to 7 boluses per 24-hr period of Bupivicaine 0.5994 mg and Fentanyl 59.9 mcg morphine 0.0599 mg.  Pump Refill Date 02/28/18       metFORMIN (GLUCOPHAGE-XR) 500 MG 24 hr tablet Take 1 tablet (500 mg) by mouth daily (with dinner) 90 tablet 3     metoprolol succinate (TOPROL-XL) 50 MG 24 hr tablet Take 1 tablet (50 mg) by mouth daily 90 tablet 0     Multiple Vitamin (MULTIVITAMIN OR) Take 1 tablet by mouth daily        nicotine (NICODERM CQ) 14 MG/24HR 24 hr patch Place 1 patch onto the skin daily 30 patch 0     nitroglycerin (NITROSTAT) 0.4 MG SL tablet Place 1 tablet (0.4 mg) under the tongue every 5 minutes as needed for chest pain if you are still having symptoms after 3 doses (15 minutes) call 911. 25 tablet 1     ondansetron (ZOFRAN-ODT) 8 MG ODT tab Take 1 tablet (8 mg) by mouth every 8 hours as needed 30 tablet 5     order for DME Equipment being ordered: bandage tape, prefer paper 1 Package 0     order for DME Full electric hospital bed with half rails    Dx: M72785, I110, J449  Length of need: lifetime 1 Device 0     order for DME Wheel Chair Cushion: 18 x 18 inch Roho cushion 1 Device 0     order for DME Hospital bed with side rails 1 Device 0     order for DME Equipment being ordered: CPAP supplies mask, hose, filters, cushion    fax to [facility] at [phone]  Device prn     order for DME Equipment being ordered: CPAP supplies mask, hose, filters, cushion fax to [facility] at [phone]  Disp: 10 Device Refills: prn   Class: Local Print Start: 2/10/2017 1 Device 0     order for DME Equipment being ordered: Nebulizer and tubing supplies 1 Units 0     order for DME Equipment being ordered: Glucerna daily shakes with each meal 1 Box 11     ORDER FOR DME Equipment being ordered: Power Wheelchair 1 Device 0     ORDER FOR DME Equipment being ordered: Depends briefs (Patient not taking: Reported on 6/16/2018) 1 Month 11     oxyCODONE IR (ROXICODONE) 5 MG tablet Take 1 tablet (5 mg) by mouth every 6 hours as needed for pain 5 tablet 0     pantoprazole (PROTONIX) 40 MG EC tablet Take 1 tablet (40 mg) by mouth daily 30 tablet 5     phenytoin 200 MG CAPS Take 200 mg by mouth 2 times daily (Patient taking differently: Take 200 mg by mouth 2 times daily (takes at 8 AM and 8 PM)) 60 capsule 0     polyethylene glycol (MIRALAX/GLYCOLAX) Packet Take 17 g by mouth daily Dissolved in water or juice        pregabalin (LYRICA) 75 MG capsule Take 2 capsules (150 mg) by mouth 2 times daily 120 capsule 5     risperiDONE (RISPERDAL) 0.5 MG tablet Take 0.5 mg by mouth At Bedtime       sucralfate (CARAFATE) 1 GM tablet Take 1 tablet (1 g) by mouth 4 times daily May dissolve in 10 mL water is necessary. (Start upon completion of carafate suspension) 120 tablet 11     sulfamethoxazole-trimethoprim (BACTRIM DS/SEPTRA DS) 800-160 MG per tablet Take 1 tablet by mouth 2 times daily for 7 days 14 tablet 0     tetracycline (ACHROMYCIN/SUMYCIN) 500 MG capsule Take 1 capsule (500 mg) by mouth 2 times daily 28 capsule 0     traZODone (DESYREL) 50 MG tablet Take 150 mg by mouth At Bedtime        umeclidinium (INCRUSE ELLIPTA) 62.5 MCG/INH oral inhaler Inhale 1 puff into the lungs daily       VENTOLIN  (90 BASE) MCG/ACT Inhaler Inhale 2 puffs into the lungs every 6 hours as needed for shortness of  breath / dyspnea or wheezing 3 Inhaler 5     zinc 50 MG TABS Take 1 tablet by mouth daily       Allergies   Allergen Reactions     Bee Venom      Penicillins Anaphylaxis     Other reaction(s): Skin Rash and/or Hives     Dilantin [Phenytoin] Other (See Comments)     Generic dilantin only per pt     Iodide Hives     09/11/15: Pt states she can use iodine and doesn't have any problems      Iodine-131      Novocaine [Procaine] Hives     Other reaction(s): Skin Rash and/or Hives     Tositumomab      BP Readings from Last 3 Encounters:   06/16/18 108/62   06/12/18 90/60   06/06/18 112/64    Wt Readings from Last 3 Encounters:   06/12/18 138 lb (62.6 kg)   06/06/18 138 lb (62.6 kg)   05/17/18 138 lb (62.6 kg)         Reviewed and updated as needed this visit by clinical staff       Reviewed and updated as needed this visit by Provider         ROS:  CONSTITUTIONAL: NEGATIVE for fever, chills, change in weight  ENT/MOUTH: NEGATIVE for ear, mouth and throat problems  RESP: NEGATIVE for significant cough or SOB  CV: NEGATIVE for chest pain, palpitations or peripheral edema  GI: NEGATIVE for nausea, abdominal pain, heartburn, or change in bowel habits  : NEGATIVE for frequency, dysuria, or hematuria  MUSCULOSKELETAL: NEGATIVE for significant arthralgias or myalgia  NEURO: NEGATIVE for weakness, dizziness or paresthesias  HEME: NEGATIVE for bleeding problems  PSYCHIATRIC: NEGATIVE for changes in mood or affect    OBJECTIVE:                                                    /64  Pulse 64  Temp 98.4  F (36.9  C) (Oral)  Resp 15  Wt 138 lb (62.6 kg)  SpO2 98%  BMI 52.47 kg/m2  Body mass index is 52.47 kg/(m^2).  GENERAL: alert and no distress  EYES: Eyes grossly normal to inspection, extraocular movements - intact, and PERRL  HENT: ear canals- normal; TMs- normal; Nose- normal; Mouth- no ulcers, no lesions  NECK: no tenderness, no adenopathy, no asymmetry, no masses, no stiffness; thyroid- normal to palpation  RESP:  lungs clear to auscultation - no rales, no rhonchi, no wheezes  CV: regular rates and rhythm, normal S1 S2, no S3 or S4 and no click or rub   PSYCH: Alert and oriented times 3; speech- coherent , normal rate and volume; able to articulate logical thoughts, able to abstract reason, no tangential thoughts, no hallucinations or delusions, affect- normal  Wheelchair noted, bilateral BKA's noted       ASSESSMENT/PLAN:                                                      (M81.0) Age-related osteoporosis without current pathological fracture  (primary encounter diagnosis)  Comment: I discussed and reviewed the bone density scan results with the patient and reviewed the benefits and risks and potential side effects of treating.  She is interested in treating would like to proceed therefore we will start with weekly dosing Fosamax along with supplemental calcium and vitamin D as advised  Plan: alendronate (FOSAMAX) 70 MG tablet        Instructions on Fosamax use and side effects - particularly esophageal adverse events - are carefully reviewed with her. This drug must be taken upon arising for the day on an empty stomach, with a large 6-8 ounce glass of water; she must remain NPO in the upright position for at least 30 minutes afterwards and until after the first food of the day. If esophageal irritation is noted, she will stop the drug and call my office.      (N32.89) Bladder spasm  Comment: Noted recent urinary tract infection treated with Cipro with cultures now sensitive to Bactrim.  Plan: Scription has been sent for alternative therapy to her pharmacy and she will pick this up accordingly.      (I69.636) CVA, old, hemiparesis (H)  Comment: No residual changes at present  Plan:     Of note the patient will be moving indefinitely to [place] within the month and she has been advised that she needs to get medical records transferred and follow-up with her primary care physician in [place] for further assessment.  See Patient  Instructions    Allan Casey MD  [facility]    THE MEDICATION LIST HAS BEEN FULLY RECONCILED BY THE M.D. AND THE NURSING STAFF.    25 minutes spent with this patient, face to face, discussing treatment options for listed problems above as well as side effects of appropriate medications.  Counseling time extended beyond 50% of the clinic visit.  Medication dosing, treatment plan and follow-up were discussed. Also reviewed need for primary care testing for patient.

## 2018-06-25 NOTE — MR AVS SNAPSHOT
After Visit Summary   6/25/2018    Sonya Foote    MRN: [id number]           Patient Information     Date Of Birth          1950        Visit Information        Provider Department      6/25/2018 11:20 AM Allan Casey MD [facility]        Today's Diagnoses     Age-related osteoporosis without current pathological fracture    -  1    Bladder spasm        CVA, old, hemiparesis (H)           Follow-ups after your visit        Follow-up notes from your care team     Return if symptoms worsen or fail to improve.      Your next 10 appointments already scheduled     Jun 26, 2018 10:45 AM CDT   Return Visit with Bj Anderson MD   [facility] ([facility])    [address]  [place] [address] [phone] OPT 2            Jul 05, 2018  9:45 AM CDT   (Arrive by 9:30 AM)   Return Visit with Elizabeth Oliver MD   [facility] ([facility] [place])    [address]  4th Floor  [place] [id number] [phone]            Jul 26, 2018 10:15 AM CDT   RETURN GLAUCOMA with Marely Robin MD   [facility] ([facility])    [address]  [address]  [place] [facility] [address] [phone]            Aug 13, 2018  9:00 AM CDT   (Arrive by 8:45 AM)   Return Visit with Alina Jennings MD   [facility] ([facility] [place])    [address]  [address]  [place] [id number] [phone]              Who to contact     If you have questions or need follow up information about today's clinic visit or your schedule please contact [facility] directly at [phone].  Normal or non-critical lab and imaging results will be communicated to you by MyChart, letter or phone within 4 business days after  the clinic has received the results. If you do not hear from us within 7 days, please contact the clinic through Re[employer] or phone. If you have a critical or abnormal lab result, we will notify you by phone as soon as possible.  Submit refill requests through [employer] or call your pharmacy and they will forward the refill request to us. Please allow 3 business days for your refill to be completed.          Additional Information About Your Visit        Care EveryWhere ID     This is your Care EveryWhere ID. This could be used by other organizations to access your [place] medical records  [id number]        Your Vitals Were     Pulse Temperature Respirations Pulse Oximetry BMI (Body Mass Index)       64 98.4  F (36.9  C) (Oral) 15 98% 52.47 kg/m2        Blood Pressure from Last 3 Encounters:   06/25/18 100/64   06/16/18 108/62   06/12/18 90/60    Weight from Last 3 Encounters:   06/25/18 138 lb (62.6 kg)   06/12/18 138 lb (62.6 kg)   06/06/18 138 lb (62.6 kg)              Today, you had the following     No orders found for display         Today's Medication Changes          These changes are accurate as of 6/25/18 11:41 AM.  If you have any questions, ask your nurse or doctor.               Start taking these medicines.        Dose/Directions    alendronate 70 MG tablet   Commonly known as:  FOSAMAX   Used for:  Age-related osteoporosis without current pathological fracture   Started by:  Allan Casey MD        Take 1 tablet (70 mg) by mouth with 8oz water every 7 days 30 minutes before breakfast and remain upright during this time.   Quantity:  12 tablet   Refills:  3         These medicines have changed or have updated prescriptions.        Dose/Directions    Phenytoin Sodium Extended 200 MG Caps   This may have changed:  additional instructions   Used for:  Seizure disorder (H)        Dose:  200 mg   Take 200 mg by mouth 2 times daily   Quantity:  60 capsule   Refills:  0            Where to get your  medicines      These medications were sent to [place], [place] - [address]  [address] [facility] [address]     Phone:  [phone]     alendronate 70 MG tablet                Primary Care Provider Office Phone # Fax #    Allan Casey [phone]       [address]  [facility] [address]        Equal Access to Services     KARI CARREON : Hadii aad ku hadasho Soomaali, waaxda luqadaha, qaybta kaalmada adeegyada, waxay idiin hayaan adeeg kharash la'tuan . So [facility] [phone].    ATENCIÓN: Si habla español, tiene a briones disposición servicios gratuitos de asistencia lingüística. Llame al [phone].    We comply with applicable federal civil rights laws and [place] laws. We do not discriminate on the basis of race, color, national origin, age, disability, sex, sexual orientation, or gender identity.            Thank you!     Thank you for choosing [facility]  for your care. Our goal is always to provide you with excellent care. Hearing back from our patients is one way we can continue to improve our services. Please take a few minutes to complete the written survey that you may receive in the mail after your visit with us. Thank you!             Your Updated Medication List - Protect others around you: Learn how to safely use, store and throw away your medicines at www.disposemymeds.org.          This list is accurate as of 6/25/18 11:41 AM.  Always use your most recent med list.                   Brand Name Dispense Instructions for use Diagnosis    ACETAMINOPHEN PO      Take 1,000 mg by mouth every 4 hours as needed for pain Not to exceed 4000 mg/day        ADVAIR DISKUS 250-50 MCG/DOSE diskus inhaler   Generic drug:  fluticasone-salmeterol     60 Inhaler    Inhale 1 puff into the lungs 2 times daily    Acute bronchitis       * albuterol (2.5 MG/3ML) 0.083% neb solution     360 mL    INHALE 1 VIAL VIA NEBULIZER EVERY 6  HOURS AS NEEDED    Chronic obstructive pulmonary disease, unspecified COPD type (H)       * VENTOLIN  (90 Base) MCG/ACT Inhaler   Generic drug:  albuterol     3 Inhaler    Inhale 2 puffs into the lungs every 6 hours as needed for shortness of breath / dyspnea or wheezing    Chronic obstructive pulmonary disease, unspecified COPD type (H)       alendronate 70 MG tablet    FOSAMAX    12 tablet    Take 1 tablet (70 mg) by mouth with 8oz water every 7 days 30 minutes before breakfast and remain upright during this time.    Age-related osteoporosis without current pathological fracture       aspirin 81 MG EC tablet     90 tablet    Take 1 tablet (81 mg) by mouth daily    Unstable angina (H)       atorvastatin 40 MG tablet    LIPITOR    90 tablet    Take 1 tablet (40 mg) by mouth daily    Hyperlipidemia LDL goal <100       blood glucose monitoring lancets     100 each    Use to test blood sugar 3 times daily or as directed.    Type 2 diabetes mellitus with diabetic peripheral angiopathy without gangrene, without long-term current use of insulin (H)       blood glucose monitoring meter device kit     1 kit    Use to test blood sugars 3 times daily or as directed.    Type 2 diabetes, HbA1C goal < 8% (H)       blood glucose monitoring test strip    ONETOUCH ULTRA    3 Box    Use to test blood sugars 3 times daily or as directed.    Type 2 diabetes mellitus with diabetic peripheral angiopathy without gangrene, without long-term current use of insulin (H)       brimonidine 0.2 % ophthalmic solution    ALPHAGAN    1 Bottle    Place 1 drop into the right eye 2 times daily    Primary open angle glaucoma of both eyes, severe stage       calcium citrate-vitamin D 315-250 MG-UNIT Tabs per tablet    CITRACAL    120 tablet    Take 2 tablets by mouth daily    Osteoporosis       cetirizine 10 MG tablet    zyrTEC    90 tablet    Take 1 tablet (10 mg) by mouth daily as needed for allergies    Seasonal allergic rhinitis, unspecified  allergic rhinitis trigger       CYANOCOBALAMIN PO      Take 2,000 mcg by mouth daily        diclofenac 1 % Gel topical gel    VOLTAREN    100 g    Apply 2 g topically 4 times daily to hands    Primary osteoarthritis of both hands       dorzolamide 2 % ophthalmic solution    TRUSOPT    1 Bottle    Place 1 drop into the right eye 2 times daily    Primary open angle glaucoma of both eyes, severe stage       EPINEPHrine 0.3 MG/0.3ML injection 2-pack    EPIPEN/ADRENACLICK/or ANY BX GENERIC EQUIV    0.6 mL    Inject 0.3 mLs (0.3 mg) into the muscle as needed for anaphylaxis    Bee sting reaction, undetermined intent, subsequent encounter       escitalopram 10 MG tablet    LEXAPRO     Take 10 mg by mouth daily        ESTRACE VAGINAL 0.1 MG/GM cream   Generic drug:  estradiol     42.5 g    USE DIME SIZE AMOUNT 3X A WEEK AT NIGHT    Atrophic vaginitis       ferrous sulfate 325 (65 Fe) MG tablet    IRON    60 tablet    Take 1 tablet (325 mg) by mouth 2 times daily With meals        fluticasone 50 MCG/ACT spray    FLONASE     Spray 1 spray into both nostrils daily        hydrochlorothiazide 12.5 MG capsule    MICROZIDE    90 capsule    Take 1 capsule (12.5 mg) by mouth daily    Essential hypertension with goal blood pressure less than 130/80       INCRUSE ELLIPTA 62.5 MCG/INH oral inhaler   Generic drug:  umeclidinium      Inhale 1 puff into the lungs daily        lactulose 10 GM/15ML solution    CHRONULAC     Take 20 g by mouth as needed for constipation        latanoprost 0.005 % ophthalmic solution    XALATAN    2.5 mL    Place 1 drop into both eyes At Bedtime    Primary open angle glaucoma of both eyes, severe stage       levETIRAcetam 500 MG tablet    KEPPRA    180 tablet    Take 1 tablet (500 mg) by mouth 2 times daily    Nausea       lidocaine 5 % Patch    LIDODERM    30 patch    APPLY 1 TO 3 PATCHES TO PAINFUL AREA AT ONCE FOR UP TO 12 HOURS WITHIN A 24 HOUR PERIOD REMOVE AFTER 12 HOURS    Chronic pain syndrome, Status  post below knee amputation of right lower extremity (H)       lisinopril 20 MG tablet    PRINIVIL/ZESTRIL    90 tablet    Take 1 tablet (20 mg) by mouth daily    History of coronary artery disease       MEDICATION GIVEN BY INTRATHECAL PUMP - INSTRUCTION      continuous 2/8/18: Pump managed by [facility] in [place] [phone].  Conc: Bupivacaine 20 mg/mL and Fentanyl 2000 mcg/mL, morphine 2 mg/mL Continuous:  Fentanyl 796 mcg/day, Bupivacaine 7.96 mg/day, Morphine 0.796 mg/day  Boluses: Up to 7 boluses per 24-hr period of Bupivicaine 0.5994 mg and Fentanyl 59.9 mcg morphine 0.0599 mg. Pump Refill Date 02/28/18        metFORMIN 500 MG 24 hr tablet    GLUCOPHAGE-XR    90 tablet    Take 1 tablet (500 mg) by mouth daily (with dinner)    Type 2 diabetes mellitus with diabetic peripheral angiopathy and gangrene, without long-term current use of insulin (H)       metoprolol succinate 50 MG 24 hr tablet    TOPROL-XL    90 tablet    Take 1 tablet (50 mg) by mouth daily    History of coronary artery disease       MULTIVITAMIN PO      Take 1 tablet by mouth daily        nicotine 14 MG/24HR 24 hr patch    NICODERM CQ    30 patch    Place 1 patch onto the skin daily    Tobacco dependence syndrome       nitroGLYcerin 0.4 MG sublingual tablet    NITROSTAT    25 tablet    Place 1 tablet (0.4 mg) under the tongue every 5 minutes as needed for chest pain if you are still having symptoms after 3 doses (15 minutes) call 911.    Chronic systolic congestive heart failure (H), Old myocardial infarction       ondansetron 8 MG ODT tab    ZOFRAN-ODT    30 tablet    Take 1 tablet (8 mg) by mouth every 8 hours as needed    Other migraine without status migrainosus, not intractable       order for DME     1 Month    Equipment being ordered: Depends briefs    Incontinence       order for DME     1 Device    Equipment being ordered: Power Wheelchair    CVA (cerebral infarction), HTN (hypertension)       order for DME      1 Box    Equipment being ordered: Glucerna daily shakes with each meal    Type 2 diabetes mellitus with other diabetic neurological complication       * order for DME     1 Units    Equipment being ordered: Nebulizer and tubing supplies    Simple chronic bronchitis (H)       * order for DME     1 Device    Equipment being ordered: CPAP supplies mask, hose, filters, cushion fax to [facility] at [phone] Disp: 10 DeviceRefills: prn Class: Local PrintStart: 2/10/2017    Chronic obstructive pulmonary disease, unspecified COPD type (H)       * order for DME     10 Device    Equipment being ordered: CPAP supplies mask, hose, filters, cushion  fax to [facility] at [phone]    COPD (chronic obstructive pulmonary disease) (H)       * order for DME     1 Device    Hospital bed with side rails    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 (H)       * order for DME     1 Device    Full electric hospital bed with half rails  Dx: K40557, I110, J449 Length of need: lifetime    Status post bilateral above knee amputation (H)       * order for DME     1 Device    Wheel Chair Cushion: 18 x 18 inch Roho cushion    Status post bilateral above knee amputation (H)       order for DME     1 Package    Equipment being ordered: bandage tape, prefer paper    Burn of skin       oxyCODONE IR 5 MG tablet    ROXICODONE    5 tablet    Take 1 tablet (5 mg) by mouth every 6 hours as needed for pain    Bladder spasm       pantoprazole 40 MG EC tablet    PROTONIX    30 tablet    Take 1 tablet (40 mg) by mouth daily    Gastroesophageal reflux disease without esophagitis       Phenytoin Sodium Extended 200 MG Caps     60 capsule    Take 200 mg by mouth 2 times daily    Seizure disorder (H)       polyethylene glycol Packet    MIRALAX/GLYCOLAX     Take 17 g by mouth daily Dissolved in water or juice        pregabalin 75 MG capsule    LYRICA    120 capsule    Take 2 capsules (150 mg) by mouth 2 times daily    Pain in  both upper extremities       risperiDONE 0.5 MG tablet    risperDAL     Take 0.5 mg by mouth At Bedtime        sucralfate 1 GM tablet    CARAFATE    120 tablet    Take 1 tablet (1 g) by mouth 4 times daily May dissolve in 10 mL water is necessary. (Start upon completion of carafate suspension)    Adjustment disorder with depressed mood       sulfamethoxazole-trimethoprim 800-160 MG per tablet    BACTRIM DS/SEPTRA DS    14 tablet    Take 1 tablet by mouth 2 times daily for 7 days    E-coli UTI       tetracycline 500 MG capsule    ACHROMYCIN/SUMYCIN    28 capsule    Take 1 capsule (500 mg) by mouth 2 times daily    Dysuria       traZODone 50 MG tablet    DESYREL     Take 150 mg by mouth At Bedtime        vitamin D 2000 units tablet     100 tablet    Take 2,000 Units by mouth daily.        zinc 50 MG Tabs      Take 1 tablet by mouth daily        * Notice:  This list has 8 medication(s) that are the same as other medications prescribed for you. Read the directions carefully, and ask your doctor or other care provider to review them with you.

## 2018-06-26 ENCOUNTER — OFFICE VISIT (OUTPATIENT)
Dept: CARDIOLOGY | Facility: CLINIC | Age: 68
End: 2018-06-26
Attending: INTERNAL MEDICINE
Payer: COMMERCIAL

## 2018-06-26 ENCOUNTER — PRE VISIT (OUTPATIENT)
Dept: UROLOGY | Facility: CLINIC | Age: 68
End: 2018-06-26

## 2018-06-26 VITALS
WEIGHT: 138 LBS | OXYGEN SATURATION: 92 % | HEART RATE: 75 BPM | DIASTOLIC BLOOD PRESSURE: 58 MMHG | BODY MASS INDEX: 52.47 KG/M2 | SYSTOLIC BLOOD PRESSURE: 92 MMHG

## 2018-06-26 DIAGNOSIS — I50.22 CHRONIC SYSTOLIC CONGESTIVE HEART FAILURE (H): ICD-10-CM

## 2018-06-26 DIAGNOSIS — Z72.0 TOBACCO ABUSE: ICD-10-CM

## 2018-06-26 DIAGNOSIS — I25.118 CORONARY ARTERY DISEASE OF NATIVE ARTERY OF NATIVE HEART WITH STABLE ANGINA PECTORIS (H): Primary | ICD-10-CM

## 2018-06-26 DIAGNOSIS — I25.2 OLD MYOCARDIAL INFARCTION: ICD-10-CM

## 2018-06-26 DIAGNOSIS — I10 ESSENTIAL HYPERTENSION WITH GOAL BLOOD PRESSURE LESS THAN 130/80: ICD-10-CM

## 2018-06-26 DIAGNOSIS — E78.5 HYPERLIPIDEMIA LDL GOAL <70: ICD-10-CM

## 2018-06-26 PROCEDURE — 99214 OFFICE O/P EST MOD 30 MIN: CPT | Performed by: INTERNAL MEDICINE

## 2018-06-26 NOTE — LETTER
6/26/2018      Allan Casey MD  [address] [facility] [address]      RE: Sonya Foote       Dear Colleague,    I had the pleasure of seeing Sonya Foote in the [facility].    Service Date: 06/26/2018      PRIMARY CARE PHYSICIAN:  Dr. Allan Casey.      HISTORY OF PRESENT ILLNESS:  I again had the pleasure of seeing your patient, Sonya Foote, at [facility] for evaluation of coronary artery disease and panvascular disease.  Sonya Foote is a pleasant 68-year-old -American female with a history of chronic dysphagia, esophageal strictures and previous dilatations.  She underwent a left above-the-knee amputation for peripheral vascular disease and thrombosis on 06/06/2016 and a right above-the-knee amputation for peripheral arterial disease on 11/23/2016.  The patient presented for unstable angina and inferior wall myocardial infarction to the [facility] in 09/2016.  Her ejection fraction by cardiac MRI in June had been 60%.  She underwent coronary angiography in 09/2016 with high-grade stenosis of the proximal right coronary artery.  Intracoronary stenting was performed.  Her ejection fraction fell to 35%-40%.  She has remained on aspirin and Plavix since that time.  She has intermittent episodes of substernal chest pressure relieved with sublingual nitroglycerin.  The patient had a hemorrhagic stroke and kidney failure in 2005 and wondered if she had had a myocardial infarction at that time as well.  There was evidence of LAD distribution akinesis as well as the inferior wall akinesis on echocardiogram in 12/2016.      The patient presented for atypical chest pain on 05/02 of this year.  Echocardiogram was performed and demonstrated hyperdynamic left ventricular function without regional wall motion abnormalities.  Coronary angiography was then performed and demonstrated moderate in-stent renarrowing within the right coronary artery which  did not appear to be flow limiting with a normal fractional flow reserve.  There was only mild disease in the left coronary system with some calcification and 15%-20% in the proximal LAD and 25%-35% narrowing after the septal .  The mid to distal LAD was normal.  The diagonal had a less than 35% stenosis.  Circumflex in essence comprised 1 multi-branching obtuse marginal with trivial plaque.  Ejection fraction on left ventriculogram was 52%-55% with end-diastolic pressure of 18 mmHg.  Medical therapy was suggested.      The patient denies any significant shortness of breath.  She lives at assisted living.  We reviewed all her medications today.  I suggested that we stop her hydrochlorothiazide since her chronic systolic blood pressures are so low.  The patient notes that she is moving to [place] and would like me to leave her medications unchanged.  She notes some blood pressures in other localities that are high.  She has ongoing cerebrovascular disease including her carotids which are being followed by Vascular Surgery.  She is status post previous stenting of her left carotid artery in 04/2016.  She continues to smoke 3-4 cigarettes per day.  We again discussed smoking cessation for at least 7-10 minutes.  We also discussed the use of nicotine gum since the patches cause a rash to her skin.      PHYSICAL EXAMINATION:  As below.      Lipid profile on 06/06/2018:  Total cholesterol 182, HDL 53,  and triglycerides 130.  It is not clear if she is always taking her atorvastatin.       ASSESSMENT:    1.  Sonya Foote is a delightful 68-year-old female who presents for coronary artery disease with previous inferior wall myocardial infarction 09/2016.  Her current echocardiogram shows an ejection fraction that is hyperdynamic and greater than 70%.  Coronary angiography demonstrates moderate restenosis of the right coronary artery stents but no other significant coronary artery disease.  Ejection fraction is  well maintained.  No intervention is necessary.  She asks whether she can obtain leg prosthetics given her current heart condition.  I suggested that there should be no difficulty with this from a heart standpoint   2.  Diabetes mellitus being cared for by her primary care physicians.   3.  Peripheral arterial disease status post bilateral above-the-knee amputations.   4.  Systolic hypertension currently well controlled and even low.  I would consider stopping her hydrochlorothiazide in the future after she moves to [place] and when she will allow.   5.  Tobacco abuse.  I again discussed cessation with her and included both reasons and techniques to quit.      It has been my pleasure to assist in the care of Sharath Mercedes.  All her questions were answered to her satisfaction.  I will see her again on a p.r.n. basis given her upcoming move to [place].      Rosario Gaines MD       cc:   Allan Casey MD    [facility]    [address]  [address]         ROSARIO GAINES MD, [facility]             D: 2018   T: 2018   MT: LJ      Name:     SHARATH MERCEDES   MRN:      [id number]        Account:      [id number]   :      1950           Service Date: 2018      Document: [id number]         Outpatient Encounter Prescriptions as of 2018   Medication Sig Dispense Refill     ACETAMINOPHEN PO Take 1,000 mg by mouth every 4 hours as needed for pain Not to exceed 4000 mg/day       ADVAIR DISKUS 250-50 MCG/DOSE diskus inhaler Inhale 1 puff into the lungs 2 times daily 60 Inhaler 11     albuterol (2.5 MG/3ML) 0.083% neb solution INHALE 1 VIAL VIA NEBULIZER EVERY 6 HOURS AS NEEDED 360 mL 11     alendronate (FOSAMAX) 70 MG tablet Take 1 tablet (70 mg) by mouth with 8oz water every 7 days 30 minutes before breakfast and remain upright during this time. 12 tablet 3     aspirin EC 81 MG EC tablet Take 1 tablet (81 mg) by mouth daily 90 tablet 3     atorvastatin (LIPITOR) 40 MG  tablet Take 1 tablet (40 mg) by mouth daily 90 tablet 2     blood glucose monitoring (ONE TOUCH ULTRA 2) meter device kit Use to test blood sugars 3 times daily or as directed. 1 kit 0     blood glucose monitoring (ONE TOUCH ULTRA) test strip Use to test blood sugars 3 times daily or as directed. 3 Box 3     blood glucose monitoring (ONE TOUCH ULTRASOFT) lancets Use to test blood sugar 3 times daily or as directed. 100 each PRN     brimonidine (ALPHAGAN) 0.2 % ophthalmic solution Place 1 drop into the right eye 2 times daily 1 Bottle 11     calcium citrate-vitamin D (CITRACAL) 315-250 MG-UNIT TABS Take 2 tablets by mouth daily 120 tablet 5     cetirizine (ZYRTEC) 10 MG tablet Take 1 tablet (10 mg) by mouth daily as needed for allergies 90 tablet 3     Cholecalciferol (VITAMIN D) 2000 UNITS tablet Take 2,000 Units by mouth daily. 100 tablet 3     CYANOCOBALAMIN PO Take 2,000 mcg by mouth daily       diclofenac (VOLTAREN) 1 % GEL topical gel Apply 2 g topically 4 times daily to hands 100 g 11     dorzolamide (TRUSOPT) 2 % ophthalmic solution Place 1 drop into the right eye 2 times daily 1 Bottle 11     EPINEPHrine (EPIPEN/ADRENACLICK/OR ANY BX GENERIC EQUIV) 0.3 MG/0.3ML injection 2-pack Inject 0.3 mLs (0.3 mg) into the muscle as needed for anaphylaxis 0.6 mL 1     escitalopram (LEXAPRO) 10 MG tablet Take 10 mg by mouth daily        ferrous sulfate (IRON) 325 (65 FE) MG tablet Take 1 tablet (325 mg) by mouth 2 times daily With meals 60 tablet 2     fluticasone (FLONASE) 50 MCG/ACT nasal spray Spray 1 spray into both nostrils daily       hydrochlorothiazide (MICROZIDE) 12.5 MG capsule Take 1 capsule (12.5 mg) by mouth daily 90 capsule 2     lactulose (CHRONULAC) 10 GM/15ML solution Take 20 g by mouth as needed for constipation       latanoprost (XALATAN) 0.005 % ophthalmic solution Place 1 drop into both eyes At Bedtime 2.5 mL 11     levETIRAcetam (KEPPRA) 500 MG tablet Take 1 tablet (500 mg) by mouth 2 times daily 180  tablet 1     lidocaine (LIDODERM) 5 % Patch APPLY 1 TO 3 PATCHES TO PAINFUL AREA AT ONCE FOR UP TO 12 HOURS WITHIN A 24 HOUR PERIOD REMOVE AFTER 12 HOURS 30 patch 5     lisinopril (PRINIVIL/ZESTRIL) 20 MG tablet Take 1 tablet (20 mg) by mouth daily 90 tablet 0     MEDICATION GIVEN BY INTRATHECAL PUMP - INSTRUCTION continuous 2/8/18: Pump managed by [facility] in [place] [phone].   Conc: Bupivacaine 20 mg/mL and Fentanyl 2000 mcg/mL, morphine 2 mg/mL  Continuous:  Fentanyl 796 mcg/day, Bupivacaine 7.96 mg/day, Morphine 0.796 mg/day   Boluses: Up to 7 boluses per 24-hr period of Bupivicaine 0.5994 mg and Fentanyl 59.9 mcg morphine 0.0599 mg.  Pump Refill Date 02/28/18       metFORMIN (GLUCOPHAGE-XR) 500 MG 24 hr tablet Take 1 tablet (500 mg) by mouth daily (with dinner) 90 tablet 3     metoprolol succinate (TOPROL-XL) 50 MG 24 hr tablet Take 1 tablet (50 mg) by mouth daily 90 tablet 0     Multiple Vitamin (MULTIVITAMIN OR) Take 1 tablet by mouth daily        nicotine (NICODERM CQ) 14 MG/24HR 24 hr patch Place 1 patch onto the skin daily 30 patch 0     nitroglycerin (NITROSTAT) 0.4 MG SL tablet Place 1 tablet (0.4 mg) under the tongue every 5 minutes as needed for chest pain if you are still having symptoms after 3 doses (15 minutes) call 911. 25 tablet 1     ondansetron (ZOFRAN-ODT) 8 MG ODT tab Take 1 tablet (8 mg) by mouth every 8 hours as needed 30 tablet 5     order for DME Equipment being ordered: bandage tape, prefer paper 1 Package 0     order for DME Full electric hospital bed with half rails    Dx: Q57584, I110, J449  Length of need: lifetime 1 Device 0     order for DME Wheel Chair Cushion: 18 x 18 inch Roho cushion 1 Device 0     order for DME Hospital bed with side rails 1 Device 0     order for DME Equipment being ordered: CPAP supplies mask, hose, filters, cushion    fax to [facility] at [phone] 10 Device prn     order for DME Equipment being ordered: CPAP  supplies mask, hose, filters, cushion fax to [facility] at [phone]  Disp: 10 Device Refills: prn   Class: Local Print Start: 2/10/2017 1 Device 0     order for DME Equipment being ordered: Nebulizer and tubing supplies 1 Units 0     order for DME Equipment being ordered: Glucerna daily shakes with each meal 1 Box 11     ORDER FOR DME Equipment being ordered: Power Wheelchair 1 Device 0     ORDER FOR DME Equipment being ordered: Depends briefs 1 Month 11     oxyCODONE IR (ROXICODONE) 5 MG tablet Take 1 tablet (5 mg) by mouth every 6 hours as needed for pain 5 tablet 0     pantoprazole (PROTONIX) 40 MG EC tablet Take 1 tablet (40 mg) by mouth daily 30 tablet 5     phenytoin 200 MG CAPS Take 200 mg by mouth 2 times daily (Patient taking differently: Take 200 mg by mouth 2 times daily (takes at 8 AM and 8 PM)) 60 capsule 0     polyethylene glycol (MIRALAX/GLYCOLAX) Packet Take 17 g by mouth daily Dissolved in water or juice        pregabalin (LYRICA) 75 MG capsule Take 2 capsules (150 mg) by mouth 2 times daily 120 capsule 5     risperiDONE (RISPERDAL) 0.5 MG tablet Take 0.5 mg by mouth At Bedtime       sucralfate (CARAFATE) 1 GM tablet Take 1 tablet (1 g) by mouth 4 times daily May dissolve in 10 mL water is necessary. (Start upon completion of carafate suspension) 120 tablet 11     [] sulfamethoxazole-trimethoprim (BACTRIM DS/SEPTRA DS) 800-160 MG per tablet Take 1 tablet by mouth 2 times daily for 7 days 14 tablet 0     traZODone (DESYREL) 50 MG tablet Take 150 mg by mouth At Bedtime        umeclidinium (INCRUSE ELLIPTA) 62.5 MCG/INH oral inhaler Inhale 1 puff into the lungs daily       VENTOLIN  (90 BASE) MCG/ACT Inhaler Inhale 2 puffs into the lungs every 6 hours as needed for shortness of breath / dyspnea or wheezing 3 Inhaler 5     zinc 50 MG TABS Take 1 tablet by mouth daily       tetracycline (ACHROMYCIN/SUMYCIN) 500 MG capsule Take 1 capsule (500 mg) by mouth 2 times daily (Patient not  taking: Reported on 6/26/2018) 28 capsule 0     [DISCONTINUED] ESTRACE VAGINAL 0.1 MG/GM cream USE DIME SIZE AMOUNT 3X A WEEK AT NIGHT 42.5 g 11     Facility-Administered Encounter Medications as of 6/26/2018   Medication Dose Route Frequency Provider Last Rate Last Dose     glycopyrrolate (ROBINUL) injection    PRN Han Terrazas APRN CRNA   0.8 mg at 11/21/14 1230     neostigmine (PROSTIGMINE) injection    PRN Han Terrazas APRN CRNA   4 mg at 11/21/14 1230       Again, thank you for allowing me to participate in the care of your patient.      Sincerely,    Bj Anderson MD     [facility]

## 2018-06-26 NOTE — LETTER
6/26/2018    Allan Casey MD  [address] [facility] [address]    RE: Sonya Qamar       Dear Colleague,    I had the pleasure of seeing Sonya Foote in the [facility].    HPI and Plan:   See dictation:[id number]    No orders of the defined types were placed in this encounter.      No orders of the defined types were placed in this encounter.      Medications Discontinued During This Encounter   Medication Reason     ESTRACE VAGINAL 0.1 MG/GM cream Medication Reconciliation Clean Up         Encounter Diagnoses   Name Primary?     Coronary artery disease of native artery of native heart with stable angina pectoris (H)      Old myocardial infarction      Chronic systolic congestive heart failure (H)      Essential hypertension with goal blood pressure less than 130/80      Hyperlipidemia LDL goal <70        CURRENT MEDICATIONS:  Current Outpatient Prescriptions   Medication Sig Dispense Refill     ACETAMINOPHEN PO Take 1,000 mg by mouth every 4 hours as needed for pain Not to exceed 4000 mg/day       ADVAIR DISKUS 250-50 MCG/DOSE diskus inhaler Inhale 1 puff into the lungs 2 times daily 60 Inhaler 11     albuterol (2.5 MG/3ML) 0.083% neb solution INHALE 1 VIAL VIA NEBULIZER EVERY 6 HOURS AS NEEDED 360 mL 11     alendronate (FOSAMAX) 70 MG tablet Take 1 tablet (70 mg) by mouth with 8oz water every 7 days 30 minutes before breakfast and remain upright during this time. 12 tablet 3     aspirin EC 81 MG EC tablet Take 1 tablet (81 mg) by mouth daily 90 tablet 3     atorvastatin (LIPITOR) 40 MG tablet Take 1 tablet (40 mg) by mouth daily 90 tablet 2     blood glucose monitoring (ONE TOUCH ULTRA 2) meter device kit Use to test blood sugars 3 times daily or as directed. 1 kit 0     blood glucose monitoring (ONE TOUCH ULTRA) test strip Use to test blood sugars 3 times daily or as directed. 3 Box 3     blood glucose monitoring (ONE TOUCH ULTRASOFT) lancets Use to test blood sugar 3 times  daily or as directed. 100 each PRN     brimonidine (ALPHAGAN) 0.2 % ophthalmic solution Place 1 drop into the right eye 2 times daily 1 Bottle 11     calcium citrate-vitamin D (CITRACAL) 315-250 MG-UNIT TABS Take 2 tablets by mouth daily 120 tablet 5     cetirizine (ZYRTEC) 10 MG tablet Take 1 tablet (10 mg) by mouth daily as needed for allergies 90 tablet 3     Cholecalciferol (VITAMIN D) 2000 UNITS tablet Take 2,000 Units by mouth daily. 100 tablet 3     CYANOCOBALAMIN PO Take 2,000 mcg by mouth daily       diclofenac (VOLTAREN) 1 % GEL topical gel Apply 2 g topically 4 times daily to hands 100 g 11     dorzolamide (TRUSOPT) 2 % ophthalmic solution Place 1 drop into the right eye 2 times daily 1 Bottle 11     EPINEPHrine (EPIPEN/ADRENACLICK/OR ANY BX GENERIC EQUIV) 0.3 MG/0.3ML injection 2-pack Inject 0.3 mLs (0.3 mg) into the muscle as needed for anaphylaxis 0.6 mL 1     escitalopram (LEXAPRO) 10 MG tablet Take 10 mg by mouth daily        ferrous sulfate (IRON) 325 (65 FE) MG tablet Take 1 tablet (325 mg) by mouth 2 times daily With meals 60 tablet 2     fluticasone (FLONASE) 50 MCG/ACT nasal spray Spray 1 spray into both nostrils daily       hydrochlorothiazide (MICROZIDE) 12.5 MG capsule Take 1 capsule (12.5 mg) by mouth daily 90 capsule 2     lactulose (CHRONULAC) 10 GM/15ML solution Take 20 g by mouth as needed for constipation       latanoprost (XALATAN) 0.005 % ophthalmic solution Place 1 drop into both eyes At Bedtime 2.5 mL 11     levETIRAcetam (KEPPRA) 500 MG tablet Take 1 tablet (500 mg) by mouth 2 times daily 180 tablet 1     lidocaine (LIDODERM) 5 % Patch APPLY 1 TO 3 PATCHES TO PAINFUL AREA AT ONCE FOR UP TO 12 HOURS WITHIN A 24 HOUR PERIOD REMOVE AFTER 12 HOURS 30 patch 5     lisinopril (PRINIVIL/ZESTRIL) 20 MG tablet Take 1 tablet (20 mg) by mouth daily 90 tablet 0     MEDICATION GIVEN BY INTRATHECAL PUMP - INSTRUCTION continuous 2/8/18: Pump managed by [facility] in [place]  Julieth [phone].   Conc: Bupivacaine 20 mg/mL and Fentanyl 2000 mcg/mL, morphine 2 mg/mL  Continuous:  Fentanyl 796 mcg/day, Bupivacaine 7.96 mg/day, Morphine 0.796 mg/day   Boluses: Up to 7 boluses per 24-hr period of Bupivicaine 0.5994 mg and Fentanyl 59.9 mcg morphine 0.0599 mg.  Pump Refill Date 02/28/18       metFORMIN (GLUCOPHAGE-XR) 500 MG 24 hr tablet Take 1 tablet (500 mg) by mouth daily (with dinner) 90 tablet 3     metoprolol succinate (TOPROL-XL) 50 MG 24 hr tablet Take 1 tablet (50 mg) by mouth daily 90 tablet 0     Multiple Vitamin (MULTIVITAMIN OR) Take 1 tablet by mouth daily        nicotine (NICODERM CQ) 14 MG/24HR 24 hr patch Place 1 patch onto the skin daily 30 patch 0     nitroglycerin (NITROSTAT) 0.4 MG SL tablet Place 1 tablet (0.4 mg) under the tongue every 5 minutes as needed for chest pain if you are still having symptoms after 3 doses (15 minutes) call 911. 25 tablet 1     ondansetron (ZOFRAN-ODT) 8 MG ODT tab Take 1 tablet (8 mg) by mouth every 8 hours as needed 30 tablet 5     order for DME Equipment being ordered: bandage tape, prefer paper 1 Package 0     order for DME Full electric hospital bed with half rails    Dx: Z44692, I110, J449  Length of need: lifetime 1 Device 0     order for DME Wheel Chair Cushion: 18 x 18 inch Roho cushion 1 Device 0     order for DME Hospital bed with side rails 1 Device 0     order for DME Equipment being ordered: CPAP supplies mask, hose, filters, cushion    fax to [facility] at [phone] 10 Device prn     order for DME Equipment being ordered: CPAP supplies mask, hose, filters, cushion fax to [facility] at [phone]  Disp: 10 Device Refills: prn   Class: Local Print Start: 2/10/2017 1 Device 0     order for DME Equipment being ordered: Nebulizer and tubing supplies 1 Units 0     order for DME Equipment being ordered: Glucerna daily shakes with each meal 1 Box 11     ORDER FOR DME Equipment being ordered: Power Wheelchair 1 Device 0      ORDER FOR DME Equipment being ordered: Depends briefs 1 Month 11     oxyCODONE IR (ROXICODONE) 5 MG tablet Take 1 tablet (5 mg) by mouth every 6 hours as needed for pain 5 tablet 0     pantoprazole (PROTONIX) 40 MG EC tablet Take 1 tablet (40 mg) by mouth daily 30 tablet 5     phenytoin 200 MG CAPS Take 200 mg by mouth 2 times daily (Patient taking differently: Take 200 mg by mouth 2 times daily (takes at 8 AM and 8 PM)) 60 capsule 0     polyethylene glycol (MIRALAX/GLYCOLAX) Packet Take 17 g by mouth daily Dissolved in water or juice        pregabalin (LYRICA) 75 MG capsule Take 2 capsules (150 mg) by mouth 2 times daily 120 capsule 5     risperiDONE (RISPERDAL) 0.5 MG tablet Take 0.5 mg by mouth At Bedtime       sucralfate (CARAFATE) 1 GM tablet Take 1 tablet (1 g) by mouth 4 times daily May dissolve in 10 mL water is necessary. (Start upon completion of carafate suspension) 120 tablet 11     sulfamethoxazole-trimethoprim (BACTRIM DS/SEPTRA DS) 800-160 MG per tablet Take 1 tablet by mouth 2 times daily for 7 days 14 tablet 0     traZODone (DESYREL) 50 MG tablet Take 150 mg by mouth At Bedtime        umeclidinium (INCRUSE ELLIPTA) 62.5 MCG/INH oral inhaler Inhale 1 puff into the lungs daily       VENTOLIN  (90 BASE) MCG/ACT Inhaler Inhale 2 puffs into the lungs every 6 hours as needed for shortness of breath / dyspnea or wheezing 3 Inhaler 5     zinc 50 MG TABS Take 1 tablet by mouth daily       tetracycline (ACHROMYCIN/SUMYCIN) 500 MG capsule Take 1 capsule (500 mg) by mouth 2 times daily (Patient not taking: Reported on 6/26/2018) 28 capsule 0       ALLERGIES     Allergies   Allergen Reactions     Bee Venom      Penicillins Anaphylaxis     Other reaction(s): Skin Rash and/or Hives     Dilantin [Phenytoin] Other (See Comments)     Generic dilantin only per pt     Iodide Hives     09/11/15: Pt states she can use iodine and doesn't have any problems      Iodine-131      Novocaine [Procaine] Hives      Other reaction(s): Skin Rash and/or Hives     Tositumomab        PAST MEDICAL HISTORY:  Past Medical History:   Diagnosis Date     Anemia      Antiplatelet or antithrombotic long-term use      Arthritis      AS (sickle cell trait) (H) 10/8/2013     Blind left eye      BPPV (benign paroxysmal positional vertigo)      Chronic back pain      COPD (chronic obstructive pulmonary disease) (H)      Coronary artery disease      CVA (cerebral infarction) 10/8/2012    x3, residual left sided weakness     Diastolic CHF (H) 9/4/2012     Dilantin toxicity 5/31/2013     Esophageal candidiasis (H)      GERD (gastroesophageal reflux disease)      Heart attack      Hernia, abdominal      History of blood transfusion     x5     History of thrombophlebitis      HTN (hypertension) 9/4/2012     Hyperlipidemia LDL goal <100 9/4/2012     Legally blind in right eye, as defined in [place]     No periphal vision right eye     Osteoporosis 1/21/2013     Other chronic pain      PAD (peripheral artery disease) (H)      Palpitations      Person who has had sex change operation 1/20/2014     Pneumonia      Schizoaffective disorder (H)      Seizure disorder (H) 9/4/2012     Sleep apnea     CPAP     Thrombosis of leg      Type 2 diabetes, HbA1C goal < 8% (H) 9/4/2012     Uncomplicated asthma      Unspecified cerebral artery occlusion with cerebral infarction        PAST SURGICAL HISTORY:  Past Surgical History:   Procedure Laterality Date     AMPUTATE LEG ABOVE KNEE Left 6/11/2016    Procedure: AMPUTATE LEG ABOVE KNEE;  Surgeon: Mello Rodriguez MD;  [facility]     AMPUTATE LEG BELOW KNEE Right 11/7/2016    Procedure: AMPUTATE LEG BELOW KNEE;  Surgeon: Savannah Durant MD;  [facility]     AMPUTATE REVISION STUMP LOWER EXTREMITY Right 11/11/2016    Procedure: AMPUTATE REVISION STUMP LOWER EXTREMITY;  Surgeon: Savannah Durant MD;  [facility]     AMPUTATE REVISION STUMP LOWER EXTREMITY Right 11/16/2016    Procedure: AMPUTATE REVISION STUMP  LOWER EXTREMITY;  Surgeon: Savannah Durant MD;  [facility]     AMPUTATE TOE(S) Right 1/5/2016    Procedure: AMPUTATE TOE(S);  Surgeon: Mello Gaines DPM;  Location:  [facility]     ANGIOGRAM Bilateral 11/21/2014    Procedure: ANGIOGRAM;  Surgeon: Savannah Durant MD;  [facility]     ANGIOGRAM Left 1/16/2015    Procedure: ANGIOGRAM;  Surgeon: Savannah Durant MD;  [facility]     ANGIOGRAM Bilateral 9/14/2015    Procedure: ANGIOGRAM;  Surgeon: Savannah Duarnt MD;  [facility]     ANGIOGRAM Left 10/12/2015    Procedure: ANGIOGRAM;  Surgeon: Savannah Durant MD;  [facility]     ANGIOGRAM Right 6/6/2016    Procedure: ANGIOGRAM;  Surgeon: Savannah Durant MD;  [facility]     ANGIOPLASTY Right 6/6/2016    Procedure: ANGIOPLASTY;  Surgeon: Savannah Durant MD;  [facility]     APPENDECTOMY       BREAST SURGERY      right breast bx (benign)     CATARACT IOL, RT/LT Right      CHOLECYSTECTOMY       COLONOSCOPY N/A 8/25/2014    Procedure: COLONOSCOPY;  Surgeon: Mello Ferrer MD;  [facility]     COLONOSCOPY WITH CO2 INSUFFLATION N/A 8/20/2014    Procedure: COLONOSCOPY WITH CO2 INSUFFLATION;  Surgeon: Duane, William Charles, MD;  Location: [facility]     CYSTOSTOMY, INSERT TUBE SUPRAPUBIC, COMBINED N/A 1/16/2018    Procedure: COMBINED CYSTOSTOMY, INSERT TUBE SUPRAPUBIC;  Cystoscopy, Intraoperative Ultrasound, Suprapubic Tube Placement;  Surgeon: Keanu Dawson MD;  [facility]     ENDARTERECTOMY FEMORAL  5/23/2014    Procedure: ENDARTERECTOMY FEMORAL;  Surgeon: Jason Joshi MD;  [facility]     ESOPHAGOSCOPY, GASTROSCOPY, DUODENOSCOPY (EGD), COMBINED  12/14/2012    Procedure: COMBINED ESOPHAGOSCOPY, GASTROSCOPY, DUODENOSCOPY (EGD), BIOPSY SINGLE OR MULTIPLE;  ESOPHAGOSCOPY, GASTROSCOPY, DUODENOSCOPY (EGD), DILATATION ;  Surgeon: Elizabeth Stevenson MD;  Location:  GI     ESOPHAGOSCOPY, GASTROSCOPY, DUODENOSCOPY (EGD), COMBINED  12/31/2013    Procedure: COMBINED ESOPHAGOSCOPY,  GASTROSCOPY, DUODENOSCOPY (EGD), BIOPSY SINGLE OR MULTIPLE;;  Surgeon: Clemente Lopez MD;  [facility]     ESOPHAGOSCOPY, GASTROSCOPY, DUODENOSCOPY (EGD), COMBINED  4/1/2014    Procedure: COMBINED ESOPHAGOSCOPY, GASTROSCOPY, DUODENOSCOPY (EGD);;  Surgeon: Clemente Lopez MD;  [facility]     ESOPHAGOSCOPY, GASTROSCOPY, DUODENOSCOPY (EGD), COMBINED  6/28/2014    Procedure: COMBINED ESOPHAGOSCOPY, GASTROSCOPY, DUODENOSCOPY (EGD);  Surgeon: Clemente Lopez MD;  [facility]     ESOPHAGOSCOPY, GASTROSCOPY, DUODENOSCOPY (EGD), COMBINED N/A 8/20/2014    Procedure: COMBINED ESOPHAGOSCOPY, GASTROSCOPY, DUODENOSCOPY (EGD), BIOPSY SINGLE OR MULTIPLE;  Surgeon: Duane, William Charles, MD;  Location:  OR     ESOPHAGOSCOPY, GASTROSCOPY, DUODENOSCOPY (EGD), COMBINED N/A 8/22/2014    Procedure: COMBINED ESOPHAGOSCOPY, GASTROSCOPY, DUODENOSCOPY (EGD), BIOPSY SINGLE OR MULTIPLE;  Surgeon: Mello Ferrer MD;  [facility]     ESOPHAGOSCOPY, GASTROSCOPY, DUODENOSCOPY (EGD), COMBINED N/A 10/2/2014    Procedure: COMBINED ESOPHAGOSCOPY, GASTROSCOPY, DUODENOSCOPY (EGD), BIOPSY SINGLE OR MULTIPLE;  Surgeon: Remy Haskins MD;  [facility]     ESOPHAGOSCOPY, GASTROSCOPY, DUODENOSCOPY (EGD), COMBINED Left 12/15/2014    Procedure: COMBINED ESOPHAGOSCOPY, GASTROSCOPY, DUODENOSCOPY (EGD), BIOPSY SINGLE OR MULTIPLE;  Surgeon: Remy Haskins MD;  [facility]     ESOPHAGOSCOPY, GASTROSCOPY, DUODENOSCOPY (EGD), COMBINED N/A 2/25/2015    Procedure: COMBINED ENDOSCOPIC ULTRASOUND, ESOPHAGOSCOPY, GASTROSCOPY, DUODENOSCOPY (EGD), FINE NEEDLE ASPIRATE/BIOPSY;  Surgeon: Clemente Lugo MD;  [facility]     ESOPHAGOSCOPY, GASTROSCOPY, DUODENOSCOPY (EGD), COMBINED Left 2/25/2015    Procedure: COMBINED ESOPHAGOSCOPY, GASTROSCOPY, DUODENOSCOPY (EGD), BIOPSY SINGLE OR MULTIPLE;  Surgeon: lCemente Lugo MD;  Location:  GI     ESOPHAGOSCOPY, GASTROSCOPY, DUODENOSCOPY (EGD), COMBINED N/A 9/25/2016     Procedure: COMBINED ESOPHAGOSCOPY, GASTROSCOPY, DUODENOSCOPY (EGD);  Surgeon: Aziza Patiño MD;  [facility]     ESOPHAGOSCOPY, GASTROSCOPY, DUODENOSCOPY (EGD), COMBINED N/A 1/18/2017    Procedure: COMBINED ESOPHAGOSCOPY, GASTROSCOPY, DUODENOSCOPY (EGD), BIOPSY SINGLE OR MULTIPLE;  Surgeon: Clemente Lopez MD;  [facility]     ESOPHAGOSCOPY, GASTROSCOPY, DUODENOSCOPY (EGD), COMBINED N/A 11/26/2017    Procedure: COMBINED ESOPHAGOSCOPY, GASTROSCOPY, DUODENOSCOPY (EGD), REMOVE FOREIGN BODY;  Esophagogastroduodenoscopy with foreign body extraction  ;  Surgeon: Herberth Castrejon MD;  [facility]     ESOPHAGOSCOPY, GASTROSCOPY, DUODENOSCOPY (EGD), COMBINED N/A 11/26/2017    Procedure: COMBINED ESOPHAGOSCOPY, GASTROSCOPY, DUODENOSCOPY (EGD), REMOVE FOREIGN BODY;;  Surgeon: Herberth Castrejon MD;  [facility]     FASCIOTOMY LOWER EXTREMITY Left 6/10/2016    Procedure: FASCIOTOMY LOWER EXTREMITY;  Surgeon: Mello Rodriguez MD;  [facility]     HC CAPSULE ENDOSCOPY N/A 8/25/2014    Procedure: CAPSULE/PILL CAM ENDOSCOPY;  Surgeon: Remy Haskins MD;  [facility]     HC CAPSULE ENDOSCOPY N/A 10/2/2014    Procedure: CAPSULE/PILL CAM ENDOSCOPY;  Surgeon: Remy Haskins MD;  [facility]     ORTHOPEDIC SURGERY      broken wrist repair     SEX TRANSFORMATION SURGERY, MALE TO FEMALE      1974     SINUS SURGERY      cyst removed     TONSILLECTOMY       VASCULAR SURGERY      Left carotid stent       FAMILY HISTORY:  Family History   Problem Relation Age of Onset     Dementia Mother      Glaucoma Mother      Diabetes Mother      may have been type 1, childhood     Coronary Artery Disease Mother      MI     Glaucoma Father      Diabetes Father      may hev been type 1 - ??     Heart Failure Father      Cancer Maternal Aunt      leukemia     Schizophrenia Brother      Depression Brother      Suicide Sister      Depression Sister      Diabetes Sister      Cancer Maternal Aunt       ovarian     Glaucoma Maternal Grandmother      Diabetes Maternal Grandmother      Glaucoma Maternal Grandfather      Diabetes Maternal Grandfather      Glaucoma Paternal Grandmother      Diabetes Paternal Grandmother      Glaucoma Paternal Grandfather      Diabetes Paternal Grandfather      Breast Cancer Sister      Cerebrovascular Disease Brother      Colon Cancer No family hx of      Macular Degeneration No family hx of        SOCIAL HISTORY:  Social History     Social History     Marital status:      Spouse name: N/A     Number of children: 2     Years of education: N/A     Occupational History     retired      retired     Social History Main Topics     Smoking status: Current Every Day Smoker     Packs/day: 0.25     Years: 30.00     Types: Cigarettes, Cigars     Last attempt to quit: 11/1/2001     Smokeless tobacco: Never Used     Alcohol use No     Drug use: No     Sexual activity: Not Currently     Other Topics Concern     Caffeine Concern No     1 in the morning     Social History Narrative      Her father was in the  and she moved around a lot when she was a kid, and has lived abroad including in [facility] and the [facility].  She was previously employed as a mental health worker. Moved from [place] to [place] in 2012. She is currently living in assisted living ([facility] in [facility]).  Wife passed away 6/2017.            She has 2 adopted children (Kam and Prashanth)       Review of Systems:  Skin:  Positive for bruising     Eyes:  Positive for glasses;glaucoma    ENT:  Negative      Respiratory:  Positive for dyspnea on exertion;cough;shortness of breath;sleep apnea;CPAP     Cardiovascular:    Positive for;lightheadedness;palpitations;chest pain;edema;fatigue;dizziness hands  Gastroenterology: Positive for heartburn;reflux    Genitourinary:  not assessed      Musculoskeletal:  Positive for back pain;joint pain;arthritis    Neurologic:  Positive for headaches;numbness or tingling  of hands    Psychiatric:  Negative      Heme/Lymph/Imm:  Positive for allergies    Endocrine:  Positive for diabetes      Physical Exam:  Vitals: BP 92/58 (BP Location: Left arm, Patient Position: Chair, Cuff Size: Adult Regular)  Pulse 75  Wt 62.6 kg (138 lb)  SpO2 92%  BMI 52.47 kg/m2    Constitutional:  cooperative, alert and oriented, well developed, well nourished, in no acute distress   Examined in a wheelchair    Skin:  warm and dry to the touch, no apparent skin lesions or masses noted          Head:  normocephalic, no masses or lesions        Eyes:  pupils equal and round, conjunctivae and lids unremarkable, sclera white, no xanthalasma, EOMS intact, no nystagmus        Lymph:      ENT:  no pallor or cyanosis poor dentition      Neck:  carotid pulses are full and equal bilaterally;JVP normal bilateral carotid bruit      Respiratory:  normal breath sounds, clear to auscultation, normal A-P diameter, normal symmetry, normal respiratory excursion, no use of accessory muscles         Cardiac: regular rhythm;normal S1 and S2;apical impulse not displaced   S4 no presence of murmur          not assessed this visit                                        GI:  abdomen soft, non-tender, BS normoactive, no mass, no HSM, no bruits        Extremities and Muscular Skeletal:  normal muscle strength and tone;no spinal abnormalities noted         bilateral AKA    Neurological:  affect appropriate   examined in a wheelchair    Psych:  Alert and Oriented x 3        CC  Bj Anderson MD  [address] [address]                Thank you for allowing me to participate in the care of your patient.      Sincerely,     Bj Anderson MD     [facility]    cc:   Bj Anderson MD  [address] [address]

## 2018-06-26 NOTE — PROGRESS NOTES
Service Date: 06/26/2018      PRIMARY CARE PHYSICIAN:  Dr. Allan Casey.      HISTORY OF PRESENT ILLNESS:  I again had the pleasure of seeing your patient, Sonya Foote, at [facility], [place] for evaluation of coronary artery disease and panvascular disease.  Sonya Foote is a pleasant 68-year-old -American female with a history of chronic dysphagia, esophageal strictures and previous dilatations.  She underwent a left above-the-knee amputation for peripheral vascular disease and thrombosis on 06/06/2016 and a right above-the-knee amputation for peripheral arterial disease on 11/23/2016.  The patient presented for unstable angina and inferior wall myocardial infarction to the [facility] in 09/2016.  Her ejection fraction by cardiac MRI in June had been 60%.  She underwent coronary angiography in 09/2016 with high-grade stenosis of the proximal right coronary artery.  Intracoronary stenting was performed.  Her ejection fraction fell to 35%-40%.  She has remained on aspirin and Plavix since that time.  She has intermittent episodes of substernal chest pressure relieved with sublingual nitroglycerin.  The patient had a hemorrhagic stroke and kidney failure in 2005 and wondered if she had had a myocardial infarction at that time as well.  There was evidence of LAD distribution akinesis as well as the inferior wall akinesis on echocardiogram in 12/2016.      The patient presented for atypical chest pain on 05/02 of this year.  Echocardiogram was performed and demonstrated hyperdynamic left ventricular function without regional wall motion abnormalities.  Coronary angiography was then performed and demonstrated moderate in-stent renarrowing within the right coronary artery which did not appear to be flow limiting with a normal fractional flow reserve.  There was only mild disease in the left coronary system with some calcification and 15%-20% in the proximal LAD and 25%-35% narrowing after  the septal .  The mid to distal LAD was normal.  The diagonal had a less than 35% stenosis.  Circumflex in essence comprised 1 multi-branching obtuse marginal with trivial plaque.  Ejection fraction on left ventriculogram was 52%-55% with end-diastolic pressure of 18 mmHg.  Medical therapy was suggested.      The patient denies any significant shortness of breath.  She lives at assisted living.  We reviewed all her medications today.  I suggested that we stop her hydrochlorothiazide since her chronic systolic blood pressures are so low.  The patient notes that she is moving to [place] and would like me to leave her medications unchanged.  She notes some blood pressures in other localities that are high.  She has ongoing cerebrovascular disease including her carotids which are being followed by Vascular Surgery.  She is status post previous stenting of her left carotid artery in 04/2016.  She continues to smoke 3-4 cigarettes per day.  We again discussed smoking cessation for at least 7-10 minutes.  We also discussed the use of nicotine gum since the patches cause a rash to her skin.      PHYSICAL EXAMINATION:  As below.      Lipid profile on 06/06/2018:  Total cholesterol 182, HDL 53,  and triglycerides 130.  It is not clear if she is always taking her atorvastatin.       ASSESSMENT:    1.  Sonya Foote is a delightful 68-year-old female who presents for coronary artery disease with previous inferior wall myocardial infarction 09/2016.  Her current echocardiogram shows an ejection fraction that is hyperdynamic and greater than 70%.  Coronary angiography demonstrates moderate restenosis of the right coronary artery stents but no other significant coronary artery disease.  Ejection fraction is well maintained.  No intervention is necessary.  She asks whether she can obtain leg prosthetics given her current heart condition.  I suggested that there should be no difficulty with this from a heart standpoint    2.  Diabetes mellitus being cared for by her primary care physicians.   3.  Peripheral arterial disease status post bilateral above-the-knee amputations.   4.  Systolic hypertension currently well controlled and even low.  I would consider stopping her hydrochlorothiazide in the future after she moves to [place] and when she will allow.   5.  Tobacco abuse.  I again discussed cessation with her and included both reasons and techniques to quit.      It has been my pleasure to assist in the care of Sharath Mercedes.  All her questions were answered to her satisfaction.  I will see her again on a p.r.n. basis given her upcoming move to [place].      Rosario Gaines MD       cc:   Allan Casey MD    [address]  [address]         ROS[facility] GAINES MD, [facility]             D: 2018   T: 2018   MT: COURTNEY      Name:     SHARATH MERCEDES   MRN:      [id number]        Account:      [id number]   :      1950           Service Date: 2018      Document: [id number]

## 2018-06-26 NOTE — MR AVS SNAPSHOT
After Visit Summary   6/26/2018    Sonya Foote    MRN: [id number]           Patient Information     Date Of Birth          1950        Visit Information        Provider Department      6/26/2018 10:45 AM Bj Anderson MD [facility]        Today's Diagnoses     Coronary artery disease of native artery of native heart with stable angina pectoris (H)        Old myocardial infarction        Chronic systolic congestive heart failure (H)        Essential hypertension with goal blood pressure less than 130/80        Hyperlipidemia LDL goal <70           Follow-ups after your visit        Your next 10 appointments already scheduled     Jul 05, 2018  9:45 AM CDT   (Arrive by 9:30 AM)   Return Visit with Elizabeth Oliver MD   [facility] ([facility])    [address] [place] [address]  4th [facility] [id number] [phone]            Jul 26, 2018 10:15 AM CDT   RETURN GLAUCOMA with Marely Robin MD   [facility] ([facility])    [address] Clin [address]  [facility] [id number] [phone]            Aug 13, 2018  9:00 AM CDT   (Arrive by 8:45 AM)   Return Visit with Alina Jennings MD   [facility] ([facility])    [address] [place] [address]  [address] [place] [id number] [phone]              Who to contact     If you have questions or need follow up information about today's clinic visit or your schedule please contact [facility]   [facility] directly at [phone].  Normal or non-critical lab and imaging results will be communicated to you by MyChart, letter or phone within 4 business days after the clinic has received the results. If you do not hear from us within 7 days, please contact the clinic through MyChart or phone.  If you have a critical or abnormal lab result, we will notify you by phone as soon as possible.  Submit refill requests through [employer] or call your pharmacy and they will forward the refill request to us. Please allow 3 business days for your refill to be completed.          Additional Information About Your Visit        Care EveryWhere ID     This is your Care EveryWhere ID. This could be used by other organizations to access your [facility] [place] medical records  [id number]        Your Vitals Were     Pulse Pulse Oximetry BMI (Body Mass Index)             75 92% 52.47 kg/m2          Blood Pressure from Last 3 Encounters:   06/26/18 92/58   06/25/18 100/64   06/16/18 108/62    Weight from Last 3 Encounters:   06/26/18 62.6 kg (138 lb)   06/25/18 62.6 kg (138 lb)   06/12/18 62.6 kg (138 lb)              We Performed the Following     Follow-Up with Cardiologist          Today's Medication Changes          These changes are accurate as of 6/26/18 11:05 AM.  If you have any questions, ask your nurse or doctor.               These medicines have changed or have updated prescriptions.        Dose/Directions    Phenytoin Sodium Extended 200 MG Caps   This may have changed:  additional instructions   Used for:  Seizure disorder (H)        Dose:  200 mg   Take 200 mg by mouth 2 times daily   Quantity:  60 capsule   Refills:  0                Primary Care Provider Office Phone # Fax #    Allan Casey [phone]       [address] [facility] [address]        Equal Access to Services     IRAJ CARREON AH: Hadii shaheen Cohen, waaxda newton, qaybta kaalmada shashank, tonie marroquin. So [facility] [phone].    ATENCIÓN: Si habla español, tiene a briones disposición servicios gratuitos de asistencia lingüística. Meño al [phone].    We comply with applicable federal civil rights laws and [place] laws. We do not discriminate on the basis of race, color, national origin,  age, disability, sex, sexual orientation, or gender identity.            Thank you!     Thank you for choosing [facility]  for your care. Our goal is always to provide you with excellent care. Hearing back from our patients is one way we can continue to improve our services. Please take a few minutes to complete the written survey that you may receive in the mail after your visit with us. Thank you!             Your Updated Medication List - Protect others around you: Learn how to safely use, store and throw away your medicines at www.disposemymeds.org.          This list is accurate as of 6/26/18 11:05 AM.  Always use your most recent med list.                   Brand Name Dispense Instructions for use Diagnosis    ACETAMINOPHEN PO      Take 1,000 mg by mouth every 4 hours as needed for pain Not to exceed 4000 mg/day        ADVAIR DISKUS 250-50 MCG/DOSE diskus inhaler   Generic drug:  fluticasone-salmeterol     60 Inhaler    Inhale 1 puff into the lungs 2 times daily    Acute bronchitis       * albuterol (2.5 MG/3ML) 0.083% neb solution     360 mL    INHALE 1 VIAL VIA NEBULIZER EVERY 6 HOURS AS NEEDED    Chronic obstructive pulmonary disease, unspecified COPD type (H)       * VENTOLIN  (90 Base) MCG/ACT Inhaler   Generic drug:  albuterol     3 Inhaler    Inhale 2 puffs into the lungs every 6 hours as needed for shortness of breath / dyspnea or wheezing    Chronic obstructive pulmonary disease, unspecified COPD type (H)       alendronate 70 MG tablet    FOSAMAX    12 tablet    Take 1 tablet (70 mg) by mouth with 8oz water every 7 days 30 minutes before breakfast and remain upright during this time.    Age-related osteoporosis without current pathological fracture       aspirin 81 MG EC tablet     90 tablet    Take 1 tablet (81 mg) by mouth daily    Unstable angina (H)       atorvastatin 40 MG tablet    LIPITOR    90 tablet    Take 1 tablet (40 mg) by mouth daily     Hyperlipidemia LDL goal <100       blood glucose monitoring lancets     100 each    Use to test blood sugar 3 times daily or as directed.    Type 2 diabetes mellitus with diabetic peripheral angiopathy without gangrene, without long-term current use of insulin (H)       blood glucose monitoring meter device kit     1 kit    Use to test blood sugars 3 times daily or as directed.    Type 2 diabetes, HbA1C goal < 8% (H)       blood glucose monitoring test strip    ONETOUCH ULTRA    3 Box    Use to test blood sugars 3 times daily or as directed.    Type 2 diabetes mellitus with diabetic peripheral angiopathy without gangrene, without long-term current use of insulin (H)       brimonidine 0.2 % ophthalmic solution    ALPHAGAN    1 Bottle    Place 1 drop into the right eye 2 times daily    Primary open angle glaucoma of both eyes, severe stage       calcium citrate-vitamin D 315-250 MG-UNIT Tabs per tablet    CITRACAL    120 tablet    Take 2 tablets by mouth daily    Osteoporosis       cetirizine 10 MG tablet    zyrTEC    90 tablet    Take 1 tablet (10 mg) by mouth daily as needed for allergies    Seasonal allergic rhinitis, unspecified allergic rhinitis trigger       CYANOCOBALAMIN PO      Take 2,000 mcg by mouth daily        diclofenac 1 % Gel topical gel    VOLTAREN    100 g    Apply 2 g topically 4 times daily to hands    Primary osteoarthritis of both hands       dorzolamide 2 % ophthalmic solution    TRUSOPT    1 Bottle    Place 1 drop into the right eye 2 times daily    Primary open angle glaucoma of both eyes, severe stage       EPINEPHrine 0.3 MG/0.3ML injection 2-pack    EPIPEN/ADRENACLICK/or ANY BX GENERIC EQUIV    0.6 mL    Inject 0.3 mLs (0.3 mg) into the muscle as needed for anaphylaxis    Bee sting reaction, undetermined intent, subsequent encounter       escitalopram 10 MG tablet    LEXAPRO     Take 10 mg by mouth daily        ferrous sulfate 325 (65 Fe) MG tablet    IRON    60 tablet    Take 1 tablet (325  mg) by mouth 2 times daily With meals        fluticasone 50 MCG/ACT spray    FLONASE     Spray 1 spray into both nostrils daily        hydrochlorothiazide 12.5 MG capsule    MICROZIDE    90 capsule    Take 1 capsule (12.5 mg) by mouth daily    Essential hypertension with goal blood pressure less than 130/80       INCRUSE ELLIPTA 62.5 MCG/INH oral inhaler   Generic drug:  umeclidinium      Inhale 1 puff into the lungs daily        lactulose 10 GM/15ML solution    CHRONULAC     Take 20 g by mouth as needed for constipation        latanoprost 0.005 % ophthalmic solution    XALATAN    2.5 mL    Place 1 drop into both eyes At Bedtime    Primary open angle glaucoma of both eyes, severe stage       levETIRAcetam 500 MG tablet    KEPPRA    180 tablet    Take 1 tablet (500 mg) by mouth 2 times daily    Nausea       lidocaine 5 % Patch    LIDODERM    30 patch    APPLY 1 TO 3 PATCHES TO PAINFUL AREA AT ONCE FOR UP TO 12 HOURS WITHIN A 24 HOUR PERIOD REMOVE AFTER 12 HOURS    Chronic pain syndrome, 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 (H)       lisinopril 20 MG tablet    PRINIVIL/ZESTRIL    90 tablet    Take 1 tablet (20 mg) by mouth daily    History of coronary artery disease       MEDICATION GIVEN BY INTRATHECAL PUMP - INSTRUCTION      continuous 2/8/18: Pump managed by [facility] in [place] [phone].  Conc: Bupivacaine 20 mg/mL and Fentanyl 2000 mcg/mL, morphine 2 mg/mL Continuous:  Fentanyl 796 mcg/day, Bupivacaine 7.96 mg/day, Morphine 0.796 mg/day  Boluses: Up to 7 boluses per 24-hr period of Bupivicaine 0.5994 mg and Fentanyl 59.9 mcg morphine 0.0599 mg. Pump Refill Date 02/28/18        metFORMIN 500 MG 24 hr tablet    GLUCOPHAGE-XR    90 tablet    Take 1 tablet (500 mg) by mouth daily (with dinner)    Type 2 diabetes mellitus with diabetic peripheral angiopathy and gangrene, without long-term current use of insulin (H)       metoprolol succinate 50 MG 24 hr tablet     TOPROL-XL    90 tablet    Take 1 tablet (50 mg) by mouth daily    History of coronary artery disease       MULTIVITAMIN PO      Take 1 tablet by mouth daily        nicotine 14 MG/24HR 24 hr patch    NICODERM CQ    30 patch    Place 1 patch onto the skin daily    Tobacco dependence syndrome       nitroGLYcerin 0.4 MG sublingual tablet    NITROSTAT    25 tablet    Place 1 tablet (0.4 mg) under the tongue every 5 minutes as needed for chest pain if you are still having symptoms after 3 doses (15 minutes) call 911.    Chronic systolic congestive heart failure (H), Old myocardial infarction       ondansetron 8 MG ODT tab    ZOFRAN-ODT    30 tablet    Take 1 tablet (8 mg) by mouth every 8 hours as needed    Other migraine without status migrainosus, not intractable       order for DME     1 Month    Equipment being ordered: Depends briefs    Incontinence       order for DME     1 Device    Equipment being ordered: Power Wheelchair    CVA (cerebral infarction), HTN (hypertension)       order for DME     1 Box    Equipment being ordered: Glucerna daily shakes with each meal    Type 2 diabetes mellitus with other diabetic neurological complication       * order for DME     1 Units    Equipment being ordered: Nebulizer and tubing supplies    Simple chronic bronchitis (H)       * order for DME     1 Device    Equipment being ordered: CPAP supplies mask, hose, filters, cushion fax to [facility] at [phone] Disp: 10 DeviceRefills: prn Class: Local PrintStart: 2/10/2017    Chronic obstructive pulmonary disease, unspecified COPD type (H)       * order for DME     10 Device    Equipment being ordered: CPAP supplies mask, hose, filters, cushion  fax to [facility] at [phone]    COPD (chronic obstructive pulmonary disease) (H)       * order for DME     1 Device    Hospital bed with side rails    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 (H)       * order for DME     1 Device    [facility]  bed with half rails  Dx: U37296, I110, J449 Length of need: lifetime    Status post bilateral above knee amputation (H)       * order for DME     1 Device    Wheel Chair Cushion: 18 x 18 inch Roho cushion    Status post bilateral above knee amputation (H)       order for DME     1 Package    Equipment being ordered: bandage tape, prefer paper    Burn of skin       oxyCODONE IR 5 MG tablet    ROXICODONE    5 tablet    Take 1 tablet (5 mg) by mouth every 6 hours as needed for pain    Bladder spasm       pantoprazole 40 MG EC tablet    PROTONIX    30 tablet    Take 1 tablet (40 mg) by mouth daily    Gastroesophageal reflux disease without esophagitis       Phenytoin Sodium Extended 200 MG Caps     60 capsule    Take 200 mg by mouth 2 times daily    Seizure disorder (H)       polyethylene glycol Packet    MIRALAX/GLYCOLAX     Take 17 g by mouth daily Dissolved in water or juice        pregabalin 75 MG capsule    LYRICA    120 capsule    Take 2 capsules (150 mg) by mouth 2 times daily    Pain in both upper extremities       risperiDONE 0.5 MG tablet    risperDAL     Take 0.5 mg by mouth At Bedtime        sucralfate 1 GM tablet    CARAFATE    120 tablet    Take 1 tablet (1 g) by mouth 4 times daily May dissolve in 10 mL water is necessary. (Start upon completion of carafate suspension)    Adjustment disorder with depressed mood       sulfamethoxazole-trimethoprim 800-160 MG per tablet    BACTRIM DS/SEPTRA DS    14 tablet    Take 1 tablet by mouth 2 times daily for 7 days    E-coli UTI       tetracycline 500 MG capsule    ACHROMYCIN/SUMYCIN    28 capsule    Take 1 capsule (500 mg) by mouth 2 times daily    Dysuria       traZODone 50 MG tablet    DESYREL     Take 150 mg by mouth At Bedtime        vitamin D 2000 units tablet     100 tablet    Take 2,000 Units by mouth daily.        zinc 50 MG Tabs      Take 1 tablet by mouth daily        * Notice:  This list has 8 medication(s) that are the same as other medications  prescribed for you. Read the directions carefully, and ask your doctor or other care provider to review them with you.

## 2018-06-26 NOTE — PROGRESS NOTES
HPI and Plan:   See dictation:[id number]    No orders of the defined types were placed in this encounter.      No orders of the defined types were placed in this encounter.      Medications Discontinued During This Encounter   Medication Reason     ESTRACE VAGINAL 0.1 MG/GM cream Medication Reconciliation Clean Up         Encounter Diagnoses   Name Primary?     Coronary artery disease of native artery of native heart with stable angina pectoris (H)      Old myocardial infarction      Chronic systolic congestive heart failure (H)      Essential hypertension with goal blood pressure less than 130/80      Hyperlipidemia LDL goal <70        CURRENT MEDICATIONS:  Current Outpatient Prescriptions   Medication Sig Dispense Refill     ACETAMINOPHEN PO Take 1,000 mg by mouth every 4 hours as needed for pain Not to exceed 4000 mg/day       ADVAIR DISKUS 250-50 MCG/DOSE diskus inhaler Inhale 1 puff into the lungs 2 times daily 60 Inhaler 11     albuterol (2.5 MG/3ML) 0.083% neb solution INHALE 1 VIAL VIA NEBULIZER EVERY 6 HOURS AS NEEDED 360 mL 11     alendronate (FOSAMAX) 70 MG tablet Take 1 tablet (70 mg) by mouth with 8oz water every 7 days 30 minutes before breakfast and remain upright during this time. 12 tablet 3     aspirin EC 81 MG EC tablet Take 1 tablet (81 mg) by mouth daily 90 tablet 3     atorvastatin (LIPITOR) 40 MG tablet Take 1 tablet (40 mg) by mouth daily 90 tablet 2     blood glucose monitoring (ONE TOUCH ULTRA 2) meter device kit Use to test blood sugars 3 times daily or as directed. 1 kit 0     blood glucose monitoring (ONE TOUCH ULTRA) test strip Use to test blood sugars 3 times daily or as directed. 3 Box 3     blood glucose monitoring (ONE TOUCH ULTRASOFT) lancets Use to test blood sugar 3 times daily or as directed. 100 each PRN     brimonidine (ALPHAGAN) 0.2 % ophthalmic solution Place 1 drop into the right eye 2 times daily 1 Bottle 11     calcium citrate-vitamin D (CITRACAL) 315-250 MG-UNIT TABS Take 2  tablets by mouth daily 120 tablet 5     cetirizine (ZYRTEC) 10 MG tablet Take 1 tablet (10 mg) by mouth daily as needed for allergies 90 tablet 3     Cholecalciferol (VITAMIN D) 2000 UNITS tablet Take 2,000 Units by mouth daily. 100 tablet 3     CYANOCOBALAMIN PO Take 2,000 mcg by mouth daily       diclofenac (VOLTAREN) 1 % GEL topical gel Apply 2 g topically 4 times daily to hands 100 g 11     dorzolamide (TRUSOPT) 2 % ophthalmic solution Place 1 drop into the right eye 2 times daily 1 Bottle 11     EPINEPHrine (EPIPEN/ADRENACLICK/OR ANY BX GENERIC EQUIV) 0.3 MG/0.3ML injection 2-pack Inject 0.3 mLs (0.3 mg) into the muscle as needed for anaphylaxis 0.6 mL 1     escitalopram (LEXAPRO) 10 MG tablet Take 10 mg by mouth daily        ferrous sulfate (IRON) 325 (65 FE) MG tablet Take 1 tablet (325 mg) by mouth 2 times daily With meals 60 tablet 2     fluticasone (FLONASE) 50 MCG/ACT nasal spray Spray 1 spray into both nostrils daily       hydrochlorothiazide (MICROZIDE) 12.5 MG capsule Take 1 capsule (12.5 mg) by mouth daily 90 capsule 2     lactulose (CHRONULAC) 10 GM/15ML solution Take 20 g by mouth as needed for constipation       latanoprost (XALATAN) 0.005 % ophthalmic solution Place 1 drop into both eyes At Bedtime 2.5 mL 11     levETIRAcetam (KEPPRA) 500 MG tablet Take 1 tablet (500 mg) by mouth 2 times daily 180 tablet 1     lidocaine (LIDODERM) 5 % Patch APPLY 1 TO 3 PATCHES TO PAINFUL AREA AT ONCE FOR UP TO 12 HOURS WITHIN A 24 HOUR PERIOD REMOVE AFTER 12 HOURS 30 patch 5     lisinopril (PRINIVIL/ZESTRIL) 20 MG tablet Take 1 tablet (20 mg) by mouth daily 90 tablet 0     MEDICATION GIVEN BY INTRATHECAL PUMP - INSTRUCTION continuous 2/8/18: Pump managed by [facility] in [place] [phone].   Conc: Bupivacaine 20 mg/mL and Fentanyl 2000 mcg/mL, morphine 2 mg/mL  Continuous:  Fentanyl 796 mcg/day, Bupivacaine 7.96 mg/day, Morphine 0.796 mg/day   Boluses: Up to 7 boluses per 24-hr  period of Bupivicaine 0.5994 mg and Fentanyl 59.9 mcg morphine 0.0599 mg.  Pump Refill Date 02/28/18       metFORMIN (GLUCOPHAGE-XR) 500 MG 24 hr tablet Take 1 tablet (500 mg) by mouth daily (with dinner) 90 tablet 3     metoprolol succinate (TOPROL-XL) 50 MG 24 hr tablet Take 1 tablet (50 mg) by mouth daily 90 tablet 0     Multiple Vitamin (MULTIVITAMIN OR) Take 1 tablet by mouth daily        nicotine (NICODERM CQ) 14 MG/24HR 24 hr patch Place 1 patch onto the skin daily 30 patch 0     nitroglycerin (NITROSTAT) 0.4 MG SL tablet Place 1 tablet (0.4 mg) under the tongue every 5 minutes as needed for chest pain if you are still having symptoms after 3 doses (15 minutes) call 911. 25 tablet 1     ondansetron (ZOFRAN-ODT) 8 MG ODT tab Take 1 tablet (8 mg) by mouth every 8 hours as needed 30 tablet 5     order for DME Equipment being ordered: bandage tape, prefer paper 1 Package 0     order for DME Full electric hospital bed with half rails    Dx: G30871, I110, J449  Length of need: lifetime 1 Device 0     order for DME Wheel Chair Cushion: 18 x 18 inch Roho cushion 1 Device 0     order for DME Hospital bed with side rails 1 Device 0     order for DME Equipment being ordered: CPAP supplies mask, hose, filters, cushion    fax to [facility] at [phone] 10 Device prn     order for DME Equipment being ordered: CPAP supplies mask, hose, filters, cushion fax to [facility] at [phone]  Disp: 10 Device Refills: prn   Class: Local Print Start: 2/10/2017 1 Device 0     order for DME Equipment being ordered: Nebulizer and tubing supplies 1 Units 0     order for DME Equipment being ordered: Glucerna daily shakes with each meal 1 Box 11     ORDER FOR DME Equipment being ordered: Power Wheelchair 1 Device 0     ORDER FOR DME Equipment being ordered: Depends briefs 1 Month 11     oxyCODONE IR (ROXICODONE) 5 MG tablet Take 1 tablet (5 mg) by mouth every 6 hours as needed for pain 5 tablet 0     pantoprazole (PROTONIX) 40  MG EC tablet Take 1 tablet (40 mg) by mouth daily 30 tablet 5     phenytoin 200 MG CAPS Take 200 mg by mouth 2 times daily (Patient taking differently: Take 200 mg by mouth 2 times daily (takes at 8 AM and 8 PM)) 60 capsule 0     polyethylene glycol (MIRALAX/GLYCOLAX) Packet Take 17 g by mouth daily Dissolved in water or juice        pregabalin (LYRICA) 75 MG capsule Take 2 capsules (150 mg) by mouth 2 times daily 120 capsule 5     risperiDONE (RISPERDAL) 0.5 MG tablet Take 0.5 mg by mouth At Bedtime       sucralfate (CARAFATE) 1 GM tablet Take 1 tablet (1 g) by mouth 4 times daily May dissolve in 10 mL water is necessary. (Start upon completion of carafate suspension) 120 tablet 11     sulfamethoxazole-trimethoprim (BACTRIM DS/SEPTRA DS) 800-160 MG per tablet Take 1 tablet by mouth 2 times daily for 7 days 14 tablet 0     traZODone (DESYREL) 50 MG tablet Take 150 mg by mouth At Bedtime        umeclidinium (INCRUSE ELLIPTA) 62.5 MCG/INH oral inhaler Inhale 1 puff into the lungs daily       VENTOLIN  (90 BASE) MCG/ACT Inhaler Inhale 2 puffs into the lungs every 6 hours as needed for shortness of breath / dyspnea or wheezing 3 Inhaler 5     zinc 50 MG TABS Take 1 tablet by mouth daily       tetracycline (ACHROMYCIN/SUMYCIN) 500 MG capsule Take 1 capsule (500 mg) by mouth 2 times daily (Patient not taking: Reported on 6/26/2018) 28 capsule 0       ALLERGIES     Allergies   Allergen Reactions     Bee Venom      Penicillins Anaphylaxis     Other reaction(s): Skin Rash and/or Hives     Dilantin [Phenytoin] Other (See Comments)     Generic dilantin only per pt     Iodide Hives     09/11/15: Pt states she can use iodine and doesn't have any problems      Iodine-131      Novocaine [Procaine] Hives     Other reaction(s): Skin Rash and/or Hives     Tositumomab        PAST MEDICAL HISTORY:  Past Medical History:   Diagnosis Date     Anemia      Antiplatelet or antithrombotic long-term use      Arthritis      AS (sickle  cell trait) (H) 10/8/2013     Blind left eye      BPPV (benign paroxysmal positional vertigo)      Chronic back pain      COPD (chronic obstructive pulmonary disease) (H)      Coronary artery disease      CVA (cerebral infarction) 10/8/2012    x3, residual left sided weakness     Diastolic CHF (H) 9/4/2012     Dilantin toxicity 5/31/2013     Esophageal candidiasis (H)      GERD (gastroesophageal reflux disease)      Heart attack      Hernia, abdominal      History of blood transfusion     x5     History of thrombophlebitis      HTN (hypertension) 9/4/2012     Hyperlipidemia LDL goal <100 9/4/2012     Legally blind in right eye, as defined in [place]     No periphal vision right eye     Osteoporosis 1/21/2013     Other chronic pain      PAD (peripheral artery disease) (H)      Palpitations      Person who has had sex change operation 1/20/2014     Pneumonia      Schizoaffective disorder (H)      Seizure disorder (H) 9/4/2012     Sleep apnea     CPAP     Thrombosis of leg      Type 2 diabetes, HbA1C goal < 8% (H) 9/4/2012     Uncomplicated asthma      Unspecified cerebral artery occlusion with cerebral infarction        PAST SURGICAL HISTORY:  Past Surgical History:   Procedure Laterality Date     AMPUTATE LEG ABOVE KNEE Left 6/11/2016    Procedure: AMPUTATE LEG ABOVE KNEE;  Surgeon: Mello Rodriguez MD;  [facility]     AMPUTATE LEG BELOW KNEE Right 11/7/2016    Procedure: AMPUTATE LEG BELOW KNEE;  Surgeon: Savannah Durant MD;  [facility]     AMPUTATE REVISION STUMP LOWER EXTREMITY Right 11/11/2016    Procedure: AMPUTATE REVISION STUMP LOWER EXTREMITY;  Surgeon: Savannah Durant MD;  [facility]     AMPUTATE REVISION STUMP LOWER EXTREMITY Right 11/16/2016    Procedure: AMPUTATE REVISION STUMP LOWER EXTREMITY;  Surgeon: Savannah Durant MD;  [facility]     AMPUTATE TOE(S) Right 1/5/2016    Procedure: AMPUTATE TOE(S);  Surgeon: Mello Gaines DPM;  Location:  [facility]     ANGIOGRAM Bilateral 11/21/2014     Procedure: ANGIOGRAM;  Surgeon: Savannah Durant MD;  [facility]     ANGIOGRAM Left 1/16/2015    Procedure: ANGIOGRAM;  Surgeon: Savannah Durant MD;  [facility]     ANGIOGRAM Bilateral 9/14/2015    Procedure: ANGIOGRAM;  Surgeon: Savannah Durant MD;  [facility]     ANGIOGRAM Left 10/12/2015    Procedure: ANGIOGRAM;  Surgeon: Savannah Durant MD;  [facility]     ANGIOGRAM Right 6/6/2016    Procedure: ANGIOGRAM;  Surgeon: Savannah Durant MD;  [facility]     ANGIOPLASTY Right 6/6/2016    Procedure: ANGIOPLASTY;  Surgeon: Savannah Durant MD;  [facility]     APPENDECTOMY       BREAST SURGERY      right breast bx (benign)     CATARACT IOL, RT/LT Right      CHOLECYSTECTOMY       COLONOSCOPY N/A 8/25/2014    Procedure: COLONOSCOPY;  Surgeon: Mello Ferrer MD;  [facility]     COLONOSCOPY WITH CO2 INSUFFLATION N/A 8/20/2014    Procedure: COLONOSCOPY WITH CO2 INSUFFLATION;  Surgeon: Duane, William Charles, MD;  [facility]     CYSTOSTOMY, INSERT TUBE SUPRAPUBIC, COMBINED N/A 1/16/2018    Procedure: COMBINED CYSTOSTOMY, INSERT TUBE SUPRAPUBIC;  Cystoscopy, Intraoperative Ultrasound, Suprapubic Tube Placement;  Surgeon: Keanu Dawson MD;  [facility]     ENDARTERECTOMY FEMORAL  5/23/2014    Procedure: ENDARTERECTOMY FEMORAL;  Surgeon: Jasno Joshi MD;  [facility]     ESOPHAGOSCOPY, GASTROSCOPY, DUODENOSCOPY (EGD), COMBINED  12/14/2012    Procedure: COMBINED ESOPHAGOSCOPY, GASTROSCOPY, DUODENOSCOPY (EGD), BIOPSY SINGLE OR MULTIPLE;  ESOPHAGOSCOPY, GASTROSCOPY, DUODENOSCOPY (EGD), DILATATION ;  Surgeon: Elizabeth Stevenson MD;  Location:  GI     ESOPHAGOSCOPY, GASTROSCOPY, DUODENOSCOPY (EGD), COMBINED  12/31/2013    Procedure: COMBINED ESOPHAGOSCOPY, GASTROSCOPY, DUODENOSCOPY (EGD), BIOPSY SINGLE OR MULTIPLE;;  Surgeon: Clemente Lopez MD;  [facility]     ESOPHAGOSCOPY, GASTROSCOPY, DUODENOSCOPY (EGD), COMBINED  4/1/2014    Procedure: COMBINED ESOPHAGOSCOPY,  GASTROSCOPY, DUODENOSCOPY (EGD);;  Surgeon: Clemente Lopez MD;  [facility]     ESOPHAGOSCOPY, GASTROSCOPY, DUODENOSCOPY (EGD), COMBINED  6/28/2014    Procedure: COMBINED ESOPHAGOSCOPY, GASTROSCOPY, DUODENOSCOPY (EGD);  Surgeon: Clemente Lopez MD;  [facility]     ESOPHAGOSCOPY, GASTROSCOPY, DUODENOSCOPY (EGD), COMBINED N/A 8/20/2014    Procedure: COMBINED ESOPHAGOSCOPY, GASTROSCOPY, DUODENOSCOPY (EGD), BIOPSY SINGLE OR MULTIPLE;  Surgeon: Duane, William Charles, MD;  [facility]     ESOPHAGOSCOPY, GASTROSCOPY, DUODENOSCOPY (EGD), COMBINED N/A 8/22/2014    Procedure: COMBINED ESOPHAGOSCOPY, GASTROSCOPY, DUODENOSCOPY (EGD), BIOPSY SINGLE OR MULTIPLE;  Surgeon: Mello Ferrer MD;  [facility]     ESOPHAGOSCOPY, GASTROSCOPY, DUODENOSCOPY (EGD), COMBINED N/A 10/2/2014    Procedure: COMBINED ESOPHAGOSCOPY, GASTROSCOPY, DUODENOSCOPY (EGD), BIOPSY SINGLE OR MULTIPLE;  Surgeon: Remy Haskins MD;  [facility]     ESOPHAGOSCOPY, GASTROSCOPY, DUODENOSCOPY (EGD), COMBINED Left 12/15/2014    Procedure: COMBINED ESOPHAGOSCOPY, GASTROSCOPY, DUODENOSCOPY (EGD), BIOPSY SINGLE OR MULTIPLE;  Surgeon: Remy Haskins MD;  [facility]     ESOPHAGOSCOPY, GASTROSCOPY, DUODENOSCOPY (EGD), COMBINED N/A 2/25/2015    Procedure: COMBINED ENDOSCOPIC ULTRASOUND, ESOPHAGOSCOPY, GASTROSCOPY, DUODENOSCOPY (EGD), FINE NEEDLE ASPIRATE/BIOPSY;  Surgeon: Clemente Lugo MD;  [facility]     ESOPHAGOSCOPY, GASTROSCOPY, DUODENOSCOPY (EGD), COMBINED Left 2/25/2015    Procedure: COMBINED ESOPHAGOSCOPY, GASTROSCOPY, DUODENOSCOPY (EGD), BIOPSY SINGLE OR MULTIPLE;  Surgeon: Clemente Lugo MD;  [facility]     ESOPHAGOSCOPY, GASTROSCOPY, DUODENOSCOPY (EGD), COMBINED N/A 9/25/2016    Procedure: COMBINED ESOPHAGOSCOPY, GASTROSCOPY, DUODENOSCOPY (EGD);  Surgeon: Aziza Patiño MD;  Location:  GI     ESOPHAGOSCOPY, GASTROSCOPY, DUODENOSCOPY (EGD), COMBINED N/A 1/18/2017    Procedure: COMBINED  ESOPHAGOSCOPY, GASTROSCOPY, DUODENOSCOPY (EGD), BIOPSY SINGLE OR MULTIPLE;  Surgeon: Clemente Lopez MD;  [facility]     ESOPHAGOSCOPY, GASTROSCOPY, DUODENOSCOPY (EGD), COMBINED N/A 11/26/2017    Procedure: COMBINED ESOPHAGOSCOPY, GASTROSCOPY, DUODENOSCOPY (EGD), REMOVE FOREIGN BODY;  Esophagogastroduodenoscopy with foreign body extraction  ;  Surgeon: Herberth Castrejon MD;  [facility]     ESOPHAGOSCOPY, GASTROSCOPY, DUODENOSCOPY (EGD), COMBINED N/A 11/26/2017    Procedure: COMBINED ESOPHAGOSCOPY, GASTROSCOPY, DUODENOSCOPY (EGD), REMOVE FOREIGN BODY;;  Surgeon: Herberth Castrejon MD;  [facility]     FASCIOTOMY LOWER EXTREMITY Left 6/10/2016    Procedure: FASCIOTOMY LOWER EXTREMITY;  Surgeon: Mello Rodriguez MD;  [facility]     HC CAPSULE ENDOSCOPY N/A 8/25/2014    Procedure: CAPSULE/PILL CAM ENDOSCOPY;  Surgeon: Remy Haskins MD;  [facility]     HC CAPSULE ENDOSCOPY N/A 10/2/2014    Procedure: CAPSULE/PILL CAM ENDOSCOPY;  Surgeon: Remy Haskins MD;  [facility]     ORTHOPEDIC SURGERY      broken wrist repair     SEX TRANSFORMATION SURGERY, MALE TO FEMALE      1974     SINUS SURGERY      cyst removed     TONSILLECTOMY       VASCULAR SURGERY      Left carotid stent       FAMILY HISTORY:  Family History   Problem Relation Age of Onset     Dementia Mother      Glaucoma Mother      Diabetes Mother      may have been type 1, childhood     Coronary Artery Disease Mother      MI     Glaucoma Father      Diabetes Father      may hev been type 1 - ??     Heart Failure Father      Cancer Maternal Aunt      leukemia     Schizophrenia Brother      Depression Brother      Suicide Sister      Depression Sister      Diabetes Sister      Cancer Maternal Aunt      ovarian     Glaucoma Maternal Grandmother      Diabetes Maternal Grandmother      Glaucoma Maternal Grandfather      Diabetes Maternal Grandfather      Glaucoma Paternal Grandmother      Diabetes Paternal Grandmother       Glaucoma Paternal Grandfather      Diabetes Paternal Grandfather      Breast Cancer Sister      Cerebrovascular Disease Brother      Colon Cancer No family hx of      Macular Degeneration No family hx of        SOCIAL HISTORY:  Social History     Social History     Marital status:      Spouse name: N/A     Number of children: 2     Years of education: N/A     Occupational History     retired      retired     Social History Main Topics     Smoking status: Current Every Day Smoker     Packs/day: 0.25     Years: 30.00     Types: Cigarettes, Cigars     Last attempt to quit: 11/1/2001     Smokeless tobacco: Never Used     Alcohol use No     Drug use: No     Sexual activity: Not Currently     Other Topics Concern     Caffeine Concern No     1 in the morning     Social History Narrative      Her father was in the  and she moved around a lot when she was a kid, and has lived abroad including in [place] and the [facility].  She was previously employed as a mental health worker. Moved from [place] to [place] in 2012. She is currently living in assisted living ([facility]).  Wife passed away 6/2017.            She has 2 adopted children (Kam and Prashanth)       Review of Systems:  Skin:  Positive for bruising     Eyes:  Positive for glasses;glaucoma    ENT:  Negative      Respiratory:  Positive for dyspnea on exertion;cough;shortness of breath;sleep apnea;CPAP     Cardiovascular:    Positive for;lightheadedness;palpitations;chest pain;edema;fatigue;dizziness hands  Gastroenterology: Positive for heartburn;reflux    Genitourinary:  not assessed      Musculoskeletal:  Positive for back pain;joint pain;arthritis    Neurologic:  Positive for headaches;numbness or tingling of hands    Psychiatric:  Negative      Heme/Lymph/Imm:  Positive for allergies    Endocrine:  Positive for diabetes      Physical Exam:  Vitals: BP 92/58 (BP Location: Left arm, Patient Position: Chair, Cuff Size:  Adult Regular)  Pulse 75  Wt 62.6 kg (138 lb)  SpO2 92%  BMI 52.47 kg/m2    Constitutional:  cooperative, alert and oriented, well developed, well nourished, in no acute distress   Examined in a wheelchair    Skin:  warm and dry to the touch, no apparent skin lesions or masses noted          Head:  normocephalic, no masses or lesions        Eyes:  pupils equal and round, conjunctivae and lids unremarkable, sclera white, no xanthalasma, EOMS intact, no nystagmus        Lymph:      ENT:  no pallor or cyanosis poor dentition      Neck:  carotid pulses are full and equal bilaterally;JVP normal bilateral carotid bruit      Respiratory:  normal breath sounds, clear to auscultation, normal A-P diameter, normal symmetry, normal respiratory excursion, no use of accessory muscles         Cardiac: regular rhythm;normal S1 and S2;apical impulse not displaced   S4 no presence of murmur          not assessed this visit                                        GI:  abdomen soft, non-tender, BS normoactive, no mass, no HSM, no bruits        Extremities and Muscular Skeletal:  normal muscle strength and tone;no spinal abnormalities noted         bilateral AKA    Neurological:  affect appropriate   examined in a wheelchair    Psych:  Alert and Oriented x 3        CC  Bj Anderson MD  [id number]  [place] [id number]

## 2018-06-28 ENCOUNTER — PATIENT OUTREACH (OUTPATIENT)
Dept: GERIATRIC MEDICINE | Facility: CLINIC | Age: 68
End: 2018-06-28

## 2018-06-28 ENCOUNTER — CARE COORDINATION (OUTPATIENT)
Dept: GERIATRIC MEDICINE | Facility: CLINIC | Age: 68
End: 2018-06-28

## 2018-06-28 NOTE — PROGRESS NOTES
[facility] Care Coordination Contact    [facility] Six-Month Telephone Assessment    6 month telephone assessment completed on 6/28/18 with member.    ER visits: Yes -  [facility] x2   Bladder spasms, obstructed catheter  Hospitalizations: Yes -  [facility]  x3 - Surgery - suprpubic catheter, burn/chest pain; headache  TCU stays: No  Significant health status changes: None  Falls/Injuries: No  ADL/IADL changes: No  Changes in services: No    Caregiver Assessment follow up:  N/A    Goals: See POC in chart for goal progress documentation.  Member currently has UTI - states culture came back so started on new atbx.  Feeling tired.   Member states that she is going to f/u with dentures in [place].  Member states she is moving to [place] to live with sister in August.  She is making all of her f/u appt with PCP and specialists prior to leaving to ensure everything is good with her health.  She states that she plans to f/u and establish PCP in [place] once she gets there.      Will see member in 6 months for an annual health risk assessment.   Encouraged member to call CC with any questions or concerns in the meantime.     Jamie Sharma RN, PHN  [facility]

## 2018-06-28 NOTE — PROGRESS NOTES
6/28/18: No further updates on pharmacy bills. Will wait until more updates.  Member aware.     Jamie Sharma RN, PHN  [facility]

## 2018-07-04 ENCOUNTER — HOSPITAL ENCOUNTER (EMERGENCY)
Facility: CLINIC | Age: 68
Discharge: HOME | End: 2018-07-04
Attending: EMERGENCY MEDICINE | Admitting: EMERGENCY MEDICINE
Payer: COMMERCIAL

## 2018-07-04 VITALS
BODY MASS INDEX: 31.94 KG/M2 | DIASTOLIC BLOOD PRESSURE: 60 MMHG | OXYGEN SATURATION: 100 % | SYSTOLIC BLOOD PRESSURE: 105 MMHG | WEIGHT: 138 LBS | HEART RATE: 91 BPM | TEMPERATURE: 97.8 F | RESPIRATION RATE: 19 BRPM | HEIGHT: 55 IN

## 2018-07-04 DIAGNOSIS — F11.93 OPIATE WITHDRAWAL (H): ICD-10-CM

## 2018-07-04 DIAGNOSIS — R11.2 NON-INTRACTABLE VOMITING WITH NAUSEA, UNSPECIFIED VOMITING TYPE: ICD-10-CM

## 2018-07-04 LAB
ALBUMIN SERPL-MCNC: 3.3 G/DL (ref 3.4–5)
ALP SERPL-CCNC: 292 U/L (ref 40–150)
ALT SERPL W P-5'-P-CCNC: 66 U/L (ref 0–50)
ANION GAP SERPL CALCULATED.3IONS-SCNC: 12 MMOL/L (ref 3–14)
AST SERPL W P-5'-P-CCNC: 53 U/L (ref 0–45)
BASOPHILS # BLD AUTO: 0 10E9/L (ref 0–0.2)
BASOPHILS NFR BLD AUTO: 0.2 %
BILIRUB SERPL-MCNC: 0.3 MG/DL (ref 0.2–1.3)
BUN SERPL-MCNC: 13 MG/DL (ref 7–30)
CALCIUM SERPL-MCNC: 9.5 MG/DL (ref 8.5–10.1)
CHLORIDE SERPL-SCNC: 108 MMOL/L (ref 94–109)
CO2 SERPL-SCNC: 18 MMOL/L (ref 20–32)
CREAT SERPL-MCNC: 0.59 MG/DL (ref 0.52–1.04)
DIFFERENTIAL METHOD BLD: ABNORMAL
EOSINOPHIL # BLD AUTO: 0.1 10E9/L (ref 0–0.7)
EOSINOPHIL NFR BLD AUTO: 0.6 %
ERYTHROCYTE [DISTWIDTH] IN BLOOD BY AUTOMATED COUNT: 13.5 % (ref 10–15)
GFR SERPL CREATININE-BSD FRML MDRD: >90 ML/MIN/1.7M2
GLUCOSE SERPL-MCNC: 175 MG/DL (ref 70–99)
HCT VFR BLD AUTO: 39.4 % (ref 35–47)
HGB BLD-MCNC: 14 G/DL (ref 11.7–15.7)
IMM GRANULOCYTES # BLD: 0 10E9/L (ref 0–0.4)
IMM GRANULOCYTES NFR BLD: 0.3 %
LIPASE SERPL-CCNC: 129 U/L (ref 73–393)
LYMPHOCYTES # BLD AUTO: 1.9 10E9/L (ref 0.8–5.3)
LYMPHOCYTES NFR BLD AUTO: 13.3 %
MCH RBC QN AUTO: 30.2 PG (ref 26.5–33)
MCHC RBC AUTO-ENTMCNC: 35.5 G/DL (ref 31.5–36.5)
MCV RBC AUTO: 85 FL (ref 78–100)
MONOCYTES # BLD AUTO: 0.9 10E9/L (ref 0–1.3)
MONOCYTES NFR BLD AUTO: 6.8 %
NEUTROPHILS # BLD AUTO: 10.9 10E9/L (ref 1.6–8.3)
NEUTROPHILS NFR BLD AUTO: 78.8 %
NRBC # BLD AUTO: 0 10*3/UL
NRBC BLD AUTO-RTO: 0 /100
PLATELET # BLD AUTO: 297 10E9/L (ref 150–450)
POTASSIUM SERPL-SCNC: 3.5 MMOL/L (ref 3.4–5.3)
PROT SERPL-MCNC: 8.8 G/DL (ref 6.8–8.8)
RBC # BLD AUTO: 4.63 10E12/L (ref 3.8–5.2)
SODIUM SERPL-SCNC: 138 MMOL/L (ref 133–144)
WBC # BLD AUTO: 13.9 10E9/L (ref 4–11)

## 2018-07-04 PROCEDURE — 85025 COMPLETE CBC W/AUTO DIFF WBC: CPT | Performed by: EMERGENCY MEDICINE

## 2018-07-04 PROCEDURE — 99285 EMERGENCY DEPT VISIT HI MDM: CPT | Mod: Z6 | Performed by: EMERGENCY MEDICINE

## 2018-07-04 PROCEDURE — 96374 THER/PROPH/DIAG INJ IV PUSH: CPT

## 2018-07-04 PROCEDURE — 80053 COMPREHEN METABOLIC PANEL: CPT | Performed by: EMERGENCY MEDICINE

## 2018-07-04 PROCEDURE — 96376 TX/PRO/DX INJ SAME DRUG ADON: CPT

## 2018-07-04 PROCEDURE — 96361 HYDRATE IV INFUSION ADD-ON: CPT | Mod: 59

## 2018-07-04 PROCEDURE — 25000132 ZZH RX MED GY IP 250 OP 250 PS 637: Performed by: EMERGENCY MEDICINE

## 2018-07-04 PROCEDURE — 96375 TX/PRO/DX INJ NEW DRUG ADDON: CPT

## 2018-07-04 PROCEDURE — 83690 ASSAY OF LIPASE: CPT | Performed by: EMERGENCY MEDICINE

## 2018-07-04 PROCEDURE — 99285 EMERGENCY DEPT VISIT HI MDM: CPT | Mod: 25

## 2018-07-04 PROCEDURE — 25000128 H RX IP 250 OP 636: Performed by: EMERGENCY MEDICINE

## 2018-07-04 RX ORDER — SODIUM CHLORIDE 9 MG/ML
1000 INJECTION, SOLUTION INTRAVENOUS CONTINUOUS
Status: DISCONTINUED | OUTPATIENT
Start: 2018-07-04 | End: 2018-07-04 | Stop reason: HOSPADM

## 2018-07-04 RX ORDER — LORAZEPAM 2 MG/ML
1 INJECTION INTRAMUSCULAR ONCE
Status: COMPLETED | OUTPATIENT
Start: 2018-07-04 | End: 2018-07-04

## 2018-07-04 RX ORDER — ONDANSETRON 2 MG/ML
4 INJECTION INTRAMUSCULAR; INTRAVENOUS EVERY 30 MIN PRN
Status: COMPLETED | OUTPATIENT
Start: 2018-07-04 | End: 2018-07-04

## 2018-07-04 RX ORDER — MORPHINE SULFATE 4 MG/ML
4 INJECTION, SOLUTION INTRAMUSCULAR; INTRAVENOUS
Status: DISCONTINUED | OUTPATIENT
Start: 2018-07-04 | End: 2018-07-04 | Stop reason: HOSPADM

## 2018-07-04 RX ORDER — MORPHINE SULFATE 15 MG/1
15 TABLET, FILM COATED, EXTENDED RELEASE ORAL EVERY 12 HOURS SCHEDULED
Status: DISCONTINUED | OUTPATIENT
Start: 2018-07-04 | End: 2018-07-04 | Stop reason: HOSPADM

## 2018-07-04 RX ORDER — ONDANSETRON 2 MG/ML
4 INJECTION INTRAMUSCULAR; INTRAVENOUS ONCE
Status: COMPLETED | OUTPATIENT
Start: 2018-07-04 | End: 2018-07-04

## 2018-07-04 RX ADMIN — ONDANSETRON 4 MG: 2 INJECTION INTRAMUSCULAR; INTRAVENOUS at 11:28

## 2018-07-04 RX ADMIN — ONDANSETRON 4 MG: 2 INJECTION INTRAMUSCULAR; INTRAVENOUS at 07:42

## 2018-07-04 RX ADMIN — LORAZEPAM 1 MG: 2 INJECTION INTRAMUSCULAR; INTRAVENOUS at 08:42

## 2018-07-04 RX ADMIN — ONDANSETRON 4 MG: 2 INJECTION INTRAMUSCULAR; INTRAVENOUS at 08:10

## 2018-07-04 RX ADMIN — MORPHINE SULFATE 4 MG: 4 INJECTION INTRAVENOUS at 11:55

## 2018-07-04 RX ADMIN — MORPHINE SULFATE 4 MG: 4 INJECTION INTRAVENOUS at 08:42

## 2018-07-04 RX ADMIN — ONDANSETRON 4 MG: 2 INJECTION INTRAMUSCULAR; INTRAVENOUS at 11:55

## 2018-07-04 RX ADMIN — SODIUM CHLORIDE 1000 ML: 9 INJECTION, SOLUTION INTRAVENOUS at 07:42

## 2018-07-04 RX ADMIN — MORPHINE SULFATE 15 MG: 15 TABLET, EXTENDED RELEASE ORAL at 07:43

## 2018-07-04 ASSESSMENT — ENCOUNTER SYMPTOMS
VOMITING: 1
NAUSEA: 1
ABDOMINAL PAIN: 1
RESPIRATORY NEGATIVE: 1
APPETITE CHANGE: 1

## 2018-07-04 NOTE — ED AVS SNAPSHOT
[facility], Emergency Department    [address] [facility] [address]    Phone:  [phone]                                       Sonya Foote   MRN: [id number]    Department:  [facility], Emergency Department   Date of Visit:  7/4/2018           Patient Information     Date Of Birth          1950        Your diagnoses for this visit were:     Non-intractable vomiting with nausea, unspecified vomiting type     Opiate withdrawal (H)        You were seen by Bj Kenyon MD.        Discharge Instructions       I think your symptoms are from opiate withdrawal  Start your morphine as prescribed by your pain clinic    Your next 10 appointments already scheduled     Jul 05, 2018  9:45 AM CDT   (Arrive by 9:30 AM)   Return Visit with Elizabeth Oliver MD   [facility] ([facility] [place])    [address] [place] [address]  4th Floor  [facility] [id number] [phone]            Jul 26, 2018 10:15 AM CDT   RETURN GLAUCOMA with Marely Robin MD   [facility] ([facility])    [address]  [address] [address]  [facility] [id number] [phone]            Aug 13, 2018  9:00 AM CDT   (Arrive by 8:45 AM)   Return Visit with Alina Jennings MD   [facility] ([place] [facility])    [address] [place] [address]  [address]  [facility] [address] [phone]              24 Hour Appointment Hotline       To make an appointment at any [facility], call [phone] ([phone]). If you don't have a family doctor or clinic, we will help you find one. [facility] are conveniently located to serve the needs of you and your family.             Review of your medicines      Our records show that you are taking the medicines listed below. If these are incorrect, please call your family doctor or clinic.        Dose /  Directions Last dose taken    ACETAMINOPHEN PO   Dose:  1000 mg        Take 1,000 mg by mouth every 4 hours as needed for pain Not to exceed 4000 mg/day   Refills:  0        ADVAIR DISKUS 250-50 MCG/DOSE diskus inhaler   Dose:  1 puff   Quantity:  60 Inhaler   Generic drug:  fluticasone-salmeterol        Inhale 1 puff into the lungs 2 times daily   Refills:  11        * albuterol (2.5 MG/3ML) 0.083% neb solution   Quantity:  360 mL        INHALE 1 VIAL VIA NEBULIZER EVERY 6 HOURS AS NEEDED   Refills:  11        * VENTOLIN  (90 Base) MCG/ACT Inhaler   Dose:  2 puff   Quantity:  3 Inhaler   Generic drug:  albuterol        Inhale 2 puffs into the lungs every 6 hours as needed for shortness of breath / dyspnea or wheezing   Refills:  5        alendronate 70 MG tablet   Commonly known as:  FOSAMAX   Quantity:  12 tablet        Take 1 tablet (70 mg) by mouth with 8oz water every 7 days 30 minutes before breakfast and remain upright during this time.   Refills:  3        aspirin 81 MG EC tablet   Dose:  81 mg   Quantity:  90 tablet        Take 1 tablet (81 mg) by mouth daily   Refills:  3        atorvastatin 40 MG tablet   Commonly known as:  LIPITOR   Dose:  40 mg   Quantity:  90 tablet        Take 1 tablet (40 mg) by mouth daily   Refills:  2        blood glucose monitoring lancets   Quantity:  100 each        Use to test blood sugar 3 times daily or as directed.   Refills:  PRN        blood glucose monitoring meter device kit   Quantity:  1 kit        Use to test blood sugars 3 times daily or as directed.   Refills:  0        blood glucose monitoring test strip   Commonly known as:  ONETOUCH ULTRA   Quantity:  3 Box        Use to test blood sugars 3 times daily or as directed.   Refills:  3        brimonidine 0.2 % ophthalmic solution   Commonly known as:  ALPHAGAN   Dose:  1 drop   Quantity:  1 Bottle        Place 1 drop into the right eye 2 times daily   Refills:  11        calcium citrate-vitamin D 315-250  MG-UNIT Tabs per tablet   Commonly known as:  CITRACAL   Dose:  2 tablet   Quantity:  120 tablet        Take 2 tablets by mouth daily   Refills:  5        cetirizine 10 MG tablet   Commonly known as:  zyrTEC   Dose:  10 mg   Quantity:  90 tablet        Take 1 tablet (10 mg) by mouth daily as needed for allergies   Refills:  3        CYANOCOBALAMIN PO   Dose:  2000 mcg        Take 2,000 mcg by mouth daily   Refills:  0        diclofenac 1 % Gel topical gel   Commonly known as:  VOLTAREN   Dose:  2 g   Quantity:  100 g        Apply 2 g topically 4 times daily to hands   Refills:  11        dorzolamide 2 % ophthalmic solution   Commonly known as:  TRUSOPT   Dose:  1 drop   Quantity:  1 Bottle        Place 1 drop into the right eye 2 times daily   Refills:  11        EPINEPHrine 0.3 MG/0.3ML injection 2-pack   Commonly known as:  EPIPEN/ADRENACLICK/or ANY BX GENERIC EQUIV   Dose:  0.3 mg   Quantity:  0.6 mL        Inject 0.3 mLs (0.3 mg) into the muscle as needed for anaphylaxis   Refills:  1        escitalopram 10 MG tablet   Commonly known as:  LEXAPRO   Dose:  10 mg        Take 10 mg by mouth daily   Refills:  0        ferrous sulfate 325 (65 Fe) MG tablet   Commonly known as:  IRON   Dose:  325 mg   Quantity:  60 tablet        Take 1 tablet (325 mg) by mouth 2 times daily With meals   Refills:  2        fluticasone 50 MCG/ACT spray   Commonly known as:  FLONASE   Dose:  1 spray        Spray 1 spray into both nostrils daily   Refills:  0        hydrochlorothiazide 12.5 MG capsule   Commonly known as:  MICROZIDE   Dose:  12.5 mg   Quantity:  90 capsule        Take 1 capsule (12.5 mg) by mouth daily   Refills:  2        INCRUSE ELLIPTA 62.5 MCG/INH oral inhaler   Dose:  1 puff   Generic drug:  umeclidinium        Inhale 1 puff into the lungs daily   Refills:  0        lactulose 10 GM/15ML solution   Commonly known as:  CHRONULAC   Dose:  20 g        Take 20 g by mouth as needed for constipation   Refills:  0         latanoprost 0.005 % ophthalmic solution   Commonly known as:  XALATAN   Dose:  1 drop   Quantity:  2.5 mL        Place 1 drop into both eyes At Bedtime   Refills:  11        levETIRAcetam 500 MG tablet   Commonly known as:  KEPPRA   Dose:  500 mg   Quantity:  180 tablet        Take 1 tablet (500 mg) by mouth 2 times daily   Refills:  1        lidocaine 5 % Patch   Commonly known as:  LIDODERM   Quantity:  30 patch        APPLY 1 TO 3 PATCHES TO PAINFUL AREA AT ONCE FOR UP TO 12 HOURS WITHIN A 24 HOUR PERIOD REMOVE AFTER 12 HOURS   Refills:  5        lisinopril 20 MG tablet   Commonly known as:  PRINIVIL/ZESTRIL   Dose:  20 mg   Quantity:  90 tablet        Take 1 tablet (20 mg) by mouth daily   Refills:  0        MEDICATION GIVEN BY INTRATHECAL PUMP - INSTRUCTION        continuous 2/8/18: Pump managed by [facility] in [place] [phone].  Conc: Bupivacaine 20 mg/mL and Fentanyl 2000 mcg/mL, morphine 2 mg/mL Continuous:  Fentanyl 796 mcg/day, Bupivacaine 7.96 mg/day, Morphine 0.796 mg/day  Boluses: Up to 7 boluses per 24-hr period of Bupivicaine 0.5994 mg and Fentanyl 59.9 mcg morphine 0.0599 mg. Pump Refill Date 02/28/18   Refills:  0        metFORMIN 500 MG 24 hr tablet   Commonly known as:  GLUCOPHAGE-XR   Dose:  500 mg   Quantity:  90 tablet        Take 1 tablet (500 mg) by mouth daily (with dinner)   Refills:  3        metoprolol succinate 50 MG 24 hr tablet   Commonly known as:  TOPROL-XL   Dose:  50 mg   Quantity:  90 tablet        Take 1 tablet (50 mg) by mouth daily   Refills:  0        MULTIVITAMIN PO   Dose:  1 tablet        Take 1 tablet by mouth daily   Refills:  0        nicotine 14 MG/24HR 24 hr patch   Commonly known as:  NICODERM CQ   Dose:  1 patch   Quantity:  30 patch        Place 1 patch onto the skin daily   Refills:  0        nitroGLYcerin 0.4 MG sublingual tablet   Commonly known as:  NITROSTAT   Dose:  0.4 mg   Quantity:  25 tablet        Place 1 tablet (0.4 mg)  under the tongue every 5 minutes as needed for chest pain if you are still having symptoms after 3 doses (15 minutes) call 911.   Refills:  1        ondansetron 8 MG ODT tab   Commonly known as:  ZOFRAN-ODT   Dose:  8 mg   Quantity:  30 tablet        Take 1 tablet (8 mg) by mouth every 8 hours as needed   Refills:  5        order for DME   Quantity:  1 Month        Equipment being ordered: Depends briefs   Refills:  11        order for DME   Quantity:  1 Device        Equipment being ordered: Power Wheelchair   Refills:  0        order for DME   Quantity:  1 Box        Equipment being ordered: Glucerna daily shakes with each meal   Refills:  11        * order for DME   Quantity:  1 Units        Equipment being ordered: Nebulizer and tubing supplies   Refills:  0        * order for DME   Quantity:  1 Device        Equipment being ordered: CPAP supplies mask, hose, filters, cushion fax to [facility] at [phone] Disp: 10 DeviceRefills: prn Class: Local PrintStart: 2/10/2017   Refills:  0        * order for DME   Quantity:  10 Device        Equipment being ordered: CPAP supplies mask, hose, filters, cushion  fax to [facility] at [phone]   Refills:  prn        * order for DME   Quantity:  1 Device        Hospital bed with side rails   Refills:  0        * order for DME   Quantity:  1 Device        Full electric hospital bed with half rails  Dx: U10460, I110, J449 Length of need: lifetime   Refills:  0        * order for DME   Quantity:  1 Device        Wheel Chair Cushion: 18 x 18 inch Roho cushion   Refills:  0        order for DME   Quantity:  1 Package        Equipment being ordered: bandage tape, prefer paper   Refills:  0        oxyCODONE IR 5 MG tablet   Commonly known as:  ROXICODONE   Dose:  5 mg   Quantity:  5 tablet        Take 1 tablet (5 mg) by mouth every 6 hours as needed for pain   Refills:  0        pantoprazole 40 MG EC tablet   Commonly known as:  PROTONIX   Dose:  40 mg   Quantity:  30  tablet        Take 1 tablet (40 mg) by mouth daily   Refills:  5        Phenytoin Sodium Extended 200 MG Caps   Dose:  200 mg   Quantity:  60 capsule        Take 200 mg by mouth 2 times daily   Refills:  0        polyethylene glycol Packet   Commonly known as:  MIRALAX/GLYCOLAX   Dose:  17 g        Take 17 g by mouth daily Dissolved in water or juice   Refills:  0        pregabalin 75 MG capsule   Commonly known as:  LYRICA   Dose:  150 mg   Quantity:  120 capsule        Take 2 capsules (150 mg) by mouth 2 times daily   Refills:  5        risperiDONE 0.5 MG tablet   Commonly known as:  risperDAL   Dose:  0.5 mg        Take 0.5 mg by mouth At Bedtime   Refills:  0        sucralfate 1 GM tablet   Commonly known as:  CARAFATE   Dose:  1 g   Quantity:  120 tablet        Take 1 tablet (1 g) by mouth 4 times daily May dissolve in 10 mL water is necessary. (Start upon completion of carafate suspension)   Refills:  11        tetracycline 500 MG capsule   Commonly known as:  ACHROMYCIN/SUMYCIN   Dose:  500 mg   Quantity:  28 capsule        Take 1 capsule (500 mg) by mouth 2 times daily   Refills:  0        traZODone 50 MG tablet   Commonly known as:  DESYREL   Dose:  150 mg        Take 150 mg by mouth At Bedtime   Refills:  0        vitamin D 2000 units tablet   Dose:  2000 Units   Quantity:  100 tablet        Take 2,000 Units by mouth daily.   Refills:  3        zinc 50 MG Tabs   Dose:  1 tablet        Take 1 tablet by mouth daily   Refills:  0        * Notice:  This list has 8 medication(s) that are the same as other medications prescribed for you. Read the directions carefully, and ask your doctor or other care provider to review them with you.            Procedures and tests performed during your visit     CBC with platelets differential    Comprehensive metabolic panel    Lipase      Orders Needing Specimen Collection     None      Pending Results     No orders found from 7/2/2018 to 7/5/2018.            Pending Culture  Results     No orders found from 7/2/2018 to 7/5/2018.            Pending Results Instructions     If you had any lab results that were not finalized at the time of your Discharge, you can call the ED Lab Result RN at [phone]. You will be contacted by this team for any positive Lab results or changes in treatment. The nurses are available 7 days a week from 10A to 6:30P.  You can leave a message 24 hours per day and they will return your call.        Thank you for choosing [facility]       Thank you for choosing [facility] for your care. Our goal is always to provide you with excellent care. Hearing back from our patients is one way we can continue to improve our services. Please take a few minutes to complete the written survey that you may receive in the mail after you visit with us. Thank you!        Care EveryWhere ID     This is your Care EveryWhere ID. This could be used by other organizations to access your [facility] medical records  [id number]        Equal Access to Services     KARI CARREON : John Cohen, reji glez, radha encarnacion, tonie marroquin. So [facility] [phone].    ATENCIÓN: Si habla español, tiene a briones disposición servicios gratuitos de asistencia lingüística. Llame al [phone].    We comply with applicable federal civil rights laws and [place] laws. We do not discriminate on the basis of race, color, national origin, age, disability, sex, sexual orientation, or gender identity.            After Visit Summary       This is your record. Keep this with you and show to your community pharmacist(s) and doctor(s) at your next visit.

## 2018-07-04 NOTE — ED PROVIDER NOTES
History     Chief Complaint   Patient presents with     Nausea & Vomiting     HPI  Sonya Foote is a 68 year old female who presents with nausea and vomiting that started last evening.  She has a history of previous stroke, diabetes, bilateral lower extremity amputations, suprapubic catheter, and chronic pain.  She says she had her pain pump recently removed.  She says the place where she states is unable to get her chronic pain medication.  When asked what this was she says she is on morphine 15 mg.  She also has schizoaffective disorder, COPD, seizure disorder.  She complains of abdominal discomfort and poor oral intake.  She says her blood sugars have been in the 100s.  No chest pain.      This part of the document was transcribed by Angie Martinez, Medical Scribe.   I have reviewed the Medications, Allergies, Past Medical and Surgical History, and Social History in the [employer] system.  Past Medical History:   Diagnosis Date     Anemia      Antiplatelet or antithrombotic long-term use      Arthritis      AS (sickle cell trait) (H) 10/8/2013     Blind left eye      BPPV (benign paroxysmal positional vertigo)      Chronic back pain      COPD (chronic obstructive pulmonary disease) (H)      Coronary artery disease      CVA (cerebral infarction) 10/8/2012    x3, residual left sided weakness     Diastolic CHF (H) 9/4/2012     Dilantin toxicity 5/31/2013     Esophageal candidiasis (H)      GERD (gastroesophageal reflux disease)      Heart attack      Hernia, abdominal      History of blood transfusion     x5     History of thrombophlebitis      HTN (hypertension) 9/4/2012     Hyperlipidemia LDL goal <100 9/4/2012     Legally blind in right eye, as defined in [place]     No periphal vision right eye     Osteoporosis 1/21/2013     Other chronic pain      PAD (peripheral artery disease) (H)      Palpitations      Person who has had sex change operation 1/20/2014     Pneumonia      Schizoaffective disorder (H)      Seizure  disorder (H) 9/4/2012     Sleep apnea     CPAP     Thrombosis of leg      Type 2 diabetes, HbA1C goal < 8% (H) 9/4/2012     Uncomplicated asthma      Unspecified cerebral artery occlusion with cerebral infarction        Past Surgical History:   Procedure Laterality Date     AMPUTATE LEG ABOVE KNEE Left 6/11/2016    Procedure: AMPUTATE LEG ABOVE KNEE;  Surgeon: Mello Rodriguez MD;  [facility]     AMPUTATE LEG BELOW KNEE Right 11/7/2016    Procedure: AMPUTATE LEG BELOW KNEE;  Surgeon: Savannah Durant MD;  [facility]     AMPUTATE REVISION STUMP LOWER EXTREMITY Right 11/11/2016    Procedure: AMPUTATE REVISION STUMP LOWER EXTREMITY;  Surgeon: Savannah Durant MD;  [facility]     AMPUTATE REVISION STUMP LOWER EXTREMITY Right 11/16/2016    Procedure: AMPUTATE REVISION STUMP LOWER EXTREMITY;  Surgeon: Savannah Durant MD;  [facility]     AMPUTATE TOE(S) Right 1/5/2016    Procedure: AMPUTATE TOE(S);  Surgeon: Mello Gaines DPM;  Location: [facility]     ANGIOGRAM Bilateral 11/21/2014    Procedure: ANGIOGRAM;  Surgeon: Savannah Durant MD;  [facility]     ANGIOGRAM Left 1/16/2015    Procedure: ANGIOGRAM;  Surgeon: Savannah Durant MD;  [facility]     ANGIOGRAM Bilateral 9/14/2015    Procedure: ANGIOGRAM;  Surgeon: Savannah Durant MD;  [facility]     ANGIOGRAM Left 10/12/2015    Procedure: ANGIOGRAM;  Surgeon: Savannah Durant MD;  [facility]     ANGIOGRAM Right 6/6/2016    Procedure: ANGIOGRAM;  Surgeon: Savannah Durant MD;  [facility]     ANGIOPLASTY Right 6/6/2016    Procedure: ANGIOPLASTY;  Surgeon: Savannah Durant MD;  [facility]     APPENDECTOMY       BREAST SURGERY      right breast bx (benign)     CATARACT IOL, RT/LT Right      CHOLECYSTECTOMY       COLONOSCOPY N/A 8/25/2014    Procedure: COLONOSCOPY;  Surgeon: Mello Ferrer MD;  [facility]     COLONOSCOPY WITH CO2 INSUFFLATION N/A 8/20/2014    Procedure: COLONOSCOPY WITH CO2 INSUFFLATION;  Surgeon: Duane, William Charles, MD;   [facility]     CYSTOSTOMY, INSERT TUBE SUPRAPUBIC, COMBINED N/A 1/16/2018    Procedure: COMBINED CYSTOSTOMY, INSERT TUBE SUPRAPUBIC;  Cystoscopy, Intraoperative Ultrasound, Suprapubic Tube Placement;  Surgeon: Keanu Dawson MD;  [facility]     ENDARTERECTOMY FEMORAL  5/23/2014    Procedure: ENDARTERECTOMY FEMORAL;  Surgeon: Jason Joshi MD;  [facility]     ESOPHAGOSCOPY, GASTROSCOPY, DUODENOSCOPY (EGD), COMBINED  12/14/2012    Procedure: COMBINED ESOPHAGOSCOPY, GASTROSCOPY, DUODENOSCOPY (EGD), BIOPSY SINGLE OR MULTIPLE;  ESOPHAGOSCOPY, GASTROSCOPY, DUODENOSCOPY (EGD), DILATATION ;  Surgeon: Elizabeth Stevenson MD;  Location:  GI     ESOPHAGOSCOPY, GASTROSCOPY, DUODENOSCOPY (EGD), COMBINED  12/31/2013    Procedure: COMBINED ESOPHAGOSCOPY, GASTROSCOPY, DUODENOSCOPY (EGD), BIOPSY SINGLE OR MULTIPLE;;  Surgeon: Clemente Lopez MD;  [facility]     ESOPHAGOSCOPY, GASTROSCOPY, DUODENOSCOPY (EGD), COMBINED  4/1/2014    Procedure: COMBINED ESOPHAGOSCOPY, GASTROSCOPY, DUODENOSCOPY (EGD);;  Surgeon: Clemente Lopez MD;  Location:  GI     ESOPHAGOSCOPY, GASTROSCOPY, DUODENOSCOPY (EGD), COMBINED  6/28/2014    Procedure: COMBINED ESOPHAGOSCOPY, GASTROSCOPY, DUODENOSCOPY (EGD);  Surgeon: Clemente Lopez MD;  [facility]     ESOPHAGOSCOPY, GASTROSCOPY, DUODENOSCOPY (EGD), COMBINED N/A 8/20/2014    Procedure: COMBINED ESOPHAGOSCOPY, GASTROSCOPY, DUODENOSCOPY (EGD), BIOPSY SINGLE OR MULTIPLE;  Surgeon: Duane, William Charles, MD;  [facility]     ESOPHAGOSCOPY, GASTROSCOPY, DUODENOSCOPY (EGD), COMBINED N/A 8/22/2014    Procedure: COMBINED ESOPHAGOSCOPY, GASTROSCOPY, DUODENOSCOPY (EGD), BIOPSY SINGLE OR MULTIPLE;  Surgeon: Mello Ferrer MD;  [facility]     ESOPHAGOSCOPY, GASTROSCOPY, DUODENOSCOPY (EGD), COMBINED N/A 10/2/2014    Procedure: COMBINED ESOPHAGOSCOPY, GASTROSCOPY, DUODENOSCOPY (EGD), BIOPSY SINGLE OR MULTIPLE;  Surgeon: Remy Haskins MD;  Location:  GI      ESOPHAGOSCOPY, GASTROSCOPY, DUODENOSCOPY (EGD), COMBINED Left 12/15/2014    Procedure: COMBINED ESOPHAGOSCOPY, GASTROSCOPY, DUODENOSCOPY (EGD), BIOPSY SINGLE OR MULTIPLE;  Surgeon: Remy Haskins MD;  [facility]     ESOPHAGOSCOPY, GASTROSCOPY, DUODENOSCOPY (EGD), COMBINED N/A 2/25/2015    Procedure: COMBINED ENDOSCOPIC ULTRASOUND, ESOPHAGOSCOPY, GASTROSCOPY, DUODENOSCOPY (EGD), FINE NEEDLE ASPIRATE/BIOPSY;  Surgeon: Clemente Lugo MD;  [facility]     ESOPHAGOSCOPY, GASTROSCOPY, DUODENOSCOPY (EGD), COMBINED Left 2/25/2015    Procedure: COMBINED ESOPHAGOSCOPY, GASTROSCOPY, DUODENOSCOPY (EGD), BIOPSY SINGLE OR MULTIPLE;  Surgeon: Clemente Lugo MD;  [facility]     ESOPHAGOSCOPY, GASTROSCOPY, DUODENOSCOPY (EGD), COMBINED N/A 9/25/2016    Procedure: COMBINED ESOPHAGOSCOPY, GASTROSCOPY, DUODENOSCOPY (EGD);  Surgeon: Aziza Patiño MD;  [facility]     ESOPHAGOSCOPY, GASTROSCOPY, DUODENOSCOPY (EGD), COMBINED N/A 1/18/2017    Procedure: COMBINED ESOPHAGOSCOPY, GASTROSCOPY, DUODENOSCOPY (EGD), BIOPSY SINGLE OR MULTIPLE;  Surgeon: Clemente Lopez MD;  [facility]     ESOPHAGOSCOPY, GASTROSCOPY, DUODENOSCOPY (EGD), COMBINED N/A 11/26/2017    Procedure: COMBINED ESOPHAGOSCOPY, GASTROSCOPY, DUODENOSCOPY (EGD), REMOVE FOREIGN BODY;  Esophagogastroduodenoscopy with foreign body extraction  ;  Surgeon: Herberth Castrejon MD;  [facility]     ESOPHAGOSCOPY, GASTROSCOPY, DUODENOSCOPY (EGD), COMBINED N/A 11/26/2017    Procedure: COMBINED ESOPHAGOSCOPY, GASTROSCOPY, DUODENOSCOPY (EGD), REMOVE FOREIGN BODY;;  Surgeon: Herberth Castrejon MD;  [facility]     FASCIOTOMY LOWER EXTREMITY Left 6/10/2016    Procedure: FASCIOTOMY LOWER EXTREMITY;  Surgeon: Mello Rodriguez MD;  [facility]     HC CAPSULE ENDOSCOPY N/A 8/25/2014    Procedure: CAPSULE/PILL CAM ENDOSCOPY;  Surgeon: Remy Haskins MD;  [facility]     HC CAPSULE ENDOSCOPY N/A 10/2/2014    Procedure:  CAPSULE/PILL CAM ENDOSCOPY;  Surgeon: Remy Haskins MD;  Location:  GI     ORTHOPEDIC SURGERY      broken wrist repair     SEX TRANSFORMATION SURGERY, MALE TO FEMALE      1974     SINUS SURGERY      cyst removed     TONSILLECTOMY       VASCULAR SURGERY      Left carotid stent       Family History   Problem Relation Age of Onset     Dementia Mother      Glaucoma Mother      Diabetes Mother      may have been type 1, childhood     Coronary Artery Disease Mother      MI     Glaucoma Father      Diabetes Father      may hev been type 1 - ??     Heart Failure Father      Cancer Maternal Aunt      leukemia     Schizophrenia Brother      Depression Brother      Suicide Sister      Depression Sister      Diabetes Sister      Cancer Maternal Aunt      ovarian     Glaucoma Maternal Grandmother      Diabetes Maternal Grandmother      Glaucoma Maternal Grandfather      Diabetes Maternal Grandfather      Glaucoma Paternal Grandmother      Diabetes Paternal Grandmother      Glaucoma Paternal Grandfather      Diabetes Paternal Grandfather      Breast Cancer Sister      Cerebrovascular Disease Brother      Colon Cancer No family hx of      Macular Degeneration No family hx of        Social History   Substance Use Topics     Smoking status: Current Every Day Smoker     Packs/day: 0.25     Years: 30.00     Types: Cigarettes, Cigars     Last attempt to quit: 11/1/2001     Smokeless tobacco: Never Used     Alcohol use No       Current Facility-Administered Medications   Medication     morphine (MS CONTIN) 12 hr tablet 15 mg     morphine (PF) injection 4 mg     ondansetron (ZOFRAN) injection 4 mg     ondansetron (ZOFRAN) injection 4 mg     sodium chloride 0.9% infusion     Current Outpatient Prescriptions   Medication     ACETAMINOPHEN PO     ADVAIR DISKUS 250-50 MCG/DOSE diskus inhaler     albuterol (2.5 MG/3ML) 0.083% neb solution     alendronate (FOSAMAX) 70 MG tablet     aspirin EC 81 MG EC tablet     atorvastatin  (LIPITOR) 40 MG tablet     blood glucose monitoring (ONE TOUCH ULTRA 2) meter device kit     blood glucose monitoring (ONE TOUCH ULTRA) test strip     blood glucose monitoring (ONE TOUCH ULTRASOFT) lancets     brimonidine (ALPHAGAN) 0.2 % ophthalmic solution     calcium citrate-vitamin D (CITRACAL) 315-250 MG-UNIT TABS     cetirizine (ZYRTEC) 10 MG tablet     Cholecalciferol (VITAMIN D) 2000 UNITS tablet     CYANOCOBALAMIN PO     diclofenac (VOLTAREN) 1 % GEL topical gel     dorzolamide (TRUSOPT) 2 % ophthalmic solution     EPINEPHrine (EPIPEN/ADRENACLICK/OR ANY BX GENERIC EQUIV) 0.3 MG/0.3ML injection 2-pack     escitalopram (LEXAPRO) 10 MG tablet     ferrous sulfate (IRON) 325 (65 FE) MG tablet     fluticasone (FLONASE) 50 MCG/ACT nasal spray     hydrochlorothiazide (MICROZIDE) 12.5 MG capsule     lactulose (CHRONULAC) 10 GM/15ML solution     latanoprost (XALATAN) 0.005 % ophthalmic solution     levETIRAcetam (KEPPRA) 500 MG tablet     lidocaine (LIDODERM) 5 % Patch     lisinopril (PRINIVIL/ZESTRIL) 20 MG tablet     MEDICATION GIVEN BY INTRATHECAL PUMP - INSTRUCTION     metFORMIN (GLUCOPHAGE-XR) 500 MG 24 hr tablet     metoprolol succinate (TOPROL-XL) 50 MG 24 hr tablet     Multiple Vitamin (MULTIVITAMIN OR)     nicotine (NICODERM CQ) 14 MG/24HR 24 hr patch     nitroglycerin (NITROSTAT) 0.4 MG SL tablet     ondansetron (ZOFRAN-ODT) 8 MG ODT tab     order for DME     order for DME     order for DME     order for DME     order for DME     order for DME     order for DME     order for DME     ORDER FOR DME     ORDER FOR DME     oxyCODONE IR (ROXICODONE) 5 MG tablet     pantoprazole (PROTONIX) 40 MG EC tablet     phenytoin 200 MG CAPS     polyethylene glycol (MIRALAX/GLYCOLAX) Packet     pregabalin (LYRICA) 75 MG capsule     risperiDONE (RISPERDAL) 0.5 MG tablet     sucralfate (CARAFATE) 1 GM tablet     tetracycline (ACHROMYCIN/SUMYCIN) 500 MG capsule     traZODone (DESYREL) 50 MG tablet     umeclidinium (INCRUSE  "ELLIPTA) 62.5 MCG/INH oral inhaler     VENTOLIN  (90 BASE) MCG/ACT Inhaler     zinc 50 MG TABS     Facility-Administered Medications Ordered in Other Encounters   Medication     glycopyrrolate (ROBINUL) injection     neostigmine (PROSTIGMINE) injection        Allergies   Allergen Reactions     Bee Venom      Penicillins Anaphylaxis     Other reaction(s): Skin Rash and/or Hives     Dilantin [Phenytoin] Other (See Comments)     Generic dilantin only per pt     Iodide Hives     09/11/15: Pt states she can use iodine and doesn't have any problems      Iodine-131      Novocaine [Procaine] Hives     Other reaction(s): Skin Rash and/or Hives     Tositumomab        Review of Systems   Constitutional: Positive for appetite change (poor PO intake).   Respiratory: Negative.    Cardiovascular: Negative for chest pain.   Gastrointestinal: Positive for abdominal pain, nausea and vomiting.   Genitourinary: Negative.    All other systems reviewed and are negative.      Physical Exam   BP: (!) 142/103  Pulse: 91  Temp: 97.8  F (36.6  C)  Resp: 19  Height: 109.2 cm (3' 7\")  Weight: 62.6 kg (138 lb)  SpO2: 100 %      Physical Exam   Constitutional: She has a sickly appearance. She appears distressed.   HENT:   Mouth/Throat: Oropharynx is clear and moist.   Neck: Neck supple.   Cardiovascular: Normal rate, regular rhythm and normal heart sounds.    Pulmonary/Chest: Effort normal and breath sounds normal.   Abdominal: Soft. She exhibits no distension. There is no tenderness.   Musculoskeletal:   B LE amps   Neurological: She is alert.   Skin: Skin is warm.   Psychiatric: She has a normal mood and affect. Her behavior is normal.   Nursing note and vitals reviewed.      ED Course     ED Course     Procedures        Medications   0.9% sodium chloride BOLUS (1,000 mLs Intravenous New Bag 7/4/18 8457)     Followed by   sodium chloride 0.9% infusion (not administered)   ondansetron (ZOFRAN) injection 4 mg (4 mg Intravenous Given 7/4/18 " 0810)   morphine (MS CONTIN) 12 hr tablet 15 mg (15 mg Oral Given 7/4/18 0743)   ondansetron (ZOFRAN) injection 4 mg (not administered)   morphine (PF) injection 4 mg (4 mg Intravenous Given 7/4/18 0842)   LORazepam (ATIVAN) injection 1 mg (1 mg Intravenous Given 7/4/18 0842)     10:47 AM Feels better ready for discharge              Labs Ordered and Resulted from Time of ED Arrival Up to the Time of Departure from the ED   CBC WITH PLATELETS DIFFERENTIAL - Abnormal; Notable for the following:        Result Value    WBC 13.9 (*)     Absolute Neutrophil 10.9 (*)     All other components within normal limits   COMPREHENSIVE METABOLIC PANEL - Abnormal; Notable for the following:     Carbon Dioxide 18 (*)     Glucose 175 (*)     Albumin 3.3 (*)     Alkaline Phosphatase 292 (*)     ALT 66 (*)     AST 53 (*)     All other components within normal limits   LIPASE            Assessments & Plan (with Medical Decision Making)   68 year old female recently had explanation of her pain pump for chronic back pain. She now has nausea and vomiting. Clinical appearance was consistent with opiate withdrawal. She was given fluids, anti-emetics, and what I believe is her new home dose of morphine as well as a few doses of IV morphine. Her symptoms have abated. It is my understanding that her care facility should have her pain medication available. She feels better at 10:49 AM and will be discharged home. I will not provide additional opiates as she is to have a prescription available to her.     I have reviewed the nursing notes.    I have reviewed the findings, diagnosis, plan and need for follow up with the patient.  This part of the document was transcribed by Angie Martinez Medical Scribe.   New Prescriptions    No medications on file       Final diagnoses:   Non-intractable vomiting with nausea, unspecified vomiting type   Opiate withdrawal (H)       7/4/2018   [facility], [place], EMERGENCY DEPARTMENT     Bj Kenyon,  MD  07/04/18 1105

## 2018-07-04 NOTE — DISCHARGE INSTRUCTIONS
I think your symptoms are from opiate withdrawal  Start your morphine as prescribed by your pain clinic

## 2018-07-04 NOTE — ED NOTES
Bed: ED11  Expected date:   Expected time:   Means of arrival:   Comments:  N711 - 69F with N/V - triaged yellow

## 2018-07-04 NOTE — ED AVS SNAPSHOT
[facility], [place], Emergency Department    [address] [place] [id number]    Phone:  [phone]                                       Sonya Foote   MRN: [id number]    Department:  [facility], Emergency Department   Date of Visit:  7/4/2018           After Visit Summary Signature Page     I have received my discharge instructions, and my questions have been answered. I have discussed any challenges I see with this plan with the nurse or doctor.    ..........................................................................................................................................  Patient/Patient Representative Signature      ..........................................................................................................................................  Patient Representative Print Name and Relationship to Patient    ..................................................               ................................................  Date                                            Time    ..........................................................................................................................................  Reviewed by Signature/Title    ...................................................              ..............................................  Date                                                            Time

## 2018-07-04 NOTE — ED TRIAGE NOTES
Pt recently has been having issues with her pain pump for chronic back pain. Now she is having N/V. Pt appears to possibly be withdrawing from her pain meds

## 2018-07-05 ENCOUNTER — PATIENT OUTREACH (OUTPATIENT)
Dept: GERIATRIC MEDICINE | Facility: CLINIC | Age: 68
End: 2018-07-05

## 2018-07-05 ENCOUNTER — OFFICE VISIT (OUTPATIENT)
Dept: UROLOGY | Facility: CLINIC | Age: 68
End: 2018-07-05
Payer: COMMERCIAL

## 2018-07-05 VITALS
HEIGHT: 55 IN | BODY MASS INDEX: 31.94 KG/M2 | HEART RATE: 82 BPM | DIASTOLIC BLOOD PRESSURE: 66 MMHG | SYSTOLIC BLOOD PRESSURE: 106 MMHG | WEIGHT: 138 LBS

## 2018-07-05 DIAGNOSIS — H40.10X0 OPEN-ANGLE GLAUCOMA, UNSPECIFIED GLAUCOMA STAGE, UNSPECIFIED LATERALITY, UNSPECIFIED OPEN-ANGLE GLAUCOMA TYPE: ICD-10-CM

## 2018-07-05 DIAGNOSIS — Z87.440 PERSONAL HISTORY OF URINARY TRACT INFECTION: ICD-10-CM

## 2018-07-05 DIAGNOSIS — I73.9 CLAUDICATION IN PERIPHERAL VASCULAR DISEASE (H): ICD-10-CM

## 2018-07-05 DIAGNOSIS — N32.89 BLADDER SPASMS: ICD-10-CM

## 2018-07-05 DIAGNOSIS — R39.81 FUNCTIONAL INCONTINENCE: Primary | ICD-10-CM

## 2018-07-05 ASSESSMENT — PAIN SCALES - GENERAL: PAINLEVEL: NO PAIN (0)

## 2018-07-05 NOTE — MR AVS SNAPSHOT
After Visit Summary   7/5/2018    Sonya Foote    MRN: [id number]           Patient Information     Date Of Birth          1950        Visit Information        Provider Department      7/5/2018 9:45 AM Elizabeth Oliver MD [facility]        Today's Diagnoses     Functional incontinence    -  1    Bladder spasms        Personal history of urinary tract infection        Open-angle glaucoma, unspecified glaucoma stage, unspecified laterality, unspecified open-angle glaucoma type        Claudication in peripheral vascular disease (H)          Care Instructions    Supplements to prevent UTI  -probiotics  -cranberry   Ellura: [email or web address]   Theracran HP by Theralogix  -d-mannose    Websites with free information:    American Urogynecologic Society patient website: www.voicesforpfd.org    Total Control Program: www.totalcontrolprogram.[email or web address]    Options we discussed:  -Anticholinergic medications: cannot be given with glaucoma unless approval from you eye doctor  -B3 agonist: cannot be given if you have high blood pressure issues  -Botulinum toxin in the bladder could be considered but may want to do urodynamics testing prior    Good luck in [place] and let us know if we can be of any help    It was a pleasure meeting with you today.  Thank you for allowing me and my team the privilege of caring for you today.  YOU are the reason we are here, and I truly hope we provided you with the excellent service you deserve.  Please let us know if there is anything else we can do for you so that we can be sure you are leaving completely satisfied with your care experience.              Follow-ups after your visit        Your next 10 appointments already scheduled     Jul 26, 2018 10:15 AM CDT   RETURN GLAUCOMA with Marely Robin MD   Eye Clinic ([facility])    [address]  [address]  [facility] [address]  "  [phone]            Aug 13, 2018  9:00 AM CDT   (Arrive by 8:45 AM)   Return Visit with Alina Jennings MD   [facility] ([facility])    [address] [facility]  [address] [place] [address] [phone]              Who to contact     Please call your clinic at [phone] to:    Ask questions about your health    Make or cancel appointments    Discuss your medicines    Learn about your test results    Speak to your doctor            Additional Information About Your Visit        Care EveryWhere ID     This is your Care EveryWhere ID. This could be used by other organizations to access your [facility] medical records  [id number]        Your Vitals Were     Pulse Height BMI (Body Mass Index)             82 1.092 m (3' 7\") 52.47 kg/m2          Blood Pressure from Last 3 Encounters:   07/05/18 106/66   07/04/18 105/60   06/26/18 92/58    Weight from Last 3 Encounters:   07/05/18 62.6 kg (138 lb)   07/04/18 62.6 kg (138 lb)   06/26/18 62.6 kg (138 lb)              Today, you had the following     No orders found for display         Today's Medication Changes          These changes are accurate as of 7/5/18 10:37 AM.  If you have any questions, ask your nurse or doctor.               These medicines have changed or have updated prescriptions.        Dose/Directions    Phenytoin Sodium Extended 200 MG Caps   This may have changed:  additional instructions   Used for:  Seizure disorder (H)        Dose:  200 mg   Take 200 mg by mouth 2 times daily   Quantity:  60 capsule   Refills:  0                Primary Care Provider Office Phone # Fax #    Allan Casey [phone]       [address] [facility] [address]        Equal Access to Services     KARI CARREON : John Cohen, reji glez, radha encarnacion, tonie marroquin. So [facility] [phone].    ATENCIÓN: Si " papito snider, tiene a briones disposición servicios gratuitos de asistencia lingüística. Meño shultz [phone].    We comply with applicable federal civil rights laws and [place] laws. We do not discriminate on the basis of race, color, national origin, age, disability, sex, sexual orientation, or gender identity.            Thank you!     Thank you for choosing [facility]  for your care. Our goal is always to provide you with excellent care. Hearing back from our patients is one way we can continue to improve our services. Please take a few minutes to complete the written survey that you may receive in the mail after your visit with us. Thank you!             Your Updated Medication List - Protect others around you: Learn how to safely use, store and throw away your medicines at www.disposemymeds.org.          This list is accurate as of 7/5/18 10:37 AM.  Always use your most recent med list.                   Brand Name Dispense Instructions for use Diagnosis    ACETAMINOPHEN PO      Take 1,000 mg by mouth every 4 hours as needed for pain Not to exceed 4000 mg/day        ADVAIR DISKUS 250-50 MCG/DOSE diskus inhaler   Generic drug:  fluticasone-salmeterol     60 Inhaler    Inhale 1 puff into the lungs 2 times daily    Acute bronchitis       * albuterol (2.5 MG/3ML) 0.083% neb solution     360 mL    INHALE 1 VIAL VIA NEBULIZER EVERY 6 HOURS AS NEEDED    Chronic obstructive pulmonary disease, unspecified COPD type (H)       * VENTOLIN  (90 Base) MCG/ACT Inhaler   Generic drug:  albuterol     3 Inhaler    Inhale 2 puffs into the lungs every 6 hours as needed for shortness of breath / dyspnea or wheezing    Chronic obstructive pulmonary disease, unspecified COPD type (H)       alendronate 70 MG tablet    FOSAMAX    12 tablet    Take 1 tablet (70 mg) by mouth with 8oz water every 7 days 30 minutes before breakfast and remain upright during this time.    Age-related  osteoporosis without current pathological fracture       aspirin 81 MG EC tablet     90 tablet    Take 1 tablet (81 mg) by mouth daily    Unstable angina (H)       atorvastatin 40 MG tablet    LIPITOR    90 tablet    Take 1 tablet (40 mg) by mouth daily    Hyperlipidemia LDL goal <100       blood glucose monitoring lancets     100 each    Use to test blood sugar 3 times daily or as directed.    Type 2 diabetes mellitus with diabetic peripheral angiopathy without gangrene, without long-term current use of insulin (H)       blood glucose monitoring meter device kit     1 kit    Use to test blood sugars 3 times daily or as directed.    Type 2 diabetes, HbA1C goal < 8% (H)       blood glucose monitoring test strip    ONETOUCH ULTRA    3 Box    Use to test blood sugars 3 times daily or as directed.    Type 2 diabetes mellitus with diabetic peripheral angiopathy without gangrene, without long-term current use of insulin (H)       brimonidine 0.2 % ophthalmic solution    ALPHAGAN    1 Bottle    Place 1 drop into the right eye 2 times daily    Primary open angle glaucoma of both eyes, severe stage       calcium citrate-vitamin D 315-250 MG-UNIT Tabs per tablet    CITRACAL    120 tablet    Take 2 tablets by mouth daily    Osteoporosis       cetirizine 10 MG tablet    zyrTEC    90 tablet    Take 1 tablet (10 mg) by mouth daily as needed for allergies    Seasonal allergic rhinitis, unspecified allergic rhinitis trigger       CYANOCOBALAMIN PO      Take 2,000 mcg by mouth daily        diclofenac 1 % Gel topical gel    VOLTAREN    100 g    Apply 2 g topically 4 times daily to hands    Primary osteoarthritis of both hands       dorzolamide 2 % ophthalmic solution    TRUSOPT    1 Bottle    Place 1 drop into the right eye 2 times daily    Primary open angle glaucoma of both eyes, severe stage       EPINEPHrine 0.3 MG/0.3ML injection 2-pack    EPIPEN/ADRENACLICK/or ANY BX GENERIC EQUIV    0.6 mL    Inject 0.3 mLs (0.3 mg) into the  muscle as needed for anaphylaxis    Bee sting reaction, undetermined intent, subsequent encounter       escitalopram 10 MG tablet    LEXAPRO     Take 10 mg by mouth daily        ferrous sulfate 325 (65 Fe) MG tablet    IRON    60 tablet    Take 1 tablet (325 mg) by mouth 2 times daily With meals        fluticasone 50 MCG/ACT spray    FLONASE     Spray 1 spray into both nostrils daily        hydrochlorothiazide 12.5 MG capsule    MICROZIDE    90 capsule    Take 1 capsule (12.5 mg) by mouth daily    Essential hypertension with goal blood pressure less than 130/80       INCRUSE ELLIPTA 62.5 MCG/INH oral inhaler   Generic drug:  umeclidinium      Inhale 1 puff into the lungs daily        lactulose 10 GM/15ML solution    CHRONULAC     Take 20 g by mouth as needed for constipation        latanoprost 0.005 % ophthalmic solution    XALATAN    2.5 mL    Place 1 drop into both eyes At Bedtime    Primary open angle glaucoma of both eyes, severe stage       levETIRAcetam 500 MG tablet    KEPPRA    180 tablet    Take 1 tablet (500 mg) by mouth 2 times daily    Nausea       lidocaine 5 % Patch    LIDODERM    30 patch    APPLY 1 TO 3 PATCHES TO PAINFUL AREA AT ONCE FOR UP TO 12 HOURS WITHIN A 24 HOUR PERIOD REMOVE AFTER 12 HOURS    Chronic pain syndrome, 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 (H)       lisinopril 20 MG tablet    PRINIVIL/ZESTRIL    90 tablet    Take 1 tablet (20 mg) by mouth daily    History of coronary artery disease       MEDICATION GIVEN BY INTRATHECAL PUMP - INSTRUCTION      continuous 2/8/18: Pump managed by [facility] in [place] [phone].  Conc: Bupivacaine 20 mg/mL and Fentanyl 2000 mcg/mL, morphine 2 mg/mL Continuous:  Fentanyl 796 mcg/day, Bupivacaine 7.96 mg/day, Morphine 0.796 mg/day  Boluses: Up to 7 boluses per 24-hr period of Bupivicaine 0.5994 mg and Fentanyl 59.9 mcg morphine 0.0599 mg. Pump Refill Date 02/28/18        metFORMIN 500 MG 24 hr  tablet    GLUCOPHAGE-XR    90 tablet    Take 1 tablet (500 mg) by mouth daily (with dinner)    Type 2 diabetes mellitus with diabetic peripheral angiopathy and gangrene, without long-term current use of insulin (H)       metoprolol succinate 50 MG 24 hr tablet    TOPROL-XL    90 tablet    Take 1 tablet (50 mg) by mouth daily    History of coronary artery disease       MULTIVITAMIN PO      Take 1 tablet by mouth daily        nicotine 14 MG/24HR 24 hr patch    NICODERM CQ    30 patch    Place 1 patch onto the skin daily    Tobacco dependence syndrome       nitroGLYcerin 0.4 MG sublingual tablet    NITROSTAT    25 tablet    Place 1 tablet (0.4 mg) under the tongue every 5 minutes as needed for chest pain if you are still having symptoms after 3 doses (15 minutes) call 911.    Chronic systolic congestive heart failure (H), Old myocardial infarction       ondansetron 8 MG ODT tab    ZOFRAN-ODT    30 tablet    Take 1 tablet (8 mg) by mouth every 8 hours as needed    Other migraine without status migrainosus, not intractable       order for DME     1 Month    Equipment being ordered: Depends briefs    Incontinence       order for DME     1 Device    Equipment being ordered: Power Wheelchair    CVA (cerebral infarction), HTN (hypertension)       order for DME     1 Box    Equipment being ordered: Glucerna daily shakes with each meal    Type 2 diabetes mellitus with other diabetic neurological complication       * order for DME     1 Units    Equipment being ordered: Nebulizer and tubing supplies    Simple chronic bronchitis (H)       * order for DME     1 Device    Equipment being ordered: CPAP supplies mask, hose, filters, cushion fax to [facility] at [phone] Disp: 10 DeviceRefills: prn Class: Local PrintStart: 2/10/2017    Chronic obstructive pulmonary disease, unspecified COPD type (H)       * order for DME     10 Device    Equipment being ordered: CPAP supplies mask, hose, filters, cushion  fax to [facility] at [phone]    COPD (chronic obstructive pulmonary disease) (H)       * order for DME     1 Device    Hospital bed with side rails    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 (H)       * order for DME     1 Device    Full electric hospital bed with half rails  Dx: H66038, I110, J449 Length of need: lifetime    Status post bilateral above knee amputation (H)       * order for DME     1 Device    Wheel Chair Cushion: 18 x 18 inch Roho cushion    Status post bilateral above knee amputation (H)       order for DME     1 Package    Equipment being ordered: bandage tape, prefer paper    Burn of skin       oxyCODONE IR 5 MG tablet    ROXICODONE    5 tablet    Take 1 tablet (5 mg) by mouth every 6 hours as needed for pain    Bladder spasm       pantoprazole 40 MG EC tablet    PROTONIX    30 tablet    Take 1 tablet (40 mg) by mouth daily    Gastroesophageal reflux disease without esophagitis       Phenytoin Sodium Extended 200 MG Caps     60 capsule    Take 200 mg by mouth 2 times daily    Seizure disorder (H)       polyethylene glycol Packet    MIRALAX/GLYCOLAX     Take 17 g by mouth daily Dissolved in water or juice        pregabalin 75 MG capsule    LYRICA    120 capsule    Take 2 capsules (150 mg) by mouth 2 times daily    Pain in both upper extremities       risperiDONE 0.5 MG tablet    risperDAL     Take 0.5 mg by mouth At Bedtime        sucralfate 1 GM tablet    CARAFATE    120 tablet    Take 1 tablet (1 g) by mouth 4 times daily May dissolve in 10 mL water is necessary. (Start upon completion of carafate suspension)    Adjustment disorder with depressed mood       tetracycline 500 MG capsule    ACHROMYCIN/SUMYCIN    28 capsule    Take 1 capsule (500 mg) by mouth 2 times daily    Dysuria       traZODone 50 MG tablet    DESYREL     Take 150 mg by mouth At Bedtime        vitamin D 2000 units tablet     100 tablet    Take 2,000 Units by mouth daily.        zinc 50 MG Tabs      Take 1 tablet  by mouth daily        * Notice:  This list has 8 medication(s) that are the same as other medications prescribed for you. Read the directions carefully, and ask your doctor or other care provider to review them with you.

## 2018-07-05 NOTE — PATIENT INSTRUCTIONS
Supplements to prevent UTI  -probiotics  -cranberry   Ellura: [email or web address].[email or web address]   Theracran HP by Theralogix  -d-mannose    Websites with free information:    American Urogynecologic Society patient website: www.voicesforpfd.org    Total Control Program: www.totalcontrolprogram.com    Options we discussed:  -Anticholinergic medications: cannot be given with glaucoma unless approval from you eye doctor  -B3 agonist: cannot be given if you have high blood pressure issues  -Botulinum toxin in the bladder could be considered but may want to do urodynamics testing prior    Good luck in [place] and let us know if we can be of any help    It was a pleasure meeting with you today.  Thank you for allowing me and my team the privilege of caring for you today.  YOU are the reason we are here, and I truly hope we provided you with the excellent service you deserve.  Please let us know if there is anything else we can do for you so that we can be sure you are leaving completely satisfied with your care experience.

## 2018-07-05 NOTE — PROGRESS NOTES
[facility] Care Coordination Contact  Covering CM noted that member was seen in the ED yesterday for pain and vomiting but was sent home after stabilized.     Covering CM contacted member and she reports she is just tired and still having some pain. CM suggested that she contact her home care nurse and request a PRN visit to f/u as she was noting some issue with her pain pump. She reports she will call her home care nurse today. Informed her to contact this CM this week if she needs anything else otherwise her regular CM will be back next week.    Amber Hanna RN  [facility]  [phone]

## 2018-07-05 NOTE — NURSING NOTE
"Chief Complaint   Patient presents with     RECHECK     Bladder spasm follow up       Blood pressure 106/66, pulse 82, height 1.092 m (3' 7\"), weight 62.6 kg (138 lb), not currently breastfeeding. Body mass index is 52.47 kg/(m^2).    Patient Active Problem List   Diagnosis     Hyperlipidemia LDL goal <100     Seizure disorder (H)     ACP (advance care planning)     Osteoporosis     Schizoaffective disorder (H)     AS (sickle cell trait) (H)     Vertigo     Person who has had sex change operation     Claudication in peripheral vascular disease (H)     Intestinal malabsorption     GIB (gastrointestinal bleeding)     Cervicalgia     Health Care Home     Asthma     Adjustment disorder with depressed mood     Chronic pain syndrome     Open-angle glaucoma     Hx of colonic polyp     Old myocardial infarction     Iron deficiency anemia     Late effect of stroke     Degeneration of intervertebral disc of lumbosacral region     Thoracic or lumbosacral neuritis or radiculitis     Cerebral infarction due to occlusion or stenosis of carotid artery     Disorder of bone and cartilage     Hereditary and idiopathic peripheral neuropathy     Androgen insensitivity syndrome     PAD (peripheral artery disease) (H)     Chronic systolic congestive heart failure (H)     Stenosis of carotid artery     Osteoarthritis     Pain in both upper extremities     Atherosclerotic peripheral vascular disease with rest pain (H)     Essential hypertension     Cellulitis of right ankle     Angina pectoris, crescendo (H)     Type 2 diabetes mellitus with diabetic peripheral angiopathy without gangrene, without long-term current use of insulin (H)     Anemia, unspecified type     Critical lower limb ischemia     Testicular feminization     Anxiety disorder due to general medical condition     Central retinal artery occlusion     Lumbosacral radiculitis     Peripheral neuropathy     Osteopenia     Status post below knee amputation of right lower " extremity (H)     Primary open angle glaucoma of both eyes, severe stage     Pseudophakia of right eye     Cataract, left eye     Diabetes mellitus type 2 without retinopathy (H)     Pyelonephritis     Chronic obstructive pulmonary disease, unspecified COPD type (H)     JOSE (obstructive sleep apnea)     Complex sleep apnea syndrome     Coronary artery disease of native artery of native heart with stable angina pectoris (H)     Hyperlipidemia     Ischemic cardiomyopathy     Bee sting reaction, undetermined intent, subsequent encounter     Functional incontinence     Personal history of urinary tract infection     Dysphagia     Single seizure (H)     Essential hypertension with goal blood pressure less than 130/80     Hyperlipidemia LDL goal <70     UTI (urinary tract infection)     Food impaction of esophagus     Esophageal foreign body     Nausea & vomiting     Incontinence     Black tarry stools     Other migraine without status migrainosus, not intractable     Morbid obesity (H)     CVA, old, hemiparesis (H)     Tobacco abuse       Allergies   Allergen Reactions     Bee Venom      Penicillins Anaphylaxis     Other reaction(s): Skin Rash and/or Hives     Dilantin [Phenytoin] Other (See Comments)     Generic dilantin only per pt     Iodide Hives     09/11/15: Pt states she can use iodine and doesn't have any problems      Iodine-131      Novocaine [Procaine] Hives     Other reaction(s): Skin Rash and/or Hives     Tositumomab        Current Outpatient Prescriptions   Medication Sig Dispense Refill     ACETAMINOPHEN PO Take 1,000 mg by mouth every 4 hours as needed for pain Not to exceed 4000 mg/day       ADVAIR DISKUS 250-50 MCG/DOSE diskus inhaler Inhale 1 puff into the lungs 2 times daily 60 Inhaler 11     albuterol (2.5 MG/3ML) 0.083% neb solution INHALE 1 VIAL VIA NEBULIZER EVERY 6 HOURS AS NEEDED 360 mL 11     alendronate (FOSAMAX) 70 MG tablet Take 1 tablet (70 mg) by mouth with 8oz water every 7 days 30  minutes before breakfast and remain upright during this time. 12 tablet 3     aspirin EC 81 MG EC tablet Take 1 tablet (81 mg) by mouth daily 90 tablet 3     atorvastatin (LIPITOR) 40 MG tablet Take 1 tablet (40 mg) by mouth daily 90 tablet 2     blood glucose monitoring (ONE TOUCH ULTRA 2) meter device kit Use to test blood sugars 3 times daily or as directed. 1 kit 0     blood glucose monitoring (ONE TOUCH ULTRA) test strip Use to test blood sugars 3 times daily or as directed. 3 Box 3     blood glucose monitoring (ONE TOUCH ULTRASOFT) lancets Use to test blood sugar 3 times daily or as directed. 100 each PRN     brimonidine (ALPHAGAN) 0.2 % ophthalmic solution Place 1 drop into the right eye 2 times daily 1 Bottle 11     calcium citrate-vitamin D (CITRACAL) 315-250 MG-UNIT TABS Take 2 tablets by mouth daily 120 tablet 5     cetirizine (ZYRTEC) 10 MG tablet Take 1 tablet (10 mg) by mouth daily as needed for allergies 90 tablet 3     Cholecalciferol (VITAMIN D) 2000 UNITS tablet Take 2,000 Units by mouth daily. 100 tablet 3     CYANOCOBALAMIN PO Take 2,000 mcg by mouth daily       diclofenac (VOLTAREN) 1 % GEL topical gel Apply 2 g topically 4 times daily to hands 100 g 11     dorzolamide (TRUSOPT) 2 % ophthalmic solution Place 1 drop into the right eye 2 times daily 1 Bottle 11     EPINEPHrine (EPIPEN/ADRENACLICK/OR ANY BX GENERIC EQUIV) 0.3 MG/0.3ML injection 2-pack Inject 0.3 mLs (0.3 mg) into the muscle as needed for anaphylaxis 0.6 mL 1     escitalopram (LEXAPRO) 10 MG tablet Take 10 mg by mouth daily        ferrous sulfate (IRON) 325 (65 FE) MG tablet Take 1 tablet (325 mg) by mouth 2 times daily With meals 60 tablet 2     fluticasone (FLONASE) 50 MCG/ACT nasal spray Spray 1 spray into both nostrils daily       hydrochlorothiazide (MICROZIDE) 12.5 MG capsule Take 1 capsule (12.5 mg) by mouth daily 90 capsule 2     lactulose (CHRONULAC) 10 GM/15ML solution Take 20 g by mouth as needed for constipation        latanoprost (XALATAN) 0.005 % ophthalmic solution Place 1 drop into both eyes At Bedtime 2.5 mL 11     levETIRAcetam (KEPPRA) 500 MG tablet Take 1 tablet (500 mg) by mouth 2 times daily 180 tablet 1     lidocaine (LIDODERM) 5 % Patch APPLY 1 TO 3 PATCHES TO PAINFUL AREA AT ONCE FOR UP TO 12 HOURS WITHIN A 24 HOUR PERIOD REMOVE AFTER 12 HOURS 30 patch 5     lisinopril (PRINIVIL/ZESTRIL) 20 MG tablet Take 1 tablet (20 mg) by mouth daily 90 tablet 0     MEDICATION GIVEN BY INTRATHECAL PUMP - INSTRUCTION continuous 2/8/18: Pump managed by [facility] in [place] [phone].   Conc: Bupivacaine 20 mg/mL and Fentanyl 2000 mcg/mL, morphine 2 mg/mL  Continuous:  Fentanyl 796 mcg/day, Bupivacaine 7.96 mg/day, Morphine 0.796 mg/day   Boluses: Up to 7 boluses per 24-hr period of Bupivicaine 0.5994 mg and Fentanyl 59.9 mcg morphine 0.0599 mg.  Pump Refill Date 02/28/18       metFORMIN (GLUCOPHAGE-XR) 500 MG 24 hr tablet Take 1 tablet (500 mg) by mouth daily (with dinner) 90 tablet 3     metoprolol succinate (TOPROL-XL) 50 MG 24 hr tablet Take 1 tablet (50 mg) by mouth daily 90 tablet 0     Multiple Vitamin (MULTIVITAMIN OR) Take 1 tablet by mouth daily        nicotine (NICODERM CQ) 14 MG/24HR 24 hr patch Place 1 patch onto the skin daily 30 patch 0     nitroglycerin (NITROSTAT) 0.4 MG SL tablet Place 1 tablet (0.4 mg) under the tongue every 5 minutes as needed for chest pain if you are still having symptoms after 3 doses (15 minutes) call 911. 25 tablet 1     ondansetron (ZOFRAN-ODT) 8 MG ODT tab Take 1 tablet (8 mg) by mouth every 8 hours as needed 30 tablet 5     order for DME Equipment being ordered: bandage tape, prefer paper 1 Package 0     order for DME Full electric hospital bed with half rails    Dx: R83099, I110, J449  Length of need: lifetime 1 Device 0     order for DME Wheel Chair Cushion: 18 x 18 inch Roho cushion 1 Device 0     order for DME Hospital bed with side rails 1 Device 0      order for DME Equipment being ordered: CPAP supplies mask, hose, filters, cushion    fax to [facility] at [phone] 10 Device prn     order for DME Equipment being ordered: CPAP supplies mask, hose, filters, cushion fax to [facility] at [phone]  Disp: 10 Device Refills: prn   Class: Local Print Start: 2/10/2017 1 Device 0     order for DME Equipment being ordered: Nebulizer and tubing supplies 1 Units 0     order for DME Equipment being ordered: Glucerna daily shakes with each meal 1 Box 11     ORDER FOR DME Equipment being ordered: Power Wheelchair 1 Device 0     ORDER FOR DME Equipment being ordered: Depends briefs 1 Month 11     oxyCODONE IR (ROXICODONE) 5 MG tablet Take 1 tablet (5 mg) by mouth every 6 hours as needed for pain 5 tablet 0     pantoprazole (PROTONIX) 40 MG EC tablet Take 1 tablet (40 mg) by mouth daily 30 tablet 5     phenytoin 200 MG CAPS Take 200 mg by mouth 2 times daily (Patient taking differently: Take 200 mg by mouth 2 times daily (takes at 8 AM and 8 PM)) 60 capsule 0     polyethylene glycol (MIRALAX/GLYCOLAX) Packet Take 17 g by mouth daily Dissolved in water or juice        pregabalin (LYRICA) 75 MG capsule Take 2 capsules (150 mg) by mouth 2 times daily 120 capsule 5     risperiDONE (RISPERDAL) 0.5 MG tablet Take 0.5 mg by mouth At Bedtime       sucralfate (CARAFATE) 1 GM tablet Take 1 tablet (1 g) by mouth 4 times daily May dissolve in 10 mL water is necessary. (Start upon completion of carafate suspension) 120 tablet 11     tetracycline (ACHROMYCIN/SUMYCIN) 500 MG capsule Take 1 capsule (500 mg) by mouth 2 times daily (Patient not taking: Reported on 6/26/2018) 28 capsule 0     traZODone (DESYREL) 50 MG tablet Take 150 mg by mouth At Bedtime        umeclidinium (INCRUSE ELLIPTA) 62.5 MCG/INH oral inhaler Inhale 1 puff into the lungs daily       VENTOLIN  (90 BASE) MCG/ACT Inhaler Inhale 2 puffs into the lungs every 6 hours as needed for shortness of breath /  dyspnea or wheezing 3 Inhaler 5     zinc 50 MG TABS Take 1 tablet by mouth daily         Social History   Substance Use Topics     Smoking status: Current Every Day Smoker     Packs/day: 0.25     Years: 30.00     Types: Cigarettes, Cigars     Last attempt to quit: 11/1/2001     Smokeless tobacco: Never Used     Alcohol use AUBREY Garcia  7/5/2018  10:24 AM

## 2018-07-05 NOTE — LETTER
"7/5/2018       RE: Sonya Foote  [address]  [place] [address]     Dear Colleague,    Thank you for referring your patient, Sonya Foote, to the [facility] at [facility]. Please see a copy of my visit note below.    July 5, 2018    Return visit    Patient returns today for follow up.  Happy with the SPT but having bladder spasms that are bothersome.  She continues to have UTI.  She is here to inquire about options.  She does have a history of open angle glaucoma and significant peripheral vascular disease.  She is getting ready to move in with her sister who is a retired nurse.  She denies any changes in her health since last visit.    /66  Pulse 82  Ht 1.092 m (3' 7\")  Wt 62.6 kg (138 lb)  BMI 52.47 kg/m2  She is comfortable, in no distress, non-labored breathing.  Abdomen is soft, non-tender, non-distended.      A/P: 68 year old F with functional incontinence with SPT in placed with bladder spasms    We discussed supplements for UTI prevention    We discussed that given her glaucoma and the severe vascular disease she is not a good candidate for oral medications for bladder spasms.  We discussed that we could consider intravesical botulinum toxin injection but that we would likely want to do some urodynamics first    She will have this readdressed when she re-establishes care in [place]    At this time she will return to see us as needed    15 minutes were spent with the patient today, > 50% in counseling and coordination of care    Elizabeth Oliver MD MPH   of Urology    CC  Patient Care Team:  Allan Casey MD as PCP - General (Internal Medicine)  Lexii Cardenas, RN as Registered Nurse (Diabetes Education)  Shavon Elam RD as Registered Dietitian (Nutrition)  Jamie Sharma, RN as Lead Care Coordinator  Ernestine Weldon as Other (see comments)  Michelle Irizarry MD " as MD (Internal Medicine)  Savannah Durant MD as MD (Vascular Surgery)  Allan Casey MD as MD (Internal Medicine)  Dayna Ellis, NP as Nurse Practitioner (Nurse Practitioner)  Janusz Rm MD as MD (Ophthalmology)  Alina Jennings MD as MD (Pulmonary)  Mello Arroyo MD as MD (Family Medicine - Sports Medicine)  Mary Elliott OT as Occupational Therapist (Occupational Therapy)  Tonie Syed, RN as Nurse Coordinator (Vascular Surgery)  Salome Neri Maxine, RN as Registered Nurse (Urology)  Keanu Dawson MD as MD (Urology)  Ginger Cornelius, RN as Registered Nurse (Urology)  Marely Robin MD as MD (Ophthalmology)

## 2018-07-10 ENCOUNTER — PATIENT OUTREACH (OUTPATIENT)
Dept: GERIATRIC MEDICINE | Facility: CLINIC | Age: 68
End: 2018-07-10

## 2018-07-10 NOTE — PROGRESS NOTES
7/10/18: CM f/u with member regarding ED visit and pain pump.  Member states she is very tired and while pain pump is not working she is taking oral Morphine every 4 hours prn.  She is waiting to hear back from [facility] pain clinic when surgery will be to change out pain pump.  She states she has had pain pump for 4 years - supposed to last 7 years.  She wants to get surgery done this month before she move to [place].  Member states she has no needs right now but will inform CM when surgery is scheduled.     Jamie Sharma RN, N  [employer] [facility]

## 2018-07-12 ENCOUNTER — OFFICE VISIT (OUTPATIENT)
Dept: INTERNAL MEDICINE | Facility: CLINIC | Age: 68
End: 2018-07-12
Payer: COMMERCIAL

## 2018-07-12 ENCOUNTER — PATIENT OUTREACH (OUTPATIENT)
Dept: GERIATRIC MEDICINE | Facility: CLINIC | Age: 68
End: 2018-07-12

## 2018-07-12 DIAGNOSIS — R30.0 DYSURIA: Primary | ICD-10-CM

## 2018-07-12 DIAGNOSIS — G40.909 SEIZURE DISORDER (H): ICD-10-CM

## 2018-07-12 LAB
ALBUMIN UR-MCNC: NEGATIVE MG/DL
APPEARANCE UR: CLEAR
BILIRUB UR QL STRIP: NEGATIVE
COLOR UR AUTO: YELLOW
GLUCOSE UR STRIP-MCNC: NEGATIVE MG/DL
HGB UR QL STRIP: ABNORMAL
KETONES UR STRIP-MCNC: NEGATIVE MG/DL
LEUKOCYTE ESTERASE UR QL STRIP: NEGATIVE
NITRATE UR QL: NEGATIVE
PH UR STRIP: 7.5 PH (ref 5–7)
PHENYTOIN SERPL-MCNC: 16.9 MG/L (ref 10–20)
RBC #/AREA URNS AUTO: ABNORMAL /HPF
SOURCE: ABNORMAL
SP GR UR STRIP: 1.01 (ref 1–1.03)
UROBILINOGEN UR STRIP-ACNC: 0.2 EU/DL (ref 0.2–1)
WBC #/AREA URNS AUTO: ABNORMAL /HPF

## 2018-07-12 PROCEDURE — 36415 COLL VENOUS BLD VENIPUNCTURE: CPT | Performed by: PSYCHIATRY & NEUROLOGY

## 2018-07-12 PROCEDURE — 99213 OFFICE O/P EST LOW 20 MIN: CPT | Performed by: PHYSICIAN ASSISTANT

## 2018-07-12 PROCEDURE — 80185 ASSAY OF PHENYTOIN TOTAL: CPT | Performed by: PSYCHIATRY & NEUROLOGY

## 2018-07-12 PROCEDURE — 81001 URINALYSIS AUTO W/SCOPE: CPT | Performed by: PHYSICIAN ASSISTANT

## 2018-07-12 PROCEDURE — 87086 URINE CULTURE/COLONY COUNT: CPT | Performed by: PHYSICIAN ASSISTANT

## 2018-07-12 RX ORDER — SULFAMETHOXAZOLE/TRIMETHOPRIM 800-160 MG
1 TABLET ORAL 2 TIMES DAILY
Qty: 14 TABLET | Refills: 0 | Status: SHIPPED | OUTPATIENT
Start: 2018-07-12 | End: 2018-07-27

## 2018-07-12 NOTE — PROGRESS NOTES
Member called CM stating that [facility] has not f/u with member re: surgery to replace pain pump.  She is getting anxious as she wants done this month before moving to [place].   CM and member placed call to [facility] [phone] - spoke with  who states authorization just came in yesterday but member needs to speak with Implant Dept - tx call to [facility] in implant dept - left vm for [facility].     CM received call back from [facility] stating that they have not received auth from [facility] yet at this time - she expects to come soon and will let CM/member know.  She is aware of member's plan to move to [place] and states that time for surgery next week or following week.       CM relayed to member.     Member states that she is going to clinic - believes she has a UTI again.  She is going to discuss daily atbx.  She states Urology declined and stated could discuss with  in [place].  Member will discuss with  At this time.     Jamie Sharma RN, PHN  [employer]

## 2018-07-12 NOTE — MR AVS SNAPSHOT
After Visit Summary   7/12/2018    Sonya Foote    MRN: [id number]           Patient Information     Date Of Birth          1950        Visit Information        Provider Department      7/12/2018 11:40 AM Machelle Solorzano PA-C [facility]        Today's Diagnoses     Dysuria    -  1       Follow-ups after your visit        Your next 10 appointments already scheduled     Jul 26, 2018 10:15 AM CDT   RETURN GLAUCOMA with Marely Robin MD   [facility] Clinic ([facility])    [address]  9[address]  [facility] [id number] [phone]            Aug 13, 2018  9:00 AM CDT   (Arrive by 8:45 AM)   Return Visit with Alina Jennings MD   [facility] ([facility])    [address]  [address]  [facility] [id number] [phone]              Who to contact     If you have questions or need follow up information about today's clinic visit or your schedule please contact [facility] directly at [phone].  Normal or non-critical lab and imaging results will be communicated to you by MyChart, letter or phone within 4 business days after the clinic has received the results. If you do not hear from us within 7 days, please contact the clinic through MyChart or phone. If you have a critical or abnormal lab result, we will notify you by phone as soon as possible.  Submit refill requests through [facility] or call your pharmacy and they will forward the refill request to us. Please allow 3 business days for your refill to be completed.          Additional Information About Your Visit        Care EveryWhere ID     This is your Care EveryWhere ID. This could be used by other organizations to access your [place] medical records  [id number]        Your Vitals Were     Breastfeeding?                   No            Blood Pressure from Last 3  Encounters:   07/12/18 (P) 116/74   07/05/18 106/66   07/04/18 105/60    Weight from Last 3 Encounters:   07/12/18 (P) 138 lb (62.6 kg)   07/05/18 138 lb (62.6 kg)   07/04/18 138 lb (62.6 kg)              We Performed the Following     UA with Microscopic reflex to Culture          Today's Medication Changes          These changes are accurate as of 7/12/18 12:42 PM.  If you have any questions, ask your nurse or doctor.               Start taking these medicines.        Dose/Directions    sulfamethoxazole-trimethoprim 800-160 MG per tablet   Commonly known as:  BACTRIM DS/SEPTRA DS   Used for:  Dysuria   Started by:  Machelle Solorzano PA-C        Dose:  1 tablet   Take 1 tablet by mouth 2 times daily   Quantity:  14 tablet   Refills:  0         These medicines have changed or have updated prescriptions.        Dose/Directions    Phenytoin Sodium Extended 200 MG Caps   This may have changed:  additional instructions   Used for:  Seizure disorder (H)        Dose:  200 mg   Take 200 mg by mouth 2 times daily   Quantity:  60 capsule   Refills:  0            Where to get your medicines      These medications were sent to [id number] - [place], [place] - [address]  [address], [facility] [address]     Phone:  [phone]     sulfamethoxazole-trimethoprim 800-160 MG per tablet                Primary Care Provider Office Phone # Fax #    Allan Casey [phone]       [address] [place] [id number]        Equal Access to Services     IRAJ CARREON AH: Hadii shaheen ku hadasho Soomaali, waaxda luqadaha, qaybta kaalmada adeegyada, tonie marroquin. So [facility] [phone].    ATENCIÓN: Si habla español, tiene a briones disposición servicios gratuitos de asistencia lingüística. Llame al [phone].    We comply with applicable federal civil rights laws and [place] laws. We do not discriminate on the basis of race, color, national origin,  age, disability, sex, sexual orientation, or gender identity.            Thank you!     Thank you for choosing [facility]  for your care. Our goal is always to provide you with excellent care. Hearing back from our patients is one way we can continue to improve our services. Please take a few minutes to complete the written survey that you may receive in the mail after your visit with us. Thank you!             Your Updated Medication List - Protect others around you: Learn how to safely use, store and throw away your medicines at www.disposemymeds.org.          This list is accurate as of 7/12/18 12:42 PM.  Always use your most recent med list.                   Brand Name Dispense Instructions for use Diagnosis    ACETAMINOPHEN PO      Take 1,000 mg by mouth every 4 hours as needed for pain Not to exceed 4000 mg/day        ADVAIR DISKUS 250-50 MCG/DOSE diskus inhaler   Generic drug:  fluticasone-salmeterol     60 Inhaler    Inhale 1 puff into the lungs 2 times daily    Acute bronchitis       * albuterol (2.5 MG/3ML) 0.083% neb solution     360 mL    INHALE 1 VIAL VIA NEBULIZER EVERY 6 HOURS AS NEEDED    Chronic obstructive pulmonary disease, unspecified COPD type (H)       * VENTOLIN  (90 Base) MCG/ACT Inhaler   Generic drug:  albuterol     3 Inhaler    Inhale 2 puffs into the lungs every 6 hours as needed for shortness of breath / dyspnea or wheezing    Chronic obstructive pulmonary disease, unspecified COPD type (H)       alendronate 70 MG tablet    FOSAMAX    12 tablet    Take 1 tablet (70 mg) by mouth with 8oz water every 7 days 30 minutes before breakfast and remain upright during this time.    Age-related osteoporosis without current pathological fracture       aspirin 81 MG EC tablet     90 tablet    Take 1 tablet (81 mg) by mouth daily    Unstable angina (H)       atorvastatin 40 MG tablet    LIPITOR    90 tablet    Take 1 tablet (40 mg) by mouth daily    Hyperlipidemia LDL  goal <100       blood glucose monitoring lancets     100 each    Use to test blood sugar 3 times daily or as directed.    Type 2 diabetes mellitus with diabetic peripheral angiopathy without gangrene, without long-term current use of insulin (H)       blood glucose monitoring meter device kit     1 kit    Use to test blood sugars 3 times daily or as directed.    Type 2 diabetes, HbA1C goal < 8% (H)       blood glucose monitoring test strip    ONETOUCH ULTRA    3 Box    Use to test blood sugars 3 times daily or as directed.    Type 2 diabetes mellitus with diabetic peripheral angiopathy without gangrene, without long-term current use of insulin (H)       brimonidine 0.2 % ophthalmic solution    ALPHAGAN    1 Bottle    Place 1 drop into the right eye 2 times daily    Primary open angle glaucoma of both eyes, severe stage       calcium citrate-vitamin D 315-250 MG-UNIT Tabs per tablet    CITRACAL    120 tablet    Take 2 tablets by mouth daily    Osteoporosis       cetirizine 10 MG tablet    zyrTEC    90 tablet    Take 1 tablet (10 mg) by mouth daily as needed for allergies    Seasonal allergic rhinitis, unspecified allergic rhinitis trigger       CYANOCOBALAMIN PO      Take 2,000 mcg by mouth daily        diclofenac 1 % Gel topical gel    VOLTAREN    100 g    Apply 2 g topically 4 times daily to hands    Primary osteoarthritis of both hands       dorzolamide 2 % ophthalmic solution    TRUSOPT    1 Bottle    Place 1 drop into the right eye 2 times daily    Primary open angle glaucoma of both eyes, severe stage       EPINEPHrine 0.3 MG/0.3ML injection 2-pack    EPIPEN/ADRENACLICK/or ANY BX GENERIC EQUIV    0.6 mL    Inject 0.3 mLs (0.3 mg) into the muscle as needed for anaphylaxis    Bee sting reaction, undetermined intent, subsequent encounter       escitalopram 10 MG tablet    LEXAPRO     Take 10 mg by mouth daily        ferrous sulfate 325 (65 Fe) MG tablet    IRON    60 tablet    Take 1 tablet (325 mg) by mouth 2  times daily With meals        fluticasone 50 MCG/ACT spray    FLONASE     Spray 1 spray into both nostrils daily        hydrochlorothiazide 12.5 MG capsule    MICROZIDE    90 capsule    Take 1 capsule (12.5 mg) by mouth daily    Essential hypertension with goal blood pressure less than 130/80       INCRUSE ELLIPTA 62.5 MCG/INH oral inhaler   Generic drug:  umeclidinium      Inhale 1 puff into the lungs daily        lactulose 10 GM/15ML solution    CHRONULAC     Take 20 g by mouth as needed for constipation        latanoprost 0.005 % ophthalmic solution    XALATAN    2.5 mL    Place 1 drop into both eyes At Bedtime    Primary open angle glaucoma of both eyes, severe stage       levETIRAcetam 500 MG tablet    KEPPRA    180 tablet    Take 1 tablet (500 mg) by mouth 2 times daily    Nausea       lidocaine 5 % Patch    LIDODERM    30 patch    APPLY 1 TO 3 PATCHES TO PAINFUL AREA AT ONCE FOR UP TO 12 HOURS WITHIN A 24 HOUR PERIOD REMOVE AFTER 12 HOURS    Chronic pain syndrome, 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 (H)       lisinopril 20 MG tablet    PRINIVIL/ZESTRIL    90 tablet    Take 1 tablet (20 mg) by mouth daily    History of coronary artery disease       MEDICATION GIVEN BY INTRATHECAL PUMP - INSTRUCTION      continuous 2/8/18: Pump managed by [facility] in [place] [phone].  Conc: Bupivacaine 20 mg/mL and Fentanyl 2000 mcg/mL, morphine 2 mg/mL Continuous:  Fentanyl 796 mcg/day, Bupivacaine 7.96 mg/day, Morphine 0.796 mg/day  Boluses: Up to 7 boluses per 24-hr period of Bupivicaine 0.5994 mg and Fentanyl 59.9 mcg morphine 0.0599 mg. Pump Refill Date 02/28/18        metFORMIN 500 MG 24 hr tablet    GLUCOPHAGE-XR    90 tablet    Take 1 tablet (500 mg) by mouth daily (with dinner)    Type 2 diabetes mellitus with diabetic peripheral angiopathy and gangrene, without long-term current use of insulin (H)       metoprolol succinate 50 MG 24 hr tablet    TOPROL-XL     90 tablet    Take 1 tablet (50 mg) by mouth daily    History of coronary artery disease       MULTIVITAMIN PO      Take 1 tablet by mouth daily        nicotine 14 MG/24HR 24 hr patch    NICODERM CQ    30 patch    Place 1 patch onto the skin daily    Tobacco dependence syndrome       nitroGLYcerin 0.4 MG sublingual tablet    NITROSTAT    25 tablet    Place 1 tablet (0.4 mg) under the tongue every 5 minutes as needed for chest pain if you are still having symptoms after 3 doses (15 minutes) call 911.    Chronic systolic congestive heart failure (H), Old myocardial infarction       ondansetron 8 MG ODT tab    ZOFRAN-ODT    30 tablet    Take 1 tablet (8 mg) by mouth every 8 hours as needed    Other migraine without status migrainosus, not intractable       order for DME     1 Month    Equipment being ordered: Depends briefs    Incontinence       order for DME     1 Device    Equipment being ordered: Power Wheelchair    CVA (cerebral infarction), HTN (hypertension)       order for DME     1 Box    Equipment being ordered: Glucerna daily shakes with each meal    Type 2 diabetes mellitus with other diabetic neurological complication       * order for DME     1 Units    Equipment being ordered: Nebulizer and tubing supplies    Simple chronic bronchitis (H)       * order for DME     1 Device    Equipment being ordered: CPAP supplies mask, hose, filters, cushion fax to [facility] at [phone] Disp: 10 DeviceRefills: prn Class: Local PrintStart: 2/10/2017    Chronic obstructive pulmonary disease, unspecified COPD type (H)       * order for DME     10 Device    Equipment being ordered: CPAP supplies mask, hose, filters, cushion  fax to [facility] at [phone]    COPD (chronic obstructive pulmonary disease) (H)       * order for DME     1 Device    Hospital bed with side rails    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 (H)       * order for DME     1 Device    Full electric hospital bed with half  [facility]  Dx: M68673, I110, J449 Length of need: lifetime    Status post bilateral above knee amputation (H)       * order for DME     1 Device    Wheel Chair Cushion: 18 x 18 inch Roho cushion    Status post bilateral above knee amputation (H)       order for DME     1 Package    Equipment being ordered: bandage tape, prefer paper    Burn of skin       oxyCODONE IR 5 MG tablet    ROXICODONE    5 tablet    Take 1 tablet (5 mg) by mouth every 6 hours as needed for pain    Bladder spasm       pantoprazole 40 MG EC tablet    PROTONIX    30 tablet    Take 1 tablet (40 mg) by mouth daily    Gastroesophageal reflux disease without esophagitis       Phenytoin Sodium Extended 200 MG Caps     60 capsule    Take 200 mg by mouth 2 times daily    Seizure disorder (H)       polyethylene glycol Packet    MIRALAX/GLYCOLAX     Take 17 g by mouth daily Dissolved in water or juice        pregabalin 75 MG capsule    LYRICA    120 capsule    Take 2 capsules (150 mg) by mouth 2 times daily    Pain in both upper extremities       risperiDONE 0.5 MG tablet    risperDAL     Take 0.5 mg by mouth At Bedtime        sucralfate 1 GM tablet    CARAFATE    120 tablet    Take 1 tablet (1 g) by mouth 4 times daily May dissolve in 10 mL water is necessary. (Start upon completion of carafate suspension)    Adjustment disorder with depressed mood       sulfamethoxazole-trimethoprim 800-160 MG per tablet    BACTRIM DS/SEPTRA DS    14 tablet    Take 1 tablet by mouth 2 times daily    Dysuria       traZODone 50 MG tablet    DESYREL     Take 150 mg by mouth At Bedtime        vitamin D 2000 units tablet     100 tablet    Take 2,000 Units by mouth daily.        zinc 50 MG Tabs      Take 1 tablet by mouth daily        * Notice:  This list has 8 medication(s) that are the same as other medications prescribed for you. Read the directions carefully, and ask your doctor or other care provider to review them with you.

## 2018-07-12 NOTE — PROGRESS NOTES
SUBJECTIVE:   Sonya Foote is a 68 year old female who presents to clinic today for the following health issues:      ED/UC Followup:    Facility:  [facility] Emergency Department  Date of visit: 7/4/18  Reason for visit: Nausea & vomiting  ED SUMMARY:  68 year old female recently had explanation of her pain pump for chronic back pain. She now has nausea and vomiting. Clinical appearance was consistent with opiate withdrawal. She was given fluids, anti-emetics, and what I believe is her new home dose of morphine as well as a few doses of IV morphine. Her symptoms have abated. It is my understanding that her care facility should have her pain medication available. She feels better at 10:49 AM and will be discharged home. I will not provide additional opiates as she is to have a prescription available to her.    I have reviewed the nursing notes.   I have reviewed the findings, diagnosis, plan and need for follow up with the patient.    Current Status: Pt states that she is still having the Nausea and vomiting, Pt is having dysuria, burning, smelling and states it does not smell good at all.  Fatigued.    Onset: 1 week   Pt notes no appetite.   Pt has been throwing up the last 3-4 days, vomiting 2x/day.   Pt has felt hot and clammy temp was 99.   Pt notes no energy.  Pt reports her blood sugars have been well controlled.   Pt reports she has been taking her pain medications and is awaiting a new pain pump.        Problem list and histories reviewed & adjusted, as indicated.  Additional history: as documented      Reviewed and updated as needed this visit by clinical staff  Tobacco  Allergies  Meds  Problems       Reviewed and updated as needed this visit by Provider  Meds  Problems         OBJECTIVE:     BP (P) 116/74  Pulse (P) 72  Temp (P) 98.2  F (36.8  C) (Oral)  Resp (P) 18  Wt (P) 138 lb (62.6 kg)  SpO2 (P) 96%  Breastfeeding? No  BMI (P) 52.47 kg/m2  Body mass index is 52.47 kg/(m^2)  (pended).  GENERAL: healthy, alert and no distress  RESP: lungs clear to auscultation - no rales, rhonchi or wheezes  CV: regular rates and rhythm, normal S1 S2, no S3 or S4 and no murmur, click or rub  ABDOMEN: soft, mild tenderness throughout, without hepatosplenomegaly or masses and bowel sounds normal    Diagnostic Test Results:  Results for orders placed or performed in visit on 07/12/18 (from the past 24 hour(s))   UA with Microscopic reflex to Culture   Result Value Ref Range    Color Urine Yellow     Appearance Urine Clear     Glucose Urine Negative NEG^Negative mg/dL    Bilirubin Urine Negative NEG^Negative    Ketones Urine Negative NEG^Negative mg/dL    Specific Gravity Urine 1.015 1.003 - 1.035    pH Urine 7.5 (H) 5.0 - 7.0 pH    Protein Albumin Urine Negative NEG^Negative mg/dL    Urobilinogen Urine 0.2 0.2 - 1.0 EU/dL    Nitrite Urine Negative NEG^Negative    Blood Urine Small (A) NEG^Negative    Leukocyte Esterase Urine Negative NEG^Negative    Source Catheterized Urine     WBC Urine 0 - 5 OTO5^0 - 5 /HPF    RBC Urine 2-5 (A) OTO2^O - 2 /HPF     *Note: Due to a large number of results and/or encounters for the requested time period, some results have not been displayed. A complete set of results can be found in Results Review.       ASSESSMENT/PLAN:       1. Dysuria  - reviewed case with pt's pcp   - work up for UTI as below   - initially prescribed bactrim DS, placed on hold after UA results until culture confirming infection   - UA with Microscopic reflex to Culture  - sulfamethoxazole-trimethoprim (BACTRIM DS/SEPTRA DS) 800-160 MG per tablet; Take 1 tablet by mouth 2 times daily  Dispense: 14 tablet; Refill: 0  - Urine Culture Aerobic Bacterial  - reviewed vomiting with pcp, pt to take zofran prn as this has been a chronic issue  - follow up if symptoms persist or worsen   - reviewed signs/symptoms that should prompt urgent follow up   - pt agreed to above plan and all questions are answered      Machelle Solorzano PA-C  [facility]

## 2018-07-13 ENCOUNTER — OFFICE VISIT (OUTPATIENT)
Dept: INTERNAL MEDICINE | Facility: CLINIC | Age: 68
End: 2018-07-13
Payer: COMMERCIAL

## 2018-07-13 VITALS
WEIGHT: 138 LBS | SYSTOLIC BLOOD PRESSURE: 112 MMHG | HEART RATE: 72 BPM | BODY MASS INDEX: 31.94 KG/M2 | HEIGHT: 55 IN | RESPIRATION RATE: 16 BRPM | DIASTOLIC BLOOD PRESSURE: 74 MMHG | TEMPERATURE: 98 F

## 2018-07-13 DIAGNOSIS — E11.51 TYPE 2 DIABETES MELLITUS WITH DIABETIC PERIPHERAL ANGIOPATHY WITHOUT GANGRENE, WITHOUT LONG-TERM CURRENT USE OF INSULIN (H): ICD-10-CM

## 2018-07-13 DIAGNOSIS — I50.22 CHRONIC SYSTOLIC CONGESTIVE HEART FAILURE (H): ICD-10-CM

## 2018-07-13 DIAGNOSIS — Z89.511 STATUS POST BELOW KNEE AMPUTATION OF RIGHT LOWER EXTREMITY (H): ICD-10-CM

## 2018-07-13 DIAGNOSIS — G89.4 CHRONIC PAIN SYNDROME: ICD-10-CM

## 2018-07-13 DIAGNOSIS — I25.118 CORONARY ARTERY DISEASE OF NATIVE ARTERY OF NATIVE HEART WITH STABLE ANGINA PECTORIS (H): ICD-10-CM

## 2018-07-13 DIAGNOSIS — I10 ESSENTIAL HYPERTENSION: ICD-10-CM

## 2018-07-13 DIAGNOSIS — F25.9 SCHIZOAFFECTIVE DISORDER, UNSPECIFIED TYPE (H): ICD-10-CM

## 2018-07-13 DIAGNOSIS — M51.379 DEGENERATION OF INTERVERTEBRAL DISC OF LUMBOSACRAL REGION: ICD-10-CM

## 2018-07-13 DIAGNOSIS — D57.3: ICD-10-CM

## 2018-07-13 DIAGNOSIS — J44.9 CHRONIC OBSTRUCTIVE PULMONARY DISEASE, UNSPECIFIED COPD TYPE (H): Chronic | ICD-10-CM

## 2018-07-13 DIAGNOSIS — Z01.818 PREOP GENERAL PHYSICAL EXAM: Primary | ICD-10-CM

## 2018-07-13 DIAGNOSIS — E78.5 HYPERLIPIDEMIA LDL GOAL <100: ICD-10-CM

## 2018-07-13 PROCEDURE — 99215 OFFICE O/P EST HI 40 MIN: CPT | Performed by: PHYSICIAN ASSISTANT

## 2018-07-13 ASSESSMENT — PAIN SCALES - GENERAL: PAINLEVEL: NO PAIN (0)

## 2018-07-13 NOTE — PATIENT INSTRUCTIONS
DO not take Hydrochlorothiazide or Metformin the morning of procedure  Resume after procedure    Take rest of morning medications with a sip of water day of surgery       Stop aspirin as of today- per pain clinic restart after surgery          Before Your Surgery      Call your surgeon if there is any change in your health. This includes signs of a cold or flu (such as a sore throat, runny nose, cough, rash or fever).    Do not smoke, drink alcohol or take over the counter medicine (unless your surgeon or primary care doctor tells you to) for the 24 hours before and after surgery.    If you take prescribed drugs: Follow your doctor s orders about which medicines to take and which to stop until after surgery.    Eating and drinking prior to surgery: follow the instructions from your surgeon    Take a shower or bath the night before surgery. Use the soap your surgeon gave you to gently clean your skin. If you do not have soap from your surgeon, use your regular soap. Do not shave or scrub the surgery site.  Wear clean pajamas and have clean sheets on your bed.

## 2018-07-13 NOTE — PROGRESS NOTES
[address] [address]  [phone]  Dept: [phone]    PRE-OP EVALUATION:  Today's date: 2018    Sonya Foote (: 1950) presents for pre-operative evaluation assessment as requested by Dr. Trejo .  She requires evaluation and anesthesia risk assessment prior to undergoing surgery/procedure for treatment of  Chronic pain syndrom, chronic low back pan- Pump Replacement .    Proposed Surgery/ Procedure: Pump Replacement   Date of Surgery/ Procedure:   Time of Surgery/ Procedure: 10 am   Hospital/Surgical Facility: [facility]   [phone] and [phone] Please fax to both number   Primary Physician: Allan Casey  Type of Anesthesia Anticipated: General    Patient has a Health Care Directive or Living Will:  YES     1. YES - DO YOU HAVE A HISTORY OF HEART ATTACK, STROKE, STENT, BYPASS OR SURGERY ON AN ARTERY IN THE HEAD, NECK, HEART OR LEG? Stroke and Stents   2. NO - Do you ever have any pain or discomfort in your chest?  3. YES - DO YOU HAVE A HISTORY OF HEART FAILURE - seen routinely by cardiology EKG and echo done in 2018  4. No - ARE YOUR TROUBLED BY SHORTNESS OF BREATH WHEN WALKING ON THE LEVEL, UP A SLIGHT HILL OR AT NIGHT?   5. NO - Do you currently have a cold, bronchitis or other respiratory infection?  6. YES - DO YOU HAVE A COUGH, SHORTNESS OF BREATH OR WHEEZING? COPD- using inhalers   7. NO - Do you sometimes get pains in the calves of your legs when you walk?  8. NO - Do you or anyone in your family have previous history of blood clots?  9. NO - Do you or does anyone in your family have a serious bleeding problem such as prolonged bleeding following surgeries or cuts?  10. YES - HAVE YOU EVER HAD PROBLEMS WITH ANEMIA OR BEEN TOLD TO TAKE IRON PILLS? Anemia   11. NO - Have you had any abnormal blood loss such as black, tarry or bloody stools, or abnormal vaginal bleeding?  12. YES - HAVE YOU EVER HAD A  BLOOD TRANSFUSION? 2016  13. NO - Have you or any of your relatives ever had problems with anesthesia?  14. YES - DO YOU HAVE SLEEP APNEA, EXCESSIVE SNORING OR DAYTIME DROWSINESS? Sleep Apnea   15. NO - Do you have any prosthetic heart valves?  16. NO - Do you have prosthetic joints?  17. NO - Is there any chance that you may be pregnant?      HPI:     HPI related to upcoming procedure: replacement of pain pump for chronic pain related to low back DJD       See problem list for active medical problems.  Problems all longstanding and stable, except as noted/documented.  See ROS for pertinent symptoms related to these conditions.                                                                                                                                                          .  CAD - Patient has a longstanding history of moderate-severe CAD. Patient denies recent chest pain or NTG use, denies exercise induced dyspnea or PND., EKG 5/2018 with echo .                                                                                                                                                    .  CHF - Patient has a longstanding history of moderate-severe CHF. Exacerbating conditions include hypertension and COPD. Currently the patient's condition is same. Current treatment regimen includes as per medication list . The patient denies chest pain, edema, orthopnea, SOB or recent weight gain. Last Echocardiogram 5/2018, EKG 5/2018.                                                                                                                                                                               .  COPD - Patient has a longstanding history of moderate-severe COPD . Patient has been doing well overall noting SOB and stable  and continues on medication regimen consisting of per list without adverse reactions or side effects.                                                                                                          .  DIABETES - Patient has a longstanding history of DiabetesType Type II . Patient is being treated with oral agents and denies significant side effects. Control has been good. Complicating factors include but are not limited to: hypertension, hyperlipidemia, retinopathy, neuropathy and CAD/PVD.                                                                                                                            .  HYPERTENSION - Patient has longstanding history of HTN , currently denies any symptoms referable to elevated blood pressure. Specifically denies chest pain, palpitations, dyspnea, orthopnea, PND or peripheral edema. Blood pressure readings have been in normal range. Current medication regimen is as listed below. Patient denies any side effects of medication.                                                                                                                                                                                          .  SLEEP PROBLEM - Patient has a longstanding history of snoring and sleep apnea ..                                                                                                                                        .    MEDICAL HISTORY:     Patient Active Problem List    Diagnosis Date Noted     Tobacco abuse 06/26/2018     Priority: Medium     CVA, old, hemiparesis (H) 06/25/2018     Priority: Medium     Morbid obesity (H) 05/09/2018     Priority: Medium     Other migraine without status migrainosus, not intractable 03/20/2018     Priority: Medium     Black tarry stools 02/08/2018     Priority: Medium     Incontinence 01/16/2018     Priority: Medium     Nausea & vomiting 12/06/2017     Priority: Medium     Esophageal foreign body 11/27/2017     Priority: Medium     Food impaction of esophagus 11/26/2017     Priority: Medium     UTI (urinary tract infection) 10/08/2017     Priority: Medium     Essential hypertension with goal blood pressure less  than 130/80 09/27/2017     Priority: Medium     Hyperlipidemia LDL goal <70 09/27/2017     Priority: Medium     Functional incontinence 07/19/2017     Priority: Medium     Personal history of urinary tract infection 07/19/2017     Priority: Medium     Bee sting reaction, undetermined intent, subsequent encounter 04/06/2017     Priority: Medium     Coronary artery disease of native artery of native heart with stable angina pectoris (H) 04/04/2017     Priority: Medium     Hyperlipidemia 04/04/2017     Priority: Medium     Ischemic cardiomyopathy 04/04/2017     Priority: Medium     Complex sleep apnea syndrome 03/15/2017     Priority: Medium     JOSE (obstructive sleep apnea) 02/22/2017     Priority: Medium     10/26/2012 - [facility] at [email or web address] -  min; AHI 14; RDI 22; ERIN 5/hr; No PLMs or RBD.  11/16/2012 - Titration Polysomnography at [facility] [[email or web address]] - Started on CPAP and developed treatment-emergent central apneas.  Adaptive Servo-Ventilation titration optimal but did well on CPAP 8 as well. Put on CPAP 8 cmH2O.    Has systolic CHF, intrathecal narcotic pump, and treatment-emergent Central Sleep Apnea on CPAP.  Titration Study:  Sleep Study 2/26/2017 - (130.0 lbs) CPAP was titrated to a pressure of 12 with an AHI of 38.5.  Time Spent in REM supine at this pressure was 7.0       Pyelonephritis 12/22/2016     Priority: Medium     Primary open angle glaucoma of both eyes, severe stage 12/08/2016     Priority: Medium     Pseudophakia of right eye 12/08/2016     Priority: Medium     Cataract, left eye 12/08/2016     Priority: Medium     Diabetes mellitus type 2 without retinopathy (H) 12/08/2016     Priority: Medium     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 (H) 11/23/2016     Priority: Medium     Critical lower limb ischemia 11/07/2016     Priority: Medium     Anemia, unspecified type 10/22/2016     Priority: Medium     Type 2 diabetes mellitus with diabetic peripheral angiopathy without gangrene, without  long-term current use of insulin (H) 10/12/2016     Priority: Medium     Angina pectoris, crescendo (H) 2016     Priority: Medium     Cellulitis of right ankle 2016     Priority: Medium     Essential hypertension 2016     Priority: Medium     Dysphagia 2016     Priority: Medium     Atherosclerotic peripheral vascular disease with rest pain (H) 2016     Priority: Medium     Osteoarthritis 2016     Priority: Medium     Pain in both upper extremities 2016     Priority: Medium     Stenosis of carotid artery 2016     Priority: Medium     Chronic systolic congestive heart failure (H) 2016     Priority: Medium     PAD (peripheral artery disease) (H) 2015     Priority: Medium     Health Care Home 2015     Priority: Medium     [facility]   Jamie Sharma RN  [phone]    [facility] CARE PLAN SUMMARY    Client Name:  Sonya Foote  Address:  [address] [place] [address]  [address] [address] Phone: [phone] (Cell)   :  1950 Date of Assessment:  17   Health Plan:  [facility]  Health Plan Number: [id number] Medical Assistance Number: [id number]  Financial Worker:  [facility]  Case #:  [id number]   [facility] :  Jamie Sharma RN  Phone:  [phone]   Fax:  [phone]   [facility] Enrollment Date: 4/1/15 Case Mix:  E  Rate Cell:  B  Waiver Type:  EW   Emergency Contact:  Extended Emergency Contact Information  Primary Emergency Contact: Kam Carranza  Mobile Phone: [phone]  Prashanth Carranza  Mobile Phone: [phone]  Relation: Daughter Language:  English  :  No   Health Care Agent/POA:  Jayde Carranza (spouse).   1st Alternate: Prashanth Carranza (daughter)   2nd Alternate: Anahi Carranza (son)  --- Needs to update Advanced Directives/Living Will:  Yes   Primary Care Clinic/Phone/Fax:  [facility]/(p) [phone], (f) [phone] Primary Dx:  E11.51  "Diabetes  Secondary Dx:   I73.9 Atherosclerotic peripheral vascular disease with rest pain   Primary Physician:  Allan Casey   Height:  5' 7\"  Weight:  133 lbs   Specialty Physician:     MAPs - [place]  Urology - [facility] Audiologist:  ROCÍO   Eye Care Provider:  [facility] Care Provider:    [facility]: [facility] [phone]   Other:  ARHMS Worker - Family Support Services - [facility] [place] #: [phone] ARHMS Worker - Prashanth Workman [phone] Special Transportation ([employer]) Provider: [employer] [phone]         [facility] CURRENT SERVICES SUMMARY  Equipment owned/DME history: power chair, manual w/c, standard walker, 4 wheeled walker w/ seat; cane  Transfer tub bench, safety pole w/ super bar; drop arm commode, transfer board, talking watch, reacher, hospital bed   SERVICE TYPE/PROVIDER NAME/PHONE AUTH DATE FREQUENCY Units OR $ Amt DESCRIPTION   Medical Transportation: [employer] [id number] [phone] 12/1/17 - 11/30/18 as needed N/A N/A     Supplies: [employer] [phone] 12/1/17 - 11/30/18   monthly N/A    N/A    N/A      $21.40 Pull ups (size medium) 120    Tabbed Briefs (size L) 200    Glucerna 2 cans/day       Wipes 5 pkg/mo - (EW) ($4.28/pkg)     [employer] tickets through [employer] [facility] (EW) 12/1/17 - 11/30/18 monthly EW 10 rush  10 non rush     HHA/RN: [facility] [phone]     PT/OT   12/1/17 - 11/30/18 RN - eval and treat       N/A RN visits       PT/OT visits     Assisted Living: Vanessa Delgado  [phone]   Fax: [phone]   24 hr Customized Living  (RN - [phone] - Julia Alvarez RN) 12/12/17 - 12/11/18 daily See RS/AL Tool      DME: [employer] [phone]  Fax: [phone] 12/18/17 1x order $15      $15      $28.60      $35.75 Weighted bendable spoon (EW - $15)    Weighted bendable fork (EW -$15)    Weighted cup with lid (EW - $28.60)    Cup arias for power chair (EW - $35.75)     DME: [employer] " [phone]  Fax: [phone] 1/24/18 1x order $98      $56 Voice Activated Desk Clock (EW - $98) -     Rear View Mirror - Wristbands x2 (EW - $56) -      DME: [employer] [facility] [phone]  Fax: [phone] 3/15/18 1x order $69 (EW) Talking watch - silver band (EW - $69) -      DME: [employer] [facility] [phone]  Fax: [phone] 3/20/18 1x order $60 (EW) Limb/stump socks ($60) EW -      DME: [employer] [facility] [phone]  Fax: [phone] 4/24/18 1x order $25 (EW) Reacher ($25) EW -                 Cervicalgia 01/15/2015     Priority: Medium     GIB (gastrointestinal bleeding) 08/21/2014     Priority: Medium     Intestinal malabsorption 08/06/2014     Priority: Medium     updating diagnosis code for icd10 cutover       Claudication in peripheral vascular disease (H) 04/22/2014     Priority: Medium     Person who has had sex change operation 01/20/2014     Priority: Medium     Vertigo 11/08/2013     Priority: Medium     AS (sickle cell trait) (H) 10/08/2013     Priority: Medium     Schizoaffective disorder (H) 06/07/2013     Priority: Medium     Osteoporosis 01/21/2013     Priority: Medium     Chronic obstructive pulmonary disease, unspecified COPD type (H) 01/01/2013     Priority: Medium     ACP (advance care planning) 09/13/2012     Priority: Medium     Advance Care Planning 7/19/2016: ACP Review of Chart / Resources Provided:  Reviewed chart for advance care plan.  Sonya Foote has an up to date advance care plan on file.  Added by Jamie Sharma  Advance Care Planning 5/5/2016: ACP Review of Chart / Resources Provided:  Reviewed chart for advance care plan.  Sonya Foote has an up to date advance care plan on file.  Added by Jamie Sharma  Patient states has Advance Directive and will bring in a copy to clinic. 9/13/2012          Hyperlipidemia LDL goal <100 09/04/2012     Priority: Medium     Seizure disorder (H) 09/04/2012     Priority: Medium     Adjustment  disorder with depressed mood 09/16/2009     Priority: Medium     Central retinal artery occlusion 08/19/2009     Priority: Medium     Anxiety disorder due to general medical condition 07/29/2009     Priority: Medium     Hx of colonic polyp 07/13/2009     Priority: Medium     Overview:   Colonoscopy completed on July 2009.  See report for full detail.  Please re refer in 5 years for repeat colon cancer screening if medically appropriate.  Need colyte 4/2 preparation.  Overview:   Colonoscopy completed on July 2009.  See report for full detail.  Please re refer in 5 years for repeat colon cancer screening if medically appropriate.  Need colyte 4/2 preparation.       Hereditary and idiopathic peripheral neuropathy 07/10/2009     Priority: Medium     Peripheral neuropathy 07/10/2009     Priority: Medium     Androgen insensitivity syndrome 03/23/2009     Priority: Medium     Testicular feminization 03/23/2009     Priority: Medium     Chronic pain syndrome 03/20/2009     Priority: Medium     Patient is followed by Allan Casey for ongoing prescription of pain meds  All refills should be approved by this provider, or covering partner.    Maximum quantity per month: 1 month  Clinic visit frequency required: Q 6  months     Controlled substance agreement:  Encounter-Level CSA:     There are no encounter-level csa.        Pain Clinic evaluation in the past: No    DIRE Total Score(s):  No flowsheet data found.    Last [facility] website verification:  6/6/18   [email or web address]       Thoracic or lumbosacral neuritis or radiculitis 03/20/2009     Priority: Medium     Lumbosacral radiculitis 03/20/2009     Priority: Medium     Disorder of bone and cartilage 05/24/2007     Priority: Medium     Osteopenia 05/24/2007     Priority: Medium     Single seizure (H) 05/24/2007     Priority: Medium     Late effect of stroke 07/13/2006     Priority: Medium     Cerebral infarction due to occlusion or stenosis of carotid artery  02/06/2006     Priority: Medium     Problem list name updated by automated process. Provider to review       Iron deficiency anemia 11/18/2005     Priority: Medium     Degeneration of intervertebral disc of lumbosacral region 11/18/2005     Priority: Medium     Overview:   with radiculopathy  Overview:   with radiculopathy       Old myocardial infarction 08/02/2005     Priority: Medium     Open-angle glaucoma 08/11/2003     Priority: Medium     Asthma 04/04/2003     Priority: Medium      Past Medical History:   Diagnosis Date     Anemia      Antiplatelet or antithrombotic long-term use      Arthritis      AS (sickle cell trait) (H) 10/8/2013     Blind left eye      BPPV (benign paroxysmal positional vertigo)      Chronic back pain      COPD (chronic obstructive pulmonary disease) (H)      Coronary artery disease      CVA (cerebral infarction) 10/8/2012    x3, residual left sided weakness     Diastolic CHF (H) 9/4/2012     Dilantin toxicity 5/31/2013     Esophageal candidiasis (H)      GERD (gastroesophageal reflux disease)      Heart attack      Hernia, abdominal      History of blood transfusion     x5     History of thrombophlebitis      HTN (hypertension) 9/4/2012     Hyperlipidemia LDL goal <100 9/4/2012     Legally blind in right eye, as defined in [place]     No periphal vision right eye     Osteoporosis 1/21/2013     Other chronic pain      PAD (peripheral artery disease) (H)      Palpitations      Person who has had sex change operation 1/20/2014     Pneumonia      Schizoaffective disorder (H)      Seizure disorder (H) 9/4/2012     Sleep apnea     CPAP     Thrombosis of leg      Type 2 diabetes, HbA1C goal < 8% (H) 9/4/2012     Uncomplicated asthma      Unspecified cerebral artery occlusion with cerebral infarction      Past Surgical History:   Procedure Laterality Date     AMPUTATE LEG ABOVE KNEE Left 6/11/2016    Procedure: AMPUTATE LEG ABOVE KNEE;  Surgeon: Mello Rodriguez MD;  Location:  OR      AMPUTATE LEG BELOW KNEE Right 11/7/2016    Procedure: AMPUTATE LEG BELOW KNEE;  Surgeon: Savannah Durant MD;  [facility]: [facility]     AMPUTATE REVISION STUMP LOWER EXTREMITY Right 11/11/2016    Procedure: AMPUTATE REVISION STUMP LOWER EXTREMITY;  Surgeon: Savannah Durant MD;  [facility]     AMPUTATE REVISION STUMP LOWER EXTREMITY Right 11/16/2016    Procedure: AMPUTATE REVISION STUMP LOWER EXTREMITY;  Surgeon: Savannah Durant MD;  [facility]     AMPUTATE TOE(S) Right 1/5/2016    Procedure: AMPUTATE TOE(S);  Surgeon: Mello Gaines DPM;  Location: [facility]     ANGIOGRAM Bilateral 11/21/2014    Procedure: ANGIOGRAM;  Surgeon: Savannah Durant MD;  [facility]     ANGIOGRAM Left 1/16/2015    Procedure: ANGIOGRAM;  Surgeon: Savannah Durant MD;  [facility]     ANGIOGRAM Bilateral 9/14/2015    Procedure: ANGIOGRAM;  Surgeon: Savannah Durant MD;  [facility]     ANGIOGRAM Left 10/12/2015    Procedure: ANGIOGRAM;  Surgeon: Savannah Durant MD;  [facility]     ANGIOGRAM Right 6/6/2016    Procedure: ANGIOGRAM;  Surgeon: Savannah Durant MD;  [facility]     ANGIOPLASTY Right 6/6/2016    Procedure: ANGIOPLASTY;  Surgeon: Savannah Durant MD;  [facility]     APPENDECTOMY       BREAST SURGERY      right breast bx (benign)     CATARACT IOL, RT/LT Right      CHOLECYSTECTOMY       COLONOSCOPY N/A 8/25/2014    Procedure: COLONOSCOPY;  Surgeon: Mello Ferrer MD;  [facility]     COLONOSCOPY WITH CO2 INSUFFLATION N/A 8/20/2014    Procedure: COLONOSCOPY WITH CO2 INSUFFLATION;  Surgeon: Duane, William Charles, MD;  Location: [facility]     CYSTOSTOMY, INSERT TUBE SUPRAPUBIC, COMBINED N/A 1/16/2018    Procedure: COMBINED CYSTOSTOMY, INSERT TUBE SUPRAPUBIC;  Cystoscopy, Intraoperative Ultrasound, Suprapubic Tube Placement;  Surgeon: Keanu Dawson MD;  [facility]     ENDARTERECTOMY FEMORAL  5/23/2014    Procedure: ENDARTERECTOMY FEMORAL;  Surgeon: Jason Joshi MD;  [facility]     ESOPHAGOSCOPY,  GASTROSCOPY, DUODENOSCOPY (EGD), COMBINED  12/14/2012    Procedure: COMBINED ESOPHAGOSCOPY, GASTROSCOPY, DUODENOSCOPY (EGD), BIOPSY SINGLE OR MULTIPLE;  ESOPHAGOSCOPY, GASTROSCOPY, DUODENOSCOPY (EGD), DILATATION ;  Surgeon: Elizabeth Stevenson MD;  Location: [facility]     ESOPHAGOSCOPY, GASTROSCOPY, DUODENOSCOPY (EGD), COMBINED  12/31/2013    Procedure: COMBINED ESOPHAGOSCOPY, GASTROSCOPY, DUODENOSCOPY (EGD), BIOPSY SINGLE OR MULTIPLE;;  Surgeon: Clemente Lopez MD;  Location:  GI     ESOPHAGOSCOPY, GASTROSCOPY, DUODENOSCOPY (EGD), COMBINED  4/1/2014    Procedure: COMBINED ESOPHAGOSCOPY, GASTROSCOPY, DUODENOSCOPY (EGD);;  Surgeon: Clemente Lopez MD;  Location: [facility]     ESOPHAGOSCOPY, GASTROSCOPY, DUODENOSCOPY (EGD), COMBINED  6/28/2014    Procedure: COMBINED ESOPHAGOSCOPY, GASTROSCOPY, DUODENOSCOPY (EGD);  Surgeon: Clemente Lopez MD;  Location:  GI     ESOPHAGOSCOPY, GASTROSCOPY, DUODENOSCOPY (EGD), COMBINED N/A 8/20/2014    Procedure: COMBINED ESOPHAGOSCOPY, GASTROSCOPY, DUODENOSCOPY (EGD), BIOPSY SINGLE OR MULTIPLE;  Surgeon: Duane, William Charles, MD;  Location: [facility]     ESOPHAGOSCOPY, GASTROSCOPY, DUODENOSCOPY (EGD), COMBINED N/A 8/22/2014    Procedure: COMBINED ESOPHAGOSCOPY, GASTROSCOPY, DUODENOSCOPY (EGD), BIOPSY SINGLE OR MULTIPLE;  Surgeon: Mello Ferrer MD;  Location:  GI     ESOPHAGOSCOPY, GASTROSCOPY, DUODENOSCOPY (EGD), COMBINED N/A 10/2/2014    Procedure: COMBINED ESOPHAGOSCOPY, GASTROSCOPY, DUODENOSCOPY (EGD), BIOPSY SINGLE OR MULTIPLE;  Surgeon: Remy Haskins MD;  Location:  GI     ESOPHAGOSCOPY, GASTROSCOPY, DUODENOSCOPY (EGD), COMBINED Left 12/15/2014    Procedure: COMBINED ESOPHAGOSCOPY, GASTROSCOPY, DUODENOSCOPY (EGD), BIOPSY SINGLE OR MULTIPLE;  Surgeon: Remy Haskins MD;  Location:  GI     ESOPHAGOSCOPY, GASTROSCOPY, DUODENOSCOPY (EGD), COMBINED N/A 2/25/2015    Procedure: COMBINED ENDOSCOPIC ULTRASOUND, ESOPHAGOSCOPY, GASTROSCOPY, DUODENOSCOPY (EGD), FINE NEEDLE  ASPIRATE/BIOPSY;  Surgeon: Clemente Lugo MD;  [facility]     ESOPHAGOSCOPY, GASTROSCOPY, DUODENOSCOPY (EGD), COMBINED Left 2/25/2015    Procedure: COMBINED ESOPHAGOSCOPY, GASTROSCOPY, DUODENOSCOPY (EGD), BIOPSY SINGLE OR MULTIPLE;  Surgeon: Clemente Lugo MD;  [facility]     ESOPHAGOSCOPY, GASTROSCOPY, DUODENOSCOPY (EGD), COMBINED N/A 9/25/2016    Procedure: COMBINED ESOPHAGOSCOPY, GASTROSCOPY, DUODENOSCOPY (EGD);  Surgeon: Aziza Patiño MD;  [facility]     ESOPHAGOSCOPY, GASTROSCOPY, DUODENOSCOPY (EGD), COMBINED N/A 1/18/2017    Procedure: COMBINED ESOPHAGOSCOPY, GASTROSCOPY, DUODENOSCOPY (EGD), BIOPSY SINGLE OR MULTIPLE;  Surgeon: Clemente Lopez MD;  [facility]     ESOPHAGOSCOPY, GASTROSCOPY, DUODENOSCOPY (EGD), COMBINED N/A 11/26/2017    Procedure: COMBINED ESOPHAGOSCOPY, GASTROSCOPY, DUODENOSCOPY (EGD), REMOVE FOREIGN BODY;  Esophagogastroduodenoscopy with foreign body extraction  ;  Surgeon: Herberth Castrejon MD;  [facility]     ESOPHAGOSCOPY, GASTROSCOPY, DUODENOSCOPY (EGD), COMBINED N/A 11/26/2017    Procedure: COMBINED ESOPHAGOSCOPY, GASTROSCOPY, DUODENOSCOPY (EGD), REMOVE FOREIGN BODY;;  Surgeon: Herberth Castrejon MD;  [facility]     FASCIOTOMY LOWER EXTREMITY Left 6/10/2016    Procedure: FASCIOTOMY LOWER EXTREMITY;  Surgeon: Mello Rodriguez MD;  [facility]     HC CAPSULE ENDOSCOPY N/A 8/25/2014    Procedure: CAPSULE/PILL CAM ENDOSCOPY;  Surgeon: Remy Haskins MD;  [facility]     HC CAPSULE ENDOSCOPY N/A 10/2/2014    Procedure: CAPSULE/PILL CAM ENDOSCOPY;  Surgeon: Remy Haskins MD;  [facility]     ORTHOPEDIC SURGERY      broken wrist repair     SEX TRANSFORMATION SURGERY, MALE TO FEMALE      1974     SINUS SURGERY      cyst removed     TONSILLECTOMY       VASCULAR SURGERY      Left carotid stent     Current Outpatient Prescriptions   Medication Sig Dispense Refill     ACETAMINOPHEN PO Take 1,000 mg by mouth every 4  hours as needed for pain Not to exceed 4000 mg/day       ADVAIR DISKUS 250-50 MCG/DOSE diskus inhaler Inhale 1 puff into the lungs 2 times daily 60 Inhaler 11     albuterol (2.5 MG/3ML) 0.083% neb solution INHALE 1 VIAL VIA NEBULIZER EVERY 6 HOURS AS NEEDED 360 mL 11     alendronate (FOSAMAX) 70 MG tablet Take 1 tablet (70 mg) by mouth with 8oz water every 7 days 30 minutes before breakfast and remain upright during this time. 12 tablet 3     aspirin EC 81 MG EC tablet Take 1 tablet (81 mg) by mouth daily 90 tablet 3     atorvastatin (LIPITOR) 40 MG tablet Take 1 tablet (40 mg) by mouth daily 90 tablet 2     blood glucose monitoring (ONE TOUCH ULTRA 2) meter device kit Use to test blood sugars 3 times daily or as directed. 1 kit 0     blood glucose monitoring (ONE TOUCH ULTRA) test strip Use to test blood sugars 3 times daily or as directed. 3 Box 3     blood glucose monitoring (ONE TOUCH ULTRASOFT) lancets Use to test blood sugar 3 times daily or as directed. 100 each PRN     brimonidine (ALPHAGAN) 0.2 % ophthalmic solution Place 1 drop into the right eye 2 times daily 1 Bottle 11     calcium citrate-vitamin D (CITRACAL) 315-250 MG-UNIT TABS Take 2 tablets by mouth daily 120 tablet 5     cetirizine (ZYRTEC) 10 MG tablet Take 1 tablet (10 mg) by mouth daily as needed for allergies 90 tablet 3     Cholecalciferol (VITAMIN D) 2000 UNITS tablet Take 2,000 Units by mouth daily. 100 tablet 3     CYANOCOBALAMIN PO Take 2,000 mcg by mouth daily       diclofenac (VOLTAREN) 1 % GEL topical gel Apply 2 g topically 4 times daily to hands 100 g 11     dorzolamide (TRUSOPT) 2 % ophthalmic solution Place 1 drop into the right eye 2 times daily 1 Bottle 11     EPINEPHrine (EPIPEN/ADRENACLICK/OR ANY BX GENERIC EQUIV) 0.3 MG/0.3ML injection 2-pack Inject 0.3 mLs (0.3 mg) into the muscle as needed for anaphylaxis 0.6 mL 1     escitalopram (LEXAPRO) 10 MG tablet Take 10 mg by mouth daily        ferrous sulfate (IRON) 325 (65 FE) MG  tablet Take 1 tablet (325 mg) by mouth 2 times daily With meals 60 tablet 2     fluticasone (FLONASE) 50 MCG/ACT nasal spray Spray 1 spray into both nostrils daily       hydrochlorothiazide (MICROZIDE) 12.5 MG capsule Take 1 capsule (12.5 mg) by mouth daily 90 capsule 2     lactulose (CHRONULAC) 10 GM/15ML solution Take 20 g by mouth as needed for constipation       latanoprost (XALATAN) 0.005 % ophthalmic solution Place 1 drop into both eyes At Bedtime 2.5 mL 11     levETIRAcetam (KEPPRA) 500 MG tablet Take 1 tablet (500 mg) by mouth 2 times daily 180 tablet 1     lidocaine (LIDODERM) 5 % Patch APPLY 1 TO 3 PATCHES TO PAINFUL AREA AT ONCE FOR UP TO 12 HOURS WITHIN A 24 HOUR PERIOD REMOVE AFTER 12 HOURS 30 patch 5     lisinopril (PRINIVIL/ZESTRIL) 20 MG tablet Take 1 tablet (20 mg) by mouth daily 90 tablet 0     MEDICATION GIVEN BY INTRATHECAL PUMP - INSTRUCTION continuous 2/8/18: Pump managed by [facility] in [place] [phone].   Conc: Bupivacaine 20 mg/mL and Fentanyl 2000 mcg/mL, morphine 2 mg/mL  Continuous:  Fentanyl 796 mcg/day, Bupivacaine 7.96 mg/day, Morphine 0.796 mg/day   Boluses: Up to 7 boluses per 24-hr period of Bupivicaine 0.5994 mg and Fentanyl 59.9 mcg morphine 0.0599 mg.  Pump Refill Date 02/28/18       metFORMIN (GLUCOPHAGE-XR) 500 MG 24 hr tablet Take 1 tablet (500 mg) by mouth daily (with dinner) 90 tablet 3     metoprolol succinate (TOPROL-XL) 50 MG 24 hr tablet Take 1 tablet (50 mg) by mouth daily 90 tablet 0     Multiple Vitamin (MULTIVITAMIN OR) Take 1 tablet by mouth daily        nicotine (NICODERM CQ) 14 MG/24HR 24 hr patch Place 1 patch onto the skin daily 30 patch 0     nitroglycerin (NITROSTAT) 0.4 MG SL tablet Place 1 tablet (0.4 mg) under the tongue every 5 minutes as needed for chest pain if you are still having symptoms after 3 doses (15 minutes) call 911. 25 tablet 1     ondansetron (ZOFRAN-ODT) 8 MG ODT tab Take 1 tablet (8 mg) by mouth every 8  hours as needed 30 tablet 5     order for DME Equipment being ordered: bandage tape, prefer paper 1 Package 0     order for DME Full electric hospital bed with half rails    Dx: K90912, I110, J449  Length of need: lifetime 1 Device 0     order for DME Wheel Chair Cushion: 18 x 18 inch Roho cushion 1 Device 0     order for DME Hospital bed with side rails 1 Device 0     order for DME Equipment being ordered: CPAP supplies mask, hose, filters, cushion    fax to [facility] at [phone] 10 Device prn     order for DME Equipment being ordered: CPAP supplies mask, hose, filters, cushion fax to [facility] at [phone]  Disp: 10 Device Refills: prn   Class: Local Print Start: 2/10/2017 1 Device 0     order for DME Equipment being ordered: Nebulizer and tubing supplies 1 Units 0     order for DME Equipment being ordered: Glucerna daily shakes with each meal 1 Box 11     ORDER FOR DME Equipment being ordered: Power Wheelchair 1 Device 0     ORDER FOR DME Equipment being ordered: Depends briefs 1 Month 11     oxyCODONE IR (ROXICODONE) 5 MG tablet Take 1 tablet (5 mg) by mouth every 6 hours as needed for pain 5 tablet 0     pantoprazole (PROTONIX) 40 MG EC tablet Take 1 tablet (40 mg) by mouth daily 30 tablet 5     phenytoin 200 MG CAPS Take 200 mg by mouth 2 times daily (Patient taking differently: Take 200 mg by mouth 2 times daily (takes at 8 AM and 8 PM)) 60 capsule 0     polyethylene glycol (MIRALAX/GLYCOLAX) Packet Take 17 g by mouth daily Dissolved in water or juice        pregabalin (LYRICA) 75 MG capsule Take 2 capsules (150 mg) by mouth 2 times daily 120 capsule 5     risperiDONE (RISPERDAL) 0.5 MG tablet Take 0.5 mg by mouth At Bedtime       sucralfate (CARAFATE) 1 GM tablet Take 1 tablet (1 g) by mouth 4 times daily May dissolve in 10 mL water is necessary. (Start upon completion of carafate suspension) 120 tablet 11     sulfamethoxazole-trimethoprim (BACTRIM DS/SEPTRA DS) 800-160 MG per tablet Take 1  tablet by mouth 2 times daily 14 tablet 0     traZODone (DESYREL) 50 MG tablet Take 150 mg by mouth At Bedtime        umeclidinium (INCRUSE ELLIPTA) 62.5 MCG/INH oral inhaler Inhale 1 puff into the lungs daily       VENTOLIN  (90 BASE) MCG/ACT Inhaler Inhale 2 puffs into the lungs every 6 hours as needed for shortness of breath / dyspnea or wheezing 3 Inhaler 5     zinc 50 MG TABS Take 1 tablet by mouth daily       OTC products: None, except as noted above and Aspirin    Allergies   Allergen Reactions     Bee Venom      Penicillins Anaphylaxis     Other reaction(s): Skin Rash and/or Hives     Dilantin [Phenytoin] Other (See Comments)     Generic dilantin only per pt     Iodide Hives     09/11/15: Pt states she can use iodine and doesn't have any problems      Iodine-131      Novocaine [Procaine] Hives     Other reaction(s): Skin Rash and/or Hives     Tositumomab       Latex Allergy: NO    Social History   Substance Use Topics     Smoking status: Current Every Day Smoker     Packs/day: 0.25     Years: 30.00     Types: Cigarettes, Cigars     Last attempt to quit: 11/1/2001     Smokeless tobacco: Never Used     Alcohol use No     History   Drug Use No       REVIEW OF SYSTEMS:   CONSTITUTIONAL: NEGATIVE for fever, chills, change in weight  INTEGUMENTARY/SKIN: NEGATIVE for worrisome rashes, moles or lesions  EYES: NEGATIVE for vision changes or irritation  ENT/MOUTH: NEGATIVE for ear, mouth and throat problems  RESP: NEGATIVE for significant cough or SOB  CV: NEGATIVE for chest pain, palpitations or peripheral edema  GI: NEGATIVE for nausea, abdominal pain, heartburn, or change in bowel habits  MUSCULOSKELETAL: NEGATIVE for significant arthralgias or myalgia  ENDOCRINE: NEGATIVE for temperature intolerance, skin/hair changes  HEME/ALLERGY/IMMUNE: NEGATIVE for bleeding problems  PSYCHIATRIC: NEGATIVE for changes in mood or affect  ROS otherwise negative    EXAM:   /74 (BP Location: Left arm, Patient Position:  "Chair, Cuff Size: Adult Regular)  Pulse 72  Temp 98  F (36.7  C) (Oral)  Resp 16  Ht 3' 7\" (1.092 m)  Wt 138 lb (62.6 kg)  BMI 52.47 kg/m2  GENERAL APPEARANCE: healthy, alert and no distress  HENT: ear canals and TM's normal and nose and mouth without ulcers or lesions  RESP: lungs clear to auscultation - no rales, rhonchi or wheezes  CV: regular rate and rhythm, normal S1 S2, no S3 or S4 and no murmur, click or rub   ABDOMEN: soft, nontender, no HSM or masses and bowel sounds normal  MS: amputee,     SKIN: no suspicious lesions or rashes  NEURO: Normal strength and tone, sensory exam grossly normal, mentation intact and speech normal  PSYCH: mentation appears normal and affect normal/bright    DIAGNOSTICS:   EKG: done 5/2018 appears normal, NSR, normal axis, normal intervals, no acute ST/T changes c/w ischemia, no LVH by voltage criteria, unchanged from previous tracings    Recent Labs   Lab Test  07/04/18   0732  06/06/18   1149  05/31/18   0206   05/02/18   0800   02/08/18   0843   12/05/17   1950   HGB  14.0   --   11.7   --    --    < >  13.4   < >  13.0   PLT  297   --   232   --    --    < >  256   < >  319   INR   --    --    --    --    --    --   1.04   --   1.07   NA  138   --   138   < >   --    < >  139   < >  140   POTASSIUM  3.5   --   3.5   < >   --    < >  4.0   < >  3.3*   CR  0.59   --   0.52   < >   --    < >  0.43*   < >  0.46*   A1C   --   5.1   --    --   4.4   --    --    --    --     < > = values in this interval not displayed.        IMPRESSION:   Reason for surgery/procedure: chronic pain, DJD lumbar sacral   Diagnosis/reason for consult: COPD, DM Type 2, CHF, CAD, HTN,     The proposed surgical procedure is considered INTERMEDIATE risk.    REVISED CARDIAC RISK INDEX  The patient has the following serious cardiovascular risks for perioperative complications such as (MI, PE, VFib and 3  AV Block):  Coronary Artery Disease (MI, positive stress test, angina, Qs on EKG)  Congestive Heart " Failure (pulmonary edema, PND, s3 debora, CXR with pulmonary congestion, basilar rales)  INTERPRETATION: 2 risks: Class III (moderate risk - 6.6% complication rate)    The patient has the following additional risks for perioperative complications:  No identified additional risks      ICD-10-CM    1. Preop general physical exam Z01.818    2. Chronic pain syndrome G89.4    3. Degeneration of intervertebral disc of lumbosacral region M51.37    4. Chronic obstructive pulmonary disease, unspecified COPD type (H) J44.9    5. Coronary artery disease of native artery of native heart with stable angina pectoris (H) I25.118    6. Type 2 diabetes mellitus with diabetic peripheral angiopathy without gangrene, without long-term current use of insulin (H) E11.51    7. Chronic systolic congestive heart failure (H) I50.22    8. Essential hypertension I10    9. Hyperlipidemia LDL goal <100 E78.5    10. Schizoaffective disorder, unspecified type (H) F25.9    11. AS (sickle cell trait) (H) D57.3    12. 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 (H) Z89.511        RECOMMENDATIONS:       Cardiovascular Risk  Patient is already on a Beta Blocker. Continue Betablocker therapy after surgery, using Beta blocker order set as necessary for NPO status.      Pulmonary Risk  Incentive spirometry post op  Maximize COPD treatment use inhalers        Obstructive Sleep Apnea (or suspected sleep apnea)  Patient is clearly advised to use their home CPAP when released from surgery      --Patient is to take all scheduled medications on the day of surgery EXCEPT for modifications listed below.    Diabetes Medication Use    -----Hold usual oral and non-insulin diabetic meds (e.g. Metformin, Actos, Glipizide) while NPO.       ACE Inhibitor or Angiotensin Receptor Blocker (ARB) Use  Ace inhibitor or Angiotensin Receptor Blocker (ARB) and will continue this medication     Patient will hold HCTZ am of surgery     Stop ASA today resume after  surgery     APPROVAL GIVEN to proceed with proposed procedure, without further diagnostic evaluation       Signed Electronically by: Vida Sanchez PA-C    Copy of this evaluation report is provided to requesting physician.    [place] Preop Guidelines    Revised Cardiac Risk Index

## 2018-07-13 NOTE — MR AVS SNAPSHOT
After Visit Summary   7/13/2018    Sonya Foote    MRN: [id number]           Patient Information     Date Of Birth          1950        Visit Information        Provider Department      7/13/2018 10:00 AM Vida Sanchez PA-C [facility]        Today's Diagnoses     Preop general physical exam    -  1    Chronic pain syndrome        Degeneration of intervertebral disc of lumbosacral region        Chronic obstructive pulmonary disease, unspecified COPD type (H)        Hyperlipidemia LDL goal <100        Schizoaffective disorder, unspecified type (H)        AS (sickle cell trait) (H)        Chronic systolic congestive heart failure (H)        Essential hypertension        Type 2 diabetes mellitus with diabetic peripheral angiopathy without gangrene, without long-term current use of insulin (H)        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 (H)        Coronary artery disease of native artery of native heart with stable angina pectoris (H)          Care Instructions    DO not take Hydrochlorothiazide or Metformin the morning of procedure  Resume after procedure    Take rest of morning medications with a sip of water day of surgery       Stop aspirin as of today- per pain clinic restart after surgery          Before Your Surgery      Call your surgeon if there is any change in your health. This includes signs of a cold or flu (such as a sore throat, runny nose, cough, rash or fever).    Do not smoke, drink alcohol or take over the counter medicine (unless your surgeon or primary care doctor tells you to) for the 24 hours before and after surgery.    If you take prescribed drugs: Follow your doctor s orders about which medicines to take and which to stop until after surgery.    Eating and drinking prior to surgery: follow the instructions from your surgeon    Take a shower or bath the night before surgery. Use the soap your surgeon gave you to gently  "clean your skin. If you do not have soap from your surgeon, use your regular soap. Do not shave or scrub the surgery site.  Wear clean pajamas and have clean sheets on your bed.           Follow-ups after your visit        Your next 10 appointments already scheduled     Jul 26, 2018 10:15 AM CDT   RETURN GLAUCOMA with Marely Robin MD   Eye Clinic ([facility])    [address]  9[address] Clin [address]  [facility] [id number] [phone]            Aug 13, 2018  9:00 AM CDT   (Arrive by 8:45 AM)   Return Visit with Alina Jennings MD   [facility] ([facility]    [address]  [address]  [facility] [address] [phone]              Who to contact     If you have questions or need follow up information about today's clinic visit or your schedule please contact [facility] directly at [phone].  Normal or non-critical lab and imaging results will be communicated to you by MyChart, letter or phone within 4 business days after the clinic has received the results. If you do not hear from us within 7 days, please contact the clinic through MyChart or phone. If you have a critical or abnormal lab result, we will notify you by phone as soon as possible.  Submit refill requests through [employer] or call your pharmacy and they will forward the refill request to us. Please allow 3 business days for your refill to be completed.          Additional Information About Your Visit        Care EveryWhere ID     This is your Care EveryWhere ID. This could be used by other organizations to access your [place] medical records  [id number]        Your Vitals Were     Pulse Temperature Respirations Height BMI (Body Mass Index)       72 98  F (36.7  C) (Oral) 16 3' 7\" (1.092 m) 52.47 kg/m2        Blood Pressure from Last 3 Encounters:   07/13/18 112/74   07/12/18 (P) 116/74   07/05/18 " 106/66    Weight from Last 3 Encounters:   07/13/18 138 lb (62.6 kg)   07/12/18 (P) 138 lb (62.6 kg)   07/05/18 138 lb (62.6 kg)              Today, you had the following     No orders found for display         Today's Medication Changes          These changes are accurate as of 7/13/18 10:30 AM.  If you have any questions, ask your nurse or doctor.               These medicines have changed or have updated prescriptions.        Dose/Directions    Phenytoin Sodium Extended 200 MG Caps   This may have changed:  additional instructions   Used for:  Seizure disorder (H)        Dose:  200 mg   Take 200 mg by mouth 2 times daily   Quantity:  60 capsule   Refills:  0                Primary Care Provider Office Phone # Fax #    Allan Casey [phone] [address] [place] [phone]        Equal Access to Services     KARI CARREON : Hadii aad ku hadasho Sojoe, waaxda luqadaha, qaybta kaalmada shashank, tonie marvin . So [facility] [phone].    ATENCIÓN: Si habla español, tiene a briones disposición servicios gratuitos de asistencia lingüística. Llame al [phone].    We comply with applicable federal civil rights laws and [place] laws. We do not discriminate on the basis of race, color, national origin, age, disability, sex, sexual orientation, or gender identity.            Thank you!     Thank you for choosing [facility]  for your care. Our goal is always to provide you with excellent care. Hearing back from our patients is one way we can continue to improve our services. Please take a few minutes to complete the written survey that you may receive in the mail after your visit with us. Thank you!             Your Updated Medication List - Protect others around you: Learn how to safely use, store and throw away your medicines at www.disposemymeds.org.          This list is accurate as of 7/13/18 10:30 AM.  Always use your most  recent med list.                   Brand Name Dispense Instructions for use Diagnosis    ACETAMINOPHEN PO      Take 1,000 mg by mouth every 4 hours as needed for pain Not to exceed 4000 mg/day        ADVAIR DISKUS 250-50 MCG/DOSE diskus inhaler   Generic drug:  fluticasone-salmeterol     60 Inhaler    Inhale 1 puff into the lungs 2 times daily    Acute bronchitis       * albuterol (2.5 MG/3ML) 0.083% neb solution     360 mL    INHALE 1 VIAL VIA NEBULIZER EVERY 6 HOURS AS NEEDED    Chronic obstructive pulmonary disease, unspecified COPD type (H)       * VENTOLIN  (90 Base) MCG/ACT Inhaler   Generic drug:  albuterol     3 Inhaler    Inhale 2 puffs into the lungs every 6 hours as needed for shortness of breath / dyspnea or wheezing    Chronic obstructive pulmonary disease, unspecified COPD type (H)       alendronate 70 MG tablet    FOSAMAX    12 tablet    Take 1 tablet (70 mg) by mouth with 8oz water every 7 days 30 minutes before breakfast and remain upright during this time.    Age-related osteoporosis without current pathological fracture       aspirin 81 MG EC tablet     90 tablet    Take 1 tablet (81 mg) by mouth daily    Unstable angina (H)       atorvastatin 40 MG tablet    LIPITOR    90 tablet    Take 1 tablet (40 mg) by mouth daily    Hyperlipidemia LDL goal <100       blood glucose monitoring lancets     100 each    Use to test blood sugar 3 times daily or as directed.    Type 2 diabetes mellitus with diabetic peripheral angiopathy without gangrene, without long-term current use of insulin (H)       blood glucose monitoring meter device kit     1 kit    Use to test blood sugars 3 times daily or as directed.    Type 2 diabetes, HbA1C goal < 8% (H)       blood glucose monitoring test strip    ONETOUCH ULTRA    3 Box    Use to test blood sugars 3 times daily or as directed.    Type 2 diabetes mellitus with diabetic peripheral angiopathy without gangrene, without long-term current use of insulin (H)        brimonidine 0.2 % ophthalmic solution    ALPHAGAN    1 Bottle    Place 1 drop into the right eye 2 times daily    Primary open angle glaucoma of both eyes, severe stage       calcium citrate-vitamin D 315-250 MG-UNIT Tabs per tablet    CITRACAL    120 tablet    Take 2 tablets by mouth daily    Osteoporosis       cetirizine 10 MG tablet    zyrTEC    90 tablet    Take 1 tablet (10 mg) by mouth daily as needed for allergies    Seasonal allergic rhinitis, unspecified allergic rhinitis trigger       CYANOCOBALAMIN PO      Take 2,000 mcg by mouth daily        diclofenac 1 % Gel topical gel    VOLTAREN    100 g    Apply 2 g topically 4 times daily to hands    Primary osteoarthritis of both hands       dorzolamide 2 % ophthalmic solution    TRUSOPT    1 Bottle    Place 1 drop into the right eye 2 times daily    Primary open angle glaucoma of both eyes, severe stage       EPINEPHrine 0.3 MG/0.3ML injection 2-pack    EPIPEN/ADRENACLICK/or ANY BX GENERIC EQUIV    0.6 mL    Inject 0.3 mLs (0.3 mg) into the muscle as needed for anaphylaxis    Bee sting reaction, undetermined intent, subsequent encounter       escitalopram 10 MG tablet    LEXAPRO     Take 10 mg by mouth daily        ferrous sulfate 325 (65 Fe) MG tablet    IRON    60 tablet    Take 1 tablet (325 mg) by mouth 2 times daily With meals        fluticasone 50 MCG/ACT spray    FLONASE     Spray 1 spray into both nostrils daily        hydrochlorothiazide 12.5 MG capsule    MICROZIDE    90 capsule    Take 1 capsule (12.5 mg) by mouth daily    Essential hypertension with goal blood pressure less than 130/80       INCRUSE ELLIPTA 62.5 MCG/INH oral inhaler   Generic drug:  umeclidinium      Inhale 1 puff into the lungs daily        lactulose 10 GM/15ML solution    CHRONULAC     Take 20 g by mouth as needed for constipation        latanoprost 0.005 % ophthalmic solution    XALATAN    2.5 mL    Place 1 drop into both eyes At Bedtime    Primary open angle glaucoma of both  eyes, severe stage       levETIRAcetam 500 MG tablet    KEPPRA    180 tablet    Take 1 tablet (500 mg) by mouth 2 times daily    Nausea       lidocaine 5 % Patch    LIDODERM    30 patch    APPLY 1 TO 3 PATCHES TO PAINFUL AREA AT ONCE FOR UP TO 12 HOURS WITHIN A 24 HOUR PERIOD REMOVE AFTER 12 HOURS    Chronic pain syndrome, 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 (H)       lisinopril 20 MG tablet    PRINIVIL/ZESTRIL    90 tablet    Take 1 tablet (20 mg) by mouth daily    History of coronary artery disease       MEDICATION GIVEN BY INTRATHECAL PUMP - INSTRUCTION      continuous 2/8/18: Pump managed by [facility] in [place] [phone].  Conc: Bupivacaine 20 mg/mL and Fentanyl 2000 mcg/mL, morphine 2 mg/mL Continuous:  Fentanyl 796 mcg/day, Bupivacaine 7.96 mg/day, Morphine 0.796 mg/day  Boluses: Up to 7 boluses per 24-hr period of Bupivicaine 0.5994 mg and Fentanyl 59.9 mcg morphine 0.0599 mg. Pump Refill Date 02/28/18        metFORMIN 500 MG 24 hr tablet    GLUCOPHAGE-XR    90 tablet    Take 1 tablet (500 mg) by mouth daily (with dinner)    Type 2 diabetes mellitus with diabetic peripheral angiopathy and gangrene, without long-term current use of insulin (H)       metoprolol succinate 50 MG 24 hr tablet    TOPROL-XL    90 tablet    Take 1 tablet (50 mg) by mouth daily    History of coronary artery disease       MULTIVITAMIN PO      Take 1 tablet by mouth daily        nicotine 14 MG/24HR 24 hr patch    NICODERM CQ    30 patch    Place 1 patch onto the skin daily    Tobacco dependence syndrome       nitroGLYcerin 0.4 MG sublingual tablet    NITROSTAT    25 tablet    Place 1 tablet (0.4 mg) under the tongue every 5 minutes as needed for chest pain if you are still having symptoms after 3 doses (15 minutes) call 911.    Chronic systolic congestive heart failure (H), Old myocardial infarction       ondansetron 8 MG ODT tab    ZOFRAN-ODT    30 tablet    Take 1 tablet (8 mg)  by mouth every 8 hours as needed    Other migraine without status migrainosus, not intractable       order for DME     1 Month    Equipment being ordered: Depends briefs    Incontinence       order for DME     1 Device    Equipment being ordered: Power Wheelchair    CVA (cerebral infarction), HTN (hypertension)       order for DME     1 Box    Equipment being ordered: Glucerna daily shakes with each meal    Type 2 diabetes mellitus with other diabetic neurological complication       * order for DME     1 Units    Equipment being ordered: Nebulizer and tubing supplies    Simple chronic bronchitis (H)       * order for DME     1 Device    Equipment being ordered: CPAP supplies mask, hose, filters, cushion fax to [facility] at [phone] Disp: 10 DeviceRefills: prn Class: Local PrintStart: 2/10/2017    Chronic obstructive pulmonary disease, unspecified COPD type (H)       * order for DME     10 Device    Equipment being ordered: CPAP supplies mask, hose, filters, cushion  fax to [facility] at [phone]    COPD (chronic obstructive pulmonary disease) (H)       * order for DME     1 Device    Hospital bed with side rails    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 (H)       * order for DME     1 Device    Full electric hospital bed with half rails  Dx: V09026, I110, J449 Length of need: lifetime    Status post bilateral above knee amputation (H)       * order for DME     1 Device    Wheel Chair Cushion: 18 x 18 inch Roho cushion    Status post bilateral above knee amputation (H)       order for DME     1 Package    Equipment being ordered: bandage tape, prefer paper    Burn of skin       oxyCODONE IR 5 MG tablet    ROXICODONE    5 tablet    Take 1 tablet (5 mg) by mouth every 6 hours as needed for pain    Bladder spasm       pantoprazole 40 MG EC tablet    PROTONIX    30 tablet    Take 1 tablet (40 mg) by mouth daily    Gastroesophageal reflux disease without esophagitis       Phenytoin  Sodium Extended 200 MG Caps     60 capsule    Take 200 mg by mouth 2 times daily    Seizure disorder (H)       polyethylene glycol Packet    MIRALAX/GLYCOLAX     Take 17 g by mouth daily Dissolved in water or juice        pregabalin 75 MG capsule    LYRICA    120 capsule    Take 2 capsules (150 mg) by mouth 2 times daily    Pain in both upper extremities       risperiDONE 0.5 MG tablet    risperDAL     Take 0.5 mg by mouth At Bedtime        sucralfate 1 GM tablet    CARAFATE    120 tablet    Take 1 tablet (1 g) by mouth 4 times daily May dissolve in 10 mL water is necessary. (Start upon completion of carafate suspension)    Adjustment disorder with depressed mood       sulfamethoxazole-trimethoprim 800-160 MG per tablet    BACTRIM DS/SEPTRA DS    14 tablet    Take 1 tablet by mouth 2 times daily    Dysuria       traZODone 50 MG tablet    DESYREL     Take 150 mg by mouth At Bedtime        vitamin D 2000 units tablet     100 tablet    Take 2,000 Units by mouth daily.        zinc 50 MG Tabs      Take 1 tablet by mouth daily        * Notice:  This list has 8 medication(s) that are the same as other medications prescribed for you. Read the directions carefully, and ask your doctor or other care provider to review them with you.

## 2018-07-14 LAB
BACTERIA SPEC CULT: NO GROWTH
SPECIMEN SOURCE: NORMAL

## 2018-07-16 NOTE — PROGRESS NOTES
"July 5, 2018    Return visit    Patient returns today for follow up.  Happy with the SPT but having bladder spasms that are bothersome.  She continues to have UTI.  She is here to inquire about options.  She does have a history of open angle glaucoma and significant peripheral vascular disease.  She is getting ready to move in with her sister who is a retired nurse.  She denies any changes in her health since last visit.    /66  Pulse 82  Ht 1.092 m (3' 7\")  Wt 62.6 kg (138 lb)  BMI 52.47 kg/m2  She is comfortable, in no distress, non-labored breathing.  Abdomen is soft, non-tender, non-distended.      A/P: 68 year old F with functional incontinence with SPT in placed with bladder spasms    We discussed supplements for UTI prevention    We discussed that given her glaucoma and the severe vascular disease she is not a good candidate for oral medications for bladder spasms.  We discussed that we could consider intravesical botulinum toxin injection but that we would likely want to do some urodynamics first    She will have this readdressed when she re-establishes care in [place]    At this time she will return to see us as needed    15 minutes were spent with the patient today, > 50% in counseling and coordination of care    Elizabeth Oliver MD MPH   of Urology    CC  Patient Care Team:  Allan Casey MD as PCP - General (Internal Medicine)  Lexii Cardenas, RN as Registered Nurse (Diabetes Education)  Shavon Elam RD as Registered Dietitian (Nutrition)  Jamie Sharma, RN as Lead Care Coordinator  Ernestine Weldon as Other (see comments)  Michelle Irizarry MD as MD (Internal Medicine)  Savannah Durant MD as MD (Vascular Surgery)  Allan Casey MD as MD (Internal Medicine)  Dayna Ellis NP as Nurse Practitioner (Nurse Practitioner)  Self, Referred, MD as Referring Physician  Janusz Rm MD as MD (Ophthalmology)  Alina Jennings MD as MD " (Pulmonary)  Mello Arroyo MD as MD (Family Medicine - Sports Medicine)  Mary Elliott OT as Occupational Therapist (Occupational Therapy)  Tonie Syed, RN as Nurse Coordinator (Vascular Surgery)  Salome Neri Cynthia See Ming, MD as MD (Urology)  Yi Mathews, RN as Registered Nurse (Urology)  Amalia Oliver MD as MD (Urology)  Yi Mathews, RN as Registered Nurse (Urology)  Keanu Dawson MD as MD (Urology)  Ginger Cornelius, RN as Registered Nurse (Urology)  Marely Robin MD as MD (Ophthalmology)  AMALIA OLIVER

## 2018-07-18 ENCOUNTER — DOCUMENTATION ONLY (OUTPATIENT)
Dept: CARE COORDINATION | Facility: CLINIC | Age: 68
End: 2018-07-18
Payer: COMMERCIAL

## 2018-07-18 ENCOUNTER — PATIENT OUTREACH (OUTPATIENT)
Dept: GERIATRIC MEDICINE | Facility: CLINIC | Age: 68
End: 2018-07-18

## 2018-07-18 NOTE — PROGRESS NOTES
[facility] now requests orders and shares plan of care/discharge summaries for some patients through [employer].  Please REPLY TO THIS MESSAGE OR ROUTE BACK TO THE AUTHOR in order to give authorization for orders when needed.  This is considered a verbal order, you will still receive a faxed copy of orders for signature.  Thank you for your assistance in improving collaboration for our patients.    ORDER    Changed in SN visits    Need SN visits weekly for 5 week with 2 PRN for pain management and s/p Baclofen pump placement care. Thankyou.

## 2018-07-18 NOTE — PROGRESS NOTES
CM received call from member stating that she had her pain pump replaced surgery yesterday.  She states she is having pain.  She is also confused on some d/c orders.  She has abdominal binder but is unsure how long she should be wearing.  She also has a pain cream along with atbx but is unsure.  She states that she showed the nurse at [place] this weekend the cream and atbx as she was given this the 13th and the nurse told her to just keep in her room.   CM and member conference called to [facility] - left  for nurse line.   CM also called nurse station and [place] - spoke with Leslie.  She will go visit with member to inquire on cream and atbx.    Member called back and states that she just was lectured from Leslie about keeping the meds in her room.   Leslie called CM back and states that she spoke with member and the cream is not pain but topical for post surgery.  She states they will call [facility] and get clarification on the cream and atbx.  She also states that she gave Sonya a prn Morphine at 0344.   They just received the orders for Percocet and hoping to have from pharmacy on the next run today.      CM received call back from Meena, RN with [facility] - she states member is to wear abdominal binder 6-8 weeks  - she can take off when she is lying down however if she is a restless sleeper then she needs to wear.   The cream and atbx were prescribed to be used post surgery however she sates orders are not clear.  She will f/u and then [facility] [place] nursing staff know.  She also has their fax information.    Relayed information to member - also discussed need for prn and/or more frequent nurse visit with [facility] - member states she had asked before and was told they only come 1x/mo for her suprapubic catheter and can't come more often.  CM called [facility] - spoke with Torie at [phone]. She will f/u with nurse and get more visits scheduled.  Nurse Vanegas is due to see member - she will f/u with her.      CM placed call back to inform member - EDWARDO Bella with [facility] was at member's  - spoke with Guilherme - she states she will get order for more f/u visits and will review d/c instructions with member to clarify everything.     Jamie Sharma RN, N  [employer]

## 2018-07-19 DIAGNOSIS — Z86.79 HISTORY OF CORONARY ARTERY DISEASE: ICD-10-CM

## 2018-07-19 DIAGNOSIS — I10 ESSENTIAL HYPERTENSION: Primary | ICD-10-CM

## 2018-07-19 RX ORDER — METOPROLOL SUCCINATE 50 MG/1
TABLET, EXTENDED RELEASE ORAL
Qty: 90 TABLET | Refills: 3 | Status: SHIPPED | OUTPATIENT
Start: 2018-07-19 | End: 2019-06-05

## 2018-07-19 RX ORDER — LISINOPRIL 20 MG/1
TABLET ORAL
Qty: 90 TABLET | Refills: 3 | Status: SHIPPED | OUTPATIENT
Start: 2018-07-19 | End: 2019-06-05

## 2018-07-19 NOTE — TELEPHONE ENCOUNTER
"Requested Prescriptions   Pending Prescriptions Disp Refills     metoprolol succinate (TOPROL-XL) 50 MG 24 hr tablet [Pharmacy Med Name: METOPROLOL SUCC 50MG ER TAB] 90 tablet      Sig: TAKE 1 TABLET BY MOUTH DAILY    Beta-Blockers Protocol Passed    7/19/2018  2:44 PM       Passed - Blood pressure under 140/90 in past 12 months    BP Readings from Last 3 Encounters:   07/13/18 112/74   07/12/18 (P) 116/74 07/05/18 106/66                Passed - Patient is age 6 or older       Passed - Recent (12 mo) or future (30 days) visit within the authorizing provider's specialty    Patient had office visit in the last 12 months or has a visit in the next 30 days with authorizing provider or within the authorizing provider's specialty.  See \"Patient Info\" tab in inbasket, or \"Choose Columns\" in Meds & Orders section of the refill encounter.            lisinopril (PRINIVIL/ZESTRIL) 20 MG tablet [Pharmacy Med Name: LISINOPRIL 20MG TAB] 90 tablet      Sig: TAKE 1 TABLET BY MOUTH DAILY DX:HYPERTENSION    ACE Inhibitors (Including Combos) Protocol Passed    7/19/2018  2:44 PM       Passed - Blood pressure under 140/90 in past 12 months    BP Readings from Last 3 Encounters:   07/13/18 112/74 07/12/18 (P) 116/74 07/05/18 106/66                Passed - Recent (12 mo) or future (30 days) visit within the authorizing provider's specialty    Patient had office visit in the last 12 months or has a visit in the next 30 days with authorizing provider or within the authorizing provider's specialty.  See \"Patient Info\" tab in inbasket, or \"Choose Columns\" in Meds & Orders section of the refill encounter.           Passed - Patient is age 18 or older       Passed - No active pregnancy on record       Passed - Normal serum creatinine on file in past 12 months    Recent Labs   Lab Test  07/04/18   0732   02/25/16   1440   CR  0.59   < >   --    CRPOC   --    --   0.5    < > = values in this interval not displayed.            Passed - " Normal serum potassium on file in past 12 months    Recent Labs   Lab Test  07/04/18   0732   POTASSIUM  3.5            Passed - No positive pregnancy test in past 12 months        Prescription approved per [facility] Refill Protocol.

## 2018-07-23 NOTE — PROGRESS NOTES
Member had f/u with [facility] today. She continues on the Percocet every 4 hours.   Added some medication to pump today.  She has f/u next week.     Jamie Sharma RN, N  [facility]

## 2018-07-23 NOTE — PATIENT INSTRUCTIONS
Continue metformin  mg daily  Please follow up with primary doctor regularly for diabetes and bone density  Good luck for your move. It is pleasure to take of you all these years!    If you have any questions, please do not hesitate to call [phone] and ask for Endocrinology clinic.      After clinic hours, please contact [phone] and ask for Endocrinologist-on call    Sincerely,    Michelle Irizarry MD  Endocrinology    
Patent

## 2018-07-26 ENCOUNTER — OFFICE VISIT (OUTPATIENT)
Dept: OPHTHALMOLOGY | Facility: CLINIC | Age: 68
End: 2018-07-26
Attending: OPHTHALMOLOGY
Payer: COMMERCIAL

## 2018-07-26 DIAGNOSIS — H40.1133 PRIMARY OPEN ANGLE GLAUCOMA OF BOTH EYES, SEVERE STAGE: Primary | ICD-10-CM

## 2018-07-26 DIAGNOSIS — Z96.1 PSEUDOPHAKIA OF RIGHT EYE: ICD-10-CM

## 2018-07-26 ASSESSMENT — CONF VISUAL FIELD
OS_INFERIOR_TEMPORAL_RESTRICTION: 1
OS_INFERIOR_NASAL_RESTRICTION: 1
OS_SUPERIOR_NASAL_RESTRICTION: 1
OS_SUPERIOR_TEMPORAL_RESTRICTION: 1

## 2018-07-26 ASSESSMENT — VISUAL ACUITY
OD_CC: 20/150
OD_PH_CC: 20/125+
METHOD: SNELLEN - LINEAR
OS_CC: NLP

## 2018-07-26 ASSESSMENT — TONOMETRY
IOP_METHOD: TONOPEN
OD_IOP_MMHG: 13
OS_IOP_MMHG: 14

## 2018-07-26 ASSESSMENT — EXTERNAL EXAM - RIGHT EYE: OD_EXAM: NORMAL

## 2018-07-26 ASSESSMENT — PACHYMETRY
OD_CT(UM): 539
OS_CT(UM): 540

## 2018-07-26 ASSESSMENT — SLIT LAMP EXAM - LIDS
COMMENTS: NORMAL
COMMENTS: NORMAL

## 2018-07-26 ASSESSMENT — EXTERNAL EXAM - LEFT EYE: OS_EXAM: NORMAL

## 2018-07-26 NOTE — PROGRESS NOTES
1)POAG/?LTG vs Glc Suspect -- s/p SLT OD (3/13/15), H/O negative head CT and MRI 2013,2014 for headaches, ?retinal vascular event in the right eye in the past.-- K pachy: 539/540   Tmax: OD:23    HVF: OD:[place] L[place] in 2016 (unable to transfer out of chair)     CDR: 0.7/0.9 (pallor OS>OD)    HRT/OCT: Severe RNFL thinning OS>OD       FHX of Glc: Father, grandparents -- lost vision, was on gtts     Gonio:       Intolerant to:      Asthma/COPD: Yes, on inhalers but tolerating topical and po BB   Steroid Use: No    Kidney Stones: No    Sulfa Allergy: No     IOP targets:  2)PCIOL OD -- vision loss in the right eye predates 2013 (pt believes she lost all vision in 2005) and she had several MRI's in 2013 including one with orbital sequences which did not reveal any structural change to cause a right optic neuropathy.  3)NS OS -- has had low vision eval with <roxanne Ruff  4)DM s DR  5)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  -- ?acute central retinal / ophthalmic artery thromboembolic event   6)H/O CVA  -- She is a severe vasculopath based on past medical history of several central nervous system strokes, coronary artery disease with CHF, severe hypertension, type II diabetes mellitus, and peripheral vascular disease.  7)COLLEEN    MD: pt moving to [place] to be closer to family    Patient will continue on Cosopt (Timolol/Dorzolamide) which is a blue top drop 2x/day (12 hours apart) in the RIGHT EYE ONLY, Latanoprost which is a teal top drop at bedtime in both eyes, and Alphagan (Brimonidine) which is a purple top drop 2x/day (12 hours apart) in the RIGHT EYE ONLY.  Patient will follow up in [place] in 1-2 months with evaluation.      Resident note:  IOP okay today.  VA right eye slightly worse than before  Complains of left eye pain, headache from photosensitivity (wearing eye shades) - would recommend tinted lenses  Continue present management Copsopt, latanoprost, alphagan right eye   Patient will be moving to [place] in one month,  encouraged her establish care in [place] and follow up with an ophthalmologist in 3 months    Deandre Porter MD  PGY-3 Ophthalmology Resident  [phone]    Attending Physician Attestation:  Complete documentation of historical and exam elements from today's encounter can be found in the full encounter summary report (not reduplicated in this progress note). I personally obtained the chief complaint(s) and history of present illness.  I confirmed and edited as necessary the review of systems, past medical/surgical history, family history, social history, and examination findings as documented by others; and I examined the patient myself. I personally reviewed the relevant tests, images, and reports as documented above. I formulated and edited as necessary the assessment and plan and discussed the findings and management plan with the patient and family.  - Marely Robin MD

## 2018-07-26 NOTE — MR AVS SNAPSHOT
After Visit Summary   7/26/2018    Sonya Foote    MRN: [id number]           Patient Information     Date Of Birth          1950        Visit Information        Provider Department      7/26/2018 10:15 AM Marely Robin MD [facility]        Today's Diagnoses     Primary open angle glaucoma of both eyes, severe stage    -  1    Pseudophakia of right eye          Care Instructions    Patient will continue on Cosopt (Timolol/Dorzolamide) which is a blue top drop 2x/day (12 hours apart) in the RIGHT EYE ONLY, Latanoprost which is a teal top drop at bedtime in both eyes, and Alphagan (Brimonidine) which is a purple top drop 2x/day (12 hours apart) in the RIGHT EYE ONLY.  Patient will follow up in [place] in 1-2 months with evaluation.          Follow-ups after your visit        Your next 10 appointments already scheduled     Jul 27, 2018 11:00 AM CDT   SHORT with Vida Sanchez PA-C   [facility]    [address] [address] [phone]            Aug 13, 2018  9:00 AM CDT   (Arrive by 8:45 AM)   Return Visit with Alina Jennings MD   [facility] ([facility])    [address] [place] [address]  [address] [place] [address] [phone]              Who to contact     Please call your clinic at [phone] to:    Ask questions about your health    Make or cancel appointments    Discuss your medicines    Learn about your test results    Speak to your doctor            Additional Information About Your Visit        Care EveryWhere ID     This is your Care EveryWhere ID. This could be used by other organizations to access your [place] medical records  [id number]         Blood Pressure from Last 3 Encounters:   07/13/18 112/74   07/12/18 (P) 116/74   07/05/18 106/66    Weight from Last 3 Encounters:   07/13/18 62.6 kg (138 lb)   07/12/18  (P) 62.6 kg (138 lb)   07/05/18 62.6 kg (138 lb)              Today, you had the following     No orders found for display         Today's Medication Changes          These changes are accurate as of 7/26/18 10:50 AM.  If you have any questions, ask your nurse or doctor.               These medicines have changed or have updated prescriptions.        Dose/Directions    Phenytoin Sodium Extended 200 MG Caps   This may have changed:  additional instructions   Used for:  Seizure disorder (H)        Dose:  200 mg   Take 200 mg by mouth 2 times daily   Quantity:  60 capsule   Refills:  0                Primary Care Provider Office Phone # Fax #    Allan Casey [phone]       [address] [place] [id number]        Equal Access to Services     KARI CARREON : John Cohen, waaxda newton, qaybta kaalmada shashank, tonie marvin . [facility] [phone].    ATENCIÓN: Si habla español, tiene a briones disposición servicios gratuitos de asistencia lingüística. [facility] [phone].    We comply with applicable federal civil rights laws and [place] laws. We do not discriminate on the basis of race, color, national origin, age, disability, sex, sexual orientation, or gender identity.            Thank you!     Thank you for choosing [facility]  for your care. Our goal is always to provide you with excellent care. Hearing back from our patients is one way we can continue to improve our services. Please take a few minutes to complete the written survey that you may receive in the mail after your visit with us. Thank you!             Your Updated Medication List - Protect others around you: Learn how to safely use, store and throw away your medicines at www.disposemymeds.org.          This list is accurate as of 7/26/18 10:50 AM.  Always use your most recent med list.                   Brand Name Dispense Instructions for use Diagnosis    ACETAMINOPHEN PO       Take 1,000 mg by mouth every 4 hours as needed for pain Not to exceed 4000 mg/day        ADVAIR DISKUS 250-50 MCG/DOSE diskus inhaler   Generic drug:  fluticasone-salmeterol     60 Inhaler    Inhale 1 puff into the lungs 2 times daily    Acute bronchitis       * albuterol (2.5 MG/3ML) 0.083% neb solution     360 mL    INHALE 1 VIAL VIA NEBULIZER EVERY 6 HOURS AS NEEDED    Chronic obstructive pulmonary disease, unspecified COPD type (H)       * VENTOLIN  (90 Base) MCG/ACT Inhaler   Generic drug:  albuterol     3 Inhaler    Inhale 2 puffs into the lungs every 6 hours as needed for shortness of breath / dyspnea or wheezing    Chronic obstructive pulmonary disease, unspecified COPD type (H)       alendronate 70 MG tablet    FOSAMAX    12 tablet    Take 1 tablet (70 mg) by mouth with 8oz water every 7 days 30 minutes before breakfast and remain upright during this time.    Age-related osteoporosis without current pathological fracture       aspirin 81 MG EC tablet     90 tablet    Take 1 tablet (81 mg) by mouth daily    Unstable angina (H)       atorvastatin 40 MG tablet    LIPITOR    90 tablet    Take 1 tablet (40 mg) by mouth daily    Hyperlipidemia LDL goal <100       blood glucose monitoring lancets     100 each    Use to test blood sugar 3 times daily or as directed.    Type 2 diabetes mellitus with diabetic peripheral angiopathy without gangrene, without long-term current use of insulin (H)       blood glucose monitoring meter device kit     1 kit    Use to test blood sugars 3 times daily or as directed.    Type 2 diabetes, HbA1C goal < 8% (H)       blood glucose monitoring test strip    ONETOUCH ULTRA    3 Box    Use to test blood sugars 3 times daily or as directed.    Type 2 diabetes mellitus with diabetic peripheral angiopathy without gangrene, without long-term current use of insulin (H)       brimonidine 0.2 % ophthalmic solution    ALPHAGAN    1 Bottle    Place 1 drop into the right eye 2 times daily     Primary open angle glaucoma of both eyes, severe stage       calcium citrate-vitamin D 315-250 MG-UNIT Tabs per tablet    CITRACAL    120 tablet    Take 2 tablets by mouth daily    Osteoporosis       cetirizine 10 MG tablet    zyrTEC    90 tablet    Take 1 tablet (10 mg) by mouth daily as needed for allergies    Seasonal allergic rhinitis, unspecified allergic rhinitis trigger       CYANOCOBALAMIN PO      Take 2,000 mcg by mouth daily        diclofenac 1 % Gel topical gel    VOLTAREN    100 g    Apply 2 g topically 4 times daily to hands    Primary osteoarthritis of both hands       dorzolamide 2 % ophthalmic solution    TRUSOPT    1 Bottle    Place 1 drop into the right eye 2 times daily    Primary open angle glaucoma of both eyes, severe stage       EPINEPHrine 0.3 MG/0.3ML injection 2-pack    EPIPEN/ADRENACLICK/or ANY BX GENERIC EQUIV    0.6 mL    Inject 0.3 mLs (0.3 mg) into the muscle as needed for anaphylaxis    Bee sting reaction, undetermined intent, subsequent encounter       escitalopram 10 MG tablet    LEXAPRO     Take 10 mg by mouth daily        ferrous sulfate 325 (65 Fe) MG tablet    IRON    60 tablet    Take 1 tablet (325 mg) by mouth 2 times daily With meals        fluticasone 50 MCG/ACT spray    FLONASE     Spray 1 spray into both nostrils daily        hydrochlorothiazide 12.5 MG capsule    MICROZIDE    90 capsule    Take 1 capsule (12.5 mg) by mouth daily    Essential hypertension with goal blood pressure less than 130/80       INCRUSE ELLIPTA 62.5 MCG/INH oral inhaler   Generic drug:  umeclidinium      Inhale 1 puff into the lungs daily        lactulose 10 GM/15ML solution    CHRONULAC     Take 20 g by mouth as needed for constipation        latanoprost 0.005 % ophthalmic solution    XALATAN    2.5 mL    Place 1 drop into both eyes At Bedtime    Primary open angle glaucoma of both eyes, severe stage       levETIRAcetam 500 MG tablet    KEPPRA    180 tablet    Take 1 tablet (500 mg) by mouth 2  times daily    Nausea       lidocaine 5 % Patch    LIDODERM    30 patch    APPLY 1 TO 3 PATCHES TO PAINFUL AREA AT ONCE FOR UP TO 12 HOURS WITHIN A 24 HOUR PERIOD REMOVE AFTER 12 HOURS    Chronic pain syndrome, 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 (H)       lisinopril 20 MG tablet    PRINIVIL/ZESTRIL    90 tablet    TAKE 1 TABLET BY MOUTH DAILY DX:HYPERTENSION    History of coronary artery disease       MEDICATION GIVEN BY INTRATHECAL PUMP - INSTRUCTION      continuous 2/8/18: Pump managed by [facility] in [place] [phone].  Conc: Bupivacaine 20 mg/mL and Fentanyl 2000 mcg/mL, morphine 2 mg/mL Continuous:  Fentanyl 796 mcg/day, Bupivacaine 7.96 mg/day, Morphine 0.796 mg/day  Boluses: Up to 7 boluses per 24-hr period of Bupivicaine 0.5994 mg and Fentanyl 59.9 mcg morphine 0.0599 mg. Pump Refill Date 02/28/18        metFORMIN 500 MG 24 hr tablet    GLUCOPHAGE-XR    90 tablet    Take 1 tablet (500 mg) by mouth daily (with dinner)    Type 2 diabetes mellitus with diabetic peripheral angiopathy and gangrene, without long-term current use of insulin (H)       metoprolol succinate 50 MG 24 hr tablet    TOPROL-XL    90 tablet    TAKE 1 TABLET BY MOUTH DAILY    History of coronary artery disease       MULTIVITAMIN PO      Take 1 tablet by mouth daily        nicotine 14 MG/24HR 24 hr patch    NICODERM CQ    30 patch    Place 1 patch onto the skin daily    Tobacco dependence syndrome       nitroGLYcerin 0.4 MG sublingual tablet    NITROSTAT    25 tablet    Place 1 tablet (0.4 mg) under the tongue every 5 minutes as needed for chest pain if you are still having symptoms after 3 doses (15 minutes) call 911.    Chronic systolic congestive heart failure (H), Old myocardial infarction       ondansetron 8 MG ODT tab    ZOFRAN-ODT    30 tablet    Take 1 tablet (8 mg) by mouth every 8 hours as needed    Other migraine without status migrainosus, not intractable       order for DME      1 Month    Equipment being ordered: Depends briefs    Incontinence       order for DME     1 Device    Equipment being ordered: Power Wheelchair    CVA (cerebral infarction), HTN (hypertension)       order for DME     1 Box    Equipment being ordered: Glucerna daily shakes with each meal    Type 2 diabetes mellitus with other diabetic neurological complication       * order for DME     1 Units    Equipment being ordered: Nebulizer and tubing supplies    Simple chronic bronchitis (H)       * order for DME     1 Device    Equipment being ordered: CPAP supplies mask, hose, filters, cushion fax to [facility] at [phone] Disp: 10 DeviceRefills: prn Class: Local PrintStart: 2/10/2017    Chronic obstructive pulmonary disease, unspecified COPD type (H)       * order for DME     10 Device    Equipment being ordered: CPAP supplies mask, hose, filters, cushion  fax to [facility] at [phone]    COPD (chronic obstructive pulmonary disease) (H)       * order for DME     1 Device    Hospital bed with side rails    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 (H)       * order for DME     1 Device    Full electric hospital bed with half rails  Dx: I79336, I110, J449 Length of need: lifetime    Status post bilateral above knee amputation (H)       * order for DME     1 Device    Wheel Chair Cushion: 18 x 18 inch Roho cushion    Status post bilateral above knee amputation (H)       order for DME     1 Package    Equipment being ordered: bandage tape, prefer paper    Burn of skin       oxyCODONE IR 5 MG tablet    ROXICODONE    5 tablet    Take 1 tablet (5 mg) by mouth every 6 hours as needed for pain    Bladder spasm       pantoprazole 40 MG EC tablet    PROTONIX    30 tablet    Take 1 tablet (40 mg) by mouth daily    Gastroesophageal reflux disease without esophagitis       Phenytoin Sodium Extended 200 MG Caps     60 capsule    Take 200 mg by mouth 2 times daily    Seizure disorder (H)        polyethylene glycol Packet    MIRALAX/GLYCOLAX     Take 17 g by mouth daily Dissolved in water or juice        pregabalin 75 MG capsule    LYRICA    120 capsule    Take 2 capsules (150 mg) by mouth 2 times daily    Pain in both upper extremities       risperiDONE 0.5 MG tablet    risperDAL     Take 0.5 mg by mouth At Bedtime        sucralfate 1 GM tablet    CARAFATE    120 tablet    Take 1 tablet (1 g) by mouth 4 times daily May dissolve in 10 mL water is necessary. (Start upon completion of carafate suspension)    Adjustment disorder with depressed mood       sulfamethoxazole-trimethoprim 800-160 MG per tablet    BACTRIM DS/SEPTRA DS    14 tablet    Take 1 tablet by mouth 2 times daily    Dysuria       traZODone 50 MG tablet    DESYREL     Take 150 mg by mouth At Bedtime        vitamin D 2000 units tablet     100 tablet    Take 2,000 Units by mouth daily.        zinc 50 MG Tabs      Take 1 tablet by mouth daily        * Notice:  This list has 8 medication(s) that are the same as other medications prescribed for you. Read the directions carefully, and ask your doctor or other care provider to review them with you.

## 2018-07-27 ENCOUNTER — OFFICE VISIT (OUTPATIENT)
Dept: INTERNAL MEDICINE | Facility: CLINIC | Age: 68
End: 2018-07-27
Payer: COMMERCIAL

## 2018-07-27 VITALS
TEMPERATURE: 98.9 F | HEART RATE: 72 BPM | DIASTOLIC BLOOD PRESSURE: 80 MMHG | RESPIRATION RATE: 16 BRPM | SYSTOLIC BLOOD PRESSURE: 104 MMHG | HEIGHT: 55 IN

## 2018-07-27 DIAGNOSIS — J30.2 CHRONIC SEASONAL ALLERGIC RHINITIS, UNSPECIFIED TRIGGER: Primary | ICD-10-CM

## 2018-07-27 DIAGNOSIS — R21 RASH: Primary | ICD-10-CM

## 2018-07-27 PROCEDURE — 99213 OFFICE O/P EST LOW 20 MIN: CPT | Performed by: PHYSICIAN ASSISTANT

## 2018-07-27 RX ORDER — CETIRIZINE HYDROCHLORIDE 10 MG/1
10 TABLET ORAL EVERY EVENING
Qty: 30 TABLET | Refills: 3 | Status: SHIPPED | OUTPATIENT
Start: 2018-07-27 | End: 2020-03-04

## 2018-07-27 ASSESSMENT — PAIN SCALES - GENERAL: PAINLEVEL: NO PAIN (0)

## 2018-07-27 NOTE — TELEPHONE ENCOUNTER
Routing refill request to provider for review/approval because:  Drug not on the [facility] or [facility] refill protocol or controlled substance

## 2018-07-27 NOTE — PROGRESS NOTES
"  SUBJECTIVE:   Sonya Foote is a 68 year old female who presents to clinic today for the following health issues:      Acute Illness   Acute illness concerns: Cough/Sinus Pressure   Onset: x3 days     Fever: no     Chills/Sweats: NO    Headache (location?): YES    Sinus Pressure:NO    Conjunctivitis:  no    Ear Pain: YES- slightly     Rhinorrhea: clear runny nose    Congestion: YES    Sore Throat: YES- slightly      Cough: YES mild clear phlegm in the am     Wheeze: no     Decreased Appetite: YES    Nausea: no    Vomiting no    Diarrhea:  no     Dysuria/Freq.: no     Fatigue/Achiness: YES- both     Sick/Strep Exposure: no      Therapies Tried and outcome:   Patient was on zyrtec, appears no refills done since 5/2018 and possibly longer.   Patient using flonase nasal spray daily.   Just finished Septra DS for urinary symptoms last week     -------------------------------------    Problem list and histories reviewed & adjusted, as indicated.  Additional history: as documented    Labs reviewed in EPIC    Reviewed and updated as needed this visit by clinical staff  Tobacco  Allergies  Meds       Reviewed and updated as needed this visit by Provider  Allergies  Meds         ROS:  Constitutional, HEENT, cardiovascular, pulmonary, gi and gu systems are negative, except as otherwise noted.    OBJECTIVE:     /80 (BP Location: Left arm, Patient Position: Chair, Cuff Size: Adult Regular)  Pulse 72  Temp 98.9  F (37.2  C) (Oral)  Resp 16  Ht 3' 7\" (1.092 m)  There is no height or weight on file to calculate BMI.  GENERAL: healthy, alert and no distress  HENT: normal cephalic/atraumatic, ear canals and TM's normal, nose and mouth without ulcers or lesions, nasal mucosa edematous , rhinorrhea clear, oropharynx clear and oral mucous membranes moist  NECK: no adenopathy, no asymmetry, masses, or scars and thyroid normal to palpation  RESP: lungs clear to auscultation - no rales, rhonchi or wheezes  CV: regular rates " and rhythm and normal S1 S2, no S3 or S4  MS: no gross musculoskeletal defects noted, no edema  SKIN: no suspicious lesions or rashes    Diagnostic Test Results:  none     ASSESSMENT/PLAN:             1. Chronic seasonal allergic rhinitis, unspecified trigger    No need for antibiotics.  Recheck prn     - cetirizine (ZYRTEC) 10 MG tablet; Take 1 tablet (10 mg) by mouth every evening  Dispense: 30 tablet; Refill: 3    Patient Instructions   Continue with flonase for allergies  Addition of Zyrtec for runny nose and coughing.           Vida Sanchez PA-C  [facility]
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
## 2018-07-28 RX ORDER — CLOTRIMAZOLE 1 %
CREAM (GRAM) TOPICAL
Qty: 12 G | Refills: 0 | Status: SHIPPED | OUTPATIENT
Start: 2018-07-28 | End: 2019-04-17

## 2018-07-31 ENCOUNTER — MEDICAL CORRESPONDENCE (OUTPATIENT)
Dept: HEALTH INFORMATION MANAGEMENT | Facility: CLINIC | Age: 68
End: 2018-07-31

## 2018-07-31 DIAGNOSIS — N32.89 BLADDER SPASM: ICD-10-CM

## 2018-07-31 NOTE — TELEPHONE ENCOUNTER
Last Written Prescription Date:  06/06/18  Last Fill Quantity: 5 tablets,  # refills: 0   Last office visit: 7/27/2018 with prescribing provider:  06/25/18   Future Office Visit:    Requested Prescriptions   Pending Prescriptions Disp Refills     oxyCODONE IR (ROXICODONE) 5 MG tablet 5 tablet 0     Sig: Take 1 tablet (5 mg) by mouth every 6 hours as needed for pain    There is no refill protocol information for this order

## 2018-08-01 ENCOUNTER — PATIENT OUTREACH (OUTPATIENT)
Dept: GERIATRIC MEDICINE | Facility: CLINIC | Age: 68
End: 2018-08-01

## 2018-08-01 RX ORDER — OXYCODONE HYDROCHLORIDE 5 MG/1
5 TABLET ORAL EVERY 6 HOURS PRN
Qty: 5 TABLET | Refills: 0 | OUTPATIENT
Start: 2018-08-01
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
## 2018-08-02 NOTE — PROGRESS NOTES
8/2/18: CM received call from member - she states [employer] f/u with her and they have someone coming out today between 9-11. She states she has an appt with [facility] pain clinic this afternoon.  She states [facility] RN has been out to check on incisions and change dressings.   She will let CM know update.     CM received call back from member - chair needs new motor - will take 10 days.  Member was told they no longer have rental chairs.  Member states she will need to use manual w/c until she gets chair back.  This will be very difficult for member with cardiac function.  CM placed call to [employer] - spoke with [employer] - he states they do have rental chairs - may not be able to get exact model but do have rental - he will f/u with member.  CM placed call back to member and let her know to expect call from [employer].     CM received call back from member stating that they are bringing rental chair tomorrow - she was appreciate of CM assistance.     Jamie Sharma RN, PHN  [employer]

## 2018-08-03 ENCOUNTER — MEDICAL CORRESPONDENCE (OUTPATIENT)
Dept: HEALTH INFORMATION MANAGEMENT | Facility: CLINIC | Age: 68
End: 2018-08-03

## 2018-08-09 DIAGNOSIS — R21 RASH: Primary | ICD-10-CM

## 2018-08-09 RX ORDER — CLOTRIMAZOLE 1 %
CREAM WITH APPLICATOR VAGINAL
Qty: 12 G | Refills: 0 | Status: SHIPPED | OUTPATIENT
Start: 2018-08-09 | End: 2018-08-16

## 2018-08-09 NOTE — TELEPHONE ENCOUNTER
Requested Prescriptions   Pending Prescriptions Disp Refills     clotrimazole (LOTRIMIN) 1 % cream [Pharmacy Med Name: CLOTRIMAZOLE-7 1% VAG CR] 12 g      Sig: INSERT 1 APPLICATORFUL VAGINALLY TWICE A DAY FOR VAGINAL PAIN    There is no refill protocol information for this order        Last Written Prescription Date:  7/28/18  Last Fill Quantity: 12g,  # refills: 0   Last office visit: Department has no specialty with prescribing provider:  6/25/18   Future Office Visit:

## 2018-08-13 ENCOUNTER — OFFICE VISIT (OUTPATIENT)
Dept: PULMONOLOGY | Facility: CLINIC | Age: 68
End: 2018-08-13
Attending: INTERNAL MEDICINE
Payer: COMMERCIAL

## 2018-08-13 VITALS
HEART RATE: 88 BPM | BODY MASS INDEX: 52.47 KG/M2 | RESPIRATION RATE: 16 BRPM | SYSTOLIC BLOOD PRESSURE: 91 MMHG | WEIGHT: 138 LBS | OXYGEN SATURATION: 92 % | DIASTOLIC BLOOD PRESSURE: 60 MMHG

## 2018-08-13 DIAGNOSIS — J41.0 SIMPLE CHRONIC BRONCHITIS (H): Primary | ICD-10-CM

## 2018-08-13 PROCEDURE — G0463 HOSPITAL OUTPT CLINIC VISIT: HCPCS | Mod: ZF

## 2018-08-13 ASSESSMENT — PAIN SCALES - GENERAL: PAINLEVEL: EXTREME PAIN (8)

## 2018-08-13 NOTE — MR AVS SNAPSHOT
After Visit Summary   8/13/2018    Sonya Foote    MRN: [id number]           Patient Information     Date Of Birth          1950        Visit Information        Provider Department      8/13/2018 9:00 AM Alina Jennings MD [facility]        Today's Diagnoses     Simple chronic bronchitis (H)    -  1      Care Instructions    It was nice to see you again today.    Good luck with your move to [place].    Let me know if there is anything I can do- if you run out of refills on your lung medications, before you can get in to see a new doctor, please let me know so you don't run out of medications.    Alina Jennings           Follow-ups after your visit        Follow-up notes from your care team     Return if symptoms worsen or fail to improve.      Your next 10 appointments already scheduled     Aug 14, 2018 11:00 AM CDT   LAB with [facility]   [facility] ([facility])    [address] [address] [phone]           Please do not eat 10-12 hours before your appointment if you are coming in fasting for labs on lipids, cholesterol, or glucose (sugar). This does not apply to pregnant women. Water, hot tea and black coffee (with nothing added) are okay. Do not drink other fluids, diet soda or chew gum.              Who to contact     If you have questions or need follow up information about today's clinic visit or your schedule please contact [facility] directly at [phone].  Normal or non-critical lab and imaging results will be communicated to you by MyChart, letter or phone within 4 business days after the clinic has received the results. If you do not hear from us within 7 days, please contact the clinic through MyChart or phone. If you have a critical or abnormal lab result, we will notify you by phone as soon as possible.  Submit  refill requests through Reissued or call your pharmacy and they will forward the refill request to us. Please allow 3 business days for your refill to be completed.          Additional Information About Your Visit        Care EveryWhere ID     This is your Care EveryWhere ID. This could be used by other organizations to access your [facility] medical records  [id number]        Your Vitals Were     Pulse Respirations Pulse Oximetry BMI (Body Mass Index)          88 16 92% 52.47 kg/m2         Blood Pressure from Last 3 Encounters:   08/13/18 91/60   07/27/18 104/80   07/13/18 112/74    Weight from Last 3 Encounters:   08/13/18 62.6 kg (138 lb)   07/13/18 62.6 kg (138 lb)   07/12/18 (P) 62.6 kg (138 lb)              Today, you had the following     No orders found for display         Today's Medication Changes          These changes are accurate as of 8/13/18  9:41 AM.  If you have any questions, ask your nurse or doctor.               These medicines have changed or have updated prescriptions.        Dose/Directions    Phenytoin Sodium Extended 200 MG Caps   This may have changed:  additional instructions   Used for:  Seizure disorder (H)        Dose:  200 mg   Take 200 mg by mouth 2 times daily   Quantity:  60 capsule   Refills:  0                Primary Care Provider Office Phone # Fax #    Allan Casey [phone]       [address] [place] [id number]        Equal Access to Services     KARI CARREON : Hadii shaheen Cohen, wajohnda newton, qaybta kaalgenie encarnacion, tonie marvin . So [facility] [phone].    ATENCIÓN: Si habla español, tiene a briones disposición servicios gratuitos de asistencia lingüística. Meño al [phone].    We comply with applicable federal civil rights laws and [place] laws. We do not discriminate on the basis of race, color, national origin, age, disability, sex, sexual orientation, or gender identity.            Thank you!      Thank you for choosing [facility]  for your care. Our goal is always to provide you with excellent care. Hearing back from our patients is one way we can continue to improve our services. Please take a few minutes to complete the written survey that you may receive in the mail after your visit with us. Thank you!             Your Updated Medication List - Protect others around you: Learn how to safely use, store and throw away your medicines at www.disposemymeds.org.          This list is accurate as of 8/13/18  9:41 AM.  Always use your most recent med list.                   Brand Name Dispense Instructions for use Diagnosis    ACETAMINOPHEN PO      Take 1,000 mg by mouth every 4 hours as needed for pain Not to exceed 4000 mg/day        ADVAIR DISKUS 250-50 MCG/DOSE diskus inhaler   Generic drug:  fluticasone-salmeterol     60 Inhaler    Inhale 1 puff into the lungs 2 times daily    Acute bronchitis       * albuterol (2.5 MG/3ML) 0.083% neb solution     360 mL    INHALE 1 VIAL VIA NEBULIZER EVERY 6 HOURS AS NEEDED    Chronic obstructive pulmonary disease, unspecified COPD type (H)       * VENTOLIN  (90 Base) MCG/ACT inhaler   Generic drug:  albuterol     3 Inhaler    Inhale 2 puffs into the lungs every 6 hours as needed for shortness of breath / dyspnea or wheezing    Chronic obstructive pulmonary disease, unspecified COPD type (H)       alendronate 70 MG tablet    FOSAMAX    12 tablet    Take 1 tablet (70 mg) by mouth with 8oz water every 7 days 30 minutes before breakfast and remain upright during this time.    Age-related osteoporosis without current pathological fracture       aspirin 81 MG EC tablet     90 tablet    Take 1 tablet (81 mg) by mouth daily    Unstable angina (H)       atorvastatin 40 MG tablet    LIPITOR    90 tablet    Take 1 tablet (40 mg) by mouth daily    Hyperlipidemia LDL goal <100       blood glucose monitoring lancets     100 each    Use to test  blood sugar 3 times daily or as directed.    Type 2 diabetes mellitus with diabetic peripheral angiopathy without gangrene, without long-term current use of insulin (H)       blood glucose monitoring meter device kit     1 kit    Use to test blood sugars 3 times daily or as directed.    Type 2 diabetes, HbA1C goal < 8% (H)       blood glucose monitoring test strip    ONETOUCH ULTRA    3 Box    Use to test blood sugars 3 times daily or as directed.    Type 2 diabetes mellitus with diabetic peripheral angiopathy without gangrene, without long-term current use of insulin (H)       brimonidine 0.2 % ophthalmic solution    ALPHAGAN    1 Bottle    Place 1 drop into the right eye 2 times daily    Primary open angle glaucoma of both eyes, severe stage       calcium citrate-vitamin D 315-250 MG-UNIT Tabs per tablet    CITRACAL    120 tablet    Take 2 tablets by mouth daily    Osteoporosis       cetirizine 10 MG tablet    zyrTEC    30 tablet    Take 1 tablet (10 mg) by mouth every evening    Chronic seasonal allergic rhinitis, unspecified trigger       clotrimazole 1 % cream    LOTRIMIN    12 g    APPLY TO AFFECTED AREA TWICE DAILY FOR VAGINAL PAIN    Rash       clotrimazole 1 % cream    LOTRIMIN    12 g    INSERT 1 APPLICATORFUL VAGINALLY TWICE A DAY FOR VAGINAL PAIN    Rash       CYANOCOBALAMIN PO      Take 2,000 mcg by mouth daily        diclofenac 1 % Gel topical gel    VOLTAREN    100 g    Apply 2 g topically 4 times daily to hands    Primary osteoarthritis of both hands       dorzolamide 2 % ophthalmic solution    TRUSOPT    1 Bottle    Place 1 drop into the right eye 2 times daily    Primary open angle glaucoma of both eyes, severe stage       EPINEPHrine 0.3 MG/0.3ML injection 2-pack    EPIPEN/ADRENACLICK/or ANY BX GENERIC EQUIV    0.6 mL    Inject 0.3 mLs (0.3 mg) into the muscle as needed for anaphylaxis    Bee sting reaction, undetermined intent, subsequent encounter       escitalopram 10 MG tablet    LEXAPRO      Take 10 mg by mouth daily        ferrous sulfate 325 (65 Fe) MG tablet    IRON    60 tablet    Take 1 tablet (325 mg) by mouth 2 times daily With meals        fluticasone 50 MCG/ACT spray    FLONASE     Spray 1 spray into both nostrils daily        hydrochlorothiazide 12.5 MG capsule    MICROZIDE    90 capsule    Take 1 capsule (12.5 mg) by mouth daily    Essential hypertension with goal blood pressure less than 130/80       INCRUSE ELLIPTA 62.5 MCG/INH inhaler   Generic drug:  umeclidinium      Inhale 1 puff into the lungs daily        lactulose 10 GM/15ML solution    CHRONULAC     Take 20 g by mouth as needed for constipation        latanoprost 0.005 % ophthalmic solution    XALATAN    2.5 mL    Place 1 drop into both eyes At Bedtime    Primary open angle glaucoma of both eyes, severe stage       levETIRAcetam 500 MG tablet    KEPPRA    180 tablet    Take 1 tablet (500 mg) by mouth 2 times daily    Nausea       lidocaine 5 % Patch    LIDODERM    30 patch    APPLY 1 TO 3 PATCHES TO PAINFUL AREA AT ONCE FOR UP TO 12 HOURS WITHIN A 24 HOUR PERIOD REMOVE AFTER 12 HOURS    Chronic pain syndrome, 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 (H)       lisinopril 20 MG tablet    PRINIVIL/ZESTRIL    90 tablet    TAKE 1 TABLET BY MOUTH DAILY DX:HYPERTENSION    History of coronary artery disease       MEDICATION GIVEN BY INTRATHECAL PUMP - INSTRUCTION      continuous 2/8/18: Pump managed by [facility] in [place] [phone].  Conc: Bupivacaine 20 mg/mL and Fentanyl 2000 mcg/mL, morphine 2 mg/mL Continuous:  Fentanyl 796 mcg/day, Bupivacaine 7.96 mg/day, Morphine 0.796 mg/day  Boluses: Up to 7 boluses per 24-hr period of Bupivicaine 0.5994 mg and Fentanyl 59.9 mcg morphine 0.0599 mg. Pump Refill Date 02/28/18        metFORMIN 500 MG 24 hr tablet    GLUCOPHAGE-XR    90 tablet    Take 1 tablet (500 mg) by mouth daily (with dinner)    Type 2 diabetes mellitus with diabetic peripheral  angiopathy and gangrene, without long-term current use of insulin (H)       metoprolol succinate 50 MG 24 hr tablet    TOPROL-XL    90 tablet    TAKE 1 TABLET BY MOUTH DAILY    History of coronary artery disease       MULTIVITAMIN PO      Take 1 tablet by mouth daily        nicotine 14 MG/24HR 24 hr patch    NICODERM CQ    30 patch    Place 1 patch onto the skin daily    Tobacco dependence syndrome       nitroGLYcerin 0.4 MG sublingual tablet    NITROSTAT    25 tablet    Place 1 tablet (0.4 mg) under the tongue every 5 minutes as needed for chest pain if you are still having symptoms after 3 doses (15 minutes) call 911.    Chronic systolic congestive heart failure (H), Old myocardial infarction       ondansetron 8 MG ODT tab    ZOFRAN-ODT    30 tablet    Take 1 tablet (8 mg) by mouth every 8 hours as needed    Other migraine without status migrainosus, not intractable       order for DME     1 Month    Equipment being ordered: Depends briefs    Incontinence       order for DME     1 Device    Equipment being ordered: Power Wheelchair    CVA (cerebral infarction), HTN (hypertension)       order for DME     1 Box    Equipment being ordered: Glucerna daily shakes with each meal    Type 2 diabetes mellitus with other diabetic neurological complication       * order for DME     1 Units    Equipment being ordered: Nebulizer and tubing supplies    Simple chronic bronchitis (H)       * order for DME     1 Device    Equipment being ordered: CPAP supplies mask, hose, filters, cushion fax to [facility] at [phone] Disp: 10 DeviceRefills: prn Class: Local PrintStart: 2/10/2017    Chronic obstructive pulmonary disease, unspecified COPD type (H)       * order for DME     10 Device    Equipment being ordered: CPAP supplies mask, hose, filters, cushion  fax to [facility] at [phone]    COPD (chronic obstructive pulmonary disease) (H)       * order for DME     1 Device    Hospital bed with side rails    Status post  below knee amputation of right lower extremity (H)       * order for DME     1 Device    Full electric hospital bed with half rails  Dx: J38924, I110, J449 Length of need: lifetime    Status post bilateral above knee amputation (H)       * order for DME     1 Device    Wheel Chair Cushion: 18 x 18 inch Roho cushion    Status post bilateral above knee amputation (H)       order for DME     1 Package    Equipment being ordered: bandage tape, prefer paper    Burn of skin       oxyCODONE IR 5 MG tablet    ROXICODONE    5 tablet    Take 1 tablet (5 mg) by mouth every 6 hours as needed for pain    Bladder spasm       pantoprazole 40 MG EC tablet    PROTONIX    30 tablet    Take 1 tablet (40 mg) by mouth daily    Gastroesophageal reflux disease without esophagitis       Phenytoin Sodium Extended 200 MG Caps     60 capsule    Take 200 mg by mouth 2 times daily    Seizure disorder (H)       polyethylene glycol Packet    MIRALAX/GLYCOLAX     Take 17 g by mouth daily Dissolved in water or juice        pregabalin 75 MG capsule    LYRICA    120 capsule    Take 2 capsules (150 mg) by mouth 2 times daily    Pain in both upper extremities       risperiDONE 0.5 MG tablet    risperDAL     Take 0.5 mg by mouth At Bedtime        sucralfate 1 GM tablet    CARAFATE    120 tablet    Take 1 tablet (1 g) by mouth 4 times daily May dissolve in 10 mL water is necessary. (Start upon completion of carafate suspension)    Adjustment disorder with depressed mood       traZODone 50 MG tablet    DESYREL     Take 150 mg by mouth At Bedtime        vitamin D 2000 units tablet     100 tablet    Take 2,000 Units by mouth daily.        zinc 50 MG Tabs      Take 1 tablet by mouth daily        * Notice:  This list has 8 medication(s) that are the same as other medications prescribed for you. Read the directions carefully, and ask your doctor or other care provider to review them with you.

## 2018-08-13 NOTE — PROGRESS NOTES
Service Date: 08/13/2018      CHIEF COMPLAINT:  Chronic bronchitis.      HISTORY OF PRESENT ILLNESS:  The patient is a 58-year-old woman here for ongoing followup of chronic bronchitis.  Today, she tells me that her breathing is okay.  She is using her nebulizer on a scheduled basis.  It helps keep her cough to a minimum.  She coughs up clear thick sputum, which is occasionally tenacious are difficult to mobilize.  She also is using fluticasone/salmeterol 250/50 dose.  She reports that overall her breathing is the same.  She does occasionally feel short of breath and says that her general energy level is very low.  She will be moving to [place] in 10 days to live with her sister.  She has a CPAP that she uses with naps and at night and occasionally uses during the day with naps.  She has had multiple health issues over the last year including chronic nausea and vomiting as well as pain in her back.  She recently had a new pain pump installed and has a followup visit with the pain clinic on Thursday.  Otherwise, symptoms are unchanged since last visit.      PAST MEDICAL HISTORY:   1.  Coronary artery disease with MI in 2016.   2.  Peripheral artery disease, status post bilateral AKA.   3.  CVA x3, most recent in 2012.   4.  Heart failure with reduced ejection fraction.   5.  Legally blind.   6.  Seizure disorder.   7.  Sickle trait.   8.  Transgender.   9.  Duodenal ulcer.   10.  GERD.   11.  Chronic pain.   12.  JOSE.   13.  Hypertension.   14.  Hyperlipidemia.   15.  Diabetes.      FAMILY HISTORY:  She has a brother with schizoaffective disorder and depression.  She has a sister with diabetes and depression.  Mother had Alzheimer's and diabetes.  Father had diabetes.      SOCIAL HISTORY:  Sonya has a 83-hhzk-hxfs smoking history, quit in 2000.  She has a son who lives in [place] and a daughter in [place].  She was living in an assisted living facility.  She is moving to [place] to live with her sister.       REVIEW OF SYSTEMS:  A 10-point review of systems was done and is negative except as noted above and in the HPI.      PHYSICAL EXAMINATION:   VITAL SIGNS:  Blood pressure is 91/60, pulse is 88, respiratory rate is 16, SpO2 is 92% on room air, BMI is measured with the patient sitting in her wheelchair and is, therefore, inaccurate.   GENERAL APPEARANCE:  Seen in wheelchair, in no distress.   HEENT:  Wearing dark glasses.   RESPIRATORY:  Good air movement bilaterally.  No wheezes, rales or rhonchi.   CARDIOVASCULAR:  Regular rate and rhythm, normal S1, S2.   EXTREMITIES:  Bilateral AKA noted.      RESULTS:  Pulmonary function testing from 03/2015 shows normal spirometry, lung volumes and mildly reduced DLCO.      CT chest, PE protocol shows no visualized pulmonary embolism, no convincing evidence of active pulmonary disease.      LABORATORY:  Most recent CBC with diff shows a peripheral eosinophil count of 100.      ASSESSMENT AND PLAN:  The patient is a 68-year-old woman here for followup of chronic bronchitis.     1.  Chronic bronchitis.  This is a clinical diagnosis.  The patient has normal spirometry, but significant shortness of breath and prior tobacco use history.  She does cough up sputum on a daily basis and, therefore, qualifies for a diagnosis of chronic bronchitis.  She has been taking fluticasone/salmeterol at the 250/50 dose and also uses nebulized albuterol on a consistent basis.  Symptoms have overall been stable over the course of the last year.  She has not needed any additional changes to her medications at this time.  She reports that her refills are up-to-date and she has plenty of inhalers to last until she is able to establish care with a new pulmonary provider in [place].  I would be happy to provide her with an additional set of refills if she is unable to establish care quickly in [place].     2.  Obstructive sleep apnea, previously seen by Sleep Clinic here.  She may be able to find a  pulmonologist who also specializes in sleep disorders when she moves to [place].  She will be taking her CPAP with her.  She says that she has all of her records. Hopefully, this includes her recent polysomnogram.      We will have her follow up on an as needed basis in the future.  She is welcome to contact me with any additional questions that should arise.         POP WOOD MD             D: 2018   T: 2018   MT: al      Name:     SHARATH MERCEDES   MRN:      [id number]        Account:      [id number]   :      1950           Service Date: 2018      Document: [id number]

## 2018-08-13 NOTE — NURSING NOTE
Chief Complaint   Patient presents with     RECHECK     3 month follow up for COPD      Silvina Lim CMA

## 2018-08-13 NOTE — PATIENT INSTRUCTIONS
It was nice to see you again today.    Good luck with your move to [place].    Let me know if there is anything I can do- if you run out of refills on your lung medications, before you can get in to see a new doctor, please let me know so you don't run out of medications.    Alina Jennings

## 2018-08-14 DIAGNOSIS — G40.909 SEIZURE DISORDER (H): ICD-10-CM

## 2018-08-14 DIAGNOSIS — E78.5 HYPERLIPIDEMIA LDL GOAL <70: ICD-10-CM

## 2018-08-14 LAB
CHOLEST SERPL-MCNC: 139 MG/DL
HDLC SERPL-MCNC: 49 MG/DL
LDLC SERPL CALC-MCNC: 74 MG/DL
NONHDLC SERPL-MCNC: 90 MG/DL
PHENYTOIN SERPL-MCNC: 16.6 MG/L (ref 10–20)
TRIGL SERPL-MCNC: 82 MG/DL

## 2018-08-14 PROCEDURE — 80061 LIPID PANEL: CPT | Performed by: PSYCHIATRY & NEUROLOGY

## 2018-08-14 PROCEDURE — 36415 COLL VENOUS BLD VENIPUNCTURE: CPT | Performed by: PSYCHIATRY & NEUROLOGY

## 2018-08-14 PROCEDURE — 80185 ASSAY OF PHENYTOIN TOTAL: CPT | Performed by: PSYCHIATRY & NEUROLOGY

## 2018-08-15 ENCOUNTER — PATIENT OUTREACH (OUTPATIENT)
Dept: GERIATRIC MEDICINE | Facility: CLINIC | Age: 68
End: 2018-08-15

## 2018-08-16 DIAGNOSIS — R21 RASH: ICD-10-CM

## 2018-08-16 RX ORDER — CLOTRIMAZOLE 1 %
CREAM WITH APPLICATOR VAGINAL
Qty: 12 G | Refills: 0 | Status: ON HOLD | OUTPATIENT
Start: 2018-08-16 | End: 2020-06-11

## 2018-08-16 NOTE — PROGRESS NOTES
Care Coordinator made home visit to member as she is moving to [place] on 8/23/18 to live with her sister.  She states that she has everything ready medically - her pain clinic has referred her to pain clinic in [place].  She is also aware of clinic that she will establish with.  She has been in contact for MA in [place] and will f/u when she officially moves.  Her sister is coming and will sign out her medications for her on 8/23/18 and then they will take train to [place].  She is looking forward to moving and living with her sister.   Once move confirmed -  will complete DTR and submit information to [facility].     Jamie Sharma RN, PHN  [facility]

## 2018-08-16 NOTE — TELEPHONE ENCOUNTER
Requested Prescriptions   Pending Prescriptions Disp Refills     clotrimazole (LOTRIMIN) 1 % cream [Pharmacy Med Name: CLOTRIMAZOLE-7 1% VAG CR] 12 g      Sig: INSERT 1 APPLICATORFUL VAGINALLY TWICE A DAY FOR VAGINAL PAIN    There is no refill protocol information for this order        Last Written Prescription Date:  08/09/2018  Last Fill Quantity: 12g,  # refills: 0   Last office visit: Department has no specialty with prescribing provider:  Dr. Casey   Future Office Visit:

## 2018-08-23 ENCOUNTER — PATIENT OUTREACH (OUTPATIENT)
Dept: GERIATRIC MEDICINE | Facility: CLINIC | Age: 68
End: 2018-08-23

## 2018-08-23 ENCOUNTER — DOCUMENTATION ONLY (OUTPATIENT)
Dept: CARE COORDINATION | Facility: CLINIC | Age: 68
End: 2018-08-23
Payer: COMMERCIAL

## 2018-08-23 NOTE — PROGRESS NOTES
8/22/18: Care Coordinator spoke with member - she states that she got her chair fixed.  Her sister is here and they will take train tomorrow a.m.  They have layover in [place] and then will be to [place] on Saturday.   Care Coordinator will wait until 8/24 to ensure member did move, then will complete DTR for EW and AL services.     Jamie Sharma RN, PHN  [facility]

## 2018-08-23 NOTE — PROGRESS NOTES
8/20/18: Received vm from member that repair was not approved.  Care Coordinator call member she states it is not that it wasn't approved but rather that they have not made decision yet whether approved or denied.  She states they stated they have 14 days and have until tomorrow and will wait to make decision.       Jamie Sharma RN, PHN  [facility]

## 2018-08-23 NOTE — PROGRESS NOTES
[facility] now requests orders and shares plan of care/discharge summaries for some patients through [employer].  Please REPLY TO THIS MESSAGE OR ROUTE BACK TO THE AUTHOR in order to give authorization for orders when needed.  This is considered a verbal order, you will still receive a faxed copy of orders for signature.  Thank you for your assistance in improving collaboration for our patients.      DISCHARGE SUMMARY    Reason patient was admitted  /60 sitting, P 84, RR 20, O2 sats 95RA, T 97.8F, pain 7/10 to back, stump and legs. Lungs clear.   Patient is a 67 y/o  female living in communal facility was receiving home care for suprapubic catheter cares/ monthly changes. Patient recently had a baclofen pump placement on 7/17 with was receiving wound care and skin assessment. Wound had healed. Patient wore binder for 5 weeks. Patient recent received her controller last week. Per patient, has spasm to body and bladder daily. Reports bolus helps and she takes every three hours as needed. Patients last harrell cath changed was on 8/17/18. No complications noted. She has history of recurrent UTI, and was taking ABX on 7/17/18. She completed medications. She denies s/sx of UTI at this time. Urine is clear and yellow at this visit. She does not have securement because she reports that tape and adhesive device/supplies causes her rashes. She at times pinched or pulled catheter despite teaching in the past. She continues to need catheter management and maintenance. Home care performed monthly catheter changes and was visiting weekly for pain management. Per patient, pain is tolerable now that she has a new pump. Medication is managed by the [facility]. Patient reports compliant with meds. Patient is knowledgeable of her medication and PRN meds. AFL also managed daily cares, BS checks. Pt BS results well controlled. Today result is 130. Patient reports BG results are mostly in the 90s. Meals  prepared by building staff and are  within DM   restrictions but also mechanically ground for swallowing difficulties. Patient takes Zofran for nausea and stool softener for constipation. LMB was yesterday. Skin intact. Pt mood has been good this cert period despite surgrey for the baclofen pump. Per pt, she is excited that she is moving to [place] tomorrow to live with her sister and family. Her sister is here with her and will be her main caregiver.     PMH includes recurrent pyelonephritis, DM, systolic CHF, dysphagia, intestinal malabsorption, HTN, aortic stenosis, angina, anemia, OA, sleep apnea, asthma, Hx seizure disorder, schizoaffective disorder, CAD, GERD, Hx MI, bilateral AKA, depression, anxiety, sexual reassignment MTF, COPD, neuropathy, PAD, bilateral lower extremity amputations, HTN history of CVA    Summary of outcomes in meeting goals// no ER visit, no unplanned hospitalization, no falls. Patient continues to need teaching on harrell cath care and mainteince and hydration.     Discharge disposition//good    Final education provided   Educated patient to find a PCP right away when she gets to [place] to establish care and get orders for catheter change, supplies, and home care services. Per verb understanding and had already found a pain clinic and will find a PCP asap when she gets there. Per pt, her harrell bag still leaks a little. Edu to change gauze and provide skin care when soiled. Patient verb understanding. Patient was given tape and supplies to last her for another 2 more weeks. Patient has 2 harrell bags. Educated patient and sister to buy bags online if needed. Cg and patient verb understanding.

## 2018-08-23 NOTE — PROGRESS NOTES
8/21/18: Care Coordinator received call from member stating that repair was approved.  She is going to [employer] tomorrow to return rental chair and they will fix her current chair. She is hoping everything goes well as she is moving - taking train on Thurs.      Jamie Sharma RN, N  [employer]

## 2018-08-27 ENCOUNTER — PATIENT OUTREACH (OUTPATIENT)
Dept: GERIATRIC MEDICINE | Facility: CLINIC | Age: 68
End: 2018-08-27

## 2018-08-27 DIAGNOSIS — Z76.89 HEALTH CARE HOME: Chronic | ICD-10-CM

## 2018-08-27 NOTE — PROGRESS NOTES
Dr. Casey    I am the [facility] care coordinator for Sonya Foote, and I am writing to notify you of a change in services.   EW services - Assisted Living have been terminated.     This change has occurred because member moved out of state to [place].     I am required by the health plan to notify you of this change. No action is required on your part. Please do not hesitate to contact me with any questions or concerns. Thank you.    Jamie Sharma RN, PHN  [facility]

## 2018-08-27 NOTE — PROGRESS NOTES
[facility] Care Coordination Contact  Received a request to submit a DTR for the termination of assisted liviing, wipes and elderly waiver. Documentation completed and faxed to the health plan.  aware.    Dayna Kelly RN  Utilization   [facility]  [phone]

## 2018-08-27 NOTE — PROGRESS NOTES
Care Coordinator spoke with member - she states she made it to [place] yesterday and confirmed permanent stay.  She and her sister are working on getting signed up with MA in [place] and establishing with PCP/specialists.       Will complete DTR process for EW and AL services - sent to Dayna Kelly.     PCP notified.   Providers aware of DTR and move.  [phone] sent to [facility].     Jamie Sharma RN, N  [facility]

## 2018-12-11 ENCOUNTER — PATIENT OUTREACH (OUTPATIENT)
Dept: GERIATRIC MEDICINE | Facility: CLINIC | Age: 68
End: 2018-12-11

## 2018-12-12 NOTE — PROGRESS NOTES
Member moved out of state 8/2018.   MA is inactive and Care Coordinator spoke with member and verified she lives in [place].  [employer] coverage will end 12/31/18.    EW was closed 8/2018 when member moved.   Unable to enter refusal in MMIS due to MA inactive.    No letter will be sent as member lives in [place] and is aware.     Jamie Sharma RN, PHN  [employer]

## 2019-01-07 ENCOUNTER — PATIENT OUTREACH (OUTPATIENT)
Dept: GERIATRIC MEDICINE | Facility: CLINIC | Age: 69
End: 2019-01-07

## 2019-01-07 NOTE — PROGRESS NOTES
[facility] Care Coordination Contact    No longer active with [facility] community case management effective 12/31/18.  Reason for community disenrollment: MA Term - member moved out of state.     Disenrollment checklist complete and sent to CMS to complete.     Jamie Sharma RN, PHN  [facility]

## 2019-01-19 NOTE — PLAN OF CARE
Problem: Patient Care Overview  Goal: Plan of Care/Patient Progress Review  Outcome: No Change  Pt's wheelchair and belongings bag arrived from PACU. Pt reports that she has sleep apnea and uses CPAP at home. Resp rate drops to 7 briefly at times. No narcotics given. Resp rate stable when pt awake. Pt wearing Capnography overnight. Sats stable on 2L. Will continue to monitor pt.       
Problem: Patient Care Overview  Goal: Plan of Care/Patient Progress Review  Pt a/o x 4 overnight. VSS except respiratory rate drops at times due to sleep apnea. Pt states she wears cpap at night with O2. If pt does not discharge will obtain cpap for the next night stay. Sats and capnography remain stable overnight on 2L NC. RA trial this am. When pt is awake resp rate 16, down to 8 when sleeping. No narcs given overnight. SP cath site intact with scant drainage. Good urine output. Pt has kendell aka and her own wheelchair in room. Will continue to monitor pt.       
No

## 2019-04-17 ENCOUNTER — OFFICE VISIT (OUTPATIENT)
Dept: INTERNAL MEDICINE | Facility: CLINIC | Age: 69
End: 2019-04-17
Payer: COMMERCIAL

## 2019-04-17 VITALS
SYSTOLIC BLOOD PRESSURE: 138 MMHG | WEIGHT: 115 LBS | TEMPERATURE: 98.5 F | RESPIRATION RATE: 15 BRPM | DIASTOLIC BLOOD PRESSURE: 78 MMHG | BODY MASS INDEX: 26.61 KG/M2 | HEIGHT: 55 IN | OXYGEN SATURATION: 97 % | HEART RATE: 63 BPM

## 2019-04-17 DIAGNOSIS — Z89.511 STATUS POST BELOW KNEE AMPUTATION OF RIGHT LOWER EXTREMITY (H): ICD-10-CM

## 2019-04-17 DIAGNOSIS — Z13.89 SCREENING FOR DIABETIC PERIPHERAL NEUROPATHY: ICD-10-CM

## 2019-04-17 DIAGNOSIS — R35.0 URINARY FREQUENCY: Primary | ICD-10-CM

## 2019-04-17 DIAGNOSIS — I50.22 CHRONIC SYSTOLIC CONGESTIVE HEART FAILURE (H): ICD-10-CM

## 2019-04-17 DIAGNOSIS — E78.5 HYPERLIPIDEMIA LDL GOAL <70: ICD-10-CM

## 2019-04-17 DIAGNOSIS — G62.9 PERIPHERAL POLYNEUROPATHY: Chronic | ICD-10-CM

## 2019-04-17 DIAGNOSIS — G40.909 SEIZURE DISORDER (H): ICD-10-CM

## 2019-04-17 DIAGNOSIS — G89.4 CHRONIC PAIN SYNDROME: ICD-10-CM

## 2019-04-17 DIAGNOSIS — R82.90 NONSPECIFIC FINDING ON EXAMINATION OF URINE: ICD-10-CM

## 2019-04-17 PROBLEM — T18.108A ESOPHAGEAL FOREIGN BODY: Status: RESOLVED | Noted: 2017-11-27 | Resolved: 2019-04-17

## 2019-04-17 PROBLEM — K92.1 BLACK TARRY STOOLS: Status: RESOLVED | Noted: 2018-02-08 | Resolved: 2019-04-17

## 2019-04-17 PROBLEM — R11.2 NAUSEA & VOMITING: Status: RESOLVED | Noted: 2017-12-06 | Resolved: 2019-04-17

## 2019-04-17 PROBLEM — N39.0 UTI (URINARY TRACT INFECTION): Status: RESOLVED | Noted: 2017-10-08 | Resolved: 2019-04-17

## 2019-04-17 LAB
ALBUMIN UR-MCNC: NEGATIVE MG/DL
ANION GAP SERPL CALCULATED.3IONS-SCNC: 6 MMOL/L (ref 3–14)
APPEARANCE UR: ABNORMAL
BACTERIA #/AREA URNS HPF: ABNORMAL /HPF
BILIRUB UR QL STRIP: NEGATIVE
BUN SERPL-MCNC: 12 MG/DL (ref 7–30)
CALCIUM SERPL-MCNC: 9.1 MG/DL (ref 8.5–10.1)
CHLORIDE SERPL-SCNC: 105 MMOL/L (ref 94–109)
CO2 SERPL-SCNC: 27 MMOL/L (ref 20–32)
COLOR UR AUTO: YELLOW
CREAT SERPL-MCNC: 0.7 MG/DL (ref 0.52–1.04)
GFR SERPL CREATININE-BSD FRML MDRD: 88 ML/MIN/{1.73_M2}
GLUCOSE SERPL-MCNC: 80 MG/DL (ref 70–99)
GLUCOSE UR STRIP-MCNC: NEGATIVE MG/DL
HBA1C MFR BLD: 5 % (ref 0–5.6)
HGB UR QL STRIP: ABNORMAL
KETONES UR STRIP-MCNC: NEGATIVE MG/DL
LEUKOCYTE ESTERASE UR QL STRIP: ABNORMAL
NITRATE UR QL: POSITIVE
PH UR STRIP: 7 PH (ref 5–7)
POTASSIUM SERPL-SCNC: 4.1 MMOL/L (ref 3.4–5.3)
RBC #/AREA URNS AUTO: ABNORMAL /HPF
SODIUM SERPL-SCNC: 138 MMOL/L (ref 133–144)
SOURCE: ABNORMAL
SP GR UR STRIP: 1.01 (ref 1–1.03)
TSH SERPL DL<=0.005 MIU/L-ACNC: 1.52 MU/L (ref 0.4–4)
UROBILINOGEN UR STRIP-ACNC: 0.2 EU/DL (ref 0.2–1)
WBC #/AREA URNS AUTO: ABNORMAL /HPF

## 2019-04-17 PROCEDURE — 83036 HEMOGLOBIN GLYCOSYLATED A1C: CPT | Performed by: INTERNAL MEDICINE

## 2019-04-17 PROCEDURE — 81001 URINALYSIS AUTO W/SCOPE: CPT | Performed by: INTERNAL MEDICINE

## 2019-04-17 PROCEDURE — 99207 C FOOT EXAM  NO CHARGE: CPT | Mod: 25 | Performed by: INTERNAL MEDICINE

## 2019-04-17 PROCEDURE — 82043 UR ALBUMIN QUANTITATIVE: CPT | Performed by: INTERNAL MEDICINE

## 2019-04-17 PROCEDURE — 36415 COLL VENOUS BLD VENIPUNCTURE: CPT | Performed by: INTERNAL MEDICINE

## 2019-04-17 PROCEDURE — 80048 BASIC METABOLIC PNL TOTAL CA: CPT | Performed by: INTERNAL MEDICINE

## 2019-04-17 PROCEDURE — 87088 URINE BACTERIA CULTURE: CPT | Performed by: INTERNAL MEDICINE

## 2019-04-17 PROCEDURE — 87186 SC STD MICRODIL/AGAR DIL: CPT | Performed by: INTERNAL MEDICINE

## 2019-04-17 PROCEDURE — 84443 ASSAY THYROID STIM HORMONE: CPT | Performed by: INTERNAL MEDICINE

## 2019-04-17 PROCEDURE — 87086 URINE CULTURE/COLONY COUNT: CPT | Performed by: INTERNAL MEDICINE

## 2019-04-17 PROCEDURE — 99214 OFFICE O/P EST MOD 30 MIN: CPT | Performed by: INTERNAL MEDICINE

## 2019-04-17 RX ORDER — SOLIFENACIN SUCCINATE 10 MG/1
10 TABLET, FILM COATED ORAL DAILY
COMMUNITY
End: 2019-04-17

## 2019-04-17 RX ORDER — SOLIFENACIN SUCCINATE 10 MG/1
10 TABLET, FILM COATED ORAL DAILY
Qty: 90 TABLET | Refills: 3 | Status: SHIPPED | OUTPATIENT
Start: 2019-04-17 | End: 2019-06-05

## 2019-04-17 RX ORDER — CIPROFLOXACIN 250 MG/1
250 TABLET, FILM COATED ORAL 2 TIMES DAILY
Qty: 14 TABLET | Refills: 0 | Status: SHIPPED | OUTPATIENT
Start: 2019-04-17 | End: 2019-04-25

## 2019-04-17 ASSESSMENT — MIFFLIN-ST. JEOR: SCORE: 693.27

## 2019-04-17 NOTE — PROGRESS NOTES
ciprof    SUBJECTIVE:   Sonya Foote is a 69 year old female who presents to clinic today for the following   health issues:    Patient has been a long-standing patient of mine which has apparently moved to [place] to be with family and now was apparently back to reestablish care.    Patient states she was not receiving good care while in [place]. Her motorized wheel chair broke 6+ months ago and her pain pump was not covered. Patient returned to [place] on Saturday. Patient currently staying in hotel room and is requesting assistance with getting into a care facility. Requesting new RX for pain medication and refills on [facility].    Patient states that he was in [place] last weekend and on Monday received she received a phone call but says she has a urinary tract infection but apparently they did not treated.  She reports the below symptoms.      URINARY TRACT SYMPTOMS      Duration: 2 weeks     Description  Dysuria, order, blood in urine     Intensity:  severe    Accompanying signs and symptoms:  Fever/chills: YES- chills   Flank pain YES  Nausea and vomiting: no   Vaginal symptoms: discharge  Abdominal/Pelvic Pain: YES    History  History of frequent UTI's: YES  History of kidney stones: YES  Sexually Active: no   Possibility of pregnancy: No    Precipitating or alleviating factors: Patient states she was treated for infection while in [place] (was in the hospital 4-5+ times for multiple issues)    Therapies tried and outcome: Increased fluids     Patient states that she has had a constellation of mishaps in regards to her care while she was in [place].  She apparently has not been getting her chronic pain medications through her pump which she had prior to leaving here.  She is to be followed in the clinic and [facility] but is really been off of any narcotics for 6 months.  She has bilateral lower extremity amputations due to peripheral vascular disease and is wheelchair-bound and functionally has not had her motorized  wheelchair which apparently she could not get fixed and her brother-in-law did not want the wheelchair in the house where she was living in [place].    She apparently now has no place to live and is living in a hotel.    Additional history: as documented    Reviewed  and updated as needed this visit by clinical staff         Reviewed and updated as needed this visit by Provider         Patient Active Problem List   Diagnosis     Seizure disorder (H)     ACP (advance care planning)     Osteoporosis     Schizoaffective disorder (H)     AS (sickle cell trait) (H)     Vertigo     Person who has had sex change operation     Claudication in peripheral vascular disease (H)     Intestinal malabsorption     GIB (gastrointestinal bleeding)     Cervicalgia     Asthma     Adjustment disorder with depressed mood     Chronic pain syndrome     Open-angle glaucoma     Hx of colonic polyp     Iron deficiency anemia     Late effect of stroke     Degeneration of intervertebral disc of lumbosacral region     Thoracic or lumbosacral neuritis or radiculitis     Cerebral infarction due to occlusion or stenosis of carotid artery     Disorder of bone and cartilage     Hereditary and idiopathic peripheral neuropathy     Androgen insensitivity syndrome     PAD (peripheral artery disease) (H)     Chronic systolic congestive heart failure (H)     Stenosis of carotid artery     Osteoarthritis     Pain in both upper extremities     Atherosclerotic peripheral vascular disease with rest pain (H)     Essential hypertension     Angina pectoris, crescendo (H)     Type 2 diabetes mellitus with diabetic peripheral angiopathy without gangrene, without long-term current use of insulin (H)     Anemia, unspecified type     Critical lower limb ischemia     Testicular feminization     Anxiety disorder due to general medical condition     Central retinal artery occlusion     Lumbosacral radiculitis     Peripheral neuropathy     Status post below knee amputation  of right lower extremity (H)     Primary open angle glaucoma of both eyes, severe stage     Pseudophakia of right eye     Cataract, left eye     Diabetes mellitus type 2 without retinopathy (H)     Simple chronic bronchitis (H)     JOSE (obstructive sleep apnea)     Complex sleep apnea syndrome     Coronary artery disease of native artery of native heart with stable angina pectoris (H)     Ischemic cardiomyopathy     Bee sting reaction, undetermined intent, subsequent encounter     Functional incontinence     Personal history of urinary tract infection     Dysphagia     Essential hypertension with goal blood pressure less than 130/80     Hyperlipidemia LDL goal <70     Food impaction of esophagus     Incontinence     Other migraine without status migrainosus, not intractable     Morbid obesity (H)     CVA, old, hemiparesis (H)     Tobacco abuse     Past Surgical History:   Procedure Laterality Date     AMPUTATE LEG ABOVE KNEE Left 6/11/2016    Procedure: AMPUTATE LEG ABOVE KNEE;  Surgeon: Mello Rodriguez MD;  [facility]     AMPUTATE LEG BELOW KNEE Right 11/7/2016    Procedure: AMPUTATE LEG BELOW KNEE;  Surgeon: Savannah Durant MD;  [facility]     AMPUTATE REVISION STUMP LOWER EXTREMITY Right 11/11/2016    Procedure: AMPUTATE REVISION STUMP LOWER EXTREMITY;  Surgeon: Savannah Durant MD;  [facility]     AMPUTATE REVISION STUMP LOWER EXTREMITY Right 11/16/2016    Procedure: AMPUTATE REVISION STUMP LOWER EXTREMITY;  Surgeon: Savannah Durant MD;  [facility]     AMPUTATE TOE(S) Right 1/5/2016    Procedure: AMPUTATE TOE(S);  Surgeon: Mello Gaines DPM;  [facility]: [facility]     ANGIOGRAM Bilateral 11/21/2014    Procedure: ANGIOGRAM;  Surgeon: Savannah Durant MD;  [facility]     ANGIOGRAM Left 1/16/2015    Procedure: ANGIOGRAM;  Surgeon: Savannah Durant MD;  [facility]     ANGIOGRAM Bilateral 9/14/2015    Procedure: ANGIOGRAM;  Surgeon: Savannah Durant MD;  [facility]     ANGIOGRAM Left 10/12/2015     Procedure: ANGIOGRAM;  Surgeon: Savannah Durant MD;  [facility]     ANGIOGRAM Right 6/6/2016    Procedure: ANGIOGRAM;  Surgeon: Savannah Durant MD;  [facility]     ANGIOPLASTY Right 6/6/2016    Procedure: ANGIOPLASTY;  Surgeon: Savannah Durant MD;  [facility]     APPENDECTOMY       BREAST SURGERY      right breast bx (benign)     CATARACT IOL, RT/LT Right      CHOLECYSTECTOMY       COLONOSCOPY N/A 8/25/2014    Procedure: COLONOSCOPY;  Surgeon: Mello Ferrer MD;  [facility]     COLONOSCOPY WITH CO2 INSUFFLATION N/A 8/20/2014    Procedure: COLONOSCOPY WITH CO2 INSUFFLATION;  Surgeon: Duane, William Charles, MD;  [facility]     CYSTOSTOMY, INSERT TUBE SUPRAPUBIC, COMBINED N/A 1/16/2018    Procedure: COMBINED CYSTOSTOMY, INSERT TUBE SUPRAPUBIC;  Cystoscopy, Intraoperative Ultrasound, Suprapubic Tube Placement;  Surgeon: Keanu Dawson MD;  [facility]     ENDARTERECTOMY FEMORAL  5/23/2014    Procedure: ENDARTERECTOMY FEMORAL;  Surgeon: Jason Joshi MD;  [facility]     ESOPHAGOSCOPY, GASTROSCOPY, DUODENOSCOPY (EGD), COMBINED  12/14/2012    Procedure: COMBINED ESOPHAGOSCOPY, GASTROSCOPY, DUODENOSCOPY (EGD), BIOPSY SINGLE OR MULTIPLE;  ESOPHAGOSCOPY, GASTROSCOPY, DUODENOSCOPY (EGD), DILATATION ;  Surgeon: Elizabeth Stevenson MD;  Location:  GI     ESOPHAGOSCOPY, GASTROSCOPY, DUODENOSCOPY (EGD), COMBINED  12/31/2013    Procedure: COMBINED ESOPHAGOSCOPY, GASTROSCOPY, DUODENOSCOPY (EGD), BIOPSY SINGLE OR MULTIPLE;;  Surgeon: Clemente Lopez MD;  Location:  GI     ESOPHAGOSCOPY, GASTROSCOPY, DUODENOSCOPY (EGD), COMBINED  4/1/2014    Procedure: COMBINED ESOPHAGOSCOPY, GASTROSCOPY, DUODENOSCOPY (EGD);;  Surgeon: Clemente Lopez MD;  Location:  GI     ESOPHAGOSCOPY, GASTROSCOPY, DUODENOSCOPY (EGD), COMBINED  6/28/2014    Procedure: COMBINED ESOPHAGOSCOPY, GASTROSCOPY, DUODENOSCOPY (EGD);  Surgeon: Clemente Lopez MD;  [facility]     ESOPHAGOSCOPY, GASTROSCOPY,  DUODENOSCOPY (EGD), COMBINED N/A 8/20/2014    Procedure: COMBINED ESOPHAGOSCOPY, GASTROSCOPY, DUODENOSCOPY (EGD), BIOPSY SINGLE OR MULTIPLE;  Surgeon: Duane, William Charles, MD;  Location:  OR     ESOPHAGOSCOPY, GASTROSCOPY, DUODENOSCOPY (EGD), COMBINED N/A 8/22/2014    Procedure: COMBINED ESOPHAGOSCOPY, GASTROSCOPY, DUODENOSCOPY (EGD), BIOPSY SINGLE OR MULTIPLE;  Surgeon: Mello Ferrer MD;  [facility]     ESOPHAGOSCOPY, GASTROSCOPY, DUODENOSCOPY (EGD), COMBINED N/A 10/2/2014    Procedure: COMBINED ESOPHAGOSCOPY, GASTROSCOPY, DUODENOSCOPY (EGD), BIOPSY SINGLE OR MULTIPLE;  Surgeon: Remy Haskins MD;  Location:  GI     ESOPHAGOSCOPY, GASTROSCOPY, DUODENOSCOPY (EGD), COMBINED Left 12/15/2014    Procedure: COMBINED ESOPHAGOSCOPY, GASTROSCOPY, DUODENOSCOPY (EGD), BIOPSY SINGLE OR MULTIPLE;  Surgeon: Remy Haskins MD;  [facility]     ESOPHAGOSCOPY, GASTROSCOPY, DUODENOSCOPY (EGD), COMBINED N/A 2/25/2015    Procedure: COMBINED ENDOSCOPIC ULTRASOUND, ESOPHAGOSCOPY, GASTROSCOPY, DUODENOSCOPY (EGD), FINE NEEDLE ASPIRATE/BIOPSY;  Surgeon: Clemente Lugo MD;  Location:  GI     ESOPHAGOSCOPY, GASTROSCOPY, DUODENOSCOPY (EGD), COMBINED Left 2/25/2015    Procedure: COMBINED ESOPHAGOSCOPY, GASTROSCOPY, DUODENOSCOPY (EGD), BIOPSY SINGLE OR MULTIPLE;  Surgeon: Clemente Lugo MD;  Location:  GI     ESOPHAGOSCOPY, GASTROSCOPY, DUODENOSCOPY (EGD), COMBINED N/A 9/25/2016    Procedure: COMBINED ESOPHAGOSCOPY, GASTROSCOPY, DUODENOSCOPY (EGD);  Surgeon: Aziza Patiño MD;  Location:  GI     ESOPHAGOSCOPY, GASTROSCOPY, DUODENOSCOPY (EGD), COMBINED N/A 1/18/2017    Procedure: COMBINED ESOPHAGOSCOPY, GASTROSCOPY, DUODENOSCOPY (EGD), BIOPSY SINGLE OR MULTIPLE;  Surgeon: Clemente Lopez MD;  Location:  GI     ESOPHAGOSCOPY, GASTROSCOPY, DUODENOSCOPY (EGD), COMBINED N/A 11/26/2017    Procedure: COMBINED ESOPHAGOSCOPY, GASTROSCOPY, DUODENOSCOPY (EGD), REMOVE FOREIGN BODY;   Esophagogastroduodenoscopy with foreign body extraction  ;  Surgeon: Herberth Castrejon MD;  [facility]     ESOPHAGOSCOPY, GASTROSCOPY, DUODENOSCOPY (EGD), COMBINED N/A 2017    Procedure: COMBINED ESOPHAGOSCOPY, GASTROSCOPY, DUODENOSCOPY (EGD), REMOVE FOREIGN BODY;;  Surgeon: Herberth Castreojn MD;  [facility]     FASCIOTOMY LOWER EXTREMITY Left 6/10/2016    Procedure: FASCIOTOMY LOWER EXTREMITY;  Surgeon: Mello Rodriguez MD;  [facility]     HC CAPSULE ENDOSCOPY N/A 2014    Procedure: CAPSULE/PILL CAM ENDOSCOPY;  Surgeon: Remy Haskins MD;  [facility]     HC CAPSULE ENDOSCOPY N/A 10/2/2014    Procedure: CAPSULE/PILL CAM ENDOSCOPY;  Surgeon: Remy Haskins MD;  [facility]     ORTHOPEDIC SURGERY      broken wrist repair     SEX TRANSFORMATION SURGERY, MALE TO FEMALE      1974     SINUS SURGERY      cyst removed     TONSILLECTOMY       VASCULAR SURGERY      Left carotid stent       Social History     Tobacco Use     Smoking status: Current Every Day Smoker     Packs/day: 0.25     Years: 30.00     Pack years: 7.50     Types: Cigarettes, Cigars     Last attempt to quit: 2001     Years since quittin.4     Smokeless tobacco: Never Used   Substance Use Topics     Alcohol use: No     Alcohol/week: 0.0 oz     Family History   Problem Relation Age of Onset     Dementia Mother      Glaucoma Mother      Diabetes Mother         may have been type 1, childhood     Coronary Artery Disease Mother         MI     Glaucoma Father      Diabetes Father         may hev been type 1 - ??     Heart Failure Father      Cancer Maternal Aunt         leukemia     Schizophrenia Brother      Depression Brother      Suicide Sister      Depression Sister      Diabetes Sister      Cancer Maternal Aunt         ovarian     Glaucoma Maternal Grandmother      Diabetes Maternal Grandmother      Glaucoma Maternal Grandfather      Diabetes Maternal Grandfather      Glaucoma Paternal  Grandmother      Diabetes Paternal Grandmother      Glaucoma Paternal Grandfather      Diabetes Paternal Grandfather      Breast Cancer Sister      Cerebrovascular Disease Brother      Colon Cancer No family hx of      Macular Degeneration No family hx of          Current Outpatient Medications   Medication Sig Dispense Refill     ACETAMINOPHEN PO Take 1,000 mg by mouth every 4 hours as needed for pain Not to exceed 4000 mg/day       ADVAIR DISKUS 250-50 MCG/DOSE diskus inhaler Inhale 1 puff into the lungs 2 times daily 60 Inhaler 11     albuterol (2.5 MG/3ML) 0.083% neb solution INHALE 1 VIAL VIA NEBULIZER EVERY 6 HOURS AS NEEDED 360 mL 11     alendronate (FOSAMAX) 70 MG tablet Take 1 tablet (70 mg) by mouth with 8oz water every 7 days 30 minutes before breakfast and remain upright during this time. 12 tablet 3     aspirin EC 81 MG EC tablet Take 1 tablet (81 mg) by mouth daily 90 tablet 3     atorvastatin (LIPITOR) 40 MG tablet Take 1 tablet (40 mg) by mouth daily 90 tablet 2     blood glucose monitoring (ONE TOUCH ULTRA 2) meter device kit Use to test blood sugars 3 times daily or as directed. 1 kit 0     blood glucose monitoring (ONE TOUCH ULTRA) test strip Use to test blood sugars 3 times daily or as directed. 3 Box 3     blood glucose monitoring (ONE TOUCH ULTRASOFT) lancets Use to test blood sugar 3 times daily or as directed. 100 each PRN     brimonidine (ALPHAGAN) 0.2 % ophthalmic solution Place 1 drop into the right eye 2 times daily 1 Bottle 11     calcium citrate-vitamin D (CITRACAL) 315-250 MG-UNIT TABS Take 2 tablets by mouth daily 120 tablet 5     cetirizine (ZYRTEC) 10 MG tablet Take 1 tablet (10 mg) by mouth every evening 30 tablet 3     Cholecalciferol (VITAMIN D) 2000 UNITS tablet Take 2,000 Units by mouth daily. 100 tablet 3     clotrimazole (LOTRIMIN) 1 % cream INSERT 1 APPLICATORFUL VAGINALLY TWICE A DAY FOR VAGINAL PAIN 12 g 0     clotrimazole (LOTRIMIN) 1 % cream APPLY TO AFFECTED AREA TWICE  DAILY FOR VAGINAL PAIN 12 g 0     CYANOCOBALAMIN PO Take 2,000 mcg by mouth daily       diclofenac (VOLTAREN) 1 % GEL topical gel Apply 2 g topically 4 times daily to hands 100 g 11     dorzolamide (TRUSOPT) 2 % ophthalmic solution Place 1 drop into the right eye 2 times daily 1 Bottle 11     EPINEPHrine (EPIPEN/ADRENACLICK/OR ANY BX GENERIC EQUIV) 0.3 MG/0.3ML injection 2-pack Inject 0.3 mLs (0.3 mg) into the muscle as needed for anaphylaxis 0.6 mL 1     escitalopram (LEXAPRO) 10 MG tablet Take 10 mg by mouth daily        ferrous sulfate (IRON) 325 (65 FE) MG tablet Take 1 tablet (325 mg) by mouth 2 times daily With meals 60 tablet 2     fluticasone (FLONASE) 50 MCG/ACT nasal spray Spray 1 spray into both nostrils daily       hydrochlorothiazide (MICROZIDE) 12.5 MG capsule Take 1 capsule (12.5 mg) by mouth daily 90 capsule 2     lactulose (CHRONULAC) 10 GM/15ML solution Take 20 g by mouth as needed for constipation       latanoprost (XALATAN) 0.005 % ophthalmic solution Place 1 drop into both eyes At Bedtime 2.5 mL 11     levETIRAcetam (KEPPRA) 500 MG tablet Take 1 tablet (500 mg) by mouth 2 times daily 180 tablet 1     lidocaine (LIDODERM) 5 % Patch APPLY 1 TO 3 PATCHES TO PAINFUL AREA AT ONCE FOR UP TO 12 HOURS WITHIN A 24 HOUR PERIOD REMOVE AFTER 12 HOURS 30 patch 5     lisinopril (PRINIVIL/ZESTRIL) 20 MG tablet TAKE 1 TABLET BY MOUTH DAILY DX:HYPERTENSION 90 tablet 3     MEDICATION GIVEN BY INTRATHECAL PUMP - INSTRUCTION continuous 2/8/18: Pump managed by [facility] in [place] [phone].   Conc: Bupivacaine 20 mg/mL and Fentanyl 2000 mcg/mL, morphine 2 mg/mL  Continuous:  Fentanyl 796 mcg/day, Bupivacaine 7.96 mg/day, Morphine 0.796 mg/day   Boluses: Up to 7 boluses per 24-hr period of Bupivicaine 0.5994 mg and Fentanyl 59.9 mcg morphine 0.0599 mg.  Pump Refill Date 02/28/18       metFORMIN (GLUCOPHAGE-XR) 500 MG 24 hr tablet Take 1 tablet (500 mg) by mouth daily (with dinner) 90  tablet 3     metoprolol succinate (TOPROL-XL) 50 MG 24 hr tablet TAKE 1 TABLET BY MOUTH DAILY 90 tablet 3     Multiple Vitamin (MULTIVITAMIN OR) Take 1 tablet by mouth daily        nicotine (NICODERM CQ) 14 MG/24HR 24 hr patch Place 1 patch onto the skin daily 30 patch 0     nitroglycerin (NITROSTAT) 0.4 MG SL tablet Place 1 tablet (0.4 mg) under the tongue every 5 minutes as needed for chest pain if you are still having symptoms after 3 doses (15 minutes) call 911. 25 tablet 1     ondansetron (ZOFRAN-ODT) 8 MG ODT tab Take 1 tablet (8 mg) by mouth every 8 hours as needed (Patient not taking: Reported on 8/13/2018) 30 tablet 5     order for DME Equipment being ordered: bandage tape, prefer paper 1 Package 0     order for DME Full electric hospital bed with half rails    Dx: Q81544, I110, J449  Length of need: lifetime 1 Device 0     order for DME Wheel Chair Cushion: 18 x 18 inch Roho cushion 1 Device 0     order for DME Hospital bed with side rails 1 Device 0     order for DME Equipment being ordered: CPAP supplies mask, hose, filters, cushion    fax to [facility] at [phone] 10 Device prn     order for DME Equipment being ordered: CPAP supplies mask, hose, filters, cushion fax to [facility] at [phone]  Disp: 10 Device Refills: prn   Class: Local Print Start: 2/10/2017 1 Device 0     order for DME Equipment being ordered: Nebulizer and tubing supplies 1 Units 0     order for DME Equipment being ordered: Glucerna daily shakes with each meal 1 Box 11     ORDER FOR DME Equipment being ordered: Power Wheelchair 1 Device 0     ORDER FOR DME Equipment being ordered: Depends briefs 1 Month 11     oxyCODONE IR (ROXICODONE) 5 MG tablet Take 1 tablet (5 mg) by mouth every 6 hours as needed for pain 5 tablet 0     pantoprazole (PROTONIX) 40 MG EC tablet Take 1 tablet (40 mg) by mouth daily 30 tablet 5     phenytoin 200 MG CAPS Take 200 mg by mouth 2 times daily (Patient taking differently: Take 200 mg by  mouth 2 times daily (takes at 8 AM and 8 PM)) 60 capsule 0     polyethylene glycol (MIRALAX/GLYCOLAX) Packet Take 17 g by mouth daily Dissolved in water or juice        pregabalin (LYRICA) 75 MG capsule Take 2 capsules (150 mg) by mouth 2 times daily 120 capsule 5     risperiDONE (RISPERDAL) 0.5 MG tablet Take 0.5 mg by mouth At Bedtime       sucralfate (CARAFATE) 1 GM tablet Take 1 tablet (1 g) by mouth 4 times daily May dissolve in 10 mL water is necessary. (Start upon completion of carafate suspension) 120 tablet 11     traZODone (DESYREL) 50 MG tablet Take 150 mg by mouth At Bedtime        umeclidinium (INCRUSE ELLIPTA) 62.5 MCG/INH oral inhaler Inhale 1 puff into the lungs daily       VENTOLIN  (90 BASE) MCG/ACT Inhaler Inhale 2 puffs into the lungs every 6 hours as needed for shortness of breath / dyspnea or wheezing 3 Inhaler 5     zinc 50 MG TABS Take 1 tablet by mouth daily       Allergies   Allergen Reactions     Bee Venom      Penicillins Anaphylaxis     Other reaction(s): Skin Rash and/or Hives     Dilantin [Phenytoin] Other (See Comments)     Generic dilantin only per pt     Iodide Hives     09/11/15: Pt states she can use iodine and doesn't have any problems      Iodine-131      Novocaine [Procaine] Hives     Other reaction(s): Skin Rash and/or Hives     Tositumomab      Recent Labs   Lab Test 08/14/18  1102 07/04/18  0732 06/06/18  1149 05/31/18  0206  05/02/18  0800 05/02/18  0330  02/08/18  0843  08/24/17  0955  11/04/16  1145  06/06/16  0648  07/22/14  1209   A1C  --   --  5.1  --   --  4.4  --   --   --   --  4.5   < >  --   --  4.9   < >  --    LDL 74  --  103*  --   --   --   --   --   --   --   --   --   --   --  77   < > 104   HDL 49*  --  53  --   --   --   --   --   --   --   --   --   --   --  65   < > 50*   TRIG 82  --  130  --   --   --   --   --   --   --   --   --   --   --  62   < > 124   ALT  --  66*  --   --   --   --  44  --  60*   < >  --    < >  --    < >  --    < > 51*  "  CR  --  0.59  --  0.52   < >  --  0.52   < > 0.43*   < >  --    < > 0.60   < >  --    < > 0.67   GFRESTIMATED  --  >90  --  >90   < >  --  >90   < > >90   < >  --    < > >90  Non  GFR Calc     < >  --    < > 88   GFRESTBLACK  --  >90  --  >90   < >  --  >90   < > >90   < >  --    < > >90   GFR Calc     < >  --    < > >90   POTASSIUM  --  3.5  --  3.5   < >  --  2.9*   < > 4.0   < >  --    < > 4.1   < >  --    < > 3.6   TSH  --   --   --   --   --   --   --   --   --   --   --   --  0.84  --   --   --  1.68    < > = values in this interval not displayed.      BP Readings from Last 3 Encounters:   08/13/18 91/60   07/27/18 104/80   07/13/18 112/74    Wt Readings from Last 3 Encounters:   08/13/18 62.6 kg (138 lb)   07/13/18 62.6 kg (138 lb)   07/12/18 (P) 62.6 kg (138 lb)              Labs reviewed in EPIC    ROS:  CONSTITUTIONAL: NEGATIVE for fever, chills, change in weight  ENT/MOUTH: NEGATIVE for ear, mouth and throat problems  RESP: NEGATIVE for significant cough or SOB  CV: NEGATIVE for chest pain, palpitations or peripheral edema  GI: NEGATIVE for nausea, abdominal pain, heartburn, or change in bowel habits  She reports burning with urination, blood in her urine and \"stringy\" urine appearance  MUSCULOSKELETAL: NEGATIVE for significant arthralgias or myalgia  HEME: NEGATIVE for bleeding problems  PSYCHIATRIC: NEGATIVE for changes in mood or affect    OBJECTIVE:                                                    /78   Pulse 63   Temp 98.5  F (36.9  C) (Oral)   Resp 15   Ht 1.092 m (3' 7\")   Wt 52.2 kg (115 lb)   SpO2 97%   BMI 43.73 kg/m    There is no height or weight on file to calculate BMI.  GENERAL:  alert and no distress in wheelchair with bilateral LE amputations  EYES: Eyes grossly normal to inspection, extraocular movements - intact, and PERRL  HENT: ear canals- normal; TMs- normal; Nose- normal; Mouth- no ulcers, no lesions  NECK: no tenderness, no " adenopathy, no asymmetry, no masses, no stiffness; thyroid- normal to palpation  RESP: lungs clear to auscultation - no rales, no rhonchi, no wheezes  CV: regular rates and rhythm, normal S1 S2, no S3 or S4 and no click or rub   MS: extremities- no gross deformities noted less issues of bilateral lower extremity amputations  NEURO:  No focal changes  PSYCH: Alert and oriented times 3; speech- coherent , normal rate and volume; able to articulate logical thoughts, able to abstract reason, no tangential thoughts, no hallucinations or delusions, affect- normal       ASSESSMENT/PLAN:                                                      (R35.0) Urinary frequency  (primary encounter diagnosis)  Comment: We will start Cipro empirically based on prior culture results here and obtain a urinalysis today also.  Plan: ciprofloxacin (CIPRO) 250 MG tablet, *UA reflex        to Microscopic and Culture ([place] [facility] (except [place] and [place]),         solifenacin (VESICARE) 10 MG tablet        She has tolerated Cipro in the past with resolution of her symptoms    (Z13.89) Screening for diabetic peripheral neuropathy  Comment: Advise routine lower extremity stump care  Plan: FOOT EXAM  NO CHARGE [85274.114]            (G89.4) Chronic pain syndrome  Comment: I do not think it is necessarily bad idea that she is not on any narcotics although she feels that she is in quite a significant amount of pain thus I am reluctant to start any narcotics until she is seen and reestablished at a pain clinic to determine what treatment options of the best for her.  Continuing with nonnarcotic analgesic care.  Plan: [facility], CARE         COORDINATION REFERRAL            (I50.22) Chronic systolic congestive heart failure (H)  Comment: She appears euvolemic at present time and I am going to continue with current medical therapy without change.  Plan: BASIC METABOLIC PANEL, HEMOGLOBIN A1C,  Albumin         Random Urine Quantitative with Creat Ratio, TSH        WITH FREE T4 REFLEX, CARE COORDINATION REFERRAL        I have asked for care coordination to help with the patient's psychosocial issues.    (E78.5) Hyperlipidemia LDL goal <70  Comment: Labs ordered as fasting per routine when due 8/2109  Plan:   LDL Cholesterol Calculated   Date Value Ref Range Status   08/14/2018 74 <100 mg/dL Final     Comment:     Desirable:       <100 mg/dl       (G62.9) Peripheral polyneuropathy  Comment: Stable suspect contributing to mild degree of stump pain  Plan: CARE COORDINATION REFERRAL            (G40.909) Seizure disorder (H)  Comment: Patient states that she will follow-up with Dr. Orourke in the neurology clinic as scheduled  Plan: CARE COORDINATION REFERRAL            (Z89.511) 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 (H)  Comment: She was given a new prescription for a powered wheelchair.  Plan: order for DME          See Patient Instructions I have asked her to see if she can obtain the recent urinalysis and urine culture results that were done in [place] as well as any old records that she had while she was gone from us.    lAlan Casey MD  [facility]

## 2019-04-17 NOTE — LETTER
[facility]  [address] [address]  [phone]      4/18/2019       Sonya Foote  [address] [place] [id number]        Dear Sonya,    Your basic panel is normal.    Your Hemoglobin A1C and blood sugar tests look good and I would continue with your medication without change.  These tests should be repeated in 6 months.    Your thyroid function tests look good and thus I would not change anything at this point.    Your urine sample did suggest a bladder infection and I am awaiting for the culture results to return so please take the antibiotics as we discussed.  Sincerely,      Allan Casey MD  Internal Medicine

## 2019-04-18 ENCOUNTER — PATIENT OUTREACH (OUTPATIENT)
Dept: CARE COORDINATION | Facility: CLINIC | Age: 69
End: 2019-04-18

## 2019-04-18 LAB
CREAT UR-MCNC: 19 MG/DL
MICROALBUMIN UR-MCNC: 68 MG/L
MICROALBUMIN/CREAT UR: 357.07 MG/G CR (ref 0–25)

## 2019-04-18 NOTE — PROGRESS NOTES
Clinic Care Coordination Contact  Care Team Conversations    TAMARA BAUER spoke with Laura at [facility] to discuss pt's eligibility to live in facility short-term. She explained that she would need the most recent physical information, health hx, medication list, and demographics. Once this has been reviewed she will determine if insurance will cover the stay. She will then require information to be filled out by the PCP.    Plan: TAMARA BAUER will contact pt for approval to send this information to [facility].    NAOMI Allan, [facility]  Clinic Care Coordinator  [facility]  [phone]  [email or web address]

## 2019-04-18 NOTE — PROGRESS NOTES
Clinic Care Coordination Contact    Clinic Care Coordination Contact  OUTREACH    Referral Information:  Referral Source: PCP    Primary Diagnosis: Psychosocial    Chief Complaint   Patient presents with     Clinic Care Coordination - Initial     Clinical Concerns:  CC JUANCARLOS informed pt of [facility]'s needs to determine her eligibility, including PCP notes, health history, medication, and demographics. Pt stated approval for TAMARA BAUER to fax this to [facility].    Plan: TAMARA BAUER will fax information to [facility] and determine next steps.    NAOMI Allan, [facility]  Clinic Care Coordinator  [facility]  [phone]  [email or web address]

## 2019-04-18 NOTE — PROGRESS NOTES
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 Though her primary concern is impending homelessness. She is staying at an [facility] until Saturday when she will run out of money.     Functional Status:  Pt reported that she is currently using a manual wheel chair which is very difficult. She is waiting to use the prescription she received yesterday for a power-wheel chair until she gets settled.    Living Situation:  Pt is currently homeless staying at [facility] in [place]. She is hoping to move into a TCU until she can move somewhere longterm.     Psychosocial:  Pt is diagnosed with schizoaffective d/o. She is currently most concerned about finding a place to live after Saturday. She has been in contact with [facility], her previous Assisted Living, to discuss moving back. She is awaiting the Community [facility] to assist in this, she is unsure of the programs but it sounds as if she is waiting for an Elderly Waiver to provide funding for [facility].    Financial/Insurance:   Pt currently has Medicare, which she had in [place], and is awaiting MA (application completed on Monday).     Resources and Interventions:  Community Resources: [facility]     Equipment Currently Used at Home: wheelchair, manual    Advance Care Plan/Directive  Advanced Care Plans/Directives on file:: Yes  Type Advanced Care Plans/Directives: DNR/DNI  Advanced Care Plan/Directive Status: Not Applicable    Referrals Placed: [facility]     Goals:   Goals        General    Psychosocial (pt-stated)     Notes - Note created  4/18/2019 12:09 PM by Grazyna Padgett LGSW    Goal Statement: Within the next 1-2 months, I want to move back into my  previous home at [place] [facility] in [place] for my overall wellbeing.     Measure of Success: Pt will contact [facility] and follow their instructions to apply for MA and Elderly waiver. Pt will verbally inform CC SW of success.    Supportive Steps to Achieve: CC SW provided [facility] Front Door number to start process. CC SW will provide ongoing assistance and follow up to determine next steps to succeed in goal    Barriers: Potential barriers to time in conversing with the [facility]    Strengths: Pt is very motivated to secure services to increase independence, recognition of need for assistance, support system    Date to Achieve By: 6/18/2019    Patient expressed understanding of goal: Pt verbally stated understanding of goal          Patient/Caregiver understanding: Pt verbally stated understanding    Outreach Frequency: weekly  Future Appointments              In 2 months Guanaco Kowalski MD [facility], [facility] SLEEP        Plan: CC SW will contact Medicare to discuss Respite Care options. Pt was reminded of CC SW contact information and encouraged to call with questions of concerns that arise before next outreach.    NAOMI Allan, LGSW  Clinic Care Coordinator  [facility] [place]  [phone]  [email or web address]

## 2019-04-18 NOTE — PROGRESS NOTES
Clinic Care Coordination Contact  Care Team Conversations    CC SW contact pt's medicare representative. She explained that pt's there are no in-network facilities in [place]. On May 1st, she will have a PPO plan which will assist in some coverage of facilities. Medicare will cover her powered-wheel chair.    Plan: TAMARA BAUER will outreach to pt to discuss other housing options.    NAOMI Allan, [facility]  Clinic Care Coordinator  [facility] [place]  [phone]  [email or web address]

## 2019-04-18 NOTE — PROGRESS NOTES
Clinic Care Coordination Contact  Care Team Conversations    TAMARA BAUER spoke with Laura at [facility] and confirmed she received fax. She will be reviewing fax and contacting clinic if pt is approved for admission. If so, she will be faxing an order form that will need to be filled out by PCP.    Plan: TAMARA BAUER will follow up with pt and [facility] tomorrow with further information.     NAOMI Allan, [facility]  Clinic Care Coordinator  [facility]  [phone]  [email or web address]

## 2019-04-18 NOTE — PROGRESS NOTES
Clinic Care Coordination Contact    Clinic Care Coordination Contact  OUTREACH    Referral Information:  Referral Source: PCP    Primary Diagnosis: Psychosocial    Chief Complaint   Patient presents with     Clinic Care Coordination - Initial     Financial/Insurance:   Pt stated that she does now have Medical Assistance, [employer]. She would like to go into a nursing home until she can move somewhere more permanent. Specifically, pt asked about [facility] in [place].     Plan: TAMARA BAUER will contact [facility] to discuss the possibility of pt going to facililty and will contact pt with outcome.     NAOMI Allan, [facility]  Clinic Care Coordinator  [facility]  [phone]  [email or web address]

## 2019-04-18 NOTE — PROGRESS NOTES
Clinic Care Coordination Contact  Care Team Conversations    [facility] spoke with Laura at [facility] who confirmed they have a shared female room available for patient.  Admission orders faxed to [facility] for PCP to complete and sign.    [facility] RN facilitated admission orders to be completed and signed by PCP Dr. Casey and faxed back to [facility].  [facility] RN left voicemail for Laura updating that orders had been faxed back and requested call back to [facility] confirming fax receipt.    [facility] RN updated patient of admission orders sent to [facility].  Plan for patient admission on Saturday 4/20/19.    Prashanth Yancey, RN  Clinic Care Coordinator  [facility]  Ph: [phone]

## 2019-04-18 NOTE — PROGRESS NOTES
Clinic Care Coordination Contact    Clinic Care Coordination Contact  OUTREACH    Referral Information:  Referral Source: PCP    Primary Diagnosis: Psychosocial    Chief Complaint   Patient presents with     Clinic Care Coordination - Initial     Clinical Concerns:  CC SW explained that she currently doesn't have insurance that will cover respite or TCU care. CC SW encouraged her to contact [facility] to check the status of her MA application which was turned in on Monday. CC SW assisted pt connect to the [facility] as she was unable to write number down due to poor eyesight.    Goals:   Goals        General    Psychosocial (pt-stated)     Notes - Note created  4/18/2019 12:09 PM by Grazyna Padgett LGSW    Goal Statement: Within the next 1-2 months, I want to move back into my previous home at [facility] in [place] for my overall wellbeing.     Measure of Success: Pt will contact [facility] and follow their instructions to apply for MA and Elderly waiver. Pt will verbally inform CC SW of success.    Supportive Steps to Achieve: CC SW provided [facility] Front Door number to start process. CC SW will provide ongoing assistance and follow up to determine next steps to succeed in goal    Barriers: Potential barriers to time in conversing with the [facility]    Strengths: Pt is very motivated to secure services to increase independence, recognition of need for assistance, support system    Date to Achieve By: 6/18/2019    Patient expressed understanding of goal: Pt verbally stated understanding of goal          Patient/Caregiver understanding: Pt verbally stated understanding    Plan: Pt reports understanding of CC consult and denies any concerns.  CC SW will outreach to pt again later today. Pt has SW CC contact information should any questions arise prior to next outreach.     NAOMI Allan, LGSW  Clinic Care Coordinator  [facility]  [place]  [phone]  [email or web address]

## 2019-04-19 ENCOUNTER — PATIENT OUTREACH (OUTPATIENT)
Dept: CARE COORDINATION | Facility: CLINIC | Age: 69
End: 2019-04-19

## 2019-04-19 ASSESSMENT — ACTIVITIES OF DAILY LIVING (ADL): DEPENDENT_IADLS:: CLEANING;COOKING;LAUNDRY;SHOPPING;MEAL PREPARATION;TRANSPORTATION

## 2019-04-19 NOTE — PROGRESS NOTES
Clinic Care Coordination Contact  Care Team Conversations    CC SW spoke with Alecia in admissions department at [facility] to confirm all paperwork was received for patient to move in tomorrow. All paperwork has been completed and received, pt is all set to move in tomorrow.    NAOMI Allan, [facility]  Clinic Care Coordinator  [facility] [place]  [phone]  [email or web address]

## 2019-04-19 NOTE — PROGRESS NOTES
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    Pt understands that she will be moving into [facility] tomorrow afternoon. Her son will be assisting her with transportation there.    Financial/Insurance:   Pt inquired about her Medical Assistance. She have the number but not the card. CC SW explained that she does not need to the card to access MA services, including transportation. She also wondered if she could get a  through the [facility] as she had previously. CC JUANCARLOS explained that she would likely need a waiver, which she would also need for half-way. It is unclear if the waiver assessment have been completed yet. CC SW and pt will look into this next week to ensure process has begun.    Goals:   Goals        General    Psychosocial (pt-stated)     Notes - Note created  4/18/2019 12:09 PM by Grazyna Padgett LGSW    Goal Statement: Within the next 1-2 months, I want to move back into my previous home at [facility] in [place] for my overall wellbeing.     Measure of Success: Pt will contact [facility] and follow their instructions to apply for MA and Elderly waiver. Pt will verbally inform CC JUANCARLOS of success.    Supportive Steps to Achieve: CC SW provided [facility] Front Door number to start process. CC SW will provide ongoing assistance and follow up to determine next steps to succeed in goal    Barriers: Potential barriers to time in conversing with the [facility]    Strengths: Pt is very motivated to secure services to increase independence, recognition of need for assistance, support system    Date to Achieve By: 6/18/2019    Patient expressed understanding of  goal: Pt verbally stated understanding of goal          Patient/Caregiver understanding: Pt verbally stated understanding    Outreach Frequency: weekly  Future Appointments              In 2 months Guanaco Kowalski MD [facility],  SLEEP        Plan: CC SW will follow up in 1 week per pt request to determine additional steps to obtaining a waiver through MA. Pt was reminded of CC SW contact information and encouraged to call with questions of concerns that arise before next outreach.    NAOMI Allan, [facility]  Clinic Care Coordinator  [facility]  [phone]  [email or web address]

## 2019-04-20 LAB
BACTERIA SPEC CULT: ABNORMAL
SPECIMEN SOURCE: ABNORMAL

## 2019-04-22 ENCOUNTER — TELEPHONE (OUTPATIENT)
Dept: INTERNAL MEDICINE | Facility: CLINIC | Age: 69
End: 2019-04-22

## 2019-04-22 ENCOUNTER — PATIENT OUTREACH (OUTPATIENT)
Dept: CARE COORDINATION | Facility: CLINIC | Age: 69
End: 2019-04-22

## 2019-04-22 ASSESSMENT — ACTIVITIES OF DAILY LIVING (ADL): DEPENDENT_IADLS:: CLEANING;COOKING;LAUNDRY;SHOPPING;MEAL PREPARATION;TRANSPORTATION

## 2019-04-22 NOTE — PROGRESS NOTES
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 Though her primary concern is getting back to her [facility] that she was residing prior to moving to [place].    Functional Status:  Dependent ADLs:: Wheelchair-independent  Dependent IADLs:: Cleaning, Cooking, Laundry, Shopping, Meal Preparation, Transportation  Bed or wheelchair confined:: Yes  Mobility Status: Independent w/Device  Fallen 2 or more times in the past year?: No  Any fall with injury in the past year?: No    Living Situation:  Pt has moved into [facility]. She reports that she is comfortable there and plans to live there until her Elderly Waiver is approved and she can move to her [facility].    Diet/Exercise/Sleep:  Pt reported concerns for her C-PAP. She stated that someone called her regarding getting a new one but she didn't not have a permanent address. CC SW assisted pt to call her  Sleep Clinic. They reported that pt has an appointment on June 25 at 2pm to assess pt for C-PAP and replace at that time.    Transportation:  Son and medical transportation     Psychosocial:  Pt has a son living in the [facility] that is supportive     Financial/Insurance:   Pt has [employer] through MA     Resources and Interventions:  Current Resources: Pt is awaited an Elderly Waiver through [facility]     Goals:   Goals        General    Psychosocial (pt-stated)     Notes - Note created  4/18/2019 12:09 PM by Grazyna Padgett JUANCARLOS    Goal Statement: Within the next 1-2 months, I want to move back into my previous home at [facility] in [place] for my overall wellbeing.     Measure of Success: Pt will contact [facility] and follow their  instructions to apply for MA and Elderly waiver. Pt will verbally inform CC SW of success.    Supportive Steps to Achieve: CC SW provided [facility] Front Door number to start process. CC SW will provide ongoing assistance and follow up to determine next steps to succeed in goal    Barriers: Potential barriers to time in conversing with the [facility]    Strengths: Pt is very motivated to secure services to increase independence, recognition of need for assistance, support system    Date to Achieve By: 6/18/2019    Patient expressed understanding of goal: Pt verbally stated understanding of goal          Patient/Caregiver understanding: Pt verbally stated understanding    Outreach Frequency: weekly  Future Appointments              In 2 months Guanaco Kowalski MD [facility],  SLEEP        Plan: CC SW will follow up in 1 week per pt request to discuss additional needs at that time. Pt was reminded of CC SW contact information and encouraged to call with questions of concerns that arise before next outreach.    NAOMI Allan, LGSW  Clinic Care Coordinator  [facility] [place]  [phone]  [email or web address]

## 2019-04-22 NOTE — TELEPHONE ENCOUNTER
Reason for Call: Request for an order or referral:    Order or referral being requested: Power wheel chair    Date needed: as soon as possible    Has the patient been seen by the PCP for this problem? YES    Additional comments: Please fax order to [employer] at fax # [phone]    Phone number Patient can be reached at:  Other phone number:  [phone]  Best Time:  8am-5pm    Can we leave a detailed message on this number?  YES    Call taken on 4/22/2019 at 10:14 AM by DUKE FOSTER

## 2019-04-23 ENCOUNTER — NURSING HOME VISIT (OUTPATIENT)
Dept: GERIATRICS | Facility: CLINIC | Age: 69
End: 2019-04-23
Payer: COMMERCIAL

## 2019-04-23 VITALS
BODY MASS INDEX: 52.47 KG/M2 | WEIGHT: 138 LBS | DIASTOLIC BLOOD PRESSURE: 86 MMHG | SYSTOLIC BLOOD PRESSURE: 141 MMHG | TEMPERATURE: 98 F | RESPIRATION RATE: 18 BRPM | HEART RATE: 82 BPM | OXYGEN SATURATION: 97 %

## 2019-04-23 DIAGNOSIS — N32.81 OVERACTIVE BLADDER: ICD-10-CM

## 2019-04-23 DIAGNOSIS — I25.5 ISCHEMIC CARDIOMYOPATHY: ICD-10-CM

## 2019-04-23 DIAGNOSIS — G47.33 OSA (OBSTRUCTIVE SLEEP APNEA): ICD-10-CM

## 2019-04-23 DIAGNOSIS — I73.9 PAD (PERIPHERAL ARTERY DISEASE) (H): ICD-10-CM
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
## 2019-04-23 DIAGNOSIS — I69.359 CVA, OLD, HEMIPARESIS (H): ICD-10-CM

## 2019-04-23 DIAGNOSIS — Z72.0 TOBACCO ABUSE: ICD-10-CM

## 2019-04-23 DIAGNOSIS — G89.4 CHRONIC PAIN SYNDROME: ICD-10-CM

## 2019-04-23 DIAGNOSIS — G62.9 PERIPHERAL POLYNEUROPATHY: Chronic | ICD-10-CM

## 2019-04-23 DIAGNOSIS — I10 ESSENTIAL HYPERTENSION: ICD-10-CM

## 2019-04-23 DIAGNOSIS — J41.0 SIMPLE CHRONIC BRONCHITIS (H): ICD-10-CM

## 2019-04-23 DIAGNOSIS — G40.909 SEIZURE DISORDER (H): ICD-10-CM

## 2019-04-23 DIAGNOSIS — I25.118 CORONARY ARTERY DISEASE OF NATIVE ARTERY OF NATIVE HEART WITH STABLE ANGINA PECTORIS (H): ICD-10-CM

## 2019-04-23 PROCEDURE — 99310 SBSQ NF CARE HIGH MDM 45: CPT | Performed by: NURSE PRACTITIONER

## 2019-04-23 RX ORDER — PREGABALIN 75 MG/1
150 CAPSULE ORAL 2 TIMES DAILY
Qty: 120 CAPSULE | Refills: 5 | Status: SHIPPED | OUTPATIENT
Start: 2019-04-23 | End: 2019-05-23 | Stop reason: ALTCHOICE

## 2019-04-23 NOTE — LETTER
4/23/2019        RE: Sonya Foote  [address]  [facility] [address]        [facility]  PRIMARY CARE PROVIDER AND CLINIC:  Dr. Allan Casey, [facility]  Chief Complaint   Patient presents with     custodial Acute     [place] Medical Record Number:  [id number]  Place of Service where encounter took place:  [facility] [[id number]]    Sonya Foote  is a 69 year old  (1950), [facility] the [facility] facility [facility]  Admitted to this facility for  medical management and nursing care.    HPI:    HPI information obtained from: facility chart records, facility staff, patient report and [facility] chart review.     This is a 69-year old female, with a past medical history significant for peripheral artery disease s/p left above the knee amputation on 6/11/16, right above the knee amputation 11/16/16, CVA x 3, seizure disorder, chronic bronchitis, JOSE, tobacco abuse, type 2 diabetes mellitus, CAD with COLLEEN x 2 to prox-to-mid RCA on 9/17/16, chronic dysphagia with esophageal strictures s/p dilatation 9/6/16, chronic pain with previous intrathecal pain pump, schizoaffective disorder, depression, anxiety, transgender and Sickle trait, who was admitted to [facility] by her PCP who she recently re-established care with after living in [place] from August 2018 to April 2019. Patient is seeking short-term placement until her Elder Waiver can be put back in place so she can return to her previous residence, [facility].     Seen by PCP on 4/17/19 to re-establish care. 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 Ciprofloxacin ordered x 7 days.     Updates on Status Since Skilled nursing Admission:     Complains of pain. Needs to re-establish care with [facility]. Gets cards out of wallet for medical providers she was followed by prior to moving to [place]. Needs to get a new power wheelchair as her other  wheelchair broke. Reports she fell 2 times while living with her sister in [place]. Had to quit smoking while living with her sister, but has now started smoking again after returning to [place]. Was not happy with the healthcare she received in [place]. Is looking forward to seeing her grandchildren. Would like her son to be contacted first in the event of an emergency.     CODE STATUS/ADVANCE DIRECTIVES DISCUSSION:   [facility] - DNR/DNI [facility] Paperwork - DNR/DNI - Discussion today - Patient initially says FULL CODE, but later says she would not want her ribs broken during CPR.   Patient's living condition: Living in [facility] in [facility] temporarily. Previously lived with sister in [place] since August 2018  ALLERGIES: Bee venom; Penicillins; Dilantin [phenytoin]; Iodide; Iodine-131; Novocaine [procaine]; and Tositumomab  PAST MEDICAL HISTORY:  has a past medical history of Anemia, Antiplatelet or antithrombotic long-term use, Arthritis, AS (sickle cell trait) (H) (10/8/2013), Blind left eye, BPPV (benign paroxysmal positional vertigo), Chronic back pain, COPD (chronic obstructive pulmonary disease) (H), Coronary artery disease, CVA (cerebral infarction) (10/8/2012), Diastolic CHF (H) (9/4/2012), Dilantin toxicity (5/31/2013), Esophageal candidiasis (H), GERD (gastroesophageal reflux disease), Heart attack (H), Hernia, abdominal, History of blood transfusion, History of thrombophlebitis, HTN (hypertension) (9/4/2012), Hyperlipidemia LDL goal <100 (9/4/2012), Legally blind in right eye, as defined in [place], Osteoporosis (1/21/2013), Other chronic pain, PAD (peripheral artery disease) (H), Palpitations, Person who has had sex change operation (1/20/2014), Pneumonia, Schizoaffective disorder (H), Seizure disorder (H) (9/4/2012), Sleep apnea, Thrombosis of leg, Type 2 diabetes, HbA1C goal < 8% (H) (9/4/2012), Uncomplicated asthma, and Unspecified cerebral artery occlusion with cerebral infarction.  She also has no past medical history of Asymptomatic human immunodeficiency virus (HIV) infection status (H), Cerebral palsy (H), Congenital renal agenesis and dysgenesis, Difficult intubation, Goiter, Gout, History of spinal cord injury, Horseshoe kidney, Hydrocephalus, Malignant hyperthermia, Mumps, Parkinsons disease (H), Spider veins, Spina bifida (H), STD (sexually transmitted disease), Tethered cord (H), or Tuberculosis.  PAST SURGICAL HISTORY:   has a past surgical history that includes appendectomy; tonsillectomy; Cholecystectomy; orthopedic surgery; Breast surgery; sinus surgery; Esophagoscopy, gastroscopy, duodenoscopy (EGD), combined (12/14/2012); vascular surgery; Esophagoscopy, gastroscopy, duodenoscopy (EGD), combined (12/31/2013); Sex transformation, male to female; Esophagoscopy, gastroscopy, duodenoscopy (EGD), combined (4/1/2014); Endarterectomy femoral (5/23/2014); Esophagoscopy, gastroscopy, duodenoscopy (EGD), combined (6/28/2014); Colonoscopy with CO2 insufflation (N/A, 8/20/2014); Esophagoscopy, gastroscopy, duodenoscopy (EGD), combined (N/A, 8/20/2014); Esophagoscopy, gastroscopy, duodenoscopy (EGD), combined (N/A, 8/22/2014); CAPSULE ENDOSCOPY (N/A, 8/25/2014); Colonoscopy (N/A, 8/25/2014); CAPSULE ENDOSCOPY (N/A, 10/2/2014); Esophagoscopy, gastroscopy, duodenoscopy (EGD), combined (N/A, 10/2/2014); Angiogram (Bilateral, 11/21/2014); Esophagoscopy, gastroscopy, duodenoscopy (EGD), combined (Left, 12/15/2014); Angiogram (Left, 1/16/2015); Esophagoscopy, gastroscopy, duodenoscopy (EGD), combined (N/A, 2/25/2015); Esophagoscopy, gastroscopy, duodenoscopy (EGD), combined (Left, 2/25/2015); Angiogram (Bilateral, 9/14/2015); Angiogram (Left, 10/12/2015); Amputate toe(s) (Right, 1/5/2016); Amputate leg above knee (Left, 6/11/2016); Fasciotomy lower extremity (Left, 6/10/2016); Angiogram (Right, 6/6/2016); Angioplasty (Right, 6/6/2016); Esophagoscopy, gastroscopy, duodenoscopy (EGD), combined (N/A,  9/25/2016); Amputate leg below knee (Right, 11/7/2016); Amputate revision stump lower extremity (Right, 11/11/2016); Amputate revision stump lower extremity (Right, 11/16/2016); Esophagoscopy, gastroscopy, duodenoscopy (EGD), combined (N/A, 1/18/2017); Esophagoscopy, gastroscopy, duodenoscopy (EGD), combined (N/A, 11/26/2017); Esophagoscopy, gastroscopy, duodenoscopy (EGD), combined (N/A, 11/26/2017); Cystostomy, insert tube suprapubic, combined (N/A, 1/16/2018); and cataract iol, rt/lt (Right).  FAMILY HISTORY: family history includes Breast Cancer in her sister; Cancer in her maternal aunt and maternal aunt; Cerebrovascular Disease in her brother; Coronary Artery Disease in her mother; Dementia in her mother; Depression in her brother and sister; Diabetes in her father, maternal grandfather, maternal grandmother, mother, paternal grandfather, paternal grandmother, and sister; Glaucoma in her father, maternal grandfather, maternal grandmother, mother, paternal grandfather, and paternal grandmother; Heart Failure in her father; Schizophrenia in her brother; Suicide in her sister.  SOCIAL HISTORY:   reports that she has been smoking cigarettes and cigars.  She has a 7.50 pack-year smoking history. She has never used smokeless tobacco. She reports that she does not drink alcohol or use drugs.    Post Discharge Medication Reconciliation Status: discharge medications reconciled, continue medications without change    Current Outpatient Medications   Medication Sig Dispense Refill     ACETAMINOPHEN PO Take 1,000 mg by mouth every 4 hours as needed for pain Not to exceed 4000 mg/day       ADVAIR DISKUS 250-50 MCG/DOSE diskus inhaler Inhale 1 puff into the lungs 2 times daily 60 Inhaler 11     albuterol (2.5 MG/3ML) 0.083% neb solution INHALE 1 VIAL VIA NEBULIZER EVERY 6 HOURS AS NEEDED 360 mL 11     alendronate (FOSAMAX) 70 MG tablet Take 1 tablet (70 mg) by mouth with 8oz water every 7 days 30 minutes before breakfast  and remain upright during this time. 12 tablet 3     aspirin EC 81 MG EC tablet Take 1 tablet (81 mg) by mouth daily 90 tablet 3     atorvastatin (LIPITOR) 40 MG tablet Take 1 tablet (40 mg) by mouth daily 90 tablet 2     blood glucose monitoring (ONE TOUCH ULTRA 2) meter device kit Use to test blood sugars 3 times daily or as directed. 1 kit 0     blood glucose monitoring (ONE TOUCH ULTRA) test strip Use to test blood sugars 3 times daily or as directed. 3 Box 3     blood glucose monitoring (ONE TOUCH ULTRASOFT) lancets Use to test blood sugar 3 times daily or as directed. 100 each PRN     brimonidine (ALPHAGAN) 0.2 % ophthalmic solution Place 1 drop into the right eye 2 times daily 1 Bottle 11     calcium citrate-vitamin D (CITRACAL) 315-250 MG-UNIT TABS Take 2 tablets by mouth daily 120 tablet 5     cetirizine (ZYRTEC) 10 MG tablet Take 1 tablet (10 mg) by mouth every evening 30 tablet 3     Cholecalciferol (VITAMIN D) 2000 UNITS tablet Take 2,000 Units by mouth daily. 100 tablet 3     CYANOCOBALAMIN PO Take 2,000 mcg by mouth daily       dorzolamide (TRUSOPT) 2 % ophthalmic solution Place 1 drop into the right eye 2 times daily 1 Bottle 11     EPINEPHrine (EPIPEN/ADRENACLICK/OR ANY BX GENERIC EQUIV) 0.3 MG/0.3ML injection 2-pack Inject 0.3 mLs (0.3 mg) into the muscle as needed for anaphylaxis 0.6 mL 1     escitalopram (LEXAPRO) 10 MG tablet Take 10 mg by mouth daily        ferrous sulfate (IRON) 325 (65 FE) MG tablet Take 1 tablet (325 mg) by mouth 2 times daily With meals 60 tablet 2     fluticasone (FLONASE) 50 MCG/ACT nasal spray Spray 1 spray into both nostrils daily       hydrochlorothiazide (MICROZIDE) 12.5 MG capsule Take 1 capsule (12.5 mg) by mouth daily 90 capsule 2     lactulose (CHRONULAC) 10 GM/15ML solution Take 20 g by mouth as needed for constipation       latanoprost (XALATAN) 0.005 % ophthalmic solution Place 1 drop into both eyes At Bedtime 2.5 mL 11     levETIRAcetam (KEPPRA) 500 MG tablet  Take 1 tablet (500 mg) by mouth 2 times daily 180 tablet 1     lidocaine (LIDODERM) 5 % Patch APPLY 1 TO 3 PATCHES TO PAINFUL AREA AT ONCE FOR UP TO 12 HOURS WITHIN A 24 HOUR PERIOD REMOVE AFTER 12 HOURS 30 patch 5     lisinopril (PRINIVIL/ZESTRIL) 20 MG tablet TAKE 1 TABLET BY MOUTH DAILY DX:HYPERTENSION 90 tablet 3     metFORMIN (GLUCOPHAGE-XR) 500 MG 24 hr tablet Take 1 tablet (500 mg) by mouth daily (with dinner) 90 tablet 3     metoprolol succinate (TOPROL-XL) 50 MG 24 hr tablet TAKE 1 TABLET BY MOUTH DAILY 90 tablet 3     Multiple Vitamin (MULTIVITAMIN OR) Take 1 tablet by mouth daily        nicotine (NICODERM CQ) 14 MG/24HR 24 hr patch Place 1 patch onto the skin daily 30 patch 0     nitroglycerin (NITROSTAT) 0.4 MG SL tablet Place 1 tablet (0.4 mg) under the tongue every 5 minutes as needed for chest pain if you are still having symptoms after 3 doses (15 minutes) call 911. 25 tablet 1     ondansetron (ZOFRAN-ODT) 8 MG ODT tab Take 1 tablet (8 mg) by mouth every 8 hours as needed 30 tablet 5     order for DME Equipment being ordered: Power Wheel Chair 1 Device 0     order for DME Equipment being ordered: bandage tape, prefer paper 1 Package 0     order for DME Full electric hospital bed with half rails    Dx: S40992, I110, J449  Length of need: lifetime 1 Device 0     order for DME Wheel Chair Cushion: 18 x 18 inch Roho cushion 1 Device 0     order for DME Hospital bed with side rails 1 Device 0     order for DME Equipment being ordered: CPAP supplies mask, hose, filters, cushion    fax to [facility] at [phone] 10 Device prn     order for DME Equipment being ordered: CPAP supplies mask, hose, filters, cushion fax to [facility] at [phone]  Disp: 10 Device Refills: prn   Class: Local Print Start: 2/10/2017 1 Device 0     order for DME Equipment being ordered: Nebulizer and tubing supplies 1 Units 0     order for DME Equipment being ordered: Glucerna daily shakes with each meal 1 Box 11      ORDER FOR DME Equipment being ordered: Power Wheelchair 1 Device 0     ORDER FOR DME Equipment being ordered: Depends briefs 1 Month 11     pantoprazole (PROTONIX) 40 MG EC tablet Take 1 tablet (40 mg) by mouth daily 30 tablet 5     phenytoin 200 MG CAPS Take 200 mg by mouth 2 times daily 60 capsule 0     polyethylene glycol (MIRALAX/GLYCOLAX) Packet Take 17 g by mouth daily Dissolved in water or juice        pregabalin (LYRICA) 75 MG capsule Take 2 capsules (150 mg) by mouth 2 times daily 120 capsule 5     risperiDONE (RISPERDAL) 0.5 MG tablet Take 0.5 mg by mouth At Bedtime       solifenacin (VESICARE) 10 MG tablet Take 1 tablet (10 mg) by mouth daily 90 tablet 3     sucralfate (CARAFATE) 1 GM tablet Take 1 tablet (1 g) by mouth 4 times daily May dissolve in 10 mL water is necessary. (Start upon completion of carafate suspension) 120 tablet 11     traZODone (DESYREL) 50 MG tablet Take 150 mg by mouth At Bedtime        umeclidinium (INCRUSE ELLIPTA) 62.5 MCG/INH oral inhaler Inhale 1 puff into the lungs daily       VENTOLIN  (90 BASE) MCG/ACT Inhaler Inhale 2 puffs into the lungs every 6 hours as needed for shortness of breath / dyspnea or wheezing 3 Inhaler 5     zinc 50 MG TABS Take 1 tablet by mouth daily       ciprofloxacin (CIPRO) 250 MG tablet Take 1 tablet (250 mg) by mouth 2 times daily 14 tablet 0     clotrimazole (LOTRIMIN) 1 % cream INSERT 1 APPLICATORFUL VAGINALLY TWICE A DAY FOR VAGINAL PAIN 12 g 0     diclofenac (VOLTAREN) 1 % GEL topical gel Apply 2 g topically 4 times daily to hands 100 g 11     ROS:  10 point ROS of systems including Constitutional, Eyes, Respiratory, Cardiovascular, Gastroenterology, Genitourinary, Integumentary, Musculoskeletal, Psychiatric were all negative except for pertinent positives noted in my HPI.    Vitals:  /86   Pulse 82   Temp 98  F (36.7  C)   Resp 18   Wt 62.6 kg (138 lb)   SpO2 97%   BMI 52.47 kg/m     Exam:  GENERAL APPEARANCE:  Alert, in no  distress  ENT:  Mouth and posterior oropharynx normal, moist mucous membranes  EYES:  EOM, conjunctivae, lids, pupils and irises normal  RESP:  respiratory effort and palpation of chest normal, lungs clear to auscultation , no respiratory distress  CV:  Palpation and auscultation of heart done , regular rate and rhythm, no murmur, rub, or gallop  ABDOMEN:  normal bowel sounds, soft, nontender, no hepatosplenomegaly or other masses  M/S:   Active movement of bilateral upper extremities. 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  SKIN:  Inspection of skin and subcutaneous tissue baseline, Palpation of skin and subcutaneous tissue baseline  NEURO:   Cranial nerves 2-12 are normal tested and grossly at patient's baseline  PSYCH:  oriented to person and place    Lab/Diagnostic data:  Labs done in SNF are in [place] EPIC. Please refer to them using [employer] Everywhere.    ASSESSMENT/PLAN:  Residential Relocation. Residing at [facility]. Awaiting Elderly Waiver so she can move back to [facility], where she resided prior to moving to [place] in August 2018.     Chronic Pain Syndrome/Neuropathy. With history of intrathecal pain pump that was not covered by insurance while in [place] per PCP notes. Previously followed by [facility] in [place], now [facility]. As patient exhibits no signs of distress throughout visit, even after transferring from wheelchair to bed with sliding board independently, will not initiate narcotics at this time as patient has been weaned off all narcotics. Will request facility arrange appointment with Pain Clinic. Continue Acetaminophen, Diclofenac, Lidocaine Patch and Pregabalin as ordered.    Severe Peripheral Artery Disease S/P Right Above the Knee Amputation on 11/16/16 and Left Above the Knee Amputation on 6/11/16. Reports she needs a new power wheelchair as her previous wheelchair broke. Appears PCP is working on this with [employer]. Taking Aspirin and  Atorvastatin.    Iron Deficiency Anemia with History of Sickle Cell Trait. No recent Hemoglobin on file. Will check CBC on next lab day. Continue Ferrous Sulfate as ordered.    Seizure Disorder. Last seizure noted to be 8 years ago on 4/5/18. Uncertain if patient has had any seizures since.. Followed by Dr. Patterson at [facility]. Last seen 4/5/18. Patient reports appointment needs to be made. Continue Levetiracetam and Phenytoin as ordered. Due for repeat lab values.     Carotid Artery Stenosis with History of Multiple CVA. At least 3. Most recent noted to be in 2012. Followed by Dr. Patterson at [facility]. Needs to make follow-up appointment. Continue Aspirin and Atorvastatin as ordered.    Coronary Artery Disease S/P COLLEEN x 2 to prox-to-mid RCA on 9/17/16/Inferior Wall MI in 2016/Chronic Systolic Heart Failure with EF > 70% on 5/2/18 /Hypertension/Hyperlipidemia. Last seen by Dr. Anderson on 6/26/18. Monitor blood pressures weekly. Continue Aspirin, Atorvastatin, Hydrochlorothiazide, Lisinopril and Nitroglycerin as ordered.    Chronic Bronchitis. Last seen by Dr. Alina Jennings in Pulmonology on 8/13/18. Continue Advair, Albuterol and Umeclidinium as ordered. Patient reports she would like follow-up visit arranged.     Type 2 Diabetes Mellitus. Followed by Dr. Irizarry in Endocrinology. Last seen 5/4/18. Last A1C 5.0 on 4/17/19. Question if treatment is indicated as risks of tight control may outweigh benefit. Taking Metformin daily. No indication to check routine blood sugars while at facility unless symptomatic.     Chronic Dysphagia with Esophageal Strictures S/P Dilatation 9/6/16 and Recent Upper GI Bleed due to Esophagitis and Gastritis on 9/23/16. Continue Pantoprazole and Sucralfate as ordered.    Obstructive Sleep Apnea. CPAP ordered through [facility]. Last seen by Dr. Kowalski in the Sleep Clinic on 6/12/18.     Tobacco Abuse. Quit smoking while in  [place], but has now restarted since returning to [place]. Will discontinue Nicoderm Patch. Encourage cessation. Educated on risks of smoking. Need to stop Nicotine Patch.    Schizoaffective Disorder/Adjustment Disorder/Anxiety/Depression. May benefit from seeing in-house psych while at facility. Continue Escitalopram and Risperidone as ordered. Also on Trazodone for sleep.     Glaucoma and Legal Eye Blindness. Needs to re-establish care with Dr. Robin. Continue Brimonidine and Dorzolamide as ordered.     History of Sex Transformation Surgery in 1974. Noted.     Restless Leg Syndrome. Noted. On no medications.     Constipation. Continue Miralax and Lactulose as ordered.    Allergic Rhinitis. Continue Cetirizine and Fluticasone as ordered.    Osteoporosis. Continue Alendronate, Vitamin D and Calcium supplement as ordered. Appears to have been on Alendronate since 2012. Consider drug holiday. Would defer to PCP.    Urinary Tract Infection/Overactive Bladder. Followed by Urology. Last seen by Dr. Oliver on 7/5/18. Continue Solifenacin as ordered. 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 Ciprofloxacin ordered x 7 days.     Total time spent with patient visit was 35 min including patient visit and review of past records. Greater than 50% of total time spent with counseling and coordinating care due to review of past medical records and discussion with patient.     Electronically signed by:  VICTOR M Mejia CNP                   Sincerely,        VICTOR M Mejia CNP

## 2019-04-23 NOTE — PROGRESS NOTES
[place] [facility]  PRIMARY CARE PROVIDER AND CLINIC:  Dr. Allan Casey, [facility]  Chief Complaint   Patient presents with     intermediate Acute     [place] Medical Record Number:  [id number]  Place of Service where encounter took place:  [facility] [[id number][facility]    Sonya Foote  is a 69 year old  (1950), admitted to the above facility from [facility]..  Admitted to this facility for  medical management and nursing care.    HPI:    HPI information obtained from: facility chart records, facility staff, patient report and [facility] chart review.     This is a 69-year old female, with a past medical history significant for peripheral artery disease s/p left above the knee amputation on 6/11/16, right above the knee amputation 11/16/16, CVA x 3, seizure disorder, chronic bronchitis, JOSE, tobacco abuse, type 2 diabetes mellitus, CAD with COLLEEN x 2 to prox-to-mid RCA on 9/17/16, chronic dysphagia with esophageal strictures s/p dilatation 9/6/16, chronic pain with previous intrathecal pain pump, schizoaffective disorder, depression, anxiety and Sickle trait, who was admitted to [facility] by her PCP who she recently re-established care with after living in [place] from August 2018 to April 2019. Patient is seeking short-term placement until her Elder Waiver can be put back in place so she can return to her previous residence, [facility].     Seen by PCP on 4/17/19 to re-establish care. 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 Ciprofloxacin ordered x 7 days.     Updates on Status Since Skilled nursing Admission:     Complains of pain. Needs to re-establish care with [facility]. Gets cards out of wallet for medical providers she was followed by prior to moving to [place]. Needs to get a new power wheelchair as her other wheelchair broke. Reports she fell 2 times while living with her sister in [place]. Had to  quit smoking while living with her sister, but has now started smoking again after returning to [place]. Was not happy with the healthcare she received in [place]. Is looking forward to seeing her grandchildren. Would like her son to be contacted first in the event of an emergency.     CODE STATUS/ADVANCE DIRECTIVES DISCUSSION:   [facility] Epic - DNR/DNI [facility] Paperwork - DNR/DNI - Discussion today - Patient initially says FULL CODE, but later says she would not want her ribs broken during CPR.   Patient's living condition: Living in [facility] in [facility] temporarily. Previously lived with sister in [place] since August 2018  ALLERGIES: Bee venom; Penicillins; Dilantin [phenytoin]; Iodide; Iodine-131; Novocaine [procaine]; and Tositumomab  PAST MEDICAL HISTORY:  has a past medical history of Anemia, Antiplatelet or antithrombotic long-term use, Arthritis, AS (sickle cell trait) (H) (10/8/2013), Blind left eye, BPPV (benign paroxysmal positional vertigo), Chronic back pain, COPD (chronic obstructive pulmonary disease) (H), Coronary artery disease, CVA (cerebral infarction) (10/8/2012), Diastolic CHF (H) (9/4/2012), Dilantin toxicity (5/31/2013), Esophageal candidiasis (H), GERD (gastroesophageal reflux disease), Heart attack (H), Hernia, abdominal, History of blood transfusion, History of thrombophlebitis, HTN (hypertension) (9/4/2012), Hyperlipidemia LDL goal <100 (9/4/2012), Legally blind in right eye, as defined in [place], Osteoporosis (1/21/2013), Other chronic pain, PAD (peripheral artery disease) (H), Palpitations, Person who has had sex change operation (1/20/2014), Pneumonia, Schizoaffective disorder (H), Seizure disorder (H) (9/4/2012), Sleep apnea, Thrombosis of leg, Type 2 diabetes, HbA1C goal < 8% (H) (9/4/2012), Uncomplicated asthma, and Unspecified cerebral artery occlusion with cerebral infarction. She also has no past medical history of Asymptomatic human immunodeficiency virus (HIV)  infection status (H), Cerebral palsy (H), Congenital renal agenesis and dysgenesis, Difficult intubation, Goiter, Gout, History of spinal cord injury, Horseshoe kidney, Hydrocephalus, Malignant hyperthermia, Mumps, Parkinsons disease (H), Spider veins, Spina bifida (H), STD (sexually transmitted disease), Tethered cord (H), or Tuberculosis.  PAST SURGICAL HISTORY:   has a past surgical history that includes appendectomy; tonsillectomy; Cholecystectomy; orthopedic surgery; Breast surgery; sinus surgery; Esophagoscopy, gastroscopy, duodenoscopy (EGD), combined (12/14/2012); vascular surgery; Esophagoscopy, gastroscopy, duodenoscopy (EGD), combined (12/31/2013); Sex transformation, male to female; Esophagoscopy, gastroscopy, duodenoscopy (EGD), combined (4/1/2014); Endarterectomy femoral (5/23/2014); Esophagoscopy, gastroscopy, duodenoscopy (EGD), combined (6/28/2014); Colonoscopy with CO2 insufflation (N/A, 8/20/2014); Esophagoscopy, gastroscopy, duodenoscopy (EGD), combined (N/A, 8/20/2014); Esophagoscopy, gastroscopy, duodenoscopy (EGD), combined (N/A, 8/22/2014); CAPSULE ENDOSCOPY (N/A, 8/25/2014); Colonoscopy (N/A, 8/25/2014); CAPSULE ENDOSCOPY (N/A, 10/2/2014); Esophagoscopy, gastroscopy, duodenoscopy (EGD), combined (N/A, 10/2/2014); Angiogram (Bilateral, 11/21/2014); Esophagoscopy, gastroscopy, duodenoscopy (EGD), combined (Left, 12/15/2014); Angiogram (Left, 1/16/2015); Esophagoscopy, gastroscopy, duodenoscopy (EGD), combined (N/A, 2/25/2015); Esophagoscopy, gastroscopy, duodenoscopy (EGD), combined (Left, 2/25/2015); Angiogram (Bilateral, 9/14/2015); Angiogram (Left, 10/12/2015); Amputate toe(s) (Right, 1/5/2016); Amputate leg above knee (Left, 6/11/2016); Fasciotomy lower extremity (Left, 6/10/2016); Angiogram (Right, 6/6/2016); Angioplasty (Right, 6/6/2016); Esophagoscopy, gastroscopy, duodenoscopy (EGD), combined (N/A, 9/25/2016); Amputate leg below knee (Right, 11/7/2016); Amputate revision stump lower  extremity (Right, 11/11/2016); Amputate revision stump lower extremity (Right, 11/16/2016); Esophagoscopy, gastroscopy, duodenoscopy (EGD), combined (N/A, 1/18/2017); Esophagoscopy, gastroscopy, duodenoscopy (EGD), combined (N/A, 11/26/2017); Esophagoscopy, gastroscopy, duodenoscopy (EGD), combined (N/A, 11/26/2017); Cystostomy, insert tube suprapubic, combined (N/A, 1/16/2018); and cataract iol, rt/lt (Right).  FAMILY HISTORY: family history includes Breast Cancer in her sister; Cancer in her maternal aunt and maternal aunt; Cerebrovascular Disease in her brother; Coronary Artery Disease in her mother; Dementia in her mother; Depression in her brother and sister; Diabetes in her father, maternal grandfather, maternal grandmother, mother, paternal grandfather, paternal grandmother, and sister; Glaucoma in her father, maternal grandfather, maternal grandmother, mother, paternal grandfather, and paternal grandmother; Heart Failure in her father; Schizophrenia in her brother; Suicide in her sister.  SOCIAL HISTORY:   reports that she has been smoking cigarettes and cigars.  She has a 7.50 pack-year smoking history. She has never used smokeless tobacco. She reports that she does not drink alcohol or use drugs.    Post Discharge Medication Reconciliation Status: discharge medications reconciled, continue medications without change    Current Outpatient Medications   Medication Sig Dispense Refill     ACETAMINOPHEN PO Take 1,000 mg by mouth every 4 hours as needed for pain Not to exceed 4000 mg/day       ADVAIR DISKUS 250-50 MCG/DOSE diskus inhaler Inhale 1 puff into the lungs 2 times daily 60 Inhaler 11     albuterol (2.5 MG/3ML) 0.083% neb solution INHALE 1 VIAL VIA NEBULIZER EVERY 6 HOURS AS NEEDED 360 mL 11     alendronate (FOSAMAX) 70 MG tablet Take 1 tablet (70 mg) by mouth with 8oz water every 7 days 30 minutes before breakfast and remain upright during this time. 12 tablet 3     aspirin EC 81 MG EC tablet Take 1  tablet (81 mg) by mouth daily 90 tablet 3     atorvastatin (LIPITOR) 40 MG tablet Take 1 tablet (40 mg) by mouth daily 90 tablet 2     blood glucose monitoring (ONE TOUCH ULTRA 2) meter device kit Use to test blood sugars 3 times daily or as directed. 1 kit 0     blood glucose monitoring (ONE TOUCH ULTRA) test strip Use to test blood sugars 3 times daily or as directed. 3 Box 3     blood glucose monitoring (ONE TOUCH ULTRASOFT) lancets Use to test blood sugar 3 times daily or as directed. 100 each PRN     brimonidine (ALPHAGAN) 0.2 % ophthalmic solution Place 1 drop into the right eye 2 times daily 1 Bottle 11     calcium citrate-vitamin D (CITRACAL) 315-250 MG-UNIT TABS Take 2 tablets by mouth daily 120 tablet 5     cetirizine (ZYRTEC) 10 MG tablet Take 1 tablet (10 mg) by mouth every evening 30 tablet 3     Cholecalciferol (VITAMIN D) 2000 UNITS tablet Take 2,000 Units by mouth daily. 100 tablet 3     CYANOCOBALAMIN PO Take 2,000 mcg by mouth daily       dorzolamide (TRUSOPT) 2 % ophthalmic solution Place 1 drop into the right eye 2 times daily 1 Bottle 11     EPINEPHrine (EPIPEN/ADRENACLICK/OR ANY BX GENERIC EQUIV) 0.3 MG/0.3ML injection 2-pack Inject 0.3 mLs (0.3 mg) into the muscle as needed for anaphylaxis 0.6 mL 1     escitalopram (LEXAPRO) 10 MG tablet Take 10 mg by mouth daily        ferrous sulfate (IRON) 325 (65 FE) MG tablet Take 1 tablet (325 mg) by mouth 2 times daily With meals 60 tablet 2     fluticasone (FLONASE) 50 MCG/ACT nasal spray Spray 1 spray into both nostrils daily       hydrochlorothiazide (MICROZIDE) 12.5 MG capsule Take 1 capsule (12.5 mg) by mouth daily 90 capsule 2     lactulose (CHRONULAC) 10 GM/15ML solution Take 20 g by mouth as needed for constipation       latanoprost (XALATAN) 0.005 % ophthalmic solution Place 1 drop into both eyes At Bedtime 2.5 mL 11     levETIRAcetam (KEPPRA) 500 MG tablet Take 1 tablet (500 mg) by mouth 2 times daily 180 tablet 1     lidocaine (LIDODERM) 5 %  Patch APPLY 1 TO 3 PATCHES TO PAINFUL AREA AT ONCE FOR UP TO 12 HOURS WITHIN A 24 HOUR PERIOD REMOVE AFTER 12 HOURS 30 patch 5     lisinopril (PRINIVIL/ZESTRIL) 20 MG tablet TAKE 1 TABLET BY MOUTH DAILY DX:HYPERTENSION 90 tablet 3     metFORMIN (GLUCOPHAGE-XR) 500 MG 24 hr tablet Take 1 tablet (500 mg) by mouth daily (with dinner) 90 tablet 3     metoprolol succinate (TOPROL-XL) 50 MG 24 hr tablet TAKE 1 TABLET BY MOUTH DAILY 90 tablet 3     Multiple Vitamin (MULTIVITAMIN OR) Take 1 tablet by mouth daily        nicotine (NICODERM CQ) 14 MG/24HR 24 hr patch Place 1 patch onto the skin daily 30 patch 0     nitroglycerin (NITROSTAT) 0.4 MG SL tablet Place 1 tablet (0.4 mg) under the tongue every 5 minutes as needed for chest pain if you are still having symptoms after 3 doses (15 minutes) call 911. 25 tablet 1     ondansetron (ZOFRAN-ODT) 8 MG ODT tab Take 1 tablet (8 mg) by mouth every 8 hours as needed 30 tablet 5     order for DME Equipment being ordered: Power Wheel Chair 1 Device 0     order for DME Equipment being ordered: bandage tape, prefer paper 1 Package 0     order for DME Full electric hospital bed with half rails    Dx: N10389, I110, J449  Length of need: lifetime 1 Device 0     order for DME Wheel Chair Cushion: 18 x 18 inch Roho cushion 1 Device 0     order for DME Hospital bed with side rails 1 Device 0     order for DME Equipment being ordered: CPAP supplies mask, hose, filters, cushion    fax to [facility] at [phone] 10 Device prn     order for DME Equipment being ordered: CPAP supplies mask, hose, filters, cushion fax to [facility] at [phone]  Disp: 10 Device Refills: prn   Class: Local Print Start: 2/10/2017 1 Device 0     order for DME Equipment being ordered: Nebulizer and tubing supplies 1 Units 0     order for DME Equipment being ordered: Glucerna daily shakes with each meal 1 Box 11     ORDER FOR DME Equipment being ordered: Power Wheelchair 1 Device 0     ORDER FOR DME  Equipment being ordered: Depends briefs 1 Month 11     pantoprazole (PROTONIX) 40 MG EC tablet Take 1 tablet (40 mg) by mouth daily 30 tablet 5     phenytoin 200 MG CAPS Take 200 mg by mouth 2 times daily 60 capsule 0     polyethylene glycol (MIRALAX/GLYCOLAX) Packet Take 17 g by mouth daily Dissolved in water or juice        pregabalin (LYRICA) 75 MG capsule Take 2 capsules (150 mg) by mouth 2 times daily 120 capsule 5     risperiDONE (RISPERDAL) 0.5 MG tablet Take 0.5 mg by mouth At Bedtime       solifenacin (VESICARE) 10 MG tablet Take 1 tablet (10 mg) by mouth daily 90 tablet 3     sucralfate (CARAFATE) 1 GM tablet Take 1 tablet (1 g) by mouth 4 times daily May dissolve in 10 mL water is necessary. (Start upon completion of carafate suspension) 120 tablet 11     traZODone (DESYREL) 50 MG tablet Take 150 mg by mouth At Bedtime        umeclidinium (INCRUSE ELLIPTA) 62.5 MCG/INH oral inhaler Inhale 1 puff into the lungs daily       VENTOLIN  (90 BASE) MCG/ACT Inhaler Inhale 2 puffs into the lungs every 6 hours as needed for shortness of breath / dyspnea or wheezing 3 Inhaler 5     zinc 50 MG TABS Take 1 tablet by mouth daily       ciprofloxacin (CIPRO) 250 MG tablet Take 1 tablet (250 mg) by mouth 2 times daily 14 tablet 0     clotrimazole (LOTRIMIN) 1 % cream INSERT 1 APPLICATORFUL VAGINALLY TWICE A DAY FOR VAGINAL PAIN 12 g 0     diclofenac (VOLTAREN) 1 % GEL topical gel Apply 2 g topically 4 times daily to hands 100 g 11     ROS:  10 point ROS of systems including Constitutional, Eyes, Respiratory, Cardiovascular, Gastroenterology, Genitourinary, Integumentary, Musculoskeletal, Psychiatric were all negative except for pertinent positives noted in my HPI.    Vitals:  /86   Pulse 82   Temp 98  F (36.7  C)   Resp 18   Wt 62.6 kg (138 lb)   SpO2 97%   BMI 52.47 kg/m    Exam:  GENERAL APPEARANCE:  Alert, in no distress  ENT:  Mouth and posterior oropharynx normal, moist mucous membranes  EYES:   EOM, conjunctivae, lids, pupils and irises normal  RESP:  respiratory effort and palpation of chest normal, lungs clear to auscultation , no respiratory distress  CV:  Palpation and auscultation of heart done , regular rate and rhythm, no murmur, rub, or gallop  ABDOMEN:  normal bowel sounds, soft, nontender, no hepatosplenomegaly or other masses  M/S:   Active movement of bilateral upper extremities. 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  SKIN:  Inspection of skin and subcutaneous tissue baseline, Palpation of skin and subcutaneous tissue baseline  NEURO:   Cranial nerves 2-12 are normal tested and grossly at patient's baseline  PSYCH:  oriented to person and place    Lab/Diagnostic data:  Labs done in SNF are in [facility]. Please refer to them using [employer] Everywhere.    ASSESSMENT/PLAN:  Residential Relocation. Residing at [facility]. Awaiting Elderly Waiver so she can move back to [facility], where she resided prior to moving to [place] in August 2018.     Chronic Pain Syndrome/Neuropathy. With history of intrathecal pain pump that was not covered by insurance while in [place] per PCP notes. Previously followed by [facility] in [place], now [facility]. As patient exhibits no signs of distress throughout visit, even after transferring from wheelchair to bed with sliding board independently, will not initiate narcotics at this time as patient has been weaned off all narcotics. Will request facility arrange appointment with Pain Clinic. Continue Acetaminophen, Diclofenac, Lidocaine Patch and Pregabalin as ordered.    Severe Peripheral Artery Disease S/P Right Above the Knee Amputation on 11/16/16 and Left Above the Knee Amputation on 6/11/16. Reports she needs a new power wheelchair as her previous wheelchair broke. Appears PCP is working on this with [employer]. Taking Aspirin and Atorvastatin.    Iron Deficiency Anemia with History of Sickle Cell Trait. No recent Hemoglobin on  file. Will check CBC on next lab day. Continue Ferrous Sulfate as ordered.    Seizure Disorder. Last seizure noted to be 8 years ago on 4/5/18. Uncertain if patient has had any seizures since.. Followed by Dr. Patterson at [facility]. Last seen 4/5/18. Patient reports appointment needs to be made. Continue Levetiracetam and Phenytoin as ordered. Due for repeat lab values.     Carotid Artery Stenosis with History of Multiple CVA. At least 3. Most recent noted to be in 2012. Followed by Dr. Patterson at [facility]. Needs to make follow-up appointment. Continue Aspirin and Atorvastatin as ordered.    Coronary Artery Disease S/P COLLEEN x 2 to prox-to-mid RCA on 9/17/16/Inferior Wall MI in 2016/Chronic Systolic Heart Failure with EF > 70% on 5/2/18 /Hypertension/Hyperlipidemia. Last seen by Dr. Anderson on 6/26/18. Monitor blood pressures weekly. Continue Aspirin, Atorvastatin, Hydrochlorothiazide, Lisinopril and Nitroglycerin as ordered.    Chronic Bronchitis. Last seen by Dr. Alina Jennings in Pulmonology on 8/13/18. Continue Advair, Albuterol and Umeclidinium as ordered. Patient reports she would like follow-up visit arranged.     Type 2 Diabetes Mellitus. Followed by Dr. Irizarry in Endocrinology. Last seen 5/4/18. Last A1C 5.0 on 4/17/19. Question if treatment is indicated as risks of tight control may outweigh benefit. Taking Metformin daily. No indication to check routine blood sugars while at facility unless symptomatic.     Chronic Dysphagia with Esophageal Strictures S/P Dilatation 9/6/16 and Recent Upper GI Bleed due to Esophagitis and Gastritis on 9/23/16. Continue Pantoprazole and Sucralfate as ordered.    Obstructive Sleep Apnea. CPAP ordered through [facility]. Last seen by Dr. Kowalski in the Sleep Clinic on 6/12/18.     Tobacco Abuse. Quit smoking while in [place], but has now restarted since returning to [place]. Will discontinue Nicoderm Patch.  Encourage cessation. Educated on risks of smoking. Need to stop Nicotine Patch.    Schizoaffective Disorder/Adjustment Disorder/Anxiety/Depression. May benefit from seeing in-house psych while at facility. Continue Escitalopram and Risperidone as ordered. Also on Trazodone for sleep.     Glaucoma and Legal Eye Blindness. Needs to re-establish care with Dr. Robin. Continue Brimonidine and Dorzolamide as ordered.     Restless Leg Syndrome. Noted. On no medications.     Constipation. Continue Miralax and Lactulose as ordered.    Allergic Rhinitis. Continue Cetirizine and Fluticasone as ordered.    Osteoporosis. Continue Alendronate, Vitamin D and Calcium supplement as ordered. Appears to have been on Alendronate since 2012. Consider drug holiday. Would defer to PCP.    Urinary Tract Infection/Overactive Bladder. Followed by Urology. Last seen by Dr. Oliver on 7/5/18. Continue Solifenacin as ordered. 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 Ciprofloxacin ordered x 7 days.     Total time spent with patient visit was 35 min including patient visit and review of past records. Greater than 50% of total time spent with counseling and coordinating care due to review of past medical records and discussion with patient.     Electronically signed by:  VICTOR M Mjeia CNP

## 2019-04-24 ENCOUNTER — TELEPHONE (OUTPATIENT)
Dept: INTERNAL MEDICINE | Facility: CLINIC | Age: 69
End: 2019-04-24

## 2019-04-24 ENCOUNTER — PATIENT OUTREACH (OUTPATIENT)
Dept: CARE COORDINATION | Facility: CLINIC | Age: 69
End: 2019-04-24

## 2019-04-24 ASSESSMENT — ACTIVITIES OF DAILY LIVING (ADL): DEPENDENT_IADLS:: CLEANING;COOKING;LAUNDRY;SHOPPING;MEAL PREPARATION;TRANSPORTATION

## 2019-04-24 NOTE — PROGRESS NOTES
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 Though her primary concern is getting back to her [facility] that she was residing prior to moving to [place].    Medication Management:  Pt reported that she is experiencing a lot of pain. She has a referral from PCP for [facility] pain clinic. Pt would like to return to her prior pain clinic in [place]. She spoke with [facility] in the TCU and they took the information from her and will be assisting her in making an appointment. Pt declined help in this area from [facility]. Pt would like PCP to prescribe a pain medication, CC SW directly pt to contact clinic directly to speak with a nurse regarding this request.    Living Situation:  Pt is living at [facility] and continues to wait for a Waiver approval so she can move to prior [facility].    Goals:   Goals        General    Psychosocial (pt-stated)     Notes - Note created  4/18/2019 12:09 PM by Grazyna Padgett LGSW    Goal Statement: Within the next 1-2 months, I want to move back into my previous home at [facility] in [place] for my overall wellbeing.     Measure of Success: Pt will contact [facility] and follow their instructions to apply for MA and Elderly waiver. Pt will verbally inform  JUANCARLOS of success.    Supportive Steps to Achieve: CC SW provided [facility] Front Door number to start process. CC SW will provide ongoing assistance and follow up to determine next steps to succeed in goal    Barriers: Potential barriers to time in conversing with the [facility]    Strengths: Pt is very motivated to secure services to increase independence, recognition of need for assistance, support system    Date to Achieve  By: 6/18/2019    Patient expressed understanding of goal: Pt verbally stated understanding of goal          Patient/Caregiver understanding: Pt verbally stated understanding    Outreach Frequency: weekly  Future Appointments              In 2 months Guanaco Kowalski MD [facility],  SLEEP        Plan: CC SW will follow up in 1 week to discuss pt's overall wellbeing and additional needs. Pt was reminded of CC SW contact information and encouraged to call with questions of concerns that arise before next outreach.    NAOMI Allan, LGSW  Clinic Care Coordinator  [facility]  [phone]  [email or web address]

## 2019-04-24 NOTE — TELEPHONE ENCOUNTER
Pain medication needs to be arranged via the pain clinic that the patient was getting it from prior to her departure for [place]

## 2019-04-24 NOTE — TELEPHONE ENCOUNTER
Reason for Call:  Other call back    Detailed comments: Pt is requesting a call back from Dr Casey's coordinator to discuss [facility].    Phone Number Patient can be reached at: Home number on file [phone] (home)    Best Time: anytime    Can we leave a detailed message on this number? YES    Call taken on 4/24/2019 at 10:28 AM by DUKE FOSTER

## 2019-04-24 NOTE — TELEPHONE ENCOUNTER
PCP please advise of patient request. No current pain medication on problem list. Patient requesting med prior to pain clinic visit.      Per 4/19/2019 LOV note:  G89.4) Chronic pain syndrome  Comment: I do not think it is necessarily bad idea that she is not on any narcotics although she feels that she is in quite a significant amount of pain thus I am reluctant to start any narcotics until she is seen and reestablished at a pain clinic to determine what treatment options of the best for her.  Continuing with nonnarcotic analgesic care.  Plan[facility]         COORDINATION REFERRAL      Valeria ZAMBRANO RN, BSN, PHN

## 2019-04-24 NOTE — TELEPHONE ENCOUNTER
Patient is in severe pain in lower back and legs. She wants something until she goes to the pain clinic. She also has spasms in neck and upper back and wants a muscle relaxer.  Ph.[phone], ok to lv detailed message.  Send to [facility] on [address]. Phone number is [phone]

## 2019-04-25 ENCOUNTER — DOCUMENTATION ONLY (OUTPATIENT)
Dept: OTHER | Facility: CLINIC | Age: 69
End: 2019-04-25

## 2019-04-25 ENCOUNTER — NURSING HOME VISIT (OUTPATIENT)
Dept: GERIATRICS | Facility: CLINIC | Age: 69
End: 2019-04-25
Payer: COMMERCIAL

## 2019-04-25 VITALS
DIASTOLIC BLOOD PRESSURE: 86 MMHG | OXYGEN SATURATION: 97 % | BODY MASS INDEX: 52.47 KG/M2 | SYSTOLIC BLOOD PRESSURE: 141 MMHG | TEMPERATURE: 98 F | WEIGHT: 138 LBS | HEART RATE: 82 BPM | RESPIRATION RATE: 18 BRPM

## 2019-04-25 DIAGNOSIS — Z71.89 ADVANCED DIRECTIVES, COUNSELING/DISCUSSION: Primary | ICD-10-CM

## 2019-04-25 DIAGNOSIS — K59.00 CONSTIPATION, UNSPECIFIED CONSTIPATION TYPE: ICD-10-CM

## 2019-04-25 DIAGNOSIS — R39.81 FUNCTIONAL INCONTINENCE: ICD-10-CM

## 2019-04-25 DIAGNOSIS — G89.4 CHRONIC PAIN SYNDROME: ICD-10-CM

## 2019-04-25 PROCEDURE — 99309 SBSQ NF CARE MODERATE MDM 30: CPT | Performed by: NURSE PRACTITIONER

## 2019-04-25 NOTE — PROGRESS NOTES
[facility]  [place] Medical Record Number:  [id number]  Place of Service where encounter took place:  [facility] ([facility]) [[id number]]  Chief Complaint   Patient presents with     Nursing Home Acute     HPI:    Sonya Foote  is a 69 year old (1950), who is being seen today for an episodic care visit.  HPI information obtained from: facility chart records, facility staff, patient report and [facility] chart review. Today's concern is:    Advanced Directives, Counseling/Discussion. Per discussion with patient on 4/23/19, requested FULL CODE status, but later stated she did not want her ribs broken. Today, confirms she would want CPR if indicated. Wants to live to see her grandchildren. Initially states she would not want an NG tube in her nose, but later state that's ok short term. Requests antibiotics. Ok with hospitalization.    Contacted son, Kam, who states he was under the impression patient would prefer DNR/DNI, but believes this may have changed as she has gone through the grieving process of losing her partner. Acknowledges patient is able to make her own decisions.    Constipation. States she has not had a bowel movement in 4 days. Requests something to help her have a bowel movement. No reports of abdominal pain.     Functional Incontinence S/P Suprapubic Catheter 1/16/18. Requests suprapubic catheter be changed every 2 weeks as this is how it was being changed prior to admission to facility.     Chronic Pain Syndrome. Reports being in pain last night. Woke up in the middle of the night and watched YouTube videos on her iPad. Left a message with the [facility] in [place]. Is waiting for a reply back. Was told her former provider has left the clinic.     Past Medical and Surgical History reviewed in Epic today.    MEDICATIONS:  Current Outpatient Medications   Medication Sig Dispense Refill     ACETAMINOPHEN PO Take 1,000 mg by mouth every 4 hours as needed for pain  Not to exceed 4000 mg/day       ADVAIR DISKUS 250-50 MCG/DOSE diskus inhaler Inhale 1 puff into the lungs 2 times daily 60 Inhaler 11     albuterol (2.5 MG/3ML) 0.083% neb solution INHALE 1 VIAL VIA NEBULIZER EVERY 6 HOURS AS NEEDED 360 mL 11     alendronate (FOSAMAX) 70 MG tablet Take 1 tablet (70 mg) by mouth with 8oz water every 7 days 30 minutes before breakfast and remain upright during this time. 12 tablet 3     aspirin EC 81 MG EC tablet Take 1 tablet (81 mg) by mouth daily 90 tablet 3     atorvastatin (LIPITOR) 40 MG tablet Take 1 tablet (40 mg) by mouth daily 90 tablet 2     blood glucose monitoring (ONE TOUCH ULTRA 2) meter device kit Use to test blood sugars 3 times daily or as directed. 1 kit 0     blood glucose monitoring (ONE TOUCH ULTRA) test strip Use to test blood sugars 3 times daily or as directed. 3 Box 3     blood glucose monitoring (ONE TOUCH ULTRASOFT) lancets Use to test blood sugar 3 times daily or as directed. 100 each PRN     brimonidine (ALPHAGAN) 0.2 % ophthalmic solution Place 1 drop into the right eye 2 times daily 1 Bottle 11     calcium citrate-vitamin D (CITRACAL) 315-250 MG-UNIT TABS Take 2 tablets by mouth daily 120 tablet 5     cetirizine (ZYRTEC) 10 MG tablet Take 1 tablet (10 mg) by mouth every evening 30 tablet 3     Cholecalciferol (VITAMIN D) 2000 UNITS tablet Take 2,000 Units by mouth daily. 100 tablet 3     ciprofloxacin (CIPRO) 250 MG tablet Take 1 tablet (250 mg) by mouth 2 times daily 14 tablet 0     clotrimazole (LOTRIMIN) 1 % cream INSERT 1 APPLICATORFUL VAGINALLY TWICE A DAY FOR VAGINAL PAIN 12 g 0     CYANOCOBALAMIN PO Take 2,000 mcg by mouth daily       diclofenac (VOLTAREN) 1 % GEL topical gel Apply 2 g topically 4 times daily to hands 100 g 11     dorzolamide (TRUSOPT) 2 % ophthalmic solution Place 1 drop into the right eye 2 times daily 1 Bottle 11     EPINEPHrine (EPIPEN/ADRENACLICK/OR ANY BX GENERIC EQUIV) 0.3 MG/0.3ML injection 2-pack Inject 0.3 mLs (0.3 mg)  into the muscle as needed for anaphylaxis 0.6 mL 1     escitalopram (LEXAPRO) 10 MG tablet Take 10 mg by mouth daily        ferrous sulfate (IRON) 325 (65 FE) MG tablet Take 1 tablet (325 mg) by mouth 2 times daily With meals 60 tablet 2     fluticasone (FLONASE) 50 MCG/ACT nasal spray Spray 1 spray into both nostrils daily       hydrochlorothiazide (MICROZIDE) 12.5 MG capsule Take 1 capsule (12.5 mg) by mouth daily 90 capsule 2     lactulose (CHRONULAC) 10 GM/15ML solution Take 20 g by mouth as needed for constipation       latanoprost (XALATAN) 0.005 % ophthalmic solution Place 1 drop into both eyes At Bedtime 2.5 mL 11     levETIRAcetam (KEPPRA) 500 MG tablet Take 1 tablet (500 mg) by mouth 2 times daily 180 tablet 1     lidocaine (LIDODERM) 5 % Patch APPLY 1 TO 3 PATCHES TO PAINFUL AREA AT ONCE FOR UP TO 12 HOURS WITHIN A 24 HOUR PERIOD REMOVE AFTER 12 HOURS 30 patch 5     lisinopril (PRINIVIL/ZESTRIL) 20 MG tablet TAKE 1 TABLET BY MOUTH DAILY DX:HYPERTENSION 90 tablet 3     metFORMIN (GLUCOPHAGE-XR) 500 MG 24 hr tablet Take 1 tablet (500 mg) by mouth daily (with dinner) 90 tablet 3     metoprolol succinate (TOPROL-XL) 50 MG 24 hr tablet TAKE 1 TABLET BY MOUTH DAILY 90 tablet 3     Multiple Vitamin (MULTIVITAMIN OR) Take 1 tablet by mouth daily        nicotine (NICODERM CQ) 14 MG/24HR 24 hr patch Place 1 patch onto the skin daily 30 patch 0     nitroglycerin (NITROSTAT) 0.4 MG SL tablet Place 1 tablet (0.4 mg) under the tongue every 5 minutes as needed for chest pain if you are still having symptoms after 3 doses (15 minutes) call 911. 25 tablet 1     ondansetron (ZOFRAN-ODT) 8 MG ODT tab Take 1 tablet (8 mg) by mouth every 8 hours as needed 30 tablet 5     order for DME Equipment being ordered: Power Wheel Chair 1 Device 0     order for DME Equipment being ordered: bandage tape, prefer paper 1 Package 0     order for DME Full electric hospital bed with half rails    Dx: M05245, I110, J449  Length of need:  lifetime 1 Device 0     order for DME Wheel Chair Cushion: 18 x 18 inch Roho cushion 1 Device 0     order for DME Hospital bed with side rails 1 Device 0     order for DME Equipment being ordered: CPAP supplies mask, hose, filters, cushion    fax to [facility] at [phone] 10 Device prn     order for DME Equipment being ordered: CPAP supplies mask, hose, filters, cushion fax to [facility] at [phone]  Disp: 10 Device Refills: prn   Class: Local Print Start: 2/10/2017 1 Device 0     order for DME Equipment being ordered: Nebulizer and tubing supplies 1 Units 0     order for DME Equipment being ordered: Glucerna daily shakes with each meal 1 Box 11     ORDER FOR DME Equipment being ordered: Power Wheelchair 1 Device 0     ORDER FOR DME Equipment being ordered: Depends briefs 1 Month 11     pantoprazole (PROTONIX) 40 MG EC tablet Take 1 tablet (40 mg) by mouth daily 30 tablet 5     phenytoin 200 MG CAPS Take 200 mg by mouth 2 times daily 60 capsule 0     polyethylene glycol (MIRALAX/GLYCOLAX) Packet Take 17 g by mouth daily Dissolved in water or juice        pregabalin (LYRICA) 75 MG capsule Take 2 capsules (150 mg) by mouth 2 times daily 120 capsule 5     risperiDONE (RISPERDAL) 0.5 MG tablet Take 0.5 mg by mouth At Bedtime       sucralfate (CARAFATE) 1 GM tablet Take 1 tablet (1 g) by mouth 4 times daily May dissolve in 10 mL water is necessary. (Start upon completion of carafate suspension) 120 tablet 11     traZODone (DESYREL) 50 MG tablet Take 150 mg by mouth At Bedtime        umeclidinium (INCRUSE ELLIPTA) 62.5 MCG/INH oral inhaler Inhale 1 puff into the lungs daily       VENTOLIN  (90 BASE) MCG/ACT Inhaler Inhale 2 puffs into the lungs every 6 hours as needed for shortness of breath / dyspnea or wheezing 3 Inhaler 5     zinc 50 MG TABS Take 1 tablet by mouth daily       solifenacin (VESICARE) 10 MG tablet Take 1 tablet (10 mg) by mouth daily 90 tablet 3     REVIEW OF SYSTEMS:  4 point ROS  including Respiratory, CV, GI and , other than that noted in the HPI,  is negative    Objective:  /86   Pulse 82   Temp 98  F (36.7  C)   Resp 18   Wt 62.6 kg (138 lb)   SpO2 97%   BMI 52.47 kg/m    Exam:  GENERAL APPEARANCE:  Alert, in no distress  ENT:  Mouth and posterior oropharynx normal, moist mucous membranes  EYES:  EOM, conjunctivae, lids, pupils and irises normal  RESP: No respiratory distress  M/S:   Active movement of bilateral upper extremities. 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  SKIN:  Inspection of skin and subcutaneous tissue baseline, Palpation of skin and subcutaneous tissue baseline  NEURO:   Cranial nerves 2-12 are normal tested and grossly at patient's baseline  PSYCH:  oriented to person and place    Labs:   Labs done in SNF are in [place] EPIC. Please refer to them using [employer] Everywhere.    ASSESSMENT/PLAN:  Advanced Directives, Counseling/Discussion. Confirms request for FULL CODE and aggressive measures despite previous documentation indicating more comfort focused. Completed new POLST form. Encouraged son, Kam, to discuss with patient in further detail so he is aware of her wishes.    Constipation. Added Senna-S to Miralax. Continue to monitor to determine if additional agent is necessary. Staff to use facility stand house orders for MOM or suppository as indicated.     Functional Incontinence S/P Suprapubic Catheter 1/16/18. Order written for harrell catheter change every 2 weeks per patient request. Given history of recurrent UTIs, may benefit from re-establishing care with Urology Clinic.     Chronic Pain Syndrome/Neuropathy. With history of intrathecal pain pump that was not covered by insurance while in [place] per PCP notes. Previously followed by [facility] in [place], now [facility]. Patient has left message with clinic to re-establish care. As patient exhibits no signs of distress throughout visit, will not initiate narcotics at this time as patient has been weaned off all  narcotics. Continue Acetaminophen, Diclofenac, Lidocaine Patch and Pregabalin as ordered.    Electronically signed by:  VICTOR M Mejia CNP

## 2019-04-25 NOTE — LETTER
4/25/2019        RE: Sonya Foote  [address] [facility] [address]  [place] [address]        [facility]  [place] Medical Record Number:  [id number]  Place of Service where encounter took place:  [facility] ([facility] [[id number]]  Chief Complaint   Patient presents with     Nursing Home Acute     HPI:    Sonya Foote  is a 69 year old (1950), who is being seen today for an episodic care visit.  HPI information obtained from: facility chart records, facility staff, patient report and [facility] chart review. Today's concern is:    Advanced Directives, Counseling/Discussion. Per discussion with patient on 4/23/19, requested FULL CODE status, but later stated she did not want her ribs broken. Today, confirms she would want CPR if indicated. Wants to live to see her grandchildren. Initially states she would not want an NG tube in her nose, but later state that's ok short term. Requests antibiotics. Ok with hospitalization.    Contacted son, Kam, who states he was under the impression patient would prefer DNR/DNI, but believes this may have changed as she has gone through the grieving process of losing her partner. Acknowledges patient is able to make her own decisions.    Constipation. States she has not had a bowel movement in 4 days. Requests something to help her have a bowel movement. No reports of abdominal pain.     Functional Incontinence S/P Suprapubic Catheter 1/16/18. Requests suprapubic catheter be changed every 2 weeks as this is how it was being changed prior to admission to facility.     Chronic Pain Syndrome. Reports being in pain last night. Woke up in the middle of the night and watched YouTube videos on her iPad. Left a message with the [facility] in [place]. Is waiting for a reply back. Was told her former provider has left the clinic.     Past Medical and Surgical History reviewed in Epic today.    MEDICATIONS:  Current Outpatient Medications    Medication Sig Dispense Refill     ACETAMINOPHEN PO Take 1,000 mg by mouth every 4 hours as needed for pain Not to exceed 4000 mg/day       ADVAIR DISKUS 250-50 MCG/DOSE diskus inhaler Inhale 1 puff into the lungs 2 times daily 60 Inhaler 11     albuterol (2.5 MG/3ML) 0.083% neb solution INHALE 1 VIAL VIA NEBULIZER EVERY 6 HOURS AS NEEDED 360 mL 11     alendronate (FOSAMAX) 70 MG tablet Take 1 tablet (70 mg) by mouth with 8oz water every 7 days 30 minutes before breakfast and remain upright during this time. 12 tablet 3     aspirin EC 81 MG EC tablet Take 1 tablet (81 mg) by mouth daily 90 tablet 3     atorvastatin (LIPITOR) 40 MG tablet Take 1 tablet (40 mg) by mouth daily 90 tablet 2     blood glucose monitoring (ONE TOUCH ULTRA 2) meter device kit Use to test blood sugars 3 times daily or as directed. 1 kit 0     blood glucose monitoring (ONE TOUCH ULTRA) test strip Use to test blood sugars 3 times daily or as directed. 3 Box 3     blood glucose monitoring (ONE TOUCH ULTRASOFT) lancets Use to test blood sugar 3 times daily or as directed. 100 each PRN     brimonidine (ALPHAGAN) 0.2 % ophthalmic solution Place 1 drop into the right eye 2 times daily 1 Bottle 11     calcium citrate-vitamin D (CITRACAL) 315-250 MG-UNIT TABS Take 2 tablets by mouth daily 120 tablet 5     cetirizine (ZYRTEC) 10 MG tablet Take 1 tablet (10 mg) by mouth every evening 30 tablet 3     Cholecalciferol (VITAMIN D) 2000 UNITS tablet Take 2,000 Units by mouth daily. 100 tablet 3     ciprofloxacin (CIPRO) 250 MG tablet Take 1 tablet (250 mg) by mouth 2 times daily 14 tablet 0     clotrimazole (LOTRIMIN) 1 % cream INSERT 1 APPLICATORFUL VAGINALLY TWICE A DAY FOR VAGINAL PAIN 12 g 0     CYANOCOBALAMIN PO Take 2,000 mcg by mouth daily       diclofenac (VOLTAREN) 1 % GEL topical gel Apply 2 g topically 4 times daily to hands 100 g 11     dorzolamide (TRUSOPT) 2 % ophthalmic solution Place 1 drop into the right eye 2 times daily 1 Bottle 11      EPINEPHrine (EPIPEN/ADRENACLICK/OR ANY BX GENERIC EQUIV) 0.3 MG/0.3ML injection 2-pack Inject 0.3 mLs (0.3 mg) into the muscle as needed for anaphylaxis 0.6 mL 1     escitalopram (LEXAPRO) 10 MG tablet Take 10 mg by mouth daily        ferrous sulfate (IRON) 325 (65 FE) MG tablet Take 1 tablet (325 mg) by mouth 2 times daily With meals 60 tablet 2     fluticasone (FLONASE) 50 MCG/ACT nasal spray Spray 1 spray into both nostrils daily       hydrochlorothiazide (MICROZIDE) 12.5 MG capsule Take 1 capsule (12.5 mg) by mouth daily 90 capsule 2     lactulose (CHRONULAC) 10 GM/15ML solution Take 20 g by mouth as needed for constipation       latanoprost (XALATAN) 0.005 % ophthalmic solution Place 1 drop into both eyes At Bedtime 2.5 mL 11     levETIRAcetam (KEPPRA) 500 MG tablet Take 1 tablet (500 mg) by mouth 2 times daily 180 tablet 1     lidocaine (LIDODERM) 5 % Patch APPLY 1 TO 3 PATCHES TO PAINFUL AREA AT ONCE FOR UP TO 12 HOURS WITHIN A 24 HOUR PERIOD REMOVE AFTER 12 HOURS 30 patch 5     lisinopril (PRINIVIL/ZESTRIL) 20 MG tablet TAKE 1 TABLET BY MOUTH DAILY DX:HYPERTENSION 90 tablet 3     metFORMIN (GLUCOPHAGE-XR) 500 MG 24 hr tablet Take 1 tablet (500 mg) by mouth daily (with dinner) 90 tablet 3     metoprolol succinate (TOPROL-XL) 50 MG 24 hr tablet TAKE 1 TABLET BY MOUTH DAILY 90 tablet 3     Multiple Vitamin (MULTIVITAMIN OR) Take 1 tablet by mouth daily        nicotine (NICODERM CQ) 14 MG/24HR 24 hr patch Place 1 patch onto the skin daily 30 patch 0     nitroglycerin (NITROSTAT) 0.4 MG SL tablet Place 1 tablet (0.4 mg) under the tongue every 5 minutes as needed for chest pain if you are still having symptoms after 3 doses (15 minutes) call 911. 25 tablet 1     ondansetron (ZOFRAN-ODT) 8 MG ODT tab Take 1 tablet (8 mg) by mouth every 8 hours as needed 30 tablet 5     order for DME Equipment being ordered: Power Wheel Chair 1 Device 0     order for DME Equipment being ordered: bandage tape, prefer paper 1 Package  0     order for DME Full electric hospital bed with half rails    Dx: F54319, I110, J449  Length of need: lifetime 1 Device 0     order for DME Wheel Chair Cushion: 18 x 18 inch Roho cushion 1 Device 0     order for DME Hospital bed with side rails 1 Device 0     order for DME Equipment being ordered: CPAP supplies mask, hose, filters, cushion    fax to [facility] at [phone] 10 Device prn     order for DME Equipment being ordered: CPAP supplies mask, hose, filters, cushion fax to [facility] at [phone]  Disp: 10 Device Refills: prn   Class: Local Print Start: 2/10/2017 1 Device 0     order for DME Equipment being ordered: Nebulizer and tubing supplies 1 Units 0     order for DME Equipment being ordered: Glucerna daily shakes with each meal 1 Box 11     ORDER FOR DME Equipment being ordered: Power Wheelchair 1 Device 0     ORDER FOR DME Equipment being ordered: Depends briefs 1 Month 11     pantoprazole (PROTONIX) 40 MG EC tablet Take 1 tablet (40 mg) by mouth daily 30 tablet 5     phenytoin 200 MG CAPS Take 200 mg by mouth 2 times daily 60 capsule 0     polyethylene glycol (MIRALAX/GLYCOLAX) Packet Take 17 g by mouth daily Dissolved in water or juice        pregabalin (LYRICA) 75 MG capsule Take 2 capsules (150 mg) by mouth 2 times daily 120 capsule 5     risperiDONE (RISPERDAL) 0.5 MG tablet Take 0.5 mg by mouth At Bedtime       sucralfate (CARAFATE) 1 GM tablet Take 1 tablet (1 g) by mouth 4 times daily May dissolve in 10 mL water is necessary. (Start upon completion of carafate suspension) 120 tablet 11     traZODone (DESYREL) 50 MG tablet Take 150 mg by mouth At Bedtime        umeclidinium (INCRUSE ELLIPTA) 62.5 MCG/INH oral inhaler Inhale 1 puff into the lungs daily       VENTOLIN  (90 BASE) MCG/ACT Inhaler Inhale 2 puffs into the lungs every 6 hours as needed for shortness of breath / dyspnea or wheezing 3 Inhaler 5     zinc 50 MG TABS Take 1 tablet by mouth daily       solifenacin  (VESICARE) 10 MG tablet Take 1 tablet (10 mg) by mouth daily 90 tablet 3     REVIEW OF SYSTEMS:  4 point ROS including Respiratory, CV, GI and , other than that noted in the HPI,  is negative    Objective:  /86   Pulse 82   Temp 98  F (36.7  C)   Resp 18   Wt 62.6 kg (138 lb)   SpO2 97%   BMI 52.47 kg/m     Exam:  GENERAL APPEARANCE:  Alert, in no distress  ENT:  Mouth and posterior oropharynx normal, moist mucous membranes  EYES:  EOM, conjunctivae, lids, pupils and irises normal  RESP:  No respiratory distress  M/S:   Active movement of bilateral upper extremities. 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  SKIN:  Inspection of skin and subcutaneous tissue baseline, Palpation of skin and subcutaneous tissue baseline  NEURO:   Cranial nerves 2-12 are normal tested and grossly at patient's baseline  PSYCH:  oriented to person and place    Labs:   Labs done in SNF are in [place] EPIC. Please refer to them using [employer] Everywhere.    ASSESSMENT/PLAN:  Advanced Directives, Counseling/Discussion. Confirms request for FULL CODE and aggressive measures despite previous documentation indicating more comfort focused. Completed new POLST form. Encouraged son, Kam, to discuss with patient in further detail so he is aware of her wishes.    Constipation. Added Senna-S to Miralax. Continue to monitor to determine if additional agent is necessary. Staff to use facility stand house orders for MOM or suppository as indicated.     Functional Incontinence S/P Suprapubic Catheter 1/16/18. Order written for harrell catheter change every 2 weeks per patient request. Given history of recurrent UTIs, may benefit from re-establishing care with Urology Clinic.     Chronic Pain Syndrome/Neuropathy. With history of intrathecal pain pump that was not covered by insurance while in [place] per PCP notes. Previously followed by [facility] in [place], now [facility]. Patient has left message with clinic to re-establish care. As patient exhibits no signs  of distress throughout visit, will not initiate narcotics at this time as patient has been weaned off all narcotics. Continue Acetaminophen, Diclofenac, Lidocaine Patch and Pregabalin as ordered.    Electronically signed by:  VICTOR M Mejia CNP             Sincerely,        VICTOR M Mejia CNP

## 2019-04-26 ENCOUNTER — TELEPHONE (OUTPATIENT)
Dept: PULMONOLOGY | Facility: CLINIC | Age: 69
End: 2019-04-26

## 2019-04-26 NOTE — TELEPHONE ENCOUNTER
[facility] Call Center    Phone Message    May a detailed message be left on voicemail: yes    Reason for Call: Other: Does pt need to have pft before appt with Dr Jennings on 5/20? Please call to discuss     Action Taken: Message routed to:  [facility]

## 2019-05-01 ENCOUNTER — TELEPHONE (OUTPATIENT)
Dept: INTERNAL MEDICINE | Facility: CLINIC | Age: 69
End: 2019-05-01

## 2019-05-01 ENCOUNTER — NURSING HOME VISIT (OUTPATIENT)
Dept: GERIATRICS | Facility: CLINIC | Age: 69
End: 2019-05-01
Payer: COMMERCIAL

## 2019-05-01 VITALS
BODY MASS INDEX: 52.28 KG/M2 | HEART RATE: 88 BPM | OXYGEN SATURATION: 98 % | WEIGHT: 137.5 LBS | TEMPERATURE: 97.5 F | RESPIRATION RATE: 18 BRPM | DIASTOLIC BLOOD PRESSURE: 69 MMHG | SYSTOLIC BLOOD PRESSURE: 113 MMHG

## 2019-05-01 DIAGNOSIS — G40.909 SEIZURE DISORDER (H): ICD-10-CM

## 2019-05-01 DIAGNOSIS — I73.9 PAD (PERIPHERAL ARTERY DISEASE) (H): Primary | ICD-10-CM

## 2019-05-01 DIAGNOSIS — G89.4 CHRONIC PAIN SYNDROME: ICD-10-CM

## 2019-05-01 DIAGNOSIS — I10 ESSENTIAL HYPERTENSION: ICD-10-CM

## 2019-05-01 DIAGNOSIS — F25.9 SCHIZOAFFECTIVE DISORDER, UNSPECIFIED TYPE (H): ICD-10-CM

## 2019-05-01 DIAGNOSIS — N32.81 OVERACTIVE BLADDER: ICD-10-CM

## 2019-05-01 DIAGNOSIS — G47.01 INSOMNIA DUE TO MEDICAL CONDITION: ICD-10-CM

## 2019-05-01 DIAGNOSIS — E11.59 TYPE 2 DIABETES MELLITUS WITH OTHER CIRCULATORY COMPLICATION, WITHOUT LONG-TERM CURRENT USE OF INSULIN (H): ICD-10-CM

## 2019-05-01 DIAGNOSIS — E78.5 HYPERLIPIDEMIA, UNSPECIFIED HYPERLIPIDEMIA TYPE: ICD-10-CM

## 2019-05-01 DIAGNOSIS — I70.229 CRITICAL LOWER LIMB ISCHEMIA (H): ICD-10-CM

## 2019-05-01 DIAGNOSIS — Z86.73 HISTORY OF CVA (CEREBROVASCULAR ACCIDENT): ICD-10-CM

## 2019-05-01 DIAGNOSIS — J44.9 CHRONIC OBSTRUCTIVE PULMONARY DISEASE, UNSPECIFIED COPD TYPE (H): ICD-10-CM

## 2019-05-01 DIAGNOSIS — I25.10 CORONARY ARTERY DISEASE INVOLVING NATIVE CORONARY ARTERY OF NATIVE HEART WITHOUT ANGINA PECTORIS: ICD-10-CM

## 2019-05-01 DIAGNOSIS — I50.32 CHRONIC DIASTOLIC HEART FAILURE (H): ICD-10-CM

## 2019-05-01 DIAGNOSIS — D50.9 IRON DEFICIENCY ANEMIA, UNSPECIFIED IRON DEFICIENCY ANEMIA TYPE: ICD-10-CM

## 2019-05-01 DIAGNOSIS — K59.01 SLOW TRANSIT CONSTIPATION: ICD-10-CM

## 2019-05-01 DIAGNOSIS — F17.200 TOBACCO DEPENDENCE SYNDROME: ICD-10-CM

## 2019-05-01 PROCEDURE — 99310 SBSQ NF CARE HIGH MDM 45: CPT | Performed by: INTERNAL MEDICINE

## 2019-05-01 RX ORDER — SENNOSIDES 8.6 MG
1 TABLET ORAL 2 TIMES DAILY
Status: ON HOLD | COMMUNITY
End: 2020-12-26

## 2019-05-01 NOTE — PROGRESS NOTES
[facility]  INITIAL VISIT NOTE  May 1, 2019    PRIMARY CARE PROVIDER AND CLINIC:  No Ref-Primary, Physician No address on file    Chief Complaint   Patient presents with     Hospital F/U       HPI:    Sonya Foote is a 69 year old  (1950) female who was seen at [facility] on May 1, 2019 for an initial visit. Medical history is notable for Medical history is notable for CVA w/ left sided hemiparesis, CAD s/p COLLEEN, diastolic CHF, DM, dysphagia/esophageal strictures s/p dilatation, chronic anemia, PAD s/p bilateral AKA, seizure disorder, upper GI bleed (Sept) and schizoaffective disorder. She has been living in [place] for about a year and a half, now back in [place] and needing to reestablish care and find long term living arrangement. She had been living with her sister in [place] and hopes to be able to move back into the [facility] from here. She is recently . Tells me she is needing some financial assistance to get worked out, but that they have an apartment for her on the third floor. Overall no specific concerns or questions today. She is a very good historian, knows who she needs to reestablish care with. No chest pain or dyspnea. No abdominal pain. Continues to smoke cigarettes and states this does bother her lungs, but she wishes to continue to smoke. Recently  and misses her spouse a great deal. Has kids and grandkids in town and is happy to be back close to them.     CODE STATUS:   CPR/Full code     ALLERGIES:     Allergies   Allergen Reactions     Bee Venom      Penicillins Anaphylaxis     Other reaction(s): Skin Rash and/or Hives     Dilantin [Phenytoin] Other (See Comments)     Generic dilantin only per pt     Iodide Hives     09/11/15: Pt states she can use iodine and doesn't have any problems      Iodine-131      Novocaine [Procaine] Hives     Other reaction(s): Skin Rash and/or Hives     Tositumomab        PAST MEDICAL HISTORY:   Past Medical History:    Diagnosis Date     Anemia      Antiplatelet or antithrombotic long-term use      Arthritis      AS (sickle cell trait) (H) 10/8/2013     Blind left eye      BPPV (benign paroxysmal positional vertigo)      Chronic back pain      COPD (chronic obstructive pulmonary disease) (H)      Coronary artery disease      CVA (cerebral infarction) 10/8/2012    x3, residual left sided weakness     Diastolic CHF (H) 9/4/2012     Dilantin toxicity 5/31/2013     Esophageal candidiasis (H)      GERD (gastroesophageal reflux disease)      Heart attack (H)      Hernia, abdominal      History of blood transfusion     x5     History of thrombophlebitis      HTN (hypertension) 9/4/2012     Hyperlipidemia LDL goal <100 9/4/2012     Legally blind in right eye, as defined in [place]     No periphal vision right eye     Osteoporosis 1/21/2013     Other chronic pain      PAD (peripheral artery disease) (H)      Palpitations      Person who has had sex change operation 1/20/2014     Pneumonia      Schizoaffective disorder (H)      Seizure disorder (H) 9/4/2012     Sleep apnea     CPAP     Thrombosis of leg      Type 2 diabetes, HbA1C goal < 8% (H) 9/4/2012     Uncomplicated asthma      Unspecified cerebral artery occlusion with cerebral infarction        PAST SURGICAL HISTORY:   Past Surgical History:   Procedure Laterality Date     AMPUTATE LEG ABOVE KNEE Left 6/11/2016    Procedure: AMPUTATE LEG ABOVE KNEE;  Surgeon: Mello Rodriguez MD;  [facility]     AMPUTATE LEG BELOW KNEE Right 11/7/2016    Procedure: AMPUTATE LEG BELOW KNEE;  Surgeon: Savannah Durant MD;  [facility]     AMPUTATE REVISION STUMP LOWER EXTREMITY Right 11/11/2016    Procedure: AMPUTATE REVISION STUMP LOWER EXTREMITY;  Surgeon: Savannah Durant MD;  [facility]     AMPUTATE REVISION STUMP LOWER EXTREMITY Right 11/16/2016    Procedure: AMPUTATE REVISION STUMP LOWER EXTREMITY;  Surgeon: Savannah Durant MD;  [facility]     AMPUTATE TOE(S) Right 1/5/2016     Procedure: AMPUTATE TOE(S);  Surgeon: Mello Gaines DPM;  Location:  [facility]     ANGIOGRAM Bilateral 11/21/2014    Procedure: ANGIOGRAM;  Surgeon: Savannah Durant MD;  [facility]     ANGIOGRAM Left 1/16/2015    Procedure: ANGIOGRAM;  Surgeon: Savannah Durant MD;  [facility]     ANGIOGRAM Bilateral 9/14/2015    Procedure: ANGIOGRAM;  Surgeon: Savannah Durant MD;  [facility]     ANGIOGRAM Left 10/12/2015    Procedure: ANGIOGRAM;  Surgeon: Savannah Durant MD;  [facility]     ANGIOGRAM Right 6/6/2016    Procedure: ANGIOGRAM;  Surgeon: Savannah Durant MD;  [facility]     ANGIOPLASTY Right 6/6/2016    Procedure: ANGIOPLASTY;  Surgeon: Savannah Durant MD;  [facility]     APPENDECTOMY       BREAST SURGERY      right breast bx (benign)     CATARACT IOL, RT/LT Right      CHOLECYSTECTOMY       COLONOSCOPY N/A 8/25/2014    Procedure: COLONOSCOPY;  Surgeon: Mello Ferrer MD;  [facility]     COLONOSCOPY WITH CO2 INSUFFLATION N/A 8/20/2014    Procedure: COLONOSCOPY WITH CO2 INSUFFLATION;  Surgeon: Duane, William Charles, MD;  Location: [facility]     CYSTOSTOMY, INSERT TUBE SUPRAPUBIC, COMBINED N/A 1/16/2018    Procedure: COMBINED CYSTOSTOMY, INSERT TUBE SUPRAPUBIC;  Cystoscopy, Intraoperative Ultrasound, Suprapubic Tube Placement;  Surgeon: Keanu Dawson MD;  [facility]     ENDARTERECTOMY FEMORAL  5/23/2014    Procedure: ENDARTERECTOMY FEMORAL;  Surgeon: Jason Joshi MD;  [facility]     ESOPHAGOSCOPY, GASTROSCOPY, DUODENOSCOPY (EGD), COMBINED  12/14/2012    Procedure: COMBINED ESOPHAGOSCOPY, GASTROSCOPY, DUODENOSCOPY (EGD), BIOPSY SINGLE OR MULTIPLE;  ESOPHAGOSCOPY, GASTROSCOPY, DUODENOSCOPY (EGD), DILATATION ;  Surgeon: Elizabeth Stevenson MD;  Location:  GI     ESOPHAGOSCOPY, GASTROSCOPY, DUODENOSCOPY (EGD), COMBINED  12/31/2013    Procedure: COMBINED ESOPHAGOSCOPY, GASTROSCOPY, DUODENOSCOPY (EGD), BIOPSY SINGLE OR MULTIPLE;;  Surgeon: Clemente Lopez MD;  [facility]      ESOPHAGOSCOPY, GASTROSCOPY, DUODENOSCOPY (EGD), COMBINED  4/1/2014    Procedure: COMBINED ESOPHAGOSCOPY, GASTROSCOPY, DUODENOSCOPY (EGD);;  Surgeon: Clemente Lopez MD;  [facility]     ESOPHAGOSCOPY, GASTROSCOPY, DUODENOSCOPY (EGD), COMBINED  6/28/2014    Procedure: COMBINED ESOPHAGOSCOPY, GASTROSCOPY, DUODENOSCOPY (EGD);  Surgeon: Clemente Lopez MD;  [facility]     ESOPHAGOSCOPY, GASTROSCOPY, DUODENOSCOPY (EGD), COMBINED N/A 8/20/2014    Procedure: COMBINED ESOPHAGOSCOPY, GASTROSCOPY, DUODENOSCOPY (EGD), BIOPSY SINGLE OR MULTIPLE;  Surgeon: Duane, William Charles, MD;  Location:  OR     ESOPHAGOSCOPY, GASTROSCOPY, DUODENOSCOPY (EGD), COMBINED N/A 8/22/2014    Procedure: COMBINED ESOPHAGOSCOPY, GASTROSCOPY, DUODENOSCOPY (EGD), BIOPSY SINGLE OR MULTIPLE;  Surgeon: Mello Ferrer MD;  [facility]     ESOPHAGOSCOPY, GASTROSCOPY, DUODENOSCOPY (EGD), COMBINED N/A 10/2/2014    Procedure: COMBINED ESOPHAGOSCOPY, GASTROSCOPY, DUODENOSCOPY (EGD), BIOPSY SINGLE OR MULTIPLE;  Surgeon: Remy Haskins MD;  Location:  GI     ESOPHAGOSCOPY, GASTROSCOPY, DUODENOSCOPY (EGD), COMBINED Left 12/15/2014    Procedure: COMBINED ESOPHAGOSCOPY, GASTROSCOPY, DUODENOSCOPY (EGD), BIOPSY SINGLE OR MULTIPLE;  Surgeon: Remy Haskins MD;  [facility]     ESOPHAGOSCOPY, GASTROSCOPY, DUODENOSCOPY (EGD), COMBINED N/A 2/25/2015    Procedure: COMBINED ENDOSCOPIC ULTRASOUND, ESOPHAGOSCOPY, GASTROSCOPY, DUODENOSCOPY (EGD), FINE NEEDLE ASPIRATE/BIOPSY;  Surgeon: Clemente Lugo MD;  [facility]     ESOPHAGOSCOPY, GASTROSCOPY, DUODENOSCOPY (EGD), COMBINED Left 2/25/2015    Procedure: COMBINED ESOPHAGOSCOPY, GASTROSCOPY, DUODENOSCOPY (EGD), BIOPSY SINGLE OR MULTIPLE;  Surgeon: Clemente Lugo MD;  [facility]     ESOPHAGOSCOPY, GASTROSCOPY, DUODENOSCOPY (EGD), COMBINED N/A 9/25/2016    Procedure: COMBINED ESOPHAGOSCOPY, GASTROSCOPY, DUODENOSCOPY (EGD);  Surgeon: Aziza Patiño MD;  Location:   GI     ESOPHAGOSCOPY, GASTROSCOPY, DUODENOSCOPY (EGD), COMBINED N/A 1/18/2017    Procedure: COMBINED ESOPHAGOSCOPY, GASTROSCOPY, DUODENOSCOPY (EGD), BIOPSY SINGLE OR MULTIPLE;  Surgeon: Clemente Lopez MD;  [facility]     ESOPHAGOSCOPY, GASTROSCOPY, DUODENOSCOPY (EGD), COMBINED N/A 11/26/2017    Procedure: COMBINED ESOPHAGOSCOPY, GASTROSCOPY, DUODENOSCOPY (EGD), REMOVE FOREIGN BODY;  Esophagogastroduodenoscopy with foreign body extraction  ;  Surgeon: Herberth Castrejon MD;  [facility]     ESOPHAGOSCOPY, GASTROSCOPY, DUODENOSCOPY (EGD), COMBINED N/A 11/26/2017    Procedure: COMBINED ESOPHAGOSCOPY, GASTROSCOPY, DUODENOSCOPY (EGD), REMOVE FOREIGN BODY;;  Surgeon: Herberth Castrejon MD;  [facility]     FASCIOTOMY LOWER EXTREMITY Left 6/10/2016    Procedure: FASCIOTOMY LOWER EXTREMITY;  Surgeon: Mello Rodriguez MD;  [facility]     HC CAPSULE ENDOSCOPY N/A 8/25/2014    Procedure: CAPSULE/PILL CAM ENDOSCOPY;  Surgeon: Remy Haskins MD;  [facility]     HC CAPSULE ENDOSCOPY N/A 10/2/2014    Procedure: CAPSULE/PILL CAM ENDOSCOPY;  Surgeon: Remy Haskins MD;  [facility]     ORTHOPEDIC SURGERY      broken wrist repair     SEX TRANSFORMATION SURGERY, MALE TO FEMALE      1974     SINUS SURGERY      cyst removed     TONSILLECTOMY       VASCULAR SURGERY      Left carotid stent       FAMILY HISTORY:   Family History   Problem Relation Age of Onset     Dementia Mother      Glaucoma Mother      Diabetes Mother         may have been type 1, childhood     Coronary Artery Disease Mother         MI     Glaucoma Father      Diabetes Father         may hev been type 1 - ??     Heart Failure Father      Cancer Maternal Aunt         leukemia     Schizophrenia Brother      Depression Brother      Suicide Sister      Depression Sister      Diabetes Sister      Cancer Maternal Aunt         ovarian     Glaucoma Maternal Grandmother      Diabetes Maternal Grandmother      Glaucoma Maternal  Grandfather      Diabetes Maternal Grandfather      Glaucoma Paternal Grandmother      Diabetes Paternal Grandmother      Glaucoma Paternal Grandfather      Diabetes Paternal Grandfather      Breast Cancer Sister      Cerebrovascular Disease Brother      Colon Cancer No family hx of      Macular Degeneration No family hx of        SOCIAL HISTORY:   Lives in [facility]    MEDICATIONS:  Current Outpatient Medications   Medication Sig Dispense Refill     ACETAMINOPHEN PO Take 1,000 mg by mouth every 6 hours as needed for pain Not to exceed 4000 mg/day       ADVAIR DISKUS 250-50 MCG/DOSE diskus inhaler Inhale 1 puff into the lungs 2 times daily 60 Inhaler 11     albuterol (2.5 MG/3ML) 0.083% neb solution INHALE 1 VIAL VIA NEBULIZER EVERY 6 HOURS AS NEEDED 360 mL 11     alendronate (FOSAMAX) 70 MG tablet Take 1 tablet (70 mg) by mouth with 8oz water every 7 days 30 minutes before breakfast and remain upright during this time. 12 tablet 3     aspirin EC 81 MG EC tablet Take 1 tablet (81 mg) by mouth daily 90 tablet 3     atorvastatin (LIPITOR) 40 MG tablet Take 1 tablet (40 mg) by mouth daily 90 tablet 2     blood glucose monitoring (ONE TOUCH ULTRA 2) meter device kit Use to test blood sugars 3 times daily or as directed. 1 kit 0     blood glucose monitoring (ONE TOUCH ULTRA) test strip Use to test blood sugars 3 times daily or as directed. 3 Box 3     blood glucose monitoring (ONE TOUCH ULTRASOFT) lancets Use to test blood sugar 3 times daily or as directed. 100 each PRN     brimonidine (ALPHAGAN) 0.2 % ophthalmic solution Place 1 drop into the right eye 2 times daily 1 Bottle 11     calcium citrate-vitamin D (CITRACAL) 315-250 MG-UNIT TABS Take 2 tablets by mouth daily 120 tablet 5     cetirizine (ZYRTEC) 10 MG tablet Take 1 tablet (10 mg) by mouth every evening 30 tablet 3     Cholecalciferol (VITAMIN D) 2000 UNITS tablet Take 2,000 Units by mouth daily. 100 tablet 3     clotrimazole (LOTRIMIN) 1 % cream INSERT 1  APPLICATORFUL VAGINALLY TWICE A DAY FOR VAGINAL PAIN 12 g 0     CYANOCOBALAMIN PO Take 2,000 mcg by mouth daily       diclofenac (VOLTAREN) 1 % GEL topical gel Apply 2 g topically 4 times daily to hands 100 g 11     dorzolamide (TRUSOPT) 2 % ophthalmic solution Place 1 drop into the right eye 2 times daily 1 Bottle 11     EPINEPHrine (EPIPEN/ADRENACLICK/OR ANY BX GENERIC EQUIV) 0.3 MG/0.3ML injection 2-pack Inject 0.3 mLs (0.3 mg) into the muscle as needed for anaphylaxis 0.6 mL 1     escitalopram (LEXAPRO) 10 MG tablet Take 10 mg by mouth daily        ferrous sulfate (IRON) 325 (65 FE) MG tablet Take 1 tablet (325 mg) by mouth 2 times daily With meals 60 tablet 2     fluticasone (FLONASE) 50 MCG/ACT nasal spray Spray 1 spray into both nostrils daily       hydrochlorothiazide (MICROZIDE) 12.5 MG capsule Take 1 capsule (12.5 mg) by mouth daily 90 capsule 2     lactulose (CHRONULAC) 10 GM/15ML solution Take 20 g by mouth as needed for constipation       latanoprost (XALATAN) 0.005 % ophthalmic solution Place 1 drop into both eyes At Bedtime 2.5 mL 11     levETIRAcetam (KEPPRA) 500 MG tablet Take 1 tablet (500 mg) by mouth 2 times daily 180 tablet 1     lidocaine (LIDODERM) 5 % Patch APPLY 1 TO 3 PATCHES TO PAINFUL AREA AT ONCE FOR UP TO 12 HOURS WITHIN A 24 HOUR PERIOD REMOVE AFTER 12 HOURS 30 patch 5     lisinopril (PRINIVIL/ZESTRIL) 20 MG tablet TAKE 1 TABLET BY MOUTH DAILY DX:HYPERTENSION 90 tablet 3     metFORMIN (GLUCOPHAGE-XR) 500 MG 24 hr tablet Take 1 tablet (500 mg) by mouth daily (with dinner) 90 tablet 3     metoprolol succinate (TOPROL-XL) 50 MG 24 hr tablet TAKE 1 TABLET BY MOUTH DAILY 90 tablet 3     Multiple Vitamin (MULTIVITAMIN OR) Take 1 tablet by mouth daily        nitroglycerin (NITROSTAT) 0.4 MG SL tablet Place 1 tablet (0.4 mg) under the tongue every 5 minutes as needed for chest pain if you are still having symptoms after 3 doses (15 minutes) call 911. 25 tablet 1     ondansetron (ZOFRAN-ODT) 8  MG ODT tab Take 1 tablet (8 mg) by mouth every 8 hours as needed 30 tablet 5     order for DME Equipment being ordered: Power Wheel Chair 1 Device 0     order for DME Equipment being ordered: bandage tape, prefer paper 1 Package 0     order for DME Full electric hospital bed with half rails    Dx: H21803, I110, J449  Length of need: lifetime 1 Device 0     order for DME Wheel Chair Cushion: 18 x 18 inch Roho cushion 1 Device 0     order for DME Hospital bed with side rails 1 Device 0     order for DME Equipment being ordered: CPAP supplies mask, hose, filters, cushion    fax to [facility] at [phone] 10 Device prn     order for DME Equipment being ordered: CPAP supplies mask, hose, filters, cushion fax to [facility] at [phone]  Disp: 10 Device Refills: prn   Class: Local Print Start: 2/10/2017 1 Device 0     order for DME Equipment being ordered: Nebulizer and tubing supplies 1 Units 0     order for DME Equipment being ordered: Glucerna daily shakes with each meal 1 Box 11     ORDER FOR DME Equipment being ordered: Power Wheelchair 1 Device 0     ORDER FOR DME Equipment being ordered: Depends briefs 1 Month 11     pantoprazole (PROTONIX) 40 MG EC tablet Take 1 tablet (40 mg) by mouth daily 30 tablet 5     phenytoin 200 MG CAPS Take 200 mg by mouth 2 times daily 60 capsule 0     polyethylene glycol (MIRALAX/GLYCOLAX) Packet Take 17 g by mouth daily Dissolved in water or juice        pregabalin (LYRICA) 75 MG capsule Take 2 capsules (150 mg) by mouth 2 times daily 120 capsule 5     risperiDONE (RISPERDAL) 0.5 MG tablet Take 0.5 mg by mouth At Bedtime       sennosides (SENOKOT) 8.6 MG tablet Take 1 tablet by mouth 2 times daily       solifenacin (VESICARE) 10 MG tablet Take 1 tablet (10 mg) by mouth daily 90 tablet 3     sucralfate (CARAFATE) 1 GM tablet Take 1 tablet (1 g) by mouth 4 times daily May dissolve in 10 mL water is necessary. (Start upon completion of carafate suspension) 120 tablet 11      traZODone (DESYREL) 50 MG tablet Take 150 mg by mouth At Bedtime        umeclidinium (INCRUSE ELLIPTA) 62.5 MCG/INH oral inhaler Inhale 1 puff into the lungs daily       VENTOLIN  (90 BASE) MCG/ACT Inhaler Inhale 2 puffs into the lungs every 6 hours as needed for shortness of breath / dyspnea or wheezing 3 Inhaler 5     zinc 50 MG TABS Take 1 tablet by mouth daily           ROS:  10 point ROS neg other than the symptoms noted above in the HPI.    PHYSICAL EXAM:  /69   Pulse 88   Temp 97.5  F (36.4  C)   Resp 18   Wt 62.4 kg (137 lb 8 oz)   SpO2 98%   BMI 52.28 kg/m     Gen: sitting in bed, alert, cooperative and in no acute distress  HEENT: normocephalic; oropharynx clear  Card: RRR, S1, S2, no murmurs  Resp: lungs clear to auscultation bilaterally  GI: abdomen soft, not-tender  MSK: normal muscle tone, s/p bilateral AKA  Neuro: CX II-XII grossly in tact; ROM in upper extremities grossly in tact  Psych: alert and oriented x3; normal affect    LABORATORY/IMAGING DATA:  Reviewed as per Epic    ASSESSMENT/PLAN:    Peripheral Vascular Disease s/p Bilateral AKA  -- continues on ASA 81 mg daily and atorvastatin 40 mg daily     CAD s/p COLLEEN x2 to RCA (Sept 2016) / Diastolic CHF / HTN / HLD  SBPs 110s-140s with limited data. No chest pain.   -- continues on ASA 81 mg daily, atorvastatin 40 mg daily, HCTZ 12.5 mg daily, lisinopril 20 mg daily and metoprolol XL 50 mg daily   -- follow BPs and adjust medications as needed    Fe Deficiency Anemia  -- continues on FeSO4 325 mg BID     Esophagitis / Gastritis  -- continues on pantoprazole 40 mg daily and sucralfate 1g QID     CVA  By history  -- continues on ASA 81 mg daily and atorvastatin 40 mg daily    Seizure Disorder  In setting of prior CVA.   -- continues on levetiracetam 500 mg BID and phenytoin 200 mg BID     DM, Type II  Sugars 90s-110s overall.   -- continues on metformin 500 mg daily     Esophageal Stricture s/p Dilatation (9/6/16)  Dysphagia  --  continues on NDD3 with thin liquids     Schizoaffective Disorder  -- continues on escitalopram 10 mg daily and risperidone 0.5 mg at bedtime   -- supportive cares    Chronic Back Pain  Chronic Pain Syndrome  Historically has followed with [facility]. Has an intrathecal fentanyl/bupivicaine pump. She is working to re-establish care with former pain clinic  -- continues on pregabalin 150 mg BID as well as diclofenac gel and lidoderm patch     COPD  No signs of exacerbation.   -- continues on Advair 250-50 mg BID, umeclidinium 62.5 mcg daily and PRN albuterol nebs and MDI    Overactive Bladder  -- continues on solifenacin 10 mg daily    Tobacco Dependence  Continues to smoke cigarettes. Acknowledges this makes her breathing more difficult, but no plans to quit.   -- continue to encourage cessation      Insomnia  -- continues on trazodone 150 mg at bedtime    Osteoporosis   -- continues on alendronate 70 mg weekly     Slow Transit Constipation   -- continues on Miralax 17 g daily, lactulose 20 g daily PRN and senna 1 tab BID  -- adjust bowel regimen as needed       FGS TIME SPENT: Total time spent with patient visit at the [facility] was 35 min including patient visit, review of past records and discussion with NP. Greater than 50% of total time spent with counseling and coordinating care due to complex patient, multiple co morbids with multiple subspecalists involved, recently moved back to [place] and needs to restablish care    Electronically signed by:  Cary Harden MD

## 2019-05-01 NOTE — LETTER
5/1/2019        RE: Sonya Foote  [address] [facility] [address]  [facility] [address]        [facility]  INITIAL VISIT NOTE  May 1, 2019    PRIMARY CARE PROVIDER AND CLINIC:  No Ref-Primary, Physician No address on file    Chief Complaint   Patient presents with     Hospital F/U       HPI:    Sonya Foote is a 69 year old  (1950) female who was seen at [facility] on May 1, 2019 for an initial visit. Medical history is notable for Medical history is notable for CVA w/ left sided hemiparesis, CAD s/p COLLEEN, diastolic CHF, DM, dysphagia/esophageal strictures s/p dilatation, chronic anemia, PAD s/p bilateral AKA, seizure disorder, upper GI bleed (Sept) and schizoaffective disorder. She has been living in [place] for about a year and a half, now back in [place] and needing to reestablish care and find long term living arrangement. She had been living with her sister in [place] and hopes to be able to move back into the [facility] from here. She is recently . Tells me she is needing some financial assistance to get worked out, but that they have an apartment for her on the third floor. Overall no specific concerns or questions today. She is a very good historian, knows who she needs to reestablish care with. No chest pain or dyspnea. No abdominal pain. Continues to smoke cigarettes and states this does bother her lungs, but she wishes to continue to smoke. Recently  and misses her spouse a great deal. Has kids and grandkids in town and is happy to be back close to them.     CODE STATUS:   CPR/Full code     ALLERGIES:     Allergies   Allergen Reactions     Bee Venom      Penicillins Anaphylaxis     Other reaction(s): Skin Rash and/or Hives     Dilantin [Phenytoin] Other (See Comments)     Generic dilantin only per pt     Iodide Hives     09/11/15: Pt states she can use iodine and doesn't have any problems      Iodine-131      Novocaine [Procaine] Hives     Other  reaction(s): Skin Rash and/or Hives     Tositumomab        PAST MEDICAL HISTORY:   Past Medical History:   Diagnosis Date     Anemia      Antiplatelet or antithrombotic long-term use      Arthritis      AS (sickle cell trait) (H) 10/8/2013     Blind left eye      BPPV (benign paroxysmal positional vertigo)      Chronic back pain      COPD (chronic obstructive pulmonary disease) (H)      Coronary artery disease      CVA (cerebral infarction) 10/8/2012    x3, residual left sided weakness     Diastolic CHF (H) 9/4/2012     Dilantin toxicity 5/31/2013     Esophageal candidiasis (H)      GERD (gastroesophageal reflux disease)      Heart attack (H)      Hernia, abdominal      History of blood transfusion     x5     History of thrombophlebitis      HTN (hypertension) 9/4/2012     Hyperlipidemia LDL goal <100 9/4/2012     Legally blind in right eye, as defined in [place]     No periphal vision right eye     Osteoporosis 1/21/2013     Other chronic pain      PAD (peripheral artery disease) (H)      Palpitations      Person who has had sex change operation 1/20/2014     Pneumonia      Schizoaffective disorder (H)      Seizure disorder (H) 9/4/2012     Sleep apnea     CPAP     Thrombosis of leg      Type 2 diabetes, HbA1C goal < 8% (H) 9/4/2012     Uncomplicated asthma      Unspecified cerebral artery occlusion with cerebral infarction        PAST SURGICAL HISTORY:   Past Surgical History:   Procedure Laterality Date     AMPUTATE LEG ABOVE KNEE Left 6/11/2016    Procedure: AMPUTATE LEG ABOVE KNEE;  Surgeon: Mello Rodriguez MD;  [facility]     AMPUTATE LEG BELOW KNEE Right 11/7/2016    Procedure: AMPUTATE LEG BELOW KNEE;  Surgeon: Savannah Durant MD;  [facility]     AMPUTATE REVISION STUMP LOWER EXTREMITY Right 11/11/2016    Procedure: AMPUTATE REVISION STUMP LOWER EXTREMITY;  Surgeon: Savannah Durant MD;  [facility]     AMPUTATE REVISION STUMP LOWER EXTREMITY Right 11/16/2016    Procedure: AMPUTATE REVISION STUMP  LOWER EXTREMITY;  Surgeon: Savannah Durant MD;  [facility]     AMPUTATE TOE(S) Right 1/5/2016    Procedure: AMPUTATE TOE(S);  Surgeon: Mello Gaines DPM;  Location:  [facility]     ANGIOGRAM Bilateral 11/21/2014    Procedure: ANGIOGRAM;  Surgeon: Savannah Durant MD;  [facility]     ANGIOGRAM Left 1/16/2015    Procedure: ANGIOGRAM;  Surgeon: Savannah Durant MD;  [facility]     ANGIOGRAM Bilateral 9/14/2015    Procedure: ANGIOGRAM;  Surgeon: Savannah Durant MD;  [facility]     ANGIOGRAM Left 10/12/2015    Procedure: ANGIOGRAM;  Surgeon: Savannah Durant MD;  [facility]     ANGIOGRAM Right 6/6/2016    Procedure: ANGIOGRAM;  Surgeon: Savannah Durant MD;  [facility]     ANGIOPLASTY Right 6/6/2016    Procedure: ANGIOPLASTY;  Surgeon: Savannah Durant MD;  [facility]     APPENDECTOMY       BREAST SURGERY      right breast bx (benign)     CATARACT IOL, RT/LT Right      CHOLECYSTECTOMY       COLONOSCOPY N/A 8/25/2014    Procedure: COLONOSCOPY;  Surgeon: Mello Ferrer MD;  [facility]     COLONOSCOPY WITH CO2 INSUFFLATION N/A 8/20/2014    Procedure: COLONOSCOPY WITH CO2 INSUFFLATION;  Surgeon: Duane, William Charles, MD;  Location: [facility]     CYSTOSTOMY, INSERT TUBE SUPRAPUBIC, COMBINED N/A 1/16/2018    Procedure: COMBINED CYSTOSTOMY, INSERT TUBE SUPRAPUBIC;  Cystoscopy, Intraoperative Ultrasound, Suprapubic Tube Placement;  Surgeon: Keanu Dawson MD;  [facility]     ENDARTERECTOMY FEMORAL  5/23/2014    Procedure: ENDARTERECTOMY FEMORAL;  Surgeon: Jason Joshi MD;  [facility]     ESOPHAGOSCOPY, GASTROSCOPY, DUODENOSCOPY (EGD), COMBINED  12/14/2012    Procedure: COMBINED ESOPHAGOSCOPY, GASTROSCOPY, DUODENOSCOPY (EGD), BIOPSY SINGLE OR MULTIPLE;  ESOPHAGOSCOPY, GASTROSCOPY, DUODENOSCOPY (EGD), DILATATION ;  Surgeon: Elizabeth Stevenson MD;  Location:  GI     ESOPHAGOSCOPY, GASTROSCOPY, DUODENOSCOPY (EGD), COMBINED  12/31/2013    Procedure: COMBINED ESOPHAGOSCOPY,  GASTROSCOPY, DUODENOSCOPY (EGD), BIOPSY SINGLE OR MULTIPLE;;  Surgeon: Clemente Lopez MD;  [facility]     ESOPHAGOSCOPY, GASTROSCOPY, DUODENOSCOPY (EGD), COMBINED  4/1/2014    Procedure: COMBINED ESOPHAGOSCOPY, GASTROSCOPY, DUODENOSCOPY (EGD);;  Surgeon: Clemente Lopez MD;  [facility]     ESOPHAGOSCOPY, GASTROSCOPY, DUODENOSCOPY (EGD), COMBINED  6/28/2014    Procedure: COMBINED ESOPHAGOSCOPY, GASTROSCOPY, DUODENOSCOPY (EGD);  Surgeon: Clemente Lopez MD;  [facility]     ESOPHAGOSCOPY, GASTROSCOPY, DUODENOSCOPY (EGD), COMBINED N/A 8/20/2014    Procedure: COMBINED ESOPHAGOSCOPY, GASTROSCOPY, DUODENOSCOPY (EGD), BIOPSY SINGLE OR MULTIPLE;  Surgeon: Duane, William Charles, MD;  Location:  OR     ESOPHAGOSCOPY, GASTROSCOPY, DUODENOSCOPY (EGD), COMBINED N/A 8/22/2014    Procedure: COMBINED ESOPHAGOSCOPY, GASTROSCOPY, DUODENOSCOPY (EGD), BIOPSY SINGLE OR MULTIPLE;  Surgeon: Mello Ferrer MD;  [facility]     ESOPHAGOSCOPY, GASTROSCOPY, DUODENOSCOPY (EGD), COMBINED N/A 10/2/2014    Procedure: COMBINED ESOPHAGOSCOPY, GASTROSCOPY, DUODENOSCOPY (EGD), BIOPSY SINGLE OR MULTIPLE;  Surgeon: Remy Haskins MD;  [facility]     ESOPHAGOSCOPY, GASTROSCOPY, DUODENOSCOPY (EGD), COMBINED Left 12/15/2014    Procedure: COMBINED ESOPHAGOSCOPY, GASTROSCOPY, DUODENOSCOPY (EGD), BIOPSY SINGLE OR MULTIPLE;  Surgeon: Remy Haskins MD;  [facility]     ESOPHAGOSCOPY, GASTROSCOPY, DUODENOSCOPY (EGD), COMBINED N/A 2/25/2015    Procedure: COMBINED ENDOSCOPIC ULTRASOUND, ESOPHAGOSCOPY, GASTROSCOPY, DUODENOSCOPY (EGD), FINE NEEDLE ASPIRATE/BIOPSY;  Surgeon: Clemente Lugo MD;  [facility]     ESOPHAGOSCOPY, GASTROSCOPY, DUODENOSCOPY (EGD), COMBINED Left 2/25/2015    Procedure: COMBINED ESOPHAGOSCOPY, GASTROSCOPY, DUODENOSCOPY (EGD), BIOPSY SINGLE OR MULTIPLE;  Surgeon: Clemente Lugo MD;  Location:  GI     ESOPHAGOSCOPY, GASTROSCOPY, DUODENOSCOPY (EGD), COMBINED N/A 9/25/2016     Procedure: COMBINED ESOPHAGOSCOPY, GASTROSCOPY, DUODENOSCOPY (EGD);  Surgeon: Aziza Patiño MD;  [facility]     ESOPHAGOSCOPY, GASTROSCOPY, DUODENOSCOPY (EGD), COMBINED N/A 1/18/2017    Procedure: COMBINED ESOPHAGOSCOPY, GASTROSCOPY, DUODENOSCOPY (EGD), BIOPSY SINGLE OR MULTIPLE;  Surgeon: Clemente Lopez MD;  [facility]     ESOPHAGOSCOPY, GASTROSCOPY, DUODENOSCOPY (EGD), COMBINED N/A 11/26/2017    Procedure: COMBINED ESOPHAGOSCOPY, GASTROSCOPY, DUODENOSCOPY (EGD), REMOVE FOREIGN BODY;  Esophagogastroduodenoscopy with foreign body extraction  ;  Surgeon: Herberth Castrejon MD;  [facility]     ESOPHAGOSCOPY, GASTROSCOPY, DUODENOSCOPY (EGD), COMBINED N/A 11/26/2017    Procedure: COMBINED ESOPHAGOSCOPY, GASTROSCOPY, DUODENOSCOPY (EGD), REMOVE FOREIGN BODY;;  Surgeon: Herberth Castrejon MD;  [facility]     FASCIOTOMY LOWER EXTREMITY Left 6/10/2016    Procedure: FASCIOTOMY LOWER EXTREMITY;  Surgeon: Mello Rodriguez MD;  [facility]     HC CAPSULE ENDOSCOPY N/A 8/25/2014    Procedure: CAPSULE/PILL CAM ENDOSCOPY;  Surgeon: Remy Haskins MD;  [facility]     HC CAPSULE ENDOSCOPY N/A 10/2/2014    Procedure: CAPSULE/PILL CAM ENDOSCOPY;  Surgeon: Remy Haskins MD;  [facility]     ORTHOPEDIC SURGERY      broken wrist repair     SEX TRANSFORMATION SURGERY, MALE TO FEMALE      1974     SINUS SURGERY      cyst removed     TONSILLECTOMY       VASCULAR SURGERY      Left carotid stent       FAMILY HISTORY:   Family History   Problem Relation Age of Onset     Dementia Mother      Glaucoma Mother      Diabetes Mother         may have been type 1, childhood     Coronary Artery Disease Mother         MI     Glaucoma Father      Diabetes Father         may hev been type 1 - ??     Heart Failure Father      Cancer Maternal Aunt         leukemia     Schizophrenia Brother      Depression Brother      Suicide Sister      Depression Sister      Diabetes Sister      Cancer Maternal  Aunt         ovarian     Glaucoma Maternal Grandmother      Diabetes Maternal Grandmother      Glaucoma Maternal Grandfather      Diabetes Maternal Grandfather      Glaucoma Paternal Grandmother      Diabetes Paternal Grandmother      Glaucoma Paternal Grandfather      Diabetes Paternal Grandfather      Breast Cancer Sister      Cerebrovascular Disease Brother      Colon Cancer No family hx of      Macular Degeneration No family hx of        SOCIAL HISTORY:   Lives in     MEDICATIONS:  Current Outpatient Medications   Medication Sig Dispense Refill     ACETAMINOPHEN PO Take 1,000 mg by mouth every 6 hours as needed for pain Not to exceed 4000 mg/day       ADVAIR DISKUS 250-50 MCG/DOSE diskus inhaler Inhale 1 puff into the lungs 2 times daily 60 Inhaler 11     albuterol (2.5 MG/3ML) 0.083% neb solution INHALE 1 VIAL VIA NEBULIZER EVERY 6 HOURS AS NEEDED 360 mL 11     alendronate (FOSAMAX) 70 MG tablet Take 1 tablet (70 mg) by mouth with 8oz water every 7 days 30 minutes before breakfast and remain upright during this time. 12 tablet 3     aspirin EC 81 MG EC tablet Take 1 tablet (81 mg) by mouth daily 90 tablet 3     atorvastatin (LIPITOR) 40 MG tablet Take 1 tablet (40 mg) by mouth daily 90 tablet 2     blood glucose monitoring (ONE TOUCH ULTRA 2) meter device kit Use to test blood sugars 3 times daily or as directed. 1 kit 0     blood glucose monitoring (ONE TOUCH ULTRA) test strip Use to test blood sugars 3 times daily or as directed. 3 Box 3     blood glucose monitoring (ONE TOUCH ULTRASOFT) lancets Use to test blood sugar 3 times daily or as directed. 100 each PRN     brimonidine (ALPHAGAN) 0.2 % ophthalmic solution Place 1 drop into the right eye 2 times daily 1 Bottle 11     calcium citrate-vitamin D (CITRACAL) 315-250 MG-UNIT TABS Take 2 tablets by mouth daily 120 tablet 5     cetirizine (ZYRTEC) 10 MG tablet Take 1 tablet (10 mg) by mouth every evening 30 tablet 3     Cholecalciferol (VITAMIN D) 2000  UNITS tablet Take 2,000 Units by mouth daily. 100 tablet 3     clotrimazole (LOTRIMIN) 1 % cream INSERT 1 APPLICATORFUL VAGINALLY TWICE A DAY FOR VAGINAL PAIN 12 g 0     CYANOCOBALAMIN PO Take 2,000 mcg by mouth daily       diclofenac (VOLTAREN) 1 % GEL topical gel Apply 2 g topically 4 times daily to hands 100 g 11     dorzolamide (TRUSOPT) 2 % ophthalmic solution Place 1 drop into the right eye 2 times daily 1 Bottle 11     EPINEPHrine (EPIPEN/ADRENACLICK/OR ANY BX GENERIC EQUIV) 0.3 MG/0.3ML injection 2-pack Inject 0.3 mLs (0.3 mg) into the muscle as needed for anaphylaxis 0.6 mL 1     escitalopram (LEXAPRO) 10 MG tablet Take 10 mg by mouth daily        ferrous sulfate (IRON) 325 (65 FE) MG tablet Take 1 tablet (325 mg) by mouth 2 times daily With meals 60 tablet 2     fluticasone (FLONASE) 50 MCG/ACT nasal spray Spray 1 spray into both nostrils daily       hydrochlorothiazide (MICROZIDE) 12.5 MG capsule Take 1 capsule (12.5 mg) by mouth daily 90 capsule 2     lactulose (CHRONULAC) 10 GM/15ML solution Take 20 g by mouth as needed for constipation       latanoprost (XALATAN) 0.005 % ophthalmic solution Place 1 drop into both eyes At Bedtime 2.5 mL 11     levETIRAcetam (KEPPRA) 500 MG tablet Take 1 tablet (500 mg) by mouth 2 times daily 180 tablet 1     lidocaine (LIDODERM) 5 % Patch APPLY 1 TO 3 PATCHES TO PAINFUL AREA AT ONCE FOR UP TO 12 HOURS WITHIN A 24 HOUR PERIOD REMOVE AFTER 12 HOURS 30 patch 5     lisinopril (PRINIVIL/ZESTRIL) 20 MG tablet TAKE 1 TABLET BY MOUTH DAILY DX:HYPERTENSION 90 tablet 3     metFORMIN (GLUCOPHAGE-XR) 500 MG 24 hr tablet Take 1 tablet (500 mg) by mouth daily (with dinner) 90 tablet 3     metoprolol succinate (TOPROL-XL) 50 MG 24 hr tablet TAKE 1 TABLET BY MOUTH DAILY 90 tablet 3     Multiple Vitamin (MULTIVITAMIN OR) Take 1 tablet by mouth daily        nitroglycerin (NITROSTAT) 0.4 MG SL tablet Place 1 tablet (0.4 mg) under the tongue every 5 minutes as needed for chest pain if you  are still having symptoms after 3 doses (15 minutes) call 911. 25 tablet 1     ondansetron (ZOFRAN-ODT) 8 MG ODT tab Take 1 tablet (8 mg) by mouth every 8 hours as needed 30 tablet 5     order for DME Equipment being ordered: Power Wheel Chair 1 Device 0     order for DME Equipment being ordered: bandage tape, prefer paper 1 Package 0     order for DME Full electric hospital bed with half rails    Dx: W73886, I110, J449  Length of need: lifetime 1 Device 0     order for DME Wheel Chair Cushion: 18 x 18 inch Roho cushion 1 Device 0     order for DME Hospital bed with side rails 1 Device 0     order for DME Equipment being ordered: CPAP supplies mask, hose, filters, cushion    fax to [facility] at [phone] 10 Device prn     order for DME Equipment being ordered: CPAP supplies mask, hose, filters, cushion fax to [facility] at [phone]  Disp: 10 Device Refills: prn   Class: Local Print Start: 2/10/2017 1 Device 0     order for DME Equipment being ordered: Nebulizer and tubing supplies 1 Units 0     order for DME Equipment being ordered: Glucerna daily shakes with each meal 1 Box 11     ORDER FOR DME Equipment being ordered: Power Wheelchair 1 Device 0     ORDER FOR DME Equipment being ordered: Depends briefs 1 Month 11     pantoprazole (PROTONIX) 40 MG EC tablet Take 1 tablet (40 mg) by mouth daily 30 tablet 5     phenytoin 200 MG CAPS Take 200 mg by mouth 2 times daily 60 capsule 0     polyethylene glycol (MIRALAX/GLYCOLAX) Packet Take 17 g by mouth daily Dissolved in water or juice        pregabalin (LYRICA) 75 MG capsule Take 2 capsules (150 mg) by mouth 2 times daily 120 capsule 5     risperiDONE (RISPERDAL) 0.5 MG tablet Take 0.5 mg by mouth At Bedtime       sennosides (SENOKOT) 8.6 MG tablet Take 1 tablet by mouth 2 times daily       solifenacin (VESICARE) 10 MG tablet Take 1 tablet (10 mg) by mouth daily 90 tablet 3     sucralfate (CARAFATE) 1 GM tablet Take 1 tablet (1 g) by mouth 4 times daily  May dissolve in 10 mL water is necessary. (Start upon completion of carafate suspension) 120 tablet 11     traZODone (DESYREL) 50 MG tablet Take 150 mg by mouth At Bedtime        umeclidinium (INCRUSE ELLIPTA) 62.5 MCG/INH oral inhaler Inhale 1 puff into the lungs daily       VENTOLIN  (90 BASE) MCG/ACT Inhaler Inhale 2 puffs into the lungs every 6 hours as needed for shortness of breath / dyspnea or wheezing 3 Inhaler 5     zinc 50 MG TABS Take 1 tablet by mouth daily           ROS:  10 point ROS neg other than the symptoms noted above in the HPI.    PHYSICAL EXAM:  /69   Pulse 88   Temp 97.5  F (36.4  C)   Resp 18   Wt 62.4 kg (137 lb 8 oz)   SpO2 98%   BMI 52.28 kg/m      Gen: sitting in bed, alert, cooperative and in no acute distress  HEENT: normocephalic; oropharynx clear  Card: RRR, S1, S2, no murmurs  Resp: lungs clear to auscultation bilaterally  GI: abdomen soft, not-tender  MSK: normal muscle tone, s/p bilateral AKA  Neuro: CX II-XII grossly in tact; ROM in upper extremities grossly in tact  Psych: alert and oriented x3; normal affect    LABORATORY/IMAGING DATA:  Reviewed as per Epic    ASSESSMENT/PLAN:    Peripheral Vascular Disease s/p Bilateral AKA  -- continues on ASA 81 mg daily and atorvastatin 40 mg daily     CAD s/p COLLEEN x2 to RCA (Sept 2016) / Diastolic CHF / HTN / HLD  SBPs 110s-140s with limited data. No chest pain.   -- continues on ASA 81 mg daily, atorvastatin 40 mg daily, HCTZ 12.5 mg daily, lisinopril 20 mg daily and metoprolol XL 50 mg daily   -- follow BPs and adjust medications as needed    Fe Deficiency Anemia  -- continues on FeSO4 325 mg BID     Esophagitis / Gastritis  -- continues on pantoprazole 40 mg daily and sucralfate 1g QID     CVA  By history  -- continues on ASA 81 mg daily and atorvastatin 40 mg daily    Seizure Disorder  In setting of prior CVA.   -- continues on levetiracetam 500 mg BID and phenytoin 200 mg BID     DM, Type II  Sugars 90s-110s overall.    -- continues on metformin 500 mg daily     Esophageal Stricture s/p Dilatation (9/6/16)  Dysphagia  -- continues on NDD3 with thin liquids     Schizoaffective Disorder  -- continues on escitalopram 10 mg daily and risperidone 0.5 mg at bedtime   -- supportive cares    Chronic Back Pain  Chronic Pain Syndrome  Historically has followed with [facility]. Has an intrathecal fentanyl/bupivicaine pump. She is working to re-establish care with former pain clinic  -- continues on pregabalin 150 mg BID  as well as diclofenac gel and lidoderm patch     COPD  No signs of exacerbation.   -- continues on Advair 250-50 mg BID, umeclidinium 62.5 mcg daily and PRN albuterol nebs and MDI    Overactive Bladder  -- continues on solifenacin 10 mg daily    Tobacco Dependence  Continues to smoke cigarettes. Acknowledges this makes her breathing more difficult, but no plans to quit.   -- continue to encourage cessation      Insomnia  -- continues on trazodone 150 mg at bedtime    Osteoporosis   -- continues on alendronate 70 mg weekly     Slow Transit Constipation   -- continues on Miralax 17 g daily, lactulose 20 g daily PRN and senna 1 tab BID  -- adjust bowel regimen as needed       FGS TIME SPENT: Total time spent with patient visit at the skilled nursing facility was 35 min including patient visit, review of past records and discussion with NP. Greater than 50% of total time spent with counseling and coordinating care due to complex patient, multiple co morbids with multiple subspecalists involved, recently moved back to [place] and needs to restablish care    Electronically signed by:  Cary Harden MD                                  Sincerely,        Cary Harden MD

## 2019-05-01 NOTE — TELEPHONE ENCOUNTER
Reason for Call: Request for an order or referral:    Order or referral being requested: to ck bp twice a day at facility pt hung up    Date needed: as soon as possible    Has the patient been seen by the PCP for this problem?     Additional comments:     Phone number Patient can be reached at:  Other phone number:  ?    Best Time:      Can we leave a detailed message on this number?      Call taken on 5/1/2019 at 9:38 AM by Nickie Whatley

## 2019-05-02 ENCOUNTER — PATIENT OUTREACH (OUTPATIENT)
Dept: CARE COORDINATION | Facility: CLINIC | Age: 69
End: 2019-05-02

## 2019-05-02 ENCOUNTER — MEDICAL CORRESPONDENCE (OUTPATIENT)
Dept: HEALTH INFORMATION MANAGEMENT | Facility: CLINIC | Age: 69
End: 2019-05-02

## 2019-05-02 ASSESSMENT — ACTIVITIES OF DAILY LIVING (ADL): DEPENDENT_IADLS:: CLEANING;COOKING;LAUNDRY;SHOPPING;MEAL PREPARATION;TRANSPORTATION

## 2019-05-02 NOTE — PROGRESS NOTES
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    Pt is doing well at her TCU but would like to settle back in at [facility]. Next week someone will be coming to see for her Waiver assessment. She will also be going to [facility] to complete needed paperwork and look at the room that they are holding for her. Pt stated that before she moves she will need to get a referral from a doctor for a hospital bed. She will outreach to nursing staff when she is getting closer to discharge from TCU.    Resources and Interventions:  Community Resources: [facility]  Supplies used at home:: Wound Care Supplies  Equipment Currently Used at Home: wheelchair, manual    Advance Care Plan/Directive  Advanced Care Plans/Directives on file:: Yes  Type Advanced Care Plans/Directives: DNR/DNI    [facility]     Goals:   Goals        General    Psychosocial (pt-stated)     Notes - Note edited  5/2/2019  1:21 PM by Grazyna Padgett LGSW    Goal Statement: Within the next 1-2 months, I want to move back into my previous home at [facility] in [place] for my overall wellbeing.     Measure of Success: Pt will contact [facility] and follow their instructions to apply for MA and Elderly waiver. Pt will verbally inform CC SW of success.    Supportive Steps to Achieve: CC SW provided [facility] Front Door number to start process. CC SW will provide ongoing assistance and follow up to determine next steps to succeed in goal    Barriers: Potential barriers to time in conversing with the [facility]    Strengths: Pt is very motivated to secure services to increase  independence, recognition of need for assistance, support system    Date to Achieve By: 6/18/2019    Patient expressed understanding of goal: Pt verbally stated understanding of goal    As of today's date 5/2/2019 goal is met at 26 - 50%.   Goal Status:  Ongoing          Patient/Caregiver understanding: Pt verbally stated understanding    Outreach Frequency: monthly  Future Appointments              In 1 week Inge Kerns APRN [facility], [facility] PSA CLIN    In 2 weeks Alina Jennings MD [facility], [facility]    In 1 month Guanaco Kowalski MD [facility],  SLEEP    In 1 month Marely Robin MD [facility], [facility] MSA CLIN        Plan: CC SW will follow up in 2-3 weeks to discuss progress of moving back to her [facility]Z. Pt was reminded of CC SW contact information and encouraged to call with questions or concerns that arise before next outreach.    NAOMI Allan, [facility]  Clinic Care Coordinator  [facility] [place]  [phone]  [email or web address]

## 2019-05-03 ENCOUNTER — DOCUMENTATION ONLY (OUTPATIENT)
Dept: LAB | Facility: CLINIC | Age: 69
End: 2019-05-03

## 2019-05-03 ENCOUNTER — TELEPHONE (OUTPATIENT)
Dept: INTERNAL MEDICINE | Facility: CLINIC | Age: 69
End: 2019-05-03

## 2019-05-03 DIAGNOSIS — G40.909 SEIZURE DISORDER (H): Primary | ICD-10-CM

## 2019-05-03 DIAGNOSIS — I69.998 DISTURBANCES OF VISION, LATE EFFECT OF CEREBROVASCULAR DISEASE: ICD-10-CM

## 2019-05-03 DIAGNOSIS — I69.959 HEMIPLEGIA OF DOMINANT SIDE, LATE EFFECT OF CEREBROVASCULAR DISEASE (H): Primary | ICD-10-CM

## 2019-05-03 DIAGNOSIS — H53.9 DISTURBANCES OF VISION, LATE EFFECT OF CEREBROVASCULAR DISEASE: ICD-10-CM

## 2019-05-03 DIAGNOSIS — I69.30 LATE EFFECT OF STROKE: ICD-10-CM

## 2019-05-03 DIAGNOSIS — G40.804 INTRACTABLE EPILEPSY WITH BOTH GENERALIZED AND FOCAL FEATURES (H): ICD-10-CM

## 2019-05-03 DIAGNOSIS — I69.359 CVA, OLD, HEMIPARESIS (H): ICD-10-CM

## 2019-05-03 NOTE — TELEPHONE ENCOUNTER
Reason for Call:  Other DME for Equipment: hospital bed with rails; pt moving to [facility]      Detailed comments: pt wants it sent to :  [facility] FAX: [phone]    Phone Number Patient can be reached at: Other phone number:  tele # for [facility] [phone]    Best Time: very soon    Can we leave a detailed message on this number?     Call taken on 5/3/2019 at 3:31 PM by Nickie Whatley

## 2019-05-03 NOTE — TELEPHONE ENCOUNTER
PCP please advise of request.     Patient last seen by geriatric provider on 5/1/2019. Are we able to sign off on this DME order?    Order Td'up. I would think office notes would need to included if ok with signing.    Valeria ZAMBRANO RN, BSN, PHN

## 2019-05-07 ENCOUNTER — ANCILLARY PROCEDURE (OUTPATIENT)
Dept: ULTRASOUND IMAGING | Facility: CLINIC | Age: 69
End: 2019-05-07
Attending: PSYCHIATRY & NEUROLOGY

## 2019-05-07 DIAGNOSIS — I65.23 BILATERAL CAROTID ARTERY STENOSIS: ICD-10-CM

## 2019-05-14 ENCOUNTER — OFFICE VISIT (OUTPATIENT)
Dept: CARDIOLOGY | Facility: CLINIC | Age: 69
End: 2019-05-14
Payer: COMMERCIAL

## 2019-05-14 VITALS
WEIGHT: 137 LBS | SYSTOLIC BLOOD PRESSURE: 91 MMHG | DIASTOLIC BLOOD PRESSURE: 58 MMHG | BODY MASS INDEX: 52.09 KG/M2 | HEART RATE: 82 BPM

## 2019-05-14 DIAGNOSIS — I10 BENIGN ESSENTIAL HYPERTENSION: ICD-10-CM

## 2019-05-14 DIAGNOSIS — G47.33 OSA (OBSTRUCTIVE SLEEP APNEA): ICD-10-CM

## 2019-05-14 DIAGNOSIS — Z72.0 TOBACCO ABUSE: ICD-10-CM

## 2019-05-14 DIAGNOSIS — I25.10 CORONARY ARTERY DISEASE INVOLVING NATIVE CORONARY ARTERY OF NATIVE HEART WITHOUT ANGINA PECTORIS: Primary | ICD-10-CM

## 2019-05-14 DIAGNOSIS — E78.5 HYPERLIPIDEMIA LDL GOAL <70: ICD-10-CM

## 2019-05-14 PROCEDURE — 99214 OFFICE O/P EST MOD 30 MIN: CPT | Performed by: NURSE PRACTITIONER

## 2019-05-14 NOTE — PATIENT INSTRUCTIONS
Thanks for coming into [facility] today.    Doing well on a cardiac standpoint   Reviewed last cardiac workup results.   Blood pressures low, will stop hydrochlorothiazide.  Continue on current medical therapy.   Encourage smoking cessation   Follow up in 1 year with Dr. Anderson       Please call my nurse at [phone] with any questions or concerns.    Scheduling phone number: [phone]  Reminder: Please bring in all current medications, over the counter supplements and vitamin bottles to your next appointment.

## 2019-05-14 NOTE — PROGRESS NOTES
Cardiology Clinic Progress Note  Sonya Foote MRN# [id number]   YOB: 1950 Age: 68 year old     Reason For Visit:  Annual follow up   Primary Cardiologist:   Dr. Anderson          History of Presenting Illness:    Sonya Foote is a pleasant 69 year old patient with a history significant for CAD s/p stent RCA, MI, CVA chronic dysphasia, esophageal strictures and previous dilatations, diabetes, bilateral above the knee amputations (2016), tobacco abuse, COPD, hypertension, and carotid disease.     She was last seen on 6/26/18 for routine follow up. She was noted to be borderline hypotensive and hydrochlorothiazide was discontinued. Her last cardiac workup consisted of an angiogram that confirmed moderate in-stent re-narrowing and additional disease in the right coronary artery which appeared to be non-flow-limiting.  Her left coronary anatomy confirmed just mild disease. Last echocardiogram confirmed hyperdynamic left ventricular function, visual ejection fraction estimated at greater than 70%, no regional wall motion abnormalities,and no significant valvular disease. In July of 2018 she relocated to [place] to live with her sister. Unfortunately, her sister was unable to care for her and she has now returned to [place]. She comes to the office today for an annual follow up.     She is feeling well on a cardiac standpoint with the exception of occasional shortness of breath. She denies chest pain, PND, orthopnea, presyncope, syncope, heart racing, or palpitations. Her primary complaint is neck and arm pain believed to be related to her manual wheelchair.     Recent carotid US demonstrated < 50% stenosis to ASTRID and patent left LIC artery stent.     Blood pressure today is borderline low at 91/58, asymptomatic. In review of her medications, she continues to take hydrochlorothiazide which was stopped last year.  Last BMP showed a sodium of 138, potassium 4.1, BUN 12, Creatinine 0.7, and GFR 88    Lipids  (8/2018) showed a total cholesterol of 139, HDL 49, LDL 74, and TG 82, managed on Lipitor.     She continues to smoke 5-6 cigarettes/day.   She remains compliant with all medications and CPAP.                   Assessment and Plan:     1. Coronary artery disease of native artery of native heart with stable angina pectoris (H)  (primary encounter diagnosis)  - No symptoms of ischemia. Last angiogram confirms moderate in-stent re-narrowing and additional disease in the right coronary artery which appeared to be non-flow-limiting.  Her left coronary anatomy confirmed just mild disease. Echo demonstrated hyperdynamic left ventricular function, visual ejection fraction estimated at greater than 70%, no regional wall motion abnormalities,and no significant valvular disease.  - Continue on aspirin, protonix, Metoprolol, lisinopril and statin.     2. Essential hypertension  Borderline low today 91/58.  Stop hydrochlorothiazide. Continue on metoprolol and lisinopril.     3.  Hyperlipidemia LDL goal <70  - LDL stable at 74, Continue on Lipitor. Due for follow up FLP in August.     4. Tobacco abuse  -  Encourage smoking cessation    5.  JOSE  - CPAP                Thank you for allowing me to participate in this delightful patient's care. She is recommended to follow up in 1 year with Dr. Anderson.   Inge Kerns, APRN, CNP          Review of Systems:   Review of Systems:  Skin:  not assessed     Eyes:  Positive for glasses;glaucoma  ENT:  Negative    Respiratory:  Positive for dyspnea on exertion;sleep apnea;CPAP;cough  Cardiovascular:    Positive for;palpitations;lightheadedness;fatigue  Gastroenterology: Positive for heartburn  Genitourinary:  not assessed    Musculoskeletal:  Positive for back pain;joint pain;arthritis  Neurologic:  Positive for headaches;numbness or tingling of hands  Psychiatric:  Negative    Heme/Lymph/Imm:  Positive for allergies  Endocrine:  Positive for diabetes              Physical Exam:      Vitals: BP 91/58   Pulse 82   Wt 62.1 kg (137 lb)   BMI 52.09 kg/m    Constitutional:  cooperative;well nourished        Skin:  warm and dry to the touch, no apparent skin lesions or masses noted        Head:  normocephalic, no masses or lesions        Eyes:  not assessed this visit        ENT:    poor dentition      Neck:    right carotid bruit      Chest:  normal breath sounds, clear to auscultation, normal A-P diameter, normal symmetry, normal respiratory excursion, no use of accessory muscles        Cardiac: regular rhythm, normal S1/S2, no S3 or S4, apical impulse not displaced, no murmurs, gallops or rubs                  Abdomen:  abdomen soft        Vascular:                                        Extremities and Back:    s/p amputation Bilateral AKA    Neurological:  affect appropriate                 Medications:     Current Outpatient Medications   Medication Sig Dispense Refill     ACETAMINOPHEN PO Take 1,000 mg by mouth every 6 hours as needed for pain Not to exceed 4000 mg/day       ADVAIR DISKUS 250-50 MCG/DOSE diskus inhaler Inhale 1 puff into the lungs 2 times daily 60 Inhaler 11     albuterol (2.5 MG/3ML) 0.083% neb solution INHALE 1 VIAL VIA NEBULIZER EVERY 6 HOURS AS NEEDED 360 mL 11     alendronate (FOSAMAX) 70 MG tablet Take 1 tablet (70 mg) by mouth with 8oz water every 7 days 30 minutes before breakfast and remain upright during this time. 12 tablet 3     aspirin EC 81 MG EC tablet Take 1 tablet (81 mg) by mouth daily 90 tablet 3     atorvastatin (LIPITOR) 40 MG tablet Take 1 tablet (40 mg) by mouth daily 90 tablet 2     blood glucose monitoring (ONE TOUCH ULTRA 2) meter device kit Use to test blood sugars 3 times daily or as directed. 1 kit 0     blood glucose monitoring (ONE TOUCH ULTRA) test strip Use to test blood sugars 3 times daily or as directed. 3 Box 3     blood glucose monitoring (ONE TOUCH ULTRASOFT) lancets Use to test blood sugar 3 times daily or as directed. 100 each PRN      brimonidine (ALPHAGAN) 0.2 % ophthalmic solution Place 1 drop into the right eye 2 times daily 1 Bottle 11     calcium citrate-vitamin D (CITRACAL) 315-250 MG-UNIT TABS Take 2 tablets by mouth daily 120 tablet 5     cetirizine (ZYRTEC) 10 MG tablet Take 1 tablet (10 mg) by mouth every evening 30 tablet 3     Cholecalciferol (VITAMIN D) 2000 UNITS tablet Take 2,000 Units by mouth daily. 100 tablet 3     clotrimazole (LOTRIMIN) 1 % cream INSERT 1 APPLICATORFUL VAGINALLY TWICE A DAY FOR VAGINAL PAIN 12 g 0     CYANOCOBALAMIN PO Take 2,000 mcg by mouth daily       diclofenac (VOLTAREN) 1 % GEL topical gel Apply 2 g topically 4 times daily to hands 100 g 11     dorzolamide (TRUSOPT) 2 % ophthalmic solution Place 1 drop into the right eye 2 times daily 1 Bottle 11     EPINEPHrine (EPIPEN/ADRENACLICK/OR ANY BX GENERIC EQUIV) 0.3 MG/0.3ML injection 2-pack Inject 0.3 mLs (0.3 mg) into the muscle as needed for anaphylaxis 0.6 mL 1     escitalopram (LEXAPRO) 10 MG tablet Take 10 mg by mouth daily        ferrous sulfate (IRON) 325 (65 FE) MG tablet Take 1 tablet (325 mg) by mouth 2 times daily With meals 60 tablet 2     fluticasone (FLONASE) 50 MCG/ACT nasal spray Spray 1 spray into both nostrils daily       lactulose (CHRONULAC) 10 GM/15ML solution Take 20 g by mouth as needed for constipation       latanoprost (XALATAN) 0.005 % ophthalmic solution Place 1 drop into both eyes At Bedtime 2.5 mL 11     levETIRAcetam (KEPPRA) 500 MG tablet Take 1 tablet (500 mg) by mouth 2 times daily 180 tablet 1     lidocaine (LIDODERM) 5 % Patch APPLY 1 TO 3 PATCHES TO PAINFUL AREA AT ONCE FOR UP TO 12 HOURS WITHIN A 24 HOUR PERIOD REMOVE AFTER 12 HOURS 30 patch 5     lisinopril (PRINIVIL/ZESTRIL) 20 MG tablet TAKE 1 TABLET BY MOUTH DAILY DX:HYPERTENSION 90 tablet 3     metFORMIN (GLUCOPHAGE-XR) 500 MG 24 hr tablet Take 1 tablet (500 mg) by mouth daily (with dinner) 90 tablet 3     metoprolol succinate (TOPROL-XL) 50 MG 24 hr tablet TAKE 1  TABLET BY MOUTH DAILY 90 tablet 3     Multiple Vitamin (MULTIVITAMIN OR) Take 1 tablet by mouth daily        nitroglycerin (NITROSTAT) 0.4 MG SL tablet Place 1 tablet (0.4 mg) under the tongue every 5 minutes as needed for chest pain if you are still having symptoms after 3 doses (15 minutes) call 911. 25 tablet 1     ondansetron (ZOFRAN-ODT) 8 MG ODT tab Take 1 tablet (8 mg) by mouth every 8 hours as needed 30 tablet 5     order for DME Equipment being ordered: Hospital Bed with side rails 1 each 0     order for DME Equipment being ordered: Power Wheel Chair 1 Device 0     order for DME Equipment being ordered: bandage tape, prefer paper 1 Package 0     order for DME Full electric hospital bed with half rails    Dx: F26476, I110, J449  Length of need: lifetime 1 Device 0     order for DME Wheel Chair Cushion: 18 x 18 inch Roho cushion 1 Device 0     order for DME Hospital bed with side rails 1 Device 0     order for DME Equipment being ordered: CPAP supplies mask, hose, filters, cushion    fax to [facility] at [phone] 10 Device prn     order for DME Equipment being ordered: CPAP supplies mask, hose, filters, cushion fax to [facility] at [phone]  Disp: 10 Device Refills: prn   Class: Local Print Start: 2/10/2017 1 Device 0     order for DME Equipment being ordered: Nebulizer and tubing supplies 1 Units 0     order for DME Equipment being ordered: Glucerna daily shakes with each meal 1 Box 11     ORDER FOR DME Equipment being ordered: Power Wheelchair 1 Device 0     ORDER FOR DME Equipment being ordered: Depends briefs 1 Month 11     pantoprazole (PROTONIX) 40 MG EC tablet Take 1 tablet (40 mg) by mouth daily 30 tablet 5     phenytoin 200 MG CAPS Take 200 mg by mouth 2 times daily 60 capsule 0     polyethylene glycol (MIRALAX/GLYCOLAX) Packet Take 17 g by mouth daily Dissolved in water or juice        pregabalin (LYRICA) 75 MG capsule Take 2 capsules (150 mg) by mouth 2 times daily (Patient taking  differently: Take 150 mg by mouth 3 times daily ) 120 capsule 5     risperiDONE (RISPERDAL) 0.5 MG tablet Take 0.5 mg by mouth At Bedtime       sennosides (SENOKOT) 8.6 MG tablet Take 1 tablet by mouth 2 times daily       solifenacin (VESICARE) 10 MG tablet Take 1 tablet (10 mg) by mouth daily 90 tablet 3     sucralfate (CARAFATE) 1 GM tablet Take 1 tablet (1 g) by mouth 4 times daily May dissolve in 10 mL water is necessary. (Start upon completion of carafate suspension) 120 tablet 11     traZODone (DESYREL) 50 MG tablet Take 150 mg by mouth At Bedtime        umeclidinium (INCRUSE ELLIPTA) 62.5 MCG/INH oral inhaler Inhale 1 puff into the lungs daily       VENTOLIN  (90 BASE) MCG/ACT Inhaler Inhale 2 puffs into the lungs every 6 hours as needed for shortness of breath / dyspnea or wheezing 3 Inhaler 5     zinc 50 MG TABS Take 1 tablet by mouth daily             Family History   Problem Relation Age of Onset     Dementia Mother      Glaucoma Mother      Diabetes Mother         may have been type 1, childhood     Coronary Artery Disease Mother         MI     Glaucoma Father      Diabetes Father         may hev been type 1 - ??     Heart Failure Father      Cancer Maternal Aunt         leukemia     Schizophrenia Brother      Depression Brother      Suicide Sister      Depression Sister      Diabetes Sister      Cancer Maternal Aunt         ovarian     Glaucoma Maternal Grandmother      Diabetes Maternal Grandmother      Glaucoma Maternal Grandfather      Diabetes Maternal Grandfather      Glaucoma Paternal Grandmother      Diabetes Paternal Grandmother      Glaucoma Paternal Grandfather      Diabetes Paternal Grandfather      Breast Cancer Sister      Cerebrovascular Disease Brother      Colon Cancer No family hx of      Macular Degeneration No family hx of        Social History     Socioeconomic History     Marital status:      Spouse name: Not on file     Number of children: 2     Years of education:  Not on file     Highest education level: Not on file   Occupational History     Occupation: retired     Comment: retired   Social Needs     Financial resource strain: Not on file     Food insecurity:     Worry: Not on file     Inability: Not on file     Transportation needs:     Medical: Not on file     Non-medical: Not on file   Tobacco Use     Smoking status: Current Every Day Smoker     Packs/day: 0.25     Years: 30.00     Pack years: 7.50     Types: Cigarettes, Cigars     Last attempt to quit: 2001     Years since quittin.5     Smokeless tobacco: Never Used   Substance and Sexual Activity     Alcohol use: No     Alcohol/week: 0.0 oz     Drug use: No     Sexual activity: Not Currently   Lifestyle     Physical activity:     Days per week: Not on file     Minutes per session: Not on file     Stress: Not on file   Relationships     Social connections:     Talks on phone: Not on file     Gets together: Not on file     Attends Hindu service: Not on file     Active member of club or organization: Not on file     Attends meetings of clubs or organizations: Not on file     Relationship status: Not on file     Intimate partner violence:     Fear of current or ex partner: Not on file     Emotionally abused: Not on file     Physically abused: Not on file     Forced sexual activity: Not on file   Other Topics Concern     Parent/sibling w/ CABG, MI or angioplasty before 65F 55M? Not Asked      Service Not Asked     Blood Transfusions Not Asked     Caffeine Concern No     Comment: 1 in the morning     Occupational Exposure Not Asked     Hobby Hazards Not Asked     Sleep Concern Not Asked     Stress Concern Not Asked     Weight Concern Not Asked     Special Diet Not Asked     Back Care Not Asked     Exercise Not Asked     Bike Helmet Not Asked     Seat Belt Not Asked     Self-Exams Not Asked   Social History Narrative      Her father was in the  and she moved around a lot when she was a kid, and  has lived abroad including in [place] and the [facility].  She was previously employed as a mental health worker. Moved from [place] to [place] in 2012. She is currently living in assisted living ([facility] in [facility]).  Wife passed away 6/2017.            She has 2 adopted children (Kam and Prashanth)            Past Medical History:     Past Medical History:   Diagnosis Date     Anemia      Antiplatelet or antithrombotic long-term use      Arthritis      AS (sickle cell trait) (H) 10/8/2013     Blind left eye      BPPV (benign paroxysmal positional vertigo)      Chronic back pain      COPD (chronic obstructive pulmonary disease) (H)      Coronary artery disease      CVA (cerebral infarction) 10/8/2012    x3, residual left sided weakness     Diastolic CHF (H) 9/4/2012     Dilantin toxicity 5/31/2013     Esophageal candidiasis (H)      GERD (gastroesophageal reflux disease)      Heart attack (H)      Hernia, abdominal      History of blood transfusion     x5     History of thrombophlebitis      HTN (hypertension) 9/4/2012     Hyperlipidemia LDL goal <100 9/4/2012     Legally blind in right eye, as defined in [place]     No periphal vision right eye     Osteoporosis 1/21/2013     Other chronic pain      PAD (peripheral artery disease) (H)      Palpitations      Person who has had sex change operation 1/20/2014     Pneumonia      Schizoaffective disorder (H)      Seizure disorder (H) 9/4/2012     Sleep apnea     CPAP     Thrombosis of leg      Type 2 diabetes, HbA1C goal < 8% (H) 9/4/2012     Uncomplicated asthma      Unspecified cerebral artery occlusion with cerebral infarction               Past Surgical History:     Past Surgical History:   Procedure Laterality Date     AMPUTATE LEG ABOVE KNEE Left 6/11/2016    Procedure: AMPUTATE LEG ABOVE KNEE;  Surgeon: Mello Rodriguez MD;  [facility]: [facility]     AMPUTATE LEG BELOW KNEE Right 11/7/2016    Procedure: AMPUTATE LEG BELOW KNEE;  Surgeon: Savannah Durant,  MD;  [facility]     AMPUTATE REVISION STUMP LOWER EXTREMITY Right 11/11/2016    Procedure: AMPUTATE REVISION STUMP LOWER EXTREMITY;  Surgeon: Savannah Durant MD;  [facility]     AMPUTATE REVISION STUMP LOWER EXTREMITY Right 11/16/2016    Procedure: AMPUTATE REVISION STUMP LOWER EXTREMITY;  Surgeon: Savannah Durant MD;  [facility]     AMPUTATE TOE(S) Right 1/5/2016    Procedure: AMPUTATE TOE(S);  Surgeon: Mello Gaines DPM;  Location: [facility]     ANGIOGRAM Bilateral 11/21/2014    Procedure: ANGIOGRAM;  Surgeon: Savannah Durant MD;  [facility]     ANGIOGRAM Left 1/16/2015    Procedure: ANGIOGRAM;  Surgeon: Savannah Durant MD;  [facility]     ANGIOGRAM Bilateral 9/14/2015    Procedure: ANGIOGRAM;  Surgeon: Savannah Durant MD;  [facility]     ANGIOGRAM Left 10/12/2015    Procedure: ANGIOGRAM;  Surgeon: Savannah Durant MD;  [facility]     ANGIOGRAM Right 6/6/2016    Procedure: ANGIOGRAM;  Surgeon: Savannah Durant MD;  [facility]     ANGIOPLASTY Right 6/6/2016    Procedure: ANGIOPLASTY;  Surgeon: Savannah Durant MD;  [facility]     APPENDECTOMY       BREAST SURGERY      right breast bx (benign)     CATARACT IOL, RT/LT Right      CHOLECYSTECTOMY       COLONOSCOPY N/A 8/25/2014    Procedure: COLONOSCOPY;  Surgeon: Mello Ferrer MD;  [facility]     COLONOSCOPY WITH CO2 INSUFFLATION N/A 8/20/2014    Procedure: COLONOSCOPY WITH CO2 INSUFFLATION;  Surgeon: Duane, William Charles, MD;  Location: [facility]     CYSTOSTOMY, INSERT TUBE SUPRAPUBIC, COMBINED N/A 1/16/2018    Procedure: COMBINED CYSTOSTOMY, INSERT TUBE SUPRAPUBIC;  Cystoscopy, Intraoperative Ultrasound, Suprapubic Tube Placement;  Surgeon: Keanu Dawson MD;  [facility]     ENDARTERECTOMY FEMORAL  5/23/2014    Procedure: ENDARTERECTOMY FEMORAL;  Surgeon: Jason Joshi MD;  [facility]     ESOPHAGOSCOPY, GASTROSCOPY, DUODENOSCOPY (EGD), COMBINED  12/14/2012    Procedure: COMBINED ESOPHAGOSCOPY,  GASTROSCOPY, DUODENOSCOPY (EGD), BIOPSY SINGLE OR MULTIPLE;  ESOPHAGOSCOPY, GASTROSCOPY, DUODENOSCOPY (EGD), DILATATION ;  Surgeon: Elizabeth Stevenson MD;  Location:  GI     ESOPHAGOSCOPY, GASTROSCOPY, DUODENOSCOPY (EGD), COMBINED  12/31/2013    Procedure: COMBINED ESOPHAGOSCOPY, GASTROSCOPY, DUODENOSCOPY (EGD), BIOPSY SINGLE OR MULTIPLE;;  Surgeon: Clemente Lopez MD;  Location:  GI     ESOPHAGOSCOPY, GASTROSCOPY, DUODENOSCOPY (EGD), COMBINED  4/1/2014    Procedure: COMBINED ESOPHAGOSCOPY, GASTROSCOPY, DUODENOSCOPY (EGD);;  Surgeon: Clemente Lopez MD;  Location:  GI     ESOPHAGOSCOPY, GASTROSCOPY, DUODENOSCOPY (EGD), COMBINED  6/28/2014    Procedure: COMBINED ESOPHAGOSCOPY, GASTROSCOPY, DUODENOSCOPY (EGD);  Surgeon: Clemente Lopez MD;  Location:  GI     ESOPHAGOSCOPY, GASTROSCOPY, DUODENOSCOPY (EGD), COMBINED N/A 8/20/2014    Procedure: COMBINED ESOPHAGOSCOPY, GASTROSCOPY, DUODENOSCOPY (EGD), BIOPSY SINGLE OR MULTIPLE;  Surgeon: Duane, William Charles, MD;  Location: [facility]     ESOPHAGOSCOPY, GASTROSCOPY, DUODENOSCOPY (EGD), COMBINED N/A 8/22/2014    Procedure: COMBINED ESOPHAGOSCOPY, GASTROSCOPY, DUODENOSCOPY (EGD), BIOPSY SINGLE OR MULTIPLE;  Surgeon: Mello Ferrer MD;  Location:  GI     ESOPHAGOSCOPY, GASTROSCOPY, DUODENOSCOPY (EGD), COMBINED N/A 10/2/2014    Procedure: COMBINED ESOPHAGOSCOPY, GASTROSCOPY, DUODENOSCOPY (EGD), BIOPSY SINGLE OR MULTIPLE;  Surgeon: Remy Haskins MD;  Location:  GI     ESOPHAGOSCOPY, GASTROSCOPY, DUODENOSCOPY (EGD), COMBINED Left 12/15/2014    Procedure: COMBINED ESOPHAGOSCOPY, GASTROSCOPY, DUODENOSCOPY (EGD), BIOPSY SINGLE OR MULTIPLE;  Surgeon: Remy Haskins MD;  Location:  GI     ESOPHAGOSCOPY, GASTROSCOPY, DUODENOSCOPY (EGD), COMBINED N/A 2/25/2015    Procedure: COMBINED ENDOSCOPIC ULTRASOUND, ESOPHAGOSCOPY, GASTROSCOPY, DUODENOSCOPY (EGD), FINE NEEDLE ASPIRATE/BIOPSY;  Surgeon: Clemente Lugo MD;  Location:  GI     ESOPHAGOSCOPY,  GASTROSCOPY, DUODENOSCOPY (EGD), COMBINED Left 2/25/2015    Procedure: COMBINED ESOPHAGOSCOPY, GASTROSCOPY, DUODENOSCOPY (EGD), BIOPSY SINGLE OR MULTIPLE;  Surgeon: Clemente Lugo MD;  [facility]     ESOPHAGOSCOPY, GASTROSCOPY, DUODENOSCOPY (EGD), COMBINED N/A 9/25/2016    Procedure: COMBINED ESOPHAGOSCOPY, GASTROSCOPY, DUODENOSCOPY (EGD);  Surgeon: Aziza Patiño MD;  [facility]     ESOPHAGOSCOPY, GASTROSCOPY, DUODENOSCOPY (EGD), COMBINED N/A 1/18/2017    Procedure: COMBINED ESOPHAGOSCOPY, GASTROSCOPY, DUODENOSCOPY (EGD), BIOPSY SINGLE OR MULTIPLE;  Surgeon: Clemente Lopez MD;  [facility]     ESOPHAGOSCOPY, GASTROSCOPY, DUODENOSCOPY (EGD), COMBINED N/A 11/26/2017    Procedure: COMBINED ESOPHAGOSCOPY, GASTROSCOPY, DUODENOSCOPY (EGD), REMOVE FOREIGN BODY;  Esophagogastroduodenoscopy with foreign body extraction  ;  Surgeon: Herberth Castrejon MD;  [facility]     ESOPHAGOSCOPY, GASTROSCOPY, DUODENOSCOPY (EGD), COMBINED N/A 11/26/2017    Procedure: COMBINED ESOPHAGOSCOPY, GASTROSCOPY, DUODENOSCOPY (EGD), REMOVE FOREIGN BODY;;  Surgeon: Herberth Castrejon MD;  [facility]     FASCIOTOMY LOWER EXTREMITY Left 6/10/2016    Procedure: FASCIOTOMY LOWER EXTREMITY;  Surgeon: Mello Rodriguez MD;  [facility]     HC CAPSULE ENDOSCOPY N/A 8/25/2014    Procedure: CAPSULE/PILL CAM ENDOSCOPY;  Surgeon: Remy Haskins MD;  [facility]     HC CAPSULE ENDOSCOPY N/A 10/2/2014    Procedure: CAPSULE/PILL CAM ENDOSCOPY;  Surgeon: Remy Haskins MD;  [facility]     ORTHOPEDIC SURGERY      broken wrist repair     SEX TRANSFORMATION SURGERY, MALE TO FEMALE      1974     SINUS SURGERY      cyst removed     TONSILLECTOMY       VASCULAR SURGERY      Left carotid stent              Allergies:   Bee venom; Penicillins; Dilantin [phenytoin]; Iodide; Iodine-131; Novocaine [procaine]; and Tositumomab       Data:   All laboratory data reviewed:    LAST CHOLESTEROL:  Lab Results    Component Value Date    CHOL 155 06/06/2016     Lab Results   Component Value Date    HDL 65 06/06/2016     Lab Results   Component Value Date    LDL 77 06/06/2016     Lab Results   Component Value Date    TRIG 62 06/06/2016     Lab Results   Component Value Date    CHOLHDLRATIO 2.7 09/15/2015       LAST BMP:  Lab Results   Component Value Date     05/03/2018      Lab Results   Component Value Date    POTASSIUM 3.8 05/03/2018     Lab Results   Component Value Date    CHLORIDE 110 05/03/2018     Lab Results   Component Value Date    CAROL 8.7 05/03/2018     Lab Results   Component Value Date    CO2 28 05/03/2018     Lab Results   Component Value Date    BUN 6 05/03/2018     Lab Results   Component Value Date    CR 0.48 05/03/2018     Lab Results   Component Value Date    GLC 84 05/03/2018       LAST CBC:  Lab Results   Component Value Date    WBC 9.0 05/02/2018     Lab Results   Component Value Date    RBC 3.83 05/02/2018     Lab Results   Component Value Date    HGB 11.4 05/02/2018     Lab Results   Component Value Date    HCT 33.5 05/02/2018     Lab Results   Component Value Date    MCV 88 05/02/2018     Lab Results   Component Value Date    MCH 29.8 05/02/2018     Lab Results   Component Value Date    MCHC 34.0 05/02/2018     Lab Results   Component Value Date    RDW 14.5 05/02/2018     Lab Results   Component Value Date     05/02/2018

## 2019-05-14 NOTE — LETTER
5/14/2019    Allan Casey MD  [address] [facility] [address]    RE: Sonya Foote       Dear Colleague,    I had the pleasure of seeing Sonya Foote in the [facility].    Cardiology Clinic Progress Note  Sonya Foote MRN# [id number]   YOB: 1950 Age: 68 year old     Reason For Visit:  Annual follow up   Primary Cardiologist:   Dr. Anderson          History of Presenting Illness:    Sonya Foote is a pleasant 69 year old patient with a history significant for CAD s/p stent RCA, MI, CVA chronic dysphasia, esophageal strictures and previous dilatations, diabetes, bilateral above the knee amputations (2016), tobacco abuse, COPD, hypertension, and carotid disease.     She was last seen on 6/26/18 for routine follow up. She was noted to be borderline hypotensive and hydrochlorothiazide was discontinued. Her last cardiac workup consisted of an angiogram that confirmed moderate in-stent re-narrowing and additional disease in the right coronary artery which appeared to be non-flow-limiting.  Her left coronary anatomy confirmed just mild disease. Last echocardiogram confirmed hyperdynamic left ventricular function, visual ejection fraction estimated at greater than 70%, no regional wall motion abnormalities,and no significant valvular disease. In July of 2018 she relocated to [place] to live with her sister. Unfortunately, her sister was unable to care for her and she has now returned to [place]. She comes to the office today for an annual follow up.     She is feeling well on a cardiac standpoint with the exception of occasional shortness of breath. She denies chest pain, PND, orthopnea, presyncope, syncope, heart racing, or palpitations. Her primary complaint is neck and arm pain believed to be related to her manual wheelchair.     Recent carotid US demonstrated < 50% stenosis to ASTRID and patent left LIC artery stent.     Blood pressure today is borderline low at 91/58,  asymptomatic. In review of her medications, she continues to take hydrochlorothiazide which was stopped last year.  Last BMP showed a sodium of 138, potassium 4.1, BUN 12, Creatinine 0.7, and GFR 88    Lipids (8/2018) showed a total cholesterol of 139, HDL 49, LDL 74, and TG 82, managed on Lipitor.     She continues to smoke 5-6 cigarettes/day.   She remains compliant with all medications and CPAP.                   Assessment and Plan:     1. Coronary artery disease of native artery of native heart with stable angina pectoris (H)  (primary encounter diagnosis)  - No symptoms of ischemia. Last angiogram confirms moderate in-stent re-narrowing and additional disease in the right coronary artery which appeared to be non-flow-limiting.  Her left coronary anatomy confirmed just mild disease. Echo demonstrated hyperdynamic left ventricular function, visual ejection fraction estimated at greater than 70%, no regional wall motion abnormalities,and no significant valvular disease.  - Continue on aspirin, protonix, Metoprolol, lisinopril and statin.     2. Essential hypertension  Borderline low today 91/58.  Stop hydrochlorothiazide. Continue on metoprolol and lisinopril.     3.  Hyperlipidemia LDL goal <70  - LDL stable at 74, Continue on Lipitor. Due for follow up FLP in August.     4. Tobacco abuse  -  Encourage smoking cessation    5.  JOSE  - CPAP                Thank you for allowing me to participate in this delightful patient's care. She is recommended to follow up in 1 year with Dr. Anderson.   Inge Kerns, VICTOR M, CNP          Review of Systems:   Review of Systems:  Skin:  not assessed     Eyes:  Positive for glasses;glaucoma  ENT:  Negative    Respiratory:  Positive for dyspnea on exertion;sleep apnea;CPAP;cough  Cardiovascular:    Positive for;palpitations;lightheadedness;fatigue  Gastroenterology: Positive for heartburn  Genitourinary:  not assessed    Musculoskeletal:  Positive for back pain;joint  pain;arthritis  Neurologic:  Positive for headaches;numbness or tingling of hands  Psychiatric:  Negative    Heme/Lymph/Imm:  Positive for allergies  Endocrine:  Positive for diabetes              Physical Exam:     Vitals: BP 91/58   Pulse 82   Wt 62.1 kg (137 lb)   BMI 52.09 kg/m     Constitutional:  cooperative;well nourished        Skin:  warm and dry to the touch, no apparent skin lesions or masses noted        Head:  normocephalic, no masses or lesions        Eyes:  not assessed this visit        ENT:    poor dentition      Neck:    right carotid bruit      Chest:  normal breath sounds, clear to auscultation, normal A-P diameter, normal symmetry, normal respiratory excursion, no use of accessory muscles        Cardiac: regular rhythm, normal S1/S2, no S3 or S4, apical impulse not displaced, no murmurs, gallops or rubs                  Abdomen:  abdomen soft        Vascular:                                        Extremities and Back:    s/p amputation Bilateral AKA    Neurological:  affect appropriate                 Medications:     Current Outpatient Medications   Medication Sig Dispense Refill     ACETAMINOPHEN PO Take 1,000 mg by mouth every 6 hours as needed for pain Not to exceed 4000 mg/day       ADVAIR DISKUS 250-50 MCG/DOSE diskus inhaler Inhale 1 puff into the lungs 2 times daily 60 Inhaler 11     albuterol (2.5 MG/3ML) 0.083% neb solution INHALE 1 VIAL VIA NEBULIZER EVERY 6 HOURS AS NEEDED 360 mL 11     alendronate (FOSAMAX) 70 MG tablet Take 1 tablet (70 mg) by mouth with 8oz water every 7 days 30 minutes before breakfast and remain upright during this time. 12 tablet 3     aspirin EC 81 MG EC tablet Take 1 tablet (81 mg) by mouth daily 90 tablet 3     atorvastatin (LIPITOR) 40 MG tablet Take 1 tablet (40 mg) by mouth daily 90 tablet 2     blood glucose monitoring (ONE TOUCH ULTRA 2) meter device kit Use to test blood sugars 3 times daily or as directed. 1 kit 0     blood glucose monitoring (ONE  TOUCH ULTRA) test strip Use to test blood sugars 3 times daily or as directed. 3 Box 3     blood glucose monitoring (ONE TOUCH ULTRASOFT) lancets Use to test blood sugar 3 times daily or as directed. 100 each PRN     brimonidine (ALPHAGAN) 0.2 % ophthalmic solution Place 1 drop into the right eye 2 times daily 1 Bottle 11     calcium citrate-vitamin D (CITRACAL) 315-250 MG-UNIT TABS Take 2 tablets by mouth daily 120 tablet 5     cetirizine (ZYRTEC) 10 MG tablet Take 1 tablet (10 mg) by mouth every evening 30 tablet 3     Cholecalciferol (VITAMIN D) 2000 UNITS tablet Take 2,000 Units by mouth daily. 100 tablet 3     clotrimazole (LOTRIMIN) 1 % cream INSERT 1 APPLICATORFUL VAGINALLY TWICE A DAY FOR VAGINAL PAIN 12 g 0     CYANOCOBALAMIN PO Take 2,000 mcg by mouth daily       diclofenac (VOLTAREN) 1 % GEL topical gel Apply 2 g topically 4 times daily to hands 100 g 11     dorzolamide (TRUSOPT) 2 % ophthalmic solution Place 1 drop into the right eye 2 times daily 1 Bottle 11     EPINEPHrine (EPIPEN/ADRENACLICK/OR ANY BX GENERIC EQUIV) 0.3 MG/0.3ML injection 2-pack Inject 0.3 mLs (0.3 mg) into the muscle as needed for anaphylaxis 0.6 mL 1     escitalopram (LEXAPRO) 10 MG tablet Take 10 mg by mouth daily        ferrous sulfate (IRON) 325 (65 FE) MG tablet Take 1 tablet (325 mg) by mouth 2 times daily With meals 60 tablet 2     fluticasone (FLONASE) 50 MCG/ACT nasal spray Spray 1 spray into both nostrils daily       lactulose (CHRONULAC) 10 GM/15ML solution Take 20 g by mouth as needed for constipation       latanoprost (XALATAN) 0.005 % ophthalmic solution Place 1 drop into both eyes At Bedtime 2.5 mL 11     levETIRAcetam (KEPPRA) 500 MG tablet Take 1 tablet (500 mg) by mouth 2 times daily 180 tablet 1     lidocaine (LIDODERM) 5 % Patch APPLY 1 TO 3 PATCHES TO PAINFUL AREA AT ONCE FOR UP TO 12 HOURS WITHIN A 24 HOUR PERIOD REMOVE AFTER 12 HOURS 30 patch 5     lisinopril (PRINIVIL/ZESTRIL) 20 MG tablet TAKE 1 TABLET BY  MOUTH DAILY DX:HYPERTENSION 90 tablet 3     metFORMIN (GLUCOPHAGE-XR) 500 MG 24 hr tablet Take 1 tablet (500 mg) by mouth daily (with dinner) 90 tablet 3     metoprolol succinate (TOPROL-XL) 50 MG 24 hr tablet TAKE 1 TABLET BY MOUTH DAILY 90 tablet 3     Multiple Vitamin (MULTIVITAMIN OR) Take 1 tablet by mouth daily        nitroglycerin (NITROSTAT) 0.4 MG SL tablet Place 1 tablet (0.4 mg) under the tongue every 5 minutes as needed for chest pain if you are still having symptoms after 3 doses (15 minutes) call 911. 25 tablet 1     ondansetron (ZOFRAN-ODT) 8 MG ODT tab Take 1 tablet (8 mg) by mouth every 8 hours as needed 30 tablet 5     order for DME Equipment being ordered: Hospital Bed with side rails 1 each 0     order for DME Equipment being ordered: Power Wheel Chair 1 Device 0     order for DME Equipment being ordered: bandage tape, prefer paper 1 Package 0     order for DME Full electric hospital bed with half rails    Dx: E48206, I110, J449  Length of need: lifetime 1 Device 0     order for DME Wheel Chair Cushion: 18 x 18 inch Roho cushion 1 Device 0     order for DME Hospital bed with side rails 1 Device 0     order for DME Equipment being ordered: CPAP supplies mask, hose, filters, cushion    fax to [facility] at [phone] 10 Device prn     order for DME Equipment being ordered: CPAP supplies mask, hose, filters, cushion fax to [facility] at [phone]  Disp: 10 Device Refills: prn   Class: Local Print Start: 2/10/2017 1 Device 0     order for DME Equipment being ordered: Nebulizer and tubing supplies 1 Units 0     order for DME Equipment being ordered: Glucerna daily shakes with each meal 1 Box 11     ORDER FOR DME Equipment being ordered: Power Wheelchair 1 Device 0     ORDER FOR DME Equipment being ordered: Depends briefs 1 Month 11     pantoprazole (PROTONIX) 40 MG EC tablet Take 1 tablet (40 mg) by mouth daily 30 tablet 5     phenytoin 200 MG CAPS Take 200 mg by mouth 2 times daily 60  capsule 0     polyethylene glycol (MIRALAX/GLYCOLAX) Packet Take 17 g by mouth daily Dissolved in water or juice        pregabalin (LYRICA) 75 MG capsule Take 2 capsules (150 mg) by mouth 2 times daily (Patient taking differently: Take 150 mg by mouth 3 times daily ) 120 capsule 5     risperiDONE (RISPERDAL) 0.5 MG tablet Take 0.5 mg by mouth At Bedtime       sennosides (SENOKOT) 8.6 MG tablet Take 1 tablet by mouth 2 times daily       solifenacin (VESICARE) 10 MG tablet Take 1 tablet (10 mg) by mouth daily 90 tablet 3     sucralfate (CARAFATE) 1 GM tablet Take 1 tablet (1 g) by mouth 4 times daily May dissolve in 10 mL water is necessary. (Start upon completion of carafate suspension) 120 tablet 11     traZODone (DESYREL) 50 MG tablet Take 150 mg by mouth At Bedtime        umeclidinium (INCRUSE ELLIPTA) 62.5 MCG/INH oral inhaler Inhale 1 puff into the lungs daily       VENTOLIN  (90 BASE) MCG/ACT Inhaler Inhale 2 puffs into the lungs every 6 hours as needed for shortness of breath / dyspnea or wheezing 3 Inhaler 5     zinc 50 MG TABS Take 1 tablet by mouth daily             Family History   Problem Relation Age of Onset     Dementia Mother      Glaucoma Mother      Diabetes Mother         may have been type 1, childhood     Coronary Artery Disease Mother         MI     Glaucoma Father      Diabetes Father         may hev been type 1 - ??     Heart Failure Father      Cancer Maternal Aunt         leukemia     Schizophrenia Brother      Depression Brother      Suicide Sister      Depression Sister      Diabetes Sister      Cancer Maternal Aunt         ovarian     Glaucoma Maternal Grandmother      Diabetes Maternal Grandmother      Glaucoma Maternal Grandfather      Diabetes Maternal Grandfather      Glaucoma Paternal Grandmother      Diabetes Paternal Grandmother      Glaucoma Paternal Grandfather      Diabetes Paternal Grandfather      Breast Cancer Sister      Cerebrovascular Disease Brother      Colon  Cancer No family hx of      Macular Degeneration No family hx of        Social History     Socioeconomic History     Marital status:      Spouse name: Not on file     Number of children: 2     Years of education: Not on file     Highest education level: Not on file   Occupational History     Occupation: retired     Comment: retired   Social Needs     Financial resource strain: Not on file     Food insecurity:     Worry: Not on file     Inability: Not on file     Transportation needs:     Medical: Not on file     Non-medical: Not on file   Tobacco Use     Smoking status: Current Every Day Smoker     Packs/day: 0.25     Years: 30.00     Pack years: 7.50     Types: Cigarettes, Cigars     Last attempt to quit: 2001     Years since quittin.5     Smokeless tobacco: Never Used   Substance and Sexual Activity     Alcohol use: No     Alcohol/week: 0.0 oz     Drug use: No     Sexual activity: Not Currently   Lifestyle     Physical activity:     Days per week: Not on file     Minutes per session: Not on file     Stress: Not on file   Relationships     Social connections:     Talks on phone: Not on file     Gets together: Not on file     Attends Church service: Not on file     Active member of club or organization: Not on file     Attends meetings of clubs or organizations: Not on file     Relationship status: Not on file     Intimate partner violence:     Fear of current or ex partner: Not on file     Emotionally abused: Not on file     Physically abused: Not on file     Forced sexual activity: Not on file   Other Topics Concern     Parent/sibling w/ CABG, MI or angioplasty before 65F 55M? Not Asked      Service Not Asked     Blood Transfusions Not Asked     Caffeine Concern No     Comment: 1 in the morning     Occupational Exposure Not Asked     Hobby Hazards Not Asked     Sleep Concern Not Asked     Stress Concern Not Asked     Weight Concern Not Asked     Special Diet Not Asked     Back Care Not  Asked     Exercise Not Asked     Bike Helmet Not Asked     Seat Belt Not Asked     Self-Exams Not Asked   Social History Narrative      Her father was in the  and she moved around a lot when she was a kid, and has lived abroad including in [place] and the [facility].  She was previously employed as a mental health worker. Moved from [place] to [place] in 2012. She is currently living in assisted living ([facility]).  Wife passed away 6/2017.            She has 2 adopted children (Kam and Prashanth)            Past Medical History:     Past Medical History:   Diagnosis Date     Anemia      Antiplatelet or antithrombotic long-term use      Arthritis      AS (sickle cell trait) (H) 10/8/2013     Blind left eye      BPPV (benign paroxysmal positional vertigo)      Chronic back pain      COPD (chronic obstructive pulmonary disease) (H)      Coronary artery disease      CVA (cerebral infarction) 10/8/2012    x3, residual left sided weakness     Diastolic CHF (H) 9/4/2012     Dilantin toxicity 5/31/2013     Esophageal candidiasis (H)      GERD (gastroesophageal reflux disease)      Heart attack (H)      Hernia, abdominal      History of blood transfusion     x5     History of thrombophlebitis      HTN (hypertension) 9/4/2012     Hyperlipidemia LDL goal <100 9/4/2012     Legally blind in right eye, as defined in [place]     No periphal vision right eye     Osteoporosis 1/21/2013     Other chronic pain      PAD (peripheral artery disease) (H)      Palpitations      Person who has had sex change operation 1/20/2014     Pneumonia      Schizoaffective disorder (H)      Seizure disorder (H) 9/4/2012     Sleep apnea     CPAP     Thrombosis of leg      Type 2 diabetes, HbA1C goal < 8% (H) 9/4/2012     Uncomplicated asthma      Unspecified cerebral artery occlusion with cerebral infarction               Past Surgical History:     Past Surgical History:   Procedure Laterality Date     AMPUTATE LEG ABOVE  KNEE Left 6/11/2016    Procedure: AMPUTATE LEG ABOVE KNEE;  Surgeon: Mello Rodriguez MD;  [facility]     AMPUTATE LEG BELOW KNEE Right 11/7/2016    Procedure: AMPUTATE LEG BELOW KNEE;  Surgeon: Savannah Durant MD;  [facility]     AMPUTATE REVISION STUMP LOWER EXTREMITY Right 11/11/2016    Procedure: AMPUTATE REVISION STUMP LOWER EXTREMITY;  Surgeon: Savannah Durant MD;  [facility]     AMPUTATE REVISION STUMP LOWER EXTREMITY Right 11/16/2016    Procedure: AMPUTATE REVISION STUMP LOWER EXTREMITY;  Surgeon: Savannah Durant MD;  [facility]     AMPUTATE TOE(S) Right 1/5/2016    Procedure: AMPUTATE TOE(S);  Surgeon: Mello Gaines DPM;  Location: [facility]     ANGIOGRAM Bilateral 11/21/2014    Procedure: ANGIOGRAM;  Surgeon: Savannah Durant MD;  [facility]     ANGIOGRAM Left 1/16/2015    Procedure: ANGIOGRAM;  Surgeon: Savannah Durant MD;  [facility]     ANGIOGRAM Bilateral 9/14/2015    Procedure: ANGIOGRAM;  Surgeon: Savannah Durant MD;  [facility]     ANGIOGRAM Left 10/12/2015    Procedure: ANGIOGRAM;  Surgeon: Savannah Durant MD;  [facility]     ANGIOGRAM Right 6/6/2016    Procedure: ANGIOGRAM;  Surgeon: Savannah Durant MD;  [facility]     ANGIOPLASTY Right 6/6/2016    Procedure: ANGIOPLASTY;  Surgeon: Savannah Durant MD;  [facility]     APPENDECTOMY       BREAST SURGERY      right breast bx (benign)     CATARACT IOL, RT/LT Right      CHOLECYSTECTOMY       COLONOSCOPY N/A 8/25/2014    Procedure: COLONOSCOPY;  Surgeon: Mello Ferrer MD;  [facility]     COLONOSCOPY WITH CO2 INSUFFLATION N/A 8/20/2014    Procedure: COLONOSCOPY WITH CO2 INSUFFLATION;  Surgeon: Duane, William Charles, MD;  Location: [facility]     CYSTOSTOMY, INSERT TUBE SUPRAPUBIC, COMBINED N/A 1/16/2018    Procedure: COMBINED CYSTOSTOMY, INSERT TUBE SUPRAPUBIC;  Cystoscopy, Intraoperative Ultrasound, Suprapubic Tube Placement;  Surgeon: Keanu Dawson MD;  [facility]     ENDARTERECTOMY FEMORAL   5/23/2014    Procedure: ENDARTERECTOMY FEMORAL;  Surgeon: Jason Joshi MD;  [facility]     ESOPHAGOSCOPY, GASTROSCOPY, DUODENOSCOPY (EGD), COMBINED  12/14/2012    Procedure: COMBINED ESOPHAGOSCOPY, GASTROSCOPY, DUODENOSCOPY (EGD), BIOPSY SINGLE OR MULTIPLE;  ESOPHAGOSCOPY, GASTROSCOPY, DUODENOSCOPY (EGD), DILATATION ;  Surgeon: Elizabeth Stevenson MD;  Location:  GI     ESOPHAGOSCOPY, GASTROSCOPY, DUODENOSCOPY (EGD), COMBINED  12/31/2013    Procedure: COMBINED ESOPHAGOSCOPY, GASTROSCOPY, DUODENOSCOPY (EGD), BIOPSY SINGLE OR MULTIPLE;;  Surgeon: Clemente Lopez MD;  Location:  GI     ESOPHAGOSCOPY, GASTROSCOPY, DUODENOSCOPY (EGD), COMBINED  4/1/2014    Procedure: COMBINED ESOPHAGOSCOPY, GASTROSCOPY, DUODENOSCOPY (EGD);;  Surgeon: Clemente Lopez MD;  Location:  GI     ESOPHAGOSCOPY, GASTROSCOPY, DUODENOSCOPY (EGD), COMBINED  6/28/2014    Procedure: COMBINED ESOPHAGOSCOPY, GASTROSCOPY, DUODENOSCOPY (EGD);  Surgeon: Clemente Lopez MD;  Location:  GI     ESOPHAGOSCOPY, GASTROSCOPY, DUODENOSCOPY (EGD), COMBINED N/A 8/20/2014    Procedure: COMBINED ESOPHAGOSCOPY, GASTROSCOPY, DUODENOSCOPY (EGD), BIOPSY SINGLE OR MULTIPLE;  Surgeon: Duane, William Charles, MD;  Location:  OR     ESOPHAGOSCOPY, GASTROSCOPY, DUODENOSCOPY (EGD), COMBINED N/A 8/22/2014    Procedure: COMBINED ESOPHAGOSCOPY, GASTROSCOPY, DUODENOSCOPY (EGD), BIOPSY SINGLE OR MULTIPLE;  Surgeon: Mello Ferrer MD;  Location:  GI     ESOPHAGOSCOPY, GASTROSCOPY, DUODENOSCOPY (EGD), COMBINED N/A 10/2/2014    Procedure: COMBINED ESOPHAGOSCOPY, GASTROSCOPY, DUODENOSCOPY (EGD), BIOPSY SINGLE OR MULTIPLE;  Surgeon: Remy Haskins MD;  Location:  GI     ESOPHAGOSCOPY, GASTROSCOPY, DUODENOSCOPY (EGD), COMBINED Left 12/15/2014    Procedure: COMBINED ESOPHAGOSCOPY, GASTROSCOPY, DUODENOSCOPY (EGD), BIOPSY SINGLE OR MULTIPLE;  Surgeon: Remy Haskins MD;  Location:  GI     ESOPHAGOSCOPY, GASTROSCOPY, DUODENOSCOPY (EGD), COMBINED  N/A 2/25/2015    Procedure: COMBINED ENDOSCOPIC ULTRASOUND, ESOPHAGOSCOPY, GASTROSCOPY, DUODENOSCOPY (EGD), FINE NEEDLE ASPIRATE/BIOPSY;  Surgeon: Clemente Lugo MD;  [facility]     ESOPHAGOSCOPY, GASTROSCOPY, DUODENOSCOPY (EGD), COMBINED Left 2/25/2015    Procedure: COMBINED ESOPHAGOSCOPY, GASTROSCOPY, DUODENOSCOPY (EGD), BIOPSY SINGLE OR MULTIPLE;  Surgeon: Clemente Lugo MD;  [facility]     ESOPHAGOSCOPY, GASTROSCOPY, DUODENOSCOPY (EGD), COMBINED N/A 9/25/2016    Procedure: COMBINED ESOPHAGOSCOPY, GASTROSCOPY, DUODENOSCOPY (EGD);  Surgeon: Aziza Patiño MD;  [facility]     ESOPHAGOSCOPY, GASTROSCOPY, DUODENOSCOPY (EGD), COMBINED N/A 1/18/2017    Procedure: COMBINED ESOPHAGOSCOPY, GASTROSCOPY, DUODENOSCOPY (EGD), BIOPSY SINGLE OR MULTIPLE;  Surgeon: Clemente Lopez MD;  [facility]     ESOPHAGOSCOPY, GASTROSCOPY, DUODENOSCOPY (EGD), COMBINED N/A 11/26/2017    Procedure: COMBINED ESOPHAGOSCOPY, GASTROSCOPY, DUODENOSCOPY (EGD), REMOVE FOREIGN BODY;  Esophagogastroduodenoscopy with foreign body extraction  ;  Surgeon: Herberth Castrejon MD;  [facility]     ESOPHAGOSCOPY, GASTROSCOPY, DUODENOSCOPY (EGD), COMBINED N/A 11/26/2017    Procedure: COMBINED ESOPHAGOSCOPY, GASTROSCOPY, DUODENOSCOPY (EGD), REMOVE FOREIGN BODY;;  Surgeon: Herberth Castrejon MD;  [facility]     FASCIOTOMY LOWER EXTREMITY Left 6/10/2016    Procedure: FASCIOTOMY LOWER EXTREMITY;  Surgeon: Mello Rodriguez MD;  [facility]     HC CAPSULE ENDOSCOPY N/A 8/25/2014    Procedure: CAPSULE/PILL CAM ENDOSCOPY;  Surgeon: Remy Haskins MD;  [facility]     HC CAPSULE ENDOSCOPY N/A 10/2/2014    Procedure: CAPSULE/PILL CAM ENDOSCOPY;  Surgeon: Remy Haskins MD;  [facility]     ORTHOPEDIC SURGERY      broken wrist repair     SEX TRANSFORMATION SURGERY, MALE TO FEMALE      1974     SINUS SURGERY      cyst removed     TONSILLECTOMY       VASCULAR SURGERY      Left carotid stent               Allergies:   Bee venom; Penicillins; Dilantin [phenytoin]; Iodide; Iodine-131; Novocaine [procaine]; and Tositumomab       Data:   All laboratory data reviewed:    LAST CHOLESTEROL:  Lab Results   Component Value Date    CHOL 155 06/06/2016     Lab Results   Component Value Date    HDL 65 06/06/2016     Lab Results   Component Value Date    LDL 77 06/06/2016     Lab Results   Component Value Date    TRIG 62 06/06/2016     Lab Results   Component Value Date    CHOLHDLRATIO 2.7 09/15/2015       LAST BMP:  Lab Results   Component Value Date     05/03/2018      Lab Results   Component Value Date    POTASSIUM 3.8 05/03/2018     Lab Results   Component Value Date    CHLORIDE 110 05/03/2018     Lab Results   Component Value Date    CAROL 8.7 05/03/2018     Lab Results   Component Value Date    CO2 28 05/03/2018     Lab Results   Component Value Date    BUN 6 05/03/2018     Lab Results   Component Value Date    CR 0.48 05/03/2018     Lab Results   Component Value Date    GLC 84 05/03/2018       LAST CBC:  Lab Results   Component Value Date    WBC 9.0 05/02/2018     Lab Results   Component Value Date    RBC 3.83 05/02/2018     Lab Results   Component Value Date    HGB 11.4 05/02/2018     Lab Results   Component Value Date    HCT 33.5 05/02/2018     Lab Results   Component Value Date    MCV 88 05/02/2018     Lab Results   Component Value Date    MCH 29.8 05/02/2018     Lab Results   Component Value Date    MCHC 34.0 05/02/2018     Lab Results   Component Value Date    RDW 14.5 05/02/2018     Lab Results   Component Value Date     05/02/2018         Thank you for allowing me to participate in the care of your patient.    Sincerely,     VICTOR M Back [facility]

## 2019-05-15 ENCOUNTER — MEDICAL CORRESPONDENCE (OUTPATIENT)
Dept: HEALTH INFORMATION MANAGEMENT | Facility: CLINIC | Age: 69
End: 2019-05-15

## 2019-05-21 ENCOUNTER — HOSPITAL ENCOUNTER (EMERGENCY)
Facility: CLINIC | Age: 69
Discharge: NURSING, REHAB OR LONG-TERM CARE | End: 2019-05-21
Attending: EMERGENCY MEDICINE | Admitting: EMERGENCY MEDICINE
Payer: COMMERCIAL

## 2019-05-21 VITALS
SYSTOLIC BLOOD PRESSURE: 114 MMHG | OXYGEN SATURATION: 96 % | RESPIRATION RATE: 16 BRPM | TEMPERATURE: 99 F | HEIGHT: 55 IN | DIASTOLIC BLOOD PRESSURE: 61 MMHG | BODY MASS INDEX: 52.09 KG/M2

## 2019-05-21 DIAGNOSIS — Z43.5 ENCOUNTER FOR SUPRAPUBIC CATHETER CARE (H): ICD-10-CM

## 2019-05-21 LAB
ALBUMIN UR-MCNC: NEGATIVE MG/DL
APPEARANCE UR: CLEAR
BILIRUB UR QL STRIP: NEGATIVE
COLOR UR AUTO: ABNORMAL
GLUCOSE UR STRIP-MCNC: NEGATIVE MG/DL
HGB UR QL STRIP: NEGATIVE
KETONES UR STRIP-MCNC: NEGATIVE MG/DL
LEUKOCYTE ESTERASE UR QL STRIP: NEGATIVE
NITRATE UR QL: NEGATIVE
PH UR STRIP: 7.5 PH (ref 5–7)
RBC #/AREA URNS AUTO: 1 /HPF (ref 0–2)
SOURCE: ABNORMAL
SP GR UR STRIP: 1.01 (ref 1–1.03)
UROBILINOGEN UR STRIP-MCNC: NORMAL MG/DL (ref 0–2)
WBC #/AREA URNS AUTO: 1 /HPF (ref 0–5)

## 2019-05-21 PROCEDURE — 51705 CHANGE OF BLADDER TUBE: CPT

## 2019-05-21 PROCEDURE — 99283 EMERGENCY DEPT VISIT LOW MDM: CPT | Mod: Z6 | Performed by: EMERGENCY MEDICINE

## 2019-05-21 PROCEDURE — 87086 URINE CULTURE/COLONY COUNT: CPT | Performed by: EMERGENCY MEDICINE

## 2019-05-21 PROCEDURE — 25000132 ZZH RX MED GY IP 250 OP 250 PS 637: Performed by: EMERGENCY MEDICINE

## 2019-05-21 PROCEDURE — 81001 URINALYSIS AUTO W/SCOPE: CPT | Performed by: EMERGENCY MEDICINE

## 2019-05-21 RX ORDER — CIPROFLOXACIN 500 MG/1
500 TABLET, FILM COATED ORAL ONCE
Status: COMPLETED | OUTPATIENT
Start: 2019-05-21 | End: 2019-05-21

## 2019-05-21 RX ADMIN — CIPROFLOXACIN HYDROCHLORIDE 500 MG: 500 TABLET, FILM COATED ORAL at 12:52

## 2019-05-21 ASSESSMENT — ENCOUNTER SYMPTOMS
DYSURIA: 0
DIFFICULTY URINATING: 1
HEMATURIA: 0

## 2019-05-21 NOTE — ED AVS SNAPSHOT
"    [facility], [place], EMERGENCY DEPARTMENT: [phone]                                              INTERAGENCY TRANSFER FORM - LAB / IMAGING / EKG / EMG RESULTS   2019                   Hospital Admission Date: 2019  SHARATH MERCEDES   : 1950  Sex: Female         Attending Provider:  Melonie Mccollum MD    Allergies:  Bee Venom, Penicillins, Dilantin [Phenytoin], Iodide, Iodine-131, Novocaine [Procaine], Tositumomab    Infection:  None   Service:  EMERGENCY ME    Ht:  1.092 m (3' 7\")   Wt:  62.1 kg (137 lb)   Admission Wt:  --    BMI:  52.09 kg/m 2   BSA:  1.37 m 2            Patient PCP Information     Provider PCP Type    Allan Casey MD General         Lab Results - 3 Days      UA with Microscopic reflex to Culture [[id number]] (Abnormal)  Resulted: 19 1236, Result status: Final result   Ordering provider:  Melonie Mccollum MD  19 1206 Resulting lab:  [facility]    Specimen Information    Type Source Collected On   Catheterized Urine Urine catheter 19 1215          Components    Component Value Reference Range Flag Lab   Color Urine Light Yellow     51   Appearance Urine Clear     51   Glucose Urine Negative NEG^Negative mg/dL   51   Bilirubin Urine Negative NEG^Negative   51   Ketones Urine Negative NEG^Negative mg/dL   51   Specific Gravity Urine 1.008 1.003 - 1.035   51   Blood Urine Negative NEG^Negative   51   pH Urine 7.5 5.0 - 7.0 pH H 51   Protein Albumin Urine Negative NEG^Negative mg/dL   51   Urobilinogen mg/dL Normal 0.0 - 2.0 mg/dL   51   Nitrite Urine Negative NEG^Negative   51   Leukocyte Esterase Urine Negative NEG^Negative   51   Source Catheterized Urine     51   WBC Urine 1 0 - 5 /HPF   51   RBC Urine 1 0 - 2 /HPF   51            Testing Performed By     Lab - Abbreviation Name Director Address Valid Date Range    51 - Unknown [facility] Unknown [address] [facility] [address] 14 1010 - " "Present            Unresulted Labs (24h ago, onward)    Start       Ordered    05/21/19 1252  Urine Culture  (ED Lab Urine Testing Adult Panel)  ROUTINE,   STAT     Comments:  For bacterial culture ONLY.  If request is for yeast or yeast surveillance, order \"Yeast Culture [id number]).      05/21/19 1251      Encounter-Level Documents:    There are no encounter-level documents.     Order-Level Documents:    There are no order-level documents.     "

## 2019-05-21 NOTE — ED AVS SNAPSHOT
[facility], [place], Emergency Department  [address] [place] [id number]  Phone:  [phone]                                    Sonya Foote   MRN: [id number]    Department:  [facility], Emergency Department   Date of Visit:  5/21/2019           After Visit Summary Signature Page    I have received my discharge instructions, and my questions have been answered. I have discussed any challenges I see with this plan with the nurse or doctor.    ..........................................................................................................................................  Patient/Patient Representative Signature      ..........................................................................................................................................  Patient Representative Print Name and Relationship to Patient    ..................................................               ................................................  Date                                   Time    ..........................................................................................................................................  Reviewed by Signature/Title    ...................................................              ..............................................  Date                                               Time          22EPIC Rev 08/18

## 2019-05-21 NOTE — ED PROVIDER NOTES
[place] EMERGENCY DEPARTMENT ([facility])  5/21/19 [facility]    History     Chief Complaint   Patient presents with     Catheter Problem     The history is provided by the patient and medical records.     Sonya Foote is a 69 year old female with history of type 2 diabetes, vasculopathy, bilateral above-knee amputations, and suprapubic catheter for functional incontinence who presents for evaluation of displaced suprapubic catheter. Nursing staff found that her suprapubic catheter was displaced, attempted to replace it twice but encountered resistance and pain.  Patient does have pain around the suprapubic catheter site as well but this is chronic and unchanged.  They sent her in for further evaluation, they were concerned that the suprapubic catheter tract has closed.  She is followed by Dr. Elizabeth Oliver of Urology, states that she has not seen them in some time. She does not normally pass urine from her urethra, but is having some urine leakage through the urethra now and her bladder feels full. No dysuria or hematuria.     I have reviewed the Medications, Allergies, Past Medical and Surgical History, and Social History in the [facility] system.  PAST MEDICAL HISTORY:   Past Medical History:   Diagnosis Date     Anemia      Antiplatelet or antithrombotic long-term use      Arthritis      AS (sickle cell trait) (H) 10/8/2013     Blind left eye      BPPV (benign paroxysmal positional vertigo)      Chronic back pain      COPD (chronic obstructive pulmonary disease) (H)      Coronary artery disease      CVA (cerebral infarction) 10/8/2012    x3, residual left sided weakness     Diastolic CHF (H) 9/4/2012     Dilantin toxicity 5/31/2013     Esophageal candidiasis (H)      GERD (gastroesophageal reflux disease)      Heart attack (H)      Hernia, abdominal      History of blood transfusion     x5     History of thrombophlebitis      HTN (hypertension) 9/4/2012     Hyperlipidemia LDL goal <100 9/4/2012     Legally  blind in right eye, as defined in [place]     No periphal vision right eye     Osteoporosis 1/21/2013     Other chronic pain      PAD (peripheral artery disease) (H)      Palpitations      Person who has had sex change operation 1/20/2014     Pneumonia      Schizoaffective disorder (H)      Seizure disorder (H) 9/4/2012     Sleep apnea     CPAP     Thrombosis of leg      Type 2 diabetes, HbA1C goal < 8% (H) 9/4/2012     Uncomplicated asthma      Unspecified cerebral artery occlusion with cerebral infarction        PAST SURGICAL HISTORY:   Past Surgical History:   Procedure Laterality Date     AMPUTATE LEG ABOVE KNEE Left 6/11/2016    Procedure: AMPUTATE LEG ABOVE KNEE;  Surgeon: Mello Rodriguez MD;  [facility]     AMPUTATE LEG BELOW KNEE Right 11/7/2016    Procedure: AMPUTATE LEG BELOW KNEE;  Surgeon: Savannah Durant MD;  [facility]     AMPUTATE REVISION STUMP LOWER EXTREMITY Right 11/11/2016    Procedure: AMPUTATE REVISION STUMP LOWER EXTREMITY;  Surgeon: Savannah Durant MD;  [facility]     AMPUTATE REVISION STUMP LOWER EXTREMITY Right 11/16/2016    Procedure: AMPUTATE REVISION STUMP LOWER EXTREMITY;  Surgeon: Savannah Durant MD;  [facility]     AMPUTATE TOE(S) Right 1/5/2016    Procedure: AMPUTATE TOE(S);  Surgeon: Mello Gaines DPM;  Location:  [facility]     ANGIOGRAM Bilateral 11/21/2014    Procedure: ANGIOGRAM;  Surgeon: Savannah Durant MD;  [facility]     ANGIOGRAM Left 1/16/2015    Procedure: ANGIOGRAM;  Surgeon: Savannah Durant MD;  [facility]     ANGIOGRAM Bilateral 9/14/2015    Procedure: ANGIOGRAM;  Surgeon: Savannah Durant MD;  [facility]     ANGIOGRAM Left 10/12/2015    Procedure: ANGIOGRAM;  Surgeon: Savannah Durant MD;  [facility]     ANGIOGRAM Right 6/6/2016    Procedure: ANGIOGRAM;  Surgeon: Savannah Durant MD;  [facility]     ANGIOPLASTY Right 6/6/2016    Procedure: ANGIOPLASTY;  Surgeon: Savannah Durant MD;  [facility]     APPENDECTOMY       BREAST SURGERY       right breast bx (benign)     CATARACT IOL, RT/LT Right      CHOLECYSTECTOMY       COLONOSCOPY N/A 8/25/2014    Procedure: COLONOSCOPY;  Surgeon: Mello Ferrer MD;  [facility]     COLONOSCOPY WITH CO2 INSUFFLATION N/A 8/20/2014    Procedure: COLONOSCOPY WITH CO2 INSUFFLATION;  Surgeon: Duane, William Charles, MD;  Location: [facility]     CYSTOSTOMY, INSERT TUBE SUPRAPUBIC, COMBINED N/A 1/16/2018    Procedure: COMBINED CYSTOSTOMY, INSERT TUBE SUPRAPUBIC;  Cystoscopy, Intraoperative Ultrasound, Suprapubic Tube Placement;  Surgeon: Keanu Dawson MD;  [facility]     ENDARTERECTOMY FEMORAL  5/23/2014    Procedure: ENDARTERECTOMY FEMORAL;  Surgeon: Jason Joshi MD;  [facility]     ESOPHAGOSCOPY, GASTROSCOPY, DUODENOSCOPY (EGD), COMBINED  12/14/2012    Procedure: COMBINED ESOPHAGOSCOPY, GASTROSCOPY, DUODENOSCOPY (EGD), BIOPSY SINGLE OR MULTIPLE;  ESOPHAGOSCOPY, GASTROSCOPY, DUODENOSCOPY (EGD), DILATATION ;  Surgeon: Elizabeth Stevenson MD;  Location:  GI     ESOPHAGOSCOPY, GASTROSCOPY, DUODENOSCOPY (EGD), COMBINED  12/31/2013    Procedure: COMBINED ESOPHAGOSCOPY, GASTROSCOPY, DUODENOSCOPY (EGD), BIOPSY SINGLE OR MULTIPLE;;  Surgeon: Clemente Lopez MD;  [facility]     ESOPHAGOSCOPY, GASTROSCOPY, DUODENOSCOPY (EGD), COMBINED  4/1/2014    Procedure: COMBINED ESOPHAGOSCOPY, GASTROSCOPY, DUODENOSCOPY (EGD);;  Surgeon: Clemente Lopez MD;  [facility]     ESOPHAGOSCOPY, GASTROSCOPY, DUODENOSCOPY (EGD), COMBINED  6/28/2014    Procedure: COMBINED ESOPHAGOSCOPY, GASTROSCOPY, DUODENOSCOPY (EGD);  Surgeon: Clemente Lopez MD;  [facility]     ESOPHAGOSCOPY, GASTROSCOPY, DUODENOSCOPY (EGD), COMBINED N/A 8/20/2014    Procedure: COMBINED ESOPHAGOSCOPY, GASTROSCOPY, DUODENOSCOPY (EGD), BIOPSY SINGLE OR MULTIPLE;  Surgeon: Duane, William Charles, MD;  Location: [facility]     ESOPHAGOSCOPY, GASTROSCOPY, DUODENOSCOPY (EGD), COMBINED N/A 8/22/2014    Procedure: COMBINED ESOPHAGOSCOPY, GASTROSCOPY,  DUODENOSCOPY (EGD), BIOPSY SINGLE OR MULTIPLE;  Surgeon: Mello Ferrer MD;  [facility]     ESOPHAGOSCOPY, GASTROSCOPY, DUODENOSCOPY (EGD), COMBINED N/A 10/2/2014    Procedure: COMBINED ESOPHAGOSCOPY, GASTROSCOPY, DUODENOSCOPY (EGD), BIOPSY SINGLE OR MULTIPLE;  Surgeon: Remy Haskins MD;  [facility]     ESOPHAGOSCOPY, GASTROSCOPY, DUODENOSCOPY (EGD), COMBINED Left 12/15/2014    Procedure: COMBINED ESOPHAGOSCOPY, GASTROSCOPY, DUODENOSCOPY (EGD), BIOPSY SINGLE OR MULTIPLE;  Surgeon: Remy Haskins MD;  [facility]     ESOPHAGOSCOPY, GASTROSCOPY, DUODENOSCOPY (EGD), COMBINED N/A 2/25/2015    Procedure: COMBINED ENDOSCOPIC ULTRASOUND, ESOPHAGOSCOPY, GASTROSCOPY, DUODENOSCOPY (EGD), FINE NEEDLE ASPIRATE/BIOPSY;  Surgeon: Clemente Lugo MD;  [facility]     ESOPHAGOSCOPY, GASTROSCOPY, DUODENOSCOPY (EGD), COMBINED Left 2/25/2015    Procedure: COMBINED ESOPHAGOSCOPY, GASTROSCOPY, DUODENOSCOPY (EGD), BIOPSY SINGLE OR MULTIPLE;  Surgeon: Clemente Lugo MD;  [facility]     ESOPHAGOSCOPY, GASTROSCOPY, DUODENOSCOPY (EGD), COMBINED N/A 9/25/2016    Procedure: COMBINED ESOPHAGOSCOPY, GASTROSCOPY, DUODENOSCOPY (EGD);  Surgeon: Aziza Patiño MD;  [facility]     ESOPHAGOSCOPY, GASTROSCOPY, DUODENOSCOPY (EGD), COMBINED N/A 1/18/2017    Procedure: COMBINED ESOPHAGOSCOPY, GASTROSCOPY, DUODENOSCOPY (EGD), BIOPSY SINGLE OR MULTIPLE;  Surgeon: Clemente Lopez MD;  [facility]     ESOPHAGOSCOPY, GASTROSCOPY, DUODENOSCOPY (EGD), COMBINED N/A 11/26/2017    Procedure: COMBINED ESOPHAGOSCOPY, GASTROSCOPY, DUODENOSCOPY (EGD), REMOVE FOREIGN BODY;  Esophagogastroduodenoscopy with foreign body extraction  ;  Surgeon: Herberth Castrejon MD;  [facility]     ESOPHAGOSCOPY, GASTROSCOPY, DUODENOSCOPY (EGD), COMBINED N/A 11/26/2017    Procedure: COMBINED ESOPHAGOSCOPY, GASTROSCOPY, DUODENOSCOPY (EGD), REMOVE FOREIGN BODY;;  Surgeon: Herberth Castrejon MD;  Location:  GI      FASCIOTOMY LOWER EXTREMITY Left 6/10/2016    Procedure: FASCIOTOMY LOWER EXTREMITY;  Surgeon: Mello Rodriguez MD;  [facility]     HC CAPSULE ENDOSCOPY N/A 2014    Procedure: CAPSULE/PILL CAM ENDOSCOPY;  Surgeon: Remy Haskins MD;  [facility]     HC CAPSULE ENDOSCOPY N/A 10/2/2014    Procedure: CAPSULE/PILL CAM ENDOSCOPY;  Surgeon: Remy Haskins MD;  [facility]     ORTHOPEDIC SURGERY      broken wrist repair     SEX TRANSFORMATION SURGERY, MALE TO FEMALE      1974     SINUS SURGERY      cyst removed     TONSILLECTOMY       VASCULAR SURGERY      Left carotid stent       FAMILY HISTORY:   Family History   Problem Relation Age of Onset     Dementia Mother      Glaucoma Mother      Diabetes Mother         may have been type 1, childhood     Coronary Artery Disease Mother         MI     Glaucoma Father      Diabetes Father         may hev been type 1 - ??     Heart Failure Father      Cancer Maternal Aunt         leukemia     Schizophrenia Brother      Depression Brother      Suicide Sister      Depression Sister      Diabetes Sister      Cancer Maternal Aunt         ovarian     Glaucoma Maternal Grandmother      Diabetes Maternal Grandmother      Glaucoma Maternal Grandfather      Diabetes Maternal Grandfather      Glaucoma Paternal Grandmother      Diabetes Paternal Grandmother      Glaucoma Paternal Grandfather      Diabetes Paternal Grandfather      Breast Cancer Sister      Cerebrovascular Disease Brother      Colon Cancer No family hx of      Macular Degeneration No family hx of        SOCIAL HISTORY:   Social History     Tobacco Use     Smoking status: Current Every Day Smoker     Packs/day: 0.25     Years: 30.00     Pack years: 7.50     Types: Cigarettes, Cigars     Last attempt to quit: 2001     Years since quittin.5     Smokeless tobacco: Never Used   Substance Use Topics     Alcohol use: No     Alcohol/week: 0.0 oz       Patient's Medications   New Prescriptions     No medications on file   Previous Medications    ACETAMINOPHEN PO    Take 1,000 mg by mouth every 6 hours as needed for pain Not to exceed 4000 mg/day    ADVAIR DISKUS 250-50 MCG/DOSE DISKUS INHALER    Inhale 1 puff into the lungs 2 times daily    ALBUTEROL (2.5 MG/3ML) 0.083% NEB SOLUTION    INHALE 1 VIAL VIA NEBULIZER EVERY 6 HOURS AS NEEDED    ALENDRONATE (FOSAMAX) 70 MG TABLET    Take 1 tablet (70 mg) by mouth with 8oz water every 7 days 30 minutes before breakfast and remain upright during this time.    ASPIRIN EC 81 MG EC TABLET    Take 1 tablet (81 mg) by mouth daily    ATORVASTATIN (LIPITOR) 40 MG TABLET    Take 1 tablet (40 mg) by mouth daily    BLOOD GLUCOSE MONITORING (ONE TOUCH ULTRA 2) METER DEVICE KIT    Use to test blood sugars 3 times daily or as directed.    BLOOD GLUCOSE MONITORING (ONE TOUCH ULTRA) TEST STRIP    Use to test blood sugars 3 times daily or as directed.    BLOOD GLUCOSE MONITORING (ONE TOUCH ULTRASOFT) LANCETS    Use to test blood sugar 3 times daily or as directed.    BRIMONIDINE (ALPHAGAN) 0.2 % OPHTHALMIC SOLUTION    Place 1 drop into the right eye 2 times daily    CALCIUM CITRATE-VITAMIN D (CITRACAL) 315-250 MG-UNIT TABS    Take 2 tablets by mouth daily    CETIRIZINE (ZYRTEC) 10 MG TABLET    Take 1 tablet (10 mg) by mouth every evening    CHOLECALCIFEROL (VITAMIN D) 2000 UNITS TABLET    Take 2,000 Units by mouth daily.    CLOTRIMAZOLE (LOTRIMIN) 1 % CREAM    INSERT 1 APPLICATORFUL VAGINALLY TWICE A DAY FOR VAGINAL PAIN    CYANOCOBALAMIN PO    Take 2,000 mcg by mouth daily    DICLOFENAC (VOLTAREN) 1 % GEL TOPICAL GEL    Apply 2 g topically 4 times daily to hands    DORZOLAMIDE (TRUSOPT) 2 % OPHTHALMIC SOLUTION    Place 1 drop into the right eye 2 times daily    EPINEPHRINE (EPIPEN/ADRENACLICK/OR ANY BX GENERIC EQUIV) 0.3 MG/0.3ML INJECTION 2-PACK    Inject 0.3 mLs (0.3 mg) into the muscle as needed for anaphylaxis    ESCITALOPRAM (LEXAPRO) 10 MG TABLET    Take 10 mg by mouth  daily     FERROUS SULFATE (IRON) 325 (65 FE) MG TABLET    Take 1 tablet (325 mg) by mouth 2 times daily With meals    FLUTICASONE (FLONASE) 50 MCG/ACT NASAL SPRAY    Spray 1 spray into both nostrils daily    LACTULOSE (CHRONULAC) 10 GM/15ML SOLUTION    Take 20 g by mouth as needed for constipation    LATANOPROST (XALATAN) 0.005 % OPHTHALMIC SOLUTION    Place 1 drop into both eyes At Bedtime    LEVETIRACETAM (KEPPRA) 500 MG TABLET    Take 1 tablet (500 mg) by mouth 2 times daily    LIDOCAINE (LIDODERM) 5 % PATCH    APPLY 1 TO 3 PATCHES TO PAINFUL AREA AT ONCE FOR UP TO 12 HOURS WITHIN A 24 HOUR PERIOD REMOVE AFTER 12 HOURS    LISINOPRIL (PRINIVIL/ZESTRIL) 20 MG TABLET    TAKE 1 TABLET BY MOUTH DAILY DX:HYPERTENSION    METFORMIN (GLUCOPHAGE-XR) 500 MG 24 HR TABLET    Take 1 tablet (500 mg) by mouth daily (with dinner)    METOPROLOL SUCCINATE (TOPROL-XL) 50 MG 24 HR TABLET    TAKE 1 TABLET BY MOUTH DAILY    MULTIPLE VITAMIN (MULTIVITAMIN OR)    Take 1 tablet by mouth daily     NITROGLYCERIN (NITROSTAT) 0.4 MG SL TABLET    Place 1 tablet (0.4 mg) under the tongue every 5 minutes as needed for chest pain if you are still having symptoms after 3 doses (15 minutes) call 911.    ONDANSETRON (ZOFRAN-ODT) 8 MG ODT TAB    Take 1 tablet (8 mg) by mouth every 8 hours as needed    ORDER FOR DME    Equipment being ordered: Depends briefs    ORDER FOR DME    Equipment being ordered: Power Wheelchair    ORDER FOR DME    Equipment being ordered: Josue daily shakes with each meal    ORDER FOR DME    Equipment being ordered: Nebulizer and tubing supplies    ORDER FOR DME    Equipment being ordered: CPAP supplies mask, hose, filters, cushion    fax to [facility] at [phone]    ORDER FOR DME    Equipment being ordered: CPAP supplies mask, hose, filters, cushion fax to [facility] at [phone]  Disp: 10 Device Refills: prn   Class: Local Print Start: 2/10/2017    ORDER FOR DME    Hospital bed with side rails    ORDER FOR  DME    Full electric hospital bed with half rails    Dx: K28751, I110, J449  Length of need: lifetime    ORDER FOR DME    Wheel Chair Cushion: 18 x 18 inch Roho cushion    ORDER FOR DME    Equipment being ordered: bandage tape, prefer paper    ORDER FOR DME    Equipment being ordered: Power Wheel Chair    ORDER FOR DME    Equipment being ordered: Hospital Bed with side rails    PANTOPRAZOLE (PROTONIX) 40 MG EC TABLET    Take 1 tablet (40 mg) by mouth daily    PHENYTOIN 200 MG CAPS    Take 200 mg by mouth 2 times daily    POLYETHYLENE GLYCOL (MIRALAX/GLYCOLAX) PACKET    Take 17 g by mouth daily Dissolved in water or juice     PREGABALIN (LYRICA) 75 MG CAPSULE    Take 2 capsules (150 mg) by mouth 2 times daily    RISPERIDONE (RISPERDAL) 0.5 MG TABLET    Take 0.5 mg by mouth At Bedtime    SENNOSIDES (SENOKOT) 8.6 MG TABLET    Take 1 tablet by mouth 2 times daily    SOLIFENACIN (VESICARE) 10 MG TABLET    Take 1 tablet (10 mg) by mouth daily    SUCRALFATE (CARAFATE) 1 GM TABLET    Take 1 tablet (1 g) by mouth 4 times daily May dissolve in 10 mL water is necessary. (Start upon completion of carafate suspension)    TRAZODONE (DESYREL) 50 MG TABLET    Take 150 mg by mouth At Bedtime     UMECLIDINIUM (INCRUSE ELLIPTA) 62.5 MCG/INH ORAL INHALER    Inhale 1 puff into the lungs daily    VENTOLIN  (90 BASE) MCG/ACT INHALER    Inhale 2 puffs into the lungs every 6 hours as needed for shortness of breath / dyspnea or wheezing    ZINC 50 MG TABS    Take 1 tablet by mouth daily   Modified Medications    No medications on file   Discontinued Medications    No medications on file          Allergies   Allergen Reactions     Bee Venom      Penicillins Anaphylaxis     Other reaction(s): Skin Rash and/or Hives     Dilantin [Phenytoin] Other (See Comments)     Generic dilantin only per pt     Iodide Hives     09/11/15: Pt states she can use iodine and doesn't have any problems      Iodine-131      Novocaine [Procaine] Hives      "Other reaction(s): Skin Rash and/or Hives     Tositumomab       Review of Systems   Genitourinary: Positive for difficulty urinating (displaced suprapubic catheter) and urgency. Negative for dysuria and hematuria.   All other systems reviewed and are negative.    Physical Exam   BP: 106/65  Heart Rate: 84  Temp: 99.3  F (37.4  C)  Resp: 16  Height: 109.2 cm (3' 7\")  SpO2: 98 %    Physical Exam   Constitutional: She is oriented to person, place, and time. No distress.   HENT:   Head: Normocephalic and atraumatic.   Nose: Nose normal.   Eyes: Conjunctivae are normal. No scleral icterus.   Neck: Normal range of motion. Neck supple.   Cardiovascular: Normal rate.   Pulmonary/Chest: Effort normal. No respiratory distress.   Abdominal: Soft. She exhibits distension (mild suprapubic). There is tenderness in the suprapubic area. There is no rigidity, no rebound and no guarding.       Suprapubic tube site in midline lower abdomen. Trace clear fluid drainage with granulation tissue in tract. Minimally tender without fluctuance or skin changes.   Neurological: She is alert and oriented to person, place, and time.   Skin: Skin is warm and dry. She is not diaphoretic.   Psychiatric: She has a normal mood and affect. Her behavior is normal.   Nursing note and vitals reviewed.      ED Course        Procedures             Critical Care time:  none             Labs Ordered and Resulted from Time of ED Arrival Up to the Time of Departure from the ED   ROUTINE UA WITH MICROSCOPIC REFLEX TO CULTURE            Assessments & Plan (with Medical Decision Making)   Sonya Foote is a 69 year old female with history of type 2 diabetes, vasculopathy, bilateral above-knee amputations, and suprapubic catheter for functional incontinence who presents for evaluation of displaced suprapubic catheter.    Ddx: tract closure, UTI, urinary retention    Urology consulted and placed a small caliber suprapubic catheter in the ED. They recommended one dose " PO cipro for periprocedural PPX in setting of recent pos urine culture. They also recommended sending a UA and urine culture but not treating. They will arrange follow up in clinic in 7-10 days for upsizing cath. Ok for discharge to NH.     I have reviewed the nursing notes.    I have reviewed the findings, diagnosis, plan and need for follow up with the patient.       Medication List      There are no discharge medications for this visit.         Final diagnoses:   Encounter for suprapubic catheter care (H)   I, Alessandra Alexandra, am serving as a trained medical scribe to document services personally performed by Melonie Mccollum MD based on the provider's statements to me on May 21, 2019.  This document has been checked and approved by the attending provider.    I, Melonie Mccollum MD, was physically present and have reviewed and verified the accuracy of this note documented by Alessandra Alexandra, medical scribe.       5/21/2019   [facility], [place], EMERGENCY DEPARTMENT     Melonie Mccollum MD  05/21/19 2010

## 2019-05-21 NOTE — CONSULTS
Urology Consult History and Physical    Name: Sonya Foote    MRN: [id number]   YOB: 1950       We were asked to see Sonya Foote at the request of Dr. Mccollum, ED for evaluation and treatment of the following chief complaint.          Chief Complaint:   - Dislodged SP tube  History is obtained from patient and review of records          History of Present Illness:   Sonya Foote is a 69 year old female with PMH significant for coronary disease s/p MI, CVA and Bilateral AKA (d/t vascular disease) as well as COPD, DM II, SZD, schizoaffective disorder.  From a Urologic standpoint she is followed by Dr. Oliver in [place] and was last seen on 7/5/18.  She has a chronic SPT and lives in a nursing facility where the tube is changed every 2 weeks d/t frequent clogs.  Nurses occasionally need to irrigate.  Lately it has been more difficult for the nurses to change the tube.      She tells me that yesterday the tube became dislodged.  She is not sure how it happened.  She doesn't recall trauma.  Overnight her bladder pain, which is chronic and longstanding, has become worse and she has developed worsened incontinence per urethra.  She presented to the ED today for assistance.  She denies fevers but often feels chilly.          Past Medical History:     Past Medical History:   Diagnosis Date     Anemia      Antiplatelet or antithrombotic long-term use      Arthritis      AS (sickle cell trait) (H) 10/8/2013     Blind left eye      BPPV (benign paroxysmal positional vertigo)      Chronic back pain      COPD (chronic obstructive pulmonary disease) (H)      Coronary artery disease      CVA (cerebral infarction) 10/8/2012    x3, residual left sided weakness     Diastolic CHF (H) 9/4/2012     Dilantin toxicity 5/31/2013     Esophageal candidiasis (H)      GERD (gastroesophageal reflux disease)      Heart attack (H)      Hernia, abdominal      History of blood transfusion     x5     History of thrombophlebitis      HTN  (hypertension) 9/4/2012     Hyperlipidemia LDL goal <100 9/4/2012     Legally blind in right eye, as defined in [place]     No periphal vision right eye     Osteoporosis 1/21/2013     Other chronic pain      PAD (peripheral artery disease) (H)      Palpitations      Person who has had sex change operation 1/20/2014     Pneumonia      Schizoaffective disorder (H)      Seizure disorder (H) 9/4/2012     Sleep apnea     CPAP     Thrombosis of leg      Type 2 diabetes, HbA1C goal < 8% (H) 9/4/2012     Uncomplicated asthma      Unspecified cerebral artery occlusion with cerebral infarction             Past Surgical History:     Past Surgical History:   Procedure Laterality Date     AMPUTATE LEG ABOVE KNEE Left 6/11/2016    Procedure: AMPUTATE LEG ABOVE KNEE;  Surgeon: Mello Rodriguez MD;  [facility]     AMPUTATE LEG BELOW KNEE Right 11/7/2016    Procedure: AMPUTATE LEG BELOW KNEE;  Surgeon: Savannah Durant MD;  [facility]     AMPUTATE REVISION STUMP LOWER EXTREMITY Right 11/11/2016    Procedure: AMPUTATE REVISION STUMP LOWER EXTREMITY;  Surgeon: Savannah Durant MD;  [facility]     AMPUTATE REVISION STUMP LOWER EXTREMITY Right 11/16/2016    Procedure: AMPUTATE REVISION STUMP LOWER EXTREMITY;  Surgeon: Savannah Durant MD;  [facility]     AMPUTATE TOE(S) Right 1/5/2016    Procedure: AMPUTATE TOE(S);  Surgeon: Mello Gaines DPM;  Location:  [facility]     ANGIOGRAM Bilateral 11/21/2014    Procedure: ANGIOGRAM;  Surgeon: Savannah Durant MD;  [facility]     ANGIOGRAM Left 1/16/2015    Procedure: ANGIOGRAM;  Surgeon: Savannah Durant MD;  [facility]     ANGIOGRAM Bilateral 9/14/2015    Procedure: ANGIOGRAM;  Surgeon: Savannah Durant MD;  [facility]     ANGIOGRAM Left 10/12/2015    Procedure: ANGIOGRAM;  Surgeon: Savannah Durant MD;  [facility]     ANGIOGRAM Right 6/6/2016    Procedure: ANGIOGRAM;  Surgeon: Savannah Durant MD;  [facility]     ANGIOPLASTY Right 6/6/2016    Procedure: ANGIOPLASTY;   Surgeon: Savannah Durant MD;  [facility]     APPENDECTOMY       BREAST SURGERY      right breast bx (benign)     CATARACT IOL, RT/LT Right      CHOLECYSTECTOMY       COLONOSCOPY N/A 8/25/2014    Procedure: COLONOSCOPY;  Surgeon: Mello Ferrer MD;  [facility]     COLONOSCOPY WITH CO2 INSUFFLATION N/A 8/20/2014    Procedure: COLONOSCOPY WITH CO2 INSUFFLATION;  Surgeon: Duane, William Charles, MD;  [facility]     CYSTOSTOMY, INSERT TUBE SUPRAPUBIC, COMBINED N/A 1/16/2018    Procedure: COMBINED CYSTOSTOMY, INSERT TUBE SUPRAPUBIC;  Cystoscopy, Intraoperative Ultrasound, Suprapubic Tube Placement;  Surgeon: Keanu Dawson MD;  [facility]     ENDARTERECTOMY FEMORAL  5/23/2014    Procedure: ENDARTERECTOMY FEMORAL;  Surgeon: Jason Joshi MD;  [facility]     ESOPHAGOSCOPY, GASTROSCOPY, DUODENOSCOPY (EGD), COMBINED  12/14/2012    Procedure: COMBINED ESOPHAGOSCOPY, GASTROSCOPY, DUODENOSCOPY (EGD), BIOPSY SINGLE OR MULTIPLE;  ESOPHAGOSCOPY, GASTROSCOPY, DUODENOSCOPY (EGD), DILATATION ;  Surgeon: Elizabeth Stevenson MD;  Location:  GI     ESOPHAGOSCOPY, GASTROSCOPY, DUODENOSCOPY (EGD), COMBINED  12/31/2013    Procedure: COMBINED ESOPHAGOSCOPY, GASTROSCOPY, DUODENOSCOPY (EGD), BIOPSY SINGLE OR MULTIPLE;;  Surgeon: Clemente Lopez MD;  Location:  GI     ESOPHAGOSCOPY, GASTROSCOPY, DUODENOSCOPY (EGD), COMBINED  4/1/2014    Procedure: COMBINED ESOPHAGOSCOPY, GASTROSCOPY, DUODENOSCOPY (EGD);;  Surgeon: Clemente Lopez MD;  Location:  GI     ESOPHAGOSCOPY, GASTROSCOPY, DUODENOSCOPY (EGD), COMBINED  6/28/2014    Procedure: COMBINED ESOPHAGOSCOPY, GASTROSCOPY, DUODENOSCOPY (EGD);  Surgeon: Clemente Lopez MD;  Location:  GI     ESOPHAGOSCOPY, GASTROSCOPY, DUODENOSCOPY (EGD), COMBINED N/A 8/20/2014    Procedure: COMBINED ESOPHAGOSCOPY, GASTROSCOPY, DUODENOSCOPY (EGD), BIOPSY SINGLE OR MULTIPLE;  Surgeon: Duane, William Charles, MD;  [facility]     ESOPHAGOSCOPY, GASTROSCOPY,  DUODENOSCOPY (EGD), COMBINED N/A 8/22/2014    Procedure: COMBINED ESOPHAGOSCOPY, GASTROSCOPY, DUODENOSCOPY (EGD), BIOPSY SINGLE OR MULTIPLE;  Surgeon: Mello Ferrer MD;  [facility]     ESOPHAGOSCOPY, GASTROSCOPY, DUODENOSCOPY (EGD), COMBINED N/A 10/2/2014    Procedure: COMBINED ESOPHAGOSCOPY, GASTROSCOPY, DUODENOSCOPY (EGD), BIOPSY SINGLE OR MULTIPLE;  Surgeon: Remy Haskins MD;  [facility]     ESOPHAGOSCOPY, GASTROSCOPY, DUODENOSCOPY (EGD), COMBINED Left 12/15/2014    Procedure: COMBINED ESOPHAGOSCOPY, GASTROSCOPY, DUODENOSCOPY (EGD), BIOPSY SINGLE OR MULTIPLE;  Surgeon: Remy Haskins MD;  [facility]     ESOPHAGOSCOPY, GASTROSCOPY, DUODENOSCOPY (EGD), COMBINED N/A 2/25/2015    Procedure: COMBINED ENDOSCOPIC ULTRASOUND, ESOPHAGOSCOPY, GASTROSCOPY, DUODENOSCOPY (EGD), FINE NEEDLE ASPIRATE/BIOPSY;  Surgeon: Clemente Lugo MD;  [facility]     ESOPHAGOSCOPY, GASTROSCOPY, DUODENOSCOPY (EGD), COMBINED Left 2/25/2015    Procedure: COMBINED ESOPHAGOSCOPY, GASTROSCOPY, DUODENOSCOPY (EGD), BIOPSY SINGLE OR MULTIPLE;  Surgeon: Clemente Lugo MD;  [facility]     ESOPHAGOSCOPY, GASTROSCOPY, DUODENOSCOPY (EGD), COMBINED N/A 9/25/2016    Procedure: COMBINED ESOPHAGOSCOPY, GASTROSCOPY, DUODENOSCOPY (EGD);  Surgeon: Aziza Patiño MD;  [facility]     ESOPHAGOSCOPY, GASTROSCOPY, DUODENOSCOPY (EGD), COMBINED N/A 1/18/2017    Procedure: COMBINED ESOPHAGOSCOPY, GASTROSCOPY, DUODENOSCOPY (EGD), BIOPSY SINGLE OR MULTIPLE;  Surgeon: Clemente Lopez MD;  [facility]     ESOPHAGOSCOPY, GASTROSCOPY, DUODENOSCOPY (EGD), COMBINED N/A 11/26/2017    Procedure: COMBINED ESOPHAGOSCOPY, GASTROSCOPY, DUODENOSCOPY (EGD), REMOVE FOREIGN BODY;  Esophagogastroduodenoscopy with foreign body extraction  ;  Surgeon: Herberth Castrejon MD;  [facility]     ESOPHAGOSCOPY, GASTROSCOPY, DUODENOSCOPY (EGD), COMBINED N/A 11/26/2017    Procedure: COMBINED ESOPHAGOSCOPY, GASTROSCOPY,  DUODENOSCOPY (EGD), REMOVE FOREIGN BODY;;  Surgeon: Herberth Castrejon MD;  [facility]     FASCIOTOMY LOWER EXTREMITY Left 6/10/2016    Procedure: FASCIOTOMY LOWER EXTREMITY;  Surgeon: Mello Rodriguez MD;  [facility]     HC CAPSULE ENDOSCOPY N/A 2014    Procedure: CAPSULE/PILL CAM ENDOSCOPY;  Surgeon: Remy Haskins MD;  [facility]     HC CAPSULE ENDOSCOPY N/A 10/2/2014    Procedure: CAPSULE/PILL CAM ENDOSCOPY;  Surgeon: Remy Haskins MD;  [facility]     ORTHOPEDIC SURGERY      broken wrist repair     SEX TRANSFORMATION SURGERY, MALE TO FEMALE      1974     SINUS SURGERY      cyst removed     TONSILLECTOMY       VASCULAR SURGERY      Left carotid stent            Social History:     Social History     Tobacco Use     Smoking status: Current Every Day Smoker     Packs/day: 0.25     Years: 30.00     Pack years: 7.50     Types: Cigarettes, Cigars     Last attempt to quit: 2001     Years since quittin.5     Smokeless tobacco: Never Used   Substance Use Topics     Alcohol use: No     Alcohol/week: 0.0 oz            Family History:     Family History   Problem Relation Age of Onset     Dementia Mother      Glaucoma Mother      Diabetes Mother         may have been type 1, childhood     Coronary Artery Disease Mother         MI     Glaucoma Father      Diabetes Father         may hev been type 1 - ??     Heart Failure Father      Cancer Maternal Aunt         leukemia     Schizophrenia Brother      Depression Brother      Suicide Sister      Depression Sister      Diabetes Sister      Cancer Maternal Aunt         ovarian     Glaucoma Maternal Grandmother      Diabetes Maternal Grandmother      Glaucoma Maternal Grandfather      Diabetes Maternal Grandfather      Glaucoma Paternal Grandmother      Diabetes Paternal Grandmother      Glaucoma Paternal Grandfather      Diabetes Paternal Grandfather      Breast Cancer Sister      Cerebrovascular Disease Brother      Colon  Cancer No family hx of      Macular Degeneration No family hx of             Allergies:     Allergies   Allergen Reactions     Bee Venom      Penicillins Anaphylaxis     Other reaction(s): Skin Rash and/or Hives     Dilantin [Phenytoin] Other (See Comments)     Generic dilantin only per pt     Iodide Hives     09/11/15: Pt states she can use iodine and doesn't have any problems      Iodine-131      Novocaine [Procaine] Hives     Other reaction(s): Skin Rash and/or Hives     Tositumomab             Medications:     No current facility-administered medications for this encounter.      Current Outpatient Medications   Medication Sig     ACETAMINOPHEN PO Take 1,000 mg by mouth every 6 hours as needed for pain Not to exceed 4000 mg/day     ADVAIR DISKUS 250-50 MCG/DOSE diskus inhaler Inhale 1 puff into the lungs 2 times daily     albuterol (2.5 MG/3ML) 0.083% neb solution INHALE 1 VIAL VIA NEBULIZER EVERY 6 HOURS AS NEEDED     alendronate (FOSAMAX) 70 MG tablet Take 1 tablet (70 mg) by mouth with 8oz water every 7 days 30 minutes before breakfast and remain upright during this time.     aspirin EC 81 MG EC tablet Take 1 tablet (81 mg) by mouth daily     atorvastatin (LIPITOR) 40 MG tablet Take 1 tablet (40 mg) by mouth daily     blood glucose monitoring (ONE TOUCH ULTRA 2) meter device kit Use to test blood sugars 3 times daily or as directed.     blood glucose monitoring (ONE TOUCH ULTRA) test strip Use to test blood sugars 3 times daily or as directed.     blood glucose monitoring (ONE TOUCH ULTRASOFT) lancets Use to test blood sugar 3 times daily or as directed.     brimonidine (ALPHAGAN) 0.2 % ophthalmic solution Place 1 drop into the right eye 2 times daily     calcium citrate-vitamin D (CITRACAL) 315-250 MG-UNIT TABS Take 2 tablets by mouth daily     cetirizine (ZYRTEC) 10 MG tablet Take 1 tablet (10 mg) by mouth every evening     Cholecalciferol (VITAMIN D) 2000 UNITS tablet Take 2,000 Units by mouth daily.      clotrimazole (LOTRIMIN) 1 % cream INSERT 1 APPLICATORFUL VAGINALLY TWICE A DAY FOR VAGINAL PAIN     CYANOCOBALAMIN PO Take 2,000 mcg by mouth daily     diclofenac (VOLTAREN) 1 % GEL topical gel Apply 2 g topically 4 times daily to hands     dorzolamide (TRUSOPT) 2 % ophthalmic solution Place 1 drop into the right eye 2 times daily     EPINEPHrine (EPIPEN/ADRENACLICK/OR ANY BX GENERIC EQUIV) 0.3 MG/0.3ML injection 2-pack Inject 0.3 mLs (0.3 mg) into the muscle as needed for anaphylaxis     escitalopram (LEXAPRO) 10 MG tablet Take 10 mg by mouth daily      ferrous sulfate (IRON) 325 (65 FE) MG tablet Take 1 tablet (325 mg) by mouth 2 times daily With meals     fluticasone (FLONASE) 50 MCG/ACT nasal spray Spray 1 spray into both nostrils daily     lactulose (CHRONULAC) 10 GM/15ML solution Take 20 g by mouth as needed for constipation     latanoprost (XALATAN) 0.005 % ophthalmic solution Place 1 drop into both eyes At Bedtime     levETIRAcetam (KEPPRA) 500 MG tablet Take 1 tablet (500 mg) by mouth 2 times daily     lidocaine (LIDODERM) 5 % Patch APPLY 1 TO 3 PATCHES TO PAINFUL AREA AT ONCE FOR UP TO 12 HOURS WITHIN A 24 HOUR PERIOD REMOVE AFTER 12 HOURS     lisinopril (PRINIVIL/ZESTRIL) 20 MG tablet TAKE 1 TABLET BY MOUTH DAILY DX:HYPERTENSION     metFORMIN (GLUCOPHAGE-XR) 500 MG 24 hr tablet Take 1 tablet (500 mg) by mouth daily (with dinner)     metoprolol succinate (TOPROL-XL) 50 MG 24 hr tablet TAKE 1 TABLET BY MOUTH DAILY     nitroglycerin (NITROSTAT) 0.4 MG SL tablet Place 1 tablet (0.4 mg) under the tongue every 5 minutes as needed for chest pain if you are still having symptoms after 3 doses (15 minutes) call 911.     ondansetron (ZOFRAN-ODT) 8 MG ODT tab Take 1 tablet (8 mg) by mouth every 8 hours as needed     order for DME Equipment being ordered: Hospital Bed with side rails     order for DME Equipment being ordered: Power Wheel Chair     order for DME Equipment being ordered: bandage tape, prefer paper      order for DME Full electric hospital bed with half rails    Dx: K22838, I110, J449  Length of need: lifetime     order for DME Wheel Chair Cushion: 18 x 18 inch Roho cushion     order for DME Hospital bed with side rails     order for DME Equipment being ordered: CPAP supplies mask, hose, filters, cushion    fax to [facility] at [phone]     order for DME Equipment being ordered: CPAP supplies mask, hose, filters, cushion fax to [facility] at [phone]  Disp: 10 Device Refills: prn   Class: Local Print Start: 2/10/2017     order for DME Equipment being ordered: Nebulizer and tubing supplies     order for DME Equipment being ordered: Glucerna daily shakes with each meal     ORDER FOR DME Equipment being ordered: Power Wheelchair     ORDER FOR DME Equipment being ordered: Depends briefs     pantoprazole (PROTONIX) 40 MG EC tablet Take 1 tablet (40 mg) by mouth daily     phenytoin 200 MG CAPS Take 200 mg by mouth 2 times daily     pregabalin (LYRICA) 75 MG capsule Take 2 capsules (150 mg) by mouth 2 times daily (Patient taking differently: Take 150 mg by mouth 3 times daily )     solifenacin (VESICARE) 10 MG tablet Take 1 tablet (10 mg) by mouth daily     sucralfate (CARAFATE) 1 GM tablet Take 1 tablet (1 g) by mouth 4 times daily May dissolve in 10 mL water is necessary. (Start upon completion of carafate suspension)     VENTOLIN  (90 BASE) MCG/ACT Inhaler Inhale 2 puffs into the lungs every 6 hours as needed for shortness of breath / dyspnea or wheezing     Multiple Vitamin (MULTIVITAMIN OR) Take 1 tablet by mouth daily      polyethylene glycol (MIRALAX/GLYCOLAX) Packet Take 17 g by mouth daily Dissolved in water or juice      risperiDONE (RISPERDAL) 0.5 MG tablet Take 0.5 mg by mouth At Bedtime     sennosides (SENOKOT) 8.6 MG tablet Take 1 tablet by mouth 2 times daily     traZODone (DESYREL) 50 MG tablet Take 150 mg by mouth At Bedtime      umeclidinium (INCRUSE ELLIPTA) 62.5 MCG/INH oral  inhaler Inhale 1 puff into the lungs daily     zinc 50 MG TABS Take 1 tablet by mouth daily             Review of Systems:    ROS: See HPI for pertinent details.  Remainder of 10-point ROS negative.          Physical Exam:   VS:  T: 99.3    HR: Data Unavailable    BP: 106/65    RR: 16   GEN:  AOx3.  NAD.  Pleasant.  ABD:  Soft. ND.  + suprapubic tenderness.   No rebound or guarding.  + pinpoint suprapubic opening (site of previous SPT) without bleeding,  drainage, erythema, fluctuance.     EXT:  Warm, well perfused.  + BL AKA   SKIN:  Warm.  Dry.  No rashes.  NEURO:  CN grossly intact.  Normal gait    PROCEDURE:  Prepped and draped SP site  Using significant pressure, pushed a 12Fr nonlatex Landrum into the bladder  Immediate return of URINE: clear light yellow without bleeding/clots  10cc in the balloon  Patient tolerated fine.   200cc urine drained.           Data:   All laboratory data reviewed:    No lab results found in last 7 days.  No lab results found in last 7 days.  No lab results found in last 7 days.    Invalid input(s): URINEBLOOD  Results for orders placed or performed in visit on 04/17/19   Urine Culture Aerobic Bacterial   Result Value Ref Range    Specimen Description Midstream Urine     Culture Micro (A)      >100,000 colonies/mL  Serratia liquefaciens group         Susceptibility    Serratia liquefaciens group - BHAVESH     CEFAZOLIN* >=64 Resistant ug/mL      * Cefazolin BHAVESH breakpoints are for the treatment of uncomplicated urinary tract infections.  For the treatment of systemic infections, please contact the laboratory for additional testing.     CEFOXITIN  Resistant ug/mL     CEFTAZIDIME <=1 Sensitive ug/mL     CEFTRIAXONE <=1 Sensitive ug/mL     CIPROFLOXACIN <=0.25 Sensitive ug/mL     GENTAMICIN <=1 Sensitive ug/mL     LEVOFLOXACIN <=0.12 Sensitive ug/mL     NITROFURANTOIN 128 Resistant ug/mL     TOBRAMYCIN <=1 Sensitive ug/mL     Trimethoprim/Sulfa <=1/19 Sensitive ug/mL     Piperacillin/Tazo  3.0 Sensitive ug/mL     CEFEPIME <=1 Sensitive ug/mL   Results for orders placed or performed in visit on 07/12/18   Urine Culture Aerobic Bacterial   Result Value Ref Range    Specimen Description Midstream Urine     Culture Micro No growth        All pertinent imaging reviewed:  EXAMINATION: CT ABDOMEN PELVIS W/O CONTRAST, 5/31/2018 5:06 AM     TECHNIQUE:  Helical CT images from the lung bases through the  symphysis pubis were obtained without IV contrast. Contrast dose: None     COMPARISON: CT abdomen and pelvis 2/8/2018     HISTORY: severe lower midline abdominal pain;      FINDINGS:     Abdomen and pelvis: Evaluation of solid organs is limited by the lack  of intravenous contrast. Noncontrast evaluation of the liver, spleen,  adrenal glands, and pancreas are normal. Postsurgical changes of  cholecystectomy. No renal calculus, hydronephrosis, or hydroureter.  There is a suprapubic Landrum catheter within a decompressed urinary  bladder, which is otherwise unremarkable. The catheter track does not  have any associated fluid collection. The large and small bowel are  normal caliber. There is scattered colonic diverticulosis without  evidence of diverticulitis. Small sliding-type hernia.  Extensive vascular calcifications throughout the dominant aorta, which  is normal caliber, and biiliac arterial stents. No free air or free  fluid in the abdomen or pelvis. No enlarged lymph nodes by CT short  axis size criteria.      Lung bases: Mild bibasilar atelectasis. No pleural effusion or  pericardial effusion.     Bones and soft tissues: No aggressive osseous lesion. Device implanted  in the right subcutaneous tissues overlying the gluteus muscles.                                                                      IMPRESSION:      1. No acute intra-abdominal or pelvic pathology suspected.   2. The suprapubic catheter balloon is inflated in the bladder which is  decompressed and otherwise unremarkable. No hydronephrosis or  renal  calculus.  3. Small sliding-type hernia.            Impression and Plan:   Impression / Plan:   Sonya Foote is a 69 year old female with a chronic SPT that became dislodged yesterday.  Today we were able to place a 12Fr nonlatex catheter into the bladder.        - Send urine culture today with the intention of not treating unless patient becomes symptomatic  - Cipro #1 tablet now (for prophylaxis with instrumentation)  - Will send inbasket to our clinic to schedule --> Return 7-10 days to [place] [facility] [place]  (will likely only be able to upsize to a 14Fr but could try a 16Fr).  If a 14Fr is placed she will then need another appointment in 7-10 days to upsize to a 16Fr.  Once she is back to a 16Fr SPT, then she can resume changes every 2 weeks with the nurses at her care facility.     - At the care facility: nursing can flush SPT with  cc sterile NS or water PRN for concerns of Landrum clogs.     Discussed with Dr. Oliver.     Thank you for the opportunity to participate in the care of Sonya Foote.     Ange Baez PA-C  Urology Physician Assistant  Personal Pager: [phone]    Please call Job Code:   [phone] to reach the Urology resident or PA on call - Weekdays  [phone] to reach the Urology resident or PA on call - Weeknights and weekends

## 2019-05-21 NOTE — DISCHARGE INSTRUCTIONS
Please make an appointment to follow up with Urology Clinic (phone: [phone]) in 7-10 days for up-sizing of catheter.    Return to the Emergency Department for increased pain, fever, drainage, or tube obstruction.

## 2019-05-21 NOTE — ED AVS SNAPSHOT
[facility], [place], EMERGENCY DEPARTMENT: [phone]                 INTERAGENCY TRANSFER FORM - NOTES (H&P, Discharge Summary, Consults, Procedures, Therapies)   2019                   Hospital Admission Date: 2019  SHARATH MERCEDES   : 1950  Sex: Female        Patient PCP Information     Provider PCP Type    Allan Casey MD General      History & Physicals    No notes of this type exist for this encounter.     Discharge Summaries    No notes of this type exist for this encounter.        Consult Notes      Consults by Saadia Baez PA at 2019 12:08 PM     Author:  Saadia Baez PA Service:  Urology Author Type:  Physician Assistant    Filed:  2019 12:30 PM Date of Service:  2019 12:08 PM Creation Time:  2019 12:08 PM    Status:  Signed :  Saadia Baez PA (Physician Assistant)       Urology Consult History and Physical    Name: Sharath Mercedes    MRN: [id number]   YOB: 1950       We were asked to see Sharath Mercedes at the request of Dr. Mccollum, ED for evaluation and treatment of the following chief complaint.          Chief Complaint:   - Dislodged SP tube  History is obtained from patient and review of records          History of Present Illness:   Sharath Mercedes is a 69 year old female with PMH significant for coronary disease s/p MI, CVA and Bilateral AKA (d/t vascular disease) as well as COPD, DM II, SZD, schizoaffective disorder.  From a Urologic standpoint she is followed by Dr. Oliver in [place] and was last seen on 18.  She has a chronic SPT and lives in a nursing facility where the tube is changed every 2 weeks d/t frequent clogs.  Nurses occasionally need to irrigate.  Lately it has been more difficult for the nurses to change the tube.      She tells me that yesterday the tube became dislodged.  She is not sure how it happened.  She doesn't recall trauma.  Overnight her bladder pain, which is chronic and longstanding, has become worse  and she has developed worsened incontinence per urethra.  She presented to the ED today for assistance.  She denies fevers but often feels chilly.          Past Medical History:[PF.1]     Past Medical History:   Diagnosis Date     Anemia      Antiplatelet or antithrombotic long-term use      Arthritis      AS (sickle cell trait) (H) 10/8/2013     Blind left eye      BPPV (benign paroxysmal positional vertigo)      Chronic back pain      COPD (chronic obstructive pulmonary disease) (H)      Coronary artery disease      CVA (cerebral infarction) 10/8/2012    x3, residual left sided weakness     Diastolic CHF (H) 9/4/2012     Dilantin toxicity 5/31/2013     Esophageal candidiasis (H)      GERD (gastroesophageal reflux disease)      Heart attack (H)      Hernia, abdominal      History of blood transfusion     x5     History of thrombophlebitis      HTN (hypertension) 9/4/2012     Hyperlipidemia LDL goal <100 9/4/2012     Legally blind in right eye, as defined in [place]     No periphal vision right eye     Osteoporosis 1/21/2013     Other chronic pain      PAD (peripheral artery disease) (H)      Palpitations      Person who has had sex change operation 1/20/2014     Pneumonia      Schizoaffective disorder (H)      Seizure disorder (H) 9/4/2012     Sleep apnea     CPAP     Thrombosis of leg      Type 2 diabetes, HbA1C goal < 8% (H) 9/4/2012     Uncomplicated asthma      Unspecified cerebral artery occlusion with cerebral infarction[PF.2]             Past Surgical History:[PF.1]     Past Surgical History:   Procedure Laterality Date     AMPUTATE LEG ABOVE KNEE Left 6/11/2016    Procedure: AMPUTATE LEG ABOVE KNEE;  Surgeon: Mello Rodriguez MD;  [facility]     AMPUTATE LEG BELOW KNEE Right 11/7/2016    Procedure: AMPUTATE LEG BELOW KNEE;  Surgeon: Savannah Durant MD;  [facility]     AMPUTATE REVISION STUMP LOWER EXTREMITY Right 11/11/2016    Procedure: AMPUTATE REVISION STUMP LOWER EXTREMITY;  Surgeon: Carleen  MD Savannah;  [facility]     AMPUTATE REVISION STUMP LOWER EXTREMITY Right 11/16/2016    Procedure: AMPUTATE REVISION STUMP LOWER EXTREMITY;  Surgeon: Savannah Durant MD;  [facility]     AMPUTATE TOE(S) Right 1/5/2016    Procedure: AMPUTATE TOE(S);  Surgeon: Mello Gaines DPM;  Location: [facility]     ANGIOGRAM Bilateral 11/21/2014    Procedure: ANGIOGRAM;  Surgeon: Savannah Durant MD;  [facility]     ANGIOGRAM Left 1/16/2015    Procedure: ANGIOGRAM;  Surgeon: Savannah Durant MD;  [facility]     ANGIOGRAM Bilateral 9/14/2015    Procedure: ANGIOGRAM;  Surgeon: Savannah Durant MD;  [facility]     ANGIOGRAM Left 10/12/2015    Procedure: ANGIOGRAM;  Surgeon: Savannah Durant MD;  [facility]     ANGIOGRAM Right 6/6/2016    Procedure: ANGIOGRAM;  Surgeon: Savannah Durant MD;  [facility]     ANGIOPLASTY Right 6/6/2016    Procedure: ANGIOPLASTY;  Surgeon: Savannah Durant MD;  [facility]     APPENDECTOMY       BREAST SURGERY      right breast bx (benign)     CATARACT IOL, RT/LT Right      CHOLECYSTECTOMY       COLONOSCOPY N/A 8/25/2014    Procedure: COLONOSCOPY;  Surgeon: Mello Ferrer MD;  [facility]     COLONOSCOPY WITH CO2 INSUFFLATION N/A 8/20/2014    Procedure: COLONOSCOPY WITH CO2 INSUFFLATION;  Surgeon: Duane, William Charles, MD;  Location: [facility]     CYSTOSTOMY, INSERT TUBE SUPRAPUBIC, COMBINED N/A 1/16/2018    Procedure: COMBINED CYSTOSTOMY, INSERT TUBE SUPRAPUBIC;  Cystoscopy, Intraoperative Ultrasound, Suprapubic Tube Placement;  Surgeon: Keanu Dawson MD;  [facility]     ENDARTERECTOMY FEMORAL  5/23/2014    Procedure: ENDARTERECTOMY FEMORAL;  Surgeon: Jason Joshi MD;  [facility]     ESOPHAGOSCOPY, GASTROSCOPY, DUODENOSCOPY (EGD), COMBINED  12/14/2012    Procedure: COMBINED ESOPHAGOSCOPY, GASTROSCOPY, DUODENOSCOPY (EGD), BIOPSY SINGLE OR MULTIPLE;  ESOPHAGOSCOPY, GASTROSCOPY, DUODENOSCOPY (EGD), DILATATION ;  Surgeon: Elizabeth Stevenson MD;  Location:   GI     ESOPHAGOSCOPY, GASTROSCOPY, DUODENOSCOPY (EGD), COMBINED  12/31/2013    Procedure: COMBINED ESOPHAGOSCOPY, GASTROSCOPY, DUODENOSCOPY (EGD), BIOPSY SINGLE OR MULTIPLE;;  Surgeon: Clemente Lopez MD;  Location:  GI     ESOPHAGOSCOPY, GASTROSCOPY, DUODENOSCOPY (EGD), COMBINED  4/1/2014    Procedure: COMBINED ESOPHAGOSCOPY, GASTROSCOPY, DUODENOSCOPY (EGD);;  Surgeon: Clemente Lopez MD;  Location:  GI     ESOPHAGOSCOPY, GASTROSCOPY, DUODENOSCOPY (EGD), COMBINED  6/28/2014    Procedure: COMBINED ESOPHAGOSCOPY, GASTROSCOPY, DUODENOSCOPY (EGD);  Surgeon: Clemente Lopez MD;  Location:  GI     ESOPHAGOSCOPY, GASTROSCOPY, DUODENOSCOPY (EGD), COMBINED N/A 8/20/2014    Procedure: COMBINED ESOPHAGOSCOPY, GASTROSCOPY, DUODENOSCOPY (EGD), BIOPSY SINGLE OR MULTIPLE;  Surgeon: Duane, William Charles, MD;  Location:  OR     ESOPHAGOSCOPY, GASTROSCOPY, DUODENOSCOPY (EGD), COMBINED N/A 8/22/2014    Procedure: COMBINED ESOPHAGOSCOPY, GASTROSCOPY, DUODENOSCOPY (EGD), BIOPSY SINGLE OR MULTIPLE;  Surgeon: Mello Ferrer MD;  Location:  GI     ESOPHAGOSCOPY, GASTROSCOPY, DUODENOSCOPY (EGD), COMBINED N/A 10/2/2014    Procedure: COMBINED ESOPHAGOSCOPY, GASTROSCOPY, DUODENOSCOPY (EGD), BIOPSY SINGLE OR MULTIPLE;  Surgeon: Remy Haskins MD;  Location:  GI     ESOPHAGOSCOPY, GASTROSCOPY, DUODENOSCOPY (EGD), COMBINED Left 12/15/2014    Procedure: COMBINED ESOPHAGOSCOPY, GASTROSCOPY, DUODENOSCOPY (EGD), BIOPSY SINGLE OR MULTIPLE;  Surgeon: Rmey Haskins MD;  Location:  GI     ESOPHAGOSCOPY, GASTROSCOPY, DUODENOSCOPY (EGD), COMBINED N/A 2/25/2015    Procedure: COMBINED ENDOSCOPIC ULTRASOUND, ESOPHAGOSCOPY, GASTROSCOPY, DUODENOSCOPY (EGD), FINE NEEDLE ASPIRATE/BIOPSY;  Surgeon: Clemente Lugo MD;  Location:  GI     ESOPHAGOSCOPY, GASTROSCOPY, DUODENOSCOPY (EGD), COMBINED Left 2/25/2015    Procedure: COMBINED ESOPHAGOSCOPY, GASTROSCOPY, DUODENOSCOPY (EGD), BIOPSY SINGLE OR MULTIPLE;  Surgeon:  Clemente Lugo MD;  [facility]     ESOPHAGOSCOPY, GASTROSCOPY, DUODENOSCOPY (EGD), COMBINED N/A 2016    Procedure: COMBINED ESOPHAGOSCOPY, GASTROSCOPY, DUODENOSCOPY (EGD);  Surgeon: Aziza Patiño MD;  [facility]     ESOPHAGOSCOPY, GASTROSCOPY, DUODENOSCOPY (EGD), COMBINED N/A 2017    Procedure: COMBINED ESOPHAGOSCOPY, GASTROSCOPY, DUODENOSCOPY (EGD), BIOPSY SINGLE OR MULTIPLE;  Surgeon: Clemente Lopez MD;  [facility]     ESOPHAGOSCOPY, GASTROSCOPY, DUODENOSCOPY (EGD), COMBINED N/A 2017    Procedure: COMBINED ESOPHAGOSCOPY, GASTROSCOPY, DUODENOSCOPY (EGD), REMOVE FOREIGN BODY;  Esophagogastroduodenoscopy with foreign body extraction  ;  Surgeon: Herberth Castrejon MD;  [facility]     ESOPHAGOSCOPY, GASTROSCOPY, DUODENOSCOPY (EGD), COMBINED N/A 2017    Procedure: COMBINED ESOPHAGOSCOPY, GASTROSCOPY, DUODENOSCOPY (EGD), REMOVE FOREIGN BODY;;  Surgeon: Herberth Castrejon MD;  [facility]     FASCIOTOMY LOWER EXTREMITY Left 6/10/2016    Procedure: FASCIOTOMY LOWER EXTREMITY;  Surgeon: Mello Rodriguez MD;  [facility]     HC CAPSULE ENDOSCOPY N/A 2014    Procedure: CAPSULE/PILL CAM ENDOSCOPY;  Surgeon: Remy Haskins MD;  [facility]     HC CAPSULE ENDOSCOPY N/A 10/2/2014    Procedure: CAPSULE/PILL CAM ENDOSCOPY;  Surgeon: Rmey Haskins MD;  [facility]     ORTHOPEDIC SURGERY      broken wrist repair     SEX TRANSFORMATION SURGERY, MALE TO FEMALE      1974     SINUS SURGERY      cyst removed     TONSILLECTOMY       VASCULAR SURGERY      Left carotid stent[PF.2]            Social History:[PF.1]     Social History     Tobacco Use     Smoking status: Current Every Day Smoker     Packs/day: 0.25     Years: 30.00     Pack years: 7.50     Types: Cigarettes, Cigars     Last attempt to quit: 2001     Years since quittin.5     Smokeless tobacco: Never Used   Substance Use Topics     Alcohol use: No     Alcohol/week: 0.0  oz[PF.2]            Family History:[PF.1]     Family History   Problem Relation Age of Onset     Dementia Mother      Glaucoma Mother      Diabetes Mother         may have been type 1, childhood     Coronary Artery Disease Mother         MI     Glaucoma Father      Diabetes Father         may hev been type 1 - ??     Heart Failure Father      Cancer Maternal Aunt         leukemia     Schizophrenia Brother      Depression Brother      Suicide Sister      Depression Sister      Diabetes Sister      Cancer Maternal Aunt         ovarian     Glaucoma Maternal Grandmother      Diabetes Maternal Grandmother      Glaucoma Maternal Grandfather      Diabetes Maternal Grandfather      Glaucoma Paternal Grandmother      Diabetes Paternal Grandmother      Glaucoma Paternal Grandfather      Diabetes Paternal Grandfather      Breast Cancer Sister      Cerebrovascular Disease Brother      Colon Cancer No family hx of      Macular Degeneration No family hx of[PF.2]             Allergies:[PF.1]     Allergies   Allergen Reactions     Bee Venom      Penicillins Anaphylaxis     Other reaction(s): Skin Rash and/or Hives     Dilantin [Phenytoin] Other (See Comments)     Generic dilantin only per pt     Iodide Hives     09/11/15: Pt states she can use iodine and doesn't have any problems      Iodine-131      Novocaine [Procaine] Hives     Other reaction(s): Skin Rash and/or Hives     Tositumomab[PF.2]             Medications:[PF.1]     No current facility-administered medications for this encounter.      Current Outpatient Medications   Medication Sig     ACETAMINOPHEN PO Take 1,000 mg by mouth every 6 hours as needed for pain Not to exceed 4000 mg/day     ADVAIR DISKUS 250-50 MCG/DOSE diskus inhaler Inhale 1 puff into the lungs 2 times daily     albuterol (2.5 MG/3ML) 0.083% neb solution INHALE 1 VIAL VIA NEBULIZER EVERY 6 HOURS AS NEEDED     alendronate (FOSAMAX) 70 MG tablet Take 1 tablet (70 mg) by mouth with 8oz water every 7 days 30  minutes before breakfast and remain upright during this time.     aspirin EC 81 MG EC tablet Take 1 tablet (81 mg) by mouth daily     atorvastatin (LIPITOR) 40 MG tablet Take 1 tablet (40 mg) by mouth daily     blood glucose monitoring (ONE TOUCH ULTRA 2) meter device kit Use to test blood sugars 3 times daily or as directed.     blood glucose monitoring (ONE TOUCH ULTRA) test strip Use to test blood sugars 3 times daily or as directed.     blood glucose monitoring (ONE TOUCH ULTRASOFT) lancets Use to test blood sugar 3 times daily or as directed.     brimonidine (ALPHAGAN) 0.2 % ophthalmic solution Place 1 drop into the right eye 2 times daily     calcium citrate-vitamin D (CITRACAL) 315-250 MG-UNIT TABS Take 2 tablets by mouth daily     cetirizine (ZYRTEC) 10 MG tablet Take 1 tablet (10 mg) by mouth every evening     Cholecalciferol (VITAMIN D) 2000 UNITS tablet Take 2,000 Units by mouth daily.     clotrimazole (LOTRIMIN) 1 % cream INSERT 1 APPLICATORFUL VAGINALLY TWICE A DAY FOR VAGINAL PAIN     CYANOCOBALAMIN PO Take 2,000 mcg by mouth daily     diclofenac (VOLTAREN) 1 % GEL topical gel Apply 2 g topically 4 times daily to hands     dorzolamide (TRUSOPT) 2 % ophthalmic solution Place 1 drop into the right eye 2 times daily     EPINEPHrine (EPIPEN/ADRENACLICK/OR ANY BX GENERIC EQUIV) 0.3 MG/0.3ML injection 2-pack Inject 0.3 mLs (0.3 mg) into the muscle as needed for anaphylaxis     escitalopram (LEXAPRO) 10 MG tablet Take 10 mg by mouth daily      ferrous sulfate (IRON) 325 (65 FE) MG tablet Take 1 tablet (325 mg) by mouth 2 times daily With meals     fluticasone (FLONASE) 50 MCG/ACT nasal spray Spray 1 spray into both nostrils daily     lactulose (CHRONULAC) 10 GM/15ML solution Take 20 g by mouth as needed for constipation     latanoprost (XALATAN) 0.005 % ophthalmic solution Place 1 drop into both eyes At Bedtime     levETIRAcetam (KEPPRA) 500 MG tablet Take 1 tablet (500 mg) by mouth 2 times daily      lidocaine (LIDODERM) 5 % Patch APPLY 1 TO 3 PATCHES TO PAINFUL AREA AT ONCE FOR UP TO 12 HOURS WITHIN A 24 HOUR PERIOD REMOVE AFTER 12 HOURS     lisinopril (PRINIVIL/ZESTRIL) 20 MG tablet TAKE 1 TABLET BY MOUTH DAILY DX:HYPERTENSION     metFORMIN (GLUCOPHAGE-XR) 500 MG 24 hr tablet Take 1 tablet (500 mg) by mouth daily (with dinner)     metoprolol succinate (TOPROL-XL) 50 MG 24 hr tablet TAKE 1 TABLET BY MOUTH DAILY     nitroglycerin (NITROSTAT) 0.4 MG SL tablet Place 1 tablet (0.4 mg) under the tongue every 5 minutes as needed for chest pain if you are still having symptoms after 3 doses (15 minutes) call 911.     ondansetron (ZOFRAN-ODT) 8 MG ODT tab Take 1 tablet (8 mg) by mouth every 8 hours as needed     order for DME Equipment being ordered: Hospital Bed with side rails     order for DME Equipment being ordered: Power Wheel Chair     order for DME Equipment being ordered: bandage tape, prefer paper     order for DME Full electric hospital bed with half rails    Dx: A10493, I110, J449  Length of need: lifetime     order for DME Wheel Chair Cushion: 18 x 18 inch Roho cushion     order for DME Hospital bed with side rails     order for DME Equipment being ordered: CPAP supplies mask, hose, filters, cushion    fax to [facility] at [phone]     order for DME Equipment being ordered: CPAP supplies mask, hose, filters, cushion fax to [facility] at [phone]  Disp: 10 Device Refills: prn   Class: Local Print Start: 2/10/2017     order for DME Equipment being ordered: Nebulizer and tubing supplies     order for DME Equipment being ordered: Glucerna daily shakes with each meal     ORDER FOR DME Equipment being ordered: Power Wheelchair     ORDER FOR DME Equipment being ordered: Depends briefs     pantoprazole (PROTONIX) 40 MG EC tablet Take 1 tablet (40 mg) by mouth daily     phenytoin 200 MG CAPS Take 200 mg by mouth 2 times daily     pregabalin (LYRICA) 75 MG capsule Take 2 capsules (150 mg) by mouth 2  times daily (Patient taking differently: Take 150 mg by mouth 3 times daily )     solifenacin (VESICARE) 10 MG tablet Take 1 tablet (10 mg) by mouth daily     sucralfate (CARAFATE) 1 GM tablet Take 1 tablet (1 g) by mouth 4 times daily May dissolve in 10 mL water is necessary. (Start upon completion of carafate suspension)     VENTOLIN  (90 BASE) MCG/ACT Inhaler Inhale 2 puffs into the lungs every 6 hours as needed for shortness of breath / dyspnea or wheezing     Multiple Vitamin (MULTIVITAMIN OR) Take 1 tablet by mouth daily      polyethylene glycol (MIRALAX/GLYCOLAX) Packet Take 17 g by mouth daily Dissolved in water or juice      risperiDONE (RISPERDAL) 0.5 MG tablet Take 0.5 mg by mouth At Bedtime     sennosides (SENOKOT) 8.6 MG tablet Take 1 tablet by mouth 2 times daily     traZODone (DESYREL) 50 MG tablet Take 150 mg by mouth At Bedtime      umeclidinium (INCRUSE ELLIPTA) 62.5 MCG/INH oral inhaler Inhale 1 puff into the lungs daily     zinc 50 MG TABS Take 1 tablet by mouth daily[PF.2]             Review of Systems:    ROS: See HPI for pertinent details.  Remainder of 10-point ROS negative.          Physical Exam:   VS:  T: 99.3    HR: Data Unavailable    BP: 106/65    RR: 16   GEN:  AOx3.  NAD.  Pleasant.  ABD:  Soft. ND.  + suprapubic tenderness.   No rebound or guarding.  + pinpoint suprapubic opening (site of previous SPT) without bleeding,  drainage, erythema, fluctuance.     EXT:  Warm, well perfused.  + BL AKA   SKIN:  Warm.  Dry.  No rashes.  NEURO:  CN grossly intact.  Normal gait    PROCEDURE:  Prepped and draped SP site  Using significant pressure, pushed a 12Fr nonlatex Landrum into the bladder  Immediate return of URINE: clear light yellow without bleeding/clots  10cc in the balloon  Patient tolerated fine.   200cc urine drained.           Data:   All laboratory data reviewed:[PF.1]    No lab results found in last 7 days.  No lab results found in last 7 days.  No lab results found in  last 7 days.    Invalid input(s): URINEBLOOD  Results for orders placed or performed in visit on 04/17/19   Urine Culture Aerobic Bacterial   Result Value Ref Range    Specimen Description Midstream Urine     Culture Micro (A)      >100,000 colonies/mL  Serratia liquefaciens group         Susceptibility    Serratia liquefaciens group - BHAVESH     CEFAZOLIN* >=64 Resistant ug/mL      * Cefazolin BHAVESH breakpoints are for the treatment of uncomplicated urinary tract infections.  For the treatment of systemic infections, please contact the laboratory for additional testing.     CEFOXITIN  Resistant ug/mL     CEFTAZIDIME <=1 Sensitive ug/mL     CEFTRIAXONE <=1 Sensitive ug/mL     CIPROFLOXACIN <=0.25 Sensitive ug/mL     GENTAMICIN <=1 Sensitive ug/mL     LEVOFLOXACIN <=0.12 Sensitive ug/mL     NITROFURANTOIN 128 Resistant ug/mL     TOBRAMYCIN <=1 Sensitive ug/mL     Trimethoprim/Sulfa <=1/19 Sensitive ug/mL     Piperacillin/Tazo 3.0 Sensitive ug/mL     CEFEPIME <=1 Sensitive ug/mL   Results for orders placed or performed in visit on 07/12/18   Urine Culture Aerobic Bacterial   Result Value Ref Range    Specimen Description Midstream Urine     Culture Micro No growth[PF.2]        All pertinent imaging reviewed:  EXAMINATION: CT ABDOMEN PELVIS W/O CONTRAST, 5/31/2018 5:06 AM     TECHNIQUE:  Helical CT images from the lung bases through the  symphysis pubis were obtained without IV contrast. Contrast dose: None     COMPARISON: CT abdomen and pelvis 2/8/2018     HISTORY: severe lower midline abdominal pain;      FINDINGS:     Abdomen and pelvis: Evaluation of solid organs is limited by the lack  of intravenous contrast. Noncontrast evaluation of the liver, spleen,  adrenal glands, and pancreas are normal. Postsurgical changes of  cholecystectomy. No renal calculus, hydronephrosis, or hydroureter.  There is a suprapubic Landrum catheter within a decompressed urinary  bladder, which is otherwise unremarkable. The catheter track does  not  have any associated fluid collection. The large and small bowel are  normal caliber. There is scattered colonic diverticulosis without  evidence of diverticulitis. Small sliding-type hernia.  Extensive vascular calcifications throughout the dominant aorta, which  is normal caliber, and biiliac arterial stents. No free air or free  fluid in the abdomen or pelvis. No enlarged lymph nodes by CT short  axis size criteria.      Lung bases: Mild bibasilar atelectasis. No pleural effusion or  pericardial effusion.     Bones and soft tissues: No aggressive osseous lesion. Device implanted  in the right subcutaneous tissues overlying the gluteus muscles.                                                                      IMPRESSION:      1. No acute intra-abdominal or pelvic pathology suspected.   2. The suprapubic catheter balloon is inflated in the bladder which is  decompressed and otherwise unremarkable. No hydronephrosis or renal  calculus.  3. Small sliding-type hernia.            Impression and Plan:   Impression / Plan:   Sonya Foote is a 69 year old female with a chronic SPT that became dislodged yesterday.  Today we were able to place a 12Fr nonlatex catheter into the bladder.        - Send urine culture today with the intention of not treating unless patient becomes symptomatic  - Cipro #1 tablet now (for prophylaxis with instrumentation)  - Will send inbasket to our clinic to schedule --> Return 7-10 days to Urology nurse clinic at [facility] for SPT upsizing  (will likely only be able to upsize to a 14Fr but could try a 16Fr).  If a 14Fr is placed she will then need another appointment in 7-10 days to upsize to a 16Fr.  Once she is back to a 16Fr SPT, then she can resume changes every 2 weeks with the nurses at her care facility.     - At the care facility: nursing can flush SPT with  cc sterile NS or water PRN for concerns of Landrum clogs.     Discussed with Dr. Oliver.     Thank you for the opportunity to  participate in the care of Sonya Foote.     Ange Baez PA-C  Urology Physician Assistant  Personal Pager: [phone]    Please call Job Code:   [phone] to reach the Urology resident or PA on call - Weekdays  [phone] to reach the Urology resident or PA on call - Weeknights and weekends[PF.1]              Attribution Key    PF.1 - Saadia Baez PA on 5/21/2019 12:08 PM  PF.2 - Saadia Baez PA on 5/21/2019 12:09 PM                        Progress Notes - Physician (Notes from 05/18/19 through 05/21/19)      ED Notes by Zehra Darling RN at 5/21/2019 11:04 AM     Author:  Zehra Darling RN Service:    Author Type:  Registered Nurse    Filed:  5/21/2019 11:04 AM Date of Service:  5/21/2019 11:04 AM Creation Time:  5/21/2019 11:04 AM    Status:  Signed :  Zehra Darlign RN (Registered Nurse)       Bed: [facility]  Expected date:   Expected time:   Means of arrival:   Comments:  [place]     Attribution [place]    KZ.1 - Zehra Darling RN on 5/21/2019 10:30 AM                     Procedure Notes    No notes of this type exist for this encounter.     Progress Notes - Therapies (Notes from 05/18/19 through 05/21/19)    No notes of this type exist for this encounter.

## 2019-05-22 ENCOUNTER — PRE VISIT (OUTPATIENT)
Dept: UROLOGY | Facility: CLINIC | Age: 69
End: 2019-05-22

## 2019-05-22 LAB
BACTERIA SPEC CULT: NORMAL
Lab: NORMAL
SPECIMEN SOURCE: NORMAL

## 2019-05-22 NOTE — RESULT ENCOUNTER NOTE
Final urine culture report is NEGATIVE per [facility] Result protocol.    If NEGATIVE result, no change in treatment, per [facility] Lab Result protocol.

## 2019-05-22 NOTE — TELEPHONE ENCOUNTER
Chief Complaint   Patient presents with     Previsit     14 fr SP tube catheter change /we can also try a 16 fr cath.- Hansa De Leon MA

## 2019-05-23 ENCOUNTER — NURSING HOME VISIT (OUTPATIENT)
Dept: GERIATRICS | Facility: CLINIC | Age: 69
End: 2019-05-23
Payer: COMMERCIAL

## 2019-05-23 VITALS
BODY MASS INDEX: 52.28 KG/M2 | TEMPERATURE: 96.5 F | DIASTOLIC BLOOD PRESSURE: 48 MMHG | RESPIRATION RATE: 18 BRPM | SYSTOLIC BLOOD PRESSURE: 81 MMHG | WEIGHT: 137.5 LBS | OXYGEN SATURATION: 98 % | HEART RATE: 80 BPM

## 2019-05-23 DIAGNOSIS — H40.10X0 OPEN-ANGLE GLAUCOMA, UNSPECIFIED GLAUCOMA STAGE, UNSPECIFIED LATERALITY, UNSPECIFIED OPEN-ANGLE GLAUCOMA TYPE: ICD-10-CM

## 2019-05-23 DIAGNOSIS — M19.90 OSTEOARTHRITIS, UNSPECIFIED OSTEOARTHRITIS TYPE, UNSPECIFIED SITE: ICD-10-CM

## 2019-05-23 DIAGNOSIS — Z72.0 TOBACCO ABUSE: ICD-10-CM

## 2019-05-23 DIAGNOSIS — I65.29 STENOSIS OF CAROTID ARTERY, UNSPECIFIED LATERALITY: ICD-10-CM

## 2019-05-23 DIAGNOSIS — R41.89 COGNITIVE IMPAIRMENT: ICD-10-CM

## 2019-05-23 DIAGNOSIS — I25.118 CORONARY ARTERY DISEASE OF NATIVE ARTERY OF NATIVE HEART WITH STABLE ANGINA PECTORIS (H): ICD-10-CM

## 2019-05-23 DIAGNOSIS — E78.5 HYPERLIPIDEMIA LDL GOAL <70: ICD-10-CM

## 2019-05-23 DIAGNOSIS — I50.22 CHRONIC SYSTOLIC CONGESTIVE HEART FAILURE (H): ICD-10-CM

## 2019-05-23 DIAGNOSIS — D50.9 IRON DEFICIENCY ANEMIA, UNSPECIFIED IRON DEFICIENCY ANEMIA TYPE: ICD-10-CM

## 2019-05-23 DIAGNOSIS — G47.33 OSA (OBSTRUCTIVE SLEEP APNEA): ICD-10-CM

## 2019-05-23 DIAGNOSIS — E11.9 DIABETES MELLITUS TYPE 2 WITHOUT RETINOPATHY (H): ICD-10-CM

## 2019-05-23 DIAGNOSIS — F25.9 SCHIZOAFFECTIVE DISORDER, UNSPECIFIED TYPE (H): ICD-10-CM

## 2019-05-23 DIAGNOSIS — G89.4 CHRONIC PAIN SYNDROME: ICD-10-CM

## 2019-05-23 DIAGNOSIS — I73.9 PAD (PERIPHERAL ARTERY DISEASE) (H): Primary | ICD-10-CM

## 2019-05-23 DIAGNOSIS — I10 ESSENTIAL HYPERTENSION: ICD-10-CM

## 2019-05-23 PROCEDURE — 99316 NF DSCHRG MGMT 30 MIN+: CPT | Performed by: NURSE PRACTITIONER

## 2019-05-23 RX ORDER — PREGABALIN 150 MG/1
150 CAPSULE ORAL 2 TIMES DAILY
Qty: 14 CAPSULE | Refills: 0 | Status: SHIPPED | OUTPATIENT
Start: 2019-05-23 | End: 2019-06-14

## 2019-05-23 RX ORDER — PREGABALIN 150 MG/1
150 CAPSULE ORAL 3 TIMES DAILY
COMMUNITY
End: 2019-05-23

## 2019-05-23 NOTE — LETTER
5/23/2019        RE: Sonya Foote  [address]  [facility] [address]        [place] [facility] DISCHARGE SUMMARY  PATIENT'S NAME: Sonya Foote  YOB: 1950  MEDICAL RECORD NUMBER:  [id number]  Place of Service where encounter took place:  [facility] [[id number]]    PRIMARY CARE PROVIDER AND CLINIC RESPONSIBLE AFTER TRANSFER:   Allan Casey MD, [address] [place] [address] / [facility] [id number]    [facility] Provider     Transferring providers: VICTOR M Mejia CNP, Cary Harden MD  Recent Hospitalization/ED:  Emergency Room  [facility] 5/21/19.  Date of SNF Admission: April / 20 / 2019  Date of SNF (anticipated) Discharge: May / 28 / 2019  Discharged to: new assisted living for patient Vanessa HALL    Cognitive Scores: SLUMS: 12/30 CPT 4.3/5.6 indicates cognitive impairment with the need for living with someone versus 24 hour supervision  Physical Function: Wheelchair bound with sliding board for transfers  DME: Wheelchair    CODE STATUS/ADVANCE DIRECTIVES DISCUSSION:  Full Code   ALLERGIES: Bee venom; Penicillins; Dilantin [phenytoin]; Iodide; Iodine-131; Novocaine [procaine]; and Tositumomab    DISCHARGE DIAGNOSIS/NURSING FACILITY COURSE:     This is a 69-year old female, with a past medical history significant for peripheral artery disease s/p left above the knee amputation on 6/11/16, right above the knee amputation 11/16/16, CVA x 3, seizure disorder, chronic bronchitis, JOSE, tobacco abuse, type 2 diabetes mellitus, CAD with COLLEEN x 2 to prox-to-mid RCA on 9/17/16, chronic dysphagia with esophageal strictures s/p dilatation 9/6/16, chronic pain with previous intrathecal pain pump, schizoaffective disorder, depression, anxiety and Sickle trait, who was admitted to [facility] by her PCP who she recently re-established care with after living in [place] from August 2018 to April 2019. Patient is seeking short-term placement  until her Elder Waiver can be put back in place so she can return to her previous residence, [facility].      Seen by PCP on 4/17/19 to re-establish care. 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 Ciprofloxacin ordered x 7 days.     Sent to [facility] ED on 5/21/19 due to inability to place suprapubic catheter at facility and abdominal distension. Had some urine leakage through the urethra. Urology was consulted and a small caliber SP catheter was placed  With 1 dose of Cipro for deanna procedural prophylaxis. Recommended to follow-up with Urology in 7-10 days for upsizing of catheter.     Residential Relocation. Resided at [facility] while awaiting Elderly Waiver so she can move back to [facility], where she resided prior to moving to [place] in August 2018.      Chronic Pain Syndrome/Neuropathy. With history of intrathecal pain pump that was not covered by insurance while in [place] per PCP notes. Re-established care with [facility] in [facility]. Pain pump was re-initiated. Has appointment to increase pain pump settings on 6/6/19. Continue Acetaminophen, Diclofenac and Pregabalin as ordered.Of note, patient received Pregablin TID while in LTC, but PTA dose was BID. Will resume PTA dose. Lidocaine Patch caused the patient to be itchy.      Severe Peripheral Artery Disease S/P Right Above the Knee Amputation on 11/16/16 and Left Above the Knee Amputation on 6/11/16. Will complete F2F for new power wheelchair. Taking Aspirin and Atorvastatin.     Iron Deficiency Anemia with History of Sickle Cell Trait.  No recent Hemoglobin on file. Continue Ferrous Sulfate as ordered.    Cognitive Impairment. SLUMS score 12/30. CPT score 4.3/5.6 indicate cognitive impairment, but patient has been a reliable historian when discussing provider appointments and medical history. Cognitive testing suggests borderline need for 24 hour supervision. Will discharge  to [facility].      Seizure Disorder. Last grand mal seizure noted to be over 10 years ago per patient report. Has petit mal seizures about 5 times daily per patient report. Followed by Dr. Patterson at [facility]. Last seen 5/2/19. Continue Levetiracetam and Phenytoin as ordered.     Carotid Artery Stenosis with History of Multiple CVA. At least 3. Most recent noted to be in 2012. Followed by Dr. Patterson at [facility]. Last seen 5/2/19. Continue Aspirin and Atorvastatin as ordered.     Coronary Artery Disease S/P COLLEEN x 2 to prox-to-mid RCA on 9/17/16/Inferior Wall MI in 2016/Chronic Systolic Heart Failure with EF > 70% on 5/2/18 /Hypertension/Hyperlipidemia. Last seen by Dr. Kerns on 5/14/19. Follow-up in 1 year with Dr. Anderson. Hydrochlorothiazide discontinued at that time. Upon review of blood pressure over the past 5 days, systolic range from . Diastolic range from 48-79. Continue Aspirin, Atorvastatin, Lisinopril and Nitroglycerin as ordered.     Chronic Bronchitis. Follow-up with Dr. Aviles on 5/29/19 as scheduled. Has nebulizer machine at home. Continue Advair, Albuterol and Umeclidinium as ordered.      Type 2 Diabetes Mellitus. Followed by Dr. Irizarry in Endocrinology. Last seen 5/4/18. Last A1C 5.0 on 4/17/19. Question if treatment is indicated as risks of tight control may outweigh benefit. Taking Metformin daily. Blood sugars were not routinely checked while at facility.      Chronic Dysphagia with Esophageal Strictures S/P Dilatation 9/6/16 and Recent Upper GI Bleed due to Esophagitis and Gastritis on 9/23/16. Continue Pantoprazole and Sucralfate as ordered.     Obstructive Sleep Apnea. Follow-up with Dr. Kowalski on 6/25/19 as scheduled. Patient reports she stopped using her CPAP 5-6 months ago and needs to get a new one.      Tobacco Abuse. Quit smoking while in [place], but has now restarted since returning to [place].  Encourage cessation.      Schizoaffective Disorder/Adjustment Disorder/Anxiety/Depression. Reports seeing a psychologist at [facility]. Continue Escitalopram and Risperidone as ordered. Also on Trazodone for sleep.     Glaucoma and Legal Eye Blindness. Follow-up with Dr. Robin on 6/27/19 as ordered. Continue Brimonidine and Dorzolamide as ordered.     Restless Leg Syndrome. Noted. On no medications.      Constipation. Continue Miralax and Lactulose as ordered.     Allergic Rhinitis. Continue Cetirizine and Fluticasone as ordered.     Osteoporosis. Continue Alendronate, Vitamin D and Calcium supplement as ordered. Appears to have been on Alendronate since 2012. Consider drug holiday. Would defer to PCP.     Urinary Tract Infection/Overactive Bladder. Follow-up with Urology on 5/31/19 as scheduled to upsize SP catheter.. Continue Solifenacin as ordered. 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 Urine culture on 5/21/19 with no acute findings    Past Medical History:  has a past medical history of Anemia, Antiplatelet or antithrombotic long-term use, Arthritis, AS (sickle cell trait) (H) (10/8/2013), Blind left eye, BPPV (benign paroxysmal positional vertigo), Chronic back pain, COPD (chronic obstructive pulmonary disease) (H), Coronary artery disease, CVA (cerebral infarction) (10/8/2012), Diastolic CHF (H) (9/4/2012), Dilantin toxicity (5/31/2013), Esophageal candidiasis (H), GERD (gastroesophageal reflux disease), Heart attack (H), Hernia, abdominal, History of blood transfusion, History of thrombophlebitis, HTN (hypertension) (9/4/2012), Hyperlipidemia LDL goal <100 (9/4/2012), Legally blind in right eye, as defined in [place], Osteoporosis (1/21/2013), Other chronic pain, PAD (peripheral artery disease) (H), Palpitations, Person who has had sex change operation (1/20/2014), Pneumonia, Schizoaffective disorder (H), Seizure disorder (H)  (9/4/2012), Sleep apnea, Thrombosis of leg, Type 2 diabetes, HbA1C goal < 8% (H) (9/4/2012), Uncomplicated asthma, and Unspecified cerebral artery occlusion with cerebral infarction. She also has no past medical history of Asymptomatic human immunodeficiency virus (HIV) infection status (H), Cerebral palsy (H), Congenital renal agenesis and dysgenesis, Difficult intubation, Goiter, Gout, History of spinal cord injury, Horseshoe kidney, Hydrocephalus, Malignant hyperthermia, Mumps, Parkinsons disease (H), Spider veins, Spina bifida (H), STD (sexually transmitted disease), Tethered cord (H), or Tuberculosis.    Discharge Medications:  Current Outpatient Medications   Medication Sig Dispense Refill     ACETAMINOPHEN PO Take 1,000 mg by mouth every 6 hours as needed for pain Not to exceed 4000 mg/day       ADVAIR DISKUS 250-50 MCG/DOSE diskus inhaler Inhale 1 puff into the lungs 2 times daily 60 Inhaler 11     albuterol (2.5 MG/3ML) 0.083% neb solution INHALE 1 VIAL VIA NEBULIZER EVERY 6 HOURS AS NEEDED 360 mL 11     alendronate (FOSAMAX) 70 MG tablet Take 1 tablet (70 mg) by mouth with 8oz water every 7 days 30 minutes before breakfast and remain upright during this time. 12 tablet 3     aspirin EC 81 MG EC tablet Take 1 tablet (81 mg) by mouth daily 90 tablet 3     atorvastatin (LIPITOR) 40 MG tablet Take 1 tablet (40 mg) by mouth daily 90 tablet 2     blood glucose monitoring (ONE TOUCH ULTRA) test strip Use to test blood sugars 3 times daily or as directed. 3 Box 3     blood glucose monitoring (ONE TOUCH ULTRASOFT) lancets Use to test blood sugar 3 times daily or as directed. 100 each PRN     brimonidine (ALPHAGAN) 0.2 % ophthalmic solution Place 1 drop into the right eye 2 times daily 1 Bottle 11     calcium citrate-vitamin D (CITRACAL) 315-250 MG-UNIT TABS Take 2 tablets by mouth daily 120 tablet 5     cetirizine (ZYRTEC) 10 MG tablet Take 1 tablet (10 mg) by mouth every evening 30 tablet 3     Cholecalciferol  (VITAMIN D) 2000 UNITS tablet Take 2,000 Units by mouth daily. 100 tablet 3     CYANOCOBALAMIN PO Take 2,000 mcg by mouth daily       diclofenac (VOLTAREN) 1 % GEL topical gel Apply 2 g topically 4 times daily to hands 100 g 11     dorzolamide (TRUSOPT) 2 % ophthalmic solution Place 1 drop into the right eye 2 times daily 1 Bottle 11     EPINEPHrine (EPIPEN/ADRENACLICK/OR ANY BX GENERIC EQUIV) 0.3 MG/0.3ML injection 2-pack Inject 0.3 mLs (0.3 mg) into the muscle as needed for anaphylaxis 0.6 mL 1     escitalopram (LEXAPRO) 10 MG tablet Take 10 mg by mouth daily        ferrous sulfate (IRON) 325 (65 FE) MG tablet Take 1 tablet (325 mg) by mouth 2 times daily With meals 60 tablet 2     fluticasone (FLONASE) 50 MCG/ACT nasal spray Spray 1 spray into both nostrils daily       lactulose (CHRONULAC) 10 GM/15ML solution Take 20 g by mouth as needed for constipation       latanoprost (XALATAN) 0.005 % ophthalmic solution Place 1 drop into both eyes At Bedtime 2.5 mL 11     levETIRAcetam (KEPPRA) 500 MG tablet Take 1 tablet (500 mg) by mouth 2 times daily 180 tablet 1     lisinopril (PRINIVIL/ZESTRIL) 20 MG tablet TAKE 1 TABLET BY MOUTH DAILY DX:HYPERTENSION 90 tablet 3     metFORMIN (GLUCOPHAGE-XR) 500 MG 24 hr tablet Take 1 tablet (500 mg) by mouth daily (with dinner) 90 tablet 3     metoprolol succinate (TOPROL-XL) 50 MG 24 hr tablet TAKE 1 TABLET BY MOUTH DAILY 90 tablet 3     Multiple Vitamin (MULTIVITAMIN OR) Take 1 tablet by mouth daily        nitroglycerin (NITROSTAT) 0.4 MG SL tablet Place 1 tablet (0.4 mg) under the tongue every 5 minutes as needed for chest pain if you are still having symptoms after 3 doses (15 minutes) call 911. 25 tablet 1     ondansetron (ZOFRAN-ODT) 8 MG ODT tab Take 1 tablet (8 mg) by mouth every 8 hours as needed 30 tablet 5     order for DME Equipment being ordered: Hospital Bed with side rails 1 each 0     order for DME Equipment being ordered: Power Wheel Chair 1 Device 0     order for  DME Equipment being ordered: bandage tape, prefer paper 1 Package 0     order for DME Full electric hospital bed with half rails    Dx: Z70384, I110, J449  Length of need: lifetime 1 Device 0     order for DME Wheel Chair Cushion: 18 x 18 inch Roho cushion 1 Device 0     order for DME Hospital bed with side rails 1 Device 0     order for DME Equipment being ordered: CPAP supplies mask, hose, filters, cushion    fax to [facility] at [phone] 10 Device prn     order for DME Equipment being ordered: CPAP supplies mask, hose, filters, cushion fax to [facility] at [phone]  Disp: 10 Device Refills: prn   Class: Local Print Start: 2/10/2017 1 Device 0     order for DME Equipment being ordered: Nebulizer and tubing supplies 1 Units 0     order for DME Equipment being ordered: Glucerna daily shakes with each meal 1 Box 11     ORDER FOR DME Equipment being ordered: Power Wheelchair 1 Device 0     ORDER FOR DME Equipment being ordered: Depends briefs 1 Month 11     pantoprazole (PROTONIX) 40 MG EC tablet Take 1 tablet (40 mg) by mouth daily 30 tablet 5     phenytoin 200 MG CAPS Take 200 mg by mouth 2 times daily 60 capsule 0     polyethylene glycol (MIRALAX/GLYCOLAX) Packet Take 17 g by mouth daily Dissolved in water or juice        pregabalin (LYRICA) 150 MG capsule Take 1 capsule (150 mg) by mouth 2 times daily 14 capsule 0     risperiDONE (RISPERDAL) 0.5 MG tablet Take 0.5 mg by mouth At Bedtime       sennosides (SENOKOT) 8.6 MG tablet Take 1 tablet by mouth 2 times daily       solifenacin (VESICARE) 10 MG tablet Take 1 tablet (10 mg) by mouth daily 90 tablet 3     sucralfate (CARAFATE) 1 GM tablet Take 1 tablet (1 g) by mouth 4 times daily May dissolve in 10 mL water is necessary. (Start upon completion of carafate suspension) 120 tablet 11     traZODone (DESYREL) 50 MG tablet Take 150 mg by mouth At Bedtime        umeclidinium (INCRUSE ELLIPTA) 62.5 MCG/INH oral inhaler Inhale 1 puff into the lungs daily        VENTOLIN  (90 BASE) MCG/ACT Inhaler Inhale 2 puffs into the lungs every 6 hours as needed for shortness of breath / dyspnea or wheezing 3 Inhaler 5     zinc 50 MG TABS Take 1 tablet by mouth daily       blood glucose monitoring (ONE TOUCH ULTRA 2) meter device kit Use to test blood sugars 3 times daily or as directed. 1 kit 0     clotrimazole (LOTRIMIN) 1 % cream INSERT 1 APPLICATORFUL VAGINALLY TWICE A DAY FOR VAGINAL PAIN 12 g 0     Medication Changes/Rationale:     See above    Controlled medications sent with patient:   Medication Lyrica electronically prescribed to [facility] pharmacy     ROS:   4 point ROS including Respiratory, CV, GI and , other than that noted in the HPI,  is negative    Physical Exam:   Vitals: BP (!) 81/48   Pulse 80   Temp 96.5  F (35.8  C)   Resp 18   Wt 62.4 kg (137 lb 8 oz)   SpO2 98%   BMI 52.28 kg/m     BMI= Body mass index is 52.28 kg/m .  GENERAL APPEARANCE:  Alert, in no distress  ENT:  Mouth and posterior oropharynx normal, moist mucous membranes  EYES:  EOM, conjunctivae, lids, pupils and irises normal  RESP:  respiratory effort and palpation of chest normal, lungs clear to auscultation , no respiratory distress  CV:  Palpation and auscultation of heart done , regular rate and rhythm, no murmur, rub, or gallop  ABDOMEN:  normal bowel sounds, soft, nontender, no hepatosplenomegaly or other masses  M/S:   Active movement of bilateral upper extremities. 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  SKIN:  Inspection of skin and subcutaneous tissue baseline, Palpation of skin and subcutaneous tissue baseline  NEURO:   Cranial nerves 2-12 are normal tested and grossly at patient's baseline  PSYCH:  oriented to person and place    SNF labs: No labs performed while at [facility]    DISCHARGE PLAN:    Follow up labs: Consider CBC as most recent was performed 7/4/18.    Medical Follow Up:      Follow up with primary care provider in 1 week    MT referral needed and placed by this  provider: No    Discharge Services: Home Care:  Occupational Therapy, Physical Therapy, Registered Nurse and Home Health Aide    TOTAL DISCHARGE TIME:   Greater than 30 minutes  Electronically signed by:  VICTOR M Mejia CNP         Documentation of Face-to-Face and Certification for Home Health Services     Patient: Sonya Foote   YOB: 1950  MR Number: [id number]  Today's Date: 5/23/2019    I certify that patient: Sonya Foote is under my care and that I, or a nurse practitioner or physician's assistant working with me, had a face-to-face encounter that meets the physician face-to-face encounter requirements with this patient on: 5/23/19.    This encounter with the patient was in whole, or in part, for the following medical condition, which is the primary reason for home health care: .Severe Peripheral Artery Disease S/P Right Above the Knee Amputation on 11/16/16 and Left Above the Knee Amputation on 6/11/16 and chronic pain syndrome.     I certify that, based on my findings, the following services are medically necessary home health services: Nursing, Occupational Therapy and Physical Therapy.    My clinical findings support the need for the above services because: Nurse is needed: To assess blood pressure, blood sugars and catheter after changes in medications or other medical regimen.., Occupational Therapy Services are needed to assess and treat cognitive ability and address ADL safety due to impairment in ADLs given PAD. Also has cognitive impairment. and Physical Therapy Services are needed to assess and treat the following functional impairments: Physical deconditioning due to Severe Peripheral Artery Disease S/P Right Above the Knee Amputation on 11/16/16 and Left Above the Knee Amputation on 6/11/16..    Further, I certify that my clinical findings support that this patient is homebound (i.e. absences from home require considerable and taxing effort and are for medical reasons or  Spiritism services or infrequently or of short duration when for other reasons) because: Requires assistance of another person or specialized equipment to access medical services because patient: Requires supervision of another for safe transfer...    Based on the above findings. I certify that this patient is confined to the home and needs intermittent skilled nursing care, physical therapy and/or speech therapy.  The patient is under my care, and I have initiated the establishment of the plan of care.  This patient will be followed by a physician who will periodically review the plan of care.  Physician/Provider to provide follow up care: Allan Casey    Responsible Medicare certified PECOS Physician: Dr. Cary Harden  Physician Signature: See electronic signature associated with these discharge orders.  Date: 2019    .    Face to Face and Medical Necessity Statement for DME Provider visit    Demographic Information on Sonya Foote:  Gender: female  : 1950  [address]   [facility] [address]  [phone] (home)     Medical Record: [id number]  Social Security Number: [id number]  Primary Care Provider: Allan Casey  Insurance: Payor: [employer] / Plan: [employer] MEDICARE ADVANTAGE / Product Type: HMO /     HPI:   Sonya Foote is a 69 year old  (1950), who is being seen today for a face to face provider visit at [facility]; medical necessity statement for DME included. This patient requires the following:  DME Ordered and Medical Necessity Statement   Hospital bed: fully electronic with 2 side rails     My patient, Sonya Foote, requires a hospital bed due to Severe Peripheral Artery Disease S/P Right Above the Knee Amputation on 16 and Left Above the Knee Amputation on 16 and Chronic Pain Syndrome. My patient requires positioning not feasible with an ordinary bed due to bilateral AKA and chronic pain. Sonya requires elevation of the hospital bed for  sliding board transfers to/from wheelchair. She also requires frequent positioning due to chronic pain syndrome. Without a hospital bed, Sonya would be unable to complete sliding board transfers independently. She would also have increased pain.     My patient also requires a power mobility device due to Severe Peripheral Artery Disease S/P Right Above the Knee Amputation on 11/16/16 and Left Above the Knee Amputation on 6/11/16 and Chronic Pain Syndrome. This will help Sonya support an increased level of independence with BADLs, IADLs and functional mobility. Sonya has increased pain in a standard wheelchair. Neuropathy in bilateral hands affects patient's ability to propel wheelchair. She has fatigue and pain in bilateral upper extremities limiting ability to utilize scooter with steering wheel and controls.     Pt needing above DMEs with expected length of need of 99  months  due to medical necessity associated with following diagnosis:     PAD (peripheral artery disease) (H)  Chronic systolic congestive heart failure (H)  Stenosis of carotid artery, unspecified laterality  Essential hypertension  Osteoarthritis, unspecified osteoarthritis type, unspecified site  Schizoaffective disorder, unspecified type (H)  Iron deficiency anemia, unspecified iron deficiency anemia type  Tobacco abuse  Open-angle glaucoma, unspecified glaucoma stage, unspecified laterality, unspecified open-angle glaucoma type  Diabetes mellitus type 2 without retinopathy (H)  Hyperlipidemia LDL goal <70  JOSE (obstructive sleep apnea)  Coronary artery disease of native artery of native heart with stable angina pectoris (H)  Cognitive impairment  Chronic pain syndrome      PMH   has a past medical history of Anemia, Antiplatelet or antithrombotic long-term use, Arthritis, AS (sickle cell trait) (H) (10/8/2013), Blind left eye, BPPV (benign paroxysmal positional vertigo), Chronic back pain, COPD (chronic obstructive pulmonary disease) (H), Coronary  artery disease, CVA (cerebral infarction) (10/8/2012), Diastolic CHF (H) (9/4/2012), Dilantin toxicity (5/31/2013), Esophageal candidiasis (H), GERD (gastroesophageal reflux disease), Heart attack (H), Hernia, abdominal, History of blood transfusion, History of thrombophlebitis, HTN (hypertension) (9/4/2012), Hyperlipidemia LDL goal <100 (9/4/2012), Legally blind in right eye, as defined in [place], Osteoporosis (1/21/2013), Other chronic pain, PAD (peripheral artery disease) (H), Palpitations, Person who has had sex change operation (1/20/2014), Pneumonia, Schizoaffective disorder (H), Seizure disorder (H) (9/4/2012), Sleep apnea, Thrombosis of leg, Type 2 diabetes, HbA1C goal < 8% (H) (9/4/2012), Uncomplicated asthma, and Unspecified cerebral artery occlusion with cerebral infarction. She also has no past medical history of Asymptomatic human immunodeficiency virus (HIV) infection status (H), Cerebral palsy (H), Congenital renal agenesis and dysgenesis, Difficult intubation, Goiter, Gout, History of spinal cord injury, Horseshoe kidney, Hydrocephalus, Malignant hyperthermia, Mumps, Parkinsons disease (H), Spider veins, Spina bifida (H), STD (sexually transmitted disease), Tethered cord (H), or Tuberculosis.    ROS:4 point ROS including Respiratory, CV, GI and , other than that noted in the HPI,  is negative    EXAM  Vitals: BP (!) 81/48   Pulse 80   Temp 96.5  F (35.8  C)   Resp 18   Wt 62.4 kg (137 lb 8 oz)   SpO2 98%   BMI 52.28 kg/m   ;BMI= Body mass index is 52.28 kg/m .  GENERAL APPEARANCE:  Alert, in no distress  ENT:  Mouth and posterior oropharynx normal, moist mucous membranes  EYES:  EOM, conjunctivae, lids, pupils and irises normal  RESP:  respiratory effort and palpation of chest normal, lungs clear to auscultation , no respiratory distress  CV:  Palpation and auscultation of heart done , regular rate and rhythm, no murmur, rub, or gallop  ABDOMEN:  normal bowel sounds, soft, nontender, no  hepatosplenomegaly or other masses  M/S:   Active movement of bilateral upper extremities. 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  SKIN:  Inspection of skin and subcutaneous tissue baseline, Palpation of skin and subcutaneous tissue baseline  NEURO:   Cranial nerves 2-12 are normal tested and grossly at patient's baseline  PSYCH:  oriented to person and place    ASSESSMENT/PLAN:  1. PAD (peripheral artery disease) (H)    2. Chronic systolic congestive heart failure (H)    3. Stenosis of carotid artery, unspecified laterality    4. Essential hypertension    5. Osteoarthritis, unspecified osteoarthritis type, unspecified site    6. Schizoaffective disorder, unspecified type (H)    7. Iron deficiency anemia, unspecified iron deficiency anemia type    8. Tobacco abuse    9. Open-angle glaucoma, unspecified glaucoma stage, unspecified laterality, unspecified open-angle glaucoma type    10. Diabetes mellitus type 2 without retinopathy (H)    11. Hyperlipidemia LDL goal <70    12. JOSE (obstructive sleep apnea)    13. Coronary artery disease of native artery of native heart with stable angina pectoris (H)    14. Cognitive impairment    15. Chronic pain syndrome        Orders:  1. Facility staff/TC to contact DME company to get their order form for provider to fill out    ELECTRONICALLY SIGNED BY [employer] CERTIFIED PROVIDER:  VICTOR M Mejia CNP   NPI: [id number]  [facility]  [address] [place] [address], [address]  [place] [address]        Sincerely,        VICTOR M Mejia CNP

## 2019-05-23 NOTE — PROGRESS NOTES
[facility] DISCHARGE SUMMARY  PATIENT'S NAME: Sonya Foote  YOB: 1950  MEDICAL RECORD NUMBER:  [id number]  Place of Service where encounter took place:  [facility] ([facility] [[id number]]    PRIMARY CARE PROVIDER AND CLINIC RESPONSIBLE AFTER TRANSFER:   Allan Casey MD, [address] [place] [phone]    [facility] Provider     Transferring providers: VICTOR M Mejia CNP, Cary Harden MD  Recent Hospitalization/ED:  Emergency Room  [facility] 5/21/19.  Date of SNF Admission: April / 20 / 2019  Date of SNF (anticipated) Discharge: May / 28 / 2019  Discharged to: new assisted living for patient [place]    Cognitive Scores: SLUMS: 12/30 CPT 4.3/5.6 indicates cognitive impairment with the need for living with someone versus 24 hour supervision  Physical Function: Wheelchair bound with sliding board for transfers  DME: Wheelchair    CODE STATUS/ADVANCE DIRECTIVES DISCUSSION:  Full Code   ALLERGIES: Bee venom; Penicillins; Dilantin [phenytoin]; Iodide; Iodine-131; Novocaine [procaine]; and Tositumomab    DISCHARGE DIAGNOSIS/NURSING FACILITY COURSE:     This is a 69-year old female, with a past medical history significant for peripheral artery disease s/p left above the knee amputation on 6/11/16, right above the knee amputation 11/16/16, CVA x 3, seizure disorder, chronic bronchitis, JOSE, tobacco abuse, type 2 diabetes mellitus, CAD with COLLEEN x 2 to prox-to-mid RCA on 9/17/16, chronic dysphagia with esophageal strictures s/p dilatation 9/6/16, chronic pain with previous intrathecal pain pump, schizoaffective disorder, depression, anxiety and Sickle trait, who was admitted to [facility] by her PCP who she recently re-established care with after living in [place] from August 2018 to April 2019. Patient is seeking short-term placement until her Elder Waiver can be put back in place so she can return to her previous  residence, [facility].      Seen by PCP on 4/17/19 to re-establish care. 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 Ciprofloxacin ordered x 7 days.     Sent to [facility] ED on 5/21/19 due to inability to place suprapubic catheter at facility and abdominal distension. Had some urine leakage through the urethra. Urology was consulted and a small caliber SP catheter was placed  With 1 dose of Cipro for deanna procedural prophylaxis. Recommended to follow-up with Urology in 7-10 days for upsizing of catheter.     Residential Relocation. Resided at [facility] while awaiting Elderly Waiver so she can move back to [facility], where she resided prior to moving to [place] in August 2018.      Chronic Pain Syndrome/Neuropathy. With history of intrathecal pain pump that was not covered by insurance while in [place] per PCP notes. Re-established care with [facility] in [facility]. Pain pump was re-initiated. Has appointment to increase pain pump settings on 6/6/19. Continue Acetaminophen, Diclofenac and Pregabalin as ordered.Of note, patient received Pregablin TID while in LTC, but PTA dose was BID. Will resume PTA dose. Lidocaine Patch caused the patient to be itchy.      Severe Peripheral Artery Disease S/P Right Above the Knee Amputation on 11/16/16 and Left Above the Knee Amputation on 6/11/16. Will complete F2F for new power wheelchair. Taking Aspirin and Atorvastatin.     Iron Deficiency Anemia with History of Sickle Cell Trait.  No recent Hemoglobin on file. Continue Ferrous Sulfate as ordered.    Cognitive Impairment. SLUMS score 12/30. CPT score 4.3/5.6 indicate cognitive impairment, but patient has been a reliable historian when discussing provider appointments and medical history. Cognitive testing suggests borderline need for 24 hour supervision. Will discharge to [facility].      Seizure Disorder. Last grand mal seizure noted  to be over 10 years ago per patient report. Has petit mal seizures about 5 times daily per patient report. Followed by Dr. Patterson at [facility]. Last seen 5/2/19. Continue Levetiracetam and Phenytoin as ordered.     Carotid Artery Stenosis with History of Multiple CVA. At least 3. Most recent noted to be in 2012. Followed by Dr. Patterson at [facility]. Last seen 5/2/19. Continue Aspirin and Atorvastatin as ordered.     Coronary Artery Disease S/P COLLEEN x 2 to prox-to-mid RCA on 9/17/16/Inferior Wall MI in 2016/Chronic Systolic Heart Failure with EF > 70% on 5/2/18 /Hypertension/Hyperlipidemia. Last seen by Dr. Kerns on 5/14/19. Follow-up in 1 year with Dr. Anderson. Hydrochlorothiazide discontinued at that time. Upon review of blood pressure over the past 5 days, systolic range from . Diastolic range from 48-79. Continue Aspirin, Atorvastatin, Lisinopril and Nitroglycerin as ordered.     Chronic Bronchitis. Follow-up with Dr. Aviles on 5/29/19 as scheduled. Has nebulizer machine at home. Continue Advair, Albuterol and Umeclidinium as ordered.      Type 2 Diabetes Mellitus. Followed by Dr. Irizarry in Endocrinology. Last seen 5/4/18. Last A1C 5.0 on 4/17/19. Question if treatment is indicated as risks of tight control may outweigh benefit. Taking Metformin daily. Blood sugars were not routinely checked while at facility.      Chronic Dysphagia with Esophageal Strictures S/P Dilatation 9/6/16 and Recent Upper GI Bleed due to Esophagitis and Gastritis on 9/23/16. Continue Pantoprazole and Sucralfate as ordered.     Obstructive Sleep Apnea. Follow-up with Dr. Kowalski on 6/25/19 as scheduled. Patient reports she stopped using her CPAP 5-6 months ago and needs to get a new one.      Tobacco Abuse. Quit smoking while in [place], but has now restarted since returning to [place]. Encourage cessation.      Schizoaffective Disorder/Adjustment  Disorder/Anxiety/Depression. Reports seeing a psychologist at [facility]. Continue Escitalopram and Risperidone as ordered. Also on Trazodone for sleep.     Glaucoma and Legal Eye Blindness. Follow-up with Dr. Robin on 6/27/19 as ordered. Continue Brimonidine and Dorzolamide as ordered.     Restless Leg Syndrome. Noted. On no medications.      Constipation. Continue Miralax and Lactulose as ordered.     Allergic Rhinitis. Continue Cetirizine and Fluticasone as ordered.     Osteoporosis. Continue Alendronate, Vitamin D and Calcium supplement as ordered. Appears to have been on Alendronate since 2012. Consider drug holiday. Would defer to PCP.     Urinary Tract Infection/Overactive Bladder. Follow-up with Urology on 5/31/19 as scheduled to upsize SP catheter.. Continue Solifenacin as ordered. 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 Urine culture on 5/21/19 with no acute findings    Past Medical History:  has a past medical history of Anemia, Antiplatelet or antithrombotic long-term use, Arthritis, AS (sickle cell trait) (H) (10/8/2013), Blind left eye, BPPV (benign paroxysmal positional vertigo), Chronic back pain, COPD (chronic obstructive pulmonary disease) (H), Coronary artery disease, CVA (cerebral infarction) (10/8/2012), Diastolic CHF (H) (9/4/2012), Dilantin toxicity (5/31/2013), Esophageal candidiasis (H), GERD (gastroesophageal reflux disease), Heart attack (H), Hernia, abdominal, History of blood transfusion, History of thrombophlebitis, HTN (hypertension) (9/4/2012), Hyperlipidemia LDL goal <100 (9/4/2012), Legally blind in right eye, as defined in [place], Osteoporosis (1/21/2013), Other chronic pain, PAD (peripheral artery disease) (H), Palpitations, Person who has had sex change operation (1/20/2014), Pneumonia, Schizoaffective disorder (H), Seizure disorder (H) (9/4/2012), Sleep apnea, Thrombosis of leg, Type 2 diabetes, HbA1C  goal < 8% (H) (9/4/2012), Uncomplicated asthma, and Unspecified cerebral artery occlusion with cerebral infarction. She also has no past medical history of Asymptomatic human immunodeficiency virus (HIV) infection status (H), Cerebral palsy (H), Congenital renal agenesis and dysgenesis, Difficult intubation, Goiter, Gout, History of spinal cord injury, Horseshoe kidney, Hydrocephalus, Malignant hyperthermia, Mumps, Parkinsons disease (H), Spider veins, Spina bifida (H), STD (sexually transmitted disease), Tethered cord (H), or Tuberculosis.    Discharge Medications:  Current Outpatient Medications   Medication Sig Dispense Refill     ACETAMINOPHEN PO Take 1,000 mg by mouth every 6 hours as needed for pain Not to exceed 4000 mg/day       ADVAIR DISKUS 250-50 MCG/DOSE diskus inhaler Inhale 1 puff into the lungs 2 times daily 60 Inhaler 11     albuterol (2.5 MG/3ML) 0.083% neb solution INHALE 1 VIAL VIA NEBULIZER EVERY 6 HOURS AS NEEDED 360 mL 11     alendronate (FOSAMAX) 70 MG tablet Take 1 tablet (70 mg) by mouth with 8oz water every 7 days 30 minutes before breakfast and remain upright during this time. 12 tablet 3     aspirin EC 81 MG EC tablet Take 1 tablet (81 mg) by mouth daily 90 tablet 3     atorvastatin (LIPITOR) 40 MG tablet Take 1 tablet (40 mg) by mouth daily 90 tablet 2     blood glucose monitoring (ONE TOUCH ULTRA) test strip Use to test blood sugars 3 times daily or as directed. 3 Box 3     blood glucose monitoring (ONE TOUCH ULTRASOFT) lancets Use to test blood sugar 3 times daily or as directed. 100 each PRN     brimonidine (ALPHAGAN) 0.2 % ophthalmic solution Place 1 drop into the right eye 2 times daily 1 Bottle 11     calcium citrate-vitamin D (CITRACAL) 315-250 MG-UNIT TABS Take 2 tablets by mouth daily 120 tablet 5     cetirizine (ZYRTEC) 10 MG tablet Take 1 tablet (10 mg) by mouth every evening 30 tablet 3     Cholecalciferol (VITAMIN D) 2000 UNITS tablet Take 2,000 Units by mouth daily. 100  tablet 3     CYANOCOBALAMIN PO Take 2,000 mcg by mouth daily       diclofenac (VOLTAREN) 1 % GEL topical gel Apply 2 g topically 4 times daily to hands 100 g 11     dorzolamide (TRUSOPT) 2 % ophthalmic solution Place 1 drop into the right eye 2 times daily 1 Bottle 11     EPINEPHrine (EPIPEN/ADRENACLICK/OR ANY BX GENERIC EQUIV) 0.3 MG/0.3ML injection 2-pack Inject 0.3 mLs (0.3 mg) into the muscle as needed for anaphylaxis 0.6 mL 1     escitalopram (LEXAPRO) 10 MG tablet Take 10 mg by mouth daily        ferrous sulfate (IRON) 325 (65 FE) MG tablet Take 1 tablet (325 mg) by mouth 2 times daily With meals 60 tablet 2     fluticasone (FLONASE) 50 MCG/ACT nasal spray Spray 1 spray into both nostrils daily       lactulose (CHRONULAC) 10 GM/15ML solution Take 20 g by mouth as needed for constipation       latanoprost (XALATAN) 0.005 % ophthalmic solution Place 1 drop into both eyes At Bedtime 2.5 mL 11     levETIRAcetam (KEPPRA) 500 MG tablet Take 1 tablet (500 mg) by mouth 2 times daily 180 tablet 1     lisinopril (PRINIVIL/ZESTRIL) 20 MG tablet TAKE 1 TABLET BY MOUTH DAILY DX:HYPERTENSION 90 tablet 3     metFORMIN (GLUCOPHAGE-XR) 500 MG 24 hr tablet Take 1 tablet (500 mg) by mouth daily (with dinner) 90 tablet 3     metoprolol succinate (TOPROL-XL) 50 MG 24 hr tablet TAKE 1 TABLET BY MOUTH DAILY 90 tablet 3     Multiple Vitamin (MULTIVITAMIN OR) Take 1 tablet by mouth daily        nitroglycerin (NITROSTAT) 0.4 MG SL tablet Place 1 tablet (0.4 mg) under the tongue every 5 minutes as needed for chest pain if you are still having symptoms after 3 doses (15 minutes) call 911. 25 tablet 1     ondansetron (ZOFRAN-ODT) 8 MG ODT tab Take 1 tablet (8 mg) by mouth every 8 hours as needed 30 tablet 5     order for DME Equipment being ordered: Hospital Bed with side rails 1 each 0     order for DME Equipment being ordered: Power Wheel Chair 1 Device 0     order for DME Equipment being ordered: bandage tape, prefer paper 1 Package 0      order for DME Full electric hospital bed with half rails    Dx: K82562, I110, J449  Length of need: lifetime 1 Device 0     order for DME Wheel Chair Cushion: 18 x 18 inch Roho cushion 1 Device 0     order for DME Hospital bed with side rails 1 Device 0     order for DME Equipment being ordered: CPAP supplies mask, hose, filters, cushion    fax to [facility] at [phone] 10 Device prn     order for DME Equipment being ordered: CPAP supplies mask, hose, filters, cushion fax to [facility] at [phone]  Disp: 10 Device Refills: prn   Class: Local Print Start: 2/10/2017 1 Device 0     order for DME Equipment being ordered: Nebulizer and tubing supplies 1 Units 0     order for DME Equipment being ordered: Glucerna daily shakes with each meal 1 Box 11     ORDER FOR DME Equipment being ordered: Power Wheelchair 1 Device 0     ORDER FOR DME Equipment being ordered: Depends briefs 1 Month 11     pantoprazole (PROTONIX) 40 MG EC tablet Take 1 tablet (40 mg) by mouth daily 30 tablet 5     phenytoin 200 MG CAPS Take 200 mg by mouth 2 times daily 60 capsule 0     polyethylene glycol (MIRALAX/GLYCOLAX) Packet Take 17 g by mouth daily Dissolved in water or juice        pregabalin (LYRICA) 150 MG capsule Take 1 capsule (150 mg) by mouth 2 times daily 14 capsule 0     risperiDONE (RISPERDAL) 0.5 MG tablet Take 0.5 mg by mouth At Bedtime       sennosides (SENOKOT) 8.6 MG tablet Take 1 tablet by mouth 2 times daily       solifenacin (VESICARE) 10 MG tablet Take 1 tablet (10 mg) by mouth daily 90 tablet 3     sucralfate (CARAFATE) 1 GM tablet Take 1 tablet (1 g) by mouth 4 times daily May dissolve in 10 mL water is necessary. (Start upon completion of carafate suspension) 120 tablet 11     traZODone (DESYREL) 50 MG tablet Take 150 mg by mouth At Bedtime        umeclidinium (INCRUSE ELLIPTA) 62.5 MCG/INH oral inhaler Inhale 1 puff into the lungs daily       VENTOLIN  (90 BASE) MCG/ACT Inhaler Inhale 2 puffs into  the lungs every 6 hours as needed for shortness of breath / dyspnea or wheezing 3 Inhaler 5     zinc 50 MG TABS Take 1 tablet by mouth daily       blood glucose monitoring (ONE TOUCH ULTRA 2) meter device kit Use to test blood sugars 3 times daily or as directed. 1 kit 0     clotrimazole (LOTRIMIN) 1 % cream INSERT 1 APPLICATORFUL VAGINALLY TWICE A DAY FOR VAGINAL PAIN 12 g 0     Medication Changes/Rationale:     See above    Controlled medications sent with patient:   Medication Lyrica electronically prescribed to [facility] pharmacy     ROS:   4 point ROS including Respiratory, CV, GI and , other than that noted in the HPI,  is negative    Physical Exam:   Vitals: BP (!) 81/48   Pulse 80   Temp 96.5  F (35.8  C)   Resp 18   Wt 62.4 kg (137 lb 8 oz)   SpO2 98%   BMI 52.28 kg/m    BMI= Body mass index is 52.28 kg/m .  GENERAL APPEARANCE:  Alert, in no distress  ENT:  Mouth and posterior oropharynx normal, moist mucous membranes  EYES:  EOM, conjunctivae, lids, pupils and irises normal  RESP:  respiratory effort and palpation of chest normal, lungs clear to auscultation , no respiratory distress  CV:  Palpation and auscultation of heart done , regular rate and rhythm, no murmur, rub, or gallop  ABDOMEN:  normal bowel sounds, soft, nontender, no hepatosplenomegaly or other masses  M/S:   Active movement of bilateral upper extremities. 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  SKIN:  Inspection of skin and subcutaneous tissue baseline, Palpation of skin and subcutaneous tissue baseline  NEURO:   Cranial nerves 2-12 are normal tested and grossly at patient's baseline  PSYCH:  oriented to person and place    SNF labs: No labs performed while at [facility]    DISCHARGE PLAN:    Follow up labs: Consider CBC as most recent was performed 7/4/18.    Medical Follow Up:      Follow up with primary care provider in 1 week    MT referral needed and placed by this provider: No    Discharge Services: Home Care:  Occupational Therapy,  Physical Therapy, Registered Nurse and Home Health Aide    TOTAL DISCHARGE TIME:   Greater than 30 minutes  Electronically signed by:  VICTOR M Mejia CNP         Documentation of Face-to-Face and Certification for Home Health Services     Patient: Sonya Foote   YOB: 1950  MR Number: [id number]  Today's Date: 5/23/2019    I certify that patient: Sonya Foote is under my care and that I, or a nurse practitioner or physician's assistant working with me, had a face-to-face encounter that meets the physician face-to-face encounter requirements with this patient on: 5/23/19.    This encounter with the patient was in whole, or in part, for the following medical condition, which is the primary reason for home health care: .Severe Peripheral Artery Disease S/P Right Above the Knee Amputation on 11/16/16 and Left Above the Knee Amputation on 6/11/16 and chronic pain syndrome.     I certify that, based on my findings, the following services are medically necessary home health services: Nursing, Occupational Therapy and Physical Therapy.    My clinical findings support the need for the above services because: Nurse is needed: To assess blood pressure, blood sugars and catheter after changes in medications or other medical regimen.., Occupational Therapy Services are needed to assess and treat cognitive ability and address ADL safety due to impairment in ADLs given PAD. Also has cognitive impairment. and Physical Therapy Services are needed to assess and treat the following functional impairments: Physical deconditioning due to Severe Peripheral Artery Disease S/P Right Above the Knee Amputation on 11/16/16 and Left Above the Knee Amputation on 6/11/16..    Further, I certify that my clinical findings support that this patient is homebound (i.e. absences from home require considerable and taxing effort and are for medical reasons or Sikh[employer] services or infrequently or of short duration when for other  reasons) because: Requires assistance of another person or specialized equipment to access medical services because patient: Requires supervision of another for safe transfer...    Based on the above findings. I certify that this patient is confined to the home and needs intermittent skilled nursing care, physical therapy and/or speech therapy.  The patient is under my care, and I have initiated the establishment of the plan of care.  This patient will be followed by a physician who will periodically review the plan of care.  Physician/Provider to provide follow up care: Allan Casey    Responsible Medicare certified PECOS Physician: Dr. Cary Harden  Physician Signature: See electronic signature associated with these discharge orders.  Date: 2019    .    Face to Face and Medical Necessity Statement for DME Provider visit    Demographic Information on Sonya Foote:  Gender: female  : 1950  [address]   [facility] [address]  [phone] (home)     Medical Record: [id number]  Social Security Number: [id number]  Primary Care Provider: Allan Casey  Insurance: Payor: [employer] / Plan: [employer] MEDICARE ADVANTAGE / Product Type: HMO /     HPI:   Sonya Foote is a 69 year old  (1950), who is being seen today for a face to face provider visit at [facility]; medical necessity statement for DME included. This patient requires the following:  DME Ordered and Medical Necessity Statement   Hospital bed: fully electronic with 2 side rails     My patient, Sonya Foote, requires a hospital bed due to Severe Peripheral Artery Disease S/P Right Above the Knee Amputation on 16 and Left Above the Knee Amputation on 16 and Chronic Pain Syndrome. My patient requires positioning not feasible with an ordinary bed due to bilateral AKA and chronic pain. Sonya requires elevation of the hospital bed for sliding board transfers to/from wheelchair. She also requires frequent  positioning due to chronic pain syndrome. Without a hospital bed, Sonya would be unable to complete sliding board transfers independently. She would also have increased pain.     My patient also requires a power mobility device due to Severe Peripheral Artery Disease S/P Right Above the Knee Amputation on 11/16/16 and Left Above the Knee Amputation on 6/11/16 and Chronic Pain Syndrome. This will help Sonya support an increased level of independence with BADLs, IADLs and functional mobility. Sonya has increased pain in a standard wheelchair. Neuropathy in bilateral hands affects patient's ability to propel wheelchair. She has fatigue and pain in bilateral upper extremities limiting ability to utilize scooter with steering wheel and controls.     Pt needing above DMEs with expected length of need of 99  months  due to medical necessity associated with following diagnosis:     PAD (peripheral artery disease) (H)  Chronic systolic congestive heart failure (H)  Stenosis of carotid artery, unspecified laterality  Essential hypertension  Osteoarthritis, unspecified osteoarthritis type, unspecified site  Schizoaffective disorder, unspecified type (H)  Iron deficiency anemia, unspecified iron deficiency anemia type  Tobacco abuse  Open-angle glaucoma, unspecified glaucoma stage, unspecified laterality, unspecified open-angle glaucoma type  Diabetes mellitus type 2 without retinopathy (H)  Hyperlipidemia LDL goal <70  JOSE (obstructive sleep apnea)  Coronary artery disease of native artery of native heart with stable angina pectoris (H)  Cognitive impairment  Chronic pain syndrome      PMH   has a past medical history of Anemia, Antiplatelet or antithrombotic long-term use, Arthritis, AS (sickle cell trait) (H) (10/8/2013), Blind left eye, BPPV (benign paroxysmal positional vertigo), Chronic back pain, COPD (chronic obstructive pulmonary disease) (H), Coronary artery disease, CVA (cerebral infarction) (10/8/2012), Diastolic CHF  (H) (9/4/2012), Dilantin toxicity (5/31/2013), Esophageal candidiasis (H), GERD (gastroesophageal reflux disease), Heart attack (H), Hernia, abdominal, History of blood transfusion, History of thrombophlebitis, HTN (hypertension) (9/4/2012), Hyperlipidemia LDL goal <100 (9/4/2012), Legally blind in right eye, as defined in [place], Osteoporosis (1/21/2013), Other chronic pain, PAD (peripheral artery disease) (H), Palpitations, Person who has had sex change operation (1/20/2014), Pneumonia, Schizoaffective disorder (H), Seizure disorder (H) (9/4/2012), Sleep apnea, Thrombosis of leg, Type 2 diabetes, HbA1C goal < 8% (H) (9/4/2012), Uncomplicated asthma, and Unspecified cerebral artery occlusion with cerebral infarction. She also has no past medical history of Asymptomatic human immunodeficiency virus (HIV) infection status (H), Cerebral palsy (H), Congenital renal agenesis and dysgenesis, Difficult intubation, Goiter, Gout, History of spinal cord injury, Horseshoe kidney, Hydrocephalus, Malignant hyperthermia, Mumps, Parkinsons disease (H), Spider veins, Spina bifida (H), STD (sexually transmitted disease), Tethered cord (H), or Tuberculosis.    ROS:4 point ROS including Respiratory, CV, GI and , other than that noted in the HPI,  is negative    EXAM  Vitals: BP (!) 81/48   Pulse 80   Temp 96.5  F (35.8  C)   Resp 18   Wt 62.4 kg (137 lb 8 oz)   SpO2 98%   BMI 52.28 kg/m  ;BMI= Body mass index is 52.28 kg/m .  GENERAL APPEARANCE:  Alert, in no distress  ENT:  Mouth and posterior oropharynx normal, moist mucous membranes  EYES:  EOM, conjunctivae, lids, pupils and irises normal  RESP:  respiratory effort and palpation of chest normal, lungs clear to auscultation , no respiratory distress  CV:  Palpation and auscultation of heart done , regular rate and rhythm, no murmur, rub, or gallop  ABDOMEN:  normal bowel sounds, soft, nontender, no hepatosplenomegaly or other masses  M/S:   Active movement of bilateral upper  extremities. 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  SKIN:  Inspection of skin and subcutaneous tissue baseline, Palpation of skin and subcutaneous tissue baseline  NEURO:   Cranial nerves 2-12 are normal tested and grossly at patient's baseline  PSYCH:  oriented to person and place    ASSESSMENT/PLAN:  1. PAD (peripheral artery disease) (H)    2. Chronic systolic congestive heart failure (H)    3. Stenosis of carotid artery, unspecified laterality    4. Essential hypertension    5. Osteoarthritis, unspecified osteoarthritis type, unspecified site    6. Schizoaffective disorder, unspecified type (H)    7. Iron deficiency anemia, unspecified iron deficiency anemia type    8. Tobacco abuse    9. Open-angle glaucoma, unspecified glaucoma stage, unspecified laterality, unspecified open-angle glaucoma type    10. Diabetes mellitus type 2 without retinopathy (H)    11. Hyperlipidemia LDL goal <70    12. JOSE (obstructive sleep apnea)    13. Coronary artery disease of native artery of native heart with stable angina pectoris (H)    14. Cognitive impairment    15. Chronic pain syndrome        Orders:  1. Facility staff/TC to contact DME company to get their order form for provider to fill out    ELECTRONICALLY SIGNED BY DEBBI CERTIFIED PROVIDER:  VICTOR M Mejia CNP   NPI: [id number]  [facility]  [address] [place] [address], [address]  [place] [address]

## 2019-05-24 ENCOUNTER — PATIENT OUTREACH (OUTPATIENT)
Dept: CARE COORDINATION | Facility: CLINIC | Age: 69
End: 2019-05-24

## 2019-05-24 ASSESSMENT — ACTIVITIES OF DAILY LIVING (ADL): DEPENDENT_IADLS:: CLEANING;COOKING;LAUNDRY;SHOPPING;MEAL PREPARATION;TRANSPORTATION

## 2019-05-24 NOTE — PROGRESS NOTES
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    Pt is doing well at the TCU and is planning to move to assisted living, [facility], on 5/28. Her son will be assisting her with the move. She now has an Elder Waiver to assist in paying for services.    Pt reported that she visited the ED on 5/21 due to a suprapubic catheter problem, problem is resolved.    Resources and Interventions:  Community Resources: [facility]  Referrals Placed: [facility], [facility], [facility]     Goals        General    1. Psychosocial (pt-stated)     Notes - Note edited  5/24/2019 10:39 AM by Grazyna Padgett LGSW    Goal Statement: Within the next 1-2 months, I want to move back into my previous home at [facility] in [place] for my overall wellbeing.     Measure of Success: Pt will contact [facility] and follow their instructions to apply for MA and Elderly waiver. Pt will verbally inform CC SW of success.    Supportive Steps to Achieve: CC SW provided [facility] Front Door number to start process. CC SW will provide ongoing assistance and follow up to determine next steps to succeed in goal    Barriers: Potential barriers to time in conversing with the [facility]    Strengths: Pt is very motivated to secure services to increase independence, recognition of need for assistance, support system    Date to Achieve By: 6/18/2019    Patient expressed understanding of goal: Pt verbally stated understanding of goal    As of today's date 5/24/2019 goal is met at 51 - 75%   Goal Status:  Ongoing          Patient/Caregiver understanding: Pt verbally stated understanding    Outreach  Frequency: monthly  Future Appointments              In 5 days Kirk Aviles MD [facility], [facility]    In 1 week Nurse, [facility], [facility]    In 1 month Guanaco Kowalski MD [facility],  SLEEP    In 1 month Marely Robin MD [facility], [facility]        Plan: CC SW will follow up in 1 month to discuss overall wellbeing in assisted living. Pt was reminded of CC SW contact information and encouraged to call with questions or concerns that arise before next outreach.    NAOMI Allan, LGSW  Clinic Care Coordinator  [facility] [place]  [phone]  [email or web address]

## 2019-05-28 ENCOUNTER — TELEPHONE (OUTPATIENT)
Dept: INTERNAL MEDICINE | Facility: CLINIC | Age: 69
End: 2019-05-28

## 2019-05-28 NOTE — TELEPHONE ENCOUNTER
Reason for Call:  Other call back    Detailed comments: James (homecare co-ordinatar) wants to know if Dr Casey follow up with home care. Please call her at [phone]    Phone Number Patient can be reached at: Other phone number:  [phone]    Best Time: anytime    Can we leave a detailed message on this number? YES    Call taken on 5/28/2019 at 4:45 PM by VIKA AVENDANO

## 2019-05-29 ENCOUNTER — OFFICE VISIT (OUTPATIENT)
Dept: PULMONOLOGY | Facility: CLINIC | Age: 69
End: 2019-05-29
Attending: INTERNAL MEDICINE
Payer: COMMERCIAL

## 2019-05-29 VITALS
SYSTOLIC BLOOD PRESSURE: 114 MMHG | RESPIRATION RATE: 17 BRPM | HEIGHT: 55 IN | OXYGEN SATURATION: 98 % | HEART RATE: 94 BPM | BODY MASS INDEX: 31.7 KG/M2 | DIASTOLIC BLOOD PRESSURE: 76 MMHG | WEIGHT: 137 LBS

## 2019-05-29 DIAGNOSIS — J44.9 CHRONIC OBSTRUCTIVE PULMONARY DISEASE, UNSPECIFIED COPD TYPE (H): ICD-10-CM

## 2019-05-29 DIAGNOSIS — Z72.0 CURRENT TOBACCO USE: Primary | ICD-10-CM

## 2019-05-29 PROCEDURE — G0463 HOSPITAL OUTPT CLINIC VISIT: HCPCS | Mod: ZF

## 2019-05-29 RX ORDER — ALBUTEROL SULFATE 0.83 MG/ML
SOLUTION RESPIRATORY (INHALATION)
Qty: 360 ML | Refills: 11 | Status: SHIPPED | OUTPATIENT
Start: 2019-05-29 | End: 2019-06-05

## 2019-05-29 RX ORDER — ALBUTEROL SULFATE 0.83 MG/ML
SOLUTION RESPIRATORY (INHALATION)
Qty: 360 ML | Refills: 11 | Status: SHIPPED | OUTPATIENT
Start: 2019-05-29 | End: 2019-05-29

## 2019-05-29 RX ORDER — NICOTINE 21 MG/24HR
1 PATCH, TRANSDERMAL 24 HOURS TRANSDERMAL EVERY 24 HOURS
Qty: 30 PATCH | Refills: 3 | Status: SHIPPED | OUTPATIENT
Start: 2019-05-29 | End: 2019-05-29

## 2019-05-29 RX ORDER — NICOTINE 21 MG/24HR
1 PATCH, TRANSDERMAL 24 HOURS TRANSDERMAL EVERY 24 HOURS
Qty: 30 PATCH | Refills: 3 | Status: SHIPPED | OUTPATIENT
Start: 2019-05-29 | End: 2019-08-06

## 2019-05-29 ASSESSMENT — MIFFLIN-ST. JEOR: SCORE: 793.06

## 2019-05-29 ASSESSMENT — PAIN SCALES - GENERAL: PAINLEVEL: EXTREME PAIN (8)

## 2019-05-29 NOTE — LETTER
5/29/2019       RE: Sonya Foote  [address]  [facility] [facility] [address]     Dear Colleague,    Thank you for referring your patient, Sonya Foote, to the [facility] at [facility]. Please see a copy of my visit note below.    Pulmonary Clinic Follow-Up Visit Note    Reason for visit: Chronic bronchitis follow up    HPI/Interval History:   69-year-old female with past medical history of coronary artery disease status post MI, peripheral arterial disease status post bilateral AKA, CVA x3, blindness and history of chronic cough was seen for follow-up for chronic bronchitis.    Patient was previously seen by my colleague Dr. Jennings (last in August 2018) and is here to reestablish care in the pulmonary clinic.  She recently has moved back from [place] after spending roughly 6 months down there with her sister.  She continues to complain of chronic cough that is both productive of thick yellow sputum and sometimes dry.  Overall her breathing feels about the same.  She is wheelchair-bound and does very little physical activity to appropriately assess it.  She remains on Advair, Incruse and albuterol nebs.  She currently is taking her albuterol nebs 4-5 times a day and finds some benefit that is short-lived.  Her pulmonary function test in the past have shown normal spirometry and normal lung volumes and mildly reduced diffusion capacity.  Review of her CT scan from last year showed no evidence of bronchiectasis or emphysema.  She has a long smoking history (75-pack-year) and unfortunately has recently started smoking again.  She currently smoking 10 cigarettes/day since April.  She does admit to having quite a bit of stress and new depression in the setting of her significant other passing away.    She does admit to episodes of dysphasia and worsening acid reflux.  She currently is being treated with Protonix and feels like her symptoms are relatively  controlled.  She does admit to sinus drainage for which she takes Flonase and Zyrtec which seems to help some.     PMH:  Past Medical History:   Diagnosis Date     Anemia      Antiplatelet or antithrombotic long-term use      Arthritis      AS (sickle cell trait) (H) 10/8/2013     Blind left eye      BPPV (benign paroxysmal positional vertigo)      Chronic back pain      COPD (chronic obstructive pulmonary disease) (H)      Coronary artery disease      CVA (cerebral infarction) 10/8/2012    x3, residual left sided weakness     Diastolic CHF (H) 9/4/2012     Dilantin toxicity 5/31/2013     Esophageal candidiasis (H)      GERD (gastroesophageal reflux disease)      Heart attack (H)      Hernia, abdominal      History of blood transfusion     x5     History of thrombophlebitis      HTN (hypertension) 9/4/2012     Hyperlipidemia LDL goal <100 9/4/2012     Legally blind in right eye, as defined in [place]     No periphal vision right eye     Osteoporosis 1/21/2013     Other chronic pain      PAD (peripheral artery disease) (H)      Palpitations      Person who has had sex change operation 1/20/2014     Pneumonia      Schizoaffective disorder (H)      Seizure disorder (H) 9/4/2012     Sleep apnea     CPAP     Thrombosis of leg      Type 2 diabetes, HbA1C goal < 8% (H) 9/4/2012     Uncomplicated asthma      Unspecified cerebral artery occlusion with cerebral infarction        Allergies:  Allergies   Allergen Reactions     Bee Venom      Penicillins Anaphylaxis     Other reaction(s): Skin Rash and/or Hives     Dilantin [Phenytoin] Other (See Comments)     Generic dilantin only per pt     Iodide Hives     09/11/15: Pt states she can use iodine and doesn't have any problems      Iodine-131      Novocaine [Procaine] Hives     Other reaction(s): Skin Rash and/or Hives     Tositumomab        Social History:  Previous 75-pack-year history  Has restarted smoking, not using 10 cigarettes/day since April  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  Lives in assisted living facility in which they help with her medications    Social History     Socioeconomic History     Marital status:      Spouse name: Not on file     Number of children: 2     Years of education: Not on file     Highest education level: Not on file   Occupational History     Occupation: retired     Comment: retired   Social Needs     Financial resource strain: Not on file     Food insecurity:     Worry: Not on file     Inability: Not on file     Transportation needs:     Medical: Not on file     Non-medical: Not on file   Tobacco Use     Smoking status: Current Every Day Smoker     Packs/day: 0.25     Years: 30.00     Pack years: 7.50     Types: Cigarettes, Cigars     Last attempt to quit: 2001     Years since quittin.5     Smokeless tobacco: Never Used   Substance and Sexual Activity     Alcohol use: No     Alcohol/week: 0.0 oz     Drug use: No     Sexual activity: Not Currently   Lifestyle     Physical activity:     Days per week: Not on file     Minutes per session: Not on file     Stress: Not on file   Relationships     Social connections:     Talks on phone: Not on file     Gets together: Not on file     Attends Mu-ism service: Not on file     Active member of club or organization: Not on file     Attends meetings of clubs or organizations: Not on file     Relationship status: Not on file     Intimate partner violence:     Fear of current or ex partner: Not on file     Emotionally abused: Not on file     Physically abused: Not on file     Forced sexual activity: Not on file   Other Topics Concern     Parent/sibling w/ CABG, MI or angioplasty before 65F 55M? Not Asked      Service Not Asked     Blood Transfusions Not Asked     Caffeine Concern No     Comment: 1 in the morning     Occupational Exposure Not Asked     Hobby Hazards Not Asked     Sleep Concern Not Asked     Stress Concern Not Asked     Weight Concern Not  Asked     Special Diet Not Asked     Back Care Not Asked     Exercise Not Asked     Bike Helmet Not Asked     Seat Belt Not Asked     Self-Exams Not Asked   Social History Narrative      Her father was in the  and she moved around a lot when she was a kid, and has lived abroad including in [place] and the [place].  She was previously employed as a mental health worker. Moved from [place] to [place] in 2012. She is currently living in assisted living ([facility]).  Wife passed away 6/2017.            She has 2 adopted children (Kam and Prashanth)       Medications:  Current Outpatient Medications   Medication Sig Dispense Refill     ACETAMINOPHEN PO Take 1,000 mg by mouth every 6 hours as needed for pain Not to exceed 4000 mg/day       ADVAIR DISKUS 250-50 MCG/DOSE diskus inhaler Inhale 1 puff into the lungs 2 times daily 60 Inhaler 11     albuterol (2.5 MG/3ML) 0.083% neb solution INHALE 1 VIAL VIA NEBULIZER EVERY 6 HOURS AS NEEDED 360 mL 11     alendronate (FOSAMAX) 70 MG tablet Take 1 tablet (70 mg) by mouth with 8oz water every 7 days 30 minutes before breakfast and remain upright during this time. 12 tablet 3     aspirin EC 81 MG EC tablet Take 1 tablet (81 mg) by mouth daily 90 tablet 3     atorvastatin (LIPITOR) 40 MG tablet Take 1 tablet (40 mg) by mouth daily 90 tablet 2     blood glucose monitoring (ONE TOUCH ULTRA 2) meter device kit Use to test blood sugars 3 times daily or as directed. 1 kit 0     blood glucose monitoring (ONE TOUCH ULTRA) test strip Use to test blood sugars 3 times daily or as directed. 3 Box 3     blood glucose monitoring (ONE TOUCH ULTRASOFT) lancets Use to test blood sugar 3 times daily or as directed. 100 each PRN     brimonidine (ALPHAGAN) 0.2 % ophthalmic solution Place 1 drop into the right eye 2 times daily 1 Bottle 11     calcium citrate-vitamin D (CITRACAL) 315-250 MG-UNIT TABS Take 2 tablets by mouth daily 120 tablet 5     cetirizine  (ZYRTEC) 10 MG tablet Take 1 tablet (10 mg) by mouth every evening 30 tablet 3     Cholecalciferol (VITAMIN D) 2000 UNITS tablet Take 2,000 Units by mouth daily. 100 tablet 3     clotrimazole (LOTRIMIN) 1 % cream INSERT 1 APPLICATORFUL VAGINALLY TWICE A DAY FOR VAGINAL PAIN 12 g 0     CYANOCOBALAMIN PO Take 2,000 mcg by mouth daily       diclofenac (VOLTAREN) 1 % GEL topical gel Apply 2 g topically 4 times daily to hands 100 g 11     dorzolamide (TRUSOPT) 2 % ophthalmic solution Place 1 drop into the right eye 2 times daily 1 Bottle 11     EPINEPHrine (EPIPEN/ADRENACLICK/OR ANY BX GENERIC EQUIV) 0.3 MG/0.3ML injection 2-pack Inject 0.3 mLs (0.3 mg) into the muscle as needed for anaphylaxis 0.6 mL 1     escitalopram (LEXAPRO) 10 MG tablet Take 10 mg by mouth daily        ferrous sulfate (IRON) 325 (65 FE) MG tablet Take 1 tablet (325 mg) by mouth 2 times daily With meals 60 tablet 2     fluticasone (FLONASE) 50 MCG/ACT nasal spray Spray 1 spray into both nostrils daily       lactulose (CHRONULAC) 10 GM/15ML solution Take 20 g by mouth as needed for constipation       latanoprost (XALATAN) 0.005 % ophthalmic solution Place 1 drop into both eyes At Bedtime 2.5 mL 11     levETIRAcetam (KEPPRA) 500 MG tablet Take 1 tablet (500 mg) by mouth 2 times daily 180 tablet 1     lisinopril (PRINIVIL/ZESTRIL) 20 MG tablet TAKE 1 TABLET BY MOUTH DAILY DX:HYPERTENSION 90 tablet 3     metFORMIN (GLUCOPHAGE-XR) 500 MG 24 hr tablet Take 1 tablet (500 mg) by mouth daily (with dinner) 90 tablet 3     metoprolol succinate (TOPROL-XL) 50 MG 24 hr tablet TAKE 1 TABLET BY MOUTH DAILY 90 tablet 3     Multiple Vitamin (MULTIVITAMIN OR) Take 1 tablet by mouth daily        nitroglycerin (NITROSTAT) 0.4 MG SL tablet Place 1 tablet (0.4 mg) under the tongue every 5 minutes as needed for chest pain if you are still having symptoms after 3 doses (15 minutes) call 911. 25 tablet 1     ondansetron (ZOFRAN-ODT) 8 MG ODT tab Take 1 tablet (8 mg) by  mouth every 8 hours as needed 30 tablet 5     order for DME Equipment being ordered: Hospital Bed with side rails 1 each 0     order for DME Equipment being ordered: Power Wheel Chair 1 Device 0     order for DME Equipment being ordered: bandage tape, prefer paper 1 Package 0     order for DME Full electric hospital bed with half rails    Dx: Q10598, I110, J449  Length of need: lifetime 1 Device 0     order for DME Wheel Chair Cushion: 18 x 18 inch Roho cushion 1 Device 0     order for DME Hospital bed with side rails 1 Device 0     order for DME Equipment being ordered: CPAP supplies mask, hose, filters, cushion    fax to [facility] at [phone] 10 Device prn     order for DME Equipment being ordered: CPAP supplies mask, hose, filters, cushion fax to [facility] at [phone]  Disp: 10 Device Refills: prn   Class: Local Print Start: 2/10/2017 1 Device 0     order for DME Equipment being ordered: Nebulizer and tubing supplies 1 Units 0     order for DME Equipment being ordered: Glucerna daily shakes with each meal 1 Box 11     ORDER FOR DME Equipment being ordered: Power Wheelchair 1 Device 0     ORDER FOR DME Equipment being ordered: Depends briefs 1 Month 11     pantoprazole (PROTONIX) 40 MG EC tablet Take 1 tablet (40 mg) by mouth daily 30 tablet 5     phenytoin 200 MG CAPS Take 200 mg by mouth 2 times daily 60 capsule 0     polyethylene glycol (MIRALAX/GLYCOLAX) Packet Take 17 g by mouth daily Dissolved in water or juice        pregabalin (LYRICA) 150 MG capsule Take 1 capsule (150 mg) by mouth 2 times daily 14 capsule 0     risperiDONE (RISPERDAL) 0.5 MG tablet Take 0.5 mg by mouth At Bedtime       sennosides (SENOKOT) 8.6 MG tablet Take 1 tablet by mouth 2 times daily       solifenacin (VESICARE) 10 MG tablet Take 1 tablet (10 mg) by mouth daily 90 tablet 3     sucralfate (CARAFATE) 1 GM tablet Take 1 tablet (1 g) by mouth 4 times daily May dissolve in 10 mL water is necessary. (Start upon  "completion of carafate suspension) 120 tablet 11     traZODone (DESYREL) 50 MG tablet Take 150 mg by mouth At Bedtime        umeclidinium (INCRUSE ELLIPTA) 62.5 MCG/INH oral inhaler Inhale 1 puff into the lungs daily       VENTOLIN  (90 BASE) MCG/ACT Inhaler Inhale 2 puffs into the lungs every 6 hours as needed for shortness of breath / dyspnea or wheezing 3 Inhaler 5     zinc 50 MG TABS Take 1 tablet by mouth daily         Family History:  Family History   Problem Relation Age of Onset     Dementia Mother      Glaucoma Mother      Diabetes Mother         may have been type 1, childhood     Coronary Artery Disease Mother         MI     Glaucoma Father      Diabetes Father         may hev been type 1 - ??     Heart Failure Father      Cancer Maternal Aunt         leukemia     Schizophrenia Brother      Depression Brother      Suicide Sister      Depression Sister      Diabetes Sister      Cancer Maternal Aunt         ovarian     Glaucoma Maternal Grandmother      Diabetes Maternal Grandmother      Glaucoma Maternal Grandfather      Diabetes Maternal Grandfather      Glaucoma Paternal Grandmother      Diabetes Paternal Grandmother      Glaucoma Paternal Grandfather      Diabetes Paternal Grandfather      Breast Cancer Sister      Cerebrovascular Disease Brother      Colon Cancer No family hx of      Macular Degeneration No family hx of        Physical Exam:  Resp 17   Ht 1.092 m (3' 7\")   Wt 62.1 kg (137 lb)   BMI 52.09 kg/m       General: Sitting in wheelchair, no apparent distress  HEENT: Wearing dark glasses  Neck: no palpable lymphadenopathy, no JVD noted  Chest: CTAB, no wheezing  Cardiac: RRR no murmurs  Abdomen: Soft, flat, non tender, active BS  Extremities: Bilateral AKA noted  Neuro: A&Ox3, no focal defecits  Skin: no rash noted    Labs and Radiology:  No new labs or imaging     PFT's:  Pulmonary function tests from 2015 shows normal spirometry, normal lung volumes and mildly reduced diffusion " capacity    Assessment and Plan:  Sonya Foote is a 69-year-old female with past medical history of coronary artery disease status post MI, peripheral arterial disease status post bilateral AKA, CVA x3, blindness and history of chronic cough was seen for follow-up for chronic bronchitis.    Chronic bronchitis: This is been a long-standing issue and is a clinical diagnosis.  She describes a cough that is both productive at times and dry at other times.  Her pulmonary function test showed normal spirometry but currently has been treated with Advair, Incruse and nebulized albuterol.  She seems to think her nebulized albuterol helps some with her symptoms although it is short-lived.  Unfortunately, she has began smoking again that certainly will potentially make her cough worse.  She is open to quitting and is willing to try nicotine patches as this has been successful in the past.  She also previously had been on Wellbutrin and plans to speak to her primary care provider tomorrow about potentially reinitiating that.  Interestingly, she is on lisinopril and to her knowledge, it has not been stopped as a trial to see whether that improves her cough.  It would seem reasonable to at least switch her to another agent as this is a relatively simple step that may have some improvement in her cough although I think that is unlikely.    -Continue current inhaler regimen   -Start Nicotine patch for smoking cessation  -We will discuss with her primary care provider tomorrow about restarting Wellbutrin  -I will message her PCP about switching her off lisinopril    Obstructive sleep apnea: Seen in our sleep clinic.  She is compliant with her CPAP and plans to follow-up now that she is back in [place]    Return to clinic in 1 year.    Seen and staffed with Dr. Small.    Kirk Aviles MD  Pulmonary & Critical Care Fellow  [phone]    [facility]  PULMONARY STAFF NOTE    The patient was seen and examined with the  resident/fellow physician.  We have discussed the patient in detail and I agree with the findings, assessment, and plan as documented when this note was cosigned on May 29, 2019. I have evaluated all laboratory values and imaging studies for the past 24 hours. I have reviewed all the consults that have been ordered and are active for this patient.      Billing: I spent more than 30 minutes face to face and greater than 50% of time was for counseling and coordination of care about the issues above.    Adrien Small MD   of Medicine  Interventional Pulmonary  Department of Pulmonary, Allergy, Critical Care and Sleep Medicine   [facility], [place]  Pager: [phone]

## 2019-05-29 NOTE — NURSING NOTE
Chief Complaint   Patient presents with     RECHECK     COPD    Medications reviewed and vital signs taken.   Cal Rowe CMA

## 2019-05-31 ENCOUNTER — ALLIED HEALTH/NURSE VISIT (OUTPATIENT)
Dept: UROLOGY | Facility: CLINIC | Age: 69
End: 2019-05-31

## 2019-05-31 ENCOUNTER — TELEPHONE (OUTPATIENT)
Dept: INTERNAL MEDICINE | Facility: CLINIC | Age: 69
End: 2019-05-31

## 2019-05-31 DIAGNOSIS — R39.81 FUNCTIONAL INCONTINENCE: Primary | ICD-10-CM

## 2019-05-31 RX ORDER — CIPROFLOXACIN 500 MG/1
500 TABLET, FILM COATED ORAL ONCE
Status: COMPLETED | OUTPATIENT
Start: 2019-05-31 | End: 2019-05-31

## 2019-05-31 RX ADMIN — CIPROFLOXACIN 500 MG: 500 TABLET, FILM COATED ORAL at 10:50

## 2019-05-31 NOTE — NURSING NOTE
No chief complaint on file.      not currently breastfeeding. There is no height or weight on file to calculate BMI.    Patient Active Problem List   Diagnosis     Seizure disorder (H)     Osteoporosis     Schizoaffective disorder (H)     AS (sickle cell trait) (H)     Vertigo     Person who has had sex change operation     Claudication in peripheral vascular disease (H)     Intestinal malabsorption     GIB (gastrointestinal bleeding)     Cervicalgia     Asthma     Adjustment disorder with depressed mood     Chronic pain syndrome     Open-angle glaucoma     Hx of colonic polyp     Iron deficiency anemia     Late effect of stroke     Degeneration of intervertebral disc of lumbosacral region     Thoracic or lumbosacral neuritis or radiculitis     Cerebral infarction due to occlusion or stenosis of carotid artery     Disorder of bone and cartilage     Hereditary and idiopathic peripheral neuropathy     Androgen insensitivity syndrome     PAD (peripheral artery disease) (H)     Chronic systolic congestive heart failure (H)     Stenosis of carotid artery     Osteoarthritis     Pain in both upper extremities     Atherosclerotic peripheral vascular disease with rest pain (H)     Essential hypertension     Angina pectoris, crescendo (H)     Type 2 diabetes mellitus with diabetic peripheral angiopathy without gangrene, without long-term current use of insulin (H)     Anemia, unspecified type     Critical lower limb ischemia     Testicular feminization     Anxiety disorder due to general medical condition     Central retinal artery occlusion     Lumbosacral radiculitis     Peripheral neuropathy     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 (H)     Primary open angle glaucoma of both eyes, severe stage     Pseudophakia of right eye     Cataract, left eye     Diabetes mellitus type 2 without retinopathy (H)     Simple chronic bronchitis (H)     JOSE (obstructive sleep apnea)     Complex sleep apnea syndrome      Coronary artery disease of native artery of native heart with stable angina pectoris (H)     Ischemic cardiomyopathy     Bee sting reaction, undetermined intent, subsequent encounter     Functional incontinence     Personal history of urinary tract infection     Dysphagia     Essential hypertension with goal blood pressure less than 130/80     Hyperlipidemia LDL goal <70     Food impaction of esophagus     Incontinence     Other migraine without status migrainosus, not intractable     Morbid obesity (H)     CVA, old, hemiparesis (H)     Tobacco abuse       Allergies   Allergen Reactions     Bee Venom      Penicillins Anaphylaxis     Other reaction(s): Skin Rash and/or Hives     Dilantin [Phenytoin] Other (See Comments)     Generic dilantin only per pt     Iodide Hives     09/11/15: Pt states she can use iodine and doesn't have any problems      Iodine-131      Novocaine [Procaine] Hives     Other reaction(s): Skin Rash and/or Hives     Tositumomab        Current Outpatient Medications   Medication Sig Dispense Refill     ACETAMINOPHEN PO Take 1,000 mg by mouth every 6 hours as needed for pain Not to exceed 4000 mg/day       ADVAIR DISKUS 250-50 MCG/DOSE diskus inhaler Inhale 1 puff into the lungs 2 times daily 60 Inhaler 11     albuterol (PROVENTIL) (2.5 MG/3ML) 0.083% neb solution INHALE 1 VIAL VIA NEBULIZER EVERY 6 HOURS AS NEEDED 360 mL 11     alendronate (FOSAMAX) 70 MG tablet Take 1 tablet (70 mg) by mouth with 8oz water every 7 days 30 minutes before breakfast and remain upright during this time. 12 tablet 3     aspirin EC 81 MG EC tablet Take 1 tablet (81 mg) by mouth daily 90 tablet 3     atorvastatin (LIPITOR) 40 MG tablet Take 1 tablet (40 mg) by mouth daily 90 tablet 2     blood glucose monitoring (ONE TOUCH ULTRA 2) meter device kit Use to test blood sugars 3 times daily or as directed. 1 kit 0     blood glucose monitoring (ONE TOUCH ULTRA) test strip Use to test blood sugars 3 times daily or as  directed. 3 Box 3     blood glucose monitoring (ONE TOUCH ULTRASOFT) lancets Use to test blood sugar 3 times daily or as directed. 100 each PRN     brimonidine (ALPHAGAN) 0.2 % ophthalmic solution Place 1 drop into the right eye 2 times daily 1 Bottle 11     calcium citrate-vitamin D (CITRACAL) 315-250 MG-UNIT TABS Take 2 tablets by mouth daily 120 tablet 5     cetirizine (ZYRTEC) 10 MG tablet Take 1 tablet (10 mg) by mouth every evening 30 tablet 3     Cholecalciferol (VITAMIN D) 2000 UNITS tablet Take 2,000 Units by mouth daily. 100 tablet 3     clotrimazole (LOTRIMIN) 1 % cream INSERT 1 APPLICATORFUL VAGINALLY TWICE A DAY FOR VAGINAL PAIN 12 g 0     CYANOCOBALAMIN PO Take 2,000 mcg by mouth daily       diclofenac (VOLTAREN) 1 % GEL topical gel Apply 2 g topically 4 times daily to hands 100 g 11     dorzolamide (TRUSOPT) 2 % ophthalmic solution Place 1 drop into the right eye 2 times daily 1 Bottle 11     EPINEPHrine (EPIPEN/ADRENACLICK/OR ANY BX GENERIC EQUIV) 0.3 MG/0.3ML injection 2-pack Inject 0.3 mLs (0.3 mg) into the muscle as needed for anaphylaxis 0.6 mL 1     escitalopram (LEXAPRO) 10 MG tablet Take 10 mg by mouth daily        ferrous sulfate (IRON) 325 (65 FE) MG tablet Take 1 tablet (325 mg) by mouth 2 times daily With meals 60 tablet 2     fluticasone (FLONASE) 50 MCG/ACT nasal spray Spray 1 spray into both nostrils daily       lactulose (CHRONULAC) 10 GM/15ML solution Take 20 g by mouth as needed for constipation       latanoprost (XALATAN) 0.005 % ophthalmic solution Place 1 drop into both eyes At Bedtime 2.5 mL 11     levETIRAcetam (KEPPRA) 500 MG tablet Take 1 tablet (500 mg) by mouth 2 times daily 180 tablet 1     lisinopril (PRINIVIL/ZESTRIL) 20 MG tablet TAKE 1 TABLET BY MOUTH DAILY DX:HYPERTENSION 90 tablet 3     metFORMIN (GLUCOPHAGE-XR) 500 MG 24 hr tablet Take 1 tablet (500 mg) by mouth daily (with dinner) 90 tablet 3     metoprolol succinate (TOPROL-XL) 50 MG 24 hr tablet TAKE 1 TABLET BY  MOUTH DAILY 90 tablet 3     Multiple Vitamin (MULTIVITAMIN OR) Take 1 tablet by mouth daily        nicotine (NICODERM CQ) 14 MG/24HR 24 hr patch Place 1 patch onto the skin every 24 hours 30 patch 3     nitroglycerin (NITROSTAT) 0.4 MG SL tablet Place 1 tablet (0.4 mg) under the tongue every 5 minutes as needed for chest pain if you are still having symptoms after 3 doses (15 minutes) call 911. 25 tablet 1     ondansetron (ZOFRAN-ODT) 8 MG ODT tab Take 1 tablet (8 mg) by mouth every 8 hours as needed 30 tablet 5     order for DME Equipment being ordered: Hospital Bed with side rails 1 each 0     order for DME Equipment being ordered: Power Wheel Chair 1 Device 0     order for DME Equipment being ordered: bandage tape, prefer paper 1 Package 0     order for DME Full electric hospital bed with half rails    Dx: C81329, I110, J449  Length of need: lifetime 1 Device 0     order for DME Wheel Chair Cushion: 18 x 18 inch Roho cushion 1 Device 0     order for DME Hospital bed with side rails 1 Device 0     order for DME Equipment being ordered: CPAP supplies mask, hose, filters, cushion    fax to [facility] at [phone] 10 Device prn     order for DME Equipment being ordered: CPAP supplies mask, hose, filters, cushion fax to [facility] at [phone]  Disp: 10 Device Refills: prn   Class: Local Print Start: 2/10/2017 1 Device 0     order for DME Equipment being ordered: Nebulizer and tubing supplies 1 Units 0     order for DME Equipment being ordered: Glucerna daily shakes with each meal 1 Box 11     ORDER FOR DME Equipment being ordered: Power Wheelchair 1 Device 0     ORDER FOR DME Equipment being ordered: Depends briefs 1 Month 11     pantoprazole (PROTONIX) 40 MG EC tablet Take 1 tablet (40 mg) by mouth daily 30 tablet 5     phenytoin 200 MG CAPS Take 200 mg by mouth 2 times daily 60 capsule 0     polyethylene glycol (MIRALAX/GLYCOLAX) Packet Take 17 g by mouth daily Dissolved in water or juice         pregabalin (LYRICA) 150 MG capsule Take 1 capsule (150 mg) by mouth 2 times daily 14 capsule 0     risperiDONE (RISPERDAL) 0.5 MG tablet Take 0.5 mg by mouth At Bedtime       sennosides (SENOKOT) 8.6 MG tablet Take 1 tablet by mouth 2 times daily       solifenacin (VESICARE) 10 MG tablet Take 1 tablet (10 mg) by mouth daily 90 tablet 3     sucralfate (CARAFATE) 1 GM tablet Take 1 tablet (1 g) by mouth 4 times daily May dissolve in 10 mL water is necessary. (Start upon completion of carafate suspension) 120 tablet 11     traZODone (DESYREL) 50 MG tablet Take 150 mg by mouth At Bedtime        umeclidinium (INCRUSE ELLIPTA) 62.5 MCG/INH inhaler Inhale 1 puff into the lungs daily 1 Inhaler 6     VENTOLIN  (90 BASE) MCG/ACT Inhaler Inhale 2 puffs into the lungs every 6 hours as needed for shortness of breath / dyspnea or wheezing 3 Inhaler 5     zinc 50 MG TABS Take 1 tablet by mouth daily         Social History     Tobacco Use     Smoking status: Current Every Day Smoker     Packs/day: 0.25     Years: 30.00     Pack years: 7.50     Types: Cigarettes, Cigars     Last attempt to quit: 2001     Years since quittin.5     Smokeless tobacco: Never Used   Substance Use Topics     Alcohol use: No     Alcohol/week: 0.0 oz     Drug use: No     Sonyanicole Foote comes into clinic today at the request of Saadia Baez for catheter change.    Order has been verified: Yes.    Cipro 500 mg given per protocol: Yes.    Patient Active Problem List   Diagnosis     Seizure disorder (H)     Osteoporosis     Schizoaffective disorder (H)     AS (sickle cell trait) (H)     Vertigo     Person who has had sex change operation     Claudication in peripheral vascular disease (H)     Intestinal malabsorption     GIB (gastrointestinal bleeding)     Cervicalgia     Asthma     Adjustment disorder with depressed mood     Chronic pain syndrome     Open-angle glaucoma     Hx of colonic polyp     Iron deficiency anemia     Late effect of  stroke     Degeneration of intervertebral disc of lumbosacral region     Thoracic or lumbosacral neuritis or radiculitis     Cerebral infarction due to occlusion or stenosis of carotid artery     Disorder of bone and cartilage     Hereditary and idiopathic peripheral neuropathy     Androgen insensitivity syndrome     PAD (peripheral artery disease) (H)     Chronic systolic congestive heart failure (H)     Stenosis of carotid artery     Osteoarthritis     Pain in both upper extremities     Atherosclerotic peripheral vascular disease with rest pain (H)     Essential hypertension     Angina pectoris, crescendo (H)     Type 2 diabetes mellitus with diabetic peripheral angiopathy without gangrene, without long-term current use of insulin (H)     Anemia, unspecified type     Critical lower limb ischemia     Testicular feminization     Anxiety disorder due to general medical condition     Central retinal artery occlusion     Lumbosacral radiculitis     Peripheral neuropathy     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 (H)     Primary open angle glaucoma of both eyes, severe stage     Pseudophakia of right eye     Cataract, left eye     Diabetes mellitus type 2 without retinopathy (H)     Simple chronic bronchitis (H)     JOSE (obstructive sleep apnea)     Complex sleep apnea syndrome     Coronary artery disease of native artery of native heart with stable angina pectoris (H)     Ischemic cardiomyopathy     Bee sting reaction, undetermined intent, subsequent encounter     Functional incontinence     Personal history of urinary tract infection     Dysphagia     Essential hypertension with goal blood pressure less than 130/80     Hyperlipidemia LDL goal <70     Food impaction of esophagus     Incontinence     Other migraine without status migrainosus, not intractable     Morbid obesity (H)     CVA, old, hemiparesis (H)     Tobacco abuse       Allergies   Allergen Reactions     Bee Venom      Penicillins  Anaphylaxis     Other reaction(s): Skin Rash and/or Hives     Dilantin [Phenytoin] Other (See Comments)     Generic dilantin only per pt     Iodide Hives     09/11/15: Pt states she can use iodine and doesn't have any problems      Iodine-131      Novocaine [Procaine] Hives     Other reaction(s): Skin Rash and/or Hives     Tositumomab        Sonya Foote presents to clinic for scheduled [Yes] catheter exchange.  Order has been verified: Yes.    Removal:  12 Fr straight tipped silicone harrell catheter removed from suprapubic meatus without difficulty after removing 10cc's of fluid from the balloon.    Insertion:  14 Fr straight tipped latex harrell catheter inserted into suprapubic meatus in the usual sterile fashion without difficulty.  Balloon filled with 10cc's sterile H2O.  Received 30 ml clear urine output.   Catheter secured in place with leg strap: No.     Patient did tolerate procedure well.    Patient instructed as to where to call or go for pain, fever, leakage, or decreased urine flow.     The following medication was given:     MEDICATION:  Cipro  ROUTE: PO  SITE: mouth  DOSE: 500mg  LOT #: [id number]  : [employer]  EXPIRATION DATE: 10-20  NDC#: [id number]   Was there drug waste? No    Prior to administration, verified patient identity using patient's name and date of birth.  Drug Amount Wasted:  None.  Vial/Syringe: Single dose vial    Terri Aponte LPN  May 31, 2019      Patient instructed as to where to call or go for pain, fever, leakage, or decreased urine flow.     This service provided today was under the direct supervision of Dr Santillan, who was available if needed.    Terri Aponte LPN  5/31/2019  10:46 AM      Terri Aponte LPN  5/31/2019  10:44 AM

## 2019-05-31 NOTE — TELEPHONE ENCOUNTER
Reason for Call:  Other call back    Detailed comments: Patient scheduled an appointment to have her DNR paperwork done by Dr. Casey but she has questions to ask before she comes in.    Phone Number Patient can be reached at: Cell number on file:    Telephone Information:   Mobile [phone]       Best Time: any    Can we leave a detailed message on this number? YES    Call taken on 5/31/2019 at 1:53 PM by Laurie Killian

## 2019-06-05 ENCOUNTER — OFFICE VISIT (OUTPATIENT)
Dept: INTERNAL MEDICINE | Facility: CLINIC | Age: 69
End: 2019-06-05
Payer: COMMERCIAL

## 2019-06-05 VITALS
OXYGEN SATURATION: 97 % | SYSTOLIC BLOOD PRESSURE: 132 MMHG | DIASTOLIC BLOOD PRESSURE: 74 MMHG | HEART RATE: 92 BPM | TEMPERATURE: 98.5 F

## 2019-06-05 DIAGNOSIS — G40.909 SEIZURE DISORDER (H): Primary | ICD-10-CM

## 2019-06-05 DIAGNOSIS — R35.0 URINARY FREQUENCY: ICD-10-CM

## 2019-06-05 DIAGNOSIS — I10 ESSENTIAL HYPERTENSION: ICD-10-CM

## 2019-06-05 DIAGNOSIS — E78.5 HYPERLIPIDEMIA LDL GOAL <100: ICD-10-CM

## 2019-06-05 DIAGNOSIS — Z89.612 S/P AKA (ABOVE KNEE AMPUTATION) BILATERAL (H): ICD-10-CM

## 2019-06-05 DIAGNOSIS — J44.9 CHRONIC OBSTRUCTIVE PULMONARY DISEASE, UNSPECIFIED COPD TYPE (H): ICD-10-CM

## 2019-06-05 DIAGNOSIS — E11.9 DIABETES MELLITUS TYPE 2 WITHOUT RETINOPATHY (H): Primary | ICD-10-CM

## 2019-06-05 DIAGNOSIS — R11.0 NAUSEA: ICD-10-CM

## 2019-06-05 DIAGNOSIS — E11.9 DIABETES MELLITUS TYPE 2 WITHOUT RETINOPATHY (H): ICD-10-CM

## 2019-06-05 DIAGNOSIS — Z86.79 HISTORY OF CORONARY ARTERY DISEASE: ICD-10-CM

## 2019-06-05 DIAGNOSIS — Z89.611 S/P AKA (ABOVE KNEE AMPUTATION) BILATERAL (H): ICD-10-CM

## 2019-06-05 DIAGNOSIS — K21.9 GASTROESOPHAGEAL REFLUX DISEASE WITHOUT ESOPHAGITIS: ICD-10-CM

## 2019-06-05 DIAGNOSIS — F25.9 SCHIZOAFFECTIVE DISORDER, UNSPECIFIED TYPE (H): ICD-10-CM
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
## 2019-06-05 PROBLEM — T18.128A FOOD IMPACTION OF ESOPHAGUS: Status: RESOLVED | Noted: 2017-11-26 | Resolved: 2019-06-05

## 2019-06-05 PROCEDURE — 99214 OFFICE O/P EST MOD 30 MIN: CPT | Performed by: INTERNAL MEDICINE

## 2019-06-05 RX ORDER — ESCITALOPRAM OXALATE 10 MG/1
TABLET ORAL
Qty: 30 TABLET | Refills: 11 | Status: SHIPPED | OUTPATIENT
Start: 2019-06-05 | End: 2020-07-30

## 2019-06-05 RX ORDER — ATORVASTATIN CALCIUM 40 MG/1
TABLET, FILM COATED ORAL
Qty: 30 TABLET | Refills: 11 | Status: SHIPPED | OUTPATIENT
Start: 2019-06-05 | End: 2020-07-30

## 2019-06-05 RX ORDER — RISPERIDONE 0.5 MG/1
TABLET ORAL
Qty: 30 TABLET | Refills: 5 | Status: SHIPPED | OUTPATIENT
Start: 2019-06-05 | End: 2020-07-30

## 2019-06-05 RX ORDER — LISINOPRIL 20 MG/1
TABLET ORAL
Qty: 30 TABLET | Refills: 11 | Status: SHIPPED | OUTPATIENT
Start: 2019-06-05 | End: 2020-07-30

## 2019-06-05 RX ORDER — SOLIFENACIN SUCCINATE 10 MG/1
TABLET, FILM COATED ORAL
Qty: 30 TABLET | Refills: 11 | Status: SHIPPED | OUTPATIENT
Start: 2019-06-05 | End: 2020-07-30

## 2019-06-05 RX ORDER — ALBUTEROL SULFATE 0.83 MG/ML
SOLUTION RESPIRATORY (INHALATION)
Qty: 360 ML | Refills: 11 | Status: SHIPPED | OUTPATIENT
Start: 2019-06-05 | End: 2020-09-14

## 2019-06-05 RX ORDER — LEVETIRACETAM 500 MG/1
TABLET ORAL
Qty: 60 TABLET | Refills: 5 | Status: SHIPPED | OUTPATIENT
Start: 2019-06-05 | End: 2020-07-30

## 2019-06-05 RX ORDER — PANTOPRAZOLE SODIUM 40 MG/1
TABLET, DELAYED RELEASE ORAL
Qty: 30 TABLET | Refills: 11 | Status: SHIPPED | OUTPATIENT
Start: 2019-06-05 | End: 2020-07-30

## 2019-06-05 RX ORDER — PHENYTOIN SODIUM 100 MG/1
CAPSULE, EXTENDED RELEASE ORAL
Qty: 120 CAPSULE | Refills: 11 | Status: SHIPPED | OUTPATIENT
Start: 2019-06-05

## 2019-06-05 RX ORDER — METOPROLOL SUCCINATE 50 MG/1
TABLET, EXTENDED RELEASE ORAL
Qty: 30 TABLET | Refills: 11 | Status: SHIPPED | OUTPATIENT
Start: 2019-06-05 | End: 2020-07-30

## 2019-06-05 RX ORDER — TRAZODONE HYDROCHLORIDE 150 MG/1
TABLET ORAL
Qty: 30 TABLET | Refills: 11 | Status: SHIPPED | OUTPATIENT
Start: 2019-06-05 | End: 2020-07-30

## 2019-06-05 RX ORDER — ALBUTEROL SULFATE 90 UG/1
2 AEROSOL, METERED RESPIRATORY (INHALATION) EVERY 6 HOURS PRN
Qty: 8.5 G | Refills: 11 | Status: SHIPPED | OUTPATIENT
Start: 2019-06-05 | End: 2019-08-05

## 2019-06-05 NOTE — LETTER
[facility][1]  [address] [address]  [phone]      6/5/2019       Sonya Foote  [address]   [place] [facility] [address]        Dear Sonya,    I noticed that you had a liver function panel done by another provider and your liver function tests were just slightly high.  It might not be a bad idea to repeat that at your convenience.  I will place orders for you to get your labs done at your convenience and you may make a lab appointment to get her liver panel rechecked.    Sincerely,      Allan Casey MD  Internal Medicine

## 2019-06-05 NOTE — NURSING NOTE
"Vital signs:  Temp: 98.5  F (36.9  C) Temp src: Oral BP: 152/84 Pulse: 92     SpO2: 97 %       Weight: (in wheel chair)  Estimated body mass index is 52.09 kg/m  as calculated from the following:    Height as of 5/29/19: 1.092 m (3' 7\").    Weight as of 5/29/19: 62.1 kg (137 lb).          "

## 2019-06-05 NOTE — TELEPHONE ENCOUNTER
"Requested Prescriptions   Pending Prescriptions Disp Refills     phenytoin (DILANTIN) 100 MG capsule [Pharmacy Med Name: PHENYTOIN 100MG ER CAP] 120 capsule 11     Sig: TAKE 2 CAPS (200MG) BY MOUTH TWICE A DAY       Anti-Seizure Meds Protocol  Failed - 6/5/2019 11:25 AM        Failed - Review Authorizing provider's last note.      Refer to last progress notes: confirm request is for original authorizing provider (cannot be through other providers).          Failed - Normal CBC on file in past 26 months     Recent Labs   Lab Test 07/04/18  0732   WBC 13.9*   RBC 4.63   HGB 14.0   HCT 39.4                    Failed - Normal ALT or AST on file in past 26 months     Recent Labs   Lab Test 07/04/18  0732   ALT 66*     Recent Labs   Lab Test 07/04/18  0732   AST 53*             Passed - Recent (12 mo) or future (30 days) visit within the authorizing provider's specialty     Patient had office visit in the last 12 months or has a visit in the next 30 days with authorizing provider or within the authorizing provider's specialty.  See \"Patient Info\" tab in inbasket, or \"Choose Columns\" in Meds & Orders section of the refill encounter.              Passed - Normal serum creatinine on file in past 26 months     Recent Labs   Lab Test 04/17/19  1511  02/25/16  1440   CR 0.70   < >  --    CRPOC  --   --  0.5    < > = values in this interval not displayed.             Passed - Normal platelet count on file in past 26 months     Recent Labs   Lab Test 07/04/18  0732                  Passed - Dilantin level within therapeutic range in past 26 months     Recent Labs   Lab Test 08/14/18  1102   DILANTIN 16.6       Dilantin level must be checked 2-4 weeks after dosage change.          Passed - Medication is active on med list        Passed - No active pregnancy on record        Passed - No positive pregnancy test in last 12 months        levETIRAcetam (KEPPRA) 500 MG tablet [Pharmacy Med Name: LEVETIRACETAM 500MG TABLET] " "60 tablet 5     Sig: TAKE 1 TAB BY MOUTH TWICE A DAY       Anti-Seizure Meds Protocol  Failed - 6/5/2019 11:25 AM        Failed - Review Authorizing provider's last note.      Refer to last progress notes: confirm request is for original authorizing provider (cannot be through other providers).          Failed - Normal CBC on file in past 26 months     Recent Labs   Lab Test 07/04/18  0732   WBC 13.9*   RBC 4.63   HGB 14.0   HCT 39.4                    Failed - Normal ALT or AST on file in past 26 months     Recent Labs   Lab Test 07/04/18  0732   ALT 66*     Recent Labs   Lab Test 07/04/18  0732   AST 53*             Passed - Recent (12 mo) or future (30 days) visit within the authorizing provider's specialty     Patient had office visit in the last 12 months or has a visit in the next 30 days with authorizing provider or within the authorizing provider's specialty.  See \"Patient Info\" tab in inbasket, or \"Choose Columns\" in Meds & Orders section of the refill encounter.              Passed - Normal serum creatinine on file in past 26 months     Recent Labs   Lab Test 04/17/19  1511  02/25/16  1440   CR 0.70   < >  --    CRPOC  --   --  0.5    < > = values in this interval not displayed.             Passed - Normal platelet count on file in past 26 months     Recent Labs   Lab Test 07/04/18  0732                  Passed - Dilantin level within therapeutic range in past 26 months     Recent Labs   Lab Test 08/14/18  1102   DILANTIN 16.6       Dilantin level must be checked 2-4 weeks after dosage change.          Passed - Medication is active on med list        Passed - No active pregnancy on record        Passed - No positive pregnancy test in last 12 months        escitalopram (LEXAPRO) 10 MG tablet [Pharmacy Med Name: ESCITALOPRAM 10MG TABLET] 30 tablet 11     Sig: TAKE 1 TAB BY MOUTH ONCE DAILY       SSRIs Protocol Passed - 6/5/2019 11:25 AM        Passed - Recent (12 mo) or future (30 days) visit " "within the authorizing provider's specialty     Patient had office visit in the last 12 months or has a visit in the next 30 days with authorizing provider or within the authorizing provider's specialty.  See \"Patient Info\" tab in inbasket, or \"Choose Columns\" in Meds & Orders section of the refill encounter.              Passed - Medication is active on med list        Passed - Patient is age 18 or older        Passed - No active pregnancy on record        Passed - No positive pregnancy test in last 12 months        atorvastatin (LIPITOR) 40 MG tablet [Pharmacy Med Name: ATORVASTATIN 40MG TABLET] 30 tablet 11     Sig: TAKE 1 TAB BY MOUTH ONCE DAILY       Statins Protocol Passed - 6/5/2019 11:25 AM        Passed - LDL on file in past 12 months     Recent Labs   Lab Test 08/14/18  1102   LDL 74             Passed - No abnormal creatine kinase in past 12 months     No lab results found.             Passed - Recent (12 mo) or future (30 days) visit within the authorizing provider's specialty     Patient had office visit in the last 12 months or has a visit in the next 30 days with authorizing provider or within the authorizing provider's specialty.  See \"Patient Info\" tab in inbasket, or \"Choose Columns\" in Meds & Orders section of the refill encounter.              Passed - Medication is active on med list        Passed - Patient is age 18 or older        Passed - No active pregnancy on record        Passed - No positive pregnancy test in past 12 months        pantoprazole (PROTONIX) 40 MG EC tablet [Pharmacy Med Name: PANTOPRAZOLE 40MG DR TABLET] 30 tablet 11     Sig: TAKE 1 TAB BY MOUTH ONCE DAILY       PPI Protocol Failed - 6/5/2019 11:25 AM        Failed - No diagnosis of osteoporosis on record        Passed - Not on Clopidogrel (unless Pantoprazole ordered)        Passed - Recent (12 mo) or future (30 days) visit within the authorizing provider's specialty     Patient had office visit in the last 12 months or has " "a visit in the next 30 days with authorizing provider or within the authorizing provider's specialty.  See \"Patient Info\" tab in inbasket, or \"Choose Columns\" in Meds & Orders section of the refill encounter.              Passed - Medication is active on med list        Passed - Patient is age 18 or older        Passed - No active pregnacy on record        Passed - No positive pregnancy test in past 12 months        lisinopril (PRINIVIL/ZESTRIL) 20 MG tablet [Pharmacy Med Name: LISINOPRIL 20MG TABLET] 30 tablet 11     Sig: TAKE 1 TAB BY MOUTH ONCE DAILY       ACE Inhibitors (Including Combos) Protocol Passed - 6/5/2019 11:25 AM        Passed - Blood pressure under 140/90 in past 12 months     BP Readings from Last 3 Encounters:   06/05/19 132/74   05/29/19 114/76   05/23/19 (!) 81/48                 Passed - Recent (12 mo) or future (30 days) visit within the authorizing provider's specialty     Patient had office visit in the last 12 months or has a visit in the next 30 days with authorizing provider or within the authorizing provider's specialty.  See \"Patient Info\" tab in inbasket, or \"Choose Columns\" in Meds & Orders section of the refill encounter.              Passed - Medication is active on med list        Passed - Patient is age 18 or older        Passed - No active pregnancy on record        Passed - Normal serum creatinine on file in past 12 months     Recent Labs   Lab Test 04/17/19  1511  02/25/16  1440   CR 0.70   < >  --    CRPOC  --   --  0.5    < > = values in this interval not displayed.             Passed - Normal serum potassium on file in past 12 months     Recent Labs   Lab Test 04/17/19  1511   POTASSIUM 4.1             Passed - No positive pregnancy test within past 12 months        risperiDONE (RISPERDAL) 0.5 MG tablet [Pharmacy Med Name: RISPERIDONE 0.5MG TABLET] 30 tablet 5     Sig: TAKE 1 TAB BY MOUTH AT BEDTIME       Antipsychotic Medications Passed - 6/5/2019 11:25 AM        Passed - " "Blood pressure under 140/90 in past 12 months     BP Readings from Last 3 Encounters:   06/05/19 132/74   05/29/19 114/76   05/23/19 (!) 81/48                 Passed - Patient is 12 years of age or older        Passed - Lipid panel on file within the past 12 months     Recent Labs   Lab Test 08/14/18  1102  09/15/15  0749   CHOL 139   < > 132   TRIG 82   < > 62   HDL 49*   < > 49*   LDL 74   < > 71   NHDL 90   < >  --    VLDL  --   --  12   CHOLHDLRATIO  --   --  2.7    < > = values in this interval not displayed.               Passed - CBC on file in past 12 months     Recent Labs   Lab Test 07/04/18  0732   WBC 13.9*   RBC 4.63   HGB 14.0   HCT 39.4                    Passed - Heart Rate on file within past 12 months     Pulse Readings from Last 3 Encounters:   06/05/19 92   05/29/19 94   05/23/19 80               Passed - A1c or Glucose on file in past 12 months     Recent Labs   Lab Test 04/17/19  1511   GLC 80   A1C 5.0       Please review patients last 3 weights. If a weight gain of >10 lbs exists, you may refill the prescription once after instructing the patient to schedule an appointment within the next 30 days.    Wt Readings from Last 3 Encounters:   05/29/19 62.1 kg (137 lb)   05/23/19 62.4 kg (137 lb 8 oz)   05/14/19 62.1 kg (137 lb)             Passed - Medication is active on med list        Passed - Patient is not pregnant        Passed - No positve pregnancy test on file in past 12 months        Passed - Recent (6 mo) or future (30 days) visit within the authorizing provider's specialty     Patient had office visit in the last 6 months or has a visit in the next 30 days with authorizing provider or within the authorizing provider's specialty.  See \"Patient Info\" tab in inbasket, or \"Choose Columns\" in Meds & Orders section of the refill encounter.            metoprolol succinate ER (TOPROL-XL) 50 MG 24 hr tablet [Pharmacy Med Name: METOPROLOL SUCC 50MG ER TAB] 30 tablet 11     Sig: TAKE 1 TAB " "BY MOUTH ONCE DAILY       Beta-Blockers Protocol Passed - 6/5/2019 11:25 AM        Passed - Blood pressure under 140/90 in past 12 months     BP Readings from Last 3 Encounters:   06/05/19 132/74   05/29/19 114/76   05/23/19 (!) 81/48                 Passed - Patient is age 6 or older        Passed - Recent (12 mo) or future (30 days) visit within the authorizing provider's specialty     Patient had office visit in the last 12 months or has a visit in the next 30 days with authorizing provider or within the authorizing provider's specialty.  See \"Patient Info\" tab in inbasket, or \"Choose Columns\" in Meds & Orders section of the refill encounter.              Passed - Medication is active on med list        traZODone (DESYREL) 150 MG tablet [Pharmacy Med Name: TRAZODONE 150MG TABLET] 30 tablet 11     Sig: TAKE 1 TAB BY MOUTH AT BEDTIME       Serotonin Modulators Passed - 6/5/2019 11:25 AM        Passed - Recent (12 mo) or future (30 days) visit within the authorizing provider's specialty     Patient had office visit in the last 12 months or has a visit in the next 30 days with authorizing provider or within the authorizing provider's specialty.  See \"Patient Info\" tab in inbasket, or \"Choose Columns\" in Meds & Orders section of the refill encounter.              Passed - Medication is active on med list        Passed - Patient is age 18 or older        Passed - No active pregnancy on record        Passed - No positive pregnancy test in past 12 months        solifenacin (VESICARE) 10 MG tablet [Pharmacy Med Name: SOLIFENACIN SUCCINATE 10MG TAB] 30 tablet 11     Sig: TAKE 1 TAB BY MOUTH ONCE DAILY       Muscarinic Antagonists (Urinary Incontinence Agents) Failed - 6/5/2019 11:25 AM        Failed - Patient does not have a diagnosis of glaucoma on the problem list     If glaucoma diagnosis is new, refer refill to physician.          Passed - Recent (12 mo) or future (30 days) visit within the authorizing provider's " "specialty     Patient had office visit in the last 12 months or has a visit in the next 30 days with authorizing provider or within the authorizing provider's specialty.  See \"Patient Info\" tab in inbasket, or \"Choose Columns\" in Meds & Orders section of the refill encounter.              Passed - Medication is active on med list        Passed - Patient is 18 years of age or older        metFORMIN (GLUCOPHAGE) 500 MG tablet [Pharmacy Med Name: METFORMIN 500MG TABLET] 30 tablet 5     Sig: TAKE 1 TAB BY MOUTH IN THE EVENING WITH DINNER       Biguanide Agents Failed - 6/5/2019 11:25 AM        Failed - Patient does NOT have a diagnosis of CHF.        Passed - Blood pressure less than 140/90 in past 6 months     BP Readings from Last 3 Encounters:   06/05/19 132/74   05/29/19 114/76   05/23/19 (!) 81/48                 Passed - Patient has documented LDL within the past 12 mos.     Recent Labs   Lab Test 08/14/18  1102   LDL 74             Passed - Patient has had a Microalbumin in the past 15 mos.     Recent Labs   Lab Test 04/17/19  1511   MICROL 68   UMALCR 357.07*             Passed - Patient is age 10 or older        Passed - Patient has documented A1c within the specified period of time.     If HgbA1C is 8 or greater, it needs to be on file within the past 3 months.  If less than 8, must be on file within the past 6 months.     Recent Labs   Lab Test 04/17/19  1511  11/10/17   A1C 5.0   < >  --    HEMOGLOBINA1  --   --  5.1  5.1    < > = values in this interval not displayed.             Passed - Patient's CR is NOT>1.4 OR Patient's EGFR is NOT<45 within past 12 mos.     Recent Labs   Lab Test 04/17/19 1511 02/25/16  1440   GFR  --   --  100   GFRB  --   --  116   GFRESTIMATED 88   < >  --    GFRESTBLACK >90   < >  --     < > = values in this interval not displayed.       Recent Labs   Lab Test 04/17/19 1511 02/25/16  1440   CR 0.70   < >  --    CRPOC  --   --  0.5    < > = values in this interval not " "displayed.             Passed - Medication is active on med list        Passed - Patient is not pregnant        Passed - Patient has not had a positive pregnancy test within the past 12 mos.         Passed - Recent (6 mo) or future (30 days) visit within the authorizing provider's specialty     Patient had office visit in the last 6 months or has a visit in the next 30 days with authorizing provider or within the authorizing provider's specialty.  See \"Patient Info\" tab in inbasket, or \"Choose Columns\" in Meds & Orders section of the refill encounter.              "

## 2019-06-05 NOTE — PROGRESS NOTES
Subjective     Sonya Foote is a 69 year old female who presents to clinic today for the following health issues:    HPI     Patient is here today for general follow-up for medication renewal as well as the fact she needs refills on her nebulized solution and albuterol inhaler.  She has recently returned from [place] and is now living in a prior home where she has been before.  She apparently needs a face-to-face visit also for a lifting recliner to help her mobilize more efficiently in her home environment.  She has had bilateral above-the-knee amputations in the past and is currently wheelchair-bound.    She otherwise states she is doing fairly well and comes with forms that need to be signed for her living situation.  There are for medications that are highlighted that are not covered under her insurance all of which are over-the-counter products.    Patient Active Problem List   Diagnosis     Seizure disorder (H)     Osteoporosis     Schizoaffective disorder (H)     AS (sickle cell trait) (H)     Vertigo     Person who has had sex change operation     Claudication in peripheral vascular disease (H)     Intestinal malabsorption     GIB (gastrointestinal bleeding)     Cervicalgia     Asthma     Adjustment disorder with depressed mood     Chronic pain syndrome     Open-angle glaucoma     Hx of colonic polyp     Iron deficiency anemia     Late effect of stroke     Degeneration of intervertebral disc of lumbosacral region     Thoracic or lumbosacral neuritis or radiculitis     Cerebral infarction due to occlusion or stenosis of carotid artery     Disorder of bone and cartilage     Hereditary and idiopathic peripheral neuropathy     Androgen insensitivity syndrome     PAD (peripheral artery disease) (H)     Chronic systolic congestive heart failure (H)     Stenosis of carotid artery     Osteoarthritis     Pain in both upper extremities     Atherosclerotic peripheral vascular disease with rest pain (H)     Essential  hypertension     Angina pectoris, crescendo (H)     Type 2 diabetes mellitus with diabetic peripheral angiopathy without gangrene, without long-term current use of insulin (H)     Anemia, unspecified type     Critical lower limb ischemia     Testicular feminization     Anxiety disorder due to general medical condition     Central retinal artery occlusion     Lumbosacral radiculitis     Peripheral neuropathy     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 (H)     Primary open angle glaucoma of both eyes, severe stage     Pseudophakia of right eye     Cataract, left eye     Diabetes mellitus type 2 without retinopathy (H)     Simple chronic bronchitis (H)     JOSE (obstructive sleep apnea)     Complex sleep apnea syndrome     Coronary artery disease of native artery of native heart with stable angina pectoris (H)     Ischemic cardiomyopathy     Bee sting reaction, undetermined intent, subsequent encounter     Functional incontinence     Personal history of urinary tract infection     Dysphagia     Hyperlipidemia LDL goal <70     Incontinence     Other migraine without status migrainosus, not intractable     Morbid obesity (H)     CVA, old, hemiparesis (H)     Tobacco abuse     Past Surgical History:   Procedure Laterality Date     AMPUTATE LEG ABOVE KNEE Left 6/11/2016    Procedure: AMPUTATE LEG ABOVE KNEE;  Surgeon: Mello Rodriguez MD;  [facility]     AMPUTATE LEG BELOW KNEE Right 11/7/2016    Procedure: AMPUTATE LEG BELOW KNEE;  Surgeon: Savannah Durant MD;  [facility]     AMPUTATE REVISION STUMP LOWER EXTREMITY Right 11/11/2016    Procedure: AMPUTATE REVISION STUMP LOWER EXTREMITY;  Surgeon: Savannah Durant MD;  [facility]     AMPUTATE REVISION STUMP LOWER EXTREMITY Right 11/16/2016    Procedure: AMPUTATE REVISION STUMP LOWER EXTREMITY;  Surgeon: Savannah Durant MD;  [facility]     AMPUTATE TOE(S) Right 1/5/2016    Procedure: AMPUTATE TOE(S);  Surgeon: Mello Gaines DPM;   Location: [facility]     ANGIOGRAM Bilateral 11/21/2014    Procedure: ANGIOGRAM;  Surgeon: Savannah Durant MD;  [facility]     ANGIOGRAM Left 1/16/2015    Procedure: ANGIOGRAM;  Surgeon: Savannah Durant MD;  [facility]     ANGIOGRAM Bilateral 9/14/2015    Procedure: ANGIOGRAM;  Surgeon: Savannah Durant MD;  [facility]     ANGIOGRAM Left 10/12/2015    Procedure: ANGIOGRAM;  Surgeon: Savannah Durant MD;  [facility]     ANGIOGRAM Right 6/6/2016    Procedure: ANGIOGRAM;  Surgeon: Savannah Durant MD;  [facility]     ANGIOPLASTY Right 6/6/2016    Procedure: ANGIOPLASTY;  Surgeon: Savannah Durant MD;  [facility]     APPENDECTOMY       BREAST SURGERY      right breast bx (benign)     CATARACT IOL, RT/LT Right      CHOLECYSTECTOMY       COLONOSCOPY N/A 8/25/2014    Procedure: COLONOSCOPY;  Surgeon: Mello Ferrer MD;  [facility]     COLONOSCOPY WITH CO2 INSUFFLATION N/A 8/20/2014    Procedure: COLONOSCOPY WITH CO2 INSUFFLATION;  Surgeon: Duane, William Charles, MD;  [facility]     CYSTOSTOMY, INSERT TUBE SUPRAPUBIC, COMBINED N/A 1/16/2018    Procedure: COMBINED CYSTOSTOMY, INSERT TUBE SUPRAPUBIC;  Cystoscopy, Intraoperative Ultrasound, Suprapubic Tube Placement;  Surgeon: Keanu Dawson MD;  [facility]     ENDARTERECTOMY FEMORAL  5/23/2014    Procedure: ENDARTERECTOMY FEMORAL;  Surgeon: Jason Joshi MD;  [facility]     ESOPHAGOSCOPY, GASTROSCOPY, DUODENOSCOPY (EGD), COMBINED  12/14/2012    Procedure: COMBINED ESOPHAGOSCOPY, GASTROSCOPY, DUODENOSCOPY (EGD), BIOPSY SINGLE OR MULTIPLE;  ESOPHAGOSCOPY, GASTROSCOPY, DUODENOSCOPY (EGD), DILATATION ;  Surgeon: Elizabeth Stevenson MD;  Location:  [facility]     ESOPHAGOSCOPY, GASTROSCOPY, DUODENOSCOPY (EGD), COMBINED  12/31/2013    Procedure: COMBINED ESOPHAGOSCOPY, GASTROSCOPY, DUODENOSCOPY (EGD), BIOPSY SINGLE OR MULTIPLE;;  Surgeon: Clemente Lopez MD;  [facility]     ESOPHAGOSCOPY, GASTROSCOPY, DUODENOSCOPY (EGD), COMBINED   4/1/2014    Procedure: COMBINED ESOPHAGOSCOPY, GASTROSCOPY, DUODENOSCOPY (EGD);;  Surgeon: Clemente Lopez MD;  [facility]     ESOPHAGOSCOPY, GASTROSCOPY, DUODENOSCOPY (EGD), COMBINED  6/28/2014    Procedure: COMBINED ESOPHAGOSCOPY, GASTROSCOPY, DUODENOSCOPY (EGD);  Surgeon: Clemente Lopez MD;  [facility]     ESOPHAGOSCOPY, GASTROSCOPY, DUODENOSCOPY (EGD), COMBINED N/A 8/20/2014    Procedure: COMBINED ESOPHAGOSCOPY, GASTROSCOPY, DUODENOSCOPY (EGD), BIOPSY SINGLE OR MULTIPLE;  Surgeon: Duane, William Charles, MD;  [facility]     ESOPHAGOSCOPY, GASTROSCOPY, DUODENOSCOPY (EGD), COMBINED N/A 8/22/2014    Procedure: COMBINED ESOPHAGOSCOPY, GASTROSCOPY, DUODENOSCOPY (EGD), BIOPSY SINGLE OR MULTIPLE;  Surgeon: Mello Ferrer MD;  [facility]     ESOPHAGOSCOPY, GASTROSCOPY, DUODENOSCOPY (EGD), COMBINED N/A 10/2/2014    Procedure: COMBINED ESOPHAGOSCOPY, GASTROSCOPY, DUODENOSCOPY (EGD), BIOPSY SINGLE OR MULTIPLE;  Surgeon: Remy Haskins MD;  [facility]     ESOPHAGOSCOPY, GASTROSCOPY, DUODENOSCOPY (EGD), COMBINED Left 12/15/2014    Procedure: COMBINED ESOPHAGOSCOPY, GASTROSCOPY, DUODENOSCOPY (EGD), BIOPSY SINGLE OR MULTIPLE;  Surgeon: Remy Haskins MD;  [facility]     ESOPHAGOSCOPY, GASTROSCOPY, DUODENOSCOPY (EGD), COMBINED N/A 2/25/2015    Procedure: COMBINED ENDOSCOPIC ULTRASOUND, ESOPHAGOSCOPY, GASTROSCOPY, DUODENOSCOPY (EGD), FINE NEEDLE ASPIRATE/BIOPSY;  Surgeon: Clemente Lugo MD;  [facility]     ESOPHAGOSCOPY, GASTROSCOPY, DUODENOSCOPY (EGD), COMBINED Left 2/25/2015    Procedure: COMBINED ESOPHAGOSCOPY, GASTROSCOPY, DUODENOSCOPY (EGD), BIOPSY SINGLE OR MULTIPLE;  Surgeon: Clemente Lugo MD;  [facility]     ESOPHAGOSCOPY, GASTROSCOPY, DUODENOSCOPY (EGD), COMBINED N/A 9/25/2016    Procedure: COMBINED ESOPHAGOSCOPY, GASTROSCOPY, DUODENOSCOPY (EGD);  Surgeon: Aziza Patiño MD;  Location: [facility]     ESOPHAGOSCOPY, GASTROSCOPY, DUODENOSCOPY (EGD),  COMBINED N/A 2017    Procedure: COMBINED ESOPHAGOSCOPY, GASTROSCOPY, DUODENOSCOPY (EGD), BIOPSY SINGLE OR MULTIPLE;  Surgeon: Clemente Lopez MD;  [facility]     ESOPHAGOSCOPY, GASTROSCOPY, DUODENOSCOPY (EGD), COMBINED N/A 2017    Procedure: COMBINED ESOPHAGOSCOPY, GASTROSCOPY, DUODENOSCOPY (EGD), REMOVE FOREIGN BODY;  Esophagogastroduodenoscopy with foreign body extraction  ;  Surgeon: Herberth Castrejon MD;  [facility]     ESOPHAGOSCOPY, GASTROSCOPY, DUODENOSCOPY (EGD), COMBINED N/A 2017    Procedure: COMBINED ESOPHAGOSCOPY, GASTROSCOPY, DUODENOSCOPY (EGD), REMOVE FOREIGN BODY;;  Surgeon: Herberth Castrejon MD;  [facility]     FASCIOTOMY LOWER EXTREMITY Left 6/10/2016    Procedure: FASCIOTOMY LOWER EXTREMITY;  Surgeon: Mello Rodriguez MD;  [facility]     HC CAPSULE ENDOSCOPY N/A 2014    Procedure: CAPSULE/PILL CAM ENDOSCOPY;  Surgeon: Remy Haskins MD;  [facility]     HC CAPSULE ENDOSCOPY N/A 10/2/2014    Procedure: CAPSULE/PILL CAM ENDOSCOPY;  Surgeon: Remy Haskins MD;  [facility]     ORTHOPEDIC SURGERY      broken wrist repair     SEX TRANSFORMATION SURGERY, MALE TO FEMALE      1974     SINUS SURGERY      cyst removed     TONSILLECTOMY       VASCULAR SURGERY      Left carotid stent       Social History     Tobacco Use     Smoking status: Current Every Day Smoker     Packs/day: 0.25     Years: 30.00     Pack years: 7.50     Types: Cigarettes, Cigars     Last attempt to quit: 2001     Years since quittin.6     Smokeless tobacco: Never Used   Substance Use Topics     Alcohol use: No     Alcohol/week: 0.0 oz     Family History   Problem Relation Age of Onset     Dementia Mother      Glaucoma Mother      Diabetes Mother         may have been type 1, childhood     Coronary Artery Disease Mother         MI     Glaucoma Father      Diabetes Father         may hev been type 1 - ??     Heart Failure Father      Cancer Maternal Aunt          leukemia     Schizophrenia Brother      Depression Brother      Suicide Sister      Depression Sister      Diabetes Sister      Cancer Maternal Aunt         ovarian     Glaucoma Maternal Grandmother      Diabetes Maternal Grandmother      Glaucoma Maternal Grandfather      Diabetes Maternal Grandfather      Glaucoma Paternal Grandmother      Diabetes Paternal Grandmother      Glaucoma Paternal Grandfather      Diabetes Paternal Grandfather      Breast Cancer Sister      Cerebrovascular Disease Brother      Colon Cancer No family hx of      Macular Degeneration No family hx of          Current Outpatient Medications   Medication Sig Dispense Refill     albuterol (PROVENTIL) (2.5 MG/3ML) 0.083% neb solution INHALE 1 VIAL VIA NEBULIZER EVERY 6 HOURS AS NEEDED 360 mL 11     order for DME Equipment being ordered: lift recliner 1 Device 0     VENTOLIN  (90 Base) MCG/ACT inhaler Inhale 2 puffs into the lungs every 6 hours as needed for shortness of breath / dyspnea or wheezing 8.5 g 11     ACETAMINOPHEN PO Take 1,000 mg by mouth every 6 hours as needed for pain Not to exceed 4000 mg/day       ADVAIR DISKUS 250-50 MCG/DOSE diskus inhaler Inhale 1 puff into the lungs 2 times daily 60 Inhaler 11     alendronate (FOSAMAX) 70 MG tablet Take 1 tablet (70 mg) by mouth with 8oz water every 7 days 30 minutes before breakfast and remain upright during this time. 12 tablet 3     aspirin EC 81 MG EC tablet Take 1 tablet (81 mg) by mouth daily 90 tablet 3     atorvastatin (LIPITOR) 40 MG tablet Take 1 tablet (40 mg) by mouth daily 90 tablet 2     blood glucose monitoring (ONE TOUCH ULTRA 2) meter device kit Use to test blood sugars 3 times daily or as directed. 1 kit 0     blood glucose monitoring (ONE TOUCH ULTRA) test strip Use to test blood sugars 3 times daily or as directed. 3 Box 3     blood glucose monitoring (ONE TOUCH ULTRASOFT) lancets Use to test blood sugar 3 times daily or as directed. 100 each PRN      brimonidine (ALPHAGAN) 0.2 % ophthalmic solution Place 1 drop into the right eye 2 times daily 1 Bottle 11     calcium citrate-vitamin D (CITRACAL) 315-250 MG-UNIT TABS Take 2 tablets by mouth daily 120 tablet 5     cetirizine (ZYRTEC) 10 MG tablet Take 1 tablet (10 mg) by mouth every evening 30 tablet 3     Cholecalciferol (VITAMIN D) 2000 UNITS tablet Take 2,000 Units by mouth daily. 100 tablet 3     clotrimazole (LOTRIMIN) 1 % cream INSERT 1 APPLICATORFUL VAGINALLY TWICE A DAY FOR VAGINAL PAIN 12 g 0     CYANOCOBALAMIN PO Take 2,000 mcg by mouth daily       diclofenac (VOLTAREN) 1 % GEL topical gel Apply 2 g topically 4 times daily to hands 100 g 11     dorzolamide (TRUSOPT) 2 % ophthalmic solution Place 1 drop into the right eye 2 times daily 1 Bottle 11     EPINEPHrine (EPIPEN/ADRENACLICK/OR ANY BX GENERIC EQUIV) 0.3 MG/0.3ML injection 2-pack Inject 0.3 mLs (0.3 mg) into the muscle as needed for anaphylaxis 0.6 mL 1     escitalopram (LEXAPRO) 10 MG tablet Take 10 mg by mouth daily        ferrous sulfate (IRON) 325 (65 FE) MG tablet Take 1 tablet (325 mg) by mouth 2 times daily With meals 60 tablet 2     fluticasone (FLONASE) 50 MCG/ACT nasal spray Spray 1 spray into both nostrils daily       lactulose (CHRONULAC) 10 GM/15ML solution Take 20 g by mouth as needed for constipation       latanoprost (XALATAN) 0.005 % ophthalmic solution Place 1 drop into both eyes At Bedtime 2.5 mL 11     levETIRAcetam (KEPPRA) 500 MG tablet Take 1 tablet (500 mg) by mouth 2 times daily 180 tablet 1     lisinopril (PRINIVIL/ZESTRIL) 20 MG tablet TAKE 1 TABLET BY MOUTH DAILY DX:HYPERTENSION 90 tablet 3     metFORMIN (GLUCOPHAGE-XR) 500 MG 24 hr tablet Take 1 tablet (500 mg) by mouth daily (with dinner) 90 tablet 3     metoprolol succinate (TOPROL-XL) 50 MG 24 hr tablet TAKE 1 TABLET BY MOUTH DAILY 90 tablet 3     Multiple Vitamin (MULTIVITAMIN OR) Take 1 tablet by mouth daily        nicotine (NICODERM CQ) 14 MG/24HR 24 hr patch  Place 1 patch onto the skin every 24 hours 30 patch 3     nitroglycerin (NITROSTAT) 0.4 MG SL tablet Place 1 tablet (0.4 mg) under the tongue every 5 minutes as needed for chest pain if you are still having symptoms after 3 doses (15 minutes) call 911. 25 tablet 1     ondansetron (ZOFRAN-ODT) 8 MG ODT tab Take 1 tablet (8 mg) by mouth every 8 hours as needed 30 tablet 5     order for DME Equipment being ordered: Hospital Bed with side rails 1 each 0     order for DME Equipment being ordered: Power Wheel Chair 1 Device 0     order for DME Equipment being ordered: bandage tape, prefer paper 1 Package 0     order for DME Full electric hospital bed with half rails    Dx: D68800, I110, J449  Length of need: lifetime 1 Device 0     order for DME Wheel Chair Cushion: 18 x 18 inch Roho cushion 1 Device 0     order for DME Hospital bed with side rails 1 Device 0     order for DME Equipment being ordered: CPAP supplies mask, hose, filters, cushion    fax to [facility] at [phone] 10 Device prn     order for DME Equipment being ordered: CPAP supplies mask, hose, filters, cushion fax to [facility] at [phone]  Disp: 10 Device Refills: prn   Class: Local Print Start: 2/10/2017 1 Device 0     order for DME Equipment being ordered: Nebulizer and tubing supplies 1 Units 0     order for DME Equipment being ordered: Glucerna daily shakes with each meal 1 Box 11     ORDER FOR DME Equipment being ordered: Power Wheelchair 1 Device 0     ORDER FOR DME Equipment being ordered: Depends briefs 1 Month 11     pantoprazole (PROTONIX) 40 MG EC tablet Take 1 tablet (40 mg) by mouth daily 30 tablet 5     phenytoin 200 MG CAPS Take 200 mg by mouth 2 times daily 60 capsule 0     polyethylene glycol (MIRALAX/GLYCOLAX) Packet Take 17 g by mouth daily Dissolved in water or juice        pregabalin (LYRICA) 150 MG capsule Take 1 capsule (150 mg) by mouth 2 times daily 14 capsule 0     risperiDONE (RISPERDAL) 0.5 MG tablet Take 0.5 mg  by mouth At Bedtime       sennosides (SENOKOT) 8.6 MG tablet Take 1 tablet by mouth 2 times daily       solifenacin (VESICARE) 10 MG tablet Take 1 tablet (10 mg) by mouth daily 90 tablet 3     sucralfate (CARAFATE) 1 GM tablet Take 1 tablet (1 g) by mouth 4 times daily May dissolve in 10 mL water is necessary. (Start upon completion of carafate suspension) 120 tablet 11     traZODone (DESYREL) 50 MG tablet Take 150 mg by mouth At Bedtime        umeclidinium (INCRUSE ELLIPTA) 62.5 MCG/INH inhaler Inhale 1 puff into the lungs daily 1 Inhaler 6     zinc 50 MG TABS Take 1 tablet by mouth daily       Allergies   Allergen Reactions     Bee Venom      Penicillins Anaphylaxis     Other reaction(s): Skin Rash and/or Hives     Dilantin [Phenytoin] Other (See Comments)     Generic dilantin only per pt     Iodide Hives     09/11/15: Pt states she can use iodine and doesn't have any problems      Iodine-131      Novocaine [Procaine] Hives     Other reaction(s): Skin Rash and/or Hives     Tositumomab      BP Readings from Last 3 Encounters:   05/29/19 114/76   05/23/19 (!) 81/48   05/21/19 114/61    Wt Readings from Last 3 Encounters:   05/29/19 62.1 kg (137 lb)   05/23/19 62.4 kg (137 lb 8 oz)   05/14/19 62.1 kg (137 lb)           Reviewed and updated as needed this visit by Provider         Review of Systems   ROS COMP: CONSTITUTIONAL: NEGATIVE for fever, chills, change in weight  INTEGUMENTARY/SKIN: NEGATIVE for worrisome rashes, moles or lesions  ENT/MOUTH: NEGATIVE for ear, mouth and throat problems  RESP: NEGATIVE for significant cough or SOB  CV: NEGATIVE for chest pain, palpitations or peripheral edema  GI: NEGATIVE for nausea, abdominal pain, heartburn, or change in bowel habits  : NEGATIVE for frequency, dysuria, or hematuria  MUSCULOSKELETAL: NEGATIVE for significant arthralgias or myalgia  NEURO: NEGATIVE for weakness, dizziness or paresthesias  HEME: NEGATIVE for bleeding problems  PSYCHIATRIC: NEGATIVE for changes  in mood or affect      Objective    /74   Pulse 92   Temp 98.5  F (36.9  C) (Oral)   SpO2 97%   There is no height or weight on file to calculate BMI.  Physical Exam   GENERAL: alert and no distress  EYES: Eyes grossly normal to inspection, PERRL and conjunctivae and sclerae normal  HENT: ear canals and TM's normal, nose and mouth without ulcers or lesions  NECK: no adenopathy, no asymmetry, masses, or scars and thyroid normal to palpation  RESP: lungs clear to auscultation - no rales, rhonchi or wheezes  CV: regular rate and rhythm, normal S1 S2, no S3 or S4, no click or rub, no peripheral edema and peripheral pulses strong  ABDOMEN: soft, nontender, no hepatosplenomegaly, no masses and bowel sounds normal  MS: Patient is in a wheelchair with bilateral lower extremity amputations  NEURO: No focal changes  PSYCH: mentation appears normal, affect normal/bright      Lab Results   Component Value Date    A1C 5.0 04/17/2019    A1C 5.1 06/06/2018    A1C 4.4 05/02/2018    A1C 4.5 08/24/2017    A1C 4.1 01/24/2017     Creatinine   Date Value Ref Range Status   04/17/2019 0.70 0.52 - 1.04 mg/dL Final     LDL Cholesterol Calculated   Date Value Ref Range Status   08/14/2018 74 <100 mg/dL Final     Comment:     Desirable:       <100 mg/dl       Assessment & Plan     1. Diabetes mellitus type 2 without retinopathy (H)  Stable on medical therapy continuing with current meds as ordered.    2. Essential hypertension  At goal on therapy continue with medications as ordered without change    3. Chronic obstructive pulmonary disease, unspecified COPD type (H)  Stable without acute exacerbation, continue with nebulizations as needed and albuterol inhaler as scheduled  - albuterol (PROVENTIL) (2.5 MG/3ML) 0.083% neb solution; INHALE 1 VIAL VIA NEBULIZER EVERY 6 HOURS AS NEEDED  Dispense: 360 mL; Refill: 11  - VENTOLIN  (90 Base) MCG/ACT inhaler; Inhale 2 puffs into the lungs every 6 hours as needed for shortness of  "breath / dyspnea or wheezing  Dispense: 8.5 g; Refill: 11    4. S/P AKA (above knee amputation) bilateral (H)  DME given for lifted recliner for patient for home use.  This patient is a bilateral lower extremity above-the-knee amputee needs a lifting recliner in order to help mobilize from her chair to her wheelchair and also to help prevent bedsores.  She is currently wheelchair dependent.  This is not expected to improve in her lifetime and the requirement for a wheelchair and a lipid recliner will be indefinite.  The lifted recliner will also help in reducing the risk for buttock sores.    - order for DME; Equipment being ordered: lift recliner  Dispense: 1 Device; Refill: 0     Tobacco Cessation:   reports that she has been smoking cigarettes and cigars.  She has a 7.50 pack-year smoking history. She has never used smokeless tobacco.    Patient has been advised that the for highlighted medications that if she chooses to take these these are over-the-counter products and she will need to purchase on her own as her insurance will not cover.    BMI:   Estimated body mass index is 52.09 kg/m  as calculated from the following:    Height as of 5/29/19: 1.092 m (3' 7\").    Weight as of 5/29/19: 62.1 kg (137 lb).   Work on weight loss    Return in about 2 weeks (around 6/19/2019) for if symptoms recur or worsen, diabetes check 6 months.    Allan Casey MD  [facility]      "

## 2019-06-06 ENCOUNTER — TELEPHONE (OUTPATIENT)
Dept: INTERNAL MEDICINE | Facility: CLINIC | Age: 69
End: 2019-06-06

## 2019-06-06 ENCOUNTER — PATIENT OUTREACH (OUTPATIENT)
Dept: CARE COORDINATION | Facility: CLINIC | Age: 69
End: 2019-06-06

## 2019-06-06 ASSESSMENT — ACTIVITIES OF DAILY LIVING (ADL): DEPENDENT_IADLS:: CLEANING;COOKING;LAUNDRY;SHOPPING;MEAL PREPARATION;TRANSPORTATION

## 2019-06-06 NOTE — TELEPHONE ENCOUNTER
Patient states she cannot afford to purchase OTC supplements so she will need these discontinued from her medication list. Patient is also requesting orders or letter be faxed to [facility] stating for staff to check her blood sugars 3 times daily and BP once daily.

## 2019-06-06 NOTE — PROGRESS NOTES
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    Living Situation:  Pt reported that she is doing very well in her Assisted Living Facility.     Medication Management:  Pt mentioned concerns regarding taking the vitamin recommended to her due to her [facility] requiring it to be dispensed through their pharmacy. TAMARA BAUER shared that a message was left for her and encouraged her to call them back to discuss this.    Functional Status:  Pt shared that she has been going to many medical appointments. She has an upcoming procedure for her Sciatica at her pain clinic in [place]. She also works with PT everyday. TAMARA BAUER spoke to pt regarding the chair lift the she received an order for yesterday. TAMARA BAUER informed pt that often the chair of the lift is often not covered by insurance and to discuss this further with her PT that she will be giving it to. She was encouraged to also outreach to her Elderly Waiver worker to discuss coverage of this cost if needed. Pt was agreeable to this plan.      Goals        General    1. Psychosocial (pt-stated)     Notes - Note edited  6/6/2019  1:34 PM by Grazyna Padgett LGSW    Goal Statement: Within the next 1-2 months, I want to move back into my previous home at [place] in [place] for my overall wellbeing.     Measure of Success: Pt will contact [facility] and follow their instructions to apply for MA and Elderly waiver. Pt will verbally inform TAMARA BAUER of success.    Supportive Steps to Achieve: TAMARA BAUER provided [facility] Front Door number to start process. TAMARA BAUER will provide ongoing assistance and follow up to determine next steps to succeed in goal    Barriers: Potential  barriers to time in conversing with the county    Strengths: Pt is very motivated to secure services to increase independence, recognition of need for assistance, support system    Date to Achieve By: 6/18/2019    Patient expressed understanding of goal: Pt verbally stated understanding of goal    As of today's date 6/6/2019 goal is met at 76 - 100%.   Goal Status:  Complete          Patient/Caregiver understanding: Pt verbally stated understanding    Outreach Frequency: monthly  Future Appointments              In 2 weeks Guanaco Kwoalski MD [facility],  SLEEP    In 3 weeks Marely Robin MD Eye Clinic, [facility] CLIN      Plan: Pt presented no further concerns for CC SW to assist with. No further outreaches will be made at this time unless a new referral is made or a change in the pt's status occurs. Patient was provided with CC SW contact information and encouraged to call with any questions or concerns.    NAOMI Allan, [facility]  Clinic Care Coordinator  [facility] [place]  [phone]  [email or web address]

## 2019-06-06 NOTE — TELEPHONE ENCOUNTER
Reason for Call:  Other call back    Detailed comments: Patient was seen yesterday for various paperwork & requests someone call her back because she has additional questions that she forgot to ask (did not want to specify, would like to speak to someone from Dr. Casey's team).    She also needs a note for her group home staff stating that she does not need the vitamin he recommended- she was told to get it OTC but she is unable to, so she'd rather not take it. Please call to discuss.    Phone Number Patient can be reached at: Cell number on file:    Telephone Information:   Mobile [phone]       Best Time: any    Can we leave a detailed message on this number? Not Applicable    Call taken on 6/6/2019 at 10:53 AM by Laurie Killian

## 2019-06-06 NOTE — TELEPHONE ENCOUNTER
May fax this telephone order to check blood sugars 3 times daily the and blood pressure once weekly

## 2019-06-10 ENCOUNTER — TELEPHONE (OUTPATIENT)
Dept: INTERNAL MEDICINE | Facility: CLINIC | Age: 69
End: 2019-06-10

## 2019-06-10 DIAGNOSIS — F43.21 ADJUSTMENT DISORDER WITH DEPRESSED MOOD: ICD-10-CM

## 2019-06-10 RX ORDER — SUCRALFATE 1 G/1
TABLET ORAL
Qty: 120 TABLET | Refills: 11 | Status: ON HOLD | OUTPATIENT
Start: 2019-06-10 | End: 2020-06-11

## 2019-06-10 NOTE — TELEPHONE ENCOUNTER
"Requested Prescriptions   Pending Prescriptions Disp Refills     sucralfate (CARAFATE) 1 GM tablet [Pharmacy Med Name: SUCRALFATE 1G TABLET] 120 tablet 11     Sig: TAKE 1 TAB BY MOUTH FOUR TIMES DAILY. MAY DISSOLVE IN 10ML WATER ISNECESSARY       Miscellaneous Gastrointestinal Agents Passed - 6/10/2019  1:07 PM        Passed - Recent (12 mo) or future (30 days) visit within the authorizing provider's specialty     Patient had office visit in the last 12 months or has a visit in the next 30 days with authorizing provider or within the authorizing provider's specialty.  See \"Patient Info\" tab in inbasket, or \"Choose Columns\" in Meds & Orders section of the refill encounter.              Passed - Medication is active on med list        Passed - Patient is 18 years of age or older          "

## 2019-06-10 NOTE — TELEPHONE ENCOUNTER
[facility] called, [phone]    Staff requested if they can drop down previous 6/6/2019 TE orders for daily blood pressure and blood glucose checks.    Assisted Living Facility can check BP once a month and as needed based on symptoms. BG can be checked once daily since patient is only on oral diabetic medication and not insulin.    Patient overall BP and BG are stable and since facility is not set up for daily monitoring needs would then need to consider long term care needs for daily monitoring if more appropriate. [facility] states they are wanting patient to be more engaged and active in the community setting there. Triage informed staff that initial request from patient.     PCP are you ok with these changes?    Orders/ Letter will need to be re faxed to: [phone]    Valeria ZAMBRANO RN, BSN, PHN

## 2019-06-12 ENCOUNTER — PATIENT OUTREACH (OUTPATIENT)
Dept: CARE COORDINATION | Facility: CLINIC | Age: 69
End: 2019-06-12

## 2019-06-12 ASSESSMENT — ACTIVITIES OF DAILY LIVING (ADL): DEPENDENT_IADLS:: CLEANING;COOKING;LAUNDRY;SHOPPING;MEAL PREPARATION;TRANSPORTATION

## 2019-06-12 NOTE — PROGRESS NOTES
Clinic Care Coordination Contact    Clinic Care Coordination Contact  OUTREACH    Referral Information:  Referral Source: PCP    Primary Diagnosis: Psychosocial    Chief Complaint   Patient presents with     Clinic Care Coordination - Initial     Clinical Concerns:  Pt contacted CC SW because she is unhappy with her retirement. She explained that they only will check her blood pressure 1x/month and do not check her glucose for her. Pt stated that she was getting a lot more support at the TCU. She would like to return to [facility] but move into their LTC. Due to her stay being paid for by a waiver she can move at anytime.     Resources and Interventions:  Community Resources: [facility]  Supplies used at home:: Wound Care Supplies  Equipment Currently Used at Home: wheelchair, manual    Advance Care Plan/Directive  Advanced Care Plans/Directives on file:: Yes  Type Advanced Care Plans/Directives: DNR/DNI  Advanced Care Plan/Directive Status: Not Applicable    Referrals Placed: [facility], Other ([facility])     Goals:   Goals        General    Psychosocial (pt-stated)     Notes - Note created  6/12/2019 10:25 AM by Grazyna Padgett LGSW    Goal Statement: Within the next month, I want to move back into my previous home at [facility] for my overall wellbeing.      Measure of Success: Sonya will verbally inform CC SW of success.     Supportive Steps to Achieve: CC SW will provide ongoing assistance and follow up to determine next steps to succeed in goal     Barriers: Openings at Assisted Living Facility     Strengths: Sonya is very motivated to increase overall services that are included at [facility]     Date to Achieve By: 7/12/2019     Patient expressed understanding of goal: Sonya verbally stated understanding of goal        Patient/Caregiver understanding: Pt verbally stated understanding    Outreach Frequency: 2 weeks  Future Appointments              Tomorrow [facility], OX    In 5 days [facility], [place], [facility], [place] O    In 2 weeks Marely Robin MD Eye Clinic, [facility] CLIN    In 3 weeks Guanaco Kowalski MD [facility],  SLEEP      Plan: CC SW will contact [facility] to inquire about this and contact pt with information. Pt was reminded of CC SW contact information and encouraged to call with questions or concerns that arise before next outreach.    NAOMI Allan, [facility]  Clinic Care Coordinator  [facility] [place]  [phone]  [email or web address]

## 2019-06-12 NOTE — PROGRESS NOTES
Clinic Care Coordination Contact  Care Team Conversations    TAMARA BAUER left a message for Mekhi Whitney with [facility] requesting a return call.    Plan: TAMARA BAUER will continue to follow up with [facility].    NAOMI Allan, [facility]  Clinic Care Coordinator  [facility] [place]  [phone]  [email or web address]

## 2019-06-13 ENCOUNTER — PATIENT OUTREACH (OUTPATIENT)
Dept: CARE COORDINATION | Facility: CLINIC | Age: 69
End: 2019-06-13

## 2019-06-13 DIAGNOSIS — E11.9 DIABETES MELLITUS TYPE 2 WITHOUT RETINOPATHY (H): ICD-10-CM

## 2019-06-13 LAB
ALBUMIN SERPL-MCNC: 3.3 G/DL (ref 3.4–5)
ALP SERPL-CCNC: 192 U/L (ref 40–150)
ALT SERPL W P-5'-P-CCNC: 52 U/L (ref 0–50)
AST SERPL W P-5'-P-CCNC: 38 U/L (ref 0–45)
BILIRUB DIRECT SERPL-MCNC: <0.1 MG/DL (ref 0–0.2)
BILIRUB SERPL-MCNC: 0.1 MG/DL (ref 0.2–1.3)
PROT SERPL-MCNC: 8.5 G/DL (ref 6.8–8.8)

## 2019-06-13 PROCEDURE — 80076 HEPATIC FUNCTION PANEL: CPT | Performed by: INTERNAL MEDICINE

## 2019-06-13 PROCEDURE — 36415 COLL VENOUS BLD VENIPUNCTURE: CPT | Performed by: INTERNAL MEDICINE

## 2019-06-13 ASSESSMENT — ACTIVITIES OF DAILY LIVING (ADL): DEPENDENT_IADLS:: CLEANING;COOKING;LAUNDRY;SHOPPING;MEAL PREPARATION;TRANSPORTATION

## 2019-06-13 NOTE — PROGRESS NOTES
Clinic Care Coordination Contact  Care Team Conversations    TAMARA BAUER spoke with [facility] regarding nursing home admission. Staff explained that openings are very rare and there are 16 people currently on the list. She explained that it is not an option for someone looking for admittance in the next few months.    Plan: TAMARA BAUER with inform pt of outcome.    NAOMI Allan, [facility]  Clinic Care Coordinator  [facility] for [facility] [place]  [phone]  [email or web address]

## 2019-06-13 NOTE — TELEPHONE ENCOUNTER
[facility] calling stating they need a written/signed MD order for discontinuation of OTC supplements. Please remove from current medication list and sign as well. Order should be faxed again to [phone] ATTN: Leslie

## 2019-06-13 NOTE — PROGRESS NOTES
Clinic Care Coordination Contact  [facility]/Voicemail    Referral Source: PCP  Clinical Data: Care Coordinator Outreach    Outreach attempted x 2.  Left message on voicemail for Mekhi Whitney with [facility] admissions with call back information and requested return call.    Plan: Care Coordinator will continue to attempt to reach admissions staff at [facility].    NAOMI Allan, [facility]  Clinic Care Coordinator  [facility] [place]  [phone]  [email or web address]

## 2019-06-13 NOTE — PROGRESS NOTES
Clinic Care Coordination Contact    Clinic Care Coordination Contact  OUTREACH    CC SW relayed to pt that [facility] was not accepting new residents, she was understanding.     Goals:   Goals        General    Psychosocial (pt-stated)     Notes - Note created  6/12/2019 10:25 AM by Grazyna Padgett LGSW    Goal Statement: Within the next month, I want to move back into my previous home at [facility] for my overall wellbeing.      Measure of Success: Sonya will verbally inform CC SW of success.     Supportive Steps to Achieve: CC SW will provide ongoing assistance and follow up to determine next steps to succeed in goal     Barriers: Openings at Assisted Living Facility     Strengths: Sonya is very motivated to increase overall services that are included at [facility]     Date to Achieve By: 7/12/2019     Patient expressed understanding of goal: Sonya verbally stated understanding of goal        Outreach Frequency: 2 weeks  Future Appointments              In 4 days [facility], [place], [facility], [place] O    In 2 weeks Marely Robin MD Eye Clinic, [facility] CLIN    In 3 weeks Guanaco Kowalski MD [facility],  SLEEP      Plan: CC SW will follow up in when more information comes from [facility]. Pt was reminded of CC SW contact information and encouraged to call with questions or concerns that arise before next outreach.    NAOMI Allan, SHIRIN  Clinic Care Coordinator  [facility] [place]  [phone]  [email or web address]

## 2019-06-13 NOTE — PROGRESS NOTES
Clinic Care Coordination Contact    Clinic Care Coordination Contact  OUTREACH    Referral Information:  Referral Source: PCP    Primary Diagnosis: Psychosocial    Chief Complaint   Patient presents with     Clinic Care Coordination - Follow-up     Clinical Concerns:  Pt presented to clinic to have her labs drawn. She shared with CC JUANCARLOS that she continues to be interested in living in a Nursing Home, rather than assisted living. She is familiar with [facility] and [facility] and would like CC JUANCARLOS to assist in exploring those places. Pt also, shared that she is working hard to get her medical needs and care in order. She is doing this by attending needed appointments, getting a CPAP, ordering a chair lift, and wanting to monitor her blood pressure and glucose more closely. Pt also has upcoming appointments to look at some issues she is having with her back.    Goals        General    Psychosocial (pt-stated)     Notes - Note created  6/12/2019 10:25 AM by Grazyna Padgett LGSW    Goal Statement: Within the next month, I want to move back into my previous home at [facility] for my overall wellbeing.      Measure of Success: Sonya will verbally inform TAMARA BAUER of success.     Supportive Steps to Achieve: CC JUANCARLOS will provide ongoing assistance and follow up to determine next steps to succeed in goal     Barriers: Openings at Assisted Living Facility     Strengths: Sonya is very motivated to increase overall services that are included at [facility]     Date to Achieve By: 7/12/2019     Patient expressed understanding of goal: Sonya verbally stated understanding of goal        Patient/Caregiver understanding: Pt verbally stated understanding    Outreach Frequency: 2 weeks  Future Appointments              In 4 days [facility], [place], [facility], [place]    In 2 weeks Marely Robin MD Eye Clinic, [facility] CLIN    In 3 weeks Guanaco Kowalski MD [facility],  SLEEP      Plan: CC JUANCARLOS will out reach  to Nursing Home admissions and update pt with findings. Pt was reminded of CC SW contact information and encouraged to call with questions or concerns that arise before next outreach.    NAOMI Allan, [facility]  Clinic Care Coordinator  [facility] [place]  [phone]  [email or web address]

## 2019-06-14 ENCOUNTER — NURSE TRIAGE (OUTPATIENT)
Dept: INTERNAL MEDICINE | Facility: CLINIC | Age: 69
End: 2019-06-14

## 2019-06-14 ENCOUNTER — HOSPITAL ENCOUNTER (OUTPATIENT)
Facility: CLINIC | Age: 69
Setting detail: OBSERVATION
Discharge: NURSING, REHAB OR LONG-TERM CARE | End: 2019-06-16
Attending: EMERGENCY MEDICINE | Admitting: EMERGENCY MEDICINE
Payer: COMMERCIAL

## 2019-06-14 DIAGNOSIS — R11.2 NAUSEA AND VOMITING, INTRACTABILITY OF VOMITING NOT SPECIFIED, UNSPECIFIED VOMITING TYPE: ICD-10-CM

## 2019-06-14 DIAGNOSIS — K92.1 BLOOD IN STOOL: ICD-10-CM

## 2019-06-14 DIAGNOSIS — R10.10 UPPER ABDOMINAL PAIN: ICD-10-CM

## 2019-06-14 DIAGNOSIS — K92.2 GI BLEED: ICD-10-CM

## 2019-06-14 LAB
ALBUMIN SERPL-MCNC: 3.3 G/DL (ref 3.4–5)
ALBUMIN UR-MCNC: 10 MG/DL
ALP SERPL-CCNC: 173 U/L (ref 40–150)
ALT SERPL W P-5'-P-CCNC: 48 U/L (ref 0–50)
ANION GAP SERPL CALCULATED.3IONS-SCNC: 9 MMOL/L (ref 3–14)
APPEARANCE UR: CLEAR
AST SERPL W P-5'-P-CCNC: 31 U/L (ref 0–45)
BACTERIA #/AREA URNS HPF: ABNORMAL /HPF
BASOPHILS # BLD AUTO: 0.1 10E9/L (ref 0–0.2)
BASOPHILS NFR BLD AUTO: 0.5 %
BILIRUB SERPL-MCNC: 0.2 MG/DL (ref 0.2–1.3)
BILIRUB UR QL STRIP: NEGATIVE
BUN SERPL-MCNC: 14 MG/DL (ref 7–30)
CALCIUM SERPL-MCNC: 9.5 MG/DL (ref 8.5–10.1)
CHLORIDE SERPL-SCNC: 104 MMOL/L (ref 94–109)
CO2 SERPL-SCNC: 26 MMOL/L (ref 20–32)
COLOR UR AUTO: YELLOW
CREAT SERPL-MCNC: 0.61 MG/DL (ref 0.52–1.04)
DIFFERENTIAL METHOD BLD: ABNORMAL
EOSINOPHIL # BLD AUTO: 0.1 10E9/L (ref 0–0.7)
EOSINOPHIL NFR BLD AUTO: 0.5 %
ERYTHROCYTE [DISTWIDTH] IN BLOOD BY AUTOMATED COUNT: 16.5 % (ref 10–15)
GFR SERPL CREATININE-BSD FRML MDRD: >90 ML/MIN/{1.73_M2}
GLUCOSE BLDC GLUCOMTR-MCNC: 86 MG/DL (ref 70–99)
GLUCOSE BLDC GLUCOMTR-MCNC: 91 MG/DL (ref 70–99)
GLUCOSE SERPL-MCNC: 85 MG/DL (ref 70–99)
GLUCOSE UR STRIP-MCNC: NEGATIVE MG/DL
HCT VFR BLD AUTO: 36.7 % (ref 35–47)
HGB BLD-MCNC: 11.6 G/DL (ref 11.7–15.7)
HGB BLD-MCNC: 11.8 G/DL (ref 11.7–15.7)
HGB UR QL STRIP: ABNORMAL
IMM GRANULOCYTES # BLD: 0.1 10E9/L (ref 0–0.4)
IMM GRANULOCYTES NFR BLD: 0.5 %
INR PPP: 1.1 (ref 0.86–1.14)
KETONES UR STRIP-MCNC: NEGATIVE MG/DL
LEUKOCYTE ESTERASE UR QL STRIP: ABNORMAL
LYMPHOCYTES # BLD AUTO: 2.3 10E9/L (ref 0.8–5.3)
LYMPHOCYTES NFR BLD AUTO: 15.1 %
MCH RBC QN AUTO: 28.7 PG (ref 26.5–33)
MCHC RBC AUTO-ENTMCNC: 31.6 G/DL (ref 31.5–36.5)
MCV RBC AUTO: 91 FL (ref 78–100)
MONOCYTES # BLD AUTO: 1.4 10E9/L (ref 0–1.3)
MONOCYTES NFR BLD AUTO: 9.4 %
NEUTROPHILS # BLD AUTO: 11.2 10E9/L (ref 1.6–8.3)
NEUTROPHILS NFR BLD AUTO: 74 %
NITRATE UR QL: NEGATIVE
NRBC # BLD AUTO: 0 10*3/UL
NRBC BLD AUTO-RTO: 0 /100
PH UR STRIP: 6 PH (ref 5–7)
PLATELET # BLD AUTO: 411 10E9/L (ref 150–450)
POTASSIUM SERPL-SCNC: 3.5 MMOL/L (ref 3.4–5.3)
PROT SERPL-MCNC: 8.2 G/DL (ref 6.8–8.8)
RBC # BLD AUTO: 4.04 10E12/L (ref 3.8–5.2)
RBC #/AREA URNS AUTO: 5 /HPF (ref 0–2)
SODIUM SERPL-SCNC: 140 MMOL/L (ref 133–144)
SOURCE: ABNORMAL
SP GR UR STRIP: 1.02 (ref 1–1.03)
UROBILINOGEN UR STRIP-MCNC: NORMAL MG/DL (ref 0–2)
WBC # BLD AUTO: 15.1 10E9/L (ref 4–11)
WBC #/AREA URNS AUTO: 4 /HPF (ref 0–5)

## 2019-06-14 PROCEDURE — 36415 COLL VENOUS BLD VENIPUNCTURE: CPT | Performed by: NURSE PRACTITIONER

## 2019-06-14 PROCEDURE — G0378 HOSPITAL OBSERVATION PER HR: HCPCS

## 2019-06-14 PROCEDURE — 40000141 ZZH STATISTIC PERIPHERAL IV START W/O US GUIDANCE

## 2019-06-14 PROCEDURE — 00000146 ZZHCL STATISTIC GLUCOSE BY METER IP

## 2019-06-14 PROCEDURE — 99220 ZZC INITIAL OBSERVATION CARE,LEVL III: CPT | Mod: Z6 | Performed by: NURSE PRACTITIONER

## 2019-06-14 PROCEDURE — 87088 URINE BACTERIA CULTURE: CPT | Performed by: EMERGENCY MEDICINE

## 2019-06-14 PROCEDURE — 25000128 H RX IP 250 OP 636: Performed by: NURSE PRACTITIONER

## 2019-06-14 PROCEDURE — C9113 INJ PANTOPRAZOLE SODIUM, VIA: HCPCS | Performed by: NURSE PRACTITIONER

## 2019-06-14 PROCEDURE — 25800030 ZZH RX IP 258 OP 636: Performed by: NURSE PRACTITIONER

## 2019-06-14 PROCEDURE — 25000128 H RX IP 250 OP 636: Performed by: EMERGENCY MEDICINE

## 2019-06-14 PROCEDURE — 81001 URINALYSIS AUTO W/SCOPE: CPT | Performed by: EMERGENCY MEDICINE

## 2019-06-14 PROCEDURE — 25000132 ZZH RX MED GY IP 250 OP 250 PS 637: Performed by: NURSE PRACTITIONER

## 2019-06-14 PROCEDURE — 80053 COMPREHEN METABOLIC PANEL: CPT | Performed by: EMERGENCY MEDICINE

## 2019-06-14 PROCEDURE — 85018 HEMOGLOBIN: CPT | Performed by: NURSE PRACTITIONER

## 2019-06-14 PROCEDURE — 85025 COMPLETE CBC W/AUTO DIFF WBC: CPT | Performed by: EMERGENCY MEDICINE

## 2019-06-14 PROCEDURE — 87086 URINE CULTURE/COLONY COUNT: CPT | Performed by: EMERGENCY MEDICINE

## 2019-06-14 PROCEDURE — 87186 SC STD MICRODIL/AGAR DIL: CPT | Performed by: EMERGENCY MEDICINE

## 2019-06-14 PROCEDURE — 96375 TX/PRO/DX INJ NEW DRUG ADDON: CPT

## 2019-06-14 PROCEDURE — 96374 THER/PROPH/DIAG INJ IV PUSH: CPT

## 2019-06-14 PROCEDURE — 99285 EMERGENCY DEPT VISIT HI MDM: CPT | Mod: 25

## 2019-06-14 PROCEDURE — 85610 PROTHROMBIN TIME: CPT | Performed by: EMERGENCY MEDICINE

## 2019-06-14 RX ORDER — POLYETHYLENE GLYCOL 3350 17 G/17G
17 POWDER, FOR SOLUTION ORAL DAILY PRN
Status: DISCONTINUED | OUTPATIENT
Start: 2019-06-14 | End: 2019-06-16 | Stop reason: HOSPADM

## 2019-06-14 RX ORDER — ACETAMINOPHEN 650 MG/1
650 SUPPOSITORY RECTAL EVERY 4 HOURS PRN
Status: DISCONTINUED | OUTPATIENT
Start: 2019-06-14 | End: 2019-06-16 | Stop reason: HOSPADM

## 2019-06-14 RX ORDER — ACETAMINOPHEN 325 MG/1
650 TABLET ORAL EVERY 4 HOURS PRN
Status: DISCONTINUED | OUTPATIENT
Start: 2019-06-14 | End: 2019-06-16 | Stop reason: HOSPADM

## 2019-06-14 RX ORDER — ONDANSETRON 2 MG/ML
4 INJECTION INTRAMUSCULAR; INTRAVENOUS EVERY 6 HOURS PRN
Status: DISCONTINUED | OUTPATIENT
Start: 2019-06-14 | End: 2019-06-16 | Stop reason: HOSPADM

## 2019-06-14 RX ORDER — PREGABALIN 75 MG/1
150 CAPSULE ORAL 3 TIMES DAILY
Status: ON HOLD | COMMUNITY
End: 2020-06-11

## 2019-06-14 RX ORDER — ATORVASTATIN CALCIUM 40 MG/1
40 TABLET, FILM COATED ORAL DAILY
Status: DISCONTINUED | OUTPATIENT
Start: 2019-06-15 | End: 2019-06-16 | Stop reason: HOSPADM

## 2019-06-14 RX ORDER — IPRATROPIUM BROMIDE AND ALBUTEROL SULFATE 2.5; .5 MG/3ML; MG/3ML
3 SOLUTION RESPIRATORY (INHALATION)
Status: DISCONTINUED | OUTPATIENT
Start: 2019-06-14 | End: 2019-06-16 | Stop reason: HOSPADM

## 2019-06-14 RX ORDER — METOPROLOL SUCCINATE 50 MG/1
50 TABLET, EXTENDED RELEASE ORAL DAILY
Status: DISCONTINUED | OUTPATIENT
Start: 2019-06-15 | End: 2019-06-16 | Stop reason: HOSPADM

## 2019-06-14 RX ORDER — NICOTINE POLACRILEX 4 MG
15-30 LOZENGE BUCCAL
Status: DISCONTINUED | OUTPATIENT
Start: 2019-06-14 | End: 2019-06-16 | Stop reason: HOSPADM

## 2019-06-14 RX ORDER — NICOTINE 21 MG/24HR
1 PATCH, TRANSDERMAL 24 HOURS TRANSDERMAL
Status: DISCONTINUED | OUTPATIENT
Start: 2019-06-14 | End: 2019-06-16 | Stop reason: HOSPADM

## 2019-06-14 RX ORDER — ONDANSETRON 4 MG/1
4 TABLET, ORALLY DISINTEGRATING ORAL EVERY 6 HOURS PRN
Status: DISCONTINUED | OUTPATIENT
Start: 2019-06-14 | End: 2019-06-16 | Stop reason: HOSPADM

## 2019-06-14 RX ORDER — ASPIRIN 81 MG/1
81 TABLET ORAL DAILY
Status: DISCONTINUED | OUTPATIENT
Start: 2019-06-15 | End: 2019-06-15

## 2019-06-14 RX ORDER — TOLTERODINE 4 MG/1
4 CAPSULE, EXTENDED RELEASE ORAL DAILY
Status: DISCONTINUED | OUTPATIENT
Start: 2019-06-15 | End: 2019-06-16 | Stop reason: HOSPADM

## 2019-06-14 RX ORDER — PANTOPRAZOLE SODIUM 40 MG/1
40 TABLET, DELAYED RELEASE ORAL
Status: DISCONTINUED | OUTPATIENT
Start: 2019-06-15 | End: 2019-06-14

## 2019-06-14 RX ORDER — HYDROMORPHONE HYDROCHLORIDE 1 MG/ML
0.5 INJECTION, SOLUTION INTRAMUSCULAR; INTRAVENOUS; SUBCUTANEOUS ONCE
Status: DISCONTINUED | OUTPATIENT
Start: 2019-06-14 | End: 2019-06-14

## 2019-06-14 RX ORDER — ONDANSETRON 2 MG/ML
4 INJECTION INTRAMUSCULAR; INTRAVENOUS ONCE
Status: COMPLETED | OUTPATIENT
Start: 2019-06-14 | End: 2019-06-14

## 2019-06-14 RX ORDER — LIDOCAINE 40 MG/G
CREAM TOPICAL
Status: DISCONTINUED | OUTPATIENT
Start: 2019-06-14 | End: 2019-06-16 | Stop reason: HOSPADM

## 2019-06-14 RX ORDER — NALOXONE HYDROCHLORIDE 0.4 MG/ML
.1-.4 INJECTION, SOLUTION INTRAMUSCULAR; INTRAVENOUS; SUBCUTANEOUS
Status: DISCONTINUED | OUTPATIENT
Start: 2019-06-14 | End: 2019-06-16 | Stop reason: HOSPADM

## 2019-06-14 RX ORDER — ONDANSETRON 2 MG/ML
4 INJECTION INTRAMUSCULAR; INTRAVENOUS ONCE
Status: DISCONTINUED | OUTPATIENT
Start: 2019-06-14 | End: 2019-06-14

## 2019-06-14 RX ORDER — PHENYTOIN SODIUM 100 MG/1
200 CAPSULE, EXTENDED RELEASE ORAL 2 TIMES DAILY
Status: DISCONTINUED | OUTPATIENT
Start: 2019-06-14 | End: 2019-06-16 | Stop reason: HOSPADM

## 2019-06-14 RX ORDER — SODIUM CHLORIDE 9 MG/ML
INJECTION, SOLUTION INTRAVENOUS CONTINUOUS
Status: DISCONTINUED | OUTPATIENT
Start: 2019-06-14 | End: 2019-06-15

## 2019-06-14 RX ORDER — HYDROMORPHONE HYDROCHLORIDE 1 MG/ML
0.5 INJECTION, SOLUTION INTRAMUSCULAR; INTRAVENOUS; SUBCUTANEOUS ONCE
Status: COMPLETED | OUTPATIENT
Start: 2019-06-14 | End: 2019-06-14

## 2019-06-14 RX ORDER — ESCITALOPRAM OXALATE 10 MG/1
10 TABLET ORAL AT BEDTIME
Status: DISCONTINUED | OUTPATIENT
Start: 2019-06-14 | End: 2019-06-16 | Stop reason: HOSPADM

## 2019-06-14 RX ORDER — SENNOSIDES 8.6 MG
1 TABLET ORAL DAILY
Status: DISCONTINUED | OUTPATIENT
Start: 2019-06-15 | End: 2019-06-15

## 2019-06-14 RX ORDER — PHENYTOIN SODIUM 100 MG/1
100 CAPSULE, EXTENDED RELEASE ORAL 2 TIMES DAILY
Status: DISCONTINUED | OUTPATIENT
Start: 2019-06-14 | End: 2019-06-14

## 2019-06-14 RX ORDER — RISPERIDONE 0.5 MG/1
0.5 TABLET ORAL AT BEDTIME
Status: DISCONTINUED | OUTPATIENT
Start: 2019-06-14 | End: 2019-06-16 | Stop reason: HOSPADM

## 2019-06-14 RX ORDER — LEVETIRACETAM 500 MG/1
500 TABLET ORAL 2 TIMES DAILY
Status: DISCONTINUED | OUTPATIENT
Start: 2019-06-14 | End: 2019-06-16 | Stop reason: HOSPADM

## 2019-06-14 RX ORDER — LISINOPRIL 20 MG/1
20 TABLET ORAL DAILY
Status: DISCONTINUED | OUTPATIENT
Start: 2019-06-15 | End: 2019-06-16 | Stop reason: HOSPADM

## 2019-06-14 RX ORDER — PREGABALIN 75 MG/1
150 CAPSULE ORAL 3 TIMES DAILY
Status: DISCONTINUED | OUTPATIENT
Start: 2019-06-14 | End: 2019-06-16 | Stop reason: HOSPADM

## 2019-06-14 RX ORDER — DEXTROSE MONOHYDRATE 25 G/50ML
25-50 INJECTION, SOLUTION INTRAVENOUS
Status: DISCONTINUED | OUTPATIENT
Start: 2019-06-14 | End: 2019-06-16 | Stop reason: HOSPADM

## 2019-06-14 RX ADMIN — TRAZODONE HYDROCHLORIDE 150 MG: 50 TABLET ORAL at 22:24

## 2019-06-14 RX ADMIN — LEVETIRACETAM 500 MG: 500 TABLET ORAL at 22:24

## 2019-06-14 RX ADMIN — SODIUM CHLORIDE: 9 INJECTION, SOLUTION INTRAVENOUS at 21:09

## 2019-06-14 RX ADMIN — ONDANSETRON 4 MG: 2 INJECTION INTRAMUSCULAR; INTRAVENOUS at 19:31

## 2019-06-14 RX ADMIN — PANTOPRAZOLE SODIUM 40 MG: 40 INJECTION, POWDER, FOR SOLUTION INTRAVENOUS at 22:22

## 2019-06-14 RX ADMIN — PREGABALIN 150 MG: 75 CAPSULE ORAL at 22:23

## 2019-06-14 RX ADMIN — HYDROMORPHONE HYDROCHLORIDE 0.5 MG: 1 INJECTION, SOLUTION INTRAMUSCULAR; INTRAVENOUS; SUBCUTANEOUS at 19:34

## 2019-06-14 RX ADMIN — ESCITALOPRAM OXALATE 10 MG: 10 TABLET ORAL at 23:55

## 2019-06-14 RX ADMIN — ACETAMINOPHEN 650 MG: 325 TABLET, FILM COATED ORAL at 21:26

## 2019-06-14 RX ADMIN — NICOTINE 1 PATCH: 14 PATCH, EXTENDED RELEASE TRANSDERMAL at 22:23

## 2019-06-14 RX ADMIN — PHENYTOIN SODIUM 200 MG: 100 CAPSULE ORAL at 23:55

## 2019-06-14 RX ADMIN — RISPERIDONE 0.5 MG: 0.5 TABLET ORAL at 23:55

## 2019-06-14 ASSESSMENT — MIFFLIN-ST. JEOR
SCORE: 1126.89
SCORE: 1078.81

## 2019-06-14 ASSESSMENT — ENCOUNTER SYMPTOMS
ABDOMINAL PAIN: 1
BLOOD IN STOOL: 1
CHILLS: 1
DYSURIA: 1
FEVER: 0
VOMITING: 1
DIARRHEA: 0

## 2019-06-14 NOTE — TELEPHONE ENCOUNTER
"    Additional Information    Negative: Vomiting    Unable to urinate (or only a few drops) and bladder feels very full     Patient reports also feeling clammy and very little urine coming from super pubic catheter    Patient sounds very sick or weak to the triager    Negative: Shock suspected (e.g., cold/pale/clammy skin, too weak to stand, low BP, rapid pulse)    Negative: Sounds like a life-threatening emergency to the triager    Answer Assessment - Initial Assessment Questions  1. SEVERITY: \"How bad is the pain?\"  (e.g., Scale 1-10; mild, moderate, or severe)    - MILD (1-3): complains slightly about urination hurting    - MODERATE (4-7): interferes with normal activities      - SEVERE (8-10): excruciating, unwilling or unable to urinate because of the pain       moderate  2. FREQUENCY: \"How many times have you had painful urination today?\"       Very little urine coming from pubic catheter  3. PATTERN: \"Is pain present every time you urinate or just sometimes?\"       yes  4. ONSET: \"When did the painful urination start?\"       Couple times a day  5. FEVER: \"Do you have a fever?\" If so, ask: \"What is your temperature, how was it measured, and when did it start?\"      Chills off and on  6. PAST UTI: \"Have you had a urine infection before?\" If so, ask: \"When was the last time?\" and \"What happened that time?\"       Yes, hx of going sepsis. Last treatment was about 4 months  7. CAUSE: \"What do you think is causing the painful urination?\"  (e.g., UTI, scratch, Herpes sore)      UTI  8. OTHER SYMPTOMS: \"Do you have any other symptoms?\" (e.g., flank pain, vaginal discharge, genital sores, urgency, blood in urine)      Feel nauseated, dark colored urine  9. PREGNANCY: \"Is there any chance you are pregnant?\" \"When was your last menstrual period?\"      no    Protocols used: URINATION PAIN - FEMALE-A-OH      "

## 2019-06-14 NOTE — TELEPHONE ENCOUNTER
Patient intially calling to request medication without an OV. Recommended ER for immediate eval d/t possible urinary retention.    Valeria ZAMBRANO RN, BSN, PHN

## 2019-06-14 NOTE — ED PROVIDER NOTES
[place] EMERGENCY DEPARTMENT ([facility])  June 14, 2019    History     Chief Complaint   Patient presents with     Abdominal Pain     HPI  Sonya Foote is a 69 year old female with history notable for COPD, diastolic CHF, DM 2, HTN, HLD, GERD, and right BKA who presents to the ED for multiple complaints.  Patient states that she has upper abdominal pain and a couple of days of black tarry stools.  She reports that she has previous history of hemorrhoids.  She also complains of vomiting, dysuria, and noted some malodorous urine.  She also reports that she has some subjective fevers and chills last night.  She otherwise denies taking anticoagulants currently besides baby aspirin, hematemesis, or diarrhea.    PAST MEDICAL HISTORY  Past Medical History:   Diagnosis Date     Anemia      Antiplatelet or antithrombotic long-term use      Arthritis      AS (sickle cell trait) (H) 10/8/2013     Blind left eye      BPPV (benign paroxysmal positional vertigo)      Chronic back pain      COPD (chronic obstructive pulmonary disease) (H)      Coronary artery disease      CVA (cerebral infarction) 10/8/2012    x3, residual left sided weakness     Diastolic CHF (H) 9/4/2012     Dilantin toxicity 5/31/2013     Esophageal candidiasis (H)      GERD (gastroesophageal reflux disease)      Heart attack (H)      Hernia, abdominal      History of blood transfusion     x5     History of thrombophlebitis      HTN (hypertension) 9/4/2012     Hyperlipidemia LDL goal <100 9/4/2012     Legally blind in right eye, as defined in [place]     No periphal vision right eye     Osteoporosis 1/21/2013     Other chronic pain      PAD (peripheral artery disease) (H)      Palpitations      Person who has had sex change operation 1/20/2014     Pneumonia      Schizoaffective disorder (H)      Seizure disorder (H) 9/4/2012     Sleep apnea     CPAP     Thrombosis of leg      Type 2 diabetes, HbA1C goal < 8% (H) 9/4/2012     Uncomplicated asthma       Unspecified cerebral artery occlusion with cerebral infarction      PAST SURGICAL HISTORY  Past Surgical History:   Procedure Laterality Date     AMPUTATE LEG ABOVE KNEE Left 6/11/2016    Procedure: AMPUTATE LEG ABOVE KNEE;  Surgeon: Mello Rodriguez MD;  [facility]     AMPUTATE LEG BELOW KNEE Right 11/7/2016    Procedure: AMPUTATE LEG BELOW KNEE;  Surgeon: Savannah Durant MD;  [facility]     AMPUTATE REVISION STUMP LOWER EXTREMITY Right 11/11/2016    Procedure: AMPUTATE REVISION STUMP LOWER EXTREMITY;  Surgeon: Savannah Durant MD;  [facility]     AMPUTATE REVISION STUMP LOWER EXTREMITY Right 11/16/2016    Procedure: AMPUTATE REVISION STUMP LOWER EXTREMITY;  Surgeon: Savannah Durant MD;  [facility]     AMPUTATE TOE(S) Right 1/5/2016    Procedure: AMPUTATE TOE(S);  Surgeon: Mello Gaines DPM;  Location: [facility]     ANGIOGRAM Bilateral 11/21/2014    Procedure: ANGIOGRAM;  Surgeon: Savannah Durant MD;  [facility]     ANGIOGRAM Left 1/16/2015    Procedure: ANGIOGRAM;  Surgeon: Savannah Durant MD;  [facility]     ANGIOGRAM Bilateral 9/14/2015    Procedure: ANGIOGRAM;  Surgeon: Savannah Durant MD;  [facility]     ANGIOGRAM Left 10/12/2015    Procedure: ANGIOGRAM;  Surgeon: Savannah Durant MD;  [facility]     ANGIOGRAM Right 6/6/2016    Procedure: ANGIOGRAM;  Surgeon: Savnanah Durant MD;  [facility]     ANGIOPLASTY Right 6/6/2016    Procedure: ANGIOPLASTY;  Surgeon: Savannah Durant MD;  [facility]     APPENDECTOMY       BREAST SURGERY      right breast bx (benign)     CATARACT IOL, RT/LT Right      CHOLECYSTECTOMY       COLONOSCOPY N/A 8/25/2014    Procedure: COLONOSCOPY;  Surgeon: Mello Ferrer MD;  [facility]     COLONOSCOPY WITH CO2 INSUFFLATION N/A 8/20/2014    Procedure: COLONOSCOPY WITH CO2 INSUFFLATION;  Surgeon: Duane, William Charles, MD;  Location: [facility]     CYSTOSTOMY, INSERT TUBE SUPRAPUBIC, COMBINED N/A 1/16/2018    Procedure: COMBINED CYSTOSTOMY, INSERT TUBE  SUPRAPUBIC;  Cystoscopy, Intraoperative Ultrasound, Suprapubic Tube Placement;  Surgeon: Keanu Dawson MD;  [facility]     ENDARTERECTOMY FEMORAL  5/23/2014    Procedure: ENDARTERECTOMY FEMORAL;  Surgeon: Jason Joshi MD;  [facility]     ESOPHAGOSCOPY, GASTROSCOPY, DUODENOSCOPY (EGD), COMBINED  12/14/2012    Procedure: COMBINED ESOPHAGOSCOPY, GASTROSCOPY, DUODENOSCOPY (EGD), BIOPSY SINGLE OR MULTIPLE;  ESOPHAGOSCOPY, GASTROSCOPY, DUODENOSCOPY (EGD), DILATATION ;  Surgeon: Elizabeth Stevenson MD;  Location:  GI     ESOPHAGOSCOPY, GASTROSCOPY, DUODENOSCOPY (EGD), COMBINED  12/31/2013    Procedure: COMBINED ESOPHAGOSCOPY, GASTROSCOPY, DUODENOSCOPY (EGD), BIOPSY SINGLE OR MULTIPLE;;  Surgeon: Clemente Lopez MD;  [facility]     ESOPHAGOSCOPY, GASTROSCOPY, DUODENOSCOPY (EGD), COMBINED  4/1/2014    Procedure: COMBINED ESOPHAGOSCOPY, GASTROSCOPY, DUODENOSCOPY (EGD);;  Surgeon: Clemente Lopez MD;  Location:  GI     ESOPHAGOSCOPY, GASTROSCOPY, DUODENOSCOPY (EGD), COMBINED  6/28/2014    Procedure: COMBINED ESOPHAGOSCOPY, GASTROSCOPY, DUODENOSCOPY (EGD);  Surgeon: Clemente Lopez MD;  Location:  GI     ESOPHAGOSCOPY, GASTROSCOPY, DUODENOSCOPY (EGD), COMBINED N/A 8/20/2014    Procedure: COMBINED ESOPHAGOSCOPY, GASTROSCOPY, DUODENOSCOPY (EGD), BIOPSY SINGLE OR MULTIPLE;  Surgeon: Duane, William Charles, MD;  Location:  OR     ESOPHAGOSCOPY, GASTROSCOPY, DUODENOSCOPY (EGD), COMBINED N/A 8/22/2014    Procedure: COMBINED ESOPHAGOSCOPY, GASTROSCOPY, DUODENOSCOPY (EGD), BIOPSY SINGLE OR MULTIPLE;  Surgeon: Mello Ferrer MD;  [facility]     ESOPHAGOSCOPY, GASTROSCOPY, DUODENOSCOPY (EGD), COMBINED N/A 10/2/2014    Procedure: COMBINED ESOPHAGOSCOPY, GASTROSCOPY, DUODENOSCOPY (EGD), BIOPSY SINGLE OR MULTIPLE;  Surgeon: Remy Haskins MD;  Location:  GI     ESOPHAGOSCOPY, GASTROSCOPY, DUODENOSCOPY (EGD), COMBINED Left 12/15/2014    Procedure: COMBINED ESOPHAGOSCOPY, GASTROSCOPY,  DUODENOSCOPY (EGD), BIOPSY SINGLE OR MULTIPLE;  Surgeon: Remy Haskins MD;  [facility]     ESOPHAGOSCOPY, GASTROSCOPY, DUODENOSCOPY (EGD), COMBINED N/A 2/25/2015    Procedure: COMBINED ENDOSCOPIC ULTRASOUND, ESOPHAGOSCOPY, GASTROSCOPY, DUODENOSCOPY (EGD), FINE NEEDLE ASPIRATE/BIOPSY;  Surgeon: Clemente Lugo MD;  [facility]     ESOPHAGOSCOPY, GASTROSCOPY, DUODENOSCOPY (EGD), COMBINED Left 2/25/2015    Procedure: COMBINED ESOPHAGOSCOPY, GASTROSCOPY, DUODENOSCOPY (EGD), BIOPSY SINGLE OR MULTIPLE;  Surgeon: Clemente Lugo MD;  [facility]     ESOPHAGOSCOPY, GASTROSCOPY, DUODENOSCOPY (EGD), COMBINED N/A 9/25/2016    Procedure: COMBINED ESOPHAGOSCOPY, GASTROSCOPY, DUODENOSCOPY (EGD);  Surgeon: Aziza Patiño MD;  [facility]     ESOPHAGOSCOPY, GASTROSCOPY, DUODENOSCOPY (EGD), COMBINED N/A 1/18/2017    Procedure: COMBINED ESOPHAGOSCOPY, GASTROSCOPY, DUODENOSCOPY (EGD), BIOPSY SINGLE OR MULTIPLE;  Surgeon: Clemente Lopez MD;  [facility]     ESOPHAGOSCOPY, GASTROSCOPY, DUODENOSCOPY (EGD), COMBINED N/A 11/26/2017    Procedure: COMBINED ESOPHAGOSCOPY, GASTROSCOPY, DUODENOSCOPY (EGD), REMOVE FOREIGN BODY;  Esophagogastroduodenoscopy with foreign body extraction  ;  Surgeon: Herberth Castrejon MD;  [facility]     ESOPHAGOSCOPY, GASTROSCOPY, DUODENOSCOPY (EGD), COMBINED N/A 11/26/2017    Procedure: COMBINED ESOPHAGOSCOPY, GASTROSCOPY, DUODENOSCOPY (EGD), REMOVE FOREIGN BODY;;  Surgeon: Herberth Castrejon MD;  [facility]     FASCIOTOMY LOWER EXTREMITY Left 6/10/2016    Procedure: FASCIOTOMY LOWER EXTREMITY;  Surgeon: Mello Rodriguez MD;  [facility]     HC CAPSULE ENDOSCOPY N/A 8/25/2014    Procedure: CAPSULE/PILL CAM ENDOSCOPY;  Surgeon: Remy Haskins MD;  [facility]     HC CAPSULE ENDOSCOPY N/A 10/2/2014    Procedure: CAPSULE/PILL CAM ENDOSCOPY;  Surgeon: Remy Haskins MD;  [facility]     ORTHOPEDIC SURGERY      broken wrist repair      SEX TRANSFORMATION SURGERY, MALE TO FEMALE      1974     SINUS SURGERY      cyst removed     TONSILLECTOMY       VASCULAR SURGERY      Left carotid stent     FAMILY HISTORY  Family History   Problem Relation Age of Onset     Dementia Mother      Glaucoma Mother      Diabetes Mother         may have been type 1, childhood     Coronary Artery Disease Mother         MI     Glaucoma Father      Diabetes Father         may hev been type 1 - ??     Heart Failure Father      Cancer Maternal Aunt         leukemia     Schizophrenia Brother      Depression Brother      Suicide Sister      Depression Sister      Diabetes Sister      Cancer Maternal Aunt         ovarian     Glaucoma Maternal Grandmother      Diabetes Maternal Grandmother      Glaucoma Maternal Grandfather      Diabetes Maternal Grandfather      Glaucoma Paternal Grandmother      Diabetes Paternal Grandmother      Glaucoma Paternal Grandfather      Diabetes Paternal Grandfather      Breast Cancer Sister      Cerebrovascular Disease Brother      Colon Cancer No family hx of      Macular Degeneration No family hx of      SOCIAL HISTORY  Social History     Tobacco Use     Smoking status: Current Every Day Smoker     Packs/day: 0.25     Years: 30.00     Pack years: 7.50     Types: Cigarettes, Cigars     Smokeless tobacco: Never Used   Substance Use Topics     Alcohol use: No     Alcohol/week: 0.0 oz     MEDICATIONS  No current facility-administered medications for this encounter.      Current Outpatient Medications   Medication     ACETAMINOPHEN PO     ADVAIR DISKUS 250-50 MCG/DOSE diskus inhaler     albuterol (PROVENTIL) (2.5 MG/3ML) 0.083% neb solution     alendronate (FOSAMAX) 70 MG tablet     aspirin EC 81 MG EC tablet     atorvastatin (LIPITOR) 40 MG tablet     blood glucose monitoring (ONE TOUCH ULTRA 2) meter device kit     blood glucose monitoring (ONE TOUCH ULTRA) test strip     blood glucose monitoring (ONE TOUCH ULTRASOFT) lancets     brimonidine  (ALPHAGAN) 0.2 % ophthalmic solution     calcium citrate-vitamin D (CITRACAL) 315-250 MG-UNIT TABS     cetirizine (ZYRTEC) 10 MG tablet     Cholecalciferol (VITAMIN D) 2000 UNITS tablet     clotrimazole (LOTRIMIN) 1 % cream     CYANOCOBALAMIN PO     diclofenac (VOLTAREN) 1 % GEL topical gel     dorzolamide (TRUSOPT) 2 % ophthalmic solution     EPINEPHrine (EPIPEN/ADRENACLICK/OR ANY BX GENERIC EQUIV) 0.3 MG/0.3ML injection 2-pack     escitalopram (LEXAPRO) 10 MG tablet     ferrous sulfate (IRON) 325 (65 FE) MG tablet     fluticasone (FLONASE) 50 MCG/ACT nasal spray     lactulose (CHRONULAC) 10 GM/15ML solution     latanoprost (XALATAN) 0.005 % ophthalmic solution     levETIRAcetam (KEPPRA) 500 MG tablet     lisinopril (PRINIVIL/ZESTRIL) 20 MG tablet     metFORMIN (GLUCOPHAGE) 500 MG tablet     metFORMIN (GLUCOPHAGE-XR) 500 MG 24 hr tablet     metoprolol succinate ER (TOPROL-XL) 50 MG 24 hr tablet     Multiple Vitamin (MULTIVITAMIN OR)     nicotine (NICODERM CQ) 14 MG/24HR 24 hr patch     nitroglycerin (NITROSTAT) 0.4 MG SL tablet     ondansetron (ZOFRAN-ODT) 8 MG ODT tab     order for DME     order for DME     order for DME     order for DME     order for DME     order for DME     order for DME     order for DME     order for DME     order for DME     order for DME     ORDER FOR DME     ORDER FOR DME     pantoprazole (PROTONIX) 40 MG EC tablet     phenytoin (DILANTIN) 100 MG capsule     phenytoin 200 MG CAPS     polyethylene glycol (MIRALAX/GLYCOLAX) Packet     pregabalin (LYRICA) 150 MG capsule     risperiDONE (RISPERDAL) 0.5 MG tablet     sennosides (SENOKOT) 8.6 MG tablet     solifenacin (VESICARE) 10 MG tablet     sucralfate (CARAFATE) 1 GM tablet     traZODone (DESYREL) 150 MG tablet     traZODone (DESYREL) 50 MG tablet     umeclidinium (INCRUSE ELLIPTA) 62.5 MCG/INH inhaler     VENTOLIN  (90 Base) MCG/ACT inhaler     zinc 50 MG TABS     ALLERGIES  Allergies   Allergen Reactions     Bee Venom       "Penicillins Anaphylaxis     Other reaction(s): Skin Rash and/or Hives     Dilantin [Phenytoin] Other (See Comments)     Generic dilantin only per pt     Iodide Hives     09/11/15: Pt states she can use iodine and doesn't have any problems      Iodine-131      Novocaine [Procaine] Hives     Other reaction(s): Skin Rash and/or Hives     Tositumomab          I have reviewed the Medications, Allergies, Past Medical and Surgical History, and Social History in the Epic system.    Review of Systems   Constitutional: Positive for chills. Negative for fever.   Gastrointestinal: Positive for abdominal pain, blood in stool (melenic) and vomiting. Negative for diarrhea.   Genitourinary: Positive for dysuria.   All other systems reviewed and are negative.      Physical Exam   BP: 131/76  Pulse: 67  Temp: 97.7  F (36.5  C)  Resp: 16  Height: 160 cm (5' 3\")  Weight: 59 kg (130 lb)  SpO2: 100 %      Physical Exam   Constitutional: She is oriented to person, place, and time. No distress.   HENT:   Head: Normocephalic and atraumatic.   Mouth/Throat: Oropharynx is clear and moist.   Eyes: Pupils are equal, round, and reactive to light. Conjunctivae are normal.   Neck: Normal range of motion. Neck supple.   Cardiovascular: Normal rate and intact distal pulses.   Pulmonary/Chest: Effort normal. No respiratory distress.   Abdominal: Soft. There is no tenderness. There is no rebound and no guarding.   Musculoskeletal: Normal range of motion.   Neurological: She is alert and oriented to person, place, and time.   Skin: Skin is warm and dry. She is not diaphoretic.   Psychiatric: She has a normal mood and affect. Her behavior is normal. Thought content normal.   Nursing note and vitals reviewed.      ED Course        Procedures   4:28 PM  The patient was seen and examined by Dr. Sam in [facility]                Critical Care time:  none             Labs Ordered and Resulted from Time of ED Arrival Up to the Time of Departure from the ED " - No data to display         Assessments & Plan (with Medical Decision Making)   This is a 69-year-old female who presents with abdominal pain, bright red blood per rectum,  and general malaise.  She believes that she has a urinary tract infection from her suprapubic catheter.  Urinalysis shows 4 WBCs with large leukocyte esterase and negative nitrates.  This does not appear to be the source of her symptoms and we will await culture results prior to treatment.  Her lab evaluation was significant for leukocytosis of 15.1.  She was afebrile and did not appear septic.  Hemoglobin was 11.6 from 14, although she has had hemoglobins in the 11 range before.  She has also had GI bleeding from esophagitis and gastritis in the past.  She was treated with Protonix 40 mg IV.  Will admit to ED observation for serial hemoglobins and monitoring her clinical course.      I have reviewed the nursing notes.    I have reviewed the findings, diagnosis, plan and need for follow up with the patient.       Medication List      There are no discharge medications for this visit.         Final diagnoses:   None     IGarfield, am serving as a trained medical scribe to document services personally performed by Alexis Sam MD, based on the provider's statements to me.      IAlexis MD, was physically present and have reviewed and verified the accuracy of this note documented by Garfield Alexandra.     6/14/2019   [facility], [place], EMERGENCY DEPARTMENT     Alexis Sam MD  06/24/19 [id number]

## 2019-06-14 NOTE — ED TRIAGE NOTES
Pt BIBA from assisted living with complaints of abdominal pain/back pain, clammy, lightheaded - pt concerned for UTI. Pt has suprapubic catheter, pt reports lower output than normal. 4mg IM zofran given en route. A&O.

## 2019-06-15 LAB
ABO + RH BLD: NORMAL
ABO + RH BLD: NORMAL
ALBUMIN SERPL-MCNC: 3 G/DL (ref 3.4–5)
ALP SERPL-CCNC: 168 U/L (ref 40–150)
ALT SERPL W P-5'-P-CCNC: 37 U/L (ref 0–50)
AMPHETAMINES UR QL SCN: NEGATIVE
ANION GAP SERPL CALCULATED.3IONS-SCNC: 6 MMOL/L (ref 3–14)
AST SERPL W P-5'-P-CCNC: 24 U/L (ref 0–45)
BARBITURATES UR QL: NEGATIVE
BASOPHILS # BLD AUTO: 0 10E9/L (ref 0–0.2)
BASOPHILS NFR BLD AUTO: 0.4 %
BENZODIAZ UR QL: NEGATIVE
BILIRUB SERPL-MCNC: 0.2 MG/DL (ref 0.2–1.3)
BLD GP AB SCN SERPL QL: NORMAL
BLOOD BANK CMNT PATIENT-IMP: NORMAL
BUN SERPL-MCNC: 14 MG/DL (ref 7–30)
CALCIUM SERPL-MCNC: 8.8 MG/DL (ref 8.5–10.1)
CANNABINOIDS UR QL SCN: NEGATIVE
CHLORIDE SERPL-SCNC: 108 MMOL/L (ref 94–109)
CO2 SERPL-SCNC: 27 MMOL/L (ref 20–32)
COCAINE UR QL: NEGATIVE
CREAT SERPL-MCNC: 0.52 MG/DL (ref 0.52–1.04)
DIFFERENTIAL METHOD BLD: ABNORMAL
EOSINOPHIL # BLD AUTO: 0.1 10E9/L (ref 0–0.7)
EOSINOPHIL NFR BLD AUTO: 0.6 %
ERYTHROCYTE [DISTWIDTH] IN BLOOD BY AUTOMATED COUNT: 16.3 % (ref 10–15)
ERYTHROCYTE [DISTWIDTH] IN BLOOD BY AUTOMATED COUNT: 16.5 % (ref 10–15)
ETHANOL UR QL SCN: NEGATIVE
GFR SERPL CREATININE-BSD FRML MDRD: >90 ML/MIN/{1.73_M2}
GLUCOSE BLDC GLUCOMTR-MCNC: 112 MG/DL (ref 70–99)
GLUCOSE BLDC GLUCOMTR-MCNC: 117 MG/DL (ref 70–99)
GLUCOSE BLDC GLUCOMTR-MCNC: 124 MG/DL (ref 70–99)
GLUCOSE BLDC GLUCOMTR-MCNC: 204 MG/DL (ref 70–99)
GLUCOSE BLDC GLUCOMTR-MCNC: 63 MG/DL (ref 70–99)
GLUCOSE BLDC GLUCOMTR-MCNC: 69 MG/DL (ref 70–99)
GLUCOSE BLDC GLUCOMTR-MCNC: 84 MG/DL (ref 70–99)
GLUCOSE SERPL-MCNC: 70 MG/DL (ref 70–99)
HCT VFR BLD AUTO: 35.6 % (ref 35–47)
HCT VFR BLD AUTO: 37.1 % (ref 35–47)
HGB BLD-MCNC: 11 G/DL (ref 11.7–15.7)
HGB BLD-MCNC: 11.1 G/DL (ref 11.7–15.7)
IMM GRANULOCYTES # BLD: 0 10E9/L (ref 0–0.4)
IMM GRANULOCYTES NFR BLD: 0.4 %
IRON SATN MFR SERPL: 20 % (ref 15–46)
IRON SERPL-MCNC: 45 UG/DL (ref 35–180)
LACTATE BLD-SCNC: 0.9 MMOL/L (ref 0.7–2)
LYMPHOCYTES # BLD AUTO: 1.3 10E9/L (ref 0.8–5.3)
LYMPHOCYTES NFR BLD AUTO: 14.1 %
MCH RBC QN AUTO: 28.3 PG (ref 26.5–33)
MCH RBC QN AUTO: 28.3 PG (ref 26.5–33)
MCHC RBC AUTO-ENTMCNC: 29.9 G/DL (ref 31.5–36.5)
MCHC RBC AUTO-ENTMCNC: 30.9 G/DL (ref 31.5–36.5)
MCV RBC AUTO: 92 FL (ref 78–100)
MCV RBC AUTO: 95 FL (ref 78–100)
MONOCYTES # BLD AUTO: 0.7 10E9/L (ref 0–1.3)
MONOCYTES NFR BLD AUTO: 7.3 %
NEUTROPHILS # BLD AUTO: 7.2 10E9/L (ref 1.6–8.3)
NEUTROPHILS NFR BLD AUTO: 77.2 %
NRBC # BLD AUTO: 0 10*3/UL
NRBC BLD AUTO-RTO: 0 /100
OPIATES UR QL SCN: NEGATIVE
PLATELET # BLD AUTO: 282 10E9/L (ref 150–450)
PLATELET # BLD AUTO: 308 10E9/L (ref 150–450)
POTASSIUM SERPL-SCNC: 3.4 MMOL/L (ref 3.4–5.3)
PROT SERPL-MCNC: 7.5 G/DL (ref 6.8–8.8)
RBC # BLD AUTO: 3.89 10E12/L (ref 3.8–5.2)
RBC # BLD AUTO: 3.92 10E12/L (ref 3.8–5.2)
SODIUM SERPL-SCNC: 141 MMOL/L (ref 133–144)
SPECIMEN EXP DATE BLD: NORMAL
TIBC SERPL-MCNC: 224 UG/DL (ref 240–430)
TRANSFERRIN SERPL-MCNC: 192 MG/DL (ref 210–360)
WBC # BLD AUTO: 9.4 10E9/L (ref 4–11)
WBC # BLD AUTO: 9.8 10E9/L (ref 4–11)

## 2019-06-15 PROCEDURE — 96376 TX/PRO/DX INJ SAME DRUG ADON: CPT

## 2019-06-15 PROCEDURE — 80320 DRUG SCREEN QUANTALCOHOLS: CPT | Performed by: NURSE PRACTITIONER

## 2019-06-15 PROCEDURE — 25800030 ZZH RX IP 258 OP 636: Performed by: NURSE PRACTITIONER

## 2019-06-15 PROCEDURE — 25000132 ZZH RX MED GY IP 250 OP 250 PS 637: Performed by: NURSE PRACTITIONER

## 2019-06-15 PROCEDURE — 25000128 H RX IP 250 OP 636: Performed by: NURSE PRACTITIONER

## 2019-06-15 PROCEDURE — 99224 ZZC SUBSEQUENT OBSERVATION CARE,LEVEL I: CPT | Mod: Z6 | Performed by: FAMILY MEDICINE

## 2019-06-15 PROCEDURE — 94640 AIRWAY INHALATION TREATMENT: CPT | Mod: 76

## 2019-06-15 PROCEDURE — G0378 HOSPITAL OBSERVATION PER HR: HCPCS

## 2019-06-15 PROCEDURE — C9113 INJ PANTOPRAZOLE SODIUM, VIA: HCPCS | Performed by: NURSE PRACTITIONER

## 2019-06-15 PROCEDURE — 25000125 ZZHC RX 250: Performed by: NURSE PRACTITIONER

## 2019-06-15 PROCEDURE — 83605 ASSAY OF LACTIC ACID: CPT | Performed by: NURSE PRACTITIONER

## 2019-06-15 PROCEDURE — 36415 COLL VENOUS BLD VENIPUNCTURE: CPT | Performed by: NURSE PRACTITIONER

## 2019-06-15 PROCEDURE — 94660 CPAP INITIATION&MGMT: CPT

## 2019-06-15 PROCEDURE — 83550 IRON BINDING TEST: CPT | Performed by: NURSE PRACTITIONER

## 2019-06-15 PROCEDURE — 80053 COMPREHEN METABOLIC PANEL: CPT | Performed by: NURSE PRACTITIONER

## 2019-06-15 PROCEDURE — 94640 AIRWAY INHALATION TREATMENT: CPT

## 2019-06-15 PROCEDURE — 85025 COMPLETE CBC W/AUTO DIFF WBC: CPT | Performed by: NURSE PRACTITIONER

## 2019-06-15 PROCEDURE — 40000274 ZZH STATISTIC RCP CONSULT EA 30 MIN

## 2019-06-15 PROCEDURE — 83540 ASSAY OF IRON: CPT | Performed by: NURSE PRACTITIONER

## 2019-06-15 PROCEDURE — 85027 COMPLETE CBC AUTOMATED: CPT | Performed by: NURSE PRACTITIONER

## 2019-06-15 PROCEDURE — 86901 BLOOD TYPING SEROLOGIC RH(D): CPT | Performed by: NURSE PRACTITIONER

## 2019-06-15 PROCEDURE — 80307 DRUG TEST PRSMV CHEM ANLYZR: CPT | Performed by: NURSE PRACTITIONER

## 2019-06-15 PROCEDURE — 00000146 ZZHCL STATISTIC GLUCOSE BY METER IP

## 2019-06-15 PROCEDURE — 25800025 ZZH RX 258: Performed by: NURSE PRACTITIONER

## 2019-06-15 PROCEDURE — 84466 ASSAY OF TRANSFERRIN: CPT | Performed by: NURSE PRACTITIONER

## 2019-06-15 PROCEDURE — 40000275 ZZH STATISTIC RCP TIME EA 10 MIN: Mod: 76

## 2019-06-15 PROCEDURE — 86850 RBC ANTIBODY SCREEN: CPT | Performed by: NURSE PRACTITIONER

## 2019-06-15 PROCEDURE — 86900 BLOOD TYPING SEROLOGIC ABO: CPT | Performed by: NURSE PRACTITIONER

## 2019-06-15 RX ORDER — SUCRALFATE ORAL 1 G/10ML
1 SUSPENSION ORAL
Status: DISCONTINUED | OUTPATIENT
Start: 2019-06-15 | End: 2019-06-16 | Stop reason: HOSPADM

## 2019-06-15 RX ORDER — POLYETHYLENE GLYCOL 3350 17 G/17G
17 POWDER, FOR SOLUTION ORAL DAILY
Status: DISCONTINUED | OUTPATIENT
Start: 2019-06-15 | End: 2019-06-16 | Stop reason: HOSPADM

## 2019-06-15 RX ORDER — DEXTROSE MONOHYDRATE 25 G/50ML
25-50 INJECTION, SOLUTION INTRAVENOUS
Status: DISCONTINUED | OUTPATIENT
Start: 2019-06-15 | End: 2019-06-16 | Stop reason: HOSPADM

## 2019-06-15 RX ORDER — NICOTINE POLACRILEX 4 MG
15-30 LOZENGE BUCCAL
Status: DISCONTINUED | OUTPATIENT
Start: 2019-06-15 | End: 2019-06-16 | Stop reason: HOSPADM

## 2019-06-15 RX ADMIN — PREGABALIN 150 MG: 75 CAPSULE ORAL at 20:47

## 2019-06-15 RX ADMIN — SUCRALFATE 1 G: 1 SUSPENSION ORAL at 22:06

## 2019-06-15 RX ADMIN — PANTOPRAZOLE SODIUM 40 MG: 40 INJECTION, POWDER, FOR SOLUTION INTRAVENOUS at 20:48

## 2019-06-15 RX ADMIN — POLYETHYLENE GLYCOL 3350 17 G: 17 POWDER, FOR SOLUTION ORAL at 20:48

## 2019-06-15 RX ADMIN — SUCRALFATE 1 G: 1 SUSPENSION ORAL at 10:27

## 2019-06-15 RX ADMIN — ESCITALOPRAM OXALATE 10 MG: 10 TABLET ORAL at 22:07

## 2019-06-15 RX ADMIN — DEXTROSE AND SODIUM CHLORIDE: 5; 900 INJECTION, SOLUTION INTRAVENOUS at 10:21

## 2019-06-15 RX ADMIN — PHENYTOIN SODIUM 200 MG: 100 CAPSULE ORAL at 10:24

## 2019-06-15 RX ADMIN — LEVETIRACETAM 500 MG: 500 TABLET ORAL at 08:50

## 2019-06-15 RX ADMIN — RISPERIDONE 0.5 MG: 0.5 TABLET ORAL at 22:22

## 2019-06-15 RX ADMIN — ACETAMINOPHEN 650 MG: 325 TABLET, FILM COATED ORAL at 09:43

## 2019-06-15 RX ADMIN — SENNOSIDES 1 TABLET: 8.6 TABLET, FILM COATED ORAL at 08:49

## 2019-06-15 RX ADMIN — DEXTROSE AND SODIUM CHLORIDE: 5; 900 INJECTION, SOLUTION INTRAVENOUS at 20:10

## 2019-06-15 RX ADMIN — ONDANSETRON 4 MG: 2 INJECTION INTRAMUSCULAR; INTRAVENOUS at 21:28

## 2019-06-15 RX ADMIN — IPRATROPIUM BROMIDE AND ALBUTEROL SULFATE 3 ML: .5; 3 SOLUTION RESPIRATORY (INHALATION) at 12:48

## 2019-06-15 RX ADMIN — LISINOPRIL 20 MG: 20 TABLET ORAL at 08:49

## 2019-06-15 RX ADMIN — PANTOPRAZOLE SODIUM 40 MG: 40 INJECTION, POWDER, FOR SOLUTION INTRAVENOUS at 08:50

## 2019-06-15 RX ADMIN — IPRATROPIUM BROMIDE AND ALBUTEROL SULFATE 3 ML: .5; 3 SOLUTION RESPIRATORY (INHALATION) at 09:05

## 2019-06-15 RX ADMIN — ATORVASTATIN CALCIUM 40 MG: 40 TABLET, FILM COATED ORAL at 08:50

## 2019-06-15 RX ADMIN — PREGABALIN 150 MG: 75 CAPSULE ORAL at 08:49

## 2019-06-15 RX ADMIN — METOPROLOL SUCCINATE 50 MG: 50 TABLET, EXTENDED RELEASE ORAL at 08:50

## 2019-06-15 RX ADMIN — NICOTINE 1 PATCH: 14 PATCH, EXTENDED RELEASE TRANSDERMAL at 22:06

## 2019-06-15 RX ADMIN — SODIUM CHLORIDE: 9 INJECTION, SOLUTION INTRAVENOUS at 07:16

## 2019-06-15 RX ADMIN — SUCRALFATE 1 G: 1 SUSPENSION ORAL at 19:39

## 2019-06-15 RX ADMIN — TRAZODONE HYDROCHLORIDE 150 MG: 50 TABLET ORAL at 22:07

## 2019-06-15 RX ADMIN — ACETAMINOPHEN 650 MG: 325 TABLET, FILM COATED ORAL at 22:22

## 2019-06-15 RX ADMIN — IPRATROPIUM BROMIDE AND ALBUTEROL SULFATE 3 ML: .5; 3 SOLUTION RESPIRATORY (INHALATION) at 16:25

## 2019-06-15 RX ADMIN — ONDANSETRON 4 MG: 2 INJECTION INTRAMUSCULAR; INTRAVENOUS at 14:22

## 2019-06-15 RX ADMIN — PREGABALIN 150 MG: 75 CAPSULE ORAL at 14:51

## 2019-06-15 RX ADMIN — PHENYTOIN SODIUM 200 MG: 100 CAPSULE ORAL at 22:06

## 2019-06-15 RX ADMIN — TOLTERODINE 4 MG: 4 CAPSULE, EXTENDED RELEASE ORAL at 08:50

## 2019-06-15 RX ADMIN — LEVETIRACETAM 500 MG: 500 TABLET ORAL at 20:47

## 2019-06-15 RX ADMIN — IPRATROPIUM BROMIDE AND ALBUTEROL SULFATE 3 ML: .5; 3 SOLUTION RESPIRATORY (INHALATION) at 19:42

## 2019-06-15 RX ADMIN — DEXTROSE 50 % IN WATER (D50W) INTRAVENOUS SYRINGE 25 ML: at 07:12

## 2019-06-15 RX ADMIN — DEXTROSE 50 % IN WATER (D50W) INTRAVENOUS SYRINGE 50 ML: at 09:36

## 2019-06-15 NOTE — CONSULTS
GASTROENTEROLOGY CONSULTATION      Date of Admission:  6/14/2019          ASSESSMENT AND RECOMMENDATIONS:     69 year old female with a history of chronic obstructive pulmonary disease, congestive heart failure, type 2 diabetes, hyperlipidemia, gas esophageal reflux disease and dysphagia, as well as bilateral balloon amputation; admitted to the hospital with multiple symptoms and seen by gastroenterology service for concern for GI bleed.     #. Concern for GI bleed:  Patient presents with concerns for GI bleed given abdominal pain and subjective black tarry stools.  She is currently on iron supplementation at home.  She has brown stool on rectal exam.  Her hemoglobin is stable, and her MCV is 92.  She remains hemodynamically stable, and is not tachycardic.  She has had multiple EGDs in the past, last one being over a year ago for dysphagia which was significant for esophageal stenosis that was dilated.  At this time she does not have any evidence of an overt gastrointestinal bleed.  For this reason reason we do not recommend any endoscopic evaluation.  It may be reasonable to obtain iron studies given anemia, and monitor closely for any evidence of GI bleed today.    RECOMMENDATIONS  -- Allowed to eat  -- Obtain iron studies and ferritin  -- Monitor stool output  -- Monitor hemoglobin   Transfuse for hemoglobin less than 7  -- No endoscopic evaluation at this time.  -- GI will continue to follow.    Gastroenterology follow up recommendations: TBD    Thank you for involving us in this patient's care. Please do not hesitate to contact the GI service with any questions or concerns.     Patient care plan discussed with Dr. Castrejon, GI staff physician.    Noe Baker  Gastroenterology   PGY 4 ([phone])            Chief Complaint:   We were asked by Ashly Romero of ED [facility] to evaluate this patient with Concern for GI bleed  History is obtained from the patient and the medical record.          History  of Present Illness:   Sonya Foote is a 69 year old female with a history of chronic obstructive pulmonary disease, congestive heart failure, type 2 diabetes, hyperlipidemia, gas esophageal reflux disease and dysphagia, as well as bilateral balloon amputation; admitted to the hospital with multiple symptoms and seen by gastroenterology service for concern for GI bleed.  Patient complains of generalized abdominal pain which has been ongoing as well as dark stools for the last 1 week.  She states that she has had black stools occuring at different times in the last few months.  She denies any hematemesis, nausea, or coffee-ground vomiting.  She states abdominal pain has always been present, she localizes it to her right upper quadrant and central abdomen.  She denies any fevers or chills.  She takes a baby aspirin at home, but denies any use of any other NSAIDs.  She smokes about 10 cigarettes/day, and does not drink any alcohol.  She has a history of dysphagia, and her last upper endoscopy was in January 2018 showing esophageal stenosis status post dilation.            Past Medical History:   Reviewed and edited as appropriate  Past Medical History:   Diagnosis Date     Anemia      Antiplatelet or antithrombotic long-term use      Arthritis      AS (sickle cell trait) (H) 10/8/2013     Blind left eye      BPPV (benign paroxysmal positional vertigo)      Chronic back pain      COPD (chronic obstructive pulmonary disease) (H)      Coronary artery disease      CVA (cerebral infarction) 10/8/2012    x3, residual left sided weakness     Diastolic CHF (H) 9/4/2012     Dilantin toxicity 5/31/2013     Esophageal candidiasis (H)      GERD (gastroesophageal reflux disease)      Heart attack (H)      Hernia, abdominal      History of blood transfusion     x5     History of thrombophlebitis      HTN (hypertension) 9/4/2012     Hyperlipidemia LDL goal <100 9/4/2012     Legally blind in right eye, as defined in [place]     No periphal  vision right eye     Osteoporosis 1/21/2013     Other chronic pain      PAD (peripheral artery disease) (H)      Palpitations      Person who has had sex change operation 1/20/2014     Pneumonia      Schizoaffective disorder (H)      Seizure disorder (H) 9/4/2012     Sleep apnea     CPAP     Thrombosis of leg      Type 2 diabetes, HbA1C goal < 8% (H) 9/4/2012     Uncomplicated asthma      Unspecified cerebral artery occlusion with cerebral infarction             Past Surgical History:   Reviewed and edited as appropriate   Past Surgical History:   Procedure Laterality Date     AMPUTATE LEG ABOVE KNEE Left 6/11/2016    Procedure: AMPUTATE LEG ABOVE KNEE;  Surgeon: Mello Rodriguez MD;  [facility]     AMPUTATE LEG BELOW KNEE Right 11/7/2016    Procedure: AMPUTATE LEG BELOW KNEE;  Surgeon: Savannah Durant MD;  [facility]     AMPUTATE REVISION STUMP LOWER EXTREMITY Right 11/11/2016    Procedure: AMPUTATE REVISION STUMP LOWER EXTREMITY;  Surgeon: Savannah Durant MD;  [facility]     AMPUTATE REVISION STUMP LOWER EXTREMITY Right 11/16/2016    Procedure: AMPUTATE REVISION STUMP LOWER EXTREMITY;  Surgeon: Savannah Durant MD;  [facility]     AMPUTATE TOE(S) Right 1/5/2016    Procedure: AMPUTATE TOE(S);  Surgeon: Mello Gaines DPM;  [facility]     ANGIOGRAM Bilateral 11/21/2014    Procedure: ANGIOGRAM;  Surgeon: Savannah Durant MD;  [facility]     ANGIOGRAM Left 1/16/2015    Procedure: ANGIOGRAM;  Surgeon: Savannah Durant MD;  [facility]     ANGIOGRAM Bilateral 9/14/2015    Procedure: ANGIOGRAM;  Surgeon: Savannah Durant MD;  [facility]     ANGIOGRAM Left 10/12/2015    Procedure: ANGIOGRAM;  Surgeon: Savannah Durant MD;  [facility]     ANGIOGRAM Right 6/6/2016    Procedure: ANGIOGRAM;  Surgeon: Savannah Durant MD;  [facility]     ANGIOPLASTY Right 6/6/2016    Procedure: ANGIOPLASTY;  Surgeon: Savannah Durant MD;  [facility]     APPENDECTOMY       BREAST SURGERY      right breast bx  (benign)     CATARACT IOL, RT/LT Right      CHOLECYSTECTOMY       COLONOSCOPY N/A 8/25/2014    Procedure: COLONOSCOPY;  Surgeon: Mello Ferrer MD;  [facility]     COLONOSCOPY WITH CO2 INSUFFLATION N/A 8/20/2014    Procedure: COLONOSCOPY WITH CO2 INSUFFLATION;  Surgeon: Duane, William Charles, MD;  [facility]     CYSTOSTOMY, INSERT TUBE SUPRAPUBIC, COMBINED N/A 1/16/2018    Procedure: COMBINED CYSTOSTOMY, INSERT TUBE SUPRAPUBIC;  Cystoscopy, Intraoperative Ultrasound, Suprapubic Tube Placement;  Surgeon: Keanu Dawson MD;  [facility]     ENDARTERECTOMY FEMORAL  5/23/2014    Procedure: ENDARTERECTOMY FEMORAL;  Surgeon: Jason Joshi MD;  [facility]     ESOPHAGOSCOPY, GASTROSCOPY, DUODENOSCOPY (EGD), COMBINED  12/14/2012    Procedure: COMBINED ESOPHAGOSCOPY, GASTROSCOPY, DUODENOSCOPY (EGD), BIOPSY SINGLE OR MULTIPLE;  ESOPHAGOSCOPY, GASTROSCOPY, DUODENOSCOPY (EGD), DILATATION ;  Surgeon: Elizabeth Stevenson MD;  Location:  GI     ESOPHAGOSCOPY, GASTROSCOPY, DUODENOSCOPY (EGD), COMBINED  12/31/2013    Procedure: COMBINED ESOPHAGOSCOPY, GASTROSCOPY, DUODENOSCOPY (EGD), BIOPSY SINGLE OR MULTIPLE;;  Surgeon: Clemente Lopez MD;  [facility]     ESOPHAGOSCOPY, GASTROSCOPY, DUODENOSCOPY (EGD), COMBINED  4/1/2014    Procedure: COMBINED ESOPHAGOSCOPY, GASTROSCOPY, DUODENOSCOPY (EGD);;  Surgeon: Clemente Lopez MD;  [facility]     ESOPHAGOSCOPY, GASTROSCOPY, DUODENOSCOPY (EGD), COMBINED  6/28/2014    Procedure: COMBINED ESOPHAGOSCOPY, GASTROSCOPY, DUODENOSCOPY (EGD);  Surgeon: Clemente Lopez MD;  [facility]     ESOPHAGOSCOPY, GASTROSCOPY, DUODENOSCOPY (EGD), COMBINED N/A 8/20/2014    Procedure: COMBINED ESOPHAGOSCOPY, GASTROSCOPY, DUODENOSCOPY (EGD), BIOPSY SINGLE OR MULTIPLE;  Surgeon: Duane, William Charles, MD;  [facility]     ESOPHAGOSCOPY, GASTROSCOPY, DUODENOSCOPY (EGD), COMBINED N/A 8/22/2014    Procedure: COMBINED ESOPHAGOSCOPY, GASTROSCOPY, DUODENOSCOPY  (EGD), BIOPSY SINGLE OR MULTIPLE;  Surgeon: Mello Ferrer MD;  [facility]     ESOPHAGOSCOPY, GASTROSCOPY, DUODENOSCOPY (EGD), COMBINED N/A 10/2/2014    Procedure: COMBINED ESOPHAGOSCOPY, GASTROSCOPY, DUODENOSCOPY (EGD), BIOPSY SINGLE OR MULTIPLE;  Surgeon: Remy Haskins MD;  [facility]     ESOPHAGOSCOPY, GASTROSCOPY, DUODENOSCOPY (EGD), COMBINED Left 12/15/2014    Procedure: COMBINED ESOPHAGOSCOPY, GASTROSCOPY, DUODENOSCOPY (EGD), BIOPSY SINGLE OR MULTIPLE;  Surgeon: Remy Haskins MD;  [facility]     ESOPHAGOSCOPY, GASTROSCOPY, DUODENOSCOPY (EGD), COMBINED N/A 2/25/2015    Procedure: COMBINED ENDOSCOPIC ULTRASOUND, ESOPHAGOSCOPY, GASTROSCOPY, DUODENOSCOPY (EGD), FINE NEEDLE ASPIRATE/BIOPSY;  Surgeon: Clemente Lugo MD;  [facility]     ESOPHAGOSCOPY, GASTROSCOPY, DUODENOSCOPY (EGD), COMBINED Left 2/25/2015    Procedure: COMBINED ESOPHAGOSCOPY, GASTROSCOPY, DUODENOSCOPY (EGD), BIOPSY SINGLE OR MULTIPLE;  Surgeon: Clemente Lugo MD;  [facility]     ESOPHAGOSCOPY, GASTROSCOPY, DUODENOSCOPY (EGD), COMBINED N/A 9/25/2016    Procedure: COMBINED ESOPHAGOSCOPY, GASTROSCOPY, DUODENOSCOPY (EGD);  Surgeon: Aziza Patiño MD;  [facility]     ESOPHAGOSCOPY, GASTROSCOPY, DUODENOSCOPY (EGD), COMBINED N/A 1/18/2017    Procedure: COMBINED ESOPHAGOSCOPY, GASTROSCOPY, DUODENOSCOPY (EGD), BIOPSY SINGLE OR MULTIPLE;  Surgeon: Clemente Lopez MD;  [facility]     ESOPHAGOSCOPY, GASTROSCOPY, DUODENOSCOPY (EGD), COMBINED N/A 11/26/2017    Procedure: COMBINED ESOPHAGOSCOPY, GASTROSCOPY, DUODENOSCOPY (EGD), REMOVE FOREIGN BODY;  Esophagogastroduodenoscopy with foreign body extraction  ;  Surgeon: Herberth Castrejon MD;  [facility]     ESOPHAGOSCOPY, GASTROSCOPY, DUODENOSCOPY (EGD), COMBINED N/A 11/26/2017    Procedure: COMBINED ESOPHAGOSCOPY, GASTROSCOPY, DUODENOSCOPY (EGD), REMOVE FOREIGN BODY;;  Surgeon: Herberth Castrejon MD;  [facility]     FASCIOTOMY LOWER  EXTREMITY Left 6/10/2016    Procedure: FASCIOTOMY LOWER EXTREMITY;  Surgeon: Mello Rodriguez MD;  [facility]     HC CAPSULE ENDOSCOPY N/A 8/25/2014    Procedure: CAPSULE/PILL CAM ENDOSCOPY;  Surgeon: Remy Haskins MD;  [facility]     HC CAPSULE ENDOSCOPY N/A 10/2/2014    Procedure: CAPSULE/PILL CAM ENDOSCOPY;  Surgeon: Remy Haskins MD;  [facility]     ORTHOPEDIC SURGERY      broken wrist repair     SEX TRANSFORMATION SURGERY, MALE TO FEMALE      1974     SINUS SURGERY      cyst removed     TONSILLECTOMY       VASCULAR SURGERY      Left carotid stent            Previous Endoscopy:   No results found. However, due to the size of the patient record, not all encounters were searched. Please check Results Review for a complete set of results.         Social History:   Reviewed and edited as appropriate  Social History     Socioeconomic History     Marital status:      Spouse name: Not on file     Number of children: 2     Years of education: Not on file     Highest education level: Not on file   Occupational History     Occupation: retired     Comment: retired   Social Needs     Financial resource strain: Not on file     Food insecurity:     Worry: Not on file     Inability: Not on file     Transportation needs:     Medical: Not on file     Non-medical: Not on file   Tobacco Use     Smoking status: Current Every Day Smoker     Packs/day: 0.25     Years: 30.00     Pack years: 7.50     Types: Cigarettes, Cigars     Smokeless tobacco: Never Used   Substance and Sexual Activity     Alcohol use: No     Alcohol/week: 0.0 oz     Drug use: No     Sexual activity: Not Currently   Lifestyle     Physical activity:     Days per week: Not on file     Minutes per session: Not on file     Stress: Not on file   Relationships     Social connections:     Talks on phone: Not on file     Gets together: Not on file     Attends Baptism service: Not on file     Active member of club or organization:  Not on file     Attends meetings of clubs or organizations: Not on file     Relationship status: Not on file     Intimate partner violence:     Fear of current or ex partner: Not on file     Emotionally abused: Not on file     Physically abused: Not on file     Forced sexual activity: Not on file   Other Topics Concern     Parent/sibling w/ CABG, MI or angioplasty before 65F 55M? Not Asked      Service Not Asked     Blood Transfusions Not Asked     Caffeine Concern No     Comment: 1 in the morning     Occupational Exposure Not Asked     Hobby Hazards Not Asked     Sleep Concern Not Asked     Stress Concern Not Asked     Weight Concern Not Asked     Special Diet Not Asked     Back Care Not Asked     Exercise Not Asked     Bike Helmet Not Asked     Seat Belt Not Asked     Self-Exams Not Asked   Social History Narrative      Her father was in the  and she moved around a lot when she was a kid, and has lived abroad including in [place] and the [facility].  She was previously employed as a mental health worker. Moved from [place] to [place] in 2012. She is currently living in assisted living ([facility]).  Wife passed away 6/2017.            She has 2 adopted children (Kam and Prashanth)          Family History:   Reviewed and edited as appropriate  No known history of gastrointestinal/liver disease or  gastrointestinal malignancies       Allergies:   Reviewed and edited as appropriate     Allergies   Allergen Reactions     Bee Venom      Penicillins Anaphylaxis     Other reaction(s): Skin Rash and/or Hives     Dilantin [Phenytoin] Other (See Comments)     Generic dilantin only per pt     Iodide Hives     09/11/15: Pt states she can use iodine and doesn't have any problems      Iodine-131      Novocaine [Procaine] Hives     Other reaction(s): Skin Rash and/or Hives     Tositumomab           Medications:     Medications Prior to Admission   Medication Sig Dispense Refill Last Dose      ACETAMINOPHEN PO Take 1,000 mg by mouth every 6 hours as needed for pain Not to exceed 4000 mg/day   Unknown     ADVAIR DISKUS 250-50 MCG/DOSE diskus inhaler Inhale 1 puff into the lungs 2 times daily 60 Inhaler 11 6/14/2019 at AM     albuterol (PROVENTIL) (2.5 MG/3ML) 0.083% neb solution INHALE 1 VIAL VIA NEBULIZER EVERY 6 HOURS AS NEEDED 360 mL 11 Unknown     alendronate (FOSAMAX) 70 MG tablet Take 1 tablet (70 mg) by mouth with 8oz water every 7 days 30 minutes before breakfast and remain upright during this time. 12 tablet 3 6/10/2019 at AM     aspirin EC 81 MG EC tablet Take 1 tablet (81 mg) by mouth daily 90 tablet 3 6/14/2019 at AM     atorvastatin (LIPITOR) 40 MG tablet TAKE 1 TAB BY MOUTH ONCE DAILY 30 tablet 11 6/14/2019 at AM     brimonidine (ALPHAGAN) 0.2 % ophthalmic solution Place 1 drop into the right eye 2 times daily 1 Bottle 11 6/14/2019 at AM     cetirizine (ZYRTEC) 10 MG tablet Take 1 tablet (10 mg) by mouth every evening 30 tablet 3 Unknown     Cholecalciferol (VITAMIN D) 2000 UNITS tablet Take 2,000 Units by mouth daily. 100 tablet 3 Unknown     clotrimazole (LOTRIMIN) 1 % cream INSERT 1 APPLICATORFUL VAGINALLY TWICE A DAY FOR VAGINAL PAIN 12 g 0 Unknown     diclofenac (VOLTAREN) 1 % GEL topical gel Apply 2 g topically 4 times daily to hands 100 g 11 Unknown     dorzolamide (TRUSOPT) 2 % ophthalmic solution Place 1 drop into the right eye 2 times daily 1 Bottle 11 6/14/2019 at AM     EPINEPHrine (EPIPEN/ADRENACLICK/OR ANY BX GENERIC EQUIV) 0.3 MG/0.3ML injection 2-pack Inject 0.3 mLs (0.3 mg) into the muscle as needed for anaphylaxis 0.6 mL 1 Unknown     escitalopram (LEXAPRO) 10 MG tablet TAKE 1 TAB BY MOUTH ONCE DAILY 30 tablet 11 6/13/2019 at PM     ferrous sulfate (IRON) 325 (65 FE) MG tablet Take 1 tablet (325 mg) by mouth 2 times daily With meals 60 tablet 2 6/14/2019 at AM     fluticasone (FLONASE) 50 MCG/ACT nasal spray Spray 1 spray into both nostrils daily   Unknown     lactulose  (CHRONULAC) 10 GM/15ML solution Take 20 g by mouth as needed for constipation   Unknown     latanoprost (XALATAN) 0.005 % ophthalmic solution Place 1 drop into both eyes At Bedtime 2.5 mL 11 6/13/2019 at PM     levETIRAcetam (KEPPRA) 500 MG tablet TAKE 1 TAB BY MOUTH TWICE A DAY 60 tablet 5 6/13/2019 at PM     lisinopril (PRINIVIL/ZESTRIL) 20 MG tablet TAKE 1 TAB BY MOUTH ONCE DAILY 30 tablet 11 6/14/2019 at AM     metFORMIN (GLUCOPHAGE) 500 MG tablet TAKE 1 TAB BY MOUTH IN THE EVENING WITH DINNER 30 tablet 5 6/13/2019 at PM     metoprolol succinate ER (TOPROL-XL) 50 MG 24 hr tablet TAKE 1 TAB BY MOUTH ONCE DAILY 30 tablet 11 6/14/2019 at AM     nicotine (NICODERM CQ) 14 MG/24HR 24 hr patch Place 1 patch onto the skin every 24 hours 30 patch 3 Unknown     nitroglycerin (NITROSTAT) 0.4 MG SL tablet Place 1 tablet (0.4 mg) under the tongue every 5 minutes as needed for chest pain if you are still having symptoms after 3 doses (15 minutes) call 911. 25 tablet 1 Unknown     ondansetron (ZOFRAN-ODT) 8 MG ODT tab Take 1 tablet (8 mg) by mouth every 8 hours as needed 30 tablet 5 Unknown     pantoprazole (PROTONIX) 40 MG EC tablet TAKE 1 TAB BY MOUTH ONCE DAILY 30 tablet 11 6/14/2019 at AM     phenytoin (DILANTIN) 100 MG capsule TAKE 2 CAPS (200MG) BY MOUTH TWICE A  capsule 11 6/14/2019 at AM     polyethylene glycol (MIRALAX/GLYCOLAX) Packet Take 17 g by mouth daily Dissolved in water or juice    Unknown     pregabalin (LYRICA) 75 MG capsule Take 150 mg by mouth 3 times daily   6/14/2019 at Noon     risperiDONE (RISPERDAL) 0.5 MG tablet TAKE 1 TAB BY MOUTH AT BEDTIME 30 tablet 5 6/13/2019 at PM     sennosides (SENOKOT) 8.6 MG tablet Take 1 tablet by mouth 2 times daily    Unknown     solifenacin (VESICARE) 10 MG tablet TAKE 1 TAB BY MOUTH ONCE DAILY 30 tablet 11 Unknown     sucralfate (CARAFATE) 1 GM tablet TAKE 1 TAB BY MOUTH FOUR TIMES DAILY. MAY DISSOLVE IN 10ML WATER ISNECESSARY 120 tablet 11 6/14/2019 at Noon      traZODone (DESYREL) 150 MG tablet TAKE 1 TAB BY MOUTH AT BEDTIME 30 tablet 11 6/13/2019 at PM     umeclidinium (INCRUSE ELLIPTA) 62.5 MCG/INH inhaler Inhale 1 puff into the lungs daily 1 Inhaler 6 Unknown     VENTOLIN  (90 Base) MCG/ACT inhaler Inhale 2 puffs into the lungs every 6 hours as needed for shortness of breath / dyspnea or wheezing 8.5 g 11 Unknown     blood glucose monitoring (ONE TOUCH ULTRA 2) meter device kit Use to test blood sugars 3 times daily or as directed. 1 kit 0      blood glucose monitoring (ONE TOUCH ULTRA) test strip Use to test blood sugars 3 times daily or as directed. 3 Box 3      blood glucose monitoring (ONE TOUCH ULTRASOFT) lancets Use to test blood sugar 3 times daily or as directed. 100 each PRN      order for DME Equipment being ordered: lift recliner 1 Device 0      order for DME Equipment being ordered: Hospital Bed with side rails 1 each 0 Taking     order for DME Equipment being ordered: Power Wheel Chair 1 Device 0 Taking     order for DME Equipment being ordered: bandage tape, prefer paper 1 Package 0 Taking     order for DME Full electric hospital bed with half rails    Dx: V74441, I110, J449  Length of need: lifetime 1 Device 0 Taking     order for DME Wheel Chair Cushion: 18 x 18 inch Roho cushion 1 Device 0 Taking     order for DME Hospital bed with side rails 1 Device 0 Taking     order for DME Equipment being ordered: CPAP supplies mask, hose, filters, cushion    fax to [facility] at [phone] 10 Device prn Taking     order for DME Equipment being ordered: CPAP supplies mask, hose, filters, cushion fax to [facility] at [phone]  Disp: 10 Device Refills: prn   Class: Local Print Start: 2/10/2017 1 Device 0 Taking     order for DME Equipment being ordered: Nebulizer and tubing supplies 1 Units 0 Taking     order for DME Equipment being ordered: Glucerna daily shakes with each meal 1 Box 11 Taking     ORDER FOR DME Equipment being ordered: Power  "Wheelchair 1 Device 0 Taking     ORDER FOR DME Equipment being ordered: Depends briefs 1 Month 11 Taking             Review of Systems:     A complete review of systems was performed and is negative except as noted in the HPI           Physical Exam:   /54 (BP Location: Left arm)   Pulse 65   Temp 98.1  F (36.7  C) (Oral)   Resp 16   Ht 1.6 m (5' 3\")   Wt 63.8 kg (140 lb 9.6 oz)   SpO2 93%   BMI 24.91 kg/m    Wt:   Wt Readings from Last 2 Encounters:   06/14/19 63.8 kg (140 lb 9.6 oz)   05/29/19 62.1 kg (137 lb)      Constitutional: cooperative, pleasant, n   Eyes: Sclera anicteric   Ears/nose/mouth/throat: edentulous  Neck: supple,    CV: RRR  Respiratory: Unlabored breathing  Lymph: No axillary, submandibular, supraclavicular or inguinal lymphadenopathy  Abdomen: soft, generalized tenderness, nondistended, +bs, no hepatosplenomegaly, no peritoneal signs  Rectal exam: brown stool  Skin: warm, perfused, no jaundice  Neuro: AAO x 3,   Psych: Normal affect   MSK: In bed         Data:   Labs and imaging below were independently reviewed and interpreted    BMP  Recent Labs   Lab 06/15/19  0541 06/14/19  1713    140   POTASSIUM 3.4 3.5   CHLORIDE 108 104   CAROL 8.8 9.5   CO2 27 26   BUN 14 14   CR 0.52 0.61   GLC 70 85     CBC  Recent Labs   Lab 06/15/19  1234 06/15/19  0541  06/14/19  1713   WBC 9.4 9.8  --  15.1*   RBC 3.92 3.89  --  4.04   HGB 11.1* 11.0*   < > 11.6*   HCT 37.1 35.6  --  36.7   MCV 95 92  --  91   MCH 28.3 28.3  --  28.7   MCHC 29.9* 30.9*  --  31.6   RDW 16.3* 16.5*  --  16.5*    308  --  411    < > = values in this interval not displayed.     INR  Recent Labs   Lab 06/14/19  1713   INR 1.10     LFTs  Recent Labs   Lab 06/15/19  0541 06/14/19  1713 06/13/19  1025   ALKPHOS 168* 173* 192*   AST 24 31 38   ALT 37 48 52*   BILITOTAL 0.2 0.2 0.1*   PROTTOTAL 7.5 8.2 8.5   ALBUMIN 3.0* 3.3* 3.3*      PANCNo lab results found in last 7 days.     "

## 2019-06-15 NOTE — PROGRESS NOTES
"Observation Goals:  Prior to discharge    -diagnostic tests and consults completed and resulted - YES, GI in to see patient, rectal exam with no black stool. No EGD planned.   Patient tolerated clear liquids (apple juice), advance to regular diet and ordered. Will recheck glucose prior to eating.     -vital signs normal or at patient baseline - Monitoring,- Last Hgb was 11.8  /66   Pulse 65   Temp 98.4  F (36.9  C) (Oral)   Resp 16   Ht 1.6 m (5' 3\")   Wt 63.8 kg (140 lb 9.6 oz)   SpO2 95%   BMI 24.91 kg/m      -safe disposition plan has been identified - Pending , Patient is from Assisted Living facility. SW has w/c ride arranged for tomorrow at 3:15 pm with [facility].    Nurse to notify provider when observation goals have been met and patient is ready for discharge      "

## 2019-06-15 NOTE — PHARMACY-ADMISSION MEDICATION HISTORY
Admission medication history interview status for the 6/14/2019 admission is complete. See Epic admission navigator for allergy information, pharmacy, prior to admission medications and immunization status.     Medication history interview sources:  Move In Record from [facility] [phone], and previous dispense records.    Changes made to PTA medication list (reason)  Added:   - None  Deleted:   - None  Changed:   - None    Additional medication history information (including reliability of information, actions taken by pharmacist):  All information was collected from Patient's Move In Record paperwork from [facility], [facility].  Previous dosing times were previously documented by nursing staff upon admission to the hospital, all other active medications were listed with last dosing time as unknown.    - Alendronate: According to assisted [facility] records, Patient takes this medication once weekly on Mondays.  - Nicotine 24 hour patch:  [facility] records did not indicate a patch strength.  According to previous dispense reports, Patient was dispensed the 14 mg strength on 5/30/2019.  Unknown if she has a patch in place now.  - Umeclidinium bromide inhaler:  [facility] paperwork did not indicate inhaler medication dose in their records.  According to previous dispense reports, Patient has been receiving the 62.5 mcg/act strength, with a recent dispense also on 5/30/2019.      Prior to Admission medications    Medication Sig Last Dose Taking? Auth Provider   ACETAMINOPHEN PO Take 1,000 mg by mouth every 6 hours as needed for pain Not to exceed 4000 mg/day Unknown Yes Unknown, Entered By History   ADVAIR DISKUS 250-50 MCG/DOSE diskus inhaler Inhale 1 puff into the lungs 2 times daily 6/14/2019 at AM Yes Alina Jennings MD   albuterol (PROVENTIL) (2.5 MG/3ML) 0.083% neb solution INHALE 1 VIAL VIA NEBULIZER EVERY 6 HOURS AS NEEDED Unknown Yes Jacinto  Allan SIMON MD   alendronate (FOSAMAX) 70 MG tablet Take 1 tablet (70 mg) by mouth with 8oz water every 7 days 30 minutes before breakfast and remain upright during this time. 6/10/2019 at AM Yes Allan Casey MD   aspirin EC 81 MG EC tablet Take 1 tablet (81 mg) by mouth daily 6/14/2019 at AM Yes Daria Mancilla MD   atorvastatin (LIPITOR) 40 MG tablet TAKE 1 TAB BY MOUTH ONCE DAILY 6/14/2019 at AM Yes Allan Casey MD   brimonidine (ALPHAGAN) 0.2 % ophthalmic solution Place 1 drop into the right eye 2 times daily 6/14/2019 at AM Yes Marely Robin MD   cetirizine (ZYRTEC) 10 MG tablet Take 1 tablet (10 mg) by mouth every evening Unknown Yes Vida Sanchez PA-C   Cholecalciferol (VITAMIN D) 2000 UNITS tablet Take 2,000 Units by mouth daily. Unknown Yes Reported, Patient   clotrimazole (LOTRIMIN) 1 % cream INSERT 1 APPLICATORFUL VAGINALLY TWICE A DAY FOR VAGINAL PAIN Unknown Yes Allan Casey MD   diclofenac (VOLTAREN) 1 % GEL topical gel Apply 2 g topically 4 times daily to hands Unknown Yes Allan Casey MD   dorzolamide (TRUSOPT) 2 % ophthalmic solution Place 1 drop into the right eye 2 times daily 6/14/2019 at AM Yes Marely Robin MD   EPINEPHrine (EPIPEN/ADRENACLICK/OR ANY BX GENERIC EQUIV) 0.3 MG/0.3ML injection 2-pack Inject 0.3 mLs (0.3 mg) into the muscle as needed for anaphylaxis Unknown Yes Allan Casey MD   escitalopram (LEXAPRO) 10 MG tablet TAKE 1 TAB BY MOUTH ONCE DAILY 6/13/2019 at PM Yes Allan Casey MD   ferrous sulfate (IRON) 325 (65 FE) MG tablet Take 1 tablet (325 mg) by mouth 2 times daily With meals 6/14/2019 at AM Yes Allan Casey MD   fluticasone (FLONASE) 50 MCG/ACT nasal spray Spray 1 spray into both nostrils daily Unknown Yes Reported, Patient   lactulose (CHRONULAC) 10 GM/15ML solution Take 20 g by mouth as needed for constipation Unknown Yes Unknown, Entered By History   latanoprost (XALATAN) 0.005 % ophthalmic solution Place 1 drop  into both eyes At Bedtime 6/13/2019 at PM Yes Marely Robin MD   levETIRAcetam (KEPPRA) 500 MG tablet TAKE 1 TAB BY MOUTH TWICE A DAY 6/13/2019 at PM Yes Allan Casey MD   lisinopril (PRINIVIL/ZESTRIL) 20 MG tablet TAKE 1 TAB BY MOUTH ONCE DAILY 6/14/2019 at AM Yes Allan Casey MD   metFORMIN (GLUCOPHAGE) 500 MG tablet TAKE 1 TAB BY MOUTH IN THE EVENING WITH DINNER 6/13/2019 at PM Yes Allan Casey MD   metoprolol succinate ER (TOPROL-XL) 50 MG 24 hr tablet TAKE 1 TAB BY MOUTH ONCE DAILY 6/14/2019 at AM Yes Allan Casey MD   nicotine (NICODERM CQ) 14 MG/24HR 24 hr patch Place 1 patch onto the skin every 24 hours Unknown Yes Kirk Aviles MD   nitroglycerin (NITROSTAT) 0.4 MG SL tablet Place 1 tablet (0.4 mg) under the tongue every 5 minutes as needed for chest pain if you are still having symptoms after 3 doses (15 minutes) call 911. Unknown Yes Allan Casey MD   ondansetron (ZOFRAN-ODT) 8 MG ODT tab Take 1 tablet (8 mg) by mouth every 8 hours as needed Unknown Yes Allan Casey MD   pantoprazole (PROTONIX) 40 MG EC tablet TAKE 1 TAB BY MOUTH ONCE DAILY 6/14/2019 at AM Yes Allan Casey MD   phenytoin (DILANTIN) 100 MG capsule TAKE 2 CAPS (200MG) BY MOUTH TWICE A DAY 6/14/2019 at AM Yes Allan Casey MD   polyethylene glycol (MIRALAX/GLYCOLAX) Packet Take 17 g by mouth daily Dissolved in water or juice  Unknown Yes Unknown, Entered By History   pregabalin (LYRICA) 75 MG capsule Take 150 mg by mouth 3 times daily 6/14/2019 at Noon Yes Unknown, Entered By History   risperiDONE (RISPERDAL) 0.5 MG tablet TAKE 1 TAB BY MOUTH AT BEDTIME 6/13/2019 at PM Yes Allan Casey MD   sennosides (SENOKOT) 8.6 MG tablet Take 1 tablet by mouth 2 times daily  Unknown Yes Reported, Patient   solifenacin (VESICARE) 10 MG tablet TAKE 1 TAB BY MOUTH ONCE DAILY Unknown Yes Allan Casey MD   sucralfate (CARAFATE) 1 GM tablet TAKE 1 TAB BY MOUTH FOUR TIMES DAILY. MAY DISSOLVE IN 10ML WATER  ISNECESSARY 6/14/2019 at Noon Yes Allan Casey MD   traZODone (DESYREL) 150 MG tablet TAKE 1 TAB BY MOUTH AT BEDTIME 6/13/2019 at PM Yes Allan Casey MD   umeclidinium (INCRUSE ELLIPTA) 62.5 MCG/INH inhaler Inhale 1 puff into the lungs daily Unknown Yes Kirk Aviles MD   VENTOLIN  (90 Base) MCG/ACT inhaler Inhale 2 puffs into the lungs every 6 hours as needed for shortness of breath / dyspnea or wheezing Unknown Yes Allan Casey MD   blood glucose monitoring (ONE TOUCH ULTRA 2) meter device kit Use to test blood sugars 3 times daily or as directed.   Allan Casey MD   blood glucose monitoring (ONE TOUCH ULTRA) test strip Use to test blood sugars 3 times daily or as directed.   Allan Casey MD   blood glucose monitoring (ONE TOUCH ULTRASOFT) lancets Use to test blood sugar 3 times daily or as directed.   Allan Casey MD   order for DME Equipment being ordered: lift recliner   Allan Casey MD   order for DME Equipment being ordered: Hospital Bed with side rails   Allan Casey MD   order for DME Equipment being ordered: Power Wheel Chair   Allan Casey MD   order for DME Equipment being ordered: bandage tape, prefer paper   Allan Casey MD   order for DME Full electric hospital bed with half rails    Dx: Y51386, I110, J449  Length of need: lifetime   Allan Casey MD   order for DME Wheel Chair Cushion: 18 x 18 inch Roho cushion   Allan Casey MD   order for DME Hospital bed with side rails   Allan Casey MD   order for DME Equipment being ordered: CPAP supplies mask, hose, filters, cushion    fax to [facility] at [phone]   Allan Casey MD   order for DME Equipment being ordered: CPAP supplies mask, hose, filters, cushion fax to [facility] at [phone]  Disp: 10 Device Refills: prn   Class: Local Print Start: 2/10/2017   Allan Casey MD   order for DME Equipment being ordered: Nebulizer and tubing supplies   Alina Jennings  MD Ying   order for DME Equipment being ordered: Glucerna daily shakes with each meal   Allan Casey MD   ORDER FOR DME Equipment being ordered: Power Wheelchair   Allan Casey MD   ORDER FOR DME Equipment being ordered: Depends briefs   Allan Casey MD         Medication history completed by: Terri Lockett, PD2 Student

## 2019-06-15 NOTE — PROGRESS NOTES
[facility]  Daily Progress Note          Assessment & Plan:   Sonya Foote is a 69 year old female with history notable for COPD, diastolic CHF, DM 2, HTN, HLD, GERD, and bilateral AKA who presents to the ED for upper abdominal pain and a couple of days of black tarry stools, as well as malodorous urine from her suprapubic catheter and some nausea and vomiting for the last 2-3 days. Also reports, ~ 1 week of black tarry stools.      1. GI Bleed, Epigastric Pain: She reports a bleeding ulcer 15yrs ago and also a history of hemorrhoids.  HD stable, afebrile. Hgb 11.6--> 11.8 --> 11.0.  Baseline hgb 12-13.  LFTs's stable. INR 1.1. Abdomen is tender to palpation to epigastric region. UA is catheterized urine from suprapubic catheter, large LE but only 4 WBC. Culture pending. This am she has ongoing upper abdominal pain. Does take iron. Black stools could be related to iron tablets versus upper GI bleed. S/p cholecystomy.    -GI consult  -VS Q4hrs  -I/Os  -Trend Hgb Q6hrs  -IVF at 100ml/hr  -Protonix IV BID, carafate   -NPO  - send type and screen      2. Nausea, vomiting: No vomiting since presentation to the ED. WBC 15.1 --> 9.8  -Zofran PRN  -IVF at 100 ml/hr  -Follow urine culture  -Monitor for fever     Chronic Medical Problems  ##Anemia: 11.0 baseline 12-13, typically takes ferrous sulfate but will hold for now.      ##DM2: Hold metformin due to NPO status. Monitor glucose. Last A1c in April, it was 5.0.     ##COPD: Hold Ventolin inhaler, hold home Albuterol neb.   -DuoNeb 4x daily while she is here.      ##Schizophrenia: Continue PTA risperdal and lexapro     ##Hx of Seizures: Seizure precautions ordered. Pt reports grand mal 15 years ago, petit mal seizures on a daily basis. Continue PTA Keppra, PTA Dilantin     ##PVD   ##Bilateral Above Knee Amputation: Nov 2016 2/2 DM and PVD. Pt uses electronic wheelchair and lives in [facility] in [place]. Care  "coordinator consult placed for dispo planning.     ##HTN: continue PTA lisinopril and toprol     ##Hx of CVA: reported residual left sided weakness, although unable to appreciate. Pt is generally weak and but symmetric.      ##Blind in left eye: Also has limited poor vision in right eye.     ##Tobacco Abuse: 75 pack year history.   -Nicotine patch      ##GERD: continue PTA Protonix, but change to IV and give BID     ##Incontinence: Suprapubic catheter in place. History of UTIs with this. Continue PTA Detrol LA.   -Urine culture pending, follow      ##Chronic Pain: Pt reports she has a pain pump in her back that delivers Fentanyl but \"it hasn't been working\" she is following with [facility] and is to see them Monday for imaging and clinic visit. She has been off of narcotics for pain for several months now.     FEN: NPO  Lines: PIV  Prophylaxis: Early ambulation           Consults:   GI         Discharge Planning:   Pending GI consult        Interval History:   Ongoing epigastric pain. Denies vomiting.       ROS:   Constitutional: No fevers/chills.   Cardiovascular: No chest pain or palpitations.   Respiratory: No cough or SOB.   Musculoskeletal: Denies pain.           Physical Exam:   /66   Pulse 65   Temp 98.4  F (36.9  C) (Oral)   Resp 16   Ht 1.6 m (5' 3\")   Wt 63.8 kg (140 lb 9.6 oz)   SpO2 93%   BMI 24.91 kg/m       GENERAL: Alert and oriented x 3. NAD.   HEENT: Anicteric sclera. Mucous membranes moist.   CV: RRR. S1, S2. No murmurs appreciated.   RESPIRATORY: Effort normal. Lungs CTAB with no wheezing, rales, rhonchi.   GI: Abdomen soft, TTP to epigastric region, RUQ, mild TTP to LUQ. Non distended with normoactive bowel sounds present in all quadrants. No rebound, guarding.   NEUROLOGICAL: No focal deficits. Moves all extremities.    EXTREMITIES: Bilateral AKA   SKIN: No jaundice. No rashes.     Medication list reviewed.   Today's labs and imaging were reviewed.     VICTOR M Bernal, " CNP  Emergency Department Observation Unit      Addendum 1200: Per GI, rectal exam with brown stool. Recommend iron panel. ADAT. Assess later this afternoon. If tolerating diet and feels back to baseline can possible discharge later today.     VICTOR M Bernal, CNP  Emergency Department Observation Unit    Addendum 1500: Patient continues to feel unwell with abdominal pain. Will plan to monitor overnight, anticipate discharge in the am .    VICTOR M Bernal, CNP  Emergency Department Observation Unit

## 2019-06-15 NOTE — PROGRESS NOTES
"Patient with multiple comorbidities who was admitted due to epigastric pain, nausea vomiting, and dark tarry stools.  In addition was found to have malodorous urine and possible urinary tract with subjective fever and chills.    /54 (BP Location: Left arm)   Pulse 65   Temp 98.1  F (36.7  C) (Oral)   Resp 16   Ht 1.6 m (5' 3\")   Wt 63.8 kg (140 lb 9.6 oz)   SpO2 96%   BMI 24.91 kg/m      EXAM:  HEENT: Normal.  Oropharynx clear and moist.  Neck: Supple, trachea midline, normal voice  Chest:  No respiratory distress, speaks in complete sentences, chest wall nontender, lungs clear in all fields  CV: Regular rate and rhythm, no murmur, normal pulse, no jugular venous distention  Abdomen: Nondistended, bowel sounds are present.  There is epigastric and upper quadrant tenderness both left and right but no peritoneal signs..  Extremities: Patient has bilateral BKA's.    Assessment:  Patient with a remote history of peptic ulcer disease who was admitted due to upper abdominal pain with dark tarry stools.  Patient remaining with stable hemoglobin, nonsurgical exam, but still having difficulty tolerating taking p.o.    Plan:  GI consult  Continue PPI and PRN antiemetics  Serial abdominal exam  Advance diet as tolerated.    "

## 2019-06-15 NOTE — H&P
[facility], [place]    History and Physical - ED Observation       Date of Admission:  6/14/2019    Assessment & Plan   Sonya Foote is a 69 year old female with history notable for COPD, diastolic CHF, DM 2, HTN, HLD, GERD, and bilateral AKA who presents to the ED for upper abdominal pain and a couple of days of black tarry stools, as well as malodorous urine from her suprapubic catheter and some nausea and vomiting for the last 2-3 days. Pt reports about a week of black tarry stools. She reports a bleeding ulcer 15yrs ago and also a history of hemorrhoids.  In the ED, Hgb 11.6, baseline 12-13, hematocrit 36.7. Other labs: Na 140, K+ 3.5, Cr 0.61, BUN 14. Alk phos 173, ALT 48, AST 31, Bili total 0.2. INR 1.1. WBC 15.1, Platelets 411. Abdomen is tender to palpation in epigastric region. UA is catheterized urine from suprapubic catheter, large LE but only 4 WBC. Culture pending. /76, HR 67, temp 97.7, RR 16, SPO2 100% on RA. Plan for admission to ED Obs for monitoring of abdomen and possible GI Bleed.     1. GI Bleed:   -Admit to ED Obs  -VS Q4hrs  -I/Os  -Trend Hgb Q6hrs  -Repeat CMP and CBC in AM  -IVF at 100ml/hr  -Protonix IV daily    2. Nausea, vomiting: No vomiting since this morning, prior to coming to ED. WBC 15.1, will follow.   -Zofran PRN  -IVF at 100 ml/hr  -Follow urine culture  -Repeat CBC in AM  -Monitor for fever    Chronic Medical Problems  ##Anemia: Hgb 11.6, baseline 12-13, typically takes ferrous sulfate but will hold for tonight.     ##DM2: Hold metformin due to NPO status. Monitor glucose. Last A1c in April, it was 5.0.    ##COPD: Hold Ventolin inhaler, hold home Albuterol neb. Will order DuoNeb 4x daily while she is here.     ##Schizophrenia: Continue PTA risperdal and lexapro    ##Hx of Seizures: Seizure precautions ordered. Pt reports grand mal 15 years ago, petit mal seizures on a daily basis. Continue PTA Keppra, PTA Dilantin    ##PVD   ##Bilateral Above  "Knee Amputation: Nov 2016 2/2 DM and PVD. Pt uses electronic wheelchair and lives in [facility] in [place]. Care coordinator consult placed for dispo planning.    ##HTN: continue PTA lisinopril and toprol    ##Hx of CVA: reported residual left sided weakness, although this provider was unable to appreciate. Pt is generally weak and tired but symmetric.     ##Blind in left eye: Also has limited poor vision in right eye.    ##Tobacco Abuse: 75 pack year history. Pt has nicotine patch on right arm now that was placed yesterday. Will continue this while she is with us.     ##GERD: continue PTA Protonix    ##Incontinence: Suprapubic catheter in place. History of UTIs with this. Continue PTA Detrol LA. Urine culture pending.     ##Chronic Pain: Pt reports she has a pain pump in her back that delivers Fentanyl but \"it hasn't been working\" she is following with [facility] and is to see them Monday for imaging and clinic visit. She has been off of narcotics for pain for several months now.      Diet: NPO  DVT Prophylaxis: Low Risk/Ambulatory with no VTE prophylaxis indicated  Landrum Catheter: not present  Code Status: full    Disposition Plan   Expected discharge: Tomorrow, recommended to prior living arrangement once adequate pain management/ tolerating PO medications and hemoglobin stable.  Entered: Deandra Mcbride CNP 06/14/2019, 7:42 PM     The patient's care was discussed with the Attending Physician, Dr. Nevin Wagner.    Deandra Mcbride CNP  [facility], [place]  ED Observation, 6D Ascom: [id number]  ______________________________________________________________________    Chief Complaint   Black tarry stools, abdominal pain, nausea and vomiting    History is obtained from the patient and review of the medical record    History of Present Illness   Per ED,\"Sonya Foote is a 69 year old female with history notable for COPD, diastolic CHF, DM 2, HTN, HLD, GERD, and right BKA " "who presents to the ED for multiple complaints.  Patient states that she has upper abdominal pain and a couple of days of black tarry stools.  She reports that she has previous history of hemorrhoids.  She also complains of vomiting, dysuria, and noted some malodorous urine.  She also reports that she has some subjective fevers and chills last night.  She otherwise denies taking anticoagulants currently besides baby aspirin, hematemesis, or diarrhea.\"    Pt was stable on arrival to ED Observation Unit.     Review of Systems    Consitutional: Positive for chills, negative for fever  GI: Positive for abdominal pain, blood in stool (melenic), and vomiting. Negative for diarrhea.  : Positive for dysuria  All other systems reviewed and are negative.    Past Medical History    I have reviewed this patient's medical history and updated it with pertinent information if needed.   Past Medical History:   Diagnosis Date     Anemia      Antiplatelet or antithrombotic long-term use      Arthritis      AS (sickle cell trait) (H) 10/8/2013     Blind left eye      BPPV (benign paroxysmal positional vertigo)      Chronic back pain      COPD (chronic obstructive pulmonary disease) (H)      Coronary artery disease      CVA (cerebral infarction) 10/8/2012    x3, residual left sided weakness     Diastolic CHF (H) 9/4/2012     Dilantin toxicity 5/31/2013     Esophageal candidiasis (H)      GERD (gastroesophageal reflux disease)      Heart attack (H)      Hernia, abdominal      History of blood transfusion     x5     History of thrombophlebitis      HTN (hypertension) 9/4/2012     Hyperlipidemia LDL goal <100 9/4/2012     Legally blind in right eye, as defined in [place]     No periphal vision right eye     Osteoporosis 1/21/2013     Other chronic pain      PAD (peripheral artery disease) (H)      Palpitations      Person who has had sex change operation 1/20/2014     Pneumonia      Schizoaffective disorder (H)      Seizure disorder (H) " 9/4/2012     Sleep apnea     CPAP     Thrombosis of leg      Type 2 diabetes, HbA1C goal < 8% (H) 9/4/2012     Uncomplicated asthma      Unspecified cerebral artery occlusion with cerebral infarction        Past Surgical History   I have reviewed this patient's surgical history and updated it with pertinent information if needed.  Past Surgical History:   Procedure Laterality Date     AMPUTATE LEG ABOVE KNEE Left 6/11/2016    Procedure: AMPUTATE LEG ABOVE KNEE;  Surgeon: Mello Rodriguez MD;  [facility]     AMPUTATE LEG BELOW KNEE Right 11/7/2016    Procedure: AMPUTATE LEG BELOW KNEE;  Surgeon: Savannah Durant MD;  [facility]     AMPUTATE REVISION STUMP LOWER EXTREMITY Right 11/11/2016    Procedure: AMPUTATE REVISION STUMP LOWER EXTREMITY;  Surgeon: Savannah Durant MD;  [facility]     AMPUTATE REVISION STUMP LOWER EXTREMITY Right 11/16/2016    Procedure: AMPUTATE REVISION STUMP LOWER EXTREMITY;  Surgeon: Savannah Durant MD;  [facility]     AMPUTATE TOE(S) Right 1/5/2016    Procedure: AMPUTATE TOE(S);  Surgeon: Mello Gaines DPM;  Location:  [facility]     ANGIOGRAM Bilateral 11/21/2014    Procedure: ANGIOGRAM;  Surgeon: Savannah Durant MD;  [facility]     ANGIOGRAM Left 1/16/2015    Procedure: ANGIOGRAM;  Surgeon: Savannah Durant MD;  [facility]     ANGIOGRAM Bilateral 9/14/2015    Procedure: ANGIOGRAM;  Surgeon: Savannah Durant MD;  [facility]     ANGIOGRAM Left 10/12/2015    Procedure: ANGIOGRAM;  Surgeon: Savannah Durant MD;  [facility]     ANGIOGRAM Right 6/6/2016    Procedure: ANGIOGRAM;  Surgeon: Savannah Durant MD;  [facility]     ANGIOPLASTY Right 6/6/2016    Procedure: ANGIOPLASTY;  Surgeon: Savannah Durant MD;  [facility]     APPENDECTOMY       BREAST SURGERY      right breast bx (benign)     CATARACT IOL, RT/LT Right      CHOLECYSTECTOMY       COLONOSCOPY N/A 8/25/2014    Procedure: COLONOSCOPY;  Surgeon: Mello Ferrer MD;  [facility]     COLONOSCOPY WITH CO2  INSUFFLATION N/A 8/20/2014    Procedure: COLONOSCOPY WITH CO2 INSUFFLATION;  Surgeon: Duane, William Charles, MD;  [facility]     CYSTOSTOMY, INSERT TUBE SUPRAPUBIC, COMBINED N/A 1/16/2018    Procedure: COMBINED CYSTOSTOMY, INSERT TUBE SUPRAPUBIC;  Cystoscopy, Intraoperative Ultrasound, Suprapubic Tube Placement;  Surgeon: Keanu Dawson MD;  [facility]     ENDARTERECTOMY FEMORAL  5/23/2014    Procedure: ENDARTERECTOMY FEMORAL;  Surgeon: Jason Joshi MD;  [facility]     ESOPHAGOSCOPY, GASTROSCOPY, DUODENOSCOPY (EGD), COMBINED  12/14/2012    Procedure: COMBINED ESOPHAGOSCOPY, GASTROSCOPY, DUODENOSCOPY (EGD), BIOPSY SINGLE OR MULTIPLE;  ESOPHAGOSCOPY, GASTROSCOPY, DUODENOSCOPY (EGD), DILATATION ;  Surgeon: Elizabeth Stevenson MD;  Location:  GI     ESOPHAGOSCOPY, GASTROSCOPY, DUODENOSCOPY (EGD), COMBINED  12/31/2013    Procedure: COMBINED ESOPHAGOSCOPY, GASTROSCOPY, DUODENOSCOPY (EGD), BIOPSY SINGLE OR MULTIPLE;;  Surgeon: Clemente Lopez MD;  Location:  GI     ESOPHAGOSCOPY, GASTROSCOPY, DUODENOSCOPY (EGD), COMBINED  4/1/2014    Procedure: COMBINED ESOPHAGOSCOPY, GASTROSCOPY, DUODENOSCOPY (EGD);;  Surgeon: Clemente Lopez MD;  Location:  GI     ESOPHAGOSCOPY, GASTROSCOPY, DUODENOSCOPY (EGD), COMBINED  6/28/2014    Procedure: COMBINED ESOPHAGOSCOPY, GASTROSCOPY, DUODENOSCOPY (EGD);  Surgeon: Clemente Lopez MD;  [facility]     ESOPHAGOSCOPY, GASTROSCOPY, DUODENOSCOPY (EGD), COMBINED N/A 8/20/2014    Procedure: COMBINED ESOPHAGOSCOPY, GASTROSCOPY, DUODENOSCOPY (EGD), BIOPSY SINGLE OR MULTIPLE;  Surgeon: Duane, William Charles, MD;  [facility]     ESOPHAGOSCOPY, GASTROSCOPY, DUODENOSCOPY (EGD), COMBINED N/A 8/22/2014    Procedure: COMBINED ESOPHAGOSCOPY, GASTROSCOPY, DUODENOSCOPY (EGD), BIOPSY SINGLE OR MULTIPLE;  Surgeon: Mello Ferrer MD;  [facility]     ESOPHAGOSCOPY, GASTROSCOPY, DUODENOSCOPY (EGD), COMBINED N/A 10/2/2014    Procedure: COMBINED ESOPHAGOSCOPY,  GASTROSCOPY, DUODENOSCOPY (EGD), BIOPSY SINGLE OR MULTIPLE;  Surgeon: Remy Haskins MD;  [facility]     ESOPHAGOSCOPY, GASTROSCOPY, DUODENOSCOPY (EGD), COMBINED Left 12/15/2014    Procedure: COMBINED ESOPHAGOSCOPY, GASTROSCOPY, DUODENOSCOPY (EGD), BIOPSY SINGLE OR MULTIPLE;  Surgeon: Remy Hsakins MD;  [facility]     ESOPHAGOSCOPY, GASTROSCOPY, DUODENOSCOPY (EGD), COMBINED N/A 2/25/2015    Procedure: COMBINED ENDOSCOPIC ULTRASOUND, ESOPHAGOSCOPY, GASTROSCOPY, DUODENOSCOPY (EGD), FINE NEEDLE ASPIRATE/BIOPSY;  Surgeon: Clemente Lugo MD;  [facility]     ESOPHAGOSCOPY, GASTROSCOPY, DUODENOSCOPY (EGD), COMBINED Left 2/25/2015    Procedure: COMBINED ESOPHAGOSCOPY, GASTROSCOPY, DUODENOSCOPY (EGD), BIOPSY SINGLE OR MULTIPLE;  Surgeon: Clemente Lugo MD;  [facility]     ESOPHAGOSCOPY, GASTROSCOPY, DUODENOSCOPY (EGD), COMBINED N/A 9/25/2016    Procedure: COMBINED ESOPHAGOSCOPY, GASTROSCOPY, DUODENOSCOPY (EGD);  Surgeon: Aziza Patiño MD;  [facility]     ESOPHAGOSCOPY, GASTROSCOPY, DUODENOSCOPY (EGD), COMBINED N/A 1/18/2017    Procedure: COMBINED ESOPHAGOSCOPY, GASTROSCOPY, DUODENOSCOPY (EGD), BIOPSY SINGLE OR MULTIPLE;  Surgeon: Clemente Lopez MD;  [facility]     ESOPHAGOSCOPY, GASTROSCOPY, DUODENOSCOPY (EGD), COMBINED N/A 11/26/2017    Procedure: COMBINED ESOPHAGOSCOPY, GASTROSCOPY, DUODENOSCOPY (EGD), REMOVE FOREIGN BODY;  Esophagogastroduodenoscopy with foreign body extraction  ;  Surgeon: Herberth Castrejon MD;  [facility]     ESOPHAGOSCOPY, GASTROSCOPY, DUODENOSCOPY (EGD), COMBINED N/A 11/26/2017    Procedure: COMBINED ESOPHAGOSCOPY, GASTROSCOPY, DUODENOSCOPY (EGD), REMOVE FOREIGN BODY;;  Surgeon: Herberth Castrejon MD;  [facility]     FASCIOTOMY LOWER EXTREMITY Left 6/10/2016    Procedure: FASCIOTOMY LOWER EXTREMITY;  Surgeon: Mello Rodriguez MD;  [facility]      CAPSULE ENDOSCOPY N/A 8/25/2014    Procedure: CAPSULE/PILL CAM  ENDOSCOPY;  Surgeon: Remy Haskins MD;  Location:  GI     HC CAPSULE ENDOSCOPY N/A 10/2/2014    Procedure: CAPSULE/PILL CAM ENDOSCOPY;  Surgeon: Remy Haskins MD;  Location:  GI     ORTHOPEDIC SURGERY      broken wrist repair     SEX TRANSFORMATION SURGERY, MALE TO FEMALE      1974     SINUS SURGERY      cyst removed     TONSILLECTOMY       VASCULAR SURGERY      Left carotid stent       Social History   I have reviewed this patient's social history and updated it with pertinent information if needed.  Social History     Tobacco Use     Smoking status: Current Every Day Smoker     Packs/day: 0.25     Years: 30.00     Pack years: 7.50     Types: Cigarettes, Cigars     Smokeless tobacco: Never Used   Substance Use Topics     Alcohol use: No     Alcohol/week: 0.0 oz     Drug use: No       Family History   I have reviewed this patient's family history and updated it with pertinent information if needed.   Family History   Problem Relation Age of Onset     Dementia Mother      Glaucoma Mother      Diabetes Mother         may have been type 1, childhood     Coronary Artery Disease Mother         MI     Glaucoma Father      Diabetes Father         may hev been type 1 - ??     Heart Failure Father      Cancer Maternal Aunt         leukemia     Schizophrenia Brother      Depression Brother      Suicide Sister      Depression Sister      Diabetes Sister      Cancer Maternal Aunt         ovarian     Glaucoma Maternal Grandmother      Diabetes Maternal Grandmother      Glaucoma Maternal Grandfather      Diabetes Maternal Grandfather      Glaucoma Paternal Grandmother      Diabetes Paternal Grandmother      Glaucoma Paternal Grandfather      Diabetes Paternal Grandfather      Breast Cancer Sister      Cerebrovascular Disease Brother      Colon Cancer No family hx of      Macular Degeneration No family hx of        Prior to Admission Medications   Prior to Admission Medications   Prescriptions Last Dose  Informant Patient Reported? Taking?   ACETAMINOPHEN PO  Nursing Home Yes No   Sig: Take 1,000 mg by mouth every 6 hours as needed for pain Not to exceed 4000 mg/day   ADVAIR DISKUS 250-50 MCG/DOSE diskus inhaler  Nursing Home No No   Sig: Inhale 1 puff into the lungs 2 times daily   CYANOCOBALAMIN PO  Nursing Home Yes No   Sig: Take 2,000 mcg by mouth daily   Cholecalciferol (VITAMIN D) 2000 UNITS tablet  Nursing Home Yes No   Sig: Take 2,000 Units by mouth daily.   EPINEPHrine (EPIPEN/ADRENACLICK/OR ANY BX GENERIC EQUIV) 0.3 MG/0.3ML injection 2-pack  Nursing Home No No   Sig: Inject 0.3 mLs (0.3 mg) into the muscle as needed for anaphylaxis   Multiple Vitamin (MULTIVITAMIN OR)  Nursing Home Yes No   Sig: Take 1 tablet by mouth daily    ORDER FOR DME  Nursing Home No No   Sig: Equipment being ordered: Depends briefs   [place] [facility] [place] No No   Sig: Equipment being ordered: Power Wheelchair   VENTOLIN  (90 Base) MCG/ACT inhaler   No No   Sig: Inhale 2 puffs into the lungs every 6 hours as needed for shortness of breath / dyspnea or wheezing   albuterol (PROVENTIL) (2.5 MG/3ML) 0.083% neb solution   No No   Sig: INHALE 1 VIAL VIA NEBULIZER EVERY 6 HOURS AS NEEDED   alendronate (FOSAMAX) 70 MG tablet   No No   Sig: Take 1 tablet (70 mg) by mouth with 8oz water every 7 days 30 minutes before breakfast and remain upright during this time.   aspirin EC 81 MG EC tablet 6/14/2019 at AM Nursing Home No Yes   Sig: Take 1 tablet (81 mg) by mouth daily   atorvastatin (LIPITOR) 40 MG tablet 6/14/2019 at AM  No Yes   Sig: TAKE 1 TAB BY MOUTH ONCE DAILY   blood glucose monitoring (ONE TOUCH ULTRA 2) meter device kit  Nursing Home No No   Sig: Use to test blood sugars 3 times daily or as directed.   blood glucose monitoring (ONE TOUCH ULTRA) test strip  Nursing Home No No   Sig: Use to test blood sugars 3 times daily or as directed.   blood glucose monitoring (ONE TOUCH ULTRASOFT) lancets  Nursing Home No No    Sig: Use to test blood sugar 3 times daily or as directed.   brimonidine (ALPHAGAN) 0.2 % ophthalmic solution  Nursing Home No No   Sig: Place 1 drop into the right eye 2 times daily   cetirizine (ZYRTEC) 10 MG tablet   No No   Sig: Take 1 tablet (10 mg) by mouth every evening   clotrimazole (LOTRIMIN) 1 % cream   No No   Sig: INSERT 1 APPLICATORFUL VAGINALLY TWICE A DAY FOR VAGINAL PAIN   diclofenac (VOLTAREN) 1 % GEL topical gel   No No   Sig: Apply 2 g topically 4 times daily to hands   dorzolamide (TRUSOPT) 2 % ophthalmic solution  Nursing Home No No   Sig: Place 1 drop into the right eye 2 times daily   escitalopram (LEXAPRO) 10 MG tablet 6/13/2019 at PM  No Yes   Sig: TAKE 1 TAB BY MOUTH ONCE DAILY   ferrous sulfate (IRON) 325 (65 FE) MG tablet  Nursing Home No No   Sig: Take 1 tablet (325 mg) by mouth 2 times daily With meals   fluticasone (FLONASE) 50 MCG/ACT nasal spray  Nursing Home Yes No   Sig: Spray 1 spray into both nostrils daily   lactulose (CHRONULAC) 10 GM/15ML solution  Nursing Home Yes No   Sig: Take 20 g by mouth as needed for constipation   latanoprost (XALATAN) 0.005 % ophthalmic solution  Nursing Home No No   Sig: Place 1 drop into both eyes At Bedtime   levETIRAcetam (KEPPRA) 500 MG tablet 6/13/2019 at PM  No Yes   Sig: TAKE 1 TAB BY MOUTH TWICE A DAY   lisinopril (PRINIVIL/ZESTRIL) 20 MG tablet 6/14/2019 at AM  No Yes   Sig: TAKE 1 TAB BY MOUTH ONCE DAILY   metFORMIN (GLUCOPHAGE) 500 MG tablet 6/13/2019 at PM  No Yes   Sig: TAKE 1 TAB BY MOUTH IN THE EVENING WITH DINNER   metoprolol succinate ER (TOPROL-XL) 50 MG 24 hr tablet   No No   Sig: TAKE 1 TAB BY MOUTH ONCE DAILY   nicotine (NICODERM CQ) 14 MG/24HR 24 hr patch   No No   Sig: Place 1 patch onto the skin every 24 hours   nitroglycerin (NITROSTAT) 0.4 MG SL tablet  Nursing Home No No   Sig: Place 1 tablet (0.4 mg) under the tongue every 5 minutes as needed for chest pain if you are still having symptoms after 3 doses (15 minutes)  call 911.   ondansetron (ZOFRAN-ODT) 8 MG ODT tab  Nursing Home No No   Sig: Take 1 tablet (8 mg) by mouth every 8 hours as needed   order for [facility] [place]  Nursing Home No No   Sig: Equipment being ordered: Glucerna daily shakes with each meal   order for [facility] [place]  Nursing Home No No   Sig: Equipment being ordered: Nebulizer and tubing supplies   order for [facility] [place]  Nursing Home No No   Sig: Equipment being ordered: CPAP supplies mask, hose, filters, cushion    fax to [facility] at [phone]   order for [facility] [place]  Nursing Home No No   Sig: Equipment being ordered: CPAP supplies mask, hose, filters, cushion fax to [facility] at [phone]  Disp: 10 Device Refills: prn   Class: Local Print Start: 2/10/2017   order for [facility] [place]  Nursing Home No No   Sig: Hospital bed with side rails   order for [facility] [place]  Nursing Home No No   Sig: Full electric hospital bed with half rails    Dx: E72805, I110, J449  Length of need: lifetime   order for [facility] [place]  Nursing Home No No   Sig: Wheel Chair Cushion: 18 x 18 inch Roho cushion   order for [facility] [place]  Nursing Home No No   Sig: Equipment being ordered: bandage tape, prefer paper   order for DME   No No   Sig: Equipment being ordered: Power Wheel Chair   order for DME   No No   Sig: Equipment being ordered: Hospital Bed with side rails   order for DME   No No   Sig: Equipment being ordered: lift recliner   pantoprazole (PROTONIX) 40 MG EC tablet   No No   Sig: TAKE 1 TAB BY MOUTH ONCE DAILY   phenytoin (DILANTIN) 100 MG capsule   No No   Sig: TAKE 2 CAPS (200MG) BY MOUTH TWICE A DAY   polyethylene glycol (MIRALAX/GLYCOLAX) Packet  Nursing Home Yes No   Sig: Take 17 g by mouth daily Dissolved in water or juice    pregabalin (LYRICA) 150 MG capsule   No No   Sig: Take 1 capsule (150 mg) by mouth 2 times daily   risperiDONE (RISPERDAL) 0.5 MG tablet   No No   Sig: TAKE 1 TAB BY MOUTH AT BEDTIME   sennosides (SENOKOT) 8.6 MG tablet   Yes No   Sig: Take 1 tablet by mouth 2 times daily   solifenacin (VESICARE) 10 MG  tablet   No No   Sig: TAKE 1 TAB BY MOUTH ONCE DAILY   sucralfate (CARAFATE) 1 GM tablet   No No   Sig: TAKE 1 TAB BY MOUTH FOUR TIMES DAILY. MAY DISSOLVE IN 10ML WATER ISNECESSARY   traZODone (DESYREL) 150 MG tablet   No No   Sig: TAKE 1 TAB BY MOUTH AT BEDTIME   traZODone (DESYREL) 50 MG tablet  Nursing Home Yes No   Sig: Take 150 mg by mouth At Bedtime    umeclidinium (INCRUSE ELLIPTA) 62.5 MCG/INH inhaler   No No   Sig: Inhale 1 puff into the lungs daily   zinc 50 MG TABS  Nursing Home Yes No   Sig: Take 1 tablet by mouth daily      Facility-Administered Medications: None     Allergies   Allergies   Allergen Reactions     Bee Venom      Penicillins Anaphylaxis     Other reaction(s): Skin Rash and/or Hives     Dilantin [Phenytoin] Other (See Comments)     Generic dilantin only per pt     Iodide Hives     09/11/15: Pt states she can use iodine and doesn't have any problems      Iodine-131      Novocaine [Procaine] Hives     Other reaction(s): Skin Rash and/or Hives     Tositumomab        Physical Exam   Vital Signs: Temp: 98.3  F (36.8  C) Temp src: Oral BP: 124/65 Pulse: 67 Heart Rate: 68 Resp: 16 SpO2: 99 % O2 Device: None (Room air)    Weight: 130 lbs 0 oz    Constitutional: awake, alert, cooperative, no apparent distress, and appears stated age  Eyes: Lids and lashes normal, pupils equal, round and reactive to light, extra ocular muscles intact, sclera clear, conjunctiva normal  ENT: Normocephalic, without obvious abnormality, atraumatic, sinuses nontender on palpation, external ears without lesions, oral pharynx with moist mucous membranes, edentulous  Respiratory: No increased work of breathing, good air exchange, clear to auscultation bilaterally, no crackles or wheezing  Cardiovascular: Normal apical impulse, regular rate and rhythm, normal S1 and S2, no S3 or S4, and no murmur   Abdomen: Normal bowel sounds, soft, non-distended, tender to palpation in epigastric region, no masses palpated. Suprapubic  urinary catheter in place.  Skin: Normal skin color, texture, turgor  Musculoskeletal: There is no redness, warmth, or swelling of the joints.  Full range of motion noted.  Motor strength is 5 out of 5 all extremities bilaterally.  Tone is normal. Bilateral above knee amputation.  Neurologic: Awake, alert, oriented to name, place and time.  Cranial nerves II-XII are grossly intact.  Motor is weak but symmetric.  Data   Data reviewed today: I reviewed all medications, new labs and imaging results over the last 24 hours. I personally reviewed no images or EKG's today.    Recent Labs   Lab 06/14/19  1713 06/13/19  1025   WBC 15.1*  --    HGB 11.6*  --    MCV 91  --      --    INR 1.10  --      --    POTASSIUM 3.5  --    CHLORIDE 104  --    CO2 26  --    BUN 14  --    CR 0.61  --    ANIONGAP 9  --    CAROL 9.5  --    GLC 85  --    ALBUMIN 3.3* 3.3*   PROTTOTAL 8.2 8.5   BILITOTAL 0.2 0.1*   ALKPHOS 173* 192*   ALT 48 52*   AST 31 38

## 2019-06-15 NOTE — PROGRESS NOTES
Care Coordinator Progress Note    Admission Date/Time:  6/14/2019  Attending MD:  No att. providers found    Data  Chart reviewed, discussed with interdisciplinary team.   Patient was admitted for: GI bleed.    Concerns with insurance coverage for discharge needs: None.  Current Living Situation: Patient lives in an assisted living facility.  Services Involved: Assisted Living  Trans[facility] Discharge[facility]  Barriers to Discharge: Medical Stability    Coordination of Care and Referrals: Provided patient/family with options for Assisted Living.        Per team, patient will be able to discharge tomorrow, after 9-10am. Patient resides at [facility] ([address] [place] [id number], Phn: [phone]). Per nursing staff, Debbie, they can take patient back over the weekend if there are no medication or order changes.     Writer called [facility] to arrange transportation for tomorrow. They had no available rides at 9 or 10 and the first available time was 3:15pm. Transport requested. Bedside RN and team notified. RNCC will follow as needed.      Plan  Anticipated Discharge Date:  6/16/19  Anticipated Discharge Plan:  Back to AL facility.     Ember Gallagher, RN, BSN, PHN  Care Coordinator

## 2019-06-15 NOTE — PROGRESS NOTES
Observation Goals:  Prior to discharge    -diagnostic tests and consults completed and resulted - No    -vital signs normal or at patient baseline - Monitoring,- Last Hgb was 11.8    -safe disposition plan has been identified - Pending workup, Patient is from Assisted Living facility    Nurse to notify provider when observation goals have been met and patient is ready for discharge

## 2019-06-15 NOTE — PROGRESS NOTES
"-diagnostic tests and consults completed and resulted - No    -vital signs normal or at patient baseline - Monitoring, /60 (BP Location: Left arm)   Pulse 72   Temp 97.8  F (36.6  C) (Oral)   Resp 16   Ht 1.6 m (5' 3\")   Wt 63.8 kg (140 lb 9.6 oz)   SpO2 100%   BMI 24.91 kg/m       - Last Hgb was 11.8    -safe disposition plan has been identified - Patient is from Assisted Living facility    Nurse to notify provider when observation goals have been met and patient is ready for discharge      "

## 2019-06-15 NOTE — PLAN OF CARE
"-diagnostic tests and consults completed and resulted - No    -vital signs normal or at patient baseline - Monitoring, /79 (BP Location: Left arm)   Pulse 72   Temp 98  F (36.7  C) (Oral)   Resp 16   Ht 1.6 m (5' 3\")   Wt 63.8 kg (140 lb 9.6 oz)   SpO2 100%   BMI 24.91 kg/m      -safe disposition plan has been identified - Patient is from Assisted Living facility    Nurse to notify provider when observation goals have been met and patient is ready for discharge  "

## 2019-06-16 VITALS
RESPIRATION RATE: 16 BRPM | HEART RATE: 69 BPM | SYSTOLIC BLOOD PRESSURE: 118 MMHG | BODY MASS INDEX: 24.91 KG/M2 | DIASTOLIC BLOOD PRESSURE: 56 MMHG | OXYGEN SATURATION: 97 % | TEMPERATURE: 98.1 F | WEIGHT: 140.6 LBS | HEIGHT: 63 IN

## 2019-06-16 LAB
ALBUMIN SERPL-MCNC: 2.7 G/DL (ref 3.4–5)
ALP SERPL-CCNC: 159 U/L (ref 40–150)
ALT SERPL W P-5'-P-CCNC: 39 U/L (ref 0–50)
ANION GAP SERPL CALCULATED.3IONS-SCNC: 6 MMOL/L (ref 3–14)
AST SERPL W P-5'-P-CCNC: 24 U/L (ref 0–45)
BASOPHILS # BLD AUTO: 0.1 10E9/L (ref 0–0.2)
BASOPHILS NFR BLD AUTO: 0.8 %
BILIRUB SERPL-MCNC: <0.1 MG/DL (ref 0.2–1.3)
BUN SERPL-MCNC: 11 MG/DL (ref 7–30)
CALCIUM SERPL-MCNC: 8.5 MG/DL (ref 8.5–10.1)
CHLORIDE SERPL-SCNC: 113 MMOL/L (ref 94–109)
CO2 SERPL-SCNC: 26 MMOL/L (ref 20–32)
CREAT SERPL-MCNC: 0.48 MG/DL (ref 0.52–1.04)
DIFFERENTIAL METHOD BLD: ABNORMAL
EOSINOPHIL # BLD AUTO: 0.1 10E9/L (ref 0–0.7)
EOSINOPHIL NFR BLD AUTO: 1.3 %
ERYTHROCYTE [DISTWIDTH] IN BLOOD BY AUTOMATED COUNT: 15.9 % (ref 10–15)
GFR SERPL CREATININE-BSD FRML MDRD: >90 ML/MIN/{1.73_M2}
GLUCOSE BLDC GLUCOMTR-MCNC: 116 MG/DL (ref 70–99)
GLUCOSE BLDC GLUCOMTR-MCNC: 92 MG/DL (ref 70–99)
GLUCOSE SERPL-MCNC: 89 MG/DL (ref 70–99)
HCT VFR BLD AUTO: 35 % (ref 35–47)
HGB BLD-MCNC: 10.9 G/DL (ref 11.7–15.7)
IMM GRANULOCYTES # BLD: 0 10E9/L (ref 0–0.4)
IMM GRANULOCYTES NFR BLD: 0.4 %
LYMPHOCYTES # BLD AUTO: 1.3 10E9/L (ref 0.8–5.3)
LYMPHOCYTES NFR BLD AUTO: 17.1 %
MCH RBC QN AUTO: 28.6 PG (ref 26.5–33)
MCHC RBC AUTO-ENTMCNC: 31.1 G/DL (ref 31.5–36.5)
MCV RBC AUTO: 92 FL (ref 78–100)
MONOCYTES # BLD AUTO: 0.8 10E9/L (ref 0–1.3)
MONOCYTES NFR BLD AUTO: 10.4 %
NEUTROPHILS # BLD AUTO: 5.4 10E9/L (ref 1.6–8.3)
NEUTROPHILS NFR BLD AUTO: 70 %
NRBC # BLD AUTO: 0 10*3/UL
NRBC BLD AUTO-RTO: 0 /100
PLATELET # BLD AUTO: 252 10E9/L (ref 150–450)
POTASSIUM SERPL-SCNC: 3.6 MMOL/L (ref 3.4–5.3)
PROT SERPL-MCNC: 6.9 G/DL (ref 6.8–8.8)
RBC # BLD AUTO: 3.81 10E12/L (ref 3.8–5.2)
SODIUM SERPL-SCNC: 144 MMOL/L (ref 133–144)
WBC # BLD AUTO: 7.7 10E9/L (ref 4–11)

## 2019-06-16 PROCEDURE — C9113 INJ PANTOPRAZOLE SODIUM, VIA: HCPCS | Performed by: NURSE PRACTITIONER

## 2019-06-16 PROCEDURE — 25000132 ZZH RX MED GY IP 250 OP 250 PS 637: Performed by: NURSE PRACTITIONER

## 2019-06-16 PROCEDURE — 94640 AIRWAY INHALATION TREATMENT: CPT

## 2019-06-16 PROCEDURE — 25000128 H RX IP 250 OP 636: Performed by: NURSE PRACTITIONER

## 2019-06-16 PROCEDURE — 94660 CPAP INITIATION&MGMT: CPT

## 2019-06-16 PROCEDURE — 96376 TX/PRO/DX INJ SAME DRUG ADON: CPT

## 2019-06-16 PROCEDURE — 40000275 ZZH STATISTIC RCP TIME EA 10 MIN

## 2019-06-16 PROCEDURE — 00000146 ZZHCL STATISTIC GLUCOSE BY METER IP

## 2019-06-16 PROCEDURE — 36415 COLL VENOUS BLD VENIPUNCTURE: CPT | Performed by: NURSE PRACTITIONER

## 2019-06-16 PROCEDURE — 25000125 ZZHC RX 250: Performed by: NURSE PRACTITIONER

## 2019-06-16 PROCEDURE — G0378 HOSPITAL OBSERVATION PER HR: HCPCS

## 2019-06-16 PROCEDURE — 99217 ZZC OBSERVATION CARE DISCHARGE: CPT | Mod: Z6 | Performed by: FAMILY MEDICINE

## 2019-06-16 PROCEDURE — 85025 COMPLETE CBC W/AUTO DIFF WBC: CPT | Performed by: NURSE PRACTITIONER

## 2019-06-16 PROCEDURE — 80053 COMPREHEN METABOLIC PANEL: CPT | Performed by: NURSE PRACTITIONER

## 2019-06-16 RX ORDER — LIDOCAINE 4 G/G
1 PATCH TOPICAL
Status: DISCONTINUED | OUTPATIENT
Start: 2019-06-16 | End: 2019-06-16

## 2019-06-16 RX ADMIN — DICLOFENAC 2 G: 10 GEL TOPICAL at 11:32

## 2019-06-16 RX ADMIN — LISINOPRIL 20 MG: 20 TABLET ORAL at 08:10

## 2019-06-16 RX ADMIN — TOLTERODINE 4 MG: 4 CAPSULE, EXTENDED RELEASE ORAL at 08:11

## 2019-06-16 RX ADMIN — IPRATROPIUM BROMIDE AND ALBUTEROL SULFATE 3 ML: .5; 3 SOLUTION RESPIRATORY (INHALATION) at 12:17

## 2019-06-16 RX ADMIN — SUCRALFATE 1 G: 1 SUSPENSION ORAL at 11:31

## 2019-06-16 RX ADMIN — POLYETHYLENE GLYCOL 3350 17 G: 17 POWDER, FOR SOLUTION ORAL at 08:11

## 2019-06-16 RX ADMIN — METHYLCELLULOSE 500 MG: 500 TABLET ORAL at 11:31

## 2019-06-16 RX ADMIN — PHENYTOIN SODIUM 200 MG: 100 CAPSULE ORAL at 11:31

## 2019-06-16 RX ADMIN — ATORVASTATIN CALCIUM 40 MG: 40 TABLET, FILM COATED ORAL at 08:10

## 2019-06-16 RX ADMIN — PREGABALIN 150 MG: 75 CAPSULE ORAL at 15:25

## 2019-06-16 RX ADMIN — PANTOPRAZOLE SODIUM 40 MG: 40 INJECTION, POWDER, FOR SOLUTION INTRAVENOUS at 08:12

## 2019-06-16 RX ADMIN — LEVETIRACETAM 500 MG: 500 TABLET ORAL at 08:11

## 2019-06-16 RX ADMIN — IPRATROPIUM BROMIDE AND ALBUTEROL SULFATE 3 ML: .5; 3 SOLUTION RESPIRATORY (INHALATION) at 08:37

## 2019-06-16 RX ADMIN — ACETAMINOPHEN 650 MG: 325 TABLET, FILM COATED ORAL at 08:09

## 2019-06-16 RX ADMIN — METOPROLOL SUCCINATE 50 MG: 50 TABLET, EXTENDED RELEASE ORAL at 08:10

## 2019-06-16 RX ADMIN — PREGABALIN 150 MG: 75 CAPSULE ORAL at 08:11

## 2019-06-16 RX ADMIN — SUCRALFATE 1 G: 1 SUSPENSION ORAL at 08:11

## 2019-06-16 ASSESSMENT — PAIN DESCRIPTION - DESCRIPTORS: DESCRIPTORS: BURNING

## 2019-06-16 NOTE — PROGRESS NOTES
"Patient dressed and ready to discharge back to Assisted Living Facility. Packed printed. [facility] here with w/c, patient transferred self from bed to w/c. All open medications sent with patient along with all personal belongings.  /56 (BP Location: Right arm)   Pulse 69   Temp 98.1  F (36.7  C) (Oral)   Resp 16   Ht 1.6 m (5' 3\")   Wt 63.8 kg (140 lb 9.6 oz)   SpO2 97%   BMI 24.91 kg/m      "

## 2019-06-16 NOTE — DISCHARGE SUMMARY
"ED Observation Discharge Summary    Sonya Foote   MRN# [id number]  Age: 69 year old   YOB: 1950            Date of Admission:  6/14/2019    Date of Discharge:  6/16/2019  Admitting Physician:  Nevin Wagner MD  Discharge Physician: Dr. Conner MD  NP/PA: Ashly Romero CNP      DISCHARGE DIAGNOSIS:   1. Abdominal Pain, resolved    INTERVAL HISTORY: VSS, afebrile. Abdominal pain has resolved. Only pain at this time is her back pain. Fees ready to discharge to home. Tolerating PO intake.     PHYSICAL EXAM:   Blood pressure 143/73, pulse 80, temperature 98.1  F (36.7  C), temperature source Oral, resp. rate 16, height 1.6 m (5' 3\"), weight 63.8 kg (140 lb 9.6 oz), SpO2 97 %, not currently breastfeeding.       GENERAL: Alert and oriented x 3. NAD.   HEENT: Anicteric sclera. Mucous membranes moist.   CV: RRR. S1, S2. No murmurs appreciated.   RESPIRATORY: Effort normal. Lungs CTAB with no wheezing, rales, rhonchi.   GI: Abdomen soft, mild diffuse TTP. Non distended with normoactive bowel sounds present in all quadrants. No rebound, guarding.   NEUROLOGICAL: No focal deficits. Moves all extremities.    EXTREMITIES: Bilateral AKA   SKIN: No jaundice. No rashes.         PROCEDURES AND IMAGING:   Results for orders placed or performed during the hospital encounter of 06/14/19 (from the past 24 hour(s))   Glucose by meter   Result Value Ref Range    Glucose 117 (H) 70 - 99 mg/dL   CBC with platelets differential   Result Value Ref Range    WBC 9.4 4.0 - 11.0 10e9/L    RBC Count 3.92 3.8 - 5.2 10e12/L    Hemoglobin 11.1 (L) 11.7 - 15.7 g/dL    Hematocrit 37.1 35.0 - 47.0 %    MCV 95 78 - 100 fl    MCH 28.3 26.5 - 33.0 pg    MCHC 29.9 (L) 31.5 - 36.5 g/dL    RDW 16.3 (H) 10.0 - 15.0 %    Platelet Count 282 150 - 450 10e9/L    Diff Method Automated Method     % Neutrophils 77.2 %    % Lymphocytes 14.1 %    % Monocytes 7.3 %    % Eosinophils 0.6 %    % Basophils 0.4 %    % Immature Granulocytes 0.4 %    " Nucleated RBCs 0 0 /100    Absolute Neutrophil 7.2 1.6 - 8.3 10e9/L    Absolute Lymphocytes 1.3 0.8 - 5.3 10e9/L    Absolute Monocytes 0.7 0.0 - 1.3 10e9/L    Absolute Eosinophils 0.1 0.0 - 0.7 10e9/L    Absolute Basophils 0.0 0.0 - 0.2 10e9/L    Abs Immature Granulocytes 0.0 0 - 0.4 10e9/L    Absolute Nucleated RBC 0.0    Glucose by meter   Result Value Ref Range    Glucose 112 (H) 70 - 99 mg/dL   Lactic acid whole blood   Result Value Ref Range    Lactic Acid 0.9 0.7 - 2.0 mmol/L   Drug abuse screen 6 urine (chem dep) ([facility])   Result Value Ref Range    Amphetamine Qual Urine Negative NEG^Negative    Barbiturates Qual Urine Negative NEG^Negative    Benzodiazepine Qual Urine Negative NEG^Negative    Cannabinoids Qual Urine Negative NEG^Negative    Cocaine Qual Urine Negative NEG^Negative    Ethanol Qual Urine Negative NEG^Negative    Opiates Qualitative Urine Negative NEG^Negative   Glucose by meter   Result Value Ref Range    Glucose 116 (H) 70 - 99 mg/dL   CBC with platelets differential   Result Value Ref Range    WBC 7.7 4.0 - 11.0 10e9/L    RBC Count 3.81 3.8 - 5.2 10e12/L    Hemoglobin 10.9 (L) 11.7 - 15.7 g/dL    Hematocrit 35.0 35.0 - 47.0 %    MCV 92 78 - 100 fl    MCH 28.6 26.5 - 33.0 pg    MCHC 31.1 (L) 31.5 - 36.5 g/dL    RDW 15.9 (H) 10.0 - 15.0 %    Platelet Count 252 150 - 450 10e9/L    Diff Method Automated Method     % Neutrophils 70.0 %    % Lymphocytes 17.1 %    % Monocytes 10.4 %    % Eosinophils 1.3 %    % Basophils 0.8 %    % Immature Granulocytes 0.4 %    Nucleated RBCs 0 0 /100    Absolute Neutrophil 5.4 1.6 - 8.3 10e9/L    Absolute Lymphocytes 1.3 0.8 - 5.3 10e9/L    Absolute Monocytes 0.8 0.0 - 1.3 10e9/L    Absolute Eosinophils 0.1 0.0 - 0.7 10e9/L    Absolute Basophils 0.1 0.0 - 0.2 10e9/L    Abs Immature Granulocytes 0.0 0 - 0.4 10e9/L    Absolute Nucleated RBC 0.0    Comprehensive metabolic panel   Result Value Ref Range    Sodium 144 133 - 144 mmol/L    Potassium 3.6 3.4 - 5.3 mmol/L     Chloride 113 (H) 94 - 109 mmol/L    Carbon Dioxide 26 20 - 32 mmol/L    Anion Gap 6 3 - 14 mmol/L    Glucose 89 70 - 99 mg/dL    Urea Nitrogen 11 7 - 30 mg/dL    Creatinine 0.48 (L) 0.52 - 1.04 mg/dL    GFR Estimate >90 >60 mL/min/[1.73_m2]    GFR Estimate If Black >90 >60 mL/min/[1.73_m2]    Calcium 8.5 8.5 - 10.1 mg/dL    Bilirubin Total <0.1 (L) 0.2 - 1.3 mg/dL    Albumin 2.7 (L) 3.4 - 5.0 g/dL    Protein Total 6.9 6.8 - 8.8 g/dL    Alkaline Phosphatase 159 (H) 40 - 150 U/L    ALT 39 0 - 50 U/L    AST 24 0 - 45 U/L   Glucose by meter   Result Value Ref Range    Glucose 92 70 - 99 mg/dL     *Note: Due to a large number of results and/or encounters for the requested time period, some results have not been displayed. A complete set of results can be found in Results Review.     DISCHARGE MEDICATIONS:   Current Discharge Medication List      CONTINUE these medications which have NOT CHANGED    Details   ACETAMINOPHEN PO Take 1,000 mg by mouth every 6 hours as needed for pain Not to exceed 4000 mg/day      ADVAIR DISKUS 250-50 MCG/DOSE diskus inhaler Inhale 1 puff into the lungs 2 times daily  Qty: 60 Inhaler, Refills: 11    Associated Diagnoses: Acute bronchitis      albuterol (PROVENTIL) (2.5 MG/3ML) 0.083% neb solution INHALE 1 VIAL VIA NEBULIZER EVERY 6 HOURS AS NEEDED  Qty: 360 mL, Refills: 11    Associated Diagnoses: Chronic obstructive pulmonary disease, unspecified COPD type (H)      alendronate (FOSAMAX) 70 MG tablet Take 1 tablet (70 mg) by mouth with 8oz water every 7 days 30 minutes before breakfast and remain upright during this time.  Qty: 12 tablet, Refills: 3    Associated Diagnoses: Age-related osteoporosis without current pathological fracture      aspirin EC 81 MG EC tablet Take 1 tablet (81 mg) by mouth daily  Qty: 90 tablet, Refills: 3    Associated Diagnoses: Unstable angina (H)      atorvastatin (LIPITOR) 40 MG tablet TAKE 1 TAB BY MOUTH ONCE DAILY  Qty: 30 tablet, Refills: 11    Associated  Diagnoses: Hyperlipidemia LDL goal <100      brimonidine (ALPHAGAN) 0.2 % ophthalmic solution Place 1 drop into the right eye 2 times daily  Qty: 1 Bottle, Refills: 11    Associated Diagnoses: Primary open angle glaucoma of both eyes, severe stage      cetirizine (ZYRTEC) 10 MG tablet Take 1 tablet (10 mg) by mouth every evening  Qty: 30 tablet, Refills: 3    Associated Diagnoses: Chronic seasonal allergic rhinitis, unspecified trigger      Cholecalciferol (VITAMIN D) 2000 UNITS tablet Take 2,000 Units by mouth daily.  Qty: 100 tablet, Refills: 3      clotrimazole (LOTRIMIN) 1 % cream INSERT 1 APPLICATORFUL VAGINALLY TWICE A DAY FOR VAGINAL PAIN  Qty: 12 g, Refills: 0    Comments: PLEASE AUTHORIZE REFILLS  Associated Diagnoses: Rash      diclofenac (VOLTAREN) 1 % GEL topical gel Apply 2 g topically 4 times daily to hands  Qty: 100 g, Refills: 11    Associated Diagnoses: Primary osteoarthritis of both hands      dorzolamide (TRUSOPT) 2 % ophthalmic solution Place 1 drop into the right eye 2 times daily  Qty: 1 Bottle, Refills: 11    Associated Diagnoses: Primary open angle glaucoma of both eyes, severe stage      EPINEPHrine (EPIPEN/ADRENACLICK/OR ANY BX GENERIC EQUIV) 0.3 MG/0.3ML injection 2-pack Inject 0.3 mLs (0.3 mg) into the muscle as needed for anaphylaxis  Qty: 0.6 mL, Refills: 1    Associated Diagnoses: Bee sting reaction, undetermined intent, subsequent encounter      escitalopram (LEXAPRO) 10 MG tablet TAKE 1 TAB BY MOUTH ONCE DAILY  Qty: 30 tablet, Refills: 11    Associated Diagnoses: Schizoaffective disorder, unspecified type (H)      ferrous sulfate (IRON) 325 (65 FE) MG tablet Take 1 tablet (325 mg) by mouth 2 times daily With meals  Qty: 60 tablet, Refills: 2      fluticasone (FLONASE) 50 MCG/ACT nasal spray Spray 1 spray into both nostrils daily      lactulose (CHRONULAC) 10 GM/15ML solution Take 20 g by mouth as needed for constipation      latanoprost (XALATAN) 0.005 % ophthalmic solution Place 1  drop into both eyes At Bedtime  Qty: 2.5 mL, Refills: 11    Associated Diagnoses: Primary open angle glaucoma of both eyes, severe stage      levETIRAcetam (KEPPRA) 500 MG tablet TAKE 1 TAB BY MOUTH TWICE A DAY  Qty: 60 tablet, Refills: 5    Associated Diagnoses: Nausea      lisinopril (PRINIVIL/ZESTRIL) 20 MG tablet TAKE 1 TAB BY MOUTH ONCE DAILY  Qty: 30 tablet, Refills: 11    Associated Diagnoses: History of coronary artery disease      metFORMIN (GLUCOPHAGE) 500 MG tablet TAKE 1 TAB BY MOUTH IN THE EVENING WITH DINNER  Qty: 30 tablet, Refills: 5    Associated Diagnoses: Diabetes mellitus type 2 without retinopathy (H)      metoprolol succinate ER (TOPROL-XL) 50 MG 24 hr tablet TAKE 1 TAB BY MOUTH ONCE DAILY  Qty: 30 tablet, Refills: 11    Associated Diagnoses: History of coronary artery disease      nicotine (NICODERM CQ) 14 MG/24HR 24 hr patch Place 1 patch onto the skin every 24 hours  Qty: 30 patch, Refills: 3    Associated Diagnoses: Current tobacco use      nitroglycerin (NITROSTAT) 0.4 MG SL tablet Place 1 tablet (0.4 mg) under the tongue every 5 minutes as needed for chest pain if you are still having symptoms after 3 doses (15 minutes) call 911.  Qty: 25 tablet, Refills: 1    Associated Diagnoses: Chronic systolic congestive heart failure (H); Old myocardial infarction      ondansetron (ZOFRAN-ODT) 8 MG ODT tab Take 1 tablet (8 mg) by mouth every 8 hours as needed  Qty: 30 tablet, Refills: 5    Associated Diagnoses: Other migraine without status migrainosus, not intractable      pantoprazole (PROTONIX) 40 MG EC tablet TAKE 1 TAB BY MOUTH ONCE DAILY  Qty: 30 tablet, Refills: 11    Associated Diagnoses: Gastroesophageal reflux disease without esophagitis      phenytoin (DILANTIN) 100 MG capsule TAKE 2 CAPS (200MG) BY MOUTH TWICE A DAY  Qty: 120 capsule, Refills: 11    Associated Diagnoses: Seizure disorder (H)      polyethylene glycol (MIRALAX/GLYCOLAX) Packet Take 17 g by mouth daily Dissolved in water or  juice       pregabalin (LYRICA) 75 MG capsule Take 150 mg by mouth 3 times daily      risperiDONE (RISPERDAL) 0.5 MG tablet TAKE 1 TAB BY MOUTH AT BEDTIME  Qty: 30 tablet, Refills: 5    Associated Diagnoses: Schizoaffective disorder, unspecified type (H)      sennosides (SENOKOT) 8.6 MG tablet Take 1 tablet by mouth 2 times daily       solifenacin (VESICARE) 10 MG tablet TAKE 1 TAB BY MOUTH ONCE DAILY  Qty: 30 tablet, Refills: 11    Associated Diagnoses: Urinary frequency      sucralfate (CARAFATE) 1 GM tablet TAKE 1 TAB BY MOUTH FOUR TIMES DAILY. MAY DISSOLVE IN 10ML WATER ISNECESSARY  Qty: 120 tablet, Refills: 11    Comments: PLEASE AUTHORIZE REFILLS, THANK YOU.  Associated Diagnoses: Adjustment disorder with depressed mood      traZODone (DESYREL) 150 MG tablet TAKE 1 TAB BY MOUTH AT BEDTIME  Qty: 30 tablet, Refills: 11    Associated Diagnoses: Schizoaffective disorder, unspecified type (H)      umeclidinium (INCRUSE ELLIPTA) 62.5 MCG/INH inhaler Inhale 1 puff into the lungs daily  Qty: 1 Inhaler, Refills: 6    Associated Diagnoses: Chronic obstructive pulmonary disease, unspecified COPD type (H)      VENTOLIN  (90 Base) MCG/ACT inhaler Inhale 2 puffs into the lungs every 6 hours as needed for shortness of breath / dyspnea or wheezing  Qty: 8.5 g, Refills: 11    Associated Diagnoses: Chronic obstructive pulmonary disease, unspecified COPD type (H)      blood glucose monitoring (ONE TOUCH ULTRA 2) meter device kit Use to test blood sugars 3 times daily or as directed.  Qty: 1 kit, Refills: 0    Associated Diagnoses: Type 2 diabetes, HbA1C goal < 8% (H)      blood glucose monitoring (ONE TOUCH ULTRA) test strip Use to test blood sugars 3 times daily or as directed.  Qty: 3 Box, Refills: 3    Associated Diagnoses: Type 2 diabetes mellitus with diabetic peripheral angiopathy without gangrene, without long-term current use of insulin (H)      blood glucose monitoring (ONE TOUCH ULTRASOFT) lancets Use to test  blood sugar 3 times daily or as directed.  Qty: 100 each, Refills: PRN    Associated Diagnoses: Type 2 diabetes mellitus with diabetic peripheral angiopathy without gangrene, without long-term current use of insulin (H)      !! order for DME Equipment being ordered: lift recliner  Qty: 1 Device, Refills: 0    Associated Diagnoses: S/P AKA (above knee amputation) bilateral (H)      !! order for DME Equipment being ordered: Hospital Bed with side rails  Qty: 1 each, Refills: 0    Associated Diagnoses: Seizure disorder (H); CVA, old, hemiparesis (H); Late effect of stroke      !! order for DME Equipment being ordered: Power Wheel Chair  Qty: 1 Device, Refills: 0    Associated Diagnoses: 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 (H)      !! order for DME Equipment being ordered: bandage tape, prefer paper  Qty: 1 Package, Refills: 0    Associated Diagnoses: Burn of skin      !! order for DME Full electric hospital bed with half rails    Dx: U21625, I110, J449  Length of need: lifetime  Qty: 1 Device, Refills: 0    Associated Diagnoses: Status post bilateral above knee amputation (H)      !! order for DME Wheel Chair Cushion: 18 x 18 inch Roho cushion  Qty: 1 Device, Refills: 0    Associated Diagnoses: Status post bilateral above knee amputation (H)      !! order for DME Hospital bed with side rails  Qty: 1 Device, Refills: 0    Associated Diagnoses: 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 (H)      !! order for DME Equipment being ordered: CPAP supplies mask, hose, filters, cushion    fax to [facility] at [phone]  Qty: 10 Device, Refills: prn    Associated Diagnoses: COPD (chronic obstructive pulmonary disease) (H)      !! order for DME Equipment being ordered: CPAP supplies mask, hose, filters, cushion fax to [facility] at [phone]  Disp: 10 Device Refills: prn   Class: Local Print Start: 2/10/2017  Qty: 1 Device, Refills: 0    Associated Diagnoses: Chronic  "obstructive pulmonary disease, unspecified COPD type (H)      !! order for DME Equipment being ordered: Nebulizer and tubing supplies  Qty: 1 Units, Refills: 0    Associated Diagnoses: Simple chronic bronchitis (H)      !! order for DME Equipment being ordered: Glucerna daily shakes with each meal  Qty: 1 Box, Refills: 11    Associated Diagnoses: 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)      !! ORDER FOR DME Equipment being ordered: Power Wheelchair  Qty: 1 Device, Refills: 0    Associated Diagnoses: CVA (cerebral infarction); HTN (hypertension)      !! ORDER FOR DME Equipment being ordered: Depends briefs  Qty: 1 Month, Refills: 11    Associated Diagnoses: Incontinence       !! - Potential duplicate medications found. Please discuss with provider.            CONSULTATIONS:   Consultation during this admission received from:  GI     BRIEF HISTORY OF PRESENT ILLNESS:   (Adopted from admission H&P).    \"Sonya Foote is a 69 year old female with history notable for COPD, diastolic CHF, DM 2, HTN, HLD, GERD, and right BKA who presents to the ED for multiple complaints.  Patient states that she has upper abdominal pain and a couple of days of black tarry stools.  She reports that she has previous history of hemorrhoids.  She also complains of vomiting, dysuria, and noted some malodorous urine.  She also reports that she has some subjective fevers and chills last night.  She otherwise denies taking anticoagulants currently besides baby aspirin, hematemesis, or diarrhea.\"     Pt was stable on arrival to ED Observation Unit.\"           ED OBSERVATION COURSE:  Sonya Foote is a 69 year old female with history notable for COPD, diastolic CHF, DM 2, HTN, HLD, GERD, and bilateral AKA who presents to the ED for upper abdominal pain and a couple of days of black tarry stools, as well as malodorous urine from her suprapubic catheter and some nausea and vomiting for the last 2-3 days. Also reports, ~ 1 " week of black tarry stools. She was admitted to observation with concern for GI bleeding. Rectal exam showed brown stool. GI consulted and did not recommend further work-up. She tolerated PO intake. hgb is stable and abdominal pain has almost completely resolved, aside from mild TTP. She is stable for discharge back to [facility]. We are unable to make any changes to her home mediations as she will not be able to return to [facility] on Sunday with any new medications. At this time is safe for her to continue all of her home medications and follow-up with her primary care MD.      1. Abdomina Pain: Pain now resolved. Tolerating PO intake.  She reports a bleeding ulcer 15yrs ago and also a history of hemorrhoids.  HD stable, afebrile. Hgb 11.6--> 11.8 --> 11.0-->11.1--> 10.9.   LFTs's stable. INR 1.1. Abdomen is tender to palpation to epigastric region. UA is catheterized urine from suprapubic catheter, large LE but only 4 WBC. Culture is negative. Gi was consulted and did not recommend further work-up.  Does take iron. Black stools could be related to iron tablets. Resume OP PPI and carafate   n      2. Nausea, vomiting: Resolved. No vomiting since presentation to the ED. WBC 15.1 --> 9.8. Was likely reactive related to n/v.      Chronic Medical Problems  ##Anemia: Stable. Resume iron. Follow-up with PCP     ##DM2: Resume metformin when tolerating PO intake. Monitor glucose. Last A1c in April, it was 5.0.     ##COPD: Resum home inhalers.      ##Schizophrenia: Continue PTA risperdal and lexapro     ##Hx of Seizures: Seizure precautions ordered. Pt reports grand mal 15 years ago, petit mal seizures on a daily basis. Continue PTA Keppra, PTA Dilantin     ##PVD   ##Bilateral Above Knee Amputation: Nov 2016 2/2 DM and PVD. Pt uses electronic wheelchair and lives in [facility] in [place]. Care coordinator consult placed for dispo planning.     ##HTN: continue PTA lisinopril and toprol     ##Hx of  "CVA: reported residual left sided weakness, although unable to appreciate. Pt is generally weak and but symmetric.      ##Blind in left eye: Also has limited poor vision in right eye.     ##Tobacco Abuse: 75 pack year history.   -Nicotine patch       ##GERD: continue PTA Protonix, but change to IV and give BID     ##Incontinence: Suprapubic catheter in place. History of UTIs with this. Continue PTA Detrol LA.   -Urine culture negative.      ##Chronic Pain: Pt reports she has a pain pump in her back that delivers Fentanyl but \"it hasn't been working\" she is following with [facility] and is to see them Monday for imaging and clinic visit. She has been off of narcotics for pain for several months now.          DISCHARGE DISPOSITION:   Discharged to home, [facility]. The patient was discharged in a stable condition and agreed to discharge plan.    DISCHARGE INSTRUCTIONS AND FOLLOW-UP:  Discharge Procedure Orders   Reason for your hospital stay   Order Comments: You were admitted to observation due to concerns for gastrointestinal bleeding. Gi was consulted and did not think you were having bleeding. They cleared you for discharge to home. You pain improved and you are tolerating oral intake. We can discharge you to home.     Adult [facility] Follow-up and recommended labs and tests   Order Comments: Follow up with primary care provider, Allan Casey, within 2 days for hospital follow- up.  The following labs/tests are recommended: CBC, BMP.      Appointments on [place] and/or [facility] (with [facility] or [facility] provider or service). Call [phone] if you haven't heard regarding these appointments within 7 days of discharge.     Activity   Order Comments: Your activity upon discharge: activity as tolerated     Order Specific Question Answer Comments   Is discharge order? Yes      When to contact your care team   Order Comments: Return to the ED with fever, uncontrolled nausea, vomiting, unrelieved pain, bleeding " not relieved with pressure, dizziness, chest pain, shortness of breath, loss of consciousness, and any new or concerning symptoms.     Monitor and record   Order Comments: blood glucose 4 times a day, before meals and at bedtime     Diet   Order Comments: Follow this diet upon discharge:   Diabetic diet     Order Specific Question Answer Comments   Is discharge order? Yes       Attestation:   I have reviewed today's vital signs, notes, medications, labs and imaging.      VICTOR M Bernal, CNP  Emergency Department Observation Unit

## 2019-06-16 NOTE — PLAN OF CARE
Observation Goals:      -diagnostic tests and consults completed and resulted: not met  -vital signs normal or at patient baseline: met  -safe disposition plan has been identified: met     A&Ox4, AVSS on RA. Denies pain, nausea or dyspnea. Pt declined use of BiPAP for second part of shift. RPIV infusing D5NS @ 100mL/hr. Suprapubic catheter patent w/ adequate UOP. Need stool sample, no BM this shift. Will continue to monitor and follow POC.

## 2019-06-16 NOTE — PLAN OF CARE
Observation Goals:     -diagnostic tests and consults completed and resulted: not met  -vital signs normal or at patient baseline: met  -safe disposition plan has been identified: met     A&Ox4, AVSS on BiPAP (pt has COPD). Denies pain, nausea or dyspnea. Suprapubic catheter w/ adequate UOP. No BM this shift, still need to collect stool sample. PIV infusing D5NS @ 100mL/hr. Will continue to monitor and follow POC.

## 2019-06-16 NOTE — PROGRESS NOTES
Observation Goals:  Prior to discharge    -diagnostic tests and consults completed and resulted - YES, GI in to see patient, rectal exam with no black stool. No EGD planned.   Patient tolerating regular diet.    -vital signs normal or at patient baseline - YES    -safe disposition plan has been identified - YES, Patient is from Assisted Living facility. SW has w/c ride arranged for tomorrow at 3:15 pm with [facility].    Nurse to notify provider when observation goals have been met and patient is ready for discharge

## 2019-06-16 NOTE — PLAN OF CARE
Observation Goals:   -diagnostic tests and consults completed and resulted: NO  -vital signs normal or at patient baseline: YES  -safe disposition plan has been identified: YES  Nurse to notify provider when observation goals have been met and patient is ready for discharge    Given prn tylenol for headache. Pt had small amount of hard stool post miralax. Urine sample sent. Please collect stool sample. Continue with cares.

## 2019-06-16 NOTE — PROGRESS NOTES
Observation Goals:  Prior to discharge    -diagnostic tests and consults completed and resulted - YES, GI in to see patient, rectal exam with no black stool. No EGD planned.   Patient tolerating regular diet.    -vital signs normal or at patient baseline - YES    -safe disposition plan has been identified - YES, Patient is from Assisted Living facility. SW has w/c ride arranged for tomorrow at 3:15 pm with [facility].    Nurse to notify provider when observation goals have been met and patient is ready for discharge

## 2019-06-17 ENCOUNTER — TELEPHONE (OUTPATIENT)
Dept: INTERNAL MEDICINE | Facility: CLINIC | Age: 69
End: 2019-06-17

## 2019-06-17 ENCOUNTER — TELEPHONE (OUTPATIENT)
Dept: UROLOGY | Facility: CLINIC | Age: 69
End: 2019-06-17

## 2019-06-17 ENCOUNTER — PATIENT OUTREACH (OUTPATIENT)
Dept: CARE COORDINATION | Facility: CLINIC | Age: 69
End: 2019-06-17

## 2019-06-17 ENCOUNTER — HOSPITAL ENCOUNTER (OUTPATIENT)
Dept: MRI IMAGING | Facility: CLINIC | Age: 69
Discharge: HOME | End: 2019-06-17
Attending: PAIN MEDICINE | Admitting: PAIN MEDICINE
Payer: COMMERCIAL

## 2019-06-17 DIAGNOSIS — M51.369 OTHER INTERVERTEBRAL DISC DEGENERATION, LUMBAR REGION: ICD-10-CM

## 2019-06-17 DIAGNOSIS — M47.26 OTHER SPONDYLOSIS WITH RADICULOPATHY, LUMBAR REGION: ICD-10-CM

## 2019-06-17 DIAGNOSIS — M54.15 RADICULOPATHY, THORACOLUMBAR REGION: ICD-10-CM

## 2019-06-17 LAB
BACTERIA SPEC CULT: ABNORMAL
BACTERIA SPEC CULT: ABNORMAL
Lab: ABNORMAL
SPECIMEN SOURCE: ABNORMAL

## 2019-06-17 PROCEDURE — 72148 MRI LUMBAR SPINE W/O DYE: CPT

## 2019-06-17 ASSESSMENT — ACTIVITIES OF DAILY LIVING (ADL): DEPENDENT_IADLS:: CLEANING;COOKING;LAUNDRY;SHOPPING;MEAL PREPARATION;TRANSPORTATION

## 2019-06-17 NOTE — TELEPHONE ENCOUNTER
Patient called and told that we will leave catheter at the size that is in right now as long as it drains. Ludy Ernst, JENN Staff Nurse

## 2019-06-17 NOTE — TELEPHONE ENCOUNTER
----- Message from MEE Mcconnell sent at 6/14/2019  7:20 AM CDT -----  Regarding: RE: needs nurse visits x 2  OK.  If she wants to stick with a 14Fr that is fine as long as it continues to drain normally.   Can you copy this message to the chart?  Ange Sky    ----- Message -----  From: Sara Ernst LPN  Sent: 6/13/2019   7:50 AM  To: MEE Mcconnell  Subject: FW: needs nurse visits x 2                       He this is sara patients  does not want to make an appt in the ED they put in 14 and that is what they want .  16 fr is too painful for the patient Sara Ernst LPN Staff Nurse    ----- Message -----  From: Sara Ernst LPN  Sent: 6/13/2019   7:42 AM  To: Sara Ernst LPN  Subject: FW: needs nurse visits x 2                           ----- Message -----  From: Yi Mathews RN  Sent: 6/13/2019  To: Yi Mathews RN  Subject: FW: needs nurse visits x 2                           ----- Message -----  From: Saadia Baez PA  Sent: 5/21/2019  12:30 PM  To: Yi Mathews RN, Clinic Coordinators[facility]  Subject: needs nurse visits x 2                           Hi Coordinators:  Her SPT fell out yesterday and I was able to replace a 12Fr today in the ED.     Can she get a nurse visit in 7--10 days to upsize the tube to a 14Fr (or 16Fr, if able).  Then she'll likely need another appt in ~3 weeks to upsize to a 16Fr (if she isn't already there).     Thereafter she can resume tube changes with her nursing home.   Thanks!  Ange

## 2019-06-17 NOTE — PROGRESS NOTES
Clinic Care Coordination Contact  Care Team Conversations    TAMARA BAUER spoke with Abi at [facility] admissions to discuss pt's interest in moving into LTC. She explained that residents share a room unless they have medical necessity for their own room and their waiver  is agreeable to paying the extra cost. She also explained that there should be no issue with checking the glucose and blood pressure regularly if there is a doctor's orders instructing that this is needed. For pt to be considered for admission a new referral will be needed.     TAMARA BAUER contacted Lupe, Resident Relations, at [facility] to discuss potentially transferring pt's care to [facility] and providing needed information for this transition. CC requested return call.    TAMARA BAUER followed up with pt to inform of the process and potential availability to move into [facility] LT. TAMARA BAUER explained room situation and cares provided. Pt was agreeable to this.     Plan: TAMARA BAUER will await return call from Lupe at [facility] to determine next steps for pt. Pt was reminded of TAMARA BAUER contact information and encouraged to call with questions or concerns that arise before next outreach.    NAOMI Allan, [facility]  Clinic Care Coordinator  [facility] [place]  [phone]  [email or web address]

## 2019-06-17 NOTE — TELEPHONE ENCOUNTER
Prior Authorization Retail Medication Request    Medication/Dose:diclofenac (VOLTAREN) 1 % GEL topical gel   ICD code (if different than what is on RX):  M19.041,M19.042  Previously Tried and Failed:    Rationale:      Insurance Name:  [employer]  Insurance ID:  [id number]      Pharmacy Information (if different than what is on RX)  [facility]:  [facility]  Phone:  [phone]

## 2019-06-17 NOTE — PROGRESS NOTES
Clinic Care Coordination Contact    Clinic Care Coordination Contact  OUTREACH    Referral Information:  Referral Source: PCP    Primary Diagnosis: Psychosocial    Chief Complaint   Patient presents with     Clinic Care Coordination - Post Hospital        [place] Utilization: 2 ED visits in the past 90 days.  Clinic Utilization  Difficulty keeping appointments:: No  Compliance Concerns: No  No-Show Concerns: No  No PCP office visit in Past Year: No  Utilization    Last refreshed: 6/17/2019  9:39 AM:  Hospital Admissions 1           Last refreshed: 6/17/2019  9:39 AM:  ED Visits 2           Last refreshed: 6/17/2019  9:39 AM:  No Show Count (past year) 0              Current as of: 6/17/2019  9:39 AM          Clinical Concerns:  Pt was recently om the hospital due to abdominal pain. Pt stated she is now feeling better and is back at her custodial. Pt is having an MRI today and a dye study tomorrow. She will be seeing PCP on 6/19.    Pt contacted CC SW to discuss progress on relocation to Nursing Home. No new information obtained since last outreach with pt.     Goals        General    Psychosocial (pt-stated)     Notes - Note created  6/12/2019 10:25 AM by Grazyna Padgett LGSW    Goal Statement: Within the next month, I want to move back into my previous home at [facility] for my overall wellbeing.      Measure of Success: Sonya will verbally inform CC SW of success.     Supportive Steps to Achieve: CC SW will provide ongoing assistance and follow up to determine next steps to succeed in goal     Barriers: Openings at Assisted Living Facility     Strengths: Sonya is very motivated to increase overall services that are included at [facility]     Date to Achieve By: 7/12/2019     Patient expressed understanding of goal: Sonya verbally stated understanding of goal        Patient/Caregiver understanding: Pt verbally stated understanding    Outreach Frequency: 2 weeks  Future Appointments              Today [facility],  [place], [facility] O    In 2 days Allan Casey MD [facility], OX    In 1 week Marely Robin MD Eye Clinic, [facility] CLIN    In 3 weeks Guanaco Kowalski MD [facility],  SLEEP      Plan: CC SW will follow up with [facility] to discuss Nursing Home availability and will contact pt after more information is gathered. Pt was reminded of CC SW contact information and encouraged to call with questions or concerns that arise before next outreach.    NAOMI Allan, [facility]  Clinic Care Coordinator  [facility] [place]  [phone]  [email or web address]

## 2019-06-18 NOTE — PROGRESS NOTES
Clinic Care Coordination Contact    Clinic Care Coordination Contact  OUTREACH    Referral Information:  Referral Source: PCP    Primary Diagnosis: Psychosocial    Chief Complaint   Patient presents with     Clinic Care Coordination - Post Hospital     Clinical Concerns:  Pt had a dye study today and determined that the pain pump in her back needs to be replaced. She will be undergoing this surgery in around 2 weeks.    CC SW informed her that no word has come from Lupe at [facility].    Goals        General    Psychosocial (pt-stated)     Notes - Note created  6/12/2019 10:25 AM by Grazyna Padgett LGSW    Goal Statement: Within the next month, I want to move back into my previous home at [facility] for my overall wellbeing.      Measure of Success: Sonya will verbally inform CC SW of success.     Supportive Steps to Achieve: CC SW will provide ongoing assistance and follow up to determine next steps to succeed in goal     Barriers: Openings at Assisted Living Facility     Strengths: Sonya is very motivated to increase overall services that are included at [facility]     Date to Achieve By: 7/12/2019     Patient expressed understanding of goal: Sonya verbally stated understanding of goal        Patient/Caregiver understanding: Pt verbally stated understanding    Outreach Frequency: 2 weeks  Future Appointments              Tomorrow Allan Casey MD [facility], OX    In 1 week Marely Robin MD Eye Clinic, [facility] CLIN    In 3 weeks Guanaco Kowalski MD [facility], [facility] SLEEP      Plan: CC SW will follow up when further information is known regarding transition to new LTC. Pt was reminded of CC SW contact information and encouraged to call with questions or concerns that arise before next outreach.    NAOMI Allan, SHIRIN  Clinic Care Coordinator  [facility] [place]  [phone]  [email or web address]

## 2019-06-18 NOTE — PROGRESS NOTES
Clinic Care Coordination Contact  Care Team Conversations    TAMARA BAUER spoke with Lupe, Resident Relations at [facility], regarding pt's transfer to [facility]. Lupe was agreeable to putting in the referral in as it is most appropriate for continuity of care. TAMARA BAUER relayed information needed in referral. She will outreach when it is complete, likely by tomorrow.    TAMARA BAUER spoke with pt to inform her of this plan. Pt will await further information.    Plan: TAMARA BAUER will follow up with pt when more information is known for pt's transition. Pt was reminded of TAMARA BAUER contact information and encouraged to call with questions or concerns that arise before next outreach.    NAOMI Allan, [facility]  Clinic Care Coordinator  [facility] [place]  [phone]  [email or web address]

## 2019-06-19 ENCOUNTER — MEDICAL CORRESPONDENCE (OUTPATIENT)
Dept: HEALTH INFORMATION MANAGEMENT | Facility: CLINIC | Age: 69
End: 2019-06-19

## 2019-06-19 ENCOUNTER — OFFICE VISIT (OUTPATIENT)
Dept: INTERNAL MEDICINE | Facility: CLINIC | Age: 69
End: 2019-06-19
Payer: COMMERCIAL

## 2019-06-19 VITALS
RESPIRATION RATE: 18 BRPM | WEIGHT: 130 LBS | TEMPERATURE: 98.9 F | DIASTOLIC BLOOD PRESSURE: 58 MMHG | HEART RATE: 88 BPM | SYSTOLIC BLOOD PRESSURE: 102 MMHG | OXYGEN SATURATION: 94 % | BODY MASS INDEX: 23.03 KG/M2

## 2019-06-19 DIAGNOSIS — Z09 HOSPITAL DISCHARGE FOLLOW-UP: Primary | ICD-10-CM

## 2019-06-19 DIAGNOSIS — Z89.612 S/P AKA (ABOVE KNEE AMPUTATION) BILATERAL (H): ICD-10-CM

## 2019-06-19 DIAGNOSIS — G43.809 OTHER MIGRAINE WITHOUT STATUS MIGRAINOSUS, NOT INTRACTABLE: ICD-10-CM

## 2019-06-19 DIAGNOSIS — E11.51 TYPE 2 DIABETES MELLITUS WITH DIABETIC PERIPHERAL ANGIOPATHY WITHOUT GANGRENE, WITHOUT LONG-TERM CURRENT USE OF INSULIN (H): ICD-10-CM

## 2019-06-19 DIAGNOSIS — Z89.611 S/P AKA (ABOVE KNEE AMPUTATION) BILATERAL (H): ICD-10-CM

## 2019-06-19 DIAGNOSIS — I69.359 CVA, OLD, HEMIPARESIS (H): ICD-10-CM

## 2019-06-19 DIAGNOSIS — F11.90 CHRONIC, CONTINUOUS USE OF OPIOIDS: ICD-10-CM

## 2019-06-19 DIAGNOSIS — G40.909 SEIZURE DISORDER (H): ICD-10-CM

## 2019-06-19 DIAGNOSIS — D57.3: ICD-10-CM

## 2019-06-19 DIAGNOSIS — R10.84 ABDOMINAL PAIN, GENERALIZED: ICD-10-CM

## 2019-06-19 DIAGNOSIS — I50.22 CHRONIC SYSTOLIC CONGESTIVE HEART FAILURE (H): ICD-10-CM

## 2019-06-19 PROBLEM — E66.01 MORBID OBESITY (H): Status: RESOLVED | Noted: 2018-05-09 | Resolved: 2019-06-19

## 2019-06-19 LAB
ANION GAP SERPL CALCULATED.3IONS-SCNC: 9 MMOL/L (ref 3–14)
BUN SERPL-MCNC: 15 MG/DL (ref 7–30)
CALCIUM SERPL-MCNC: 9 MG/DL (ref 8.5–10.1)
CHLORIDE SERPL-SCNC: 106 MMOL/L (ref 94–109)
CO2 SERPL-SCNC: 27 MMOL/L (ref 20–32)
CREAT SERPL-MCNC: 0.61 MG/DL (ref 0.52–1.04)
ERYTHROCYTE [DISTWIDTH] IN BLOOD BY AUTOMATED COUNT: 15.5 % (ref 10–15)
GFR SERPL CREATININE-BSD FRML MDRD: >90 ML/MIN/{1.73_M2}
GLUCOSE SERPL-MCNC: 117 MG/DL (ref 70–99)
HCT VFR BLD AUTO: 38.1 % (ref 35–47)
HGB BLD-MCNC: 12.6 G/DL (ref 11.7–15.7)
MCH RBC QN AUTO: 29.6 PG (ref 26.5–33)
MCHC RBC AUTO-ENTMCNC: 33.1 G/DL (ref 31.5–36.5)
MCV RBC AUTO: 89 FL (ref 78–100)
PLATELET # BLD AUTO: 376 10E9/L (ref 150–450)
POTASSIUM SERPL-SCNC: 3.6 MMOL/L (ref 3.4–5.3)
RBC # BLD AUTO: 4.26 10E12/L (ref 3.8–5.2)
SODIUM SERPL-SCNC: 142 MMOL/L (ref 133–144)
WBC # BLD AUTO: 12.3 10E9/L (ref 4–11)

## 2019-06-19 PROCEDURE — 36415 COLL VENOUS BLD VENIPUNCTURE: CPT | Performed by: INTERNAL MEDICINE

## 2019-06-19 PROCEDURE — 99214 OFFICE O/P EST MOD 30 MIN: CPT | Performed by: INTERNAL MEDICINE

## 2019-06-19 PROCEDURE — 85027 COMPLETE CBC AUTOMATED: CPT | Performed by: INTERNAL MEDICINE

## 2019-06-19 PROCEDURE — 80048 BASIC METABOLIC PNL TOTAL CA: CPT | Performed by: INTERNAL MEDICINE

## 2019-06-19 RX ORDER — ONDANSETRON 8 MG/1
8 TABLET, ORALLY DISINTEGRATING ORAL EVERY 8 HOURS PRN
Qty: 30 TABLET | Refills: 5 | Status: SHIPPED
Start: 2019-06-19 | End: 2019-06-20

## 2019-06-19 RX ORDER — OXYCODONE HYDROCHLORIDE 5 MG/1
TABLET ORAL
Refills: 0 | Status: ON HOLD | COMMUNITY
Start: 2019-06-18 | End: 2020-06-11

## 2019-06-19 RX ORDER — ONDANSETRON 8 MG/1
8 TABLET, ORALLY DISINTEGRATING ORAL EVERY 8 HOURS PRN
Qty: 30 TABLET | Refills: 5 | Status: SHIPPED | OUTPATIENT
Start: 2019-06-19 | End: 2019-06-19

## 2019-06-19 NOTE — Clinical Note
Please inform patient via phone the letter has been sent and that she will need a follow-up CBC in 1 to 2 weeks due to a slightly elevated white count from baseline.

## 2019-06-19 NOTE — PROGRESS NOTES
Subjective     Sonya Foote is a 69 year old female who presents to clinic today for the following health issues:    HPI   Hospital Follow-up Visit:    Hospital/Nursing Home/IP Rehab Facility: [facility]  Date of Admission: 6/14/19  Date of Discharge: 6/16/19  Reason(s) for Admission: GI bleed, nausea and vomiting             Problems taking medications regularly:  None       Medication changes since discharge: None       Problems adhering to non-medication therapy:  None    Summary of hospitalization:  [facility] discharge summary reviewed  Diagnostic Tests/Treatments reviewed.  Follow up needed: none  Other Healthcare Providers Involved in Patient s Care:         None  Update since discharge: stable.     Post Discharge Medication Reconciliation: discharge medications reconciled, continue medications without change.  Plan of care communicated with patient     Coding guidelines for this visit:  Type of Medical   Decision Making Face-to-Face Visit       within 7 Days of discharge Face-to-Face Visit        within 14 days of discharge   Moderate Complexity [id number]   High Complexity 50157 [id number]          Key management decisions made by me: Patient with multiple medical problems including chronic back pain and previous bilateral above-the-knee amputations.  She was admitted due to dark stools with nausea and vomiting and concern for GI bleed or ulcer.  Patient remained with stable hemoglobin throughout her time in the observation unit.  Pain and abdominal exam improved and at no time did she have a surgical exam.  She had ongoing back pain which she reports to me is chronic.  Was seen by the GI service please review the recommendations, no need for emergent endoscopy.  On the day of discharge she was reporting that she was feeling at her baseline, was tolerating a diet, was being maintained with a proton pump inhibitor and Carafate. Based on the clinical findings and the entire clinical  scenario, the patient appears stable at this time to be treated symptomatically, and to follow-up as an outpatient for any further evaluation and treatment.  Discussed expected course, need for follow up, and indications for return with the patient.  See discharge instructions.    Reviewed and updated as needed this visit by Provider     Other concerns:  1. Requesting Rx for Zofran   2. Patient states she needs a documented face to face encounter with provider for qualificating for new electronic wheel chair  3. Patient will be having her pain pump replaced at [facility] tomorrow- unsure if pre-op is required     Review of Systems   ROS COMP: CONSTITUTIONAL: NEGATIVE for fever, chills, change in weight  ENT/MOUTH: NEGATIVE for ear, mouth and throat problems  RESP: NEGATIVE for significant cough or SOB  CV: NEGATIVE for chest pain, palpitations or peripheral edema  : NEGATIVE for frequency, dysuria, or hematuria  MUSCULOSKELETAL: NEGATIVE for significant arthralgias or myalgia  NEURO: NEGATIVE for weakness, dizziness or paresthesias  HEME: NEGATIVE for bleeding problems  PSYCHIATRIC: NEGATIVE for changes in mood or affect      Objective    /58   Pulse 88   Temp 98.9  F (37.2  C) (Oral)   Resp 18   Wt 59 kg (130 lb)   SpO2 94%   BMI 23.03 kg/m    Body mass index is 23.03 kg/m .  Physical Exam   GENERAL: alert and no distress  EYES: Eyes grossly normal to inspection, PERRL and conjunctivae and sclerae normal  HENT: ear canals and TM's normal, nose and mouth without ulcers or lesions  NECK: no adenopathy, no asymmetry, masses, or scars and thyroid normal to palpation  RESP: lungs clear to auscultation - no rales, rhonchi or wheezes  CV: regular rate and rhythm, normal S1 S2, no S3 or S4, no click or rub, no peripheral edema and peripheral pulses strong.  MS: Patient is in a wheelchair with bilateral lower extremity leg amputations  PSYCH: mentation appears normal, affect normal/bright         Assessment & Plan     1. Hospital discharge follow-up  Stable post discharge currently without significant changes.  Patient apparently is scheduled to get a replaced pain pump through the pain clinic.  - Basic metabolic panel  - CBC with platelets    2. Abdominal pain, generalized  Resolution of symptoms at present, continue with as needed Zofran for nausea and recheck labs today  - Basic metabolic panel  - CBC with platelets  - ondansetron (ZOFRAN-ODT) 8 MG ODT tab; Take 1 tablet (8 mg) by mouth every 8 hours as needed nausea Dispense: 30 tablet; Refill: 5    3. Type 2 diabetes mellitus with diabetic peripheral angiopathy without gangrene, without long-term current use of insulin (H)  Lab Results   Component Value Date    A1C 5.0 04/17/2019    A1C 5.1 06/06/2018    A1C 4.4 05/02/2018    A1C 4.5 08/24/2017    A1C 4.1 01/24/2017     Stable on therapy continue with current medical management as noted.    4. Seizure disorder (H)  At present time without evidence of recurrence, continue with current therapy as directed    5. CVA, old, hemiparesis (H)  Minimal residual complaints at present time, speech fluent, continue with observation and risk reduction    6. Chronic systolic congestive heart failure (H)  Stable and appears euvolemic without any acute changes noted per medication.    7. S/P AKA (above knee amputation) bilateral (H)  No changes from baseline.    DME given prior  for lifted recliner for patient for home use.  This patient is a bilateral lower extremity above-the-knee amputee needs a lifting recliner in order to help mobilize from her chair to her wheelchair and also to help prevent bedsores.  She is currently wheelchair dependent.  This is not expected to improve in her lifetime and the requirement for a wheelchair and a lipid recliner will be indefinite.  The lifted recliner will also help in reducing the risk for buttock sores.    8. Other migraine without status migrainosus, not  intractable  Refilled for as needed use  - ondansetron (ZOFRAN-ODT) 8 MG ODT tab; Take 1 tablet (8 mg) by mouth every 8 hours as needed nausea  Dispense: 30 tablet; Refill: 5     Tobacco Cessation:   reports that she has been smoking cigarettes and cigars.  She has a 7.50 pack-year smoking history. She has never used smokeless tobacco.      See Patient Instructions    Return in about 3 months (around 9/19/2019) for BP Recheck, Routine Visit.    Allan Casey MD  [facility]

## 2019-06-19 NOTE — TELEPHONE ENCOUNTER
Central Prior Authorization Team   Phone: [phone]      PA Initiation    Medication: diclofenac (VOLTAREN) 1 % GEL topical gel  Insurance Company: [employer] ([facility]) - Phone [phone] Fax [phone]  Pharmacy Filling the Rx: [id number] - [place], [place] - [address] [place]  Filling Pharmacy Phone: [phone]  Filling Pharmacy Fax:    Start Date: 6/19/2019

## 2019-06-19 NOTE — PROGRESS NOTES
Clinic Care Coordination Contact    Clinic Care Coordination Contact  OUTREACH    Referral Information:  Referral Source: PCP    Primary Diagnosis: Psychosocial    Chief Complaint   Patient presents with     Clinic Care Coordination - Post Hospital     Clinical Concerns:  Pt contacted CC SW to inform that all referral paperwork had been sent to [facility].      Goals        General    Psychosocial (pt-stated)     Notes - Note created  6/12/2019 10:25 AM by Grazyna Padgett LGSW    Goal Statement: Within the next month, I want to move back into my previous home at [facility] for my overall wellbeing.      Measure of Success: Sonya will verbally inform CC SW of success.     Supportive Steps to Achieve: CC SW will provide ongoing assistance and follow up to determine next steps to succeed in goal     Barriers: Openings at Assisted Living Facility     Strengths: Sonya is very motivated to increase overall services that are included at [facility]     Date to Achieve By: 7/12/2019     Patient expressed understanding of goal: Sonya verbally stated understanding of goal        Outreach Frequency: 2 weeks  Future Appointments              In 1 week Marely Robin MD Eye Clinic, [facility] CLIN    In 2 weeks Guanaco Kowalski MD [facility], [facility] SLEEP      Plan: CC SW will follow up next week per pt request. Pt was reminded of CC SW contact information and encouraged to call with questions or concerns that arise before next outreach.    NAOMI Allan, SHIRIN  Clinic Care Coordinator  [facility] [place]  [phone]  [email or web address]

## 2019-06-19 NOTE — TELEPHONE ENCOUNTER
Prior Authorization Approval    Authorization Effective Date: 6/19/2019  Authorization Expiration Date: 12/31/2019  Medication: diclofenac (VOLTAREN) 1 % GEL topical gel  Approved Dose/Quantity:    Reference #: [id number]   Insurance Company: [employer] ([facility]) - Phone [phone] Fax [phone]  Expected CoPay:       CoPay Card Available:      Foundation Assistance Needed:    Which Pharmacy is filling the prescription (Not needed for infusion/clinic administered): [id number] [address] [facility]  Pharmacy Notified: Yes  Patient Notified: Yes **Instructed pharmacy to notify patient when script is ready to /ship.**

## 2019-06-19 NOTE — LETTER
[facility]  [address] [address]  [phone]      6/19/2019       Sonya Foote  [address]   [place] [address]        Dear Sonya,    Your basic panel remains stable.    Your hemoglobin also remained stable and has improved although your white blood cell count is slightly higher than your baseline.  This should be followed and at least repeated in a couple weeks for comparison.  I will place orders for you to get this done at your convenience by making a lab appointment.    Sincerely,      Allan Casey MD  Internal Medicine

## 2019-06-27 ENCOUNTER — OFFICE VISIT (OUTPATIENT)
Dept: OPHTHALMOLOGY | Facility: CLINIC | Age: 69
End: 2019-06-27
Attending: OPHTHALMOLOGY
Payer: COMMERCIAL

## 2019-06-27 DIAGNOSIS — H40.1133 PRIMARY OPEN ANGLE GLAUCOMA OF BOTH EYES, SEVERE STAGE: Primary | ICD-10-CM

## 2019-06-27 DIAGNOSIS — Z96.1 PSEUDOPHAKIA OF RIGHT EYE: ICD-10-CM

## 2019-06-27 PROCEDURE — G0463 HOSPITAL OUTPT CLINIC VISIT: HCPCS | Mod: ZF

## 2019-06-27 PROCEDURE — 92083 EXTENDED VISUAL FIELD XM: CPT | Mod: ZF | Performed by: OPHTHALMOLOGY

## 2019-06-27 PROCEDURE — 92133 CPTRZD OPH DX IMG PST SGM ON: CPT | Mod: ZF | Performed by: OPHTHALMOLOGY

## 2019-06-27 ASSESSMENT — TONOMETRY
OD_IOP_MMHG: 11
OS_IOP_MMHG: 13
IOP_METHOD: TONOPEN

## 2019-06-27 ASSESSMENT — REFRACTION_MANIFEST
OS_SPHERE: BALANCE
OD_SPHERE: -1.00
OD_CYLINDER: SPHERE

## 2019-06-27 ASSESSMENT — VISUAL ACUITY
OS_CC: NLP
OD_CC: 20/150
METHOD: SNELLEN - LINEAR

## 2019-06-27 ASSESSMENT — REFRACTION_WEARINGRX
SPECS_TYPE: TRIFOCAL
OD_CYLINDER: SPHERE
OD_SPHERE: -1.00
OD_ADD: +3.00
OS_CYLINDER: SPHERE
OS_ADD: +3.00
OS_SPHERE: -1.00

## 2019-06-27 ASSESSMENT — CONF VISUAL FIELD
OD_NORMAL: 1
OS_SUPERIOR_TEMPORAL_RESTRICTION: 1
OS_SUPERIOR_NASAL_RESTRICTION: 1
OS_INFERIOR_NASAL_RESTRICTION: 1
OS_INFERIOR_TEMPORAL_RESTRICTION: 1

## 2019-06-27 ASSESSMENT — EXTERNAL EXAM - LEFT EYE: OS_EXAM: NORMAL

## 2019-06-27 ASSESSMENT — EXTERNAL EXAM - RIGHT EYE: OD_EXAM: NORMAL

## 2019-06-27 ASSESSMENT — CUP TO DISC RATIO
OS_RATIO: 0.9
OD_RATIO: 0.8

## 2019-06-27 ASSESSMENT — SLIT LAMP EXAM - LIDS
COMMENTS: NORMAL
COMMENTS: NORMAL

## 2019-06-27 NOTE — PROGRESS NOTES
1)POAG/?LTG vs Glc Suspect -- s/p SLT OD (3/13/15), H/O negative head CT and MRI 2013,2014 for headaches, ?retinal vascular event in the right eye in the past.-- K pachy: 539/540   Tmax: OD:23    HVF: OD:[place] on LVC in 2016 (unable to transfer out of chair)     CDR: 0.7/0.9 (pallor OS>OD)    HRT/OCT: Severe RNFL thinning OS>OD       FHX of Glc: Father, grandparents -- lost vision, was on gtts     Gonio:       Intolerant to:      Asthma/COPD: Yes, on inhalers but tolerating topical and po BB   Steroid Use: No    Kidney Stones: No    Sulfa Allergy: No     IOP targets:  2)PCIOL OD -- vision loss in the right eye predates 2013 (pt believes she lost all vision in 2005) and she had several MRI's in 2013 including one with orbital sequences which did not reveal any structural change to cause a right optic neuropathy.  3)NS OS -- has had low vision eval with Manuela Mitchell  4)DM s   5)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  -- ?acute central retinal / ophthalmic artery thromboembolic event   6)H/O CVA  -- She is a severe vasculopath based on past medical history of several central nervous system strokes, coronary artery disease with CHF, severe hypertension, type II diabetes mellitus, and peripheral vascular disease.  7)COLLEEN    MD: pt moved back from [place]    Patient will continue on Cosopt (Timolol/Dorzolamide) which is a blue top drop 2x/day (12 hours apart) in the RIGHT EYE ONLY, Latanoprost which is a teal top drop at bedtime in both eyes, and Alphagan (Brimonidine) which is a purple top drop 2x/day (12 hours apart) in the RIGHT EYE ONLY.  Patient will return to clinic in 4-6 months with evaluation, dilated eye exam and IOP check.       Resident note (educational purposes, please refer to text above for final A&P):  IOP okay today.  VA stable, dropped one visit ago  Continue present management Copsopt, latanoprost, alphagan right eye   Patient no longer moving to [place], to continue cares here  Wants new glasses prescription  today because current ones are scratched  Follow-up 3 months repeat visual field consider 10-2 or following with snellen Paul W. Mallory M.D.  PGY-3, Ophthalmology       Attending Physician Attestation:  Complete documentation of historical and exam elements from today's encounter can be found in the full encounter summary report (not reduplicated in this progress note). I personally obtained the chief complaint(s) and history of present illness.  I confirmed and edited as necessary the review of systems, past medical/surgical history, family history, social history, and examination findings as documented by others; and I examined the patient myself. I personally reviewed the relevant tests, images, and reports as documented above. I formulated and edited as necessary the assessment and plan and discussed the findings and management plan with the patient and family.  - Marely Robin MD

## 2019-06-27 NOTE — NURSING NOTE
Patient hasn't been seen by our doctors in approx 1 year. Has been in [place] and never established care there. Has been using drops faithfully as prescribed. No pain or discomfort in either eye. Wants new glasses because her current pair are very scratched, knows they won't help her see any better    Ashley Snyder 10:14 AM

## 2019-06-27 NOTE — PATIENT INSTRUCTIONS
Patient will continue on Cosopt (Timolol/Dorzolamide) which is a blue top drop 2x/day (12 hours apart) in the RIGHT EYE ONLY, Latanoprost which is a teal top drop at bedtime in both eyes, and Alphagan (Brimonidine) which is a purple top drop 2x/day (12 hours apart) in the RIGHT EYE ONLY.  Patient will return to clinic in 4-6 months with evaluation, dilated eye exam and IOP check.

## 2019-06-28 ENCOUNTER — PATIENT OUTREACH (OUTPATIENT)
Dept: CARE COORDINATION | Facility: CLINIC | Age: 69
End: 2019-06-28

## 2019-06-28 ASSESSMENT — ACTIVITIES OF DAILY LIVING (ADL): DEPENDENT_IADLS:: CLEANING;COOKING;LAUNDRY;SHOPPING;MEAL PREPARATION;TRANSPORTATION

## 2019-07-01 ENCOUNTER — MEDICAL CORRESPONDENCE (OUTPATIENT)
Dept: HEALTH INFORMATION MANAGEMENT | Facility: CLINIC | Age: 69
End: 2019-07-01

## 2019-07-09 ENCOUNTER — OFFICE VISIT (OUTPATIENT)
Dept: SLEEP MEDICINE | Facility: CLINIC | Age: 69
End: 2019-07-09
Payer: COMMERCIAL

## 2019-07-09 ENCOUNTER — MEDICAL CORRESPONDENCE (OUTPATIENT)
Dept: HEALTH INFORMATION MANAGEMENT | Facility: CLINIC | Age: 69
End: 2019-07-09

## 2019-07-09 VITALS — OXYGEN SATURATION: 93 % | SYSTOLIC BLOOD PRESSURE: 126 MMHG | DIASTOLIC BLOOD PRESSURE: 70 MMHG | HEART RATE: 83 BPM

## 2019-07-09 DIAGNOSIS — G47.33 OSA (OBSTRUCTIVE SLEEP APNEA): ICD-10-CM

## 2019-07-09 DIAGNOSIS — G47.39 COMPLEX SLEEP APNEA SYNDROME: ICD-10-CM

## 2019-07-09 PROCEDURE — 99213 OFFICE O/P EST LOW 20 MIN: CPT | Performed by: INTERNAL MEDICINE

## 2019-07-09 NOTE — PROGRESS NOTES
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 Did have Restless Legs Syndrome before amputation but not anymore.     Sleep history:   10/26/2012 - Polysomnography at [facility] -  min; AHI 14; RDI 22; ERIN 5/hr; No PLMs or RBD.  11/16/2012 - Titration Polysomnography at [facility] - Started on CPAP and developed treatment-emergent central apneas. Adaptive Servo-Ventilation titration optimal but did well on CPAP 8 as well (at least REM supine per comments). Put on CPAP 8 cmH2O.   Reports on CPAP she is less sleepy and has energy during the day. Given mild disease, would only recommend treatment for symptom control.  Given EF <= 40% Adaptive Servo-Ventilation is now contraindicated in systolic CHF despite good control on testing.    2/26/2017 - Titration Polysomnography at the [facility].  Sleep efficiency 92.2%. Total sleep time 508 minutes.  Full night titration study.  CPAP 5 - 12 cmH2O tried.  Unacceptable titration due to high residual AHI, however, oxygen desaturations were controlled on CPAP of 10 cmH2O or higher, and looked better on CPAP of 12 cmH2O.    Reports CPAP broke and she needs a new one.  Since CPAP broke she's been exhausted and wants to get new machine ASAP.    Total score - [place]: 15 (7/9/2019  2:00 PM)    Reviewed by team: Tobacco  Allergies  Meds  Soc Hx      Reviewed by provider:        Problem List:  Patient Active Problem List    Diagnosis Date Noted     S/P AKA (above knee amputation) bilateral (H) 06/19/2019     Priority: Medium     Chronic, continuous use of opioids 06/19/2019     Priority: Medium      Tobacco abuse 06/26/2018     Priority: Medium     CVA, old, hemiparesis (H) 06/25/2018     Priority: Medium     Other migraine without status migrainosus, not intractable 03/20/2018     Priority: Medium     Incontinence 01/16/2018     Priority: Medium     Hyperlipidemia LDL goal <70 09/27/2017     Priority: Medium     Functional incontinence 07/19/2017     Priority: Medium     Personal history of urinary tract infection 07/19/2017     Priority: Medium     Bee sting reaction, undetermined intent, subsequent encounter 04/06/2017     Priority: Medium     Coronary artery disease of native artery of native heart with stable angina pectoris (H) 04/04/2017     Priority: Medium     Ischemic cardiomyopathy 04/04/2017     Priority: Medium     Complex sleep apnea syndrome 03/15/2017     Priority: Medium     JOSE (obstructive sleep apnea) 02/22/2017     Priority: Medium     10/26/2012 - Polysomnography at [facility] -  min; AHI 14; RDI 22; ERIN 5/hr; No PLMs or RBD.  11/16/2012 - Titration Polysomnography at [facility] - Started on CPAP and developed treatment-emergent central apneas.  Adaptive Servo-Ventilation titration optimal but did well on CPAP 8 as well. Put on CPAP 8 cmH2O.    Has systolic CHF, intrathecal narcotic pump, and treatment-emergent Central Sleep Apnea on CPAP.  Titration Study:  Sleep Study 2/26/2017 - (130.0 lbs) CPAP was titrated to a pressure of 12 with an AHI of 38.5.  Time Spent in REM supine at this pressure was 7.0       Primary open angle glaucoma of both eyes, severe stage 12/08/2016     Priority: Medium     Pseudophakia of right eye 12/08/2016     Priority: Medium     Cataract, left eye 12/08/2016     Priority: Medium     Diabetes mellitus type 2 without retinopathy (H) 12/08/2016     Priority: Medium     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 (H) 11/23/2016     Priority: Medium     Critical lower limb ischemia 11/07/2016     Priority: Medium     Anemia, unspecified type 10/22/2016      Priority: Medium     Type 2 diabetes mellitus with diabetic peripheral angiopathy without gangrene, without long-term current use of insulin (H) 10/12/2016     Priority: Medium     Essential hypertension 09/02/2016     Priority: Medium     Dysphagia 08/09/2016     Priority: Medium     Atherosclerotic peripheral vascular disease with rest pain (H) 06/06/2016     Priority: Medium     Osteoarthritis 05/19/2016     Priority: Medium     Pain in both upper extremities 05/19/2016     Priority: Medium     Stenosis of carotid artery 04/01/2016     Priority: Medium     Chronic systolic congestive heart failure (H) 03/08/2016     Priority: Medium     PAD (peripheral artery disease) (H) 09/14/2015     Priority: Medium     Cervicalgia 01/15/2015     Priority: Medium     GI bleed 08/21/2014     Priority: Medium     Intestinal malabsorption 08/06/2014     Priority: Medium     updating diagnosis code for icd10 cutover       Claudication in peripheral vascular disease (H) 04/22/2014     Priority: Medium     Person who has had sex change operation 01/20/2014     Priority: Medium     Vertigo 11/08/2013     Priority: Medium     AS (sickle cell trait) (H) 10/08/2013     Priority: Medium     Schizoaffective disorder (H) 06/07/2013     Priority: Medium     Osteoporosis 01/21/2013     Priority: Medium     Simple chronic bronchitis (H) 01/01/2013     Priority: Medium     Seizure disorder (H) 09/04/2012     Priority: Medium     Adjustment disorder with depressed mood 09/16/2009     Priority: Medium     Central retinal artery occlusion 08/19/2009     Priority: Medium     Anxiety disorder due to general medical condition 07/29/2009     Priority: Medium     Hx of colonic polyp 07/13/2009     Priority: Medium     Overview:   Colonoscopy completed on July 2009.  See report for full detail.  Please re refer in 5 years for repeat colon cancer screening if medically appropriate.  Need colyte 4/2 preparation.  Overview:   Colonoscopy completed on  July 2009.  See report for full detail.  Please re refer in 5 years for repeat colon cancer screening if medically appropriate.  Need colyte 4/2 preparation.       Hereditary and idiopathic peripheral neuropathy 07/10/2009     Priority: Medium     Peripheral neuropathy 07/10/2009     Priority: Medium     Androgen insensitivity syndrome 03/23/2009     Priority: Medium     Testicular feminization 03/23/2009     Priority: Medium     Chronic pain syndrome 03/20/2009     Priority: Medium     Patient is followed by Allan Casey for ongoing prescription of pain meds  All refills should be approved by this provider, or covering partner.    Maximum quantity per month: 1 month  Clinic visit frequency required: Q 6  months     Controlled substance agreement:  Encounter-Level CSA:     There are no encounter-level csa.        Pain Clinic evaluation in the past: No    DIRE Total Score(s):  No flowsheet data found.    Last [facility] website verification:  6/6/18   [email or web address]       Thoracic or lumbosacral neuritis or radiculitis 03/20/2009     Priority: Medium     Lumbosacral radiculitis 03/20/2009     Priority: Medium     Disorder of bone and cartilage 05/24/2007     Priority: Medium     Late effect of stroke 07/13/2006     Priority: Medium     Cerebral infarction due to occlusion or stenosis of carotid artery 02/06/2006     Priority: Medium     Problem list name updated by automated process. Provider to review       Iron deficiency anemia 11/18/2005     Priority: Medium     Degeneration of intervertebral disc of lumbosacral region 11/18/2005     Priority: Medium     Overview:   with radiculopathy  Overview:   with radiculopathy       Open-angle glaucoma 08/11/2003     Priority: Medium     Asthma 04/04/2003     Priority: Medium          /70   Pulse 83   SpO2 93%     Impression/Plan:    ICD-10-CM    1. Complex sleep apnea syndrome G47.31 [facility]   2. JOSE (obstructive sleep apnea) G47.33  [facility]       Mild sleep apnea. Current machine from 12/21/2012 is broken and patient needs replacement.  Please enroll in [facility] for replacement.    Restart CPAP ASAP and f/u in 2 months.     Fifteen minutes spent with patient, all of which were spent face-to-face counseling, consulting, coordinating plan of care.      Guanaco Kowalski MD    CC:  Allan Casey,

## 2019-07-15 ENCOUNTER — DOCUMENTATION ONLY (OUTPATIENT)
Dept: SLEEP MEDICINE | Facility: CLINIC | Age: 69
End: 2019-07-15
Payer: COMMERCIAL

## 2019-07-15 ENCOUNTER — MEDICAL CORRESPONDENCE (OUTPATIENT)
Dept: HEALTH INFORMATION MANAGEMENT | Facility: CLINIC | Age: 69
End: 2019-07-15

## 2019-07-15 DIAGNOSIS — G47.33 OSA (OBSTRUCTIVE SLEEP APNEA): ICD-10-CM

## 2019-07-15 DIAGNOSIS — G47.39 COMPLEX SLEEP APNEA SYNDROME: ICD-10-CM

## 2019-07-15 NOTE — PROGRESS NOTES
Patient was offered choice of vendor and chose [facility].  Sonya Foote was set up at [place] on July 15, 2019 Patient received a [employer]ie [employer] DreamStation CPAP. Pressures were set at 12 cm H2O.   Patient s ramp is 8 cm H2O for 30 min and FLEX/EPR is C Flex, 2.  Patient received a Resmed Mask name: Airfit F20 Full Face mask Size For Her, Medium, heated tubing and heated humidifier.  Patient is not enrolled in the [facility] Program and does not need to meet compliance. Patient has a follow up on 10/17/19 with Dr. Kowalski.    Ernestine Lagunas

## 2019-07-19 ENCOUNTER — PATIENT OUTREACH (OUTPATIENT)
Dept: CARE COORDINATION | Facility: CLINIC | Age: 69
End: 2019-07-19

## 2019-07-19 ASSESSMENT — ACTIVITIES OF DAILY LIVING (ADL): DEPENDENT_IADLS:: CLEANING;COOKING;LAUNDRY;SHOPPING;MEAL PREPARATION;TRANSPORTATION

## 2019-07-19 NOTE — PROGRESS NOTES
Clinic Care Coordination Contact    OUTREACH    Referral Information:  Referral Source: PCP    Primary Diagnosis: Psychosocial    Chief Complaint   Patient presents with     Clinic Care Coordination - Follow-up     Clinical Concerns:  CC JUANCARLOS spoke with pt about her satisfaction in her Nursing Home. Pt reported that she is happy there. She continues to share a room, which she says is ok and she gets a long well with her roommate. She continues to attend to her medical needs. She expressed no further needs at this time but will contact CC JUANCARLOS if anything comes up.    Resources and Interventions:  Community Resources: [facility]  Referrals Placed: [facility], [facility]      Future Appointments              In 2 weeks [address] [facility], [facility]    In 2 months Marely Robin MD Eye Clinic, [facility]    In 3 months Gustavo Wright MD [facility],  SLEEP      Plan: No further outreaches will be made at this time unless a new referral is made or a change in the pt's status occurs. Patient was provided with TAMARA BAUER contact information and encouraged to call with any questions or concerns.    NAOMI Allan, [facility]  Clinic Care Coordinator  [facility] [place]  [phone]  [email or web address]

## 2019-07-29 ENCOUNTER — MEDICAL CORRESPONDENCE (OUTPATIENT)
Dept: HEALTH INFORMATION MANAGEMENT | Facility: CLINIC | Age: 69
End: 2019-07-29

## 2019-08-05 ENCOUNTER — OFFICE VISIT (OUTPATIENT)
Dept: INTERNAL MEDICINE | Facility: CLINIC | Age: 69
End: 2019-08-05
Payer: COMMERCIAL

## 2019-08-05 VITALS
HEART RATE: 82 BPM | OXYGEN SATURATION: 98 % | TEMPERATURE: 98.2 F | SYSTOLIC BLOOD PRESSURE: 108 MMHG | DIASTOLIC BLOOD PRESSURE: 68 MMHG | WEIGHT: 140 LBS | RESPIRATION RATE: 15 BRPM | BODY MASS INDEX: 24.8 KG/M2

## 2019-08-05 DIAGNOSIS — J44.9 CHRONIC OBSTRUCTIVE PULMONARY DISEASE, UNSPECIFIED COPD TYPE (H): ICD-10-CM

## 2019-08-05 DIAGNOSIS — I69.359 CVA, OLD, HEMIPARESIS (H): ICD-10-CM

## 2019-08-05 DIAGNOSIS — E11.51 TYPE 2 DIABETES MELLITUS WITH DIABETIC PERIPHERAL ANGIOPATHY WITHOUT GANGRENE, WITHOUT LONG-TERM CURRENT USE OF INSULIN (H): ICD-10-CM

## 2019-08-05 DIAGNOSIS — R30.0 DYSURIA: Primary | ICD-10-CM

## 2019-08-05 LAB
ALBUMIN SERPL-MCNC: 2.9 G/DL (ref 3.4–5)
ALBUMIN UR-MCNC: NEGATIVE MG/DL
ALP SERPL-CCNC: 228 U/L (ref 40–150)
ALT SERPL W P-5'-P-CCNC: 77 U/L (ref 0–50)
APPEARANCE UR: CLEAR
AST SERPL W P-5'-P-CCNC: 50 U/L (ref 0–45)
BACTERIA #/AREA URNS HPF: ABNORMAL /HPF
BILIRUB DIRECT SERPL-MCNC: <0.1 MG/DL (ref 0–0.2)
BILIRUB SERPL-MCNC: 0.1 MG/DL (ref 0.2–1.3)
BILIRUB UR QL STRIP: NEGATIVE
COLOR UR AUTO: YELLOW
ERYTHROCYTE [DISTWIDTH] IN BLOOD BY AUTOMATED COUNT: 13.9 % (ref 10–15)
GLUCOSE UR STRIP-MCNC: NEGATIVE MG/DL
HCT VFR BLD AUTO: 36.7 % (ref 35–47)
HGB BLD-MCNC: 12.1 G/DL (ref 11.7–15.7)
HGB UR QL STRIP: ABNORMAL
KETONES UR STRIP-MCNC: NEGATIVE MG/DL
LEUKOCYTE ESTERASE UR QL STRIP: ABNORMAL
MCH RBC QN AUTO: 29.3 PG (ref 26.5–33)
MCHC RBC AUTO-ENTMCNC: 33 G/DL (ref 31.5–36.5)
MCV RBC AUTO: 89 FL (ref 78–100)
MUCOUS THREADS #/AREA URNS LPF: PRESENT /LPF
NITRATE UR QL: NEGATIVE
PH UR STRIP: 7 PH (ref 5–7)
PLATELET # BLD AUTO: 278 10E9/L (ref 150–450)
PROT SERPL-MCNC: 7.8 G/DL (ref 6.8–8.8)
RBC # BLD AUTO: 4.13 10E12/L (ref 3.8–5.2)
RBC #/AREA URNS AUTO: ABNORMAL /HPF
SOURCE: ABNORMAL
SP GR UR STRIP: <=1.005 (ref 1–1.03)
UROBILINOGEN UR STRIP-ACNC: 0.2 EU/DL (ref 0.2–1)
WBC # BLD AUTO: 9.7 10E9/L (ref 4–11)
WBC #/AREA URNS AUTO: ABNORMAL /HPF

## 2019-08-05 PROCEDURE — 80076 HEPATIC FUNCTION PANEL: CPT | Performed by: INTERNAL MEDICINE

## 2019-08-05 PROCEDURE — 36415 COLL VENOUS BLD VENIPUNCTURE: CPT | Performed by: INTERNAL MEDICINE

## 2019-08-05 PROCEDURE — 85027 COMPLETE CBC AUTOMATED: CPT | Performed by: INTERNAL MEDICINE

## 2019-08-05 PROCEDURE — 99214 OFFICE O/P EST MOD 30 MIN: CPT | Performed by: INTERNAL MEDICINE

## 2019-08-05 PROCEDURE — 81001 URINALYSIS AUTO W/SCOPE: CPT | Performed by: INTERNAL MEDICINE

## 2019-08-05 RX ORDER — CIPROFLOXACIN 250 MG/1
250 TABLET, FILM COATED ORAL 2 TIMES DAILY
Qty: 14 TABLET | Refills: 0 | Status: SHIPPED | OUTPATIENT
Start: 2019-08-05 | End: 2019-08-14

## 2019-08-05 RX ORDER — ALBUTEROL SULFATE 90 UG/1
2 AEROSOL, METERED RESPIRATORY (INHALATION) EVERY 6 HOURS PRN
Qty: 8.5 G | Refills: 11 | Status: SHIPPED | OUTPATIENT
Start: 2019-08-05 | End: 2020-03-12

## 2019-08-05 NOTE — PROGRESS NOTES
Subjective     Sonya Foote is a 69 year old female who presents to clinic today for the following health issues:    HPI     Patient here stating she needs a letter stating that Dr. Casey is her primary medical provider due to her insurance. She was sent to a different provider by her nursing facility (Dr. Ochoa, with [facility] practice) but was not happy with the care she received.     Other concerns:  1. Patient would like to discuss restarting pain patches  2. Urinary spasms 1-2 weeks. Patient would like to be checked for a UTI today. Requesting vaginal cream to help with urinary discomfort     Patient Active Problem List   Diagnosis     Seizure disorder (H)     Osteoporosis     Schizoaffective disorder (H)     AS (sickle cell trait) (H)     Vertigo     Person who has had sex change operation     Claudication in peripheral vascular disease (H)     Intestinal malabsorption     GI bleed     Cervicalgia     Asthma     Adjustment disorder with depressed mood     Chronic pain syndrome     Open-angle glaucoma     Hx of colonic polyp     Iron deficiency anemia     Late effect of stroke     Degeneration of intervertebral disc of lumbosacral region     Thoracic or lumbosacral neuritis or radiculitis     Cerebral infarction due to occlusion or stenosis of carotid artery     Disorder of bone and cartilage     Hereditary and idiopathic peripheral neuropathy     Androgen insensitivity syndrome     PAD (peripheral artery disease) (H)     Chronic systolic congestive heart failure (H)     Stenosis of carotid artery     Osteoarthritis     Pain in both upper extremities     Atherosclerotic peripheral vascular disease with rest pain (H)     Essential hypertension     Type 2 diabetes mellitus with diabetic peripheral angiopathy without gangrene, without long-term current use of insulin (H)     Anemia, unspecified type     Critical lower limb ischemia     Testicular feminization     Anxiety disorder due to general medical  condition     Central retinal artery occlusion     Lumbosacral radiculitis     Peripheral neuropathy     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 (H)     Primary open angle glaucoma of both eyes, severe stage     Pseudophakia of right eye     Cataract, left eye     Diabetes mellitus type 2 without retinopathy (H)     Simple chronic bronchitis (H)     JOSE (obstructive sleep apnea)     Complex sleep apnea syndrome     Coronary artery disease of native artery of native heart with stable angina pectoris (H)     Ischemic cardiomyopathy     Bee sting reaction, undetermined intent, subsequent encounter     Functional incontinence     Personal history of urinary tract infection     Dysphagia     Hyperlipidemia LDL goal <70     Incontinence     Other migraine without status migrainosus, not intractable     CVA, old, hemiparesis (H)     Tobacco abuse     S/P AKA (above knee amputation) bilateral (H)     Chronic, continuous use of opioids     Past Surgical History:   Procedure Laterality Date     AMPUTATE LEG ABOVE KNEE Left 6/11/2016    Procedure: AMPUTATE LEG ABOVE KNEE;  Surgeon: Mello Rodriguez MD;  [facility]     AMPUTATE LEG BELOW KNEE Right 11/7/2016    Procedure: AMPUTATE LEG BELOW KNEE;  Surgeon: Savannah Durant MD;  [facility]     AMPUTATE REVISION STUMP LOWER EXTREMITY Right 11/11/2016    Procedure: AMPUTATE REVISION STUMP LOWER EXTREMITY;  Surgeon: Savannah Durant MD;  [facility]     AMPUTATE REVISION STUMP LOWER EXTREMITY Right 11/16/2016    Procedure: AMPUTATE REVISION STUMP LOWER EXTREMITY;  Surgeon: Savannah Durant MD;  [facility]     AMPUTATE TOE(S) Right 1/5/2016    Procedure: AMPUTATE TOE(S);  Surgeon: Mello Gaines DPM;  Location: [facility]     ANGIOGRAM Bilateral 11/21/2014    Procedure: ANGIOGRAM;  Surgeon: Savannah Durant MD;  [facility]     ANGIOGRAM Left 1/16/2015    Procedure: ANGIOGRAM;  Surgeon: Savannah Durant MD;  [facility]     ANGIOGRAM Bilateral  9/14/2015    Procedure: ANGIOGRAM;  Surgeon: Savannah Durant MD;  [facility]     ANGIOGRAM Left 10/12/2015    Procedure: ANGIOGRAM;  Surgeon: Savannah Durant MD;  [facility]     ANGIOGRAM Right 6/6/2016    Procedure: ANGIOGRAM;  Surgeon: Savannah Durant MD;  [facility]     ANGIOPLASTY Right 6/6/2016    Procedure: ANGIOPLASTY;  Surgeon: Savannah Durant MD;  [facility]     APPENDECTOMY       BREAST SURGERY      right breast bx (benign)     CATARACT IOL, RT/LT Right      CHOLECYSTECTOMY       COLONOSCOPY N/A 8/25/2014    Procedure: COLONOSCOPY;  Surgeon: Mello Ferrer MD;  [facility]     COLONOSCOPY WITH CO2 INSUFFLATION N/A 8/20/2014    Procedure: COLONOSCOPY WITH CO2 INSUFFLATION;  Surgeon: Duane, William Charles, MD;  [facility]: [facility]     CYSTOSTOMY, INSERT TUBE SUPRAPUBIC, COMBINED N/A 1/16/2018    Procedure: COMBINED CYSTOSTOMY, INSERT TUBE SUPRAPUBIC;  Cystoscopy, Intraoperative Ultrasound, Suprapubic Tube Placement;  Surgeon: Keanu Dawson MD;  [facility]     ENDARTERECTOMY FEMORAL  5/23/2014    Procedure: ENDARTERECTOMY FEMORAL;  Surgeon: Jason Joshi MD;  [facility]     ESOPHAGOSCOPY, GASTROSCOPY, DUODENOSCOPY (EGD), COMBINED  12/14/2012    Procedure: COMBINED ESOPHAGOSCOPY, GASTROSCOPY, DUODENOSCOPY (EGD), BIOPSY SINGLE OR MULTIPLE;  ESOPHAGOSCOPY, GASTROSCOPY, DUODENOSCOPY (EGD), DILATATION ;  Surgeon: Elizabeth Stevenson MD;  Location:  GI     ESOPHAGOSCOPY, GASTROSCOPY, DUODENOSCOPY (EGD), COMBINED  12/31/2013    Procedure: COMBINED ESOPHAGOSCOPY, GASTROSCOPY, DUODENOSCOPY (EGD), BIOPSY SINGLE OR MULTIPLE;;  Surgeon: Clemente Lopez MD;  [facility]     ESOPHAGOSCOPY, GASTROSCOPY, DUODENOSCOPY (EGD), COMBINED  4/1/2014    Procedure: COMBINED ESOPHAGOSCOPY, GASTROSCOPY, DUODENOSCOPY (EGD);;  Surgeon: Clemente Lopez MD;  [facility]     ESOPHAGOSCOPY, GASTROSCOPY, DUODENOSCOPY (EGD), COMBINED  6/28/2014    Procedure: COMBINED ESOPHAGOSCOPY,  GASTROSCOPY, DUODENOSCOPY (EGD);  Surgeon: Clemente Lopez MD;  [facility]     ESOPHAGOSCOPY, GASTROSCOPY, DUODENOSCOPY (EGD), COMBINED N/A 8/20/2014    Procedure: COMBINED ESOPHAGOSCOPY, GASTROSCOPY, DUODENOSCOPY (EGD), BIOPSY SINGLE OR MULTIPLE;  Surgeon: Duane, William Charles, MD;  [facility]     ESOPHAGOSCOPY, GASTROSCOPY, DUODENOSCOPY (EGD), COMBINED N/A 8/22/2014    Procedure: COMBINED ESOPHAGOSCOPY, GASTROSCOPY, DUODENOSCOPY (EGD), BIOPSY SINGLE OR MULTIPLE;  Surgeon: Mello Ferrer MD;  [facility]     ESOPHAGOSCOPY, GASTROSCOPY, DUODENOSCOPY (EGD), COMBINED N/A 10/2/2014    Procedure: COMBINED ESOPHAGOSCOPY, GASTROSCOPY, DUODENOSCOPY (EGD), BIOPSY SINGLE OR MULTIPLE;  Surgeon: Remy Haskins MD;  [facility]     ESOPHAGOSCOPY, GASTROSCOPY, DUODENOSCOPY (EGD), COMBINED Left 12/15/2014    Procedure: COMBINED ESOPHAGOSCOPY, GASTROSCOPY, DUODENOSCOPY (EGD), BIOPSY SINGLE OR MULTIPLE;  Surgeon: Remy Haskins MD;  [facility]     ESOPHAGOSCOPY, GASTROSCOPY, DUODENOSCOPY (EGD), COMBINED N/A 2/25/2015    Procedure: COMBINED ENDOSCOPIC ULTRASOUND, ESOPHAGOSCOPY, GASTROSCOPY, DUODENOSCOPY (EGD), FINE NEEDLE ASPIRATE/BIOPSY;  Surgeon: Clemente Lugo MD;  [facility]     ESOPHAGOSCOPY, GASTROSCOPY, DUODENOSCOPY (EGD), COMBINED Left 2/25/2015    Procedure: COMBINED ESOPHAGOSCOPY, GASTROSCOPY, DUODENOSCOPY (EGD), BIOPSY SINGLE OR MULTIPLE;  Surgeon: Clemente Lugo MD;  [facility]     ESOPHAGOSCOPY, GASTROSCOPY, DUODENOSCOPY (EGD), COMBINED N/A 9/25/2016    Procedure: COMBINED ESOPHAGOSCOPY, GASTROSCOPY, DUODENOSCOPY (EGD);  Surgeon: Aziza Patiño MD;  [facility]     ESOPHAGOSCOPY, GASTROSCOPY, DUODENOSCOPY (EGD), COMBINED N/A 1/18/2017    Procedure: COMBINED ESOPHAGOSCOPY, GASTROSCOPY, DUODENOSCOPY (EGD), BIOPSY SINGLE OR MULTIPLE;  Surgeon: Clemente Lopez MD;  Location:  GI     ESOPHAGOSCOPY, GASTROSCOPY, DUODENOSCOPY (EGD), COMBINED N/A 11/26/2017     Procedure: COMBINED ESOPHAGOSCOPY, GASTROSCOPY, DUODENOSCOPY (EGD), REMOVE FOREIGN BODY;  Esophagogastroduodenoscopy with foreign body extraction  ;  Surgeon: Herberth Castrejon MD;  [facility]     ESOPHAGOSCOPY, GASTROSCOPY, DUODENOSCOPY (EGD), COMBINED N/A 11/26/2017    Procedure: COMBINED ESOPHAGOSCOPY, GASTROSCOPY, DUODENOSCOPY (EGD), REMOVE FOREIGN BODY;;  Surgeon: Herberth Castrejon MD;  [facility]     FASCIOTOMY LOWER EXTREMITY Left 6/10/2016    Procedure: FASCIOTOMY LOWER EXTREMITY;  Surgeon: Mello Rodriguez MD;  [facility]     HC CAPSULE ENDOSCOPY N/A 8/25/2014    Procedure: CAPSULE/PILL CAM ENDOSCOPY;  Surgeon: Remy Haskins MD;  [facility]     HC CAPSULE ENDOSCOPY N/A 10/2/2014    Procedure: CAPSULE/PILL CAM ENDOSCOPY;  Surgeon: Remy Haskins MD;  [facility]     ORTHOPEDIC SURGERY      broken wrist repair     SEX TRANSFORMATION SURGERY, MALE TO FEMALE      1974     SINUS SURGERY      cyst removed     TONSILLECTOMY       VASCULAR SURGERY      Left carotid stent       Social History     Tobacco Use     Smoking status: Current Every Day Smoker     Packs/day: 0.25     Years: 30.00     Pack years: 7.50     Types: Cigarettes, Cigars     Smokeless tobacco: Never Used   Substance Use Topics     Alcohol use: No     Alcohol/week: 0.0 oz     Family History   Problem Relation Age of Onset     Dementia Mother      Glaucoma Mother      Diabetes Mother         may have been type 1, childhood     Coronary Artery Disease Mother         MI     Glaucoma Father      Diabetes Father         may hev been type 1 - ??     Heart Failure Father      Cancer Maternal Aunt         leukemia     Schizophrenia Brother      Depression Brother      Suicide Sister      Depression Sister      Diabetes Sister      Cancer Maternal Aunt         ovarian     Glaucoma Maternal Grandmother      Diabetes Maternal Grandmother      Glaucoma Maternal Grandfather      Diabetes Maternal Grandfather       Glaucoma Paternal Grandmother      Diabetes Paternal Grandmother      Glaucoma Paternal Grandfather      Diabetes Paternal Grandfather      Breast Cancer Sister      Cerebrovascular Disease Brother      Colon Cancer No family hx of      Macular Degeneration No family hx of          Current Outpatient Medications   Medication Sig Dispense Refill     ACETAMINOPHEN PO Take 1,000 mg by mouth every 6 hours as needed for pain Not to exceed 4000 mg/day       ADVAIR DISKUS 250-50 MCG/DOSE diskus inhaler Inhale 1 puff into the lungs 2 times daily 60 Inhaler 11     albuterol (PROVENTIL) (2.5 MG/3ML) 0.083% neb solution INHALE 1 VIAL VIA NEBULIZER EVERY 6 HOURS AS NEEDED 360 mL 11     alendronate (FOSAMAX) 70 MG tablet Take 1 tablet (70 mg) by mouth with 8oz water every 7 days 30 minutes before breakfast and remain upright during this time. 12 tablet 3     aspirin EC 81 MG EC tablet Take 1 tablet (81 mg) by mouth daily 90 tablet 3     atorvastatin (LIPITOR) 40 MG tablet TAKE 1 TAB BY MOUTH ONCE DAILY 30 tablet 11     blood glucose monitoring (ONE TOUCH ULTRA 2) meter device kit Use to test blood sugars 3 times daily or as directed. 1 kit 0     blood glucose monitoring (ONE TOUCH ULTRA) test strip Use to test blood sugars 3 times daily or as directed. 3 Box 3     blood glucose monitoring (ONE TOUCH ULTRASOFT) lancets Use to test blood sugar 3 times daily or as directed. 100 each PRN     brimonidine (ALPHAGAN) 0.2 % ophthalmic solution Place 1 drop into the right eye 2 times daily 1 Bottle 11     cetirizine (ZYRTEC) 10 MG tablet Take 1 tablet (10 mg) by mouth every evening 30 tablet 3     Cholecalciferol (VITAMIN D) 2000 UNITS tablet Take 2,000 Units by mouth daily. 100 tablet 3     clotrimazole (LOTRIMIN) 1 % cream INSERT 1 APPLICATORFUL VAGINALLY TWICE A DAY FOR VAGINAL PAIN 12 g 0     diclofenac (VOLTAREN) 1 % GEL topical gel Apply 2 g topically 4 times daily to hands 100 g 11     dorzolamide (TRUSOPT) 2 % ophthalmic  solution Place 1 drop into the right eye 2 times daily 1 Bottle 11     EPINEPHrine (EPIPEN/ADRENACLICK/OR ANY BX GENERIC EQUIV) 0.3 MG/0.3ML injection 2-pack Inject 0.3 mLs (0.3 mg) into the muscle as needed for anaphylaxis 0.6 mL 1     escitalopram (LEXAPRO) 10 MG tablet TAKE 1 TAB BY MOUTH ONCE DAILY 30 tablet 11     ferrous sulfate (IRON) 325 (65 FE) MG tablet Take 1 tablet (325 mg) by mouth 2 times daily With meals 60 tablet 2     fluticasone (FLONASE) 50 MCG/ACT nasal spray Spray 1 spray into both nostrils daily       lactulose (CHRONULAC) 10 GM/15ML solution Take 20 g by mouth as needed for constipation       latanoprost (XALATAN) 0.005 % ophthalmic solution Place 1 drop into both eyes At Bedtime 2.5 mL 11     levETIRAcetam (KEPPRA) 500 MG tablet TAKE 1 TAB BY MOUTH TWICE A DAY 60 tablet 5     lisinopril (PRINIVIL/ZESTRIL) 20 MG tablet TAKE 1 TAB BY MOUTH ONCE DAILY 30 tablet 11     metFORMIN (GLUCOPHAGE) 500 MG tablet TAKE 1 TAB BY MOUTH IN THE EVENING WITH DINNER 30 tablet 5     metoprolol succinate ER (TOPROL-XL) 50 MG 24 hr tablet TAKE 1 TAB BY MOUTH ONCE DAILY 30 tablet 11     nicotine (NICODERM CQ) 14 MG/24HR 24 hr patch Place 1 patch onto the skin every 24 hours 30 patch 3     nitroglycerin (NITROSTAT) 0.4 MG SL tablet Place 1 tablet (0.4 mg) under the tongue every 5 minutes as needed for chest pain if you are still having symptoms after 3 doses (15 minutes) call 911. 25 tablet 1     ondansetron (ZOFRAN-ODT) 8 MG ODT tab Take 1 tablet (8 mg) by mouth every 8 hours as needed for nausea 30 tablet 0     order for DME Equipment being ordered: lift recliner 1 Device 0     order for DME Equipment being ordered: Hospital Bed with side rails 1 each 0     order for DME Equipment being ordered: Power Wheel Chair 1 Device 0     order for DME Equipment being ordered: bandage tape, prefer paper 1 Package 0     order for DME Full electric hospital bed with half rails    Dx: T79676, I110, J449  Length of need:  lifetime 1 Device 0     order for DME Wheel Chair Cushion: 18 x 18 inch Roho cushion 1 Device 0     order for DME Hospital bed with side rails 1 Device 0     order for DME Equipment being ordered: CPAP supplies mask, hose, filters, cushion    fax to [facility] at [phone] 10 Device prn     order for DME Equipment being ordered: CPAP supplies mask, hose, filters, cushion fax to [facility] at [phone]  Disp: 10 Device Refills: prn   Class: Local Print Start: 2/10/2017 1 Device 0     order for DME Equipment being ordered: Nebulizer and tubing supplies 1 Units 0     order for DME Equipment being ordered: Glucerna daily shakes with each meal 1 Box 11     ORDER FOR DME Equipment being ordered: Power Wheelchair 1 Device 0     ORDER FOR DME Equipment being ordered: Depends briefs 1 Month 11     oxyCODONE (ROXICODONE) 5 MG tablet TAKE 1 TABLET BY MOUTH EVERY 6 HOURS AS NEEDED  0     pantoprazole (PROTONIX) 40 MG EC tablet TAKE 1 TAB BY MOUTH ONCE DAILY 30 tablet 11     phenytoin (DILANTIN) 100 MG capsule TAKE 2 CAPS (200MG) BY MOUTH TWICE A  capsule 11     polyethylene glycol (MIRALAX/GLYCOLAX) Packet Take 17 g by mouth daily Dissolved in water or juice        pregabalin (LYRICA) 75 MG capsule Take 150 mg by mouth 3 times daily       risperiDONE (RISPERDAL) 0.5 MG tablet TAKE 1 TAB BY MOUTH AT BEDTIME 30 tablet 5     sennosides (SENOKOT) 8.6 MG tablet Take 1 tablet by mouth 2 times daily        solifenacin (VESICARE) 10 MG tablet TAKE 1 TAB BY MOUTH ONCE DAILY 30 tablet 11     sucralfate (CARAFATE) 1 GM tablet TAKE 1 TAB BY MOUTH FOUR TIMES DAILY. MAY DISSOLVE IN 10ML WATER ISNECESSARY 120 tablet 11     traZODone (DESYREL) 150 MG tablet TAKE 1 TAB BY MOUTH AT BEDTIME 30 tablet 11     umeclidinium (INCRUSE ELLIPTA) 62.5 MCG/INH inhaler Inhale 1 puff into the lungs daily 1 Inhaler 6     VENTOLIN  (90 Base) MCG/ACT inhaler Inhale 2 puffs into the lungs every 6 hours as needed for shortness of  breath / dyspnea or wheezing 8.5 g 11     Allergies   Allergen Reactions     Bee Venom      Penicillins Anaphylaxis     Other reaction(s): Skin Rash and/or Hives     Dilantin [Phenytoin] Other (See Comments)     Generic dilantin only per pt     Iodide Hives     09/11/15: Pt states she can use iodine and doesn't have any problems      Iodine-131      Novocaine [Procaine] Hives     Other reaction(s): Skin Rash and/or Hives     Tositumomab      BP Readings from Last 3 Encounters:   07/09/19 126/70   06/19/19 102/58   06/16/19 118/56    Wt Readings from Last 3 Encounters:   06/19/19 59 kg (130 lb)   06/14/19 63.8 kg (140 lb 9.6 oz)   05/29/19 62.1 kg (137 lb)            Reviewed and updated as needed this visit by Provider         Review of Systems   ROS COMP: CONSTITUTIONAL: NEGATIVE for fever, chills, change in weight  ENT/MOUTH: NEGATIVE for ear, mouth and throat problems  RESP: NEGATIVE for significant cough or SOB  CV: NEGATIVE for chest pain, palpitations or peripheral edema  GI: NEGATIVE for nausea, abdominal pain, heartburn, or change in bowel habits  : NEGATIVE for frequency, dysuria, or hematuria  MUSCULOSKELETAL: NEGATIVE for significant arthralgias or myalgia  NEURO: NEGATIVE for weakness, dizziness or paresthesias  HEME: NEGATIVE for bleeding problems  PSYCHIATRIC: NEGATIVE for changes in mood or affect      Objective    /68   Pulse 82   Temp 98.2  F (36.8  C) (Oral)   Resp 15   Wt 63.5 kg (140 lb)   SpO2 98%   BMI 24.80 kg/m    Body mass index is 24.8 kg/m .  Physical Exam   GENERAL: healthy, alert and no distress  EYES: Eyes grossly normal to inspection, PERRL and conjunctivae and sclerae normal  HENT: ear canals and TM's normal, nose and mouth without ulcers or lesions  NECK: no adenopathy, no asymmetry, masses, or scars and thyroid normal to palpation  RESP: lungs clear to auscultation - no rales, rhonchi or wheezes  BREAST: normal without masses, tenderness or nipple discharge and no  palpable axillary masses or adenopathy  CV: regular rate and rhythm, normal S1 S2, no S3 or S4, no murmur, click or rub, no peripheral edema and peripheral pulses strong  ABDOMEN: soft, nontender, no hepatosplenomegaly, no masses and bowel sounds normal  MS: Bilateral lower extremity above-the-knee amputations are noted.  SKIN: no suspicious lesions or rashes  NEURO: Patient is wheelchair-bound.  PSYCH: mentation appears normal, affect normal/bright    Lab Results   Component Value Date    A1C 5.0 04/17/2019    A1C 5.1 06/06/2018    A1C 4.4 05/02/2018    A1C 4.5 08/24/2017    A1C 4.1 01/24/2017             Assessment & Plan     1. Chronic obstructive pulmonary disease, unspecified COPD type (H)  Refilled inhaler for as needed use.  - VENTOLIN  (90 Base) MCG/ACT inhaler; Inhale 2 puffs into the lungs every 6 hours as needed for shortness of breath / dyspnea or wheezing  Dispense: 8.5 g; Refill: 11    2. Dysuria  Suggest rechecking urinalysis his symptoms are vague and nonspecific, empiric Cipro started  - *UA reflex to Microscopic and Culture ([facility] (except [place] and [place])  - ciprofloxacin (CIPRO) 250 MG tablet; Take 1 tablet (250 mg) by mouth 2 times daily  Dispense: 14 tablet; Refill: 0    3. Type 2 diabetes mellitus with diabetic peripheral angiopathy without gangrene, without long-term current use of insulin (H)  Stable with recheck A1c recommended 6 months from last.  - Hepatic panel    4. CVA, old, hemiparesis (H)  Inform patient will recheck CBC based on prior slightly elevated white count.  - CBC with platelets     Tobacco Cessation:   reports that she has been smoking cigarettes and cigars.  She has a 7.50 pack-year smoking history. She has never used smokeless tobacco.  Tobacco Cessation Action Plan: Information offered: Patient not interested at this time    See Patient Instructions    Return in about 3 months (around 11/5/2019) for diabetes check 6 months.    Allan FINK  MD [place]  [facility]

## 2019-08-05 NOTE — LETTER
[facility]  [address] [address]  [phone]      8/5/2019       Sonya Foote  [address]  [place] [place] [facility] [place] [address]        Dear Sonya,    Your white blood cell count and hemoglobin are completely normal.    Your urinalysis was nonspecific but did not suggest a significant urinary tract infection although there were moderate bacteria present.  I would continue with the antibiotics as we discussed and let me know if your symptoms do not improve.    Your liver function tests are slightly more abnormal and at this point we may want to consider some further testing for reassurance although your current levels were present in the past also and did improve at that time.    Sincerely,      Allan Casey MD  Internal Medicine

## 2019-08-06 ENCOUNTER — TELEPHONE (OUTPATIENT)
Dept: PULMONOLOGY | Facility: CLINIC | Age: 69
End: 2019-08-06

## 2019-08-06 ENCOUNTER — TELEPHONE (OUTPATIENT)
Dept: CARDIOLOGY | Facility: CLINIC | Age: 69
End: 2019-08-06

## 2019-08-06 DIAGNOSIS — Z72.0 CURRENT TOBACCO USE: ICD-10-CM

## 2019-08-06 RX ORDER — NICOTINE 21 MG/24HR
1 PATCH, TRANSDERMAL 24 HOURS TRANSDERMAL EVERY 24 HOURS
Qty: 30 PATCH | Refills: 3 | Status: ON HOLD | OUTPATIENT
Start: 2019-08-06 | End: 2020-06-11

## 2019-08-06 RX ORDER — NICOTINE 21 MG/24HR
1 PATCH, TRANSDERMAL 24 HOURS TRANSDERMAL EVERY 24 HOURS
Qty: 30 PATCH | Refills: 3 | Status: SHIPPED | OUTPATIENT
Start: 2019-08-06 | End: 2019-08-06

## 2019-08-06 NOTE — TELEPHONE ENCOUNTER
[facility] Call Center    Phone Message    May a detailed message be left on voicemail: yes    Reason for Call: Medication Refill Request      Name of medication being requested: Sonya is requesting Wellbutrin and patches to for smoking cessation.  She would like to wear two patches at time.  Provider who prescribed the medication: Dr. Aviles  Pharmacy: [facility]  Date medication is needed: Please fax orders asap         Action Taken: Message routed to:  [facility] p pulmonary

## 2019-08-06 NOTE — TELEPHONE ENCOUNTER
Spoke with patient and stated that would refill Nicoderm but that Wellbutrin would need to be refilled by PCP. Patient confirmed understanding.

## 2019-08-06 NOTE — TELEPHONE ENCOUNTER
Received call from patient stating she needs to know if Dr. Anderson was ok with her getting prosthetics for her bilateral above the knee amputation. Spoke with patient and she believes she needed cardiac clearance prior to getting fitted for prosthetics. RN inquired where patient was working with to get the prosthetics. Called [facility] rehab and spoke with Valeria regarding patient and she stated that they do not need cardiac clearance for prosthetics. They are unsure if patient qualifies for prosthetics with her comorbidities and circulation issues but they do not need anything from cardiology at this time. Spoke with patient and relayed the information received from Valeria at [facility]. Pt verbalized understanding.

## 2019-08-07 ENCOUNTER — ANCILLARY PROCEDURE (OUTPATIENT)
Dept: MAMMOGRAPHY | Facility: CLINIC | Age: 69
End: 2019-08-07
Attending: FAMILY MEDICINE
Payer: COMMERCIAL

## 2019-08-07 ENCOUNTER — TELEPHONE (OUTPATIENT)
Dept: INTERNAL MEDICINE | Facility: CLINIC | Age: 69
End: 2019-08-07

## 2019-08-07 DIAGNOSIS — Z12.31 VISIT FOR SCREENING MAMMOGRAM: ICD-10-CM

## 2019-08-07 DIAGNOSIS — I69.359 CVA, OLD, HEMIPARESIS (H): Primary | ICD-10-CM

## 2019-08-07 DIAGNOSIS — Z89.611 S/P AKA (ABOVE KNEE AMPUTATION) BILATERAL (H): ICD-10-CM

## 2019-08-07 DIAGNOSIS — Z89.612 S/P AKA (ABOVE KNEE AMPUTATION) BILATERAL (H): ICD-10-CM

## 2019-08-07 DIAGNOSIS — Z12.39 BREAST SCREENING, UNSPECIFIED: ICD-10-CM

## 2019-08-07 NOTE — TELEPHONE ENCOUNTER
CORWIN from patient, stating that she needs an order from Dr. Anderson for her prosthetics. Attempted to call patient back to discuss, patient did not answer. Left detailed message informing patient that orders for prosthetics would not be ordered by cardiology and that patient needs to speak with her PCP about an order.

## 2019-08-07 NOTE — TELEPHONE ENCOUNTER
Triage placed call to [facility]: [phone] to gather more information regarding PT order.    Per care center nursing staff- looks like patient has PT/OT orders for 3 x a week for 1 week per Dr. Ochoa.     Nursing staff stated if patient will need more orders, they will discuss with in house NP while patient is there.     Triage encouraged nursing staff to discuss this with patient and provide reassurance.     Valeria ZAMBRANO, RN, BSN, PHN

## 2019-08-07 NOTE — TELEPHONE ENCOUNTER
Spoke with patient and informed patient a physical order for prosthetics cannot come from cardiology. Instructed patient to contact her PCP. Patient verbalized understanding and agreed with plan of care.

## 2019-08-07 NOTE — TELEPHONE ENCOUNTER
Patient calling,     Requesting physical therapy orders while at [facility].  She would like orders faxed to: [phone]    Patient reports orders for overall deconditioning d/t lower amputee history.     Order pending. Please review, edit/ add, and sign if appropriate. Please have coordinator fax.    Valeria ZAMBRANO RN, BSN, PHN

## 2019-08-08 NOTE — TELEPHONE ENCOUNTER
Spoke with [facility] nursing staff again ( patient called this morning to check on PT orders from Primary office).    Nursing staff stated patient may have refused in house NP orders. They are going to check with DON if therapy orders are needing to come from primary. They will call back.    Valeria ZAMBRANO RN, BSN, PHN

## 2019-08-09 ENCOUNTER — TELEPHONE (OUTPATIENT)
Dept: INTERNAL MEDICINE | Facility: CLINIC | Age: 69
End: 2019-08-09

## 2019-08-09 DIAGNOSIS — Z89.611 S/P AKA (ABOVE KNEE AMPUTATION) BILATERAL (H): Primary | ICD-10-CM

## 2019-08-09 DIAGNOSIS — Z89.612 S/P AKA (ABOVE KNEE AMPUTATION) BILATERAL (H): Primary | ICD-10-CM

## 2019-08-09 NOTE — TELEPHONE ENCOUNTER
Patient called stating she would like to get an order for prosthetics.  Would like to have order sent to [facility].      Fax: [phone]

## 2019-08-12 ENCOUNTER — TELEPHONE (OUTPATIENT)
Dept: INTERNAL MEDICINE | Facility: CLINIC | Age: 69
End: 2019-08-12

## 2019-08-12 NOTE — TELEPHONE ENCOUNTER
Reason for Call:  Other call back    Detailed comments: Pt hung up before being triaged when making an apt. Pt is coming in to see her doctor because she hit her head,she states there is a large bump and it is causing migraines. Please call to triage.    Phone Number Patient can be reached at: Cell number on file:    Telephone Information:   Mobile [phone]       Best Time: asap    Can we leave a detailed message on this number? YES    Call taken on 8/12/2019 at 1:47 PM by Rosangela Soto

## 2019-08-13 NOTE — TELEPHONE ENCOUNTER
Left message for patient to call back. Also tried patient's son (on CTC) but he did not know about this appointment and said he will try to reach patient and have her call us back.

## 2019-08-14 ENCOUNTER — OFFICE VISIT (OUTPATIENT)
Dept: INTERNAL MEDICINE | Facility: CLINIC | Age: 69
End: 2019-08-14
Payer: COMMERCIAL

## 2019-08-14 VITALS
HEART RATE: 84 BPM | BODY MASS INDEX: 24.27 KG/M2 | OXYGEN SATURATION: 96 % | DIASTOLIC BLOOD PRESSURE: 66 MMHG | RESPIRATION RATE: 16 BRPM | TEMPERATURE: 98.4 F | SYSTOLIC BLOOD PRESSURE: 98 MMHG | WEIGHT: 137 LBS
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
## 2019-08-14 DIAGNOSIS — N32.89 BLADDER SPASM: ICD-10-CM

## 2019-08-14 DIAGNOSIS — E11.51 TYPE 2 DIABETES MELLITUS WITH DIABETIC PERIPHERAL ANGIOPATHY WITHOUT GANGRENE, WITHOUT LONG-TERM CURRENT USE OF INSULIN (H): ICD-10-CM

## 2019-08-14 DIAGNOSIS — E78.5 HYPERLIPIDEMIA LDL GOAL <100: ICD-10-CM

## 2019-08-14 DIAGNOSIS — Z72.0 TOBACCO ABUSE: ICD-10-CM

## 2019-08-14 PROCEDURE — 99214 OFFICE O/P EST MOD 30 MIN: CPT | Performed by: INTERNAL MEDICINE

## 2019-08-14 NOTE — PROGRESS NOTES
Subjective     Sonya Foote is a 69 year old female who presents to clinic today for the following health issues:    HPI   Fall       Duration: Occurred in May, patient hit back of head     Description (location/character/radiation): Patient states while she was at assisted living facility she fell out of he wheelchair and hit the back of her head. Has noticed a lump and tenderness in the back of the head since     Intensity:  moderate    Accompanying signs and symptoms: Hx of Migraines     History (similar episodes/previous evaluation): None    Precipitating or alleviating factors: None    Therapies tried and outcome: Tylenol, Ice      Other concerns:  1. Patient is requesting vaginal cream to help with bladder spasms- patient is not sure what medication is called. Patient would also like to discuss if oral medication is available for bladder spasms     Patient Active Problem List   Diagnosis     Seizure disorder (H)     Osteoporosis     Schizoaffective disorder (H)     AS (sickle cell trait) (H)     Vertigo     Person who has had sex change operation     Claudication in peripheral vascular disease (H)     Intestinal malabsorption     GI bleed     Cervicalgia     Asthma     Adjustment disorder with depressed mood     Chronic pain syndrome     Open-angle glaucoma     Hx of colonic polyp     Iron deficiency anemia     Late effect of stroke     Degeneration of intervertebral disc of lumbosacral region     Thoracic or lumbosacral neuritis or radiculitis     Cerebral infarction due to occlusion or stenosis of carotid artery     Disorder of bone and cartilage     Hereditary and idiopathic peripheral neuropathy     Androgen insensitivity syndrome     PAD (peripheral artery disease) (H)     Chronic systolic congestive heart failure (H)     Stenosis of carotid artery     Osteoarthritis     Pain in both upper extremities     Atherosclerotic peripheral vascular disease with rest pain (H)     Essential hypertension     Type 2  diabetes mellitus with diabetic peripheral angiopathy without gangrene, without long-term current use of insulin (H)     Anemia, unspecified type     Critical lower limb ischemia     Testicular feminization     Anxiety disorder due to general medical condition     Central retinal artery occlusion     Lumbosacral radiculitis     Peripheral neuropathy     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 (H)     Primary open angle glaucoma of both eyes, severe stage     Pseudophakia of right eye     Cataract, left eye     Diabetes mellitus type 2 without retinopathy (H)     Simple chronic bronchitis (H)     JOSE (obstructive sleep apnea)     Complex sleep apnea syndrome     Coronary artery disease of native artery of native heart with stable angina pectoris (H)     Ischemic cardiomyopathy     Bee sting reaction, undetermined intent, subsequent encounter     Functional incontinence     Personal history of urinary tract infection     Dysphagia     Hyperlipidemia LDL goal <70     Incontinence     Other migraine without status migrainosus, not intractable     CVA, old, hemiparesis (H)     Tobacco abuse     S/P AKA (above knee amputation) bilateral (H)     Chronic, continuous use of opioids     Past Surgical History:   Procedure Laterality Date     AMPUTATE LEG ABOVE KNEE Left 6/11/2016    Procedure: AMPUTATE LEG ABOVE KNEE;  Surgeon: Mello Rodriguez MD;  [facility]     AMPUTATE LEG BELOW KNEE Right 11/7/2016    Procedure: AMPUTATE LEG BELOW KNEE;  Surgeon: Savannah Durant MD;  [facility]     AMPUTATE REVISION STUMP LOWER EXTREMITY Right 11/11/2016    Procedure: AMPUTATE REVISION STUMP LOWER EXTREMITY;  Surgeon: Savannah Durant MD;  [facility]     AMPUTATE REVISION STUMP LOWER EXTREMITY Right 11/16/2016    Procedure: AMPUTATE REVISION STUMP LOWER EXTREMITY;  Surgeon: Savannah Durant MD;  [facility]     AMPUTATE TOE(S) Right 1/5/2016    Procedure: AMPUTATE TOE(S);  Surgeon: Mello Gaines DPM;   Location: [facility]     ANGIOGRAM Bilateral 11/21/2014    Procedure: ANGIOGRAM;  Surgeon: Savannah Durant MD;  [facility]     ANGIOGRAM Left 1/16/2015    Procedure: ANGIOGRAM;  Surgeon: Savannah Durant MD;  [facility]     ANGIOGRAM Bilateral 9/14/2015    Procedure: ANGIOGRAM;  Surgeon: Savannah Durant MD;  [facility]     ANGIOGRAM Left 10/12/2015    Procedure: ANGIOGRAM;  Surgeon: Savannah Durant MD;  [facility]     ANGIOGRAM Right 6/6/2016    Procedure: ANGIOGRAM;  Surgeon: Savannah Durant MD;  [facility]     ANGIOPLASTY Right 6/6/2016    Procedure: ANGIOPLASTY;  Surgeon: Savannah Durant MD;  [facility]     APPENDECTOMY       BREAST SURGERY      right breast bx (benign)     CATARACT IOL, RT/LT Right      CHOLECYSTECTOMY       COLONOSCOPY N/A 8/25/2014    Procedure: COLONOSCOPY;  Surgeon: Mello Ferrer MD;  [facility]     COLONOSCOPY WITH CO2 INSUFFLATION N/A 8/20/2014    Procedure: COLONOSCOPY WITH CO2 INSUFFLATION;  Surgeon: Duane, William Charles, MD;  Location: [facility]     CYSTOSTOMY, INSERT TUBE SUPRAPUBIC, COMBINED N/A 1/16/2018    Procedure: COMBINED CYSTOSTOMY, INSERT TUBE SUPRAPUBIC;  Cystoscopy, Intraoperative Ultrasound, Suprapubic Tube Placement;  Surgeon: Keanu Dawson MD;  [facility]     ENDARTERECTOMY FEMORAL  5/23/2014    Procedure: ENDARTERECTOMY FEMORAL;  Surgeon: Jason Joshi MD;  [facility]     ESOPHAGOSCOPY, GASTROSCOPY, DUODENOSCOPY (EGD), COMBINED  12/14/2012    Procedure: COMBINED ESOPHAGOSCOPY, GASTROSCOPY, DUODENOSCOPY (EGD), BIOPSY SINGLE OR MULTIPLE;  ESOPHAGOSCOPY, GASTROSCOPY, DUODENOSCOPY (EGD), DILATATION ;  Surgeon: Elizabeth Stevenson MD;  Location:  GI     ESOPHAGOSCOPY, GASTROSCOPY, DUODENOSCOPY (EGD), COMBINED  12/31/2013    Procedure: COMBINED ESOPHAGOSCOPY, GASTROSCOPY, DUODENOSCOPY (EGD), BIOPSY SINGLE OR MULTIPLE;;  Surgeon: Clemente Lopez MD;  [facility]     ESOPHAGOSCOPY, GASTROSCOPY, DUODENOSCOPY (EGD), COMBINED   4/1/2014    Procedure: COMBINED ESOPHAGOSCOPY, GASTROSCOPY, DUODENOSCOPY (EGD);;  Surgeon: Clemente Lopez MD;  [facility]     ESOPHAGOSCOPY, GASTROSCOPY, DUODENOSCOPY (EGD), COMBINED  6/28/2014    Procedure: COMBINED ESOPHAGOSCOPY, GASTROSCOPY, DUODENOSCOPY (EGD);  Surgeon: Clemente Lopez MD;  [facility]     ESOPHAGOSCOPY, GASTROSCOPY, DUODENOSCOPY (EGD), COMBINED N/A 8/20/2014    Procedure: COMBINED ESOPHAGOSCOPY, GASTROSCOPY, DUODENOSCOPY (EGD), BIOPSY SINGLE OR MULTIPLE;  Surgeon: Duane, William Charles, MD;  [facility]     ESOPHAGOSCOPY, GASTROSCOPY, DUODENOSCOPY (EGD), COMBINED N/A 8/22/2014    Procedure: COMBINED ESOPHAGOSCOPY, GASTROSCOPY, DUODENOSCOPY (EGD), BIOPSY SINGLE OR MULTIPLE;  Surgeon: Mello Ferrer MD;  [facility]     ESOPHAGOSCOPY, GASTROSCOPY, DUODENOSCOPY (EGD), COMBINED N/A 10/2/2014    Procedure: COMBINED ESOPHAGOSCOPY, GASTROSCOPY, DUODENOSCOPY (EGD), BIOPSY SINGLE OR MULTIPLE;  Surgeon: Remy Haskins MD;  [facility]     ESOPHAGOSCOPY, GASTROSCOPY, DUODENOSCOPY (EGD), COMBINED Left 12/15/2014    Procedure: COMBINED ESOPHAGOSCOPY, GASTROSCOPY, DUODENOSCOPY (EGD), BIOPSY SINGLE OR MULTIPLE;  Surgeon: Remy Haskins MD;  [facility]     ESOPHAGOSCOPY, GASTROSCOPY, DUODENOSCOPY (EGD), COMBINED N/A 2/25/2015    Procedure: COMBINED ENDOSCOPIC ULTRASOUND, ESOPHAGOSCOPY, GASTROSCOPY, DUODENOSCOPY (EGD), FINE NEEDLE ASPIRATE/BIOPSY;  Surgeon: Clemente Lugo MD;  [facility]     ESOPHAGOSCOPY, GASTROSCOPY, DUODENOSCOPY (EGD), COMBINED Left 2/25/2015    Procedure: COMBINED ESOPHAGOSCOPY, GASTROSCOPY, DUODENOSCOPY (EGD), BIOPSY SINGLE OR MULTIPLE;  Surgeon: Clemente Lugo MD;  [facility]     ESOPHAGOSCOPY, GASTROSCOPY, DUODENOSCOPY (EGD), COMBINED N/A 9/25/2016    Procedure: COMBINED ESOPHAGOSCOPY, GASTROSCOPY, DUODENOSCOPY (EGD);  Surgeon: Aziza Patiño MD;  Location: [facility]     ESOPHAGOSCOPY, GASTROSCOPY, DUODENOSCOPY (EGD),  COMBINED N/A 1/18/2017    Procedure: COMBINED ESOPHAGOSCOPY, GASTROSCOPY, DUODENOSCOPY (EGD), BIOPSY SINGLE OR MULTIPLE;  Surgeon: Clemente Lopez MD;  [facility]     ESOPHAGOSCOPY, GASTROSCOPY, DUODENOSCOPY (EGD), COMBINED N/A 11/26/2017    Procedure: COMBINED ESOPHAGOSCOPY, GASTROSCOPY, DUODENOSCOPY (EGD), REMOVE FOREIGN BODY;  Esophagogastroduodenoscopy with foreign body extraction  ;  Surgeon: Herberth Castrejon MD;  [facility]     ESOPHAGOSCOPY, GASTROSCOPY, DUODENOSCOPY (EGD), COMBINED N/A 11/26/2017    Procedure: COMBINED ESOPHAGOSCOPY, GASTROSCOPY, DUODENOSCOPY (EGD), REMOVE FOREIGN BODY;;  Surgeon: Herberth Castrejon MD;  [facility]     FASCIOTOMY LOWER EXTREMITY Left 6/10/2016    Procedure: FASCIOTOMY LOWER EXTREMITY;  Surgeon: Mello Rodriguez MD;  [facility]     HC CAPSULE ENDOSCOPY N/A 8/25/2014    Procedure: CAPSULE/PILL CAM ENDOSCOPY;  Surgeon: Remy Haskins MD;  [facility]     HC CAPSULE ENDOSCOPY N/A 10/2/2014    Procedure: CAPSULE/PILL CAM ENDOSCOPY;  Surgeon: Remy Haskins MD;  [facility]     ORTHOPEDIC SURGERY      broken wrist repair     SEX TRANSFORMATION SURGERY, MALE TO FEMALE      1974     SINUS SURGERY      cyst removed     TONSILLECTOMY       VASCULAR SURGERY      Left carotid stent       Social History     Tobacco Use     Smoking status: Current Every Day Smoker     Packs/day: 0.25     Years: 30.00     Pack years: 7.50     Types: Cigarettes, Cigars     Smokeless tobacco: Never Used   Substance Use Topics     Alcohol use: No     Alcohol/week: 0.0 oz     Family History   Problem Relation Age of Onset     Dementia Mother      Glaucoma Mother      Diabetes Mother         may have been type 1, childhood     Coronary Artery Disease Mother         MI     Glaucoma Father      Diabetes Father         may hev been type 1 - ??     Heart Failure Father      Cancer Maternal Aunt         leukemia     Schizophrenia Brother      Depression Brother       Suicide Sister      Depression Sister      Diabetes Sister      Cancer Maternal Aunt         ovarian     Glaucoma Maternal Grandmother      Diabetes Maternal Grandmother      Glaucoma Maternal Grandfather      Diabetes Maternal Grandfather      Glaucoma Paternal Grandmother      Diabetes Paternal Grandmother      Glaucoma Paternal Grandfather      Diabetes Paternal Grandfather      Breast Cancer Sister      Cerebrovascular Disease Brother      Colon Cancer No family hx of      Macular Degeneration No family hx of          Current Outpatient Medications   Medication Sig Dispense Refill     ADVAIR DISKUS 250-50 MCG/DOSE diskus inhaler Inhale 1 puff into the lungs 2 times daily 60 Inhaler 11     albuterol (PROVENTIL) (2.5 MG/3ML) 0.083% neb solution INHALE 1 VIAL VIA NEBULIZER EVERY 6 HOURS AS NEEDED 360 mL 11     alendronate (FOSAMAX) 70 MG tablet Take 1 tablet (70 mg) by mouth with 8oz water every 7 days 30 minutes before breakfast and remain upright during this time. 12 tablet 3     aspirin EC 81 MG EC tablet Take 1 tablet (81 mg) by mouth daily 90 tablet 3     atorvastatin (LIPITOR) 40 MG tablet TAKE 1 TAB BY MOUTH ONCE DAILY 30 tablet 11     brimonidine (ALPHAGAN) 0.2 % ophthalmic solution Place 1 drop into the right eye 2 times daily 1 Bottle 11     cetirizine (ZYRTEC) 10 MG tablet Take 1 tablet (10 mg) by mouth every evening 30 tablet 3     Cholecalciferol (VITAMIN D) 2000 UNITS tablet Take 2,000 Units by mouth daily. 100 tablet 3     dorzolamide (TRUSOPT) 2 % ophthalmic solution Place 1 drop into the right eye 2 times daily 1 Bottle 11     escitalopram (LEXAPRO) 10 MG tablet TAKE 1 TAB BY MOUTH ONCE DAILY 30 tablet 11     ferrous sulfate (IRON) 325 (65 FE) MG tablet Take 1 tablet (325 mg) by mouth 2 times daily With meals 60 tablet 2     fluticasone (FLONASE) 50 MCG/ACT nasal spray Spray 1 spray into both nostrils daily       lactulose (CHRONULAC) 10 GM/15ML solution Take 20 g by mouth as needed for  constipation       latanoprost (XALATAN) 0.005 % ophthalmic solution Place 1 drop into both eyes At Bedtime 2.5 mL 11     levETIRAcetam (KEPPRA) 500 MG tablet TAKE 1 TAB BY MOUTH TWICE A DAY 60 tablet 5     lisinopril (PRINIVIL/ZESTRIL) 20 MG tablet TAKE 1 TAB BY MOUTH ONCE DAILY 30 tablet 11     metFORMIN (GLUCOPHAGE) 500 MG tablet TAKE 1 TAB BY MOUTH IN THE EVENING WITH DINNER 30 tablet 5     metoprolol succinate ER (TOPROL-XL) 50 MG 24 hr tablet TAKE 1 TAB BY MOUTH ONCE DAILY 30 tablet 11     oxyCODONE (ROXICODONE) 5 MG tablet TAKE 1 TABLET BY MOUTH EVERY 6 HOURS AS NEEDED  0     pantoprazole (PROTONIX) 40 MG EC tablet TAKE 1 TAB BY MOUTH ONCE DAILY 30 tablet 11     phenytoin (DILANTIN) 100 MG capsule TAKE 2 CAPS (200MG) BY MOUTH TWICE A  capsule 11     polyethylene glycol (MIRALAX/GLYCOLAX) Packet Take 17 g by mouth daily Dissolved in water or juice        pregabalin (LYRICA) 75 MG capsule Take 150 mg by mouth 3 times daily       risperiDONE (RISPERDAL) 0.5 MG tablet TAKE 1 TAB BY MOUTH AT BEDTIME 30 tablet 5     sennosides (SENOKOT) 8.6 MG tablet Take 1 tablet by mouth 2 times daily        solifenacin (VESICARE) 10 MG tablet TAKE 1 TAB BY MOUTH ONCE DAILY 30 tablet 11     sucralfate (CARAFATE) 1 GM tablet TAKE 1 TAB BY MOUTH FOUR TIMES DAILY. MAY DISSOLVE IN 10ML WATER ISNECESSARY 120 tablet 11     traZODone (DESYREL) 150 MG tablet TAKE 1 TAB BY MOUTH AT BEDTIME 30 tablet 11     umeclidinium (INCRUSE ELLIPTA) 62.5 MCG/INH inhaler Inhale 1 puff into the lungs daily 1 Inhaler 6     VENTOLIN  (90 Base) MCG/ACT inhaler Inhale 2 puffs into the lungs every 6 hours as needed for shortness of breath / dyspnea or wheezing 8.5 g 11     ACETAMINOPHEN PO Take 1,000 mg by mouth every 6 hours as needed for pain Not to exceed 4000 mg/day       blood glucose monitoring (ONE TOUCH ULTRA 2) meter device kit Use to test blood sugars 3 times daily or as directed. 1 kit 0     blood glucose monitoring (ONE TOUCH ULTRA)  test strip Use to test blood sugars 3 times daily or as directed. 3 Box 3     blood glucose monitoring (ONE TOUCH ULTRASOFT) lancets Use to test blood sugar 3 times daily or as directed. 100 each PRN     clotrimazole (LOTRIMIN) 1 % cream INSERT 1 APPLICATORFUL VAGINALLY TWICE A DAY FOR VAGINAL PAIN 12 g 0     diclofenac (VOLTAREN) 1 % GEL topical gel Apply 2 g topically 4 times daily to hands 100 g 11     EPINEPHrine (EPIPEN/ADRENACLICK/OR ANY BX GENERIC EQUIV) 0.3 MG/0.3ML injection 2-pack Inject 0.3 mLs (0.3 mg) into the muscle as needed for anaphylaxis 0.6 mL 1     nicotine (NICODERM CQ) 14 MG/24HR 24 hr patch Place 1 patch onto the skin every 24 hours 30 patch 3     nitroglycerin (NITROSTAT) 0.4 MG SL tablet Place 1 tablet (0.4 mg) under the tongue every 5 minutes as needed for chest pain if you are still having symptoms after 3 doses (15 minutes) call 911. 25 tablet 1     ondansetron (ZOFRAN-ODT) 8 MG ODT tab Take 1 tablet (8 mg) by mouth every 8 hours as needed for nausea 30 tablet 0     order for DME Equipment being ordered: Prosthetic 1 each 0     order for DME Equipment being ordered: lift recliner 1 Device 0     order for DME Equipment being ordered: Hospital Bed with side rails 1 each 0     order for DME Equipment being ordered: Power Wheel Chair 1 Device 0     order for DME Equipment being ordered: bandage tape, prefer paper 1 Package 0     order for DME Full electric hospital bed with half rails    Dx: D97236, I110, J449  Length of need: lifetime 1 Device 0     order for DME Wheel Chair Cushion: 18 x 18 inch Roho cushion 1 Device 0     order for DME Hospital bed with side rails 1 Device 0     order for DME Equipment being ordered: CPAP supplies mask, hose, filters, cushion    fax to [facility] at [phone] 10 Device prn     order for DME Equipment being ordered: CPAP supplies mask, hose, filters, cushion fax to [facility] at [phone]  Disp: 10 Device Refills: prn   Class: Local  Print Start: 2/10/2017 1 Device 0     order for DME Equipment being ordered: Nebulizer and tubing supplies 1 Units 0     order for DME Equipment being ordered: Glucerna daily shakes with each meal 1 Box 11     ORDER FOR DME Equipment being ordered: Power Wheelchair 1 Device 0     ORDER FOR DME Equipment being ordered: Depends briefs 1 Month 11     Allergies   Allergen Reactions     Bee Venom      Penicillins Anaphylaxis     Other reaction(s): Skin Rash and/or Hives     Dilantin [Phenytoin] Other (See Comments)     Generic dilantin only per pt     Iodide Hives     09/11/15: Pt states she can use iodine and doesn't have any problems      Iodine-131      Novocaine [Procaine] Hives     Other reaction(s): Skin Rash and/or Hives     Tositumomab      BP Readings from Last 3 Encounters:   08/14/19 98/66   08/05/19 108/68   07/09/19 126/70    Wt Readings from Last 3 Encounters:   08/14/19 62.1 kg (137 lb)   08/05/19 63.5 kg (140 lb)   06/19/19 59 kg (130 lb)              Reviewed and updated as needed this visit by Provider         Review of Systems   ROS COMP: CONSTITUTIONAL: NEGATIVE for fever, chills, change in weight  ENT/MOUTH: NEGATIVE for ear, mouth and throat problems  RESP: NEGATIVE for significant cough or SOB  CV: NEGATIVE for chest pain, palpitations or peripheral edema  GI: NEGATIVE for nausea, abdominal pain, heartburn, or change in bowel habits  : NEGATIVE for frequency, dysuria, or hematuria  NEURO: NEGATIVE for weakness, dizziness or paresthesias  HEME: NEGATIVE for bleeding problems  PSYCHIATRIC: NEGATIVE for changes in mood or affect      Objective    BP 98/66   Pulse 84   Temp 98.4  F (36.9  C) (Oral)   Resp 16   Wt 62.1 kg (137 lb)   SpO2 96%   BMI 24.27 kg/m    There is no height or weight on file to calculate BMI.  Physical Exam   GENERAL: alert and no distress  EYES: Eyes grossly normal to inspection, PERRL and conjunctivae and sclerae normal  HENT: ear canals and TM's normal, nose and mouth  without ulcers or lesions  NECK: no adenopathy, no asymmetry, masses, or scars and thyroid normal to palpation  RESP: lungs clear to auscultation - no rales, rhonchi or wheezes  CV: regular rate and rhythm, normal S1 S2, no S3 or S4, no click or rub  MS: bilateral amputees  NEURO: No focal changes  PSYCH: mentation appears normal, affect normal/bright  Evaluation of the posterior cranial area demonstrates no obvious superficial swelling, ecchymosis or distinct tenderness.  One could may be imagine a little bit of upper cervical muscle tenderness but this is very minimal.  There is also no midline tenderness.        Assessment & Plan     1. Fall, subsequent encounter  No obvious residual functional change seen.  Question mild soft tissue injury notable to upper cervical muscles, suggest observation    2. Bladder spasm  This patient has been seen in the urology clinic by Dr. BECKY banegas and I have suggested that she follow-up with them due to the fact that it was recommended she may be a candidate for further urodynamic studies and/or Botox injection    3. Hyperlipidemia LDL goal <100  Labs ordered as fasting per routine and orders have been placed.  A letter is also been sent to remind the patient to make lab appointment  - Lipid Profile; Future    4. Type 2 diabetes mellitus with diabetic peripheral angiopathy without gangrene, without long-term current use of insulin (H)  Stable with A1c level due 6 months from last which would be October 2019, lab orders placed  - Hemoglobin A1c; Future    5. Tobacco abuse  Smoking cessation was advised and the risks of continued smoking in regards to this patients health history was reiterated. Options of smoking cessation were also discussed. This time extended beyond the routine exam.  She has a 7.50 pack-year smoking history. She has never used smokeless tobacco.  Tobacco Cessation Action Plan: Information offered: Patient not interested at this time    See Patient  Instructions    Return in about 3 months (around 11/14/2019) for Lab Work appointment, if symptoms recur or worsen, follow-up with Urology for formal assessment.    Allan Casey MD  [facility]

## 2019-08-14 NOTE — LETTER
[facility]  [address] [address]  [phone]      8/14/2019       Sonya Foote  [address]  [place] [address]        Dear Sonya,    Just a reminder that you are due for your A1c level to be rechecked in October as this should be done every 6 months to follow-up in regards to your diabetes.  You are also due for your cholesterol and I feel it is okay to wait and do it at that same time.    I have placed orders in the computer for you for the month of October for both of these tests and you may make a lab appointment at your convenience at that time.  Please note you should fast for at least 8 hours prior to these tests.    Sincerely,      Allan Casey MD  Internal Medicine

## 2019-08-14 NOTE — TELEPHONE ENCOUNTER
Patient has appointment with Dr. Casey this AM. Have not been able to reach her to triage symptoms thus far. Will await to make sure she comes to appt.

## 2019-08-19 NOTE — TELEPHONE ENCOUNTER
[facility] never received fax so patient wants to  script at IM ,  Script placed at .

## 2019-08-22 ENCOUNTER — TELEPHONE (OUTPATIENT)
Dept: INTERNAL MEDICINE | Facility: CLINIC | Age: 69
End: 2019-08-22

## 2019-08-22 DIAGNOSIS — Z89.612 S/P AKA (ABOVE KNEE AMPUTATION) BILATERAL (H): Primary | ICD-10-CM

## 2019-08-22 DIAGNOSIS — Z89.611 S/P AKA (ABOVE KNEE AMPUTATION) BILATERAL (H): Primary | ICD-10-CM
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
## 2019-08-22 NOTE — TELEPHONE ENCOUNTER
Call from [facility] [phone]. Order needed from PCP whom Sonya says is Dr. Casey order for OT/PT evaluation this can be done at facility and order for Prosthetic Legs which have been approved . Please fax to [phone].

## 2019-08-26 ENCOUNTER — TELEPHONE (OUTPATIENT)
Dept: INTERNAL MEDICINE | Facility: CLINIC | Age: 69
End: 2019-08-26

## 2019-08-26 NOTE — TELEPHONE ENCOUNTER
Toyin from [place] calling needing order for PT to eval and treat for fitting of prosthetics and gait. Will do eval and send POC. Also asking for order for OT to eval and treat. Has power wheelchair coming and will do education and safety planning. This is not homecare. Can take a verbal order. Ok to approve?

## 2019-08-27 ENCOUNTER — ANCILLARY PROCEDURE (OUTPATIENT)
Dept: GENERAL RADIOLOGY | Facility: CLINIC | Age: 69
End: 2019-08-27
Attending: INTERNAL MEDICINE
Payer: COMMERCIAL

## 2019-08-27 ENCOUNTER — OFFICE VISIT (OUTPATIENT)
Dept: INTERNAL MEDICINE | Facility: CLINIC | Age: 69
End: 2019-08-27
Payer: COMMERCIAL

## 2019-08-27 VITALS
SYSTOLIC BLOOD PRESSURE: 126 MMHG | DIASTOLIC BLOOD PRESSURE: 78 MMHG | OXYGEN SATURATION: 98 % | WEIGHT: 137 LBS | HEART RATE: 80 BPM | BODY MASS INDEX: 24.27 KG/M2 | RESPIRATION RATE: 16 BRPM | TEMPERATURE: 97.8 F

## 2019-08-27 DIAGNOSIS — G43.809 OTHER MIGRAINE WITHOUT STATUS MIGRAINOSUS, NOT INTRACTABLE: ICD-10-CM

## 2019-08-27 DIAGNOSIS — R11.0 NAUSEA: ICD-10-CM

## 2019-08-27 DIAGNOSIS — R07.89 CHEST WALL PAIN: ICD-10-CM

## 2019-08-27 DIAGNOSIS — R07.89 CHEST WALL PAIN: Primary | ICD-10-CM

## 2019-08-27 PROCEDURE — 71046 X-RAY EXAM CHEST 2 VIEWS: CPT

## 2019-08-27 PROCEDURE — 99214 OFFICE O/P EST MOD 30 MIN: CPT | Performed by: INTERNAL MEDICINE

## 2019-08-27 RX ORDER — ONDANSETRON 8 MG/1
8 TABLET, ORALLY DISINTEGRATING ORAL EVERY 8 HOURS PRN
Qty: 30 TABLET | Refills: 0 | Status: SHIPPED | OUTPATIENT
Start: 2019-08-27 | End: 2020-02-15

## 2019-08-27 NOTE — PROGRESS NOTES
Subjective   Chief Complaint   Patient presents with     Forms     Sonya Foote is a 69 year old female who presents to clinic today for the following health issues:    HPI     Patient is here today because she needs a refill of her Zofran which she uses for intermittent nausea associated with her migraines as well as her chronic pain.    She also been having some left-sided lower lateral chest wall pain is been for about a week or so.  There is been no obvious trauma although she is using a manual wheelchair while she is waiting for her electronic wheelchair to be built.  Because of this she is at use a lot of her upper arm strength in order to move herself within her wheelchair.  She denies any fevers chills night sweats cough or productive sputum or any obvious trauma or precipitating event.  She is also been doing some physical therapy which may be contributing to some of her issues as she is using muscles of her upper extremity more than she has had 2 in the past.    She does have a history of chronic pain and takes ongoing pain medicines followed in the pain clinic.    Patient Active Problem List   Diagnosis     Seizure disorder (H)     Osteoporosis     Schizoaffective disorder (H)     AS (sickle cell trait) (H)     Vertigo     Person who has had sex change operation     Claudication in peripheral vascular disease (H)     Intestinal malabsorption     GI bleed     Cervicalgia     Asthma     Adjustment disorder with depressed mood     Chronic pain syndrome     Open-angle glaucoma     Hx of colonic polyp     Iron deficiency anemia     Late effect of stroke     Degeneration of intervertebral disc of lumbosacral region     Thoracic or lumbosacral neuritis or radiculitis     Cerebral infarction due to occlusion or stenosis of carotid artery     Disorder of bone and cartilage     Hereditary and idiopathic peripheral neuropathy     Androgen insensitivity syndrome     PAD (peripheral artery disease) (H)     Chronic  systolic congestive heart failure (H)     Stenosis of carotid artery     Osteoarthritis     Pain in both upper extremities     Atherosclerotic peripheral vascular disease with rest pain (H)     Essential hypertension     Type 2 diabetes mellitus with diabetic peripheral angiopathy without gangrene, without long-term current use of insulin (H)     Anemia, unspecified type     Critical lower limb ischemia     Testicular feminization     Anxiety disorder due to general medical condition     Central retinal artery occlusion     Lumbosacral radiculitis     Peripheral neuropathy     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 (H)     Primary open angle glaucoma of both eyes, severe stage     Pseudophakia of right eye     Cataract, left eye     Diabetes mellitus type 2 without retinopathy (H)     Simple chronic bronchitis (H)     JOSE (obstructive sleep apnea)     Complex sleep apnea syndrome     Coronary artery disease of native artery of native heart with stable angina pectoris (H)     Ischemic cardiomyopathy     Bee sting reaction, undetermined intent, subsequent encounter     Functional incontinence     Personal history of urinary tract infection     Dysphagia     Hyperlipidemia LDL goal <70     Incontinence     Other migraine without status migrainosus, not intractable     CVA, old, hemiparesis (H)     Tobacco abuse     S/P AKA (above knee amputation) bilateral (H)     Chronic, continuous use of opioids     Bladder spasm     Past Surgical History:   Procedure Laterality Date     AMPUTATE LEG ABOVE KNEE Left 6/11/2016    Procedure: AMPUTATE LEG ABOVE KNEE;  Surgeon: Mello Rodriguez MD;  [facility]     AMPUTATE LEG BELOW KNEE Right 11/7/2016    Procedure: AMPUTATE LEG BELOW KNEE;  Surgeon: Savannah Durant MD;  [facility]     AMPUTATE REVISION STUMP LOWER EXTREMITY Right 11/11/2016    Procedure: AMPUTATE REVISION STUMP LOWER EXTREMITY;  Surgeon: Savannah Durant MD;  [facility]     AMPUTATE  REVISION STUMP LOWER EXTREMITY Right 11/16/2016    Procedure: AMPUTATE REVISION STUMP LOWER EXTREMITY;  Surgeon: Savannah Durant MD;  [facility]     AMPUTATE TOE(S) Right 1/5/2016    Procedure: AMPUTATE TOE(S);  Surgeon: Mello Gaines DPM;  Location:  [facility]     ANGIOGRAM Bilateral 11/21/2014    Procedure: ANGIOGRAM;  Surgeon: Savannah Durant MD;  [facility]     ANGIOGRAM Left 1/16/2015    Procedure: ANGIOGRAM;  Surgeon: Savannah Durant MD;  [facility]     ANGIOGRAM Bilateral 9/14/2015    Procedure: ANGIOGRAM;  Surgeon: Savannah Durant MD;  [facility]     ANGIOGRAM Left 10/12/2015    Procedure: ANGIOGRAM;  Surgeon: Savannah Durant MD;  [facility]     ANGIOGRAM Right 6/6/2016    Procedure: ANGIOGRAM;  Surgeon: Savannah Durant MD;  [facility]     ANGIOPLASTY Right 6/6/2016    Procedure: ANGIOPLASTY;  Surgeon: Savannah Durant MD;  [facility]     APPENDECTOMY       BREAST SURGERY      right breast bx (benign)     CATARACT IOL, RT/LT Right      CHOLECYSTECTOMY       COLONOSCOPY N/A 8/25/2014    Procedure: COLONOSCOPY;  Surgeon: Mello Ferrer MD;  Location:  [facility]     COLONOSCOPY WITH CO2 INSUFFLATION N/A 8/20/2014    Procedure: COLONOSCOPY WITH CO2 INSUFFLATION;  Surgeon: Duane, William Charles, MD;  [facility]: [facility]     CYSTOSTOMY, INSERT TUBE SUPRAPUBIC, COMBINED N/A 1/16/2018    Procedure: COMBINED CYSTOSTOMY, INSERT TUBE SUPRAPUBIC;  Cystoscopy, Intraoperative Ultrasound, Suprapubic Tube Placement;  Surgeon: Keanu Dawson MD;  [facility]     ENDARTERECTOMY FEMORAL  5/23/2014    Procedure: ENDARTERECTOMY FEMORAL;  Surgeon: Jason Joshi MD;  [facility]     ESOPHAGOSCOPY, GASTROSCOPY, DUODENOSCOPY (EGD), COMBINED  12/14/2012    Procedure: COMBINED ESOPHAGOSCOPY, GASTROSCOPY, DUODENOSCOPY (EGD), BIOPSY SINGLE OR MULTIPLE;  ESOPHAGOSCOPY, GASTROSCOPY, DUODENOSCOPY (EGD), DILATATION ;  Surgeon: Elizabeth Stevenson MD;  Location: [facility]     ESOPHAGOSCOPY, GASTROSCOPY,  DUODENOSCOPY (EGD), COMBINED  12/31/2013    Procedure: COMBINED ESOPHAGOSCOPY, GASTROSCOPY, DUODENOSCOPY (EGD), BIOPSY SINGLE OR MULTIPLE;;  Surgeon: Clemente Lopez MD;  [facility]     ESOPHAGOSCOPY, GASTROSCOPY, DUODENOSCOPY (EGD), COMBINED  4/1/2014    Procedure: COMBINED ESOPHAGOSCOPY, GASTROSCOPY, DUODENOSCOPY (EGD);;  Surgeon: Clemente Lopez MD;  [facility]     ESOPHAGOSCOPY, GASTROSCOPY, DUODENOSCOPY (EGD), COMBINED  6/28/2014    Procedure: COMBINED ESOPHAGOSCOPY, GASTROSCOPY, DUODENOSCOPY (EGD);  Surgeon: Clemente Lopez MD;  [facility]     ESOPHAGOSCOPY, GASTROSCOPY, DUODENOSCOPY (EGD), COMBINED N/A 8/20/2014    Procedure: COMBINED ESOPHAGOSCOPY, GASTROSCOPY, DUODENOSCOPY (EGD), BIOPSY SINGLE OR MULTIPLE;  Surgeon: Duane, William Charles, MD;  Location:  OR     ESOPHAGOSCOPY, GASTROSCOPY, DUODENOSCOPY (EGD), COMBINED N/A 8/22/2014    Procedure: COMBINED ESOPHAGOSCOPY, GASTROSCOPY, DUODENOSCOPY (EGD), BIOPSY SINGLE OR MULTIPLE;  Surgeon: Mello Ferrer MD;  [facility]     ESOPHAGOSCOPY, GASTROSCOPY, DUODENOSCOPY (EGD), COMBINED N/A 10/2/2014    Procedure: COMBINED ESOPHAGOSCOPY, GASTROSCOPY, DUODENOSCOPY (EGD), BIOPSY SINGLE OR MULTIPLE;  Surgeon: Remy Haskins MD;  [facility]     ESOPHAGOSCOPY, GASTROSCOPY, DUODENOSCOPY (EGD), COMBINED Left 12/15/2014    Procedure: COMBINED ESOPHAGOSCOPY, GASTROSCOPY, DUODENOSCOPY (EGD), BIOPSY SINGLE OR MULTIPLE;  Surgeon: Remy Haskins MD;  [facility]     ESOPHAGOSCOPY, GASTROSCOPY, DUODENOSCOPY (EGD), COMBINED N/A 2/25/2015    Procedure: COMBINED ENDOSCOPIC ULTRASOUND, ESOPHAGOSCOPY, GASTROSCOPY, DUODENOSCOPY (EGD), FINE NEEDLE ASPIRATE/BIOPSY;  Surgeon: Clemente Lugo MD;  [facility]     ESOPHAGOSCOPY, GASTROSCOPY, DUODENOSCOPY (EGD), COMBINED Left 2/25/2015    Procedure: COMBINED ESOPHAGOSCOPY, GASTROSCOPY, DUODENOSCOPY (EGD), BIOPSY SINGLE OR MULTIPLE;  Surgeon: Clemente Lugo MD;  Location:  [facility]      ESOPHAGOSCOPY, GASTROSCOPY, DUODENOSCOPY (EGD), COMBINED N/A 9/25/2016    Procedure: COMBINED ESOPHAGOSCOPY, GASTROSCOPY, DUODENOSCOPY (EGD);  Surgeon: Aziza Patiño MD;  [facility]     ESOPHAGOSCOPY, GASTROSCOPY, DUODENOSCOPY (EGD), COMBINED N/A 1/18/2017    Procedure: COMBINED ESOPHAGOSCOPY, GASTROSCOPY, DUODENOSCOPY (EGD), BIOPSY SINGLE OR MULTIPLE;  Surgeon: Clemente Lopez MD;  [facility]     ESOPHAGOSCOPY, GASTROSCOPY, DUODENOSCOPY (EGD), COMBINED N/A 11/26/2017    Procedure: COMBINED ESOPHAGOSCOPY, GASTROSCOPY, DUODENOSCOPY (EGD), REMOVE FOREIGN BODY;  Esophagogastroduodenoscopy with foreign body extraction  ;  Surgeon: Herberth Castrejon MD;  [facility]     ESOPHAGOSCOPY, GASTROSCOPY, DUODENOSCOPY (EGD), COMBINED N/A 11/26/2017    Procedure: COMBINED ESOPHAGOSCOPY, GASTROSCOPY, DUODENOSCOPY (EGD), REMOVE FOREIGN BODY;;  Surgeon: Herberth Castrejon MD;  [facility]     FASCIOTOMY LOWER EXTREMITY Left 6/10/2016    Procedure: FASCIOTOMY LOWER EXTREMITY;  Surgeon: Mello Rodriguez MD;  [facility]     HC CAPSULE ENDOSCOPY N/A 8/25/2014    Procedure: CAPSULE/PILL CAM ENDOSCOPY;  Surgeon: Remy Haskins MD;  [facility]     HC CAPSULE ENDOSCOPY N/A 10/2/2014    Procedure: CAPSULE/PILL CAM ENDOSCOPY;  Surgeon: Remy Haskins MD;  [facility]     ORTHOPEDIC SURGERY      broken wrist repair     SEX TRANSFORMATION SURGERY, MALE TO FEMALE      1974     SINUS SURGERY      cyst removed     TONSILLECTOMY       VASCULAR SURGERY      Left carotid stent       Social History     Tobacco Use     Smoking status: Current Every Day Smoker     Packs/day: 0.25     Years: 30.00     Pack years: 7.50     Types: Cigarettes, Cigars     Smokeless tobacco: Never Used   Substance Use Topics     Alcohol use: No     Alcohol/week: 0.0 oz     Family History   Problem Relation Age of Onset     Dementia Mother      Glaucoma Mother      Diabetes Mother         may have been type 1,  childhood     Coronary Artery Disease Mother         MI     Glaucoma Father      Diabetes Father         may hev been type 1 - ??     Heart Failure Father      Cancer Maternal Aunt         leukemia     Schizophrenia Brother      Depression Brother      Suicide Sister      Depression Sister      Diabetes Sister      Cancer Maternal Aunt         ovarian     Glaucoma Maternal Grandmother      Diabetes Maternal Grandmother      Glaucoma Maternal Grandfather      Diabetes Maternal Grandfather      Glaucoma Paternal Grandmother      Diabetes Paternal Grandmother      Glaucoma Paternal Grandfather      Diabetes Paternal Grandfather      Breast Cancer Sister      Cerebrovascular Disease Brother      Colon Cancer No family hx of      Macular Degeneration No family hx of          Current Outpatient Medications   Medication Sig Dispense Refill     ACETAMINOPHEN PO Take 1,000 mg by mouth every 6 hours as needed for pain Not to exceed 4000 mg/day       ADVAIR DISKUS 250-50 MCG/DOSE diskus inhaler Inhale 1 puff into the lungs 2 times daily 60 Inhaler 11     albuterol (PROVENTIL) (2.5 MG/3ML) 0.083% neb solution INHALE 1 VIAL VIA NEBULIZER EVERY 6 HOURS AS NEEDED 360 mL 11     alendronate (FOSAMAX) 70 MG tablet Take 1 tablet (70 mg) by mouth with 8oz water every 7 days 30 minutes before breakfast and remain upright during this time. 12 tablet 3     aspirin EC 81 MG EC tablet Take 1 tablet (81 mg) by mouth daily 90 tablet 3     atorvastatin (LIPITOR) 40 MG tablet TAKE 1 TAB BY MOUTH ONCE DAILY 30 tablet 11     blood glucose monitoring (ONE TOUCH ULTRA 2) meter device kit Use to test blood sugars 3 times daily or as directed. 1 kit 0     blood glucose monitoring (ONE TOUCH ULTRA) test strip Use to test blood sugars 3 times daily or as directed. 3 Box 3     blood glucose monitoring (ONE TOUCH ULTRASOFT) lancets Use to test blood sugar 3 times daily or as directed. 100 each PRN     brimonidine (ALPHAGAN) 0.2 % ophthalmic solution  Place 1 drop into the right eye 2 times daily 1 Bottle 11     cetirizine (ZYRTEC) 10 MG tablet Take 1 tablet (10 mg) by mouth every evening 30 tablet 3     Cholecalciferol (VITAMIN D) 2000 UNITS tablet Take 2,000 Units by mouth daily. 100 tablet 3     clotrimazole (LOTRIMIN) 1 % cream INSERT 1 APPLICATORFUL VAGINALLY TWICE A DAY FOR VAGINAL PAIN 12 g 0     diclofenac (VOLTAREN) 1 % GEL topical gel Apply 2 g topically 4 times daily to hands 100 g 11     dorzolamide (TRUSOPT) 2 % ophthalmic solution Place 1 drop into the right eye 2 times daily 1 Bottle 11     EPINEPHrine (EPIPEN/ADRENACLICK/OR ANY BX GENERIC EQUIV) 0.3 MG/0.3ML injection 2-pack Inject 0.3 mLs (0.3 mg) into the muscle as needed for anaphylaxis 0.6 mL 1     escitalopram (LEXAPRO) 10 MG tablet TAKE 1 TAB BY MOUTH ONCE DAILY 30 tablet 11     ferrous sulfate (IRON) 325 (65 FE) MG tablet Take 1 tablet (325 mg) by mouth 2 times daily With meals 60 tablet 2     fluticasone (FLONASE) 50 MCG/ACT nasal spray Spray 1 spray into both nostrils daily       lactulose (CHRONULAC) 10 GM/15ML solution Take 20 g by mouth as needed for constipation       latanoprost (XALATAN) 0.005 % ophthalmic solution Place 1 drop into both eyes At Bedtime 2.5 mL 11     levETIRAcetam (KEPPRA) 500 MG tablet TAKE 1 TAB BY MOUTH TWICE A DAY 60 tablet 5     lisinopril (PRINIVIL/ZESTRIL) 20 MG tablet TAKE 1 TAB BY MOUTH ONCE DAILY 30 tablet 11     metFORMIN (GLUCOPHAGE) 500 MG tablet TAKE 1 TAB BY MOUTH IN THE EVENING WITH DINNER 30 tablet 5     metoprolol succinate ER (TOPROL-XL) 50 MG 24 hr tablet TAKE 1 TAB BY MOUTH ONCE DAILY 30 tablet 11     nicotine (NICODERM CQ) 14 MG/24HR 24 hr patch Place 1 patch onto the skin every 24 hours 30 patch 3     nitroglycerin (NITROSTAT) 0.4 MG SL tablet Place 1 tablet (0.4 mg) under the tongue every 5 minutes as needed for chest pain if you are still having symptoms after 3 doses (15 minutes) call 911. 25 tablet 1     ondansetron (ZOFRAN-ODT) 8 MG ODT  tab Take 1 tablet (8 mg) by mouth every 8 hours as needed for nausea 30 tablet 0     order for DME Equipment being ordered: Prosthetic legs 2 each 0     order for DME Equipment being ordered: Prosthetic 1 each 0     order for DME Equipment being ordered: lift recliner 1 Device 0     order for DME Equipment being ordered: Hospital Bed with side rails 1 each 0     order for DME Equipment being ordered: Power Wheel Chair 1 Device 0     order for DME Equipment being ordered: bandage tape, prefer paper 1 Package 0     order for DME Full electric hospital bed with half rails    Dx: D09092, I110, J449  Length of need: lifetime 1 Device 0     order for DME Wheel Chair Cushion: 18 x 18 inch Roho cushion 1 Device 0     order for DME Hospital bed with side rails 1 Device 0     order for DME Equipment being ordered: CPAP supplies mask, hose, filters, cushion    fax to [facility] at [phone] 10 Device prn     order for DME Equipment being ordered: CPAP supplies mask, hose, filters, cushion fax to [facility] at [phone]  Disp: 10 Device Refills: prn   Class: Local Print Start: 2/10/2017 1 Device 0     order for DME Equipment being ordered: Nebulizer and tubing supplies 1 Units 0     order for DME Equipment being ordered: Glucerna daily shakes with each meal 1 Box 11     ORDER FOR DME Equipment being ordered: Power Wheelchair 1 Device 0     ORDER FOR DME Equipment being ordered: Depends briefs 1 Month 11     oxyCODONE (ROXICODONE) 5 MG tablet TAKE 1 TABLET BY MOUTH EVERY 6 HOURS AS NEEDED  0     pantoprazole (PROTONIX) 40 MG EC tablet TAKE 1 TAB BY MOUTH ONCE DAILY 30 tablet 11     phenytoin (DILANTIN) 100 MG capsule TAKE 2 CAPS (200MG) BY MOUTH TWICE A  capsule 11     polyethylene glycol (MIRALAX/GLYCOLAX) Packet Take 17 g by mouth daily Dissolved in water or juice        pregabalin (LYRICA) 75 MG capsule Take 150 mg by mouth 3 times daily       risperiDONE (RISPERDAL) 0.5 MG tablet TAKE 1 TAB BY MOUTH AT  BEDTIME 30 tablet 5     sennosides (SENOKOT) 8.6 MG tablet Take 1 tablet by mouth 2 times daily        solifenacin (VESICARE) 10 MG tablet TAKE 1 TAB BY MOUTH ONCE DAILY 30 tablet 11     sucralfate (CARAFATE) 1 GM tablet TAKE 1 TAB BY MOUTH FOUR TIMES DAILY. MAY DISSOLVE IN 10ML WATER ISNECESSARY 120 tablet 11     traZODone (DESYREL) 150 MG tablet TAKE 1 TAB BY MOUTH AT BEDTIME 30 tablet 11     umeclidinium (INCRUSE ELLIPTA) 62.5 MCG/INH inhaler Inhale 1 puff into the lungs daily 1 Inhaler 6     VENTOLIN  (90 Base) MCG/ACT inhaler Inhale 2 puffs into the lungs every 6 hours as needed for shortness of breath / dyspnea or wheezing 8.5 g 11     Allergies   Allergen Reactions     Bee Venom      Penicillins Anaphylaxis     Other reaction(s): Skin Rash and/or Hives     Dilantin [Phenytoin] Other (See Comments)     Generic dilantin only per pt     Iodide Hives     09/11/15: Pt states she can use iodine and doesn't have any problems      Iodine-131      Novocaine [Procaine] Hives     Other reaction(s): Skin Rash and/or Hives     Tositumomab      BP Readings from Last 3 Encounters:   08/14/19 98/66   08/05/19 108/68   07/09/19 126/70    Wt Readings from Last 3 Encounters:   08/14/19 62.1 kg (137 lb)   08/05/19 63.5 kg (140 lb)   06/19/19 59 kg (130 lb)                 Reviewed and updated as needed this visit by Provider         Review of Systems   ROS COMP: CONSTITUTIONAL: NEGATIVE for fever, chills, change in weight  ENT/MOUTH: NEGATIVE for ear, mouth and throat problems  RESP: NEGATIVE for significant cough or SOB  CV: NEGATIVE for chest pain, palpitations or peripheral edema  GI: NEGATIVE for nausea, abdominal pain, heartburn, or change in bowel habits  : NEGATIVE for frequency, dysuria, or hematuria  MUSCULOSKELETAL: NEGATIVE for significant arthralgias or myalgia that is new but she does have issues with chronic pain  NEURO: NEGATIVE for weakness, dizziness or paresthesias  HEME: NEGATIVE for bleeding  problems  PSYCHIATRIC: NEGATIVE for changes in mood or affect      Objective    /78   Pulse 80   Temp 97.8  F (36.6  C) (Temporal)   Resp 16   Wt 62.1 kg (137 lb)   SpO2 98%   BMI 24.27 kg/m    Body mass index is 24.27 kg/m .  Physical Exam   GENERAL: alert and no distress  EYES: Eyes grossly normal to inspection, PERRL and conjunctivae and sclerae normal  HENT: ear canals and TM's normal, nose and mouth without ulcers or lesions  NECK: no adenopathy, no asymmetry, masses, or scars and thyroid normal to palpation  RESP: lungs clear to auscultation - no rales, rhonchi or wheezes  CV: regular rate and rhythm, normal S1 S2, no S3 or S4, no  click or rub, no peripheral edema and peripheral pulses strong  ABDOMEN: soft, nontender, no hepatosplenomegaly, no masses and bowel sounds normal  Tenderness noted along left lower lateral rib margin it is reproducible.  There is no skin change or no obvious chest wall deformity.  There is no distinct rash.  This is also exacerbated by any movement of her upper thorax consistent with potential musculoskeletal discomfort.  MS: bilateral LE amputations  NEURO: No focal changes from baseline  PSYCH: mentation appears normal, affect normal/bright  Wheelchair bound        Assessment & Plan     1. Chest wall pain  The symptoms appear to be primarily chest wall in origin located along the rib margin.  I suggested a chest x-ray for reassurance and she is already on chronic narcotics.  I question whether this may improve when she gets her powered wheelchair as she will not have to use her upper extremities anymore.  - XR Chest 2 Views; Future    2. Other migraine without status migrainosus, not intractable  Refilled for as needed use.  - ondansetron (ZOFRAN-ODT) 8 MG ODT tab; Take 1 tablet (8 mg) by mouth every 8 hours as needed for nausea  Dispense: 30 tablet; Refill: 0    3. Nausea  Discussed ongoing use and if the fact that if her requirement is frequent she may need to see  GI for further evaluation  - ondansetron (ZOFRAN-ODT) 8 MG ODT tab; Take 1 tablet (8 mg) by mouth every 8 hours as needed for nausea  Dispense: 30 tablet; Refill: 0     Tobacco Cessation:   reports that she has been smoking cigarettes and cigars.  She has a 7.50 pack-year smoking history. She has never used smokeless tobacco.  Tobacco Cessation Action Plan: Information offered: Patient not interested at this time    See Patient Instructions    Return in about 2 weeks (around 9/10/2019) for if symptoms recur or worsen.    Allan Casey MD  [facility]

## 2019-09-04 ENCOUNTER — PRE VISIT (OUTPATIENT)
Dept: UROLOGY | Facility: CLINIC | Age: 69
End: 2019-09-04

## 2019-09-04 NOTE — TELEPHONE ENCOUNTER
Reason for Visit: Bladder botox consult, per pt    Diagnosis: Urinary incontinence    Orders/Procedures/Records: Records available, previously seen by Dr. Oliver and Dr. Dawson    Contact Patient: N/A    Rooming Requirements: Normal      Tereza Hess  09/04/19  9:07 AM

## 2019-09-10 ENCOUNTER — MEDICAL CORRESPONDENCE (OUTPATIENT)
Dept: HEALTH INFORMATION MANAGEMENT | Facility: CLINIC | Age: 69
End: 2019-09-10

## 2019-09-11 ENCOUNTER — DOCUMENTATION ONLY (OUTPATIENT)
Dept: SLEEP MEDICINE | Facility: CLINIC | Age: 69
End: 2019-09-11

## 2019-09-11 DIAGNOSIS — G47.39 COMPLEX SLEEP APNEA SYNDROME: ICD-10-CM

## 2019-09-11 DIAGNOSIS — G47.33 OSA (OBSTRUCTIVE SLEEP APNEA): ICD-10-CM

## 2019-09-11 NOTE — PROGRESS NOTES
Late entry into [facility].  Pt was a replacement machine so pt did not get enrolled at time of setup

## 2019-09-12 ENCOUNTER — MEDICAL CORRESPONDENCE (OUTPATIENT)
Dept: HEALTH INFORMATION MANAGEMENT | Facility: CLINIC | Age: 69
End: 2019-09-12

## 2019-09-16 ENCOUNTER — DOCUMENTATION ONLY (OUTPATIENT)
Dept: SLEEP MEDICINE | Facility: CLINIC | Age: 69
End: 2019-09-16
Payer: COMMERCIAL

## 2019-09-16 DIAGNOSIS — G47.39 COMPLEX SLEEP APNEA SYNDROME: ICD-10-CM

## 2019-09-16 DIAGNOSIS — G47.33 OSA (OBSTRUCTIVE SLEEP APNEA): ICD-10-CM

## 2019-09-16 NOTE — PROGRESS NOTES
3 DAY STM VISIT    Diagnostic AHI: 14 PSG    Patient contacted for 3 day STM visit  Subjective measures:  Patient has bronchitis so it's been difficult to use her mask and machine.       Device type: CPAP  PAP settings from order::  CPAP fixed 12 cm  H20  Mask type:    Full Face Mask     Device settings from machine        CPAP fixed 12 cm  H20  Assessment: Nightly usage, most nights under four hours. Patient will call if she has questions or concerns.   Action plan: Pt to have f/u 14 day STM visit.  Patient has a follow up visit scheduled:   yes within 31-90 days of set up.    Total time spent on remote patient monitoring data analysis and patient contact today:   14 minutes

## 2019-09-17 ENCOUNTER — HOSPITAL ENCOUNTER (EMERGENCY)
Facility: CLINIC | Age: 69
Discharge: HOME | End: 2019-09-17
Attending: EMERGENCY MEDICINE | Admitting: EMERGENCY MEDICINE
Payer: COMMERCIAL

## 2019-09-17 ENCOUNTER — TELEPHONE (OUTPATIENT)
Dept: INTERNAL MEDICINE | Facility: CLINIC | Age: 69
End: 2019-09-17

## 2019-09-17 VITALS
BODY MASS INDEX: 52.09 KG/M2 | SYSTOLIC BLOOD PRESSURE: 133 MMHG | OXYGEN SATURATION: 96 % | RESPIRATION RATE: 12 BRPM | DIASTOLIC BLOOD PRESSURE: 75 MMHG | TEMPERATURE: 98.8 F | HEART RATE: 82 BPM | HEIGHT: 55 IN

## 2019-09-17 DIAGNOSIS — N39.0 URINARY TRACT INFECTION: ICD-10-CM

## 2019-09-17 DIAGNOSIS — N30.90 BLADDER INFECTION: ICD-10-CM

## 2019-09-17 LAB
ALBUMIN SERPL-MCNC: 2.9 G/DL (ref 3.4–5)
ALBUMIN UR-MCNC: 10 MG/DL
ALP SERPL-CCNC: 205 U/L (ref 40–150)
ALT SERPL W P-5'-P-CCNC: 68 U/L (ref 0–50)
ANION GAP SERPL CALCULATED.3IONS-SCNC: 8 MMOL/L (ref 3–14)
APPEARANCE UR: CLEAR
AST SERPL W P-5'-P-CCNC: 50 U/L (ref 0–45)
BACTERIA #/AREA URNS HPF: ABNORMAL /HPF
BASOPHILS # BLD AUTO: 0.1 10E9/L (ref 0–0.2)
BASOPHILS NFR BLD AUTO: 0.5 %
BILIRUB SERPL-MCNC: <0.1 MG/DL (ref 0.2–1.3)
BILIRUB UR QL STRIP: NEGATIVE
BUN SERPL-MCNC: 9 MG/DL (ref 7–30)
CALCIUM SERPL-MCNC: 8.7 MG/DL (ref 8.5–10.1)
CHLORIDE SERPL-SCNC: 107 MMOL/L (ref 94–109)
CO2 BLDCOV-SCNC: 28 MMOL/L (ref 21–28)
CO2 SERPL-SCNC: 26 MMOL/L (ref 20–32)
COLOR UR AUTO: ABNORMAL
CREAT SERPL-MCNC: 0.48 MG/DL (ref 0.52–1.04)
DIFFERENTIAL METHOD BLD: ABNORMAL
EOSINOPHIL # BLD AUTO: 0.4 10E9/L (ref 0–0.7)
EOSINOPHIL NFR BLD AUTO: 3.1 %
ERYTHROCYTE [DISTWIDTH] IN BLOOD BY AUTOMATED COUNT: 14.1 % (ref 10–15)
GFR SERPL CREATININE-BSD FRML MDRD: >90 ML/MIN/{1.73_M2}
GLUCOSE SERPL-MCNC: 78 MG/DL (ref 70–99)
GLUCOSE UR STRIP-MCNC: NEGATIVE MG/DL
HCT VFR BLD AUTO: 34.3 % (ref 35–47)
HGB BLD-MCNC: 11.1 G/DL (ref 11.7–15.7)
HGB UR QL STRIP: ABNORMAL
IMM GRANULOCYTES # BLD: 0.1 10E9/L (ref 0–0.4)
IMM GRANULOCYTES NFR BLD: 0.4 %
KETONES UR STRIP-MCNC: NEGATIVE MG/DL
LACTATE BLD-SCNC: 1 MMOL/L (ref 0.7–2.1)
LEUKOCYTE ESTERASE UR QL STRIP: ABNORMAL
LYMPHOCYTES # BLD AUTO: 1.5 10E9/L (ref 0.8–5.3)
LYMPHOCYTES NFR BLD AUTO: 12.4 %
MCH RBC QN AUTO: 28.8 PG (ref 26.5–33)
MCHC RBC AUTO-ENTMCNC: 32.4 G/DL (ref 31.5–36.5)
MCV RBC AUTO: 89 FL (ref 78–100)
MONOCYTES # BLD AUTO: 1.4 10E9/L (ref 0–1.3)
MONOCYTES NFR BLD AUTO: 11.7 %
MUCOUS THREADS #/AREA URNS LPF: PRESENT /LPF
NEUTROPHILS # BLD AUTO: 8.7 10E9/L (ref 1.6–8.3)
NEUTROPHILS NFR BLD AUTO: 71.9 %
NITRATE UR QL: POSITIVE
NRBC # BLD AUTO: 0 10*3/UL
NRBC BLD AUTO-RTO: 0 /100
PCO2 BLDV: 46 MM HG (ref 40–50)
PH BLDV: 7.39 PH (ref 7.32–7.43)
PH UR STRIP: 8 PH (ref 5–7)
PLATELET # BLD AUTO: 302 10E9/L (ref 150–450)
PO2 BLDV: 60 MM HG (ref 25–47)
POTASSIUM SERPL-SCNC: 3.8 MMOL/L (ref 3.4–5.3)
PROT SERPL-MCNC: 7.6 G/DL (ref 6.8–8.8)
RBC # BLD AUTO: 3.85 10E12/L (ref 3.8–5.2)
RBC #/AREA URNS AUTO: 13 /HPF (ref 0–2)
SAO2 % BLDV FROM PO2: 90 %
SODIUM SERPL-SCNC: 141 MMOL/L (ref 133–144)
SOURCE: ABNORMAL
SP GR UR STRIP: 1.01 (ref 1–1.03)
UROBILINOGEN UR STRIP-MCNC: NORMAL MG/DL (ref 0–2)
WBC # BLD AUTO: 12.1 10E9/L (ref 4–11)
WBC #/AREA URNS AUTO: 8 /HPF (ref 0–5)

## 2019-09-17 PROCEDURE — 83605 ASSAY OF LACTIC ACID: CPT

## 2019-09-17 PROCEDURE — 87086 URINE CULTURE/COLONY COUNT: CPT | Performed by: EMERGENCY MEDICINE

## 2019-09-17 PROCEDURE — 96365 THER/PROPH/DIAG IV INF INIT: CPT | Mod: 59

## 2019-09-17 PROCEDURE — 81001 URINALYSIS AUTO W/SCOPE: CPT | Performed by: EMERGENCY MEDICINE

## 2019-09-17 PROCEDURE — 94640 AIRWAY INHALATION TREATMENT: CPT

## 2019-09-17 PROCEDURE — 80053 COMPREHEN METABOLIC PANEL: CPT | Performed by: STUDENT IN AN ORGANIZED HEALTH CARE EDUCATION/TRAINING PROGRAM

## 2019-09-17 PROCEDURE — 99284 EMERGENCY DEPT VISIT MOD MDM: CPT | Mod: 25

## 2019-09-17 PROCEDURE — 82803 BLOOD GASES ANY COMBINATION: CPT

## 2019-09-17 PROCEDURE — 99284 EMERGENCY DEPT VISIT MOD MDM: CPT | Mod: GC | Performed by: EMERGENCY MEDICINE

## 2019-09-17 PROCEDURE — 25000128 H RX IP 250 OP 636: Performed by: STUDENT IN AN ORGANIZED HEALTH CARE EDUCATION/TRAINING PROGRAM

## 2019-09-17 PROCEDURE — 25000125 ZZHC RX 250: Performed by: STUDENT IN AN ORGANIZED HEALTH CARE EDUCATION/TRAINING PROGRAM

## 2019-09-17 PROCEDURE — 85025 COMPLETE CBC W/AUTO DIFF WBC: CPT | Performed by: STUDENT IN AN ORGANIZED HEALTH CARE EDUCATION/TRAINING PROGRAM

## 2019-09-17 PROCEDURE — 51700 IRRIGATION OF BLADDER: CPT

## 2019-09-17 PROCEDURE — 51798 US URINE CAPACITY MEASURE: CPT

## 2019-09-17 RX ORDER — PHENAZOPYRIDINE HYDROCHLORIDE 100 MG/1
100 TABLET, FILM COATED ORAL 3 TIMES DAILY
Qty: 9 TABLET | Refills: 0 | Status: SHIPPED | OUTPATIENT
Start: 2019-09-17 | End: 2019-10-23

## 2019-09-17 RX ORDER — CIPROFLOXACIN 500 MG/1
500 TABLET, FILM COATED ORAL 2 TIMES DAILY
Qty: 14 TABLET | Refills: 0 | Status: SHIPPED | OUTPATIENT
Start: 2019-09-17 | End: 2019-10-23

## 2019-09-17 RX ORDER — ALBUTEROL SULFATE 0.83 MG/ML
2.5 SOLUTION RESPIRATORY (INHALATION) ONCE
Status: COMPLETED | OUTPATIENT
Start: 2019-09-17 | End: 2019-09-17

## 2019-09-17 RX ORDER — MAGNESIUM HYDROXIDE 1200 MG/15ML
LIQUID ORAL
Status: DISCONTINUED
Start: 2019-09-17 | End: 2019-09-17 | Stop reason: HOSPADM

## 2019-09-17 RX ORDER — CEFTRIAXONE 1 G/1
1 INJECTION, POWDER, FOR SOLUTION INTRAMUSCULAR; INTRAVENOUS ONCE
Status: COMPLETED | OUTPATIENT
Start: 2019-09-17 | End: 2019-09-17

## 2019-09-17 RX ORDER — SULFAMETHOXAZOLE/TRIMETHOPRIM 800-160 MG
1 TABLET ORAL 2 TIMES DAILY
Qty: 20 TABLET | Refills: 0 | Status: SHIPPED | OUTPATIENT
Start: 2019-09-17 | End: 2019-11-19

## 2019-09-17 RX ADMIN — CEFTRIAXONE 1 G: 1 INJECTION, POWDER, FOR SOLUTION INTRAMUSCULAR; INTRAVENOUS at 15:35

## 2019-09-17 RX ADMIN — ALBUTEROL SULFATE 2.5 MG: 2.5 SOLUTION RESPIRATORY (INHALATION) at 16:37

## 2019-09-17 ASSESSMENT — ENCOUNTER SYMPTOMS
SINUS PRESSURE: 0
BACK PAIN: 1
PHOTOPHOBIA: 0
NECK PAIN: 0
EYE PAIN: 0
VOMITING: 0
NAUSEA: 0
DYSURIA: 1
CHEST TIGHTNESS: 0
HEADACHES: 0
SINUS PAIN: 0
CONFUSION: 0
FATIGUE: 0
FREQUENCY: 1
DIZZINESS: 0
FEVER: 0
BLOOD IN STOOL: 0
ABDOMINAL PAIN: 1
DIARRHEA: 0
NECK STIFFNESS: 0
AGITATION: 0
COUGH: 0
DIAPHORESIS: 0
ABDOMINAL DISTENTION: 0
CONSTIPATION: 0
CHOKING: 0
CHILLS: 1
HEMATURIA: 1
LIGHT-HEADEDNESS: 0
COLOR CHANGE: 0
ANAL BLEEDING: 0
FLANK PAIN: 0
PALPITATIONS: 0

## 2019-09-17 NOTE — TELEPHONE ENCOUNTER
[facility] calling [phone]    Pt complaining of severe UTI sx.  Refused to let staff at facility obtain urine sample to facility medical staff could treat.  Pt called 911 on her own and was taken away by ambulance.  Unsure where they were taking her or when she would be back.  Just wanted to let you know.

## 2019-09-17 NOTE — DISCHARGE INSTRUCTIONS
Please make an appointment to follow up with Your Primary Care Provider in 2 days. In the meantime, make sure you have your temperature twice a day for the next week. If you develop any fevers, or any new symptoms whatsoever, then return to the ER immediately.

## 2019-09-17 NOTE — ED PROVIDER NOTES
History     Chief Complaint   Patient presents with     Rule out Urinary Tract Infection     HPI  Sonya Foote is a 69 year old female with PMHx of PVD, bilateral AKA, chronic pain, chronic systolic CHF, HTN, DM2, CVA, seizure disorder, schizoaffective disorder who presents with complaints of possible UTI. Pt states that she has been having increasing urination since yesterday. She normally urinates about 3-4x/day, but lately she has been urinating about 6-8x/day. Likewise, pt has a suprapubic catheter and most of her urine typically passes through her catheter at baseline, but lately she has been urinating on her own. Pt endorses urgency, dysuria, and frequency. States she does not currently have hematuria, but she did notice blood in her urine last week. It lasted for about one day and then cleared up on its own. She had some mild pain with the hematuria. Pt states she has not been sexually active for several years. Also complains of bladder spasms. She has spasms at baseline (4x/day), but since yesterday it has increased to 7-8x/day. It is a 10/10, sharp, lasts about 1-2 hours and goes away for about 3 minutes and then returns. She has an appointment with her urologist on 9/23/19 and they are considering botox injections for her bladder spasms. Pt also endorses abdominal pain, chills, thinks she may have had a fever last night, but is unsure. At time of interview, 600ccs in pt's catheter bag.     I have reviewed the Medications, Allergies, Past Medical and Surgical History, and Social History in the Epic system.    Review of Systems   Constitutional: Positive for chills. Negative for diaphoresis, fatigue and fever.   HENT: Negative for ear pain, hearing loss, sinus pressure and sinus pain.    Eyes: Negative for photophobia, pain and visual disturbance.   Respiratory: Negative for cough, choking and chest tightness.    Cardiovascular: Negative for chest pain, palpitations and leg swelling.   Gastrointestinal:  "Positive for abdominal pain. Negative for abdominal distention, anal bleeding, blood in stool, constipation, diarrhea, nausea and vomiting.   Endocrine: Negative for polyuria.   Genitourinary: Positive for dysuria, frequency, hematuria and urgency. Negative for flank pain.   Musculoskeletal: Positive for back pain. Negative for neck pain and neck stiffness.   Skin: Negative for color change and rash.   Neurological: Negative for dizziness, light-headedness and headaches.   Psychiatric/Behavioral: Negative for agitation and confusion.       Physical Exam   BP: 129/75  Pulse: 82  Temp: 98.8  F (37.1  C)  Resp: 16  Height: 109.2 cm (3' 7\")  SpO2: 99 %      Physical Exam  General: well-appearing, in no acute distress   HEENT: thrush present in oral cavity, NC/AT, EOMI, PERRL  CVS: normal s1,s2; no murmurs, rubs, or gallops  Resp: CTAB, no wheezes or crackles   Abd: suprapubic catheter present with purulence on the bandage, normoactive bowel sounds, diffusely tender to palpation, nondistended, no rebound tenderness, no rigidity or guarding   Musc: bilateral above the knee amputations secondary to PVD   Back: pain pump present in left lower back, CVA tenderness present bilaterally    Skin: no skin rashes   Neuro: alert and oriented x4    ED Course   Pt evaluated in ED06. CBC, CMP, lactate, UA with reflex UC ordered.     Procedures    Labs Ordered and Resulted from Time of ED Arrival Up to the Time of Departure from the ED   UA MACROSCOPIC WITH REFLEX TO MICRO AND CULTURE - Abnormal; Notable for the following components:       Result Value    Blood Urine Trace (*)     pH Urine 8.0 (*)     Protein Albumin Urine 10 (*)     Nitrite Urine Positive (*)     Leukocyte Esterase Urine Moderate (*)     RBC Urine 13 (*)     WBC Urine 8 (*)     Bacteria Urine Few (*)     Mucous Urine Present (*)     All other components within normal limits   URINE CULTURE AEROBIC BACTERIAL       Assessments & Plan (with Medical Decision Making) "   Sonya Foote is a 69 year old female with PMHx of PVD, bilateral AKA, chronic pain, chronic systolic CHF, HTN, DM2, CVA, seizure disorder, schizoaffective disorder who presents with complaints of possible UTI. Pt's physical exam in pertinent for a suprapubic catheter, diffuse abdominal pain, bilateral CVA tenderness. DDx in this pt includes UTI, pyelonephritis, nephrolithiasis and other causes of abdominal pain (pancreatitis, hepatitis, choledocholithiasis - although these are less likely in this patient). Most likely differential in this patient is UTI as UA reveals trace blood (13 RBCs), positive nitrites, moderate leukocyte esterase, 8 WBCs, few bacteria, with mucous present.     There is also concern for cath failure as pt reports decreased urine output via catheter. Urology consulted - recommend catheter flush and bladder scan. Catheter flush performed, no blockage revealed. Bladder scan shows 54cc fluid in bladder.     Pt started on 1mg Ceftriaxone IV pending urine cultures. Consider changing antibiotics based on UC, however past UCs have shown sensitivity to CTX.     Pt also received albuterol nebulizer which she takes at home as she was having a little bit of shortness of breath and requested it.     I have reviewed the nursing notes.    I have reviewed the findings, diagnosis, plan and need for follow up with the patient.    New Prescriptions    No medications on file       Final diagnoses:   Bladder infection   Urinary tract infection     Aubrey Osorio MD   Internal Medicine, PGY-1   9/17/2019   [facility], [place], EMERGENCY DEPARTMENT     Aubrey Osorio MD  Resident  09/20/19 7681      This data collected with the Resident working in the Emergency Department.  Patient was seen and evaluated by myself and I repeated the history and physical exam with the patient.  The plan of care was discussed with them.  The key portions of the note including the entire assessment and plan reflect my  documentation.         Marcel Santana MD  09/29/19 2327

## 2019-09-17 NOTE — ED AVS SNAPSHOT
[facility], [place], Emergency Department  [address] [place] [id number]  Phone:  [phone]                                    Sonya Foote   MRN: [id number]    Department:  [facility], Emergency Department   Date of Visit:  9/17/2019           After Visit Summary Signature Page    I have received my discharge instructions, and my questions have been answered. I have discussed any challenges I see with this plan with the nurse or doctor.    ..........................................................................................................................................  Patient/Patient Representative Signature      ..........................................................................................................................................  Patient Representative Print Name and Relationship to Patient    ..................................................               ................................................  Date                                   Time    ..........................................................................................................................................  Reviewed by Signature/Title    ...................................................              ..............................................  Date                                               Time          22EPIC Rev 08/18

## 2019-09-17 NOTE — ED TRIAGE NOTES
Pt BIBA from [facility] with c/o lower abdominal pain and burning with urination. Pt has suprapubic catheter in place.

## 2019-09-18 LAB
BACTERIA SPEC CULT: NORMAL
Lab: NORMAL
SPECIMEN SOURCE: NORMAL

## 2019-09-19 ENCOUNTER — OFFICE VISIT (OUTPATIENT)
Dept: INTERNAL MEDICINE | Facility: CLINIC | Age: 69
End: 2019-09-19
Payer: COMMERCIAL

## 2019-09-19 VITALS
RESPIRATION RATE: 22 BRPM | DIASTOLIC BLOOD PRESSURE: 66 MMHG | OXYGEN SATURATION: 98 % | HEART RATE: 72 BPM | SYSTOLIC BLOOD PRESSURE: 98 MMHG

## 2019-09-19 DIAGNOSIS — N30.01 ACUTE CYSTITIS WITH HEMATURIA: Primary | ICD-10-CM

## 2019-09-19 DIAGNOSIS — B37.0 CANDIDA, ORAL: ICD-10-CM

## 2019-09-19 DIAGNOSIS — B37.0 THRUSH: ICD-10-CM

## 2019-09-19 PROCEDURE — 99213 OFFICE O/P EST LOW 20 MIN: CPT | Performed by: PHYSICIAN ASSISTANT

## 2019-09-19 RX ORDER — CLOTRIMAZOLE 1 %
CREAM (GRAM) TOPICAL 2 TIMES DAILY
Qty: 12 G | Refills: 0 | Status: ON HOLD | OUTPATIENT
Start: 2019-09-19 | End: 2020-06-11

## 2019-09-19 RX ORDER — NYSTATIN 100000/ML
500000 SUSPENSION, ORAL (FINAL DOSE FORM) ORAL 4 TIMES DAILY
Qty: 140 ML | Refills: 0 | Status: SHIPPED | OUTPATIENT
Start: 2019-09-19 | End: 2019-10-23

## 2019-09-19 NOTE — PATIENT INSTRUCTIONS
Stop Antibiotics prescribed by ED as culture returned normal, continue with urology follow up    If symptoms worsen, urgent follow up

## 2019-09-19 NOTE — PROGRESS NOTES
Subjective     Sonya Foote is a 69 year old female who presents to clinic today for the following health issues:    HPI   ED/UC Followup:    Facility:  [facility]  Date of visit: 09/17/2019  Reason for visit: UTI  Current Status: Improving  ED SUMMARY:  Sonya Foote is a 69 year old female with PMHx of PVD, bilateral AKA, chronic pain, chronic systolic CHF, HTN, DM2, CVA, seizure disorder, schizoaffective disorder who presents with complaints of possible UTI. Pt's physical exam in pertinent for a suprapubic catheter, diffuse abdominal pain, bilateral CVA tenderness. DDx in this pt includes UTI, pyelonephritis, nephrolithiasis and other causes of abdominal pain (pancreatitis, hepatitis, choledocholithiasis - although these are less likely in this patient). Most likely differential in this patient is UTI as UA reveals trace blood (13 RBCs), positive nitrites, moderate leukocyte esterase, 8 WBCs, few bacteria, with mucous present.      There is also concern for cath failure as pt reports decreased urine output via catheter. Urology consulted - recommend catheter flush and bladder scan. Catheter flush performed, no blockage revealed. Bladder scan shows 54cc fluid in bladder.      Pt started on 1mg Ceftriaxone IV pending urine cultures. Consider changing antibiotics based on UC, however past UCs have shown sensitivity to CTX.      Pt also received albuterol nebulizer which she takes at home as she was having a little bit of shortness of breath and requested it.       UPDATE SINCE ED:    Patient notes abdominal symptoms improving. She continues to have bladder spasms. She has follow up planned with urology on Monday. Her urine culture returned within normal limits. Patient also notes she has thrush and was advised her primary had to treat this. She notes cracks on lips along with this.          Reviewed and updated as needed this visit by Provider  Meds  Problems             Objective    BP 98/66   Pulse 72   Resp  22   SpO2 98%   There is no height or weight on file to calculate BMI.  Physical Exam   GENERAL: healthy, alert and no distress  HENT: oropharynx with plaques on tongue, corner of lips with skin breakdown and cracking   ABDOMEN: soft, mild generalized tenderness (reportedly improved), no hepatosplenomegaly, no masses and bowel sounds normal    Diagnostic Test Results:  Labs reviewed in Epic        Assessment & Plan     1. Acute cystitis with hematuria  - reviewed culture was within normal limits, discontinue antibiotics  - continued planned follow up with UC    2. Thrush  - treatment as below   - nystatin (MYCOSTATIN) 434067 UNIT/ML suspension; Take 5 mLs (500,000 Units) by mouth 4 times daily for 7 days Swish solution and then spit  Dispense: 140 mL; Refill: 0  - follow up if no improvement     3. Candida, oral  - suspect cracking in lips is due to candida cream given as below  - clotrimazole (LOTRIMIN) 1 % external cream; Apply topically 2 times daily  Dispense: 12 g; Refill: 0  - follow up if no improvement     Return in about 4 days (around 9/23/2019) for urology apt.    Machelle Barber PA-C  [facility]

## 2019-09-22 NOTE — PROGRESS NOTES
"New Consult for overactive bladder    Name: Sonya Foote    MRN: [id number]   YOB: 1950                 Chief Complaint:   Overactive Bladder, UTIs          History of Present Illness:   HISTORY:   Sonya Foote is a 69 year old female who used to see Dr. Oliver who current manages her bladder with a SP tube for functional incontinence (had bilateral AKA's so could not transfer to toilet). She has a complex medical history including PVD, bilateral AKA, chronic pain, chronic systolic CHF, HTN, DM2, CVA, seizure disorder, and schizoaffective disorder. Her SP was placed 1/2018 due to urgency and UUI and functional incontinence due to immobility. Last seen 7/2018 at which point Botox has been discussed but she was moving to [place] so this was not pursued. She did move to [place] but moved back because did not have care.     Recently seen in the ER for abdominal pain concerning for UTI:  Despite SP tube normally urinates 3-4x per day. However was voiding 6-8x per day.   Had abdominal pain and flank pain and bladder spasms. On Cipro.   She has recurrent UTIs - 5-6x in the last year.  Has tried vaginal estrogen cream but did not work.    She has a history of open angle glaucoma and significant peripheral vascular disease so she was deemed a poor candidate for medical management of OAB symptoms.    Cystoscopy 5/2017 by Dr. Oliver was normal.  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\"  At 13 years old her father decided he wanted her to be a girl so had surgery to remove \"boy parts\"  Had neovagina creation         Past Medical History:     Past Medical History:   Diagnosis Date     Anemia      Antiplatelet or antithrombotic long-term use      Arthritis      AS (sickle cell trait) (H) 10/8/2013     Blind left eye      BPPV (benign paroxysmal positional vertigo)      Chronic back " pain      COPD (chronic obstructive pulmonary disease) (H)      Coronary artery disease      CVA (cerebral infarction) 10/8/2012    x3, residual left sided weakness     Diastolic CHF (H) 9/4/2012     Dilantin toxicity 5/31/2013     Esophageal candidiasis (H)      GERD (gastroesophageal reflux disease)      Heart attack (H)      Hernia, abdominal      History of blood transfusion     x5     History of thrombophlebitis      HTN (hypertension) 9/4/2012     Hyperlipidemia LDL goal <100 9/4/2012     Legally blind in right eye, as defined in [place]     No periphal vision right eye     Osteoporosis 1/21/2013     Other chronic pain      PAD (peripheral artery disease) (H)      Palpitations      Person who has had sex change operation 1/20/2014     Pneumonia      Schizoaffective disorder (H)      Seizure disorder (H) 9/4/2012     Sleep apnea     CPAP     Thrombosis of leg      Type 2 diabetes, HbA1C goal < 8% (H) 9/4/2012     Uncomplicated asthma      Unspecified cerebral artery occlusion with cerebral infarction             Past Surgical History:     Past Surgical History:   Procedure Laterality Date     AMPUTATE LEG ABOVE KNEE Left 6/11/2016    Procedure: AMPUTATE LEG ABOVE KNEE;  Surgeon: Mello Rodriguez MD;  [facility]     AMPUTATE LEG BELOW KNEE Right 11/7/2016    Procedure: AMPUTATE LEG BELOW KNEE;  Surgeon: Savannah Durant MD;  [facility]     AMPUTATE REVISION STUMP LOWER EXTREMITY Right 11/11/2016    Procedure: AMPUTATE REVISION STUMP LOWER EXTREMITY;  Surgeon: Savannah Durant MD;  [facility]     AMPUTATE REVISION STUMP LOWER EXTREMITY Right 11/16/2016    Procedure: AMPUTATE REVISION STUMP LOWER EXTREMITY;  Surgeon: Savannah Durant MD;  [facility]     AMPUTATE TOE(S) Right 1/5/2016    Procedure: AMPUTATE TOE(S);  Surgeon: Mello Gaines DPM;  Location:  [facility]     ANGIOGRAM Bilateral 11/21/2014    Procedure: ANGIOGRAM;  Surgeon: Savannah Durant MD;  Location:  OR     ANGIOGRAM Left 1/16/2015     Procedure: ANGIOGRAM;  Surgeon: Savannah Durant MD;  [facility]     ANGIOGRAM Bilateral 9/14/2015    Procedure: ANGIOGRAM;  Surgeon: Savannah Durant MD;  [facility]     ANGIOGRAM Left 10/12/2015    Procedure: ANGIOGRAM;  Surgeon: Savannah Durant MD;  [facility]     ANGIOGRAM Right 6/6/2016    Procedure: ANGIOGRAM;  Surgeon: Savannah Durant MD;  [facility]     ANGIOPLASTY Right 6/6/2016    Procedure: ANGIOPLASTY;  Surgeon: Savannah Durant MD;  [facility]     APPENDECTOMY       BREAST SURGERY      right breast bx (benign)     CATARACT IOL, RT/LT Right      CHOLECYSTECTOMY       COLONOSCOPY N/A 8/25/2014    Procedure: COLONOSCOPY;  Surgeon: Mello Ferrer MD;  [facility]     COLONOSCOPY WITH CO2 INSUFFLATION N/A 8/20/2014    Procedure: COLONOSCOPY WITH CO2 INSUFFLATION;  Surgeon: Duane, William Charles, MD;  [facility]     CYSTOSTOMY, INSERT TUBE SUPRAPUBIC, COMBINED N/A 1/16/2018    Procedure: COMBINED CYSTOSTOMY, INSERT TUBE SUPRAPUBIC;  Cystoscopy, Intraoperative Ultrasound, Suprapubic Tube Placement;  Surgeon: Keanu Dawson MD;  [facility]     ENDARTERECTOMY FEMORAL  5/23/2014    Procedure: ENDARTERECTOMY FEMORAL;  Surgeon: Jason Joshi MD;  Location:  OR     ESOPHAGOSCOPY, GASTROSCOPY, DUODENOSCOPY (EGD), COMBINED  12/14/2012    Procedure: COMBINED ESOPHAGOSCOPY, GASTROSCOPY, DUODENOSCOPY (EGD), BIOPSY SINGLE OR MULTIPLE;  ESOPHAGOSCOPY, GASTROSCOPY, DUODENOSCOPY (EGD), DILATATION ;  Surgeon: Elizabeth Stevenson MD;  Location:  GI     ESOPHAGOSCOPY, GASTROSCOPY, DUODENOSCOPY (EGD), COMBINED  12/31/2013    Procedure: COMBINED ESOPHAGOSCOPY, GASTROSCOPY, DUODENOSCOPY (EGD), BIOPSY SINGLE OR MULTIPLE;;  Surgeon: Clemente Lopez MD;  Location:  GI     ESOPHAGOSCOPY, GASTROSCOPY, DUODENOSCOPY (EGD), COMBINED  4/1/2014    Procedure: COMBINED ESOPHAGOSCOPY, GASTROSCOPY, DUODENOSCOPY (EGD);;  Surgeon: Clemente Lopez MD;  Location:  GI     ESOPHAGOSCOPY,  GASTROSCOPY, DUODENOSCOPY (EGD), COMBINED  6/28/2014    Procedure: COMBINED ESOPHAGOSCOPY, GASTROSCOPY, DUODENOSCOPY (EGD);  Surgeon: Clemente Lopez MD;  [facility]     ESOPHAGOSCOPY, GASTROSCOPY, DUODENOSCOPY (EGD), COMBINED N/A 8/20/2014    Procedure: COMBINED ESOPHAGOSCOPY, GASTROSCOPY, DUODENOSCOPY (EGD), BIOPSY SINGLE OR MULTIPLE;  Surgeon: Duane, William Charles, MD;  Location:  OR     ESOPHAGOSCOPY, GASTROSCOPY, DUODENOSCOPY (EGD), COMBINED N/A 8/22/2014    Procedure: COMBINED ESOPHAGOSCOPY, GASTROSCOPY, DUODENOSCOPY (EGD), BIOPSY SINGLE OR MULTIPLE;  Surgeon: Mello Ferrer MD;  Location:  GI     ESOPHAGOSCOPY, GASTROSCOPY, DUODENOSCOPY (EGD), COMBINED N/A 10/2/2014    Procedure: COMBINED ESOPHAGOSCOPY, GASTROSCOPY, DUODENOSCOPY (EGD), BIOPSY SINGLE OR MULTIPLE;  Surgeon: Remy Haskins MD;  [facility]     ESOPHAGOSCOPY, GASTROSCOPY, DUODENOSCOPY (EGD), COMBINED Left 12/15/2014    Procedure: COMBINED ESOPHAGOSCOPY, GASTROSCOPY, DUODENOSCOPY (EGD), BIOPSY SINGLE OR MULTIPLE;  Surgeon: Remy Haskins MD;  Location:  GI     ESOPHAGOSCOPY, GASTROSCOPY, DUODENOSCOPY (EGD), COMBINED N/A 2/25/2015    Procedure: COMBINED ENDOSCOPIC ULTRASOUND, ESOPHAGOSCOPY, GASTROSCOPY, DUODENOSCOPY (EGD), FINE NEEDLE ASPIRATE/BIOPSY;  Surgeon: Clemente Lugo MD;  [facility]     ESOPHAGOSCOPY, GASTROSCOPY, DUODENOSCOPY (EGD), COMBINED Left 2/25/2015    Procedure: COMBINED ESOPHAGOSCOPY, GASTROSCOPY, DUODENOSCOPY (EGD), BIOPSY SINGLE OR MULTIPLE;  Surgeon: Clemente Lugo MD;  [facility]     ESOPHAGOSCOPY, GASTROSCOPY, DUODENOSCOPY (EGD), COMBINED N/A 9/25/2016    Procedure: COMBINED ESOPHAGOSCOPY, GASTROSCOPY, DUODENOSCOPY (EGD);  Surgeon: Aziza Patiño MD;  [facility]     ESOPHAGOSCOPY, GASTROSCOPY, DUODENOSCOPY (EGD), COMBINED N/A 1/18/2017    Procedure: COMBINED ESOPHAGOSCOPY, GASTROSCOPY, DUODENOSCOPY (EGD), BIOPSY SINGLE OR MULTIPLE;  Surgeon: Clemente Lopez MD;   [facility]     ESOPHAGOSCOPY, GASTROSCOPY, DUODENOSCOPY (EGD), COMBINED N/A 11/26/2017    Procedure: COMBINED ESOPHAGOSCOPY, GASTROSCOPY, DUODENOSCOPY (EGD), REMOVE FOREIGN BODY;  Esophagogastroduodenoscopy with foreign body extraction  ;  Surgeon: Herberth Castrejon MD;  [facility]     ESOPHAGOSCOPY, GASTROSCOPY, DUODENOSCOPY (EGD), COMBINED N/A 11/26/2017    Procedure: COMBINED ESOPHAGOSCOPY, GASTROSCOPY, DUODENOSCOPY (EGD), REMOVE FOREIGN BODY;;  Surgeon: Herberth Castrejon MD;  [facility]     FASCIOTOMY LOWER EXTREMITY Left 6/10/2016    Procedure: FASCIOTOMY LOWER EXTREMITY;  Surgeon: Mello Rodriguez MD;  [facility]     HC CAPSULE ENDOSCOPY N/A 8/25/2014    Procedure: CAPSULE/PILL CAM ENDOSCOPY;  Surgeon: Remy Haskins MD;  [facility]     HC CAPSULE ENDOSCOPY N/A 10/2/2014    Procedure: CAPSULE/PILL CAM ENDOSCOPY;  Surgeon: Remy Haskins MD;  [facility]     ORTHOPEDIC SURGERY      broken wrist repair     SEX TRANSFORMATION SURGERY, MALE TO FEMALE      1974     SINUS SURGERY      cyst removed     TONSILLECTOMY       VASCULAR SURGERY      Left carotid stent            Social History:     Social History     Tobacco Use     Smoking status: Current Every Day Smoker     Packs/day: 0.25     Years: 30.00     Pack years: 7.50     Types: Cigarettes, Cigars     Smokeless tobacco: Never Used   Substance Use Topics     Alcohol use: No     Alcohol/week: 0.0 standard drinks     Ethnicity: black       Family History:     Family History   Problem Relation Age of Onset     Dementia Mother      Glaucoma Mother      Diabetes Mother         may have been type 1, childhood     Coronary Artery Disease Mother         MI     Glaucoma Father      Diabetes Father         may hev been type 1 - ??     Heart Failure Father      Cancer Maternal Aunt         leukemia     Schizophrenia Brother      Depression Brother      Suicide Sister      Depression Sister      Diabetes Sister       Cancer Maternal Aunt         ovarian     Glaucoma Maternal Grandmother      Diabetes Maternal Grandmother      Glaucoma Maternal Grandfather      Diabetes Maternal Grandfather      Glaucoma Paternal Grandmother      Diabetes Paternal Grandmother      Glaucoma Paternal Grandfather      Diabetes Paternal Grandfather      Breast Cancer Sister      Cerebrovascular Disease Brother      Colon Cancer No family hx of      Macular Degeneration No family hx of               Allergies:     Allergies   Allergen Reactions     Bee Venom      Penicillins Anaphylaxis     Other reaction(s): Skin Rash and/or Hives. Tolerated cefepime in 12/2016     Dilantin [Phenytoin] Other (See Comments)     Generic dilantin only per pt     Iodide Hives     09/11/15: Pt states she can use iodine and doesn't have any problems      Iodine-131      Novocaine [Procaine] Hives     Other reaction(s): Skin Rash and/or Hives     Tositumomab             Medications:     Current Outpatient Medications   Medication Sig     ACETAMINOPHEN PO Take 1,000 mg by mouth every 6 hours as needed for pain Not to exceed 4000 mg/day     ADVAIR DISKUS 250-50 MCG/DOSE diskus inhaler Inhale 1 puff into the lungs 2 times daily     albuterol (PROVENTIL) (2.5 MG/3ML) 0.083% neb solution INHALE 1 VIAL VIA NEBULIZER EVERY 6 HOURS AS NEEDED     alendronate (FOSAMAX) 70 MG tablet Take 1 tablet (70 mg) by mouth with 8oz water every 7 days 30 minutes before breakfast and remain upright during this time.     aspirin EC 81 MG EC tablet Take 1 tablet (81 mg) by mouth daily     atorvastatin (LIPITOR) 40 MG tablet TAKE 1 TAB BY MOUTH ONCE DAILY     blood glucose monitoring (ONE TOUCH ULTRA 2) meter device kit Use to test blood sugars 3 times daily or as directed.     blood glucose monitoring (ONE TOUCH ULTRA) test strip Use to test blood sugars 3 times daily or as directed.     blood glucose monitoring (ONE TOUCH ULTRASOFT) lancets Use to test blood sugar 3 times daily or as directed.      brimonidine (ALPHAGAN) 0.2 % ophthalmic solution Place 1 drop into the right eye 2 times daily     cetirizine (ZYRTEC) 10 MG tablet Take 1 tablet (10 mg) by mouth every evening     Cholecalciferol (VITAMIN D) 2000 UNITS tablet Take 2,000 Units by mouth daily.     ciprofloxacin (CIPRO) 500 MG tablet Take 1 tablet (500 mg) by mouth 2 times daily for 7 days     clotrimazole (LOTRIMIN) 1 % cream INSERT 1 APPLICATORFUL VAGINALLY TWICE A DAY FOR VAGINAL PAIN     clotrimazole (LOTRIMIN) 1 % external cream Apply topically 2 times daily     diclofenac (VOLTAREN) 1 % GEL topical gel Apply 2 g topically 4 times daily to hands     dorzolamide (TRUSOPT) 2 % ophthalmic solution Place 1 drop into the right eye 2 times daily     EPINEPHrine (EPIPEN/ADRENACLICK/OR ANY BX GENERIC EQUIV) 0.3 MG/0.3ML injection 2-pack Inject 0.3 mLs (0.3 mg) into the muscle as needed for anaphylaxis     escitalopram (LEXAPRO) 10 MG tablet TAKE 1 TAB BY MOUTH ONCE DAILY     ferrous sulfate (IRON) 325 (65 FE) MG tablet Take 1 tablet (325 mg) by mouth 2 times daily With meals     fluticasone (FLONASE) 50 MCG/ACT nasal spray Spray 1 spray into both nostrils daily     lactulose (CHRONULAC) 10 GM/15ML solution Take 20 g by mouth as needed for constipation     latanoprost (XALATAN) 0.005 % ophthalmic solution Place 1 drop into both eyes At Bedtime     levETIRAcetam (KEPPRA) 500 MG tablet TAKE 1 TAB BY MOUTH TWICE A DAY     lisinopril (PRINIVIL/ZESTRIL) 20 MG tablet TAKE 1 TAB BY MOUTH ONCE DAILY     metFORMIN (GLUCOPHAGE) 500 MG tablet TAKE 1 TAB BY MOUTH IN THE EVENING WITH DINNER     metoprolol succinate ER (TOPROL-XL) 50 MG 24 hr tablet TAKE 1 TAB BY MOUTH ONCE DAILY     nicotine (NICODERM CQ) 14 MG/24HR 24 hr patch Place 1 patch onto the skin every 24 hours     nitroglycerin (NITROSTAT) 0.4 MG SL tablet Place 1 tablet (0.4 mg) under the tongue every 5 minutes as needed for chest pain if you are still having symptoms after 3 doses (15 minutes) call 911.      nystatin (MYCOSTATIN) 329100 UNIT/ML suspension Take 5 mLs (500,000 Units) by mouth 4 times daily for 7 days Swish solution and then spit     ondansetron (ZOFRAN-ODT) 8 MG ODT tab Take 1 tablet (8 mg) by mouth every 8 hours as needed for nausea     order for DME Equipment being ordered: Prosthetic legs     order for DME Equipment being ordered: Prosthetic     order for DME Equipment being ordered: lift recliner     order for DME Equipment being ordered: Hospital Bed with side rails     order for DME Equipment being ordered: Power Wheel Chair     order for DME Equipment being ordered: bandage tape, prefer paper     order for DME Full electric hospital bed with half rails    Dx: I72164, I110, J449  Length of need: lifetime     order for DME Wheel Chair Cushion: 18 x 18 inch Roho cushion     order for DME Hospital bed with side rails     order for DME Equipment being ordered: CPAP supplies mask, hose, filters, cushion    fax to [facility] at [phone]     order for DME Equipment being ordered: CPAP supplies mask, hose, filters, cushion fax to [facility] at [phone]  Disp: 10 Device Refills: prn   Class: Local Print Start: 2/10/2017     order for DME Equipment being ordered: Nebulizer and tubing supplies     order for DME Equipment being ordered: Glucerna daily shakes with each meal     ORDER FOR DME Equipment being ordered: Power Wheelchair     ORDER FOR DME Equipment being ordered: Depends briefs     oxyCODONE (ROXICODONE) 5 MG tablet TAKE 1 TABLET BY MOUTH EVERY 6 HOURS AS NEEDED     pantoprazole (PROTONIX) 40 MG EC tablet TAKE 1 TAB BY MOUTH ONCE DAILY     phenytoin (DILANTIN) 100 MG capsule TAKE 2 CAPS (200MG) BY MOUTH TWICE A DAY     polyethylene glycol (MIRALAX/GLYCOLAX) Packet Take 17 g by mouth daily Dissolved in water or juice      pregabalin (LYRICA) 75 MG capsule Take 150 mg by mouth 3 times daily     risperiDONE (RISPERDAL) 0.5 MG tablet TAKE 1 TAB BY MOUTH AT BEDTIME     sennosides  "(SENOKOT) 8.6 MG tablet Take 1 tablet by mouth 2 times daily      solifenacin (VESICARE) 10 MG tablet TAKE 1 TAB BY MOUTH ONCE DAILY     sucralfate (CARAFATE) 1 GM tablet TAKE 1 TAB BY MOUTH FOUR TIMES DAILY. MAY DISSOLVE IN 10ML WATER ISNECESSARY     sulfamethoxazole-trimethoprim (BACTRIM DS) 800-160 MG tablet Take 1 tablet by mouth 2 times daily for 10 days     traZODone (DESYREL) 150 MG tablet TAKE 1 TAB BY MOUTH AT BEDTIME     umeclidinium (INCRUSE ELLIPTA) 62.5 MCG/INH inhaler Inhale 1 puff into the lungs daily     VENTOLIN  (90 Base) MCG/ACT inhaler Inhale 2 puffs into the lungs every 6 hours as needed for shortness of breath / dyspnea or wheezing     No current facility-administered medications for this visit.              Review of Systems:    ROS: 14 point ROS neg other than the symptoms noted above in the HPI and PMH.          Physical Exam:    Data Unavailable, 0 lbs 0 oz,   Estimated body mass index is 52.09 kg/m  as calculated from the following:    Height as of 9/17/19: 1.092 m (3' 7\").    Weight as of 8/27/19: 62.1 kg (137 lb).  Constitutional: healthy, alert and no distress, in a wheelchair with bilateral AKA  Cardiovascular: regular rate, normal perfusion  Respiratory: normal work of breathing  Psychiatric: mentation appears normal and affect normal/bright  Head: Normocephalic. EOMI. Moist membranes  Abdomen: Abdomen soft, non-tender.   : SP tube intact, normal appearing external female genitalia, short introitus, meatus patent  NEURO: Grossly nonfocal  SKIN: Soft, dry  JOINT/EXTREMITIES: bilateral AKA            Data:    Imaging: CTAP from 5/2018  IMPRESSION:      1. No acute intra-abdominal or pelvic pathology suspected.   2. The suprapubic catheter balloon is inflated in the bladder which is  decompressed and otherwise unremarkable. No hydronephrosis or renal  calculus.  3. Small sliding-type hernia.    Labs:  Creatinine (Date = 9/17/19): 0.48    Outside records:  I spent 15 minutes " reviewing outside records. All past surgical history information was obtained from prior records and are as detailed in HPI.          Assessment and Plan:   69 year old female with who has a history of urgency, frequency and UUI who had a suprapubic tube placed for functional incontinence due to decreased mobility with bilateral AKA's. She has recurrent UTIs and urinary urgency/frequency. Also with open angle glaucoma and PVD so oral OAB medications are not good options. She is leaking from below daily despite SP tube and wearing a diaper. Urodynamics and botox were discussed with the patient. Specifically the risks of bleeding, infection, need for repeat injection after 4-6 months if this does work, possibility that she would still leak per urethra if her incontinence is a result of ISD but urodynamics will help with this.       Plan for UDS at next available.   Cysto + bladder Botox 200 units        Ghazala Hebert MD  September 22, 2019

## 2019-09-23 ENCOUNTER — ALLIED HEALTH/NURSE VISIT (OUTPATIENT)
Dept: UROLOGY | Facility: CLINIC | Age: 69
End: 2019-09-23
Payer: COMMERCIAL

## 2019-09-23 ENCOUNTER — OFFICE VISIT (OUTPATIENT)
Dept: UROLOGY | Facility: CLINIC | Age: 69
End: 2019-09-23
Payer: COMMERCIAL

## 2019-09-23 DIAGNOSIS — Z87.440 PERSONAL HISTORY OF URINARY TRACT INFECTION: ICD-10-CM

## 2019-09-23 DIAGNOSIS — N31.8 OTHER NEUROMUSCULAR DYSFUNCTION OF BLADDER: ICD-10-CM

## 2019-09-23 DIAGNOSIS — I73.9 PERIPHERAL VASCULAR DISEASE (H): ICD-10-CM

## 2019-09-23 DIAGNOSIS — N32.89 BLADDER SPASMS: ICD-10-CM

## 2019-09-23 DIAGNOSIS — R39.81 FUNCTIONAL INCONTINENCE: Primary | ICD-10-CM

## 2019-09-23 DIAGNOSIS — H40.10X0 OPEN-ANGLE GLAUCOMA, UNSPECIFIED GLAUCOMA STAGE, UNSPECIFIED LATERALITY, UNSPECIFIED OPEN-ANGLE GLAUCOMA TYPE: ICD-10-CM

## 2019-09-23 RX ORDER — CIPROFLOXACIN 2 MG/ML
400 INJECTION, SOLUTION INTRAVENOUS
Status: DISCONTINUED | OUTPATIENT
Start: 2019-09-23 | End: 2019-09-23 | Stop reason: HOSPADM

## 2019-09-23 ASSESSMENT — PAIN SCALES - GENERAL: PAINLEVEL: EXTREME PAIN (9)

## 2019-09-23 ASSESSMENT — PATIENT HEALTH QUESTIONNAIRE - PHQ9: SUM OF ALL RESPONSES TO PHQ QUESTIONS 1-9: 5

## 2019-09-23 NOTE — LETTER
"9/23/2019       RE: Sonya Foote  [address]  [facility] [address]     Dear Colleague,    Thank you for referring your patient, Sonya Foote, to the [facility] UROLOGY AND INST FOR PROSTATE AND UROLOGIC CANCERS at [facility]. Please see a copy of my visit note below.    New Consult for overactive bladder    Name: Sonya Foote    MRN: [id number]   YOB: 1950                 Chief Complaint:   Overactive Bladder, UTIs          History of Present Illness:   HISTORY:   Sonya Foote is a 69 year old female who used to see Dr. Oliver who current manages her bladder with a SP tube for functional incontinence (had bilateral AKA's so could not transfer to toilet). She has a complex medical history including PVD, bilateral AKA, chronic pain, chronic systolic CHF, HTN, DM2, CVA, seizure disorder, and schizoaffective disorder. Her SP was placed 1/2018 due to urgency and UUI and functional incontinence due to immobility. Last seen 7/2018 at which point Botox has been discussed but she was moving to [place] so this was not pursued. She did move to [place] but moved back because did not have care.     Recently seen in the ER for abdominal pain concerning for UTI:  Despite SP tube normally urinates 3-4x per day. However was voiding 6-8x per day.   Had abdominal pain and flank pain and bladder spasms. On Cipro.   She has recurrent UTIs - 5-6x in the last year.  Has tried vaginal estrogen cream but did not work.    She has a history of open angle glaucoma and significant peripheral vascular disease so she was deemed a poor candidate for medical management of OAB symptoms.    Cystoscopy 5/2017 by Dr. Oliver was normal.  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\"  At 13 years old her father decided he wanted her to be a girl so had surgery to remove \"boy " "parts\"  Had neovagina creation         Past Medical History:     Past Medical History:   Diagnosis Date     Anemia      Antiplatelet or antithrombotic long-term use      Arthritis      AS (sickle cell trait) (H) 10/8/2013     Blind left eye      BPPV (benign paroxysmal positional vertigo)      Chronic back pain      COPD (chronic obstructive pulmonary disease) (H)      Coronary artery disease      CVA (cerebral infarction) 10/8/2012    x3, residual left sided weakness     Diastolic CHF (H) 9/4/2012     Dilantin toxicity 5/31/2013     Esophageal candidiasis (H)      GERD (gastroesophageal reflux disease)      Heart attack (H)      Hernia, abdominal      History of blood transfusion     x5     History of thrombophlebitis      HTN (hypertension) 9/4/2012     Hyperlipidemia LDL goal <100 9/4/2012     Legally blind in right eye, as defined in [place]     No periphal vision right eye     Osteoporosis 1/21/2013     Other chronic pain      PAD (peripheral artery disease) (H)      Palpitations      Person who has had sex change operation 1/20/2014     Pneumonia      Schizoaffective disorder (H)      Seizure disorder (H) 9/4/2012     Sleep apnea     CPAP     Thrombosis of leg      Type 2 diabetes, HbA1C goal < 8% (H) 9/4/2012     Uncomplicated asthma      Unspecified cerebral artery occlusion with cerebral infarction             Past Surgical History:     Past Surgical History:   Procedure Laterality Date     AMPUTATE LEG ABOVE KNEE Left 6/11/2016    Procedure: AMPUTATE LEG ABOVE KNEE;  Surgeon: Mello Rodriguez MD;  [facility]     AMPUTATE LEG BELOW KNEE Right 11/7/2016    Procedure: AMPUTATE LEG BELOW KNEE;  Surgeon: Savannah Durant MD;  [facility]     AMPUTATE REVISION STUMP LOWER EXTREMITY Right 11/11/2016    Procedure: AMPUTATE REVISION STUMP LOWER EXTREMITY;  Surgeon: Savannah Durant MD;  [facility]     AMPUTATE REVISION STUMP LOWER EXTREMITY Right 11/16/2016    Procedure: AMPUTATE REVISION STUMP LOWER " EXTREMITY;  Surgeon: Savannah Durant MD;  [facility]     AMPUTATE TOE(S) Right 1/5/2016    Procedure: AMPUTATE TOE(S);  Surgeon: Mello Gaines DPM;  Location:  [facility]     ANGIOGRAM Bilateral 11/21/2014    Procedure: ANGIOGRAM;  Surgeon: Savannah Durant MD;  [facility]     ANGIOGRAM Left 1/16/2015    Procedure: ANGIOGRAM;  Surgeon: Savannah Durant MD;  [facility]     ANGIOGRAM Bilateral 9/14/2015    Procedure: ANGIOGRAM;  Surgeon: Savannah Durant MD;  [facility]     ANGIOGRAM Left 10/12/2015    Procedure: ANGIOGRAM;  Surgeon: Savannah Durant MD;  [facility]     ANGIOGRAM Right 6/6/2016    Procedure: ANGIOGRAM;  Surgeon: Savannah Durant MD;  [facility]     ANGIOPLASTY Right 6/6/2016    Procedure: ANGIOPLASTY;  Surgeon: Savannah Durant MD;  [facility]     APPENDECTOMY       BREAST SURGERY      right breast bx (benign)     CATARACT IOL, RT/LT Right      CHOLECYSTECTOMY       COLONOSCOPY N/A 8/25/2014    Procedure: COLONOSCOPY;  Surgeon: Mello Ferrer MD;  [facility]     COLONOSCOPY WITH CO2 INSUFFLATION N/A 8/20/2014    Procedure: COLONOSCOPY WITH CO2 INSUFFLATION;  Surgeon: Duane, William Charles, MD;  Location: [facility]     CYSTOSTOMY, INSERT TUBE SUPRAPUBIC, COMBINED N/A 1/16/2018    Procedure: COMBINED CYSTOSTOMY, INSERT TUBE SUPRAPUBIC;  Cystoscopy, Intraoperative Ultrasound, Suprapubic Tube Placement;  Surgeon: Keanu Dawson MD;  [facility]     ENDARTERECTOMY FEMORAL  5/23/2014    Procedure: ENDARTERECTOMY FEMORAL;  Surgeon: Jason Joshi MD;  [facility]     ESOPHAGOSCOPY, GASTROSCOPY, DUODENOSCOPY (EGD), COMBINED  12/14/2012    Procedure: COMBINED ESOPHAGOSCOPY, GASTROSCOPY, DUODENOSCOPY (EGD), BIOPSY SINGLE OR MULTIPLE;  ESOPHAGOSCOPY, GASTROSCOPY, DUODENOSCOPY (EGD), DILATATION ;  Surgeon: Elizabeth Stevenson MD;  Location:  GI     ESOPHAGOSCOPY, GASTROSCOPY, DUODENOSCOPY (EGD), COMBINED  12/31/2013    Procedure: COMBINED ESOPHAGOSCOPY, GASTROSCOPY,  DUODENOSCOPY (EGD), BIOPSY SINGLE OR MULTIPLE;;  Surgeon: Clemente Lopez MD;  [facility]     ESOPHAGOSCOPY, GASTROSCOPY, DUODENOSCOPY (EGD), COMBINED  4/1/2014    Procedure: COMBINED ESOPHAGOSCOPY, GASTROSCOPY, DUODENOSCOPY (EGD);;  Surgeon: Clemente Lopez MD;  [facility]     ESOPHAGOSCOPY, GASTROSCOPY, DUODENOSCOPY (EGD), COMBINED  6/28/2014    Procedure: COMBINED ESOPHAGOSCOPY, GASTROSCOPY, DUODENOSCOPY (EGD);  Surgeon: Clemente Lopez MD;  [facility]     ESOPHAGOSCOPY, GASTROSCOPY, DUODENOSCOPY (EGD), COMBINED N/A 8/20/2014    Procedure: COMBINED ESOPHAGOSCOPY, GASTROSCOPY, DUODENOSCOPY (EGD), BIOPSY SINGLE OR MULTIPLE;  Surgeon: Duane, William Charles, MD;  Location:  OR     ESOPHAGOSCOPY, GASTROSCOPY, DUODENOSCOPY (EGD), COMBINED N/A 8/22/2014    Procedure: COMBINED ESOPHAGOSCOPY, GASTROSCOPY, DUODENOSCOPY (EGD), BIOPSY SINGLE OR MULTIPLE;  Surgeon: Mello Ferrer MD;  [facility]     ESOPHAGOSCOPY, GASTROSCOPY, DUODENOSCOPY (EGD), COMBINED N/A 10/2/2014    Procedure: COMBINED ESOPHAGOSCOPY, GASTROSCOPY, DUODENOSCOPY (EGD), BIOPSY SINGLE OR MULTIPLE;  Surgeon: Remy Haskins MD;  [facility]     ESOPHAGOSCOPY, GASTROSCOPY, DUODENOSCOPY (EGD), COMBINED Left 12/15/2014    Procedure: COMBINED ESOPHAGOSCOPY, GASTROSCOPY, DUODENOSCOPY (EGD), BIOPSY SINGLE OR MULTIPLE;  Surgeon: Remy Haskins MD;  [facility]     ESOPHAGOSCOPY, GASTROSCOPY, DUODENOSCOPY (EGD), COMBINED N/A 2/25/2015    Procedure: COMBINED ENDOSCOPIC ULTRASOUND, ESOPHAGOSCOPY, GASTROSCOPY, DUODENOSCOPY (EGD), FINE NEEDLE ASPIRATE/BIOPSY;  Surgeon: Clemente Lugo MD;  [facility]     ESOPHAGOSCOPY, GASTROSCOPY, DUODENOSCOPY (EGD), COMBINED Left 2/25/2015    Procedure: COMBINED ESOPHAGOSCOPY, GASTROSCOPY, DUODENOSCOPY (EGD), BIOPSY SINGLE OR MULTIPLE;  Surgeon: Clemente Lugo MD;  Location:  GI     ESOPHAGOSCOPY, GASTROSCOPY, DUODENOSCOPY (EGD), COMBINED N/A 9/25/2016    Procedure:  COMBINED ESOPHAGOSCOPY, GASTROSCOPY, DUODENOSCOPY (EGD);  Surgeon: Aziza Patiño MD;  [facility]     ESOPHAGOSCOPY, GASTROSCOPY, DUODENOSCOPY (EGD), COMBINED N/A 1/18/2017    Procedure: COMBINED ESOPHAGOSCOPY, GASTROSCOPY, DUODENOSCOPY (EGD), BIOPSY SINGLE OR MULTIPLE;  Surgeon: Clemente Lopez MD;  [facility]     ESOPHAGOSCOPY, GASTROSCOPY, DUODENOSCOPY (EGD), COMBINED N/A 11/26/2017    Procedure: COMBINED ESOPHAGOSCOPY, GASTROSCOPY, DUODENOSCOPY (EGD), REMOVE FOREIGN BODY;  Esophagogastroduodenoscopy with foreign body extraction  ;  Surgeon: Herberth Castrejon MD;  [facility]     ESOPHAGOSCOPY, GASTROSCOPY, DUODENOSCOPY (EGD), COMBINED N/A 11/26/2017    Procedure: COMBINED ESOPHAGOSCOPY, GASTROSCOPY, DUODENOSCOPY (EGD), REMOVE FOREIGN BODY;;  Surgeon: Herberth Castrejon MD;  [facility]     FASCIOTOMY LOWER EXTREMITY Left 6/10/2016    Procedure: FASCIOTOMY LOWER EXTREMITY;  Surgeon: Mello Rodriguez MD;  [facility]     HC CAPSULE ENDOSCOPY N/A 8/25/2014    Procedure: CAPSULE/PILL CAM ENDOSCOPY;  Surgeon: Remy Haskins MD;  [facility]     HC CAPSULE ENDOSCOPY N/A 10/2/2014    Procedure: CAPSULE/PILL CAM ENDOSCOPY;  Surgeon: Remy Haskins MD;  [facility]     ORTHOPEDIC SURGERY      broken wrist repair     SEX TRANSFORMATION SURGERY, MALE TO FEMALE      1974     SINUS SURGERY      cyst removed     TONSILLECTOMY       VASCULAR SURGERY      Left carotid stent            Social History:     Social History     Tobacco Use     Smoking status: Current Every Day Smoker     Packs/day: 0.25     Years: 30.00     Pack years: 7.50     Types: Cigarettes, Cigars     Smokeless tobacco: Never Used   Substance Use Topics     Alcohol use: No     Alcohol/week: 0.0 standard drinks     Ethnicity: black       Family History:     Family History   Problem Relation Age of Onset     Dementia Mother      Glaucoma Mother      Diabetes Mother         may have been type 1, childhood      Coronary Artery Disease Mother         MI     Glaucoma Father      Diabetes Father         may hev been type 1 - ??     Heart Failure Father      Cancer Maternal Aunt         leukemia     Schizophrenia Brother      Depression Brother      Suicide Sister      Depression Sister      Diabetes Sister      Cancer Maternal Aunt         ovarian     Glaucoma Maternal Grandmother      Diabetes Maternal Grandmother      Glaucoma Maternal Grandfather      Diabetes Maternal Grandfather      Glaucoma Paternal Grandmother      Diabetes Paternal Grandmother      Glaucoma Paternal Grandfather      Diabetes Paternal Grandfather      Breast Cancer Sister      Cerebrovascular Disease Brother      Colon Cancer No family hx of      Macular Degeneration No family hx of               Allergies:     Allergies   Allergen Reactions     Bee Venom      Penicillins Anaphylaxis     Other reaction(s): Skin Rash and/or Hives. Tolerated cefepime in 12/2016     Dilantin [Phenytoin] Other (See Comments)     Generic dilantin only per pt     Iodide Hives     09/11/15: Pt states she can use iodine and doesn't have any problems      Iodine-131      Novocaine [Procaine] Hives     Other reaction(s): Skin Rash and/or Hives     Tositumomab             Medications:     Current Outpatient Medications   Medication Sig     ACETAMINOPHEN PO Take 1,000 mg by mouth every 6 hours as needed for pain Not to exceed 4000 mg/day     ADVAIR DISKUS 250-50 MCG/DOSE diskus inhaler Inhale 1 puff into the lungs 2 times daily     albuterol (PROVENTIL) (2.5 MG/3ML) 0.083% neb solution INHALE 1 VIAL VIA NEBULIZER EVERY 6 HOURS AS NEEDED     alendronate (FOSAMAX) 70 MG tablet Take 1 tablet (70 mg) by mouth with 8oz water every 7 days 30 minutes before breakfast and remain upright during this time.     aspirin EC 81 MG EC tablet Take 1 tablet (81 mg) by mouth daily     atorvastatin (LIPITOR) 40 MG tablet TAKE 1 TAB BY MOUTH ONCE DAILY     blood glucose monitoring (ONE TOUCH ULTRA  2) meter device kit Use to test blood sugars 3 times daily or as directed.     blood glucose monitoring (ONE TOUCH ULTRA) test strip Use to test blood sugars 3 times daily or as directed.     blood glucose monitoring (ONE TOUCH ULTRASOFT) lancets Use to test blood sugar 3 times daily or as directed.     brimonidine (ALPHAGAN) 0.2 % ophthalmic solution Place 1 drop into the right eye 2 times daily     cetirizine (ZYRTEC) 10 MG tablet Take 1 tablet (10 mg) by mouth every evening     Cholecalciferol (VITAMIN D) 2000 UNITS tablet Take 2,000 Units by mouth daily.     ciprofloxacin (CIPRO) 500 MG tablet Take 1 tablet (500 mg) by mouth 2 times daily for 7 days     clotrimazole (LOTRIMIN) 1 % cream INSERT 1 APPLICATORFUL VAGINALLY TWICE A DAY FOR VAGINAL PAIN     clotrimazole (LOTRIMIN) 1 % external cream Apply topically 2 times daily     diclofenac (VOLTAREN) 1 % GEL topical gel Apply 2 g topically 4 times daily to hands     dorzolamide (TRUSOPT) 2 % ophthalmic solution Place 1 drop into the right eye 2 times daily     EPINEPHrine (EPIPEN/ADRENACLICK/OR ANY BX GENERIC EQUIV) 0.3 MG/0.3ML injection 2-pack Inject 0.3 mLs (0.3 mg) into the muscle as needed for anaphylaxis     escitalopram (LEXAPRO) 10 MG tablet TAKE 1 TAB BY MOUTH ONCE DAILY     ferrous sulfate (IRON) 325 (65 FE) MG tablet Take 1 tablet (325 mg) by mouth 2 times daily With meals     fluticasone (FLONASE) 50 MCG/ACT nasal spray Spray 1 spray into both nostrils daily     lactulose (CHRONULAC) 10 GM/15ML solution Take 20 g by mouth as needed for constipation     latanoprost (XALATAN) 0.005 % ophthalmic solution Place 1 drop into both eyes At Bedtime     levETIRAcetam (KEPPRA) 500 MG tablet TAKE 1 TAB BY MOUTH TWICE A DAY     lisinopril (PRINIVIL/ZESTRIL) 20 MG tablet TAKE 1 TAB BY MOUTH ONCE DAILY     metFORMIN (GLUCOPHAGE) 500 MG tablet TAKE 1 TAB BY MOUTH IN THE EVENING WITH DINNER     metoprolol succinate ER (TOPROL-XL) 50 MG 24 hr tablet TAKE 1 TAB BY MOUTH  ONCE DAILY     nicotine (NICODERM CQ) 14 MG/24HR 24 hr patch Place 1 patch onto the skin every 24 hours     nitroglycerin (NITROSTAT) 0.4 MG SL tablet Place 1 tablet (0.4 mg) under the tongue every 5 minutes as needed for chest pain if you are still having symptoms after 3 doses (15 minutes) call 911.     nystatin (MYCOSTATIN) 800617 UNIT/ML suspension Take 5 mLs (500,000 Units) by mouth 4 times daily for 7 days Swish solution and then spit     ondansetron (ZOFRAN-ODT) 8 MG ODT tab Take 1 tablet (8 mg) by mouth every 8 hours as needed for nausea     order for DME Equipment being ordered: Prosthetic legs     order for DME Equipment being ordered: Prosthetic     order for DME Equipment being ordered: lift recliner     order for DME Equipment being ordered: Hospital Bed with side rails     order for DME Equipment being ordered: Power Wheel Chair     order for DME Equipment being ordered: bandage tape, prefer paper     order for DME Full electric hospital bed with half rails    Dx: E12931, I110, J449  Length of need: lifetime     order for DME Wheel Chair Cushion: 18 x 18 inch Roho cushion     order for DME Hospital bed with side rails     order for DME Equipment being ordered: CPAP supplies mask, hose, filters, cushion    fax to [facility] at [phone]     order for DME Equipment being ordered: CPAP supplies mask, hose, filters, cushion fax to [facility] at [phone]  Disp: 10 Device Refills: prn   Class: Local Print Start: 2/10/2017     order for DME Equipment being ordered: Nebulizer and tubing supplies     order for DME Equipment being ordered: Glucerna daily shakes with each meal     ORDER FOR DME Equipment being ordered: Power Wheelchair     ORDER FOR DME Equipment being ordered: Depends briefs     oxyCODONE (ROXICODONE) 5 MG tablet TAKE 1 TABLET BY MOUTH EVERY 6 HOURS AS NEEDED     pantoprazole (PROTONIX) 40 MG EC tablet TAKE 1 TAB BY MOUTH ONCE DAILY     phenytoin (DILANTIN) 100 MG capsule TAKE 2  "CAPS (200MG) BY MOUTH TWICE A DAY     polyethylene glycol (MIRALAX/GLYCOLAX) Packet Take 17 g by mouth daily Dissolved in water or juice      pregabalin (LYRICA) 75 MG capsule Take 150 mg by mouth 3 times daily     risperiDONE (RISPERDAL) 0.5 MG tablet TAKE 1 TAB BY MOUTH AT BEDTIME     sennosides (SENOKOT) 8.6 MG tablet Take 1 tablet by mouth 2 times daily      solifenacin (VESICARE) 10 MG tablet TAKE 1 TAB BY MOUTH ONCE DAILY     sucralfate (CARAFATE) 1 GM tablet TAKE 1 TAB BY MOUTH FOUR TIMES DAILY. MAY DISSOLVE IN 10ML WATER ISNECESSARY     sulfamethoxazole-trimethoprim (BACTRIM DS) 800-160 MG tablet Take 1 tablet by mouth 2 times daily for 10 days     traZODone (DESYREL) 150 MG tablet TAKE 1 TAB BY MOUTH AT BEDTIME     umeclidinium (INCRUSE ELLIPTA) 62.5 MCG/INH inhaler Inhale 1 puff into the lungs daily     VENTOLIN  (90 Base) MCG/ACT inhaler Inhale 2 puffs into the lungs every 6 hours as needed for shortness of breath / dyspnea or wheezing     No current facility-administered medications for this visit.              Review of Systems:    ROS: 14 point ROS neg other than the symptoms noted above in the HPI and PMH.          Physical Exam:    Data Unavailable, 0 lbs 0 oz,   Estimated body mass index is 52.09 kg/m  as calculated from the following:    Height as of 9/17/19: 1.092 m (3' 7\").    Weight as of 8/27/19: 62.1 kg (137 lb).  Constitutional: healthy, alert and no distress, in a wheelchair with bilateral AKA  Cardiovascular: regular rate, normal perfusion  Respiratory: normal work of breathing  Psychiatric: mentation appears normal and affect normal/bright  Head: Normocephalic. EOMI. Moist membranes  Abdomen: Abdomen soft, non-tender.   : SP tube intact, normal appearing external female genitalia, short introitus, meatus patent  NEURO: Grossly nonfocal  SKIN: Soft, dry  JOINT/EXTREMITIES: bilateral AKA            Data:    Imaging: CTAP from 5/2018  IMPRESSION:      1. No acute intra-abdominal or " pelvic pathology suspected.   2. The suprapubic catheter balloon is inflated in the bladder which is  decompressed and otherwise unremarkable. No hydronephrosis or renal  calculus.  3. Small sliding-type hernia.    Labs:  Creatinine (Date = 9/17/19): 0.48    Outside records:  I spent 15 minutes reviewing outside records. All past surgical history information was obtained from prior records and are as detailed in HPI.          Assessment and Plan:   69 year old female with who has a history of urgency, frequency and UUI who had a suprapubic tube placed for functional incontinence due to decreased mobility with bilateral AKA's. She has recurrent UTIs and urinary urgency/frequency. Also with open angle glaucoma and PVD so oral OAB medications are not good options. She is leaking from below daily despite SP tube and wearing a diaper. Urodynamics and botox were discussed with the patient. Specifically the risks of bleeding, infection, need for repeat injection after 4-6 months if this does work, possibility that she would still leak per urethra if her incontinence is a result of ISD but urodynamics will help with this.       Plan for UDS at next available.   Cysto + bladder Botox 200 units        Ghazala Hebert MD   September 22, 2019       Again, thank you for allowing me to participate in the care of your patient.      Sincerely,    Ghazala Hebert MD

## 2019-09-23 NOTE — NURSING NOTE
Chief Complaint   Patient presents with     Consult     Bladder botox consult       not currently breastfeeding. There is no height or weight on file to calculate BMI.    Patient Active Problem List   Diagnosis     Seizure disorder (H)     Osteoporosis     Schizoaffective disorder (H)     AS (sickle cell trait) (H)     Vertigo     Person who has had sex change operation     Claudication in peripheral vascular disease (H)     Intestinal malabsorption     GI bleed     Cervicalgia     Asthma     Adjustment disorder with depressed mood     Chronic pain syndrome     Open-angle glaucoma     Hx of colonic polyp     Iron deficiency anemia     Late effect of stroke     Degeneration of intervertebral disc of lumbosacral region     Thoracic or lumbosacral neuritis or radiculitis     Cerebral infarction due to occlusion or stenosis of carotid artery     Disorder of bone and cartilage     Hereditary and idiopathic peripheral neuropathy     Androgen insensitivity syndrome     PAD (peripheral artery disease) (H)     Chronic systolic congestive heart failure (H)     Stenosis of carotid artery     Osteoarthritis     Pain in both upper extremities     Atherosclerotic peripheral vascular disease with rest pain (H)     Essential hypertension     Type 2 diabetes mellitus with diabetic peripheral angiopathy without gangrene, without long-term current use of insulin (H)     Anemia, unspecified type     Critical lower limb ischemia     Testicular feminization     Anxiety disorder due to general medical condition     Central retinal artery occlusion     Lumbosacral radiculitis     Peripheral neuropathy     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 (H)     Primary open angle glaucoma of both eyes, severe stage     Pseudophakia of right eye     Cataract, left eye     Diabetes mellitus type 2 without retinopathy (H)     Simple chronic bronchitis (H)     JOSE (obstructive sleep apnea)     Complex sleep apnea syndrome      Coronary artery disease of native artery of native heart with stable angina pectoris (H)     Ischemic cardiomyopathy     Bee sting reaction, undetermined intent, subsequent encounter     Functional incontinence     Personal history of urinary tract infection     Dysphagia     Hyperlipidemia LDL goal <70     Incontinence     Other migraine without status migrainosus, not intractable     CVA, old, hemiparesis (H)     Tobacco abuse     S/P AKA (above knee amputation) bilateral (H)     Chronic, continuous use of opioids     Bladder spasm     History of myocardial infarction       Allergies   Allergen Reactions     Bee Venom      Penicillins Anaphylaxis     Other reaction(s): Skin Rash and/or Hives. Tolerated cefepime in 12/2016     Dilantin [Phenytoin] Other (See Comments)     Generic dilantin only per pt     Iodide Hives     09/11/15: Pt states she can use iodine and doesn't have any problems      Iodine-131      Novocaine [Procaine] Hives     Other reaction(s): Skin Rash and/or Hives     Tositumomab        Current Outpatient Medications   Medication Sig Dispense Refill     ACETAMINOPHEN PO Take 1,000 mg by mouth every 6 hours as needed for pain Not to exceed 4000 mg/day       ADVAIR DISKUS 250-50 MCG/DOSE diskus inhaler Inhale 1 puff into the lungs 2 times daily 60 Inhaler 11     albuterol (PROVENTIL) (2.5 MG/3ML) 0.083% neb solution INHALE 1 VIAL VIA NEBULIZER EVERY 6 HOURS AS NEEDED 360 mL 11     alendronate (FOSAMAX) 70 MG tablet Take 1 tablet (70 mg) by mouth with 8oz water every 7 days 30 minutes before breakfast and remain upright during this time. 12 tablet 3     aspirin EC 81 MG EC tablet Take 1 tablet (81 mg) by mouth daily 90 tablet 3     atorvastatin (LIPITOR) 40 MG tablet TAKE 1 TAB BY MOUTH ONCE DAILY 30 tablet 11     blood glucose monitoring (ONE TOUCH ULTRA 2) meter device kit Use to test blood sugars 3 times daily or as directed. 1 kit 0     blood glucose monitoring (ONE TOUCH ULTRA) test strip Use to  test blood sugars 3 times daily or as directed. 3 Box 3     blood glucose monitoring (ONE TOUCH ULTRASOFT) lancets Use to test blood sugar 3 times daily or as directed. 100 each PRN     brimonidine (ALPHAGAN) 0.2 % ophthalmic solution Place 1 drop into the right eye 2 times daily 1 Bottle 11     cetirizine (ZYRTEC) 10 MG tablet Take 1 tablet (10 mg) by mouth every evening 30 tablet 3     Cholecalciferol (VITAMIN D) 2000 UNITS tablet Take 2,000 Units by mouth daily. 100 tablet 3     ciprofloxacin (CIPRO) 500 MG tablet Take 1 tablet (500 mg) by mouth 2 times daily for 7 days 14 tablet 0     clotrimazole (LOTRIMIN) 1 % cream INSERT 1 APPLICATORFUL VAGINALLY TWICE A DAY FOR VAGINAL PAIN 12 g 0     clotrimazole (LOTRIMIN) 1 % external cream Apply topically 2 times daily 12 g 0     diclofenac (VOLTAREN) 1 % GEL topical gel Apply 2 g topically 4 times daily to hands 100 g 11     dorzolamide (TRUSOPT) 2 % ophthalmic solution Place 1 drop into the right eye 2 times daily 1 Bottle 11     EPINEPHrine (EPIPEN/ADRENACLICK/OR ANY BX GENERIC EQUIV) 0.3 MG/0.3ML injection 2-pack Inject 0.3 mLs (0.3 mg) into the muscle as needed for anaphylaxis 0.6 mL 1     escitalopram (LEXAPRO) 10 MG tablet TAKE 1 TAB BY MOUTH ONCE DAILY 30 tablet 11     ferrous sulfate (IRON) 325 (65 FE) MG tablet Take 1 tablet (325 mg) by mouth 2 times daily With meals 60 tablet 2     fluticasone (FLONASE) 50 MCG/ACT nasal spray Spray 1 spray into both nostrils daily       lactulose (CHRONULAC) 10 GM/15ML solution Take 20 g by mouth as needed for constipation       latanoprost (XALATAN) 0.005 % ophthalmic solution Place 1 drop into both eyes At Bedtime 2.5 mL 11     levETIRAcetam (KEPPRA) 500 MG tablet TAKE 1 TAB BY MOUTH TWICE A DAY 60 tablet 5     lisinopril (PRINIVIL/ZESTRIL) 20 MG tablet TAKE 1 TAB BY MOUTH ONCE DAILY 30 tablet 11     metFORMIN (GLUCOPHAGE) 500 MG tablet TAKE 1 TAB BY MOUTH IN THE EVENING WITH DINNER 30 tablet 5     metoprolol succinate ER  (TOPROL-XL) 50 MG 24 hr tablet TAKE 1 TAB BY MOUTH ONCE DAILY 30 tablet 11     nicotine (NICODERM CQ) 14 MG/24HR 24 hr patch Place 1 patch onto the skin every 24 hours 30 patch 3     nitroglycerin (NITROSTAT) 0.4 MG SL tablet Place 1 tablet (0.4 mg) under the tongue every 5 minutes as needed for chest pain if you are still having symptoms after 3 doses (15 minutes) call 911. 25 tablet 1     nystatin (MYCOSTATIN) 824209 UNIT/ML suspension Take 5 mLs (500,000 Units) by mouth 4 times daily for 7 days Swish solution and then spit 140 mL 0     ondansetron (ZOFRAN-ODT) 8 MG ODT tab Take 1 tablet (8 mg) by mouth every 8 hours as needed for nausea 30 tablet 0     order for DME Equipment being ordered: Prosthetic legs 2 each 0     order for DME Equipment being ordered: Prosthetic 1 each 0     order for DME Equipment being ordered: lift recliner 1 Device 0     order for DME Equipment being ordered: Hospital Bed with side rails 1 each 0     order for DME Equipment being ordered: Power Wheel Chair 1 Device 0     order for DME Equipment being ordered: bandage tape, prefer paper 1 Package 0     order for DME Full electric hospital bed with half rails    Dx: K79148, I110, J449  Length of need: lifetime 1 Device 0     order for DME Wheel Chair Cushion: 18 x 18 inch Roho cushion 1 Device 0     order for DME Hospital bed with side rails 1 Device 0     order for DME Equipment being ordered: CPAP supplies mask, hose, filters, cushion    fax to [facility] at [phone] 10 Device prn     order for DME Equipment being ordered: CPAP supplies mask, hose, filters, cushion fax to [facility] at [phone]  Disp: 10 Device Refills: prn   Class: Local Print Start: 2/10/2017 1 Device 0     order for DME Equipment being ordered: Nebulizer and tubing supplies 1 Units 0     order for DME Equipment being ordered: Glucerna daily shakes with each meal 1 Box 11     ORDER FOR DME Equipment being ordered: Power Wheelchair 1 Device 0     ORDER  FOR DME Equipment being ordered: Depends briefs 1 Month 11     oxyCODONE (ROXICODONE) 5 MG tablet TAKE 1 TABLET BY MOUTH EVERY 6 HOURS AS NEEDED  0     pantoprazole (PROTONIX) 40 MG EC tablet TAKE 1 TAB BY MOUTH ONCE DAILY 30 tablet 11     phenytoin (DILANTIN) 100 MG capsule TAKE 2 CAPS (200MG) BY MOUTH TWICE A  capsule 11     polyethylene glycol (MIRALAX/GLYCOLAX) Packet Take 17 g by mouth daily Dissolved in water or juice        pregabalin (LYRICA) 75 MG capsule Take 150 mg by mouth 3 times daily       risperiDONE (RISPERDAL) 0.5 MG tablet TAKE 1 TAB BY MOUTH AT BEDTIME 30 tablet 5     sennosides (SENOKOT) 8.6 MG tablet Take 1 tablet by mouth 2 times daily        solifenacin (VESICARE) 10 MG tablet TAKE 1 TAB BY MOUTH ONCE DAILY 30 tablet 11     sucralfate (CARAFATE) 1 GM tablet TAKE 1 TAB BY MOUTH FOUR TIMES DAILY. MAY DISSOLVE IN 10ML WATER ISNECESSARY 120 tablet 11     sulfamethoxazole-trimethoprim (BACTRIM DS) 800-160 MG tablet Take 1 tablet by mouth 2 times daily for 10 days 20 tablet 0     traZODone (DESYREL) 150 MG tablet TAKE 1 TAB BY MOUTH AT BEDTIME 30 tablet 11     umeclidinium (INCRUSE ELLIPTA) 62.5 MCG/INH inhaler Inhale 1 puff into the lungs daily 1 Inhaler 6     VENTOLIN  (90 Base) MCG/ACT inhaler Inhale 2 puffs into the lungs every 6 hours as needed for shortness of breath / dyspnea or wheezing 8.5 g 11       Social History     Tobacco Use     Smoking status: Current Every Day Smoker     Packs/day: 0.25     Years: 30.00     Pack years: 7.50     Types: Cigarettes, Cigars     Smokeless tobacco: Never Used   Substance Use Topics     Alcohol use: No     Alcohol/week: 0.0 standard drinks     Drug use: No       JASON Shepard  9/23/2019  4:27 PM

## 2019-09-23 NOTE — PROGRESS NOTES
Pre Op Teaching Flowsheet       Pre and Post op Patient Education  Relevant Diagnosis:  Other neuromuscular dysfunction of bladder    Teaching Topic:  Pre and post op teaching for Cystoscopy, botox injection  Person Involved in teaching:  Sonya Foote      Motivation Level:  Asks Questions: Yes  Eager to Learn:  Yes  Cooperative: Yes  Receptive (willing/able to accept information):  Yes  Patient demonstrates understanding of the following:  Date and time of surgery:  10/30/19  Location of surgery: [facility]- 5th Floor  History and Physical and any other testing necessary prior to surgery: Yes  Required time line for completion of History and Physical and any pre-op testing: Yes    NPO Guidelines: NPO per Anesthesia Guidelines    Patient demonstrates understanding of the following:  Patient understands the need for a responsible adult to drive them home and someone to stay with them for the first 24 hours post-operatively: YES   Pre-op bowel prep: No, not needed  Pre-op showering/scrub information with Hibiclens Soap: Yes  Medications to take the day of surgery:  Per PCP  Blood thinner medications discussed and when to stop (if applicable):  Yes  Diabetes medication management (if applicable):  N/A  Discussed pain control after surgery: pain scale, pain medications and pain management techniques  Infection Prevention: Patient demonstrates understanding of the following:  Patient instructed on hand hygiene:  Yes  Surgical procedure site care taught: Yes  Signs and symptoms of infection taught:  Yes  Wound care will be taught at the time of discharge.  Central venous catheter care will be taught at the time of discharge (if applicable).    Post-op follow-up:  Discussed how to contact the hospital, nurse, and clinic scheduling staff if necessary.    Instructional materials used/given/mailed:  [facility] Surgery Booklet, post op teaching sheet, Map, Soap, and arrival/location  information.    Surgical instructions given to patient in clinic: Yes.    Instructional Materials given:  Before your surgery packet , Medications to avoid before surgery , Showering or Bathing instructions before surgery  and What to expect after surgery    Post-op appointment/testing scheduled per MD orders: Yes    Total time with patient: 10 minutes    Lexi Johnson RN, BSN  Urology Care Coordinator

## 2019-09-23 NOTE — PATIENT INSTRUCTIONS
Please schedule [employer].      It was a pleasure meeting with you today.  Thank you for allowing me and my team the privilege of caring for you today.  YOU are the reason we are here, and I truly hope we provided you with the excellent service you deserve.  Please let us know if there is anything else we can do for you so that we can be sure you are leaving completely satisfied with your care experience.

## 2019-09-24 ENCOUNTER — HOSPITAL ENCOUNTER (OUTPATIENT)
Facility: AMBULATORY SURGERY CENTER | Age: 69
End: 2019-09-24
Attending: UROLOGY
Payer: COMMERCIAL

## 2019-09-24 ENCOUNTER — OFFICE VISIT (OUTPATIENT)
Dept: OPHTHALMOLOGY | Facility: CLINIC | Age: 69
End: 2019-09-24
Attending: OPHTHALMOLOGY
Payer: COMMERCIAL

## 2019-09-24 DIAGNOSIS — N31.8 OTHER NEUROMUSCULAR DYSFUNCTION OF BLADDER: ICD-10-CM

## 2019-09-24 DIAGNOSIS — H40.1133 PRIMARY OPEN ANGLE GLAUCOMA OF BOTH EYES, SEVERE STAGE: Primary | ICD-10-CM

## 2019-09-24 DIAGNOSIS — Z96.1 PSEUDOPHAKIA OF RIGHT EYE: ICD-10-CM

## 2019-09-24 PROCEDURE — G0463 HOSPITAL OUTPT CLINIC VISIT: HCPCS | Mod: ZF

## 2019-09-24 PROCEDURE — 92250 FUNDUS PHOTOGRAPHY W/I&R: CPT | Mod: ZF | Performed by: OPHTHALMOLOGY

## 2019-09-24 ASSESSMENT — CONF VISUAL FIELD
OS_SUPERIOR_NASAL_RESTRICTION: 1
OS_SUPERIOR_TEMPORAL_RESTRICTION: 1
OS_INFERIOR_NASAL_RESTRICTION: 1
OD_NORMAL: 1
OS_INFERIOR_TEMPORAL_RESTRICTION: 1

## 2019-09-24 ASSESSMENT — REFRACTION_WEARINGRX
SPECS_TYPE: TRIFOCAL
OS_ADD: +3.00
OS_CYLINDER: SPHERE
OD_CYLINDER: SPHERE
OS_SPHERE: -1.00
OD_ADD: +3.00
OD_SPHERE: -1.00

## 2019-09-24 ASSESSMENT — SLIT LAMP EXAM - LIDS
COMMENTS: NORMAL
COMMENTS: NORMAL

## 2019-09-24 ASSESSMENT — EXTERNAL EXAM - RIGHT EYE: OD_EXAM: NORMAL

## 2019-09-24 ASSESSMENT — TONOMETRY
IOP_METHOD: APPLANATION
OD_IOP_MMHG: 12
OS_IOP_MMHG: 12

## 2019-09-24 ASSESSMENT — PATIENT HEALTH QUESTIONNAIRE - PHQ9: SUM OF ALL RESPONSES TO PHQ QUESTIONS 1-9: 21

## 2019-09-24 ASSESSMENT — CUP TO DISC RATIO
OD_RATIO: 0.8
OS_RATIO: 0.9

## 2019-09-24 ASSESSMENT — VISUAL ACUITY
OD_CC: 20/150
OS_CC: NLP
METHOD: SNELLEN - LINEAR

## 2019-09-24 ASSESSMENT — EXTERNAL EXAM - LEFT EYE: OS_EXAM: NORMAL

## 2019-09-24 NOTE — NURSING NOTE
Recheck IOP with dilated exam and VF. Using drops as prescribed. Feeling scratchy pain and photophobia - wears sunglasses. Pain fluctuates day to day.     Dorzolamide 1 gtt BID Right eye   Brimonidine 1 gtt BID Right eye  Xalatan 1 gtt BID Both eyes    Ashley Synder 9:35 AM

## 2019-09-24 NOTE — NURSING NOTE
My Depression Action Plan  Name: Sonya Foote   Date of Birth 1950  Date: 9/24/2019    My doctor: Allan Casey   [facility]  [address]  [address]  [facility] [address]  [phone]          GREEN    ZONE   Good Control    What it looks like:     Things are going generally well. You have normal up s and down s. You may even feel depressed from time to time, but bad moods usually last less than a day.   What you need to do:  1. Continue to care for yourself (see self care plan)  2. Check your depression survival kit and update it as needed  3. Follow your physician s recommendations including any medication.  4. Do not stop taking medication unless you consult with your physician first.           YELLOW         ZONE Getting Worse    What it looks like:     Depression is starting to interfere with your life.     It may be hard to get out of bed; you may be starting to isolate yourself from others.    Symptoms of depression are starting to last most all day and this has happened for several days.     You may have suicidal thoughts but they are not constant.   What you need to do:     1. Call your care team, your response to treatment will improve if you keep your care team informed of your progress. Yellow periods are signs an adjustment may need to be made.     2. Continue your self-care, even if you have to fake it!    3. Talk to someone in your support network    4. Open up your depression survival kit           RED    ZONE Medical Alert - Get Help    What it looks like:     Depression is seriously interfering with your life.     You may experience these or other symptoms: You can t get out of bed most days, can t work or engage in other necessary activities, you have trouble taking care of basic hygiene, or basic responsibilities, thoughts of suicide or death that will not go away, self-injurious behavior.     What you need to  do:  1. Call your care team and request a same-day appointment. If they are not available (weekends or after hours) call your local crisis line, emergency room or 911.            Depression Self Care Plan / Survival Kit    Self-Care for Depression  Here s the deal. Your body and mind are really not as separate as most people think.  What you do and think affects how you feel and how you feel influences what you do and think. This means if you do things that people who feel good do, it will help you feel better.  Sometimes this is all it takes.  There is also a place for medication and therapy depending on how severe your depression is, so be sure to consult with your medical provider and/ or Behavioral Health Consultant if your symptoms are worsening or not improving.     In order to better manage my stress, I will:    Exercise  Get some form of exercise, every day. This will help reduce pain and release endorphins, the  feel good  chemicals in your brain. This is almost as good as taking antidepressants!  This is not the same as joining a gym and then never going! (they count on that by the way ) It can be as simple as just going for a walk or doing some gardening, anything that will get you moving.      Hygiene   Maintain good hygiene (Get out of bed in the morning, Make your bed, Brush your teeth, Take a shower, and Get dressed like you were going to work, even if you are unemployed).  If your clothes don't fit try to get ones that do.    Diet  I will strive to eat foods that are good for me, drink plenty of water, and avoid excessive sugar, caffeine, alcohol, and other mood-altering substances.  Some foods that are helpful in depression are: complex carbohydrates, B vitamins, flaxseed, fish or fish oil, fresh fruits and vegetables.    Psychotherapy  I agree to participate in Individual Therapy (if recommended).    Medication  If prescribed medications, I agree to take them.  Missing doses can result in serious  side effects.  I understand that drinking alcohol, or other illicit drug use, may cause potential side effects.  I will not stop my medication abruptly without first discussing it with my provider.    Staying Connected With Others  I will stay in touch with my friends, family members, and my primary care provider/team.    Use your imagination  Be creative.  We all have a creative side; it doesn t matter if it s oil painting, sand castles, or mud pies! This will also kick up the endorphins.    Witness Beauty  (AKA stop and smell the roses) Take a look outside, even in mid-winter. Notice colors, textures. Watch the squirrels and birds.     Service to others  Be of service to others.  There is always someone else in need.  By helping others we can  get out of ourselves  and remember the really important things.  This also provides opportunities for practicing all the other parts of the program.    Humor  Laugh and be silly!  Adjust your TV habits for less news and crime-drama and more comedy.    Control your stress  Try breathing deep, massage therapy, biofeedback, and meditation. Find time to relax each day.     My support system    Clinic Contact:  Phone number: [phone]   Contact 1:  Phone number:    Contact 2:  Phone number:    Sabianist/:  Phone number:    Therapist:  Phone number:    [facility] crisis center:    Phone number:    Other community support:  Phone number:

## 2019-09-24 NOTE — PATIENT INSTRUCTIONS
Patient will continue on Cosopt (Timolol/Dorzolamide) which is a blue top drop 2x/day (12 hours apart) in the RIGHT EYE ONLY, Latanoprost which is a teal top drop at bedtime in both eyes, and Alphagan (Brimonidine) which is a purple top drop 2x/day (12 hours apart) in the RIGHT EYE ONLY.  Patient will return to clinic in 4-6 months with evaluation, dilated eye exam, OCT with RNFL analysis and IOP check. Patient will also follow up with Manuela Mitchell.

## 2019-09-24 NOTE — PROGRESS NOTES
1)POAG/?LTG vs Glc Suspect -- s/p SLT OD (3/13/15), H/O negative head CT and MRI 2013,2014 for headaches, ?retinal vascular event in the right eye in the past.-- K pachy: 539/540   Tmax: OD:23    HVF: OD:[place] on LVC in 2016 (unable to transfer out of chair)     CDR: 0.7/0.9 (pallor OS>OD)    HRT/OCT: Severe RNFL thinning OS>OD       FHX of Glc: Father, grandparents -- lost vision, was on gtts     Gonio:       Intolerant to:      Asthma/COPD: Yes, on inhalers but tolerating topical and po BB   Steroid Use: No    Kidney Stones: No    Sulfa Allergy: No     IOP targets:  2)PCIOL OD -- vision loss in the right eye predates 2013 (pt believes she lost all vision in 2005) and she had several MRI's in 2013 including one with orbital sequences which did not reveal any structural change to cause a right optic neuropathy.  3)NS OS -- has had low vision eval with Manuela Mitchell  4)DM s   5)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  -- ?acute central retinal / ophthalmic artery thromboembolic event   6)H/O CVA  -- She is a severe vasculopath based on past medical history of several central nervous system strokes, coronary artery disease with CHF, severe hypertension, type II diabetes mellitus, and peripheral vascular disease.  7)COLLEEN    MD: pt moved back from [place] -- [facility] administers gtts    Patient will continue on Cosopt (Timolol/Dorzolamide) which is a blue top drop 2x/day (12 hours apart) in the RIGHT EYE ONLY, Latanoprost which is a teal top drop at bedtime in both eyes, and Alphagan (Brimonidine) which is a purple top drop 2x/day (12 hours apart) in the RIGHT EYE ONLY.  Patient will return to clinic in 4-6 months with evaluation, dilated eye exam, OCT with RNFL analysis and IOP check. Patient will also follow up with Manuela Mitchell.            Attending Physician Attestation:  Complete documentation of historical and exam elements from today's encounter can be found in the full encounter summary report (not reduplicated in this progress  note). I personally obtained the chief complaint(s) and history of present illness.  I confirmed and edited as necessary the review of systems, past medical/surgical history, family history, social history, and examination findings as documented by others; and I examined the patient myself. I personally reviewed the relevant tests, images, and reports as documented above. I formulated and edited as necessary the assessment and plan and discussed the findings and management plan with the patient and family.  - Marely Robin MD

## 2019-09-26 ENCOUNTER — DOCUMENTATION ONLY (OUTPATIENT)
Dept: SLEEP MEDICINE | Facility: CLINIC | Age: 69
End: 2019-09-26

## 2019-09-26 DIAGNOSIS — G47.33 OSA (OBSTRUCTIVE SLEEP APNEA): ICD-10-CM

## 2019-09-26 DIAGNOSIS — G47.39 COMPLEX SLEEP APNEA SYNDROME: ICD-10-CM

## 2019-09-26 NOTE — PROGRESS NOTES
14 DAY STM VISIT    Diagnostic AHI: 14 PSG      Subjective measures:   Pt reports that she has lost part of her mask.  She has been ill with a cold and allergies.  She has reached out to [facility]  Regarding a replacement piece for her mask    Assessment: Pt not meeting objective benchmarks for AHI, leak and compliance  Patient failing following subjective benchmarks: illness and now missing part of mask.     Action plan: pt to have 30 day STM visit.    Device type: CPAP ()    PAP settings:        CPAP fixed 12   cm  H20          Mask type:  Full Face Mask    Objective measures: 14 day rolling measures         Compliance  14 %     % of night spent in large leak  19 % last  upload      AHI 16.83   last  upload      Average number of minutes 129          Objective measure goal  Compliance   Goal >70%  Leak   Goal < 10%  AHI  Goal < 5  Usage  Goal >240      Total time spent on remote patient monitoring data analysis and patient contact today:   13  minutes

## 2019-10-09 ENCOUNTER — TELEPHONE (OUTPATIENT)
Dept: INTERNAL MEDICINE | Facility: CLINIC | Age: 69
End: 2019-10-09

## 2019-10-09 NOTE — TELEPHONE ENCOUNTER
Mahesh from nursing home calling. Patient complaining of burning with urination and foul and cloudy smelling urine. Asking for verbal orders for UA,UC. They use [facility] lab and would fax us the results. Please advise.

## 2019-10-09 NOTE — TELEPHONE ENCOUNTER
Informed. Also asked her to call us when results faxed so that can watch for them. Will leave encounter open, awaiting results. She will fax over UA results once available and then UC results once available.

## 2019-10-10 ENCOUNTER — TRANSFERRED RECORDS (OUTPATIENT)
Dept: HEALTH INFORMATION MANAGEMENT | Facility: CLINIC | Age: 69
End: 2019-10-10

## 2019-10-10 DIAGNOSIS — Z01.818 PREOP GENERAL PHYSICAL EXAM: Primary | ICD-10-CM

## 2019-10-10 DIAGNOSIS — R39.89 POSSIBLE URINARY TRACT INFECTION: ICD-10-CM

## 2019-10-10 DIAGNOSIS — N31.8 OTHER NEUROMUSCULAR DYSFUNCTION OF BLADDER: ICD-10-CM

## 2019-10-11 NOTE — TELEPHONE ENCOUNTER
Called Mahesh, she says she faxed UA results over yesterday. Nothing seen in PCPs folder. She now has UC results too. She is going to fax over UA and UC results now.

## 2019-10-11 NOTE — TELEPHONE ENCOUNTER
Please note I am not in clinic today and will not be clinic until Monday.  I cannot view urine culture results but I vaguely remember urinalysis results yesterday as being benign and an obvious UTI was not noted although I cannot recall results completely.  Suggest reviewing results by partner in clinic for potential need to start therapy as results are not available to me.

## 2019-10-14 ENCOUNTER — PRE VISIT (OUTPATIENT)
Dept: UROLOGY | Facility: CLINIC | Age: 69
End: 2019-10-14

## 2019-10-14 ENCOUNTER — DOCUMENTATION ONLY (OUTPATIENT)
Dept: SLEEP MEDICINE | Facility: CLINIC | Age: 69
End: 2019-10-14

## 2019-10-14 DIAGNOSIS — G47.33 OSA (OBSTRUCTIVE SLEEP APNEA): ICD-10-CM

## 2019-10-14 DIAGNOSIS — G47.39 COMPLEX SLEEP APNEA SYNDROME: ICD-10-CM

## 2019-10-14 NOTE — TELEPHONE ENCOUNTER
Chief Complaint : UDS-Dr. Hebert    Hx/Sx: Recurrent UTI/OAB    Records/Orders: Available    Pt Contacted: Letter on 10/14    At Rooming: SP tube in wheelchair and leaks urine; Iodine Allergy

## 2019-10-14 NOTE — PROGRESS NOTES
30 DAY STM VISIT    Diagnostic AHI: 14 PSG      Subjective measures:   Pt states that the mask keeps falling off her face. She says that she called [facility] and they are supposed to be sending her a new mask but she hasn't received anything yet.  She is putting it on every night but then the mask is coming apart from the tubing and the cushion keeps falling out of the mask. She will contact [facility] to see what is going on with getting her new mask.     Assessment: Pt not meeting objective benchmarks for AHI, leak and compliance  Patient failing following subjective benchmarks: leak issues   Action plan: schedule mask fit appointment and 2 week STM recheck appt scheduled  Patient has scheduled a follow up visit with Dr. Wright on 11/4/19  Device type: CPAP  PAP settings: CPAP fixed 12 cm  H20    Mask type:  Full Face Mask    Objective measures: 14 day rolling measures         Compliance  7 %     % of night spent in large leak  32 % last  upload      AHI 17.82   last  upload      Average number of minutes 106          Objective measure goal  Compliance   Goal >70%  Leak   Goal < 10%  AHI  Goal < 5  Usage  Goal >240      Total time spent on remote patient monitoring data analysis and patient contact today:   15 minutes

## 2019-10-18 NOTE — PROGRESS NOTES
Endocrinology Note         Sonya is a 68 year old female presents today for type 2 diabetes    HPI  Sonya Foote is a 68 years old female who has extensive medical history including type 2 diabetes with neuropathy, s/p bilateral above knee amputation, stroke x3, coronary artery disease s/p stents, CHF, left carotid artery stenosis s/p stent, peripheral artery disease s/p stents and frequent fall who is here today to follow up type 2 diabetes.     She has been diagnosed with type 2 diabetes for 10 years. Prior to that, she was prediabetes for many years.     Interval history:  Last visit with me was in 11/2017. She did have atypical chest pain secondary to hypertension admitted observation 2 days ago.   She is now wheelchair bound but working with physical therapist for transferring her place to place. She is currently living at assisted living and the nurse prepared all medication and checked blood glucose for her.    1) type 2 diabetes with micro and macrovascular complications: A1C on 5/2/0/208 was 4.4 (Hb of 11.4). She does not bring glucose meter today but reports BG in 160 ranges without hypoglycemia. She is currently living at assisted living. She said that she likes to have eat pasta, pizza and lasanga.    DM complications:  Retinopathy: right eye - glaucoma, left eye partial blindness   Nephropathy: Cr 0.48 (5/3/2018), microalbumin normal (2/2016)  Neuropathy: numbness and tingling bilateral feet and hands - on gabapentin  On ASA and plavix  On atorvastatin 40 mg, LDL 77 (6/6/16)    2) Osteoporosis. DXA in 11/2012 showed T-score of -2.6 (BMD 0.684) at left femoral neck. DXA in 7/2015 showed lumbar spine (L1-L4) T-score: -2.4 degenerative and/or osteosclerotic changes are present, falsely improving result and left femoral neck T-score was -1.9, right femoral neck T-score was -2.2. She was on alendronate 70 mg week from 2012 to mid 2017. She takes 2000 IU of vitamin D3. She has cheese regularly.     Past  Spoke to pt and gave results. Pt understood, and states the cream that was recommended is working for him.   Medical History  Past Medical History:   Diagnosis Date     Anemia      Antiplatelet or antithrombotic long-term use      Arthritis      AS (sickle cell trait) (H) 10/8/2013     Blind left eye      BPPV (benign paroxysmal positional vertigo)      Chronic back pain      COPD (chronic obstructive pulmonary disease) (H)      Coronary artery disease      CVA (cerebral infarction) 10/8/2012    x3, residual left sided weakness     Diastolic CHF (H) 9/4/2012     Dilantin toxicity 5/31/2013     Esophageal candidiasis (H)      GERD (gastroesophageal reflux disease)      Heart attack      Hernia, abdominal      History of blood transfusion     x5     History of thrombophlebitis      HTN (hypertension) 9/4/2012     Hyperlipidemia LDL goal <100 9/4/2012     Legally blind in right eye, as defined in [place]     No periphal vision right eye     Osteoporosis 1/21/2013     Other chronic pain      PAD (peripheral artery disease) (H)      Palpitations      Person who has had sex change operation 1/20/2014     Pneumonia      Schizoaffective disorder (H)      Seizure disorder (H) 9/4/2012     Sleep apnea     CPAP     Thrombosis of leg      Type 2 diabetes, HbA1C goal < 8% (H) 9/4/2012     Uncomplicated asthma      Unspecified cerebral artery occlusion with cerebral infarction    - hx of UGI bleeding secondary to severe esophagitis and gastric ulcer  - Right common femoral artery, SFA, and popliteal artery balloon athrectomy and angioplasty and right SFA stent placement 9/14/15.  - bilateral above knee amputation    Allergies  Allergies   Allergen Reactions     Bee Venom      Penicillins Anaphylaxis     Other reaction(s): Skin Rash and/or Hives     Dilantin [Phenytoin] Other (See Comments)     Generic dilantin only per pt     Iodide Hives     09/11/15: Pt states she can use iodine and doesn't have any problems      Iodine-131      Novocaine [Procaine] Hives     Other reaction(s): Skin Rash and/or Hives     Tositumomab       Medications  Current Outpatient Prescriptions   Medication Sig Dispense Refill     ACETAMINOPHEN PO Take 1,000 mg by mouth every 4 hours as needed for fever Not to exceed 4000 mg/day       ADVAIR DISKUS 250-50 MCG/DOSE diskus inhaler Inhale 1 puff into the lungs 2 times daily 60 Inhaler 11     albuterol (2.5 MG/3ML) 0.083% neb solution INHALE 1 VIAL VIA NEBULIZER EVERY 6 HOURS AS NEEDED 360 mL 11     aspirin EC 81 MG EC tablet Take 1 tablet (81 mg) by mouth daily 90 tablet 3     atorvastatin (LIPITOR) 40 MG tablet Take 1 tablet (40 mg) by mouth daily 90 tablet 2     blood glucose monitoring (ONE TOUCH ULTRA 2) meter device kit Use to test blood sugars 3 times daily or as directed. 1 kit 0     blood glucose monitoring (ONE TOUCH ULTRA) test strip Use to test blood sugars 3 times daily or as directed. 3 Box 3     blood glucose monitoring (ONE TOUCH ULTRASOFT) lancets Use to test blood sugar 3 times daily or as directed. 100 each PRN     brimonidine (ALPHAGAN) 0.2 % ophthalmic solution Place 1 drop into the right eye 2 times daily 1 Bottle 11     calcium citrate-vitamin D (CITRACAL) 315-250 MG-UNIT TABS Take 2 tablets by mouth daily 120 tablet 5     cetirizine (ZYRTEC) 10 MG tablet Take 1 tablet (10 mg) by mouth daily as needed for allergies 90 tablet 3     Cholecalciferol (VITAMIN D) 2000 UNITS tablet Take 2,000 Units by mouth daily. 100 tablet 3     CYANOCOBALAMIN PO Take 2,000 mcg by mouth daily       diclofenac (VOLTAREN) 1 % GEL topical gel Apply 2 g topically 4 times daily to hands 100 g 11     dorzolamide (TRUSOPT) 2 % ophthalmic solution Place 1 drop into the right eye 2 times daily 1 Bottle 11     EPINEPHrine (EPIPEN/ADRENACLICK/OR ANY BX GENERIC EQUIV) 0.3 MG/0.3ML injection 2-pack Inject 0.3 mLs (0.3 mg) into the muscle as needed for anaphylaxis 0.6 mL 1     escitalopram (LEXAPRO) 10 MG tablet Take 10 mg by mouth daily        ESTRACE VAGINAL 0.1 MG/GM cream USE DIME SIZE AMOUNT 3X A WEEK AT NIGHT 42.5 g  11     estradiol (ESTRACE) 1 MG tablet Take 1 tablet (1 mg) by mouth daily 90 tablet 3     ferrous sulfate (IRON) 325 (65 FE) MG tablet Take 1 tablet (325 mg) by mouth 2 times daily With meals 60 tablet 2     fluticasone (FLONASE) 50 MCG/ACT nasal spray Spray 1 spray into both nostrils daily       guaiFENesin-codeine (ROBITUSSIN AC) 100-10 MG/5ML SOLN solution Take 5 mLs by mouth every 4 hours as needed for cough 120 mL 0     hydrochlorothiazide (MICROZIDE) 12.5 MG capsule Take 1 capsule (12.5 mg) by mouth daily 90 capsule 2     lactulose (CHRONULAC) 10 GM/15ML solution Take 20 g by mouth as needed for constipation       latanoprost (XALATAN) 0.005 % ophthalmic solution Place 1 drop into both eyes At Bedtime 2.5 mL 11     levETIRAcetam (KEPPRA) 500 MG tablet Take 1 tablet (500 mg) by mouth 2 times daily 180 tablet 1     lidocaine (LIDODERM) 5 % Patch APPLY 1 TO 3 PATCHES TO PAINFUL AREA AT ONCE FOR UP TO 12 HOURS WITHIN A 24 HOUR PERIOD REMOVE AFTER 12 HOURS 30 patch 5     lidocaine patch REMOVAL Place onto the skin At Bedtime       lisinopril (PRINIVIL/ZESTRIL) 20 MG tablet Take 1 tablet (20 mg) by mouth daily 90 tablet 0     MEDICATION GIVEN BY INTRATHECAL PUMP - INSTRUCTION continuous 2/8/18: Pump managed by [facility] in [place] [phone].   Conc: Bupivacaine 20 mg/mL and Fentanyl 2000 mcg/mL, morphine 2 mg/mL  Continuous:  Fentanyl 796 mcg/day, Bupivacaine 7.96 mg/day, Morphine 0.796 mg/day   Boluses: Up to 7 boluses per 24-hr period of Bupivicaine 0.5994 mg and Fentanyl 59.9 mcg morphine 0.0599 mg.  Pump Refill Date 02/28/18       metFORMIN (GLUCOPHAGE-XR) 500 MG 24 hr tablet Take 1 tablet (500 mg) by mouth daily (with dinner) 90 tablet 3     metoprolol succinate (TOPROL-XL) 50 MG 24 hr tablet Take 1 tablet (50 mg) by mouth daily 90 tablet 0     Multiple Vitamin (MULTIVITAMIN OR) Take 1 tablet by mouth daily        nicotine (NICODERM CQ) 14 MG/24HR 24 hr patch Place 1 patch onto  the skin daily 30 patch 0     nitroglycerin (NITROSTAT) 0.4 MG SL tablet Place 1 tablet (0.4 mg) under the tongue every 5 minutes as needed for chest pain if you are still having symptoms after 3 doses (15 minutes) call 911. 25 tablet 1     ondansetron (ZOFRAN-ODT) 8 MG ODT tab Take 1 tablet (8 mg) by mouth every 8 hours as needed 30 tablet 5     ORDER FOR DME Equipment being ordered: Depends briefs 1 Month 11     ORDER FOR DME Equipment being ordered: Power Wheelchair 1 Device 0     order for DME Equipment being ordered: Glucerna daily shakes with each meal 1 Box 11     order for DME Equipment being ordered: Nebulizer and tubing supplies 1 Units 0     order for DME Equipment being ordered: CPAP supplies mask, hose, filters, cushion    fax to [facility] at [phone] 10 Device prn     order for DME Equipment being ordered: CPAP supplies mask, hose, filters, cushion fax to [facility] at [phone]  Disp: 10 Device Refills: prn   Class: Local Print Start: 2/10/2017 1 Device 0     order for DME Hospital bed with side rails 1 Device 0     order for DME Full electric hospital bed with half rails    Dx: T95108, I110, J449  Length of need: lifetime 1 Device 0     order for DME Wheel Chair Cushion: 18 x 18 inch Roho cushion 1 Device 0     order for DME Equipment being ordered: bandage tape, prefer paper 1 Package 0     pantoprazole (PROTONIX) 40 MG EC tablet Take 1 tablet (40 mg) by mouth daily 30 tablet 5     phenytoin 200 MG CAPS Take 200 mg by mouth 2 times daily (Patient taking differently: Take 200 mg by mouth 2 times daily (takes at 8 AM and 8 PM)) 60 capsule 0     polyethylene glycol (MIRALAX/GLYCOLAX) Packet Take 17 g by mouth daily Dissolved in water or juice        pregabalin (LYRICA) 75 MG capsule Take 2 capsules (150 mg) by mouth 2 times daily 120 capsule 5     PROCHLORPERAZINE MALEATE PO Take 10 mg by mouth every 8 hours as needed for nausea or vomiting       risperiDONE (RISPERDAL) 0.5 MG tablet  Take 0.5 mg by mouth At Bedtime       silver sulfADIAZINE (SILVADENE) 1 % cream Apply topically 2 times daily 50 g 0     sucralfate (CARAFATE) 1 GM tablet Take 1 tablet (1 g) by mouth 4 times daily May dissolve in 10 mL water is necessary. (Start upon completion of carafate suspension) 120 tablet 11     traZODone (DESYREL) 50 MG tablet Take 150 mg by mouth At Bedtime        umeclidinium (INCRUSE ELLIPTA) 62.5 MCG/INH oral inhaler Inhale 1 puff into the lungs daily       VENTOLIN  (90 BASE) MCG/ACT Inhaler Inhale 2 puffs into the lungs every 6 hours as needed for shortness of breath / dyspnea or wheezing 3 Inhaler 5     zinc 50 MG TABS Take 1 tablet by mouth daily       Family History reviwed  Strong family history of type 2 diabetes in both parents, grandparents, cousin and sister  Stroke and heart disease in her parents    Social History - reviewed  Social History   Substance Use Topics     Smoking status: Former Smoker     Packs/day: 2.50     Years: 30.00     Types: Cigarettes, Cigars     Quit date: 11/1/2001     Smokeless tobacco: Never Used     Alcohol use No     Now on disability  Stay at assisted living facility with her , has home care nurse visit once a week    ROS  Constitutional: stable weight, +fatigue  Eyes: partial blindness right and left eyes, poor eyes sight, could not see well particularly at night.  Cardiovascular: no chest pain, palpitations  Respiratory: no dyspnea, cough, shortness of breath  GI: no nausea, vomiting, intermittent diarrhea, no constipation, no abdominal pain   : no change in urine, no dysuria or hematuria  Musculoskeletal: tingling and numbness in her feet and hands  Integumentary: no concerning lesions   Neuro: +numbness in both feet and hands, no tremor, no dizziness  Endo: +sensitive to both hot and cold weather   Heme/Lymph: +UGI bleeding from severe esophagitis and gastritis 9/17/16  Allergy: no environmental allergies   Psych: could not sleep well due to  feet pain, and back pain    Physical Exam  /80  Pulse 71  There is no height or weight on file to calculate BMI.  Constitutional: no distress, comfortable, pleasant   Eyes: anicteric  Musculoskeletal: no edema, bilateral AKA amputation  Skin: no jaundice   Neurological: no tremor, in the motorized wheelchair due to leg and back pain  Psychological: appropriate mood       RESULTS  A1c 4.4 5/2/2018  A1C 5.1 11/10/17  A1C 4.8 5/12/17  A1C 5.0 9/30/16  A1C      4.9   6/6/2016  A1C 5.2 2/5/2016  A1C  4.8  8/11/2015  A1C  5.1 6/30/15  A1C      5.3  1/20/15  A1C      4.8  10/21/14  A1C      5.2   4/1/2014  A1C      5.4   11/9/2013  A1C      5.2   10/29/2013  A1C      4.7   5/17/2013  A1C      4.9   3/13/2013    ENDO DIABETES Latest Ref Rng 2/5/2016   MICROALBUMIN URINE  6   MICROALBUMIN MG/G CR 0 - 25 mg/g Cr 10.72     ENDO DIABETES Latest Ref Rng 6/6/2016   CHOLESTEROL <200 mg/dL 155   LDL CHOLESTEROL, CALCULATED <100 mg/dL 77   HDL CHOLESTEROL >49 mg/dL 65   VLDL-CHOLESTEROL 0 - 30 mg/dL    NON HDL CHOLESTEROL <130 mg/dL 89   TRIGLYCERIDES <150 mg/dL 62     DXA 7/3/15  Lumbar Spine (L1-L4) T-score: -2.4 Degenerative and/or osteosclerotic changes are present, falsely improving result.   Left Femoral Neck T-score: -1.9  Right Femoral Neck T-score: -2.2    Lumbar (L1-L4) BMD: 0.900 Previous: 0.948   Total Hip Mean BMD: 0.739 Previous: 0.684    Patient has had a previous DXA performed on a different Lunar machine on 11/12. Unfortunately, method for data collection at that time is not adequate for comparison.    IMPRESSION  Osteoporosis (based on T-score and degenerative changes)  Degenerative changes of the spine    ASSESSMENT:    Sonya Foote is a 68 years old female who has multiple medical history mainly type 2 diabetes, strokex3, coronary artery disease, left carotid artery stenosis s/p stent, peripheral artery disease s/p stent and bilateral AKA amputation and frequent fall. She is here today to follow up type 2  diabetes.    1) type 2 diabetes with peripheral neuropathy and macrovascular complications (PVD, CAD, CVA):  A1C today is 4.4% with Hb 11.4.  - continue metformin  mg with dinner.    2) osteoporosis: diagnosed based on DXA in 2012, T-score of -2.6 at left femoral neck. She has been on alendronate 70 mg weekly from 2012 to mid 2017. Repeated DXA 7/2015 showed improving of hip BMD.   - re-evaluate with DXA this year.  - continue with calcium and vitamin D intake.    3) hx of stroke, CAD, peripheral artery disease s/p stent and left AKA    4) hx of upper GI bleeding due to esophagitis and gastritis    5) uncontrolled hypertension: recommend to reduce salt.    PLAN:   - continue metfomrin  mg daily  - continue with diet modification   - advise on cutting down high carbohydrate diet   - continue to check blood glucose 1-2 times per day  - recommend for DXA this year -- deferred PCP  - she can follow up with PCP for diabetes and bone density  - pt will move to [place], [place] in 2 months    Michelle Irizarry MD  Endocrinology  [phone]    CC: Dr.Mark Casey

## 2019-10-23 ENCOUNTER — OFFICE VISIT (OUTPATIENT)
Dept: INTERNAL MEDICINE | Facility: CLINIC | Age: 69
End: 2019-10-23
Payer: COMMERCIAL

## 2019-10-23 VITALS
WEIGHT: 130 LBS | BODY MASS INDEX: 49.43 KG/M2 | OXYGEN SATURATION: 97 % | TEMPERATURE: 98.9 F | DIASTOLIC BLOOD PRESSURE: 50 MMHG | HEART RATE: 83 BPM | RESPIRATION RATE: 16 BRPM | SYSTOLIC BLOOD PRESSURE: 96 MMHG

## 2019-10-23 DIAGNOSIS — Z89.511 STATUS POST BELOW KNEE AMPUTATION OF RIGHT LOWER EXTREMITY (H): ICD-10-CM

## 2019-10-23 DIAGNOSIS — I69.359 CVA, OLD, HEMIPARESIS (H): ICD-10-CM

## 2019-10-23 DIAGNOSIS — Z01.818 PREOP GENERAL PHYSICAL EXAM: Primary | ICD-10-CM

## 2019-10-23 DIAGNOSIS — I73.9 PAD (PERIPHERAL ARTERY DISEASE) (H): ICD-10-CM

## 2019-10-23 DIAGNOSIS — I50.22 CHRONIC SYSTOLIC CONGESTIVE HEART FAILURE (H): ICD-10-CM

## 2019-10-23 DIAGNOSIS — G89.4 CHRONIC PAIN SYNDROME: ICD-10-CM

## 2019-10-23 DIAGNOSIS — G40.909 SEIZURE DISORDER (H): ICD-10-CM

## 2019-10-23 DIAGNOSIS — N39.9 URINARY DISORDER: ICD-10-CM

## 2019-10-23 DIAGNOSIS — E11.51 TYPE 2 DIABETES MELLITUS WITH DIABETIC PERIPHERAL ANGIOPATHY WITHOUT GANGRENE, WITHOUT LONG-TERM CURRENT USE OF INSULIN (H): ICD-10-CM

## 2019-10-23 DIAGNOSIS — J41.0 SIMPLE CHRONIC BRONCHITIS (H): ICD-10-CM

## 2019-10-23 DIAGNOSIS — I25.5 ISCHEMIC CARDIOMYOPATHY: ICD-10-CM

## 2019-10-23 LAB
GLUCOSE SERPL-MCNC: 78 MG/DL (ref 70–99)
HGB BLD-MCNC: 11.5 G/DL (ref 11.7–15.7)
PLATELET # BLD AUTO: 301 10E9/L (ref 150–450)
POTASSIUM SERPL-SCNC: 4.2 MMOL/L (ref 3.4–5.3)

## 2019-10-23 PROCEDURE — 36415 COLL VENOUS BLD VENIPUNCTURE: CPT | Performed by: INTERNAL MEDICINE

## 2019-10-23 PROCEDURE — 93000 ELECTROCARDIOGRAM COMPLETE: CPT | Performed by: INTERNAL MEDICINE

## 2019-10-23 PROCEDURE — 85018 HEMOGLOBIN: CPT | Performed by: INTERNAL MEDICINE

## 2019-10-23 PROCEDURE — 85049 AUTOMATED PLATELET COUNT: CPT | Performed by: INTERNAL MEDICINE

## 2019-10-23 PROCEDURE — 82947 ASSAY GLUCOSE BLOOD QUANT: CPT | Performed by: INTERNAL MEDICINE

## 2019-10-23 PROCEDURE — 84132 ASSAY OF SERUM POTASSIUM: CPT | Performed by: INTERNAL MEDICINE

## 2019-10-23 PROCEDURE — 99215 OFFICE O/P EST HI 40 MIN: CPT | Performed by: INTERNAL MEDICINE

## 2019-10-23 NOTE — PROGRESS NOTES
[facility]  [address] [address]  [phone]  Dept: [phone]    PRE-OP EVALUATION:  Today's date: 10/23/2019    Sonya Foote (: 1950) presents for pre-operative evaluation assessment as requested by Dr. Jauregui.  She requires evaluation and anesthesia risk assessment prior to undergoing surgery/procedure for treatment of neuromuscular dysfunction of bladder .    Proposed Surgery/ Procedure: Cystoscopy   Fax number:[phone]  Date of Surgery/ Procedure: 10/30/19  Time of Surgery/ Procedure: 10:35 am   Hospital/Surgical Facility: [facility]   Primary Physician: Allan Casey  Type of Anesthesia Anticipated: Choice    Patient has a Health Care Directive or Living Will:  YES     HPI:     HPI related to upcoming procedure:She requires evaluation and anesthesia risk assessment prior to undergoing surgery/procedure for treatment of neuromuscular dysfunction of bladder .    Proposed Surgery/ Procedure: Cystoscopy   Date of Surgery/ Procedure: 10/30/19     MEDICAL HISTORY:     Patient Active Problem List    Diagnosis Date Noted     Other neuromuscular dysfunction of bladder 2019     Priority: Medium     Added automatically from request for surgery [id number]       Bladder spasm 2019     Priority: Medium     S/P AKA (above knee amputation) bilateral (H) 2019     Priority: Medium     Chronic, continuous use of opioids 2019     Priority: Medium     Tobacco abuse 2018     Priority: Medium     CVA, old, hemiparesis (H) 2018     Priority: Medium     Other migraine without status migrainosus, not intractable 2018     Priority: Medium     Incontinence 2018     Priority: Medium     Hyperlipidemia LDL goal <70 2017     Priority: Medium     Functional incontinence 2017     Priority: Medium     Personal history of urinary tract infection 2017     Priority: Medium     Bee sting reaction,  undetermined intent, subsequent encounter 04/06/2017     Priority: Medium     Coronary artery disease of native artery of native heart with stable angina pectoris (H) 04/04/2017     Priority: Medium     Ischemic cardiomyopathy 04/04/2017     Priority: Medium     Complex sleep apnea syndrome 03/15/2017     Priority: Medium     JOSE (obstructive sleep apnea) 02/22/2017     Priority: Medium     10/26/2012 - Polysomnography at [facility] -  min; AHI 14; RDI 22; ERIN 5/hr; No PLMs or RBD.  11/16/2012 - Titration Polysomnography at [facility] - Started on CPAP and developed treatment-emergent central apneas.  Adaptive Servo-Ventilation titration optimal but did well on CPAP 8 as well. Put on CPAP 8 cmH2O.    Has systolic CHF, intrathecal narcotic pump, and treatment-emergent Central Sleep Apnea on CPAP.  Titration Study:  Sleep Study 2/26/2017 - (130.0 lbs) CPAP was titrated to a pressure of 12 with an AHI of 38.5.  Time Spent in REM supine at this pressure was 7.0       Primary open angle glaucoma of both eyes, severe stage 12/08/2016     Priority: Medium     Pseudophakia of right eye 12/08/2016     Priority: Medium     Cataract, left eye 12/08/2016     Priority: Medium     Diabetes mellitus type 2 without retinopathy (H) 12/08/2016     Priority: Medium     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 (H) 11/23/2016     Priority: Medium     Critical lower limb ischemia 11/07/2016     Priority: Medium     Anemia, unspecified type 10/22/2016     Priority: Medium     Type 2 diabetes mellitus with diabetic peripheral angiopathy without gangrene, without long-term current use of insulin (H) 10/12/2016     Priority: Medium     Essential hypertension 09/02/2016     Priority: Medium     Dysphagia 08/09/2016     Priority: Medium     Atherosclerotic peripheral vascular disease with rest pain (H) 06/06/2016     Priority: Medium     Osteoarthritis 05/19/2016     Priority: Medium     Pain in both upper extremities 05/19/2016      Priority: Medium     Stenosis of carotid artery 04/01/2016     Priority: Medium     Chronic systolic congestive heart failure (H) 03/08/2016     Priority: Medium     PAD (peripheral artery disease) (H) 09/14/2015     Priority: Medium     Cervicalgia 01/15/2015     Priority: Medium     GI bleed 08/21/2014     Priority: Medium     Intestinal malabsorption 08/06/2014     Priority: Medium     updating diagnosis code for icd10 cutover       Claudication in peripheral vascular disease (H) 04/22/2014     Priority: Medium     Person who has had sex change operation 01/20/2014     Priority: Medium     Vertigo 11/08/2013     Priority: Medium     AS (sickle cell trait) (H) 10/08/2013     Priority: Medium     Schizoaffective disorder (H) 06/07/2013     Priority: Medium     Osteoporosis 01/21/2013     Priority: Medium     Simple chronic bronchitis (H) 01/01/2013     Priority: Medium     Seizure disorder (H) 09/04/2012     Priority: Medium     Adjustment disorder with depressed mood 09/16/2009     Priority: Medium     Central retinal artery occlusion 08/19/2009     Priority: Medium     Anxiety disorder due to general medical condition 07/29/2009     Priority: Medium     Hx of colonic polyp 07/13/2009     Priority: Medium     Overview:   Colonoscopy completed on July 2009.  See report for full detail.  Please re refer in 5 years for repeat colon cancer screening if medically appropriate.  Need colyte 4/2 preparation.  Overview:   Colonoscopy completed on July 2009.  See report for full detail.  Please re refer in 5 years for repeat colon cancer screening if medically appropriate.  Need colyte 4/2 preparation.       Hereditary and idiopathic peripheral neuropathy 07/10/2009     Priority: Medium     Peripheral neuropathy 07/10/2009     Priority: Medium     Androgen insensitivity syndrome 03/23/2009     Priority: Medium     Testicular feminization 03/23/2009     Priority: Medium     Chronic pain syndrome 03/20/2009     Priority:  Medium     Patient is followed by Allan Casey for ongoing prescription of pain meds  All refills should be approved by this provider, or covering partner.    Maximum quantity per month: 1 month  Clinic visit frequency required: Q 6  months     Controlled substance agreement:  Encounter-Level CSA:     There are no encounter-level csa.        Pain Clinic evaluation in the past: No    DIRE Total Score(s):  No flowsheet data found.    Last [facility] website verification:  6/6/18   [email or web address]       Thoracic or lumbosacral neuritis or radiculitis 03/20/2009     Priority: Medium     Lumbosacral radiculitis 03/20/2009     Priority: Medium     Disorder of bone and cartilage 05/24/2007     Priority: Medium     Late effect of stroke 07/13/2006     Priority: Medium     Cerebral infarction due to occlusion or stenosis of carotid artery 02/06/2006     Priority: Medium     Problem list name updated by automated process. Provider to review       Iron deficiency anemia 11/18/2005     Priority: Medium     Degeneration of intervertebral disc of lumbosacral region 11/18/2005     Priority: Medium     Overview:   with radiculopathy  Overview:   with radiculopathy       History of myocardial infarction 08/02/2005     Priority: Medium     Open-angle glaucoma 08/11/2003     Priority: Medium     Asthma 04/04/2003     Priority: Medium      Past Medical History:   Diagnosis Date     Anemia      Antiplatelet or antithrombotic long-term use      Arthritis      AS (sickle cell trait) (H) 10/8/2013     Blind left eye      BPPV (benign paroxysmal positional vertigo)      Chronic back pain      COPD (chronic obstructive pulmonary disease) (H)      Coronary artery disease      CVA (cerebral infarction) 10/8/2012    x3, residual left sided weakness     Diastolic CHF (H) 9/4/2012     Dilantin toxicity 5/31/2013     Esophageal candidiasis (H)      GERD (gastroesophageal reflux disease)      Heart attack (H)      Hernia, abdominal       History of blood transfusion     x5     History of thrombophlebitis      HTN (hypertension) 9/4/2012     Hyperlipidemia LDL goal <100 9/4/2012     Legally blind in right eye, as defined in [place]     No periphal vision right eye     Osteoporosis 1/21/2013     Other chronic pain      PAD (peripheral artery disease) (H)      Palpitations      Person who has had sex change operation 1/20/2014     Pneumonia      Schizoaffective disorder (H)      Seizure disorder (H) 9/4/2012     Sleep apnea     CPAP     Thrombosis of leg      Type 2 diabetes, HbA1C goal < 8% (H) 9/4/2012     Uncomplicated asthma      Unspecified cerebral artery occlusion with cerebral infarction      Past Surgical History:   Procedure Laterality Date     AMPUTATE LEG ABOVE KNEE Left 6/11/2016    Procedure: AMPUTATE LEG ABOVE KNEE;  Surgeon: Mello Rodriguez MD;  [facility]     AMPUTATE LEG BELOW KNEE Right 11/7/2016    Procedure: AMPUTATE LEG BELOW KNEE;  Surgeon: Savannah Durant MD;  [facility]     AMPUTATE REVISION STUMP LOWER EXTREMITY Right 11/11/2016    Procedure: AMPUTATE REVISION STUMP LOWER EXTREMITY;  Surgeon: Savannah Durant MD;  [facility]     AMPUTATE REVISION STUMP LOWER EXTREMITY Right 11/16/2016    Procedure: AMPUTATE REVISION STUMP LOWER EXTREMITY;  Surgeon: Savannah Durant MD;  [facility]     AMPUTATE TOE(S) Right 1/5/2016    Procedure: AMPUTATE TOE(S);  Surgeon: Mello Gaines DPM;  Location:  [facility]     ANGIOGRAM Bilateral 11/21/2014    Procedure: ANGIOGRAM;  Surgeon: Savannah Durant MD;  [facility]     ANGIOGRAM Left 1/16/2015    Procedure: ANGIOGRAM;  Surgeon: Savannah Durant MD;  [facility]     ANGIOGRAM Bilateral 9/14/2015    Procedure: ANGIOGRAM;  Surgeon: Savannah Durant MD;  [facility]     ANGIOGRAM Left 10/12/2015    Procedure: ANGIOGRAM;  Surgeon: Savannah Durant MD;  [facility]     ANGIOGRAM Right 6/6/2016    Procedure: ANGIOGRAM;  Surgeon: Savannah Durant MD;  [facility]     ANGIOPLASTY Right  6/6/2016    Procedure: ANGIOPLASTY;  Surgeon: Savannah Durant MD;  [facility]     APPENDECTOMY       BREAST SURGERY      right breast bx (benign)     CATARACT IOL, RT/LT Right      CHOLECYSTECTOMY       COLONOSCOPY N/A 8/25/2014    Procedure: COLONOSCOPY;  Surgeon: Mello Ferrer MD;  [facility]     COLONOSCOPY WITH CO2 INSUFFLATION N/A 8/20/2014    Procedure: COLONOSCOPY WITH CO2 INSUFFLATION;  Surgeon: Duane, William Charles, MD;  [facility]     CYSTOSTOMY, INSERT TUBE SUPRAPUBIC, COMBINED N/A 1/16/2018    Procedure: COMBINED CYSTOSTOMY, INSERT TUBE SUPRAPUBIC;  Cystoscopy, Intraoperative Ultrasound, Suprapubic Tube Placement;  Surgeon: Keanu Dawson MD;  [facility]     ENDARTERECTOMY FEMORAL  5/23/2014    Procedure: ENDARTERECTOMY FEMORAL;  Surgeon: Jason Joshi MD;  [facility]     ESOPHAGOSCOPY, GASTROSCOPY, DUODENOSCOPY (EGD), COMBINED  12/14/2012    Procedure: COMBINED ESOPHAGOSCOPY, GASTROSCOPY, DUODENOSCOPY (EGD), BIOPSY SINGLE OR MULTIPLE;  ESOPHAGOSCOPY, GASTROSCOPY, DUODENOSCOPY (EGD), DILATATION ;  Surgeon: Elizabeth Stevenson MD;  Location:  GI     ESOPHAGOSCOPY, GASTROSCOPY, DUODENOSCOPY (EGD), COMBINED  12/31/2013    Procedure: COMBINED ESOPHAGOSCOPY, GASTROSCOPY, DUODENOSCOPY (EGD), BIOPSY SINGLE OR MULTIPLE;;  Surgeon: Clemente Lopez MD;  [facility]     ESOPHAGOSCOPY, GASTROSCOPY, DUODENOSCOPY (EGD), COMBINED  4/1/2014    Procedure: COMBINED ESOPHAGOSCOPY, GASTROSCOPY, DUODENOSCOPY (EGD);;  Surgeon: Clemente Lopez MD;  [facility]     ESOPHAGOSCOPY, GASTROSCOPY, DUODENOSCOPY (EGD), COMBINED  6/28/2014    Procedure: COMBINED ESOPHAGOSCOPY, GASTROSCOPY, DUODENOSCOPY (EGD);  Surgeon: Clemente Lopez MD;  [facility]     ESOPHAGOSCOPY, GASTROSCOPY, DUODENOSCOPY (EGD), COMBINED N/A 8/20/2014    Procedure: COMBINED ESOPHAGOSCOPY, GASTROSCOPY, DUODENOSCOPY (EGD), BIOPSY SINGLE OR MULTIPLE;  Surgeon: Duane, William Charles, MD;  [facility]      ESOPHAGOSCOPY, GASTROSCOPY, DUODENOSCOPY (EGD), COMBINED N/A 8/22/2014    Procedure: COMBINED ESOPHAGOSCOPY, GASTROSCOPY, DUODENOSCOPY (EGD), BIOPSY SINGLE OR MULTIPLE;  Surgeon: Mello Ferrer MD;  [facility]     ESOPHAGOSCOPY, GASTROSCOPY, DUODENOSCOPY (EGD), COMBINED N/A 10/2/2014    Procedure: COMBINED ESOPHAGOSCOPY, GASTROSCOPY, DUODENOSCOPY (EGD), BIOPSY SINGLE OR MULTIPLE;  Surgeon: Remy Haskins MD;  [facility]     ESOPHAGOSCOPY, GASTROSCOPY, DUODENOSCOPY (EGD), COMBINED Left 12/15/2014    Procedure: COMBINED ESOPHAGOSCOPY, GASTROSCOPY, DUODENOSCOPY (EGD), BIOPSY SINGLE OR MULTIPLE;  Surgeon: Remy Haskins MD;  [facility]     ESOPHAGOSCOPY, GASTROSCOPY, DUODENOSCOPY (EGD), COMBINED N/A 2/25/2015    Procedure: COMBINED ENDOSCOPIC ULTRASOUND, ESOPHAGOSCOPY, GASTROSCOPY, DUODENOSCOPY (EGD), FINE NEEDLE ASPIRATE/BIOPSY;  Surgeon: Clemente Lugo MD;  [facility]     ESOPHAGOSCOPY, GASTROSCOPY, DUODENOSCOPY (EGD), COMBINED Left 2/25/2015    Procedure: COMBINED ESOPHAGOSCOPY, GASTROSCOPY, DUODENOSCOPY (EGD), BIOPSY SINGLE OR MULTIPLE;  Surgeon: Clemente Lugo MD;  [facility]     ESOPHAGOSCOPY, GASTROSCOPY, DUODENOSCOPY (EGD), COMBINED N/A 9/25/2016    Procedure: COMBINED ESOPHAGOSCOPY, GASTROSCOPY, DUODENOSCOPY (EGD);  Surgeon: Aziza Patiño MD;  [facility]     ESOPHAGOSCOPY, GASTROSCOPY, DUODENOSCOPY (EGD), COMBINED N/A 1/18/2017    Procedure: COMBINED ESOPHAGOSCOPY, GASTROSCOPY, DUODENOSCOPY (EGD), BIOPSY SINGLE OR MULTIPLE;  Surgeon: Clemente Lopez MD;  [facility]     ESOPHAGOSCOPY, GASTROSCOPY, DUODENOSCOPY (EGD), COMBINED N/A 11/26/2017    Procedure: COMBINED ESOPHAGOSCOPY, GASTROSCOPY, DUODENOSCOPY (EGD), REMOVE FOREIGN BODY;  Esophagogastroduodenoscopy with foreign body extraction  ;  Surgeon: Herberth Castrejon MD;  [facility]     ESOPHAGOSCOPY, GASTROSCOPY, DUODENOSCOPY (EGD), COMBINED N/A 11/26/2017    Procedure: COMBINED  ESOPHAGOSCOPY, GASTROSCOPY, DUODENOSCOPY (EGD), REMOVE FOREIGN BODY;;  Surgeon: Herberth Castrejon MD;  [facility]     FASCIOTOMY LOWER EXTREMITY Left 6/10/2016    Procedure: FASCIOTOMY LOWER EXTREMITY;  Surgeon: Mello Rodriguez MD;  [facility]     HC CAPSULE ENDOSCOPY N/A 8/25/2014    Procedure: CAPSULE/PILL CAM ENDOSCOPY;  Surgeon: Remy Haskins MD;  [facility]     HC CAPSULE ENDOSCOPY N/A 10/2/2014    Procedure: CAPSULE/PILL CAM ENDOSCOPY;  Surgeon: Remy Haskins MD;  [facility]     ORTHOPEDIC SURGERY      broken wrist repair     SEX TRANSFORMATION SURGERY, MALE TO FEMALE      1974     SINUS SURGERY      cyst removed     TONSILLECTOMY       VASCULAR SURGERY      Left carotid stent     Current Outpatient Medications   Medication Sig Dispense Refill     metFORMIN (GLUCOPHAGE) 500 MG tablet TAKE 1 TAB BY MOUTH IN THE EVENING WITH DINNER 30 tablet 5     pregabalin (LYRICA) 75 MG capsule Take 150 mg by mouth 3 times daily       ACETAMINOPHEN PO Take 1,000 mg by mouth every 6 hours as needed for pain Not to exceed 4000 mg/day       ADVAIR DISKUS 250-50 MCG/DOSE diskus inhaler Inhale 1 puff into the lungs 2 times daily 60 Inhaler 11     albuterol (PROVENTIL) (2.5 MG/3ML) 0.083% neb solution INHALE 1 VIAL VIA NEBULIZER EVERY 6 HOURS AS NEEDED 360 mL 11     alendronate (FOSAMAX) 70 MG tablet Take 1 tablet (70 mg) by mouth with 8oz water every 7 days 30 minutes before breakfast and remain upright during this time. 12 tablet 3     aspirin EC 81 MG EC tablet Take 1 tablet (81 mg) by mouth daily 90 tablet 3     atorvastatin (LIPITOR) 40 MG tablet TAKE 1 TAB BY MOUTH ONCE DAILY 30 tablet 11     blood glucose monitoring (ONE TOUCH ULTRA 2) meter device kit Use to test blood sugars 3 times daily or as directed. 1 kit 0     blood glucose monitoring (ONE TOUCH ULTRA) test strip Use to test blood sugars 3 times daily or as directed. 3 Box 3     blood glucose monitoring (ONE TOUCH  ULTRASOFT) lancets Use to test blood sugar 3 times daily or as directed. 100 each PRN     brimonidine (ALPHAGAN) 0.2 % ophthalmic solution Place 1 drop into the right eye 2 times daily 1 Bottle 11     cetirizine (ZYRTEC) 10 MG tablet Take 1 tablet (10 mg) by mouth every evening 30 tablet 3     Cholecalciferol (VITAMIN D) 2000 UNITS tablet Take 2,000 Units by mouth daily. 100 tablet 3     clotrimazole (LOTRIMIN) 1 % cream INSERT 1 APPLICATORFUL VAGINALLY TWICE A DAY FOR VAGINAL PAIN 12 g 0     clotrimazole (LOTRIMIN) 1 % external cream Apply topically 2 times daily 12 g 0     diclofenac (VOLTAREN) 1 % GEL topical gel Apply 2 g topically 4 times daily to hands 100 g 11     dorzolamide (TRUSOPT) 2 % ophthalmic solution Place 1 drop into the right eye 2 times daily 1 Bottle 11     EPINEPHrine (EPIPEN/ADRENACLICK/OR ANY BX GENERIC EQUIV) 0.3 MG/0.3ML injection 2-pack Inject 0.3 mLs (0.3 mg) into the muscle as needed for anaphylaxis 0.6 mL 1     escitalopram (LEXAPRO) 10 MG tablet TAKE 1 TAB BY MOUTH ONCE DAILY 30 tablet 11     ferrous sulfate (IRON) 325 (65 FE) MG tablet Take 1 tablet (325 mg) by mouth 2 times daily With meals 60 tablet 2     fluticasone (FLONASE) 50 MCG/ACT nasal spray Spray 1 spray into both nostrils daily       lactulose (CHRONULAC) 10 GM/15ML solution Take 20 g by mouth as needed for constipation       latanoprost (XALATAN) 0.005 % ophthalmic solution Place 1 drop into both eyes At Bedtime 2.5 mL 11     levETIRAcetam (KEPPRA) 500 MG tablet TAKE 1 TAB BY MOUTH TWICE A DAY 60 tablet 5     lisinopril (PRINIVIL/ZESTRIL) 20 MG tablet TAKE 1 TAB BY MOUTH ONCE DAILY 30 tablet 11     metoprolol succinate ER (TOPROL-XL) 50 MG 24 hr tablet TAKE 1 TAB BY MOUTH ONCE DAILY 30 tablet 11     nicotine (NICODERM CQ) 14 MG/24HR 24 hr patch Place 1 patch onto the skin every 24 hours 30 patch 3     nitroglycerin (NITROSTAT) 0.4 MG SL tablet Place 1 tablet (0.4 mg) under the tongue every 5 minutes as needed for chest  pain if you are still having symptoms after 3 doses (15 minutes) call 911. 52 tablet 1     ondansetron (ZOFRAN-ODT) 8 MG ODT tab Take 1 tablet (8 mg) by mouth every 8 hours as needed for nausea 30 tablet 0     order for DME Equipment being ordered: Prosthetic legs 2 each 0     order for DME Equipment being ordered: Prosthetic 1 each 0     order for DME Equipment being ordered: lift recliner 1 Device 0     order for DME Equipment being ordered: Hospital Bed with side rails 1 each 0     order for DME Equipment being ordered: Power Wheel Chair 1 Device 0     order for DME Equipment being ordered: bandage tape, prefer paper 1 Package 0     order for DME Full electric hospital bed with half rails    Dx: I85005, I110, J449  Length of need: lifetime 1 Device 0     order for DME Wheel Chair Cushion: 18 x 18 inch Roho cushion 1 Device 0     order for DME Hospital bed with side rails 1 Device 0     order for DME Equipment being ordered: CPAP supplies mask, hose, filters, cushion    fax to [facility] at [phone] 10 Device prn     order for DME Equipment being ordered: CPAP supplies mask, hose, filters, cushion fax to [facility] at [phone]  Disp: 10 Device Refills: prn   Class: Local Print Start: 2/10/2017 1 Device 0     order for DME Equipment being ordered: Nebulizer and tubing supplies 1 Units 0     order for DME Equipment being ordered: Glucerna daily shakes with each meal 1 Box 11     ORDER FOR DME Equipment being ordered: Power Wheelchair 1 Device 0     ORDER FOR DME Equipment being ordered: Depends briefs 1 Month 11     oxyCODONE (ROXICODONE) 5 MG tablet TAKE 1 TABLET BY MOUTH EVERY 6 HOURS AS NEEDED  0     pantoprazole (PROTONIX) 40 MG EC tablet TAKE 1 TAB BY MOUTH ONCE DAILY 30 tablet 11     phenytoin (DILANTIN) 100 MG capsule TAKE 2 CAPS (200MG) BY MOUTH TWICE A  capsule 11     polyethylene glycol (MIRALAX/GLYCOLAX) Packet Take 17 g by mouth daily Dissolved in water or juice        risperiDONE  (RISPERDAL) 0.5 MG tablet TAKE 1 TAB BY MOUTH AT BEDTIME 30 tablet 5     sennosides (SENOKOT) 8.6 MG tablet Take 1 tablet by mouth 2 times daily        solifenacin (VESICARE) 10 MG tablet TAKE 1 TAB BY MOUTH ONCE DAILY 30 tablet 11     sucralfate (CARAFATE) 1 GM tablet TAKE 1 TAB BY MOUTH FOUR TIMES DAILY. MAY DISSOLVE IN 10ML WATER ISNECESSARY 120 tablet 11     traZODone (DESYREL) 150 MG tablet TAKE 1 TAB BY MOUTH AT BEDTIME 30 tablet 11     umeclidinium (INCRUSE ELLIPTA) 62.5 MCG/INH inhaler Inhale 1 puff into the lungs daily 1 Inhaler 6     VENTOLIN  (90 Base) MCG/ACT inhaler Inhale 2 puffs into the lungs every 6 hours as needed for shortness of breath / dyspnea or wheezing 8.5 g 11     OTC products: None, except as noted above    Allergies   Allergen Reactions     Bee Venom      Penicillins Anaphylaxis     Other reaction(s): Skin Rash and/or Hives. Tolerated cefepime in 12/2016     Dilantin [Phenytoin] Other (See Comments)     Generic dilantin only per pt     Iodide Hives     09/11/15: Pt states she can use iodine and doesn't have any problems      Iodine-131      Novocaine [Procaine] Hives     Other reaction(s): Skin Rash and/or Hives     Tositumomab       Latex Allergy: NO    Social History     Tobacco Use     Smoking status: Current Every Day Smoker     Packs/day: 0.25     Years: 30.00     Pack years: 7.50     Types: Cigarettes, Cigars     Smokeless tobacco: Never Used   Substance Use Topics     Alcohol use: No     Alcohol/week: 0.0 standard drinks     History   Drug Use No     Immunization History   Administered Date(s) Administered     Influenza (High Dose) 3 valent vaccine 09/27/2014, 08/29/2015, 09/21/2015, 09/03/2016, 09/06/2017, 08/01/2019     Influenza (IIV3) PF 10/01/2012, 09/01/2013     Pneumo Conj 13-V (2010&after) 10/22/2015     Pneumococcal 23 valent 01/26/2009, 06/01/2011, 02/22/2014     TD (ADULT, 7+) 02/18/2005, 01/01/2011     TDAP Vaccine (Boostrix) 09/06/2017       REVIEW OF SYSTEMS:    CONSTITUTIONAL: NEGATIVE for fever, chills, change in weight  ENT/MOUTH: NEGATIVE for ear, mouth and throat problems  RESP: NEGATIVE for significant cough or SOB  CV: NEGATIVE for chest pain, palpitations or peripheral edema  GI: NEGATIVE for nausea, abdominal pain, heartburn, or change in bowel habits  : NEGATIVE for frequency, dysuria, or hematuria  MUSCULOSKELETAL: NEGATIVE for significant arthralgias or myalgia  NEURO: NEGATIVE for weakness, dizziness or paresthesias  HEME: NEGATIVE for bleeding problems  PSYCHIATRIC: NEGATIVE for changes in mood or affect    EXAM:   BP 96/50   Pulse 83   Temp 98.9  F (37.2  C) (Oral)   Resp 16   Wt 59 kg (130 lb)   SpO2 97%   BMI 49.43 kg/m      GENERAL APPEARANCE: alert and no distress in motorized wheelchair     EYES: EOMI, PERRL     HENT: ear canals and TM's normal and nose and mouth without ulcers or lesions     NECK: no adenopathy, no asymmetry, masses, or scars and thyroid normal to palpation     RESP: lungs clear to auscultation - no rales, rhonchi or wheezes     CV: regular rates and rhythm, normal S1 S2, no S3 or S4 and no click or rub     ABDOMEN:  soft, nontender, no HSM or masses and bowel sounds normal     MS: In wheelchair with bilateral lower extremity amputations noted     NEURO: No new focal changes     PSYCH: mentation appears normal and affect normal/bright    DIAGNOSTICS:     EKG: Normal Sinus Rhythm @ 85 had to be done in wheelchair with upright. positioning, nonspecific ST-T changes, no acute changes  Labs Drawn and in Process:   Unresulted Labs Ordered in the Past 30 Days of this Admission     No orders found from 9/23/2019 to 10/24/2019.          Recent Labs   Lab Test 09/17/19  1123 08/05/19  1034 06/19/19  1026  06/14/19  1713 04/17/19  1511  06/06/18  1149  02/08/18  0843   HGB 11.1* 12.1 12.6   < > 11.6*  --    < >  --    < > 13.4    278 376   < > 411  --    < >  --    < > 256   INR  --   --   --   --  1.10  --   --   --   --  1.04      --  142   < > 140 138   < >  --    < > 139   POTASSIUM 3.8  --  3.6   < > 3.5 4.1   < >  --    < > 4.0   CR 0.48*  --  0.61   < > 0.61 0.70   < >  --    < > 0.43*   A1C  --   --   --   --   --  5.0  --  5.1   < >  --     < > = values in this interval not displayed.     IMPRESSION:   Reason for surgery/procedure: She requires evaluation and anesthesia risk assessment prior to undergoing surgery/procedure for treatment of neuromuscular dysfunction of bladder .    Proposed Surgery/ Procedure: Cystoscopy   Date of Surgery/ Procedure: 10/30/19    The proposed surgical procedure is considered mild risk.    REVISED CARDIAC RISK INDEX  The patient has the following serious cardiovascular risks for perioperative complications such as (MI, PE, VFib and 3  AV Block):  Diabetes Mellitus (on oral therapy)  INTERPRETATION: 2 risks: Class III (moderate risk - 6.6% complication rate)    The patient has the following additional risks for perioperative complications:  No identified additional risks      ICD-10-CM    1. Preop general physical exam Z01.818 Potassium     Hemoglobin     Platelet count     EKG 12-lead complete w/read - Clinics   2. Urinary disorder N39.9    3. Type 2 diabetes mellitus with diabetic peripheral angiopathy without gangrene, without long-term current use of insulin (H) E11.51 Glucose   4. 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 (H) Z89.511    5. Seizure disorder (H) G40.909    6. PAD (peripheral artery disease) (H) I73.9    7. Chronic systolic congestive heart failure (H) I50.22    8. CVA, old, hemiparesis (H) I69.359    9. Ischemic cardiomyopathy I25.5    10. Simple chronic bronchitis (H) J41.0    11. Chronic pain syndrome G89.4        RECOMMENDATIONS:     Consult hospital rounder / IM to assist post-op medical management    Cardiovascular Risk  Patient currently has no active or acute complaints.    Anemia  Anemia and does not require treatment prior to surgery.  Monitor  Hemoglobin postoperatively.    Patient is to take all scheduled medications on the day of surgery EXCEPT for modifications listed below.    Diabetes Medication Use  Hold usual oral and non-insulin diabetic meds (e.g. Metformin) while NPO.     Anticoagulant or Antiplatelet Medication Use  ASPIRIN: Discontinue ASA 7 days prior to procedure to reduce bleeding risk.  It should be resumed post-operatively.      ACE Inhibitor or Angiotensin Receptor Blocker (ARB) Use  Ace inhibitor or Angiotensin Receptor Blocker (ARB) and should HOLD this medication for the 24 hours prior to surgery.    Note issues of chronic pain medication use.    Smoking cessation was advised and the risks of continued smoking in regards to this patients health history was reiterated. Options of smoking cessation were also discussed. This time extended beyond the routine exam.    APPROVAL GIVEN to proceed with proposed procedure, without further diagnostic evaluation     Signed Electronically by: Allan Casey MD    Copy of this evaluation report is provided to requesting physician, Dr. Juventino Alex Preop Guidelines    Revised Cardiac Risk Index

## 2019-10-24 ENCOUNTER — HOSPITAL ENCOUNTER (OUTPATIENT)
Dept: OCCUPATIONAL THERAPY | Facility: CLINIC | Age: 69
Setting detail: THERAPIES SERIES
End: 2019-10-24
Attending: INTERNAL MEDICINE
Payer: COMMERCIAL

## 2019-10-24 DIAGNOSIS — H40.1133 PRIMARY OPEN ANGLE GLAUCOMA OF BOTH EYES, SEVERE STAGE: ICD-10-CM

## 2019-10-24 PROCEDURE — 97166 OT EVAL MOD COMPLEX 45 MIN: CPT | Mod: GO | Performed by: OCCUPATIONAL THERAPIST

## 2019-10-24 PROCEDURE — 97535 SELF CARE MNGMENT TRAINING: CPT | Mod: GO | Performed by: OCCUPATIONAL THERAPIST

## 2019-10-24 ASSESSMENT — VISUAL ACUITY
OS: NLP
OD: 20/150

## 2019-10-24 ASSESSMENT — ACTIVITIES OF DAILY LIVING (ADL)
PRIOR_ADL/IADL_STATUS: IMPAIRED PRIOR TO ONSET
ADL_EQUIPMENT_USED: ADAPTIVE ADL EQUIPMENT;GRAB BAR;RAISED TOILET SEAT;TUB/SHOWER CHAIR

## 2019-10-24 NOTE — PROGRESS NOTES
"   10/24/19 0900   Visit Type   Type of Visit Initial       Present No   General Information   Start Of Care Date 10/24/19   Referring Physician MD Sharda    Orders Evaluate And Treat As Indicated   Date of Order 09/24/19   Medical Diagnosis Glaucoma,    Onset Of Illness/injury Or Date Of Surgery 09/24/2019   Surgical/Medical history reviewed Yes  (COPD, epilipsy, H/O 3 CVAS, 3 MI, dbl. amputee LE, arthritis)   Precautions/Limitations bladder surgery on 10/30, double L/E amputee, arthiritis in hands, petite mal seizures (has not [place] mall in 8 years), s/p stroke   Additional Occupational Profile Info/Pertinent History of Current Problem lives at SNF.  Spouse passed away 2 years ago.  Lived with sister in [place] last year.  Pt's medical needs became \"too great\" for sister to manage.  Returned to [place] for SNF   Prior ADL/IADL status Impaired prior to onset   Patient/family Goals Statement cell phone communications (iphone), computer communications (has an ipad), reading small print, reading email. locating objects, operating motorized chair, identifying clothing color   Social History/Home Environment   Living Environment SNF   Current Community Support   (children are in town, expecting 3rd grandchild)   Patient Role/employment History  Retired   Avocational TV, books on tape, plays bells, history on youtube   Pain Assessment   Pain Reported Yes   Pain Location back - degenerative disc (Has pump to manage pain), stumps of nerves   Pain Scale 8/10   Comments attends pain clinic   Fall Risk Screen   Fall screen completed by OT   Have you fallen 2 or more times in the past year? Yes   Have you fallen and had an injury in the past year? No   Is patient a fall risk? No   Fall screen comments fell when in standard wheelchair. No longer uses.  Ind. with all transfers in SNF .  Is currently receiving P.T. to learn to use prosthesis - 5 days a week.  [facility] is scheduled to " do mobility training with cane in [place]   Cognitive/Behavioral   Communication Intact   Cognitive Status Intact for evaluation process   Behavior Appropriate   Patient/family aware of diagnosis Yes   How well do you understand your eye condition? Well   Vision related restrictions on visiting with family/friends Moderate   Reported emotional impact of visual impairment Moderate   Adjustment to disability Good   Cognitive/Behavioral Comment is being treated for depression   Physical Status/Equipment   Physical Status Weakness;Impaired balance  (double amputee - Lower extremities)   Mobility equipment used Wheelchair  (motorized)   ADL Equipment used Adaptive ADL Equipment;Grab bar;Raised toilet seat;Tub/shower chair  (sliding board)   Visual Report   Functional Complaints Reading;Writing;Safety in mobility  (technology use/communications, glare management)   Visual Complaints Constricted visual fields right eye;Constricted visual fields left eye   Mike Bonnet Symptoms? Yes   Magnifier (strength and type) Magnifier is at sister's in [place]. Pt hopes to receive it in the mail. Uses magnifying ashish on cell phone   Reading glasses Single Lens  (+3.50 OTC readers)   Technology Computer  (ipad and iphone)   Lighting and Glare   Is your lighting adequate? No/ at home   Is glare a problem? Yes/ indoors;Yes/ outdoors   Are you satisfied with your sunglasses?   (needs sunglasses for indoors)   Sunglass filter color Gray   Visual Acuity   Acuity right eye 20/150   Acuity left eye NLP   Contrast Sensitivity   Contrast sensitivity (score/25) 15/25   MN Read   Smallest print size read 8.0M   Critical print size 8.0M   Words per minute at critical print size 54   Functional Reading Screen   Reading screen comments scored self at 57% visual impairment on SRAFVP   Education   Learner Patient   Readiness Eager   Method Demonstration   Response Verbalizes understanding   Clinical Impression, OT Eval   Criteria for Skilled Therapeutic  Interventions Met yes;treatment indicated   Therapy  Diagnosis: Impaired ADL/IADL with deficits in Reading based ADL;Written communication;Financial management  (dressing, locating objects in home, technology use, glare )   Assessment of Occupational Performance 5 or more Performance Deficits   Identified Performance Deficits as above   Clinical Decision Making (Complexity) Moderate complexity   OT Visual Rehabilitation Evaluation Plan   Therapy Plan Occupational therapy intervention   Planned Interventions Visual skills training for near tasks;Visual skills training for safety in mobility;Visual skills training for location of objects;Low vision compensatory training for reading;Low vision compensatory training for written communication;Low vision compensatory trainging for financial management;Instruction in environmental adaptations for glare;Instruction in environmental adaptations for contrast;Instruction in environmental adaptations for lighting;Optical device/ADL device instruction and training;Computer modifications;Instruction in community resources   Frequency / Duration 5 tx visits over 12 weeks - 1X every 2-3 weeks for 12 weeks   Risks and Benefits of Treatment have been explained. Yes   Patient, Family in agreement with plan of care Yes   GOALS   Goals Near Vision;Visual Field;Environmental Modification;Resource Education;Distance Viewing   Goals Addressed this Session Near vision   Goal 1 - Near Vision   Goal Description: Near Vision Patient will verbalize awareness of visual field Loss and demonstrate improved use of visual skills/adaptive equipment for increased independence in reading-based activities of daily living, written communication and detail ADL tasks.   Target Date 01/16/20   Goal 2 - Visual field   Goal Description: Visual field Patient will verbalize awareness of visual field loss and demonstrate improved use of visual skills for increased safety/ independence in locating  objects/obstacles and in navigation   Target Date 01/16/20   Goal 3 - Environmental modification   Goal Description: Environment modification Patient will demonstrate understanding of the impact of lighting, contrast and glare on ADL activities, in conjunction with environmentally-based ADL modifications   Target Date 01/16/20   Goal 4 - Resource education   Goal Description: Resource education Patient will verbalize awareness of community resources for the following:;Access to low vision devices   Target Date 01/16/20   Goal 5 - Distance viewing   Goal Description: Distance viewing Patient will demonstrate proficient use of a distance device in conjunction with appropriate visual skills techniques to correctly survey objects in the distance   Target Date 01/16/20   Total Evaluation Time   OT Karolina Moderate Complexity Minutes ([id number]) 45   Therapy Certification   Certification date from 10/24/19   Certification date to 01/16/20   Medical Diagnosis Glaucoma

## 2019-10-24 NOTE — PROGRESS NOTES
[facility]          OUTPATIENT OCCUPATIONALTHERAPY  EVALUATION  PLAN OF TREATMENT FOR OUTPATIENT REHABILITATION  (COMPLETE FOR INITIAL CLAIMS ONLY)  Patient's Last Name, First Name, M.I.  YOB: 1950  Sonya Foote         Provider's Name  [facility] Medical Record No.  [id number]   Onset Date:  09/24/2019 Start of Care Date:  10/24/19   Type:     ___PT  _X_OT   ___SLP Medical Diagnosis:  Glaucoma   Therapy Diagnosis: Impaired ADL/IADL with deficits in Reading based ADL, Written communication, Financial management(dressing, locating objects in home, technology use, glare )    Visits from SOC: 1     _________________________________________________________________________________  Plan of Treatment/Functional Goals:  Planned Interventions: Visual skills training for near tasks, Visual skills training for safety in mobility, Visual skills training for location of objects, Low vision compensatory training for reading, Low vision compensatory training for written communication, Low vision compensatory trainging for financial management, Instruction in environmental adaptations for glare, Instruction in environmental adaptations for contrast, Instruction in environmental adaptations for lighting, Optical device/ADL device instruction and training, Computer modifications, Instruction in community resources        Goals  1. Patient will verbalize awareness of visual field Loss and demonstrate improved use of visual skills/adaptive equipment for increased independence in reading-based activities of daily living, written communication and detail ADL tasks.         Target Date: 01/16/20      2. Patient will verbalize awareness of visual field loss and demonstrate improved use of visual skills for increased safety/ independence in locating objects/obstacles and in navigation          Target Date: 01/16/20      3.  Patient will demonstrate understanding of the impact of lighting, contrast and glare on ADL activities, in conjunction with environmentally-based ADL modifications         Target Date: 01/16/20      4. Patient will verbalize awareness of community resources for the following:, Access to low vision devices         Target Date: 01/16/20      5. Patient will demonstrate proficient use of a distance device in conjunction with appropriate visual skills techniques to correctly survey objects in the distance          Target Date: 01/16/20      6.                    7.                 8.                            Therapy Frequency/Duration:  5 tx visits over 12 weeks - 1X every 2-3 weeks for 12 weeks    Manuela Mitchell OT       I CERTIFY THE NEED FOR THESE SERVICES FURNISHED UNDER        THIS PLAN OF TREATMENT AND WHILE UNDER MY CARE     (Physician co-signature of this document indicates review and certification of the therapy plan).                  Certification date from: 10/24/19 Certification date to: 01/16/20          Referring Physician: MD Sharda      Initial Assessment        See Epic Evaluation Start Of Care Date: 10/24/19     Manuela Mitchell OT

## 2019-10-24 NOTE — DISCHARGE INSTRUCTIONS
Visual Rehabilitation Summary      10/24/2019  Completed Occupational Therapy Visual Rehabilitation evaluation this date.  Pt with goals for increasing independence in use of her cell phone and ipad, reading small print, locating objects in her room,  managing glare indoors, and identifying the color of her clothing.  Plan is to attend 3 treatment sessions to address these goals.  Treatment schedule is attached.    Manuela Mitchell OTR/L  [facility]  Visual Rehabilitation Services  [phone]

## 2019-10-28 ENCOUNTER — TELEPHONE (OUTPATIENT)
Dept: UROLOGY | Facility: CLINIC | Age: 69
End: 2019-10-28

## 2019-10-28 ENCOUNTER — ANCILLARY PROCEDURE (OUTPATIENT)
Dept: RADIOLOGY | Facility: AMBULATORY SURGERY CENTER | Age: 69
End: 2019-10-28
Attending: NURSE PRACTITIONER
Payer: COMMERCIAL

## 2019-10-28 ENCOUNTER — OFFICE VISIT (OUTPATIENT)
Dept: UROLOGY | Facility: CLINIC | Age: 69
End: 2019-10-28
Payer: COMMERCIAL

## 2019-10-28 VITALS
WEIGHT: 130 LBS | SYSTOLIC BLOOD PRESSURE: 122 MMHG | DIASTOLIC BLOOD PRESSURE: 71 MMHG | BODY MASS INDEX: 49.43 KG/M2 | HEART RATE: 90 BPM

## 2019-10-28 DIAGNOSIS — N39.0 RECURRENT UTI: ICD-10-CM

## 2019-10-28 DIAGNOSIS — R35.0 URINARY FREQUENCY: ICD-10-CM

## 2019-10-28 DIAGNOSIS — R39.81 FUNCTIONAL INCONTINENCE: ICD-10-CM

## 2019-10-28 DIAGNOSIS — R39.89 POSSIBLE URINARY TRACT INFECTION: Primary | ICD-10-CM

## 2019-10-28 LAB
APPEARANCE UR: CLEAR
BILIRUB UR QL: NORMAL
COLOR UR: YELLOW
GLUCOSE URINE: NORMAL MG/DL
HGB UR QL: NORMAL
KETONES UR QL: NORMAL MG/DL
LEUKOCYTE ESTERASE URINE: NORMAL
NITRITE UR QL STRIP: NORMAL
PH UR STRIP: 7 PH (ref 5–7)
PROTEIN ALBUMIN URINE: NORMAL MG/DL
SOURCE: NORMAL
SP GR UR STRIP: 1.01 (ref 1–1.03)
UROBILINOGEN UR QL STRIP: 0.2 EU/DL (ref 0.2–1)

## 2019-10-28 RX ORDER — SULFAMETHOXAZOLE/TRIMETHOPRIM 800-160 MG
1 TABLET ORAL ONCE
Status: COMPLETED | OUTPATIENT
Start: 2019-10-28 | End: 2019-10-28

## 2019-10-28 RX ADMIN — SULFAMETHOXAZOLE AND TRIMETHOPRIM 1 TABLET: 800; 160 TABLET ORAL at 08:24

## 2019-10-28 ASSESSMENT — PAIN SCALES - GENERAL: PAINLEVEL: NO PAIN (0)

## 2019-10-28 NOTE — TELEPHONE ENCOUNTER
[facility] Call Center    Phone Message    May a detailed message be left on voicemail: yes    Reason for Call: Other: Sonya returning Angie's call.  This writer was confirming the location, date and time changes to her when the call dropped.  I also tried to call her back and got a voicemail.  I left Angie's contact information for her to return the call.  Please call her back at your earliest convenience to discuss the changes     Action Taken: Message routed to:  [facility] ([facility] ANTONIO Uro

## 2019-10-28 NOTE — LETTER
10/28/2019       RE: Sonya Foote  [address]  [place] [address]     Dear Colleague,    Thank you for referring your patient, Sonya Foote, to the [facility] AND [facility]CERS at [facility]. Please see a copy of my visit note below.    PREPROCEDURE DIAGNOSES:    1. Functional incontinence managed with SP tube  2. Urinary frequency per urethra  3. Recurrent UTIs    POSTPROCEDURE DIAGNOSES:  -Normal bladder capacity (334 mL) with somewhat heightened filling sensations.  -Good bladder compliance without DO/DOI.  -No EDISON.  -Good detrusor contraction during voiding to max Pdet 32 cm H2O.  -Moderate flow rate (Qmax 12 mL/s) with a fluctuating flow curve and complete bladder emptying (final PVR 34 mL).  -EMG quiet throughout the study  -BOOI does not suggest bladder outlet obstruction.  -Fluoroscopy reveals a mildly-trabeculated bladder wall with one presumed diverticulae at the superior aspect. Vesicoureteral reflux was observed on the left shortly after filling was initiated. This remained relatively constant throughout the study, never progressing beyond grade I. Very slight VUR was also noted midway through filling on the right, but this also never progressed beyond mild grade I. The bladder neck was closed during filling and somewhat closed, as well, during voiding.     PROCEDURE:    1. Sterile urethral catheterization for measurement of postvoid residual urine volume.  2. Complex filling cystometrogram with measurement of bladder and rectal pressures.  3. Complex voiding cystometrogram with measurement of bladder and rectal pressures.  4. Electromyography of the pelvic floor during urodynamics.  5. Fluoroscopic imaging of the bladder during urodynamics, at least 3 views.    6. Interpretation of urodynamics and flouroscopic imaging.      INDICATIONS FOR PROCEDURE:  Sonya Foote is a pleasant 69 year old female with functional incontinence managed with  SP tube, urinary frequency per urethra, recurrent UTIs. Baseline video urodynamic assessment is requested today by Dr. Hebert to better characterize the patient's voiding dysfunction.      VOIDING DIARY:  Not completed.    DESCRIPTION OF PROCEDURE:  Risks, benefits, and alternatives to urodynamics were discussed with the patient and she wished to proceed.  Urodynamics are planned to better assess the primary etiology for Ms. Foote's urologic dysfunction.  After informed consent was obtained, the patient was taken to the procedure room where the study was initiated. Findings below.     PRE-STUDY UROFLOWMETRY:  Postvoid residual by catheter: 15 mL.  Pretest urine dipstick was positive for small leukocytes and negative nitrites.    Next a 7F double-lumen urodynamics catheter was inserted into the bladder under sterile technique via the SP tract.  A 7F abdominal manometry catheter was placed in the rectum.  EMG pads were placed on both sides of the anal verge.  The bladder was filled with 200 mL of Omnipaque at 30 mL/minute and serial pressures were recorded.  With coughing there was an appropriate rise in vesical and abdominal pressures with no change in detrusor pressure, confirming good study catheter placement.    DURING THE FILLING PHASE:  First sensation: 142 mL.  First Desire: 224 mL.  Strong Desire: 235 mL.  Maximum Capacity: 334 mL.    Uninhibited detrusor contractions: None.  Compliance: Good. PDet=5 cmH20 at capacity. Compliance ratio of 66.  Continence: No DOI or EDISON.  EMG: Concordant during filling.    DURING THE VOIDING PHASE:  Maximum detrusor contraction with void: 32 cm of H2O pressure.  Voided volume: 299 mL.  Maximum flow rate: 12 mL/sec.  Average flow rate: 3.9 mL/sec.  Postvoid Residual: 34 mL.  EMG activity: Quiet.  Character of voiding curve: Fluctuating.  BOOI: -0.8 (suggesting no obstruction - see key below)  [obstructed (ANDERSON index [BOOI] ? 40); equivocal (no definite   obstruction; BOOI  20-40); and no obstruction (BOOI ? 20)]    FLUOROSCOPIC IMAGING OF THE BLADDER DURING URODYNAMICS:  Please note, image numbers on UDS tracings correlate with iSite series numbers on PACS images. Fluoroscopy during today's procedure demonstrated a mildly-trabeculated bladder wall with one presumed diverticulae at the superior aspect. Vesicoureteral reflux was observed on the left shortly after filling was initiated. This remained relatively constant throughout the study, never progressing beyond grade I. Very slight VUR was also noted midway through filling on the right, but this also never progressed beyond mild grade I. The bladder neck was closed during filling and somewhat closed, as well, during voiding.  After voiding to completion, all catheters were removed and the patient was brought back into the consultation room to further discuss today's study results.      ASSESSMENT/PLAN:  Ms. Sonya Foote is a pleasant 69 year old female with functional incontinence managed with SP tube, ongoing urinary frequency per urethra, and recurrent UTIs who demonstrated the following findings today on urodynamic evaluation:    -Normal bladder capacity (334 mL) with somewhat heightened filling sensations.  -Good bladder compliance without DO/DOI.  -No EDISON.  -Good detrusor contraction during voiding to max Pdet 32 cm H2O.  -Moderate flow rate (Qmax 12 mL/s) with a fluctuating flow curve and complete bladder emptying (final PVR 34 mL).  -EMG quiet throughout the study  -BOOI does not suggest bladder outlet obstruction.  -Fluoroscopy reveals a mildly-trabeculated bladder wall with one presumed diverticulae at the superior aspect. Vesicoureteral reflux was observed on the left shortly after filling was initiated. This remained relatively constant throughout the study, never progressing beyond grade I. Very slight VUR was also noted midway through filling on the right, but this also never progressed beyond mild grade I. The bladder  neck was closed during filling and somewhat closed, as well, during voiding.     The patient will follow up as scheduled with Dr. Hebert to further discuss today's study results and make plans for how best to proceed.      - A single Bactrim DS was provided for UTI prophylaxis following completion of today's study per department protocol.  The risk of UTI with VUDS is low at ~2.5-3%.      Thank you for allowing me to participate in the care of Ms. Sonya Foote and please don't hesitate to contact me with any questions or concerns.      This procedure was performed under a collaborative agreement with Dr. Loki Gordon, Professor and  of Urology, [facility].    VICTOR M Barahona, CNP  Department of Urology       Again, thank you for allowing me to participate in the care of your patient.      Sincerely,    Melissa Mcnamara CNP

## 2019-10-28 NOTE — PATIENT INSTRUCTIONS
Please follow up with Dr. Hebert as scheduled for your Botox.    It was a pleasure meeting with you today.  Thank you for allowing me and my team the privilege of caring for you today.  YOU are the reason we are here, and I truly hope we provided you with the excellent service you deserve.  Please let us know if there is anything else we can do for you so that we can be sure you are leaving completely satisfied with your care experience.        Kunal Arriaga, EMT

## 2019-10-28 NOTE — TELEPHONE ENCOUNTER
Called to inform patient of surgery location change from 10/30/19 @ [facility] OR to 10/31/19 @ [facility] OR with Dr Gordon due to patient not meeting criteria for ASC. Left voice mail for patient to call back to confirm at [phone]

## 2019-10-28 NOTE — NURSING NOTE
Marcus Hebert    Hx of OAB.UTI w/ SP tube    She leaks continuously via her urethra wearing 3-4 pads per day.    She states she as not had her SP tube changed in three months.      Chief Complaint   Patient presents with     Urodynamics Study     UTI/ OAB       Blood pressure 122/71, pulse 90, weight 59 kg (130 lb), not currently breastfeeding. Body mass index is 49.43 kg/m .    Patient Active Problem List   Diagnosis     Seizure disorder (H)     Osteoporosis     Schizoaffective disorder (H)     AS (sickle cell trait) (H)     Vertigo     Person who has had sex change operation     Claudication in peripheral vascular disease (H)     Intestinal malabsorption     GI bleed     Cervicalgia     Asthma     Adjustment disorder with depressed mood     Chronic pain syndrome     Open-angle glaucoma     Hx of colonic polyp     Iron deficiency anemia     Late effect of stroke     Degeneration of intervertebral disc of lumbosacral region     Thoracic or lumbosacral neuritis or radiculitis     Cerebral infarction due to occlusion or stenosis of carotid artery     Disorder of bone and cartilage     Hereditary and idiopathic peripheral neuropathy     Androgen insensitivity syndrome     PAD (peripheral artery disease) (H)     Chronic systolic congestive heart failure (H)     Stenosis of carotid artery     Osteoarthritis     Pain in both upper extremities     Atherosclerotic peripheral vascular disease with rest pain (H)     Essential hypertension     Type 2 diabetes mellitus with diabetic peripheral angiopathy without gangrene, without long-term current use of insulin (H)     Anemia, unspecified type     Critical lower limb ischemia     Testicular feminization     Anxiety disorder due to general medical condition     Central retinal artery occlusion     Lumbosacral radiculitis     Peripheral neuropathy     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 (H)     Primary open angle glaucoma of both eyes, severe stage      Pseudophakia of right eye     Cataract, left eye     Diabetes mellitus type 2 without retinopathy (H)     Simple chronic bronchitis (H)     JOSE (obstructive sleep apnea)     Complex sleep apnea syndrome     Coronary artery disease of native artery of native heart with stable angina pectoris (H)     Ischemic cardiomyopathy     Bee sting reaction, undetermined intent, subsequent encounter     Functional incontinence     Personal history of urinary tract infection     Dysphagia     Hyperlipidemia LDL goal <70     Incontinence     Other migraine without status migrainosus, not intractable     CVA, old, hemiparesis (H)     Tobacco abuse     S/P AKA (above knee amputation) bilateral (H)     Chronic, continuous use of opioids     Bladder spasm     History of myocardial infarction     Other neuromuscular dysfunction of bladder       Allergies   Allergen Reactions     Bee Venom      Penicillins Anaphylaxis     Other reaction(s): Skin Rash and/or Hives. Tolerated cefepime in 12/2016     Dilantin [Phenytoin] Other (See Comments)     Generic dilantin only per pt     Iodide Hives     09/11/15: Pt states she can use iodine and doesn't have any problems      Iodine-131      Novocaine [Procaine] Hives     Other reaction(s): Skin Rash and/or Hives     Tositumomab        Current Outpatient Medications   Medication Sig Dispense Refill     ACETAMINOPHEN PO Take 1,000 mg by mouth every 6 hours as needed for pain Not to exceed 4000 mg/day       ADVAIR DISKUS 250-50 MCG/DOSE diskus inhaler Inhale 1 puff into the lungs 2 times daily 60 Inhaler 11     albuterol (PROVENTIL) (2.5 MG/3ML) 0.083% neb solution INHALE 1 VIAL VIA NEBULIZER EVERY 6 HOURS AS NEEDED 360 mL 11     alendronate (FOSAMAX) 70 MG tablet Take 1 tablet (70 mg) by mouth with 8oz water every 7 days 30 minutes before breakfast and remain upright during this time. 12 tablet 3     atorvastatin (LIPITOR) 40 MG tablet TAKE 1 TAB BY MOUTH ONCE DAILY 30 tablet 11     blood glucose  monitoring (ONE TOUCH ULTRA 2) meter device kit Use to test blood sugars 3 times daily or as directed. 1 kit 0     blood glucose monitoring (ONE TOUCH ULTRA) test strip Use to test blood sugars 3 times daily or as directed. 3 Box 3     blood glucose monitoring (ONE TOUCH ULTRASOFT) lancets Use to test blood sugar 3 times daily or as directed. 100 each PRN     brimonidine (ALPHAGAN) 0.2 % ophthalmic solution Place 1 drop into the right eye 2 times daily 1 Bottle 11     cetirizine (ZYRTEC) 10 MG tablet Take 1 tablet (10 mg) by mouth every evening 30 tablet 3     Cholecalciferol (VITAMIN D) 2000 UNITS tablet Take 2,000 Units by mouth daily. 100 tablet 3     clotrimazole (LOTRIMIN) 1 % cream INSERT 1 APPLICATORFUL VAGINALLY TWICE A DAY FOR VAGINAL PAIN 12 g 0     clotrimazole (LOTRIMIN) 1 % external cream Apply topically 2 times daily 12 g 0     diclofenac (VOLTAREN) 1 % GEL topical gel Apply 2 g topically 4 times daily to hands 100 g 11     dorzolamide (TRUSOPT) 2 % ophthalmic solution Place 1 drop into the right eye 2 times daily 1 Bottle 11     EPINEPHrine (EPIPEN/ADRENACLICK/OR ANY BX GENERIC EQUIV) 0.3 MG/0.3ML injection 2-pack Inject 0.3 mLs (0.3 mg) into the muscle as needed for anaphylaxis 0.6 mL 1     escitalopram (LEXAPRO) 10 MG tablet TAKE 1 TAB BY MOUTH ONCE DAILY 30 tablet 11     ferrous sulfate (IRON) 325 (65 FE) MG tablet Take 1 tablet (325 mg) by mouth 2 times daily With meals 60 tablet 2     fluticasone (FLONASE) 50 MCG/ACT nasal spray Spray 1 spray into both nostrils daily       lactulose (CHRONULAC) 10 GM/15ML solution Take 20 g by mouth as needed for constipation       latanoprost (XALATAN) 0.005 % ophthalmic solution Place 1 drop into both eyes At Bedtime 2.5 mL 11     levETIRAcetam (KEPPRA) 500 MG tablet TAKE 1 TAB BY MOUTH TWICE A DAY 60 tablet 5     lisinopril (PRINIVIL/ZESTRIL) 20 MG tablet TAKE 1 TAB BY MOUTH ONCE DAILY 30 tablet 11     metFORMIN (GLUCOPHAGE) 500 MG tablet TAKE 1 TAB BY MOUTH  IN THE EVENING WITH DINNER 30 tablet 5     metoprolol succinate ER (TOPROL-XL) 50 MG 24 hr tablet TAKE 1 TAB BY MOUTH ONCE DAILY 30 tablet 11     nicotine (NICODERM CQ) 14 MG/24HR 24 hr patch Place 1 patch onto the skin every 24 hours 30 patch 3     nitroglycerin (NITROSTAT) 0.4 MG SL tablet Place 1 tablet (0.4 mg) under the tongue every 5 minutes as needed for chest pain if you are still having symptoms after 3 doses (15 minutes) call 911. 25 tablet 1     ondansetron (ZOFRAN-ODT) 8 MG ODT tab Take 1 tablet (8 mg) by mouth every 8 hours as needed for nausea 30 tablet 0     order for DME Equipment being ordered: Prosthetic legs 2 each 0     order for DME Equipment being ordered: Prosthetic 1 each 0     order for DME Equipment being ordered: lift recliner 1 Device 0     order for DME Equipment being ordered: Hospital Bed with side rails 1 each 0     order for DME Equipment being ordered: Power Wheel Chair 1 Device 0     order for DME Equipment being ordered: bandage tape, prefer paper 1 Package 0     order for DME Full electric hospital bed with half rails    Dx: B22567, I110, J449  Length of need: lifetime 1 Device 0     order for DME Wheel Chair Cushion: 18 x 18 inch Roho cushion 1 Device 0     order for DME Hospital bed with side rails 1 Device 0     order for DME Equipment being ordered: CPAP supplies mask, hose, filters, cushion    fax to [facility] at [phone] 10 Device prn     order for DME Equipment being ordered: CPAP supplies mask, hose, filters, cushion fax to [facility] at [phone]  Disp: 10 Device Refills: prn   Class: Local Print Start: 2/10/2017 1 Device 0     order for DME Equipment being ordered: Nebulizer and tubing supplies 1 Units 0     order for DME Equipment being ordered: Glucerna daily shakes with each meal 1 Box 11     ORDER FOR DME Equipment being ordered: Power Wheelchair 1 Device 0     ORDER FOR DME Equipment being ordered: Depends briefs 1 Month 11     oxyCODONE  (ROXICODONE) 5 MG tablet TAKE 1 TABLET BY MOUTH EVERY 6 HOURS AS NEEDED  0     pantoprazole (PROTONIX) 40 MG EC tablet TAKE 1 TAB BY MOUTH ONCE DAILY 30 tablet 11     phenytoin (DILANTIN) 100 MG capsule TAKE 2 CAPS (200MG) BY MOUTH TWICE A  capsule 11     polyethylene glycol (MIRALAX/GLYCOLAX) Packet Take 17 g by mouth daily Dissolved in water or juice        pregabalin (LYRICA) 75 MG capsule Take 150 mg by mouth 3 times daily       risperiDONE (RISPERDAL) 0.5 MG tablet TAKE 1 TAB BY MOUTH AT BEDTIME 30 tablet 5     sennosides (SENOKOT) 8.6 MG tablet Take 1 tablet by mouth 2 times daily        solifenacin (VESICARE) 10 MG tablet TAKE 1 TAB BY MOUTH ONCE DAILY 30 tablet 11     traZODone (DESYREL) 150 MG tablet TAKE 1 TAB BY MOUTH AT BEDTIME 30 tablet 11     umeclidinium (INCRUSE ELLIPTA) 62.5 MCG/INH inhaler Inhale 1 puff into the lungs daily 1 Inhaler 6     VENTOLIN  (90 Base) MCG/ACT inhaler Inhale 2 puffs into the lungs every 6 hours as needed for shortness of breath / dyspnea or wheezing 8.5 g 11     aspirin EC 81 MG EC tablet Take 1 tablet (81 mg) by mouth daily (Patient not taking: Reported on 10/28/2019) 90 tablet 3     sucralfate (CARAFATE) 1 GM tablet TAKE 1 TAB BY MOUTH FOUR TIMES DAILY. MAY DISSOLVE IN 10ML WATER ISNECESSARY (Patient not taking: Reported on 10/23/2019) 120 tablet 11       Social History     Tobacco Use     Smoking status: Current Every Day Smoker     Packs/day: 0.25     Years: 30.00     Pack years: 7.50     Types: Cigarettes, Cigars     Smokeless tobacco: Never Used   Substance Use Topics     Alcohol use: No     Alcohol/week: 0.0 standard drinks     Drug use: No       Invasive Procedure Safety Checklist:    Procedure: Urodynamics    Action: Complete sections and checkboxes as appropriate.  Pre-procedure:  1. Patient ID Verified with 2 identifiers (Karly and  or MRN) : YES    2. Procedure and site verified with patient/designee (when able) : YES    3. Accurate consent  documentation in medical record : YES    4. H&P (or appropriate assessment) documented in medical record : N/A  H&P must be up to 30 days prior to procedure an updated within 24 hours of Procedure as applicable.     5. Relevant diagnostic and radiology test results appropriately labeled and displayed as applicable : YES    6. Blood products, implants, devices, and/or special equipment available for the procedure as applicable : YES    7. Procedure site(s) marked with provider initials [Exclusions: none] : NO    8. Marking not required. Reason : Yes  Procedure does not require site marking    Time Out:     Time-Out performed immediately prior to starting procedure, including verbal and active participation of all team members addressing: YES    1. Correct patient identity.  2. Confirmed that the correct side and site are marked.  3. An accurate procedure to be done.  4. Agreement on the procedure to be done.  5. Correct patient position.  6. Relevant images and results are properly labeled and appropriately displayed.  7. The need to administer antibiotics or fluids for irrigation purposes during the procedure as applicable.  8. Safety precautions based on patient history or medication use.    During Procedure: Verification of correct person, site, and procedure occurs any time the responsibility for care of the patient is transferred to another member of the care team.    The following medication was given: Sulfamethoxazole / Trimethoprim    MEDICATION:  Bactrim  ROUTE: PO  SITE: Orally  DOSE: 800/180mgD  LOT #: [id number]  : [employer]  EXPIRATION DATE: 05/20  NDC#: [address]-43   Was there drug waste? No    Prior to administration, verified patient identity using patient's name and date of birth.  Due to administration, patient instructed to remain in clinic for 15 minutes  afterwards, and to report any adverse reaction to me immediately.    Drug Amount Wasted:  None.  Vial/Syringe: Single dose vial      The  following medication was given:     MEDICATION:  Omnipaque (Iohexol Injection)  ROUTE: Provider Administered  SITE: Provider Administered via catheter  DOSE: 240mL  LOT #: [id number]  : [employer]  EXPIRATION DATE: 29/05/2022  NDC#: [address]-03   Was there drug waste? No    Prior to injection, verified patient identity using patient's name and date of birth.  Due to injection administration, patient instructed to remain in clinic for 15 minutes  afterwards, and to report any adverse reaction to me immediately.    Drug Amount Wasted:  None.  Vial/Syringe: Single dose vial    Sonya Foote comes into clinic today at the request of Dr. Hebert for Urodynamics.    Order has been verified: Yes.      The following medication was given: See Above    Removal:  14 Fr straight tipped latex harrell catheter removed from suprapubic meatus without difficulty after removing 08mL of fluid from the balloon.    Insertion:  14 Fr straight tipped latex straight catheter inserted into suprapubic meatus in the usual sterile fashion without difficulty.  Received 200 ml yellow urine output.     Patient did tolerate procedure well.    Patient instructed as to where to call or go for pain, fever, leakage, or decreased urine flow.     Patient instructed as to where to call or go for pain, fever, leakage, or decreased urine flow.     This service provided today was under the direct supervision of Melissa Mcnamara, who was available if needed.    Kunal Arriaga, EMT, EMT  10/28/2019  8:15 AM

## 2019-10-28 NOTE — PROGRESS NOTES
PREPROCEDURE DIAGNOSES:    1. Functional incontinence managed with SP tube  2. Urinary frequency per urethra  3. Recurrent UTIs    POSTPROCEDURE DIAGNOSES:  -Normal bladder capacity (334 mL) with somewhat heightened filling sensations.  -Good bladder compliance without DO/DOI.  -No EDISON.  -Good detrusor contraction during voiding to max Pdet 32 cm H2O.  -Moderate flow rate (Qmax 12 mL/s) with a fluctuating flow curve and complete bladder emptying (final PVR 34 mL).  -EMG quiet throughout the study  -BOOI does not suggest bladder outlet obstruction.  -Fluoroscopy reveals a mildly-trabeculated bladder wall with one presumed diverticulae at the superior aspect. Vesicoureteral reflux was observed on the left shortly after filling was initiated. This remained relatively constant throughout the study, never progressing beyond grade I. Very slight VUR was also noted midway through filling on the right, but this also never progressed beyond mild grade I. The bladder neck was closed during filling and somewhat closed, as well, during voiding.     PROCEDURE:    1. Sterile urethral catheterization for measurement of postvoid residual urine volume.  2. Complex filling cystometrogram with measurement of bladder and rectal pressures.  3. Complex voiding cystometrogram with measurement of bladder and rectal pressures.  4. Electromyography of the pelvic floor during urodynamics.  5. Fluoroscopic imaging of the bladder during urodynamics, at least 3 views.    6. Interpretation of urodynamics and flouroscopic imaging.      INDICATIONS FOR PROCEDURE:  Sonya Foote is a pleasant 69 year old female with functional incontinence managed with SP tube, urinary frequency per urethra, recurrent UTIs. Baseline video urodynamic assessment is requested today by Dr. Hebert to better characterize the patient's voiding dysfunction.      VOIDING DIARY:  Not completed.    DESCRIPTION OF PROCEDURE:  Risks, benefits, and alternatives to urodynamics were  discussed with the patient and she wished to proceed.  Urodynamics are planned to better assess the primary etiology for Ms. Foote's urologic dysfunction.  After informed consent was obtained, the patient was taken to the procedure room where the study was initiated. Findings below.     PRE-STUDY UROFLOWMETRY:  Postvoid residual by catheter: 15 mL.  Pretest urine dipstick was positive for small leukocytes and negative nitrites.    Next a 7F double-lumen urodynamics catheter was inserted into the bladder under sterile technique via the SP tract.  A 7F abdominal manometry catheter was placed in the rectum.  EMG pads were placed on both sides of the anal verge.  The bladder was filled with 200 mL of Omnipaque at 30 mL/minute and serial pressures were recorded.  With coughing there was an appropriate rise in vesical and abdominal pressures with no change in detrusor pressure, confirming good study catheter placement.    DURING THE FILLING PHASE:  First sensation: 142 mL.  First Desire: 224 mL.  Strong Desire: 235 mL.  Maximum Capacity: 334 mL.    Uninhibited detrusor contractions: None.  Compliance: Good. PDet=5 cmH20 at capacity. Compliance ratio of 66.  Continence: No DOI or EDISON.  EMG: Concordant during filling.    DURING THE VOIDING PHASE:  Maximum detrusor contraction with void: 32 cm of H2O pressure.  Voided volume: 299 mL.  Maximum flow rate: 12 mL/sec.  Average flow rate: 3.9 mL/sec.  Postvoid Residual: 34 mL.  EMG activity: Quiet.  Character of voiding curve: Fluctuating.  BOOI: -0.8 (suggesting no obstruction - see key below)  [obstructed (ANDERSON index [BOOI] ? 40); equivocal (no definite   obstruction; BOOI 20-40); and no obstruction (BOOI ? 20)]    FLUOROSCOPIC IMAGING OF THE BLADDER DURING URODYNAMICS:  Please note, image numbers on UDS tracings correlate with iSite series numbers on PACS images. Fluoroscopy during today's procedure demonstrated a mildly-trabeculated bladder wall with one presumed diverticulae  at the superior aspect. Vesicoureteral reflux was observed on the left shortly after filling was initiated. This remained relatively constant throughout the study, never progressing beyond grade I. Very slight VUR was also noted midway through filling on the right, but this also never progressed beyond mild grade I. The bladder neck was closed during filling and somewhat closed, as well, during voiding.  After voiding to completion, all catheters were removed and the patient was brought back into the consultation room to further discuss today's study results.      ASSESSMENT/PLAN:  Ms. Sonya Foote is a pleasant 69 year old female with functional incontinence managed with SP tube, ongoing urinary frequency per urethra, and recurrent UTIs who demonstrated the following findings today on urodynamic evaluation:    -Normal bladder capacity (334 mL) with somewhat heightened filling sensations.  -Good bladder compliance without DO/DOI.  -No EDISON.  -Good detrusor contraction during voiding to max Pdet 32 cm H2O.  -Moderate flow rate (Qmax 12 mL/s) with a fluctuating flow curve and complete bladder emptying (final PVR 34 mL).  -EMG quiet throughout the study  -BOOI does not suggest bladder outlet obstruction.  -Fluoroscopy reveals a mildly-trabeculated bladder wall with one presumed diverticulae at the superior aspect. Vesicoureteral reflux was observed on the left shortly after filling was initiated. This remained relatively constant throughout the study, never progressing beyond grade I. Very slight VUR was also noted midway through filling on the right, but this also never progressed beyond mild grade I. The bladder neck was closed during filling and somewhat closed, as well, during voiding.     The patient will follow up as scheduled with Dr. Hebert to further discuss today's study results and make plans for how best to proceed.      - A single Bactrim DS was provided for UTI prophylaxis following completion of today's  study per department protocol.  The risk of UTI with VUDS is low at ~2.5-3%.      Thank you for allowing me to participate in the care of Ms. Sonya Foote and please don't hesitate to contact me with any questions or concerns.      This procedure was performed under a collaborative agreement with Dr. Loki Gordon, Professor and  of Urology, [facility].    VICTOR M Barahona, CNP  Department of Urology

## 2019-10-28 NOTE — TELEPHONE ENCOUNTER
Patient called back to confirm surgery date and location change from 10/30/19 @ [facility] OR to 10/31/19 @ [facility] OR with Dr Gordon.

## 2019-10-30 ENCOUNTER — DOCUMENTATION ONLY (OUTPATIENT)
Dept: SLEEP MEDICINE | Facility: CLINIC | Age: 69
End: 2019-10-30

## 2019-10-30 DIAGNOSIS — G47.39 COMPLEX SLEEP APNEA SYNDROME: ICD-10-CM

## 2019-10-30 DIAGNOSIS — G47.33 OSA (OBSTRUCTIVE SLEEP APNEA): ICD-10-CM

## 2019-10-30 NOTE — Clinical Note
FYI-has an appointment on Monday, not sure what can be done.  AHI is randomly elevated as well, could be due to leak?

## 2019-10-31 ENCOUNTER — ANESTHESIA EVENT (OUTPATIENT)
Dept: SURGERY | Facility: CLINIC | Age: 69
End: 2019-10-31
Payer: COMMERCIAL

## 2019-10-31 ENCOUNTER — HOSPITAL ENCOUNTER (OUTPATIENT)
Facility: CLINIC | Age: 69
Discharge: NURSING, REHAB OR LONG-TERM CARE | End: 2019-10-31
Attending: UROLOGY | Admitting: UROLOGY
Payer: COMMERCIAL

## 2019-10-31 ENCOUNTER — ANESTHESIA (OUTPATIENT)
Dept: SURGERY | Facility: CLINIC | Age: 69
End: 2019-10-31
Payer: COMMERCIAL

## 2019-10-31 VITALS
OXYGEN SATURATION: 99 % | TEMPERATURE: 98.4 F | DIASTOLIC BLOOD PRESSURE: 73 MMHG | SYSTOLIC BLOOD PRESSURE: 146 MMHG | RESPIRATION RATE: 18 BRPM | HEART RATE: 76 BPM

## 2019-10-31 DIAGNOSIS — N31.8 OTHER NEUROMUSCULAR DYSFUNCTION OF BLADDER: ICD-10-CM

## 2019-10-31 PROBLEM — K21.9 ESOPHAGEAL REFLUX: Status: ACTIVE | Noted: 2019-10-31

## 2019-10-31 PROBLEM — I25.118 CORONARY ARTERY DISEASE OF NATIVE ARTERY OF NATIVE HEART WITH STABLE ANGINA PECTORIS (H): Chronic | Status: ACTIVE | Noted: 2017-04-04

## 2019-10-31 PROBLEM — Z86.73 HISTORY OF CVA (CEREBROVASCULAR ACCIDENT): Chronic | Status: ACTIVE | Noted: 2018-06-25

## 2019-10-31 PROBLEM — I25.5 ISCHEMIC CARDIOMYOPATHY: Status: RESOLVED | Noted: 2017-04-04 | Resolved: 2019-10-31

## 2019-10-31 PROBLEM — Z79.899 POLYPHARMACY: Status: ACTIVE | Noted: 2019-10-31

## 2019-10-31 PROBLEM — Z86.73 HISTORY OF CVA (CEREBROVASCULAR ACCIDENT): Status: ACTIVE | Noted: 2018-06-25

## 2019-10-31 PROBLEM — I25.10 CORONARY ARTERY DISEASE INVOLVING NATIVE CORONARY ARTERY OF NATIVE HEART: Chronic | Status: ACTIVE | Noted: 2017-04-04

## 2019-10-31 PROBLEM — G47.33 OSA (OBSTRUCTIVE SLEEP APNEA): Chronic | Status: ACTIVE | Noted: 2017-02-22

## 2019-10-31 PROBLEM — G43.909 MIGRAINE HEADACHE: Status: ACTIVE | Noted: 2018-03-20

## 2019-10-31 LAB
CREAT SERPL-MCNC: 0.61 MG/DL (ref 0.52–1.04)
GFR SERPL CREATININE-BSD FRML MDRD: >90 ML/MIN/{1.73_M2}
GLUCOSE BLDC GLUCOMTR-MCNC: 105 MG/DL (ref 70–99)
GLUCOSE BLDC GLUCOMTR-MCNC: 96 MG/DL (ref 70–99)
POTASSIUM SERPL-SCNC: 4.1 MMOL/L (ref 3.4–5.3)

## 2019-10-31 PROCEDURE — 40000556 ZZH STATISTIC PERIPHERAL IV START W US GUIDANCE

## 2019-10-31 PROCEDURE — 25000128 H RX IP 250 OP 636: Performed by: NURSE ANESTHETIST, CERTIFIED REGISTERED

## 2019-10-31 PROCEDURE — 36000051 ZZH SURGERY LEVEL 2 1ST 30 MIN - UMMC: Performed by: UROLOGY

## 2019-10-31 PROCEDURE — 25000128 H RX IP 250 OP 636: Performed by: UROLOGY

## 2019-10-31 PROCEDURE — 27210794 ZZH OR GENERAL SUPPLY STERILE: Performed by: UROLOGY

## 2019-10-31 PROCEDURE — 25800025 ZZH RX 258: Performed by: UROLOGY

## 2019-10-31 PROCEDURE — 37000009 ZZH ANESTHESIA TECHNICAL FEE, EACH ADDTL 15 MIN: Performed by: UROLOGY

## 2019-10-31 PROCEDURE — 36000053 ZZH SURGERY LEVEL 2 EA 15 ADDTL MIN - UMMC: Performed by: UROLOGY

## 2019-10-31 PROCEDURE — 82962 GLUCOSE BLOOD TEST: CPT

## 2019-10-31 PROCEDURE — 25800030 ZZH RX IP 258 OP 636: Performed by: NURSE ANESTHETIST, CERTIFIED REGISTERED

## 2019-10-31 PROCEDURE — 71000027 ZZH RECOVERY PHASE 2 EACH 15 MINS: Performed by: UROLOGY

## 2019-10-31 PROCEDURE — 84132 ASSAY OF SERUM POTASSIUM: CPT | Performed by: ANESTHESIOLOGY

## 2019-10-31 PROCEDURE — 82565 ASSAY OF CREATININE: CPT | Performed by: ANESTHESIOLOGY

## 2019-10-31 PROCEDURE — 40000170 ZZH STATISTIC PRE-PROCEDURE ASSESSMENT II: Performed by: UROLOGY

## 2019-10-31 PROCEDURE — 36415 COLL VENOUS BLD VENIPUNCTURE: CPT | Performed by: ANESTHESIOLOGY

## 2019-10-31 PROCEDURE — 25000125 ZZHC RX 250: Performed by: ANESTHESIOLOGY

## 2019-10-31 PROCEDURE — 37000008 ZZH ANESTHESIA TECHNICAL FEE, 1ST 30 MIN: Performed by: UROLOGY

## 2019-10-31 PROCEDURE — 25000576 ZZH RX IP 250 OP 636 J0585: Performed by: UROLOGY

## 2019-10-31 RX ORDER — SODIUM CHLORIDE, SODIUM LACTATE, POTASSIUM CHLORIDE, CALCIUM CHLORIDE 600; 310; 30; 20 MG/100ML; MG/100ML; MG/100ML; MG/100ML
INJECTION, SOLUTION INTRAVENOUS CONTINUOUS
Status: CANCELLED | OUTPATIENT
Start: 2019-10-31

## 2019-10-31 RX ORDER — SODIUM CHLORIDE, SODIUM LACTATE, POTASSIUM CHLORIDE, CALCIUM CHLORIDE 600; 310; 30; 20 MG/100ML; MG/100ML; MG/100ML; MG/100ML
INJECTION, SOLUTION INTRAVENOUS CONTINUOUS PRN
Status: DISCONTINUED | OUTPATIENT
Start: 2019-10-31 | End: 2019-10-31

## 2019-10-31 RX ORDER — PROPOFOL 10 MG/ML
INJECTION, EMULSION INTRAVENOUS PRN
Status: DISCONTINUED | OUTPATIENT
Start: 2019-10-31 | End: 2019-10-31

## 2019-10-31 RX ORDER — SODIUM CHLORIDE, SODIUM LACTATE, POTASSIUM CHLORIDE, CALCIUM CHLORIDE 600; 310; 30; 20 MG/100ML; MG/100ML; MG/100ML; MG/100ML
INJECTION, SOLUTION INTRAVENOUS CONTINUOUS
Status: DISCONTINUED | OUTPATIENT
Start: 2019-10-31 | End: 2019-10-31 | Stop reason: HOSPADM

## 2019-10-31 RX ORDER — DEXAMETHASONE SODIUM PHOSPHATE 4 MG/ML
INJECTION, SOLUTION INTRA-ARTICULAR; INTRALESIONAL; INTRAMUSCULAR; INTRAVENOUS; SOFT TISSUE PRN
Status: DISCONTINUED | OUTPATIENT
Start: 2019-10-31 | End: 2019-10-31

## 2019-10-31 RX ORDER — CIPROFLOXACIN 2 MG/ML
400 INJECTION, SOLUTION INTRAVENOUS
Status: COMPLETED | OUTPATIENT
Start: 2019-10-31 | End: 2019-10-31

## 2019-10-31 RX ORDER — ACETAMINOPHEN 325 MG/1
975 TABLET ORAL ONCE
Status: CANCELLED | OUTPATIENT
Start: 2019-10-31 | End: 2019-10-31

## 2019-10-31 RX ORDER — MEPERIDINE HYDROCHLORIDE 25 MG/ML
12.5 INJECTION INTRAMUSCULAR; INTRAVENOUS; SUBCUTANEOUS
Status: CANCELLED | OUTPATIENT
Start: 2019-10-31

## 2019-10-31 RX ORDER — PROPOFOL 10 MG/ML
INJECTION, EMULSION INTRAVENOUS CONTINUOUS PRN
Status: DISCONTINUED | OUTPATIENT
Start: 2019-10-31 | End: 2019-10-31

## 2019-10-31 RX ORDER — OXYCODONE HYDROCHLORIDE 5 MG/1
5 TABLET ORAL EVERY 4 HOURS PRN
Status: CANCELLED | OUTPATIENT
Start: 2019-10-31

## 2019-10-31 RX ORDER — FENTANYL CITRATE 50 UG/ML
INJECTION, SOLUTION INTRAMUSCULAR; INTRAVENOUS PRN
Status: DISCONTINUED | OUTPATIENT
Start: 2019-10-31 | End: 2019-10-31

## 2019-10-31 RX ORDER — ONDANSETRON 2 MG/ML
4 INJECTION INTRAMUSCULAR; INTRAVENOUS EVERY 30 MIN PRN
Status: CANCELLED | OUTPATIENT
Start: 2019-10-31

## 2019-10-31 RX ORDER — ONDANSETRON 2 MG/ML
INJECTION INTRAMUSCULAR; INTRAVENOUS PRN
Status: DISCONTINUED | OUTPATIENT
Start: 2019-10-31 | End: 2019-10-31

## 2019-10-31 RX ORDER — ONDANSETRON 4 MG/1
4 TABLET, ORALLY DISINTEGRATING ORAL EVERY 30 MIN PRN
Status: CANCELLED | OUTPATIENT
Start: 2019-10-31

## 2019-10-31 RX ORDER — FENTANYL CITRATE 50 UG/ML
25-50 INJECTION, SOLUTION INTRAMUSCULAR; INTRAVENOUS EVERY 5 MIN PRN
Status: CANCELLED | OUTPATIENT
Start: 2019-10-31

## 2019-10-31 RX ORDER — NALOXONE HYDROCHLORIDE 0.4 MG/ML
.1-.4 INJECTION, SOLUTION INTRAMUSCULAR; INTRAVENOUS; SUBCUTANEOUS
Status: CANCELLED | OUTPATIENT
Start: 2019-10-31 | End: 2019-11-01

## 2019-10-31 RX ORDER — IPRATROPIUM BROMIDE AND ALBUTEROL SULFATE 2.5; .5 MG/3ML; MG/3ML
3 SOLUTION RESPIRATORY (INHALATION) ONCE
Status: COMPLETED | OUTPATIENT
Start: 2019-10-31 | End: 2019-10-31

## 2019-10-31 RX ADMIN — CIPROFLOXACIN 400 MG: 2 INJECTION, SOLUTION INTRAVENOUS at 11:53

## 2019-10-31 RX ADMIN — SODIUM CHLORIDE, POTASSIUM CHLORIDE, SODIUM LACTATE AND CALCIUM CHLORIDE: 600; 310; 30; 20 INJECTION, SOLUTION INTRAVENOUS at 11:45

## 2019-10-31 RX ADMIN — PROPOFOL 40 MG: 10 INJECTION, EMULSION INTRAVENOUS at 12:40

## 2019-10-31 RX ADMIN — PROPOFOL 50 MCG/KG/MIN: 10 INJECTION, EMULSION INTRAVENOUS at 11:47

## 2019-10-31 RX ADMIN — FENTANYL CITRATE 25 MCG: 50 INJECTION, SOLUTION INTRAMUSCULAR; INTRAVENOUS at 11:46

## 2019-10-31 RX ADMIN — IPRATROPIUM BROMIDE AND ALBUTEROL SULFATE 3 ML: .5; 3 SOLUTION RESPIRATORY (INHALATION) at 10:36

## 2019-10-31 RX ADMIN — ONDANSETRON 4 MG: 2 INJECTION INTRAMUSCULAR; INTRAVENOUS at 12:28

## 2019-10-31 RX ADMIN — DEXAMETHASONE SODIUM PHOSPHATE 4 MG: 4 INJECTION, SOLUTION INTRA-ARTICULAR; INTRALESIONAL; INTRAMUSCULAR; INTRAVENOUS; SOFT TISSUE at 12:28

## 2019-10-31 RX ADMIN — FENTANYL CITRATE 75 MCG: 50 INJECTION, SOLUTION INTRAMUSCULAR; INTRAVENOUS at 12:24

## 2019-10-31 ASSESSMENT — LIFESTYLE VARIABLES: TOBACCO_USE: 1

## 2019-10-31 ASSESSMENT — ENCOUNTER SYMPTOMS
SEIZURES: 1
ORTHOPNEA: 0

## 2019-10-31 ASSESSMENT — COPD QUESTIONNAIRES
CAT_SEVERITY: MODERATE
COPD: 1

## 2019-10-31 NOTE — ANESTHESIA PREPROCEDURE EVALUATION
Anesthesia Pre-Procedure Evaluation    Patient: Sonya Foote   MRN:     [id number] Gender:   female   Age:    69 year old :      1950        Preoperative Diagnosis: Other neuromuscular dysfunction of bladder [N31.8]   Procedure(s):  CYSTOSCOPY, BOTOX INJECTION     Past Medical History:   Diagnosis Date     Anemia      Antiplatelet or antithrombotic long-term use      Arthritis      AS (sickle cell trait) (H) 10/8/2013     Blind left eye      BPPV (benign paroxysmal positional vertigo)      Chronic back pain      COPD (chronic obstructive pulmonary disease) (H)      Coronary artery disease      CVA (cerebral infarction) 10/8/2012    x3, residual left sided weakness     Diastolic CHF (H) 2012     Dilantin toxicity 2013     Esophageal candidiasis (H)      GERD (gastroesophageal reflux disease)      Heart attack (H)      Hernia, abdominal      History of blood transfusion     x5     History of thrombophlebitis      HTN (hypertension) 2012     Hyperlipidemia LDL goal <100 2012     Legally blind in right eye, as defined in [place]     No periphal vision right eye     Osteoporosis 2013     Other chronic pain      PAD (peripheral artery disease) (H)      Palpitations      Person who has had sex change operation 2014     Pneumonia      Schizoaffective disorder (H)      Seizure disorder (H) 2012     Sleep apnea     CPAP     Thrombosis of leg      Type 2 diabetes, HbA1C goal < 8% (H) 2012     Uncomplicated asthma      Unspecified cerebral artery occlusion with cerebral infarction       Past Surgical History:   Procedure Laterality Date     AMPUTATE LEG ABOVE KNEE Left 2016    Procedure: AMPUTATE LEG ABOVE KNEE;  Surgeon: eMllo Rodriguez MD;  [facility]     AMPUTATE LEG BELOW KNEE Right 2016    Procedure: AMPUTATE LEG BELOW KNEE;  Surgeon: Savannah Durant MD;  [facility]     AMPUTATE REVISION STUMP LOWER EXTREMITY Right 2016    Procedure: AMPUTATE REVISION STUMP  LOWER EXTREMITY;  Surgeon: Savannah Durant MD;  [facility]     AMPUTATE REVISION STUMP LOWER EXTREMITY Right 11/16/2016    Procedure: AMPUTATE REVISION STUMP LOWER EXTREMITY;  Surgeon: Savannah Durant MD;  [facility]     AMPUTATE TOE(S) Right 1/5/2016    Procedure: AMPUTATE TOE(S);  Surgeon: Mello Gaines DPM;  Location: [facility]     ANGIOGRAM Bilateral 11/21/2014    Procedure: ANGIOGRAM;  Surgeon: Savannah Durant MD;  [facility]     ANGIOGRAM Left 1/16/2015    Procedure: ANGIOGRAM;  Surgeon: Savannah Durant MD;  [facility]     ANGIOGRAM Bilateral 9/14/2015    Procedure: ANGIOGRAM;  Surgeon: Savannah Durant MD;  [facility]     ANGIOGRAM Left 10/12/2015    Procedure: ANGIOGRAM;  Surgeon: Savannah Durant MD;  [facility]     ANGIOGRAM Right 6/6/2016    Procedure: ANGIOGRAM;  Surgeon: Savannah Durant MD;  [facility]     ANGIOPLASTY Right 6/6/2016    Procedure: ANGIOPLASTY;  Surgeon: Savannah Durant MD;  [facility]     APPENDECTOMY       BREAST SURGERY      right breast bx (benign)     CATARACT IOL, RT/LT Right      CHOLECYSTECTOMY       COLONOSCOPY N/A 8/25/2014    Procedure: COLONOSCOPY;  Surgeon: Mello Ferrer MD;  [facility]     COLONOSCOPY WITH CO2 INSUFFLATION N/A 8/20/2014    Procedure: COLONOSCOPY WITH CO2 INSUFFLATION;  Surgeon: Duane, William Charles, MD;  Location: [facility]     CYSTOSTOMY, INSERT TUBE SUPRAPUBIC, COMBINED N/A 1/16/2018    Procedure: COMBINED CYSTOSTOMY, INSERT TUBE SUPRAPUBIC;  Cystoscopy, Intraoperative Ultrasound, Suprapubic Tube Placement;  Surgeon: Keanu Dawson MD;  [facility]     ENDARTERECTOMY FEMORAL  5/23/2014    Procedure: ENDARTERECTOMY FEMORAL;  Surgeon: Jason Joshi MD;  [facility]     ESOPHAGOSCOPY, GASTROSCOPY, DUODENOSCOPY (EGD), COMBINED  12/14/2012    Procedure: COMBINED ESOPHAGOSCOPY, GASTROSCOPY, DUODENOSCOPY (EGD), BIOPSY SINGLE OR MULTIPLE;  ESOPHAGOSCOPY, GASTROSCOPY, DUODENOSCOPY (EGD), DILATATION ;  Surgeon:  Elizabeth Stevenson MD;  Location:  GI     ESOPHAGOSCOPY, GASTROSCOPY, DUODENOSCOPY (EGD), COMBINED  12/31/2013    Procedure: COMBINED ESOPHAGOSCOPY, GASTROSCOPY, DUODENOSCOPY (EGD), BIOPSY SINGLE OR MULTIPLE;;  Surgeon: Clemente Lopez MD;  Location:  GI     ESOPHAGOSCOPY, GASTROSCOPY, DUODENOSCOPY (EGD), COMBINED  4/1/2014    Procedure: COMBINED ESOPHAGOSCOPY, GASTROSCOPY, DUODENOSCOPY (EGD);;  Surgeon: Clemente Lopez MD;  Location:  GI     ESOPHAGOSCOPY, GASTROSCOPY, DUODENOSCOPY (EGD), COMBINED  6/28/2014    Procedure: COMBINED ESOPHAGOSCOPY, GASTROSCOPY, DUODENOSCOPY (EGD);  Surgeon: Clemente Lopez MD;  Location:  GI     ESOPHAGOSCOPY, GASTROSCOPY, DUODENOSCOPY (EGD), COMBINED N/A 8/20/2014    Procedure: COMBINED ESOPHAGOSCOPY, GASTROSCOPY, DUODENOSCOPY (EGD), BIOPSY SINGLE OR MULTIPLE;  Surgeon: Duane, William Charles, MD;  Location: [facility]     ESOPHAGOSCOPY, GASTROSCOPY, DUODENOSCOPY (EGD), COMBINED N/A 8/22/2014    Procedure: COMBINED ESOPHAGOSCOPY, GASTROSCOPY, DUODENOSCOPY (EGD), BIOPSY SINGLE OR MULTIPLE;  Surgeon: Mello Ferrer MD;  Location:  GI     ESOPHAGOSCOPY, GASTROSCOPY, DUODENOSCOPY (EGD), COMBINED N/A 10/2/2014    Procedure: COMBINED ESOPHAGOSCOPY, GASTROSCOPY, DUODENOSCOPY (EGD), BIOPSY SINGLE OR MULTIPLE;  Surgeon: Remy Haskins MD;  Location:  GI     ESOPHAGOSCOPY, GASTROSCOPY, DUODENOSCOPY (EGD), COMBINED Left 12/15/2014    Procedure: COMBINED ESOPHAGOSCOPY, GASTROSCOPY, DUODENOSCOPY (EGD), BIOPSY SINGLE OR MULTIPLE;  Surgeon: Remy Haskins MD;  Location:  GI     ESOPHAGOSCOPY, GASTROSCOPY, DUODENOSCOPY (EGD), COMBINED N/A 2/25/2015    Procedure: COMBINED ENDOSCOPIC ULTRASOUND, ESOPHAGOSCOPY, GASTROSCOPY, DUODENOSCOPY (EGD), FINE NEEDLE ASPIRATE/BIOPSY;  Surgeon: Clemente Lugo MD;  Location:  GI     ESOPHAGOSCOPY, GASTROSCOPY, DUODENOSCOPY (EGD), COMBINED Left 2/25/2015    Procedure: COMBINED ESOPHAGOSCOPY, GASTROSCOPY, DUODENOSCOPY (EGD), BIOPSY  "SINGLE OR MULTIPLE;  Surgeon: Clemente Lugo MD;  [facility]     ESOPHAGOSCOPY, GASTROSCOPY, DUODENOSCOPY (EGD), COMBINED N/A 9/25/2016    Procedure: COMBINED ESOPHAGOSCOPY, GASTROSCOPY, DUODENOSCOPY (EGD);  Surgeon: Aziza Patiño MD;  [facility]     ESOPHAGOSCOPY, GASTROSCOPY, DUODENOSCOPY (EGD), COMBINED N/A 1/18/2017    Procedure: COMBINED ESOPHAGOSCOPY, GASTROSCOPY, DUODENOSCOPY (EGD), BIOPSY SINGLE OR MULTIPLE;  Surgeon: Clemente Lopez MD;  [facility]     ESOPHAGOSCOPY, GASTROSCOPY, DUODENOSCOPY (EGD), COMBINED N/A 11/26/2017    Procedure: COMBINED ESOPHAGOSCOPY, GASTROSCOPY, DUODENOSCOPY (EGD), REMOVE FOREIGN BODY;  Esophagogastroduodenoscopy with foreign body extraction  ;  Surgeon: Herberth Castrejon MD;  [facility]     ESOPHAGOSCOPY, GASTROSCOPY, DUODENOSCOPY (EGD), COMBINED N/A 11/26/2017    Procedure: COMBINED ESOPHAGOSCOPY, GASTROSCOPY, DUODENOSCOPY (EGD), REMOVE FOREIGN BODY;;  Surgeon: Herberth Castrejon MD;  [facility]     FASCIOTOMY LOWER EXTREMITY Left 6/10/2016    Procedure: FASCIOTOMY LOWER EXTREMITY;  Surgeon: Mello Rodriguez MD;  [facility]     HC CAPSULE ENDOSCOPY N/A 8/25/2014    Procedure: CAPSULE/PILL CAM ENDOSCOPY;  Surgeon: Remy Haskins MD;  [facility]     HC CAPSULE ENDOSCOPY N/A 10/2/2014    Procedure: CAPSULE/PILL CAM ENDOSCOPY;  Surgeon: Remy Haskins MD;  [facility]     ORTHOPEDIC SURGERY      broken wrist repair     SEX TRANSFORMATION SURGERY, MALE TO FEMALE      1974     SINUS SURGERY      cyst removed     TONSILLECTOMY       VASCULAR SURGERY      Left carotid stent          Anesthesia Evaluation     . Pt has had prior anesthetic. Type: General, MAC and Regional    No history of anesthetic complications          ROS/MED HX    ENT/Pulmonary: Comment: [place] - uses \"Pocket talker\" to enhance hearing.      (+)sleep apnea, allergic rhinitis, vocal cord abnormalities- Horseness, tobacco use (Quit smoking cigarettes " "16 years ago.  Smoked 1-2 PPD for 30 years.  Now vaping only.), Current use vaping only packs/day  Moderate Persistent asthma Last exacerbation: > 1year ago,Treatment: Inhaled steroids, Nebulizer daily and Inhaler prn,  moderate COPD, O2 dependent, during Nighttime 2L with CPAP liters/min,  uses CPAP on Oxygen 2L at night cmH2O , . .   : Wears corrective lenses.  Cannot see out of left eye.  Limited right eye vision with no peripheral vision. Legally blind.    Neurologic:     (+)neuropathy - hands, seizures last seizure:  [place] 4-5  time per day.  [place] - last 2005. features: \"stops talking and forgets what was said\", CVA (Cerebral vascular dz,s/p stent to left carotid artery 2005.) date: 2005, 2007 & 2008 with deficits-  left side weakness, peripheral vision loss, dysphagia   , other neuro Legally blind (no peripheral vision)  RLS. BPPV.  glaucoma. poor memory    Cardiovascular: Comment: PVD and thrombosis of LLE, s/p left AKA 6/6/2016   PVD RLE, s/p right AKA 11/23/2016.  Left carotid stent.  Carotid US 10/2017: < 50% bilat stenosis.       (+) Dyslipidemia, hypertension-Peripheral Vascular Disease-- Carotid Stenosis, CAD, -past MI (2005 and Inferior MI 9/2016.),-stent (s/p COLLEEN to pRCA and mRCA),9/2016   2 Drug Eluting Stent .. Taking blood thinners Pt has received instructions: Instructions Given to patient: stay on ASA. CHF (Plavix started September 2016 post stent placement.   ASA daily.) etiology: diastolic heart failure Last EF: 80%  date: 10/2017 . BEDOYA, . :. . Previous cardiac testing Echodate:12/22/2016results:Interpretation Summary  Left ventricular size is normal.  The Ejection Fraction is estimated at 35-40%.  Inferior wall akinesis is present.  Septal,basal to mid anterior wall akinesis.  Right ventricular function, chamber size, wall motion, and thickness are  normal.  Compared to prior study,the wallmotion abnormality is new.Stress Testdate:10/2/2017 results:Impression  1.  Myocardial perfusion " imaging using single isotope technique  demonstrated a small, basal inferior nontransmural infarction and a  very small area of mild ischemia involving the distal inferolateral  wall and apex.   2. Gated images demonstrated mild basal inferior hypokinesis.  The  left ventricular systolic function is normal, with an ejection  fraction measured at 80%.  3. No previous study available for comparisonECG reviewed date:12/5/2017 results:Sinus bradycardiaCath date: 9/17/2016 results:  Diagnosis  1.                   1-vessel coronary artery disease (RCA), without left main lesion.  2.                   Successful angioplasty (COLLEEN) of the proximal RCA.  3.                   Successful angioplasty (COLLEEN) of the mid RCA.         (-) angina, orthopnea/PND and angina   METS/Exercise Tolerance: Comment: Patient lives in assisted living.  Is able to dress herself, but does need help with showering. 1 - Eating, dressing   Hematologic:     (+) History of blood clots (Left LE  prior to AKA.) pt is not anticoagulated, Anemia, History of Transfusion no previous transfusion reaction Other Hematologic Disorder-Sickle cell trait      Musculoskeletal:   (+) arthritis (hands),  other musculoskeletal- DDD, chronic low back pain.  intrathecal pump in place.        GI/Hepatic: Comment: Chronic dysphagia   Esophageal strictures and previous dilations.    (+) GERD Asymptomatic on medication, Other GI/Hepatic gastroparesis     Acute appendicitis: s/p appendectomy. Cholecystitis/cholelithiasis: s/p cholecystectomy.   Renal/Genitourinary:     (+) Other Renal/ Genitourinary, Frequent UTI's.  Functional incontinence - wears depends.      Endo:     (+) type II DM Last HgA1c: 5.1 date: 11/10/2017 Not using insulin - not using insulin pump Normal glucose range: 140-200 not previously admitted for DM/DKA Diabetic complications: neuropathy gastroparesis cardiac problems, .      Psychiatric:     (+) psychiatric history anxiety, depression and other  (comment) (Schizoaffective disorder)      Infectious Disease:  - neg infectious disease ROS       Malignancy:      - no malignancy   Other: Comment: Chronic pain from neuropathy and PVD  Intrathecal pump with bupivacaine and fentanyl located in right lower back.   (+) No chance of pregnancy C-spine cleared: N/A, H/O Chronic Pain,H/O chronic opiod use , other significant disability Wheelchair bound and Blind                       PHYSICAL EXAM:   Mental Status/Neuro:    Airway: Facies: Challenging  Mallampati: III  Mouth/Opening: Limited  TM distance: > 6 cm  Neck ROM: Full   Respiratory: Auscultation: Decreased BS      CV: Rhythm: Regular  Rate: Age appropriate  Heart: Normal Sounds  Edema: None   Comments:                      LABS:  CBC:   Lab Results   Component Value Date    WBC 12.1 (H) 09/17/2019    WBC 9.7 08/05/2019    HGB 11.5 (L) 10/23/2019    HGB 11.1 (L) 09/17/2019    HCT 34.3 (L) 09/17/2019    HCT 36.7 08/05/2019     10/23/2019     09/17/2019     BMP:   Lab Results   Component Value Date     09/17/2019     06/19/2019    POTASSIUM 4.1 10/31/2019    POTASSIUM 4.2 10/23/2019    CHLORIDE 107 09/17/2019    CHLORIDE 106 06/19/2019    CO2 26 09/17/2019    CO2 27 06/19/2019    BUN 9 09/17/2019    BUN 15 06/19/2019    CR 0.61 10/31/2019    CR 0.48 (L) 09/17/2019    GLC 78 10/23/2019    GLC 78 09/17/2019     COAGS:   Lab Results   Component Value Date    PTT 32 12/25/2016    INR 1.10 06/14/2019    FIBR 786 (H) 06/17/2016     POC:   Lab Results   Component Value Date    BGM 96 10/31/2019     OTHER:   Lab Results   Component Value Date    PH 7.40 09/14/2015    LACT 1.0 09/17/2019    A1C 5.0 04/17/2019    CAROL 8.7 09/17/2019    PHOS 3.2 10/08/2017    MAG 1.9 05/02/2018    ALBUMIN 2.9 (L) 09/17/2019    PROTTOTAL 7.6 09/17/2019    ALT 68 (H) 09/17/2019    AST 50 (H) 09/17/2019    ALKPHOS 205 (H) 09/17/2019    BILITOTAL <0.1 (L) 09/17/2019    LIPASE 129 07/04/2018    ALEX 32 08/02/2016    TSH  "1.52 04/17/2019    T4 0.88 05/17/2013    .0 (H) 12/25/2016    SED 72 (H) 12/01/2015        Preop Vitals    BP Readings from Last 3 Encounters:   10/31/19 101/57   10/28/19 122/71   10/23/19 96/50    Pulse Readings from Last 3 Encounters:   10/31/19 78   10/28/19 90   10/23/19 83      Resp Readings from Last 3 Encounters:   10/31/19 16   10/23/19 16   09/19/19 22    SpO2 Readings from Last 3 Encounters:   10/31/19 95%   10/23/19 97%   09/19/19 98%      Temp Readings from Last 1 Encounters:   10/31/19 36.9  C (98.5  F) (Oral)    Ht Readings from Last 1 Encounters:   09/17/19 1.092 m (3' 7\")      Wt Readings from Last 1 Encounters:   10/28/19 59 kg (130 lb)    Estimated body mass index is 49.43 kg/m  as calculated from the following:    Height as of 9/17/19: 1.092 m (3' 7\").    Weight as of 10/28/19: 59 kg (130 lb).     LDA:  Peripheral IV 06/14/19 Right;Anterior Upper forearm (Active)   Number of days: 139       Suprapubic Catheter Non-latex (Active)   Number of days: 557        Assessment:   ASA SCORE: 3    H&P: History and physical reviewed and following examination; no interval change.    NPO Status: NPO Appropriate     Plan:   Anes. Type:  General; MAC   Pre-Medication: None   Induction:  N/a   Airway: Native Airway   Access/Monitoring: PIV   Maintenance: Propofol Sedation     Postop Plan:   Postop Pain: None  Postop Sedation/Airway: Not planned  Disposition: Outpatient     PONV Management: Adult Risk Factors: Female   Prevention: Ondansetron, Propofol     CONSENT: Direct conversation   Plan and risks discussed with: Patient   Blood Products: Consent Deferred (Minimal Blood Loss)                   Sintia Blevins MD  "

## 2019-10-31 NOTE — DISCHARGE INSTRUCTIONS
[facility]  Same-Day Surgery   Adult Discharge Orders & Instructions     For 24 hours after surgery    1. Get plenty of rest.  A responsible adult must stay with you for at least 24 hours after you leave the hospital.   2. Do not drive or use heavy equipment.  If you have weakness or tingling, don't drive or use heavy equipment until this feeling goes away.  3. Do not drink alcohol.  4. Avoid strenuous or risky activities.  Ask for help when climbing stairs.   5. You may feel lightheaded.  IF so, sit for a few minutes before standing.  Have someone help you get up.   6. If you have nausea (feel sick to your stomach): Drink only clear liquids such as apple juice, ginger ale, broth or 7-Up.  Rest may also help.  Be sure to drink enough fluids.  Move to a regular diet as you feel able.  7. You may have a slight fever. Call the doctor if your fever is over 100 F (37.7 C) (taken under the tongue) or lasts longer than 24 hours.  8. You may have a dry mouth, a sore throat, muscle aches or trouble sleeping.  These should go away after 24 hours.  9. Do not make important or legal decisions.   Call your doctor for any of the followin.  Signs of infection (fever, growing tenderness at the surgery site, a large amount of drainage or bleeding, severe pain, foul-smelling drainage, redness, swelling).    2. It has been over 8 to 10 hours since surgery and you are still not able to urinate (pass water).    3.  Headache for over 24 hours.    4.  Numbness, tingling or weakness the day after surgery (if you had spinal anesthesia).  To contact a doctor, call Dr Jauregui's office at [phone] (clinic)  or:        [phone] and ask for the resident on call for   ______________________________________________ (answered 24 hours a day)      Emergency Department:    [facility]: [phone]       (TTY for hearing impaired: [phone])

## 2019-10-31 NOTE — ANESTHESIA CARE TRANSFER NOTE
Patient: Sonya Green    Procedure(s):  CYSTOSCOPY, BOTOX INJECTION, Suprapubic Catheter Exchange    Diagnosis: Other neuromuscular dysfunction of bladder [N31.8]  Diagnosis Additional Information: No value filed.    Anesthesia Type:   General, MAC     Note:  Airway :Room Air  Patient transferred to:Phase II  Handoff Report: Identifed the Patient, Identified the Reponsible Provider, Reviewed the pertinent medical history, Discussed the surgical course, Reviewed Intra-OP anesthesia mangement and issues during anesthesia, Set expectations for post-procedure period and Allowed opportunity for questions and acknowledgement of understanding      Vitals: (Last set prior to Anesthesia Care Transfer)    CRNA VITALS  10/31/2019 1219 - 10/31/2019 1319      10/31/2019             Pulse:  88    SpO2:  100 %                Electronically Signed By: VICTOR M Way CRNA  October 31, 2019  1:19 PM

## 2019-10-31 NOTE — ANESTHESIA POSTPROCEDURE EVALUATION
Anesthesia POST Procedure Evaluation    Patient: Sonya Foote   MRN:     [id number] Gender:   female   Age:    69 year old :      1950        Preoperative Diagnosis: Other neuromuscular dysfunction of bladder [N31.8]   Procedure(s):  CYSTOSCOPY, BOTOX INJECTION, Suprapubic Catheter Exchange   Postop Comments: No value filed.       Anesthesia Type:  Not documented  General, MAC    Reportable Event: NO     PAIN: Uncomplicated   Sign Out status: Comfortable, Well controlled pain     PONV: No PONV   Sign Out status:  No Nausea or Vomiting     Neuro/Psych: Uneventful perioperative course   Sign Out Status: Preoperative baseline; Age appropriate mentation     Airway/Resp.: Uneventful perioperative course   Sign Out Status: Non labored breathing, age appropriate RR; Resp. Status within EXPECTED Parameters     CV: Uneventful perioperative course   Sign Out status: Appropriate BP and perfusion indices; Appropriate HR/Rhythm     Disposition:   Sign Out in:  Phase II  Disposition:  Home  Recovery Course: Uneventful  Follow-Up: Not required           Last Anesthesia Record Vitals:  CRNA VITALS  10/31/2019 1219 - 10/31/2019 1319      10/31/2019             Pulse:  88    SpO2:  100 %          Last PACU Vitals:  Vitals Value Taken Time   /54 10/31/2019 12:54 PM   Temp 36.9  C (98.4  F) 10/31/2019 12:54 PM   Pulse 76 10/31/2019 12:54 PM   Resp 16 10/31/2019 12:54 PM   SpO2 99 % 10/31/2019 12:54 PM   Temp src     NIBP 96/63 10/31/2019 12:40 PM   Pulse 88 10/31/2019 12:46 PM   SpO2 100 % 10/31/2019 12:46 PM   Resp     Temp 32  C (89.6  F) 10/31/2019 12:40 PM   Ht Rate 86 10/31/2019 12:42 PM   Temp 2 0  C (32  F) 10/31/2019 12:40 PM         Electronically Signed By: Sintia Blevins MD, 2019, 1:46 PM

## 2019-10-31 NOTE — PROGRESS NOTES
STM Recheck Visit    Subjective measures:   Pt states she still is having issues with her mask. She is not eligible for a new mask due to not being able to meet compliance and being beyond the thirty day exchange.  She will discuss her options with her provider on 11/4/19    Assessment: Pt not meeting objective benchmarks for AHI, leak and compliance  Patient failing following subjective benchmarks: mask issues   Action plan:  Patient has a follow up visit with Dr. Wright on 11/4/19  Device type: CPAP  PAP settings: CPAP fixed 12 cm  H20    Objective measures: 14 day rolling measures         Compliance  0 %     % of night spent in large leak  20 % last  upload      AHI 25.11   last  Upload (OA index is high some nights)      Average number of minutes 120    Diagnostic AHI: 14           Objective measure goal  Compliance   Goal >70%  Leak   Goal < 10%  AHI  Goal < 5  Usage  Goal >240

## 2019-11-01 NOTE — OP NOTE
Urology Operative Summary    Pre-operative diagnosis: 1. Overactive bladder  2. Functional incontinence     Post-operative diagnosis: Same     Procedure:   Cystourethroscopy with injection of botulinum toxin A  SP tube change     Surgeon: Loki Gordon MD/MS   Assistant(s): None available      Anesthesia: MAC       Estimated blood loss: Less than 10 ml     Complications: None   Condition: Patient taken to recovery in stable condition.     Indications: Sonya Foote is a 69 year old female  with overactive bladder and bilateral lower extremity amputations. She developed functional incontinence because she could not move to the toilet fast enough. She had an SP tube placed and continued to have spasms and incontinence per urethra several times per day. Unable to take anticholinergics. She understands the risks and benefits of the various options and elects to proceed with botulinum toxin injection understanding the risks for urinary obstruction, infection, pain, bleeding, need for future procedures and risks of anesthesia.    Procedure: Sonya Foote was taken to the operating room in her  usual state of health. She was positioned in Patient Positioning: Lithotomy. Her genitalia were prepped and draped in the standard fashion. A time-out was performed to ensure proper patient, procedure and positioning.  The patient received appropriate IV antibiotics prior to the procedure based on pre-operative urine culture.    The procedure was initiated with insertion of a rigid cystoscope. The bladder mucosa appeared to be normal without stones, tumors or diverticulum on 360 degree inspection.     We mixed 2 vials of 100 U botulinum toxin A into 20 cc's of injectable saline for a total of 200 U.    Bladder injection: We performed a template injection procedure into the bladder wall with a total of 20 injection sites. We then changed her suprapubic tube from a 14 Fr to a 16 Fr catheter to allow better drainage. We then emptied the  bladder to re-inspect our injection sites and found that there was a minimal amount of blood. The patient was returned to supine position and transported to recovery in stable condition.

## 2019-11-04 ENCOUNTER — OFFICE VISIT (OUTPATIENT)
Dept: SLEEP MEDICINE | Facility: CLINIC | Age: 69
End: 2019-11-04
Payer: COMMERCIAL

## 2019-11-04 VITALS
DIASTOLIC BLOOD PRESSURE: 52 MMHG | BODY MASS INDEX: 49.43 KG/M2 | OXYGEN SATURATION: 100 % | HEART RATE: 64 BPM | WEIGHT: 130 LBS | SYSTOLIC BLOOD PRESSURE: 92 MMHG

## 2019-11-04 DIAGNOSIS — Z93.59 SUPRAPUBIC CATHETER (H): Chronic | ICD-10-CM

## 2019-11-04 DIAGNOSIS — I25.10 CORONARY ARTERY DISEASE INVOLVING NATIVE CORONARY ARTERY OF NATIVE HEART, ANGINA PRESENCE UNSPECIFIED: Primary | Chronic | ICD-10-CM

## 2019-11-04 DIAGNOSIS — G47.39 COMPLEX SLEEP APNEA SYNDROME: ICD-10-CM

## 2019-11-04 DIAGNOSIS — G47.33 OSA (OBSTRUCTIVE SLEEP APNEA): ICD-10-CM

## 2019-11-04 DIAGNOSIS — G89.4 CHRONIC PAIN SYNDROME: Chronic | ICD-10-CM

## 2019-11-04 DIAGNOSIS — Z86.73 HISTORY OF CVA (CEREBROVASCULAR ACCIDENT): Chronic | ICD-10-CM

## 2019-11-04 PROBLEM — G43.909 MIGRAINE HEADACHE: Chronic | Status: ACTIVE | Noted: 2018-03-20

## 2019-11-04 PROBLEM — F11.90 CHRONIC, CONTINUOUS USE OF OPIOIDS: Chronic | Status: ACTIVE | Noted: 2019-06-19

## 2019-11-04 PROBLEM — K21.9 ESOPHAGEAL REFLUX: Chronic | Status: ACTIVE | Noted: 2019-10-31

## 2019-11-04 PROCEDURE — 99215 OFFICE O/P EST HI 40 MIN: CPT | Performed by: INTERNAL MEDICINE

## 2019-11-04 NOTE — NURSING NOTE
"Chief Complaint   Patient presents with     CPAP Follow Up       Initial BP 92/52   Pulse 64   Wt 59 kg (130 lb)   SpO2 100%   BMI 49.43 kg/m   Estimated body mass index is 49.43 kg/m  as calculated from the following:    Height as of 9/17/19: 1.092 m (3' 7\").    Weight as of this encounter: 59 kg (130 lb).    Medication Reconciliation: complete      "

## 2019-11-04 NOTE — PROGRESS NOTES
"Obstructive Sleep Apnea - PAP Follow-Up Visit:    Chief Complaint   Patient presents with     CPAP Follow Up       Sonya Foote comes in today for follow-up of their complex sleep apnea, managed with CPAP.     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 Did have Restless Legs Syndrome before amputation but not anymore.     Sleep history:   10/26/2012 - Polysomnography at [facility] -  min; AHI 14; RDI 22; ERIN 5/hr; No PLMs or RBD.    11/16/2012 - Titration Polysomnography at [facility] - Started on CPAP and developed treatment-emergent central apneas. Adaptive Servo-Ventilation titration optimal but did well on CPAP 8 as well (at least REM supine per comments).     Put on CPAP 8 cmH2O.     Initially presented to [facility] 2017 on CPAP 8 cmH20 and elevated AHI 9.7 on download. Not a candidate for ASV due to reduced ejection fraction.     2/26/2017 - Titration Polysomnography at the [facility]. CPAP 5 - 12 cmH2O tried. Unacceptable titration due to high residual AHI, however, oxygen desaturations were controlled on CPAP of 10 cmH2O or higher, and looked better on CPAP of 12 cmH2O.    Changed to CPAP 12 cmH2O.    Echo 5/2018   Hyperdynamic left ventricular function  The visual ejection fraction is estimated at >70%.  No regional wall motion abnormalities noted.  Technically difficult, suboptimal study.     She got a new PAP machine 7/2019, some supplies are older than that    Overall, she rates the experience with PAP as 8 (0 poor, 10 great). The mask is not comfortable.  The mask is uncomfortable because of \"comes apart\".  The velcro is old. The mask is leaking at her all " around.  The mask is leaking 7 nights per week.  She is not snoring with the mask on. She is not having gasp arousals.  She is having significant oral/nasal dryness. The pressure is comfortable.     Her PAP interface is Full Face Mask.    Bedtime is typically 8 PM, she watches TV and finally falls asleep 0300 She has problems falling asleep due to pain. Usually it takes about 60 min minutes to fall asleep with the mask on. Wake time is typically 0600.  Patient is using PAP therapy 3 hours per night. The patient is usually getting 3 hours of sleep per night.    She naps every other day for 1-2 hours and 'tries' to wear her PAP.     She lives at [facility] [place].     She does feel rested in the morning.    Total score - [place]: 5 (11/4/2019 10:00 AM)  EUGENIA Total Score: 17      Respironics  CPAP 12 cmH2O 30 day usage data:  3% of days with > 4 hours of use. 3/30 days with no use.   Average use 127 minutes per day.   Average leak 62.81 LPM.  Average % of night in large leak 22%.    AHI 23.07 events per hour.   PB 7%  OAI 15.5    Recent Labs   Lab Test 10/31/19  0908 10/23/19  1052 09/17/19  1123 06/19/19  1026   NA  --   --  141 142   POTASSIUM 4.1 4.2 3.8 3.6   CHLORIDE  --   --  107 106   CO2  --   --  26 27   ANIONGAP  --   --  8 9   GLC  --  78 78 117*   BUN  --   --  9 15   CR 0.61  --  0.48* 0.61   CAROL  --   --  8.7 9.0           Past medical/surgical history, family history, social history, medications and allergies were reviewed.      Problem List:  Patient Active Problem List    Diagnosis Date Noted     S/P AKA (above knee amputation) bilateral (H) 06/19/2019     Priority: High     s/p left above-the-knee amputation for peripheral vascular disease and thrombosis on 06/06/2016 and a right above-the-knee amputation for peripheral arterial disease on 11/23/2016.        History of CVA (cerebrovascular accident) 06/25/2018     Priority: High     She had 70% stenosis of her left internal carotid  artery in 2006, initially presented with left eye blindness due to central retinal artery occlusion. She also had left M1 occlusion. She was treated with left carotid angioplasty and stenting to improve collateral flow to the left MCA territory throught BONIFACIO and left PCA, which has a fetal origin from the left ICA       Coronary artery disease involving native coronary artery of native heart 04/04/2017     Priority: High     -Unstable angina and inferior wall myocardial infarction [facility] 09/2016.  EF by cardiac MRI in June had been 60%. Angiography in 09/2016 with high-grade stenosis of the proximal right coronary artery.  Intracoronary stenting was performed.  Her ejection fraction fell to 35%-40%. There was evidence of LAD distribution akinesis as well as the inferior wall akinesis on echocardiogram in 12/2016.   -Atypical chest pain on 05/02/18.  Coronary angiography  demonstrated moderate in-stent renarrowing within the right coronary artery which did not appear to be flow limiting with a normal fractional flow reserve.  There was only mild disease in the left coronary system with some calcification and 15%-20% in the proximal LAD and 25%-35% narrowing after the septal .  The mid to distal LAD was normal.  The diagonal had a less than 35% stenosis.  Circumflex in essence comprised 1 multi-branching obtuse marginal with trivial plaque.  Ejection fraction on left ventriculogram was 52%-55% with end-diastolic pressure of 18 mmHg.  Medical therapy was suggested.        Schizoaffective disorder (H) 06/07/2013     Priority: High     Chronic pain syndrome 03/20/2009     Priority: High     Has a fentanyl PUMP    Patient is followed by Allan Casey for ongoing prescription of pain meds  All refills should be approved by this provider, or covering partner.    Maximum quantity per month: 1 month  Clinic visit frequency required: Q 6  months     Controlled substance agreement:  Encounter-Level  CSA:     There are no encounter-level csa.        Pain Clinic evaluation in the past: No    DIRE Total Score(s):  No flowsheet data found.    Last [facility] website verification:  6/6/18   [email or web address]       Suprapubic catheter (H) 11/04/2019     Priority: Medium     Polypharmacy 10/31/2019     Priority: Medium     Chronic, continuous use of opioids 06/19/2019     Priority: Medium     Tobacco abuse 06/26/2018     Priority: Medium     Migraine headache 03/20/2018     Priority: Medium     Complex sleep apnea syndrome 03/15/2017     Priority: Medium     JOSE (obstructive sleep apnea)-  02/22/2017     Priority: Medium     10/26/2012 - Polysomnography at [facility] -  min; AHI 14; RDI 22; ERIN 5/hr; No PLMs or RBD.  11/16/2012 - Titration Polysomnography at [facility] - Started on CPAP and developed treatment-emergent central apneas.  Adaptive Servo-Ventilation titration optimal but did well on CPAP 8 as well. Put on CPAP 8 cmH2O.    Has systolic CHF, intrathecal narcotic pump, and treatment-emergent Central Sleep Apnea on CPAP.  Titration Study: 2/26/2017 - (130.0 lbs) CPAP was titrated to a pressure of 12 with an AHI of 38.5.  Time Spent in REM supine at this pressure was 7.0       Diabetes mellitus type 2 without retinopathy (H) 12/08/2016     Priority: Medium     Essential hypertension 09/02/2016     Priority: Medium     Stenosis of carotid artery 04/01/2016     Priority: Medium     History of a left carotid stent placement in 2006.    Carotid angiogram 4/2016 showed 80% stenosis of the cervical left carotid artery.  Dr Richardson performed balloon angioplasty with subsequent <25% stenosis.  Carotid US 5/2019: < 50% stenosis to ASTRID and patent left LIC artery stent.       PAD (peripheral artery disease) (H) 09/14/2015     Priority: Medium     S/p bilateral above-knee amputation 2016       Person who has had sex change operation 01/20/2014     Priority: Medium     SEX TRANSFORMATION SURGERY, MALE TO FEMALE 1974        Simple chronic bronchitis (H) 01/01/2013     Priority: Medium     Chronic cough, >75 pack years  Pulmonary function test in the past have shown normal spirometry and normal lung volumes and mildly reduced diffusion capacity.     CT scan 2018 showed no evidence of bronchiectasis or emphysema.        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 The most likely occurrence of a 46,XY karyotype with a female phenotype is Androgen Insensitivity Syndrome (testicular feminizing syndrome), an X-linked recessive disorder caused by a mutation in the androgen receptor gene; it may also be due  to XY pure gonadal dysgenesis.          Iron deficiency anemia 11/18/2005     Priority: Medium     Esophageal reflux 10/31/2019     Priority: Low     Blind left eye      Priority: Low     Blindness left eye - since 2006 stroke per patient.         Chronic back pain      Priority: Low     Legally blind in right eye, as defined in [place]      Priority: Low     No periphal vision right eye       Other neuromuscular dysfunction of bladder 09/24/2019     Priority: Low     Incontinence 01/16/2018     Priority: Low     Functional incontinence 07/19/2017     Priority: Low     History of recurrent UTIs 07/19/2017     Priority: Low     Bee sting reaction, undetermined intent, subsequent encounter 04/06/2017     Priority: Low     Primary open angle glaucoma of both eyes, severe stage 12/08/2016     Priority: Low     Pseudophakia of right eye 12/08/2016     Priority: Low     Cataract, left eye 12/08/2016     Priority: Low     Type 2 diabetes mellitus with diabetic peripheral angiopathy without gangrene, without long-term current use of  insulin (H) 10/12/2016     Priority: Low     Dysphagia 08/09/2016     Priority: Low     chronic dysphagia, esophageal strictures and multple previous dilatations.       Osteoarthritis 05/19/2016     Priority: Low     Chronic neck pain 01/15/2015     Priority: Low     Intestinal malabsorption 08/06/2014     Priority: Low     Vertigo 11/08/2013     Priority: Low     AS (sickle cell trait) (H) 10/08/2013     Priority: Low     Osteoporosis 01/21/2013     Priority: Low     Hx of colonic polyp 07/13/2009     Priority: Low     Colonoscopy completed on July 2009.  See report for full detail.  Please re refer in 5 years for repeat colon cancer screening if medically appropriate.  Need colyte 4/2 preparation.       Peripheral neuropathy 07/10/2009     Priority: Low     Thoracic or lumbosacral neuritis or radiculitis 03/20/2009     Priority: Low     Lumbosacral radiculitis 03/20/2009     Priority: Low     Degeneration of intervertebral disc of lumbosacral region 11/18/2005     Priority: Low        BP 92/52   Pulse 64   Wt 59 kg (130 lb)   SpO2 100%   BMI 49.43 kg/m      Impression/Plan:     Mild Sleep obstructive sleep apnea by sleep study 2012.  History of complex apnea with 'treatment emergent central apneas' by titration styudy 2012. Treated with initially ASV, later PAP after EF worsened.Last study 2/2017 suggested severe incompletely treated obstructive sleep apnea at CPAP 12 cmH20.  Multiple comorbidities.     - Did not meet compliance for her new machine needs to be restudied to requalify for PAP. Suspect she is sleeping in bed 'watching TV' off of PAP  - Treatment complicated by reported short sleep times, high leaks, drymouth   - See DME about supplies, humidifer use.   - Her EF appears to be normalized, but echo is now > 1 year old  - Repeat Echo prior to sleep study  - Polysomnogram (using 4% desaturation/Medicare/2012 AASM 1B scoring rules) with all night titration starting at 5 cm, consider use of ASV if >50%  events are central and EF remains > 45% on recheck.  - She claims to be 'usually' independent in cares, but has bilateral below-knee amputations and is in a wheelchair. Will recommend she be 1:1, and suspect she may need help, should confirm with nursing home        Sleep onset difficulties   Pain, Psychophysiologic insomnia, poor sleep hygiene  - insufficient time to address in detail       Sonya Foote will follow up in about 2 weeks, 40 or 60 minutes visit    >> Forty minutes spent with patient, all of which were spent face-to-face counseling, consulting, coordinating plan of care.

## 2019-11-06 ENCOUNTER — HOSPITAL ENCOUNTER (OUTPATIENT)
Dept: OCCUPATIONAL THERAPY | Facility: CLINIC | Age: 69
Discharge: HOME | End: 2019-11-06
Attending: OCCUPATIONAL THERAPIST | Admitting: OCCUPATIONAL THERAPIST
Payer: COMMERCIAL

## 2019-11-06 PROCEDURE — 97535 SELF CARE MNGMENT TRAINING: CPT | Mod: GO | Performed by: OCCUPATIONAL THERAPIST

## 2019-11-06 NOTE — DISCHARGE INSTRUCTIONS
Visual Rehabilitation Summary  1.  Ask Dayami to read your text messages.  2. Seeing  ashish - (an icon on the bottom of your phone)   a. Hold the phone over text and listen to the ashish read it out load  3. Use the phone camera as a magnifier  a. Open the camera   b. Pinch to make things bigger and smaller.  Manuela Mitchell, OTR/L  [phone]

## 2019-11-11 ENCOUNTER — MEDICAL CORRESPONDENCE (OUTPATIENT)
Dept: HEALTH INFORMATION MANAGEMENT | Facility: CLINIC | Age: 69
End: 2019-11-11

## 2019-11-13 ENCOUNTER — APPOINTMENT (OUTPATIENT)
Dept: GENERAL RADIOLOGY | Facility: CLINIC | Age: 69
End: 2019-11-13
Attending: INTERNAL MEDICINE
Payer: COMMERCIAL

## 2019-11-13 ENCOUNTER — ANCILLARY PROCEDURE (OUTPATIENT)
Dept: CARDIOLOGY | Facility: CLINIC | Age: 69
End: 2019-11-13
Attending: INTERNAL MEDICINE
Payer: COMMERCIAL

## 2019-11-13 DIAGNOSIS — G47.39 COMPLEX SLEEP APNEA SYNDROME: ICD-10-CM

## 2019-11-13 DIAGNOSIS — Z93.59 SUPRAPUBIC CATHETER (H): Chronic | ICD-10-CM

## 2019-11-13 DIAGNOSIS — Z86.73 HISTORY OF CVA (CEREBROVASCULAR ACCIDENT): Chronic | ICD-10-CM

## 2019-11-13 DIAGNOSIS — G89.4 CHRONIC PAIN SYNDROME: Chronic | ICD-10-CM

## 2019-11-13 DIAGNOSIS — G47.33 OSA (OBSTRUCTIVE SLEEP APNEA): ICD-10-CM

## 2019-11-13 DIAGNOSIS — I25.10 CORONARY ARTERY DISEASE INVOLVING NATIVE CORONARY ARTERY OF NATIVE HEART, ANGINA PRESENCE UNSPECIFIED: Chronic | ICD-10-CM

## 2019-11-13 PROCEDURE — 93306 TTE W/DOPPLER COMPLETE: CPT | Performed by: INTERNAL MEDICINE

## 2019-11-13 NOTE — RESULT ENCOUNTER NOTE
ASHLEY  Called and notified pt of Echo results. She is also scheduled for the psg at [place].           Ghazala Alcantar MA on 11/13/2019 at 4:02 PM

## 2019-11-13 NOTE — RESULT ENCOUNTER NOTE
Hear function is normal  Does not look like patient has her sleep titration study scheduled yet......

## 2019-11-18 ENCOUNTER — HOSPITAL ENCOUNTER (OUTPATIENT)
Dept: OCCUPATIONAL THERAPY | Facility: CLINIC | Age: 69
Discharge: HOME | End: 2019-11-18
Attending: OCCUPATIONAL THERAPIST | Admitting: OCCUPATIONAL THERAPIST
Payer: COMMERCIAL

## 2019-11-18 ENCOUNTER — PRE VISIT (OUTPATIENT)
Dept: UROLOGY | Facility: CLINIC | Age: 69
End: 2019-11-18

## 2019-11-18 PROCEDURE — 97535 SELF CARE MNGMENT TRAINING: CPT | Mod: GO | Performed by: OCCUPATIONAL THERAPIST

## 2019-11-18 NOTE — TELEPHONE ENCOUNTER
Reason for Visit: Post-op follow up, procedure 10/31    Diagnosis: OAB, functional incontinence    Orders/Procedures/Records: Records available    Contact Patient: N/A    Rooming Requirements: Travis Sheridan  11/18/19  12:29 PM

## 2019-11-18 NOTE — DISCHARGE INSTRUCTIONS
Visual Rehabilitation Summary  11/18/2019  Mike Bonnet Syndrome:   Visual Hallucinations secondary to vision loss.  This is a normal result of vision loss.  Use your phone camera as a magnifier. Simply turn the camera on, and then pinch bigger/smaller to enlarge the image.     To Whom It May Concern (Dietician) 11/18/2019  Please serve Sonya her meals in bowls instead of on a plate.  With her vision loss and neuropathy, it is particularly difficult to scoop foods from a plate. Bowls provide the necessary  edge  to scoop against.  Thank you    Manuela Mitchell OTR/L  [facility]  Visual Rehabilitation   [phone]    Scanner Bin - The [employer] Document Scanning Solution   [email or web address]  $12.50

## 2019-11-19 ENCOUNTER — OFFICE VISIT (OUTPATIENT)
Dept: INTERNAL MEDICINE | Facility: CLINIC | Age: 69
End: 2019-11-19
Payer: COMMERCIAL

## 2019-11-19 VITALS
OXYGEN SATURATION: 98 % | DIASTOLIC BLOOD PRESSURE: 60 MMHG | TEMPERATURE: 98.2 F | BODY MASS INDEX: 49.05 KG/M2 | WEIGHT: 129 LBS | SYSTOLIC BLOOD PRESSURE: 98 MMHG | HEART RATE: 83 BPM | RESPIRATION RATE: 16 BRPM

## 2019-11-19 DIAGNOSIS — R39.81 FUNCTIONAL INCONTINENCE: Primary | ICD-10-CM

## 2019-11-19 DIAGNOSIS — G89.4 CHRONIC PAIN SYNDROME: ICD-10-CM

## 2019-11-19 DIAGNOSIS — M54.50 LOW BACK PAIN, UNSPECIFIED BACK PAIN LATERALITY, UNSPECIFIED CHRONICITY, UNSPECIFIED WHETHER SCIATICA PRESENT: ICD-10-CM

## 2019-11-19 DIAGNOSIS — E11.51 TYPE 2 DIABETES MELLITUS WITH DIABETIC PERIPHERAL ANGIOPATHY WITHOUT GANGRENE, WITHOUT LONG-TERM CURRENT USE OF INSULIN (H): ICD-10-CM

## 2019-11-19 DIAGNOSIS — M25.50 MULTIPLE JOINT PAIN: ICD-10-CM

## 2019-11-19 DIAGNOSIS — Z89.612 S/P AKA (ABOVE KNEE AMPUTATION) BILATERAL (H): ICD-10-CM

## 2019-11-19 DIAGNOSIS — Z89.611 S/P AKA (ABOVE KNEE AMPUTATION) BILATERAL (H): ICD-10-CM

## 2019-11-19 PROCEDURE — 99214 OFFICE O/P EST MOD 30 MIN: CPT | Performed by: INTERNAL MEDICINE

## 2019-11-19 RX ORDER — LIDOCAINE 50 MG/G
PATCH TOPICAL
Qty: 30 PATCH | Refills: 1 | Status: SHIPPED | OUTPATIENT
Start: 2019-11-19 | End: 2020-05-04 | Stop reason: DRUGHIGH

## 2019-11-19 NOTE — LETTER
[facility]  [address] [address]  [phone]      11/19/2019       Sonya Foote  [address] [facility] [address]        Dear Sonya,    You are due to get your blood sugar check sometime towards the middle of December.  I have placed orders for your A1c level and you may schedule an appointment at your convenience.    Sincerely,      Allan Casey MD  Internal Medicine

## 2019-11-19 NOTE — PROGRESS NOTES
Subjective     HPI   Sonya Foote is a 69 year old female  with overactive bladder and bilateral lower extremity amputations. She developed functional incontinence because she could not move to the toilet fast enough. She had an SP tube placed and continued to have spasms and incontinence per urethra several times per day. Unable to take anticholinergics she underwent a recent surgical procedure and here is today for follow-up.    Patient states she has noticed slight improvement with urinary pain, still voiding frequently.    Other concerns:  1. Patient requesting written orders for nicotine patch she is using these patches through a program that she is involved in and needs a written note stating that she can use them at the nursing home facility she is residing at.  2. Requesting orders for pressure air mattress due previous pressure sores as she has had problems with recurrent pressure sores and low back pain and has had increasing difficulty with sleeping and is requesting an air pressure mattress.  3. Requesting recommendations on rheumatologist due to the fact that she feels she has ongoing joint pain and discomfort and would like to discuss with a rheumatologist for potential evaluation  4. Patient requesting orders for a pain patch for her Lidoderm patches the 5% patches that she applies daily and removes after 12 hours.  She has been on these in the past but not since she returned from [place].      Patient Active Problem List   Diagnosis     Seizure disorder (H)     Osteoporosis     Schizoaffective disorder (H)     AS (sickle cell trait) (H)     Vertigo     Person who has had sex change operation     Intestinal malabsorption     Chronic neck pain     Chronic pain syndrome     Hx of colonic polyp     Iron deficiency anemia     Degeneration of intervertebral disc of lumbosacral region     Thoracic or lumbosacral neuritis or radiculitis     Androgen insensitivity syndrome     PAD (peripheral artery disease) (H)      Stenosis of carotid artery     Osteoarthritis     Essential hypertension     Type 2 diabetes mellitus with diabetic peripheral angiopathy without gangrene, without long-term current use of insulin (H)     Anxiety disorder     Lumbosacral radiculitis     Peripheral neuropathy     Primary open angle glaucoma of both eyes, severe stage     Pseudophakia of right eye     Cataract, left eye     Diabetes mellitus type 2 without retinopathy (H)     Simple chronic bronchitis (H)     JOSE (obstructive sleep apnea)-      Complex sleep apnea syndrome     Coronary artery disease involving native coronary artery of native heart     Bee sting reaction, undetermined intent, subsequent encounter     Functional incontinence     History of recurrent UTIs     Dysphagia     Incontinence     Migraine headache     History of CVA (cerebrovascular accident)     Tobacco abuse     S/P AKA (above knee amputation) bilateral (H)     Chronic, continuous use of opioids     Other neuromuscular dysfunction of bladder     Hyperlipidemia LDL goal <100     Blind left eye     Chronic back pain     Legally blind in right eye, as defined in [place]     Polypharmacy     Esophageal reflux     Suprapubic catheter (H)     Past Surgical History:   Procedure Laterality Date     AMPUTATE LEG ABOVE KNEE Left 6/11/2016    Procedure: AMPUTATE LEG ABOVE KNEE;  Surgeon: Mello Rodriguez MD;  [facility]     AMPUTATE LEG BELOW KNEE Right 11/7/2016    Procedure: AMPUTATE LEG BELOW KNEE;  Surgeon: Savannah Durant MD;  [facility]     AMPUTATE REVISION STUMP LOWER EXTREMITY Right 11/11/2016    Procedure: AMPUTATE REVISION STUMP LOWER EXTREMITY;  Surgeon: Savannah Durant MD;  [facility]     AMPUTATE REVISION STUMP LOWER EXTREMITY Right 11/16/2016    Procedure: AMPUTATE REVISION STUMP LOWER EXTREMITY;  Surgeon: Savannah Durant MD;  [facility]     AMPUTATE TOE(S) Right 1/5/2016    Procedure: AMPUTATE TOE(S);  Surgeon: Mello Gaines DPM;  Location: [facility]      ANGIOGRAM Bilateral 11/21/2014    Procedure: ANGIOGRAM;  Surgeon: Savannah Durant MD;  [facility]     ANGIOGRAM Left 1/16/2015    Procedure: ANGIOGRAM;  Surgeon: Savannah Durant MD;  [facility]     ANGIOGRAM Bilateral 9/14/2015    Procedure: ANGIOGRAM;  Surgeon: Savannah Durant MD;  [facility]     ANGIOGRAM Left 10/12/2015    Procedure: ANGIOGRAM;  Surgeon: Savannah Durant MD;  [facility]     ANGIOGRAM Right 6/6/2016    Procedure: ANGIOGRAM;  Surgeon: Savannah Durant MD;  [facility]     ANGIOPLASTY Right 6/6/2016    Procedure: ANGIOPLASTY;  Surgeon: Savannah Durant MD;  [facility]     APPENDECTOMY       BREAST SURGERY      right breast bx (benign)     CATARACT IOL, RT/LT Right      CHOLECYSTECTOMY       COLONOSCOPY N/A 8/25/2014    Procedure: COLONOSCOPY;  Surgeon: Mello Ferrer MD;  [facility]     COLONOSCOPY WITH CO2 INSUFFLATION N/A 8/20/2014    Procedure: COLONOSCOPY WITH CO2 INSUFFLATION;  Surgeon: Duane, William Charles, MD;  Location[facility]     CYSTOSCOPY, INTRAVESICAL INJECTION N/A 10/31/2019    Procedure: CYSTOSCOPY, BOTOX INJECTION, Suprapubic Catheter Exchange;  Surgeon: Loki Gordon MD;  [facility]     CYSTOSTOMY, INSERT TUBE SUPRAPUBIC, COMBINED N/A 1/16/2018    Procedure: COMBINED CYSTOSTOMY, INSERT TUBE SUPRAPUBIC;  Cystoscopy, Intraoperative Ultrasound, Suprapubic Tube Placement;  Surgeon: Keanu Dawson MD;  [facility]     ENDARTERECTOMY FEMORAL  5/23/2014    Procedure: ENDARTERECTOMY FEMORAL;  Surgeon: Jason Joshi MD;  [facility]     ESOPHAGOSCOPY, GASTROSCOPY, DUODENOSCOPY (EGD), COMBINED  12/14/2012    Procedure: COMBINED ESOPHAGOSCOPY, GASTROSCOPY, DUODENOSCOPY (EGD), BIOPSY SINGLE OR MULTIPLE;  ESOPHAGOSCOPY, GASTROSCOPY, DUODENOSCOPY (EGD), DILATATION ;  Surgeon: Elizabeth Stevenson MD;  Location:  GI     ESOPHAGOSCOPY, GASTROSCOPY, DUODENOSCOPY (EGD), COMBINED  12/31/2013    Procedure: COMBINED ESOPHAGOSCOPY,  GASTROSCOPY, DUODENOSCOPY (EGD), BIOPSY SINGLE OR MULTIPLE;;  Surgeon: Clemente Lopez MD;  [facility]     ESOPHAGOSCOPY, GASTROSCOPY, DUODENOSCOPY (EGD), COMBINED  4/1/2014    Procedure: COMBINED ESOPHAGOSCOPY, GASTROSCOPY, DUODENOSCOPY (EGD);;  Surgeon: Clemente Lopez MD;  [facility]     ESOPHAGOSCOPY, GASTROSCOPY, DUODENOSCOPY (EGD), COMBINED  6/28/2014    Procedure: COMBINED ESOPHAGOSCOPY, GASTROSCOPY, DUODENOSCOPY (EGD);  Surgeon: Clemente Lopez MD;  [facility]     ESOPHAGOSCOPY, GASTROSCOPY, DUODENOSCOPY (EGD), COMBINED N/A 8/20/2014    Procedure: COMBINED ESOPHAGOSCOPY, GASTROSCOPY, DUODENOSCOPY (EGD), BIOPSY SINGLE OR MULTIPLE;  Surgeon: Duane, William Charles, MD;  Location:  OR     ESOPHAGOSCOPY, GASTROSCOPY, DUODENOSCOPY (EGD), COMBINED N/A 8/22/2014    Procedure: COMBINED ESOPHAGOSCOPY, GASTROSCOPY, DUODENOSCOPY (EGD), BIOPSY SINGLE OR MULTIPLE;  Surgeon: Mello Ferrer MD;  [facility]     ESOPHAGOSCOPY, GASTROSCOPY, DUODENOSCOPY (EGD), COMBINED N/A 10/2/2014    Procedure: COMBINED ESOPHAGOSCOPY, GASTROSCOPY, DUODENOSCOPY (EGD), BIOPSY SINGLE OR MULTIPLE;  Surgeon: Remy Haskins MD;  [facility]     ESOPHAGOSCOPY, GASTROSCOPY, DUODENOSCOPY (EGD), COMBINED Left 12/15/2014    Procedure: COMBINED ESOPHAGOSCOPY, GASTROSCOPY, DUODENOSCOPY (EGD), BIOPSY SINGLE OR MULTIPLE;  Surgeon: Remy Haskins MD;  [facility]     ESOPHAGOSCOPY, GASTROSCOPY, DUODENOSCOPY (EGD), COMBINED N/A 2/25/2015    Procedure: COMBINED ENDOSCOPIC ULTRASOUND, ESOPHAGOSCOPY, GASTROSCOPY, DUODENOSCOPY (EGD), FINE NEEDLE ASPIRATE/BIOPSY;  Surgeon: Clemente Lugo MD;  [facility]     ESOPHAGOSCOPY, GASTROSCOPY, DUODENOSCOPY (EGD), COMBINED Left 2/25/2015    Procedure: COMBINED ESOPHAGOSCOPY, GASTROSCOPY, DUODENOSCOPY (EGD), BIOPSY SINGLE OR MULTIPLE;  Surgeon: Clemente Lugo MD;  Location:  GI     ESOPHAGOSCOPY, GASTROSCOPY, DUODENOSCOPY (EGD), COMBINED N/A 9/25/2016     Procedure: COMBINED ESOPHAGOSCOPY, GASTROSCOPY, DUODENOSCOPY (EGD);  Surgeon: Aziza Patiño MD;  [facility]     ESOPHAGOSCOPY, GASTROSCOPY, DUODENOSCOPY (EGD), COMBINED N/A 1/18/2017    Procedure: COMBINED ESOPHAGOSCOPY, GASTROSCOPY, DUODENOSCOPY (EGD), BIOPSY SINGLE OR MULTIPLE;  Surgeon: Clemente Lopez MD;  [facility]     ESOPHAGOSCOPY, GASTROSCOPY, DUODENOSCOPY (EGD), COMBINED N/A 11/26/2017    Procedure: COMBINED ESOPHAGOSCOPY, GASTROSCOPY, DUODENOSCOPY (EGD), REMOVE FOREIGN BODY;  Esophagogastroduodenoscopy with foreign body extraction  ;  Surgeon: Herberth Castrejon MD;  [facility]     ESOPHAGOSCOPY, GASTROSCOPY, DUODENOSCOPY (EGD), COMBINED N/A 11/26/2017    Procedure: COMBINED ESOPHAGOSCOPY, GASTROSCOPY, DUODENOSCOPY (EGD), REMOVE FOREIGN BODY;;  Surgeon: Herberth Castrejon MD;  [facility]     FASCIOTOMY LOWER EXTREMITY Left 6/10/2016    Procedure: FASCIOTOMY LOWER EXTREMITY;  Surgeon: Mello Rodriguez MD;  [facility]     HC CAPSULE ENDOSCOPY N/A 8/25/2014    Procedure: CAPSULE/PILL CAM ENDOSCOPY;  Surgeon: Remy Haskins MD;  [facility]     HC CAPSULE ENDOSCOPY N/A 10/2/2014    Procedure: CAPSULE/PILL CAM ENDOSCOPY;  Surgeon: Remy Haskins MD;  [facility]     ORTHOPEDIC SURGERY      broken wrist repair     SEX TRANSFORMATION SURGERY, MALE TO FEMALE  1974 1974     SINUS SURGERY      cyst removed     TONSILLECTOMY       VASCULAR SURGERY  2006    Left carotid stent       Social History     Tobacco Use     Smoking status: Current Every Day Smoker     Packs/day: 0.25     Years: 30.00     Pack years: 7.50     Types: Cigarettes, Cigars     Smokeless tobacco: Never Used   Substance Use Topics     Alcohol use: No     Alcohol/week: 0.0 standard drinks     Family History   Problem Relation Age of Onset     Dementia Mother      Glaucoma Mother      Diabetes Mother         may have been type 1, childhood     Coronary Artery Disease Mother         MI      Glaucoma Father      Diabetes Father         may hev been type 1 - ??     Heart Failure Father      Cancer Maternal Aunt         leukemia     Schizophrenia Brother      Depression Brother      Suicide Sister      Depression Sister      Diabetes Sister      Cancer Maternal Aunt         ovarian     Glaucoma Maternal Grandmother      Diabetes Maternal Grandmother      Glaucoma Maternal Grandfather      Diabetes Maternal Grandfather      Glaucoma Paternal Grandmother      Diabetes Paternal Grandmother      Glaucoma Paternal Grandfather      Diabetes Paternal Grandfather      Breast Cancer Sister      Cerebrovascular Disease Brother      Colon Cancer No family hx of      Macular Degeneration No family hx of          Current Outpatient Medications   Medication Sig Dispense Refill     ACETAMINOPHEN PO Take 1,000 mg by mouth every 6 hours as needed for pain Not to exceed 4000 mg/day       ADVAIR DISKUS 250-50 MCG/DOSE diskus inhaler Inhale 1 puff into the lungs 2 times daily 60 Inhaler 11     albuterol (PROVENTIL) (2.5 MG/3ML) 0.083% neb solution INHALE 1 VIAL VIA NEBULIZER EVERY 6 HOURS AS NEEDED 360 mL 11     alendronate (FOSAMAX) 70 MG tablet Take 1 tablet (70 mg) by mouth with 8oz water every 7 days 30 minutes before breakfast and remain upright during this time. (Patient not taking: Reported on 11/4/2019) 12 tablet 3     aspirin EC 81 MG EC tablet Take 1 tablet (81 mg) by mouth daily 90 tablet 3     atorvastatin (LIPITOR) 40 MG tablet TAKE 1 TAB BY MOUTH ONCE DAILY 30 tablet 11     blood glucose monitoring (ONE TOUCH ULTRA 2) meter device kit Use to test blood sugars 3 times daily or as directed. 1 kit 0     blood glucose monitoring (ONE TOUCH ULTRA) test strip Use to test blood sugars 3 times daily or as directed. 3 Box 3     blood glucose monitoring (ONE TOUCH ULTRASOFT) lancets Use to test blood sugar 3 times daily or as directed. 100 each PRN     brimonidine (ALPHAGAN) 0.2 % ophthalmic solution Place 1 drop into  the right eye 2 times daily 1 Bottle 11     cetirizine (ZYRTEC) 10 MG tablet Take 1 tablet (10 mg) by mouth every evening (Patient not taking: Reported on 11/4/2019) 30 tablet 3     Cholecalciferol (VITAMIN D) 2000 UNITS tablet Take 2,000 Units by mouth daily. 100 tablet 3     clotrimazole (LOTRIMIN) 1 % cream INSERT 1 APPLICATORFUL VAGINALLY TWICE A DAY FOR VAGINAL PAIN 12 g 0     clotrimazole (LOTRIMIN) 1 % external cream Apply topically 2 times daily 12 g 0     diclofenac (VOLTAREN) 1 % GEL topical gel Apply 2 g topically 4 times daily to hands 100 g 11     dorzolamide (TRUSOPT) 2 % ophthalmic solution Place 1 drop into the right eye 2 times daily 1 Bottle 11     EPINEPHrine (EPIPEN/ADRENACLICK/OR ANY BX GENERIC EQUIV) 0.3 MG/0.3ML injection 2-pack Inject 0.3 mLs (0.3 mg) into the muscle as needed for anaphylaxis 0.6 mL 1     escitalopram (LEXAPRO) 10 MG tablet TAKE 1 TAB BY MOUTH ONCE DAILY (Patient not taking: Reported on 11/4/2019) 30 tablet 11     ferrous sulfate (IRON) 325 (65 FE) MG tablet Take 1 tablet (325 mg) by mouth 2 times daily With meals 60 tablet 2     fluticasone (FLONASE) 50 MCG/ACT nasal spray Spray 1 spray into both nostrils daily       lactulose (CHRONULAC) 10 GM/15ML solution Take 20 g by mouth as needed for constipation       latanoprost (XALATAN) 0.005 % ophthalmic solution Place 1 drop into both eyes At Bedtime 2.5 mL 11     levETIRAcetam (KEPPRA) 500 MG tablet TAKE 1 TAB BY MOUTH TWICE A DAY 60 tablet 5     lisinopril (PRINIVIL/ZESTRIL) 20 MG tablet TAKE 1 TAB BY MOUTH ONCE DAILY 30 tablet 11     metFORMIN (GLUCOPHAGE) 500 MG tablet TAKE 1 TAB BY MOUTH IN THE EVENING WITH DINNER 30 tablet 5     metoprolol succinate ER (TOPROL-XL) 50 MG 24 hr tablet TAKE 1 TAB BY MOUTH ONCE DAILY 30 tablet 11     nicotine (NICODERM CQ) 14 MG/24HR 24 hr patch Place 1 patch onto the skin every 24 hours 30 patch 3     nitroglycerin (NITROSTAT) 0.4 MG SL tablet Place 1 tablet (0.4 mg) under the tongue every 5  minutes as needed for chest pain if you are still having symptoms after 3 doses (15 minutes) call 911. 25 tablet 1     ondansetron (ZOFRAN-ODT) 8 MG ODT tab Take 1 tablet (8 mg) by mouth every 8 hours as needed for nausea 30 tablet 0     order for DME Equipment being ordered: Prosthetic legs 2 each 0     order for DME Equipment being ordered: Prosthetic 1 each 0     order for DME Equipment being ordered: lift recliner 1 Device 0     order for DME Equipment being ordered: Hospital Bed with side rails 1 each 0     order for DME Equipment being ordered: Power Wheel Chair 1 Device 0     order for DME Equipment being ordered: bandage tape, prefer paper 1 Package 0     order for DME Full electric hospital bed with half rails    Dx: L47641, I110, J449  Length of need: lifetime 1 Device 0     order for DME Wheel Chair Cushion: 18 x 18 inch Roho cushion 1 Device 0     order for DME Hospital bed with side rails 1 Device 0     order for DME Equipment being ordered: CPAP supplies mask, hose, filters, cushion    fax to [facility] at [phone] 10 Device prn     order for DME Equipment being ordered: CPAP supplies mask, hose, filters, cushion fax to [facility] at [phone]  Disp: 10 Device Refills: prn   Class: Local Print Start: 2/10/2017 1 Device 0     order for DME Equipment being ordered: Nebulizer and tubing supplies 1 Units 0     order for DME Equipment being ordered: Glucerna daily shakes with each meal 1 Box 11     ORDER FOR DME Equipment being ordered: Power Wheelchair 1 Device 0     ORDER FOR DME Equipment being ordered: Depends briefs 1 Month 11     oxyCODONE (ROXICODONE) 5 MG tablet TAKE 1 TABLET BY MOUTH EVERY 6 HOURS AS NEEDED  0     pantoprazole (PROTONIX) 40 MG EC tablet TAKE 1 TAB BY MOUTH ONCE DAILY 30 tablet 11     phenytoin (DILANTIN) 100 MG capsule TAKE 2 CAPS (200MG) BY MOUTH TWICE A DAY (Patient not taking: Reported on 11/4/2019) 120 capsule 11     polyethylene glycol (MIRALAX/GLYCOLAX) Packet  Take 17 g by mouth daily Dissolved in water or juice        pregabalin (LYRICA) 75 MG capsule Take 150 mg by mouth 3 times daily       risperiDONE (RISPERDAL) 0.5 MG tablet TAKE 1 TAB BY MOUTH AT BEDTIME 30 tablet 5     sennosides (SENOKOT) 8.6 MG tablet Take 1 tablet by mouth 2 times daily        solifenacin (VESICARE) 10 MG tablet TAKE 1 TAB BY MOUTH ONCE DAILY 30 tablet 11     sucralfate (CARAFATE) 1 GM tablet TAKE 1 TAB BY MOUTH FOUR TIMES DAILY. MAY DISSOLVE IN 10ML WATER ISNECESSARY 120 tablet 11     traZODone (DESYREL) 150 MG tablet TAKE 1 TAB BY MOUTH AT BEDTIME 30 tablet 11     umeclidinium (INCRUSE ELLIPTA) 62.5 MCG/INH inhaler Inhale 1 puff into the lungs daily 1 Inhaler 6     VENTOLIN  (90 Base) MCG/ACT inhaler Inhale 2 puffs into the lungs every 6 hours as needed for shortness of breath / dyspnea or wheezing 8.5 g 11     Allergies   Allergen Reactions     Bee Venom      Penicillins Anaphylaxis     Other reaction(s): Skin Rash and/or Hives. Tolerated cefepime in 12/2016     Dilantin [Phenytoin] Other (See Comments)     Generic dilantin only per pt     Iodide Hives     09/11/15: Pt states she can use iodine and doesn't have any problems      Iodine-131      Novocaine [Procaine] Hives     Other reaction(s): Skin Rash and/or Hives     Tositumomab      BP Readings from Last 3 Encounters:   11/04/19 92/52   10/31/19 (!) 146/73   10/28/19 122/71    Wt Readings from Last 3 Encounters:   11/04/19 59 kg (130 lb)   10/28/19 59 kg (130 lb)   10/23/19 59 kg (130 lb)         Reviewed and updated as needed this visit by Provider         Review of Systems   ROS COMP: CONSTITUTIONAL: NEGATIVE for fever, chills, change in weight  INTEGUMENTARY/SKIN: NEGATIVE for worrisome rashes, moles or lesions  EYES: NEGATIVE for vision changes or irritation  ENT/MOUTH: NEGATIVE for ear, mouth and throat problems  RESP: NEGATIVE for significant cough or SOB  CV: NEGATIVE for chest pain, palpitations or peripheral edema  GI:  NEGATIVE for nausea, abdominal pain, heartburn, or change in bowel habits  NEURO: NEGATIVE for weakness, dizziness or paresthesias  ENDOCRINE: NEGATIVE for temperature intolerance, skin/hair changes  HEME: NEGATIVE for bleeding problems  PSYCHIATRIC: NEGATIVE for changes in mood or affect      Objective    BP 98/60   Pulse 83   Temp 98.2  F (36.8  C) (Oral)   Resp 16   Wt 58.5 kg (129 lb)   SpO2 98%   BMI 49.05 kg/m    Body mass index is 49.05 kg/m .  Physical Exam   GENERAL: alert and no distress in wheelchair  EYES: Eyes grossly normal to inspection, PERRL and conjunctivae and sclerae normal  HENT: ear canals and TM's normal, nose and mouth without ulcers or lesions  NECK: no adenopathy, no asymmetry, masses, or scars and thyroid normal to palpation  RESP: lungs clear to auscultation - no rales, rhonchi or wheezes  CV: regular rate and rhythm, normal S1 S2, no S3 or S4, no murmur, click or rub, no peripheral edema and peripheral pulses strong  MS: Bilateral lower extremity above-the-knee amputations are noted  NEURO: Normal strength and tone, mentation intact and speech normal  PSYCH: mentation appears normal, affect normal/bright        Lab Results   Component Value Date    A1C 5.0 04/17/2019    A1C 5.1 06/06/2018    A1C 4.4 05/02/2018    A1C 4.5 08/24/2017    A1C 4.1 01/24/2017       Assessment & Plan     1. Functional incontinence  stable post recent urological procedure.  Continuing with ongoing observation and expectation of improved    2. Chronic pain syndrome  ~Lidoderm patches.  And patient will apply 1 patch for up to 12 hours then remove within a 24-hour period.  - order for DME; Equipment being ordered: air pressure mattress  Dispense: 1 Device; Refill: 0  - lidocaine (LIDODERM) 5 % patch; APPLY 1 PATCHES TO PAINFUL AREA AT ONCE FOR UP TO 12 HOURS WITHIN A 24 HOUR PERIOD REMOVE AFTER 12 HOURS  Dispense: 30 patch; Refill: 1    3. Low back pain, unspecified back pain laterality, unspecified  chronicity, unspecified whether sciatica present  We will follow-up with the pain clinic.  She also asked questions in regards to medical marijuana use.  I informed her that she needs to see a provider that can certify her if she is eligible  - order for DME; Equipment being ordered: air pressure mattress  Dispense: 1 Device; Refill: 0  - lidocaine (LIDODERM) 5 % patch; APPLY 1 PATCHES TO PAINFUL AREA AT ONCE FOR UP TO 12 HOURS WITHIN A 24 HOUR PERIOD REMOVE AFTER 12 HOURS  Dispense: 30 patch; Refill: 1    4. S/P AKA (above knee amputation) bilateral (H)  Air pressure mattress was ordered based on her history of AKA's, back pain and prior history of pressure sores  - order for DME; Equipment being ordered: air pressure mattress  Dispense: 1 Device; Refill: 0    5. Multiple joint pain  Referral sent for patient in regards to assessment  - RHEUMATOLOGY REFERRAL    6.  (E11.51) Type 2 diabetes mellitus with diabetic peripheral angiopathy without gangrene, without long-term current use of insulin (H)  Comment: I have also place orders for the patient to get her lipid and A1c levels done in the December  Plan: Hemoglobin A1c, Lipid Profile and she was sent a letter as a reminder            Tobacco Cessation:   reports that she has been smoking cigarettes and cigars. She has a 7.50 pack-year smoking history. She has never used smokeless tobacco.  Smoking cessation was advised and the risks of continued smoking in regards to this patients health history was reiterated. Options of smoking cessation were also discussed. This time extended beyond the routine exam.    See Patient Instructions    Return in about 1 month (around 12/19/2019) for Lab Work appointment.    Allan Casey MD  [facility]

## 2019-11-19 NOTE — LETTER
[facility]  [address] [address]  [phone]      11/19/2019       Sonya Foote  [address] [address]        To whom it concerns:    Sonya Foote may use her nicotine patches as prescribed.    Sincerely,      Allan Casey MD  Internal Medicine

## 2019-11-20 ENCOUNTER — TELEPHONE (OUTPATIENT)
Dept: INTERNAL MEDICINE | Facility: CLINIC | Age: 69
End: 2019-11-20

## 2019-11-20 ENCOUNTER — THERAPY VISIT (OUTPATIENT)
Dept: SLEEP MEDICINE | Facility: CLINIC | Age: 69
End: 2019-11-20
Payer: COMMERCIAL

## 2019-11-20 DIAGNOSIS — Z86.73 HISTORY OF CVA (CEREBROVASCULAR ACCIDENT): Chronic | ICD-10-CM

## 2019-11-20 DIAGNOSIS — I25.10 CORONARY ARTERY DISEASE INVOLVING NATIVE CORONARY ARTERY OF NATIVE HEART, ANGINA PRESENCE UNSPECIFIED: Chronic | ICD-10-CM

## 2019-11-20 DIAGNOSIS — G89.4 CHRONIC PAIN SYNDROME: Chronic | ICD-10-CM

## 2019-11-20 DIAGNOSIS — G47.39 COMPLEX SLEEP APNEA SYNDROME: ICD-10-CM

## 2019-11-20 DIAGNOSIS — G47.33 OSA (OBSTRUCTIVE SLEEP APNEA): Primary | ICD-10-CM

## 2019-11-20 DIAGNOSIS — Z93.59 SUPRAPUBIC CATHETER (H): Chronic | ICD-10-CM

## 2019-11-20 PROCEDURE — 95811 POLYSOM 6/>YRS CPAP 4/> PARM: CPT | Performed by: INTERNAL MEDICINE

## 2019-11-20 NOTE — TELEPHONE ENCOUNTER
Attempted to reach [facility], no answer; will call tomorrow to gather further information regarding request.      Valeria CACERESN, RN, PHN

## 2019-11-20 NOTE — Clinical Note
Good study, tough one. I don't know all of the techs, so it would help if they put their first initial and last name rather than first name and last initial....If we use TCM, should try and get a baseline before sleep, I though N3 was underscored a bit, and maybe could have titrated a bit faster.....overalll good study though!

## 2019-11-20 NOTE — TELEPHONE ENCOUNTER
Reason for Call: Request for an order or referral:    Order or referral being requested: thrush test    Date needed: as soon as possible    Has the patient been seen by the PCP for this problem? NO    Additional comments: The person from [facility], the pt's residence, abruptly hung up on me before I could get all the information needed.  I did get a fax # of [phone].  I spoke with Tejal about the order.    Phone number Patient can be reached at:  Other phone number: no phone # given.  Best Time:      Can we leave a detailed message on this number?      Call taken on 11/20/2019 at 3:39 PM by DUKE FOSTER

## 2019-11-21 NOTE — PROGRESS NOTES
Completed a all night titration PSG per provider order.    Preliminary AHI 14.  A final therapeutic PAP pressure was achieved.    Supine REM was seen on therapeutic pressure.    Patient reports feeling refreshed in AM.        ROS      Physical Exam

## 2019-11-22 NOTE — TELEPHONE ENCOUNTER
"Spoke with nurse from [place]. Patient is in long term care there. Not in TCU. She is unsure who called looking for thrush test order or why. She thought maybe patient called. They checked with patient and patient said it must have been her  that called. Says patient does not know her number and that Patient usually \"does her own stuff\". Attempted to call patient to get more info and triage but had to leave message to call back.  "

## 2019-11-22 NOTE — TELEPHONE ENCOUNTER
Patient called back.     She said she thought her  called but does not think it was her that called. Maybe one of the nurses. Patient does not see facility providers just Dr. Casey.     Patient reports she has had white spots on tongue for about 1 week. Hurts when she swallows. Patient states she does have a small cut on the corner of her mouth.     Triage spoke with nurse on unit she was not aware this had been asked. So she will complete her own assessment and call back if they need an order.    Ok to fax an order to if they call back: [phone]    Valeria GIFFORD, RN, PHN

## 2019-11-23 ENCOUNTER — TELEPHONE (OUTPATIENT)
Dept: INTERNAL MEDICINE | Facility: CLINIC | Age: 69
End: 2019-11-23

## 2019-11-25 ENCOUNTER — HOSPITAL ENCOUNTER (OUTPATIENT)
Dept: OCCUPATIONAL THERAPY | Facility: CLINIC | Age: 69
Discharge: HOME | End: 2019-11-25
Attending: OCCUPATIONAL THERAPIST | Admitting: OCCUPATIONAL THERAPIST
Payer: COMMERCIAL

## 2019-11-25 PROCEDURE — 97535 SELF CARE MNGMENT TRAINING: CPT | Mod: GO | Performed by: OCCUPATIONAL THERAPIST

## 2019-11-25 NOTE — DISCHARGE INSTRUCTIONS
Visual Rehabilitation Summary 11/25/2019  [facility] -   [phone]  [email or web address]  Light [place] fitover sunglasses for indoor glare and for cloudy days:  Frame number #53, Color # 48 (light [place]ton)  Marking Clothing for Color:  1. Allan the darker of two competing colors with a small safety pin in the tag so you can distinguish them :  a. Allan Black vs. blue  b. Allan red. Vs orange  c.       Allan yellow vs. white  To read the activity calendar and the menu   Use your phone as a magnifier  Or ask for the digital version so you can view it on your iPad or read it with Voiceover  Writing  Use markers for writing.  Toss your pens.   Technology: contact [facility] to schedule technology instruction.   Manuela Mitchell, OTR/L  [phone]

## 2019-11-25 NOTE — TELEPHONE ENCOUNTER
PA Initiation    Medication: lidocaine 5% patch  Insurance Company: [employer] - Phone [phone] Fax [phone]  Pharmacy Filling the Rx: [place], [place] - [address] [place] [address]  Filling Pharmacy Phone: [phone]  Filling Pharmacy Fax:    Start Date: 11/25/2019    Central Prior Authorization Team   Phone: [phone]        Awaiting additional questions via CMM

## 2019-11-26 NOTE — TELEPHONE ENCOUNTER
PRIOR AUTHORIZATION DENIED    Medication: lidocaine 5% patch    Denial Date: 11/26/2019    Denial Rational: Lidoderm patches are only covered with the diagnosis of post-herpetic neuralgia, diabetic neuropathy, or cancer related pain. It will not be covered for the associated diagnosis.         Appeal Information:

## 2019-11-26 NOTE — PROCEDURES
" SLEEP STUDY INTERPRETATION  TITRATION STUDY      Patient: SHARATH MERCEDES  YOB: 1950  Study Date: 11/20/2019  MRN: [id number]  Referring Provider: Gustavo Wrihgt MD  Ordering Provider: Gustavo Wright MD    Indications for Polysomnography: The patient is a 69 y old Female who is 3' 7\" and weighs 130.0 lbs. Hosston sleepiness scale 5.0 and neck circumference is 34.0 cm. A polysomnogram with PAP titration was performed to correct for history of mild Sleep obstructive sleep apnea by sleep study 2012.  History of complex apnea with 'treatment emergent central apneas' by titration study 2012. Treated with initially ASV, later PAP after EF worsened. Last study 2/2017 suggested severe incompletely treated obstructive sleep apnea at CPAP 12 cmH20.  Did not meet compliance for her new machine needs to be restudied to re-qualify for PAP.  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 Respiratory parameters of chest and abdominal movements were recorded with respiratory inductance plethysmography. Oxygen saturation was recorded by pulse oximetry. Hypopnea scoring rule used: 1B 4%.    Treatment PSG  Sleep Architecture:   The total recording time of the polysomnogram was 517.9 minutes. The total sleep time was 489.0 minutes. Sleep latency was decreased at 0.5 minutes with the use of a sleep aid (trazadone 150 mg). REM latency was 122.0 minutes. Arousal index was increased at 41.2 arousals per hour. Sleep efficiency was normal at 94.4%. Wake after sleep onset was 28.5 minutes. The patient spent 7.0% of total sleep time in Stage N1, 71.9% in Stage N2, 4.0% in " Stage N3, and 17.2% in REM. Time in REM supine was 84.0 minutes.    Respiration:     The patient was titrated at pressures ranging from CPAP 5 cmH2O up to CPAP 12 cmH2O. The final pressure achieved was CPAP 12 cmH2O for a short period. Supine REM was seen at 11 cmH20 with fair control of events, but persistent RERAs, snoring, and airflow limitation.     This titration was considered acceptable    Respiratory rate and pattern - Events often appeared periodic, and cheyyne-fang morphology was suggested at times, but periodicity was not well consolidated and circulation times were not delayed.      Sustained Sleep Associated Hypoventilation - Transcutaneous carbon dioxide monitoring was used, however no baseline reading was attained. The TCM stabilized at 44mHg in early sleep, and reached a peak of 51 mmHg in the last REM period. Two higher spikes in TCM were present and were attributed to sensor error.      Sleep Associated Hypoxemia - (Greater than 5 minutes O2 sat at or below 88%) was not present. Baseline oxygen saturation was 95.5%. Lowest oxygen saturation was 83.3%. Time spent less than or equal to 88% was 0.7 minutes. Time spent less than or equal to 89% was 2.5 minutes.    Movement Activity:     Periodic Limb Activity - There were 0 PLMs during the entire study.    REM EMG Activity - Excessive transient/sustained muscle activity was not present.    Nocturnal Behavior - Abnormal sleep related behaviors were not noted     Bruxism - None apparent.    Cardiac Summary:. Arrhythmias were not noted.        Assessment:     Acceptable titration    Recommendations:    Treatment of JOSE with Auto?titrating PAP therapy with a range of 12  cmH2O to 15  cmH2O. Recommend clinical follow up with sleep management team, including review of compliance measures.    A nasal mask with chin strap is strongly advised.     Suggest optimizing sleep schedule     Diagnostic Codes:   Obstructive Sleep Apnea G47.33        _____________________________________   Electronically Signed By: Gustavo Wright MD 11/23/19         Range(%) Time in range (min)   0.0 - 89.0 2.5   0.0 - 88.0 0.7         Stage Min(mm Hg) Max(mm Hg)   Wake 38.5 50.9   NREM(1+2+3) 20.0 50.9   REM 42.8 51.9         Range(mmHg) Time in range (min)   55.0 - 100.0 -   Excluded data <20.0 & >70.0 51.1

## 2019-11-26 NOTE — TELEPHONE ENCOUNTER
This plan is not enabled for ePA. Manually faxed prior authorization form to [facility] at fax# [phone]

## 2019-11-30 NOTE — PROGRESS NOTES
- Neuro: Patient remained at neurologic baseline throughout admission.  - CV: Patient hemodynamically stable while admitted.  - Resp: Patient remained stable on room air during hospitalization.  - FEN/GI: Patient admitted to PICU for DKA with known underlying T1DM from OSH. She was started on insulin drip and 2 bag IVF per protocol. Labs were followed closely with noted improvement prompting transition to basal/bolus insulin per Endocrinology guidance. Stepdown to floor on 11/29. She received Levemir long acting while in-house (although on home Tresiba 14u) with insulin short acting sliding scale and carb correction. Discussed labs with endocrine team 11/30, stable for discharge home with plans to follow up with their team as outpatient.  Home regimen per endo: Tresiba 14u qdaily AM, Short acting carbs 1U per 10g, CF sliding scale 1U per 50 > 120 day and 1U per 50 > 150 overnight  - Heme/ID: Patient with UTI symptoms and OSH urine culture with 5-50 WBC prompting initiation of Rocephin now s/p 3 days treatment while following culture. OSH urine culture no significant growth (no colonies in abundance per Microbiologist) and no further abx warranted.   1-9-18  CC met with EDWARDO Ayala CC with member at her home to complete ACP documentation.  ACP discussion with member today, she had a good understanding of her wishes, as she has recently been through this with the loss of her wife.  Sonya opted for the ACP short form indicating her son and daughter at agents.  Also member wished to have code status change to DNR/DNI.  Assisted her with completion of the POLST.    ACP short form sent to [facility] ACP team via email.  POLST form sent to [facility] for scanning and routing to Dr. Casey.  Meredith Perry RN PHN  [facility]   [phone]

## 2019-12-01 NOTE — PROGRESS NOTES
11/25/19 1300   Signing Clinician's Name / Credentials   Signing clinician's name / credentials Manuela Mitchell OTR/EMILE   Session Number   Session Number 4   Reporting period 10/24/2019-01/16/2020   Recertification   Recertification Due 01/16/20   Progress Notes   Progress Note Due 01/16/20   GOALS   Goals Near Vision;Visual Field;Environmental Modification;Resource Education;Distance Viewing   Goals Addressed this Session Near vision   Goal 1 - Near Vision   Goal Description: Near Vision Patient will verbalize awareness of visual field Loss and demonstrate improved use of visual skills/adaptive equipment for increased independence in reading-based activities of daily living, written communication and detail ADL tasks.   Near Vision Goal Comment pt met goals with iphone magniifer and Seeing  applications.  Met writing goals with use of marker vs. pens   Target Date 01/16/20   Date Met 11/25/19   Goal 2 - Visual field   Goal Description: Visual field Patient will verbalize awareness of visual field loss and demonstrate improved use of visual skills for increased safety/ independence in locating objects/obstacles and in navigation   Visual Field Goal Comment goal deferred. Pt will meet with [facility] for orientatioin and mobility training in setting of decreased visual field   Target Date 01/16/20   Goal 3 - Environmental modification   Goal Description: Environment modification Patient will demonstrate understanding of the impact of lighting, contrast and glare on ADL activities, in conjunction with environmentally-based ADL modifications   Environmental Modification Goal Comment Met with light [place] glasses to manage indoor glare, use of safety pins for id of clothing colors, Use of bowls for eating    Target Date 01/16/20   Date Met 11/25/19   Goal 4 - Resource education   Goal Description: Resource education Patient will verbalize awareness of community resources for the following:;Access to low vision devices  "  Resource Education Goal Comment goal met as written   Target Date 01/16/20   Date Met 11/25/19   Goal 5 - Distance viewing   Goal Description: Distance viewing Patient will demonstrate proficient use of a distance device in conjunction with appropriate visual skills techniques to correctly survey objects in the distance   Distance Viewing Goal Comment pt reports she already has a monocular that is effective for spot reaidg in the distance. Goal discharged   Target Date 01/16/20       Present No   Therapy Diagnosis   Therapy  Diagnosis: Impaired ADL/IADL with deficits in Reading based ADL;Written communication;Financial management  (dressing, locating objects in home, technology use, glare )   Subjective Report   Subjective Report \"I am satisfied that I have met my goals here\"   Visual Rehab Treatment [place]   Daily Treatment [place] Self Care/Home Management   Self Care/Home Management   Self-Care/Home Mgmt/ADL, Compensatory, Meal Prep Minutes (80477) 60 Minutes   Skilled Intervention Training in use of electronic aids for reading;Training in written communication strategies, writing implements;Training in written communication strategies, use of large print to increase visibility and clarity;Instruction in methods for enhancing contrast;Trial of tinted eye glasses to manage symptoms of glare  (clothing color ID strategies taught)   Patient Response pt verbalized and demonstrated excellent understanding   Treatment Detail Management of indoor glare: pt trialed filters for indoor glare management. Reported increased visual clarity and comfort with Noir #48 light [place] tint. Identfiied frame 53 (Noir) as optimal fit. Verbalized plan to have son order for her. Contact informatioin issued. Reading activity calendar/facility menus: instructed in utilizing cell phone magnifying function for reading of these documents. Pt demonstrated ind. reading target print sizes with cell phone magniifer. " Written communication: pt reports she cannot consistently read her own writing. Provided instruction in using bold markers and print vs. cursive for legible writing. Pt demonstrated consistent reading of own writing with these strategies.    [place] Read   Smallest print size read all with iphone magnifier    Critical print size all with iphone magniifer   Equipment/Resources   Written resources provided   (written summary of session/resources)   Education   Learner Patient   Readiness Eager   Method Demonstration   Response Demonstrates understanding   Communication with other professionals   Communication with other professionals per EHR   Plan   Plan for next session Discharge: goals met   Total Session Time   Timed Code Treatment Minutes 60   Total Treatment Time (sum of timed and untimed services) 60

## 2019-12-01 NOTE — PROGRESS NOTES
Reason for visit:  Follow up of Botox injection and incontinence    HPI:  Sonya Foote is a 69 year old female with an SP tube for functional incontinence (had bilateral AKA's so could not transfer to toilet fast enough).   She has a complex medical history including PVD, bilateral AKA, chronic pain, chronic systolic CHF, HTN, DM2, CVA, seizure disorder, and schizoaffective disorder.   Her SP was placed 1/2018 due to urgency and UUI and functional incontinence due to immobility.     Recently seen for complaints including: despite SP tube urinates 3-4x per day.  Had abdominal pain and flank pain and bladder spasms.    She has recurrent UTIs - 5-6x in the last year.  Has tried vaginal estrogen cream but did not work.  She has a history of open angle glaucoma and significant peripheral vascular disease so she was deemed a poor candidate for OAB medications.     Had bladder Botox injection 10/31/19 and SP tube change   Today reports improvement. Spasms were happening every day but now they are 2x per week.   Now urinating 1x per day from urethra and this occurs rarely during the week  She is very happy with her result at this point.      Past medical, surgical, family, social, allergic, and medication history reviewed  ROS -see hpi otherwise rest is negative       OBJECTIVE:  Vitals:    12/02/19 1427   BP: (!) 155/79   BP Location: Left arm   Patient Position: Sitting   Cuff Size: Adult Regular   Pulse: 87     Constitutional: healthy, alert and no distress, in wheelchair  Cardiovascular: regular rate, normal perfusion  Respiratory: normal work of breathing  Psychiatric: mentation appears normal and affect normal/bright  Head: Normocephalic. EOMI. Moist membranes  : SP tube with yellow urine  SKIN: Soft, dry  JOINT/EXTREMITIES: bilateral LE surgically absent      Assessment/Plan: Sonya Foote is a 69 year old female with SP tube for functional incontinence (bilateral AKA's so poor mobility) and OAB symptoms s/p Botox  injection 1 month ago. Much improved though still getting rare spasms with leakage    -- Repeat Botox injection in 5 months  -- Plan for increased dose 300U since she still has some spasms and leaking      Citlali Hebert MD  Reconstructive Urology Fellow

## 2019-12-02 ENCOUNTER — TELEPHONE (OUTPATIENT)
Dept: RHEUMATOLOGY | Facility: CLINIC | Age: 69
End: 2019-12-02

## 2019-12-02 ENCOUNTER — OFFICE VISIT (OUTPATIENT)
Dept: UROLOGY | Facility: CLINIC | Age: 69
End: 2019-12-02
Payer: COMMERCIAL

## 2019-12-02 VITALS — DIASTOLIC BLOOD PRESSURE: 79 MMHG | SYSTOLIC BLOOD PRESSURE: 155 MMHG | HEART RATE: 87 BPM

## 2019-12-02 DIAGNOSIS — N31.9 NEUROGENIC BLADDER: Primary | ICD-10-CM

## 2019-12-02 DIAGNOSIS — N32.89 BLADDER SPASMS: ICD-10-CM

## 2019-12-02 RX ORDER — CIPROFLOXACIN 500 MG/1
500 TABLET, FILM COATED ORAL
Status: DISCONTINUED | OUTPATIENT
Start: 2019-12-02 | End: 2020-05-14

## 2019-12-02 ASSESSMENT — PAIN SCALES - GENERAL: PAINLEVEL: EXTREME PAIN (8)

## 2019-12-02 NOTE — TELEPHONE ENCOUNTER
Health Call Center    Phone Message    May a detailed message be left on voicemail: yes    Reason for Call: Other: DX: Multiple joint pain and referred by Dr. Allan Casey, per patient.  All records in [facility], per patient.  Please follow up with patient for Intake Process.  Thank you!     Action Taken: Message routed to:  [facility]

## 2019-12-02 NOTE — LETTER
December 10, 2019    Sonya Foote  [address]  [facility] [address]    Dear Referring Provider,  Thank you for the referral. After careful review by the Rheumatologist, your patient does not meed the criteria for an appointment. We encourage you to refer your patient to one of our community sites:    [facility]    Provider of your choice  Thank you for your support and understanding.    Sincerely,  The Division of Arthritis and Autoimmune Disorders

## 2019-12-02 NOTE — NURSING NOTE
Chief Complaint   Patient presents with     Follow Up     4 week follow up       Blood pressure (!) 155/79, pulse 87, not currently breastfeeding. There is no height or weight on file to calculate BMI.    Patient Active Problem List   Diagnosis     Seizure disorder (H)     Osteoporosis     Schizoaffective disorder (H)     AS (sickle cell trait) (H)     Vertigo     Person who has had sex change operation     Intestinal malabsorption     Chronic neck pain     Chronic pain syndrome     Hx of colonic polyp     Iron deficiency anemia     Degeneration of intervertebral disc of lumbosacral region     Thoracic or lumbosacral neuritis or radiculitis     Androgen insensitivity syndrome     PAD (peripheral artery disease) (H)     Stenosis of carotid artery     Osteoarthritis     Essential hypertension     Type 2 diabetes mellitus with diabetic peripheral angiopathy without gangrene, without long-term current use of insulin (H)     Anxiety disorder     Lumbosacral radiculitis     Peripheral neuropathy     Primary open angle glaucoma of both eyes, severe stage     Pseudophakia of right eye     Cataract, left eye     Diabetes mellitus type 2 without retinopathy (H)     Simple chronic bronchitis (H)     JOSE (obstructive sleep apnea)-      Complex sleep apnea syndrome     Coronary artery disease involving native coronary artery of native heart     Bee sting reaction, undetermined intent, subsequent encounter     Functional incontinence     History of recurrent UTIs     Dysphagia     Incontinence     Migraine headache     History of CVA (cerebrovascular accident)     Tobacco abuse     S/P AKA (above knee amputation) bilateral (H)     Chronic, continuous use of opioids     Other neuromuscular dysfunction of bladder     Hyperlipidemia LDL goal <100     Blind left eye     Chronic back pain     Legally blind in right eye, as defined in [place]     Polypharmacy     Esophageal reflux     Suprapubic catheter (H)       Allergies   Allergen  Reactions     Bee Venom      Penicillins Anaphylaxis     Other reaction(s): Skin Rash and/or Hives. Tolerated cefepime in 12/2016     Dilantin [Phenytoin] Other (See Comments)     Generic dilantin only per pt     Iodide Hives     09/11/15: Pt states she can use iodine and doesn't have any problems      Iodine-131      Novocaine [Procaine] Hives     Other reaction(s): Skin Rash and/or Hives     Tositumomab        Current Outpatient Medications   Medication Sig Dispense Refill     ACETAMINOPHEN PO Take 1,000 mg by mouth every 6 hours as needed for pain Not to exceed 4000 mg/day       ADVAIR DISKUS 250-50 MCG/DOSE diskus inhaler Inhale 1 puff into the lungs 2 times daily 60 Inhaler 11     albuterol (PROVENTIL) (2.5 MG/3ML) 0.083% neb solution INHALE 1 VIAL VIA NEBULIZER EVERY 6 HOURS AS NEEDED 360 mL 11     alendronate (FOSAMAX) 70 MG tablet Take 1 tablet (70 mg) by mouth with 8oz water every 7 days 30 minutes before breakfast and remain upright during this time. 12 tablet 3     aspirin EC 81 MG EC tablet Take 1 tablet (81 mg) by mouth daily 90 tablet 3     atorvastatin (LIPITOR) 40 MG tablet TAKE 1 TAB BY MOUTH ONCE DAILY 30 tablet 11     blood glucose monitoring (ONE TOUCH ULTRA 2) meter device kit Use to test blood sugars 3 times daily or as directed. 1 kit 0     blood glucose monitoring (ONE TOUCH ULTRA) test strip Use to test blood sugars 3 times daily or as directed. 3 Box 3     blood glucose monitoring (ONE TOUCH ULTRASOFT) lancets Use to test blood sugar 3 times daily or as directed. 100 each PRN     brimonidine (ALPHAGAN) 0.2 % ophthalmic solution Place 1 drop into the right eye 2 times daily 1 Bottle 11     cetirizine (ZYRTEC) 10 MG tablet Take 1 tablet (10 mg) by mouth every evening 30 tablet 3     Cholecalciferol (VITAMIN D) 2000 UNITS tablet Take 2,000 Units by mouth daily. 100 tablet 3     clotrimazole (LOTRIMIN) 1 % cream INSERT 1 APPLICATORFUL VAGINALLY TWICE A DAY FOR VAGINAL PAIN 12 g 0      clotrimazole (LOTRIMIN) 1 % external cream Apply topically 2 times daily 12 g 0     diclofenac (VOLTAREN) 1 % GEL topical gel Apply 2 g topically 4 times daily to hands 100 g 11     dorzolamide (TRUSOPT) 2 % ophthalmic solution Place 1 drop into the right eye 2 times daily 1 Bottle 11     EPINEPHrine (EPIPEN/ADRENACLICK/OR ANY BX GENERIC EQUIV) 0.3 MG/0.3ML injection 2-pack Inject 0.3 mLs (0.3 mg) into the muscle as needed for anaphylaxis 0.6 mL 1     escitalopram (LEXAPRO) 10 MG tablet TAKE 1 TAB BY MOUTH ONCE DAILY 30 tablet 11     ferrous sulfate (IRON) 325 (65 FE) MG tablet Take 1 tablet (325 mg) by mouth 2 times daily With meals 60 tablet 2     fluticasone (FLONASE) 50 MCG/ACT nasal spray Spray 1 spray into both nostrils daily       lactulose (CHRONULAC) 10 GM/15ML solution Take 20 g by mouth as needed for constipation       latanoprost (XALATAN) 0.005 % ophthalmic solution Place 1 drop into both eyes At Bedtime 2.5 mL 11     levETIRAcetam (KEPPRA) 500 MG tablet TAKE 1 TAB BY MOUTH TWICE A DAY 60 tablet 5     lidocaine (LIDODERM) 5 % patch APPLY 1 PATCHES TO PAINFUL AREA AT ONCE FOR UP TO 12 HOURS WITHIN A 24 HOUR PERIOD REMOVE AFTER 12 HOURS 30 patch 1     lisinopril (PRINIVIL/ZESTRIL) 20 MG tablet TAKE 1 TAB BY MOUTH ONCE DAILY 30 tablet 11     metFORMIN (GLUCOPHAGE) 500 MG tablet TAKE 1 TAB BY MOUTH IN THE EVENING WITH DINNER 30 tablet 5     metoprolol succinate ER (TOPROL-XL) 50 MG 24 hr tablet TAKE 1 TAB BY MOUTH ONCE DAILY 30 tablet 11     nicotine (NICODERM CQ) 14 MG/24HR 24 hr patch Place 1 patch onto the skin every 24 hours 30 patch 3     nitroglycerin (NITROSTAT) 0.4 MG SL tablet Place 1 tablet (0.4 mg) under the tongue every 5 minutes as needed for chest pain if you are still having symptoms after 3 doses (15 minutes) call 911. 25 tablet 1     ondansetron (ZOFRAN-ODT) 8 MG ODT tab Take 1 tablet (8 mg) by mouth every 8 hours as needed for nausea 30 tablet 0     order for DME autoPAP 12-15 cmH20 1  Device 0     order for DME Equipment being ordered: air pressure mattress 1 Device 0     order for DME Equipment being ordered: Prosthetic legs 2 each 0     order for DME Equipment being ordered: Prosthetic 1 each 0     order for DME Equipment being ordered: lift recliner 1 Device 0     order for DME Equipment being ordered: Hospital Bed with side rails 1 each 0     order for DME Equipment being ordered: Power Wheel Chair 1 Device 0     order for DME Equipment being ordered: bandage tape, prefer paper 1 Package 0     order for DME Full electric hospital bed with half rails    Dx: O56434, I110, J449  Length of need: lifetime 1 Device 0     order for DME Wheel Chair Cushion: 18 x 18 inch Roho cushion 1 Device 0     order for DME Hospital bed with side rails 1 Device 0     order for DME Equipment being ordered: CPAP supplies mask, hose, filters, cushion    fax to [facility] at [phone] 10 Device prn     order for DME Equipment being ordered: CPAP supplies mask, hose, filters, cushion fax to [facility] at [phone]  Disp: 10 Device Refills: prn   Class: Local Print Start: 2/10/2017 1 Device 0     order for DME Equipment being ordered: Nebulizer and tubing supplies 1 Units 0     order for DME Equipment being ordered: Glucerna daily shakes with each meal 1 Box 11     ORDER FOR DME Equipment being ordered: Power Wheelchair 1 Device 0     ORDER FOR DME Equipment being ordered: Depends briefs 1 Month 11     oxyCODONE (ROXICODONE) 5 MG tablet TAKE 1 TABLET BY MOUTH EVERY 6 HOURS AS NEEDED  0     pantoprazole (PROTONIX) 40 MG EC tablet TAKE 1 TAB BY MOUTH ONCE DAILY 30 tablet 11     phenytoin (DILANTIN) 100 MG capsule TAKE 2 CAPS (200MG) BY MOUTH TWICE A  capsule 11     polyethylene glycol (MIRALAX/GLYCOLAX) Packet Take 17 g by mouth daily Dissolved in water or juice        pregabalin (LYRICA) 75 MG capsule Take 150 mg by mouth 3 times daily       risperiDONE (RISPERDAL) 0.5 MG tablet TAKE 1 TAB BY MOUTH  AT BEDTIME 30 tablet 5     sennosides (SENOKOT) 8.6 MG tablet Take 1 tablet by mouth 2 times daily        solifenacin (VESICARE) 10 MG tablet TAKE 1 TAB BY MOUTH ONCE DAILY 30 tablet 11     sucralfate (CARAFATE) 1 GM tablet TAKE 1 TAB BY MOUTH FOUR TIMES DAILY. MAY DISSOLVE IN 10ML WATER ISNECESSARY 120 tablet 11     traZODone (DESYREL) 150 MG tablet TAKE 1 TAB BY MOUTH AT BEDTIME 30 tablet 11     umeclidinium (INCRUSE ELLIPTA) 62.5 MCG/INH inhaler Inhale 1 puff into the lungs daily 1 Inhaler 6     VENTOLIN  (90 Base) MCG/ACT inhaler Inhale 2 puffs into the lungs every 6 hours as needed for shortness of breath / dyspnea or wheezing 8.5 g 11       Social History     Tobacco Use     Smoking status: Current Every Day Smoker     Packs/day: 0.25     Years: 30.00     Pack years: 7.50     Types: Cigarettes, Cigars     Smokeless tobacco: Never Used   Substance Use Topics     Alcohol use: No     Alcohol/week: 0.0 standard drinks     Drug use: No       JASON Shepard  12/2/2019  2:35 PM

## 2019-12-02 NOTE — LETTER
12/2/2019       RE: Sonya Foote  [address]  [place] [address]     Dear Colleague,    Thank you for referring your patient, Sonya Foote, to the [facility] PR[facility] UROLOGIC CANCERS at [facility]. Please see a copy of my visit note below.    Reason for visit:  Follow up of Botox injection and incontinence    HPI:  Sonya Foote is a 69 year old female  with an SP tube for functional incontinence (had bilateral AKA's so could not transfer to toilet fast enough).   She has a complex medical history including PVD, bilateral AKA, chronic pain, chronic systolic CHF, HTN, DM2, CVA, seizure disorder, and schizoaffective disorder.   Her SP was placed 1/2018 due to urgency and UUI and functional incontinence due to immobility.     Recently seen for complaints including: despite SP tube urinates 3-4x per day.  Had abdominal pain and flank pain and bladder spasms.    She has recurrent UTIs - 5-6x in the last year.  Has tried vaginal estrogen cream but did not work.  She has a history of open angle glaucoma and significant peripheral vascular disease so she was deemed a poor candidate for OAB medications.     Had bladder Botox injection 10/31/19 and SP tube change   Today reports improvement. Spasms were happening every day but now they are 2x per week.   Now urinating 1x per day from urethra and this occurs rarely during the week  She is very happy with her result at this point.      Past medical, surgical, family, social, allergic, and medication history reviewed  ROS -see hpi otherwise rest is negative       OBJECTIVE:  Vitals:    12/02/19 1427   BP: (!) 155/79   BP Location: Left arm   Patient Position: Sitting   Cuff Size: Adult Regular   Pulse: 87     Constitutional: healthy, alert and no distress, in wheelchair  Cardiovascular: regular rate, normal perfusion  Respiratory: normal work of breathing  Psychiatric: mentation appears normal and affect  normal/bright  Head: Normocephalic. EOMI. Moist membranes  : SP tube with yellow urine  SKIN: Soft, dry  JOINT/EXTREMITIES: bilateral LE surgically absent      Assessment/Plan: Sonya Foote is a 69 year old female with SP tube for functional incontinence (bilateral AKA's so poor mobility) and OAB symptoms s/p Botox injection 1 month ago. Much improved though still getting rare spasms with leakage    -- Repeat Botox injection in 5 months  -- Plan for increased dose 300U since she still has some spasms and leaking      Citlali Hebert MD  Reconstructive Urology Fellow    Again, thank you for allowing me to participate in the care of your patient.      Sincerely,    Ghazala Hebert MD

## 2019-12-05 ENCOUNTER — DOCUMENTATION ONLY (OUTPATIENT)
Dept: SLEEP MEDICINE | Facility: CLINIC | Age: 69
End: 2019-12-05

## 2019-12-05 ENCOUNTER — TELEPHONE (OUTPATIENT)
Dept: INTERNAL MEDICINE | Facility: CLINIC | Age: 69
End: 2019-12-05

## 2019-12-05 DIAGNOSIS — G47.39 COMPLEX SLEEP APNEA SYNDROME: ICD-10-CM

## 2019-12-05 DIAGNOSIS — G47.33 OSA (OBSTRUCTIVE SLEEP APNEA): ICD-10-CM

## 2019-12-05 NOTE — TELEPHONE ENCOUNTER
[facility] and [facility] calling [phone]    Se reviewed pt's med list and noted some duplicates.    Albuteral inhaler on list twice, one with directions for every 6 hours as needed, and the other every 8 hours as needed.  Can they discontinue the 8 hour one?    Albuterol nebulizer also on list twice.  One with directions for every 4 hours as needed, the other every 6 hours as needed.  Can they discontinue the 6 hour one?

## 2019-12-05 NOTE — PROGRESS NOTES
All required documents received for 2nd 12-week CPAP trial.  Called patient and scheduled CPAP setup at [facility] on December 10, 2019 at 2:00PM.  Patient will receive the same device she had before [employer] DreamStation (device/supplies saved in DME closet).   Physician notes from 11/04/19 state patient's mask is leaking, comes apart and the velcro is worn out.  While on the phone, patient stated that provider suggested she try a Nasal mask.  Let patient know Medicare guidelines; they will not pay for new mask/supplies until patient passes compliance.  [facility] cannot dispense a new mask unless patient pays out-of-pocket (sold at a 30% discount a mask would be roughly $180-250, depending on style).  Ernestine Lagunas

## 2019-12-06 ENCOUNTER — TELEPHONE (OUTPATIENT)
Dept: PULMONOLOGY | Facility: CLINIC | Age: 69
End: 2019-12-06

## 2019-12-06 NOTE — TELEPHONE ENCOUNTER
Clotrimazole 1% cream needs stop date. Patient has not been using it on regular basis. Ok to have stop date?    also      Pharmacy recommends dose reduction on 150mg dose of Trazodone;they would like to try tapering patient down to 100mg  at bedtime and monitor any behavior changes    If ok, please advise and follow up with Rehab facility.     Valeria CACERESN, RN, PHN

## 2019-12-06 NOTE — TELEPHONE ENCOUNTER
Left voicemail with patient to return call to discuss nebulizer.    Spoke with patient and explained to use Albuterol nebulizer 4 times a day as needed. Patient confirmed understanding.

## 2019-12-06 NOTE — TELEPHONE ENCOUNTER
[facility] Call Center    Phone Message    May a detailed message be left on voicemail: yes    Reason for Call: Other: pt is confused on how many times he needs to be using his nebulizer, please call to oswald further     Action Taken: Message routed to:  [facility]

## 2019-12-09 NOTE — TELEPHONE ENCOUNTER
Informed [facility] nursing staff. No further questions at this time.    Valeria CACERESN, RN, PHN

## 2019-12-10 ENCOUNTER — DOCUMENTATION ONLY (OUTPATIENT)
Dept: SLEEP MEDICINE | Facility: CLINIC | Age: 69
End: 2019-12-10
Payer: COMMERCIAL

## 2019-12-10 DIAGNOSIS — G47.33 OSA (OBSTRUCTIVE SLEEP APNEA): ICD-10-CM

## 2019-12-10 DIAGNOSIS — G47.39 COMPLEX SLEEP APNEA SYNDROME: ICD-10-CM

## 2019-12-10 NOTE — PROGRESS NOTES
Patient was offered choice of vendor and chose [facility].  Sonya Foote was set up at [facility] on December 10, 2019 Patient received a [employer] DreamStation Auto.  Same machine patient had during first trial; no Encore updates needed.  Pressures were set at 12-15 cm H2O.   Patient s ramp is 6 cm H2O for 30 min and FLEX/EPR is C Flex, 2.  Patient received a Resmed Mask name: Airfit N20 Nasal mask Size For Her, Small (per patient, provider recommended she try Nasal mask), [employer] Premium chin strap (black), heated tubing and heated humidifier.  Patient is enrolled in the STM Program and does need to meet compliance. Patient has a follow up on 02/10/20 with Dr. Wright.    Ernestine Lagunas

## 2019-12-12 RX ORDER — PREGABALIN 75 MG/1
150 CAPSULE ORAL 3 TIMES DAILY
Qty: 180 CAPSULE | Refills: 0 | Status: CANCELLED | OUTPATIENT
Start: 2019-12-12

## 2019-12-12 NOTE — TELEPHONE ENCOUNTER
Routing refill request to provider for review/approval because:  Drug not on the [facility] refill protocol   Medication considered historical        Valeria CACERESN, RN, PHN

## 2019-12-12 NOTE — TELEPHONE ENCOUNTER
[facility] nursing staff.    No further questions at this time.     Valeria CACERESN, RN, PHN

## 2019-12-12 NOTE — TELEPHONE ENCOUNTER
Upon review of the patient's medication list it does not appear that this prescription as ordered has been prescribed by me.

## 2019-12-12 NOTE — TELEPHONE ENCOUNTER
Reason for Call:  Other Medication request    Detailed comments: Richard from [facility] where the patient stays called and is requesting that Dr. Casey write a new prescription for pregabalin (LYRICA) 150 mgs for the patient. He is requesting that it be faxed to the [facility] in [place] at [phone].     Phone Number Patient can be reached at: Other phone number: [phone]    Best Time: Any    Can we leave a detailed message on this number? Not Applicable    Call taken on 12/12/2019 at 12:11 PM by Ginger Otero

## 2019-12-13 ENCOUNTER — DOCUMENTATION ONLY (OUTPATIENT)
Dept: SLEEP MEDICINE | Facility: CLINIC | Age: 69
End: 2019-12-13

## 2019-12-13 DIAGNOSIS — G47.33 OSA (OBSTRUCTIVE SLEEP APNEA): ICD-10-CM

## 2019-12-13 DIAGNOSIS — G47.39 COMPLEX SLEEP APNEA SYNDROME: ICD-10-CM

## 2019-12-13 NOTE — PROGRESS NOTES
3 DAY STM VISIT    Diagnostic AHI: 14 PSG    Patient contacted for 3 day STM visit  Subjective measures:  Pt states that things are going fine.  She feels like she's able to wear it longer and has no issues with the mask.      Replacement device: No  STM ordered by provider: Yes     Device type: Auto-CPAP  PAP settings from order::  CPAP min 12 cm  H20       CPAP max 15 cm  H20  Mask type:    Nasal Mask      Assessment: No usage reporting but has a profile in Airview/Encore.-Serial number wasn't put into pt's account at setup. Added serial number to pt's account.  Action plan: Patient to have 14 day STM visit. Patient has a follow up visit scheduled:   yes within 31-90 days of set up.    Total time spent on accessing, reviewing and interpreting remote patient PAP therapy data:   12 minutes      Total time spent with direct patient communication :   3 minutes

## 2019-12-16 DIAGNOSIS — E11.51 TYPE 2 DIABETES MELLITUS WITH DIABETIC PERIPHERAL ANGIOPATHY WITHOUT GANGRENE, WITHOUT LONG-TERM CURRENT USE OF INSULIN (H): ICD-10-CM

## 2019-12-16 LAB
CHOLEST SERPL-MCNC: 162 MG/DL
HBA1C MFR BLD: 5 % (ref 0–5.6)
HDLC SERPL-MCNC: 49 MG/DL
LDLC SERPL CALC-MCNC: 95 MG/DL
NONHDLC SERPL-MCNC: 113 MG/DL
TRIGL SERPL-MCNC: 92 MG/DL

## 2019-12-16 PROCEDURE — 80061 LIPID PANEL: CPT | Performed by: INTERNAL MEDICINE

## 2019-12-16 PROCEDURE — 83036 HEMOGLOBIN GLYCOSYLATED A1C: CPT | Performed by: INTERNAL MEDICINE

## 2019-12-16 PROCEDURE — 36415 COLL VENOUS BLD VENIPUNCTURE: CPT | Performed by: INTERNAL MEDICINE

## 2019-12-18 ENCOUNTER — TELEPHONE (OUTPATIENT)
Dept: INTERNAL MEDICINE | Facility: CLINIC | Age: 69
End: 2019-12-18

## 2019-12-18 ENCOUNTER — TRANSFERRED RECORDS (OUTPATIENT)
Dept: HEALTH INFORMATION MANAGEMENT | Facility: CLINIC | Age: 69
End: 2019-12-18

## 2019-12-18 NOTE — TELEPHONE ENCOUNTER
Nurse from [facility] calling.  [phone]    Pt has been complaining of right wrist pain for the last 2 weeks.  No known injury, no swelling.  Pt requesting order for xray.  Says she has had surgery on it a long time ago.  \\    Ok to do xray or should pt be seen?

## 2019-12-19 ENCOUNTER — TELEPHONE (OUTPATIENT)
Dept: UROLOGY | Facility: CLINIC | Age: 69
End: 2019-12-19

## 2019-12-20 NOTE — TELEPHONE ENCOUNTER
Called to schedule surgery with Dr Gordon. Left voice mail for patient to call back at [phone]

## 2019-12-23 ENCOUNTER — TELEPHONE (OUTPATIENT)
Dept: UROLOGY | Facility: CLINIC | Age: 69
End: 2019-12-23

## 2019-12-23 PROBLEM — N31.9 NEUROGENIC BLADDER: Status: ACTIVE | Noted: 2019-12-23

## 2019-12-23 NOTE — TELEPHONE ENCOUNTER
Patient is scheduled for surgery with Dr. Gordon      Spoke or left message with: left voice mail with surgery information and for a call back at [phone]    Date of Surgery: 5/21/20    Location: [facility] OR    Informed patient they will need an adult  yes    Pre-op with surgeon (if applicable): n/a    H&P: Scheduled with pcp    Additional imaging/appointments: n/a    Surgery packet: mailed 12/23/19     Additional comments: n/a

## 2019-12-24 ENCOUNTER — TELEPHONE (OUTPATIENT)
Dept: INTERNAL MEDICINE | Facility: CLINIC | Age: 69
End: 2019-12-24

## 2019-12-24 NOTE — TELEPHONE ENCOUNTER
Aida from [facility] calling [phone]    Requesting refill of Oxydodone.  Has only a few pills remaining.  Do not see this on pt's current med list.  Asked who filled in the past and she was not sure.  Says she takes 5mg every 6 hours as needed.

## 2019-12-24 NOTE — TELEPHONE ENCOUNTER
Pain medication has not been refilled by me but I believe through the patient's pain clinic physician.

## 2019-12-26 ENCOUNTER — DOCUMENTATION ONLY (OUTPATIENT)
Dept: SLEEP MEDICINE | Facility: CLINIC | Age: 69
End: 2019-12-26

## 2019-12-26 DIAGNOSIS — G47.33 OSA (OBSTRUCTIVE SLEEP APNEA): ICD-10-CM

## 2019-12-26 DIAGNOSIS — G47.39 COMPLEX SLEEP APNEA SYNDROME: ICD-10-CM

## 2019-12-26 NOTE — PROGRESS NOTES
14 DAY STM VISIT    Diagnostic AHI: 14   PSG      Subjective measures:   Pt having issues with allergies and congestion and her usage is down because of this.  She is napping with the device during the day due to coughing so much at night. She will increase humidity for congestion. Nasal saline sprays have not been working to clear congestion. She feels the mask is comfortable and only feels it leaking when she displaces it for coughing.     Assessment: Pt not meeting objective benchmarks for AHI, leak and compliance  Patient failing following subjective benchmarks: congestion    Action plan: pt to have 30 day STM visit.    Device type: Auto-CPAP    PAP settings: CPAP min 12 cm  H20       CPAP max 15 cm  H20             90th % [id number] cm  H20    Mask type:  Nasal Mask    Objective measures: 14 day rolling measures         Compliance  21 %     % of night spent in large leak  11 % last  upload      AHI 9.41   last  Upload-primarily obstructive events.       Average number of minutes 133          Objective measure goal  Compliance   Goal >70%  Leak   Goal < 10%  AHI  Goal < 5  Usage  Goal >240      Total time spent on accessing, reviewing and interpreting remote patient PAP therapy data:   15 minutes      Total time spent with direct patient communication :   5 minutes

## 2019-12-30 ENCOUNTER — TELEPHONE (OUTPATIENT)
Dept: UROLOGY | Facility: CLINIC | Age: 69
End: 2019-12-30

## 2019-12-30 NOTE — TELEPHONE ENCOUNTER
[facility] Call Center    Phone Message    May a detailed message be left on voicemail: yes    Reason for Call: Other: Pt calling in she got some paper work for her surgery and would like help filling the forms please call back to discuss      Action Taken: Message routed to:  [facility] uro

## 2019-12-31 NOTE — TELEPHONE ENCOUNTER
Pt returned my call. The paperwork she was referring to is for per pre op appointment. Pt will be due to have a pre op in May.    Pt expressed understanding.

## 2020-01-07 ENCOUNTER — TELEPHONE (OUTPATIENT)
Dept: INTERNAL MEDICINE | Facility: CLINIC | Age: 70
End: 2020-01-07

## 2020-01-07 DIAGNOSIS — J06.9 UPPER RESPIRATORY INFECTION: Primary | ICD-10-CM

## 2020-01-07 DIAGNOSIS — R05.9 COUGH: ICD-10-CM

## 2020-01-07 NOTE — TELEPHONE ENCOUNTER
[facility]pt . Need order for code status. Last listed full code in June 2019 . Please advise if this is different .Kira Hare RN

## 2020-01-09 ENCOUNTER — TELEPHONE (OUTPATIENT)
Dept: INTERNAL MEDICINE | Facility: CLINIC | Age: 70
End: 2020-01-09

## 2020-01-09 ENCOUNTER — OFFICE VISIT (OUTPATIENT)
Dept: INTERNAL MEDICINE | Facility: CLINIC | Age: 70
End: 2020-01-09
Payer: COMMERCIAL

## 2020-01-09 VITALS
TEMPERATURE: 98.1 F | OXYGEN SATURATION: 97 % | WEIGHT: 124 LBS | SYSTOLIC BLOOD PRESSURE: 134 MMHG | HEART RATE: 68 BPM | RESPIRATION RATE: 15 BRPM | HEIGHT: 55 IN | DIASTOLIC BLOOD PRESSURE: 74 MMHG | BODY MASS INDEX: 28.7 KG/M2

## 2020-01-09 DIAGNOSIS — J06.9 UPPER RESPIRATORY TRACT INFECTION, UNSPECIFIED TYPE: ICD-10-CM

## 2020-01-09 DIAGNOSIS — B37.0 THRUSH: ICD-10-CM

## 2020-01-09 DIAGNOSIS — G89.4 CHRONIC PAIN SYNDROME: Primary | ICD-10-CM

## 2020-01-09 PROCEDURE — 99214 OFFICE O/P EST MOD 30 MIN: CPT | Performed by: INTERNAL MEDICINE

## 2020-01-09 RX ORDER — DEXTROMETHORPHAN POLISTIREX 30 MG/5ML
60 SUSPENSION ORAL 2 TIMES DAILY
Qty: 89 ML | Refills: 0 | Status: SHIPPED | OUTPATIENT
Start: 2020-01-09 | End: 2020-01-21

## 2020-01-09 RX ORDER — AZITHROMYCIN 250 MG/1
TABLET, FILM COATED ORAL
Qty: 6 TABLET | Refills: 0 | Status: SHIPPED | OUTPATIENT
Start: 2020-01-09 | End: 2020-01-21

## 2020-01-09 RX ORDER — NYSTATIN 100000/ML
500000 SUSPENSION, ORAL (FINAL DOSE FORM) ORAL 4 TIMES DAILY
Qty: 60 ML | Refills: 1 | Status: SHIPPED | OUTPATIENT
Start: 2020-01-09 | End: 2020-02-15

## 2020-01-09 ASSESSMENT — MIFFLIN-ST. JEOR: SCORE: 734.09

## 2020-01-09 NOTE — TELEPHONE ENCOUNTER
Capsaicin-menthol-methyl sal cream not available.      Would it be ok to use Capsaicin -menthol Gel 0.025%-10%?     Thanks,  Melonie Hernandez, [facility]   [phone]

## 2020-01-09 NOTE — PROGRESS NOTES
Subjective     Sonya Foote is a 69 year old female who presents to clinic today for the following health issues:    HPI     RESPIRATORY SYMPTOMS      Duration: 1+ week     Description  nasal congestion, rhinorrhea, sore throat, facial pain/pressure, cough, wheezing, fever, chills, ear pain bilateral, headache, fatigue/malaise, hoarse voice, nausea/ diarrhea     Severity: moderate    Accompanying signs and symptoms: rib pain. Concerns about oral thrush- lips feel sore     History (predisposing factors):  Asthma, copd, tobacco abuse     Precipitating or alleviating factors: None    Therapies tried and outcome:  OTC cough syrup     Other concerns:  1. Patient requesting an order for capzasin for chronic pain as states she has use this when she was in [place] but has not gotten a refill since       Patient Active Problem List   Diagnosis     Seizure disorder (H)     Osteoporosis     Schizoaffective disorder (H)     AS (sickle cell trait) (H)     Vertigo     Person who has had sex change operation     Intestinal malabsorption     Chronic neck pain     Chronic pain syndrome     Hx of colonic polyp     Iron deficiency anemia     Degeneration of intervertebral disc of lumbosacral region     Thoracic or lumbosacral neuritis or radiculitis     Androgen insensitivity syndrome     PAD (peripheral artery disease) (H)     Stenosis of carotid artery     Osteoarthritis     Essential hypertension     Type 2 diabetes mellitus with diabetic peripheral angiopathy without gangrene, without long-term current use of insulin (H)     Anxiety disorder     Lumbosacral radiculitis     Peripheral neuropathy     Primary open angle glaucoma of both eyes, severe stage     Pseudophakia of right eye     Cataract, left eye     Diabetes mellitus type 2 without retinopathy (H)     Simple chronic bronchitis (H)     JOSE (obstructive sleep apnea)-      Complex sleep apnea syndrome     Coronary artery disease involving native coronary artery of native heart      Bee sting reaction, undetermined intent, subsequent encounter     Functional incontinence     History of recurrent UTIs     Dysphagia     Incontinence     Migraine headache     History of CVA (cerebrovascular accident)     Tobacco abuse     S/P AKA (above knee amputation) bilateral (H)     Chronic, continuous use of opioids     Other neuromuscular dysfunction of bladder     Hyperlipidemia LDL goal <100     Blind left eye     Chronic back pain     Legally blind in right eye, as defined in [place]     Polypharmacy     Esophageal reflux     Suprapubic catheter (H)     Neurogenic bladder     Past Surgical History:   Procedure Laterality Date     AMPUTATE LEG ABOVE KNEE Left 6/11/2016    Procedure: AMPUTATE LEG ABOVE KNEE;  Surgeon: Mello Rodriguez MD;  [facility]     AMPUTATE LEG BELOW KNEE Right 11/7/2016    Procedure: AMPUTATE LEG BELOW KNEE;  Surgeon: Savannah Durant MD;  [facility]     AMPUTATE REVISION STUMP LOWER EXTREMITY Right 11/11/2016    Procedure: AMPUTATE REVISION STUMP LOWER EXTREMITY;  Surgeon: Savannah Durant MD;  [facility]     AMPUTATE REVISION STUMP LOWER EXTREMITY Right 11/16/2016    Procedure: AMPUTATE REVISION STUMP LOWER EXTREMITY;  Surgeon: Savannah Durant MD;  [facility]     AMPUTATE TOE(S) Right 1/5/2016    Procedure: AMPUTATE TOE(S);  Surgeon: Mello Gaines DPM;  Location:  [facility]     ANGIOGRAM Bilateral 11/21/2014    Procedure: ANGIOGRAM;  Surgeon: Savannah Durant MD;  [facility]     ANGIOGRAM Left 1/16/2015    Procedure: ANGIOGRAM;  Surgeon: Savannah Durant MD;  [facility]     ANGIOGRAM Bilateral 9/14/2015    Procedure: ANGIOGRAM;  Surgeon: Savannah Durant MD;  [facility]     ANGIOGRAM Left 10/12/2015    Procedure: ANGIOGRAM;  Surgeon: Savannah Durant MD;  [facility]     ANGIOGRAM Right 6/6/2016    Procedure: ANGIOGRAM;  Surgeon: Savannah Durant MD;  [facility]     ANGIOPLASTY Right 6/6/2016    Procedure: ANGIOPLASTY;  Surgeon: Savannah Durant MD;   [facility]     APPENDECTOMY       BREAST SURGERY      right breast bx (benign)     CATARACT IOL, RT/LT Right      CHOLECYSTECTOMY       COLONOSCOPY N/A 8/25/2014    Procedure: COLONOSCOPY;  Surgeon: Mello Ferrer MD;  [facility]     COLONOSCOPY WITH CO2 INSUFFLATION N/A 8/20/2014    Procedure: COLONOSCOPY WITH CO2 INSUFFLATION;  Surgeon: Duane, William Charles, MD;  [facility]     CYSTOSCOPY, INTRAVESICAL INJECTION N/A 10/31/2019    Procedure: CYSTOSCOPY, BOTOX INJECTION, Suprapubic Catheter Exchange;  Surgeon: Loki Gordon MD;  [facility]     CYSTOSTOMY, INSERT TUBE SUPRAPUBIC, COMBINED N/A 1/16/2018    Procedure: COMBINED CYSTOSTOMY, INSERT TUBE SUPRAPUBIC;  Cystoscopy, Intraoperative Ultrasound, Suprapubic Tube Placement;  Surgeon: Keanu Dawson MD;  [facility]     ENDARTERECTOMY FEMORAL  5/23/2014    Procedure: ENDARTERECTOMY FEMORAL;  Surgeon: Jason Joshi MD;  [facility]     ESOPHAGOSCOPY, GASTROSCOPY, DUODENOSCOPY (EGD), COMBINED  12/14/2012    Procedure: COMBINED ESOPHAGOSCOPY, GASTROSCOPY, DUODENOSCOPY (EGD), BIOPSY SINGLE OR MULTIPLE;  ESOPHAGOSCOPY, GASTROSCOPY, DUODENOSCOPY (EGD), DILATATION ;  Surgeon: Elizabeth Stevenson MD;  Location:  [facility]     ESOPHAGOSCOPY, GASTROSCOPY, DUODENOSCOPY (EGD), COMBINED  12/31/2013    Procedure: COMBINED ESOPHAGOSCOPY, GASTROSCOPY, DUODENOSCOPY (EGD), BIOPSY SINGLE OR MULTIPLE;;  Surgeon: Clemente Lopez MD;  [facility]     ESOPHAGOSCOPY, GASTROSCOPY, DUODENOSCOPY (EGD), COMBINED  4/1/2014    Procedure: COMBINED ESOPHAGOSCOPY, GASTROSCOPY, DUODENOSCOPY (EGD);;  Surgeon: Clemente Lopez MD;  [facility]     ESOPHAGOSCOPY, GASTROSCOPY, DUODENOSCOPY (EGD), COMBINED  6/28/2014    Procedure: COMBINED ESOPHAGOSCOPY, GASTROSCOPY, DUODENOSCOPY (EGD);  Surgeon: Clemente Lopez MD;  [facility]     ESOPHAGOSCOPY, GASTROSCOPY, DUODENOSCOPY (EGD), COMBINED N/A 8/20/2014    Procedure: COMBINED ESOPHAGOSCOPY,  GASTROSCOPY, DUODENOSCOPY (EGD), BIOPSY SINGLE OR MULTIPLE;  Surgeon: Duane, William Charles, MD;  [facility]     ESOPHAGOSCOPY, GASTROSCOPY, DUODENOSCOPY (EGD), COMBINED N/A 8/22/2014    Procedure: COMBINED ESOPHAGOSCOPY, GASTROSCOPY, DUODENOSCOPY (EGD), BIOPSY SINGLE OR MULTIPLE;  Surgeon: Mello Ferrer MD;  [facility]     ESOPHAGOSCOPY, GASTROSCOPY, DUODENOSCOPY (EGD), COMBINED N/A 10/2/2014    Procedure: COMBINED ESOPHAGOSCOPY, GASTROSCOPY, DUODENOSCOPY (EGD), BIOPSY SINGLE OR MULTIPLE;  Surgeon: Remy Haskins MD;  [facility]     ESOPHAGOSCOPY, GASTROSCOPY, DUODENOSCOPY (EGD), COMBINED Left 12/15/2014    Procedure: COMBINED ESOPHAGOSCOPY, GASTROSCOPY, DUODENOSCOPY (EGD), BIOPSY SINGLE OR MULTIPLE;  Surgeon: Remy Haskins MD;  [facility]     ESOPHAGOSCOPY, GASTROSCOPY, DUODENOSCOPY (EGD), COMBINED N/A 2/25/2015    Procedure: COMBINED ENDOSCOPIC ULTRASOUND, ESOPHAGOSCOPY, GASTROSCOPY, DUODENOSCOPY (EGD), FINE NEEDLE ASPIRATE/BIOPSY;  Surgeon: Clemente Lugo MD;  [facility]     ESOPHAGOSCOPY, GASTROSCOPY, DUODENOSCOPY (EGD), COMBINED Left 2/25/2015    Procedure: COMBINED ESOPHAGOSCOPY, GASTROSCOPY, DUODENOSCOPY (EGD), BIOPSY SINGLE OR MULTIPLE;  Surgeon: Clemente Lugo MD;  [facility]     ESOPHAGOSCOPY, GASTROSCOPY, DUODENOSCOPY (EGD), COMBINED N/A 9/25/2016    Procedure: COMBINED ESOPHAGOSCOPY, GASTROSCOPY, DUODENOSCOPY (EGD);  Surgeon: Aziza Patiño MD;  [facility]     ESOPHAGOSCOPY, GASTROSCOPY, DUODENOSCOPY (EGD), COMBINED N/A 1/18/2017    Procedure: COMBINED ESOPHAGOSCOPY, GASTROSCOPY, DUODENOSCOPY (EGD), BIOPSY SINGLE OR MULTIPLE;  Surgeon: Clemente Lopez MD;  [facility]     ESOPHAGOSCOPY, GASTROSCOPY, DUODENOSCOPY (EGD), COMBINED N/A 11/26/2017    Procedure: COMBINED ESOPHAGOSCOPY, GASTROSCOPY, DUODENOSCOPY (EGD), REMOVE FOREIGN BODY;  Esophagogastroduodenoscopy with foreign body extraction  ;  Surgeon: Herberth Castrejon MD;   [facility]     ESOPHAGOSCOPY, GASTROSCOPY, DUODENOSCOPY (EGD), COMBINED N/A 11/26/2017    Procedure: COMBINED ESOPHAGOSCOPY, GASTROSCOPY, DUODENOSCOPY (EGD), REMOVE FOREIGN BODY;;  Surgeon: Herberth Castrejon MD;  [facility]     FASCIOTOMY LOWER EXTREMITY Left 6/10/2016    Procedure: FASCIOTOMY LOWER EXTREMITY;  Surgeon: Mello Rodriguez MD;  [facility]     HC CAPSULE ENDOSCOPY N/A 8/25/2014    Procedure: CAPSULE/PILL CAM ENDOSCOPY;  Surgeon: Remy Haskins MD;  [facility]     HC CAPSULE ENDOSCOPY N/A 10/2/2014    Procedure: CAPSULE/PILL CAM ENDOSCOPY;  Surgeon: Remy Haskins MD;  [facility]     ORTHOPEDIC SURGERY      broken wrist repair     SEX TRANSFORMATION SURGERY, MALE TO FEMALE  1974 1974     SINUS SURGERY      cyst removed     TONSILLECTOMY       VASCULAR SURGERY  2006    Left carotid stent       Social History     Tobacco Use     Smoking status: Current Every Day Smoker     Packs/day: 0.25     Years: 30.00     Pack years: 7.50     Types: Cigarettes, Cigars     Smokeless tobacco: Never Used   Substance Use Topics     Alcohol use: No     Alcohol/week: 0.0 standard drinks     Family History   Problem Relation Age of Onset     Dementia Mother      Glaucoma Mother      Diabetes Mother         may have been type 1, childhood     Coronary Artery Disease Mother         MI     Glaucoma Father      Diabetes Father         may hev been type 1 - ??     Heart Failure Father      Cancer Maternal Aunt         leukemia     Schizophrenia Brother      Depression Brother      Suicide Sister      Depression Sister      Diabetes Sister      Cancer Maternal Aunt         ovarian     Glaucoma Maternal Grandmother      Diabetes Maternal Grandmother      Glaucoma Maternal Grandfather      Diabetes Maternal Grandfather      Glaucoma Paternal Grandmother      Diabetes Paternal Grandmother      Glaucoma Paternal Grandfather      Diabetes Paternal Grandfather      Breast Cancer Sister       Cerebrovascular Disease Brother      Colon Cancer No family hx of      Macular Degeneration No family hx of          Current Outpatient Medications   Medication Sig Dispense Refill     ACETAMINOPHEN PO Take 1,000 mg by mouth every 6 hours as needed for pain Not to exceed 4000 mg/day       ADVAIR DISKUS 250-50 MCG/DOSE diskus inhaler Inhale 1 puff into the lungs 2 times daily 60 Inhaler 11     albuterol (PROVENTIL) (2.5 MG/3ML) 0.083% neb solution INHALE 1 VIAL VIA NEBULIZER EVERY 6 HOURS AS NEEDED 360 mL 11     alendronate (FOSAMAX) 70 MG tablet Take 1 tablet (70 mg) by mouth with 8oz water every 7 days 30 minutes before breakfast and remain upright during this time. 12 tablet 3     aspirin EC 81 MG EC tablet Take 1 tablet (81 mg) by mouth daily 90 tablet 3     atorvastatin (LIPITOR) 40 MG tablet TAKE 1 TAB BY MOUTH ONCE DAILY 30 tablet 11     blood glucose monitoring (ONE TOUCH ULTRA 2) meter device kit Use to test blood sugars 3 times daily or as directed. 1 kit 0     blood glucose monitoring (ONE TOUCH ULTRA) test strip Use to test blood sugars 3 times daily or as directed. 3 Box 3     blood glucose monitoring (ONE TOUCH ULTRASOFT) lancets Use to test blood sugar 3 times daily or as directed. 100 each PRN     brimonidine (ALPHAGAN) 0.2 % ophthalmic solution Place 1 drop into the right eye 2 times daily 1 Bottle 11     cetirizine (ZYRTEC) 10 MG tablet Take 1 tablet (10 mg) by mouth every evening 30 tablet 3     Cholecalciferol (VITAMIN D) 2000 UNITS tablet Take 2,000 Units by mouth daily. 100 tablet 3     clotrimazole (LOTRIMIN) 1 % cream INSERT 1 APPLICATORFUL VAGINALLY TWICE A DAY FOR VAGINAL PAIN 12 g 0     clotrimazole (LOTRIMIN) 1 % external cream Apply topically 2 times daily 12 g 0     diclofenac (VOLTAREN) 1 % GEL topical gel Apply 2 g topically 4 times daily to hands 100 g 11     dorzolamide (TRUSOPT) 2 % ophthalmic solution Place 1 drop into the right eye 2 times daily 1 Bottle 11     EPINEPHrine  (EPIPEN/ADRENACLICK/OR ANY BX GENERIC EQUIV) 0.3 MG/0.3ML injection 2-pack Inject 0.3 mLs (0.3 mg) into the muscle as needed for anaphylaxis 0.6 mL 1     escitalopram (LEXAPRO) 10 MG tablet TAKE 1 TAB BY MOUTH ONCE DAILY 30 tablet 11     ferrous sulfate (IRON) 325 (65 FE) MG tablet Take 1 tablet (325 mg) by mouth 2 times daily With meals 60 tablet 2     fluticasone (FLONASE) 50 MCG/ACT nasal spray Spray 1 spray into both nostrils daily       lactulose (CHRONULAC) 10 GM/15ML solution Take 20 g by mouth as needed for constipation       latanoprost (XALATAN) 0.005 % ophthalmic solution Place 1 drop into both eyes At Bedtime 2.5 mL 11     levETIRAcetam (KEPPRA) 500 MG tablet TAKE 1 TAB BY MOUTH TWICE A DAY 60 tablet 5     lidocaine (LIDODERM) 5 % patch APPLY 1 PATCHES TO PAINFUL AREA AT ONCE FOR UP TO 12 HOURS WITHIN A 24 HOUR PERIOD REMOVE AFTER 12 HOURS 30 patch 1     lisinopril (PRINIVIL/ZESTRIL) 20 MG tablet TAKE 1 TAB BY MOUTH ONCE DAILY 30 tablet 11     metFORMIN (GLUCOPHAGE) 500 MG tablet TAKE 1 TAB BY MOUTH IN THE EVENING WITH DINNER 30 tablet 5     metoprolol succinate ER (TOPROL-XL) 50 MG 24 hr tablet TAKE 1 TAB BY MOUTH ONCE DAILY 30 tablet 11     nicotine (NICODERM CQ) 14 MG/24HR 24 hr patch Place 1 patch onto the skin every 24 hours 30 patch 3     nitroglycerin (NITROSTAT) 0.4 MG SL tablet Place 1 tablet (0.4 mg) under the tongue every 5 minutes as needed for chest pain if you are still having symptoms after 3 doses (15 minutes) call 185. 68 tablet 1     ondansetron (ZOFRAN-ODT) 8 MG ODT tab Take 1 tablet (8 mg) by mouth every 8 hours as needed for nausea 30 tablet 0     order for DME autoPAP 12-15 cmH20 1 Device 0     order for DME Equipment being ordered: air pressure mattress 1 Device 0     order for DME Equipment being ordered: Prosthetic legs 2 each 0     order for DME Equipment being ordered: Prosthetic 1 each 0     order for DME Equipment being ordered: lift recliner 1 Device 0     order for DME  Equipment being ordered: Hospital Bed with side rails 1 each 0     order for DME Equipment being ordered: Power Wheel Chair 1 Device 0     order for DME Equipment being ordered: bandage tape, prefer paper 1 Package 0     order for DME Full electric hospital bed with half rails    Dx: J92791, I110, J449  Length of need: lifetime 1 Device 0     order for DME Wheel Chair Cushion: 18 x 18 inch Roho cushion 1 Device 0     order for DME Hospital bed with side rails 1 Device 0     order for DME Equipment being ordered: CPAP supplies mask, hose, filters, cushion    fax to [facility] at [phone] 10 Device prn     order for DME Equipment being ordered: CPAP supplies mask, hose, filters, cushion fax to [facility] at [phone]  Disp: 10 Device Refills: prn   Class: Local Print Start: 2/10/2017 1 Device 0     order for DME Equipment being ordered: Nebulizer and tubing supplies 1 Units 0     order for DME Equipment being ordered: Glucerna daily shakes with each meal 1 Box 11     ORDER FOR DME Equipment being ordered: Power Wheelchair 1 Device 0     ORDER FOR DME Equipment being ordered: Depends briefs 1 Month 11     oxyCODONE (ROXICODONE) 5 MG tablet TAKE 1 TABLET BY MOUTH EVERY 6 HOURS AS NEEDED  0     pantoprazole (PROTONIX) 40 MG EC tablet TAKE 1 TAB BY MOUTH ONCE DAILY 30 tablet 11     phenytoin (DILANTIN) 100 MG capsule TAKE 2 CAPS (200MG) BY MOUTH TWICE A  capsule 11     polyethylene glycol (MIRALAX/GLYCOLAX) Packet Take 17 g by mouth daily Dissolved in water or juice        pregabalin (LYRICA) 75 MG capsule Take 150 mg by mouth 3 times daily       risperiDONE (RISPERDAL) 0.5 MG tablet TAKE 1 TAB BY MOUTH AT BEDTIME 30 tablet 5     sennosides (SENOKOT) 8.6 MG tablet Take 1 tablet by mouth 2 times daily        solifenacin (VESICARE) 10 MG tablet TAKE 1 TAB BY MOUTH ONCE DAILY 30 tablet 11     sucralfate (CARAFATE) 1 GM tablet TAKE 1 TAB BY MOUTH FOUR TIMES DAILY. MAY DISSOLVE IN 10ML WATER ISNECESSARY  "120 tablet 11     traZODone (DESYREL) 150 MG tablet TAKE 1 TAB BY MOUTH AT BEDTIME 30 tablet 11     umeclidinium (INCRUSE ELLIPTA) 62.5 MCG/INH inhaler Inhale 1 puff into the lungs daily 1 Inhaler 6     VENTOLIN  (90 Base) MCG/ACT inhaler Inhale 2 puffs into the lungs every 6 hours as needed for shortness of breath / dyspnea or wheezing 8.5 g 11     Allergies   Allergen Reactions     Bee Venom      Penicillins Anaphylaxis     Other reaction(s): Skin Rash and/or Hives. Tolerated cefepime in 12/2016     Dilantin [Phenytoin] Other (See Comments)     Generic dilantin only per pt     Iodide Hives     09/11/15: Pt states she can use iodine and doesn't have any problems      Iodine-131      Novocaine [Procaine] Hives     Other reaction(s): Skin Rash and/or Hives     Tositumomab      BP Readings from Last 3 Encounters:   12/02/19 (!) 155/79   11/19/19 98/60   11/04/19 92/52    Wt Readings from Last 3 Encounters:   11/19/19 58.5 kg (129 lb)   11/04/19 59 kg (130 lb)   10/28/19 59 kg (130 lb)         Reviewed and updated as needed this visit by Provider         Review of Systems   ROS COMP: CONSTITUTIONAL: NEGATIVE for fever, chills, change in weight  EYES: NEGATIVE for vision changes or irritation  RESP: NEGATIVE for significant cough or SOB  CV: NEGATIVE for chest pain, palpitations or peripheral edema  GI: NEGATIVE for nausea, abdominal pain, heartburn, or change in bowel habits  : NEGATIVE for frequency, dysuria, or hematuria  MUSCULOSKELETAL: NEGATIVE for significant arthralgias or myalgia  NEURO: NEGATIVE for weakness, dizziness or paresthesias  HEME: NEGATIVE for bleeding problems  PSYCHIATRIC: NEGATIVE for changes in mood or affect      Objective    /74   Pulse 68   Temp 98.1  F (36.7  C) (Oral)   Resp 15   Ht 1.092 m (3' 7\")   Wt 56.2 kg (124 lb)   SpO2 97%   BMI 47.15 kg/m    There is no height or weight on file to calculate BMI.  Physical Exam   GENERAL: alert and no distress  EYES: Eyes " grossly normal to inspection, PERRL and conjunctivae and sclerae normal  HENT: ear canals and TM's normal, nose and mouth with noted erythema tongue and oral mucosa.  NECK: no adenopathy, no asymmetry, masses, or scars and thyroid normal to palpation  RESP: lungs clear to auscultation - no rales, rhonchi or wheezes with poor effort  CV: regular rate and rhythm, normal S1 S2, no S3 or S4, no  click or rub  MS: Bilateral lower extremity amputations are noted.  The patient presents in a motorized wheelchair per baseline.  PSYCH: mentation appears normal, affect normal/bright       Assessment & Plan     1. Chronic pain syndrome  Start for topical prn therapy  - capsaicin-menthol-methyl sal 0.025-1-12 % external cream; Apply 1 Application topically daily as needed (topical pain)  Dispense: 56.6 g; Refill: 1    2. Thrush  Script as ordered for mild oral thrush  - nystatin (MYCOSTATIN) 453567 UNIT/ML suspension; Take 5 mLs (500,000 Units) by mouth 4 times daily - swish and spit for 7 days  Dispense: 60 mL; Refill: 1    3. Upper respiratory tract infection, unspecified type  Empiric therapy discussed based on age and risk factors  - azithromycin (ZITHROMAX) 250 MG tablet; Take 2 tablets (500 mg) by mouth daily for 1 day, THEN 1 tablet (250 mg) daily for 4 days.  Dispense: 6 tablet; Refill: 0  - dextromethorphan (DELSYM) 30 MG/5ML liquid; Take 10 mLs (60 mg) by mouth 2 times daily  Dispense: 89 mL; Refill: 0     See Patient Instructions    Return for if symptoms recur or worsen.    Allan Casey MD  [facility]

## 2020-01-10 ENCOUNTER — DOCUMENTATION ONLY (OUTPATIENT)
Dept: SLEEP MEDICINE | Facility: CLINIC | Age: 70
End: 2020-01-10

## 2020-01-10 DIAGNOSIS — G47.39 COMPLEX SLEEP APNEA SYNDROME: ICD-10-CM

## 2020-01-10 DIAGNOSIS — G47.33 OSA (OBSTRUCTIVE SLEEP APNEA): ICD-10-CM

## 2020-01-10 NOTE — PROGRESS NOTES
30 DAY STM VISIT    Diagnostic AHI: 14  PSG      Subjective measures:   Pt states that she is sick right now and is struggling to use it.  She wasn't having issues tolerating it prior to getting sick.  She is going to try to use it as much as possible.  She does know that she needs to meet compliance. She will continue to try to wear it.     Assessment: Pt not meeting objective benchmarks for compliance  Patient meeting subjective benchmarks.   Action plan: 2 week STM recheck appt scheduled  Patient has scheduled a follow up visit with Dr. Wright on 3/4/20  Device type: Auto-CPAP  PAP settings: CPAP min 12 cm  H20     CPAP max 15 cm  H20     90th % pressure 13.5 cm  H20    Mask type:  Nasal Mask    Objective measures: 14 day rolling measures         Compliance  14 %     % of night spent in large leak  11 % last  upload      AHI 9.64   last  upload      Average number of minutes 126          Objective measure goal  Compliance   Goal >70%  Leak   Goal < 10%  AHI  Goal < 5  Usage  Goal >240      Total time spent on accessing, reviewing and interpreting remote patient PAP therapy data:   12 minutes      Total time spent with direct patient communication :   5 minutes

## 2020-01-13 RX ORDER — DEXTROMETHORPHAN POLISTIREX 30 MG/5ML
60 SUSPENSION ORAL 2 TIMES DAILY
Qty: 148 ML | Refills: 0 | Status: SHIPPED | OUTPATIENT
Start: 2020-01-13 | End: 2020-02-15

## 2020-01-13 NOTE — TELEPHONE ENCOUNTER
Dr. Casey,     Pt was seen on 1/9 for URI, and she finished the azithromycin, and is almost done with the dextromethorphan (DELSYM) 30 MG/5ML liquid.  Says she is still feeling sick: no energy, tired, coughing up clear mucus.   She is wondering if she can get a refill of the Delsym? And with directions for TID instead of BID?  She says she continues to be coughing, a lot throughout the day. And wants to keep taking the cough medicine to help with symptoms.    RX pending, please advise.

## 2020-01-15 DIAGNOSIS — J06.9 UPPER RESPIRATORY TRACT INFECTION, UNSPECIFIED TYPE: ICD-10-CM

## 2020-01-15 RX ORDER — AZITHROMYCIN 250 MG/1
TABLET, FILM COATED ORAL
Qty: 6 TABLET | Refills: 0 | OUTPATIENT
Start: 2020-01-15 | End: 2020-01-20

## 2020-01-21 ENCOUNTER — OFFICE VISIT (OUTPATIENT)
Dept: INTERNAL MEDICINE | Facility: CLINIC | Age: 70
End: 2020-01-21
Payer: COMMERCIAL

## 2020-01-21 VITALS
OXYGEN SATURATION: 99 % | RESPIRATION RATE: 15 BRPM | DIASTOLIC BLOOD PRESSURE: 80 MMHG | SYSTOLIC BLOOD PRESSURE: 132 MMHG | BODY MASS INDEX: 47.15 KG/M2 | TEMPERATURE: 98 F | HEART RATE: 78 BPM | WEIGHT: 124 LBS

## 2020-01-21 DIAGNOSIS — J06.9 UPPER RESPIRATORY TRACT INFECTION, UNSPECIFIED TYPE: Primary | ICD-10-CM

## 2020-01-21 PROCEDURE — 99214 OFFICE O/P EST MOD 30 MIN: CPT | Performed by: INTERNAL MEDICINE

## 2020-01-21 RX ORDER — AZITHROMYCIN 250 MG/1
TABLET, FILM COATED ORAL
Qty: 6 TABLET | Refills: 0 | Status: SHIPPED | OUTPATIENT
Start: 2020-01-21 | End: 2020-02-15

## 2020-01-21 NOTE — PROGRESS NOTES
Subjective     Sonya Foote is a 69 year old female who presents to clinic today for the following health issues:    HPI     Patient showed up late for examination but was still seen.    Patient was seen prior on 1/9/2020 with noted symptoms as listed below:    RESPIRATORY SYMPTOMS:    Patient states that she has been trying to smoke cigarettes and when she smokes cigarettes she is been coughing and spitting up greenish material.       Duration: 1+ week     Description  nasal congestion, rhinorrhea, sore throat, facial pain/pressure, cough, wheezing, fever, chills, ear pain bilateral, headache, fatigue/malaise, hoarse voice, nausea/ diarrhea     Severity: moderate    Accompanying signs and symptoms: rib pain. Concerns about oral thrush- lips feel sore     History (predisposing factors):  Asthma, copd, tobacco abuse     Precipitating or alleviating factors: None    Therapies tried and outcome:  OTC cough syrup       Patient Active Problem List   Diagnosis     Seizure disorder (H)     Osteoporosis     Schizoaffective disorder (H)     AS (sickle cell trait) (H)     Vertigo     Person who has had sex change operation     Intestinal malabsorption     Chronic neck pain     Chronic pain syndrome     Hx of colonic polyp     Iron deficiency anemia     Degeneration of intervertebral disc of lumbosacral region     Thoracic or lumbosacral neuritis or radiculitis     Androgen insensitivity syndrome     PAD (peripheral artery disease) (H)     Stenosis of carotid artery     Osteoarthritis     Essential hypertension     Type 2 diabetes mellitus with diabetic peripheral angiopathy without gangrene, without long-term current use of insulin (H)     Anxiety disorder     Lumbosacral radiculitis     Peripheral neuropathy     Primary open angle glaucoma of both eyes, severe stage     Pseudophakia of right eye     Cataract, left eye     Diabetes mellitus type 2 without retinopathy (H)     Simple chronic bronchitis (H)     JOSE (obstructive  sleep apnea)-      Complex sleep apnea syndrome     Coronary artery disease involving native coronary artery of native heart     Bee sting reaction, undetermined intent, subsequent encounter     Functional incontinence     History of recurrent UTIs     Dysphagia     Incontinence     Migraine headache     History of CVA (cerebrovascular accident)     Tobacco abuse     S/P AKA (above knee amputation) bilateral (H)     Chronic, continuous use of opioids     Other neuromuscular dysfunction of bladder     Hyperlipidemia LDL goal <100     Blind left eye     Chronic back pain     Legally blind in right eye, as defined in [place]     Polypharmacy     Esophageal reflux     Suprapubic catheter (H)     Neurogenic bladder     Past Surgical History:   Procedure Laterality Date     AMPUTATE LEG ABOVE KNEE Left 6/11/2016    Procedure: AMPUTATE LEG ABOVE KNEE;  Surgeon: Mello Rodriguez MD;  [facility]     AMPUTATE LEG BELOW KNEE Right 11/7/2016    Procedure: AMPUTATE LEG BELOW KNEE;  Surgeon: Savannah Durant MD;  [facility]     AMPUTATE REVISION STUMP LOWER EXTREMITY Right 11/11/2016    Procedure: AMPUTATE REVISION STUMP LOWER EXTREMITY;  Surgeon: Savannah Durant MD;  [facility]     AMPUTATE REVISION STUMP LOWER EXTREMITY Right 11/16/2016    Procedure: AMPUTATE REVISION STUMP LOWER EXTREMITY;  Surgeon: Savannah Durant MD;  [facility]     AMPUTATE TOE(S) Right 1/5/2016    Procedure: AMPUTATE TOE(S);  Surgeon: Mello Gaines DPM;  Location: [facility]     ANGIOGRAM Bilateral 11/21/2014    Procedure: ANGIOGRAM;  Surgeon: Savannah Durant MD;  [facility]     ANGIOGRAM Left 1/16/2015    Procedure: ANGIOGRAM;  Surgeon: Savannah Durant MD;  [facility]     ANGIOGRAM Bilateral 9/14/2015    Procedure: ANGIOGRAM;  Surgeon: Savannah Durant MD;  [facility]     ANGIOGRAM Left 10/12/2015    Procedure: ANGIOGRAM;  Surgeon: Savannah Durant MD;  [facility]     ANGIOGRAM Right 6/6/2016    Procedure: ANGIOGRAM;  Surgeon:  Savannah Durant MD;  [facility]     ANGIOPLASTY Right 6/6/2016    Procedure: ANGIOPLASTY;  Surgeon: Savannah Durant MD;  [facility]     APPENDECTOMY       BREAST SURGERY      right breast bx (benign)     CATARACT IOL, RT/LT Right      CHOLECYSTECTOMY       COLONOSCOPY N/A 8/25/2014    Procedure: COLONOSCOPY;  Surgeon: Mello Ferrer MD;  [facility]     COLONOSCOPY WITH CO2 INSUFFLATION N/A 8/20/2014    Procedure: COLONOSCOPY WITH CO2 INSUFFLATION;  Surgeon: Duane, William Charles, MD;  [facility]     CYSTOSCOPY, INTRAVESICAL INJECTION N/A 10/31/2019    Procedure: CYSTOSCOPY, BOTOX INJECTION, Suprapubic Catheter Exchange;  Surgeon: Loki Gordon MD;  [facility]     CYSTOSTOMY, INSERT TUBE SUPRAPUBIC, COMBINED N/A 1/16/2018    Procedure: COMBINED CYSTOSTOMY, INSERT TUBE SUPRAPUBIC;  Cystoscopy, Intraoperative Ultrasound, Suprapubic Tube Placement;  Surgeon: Keanu Dawson MD;  [facility]     ENDARTERECTOMY FEMORAL  5/23/2014    Procedure: ENDARTERECTOMY FEMORAL;  Surgeon: Jason Joshi MD;  [facility]     ESOPHAGOSCOPY, GASTROSCOPY, DUODENOSCOPY (EGD), COMBINED  12/14/2012    Procedure: COMBINED ESOPHAGOSCOPY, GASTROSCOPY, DUODENOSCOPY (EGD), BIOPSY SINGLE OR MULTIPLE;  ESOPHAGOSCOPY, GASTROSCOPY, DUODENOSCOPY (EGD), DILATATION ;  Surgeon: Elizabeth Stevenson MD;  Location:  [facility]     ESOPHAGOSCOPY, GASTROSCOPY, DUODENOSCOPY (EGD), COMBINED  12/31/2013    Procedure: COMBINED ESOPHAGOSCOPY, GASTROSCOPY, DUODENOSCOPY (EGD), BIOPSY SINGLE OR MULTIPLE;;  Surgeon: Clemente Lopez MD;  [facility]     ESOPHAGOSCOPY, GASTROSCOPY, DUODENOSCOPY (EGD), COMBINED  4/1/2014    Procedure: COMBINED ESOPHAGOSCOPY, GASTROSCOPY, DUODENOSCOPY (EGD);;  Surgeon: Clemente Lopez MD;  [facility]     ESOPHAGOSCOPY, GASTROSCOPY, DUODENOSCOPY (EGD), COMBINED  6/28/2014    Procedure: COMBINED ESOPHAGOSCOPY, GASTROSCOPY, DUODENOSCOPY (EGD);  Surgeon: Clemente Lopez MD;   [facility]     ESOPHAGOSCOPY, GASTROSCOPY, DUODENOSCOPY (EGD), COMBINED N/A 8/20/2014    Procedure: COMBINED ESOPHAGOSCOPY, GASTROSCOPY, DUODENOSCOPY (EGD), BIOPSY SINGLE OR MULTIPLE;  Surgeon: Duane, William Charles, MD;  [facility]     ESOPHAGOSCOPY, GASTROSCOPY, DUODENOSCOPY (EGD), COMBINED N/A 8/22/2014    Procedure: COMBINED ESOPHAGOSCOPY, GASTROSCOPY, DUODENOSCOPY (EGD), BIOPSY SINGLE OR MULTIPLE;  Surgeon: Mello Ferrer MD;  [facility]     ESOPHAGOSCOPY, GASTROSCOPY, DUODENOSCOPY (EGD), COMBINED N/A 10/2/2014    Procedure: COMBINED ESOPHAGOSCOPY, GASTROSCOPY, DUODENOSCOPY (EGD), BIOPSY SINGLE OR MULTIPLE;  Surgeon: Remy Haskins MD;  [facility]     ESOPHAGOSCOPY, GASTROSCOPY, DUODENOSCOPY (EGD), COMBINED Left 12/15/2014    Procedure: COMBINED ESOPHAGOSCOPY, GASTROSCOPY, DUODENOSCOPY (EGD), BIOPSY SINGLE OR MULTIPLE;  Surgeon: Remy Haskins MD;  [facility]     ESOPHAGOSCOPY, GASTROSCOPY, DUODENOSCOPY (EGD), COMBINED N/A 2/25/2015    Procedure: COMBINED ENDOSCOPIC ULTRASOUND, ESOPHAGOSCOPY, GASTROSCOPY, DUODENOSCOPY (EGD), FINE NEEDLE ASPIRATE/BIOPSY;  Surgeon: Clemente Lugo MD;  [facility]     ESOPHAGOSCOPY, GASTROSCOPY, DUODENOSCOPY (EGD), COMBINED Left 2/25/2015    Procedure: COMBINED ESOPHAGOSCOPY, GASTROSCOPY, DUODENOSCOPY (EGD), BIOPSY SINGLE OR MULTIPLE;  Surgeon: Clemente Lugo MD;  [facility]     ESOPHAGOSCOPY, GASTROSCOPY, DUODENOSCOPY (EGD), COMBINED N/A 9/25/2016    Procedure: COMBINED ESOPHAGOSCOPY, GASTROSCOPY, DUODENOSCOPY (EGD);  Surgeon: Aziza Patiño MD;  [facility]     ESOPHAGOSCOPY, GASTROSCOPY, DUODENOSCOPY (EGD), COMBINED N/A 1/18/2017    Procedure: COMBINED ESOPHAGOSCOPY, GASTROSCOPY, DUODENOSCOPY (EGD), BIOPSY SINGLE OR MULTIPLE;  Surgeon: Clemente Lopez MD;  Location:  [facility]     ESOPHAGOSCOPY, GASTROSCOPY, DUODENOSCOPY (EGD), COMBINED N/A 11/26/2017    Procedure: COMBINED ESOPHAGOSCOPY, GASTROSCOPY, DUODENOSCOPY  (EGD), REMOVE FOREIGN BODY;  Esophagogastroduodenoscopy with foreign body extraction  ;  Surgeon: Herberth Castrejon MD;  [facility]     ESOPHAGOSCOPY, GASTROSCOPY, DUODENOSCOPY (EGD), COMBINED N/A 11/26/2017    Procedure: COMBINED ESOPHAGOSCOPY, GASTROSCOPY, DUODENOSCOPY (EGD), REMOVE FOREIGN BODY;;  Surgeon: Herberth Castrejon MD;  [facility]     FASCIOTOMY LOWER EXTREMITY Left 6/10/2016    Procedure: FASCIOTOMY LOWER EXTREMITY;  Surgeon: Mello Rodriguez MD;  [facility]     HC CAPSULE ENDOSCOPY N/A 8/25/2014    Procedure: CAPSULE/PILL CAM ENDOSCOPY;  Surgeon: Remy Haskins MD;  [facility]     HC CAPSULE ENDOSCOPY N/A 10/2/2014    Procedure: CAPSULE/PILL CAM ENDOSCOPY;  Surgeon: Remy Haskins MD;  [facility]     ORTHOPEDIC SURGERY      broken wrist repair     SEX TRANSFORMATION SURGERY, MALE TO FEMALE  1974 1974     SINUS SURGERY      cyst removed     TONSILLECTOMY       VASCULAR SURGERY  2006    Left carotid stent       Social History     Tobacco Use     Smoking status: Current Every Day Smoker     Packs/day: 0.25     Years: 30.00     Pack years: 7.50     Types: Cigarettes, Cigars     Smokeless tobacco: Never Used   Substance Use Topics     Alcohol use: No     Alcohol/week: 0.0 standard drinks     Family History   Problem Relation Age of Onset     Dementia Mother      Glaucoma Mother      Diabetes Mother         may have been type 1, childhood     Coronary Artery Disease Mother         MI     Glaucoma Father      Diabetes Father         may hev been type 1 - ??     Heart Failure Father      Cancer Maternal Aunt         leukemia     Schizophrenia Brother      Depression Brother      Suicide Sister      Depression Sister      Diabetes Sister      Cancer Maternal Aunt         ovarian     Glaucoma Maternal Grandmother      Diabetes Maternal Grandmother      Glaucoma Maternal Grandfather      Diabetes Maternal Grandfather      Glaucoma Paternal Grandmother       Diabetes Paternal Grandmother      Glaucoma Paternal Grandfather      Diabetes Paternal Grandfather      Breast Cancer Sister      Cerebrovascular Disease Brother      Colon Cancer No family hx of      Macular Degeneration No family hx of          Current Outpatient Medications   Medication Sig Dispense Refill     azithromycin (ZITHROMAX) 250 MG tablet Take 2 tablets (500 mg) by mouth daily for 1 day, THEN 1 tablet (250 mg) daily for 4 days. 6 tablet 0     ACETAMINOPHEN PO Take 1,000 mg by mouth every 6 hours as needed for pain Not to exceed 4000 mg/day       ADVAIR DISKUS 250-50 MCG/DOSE diskus inhaler Inhale 1 puff into the lungs 2 times daily 60 Inhaler 11     albuterol (PROVENTIL) (2.5 MG/3ML) 0.083% neb solution INHALE 1 VIAL VIA NEBULIZER EVERY 6 HOURS AS NEEDED 360 mL 11     alendronate (FOSAMAX) 70 MG tablet Take 1 tablet (70 mg) by mouth with 8oz water every 7 days 30 minutes before breakfast and remain upright during this time. 12 tablet 3     aspirin EC 81 MG EC tablet Take 1 tablet (81 mg) by mouth daily 90 tablet 3     atorvastatin (LIPITOR) 40 MG tablet TAKE 1 TAB BY MOUTH ONCE DAILY 30 tablet 11     blood glucose monitoring (ONE TOUCH ULTRA 2) meter device kit Use to test blood sugars 3 times daily or as directed. 1 kit 0     blood glucose monitoring (ONE TOUCH ULTRA) test strip Use to test blood sugars 3 times daily or as directed. 3 Box 3     blood glucose monitoring (ONE TOUCH ULTRASOFT) lancets Use to test blood sugar 3 times daily or as directed. 100 each PRN     brimonidine (ALPHAGAN) 0.2 % ophthalmic solution Place 1 drop into the right eye 2 times daily 1 Bottle 11     capsaicin-menthol-methyl sal 0.025-1-12 % external cream Apply 1 Application topically daily as needed (topical pain) 56.6 g 1     cetirizine (ZYRTEC) 10 MG tablet Take 1 tablet (10 mg) by mouth every evening 30 tablet 3     Cholecalciferol (VITAMIN D) 2000 UNITS tablet Take 2,000 Units by mouth daily. 100 tablet 3      clotrimazole (LOTRIMIN) 1 % cream INSERT 1 APPLICATORFUL VAGINALLY TWICE A DAY FOR VAGINAL PAIN 12 g 0     clotrimazole (LOTRIMIN) 1 % external cream Apply topically 2 times daily 12 g 0     dextromethorphan (DELSYM) 30 MG/5ML liquid Take 10 mLs (60 mg) by mouth 2 times daily 148 mL 0     diclofenac (VOLTAREN) 1 % GEL topical gel Apply 2 g topically 4 times daily to hands 100 g 11     dorzolamide (TRUSOPT) 2 % ophthalmic solution Place 1 drop into the right eye 2 times daily 1 Bottle 11     EPINEPHrine (EPIPEN/ADRENACLICK/OR ANY BX GENERIC EQUIV) 0.3 MG/0.3ML injection 2-pack Inject 0.3 mLs (0.3 mg) into the muscle as needed for anaphylaxis 0.6 mL 1     escitalopram (LEXAPRO) 10 MG tablet TAKE 1 TAB BY MOUTH ONCE DAILY 30 tablet 11     ferrous sulfate (IRON) 325 (65 FE) MG tablet Take 1 tablet (325 mg) by mouth 2 times daily With meals 60 tablet 2     fluticasone (FLONASE) 50 MCG/ACT nasal spray Spray 1 spray into both nostrils daily       lactulose (CHRONULAC) 10 GM/15ML solution Take 20 g by mouth as needed for constipation       latanoprost (XALATAN) 0.005 % ophthalmic solution Place 1 drop into both eyes At Bedtime 2.5 mL 11     levETIRAcetam (KEPPRA) 500 MG tablet TAKE 1 TAB BY MOUTH TWICE A DAY 60 tablet 5     lidocaine (LIDODERM) 5 % patch APPLY 1 PATCHES TO PAINFUL AREA AT ONCE FOR UP TO 12 HOURS WITHIN A 24 HOUR PERIOD REMOVE AFTER 12 HOURS 30 patch 1     lisinopril (PRINIVIL/ZESTRIL) 20 MG tablet TAKE 1 TAB BY MOUTH ONCE DAILY 30 tablet 11     metFORMIN (GLUCOPHAGE) 500 MG tablet TAKE 1 TAB BY MOUTH IN THE EVENING WITH DINNER 30 tablet 5     metoprolol succinate ER (TOPROL-XL) 50 MG 24 hr tablet TAKE 1 TAB BY MOUTH ONCE DAILY 30 tablet 11     nicotine (NICODERM CQ) 14 MG/24HR 24 hr patch Place 1 patch onto the skin every 24 hours 30 patch 3     nitroglycerin (NITROSTAT) 0.4 MG SL tablet Place 1 tablet (0.4 mg) under the tongue every 5 minutes as needed for chest pain if you are still having symptoms after  3 doses (15 minutes) call 911. 25 tablet 1     nystatin (MYCOSTATIN) 927455 UNIT/ML suspension Take 5 mLs (500,000 Units) by mouth 4 times daily - swish and spit for 7 days 60 mL 1     ondansetron (ZOFRAN-ODT) 8 MG ODT tab Take 1 tablet (8 mg) by mouth every 8 hours as needed for nausea 30 tablet 0     order for DME autoPAP 12-15 cmH20 1 Device 0     order for DME Equipment being ordered: air pressure mattress 1 Device 0     order for DME Equipment being ordered: Prosthetic legs 2 each 0     order for DME Equipment being ordered: lift recliner 1 Device 0     order for DME Equipment being ordered: Hospital Bed with side rails 1 each 0     order for DME Equipment being ordered: Power Wheel Chair 1 Device 0     order for DME Equipment being ordered: bandage tape, prefer paper 1 Package 0     order for DME Full electric hospital bed with half rails    Dx: T08404, I110, J449  Length of need: lifetime 1 Device 0     order for DME Wheel Chair Cushion: 18 x 18 inch Roho cushion 1 Device 0     order for DME Hospital bed with side rails 1 Device 0     order for DME Equipment being ordered: CPAP supplies mask, hose, filters, cushion    fax to [facility] at [phone] Device prn     order for DME Equipment being ordered: Nebulizer and tubing supplies 1 Units 0     order for DME Equipment being ordered: Glucerna daily shakes with each meal 1 Box 11     ORDER FOR DME Equipment being ordered: Depends briefs 1 Month 11     oxyCODONE (ROXICODONE) 5 MG tablet TAKE 1 TABLET BY MOUTH EVERY 6 HOURS AS NEEDED  0     pantoprazole (PROTONIX) 40 MG EC tablet TAKE 1 TAB BY MOUTH ONCE DAILY 30 tablet 11     phenytoin (DILANTIN) 100 MG capsule TAKE 2 CAPS (200MG) BY MOUTH TWICE A  capsule 11     polyethylene glycol (MIRALAX/GLYCOLAX) Packet Take 17 g by mouth daily Dissolved in water or juice        pregabalin (LYRICA) 75 MG capsule Take 150 mg by mouth 3 times daily       risperiDONE (RISPERDAL) 0.5 MG tablet TAKE 1 TAB BY  MOUTH AT BEDTIME 30 tablet 5     sennosides (SENOKOT) 8.6 MG tablet Take 1 tablet by mouth 2 times daily        solifenacin (VESICARE) 10 MG tablet TAKE 1 TAB BY MOUTH ONCE DAILY 30 tablet 11     sucralfate (CARAFATE) 1 GM tablet TAKE 1 TAB BY MOUTH FOUR TIMES DAILY. MAY DISSOLVE IN 10ML WATER ISNECESSARY 120 tablet 11     traZODone (DESYREL) 150 MG tablet TAKE 1 TAB BY MOUTH AT BEDTIME 30 tablet 11     umeclidinium (INCRUSE ELLIPTA) 62.5 MCG/INH inhaler Inhale 1 puff into the lungs daily 1 Inhaler 6     VENTOLIN  (90 Base) MCG/ACT inhaler Inhale 2 puffs into the lungs every 6 hours as needed for shortness of breath / dyspnea or wheezing 8.5 g 11     Allergies   Allergen Reactions     Bee Venom      Penicillins Anaphylaxis     Other reaction(s): Skin Rash and/or Hives. Tolerated cefepime in 12/2016     Dilantin [Phenytoin] Other (See Comments)     Generic dilantin only per pt     Iodide Hives     09/11/15: Pt states she can use iodine and doesn't have any problems      Iodine-131      Novocaine [Procaine] Hives     Other reaction(s): Skin Rash and/or Hives     Tositumomab      BP Readings from Last 3 Encounters:   01/09/20 134/74   12/02/19 (!) 155/79   11/19/19 98/60    Wt Readings from Last 3 Encounters:   01/09/20 56.2 kg (124 lb)   11/19/19 58.5 kg (129 lb)   11/04/19 59 kg (130 lb)           Reviewed and updated as needed this visit by Provider         Review of Systems   ROS COMP: CONSTITUTIONAL: NEGATIVE for fever, chills, change in weight  ENT/MOUTH: NEGATIVE for ear, mouth and throat problems  CV: NEGATIVE for chest pain, palpitations  GI: NEGATIVE for nausea, abdominal pain, heartburn, or change in bowel habits  : NEGATIVE for frequency, dysuria, or hematuria  MUSCULOSKELETAL: NEGATIVE for significant arthralgias or myalgia  NEURO: NEGATIVE for weakness, dizziness or paresthesias  HEME: NEGATIVE for bleeding problems  PSYCHIATRIC: NEGATIVE for changes in mood or affect      Objective    /80   " Pulse 78   Temp 98  F (36.7  C) (Oral)   Resp 15   Wt 56.2 kg (124 lb)   SpO2 99%   BMI 47.15 kg/m    Body mass index is 47.15 kg/m .  Physical Exam   GENERAL: alert and no distress with mild cough with inspiration  EYES: Eyes grossly normal to inspection, PERRL and conjunctivae and sclerae normal  HENT: ear canals and TM's normal, nose and mouth without ulcers or lesions  NECK: no adenopathy, no asymmetry, masses, or scars and thyroid normal to palpation  RESP: lungs clear to auscultation - no distinct rales, rhonchi or wheezes  CV: regular rate and rhythm, normal S1 S2, no S3 or S4, no click or rub  MS: There are bilateral lower extremity amputations noted and the patient is in a motorized wheelchair  PSYCH: mentation appears normal, affect normal/bright        Assessment & Plan     1. Upper respiratory tract infection, unspecified type  Are likely viral in etiology but patient has a longstanding history of recurrent bronchitis as well as tobacco use.  She has responded to Zithromax in the past thus we will refill.  Supportive care is been recommended.  - azithromycin (ZITHROMAX) 250 MG tablet; Take 2 tablets (500 mg) by mouth daily for 1 day, THEN 1 tablet (250 mg) daily for 4 days.  Dispense: 6 tablet; Refill: 0    Smoking cessation was again advised and the risks of continued smoking in regards to this patients health history was reiterated. Options of smoking cessation were also discussed. This time extended beyond the routine exam.     BMI:   Estimated body mass index is 47.15 kg/m  as calculated from the following:    Height as of 1/9/20: 1.092 m (3' 7\").    Weight as of this encounter: 56.2 kg (124 lb).   Weight management plan: Discussed healthy diet and exercise guidelines    See Patient Instructions    Return in about 2 weeks (around 2/4/2020) for if symptoms recur or worsen.    Allan Casey MD  [facility]      "

## 2020-01-24 ENCOUNTER — DOCUMENTATION ONLY (OUTPATIENT)
Dept: SLEEP MEDICINE | Facility: CLINIC | Age: 70
End: 2020-01-24

## 2020-01-24 DIAGNOSIS — G47.39 COMPLEX SLEEP APNEA SYNDROME: ICD-10-CM

## 2020-01-24 DIAGNOSIS — G47.33 OSA (OBSTRUCTIVE SLEEP APNEA): ICD-10-CM

## 2020-01-24 NOTE — PROGRESS NOTES
STM Recheck Visit    Subjective measures:   Pt states that she is still recovering bronchitis so she isn't able to wear it all night due to coughing and her nose running.  She knows she needs to meet compliance and will continue to wear it as much as possible. She has no issues or complaints when she is able to use it and is benefiting from therapy.    Assessment: Pt not meeting objective benchmarks for compliance  Patient meeting subjective benchmarks.   Action plan: 2 week STM recheck appt scheduled  Patient has a follow up visit with Dr. Wright on 3/4/20  Device type: Auto-CPAP  PAP settings: CPAP min 12 cm  H20     CPAP max 15 cm  H20     90th % cegtzzxi64.1 cm  H20    Objective measures: 14 day rolling measures         Compliance  28 %     % of night spent in large leak  7 % last  upload      AHI 7.64   last  upload      Average number of minutes 163    Diagnostic AHI: 14           Objective measure goal  Compliance   Goal >70%  Leak   Goal < 10%  AHI  Goal < 5  Usage  Goal >240

## 2020-01-28 ENCOUNTER — OFFICE VISIT (OUTPATIENT)
Dept: INTERNAL MEDICINE | Facility: CLINIC | Age: 70
End: 2020-01-28
Payer: COMMERCIAL

## 2020-01-28 VITALS
RESPIRATION RATE: 16 BRPM | HEART RATE: 86 BPM | TEMPERATURE: 98.4 F | DIASTOLIC BLOOD PRESSURE: 72 MMHG | SYSTOLIC BLOOD PRESSURE: 136 MMHG | OXYGEN SATURATION: 96 %

## 2020-01-28 DIAGNOSIS — Z89.611 S/P AKA (ABOVE KNEE AMPUTATION) BILATERAL (H): Primary | Chronic | ICD-10-CM

## 2020-01-28 DIAGNOSIS — T63.444D BEE STING REACTION, UNDETERMINED INTENT, SUBSEQUENT ENCOUNTER: ICD-10-CM

## 2020-01-28 DIAGNOSIS — Z89.612 S/P AKA (ABOVE KNEE AMPUTATION) BILATERAL (H): Primary | Chronic | ICD-10-CM

## 2020-01-28 PROCEDURE — 99214 OFFICE O/P EST MOD 30 MIN: CPT | Performed by: INTERNAL MEDICINE

## 2020-01-28 RX ORDER — EPINEPHRINE 0.3 MG/.3ML
0.3 INJECTION SUBCUTANEOUS PRN
Qty: 0.6 ML | Refills: 1 | Status: SHIPPED | OUTPATIENT
Start: 2020-01-28 | End: 2020-02-15

## 2020-01-28 NOTE — PROGRESS NOTES
Chito Foote is a 69 year old female who presents to clinic today for the following health issues:    CORI Hassan is here again to discuss issues centering around 2 primary concerns.    First, she apparently has got some temporary prosthetic legs that she has been using that have been designed and treated for her by a prosthetists.  He is accompanying her to the clinic today in need of a assessment for him to continue with ongoing issues to help her acquire her permanent prosthetic lower extremities that she is using due to her bilateral lower extremity leg amputations.    She has been fairly successful using the temporary prosthetics according to the prosthetists and he feels that if in fact she were to get permanent ones that she would be able to function at a level 3 which states that she should have the ability or potential for ambulation with variable stephy to most environmental barriers and may have vocational, therapeutic or exercise activity demands that can be met by prosthetic utilization beyond simple locomotion.    She is currently in a wheelchair and is using a motorized wheelchair, which brings us to her second request which is that she can get lower wheelchair leg lifts placed on her wheelchair.  It was unclear to me whether or not she actually needed these leg devices but she states that she is having difficulty reaching underneath her wheelchair to get the lever that allows her to adjust her foot pedals so that she can lift up her prosthetic legs accordingly.  She is asking for a subsequent motorized leg lift system be added to her wheelchair.    Currently she is wheelchair-bound and without her prosthetic legs is totally dependent upon her wheelchair for ambulatory status.  She states that she feels that she has difficulty with her upper extremities at times trying to operate some of the functional levers of her wheelchair.    She also requests a refill of her bee sting  EpiPen    Patient Active Problem List   Diagnosis     Seizure disorder (H)     Osteoporosis     Schizoaffective disorder (H)     AS (sickle cell trait) (H)     Vertigo     Person who has had sex change operation     Intestinal malabsorption     Chronic neck pain     Chronic pain syndrome     Hx of colonic polyp     Iron deficiency anemia     Degeneration of intervertebral disc of lumbosacral region     Thoracic or lumbosacral neuritis or radiculitis     Androgen insensitivity syndrome     PAD (peripheral artery disease) (H)     Stenosis of carotid artery     Osteoarthritis     Essential hypertension     Type 2 diabetes mellitus with diabetic peripheral angiopathy without gangrene, without long-term current use of insulin (H)     Anxiety disorder     Lumbosacral radiculitis     Peripheral neuropathy     Primary open angle glaucoma of both eyes, severe stage     Pseudophakia of right eye     Cataract, left eye     Diabetes mellitus type 2 without retinopathy (H)     Simple chronic bronchitis (H)     JOSE (obstructive sleep apnea)-      Complex sleep apnea syndrome     Coronary artery disease involving native coronary artery of native heart     Bee sting reaction, undetermined intent, subsequent encounter     Functional incontinence     History of recurrent UTIs     Dysphagia     Incontinence     Migraine headache     History of CVA (cerebrovascular accident)     Tobacco abuse     S/P AKA (above knee amputation) bilateral (H)     Chronic, continuous use of opioids     Other neuromuscular dysfunction of bladder     Hyperlipidemia LDL goal <100     Blind left eye     Chronic back pain     Legally blind in right eye, as defined in [place]     Polypharmacy     Esophageal reflux     Suprapubic catheter (H)     Neurogenic bladder     Past Surgical History:   Procedure Laterality Date     AMPUTATE LEG ABOVE KNEE Left 6/11/2016    Procedure: AMPUTATE LEG ABOVE KNEE;  Surgeon: Mello Rodriguez MD;  [facility]     AMPUTATE  LEG BELOW KNEE Right 11/7/2016    Procedure: AMPUTATE LEG BELOW KNEE;  Surgeon: Savannah Durant MD;  [facility]     AMPUTATE REVISION STUMP LOWER EXTREMITY Right 11/11/2016    Procedure: AMPUTATE REVISION STUMP LOWER EXTREMITY;  Surgeon: Savannah Durant MD;  [facility]     AMPUTATE REVISION STUMP LOWER EXTREMITY Right 11/16/2016    Procedure: AMPUTATE REVISION STUMP LOWER EXTREMITY;  Surgeon: Savannah Durant MD;  [facility]     AMPUTATE TOE(S) Right 1/5/2016    Procedure: AMPUTATE TOE(S);  Surgeon: Mello Gaines DPM;  Location: [facility]     ANGIOGRAM Bilateral 11/21/2014    Procedure: ANGIOGRAM;  Surgeon: Savannah Durant MD;  [facility]     ANGIOGRAM Left 1/16/2015    Procedure: ANGIOGRAM;  Surgeon: Savannah Durant MD;  [facility]     ANGIOGRAM Bilateral 9/14/2015    Procedure: ANGIOGRAM;  Surgeon: Savannah Durant MD;  [facility]     ANGIOGRAM Left 10/12/2015    Procedure: ANGIOGRAM;  Surgeon: Savannah Durant MD;  [facility]     ANGIOGRAM Right 6/6/2016    Procedure: ANGIOGRAM;  Surgeon: Savannah Durant MD;  [facility]     ANGIOPLASTY Right 6/6/2016    Procedure: ANGIOPLASTY;  Surgeon: Savannah Durant MD;  [facility]     APPENDECTOMY       BREAST SURGERY      right breast bx (benign)     CATARACT IOL, RT/LT Right      CHOLECYSTECTOMY       COLONOSCOPY N/A 8/25/2014    Procedure: COLONOSCOPY;  Surgeon: Mello Ferrer MD;  [facility]     COLONOSCOPY WITH CO2 INSUFFLATION N/A 8/20/2014    Procedure: COLONOSCOPY WITH CO2 INSUFFLATION;  Surgeon: Duane, William Charles, MD;  Location: [facility]     CYSTOSCOPY, INTRAVESICAL INJECTION N/A 10/31/2019    Procedure: CYSTOSCOPY, BOTOX INJECTION, Suprapubic Catheter Exchange;  Surgeon: Loki Gordon MD;  [facility]     CYSTOSTOMY, INSERT TUBE SUPRAPUBIC, COMBINED N/A 1/16/2018    Procedure: COMBINED CYSTOSTOMY, INSERT TUBE SUPRAPUBIC;  Cystoscopy, Intraoperative Ultrasound, Suprapubic Tube Placement;  Surgeon: Keanu Dawson  MD Vasu;  [facility]     ENDARTERECTOMY FEMORAL  5/23/2014    Procedure: ENDARTERECTOMY FEMORAL;  Surgeon: Jason Joshi MD;  [facility]     ESOPHAGOSCOPY, GASTROSCOPY, DUODENOSCOPY (EGD), COMBINED  12/14/2012    Procedure: COMBINED ESOPHAGOSCOPY, GASTROSCOPY, DUODENOSCOPY (EGD), BIOPSY SINGLE OR MULTIPLE;  ESOPHAGOSCOPY, GASTROSCOPY, DUODENOSCOPY (EGD), DILATATION ;  Surgeon: Elizabeth Stevenson MD;  Location:  GI     ESOPHAGOSCOPY, GASTROSCOPY, DUODENOSCOPY (EGD), COMBINED  12/31/2013    Procedure: COMBINED ESOPHAGOSCOPY, GASTROSCOPY, DUODENOSCOPY (EGD), BIOPSY SINGLE OR MULTIPLE;;  Surgeon: Clemente Lopez MD;  Location:  GI     ESOPHAGOSCOPY, GASTROSCOPY, DUODENOSCOPY (EGD), COMBINED  4/1/2014    Procedure: COMBINED ESOPHAGOSCOPY, GASTROSCOPY, DUODENOSCOPY (EGD);;  Surgeon: Clemente Lopez MD;  Location:  GI     ESOPHAGOSCOPY, GASTROSCOPY, DUODENOSCOPY (EGD), COMBINED  6/28/2014    Procedure: COMBINED ESOPHAGOSCOPY, GASTROSCOPY, DUODENOSCOPY (EGD);  Surgeon: Clemente Lopez MD;  Location:  GI     ESOPHAGOSCOPY, GASTROSCOPY, DUODENOSCOPY (EGD), COMBINED N/A 8/20/2014    Procedure: COMBINED ESOPHAGOSCOPY, GASTROSCOPY, DUODENOSCOPY (EGD), BIOPSY SINGLE OR MULTIPLE;  Surgeon: Duane, William Charles, MD;  Location:  OR     ESOPHAGOSCOPY, GASTROSCOPY, DUODENOSCOPY (EGD), COMBINED N/A 8/22/2014    Procedure: COMBINED ESOPHAGOSCOPY, GASTROSCOPY, DUODENOSCOPY (EGD), BIOPSY SINGLE OR MULTIPLE;  Surgeon: Mello Ferrer MD;  Location:  GI     ESOPHAGOSCOPY, GASTROSCOPY, DUODENOSCOPY (EGD), COMBINED N/A 10/2/2014    Procedure: COMBINED ESOPHAGOSCOPY, GASTROSCOPY, DUODENOSCOPY (EGD), BIOPSY SINGLE OR MULTIPLE;  Surgeon: Remy Haskins MD;  Location:  GI     ESOPHAGOSCOPY, GASTROSCOPY, DUODENOSCOPY (EGD), COMBINED Left 12/15/2014    Procedure: COMBINED ESOPHAGOSCOPY, GASTROSCOPY, DUODENOSCOPY (EGD), BIOPSY SINGLE OR MULTIPLE;  Surgeon: Remy Haskins MD;  Location:  GI      ESOPHAGOSCOPY, GASTROSCOPY, DUODENOSCOPY (EGD), COMBINED N/A 2/25/2015    Procedure: COMBINED ENDOSCOPIC ULTRASOUND, ESOPHAGOSCOPY, GASTROSCOPY, DUODENOSCOPY (EGD), FINE NEEDLE ASPIRATE/BIOPSY;  Surgeon: Clemente Lugo MD;  [facility]     ESOPHAGOSCOPY, GASTROSCOPY, DUODENOSCOPY (EGD), COMBINED Left 2/25/2015    Procedure: COMBINED ESOPHAGOSCOPY, GASTROSCOPY, DUODENOSCOPY (EGD), BIOPSY SINGLE OR MULTIPLE;  Surgeon: Clemente Lugo MD;  [facility]     ESOPHAGOSCOPY, GASTROSCOPY, DUODENOSCOPY (EGD), COMBINED N/A 9/25/2016    Procedure: COMBINED ESOPHAGOSCOPY, GASTROSCOPY, DUODENOSCOPY (EGD);  Surgeon: Aziza Patiño MD;  [facility]     ESOPHAGOSCOPY, GASTROSCOPY, DUODENOSCOPY (EGD), COMBINED N/A 1/18/2017    Procedure: COMBINED ESOPHAGOSCOPY, GASTROSCOPY, DUODENOSCOPY (EGD), BIOPSY SINGLE OR MULTIPLE;  Surgeon: Clemente Lopez MD;  [facility]     ESOPHAGOSCOPY, GASTROSCOPY, DUODENOSCOPY (EGD), COMBINED N/A 11/26/2017    Procedure: COMBINED ESOPHAGOSCOPY, GASTROSCOPY, DUODENOSCOPY (EGD), REMOVE FOREIGN BODY;  Esophagogastroduodenoscopy with foreign body extraction  ;  Surgeon: Herberth Castrejon MD;  [facility]     ESOPHAGOSCOPY, GASTROSCOPY, DUODENOSCOPY (EGD), COMBINED N/A 11/26/2017    Procedure: COMBINED ESOPHAGOSCOPY, GASTROSCOPY, DUODENOSCOPY (EGD), REMOVE FOREIGN BODY;;  Surgeon: Herberth Castrejon MD;  [facility]     FASCIOTOMY LOWER EXTREMITY Left 6/10/2016    Procedure: FASCIOTOMY LOWER EXTREMITY;  Surgeon: Mello Rodriguez MD;  [facility]     HC CAPSULE ENDOSCOPY N/A 8/25/2014    Procedure: CAPSULE/PILL CAM ENDOSCOPY;  Surgeon: Remy Haskins MD;  [facility]     HC CAPSULE ENDOSCOPY N/A 10/2/2014    Procedure: CAPSULE/PILL CAM ENDOSCOPY;  Surgeon: Remy Haskins MD;  [facility]     ORTHOPEDIC SURGERY      broken wrist repair     SEX TRANSFORMATION SURGERY, MALE TO FEMALE  1974 1974     SINUS SURGERY      cyst removed      TONSILLECTOMY       VASCULAR SURGERY  2006    Left carotid stent       Social History     Tobacco Use     Smoking status: Current Every Day Smoker     Packs/day: 0.25     Years: 30.00     Pack years: 7.50     Types: Cigarettes, Cigars     Smokeless tobacco: Never Used   Substance Use Topics     Alcohol use: No     Alcohol/week: 0.0 standard drinks     Family History   Problem Relation Age of Onset     Dementia Mother      Glaucoma Mother      Diabetes Mother         may have been type 1, childhood     Coronary Artery Disease Mother         MI     Glaucoma Father      Diabetes Father         may hev been type 1 - ??     Heart Failure Father      Cancer Maternal Aunt         leukemia     Schizophrenia Brother      Depression Brother      Suicide Sister      Depression Sister      Diabetes Sister      Cancer Maternal Aunt         ovarian     Glaucoma Maternal Grandmother      Diabetes Maternal Grandmother      Glaucoma Maternal Grandfather      Diabetes Maternal Grandfather      Glaucoma Paternal Grandmother      Diabetes Paternal Grandmother      Glaucoma Paternal Grandfather      Diabetes Paternal Grandfather      Breast Cancer Sister      Cerebrovascular Disease Brother      Colon Cancer No family hx of      Macular Degeneration No family hx of          Current Outpatient Medications   Medication Sig Dispense Refill     ACETAMINOPHEN PO Take 1,000 mg by mouth every 6 hours as needed for pain Not to exceed 4000 mg/day       ADVAIR DISKUS 250-50 MCG/DOSE diskus inhaler Inhale 1 puff into the lungs 2 times daily 60 Inhaler 11     albuterol (PROVENTIL) (2.5 MG/3ML) 0.083% neb solution INHALE 1 VIAL VIA NEBULIZER EVERY 6 HOURS AS NEEDED 360 mL 11     alendronate (FOSAMAX) 70 MG tablet Take 1 tablet (70 mg) by mouth with 8oz water every 7 days 30 minutes before breakfast and remain upright during this time. 12 tablet 3     aspirin EC 81 MG EC tablet Take 1 tablet (81 mg) by mouth daily 90 tablet 3     atorvastatin  (LIPITOR) 40 MG tablet TAKE 1 TAB BY MOUTH ONCE DAILY 30 tablet 11     blood glucose monitoring (ONE TOUCH ULTRA 2) meter device kit Use to test blood sugars 3 times daily or as directed. 1 kit 0     blood glucose monitoring (ONE TOUCH ULTRA) test strip Use to test blood sugars 3 times daily or as directed. 3 Box 3     blood glucose monitoring (ONE TOUCH ULTRASOFT) lancets Use to test blood sugar 3 times daily or as directed. 100 each PRN     brimonidine (ALPHAGAN) 0.2 % ophthalmic solution Place 1 drop into the right eye 2 times daily 1 Bottle 11     capsaicin-menthol-methyl sal 0.025-1-12 % external cream Apply 1 Application topically daily as needed (topical pain) 56.6 g 1     cetirizine (ZYRTEC) 10 MG tablet Take 1 tablet (10 mg) by mouth every evening 30 tablet 3     Cholecalciferol (VITAMIN D) 2000 UNITS tablet Take 2,000 Units by mouth daily. 100 tablet 3     clotrimazole (LOTRIMIN) 1 % cream INSERT 1 APPLICATORFUL VAGINALLY TWICE A DAY FOR VAGINAL PAIN 12 g 0     clotrimazole (LOTRIMIN) 1 % external cream Apply topically 2 times daily 12 g 0     dextromethorphan (DELSYM) 30 MG/5ML liquid Take 10 mLs (60 mg) by mouth 2 times daily 148 mL 0     diclofenac (VOLTAREN) 1 % GEL topical gel Apply 2 g topically 4 times daily to hands 100 g 11     dorzolamide (TRUSOPT) 2 % ophthalmic solution Place 1 drop into the right eye 2 times daily 1 Bottle 11     EPINEPHrine (EPIPEN/ADRENACLICK/OR ANY BX GENERIC EQUIV) 0.3 MG/0.3ML injection 2-pack Inject 0.3 mLs (0.3 mg) into the muscle as needed for anaphylaxis 0.6 mL 1     escitalopram (LEXAPRO) 10 MG tablet TAKE 1 TAB BY MOUTH ONCE DAILY 30 tablet 11     ferrous sulfate (IRON) 325 (65 FE) MG tablet Take 1 tablet (325 mg) by mouth 2 times daily With meals 60 tablet 2     fluticasone (FLONASE) 50 MCG/ACT nasal spray Spray 1 spray into both nostrils daily       lactulose (CHRONULAC) 10 GM/15ML solution Take 20 g by mouth as needed for constipation       latanoprost (XALATAN)  0.005 % ophthalmic solution Place 1 drop into both eyes At Bedtime 2.5 mL 11     levETIRAcetam (KEPPRA) 500 MG tablet TAKE 1 TAB BY MOUTH TWICE A DAY 60 tablet 5     lidocaine (LIDODERM) 5 % patch APPLY 1 PATCHES TO PAINFUL AREA AT ONCE FOR UP TO 12 HOURS WITHIN A 24 HOUR PERIOD REMOVE AFTER 12 HOURS 30 patch 1     lisinopril (PRINIVIL/ZESTRIL) 20 MG tablet TAKE 1 TAB BY MOUTH ONCE DAILY 30 tablet 11     metFORMIN (GLUCOPHAGE) 500 MG tablet TAKE 1 TAB BY MOUTH IN THE EVENING WITH DINNER 30 tablet 5     metoprolol succinate ER (TOPROL-XL) 50 MG 24 hr tablet TAKE 1 TAB BY MOUTH ONCE DAILY 30 tablet 11     nicotine (NICODERM CQ) 14 MG/24HR 24 hr patch Place 1 patch onto the skin every 24 hours 30 patch 3     nitroglycerin (NITROSTAT) 0.4 MG SL tablet Place 1 tablet (0.4 mg) under the tongue every 5 minutes as needed for chest pain if you are still having symptoms after 3 doses (15 minutes) call 911. 25 tablet 1     nystatin (MYCOSTATIN) 093268 UNIT/ML suspension Take 5 mLs (500,000 Units) by mouth 4 times daily - swish and spit for 7 days 60 mL 1     ondansetron (ZOFRAN-ODT) 8 MG ODT tab Take 1 tablet (8 mg) by mouth every 8 hours as needed for nausea 30 tablet 0     order for DME autoPAP 12-15 cmH20 1 Device 0     order for DME Equipment being ordered: air pressure mattress 1 Device 0     order for DME Equipment being ordered: Prosthetic legs 2 each 0     order for DME Equipment being ordered: lift recliner 1 Device 0     order for DME Equipment being ordered: Hospital Bed with side rails 1 each 0     order for DME Equipment being ordered: Power Wheel Chair 1 Device 0     order for DME Equipment being ordered: bandage tape, prefer paper 1 Package 0     order for DME Full electric hospital bed with half rails    Dx: Z44753, I110, J449  Length of need: lifetime 1 Device 0     order for DME Wheel Chair Cushion: 18 x 18 inch Roho cushion 1 Device 0     order for DME Hospital bed with side rails 1 Device 0     order for  DME Equipment being ordered: CPAP supplies mask, hose, filters, cushion    fax to [facility] at [phone] 10 Device prn     order for DME Equipment being ordered: Nebulizer and tubing supplies 1 Units 0     order for DME Equipment being ordered: Glucerna daily shakes with each meal 1 Box 11     ORDER FOR DME Equipment being ordered: Depends briefs 1 Month 11     oxyCODONE (ROXICODONE) 5 MG tablet TAKE 1 TABLET BY MOUTH EVERY 6 HOURS AS NEEDED  0     pantoprazole (PROTONIX) 40 MG EC tablet TAKE 1 TAB BY MOUTH ONCE DAILY 30 tablet 11     phenytoin (DILANTIN) 100 MG capsule TAKE 2 CAPS (200MG) BY MOUTH TWICE A  capsule 11     polyethylene glycol (MIRALAX/GLYCOLAX) Packet Take 17 g by mouth daily Dissolved in water or juice        pregabalin (LYRICA) 75 MG capsule Take 150 mg by mouth 3 times daily       risperiDONE (RISPERDAL) 0.5 MG tablet TAKE 1 TAB BY MOUTH AT BEDTIME 30 tablet 5     sennosides (SENOKOT) 8.6 MG tablet Take 1 tablet by mouth 2 times daily        solifenacin (VESICARE) 10 MG tablet TAKE 1 TAB BY MOUTH ONCE DAILY 30 tablet 11     sucralfate (CARAFATE) 1 GM tablet TAKE 1 TAB BY MOUTH FOUR TIMES DAILY. MAY DISSOLVE IN 10ML WATER ISNECESSARY 120 tablet 11     traZODone (DESYREL) 150 MG tablet TAKE 1 TAB BY MOUTH AT BEDTIME 30 tablet 11     umeclidinium (INCRUSE ELLIPTA) 62.5 MCG/INH inhaler Inhale 1 puff into the lungs daily 1 Inhaler 6     VENTOLIN  (90 Base) MCG/ACT inhaler Inhale 2 puffs into the lungs every 6 hours as needed for shortness of breath / dyspnea or wheezing 8.5 g 11     Allergies   Allergen Reactions     Bee Venom      Penicillins Anaphylaxis     Other reaction(s): Skin Rash and/or Hives. Tolerated cefepime in 12/2016     Dilantin [Phenytoin] Other (See Comments)     Generic dilantin only per pt     Iodide Hives     09/11/15: Pt states she can use iodine and doesn't have any problems      Iodine-131      Novocaine [Procaine] Hives     Other reaction(s): Skin Rash  and/or Hives     Tositumomab      BP Readings from Last 3 Encounters:   01/21/20 132/80   01/09/20 134/74   12/02/19 (!) 155/79    Wt Readings from Last 3 Encounters:   01/21/20 56.2 kg (124 lb)   01/09/20 56.2 kg (124 lb)   11/19/19 58.5 kg (129 lb)            Reviewed and updated as needed this visit by Provider         Review of Systems   ROS COMP: CONSTITUTIONAL: NEGATIVE for fever, chills, change in weight  ENT/MOUTH: NEGATIVE for ear, mouth and throat problems  RESP: NEGATIVE for significant cough or SOB  CV: NEGATIVE for chest pain, palpitations or peripheral edema  GI: NEGATIVE for nausea, abdominal pain, heartburn, or change in bowel habits  : NEGATIVE for frequency, dysuria, or hematuria  HEME: NEGATIVE for bleeding problems  PSYCHIATRIC: NEGATIVE for changes in mood or affect      Objective    /72   Pulse 86   Temp 98.4  F (36.9  C) (Oral)   Resp 16   SpO2 96%   There is no height or weight on file to calculate BMI.  Physical Exam   GENERAL: alert and no distress  MS: In wheelchair with bilateral AKA's noted  NEURO: Normal strength and tone UE's  PSYCH: mentation appears normal, affect normal/bright        Assessment & Plan     1. S/P AKA (above knee amputation) bilateral (H)  Sonya has a generally preserved examination less issues of her bilateral lower extremity leg amputations.  This is impairing her gait as well as her balance and lower extremity coordination.  She has undergone routine postoperative care after her amputations and is now to the point of where prosthetic lower extremity legs will help her function more independently.  Is my understanding through use of her prosthetic lower extremities that she should be able to ambulate and obtain an improved functional level.  She has multiple comorbidities associated with her general health that would favor and benefit her and being able to be up and ambulatory as best as possible.  She will likely still require the use of a wheelchair  when the prosthesis is not in use.    Her generalized ability to participate in all activities of daily living are compromised due to the wheelchair and the use of prosthetic lower extremities bilaterally will improve her ability to function independently.    It is unclear to me at this point whether or not the addition of a motorized leg elevation device to her wheelchair will be in need of once she is able to functionally use her prosthetics without complication.    I would refer to her multiple medical health issues above.    2. Bee sting reaction, undetermined intent, subsequent encounter  Refilled per patient request.  - EPINEPHrine (EPIPEN/ADRENACLICK/OR ANY BX GENERIC EQUIV) 0.3 MG/0.3ML injection 2-pack; Inject 0.3 mLs (0.3 mg) into the muscle as needed for anaphylaxis  Dispense: 0.6 mL; Refill: 1       See Patient Instructions    Return in about 1 month (around 2/28/2020) for if symptoms recur or worsen.    Allan Casey MD  [facility]

## 2020-01-28 NOTE — NURSING NOTE
"Vital signs:  Temp: 98.4  F (36.9  C) Temp src: Oral BP: (!) 166/72 Pulse: 86   Resp: 16 SpO2: 96 %     Height: (unable to obtain) Weight: (unable to obtain)  Estimated body mass index is 47.15 kg/m  as calculated from the following:    Height as of 1/9/20: 1.092 m (3' 7\").    Weight as of 1/21/20: 56.2 kg (124 lb).        "

## 2020-02-05 ENCOUNTER — TRANSFERRED RECORDS (OUTPATIENT)
Dept: HEALTH INFORMATION MANAGEMENT | Facility: CLINIC | Age: 70
End: 2020-02-05

## 2020-02-07 ENCOUNTER — TELEPHONE (OUTPATIENT)
Dept: INTERNAL MEDICINE | Facility: CLINIC | Age: 70
End: 2020-02-07

## 2020-02-07 NOTE — TELEPHONE ENCOUNTER
Reason for Call: Request for an order or referral:    Order or referral being requested: Ok for daily sugar free shake for patient    Date needed: as soon as possible    Has the patient been seen by the PCP for this problem? YES    Additional comments: Melissa the patients dietician called to get a verbal ok from Dr. Casey to add a sugar free mighty shake daily for the patient. She stated that the patient is agreeable. She also wanted to give him an update stating that the patients current weight is 129.5 pounds since Feb 3rd-it flags for a significat weight gain over 90 and 100 days but she is down a couple of pounds of her normal weight of 130.    Phone number Patient can be reached at:  Other phone number: [phone]    Best Time: Any    Can we leave a detailed message on this number?  YES    Call taken on 2/7/2020 at 4:03 PM by Ginger Otero

## 2020-02-11 ENCOUNTER — DOCUMENTATION ONLY (OUTPATIENT)
Dept: SLEEP MEDICINE | Facility: CLINIC | Age: 70
End: 2020-02-11

## 2020-02-11 DIAGNOSIS — G47.33 OSA (OBSTRUCTIVE SLEEP APNEA): ICD-10-CM

## 2020-02-11 DIAGNOSIS — G47.39 COMPLEX SLEEP APNEA SYNDROME: ICD-10-CM

## 2020-02-11 NOTE — PROGRESS NOTES
STM Recheck Visit    Subjective measures:  Pt states that she is using it when she takes a nap and at night to try to increase her usage.  She has no issues with the mask or pressure and is occasionally dry. She's adjusting the humidity.  Pt is feeling better when she wears it.    Assessment: Pt not meeting objective benchmarks for compliance  Patient failing following subjective benchmarks: dryness    Action plan: Patient has a follow up visit with Dr. Wright on 3/4/20  Device type: Auto-CPAP  PAP settings: CPAP min 12 cm  H20     CPAP max 15 cm  H20     90th % [id number] cm  H20    Objective measures: 14 day rolling measures         Compliance  42 %     % of night spent in large leak  10 % last  upload      AHI 6.13   last  upload      Average number of minutes 187    Diagnostic AHI: 14           Objective measure goal  Compliance   Goal >70%  Leak   Goal < 10%  AHI  Goal < 5  Usage  Goal >240

## 2020-02-12 ENCOUNTER — TELEPHONE (OUTPATIENT)
Dept: INTERNAL MEDICINE | Facility: CLINIC | Age: 70
End: 2020-02-12

## 2020-02-12 NOTE — TELEPHONE ENCOUNTER
Patient's Vesicare although refilled by me is initially given to her by urology.  Need to discuss with patient's urologist.    -Okay with every other day iron.  -Patient has osteoporosis suggest continuing with Fosamax  -Okay to discontinue nicotine patches but this was specifically requested by patient not too long ago  -Okay to decrease vitamin D to 800 units daily.  -Risperdal is not prescribed by me and needs to be discussed with the patient's primary mental health provider.  -Suggest discontinuing Carafate to see if can control reflux symptoms with only PPI.  -Okay to DC Flonase and Zyrtec    Suggested if patient lives at a site that has an in-house provider that the patient transfer to that in-house provider for ongoing care

## 2020-02-12 NOTE — TELEPHONE ENCOUNTER
Kirk calling from her facility calling. [phone]    Recommendations from their medical director after recent care meeting for pt.      Pt is on Vesicare, this medication is on the [employer] List, which means can be potentionally dangerous with long term use.  Wondering if you want to keep her on it or lower dose at all.    Pt is on iron supplements daily.  They are recommending every other day.    Would like to recommend discontinuing Flonase and Zyrtec.    Wondering if weight loss could be related to her Fosamax usage.  Should dose be decreased or med changed?    Pt is on PPI and carafate.  These can decrease iron and fosamax absorption.    Suggest weaning her off the nicotene patch.    Suggest discontinuing the lidocaine.    Sugest decrease vitamin D to 800 units daily    Suggest discontinue the Risperdal.     Please advise if you would agree with any of these recommendations.

## 2020-02-12 NOTE — TELEPHONE ENCOUNTER
Kirk advised.  They do not have on on-site physician, just medical director who has care conference intermittently with the care team there.

## 2020-02-15 ENCOUNTER — OFFICE VISIT (OUTPATIENT)
Dept: URGENT CARE | Facility: URGENT CARE | Age: 70
End: 2020-02-15
Payer: COMMERCIAL

## 2020-02-15 VITALS
TEMPERATURE: 98.5 F | SYSTOLIC BLOOD PRESSURE: 97 MMHG | OXYGEN SATURATION: 96 % | RESPIRATION RATE: 13 BRPM | HEART RATE: 80 BPM | DIASTOLIC BLOOD PRESSURE: 63 MMHG

## 2020-02-15 DIAGNOSIS — B37.0 THRUSH: ICD-10-CM

## 2020-02-15 DIAGNOSIS — J01.00 ACUTE NON-RECURRENT MAXILLARY SINUSITIS: Primary | ICD-10-CM

## 2020-02-15 PROCEDURE — 99214 OFFICE O/P EST MOD 30 MIN: CPT | Performed by: NURSE PRACTITIONER

## 2020-02-15 RX ORDER — DOXYCYCLINE 100 MG/1
100 CAPSULE ORAL 2 TIMES DAILY
Qty: 20 CAPSULE | Refills: 0 | Status: SHIPPED | OUTPATIENT
Start: 2020-02-15 | End: 2020-03-04

## 2020-02-15 RX ORDER — NYSTATIN 100000/ML
500000 SUSPENSION, ORAL (FINAL DOSE FORM) ORAL 4 TIMES DAILY
Qty: 60 ML | Refills: 1 | Status: SHIPPED | OUTPATIENT
Start: 2020-02-15 | End: 2020-04-02

## 2020-02-15 ASSESSMENT — ENCOUNTER SYMPTOMS
CHILLS: 0
HEADACHES: 1
COUGH: 1
SINUS PRESSURE: 1
APPETITE CHANGE: 1
SHORTNESS OF BREATH: 0
SORE THROAT: 1
LIGHT-HEADEDNESS: 0
RHINORRHEA: 1
MYALGIAS: 0
TROUBLE SWALLOWING: 0
FEVER: 0
SINUS PAIN: 1
WHEEZING: 0
ACTIVITY CHANGE: 1
FATIGUE: 1

## 2020-02-15 NOTE — PATIENT INSTRUCTIONS
Patient Education     Sinusitis (Antibiotic Treatment)    The sinuses are air-filled spaces within the bones of the face. They connect to the inside of the nose. Sinusitis is an inflammation of the tissue that lines the sinuses. Sinusitis can occur during a cold. It can also happen due to allergies to pollens and other particles in the air. Sinusitis can cause symptoms of sinus congestion and a feeling of fullness. A sinus infection causes fever, headache, and facial pain. There is often green or yellow fluid draining from the nose or into the back of the throat (post-nasal drip). You have been given antibiotics to treat this condition.  Home care    Take the full course of antibiotics as instructed. Do not stop taking them, even when you feel better.    Drink plenty of water, hot tea, and other liquids. This may help thin nasal mucus. It also may help your sinuses drain fluids.    Heat may help soothe painful areas of your face. Use a towel soaked in hot water. Or,  the shower and direct the warm spray onto your face. Using a vaporizer along with a menthol rub at night may also help soothe symptoms.     An expectorant with guaifenesin may help thin nasal mucus and help your sinuses drain fluids.    You can use an over-the-counter decongestant, unless a similar medicine was prescribed to you. Nasal sprays work the fastest. Use one that contains phenylephrine or oxymetazoline. First blow your nose gently. Then use the spray. Do not use these medicines more often than directed on the label. If you do, your symptoms may get worse. You may also take pills that contain pseudoephedrine. Don t use products that combine multiple medicines. This is because side effects may be increased. Read labels. You can also ask the pharmacist for help. (People with high blood pressure should not use decongestants. They can raise blood pressure.)    Over-the-counter antihistamines may help if allergies contributed to your  sinusitis.      Do not use nasal rinses or irrigation during an acute sinus infection, unless your healthcare provider tells you to. Rinsing may spread the infection to other areas in your sinuses.    Use acetaminophen or ibuprofen to control pain, unless another pain medicine was prescribed to you. If you have chronic liver or kidney disease or ever had a stomach ulcer, talk with your healthcare provider before using these medicines. (Aspirin should never be taken by anyone under age 18 who is ill with a fever. It may cause severe liver damage.)    Don't smoke. This can make symptoms worse.  Follow-up care  Follow up with your healthcare provider or our staff if you are not better in 1 week.  When to seek medical advice  Call your healthcare provider if any of these occur:    Facial pain or headache that gets worse    Stiff neck    Unusual drowsiness or confusion    Swelling of your forehead or eyelids    Vision problems, such as blurred or double vision    Fever of 100.4 F (38 C) or higher, or as directed by your healthcare provider    Seizure    Breathing problems    Symptoms don't go away in 10 days  Prevention  Here are steps you can take to help prevent an infection:    Keep good hand washing habits.    Don t have close contact with people who have sore throats, colds, or other upper respiratory infections.    Don t smoke, and stay away from secondhand smoke.    Stay up to date with of your vaccines.  Date Last Reviewed: 11/1/2017 2000-2019 [employer]. [address] [place] [address], [place] [address]. All rights reserved. This information is not intended as a substitute for professional medical care. Always follow your healthcare professional's instructions.

## 2020-02-15 NOTE — PROGRESS NOTES
SUBJECTIVE:   Sonya Foote is a 70 year old female presenting with a chief complaint of   Chief Complaint   Patient presents with     Urgent Care     sinus pressure, running nose and headaches for 2weeks.      Urgent Care     mouth pain - possible mouth thrush.        She is an established patient of [facility].    URI Adult    Onset of symptoms was 2 weeks ago.  Course of illness is worsening.    Current and Associated symptoms: as noted above and ROS  Treatment measures tried include Antihistamine and rinses mouth after inhaler/nebulizer use  Predisposing factors include Hx of thrush and recurrent sinusitis      Review of Systems   Constitutional: Positive for activity change, appetite change and fatigue. Negative for chills and fever.   HENT: Positive for congestion, mouth sores, postnasal drip, rhinorrhea, sinus pressure, sinus pain and sore throat. Negative for ear pain and trouble swallowing.    Respiratory: Positive for cough. Negative for shortness of breath and wheezing.    Musculoskeletal: Negative for myalgias.   Neurological: Positive for headaches. Negative for light-headedness.   All other systems reviewed and are negative.      Past Medical History:   Diagnosis Date     Adjustment disorder with depressed mood 9/16/2009     BPPV (benign paroxysmal positional vertigo)      Central retinal artery occlusion 2006    Left eye blindness     Chronic systolic congestive heart failure (H) 03/08/2016    EF 35-40% 2016. 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 No focal neurological deficits that are new on serial exams during hospitalization. Clinical  presentation most c/w TIA based on patient's initial report of symptoms at home prior to presentation.     TIA (transient ischemic attack) 05/2008    altered level of consciousness and increasing left-sided weakness.     Family History   Problem Relation Age of Onset     Dementia Mother      Glaucoma Mother      Diabetes Mother         may have been type 1, childhood     Coronary Artery Disease Mother         MI     Glaucoma Father      Diabetes Father         may hev been type 1 - ??     Heart Failure Father      Cancer Maternal Aunt         leukemia     Schizophrenia Brother      Depression Brother      Suicide Sister      Depression Sister      Diabetes Sister      Cancer Maternal Aunt         ovarian     Glaucoma Maternal Grandmother      Diabetes Maternal Grandmother      Glaucoma Maternal Grandfather      Diabetes Maternal Grandfather      Glaucoma Paternal Grandmother      Diabetes Paternal Grandmother      Glaucoma Paternal Grandfather      Diabetes Paternal Grandfather      Breast Cancer Sister      Cerebrovascular Disease Brother      Colon Cancer No family hx of      Macular Degeneration No family hx of      Current Outpatient Medications   Medication Sig Dispense Refill     ACETAMINOPHEN PO Take 1,000 mg by mouth every 6 hours as needed for pain Not to exceed 4000 mg/day       ADVAIR DISKUS 250-50 MCG/DOSE diskus inhaler Inhale 1 puff into the lungs 2 times daily 60 Inhaler 11     albuterol (PROVENTIL) (2.5 MG/3ML) 0.083% neb solution INHALE 1 VIAL VIA NEBULIZER EVERY 6 HOURS AS NEEDED 360 mL 11     alendronate (FOSAMAX) 70 MG tablet Take 1 tablet (70 mg) by mouth with 8oz water every 7 days 30 minutes before breakfast and remain upright during this time. 12 tablet 3     aspirin EC 81 MG EC tablet Take 1 tablet (81 mg) by mouth daily 90 tablet 3     atorvastatin (LIPITOR) 40 MG tablet TAKE 1 TAB BY MOUTH ONCE DAILY 30 tablet 11     blood glucose monitoring (ONE TOUCH ULTRA 2) meter device kit Use to test  blood sugars 3 times daily or as directed. 1 kit 0     blood glucose monitoring (ONE TOUCH ULTRA) test strip Use to test blood sugars 3 times daily or as directed. 3 Box 3     blood glucose monitoring (ONE TOUCH ULTRASOFT) lancets Use to test blood sugar 3 times daily or as directed. 100 each PRN     brimonidine (ALPHAGAN) 0.2 % ophthalmic solution Place 1 drop into the right eye 2 times daily 1 Bottle 11     capsaicin-menthol-methyl sal 0.025-1-12 % external cream Apply 1 Application topically daily as needed (topical pain) 56.6 g 1     cetirizine (ZYRTEC) 10 MG tablet Take 1 tablet (10 mg) by mouth every evening 30 tablet 3     Cholecalciferol (VITAMIN D) 2000 UNITS tablet Take 2,000 Units by mouth daily. 100 tablet 3     clotrimazole (LOTRIMIN) 1 % cream INSERT 1 APPLICATORFUL VAGINALLY TWICE A DAY FOR VAGINAL PAIN 12 g 0     clotrimazole (LOTRIMIN) 1 % external cream Apply topically 2 times daily 12 g 0     diclofenac (VOLTAREN) 1 % GEL topical gel Apply 2 g topically 4 times daily to hands 100 g 11     dorzolamide (TRUSOPT) 2 % ophthalmic solution Place 1 drop into the right eye 2 times daily 1 Bottle 11     doxycycline hyclate (VIBRAMYCIN) 100 MG capsule Take 1 capsule (100 mg) by mouth 2 times daily for 10 days 20 capsule 0     escitalopram (LEXAPRO) 10 MG tablet TAKE 1 TAB BY MOUTH ONCE DAILY 30 tablet 11     ferrous sulfate (IRON) 325 (65 FE) MG tablet Take 1 tablet (325 mg) by mouth 2 times daily With meals 60 tablet 2     fluticasone (FLONASE) 50 MCG/ACT nasal spray Spray 1 spray into both nostrils daily       lactulose (CHRONULAC) 10 GM/15ML solution Take 20 g by mouth as needed for constipation       latanoprost (XALATAN) 0.005 % ophthalmic solution Place 1 drop into both eyes At Bedtime 2.5 mL 11     levETIRAcetam (KEPPRA) 500 MG tablet TAKE 1 TAB BY MOUTH TWICE A DAY 60 tablet 5     lidocaine (LIDODERM) 5 % patch APPLY 1 PATCHES TO PAINFUL AREA AT ONCE FOR UP TO 12 HOURS WITHIN A 24 HOUR PERIOD REMOVE  AFTER 12 HOURS 30 patch 1     lisinopril (PRINIVIL/ZESTRIL) 20 MG tablet TAKE 1 TAB BY MOUTH ONCE DAILY 30 tablet 11     metFORMIN (GLUCOPHAGE) 500 MG tablet TAKE 1 TAB BY MOUTH IN THE EVENING WITH DINNER 30 tablet 5     metoprolol succinate ER (TOPROL-XL) 50 MG 24 hr tablet TAKE 1 TAB BY MOUTH ONCE DAILY 30 tablet 11     nicotine (NICODERM CQ) 14 MG/24HR 24 hr patch Place 1 patch onto the skin every 24 hours 30 patch 3     nystatin (MYCOSTATIN) 990023 UNIT/ML suspension Take 5 mLs (500,000 Units) by mouth 4 times daily - swish and spit for 7 days 60 mL 1     order for DME autoPAP 12-15 cmH20 1 Device 0     order for DME Equipment being ordered: air pressure mattress 1 Device 0     order for DME Equipment being ordered: Prosthetic legs 2 each 0     order for DME Equipment being ordered: lift recliner 1 Device 0     order for DME Equipment being ordered: Hospital Bed with side rails 1 each 0     order for DME Equipment being ordered: Power Wheel Chair 1 Device 0     order for DME Equipment being ordered: bandage tape, prefer paper 1 Package 0     order for DME Full electric hospital bed with half rails    Dx: T78147, I110, J449  Length of need: lifetime 1 Device 0     order for DME Wheel Chair Cushion: 18 x 18 inch Roho cushion 1 Device 0     order for DME Hospital bed with side rails 1 Device 0     order for DME Equipment being ordered: CPAP supplies mask, hose, filters, cushion    fax to [facility] at [phone] 10 Device prn     order for DME Equipment being ordered: Nebulizer and tubing supplies 1 Units 0     order for DME Equipment being ordered: Glucerna daily shakes with each meal 1 Box 11     ORDER FOR DME Equipment being ordered: Depends briefs 1 Month 11     pantoprazole (PROTONIX) 40 MG EC tablet TAKE 1 TAB BY MOUTH ONCE DAILY 30 tablet 11     phenytoin (DILANTIN) 100 MG capsule TAKE 2 CAPS (200MG) BY MOUTH TWICE A  capsule 11     polyethylene glycol (MIRALAX/GLYCOLAX) Packet Take 17 g by  mouth daily Dissolved in water or juice        pregabalin (LYRICA) 75 MG capsule Take 150 mg by mouth 3 times daily       risperiDONE (RISPERDAL) 0.5 MG tablet TAKE 1 TAB BY MOUTH AT BEDTIME 30 tablet 5     sennosides (SENOKOT) 8.6 MG tablet Take 1 tablet by mouth 2 times daily        solifenacin (VESICARE) 10 MG tablet TAKE 1 TAB BY MOUTH ONCE DAILY 30 tablet 11     sucralfate (CARAFATE) 1 GM tablet TAKE 1 TAB BY MOUTH FOUR TIMES DAILY. MAY DISSOLVE IN 10ML WATER ISNECESSARY 120 tablet 11     traZODone (DESYREL) 150 MG tablet TAKE 1 TAB BY MOUTH AT BEDTIME 30 tablet 11     umeclidinium (INCRUSE ELLIPTA) 62.5 MCG/INH inhaler Inhale 1 puff into the lungs daily 1 Inhaler 6     VENTOLIN  (90 Base) MCG/ACT inhaler Inhale 2 puffs into the lungs every 6 hours as needed for shortness of breath / dyspnea or wheezing 8.5 g 11     oxyCODONE (ROXICODONE) 5 MG tablet TAKE 1 TABLET BY MOUTH EVERY 6 HOURS AS NEEDED  0     Social History     Tobacco Use     Smoking status: Current Every Day Smoker     Packs/day: 0.25     Years: 30.00     Pack years: 7.50     Types: Cigarettes, Cigars     Smokeless tobacco: Never Used   Substance Use Topics     Alcohol use: No     Alcohol/week: 0.0 standard drinks       OBJECTIVE  BP 97/63   Pulse 80   Temp 98.5  F (36.9  C) (Oral)   Resp 13   SpO2 96%     Physical Exam  Constitutional:       Appearance: Normal appearance. She is not ill-appearing or diaphoretic.   HENT:      Head: Normocephalic.      Right Ear: Tympanic membrane, ear canal and external ear normal. There is no impacted cerumen.      Left Ear: Tympanic membrane, ear canal and external ear normal. There is no impacted cerumen.      Nose: Congestion and rhinorrhea present.      Comments: Nares edematous, dry   Maxillary sinus tenderness with palpation     Mouth/Throat:      Mouth: Mucous membranes are dry.      Pharynx: Oropharyngeal exudate and posterior oropharyngeal erythema present.   Neck:      Musculoskeletal: Neck  supple.   Cardiovascular:      Rate and Rhythm: Normal rate and regular rhythm.      Pulses: Normal pulses.      Heart sounds: Normal heart sounds. No murmur. No friction rub. No gallop.    Pulmonary:      Effort: Pulmonary effort is normal.      Breath sounds: Normal breath sounds. No wheezing or rhonchi.   Lymphadenopathy:      Cervical: No cervical adenopathy.   Skin:     General: Skin is warm.      Capillary Refill: Capillary refill takes less than 2 seconds.      Findings: No rash.   Neurological:      General: No focal deficit present.      Mental Status: She is alert and oriented to person, place, and time. Mental status is at baseline.   Psychiatric:         Mood and Affect: Mood normal.         Behavior: Behavior normal.         Labs:  No results found. However, due to the size of the patient record, not all encounters were searched. Please check Results Review for a complete set of results.    ASSESSMENT:    ICD-10-CM    1. Acute non-recurrent maxillary sinusitis J01.00 doxycycline hyclate (VIBRAMYCIN) 100 MG capsule   2. Thrush B37.0 nystatin (MYCOSTATIN) 470403 UNIT/ML suspension        Medical Decision Making:    Differential Diagnosis:  URI Adult/Peds:  Sinusitis, Viral upper respiratory illness and thrush    Serious Comorbid Conditions:  Adult:  Multiple, Seizure HX    PLAN: Discussed with patient course of antibiotics to be taken with food as possible and need for completion for sinusitis infection. Increase water intake. Apply heat packs to sinuses for comfort and take OTC NSAIDs as needed for pain relief, use of saline nasal spray. Oral hygiene and treatment for thrush reviewed and discussed.    Consideration for OTC allergy medications such as Allegra, Claritin or Zyrtec discussed as well. Education provided. Discussed when to return for re-evaluation of persistent symptoms. Patient agreed to the plan of care with no further questions or concerns.     Advised follow up with Pediatric PCP if  symptoms do not improve over next week. Parent agreed to the plan of care with no further questions or concerns.     VICTOR M Tillman, CNP    Patient Instructions     Patient Education     Sinusitis (Antibiotic Treatment)    The sinuses are air-filled spaces within the bones of the face. They connect to the inside of the nose. Sinusitis is an inflammation of the tissue that lines the sinuses. Sinusitis can occur during a cold. It can also happen due to allergies to pollens and other particles in the air. Sinusitis can cause symptoms of sinus congestion and a feeling of fullness. A sinus infection causes fever, headache, and facial pain. There is often green or yellow fluid draining from the nose or into the back of the throat (post-nasal drip). You have been given antibiotics to treat this condition.  Home care    Take the full course of antibiotics as instructed. Do not stop taking them, even when you feel better.    Drink plenty of water, hot tea, and other liquids. This may help thin nasal mucus. It also may help your sinuses drain fluids.    Heat may help soothe painful areas of your face. Use a towel soaked in hot water. Or,  the shower and direct the warm spray onto your face. Using a vaporizer along with a menthol rub at night may also help soothe symptoms.     An expectorant with guaifenesin may help thin nasal mucus and help your sinuses drain fluids.    You can use an over-the-counter decongestant, unless a similar medicine was prescribed to you. Nasal sprays work the fastest. Use one that contains phenylephrine or oxymetazoline. First blow your nose gently. Then use the spray. Do not use these medicines more often than directed on the label. If you do, your symptoms may get worse. You may also take pills that contain pseudoephedrine. Don t use products that combine multiple medicines. This is because side effects may be increased. Read labels. You can also ask the pharmacist for help.  (People with high blood pressure should not use decongestants. They can raise blood pressure.)    Over-the-counter antihistamines may help if allergies contributed to your sinusitis.      Do not use nasal rinses or irrigation during an acute sinus infection, unless your healthcare provider tells you to. Rinsing may spread the infection to other areas in your sinuses.    Use acetaminophen or ibuprofen to control pain, unless another pain medicine was prescribed to you. If you have chronic liver or kidney disease or ever had a stomach ulcer, talk with your healthcare provider before using these medicines. (Aspirin should never be taken by anyone under age 18 who is ill with a fever. It may cause severe liver damage.)    Don't smoke. This can make symptoms worse.  Follow-up care  Follow up with your healthcare provider or our staff if you are not better in 1 week.  When to seek medical advice  Call your healthcare provider if any of these occur:    Facial pain or headache that gets worse    Stiff neck    Unusual drowsiness or confusion    Swelling of your forehead or eyelids    Vision problems, such as blurred or double vision    Fever of 100.4 F (38 C) or higher, or as directed by your healthcare provider    Seizure    Breathing problems    Symptoms don't go away in 10 days  Prevention  Here are steps you can take to help prevent an infection:    Keep good hand washing habits.    Don t have close contact with people who have sore throats, colds, or other upper respiratory infections.    Don t smoke, and stay away from secondhand smoke.    Stay up to date with of your vaccines.  Date Last Reviewed: 11/1/2017 2000-2019 [employer]. [address] [place] [address], [place] [address]. All rights reserved. This information is not intended as a substitute for professional medical care. Always follow your healthcare professional's instructions.

## 2020-02-25 ENCOUNTER — MEDICAL CORRESPONDENCE (OUTPATIENT)
Dept: HEALTH INFORMATION MANAGEMENT | Facility: CLINIC | Age: 70
End: 2020-02-25

## 2020-03-04 ENCOUNTER — TELEPHONE (OUTPATIENT)
Dept: INTERNAL MEDICINE | Facility: CLINIC | Age: 70
End: 2020-03-04

## 2020-03-04 ENCOUNTER — OFFICE VISIT (OUTPATIENT)
Dept: SLEEP MEDICINE | Facility: CLINIC | Age: 70
End: 2020-03-04
Payer: COMMERCIAL

## 2020-03-04 ENCOUNTER — OFFICE VISIT (OUTPATIENT)
Dept: FAMILY MEDICINE | Facility: CLINIC | Age: 70
End: 2020-03-04
Payer: COMMERCIAL

## 2020-03-04 VITALS
HEART RATE: 69 BPM | BODY MASS INDEX: 47.53 KG/M2 | SYSTOLIC BLOOD PRESSURE: 138 MMHG | WEIGHT: 125 LBS | OXYGEN SATURATION: 98 % | DIASTOLIC BLOOD PRESSURE: 77 MMHG

## 2020-03-04 VITALS
SYSTOLIC BLOOD PRESSURE: 124 MMHG | HEART RATE: 75 BPM | DIASTOLIC BLOOD PRESSURE: 72 MMHG | BODY MASS INDEX: 47.53 KG/M2 | TEMPERATURE: 98 F | OXYGEN SATURATION: 95 % | HEIGHT: 55 IN

## 2020-03-04 DIAGNOSIS — G47.39 COMPLEX SLEEP APNEA SYNDROME: Primary | ICD-10-CM

## 2020-03-04 DIAGNOSIS — J01.90 ACUTE NON-RECURRENT SINUSITIS, UNSPECIFIED LOCATION: Primary | ICD-10-CM

## 2020-03-04 DIAGNOSIS — G47.33 OSA (OBSTRUCTIVE SLEEP APNEA): ICD-10-CM

## 2020-03-04 DIAGNOSIS — J30.9 ALLERGIC RHINITIS, UNSPECIFIED SEASONALITY, UNSPECIFIED TRIGGER: ICD-10-CM

## 2020-03-04 PROCEDURE — 99213 OFFICE O/P EST LOW 20 MIN: CPT | Performed by: NURSE PRACTITIONER

## 2020-03-04 PROCEDURE — 99214 OFFICE O/P EST MOD 30 MIN: CPT | Performed by: INTERNAL MEDICINE

## 2020-03-04 RX ORDER — AZITHROMYCIN 250 MG/1
TABLET, FILM COATED ORAL
Qty: 6 TABLET | Refills: 0 | Status: SHIPPED | OUTPATIENT
Start: 2020-03-04 | End: 2020-03-12

## 2020-03-04 RX ORDER — LORATADINE 10 MG/1
10 TABLET ORAL DAILY
Qty: 30 TABLET | Refills: 1 | Status: SHIPPED | OUTPATIENT
Start: 2020-03-04 | End: 2020-07-30

## 2020-03-04 ASSESSMENT — PAIN SCALES - GENERAL: PAINLEVEL: EXTREME PAIN (8)

## 2020-03-04 NOTE — PROGRESS NOTES
"Subjective     Sonya Foote is a 70 year old female who presents to clinic today for the following health issues:    HPI   ENT Symptoms             Symptoms: cc Present Absent Comment   Fever/Chills  x  Low grade   Fatigue  x     Muscle Aches  x     Eye Irritation  x     Sneezing  x     Nasal Phong/Drg  x     Sinus Pressure/Pain  x     Loss of smell   x    Dental pain   x    Sore Throat  x     Swollen Glands   x    Ear Pain/Fullness  x     Cough  x  Yellow Mucus     Wheeze  x     Chest Pain  x     Shortness of breath  x     Rash   x    Other         Symptom duration:  about 2 weeks   Symptom severity:  moderate   Treatments tried:  Mucinex   Contacts:  none       \"my nose is a waterfall since stopped Zyrtec\" - states stopped at [facility] home because she's also on fluticasone (Flonase)  Productive cough, some shortness of breath, headaches, ears hurts  Face feels puffy  On doxycycline without relief    Patient Active Problem List   Diagnosis     Seizure disorder (H)     Osteoporosis     Schizoaffective disorder (H)     AS (sickle cell trait) (H)     Vertigo     Person who has had sex change operation     Intestinal malabsorption     Chronic neck pain     Chronic pain syndrome     Hx of colonic polyp     Iron deficiency anemia     Degeneration of intervertebral disc of lumbosacral region     Thoracic or lumbosacral neuritis or radiculitis     Androgen insensitivity syndrome     PAD (peripheral artery disease) (H)     Stenosis of carotid artery     Osteoarthritis     Essential hypertension     Type 2 diabetes mellitus with diabetic peripheral angiopathy without gangrene, without long-term current use of insulin (H)     Anxiety disorder     Lumbosacral radiculitis     Peripheral neuropathy     Primary open angle glaucoma of both eyes, severe stage     Pseudophakia of right eye     Cataract, left eye     Diabetes mellitus type 2 without retinopathy (H)     Simple chronic bronchitis (H)     JOSE (obstructive sleep apnea)-      " Complex sleep apnea syndrome     Coronary artery disease involving native coronary artery of native heart     Bee sting reaction, undetermined intent, subsequent encounter     Functional incontinence     History of recurrent UTIs     Dysphagia     Incontinence     Migraine headache     History of CVA (cerebrovascular accident)     Tobacco abuse     S/P AKA (above knee amputation) bilateral (H)     Chronic, continuous use of opioids     Other neuromuscular dysfunction of bladder     Hyperlipidemia LDL goal <100     Blind left eye     Chronic back pain     Blind right eye     Polypharmacy     Esophageal reflux     Suprapubic catheter (H)     Neurogenic bladder     Past Surgical History:   Procedure Laterality Date     AMPUTATE LEG ABOVE KNEE Left 6/11/2016    Procedure: AMPUTATE LEG ABOVE KNEE;  Surgeon: Mello Rodriguez MD;  [facility]     AMPUTATE LEG BELOW KNEE Right 11/7/2016    Procedure: AMPUTATE LEG BELOW KNEE;  Surgeon: Savannah Durant MD;  [facility]     AMPUTATE REVISION STUMP LOWER EXTREMITY Right 11/11/2016    Procedure: AMPUTATE REVISION STUMP LOWER EXTREMITY;  Surgeon: Savannah Durant MD;  [facility]     AMPUTATE REVISION STUMP LOWER EXTREMITY Right 11/16/2016    Procedure: AMPUTATE REVISION STUMP LOWER EXTREMITY;  Surgeon: Savannah Durant MD;  [facility]     AMPUTATE TOE(S) Right 1/5/2016    Procedure: AMPUTATE TOE(S);  Surgeon: Mello Gaines DPM;  Location:  [facility]     ANGIOGRAM Bilateral 11/21/2014    Procedure: ANGIOGRAM;  Surgeon: Savannah Durant MD;  [facility]     ANGIOGRAM Left 1/16/2015    Procedure: ANGIOGRAM;  Surgeon: Savannah Durant MD;  [facility]     ANGIOGRAM Bilateral 9/14/2015    Procedure: ANGIOGRAM;  Surgeon: Savannah Durant MD;  [facility]     ANGIOGRAM Left 10/12/2015    Procedure: ANGIOGRAM;  Surgeon: Savannah Durant MD;  [facility]     ANGIOGRAM Right 6/6/2016    Procedure: ANGIOGRAM;  Surgeon: Savannah Durant MD;  [facility]     ANGIOPLASTY  Right 6/6/2016    Procedure: ANGIOPLASTY;  Surgeon: Savannah Durant MD;  [facility]     APPENDECTOMY       BREAST SURGERY      right breast bx (benign)     CATARACT IOL, RT/LT Right      CHOLECYSTECTOMY       COLONOSCOPY N/A 8/25/2014    Procedure: COLONOSCOPY;  Surgeon: Mello Ferrer MD;  [facility]     COLONOSCOPY WITH CO2 INSUFFLATION N/A 8/20/2014    Procedure: COLONOSCOPY WITH CO2 INSUFFLATION;  Surgeon: Duane, William Charles, MD;  [facility]     CYSTOSCOPY, INTRAVESICAL INJECTION N/A 10/31/2019    Procedure: CYSTOSCOPY, BOTOX INJECTION, Suprapubic Catheter Exchange;  Surgeon: Loki Gordon MD;  [facility]     CYSTOSTOMY, INSERT TUBE SUPRAPUBIC, COMBINED N/A 1/16/2018    Procedure: COMBINED CYSTOSTOMY, INSERT TUBE SUPRAPUBIC;  Cystoscopy, Intraoperative Ultrasound, Suprapubic Tube Placement;  Surgeon: Keanu Dawson MD;  [facility]     ENDARTERECTOMY FEMORAL  5/23/2014    Procedure: ENDARTERECTOMY FEMORAL;  Surgeon: Jason Joshi MD;  [facility]     ESOPHAGOSCOPY, GASTROSCOPY, DUODENOSCOPY (EGD), COMBINED  12/14/2012    Procedure: COMBINED ESOPHAGOSCOPY, GASTROSCOPY, DUODENOSCOPY (EGD), BIOPSY SINGLE OR MULTIPLE;  ESOPHAGOSCOPY, GASTROSCOPY, DUODENOSCOPY (EGD), DILATATION ;  Surgeon: Elizabeth Stevenson MD;  Location:  [facility]     ESOPHAGOSCOPY, GASTROSCOPY, DUODENOSCOPY (EGD), COMBINED  12/31/2013    Procedure: COMBINED ESOPHAGOSCOPY, GASTROSCOPY, DUODENOSCOPY (EGD), BIOPSY SINGLE OR MULTIPLE;;  Surgeon: Clemente Lopez MD;  [facility]     ESOPHAGOSCOPY, GASTROSCOPY, DUODENOSCOPY (EGD), COMBINED  4/1/2014    Procedure: COMBINED ESOPHAGOSCOPY, GASTROSCOPY, DUODENOSCOPY (EGD);;  Surgeon: Clemente Lopez MD;  [facility]     ESOPHAGOSCOPY, GASTROSCOPY, DUODENOSCOPY (EGD), COMBINED  6/28/2014    Procedure: COMBINED ESOPHAGOSCOPY, GASTROSCOPY, DUODENOSCOPY (EGD);  Surgeon: Clemente Lopez MD;  [facility]     ESOPHAGOSCOPY, GASTROSCOPY,  DUODENOSCOPY (EGD), COMBINED N/A 8/20/2014    Procedure: COMBINED ESOPHAGOSCOPY, GASTROSCOPY, DUODENOSCOPY (EGD), BIOPSY SINGLE OR MULTIPLE;  Surgeon: Duane, William Charles, MD;  Location:  OR     ESOPHAGOSCOPY, GASTROSCOPY, DUODENOSCOPY (EGD), COMBINED N/A 8/22/2014    Procedure: COMBINED ESOPHAGOSCOPY, GASTROSCOPY, DUODENOSCOPY (EGD), BIOPSY SINGLE OR MULTIPLE;  Surgeon: Mello Ferrer MD;  [facility]     ESOPHAGOSCOPY, GASTROSCOPY, DUODENOSCOPY (EGD), COMBINED N/A 10/2/2014    Procedure: COMBINED ESOPHAGOSCOPY, GASTROSCOPY, DUODENOSCOPY (EGD), BIOPSY SINGLE OR MULTIPLE;  Surgeon: Remy Haskins MD;  Location:  [facility]     ESOPHAGOSCOPY, GASTROSCOPY, DUODENOSCOPY (EGD), COMBINED Left 12/15/2014    Procedure: COMBINED ESOPHAGOSCOPY, GASTROSCOPY, DUODENOSCOPY (EGD), BIOPSY SINGLE OR MULTIPLE;  Surgeon: Remy Haskins MD;  [facility]     ESOPHAGOSCOPY, GASTROSCOPY, DUODENOSCOPY (EGD), COMBINED N/A 2/25/2015    Procedure: COMBINED ENDOSCOPIC ULTRASOUND, ESOPHAGOSCOPY, GASTROSCOPY, DUODENOSCOPY (EGD), FINE NEEDLE ASPIRATE/BIOPSY;  Surgeon: Clemente Lugo MD;  Location:  [facility]     ESOPHAGOSCOPY, GASTROSCOPY, DUODENOSCOPY (EGD), COMBINED Left 2/25/2015    Procedure: COMBINED ESOPHAGOSCOPY, GASTROSCOPY, DUODENOSCOPY (EGD), BIOPSY SINGLE OR MULTIPLE;  Surgeon: Clemente Lugo MD;  Location:  [facility]     ESOPHAGOSCOPY, GASTROSCOPY, DUODENOSCOPY (EGD), COMBINED N/A 9/25/2016    Procedure: COMBINED ESOPHAGOSCOPY, GASTROSCOPY, DUODENOSCOPY (EGD);  Surgeon: Aziza Patiño MD;  Location:  [facility]     ESOPHAGOSCOPY, GASTROSCOPY, DUODENOSCOPY (EGD), COMBINED N/A 1/18/2017    Procedure: COMBINED ESOPHAGOSCOPY, GASTROSCOPY, DUODENOSCOPY (EGD), BIOPSY SINGLE OR MULTIPLE;  Surgeon: Clemente Lopez MD;  Location:  [facility]     ESOPHAGOSCOPY, GASTROSCOPY, DUODENOSCOPY (EGD), COMBINED N/A 11/26/2017    Procedure: COMBINED ESOPHAGOSCOPY, GASTROSCOPY, DUODENOSCOPY (EGD), REMOVE FOREIGN BODY;   Esophagogastroduodenoscopy with foreign body extraction  ;  Surgeon: Herberth Castrejon MD;  [facility]     ESOPHAGOSCOPY, GASTROSCOPY, DUODENOSCOPY (EGD), COMBINED N/A 11/26/2017    Procedure: COMBINED ESOPHAGOSCOPY, GASTROSCOPY, DUODENOSCOPY (EGD), REMOVE FOREIGN BODY;;  Surgeon: Herberth Castrejon MD;  [facility]     FASCIOTOMY LOWER EXTREMITY Left 6/10/2016    Procedure: FASCIOTOMY LOWER EXTREMITY;  Surgeon: Mello Rodriguez MD;  [facility]     HC CAPSULE ENDOSCOPY N/A 8/25/2014    Procedure: CAPSULE/PILL CAM ENDOSCOPY;  Surgeon: Remy Haskins MD;  [facility]     HC CAPSULE ENDOSCOPY N/A 10/2/2014    Procedure: CAPSULE/PILL CAM ENDOSCOPY;  Surgeon: Remy Haskins MD;  [facility]     ORTHOPEDIC SURGERY      broken wrist repair     SEX TRANSFORMATION SURGERY, MALE TO FEMALE  1974 1974     SINUS SURGERY      cyst removed     TONSILLECTOMY       VASCULAR SURGERY  2006    Left carotid stent       Social History     Tobacco Use     Smoking status: Current Every Day Smoker     Packs/day: 0.25     Years: 30.00     Pack years: 7.50     Types: Cigarettes, Cigars     Smokeless tobacco: Never Used   Substance Use Topics     Alcohol use: No     Alcohol/week: 0.0 standard drinks     Family History   Problem Relation Age of Onset     Dementia Mother      Glaucoma Mother      Diabetes Mother         may have been type 1, childhood     Coronary Artery Disease Mother         MI     Glaucoma Father      Diabetes Father         may hev been type 1 - ??     Heart Failure Father      Cancer Maternal Aunt         leukemia     Schizophrenia Brother      Depression Brother      Suicide Sister      Depression Sister      Diabetes Sister      Cancer Maternal Aunt         ovarian     Glaucoma Maternal Grandmother      Diabetes Maternal Grandmother      Glaucoma Maternal Grandfather      Diabetes Maternal Grandfather      Glaucoma Paternal Grandmother      Diabetes Paternal Grandmother       Glaucoma Paternal Grandfather      Diabetes Paternal Grandfather      Breast Cancer Sister      Cerebrovascular Disease Brother      Colon Cancer No family hx of      Macular Degeneration No family hx of          Current Outpatient Medications   Medication Sig Dispense Refill     ACETAMINOPHEN PO Take 1,000 mg by mouth every 6 hours as needed for pain Not to exceed 4000 mg/day       ADVAIR DISKUS 250-50 MCG/DOSE diskus inhaler Inhale 1 puff into the lungs 2 times daily 60 Inhaler 11     albuterol (PROVENTIL) (2.5 MG/3ML) 0.083% neb solution INHALE 1 VIAL VIA NEBULIZER EVERY 6 HOURS AS NEEDED 360 mL 11     alendronate (FOSAMAX) 70 MG tablet Take 1 tablet (70 mg) by mouth with 8oz water every 7 days 30 minutes before breakfast and remain upright during this time. 12 tablet 3     aspirin EC 81 MG EC tablet Take 1 tablet (81 mg) by mouth daily 90 tablet 3     atorvastatin (LIPITOR) 40 MG tablet TAKE 1 TAB BY MOUTH ONCE DAILY 30 tablet 11     azithromycin (ZITHROMAX) 250 MG tablet Take 2 tablets (500 mg) by mouth daily for 1 day, THEN 1 tablet (250 mg) daily for 4 days. 6 tablet 0     blood glucose monitoring (ONE TOUCH ULTRA 2) meter device kit Use to test blood sugars 3 times daily or as directed. 1 kit 0     blood glucose monitoring (ONE TOUCH ULTRA) test strip Use to test blood sugars 3 times daily or as directed. 3 Box 3     blood glucose monitoring (ONE TOUCH ULTRASOFT) lancets Use to test blood sugar 3 times daily or as directed. 100 each PRN     brimonidine (ALPHAGAN) 0.2 % ophthalmic solution Place 1 drop into the right eye 2 times daily 1 Bottle 11     capsaicin-menthol-methyl sal 0.025-1-12 % external cream Apply 1 Application topically daily as needed (topical pain) 56.6 g 1     Cholecalciferol (VITAMIN D) 2000 UNITS tablet Take 2,000 Units by mouth daily. 100 tablet 3     clotrimazole (LOTRIMIN) 1 % cream INSERT 1 APPLICATORFUL VAGINALLY TWICE A DAY FOR VAGINAL PAIN 12 g 0     clotrimazole (LOTRIMIN) 1 %  external cream Apply topically 2 times daily 12 g 0     diclofenac (VOLTAREN) 1 % GEL topical gel Apply 2 g topically 4 times daily to hands 100 g 11     dorzolamide (TRUSOPT) 2 % ophthalmic solution Place 1 drop into the right eye 2 times daily 1 Bottle 11     escitalopram (LEXAPRO) 10 MG tablet TAKE 1 TAB BY MOUTH ONCE DAILY 30 tablet 11     ferrous sulfate (IRON) 325 (65 FE) MG tablet Take 1 tablet (325 mg) by mouth 2 times daily With meals 60 tablet 2     fluticasone (FLONASE) 50 MCG/ACT nasal spray Spray 1 spray into both nostrils daily       lactulose (CHRONULAC) 10 GM/15ML solution Take 20 g by mouth as needed for constipation       latanoprost (XALATAN) 0.005 % ophthalmic solution Place 1 drop into both eyes At Bedtime 2.5 mL 11     levETIRAcetam (KEPPRA) 500 MG tablet TAKE 1 TAB BY MOUTH TWICE A DAY 60 tablet 5     lidocaine (LIDODERM) 5 % patch APPLY 1 PATCHES TO PAINFUL AREA AT ONCE FOR UP TO 12 HOURS WITHIN A 24 HOUR PERIOD REMOVE AFTER 12 HOURS 30 patch 1     lisinopril (PRINIVIL/ZESTRIL) 20 MG tablet TAKE 1 TAB BY MOUTH ONCE DAILY 30 tablet 11     loratadine (CLARITIN) 10 MG tablet Take 1 tablet (10 mg) by mouth daily 30 tablet 1     metFORMIN (GLUCOPHAGE) 500 MG tablet TAKE 1 TAB BY MOUTH IN THE EVENING WITH DINNER 30 tablet 5     metoprolol succinate ER (TOPROL-XL) 50 MG 24 hr tablet TAKE 1 TAB BY MOUTH ONCE DAILY 30 tablet 11     nicotine (NICODERM CQ) 14 MG/24HR 24 hr patch Place 1 patch onto the skin every 24 hours 30 patch 3     nystatin (MYCOSTATIN) 952808 UNIT/ML suspension Take 5 mLs (500,000 Units) by mouth 4 times daily - swish and spit for 7 days 60 mL 1     order for DME autoPAP 12-15 cmH20 1 Device 0     order for DME Equipment being ordered: air pressure mattress 1 Device 0     order for DME Equipment being ordered: Prosthetic legs 2 each 0     order for DME Equipment being ordered: lift recliner 1 Device 0     order for DME Equipment being ordered: Power Wheel Chair 1 Device 0     order  for DME Equipment being ordered: bandage tape, prefer paper 1 Package 0     order for DME Full electric hospital bed with half rails    Dx: A65201, I110, J449  Length of need: lifetime 1 Device 0     order for DME Wheel Chair Cushion: 18 x 18 inch Roho cushion 1 Device 0     order for DME Hospital bed with side rails 1 Device 0     order for DME Equipment being ordered: Nebulizer and tubing supplies 1 Units 0     order for DME Equipment being ordered: Glucerna daily shakes with each meal 1 Box 11     oxyCODONE (ROXICODONE) 5 MG tablet TAKE 1 TABLET BY MOUTH EVERY 6 HOURS AS NEEDED  0     pantoprazole (PROTONIX) 40 MG EC tablet TAKE 1 TAB BY MOUTH ONCE DAILY 30 tablet 11     phenytoin (DILANTIN) 100 MG capsule TAKE 2 CAPS (200MG) BY MOUTH TWICE A  capsule 11     polyethylene glycol (MIRALAX/GLYCOLAX) Packet Take 17 g by mouth daily Dissolved in water or juice        pregabalin (LYRICA) 75 MG capsule Take 150 mg by mouth 3 times daily       risperiDONE (RISPERDAL) 0.5 MG tablet TAKE 1 TAB BY MOUTH AT BEDTIME 30 tablet 5     sennosides (SENOKOT) 8.6 MG tablet Take 1 tablet by mouth 2 times daily        solifenacin (VESICARE) 10 MG tablet TAKE 1 TAB BY MOUTH ONCE DAILY 30 tablet 11     sucralfate (CARAFATE) 1 GM tablet TAKE 1 TAB BY MOUTH FOUR TIMES DAILY. MAY DISSOLVE IN 10ML WATER ISNECESSARY 120 tablet 11     traZODone (DESYREL) 150 MG tablet TAKE 1 TAB BY MOUTH AT BEDTIME 30 tablet 11     umeclidinium (INCRUSE ELLIPTA) 62.5 MCG/INH inhaler Inhale 1 puff into the lungs daily 1 Inhaler 6     VENTOLIN  (90 Base) MCG/ACT inhaler Inhale 2 puffs into the lungs every 6 hours as needed for shortness of breath / dyspnea or wheezing 8.5 g 11     Allergies   Allergen Reactions     Bee Venom      Penicillins Anaphylaxis     Other reaction(s): Skin Rash and/or Hives. Tolerated cefepime in 12/2016     Dilantin [Phenytoin] Other (See Comments)     Generic dilantin only per pt     Iodide Hives     09/11/15: Pt states  "she can use iodine and doesn't have any problems      Iodine-131      Novocaine [Procaine] Hives     Other reaction(s): Skin Rash and/or Hives     Tositumomab          Reviewed and updated as needed this visit by Provider         Review of Systems   ROS COMP: Constitutional, HEENT, cardiovascular, pulmonary, gi and gu systems are negative, except as otherwise noted.      Objective    /72 (BP Location: Left arm, Patient Position: Chair, Cuff Size: Adult Large)   Pulse 75   Temp 98  F (36.7  C) (Oral)   Ht 1.092 m (3' 7\")   SpO2 95%   BMI 47.53 kg/m    Body mass index is 47.53 kg/m .  Physical Exam   GENERAL: healthy, alert and no distress  EYES: Eyes grossly normal to inspection, PERRL and conjunctivae and sclerae normal  HENT: ear canals and TM's normal, nose and mouth without ulcers or lesions  NECK: no adenopathy  RESP: lungs clear to auscultation - no rales, rhonchi or wheezes  CV: regular rate and rhythm  PSYCH: mentation appears normal, affect normal/bright    Diagnostic Test Results:  Labs reviewed in Epic        Assessment & Plan     1. Acute non-recurrent sinusitis, unspecified location  Discussed supportive interventions. Likely related to stopping antihistamine. Will cover for sinus infection now but should resume antihistamine going forward.  - azithromycin (ZITHROMAX) 250 MG tablet; Take 2 tablets (500 mg) by mouth daily for 1 day, THEN 1 tablet (250 mg) daily for 4 days.  Dispense: 6 tablet; Refill: 0    2. Allergic rhinitis, unspecified seasonality, unspecified trigger  Symptoms worsened since off antihistamine. Continue fluticasone (Flonase).  - loratadine (CLARITIN) 10 MG tablet; Take 1 tablet (10 mg) by mouth daily  Dispense: 30 tablet; Refill: 1       See Patient Instructions    Continue fluticasone (Flonase) 2 sprays each nostril daily x3 weeks  Start loratidine (Claritin) 10 mg daily x30 days  Start azithromycin for current infection  Follow up with primary care provider in 1-2 weeks " as needed    Return in about 2 weeks (around 3/18/2020).     The benefits, risks and potential side effects were discussed in detail. Black box warnings discussed as relevant. All patient questions were answered. The patient was instructed to follow up immediately if any adverse reactions develop.    Return precautions discussed, including when to seek urgent/emergent care.    Patient verbalizes understanding and agrees with plan of care. Patient stable for discharge.      VICTOR M Regalado [facility]

## 2020-03-04 NOTE — PROGRESS NOTES
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 Did have Restless Legs Syndrome before amputation but not anymore.     Sleep history:   10/26/2012 - Polysomnography at [facility] -  min; AHI 14; RDI 22; ERIN 5/hr; No PLMs or RBD.    11/16/2012 - Titration Polysomnography at [facility] - Started on CPAP and developed treatment-emergent central apneas. Adaptive Servo-Ventilation titration optimal but did well on CPAP 8 as well (at least REM supine per comments).     Put on CPAP 8 cmH2O.     Initially presented to [facility] 2017 on CPAP 8 cmH20 and elevated AHI 9.7 on download. Not a candidate for ASV due to reduced ejection fraction.     2/26/2017 - Titration Polysomnography at the [facility] (130#)  minutes. CPAP 5 - 12 cmH2O tried. Unacceptable titration due to high residual AHI, however, oxygen desaturations and events were better controlled on CPAP of 10 cmH2O or higher. However AHI was >20 and predominantly obstructive events were scored.     She was changed to CPAP 12 cmH2O.    She received a new machine 7/2019 and lost it due to non-compliance    Echo 11/2019  Global and regional left ventricular function is normal with an EF of 60-65%.  Pulmonary artery systolic pressure is normal.    Study Date: 11/20/2019 (130.0 lbs) Titrated CPAP 5 to 12 cmH2O. Supine REM was seen at 11 cmH20 with fair control of events, but persistent RERAs, snoring, and airflow limitation. Events often appeared periodic, and cheyyne-fang morphology was suggested at times, but periodicity was not well consolidated and circulation times were not delayed.     Recommend treatment of JOSE with Autotitrating  PAP therapy with a range of 12 cmH2O to 15 cmH2O       Overall, she rates the experience with PAP as 5 (0 poor, 10 great). The mask is comfortable.  The mask is leaking. She is having significant oral/nasal dryness. The pressure is comfortable.     Her PAP interface is Nasal Mask.    Bedtime is typically 2230. Usually it takes about 60 min minutes to fall asleep with the mask on. Wake time is typically 0500.  Patient is using PAP therapy 4.5 hours per night. The patient is usually getting 4-5 hours of sleep per night.    She feels she was 'not getting enough air', feeling shortness of breath when she wears CPAP. She also feels that when when she does not use PAP    She does not feel rested in the morning.    Total score - [place]: 9 (3/4/2020  9:00 AM)    Respironics  Auto-PAP 12 - 15 cmH2O 30 day usage data:    50% of days with > 4 hours of use. 0/30 days with no use.   Average use 246 minutes per day.   Average leak 52.57 LPM.  Average % of night in large leak 14%.    CPAP 90% pressure 14cm.   AHI 6.87 events per hour.  PB 0.6%  ERIN 2.6    She also uses PAP for afternoon naps of 1-2 hours  She takes CPAP off at night, mostly when she is not sleeping because of pain    She is a tangential and vague historian    She has had increased problems with nasal drainage, like a water faucet. This has been a problem her whole life, but worsened past week with associated congestion, headache, purulent drainage and difficulty breathing  She reports prior to that she also had bronchitis  When she is ill it is harder to use CPAP    These findings were reviewed with patient.     Past medical/surgical history, family history, social history, medications and allergies were reviewed.      Current Outpatient Medications   Medication Sig Dispense Refill     ACETAMINOPHEN PO Take 1,000 mg by mouth every 6 hours as needed for pain Not to exceed 4000 mg/day       ADVAIR DISKUS 250-50 MCG/DOSE diskus inhaler Inhale 1 puff into the lungs 2  times daily 60 Inhaler 11     albuterol (PROVENTIL) (2.5 MG/3ML) 0.083% neb solution INHALE 1 VIAL VIA NEBULIZER EVERY 6 HOURS AS NEEDED 360 mL 11     aspirin EC 81 MG EC tablet Take 1 tablet (81 mg) by mouth daily 90 tablet 3     atorvastatin (LIPITOR) 40 MG tablet TAKE 1 TAB BY MOUTH ONCE DAILY 30 tablet 11     brimonidine (ALPHAGAN) 0.2 % ophthalmic solution Place 1 drop into the right eye 2 times daily 1 Bottle 11     capsaicin-menthol-methyl sal 0.025-1-12 % external cream Apply 1 Application topically daily as needed (topical pain) 56.6 g 1     Cholecalciferol (VITAMIN D) 2000 UNITS tablet Take 2,000 Units by mouth daily. 100 tablet 3     dorzolamide (TRUSOPT) 2 % ophthalmic solution Place 1 drop into the right eye 2 times daily 1 Bottle 11     escitalopram (LEXAPRO) 10 MG tablet TAKE 1 TAB BY MOUTH ONCE DAILY 30 tablet 11     ferrous sulfate (IRON) 325 (65 FE) MG tablet Take 1 tablet (325 mg) by mouth 2 times daily With meals 60 tablet 2     fluticasone (FLONASE) 50 MCG/ACT nasal spray Spray 1 spray into both nostrils daily       lactulose (CHRONULAC) 10 GM/15ML solution Take 20 g by mouth as needed for constipation       levETIRAcetam (KEPPRA) 500 MG tablet TAKE 1 TAB BY MOUTH TWICE A DAY 60 tablet 5     lidocaine (LIDODERM) 5 % patch APPLY 1 PATCHES TO PAINFUL AREA AT ONCE FOR UP TO 12 HOURS WITHIN A 24 HOUR PERIOD REMOVE AFTER 12 HOURS 30 patch 1     lisinopril (PRINIVIL/ZESTRIL) 20 MG tablet TAKE 1 TAB BY MOUTH ONCE DAILY 30 tablet 11     metFORMIN (GLUCOPHAGE) 500 MG tablet TAKE 1 TAB BY MOUTH IN THE EVENING WITH DINNER 30 tablet 5     metoprolol succinate ER (TOPROL-XL) 50 MG 24 hr tablet TAKE 1 TAB BY MOUTH ONCE DAILY 30 tablet 11     nicotine (NICODERM CQ) 14 MG/24HR 24 hr patch Place 1 patch onto the skin every 24 hours 30 patch 3     pantoprazole (PROTONIX) 40 MG EC tablet TAKE 1 TAB BY MOUTH ONCE DAILY 30 tablet 11     phenytoin (DILANTIN) 100 MG capsule TAKE 2 CAPS (200MG) BY MOUTH TWICE A   capsule 11     polyethylene glycol (MIRALAX/GLYCOLAX) Packet Take 17 g by mouth daily Dissolved in water or juice        pregabalin (LYRICA) 75 MG capsule Take 150 mg by mouth 3 times daily       risperiDONE (RISPERDAL) 0.5 MG tablet TAKE 1 TAB BY MOUTH AT BEDTIME 30 tablet 5     sennosides (SENOKOT) 8.6 MG tablet Take 1 tablet by mouth 2 times daily        solifenacin (VESICARE) 10 MG tablet TAKE 1 TAB BY MOUTH ONCE DAILY 30 tablet 11     traZODone (DESYREL) 150 MG tablet TAKE 1 TAB BY MOUTH AT BEDTIME 30 tablet 11     umeclidinium (INCRUSE ELLIPTA) 62.5 MCG/INH inhaler Inhale 1 puff into the lungs daily 1 Inhaler 6     alendronate (FOSAMAX) 70 MG tablet Take 1 tablet (70 mg) by mouth with 8oz water every 7 days 30 minutes before breakfast and remain upright during this time. 12 tablet 3     blood glucose monitoring (ONE TOUCH ULTRA 2) meter device kit Use to test blood sugars 3 times daily or as directed. (Patient not taking: Reported on 3/4/2020) 1 kit 0     blood glucose monitoring (ONE TOUCH ULTRA) test strip Use to test blood sugars 3 times daily or as directed. (Patient not taking: Reported on 3/4/2020) 3 Box 3     blood glucose monitoring (ONE TOUCH ULTRASOFT) lancets Use to test blood sugar 3 times daily or as directed. (Patient not taking: Reported on 3/4/2020) 100 each PRN     cetirizine (ZYRTEC) 10 MG tablet Take 1 tablet (10 mg) by mouth every evening (Patient not taking: Reported on 3/4/2020) 30 tablet 3     clotrimazole (LOTRIMIN) 1 % cream INSERT 1 APPLICATORFUL VAGINALLY TWICE A DAY FOR VAGINAL PAIN (Patient not taking: Reported on 3/4/2020) 12 g 0     clotrimazole (LOTRIMIN) 1 % external cream Apply topically 2 times daily (Patient not taking: Reported on 3/4/2020) 12 g 0     diclofenac (VOLTAREN) 1 % GEL topical gel Apply 2 g topically 4 times daily to hands (Patient not taking: Reported on 3/4/2020) 100 g 11     latanoprost (XALATAN) 0.005 % ophthalmic solution Place 1 drop into both eyes At  Bedtime (Patient not taking: Reported on 3/4/2020) 2.5 mL 11     nystatin (MYCOSTATIN) 597757 UNIT/ML suspension Take 5 mLs (500,000 Units) by mouth 4 times daily - swish and spit for 7 days (Patient not taking: Reported on 3/4/2020) 60 mL 1     order for DME autoPAP 12-15 cmH20 (Patient not taking: Reported on 3/4/2020) 1 Device 0     order for DME Equipment being ordered: air pressure mattress (Patient not taking: Reported on 3/4/2020) 1 Device 0     order for DME Equipment being ordered: Prosthetic legs (Patient not taking: Reported on 3/4/2020) 2 each 0     order for DME Equipment being ordered: lift recliner (Patient not taking: Reported on 3/4/2020) 1 Device 0     order for DME Equipment being ordered: Hospital Bed with side rails (Patient not taking: Reported on 3/4/2020) 1 each 0     order for DME Equipment being ordered: Power Wheel Chair (Patient not taking: Reported on 3/4/2020) 1 Device 0     order for DME Equipment being ordered: bandage tape, prefer paper (Patient not taking: Reported on 3/4/2020) 1 Package 0     order for DME Full electric hospital bed with half rails    Dx: M73835, I110, J449  Length of need: lifetime (Patient not taking: Reported on 3/4/2020) 1 Device 0     order for DME Wheel Chair Cushion: 18 x 18 inch Roho cushion (Patient not taking: Reported on 3/4/2020) 1 Device 0     order for DME Hospital bed with side rails (Patient not taking: Reported on 3/4/2020) 1 Device 0     order for DME Equipment being ordered: CPAP supplies mask, hose, filters, cushion    fax to [facility] at [phone] (Patient not taking: Reported on 3/4/2020) 10 Device prn     order for DME Equipment being ordered: Nebulizer and tubing supplies (Patient not taking: Reported on 3/4/2020) 1 Units 0     order for DME Equipment being ordered: Glucerna daily shakes with each meal (Patient not taking: Reported on 3/4/2020) 1 Box 11     ORDER FOR DME Equipment being ordered: Depends briefs (Patient not taking:  Reported on 3/4/2020) 1 Month 11     oxyCODONE (ROXICODONE) 5 MG tablet TAKE 1 TABLET BY MOUTH EVERY 6 HOURS AS NEEDED  0     sucralfate (CARAFATE) 1 GM tablet TAKE 1 TAB BY MOUTH FOUR TIMES DAILY. MAY DISSOLVE IN 10ML WATER ISNECESSARY (Patient not taking: Reported on 3/4/2020) 120 tablet 11     VENTOLIN  (90 Base) MCG/ACT inhaler Inhale 2 puffs into the lungs every 6 hours as needed for shortness of breath / dyspnea or wheezing (Patient not taking: Reported on 3/4/2020) 8.5 g 11         Problem List:  Patient Active Problem List    Diagnosis Date Noted     Neurogenic bladder 12/23/2019     Priority: Medium     Added automatically from request for surgery [id number]       Suprapubic catheter (H) 11/04/2019     Priority: Medium     Polypharmacy 10/31/2019     Priority: Medium     Blind left eye      Priority: Medium     Blindness left eye - since 2006 stroke per patient.         Chronic back pain      Priority: Medium     Legally blind in right eye, as defined in [place]      Priority: Medium     No periphal vision right eye       Other neuromuscular dysfunction of bladder 09/24/2019     Priority: Medium     S/P AKA (above knee amputation) bilateral (H) 06/19/2019     Priority: Medium     s/p left above-the-knee amputation for peripheral vascular disease and thrombosis on 06/06/2016 and a right above-the-knee amputation for peripheral arterial disease on 11/23/2016.        Chronic, continuous use of opioids 06/19/2019     Priority: Medium     Tobacco abuse 06/26/2018     Priority: Medium     History of CVA (cerebrovascular accident) 06/25/2018     Priority: Medium     She had 70% stenosis of her left internal carotid artery in 2006, initially presented with left eye blindness due to central retinal artery occlusion. She also had left M1 occlusion. She was treated with left carotid angioplasty and stenting to improve collateral flow to the left MCA territory throught BONIFACIO and left PCA, which has a fetal origin from  the left ICA       Migraine headache 03/20/2018     Priority: Medium     Incontinence 01/16/2018     Priority: Medium     Functional incontinence 07/19/2017     Priority: Medium     History of recurrent UTIs 07/19/2017     Priority: Medium     Bee sting reaction, undetermined intent, subsequent encounter 04/06/2017     Priority: Medium     Coronary artery disease involving native coronary artery of native heart 04/04/2017     Priority: Medium     -Unstable angina and inferior wall myocardial infarction [facility] 09/2016.  EF by cardiac MRI in June had been 60%. Angiography in 09/2016 with high-grade stenosis of the proximal right coronary artery.  Intracoronary stenting was performed.  Her ejection fraction fell to 35%-40%. There was evidence of LAD distribution akinesis as well as the inferior wall akinesis on echocardiogram in 12/2016.   -Atypical chest pain on 05/02/18.  Coronary angiography  demonstrated moderate in-stent renarrowing within the right coronary artery which did not appear to be flow limiting with a normal fractional flow reserve.  There was only mild disease in the left coronary system with some calcification and 15%-20% in the proximal LAD and 25%-35% narrowing after the septal .  The mid to distal LAD was normal.  The diagonal had a less than 35% stenosis.  Circumflex in essence comprised 1 multi-branching obtuse marginal with trivial plaque.  Ejection fraction on left ventriculogram was 52%-55% with end-diastolic pressure of 18 mmHg.  Medical therapy was suggested.        Complex sleep apnea syndrome 03/15/2017     Priority: Medium     JOSE (obstructive sleep apnea)-  02/22/2017     Priority: Medium     10/26/2012 - Polysomnography at [facility] -  min; AHI 14; RDI 22; ERIN 5/hr; No PLMs or RBD.  11/16/2012 - Titration Polysomnography at [facility] - Started on CPAP and developed treatment-emergent central apneas.  Adaptive Servo-Ventilation titration optimal but did well on CPAP  8 as well. Put on CPAP 8 cmH2O.    Has systolic CHF, intrathecal narcotic pump, and treatment-emergent Central Sleep Apnea on CPAP.  Titration Study: 2/26/2017 - (130.0 lbs) CPAP was titrated to a pressure of 12 with an AHI of 38.5.  Time Spent in REM supine at this pressure was 7.0  Titration Study: 11/20/2019 (130.0 lbs)  Supine REM was seen at 11 cmH20 with fair control of events, but persistent RERAs, snoring, and airflow limitation. Events often appeared periodic, and cheyyne-fang morphology was suggested at times, but periodicity was not well consolidated and circulation times were not delayed.         Primary open angle glaucoma of both eyes, severe stage 12/08/2016     Priority: Medium     Pseudophakia of right eye 12/08/2016     Priority: Medium     Cataract, left eye 12/08/2016     Priority: Medium     Diabetes mellitus type 2 without retinopathy (H) 12/08/2016     Priority: Medium     Type 2 diabetes mellitus with diabetic peripheral angiopathy without gangrene, without long-term current use of insulin (H) 10/12/2016     Priority: Medium     Essential hypertension 09/02/2016     Priority: Medium     Dysphagia 08/09/2016     Priority: Medium     chronic dysphagia, esophageal strictures and multple previous dilatations.       Osteoarthritis 05/19/2016     Priority: Medium     Stenosis of carotid artery 04/01/2016     Priority: Medium     History of a left carotid stent placement in 2006.    Carotid angiogram 4/2016 showed 80% stenosis of the cervical left carotid artery.  Dr Richardson performed balloon angioplasty with subsequent <25% stenosis.  Carotid US 5/2019: < 50% stenosis to ASTRID and patent left LIC artery stent.       PAD (peripheral artery disease) (H) 09/14/2015     Priority: Medium     S/p bilateral above-knee amputation 2016       Chronic neck pain 01/15/2015     Priority: Medium     Intestinal malabsorption 08/06/2014     Priority: Medium     Person who has had sex change operation 01/20/2014      Priority: Medium     SEX TRANSFORMATION SURGERY, MALE TO FEMALE 1974       Vertigo 11/08/2013     Priority: Medium     AS (sickle cell trait) (H) 10/08/2013     Priority: Medium     Schizoaffective disorder (H) 06/07/2013     Priority: Medium     Osteoporosis 01/21/2013     Priority: Medium     Simple chronic bronchitis (H) 01/01/2013     Priority: Medium     Chronic cough, >75 pack years  Pulmonary function test in the past have shown normal spirometry and normal lung volumes and mildly reduced diffusion capacity.     CT scan 2018 showed no evidence of bronchiectasis or emphysema.        Seizure disorder (H) 09/04/2012     Priority: Medium     Hyperlipidemia LDL goal <100 09/04/2012     Priority: Medium     Anxiety disorder 07/29/2009     Priority: Medium     Hx of colonic polyp 07/13/2009     Priority: Medium     Colonoscopy completed on July 2009.  See report for full detail.  Please re refer in 5 years for repeat colon cancer screening if medically appropriate.  Need colyte 4/2 preparation.       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 The most likely occurrence of a 46,XY karyotype with a female phenotype is Androgen Insensitivity Syndrome (testicular feminizing syndrome), an X-linked recessive disorder caused by a mutation in the androgen receptor gene; it may also be due  to XY pure gonadal dysgenesis.          Chronic pain syndrome 03/20/2009     Priority: Medium     Has a fentanyl PUMP    Patient is followed by Allan Casey for ongoing prescription of pain meds  All refills should be approved by this provider, or covering partner.    Maximum quantity per month: 1 month  Clinic visit frequency required: Q 6  months     Controlled substance agreement:  Encounter-Level CSA:     There are no  encounter-level csa.        Pain Clinic evaluation in the past: No    DIRE Total Score(s):  No flowsheet data found.    Last [facility] website verification:  6/6/18   [email or web address]       Thoracic or lumbosacral neuritis or radiculitis 03/20/2009     Priority: Medium     Lumbosacral radiculitis 03/20/2009     Priority: Medium     Iron deficiency anemia 11/18/2005     Priority: Medium     Degeneration of intervertebral disc of lumbosacral region 11/18/2005     Priority: Medium     Esophageal reflux 10/31/2019     Priority: Low        Impression/Plan:    Mild Sleep obstructive sleep apnea by sleep study 2012.  History of complex apnea with 'treatment emergent central apneas' by titration styudy 2012. Treated with initially ASV, later PAP after EF worsened. Study 2/2017 suggested severe incompletely treated obstructive sleep apnea at CPAP 12 cmH20.  More recent study 11/2019 with acceptable titration, but probable underlying periodic breathing. Multiple comorbidities.     She is tolerating CPAP okay, but has had problems with recent upper respiratory illnesses and her  total sleep time is short (5-6 hours) under best of circumstances related to her chronic pain.     She is interested in a trial of Bilevel PAP due to her comorbid COPD and some difficulty tolerating CPAP when ill.     I expressed concern that this might worsen her central sleep apnea, but she remains interested in a trial     Polysomnogram with all night titration of bilvelPAP starting at 10/7 cmH20. Could consider use of ASV if this is ineffective because of significant exacerbation of [place]    Last study was done at [place], patient claims independence in [facility], no tech notes suggest otherwise from last study    Forty minutes spent with patient, all of which were spent face-to-face counseling, consulting, coordinating plan of care.      Gustavo Wright MD     CC: Allan Casey

## 2020-03-04 NOTE — PATIENT INSTRUCTIONS
Continue fluticasone (Flonase) 2 sprays each nostril daily x3 weeks  Start loratidine (Claritin) 10 mg daily x30 days  Start azithromycin for current infection  Follow up with primary care provider in 1-2 weeks as needed

## 2020-03-04 NOTE — TELEPHONE ENCOUNTER
Kirk calling back to check status of message below.     Please advise of discontinuing cough syrup. He is concerned she does not have a cough. This is curently scheduled on their MAR. He also voice concern that patient has been on recurrent antibiotic therapy and wants to bring this to the providers attention. Any concerns?    Also please fax dc'd orders to [phone]     Valeria GIFFORD, RN, PHN

## 2020-03-04 NOTE — TELEPHONE ENCOUNTER
Reason for Call:  Other Medication Question    Detailed comments: Mahesh at [facility] called with questions regarding a prescription for Dextromethorphan oral suspension. She stated that the last time the patient has this filled as in January of 2019 and last time a refill request was sent to the pharmacy they were out of this medication and she is wondering if there is another medication she can take instead. She would like a call back to discuss her questions.     Phone Number Patient can be reached at: Other phone number: [phone]    Best Time: Any    Can we leave a detailed message on this number? Not Applicable    Call taken on 3/4/2020 at 10:36 AM by Ginger tOero

## 2020-03-05 ASSESSMENT — PATIENT HEALTH QUESTIONNAIRE - PHQ9
SUM OF ALL RESPONSES TO PHQ QUESTIONS 1-9: 9
5. POOR APPETITE OR OVEREATING: NOT AT ALL

## 2020-03-05 ASSESSMENT — ANXIETY QUESTIONNAIRES
1. FEELING NERVOUS, ANXIOUS, OR ON EDGE: NOT AT ALL
5. BEING SO RESTLESS THAT IT IS HARD TO SIT STILL: NOT AT ALL
3. WORRYING TOO MUCH ABOUT DIFFERENT THINGS: NOT AT ALL
7. FEELING AFRAID AS IF SOMETHING AWFUL MIGHT HAPPEN: NOT AT ALL
GAD7 TOTAL SCORE: 0
6. BECOMING EASILY ANNOYED OR IRRITABLE: NOT AT ALL
2. NOT BEING ABLE TO STOP OR CONTROL WORRYING: NOT AT ALL
IF YOU CHECKED OFF ANY PROBLEMS ON THIS QUESTIONNAIRE, HOW DIFFICULT HAVE THESE PROBLEMS MADE IT FOR YOU TO DO YOUR WORK, TAKE CARE OF THINGS AT HOME, OR GET ALONG WITH OTHER PEOPLE: NOT DIFFICULT AT ALL

## 2020-03-06 ASSESSMENT — ANXIETY QUESTIONNAIRES: GAD7 TOTAL SCORE: 0

## 2020-03-12 ENCOUNTER — OFFICE VISIT (OUTPATIENT)
Dept: INTERNAL MEDICINE | Facility: CLINIC | Age: 70
End: 2020-03-12
Payer: COMMERCIAL

## 2020-03-12 ENCOUNTER — TELEPHONE (OUTPATIENT)
Dept: INTERNAL MEDICINE | Facility: CLINIC | Age: 70
End: 2020-03-12

## 2020-03-12 VITALS
RESPIRATION RATE: 16 BRPM | WEIGHT: 125 LBS | HEIGHT: 55 IN | HEART RATE: 75 BPM | OXYGEN SATURATION: 97 % | SYSTOLIC BLOOD PRESSURE: 130 MMHG | DIASTOLIC BLOOD PRESSURE: 78 MMHG | BODY MASS INDEX: 28.93 KG/M2

## 2020-03-12 DIAGNOSIS — I10 ESSENTIAL HYPERTENSION: Chronic | ICD-10-CM

## 2020-03-12 DIAGNOSIS — E11.9 DIABETES MELLITUS TYPE 2 WITHOUT RETINOPATHY (H): Chronic | ICD-10-CM

## 2020-03-12 DIAGNOSIS — J34.9 SINUS DISORDER: Primary | ICD-10-CM

## 2020-03-12 DIAGNOSIS — J44.9 CHRONIC OBSTRUCTIVE PULMONARY DISEASE, UNSPECIFIED COPD TYPE (H): ICD-10-CM

## 2020-03-12 PROCEDURE — 90471 IMMUNIZATION ADMIN: CPT | Performed by: INTERNAL MEDICINE

## 2020-03-12 PROCEDURE — 99214 OFFICE O/P EST MOD 30 MIN: CPT | Performed by: INTERNAL MEDICINE

## 2020-03-12 PROCEDURE — 90750 HZV VACC RECOMBINANT IM: CPT | Performed by: INTERNAL MEDICINE

## 2020-03-12 RX ORDER — ALBUTEROL SULFATE 90 UG/1
2 AEROSOL, METERED RESPIRATORY (INHALATION) EVERY 6 HOURS PRN
Qty: 8.5 G | Refills: 11 | Status: SHIPPED | OUTPATIENT
Start: 2020-03-12 | End: 2020-05-14

## 2020-03-12 RX ORDER — FLUTICASONE PROPIONATE 50 MCG
1 SPRAY, SUSPENSION (ML) NASAL DAILY
Qty: 15.8 ML | Refills: 11 | Status: ON HOLD | OUTPATIENT
Start: 2020-03-12 | End: 2020-06-11

## 2020-03-12 ASSESSMENT — MIFFLIN-ST. JEOR: SCORE: 733.63

## 2020-03-12 NOTE — TELEPHONE ENCOUNTER
Left message on machine to call back    Patient scheduled for OV for referral to ENT. Per provider if referral is all that is needed we can do that without her coming into clinic.

## 2020-03-12 NOTE — PROGRESS NOTES
Subjective     Sonya Foote is a 70 year old female who presents to clinic today for the following health issues:    HPI   Patient is here today for follow-up with a prior history of numerous clinic visits and multiple complaints now recently seen in urgent care for issues of apparent sinus disorder.  She has been on antihistamines and nasal inhalers in the past and was recently treated for sinusitis.  She is here today now requesting a referral to an ENT provider.    ED/UC Followup:    Facility:  [facility]  Date of visit: 3/4/20  Reason for visit: ENT symptoms  Current Status: Minimal improvement after completing azithromycin. Patient requesting referral to ENT      Other concerns:  1. Discuss shingles vaccine as she is interested in obtaining such and has had the old Zostavax vaccination in the past.  2.  She is also requesting an update of her albuterol inhaler and she would like to get back on her Flonase which she feels she needs for her sinuses.  This was discontinued on February 12 at the request of her current living site.    Patient Active Problem List   Diagnosis     Seizure disorder (H)     Osteoporosis     Schizoaffective disorder (H)     AS (sickle cell trait) (H)     Vertigo     Person who has had sex change operation     Intestinal malabsorption     Chronic neck pain     Chronic pain syndrome     Hx of colonic polyp     Iron deficiency anemia     Degeneration of intervertebral disc of lumbosacral region     Thoracic or lumbosacral neuritis or radiculitis     Androgen insensitivity syndrome     PAD (peripheral artery disease) (H)     Stenosis of carotid artery     Osteoarthritis     Essential hypertension     Type 2 diabetes mellitus with diabetic peripheral angiopathy without gangrene, without long-term current use of insulin (H)     Anxiety disorder     Lumbosacral radiculitis     Peripheral neuropathy     Primary open angle glaucoma of both eyes, severe stage     Pseudophakia of right eye      Cataract, left eye     Diabetes mellitus type 2 without retinopathy (H)     Simple chronic bronchitis (H)     JOSE (obstructive sleep apnea)-      Complex sleep apnea syndrome     Coronary artery disease involving native coronary artery of native heart     Bee sting reaction, undetermined intent, subsequent encounter     Functional incontinence     History of recurrent UTIs     Dysphagia     Incontinence     Migraine headache     History of CVA (cerebrovascular accident)     Tobacco abuse     S/P AKA (above knee amputation) bilateral (H)     Chronic, continuous use of opioids     Other neuromuscular dysfunction of bladder     Hyperlipidemia LDL goal <100     Blind left eye     Chronic back pain     Blind right eye     Polypharmacy     Esophageal reflux     Suprapubic catheter (H)     Neurogenic bladder     Past Surgical History:   Procedure Laterality Date     AMPUTATE LEG ABOVE KNEE Left 6/11/2016    Procedure: AMPUTATE LEG ABOVE KNEE;  Surgeon: Mello Rodriguez MD;  [facility]     AMPUTATE LEG BELOW KNEE Right 11/7/2016    Procedure: AMPUTATE LEG BELOW KNEE;  Surgeon: Savannah Durant MD;  [facility]     AMPUTATE REVISION STUMP LOWER EXTREMITY Right 11/11/2016    Procedure: AMPUTATE REVISION STUMP LOWER EXTREMITY;  Surgeon: Savannah Durant MD;  [facility]     AMPUTATE REVISION STUMP LOWER EXTREMITY Right 11/16/2016    Procedure: AMPUTATE REVISION STUMP LOWER EXTREMITY;  Surgeon: Savannah Durant MD;  [facility]     AMPUTATE TOE(S) Right 1/5/2016    Procedure: AMPUTATE TOE(S);  Surgeon: Mello Gaines DPM;  Location: [facility]     ANGIOGRAM Bilateral 11/21/2014    Procedure: ANGIOGRAM;  Surgeon: Savannah Durant MD;  [facility]     ANGIOGRAM Left 1/16/2015    Procedure: ANGIOGRAM;  Surgeon: Savannah Durant MD;  [facility]     ANGIOGRAM Bilateral 9/14/2015    Procedure: ANGIOGRAM;  Surgeon: Savannah Durant MD;  [facility]     ANGIOGRAM Left 10/12/2015    Procedure: ANGIOGRAM;  Surgeon: Carleen  MD Savannah;  [facility]     ANGIOGRAM Right 6/6/2016    Procedure: ANGIOGRAM;  Surgeon: Savannah Durant MD;  [facility]     ANGIOPLASTY Right 6/6/2016    Procedure: ANGIOPLASTY;  Surgeon: Savannah Durant MD;  [facility]     APPENDECTOMY       BREAST SURGERY      right breast bx (benign)     CATARACT IOL, RT/LT Right      CHOLECYSTECTOMY       COLONOSCOPY N/A 8/25/2014    Procedure: COLONOSCOPY;  Surgeon: Mello Ferrer MD;  [facility]     COLONOSCOPY WITH CO2 INSUFFLATION N/A 8/20/2014    Procedure: COLONOSCOPY WITH CO2 INSUFFLATION;  Surgeon: Duane, William Charles, MD;  Location: [facility]     CYSTOSCOPY, INTRAVESICAL INJECTION N/A 10/31/2019    Procedure: CYSTOSCOPY, BOTOX INJECTION, Suprapubic Catheter Exchange;  Surgeon: Loki Gordon MD;  [facility]     CYSTOSTOMY, INSERT TUBE SUPRAPUBIC, COMBINED N/A 1/16/2018    Procedure: COMBINED CYSTOSTOMY, INSERT TUBE SUPRAPUBIC;  Cystoscopy, Intraoperative Ultrasound, Suprapubic Tube Placement;  Surgeon: Keanu Dawson MD;  [facility]     ENDARTERECTOMY FEMORAL  5/23/2014    Procedure: ENDARTERECTOMY FEMORAL;  Surgeon: Jason Joshi MD;  [facility]     ESOPHAGOSCOPY, GASTROSCOPY, DUODENOSCOPY (EGD), COMBINED  12/14/2012    Procedure: COMBINED ESOPHAGOSCOPY, GASTROSCOPY, DUODENOSCOPY (EGD), BIOPSY SINGLE OR MULTIPLE;  ESOPHAGOSCOPY, GASTROSCOPY, DUODENOSCOPY (EGD), DILATATION ;  Surgeon: Elizabeth Stevenson MD;  Location:  GI     ESOPHAGOSCOPY, GASTROSCOPY, DUODENOSCOPY (EGD), COMBINED  12/31/2013    Procedure: COMBINED ESOPHAGOSCOPY, GASTROSCOPY, DUODENOSCOPY (EGD), BIOPSY SINGLE OR MULTIPLE;;  Surgeon: Clemente Lopez MD;  [facility]     ESOPHAGOSCOPY, GASTROSCOPY, DUODENOSCOPY (EGD), COMBINED  4/1/2014    Procedure: COMBINED ESOPHAGOSCOPY, GASTROSCOPY, DUODENOSCOPY (EGD);;  Surgeon: Clemente Lopez MD;  [facility]     ESOPHAGOSCOPY, GASTROSCOPY, DUODENOSCOPY (EGD), COMBINED  6/28/2014    Procedure:  COMBINED ESOPHAGOSCOPY, GASTROSCOPY, DUODENOSCOPY (EGD);  Surgeon: Clemente Lopez MD;  [facility]     ESOPHAGOSCOPY, GASTROSCOPY, DUODENOSCOPY (EGD), COMBINED N/A 8/20/2014    Procedure: COMBINED ESOPHAGOSCOPY, GASTROSCOPY, DUODENOSCOPY (EGD), BIOPSY SINGLE OR MULTIPLE;  Surgeon: Duane, William Charles, MD;  [facility]     ESOPHAGOSCOPY, GASTROSCOPY, DUODENOSCOPY (EGD), COMBINED N/A 8/22/2014    Procedure: COMBINED ESOPHAGOSCOPY, GASTROSCOPY, DUODENOSCOPY (EGD), BIOPSY SINGLE OR MULTIPLE;  Surgeon: Mello Ferrer MD;  [facility]     ESOPHAGOSCOPY, GASTROSCOPY, DUODENOSCOPY (EGD), COMBINED N/A 10/2/2014    Procedure: COMBINED ESOPHAGOSCOPY, GASTROSCOPY, DUODENOSCOPY (EGD), BIOPSY SINGLE OR MULTIPLE;  Surgeon: Remy Haskins MD;  [facility]     ESOPHAGOSCOPY, GASTROSCOPY, DUODENOSCOPY (EGD), COMBINED Left 12/15/2014    Procedure: COMBINED ESOPHAGOSCOPY, GASTROSCOPY, DUODENOSCOPY (EGD), BIOPSY SINGLE OR MULTIPLE;  Surgeon: Remy Haskins MD;  [facility]     ESOPHAGOSCOPY, GASTROSCOPY, DUODENOSCOPY (EGD), COMBINED N/A 2/25/2015    Procedure: COMBINED ENDOSCOPIC ULTRASOUND, ESOPHAGOSCOPY, GASTROSCOPY, DUODENOSCOPY (EGD), FINE NEEDLE ASPIRATE/BIOPSY;  Surgeon: Clemente Lugo MD;  [facility]     ESOPHAGOSCOPY, GASTROSCOPY, DUODENOSCOPY (EGD), COMBINED Left 2/25/2015    Procedure: COMBINED ESOPHAGOSCOPY, GASTROSCOPY, DUODENOSCOPY (EGD), BIOPSY SINGLE OR MULTIPLE;  Surgeon: Clemente Lugo MD;  [facility]     ESOPHAGOSCOPY, GASTROSCOPY, DUODENOSCOPY (EGD), COMBINED N/A 9/25/2016    Procedure: COMBINED ESOPHAGOSCOPY, GASTROSCOPY, DUODENOSCOPY (EGD);  Surgeon: Aziza Patiño MD;  [facility]     ESOPHAGOSCOPY, GASTROSCOPY, DUODENOSCOPY (EGD), COMBINED N/A 1/18/2017    Procedure: COMBINED ESOPHAGOSCOPY, GASTROSCOPY, DUODENOSCOPY (EGD), BIOPSY SINGLE OR MULTIPLE;  Surgeon: Clemente Lopez MD;  Location:  [facility]     ESOPHAGOSCOPY, GASTROSCOPY, DUODENOSCOPY (EGD),  COMBINED N/A 11/26/2017    Procedure: COMBINED ESOPHAGOSCOPY, GASTROSCOPY, DUODENOSCOPY (EGD), REMOVE FOREIGN BODY;  Esophagogastroduodenoscopy with foreign body extraction  ;  Surgeon: Herberth Castrejon MD;  [facility]     ESOPHAGOSCOPY, GASTROSCOPY, DUODENOSCOPY (EGD), COMBINED N/A 11/26/2017    Procedure: COMBINED ESOPHAGOSCOPY, GASTROSCOPY, DUODENOSCOPY (EGD), REMOVE FOREIGN BODY;;  Surgeon: Herberth Castrejon MD;  [facility]     FASCIOTOMY LOWER EXTREMITY Left 6/10/2016    Procedure: FASCIOTOMY LOWER EXTREMITY;  Surgeon: Mello Rodriguez MD;  [facility]     HC CAPSULE ENDOSCOPY N/A 8/25/2014    Procedure: CAPSULE/PILL CAM ENDOSCOPY;  Surgeon: Remy Haskins MD;  [facility]     HC CAPSULE ENDOSCOPY N/A 10/2/2014    Procedure: CAPSULE/PILL CAM ENDOSCOPY;  Surgeon: Remy Haskins MD;  [facility]     ORTHOPEDIC SURGERY      broken wrist repair     SEX TRANSFORMATION SURGERY, MALE TO FEMALE  1974 1974     SINUS SURGERY      cyst removed     TONSILLECTOMY       VASCULAR SURGERY  2006    Left carotid stent       Social History     Tobacco Use     Smoking status: Current Every Day Smoker     Packs/day: 0.25     Years: 30.00     Pack years: 7.50     Types: Cigarettes, Cigars     Smokeless tobacco: Never Used   Substance Use Topics     Alcohol use: No     Alcohol/week: 0.0 standard drinks     Family History   Problem Relation Age of Onset     Dementia Mother      Glaucoma Mother      Diabetes Mother         may have been type 1, childhood     Coronary Artery Disease Mother         MI     Glaucoma Father      Diabetes Father         may hev been type 1 - ??     Heart Failure Father      Cancer Maternal Aunt         leukemia     Schizophrenia Brother      Depression Brother      Suicide Sister      Depression Sister      Diabetes Sister      Cancer Maternal Aunt         ovarian     Glaucoma Maternal Grandmother      Diabetes Maternal Grandmother      Glaucoma Maternal  Grandfather      Diabetes Maternal Grandfather      Glaucoma Paternal Grandmother      Diabetes Paternal Grandmother      Glaucoma Paternal Grandfather      Diabetes Paternal Grandfather      Breast Cancer Sister      Cerebrovascular Disease Brother      Colon Cancer No family hx of      Macular Degeneration No family hx of          Current Outpatient Medications   Medication Sig Dispense Refill     ACETAMINOPHEN PO Take 1,000 mg by mouth every 6 hours as needed for pain Not to exceed 4000 mg/day       ADVAIR DISKUS 250-50 MCG/DOSE diskus inhaler Inhale 1 puff into the lungs 2 times daily 60 Inhaler 11     albuterol (PROVENTIL) (2.5 MG/3ML) 0.083% neb solution INHALE 1 VIAL VIA NEBULIZER EVERY 6 HOURS AS NEEDED 360 mL 11     alendronate (FOSAMAX) 70 MG tablet Take 1 tablet (70 mg) by mouth with 8oz water every 7 days 30 minutes before breakfast and remain upright during this time. 12 tablet 3     aspirin EC 81 MG EC tablet Take 1 tablet (81 mg) by mouth daily 90 tablet 3     atorvastatin (LIPITOR) 40 MG tablet TAKE 1 TAB BY MOUTH ONCE DAILY 30 tablet 11     blood glucose monitoring (ONE TOUCH ULTRA 2) meter device kit Use to test blood sugars 3 times daily or as directed. 1 kit 0     blood glucose monitoring (ONE TOUCH ULTRA) test strip Use to test blood sugars 3 times daily or as directed. 3 Box 3     blood glucose monitoring (ONE TOUCH ULTRASOFT) lancets Use to test blood sugar 3 times daily or as directed. 100 each PRN     brimonidine (ALPHAGAN) 0.2 % ophthalmic solution Place 1 drop into the right eye 2 times daily 1 Bottle 11     capsaicin-menthol-methyl sal 0.025-1-12 % external cream Apply 1 Application topically daily as needed (topical pain) 56.6 g 1     Cholecalciferol (VITAMIN D) 2000 UNITS tablet Take 2,000 Units by mouth daily. 100 tablet 3     clotrimazole (LOTRIMIN) 1 % cream INSERT 1 APPLICATORFUL VAGINALLY TWICE A DAY FOR VAGINAL PAIN 12 g 0     clotrimazole (LOTRIMIN) 1 % external cream Apply  topically 2 times daily 12 g 0     diclofenac (VOLTAREN) 1 % GEL topical gel Apply 2 g topically 4 times daily to hands 100 g 11     dorzolamide (TRUSOPT) 2 % ophthalmic solution Place 1 drop into the right eye 2 times daily 1 Bottle 11     escitalopram (LEXAPRO) 10 MG tablet TAKE 1 TAB BY MOUTH ONCE DAILY 30 tablet 11     ferrous sulfate (IRON) 325 (65 FE) MG tablet Take 1 tablet (325 mg) by mouth 2 times daily With meals 60 tablet 2     fluticasone (FLONASE) 50 MCG/ACT nasal spray Spray 1 spray into both nostrils daily       lactulose (CHRONULAC) 10 GM/15ML solution Take 20 g by mouth as needed for constipation       latanoprost (XALATAN) 0.005 % ophthalmic solution Place 1 drop into both eyes At Bedtime 2.5 mL 11     levETIRAcetam (KEPPRA) 500 MG tablet TAKE 1 TAB BY MOUTH TWICE A DAY 60 tablet 5     lidocaine (LIDODERM) 5 % patch APPLY 1 PATCHES TO PAINFUL AREA AT ONCE FOR UP TO 12 HOURS WITHIN A 24 HOUR PERIOD REMOVE AFTER 12 HOURS 30 patch 1     lisinopril (PRINIVIL/ZESTRIL) 20 MG tablet TAKE 1 TAB BY MOUTH ONCE DAILY 30 tablet 11     loratadine (CLARITIN) 10 MG tablet Take 1 tablet (10 mg) by mouth daily 30 tablet 1     metFORMIN (GLUCOPHAGE) 500 MG tablet TAKE 1 TAB BY MOUTH IN THE EVENING WITH DINNER 30 tablet 5     metoprolol succinate ER (TOPROL-XL) 50 MG 24 hr tablet TAKE 1 TAB BY MOUTH ONCE DAILY 30 tablet 11     nicotine (NICODERM CQ) 14 MG/24HR 24 hr patch Place 1 patch onto the skin every 24 hours 30 patch 3     nystatin (MYCOSTATIN) 488714 UNIT/ML suspension Take 5 mLs (500,000 Units) by mouth 4 times daily - swish and spit for 7 days 60 mL 1     order for DME autoPAP 12-15 cmH20 1 Device 0     order for DME Equipment being ordered: air pressure mattress 1 Device 0     order for DME Equipment being ordered: Prosthetic legs 2 each 0     order for DME Equipment being ordered: lift recliner 1 Device 0     order for DME Equipment being ordered: Power Wheel Chair 1 Device 0     order for DME Equipment  being ordered: bandage tape, prefer paper 1 Package 0     order for DME Full electric hospital bed with half rails    Dx: M95284, I110, J449  Length of need: lifetime 1 Device 0     order for DME Wheel Chair Cushion: 18 x 18 inch Roho cushion 1 Device 0     order for DME Hospital bed with side rails 1 Device 0     order for DME Equipment being ordered: Nebulizer and tubing supplies 1 Units 0     order for DME Equipment being ordered: Glucerna daily shakes with each meal 1 Box 11     oxyCODONE (ROXICODONE) 5 MG tablet TAKE 1 TABLET BY MOUTH EVERY 6 HOURS AS NEEDED  0     pantoprazole (PROTONIX) 40 MG EC tablet TAKE 1 TAB BY MOUTH ONCE DAILY 30 tablet 11     phenytoin (DILANTIN) 100 MG capsule TAKE 2 CAPS (200MG) BY MOUTH TWICE A  capsule 11     polyethylene glycol (MIRALAX/GLYCOLAX) Packet Take 17 g by mouth daily Dissolved in water or juice        pregabalin (LYRICA) 75 MG capsule Take 150 mg by mouth 3 times daily       risperiDONE (RISPERDAL) 0.5 MG tablet TAKE 1 TAB BY MOUTH AT BEDTIME 30 tablet 5     sennosides (SENOKOT) 8.6 MG tablet Take 1 tablet by mouth 2 times daily        solifenacin (VESICARE) 10 MG tablet TAKE 1 TAB BY MOUTH ONCE DAILY 30 tablet 11     sucralfate (CARAFATE) 1 GM tablet TAKE 1 TAB BY MOUTH FOUR TIMES DAILY. MAY DISSOLVE IN 10ML WATER ISNECESSARY 120 tablet 11     traZODone (DESYREL) 150 MG tablet TAKE 1 TAB BY MOUTH AT BEDTIME 30 tablet 11     umeclidinium (INCRUSE ELLIPTA) 62.5 MCG/INH inhaler Inhale 1 puff into the lungs daily 1 Inhaler 6     VENTOLIN  (90 Base) MCG/ACT inhaler Inhale 2 puffs into the lungs every 6 hours as needed for shortness of breath / dyspnea or wheezing 8.5 g 11     Allergies   Allergen Reactions     Bee Venom      Penicillins Anaphylaxis     Other reaction(s): Skin Rash and/or Hives. Tolerated cefepime in 12/2016     Dilantin [Phenytoin] Other (See Comments)     Generic dilantin only per pt     Iodide Hives     09/11/15: Pt states she can use iodine  "and doesn't have any problems      Iodine-131      Novocaine [Procaine] Hives     Other reaction(s): Skin Rash and/or Hives     Tositumomab      BP Readings from Last 3 Encounters:   03/04/20 124/72   03/04/20 138/77   02/15/20 97/63    Wt Readings from Last 3 Encounters:   03/04/20 56.7 kg (125 lb)   01/21/20 56.2 kg (124 lb)   01/09/20 56.2 kg (124 lb)         Reviewed and updated as needed this visit by Provider         Review of Systems   ROS COMP: CONSTITUTIONAL: NEGATIVE for fever, chills, change in weight  RESP: NEGATIVE for significant cough or SOB  CV: NEGATIVE for chest pain, palpitations or peripheral edema  GI: NEGATIVE for nausea, abdominal pain, heartburn, or change in bowel habits  : NEGATIVE for frequency, dysuria, or hematuria  MUSCULOSKELETAL: NEGATIVE for significant arthralgias or myalgia  HEME: NEGATIVE for bleeding problems  PSYCHIATRIC: NEGATIVE for changes in mood or affect      Objective    /78   Pulse 75   Resp 16   Ht 1.092 m (3' 7\")   Wt 56.7 kg (125 lb)   SpO2 97%   BMI 47.53 kg/m    Body mass index is 47.53 kg/m .  Physical Exam   GENERAL: alert and no distress  EYES: Eyes grossly normal to inspection, PERRL and conjunctivae and sclerae normal  HENT: ear canals and TM's normal, nose and mouth without ulcers or lesions  NECK: no adenopathy, no asymmetry, masses, or scars and thyroid normal to palpation  RESP: lungs clear to auscultation - no rales, rhonchi or wheezes  CV: regular rate and rhythm, normal S1 S2, no S3 or S4, no murmur, click or rub, no peripheral edema and peripheral pulses strong  PSYCH: mentation appears normal, affect normal/bright  Patient in an electric mobile wheelchair with bilateral AKA's noted      Lab Results   Component Value Date    A1C 5.0 12/16/2019    A1C 5.0 04/17/2019    A1C 5.1 06/06/2018    A1C 4.4 05/02/2018    A1C 4.5 08/24/2017       Assessment & Plan     1. Sinus disorder  No further indication for antibiotic therapy.  Suggest " restarting Flonase and patient was given referral to ENT per her request.  - OTOLARYNGOLOGY REFERRAL  - fluticasone (FLONASE) 50 MCG/ACT nasal spray; Spray 1 spray into both nostrils daily  Dispense: 15.8 mL; Refill: 11    2. Chronic obstructive pulmonary disease, unspecified COPD type (H)  Advised smoking cessation and refilled patient's albuterol inhaler.  Appears clinically stable  - VENTOLIN  (90 Base) MCG/ACT inhaler; Inhale 2 puffs into the lungs every 6 hours as needed for shortness of breath / dyspnea or wheezing  Dispense: 8.5 g; Refill: 11    3. Essential hypertension  Stable at goal on therapy continue with current medical management    4.  (E11.9) Diabetes mellitus type 2 without retinopathy (H)  Comment: Stable on therapy continuing with current dietary and medical management  Plan: A1c listed above is being stable and recommend recheck 6 months from last    See Patient Instructions    Return in about 1 month (around 4/12/2020) for f/u with routine sub-specialist.    Allan Casey MD  [facility]

## 2020-03-12 NOTE — NURSING NOTE
Prior to immunization administration, verified patients identity using patient s name and date of birth. Please see Immunization Activity for additional information.     Screening Questionnaire for Adult Immunization    Are you sick today?   No   Do you have allergies to medications, food, a vaccine component or latex?   No   Have you ever had a serious reaction after receiving a vaccination?   No   Do you have a long-term health problem with heart, lung, kidney, or metabolic disease (e.g., diabetes), asthma, a blood disorder, no spleen, complement component deficiency, a cochlear implant, or a spinal fluid leak?  Are you on long-term aspirin therapy?   Yes   Do you have cancer, leukemia, HIV/AIDS, or any other immune system problem?   No   Do you have a parent, brother, or sister with an immune system problem?   No   In the past 3 months, have you taken medications that affect  your immune system, such as prednisone, other steroids, or anticancer drugs; drugs for the treatment of rheumatoid arthritis, Crohn s disease, or psoriasis; or have you had radiation treatments?   Yes   Have you had a seizure, or a brain or other nervous system problem?   No   During the past year, have you received a transfusion of blood or blood    products, or been given immune (gamma) globulin or antiviral drug?   No   For women: Are you pregnant or is there a chance you could become       pregnant during the next month?   No   Have you received any vaccinations in the past 4 weeks?   No     Immunization questionnaire was positive for at least one answer.  Notified MN.        Per orders of Dr. Casey, injection of Shingrix given by Daniela Hancock. Patient instructed to remain in clinic for 15 minutes afterwards, and to report any adverse reaction to me immediately.       Screening performed by Daniela Hancock on 3/12/2020 at 11:12 AM.

## 2020-03-13 NOTE — TELEPHONE ENCOUNTER
Triage spoke with  home medical      On their end it looked as if patient had not been compliant with CPAP and had advised patient to return the product.     Per patient note below patient stated d/t her frequent URI, she was not able to tolerate it but now she is using it and it is helping.     Triage faxed recent sleep center notes: to [phone] as this indicated the reason of possible non-compliance.    Valeria CACERESN, RN, PHN

## 2020-03-13 NOTE — TELEPHONE ENCOUNTER
Triage LM on patient VM. To call back and cancel upcoming appointment if not needed for anything else.     Valeria CACERESN, RN, PHN

## 2020-03-13 NOTE — TELEPHONE ENCOUNTER
Patient informed. She has ENT appointment scheduled next week.    The appointment on 3/16/2020 that she needs a note from the doctor to indicating she has been sick and that her CPAP has been helping her.      Valeria GIFFORD, RN, PHN

## 2020-03-17 ENCOUNTER — TELEPHONE (OUTPATIENT)
Dept: INTERNAL MEDICINE | Facility: CLINIC | Age: 70
End: 2020-03-17

## 2020-03-17 NOTE — TELEPHONE ENCOUNTER
Reason for Call:  Other call back    Detailed comments: Melissa, the pt's dietitian, would like a call back to discuss the pt's diet.     Phone Number Patient can be reached at: Other phone number: [phone]     Best Time: 9 am- 4 pm    Can we leave a detailed message on this number? YES    Call taken on 3/17/2020 at 2:47 PM by DUKE FOSTER

## 2020-03-17 NOTE — TELEPHONE ENCOUNTER
Reason for Call:  Patient request    Detailed comments: Patient needs new orders for Speech Therapy, please call [phone] or fax [phone].    Phone Number Patient can be reached at: Other phone number: [phone]    Best Time: Anytime    Can we leave a detailed message on this number? YES    Call taken on 3/17/2020 at 3:59 PM by Jack Hale

## 2020-03-17 NOTE — TELEPHONE ENCOUNTER
Spoke with patients dietitian who wanted PCP to be aware that patient had previously been on a ground texture diet but discontinued on her own. She is now on a chopped texture diet. FYI only

## 2020-03-18 ENCOUNTER — TELEPHONE (OUTPATIENT)
Dept: OPHTHALMOLOGY | Facility: CLINIC | Age: 70
End: 2020-03-18

## 2020-03-18 NOTE — TELEPHONE ENCOUNTER
COVID-19 RESCHEDULES    Date contacted: March 18, 2020 2:25 PM    Type of contact: r left message    Current appointment date: 3/24/20    Attending provider: Sharda    Current Eye Symptoms:     Refills needed:     Best contact for rescheduling: same    Best date/time for rescheduling: any    Monie DUNLAP 2:25 PM March 18, 2020

## 2020-03-25 ENCOUNTER — DOCUMENTATION ONLY (OUTPATIENT)
Dept: SLEEP MEDICINE | Facility: CLINIC | Age: 70
End: 2020-03-25

## 2020-03-25 DIAGNOSIS — G47.39 COMPLEX SLEEP APNEA SYNDROME: ICD-10-CM

## 2020-03-25 DIAGNOSIS — G47.33 OSA (OBSTRUCTIVE SLEEP APNEA): ICD-10-CM

## 2020-03-25 NOTE — PROGRESS NOTES
6 Month STM visit    Diagnostic AHI: 14  PSG    Message left for patient to return call       Action plan: waiting for patient to return call.   pt to follow up per provider request    Device type: Auto-CPAP  PAP settings: CPAP min 12 cm  H20     CPAP max 15 cm  H20    CPAP fixed 12  cm  H20     90th % pressure 13  cm  H20    Objective measures: 14 day rolling measures         Compliance  64 %     % of night spent in large leak  24 % last  upload      AHI 6.28   last  upload      Average number of minutes 229           Objective measure goal  Compliance   Goal >70%  Leak   Goal < 10%  AHI  Goal < 5  Usage  Goal >240    Total time spent on accessing, reviewing and interpreting remote patient PAP therapy data:   10 minutes    Total time spent with direct patient communication :   3 minutes

## 2020-04-02 ENCOUNTER — TELEPHONE (OUTPATIENT)
Dept: INTERNAL MEDICINE | Facility: CLINIC | Age: 70
End: 2020-04-02

## 2020-04-02 DIAGNOSIS — B37.0 THRUSH: ICD-10-CM

## 2020-04-02 RX ORDER — NYSTATIN 100000/ML
500000 SUSPENSION, ORAL (FINAL DOSE FORM) ORAL 4 TIMES DAILY
Qty: 60 ML | Refills: 1 | Status: ON HOLD
Start: 2020-04-02 | End: 2020-06-11

## 2020-04-02 NOTE — TELEPHONE ENCOUNTER
Reason for Call:  Other call back    Detailed comments: pt called to request a prescription for thrush sent to the [facility]- phone [phone]- [facility] 1 room 135 is where pt is living. Pt said that her throat also is hurting now and she had thrush for 4 - 5 days. Please call pt to let her know when prescription is sent. Pt also said that she cannot leave her room and that the nursing [place] is not allowing anyone to go anywhere. Thanks.    Phone Number Patient can be reached at: Home number on file [phone] (home)    Best Time: anytime    Can we leave a detailed message on this number? YES    Call taken on 4/2/2020 at 3:36 PM by MARLEN XIE

## 2020-04-02 NOTE — TELEPHONE ENCOUNTER
It appears there is already nystatin suspension on the patient's medication list with a noted refill available.  I would make sure that that is available for patient and if not this may be sent to the appropriate pharmacy for filling.  Prescription signed

## 2020-04-07 ENCOUNTER — TELEPHONE (OUTPATIENT)
Dept: INTERNAL MEDICINE | Facility: CLINIC | Age: 70
End: 2020-04-07

## 2020-04-07 NOTE — TELEPHONE ENCOUNTER
[facility] calling looking for verbal orders.  Please call Coni Torres. Ph# [phone]. ok to leave detailed VM.     1) would like to discharge margot (says margot worked well and she is now at a good spot weight wise, don't want her to keep gaining weight, BMI is good), and she is eating well.     2) BUN to CR ratio is high - would like Order to encourage fluids daily.

## 2020-04-10 ENCOUNTER — HOSPITAL ENCOUNTER (OUTPATIENT)
Facility: CLINIC | Age: 70
Discharge: HOME | End: 2020-04-10
Attending: EMERGENCY MEDICINE | Admitting: EMERGENCY MEDICINE
Payer: COMMERCIAL

## 2020-04-10 ENCOUNTER — ANESTHESIA EVENT (OUTPATIENT)
Dept: SURGERY | Facility: CLINIC | Age: 70
End: 2020-04-10
Payer: COMMERCIAL

## 2020-04-10 ENCOUNTER — ANESTHESIA (OUTPATIENT)
Dept: SURGERY | Facility: CLINIC | Age: 70
End: 2020-04-10
Payer: COMMERCIAL

## 2020-04-10 ENCOUNTER — TELEPHONE (OUTPATIENT)
Dept: INTERNAL MEDICINE | Facility: CLINIC | Age: 70
End: 2020-04-10

## 2020-04-10 VITALS
HEART RATE: 81 BPM | RESPIRATION RATE: 18 BRPM | TEMPERATURE: 97.6 F | SYSTOLIC BLOOD PRESSURE: 137 MMHG | OXYGEN SATURATION: 97 % | DIASTOLIC BLOOD PRESSURE: 79 MMHG
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
## 2020-04-10 DIAGNOSIS — T18.128A ESOPHAGEAL OBSTRUCTION DUE TO FOOD IMPACTION: ICD-10-CM

## 2020-04-10 LAB
ANION GAP SERPL CALCULATED.3IONS-SCNC: 5 MMOL/L (ref 3–14)
BASOPHILS # BLD AUTO: 0.1 10E9/L (ref 0–0.2)
BASOPHILS NFR BLD AUTO: 0.5 %
BUN SERPL-MCNC: 12 MG/DL (ref 7–30)
CALCIUM SERPL-MCNC: 9.6 MG/DL (ref 8.5–10.1)
CHLORIDE SERPL-SCNC: 108 MMOL/L (ref 94–109)
CO2 SERPL-SCNC: 27 MMOL/L (ref 20–32)
CREAT SERPL-MCNC: 0.51 MG/DL (ref 0.52–1.04)
DIFFERENTIAL METHOD BLD: ABNORMAL
EOSINOPHIL # BLD AUTO: 0.2 10E9/L (ref 0–0.7)
EOSINOPHIL NFR BLD AUTO: 2 %
ERYTHROCYTE [DISTWIDTH] IN BLOOD BY AUTOMATED COUNT: 12.7 % (ref 10–15)
GFR SERPL CREATININE-BSD FRML MDRD: >90 ML/MIN/{1.73_M2}
GLUCOSE SERPL-MCNC: 81 MG/DL (ref 70–99)
HCT VFR BLD AUTO: 36.7 % (ref 35–47)
HGB BLD-MCNC: 12.1 G/DL (ref 11.7–15.7)
IMM GRANULOCYTES # BLD: 0 10E9/L (ref 0–0.4)
IMM GRANULOCYTES NFR BLD: 0.4 %
LYMPHOCYTES # BLD AUTO: 1.3 10E9/L (ref 0.8–5.3)
LYMPHOCYTES NFR BLD AUTO: 12 %
MCH RBC QN AUTO: 29.2 PG (ref 26.5–33)
MCHC RBC AUTO-ENTMCNC: 33 G/DL (ref 31.5–36.5)
MCV RBC AUTO: 88 FL (ref 78–100)
MONOCYTES # BLD AUTO: 0.8 10E9/L (ref 0–1.3)
MONOCYTES NFR BLD AUTO: 7.4 %
NEUTROPHILS # BLD AUTO: 8.5 10E9/L (ref 1.6–8.3)
NEUTROPHILS NFR BLD AUTO: 77.7 %
NRBC # BLD AUTO: 0 10*3/UL
NRBC BLD AUTO-RTO: 0 /100
PLATELET # BLD AUTO: 291 10E9/L (ref 150–450)
POTASSIUM SERPL-SCNC: 3.7 MMOL/L (ref 3.4–5.3)
RBC # BLD AUTO: 4.15 10E12/L (ref 3.8–5.2)
SODIUM SERPL-SCNC: 140 MMOL/L (ref 133–144)
WBC # BLD AUTO: 11 10E9/L (ref 4–11)

## 2020-04-10 PROCEDURE — 80048 BASIC METABOLIC PNL TOTAL CA: CPT | Performed by: EMERGENCY MEDICINE

## 2020-04-10 PROCEDURE — 25000566 ZZH SEVOFLURANE, EA 15 MIN: Performed by: INTERNAL MEDICINE

## 2020-04-10 PROCEDURE — 96374 THER/PROPH/DIAG INJ IV PUSH: CPT | Performed by: EMERGENCY MEDICINE

## 2020-04-10 PROCEDURE — 96361 HYDRATE IV INFUSION ADD-ON: CPT | Performed by: EMERGENCY MEDICINE

## 2020-04-10 PROCEDURE — 96375 TX/PRO/DX INJ NEW DRUG ADDON: CPT | Performed by: EMERGENCY MEDICINE

## 2020-04-10 PROCEDURE — 27210794 ZZH OR GENERAL SUPPLY STERILE: Performed by: INTERNAL MEDICINE

## 2020-04-10 PROCEDURE — 25800030 ZZH RX IP 258 OP 636: Performed by: STUDENT IN AN ORGANIZED HEALTH CARE EDUCATION/TRAINING PROGRAM

## 2020-04-10 PROCEDURE — 00000146 ZZHCL STATISTIC GLUCOSE BY METER IP

## 2020-04-10 PROCEDURE — 85025 COMPLETE CBC W/AUTO DIFF WBC: CPT | Performed by: EMERGENCY MEDICINE

## 2020-04-10 PROCEDURE — 37000009 ZZH ANESTHESIA TECHNICAL FEE, EACH ADDTL 15 MIN: Performed by: INTERNAL MEDICINE

## 2020-04-10 PROCEDURE — 25000128 H RX IP 250 OP 636: Performed by: STUDENT IN AN ORGANIZED HEALTH CARE EDUCATION/TRAINING PROGRAM

## 2020-04-10 PROCEDURE — 25800030 ZZH RX IP 258 OP 636: Performed by: EMERGENCY MEDICINE

## 2020-04-10 PROCEDURE — 36000053 ZZH SURGERY LEVEL 2 EA 15 ADDTL MIN - UMMC: Performed by: INTERNAL MEDICINE

## 2020-04-10 PROCEDURE — 25000125 ZZHC RX 250: Performed by: STUDENT IN AN ORGANIZED HEALTH CARE EDUCATION/TRAINING PROGRAM

## 2020-04-10 PROCEDURE — 36000055 ZZH SURGERY LEVEL 2 W FLUORO 1ST 30 MIN - UMMC: Performed by: INTERNAL MEDICINE

## 2020-04-10 PROCEDURE — 37000008 ZZH ANESTHESIA TECHNICAL FEE, 1ST 30 MIN: Performed by: INTERNAL MEDICINE

## 2020-04-10 PROCEDURE — 25000128 H RX IP 250 OP 636: Performed by: EMERGENCY MEDICINE

## 2020-04-10 PROCEDURE — C9113 INJ PANTOPRAZOLE SODIUM, VIA: HCPCS | Performed by: EMERGENCY MEDICINE

## 2020-04-10 PROCEDURE — 88305 TISSUE EXAM BY PATHOLOGIST: CPT | Performed by: INTERNAL MEDICINE

## 2020-04-10 PROCEDURE — 88312 SPECIAL STAINS GROUP 1: CPT | Performed by: INTERNAL MEDICINE

## 2020-04-10 PROCEDURE — 99284 EMERGENCY DEPT VISIT MOD MDM: CPT | Mod: 25 | Performed by: EMERGENCY MEDICINE

## 2020-04-10 PROCEDURE — 99284 EMERGENCY DEPT VISIT MOD MDM: CPT | Mod: Z6 | Performed by: EMERGENCY MEDICINE

## 2020-04-10 RX ORDER — FENTANYL CITRATE 50 UG/ML
INJECTION, SOLUTION INTRAMUSCULAR; INTRAVENOUS PRN
Status: DISCONTINUED | OUTPATIENT
Start: 2020-04-10 | End: 2020-04-10

## 2020-04-10 RX ORDER — SODIUM CHLORIDE, SODIUM LACTATE, POTASSIUM CHLORIDE, CALCIUM CHLORIDE 600; 310; 30; 20 MG/100ML; MG/100ML; MG/100ML; MG/100ML
INJECTION, SOLUTION INTRAVENOUS CONTINUOUS PRN
Status: DISCONTINUED | OUTPATIENT
Start: 2020-04-10 | End: 2020-04-10

## 2020-04-10 RX ORDER — ESMOLOL HYDROCHLORIDE 10 MG/ML
INJECTION INTRAVENOUS PRN
Status: DISCONTINUED | OUTPATIENT
Start: 2020-04-10 | End: 2020-04-10

## 2020-04-10 RX ORDER — FLUMAZENIL 0.1 MG/ML
0.2 INJECTION, SOLUTION INTRAVENOUS
Status: CANCELLED | OUTPATIENT
Start: 2020-04-10 | End: 2020-04-11

## 2020-04-10 RX ORDER — PROPOFOL 10 MG/ML
INJECTION, EMULSION INTRAVENOUS PRN
Status: DISCONTINUED | OUTPATIENT
Start: 2020-04-10 | End: 2020-04-10

## 2020-04-10 RX ORDER — LIDOCAINE HYDROCHLORIDE 20 MG/ML
INJECTION, SOLUTION INFILTRATION; PERINEURAL PRN
Status: DISCONTINUED | OUTPATIENT
Start: 2020-04-10 | End: 2020-04-10

## 2020-04-10 RX ORDER — NALOXONE HYDROCHLORIDE 0.4 MG/ML
.1-.4 INJECTION, SOLUTION INTRAMUSCULAR; INTRAVENOUS; SUBCUTANEOUS
Status: CANCELLED | OUTPATIENT
Start: 2020-04-10 | End: 2020-04-11

## 2020-04-10 RX ORDER — ONDANSETRON 2 MG/ML
INJECTION INTRAMUSCULAR; INTRAVENOUS PRN
Status: DISCONTINUED | OUTPATIENT
Start: 2020-04-10 | End: 2020-04-10

## 2020-04-10 RX ADMIN — SODIUM CHLORIDE, POTASSIUM CHLORIDE, SODIUM LACTATE AND CALCIUM CHLORIDE: 600; 310; 30; 20 INJECTION, SOLUTION INTRAVENOUS at 18:13

## 2020-04-10 RX ADMIN — PANTOPRAZOLE SODIUM 40 MG: 40 INJECTION, POWDER, FOR SOLUTION INTRAVENOUS at 18:00

## 2020-04-10 RX ADMIN — PROPOFOL 40 MG: 10 INJECTION, EMULSION INTRAVENOUS at 18:30

## 2020-04-10 RX ADMIN — ONDANSETRON 4 MG: 2 INJECTION INTRAMUSCULAR; INTRAVENOUS at 19:17

## 2020-04-10 RX ADMIN — FENTANYL CITRATE 50 MCG: 50 INJECTION, SOLUTION INTRAMUSCULAR; INTRAVENOUS at 18:25

## 2020-04-10 RX ADMIN — FENTANYL CITRATE 50 MCG: 50 INJECTION, SOLUTION INTRAMUSCULAR; INTRAVENOUS at 18:51

## 2020-04-10 RX ADMIN — SUGAMMADEX 200 MG: 100 INJECTION, SOLUTION INTRAVENOUS at 19:05

## 2020-04-10 RX ADMIN — PROPOFOL 30 MG: 10 INJECTION, EMULSION INTRAVENOUS at 18:36

## 2020-04-10 RX ADMIN — PROPOFOL 110 MG: 10 INJECTION, EMULSION INTRAVENOUS at 18:25

## 2020-04-10 RX ADMIN — ROCURONIUM BROMIDE 60 MG: 10 INJECTION INTRAVENOUS at 18:25

## 2020-04-10 RX ADMIN — ESMOLOL HYDROCHLORIDE 20 MG: 10 INJECTION, SOLUTION INTRAVENOUS at 19:02

## 2020-04-10 RX ADMIN — LIDOCAINE HYDROCHLORIDE 60 MG: 20 INJECTION, SOLUTION INFILTRATION; PERINEURAL at 18:25

## 2020-04-10 RX ADMIN — GLUCAGON HYDROCHLORIDE 1 MG: 1 INJECTION, POWDER, FOR SOLUTION INTRAMUSCULAR; INTRAVENOUS; SUBCUTANEOUS at 15:47

## 2020-04-10 RX ADMIN — PHENYLEPHRINE HYDROCHLORIDE 100 MCG: 10 INJECTION INTRAVENOUS at 18:35

## 2020-04-10 RX ADMIN — SODIUM CHLORIDE 500 ML: 9 INJECTION, SOLUTION INTRAVENOUS at 15:47

## 2020-04-10 RX ADMIN — FENTANYL CITRATE 50 MCG: 50 INJECTION, SOLUTION INTRAMUSCULAR; INTRAVENOUS at 18:42

## 2020-04-10 ASSESSMENT — ENCOUNTER SYMPTOMS
SHORTNESS OF BREATH: 0
DIARRHEA: 0
FEVER: 0
COLOR CHANGE: 0
COUGH: 1
RHINORRHEA: 1
ABDOMINAL PAIN: 0
SEIZURES: 1

## 2020-04-10 ASSESSMENT — LIFESTYLE VARIABLES: TOBACCO_USE: 1

## 2020-04-10 ASSESSMENT — COPD QUESTIONNAIRES: COPD: 1

## 2020-04-10 NOTE — ED AVS SNAPSHOT
[facility], [place], Emergency Department  [address] [place] [id number]  Phone:  [phone]                                    Sonya Foote   MRN: [id number]    Department:  [facility], Emergency Department   Date of Visit:  4/10/2020           After Visit Summary Signature Page    I have received my discharge instructions, and my questions have been answered. I have discussed any challenges I see with this plan with the nurse or doctor.    ..........................................................................................................................................  Patient/Patient Representative Signature      ..........................................................................................................................................  Patient Representative Print Name and Relationship to Patient    ..................................................               ................................................  Date                                   Time    ..........................................................................................................................................  Reviewed by Signature/Title    ...................................................              ..............................................  Date                                               Time          22EPIC Rev 08/18

## 2020-04-10 NOTE — PROGRESS NOTES
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    Pt was eating sausage in AM and reports that she did not have any liquids to drink and her food was not cut up. She thinks a piece of sausage got stuck in her throat near sternal notch. She was given liquids and thickened liquids at nursing home but these came back up. She denies vomiting solid contents. She denies any SOB, cough, fevers or chills recently. She is coming from a nursing home that has COVID + patients, but these patients have been isolated from her and she denies symptoms.     She denies odynophagia at baseline but says her throat is sore near the site that she feels is impacted. She denies reflux and abdominal pain. Denies change in stool habits.    Objective:  Vitals and Labs reviewed  Gen: sitting in bed, NAD, has vomit bag filled with saliva  HEENT: no scleral injection, MMM  Lungs: CTAB  Heart: regular without mrg  Abd: obese, soft, nontender, normoactive  LE: bilateral BKA, no edema, warm    Assessment:  Ms Foote's presentation is concerning for esophageal impaction.     She has a hx of food impactions. Her last EGD was in 2018 and she had a benign stricture that was dilated to 18 mm using a balloon.     Plan:  -pt was consented  -plan for EGD in OR under anesthesia  -keep NPO until EGD

## 2020-04-10 NOTE — ANESTHESIA PREPROCEDURE EVALUATION
Anesthesia Pre-Procedure Evaluation    Patient: Sonya Foote   MRN:     [id number] Gender:   female   Age:    70 year old :      1950        Preoperative Diagnosis: Food impaction of esophagus, initial encounter [T18.128A]   Procedure(s):  ESOPHAGOGASTRODUODENOSCOPY (EGD)     LABS:  CBC:   Lab Results   Component Value Date    WBC 11.0 04/10/2020    WBC 12.1 (H) 2019    HGB 12.1 04/10/2020    HGB 11.5 (L) 10/23/2019    HCT 36.7 04/10/2020    HCT 34.3 (L) 2019     04/10/2020     10/23/2019     BMP:   Lab Results   Component Value Date     04/10/2020     2019    POTASSIUM 3.7 04/10/2020    POTASSIUM 4.1 10/31/2019    CHLORIDE 108 04/10/2020    CHLORIDE 107 2019    CO2 27 04/10/2020    CO2 26 2019    BUN 12 04/10/2020    BUN 9 2019    CR 0.51 (L) 04/10/2020    CR 0.61 10/31/2019    GLC 81 04/10/2020    GLC 78 10/23/2019     COAGS:   Lab Results   Component Value Date    PTT 32 2016    INR 1.10 2019    FIBR 786 (H) 2016     POC:   Lab Results   Component Value Date     (H) 10/31/2019     OTHER:   Lab Results   Component Value Date    PH 7.40 2015    LACT 1.0 2019    A1C 5.0 2019    CAROL 9.6 04/10/2020    PHOS 3.2 10/08/2017    MAG 1.9 2018    ALBUMIN 2.9 (L) 2019    PROTTOTAL 7.6 2019    ALT 68 (H) 2019    AST 50 (H) 2019    ALKPHOS 205 (H) 2019    BILITOTAL <0.1 (L) 2019    LIPASE 129 2018    ALEX 32 2016    TSH 1.52 2019    T4 0.88 2013    .0 (H) 2016    SED 72 (H) 2015        Preop Vitals    BP Readings from Last 3 Encounters:   04/10/20 (!) 144/82   20 130/78   20 124/72    Pulse Readings from Last 3 Encounters:   04/10/20 85   20 75   20 75      Resp Readings from Last 3 Encounters:   04/10/20 16   20 16   02/15/20 13    SpO2 Readings from Last 3 Encounters:   04/10/20 100%   20 97%  "  03/04/20 95%      Temp Readings from Last 1 Encounters:   04/10/20 36.4  C (97.6  F) (Oral)    Ht Readings from Last 1 Encounters:   03/12/20 1.092 m (3' 7\")      Wt Readings from Last 1 Encounters:   03/12/20 56.7 kg (125 lb)    Estimated body mass index is 47.53 kg/m  as calculated from the following:    Height as of 3/12/20: 1.092 m (3' 7\").    Weight as of 3/12/20: 56.7 kg (125 lb).     LDA:  Peripheral IV 06/14/19 Right;Anterior Upper forearm (Active)   Number of days: 301       Peripheral IV 04/10/20 Right (Active)   Number of days: 0       ETT (Active)   Number of days: 0       Suprapubic Catheter Latex 16 fr (Active)   Number of days: 162        Past Medical History:   Diagnosis Date     Adjustment disorder with depressed mood 9/16/2009     BPPV (benign paroxysmal positional vertigo)      Central retinal artery occlusion 2006    Left eye blindness     Chronic systolic congestive heart failure (H) 03/08/2016    EF 35-40% 2016. 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 No focal neurological deficits that are new on serial exams during hospitalization. Clinical presentation most c/w TIA based on patient's initial report of symptoms at home prior to presentation.     TIA (transient ischemic attack) 05/2008    altered level of consciousness and increasing left-sided weakness.      Past Surgical History:   Procedure Laterality Date     AMPUTATE LEG ABOVE KNEE Left 6/11/2016    Procedure: AMPUTATE LEG ABOVE KNEE;  Surgeon: Mello Rodriguez MD;  [facility]: [facility]     AMPUTATE LEG BELOW KNEE Right 11/7/2016    Procedure: AMPUTATE LEG BELOW KNEE;  Surgeon: " Savannah Durant MD;  [facility]: [facility]     AMPUTATE REVISION STUMP LOWER EXTREMITY Right 11/11/2016    Procedure: AMPUTATE REVISION STUMP LOWER EXTREMITY;  Surgeon: Savannah Durant MD;  [facility]     AMPUTATE REVISION STUMP LOWER EXTREMITY Right 11/16/2016    Procedure: AMPUTATE REVISION STUMP LOWER EXTREMITY;  Surgeon: Savannah Durant MD;  [facility]     AMPUTATE TOE(S) Right 1/5/2016    Procedure: AMPUTATE TOE(S);  Surgeon: Mello Gaines DPM;  Location: [facility]     ANGIOGRAM Bilateral 11/21/2014    Procedure: ANGIOGRAM;  Surgeon: Savannah Durant MD;  [facility]     ANGIOGRAM Left 1/16/2015    Procedure: ANGIOGRAM;  Surgeon: Savannah Durant MD;  [facility]     ANGIOGRAM Bilateral 9/14/2015    Procedure: ANGIOGRAM;  Surgeon: Savannah Durant MD;  [facility]     ANGIOGRAM Left 10/12/2015    Procedure: ANGIOGRAM;  Surgeon: Savannah Durant MD;  [facility]     ANGIOGRAM Right 6/6/2016    Procedure: ANGIOGRAM;  Surgeon: Savannah Durant MD;  [facility]     ANGIOPLASTY Right 6/6/2016    Procedure: ANGIOPLASTY;  Surgeon: Savannah Durant MD;  [facility]     APPENDECTOMY       BREAST SURGERY      right breast bx (benign)     CATARACT IOL, RT/LT Right      CHOLECYSTECTOMY       COLONOSCOPY N/A 8/25/2014    Procedure: COLONOSCOPY;  Surgeon: Mello Ferrer MD;  [facility]     COLONOSCOPY WITH CO2 INSUFFLATION N/A 8/20/2014    Procedure: COLONOSCOPY WITH CO2 INSUFFLATION;  Surgeon: Duane, William Charles, MD;  Location: [facility]     CYSTOSCOPY, INTRAVESICAL INJECTION N/A 10/31/2019    Procedure: CYSTOSCOPY, BOTOX INJECTION, Suprapubic Catheter Exchange;  Surgeon: Loki Gordon MD;  [facility]     CYSTOSTOMY, INSERT TUBE SUPRAPUBIC, COMBINED N/A 1/16/2018    Procedure: COMBINED CYSTOSTOMY, INSERT TUBE SUPRAPUBIC;  Cystoscopy, Intraoperative Ultrasound, Suprapubic Tube Placement;  Surgeon: Keanu Dawson MD;  [facility]     ENDARTERECTOMY FEMORAL  5/23/2014    Procedure:  ENDARTERECTOMY FEMORAL;  Surgeon: Jason Joshi MD;  [facility]     ESOPHAGOSCOPY, GASTROSCOPY, DUODENOSCOPY (EGD), COMBINED  12/14/2012    Procedure: COMBINED ESOPHAGOSCOPY, GASTROSCOPY, DUODENOSCOPY (EGD), BIOPSY SINGLE OR MULTIPLE;  ESOPHAGOSCOPY, GASTROSCOPY, DUODENOSCOPY (EGD), DILATATION ;  Surgeon: Elizabeth Stevenson MD;  Location:  GI     ESOPHAGOSCOPY, GASTROSCOPY, DUODENOSCOPY (EGD), COMBINED  12/31/2013    Procedure: COMBINED ESOPHAGOSCOPY, GASTROSCOPY, DUODENOSCOPY (EGD), BIOPSY SINGLE OR MULTIPLE;;  Surgeon: Clemente Lopez MD;  Location:  GI     ESOPHAGOSCOPY, GASTROSCOPY, DUODENOSCOPY (EGD), COMBINED  4/1/2014    Procedure: COMBINED ESOPHAGOSCOPY, GASTROSCOPY, DUODENOSCOPY (EGD);;  Surgeon: Clemente Lopez MD;  Location:  GI     ESOPHAGOSCOPY, GASTROSCOPY, DUODENOSCOPY (EGD), COMBINED  6/28/2014    Procedure: COMBINED ESOPHAGOSCOPY, GASTROSCOPY, DUODENOSCOPY (EGD);  Surgeon: Clemente Lopez MD;  Location:  GI     ESOPHAGOSCOPY, GASTROSCOPY, DUODENOSCOPY (EGD), COMBINED N/A 8/20/2014    Procedure: COMBINED ESOPHAGOSCOPY, GASTROSCOPY, DUODENOSCOPY (EGD), BIOPSY SINGLE OR MULTIPLE;  Surgeon: Duane, William Charles, MD;  Location:  OR     ESOPHAGOSCOPY, GASTROSCOPY, DUODENOSCOPY (EGD), COMBINED N/A 8/22/2014    Procedure: COMBINED ESOPHAGOSCOPY, GASTROSCOPY, DUODENOSCOPY (EGD), BIOPSY SINGLE OR MULTIPLE;  Surgeon: Mello Ferrer MD;  Location:  GI     ESOPHAGOSCOPY, GASTROSCOPY, DUODENOSCOPY (EGD), COMBINED N/A 10/2/2014    Procedure: COMBINED ESOPHAGOSCOPY, GASTROSCOPY, DUODENOSCOPY (EGD), BIOPSY SINGLE OR MULTIPLE;  Surgeon: Remy Haskins MD;  Location:  GI     ESOPHAGOSCOPY, GASTROSCOPY, DUODENOSCOPY (EGD), COMBINED Left 12/15/2014    Procedure: COMBINED ESOPHAGOSCOPY, GASTROSCOPY, DUODENOSCOPY (EGD), BIOPSY SINGLE OR MULTIPLE;  Surgeon: Remy Haskins MD;  Location:  GI     ESOPHAGOSCOPY, GASTROSCOPY, DUODENOSCOPY (EGD), COMBINED N/A 2/25/2015     Procedure: COMBINED ENDOSCOPIC ULTRASOUND, ESOPHAGOSCOPY, GASTROSCOPY, DUODENOSCOPY (EGD), FINE NEEDLE ASPIRATE/BIOPSY;  Surgeon: Clemente Lugo MD;  [facility]     ESOPHAGOSCOPY, GASTROSCOPY, DUODENOSCOPY (EGD), COMBINED Left 2/25/2015    Procedure: COMBINED ESOPHAGOSCOPY, GASTROSCOPY, DUODENOSCOPY (EGD), BIOPSY SINGLE OR MULTIPLE;  Surgeon: Clemente Lugo MD;  [facility]     ESOPHAGOSCOPY, GASTROSCOPY, DUODENOSCOPY (EGD), COMBINED N/A 9/25/2016    Procedure: COMBINED ESOPHAGOSCOPY, GASTROSCOPY, DUODENOSCOPY (EGD);  Surgeon: Aziza Patiño MD;  [facility]     ESOPHAGOSCOPY, GASTROSCOPY, DUODENOSCOPY (EGD), COMBINED N/A 1/18/2017    Procedure: COMBINED ESOPHAGOSCOPY, GASTROSCOPY, DUODENOSCOPY (EGD), BIOPSY SINGLE OR MULTIPLE;  Surgeon: Clemente Lopez MD;  [facility]     ESOPHAGOSCOPY, GASTROSCOPY, DUODENOSCOPY (EGD), COMBINED N/A 11/26/2017    Procedure: COMBINED ESOPHAGOSCOPY, GASTROSCOPY, DUODENOSCOPY (EGD), REMOVE FOREIGN BODY;  Esophagogastroduodenoscopy with foreign body extraction  ;  Surgeon: Herberth Castrejon MD;  [facility]     ESOPHAGOSCOPY, GASTROSCOPY, DUODENOSCOPY (EGD), COMBINED N/A 11/26/2017    Procedure: COMBINED ESOPHAGOSCOPY, GASTROSCOPY, DUODENOSCOPY (EGD), REMOVE FOREIGN BODY;;  Surgeon: Herberth Castrejon MD;  [facility]     FASCIOTOMY LOWER EXTREMITY Left 6/10/2016    Procedure: FASCIOTOMY LOWER EXTREMITY;  Surgeon: Mello Rodriguez MD;  [facility]     HC CAPSULE ENDOSCOPY N/A 8/25/2014    Procedure: CAPSULE/PILL CAM ENDOSCOPY;  Surgeon: Remy Haskins MD;  [facility]     HC CAPSULE ENDOSCOPY N/A 10/2/2014    Procedure: CAPSULE/PILL CAM ENDOSCOPY;  Surgeon: Remy Haskins MD;  [facility]     ORTHOPEDIC SURGERY      broken wrist repair     SEX TRANSFORMATION SURGERY, MALE TO FEMALE  1974 1974     SINUS SURGERY      cyst removed     TONSILLECTOMY       VASCULAR SURGERY  2006    Left carotid stent     "  Allergies   Allergen Reactions     Bee Venom      Penicillins Anaphylaxis     Other reaction(s): Skin Rash and/or Hives. Tolerated cefepime in 12/2016     Dilantin [Phenytoin] Other (See Comments)     Generic dilantin only per pt     Iodide Hives     09/11/15: Pt states she can use iodine and doesn't have any problems      Iodine-131      Novocaine [Procaine] Hives     Other reaction(s): Skin Rash and/or Hives     Tositumomab         Anesthesia Evaluation     . Pt has had prior anesthetic. Type: General    No history of anesthetic complications          ROS/MED HX    ENT/Pulmonary: Comment: [facility] - uses \"Pocket talker\" to enhance hearing.      (+)sleep apnea, other ENT (Wears corrective lenses.  Cannot see out of left eye.  Limited right eye vision with no peripheral vision. Legally blind. )- tobacco use (Quit 15 years ago.  Smoked 1-2 PPD for 30 years.), Past use Moderate Persistent asthma Last exacerbation: > 1year ago,Treatment: Inhaled steroids and Nebulizer daily,  COPD, uses CPAP , . .    Neurologic:     (+)neuropathy - fingers, seizures last seizure:  [place] 4-5  time per day.  [place]>15 years ago. CVA (Cerebral vascular dz,s/p stent to left carotid artery 2005.) date: 2005, 2007 & 2008 with deficits- Presented with left side weakness and vision loss.    ,     Cardiovascular: Comment: PVD and thrombosis of LLE, s/p left AKA 6/6/2016   PVD RLE, s/p right AKA 11/23/2016.        (+) hypertension--CAD, -past MI (Inferior MI 9/2016.),-stent (s/p COLLEEN to pRCA and mRCA),9/2016   2 Drug Eluting Stent .. Taking blood thinners Pt has received instructions: . CHF (Plavix started September 2016 post stent placement.   ASA daily.) etiology: diastolic heart failure Last EF: 55-60% date: 9/2016 . . :. . Previous cardiac testing Echodate:results:date: results:ECG reviewed date: results:Cath date: results:          METS/Exercise Tolerance: Comment: Patient lives in assisted living.  Is able to dress herself, but does " "need help with showering. 1 - Eating, dressing   Hematologic:     (+) History of blood clots (Left LE  prior to AKA.) pt is not anticoagulated, Anemia, History of Transfusion previous transfusion reaction - \"hives\" 2012, -      Musculoskeletal: Comment: DDD  Chronic low back pain  (+) arthritis (hands),  -       GI/Hepatic: Comment: Chronic dysphagia   Esophageal strictures and previous dilations.  Coming for food impaction    (+) GERD Asymptomatic on medication, appendicitis (s/p appendectomy), cholecystitis/cholelithiasis (s/p cholecystectomy),       Renal/Genitourinary:     (+) Other Renal/ Genitourinary, Frequent UTI's      Endo:     (+) type II DM Last HgA1c: 5.0 date: 9/2016 Not using insulin - not using insulin pump Normal glucose range: 's not previously admitted for DM/DKA .      Psychiatric:     (+) psychiatric history anxiety, depression and other (comment) (Schizoaffective disorder)      Infectious Disease:  - neg infectious disease ROS       Malignancy:      - no malignancy   Other: Comment: Chronic pain from neuropathy and PVD  Intrathecal pump with bupivacaine and fentanyl located in right lower back.   (+) No chance of pregnancy C-spine cleared: N/A, H/O Chronic Pain,H/O chronic opiod use , other significant disability Wheelchair bound                       PHYSICAL EXAM:   Mental Status/Neuro:    Airway: Facies: Challenging  Mallampati: III  Mouth/Opening: Limited  TM distance: > 6 cm  Neck ROM: Full   Respiratory: Auscultation: Decreased BS      CV: Rhythm: Regular  Rate: Age appropriate  Heart: Normal Sounds  Edema: None   Comments:                      Assessment:   ASA SCORE: 3      Smoking Status:  Active Smoker   NPO Status: ELEVATED Aspiration Risk/Unknown     Plan:   Anes. Type:  General   Pre-Medication: None   Induction:  IV (RSI)   Airway: ETT; Oral   Access/Monitoring: PIV   Maintenance: Balanced     Postop Plan:   Postop Pain: Opioids  Postop Sedation/Airway: Not planned     PONV " Management:   Adult Risk Factors: Female, Postop Opioids   Prevention: Ondansetron                   Zora Brewster MD

## 2020-04-10 NOTE — ED PROVIDER NOTES
[place] EMERGENCY DEPARTMENT ([facility])  April 10, 2020  History     Chief Complaint   Patient presents with     Choking     The history is provided by the patient and medical records.     Sonya Foote is a 70 year old transgender patient (male to female) with a medical history significant for critical lower limb ischemia, TIA, chronic systolic hear failure, GI bleed, esophageal candidiasis, HTN, PAD, leg thrombosis, CVAs with resulting dysphagia, carotid artery stent, S/P bilateral AKA, HLD, type 2 diabetes, and a neurogenic bladder who presents to the ED today complaining of something stuck in his throat. Patient reports she was eating a sausage for breakfast at 9:00am this morning when it became stuck.  She reports that while some of the sausage came back up after choking, there is still some sausage lodged in her esophagus. Patient reports that she has tried drinking liquids to help, but they just come right back up. She also reports she cannot swallow her saliva and did not have her dentures in at the time when she was eating sausage this morning.  Patient also noted that she has had this same problem multiple times in the past, with her last episode occurring in 2017. She has required endoscopies in the past for removal of esophageal food boluses and did have dilation of a stricture. Patient reports she has had rhinorrhea and a cough for a few weeks due to allergies. She resides at [facility]. She denies any SOB, fever, chest pain, abdominal pain, or diarrhea.     I have reviewed the Medications, Allergies, Past Medical and Surgical History, and Social History in the [employer] system.  PAST MEDICAL HISTORY:   Past Medical History:   Diagnosis Date     Adjustment disorder with depressed mood 9/16/2009     BPPV (benign paroxysmal positional vertigo)      Central retinal artery occlusion 2006    Left eye blindness     Chronic systolic congestive heart failure (H) 03/08/2016     EF 35-40% 2016. 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 No focal neurological deficits that are new on serial exams during hospitalization. Clinical presentation most c/w TIA based on patient's initial report of symptoms at home prior to presentation.     TIA (transient ischemic attack) 05/2008    altered level of consciousness and increasing left-sided weakness.       PAST SURGICAL HISTORY:   Past Surgical History:   Procedure Laterality Date     AMPUTATE LEG ABOVE KNEE Left 6/11/2016    Procedure: AMPUTATE LEG ABOVE KNEE;  Surgeon: Mello Rodriguez MD;  [facility]     AMPUTATE LEG BELOW KNEE Right 11/7/2016    Procedure: AMPUTATE LEG BELOW KNEE;  Surgeon: Savannah Durant MD;  [facility]     AMPUTATE REVISION STUMP LOWER EXTREMITY Right 11/11/2016    Procedure: AMPUTATE REVISION STUMP LOWER EXTREMITY;  Surgeon: Savannah Durant MD;  [facility]     AMPUTATE REVISION STUMP LOWER EXTREMITY Right 11/16/2016    Procedure: AMPUTATE REVISION STUMP LOWER EXTREMITY;  Surgeon: Savannah Durant MD;  [facility]     AMPUTATE TOE(S) Right 1/5/2016    Procedure: AMPUTATE TOE(S);  Surgeon: Mello Gaines DPM;  Location: [facility]     ANGIOGRAM Bilateral 11/21/2014    Procedure: ANGIOGRAM;  Surgeon: Savannah Durant MD;  [facility]     ANGIOGRAM Left 1/16/2015    Procedure: ANGIOGRAM;  Surgeon: Savannah Durant MD;  [facility]     ANGIOGRAM Bilateral 9/14/2015    Procedure: ANGIOGRAM;  Surgeon: Savannah Durant MD;  [facility]     ANGIOGRAM Left 10/12/2015    Procedure: ANGIOGRAM;  Surgeon: Savannah Durant MD;  [facility]     ANGIOGRAM Right  6/6/2016    Procedure: ANGIOGRAM;  Surgeon: Savannah Durant MD;  [facility]     ANGIOPLASTY Right 6/6/2016    Procedure: ANGIOPLASTY;  Surgeon: Savannah Durant MD;  [facility]     APPENDECTOMY       BREAST SURGERY      right breast bx (benign)     CATARACT IOL, RT/LT Right      CHOLECYSTECTOMY       COLONOSCOPY N/A 8/25/2014    Procedure: COLONOSCOPY;  Surgeon: Mello Ferrer MD;  [facility]     COLONOSCOPY WITH CO2 INSUFFLATION N/A 8/20/2014    Procedure: COLONOSCOPY WITH CO2 INSUFFLATION;  Surgeon: Duane, William Charles, MD;  Location: [facility]     CYSTOSCOPY, INTRAVESICAL INJECTION N/A 10/31/2019    Procedure: CYSTOSCOPY, BOTOX INJECTION, Suprapubic Catheter Exchange;  Surgeon: Loki Gordon MD;  [facility]     CYSTOSTOMY, INSERT TUBE SUPRAPUBIC, COMBINED N/A 1/16/2018    Procedure: COMBINED CYSTOSTOMY, INSERT TUBE SUPRAPUBIC;  Cystoscopy, Intraoperative Ultrasound, Suprapubic Tube Placement;  Surgeon: Keanu Dawson MD;  [facility]     ENDARTERECTOMY FEMORAL  5/23/2014    Procedure: ENDARTERECTOMY FEMORAL;  Surgeon: Jason Joshi MD;  [facility]     ESOPHAGOSCOPY, GASTROSCOPY, DUODENOSCOPY (EGD), COMBINED  12/14/2012    Procedure: COMBINED ESOPHAGOSCOPY, GASTROSCOPY, DUODENOSCOPY (EGD), BIOPSY SINGLE OR MULTIPLE;  ESOPHAGOSCOPY, GASTROSCOPY, DUODENOSCOPY (EGD), DILATATION ;  Surgeon: Elizabeth Stevenson MD;  Location:  GI     ESOPHAGOSCOPY, GASTROSCOPY, DUODENOSCOPY (EGD), COMBINED  12/31/2013    Procedure: COMBINED ESOPHAGOSCOPY, GASTROSCOPY, DUODENOSCOPY (EGD), BIOPSY SINGLE OR MULTIPLE;;  Surgeon: Clemente Lopez MD;  [facility]     ESOPHAGOSCOPY, GASTROSCOPY, DUODENOSCOPY (EGD), COMBINED  4/1/2014    Procedure: COMBINED ESOPHAGOSCOPY, GASTROSCOPY, DUODENOSCOPY (EGD);;  Surgeon: Clemente Lopez MD;  Location:  GI     ESOPHAGOSCOPY, GASTROSCOPY, DUODENOSCOPY (EGD), COMBINED  6/28/2014    Procedure: COMBINED ESOPHAGOSCOPY, GASTROSCOPY, DUODENOSCOPY  (EGD);  Surgeon: Clemente Lopez MD;  [facility]     ESOPHAGOSCOPY, GASTROSCOPY, DUODENOSCOPY (EGD), COMBINED N/A 8/20/2014    Procedure: COMBINED ESOPHAGOSCOPY, GASTROSCOPY, DUODENOSCOPY (EGD), BIOPSY SINGLE OR MULTIPLE;  Surgeon: Duane, William Charles, MD;  [facility]: [facility]     ESOPHAGOSCOPY, GASTROSCOPY, DUODENOSCOPY (EGD), COMBINED N/A 8/22/2014    Procedure: COMBINED ESOPHAGOSCOPY, GASTROSCOPY, DUODENOSCOPY (EGD), BIOPSY SINGLE OR MULTIPLE;  Surgeon: Mello Ferrer MD;  [facility]     ESOPHAGOSCOPY, GASTROSCOPY, DUODENOSCOPY (EGD), COMBINED N/A 10/2/2014    Procedure: COMBINED ESOPHAGOSCOPY, GASTROSCOPY, DUODENOSCOPY (EGD), BIOPSY SINGLE OR MULTIPLE;  Surgeon: Remy Haskins MD;  [facility]     ESOPHAGOSCOPY, GASTROSCOPY, DUODENOSCOPY (EGD), COMBINED Left 12/15/2014    Procedure: COMBINED ESOPHAGOSCOPY, GASTROSCOPY, DUODENOSCOPY (EGD), BIOPSY SINGLE OR MULTIPLE;  Surgeon: Remy Haskins MD;  [facility]     ESOPHAGOSCOPY, GASTROSCOPY, DUODENOSCOPY (EGD), COMBINED N/A 2/25/2015    Procedure: COMBINED ENDOSCOPIC ULTRASOUND, ESOPHAGOSCOPY, GASTROSCOPY, DUODENOSCOPY (EGD), FINE NEEDLE ASPIRATE/BIOPSY;  Surgeon: Clemente Lugo MD;  [facility]     ESOPHAGOSCOPY, GASTROSCOPY, DUODENOSCOPY (EGD), COMBINED Left 2/25/2015    Procedure: COMBINED ESOPHAGOSCOPY, GASTROSCOPY, DUODENOSCOPY (EGD), BIOPSY SINGLE OR MULTIPLE;  Surgeon: Clemente Lugo MD;  [facility]     ESOPHAGOSCOPY, GASTROSCOPY, DUODENOSCOPY (EGD), COMBINED N/A 9/25/2016    Procedure: COMBINED ESOPHAGOSCOPY, GASTROSCOPY, DUODENOSCOPY (EGD);  Surgeon: Aziza Patiño MD;  [facility]     ESOPHAGOSCOPY, GASTROSCOPY, DUODENOSCOPY (EGD), COMBINED N/A 1/18/2017    Procedure: COMBINED ESOPHAGOSCOPY, GASTROSCOPY, DUODENOSCOPY (EGD), BIOPSY SINGLE OR MULTIPLE;  Surgeon: Clemente Lopez MD;  Location:  GI     ESOPHAGOSCOPY, GASTROSCOPY, DUODENOSCOPY (EGD), COMBINED N/A 11/26/2017    Procedure: COMBINED  ESOPHAGOSCOPY, GASTROSCOPY, DUODENOSCOPY (EGD), REMOVE FOREIGN BODY;  Esophagogastroduodenoscopy with foreign body extraction  ;  Surgeon: Herberth Castrejon MD;  [facility]     ESOPHAGOSCOPY, GASTROSCOPY, DUODENOSCOPY (EGD), COMBINED N/A 11/26/2017    Procedure: COMBINED ESOPHAGOSCOPY, GASTROSCOPY, DUODENOSCOPY (EGD), REMOVE FOREIGN BODY;;  Surgeon: Herberth Castrejon MD;  [facility]     FASCIOTOMY LOWER EXTREMITY Left 6/10/2016    Procedure: FASCIOTOMY LOWER EXTREMITY;  Surgeon: Mello Rodriguez MD;  [facility]     HC CAPSULE ENDOSCOPY N/A 8/25/2014    Procedure: CAPSULE/PILL CAM ENDOSCOPY;  Surgeon: Remy Haskins MD;  [facility]     HC CAPSULE ENDOSCOPY N/A 10/2/2014    Procedure: CAPSULE/PILL CAM ENDOSCOPY;  Surgeon: Remy Haskins MD;  [facility]     ORTHOPEDIC SURGERY      broken wrist repair     SEX TRANSFORMATION SURGERY, MALE TO FEMALE  1974 1974     SINUS SURGERY      cyst removed     TONSILLECTOMY       VASCULAR SURGERY  2006    Left carotid stent       Past medical history, past surgical history, medications, and allergies were reviewed with the patient. Additional pertinent items: None    FAMILY HISTORY:   Family History   Problem Relation Age of Onset     Dementia Mother      Glaucoma Mother      Diabetes Mother         may have been type 1, childhood     Coronary Artery Disease Mother         MI     Glaucoma Father      Diabetes Father         may hev been type 1 - ??     Heart Failure Father      Cancer Maternal Aunt         leukemia     Schizophrenia Brother      Depression Brother      Suicide Sister      Depression Sister      Diabetes Sister      Cancer Maternal Aunt         ovarian     Glaucoma Maternal Grandmother      Diabetes Maternal Grandmother      Glaucoma Maternal Grandfather      Diabetes Maternal Grandfather      Glaucoma Paternal Grandmother      Diabetes Paternal Grandmother      Glaucoma Paternal Grandfather      Diabetes Paternal  Grandfather      Breast Cancer Sister      Cerebrovascular Disease Brother      Colon Cancer No family hx of      Macular Degeneration No family hx of        SOCIAL HISTORY:   Social History     Tobacco Use     Smoking status: Current Every Day Smoker     Packs/day: 0.25     Years: 30.00     Pack years: 7.50     Types: Cigarettes, Cigars     Smokeless tobacco: Never Used     Tobacco comment: about 5-10 cigarettes per day.   Substance Use Topics     Alcohol use: No     Alcohol/week: 0.0 standard drinks     Social history was reviewed with the patient. Additional pertinent items: None      Patient's Medications   New Prescriptions    No medications on file   Previous Medications    ACETAMINOPHEN PO    Take 1,000 mg by mouth every 6 hours as needed for pain Not to exceed 4000 mg/day    ADVAIR DISKUS 250-50 MCG/DOSE DISKUS INHALER    Inhale 1 puff into the lungs 2 times daily    ALBUTEROL (PROVENTIL) (2.5 MG/3ML) 0.083% NEB SOLUTION    INHALE 1 VIAL VIA NEBULIZER EVERY 6 HOURS AS NEEDED    ALENDRONATE (FOSAMAX) 70 MG TABLET    Take 1 tablet (70 mg) by mouth with 8oz water every 7 days 30 minutes before breakfast and remain upright during this time.    ASPIRIN EC 81 MG EC TABLET    Take 1 tablet (81 mg) by mouth daily    ATORVASTATIN (LIPITOR) 40 MG TABLET    TAKE 1 TAB BY MOUTH ONCE DAILY    BLOOD GLUCOSE MONITORING (ONE TOUCH ULTRA 2) METER DEVICE KIT    Use to test blood sugars 3 times daily or as directed.    BLOOD GLUCOSE MONITORING (ONE TOUCH ULTRA) TEST STRIP    Use to test blood sugars 3 times daily or as directed.    BLOOD GLUCOSE MONITORING (ONE TOUCH ULTRASOFT) LANCETS    Use to test blood sugar 3 times daily or as directed.    BRIMONIDINE (ALPHAGAN) 0.2 % OPHTHALMIC SOLUTION    Place 1 drop into the right eye 2 times daily    CAPSAICIN-MENTHOL-METHYL SAL 0.025-1-12 % EXTERNAL CREAM    Apply 1 Application topically daily as needed (topical pain)    CHOLECALCIFEROL (VITAMIN D) 2000 UNITS TABLET    Take 2,000  Units by mouth daily.    CLOTRIMAZOLE (LOTRIMIN) 1 % CREAM    INSERT 1 APPLICATORFUL VAGINALLY TWICE A DAY FOR VAGINAL PAIN    CLOTRIMAZOLE (LOTRIMIN) 1 % EXTERNAL CREAM    Apply topically 2 times daily    DICLOFENAC (VOLTAREN) 1 % GEL TOPICAL GEL    Apply 2 g topically 4 times daily to hands    DORZOLAMIDE (TRUSOPT) 2 % OPHTHALMIC SOLUTION    Place 1 drop into the right eye 2 times daily    ESCITALOPRAM (LEXAPRO) 10 MG TABLET    TAKE 1 TAB BY MOUTH ONCE DAILY    FERROUS SULFATE (IRON) 325 (65 FE) MG TABLET    Take 1 tablet (325 mg) by mouth 2 times daily With meals    FLUTICASONE (FLONASE) 50 MCG/ACT NASAL SPRAY    Spray 1 spray into both nostrils daily    FLUTICASONE (FLONASE) 50 MCG/ACT NASAL SPRAY    Spray 1 spray into both nostrils daily    LACTULOSE (CHRONULAC) 10 GM/15ML SOLUTION    Take 20 g by mouth as needed for constipation    LATANOPROST (XALATAN) 0.005 % OPHTHALMIC SOLUTION    Place 1 drop into both eyes At Bedtime    LEVETIRACETAM (KEPPRA) 500 MG TABLET    TAKE 1 TAB BY MOUTH TWICE A DAY    LIDOCAINE (LIDODERM) 5 % PATCH    APPLY 1 PATCHES TO PAINFUL AREA AT ONCE FOR UP TO 12 HOURS WITHIN A 24 HOUR PERIOD REMOVE AFTER 12 HOURS    LISINOPRIL (PRINIVIL/ZESTRIL) 20 MG TABLET    TAKE 1 TAB BY MOUTH ONCE DAILY    LORATADINE (CLARITIN) 10 MG TABLET    Take 1 tablet (10 mg) by mouth daily    METFORMIN (GLUCOPHAGE) 500 MG TABLET    TAKE 1 TAB BY MOUTH IN THE EVENING WITH DINNER    METOPROLOL SUCCINATE ER (TOPROL-XL) 50 MG 24 HR TABLET    TAKE 1 TAB BY MOUTH ONCE DAILY    NICOTINE (NICODERM CQ) 14 MG/24HR 24 HR PATCH    Place 1 patch onto the skin every 24 hours    NYSTATIN (MYCOSTATIN) 237758 UNIT/ML SUSPENSION    Take 5 mLs (500,000 Units) by mouth 4 times daily - swish and spit for 7 days    ORDER FOR DME    Equipment being ordered: Glucerna daily shakes with each meal    ORDER FOR DME    Equipment being ordered: Nebulizer and tubing supplies    ORDER FOR DME    Hospital bed with side rails    ORDER FOR DME     Full electric hospital bed with half rails    Dx: T37692, I110, J449  Length of need: lifetime    ORDER FOR DME    Wheel Chair Cushion: 18 x 18 inch Roho cushion    ORDER FOR DME    Equipment being ordered: bandage tape, prefer paper    ORDER FOR DME    Equipment being ordered: Power Wheel Chair    ORDER FOR DME    Equipment being ordered: lift recliner    ORDER FOR DME    Equipment being ordered: Prosthetic legs    ORDER FOR DME    Equipment being ordered: air pressure mattress    ORDER FOR DME    autoPAP 12-15 cmH20    OXYCODONE (ROXICODONE) 5 MG TABLET    TAKE 1 TABLET BY MOUTH EVERY 6 HOURS AS NEEDED    PANTOPRAZOLE (PROTONIX) 40 MG EC TABLET    TAKE 1 TAB BY MOUTH ONCE DAILY    PHENYTOIN (DILANTIN) 100 MG CAPSULE    TAKE 2 CAPS (200MG) BY MOUTH TWICE A DAY    POLYETHYLENE GLYCOL (MIRALAX/GLYCOLAX) PACKET    Take 17 g by mouth daily Dissolved in water or juice     PREGABALIN (LYRICA) 75 MG CAPSULE    Take 150 mg by mouth 3 times daily    RISPERIDONE (RISPERDAL) 0.5 MG TABLET    TAKE 1 TAB BY MOUTH AT BEDTIME    SENNOSIDES (SENOKOT) 8.6 MG TABLET    Take 1 tablet by mouth 2 times daily     SOLIFENACIN (VESICARE) 10 MG TABLET    TAKE 1 TAB BY MOUTH ONCE DAILY    SUCRALFATE (CARAFATE) 1 GM TABLET    TAKE 1 TAB BY MOUTH FOUR TIMES DAILY. MAY DISSOLVE IN 10ML WATER ISNECESSARY    TRAZODONE (DESYREL) 150 MG TABLET    TAKE 1 TAB BY MOUTH AT BEDTIME    UMECLIDINIUM (INCRUSE ELLIPTA) 62.5 MCG/INH INHALER    Inhale 1 puff into the lungs daily    VENTOLIN  (90 BASE) MCG/ACT INHALER    Inhale 2 puffs into the lungs every 6 hours as needed for shortness of breath / dyspnea or wheezing   Modified Medications    No medications on file   Discontinued Medications    No medications on file          Allergies   Allergen Reactions     Bee Venom      Penicillins Anaphylaxis     Other reaction(s): Skin Rash and/or Hives. Tolerated cefepime in 12/2016     Dilantin [Phenytoin] Other (See Comments)     Generic dilantin only per  pt     Iodide Hives     09/11/15: Pt states she can use iodine and doesn't have any problems      Iodine-131      Novocaine [Procaine] Hives     Other reaction(s): Skin Rash and/or Hives     Tositumomab         Review of Systems   Constitutional: Negative for fever.   HENT: Positive for rhinorrhea (Allergies).    Respiratory: Positive for cough (Allergies). Negative for shortness of breath.    Cardiovascular: Negative for chest pain.   Gastrointestinal: Negative for abdominal pain and diarrhea.   Skin: Negative for color change.   All other systems reviewed and are negative.    A complete review of systems was performed with pertinent positives and negatives noted in the HPI, and all other systems negative.    Physical Exam   BP: (!) 140/92  Pulse: 172  Heart Rate: 79  Temp: 97.6  F (36.4  C)  Resp: (!) 106  Height: (with prostethetic legs.)  SpO2: 100 %      Physical Exam  Vitals signs and nursing note reviewed.   Constitutional:       General: She is not in acute distress.     Appearance: She is underweight. She is not diaphoretic.      Comments: Alert, cooperative.  Constantly spitting saliva into an emesis bag. Frequent gagging/retching.   HENT:      Head: Normocephalic and atraumatic.      Mouth/Throat:      Comments: Edentulous.  No sign of bleeding. Frequent spitting of saliva into emesis bag.  Eyes:      Conjunctiva/sclera: Conjunctivae normal.      Pupils: Pupils are equal, round, and reactive to light.   Cardiovascular:      Rate and Rhythm: Normal rate and regular rhythm.      Heart sounds: Normal heart sounds.   Pulmonary:      Effort: Pulmonary effort is normal. No respiratory distress.      Breath sounds: Normal breath sounds. No wheezing or rales.   Abdominal:      General: Bowel sounds are normal. There is no distension.      Palpations: Abdomen is soft.      Tenderness: There is no abdominal tenderness.   Skin:     General: Skin is warm and dry.   Neurological:      Mental Status: She is alert and  oriented to person, place, and time.         ED Course   3:06 PM  The patient was seen and examined by Mariana Blevins MD in Room ED36.        Procedures                           Results for orders placed or performed during the hospital encounter of 04/10/20   CBC with platelets differential     Status: Abnormal   Result Value Ref Range    WBC 11.0 4.0 - 11.0 10e9/L    RBC Count 4.15 3.8 - 5.2 10e12/L    Hemoglobin 12.1 11.7 - 15.7 g/dL    Hematocrit 36.7 35.0 - 47.0 %    MCV 88 78 - 100 fl    MCH 29.2 26.5 - 33.0 pg    MCHC 33.0 31.5 - 36.5 g/dL    RDW 12.7 10.0 - 15.0 %    Platelet Count 291 150 - 450 10e9/L    Diff Method Automated Method     % Neutrophils 77.7 %    % Lymphocytes 12.0 %    % Monocytes 7.4 %    % Eosinophils 2.0 %    % Basophils 0.5 %    % Immature Granulocytes 0.4 %    Nucleated RBCs 0 0 /100    Absolute Neutrophil 8.5 (H) 1.6 - 8.3 10e9/L    Absolute Lymphocytes 1.3 0.8 - 5.3 10e9/L    Absolute Monocytes 0.8 0.0 - 1.3 10e9/L    Absolute Eosinophils 0.2 0.0 - 0.7 10e9/L    Absolute Basophils 0.1 0.0 - 0.2 10e9/L    Abs Immature Granulocytes 0.0 0 - 0.4 10e9/L    Absolute Nucleated RBC 0.0    Basic metabolic panel     Status: Abnormal   Result Value Ref Range    Sodium 140 133 - 144 mmol/L    Potassium 3.7 3.4 - 5.3 mmol/L    Chloride 108 94 - 109 mmol/L    Carbon Dioxide 27 20 - 32 mmol/L    Anion Gap 5 3 - 14 mmol/L    Glucose 81 70 - 99 mg/dL    Urea Nitrogen 12 7 - 30 mg/dL    Creatinine 0.51 (L) 0.52 - 1.04 mg/dL    GFR Estimate >90 >60 mL/min/[1.73_m2]    GFR Estimate If Black >90 >60 mL/min/[1.73_m2]    Calcium 9.6 8.5 - 10.1 mg/dL       Medications   0.9% sodium chloride BOLUS (500 mLs Intravenous New Bag 4/10/20 1547)   glucagon injection 1 mg (1 mg Intravenous Given 4/10/20 7425)             Assessments & Plan (with Medical Decision Making)   Patient presents to the emergency department today with concern about esophageal food bolus.  She has had a sensation of a foreign body lodged  in her throat for the past 6 hours now.  She is unable to tolerate her saliva or fluids such as water, stating that they come right back up.  She is noted to be spitting her saliva into a bag.  Record review shows that she does have a history of esophageal stricture in the past requiring dilation.  Suspect that she likely does have a food bolus.  She is not in any respiratory distress.  We will try a dose of glucagon, and if this is ineffective we will consult GI.    Glucagon was ineffective.  We did call GI who is going to come and see the patient.     Basic labs were drawn, CBC and basic metabolic panel are within normal limits.  GI did come to see the patient and they are planning to take the patient to the operating room for endoscopy to remove likely esophageal food bolus.    Patient did begin to have episodes of spitting up blood tinged sputum.  Likely due to irritation/mechanical trauma from impacted food bolus. Plan to monitor, will continue with plan to go to OR with GI for endoscopy.    I have reviewed the nursing notes.    I have reviewed the findings, diagnosis, plan and need for follow up with the patient.    New Prescriptions    No medications on file       Final diagnoses:   Esophageal obstruction due to food impaction   I, Kg Marie, am serving as a trained medical scribe to document services personally performed by Mariana Blevins MD, based on the provider's statements to me.     IMariana MD, was physically present and have reviewed and verified the accuracy of this note documented by Kg Marie.        4/10/2020   [facility], EMERGENCY DEPARTMENT     Mariana Blevins MD  04/10/20 9685

## 2020-04-10 NOTE — ED NOTES
Bed: ED36  Expected date:   Expected time:   Means of arrival:   Comments:  [facility] [address] 71 M sob

## 2020-04-10 NOTE — TELEPHONE ENCOUNTER
FYI-  Nurse manager Nalini from [facility], called reporting  has a piece of sausage stuck in her throat. Denied breathing problems but pt is unable to swallow or clear her throat. They tried using thickened fluid with no results. She is requesting verbal order from PCP to send pt to ER or get further directives. Verbal approval given by writer to have pt seen at ER. No further cation needed at this time.

## 2020-04-10 NOTE — ED TRIAGE NOTES
Pt c/o sausage stuck in throat. Airway intact, voice normal. Pt was eating breakfast around 0900 and her staff didn't cut her meat small enough. Hx of stroke x3, having dysphasia issues. Pt lives at [facility] ER which has positive covid cases. SOB at baseline d/t COPD, not on O2 or desats on arrival.

## 2020-04-11 LAB
GLUCOSE BLDC GLUCOMTR-MCNC: 71 MG/DL (ref 70–99)
UPPER GI ENDOSCOPY: NORMAL

## 2020-04-11 NOTE — BRIEF OP NOTE
[facility], [place]    Brief Operative Note    Pre-operative diagnosis: Food impaction of esophagus, initial encounter [T18.128A]  Post-operative diagnosis Food impaction    Procedure: Procedure(s):  ESOPHAGOGASTRODUODENOSCOPY (EGD)  Surgeon: Surgeon(s) and Role:     * Yael Cabral MD - Primary  Anesthesia: General   Estimated blood loss:none  Drains: none  Specimens:   ID Type Source Tests Collected by Time Destination   A : esophageal biopsy rule out candida Tissue Other SURGICAL PATHOLOGY EXAM Yael Cabral MD 4/10/2020  6:55 PM      Findings: Large amounts of found in the esophagus. This was removed with two passes of the Cordova net and re-insertion of the scope. The remainder of the food bolus was then pushed into the stomach. The scope was advanced into the stomach and the duodenum. The duodenum was normal. The stomach was had some superficial erosions. Small hiatal hernia. The esophagus was edematous and erythematous. Small spots/patches of whitish yellowish lesions were present which may be candida. Biopsies taken from upper, mid, lower esophagus. No obvious rings. Mild narrowing seen near the GE junction. Will follow up on path results. Continue eating small bites and chewing well.   Complications: None.  Implants: none           '

## 2020-04-11 NOTE — ED NOTES
Bed: ED36  Expected date:   Expected time:   Means of arrival:   Comments:  Hold for patient in OR

## 2020-04-11 NOTE — ANESTHESIA POSTPROCEDURE EVALUATION
Anesthesia POST Procedure Evaluation    Patient: Sonya Foote   MRN:     [id number] Gender:   female   Age:    70 year old :      1950        Preoperative Diagnosis: Food impaction of esophagus, initial encounter [T18.128A]   Procedure(s):  ESOPHAGOGASTRODUODENOSCOPY (EGD)   Postop Comments: No value filed.     Anesthesia Type: General       Disposition: Outpatient   Postop Pain Control: Uneventful            Sign Out: Well controlled pain   PONV: No   Neuro/Psych: Uneventful            Sign Out: Acceptable/Baseline neuro status   Airway/Respiratory: Uneventful            Sign Out: Acceptable/Baseline resp. status   CV/Hemodynamics: Uneventful            Sign Out: Acceptable CV status   Other NRE: NONE   DID A NON-ROUTINE EVENT OCCUR? No    Event details/Postop Comments:  AFter extubation, patient recovered in the OR and transported to the COVID unit in the ED.         Last Anesthesia Record Vitals:  [facility] VITALS  4/10/2020 1920 - 4/10/2020 2020      4/10/2020             Pulse:  78    SpO2:  100 %          Last PACU Vitals:  Vitals Value Taken Time   /78 4/10/2020  8:30 PM   Temp     Pulse 85 4/10/2020  8:30 PM   Resp     SpO2 100 % 4/10/2020  8:43 PM   Temp src     NIBP     Pulse     SpO2     Resp     Temp     Ht Rate     Temp 2     Vitals shown include unvalidated device data.      Electronically Signed By: Zora Brewster MD, April 10, 2020, 8:44 PM

## 2020-04-11 NOTE — ANESTHESIA CARE TRANSFER NOTE
Patient: Sonya Foote    Procedure(s):  ESOPHAGOGASTRODUODENOSCOPY (EGD)    Diagnosis: Food impaction of esophagus, initial encounter [T18.128A]  Diagnosis Additional Information: No value filed.    Anesthesia Type:   General     Note:  Airway :Room Air and Face Mask  Patient transferred to:Emergency Department  Comments: Patient was stable and conversant. Denied pain or nausea. Continued to have cough. Wearing facemask with face-shield during transport.      Vitals: (Last set prior to Anesthesia Care Transfer)    [facility] VITALS  4/10/2020 1915 - 4/10/2020 1945      4/10/2020             Pulse:  75    Ht Rate:  75    Temp:  35.6  C (96.1  F)    SpO2:  100 %                Electronically Signed By: Gena Lopez MD  April 10, 2020  7:45 PM

## 2020-04-11 NOTE — DISCHARGE INSTRUCTIONS
You have been seen in the ER for a piece of food (sausage) stuck in the esophagus.  This has been removed with a scope (camera and instruments).  You should always be sure to eat meals with your dentures in so you can chew it well, and be sure to have the nursing home staff cut the food into very small pieces.  Follow the normal dysphagia (thickened liquids) diet that you were previously advised.

## 2020-04-13 ENCOUNTER — NURSE TRIAGE (OUTPATIENT)
Dept: NURSING | Facility: CLINIC | Age: 70
End: 2020-04-13

## 2020-04-13 NOTE — TELEPHONE ENCOUNTER
"Cristina is the caller -> from [facility] where pt resides.    Status update for pt's PCP:  \"She fell out of bed at 4:30 am today.\"  Pt told nurse \"Fell while repositioning herself.\"  Caller states \"She typically repositions herself.\"  No apparent injury.  Pt would be able to express injury  No new pain.  No bruising at this time.  No blood thinner medications.    Triage disposition typically calls for same-day clinical eval.  However pt does not request eval.  Also, due to no apparent injury combined with pt's coherent ability to express any new symptoms, Facility RN intends to monitor pt on-site for any new symptoms arising from incident.  Will follow up as needed.    Luana Luz   Health Nurse Advisor     Additional Information    Negative: Major bleeding (actively dripping or spurting) that can't be stopped    Negative: Bullet, stabbed by knife or other serious penetrating wound    Negative: Looks like a dislocated joint (crooked or deformed)    Negative: Can't stand (bear weight) or walk    Negative: Sounds like a life-threatening emergency to the triager    Negative: Wound looks infected    Negative: Puncture wound of hip area    Negative: SEVERE pain (e.g., excruciating)    Negative: Skin is split open or gaping (length > 1/2 inch or 12 mm)    Negative: Bleeding won't stop after 10 minutes of direct pressure (using correct technique)    Negative: Dirt in the wound and not removed after 15 minutes of scrubbing    Negative: Sounds like a serious injury to the triager    Negative: Looks infected (e.g., spreading redness, pus, red streak)    Negative: Patient wants to be seen    High-risk adult (e.g., age > 60, osteoporosis, chronic steroid use)    Negative: Injury interferes with work or school    Negative: Large swelling or bruise and size > palm of person's hand    Protocols used: HIP INJURY-A-[place]      "

## 2020-04-15 LAB — COPATH REPORT: NORMAL

## 2020-04-18 NOTE — DISCHARGE INSTRUCTIONS
Visual Rehabilitation Summary  We completed the visual rehabilitation evaluation.   You can now make 4 appointments to see me at [facility] to address our visual rehabilitation goals:  1. Identify the optimal lighting for your home and the optimal sunglasses for managing glare.  2. Identify strategies to make it easier to find things.  3. Identify the best device for spot reading so you can see who your mail is from etc.  4. Learn functions on your new Android phone so it can talk to you and so you can give voice commands to use.  5. The [facility] rehabilitation office will call you to make the appointments.   We called Citlali Dumont at [facility] and asked her to contact you and let you know when she will be out to teach you cane training in the wheelchair. ([phone])    Manuela Mitchell, OTR/L  [phone]  
Respiratory failure with hypoxia

## 2020-04-28 VITALS — BODY MASS INDEX: 28.93 KG/M2 | HEIGHT: 55 IN | WEIGHT: 125 LBS

## 2020-04-28 ASSESSMENT — MIFFLIN-ST. JEOR: SCORE: 733.63

## 2020-04-28 NOTE — PATIENT INSTRUCTIONS
Your BMI is Body mass index is 47.53 kg/m .  Weight management is a personal decision.  If you are interested in exploring weight loss strategies, the following discussion covers the approaches that may be successful. Body mass index (BMI) is one way to tell whether you are at a healthy weight, overweight, or obese. It measures your weight in relation to your height.  A BMI of 18.5 to 24.9 is in the healthy range. A person with a BMI of 25 to 29.9 is considered overweight, and someone with a BMI of 30 or greater is considered obese. More than two-thirds of American adults are considered overweight or obese.  Being overweight or obese increases the risk for further weight gain. Excess weight may lead to heart disease and diabetes.  Creating and following plans for healthy eating and physical activity may help you improve your health.  Weight control is part of healthy lifestyle and includes exercise, emotional health, and healthy eating habits. Careful eating habits lifelong are the mainstay of weight control. Though there are significant health benefits from weight loss, long-term weight loss with diet alone may be very difficult to achieve- studies show long-term success with dietary management in less than 10% of people. Attaining a healthy weight may be especially difficult to achieve in those with severe obesity. In some cases, medications, devices and surgical management might be considered.  What can you do?  If you are overweight or obese and are interested in methods for weight loss, you should discuss this with your provider.     Consider reducing daily calorie intake by 500 calories.     Keep a food journal.     Avoiding skipping meals, consider cutting portions instead.    Diet combined with exercise helps maintain muscle while optimizing fat loss. Strength training is particularly important for building and maintaining muscle mass. Exercise helps reduce stress, increase energy, and improves fitness.  Increasing exercise without diet control, however, may not burn enough calories to loose weight.       Start walking three days a week 10-20 minutes at a time    Work towards walking thirty minutes five days a week     Eventually, increase the speed of your walking for 1-2 minutes at time    In addition, we recommend that you review healthy lifestyles and methods for weight loss available through the National Institutes of Health patient information sites:  http://win.niddk.nih.gov/publications/index.htm    And look into health and wellness programs that may be available through your health insurance provider, employer, local community center, or yoandy club.    Weight management plan: Patient was referred to their PCP to discuss a diet and exercise plan.

## 2020-04-28 NOTE — PROGRESS NOTES
"Sonya Foote is a 70 year old female who is being evaluated via a billable telephone visit.      The patient has been notified of following:     \"This telephone visit will be conducted via a call between you and your physician/provider. We have found that certain health care needs can be provided without the need for a physical exam.  This service lets us provide the care you need with a short phone conversation.  If a prescription is necessary we can send it directly to your pharmacy.  If lab work is needed we can place an order for that and you can then stop by our lab to have the test done at a later time.    Telephone visits are billed at different rates depending on your insurance coverage. During this emergency period, for some insurers they may be billed the same as an in-person visit.  Please reach out to your insurance provider with any questions.    If during the course of the call the physician/provider feels a telephone visit is not appropriate, you will not be charged for this service.\"    Patient has given verbal consent for Telephone visit?  Yes    How would you like to obtain your AVS? Mail a copy        "

## 2020-04-29 ENCOUNTER — VIRTUAL VISIT (OUTPATIENT)
Dept: SLEEP MEDICINE | Facility: CLINIC | Age: 70
End: 2020-04-29
Payer: COMMERCIAL

## 2020-04-29 DIAGNOSIS — G47.33 OSA (OBSTRUCTIVE SLEEP APNEA): ICD-10-CM

## 2020-04-29 DIAGNOSIS — G47.39 COMPLEX SLEEP APNEA SYNDROME: ICD-10-CM

## 2020-04-29 PROCEDURE — 99213 OFFICE O/P EST LOW 20 MIN: CPT | Mod: TEL | Performed by: INTERNAL MEDICINE

## 2020-04-29 NOTE — PROGRESS NOTES
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 Did have Restless Legs Syndrome before amputation but not anymore.     Sleep history:   10/26/2012 - Polysomnography at [facility] -  min; AHI 14; RDI 22; ERIN 5/hr; No PLMs or RBD.    11/16/2012 - Titration Polysomnography at [facility] - Started on CPAP and developed treatment-emergent central apneas. Adaptive Servo-Ventilation titration optimal but did well on CPAP 8 as well (at least REM supine per comments).     Put on CPAP 8 cmH2O.     Initially presented to [facility] 2017 on CPAP 8 cmH20 and elevated AHI 9.7 on download. Not a candidate for ASV due to reduced ejection fraction.     2/26/2017 - Titration Polysomnography at the [facility] (130#)  minutes. CPAP 5 - 12 cmH2O tried. Unacceptable titration due to high residual AHI, however, oxygen desaturations and events were better controlled on CPAP of 10 cmH2O or higher. However AHI was >20 and predominantly obstructive events were scored.     She was changed to CPAP 12 cmH2O.    She received a new machine 7/2019 and lost it due to non-compliance    Echo 11/2019  Global and regional left ventricular function is normal with an EF of 60-65%.  Pulmonary artery systolic pressure is normal.    Study Date: 11/20/2019 (130.0 lbs) Titrated CPAP 5 to 12 cmH2O. Supine REM was seen at 11 cmH20 with fair control of events, but persistent RERAs, snoring, and airflow limitation. Events often appeared periodic, and cheyyne-fang morphology was suggested at times, but periodicity was not well consolidated and circulation times were not delayed.      Recommend treatment of JOSE with Autotitrating PAP therapy with a range of 12 cmH2O to 15 cmH2O    At last visit we elected a trial of bilvevl to see if she would tolerate it better without exacerbating her periodic breathung, her study got cancelled due to Covid19    Nose has been getting congested. She has been using a netti-pot for the past few weeks.   She has to do it at night. Currently 3 times a night. Sudafed also seems to help.    She has flonase but was told she is not due for a refill until May    She has been having problems with some nose bleeds    She has been getting problems in winter ans spring since she moved from [place]    Her nursing home is on lock-down for several weeks    She uses a nasal mask    Total score - Luna: 13 (4/28/2020  9:00 AM)  EUGENIA Total Score: 18      Respironics  Auto-PAP 12 - 15 cmH2O 30 day usage data:    23% of days with > 4 hours of use. 0/30 days with no use.   Average use 210 minutes per day.   Average leak 58.11 LPM.  Average % of night in large leak 28%.    CPAP 90% pressure 15cm.   AHI 8.49 events per hour.    She needs new supplies.       Past medical/surgical history, family history, social history, medications and allergies were reviewed.      Current Outpatient Medications   Medication Sig Dispense Refill     ACETAMINOPHEN PO Take 1,000 mg by mouth every 6 hours as needed for pain Not to exceed 4000 mg/day       ADVAIR DISKUS 250-50 MCG/DOSE diskus inhaler Inhale 1 puff into the lungs 2 times daily 60 Inhaler 11     albuterol (PROVENTIL) (2.5 MG/3ML) 0.083% neb solution INHALE 1 VIAL VIA NEBULIZER EVERY 6 HOURS AS NEEDED 360 mL 11     alendronate (FOSAMAX) 70 MG tablet Take 1 tablet (70 mg) by mouth with 8oz water every 7 days 30 minutes before breakfast and remain upright during this time. 12 tablet 3     aspirin EC 81 MG EC tablet Take 1 tablet (81 mg) by mouth daily 90 tablet 3     atorvastatin (LIPITOR) 40 MG tablet TAKE 1 TAB BY MOUTH ONCE DAILY 30  tablet 11     blood glucose monitoring (ONE TOUCH ULTRA 2) meter device kit Use to test blood sugars 3 times daily or as directed. 1 kit 0     blood glucose monitoring (ONE TOUCH ULTRA) test strip Use to test blood sugars 3 times daily or as directed. 3 Box 3     blood glucose monitoring (ONE TOUCH ULTRASOFT) lancets Use to test blood sugar 3 times daily or as directed. 100 each PRN     brimonidine (ALPHAGAN) 0.2 % ophthalmic solution Place 1 drop into the right eye 2 times daily 1 Bottle 11     capsaicin-menthol-methyl sal 0.025-1-12 % external cream Apply 1 Application topically daily as needed (topical pain) 56.6 g 1     Cholecalciferol (VITAMIN D) 2000 UNITS tablet Take 2,000 Units by mouth daily. 100 tablet 3     clotrimazole (LOTRIMIN) 1 % cream INSERT 1 APPLICATORFUL VAGINALLY TWICE A DAY FOR VAGINAL PAIN 12 g 0     clotrimazole (LOTRIMIN) 1 % external cream Apply topically 2 times daily 12 g 0     diclofenac (VOLTAREN) 1 % GEL topical gel Apply 2 g topically 4 times daily to hands 100 g 11     dorzolamide (TRUSOPT) 2 % ophthalmic solution Place 1 drop into the right eye 2 times daily 1 Bottle 11     escitalopram (LEXAPRO) 10 MG tablet TAKE 1 TAB BY MOUTH ONCE DAILY 30 tablet 11     ferrous sulfate (IRON) 325 (65 FE) MG tablet Take 1 tablet (325 mg) by mouth 2 times daily With meals 60 tablet 2     fluticasone (FLONASE) 50 MCG/ACT nasal spray Spray 1 spray into both nostrils daily 15.8 mL 11     fluticasone (FLONASE) 50 MCG/ACT nasal spray Spray 1 spray into both nostrils daily       lactulose (CHRONULAC) 10 GM/15ML solution Take 20 g by mouth as needed for constipation       latanoprost (XALATAN) 0.005 % ophthalmic solution Place 1 drop into both eyes At Bedtime 2.5 mL 11     levETIRAcetam (KEPPRA) 500 MG tablet TAKE 1 TAB BY MOUTH TWICE A DAY 60 tablet 5     lidocaine (LIDODERM) 5 % patch APPLY 1 PATCHES TO PAINFUL AREA AT ONCE FOR UP TO 12 HOURS WITHIN A 24 HOUR PERIOD REMOVE AFTER 12 HOURS 30 patch 1      lisinopril (PRINIVIL/ZESTRIL) 20 MG tablet TAKE 1 TAB BY MOUTH ONCE DAILY 30 tablet 11     loratadine (CLARITIN) 10 MG tablet Take 1 tablet (10 mg) by mouth daily 30 tablet 1     metFORMIN (GLUCOPHAGE) 500 MG tablet TAKE 1 TAB BY MOUTH IN THE EVENING WITH DINNER 30 tablet 5     metoprolol succinate ER (TOPROL-XL) 50 MG 24 hr tablet TAKE 1 TAB BY MOUTH ONCE DAILY 30 tablet 11     nicotine (NICODERM CQ) 14 MG/24HR 24 hr patch Place 1 patch onto the skin every 24 hours 30 patch 3     nystatin (MYCOSTATIN) 445914 UNIT/ML suspension Take 5 mLs (500,000 Units) by mouth 4 times daily - swish and spit for 7 days 60 mL 1     order for DME autoPAP 12-15 cmH20 1 Device 0     order for DME Equipment being ordered: air pressure mattress 1 Device 0     order for DME Equipment being ordered: Prosthetic legs 2 each 0     order for DME Equipment being ordered: lift recliner 1 Device 0     order for DME Equipment being ordered: Power Wheel Chair 1 Device 0     order for DME Equipment being ordered: bandage tape, prefer paper 1 Package 0     order for DME Full electric hospital bed with half rails    Dx: J97364, I110, J449  Length of need: lifetime 1 Device 0     order for DME Wheel Chair Cushion: 18 x 18 inch Roho cushion 1 Device 0     order for DME Hospital bed with side rails 1 Device 0     order for DME Equipment being ordered: Nebulizer and tubing supplies 1 Units 0     order for DME Equipment being ordered: Glucerna daily shakes with each meal 1 Box 11     oxyCODONE (ROXICODONE) 5 MG tablet TAKE 1 TABLET BY MOUTH EVERY 6 HOURS AS NEEDED  0     pantoprazole (PROTONIX) 40 MG EC tablet TAKE 1 TAB BY MOUTH ONCE DAILY 30 tablet 11     phenytoin (DILANTIN) 100 MG capsule TAKE 2 CAPS (200MG) BY MOUTH TWICE A  capsule 11     polyethylene glycol (MIRALAX/GLYCOLAX) Packet Take 17 g by mouth daily Dissolved in water or juice        pregabalin (LYRICA) 75 MG capsule Take 150 mg by mouth 3 times daily       risperiDONE (RISPERDAL)  0.5 MG tablet TAKE 1 TAB BY MOUTH AT BEDTIME 30 tablet 5     sennosides (SENOKOT) 8.6 MG tablet Take 1 tablet by mouth 2 times daily        solifenacin (VESICARE) 10 MG tablet TAKE 1 TAB BY MOUTH ONCE DAILY 30 tablet 11     sucralfate (CARAFATE) 1 GM tablet TAKE 1 TAB BY MOUTH FOUR TIMES DAILY. MAY DISSOLVE IN 10ML WATER ISNECESSARY 120 tablet 11     traZODone (DESYREL) 150 MG tablet TAKE 1 TAB BY MOUTH AT BEDTIME 30 tablet 11     umeclidinium (INCRUSE ELLIPTA) 62.5 MCG/INH inhaler Inhale 1 puff into the lungs daily 1 Inhaler 6     VENTOLIN  (90 Base) MCG/ACT inhaler Inhale 2 puffs into the lungs every 6 hours as needed for shortness of breath / dyspnea or wheezing 8.5 g 11         Problem List:  Patient Active Problem List    Diagnosis Date Noted     S/P AKA (above knee amputation) bilateral (H) 06/19/2019     Priority: High     s/p left above-the-knee amputation for peripheral vascular disease and thrombosis on 06/06/2016 and a right above-the-knee amputation for peripheral arterial disease on 11/23/2016.        History of CVA (cerebrovascular accident) 06/25/2018     Priority: High     She had 70% stenosis of her left internal carotid artery in 2006, initially presented with left eye blindness due to central retinal artery occlusion. She also had left M1 occlusion. She was treated with left carotid angioplasty and stenting to improve collateral flow to the left MCA territory throught BONIFACIO and left PCA, which has a fetal origin from the left ICA       Coronary artery disease involving native coronary artery of native heart 04/04/2017     Priority: High     -Unstable angina and inferior wall myocardial infarction [facility] 09/2016.  EF by cardiac MRI in June had been 60%. Angiography in 09/2016 with high-grade stenosis of the proximal right coronary artery.  Intracoronary stenting was performed.  Her ejection fraction fell to 35%-40%. There was evidence of LAD distribution akinesis as well as  the inferior wall akinesis on echocardiogram in 12/2016.   -Atypical chest pain on 05/02/18.  Coronary angiography  demonstrated moderate in-stent renarrowing within the right coronary artery which did not appear to be flow limiting with a normal fractional flow reserve.  There was only mild disease in the left coronary system with some calcification and 15%-20% in the proximal LAD and 25%-35% narrowing after the septal .  The mid to distal LAD was normal.  The diagonal had a less than 35% stenosis.  Circumflex in essence comprised 1 multi-branching obtuse marginal with trivial plaque.  Ejection fraction on left ventriculogram was 52%-55% with end-diastolic pressure of 18 mmHg.  Medical therapy was suggested.        Schizoaffective disorder (H) 06/07/2013     Priority: High     Chronic pain syndrome 03/20/2009     Priority: High     Has a fentanyl PUMP    Patient is followed by Allan Casey for ongoing prescription of pain meds  All refills should be approved by this provider, or covering partner.    Maximum quantity per month: 1 month  Clinic visit frequency required: Q 6  months     Controlled substance agreement:  Encounter-Level CSA:     There are no encounter-level csa.        Pain Clinic evaluation in the past: No    DIRE Total Score(s):  No flowsheet data found.    Last [facility] website verification:  6/6/18   [email or web address]       Neurogenic bladder 12/23/2019     Priority: Medium     Added automatically from request for surgery [id number]       Suprapubic catheter (H) 11/04/2019     Priority: Medium     Polypharmacy 10/31/2019     Priority: Medium     Chronic, continuous use of opioids 06/19/2019     Priority: Medium     Tobacco abuse 06/26/2018     Priority: Medium     Migraine headache 03/20/2018     Priority: Medium     Complex sleep apnea syndrome 03/15/2017     Priority: Medium     JOSE (obstructive sleep apnea)-  02/22/2017     Priority: Medium     10/26/2012 - Polysomnography at  [facility] -  min; AHI 14; RDI 22; ERIN 5/hr; No PLMs or RBD.  11/16/2012 - Titration Polysomnography at [facility] - Started on CPAP and developed treatment-emergent central apneas.  Adaptive Servo-Ventilation titration optimal but did well on CPAP 8 as well. Put on CPAP 8 cmH2O.    Has systolic CHF, intrathecal narcotic pump, and treatment-emergent Central Sleep Apnea on CPAP.  Titration Study: 2/26/2017 - (130.0 lbs) CPAP was titrated to a pressure of 12 with an AHI of 38.5.  Time Spent in REM supine at this pressure was 7.0  Titration Study: 11/20/2019 (130.0 lbs)  Supine REM was seen at 11 cmH20 with fair control of events, but persistent RERAs, snoring, and airflow limitation. Events often appeared periodic, and cheyyne-fang morphology was suggested at times, but periodicity was not well consolidated and circulation times were not delayed.         Diabetes mellitus type 2 without retinopathy (H) 12/08/2016     Priority: Medium     Essential hypertension 09/02/2016     Priority: Medium     Stenosis of carotid artery 04/01/2016     Priority: Medium     History of a left carotid stent placement in 2006.    Carotid angiogram 4/2016 showed 80% stenosis of the cervical left carotid artery.  Dr Richardson performed balloon angioplasty with subsequent <25% stenosis.  Carotid US 5/2019: < 50% stenosis to ASTRID and patent left LIC artery stent.       PAD (peripheral artery disease) (H) 09/14/2015     Priority: Medium     S/p bilateral above-knee amputation 2016       Person who has had sex change operation 01/20/2014     Priority: Medium     SEX TRANSFORMATION SURGERY, MALE TO FEMALE 1974       Simple chronic bronchitis (H) 01/01/2013     Priority: Medium     Chronic cough, >75 pack years  Pulmonary function test in the past have shown normal spirometry and normal lung volumes and mildly reduced diffusion capacity.     CT scan 2018 showed no evidence of bronchiectasis or emphysema.        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 The most likely occurrence of a 46,XY karyotype with a female phenotype is Androgen Insensitivity Syndrome (testicular feminizing syndrome), an X-linked recessive disorder caused by a mutation in the androgen receptor gene; it may also be due  to XY pure gonadal dysgenesis.          Iron deficiency anemia 11/18/2005     Priority: Medium     Esophageal reflux 10/31/2019     Priority: Low     Blind left eye      Priority: Low     Blindness left eye - since 2006 stroke per patient.         Chronic back pain      Priority: Low     Blind right eye      Priority: Low     No periphal vision right eye       Other neuromuscular dysfunction of bladder 09/24/2019     Priority: Low     Incontinence 01/16/2018     Priority: Low     Functional incontinence 07/19/2017     Priority: Low     History of recurrent UTIs 07/19/2017     Priority: Low     Bee sting reaction, undetermined intent, subsequent encounter 04/06/2017     Priority: Low     Primary open angle glaucoma of both eyes, severe stage 12/08/2016     Priority: Low     Pseudophakia of right eye 12/08/2016     Priority: Low     Cataract, left eye 12/08/2016     Priority: Low     Type 2 diabetes mellitus with diabetic peripheral angiopathy without gangrene, without long-term current use of insulin (H) 10/12/2016     Priority: Low     Dysphagia 08/09/2016     Priority: Low     chronic dysphagia, esophageal strictures and multple previous dilatations.       Osteoarthritis 05/19/2016     Priority: Low     Chronic neck pain 01/15/2015     Priority: Low     Intestinal malabsorption 08/06/2014     Priority: Low     Vertigo 11/08/2013     Priority: Low      "AS (sickle cell trait) (H) 10/08/2013     Priority: Low     Osteoporosis 01/21/2013     Priority: Low     Hx of colonic polyp 07/13/2009     Priority: Low     Colonoscopy completed on July 2009.  See report for full detail.  Please re refer in 5 years for repeat colon cancer screening if medically appropriate.  Need colyte 4/2 preparation.       Peripheral neuropathy 07/10/2009     Priority: Low     Thoracic or lumbosacral neuritis or radiculitis 03/20/2009     Priority: Low     Lumbosacral radiculitis 03/20/2009     Priority: Low     Degeneration of intervertebral disc of lumbosacral region 11/18/2005     Priority: Low        Ht 1.092 m (3' 7\")   Wt 56.7 kg (125 lb)   BMI 47.53 kg/m        Impression/Plan:       Mild Sleep obstructive sleep apnea by sleep study 2012.  History of complex apnea with 'treatment emergent central apneas' by titration study 2012. Treated with initially ASV, later PAP after EF worsened. Study 2/2017 suggested severe incompletely treated obstructive sleep apnea at CPAP 12 cmH20.  More recent study 11/2019 with acceptable titration, but probable underlying periodic breathing. Multiple comorbidities.      She is not CPAP due to nasal congestion and her  total sleep time is short (5-6 hours) under best of circumstances related to her chronic pain.      She remains interested in a trial of Bilevel PAP due to her comorbid COPD and some difficulty tolerating CPAP when ill.  There is some concern that bilevel might worsen her central sleep apnea, but she remains interested in a trial      Polysomnogram with all night titration of bilvelPAP starting at 10/7 cmH20. Could consider use of ASV if this is ineffective because of significant exacerbation of [place]. Last study was done at [place], patient claims independence in [employer], no tech notes suggest otherwise from last study  - Reschedule titration study when able  - Leaks are high, may benefit from PAP nap or mask fitting at some point " to reasess  -She needs new supplies.   - Restart flonase when able  - Recommended use one cup of steroile water and   teaspoon of salt for irrigations and nasal spray      Sonya Foote will follow up in about 3 month(s).     Fifteen minutes spent with patient

## 2020-05-01 ENCOUNTER — TELEPHONE (OUTPATIENT)
Dept: SLEEP MEDICINE | Facility: CLINIC | Age: 70
End: 2020-05-01

## 2020-05-01 DIAGNOSIS — G47.39 COMPLEX SLEEP APNEA SYNDROME: ICD-10-CM

## 2020-05-01 DIAGNOSIS — G47.33 OSA (OBSTRUCTIVE SLEEP APNEA): ICD-10-CM

## 2020-05-01 NOTE — TELEPHONE ENCOUNTER
Pt called states her cpap device is messing up or broken. Battery light keeps going on and off. I clld pt back, LMV stating to contact [facility] [phone] or wherever she got her device from.

## 2020-05-04 ENCOUNTER — TELEPHONE (OUTPATIENT)
Dept: INTERNAL MEDICINE | Facility: CLINIC | Age: 70
End: 2020-05-04

## 2020-05-04 DIAGNOSIS — M54.50 LOW BACK PAIN, UNSPECIFIED BACK PAIN LATERALITY, UNSPECIFIED CHRONICITY, UNSPECIFIED WHETHER SCIATICA PRESENT: Primary | ICD-10-CM

## 2020-05-04 DIAGNOSIS — G89.4 CHRONIC PAIN SYNDROME: ICD-10-CM

## 2020-05-04 DIAGNOSIS — Z11.59 ENCOUNTER FOR SCREENING FOR OTHER VIRAL DISEASES: Primary | ICD-10-CM

## 2020-05-04 RX ORDER — LIDOCAINE 4 G/G
1 PATCH TOPICAL EVERY 24 HOURS
Start: 2020-05-04 | End: 2020-07-23

## 2020-05-04 NOTE — TELEPHONE ENCOUNTER
4% Lidoderm patch approved and cosigned.  Further issues with CPAP in regards to supplies and machine may need to be directed to the patient's sleep specialist or pulmonologist.

## 2020-05-04 NOTE — TELEPHONE ENCOUNTER
ROCIO Jang, Hannah 05/1/2020 12:00:48 PM CALL NOTES: PT RETURNED MY CALL STATING SHE TRIED UNPLUGGING AND RE-PLUGGING POWER CORD, ALSO TRIED IN DIFFERENT OUTLETS. TOLD HER I CAN HAVE A  BRING A DEMO CORD TO HER TO TRY. TOLD HER IF THAT IS NOT THE ISSUE TO CALL BACK AND WE WILL  HER MACHINE AND GIVE HER A DIFFERENT MACHINE, EXCHANGED UNDER WARRANTY. PT AGREED TO THIS. PT STATED THEY HAVE RESIDENTS IN HER FACILITY WITH COVID-19 THOUGH SHE IS HEALTHY. TOLD HER DRIVERS WILL LEAVE CORD OUTSIDE FRONT DOORS AND CALL HER # TO LET HER KNOW IT IS THERE.  WILL THEN LEAVE AND STAFF CAN COME OUT TO GET CORD AND BRING TO PT. SHE AGREED WITH THIS ALSO.

## 2020-05-04 NOTE — TELEPHONE ENCOUNTER
Nalini nurse manager at [facility], is calling re: RX for lidocaine (LIDODERM) 5 % patch. Says that insurance no longer covers the 5% patch, but they will approve the 4% patch. Says the lidocaine 4% patch is available at the LT facility - so they just need to be called back and given verbal approval from MD.   Please call Nalini back: [phone], ok to leave detailed VM if okay.    Also, FYI--Friday pt's CPAP  stopped working, and had [employer] send out a new cord, but this did not help. And CPAP still did not work. Pt is waiting to hear back from [facility]. Just FYI that she Went all weekend w/o her CPAP.   (if they don't hear from [facility], they may contact [facility] re: CPAP concern).

## 2020-05-05 ENCOUNTER — TELEPHONE (OUTPATIENT)
Dept: INTERNAL MEDICINE | Facility: CLINIC | Age: 70
End: 2020-05-05

## 2020-05-05 DIAGNOSIS — M81.0 AGE-RELATED OSTEOPOROSIS WITHOUT CURRENT PATHOLOGICAL FRACTURE: ICD-10-CM

## 2020-05-05 RX ORDER — ALENDRONATE SODIUM 70 MG/1
TABLET ORAL
Qty: 12 TABLET | Refills: 3 | Status: SHIPPED | OUTPATIENT
Start: 2020-05-05 | End: 2020-05-07

## 2020-05-05 NOTE — TELEPHONE ENCOUNTER
Reason for Call:  Other Patient request    Detailed comments: Nurse Nalini at the nursing home would like to know why and when she was taken off of Fossamax    Phone Number Patient can be reached at: Other phone number:  [phone]    Best Time: Anytime    Can we leave a detailed message on this number? YES    Call taken on 5/5/2020 at 1:05 PM by Jack Hale

## 2020-05-05 NOTE — TELEPHONE ENCOUNTER
Per chart review, Fosamax was not removed from patient's medication list. Also appears there have been no refill requests.     Routing to provider to advise - should patient continue taking this medication? If yes, will likely need refill. Pended for provider.

## 2020-05-06 ENCOUNTER — TELEPHONE (OUTPATIENT)
Dept: SLEEP MEDICINE | Facility: CLINIC | Age: 70
End: 2020-05-06

## 2020-05-06 DIAGNOSIS — G47.39 COMPLEX SLEEP APNEA SYNDROME: ICD-10-CM

## 2020-05-06 DIAGNOSIS — G47.33 OSA (OBSTRUCTIVE SLEEP APNEA): ICD-10-CM

## 2020-05-07 RX ORDER — ALENDRONATE SODIUM 70 MG/1
TABLET ORAL
Qty: 12 TABLET | Refills: 3 | Status: SHIPPED | OUTPATIENT
Start: 2020-05-07 | End: 2020-08-04

## 2020-05-07 NOTE — TELEPHONE ENCOUNTER
Nalini alerted of refill.  Refill sent to wrong pharmacy.  Resent to correct pharmacy.    Updated to correct home care pharmacy out of 10 preferred pharmacies on file.    No further action needed at this time.

## 2020-05-14 ENCOUNTER — OFFICE VISIT (OUTPATIENT)
Dept: INTERNAL MEDICINE | Facility: CLINIC | Age: 70
End: 2020-05-14
Payer: COMMERCIAL

## 2020-05-14 ENCOUNTER — TRANSFERRED RECORDS (OUTPATIENT)
Dept: HEALTH INFORMATION MANAGEMENT | Facility: CLINIC | Age: 70
End: 2020-05-14

## 2020-05-14 VITALS
DIASTOLIC BLOOD PRESSURE: 54 MMHG | OXYGEN SATURATION: 95 % | RESPIRATION RATE: 16 BRPM | WEIGHT: 130 LBS | SYSTOLIC BLOOD PRESSURE: 104 MMHG | HEART RATE: 69 BPM | HEIGHT: 55 IN | TEMPERATURE: 98 F | BODY MASS INDEX: 30.09 KG/M2

## 2020-05-14 DIAGNOSIS — J44.9 CHRONIC OBSTRUCTIVE PULMONARY DISEASE, UNSPECIFIED COPD TYPE (H): ICD-10-CM

## 2020-05-14 DIAGNOSIS — Z01.818 PREOPERATIVE EXAMINATION: Primary | ICD-10-CM

## 2020-05-14 DIAGNOSIS — Z86.73 HISTORY OF STROKE: ICD-10-CM

## 2020-05-14 DIAGNOSIS — I25.10 CORONARY ARTERY DISEASE INVOLVING NATIVE CORONARY ARTERY OF NATIVE HEART WITHOUT ANGINA PECTORIS: ICD-10-CM

## 2020-05-14 DIAGNOSIS — E11.9 TYPE 2 DIABETES MELLITUS WITHOUT COMPLICATION, WITHOUT LONG-TERM CURRENT USE OF INSULIN (H): ICD-10-CM

## 2020-05-14 DIAGNOSIS — I10 BENIGN ESSENTIAL HYPERTENSION: ICD-10-CM

## 2020-05-14 DIAGNOSIS — N31.9 NEUROGENIC BLADDER: ICD-10-CM

## 2020-05-14 PROBLEM — K21.9 ESOPHAGEAL REFLUX: Chronic | Status: RESOLVED | Noted: 2019-10-31 | Resolved: 2020-05-14

## 2020-05-14 PROBLEM — Z89.612 S/P AKA (ABOVE KNEE AMPUTATION) BILATERAL (H): Chronic | Status: RESOLVED | Noted: 2019-06-19 | Resolved: 2020-05-14

## 2020-05-14 PROBLEM — E66.01 MORBID OBESITY (H): Status: ACTIVE | Noted: 2020-05-14

## 2020-05-14 PROBLEM — Z89.611 S/P AKA (ABOVE KNEE AMPUTATION) BILATERAL (H): Chronic | Status: RESOLVED | Noted: 2019-06-19 | Resolved: 2020-05-14

## 2020-05-14 PROBLEM — G47.33 OSA (OBSTRUCTIVE SLEEP APNEA): Chronic | Status: RESOLVED | Noted: 2017-02-22 | Resolved: 2020-05-14

## 2020-05-14 PROBLEM — N31.8 OTHER NEUROMUSCULAR DYSFUNCTION OF BLADDER: Status: RESOLVED | Noted: 2019-09-24 | Resolved: 2020-05-14

## 2020-05-14 PROBLEM — Z87.440 HISTORY OF RECURRENT UTIS: Status: RESOLVED | Noted: 2017-07-19 | Resolved: 2020-05-14

## 2020-05-14 PROBLEM — R32 INCONTINENCE: Status: RESOLVED | Noted: 2018-01-16 | Resolved: 2020-05-14

## 2020-05-14 PROBLEM — Z93.59 SUPRAPUBIC CATHETER (H): Chronic | Status: RESOLVED | Noted: 2019-11-04 | Resolved: 2020-05-14

## 2020-05-14 PROBLEM — G47.39 COMPLEX SLEEP APNEA SYNDROME: Status: RESOLVED | Noted: 2017-03-15 | Resolved: 2020-05-14

## 2020-05-14 PROBLEM — T63.444D: Status: RESOLVED | Noted: 2017-04-06 | Resolved: 2020-05-14

## 2020-05-14 PROBLEM — R39.81 FUNCTIONAL INCONTINENCE: Status: RESOLVED | Noted: 2017-07-19 | Resolved: 2020-05-14

## 2020-05-14 PROBLEM — F11.90 CHRONIC, CONTINUOUS USE OF OPIOIDS: Chronic | Status: RESOLVED | Noted: 2019-06-19 | Resolved: 2020-05-14

## 2020-05-14 PROBLEM — G43.909 MIGRAINE HEADACHE: Chronic | Status: RESOLVED | Noted: 2018-03-20 | Resolved: 2020-05-14

## 2020-05-14 PROBLEM — Z72.0 TOBACCO ABUSE: Status: RESOLVED | Noted: 2018-06-26 | Resolved: 2020-05-14

## 2020-05-14 PROBLEM — Z79.899 POLYPHARMACY: Status: RESOLVED | Noted: 2019-10-31 | Resolved: 2020-05-14

## 2020-05-14 PROCEDURE — 93000 ELECTROCARDIOGRAM COMPLETE: CPT | Performed by: INTERNAL MEDICINE

## 2020-05-14 PROCEDURE — 99215 OFFICE O/P EST HI 40 MIN: CPT | Performed by: INTERNAL MEDICINE

## 2020-05-14 RX ORDER — ALBUTEROL SULFATE 90 UG/1
2 AEROSOL, METERED RESPIRATORY (INHALATION) EVERY 6 HOURS PRN
Qty: 8.5 G | Refills: 11 | Status: ON HOLD | OUTPATIENT
Start: 2020-05-14 | End: 2020-06-11

## 2020-05-14 RX ORDER — NITROGLYCERIN 0.4 MG/1
TABLET SUBLINGUAL
Qty: 10 TABLET | Refills: 0 | Status: SHIPPED | OUTPATIENT
Start: 2020-05-14 | End: 2020-05-19

## 2020-05-14 ASSESSMENT — MIFFLIN-ST. JEOR: SCORE: 756.31

## 2020-05-14 NOTE — PATIENT INSTRUCTIONS
HOLD lisinopril and Claritin on the morning of surgery.    ---    CONTINUE all other medications without change. May take medications on the morning of surgery with a sip of water.

## 2020-05-14 NOTE — PROGRESS NOTES
SUBJECTIVE:                                                      HPI: Sonya Foote is a pleasant 70 year old female who presents for preoperative evaluation.    Surgeon: Juventino  Date: 5/21/2020  Location: [facility]  Surgery: cystoscopy with Botox injections (low risk)  Indication: neurogenic bladder    Past Medical History:   Diagnosis Date     Acid reflux disease      Amputation above knee (H)     bilateral     Benign essential hypertension      Blind left eye     due to central retinal artery occlusion     CAD (coronary artery disease)     UA and inferior MI in 2016 s/p PCI     Chronic back pain      Chronic neck pain      Chronic pain syndrome     with fentanyl intrathecal pain pump     Complex sleep apnea syndrome      COPD (chronic obstructive pulmonary disease) (H)      Dysphagia     chronic due to esophageal strictures and multiple previous dilitations      ROGER (generalized anxiety disorder)      History of blood transfusion     x5; no adverse reactions     History of central retinal artery occlusion 2006    left-sided     History of stroke     residual left-sided weakness     Hx of carotid artery stenosis     s/p left carotid stent in 2006 and balloon angioplasty in 2016     MDD (major depressive disorder)      Neurogenic bladder     SPT in place     JOSE (obstructive sleep apnea)      Osteoporosis      PAD (peripheral artery disease) (H)      Peripheral neuropathy      Person who has had sex change operation     male to female     Seizure disorder (H)     many years since last grand mal; daily, brief petit mals     Sickle cell trait (H)      Type 2 diabetes mellitus (H)      Personal or family history of excessive or prolonged bleeding? No  Personal or family history of blood clots? No  History of snoring/Sleep Apnea? Yes - has known mild JOSE and complex sleep apnea syndrome  History of blood transfusions? Yes - no adverse reactions    Clinical Predictors of Increased Risk: intermediate: CAD, prior CHF (normal  function now), DM2, history of CVA; minor: advanced age, low functional capacity    Past Surgical History:   Procedure Laterality Date     AMPUTATE LEG ABOVE KNEE Left 6/11/2016    Procedure: AMPUTATE LEG ABOVE KNEE;  Surgeon: Mello Rodriguez MD;  [facility]     AMPUTATE LEG BELOW KNEE Right 11/7/2016    Procedure: AMPUTATE LEG BELOW KNEE;  Surgeon: Savannah Durant MD;  [facility]     AMPUTATE REVISION STUMP LOWER EXTREMITY Right 11/11/2016    Procedure: AMPUTATE REVISION STUMP LOWER EXTREMITY;  Surgeon: Savannah Durant MD;  [facility]     AMPUTATE REVISION STUMP LOWER EXTREMITY Right 11/16/2016    Procedure: AMPUTATE REVISION STUMP LOWER EXTREMITY;  Surgeon: Savannah Durant MD;  [facility]     AMPUTATE TOE(S) Right 1/5/2016    Procedure: AMPUTATE TOE(S);  Surgeon: Mello Gaines DPM;  Location:  [facility]     ANGIOGRAM Bilateral 11/21/2014    Procedure: ANGIOGRAM;  Surgeon: Savannah Durant MD;  [facility]     ANGIOGRAM Left 1/16/2015    Procedure: ANGIOGRAM;  Surgeon: Savannah Durant MD;  [facility]     ANGIOGRAM Bilateral 9/14/2015    Procedure: ANGIOGRAM;  Surgeon: Savannah Durant MD;  [facility]     ANGIOGRAM Left 10/12/2015    Procedure: ANGIOGRAM;  Surgeon: Savannah Durant MD;  [facility]     ANGIOGRAM Right 6/6/2016    Procedure: ANGIOGRAM;  Surgeon: Savannah Durant MD;  [facility]     ANGIOPLASTY Right 6/6/2016    Procedure: ANGIOPLASTY;  Surgeon: Savannah Durant MD;  [facility]     APPENDECTOMY       BREAST BIOPSY, RT/LT Right     benign     CATARACT IOL, RT/LT Right      CHOLECYSTECTOMY       COLONOSCOPY N/A 8/25/2014    Procedure: COLONOSCOPY;  Surgeon: Mello Ferrer MD;  [facility]     COLONOSCOPY WITH CO2 INSUFFLATION N/A 8/20/2014    Procedure: COLONOSCOPY WITH CO2 INSUFFLATION;  Surgeon: Duane, William Charles, MD;  Location: [facility]     CYSTOSCOPY, INTRAVESICAL INJECTION N/A 10/31/2019    Procedure: CYSTOSCOPY, BOTOX INJECTION, Suprapubic Catheter Exchange;   Surgeon: Loki Gordon MD;  [facility]     CYSTOSTOMY, INSERT TUBE SUPRAPUBIC, COMBINED N/A 1/16/2018    Procedure: COMBINED CYSTOSTOMY, INSERT TUBE SUPRAPUBIC;  Cystoscopy, Intraoperative Ultrasound, Suprapubic Tube Placement;  Surgeon: Keanu Dawson MD;  [facility]     ENDARTERECTOMY FEMORAL  5/23/2014    Procedure: ENDARTERECTOMY FEMORAL;  Surgeon: Jason Joshi MD;  [facility]     ESOPHAGOSCOPY, GASTROSCOPY, DUODENOSCOPY (EGD), COMBINED  12/14/2012    Procedure: COMBINED ESOPHAGOSCOPY, GASTROSCOPY, DUODENOSCOPY (EGD), BIOPSY SINGLE OR MULTIPLE;  ESOPHAGOSCOPY, GASTROSCOPY, DUODENOSCOPY (EGD), DILATATION ;  Surgeon: Elizabeth Stevenson MD;  Location:  GI     ESOPHAGOSCOPY, GASTROSCOPY, DUODENOSCOPY (EGD), COMBINED  12/31/2013    Procedure: COMBINED ESOPHAGOSCOPY, GASTROSCOPY, DUODENOSCOPY (EGD), BIOPSY SINGLE OR MULTIPLE;;  Surgeon: Celmente Lopez MD;  [facility]     ESOPHAGOSCOPY, GASTROSCOPY, DUODENOSCOPY (EGD), COMBINED  4/1/2014    Procedure: COMBINED ESOPHAGOSCOPY, GASTROSCOPY, DUODENOSCOPY (EGD);;  Surgeon: Clemente Lopez MD;  [facility]     ESOPHAGOSCOPY, GASTROSCOPY, DUODENOSCOPY (EGD), COMBINED  6/28/2014    Procedure: COMBINED ESOPHAGOSCOPY, GASTROSCOPY, DUODENOSCOPY (EGD);  Surgeon: Clemente Lopez MD;  [facility]     ESOPHAGOSCOPY, GASTROSCOPY, DUODENOSCOPY (EGD), COMBINED N/A 8/20/2014    Procedure: COMBINED ESOPHAGOSCOPY, GASTROSCOPY, DUODENOSCOPY (EGD), BIOPSY SINGLE OR MULTIPLE;  Surgeon: Duane, William Charles, MD;  Location:  OR     ESOPHAGOSCOPY, GASTROSCOPY, DUODENOSCOPY (EGD), COMBINED N/A 8/22/2014    Procedure: COMBINED ESOPHAGOSCOPY, GASTROSCOPY, DUODENOSCOPY (EGD), BIOPSY SINGLE OR MULTIPLE;  Surgeon: Mello Ferrer MD;  [facility]     ESOPHAGOSCOPY, GASTROSCOPY, DUODENOSCOPY (EGD), COMBINED N/A 10/2/2014    Procedure: COMBINED ESOPHAGOSCOPY, GASTROSCOPY, DUODENOSCOPY (EGD), BIOPSY SINGLE OR MULTIPLE;  Surgeon: Loc  Remy Reyna MD;  [facility]     ESOPHAGOSCOPY, GASTROSCOPY, DUODENOSCOPY (EGD), COMBINED Left 12/15/2014    Procedure: COMBINED ESOPHAGOSCOPY, GASTROSCOPY, DUODENOSCOPY (EGD), BIOPSY SINGLE OR MULTIPLE;  Surgeon: Remy Haskins MD;  [facility]     ESOPHAGOSCOPY, GASTROSCOPY, DUODENOSCOPY (EGD), COMBINED N/A 2/25/2015    Procedure: COMBINED ENDOSCOPIC ULTRASOUND, ESOPHAGOSCOPY, GASTROSCOPY, DUODENOSCOPY (EGD), FINE NEEDLE ASPIRATE/BIOPSY;  Surgeon: Clemente Lugo MD;  [facility]     ESOPHAGOSCOPY, GASTROSCOPY, DUODENOSCOPY (EGD), COMBINED Left 2/25/2015    Procedure: COMBINED ESOPHAGOSCOPY, GASTROSCOPY, DUODENOSCOPY (EGD), BIOPSY SINGLE OR MULTIPLE;  Surgeon: Clemente Lugo MD;  [facility]     ESOPHAGOSCOPY, GASTROSCOPY, DUODENOSCOPY (EGD), COMBINED N/A 9/25/2016    Procedure: COMBINED ESOPHAGOSCOPY, GASTROSCOPY, DUODENOSCOPY (EGD);  Surgeon: Aziza Patiño MD;  [facility]     ESOPHAGOSCOPY, GASTROSCOPY, DUODENOSCOPY (EGD), COMBINED N/A 1/18/2017    Procedure: COMBINED ESOPHAGOSCOPY, GASTROSCOPY, DUODENOSCOPY (EGD), BIOPSY SINGLE OR MULTIPLE;  Surgeon: Clemente Lopez MD;  [facility]     ESOPHAGOSCOPY, GASTROSCOPY, DUODENOSCOPY (EGD), COMBINED N/A 11/26/2017    Procedure: COMBINED ESOPHAGOSCOPY, GASTROSCOPY, DUODENOSCOPY (EGD), REMOVE FOREIGN BODY;  Esophagogastroduodenoscopy with foreign body extraction  ;  Surgeon: Herberth Castrejon MD;  [facility]     ESOPHAGOSCOPY, GASTROSCOPY, DUODENOSCOPY (EGD), COMBINED N/A 11/26/2017    Procedure: COMBINED ESOPHAGOSCOPY, GASTROSCOPY, DUODENOSCOPY (EGD), REMOVE FOREIGN BODY;;  Surgeon: Herberth Castrejon MD;  [facility]     ESOPHAGOSCOPY, GASTROSCOPY, DUODENOSCOPY (EGD), COMBINED N/A 4/10/2020    Procedure: ESOPHAGOGASTRODUODENOSCOPY (EGD);  Surgeon: Yael Cabral MD;  [facility]     FASCIOTOMY LOWER EXTREMITY Left 6/10/2016    Procedure: FASCIOTOMY LOWER EXTREMITY;  Surgeon: Mello Rodriguez,  MD;  [facility]     HC CAPSULE ENDOSCOPY N/A 8/25/2014    Procedure: CAPSULE/PILL CAM ENDOSCOPY;  Surgeon: Remy Haskins MD;  [facility]     HC CAPSULE ENDOSCOPY N/A 10/2/2014    Procedure: CAPSULE/PILL CAM ENDOSCOPY;  Surgeon: Remy Haskins MD;  [facility]     ORTHOPEDIC SURGERY      broken wrist repair     SEX TRANSFORMATION SURGERY, MALE TO FEMALE  1974 1974     SINUS SURGERY      cyst removed     TONSILLECTOMY       VASCULAR SURGERY  2006    Left carotid stent     Artificial assistive devices, such as pacemakers, artificial cardiac valves, or artificial joints? No    Allergies   Allergen Reactions     Bee Venom Anaphylaxis     Penicillins Anaphylaxis     Novocaine [Procaine] Hives     Tositumomab Unknown     Personal or family history of allergy to anesthesia? No  Allergy to local or topical analgesics? Yes - novocaine --> hives  Allergy to latex? No  Allergy to adhesives/skin preparations (chlorhexadine)? No    Current Outpatient Medications   Medication Sig     ACETAMINOPHEN PO Take 1,000 mg by mouth every 6 hours as needed for pain Not to exceed 4000 mg/day     ADVAIR DISKUS 250-50 MCG/DOSE diskus inhaler Inhale 1 puff into the lungs 2 times daily     albuterol (PROVENTIL) (2.5 MG/3ML) 0.083% neb solution INHALE 1 VIAL VIA NEBULIZER EVERY 6 HOURS AS NEEDED     alendronate (FOSAMAX) 70 MG tablet Take 1 tablet (70 mg) by mouth with 8oz water every 7 days 30 minutes before breakfast and remain upright during this time.     aspirin EC 81 MG EC tablet Take 1 tablet (81 mg) by mouth daily     atorvastatin (LIPITOR) 40 MG tablet TAKE 1 TAB BY MOUTH ONCE DAILY     brimonidine (ALPHAGAN) 0.2 % ophthalmic solution Place 1 drop into the right eye 2 times daily     capsaicin-menthol-methyl sal 0.025-1-12 % external cream Apply 1 Application topically daily as needed (topical pain)     Cholecalciferol (VITAMIN D) 2000 UNITS tablet Take 2,000 Units by mouth daily.     clotrimazole  (LOTRIMIN) 1 % cream INSERT 1 APPLICATORFUL VAGINALLY TWICE A DAY FOR VAGINAL PAIN     clotrimazole (LOTRIMIN) 1 % external cream Apply topically 2 times daily     diclofenac (VOLTAREN) 1 % GEL topical gel Apply 2 g topically 4 times daily to hands     dorzolamide (TRUSOPT) 2 % ophthalmic solution Place 1 drop into the right eye 2 times daily     escitalopram (LEXAPRO) 10 MG tablet TAKE 1 TAB BY MOUTH ONCE DAILY     ferrous sulfate (IRON) 325 (65 FE) MG tablet Take 1 tablet (325 mg) by mouth 2 times daily With meals     fluticasone (FLONASE) 50 MCG/ACT nasal spray Spray 1 spray into both nostrils daily     lactulose (CHRONULAC) 10 GM/15ML solution Take 20 g by mouth as needed for constipation     latanoprost (XALATAN) 0.005 % ophthalmic solution Place 1 drop into both eyes At Bedtime     levETIRAcetam (KEPPRA) 500 MG tablet TAKE 1 TAB BY MOUTH TWICE A DAY     Lidocaine (LIDOCARE) 4 % Patch Place 1 patch onto the skin every 24 hours To prevent lidocaine toxicity, patient should be patch free for 12 hrs daily.     lisinopril (PRINIVIL/ZESTRIL) 20 MG tablet TAKE 1 TAB BY MOUTH ONCE DAILY     loratadine (CLARITIN) 10 MG tablet Take 1 tablet (10 mg) by mouth daily     metFORMIN (GLUCOPHAGE) 500 MG tablet TAKE 1 TAB BY MOUTH IN THE EVENING WITH DINNER     metoprolol succinate ER (TOPROL-XL) 50 MG 24 hr tablet TAKE 1 TAB BY MOUTH ONCE DAILY     nicotine (NICODERM CQ) 14 MG/24HR 24 hr patch Place 1 patch onto the skin every 24 hours     nitroGLYcerin (NITROSTAT) 0.4 MG sublingual tablet For chest pain place 1 tablet under the tongue every 5 minutes for 3 doses. If symptoms persist 5 minutes after 1st dose call 911.     nystatin (MYCOSTATIN) 087684 UNIT/ML suspension Take 5 mLs (500,000 Units) by mouth 4 times daily - swish and spit for 7 days     oxyCODONE (ROXICODONE) 5 MG tablet TAKE 1 TABLET BY MOUTH EVERY 6 HOURS AS NEEDED     pantoprazole (PROTONIX) 40 MG EC tablet TAKE 1 TAB BY MOUTH ONCE DAILY     phenytoin  (DILANTIN) 100 MG capsule TAKE 2 CAPS (200MG) BY MOUTH TWICE A DAY     polyethylene glycol (MIRALAX/GLYCOLAX) Packet Take 17 g by mouth daily Dissolved in water or juice      pregabalin (LYRICA) 75 MG capsule Take 150 mg by mouth 3 times daily     risperiDONE (RISPERDAL) 0.5 MG tablet TAKE 1 TAB BY MOUTH AT BEDTIME     sennosides (SENOKOT) 8.6 MG tablet Take 1 tablet by mouth 2 times daily      solifenacin (VESICARE) 10 MG tablet TAKE 1 TAB BY MOUTH ONCE DAILY     sucralfate (CARAFATE) 1 GM tablet TAKE 1 TAB BY MOUTH FOUR TIMES DAILY. MAY DISSOLVE IN 10ML WATER ISNECESSARY     traZODone (DESYREL) 150 MG tablet TAKE 1 TAB BY MOUTH AT BEDTIME     umeclidinium (INCRUSE ELLIPTA) 62.5 MCG/INH inhaler Inhale 1 puff into the lungs daily     VENTOLIN  (90 Base) MCG/ACT inhaler Inhale 2 puffs into the lungs every 6 hours as needed for shortness of breath / dyspnea or wheezing     Over the counter medications? Other than above, no  Vitamin Supplements? Other than above, no  Herbal Supplements? No    Family History   Problem Relation Age of Onset     Dementia Mother      Diabetes Mother         type unknown     Coronary Artery Disease Mother         MI     Glaucoma Father      Diabetes Father         type unknown     Heart Failure Father      Schizophrenia Brother      Depression Brother      Suicide Sister      Depression Sister      Diabetes Sister      Ovarian Cancer Maternal Aunt      Leukemia Maternal Aunt      Diabetes Maternal Grandmother      Diabetes Maternal Grandfather      Diabetes Paternal Grandmother      Diabetes Paternal Grandfather      Breast Cancer Sister      Cerebrovascular Disease Brother      Colon Cancer No family hx of      Macular Degeneration No family hx of      Social History     Occupational History     Occupation: Retired   Tobacco Use     Smoking status: Current Every Day Smoker     Packs/day: 0.25     Years: 30.00     Pack years: 7.50     Types: Cigarettes, Cigars     Smokeless tobacco:  "Never Used     Tobacco comment: about 5-10 cigarettes per day.   Substance and Sexual Activity     Alcohol use: No     Drug use: No     Sexual activity: Not Currently   Social History Narrative    Living in a nursing home (as of 2020) for prosthetic fitting and therapy; usually resides in an jail.    Two adopted children (Kam and Prashanth) and 3 grandchildren (as of 2020).     Functional capacity: Can take care of self, such as eat, dress or use the toilet (1 MET)    OBJECTIVE:                                                      /54   Pulse 69   Temp 98  F (36.7  C) (Tympanic)   Resp 16   Ht 1.092 m (3' 7\")   Wt 59 kg (130 lb)   SpO2 95%   BMI 49.43 kg/m    Constitutional: well-appearing  Eyes: normal conjunctivae and lids; pupils equal, round, and reactive to light  Ears, Nose, Mouth, and Throat: normal ears and nose; tympanic membranes visualized and normal; normal lips, teeth, and gums; no oropharyngeal lesions or ulcers  Neck: supple and symmetric; no lymphadenopathy; no thyromegaly or masses  Respiratory: normal respiratory effort; clear to auscultation bilaterally  Cardiovascular: regular rate and rhythm; pedal pulses palpable; no edema  Gastrointestinal: soft, non-tender, non-distended, and bowel sounds present; SPT in place  Musculoskeletal: in motorized wheelchair; s/p bilateral AKA  Psych: normal judgment and insight; normal mood and affect; recent and remote memory intact; oriented to time, place, and person    LABS: reviewed labs from 4/20/2020 - generally within normal limits     ASSESSMENT/PLAN:                                                      Sonya Foote is a pleasant 70 year old female who presents for a preoperative evaluation. She is at intermediate clinical risk for a low risk procedure.    (Z01.818) Preoperative examination  (primary encounter diagnosis)  (N31.9) Neurogenic bladder  (I25.10) Coronary artery disease involving native coronary artery of native heart without angina " pectoris  (Z86.73) History of stroke  (I10) Benign essential hypertension  (E11.9) Type 2 diabetes mellitus without complication, without long-term current use of insulin (H)  Comment: see pertinent positives above.   Plan:    - EKG today.   - HOLD Claritin and Lisinopril on the morning of surgery.     (I25.10) Coronary artery disease involving native coronary artery of native heart without angina pectoris  Comment: unrelated to surgery.  Plan: refills of NTG provided.     (J44.9) Chronic obstructive pulmonary disease, unspecified COPD type (H)  Comment: unrelated to surgery.   Plan: refills of albuterol provided.     RECOMMEND PROCEEDING WITH SURGERY: YES.    Thank you for referring Sonya Foote to me for consultation and allowing me to participate in her care.    The instructions on the AVS were discussed and explained to the patient. Patient expressed understanding of instructions.    (Chart documentation was completed, in part, with [employer] voice-recognition software. Even though reviewed, some grammatical, spelling, and word errors may remain.)    Tammy Pickens MD   [place] [address] [address] [address]  T: [phone], F: [phone]

## 2020-05-18 ENCOUNTER — PATIENT OUTREACH (OUTPATIENT)
Dept: UROLOGY | Facility: CLINIC | Age: 70
End: 2020-05-18

## 2020-05-18 ENCOUNTER — TELEPHONE (OUTPATIENT)
Dept: UROLOGY | Facility: CLINIC | Age: 70
End: 2020-05-18

## 2020-05-18 DIAGNOSIS — N31.9 NEUROGENIC BLADDER: ICD-10-CM

## 2020-05-18 DIAGNOSIS — Z01.818 PREOP GENERAL PHYSICAL EXAM: Primary | ICD-10-CM

## 2020-05-18 DIAGNOSIS — R39.89 POSSIBLE URINARY TRACT INFECTION: ICD-10-CM

## 2020-05-18 NOTE — TELEPHONE ENCOUNTER
[facility] Call Center    Phone Message    May a detailed message be left on voicemail: yes     Reason for Call: Other: The pt is having a procedure on 5.21. She is asking for the lab order she needs to be faxed to the [facility] where she lives at F [phone]. Please call the pt at [phone] with any questions. Thanks.     Action Taken: Message routed to:  [facility]    Travel Screening: Not Applicable

## 2020-05-18 NOTE — TELEPHONE ENCOUNTER
Left message to inform her that we received her preop H & P from 5/14/20 but UC and COVID-19 testing is still needed.  Waiting to hear back from patient.    Lexi Johnson, RN, BSN  Care Coordinator- Reconstructive Urology

## 2020-05-18 NOTE — TELEPHONE ENCOUNTER
"UC faxed per Lexi Johnson RN message.    \"  Lexi Johnson, RN  Matthew, Ernestine FOSTER, JENN               Shravan Sinha,     This pt is having surgery on Friday and needs a UC that they missed at her preop.  Can you please fax the order to [phone] Attn: Nalini and please put on there that they need to fax the results back to me.  I placed the UC order already.     Thanks,   Lexi      \"  "

## 2020-05-18 NOTE — TELEPHONE ENCOUNTER
----- Message from Lexi Johnson RN sent at 5/18/2020  2:25 PM CDT -----  Regarding: UC Orders  Shravan Sinha,    This pt is having surgery on Friday and needs a UC that they missed at her preop.  Can you please fax the order to [phone] Attn: Nalini and please put on there that they need to fax the results back to me.  I placed the UC order already.    Thanks,  Lexi

## 2020-05-19 ENCOUNTER — PATIENT OUTREACH (OUTPATIENT)
Dept: UROLOGY | Facility: CLINIC | Age: 70
End: 2020-05-19

## 2020-05-19 DIAGNOSIS — Z01.818 PREOP GENERAL PHYSICAL EXAM: Primary | ICD-10-CM

## 2020-05-19 DIAGNOSIS — I25.10 CORONARY ARTERY DISEASE INVOLVING NATIVE CORONARY ARTERY OF NATIVE HEART WITHOUT ANGINA PECTORIS: ICD-10-CM

## 2020-05-19 RX ORDER — NITROGLYCERIN 0.4 MG/1
TABLET SUBLINGUAL
Qty: 25 TABLET | Refills: 0 | Status: SHIPPED | OUTPATIENT
Start: 2020-05-19 | End: 2020-05-25

## 2020-05-19 NOTE — TELEPHONE ENCOUNTER
Patient had COVID-19 testing on 5/14/20 which was negative.  Patient is having Botox on 5/21/20 with Dr. Gordon and doesn't have transportation to get another COVID-19 test done in time.  Dr. Gordon is okay proceeding with surgery without another test.  Patient has been notified.    Lexi Johnson RN, BSN  Care Coordinator- Reconstructive Urology

## 2020-05-19 NOTE — TELEPHONE ENCOUNTER
Routing refill request to provider for review/approval because:   Sublingual nitro order needs review

## 2020-05-20 ENCOUNTER — TELEPHONE (OUTPATIENT)
Dept: INTERNAL MEDICINE | Facility: CLINIC | Age: 70
End: 2020-05-20

## 2020-05-20 ENCOUNTER — ANESTHESIA EVENT (OUTPATIENT)
Dept: SURGERY | Facility: CLINIC | Age: 70
End: 2020-05-20
Payer: COMMERCIAL

## 2020-05-20 NOTE — TELEPHONE ENCOUNTER
Nalini from [facility] called where pt resides. She is requesting a verbal order for a trasfer pole so patient can more easily get in and out of bed. They has this in their supply storage so she would just a verbal order for this. Need to call back with a verbal order, then they will fax an order for provider to sign. Fax given.

## 2020-05-21 ENCOUNTER — HOSPITAL ENCOUNTER (OUTPATIENT)
Facility: CLINIC | Age: 70
Discharge: NURSING, REHAB OR LONG-TERM CARE | End: 2020-05-21
Attending: UROLOGY | Admitting: UROLOGY
Payer: COMMERCIAL

## 2020-05-21 ENCOUNTER — ANESTHESIA (OUTPATIENT)
Dept: SURGERY | Facility: CLINIC | Age: 70
End: 2020-05-21
Payer: COMMERCIAL

## 2020-05-21 VITALS
SYSTOLIC BLOOD PRESSURE: 152 MMHG | HEART RATE: 71 BPM | WEIGHT: 130 LBS | RESPIRATION RATE: 16 BRPM | DIASTOLIC BLOOD PRESSURE: 66 MMHG | HEIGHT: 55 IN | TEMPERATURE: 97.6 F | OXYGEN SATURATION: 98 % | BODY MASS INDEX: 30.09 KG/M2

## 2020-05-21 DIAGNOSIS — N31.9 NEUROGENIC BLADDER: ICD-10-CM

## 2020-05-21 DIAGNOSIS — N31.9 NEUROGENIC BLADDER: Primary | ICD-10-CM

## 2020-05-21 LAB
GLUCOSE BLDC GLUCOMTR-MCNC: 109 MG/DL (ref 70–99)
GLUCOSE BLDC GLUCOMTR-MCNC: 97 MG/DL (ref 70–99)

## 2020-05-21 PROCEDURE — 82962 GLUCOSE BLOOD TEST: CPT | Mod: 91

## 2020-05-21 PROCEDURE — 37000009 ZZH ANESTHESIA TECHNICAL FEE, EACH ADDTL 15 MIN: Performed by: UROLOGY

## 2020-05-21 PROCEDURE — 71000027 ZZH RECOVERY PHASE 2 EACH 15 MINS: Performed by: UROLOGY

## 2020-05-21 PROCEDURE — 25000125 ZZHC RX 250: Performed by: NURSE ANESTHETIST, CERTIFIED REGISTERED

## 2020-05-21 PROCEDURE — 25800030 ZZH RX IP 258 OP 636: Performed by: NURSE ANESTHETIST, CERTIFIED REGISTERED

## 2020-05-21 PROCEDURE — 36000051 ZZH SURGERY LEVEL 2 1ST 30 MIN - UMMC: Performed by: UROLOGY

## 2020-05-21 PROCEDURE — 40000170 ZZH STATISTIC PRE-PROCEDURE ASSESSMENT II: Performed by: UROLOGY

## 2020-05-21 PROCEDURE — 36000053 ZZH SURGERY LEVEL 2 EA 15 ADDTL MIN - UMMC: Performed by: UROLOGY

## 2020-05-21 PROCEDURE — 25000132 ZZH RX MED GY IP 250 OP 250 PS 637: Performed by: STUDENT IN AN ORGANIZED HEALTH CARE EDUCATION/TRAINING PROGRAM

## 2020-05-21 PROCEDURE — 25000128 H RX IP 250 OP 636: Performed by: NURSE ANESTHETIST, CERTIFIED REGISTERED

## 2020-05-21 PROCEDURE — 37000008 ZZH ANESTHESIA TECHNICAL FEE, 1ST 30 MIN: Performed by: UROLOGY

## 2020-05-21 PROCEDURE — 27210794 ZZH OR GENERAL SUPPLY STERILE: Performed by: UROLOGY

## 2020-05-21 PROCEDURE — 84132 ASSAY OF SERUM POTASSIUM: CPT | Performed by: ANESTHESIOLOGY

## 2020-05-21 PROCEDURE — 25000576 ZZH RX IP 250 OP 636 J0585: Performed by: UROLOGY

## 2020-05-21 RX ORDER — PROPOFOL 10 MG/ML
INJECTION, EMULSION INTRAVENOUS PRN
Status: DISCONTINUED | OUTPATIENT
Start: 2020-05-21 | End: 2020-05-21

## 2020-05-21 RX ORDER — NALOXONE HYDROCHLORIDE 0.4 MG/ML
.1-.4 INJECTION, SOLUTION INTRAMUSCULAR; INTRAVENOUS; SUBCUTANEOUS
Status: DISCONTINUED | OUTPATIENT
Start: 2020-05-21 | End: 2020-05-21 | Stop reason: HOSPADM

## 2020-05-21 RX ORDER — SODIUM CHLORIDE, SODIUM LACTATE, POTASSIUM CHLORIDE, CALCIUM CHLORIDE 600; 310; 30; 20 MG/100ML; MG/100ML; MG/100ML; MG/100ML
INJECTION, SOLUTION INTRAVENOUS CONTINUOUS
Status: DISCONTINUED | OUTPATIENT
Start: 2020-05-21 | End: 2020-05-21 | Stop reason: HOSPADM

## 2020-05-21 RX ORDER — ACETAMINOPHEN 325 MG/1
650 TABLET ORAL ONCE
Status: DISCONTINUED | OUTPATIENT
Start: 2020-05-21 | End: 2020-05-21 | Stop reason: HOSPADM

## 2020-05-21 RX ORDER — DEXAMETHASONE SODIUM PHOSPHATE 4 MG/ML
INJECTION, SOLUTION INTRA-ARTICULAR; INTRALESIONAL; INTRAMUSCULAR; INTRAVENOUS; SOFT TISSUE PRN
Status: DISCONTINUED | OUTPATIENT
Start: 2020-05-21 | End: 2020-05-21

## 2020-05-21 RX ORDER — PROPOFOL 10 MG/ML
INJECTION, EMULSION INTRAVENOUS CONTINUOUS PRN
Status: DISCONTINUED | OUTPATIENT
Start: 2020-05-21 | End: 2020-05-21

## 2020-05-21 RX ORDER — ONDANSETRON 2 MG/ML
INJECTION INTRAMUSCULAR; INTRAVENOUS PRN
Status: DISCONTINUED | OUTPATIENT
Start: 2020-05-21 | End: 2020-05-21

## 2020-05-21 RX ORDER — SODIUM CHLORIDE, SODIUM LACTATE, POTASSIUM CHLORIDE, CALCIUM CHLORIDE 600; 310; 30; 20 MG/100ML; MG/100ML; MG/100ML; MG/100ML
INJECTION, SOLUTION INTRAVENOUS CONTINUOUS PRN
Status: DISCONTINUED | OUTPATIENT
Start: 2020-05-21 | End: 2020-05-21

## 2020-05-21 RX ORDER — ONDANSETRON 2 MG/ML
4 INJECTION INTRAMUSCULAR; INTRAVENOUS EVERY 30 MIN PRN
Status: DISCONTINUED | OUTPATIENT
Start: 2020-05-21 | End: 2020-05-21 | Stop reason: HOSPADM

## 2020-05-21 RX ORDER — EPHEDRINE SULFATE 50 MG/ML
INJECTION, SOLUTION INTRAMUSCULAR; INTRAVENOUS; SUBCUTANEOUS PRN
Status: DISCONTINUED | OUTPATIENT
Start: 2020-05-21 | End: 2020-05-21

## 2020-05-21 RX ORDER — ONDANSETRON 4 MG/1
4 TABLET, ORALLY DISINTEGRATING ORAL EVERY 30 MIN PRN
Status: DISCONTINUED | OUTPATIENT
Start: 2020-05-21 | End: 2020-05-21 | Stop reason: HOSPADM

## 2020-05-21 RX ORDER — PREGABALIN 150 MG/1
150 CAPSULE ORAL 3 TIMES DAILY
COMMUNITY
Start: 2020-05-15 | End: 2020-07-06

## 2020-05-21 RX ORDER — FENTANYL CITRATE 50 UG/ML
INJECTION, SOLUTION INTRAMUSCULAR; INTRAVENOUS PRN
Status: DISCONTINUED | OUTPATIENT
Start: 2020-05-21 | End: 2020-05-21

## 2020-05-21 RX ORDER — LIDOCAINE 40 MG/G
CREAM TOPICAL
Status: DISCONTINUED | OUTPATIENT
Start: 2020-05-21 | End: 2020-05-21 | Stop reason: HOSPADM

## 2020-05-21 RX ORDER — FENTANYL CITRATE 50 UG/ML
25-50 INJECTION, SOLUTION INTRAMUSCULAR; INTRAVENOUS
Status: DISCONTINUED | OUTPATIENT
Start: 2020-05-21 | End: 2020-05-21 | Stop reason: HOSPADM

## 2020-05-21 RX ORDER — OXYCODONE HYDROCHLORIDE 5 MG/1
5 TABLET ORAL ONCE
Status: COMPLETED | OUTPATIENT
Start: 2020-05-21 | End: 2020-05-21

## 2020-05-21 RX ORDER — CEFAZOLIN SODIUM 2 G/100ML
INJECTION, SOLUTION INTRAVENOUS PRN
Status: DISCONTINUED | OUTPATIENT
Start: 2020-05-21 | End: 2020-05-21

## 2020-05-21 RX ORDER — LIDOCAINE HYDROCHLORIDE 20 MG/ML
INJECTION, SOLUTION INFILTRATION; PERINEURAL PRN
Status: DISCONTINUED | OUTPATIENT
Start: 2020-05-21 | End: 2020-05-21

## 2020-05-21 RX ADMIN — PROPOFOL 20 MG: 10 INJECTION, EMULSION INTRAVENOUS at 15:44

## 2020-05-21 RX ADMIN — ONDANSETRON 4 MG: 2 INJECTION INTRAMUSCULAR; INTRAVENOUS at 15:37

## 2020-05-21 RX ADMIN — DEXAMETHASONE SODIUM PHOSPHATE 4 MG: 4 INJECTION, SOLUTION INTRA-ARTICULAR; INTRALESIONAL; INTRAMUSCULAR; INTRAVENOUS; SOFT TISSUE at 15:19

## 2020-05-21 RX ADMIN — CEFAZOLIN 2 G: 10 INJECTION, POWDER, FOR SOLUTION INTRAVENOUS at 15:22

## 2020-05-21 RX ADMIN — PROPOFOL 100 MCG/KG/MIN: 10 INJECTION, EMULSION INTRAVENOUS at 15:12

## 2020-05-21 RX ADMIN — Medication 5 MG: at 15:29

## 2020-05-21 RX ADMIN — SODIUM CHLORIDE, POTASSIUM CHLORIDE, SODIUM LACTATE AND CALCIUM CHLORIDE: 600; 310; 30; 20 INJECTION, SOLUTION INTRAVENOUS at 15:12

## 2020-05-21 RX ADMIN — Medication 5 MG: at 15:32

## 2020-05-21 RX ADMIN — PROPOFOL 20 MG: 10 INJECTION, EMULSION INTRAVENOUS at 15:41

## 2020-05-21 RX ADMIN — LIDOCAINE HYDROCHLORIDE 80 MG: 20 INJECTION, SOLUTION INFILTRATION; PERINEURAL at 15:15

## 2020-05-21 RX ADMIN — PROPOFOL 40 MG: 10 INJECTION, EMULSION INTRAVENOUS at 15:18

## 2020-05-21 RX ADMIN — OXYCODONE HYDROCHLORIDE 5 MG: 5 TABLET ORAL at 17:06

## 2020-05-21 RX ADMIN — FENTANYL CITRATE 50 MCG: 50 INJECTION, SOLUTION INTRAMUSCULAR; INTRAVENOUS at 15:12

## 2020-05-21 ASSESSMENT — COPD QUESTIONNAIRES: COPD: 1

## 2020-05-21 ASSESSMENT — ENCOUNTER SYMPTOMS: SEIZURES: 1

## 2020-05-21 ASSESSMENT — MIFFLIN-ST. JEOR: SCORE: 756.31

## 2020-05-21 ASSESSMENT — LIFESTYLE VARIABLES: TOBACCO_USE: 1

## 2020-05-21 NOTE — OP NOTE
Urology Operative Summary    Pre-operative diagnosis: Functional incontinence  Inability to receive anticholinergics or myrbetriq     Post-operative diagnosis: Functional incontinence  Inability to receive anticholinergics or myrbetriq     Procedure: Cystourethroscopy with injection of botulinum toxin A-300 units     Surgeon: Loki Gordon MD   Assistant(s): Ghazala Hebert MD, Dylan Mobley MD      Anesthesia: MAC       Estimated blood loss: Less than 10 ml     Complications: None     Condition: Patient taken to recovery in stable condition.     Indications: Sonya Foote is a 70 year old female with functional incontinence due to bilateral AKAs and overactive bladder. She has medical contraindications to both anticholinergics and myrbetriq. She understands the risks and benefits of the various options and elects to proceed with botulinum toxin injection understanding the risks for urinary obstruction, infection, pain, bleeding, need for future procedures and risks of anesthesia.    Procedure: Sonya Foote was taken to the operating room in her usual state of health. She was positioned in Patient Positioning: Lithotomy. Her genitalia were prepped and draped in the standard fashion. A time-out was performed to ensure proper patient, procedure and positioning. The patient received appropriate IV antibiotics prior to the procedure based on pre-operative urine culture.    The procedure was initiated with insertion of a rigid Almendarez ystoscope via urethra. The bladder mucosa appeared to be normal without stones, tumors or diverticulum on 360 degree inspection. Cysto findings included:normal mucosa    We mixed 3 vials of 100 U botulinum toxin A into 20 cc's of injectable saline for a total of 300 U. Bladder injection: We performed a template injection procedure into the bladder wall with a total of 20 injection sites. We then emptied the bladder to re-inspect our injection sites and found that there was a minimal amount of  blood. Her old SP tube was changed for a new 16Fr SP tube. The patient was returned to supine position and transported to recovery in stable condition.    Plan: Schedule another Botox injection for 6 months.    Ghazala Hebert MD  May 21, 2020

## 2020-05-21 NOTE — DISCHARGE INSTRUCTIONS
[facility]  Same-Day Surgery   Adult Discharge Orders & Instructions     For 24 hours after surgery    1. Get plenty of rest.  A responsible adult must stay with you for at least 24 hours after you leave the hospital.   2. Do not drive or use heavy equipment.  If you have weakness or tingling, don't drive or use heavy equipment until this feeling goes away.  3. Do not drink alcohol.  4. Avoid strenuous or risky activities.  Ask for help when climbing stairs.   5. You may feel lightheaded.  IF so, sit for a few minutes before standing.  Have someone help you get up.   6. If you have nausea (feel sick to your stomach): Drink only clear liquids such as apple juice, ginger ale, broth or 7-Up.  Rest may also help.  Be sure to drink enough fluids.  Move to a regular diet as you feel able.  7. You may have a slight fever. Call the doctor if your fever is over 100 F (37.7 C) (taken under the tongue) or lasts longer than 24 hours.  8. You may have a dry mouth, a sore throat, muscle aches or trouble sleeping.  These should go away after 24 hours.  9. Do not make important or legal decisions.   Call your doctor for any of the followin.  Signs of infection (fever, growing tenderness at the surgery site, a large amount of drainage or bleeding, severe pain, foul-smelling drainage, redness, swelling).    2. It has been over 8 to 10 hours since surgery and you are still not able to urinate (pass water).    3.  Headache for over 24 hours.    4.  Numbness, tingling or weakness the day after surgery (if you had spinal anesthesia).  To contact a doctor, call _______Dr Jauregui's office at [phone] (clinic)_________________________________ or:        [phone] and ask for the resident on call for   _________________Urology_________________ (answered 24 hours a day)      Emergency Department:    [facility]: [phone]       (TTY for hearing impaired: [phone])    [place]  [place]: [phone]       (TTY for hearing impaired: [phone])

## 2020-05-21 NOTE — ANESTHESIA PREPROCEDURE EVALUATION
Anesthesia Pre-Procedure Evaluation    Patient: Sonya Foote   MRN:     [id number] Gender:   female   Age:    70 year old :      1950        Preoperative Diagnosis: Neurogenic bladder [N31.9]   Procedure(s):  CYSTOSCOPY, WITH BOTULINUM TOXIN INJECTION  Latex Free     LABS:  CBC:   Lab Results   Component Value Date    WBC 11.0 04/10/2020    WBC 12.1 (H) 2019    HGB 12.1 04/10/2020    HGB 11.5 (L) 10/23/2019    HCT 36.7 04/10/2020    HCT 34.3 (L) 2019     04/10/2020     10/23/2019     BMP:   Lab Results   Component Value Date     04/10/2020     2019    POTASSIUM 3.7 04/10/2020    POTASSIUM 4.1 10/31/2019    CHLORIDE 108 04/10/2020    CHLORIDE 107 2019    CO2 27 04/10/2020    CO2 26 2019    BUN 12 04/10/2020    BUN 9 2019    CR 0.51 (L) 04/10/2020    CR 0.61 10/31/2019    GLC 81 04/10/2020    GLC 78 10/23/2019     COAGS:   Lab Results   Component Value Date    PTT 32 2016    INR 1.10 2019    FIBR 786 (H) 2016     POC:   Lab Results   Component Value Date    BGM 97 2020     OTHER:   Lab Results   Component Value Date    PH 7.40 2015    LACT 1.0 2019    A1C 5.0 2019    CAROL 9.6 04/10/2020    PHOS 3.2 10/08/2017    MAG 1.9 2018    ALBUMIN 2.9 (L) 2019    PROTTOTAL 7.6 2019    ALT 68 (H) 2019    AST 50 (H) 2019    ALKPHOS 205 (H) 2019    BILITOTAL <0.1 (L) 2019    LIPASE 129 2018    ALEX 32 2016    TSH 1.52 2019    T4 0.88 2013    .0 (H) 2016    SED 72 (H) 2015        Preop Vitals    BP Readings from Last 3 Encounters:   20 120/65   20 104/54   04/10/20 137/79    Pulse Readings from Last 3 Encounters:   20 72   20 69   04/10/20 81      Resp Readings from Last 3 Encounters:   20 16   20 16   04/10/20 18    SpO2 Readings from Last 3 Encounters:   20 95%   20 95%   04/10/20 97%     "  Temp Readings from Last 1 Encounters:   05/21/20 37.2  C (98.9  F) (Oral)    Ht Readings from Last 1 Encounters:   05/21/20 1.092 m (3' 7\")      Wt Readings from Last 1 Encounters:   05/21/20 59 kg (130 lb)    Estimated body mass index is 49.43 kg/m  as calculated from the following:    Height as of this encounter: 1.092 m (3' 7\").    Weight as of this encounter: 59 kg (130 lb).     LDA:  ETT 7.5 (Active)   Number of days: 41       Suprapubic Catheter Latex 16 fr (Active)   Number of days: 203        Past Medical History:   Diagnosis Date     Acid reflux disease      Amputation above knee (H)     bilateral     Benign essential hypertension      Blind left eye     due to central retinal artery occlusion     CAD (coronary artery disease)     UA and inferior MI in 2016 s/p PCI     Chronic back pain      Chronic neck pain      Chronic pain syndrome     with fentanyl intrathecal pain pump     Complex sleep apnea syndrome      COPD (chronic obstructive pulmonary disease) (H)      Dysphagia     chronic due to esophageal strictures and multiple previous dilitations      ROGER (generalized anxiety disorder)      History of blood transfusion     x5; no adverse reactions     History of central retinal artery occlusion 2006    left-sided     History of stroke     residual left-sided weakness     Hx of carotid artery stenosis     s/p left carotid stent in 2006 and balloon angioplasty in 2016     MDD (major depressive disorder)      Neurogenic bladder     SPT in place     JOSE (obstructive sleep apnea)      Osteoporosis      PAD (peripheral artery disease) (H)      Peripheral neuropathy      Person who has had sex change operation     male to female     Seizure disorder (H)     many years since last grand mal; daily, brief petit mals     Sickle cell trait (H)      Type 2 diabetes mellitus (H)       Past Surgical History:   Procedure Laterality Date     AMPUTATE LEG ABOVE KNEE Left 6/11/2016    Procedure: AMPUTATE LEG ABOVE KNEE;  " Surgeon: Mello Rodriguez MD;  [facility]     AMPUTATE LEG BELOW KNEE Right 11/7/2016    Procedure: AMPUTATE LEG BELOW KNEE;  Surgeon: Savannah Durant MD;  [facility]     AMPUTATE REVISION STUMP LOWER EXTREMITY Right 11/11/2016    Procedure: AMPUTATE REVISION STUMP LOWER EXTREMITY;  Surgeon: Savannah Durant MD;  [facility]     AMPUTATE REVISION STUMP LOWER EXTREMITY Right 11/16/2016    Procedure: AMPUTATE REVISION STUMP LOWER EXTREMITY;  Surgeon: Savannah Durant MD;  [facility]     AMPUTATE TOE(S) Right 1/5/2016    Procedure: AMPUTATE TOE(S);  Surgeon: Mello Gaines DPM;  Location: [facility]     ANGIOGRAM Bilateral 11/21/2014    Procedure: ANGIOGRAM;  Surgeon: Savannah Durant MD;  [facility]     ANGIOGRAM Left 1/16/2015    Procedure: ANGIOGRAM;  Surgeon: Savannah Durant MD;  [facility]     ANGIOGRAM Bilateral 9/14/2015    Procedure: ANGIOGRAM;  Surgeon: Savannah Durant MD;  [facility]     ANGIOGRAM Left 10/12/2015    Procedure: ANGIOGRAM;  Surgeon: Savannah Durant MD;  [facility]     ANGIOGRAM Right 6/6/2016    Procedure: ANGIOGRAM;  Surgeon: Savannah Durant MD;  [facility]     ANGIOPLASTY Right 6/6/2016    Procedure: ANGIOPLASTY;  Surgeon: Savannah Durant MD;  [facility]     APPENDECTOMY       BREAST BIOPSY, RT/LT Right     benign     CATARACT IOL, RT/LT Right      CHOLECYSTECTOMY       COLONOSCOPY N/A 8/25/2014    Procedure: COLONOSCOPY;  Surgeon: Mello Ferrer MD;  [facility]     COLONOSCOPY WITH CO2 INSUFFLATION N/A 8/20/2014    Procedure: COLONOSCOPY WITH CO2 INSUFFLATION;  Surgeon: Duane, William Charles, MD;  Location: [facility]     CYSTOSCOPY, INTRAVESICAL INJECTION N/A 10/31/2019    Procedure: CYSTOSCOPY, BOTOX INJECTION, Suprapubic Catheter Exchange;  Surgeon: Loki Gordon MD;  [facility]     CYSTOSTOMY, INSERT TUBE SUPRAPUBIC, COMBINED N/A 1/16/2018    Procedure: COMBINED CYSTOSTOMY, INSERT TUBE SUPRAPUBIC;  Cystoscopy, Intraoperative Ultrasound,  Suprapubic Tube Placement;  Surgeon: Keanu Dawson MD;  [facility]     ENDARTERECTOMY FEMORAL  5/23/2014    Procedure: ENDARTERECTOMY FEMORAL;  Surgeon: Jason Joshi MD;  [facility]     ESOPHAGOSCOPY, GASTROSCOPY, DUODENOSCOPY (EGD), COMBINED  12/14/2012    Procedure: COMBINED ESOPHAGOSCOPY, GASTROSCOPY, DUODENOSCOPY (EGD), BIOPSY SINGLE OR MULTIPLE;  ESOPHAGOSCOPY, GASTROSCOPY, DUODENOSCOPY (EGD), DILATATION ;  Surgeon: Elizabeth Stevenson MD;  Location:  GI     ESOPHAGOSCOPY, GASTROSCOPY, DUODENOSCOPY (EGD), COMBINED  12/31/2013    Procedure: COMBINED ESOPHAGOSCOPY, GASTROSCOPY, DUODENOSCOPY (EGD), BIOPSY SINGLE OR MULTIPLE;;  Surgeon: Clemente Lopez MD;  Location:  GI     ESOPHAGOSCOPY, GASTROSCOPY, DUODENOSCOPY (EGD), COMBINED  4/1/2014    Procedure: COMBINED ESOPHAGOSCOPY, GASTROSCOPY, DUODENOSCOPY (EGD);;  Surgeon: Clemente Lopez MD;  Location:  GI     ESOPHAGOSCOPY, GASTROSCOPY, DUODENOSCOPY (EGD), COMBINED  6/28/2014    Procedure: COMBINED ESOPHAGOSCOPY, GASTROSCOPY, DUODENOSCOPY (EGD);  Surgeon: Clemente Lopez MD;  Location:  GI     ESOPHAGOSCOPY, GASTROSCOPY, DUODENOSCOPY (EGD), COMBINED N/A 8/20/2014    Procedure: COMBINED ESOPHAGOSCOPY, GASTROSCOPY, DUODENOSCOPY (EGD), BIOPSY SINGLE OR MULTIPLE;  Surgeon: Duane, William Charles, MD;  Location:  OR     ESOPHAGOSCOPY, GASTROSCOPY, DUODENOSCOPY (EGD), COMBINED N/A 8/22/2014    Procedure: COMBINED ESOPHAGOSCOPY, GASTROSCOPY, DUODENOSCOPY (EGD), BIOPSY SINGLE OR MULTIPLE;  Surgeon: Mello Ferrer MD;  Location:  GI     ESOPHAGOSCOPY, GASTROSCOPY, DUODENOSCOPY (EGD), COMBINED N/A 10/2/2014    Procedure: COMBINED ESOPHAGOSCOPY, GASTROSCOPY, DUODENOSCOPY (EGD), BIOPSY SINGLE OR MULTIPLE;  Surgeon: Remy Haskins MD;  Location:  GI     ESOPHAGOSCOPY, GASTROSCOPY, DUODENOSCOPY (EGD), COMBINED Left 12/15/2014    Procedure: COMBINED ESOPHAGOSCOPY, GASTROSCOPY, DUODENOSCOPY (EGD), BIOPSY SINGLE OR MULTIPLE;   Surgeon: Remy Haskins MD;  [facility]     ESOPHAGOSCOPY, GASTROSCOPY, DUODENOSCOPY (EGD), COMBINED N/A 2/25/2015    Procedure: COMBINED ENDOSCOPIC ULTRASOUND, ESOPHAGOSCOPY, GASTROSCOPY, DUODENOSCOPY (EGD), FINE NEEDLE ASPIRATE/BIOPSY;  Surgeon: Clemente Lugo MD;  [facility]     ESOPHAGOSCOPY, GASTROSCOPY, DUODENOSCOPY (EGD), COMBINED Left 2/25/2015    Procedure: COMBINED ESOPHAGOSCOPY, GASTROSCOPY, DUODENOSCOPY (EGD), BIOPSY SINGLE OR MULTIPLE;  Surgeon: Clemente Lugo MD;  [facility]     ESOPHAGOSCOPY, GASTROSCOPY, DUODENOSCOPY (EGD), COMBINED N/A 9/25/2016    Procedure: COMBINED ESOPHAGOSCOPY, GASTROSCOPY, DUODENOSCOPY (EGD);  Surgeon: Aziza Patiño MD;  [facility]     ESOPHAGOSCOPY, GASTROSCOPY, DUODENOSCOPY (EGD), COMBINED N/A 1/18/2017    Procedure: COMBINED ESOPHAGOSCOPY, GASTROSCOPY, DUODENOSCOPY (EGD), BIOPSY SINGLE OR MULTIPLE;  Surgeon: Clemente Lopez MD;  [facility]     ESOPHAGOSCOPY, GASTROSCOPY, DUODENOSCOPY (EGD), COMBINED N/A 11/26/2017    Procedure: COMBINED ESOPHAGOSCOPY, GASTROSCOPY, DUODENOSCOPY (EGD), REMOVE FOREIGN BODY;  Esophagogastroduodenoscopy with foreign body extraction  ;  Surgeon: Herberth Castrejon MD;  [facility]     ESOPHAGOSCOPY, GASTROSCOPY, DUODENOSCOPY (EGD), COMBINED N/A 11/26/2017    Procedure: COMBINED ESOPHAGOSCOPY, GASTROSCOPY, DUODENOSCOPY (EGD), REMOVE FOREIGN BODY;;  Surgeon: Herberth Castrejon MD;  [facility]     ESOPHAGOSCOPY, GASTROSCOPY, DUODENOSCOPY (EGD), COMBINED N/A 4/10/2020    Procedure: ESOPHAGOGASTRODUODENOSCOPY (EGD);  Surgeon: Yael Cabral MD;  [facility]     FASCIOTOMY LOWER EXTREMITY Left 6/10/2016    Procedure: FASCIOTOMY LOWER EXTREMITY;  Surgeon: Mello Rodriguez MD;  [facility]     HC CAPSULE ENDOSCOPY N/A 8/25/2014    Procedure: CAPSULE/PILL CAM ENDOSCOPY;  Surgeon: Remy Haskins MD;  [facility]     HC CAPSULE ENDOSCOPY N/A 10/2/2014    Procedure:  "CAPSULE/PILL CAM ENDOSCOPY;  Surgeon: Remy Haskins MD;  Location:  GI     ORTHOPEDIC SURGERY      broken wrist repair     SEX TRANSFORMATION SURGERY, MALE TO FEMALE  1974 1974     SINUS SURGERY      cyst removed     TONSILLECTOMY       VASCULAR SURGERY  2006    Left carotid stent      Allergies   Allergen Reactions     Bee Venom Anaphylaxis     Penicillins Anaphylaxis     Dilantin [Phenytoin] Other (See Comments)     Generic dilantin only per pt     Iodine Hives     Novocaine [Procaine] Hives     Tositumomab Unknown        Anesthesia Evaluation     . Pt has had prior anesthetic. Type: General    No history of anesthetic complications          ROS/MED HX    ENT/Pulmonary: Comment: [place] - uses \"Pocket talker\" to enhance hearing.      (+)sleep apnea, other ENT (Wears corrective lenses.  Cannot see out of left eye.  Limited right eye vision with no peripheral vision. Legally blind. )- tobacco use (Quit 15 years ago.  Smoked 1-2 PPD for 30 years.), Past use Moderate Persistent asthma Last exacerbation: > 1year ago,Treatment: Inhaled steroids and Nebulizer daily,  COPD, uses CPAP , . .    Neurologic:     (+)neuropathy - fingers, seizures last seizure:  Petit Mal 4-5  time per day.  Grand Mal>15 years ago. CVA (Cerebral vascular dz,s/p stent to left carotid artery 2005.) date: 2005, 2007 & 2008 with deficits- Presented with left side weakness and vision loss.    ,     Cardiovascular: Comment: PVD and thrombosis of LLE, s/p left AKA 6/6/2016   PVD RLE, s/p right AKA 11/23/2016.        (+) hypertension--CAD, -past MI (Inferior MI 9/2016.),-stent (s/p COLLEEN to pRCA and mRCA),9/2016   2 Drug Eluting Stent .. Taking blood thinners Pt has received instructions: . CHF (Plavix started September 2016 post stent placement.   ASA daily.) etiology: diastolic heart failure Last EF: 55-60% date: 9/2016 . . :. . Previous cardiac testing Echodate:results:date: results:ECG reviewed date: results:Cath date: results:        " "  METS/Exercise Tolerance: Comment: Patient lives in assisted living.  Is able to dress herself, but does need help with showering. 1 - Eating, dressing   Hematologic:     (+) History of blood clots (Left LE  prior to AKA.) pt is not anticoagulated, Anemia, History of Transfusion previous transfusion reaction - \"hives\" 2012, -      Musculoskeletal: Comment: DDD  Chronic low back pain  (+) arthritis (hands),  -       GI/Hepatic: Comment: Chronic dysphagia   Esophageal strictures and previous dilations.  Coming for food impaction    (+) GERD Asymptomatic on medication, appendicitis (s/p appendectomy), cholecystitis/cholelithiasis (s/p cholecystectomy),       Renal/Genitourinary:     (+) Other Renal/ Genitourinary, Frequent UTI's      Endo:     (+) type II DM Last HgA1c: 5.0 date: 9/2016 Not using insulin - not using insulin pump Normal glucose range: 's not previously admitted for DM/DKA .      Psychiatric:     (+) psychiatric history anxiety, depression and other (comment) (Schizoaffective disorder)      Infectious Disease:  - neg infectious disease ROS       Malignancy:      - no malignancy   Other: Comment: Chronic pain from neuropathy and PVD  Intrathecal pump with bupivacaine and fentanyl located in right lower back.   (+) No chance of pregnancy C-spine cleared: N/A, H/O Chronic Pain,H/O chronic opiod use , other significant disability Wheelchair bound                       PHYSICAL EXAM:   Mental Status/Neuro: A/A/O   Airway: Facies: Feasible  Mallampati: I  Mouth/Opening: Full  TM distance: > 6 cm  Neck ROM: Full   Respiratory: Auscultation: CTAB     Resp. Rate: Normal     Resp. Effort: Normal      CV: Rhythm: Regular  Rate: Age appropriate  Heart: Normal Sounds  Edema: None   Comments:      Dental: Normal Dentition                Assessment:   ASA SCORE: 4    H&P: History and physical reviewed and following examination; no interval change.   Smoking Status:  Non-Smoker/Unknown   NPO Status: NPO " Appropriate     Plan:   Anes. Type:  MAC   Pre-Medication: None   Induction:  IV (Standard)   Airway: Native Airway   Access/Monitoring: PIV   Maintenance: Propofol Sedation     Postop Plan:   Postop Pain: None  Postop Sedation/Airway: Not planned  Disposition: Outpatient     PONV Management:   Adult Risk Factors: Female, Non-Smoker   Prevention: Ondansetron, Propofol     CONSENT: Direct conversation   Plan and risks discussed with: Patient   Blood Products: Consent Deferred (Minimal Blood Loss)                   Miah Alexandra DO

## 2020-05-21 NOTE — OR NURSING
Patient requesting pain medication for home. Dr. Mobley paged and he returned call. Plan: Patient to follow up with primary care doctor for pain management. Patient verbalizes understanding.

## 2020-05-21 NOTE — ANESTHESIA CARE TRANSFER NOTE
Patient: Sonya Green    Procedure(s):  CYSTOSCOPY, WITH BOTULINUM TOXIN INJECTION    Diagnosis: Neurogenic bladder [N31.9]  Diagnosis Additional Information: No value filed.    Anesthesia Type:   MAC     Note:  Airway :Room Air  Patient transferred to:Phase II  Comments: Patient transferred to PACU. VSS. Denies pain, denies nausea. Report given to receiving RN.Handoff Report: Identifed the Patient, Identified the Reponsible Provider, Reviewed the pertinent medical history, Discussed the surgical course, Reviewed Intra-OP anesthesia mangement and issues during anesthesia, Set expectations for post-procedure period and Allowed opportunity for questions and acknowledgement of understanding      Vitals: (Last set prior to Anesthesia Care Transfer)    [facility] VITALS  5/21/2020 1529 - 5/21/2020 1605      5/21/2020             Pulse:  71    SpO2:  100 %    Resp Rate (observed): 14                Electronically Signed By: VICTOR M Heck CRNA  May 21, 2020  4:05 PM

## 2020-05-21 NOTE — BRIEF OP NOTE
[facility], [place]    Brief Operative Note    Pre-operative diagnosis: Neurogenic bladder [N31.9]  Post-operative diagnosis Same as pre-operative diagnosis    Procedure: Procedure(s):  CYSTOSCOPY, WITH BOTULINUM TOXIN INJECTION  Surgeon: Surgeon(s) and Role:     * Loki Gordon MD - Primary     * Dylan Mobley MD - Resident - Assisting     * Ghazala Hebert MD - Fellow - Assisting  Anesthesia: Monitor Anesthesia Care   Estimated blood loss: Minimal  Drains: 16 Fr suprapubic catheter exchanged, 10 ml in the balloon  Specimens: * No specimens in log *  Findings:   None. Application of 300 U bladder botox  Complications: None.  Implants: * No implants in log *

## 2020-05-22 NOTE — ANESTHESIA POSTPROCEDURE EVALUATION
Anesthesia POST Procedure Evaluation    Patient: Sonya Foote   MRN:     [id number] Gender:   female   Age:    70 year old :      1950        Preoperative Diagnosis: Neurogenic bladder [N31.9]   Procedure(s):  CYSTOSCOPY, WITH BOTULINUM TOXIN INJECTION   Postop Comments: No value filed.     Anesthesia Type: MAC       Disposition: Outpatient   Postop Pain Control: Uneventful            Sign Out: Well controlled pain   PONV: No   Neuro/Psych: Uneventful            Sign Out: Acceptable/Baseline neuro status   Airway/Respiratory: Uneventful            Sign Out: Acceptable/Baseline resp. status   CV/Hemodynamics: Uneventful            Sign Out: Acceptable CV status   Other NRE: NONE   DID A NON-ROUTINE EVENT OCCUR? No         Last Anesthesia Record Vitals:  [facility] VITALS  2020 1529 - 2020 1629      2020             Pulse:  71    SpO2:  100 %    Resp Rate (observed):  (!) 4          Last PACU Vitals:  Vitals Value Taken Time   BP     Temp     Pulse     Resp     SpO2     Temp src     NIBP 109/69 2020  3:55 PM   Pulse 71 2020  4:00 PM   SpO2 100 % 2020  4:00 PM   Resp     Temp 31.3  C (88.3  F) 2020  3:58 PM   Ht Rate 69 2020  3:57 PM   Temp 2           Electronically Signed By: Sarah Wachter, MD, May 21, 2020, 10:27 PM

## 2020-05-24 DIAGNOSIS — I25.10 CORONARY ARTERY DISEASE INVOLVING NATIVE CORONARY ARTERY OF NATIVE HEART WITHOUT ANGINA PECTORIS: ICD-10-CM

## 2020-05-25 RX ORDER — NITROGLYCERIN 0.4 MG/1
TABLET SUBLINGUAL
Qty: 25 TABLET | Refills: 0 | Status: SHIPPED | OUTPATIENT
Start: 2020-05-25 | End: 2020-06-03

## 2020-05-29 ENCOUNTER — OFFICE VISIT (OUTPATIENT)
Dept: INTERNAL MEDICINE | Facility: CLINIC | Age: 70
End: 2020-05-29
Payer: COMMERCIAL

## 2020-05-29 VITALS
HEART RATE: 67 BPM | DIASTOLIC BLOOD PRESSURE: 58 MMHG | TEMPERATURE: 97.4 F | SYSTOLIC BLOOD PRESSURE: 98 MMHG | BODY MASS INDEX: 49.43 KG/M2 | RESPIRATION RATE: 15 BRPM | OXYGEN SATURATION: 98 % | WEIGHT: 130 LBS

## 2020-05-29 DIAGNOSIS — Z09 HOSPITAL DISCHARGE FOLLOW-UP: Primary | ICD-10-CM

## 2020-05-29 DIAGNOSIS — S78.119A AMPUTATION ABOVE KNEE (H): ICD-10-CM

## 2020-05-29 DIAGNOSIS — N31.9 NEUROGENIC BLADDER: ICD-10-CM

## 2020-05-29 DIAGNOSIS — G89.4 CHRONIC PAIN SYNDROME: Chronic | ICD-10-CM

## 2020-05-29 DIAGNOSIS — K59.00 CONSTIPATION, UNSPECIFIED CONSTIPATION TYPE: ICD-10-CM

## 2020-05-29 DIAGNOSIS — E11.59 TYPE 2 DIABETES MELLITUS WITH OTHER CIRCULATORY COMPLICATION, WITHOUT LONG-TERM CURRENT USE OF INSULIN (H): ICD-10-CM

## 2020-05-29 PROCEDURE — 99214 OFFICE O/P EST MOD 30 MIN: CPT | Performed by: INTERNAL MEDICINE

## 2020-05-29 RX ORDER — LACTOSE-REDUCED FOOD
1 LIQUID (ML) ORAL
Qty: 60 BOTTLE | Refills: 0 | Status: SHIPPED | OUTPATIENT
Start: 2020-05-29 | End: 2020-12-15

## 2020-05-29 RX ORDER — LACTULOSE 10 G/15ML
20 SOLUTION ORAL PRN
Qty: 236 ML | Refills: 0 | Status: SHIPPED | OUTPATIENT
Start: 2020-05-29 | End: 2020-11-17

## 2020-05-29 NOTE — PROGRESS NOTES
Subjective     Sonya Foote is a 70 year old female who presents to clinic today for the following health issues:    HPI     Hospital Follow-up Visit:    Hospital/Nursing Home/IP Rehab Facility: [facility]  Date of Admission:  05/21/20  Date of Discharge: 05/21/20  Reason(s) for Admission: Cystoscopy with botulinum toxin injection       Was your hospitalization related to COVID-19? No   Problems taking medications regularly:  None  Medication changes since discharge: None  Problems adhering to non-medication therapy:  None    Summary of hospitalization:  Discharge summary unavailable  Diagnostic Tests/Treatments reviewed.  Follow up needed: Urology as needed  Other Healthcare Providers Involved in Patient s Care:         None  Update since discharge: stable.       Post Discharge Medication Reconciliation: discharge medications reconciled, continue medications without change.  Plan of care communicated with patient                Other concerns:  1. Patient requesting new order for nutritional supplement drinks sent to [facility] Fax [phone].  Drink 3 supplements daily to help with maintaining weight  2. Reliable medical supply requesting documentation of continued need for a wheelchair   3. Ongoing issues with constipation- currently using metamucil and prune juice       Patient Active Problem List   Diagnosis     Chronic pain syndrome     Morbid obesity (H)     History of blood transfusion     History of central retinal artery occlusion     COPD (chronic obstructive pulmonary disease) (H)     Type 2 diabetes mellitus (H)     Benign essential hypertension     History of stroke     Sickle cell trait (H)     MDD (major depressive disorder)     Seizure disorder (H)     Chronic back pain     Chronic neck pain     ROGER (generalized anxiety disorder)     Person who has had sex change operation     Osteoporosis     PAD (peripheral artery disease) (H)     Peripheral neuropathy     JOSE (obstructive sleep  apnea)     Amputation above knee (H)     Blind left eye     Neurogenic bladder     Acid reflux disease     Hx of carotid artery stenosis     Complex sleep apnea syndrome     Dysphagia     CAD (coronary artery disease)     Past Surgical History:   Procedure Laterality Date     AMPUTATE LEG ABOVE KNEE Left 6/11/2016    Procedure: AMPUTATE LEG ABOVE KNEE;  Surgeon: Mello Rodriguez MD;  [facility]     AMPUTATE LEG BELOW KNEE Right 11/7/2016    Procedure: AMPUTATE LEG BELOW KNEE;  Surgeon: Savannah Durant MD;  [facility]     AMPUTATE REVISION STUMP LOWER EXTREMITY Right 11/11/2016    Procedure: AMPUTATE REVISION STUMP LOWER EXTREMITY;  Surgeon: Savannah Durant MD;  [facility]     AMPUTATE REVISION STUMP LOWER EXTREMITY Right 11/16/2016    Procedure: AMPUTATE REVISION STUMP LOWER EXTREMITY;  Surgeon: Savannah Durant MD;  [facility]     AMPUTATE TOE(S) Right 1/5/2016    Procedure: AMPUTATE TOE(S);  Surgeon: Mello Gaines DPM;  Location:  [facility]     ANGIOGRAM Bilateral 11/21/2014    Procedure: ANGIOGRAM;  Surgeon: Savannah Durant MD;  [facility]     ANGIOGRAM Left 1/16/2015    Procedure: ANGIOGRAM;  Surgeon: Savannah Durant MD;  [facility]     ANGIOGRAM Bilateral 9/14/2015    Procedure: ANGIOGRAM;  Surgeon: Savannah Durant MD;  [facility]     ANGIOGRAM Left 10/12/2015    Procedure: ANGIOGRAM;  Surgeon: Savannah Durant MD;  [facility]     ANGIOGRAM Right 6/6/2016    Procedure: ANGIOGRAM;  Surgeon: Savannah Durant MD;  [facility]     ANGIOPLASTY Right 6/6/2016    Procedure: ANGIOPLASTY;  Surgeon: Savannah Durant MD;  [facility]     APPENDECTOMY       BREAST BIOPSY, RT/LT Right     benign     CATARACT IOL, RT/LT Right      CHOLECYSTECTOMY       COLONOSCOPY N/A 8/25/2014    Procedure: COLONOSCOPY;  Surgeon: Mello Ferrer MD;  [facility]     COLONOSCOPY WITH CO2 INSUFFLATION N/A 8/20/2014    Procedure: COLONOSCOPY WITH CO2 INSUFFLATION;  Surgeon: Duane, William Charles, MD;   [facility]     CYSTOSCOPY, INJECT BOTOX N/A 5/21/2020    Procedure: CYSTOSCOPY, WITH BOTULINUM TOXIN INJECTION;  Surgeon: Loki Gordon MD;  [facility]     CYSTOSCOPY, INTRAVESICAL INJECTION N/A 10/31/2019    Procedure: CYSTOSCOPY, BOTOX INJECTION, Suprapubic Catheter Exchange;  Surgeon: Loki Gordon MD;  [facility]     CYSTOSTOMY, INSERT TUBE SUPRAPUBIC, COMBINED N/A 1/16/2018    Procedure: COMBINED CYSTOSTOMY, INSERT TUBE SUPRAPUBIC;  Cystoscopy, Intraoperative Ultrasound, Suprapubic Tube Placement;  Surgeon: Keanu Dawson MD;  [facility]     ENDARTERECTOMY FEMORAL  5/23/2014    Procedure: ENDARTERECTOMY FEMORAL;  Surgeon: Jason Joshi MD;  [facility]     ESOPHAGOSCOPY, GASTROSCOPY, DUODENOSCOPY (EGD), COMBINED  12/14/2012    Procedure: COMBINED ESOPHAGOSCOPY, GASTROSCOPY, DUODENOSCOPY (EGD), BIOPSY SINGLE OR MULTIPLE;  ESOPHAGOSCOPY, GASTROSCOPY, DUODENOSCOPY (EGD), DILATATION ;  Surgeon: Elizabeth Stevenson MD;  Location:  GI     ESOPHAGOSCOPY, GASTROSCOPY, DUODENOSCOPY (EGD), COMBINED  12/31/2013    Procedure: COMBINED ESOPHAGOSCOPY, GASTROSCOPY, DUODENOSCOPY (EGD), BIOPSY SINGLE OR MULTIPLE;;  Surgeon: Clemente Lopez MD;  Location:  GI     ESOPHAGOSCOPY, GASTROSCOPY, DUODENOSCOPY (EGD), COMBINED  4/1/2014    Procedure: COMBINED ESOPHAGOSCOPY, GASTROSCOPY, DUODENOSCOPY (EGD);;  Surgeon: Clemente Lopez MD;  [facility]     ESOPHAGOSCOPY, GASTROSCOPY, DUODENOSCOPY (EGD), COMBINED  6/28/2014    Procedure: COMBINED ESOPHAGOSCOPY, GASTROSCOPY, DUODENOSCOPY (EGD);  Surgeon: Clemente Lopez MD;  Location:  GI     ESOPHAGOSCOPY, GASTROSCOPY, DUODENOSCOPY (EGD), COMBINED N/A 8/20/2014    Procedure: COMBINED ESOPHAGOSCOPY, GASTROSCOPY, DUODENOSCOPY (EGD), BIOPSY SINGLE OR MULTIPLE;  Surgeon: Duane, William Charles, MD;  [facility]     ESOPHAGOSCOPY, GASTROSCOPY, DUODENOSCOPY (EGD), COMBINED N/A 8/22/2014    Procedure: COMBINED ESOPHAGOSCOPY,  GASTROSCOPY, DUODENOSCOPY (EGD), BIOPSY SINGLE OR MULTIPLE;  Surgeon: Mello Ferrer MD;  [facility]     ESOPHAGOSCOPY, GASTROSCOPY, DUODENOSCOPY (EGD), COMBINED N/A 10/2/2014    Procedure: COMBINED ESOPHAGOSCOPY, GASTROSCOPY, DUODENOSCOPY (EGD), BIOPSY SINGLE OR MULTIPLE;  Surgeon: Remy Haskins MD;  [facility]     ESOPHAGOSCOPY, GASTROSCOPY, DUODENOSCOPY (EGD), COMBINED Left 12/15/2014    Procedure: COMBINED ESOPHAGOSCOPY, GASTROSCOPY, DUODENOSCOPY (EGD), BIOPSY SINGLE OR MULTIPLE;  Surgeon: Remy Haskins MD;  [facility]     ESOPHAGOSCOPY, GASTROSCOPY, DUODENOSCOPY (EGD), COMBINED N/A 2/25/2015    Procedure: COMBINED ENDOSCOPIC ULTRASOUND, ESOPHAGOSCOPY, GASTROSCOPY, DUODENOSCOPY (EGD), FINE NEEDLE ASPIRATE/BIOPSY;  Surgeon: Clemente Lugo MD;  [facility]     ESOPHAGOSCOPY, GASTROSCOPY, DUODENOSCOPY (EGD), COMBINED Left 2/25/2015    Procedure: COMBINED ESOPHAGOSCOPY, GASTROSCOPY, DUODENOSCOPY (EGD), BIOPSY SINGLE OR MULTIPLE;  Surgeon: Clemente Lugo MD;  [facility]     ESOPHAGOSCOPY, GASTROSCOPY, DUODENOSCOPY (EGD), COMBINED N/A 9/25/2016    Procedure: COMBINED ESOPHAGOSCOPY, GASTROSCOPY, DUODENOSCOPY (EGD);  Surgeon: Aziza Patiño MD;  [facility]     ESOPHAGOSCOPY, GASTROSCOPY, DUODENOSCOPY (EGD), COMBINED N/A 1/18/2017    Procedure: COMBINED ESOPHAGOSCOPY, GASTROSCOPY, DUODENOSCOPY (EGD), BIOPSY SINGLE OR MULTIPLE;  Surgeon: Clemente Lopez MD;  [facility]     ESOPHAGOSCOPY, GASTROSCOPY, DUODENOSCOPY (EGD), COMBINED N/A 11/26/2017    Procedure: COMBINED ESOPHAGOSCOPY, GASTROSCOPY, DUODENOSCOPY (EGD), REMOVE FOREIGN BODY;  Esophagogastroduodenoscopy with foreign body extraction  ;  Surgeon: Herberth Castrejon MD;  [facility]     ESOPHAGOSCOPY, GASTROSCOPY, DUODENOSCOPY (EGD), COMBINED N/A 11/26/2017    Procedure: COMBINED ESOPHAGOSCOPY, GASTROSCOPY, DUODENOSCOPY (EGD), REMOVE FOREIGN BODY;;  Surgeon: Herberth Castrejon MD;  [facility]     ESOPHAGOSCOPY, GASTROSCOPY, DUODENOSCOPY (EGD), COMBINED N/A 4/10/2020    Procedure: ESOPHAGOGASTRODUODENOSCOPY (EGD);  Surgeon: Yael Cabral MD;  [facility]     FASCIOTOMY LOWER EXTREMITY Left 6/10/2016    Procedure: FASCIOTOMY LOWER EXTREMITY;  Surgeon: Mello Rodriguez MD;  [facility]     HC CAPSULE ENDOSCOPY N/A 8/25/2014    Procedure: CAPSULE/PILL CAM ENDOSCOPY;  Surgeon: Remy Haskins MD;  [facility]     HC CAPSULE ENDOSCOPY N/A 10/2/2014    Procedure: CAPSULE/PILL CAM ENDOSCOPY;  Surgeon: Remy Haskins MD;  [facility]     ORTHOPEDIC SURGERY      broken wrist repair     SEX TRANSFORMATION SURGERY, MALE TO FEMALE  1974 1974     SINUS SURGERY      cyst removed     TONSILLECTOMY       VASCULAR SURGERY  2006    Left carotid stent       Social History     Tobacco Use     Smoking status: Current Every Day Smoker     Packs/day: 0.25     Years: 30.00     Pack years: 7.50     Types: Cigarettes, Cigars     Smokeless tobacco: Never Used     Tobacco comment: about 5-10 cigarettes per day.   Substance Use Topics     Alcohol use: No     Alcohol/week: 0.0 standard drinks     Family History   Problem Relation Age of Onset     Dementia Mother      Diabetes Mother         type unknown     Coronary Artery Disease Mother         MI     Glaucoma Father      Diabetes Father         type unknown     Heart Failure Father      Schizophrenia Brother      Depression Brother      Suicide Sister      Depression Sister      Diabetes Sister      Ovarian Cancer Maternal Aunt      Leukemia Maternal Aunt      Diabetes Maternal Grandmother      Diabetes Maternal Grandfather      Diabetes Paternal Grandmother      Diabetes Paternal Grandfather      Breast Cancer Sister      Cerebrovascular Disease Brother      Colon Cancer No family hx of      Macular Degeneration No family hx of          Current Outpatient Medications   Medication Sig Dispense Refill     ACETAMINOPHEN PO Take 1,000 mg by  mouth every 6 hours as needed for pain Not to exceed 4000 mg/day       ADVAIR DISKUS 250-50 MCG/DOSE diskus inhaler Inhale 1 puff into the lungs 2 times daily 60 Inhaler 11     albuterol (PROVENTIL) (2.5 MG/3ML) 0.083% neb solution INHALE 1 VIAL VIA NEBULIZER EVERY 6 HOURS AS NEEDED 360 mL 11     alendronate (FOSAMAX) 70 MG tablet Take 1 tablet (70 mg) by mouth with 8oz water every 7 days 30 minutes before breakfast and remain upright during this time. 12 tablet 3     aspirin EC 81 MG EC tablet Take 1 tablet (81 mg) by mouth daily 90 tablet 3     atorvastatin (LIPITOR) 40 MG tablet TAKE 1 TAB BY MOUTH ONCE DAILY 30 tablet 11     blood glucose monitoring (ONE TOUCH ULTRA 2) meter device kit Use to test blood sugars 3 times daily or as directed. 1 kit 0     blood glucose monitoring (ONE TOUCH ULTRA) test strip Use to test blood sugars 3 times daily or as directed. 3 Box 3     blood glucose monitoring (ONE TOUCH ULTRASOFT) lancets Use to test blood sugar 3 times daily or as directed. 100 each PRN     brimonidine (ALPHAGAN) 0.2 % ophthalmic solution Place 1 drop into the right eye 2 times daily 1 Bottle 11     capsaicin-menthol-methyl sal 0.025-1-12 % external cream Apply 1 Application topically daily as needed (topical pain) 56.6 g 1     Cholecalciferol (VITAMIN D) 2000 UNITS tablet Take 2,000 Units by mouth daily. 100 tablet 3     clotrimazole (LOTRIMIN) 1 % cream INSERT 1 APPLICATORFUL VAGINALLY TWICE A DAY FOR VAGINAL PAIN 12 g 0     clotrimazole (LOTRIMIN) 1 % external cream Apply topically 2 times daily 12 g 0     diclofenac (VOLTAREN) 1 % GEL topical gel Apply 2 g topically 4 times daily to hands 100 g 11     dorzolamide (TRUSOPT) 2 % ophthalmic solution Place 1 drop into the right eye 2 times daily 1 Bottle 11     escitalopram (LEXAPRO) 10 MG tablet TAKE 1 TAB BY MOUTH ONCE DAILY 30 tablet 11     ferrous sulfate (IRON) 325 (65 FE) MG tablet Take 1 tablet (325 mg) by mouth 2 times daily With meals 60 tablet 2      fluticasone (FLONASE) 50 MCG/ACT nasal spray Spray 1 spray into both nostrils daily 15.8 mL 11     lactulose (CHRONULAC) 10 GM/15ML solution Take 20 g by mouth as needed for constipation       latanoprost (XALATAN) 0.005 % ophthalmic solution Place 1 drop into both eyes At Bedtime 2.5 mL 11     levETIRAcetam (KEPPRA) 500 MG tablet TAKE 1 TAB BY MOUTH TWICE A DAY 60 tablet 5     Lidocaine (LIDOCARE) 4 % Patch Place 1 patch onto the skin every 24 hours To prevent lidocaine toxicity, patient should be patch free for 12 hrs daily.       lisinopril (PRINIVIL/ZESTRIL) 20 MG tablet TAKE 1 TAB BY MOUTH ONCE DAILY 30 tablet 11     loratadine (CLARITIN) 10 MG tablet Take 1 tablet (10 mg) by mouth daily 30 tablet 1     metFORMIN (GLUCOPHAGE) 500 MG tablet TAKE 1 TAB BY MOUTH IN THE EVENING WITH DINNER 30 tablet 5     metoprolol succinate ER (TOPROL-XL) 50 MG 24 hr tablet TAKE 1 TAB BY MOUTH ONCE DAILY 30 tablet 11     nicotine (NICODERM CQ) 14 MG/24HR 24 hr patch Place 1 patch onto the skin every 24 hours 30 patch 3     nitroGLYcerin (NITROSTAT) 0.4 MG sublingual tablet DISSOLVE ONE TABLET UNDER TONGUE AS NEEDED FOR CHEST PAIN MAY REPEAT EVERY 5 MINUTES FOR 3 DOSES, IF SYMPTOMS PERSIST CALL 911 25 tablet 0     nystatin (MYCOSTATIN) 569660 UNIT/ML suspension Take 5 mLs (500,000 Units) by mouth 4 times daily - swish and spit for 7 days 60 mL 1     oxyCODONE (ROXICODONE) 5 MG tablet TAKE 1 TABLET BY MOUTH EVERY 6 HOURS AS NEEDED  0     pantoprazole (PROTONIX) 40 MG EC tablet TAKE 1 TAB BY MOUTH ONCE DAILY 30 tablet 11     phenytoin (DILANTIN) 100 MG capsule TAKE 2 CAPS (200MG) BY MOUTH TWICE A  capsule 11     polyethylene glycol (MIRALAX/GLYCOLAX) Packet Take 17 g by mouth daily Dissolved in water or juice        pregabalin (LYRICA) 150 MG capsule        pregabalin (LYRICA) 75 MG capsule Take 150 mg by mouth 3 times daily       risperiDONE (RISPERDAL) 0.5 MG tablet TAKE 1 TAB BY MOUTH AT BEDTIME 30 tablet 5     sennosides  (SENOKOT) 8.6 MG tablet Take 1 tablet by mouth 2 times daily        solifenacin (VESICARE) 10 MG tablet TAKE 1 TAB BY MOUTH ONCE DAILY 30 tablet 11     sucralfate (CARAFATE) 1 GM tablet TAKE 1 TAB BY MOUTH FOUR TIMES DAILY. MAY DISSOLVE IN 10ML WATER ISNECESSARY 120 tablet 11     traZODone (DESYREL) 150 MG tablet TAKE 1 TAB BY MOUTH AT BEDTIME 30 tablet 11     umeclidinium (INCRUSE ELLIPTA) 62.5 MCG/INH inhaler Inhale 1 puff into the lungs daily 1 Inhaler 6     VENTOLIN  (90 Base) MCG/ACT inhaler Inhale 2 puffs into the lungs every 6 hours as needed for shortness of breath / dyspnea or wheezing 8.5 g 11     Allergies   Allergen Reactions     Bee Venom Anaphylaxis     Penicillins Anaphylaxis     Dilantin [Phenytoin] Other (See Comments)     Generic dilantin only per pt     Iodine Hives     Novocaine [Procaine] Hives     Tositumomab Unknown     BP Readings from Last 3 Encounters:   05/21/20 (!) 152/66   05/14/20 104/54   04/10/20 137/79    Wt Readings from Last 3 Encounters:   05/21/20 59 kg (130 lb)   05/14/20 59 kg (130 lb)   04/28/20 56.7 kg (125 lb)                  Reviewed and updated as needed this visit by Provider         Review of Systems   Sonya IS  COMPLAINING TODAY OF THE FOLLOWING ISSUES AS OUTLINED IN THE REVIEW OF SYSTEMS BELOW. A COMPLETE REVIEW OF SYSTEMS WAS DONE WITH THIS PATIENT IN REGARDS TO Sonya COMPLAINTS:    Skin: negative  Eyes: negative  Ears/Nose/Throat: negative  Respiratory: No shortness of breath, dyspnea on exertion, cough, or hemoptysis  Cardiovascular: negative  Gastrointestinal: negative        Objective    BP 98/58 (BP Location: Left arm, Patient Position: Chair, Cuff Size: Adult Regular)   Pulse 67   Temp 97.4  F (36.3  C) (Temporal)   Resp 15   Wt 59 kg (130 lb)   SpO2 98%   BMI 49.43 kg/m    Body mass index is 49.43 kg/m .  Physical Exam   GENERAL: alert and no distress  EYES: Eyes grossly normal to inspection, PERRL and conjunctivae and sclerae normal  HENT: ear  canals and TM's normal, nose and mouth without ulcers or lesions  NECK: no adenopathy, no asymmetry, masses, or scars and thyroid normal to palpation  RESP: lungs clear to auscultation - no rales, rhonchi or wheezes  CV: regular rate and rhythm, normal S1 S2, no S3 or S4, no murmur, click or rub, no peripheral edema and peripheral pulses strong  MS: Wheelchair-bound bilateral AKA's  PSYCH: mentation appears normal, affect normal/bright        Assessment & Plan     1. Hospital discharge follow-up  Stable and doing well     2. Neurogenic bladder  Stable and follow-up with Urology as w/o current complaints    3. Type 2 diabetes mellitus with other circulatory complication, without long-term current use of insulin (H)  Lab Results   Component Value Date    A1C 5.0 12/16/2019    A1C 5.0 04/17/2019    A1C 5.1 06/06/2018    A1C 4.4 05/02/2018    A1C 4.5 08/24/2017     Stable on therapy    4. Chronic pain syndrome  Stable as ordered    5. Amputation above knee (H)  Patient still needs motorized wheelchair and will need such for lifetime.    6. Constipation, unspecified constipation type  Refilled per request prn use.  - lactulose (CHRONULAC) 10 GM/15ML solution; Take 30 mLs (20 g) by mouth as needed for constipation  Dispense: 236 mL; Refill: 0  - Nutritional Supplements (ENSURE NUTRITION SHAKE) LIQD; Take 1 Bottle by mouth 2 times daily With meals  Dispense: 60 Bottle; Refill: 0       See Patient Instructions    Return in about 1 month (around 6/29/2020) for Lab Work appointment.    Allan Casey MD  [facility]

## 2020-06-01 ENCOUNTER — NURSE TRIAGE (OUTPATIENT)
Dept: INTERNAL MEDICINE | Facility: CLINIC | Age: 70
End: 2020-06-01

## 2020-06-01 NOTE — TELEPHONE ENCOUNTER
Additional Information    Negative: Sounds like a life-threatening emergency to the triager    Negative: Fainted (passed out), or too weak to stand following large blood loss    Negative: Nosebleed followed nose injury    Negative: Bleeding present > 30 minutes and using correct method of direct pressure    Negative: Bleeding now and second call after being instructed in correct technique of direct pressure    Large amount of blood has been lost (e.g., one cup)    Bleeding recurs 3 or more times in 24 hours despite direct pressure    Negative: Has nasal packing (inserted by health care provider to control bleeding) and now has new rash    Negative: Has nasal packing and now has bleeding around the packing (Exception: few drops or ooze)    Negative: Patient sounds very sick or weak to the triager    Commented on: Lightheadedness or dizziness     Not new    Commented on: Pale skin (pallor) of new onset or worsening     Pt unable to tell, legally blind    Protocols used: NOSEBLEED-A-OH

## 2020-06-01 NOTE — TELEPHONE ENCOUNTER
"Patient calling. Has had Nosebleed for 4-5 days. Does not know when they will stop or start. Lasts about 30 seconds- one minute usually, maybe three times a day. Tries to hold bridge of nose and puts ice on it and that stops it for awhile. Has to change clothes, bleeds onto clothes. Used to see ENT for sinus prolems but does not have their number. Has had nosebleeds before but not like this. Gets tired really quickly after them. Thinks has lost at least a cup of blood. Has COPD and gets tired and SOB quickly- not new. Nosebleeds started after COVID test a couple weeks ago and says they went really deep to do test. Always has lightheaded and dizziness. Unsure if any pale color to skin or blood clots coming from nose, she is legally blind. Reports her nose was \"bleeding like a faucet\" the other day from both nostrils. Reports she is anemic so is worried about losing blood. She does have cough but says has had it for awhile due to her sinuses and has a smokers cough. Patient unable to come to clinic today because has to arrange medical transportation. Scheduled for Vida CABAN for tomorrow and advised to call if anything worsens in the meantime. Is this ok? She does live in nursing home and they have a COVID section but she has not been in contact with them. No fever or rash. Baseline SOB. And has cough but says has had it for awhile due to sinuses and has smokers cough.. She reports her previous COVID test was negative, looks like it was done 5/14.  "

## 2020-06-01 NOTE — TELEPHONE ENCOUNTER
Fine to be seen tomorrow.   I will only be able to do silver nitrate cautery if needed.  Otherwise may need to see ENT.

## 2020-06-02 ENCOUNTER — TELEPHONE (OUTPATIENT)
Dept: INTERNAL MEDICINE | Facility: CLINIC | Age: 70
End: 2020-06-02

## 2020-06-02 ENCOUNTER — OFFICE VISIT (OUTPATIENT)
Dept: INTERNAL MEDICINE | Facility: CLINIC | Age: 70
End: 2020-06-02
Payer: COMMERCIAL

## 2020-06-02 VITALS
SYSTOLIC BLOOD PRESSURE: 98 MMHG | RESPIRATION RATE: 16 BRPM | DIASTOLIC BLOOD PRESSURE: 52 MMHG | TEMPERATURE: 97.9 F | WEIGHT: 130 LBS | OXYGEN SATURATION: 98 % | HEART RATE: 66 BPM | BODY MASS INDEX: 49.43 KG/M2

## 2020-06-02 DIAGNOSIS — R04.0 EPISTAXIS: Primary | ICD-10-CM

## 2020-06-02 DIAGNOSIS — R09.81 CONGESTION OF PARANASAL SINUS: ICD-10-CM

## 2020-06-02 DIAGNOSIS — S78.119A AMPUTATION ABOVE KNEE (H): Primary | ICD-10-CM

## 2020-06-02 PROCEDURE — 99213 OFFICE O/P EST LOW 20 MIN: CPT | Performed by: PHYSICIAN ASSISTANT

## 2020-06-02 NOTE — PATIENT INSTRUCTIONS
Recommend to use Flonase every other day or just a few times a week for allergies.   Suspect nose is dry.  Recommend to continue saline spray or netti pot as need to help moisturize the nose.  Ok to use vaseline in the nasal passages for moisturizing.          Your provider has referred you to: [facility]: [place] [place] [phone]   [email or web address]          Call and see ENT again

## 2020-06-02 NOTE — PROGRESS NOTES
Subjective     Sonya Foote is a 70 year old female who presents to clinic today for the following health issues:    HPI   Concern - Nosebleeds  Onset: intermittently for 4 weeks    Description:   Nose bleeds primarily on the right side but occasionally on the left    Intensity: severe    Progression of Symptoms:  worsening    Accompanying Signs & Symptoms:  Denies any pain, has some sinus congestion. Fatigue, lack of appetite    Previous history of similar problem:   Some hx of minor nosebleeds    Precipitating factors:   Worsened by: certain movements    Alleviating factors:  Improved by: None    Therapies Tried and outcome: ice, pinching the nose- helps a little.   Issues with congestion and allergies long standing.       -------------------------------------    BP Readings from Last 3 Encounters:   06/02/20 98/52   05/29/20 98/58   05/21/20 (!) 152/66    Wt Readings from Last 3 Encounters:   06/02/20 59 kg (130 lb)   05/29/20 59 kg (130 lb)   05/21/20 59 kg (130 lb)                    -------------------------------------  Reviewed and updated as needed this visit by Provider  Allergies  Meds         Review of Systems   Constitutional, HEENT, cardiovascular, pulmonary, gi and gu systems are negative, except as otherwise noted.      Objective    BP 98/52 (BP Location: Left arm, Patient Position: Chair, Cuff Size: Adult Regular)   Pulse 66   Temp 97.9  F (36.6  C) (Temporal)   Resp 16   Wt 59 kg (130 lb)   SpO2 98%   BMI 49.43 kg/m    Body mass index is 49.43 kg/m .  Physical Exam   GENERAL: healthy, alert and no distress  HENT: normal cephalic/atraumatic, nasal mucosa edematous , rhinorrhea clear, oral mucous membranes moist and boggy turbinates. Allergy   NECK: no adenopathy, no asymmetry, masses, or scars and thyroid normal to palpation  RESP: lungs clear to auscultation - no rales, rhonchi or wheezes  CV: regular rates and rhythm and normal S1 S2, no S3 or S4    Diagnostic Test Results:  Labs reviewed in  Epic        Assessment & Plan     1. Epistaxis  Intermittent, Nothing to cauterize today   See ENT     2. Congestion of paranasal sinus           Patient Instructions     Recommend to use Flonase every other day or just a few times a week for allergies.   Suspect nose is dry.  Recommend to continue saline spray or netti pot as need to help moisturize the nose.  Ok to use vaseline in the nasal passages for moisturizing.          Your provider has referred you to: [facility]: [place] [facility] [phone]   [email or web address]          Call and see ENT again         Return in about 6 months (around 12/2/2020) for Routine Visit, regular primary provider.    Vida Sanchez PA-C  [facility]

## 2020-06-02 NOTE — TELEPHONE ENCOUNTER
Received call from Torie, Therapy Manager at [facility].     She is requesting PT Eval and Treat. Patient has bilateral amputations and she is getting new prosthesis tomorrow. States she needs more therapy. Pended order, routing to provider to advise.    Requesting order be faxed to [facility] at [phone].

## 2020-06-03 DIAGNOSIS — I25.10 CORONARY ARTERY DISEASE INVOLVING NATIVE CORONARY ARTERY OF NATIVE HEART WITHOUT ANGINA PECTORIS: ICD-10-CM

## 2020-06-03 RX ORDER — NITROGLYCERIN 0.4 MG/1
TABLET SUBLINGUAL
Qty: 25 TABLET | Refills: 3 | Status: SHIPPED | OUTPATIENT
Start: 2020-06-03 | End: 2021-04-12

## 2020-06-08 ENCOUNTER — MEDICAL CORRESPONDENCE (OUTPATIENT)
Dept: HEALTH INFORMATION MANAGEMENT | Facility: CLINIC | Age: 70
End: 2020-06-08

## 2020-06-10 ENCOUNTER — APPOINTMENT (OUTPATIENT)
Dept: GENERAL RADIOLOGY | Facility: CLINIC | Age: 70
DRG: 369 | End: 2020-06-10
Attending: EMERGENCY MEDICINE
Payer: COMMERCIAL

## 2020-06-10 ENCOUNTER — APPOINTMENT (OUTPATIENT)
Dept: CT IMAGING | Facility: CLINIC | Age: 70
DRG: 369 | End: 2020-06-10
Attending: EMERGENCY MEDICINE
Payer: COMMERCIAL

## 2020-06-10 ENCOUNTER — TELEPHONE (OUTPATIENT)
Dept: INTERNAL MEDICINE | Facility: CLINIC | Age: 70
End: 2020-06-10

## 2020-06-10 ENCOUNTER — HOSPITAL ENCOUNTER (INPATIENT)
Facility: CLINIC | Age: 70
LOS: 2 days | Discharge: NURSING, REHAB OR LONG-TERM CARE | DRG: 369 | End: 2020-06-12
Attending: EMERGENCY MEDICINE | Admitting: STUDENT IN AN ORGANIZED HEALTH CARE EDUCATION/TRAINING PROGRAM
Payer: COMMERCIAL

## 2020-06-10 DIAGNOSIS — T83.511D URINARY TRACT INFECTION ASSOCIATED WITH INDWELLING URETHRAL CATHETER, SUBSEQUENT ENCOUNTER: ICD-10-CM

## 2020-06-10 DIAGNOSIS — Z20.828 EXPOSURE TO SARS-ASSOCIATED CORONAVIRUS: ICD-10-CM

## 2020-06-10 DIAGNOSIS — R06.02 SOB (SHORTNESS OF BREATH): ICD-10-CM

## 2020-06-10 DIAGNOSIS — I50.22 CHRONIC SYSTOLIC HEART FAILURE (H): ICD-10-CM

## 2020-06-10 DIAGNOSIS — L89.132: Primary | ICD-10-CM

## 2020-06-10 DIAGNOSIS — N39.0 URINARY TRACT INFECTION ASSOCIATED WITH INDWELLING URETHRAL CATHETER, SUBSEQUENT ENCOUNTER: ICD-10-CM

## 2020-06-10 DIAGNOSIS — F17.210 CIGARETTE SMOKER: ICD-10-CM

## 2020-06-10 DIAGNOSIS — B37.81 CANDIDA ESOPHAGITIS (H): ICD-10-CM

## 2020-06-10 DIAGNOSIS — J41.0 SIMPLE CHRONIC BRONCHITIS (H): ICD-10-CM

## 2020-06-10 PROBLEM — R07.1 CHEST PAIN ON BREATHING: Status: ACTIVE | Noted: 2020-06-10

## 2020-06-10 LAB
ALBUMIN SERPL-MCNC: 2.9 G/DL (ref 3.4–5)
ALP SERPL-CCNC: 263 U/L (ref 40–150)
ALT SERPL W P-5'-P-CCNC: 62 U/L (ref 0–50)
ANION GAP SERPL CALCULATED.3IONS-SCNC: 4 MMOL/L (ref 3–14)
AST SERPL W P-5'-P-CCNC: 47 U/L (ref 0–45)
BASE EXCESS BLDV CALC-SCNC: 3.5 MMOL/L
BASOPHILS # BLD AUTO: 0 10E9/L (ref 0–0.2)
BASOPHILS NFR BLD AUTO: 0.4 %
BILIRUB SERPL-MCNC: 0.1 MG/DL (ref 0.2–1.3)
BUN SERPL-MCNC: 11 MG/DL (ref 7–30)
CALCIUM SERPL-MCNC: 8.8 MG/DL (ref 8.5–10.1)
CHLORIDE SERPL-SCNC: 102 MMOL/L (ref 94–109)
CO2 SERPL-SCNC: 29 MMOL/L (ref 20–32)
CREAT SERPL-MCNC: 0.54 MG/DL (ref 0.52–1.04)
CRP SERPL-MCNC: 46 MG/L (ref 0–8)
D DIMER PPP FEU-MCNC: 0.6 UG/ML FEU (ref 0–0.5)
DIFFERENTIAL METHOD BLD: NORMAL
EOSINOPHIL # BLD AUTO: 0.4 10E9/L (ref 0–0.7)
EOSINOPHIL NFR BLD AUTO: 3.7 %
ERYTHROCYTE [DISTWIDTH] IN BLOOD BY AUTOMATED COUNT: 13.4 % (ref 10–15)
GFR SERPL CREATININE-BSD FRML MDRD: >90 ML/MIN/{1.73_M2}
GLUCOSE BLDC GLUCOMTR-MCNC: 76 MG/DL (ref 70–99)
GLUCOSE SERPL-MCNC: 74 MG/DL (ref 70–99)
HCO3 BLDV-SCNC: 31 MMOL/L (ref 21–28)
HCT VFR BLD AUTO: 37.2 % (ref 35–47)
HGB BLD-MCNC: 12.2 G/DL (ref 11.7–15.7)
IMM GRANULOCYTES # BLD: 0.1 10E9/L (ref 0–0.4)
IMM GRANULOCYTES NFR BLD: 0.6 %
INTERPRETATION ECG - MUSE: NORMAL
LYMPHOCYTES # BLD AUTO: 1.7 10E9/L (ref 0.8–5.3)
LYMPHOCYTES NFR BLD AUTO: 16.4 %
MCH RBC QN AUTO: 28.7 PG (ref 26.5–33)
MCHC RBC AUTO-ENTMCNC: 32.8 G/DL (ref 31.5–36.5)
MCV RBC AUTO: 88 FL (ref 78–100)
MONOCYTES # BLD AUTO: 1.1 10E9/L (ref 0–1.3)
MONOCYTES NFR BLD AUTO: 10.1 %
NEUTROPHILS # BLD AUTO: 7.3 10E9/L (ref 1.6–8.3)
NEUTROPHILS NFR BLD AUTO: 68.8 %
NRBC # BLD AUTO: 0 10*3/UL
NRBC BLD AUTO-RTO: 0 /100
NT-PROBNP SERPL-MCNC: 31 PG/ML (ref 0–900)
O2/TOTAL GAS SETTING VFR VENT: 21 %
PCO2 BLDV: 58 MM HG (ref 40–50)
PH BLDV: 7.34 PH (ref 7.32–7.43)
PLATELET # BLD AUTO: 299 10E9/L (ref 150–450)
PO2 BLDV: 32 MM HG (ref 25–47)
POTASSIUM SERPL-SCNC: 3.6 MMOL/L (ref 3.4–5.3)
PROT SERPL-MCNC: 8.2 G/DL (ref 6.8–8.8)
RBC # BLD AUTO: 4.25 10E12/L (ref 3.8–5.2)
SARS-COV-2 PCR COMMENT: NORMAL
SARS-COV-2 RNA SPEC QL NAA+PROBE: NEGATIVE
SARS-COV-2 RNA SPEC QL NAA+PROBE: NORMAL
SODIUM SERPL-SCNC: 134 MMOL/L (ref 133–144)
SPECIMEN SOURCE: NORMAL
SPECIMEN SOURCE: NORMAL
TROPONIN I SERPL-MCNC: <0.015 UG/L (ref 0–0.04)
WBC # BLD AUTO: 10.5 10E9/L (ref 4–11)

## 2020-06-10 PROCEDURE — 80053 COMPREHEN METABOLIC PANEL: CPT | Performed by: EMERGENCY MEDICINE

## 2020-06-10 PROCEDURE — U0003 INFECTIOUS AGENT DETECTION BY NUCLEIC ACID (DNA OR RNA); SEVERE ACUTE RESPIRATORY SYNDROME CORONAVIRUS 2 (SARS-COV-2) (CORONAVIRUS DISEASE [COVID-19]), AMPLIFIED PROBE TECHNIQUE, MAKING USE OF HIGH THROUGHPUT TECHNOLOGIES AS DESCRIBED BY CMS-2020-01-R: HCPCS | Performed by: EMERGENCY MEDICINE

## 2020-06-10 PROCEDURE — 82803 BLOOD GASES ANY COMBINATION: CPT | Performed by: EMERGENCY MEDICINE

## 2020-06-10 PROCEDURE — 99207 ZZC CDG-MDM COMPONENT: MEETS LOW - DOWN CODED: CPT | Performed by: STUDENT IN AN ORGANIZED HEALTH CARE EDUCATION/TRAINING PROGRAM

## 2020-06-10 PROCEDURE — 25000131 ZZH RX MED GY IP 250 OP 636 PS 637: Performed by: EMERGENCY MEDICINE

## 2020-06-10 PROCEDURE — 85379 FIBRIN DEGRADATION QUANT: CPT | Performed by: EMERGENCY MEDICINE

## 2020-06-10 PROCEDURE — 99285 EMERGENCY DEPT VISIT HI MDM: CPT | Mod: 25 | Performed by: EMERGENCY MEDICINE

## 2020-06-10 PROCEDURE — 71045 X-RAY EXAM CHEST 1 VIEW: CPT

## 2020-06-10 PROCEDURE — 00000146 ZZHCL STATISTIC GLUCOSE BY METER IP

## 2020-06-10 PROCEDURE — 99222 1ST HOSP IP/OBS MODERATE 55: CPT | Mod: AI | Performed by: STUDENT IN AN ORGANIZED HEALTH CARE EDUCATION/TRAINING PROGRAM

## 2020-06-10 PROCEDURE — 71275 CT ANGIOGRAPHY CHEST: CPT

## 2020-06-10 PROCEDURE — 99284 EMERGENCY DEPT VISIT MOD MDM: CPT | Mod: 25 | Performed by: EMERGENCY MEDICINE

## 2020-06-10 PROCEDURE — 83880 ASSAY OF NATRIURETIC PEPTIDE: CPT | Performed by: EMERGENCY MEDICINE

## 2020-06-10 PROCEDURE — C9803 HOPD COVID-19 SPEC COLLECT: HCPCS | Performed by: EMERGENCY MEDICINE

## 2020-06-10 PROCEDURE — 84484 ASSAY OF TROPONIN QUANT: CPT | Performed by: EMERGENCY MEDICINE

## 2020-06-10 PROCEDURE — 12000004 ZZH R&B IMCU UMMC

## 2020-06-10 PROCEDURE — 86140 C-REACTIVE PROTEIN: CPT | Performed by: EMERGENCY MEDICINE

## 2020-06-10 PROCEDURE — 85025 COMPLETE CBC W/AUTO DIFF WBC: CPT | Performed by: EMERGENCY MEDICINE

## 2020-06-10 PROCEDURE — 93010 ELECTROCARDIOGRAM REPORT: CPT | Mod: Z6 | Performed by: EMERGENCY MEDICINE

## 2020-06-10 PROCEDURE — 93005 ELECTROCARDIOGRAM TRACING: CPT | Performed by: EMERGENCY MEDICINE

## 2020-06-10 PROCEDURE — 25000128 H RX IP 250 OP 636: Performed by: EMERGENCY MEDICINE

## 2020-06-10 RX ORDER — PREDNISONE 20 MG/1
40 TABLET ORAL ONCE
Status: COMPLETED | OUTPATIENT
Start: 2020-06-10 | End: 2020-06-10

## 2020-06-10 RX ORDER — AMOXICILLIN 250 MG
2 CAPSULE ORAL 2 TIMES DAILY
Status: DISCONTINUED | OUTPATIENT
Start: 2020-06-11 | End: 2020-06-12 | Stop reason: HOSPADM

## 2020-06-10 RX ORDER — POLYETHYLENE GLYCOL 3350 17 G/17G
17 POWDER, FOR SOLUTION ORAL DAILY PRN
Status: DISCONTINUED | OUTPATIENT
Start: 2020-06-10 | End: 2020-06-12 | Stop reason: HOSPADM

## 2020-06-10 RX ORDER — ACETAMINOPHEN 650 MG/1
650 SUPPOSITORY RECTAL EVERY 4 HOURS PRN
Status: DISCONTINUED | OUTPATIENT
Start: 2020-06-10 | End: 2020-06-12 | Stop reason: HOSPADM

## 2020-06-10 RX ORDER — BISACODYL 10 MG
10 SUPPOSITORY, RECTAL RECTAL DAILY PRN
Status: DISCONTINUED | OUTPATIENT
Start: 2020-06-10 | End: 2020-06-12 | Stop reason: HOSPADM

## 2020-06-10 RX ORDER — ACETAMINOPHEN 325 MG/1
650 TABLET ORAL EVERY 4 HOURS PRN
Status: DISCONTINUED | OUTPATIENT
Start: 2020-06-10 | End: 2020-06-12 | Stop reason: HOSPADM

## 2020-06-10 RX ORDER — LIDOCAINE 40 MG/G
CREAM TOPICAL
Status: DISCONTINUED | OUTPATIENT
Start: 2020-06-10 | End: 2020-06-12 | Stop reason: HOSPADM

## 2020-06-10 RX ORDER — IOPAMIDOL 755 MG/ML
52 INJECTION, SOLUTION INTRAVASCULAR ONCE
Status: COMPLETED | OUTPATIENT
Start: 2020-06-10 | End: 2020-06-10

## 2020-06-10 RX ORDER — AMOXICILLIN 250 MG
1 CAPSULE ORAL 2 TIMES DAILY
Status: DISCONTINUED | OUTPATIENT
Start: 2020-06-11 | End: 2020-06-12 | Stop reason: HOSPADM

## 2020-06-10 RX ADMIN — IOPAMIDOL 52 ML: 755 INJECTION, SOLUTION INTRAVENOUS at 16:15

## 2020-06-10 RX ADMIN — PREDNISONE 40 MG: 20 TABLET ORAL at 18:05

## 2020-06-10 ASSESSMENT — MIFFLIN-ST. JEOR: SCORE: 756.31

## 2020-06-10 NOTE — TELEPHONE ENCOUNTER
Patient ended up gong to the hospital.     Patient complained of SOB, diffuclty breathing and some 'confusion'. Patient was ok with going to the ER. Ellie stated patient will be going to a  hospital.    Valeria GIFFORD, RN, PHN

## 2020-06-10 NOTE — ED PROVIDER NOTES
"    [facility] ([facility])  Quin 10, 2020  History     Chief Complaint   Patient presents with     Chest Pain     Shortness of Breath     HPI  Sonya Foote is a 70 year old transgender patient (male to female) with a medical history of critical lower limb ischemia, TIA, chronic systolic hear failure, GI bleed, GERD, COPD, HTN, CAD, leg thrombosis, CVAs with resulting dysphagia, carotid artery stent, s/p bilateral above knee amputations, HLD, DM2, and a neurogenic bladder s/p cystoscopy w/ botulinum injection (5/21) who presents to the ED with chest pain and shortness of breath since yesterday. Has had increased usage of inhaler. Patient arrives from a residential facility with known COVID-19 positive patients.    She reports escalating chest pain and shortness of breath.  She reports is been going on for months but worse recently.  She endorses a \"smoker's cough\" that seems relatively unchanged, no fever.  No obvious orthopnea, shortness of breath is worse with movement.  Patient also endorses she feels extremely fatigued.  No new GI symptoms.  No new lower extremity symptoms.  Chest pain seems to be exertional, but does not notice it every day.  Pain does not radiate.    PAST MEDICAL HISTORY:   Past Medical History:   Diagnosis Date     Acid reflux disease      Amputation above knee (H)     bilateral     Benign essential hypertension      Blind left eye     due to central retinal artery occlusion     CAD (coronary artery disease)     UA and inferior MI in 2016 s/p PCI     Chronic back pain      Chronic neck pain      Chronic pain syndrome     with fentanyl intrathecal pain pump     Complex sleep apnea syndrome      COPD (chronic obstructive pulmonary disease) (H)      Dysphagia     chronic due to esophageal strictures and multiple previous dilitations      ROGER (generalized anxiety disorder)      History of blood transfusion     x5; no adverse reactions     History of central retinal artery " occlusion 2006    left-sided     History of stroke     residual left-sided weakness     Hx of carotid artery stenosis     s/p left carotid stent in 2006 and balloon angioplasty in 2016     MDD (major depressive disorder)      Neurogenic bladder     SPT in place     JOSE (obstructive sleep apnea)      Osteoporosis      PAD (peripheral artery disease) (H)      Peripheral neuropathy      Person who has had sex change operation     male to female     Seizure disorder (H)     many years since last grand mal; daily, brief petit mals     Sickle cell trait (H)      Type 2 diabetes mellitus (H)        PAST SURGICAL HISTORY:   Past Surgical History:   Procedure Laterality Date     AMPUTATE LEG ABOVE KNEE Left 6/11/2016    Procedure: AMPUTATE LEG ABOVE KNEE;  Surgeon: Mello Rodriguez MD;  [facility]     AMPUTATE LEG BELOW KNEE Right 11/7/2016    Procedure: AMPUTATE LEG BELOW KNEE;  Surgeon: Savannah Durant MD;  [facility]     AMPUTATE REVISION STUMP LOWER EXTREMITY Right 11/11/2016    Procedure: AMPUTATE REVISION STUMP LOWER EXTREMITY;  Surgeon: Savannah Durant MD;  [facility]     AMPUTATE REVISION STUMP LOWER EXTREMITY Right 11/16/2016    Procedure: AMPUTATE REVISION STUMP LOWER EXTREMITY;  Surgeon: Savannah Durant MD;  [facility]     AMPUTATE TOE(S) Right 1/5/2016    Procedure: AMPUTATE TOE(S);  Surgeon: Mello Gaines DPM;  Location:  [facility]     ANGIOGRAM Bilateral 11/21/2014    Procedure: ANGIOGRAM;  Surgeon: Savannah Durant MD;  [facility]     ANGIOGRAM Left 1/16/2015    Procedure: ANGIOGRAM;  Surgeon: Savannah Durant MD;  [facility]     ANGIOGRAM Bilateral 9/14/2015    Procedure: ANGIOGRAM;  Surgeon: Savannah Durant MD;  [facility]     ANGIOGRAM Left 10/12/2015    Procedure: ANGIOGRAM;  Surgeon: Savannah Durant MD;  [facility]     ANGIOGRAM Right 6/6/2016    Procedure: ANGIOGRAM;  Surgeon: Savannah Durant MD;  [facility]     ANGIOPLASTY Right 6/6/2016    Procedure: ANGIOPLASTY;   Surgeon: Savannah Durant MD;  [facility]     APPENDECTOMY       BREAST BIOPSY, RT/LT Right     benign     CATARACT IOL, RT/LT Right      CHOLECYSTECTOMY       COLONOSCOPY N/A 8/25/2014    Procedure: COLONOSCOPY;  Surgeon: Mello Ferrer MD;  [facility]     COLONOSCOPY WITH CO2 INSUFFLATION N/A 8/20/2014    Procedure: COLONOSCOPY WITH CO2 INSUFFLATION;  Surgeon: Duane, William Charles, MD;  [facility]     CYSTOSCOPY, INJECT BOTOX N/A 5/21/2020    Procedure: CYSTOSCOPY, WITH BOTULINUM TOXIN INJECTION;  Surgeon: Loki Gordon MD;  [facility]     CYSTOSCOPY, INTRAVESICAL INJECTION N/A 10/31/2019    Procedure: CYSTOSCOPY, BOTOX INJECTION, Suprapubic Catheter Exchange;  Surgeon: Loki Gordon MD;  [facility]     CYSTOSTOMY, INSERT TUBE SUPRAPUBIC, COMBINED N/A 1/16/2018    Procedure: COMBINED CYSTOSTOMY, INSERT TUBE SUPRAPUBIC;  Cystoscopy, Intraoperative Ultrasound, Suprapubic Tube Placement;  Surgeon: Keanu Dawson MD;  [facility]     ENDARTERECTOMY FEMORAL  5/23/2014    Procedure: ENDARTERECTOMY FEMORAL;  Surgeon: Jason Joshi MD;  [facility]     ESOPHAGOSCOPY, GASTROSCOPY, DUODENOSCOPY (EGD), COMBINED  12/14/2012    Procedure: COMBINED ESOPHAGOSCOPY, GASTROSCOPY, DUODENOSCOPY (EGD), BIOPSY SINGLE OR MULTIPLE;  ESOPHAGOSCOPY, GASTROSCOPY, DUODENOSCOPY (EGD), DILATATION ;  Surgeon: Elizabeth Stevenson MD;  Location:  GI     ESOPHAGOSCOPY, GASTROSCOPY, DUODENOSCOPY (EGD), COMBINED  12/31/2013    Procedure: COMBINED ESOPHAGOSCOPY, GASTROSCOPY, DUODENOSCOPY (EGD), BIOPSY SINGLE OR MULTIPLE;;  Surgeon: Clemente Lopez MD;  [facility]     ESOPHAGOSCOPY, GASTROSCOPY, DUODENOSCOPY (EGD), COMBINED  4/1/2014    Procedure: COMBINED ESOPHAGOSCOPY, GASTROSCOPY, DUODENOSCOPY (EGD);;  Surgeon: Clemente Lopez MD;  [facility]     ESOPHAGOSCOPY, GASTROSCOPY, DUODENOSCOPY (EGD), COMBINED  6/28/2014    Procedure: COMBINED ESOPHAGOSCOPY, GASTROSCOPY,  DUODENOSCOPY (EGD);  Surgeon: Clemente Lopez MD;  [facility]     ESOPHAGOSCOPY, GASTROSCOPY, DUODENOSCOPY (EGD), COMBINED N/A 8/20/2014    Procedure: COMBINED ESOPHAGOSCOPY, GASTROSCOPY, DUODENOSCOPY (EGD), BIOPSY SINGLE OR MULTIPLE;  Surgeon: Duane, William Charles, MD;  [facility]     ESOPHAGOSCOPY, GASTROSCOPY, DUODENOSCOPY (EGD), COMBINED N/A 8/22/2014    Procedure: COMBINED ESOPHAGOSCOPY, GASTROSCOPY, DUODENOSCOPY (EGD), BIOPSY SINGLE OR MULTIPLE;  Surgeon: Mello Ferrer MD;  [facility]     ESOPHAGOSCOPY, GASTROSCOPY, DUODENOSCOPY (EGD), COMBINED N/A 10/2/2014    Procedure: COMBINED ESOPHAGOSCOPY, GASTROSCOPY, DUODENOSCOPY (EGD), BIOPSY SINGLE OR MULTIPLE;  Surgeon: Remy Haskins MD;  [facility]     ESOPHAGOSCOPY, GASTROSCOPY, DUODENOSCOPY (EGD), COMBINED Left 12/15/2014    Procedure: COMBINED ESOPHAGOSCOPY, GASTROSCOPY, DUODENOSCOPY (EGD), BIOPSY SINGLE OR MULTIPLE;  Surgeon: Remy Haskins MD;  [facility]     ESOPHAGOSCOPY, GASTROSCOPY, DUODENOSCOPY (EGD), COMBINED N/A 2/25/2015    Procedure: COMBINED ENDOSCOPIC ULTRASOUND, ESOPHAGOSCOPY, GASTROSCOPY, DUODENOSCOPY (EGD), FINE NEEDLE ASPIRATE/BIOPSY;  Surgeon: Clemente Lugo MD;  [facility]     ESOPHAGOSCOPY, GASTROSCOPY, DUODENOSCOPY (EGD), COMBINED Left 2/25/2015    Procedure: COMBINED ESOPHAGOSCOPY, GASTROSCOPY, DUODENOSCOPY (EGD), BIOPSY SINGLE OR MULTIPLE;  Surgeon: Clemente Lugo MD;  [facility]     ESOPHAGOSCOPY, GASTROSCOPY, DUODENOSCOPY (EGD), COMBINED N/A 9/25/2016    Procedure: COMBINED ESOPHAGOSCOPY, GASTROSCOPY, DUODENOSCOPY (EGD);  Surgeon: Aziza Patiño MD;  [facility]     ESOPHAGOSCOPY, GASTROSCOPY, DUODENOSCOPY (EGD), COMBINED N/A 1/18/2017    Procedure: COMBINED ESOPHAGOSCOPY, GASTROSCOPY, DUODENOSCOPY (EGD), BIOPSY SINGLE OR MULTIPLE;  Surgeon: Clemente Lopez MD;  Location:  [facility]     ESOPHAGOSCOPY, GASTROSCOPY, DUODENOSCOPY (EGD), COMBINED N/A 11/26/2017    Procedure:  COMBINED ESOPHAGOSCOPY, GASTROSCOPY, DUODENOSCOPY (EGD), REMOVE FOREIGN BODY;  Esophagogastroduodenoscopy with foreign body extraction  ;  Surgeon: Herberth Castrejon MD;  [facility]     ESOPHAGOSCOPY, GASTROSCOPY, DUODENOSCOPY (EGD), COMBINED N/A 11/26/2017    Procedure: COMBINED ESOPHAGOSCOPY, GASTROSCOPY, DUODENOSCOPY (EGD), REMOVE FOREIGN BODY;;  Surgeon: Herberth Castrejon MD;  [facility]     ESOPHAGOSCOPY, GASTROSCOPY, DUODENOSCOPY (EGD), COMBINED N/A 4/10/2020    Procedure: ESOPHAGOGASTRODUODENOSCOPY (EGD);  Surgeon: Yael Cabral MD;  [facility]     FASCIOTOMY LOWER EXTREMITY Left 6/10/2016    Procedure: FASCIOTOMY LOWER EXTREMITY;  Surgeon: Mello Rodriguez MD;  [facility]     HC CAPSULE ENDOSCOPY N/A 8/25/2014    Procedure: CAPSULE/PILL CAM ENDOSCOPY;  Surgeon: Remy Haskins MD;  [facility]     HC CAPSULE ENDOSCOPY N/A 10/2/2014    Procedure: CAPSULE/PILL CAM ENDOSCOPY;  Surgeon: Remy Haskins MD;  [facility]     ORTHOPEDIC SURGERY      broken wrist repair     SEX TRANSFORMATION SURGERY, MALE TO FEMALE  1974 1974     SINUS SURGERY      cyst removed     TONSILLECTOMY       VASCULAR SURGERY  2006    Left carotid stent       Past medical history, past surgical history, medications, and allergies were reviewed with the patient. Additional pertinent items: None    FAMILY HISTORY:   Family History   Problem Relation Age of Onset     Dementia Mother      Diabetes Mother         type unknown     Coronary Artery Disease Mother         MI     Glaucoma Father      Diabetes Father         type unknown     Heart Failure Father      Schizophrenia Brother      Depression Brother      Suicide Sister      Depression Sister      Diabetes Sister      Ovarian Cancer Maternal Aunt      Leukemia Maternal Aunt      Diabetes Maternal Grandmother      Diabetes Maternal Grandfather      Diabetes Paternal Grandmother      Diabetes Paternal Grandfather      Breast Cancer  Sister      Cerebrovascular Disease Brother      Colon Cancer No family hx of      Macular Degeneration No family hx of        SOCIAL HISTORY:   Social History     Tobacco Use     Smoking status: Current Every Day Smoker     Packs/day: 1.50     Years: 30.00     Pack years: 45.00     Types: Cigarettes, Cigars     Smokeless tobacco: Never Used     Tobacco comment: 1.5 packs per day    Substance Use Topics     Alcohol use: No     Alcohol/week: 0.0 standard drinks     Social history was reviewed with the patient. Additional pertinent items: None      Patient's Medications   New Prescriptions    No medications on file   Previous Medications    ACETAMINOPHEN PO    Take 1,000 mg by mouth every 6 hours as needed for pain Not to exceed 4000 mg/day    ADVAIR DISKUS 250-50 MCG/DOSE DISKUS INHALER    Inhale 1 puff into the lungs 2 times daily    ALBUTEROL (PROVENTIL) (2.5 MG/3ML) 0.083% NEB SOLUTION    INHALE 1 VIAL VIA NEBULIZER EVERY 6 HOURS AS NEEDED    ALENDRONATE (FOSAMAX) 70 MG TABLET    Take 1 tablet (70 mg) by mouth with 8oz water every 7 days 30 minutes before breakfast and remain upright during this time.    ASPIRIN EC 81 MG EC TABLET    Take 1 tablet (81 mg) by mouth daily    ATORVASTATIN (LIPITOR) 40 MG TABLET    TAKE 1 TAB BY MOUTH ONCE DAILY    BLOOD GLUCOSE MONITORING (ONE TOUCH ULTRA 2) METER DEVICE KIT    Use to test blood sugars 3 times daily or as directed.    BLOOD GLUCOSE MONITORING (ONE TOUCH ULTRA) TEST STRIP    Use to test blood sugars 3 times daily or as directed.    BLOOD GLUCOSE MONITORING (ONE TOUCH ULTRASOFT) LANCETS    Use to test blood sugar 3 times daily or as directed.    BRIMONIDINE (ALPHAGAN) 0.2 % OPHTHALMIC SOLUTION    Place 1 drop into the right eye 2 times daily    CAPSAICIN-MENTHOL-METHYL SAL 0.025-1-12 % EXTERNAL CREAM    Apply 1 Application topically daily as needed (topical pain)    CHOLECALCIFEROL (VITAMIN D) 2000 UNITS TABLET    Take 2,000 Units by mouth daily.    CLOTRIMAZOLE  (LOTRIMIN) 1 % CREAM    INSERT 1 APPLICATORFUL VAGINALLY TWICE A DAY FOR VAGINAL PAIN    CLOTRIMAZOLE (LOTRIMIN) 1 % EXTERNAL CREAM    Apply topically 2 times daily    DICLOFENAC (VOLTAREN) 1 % GEL TOPICAL GEL    Apply 2 g topically 4 times daily to hands    DORZOLAMIDE (TRUSOPT) 2 % OPHTHALMIC SOLUTION    Place 1 drop into the right eye 2 times daily    ESCITALOPRAM (LEXAPRO) 10 MG TABLET    TAKE 1 TAB BY MOUTH ONCE DAILY    FERROUS SULFATE (IRON) 325 (65 FE) MG TABLET    Take 1 tablet (325 mg) by mouth 2 times daily With meals    FLUTICASONE (FLONASE) 50 MCG/ACT NASAL SPRAY    Spray 1 spray into both nostrils daily    LACTULOSE (CHRONULAC) 10 GM/15ML SOLUTION    Take 30 mLs (20 g) by mouth as needed for constipation    LATANOPROST (XALATAN) 0.005 % OPHTHALMIC SOLUTION    Place 1 drop into both eyes At Bedtime    LEVETIRACETAM (KEPPRA) 500 MG TABLET    TAKE 1 TAB BY MOUTH TWICE A DAY    LIDOCAINE (LIDOCARE) 4 % PATCH    Place 1 patch onto the skin every 24 hours To prevent lidocaine toxicity, patient should be patch free for 12 hrs daily.    LISINOPRIL (PRINIVIL/ZESTRIL) 20 MG TABLET    TAKE 1 TAB BY MOUTH ONCE DAILY    LORATADINE (CLARITIN) 10 MG TABLET    Take 1 tablet (10 mg) by mouth daily    METFORMIN (GLUCOPHAGE) 500 MG TABLET    TAKE 1 TAB BY MOUTH IN THE EVENING WITH DINNER    METOPROLOL SUCCINATE ER (TOPROL-XL) 50 MG 24 HR TABLET    TAKE 1 TAB BY MOUTH ONCE DAILY    NICOTINE (NICODERM CQ) 14 MG/24HR 24 HR PATCH    Place 1 patch onto the skin every 24 hours    NITROGLYCERIN (NITROSTAT) 0.4 MG SUBLINGUAL TABLET    DISSOLVE ONE TABLET UNDER TONGUE AS NEEDED FOR CHEST PAIN MAY REPEAT EVERY 5 MINUTES FOR 3 DOSES, IF SYMPTOMS PERSIST CALL 911    NUTRITIONAL SUPPLEMENTS (ENSURE NUTRITION SHAKE) LIQD    Take 1 Bottle by mouth 2 times daily With meals    NYSTATIN (MYCOSTATIN) 960559 UNIT/ML SUSPENSION    Take 5 mLs (500,000 Units) by mouth 4 times daily - swish and spit for 7 days    OXYCODONE (ROXICODONE) 5 MG  "TABLET    TAKE 1 TABLET BY MOUTH EVERY 6 HOURS AS NEEDED    PANTOPRAZOLE (PROTONIX) 40 MG EC TABLET    TAKE 1 TAB BY MOUTH ONCE DAILY    PHENYTOIN (DILANTIN) 100 MG CAPSULE    TAKE 2 CAPS (200MG) BY MOUTH TWICE A DAY    POLYETHYLENE GLYCOL (MIRALAX/GLYCOLAX) PACKET    Take 17 g by mouth daily Dissolved in water or juice     PREGABALIN (LYRICA) 150 MG CAPSULE        PREGABALIN (LYRICA) 75 MG CAPSULE    Take 150 mg by mouth 3 times daily    RISPERIDONE (RISPERDAL) 0.5 MG TABLET    TAKE 1 TAB BY MOUTH AT BEDTIME    SENNOSIDES (SENOKOT) 8.6 MG TABLET    Take 1 tablet by mouth 2 times daily     SOLIFENACIN (VESICARE) 10 MG TABLET    TAKE 1 TAB BY MOUTH ONCE DAILY    SUCRALFATE (CARAFATE) 1 GM TABLET    TAKE 1 TAB BY MOUTH FOUR TIMES DAILY. MAY DISSOLVE IN 10ML WATER ISNECESSARY    TRAZODONE (DESYREL) 150 MG TABLET    TAKE 1 TAB BY MOUTH AT BEDTIME    UMECLIDINIUM (INCRUSE ELLIPTA) 62.5 MCG/INH INHALER    Inhale 1 puff into the lungs daily    VENTOLIN  (90 BASE) MCG/ACT INHALER    Inhale 2 puffs into the lungs every 6 hours as needed for shortness of breath / dyspnea or wheezing   Modified Medications    No medications on file   Discontinued Medications    No medications on file          Allergies   Allergen Reactions     Bee Venom Anaphylaxis     Penicillins Anaphylaxis     Dilantin [Phenytoin] Other (See Comments)     Generic dilantin only per pt     Iodine Hives     Novocaine [Procaine] Hives     Tositumomab Unknown        Review of Systems  A complete review of systems was performed with pertinent positives and negatives noted in the HPI, and all other systems negative.    Physical Exam   BP: 125/66  Pulse: 69  Heart Rate: 64  Temp: 99.4  F (37.4  C)  Resp: 18  Height: 109.2 cm (3' 7\")  Weight: 59 kg (130 lb)  SpO2: 100 %      Physical Exam  General: awake, alert, NAD  Head: normal cephalic  HEENT: pupils equal, conjugate gaze in tact  Neck: Supple  CV: regular rate and rhythm without murmur  Lungs: clear to " ascultation  Abd: soft, non-tender, no guarding, no peritoneal signs  EXT: lower extremities without swelling or edema  Neuro: awake, answers questions appropriately. No focal deficits noted       ED Course        Procedures             EKG Interpretation:      Interpreted by Joe Minor MD  Time reviewed: 12:54  Symptoms at time of EKG: SOB   Rhythm: normal sinus   Rate: normal  Axis: normal  Ectopy: none  Conduction: normal  ST Segments/ T Waves: No ST-T wave changes  Q Waves: Q waves in lead III and aVF, unchanged from previous  Comparison to prior: Unchanged    Clinical Impression: unchanged EKG, no sign of ischemia         Results for orders placed or performed during the hospital encounter of 06/10/20 (from the past 24 hour(s))   EKG 12 lead   Result Value Ref Range    Interpretation ECG Click View Image link to view waveform and result    XR Chest Port 1 View    Narrative    EXAM: XR CHEST PORT 1 VW  6/10/2020 1:50 PM     HISTORY:  SOB       COMPARISON:  8/27/2019    FINDINGS:   AP view the chest. Midline trachea. Cardiac silhouette is partially  obscured on the left, however appears slightly enlarged. Aortic  atherosclerosis. Diffuse mixed interstitial and patchy pulmonary  opacities, more pronounced at the lung bases, left greater than right.  No pneumothorax. Likely small left effusion. No right-sided effusion.  Upper abdomen is unremarkable. No acute osseous abnormality.         Impression    IMPRESSION:   .1. Diffuse mixed interstitial and patchy pulmonary opacities, more  pronounced at the lung bases, left greater than right. Differential  includes edema and/or infection.  2. Small left pleural effusion.   3. Aortic atherosclerosis.    I have personally reviewed the examination and initial interpretation  and I agree with the findings.    VERA DENIS MD   CBC with platelets differential   Result Value Ref Range    WBC 10.5 4.0 - 11.0 10e9/L    RBC Count 4.25 3.8 - 5.2 10e12/L    Hemoglobin  12.2 11.7 - 15.7 g/dL    Hematocrit 37.2 35.0 - 47.0 %    MCV 88 78 - 100 fl    MCH 28.7 26.5 - 33.0 pg    MCHC 32.8 31.5 - 36.5 g/dL    RDW 13.4 10.0 - 15.0 %    Platelet Count 299 150 - 450 10e9/L    Diff Method Automated Method     % Neutrophils 68.8 %    % Lymphocytes 16.4 %    % Monocytes 10.1 %    % Eosinophils 3.7 %    % Basophils 0.4 %    % Immature Granulocytes 0.6 %    Nucleated RBCs 0 0 /100    Absolute Neutrophil 7.3 1.6 - 8.3 10e9/L    Absolute Lymphocytes 1.7 0.8 - 5.3 10e9/L    Absolute Monocytes 1.1 0.0 - 1.3 10e9/L    Absolute Eosinophils 0.4 0.0 - 0.7 10e9/L    Absolute Basophils 0.0 0.0 - 0.2 10e9/L    Abs Immature Granulocytes 0.1 0 - 0.4 10e9/L    Absolute Nucleated RBC 0.0    Comprehensive metabolic panel   Result Value Ref Range    Sodium 134 133 - 144 mmol/L    Potassium 3.6 3.4 - 5.3 mmol/L    Chloride 102 94 - 109 mmol/L    Carbon Dioxide 29 20 - 32 mmol/L    Anion Gap 4 3 - 14 mmol/L    Glucose 74 70 - 99 mg/dL    Urea Nitrogen 11 7 - 30 mg/dL    Creatinine 0.54 0.52 - 1.04 mg/dL    GFR Estimate >90 >60 mL/min/[1.73_m2]    GFR Estimate If Black >90 >60 mL/min/[1.73_m2]    Calcium 8.8 8.5 - 10.1 mg/dL    Bilirubin Total 0.1 (L) 0.2 - 1.3 mg/dL    Albumin 2.9 (L) 3.4 - 5.0 g/dL    Protein Total 8.2 6.8 - 8.8 g/dL    Alkaline Phosphatase 263 (H) 40 - 150 U/L    ALT 62 (H) 0 - 50 U/L    AST 47 (H) 0 - 45 U/L   Troponin I   Result Value Ref Range    Troponin I ES <0.015 0.000 - 0.045 ug/L   D dimer quantitative   Result Value Ref Range    D Dimer 0.6 (H) 0.0 - 0.50 ug/ml FEU   Blood gas venous   Result Value Ref Range    Ph Venous 7.34 7.32 - 7.43 pH    PCO2 Venous 58 (H) 40 - 50 mm Hg    PO2 Venous 32 25 - 47 mm Hg    Bicarbonate Venous 31 (H) 21 - 28 mmol/L    Base Excess Venous 3.5 mmol/L    FIO2 21    Nt probnp inpatient (BNP)   Result Value Ref Range    N-Terminal Pro BNP Inpatient 31 0 - 900 pg/mL   Symptomatic COVID-19 Virus (Coronavirus) by PCR    Specimen: Nasopharyngeal   Result Value  Ref Range    COVID-19 Virus PCR to [facility] - Source Nasopharyngeal     COVID-19 Virus PCR to [facility] - Result       Test received-See reflex to IDDL test SARS CoV2 (COVID-19) Virus RT-PCR   SARS-CoV-2 COVID-19 Virus (Coronavirus) RT-PCR Nasopharyngeal    Specimen: Nasopharyngeal   Result Value Ref Range    SARS-CoV-2 Virus Specimen Source Nasopharyngeal     SARS-CoV-2 PCR Result NEGATIVE     SARS-CoV-2 PCR Comment       This automated, real-time RT-PCR assay by [employer] on the [employer] has   been given Emergency Use Authorization (EUA) for the in vitro qualitative detection of RNA   from the SARS-CoV2 virus in nasopharyngeal swabs in viral transport medium from patients   with signs and symptoms of infection who are suspected of COVID-19. The performance is   unknown in asymptomatic patients.     CT Chest Pulmonary Embolism w Contrast    Narrative    Chest CT pulmonary angiogram with contrast    INDICATION: Shortness of breath    Contrast: 52 mL intravenous Isovue-370    COMPARISON: 5/2/2018    FINDINGS: Diffuse esophageal wall thickening, especially proximally  and distally. Small hiatal hernia. If there is history of dysphagia  comment please correlate with esophagram and/or upper GI endoscopy as  clinically appropriate. Left atrial cardiac chamber is enlarged. Upper  abdomen was grossly unremarkable. Thyroid appears grossly  unremarkable. Subcentimeter right hilar lymph node appears unchanged.  No enlarged axillary or mediastinal lymph nodes. Atherosclerotic  calcification in aorta and coronary arteries. No dominant pulmonary  nodule or groundglass opacity. Areas of scarring and subsegmental  atelectasis are present in both lung bases, slightly increased from  previous. Bones show degenerative disc changes throughout the thoracic  spine. Excessive kyphosis in the thorax.  The pulmonary artery tree was well-opacified with contrast comment no  evidence of film defect indicate embolus.       Impression    IMPRESSION: No CT evidence for pulmonary embolus. Esophageal  thickening comment please consider nonemergent esophagram or upper GI  endoscopy as clinically appropriate. Small hiatal hernia. Aortic and  coronary artery atherosclerosis. Bibasilar atelectasis and scarring  bilaterally. Excessive thoracic kyphosis.    VERA DENIS MD   Glucose by meter   Result Value Ref Range    Glucose 76 70 - 99 mg/dL     *Note: Due to a large number of results and/or encounters for the requested time period, some results have not been displayed. A complete set of results can be found in Results Review.     Medications   iopamidol (ISOVUE-370) solution 52 mL (52 mLs Intravenous Given 6/10/20 1615)   sodium chloride (PF) 0.9% PF flush 84 mL (84 mLs Intravenous Given 6/10/20 1615)   predniSONE (DELTASONE) tablet 40 mg (40 mg Oral Given 6/10/20 1805)             Assessments & Plan (with Medical Decision Making)   Sonya Foote is a 70-year-old female who presents with worsening shortness of breath and chest pain.  She arrives on 5 L O2, normally is not on any oxygen.  Was able to wean down to 2 L O2.  DDX includes: cardiac (heart failure, unstable angina, MI), infectious pathology (Coivid, pneumonia), PE, COPD exacerbation.     EKG obtained showed no signs of acute ischemia, unchanged from previous.  Basic labs were obtained most notable for a normal BNP, negative troponin, elevated d-dimer.  Upright chest x-ray showed diffuse mixed interstitial and pulmonary patchy opacities with a small pleural effusion.  Differential would still include infectious versus pulmonary edema.  Given the elevated d-dimer will obtain CT PE study.    No evidence of PE on CT scan.  Patient has esophageal thickening she was made aware of this.  Bibasilar atelectasis without evidence of infection or edema.  Given lack of fever, new productive cough, or findings on CT scan will not treat with antibiotics but will treat with albuterol and  steroids in case this represents COPD.    Patient was given albuterol neb.  I also gave steroids in case this represents COPD exacerbation.    Covid negative.     Patient be admitted to the hospital service for serial troponins, likely additional cardiac testing, and symptomatic management.  Positive shortness of breath remains unknown differential still includes covid with false-negative versus cardiac versus COPD exacerbation.  She remained vitally stable while in the emergency department.        I have reviewed the nursing notes.    I have reviewed the findings, diagnosis, plan and need for follow up with the patient.    New Prescriptions    No medications on file       Final diagnoses:   SOB (shortness of breath)       6/10/2020   [facility], [place], EMERGENCY DEPARTMENT     Joe Minor MD  06/10/20 9036

## 2020-06-10 NOTE — LETTER
Health Information Management Services               Recipient: [facility]           Sender:    NAOMI Cuenca, [facility]  Acute Care   [facility]  Pager: [phone]  Ph: [phone]          Date: June 12, 2020  Patient Name:  Sonya Foote  Routing Message:  Discharge orders for pt. Let me know if you need anything else. Thanks!          The documents accompanying this notice contain confidential information belonging to the sender.  This information is intended only for the use of the individual or entity named above.  The authorized recipient of this information is prohibited from disclosing this information to any other party and is required to destroy the information after its stated need has been fulfilled, unless otherwise required by state law.      If you are not the intended recipient, you are hereby notified that any disclosure, copy, distribution or action taken in reliance on the contents of these documents is strictly prohibited.  If you have received this document in error, please return it by fax to [phone] with a note on the cover sheet explaining why you are returning it (e.g. not your patient, not your provider, etc.).  If you need further assistance, please call [place] Centralized Transcription at [phone].  Documents may also be returned by mail to [place], , [address], [place], [place] [address].

## 2020-06-10 NOTE — TELEPHONE ENCOUNTER
Patient was just seen in the clinic 1 week ago in clinic for evaluation of nosebleeds thus it is unclear why she cannot be seen similarly if symptoms persist.  I would consider the emergency room only if there is emergent need for an acute evaluation and the patient is unable to be seen/transported elsewhere.

## 2020-06-10 NOTE — ED TRIAGE NOTES
Coming in CP and SOA starting yesterday and getting worse. Increase use of inhaler. Lives in facility with Covid + individuals.

## 2020-06-10 NOTE — TELEPHONE ENCOUNTER
[facility] center calling with new sx for pt    Pt had nose cauterized yesterday.    Pt reports today feeling weak, lightheaded today.  Also complaining of headache.  Was tested a couple weeks ago for covid which was negative.  No positive cases on her floor.  Blood sugar 123, bp 103/60, pulse 80, O2 sats 96%    Ellie [phone]    They are required to report any new sx.  Any advisement at this time?  Just observe?  oncare?

## 2020-06-10 NOTE — ED NOTES
[facility], [place]   ED Nurse to Floor Handoff     Sonya Foote is a 70 year old female who speaks English and lives with others,  in a nursing home  They arrived in the ED by ambulance from home    ED Chief Complaint: Chest Pain and Shortness of Breath    ED Dx;   Final diagnoses:   SOB (shortness of breath)         Needed?: No    Allergies:   Allergies   Allergen Reactions     Bee Venom Anaphylaxis     Penicillins Anaphylaxis     Dilantin [Phenytoin] Other (See Comments)     Generic dilantin only per pt     Iodine Hives     Novocaine [Procaine] Hives     Tositumomab Unknown   .  Past Medical Hx:   Past Medical History:   Diagnosis Date     Acid reflux disease      Amputation above knee (H)     bilateral     Benign essential hypertension      Blind left eye     due to central retinal artery occlusion     CAD (coronary artery disease)     UA and inferior MI in 2016 s/p PCI     Chronic back pain      Chronic neck pain      Chronic pain syndrome     with fentanyl intrathecal pain pump     Complex sleep apnea syndrome      COPD (chronic obstructive pulmonary disease) (H)      Dysphagia     chronic due to esophageal strictures and multiple previous dilitations      ROGER (generalized anxiety disorder)      History of blood transfusion     x5; no adverse reactions     History of central retinal artery occlusion 2006    left-sided     History of stroke     residual left-sided weakness     Hx of carotid artery stenosis     s/p left carotid stent in 2006 and balloon angioplasty in 2016     MDD (major depressive disorder)      Neurogenic bladder     SPT in place     JOSE (obstructive sleep apnea)      Osteoporosis      PAD (peripheral artery disease) (H)      Peripheral neuropathy      Person who has had sex change operation     male to female     Seizure disorder (H)     many years since last grand mal; daily, brief petit mals     Sickle cell trait (H)      Type 2 diabetes mellitus (H)        Baseline Mental status: WDL  Current Mental Status changes: at basesline    Infection present or suspected this encounter: yes respiratory  Sepsis suspected: No  Isolation type: Special Precautions-COVID-19     Activity level - Baseline/Home:  Wheelchair  Activity Level - Current:   Wheelchair Pt bilateral leg amputee    Bariatric equipment needed?: No    In the ED these meds were given:   Medications   iopamidol (ISOVUE-370) solution 52 mL (52 mLs Intravenous Given 6/10/20 1615)   sodium chloride (PF) 0.9% PF flush 84 mL (84 mLs Intravenous Given 6/10/20 1615)   predniSONE (DELTASONE) tablet 40 mg (40 mg Oral Given 6/10/20 1805)       Drips running?  No    Home pump  No    Current LDAs  Peripheral IV 06/10/20 Right Upper arm (Active)   Site Assessment [facility] 06/10/20 1406   Line Status Saline locked 06/10/20 1406   Phlebitis Scale 0-->no symptoms 06/10/20 1406   Number of days: 0       Suprapubic Catheter Non-latex 16 fr (Active)   Site Description [facility] 06/10/20 1423   Output (mL) 1500 mL 06/10/20 1809   Number of days: 20       Pressure Injury 06/10/20 Medial Coccyx small open area (Active)   Wound Base Erythema, non-blanchable 06/10/20 1424   Estimated Circumference ( if not measured pea sized 06/10/20 1424   Drainage Amount None 06/10/20 1424   Wound Care/Cleansing Soap and water 06/10/20 1454   Dressing Foam;Non adherent 06/10/20 1454   Number of days: 0       Wound 05/02/18 Right;Upper Thigh (Active)   Number of days: 770       Wound 05/02/18 Left Finger (Comment which one) (Active)   Number of days: 770       Incision/Surgical Site 01/16/18 Abdomen (Active)   Number of days: 876       Labs results:   Labs Ordered and Resulted from Time of ED Arrival Up to the Time of Departure from the ED   COMPREHENSIVE METABOLIC PANEL - Abnormal; Notable for the following components:       Result Value    Bilirubin Total 0.1 (*)     Albumin 2.9 (*)     Alkaline Phosphatase 263 (*)     ALT 62 (*)     AST 47 (*)     All  other components within normal limits   D DIMER QUANTITATIVE - Abnormal; Notable for the following components:    D Dimer 0.6 (*)     All other components within normal limits   BLOOD GAS VENOUS - Abnormal; Notable for the following components:    PCO2 Venous 58 (*)     Bicarbonate Venous 31 (*)     All other components within normal limits   CBC WITH PLATELETS DIFFERENTIAL   TROPONIN I   NT PROBNP INPATIENT   COVID-19 VIRUS (CORONAVIRUS) BY PCR   SARS-COV-2 (COVID-19) VIRUS RT-PCR       Imaging Studies:   Recent Results (from the past 24 hour(s))   XR Chest Port 1 View    Narrative    EXAM: XR CHEST PORT 1 VW  6/10/2020 1:50 PM     HISTORY:  SOB       COMPARISON:  8/27/2019    FINDINGS:   AP view the chest. Midline trachea. Cardiac silhouette is partially  obscured on the left, however appears slightly enlarged. Aortic  atherosclerosis. Diffuse mixed interstitial and patchy pulmonary  opacities, more pronounced at the lung bases, left greater than right.  No pneumothorax. Likely small left effusion. No right-sided effusion.  Upper abdomen is unremarkable. No acute osseous abnormality.         Impression    IMPRESSION:   .1. Diffuse mixed interstitial and patchy pulmonary opacities, more  pronounced at the lung bases, left greater than right. Differential  includes edema and/or infection.  2. Small left pleural effusion.   3. Aortic atherosclerosis.    I have personally reviewed the examination and initial interpretation  and I agree with the findings.    VERA DENIS MD   CT Chest Pulmonary Embolism w Contrast    Narrative    Chest CT pulmonary angiogram with contrast    INDICATION: Shortness of breath    Contrast: 52 mL intravenous Isovue-370    COMPARISON: 5/2/2018    FINDINGS: Diffuse esophageal wall thickening, especially proximally  and distally. Small hiatal hernia. If there is history of dysphagia  comment please correlate with esophagram and/or upper GI endoscopy as  clinically appropriate. Left  "atrial cardiac chamber is enlarged. Upper  abdomen was grossly unremarkable. Thyroid appears grossly  unremarkable. Subcentimeter right hilar lymph node appears unchanged.  No enlarged axillary or mediastinal lymph nodes. Atherosclerotic  calcification in aorta and coronary arteries. No dominant pulmonary  nodule or groundglass opacity. Areas of scarring and subsegmental  atelectasis are present in both lung bases, slightly increased from  previous. Bones show degenerative disc changes throughout the thoracic  spine. Excessive kyphosis in the thorax.  The pulmonary artery tree was well-opacified with contrast comment no  evidence of film defect indicate embolus.      Impression    IMPRESSION: No CT evidence for pulmonary embolus. Esophageal  thickening comment please consider nonemergent esophagram or upper GI  endoscopy as clinically appropriate. Small hiatal hernia. Aortic and  coronary artery atherosclerosis. Bibasilar atelectasis and scarring  bilaterally. Excessive thoracic kyphosis.    VERA DENIS MD       Recent vital signs:   /60   Pulse 61   Temp 99.4  F (37.4  C) (Oral)   Resp 16   Ht 1.092 m (3' 7\")   Wt 59 kg (130 lb)   SpO2 90%   BMI 49.43 kg/m      [place] Coma Scale Score: 15 (06/10/20 1330)       Cardiac Rhythm: Normal Sinus  Pt needs tele? Yes  Skin/wound Issues: pressure wound at coccyx, dressing in place    Code Status: Full Code    Pain control: good    Nausea control: pt had none    Abnormal labs/tests/findings requiring intervention: See results    Family present during ED course? No   Family Comments/Social Situation comments:     Tasks needing completion: None    Azael Bey RN  [phone] [facility]      "

## 2020-06-10 NOTE — TELEPHONE ENCOUNTER
"Ellie informed but says patient cannot go to  because would not have a vehicle to do that. So would have to go by ambulance in hospital. Would you agree that she should go by ambulance to hospital if the symptoms persist or worsen or she has recurrent nosebleeds\"?  "

## 2020-06-10 NOTE — ED NOTES
Bed: ED37  Expected date:   Expected time:   Means of arrival:   Comments:  [id number]  70F  SOB  Coming from facility with covid positive residents   [place]

## 2020-06-10 NOTE — LETTER
Health Information Management Services               Recipient: [facility]           Sender:  NAOMI Cuenca, [facility]  Acute Care Float   [facility]  Pager: [phone]  Ph: [phone]          Date: June 12, 2020  Patient Name:  Sonya Foote  Routing Message:  Confirmed discharge back to LTC today. Discharge orders. Will send summary when available.           The documents accompanying this notice contain confidential information belonging to the sender.  This information is intended only for the use of the individual or entity named above.  The authorized recipient of this information is prohibited from disclosing this information to any other party and is required to destroy the information after its stated need has been fulfilled, unless otherwise required by state law.      If you are not the intended recipient, you are hereby notified that any disclosure, copy, distribution or action taken in reliance on the contents of these documents is strictly prohibited.  If you have received this document in error, please return it by fax to [phone] with a note on the cover sheet explaining why you are returning it (e.g. not your patient, not your provider, etc.).  If you need further assistance, please call [place]/[employer] Centralized Transcription at [phone].  Documents may also be returned by mail to [place], , [address], [place], [place] [address].

## 2020-06-10 NOTE — TELEPHONE ENCOUNTER
Suggest observation at present and if the symptoms persist or worsen or she has recurrent nosebleeds that the patient best be suited to be seen in urgent care due to her multiple medical issues

## 2020-06-10 NOTE — LETTER
Transition Communication Hand-off for Care Transitions to Next Level of Care Provider    Name: Sonya Foote  : 1950  MRN #: [id number]  Primary Care Provider: Allan Casey     Primary Clinic: [address] [facility] [address]     Reason for Hospitalization:  SOB (shortness of breath) [R06.02]  Admit Date/Time: 6/10/2020 12:44 PM  Discharge Date: 2020  Payor Source: Payor: MEDICA / Plan: MEDICA D[employer]IONS [employer] / Product Type: HMO /     No major concerns          Reason for Communication Hand-off Referral: Other Returning to LTC    Discharge Plan:       Concern for non-adherence with plan of care:   N   Discharge Needs Assessment:  Needs      Most Recent Value   Anticipated Changes Related to Illness  none          Follow-up specialty is recommended: Yes    Follow-up plan:    Future Appointments   Date Time Provider Department Center   2020 10:20 AM Evelyn Lima MD [facility]   2020 10:30 AM Gustavo Wright MD [facility]       Any outstanding tests or procedures:        Referrals     Future Labs/Procedures    Occupational Therapy Adult Consult     Comments:    Evaluate and treat as clinically indicated.    Reason:  NH    Physical Therapy Adult Consult     Comments:    Evaluate and treat as clinically indicated.    Reason:  NH    PULMONARY MEDICINE REFERRAL     Comments:    Your provider has referred you to: [facility]: [facility] [phone]   [email or web address]    Follow up with primary lung doctor Dr. Higgins     Please be aware that coverage of these services is subject to the terms and limitations of your health insurance plan.  Call member services at your health plan with any benefit or coverage questions.      Please bring the following with you to your appointment:    (1) Any X-Rays, CTs or MRIs which have been performed.  Contact the facility where they were done to  arrange for  prior to your scheduled appointment.    (2) List of current medications   (3) This referral request   (4) Any documents/labs given to you for this referral              NAOMI Cuenca, [facility]  Acute Care [facility]   [facility]  Pager: [phone]      AVS/Discharge Summary is the source of truth; this is a helpful guide for improved communication of patient story

## 2020-06-11 ENCOUNTER — APPOINTMENT (OUTPATIENT)
Dept: CARDIOLOGY | Facility: CLINIC | Age: 70
DRG: 369 | End: 2020-06-11
Attending: STUDENT IN AN ORGANIZED HEALTH CARE EDUCATION/TRAINING PROGRAM
Payer: COMMERCIAL

## 2020-06-11 LAB
ALBUMIN SERPL-MCNC: 2.8 G/DL (ref 3.4–5)
ALBUMIN UR-MCNC: NEGATIVE MG/DL
ALP SERPL-CCNC: 262 U/L (ref 40–150)
ALT SERPL W P-5'-P-CCNC: 64 U/L (ref 0–50)
ANION GAP SERPL CALCULATED.3IONS-SCNC: 5 MMOL/L (ref 3–14)
ANION GAP SERPL CALCULATED.3IONS-SCNC: 5 MMOL/L (ref 3–14)
APPEARANCE UR: ABNORMAL
APTT PPP: 27 SEC (ref 22–37)
AST SERPL W P-5'-P-CCNC: 44 U/L (ref 0–45)
AT III ACT/NOR PPP CHRO: 121 % (ref 85–135)
BACTERIA #/AREA URNS HPF: ABNORMAL /HPF
BASOPHILS # BLD AUTO: 0 10E9/L (ref 0–0.2)
BASOPHILS NFR BLD AUTO: 0.2 %
BILIRUB SERPL-MCNC: 0.1 MG/DL (ref 0.2–1.3)
BILIRUB UR QL STRIP: NEGATIVE
BUN SERPL-MCNC: 10 MG/DL (ref 7–30)
BUN SERPL-MCNC: 8 MG/DL (ref 7–30)
CALCIUM SERPL-MCNC: 8.8 MG/DL (ref 8.5–10.1)
CALCIUM SERPL-MCNC: 8.8 MG/DL (ref 8.5–10.1)
CHLORIDE SERPL-SCNC: 107 MMOL/L (ref 94–109)
CHLORIDE SERPL-SCNC: 108 MMOL/L (ref 94–109)
CK SERPL-CCNC: 73 U/L (ref 30–225)
CO2 SERPL-SCNC: 24 MMOL/L (ref 20–32)
CO2 SERPL-SCNC: 25 MMOL/L (ref 20–32)
COLOR UR AUTO: YELLOW
CREAT SERPL-MCNC: 0.51 MG/DL (ref 0.52–1.04)
CREAT SERPL-MCNC: 0.57 MG/DL (ref 0.52–1.04)
CRP SERPL-MCNC: 33 MG/L (ref 0–8)
D DIMER PPP FEU-MCNC: 0.6 UG/ML FEU (ref 0–0.5)
DIFFERENTIAL METHOD BLD: ABNORMAL
EOSINOPHIL # BLD AUTO: 0.1 10E9/L (ref 0–0.7)
EOSINOPHIL NFR BLD AUTO: 0.4 %
ERYTHROCYTE [DISTWIDTH] IN BLOOD BY AUTOMATED COUNT: 13.3 % (ref 10–15)
FERRITIN SERPL-MCNC: 35 NG/ML (ref 8–252)
FIBRINOGEN PPP-MCNC: 497 MG/DL (ref 200–420)
FIBRINOGEN PPP-MCNC: 518 MG/DL (ref 200–420)
GFR SERPL CREATININE-BSD FRML MDRD: >90 ML/MIN/{1.73_M2}
GFR SERPL CREATININE-BSD FRML MDRD: >90 ML/MIN/{1.73_M2}
GLUCOSE SERPL-MCNC: 102 MG/DL (ref 70–99)
GLUCOSE SERPL-MCNC: 95 MG/DL (ref 70–99)
GLUCOSE UR STRIP-MCNC: NEGATIVE MG/DL
HCT VFR BLD AUTO: 34.6 % (ref 35–47)
HGB BLD-MCNC: 11.7 G/DL (ref 11.7–15.7)
HGB UR QL STRIP: ABNORMAL
IL6 SERPL-MCNC: 8.52 PG/ML
IMM GRANULOCYTES # BLD: 0 10E9/L (ref 0–0.4)
IMM GRANULOCYTES NFR BLD: 0.3 %
INR PPP: 1.06 (ref 0.86–1.14)
INR PPP: 1.12 (ref 0.86–1.14)
KETONES UR STRIP-MCNC: NEGATIVE MG/DL
LDH SERPL L TO P-CCNC: 124 U/L (ref 81–234)
LDH SERPL L TO P-CCNC: 136 U/L (ref 81–234)
LEUKOCYTE ESTERASE UR QL STRIP: ABNORMAL
LYMPHOCYTES # BLD AUTO: 1.1 10E9/L (ref 0.8–5.3)
LYMPHOCYTES NFR BLD AUTO: 9.4 %
MCH RBC QN AUTO: 29.3 PG (ref 26.5–33)
MCHC RBC AUTO-ENTMCNC: 33.8 G/DL (ref 31.5–36.5)
MCV RBC AUTO: 87 FL (ref 78–100)
MONOCYTES # BLD AUTO: 0.6 10E9/L (ref 0–1.3)
MONOCYTES NFR BLD AUTO: 5 %
MUCOUS THREADS #/AREA URNS LPF: PRESENT /LPF
NEUTROPHILS # BLD AUTO: 9.8 10E9/L (ref 1.6–8.3)
NEUTROPHILS NFR BLD AUTO: 84.7 %
NITRATE UR QL: NEGATIVE
NRBC # BLD AUTO: 0 10*3/UL
NRBC BLD AUTO-RTO: 0 /100
PH UR STRIP: 6 PH (ref 5–7)
PLATELET # BLD AUTO: 287 10E9/L (ref 150–450)
POTASSIUM SERPL-SCNC: 4.2 MMOL/L (ref 3.4–5.3)
POTASSIUM SERPL-SCNC: 4.6 MMOL/L (ref 3.4–5.3)
PROT SERPL-MCNC: 8.5 G/DL (ref 6.8–8.8)
RBC # BLD AUTO: 4 10E12/L (ref 3.8–5.2)
RBC #/AREA URNS AUTO: 2 /HPF (ref 0–2)
RETICS # AUTO: 60.4 10E9/L (ref 25–95)
RETICS # AUTO: 67.5 10E9/L (ref 25–95)
RETICS/RBC NFR AUTO: 1.5 % (ref 0.5–2)
RETICS/RBC NFR AUTO: 1.5 % (ref 0.5–2)
SODIUM SERPL-SCNC: 137 MMOL/L (ref 133–144)
SODIUM SERPL-SCNC: 137 MMOL/L (ref 133–144)
SOURCE: ABNORMAL
SP GR UR STRIP: 1.01 (ref 1–1.03)
SQUAMOUS #/AREA URNS AUTO: <1 /HPF (ref 0–1)
TRANS CELLS #/AREA URNS HPF: <1 /HPF (ref 0–1)
TROPONIN I SERPL-MCNC: <0.015 UG/L (ref 0–0.04)
TSH SERPL DL<=0.005 MIU/L-ACNC: 1.09 MU/L (ref 0.4–4)
UROBILINOGEN UR STRIP-MCNC: NORMAL MG/DL (ref 0–2)
WBC # BLD AUTO: 11.6 10E9/L (ref 4–11)
WBC #/AREA URNS AUTO: 22 /HPF (ref 0–5)

## 2020-06-11 PROCEDURE — 36415 COLL VENOUS BLD VENIPUNCTURE: CPT | Performed by: STUDENT IN AN ORGANIZED HEALTH CARE EDUCATION/TRAINING PROGRAM

## 2020-06-11 PROCEDURE — 87486 CHLMYD PNEUM DNA AMP PROBE: CPT | Performed by: STUDENT IN AN ORGANIZED HEALTH CARE EDUCATION/TRAINING PROGRAM

## 2020-06-11 PROCEDURE — 87088 URINE BACTERIA CULTURE: CPT | Performed by: STUDENT IN AN ORGANIZED HEALTH CARE EDUCATION/TRAINING PROGRAM

## 2020-06-11 PROCEDURE — 25000132 ZZH RX MED GY IP 250 OP 250 PS 637: Performed by: STUDENT IN AN ORGANIZED HEALTH CARE EDUCATION/TRAINING PROGRAM

## 2020-06-11 PROCEDURE — 99207 ZZC CDG-MDM COMPONENT: MEETS LOW - DOWN CODED: CPT | Performed by: STUDENT IN AN ORGANIZED HEALTH CARE EDUCATION/TRAINING PROGRAM

## 2020-06-11 PROCEDURE — 25000128 H RX IP 250 OP 636: Performed by: STUDENT IN AN ORGANIZED HEALTH CARE EDUCATION/TRAINING PROGRAM

## 2020-06-11 PROCEDURE — 87581 M.PNEUMON DNA AMP PROBE: CPT | Performed by: STUDENT IN AN ORGANIZED HEALTH CARE EDUCATION/TRAINING PROGRAM

## 2020-06-11 PROCEDURE — 85379 FIBRIN DEGRADATION QUANT: CPT | Performed by: STUDENT IN AN ORGANIZED HEALTH CARE EDUCATION/TRAINING PROGRAM

## 2020-06-11 PROCEDURE — 86140 C-REACTIVE PROTEIN: CPT | Performed by: STUDENT IN AN ORGANIZED HEALTH CARE EDUCATION/TRAINING PROGRAM

## 2020-06-11 PROCEDURE — 85610 PROTHROMBIN TIME: CPT | Performed by: STUDENT IN AN ORGANIZED HEALTH CARE EDUCATION/TRAINING PROGRAM

## 2020-06-11 PROCEDURE — 83520 IMMUNOASSAY QUANT NOS NONAB: CPT | Performed by: STUDENT IN AN ORGANIZED HEALTH CARE EDUCATION/TRAINING PROGRAM

## 2020-06-11 PROCEDURE — 82728 ASSAY OF FERRITIN: CPT | Performed by: STUDENT IN AN ORGANIZED HEALTH CARE EDUCATION/TRAINING PROGRAM

## 2020-06-11 PROCEDURE — 82550 ASSAY OF CK (CPK): CPT | Performed by: STUDENT IN AN ORGANIZED HEALTH CARE EDUCATION/TRAINING PROGRAM

## 2020-06-11 PROCEDURE — 87186 SC STD MICRODIL/AGAR DIL: CPT | Performed by: STUDENT IN AN ORGANIZED HEALTH CARE EDUCATION/TRAINING PROGRAM

## 2020-06-11 PROCEDURE — 99232 SBSQ HOSP IP/OBS MODERATE 35: CPT | Performed by: STUDENT IN AN ORGANIZED HEALTH CARE EDUCATION/TRAINING PROGRAM

## 2020-06-11 PROCEDURE — 85384 FIBRINOGEN ACTIVITY: CPT | Performed by: STUDENT IN AN ORGANIZED HEALTH CARE EDUCATION/TRAINING PROGRAM

## 2020-06-11 PROCEDURE — 80048 BASIC METABOLIC PNL TOTAL CA: CPT | Performed by: STUDENT IN AN ORGANIZED HEALTH CARE EDUCATION/TRAINING PROGRAM

## 2020-06-11 PROCEDURE — 85730 THROMBOPLASTIN TIME PARTIAL: CPT | Performed by: STUDENT IN AN ORGANIZED HEALTH CARE EDUCATION/TRAINING PROGRAM

## 2020-06-11 PROCEDURE — 85025 COMPLETE CBC W/AUTO DIFF WBC: CPT | Performed by: STUDENT IN AN ORGANIZED HEALTH CARE EDUCATION/TRAINING PROGRAM

## 2020-06-11 PROCEDURE — 25000125 ZZHC RX 250: Performed by: STUDENT IN AN ORGANIZED HEALTH CARE EDUCATION/TRAINING PROGRAM

## 2020-06-11 PROCEDURE — 85300 ANTITHROMBIN III ACTIVITY: CPT | Performed by: STUDENT IN AN ORGANIZED HEALTH CARE EDUCATION/TRAINING PROGRAM

## 2020-06-11 PROCEDURE — 81001 URINALYSIS AUTO W/SCOPE: CPT | Performed by: STUDENT IN AN ORGANIZED HEALTH CARE EDUCATION/TRAINING PROGRAM

## 2020-06-11 PROCEDURE — 83615 LACTATE (LD) (LDH) ENZYME: CPT | Performed by: STUDENT IN AN ORGANIZED HEALTH CARE EDUCATION/TRAINING PROGRAM

## 2020-06-11 PROCEDURE — 12000012 ZZH R&B MS OVERFLOW UMMC

## 2020-06-11 PROCEDURE — 84484 ASSAY OF TROPONIN QUANT: CPT | Performed by: STUDENT IN AN ORGANIZED HEALTH CARE EDUCATION/TRAINING PROGRAM

## 2020-06-11 PROCEDURE — G0463 HOSPITAL OUTPT CLINIC VISIT: HCPCS

## 2020-06-11 PROCEDURE — 87633 RESP VIRUS 12-25 TARGETS: CPT | Performed by: STUDENT IN AN ORGANIZED HEALTH CARE EDUCATION/TRAINING PROGRAM

## 2020-06-11 PROCEDURE — 85045 AUTOMATED RETICULOCYTE COUNT: CPT | Performed by: STUDENT IN AN ORGANIZED HEALTH CARE EDUCATION/TRAINING PROGRAM

## 2020-06-11 PROCEDURE — 84443 ASSAY THYROID STIM HORMONE: CPT | Performed by: STUDENT IN AN ORGANIZED HEALTH CARE EDUCATION/TRAINING PROGRAM

## 2020-06-11 PROCEDURE — 93306 TTE W/DOPPLER COMPLETE: CPT | Mod: 26 | Performed by: INTERNAL MEDICINE

## 2020-06-11 PROCEDURE — 87086 URINE CULTURE/COLONY COUNT: CPT | Performed by: STUDENT IN AN ORGANIZED HEALTH CARE EDUCATION/TRAINING PROGRAM

## 2020-06-11 PROCEDURE — 80053 COMPREHEN METABOLIC PANEL: CPT | Performed by: STUDENT IN AN ORGANIZED HEALTH CARE EDUCATION/TRAINING PROGRAM

## 2020-06-11 PROCEDURE — 93306 TTE W/DOPPLER COMPLETE: CPT

## 2020-06-11 RX ORDER — ESCITALOPRAM OXALATE 10 MG/1
10 TABLET ORAL DAILY
Status: DISCONTINUED | OUTPATIENT
Start: 2020-06-11 | End: 2020-06-12 | Stop reason: HOSPADM

## 2020-06-11 RX ORDER — ATORVASTATIN CALCIUM 40 MG/1
40 TABLET, FILM COATED ORAL DAILY
Status: DISCONTINUED | OUTPATIENT
Start: 2020-06-11 | End: 2020-06-12 | Stop reason: HOSPADM

## 2020-06-11 RX ORDER — CEFTRIAXONE 1 G/1
1 INJECTION, POWDER, FOR SOLUTION INTRAMUSCULAR; INTRAVENOUS EVERY 24 HOURS
Status: DISCONTINUED | OUTPATIENT
Start: 2020-06-11 | End: 2020-06-11

## 2020-06-11 RX ORDER — NICOTINE 21 MG/24HR
1 PATCH, TRANSDERMAL 24 HOURS TRANSDERMAL DAILY
Status: DISCONTINUED | OUTPATIENT
Start: 2020-06-11 | End: 2020-06-12 | Stop reason: HOSPADM

## 2020-06-11 RX ORDER — LORATADINE 10 MG/1
10 TABLET ORAL DAILY
Status: DISCONTINUED | OUTPATIENT
Start: 2020-06-11 | End: 2020-06-12 | Stop reason: HOSPADM

## 2020-06-11 RX ORDER — OXYCODONE HYDROCHLORIDE 5 MG/1
5 TABLET ORAL EVERY 6 HOURS PRN
Status: DISCONTINUED | OUTPATIENT
Start: 2020-06-11 | End: 2020-06-12 | Stop reason: HOSPADM

## 2020-06-11 RX ORDER — TRAZODONE HYDROCHLORIDE 50 MG/1
150 TABLET, FILM COATED ORAL AT BEDTIME
Status: DISCONTINUED | OUTPATIENT
Start: 2020-06-11 | End: 2020-06-12 | Stop reason: HOSPADM

## 2020-06-11 RX ORDER — FLUCONAZOLE 200 MG/1
200 TABLET ORAL ONCE
Status: COMPLETED | OUTPATIENT
Start: 2020-06-11 | End: 2020-06-11

## 2020-06-11 RX ORDER — POLYETHYLENE GLYCOL 3350 17 G/17G
17 POWDER, FOR SOLUTION ORAL DAILY
Status: DISCONTINUED | OUTPATIENT
Start: 2020-06-11 | End: 2020-06-12 | Stop reason: HOSPADM

## 2020-06-11 RX ORDER — DORZOLAMIDE HCL 20 MG/ML
1 SOLUTION/ DROPS OPHTHALMIC 2 TIMES DAILY
Status: DISCONTINUED | OUTPATIENT
Start: 2020-06-11 | End: 2020-06-12 | Stop reason: HOSPADM

## 2020-06-11 RX ORDER — NALOXONE HYDROCHLORIDE 0.4 MG/ML
.1-.4 INJECTION, SOLUTION INTRAMUSCULAR; INTRAVENOUS; SUBCUTANEOUS
Status: DISCONTINUED | OUTPATIENT
Start: 2020-06-11 | End: 2020-06-12 | Stop reason: HOSPADM

## 2020-06-11 RX ORDER — SWAB
2 SWAB, NON-MEDICATED MISCELLANEOUS DAILY
COMMUNITY
End: 2021-07-20

## 2020-06-11 RX ORDER — LATANOPROST 50 UG/ML
1 SOLUTION/ DROPS OPHTHALMIC AT BEDTIME
Status: DISCONTINUED | OUTPATIENT
Start: 2020-06-11 | End: 2020-06-12 | Stop reason: HOSPADM

## 2020-06-11 RX ORDER — METOPROLOL SUCCINATE 50 MG/1
50 TABLET, EXTENDED RELEASE ORAL DAILY
Status: DISCONTINUED | OUTPATIENT
Start: 2020-06-11 | End: 2020-06-12 | Stop reason: HOSPADM

## 2020-06-11 RX ORDER — FLUCONAZOLE 100 MG/1
100 TABLET ORAL DAILY
Status: DISCONTINUED | OUTPATIENT
Start: 2020-06-12 | End: 2020-06-12 | Stop reason: HOSPADM

## 2020-06-11 RX ORDER — VITAMIN B COMPLEX
2000 TABLET ORAL DAILY
Status: DISCONTINUED | OUTPATIENT
Start: 2020-06-11 | End: 2020-06-12 | Stop reason: HOSPADM

## 2020-06-11 RX ORDER — CEFTRIAXONE 1 G/1
1 INJECTION, POWDER, FOR SOLUTION INTRAMUSCULAR; INTRAVENOUS EVERY 24 HOURS
Status: DISCONTINUED | OUTPATIENT
Start: 2020-06-11 | End: 2020-06-12

## 2020-06-11 RX ORDER — FLUTICASONE PROPIONATE 50 MCG
1 SPRAY, SUSPENSION (ML) NASAL DAILY
Status: DISCONTINUED | OUTPATIENT
Start: 2020-06-11 | End: 2020-06-12 | Stop reason: HOSPADM

## 2020-06-11 RX ORDER — LEVETIRACETAM 500 MG/1
500 TABLET ORAL 2 TIMES DAILY
Status: DISCONTINUED | OUTPATIENT
Start: 2020-06-11 | End: 2020-06-12 | Stop reason: HOSPADM

## 2020-06-11 RX ORDER — ASPIRIN 81 MG/1
81 TABLET ORAL DAILY
Status: DISCONTINUED | OUTPATIENT
Start: 2020-06-11 | End: 2020-06-12 | Stop reason: HOSPADM

## 2020-06-11 RX ORDER — PREGABALIN 50 MG/1
150 CAPSULE ORAL 3 TIMES DAILY
Status: DISCONTINUED | OUTPATIENT
Start: 2020-06-11 | End: 2020-06-12 | Stop reason: HOSPADM

## 2020-06-11 RX ORDER — SULFAMETHOXAZOLE/TRIMETHOPRIM 800-160 MG
1 TABLET ORAL 2 TIMES DAILY
Status: ON HOLD | COMMUNITY
Start: 2020-06-10 | End: 2020-06-12

## 2020-06-11 RX ORDER — LIDOCAINE 4 G/G
1 PATCH TOPICAL EVERY 24 HOURS
Status: DISCONTINUED | OUTPATIENT
Start: 2020-06-11 | End: 2020-06-12 | Stop reason: HOSPADM

## 2020-06-11 RX ORDER — MUPIROCIN 20 MG/G
OINTMENT TOPICAL DAILY
COMMUNITY
Start: 2020-06-10 | End: 2020-08-19

## 2020-06-11 RX ORDER — PHENYTOIN SODIUM 100 MG/1
200 CAPSULE, EXTENDED RELEASE ORAL 2 TIMES DAILY
Status: DISCONTINUED | OUTPATIENT
Start: 2020-06-11 | End: 2020-06-12 | Stop reason: HOSPADM

## 2020-06-11 RX ORDER — BRIMONIDINE TARTRATE 2 MG/ML
1 SOLUTION/ DROPS OPHTHALMIC 2 TIMES DAILY
Status: DISCONTINUED | OUTPATIENT
Start: 2020-06-11 | End: 2020-06-12 | Stop reason: HOSPADM

## 2020-06-11 RX ORDER — LISINOPRIL 20 MG/1
20 TABLET ORAL DAILY
Status: DISCONTINUED | OUTPATIENT
Start: 2020-06-11 | End: 2020-06-12 | Stop reason: HOSPADM

## 2020-06-11 RX ORDER — PANTOPRAZOLE SODIUM 40 MG/1
40 TABLET, DELAYED RELEASE ORAL
Status: DISCONTINUED | OUTPATIENT
Start: 2020-06-11 | End: 2020-06-12 | Stop reason: HOSPADM

## 2020-06-11 RX ADMIN — LATANOPROST 1 DROP: 50 SOLUTION OPHTHALMIC at 23:09

## 2020-06-11 RX ADMIN — OXYCODONE HYDROCHLORIDE 5 MG: 5 TABLET ORAL at 15:10

## 2020-06-11 RX ADMIN — LATANOPROST 1 DROP: 50 SOLUTION OPHTHALMIC at 01:25

## 2020-06-11 RX ADMIN — DORZOLAMIDE HYDROCHLORIDE 1 DROP: 20 SOLUTION/ DROPS OPHTHALMIC at 20:42

## 2020-06-11 RX ADMIN — PREGABALIN 150 MG: 50 CAPSULE ORAL at 09:33

## 2020-06-11 RX ADMIN — BRIMONIDINE TARTRATE 1 DROP: 2 SOLUTION/ DROPS OPHTHALMIC at 01:31

## 2020-06-11 RX ADMIN — LIDOCAINE 1 PATCH: 560 PATCH PERCUTANEOUS; TOPICAL; TRANSDERMAL at 08:57

## 2020-06-11 RX ADMIN — LISINOPRIL 20 MG: 20 TABLET ORAL at 08:56

## 2020-06-11 RX ADMIN — LEVETIRACETAM 500 MG: 500 TABLET ORAL at 20:36

## 2020-06-11 RX ADMIN — EXTENDED PHENYTOIN SODIUM 200 MG: 100 CAPSULE ORAL at 08:56

## 2020-06-11 RX ADMIN — DICLOFENAC 2 G: 10 GEL TOPICAL at 08:58

## 2020-06-11 RX ADMIN — OXYCODONE HYDROCHLORIDE 5 MG: 5 TABLET ORAL at 09:33

## 2020-06-11 RX ADMIN — SENNOSIDES AND DOCUSATE SODIUM 2 TABLET: 8.6; 5 TABLET ORAL at 08:54

## 2020-06-11 RX ADMIN — ACETAMINOPHEN 650 MG: 325 TABLET, FILM COATED ORAL at 04:34

## 2020-06-11 RX ADMIN — FLUTICASONE PROPIONATE 1 SPRAY: 50 SPRAY, METERED NASAL at 08:55

## 2020-06-11 RX ADMIN — EXTENDED PHENYTOIN SODIUM 200 MG: 100 CAPSULE ORAL at 20:36

## 2020-06-11 RX ADMIN — DICLOFENAC 2 G: 10 GEL TOPICAL at 11:40

## 2020-06-11 RX ADMIN — CEFTRIAXONE 1 G: 1 INJECTION, POWDER, FOR SOLUTION INTRAMUSCULAR; INTRAVENOUS at 15:10

## 2020-06-11 RX ADMIN — BRIMONIDINE TARTRATE 1 DROP: 2 SOLUTION/ DROPS OPHTHALMIC at 09:04

## 2020-06-11 RX ADMIN — PREGABALIN 150 MG: 50 CAPSULE ORAL at 13:08

## 2020-06-11 RX ADMIN — DORZOLAMIDE HYDROCHLORIDE 1 DROP: 20 SOLUTION/ DROPS OPHTHALMIC at 01:28

## 2020-06-11 RX ADMIN — METOPROLOL SUCCINATE 50 MG: 50 TABLET, EXTENDED RELEASE ORAL at 08:55

## 2020-06-11 RX ADMIN — EXTENDED PHENYTOIN SODIUM 200 MG: 100 CAPSULE ORAL at 01:26

## 2020-06-11 RX ADMIN — DICLOFENAC 2 G: 10 GEL TOPICAL at 15:10

## 2020-06-11 RX ADMIN — FLUCONAZOLE 200 MG: 200 TABLET ORAL at 11:38

## 2020-06-11 RX ADMIN — ENOXAPARIN SODIUM 40 MG: 40 INJECTION SUBCUTANEOUS at 08:57

## 2020-06-11 RX ADMIN — ESCITALOPRAM OXALATE 10 MG: 10 TABLET ORAL at 08:56

## 2020-06-11 RX ADMIN — POLYETHYLENE GLYCOL 3350 17 G: 17 POWDER, FOR SOLUTION ORAL at 08:56

## 2020-06-11 RX ADMIN — PANTOPRAZOLE SODIUM 40 MG: 40 TABLET, DELAYED RELEASE ORAL at 08:54

## 2020-06-11 RX ADMIN — TRAZODONE HYDROCHLORIDE 150 MG: 50 TABLET ORAL at 01:09

## 2020-06-11 RX ADMIN — DOCUSATE SODIUM AND SENNOSIDES 1 TABLET: 8.6; 5 TABLET ORAL at 00:13

## 2020-06-11 RX ADMIN — PREGABALIN 150 MG: 50 CAPSULE ORAL at 01:10

## 2020-06-11 RX ADMIN — NICOTINE 1 PATCH: 14 PATCH, EXTENDED RELEASE TRANSDERMAL at 08:56

## 2020-06-11 RX ADMIN — ACETAMINOPHEN 650 MG: 325 TABLET, FILM COATED ORAL at 18:32

## 2020-06-11 RX ADMIN — SENNOSIDES AND DOCUSATE SODIUM 2 TABLET: 8.6; 5 TABLET ORAL at 20:39

## 2020-06-11 RX ADMIN — TRAZODONE HYDROCHLORIDE 150 MG: 50 TABLET ORAL at 23:09

## 2020-06-11 RX ADMIN — ACETAMINOPHEN 650 MG: 325 TABLET, FILM COATED ORAL at 13:08

## 2020-06-11 RX ADMIN — LORATADINE 10 MG: 10 TABLET ORAL at 08:56

## 2020-06-11 RX ADMIN — MELATONIN 2000 UNITS: at 08:55

## 2020-06-11 RX ADMIN — DICLOFENAC 2 G: 10 GEL TOPICAL at 20:41

## 2020-06-11 RX ADMIN — DORZOLAMIDE HYDROCHLORIDE 1 DROP: 20 SOLUTION/ DROPS OPHTHALMIC at 09:04

## 2020-06-11 RX ADMIN — LEVETIRACETAM 500 MG: 500 TABLET ORAL at 08:55

## 2020-06-11 RX ADMIN — PREGABALIN 150 MG: 50 CAPSULE ORAL at 20:36

## 2020-06-11 RX ADMIN — BRIMONIDINE TARTRATE 1 DROP: 2 SOLUTION/ DROPS OPHTHALMIC at 20:41

## 2020-06-11 RX ADMIN — ATORVASTATIN CALCIUM 40 MG: 40 TABLET, FILM COATED ORAL at 08:56

## 2020-06-11 RX ADMIN — LEVETIRACETAM 500 MG: 500 TABLET ORAL at 01:10

## 2020-06-11 RX ADMIN — ASPIRIN 81 MG: 81 TABLET, COATED ORAL at 08:56

## 2020-06-11 RX ADMIN — OXYCODONE HYDROCHLORIDE 5 MG: 5 TABLET ORAL at 22:03

## 2020-06-11 ASSESSMENT — ACTIVITIES OF DAILY LIVING (ADL)
ADLS_ACUITY_SCORE: 25
WHICH_OF_THE_ABOVE_FUNCTIONAL_RISKS_HAD_A_RECENT_ONSET_OR_CHANGE?: TRANSFERRING
ADLS_ACUITY_SCORE: 21
RETIRED_COMMUNICATION: 0-->UNDERSTANDS/COMMUNICATES WITHOUT DIFFICULTY
AMBULATION: 1-->ASSISTIVE EQUIPMENT
COGNITION: 0 - NO COGNITION ISSUES REPORTED
ADLS_ACUITY_SCORE: 24
TOILETING: 1-->ASSISTIVE EQUIPMENT
FALL_HISTORY_WITHIN_LAST_SIX_MONTHS: YES
RETIRED_EATING: 1-->ASSISTIVE EQUIPMENT
ADLS_ACUITY_SCORE: 24
TRANSFERRING: 1-->ASSISTIVE EQUIPMENT
DRESS: 1-->ASSISTIVE EQUIPMENT
SWALLOWING: 0-->SWALLOWS FOODS/LIQUIDS WITHOUT DIFFICULTY
ADLS_ACUITY_SCORE: 24
NUMBER_OF_TIMES_PATIENT_HAS_FALLEN_WITHIN_LAST_SIX_MONTHS: 2
BATHING: 3-->ASSISTIVE EQUIPMENT AND PERSON
ADLS_ACUITY_SCORE: 25

## 2020-06-11 ASSESSMENT — MIFFLIN-ST. JEOR: SCORE: 742.24

## 2020-06-11 NOTE — PROGRESS NOTES
"CLINICAL NUTRITION SERVICES - ASSESSMENT NOTE     Nutrition Prescription    RECOMMENDATIONS FOR MDs/PROVIDERS TO ORDER:  Paged team for OT consult so patient can receive adaptive silverware   Recommend WOCN consult. If patient has stage 2 pressure injury start the following:   -Thera-vit-M daily  -Vitamin C (ascorbic acid) 500 mg x 10 days  -Vitamin A 20,000 units x 10 days  -Zinc Sulfate 220 mg x 10 days    Encourage oral intake     Malnutrition Status:     Unable to determine due to inability to complete all malnutrition parameters     Recommendations already ordered by Registered Dietitian (RD):  Boost Glucose vanilla HS   SF Gelatein [place] at 10:00     Future/Additional Recommendations:  Monitor oral intake, tolerance of supplements, skin status      REASON FOR ASSESSMENT  Sonya Foote is a/an 70 year old female assessed by the dietitian for Admission Nutrition Risk Screen for reduced intake over the past month and stageable pressure injuries or large/non-healing wound or burn    CLINICAL HISTORY   atherosclerotic disease, leg thrombosis, critical lower limb ischemia,s/p bilateral above knee amputations, TIA, chronic systolic heart failure, GI bleed, GERD, COPD, HTN, CAD, carotid artery stent,CVAs with resulting dysphagia, HLD, DM2, and a neurogenic bladder s/p cystoscopy w/ botulinum injection (5/21). She lives in congregate care facility with high rate of Covid is admitted for acutely worsening dyspnea, intermittent chest pain, with several days of increased cough and sputum production of white foamy sputum, without measured hypoxia, found to have focal infiltrations on CXR and bronchial air sounds on LLL.    NUTRITION HISTORY  Reports that she does not have a big appetite which has been the case for \"a while\" she reported she tends to get too much food on her tray as well, which may make intake worse due to being overwhelmed. She has poor eye site and and has issues using her hands-- she uses modified silver " "ware at home. She has noticed her pressure injury over the past couple months, bothers her intermittently. She is concerned about her nutrition, she does not want to be malnourished and is willing to try supplements.     CURRENT NUTRITION ORDERS  Diet: Regular  Intake/Tolerance:No intakes documented     LABS  Creatinine 0.51 (L)    MEDICATIONS  Protonix   Dilantin BID   Miralax, Senokot   Cholecalciferol 2000 units daily     ANTHROPOMETRICS  Height: 109.2 cm (3' 7\"), height prior to AKAs 5'7  Most Recent Weight: 57.6 kg (126 lb 14.4 oz)    IBW: ~48 kg (ajdusted 20% for bilateral AKA)   Weight History:   Wt Readings from Last 15 Encounters:   06/11/20 57.6 kg (126 lb 14.4 oz)   06/02/20 59 kg (130 lb)   05/29/20 59 kg (130 lb)   05/21/20 59 kg (130 lb)   05/14/20 59 kg (130 lb)   04/28/20 56.7 kg (125 lb)   03/12/20 56.7 kg (125 lb)   03/04/20 56.7 kg (125 lb)   01/21/20 56.2 kg (124 lb)   01/09/20 56.2 kg (124 lb)   11/19/19 58.5 kg (129 lb)   11/04/19 59 kg (130 lb)   10/28/19 59 kg (130 lb)   10/23/19 59 kg (130 lb)   08/27/19 62.1 kg (137 lb)     Dosing Weight: 50 kg (adjusted)      ASSESSED NUTRITION NEEDS  Estimated Energy Needs: 1213-0971 kcals/day (25 - 30 kcals/kg)  Justification: Maintenance  Estimated Protein Needs: 60-75 grams protein/day (1.2 - 1.5 grams of pro/kg)  Justification: Increased needs  Estimated Fluid Needs: 0641-8887 mL/day (1 mL/kcal)   Justification: Maintenance    PHYSICAL FINDINGS  Patient is currently considered a person under investigation or positive for COVID-19.    Unable to obtain in-person nutrition history or nutrition focused physical assessment (NFPA) from patient as the number of staff going into rooms is restricted to limit exposure and to minimize use of PPE.    Per Nursing, pressure injury on coccyx     MALNUTRITION  % Intake: Unable to assess  % Weight Loss: None noted  Subcutaneous Fat Loss: Unable to assess  Muscle Loss: Unable to assess  Fluid Accumulation/Edema: None " noted  Malnutrition Diagnosis: Unable to determine due to inability to complete all malnutrition parameters     NUTRITION DIAGNOSIS  Increased nutrient needs (energy/protien) related to increased metabolic demand and need for healing as evidenced by injury in coccyx     INTERVENTIONS  Implementation  Collaboration with other providers  Nutrition Education: Importance of nutrition for healing, nutrient dense foods, available supplements    Medical food supplement therapy     Goals  Patient to consume % of nutritionally adequate meal trays TID, or the equivalent with supplements/snacks.     Monitoring/Evaluation  Progress toward goals will be monitored and evaluated per protocol.    Anupama Toney RD, LD  6B pager: [phone]

## 2020-06-11 NOTE — CONSULTS
[facility] Nurse Inpatient Pressure Injury Assessment   Reason for consultation: Evaluate and treat sacrum      ASSESSMENT  Pressure Injury: on coccyx , present on admission ,   Pressure Injury is Stage 2   Contributing factor of the pressure injury: pressure and shear  Status: initial assessment      TREATMENT PLAN  Coccyx wound: Every 3 days cleanse with microKlenz and dry, apply Cavilon barrier film, then place mepilex. Turns q2hrs, keep HOB below 30 degrees as able, lift foot of bed prior to head of bed to reduce sliding. Ensure patient uses chair cushion when up to chair.    Orders Written  [facility] Nurse follow-up plan:weekly  Nursing to notify the Provider(s) and re-consult the [facility] Nurse if wound(s) deteriorates or new skin concern.    Patient History  According to provider note(s):  Sonya Foote is a 70 year old female admitted on 6/10/2020. She has a history of extensive atherosclerotic disease, chronic sinusitis, and lives in congregate care facility with high rate of Covid is admitted for acutely worsening dyspnea, intermittent chest pain, with several days of increased cough and sputum production of white foamy sputum, without measured hypoxia, found to have focal infiltrations on CXR and bronchial air sounds on LLL.    Objective Data  Containment of urine/stool: Suprapublic catheter    Current Diet/ Nutrition:  Orders Placed This Encounter      Combination Diet Regular Diet Adult      Output:   I/O last 3 completed shifts:  In: -   Out: 2200 [Urine:2200]    Risk Assessment:   Sensory Perception: 4-->no impairment  Moisture: 4-->rarely moist  Activity: 2-->chairfast  Mobility: 3-->slightly limited  Nutrition: 2-->probably inadequate  Friction and Shear: 1-->problem  Zachariah Score: 16      Labs:   Recent Labs   Lab 06/11/20  0548 06/11/20  0000   ALBUMIN  --  2.8*   HGB 11.7  --    INR 1.12 1.06   WBC 11.6*  --    CRP 33.0*  --        Physical Exam  Skin inspection: focused coccyx  Patient is high risk for pressure  injury development secondary to history of pressure injury      6/11    Wound Location:  coccyx  Date of last Photo 6/11  Wound History: present on admission, patient from [facility]  Measurements (length x width x depth, in cm) about 1 cm x 0.5 cm  x  0.1 cm   Wound Base:  100 % dermis  Tunneling N/A  Undermining N/A  Periwound skin: intact  Color: normal and consistent with surrounding tissue  Temperature: normal   Drainage:, small  Description of drainage: serous  Odor: none  Pain: mild, tender    Interventions  Current support surface: Standard  Atmos Air mattress, pulsate already ordered   Current off-loading measures: Pillows  Repositioning aid: Pillows  Visual inspection of wound(s) completed   Wound Care: was not done per plan of care.  Supplies: discussed with RN  Educated provided: importance of repositioning and plan of care  Education provided to: nurse  Discussed importance of:repositioning every 2 hours and off-loading pressure to wound  Discussed plan of care with Nurse    Tiffani Mullen RN, CWOCN

## 2020-06-11 NOTE — PROGRESS NOTES
"SPIRITUAL HEALTH SERVICES: Tele-Encounter  Patient Location ([facility], [facility], Unit): [facility], [place], [address]  Spoke with (patient, family relationship): Patient, Sonya Foote      Referral Source: Patient request for hospital  at admission.    If applicable: patient was appropriately screened for telechaplaincy support with bedside nurse prior to visit (e.g. Mental Health and Addiction contexts). See call details below.    VISIT DATA (Interventions/Outcomes):      Summary: Pt had questions about receiving the last rites and whether or not a  would be able to come. I let her know that at this time we only have a  available for emergent situations.We explored her desire to request the last rites and she acknowledged that she \"sometimes\"  feels like she is dying and that \"you never know.\" She said that sometimes she feels like it is \"time\" but her kids do not want it to be her time. Communication with pt was difficult, and the visit ended with patient saying that she is \"tired.\"    Illness Narrative: Pt described feeling \"no good today\" and \"very tired.\" Sonya also expressed being tired of being in pain.    Distress: Pt mentioned she was in pain a couple of times. I asked if the nurse is aware of this and she said yes and that she has medicine.     Coping: Sonya talked about her \"two wonderful kids\" and grandchildren.     Meaning Making: Not discussed      PLAN:  Will coordinate follow-up visit with  colleague.     Chaplain Uzma Rich    ______________________________    Type of service:  Telephone Visit/Video Visit    Call Start Time: 11:51 a.m.    Call End Time: 11:55 a.m.     has received verbal consent for a Televisit from the patient? Yes    Distance Provider Location: designated [place] office or home office (secure setting)    Mode of Communication: telephone (via [employer] phone or bookletmobile tele-call-number ([phone]))    Uzma Toddlain Intern   "

## 2020-06-11 NOTE — PLAN OF CARE
A/O X4. Able to make needs known, uses call light appropriately. HR SB/SR 50-60's, VSS; afebrile; Lung sounds clear/dim on 1L of O2 per NC with sats @ 100%, per patients comfort. good urine output per suprapubic harrell. UA sent. Tolerating regular diet. Pt bilateral AKA, mepilex on coccyx. c/o back pain. PRN oxy given w/ scheduled tylenol. Will continue to monitor and follow plan of care.

## 2020-06-11 NOTE — PLAN OF CARE
Neuro: A&Ox4. Left eye blind.   Cardiac: SR/SB. HR 50-60's. Pt complained of mild substernal chest pain earlier in the night. Afebrile.    Respiratory: Sating 100% on 1L NC. C/o of SOB and productive cough.   GI/: Adequate urine output via suprapubic catheter. No BM overnight.   Diet/appetite: Regular diet. Patient likes to take pills in applesauce d/t dysphagia from previous stroke.   Activity:  B/l AKA. Turns self independently in bed.   Pain: C/o chest, back, and head pain. Tylenol given x1.    Skin: Pressure injury on coccyx- mepi CDI.   LDA's: R PIV SL.     Plan: Echo this am. Continue with POC. Notify primary team with changes.

## 2020-06-11 NOTE — PROGRESS NOTES
"                              Internal Medicine Progress Note - [facility]  Sonya Foote MRN: [id number]  Age: 70 year old, : 1950  Date: 2020         Interval History:     No acute events this AM. Feels fatigued overall. Continues to complain of chest pressure. Worse with eating food. Cough with frothy sputum , chronic , not changed recently.     Last 24 hr care team notes reviewed.     ROS:  4 point ROS including Respiratory, CV, GI and , is negative other than above.     PHYSICAL EXAM    Vital signs:  Temp: 98.3  F (36.8  C) Temp src: Oral BP: 99/58 Pulse: 60 Heart Rate: 61 Resp: 12 SpO2: 100 % O2 Device: Nasal cannula Oxygen Delivery: 1 LPM Height: (LEONIE- Bilateral AKA) Weight: 57.6 kg (126 lb 14.4 oz)  Estimated body mass index is 48.25 kg/m  as calculated from the following:    Height as of this encounter: 1.092 m (3' 7\").    Weight as of this encounter: 57.6 kg (126 lb 14.4 oz).      Intake/Output Summary (Last 24 hours) at 2020 0802  Last data filed at 2020 0400  Gross per 24 hour   Intake 235 ml   Output 1900 ml   Net -1665 ml     GEN: NAD, resting comfortably on exam, pleasant and cooperative , AAox3  HEENT: NC/AT , pale conjunctiva, anicteric sclera, EOMI,   Neck: trachea midline, no JVD  CV: RRR, nl S1/S2 no mumurs  PULM: anterior fields CTAB,   Abdomen: soft, diffuse tender, BS +   EXTREMITIES: warm, no pedal edema  SKIN: No rashes  NEURO: MS: AAOx3 - no focal deficit   Psycho: good mood     DIAGNOSTIC STUDIES  I reviewed all medications, laboratory results, and imaging over the past 24 hours. Notable for;   ROUTINE LABS:   Cr 0.57   CRP 33.0   WBC 11.6    MICROBIOLOGY  COVID 6/10: negative     IMAGING  CT chest:  Bibasilar atelectasis and scarring  bilaterally      Assessment and Plan:     Date of admission: 6/10/2020    Sonya Foote is a 70 year old female admitted on 6/10/2020. She has a history of extensive atherosclerotic disease, chronic sinusitis, and lives in congregate " care facility with high rate of Covid is admitted for acutely worsening dyspnea, intermittent chest pain, with several days of increased cough and sputum production of white foamy sputum, without measured hypoxia, found to have focal infiltrations on CXR and bronchial air sounds on LLL.     #Plan for today:   - TTE  - Fluconazole for likely esophageal candida   - RVP  - Check UA ---> Ceftriaxone     Acute on Chronic dyspnea  Had CTA on admission with no evidence of PE or acute parenchymal disease. Has scarring at the basis of the lung bilaterally. Suspect has chronic interstitial process that needs to be worked up at some point. Most recent PFTs in 2015 with normal FEV1/FVC markedly reduced DLCO c/w interstitial process. Agree with resident no evidence of COPD at this point .     COVID was tested and was negative. This is her 5th test. Given multiple negative tests and clear CT scan will take off precautions. Low suspicion at this point.      - COVID negative   - TTE with no new changes   - No evidence of acute pulmonary process   - Check RVP     Acute, intermittent chest pressure  GERD c/b esophogeal strictures s/p dilation  Recent history of esophogeal candidiasis 4/10/2020   ACS unlikely , CTA negative for PE. CT showed esophageal thickening. Suspect candida   esophagitis. Will treat empirically and monitor for change.     Other ddx recurrent stricture of esophagus; no evidence of obstruction at this point - if pain gets worse may contact GI for repeat EGD    - Fluconazole 200 mg ---> 100 mg qday for 7-14 days     Fatigue likely 2/2 UTI   Has been complaining of diffuse fatigue. COVID-19 negative. Checking RVP as above.   - UA + for UTI will treat empirically with ceftriaxone ; await cultures   - Check TSH  - RVP pending - COVID-19 negative      Protein gap (5.3)  HIV nonreactive in 2014, HCV nonreactive 2018. With CRP 45, could be secondary to inflammatory reaction.      COPD ?   Agree with resident assessment.  PFTs in 2015 more c/w mild interstitial disease.   - hold pta nebulizer due to concern for Covid, will prescribe inhalers if concern for bronchospasm       Benign essential HTN  Coronary Artery Disease   History of CVA  - AP: aspirin 81 mg   - Statin: Atorvastatin 40 mg qday   - BB: metoprolol-xl 50 mg qday  - SCD PPX: EF >35%  - Continue PTA lisinopril     T2DM (HgbA1c 5% 12/16/19) - not on home inusulin, hold pta metformin  - BG <180 on admission, will utilize daily BMP for blood glucose monitoring     Fibromyalgia  DJD - pta oxycodone 5mg q6h, lidocaine patch, capsaicin cream, pregabalin     Mild JOSE - resume PTA CPAP at bedtime   Neurogenic bladder - chronic suprapubic catheter in place, s/p botox injections on cystoscopy 5/21  B/l AKA - utilizes prosthesis, did not bring them with her  Decubitus pressure ulcer, present on admission - WOCN consulted  Osteoporosis - pta alendronate weekly, order once day of the week confirmed  Epilepsy - pta keppra, phenytoin  Seasonal allergies, chronic sinusitis - pta loratadine, fluticasone       Diet: Low Saturated Fat Na <2400 mg    Fluids: PO intake  DVT Prophylaxis: Enoxaparin (Lovenox) SQ  Landrum Catheter: not present  Code Status: FULL CODE              - SonChris, surrogate decision maker per discussion              - Patient notes she is ready to die if her heart or lungs were to stop working, but notes that in conversation with her children they have requested she remain FULL CODE, and she has agreed to this with them.     Lines:  PIVs  Suprapubic catheter     Patient care plan discussed beside with patient, RN, and patient`s son chris.     Missy Jarrett MD  Internal Medicine Hospitalist & Staff Physician  [facility]  Pager: [phone]    Team: Paola Raymundo   Page Cross Cover between 5pm-8am: pager [phone] (Zackary), if no response then page job code [phone].

## 2020-06-11 NOTE — PROGRESS NOTES
Social Work Services Progress Note    Hospital Day: 1  Date of Initial Social Work Evaluation:  Not yet completed  Collaborated with:  [facility] (Violeta), Dr. Callum Jarrett, Chart Review    Data:  SW following for return to LTC when medically stable. Per update from Dr. Callum Jarrett, Pt will potentially be stable to return to her LTC tomorrow.     JUANCARLOS spoke with Violeta in Admissions at [facility] (Ph: [phone], F: [phone], Admissions: [phone]) and confirmed that Pt is on 18 day MA bedhold. She confirms Pt can return when stable for discharge. She does notify SW that when Pt returns, she will not go to her previous LTC room. She will be quarantined in private TCU room for 14 days before moving back to her LTC room.     SW attempted to touch base with Pt today, unfortunately she was not available at the time of SW call. Will continue to f/u.     Intervention:     -Discharge planning  -Confirmation of bedhold    Assessment:  Pt requiring IP hospitalization at this time.    Plan:    Anticipated Disposition:  Facility:  [place] [address]  Ph: [phone], F: [phone]  Admissions: [phone]    Barriers to d/c plan:  Medical stability    Follow Up:  SW to continue to follow and assist with discharge plan.    NAOMI Mo, LGSW  6[facility] Intermediate Care Unit   ANALY Alex  Phone: [phone]  Pager: [phone]

## 2020-06-11 NOTE — PROGRESS NOTES
Admission          6/10/2020 12:44 PM  -----------------------------------------------------------  Reason for admission: CP, SOB  Primary team notified of pt arrival.  Admitted from: ED  Via: stretcher  Accompanied by: Float nurse   Belongings: Placed in closet; valuables kept with patient.   Admission Profile: Complete  Teaching: orientation to unit and call light- call light within reach, call don't fall, use of console, meal times, when to call for the RN, and enforced importance of safety   Access: R PIV x1  Telemetry: Placed on pt  Ht./Wt.: Complete  2 RN Skin Assessment Completed By:  EDWARDO Meng & EDWARDO Jovel   Bed surface reassessed with algorithm and charted: yes  New bed surface ordered: yes    Pt status:    Temp:  [97.9  F (36.6  C)-99.4  F (37.4  C)] 97.9  F (36.6  C)  Pulse:  [55-69] 56  Heart Rate:  [55-73] 56  Resp:  [10-28] 14  BP: (111-169)/(57-95) 118/63  SpO2:  [90 %-100 %] 100 %

## 2020-06-11 NOTE — H&P
[facility], [place]    History and Physical - [employer] Night Float Service        Date of Admission:  6/10/2020    Assessment & Plan   Sonya Foote is a 70 year old female admitted on 6/10/2020. She has a history of extensive atherosclerotic disease, chronic sinusitis, and lives in congregate care facility with high rate of Covid is admitted for acutely worsening dyspnea, intermittent chest pain, with several days of increased cough and sputum production of white foamy sputum, without measured hypoxia, found to have focal infiltrations on CXR and bronchial air sounds on LLL.    Acute on Chronic dyspnea  Acute, intermittent chest pain  Review of chart demonstrates normal PFT and TTE, making prior diagnosis of COPD and HFrEF unlikely and less likely to be etiology of current symptoms. With high prevalence of covid at congregate living facility, high suspicion for Covid with history of dyspnea, chest pain, chills, night sweats several days ago, and possible myalgias with diffuse changes on CXR; notably not hypoxic and afebrile. Also considering pericarditis, due to indeterminate, diffuse ST changes and intermittent chest pain and dyspnea. Considered bacterial pneumonia, but unlikely with absence of hypoxia. With recent history of esophogeal candidasis, considered fungal pneumonia could be indolent presentation but unlikely acute fungal pneumonia due to lack of fever. As discussed above, considered CHF but with normal TTE in November and lack of crackles on lung exam or caphalization on CXR this seems unlikely. Without clear diagnosis of COPD, COPD exacerbation unlikely, but mild hypercapnea.  - Treat as High suspicion Covid PUI with prognostic labs, no nebulizers, precautions in place  - TTE in morning to evaluate for effusion, if present would increase suspicion of pericarditis  - Anti-inflammatory for possible pericarditis - 40mg prednisone in ED, would consider colchiceine in  AM    Protein gap (5.3)  HIV nonreactive in 2014, HCV nonreactive 2018. With CRP 45, could be secondary to inflammatory reaction.     Unclear diagnosis of COPD, with PFT in 2015 more c/w mild interstitial disease. - hold pta nebulizer due to concern for Covid, will prescribe inhalers if concern for bronchospasm      GERD c/b esophogeal strictures s/p dilation  Recent history of esophogeal candidiasis 4/10/2020 - unclear treatment for this diagnosis, stop pta nystatin swish and spit. Chronic esophogeal thickening on CT  - pta pantoprazole  - evaluation of oropharynx for thrush in AM  - consider treatment for esophageal candidiasis in AM    Benign essential HTN  Coronary Artery Disease   History of CVA  - pta lisinopril, metoprolol,     T2DM (HgbA1c 5% 12/16/19) - not on home inusulin, hold pta metformin  - BG <180 on admission, will utilize daily BMP for blood glucose monitoring    Fibromyalgia  DJD - pta oxycodone 5mg q6h, lidocaine patch, capsaicin cream, pregabalin    Mild JOSE - hold pta APAP while Covid PUI  Neurogenic bladder - chronic suprapubic catheter in place, s/p botox injections on cystoscopy 5/21  B/l AKA - utilizes prosthesis, did not bring them with her  Decubitus pressure ulcer, present on admission - WOCN consulted  Osteoporosis - pta alendronate weekly, order once day of the week confirmed  Epilepsy - pta keppra, phenytoin  Seasonal allergies, chronic sinusitis - pta loratadine, fluticasone       Diet: Low Saturated Fat Na <2400 mg    Fluids: PO intake  DVT Prophylaxis: Enoxaparin (Lovenox) SQ  Landrum Catheter: not present  Code Status: FULL CODE   - SonKam, surrogate decision maker per discussion   - Patient notes she is ready to die if her heart or lungs were to stop working, but notes that in conversation with her children they have requested she remain FULL CODE, and she has agreed to this with them.          Disposition Plan   Expected discharge: 2 - 3 days, recommended to prior living  "arrangement once O2 use less than 0 liters/minute and chest pain resolved.  Entered: Ying Ochoa MD 06/10/2020, 9:54 PM       The patient's care was discussed with the Attending Physician, Dr. Fish, Bedside Nurse and Patient.    Ying Ochoa MD  [facility] Night Float Service  [facility], [place]    Please see sticky note for contact and cross cover information  ______________________________________________________________________    Chief Complaint   Chest pain and dyspnea    History is obtained from the patient  History is obtained from [facility] staff    History of Present Illness   Sonya Foote is a 70 year old female who presents with worsening shortness of breath and intermittent chest pain that worsened today. She notes progressively worsening fatigue and dyspnea with exertion, resolved with frequent napping, since January 2020. A few nights ago she awoke drenched in sweat, denies PND or orthopnea. Denies fever or chills. Has had worsening cough with increased volume of \"white foamy\" sputum.   Had difficulty last night with body pain (unable to clarify if myalgias). She has been nauseous since yesterday. This morning she awoke feeling \"energized and ready to go with PT\" but when she got out of bed developed worsening dyspnea and substernal chest pain that is worse with breathing.  Notes chronic anosmia she attributes to chronic sinus problems, denies dysgeusia, denies worsening sinus or nasal congestion from baseline, has mild odynophagia.   Repeatedly complaining about how hot the room felt to her.     History notable for: in LTC to \"learn how to walk with new prosthetics\", She was at ENT clinic yesterday for recurrent epistaxis after covid test in facility a few days ago, no cauterization requried. She has a history of 3 heart attacks, and notes this feels distinctly different from her ACS episodes in the past.     Per nursing home:  10am " - reported SOB, chest pn, chills, faint, fatigue, weakness and extreme thirst  VS: included RR 22, Spo2 95%  MD called: advised rest and close observation, travel to hospital if symptoms persist  12pm - patient requested to go to ER for evaluation    ED course:  - Albuterol nebulizer  - Prednisone 40mg    Review of Systems    See HPI, also:  CONSTITUTIONAL:  No fevers or chills, appetite changes and weight loss  EYES: negative for icterus or vision changes  ENT:  negative for hearing loss, tinnitus, or dysphagia; POSITIVE for odynophagia  CARDIOVASCULAR:  negative for palpitations  GASTROINTESTINAL:  negative for nausea, vomiting, diarrhea and constipation  GENITOURINARY:  negative for dysuria, malodorous urine, or cloudiness of urine. Notes urine was dark in color earlier today but has been improving  HEME:  No easy bruising and gum bleeding  INTEGUMENT:  negative for rash, pruritus; Positive for pressure wound on back, chronic joint and body pain; unclear if body pain worsening  NEURO:  Negative for headache, weakness and numbness    Past Medical History    I have reviewed this patient's medical history and updated it with pertinent information if needed.   Past Medical History:   Diagnosis Date     Acid reflux disease      Amputation above knee (H)     bilateral     Benign essential hypertension      Blind left eye     due to central retinal artery occlusion     CAD (coronary artery disease)     UA and inferior MI in 2016 s/p PCI     Chronic back pain      Chronic neck pain      Chronic pain syndrome     with fentanyl intrathecal pain pump     Complex sleep apnea syndrome      COPD (chronic obstructive pulmonary disease) (H)      Dysphagia     chronic due to esophageal strictures and multiple previous dilitations      ROGER (generalized anxiety disorder)      History of blood transfusion     x5; no adverse reactions     History of central retinal artery occlusion 2006    left-sided     History of stroke      residual left-sided weakness     Hx of carotid artery stenosis     s/p left carotid stent in 2006 and balloon angioplasty in 2016     MDD (major depressive disorder)      Neurogenic bladder     SPT in place     JOSE (obstructive sleep apnea)      Osteoporosis      PAD (peripheral artery disease) (H)      Peripheral neuropathy      Person who has had sex change operation     male to female     Seizure disorder (H)     many years since last grand mal; daily, brief petit mals     Sickle cell trait (H)      Type 2 diabetes mellitus (H)         Past Surgical History   I have reviewed this patient's surgical history and updated it with pertinent information if needed.  Past Surgical History:   Procedure Laterality Date     AMPUTATE LEG ABOVE KNEE Left 6/11/2016    Procedure: AMPUTATE LEG ABOVE KNEE;  Surgeon: Mello Rodriguez MD;  [facility]     AMPUTATE LEG BELOW KNEE Right 11/7/2016    Procedure: AMPUTATE LEG BELOW KNEE;  Surgeon: Savannah Durant MD;  [facility]     AMPUTATE REVISION STUMP LOWER EXTREMITY Right 11/11/2016    Procedure: AMPUTATE REVISION STUMP LOWER EXTREMITY;  Surgeon: Savannah Durant MD;  [facility]     AMPUTATE REVISION STUMP LOWER EXTREMITY Right 11/16/2016    Procedure: AMPUTATE REVISION STUMP LOWER EXTREMITY;  Surgeon: Savannah Durant MD;  [facility]     AMPUTATE TOE(S) Right 1/5/2016    Procedure: AMPUTATE TOE(S);  Surgeon: Mello Gaines DPM;  Location:  [facility]     ANGIOGRAM Bilateral 11/21/2014    Procedure: ANGIOGRAM;  Surgeon: Savannah Durant MD;  [facility]     ANGIOGRAM Left 1/16/2015    Procedure: ANGIOGRAM;  Surgeon: Savannah Durant MD;  [facility]     ANGIOGRAM Bilateral 9/14/2015    Procedure: ANGIOGRAM;  Surgeon: Savannah Durant MD;  [facility]     ANGIOGRAM Left 10/12/2015    Procedure: ANGIOGRAM;  Surgeon: Savannah Durant MD;  [facility]     ANGIOGRAM Right 6/6/2016    Procedure: ANGIOGRAM;  Surgeon: Savannah Durant MD;  [facility]     ANGIOPLASTY  Right 6/6/2016    Procedure: ANGIOPLASTY;  Surgeon: Savannah Durant MD;  [facility]     APPENDECTOMY       BREAST BIOPSY, RT/LT Right     benign     CATARACT IOL, RT/LT Right      CHOLECYSTECTOMY       COLONOSCOPY N/A 8/25/2014    Procedure: COLONOSCOPY;  Surgeon: Mello Ferrer MD;  [facility]     COLONOSCOPY WITH CO2 INSUFFLATION N/A 8/20/2014    Procedure: COLONOSCOPY WITH CO2 INSUFFLATION;  Surgeon: Duane, William Charles, MD;  [facility]     CYSTOSCOPY, INJECT BOTOX N/A 5/21/2020    Procedure: CYSTOSCOPY, WITH BOTULINUM TOXIN INJECTION;  Surgeon: Loki Gordon MD;  [facility]     CYSTOSCOPY, INTRAVESICAL INJECTION N/A 10/31/2019    Procedure: CYSTOSCOPY, BOTOX INJECTION, Suprapubic Catheter Exchange;  Surgeon: Loki Gordon MD;  [facility]     CYSTOSTOMY, INSERT TUBE SUPRAPUBIC, COMBINED N/A 1/16/2018    Procedure: COMBINED CYSTOSTOMY, INSERT TUBE SUPRAPUBIC;  Cystoscopy, Intraoperative Ultrasound, Suprapubic Tube Placement;  Surgeon: Keanu Dawson MD;  [facility]     ENDARTERECTOMY FEMORAL  5/23/2014    Procedure: ENDARTERECTOMY FEMORAL;  Surgeon: Jason Joshi MD;  [facility]     ESOPHAGOSCOPY, GASTROSCOPY, DUODENOSCOPY (EGD), COMBINED  12/14/2012    Procedure: COMBINED ESOPHAGOSCOPY, GASTROSCOPY, DUODENOSCOPY (EGD), BIOPSY SINGLE OR MULTIPLE;  ESOPHAGOSCOPY, GASTROSCOPY, DUODENOSCOPY (EGD), DILATATION ;  Surgeon: Elizabeth Stevenson MD;  Location:  GI     ESOPHAGOSCOPY, GASTROSCOPY, DUODENOSCOPY (EGD), COMBINED  12/31/2013    Procedure: COMBINED ESOPHAGOSCOPY, GASTROSCOPY, DUODENOSCOPY (EGD), BIOPSY SINGLE OR MULTIPLE;;  Surgeon: Clemente Lopez MD;  [facility]     ESOPHAGOSCOPY, GASTROSCOPY, DUODENOSCOPY (EGD), COMBINED  4/1/2014    Procedure: COMBINED ESOPHAGOSCOPY, GASTROSCOPY, DUODENOSCOPY (EGD);;  Surgeon: Clemente Lopez MD;  [facility]     ESOPHAGOSCOPY, GASTROSCOPY, DUODENOSCOPY (EGD), COMBINED  6/28/2014     Procedure: COMBINED ESOPHAGOSCOPY, GASTROSCOPY, DUODENOSCOPY (EGD);  Surgeon: Clemente Lopez MD;  [facility]     ESOPHAGOSCOPY, GASTROSCOPY, DUODENOSCOPY (EGD), COMBINED N/A 8/20/2014    Procedure: COMBINED ESOPHAGOSCOPY, GASTROSCOPY, DUODENOSCOPY (EGD), BIOPSY SINGLE OR MULTIPLE;  Surgeon: Duane, William Charles, MD;  [facility]     ESOPHAGOSCOPY, GASTROSCOPY, DUODENOSCOPY (EGD), COMBINED N/A 8/22/2014    Procedure: COMBINED ESOPHAGOSCOPY, GASTROSCOPY, DUODENOSCOPY (EGD), BIOPSY SINGLE OR MULTIPLE;  Surgeon: Mello Ferrer MD;  [facility]     ESOPHAGOSCOPY, GASTROSCOPY, DUODENOSCOPY (EGD), COMBINED N/A 10/2/2014    Procedure: COMBINED ESOPHAGOSCOPY, GASTROSCOPY, DUODENOSCOPY (EGD), BIOPSY SINGLE OR MULTIPLE;  Surgeon: Remy Haskins MD;  [facility]     ESOPHAGOSCOPY, GASTROSCOPY, DUODENOSCOPY (EGD), COMBINED Left 12/15/2014    Procedure: COMBINED ESOPHAGOSCOPY, GASTROSCOPY, DUODENOSCOPY (EGD), BIOPSY SINGLE OR MULTIPLE;  Surgeon: Remy Haskins MD;  [facility]     ESOPHAGOSCOPY, GASTROSCOPY, DUODENOSCOPY (EGD), COMBINED N/A 2/25/2015    Procedure: COMBINED ENDOSCOPIC ULTRASOUND, ESOPHAGOSCOPY, GASTROSCOPY, DUODENOSCOPY (EGD), FINE NEEDLE ASPIRATE/BIOPSY;  Surgeon: Clemente Lugo MD;  [facility]     ESOPHAGOSCOPY, GASTROSCOPY, DUODENOSCOPY (EGD), COMBINED Left 2/25/2015    Procedure: COMBINED ESOPHAGOSCOPY, GASTROSCOPY, DUODENOSCOPY (EGD), BIOPSY SINGLE OR MULTIPLE;  Surgeon: Clemente Lugo MD;  [facility]     ESOPHAGOSCOPY, GASTROSCOPY, DUODENOSCOPY (EGD), COMBINED N/A 9/25/2016    Procedure: COMBINED ESOPHAGOSCOPY, GASTROSCOPY, DUODENOSCOPY (EGD);  Surgeon: Aziza Patiño MD;  [facility]     ESOPHAGOSCOPY, GASTROSCOPY, DUODENOSCOPY (EGD), COMBINED N/A 1/18/2017    Procedure: COMBINED ESOPHAGOSCOPY, GASTROSCOPY, DUODENOSCOPY (EGD), BIOPSY SINGLE OR MULTIPLE;  Surgeon: Clemente Lopez MD;  Location:  [facility]     ESOPHAGOSCOPY, GASTROSCOPY,  DUODENOSCOPY (EGD), COMBINED N/A 11/26/2017    Procedure: COMBINED ESOPHAGOSCOPY, GASTROSCOPY, DUODENOSCOPY (EGD), REMOVE FOREIGN BODY;  Esophagogastroduodenoscopy with foreign body extraction  ;  Surgeon: Herberth Castrejon MD;  [facility]     ESOPHAGOSCOPY, GASTROSCOPY, DUODENOSCOPY (EGD), COMBINED N/A 11/26/2017    Procedure: COMBINED ESOPHAGOSCOPY, GASTROSCOPY, DUODENOSCOPY (EGD), REMOVE FOREIGN BODY;;  Surgeon: Herberth Castrejon MD;  [facility]     ESOPHAGOSCOPY, GASTROSCOPY, DUODENOSCOPY (EGD), COMBINED N/A 4/10/2020    Procedure: ESOPHAGOGASTRODUODENOSCOPY (EGD);  Surgeon: Yael Cabral MD;  [facility]     FASCIOTOMY LOWER EXTREMITY Left 6/10/2016    Procedure: FASCIOTOMY LOWER EXTREMITY;  Surgeon: Mello Rodriguez MD;  [facility]     HC CAPSULE ENDOSCOPY N/A 8/25/2014    Procedure: CAPSULE/PILL CAM ENDOSCOPY;  Surgeon: Remy Haskins MD;  [facility]     HC CAPSULE ENDOSCOPY N/A 10/2/2014    Procedure: CAPSULE/PILL CAM ENDOSCOPY;  Surgeon: Remy Haskins MD;  [facility]     ORTHOPEDIC SURGERY      broken wrist repair     SEX TRANSFORMATION SURGERY, MALE TO FEMALE  1974 1974     SINUS SURGERY      cyst removed     TONSILLECTOMY       VASCULAR SURGERY  2006    Left carotid stent      Social History   I have personally reviewed the social history with the patient showing she currently lives at [facility], her daughter and son liver in the [facility] and are close with her. Her son, Kam, serves as her health care surrogate decision maker and she trusts him with her care decisions. She denies daily alcohol use, does use tobacco daily.     Family History   I have reviewed this patient's family history and updated it with pertinent information if needed.   Family History   Problem Relation Age of Onset     Dementia Mother      Diabetes Mother         type unknown     Coronary Artery Disease Mother         MI     Glaucoma Father      Diabetes Father          type unknown     Heart Failure Father      Schizophrenia Brother      Depression Brother      Suicide Sister      Depression Sister      Diabetes Sister      Ovarian Cancer Maternal Aunt      Leukemia Maternal Aunt      Diabetes Maternal Grandmother      Diabetes Maternal Grandfather      Diabetes Paternal Grandmother      Diabetes Paternal Grandfather      Breast Cancer Sister      Cerebrovascular Disease Brother      Colon Cancer No family hx of      Macular Degeneration No family hx of      Prior to Admission Medications   Prior to Admission Medications   Prescriptions Last Dose Informant Patient Reported? Taking?   ACETAMINOPHEN PO  Nursing Home Yes No   Sig: Take 1,000 mg by mouth every 6 hours as needed for pain Not to exceed 4000 mg/day   ADVAIR DISKUS 250-50 MCG/DOSE diskus inhaler  Nursing Home No No   Sig: Inhale 1 puff into the lungs 2 times daily   Cholecalciferol (VITAMIN D) 2000 UNITS tablet  Nursing Home Yes No   Sig: Take 2,000 Units by mouth daily.   Lidocaine (LIDOCARE) 4 % Patch   No No   Sig: Place 1 patch onto the skin every 24 hours To prevent lidocaine toxicity, patient should be patch free for 12 hrs daily.   Nutritional Supplements (ENSURE NUTRITION SHAKE) LIQD   No No   Sig: Take 1 Bottle by mouth 2 times daily With meals   VENTOLIN  (90 Base) MCG/ACT inhaler   No No   Sig: Inhale 2 puffs into the lungs every 6 hours as needed for shortness of breath / dyspnea or wheezing   albuterol (PROVENTIL) (2.5 MG/3ML) 0.083% neb solution   No No   Sig: INHALE 1 VIAL VIA NEBULIZER EVERY 6 HOURS AS NEEDED   alendronate (FOSAMAX) 70 MG tablet   No No   Sig: Take 1 tablet (70 mg) by mouth with 8oz water every 7 days 30 minutes before breakfast and remain upright during this time.   aspirin EC 81 MG EC tablet  Nursing Home No No   Sig: Take 1 tablet (81 mg) by mouth daily   atorvastatin (LIPITOR) 40 MG tablet   No No   Sig: TAKE 1 TAB BY MOUTH ONCE DAILY   blood glucose monitoring (ONE TOUCH  ULTRA 2) meter device kit  Nursing Home No No   Sig: Use to test blood sugars 3 times daily or as directed.   blood glucose monitoring (ONE TOUCH ULTRA) test strip  Nursing Home No No   Sig: Use to test blood sugars 3 times daily or as directed.   blood glucose monitoring (ONE TOUCH ULTRASOFT) lancets  Nursing Home No No   Sig: Use to test blood sugar 3 times daily or as directed.   brimonidine (ALPHAGAN) 0.2 % ophthalmic solution  Nursing Home No No   Sig: Place 1 drop into the right eye 2 times daily   capsaicin-menthol-methyl sal 0.025-1-12 % external cream   No No   Sig: Apply 1 Application topically daily as needed (topical pain)   clotrimazole (LOTRIMIN) 1 % cream   No No   Sig: INSERT 1 APPLICATORFUL VAGINALLY TWICE A DAY FOR VAGINAL PAIN   clotrimazole (LOTRIMIN) 1 % external cream   No No   Sig: Apply topically 2 times daily   diclofenac (VOLTAREN) 1 % GEL topical gel   No No   Sig: Apply 2 g topically 4 times daily to hands   dorzolamide (TRUSOPT) 2 % ophthalmic solution  Nursing Home No No   Sig: Place 1 drop into the right eye 2 times daily   escitalopram (LEXAPRO) 10 MG tablet   No No   Sig: TAKE 1 TAB BY MOUTH ONCE DAILY   ferrous sulfate (IRON) 325 (65 FE) MG tablet  Nursing Home No No   Sig: Take 1 tablet (325 mg) by mouth 2 times daily With meals   fluticasone (FLONASE) 50 MCG/ACT nasal spray   No No   Sig: Spray 1 spray into both nostrils daily   lactulose (CHRONULAC) 10 GM/15ML solution   No No   Sig: Take 30 mLs (20 g) by mouth as needed for constipation   latanoprost (XALATAN) 0.005 % ophthalmic solution  Nursing Home No No   Sig: Place 1 drop into both eyes At Bedtime   levETIRAcetam (KEPPRA) 500 MG tablet   No No   Sig: TAKE 1 TAB BY MOUTH TWICE A DAY   lisinopril (PRINIVIL/ZESTRIL) 20 MG tablet   No No   Sig: TAKE 1 TAB BY MOUTH ONCE DAILY   loratadine (CLARITIN) 10 MG tablet   No No   Sig: Take 1 tablet (10 mg) by mouth daily   metFORMIN (GLUCOPHAGE) 500 MG tablet   No No   Sig: TAKE 1 TAB  BY MOUTH IN THE EVENING WITH DINNER   metoprolol succinate ER (TOPROL-XL) 50 MG 24 hr tablet   No No   Sig: TAKE 1 TAB BY MOUTH ONCE DAILY   nicotine (NICODERM CQ) 14 MG/24HR 24 hr patch   No No   Sig: Place 1 patch onto the skin every 24 hours   nitroGLYcerin (NITROSTAT) 0.4 MG sublingual tablet   No No   Sig: DISSOLVE ONE TABLET UNDER TONGUE AS NEEDED FOR CHEST PAIN MAY REPEAT EVERY 5 MINUTES FOR 3 DOSES, IF SYMPTOMS PERSIST CALL 911   nystatin (MYCOSTATIN) 539063 UNIT/ML suspension   No No   Sig: Take 5 mLs (500,000 Units) by mouth 4 times daily - swish and spit for 7 days   oxyCODONE (ROXICODONE) 5 MG tablet   Yes No   Sig: TAKE 1 TABLET BY MOUTH EVERY 6 HOURS AS NEEDED   pantoprazole (PROTONIX) 40 MG EC tablet   No No   Sig: TAKE 1 TAB BY MOUTH ONCE DAILY   phenytoin (DILANTIN) 100 MG capsule   No No   Sig: TAKE 2 CAPS (200MG) BY MOUTH TWICE A DAY   polyethylene glycol (MIRALAX/GLYCOLAX) Packet  Nursing Home Yes No   Sig: Take 17 g by mouth daily Dissolved in water or juice    pregabalin (LYRICA) 150 MG capsule   Yes No   pregabalin (LYRICA) 75 MG capsule   Yes No   Sig: Take 150 mg by mouth 3 times daily   risperiDONE (RISPERDAL) 0.5 MG tablet   No No   Sig: TAKE 1 TAB BY MOUTH AT BEDTIME   sennosides (SENOKOT) 8.6 MG tablet   Yes No   Sig: Take 1 tablet by mouth 2 times daily    solifenacin (VESICARE) 10 MG tablet   No No   Sig: TAKE 1 TAB BY MOUTH ONCE DAILY   sucralfate (CARAFATE) 1 GM tablet   No No   Sig: TAKE 1 TAB BY MOUTH FOUR TIMES DAILY. MAY DISSOLVE IN 10ML WATER ISNECESSARY   traZODone (DESYREL) 150 MG tablet   No No   Sig: TAKE 1 TAB BY MOUTH AT BEDTIME   umeclidinium (INCRUSE ELLIPTA) 62.5 MCG/INH inhaler   No No   Sig: Inhale 1 puff into the lungs daily      Facility-Administered Medications: None     Allergies   Allergies   Allergen Reactions     Bee Venom Anaphylaxis     Penicillins Anaphylaxis     Dilantin [Phenytoin] Other (See Comments)     Generic dilantin only per pt     Iodine Hives      Novocaine [Procaine] Hives     Tositumomab Unknown       Physical Exam   Vital Signs: Temp: 99.4  F (37.4  C) Temp src: Oral BP: 128/64 Pulse: 55 Heart Rate: 59 Resp: 14 SpO2: 100 % O2 Device: Nasal cannula Oxygen Delivery: 3 LPM  Weight: 130 lbs 0 oz    GENERAL: Lying in bed, mild distress, diaphoretic with skin cool to the touch  HEENT: head atraumatic, face symmetric, anicteric, wearing glasses, moist mucous membranes, speech logical and coherent  CV: Regular rate and rhythm, no murmurs, capillary refill <2 seconds, no peripheral edema  RESPIRATORY: Breathing comfortably on 2L O2, When O2 turned down, SpO2 >98% but chest pain worsened, decreased air movement throughout lung fields with diffuse end expiratory wheeze and positive egophony at LLL field.  GI/: non distended, normal bowel sounds, soft abdomen without tenderness, harrell bag with light yellow urine without cloudiness, suprapubic catheter in place with dry dressing in place.   MSK/INTEGUMENT: well healed surgical scars on R wrist and inner R upper arm, Decubitus ulcer with foam dressing, exam defer at this time as nursing recently reviewed and identified pea size skin opening without exudate and non-blanching erythema.  NEURO: speech logical, alert and oriented, moving all extremities, CN III - XII intact  PSYCH: mood congruent      Data   Data reviewed today: I reviewed all medications, new labs and imaging results over the last 24 hours. I personally reviewed the EKG tracing showing diffuse ST changes <1mm and the chest x-ray image(s) showing LLL focal infiltration with hazy opacification throghout lung fields.    Of note:   WBC 10.5, ALC 1.7  Cr 0.54  Protein 8.2, Albumin 2.9  CRP 46  BNP 31 (no BNP in our system for comparison)  D dimer 0.6  VBG 7.34/58/32/31    CT PE 6/10 - without sign of PE, esophageal thickening    TTE 11/13/2019:  Global and regional left ventricular function is normal with an EF of 60-65%.  Right ventricular function, chamber  size, wall motion, and thickness are  normal.  Pulmonary artery systolic pressure is normal.  Sinuses of Valsalva 3.1 cm.  The inferior vena cava is normal.  No pericardial effusion is present.

## 2020-06-12 VITALS
RESPIRATION RATE: 20 BRPM | HEART RATE: 64 BPM | BODY MASS INDEX: 29.71 KG/M2 | WEIGHT: 128.4 LBS | SYSTOLIC BLOOD PRESSURE: 97 MMHG | DIASTOLIC BLOOD PRESSURE: 54 MMHG | OXYGEN SATURATION: 100 % | TEMPERATURE: 99.4 F | HEIGHT: 55 IN

## 2020-06-12 LAB
ANION GAP SERPL CALCULATED.3IONS-SCNC: 6 MMOL/L (ref 3–14)
BASOPHILS # BLD AUTO: 0.1 10E9/L (ref 0–0.2)
BASOPHILS NFR BLD AUTO: 0.5 %
BUN SERPL-MCNC: 13 MG/DL (ref 7–30)
C PNEUM DNA SPEC QL NAA+PROBE: NOT DETECTED
CALCIUM SERPL-MCNC: 8.5 MG/DL (ref 8.5–10.1)
CHLORIDE SERPL-SCNC: 108 MMOL/L (ref 94–109)
CO2 SERPL-SCNC: 27 MMOL/L (ref 20–32)
CREAT SERPL-MCNC: 0.49 MG/DL (ref 0.52–1.04)
CRP SERPL-MCNC: 24 MG/L (ref 0–8)
DIFFERENTIAL METHOD BLD: ABNORMAL
EOSINOPHIL # BLD AUTO: 0.2 10E9/L (ref 0–0.7)
EOSINOPHIL NFR BLD AUTO: 2.3 %
ERYTHROCYTE [DISTWIDTH] IN BLOOD BY AUTOMATED COUNT: 13.5 % (ref 10–15)
FLUAV H1 2009 PAND RNA SPEC QL NAA+PROBE: NOT DETECTED
FLUAV H1 RNA SPEC QL NAA+PROBE: NOT DETECTED
FLUAV H3 RNA SPEC QL NAA+PROBE: NOT DETECTED
FLUAV RNA SPEC QL NAA+PROBE: NOT DETECTED
FLUBV RNA SPEC QL NAA+PROBE: NOT DETECTED
GFR SERPL CREATININE-BSD FRML MDRD: >90 ML/MIN/{1.73_M2}
GLUCOSE BLDC GLUCOMTR-MCNC: 101 MG/DL (ref 70–99)
GLUCOSE SERPL-MCNC: 91 MG/DL (ref 70–99)
HADV DNA SPEC QL NAA+PROBE: NOT DETECTED
HCOV PNL SPEC NAA+PROBE: NOT DETECTED
HCT VFR BLD AUTO: 34 % (ref 35–47)
HGB BLD-MCNC: 11.2 G/DL (ref 11.7–15.7)
HMPV RNA SPEC QL NAA+PROBE: NOT DETECTED
HPIV1 RNA SPEC QL NAA+PROBE: NOT DETECTED
HPIV2 RNA SPEC QL NAA+PROBE: NOT DETECTED
HPIV3 RNA SPEC QL NAA+PROBE: NOT DETECTED
HPIV4 RNA SPEC QL NAA+PROBE: NOT DETECTED
IMM GRANULOCYTES # BLD: 0 10E9/L (ref 0–0.4)
IMM GRANULOCYTES NFR BLD: 0.3 %
LYMPHOCYTES # BLD AUTO: 2 10E9/L (ref 0.8–5.3)
LYMPHOCYTES NFR BLD AUTO: 20.3 %
M PNEUMO DNA SPEC QL NAA+PROBE: NOT DETECTED
MCH RBC QN AUTO: 29.2 PG (ref 26.5–33)
MCHC RBC AUTO-ENTMCNC: 32.9 G/DL (ref 31.5–36.5)
MCV RBC AUTO: 89 FL (ref 78–100)
MICROBIOLOGIST REVIEW: NORMAL
MONOCYTES # BLD AUTO: 1.1 10E9/L (ref 0–1.3)
MONOCYTES NFR BLD AUTO: 11.2 %
NEUTROPHILS # BLD AUTO: 6.3 10E9/L (ref 1.6–8.3)
NEUTROPHILS NFR BLD AUTO: 65.4 %
NRBC # BLD AUTO: 0 10*3/UL
NRBC BLD AUTO-RTO: 0 /100
PLATELET # BLD AUTO: 253 10E9/L (ref 150–450)
POTASSIUM SERPL-SCNC: 3.8 MMOL/L (ref 3.4–5.3)
RBC # BLD AUTO: 3.84 10E12/L (ref 3.8–5.2)
RETICS # AUTO: 53.8 10E9/L (ref 25–95)
RETICS/RBC NFR AUTO: 1.4 % (ref 0.5–2)
RSV RNA SPEC QL NAA+PROBE: NOT DETECTED
RSV RNA SPEC QL NAA+PROBE: NOT DETECTED
RV+EV RNA SPEC QL NAA+PROBE: NOT DETECTED
SODIUM SERPL-SCNC: 141 MMOL/L (ref 133–144)
TROPONIN I SERPL-MCNC: <0.015 UG/L (ref 0–0.04)
TROPONIN I SERPL-MCNC: <0.015 UG/L (ref 0–0.04)
WBC # BLD AUTO: 9.7 10E9/L (ref 4–11)

## 2020-06-12 PROCEDURE — 86140 C-REACTIVE PROTEIN: CPT | Performed by: STUDENT IN AN ORGANIZED HEALTH CARE EDUCATION/TRAINING PROGRAM

## 2020-06-12 PROCEDURE — 99239 HOSP IP/OBS DSCHRG MGMT >30: CPT | Performed by: STUDENT IN AN ORGANIZED HEALTH CARE EDUCATION/TRAINING PROGRAM

## 2020-06-12 PROCEDURE — 87186 SC STD MICRODIL/AGAR DIL: CPT | Performed by: STUDENT IN AN ORGANIZED HEALTH CARE EDUCATION/TRAINING PROGRAM

## 2020-06-12 PROCEDURE — 00000146 ZZHCL STATISTIC GLUCOSE BY METER IP

## 2020-06-12 PROCEDURE — 25000132 ZZH RX MED GY IP 250 OP 250 PS 637: Performed by: STUDENT IN AN ORGANIZED HEALTH CARE EDUCATION/TRAINING PROGRAM

## 2020-06-12 PROCEDURE — 85025 COMPLETE CBC W/AUTO DIFF WBC: CPT | Performed by: STUDENT IN AN ORGANIZED HEALTH CARE EDUCATION/TRAINING PROGRAM

## 2020-06-12 PROCEDURE — 25000128 H RX IP 250 OP 636: Performed by: STUDENT IN AN ORGANIZED HEALTH CARE EDUCATION/TRAINING PROGRAM

## 2020-06-12 PROCEDURE — 84484 ASSAY OF TROPONIN QUANT: CPT | Performed by: STUDENT IN AN ORGANIZED HEALTH CARE EDUCATION/TRAINING PROGRAM

## 2020-06-12 PROCEDURE — 80048 BASIC METABOLIC PNL TOTAL CA: CPT | Performed by: STUDENT IN AN ORGANIZED HEALTH CARE EDUCATION/TRAINING PROGRAM

## 2020-06-12 PROCEDURE — 87088 URINE BACTERIA CULTURE: CPT | Performed by: STUDENT IN AN ORGANIZED HEALTH CARE EDUCATION/TRAINING PROGRAM

## 2020-06-12 PROCEDURE — 40000894 ZZH STATISTIC OT IP EVAL DEFER: Performed by: OCCUPATIONAL THERAPIST

## 2020-06-12 PROCEDURE — 85045 AUTOMATED RETICULOCYTE COUNT: CPT | Performed by: STUDENT IN AN ORGANIZED HEALTH CARE EDUCATION/TRAINING PROGRAM

## 2020-06-12 PROCEDURE — 87086 URINE CULTURE/COLONY COUNT: CPT | Performed by: STUDENT IN AN ORGANIZED HEALTH CARE EDUCATION/TRAINING PROGRAM

## 2020-06-12 PROCEDURE — 36415 COLL VENOUS BLD VENIPUNCTURE: CPT | Performed by: STUDENT IN AN ORGANIZED HEALTH CARE EDUCATION/TRAINING PROGRAM

## 2020-06-12 RX ORDER — MULTIVITAMIN,THERAPEUTIC
1 TABLET ORAL DAILY
DISCHARGE
Start: 2020-06-12 | End: 2021-07-20

## 2020-06-12 RX ORDER — ASCORBIC ACID 250 MG
500 TABLET,CHEWABLE ORAL DAILY
Status: DISCONTINUED | OUTPATIENT
Start: 2020-06-12 | End: 2020-06-12 | Stop reason: HOSPADM

## 2020-06-12 RX ORDER — ZINC SULFATE 50(220)MG
220 CAPSULE ORAL DAILY
Status: DISCONTINUED | OUTPATIENT
Start: 2020-06-12 | End: 2020-06-12 | Stop reason: HOSPADM

## 2020-06-12 RX ORDER — CHOLECALCIFEROL (VITAMIN D3) 125 MCG
20000 CAPSULE ORAL DAILY
Status: DISCONTINUED | OUTPATIENT
Start: 2020-06-12 | End: 2020-06-12 | Stop reason: HOSPADM

## 2020-06-12 RX ORDER — GRANULES FOR ORAL 3 G/1
3 POWDER ORAL ONCE
Status: DISCONTINUED | OUTPATIENT
Start: 2020-06-12 | End: 2020-06-12

## 2020-06-12 RX ORDER — ZINC SULFATE 50(220)MG
220 CAPSULE ORAL DAILY
DISCHARGE
Start: 2020-06-13 | End: 2020-06-23

## 2020-06-12 RX ORDER — AMOXICILLIN 250 MG
1 CAPSULE ORAL 2 TIMES DAILY
DISCHARGE
Start: 2020-06-12 | End: 2020-07-30

## 2020-06-12 RX ORDER — CHOLECALCIFEROL (VITAMIN D3) 125 MCG
20000 CAPSULE ORAL DAILY
DISCHARGE
Start: 2020-06-13 | End: 2020-06-23

## 2020-06-12 RX ORDER — FLUCONAZOLE 100 MG/1
100 TABLET ORAL DAILY
Qty: 7 TABLET | Refills: 0 | DISCHARGE
Start: 2020-06-13 | End: 2020-06-23

## 2020-06-12 RX ORDER — MULTIVITAMIN,THERAPEUTIC
1 TABLET ORAL DAILY
Status: DISCONTINUED | OUTPATIENT
Start: 2020-06-12 | End: 2020-06-12 | Stop reason: HOSPADM

## 2020-06-12 RX ORDER — CIPROFLOXACIN 250 MG/1
250 TABLET, FILM COATED ORAL
Status: DISCONTINUED | OUTPATIENT
Start: 2020-06-12 | End: 2020-06-12 | Stop reason: HOSPADM

## 2020-06-12 RX ORDER — CIPROFLOXACIN 250 MG/1
250 TABLET, FILM COATED ORAL EVERY 24 HOURS
Qty: 4 TABLET | Refills: 0 | DISCHARGE
Start: 2020-06-13 | End: 2020-06-23

## 2020-06-12 RX ADMIN — LIDOCAINE 1 PATCH: 560 PATCH PERCUTANEOUS; TOPICAL; TRANSDERMAL at 08:07

## 2020-06-12 RX ADMIN — DICLOFENAC 2 G: 10 GEL TOPICAL at 08:09

## 2020-06-12 RX ADMIN — BRIMONIDINE TARTRATE 1 DROP: 2 SOLUTION/ DROPS OPHTHALMIC at 08:08

## 2020-06-12 RX ADMIN — METOPROLOL SUCCINATE 50 MG: 50 TABLET, EXTENDED RELEASE ORAL at 08:07

## 2020-06-12 RX ADMIN — DICLOFENAC 2 G: 10 GEL TOPICAL at 11:57

## 2020-06-12 RX ADMIN — CIPROFLOXACIN HYDROCHLORIDE 250 MG: 250 TABLET, FILM COATED ORAL at 10:43

## 2020-06-12 RX ADMIN — ESCITALOPRAM OXALATE 10 MG: 10 TABLET ORAL at 08:07

## 2020-06-12 RX ADMIN — ZINC SULFATE 220 MG (50 MG) CAPSULE 220 MG: CAPSULE at 10:44

## 2020-06-12 RX ADMIN — LORATADINE 10 MG: 10 TABLET ORAL at 08:07

## 2020-06-12 RX ADMIN — DORZOLAMIDE HYDROCHLORIDE 1 DROP: 20 SOLUTION/ DROPS OPHTHALMIC at 08:08

## 2020-06-12 RX ADMIN — PREGABALIN 150 MG: 50 CAPSULE ORAL at 08:06

## 2020-06-12 RX ADMIN — POLYETHYLENE GLYCOL 3350 17 G: 17 POWDER, FOR SOLUTION ORAL at 08:06

## 2020-06-12 RX ADMIN — MELATONIN 2000 UNITS: at 08:07

## 2020-06-12 RX ADMIN — ATORVASTATIN CALCIUM 40 MG: 40 TABLET, FILM COATED ORAL at 08:07

## 2020-06-12 RX ADMIN — PREGABALIN 150 MG: 50 CAPSULE ORAL at 13:16

## 2020-06-12 RX ADMIN — Medication 500 MG: at 10:49

## 2020-06-12 RX ADMIN — THERA TABS 1 TABLET: TAB at 10:44

## 2020-06-12 RX ADMIN — ENOXAPARIN SODIUM 40 MG: 40 INJECTION SUBCUTANEOUS at 08:06

## 2020-06-12 RX ADMIN — OXYCODONE HYDROCHLORIDE 5 MG: 5 TABLET ORAL at 13:49

## 2020-06-12 RX ADMIN — SENNOSIDES AND DOCUSATE SODIUM 2 TABLET: 8.6; 5 TABLET ORAL at 08:06

## 2020-06-12 RX ADMIN — PANTOPRAZOLE SODIUM 40 MG: 40 TABLET, DELAYED RELEASE ORAL at 08:07

## 2020-06-12 RX ADMIN — BISACODYL 10 MG: 10 SUPPOSITORY RECTAL at 08:07

## 2020-06-12 RX ADMIN — EXTENDED PHENYTOIN SODIUM 200 MG: 100 CAPSULE ORAL at 08:06

## 2020-06-12 RX ADMIN — FLUCONAZOLE 100 MG: 100 TABLET ORAL at 08:06

## 2020-06-12 RX ADMIN — OXYCODONE HYDROCHLORIDE 5 MG: 5 TABLET ORAL at 05:40

## 2020-06-12 RX ADMIN — FLUTICASONE PROPIONATE 1 SPRAY: 50 SPRAY, METERED NASAL at 08:08

## 2020-06-12 RX ADMIN — Medication 20000 UNITS: at 10:43

## 2020-06-12 RX ADMIN — ASPIRIN 81 MG: 81 TABLET, COATED ORAL at 08:06

## 2020-06-12 RX ADMIN — LEVETIRACETAM 500 MG: 500 TABLET ORAL at 08:07

## 2020-06-12 RX ADMIN — NICOTINE 1 PATCH: 14 PATCH, EXTENDED RELEASE TRANSDERMAL at 08:08

## 2020-06-12 ASSESSMENT — ACTIVITIES OF DAILY LIVING (ADL)
ADLS_ACUITY_SCORE: 24

## 2020-06-12 ASSESSMENT — MIFFLIN-ST. JEOR: SCORE: 749.05

## 2020-06-12 NOTE — PROGRESS NOTES
Social Work Services Discharge Note      Patient Name:  Sonya Foote     Anticipated Discharge Date:  6/12/2020    Discharge Disposition:   [facility]   (Ph: [phone], F: [phone], Admissions: [phone])    Following MD:  Per facility      Pre-Admission Screening (PAS) online form has been completed.  The Level of Care (LOC) is:  Determined  Confirmation Code is:  Returning to LTC  Patient/caregiver informed of referral to [facility] Line for Pre-Admission Screening for skilled nursing facility (SNF) placement and to expect a phone call post discharge from SNF.     Additional Services/Equipment Arranged:      SW received a call from Cas Cardenas CM requesting an update. SW provided brief update and indicated that pt will likely return to LTC today. SW indicated that she will call if pt does not discharge today. She requested discharge information faxed to [phone] (phone and fax).     [facility] (Ph: [phone]) w/c ride scheduled for 1400. Pt informed of potential cost for W/C ride. Pt agreeable.     SW informed pt of 14 day quarantine at TCU prior to returning to her LTC room. Pt voiced understanding.       SW was informed that pt would like to speak with SW prior to discharge. SW called 3x and pt did not answer.     Patient / Family response to discharge plan:  Pt agreeable to discharge back to LTC      Persons notified of above discharge plan:  Pt via phone, bedside nurse, charge nurse, LTC and primary team     Staff Discharge Instructions:  Faxed discharge orders and signed hard scripts for any controlled substances.  Please print a packet and send with patient.   Please call for nurse to nurse [phone]    CTS Handoff completed:  YES    Medicare Notice of Rights provided to the patient/family:  YES    NAOMI Cuenca, [facility] CenterSW  Acute Care Float   [facility]  Pager: [phone]

## 2020-06-12 NOTE — DISCHARGE SUMMARY
Discharge Summary    Sonya Foote MRN# [id number]   YOB: 1950 Age: 70 year old     Date of Admission:  6/10/2020  Date of Discharge:  6/12/2020  2:11 PM  Admitting Physician:  Kirk Fish MD  Discharge Physician:  Missy Jarrett MD  Discharging Service:  Internal Medicine     Primary Provider: Allan Casey          Outpatient Providers To Do:   Follow up urine cultures and evaluate if treatment adjustments needed     Coccyx wound: Every 3 days cleanse with microKlenz and dry, apply Cavilon barrier film, then place mepilex. Turns q2hrs, keep HOB below 30 degrees as able, lift foot of bed prior to head of bed to reduce sliding. Ensure patient uses chair cushion when up to chair    Follow up with pulmonary medicine          Discharge Diagnosis:     Active Problems:    Chest pain on breathing  Complicated UTI   Acute on Chronic dyspnea  Acute, intermittent chest pressure  GERD c/b esophogeal strictures s/p dilation  Recent history of esophogeal candidiasis 4/10/2020   Benign essential HTN  Coronary Artery Disease   History of CVAi  T2DM (HgbA1c 5% 12/16/19)  Fibromyalgia  DJD  Mild JOSE  Neurogenic bladder  B/l AKA  Decubitus pressure ulcer, present on admission  Osteoporosis   Epilepsy   Seasonal allergies, chronic sinusitis              Discharge Disposition:     Discharged to home           Condition on Discharge:     Discharge condition: Stable   Code status on discharge: Full Code           Procedures:   No procedures performed during this admission          Discharge Medications:     Current Discharge Medication List      CONTINUE these medications which have NOT CHANGED    Details   ADVAIR DISKUS 250-50 MCG/DOSE diskus inhaler Inhale 1 puff into the lungs 2 times daily  Qty: 60 Inhaler, Refills: 11    Associated Diagnoses: Acute bronchitis      alendronate (FOSAMAX) 70 MG tablet Take 1 tablet (70 mg) by mouth with 8oz water every 7 days 30 minutes before breakfast and remain  upright during this time.  Qty: 12 tablet, Refills: 3    Associated Diagnoses: Age-related osteoporosis without current pathological fracture      aspirin EC 81 MG EC tablet Take 1 tablet (81 mg) by mouth daily  Qty: 90 tablet, Refills: 3    Associated Diagnoses: Unstable angina (H)      atorvastatin (LIPITOR) 40 MG tablet TAKE 1 TAB BY MOUTH ONCE DAILY  Qty: 30 tablet, Refills: 11    Associated Diagnoses: Hyperlipidemia LDL goal <100      brimonidine (ALPHAGAN) 0.2 % ophthalmic solution Place 1 drop into the right eye 2 times daily  Qty: 1 Bottle, Refills: 11    Associated Diagnoses: Primary open angle glaucoma of both eyes, severe stage      capsaicin-menthol-methyl sal 0.025-1-12 % external cream Apply 1 Application topically daily as needed (topical pain)  Qty: 56.6 g, Refills: 1    Associated Diagnoses: Chronic pain syndrome      diclofenac (VOLTAREN) 1 % GEL topical gel Apply 2 g topically 4 times daily to hands  Qty: 100 g, Refills: 11    Associated Diagnoses: Primary osteoarthritis of both hands      dorzolamide (TRUSOPT) 2 % ophthalmic solution Place 1 drop into the right eye 2 times daily  Qty: 1 Bottle, Refills: 11    Associated Diagnoses: Primary open angle glaucoma of both eyes, severe stage      escitalopram (LEXAPRO) 10 MG tablet TAKE 1 TAB BY MOUTH ONCE DAILY  Qty: 30 tablet, Refills: 11    Associated Diagnoses: Schizoaffective disorder, unspecified type (H)      ferrous sulfate (IRON) 325 (65 FE) MG tablet Take 1 tablet (325 mg) by mouth 2 times daily With meals  Qty: 60 tablet, Refills: 2      levETIRAcetam (KEPPRA) 500 MG tablet TAKE 1 TAB BY MOUTH TWICE A DAY  Qty: 60 tablet, Refills: 5    Associated Diagnoses: Nausea      Lidocaine (LIDOCARE) 4 % Patch Place 1 patch onto the skin every 24 hours To prevent lidocaine toxicity, patient should be patch free for 12 hrs daily.    Associated Diagnoses: Low back pain, unspecified back pain laterality, unspecified chronicity, unspecified whether sciatica  present; Chronic pain syndrome      lisinopril (PRINIVIL/ZESTRIL) 20 MG tablet TAKE 1 TAB BY MOUTH ONCE DAILY  Qty: 30 tablet, Refills: 11    Associated Diagnoses: History of coronary artery disease      loratadine (CLARITIN) 10 MG tablet Take 1 tablet (10 mg) by mouth daily  Qty: 30 tablet, Refills: 1    Associated Diagnoses: Allergic rhinitis, unspecified seasonality, unspecified trigger      metoprolol succinate ER (TOPROL-XL) 50 MG 24 hr tablet TAKE 1 TAB BY MOUTH ONCE DAILY  Qty: 30 tablet, Refills: 11    Associated Diagnoses: History of coronary artery disease      mupirocin (BACTROBAN) 2 % external ointment Apply topically daily Apply to Nares      pantoprazole (PROTONIX) 40 MG EC tablet TAKE 1 TAB BY MOUTH ONCE DAILY  Qty: 30 tablet, Refills: 11    Associated Diagnoses: Gastroesophageal reflux disease without esophagitis      phenytoin (DILANTIN) 100 MG capsule TAKE 2 CAPS (200MG) BY MOUTH TWICE A DAY  Qty: 120 capsule, Refills: 11    Associated Diagnoses: Seizure disorder (H)      polyethylene glycol (MIRALAX/GLYCOLAX) Packet Take 17 g by mouth daily Dissolved in water or juice       pregabalin (LYRICA) 150 MG capsule Take 150 mg by mouth 3 times daily       sennosides (SENOKOT) 8.6 MG tablet Take 1 tablet by mouth 2 times daily       solifenacin (VESICARE) 10 MG tablet TAKE 1 TAB BY MOUTH ONCE DAILY  Qty: 30 tablet, Refills: 11    Associated Diagnoses: Urinary frequency      sulfamethoxazole-trimethoprim (BACTRIM DS) 800-160 MG tablet Take 1 tablet by mouth 2 times daily      umeclidinium (INCRUSE ELLIPTA) 62.5 MCG/INH inhaler Inhale 1 puff into the lungs daily  Qty: 1 Inhaler, Refills: 6    Associated Diagnoses: Chronic obstructive pulmonary disease, unspecified COPD type (H)      vitamin D3 (CHOLECALCIFEROL) 10 MCG (400 UNIT) capsule Take 2 capsules by mouth daily      albuterol (PROVENTIL) (2.5 MG/3ML) 0.083% neb solution INHALE 1 VIAL VIA NEBULIZER EVERY 6 HOURS AS NEEDED  Qty: 360 mL, Refills: 11     Associated Diagnoses: Chronic obstructive pulmonary disease, unspecified COPD type (H)      blood glucose monitoring (ONE TOUCH ULTRA 2) meter device kit Use to test blood sugars 3 times daily or as directed.  Qty: 1 kit, Refills: 0    Associated Diagnoses: Type 2 diabetes, HbA1C goal < 8% (H)      blood glucose monitoring (ONE TOUCH ULTRA) test strip Use to test blood sugars 3 times daily or as directed.  Qty: 3 Box, Refills: 3    Associated Diagnoses: Type 2 diabetes mellitus with diabetic peripheral angiopathy without gangrene, without long-term current use of insulin (H)      blood glucose monitoring (ONE TOUCH ULTRASOFT) lancets Use to test blood sugar 3 times daily or as directed.  Qty: 100 each, Refills: PRN    Associated Diagnoses: Type 2 diabetes mellitus with diabetic peripheral angiopathy without gangrene, without long-term current use of insulin (H)      lactulose (CHRONULAC) 10 GM/15ML solution Take 30 mLs (20 g) by mouth as needed for constipation  Qty: 236 mL, Refills: 0    Associated Diagnoses: Constipation, unspecified constipation type      latanoprost (XALATAN) 0.005 % ophthalmic solution Place 1 drop into both eyes At Bedtime  Qty: 2.5 mL, Refills: 11    Associated Diagnoses: Primary open angle glaucoma of both eyes, severe stage      metFORMIN (GLUCOPHAGE) 500 MG tablet TAKE 1 TAB BY MOUTH IN THE EVENING WITH DINNER  Qty: 30 tablet, Refills: 5    Associated Diagnoses: Diabetes mellitus type 2 without retinopathy (H)      nitroGLYcerin (NITROSTAT) 0.4 MG sublingual tablet DISSOLVE ONE TABLET UNDER TONGUE AS NEEDED FOR CHEST PAIN MAY REPEAT EVERY 5 MINUTES FOR 3 DOSES, IF SYMPTOMS PERSIST CALL 911  Qty: 25 tablet, Refills: 3    Associated Diagnoses: Coronary artery disease involving native coronary artery of native heart without angina pectoris      Nutritional Supplements (ENSURE NUTRITION SHAKE) LIQD Take 1 Bottle by mouth 2 times daily With meals  Qty: 60 Bottle, Refills: 0    Associated  "Diagnoses: Constipation, unspecified constipation type      risperiDONE (RISPERDAL) 0.5 MG tablet TAKE 1 TAB BY MOUTH AT BEDTIME  Qty: 30 tablet, Refills: 5    Associated Diagnoses: Schizoaffective disorder, unspecified type (H)      traZODone (DESYREL) 150 MG tablet TAKE 1 TAB BY MOUTH AT BEDTIME  Qty: 30 tablet, Refills: 11    Associated Diagnoses: Schizoaffective disorder, unspecified type (H)         STOP taking these medications       Cholecalciferol (VITAMIN D) 2000 UNITS tablet Comments:   Reason for Stopping:         fluticasone (FLONASE) 50 MCG/ACT nasal spray Comments:   Reason for Stopping:         nicotine (NICODERM CQ) 14 MG/24HR 24 hr patch Comments:   Reason for Stopping:         oxyCODONE (ROXICODONE) 5 MG tablet Comments:   Reason for Stopping:                     Consultations:     No consultations were requested during this admission             Brief History of Illness:   Sonya Foote is a 70 year old female who presents with worsening shortness of breath and intermittent chest pain that worsened today. She notes progressively worsening fatigue and dyspnea with exertion, resolved with frequent napping, since January 2020. A few nights ago she awoke drenched in sweat, denies PND or orthopnea. Denies fever or chills. Has had worsening cough with increased volume of \"white foamy\" sputum.   Had difficulty last night with body pain (unable to clarify if myalgias). She has been nauseous since yesterday. This morning she awoke feeling \"energized and ready to go with PT\" but when she got out of bed developed worsening dyspnea and substernal chest pain that is worse with breathing.  Notes chronic anosmia she attributes to chronic sinus problems, denies dysgeusia, denies worsening sinus or nasal congestion from baseline, has mild odynophagia.   Repeatedly complaining about how hot the room felt to her.      History notable for: in LTC to \"learn how to walk with new prosthetics\", She was at ENT clinic " yesterday for recurrent epistaxis after covid test in facility a few days ago, no cauterization requried. She has a history of 3 heart attacks, and notes this feels distinctly different from her ACS episodes in the past.      Per nursing home:  10am - reported SOB, chest pn, chills, faint, fatigue, weakness and extreme thirst  VS: included RR 22, Spo2 95%  MD called: advised rest and close observation, travel to hospital if symptoms persist  12pm - patient requested to go to ER for evaluation         Hospital Course:     Acute on Chronic dyspnea  CTA was done on admission with no evidence of PE or acute parenchymal disease. Has scarring at the basis of the lung bilaterally. Suspect has chronic interstitial process that needs to be worked up at some point. Most recent PFTs in 2015 with normal FEV1/FVC markedly reduced DLCO c/w interstitial process.   COVID was tested and was negative. This is her 5th test.      - COVID negative   - TTE with no new changes   - No evidence of acute pulmonary process   - Follow up with pulmonary medicine for repeat PFTs and further evaluation     Acute, intermittent chest pressure  GERD c/b esophogeal strictures s/p dilation  Recent history of esophogeal candidiasis 4/10/2020   ACS unlikely , CTA negative for PE. CT showed esophageal thickening. Suspect candida   esophagitis. Will treat empirically.      - Fluconazole 200 mg ---> 100 mg qday for 7-14 days      Fatigue likely 2/2 UTI   Has been complaining of diffuse fatigue. COVID-19 negative. UA positive for UTI.    - UA + for UTI ---> preliminary cultures positive for pseudomonas --> pending final cultures  - Ciprofloxacin for 5 days     Benign essential HTN  Coronary Artery Disease   History of CVA  - AP: aspirin 81 mg   - Statin: Atorvastatin 40 mg qday   - BB: metoprolol-xl 50 mg qday  - SCD PPX: EF >35%  - Continue PTA lisinopril      No change to chronic medical conditions.  T2DM (HgbA1c 5% 12/16/19)    Fibromyalgia  DJD    Mild  JOSE   Neurogenic bladder   B/l AKA   Decubitus pressure ulcer, present on admission   Osteoporosis   Epilepsy   Seasonal allergies, chronic sinusitis             Final Day of Progress before Discharge:     Patient Vitals for the past 12 hrs:   BP Temp Temp src Heart Rate Resp SpO2 Weight   06/12/20 0804 126/58 98.1  F (36.7  C) Oral 58 20 -- --   06/12/20 0645 -- -- -- -- -- -- 58.2 kg (128 lb 6.4 oz)   06/12/20 0515 101/57 98.8  F (37.1  C) Oral 61 20 98 % --   06/11/20 2320 105/54 98.5  F (36.9  C) Oral 58 16 99 % --       EXAM:  GEN: NAD, resting comfortably on exam, pleasant and cooperative , AAox3  HEENT: NC/AT , pale conjunctiva, anicteric sclera, EOMI,   Neck: trachea midline, no JVD  CV: RRR, nl S1/S2 no mumurs  PULM: anterior fields CTAB,   Abdomen: soft, mild suprapubic tender, BS +   EXTREMITIES: warm, no pedal edema  SKIN: No rashes  NEURO: MS: AAOx3 - no focal deficit   Psycho: good mood          Data:  All laboratory data reviewed             Significant Results:       Results for orders placed or performed during the hospital encounter of 06/10/20   XR Chest Port 1 View     Status: None    Narrative    EXAM: XR CHEST PORT 1 VW  6/10/2020 1:50 PM     HISTORY:  SOB       COMPARISON:  8/27/2019    FINDINGS:   AP view the chest. Midline trachea. Cardiac silhouette is partially  obscured on the left, however appears slightly enlarged. Aortic  atherosclerosis. Diffuse mixed interstitial and patchy pulmonary  opacities, more pronounced at the lung bases, left greater than right.  No pneumothorax. Likely small left effusion. No right-sided effusion.  Upper abdomen is unremarkable. No acute osseous abnormality.         Impression    IMPRESSION:   .1. Diffuse mixed interstitial and patchy pulmonary opacities, more  pronounced at the lung bases, left greater than right. Differential  includes edema and/or infection.  2. Small left pleural effusion.   3. Aortic atherosclerosis.    I have personally reviewed the  examination and initial interpretation  and I agree with the findings.    VERA DENIS MD   CT Chest Pulmonary Embolism w Contrast     Status: None    Narrative    Chest CT pulmonary angiogram with contrast    INDICATION: Shortness of breath    Contrast: 52 mL intravenous [id number]    COMPARISON: 5/2/2018    FINDINGS: Diffuse esophageal wall thickening, especially proximally  and distally. Small hiatal hernia. If there is history of dysphagia  comment please correlate with esophagram and/or upper GI endoscopy as  clinically appropriate. Left atrial cardiac chamber is enlarged. Upper  abdomen was grossly unremarkable. Thyroid appears grossly  unremarkable. Subcentimeter right hilar lymph node appears unchanged.  No enlarged axillary or mediastinal lymph nodes. Atherosclerotic  calcification in aorta and coronary arteries. No dominant pulmonary  nodule or groundglass opacity. Areas of scarring and subsegmental  atelectasis are present in both lung bases, slightly increased from  previous. Bones show degenerative disc changes throughout the thoracic  spine. Excessive kyphosis in the thorax.  The pulmonary artery tree was well-opacified with contrast comment no  evidence of film defect indicate embolus.      Impression    IMPRESSION: No CT evidence for pulmonary embolus. Esophageal  thickening comment please consider nonemergent esophagram or upper GI  endoscopy as clinically appropriate. Small hiatal hernia. Aortic and  coronary artery atherosclerosis. Bibasilar atelectasis and scarring  bilaterally. Excessive thoracic kyphosis.    VERA DENIS MD   CBC with platelets differential     Status: None   Result Value Ref Range    WBC 10.5 4.0 - 11.0 10e9/L    RBC Count 4.25 3.8 - 5.2 10e12/L    Hemoglobin 12.2 11.7 - 15.7 g/dL    Hematocrit 37.2 35.0 - 47.0 %    MCV 88 78 - 100 fl    MCH 28.7 26.5 - 33.0 pg    MCHC 32.8 31.5 - 36.5 g/dL    RDW 13.4 10.0 - 15.0 %    Platelet Count 299 150 - 450 10e9/L    Diff  Method Automated Method     % Neutrophils 68.8 %    % Lymphocytes 16.4 %    % Monocytes 10.1 %    % Eosinophils 3.7 %    % Basophils 0.4 %    % Immature Granulocytes 0.6 %    Nucleated RBCs 0 0 /100    Absolute Neutrophil 7.3 1.6 - 8.3 10e9/L    Absolute Lymphocytes 1.7 0.8 - 5.3 10e9/L    Absolute Monocytes 1.1 0.0 - 1.3 10e9/L    Absolute Eosinophils 0.4 0.0 - 0.7 10e9/L    Absolute Basophils 0.0 0.0 - 0.2 10e9/L    Abs Immature Granulocytes 0.1 0 - 0.4 10e9/L    Absolute Nucleated RBC 0.0    Comprehensive metabolic panel     Status: Abnormal   Result Value Ref Range    Sodium 134 133 - 144 mmol/L    Potassium 3.6 3.4 - 5.3 mmol/L    Chloride 102 94 - 109 mmol/L    Carbon Dioxide 29 20 - 32 mmol/L    Anion Gap 4 3 - 14 mmol/L    Glucose 74 70 - 99 mg/dL    Urea Nitrogen 11 7 - 30 mg/dL    Creatinine 0.54 0.52 - 1.04 mg/dL    GFR Estimate >90 >60 mL/min/[1.73_m2]    GFR Estimate If Black >90 >60 mL/min/[1.73_m2]    Calcium 8.8 8.5 - 10.1 mg/dL    Bilirubin Total 0.1 (L) 0.2 - 1.3 mg/dL    Albumin 2.9 (L) 3.4 - 5.0 g/dL    Protein Total 8.2 6.8 - 8.8 g/dL    Alkaline Phosphatase 263 (H) 40 - 150 U/L    ALT 62 (H) 0 - 50 U/L    AST 47 (H) 0 - 45 U/L   Troponin I     Status: None   Result Value Ref Range    Troponin I ES <0.015 0.000 - 0.045 ug/L   D dimer quantitative     Status: Abnormal   Result Value Ref Range    D Dimer 0.6 (H) 0.0 - 0.50 ug/ml FEU   Blood gas venous     Status: Abnormal   Result Value Ref Range    Ph Venous 7.34 7.32 - 7.43 pH    PCO2 Venous 58 (H) 40 - 50 mm Hg    PO2 Venous 32 25 - 47 mm Hg    Bicarbonate Venous 31 (H) 21 - 28 mmol/L    Base Excess Venous 3.5 mmol/L    FIO2 21    Nt probnp inpatient (BNP)     Status: None   Result Value Ref Range    N-Terminal Pro BNP Inpatient 31 0 - 900 pg/mL   Symptomatic COVID-19 Virus (Coronavirus) by PCR     Status: None    Specimen: Nasopharyngeal   Result Value Ref Range    COVID-19 Virus PCR to [facility] - Source Nasopharyngeal     COVID-19 Virus PCR to  [facility] - Result       Test received-See reflex to IDDL test SARS CoV2 (COVID-19) Virus RT-PCR   SARS-CoV-2 COVID-19 Virus (Coronavirus) RT-PCR Nasopharyngeal     Status: None    Specimen: Nasopharyngeal   Result Value Ref Range    SARS-CoV-2 Virus Specimen Source Nasopharyngeal     SARS-CoV-2 PCR Result NEGATIVE     SARS-CoV-2 PCR Comment       This automated, real-time RT-PCR assay by [employer] on the [employer] has   been given Emergency Use Authorization (EUA) for the in vitro qualitative detection of RNA   from the SARS-CoV2 virus in nasopharyngeal swabs in viral transport medium from patients   with signs and symptoms of infection who are suspected of COVID-19. The performance is   unknown in asymptomatic patients.     CRP inflammation     Status: Abnormal   Result Value Ref Range    CRP Inflammation 46.0 (H) 0.0 - 8.0 mg/L   Glucose by meter     Status: None   Result Value Ref Range    Glucose 76 70 - 99 mg/dL   Comprehensive metabolic panel     Status: Abnormal   Result Value Ref Range    Sodium 137 133 - 144 mmol/L    Potassium 4.2 3.4 - 5.3 mmol/L    Chloride 108 94 - 109 mmol/L    Carbon Dioxide 24 20 - 32 mmol/L    Anion Gap 5 3 - 14 mmol/L    Glucose 102 (H) 70 - 99 mg/dL    Urea Nitrogen 8 7 - 30 mg/dL    Creatinine 0.51 (L) 0.52 - 1.04 mg/dL    GFR Estimate >90 >60 mL/min/[1.73_m2]    GFR Estimate If Black >90 >60 mL/min/[1.73_m2]    Calcium 8.8 8.5 - 10.1 mg/dL    Bilirubin Total 0.1 (L) 0.2 - 1.3 mg/dL    Albumin 2.8 (L) 3.4 - 5.0 g/dL    Protein Total 8.5 6.8 - 8.8 g/dL    Alkaline Phosphatase 262 (H) 40 - 150 U/L    ALT 64 (H) 0 - 50 U/L    AST 44 0 - 45 U/L   Lactate Dehydrogenase     Status: None   Result Value Ref Range    Lactate Dehydrogenase 136 81 - 234 U/L   CK total     Status: None   Result Value Ref Range    CK Total 73 30 - 225 U/L   INR     Status: None   Result Value Ref Range    INR 1.06 0.86 - 1.14   Partial thromboplastin time     Status: None   Result Value Ref  Range    PTT 27 22 - 37 sec   Fibrinogen activity     Status: Abnormal   Result Value Ref Range    Fibrinogen 518 (H) 200 - 420 mg/dL   Antithrombin III     Status: None   Result Value Ref Range    Antithrombin III Chromogenic 121 85 - 135 %   Ferritin     Status: None   Result Value Ref Range    Ferritin 35 8 - 252 ng/mL   Interleukin 6 Blood     Status: Abnormal   Result Value Ref Range    Interleukin 6 Blood 8.52 (H) <3.01 pg/mL   CBC with Platelets & Differential     Status: Abnormal   Result Value Ref Range    WBC 11.6 (H) 4.0 - 11.0 10e9/L    RBC Count 4.00 3.8 - 5.2 10e12/L    Hemoglobin 11.7 11.7 - 15.7 g/dL    Hematocrit 34.6 (L) 35.0 - 47.0 %    MCV 87 78 - 100 fl    MCH 29.3 26.5 - 33.0 pg    MCHC 33.8 31.5 - 36.5 g/dL    RDW 13.3 10.0 - 15.0 %    Platelet Count 287 150 - 450 10e9/L    Diff Method Automated Method     % Neutrophils 84.7 %    % Lymphocytes 9.4 %    % Monocytes 5.0 %    % Eosinophils 0.4 %    % Basophils 0.2 %    % Immature Granulocytes 0.3 %    Nucleated RBCs 0 0 /100    Absolute Neutrophil 9.8 (H) 1.6 - 8.3 10e9/L    Absolute Lymphocytes 1.1 0.8 - 5.3 10e9/L    Absolute Monocytes 0.6 0.0 - 1.3 10e9/L    Absolute Eosinophils 0.1 0.0 - 0.7 10e9/L    Absolute Basophils 0.0 0.0 - 0.2 10e9/L    Abs Immature Granulocytes 0.0 0 - 0.4 10e9/L    Absolute Nucleated RBC 0.0    Reticulocyte count     Status: None   Result Value Ref Range    % Retic 1.5 0.5 - 2.0 %    Absolute Retic 60.4 25 - 95 10e9/L   Basic metabolic panel     Status: None   Result Value Ref Range    Sodium 137 133 - 144 mmol/L    Potassium 4.6 3.4 - 5.3 mmol/L    Chloride 107 94 - 109 mmol/L    Carbon Dioxide 25 20 - 32 mmol/L    Anion Gap 5 3 - 14 mmol/L    Glucose 95 70 - 99 mg/dL    Urea Nitrogen 10 7 - 30 mg/dL    Creatinine 0.57 0.52 - 1.04 mg/dL    GFR Estimate >90 >60 mL/min/[1.73_m2]    GFR Estimate If Black >90 >60 mL/min/[1.73_m2]    Calcium 8.8 8.5 - 10.1 mg/dL   Lactate Dehydrogenase     Status: None   Result Value Ref  Range    Lactate Dehydrogenase 124 81 - 234 U/L   INR     Status: None   Result Value Ref Range    INR 1.12 0.86 - 1.14   Fibrinogen activity     Status: Abnormal   Result Value Ref Range    Fibrinogen 497 (H) 200 - 420 mg/dL   CRP inflammation     Status: Abnormal   Result Value Ref Range    CRP Inflammation 33.0 (H) 0.0 - 8.0 mg/L   D dimer quantitative     Status: Abnormal   Result Value Ref Range    D Dimer 0.6 (H) 0.0 - 0.50 ug/ml FEU   Reticulocyte count     Status: None   Result Value Ref Range    % Retic 1.5 0.5 - 2.0 %    Absolute Retic 67.5 25 - 95 10e9/L   Troponin I     Status: None   Result Value Ref Range    Troponin I ES <0.015 0.000 - 0.045 ug/L   Troponin I     Status: None   Result Value Ref Range    Troponin I ES <0.015 0.000 - 0.045 ug/L   Troponin I     Status: None   Result Value Ref Range    Troponin I ES <0.015 0.000 - 0.045 ug/L   Troponin I     Status: None   Result Value Ref Range    Troponin I ES <0.015 0.000 - 0.045 ug/L   UA with Microscopic     Status: Abnormal   Result Value Ref Range    Color Urine Yellow     Appearance Urine Slightly Cloudy     Glucose Urine Negative NEG^Negative mg/dL    Bilirubin Urine Negative NEG^Negative    Ketones Urine Negative NEG^Negative mg/dL    Specific Gravity Urine 1.011 1.003 - 1.035    Blood Urine Small (A) NEG^Negative    pH Urine 6.0 5.0 - 7.0 pH    Protein Albumin Urine Negative NEG^Negative mg/dL    Urobilinogen mg/dL Normal 0.0 - 2.0 mg/dL    Nitrite Urine Negative NEG^Negative    Leukocyte Esterase Urine Large (A) NEG^Negative    Source Catheterized Urine     WBC Urine 22 (H) 0 - 5 /HPF    RBC Urine 2 0 - 2 /HPF    Bacteria Urine Few (A) NEG^Negative /HPF    Squamous Epithelial /HPF Urine <1 0 - 1 /HPF    Transitional Epi <1 0 - 1 /HPF    Mucous Urine Present (A) NEG^Negative /LPF   Troponin I     Status: None   Result Value Ref Range    Troponin I ES <0.015 0.000 - 0.045 ug/L   TSH     Status: None   Result Value Ref Range    TSH 1.09 0.40 -  4.00 mU/L   CBC with Platelets & Differential     Status: Abnormal   Result Value Ref Range    WBC 9.7 4.0 - 11.0 10e9/L    RBC Count 3.84 3.8 - 5.2 10e12/L    Hemoglobin 11.2 (L) 11.7 - 15.7 g/dL    Hematocrit 34.0 (L) 35.0 - 47.0 %    MCV 89 78 - 100 fl    MCH 29.2 26.5 - 33.0 pg    MCHC 32.9 31.5 - 36.5 g/dL    RDW 13.5 10.0 - 15.0 %    Platelet Count 253 150 - 450 10e9/L    Diff Method Automated Method     % Neutrophils 65.4 %    % Lymphocytes 20.3 %    % Monocytes 11.2 %    % Eosinophils 2.3 %    % Basophils 0.5 %    % Immature Granulocytes 0.3 %    Nucleated RBCs 0 0 /100    Absolute Neutrophil 6.3 1.6 - 8.3 10e9/L    Absolute Lymphocytes 2.0 0.8 - 5.3 10e9/L    Absolute Monocytes 1.1 0.0 - 1.3 10e9/L    Absolute Eosinophils 0.2 0.0 - 0.7 10e9/L    Absolute Basophils 0.1 0.0 - 0.2 10e9/L    Abs Immature Granulocytes 0.0 0 - 0.4 10e9/L    Absolute Nucleated RBC 0.0    Reticulocyte count     Status: None   Result Value Ref Range    % Retic 1.4 0.5 - 2.0 %    Absolute Retic 53.8 25 - 95 10e9/L   Basic metabolic panel     Status: Abnormal   Result Value Ref Range    Sodium 141 133 - 144 mmol/L    Potassium 3.8 3.4 - 5.3 mmol/L    Chloride 108 94 - 109 mmol/L    Carbon Dioxide 27 20 - 32 mmol/L    Anion Gap 6 3 - 14 mmol/L    Glucose 91 70 - 99 mg/dL    Urea Nitrogen 13 7 - 30 mg/dL    Creatinine 0.49 (L) 0.52 - 1.04 mg/dL    GFR Estimate >90 >60 mL/min/[1.73_m2]    GFR Estimate If Black >90 >60 mL/min/[1.73_m2]    Calcium 8.5 8.5 - 10.1 mg/dL   CRP inflammation     Status: Abnormal   Result Value Ref Range    CRP Inflammation 24.0 (H) 0.0 - 8.0 mg/L   Troponin I     Status: None   Result Value Ref Range    Troponin I ES <0.015 0.000 - 0.045 ug/L   Troponin I     Status: None   Result Value Ref Range    Troponin I ES <0.015 0.000 - 0.045 ug/L   Glucose by meter     Status: Abnormal   Result Value Ref Range    Glucose 101 (H) 70 - 99 mg/dL   EKG 12 lead     Status: None   Result Value Ref Range    Interpretation ECG  Click View Image link to view waveform and result    Echo Complete     Status: None    Narrative    [id number]  [id number]  [id number]  [id number]^LONDON WEBB^BONIFACIO^KAY           [facility]  Echocardiography Laboratory  [address] [place] [address]     Name: SHARATH MERCEDES  MRN: [id number]  : 1950  Study Date: 2020 01:35 PM  Age: 70 yrs  Gender: Female  Patient Location: [facility]  Reason For Study: Chest Discomfort  Ordering Physician: BONIFACIO MARTIN  Performed By: Ajay Gastelum RDCS     BSA: 1.2 m2  Height: 42 in  Weight: 126 lb  HR: 51  BP: 108/70 mmHg  _____________________________________________________________________________  __        Procedure  Complete Portable Echo Adult.  _____________________________________________________________________________  __        Interpretation Summary  Global and regional left ventricular function is normal with an EF of 60-65%.  No regional wall motion abnormalities are seen.  Right ventricular function, chamber size, wall motion, and thickness are  normal.  No hemodynamically significant valvular abnoramalities were noted.  The inferior vena cava was normal in size with preserved respiratory  variability.  No pericardial effusion is present.     This study was compared with the study from 2019 . There has been no  change.  _____________________________________________________________________________  __        Left Ventricle  Global and regional left ventricular function is normal with an EF of 60-65%.  Left ventricular wall thickness is normal. Relative wall thickness is  increased consistent with concentric remodeling. Thickening of the anterobasal  septum is present. Left ventricular diastolic function is normal. No regional  wall motion abnormalities are seen.     Right Ventricle  Right ventricular function, chamber size, wall motion, and thickness are  normal.     Atria  Both atria appear normal.     Mitral  Valve  The mitral valve is normal. Mild mitral insufficiency is present.        Aortic Valve  Trileaflet aortic sclerosis without stenosis.     Tricuspid Valve  The tricuspid valve is normal. Trace tricuspid insufficiency is present. The  right ventricular systolic pressure is approximated at 15.1 mmHg plus the  right atrial pressure. Pulmonary artery systolic pressure is normal.     Pulmonic Valve  The valve leaflets are not well visualized. On Doppler interrogation, there is  no significant stenosis or regurgitation.     Vessels  Normal diameter aortic root and proximal ascending aorta. The inferior vena  cava was normal in size with preserved respiratory variability. IVC diameter  <2.1 cm collapsing >50% with sniff suggests a normal RA pressure of 3 mmHg.     Pericardium  No pericardial effusion is present.        Compared to Previous Study  This study was compared with the study from 2019 . There has been no  change.     Attestation  I have personally viewed the imaging and agree with the interpretation and  report as documented by the fellow, Parul Espinoza MD, and/or edited by me.  _____________________________________________________________________________  __     MMode/2D Measurements & Calculations  IVSd: 1.1 cm  LVIDd: 3.7 cm  LVIDs: 2.3 cm  LVPWd: 0.88 cm  FS: 38.5 %  LV mass(C)d: 109.1 grams  LV mass(C)dI: 92.1 grams/m2  asc Aorta Diam: 2.8 cm  LVOT diam: 2.4 cm  LVOT area: 4.5 cm2  LA Volume Index (BP): 58.2 ml/m2     RWT: 0.47  TAPSE: 2.3 cm        Doppler Measurements & Calculations  MV E max leander: 67.2 cm/sec  MV A max leander: 63.3 cm/sec  MV E/A: 1.1  MV dec slope: 221.8 cm/sec2  MV dec time: 0.30 sec  TV max PG: 15.1 mmHg  PA acc time: 0.18 sec  TR max leander: 194.4 cm/sec  TR max PG: 15.1 mmHg  E/E' av.3  Lateral E/e': 6.1  Medial E/e': 10.5        _____________________________________________________________________________  __        Report approved by: Olivia Honeycutt 2020  02:44 PM      Respiratory Panel PCR - NP Swab     Status: None    Specimen: Nasopharyngeal swab   Result Value Ref Range    Adenovirus Not Detected NDET^Not Detected    Coronavirus Not Detected NDET^Not Detected    Human Metapneumovirus Not Detected NDET^Not Detected    Human Rhinovirus/Enterovirus Not Detected NDET^Not Detected    Influenza A Not Detected NDET^Not Detected    Influenza A, H1 Not Detected NDET^Not Detected    Influenza A 2009 H1N1 Not Detected NDET^Not Detected    Influenza A, H3 Not Detected NDET^Not Detected    Influenza B Not Detected NDET^Not Detected    Parainfluenza Virus 1 Not Detected NDET^Not Detected    Parainfluenza Virus 2 Not Detected NDET^Not Detected    Parainfluenza Virus 3 Not Detected NDET^Not Detected    Parainfluenza Virus 4 Not Detected NDET^Not Detected    Respiratory Syncytial Virus A Not Detected NDET^Not Detected    Respiratory Syncytial Virus B Not Detected NDET^Not Detected    Chlamydia pneumoniae Not Detected NDET^Not Detected    Mycoplasma pneumoniae Not Detected NDET^Not Detected    Respiratory Virus Comment See comment below    Urine Culture Aerobic Bacterial     Status: Abnormal (Preliminary result)    Specimen: Catheterized Urine   Result Value Ref Range    Specimen Description Catheterized Urine     Special Requests Specimen received in preservative     Culture Micro (A)      >100,000 colonies/mL  Non lactose fermenting gram negative rods      Culture Micro Culture in progress    Urine Culture     Status: Abnormal (Preliminary result)    Specimen: Urine catheter; Catheterized Urine   Result Value Ref Range    Specimen Description Catheterized Urine     Culture Micro (A)      >100,000 colonies/mL  Pseudomonas aeruginosa  Susceptibility testing in progress          Recent Results (from the past 48 hour(s))   XR Chest Port 1 View    Narrative    EXAM: XR CHEST PORT 1 VW  6/10/2020 1:50 PM     HISTORY:  SOB       COMPARISON:  8/27/2019    FINDINGS:   AP view the chest.  Midline trachea. Cardiac silhouette is partially  obscured on the left, however appears slightly enlarged. Aortic  atherosclerosis. Diffuse mixed interstitial and patchy pulmonary  opacities, more pronounced at the lung bases, left greater than right.  No pneumothorax. Likely small left effusion. No right-sided effusion.  Upper abdomen is unremarkable. No acute osseous abnormality.         Impression    IMPRESSION:   .1. Diffuse mixed interstitial and patchy pulmonary opacities, more  pronounced at the lung bases, left greater than right. Differential  includes edema and/or infection.  2. Small left pleural effusion.   3. Aortic atherosclerosis.    I have personally reviewed the examination and initial interpretation  and I agree with the findings.    VERA DENIS MD   CT Chest Pulmonary Embolism w Contrast    Narrative    Chest CT pulmonary angiogram with contrast    INDICATION: Shortness of breath    Contrast: 52 mL intravenous Isovue-370    COMPARISON: 5/2/2018    FINDINGS: Diffuse esophageal wall thickening, especially proximally  and distally. Small hiatal hernia. If there is history of dysphagia  comment please correlate with esophagram and/or upper GI endoscopy as  clinically appropriate. Left atrial cardiac chamber is enlarged. Upper  abdomen was grossly unremarkable. Thyroid appears grossly  unremarkable. Subcentimeter right hilar lymph node appears unchanged.  No enlarged axillary or mediastinal lymph nodes. Atherosclerotic  calcification in aorta and coronary arteries. No dominant pulmonary  nodule or groundglass opacity. Areas of scarring and subsegmental  atelectasis are present in both lung bases, slightly increased from  previous. Bones show degenerative disc changes throughout the thoracic  spine. Excessive kyphosis in the thorax.  The pulmonary artery tree was well-opacified with contrast comment no  evidence of film defect indicate embolus.      Impression    IMPRESSION: No CT evidence for  pulmonary embolus. Esophageal  thickening comment please consider nonemergent esophagram or upper GI  endoscopy as clinically appropriate. Small hiatal hernia. Aortic and  coronary artery atherosclerosis. Bibasilar atelectasis and scarring  bilaterally. Excessive thoracic kyphosis.    VERA DENIS MD   Echo Complete    Narrative    [id number]  [id number]  [id number]  [id number]^LONDON WEBB^BONIFACIO^KAY           [facility],[place]  Echocardiography Laboratory  [address] [place] [address]     Name: SHARATH MERCEDES  MRN: [id number]  : 1950  Study Date: 2020 01:35 PM  Age: 70 yrs  Gender: Female  Patient Location: [facility]  Reason For Study: Chest Discomfort  Ordering Physician: BONIFACIO MARTIN  Performed By: Ajay Gastelum RDCS     BSA: 1.2 m2  Height: 42 in  Weight: 126 lb  HR: 51  BP: 108/70 mmHg  _____________________________________________________________________________  __        Procedure  Complete Portable Echo Adult.  _____________________________________________________________________________  __        Interpretation Summary  Global and regional left ventricular function is normal with an EF of 60-65%.  No regional wall motion abnormalities are seen.  Right ventricular function, chamber size, wall motion, and thickness are  normal.  No hemodynamically significant valvular abnoramalities were noted.  The inferior vena cava was normal in size with preserved respiratory  variability.  No pericardial effusion is present.     This study was compared with the study from 2019 . There has been no  change.  _____________________________________________________________________________  __        Left Ventricle  Global and regional left ventricular function is normal with an EF of 60-65%.  Left ventricular wall thickness is normal. Relative wall thickness is  increased consistent with concentric remodeling. Thickening of the anterobasal  septum is present. Left  ventricular diastolic function is normal. No regional  wall motion abnormalities are seen.     Right Ventricle  Right ventricular function, chamber size, wall motion, and thickness are  normal.     Atria  Both atria appear normal.     Mitral Valve  The mitral valve is normal. Mild mitral insufficiency is present.        Aortic Valve  Trileaflet aortic sclerosis without stenosis.     Tricuspid Valve  The tricuspid valve is normal. Trace tricuspid insufficiency is present. The  right ventricular systolic pressure is approximated at 15.1 mmHg plus the  right atrial pressure. Pulmonary artery systolic pressure is normal.     Pulmonic Valve  The valve leaflets are not well visualized. On Doppler interrogation, there is  no significant stenosis or regurgitation.     Vessels  Normal diameter aortic root and proximal ascending aorta. The inferior vena  cava was normal in size with preserved respiratory variability. IVC diameter  <2.1 cm collapsing >50% with sniff suggests a normal RA pressure of 3 mmHg.     Pericardium  No pericardial effusion is present.        Compared to Previous Study  This study was compared with the study from 11/13/2019 . There has been no  change.     Attestation  I have personally viewed the imaging and agree with the interpretation and  report as documented by the fellow, Parul Espinoza MD, and/or edited by me.  _____________________________________________________________________________  __     MMode/2D Measurements & Calculations  IVSd: 1.1 cm  LVIDd: 3.7 cm  LVIDs: 2.3 cm  LVPWd: 0.88 cm  FS: 38.5 %  LV mass(C)d: 109.1 grams  LV mass(C)dI: 92.1 grams/m2  asc Aorta Diam: 2.8 cm  LVOT diam: 2.4 cm  LVOT area: 4.5 cm2  LA Volume Index (BP): 58.2 ml/m2     RWT: 0.47  TAPSE: 2.3 cm        Doppler Measurements & Calculations  MV E max leander: 67.2 cm/sec  MV A max leander: 63.3 cm/sec  MV E/A: 1.1  MV dec slope: 221.8 cm/sec2  MV dec time: 0.30 sec  TV max PG: 15.1 mmHg  PA acc time: 0.18 sec  TR max leander:  194.4 cm/sec  TR max PG: 15.1 mmHg  E/E' av.3  Lateral E/e': 6.1  Medial E/e': 10.5        _____________________________________________________________________________  __        Report approved by: Olivia Honeycutt 2020 02:44 PM                   Pending Results:     Unresulted Labs Ordered in the Past 30 Days of this Admission     No orders found from 2020 to 2020.                  Discharge Instructions and Follow-Up:   No discharge procedures on file.     Date of service: 2020     >30 minutes spent in discharge, including >50% in counseling and coordination of care, medication review and plan of care recommended on follow up. Questions were answered.   It was our pleasure to care for Ms. Foote. Please do not hesitate to contact me should there be questions regarding the hospital course or discharge plan.      Missy Jarrett MD  Internal Medicine Hospitalist & Staff Physician  [facility]  Pager: [phone]

## 2020-06-12 NOTE — PLAN OF CARE
OT Defer:    Discharge Planner OT   Patient plan for discharge: LTC   Current status: OT orders received, reviewed and completed. Per and thorough chart review, pt w/ no OT needs at this time. Pt  w/c bound at baseline, A at LTC for transfers, no cognitive concerns at this time per PT. Per discussion w/ SW, plan for pt to discharge back to LTC today w/ continued baseline assist. OT will complete orders and cancel OT eval. OT consult stated reason for consult silverware. Inpt OT w/ no silverware to administer and this need is handled with home OT. OT will defer to home therapy at LTC to assist pt w/ getting needed AE for eating. Thank you for the referral.   Barriers to return to prior living situation: none per OT  Recommendations for discharge: LTC  Rationale for recommendations: w/ continued assist and home OT    Thank you for the referral         Entered by: Cami Carranza 06/12/2020 10:10 AM

## 2020-06-12 NOTE — PLAN OF CARE
Neuro: A&Ox4. Blind in L eye   Cardiac: SB HR as low as 49 mainly sustains in 50's. Denies of any chest pain. VSS.  Respiratory: Sating 100% on RA although wishes to have 1L of O2 d/t SOB.   GI/: Adequate urine output via Suprapubic cath. No BM  Diet/appetite: Tolerating Reg diet. Eating well. Takes pills whole in applesauce.  Activity:  Bilat AKA turns self indep.  Pain: Oxy Q6hr c/o low back pain.    Skin: No new deficits noted. Pressure injury to coccyx.   LDA's: R PIV SL     Plan: Continue with POC. Possible discharge today.  Notify primary team with changes.

## 2020-06-12 NOTE — PROGRESS NOTES
SPIRITUAL HEALTH SERVICES  SPIRITUAL ASSESSMENT Progress Note  [facility] 6B     REFERRAL SOURCE: Follow-up    Offered in-person visit to assess needs as communication proved difficult in yesterday's tele-visit. Sonya stated she is doing well, is discharging this afternoon, and declined needs at present.    PLAN: Spiritual health services remain available.    Cindy Parker  Chaplain Resident  Pager [phone]    [facility] remains available 24/7 for emergent requests/referrals, either by having the switchboard page the on-call  or by entering an ASAP/STAT consult in Epic (this will also page the on-call ).

## 2020-06-12 NOTE — PLAN OF CARE
"  BP 97/54 (BP Location: Left arm)   Pulse 64   Temp 99.4  F (37.4  C) (Oral)   Resp 20   Ht 1.092 m (3' 7\")   Wt 58.2 kg (128 lb 6.4 oz)   SpO2 100%   BMI 48.82 kg/m      Time: 6027-9600.  Reason for admission: Worsening dyspnea, chest pain, increasing cough and sputum.      VS: VSS on RA with O2 sats in mid to high 90s. Afebrile.   Activity: Up with assist x1. Calls appropriately. Repositions independently in bed.   Neuros: A&Ox4. Blind in left eye, baseline. Bilateral AKA. C/o lower back pain managed with PRN PO Oxycodone.   Cardiac: SB, 50-60s. Normotensive, 90-100s/50-60s. Denies chest pain.   Respiratory: LS diminished. Denies SOB, c/o BEDOYA. Stable on RA. IS encouraged.   GI/: Suprapubic cath with good urine output, dressing changed, CDI. X2 BMs on this shift, received suppository, declined enema. +BS, +gas. Denies nausea or vomiting.   Diet: Regular diet, tolerating well.   Skin: Mepliex to coccyx.   Lines: Right PIV removed.   Labs: WDL.     New changes this shift/Plan: VSS on RA, SB. Pain well controlled. Cath with good urine output. X2 BMs, tolerating diet, denies nausea. Mepilex to coccyx. PIV removed. Medically cleared for discharge back to facility. PIV removed. Packet printed and sent with pt. Belongings sent with pt. RN attempted to call report to facility at 1415 with no answer, will retry.   Will continue to monitor & follow POC.    ADDENDUM at 1434: Report called at 1435 to RN at [facility].   "

## 2020-06-14 DIAGNOSIS — J44.9 CHRONIC OBSTRUCTIVE PULMONARY DISEASE, UNSPECIFIED COPD TYPE (H): Primary | ICD-10-CM

## 2020-06-14 DIAGNOSIS — J20.9 ACUTE BRONCHITIS: ICD-10-CM

## 2020-06-14 LAB
BACTERIA SPEC CULT: ABNORMAL
SPECIMEN SOURCE: ABNORMAL

## 2020-06-15 ENCOUNTER — PATIENT OUTREACH (OUTPATIENT)
Dept: CARE COORDINATION | Facility: CLINIC | Age: 70
End: 2020-06-15

## 2020-06-15 ENCOUNTER — TELEPHONE (OUTPATIENT)
Dept: INTERNAL MEDICINE | Facility: CLINIC | Age: 70
End: 2020-06-15

## 2020-06-15 DIAGNOSIS — Z71.89 COUNSELING AND COORDINATION OF CARE: Primary | ICD-10-CM

## 2020-06-15 DIAGNOSIS — Z20.822 COVID-19 RULED OUT: ICD-10-CM

## 2020-06-15 LAB
BACTERIA SPEC CULT: ABNORMAL
BACTERIA SPEC CULT: ABNORMAL
Lab: ABNORMAL
SPECIMEN SOURCE: ABNORMAL

## 2020-06-15 NOTE — TELEPHONE ENCOUNTER
Torie Bronson, Ok to resume PT.    Informed approved per standing orders. HC staff will fax these orders for MD signature.      Valeria CACERESN, RN, PHN

## 2020-06-15 NOTE — LETTER
[facility]   To:   [facility]          Please give to facility    From:   [facility] Care Coordinator [facility] [address] [facility]     Patient Name:  Sonya Foote YOB: 1950   Admit date: 6/12/20      *Information Needed:  Please contact me when the patient will discharge (or if they will move to long term care)- include the discharge date, disposition, and main diagnosis   - If the patient is discharged with home care services, please provide the name of the agency    Also- Please inform me if a care conference is being held.   Phone, Fax or Email with information       Thank You,   LLOYD Frances  Clinic Care Coordinator  [facility]  Ph: [phone]  [email or web address]

## 2020-06-15 NOTE — PROGRESS NOTES
Clinic Care Coordination Contact  Care Coordination Transition Communication    Referral Source: IP Handoff    Clinical Data: Patient was hospitalized at [facility] from 6/10/20 to 6/12/20 with diagnosis of chest pain on breathing/SOB. Inpatient team could not find any acute lung process that caused SOB and they do believe pt has chronic inflammation in the lung; pt should follow up with lung doctor. Pt was found to have a UTI and was given antibiotics course. Pt was also treated for a possible fungal infection in the esophagus.     Medication changes:  START taking:  Ascorbic Acid  Start taking on: June 13, 2020  ciprofloxacin (CIPRO)  Start taking on: June 13, 2020  fluconazole (DIFLUCAN)  Start taking on: June 13, 2020  multivitamin, therapeutic  senna-docusate (SENOKOT-S/PERICOLACE)  vitamin A  Start taking on: June 13, 2020  zinc sulfate (ZINCATE)  Start taking on: June 13, 2020  STOP taking:  sulfamethoxazole-trimethoprim 800-160 MG tablet (BACTRIM DS)    Transition to Facility: (Pt returned to previous LTC facility)              Facility Name: [facility]              Contact name and phone number/fax: Phone: [phone], Fax: [phone]    Follow up:  -Follow up with Nursing home physician. No follow up labs or test are needed.  -Follow up with pulmonary medicine in 1-2 months for repeat PFTs    [facility] CC made phone call to pt, requested CC call facility to see about any outstanding needs. JUANCARLOS CC made phone call to nurse on pt's unit, no answer.     JUANCARLOS CC updated care team with [facility].     Plan: [facility] will await notification from facility for any outstanding needs. No further outreaches will be made at this time unless a new referral is made or a change in the pt's status occurs.     LLOYD Frances   Social Work Clinic Care Coordinator   [facility]   PH: [phone]  [email or web address]

## 2020-06-16 NOTE — TELEPHONE ENCOUNTER
Routing refill request to provider for review/approval because:  Order for Serevent, Striverdi, or Foradil and pt has steroid inhaler

## 2020-06-22 NOTE — PROGRESS NOTES
Subjective     Sonya Foote is a 70 year old female who presents to clinic today for the following health issues:    HPI     Patient arrived late for appt but still seen.    Hospital Follow-up Visit:    Hospital/Nursing Home/IP Rehab Facility: [facility]  Date of Admission: 6/10/20  Date of Discharge: 6/12/20  Reason(s) for Admission: Pressure injury of right lower back, shortness of breath       Was your hospitalization related to COVID-19? No   Problems taking medications regularly:  None  Medication changes since discharge: noted  Problems adhering to non-medication therapy:  None    Summary of hospitalization:  See outside records, reviewed and scanned  Diagnostic Tests/Treatments reviewed.  Follow up needed: none  Other Healthcare Providers Involved in Patient s Care:         None  Update since discharge: improved.       Post Discharge Medication Reconciliation: discharge medications reconciled, continue medications without change.  Plan of care communicated with patient              Acute on Chronic dyspnea  CTA was done on admission with no evidence of PE or acute parenchymal disease. Has scarring at the basis of the lung bilaterally. Suspect has chronic interstitial process that needs to be worked up at some point. Most recent PFTs in 2015 with normal FEV1/FVC markedly reduced DLCO c/w interstitial process.   COVID was tested and was negative. This is her 5th test.      - COVID negative   - TTE with no new changes   - No evidence of acute pulmonary process   - Follow up with pulmonary medicine for repeat PFTs and further evaluation     Acute, intermittent chest pressure  GERD c/b esophogeal strictures s/p dilation  Recent history of esophogeal candidiasis 4/10/2020   ACS unlikely , CTA negative for PE. CT showed esophageal thickening. Suspect candida   esophagitis. Will treat empirically.      - Fluconazole 200 mg ---> 100 mg qday for 7-14 days      Fatigue likely 2/2 UTI   Has been  complaining of diffuse fatigue. COVID-19 negative. UA positive for UTI.     - UA + for UTI ---> preliminary cultures positive for pseudomonas --> pending final cultures  - Ciprofloxacin for 5 days      Benign essential HTN  Coronary Artery Disease   History of CVA  - AP: aspirin 81 mg   - Statin: Atorvastatin 40 mg qday   - BB: metoprolol-xl 50 mg qday  - SCD PPX: EF >35%  - Continue PTA lisinopril        Other concerns:  1. Patient being discharged from [facility] to [facility] on 7/1/20. Needs discharge orders.  requesting DME orders for 2WW, hospital bed w/ side rails, shower bench, transfer pole and air mattress. Face to face documentation of need for all DME's must state why each DME is needed and supporting information.     Patient Active Problem List   Diagnosis     Chronic pain syndrome     Morbid obesity (H)     History of blood transfusion     History of central retinal artery occlusion     COPD (chronic obstructive pulmonary disease) (H)     Type 2 diabetes mellitus (H)     Benign essential hypertension     History of stroke     Sickle cell trait (H)     MDD (major depressive disorder)     Seizure disorder (H)     Chronic back pain     Chronic neck pain     ROGER (generalized anxiety disorder)     Person who has had sex change operation     Osteoporosis     PAD (peripheral artery disease) (H)     Peripheral neuropathy     JOSE (obstructive sleep apnea)     Amputation above knee (H)     Blind left eye     Neurogenic bladder     Acid reflux disease     Hx of carotid artery stenosis     Complex sleep apnea syndrome     Dysphagia     CAD (coronary artery disease)     Chest pain on breathing     Past Surgical History:   Procedure Laterality Date     AMPUTATE LEG ABOVE KNEE Left 6/11/2016    Procedure: AMPUTATE LEG ABOVE KNEE;  Surgeon: Mello Rodriguez MD;  [facility]     AMPUTATE LEG BELOW KNEE Right 11/7/2016    Procedure: AMPUTATE LEG BELOW KNEE;  Surgeon: Carleen  MD Savannah;  [facility]     AMPUTATE REVISION STUMP LOWER EXTREMITY Right 11/11/2016    Procedure: AMPUTATE REVISION STUMP LOWER EXTREMITY;  Surgeon: Savannah Durant MD;  [facility]     AMPUTATE REVISION STUMP LOWER EXTREMITY Right 11/16/2016    Procedure: AMPUTATE REVISION STUMP LOWER EXTREMITY;  Surgeon: Savannah Durant MD;  [facility]     AMPUTATE TOE(S) Right 1/5/2016    Procedure: AMPUTATE TOE(S);  Surgeon: Mello Gaines DPM;  Location: [facility]     ANGIOGRAM Bilateral 11/21/2014    Procedure: ANGIOGRAM;  Surgeon: Savannah Durant MD;  [facility]     ANGIOGRAM Left 1/16/2015    Procedure: ANGIOGRAM;  Surgeon: Savannah Durant MD;  [facility]     ANGIOGRAM Bilateral 9/14/2015    Procedure: ANGIOGRAM;  Surgeon: Savannah Durant MD;  [facility]     ANGIOGRAM Left 10/12/2015    Procedure: ANGIOGRAM;  Surgeon: Savannah Durant MD;  [facility]     ANGIOGRAM Right 6/6/2016    Procedure: ANGIOGRAM;  Surgeon: Savannah Durant MD;  [facility]     ANGIOPLASTY Right 6/6/2016    Procedure: ANGIOPLASTY;  Surgeon: Savannah Durant MD;  [facility]     APPENDECTOMY       BREAST BIOPSY, RT/LT Right     benign     CATARACT IOL, RT/LT Right      CHOLECYSTECTOMY       COLONOSCOPY N/A 8/25/2014    Procedure: COLONOSCOPY;  Surgeon: Mello Ferrer MD;  [facility]     COLONOSCOPY WITH CO2 INSUFFLATION N/A 8/20/2014    Procedure: COLONOSCOPY WITH CO2 INSUFFLATION;  Surgeon: Duane, William Charles, MD;  Location: [facility]     CYSTOSCOPY, INJECT BOTOX N/A 5/21/2020    Procedure: CYSTOSCOPY, WITH BOTULINUM TOXIN INJECTION;  Surgeon: Loki Gordon MD;  [facility]     CYSTOSCOPY, INTRAVESICAL INJECTION N/A 10/31/2019    Procedure: CYSTOSCOPY, BOTOX INJECTION, Suprapubic Catheter Exchange;  Surgeon: Loki Gordon MD;  [facility]     CYSTOSTOMY, INSERT TUBE SUPRAPUBIC, COMBINED N/A 1/16/2018    Procedure: COMBINED CYSTOSTOMY, INSERT TUBE SUPRAPUBIC;  Cystoscopy, Intraoperative Ultrasound,  Suprapubic Tube Placement;  Surgeon: Keanu Dawson MD;  [facility]     ENDARTERECTOMY FEMORAL  5/23/2014    Procedure: ENDARTERECTOMY FEMORAL;  Surgeon: Jason Joshi MD;  [facility]     ESOPHAGOSCOPY, GASTROSCOPY, DUODENOSCOPY (EGD), COMBINED  12/14/2012    Procedure: COMBINED ESOPHAGOSCOPY, GASTROSCOPY, DUODENOSCOPY (EGD), BIOPSY SINGLE OR MULTIPLE;  ESOPHAGOSCOPY, GASTROSCOPY, DUODENOSCOPY (EGD), DILATATION ;  Surgeon: Elizabeth Stevenson MD;  Location:  GI     ESOPHAGOSCOPY, GASTROSCOPY, DUODENOSCOPY (EGD), COMBINED  12/31/2013    Procedure: COMBINED ESOPHAGOSCOPY, GASTROSCOPY, DUODENOSCOPY (EGD), BIOPSY SINGLE OR MULTIPLE;;  Surgeon: Clemnete Lopez MD;  Location:  GI     ESOPHAGOSCOPY, GASTROSCOPY, DUODENOSCOPY (EGD), COMBINED  4/1/2014    Procedure: COMBINED ESOPHAGOSCOPY, GASTROSCOPY, DUODENOSCOPY (EGD);;  Surgeon: Clemente Lopez MD;  Location:  GI     ESOPHAGOSCOPY, GASTROSCOPY, DUODENOSCOPY (EGD), COMBINED  6/28/2014    Procedure: COMBINED ESOPHAGOSCOPY, GASTROSCOPY, DUODENOSCOPY (EGD);  Surgeon: Clemente Lopez MD;  Location:  GI     ESOPHAGOSCOPY, GASTROSCOPY, DUODENOSCOPY (EGD), COMBINED N/A 8/20/2014    Procedure: COMBINED ESOPHAGOSCOPY, GASTROSCOPY, DUODENOSCOPY (EGD), BIOPSY SINGLE OR MULTIPLE;  Surgeon: Duane, William Charles, MD;  Location:  OR     ESOPHAGOSCOPY, GASTROSCOPY, DUODENOSCOPY (EGD), COMBINED N/A 8/22/2014    Procedure: COMBINED ESOPHAGOSCOPY, GASTROSCOPY, DUODENOSCOPY (EGD), BIOPSY SINGLE OR MULTIPLE;  Surgeon: Mello Ferrer MD;  Location:  GI     ESOPHAGOSCOPY, GASTROSCOPY, DUODENOSCOPY (EGD), COMBINED N/A 10/2/2014    Procedure: COMBINED ESOPHAGOSCOPY, GASTROSCOPY, DUODENOSCOPY (EGD), BIOPSY SINGLE OR MULTIPLE;  Surgeon: Remy Haskins MD;  Location:  GI     ESOPHAGOSCOPY, GASTROSCOPY, DUODENOSCOPY (EGD), COMBINED Left 12/15/2014    Procedure: COMBINED ESOPHAGOSCOPY, GASTROSCOPY, DUODENOSCOPY (EGD), BIOPSY SINGLE OR MULTIPLE;   Surgeon: Remy Haskins MD;  [facility]     ESOPHAGOSCOPY, GASTROSCOPY, DUODENOSCOPY (EGD), COMBINED N/A 2/25/2015    Procedure: COMBINED ENDOSCOPIC ULTRASOUND, ESOPHAGOSCOPY, GASTROSCOPY, DUODENOSCOPY (EGD), FINE NEEDLE ASPIRATE/BIOPSY;  Surgeon: Clemente Lugo MD;  [facility]     ESOPHAGOSCOPY, GASTROSCOPY, DUODENOSCOPY (EGD), COMBINED Left 2/25/2015    Procedure: COMBINED ESOPHAGOSCOPY, GASTROSCOPY, DUODENOSCOPY (EGD), BIOPSY SINGLE OR MULTIPLE;  Surgeon: Clemente Lugo MD;  [facility]     ESOPHAGOSCOPY, GASTROSCOPY, DUODENOSCOPY (EGD), COMBINED N/A 9/25/2016    Procedure: COMBINED ESOPHAGOSCOPY, GASTROSCOPY, DUODENOSCOPY (EGD);  Surgeon: Aziza Patiño MD;  [facility]     ESOPHAGOSCOPY, GASTROSCOPY, DUODENOSCOPY (EGD), COMBINED N/A 1/18/2017    Procedure: COMBINED ESOPHAGOSCOPY, GASTROSCOPY, DUODENOSCOPY (EGD), BIOPSY SINGLE OR MULTIPLE;  Surgeon: Clemente Lopez MD;  [facility]     ESOPHAGOSCOPY, GASTROSCOPY, DUODENOSCOPY (EGD), COMBINED N/A 11/26/2017    Procedure: COMBINED ESOPHAGOSCOPY, GASTROSCOPY, DUODENOSCOPY (EGD), REMOVE FOREIGN BODY;  Esophagogastroduodenoscopy with foreign body extraction  ;  Surgeon: Herberth Castrejon MD;  [facility]     ESOPHAGOSCOPY, GASTROSCOPY, DUODENOSCOPY (EGD), COMBINED N/A 11/26/2017    Procedure: COMBINED ESOPHAGOSCOPY, GASTROSCOPY, DUODENOSCOPY (EGD), REMOVE FOREIGN BODY;;  Surgeon: Herberth Castrejon MD;  [facility]     ESOPHAGOSCOPY, GASTROSCOPY, DUODENOSCOPY (EGD), COMBINED N/A 4/10/2020    Procedure: ESOPHAGOGASTRODUODENOSCOPY (EGD);  Surgeon: Yael Cabral MD;  [facility]     FASCIOTOMY LOWER EXTREMITY Left 6/10/2016    Procedure: FASCIOTOMY LOWER EXTREMITY;  Surgeon: Mello Rodriguez MD;  [facility]     HC CAPSULE ENDOSCOPY N/A 8/25/2014    Procedure: CAPSULE/PILL CAM ENDOSCOPY;  Surgeon: Remy Haskins MD;  [facility]     HC CAPSULE ENDOSCOPY N/A 10/2/2014    Procedure:  CAPSULE/PILL CAM ENDOSCOPY;  Surgeon: Remy Haskins MD;  Location:  GI     ORTHOPEDIC SURGERY      broken wrist repair     SEX TRANSFORMATION SURGERY, MALE TO FEMALE  1974 1974     SINUS SURGERY      cyst removed     TONSILLECTOMY       VASCULAR SURGERY  2006    Left carotid stent       Social History     Tobacco Use     Smoking status: Current Every Day Smoker     Packs/day: 1.50     Years: 30.00     Pack years: 45.00     Types: Cigarettes, Cigars     Smokeless tobacco: Never Used     Tobacco comment: 1.5 packs per day    Substance Use Topics     Alcohol use: No     Alcohol/week: 0.0 standard drinks     Family History   Problem Relation Age of Onset     Dementia Mother      Diabetes Mother         type unknown     Coronary Artery Disease Mother         MI     Glaucoma Father      Diabetes Father         type unknown     Heart Failure Father      Schizophrenia Brother      Depression Brother      Suicide Sister      Depression Sister      Diabetes Sister      Ovarian Cancer Maternal Aunt      Leukemia Maternal Aunt      Diabetes Maternal Grandmother      Diabetes Maternal Grandfather      Diabetes Paternal Grandmother      Diabetes Paternal Grandfather      Breast Cancer Sister      Cerebrovascular Disease Brother      Colon Cancer No family hx of      Macular Degeneration No family hx of          Current Outpatient Medications   Medication Sig Dispense Refill     ADVAIR DISKUS 250-50 MCG/DOSE inhaler Inhale 1 puff into the lungs 2 times daily 60 Inhaler 11     albuterol (PROVENTIL) (2.5 MG/3ML) 0.083% neb solution INHALE 1 VIAL VIA NEBULIZER EVERY 6 HOURS AS NEEDED 360 mL 11     alendronate (FOSAMAX) 70 MG tablet Take 1 tablet (70 mg) by mouth with 8oz water every 7 days 30 minutes before breakfast and remain upright during this time. 12 tablet 3     aspirin EC 81 MG EC tablet Take 1 tablet (81 mg) by mouth daily 90 tablet 3     atorvastatin (LIPITOR) 40 MG tablet TAKE 1 TAB BY MOUTH ONCE DAILY 30  tablet 11     blood glucose monitoring (ONE TOUCH ULTRA 2) meter device kit Use to test blood sugars 3 times daily or as directed. 1 kit 0     blood glucose monitoring (ONE TOUCH ULTRA) test strip Use to test blood sugars 3 times daily or as directed. 3 Box 3     blood glucose monitoring (ONE TOUCH ULTRASOFT) lancets Use to test blood sugar 3 times daily or as directed. 100 each PRN     brimonidine (ALPHAGAN) 0.2 % ophthalmic solution Place 1 drop into the right eye 2 times daily 1 Bottle 11     capsaicin-menthol-methyl sal 0.025-1-12 % external cream Apply 1 Application topically daily as needed (topical pain) 56.6 g 1     diclofenac (VOLTAREN) 1 % GEL topical gel Apply 2 g topically 4 times daily to hands 100 g 11     dorzolamide (TRUSOPT) 2 % ophthalmic solution Place 1 drop into the right eye 2 times daily 1 Bottle 11     escitalopram (LEXAPRO) 10 MG tablet TAKE 1 TAB BY MOUTH ONCE DAILY 30 tablet 11     ferrous sulfate (IRON) 325 (65 FE) MG tablet Take 1 tablet (325 mg) by mouth 2 times daily With meals (Patient taking differently: Take 325 mg by mouth Twice daily every other day) 60 tablet 2     lactulose (CHRONULAC) 10 GM/15ML solution Take 30 mLs (20 g) by mouth as needed for constipation 236 mL 0     latanoprost (XALATAN) 0.005 % ophthalmic solution Place 1 drop into both eyes At Bedtime 2.5 mL 11     levETIRAcetam (KEPPRA) 500 MG tablet TAKE 1 TAB BY MOUTH TWICE A DAY 60 tablet 5     Lidocaine (LIDOCARE) 4 % Patch Place 1 patch onto the skin every 24 hours To prevent lidocaine toxicity, patient should be patch free for 12 hrs daily.       lisinopril (PRINIVIL/ZESTRIL) 20 MG tablet TAKE 1 TAB BY MOUTH ONCE DAILY 30 tablet 11     loratadine (CLARITIN) 10 MG tablet Take 1 tablet (10 mg) by mouth daily 30 tablet 1     metFORMIN (GLUCOPHAGE) 500 MG tablet TAKE 1 TAB BY MOUTH IN THE EVENING WITH DINNER 30 tablet 5     metoprolol succinate ER (TOPROL-XL) 50 MG 24 hr tablet TAKE 1 TAB BY MOUTH ONCE DAILY 30 tablet  11     multivitamin, therapeutic (THERA-VIT) TABS tablet Take 1 tablet by mouth daily       mupirocin (BACTROBAN) 2 % external ointment Apply topically daily Apply to Nares       nitroGLYcerin (NITROSTAT) 0.4 MG sublingual tablet DISSOLVE ONE TABLET UNDER TONGUE AS NEEDED FOR CHEST PAIN MAY REPEAT EVERY 5 MINUTES FOR 3 DOSES, IF SYMPTOMS PERSIST CALL 911 25 tablet 3     Nutritional Supplements (ENSURE NUTRITION SHAKE) LIQD Take 1 Bottle by mouth 2 times daily With meals 60 Bottle 0     pantoprazole (PROTONIX) 40 MG EC tablet TAKE 1 TAB BY MOUTH ONCE DAILY 30 tablet 11     phenytoin (DILANTIN) 100 MG capsule TAKE 2 CAPS (200MG) BY MOUTH TWICE A  capsule 11     polyethylene glycol (MIRALAX/GLYCOLAX) Packet Take 17 g by mouth daily Dissolved in water or juice        pregabalin (LYRICA) 150 MG capsule Take 150 mg by mouth 3 times daily        risperiDONE (RISPERDAL) 0.5 MG tablet TAKE 1 TAB BY MOUTH AT BEDTIME 30 tablet 5     senna-docusate (SENOKOT-S/PERICOLACE) 8.6-50 MG tablet Take 1 tablet by mouth 2 times daily       sennosides (SENOKOT) 8.6 MG tablet Take 1 tablet by mouth 2 times daily        solifenacin (VESICARE) 10 MG tablet TAKE 1 TAB BY MOUTH ONCE DAILY 30 tablet 11     traZODone (DESYREL) 150 MG tablet TAKE 1 TAB BY MOUTH AT BEDTIME 30 tablet 11     umeclidinium (INCRUSE ELLIPTA) 62.5 MCG/INH inhaler Inhale 1 puff into the lungs daily 1 Inhaler 6     vitamin D3 (CHOLECALCIFEROL) 10 MCG (400 UNIT) capsule Take 2 capsules by mouth daily       Allergies   Allergen Reactions     Bee Venom Anaphylaxis     Penicillins Anaphylaxis     Dilantin [Phenytoin] Other (See Comments)     Generic dilantin only per pt     Iodine Hives     Novocaine [Procaine] Hives     Tositumomab Unknown     BP Readings from Last 3 Encounters:   06/12/20 97/54   06/02/20 98/52   05/29/20 98/58    Wt Readings from Last 3 Encounters:   06/12/20 58.2 kg (128 lb 6.4 oz)   06/02/20 59 kg (130 lb)   05/29/20 59 kg (130 lb)     "       Reviewed and updated as needed this visit by Provider         Review of Systems   CONSTITUTIONAL: NEGATIVE for fever, chills, change in weight  ENT/MOUTH: NEGATIVE for ear, mouth and throat problems  RESP: NEGATIVE for significant cough or SOB  CV: NEGATIVE for chest pain, palpitations or peripheral edema  GI: NEGATIVE for nausea, abdominal pain, heartburn, or change in bowel habits  : NEGATIVE for frequency, dysuria, or hematuria  MUSCULOSKELETAL: NEGATIVE for significant arthralgias or myalgia  NEURO: NEGATIVE for weakness, dizziness or paresthesias  ENDOCRINE: NEGATIVE for temperature intolerance, skin/hair changes  HEME: NEGATIVE for bleeding problems  PSYCHIATRIC: NEGATIVE for changes in mood or affect      Objective    /64   Pulse 72   Temp 97.8  F (36.6  C) (Oral)   Resp 14   Ht 1.092 m (3' 7\")   Wt 56.7 kg (125 lb)   SpO2 100%   BMI 47.53 kg/m    Body mass index is 47.53 kg/m .  Physical Exam   GENERAL: alert and no distress  EYES: Eyes grossly normal to inspection, PERRL and conjunctivae and sclerae normal  HENT: ear canals and TM's normal, nose and mouth without ulcers or lesions  NECK: no adenopathy, no asymmetry, masses, or scars and thyroid normal to palpation  RESP: lungs clear to auscultation - no rales, rhonchi or wheezes  CV: regular rate and rhythm, normal S1 S2, no S3 or S4, no click or rub, no peripheral edema and peripheral pulses strong  MS: Wheelchair-bound bilateral AKA's and new prosthetic leg is in place  NEURO: No focal changes noted  PSYCH: mentation appears normal, affect normal/bright per baseline.      Component      Latest Ref Rng & Units 6/12/2020   Sodium      133 - 144 mmol/L 141   Potassium      3.4 - 5.3 mmol/L 3.8   Chloride      94 - 109 mmol/L 108   Carbon Dioxide      20 - 32 mmol/L 27   Anion Gap      3 - 14 mmol/L 6   Glucose      70 - 99 mg/dL 91   Urea Nitrogen      7 - 30 mg/dL 13   Creatinine      0.52 - 1.04 mg/dL 0.49 (L)   GFR Estimate      >60 " mL/min/1.73:m2 >90   GFR Estimate If Black      >60 mL/min/1.73:m2 >90   Calcium      8.5 - 10.1 mg/dL 8.5     Assessment & Plan     1. Hospital discharge follow-up  Stable and doing well and continuing to follow-up with pulmonary clinic tomorrow as scheduled for evaluation of dyspnea    2. Benign essential hypertension  Currently stable at goal on therapy    3. Urinary tract infection without hematuria, site unspecified  Resolved and treated with oral antibiotics as noted    4. S/P AKA (above knee amputation) bilateral (H)  It is my medical opinion that the patient requires the below listed hospital bed w/ side rails, shower bench, transfer pole and air mattress due to her prosthetic legs and above-the-knee amputations bilaterally and to decrease the risk of developing chronic compression sores and sacral ulcers.  This is required for lifetime.    Hospital bed is required due to the fact the positioning of her heels body is not feasible in an ordinary bed to alleviate pain.  Also use of a wheeled walker with use of her prosthetic legs will be helpful in the home to complete ADLs.  She appears to be able to function independently with this wheeled walker and this is considered to be superior to a cane or crutch.    - Hospital Bed Order for DME - ONLY FOR DME  - Miscellaneous Order for DME - ONLY FOR DME  - Walker Order for DME - ONLY FOR DME  - Miscellaneous Order for DME - ONLY FOR DME  - Miscellaneous Order for DME - ONLY FOR DME       See Patient Instructions    Return in about 1 month (around 7/23/2020) for if symptoms recur or worsen.    Allan Casey MD  [facility]

## 2020-06-23 ENCOUNTER — OFFICE VISIT (OUTPATIENT)
Dept: INTERNAL MEDICINE | Facility: CLINIC | Age: 70
End: 2020-06-23
Payer: COMMERCIAL

## 2020-06-23 VITALS
TEMPERATURE: 97.8 F | BODY MASS INDEX: 28.93 KG/M2 | HEART RATE: 72 BPM | OXYGEN SATURATION: 100 % | DIASTOLIC BLOOD PRESSURE: 64 MMHG | HEIGHT: 55 IN | WEIGHT: 125 LBS | RESPIRATION RATE: 14 BRPM | SYSTOLIC BLOOD PRESSURE: 112 MMHG

## 2020-06-23 DIAGNOSIS — Z89.612 S/P AKA (ABOVE KNEE AMPUTATION) BILATERAL (H): ICD-10-CM

## 2020-06-23 DIAGNOSIS — N39.0 URINARY TRACT INFECTION WITHOUT HEMATURIA, SITE UNSPECIFIED: ICD-10-CM

## 2020-06-23 DIAGNOSIS — Z89.611 S/P AKA (ABOVE KNEE AMPUTATION) BILATERAL (H): ICD-10-CM

## 2020-06-23 DIAGNOSIS — I10 BENIGN ESSENTIAL HYPERTENSION: ICD-10-CM

## 2020-06-23 DIAGNOSIS — Z09 HOSPITAL DISCHARGE FOLLOW-UP: Primary | ICD-10-CM

## 2020-06-23 PROCEDURE — 99214 OFFICE O/P EST MOD 30 MIN: CPT | Performed by: INTERNAL MEDICINE

## 2020-06-23 ASSESSMENT — MIFFLIN-ST. JEOR: SCORE: 733.63

## 2020-06-24 ENCOUNTER — VIRTUAL VISIT (OUTPATIENT)
Dept: PULMONOLOGY | Facility: CLINIC | Age: 70
End: 2020-06-24
Attending: INTERNAL MEDICINE
Payer: COMMERCIAL

## 2020-06-24 DIAGNOSIS — G62.89 OTHER POLYNEUROPATHY: ICD-10-CM

## 2020-06-24 DIAGNOSIS — Z72.0 CURRENT TOBACCO USE: Primary | ICD-10-CM

## 2020-06-24 RX ORDER — NICOTINE 21 MG/24HR
1 PATCH, TRANSDERMAL 24 HOURS TRANSDERMAL EVERY 24 HOURS
Qty: 30 PATCH | Refills: 11 | Status: SHIPPED | OUTPATIENT
Start: 2020-06-24 | End: 2020-11-25

## 2020-06-24 RX ORDER — POLYETHYLENE GLYCOL 3350 17 G
2 POWDER IN PACKET (EA) ORAL
Qty: 60 LOZENGE | Refills: 11 | Status: ON HOLD | OUTPATIENT
Start: 2020-06-24 | End: 2020-12-26

## 2020-06-24 NOTE — LETTER
6/24/2020         RE: Sonya Foote  [address]  [facility] [address]        Dear Colleague,    Thank you for referring your patient, Sonya Foote, to the [facility]. Please see a copy of my visit note below.    Sonya Foote is a 70 year old female who is being evaluated via a billable telephone visit.        Phone call duration: 40 minutes    Dulce Yeager MD    Pulmonary Clinic Follow-Up Visit Note    Reason for visit: Chronic bronchitis follow up    HPI/Interval History:   69-year-old female with past medical history of coronary artery disease status post MI, peripheral arterial disease status post bilateral AKA, CVA x3, blindness and history of chronic cough was seen for follow-up for chronic bronchitis.    Patient was previously seen by my colleague Dr. Dewey in 2019 and this is the first visit for me.      Pt is reports that her chest is hurting when she is moving. She has prosthetic legs and as she is learning to walk her chest starts to hurt. This has been going on for 6M. She is not using oxygen. She lives at a facility where she gets her medications and food. Her sons gets her cig.     She was admitted to hospital in June 2020 for UTI and dyspnea. CTA shows no PE. Thickened esophagus, with hiatal hernia, bibasilar atelectasis. Thoracic kyphosis.   Cough + foamy white. Also c/o back pain which prevents her from sleeping. She is going for a stimulator.     PT has h/o dysphagia.     She remains on Advair, Incruse and albuterol nebs.  Not sure how much she is helped by these.     Social History:  Previous 75-pack-year history  Has restarted smoking, not using 10 cigarettes/day since April  Previously worked as a  for mental health currently retired  Lives in assisted living facility in which they help with her medications    Medications:  Current Outpatient Medications   Medication Sig Dispense Refill     ADVAIR DISKUS 250-50 MCG/DOSE inhaler Inhale 1  puff into the lungs 2 times daily 60 Inhaler 11     albuterol (PROVENTIL) (2.5 MG/3ML) 0.083% neb solution INHALE 1 VIAL VIA NEBULIZER EVERY 6 HOURS AS NEEDED 360 mL 11     alendronate (FOSAMAX) 70 MG tablet Take 1 tablet (70 mg) by mouth with 8oz water every 7 days 30 minutes before breakfast and remain upright during this time. 12 tablet 3     aspirin EC 81 MG EC tablet Take 1 tablet (81 mg) by mouth daily 90 tablet 3     atorvastatin (LIPITOR) 40 MG tablet TAKE 1 TAB BY MOUTH ONCE DAILY 30 tablet 11     blood glucose monitoring (ONE TOUCH ULTRA 2) meter device kit Use to test blood sugars 3 times daily or as directed. 1 kit 0     blood glucose monitoring (ONE TOUCH ULTRA) test strip Use to test blood sugars 3 times daily or as directed. 3 Box 3     blood glucose monitoring (ONE TOUCH ULTRASOFT) lancets Use to test blood sugar 3 times daily or as directed. 100 each PRN     brimonidine (ALPHAGAN) 0.2 % ophthalmic solution Place 1 drop into the right eye 2 times daily 1 Bottle 11     capsaicin-menthol-methyl sal 0.025-1-12 % external cream Apply 1 Application topically daily as needed (topical pain) 56.6 g 1     diclofenac (VOLTAREN) 1 % GEL topical gel Apply 2 g topically 4 times daily to hands 100 g 11     dorzolamide (TRUSOPT) 2 % ophthalmic solution Place 1 drop into the right eye 2 times daily 1 Bottle 11     escitalopram (LEXAPRO) 10 MG tablet TAKE 1 TAB BY MOUTH ONCE DAILY 30 tablet 11     ferrous sulfate (IRON) 325 (65 FE) MG tablet Take 1 tablet (325 mg) by mouth 2 times daily With meals (Patient taking differently: Take 325 mg by mouth Twice daily every other day) 60 tablet 2     lactulose (CHRONULAC) 10 GM/15ML solution Take 30 mLs (20 g) by mouth as needed for constipation 236 mL 0     latanoprost (XALATAN) 0.005 % ophthalmic solution Place 1 drop into both eyes At Bedtime 2.5 mL 11     levETIRAcetam (KEPPRA) 500 MG tablet TAKE 1 TAB BY MOUTH TWICE A DAY 60 tablet 5     Lidocaine (LIDOCARE) 4 % Patch  Place 1 patch onto the skin every 24 hours To prevent lidocaine toxicity, patient should be patch free for 12 hrs daily.       lisinopril (PRINIVIL/ZESTRIL) 20 MG tablet TAKE 1 TAB BY MOUTH ONCE DAILY 30 tablet 11     loratadine (CLARITIN) 10 MG tablet Take 1 tablet (10 mg) by mouth daily 30 tablet 1     metFORMIN (GLUCOPHAGE) 500 MG tablet TAKE 1 TAB BY MOUTH IN THE EVENING WITH DINNER 30 tablet 5     metoprolol succinate ER (TOPROL-XL) 50 MG 24 hr tablet TAKE 1 TAB BY MOUTH ONCE DAILY 30 tablet 11     multivitamin, therapeutic (THERA-VIT) TABS tablet Take 1 tablet by mouth daily       nitroGLYcerin (NITROSTAT) 0.4 MG sublingual tablet DISSOLVE ONE TABLET UNDER TONGUE AS NEEDED FOR CHEST PAIN MAY REPEAT EVERY 5 MINUTES FOR 3 DOSES, IF SYMPTOMS PERSIST CALL 911 25 tablet 3     Nutritional Supplements (ENSURE NUTRITION SHAKE) LIQD Take 1 Bottle by mouth 2 times daily With meals 60 Bottle 0     pantoprazole (PROTONIX) 40 MG EC tablet TAKE 1 TAB BY MOUTH ONCE DAILY 30 tablet 11     phenytoin (DILANTIN) 100 MG capsule TAKE 2 CAPS (200MG) BY MOUTH TWICE A  capsule 11     polyethylene glycol (MIRALAX/GLYCOLAX) Packet Take 17 g by mouth daily Dissolved in water or juice        pregabalin (LYRICA) 150 MG capsule Take 150 mg by mouth 3 times daily        risperiDONE (RISPERDAL) 0.5 MG tablet TAKE 1 TAB BY MOUTH AT BEDTIME 30 tablet 5     senna-docusate (SENOKOT-S/PERICOLACE) 8.6-50 MG tablet Take 1 tablet by mouth 2 times daily       sennosides (SENOKOT) 8.6 MG tablet Take 1 tablet by mouth 2 times daily        solifenacin (VESICARE) 10 MG tablet TAKE 1 TAB BY MOUTH ONCE DAILY 30 tablet 11     traZODone (DESYREL) 150 MG tablet TAKE 1 TAB BY MOUTH AT BEDTIME 30 tablet 11     umeclidinium (INCRUSE ELLIPTA) 62.5 MCG/INH inhaler Inhale 1 puff into the lungs daily 1 Inhaler 6     vitamin D3 (CHOLECALCIFEROL) 10 MCG (400 UNIT) capsule Take 2 capsules by mouth daily         Labs and Radiology:  No new labs or imaging      PFT's:  Pulmonary function tests from 2015 shows normal spirometry, normal lung volumes and mildly reduced diffusion capacity    Assessment and Plan:  Sonya Foote is a 69-year-old female with past medical history of coronary artery disease status post MI, peripheral arterial disease status post bilateral AKA, CVA x3, blindness and history of chronic cough was seen for follow-up for chronic bronchitis.    IMPRESSION:     Chronic Cough and Dyspnea 2/2 CHF  No obstruction on PFT in 2015  CHF systolic   Active smoker   JOSE on CPAP      Will refer to palliative care.     Will get repeat full PFT on next visit.     Continue cardiology follow up for optimizing her CHF    Her cough appears to be related to her CHF. She does not have COPD.     Will do overnight oxymetry.     We will get 6MWT once she can walk ~300 feet. She walks with prosthesis and uses a walker to do like  feet.     Re-start on NRT. Nictone patch 14 mg (10 cig/d) and lozenges prn.     This was virtual visit over phone. Case d/w staff, Dr. Feliz.   RTC in 6-8 W. Can see Dr. Devin Yeager MD  Pulmonary Critical Care Fellow       Attending note:  Discussed on telephone with patient and Dr. Yeager.  All patient's questions were answered.  I agree with the assessment and plan as outlined in the above note.      Terri Feliz MD  [phone]                      Again, thank you for allowing me to participate in the care of your patient.        Sincerely,        Dulce Yeager MD

## 2020-06-24 NOTE — PROGRESS NOTES
"Sonya Foote is a 70 year old female who is being evaluated via a billable telephone visit.      The patient has been notified of following:     \"This telephone visit will be conducted via a call between you and your physician/provider. We have found that certain health care needs can be provided without the need for a physical exam.  This service lets us provide the care you need with a short phone conversation.  If a prescription is necessary we can send it directly to your pharmacy.  If lab work is needed we can place an order for that and you can then stop by our lab to have the test done at a later time.    Telephone visits are billed at different rates depending on your insurance coverage. During this emergency period, for some insurers they may be billed the same as an in-person visit.  Please reach out to your insurance provider with any questions.    If during the course of the call the physician/provider feels a telephone visit is not appropriate, you will not be charged for this service.\"    Patient has given verbal consent for Telephone visit?  Yes    What phone number would you like to be contacted at? [phone]    How would you like to obtain your AVS? Mail a copy    Phone call duration: 40 minutes    Dulce Yeager MD    Pulmonary Clinic Follow-Up Visit Note    Reason for visit: Chronic bronchitis follow up    HPI/Interval History:   69-year-old female with past medical history of coronary artery disease status post MI, peripheral arterial disease status post bilateral AKA, CVA x3, blindness and history of chronic cough was seen for follow-up for chronic bronchitis.    Patient was previously seen by my colleague Dr. Dewey in 2019 and this is the first visit for me.      Pt is reports that her chest is hurting when she is moving. She has prosthetic legs and as she is learning to walk her chest starts to hurt. This has been going on for 6M. She is not using oxygen. She lives at a facility where " she gets her medications and food. Her sons gets her cig.     She was admitted to hospital in June 2020 for UTI and dyspnea. CTA shows no PE. Thickened esophagus, with hiatal hernia, bibasilar atelectasis. Thoracic kyphosis.   Cough + foamy white. Also c/o back pain which prevents her from sleeping. She is going for a stimulator.     PT has h/o dysphagia.     She remains on Advair, Incruse and albuterol nebs.  Not sure how much she is helped by these.     Social History:  Previous 75-pack-year history  Has restarted smoking, not using 10 cigarettes/day since April  Previously worked as a  for mental health currently retired  Lives in assisted living facility in which they help with her medications    Medications:  Current Outpatient Medications   Medication Sig Dispense Refill     ADVAIR DISKUS 250-50 MCG/DOSE inhaler Inhale 1 puff into the lungs 2 times daily 60 Inhaler 11     albuterol (PROVENTIL) (2.5 MG/3ML) 0.083% neb solution INHALE 1 VIAL VIA NEBULIZER EVERY 6 HOURS AS NEEDED 360 mL 11     alendronate (FOSAMAX) 70 MG tablet Take 1 tablet (70 mg) by mouth with 8oz water every 7 days 30 minutes before breakfast and remain upright during this time. 12 tablet 3     aspirin EC 81 MG EC tablet Take 1 tablet (81 mg) by mouth daily 90 tablet 3     atorvastatin (LIPITOR) 40 MG tablet TAKE 1 TAB BY MOUTH ONCE DAILY 30 tablet 11     blood glucose monitoring (ONE TOUCH ULTRA 2) meter device kit Use to test blood sugars 3 times daily or as directed. 1 kit 0     blood glucose monitoring (ONE TOUCH ULTRA) test strip Use to test blood sugars 3 times daily or as directed. 3 Box 3     blood glucose monitoring (ONE TOUCH ULTRASOFT) lancets Use to test blood sugar 3 times daily or as directed. 100 each PRN     brimonidine (ALPHAGAN) 0.2 % ophthalmic solution Place 1 drop into the right eye 2 times daily 1 Bottle 11     capsaicin-menthol-methyl sal 0.025-1-12 % external cream Apply 1 Application topically daily as  needed (topical pain) 56.6 g 1     diclofenac (VOLTAREN) 1 % GEL topical gel Apply 2 g topically 4 times daily to hands 100 g 11     dorzolamide (TRUSOPT) 2 % ophthalmic solution Place 1 drop into the right eye 2 times daily 1 Bottle 11     escitalopram (LEXAPRO) 10 MG tablet TAKE 1 TAB BY MOUTH ONCE DAILY 30 tablet 11     ferrous sulfate (IRON) 325 (65 FE) MG tablet Take 1 tablet (325 mg) by mouth 2 times daily With meals (Patient taking differently: Take 325 mg by mouth Twice daily every other day) 60 tablet 2     lactulose (CHRONULAC) 10 GM/15ML solution Take 30 mLs (20 g) by mouth as needed for constipation 236 mL 0     latanoprost (XALATAN) 0.005 % ophthalmic solution Place 1 drop into both eyes At Bedtime 2.5 mL 11     levETIRAcetam (KEPPRA) 500 MG tablet TAKE 1 TAB BY MOUTH TWICE A DAY 60 tablet 5     Lidocaine (LIDOCARE) 4 % Patch Place 1 patch onto the skin every 24 hours To prevent lidocaine toxicity, patient should be patch free for 12 hrs daily.       lisinopril (PRINIVIL/ZESTRIL) 20 MG tablet TAKE 1 TAB BY MOUTH ONCE DAILY 30 tablet 11     loratadine (CLARITIN) 10 MG tablet Take 1 tablet (10 mg) by mouth daily 30 tablet 1     metFORMIN (GLUCOPHAGE) 500 MG tablet TAKE 1 TAB BY MOUTH IN THE EVENING WITH DINNER 30 tablet 5     metoprolol succinate ER (TOPROL-XL) 50 MG 24 hr tablet TAKE 1 TAB BY MOUTH ONCE DAILY 30 tablet 11     multivitamin, therapeutic (THERA-VIT) TABS tablet Take 1 tablet by mouth daily       nitroGLYcerin (NITROSTAT) 0.4 MG sublingual tablet DISSOLVE ONE TABLET UNDER TONGUE AS NEEDED FOR CHEST PAIN MAY REPEAT EVERY 5 MINUTES FOR 3 DOSES, IF SYMPTOMS PERSIST CALL 911 25 tablet 3     Nutritional Supplements (ENSURE NUTRITION SHAKE) LIQD Take 1 Bottle by mouth 2 times daily With meals 60 Bottle 0     pantoprazole (PROTONIX) 40 MG EC tablet TAKE 1 TAB BY MOUTH ONCE DAILY 30 tablet 11     phenytoin (DILANTIN) 100 MG capsule TAKE 2 CAPS (200MG) BY MOUTH TWICE A  capsule 11      polyethylene glycol (MIRALAX/GLYCOLAX) Packet Take 17 g by mouth daily Dissolved in water or juice        pregabalin (LYRICA) 150 MG capsule Take 150 mg by mouth 3 times daily        risperiDONE (RISPERDAL) 0.5 MG tablet TAKE 1 TAB BY MOUTH AT BEDTIME 30 tablet 5     senna-docusate (SENOKOT-S/PERICOLACE) 8.6-50 MG tablet Take 1 tablet by mouth 2 times daily       sennosides (SENOKOT) 8.6 MG tablet Take 1 tablet by mouth 2 times daily        solifenacin (VESICARE) 10 MG tablet TAKE 1 TAB BY MOUTH ONCE DAILY 30 tablet 11     traZODone (DESYREL) 150 MG tablet TAKE 1 TAB BY MOUTH AT BEDTIME 30 tablet 11     umeclidinium (INCRUSE ELLIPTA) 62.5 MCG/INH inhaler Inhale 1 puff into the lungs daily 1 Inhaler 6     vitamin D3 (CHOLECALCIFEROL) 10 MCG (400 UNIT) capsule Take 2 capsules by mouth daily         Labs and Radiology:  No new labs or imaging     PFT's:  Pulmonary function tests from 2015 shows normal spirometry, normal lung volumes and mildly reduced diffusion capacity    Assessment and Plan:  Sonya Foote is a 69-year-old female with past medical history of coronary artery disease status post MI, peripheral arterial disease status post bilateral AKA, CVA x3, blindness and history of chronic cough was seen for follow-up for chronic bronchitis.    IMPRESSION:     Chronic Cough and Dyspnea 2/2 CHF  No obstruction on PFT in 2015  CHF systolic   Active smoker   JOSE on CPAP      Will refer to palliative care.     Will get repeat full PFT on next visit.     Continue cardiology follow up for optimizing her CHF    Her cough appears to be related to her CHF. She does not have COPD.     Will do overnight oxymetry.     We will get 6MWT once she can walk ~300 feet. She walks with prosthesis and uses a walker to do like  feet.     Re-start on NRT. Nictone patch 14 mg (10 cig/d) and lozenges prn.     This was virtual visit over phone. Case d/w staff, Dr. Feliz.   RTC in 6-8 W. Can see Dr. Devin Yeager   MD  Pulmonary Critical Care Fellow       Attending note:  Discussed on telephone with patient and Dr. Yeager.  All patient's questions were answered.  I agree with the assessment and plan as outlined in the above note.      Terri Feliz MD  [phone]

## 2020-06-28 ENCOUNTER — MEDICAL CORRESPONDENCE (OUTPATIENT)
Dept: HEALTH INFORMATION MANAGEMENT | Facility: CLINIC | Age: 70
End: 2020-06-28

## 2020-07-01 ENCOUNTER — OFFICE VISIT (OUTPATIENT)
Dept: ANESTHESIOLOGY | Facility: CLINIC | Age: 70
End: 2020-07-01
Payer: COMMERCIAL

## 2020-07-01 VITALS
HEIGHT: 55 IN | SYSTOLIC BLOOD PRESSURE: 114 MMHG | DIASTOLIC BLOOD PRESSURE: 66 MMHG | BODY MASS INDEX: 47.53 KG/M2 | HEART RATE: 58 BPM | RESPIRATION RATE: 16 BRPM

## 2020-07-01 DIAGNOSIS — M47.812 CERVICAL SPONDYLOSIS: Primary | ICD-10-CM

## 2020-07-01 ASSESSMENT — PAIN SCALES - GENERAL: PAINLEVEL: EXTREME PAIN (8)

## 2020-07-01 NOTE — LETTER
"7/1/2020       RE: Sonya Foote  [address]  [place] [address]     Dear Colleague,    Thank you for referring your patient, Sonya Foote, to the [facility] FOR COMPREHENSIVE PAIN MANAGEMENT at [facility]. Please see a copy of my visit note below.                          [facility] Consultation    Date of visit: 7/1/2020    Reason for consultation:    Sonya Foote is a 70 year old female who is seen in consultation today at her own request.    Primary Care Provider is Allan Casey  Pain medications are being prescribed by PCP.    Please see the [facility] [place] health questionnaire which the patient completed and reviewed with me in detail.    Chief Complaint:    No chief complaint on file.      Pain history:  Sonya Foote is a 70 year old female who first started having problems with pain in the hands, shoulder, and neck on both sides. She also notes headaches . She has history of degenerative disk disease and has an intrathecal pain pump for 5 to 6 years. She states that she has fentanyl and morphine in the pump.  She has had the shoulder and arm pain since before the pump was implanted.  Both sides are equally painful. She states that any light touch is painful. She also notes a headache that has been present for the same duration. The headaches are in the back of the head, equal on both sides and feels like pressure. All of this pain was present prior to IT Pump implantation. She wakes up with the pain in her upper extremities and neck and the pain remains the same throughout the day. She feels the pain in all of her fingers. The fingers feel like \"has a vice on\" with pressure and tightness. The arms feel like sharp and dull with pain. Laying down with a pillow helps.     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 Amputation was 4 years ago    Pain rating: intensity ranges from 7/10 to 9/10, and " Averages 8/10 on a 0-10 scale.  Aggravating factors include: moving around   Relieving factors include: nothing, staying still and avoiding movement  Any bowel or bladder incontinence: denies    Current treatments include:  Pregabalin 150 mg TID  IT Pump with fentanyl and Morphine  Tylenol     Previous medication treatments included:  Cymbalta - did not help    Other treatments have included:  Sonya Foote has been seen at a pain clinic in the past.  [facility] for pain pump management for phantom limb pain  PT: no  Acupuncture: no  TENs Unit: no  Injections: no    Past Medical History:  Past Medical History:   Diagnosis Date     Acid reflux disease      Amputation above knee (H)     bilateral     Benign essential hypertension      Blind left eye     due to central retinal artery occlusion     CAD (coronary artery disease)     UA and inferior MI in 2016 s/p PCI     Chronic back pain      Chronic neck pain      Chronic pain syndrome     with fentanyl intrathecal pain pump     Complex sleep apnea syndrome      COPD (chronic obstructive pulmonary disease) (H)      Dysphagia     chronic due to esophageal strictures and multiple previous dilitations      ROGER (generalized anxiety disorder)      History of blood transfusion     x5; no adverse reactions     History of central retinal artery occlusion 2006    left-sided     History of stroke     residual left-sided weakness     Hx of carotid artery stenosis     s/p left carotid stent in 2006 and balloon angioplasty in 2016     MDD (major depressive disorder)      Neurogenic bladder     SPT in place     JOSE (obstructive sleep apnea)      Osteoporosis      PAD (peripheral artery disease) (H)      Peripheral neuropathy      Person who has had sex change operation     male to female     Seizure disorder (H)     many years since last grand mal; daily, brief petit mals     Sickle cell trait (H)      Type 2 diabetes mellitus (H)      Patient Active Problem List    Diagnosis Date Noted      Chest pain on breathing 06/10/2020     Priority: Medium     Neurogenic bladder 05/21/2020     Priority: Medium     Morbid obesity (H) 05/14/2020     Priority: Medium     History of blood transfusion      Priority: Medium     x5; no adverse reactions       COPD (chronic obstructive pulmonary disease) (H)      Priority: Medium     Type 2 diabetes mellitus (H)      Priority: Medium     Benign essential hypertension      Priority: Medium     History of stroke      Priority: Medium     residual left-sided weakness       Sickle cell trait (H)      Priority: Medium     MDD (major depressive disorder)      Priority: Medium     Seizure disorder (H)      Priority: Medium     many years since last grand mal; daily, brief petit mals       Chronic back pain      Priority: Medium     Chronic neck pain      Priority: Medium     ROGER (generalized anxiety disorder)      Priority: Medium     Person who has had sex change operation      Priority: Medium     male to female       Osteoporosis      Priority: Medium     PAD (peripheral artery disease) (H)      Priority: Medium     Peripheral neuropathy      Priority: Medium     JOSE (obstructive sleep apnea)      Priority: Medium     Amputation above knee (H)      Priority: Medium     bilateral       Blind left eye      Priority: Medium     due to central retinal artery occlusion       Acid reflux disease      Priority: Medium     Hx of carotid artery stenosis      Priority: Medium     s/p left carotid stent in 2006       Complex sleep apnea syndrome      Priority: Medium     Dysphagia      Priority: Medium     chronic due to esophageal strictures and multiple previous dilitations        CAD (coronary artery disease)      Priority: Medium     UA and inferior MI in 2016 s/p PCI       Chronic pain syndrome 03/20/2009     Priority: Medium     Has a fentanyl PUMP    Patient is followed by Allan Casey for ongoing prescription of pain meds  All refills should be approved by this provider, or  covering partner.    Maximum quantity per month: 1 month  Clinic visit frequency required: Q 6  months     Controlled substance agreement:  Encounter-Level CSA:     There are no encounter-level csa.        Pain Clinic evaluation in the past: No    DIRE Total Score(s):  No flowsheet data found.    Last [facility] website verification:  6/6/18   [email or web address]       History of central retinal artery occlusion 2006     Priority: Medium     left-sided         Past Surgical History:  Past Surgical History:   Procedure Laterality Date     AMPUTATE LEG ABOVE KNEE Left 6/11/2016    Procedure: AMPUTATE LEG ABOVE KNEE;  Surgeon: Mello Rodriguez MD;  [facility]     AMPUTATE LEG BELOW KNEE Right 11/7/2016    Procedure: AMPUTATE LEG BELOW KNEE;  Surgeon: Savannah Durant MD;  [facility]     AMPUTATE REVISION STUMP LOWER EXTREMITY Right 11/11/2016    Procedure: AMPUTATE REVISION STUMP LOWER EXTREMITY;  Surgeon: Savannah Durant MD;  [facility]     AMPUTATE REVISION STUMP LOWER EXTREMITY Right 11/16/2016    Procedure: AMPUTATE REVISION STUMP LOWER EXTREMITY;  Surgeon: Savannah Durant MD;  [facility]     AMPUTATE TOE(S) Right 1/5/2016    Procedure: AMPUTATE TOE(S);  Surgeon: Mello Gaines DPM;  Location:  [facility]     ANGIOGRAM Bilateral 11/21/2014    Procedure: ANGIOGRAM;  Surgeon: Savannah Durant MD;  [facility]     ANGIOGRAM Left 1/16/2015    Procedure: ANGIOGRAM;  Surgeon: Savannah Durant MD;  [facility]     ANGIOGRAM Bilateral 9/14/2015    Procedure: ANGIOGRAM;  Surgeon: Savannah Durant MD;  [facility]     ANGIOGRAM Left 10/12/2015    Procedure: ANGIOGRAM;  Surgeon: Savannah Durant MD;  [facility]     ANGIOGRAM Right 6/6/2016    Procedure: ANGIOGRAM;  Surgeon: Savannah Durant MD;  [facility]     ANGIOPLASTY Right 6/6/2016    Procedure: ANGIOPLASTY;  Surgeon: Savannah Durant MD;  [facility]     APPENDECTOMY       BREAST BIOPSY, RT/LT Right     benign     CATARACT IOL, RT/LT Right       CHOLECYSTECTOMY       COLONOSCOPY N/A 8/25/2014    Procedure: COLONOSCOPY;  Surgeon: Mello Ferrer MD;  [facility]     COLONOSCOPY WITH CO2 INSUFFLATION N/A 8/20/2014    Procedure: COLONOSCOPY WITH CO2 INSUFFLATION;  Surgeon: Duane, William Charles, MD;  Location: [facility]     CYSTOSCOPY, INJECT BOTOX N/A 5/21/2020    Procedure: CYSTOSCOPY, WITH BOTULINUM TOXIN INJECTION;  Surgeon: Loki Gordon MD;  [facility]     CYSTOSCOPY, INTRAVESICAL INJECTION N/A 10/31/2019    Procedure: CYSTOSCOPY, BOTOX INJECTION, Suprapubic Catheter Exchange;  Surgeon: Loki Gordon MD;  [facility]     CYSTOSTOMY, INSERT TUBE SUPRAPUBIC, COMBINED N/A 1/16/2018    Procedure: COMBINED CYSTOSTOMY, INSERT TUBE SUPRAPUBIC;  Cystoscopy, Intraoperative Ultrasound, Suprapubic Tube Placement;  Surgeon: Keanu Dawson MD;  [facility]     ENDARTERECTOMY FEMORAL  5/23/2014    Procedure: ENDARTERECTOMY FEMORAL;  Surgeon: Jason Joshi MD;  [facility]     ESOPHAGOSCOPY, GASTROSCOPY, DUODENOSCOPY (EGD), COMBINED  12/14/2012    Procedure: COMBINED ESOPHAGOSCOPY, GASTROSCOPY, DUODENOSCOPY (EGD), BIOPSY SINGLE OR MULTIPLE;  ESOPHAGOSCOPY, GASTROSCOPY, DUODENOSCOPY (EGD), DILATATION ;  Surgeon: Elizabeth Stevenson MD;  Location:  GI     ESOPHAGOSCOPY, GASTROSCOPY, DUODENOSCOPY (EGD), COMBINED  12/31/2013    Procedure: COMBINED ESOPHAGOSCOPY, GASTROSCOPY, DUODENOSCOPY (EGD), BIOPSY SINGLE OR MULTIPLE;;  Surgeon: Clemente Lopez MD;  [facility]     ESOPHAGOSCOPY, GASTROSCOPY, DUODENOSCOPY (EGD), COMBINED  4/1/2014    Procedure: COMBINED ESOPHAGOSCOPY, GASTROSCOPY, DUODENOSCOPY (EGD);;  Surgeon: Clemente Lopez MD;  [facility]     ESOPHAGOSCOPY, GASTROSCOPY, DUODENOSCOPY (EGD), COMBINED  6/28/2014    Procedure: COMBINED ESOPHAGOSCOPY, GASTROSCOPY, DUODENOSCOPY (EGD);  Surgeon: Clemente Lopez MD;  [facility]     ESOPHAGOSCOPY, GASTROSCOPY, DUODENOSCOPY (EGD), COMBINED N/A  8/20/2014    Procedure: COMBINED ESOPHAGOSCOPY, GASTROSCOPY, DUODENOSCOPY (EGD), BIOPSY SINGLE OR MULTIPLE;  Surgeon: Duane, William Charles, MD;  Location:  OR     ESOPHAGOSCOPY, GASTROSCOPY, DUODENOSCOPY (EGD), COMBINED N/A 8/22/2014    Procedure: COMBINED ESOPHAGOSCOPY, GASTROSCOPY, DUODENOSCOPY (EGD), BIOPSY SINGLE OR MULTIPLE;  Surgeon: Mello Ferrer MD;  [facility]     ESOPHAGOSCOPY, GASTROSCOPY, DUODENOSCOPY (EGD), COMBINED N/A 10/2/2014    Procedure: COMBINED ESOPHAGOSCOPY, GASTROSCOPY, DUODENOSCOPY (EGD), BIOPSY SINGLE OR MULTIPLE;  Surgeon: Remy Haskins MD;  [facility]     ESOPHAGOSCOPY, GASTROSCOPY, DUODENOSCOPY (EGD), COMBINED Left 12/15/2014    Procedure: COMBINED ESOPHAGOSCOPY, GASTROSCOPY, DUODENOSCOPY (EGD), BIOPSY SINGLE OR MULTIPLE;  Surgeon: Remy Haskins MD;  [facility]     ESOPHAGOSCOPY, GASTROSCOPY, DUODENOSCOPY (EGD), COMBINED N/A 2/25/2015    Procedure: COMBINED ENDOSCOPIC ULTRASOUND, ESOPHAGOSCOPY, GASTROSCOPY, DUODENOSCOPY (EGD), FINE NEEDLE ASPIRATE/BIOPSY;  Surgeon: Clemente Lugo MD;  [facility]     ESOPHAGOSCOPY, GASTROSCOPY, DUODENOSCOPY (EGD), COMBINED Left 2/25/2015    Procedure: COMBINED ESOPHAGOSCOPY, GASTROSCOPY, DUODENOSCOPY (EGD), BIOPSY SINGLE OR MULTIPLE;  Surgeon: Clemente Lugo MD;  [facility]     ESOPHAGOSCOPY, GASTROSCOPY, DUODENOSCOPY (EGD), COMBINED N/A 9/25/2016    Procedure: COMBINED ESOPHAGOSCOPY, GASTROSCOPY, DUODENOSCOPY (EGD);  Surgeon: Aziza Patiño MD;  [facility]     ESOPHAGOSCOPY, GASTROSCOPY, DUODENOSCOPY (EGD), COMBINED N/A 1/18/2017    Procedure: COMBINED ESOPHAGOSCOPY, GASTROSCOPY, DUODENOSCOPY (EGD), BIOPSY SINGLE OR MULTIPLE;  Surgeon: Clemente Lopez MD;  [facility]     ESOPHAGOSCOPY, GASTROSCOPY, DUODENOSCOPY (EGD), COMBINED N/A 11/26/2017    Procedure: COMBINED ESOPHAGOSCOPY, GASTROSCOPY, DUODENOSCOPY (EGD), REMOVE FOREIGN BODY;  Esophagogastroduodenoscopy with foreign body  extraction  ;  Surgeon: Herberth Castrejon MD;  [facility]     ESOPHAGOSCOPY, GASTROSCOPY, DUODENOSCOPY (EGD), COMBINED N/A 11/26/2017    Procedure: COMBINED ESOPHAGOSCOPY, GASTROSCOPY, DUODENOSCOPY (EGD), REMOVE FOREIGN BODY;;  Surgeon: Herberth Castrejon MD;  [facility]     ESOPHAGOSCOPY, GASTROSCOPY, DUODENOSCOPY (EGD), COMBINED N/A 4/10/2020    Procedure: ESOPHAGOGASTRODUODENOSCOPY (EGD);  Surgeon: Yael Cabral MD;  [facility]     FASCIOTOMY LOWER EXTREMITY Left 6/10/2016    Procedure: FASCIOTOMY LOWER EXTREMITY;  Surgeon: Mello Rodriguez MD;  [facility]     HC CAPSULE ENDOSCOPY N/A 8/25/2014    Procedure: CAPSULE/PILL CAM ENDOSCOPY;  Surgeon: Remy Haskins MD;  [facility]     HC CAPSULE ENDOSCOPY N/A 10/2/2014    Procedure: CAPSULE/PILL CAM ENDOSCOPY;  Surgeon: Remy Haskins MD;  [facility]     ORTHOPEDIC SURGERY      broken wrist repair     SEX TRANSFORMATION SURGERY, MALE TO FEMALE  1974 1974     SINUS SURGERY      cyst removed     TONSILLECTOMY       VASCULAR SURGERY  2006    Left carotid stent     Medications:  Current Outpatient Medications   Medication Sig Dispense Refill     ADVAIR DISKUS 250-50 MCG/DOSE inhaler Inhale 1 puff into the lungs 2 times daily 60 Inhaler 11     albuterol (PROVENTIL) (2.5 MG/3ML) 0.083% neb solution INHALE 1 VIAL VIA NEBULIZER EVERY 6 HOURS AS NEEDED 360 mL 11     alendronate (FOSAMAX) 70 MG tablet Take 1 tablet (70 mg) by mouth with 8oz water every 7 days 30 minutes before breakfast and remain upright during this time. 12 tablet 3     aspirin EC 81 MG EC tablet Take 1 tablet (81 mg) by mouth daily 90 tablet 3     atorvastatin (LIPITOR) 40 MG tablet TAKE 1 TAB BY MOUTH ONCE DAILY 30 tablet 11     blood glucose monitoring (ONE TOUCH ULTRA 2) meter device kit Use to test blood sugars 3 times daily or as directed. 1 kit 0     blood glucose monitoring (ONE TOUCH ULTRA) test strip Use to test blood sugars 3 times daily  or as directed. 3 Box 3     blood glucose monitoring (ONE TOUCH ULTRASOFT) lancets Use to test blood sugar 3 times daily or as directed. 100 each PRN     brimonidine (ALPHAGAN) 0.2 % ophthalmic solution Place 1 drop into the right eye 2 times daily 1 Bottle 11     capsaicin-menthol-methyl sal 0.025-1-12 % external cream Apply 1 Application topically daily as needed (topical pain) 56.6 g 1     diclofenac (VOLTAREN) 1 % GEL topical gel Apply 2 g topically 4 times daily to hands 100 g 11     dorzolamide (TRUSOPT) 2 % ophthalmic solution Place 1 drop into the right eye 2 times daily 1 Bottle 11     escitalopram (LEXAPRO) 10 MG tablet TAKE 1 TAB BY MOUTH ONCE DAILY 30 tablet 11     ferrous sulfate (IRON) 325 (65 FE) MG tablet Take 1 tablet (325 mg) by mouth 2 times daily With meals (Patient taking differently: Take 325 mg by mouth Twice daily every other day) 60 tablet 2     lactulose (CHRONULAC) 10 GM/15ML solution Take 30 mLs (20 g) by mouth as needed for constipation 236 mL 0     latanoprost (XALATAN) 0.005 % ophthalmic solution Place 1 drop into both eyes At Bedtime 2.5 mL 11     levETIRAcetam (KEPPRA) 500 MG tablet TAKE 1 TAB BY MOUTH TWICE A DAY 60 tablet 5     Lidocaine (LIDOCARE) 4 % Patch Place 1 patch onto the skin every 24 hours To prevent lidocaine toxicity, patient should be patch free for 12 hrs daily.       lisinopril (PRINIVIL/ZESTRIL) 20 MG tablet TAKE 1 TAB BY MOUTH ONCE DAILY 30 tablet 11     loratadine (CLARITIN) 10 MG tablet Take 1 tablet (10 mg) by mouth daily 30 tablet 1     metFORMIN (GLUCOPHAGE) 500 MG tablet TAKE 1 TAB BY MOUTH IN THE EVENING WITH DINNER 30 tablet 5     metoprolol succinate ER (TOPROL-XL) 50 MG 24 hr tablet TAKE 1 TAB BY MOUTH ONCE DAILY 30 tablet 11     multivitamin, therapeutic (THERA-VIT) TABS tablet Take 1 tablet by mouth daily       nicotine (COMMIT) 2 MG lozenge Place 1 lozenge (2 mg) inside cheek every hour as needed for smoking cessation 60 lozenge 11     nicotine  (NICODERM CQ) 14 MG/24HR 24 hr patch Place 1 patch onto the skin every 24 hours 30 patch 11     nitroGLYcerin (NITROSTAT) 0.4 MG sublingual tablet DISSOLVE ONE TABLET UNDER TONGUE AS NEEDED FOR CHEST PAIN MAY REPEAT EVERY 5 MINUTES FOR 3 DOSES, IF SYMPTOMS PERSIST CALL 911 25 tablet 3     Nutritional Supplements (ENSURE NUTRITION SHAKE) LIQD Take 1 Bottle by mouth 2 times daily With meals 60 Bottle 0     pantoprazole (PROTONIX) 40 MG EC tablet TAKE 1 TAB BY MOUTH ONCE DAILY 30 tablet 11     phenytoin (DILANTIN) 100 MG capsule TAKE 2 CAPS (200MG) BY MOUTH TWICE A  capsule 11     polyethylene glycol (MIRALAX/GLYCOLAX) Packet Take 17 g by mouth daily Dissolved in water or juice        pregabalin (LYRICA) 150 MG capsule Take 150 mg by mouth 3 times daily        risperiDONE (RISPERDAL) 0.5 MG tablet TAKE 1 TAB BY MOUTH AT BEDTIME 30 tablet 5     senna-docusate (SENOKOT-S/PERICOLACE) 8.6-50 MG tablet Take 1 tablet by mouth 2 times daily       sennosides (SENOKOT) 8.6 MG tablet Take 1 tablet by mouth 2 times daily        solifenacin (VESICARE) 10 MG tablet TAKE 1 TAB BY MOUTH ONCE DAILY 30 tablet 11     traZODone (DESYREL) 150 MG tablet TAKE 1 TAB BY MOUTH AT BEDTIME 30 tablet 11     umeclidinium (INCRUSE ELLIPTA) 62.5 MCG/INH inhaler Inhale 1 puff into the lungs daily 1 Inhaler 6     vitamin D3 (CHOLECALCIFEROL) 10 MCG (400 UNIT) capsule Take 2 capsules by mouth daily       Allergies:     Allergies   Allergen Reactions     Bee Venom Anaphylaxis     Penicillins Anaphylaxis     Dilantin [Phenytoin] Other (See Comments)     Generic dilantin only per pt     Iodine Hives     Novocaine [Procaine] Hives     Tositumomab Unknown     Social History:  Home situation: lives in a nursing home, moving to assisted living tomorrow  Occupation/Schooling: disabled  Tobacco use: current smoker  Alcohol use: denies  Drug use: denies  History of chemical dependency treatment: denies    Family history:  Family History   Problem Relation  "Age of Onset     Dementia Mother      Diabetes Mother         type unknown     Coronary Artery Disease Mother         MI     Glaucoma Father      Diabetes Father         type unknown     Heart Failure Father      Schizophrenia Brother      Depression Brother      Suicide Sister      Depression Sister      Diabetes Sister      Ovarian Cancer Maternal Aunt      Leukemia Maternal Aunt      Diabetes Maternal Grandmother      Diabetes Maternal Grandfather      Diabetes Paternal Grandmother      Diabetes Paternal Grandfather      Breast Cancer Sister      Cerebrovascular Disease Brother      Colon Cancer No family hx of      Macular Degeneration No family hx of      Family history of headaches: sister, she also has fibromyalgia    Review of Systems:    POSTIVE IN BOLD  GENERAL: fever/chills, fatigue, general unwell feeling, weight gain/loss.  HEAD/EYES:  headache, dizziness, or vision changes.    EARS/NOSE/THROAT:  Nosebleeds, hearing loss, sinus infection, earache, tinnitus.  IMMUNE:  Allergies, cancer, immune deficiency, or infections.  SKIN:  Urticaria, rash, hives  HEME/Lymphatic:   anemia, easy bruising, easy bleeding.  RESPIRATORY:  cough, wheezing, or shortness of breath  CARDIOVASCULAR/Circulation:  Extremity edema, syncope, hypertension, tachycardia, or angina.  GASTROINTESTINAL:  abdominal pain, nausea/emesis, diarrhea, constipation,  hematochezia, or melena.  ENDOCRINE:  Diabetes, steroid use,  thyroid disease or osteoporosis.  MUSCULOSKELETAL: neck pain, back pain, arthralgia, arthritis, or gout.  GENITOURINARY:  frequency, urgency, dysuria, difficulty voiding, hematuria or incontinence.  NEUROLOGIC:  weakness, numbness, paresthesias, seizure, tremor, stroke or memory loss.  PSYCHIATRIC:  depression, anxiety, stress, suicidal thoughts or mood swings.     Physical Exam:  Vitals:    07/01/20 1033   BP: 114/66   Pulse: 58   Resp: 16   Height: 1.092 m (3' 7\")     Exam: (Virtual Visit)  Constitutional: alert and no " distress  Head: Appears normocephalic, atraumatic.   Respiratory: nonlabored breathing with conversatoin  Psychiatric: mentation appears normal and affect normal      Diagnostic tests:  MRI of lumbar spine was completed on 6/17/2019 showing:    See Epic Results Tab    Personally reviewed imaging       [place] Prescription Monitoring Program reviewed    Outside records reviewed        Assessment:  1. Fibromyalgia  2. Chronic Pain Syndrome  3. Chronic Tension Type Headaches    Sonya Foote is a 70 year old female who presents with the complaints of chronic tension type headaches as well as previous diagnosis of fibromyalgia. She currently has a contract with another pain clinic for management of medications. We discussed performing imaging in order to consider if a procedure for cervical spondylosis and cervicogenic headaches would be appropriate. Based on MRI results we will discuss potential cervical MBBs bilaterally.     Plan:  Diagnosis reviewed, treatment option addressed, and risk/benefits discussed.  Self-care instructions given.  I am recommending a multidisciplinary treatment plan to help this patient better manage her pain.      1. Physical Therapy: Referral to Orthology for Pain PT  2. Pain Psychologist to address issues of relaxation, behavioral change, coping style, and other factors important to improvement: patient not interested at this time  3. Diagnostic Studies: Cervical MRI  4. Medication Management:    Recommend she discuss with her other pain clinic about increasing her Lyrica  dose to help with fibromyalgia  5. Further procedures recommended: none   6. Recommendations/follow-up for PCP:  Recommend she discuss with her other pain clinic about increasing her Lyrica dose to help with fibromyalgia  7. Follow up: 3 to 6 months or as needed    Total time spent was 40 minutes, and more than 50% of time was spent in counseling and/or coordination of care regarding principles of multidisciplinary care,  medication management, and therapeutic options.    Evelyn Lima MD    Pain Medicine  Department of Anesthesiology  [facility]          LPN reviewed AVS with Pt.  Pt verbalized an understanding of information, and was asked to contact clinic with questions.    Follow up recommended by provider: 3 months or as needed.     Mary Singer LPN

## 2020-07-01 NOTE — PROGRESS NOTES
"                      [facility] Consultation    Date of visit: 7/1/2020    Reason for consultation:    Sonya Foote is a 70 year old female who is seen in consultation today at her own request.    Primary Care Provider is Allan Casey  Pain medications are being prescribed by PCP.    Please see the [facility] health questionnaire which the patient completed and reviewed with me in detail.    Chief Complaint:    No chief complaint on file.      Pain history:  Sonya Foote is a 70 year old female who first started having problems with pain in the hands, shoulder, and neck on both sides. She also notes headaches . She has history of degenerative disk disease and has an intrathecal pain pump for 5 to 6 years. She states that she has fentanyl and morphine in the pump.  She has had the shoulder and arm pain since before the pump was implanted.  Both sides are equally painful. She states that any light touch is painful. She also notes a headache that has been present for the same duration. The headaches are in the back of the head, equal on both sides and feels like pressure. All of this pain was present prior to IT Pump implantation. She wakes up with the pain in her upper extremities and neck and the pain remains the same throughout the day. She feels the pain in all of her fingers. The fingers feel like \"has a vice on\" with pressure and tightness. The arms feel like sharp and dull with pain. Laying down with a pillow helps.     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 Amputation was 4 years ago    Pain rating: intensity ranges from 7/10 to 9/10, and Averages 8/10 on a 0-10 scale.  Aggravating factors include: moving around   Relieving factors include: nothing, staying still and avoiding movement  Any bowel or bladder incontinence: denies    Current treatments include:  Pregabalin 150 mg TID  IT Pump with fentanyl and Morphine  Tylenol     Previous " medication treatments included:  Cymbalta - did not help    Other treatments have included:  Sonya Foote has been seen at a pain clinic in the past.  [facility] for pain pump management for phantom limb pain  PT: no  Acupuncture: no  TENs Unit: no  Injections: no    Past Medical History:  Past Medical History:   Diagnosis Date     Acid reflux disease      Amputation above knee (H)     bilateral     Benign essential hypertension      Blind left eye     due to central retinal artery occlusion     CAD (coronary artery disease)     UA and inferior MI in 2016 s/p PCI     Chronic back pain      Chronic neck pain      Chronic pain syndrome     with fentanyl intrathecal pain pump     Complex sleep apnea syndrome      COPD (chronic obstructive pulmonary disease) (H)      Dysphagia     chronic due to esophageal strictures and multiple previous dilitations      ROGER (generalized anxiety disorder)      History of blood transfusion     x5; no adverse reactions     History of central retinal artery occlusion 2006    left-sided     History of stroke     residual left-sided weakness     Hx of carotid artery stenosis     s/p left carotid stent in 2006 and balloon angioplasty in 2016     MDD (major depressive disorder)      Neurogenic bladder     SPT in place     JOSE (obstructive sleep apnea)      Osteoporosis      PAD (peripheral artery disease) (H)      Peripheral neuropathy      Person who has had sex change operation     male to female     Seizure disorder (H)     many years since last grand mal; daily, brief petit mals     Sickle cell trait (H)      Type 2 diabetes mellitus (H)      Patient Active Problem List    Diagnosis Date Noted     Chest pain on breathing 06/10/2020     Priority: Medium     Neurogenic bladder 05/21/2020     Priority: Medium     Morbid obesity (H) 05/14/2020     Priority: Medium     History of blood transfusion      Priority: Medium     x5; no adverse reactions       COPD (chronic obstructive pulmonary disease) (H)       Priority: Medium     Type 2 diabetes mellitus (H)      Priority: Medium     Benign essential hypertension      Priority: Medium     History of stroke      Priority: Medium     residual left-sided weakness       Sickle cell trait (H)      Priority: Medium     MDD (major depressive disorder)      Priority: Medium     Seizure disorder (H)      Priority: Medium     many years since last grand mal; daily, brief petit mals       Chronic back pain      Priority: Medium     Chronic neck pain      Priority: Medium     ROGER (generalized anxiety disorder)      Priority: Medium     Person who has had sex change operation      Priority: Medium     male to female       Osteoporosis      Priority: Medium     PAD (peripheral artery disease) (H)      Priority: Medium     Peripheral neuropathy      Priority: Medium     JOSE (obstructive sleep apnea)      Priority: Medium     Amputation above knee (H)      Priority: Medium     bilateral       Blind left eye      Priority: Medium     due to central retinal artery occlusion       Acid reflux disease      Priority: Medium     Hx of carotid artery stenosis      Priority: Medium     s/p left carotid stent in 2006       Complex sleep apnea syndrome      Priority: Medium     Dysphagia      Priority: Medium     chronic due to esophageal strictures and multiple previous dilitations        CAD (coronary artery disease)      Priority: Medium     UA and inferior MI in 2016 s/p PCI       Chronic pain syndrome 03/20/2009     Priority: Medium     Has a fentanyl PUMP    Patient is followed by lAlan Casey for ongoing prescription of pain meds  All refills should be approved by this provider, or covering partner.    Maximum quantity per month: 1 month  Clinic visit frequency required: Q 6  months     Controlled substance agreement:  Encounter-Level CSA:     There are no encounter-level csa.        Pain Clinic evaluation in the past: No    DIRE Total Score(s):  No flowsheet data found.    Last  [facility] website verification:  6/6/18   [email or web address]       History of central retinal artery occlusion 2006     Priority: Medium     left-sided         Past Surgical History:  Past Surgical History:   Procedure Laterality Date     AMPUTATE LEG ABOVE KNEE Left 6/11/2016    Procedure: AMPUTATE LEG ABOVE KNEE;  Surgeon: Mello Rodriguez MD;  [facility]     AMPUTATE LEG BELOW KNEE Right 11/7/2016    Procedure: AMPUTATE LEG BELOW KNEE;  Surgeon: Savannah Durant MD;  [facility]     AMPUTATE REVISION STUMP LOWER EXTREMITY Right 11/11/2016    Procedure: AMPUTATE REVISION STUMP LOWER EXTREMITY;  Surgeon: Savannah Durant MD;  [facility]     AMPUTATE REVISION STUMP LOWER EXTREMITY Right 11/16/2016    Procedure: AMPUTATE REVISION STUMP LOWER EXTREMITY;  Surgeon: Savannah Durant MD;  [facility]     AMPUTATE TOE(S) Right 1/5/2016    Procedure: AMPUTATE TOE(S);  Surgeon: Mello Gaines DPM;  Location: [facility]     ANGIOGRAM Bilateral 11/21/2014    Procedure: ANGIOGRAM;  Surgeon: Savannah Durant MD;  [facility]     ANGIOGRAM Left 1/16/2015    Procedure: ANGIOGRAM;  Surgeon: Savannah Durant MD;  [facility]     ANGIOGRAM Bilateral 9/14/2015    Procedure: ANGIOGRAM;  Surgeon: Savannah Durant MD;  [facility]     ANGIOGRAM Left 10/12/2015    Procedure: ANGIOGRAM;  Surgeon: Savannah Durant MD;  [facility]     ANGIOGRAM Right 6/6/2016    Procedure: ANGIOGRAM;  Surgeon: Savannah Durant MD;  [facility]     ANGIOPLASTY Right 6/6/2016    Procedure: ANGIOPLASTY;  Surgeon: Savannah Durant MD;  [facility]     APPENDECTOMY       BREAST BIOPSY, RT/LT Right     benign     CATARACT IOL, RT/LT Right      CHOLECYSTECTOMY       COLONOSCOPY N/A 8/25/2014    Procedure: COLONOSCOPY;  Surgeon: Mello Ferrer MD;  [facility]     COLONOSCOPY WITH CO2 INSUFFLATION N/A 8/20/2014    Procedure: COLONOSCOPY WITH CO2 INSUFFLATION;  Surgeon: Duane, William Charles, MD;  Location: [facility]     CYSTOSCOPY, INJECT  BOTOX N/A 5/21/2020    Procedure: CYSTOSCOPY, WITH BOTULINUM TOXIN INJECTION;  Surgeon: Loki Gordon MD;  [facility]     CYSTOSCOPY, INTRAVESICAL INJECTION N/A 10/31/2019    Procedure: CYSTOSCOPY, BOTOX INJECTION, Suprapubic Catheter Exchange;  Surgeon: Loki Gordon MD;  [facility]     CYSTOSTOMY, INSERT TUBE SUPRAPUBIC, COMBINED N/A 1/16/2018    Procedure: COMBINED CYSTOSTOMY, INSERT TUBE SUPRAPUBIC;  Cystoscopy, Intraoperative Ultrasound, Suprapubic Tube Placement;  Surgeon: Keanu Dawson MD;  [facility]     ENDARTERECTOMY FEMORAL  5/23/2014    Procedure: ENDARTERECTOMY FEMORAL;  Surgeon: Jason Joshi MD;  [facility]     ESOPHAGOSCOPY, GASTROSCOPY, DUODENOSCOPY (EGD), COMBINED  12/14/2012    Procedure: COMBINED ESOPHAGOSCOPY, GASTROSCOPY, DUODENOSCOPY (EGD), BIOPSY SINGLE OR MULTIPLE;  ESOPHAGOSCOPY, GASTROSCOPY, DUODENOSCOPY (EGD), DILATATION ;  Surgeon: Elizabeth Stevenson MD;  Location:  GI     ESOPHAGOSCOPY, GASTROSCOPY, DUODENOSCOPY (EGD), COMBINED  12/31/2013    Procedure: COMBINED ESOPHAGOSCOPY, GASTROSCOPY, DUODENOSCOPY (EGD), BIOPSY SINGLE OR MULTIPLE;;  Surgeon: Clemente Lopez MD;  Location:  GI     ESOPHAGOSCOPY, GASTROSCOPY, DUODENOSCOPY (EGD), COMBINED  4/1/2014    Procedure: COMBINED ESOPHAGOSCOPY, GASTROSCOPY, DUODENOSCOPY (EGD);;  Surgeon: Clemente Lopez MD;  Location:  GI     ESOPHAGOSCOPY, GASTROSCOPY, DUODENOSCOPY (EGD), COMBINED  6/28/2014    Procedure: COMBINED ESOPHAGOSCOPY, GASTROSCOPY, DUODENOSCOPY (EGD);  Surgeon: Clemente Lopez MD;  Location:  GI     ESOPHAGOSCOPY, GASTROSCOPY, DUODENOSCOPY (EGD), COMBINED N/A 8/20/2014    Procedure: COMBINED ESOPHAGOSCOPY, GASTROSCOPY, DUODENOSCOPY (EGD), BIOPSY SINGLE OR MULTIPLE;  Surgeon: Duane, William Charles, MD;  Location:  OR     ESOPHAGOSCOPY, GASTROSCOPY, DUODENOSCOPY (EGD), COMBINED N/A 8/22/2014    Procedure: COMBINED ESOPHAGOSCOPY, GASTROSCOPY, DUODENOSCOPY (EGD),  BIOPSY SINGLE OR MULTIPLE;  Surgeon: Mello Ferrer MD;  [facility]     ESOPHAGOSCOPY, GASTROSCOPY, DUODENOSCOPY (EGD), COMBINED N/A 10/2/2014    Procedure: COMBINED ESOPHAGOSCOPY, GASTROSCOPY, DUODENOSCOPY (EGD), BIOPSY SINGLE OR MULTIPLE;  Surgeon: Remy Haskins MD;  [facility]     ESOPHAGOSCOPY, GASTROSCOPY, DUODENOSCOPY (EGD), COMBINED Left 12/15/2014    Procedure: COMBINED ESOPHAGOSCOPY, GASTROSCOPY, DUODENOSCOPY (EGD), BIOPSY SINGLE OR MULTIPLE;  Surgeon: Remy Haskins MD;  [facility]     ESOPHAGOSCOPY, GASTROSCOPY, DUODENOSCOPY (EGD), COMBINED N/A 2/25/2015    Procedure: COMBINED ENDOSCOPIC ULTRASOUND, ESOPHAGOSCOPY, GASTROSCOPY, DUODENOSCOPY (EGD), FINE NEEDLE ASPIRATE/BIOPSY;  Surgeon: Clemente Lugo MD;  [facility]     ESOPHAGOSCOPY, GASTROSCOPY, DUODENOSCOPY (EGD), COMBINED Left 2/25/2015    Procedure: COMBINED ESOPHAGOSCOPY, GASTROSCOPY, DUODENOSCOPY (EGD), BIOPSY SINGLE OR MULTIPLE;  Surgeon: Clemente Lugo MD;  [facility]     ESOPHAGOSCOPY, GASTROSCOPY, DUODENOSCOPY (EGD), COMBINED N/A 9/25/2016    Procedure: COMBINED ESOPHAGOSCOPY, GASTROSCOPY, DUODENOSCOPY (EGD);  Surgeon: Aziza Patiño MD;  [facility]     ESOPHAGOSCOPY, GASTROSCOPY, DUODENOSCOPY (EGD), COMBINED N/A 1/18/2017    Procedure: COMBINED ESOPHAGOSCOPY, GASTROSCOPY, DUODENOSCOPY (EGD), BIOPSY SINGLE OR MULTIPLE;  Surgeon: Clemente Lopez MD;  [facility]     ESOPHAGOSCOPY, GASTROSCOPY, DUODENOSCOPY (EGD), COMBINED N/A 11/26/2017    Procedure: COMBINED ESOPHAGOSCOPY, GASTROSCOPY, DUODENOSCOPY (EGD), REMOVE FOREIGN BODY;  Esophagogastroduodenoscopy with foreign body extraction  ;  Surgeon: Herberth Castrejon MD;  [facility]     ESOPHAGOSCOPY, GASTROSCOPY, DUODENOSCOPY (EGD), COMBINED N/A 11/26/2017    Procedure: COMBINED ESOPHAGOSCOPY, GASTROSCOPY, DUODENOSCOPY (EGD), REMOVE FOREIGN BODY;;  Surgeon: Herberth Castrejon MD;  Location:  GI     ESOPHAGOSCOPY,  GASTROSCOPY, DUODENOSCOPY (EGD), COMBINED N/A 4/10/2020    Procedure: ESOPHAGOGASTRODUODENOSCOPY (EGD);  Surgeon: Yael Cabral MD;  [facility]     FASCIOTOMY LOWER EXTREMITY Left 6/10/2016    Procedure: FASCIOTOMY LOWER EXTREMITY;  Surgeon: Mello Rodriguez MD;  [facility]     HC CAPSULE ENDOSCOPY N/A 8/25/2014    Procedure: CAPSULE/PILL CAM ENDOSCOPY;  Surgeon: Remy Haskins MD;  [facility]     HC CAPSULE ENDOSCOPY N/A 10/2/2014    Procedure: CAPSULE/PILL CAM ENDOSCOPY;  Surgeon: Remy Haskins MD;  [facility]     ORTHOPEDIC SURGERY      broken wrist repair     SEX TRANSFORMATION SURGERY, MALE TO FEMALE  1974 1974     SINUS SURGERY      cyst removed     TONSILLECTOMY       VASCULAR SURGERY  2006    Left carotid stent     Medications:  Current Outpatient Medications   Medication Sig Dispense Refill     ADVAIR DISKUS 250-50 MCG/DOSE inhaler Inhale 1 puff into the lungs 2 times daily 60 Inhaler 11     albuterol (PROVENTIL) (2.5 MG/3ML) 0.083% neb solution INHALE 1 VIAL VIA NEBULIZER EVERY 6 HOURS AS NEEDED 360 mL 11     alendronate (FOSAMAX) 70 MG tablet Take 1 tablet (70 mg) by mouth with 8oz water every 7 days 30 minutes before breakfast and remain upright during this time. 12 tablet 3     aspirin EC 81 MG EC tablet Take 1 tablet (81 mg) by mouth daily 90 tablet 3     atorvastatin (LIPITOR) 40 MG tablet TAKE 1 TAB BY MOUTH ONCE DAILY 30 tablet 11     blood glucose monitoring (ONE TOUCH ULTRA 2) meter device kit Use to test blood sugars 3 times daily or as directed. 1 kit 0     blood glucose monitoring (ONE TOUCH ULTRA) test strip Use to test blood sugars 3 times daily or as directed. 3 Box 3     blood glucose monitoring (ONE TOUCH ULTRASOFT) lancets Use to test blood sugar 3 times daily or as directed. 100 each PRN     brimonidine (ALPHAGAN) 0.2 % ophthalmic solution Place 1 drop into the right eye 2 times daily 1 Bottle 11     capsaicin-menthol-methyl sal 0.025-1-12 %  external cream Apply 1 Application topically daily as needed (topical pain) 56.6 g 1     diclofenac (VOLTAREN) 1 % GEL topical gel Apply 2 g topically 4 times daily to hands 100 g 11     dorzolamide (TRUSOPT) 2 % ophthalmic solution Place 1 drop into the right eye 2 times daily 1 Bottle 11     escitalopram (LEXAPRO) 10 MG tablet TAKE 1 TAB BY MOUTH ONCE DAILY 30 tablet 11     ferrous sulfate (IRON) 325 (65 FE) MG tablet Take 1 tablet (325 mg) by mouth 2 times daily With meals (Patient taking differently: Take 325 mg by mouth Twice daily every other day) 60 tablet 2     lactulose (CHRONULAC) 10 GM/15ML solution Take 30 mLs (20 g) by mouth as needed for constipation 236 mL 0     latanoprost (XALATAN) 0.005 % ophthalmic solution Place 1 drop into both eyes At Bedtime 2.5 mL 11     levETIRAcetam (KEPPRA) 500 MG tablet TAKE 1 TAB BY MOUTH TWICE A DAY 60 tablet 5     Lidocaine (LIDOCARE) 4 % Patch Place 1 patch onto the skin every 24 hours To prevent lidocaine toxicity, patient should be patch free for 12 hrs daily.       lisinopril (PRINIVIL/ZESTRIL) 20 MG tablet TAKE 1 TAB BY MOUTH ONCE DAILY 30 tablet 11     loratadine (CLARITIN) 10 MG tablet Take 1 tablet (10 mg) by mouth daily 30 tablet 1     metFORMIN (GLUCOPHAGE) 500 MG tablet TAKE 1 TAB BY MOUTH IN THE EVENING WITH DINNER 30 tablet 5     metoprolol succinate ER (TOPROL-XL) 50 MG 24 hr tablet TAKE 1 TAB BY MOUTH ONCE DAILY 30 tablet 11     multivitamin, therapeutic (THERA-VIT) TABS tablet Take 1 tablet by mouth daily       nicotine (COMMIT) 2 MG lozenge Place 1 lozenge (2 mg) inside cheek every hour as needed for smoking cessation 60 lozenge 11     nicotine (NICODERM CQ) 14 MG/24HR 24 hr patch Place 1 patch onto the skin every 24 hours 30 patch 11     nitroGLYcerin (NITROSTAT) 0.4 MG sublingual tablet DISSOLVE ONE TABLET UNDER TONGUE AS NEEDED FOR CHEST PAIN MAY REPEAT EVERY 5 MINUTES FOR 3 DOSES, IF SYMPTOMS PERSIST CALL 911 25 tablet 3     Nutritional Supplements  (ENSURE NUTRITION SHAKE) LIQD Take 1 Bottle by mouth 2 times daily With meals 60 Bottle 0     pantoprazole (PROTONIX) 40 MG EC tablet TAKE 1 TAB BY MOUTH ONCE DAILY 30 tablet 11     phenytoin (DILANTIN) 100 MG capsule TAKE 2 CAPS (200MG) BY MOUTH TWICE A  capsule 11     polyethylene glycol (MIRALAX/GLYCOLAX) Packet Take 17 g by mouth daily Dissolved in water or juice        pregabalin (LYRICA) 150 MG capsule Take 150 mg by mouth 3 times daily        risperiDONE (RISPERDAL) 0.5 MG tablet TAKE 1 TAB BY MOUTH AT BEDTIME 30 tablet 5     senna-docusate (SENOKOT-S/PERICOLACE) 8.6-50 MG tablet Take 1 tablet by mouth 2 times daily       sennosides (SENOKOT) 8.6 MG tablet Take 1 tablet by mouth 2 times daily        solifenacin (VESICARE) 10 MG tablet TAKE 1 TAB BY MOUTH ONCE DAILY 30 tablet 11     traZODone (DESYREL) 150 MG tablet TAKE 1 TAB BY MOUTH AT BEDTIME 30 tablet 11     umeclidinium (INCRUSE ELLIPTA) 62.5 MCG/INH inhaler Inhale 1 puff into the lungs daily 1 Inhaler 6     vitamin D3 (CHOLECALCIFEROL) 10 MCG (400 UNIT) capsule Take 2 capsules by mouth daily       Allergies:     Allergies   Allergen Reactions     Bee Venom Anaphylaxis     Penicillins Anaphylaxis     Dilantin [Phenytoin] Other (See Comments)     Generic dilantin only per pt     Iodine Hives     Novocaine [Procaine] Hives     Tositumomab Unknown     Social History:  Home situation: lives in a nursing home, moving to assisted living tomorrow  Occupation/Schooling: disabled  Tobacco use: current smoker  Alcohol use: denies  Drug use: denies  History of chemical dependency treatment: denies    Family history:  Family History   Problem Relation Age of Onset     Dementia Mother      Diabetes Mother         type unknown     Coronary Artery Disease Mother         MI     Glaucoma Father      Diabetes Father         type unknown     Heart Failure Father      Schizophrenia Brother      Depression Brother      Suicide Sister      Depression Sister       "Diabetes Sister      Ovarian Cancer Maternal Aunt      Leukemia Maternal Aunt      Diabetes Maternal Grandmother      Diabetes Maternal Grandfather      Diabetes Paternal Grandmother      Diabetes Paternal Grandfather      Breast Cancer Sister      Cerebrovascular Disease Brother      Colon Cancer No family hx of      Macular Degeneration No family hx of      Family history of headaches: sister, she also has fibromyalgia    Review of Systems:    POSTIVE IN BOLD  GENERAL: fever/chills, fatigue, general unwell feeling, weight gain/loss.  HEAD/EYES:  headache, dizziness, or vision changes.    EARS/NOSE/THROAT:  Nosebleeds, hearing loss, sinus infection, earache, tinnitus.  IMMUNE:  Allergies, cancer, immune deficiency, or infections.  SKIN:  Urticaria, rash, hives  HEME/Lymphatic:   anemia, easy bruising, easy bleeding.  RESPIRATORY:  cough, wheezing, or shortness of breath  CARDIOVASCULAR/Circulation:  Extremity edema, syncope, hypertension, tachycardia, or angina.  GASTROINTESTINAL:  abdominal pain, nausea/emesis, diarrhea, constipation,  hematochezia, or melena.  ENDOCRINE:  Diabetes, steroid use,  thyroid disease or osteoporosis.  MUSCULOSKELETAL: neck pain, back pain, arthralgia, arthritis, or gout.  GENITOURINARY:  frequency, urgency, dysuria, difficulty voiding, hematuria or incontinence.  NEUROLOGIC:  weakness, numbness, paresthesias, seizure, tremor, stroke or memory loss.  PSYCHIATRIC:  depression, anxiety, stress, suicidal thoughts or mood swings.     Physical Exam:  Vitals:    07/01/20 1033   BP: 114/66   Pulse: 58   Resp: 16   Height: 1.092 m (3' 7\")     Exam: (Virtual Visit)  Constitutional: alert and no distress  Head: Appears normocephalic, atraumatic.   Respiratory: nonlabored breathing with conversatoin  Psychiatric: mentation appears normal and affect normal      Diagnostic tests:  MRI of lumbar spine was completed on 6/17/2019 showing:    See Epic Results Tab    Personally reviewed imaging       [place] " Prescription Monitoring Program reviewed    Outside records reviewed        Assessment:  1. Fibromyalgia  2. Chronic Pain Syndrome  3. Chronic Tension Type Headaches    Sonya Foote is a 70 year old female who presents with the complaints of chronic tension type headaches as well as previous diagnosis of fibromyalgia. She currently has a contract with another pain clinic for management of medications. We discussed performing imaging in order to consider if a procedure for cervical spondylosis and cervicogenic headaches would be appropriate. Based on MRI results we will discuss potential cervical MBBs bilaterally.     Plan:  Diagnosis reviewed, treatment option addressed, and risk/benefits discussed.  Self-care instructions given.  I am recommending a multidisciplinary treatment plan to help this patient better manage her pain.      1. Physical Therapy: [facility]  2. Pain Psychologist to address issues of relaxation, behavioral change, coping style, and other factors important to improvement: patient not interested at this time  3. Diagnostic Studies: Cervical MRI  4. Medication Management:    Recommend she discuss with her other pain clinic about increasing her Lyrica  dose to help with fibromyalgia  5. Further procedures recommended: none   6. Recommendations/follow-up for PCP:  Recommend she discuss with her other pain clinic about increasing her Lyrica dose to help with fibromyalgia  7. Follow up: 3 to 6 months or as needed    Total time spent was 40 minutes, and more than 50% of time was spent in counseling and/or coordination of care regarding principles of multidisciplinary care, medication management, and therapeutic options.    Evelyn Lima MD    Pain Medicine  Department of Anesthesiology  [facility]

## 2020-07-01 NOTE — PATIENT INSTRUCTIONS
Medications:    Discuss increasing your Pregabalin with [facility], as they are managing your medications.     Referrals:    Make an appointment for physical therapy with [facility].  Treatment: Evaluation & Treatment    [email or web address]    [place] [phone]  [place] [phone]  [place] ([place]) [phone]  [place] [phone]  [place] ([address], [place]) [phone]  [place] [phone]  [place] [phone]      Please be aware that coverage of these services is subject to the terms and limitations of your health insurance plan.  Call member services at your health plan with any benefit or coverage questions.      Imaging:    Cervical MRI ordered-   IMAGING SERVICES HOURS:    All imaging modalities are available from 7 a.m. - 9 p.m. Monday through Friday  X-ray, CT, MRI, and General Ultrasound appointments are available from 7 a.m. -3:30 p.m. on Saturdays  X-ray, CT and MRI appointments are available from 8 a.m. - 4:30 p.m. on Sundays  Please call [phone] to schedule imaging exams      Recommended Follow up:  3 months or as needed.        Please call [phone] to schedule this appointment if you don't already have an appointment made.         To speak with a nurse, schedule/reschedule/cancel a clinic appointment, or request a medication refill call: [phone]    You can also reach us by [employer]: [email or web address]

## 2020-07-01 NOTE — PROGRESS NOTES
LPN reviewed AVS with Pt.  Pt verbalized an understanding of information, and was asked to contact clinic with questions.    Follow up recommended by provider: 3 months or as needed.     Mary Singer LPN

## 2020-07-02 ENCOUNTER — TELEPHONE (OUTPATIENT)
Dept: INTERNAL MEDICINE | Facility: CLINIC | Age: 70
End: 2020-07-02

## 2020-07-02 NOTE — TELEPHONE ENCOUNTER
Fax received from [facility]. Face to face visit notes from 6/23 must include the following to be covered by patients insurance.     Hospital bed: patient requires positioning of the body in ways not feasible in an ordinary bed to alleviate pain     Front wheeled walker: walker will be used in the home to complete ADL's. Patient is able to safely use the walker in the home. Patient mobility limitation cannot be resolved with the use of a cane or crutches.     Updated notes need to be faxed to [facility]     Fax [phone]

## 2020-07-03 NOTE — TELEPHONE ENCOUNTER
Provider please advise if you can addend the hospital discharge notes to state the following below. OV note needs to be done today.     Addendum notes need to be faxed to [facility] today, see fax number below. Once done please call taqueria GIFFORD, RN, PHN

## 2020-07-03 NOTE — TELEPHONE ENCOUNTER
Please call Karen--  on her cell phone [phone].  Patient was discharged 07/03/20.    Patient needs her hospital bed in order for her to sleep comfortably.

## 2020-07-06 DIAGNOSIS — G89.4 CHRONIC PAIN SYNDROME: Primary | ICD-10-CM

## 2020-07-06 RX ORDER — PREGABALIN 150 MG/1
150 CAPSULE ORAL 3 TIMES DAILY
Qty: 90 CAPSULE | Refills: 0 | Status: SHIPPED | OUTPATIENT
Start: 2020-07-06 | End: 2020-07-28

## 2020-07-06 NOTE — TELEPHONE ENCOUNTER
Gordon from [facility] called to let dr. Casey that they are replacing capsaicin cream with bengay because that's what the pharmacy has. The pharmacy needs a new prescription's sent to them for lyrica and bengay cream. Thanks.

## 2020-07-08 ENCOUNTER — MEDICAL CORRESPONDENCE (OUTPATIENT)
Dept: HEALTH INFORMATION MANAGEMENT | Facility: CLINIC | Age: 70
End: 2020-07-08

## 2020-07-09 ENCOUNTER — TELEPHONE (OUTPATIENT)
Dept: INTERNAL MEDICINE | Facility: CLINIC | Age: 70
End: 2020-07-09

## 2020-07-09 DIAGNOSIS — E11.51 TYPE 2 DIABETES MELLITUS WITH DIABETIC PERIPHERAL ANGIOPATHY WITHOUT GANGRENE, WITHOUT LONG-TERM CURRENT USE OF INSULIN (H): Primary | ICD-10-CM

## 2020-07-09 NOTE — TELEPHONE ENCOUNTER
Needs orders to test blood sugar 3 x a day please sign. Pt is currently quarantined.  Fax # [phone]. Phone# [phone] to the staff.Kira Hare RN  [phone] office #. Please also sign other faxes.   Is it time to check liver enzymes?.Kira Hare RN

## 2020-07-10 ENCOUNTER — TRANSFERRED RECORDS (OUTPATIENT)
Dept: HEALTH INFORMATION MANAGEMENT | Facility: CLINIC | Age: 70
End: 2020-07-10

## 2020-07-13 ENCOUNTER — MEDICAL CORRESPONDENCE (OUTPATIENT)
Dept: HEALTH INFORMATION MANAGEMENT | Facility: CLINIC | Age: 70
End: 2020-07-13

## 2020-07-21 ENCOUNTER — TELEPHONE (OUTPATIENT)
Dept: INTERNAL MEDICINE | Facility: CLINIC | Age: 70
End: 2020-07-21

## 2020-07-21 NOTE — TELEPHONE ENCOUNTER
[facility] OT called, evaluation complete yesterday. Requesting continuing OT orders 1x/wk x 1, 2x/wk x 2wks, and 1x/wk x 2xks. Verbal okay given.

## 2020-07-22 DIAGNOSIS — M19.041 PRIMARY OSTEOARTHRITIS OF BOTH HANDS: ICD-10-CM

## 2020-07-22 DIAGNOSIS — M19.042 PRIMARY OSTEOARTHRITIS OF BOTH HANDS: ICD-10-CM

## 2020-07-22 DIAGNOSIS — M54.50 LOW BACK PAIN, UNSPECIFIED BACK PAIN LATERALITY, UNSPECIFIED CHRONICITY, UNSPECIFIED WHETHER SCIATICA PRESENT: ICD-10-CM

## 2020-07-22 DIAGNOSIS — G89.4 CHRONIC PAIN SYNDROME: ICD-10-CM

## 2020-07-23 ENCOUNTER — TELEPHONE (OUTPATIENT)
Dept: INTERNAL MEDICINE | Facility: CLINIC | Age: 70
End: 2020-07-23

## 2020-07-23 RX ORDER — LIDOCAINE 246 MG/1
PATCH TOPICAL
Qty: 30 PATCH | Refills: 10 | Status: SHIPPED | OUTPATIENT
Start: 2020-07-23

## 2020-07-23 NOTE — TELEPHONE ENCOUNTER
[facility] now requests orders and shares plan of care/discharge summaries for some patients through [employer].  Please REPLY TO THIS MESSAGE OR ROUTE BACK TO THE AUTHOR in order to give authorization for orders when needed.  This is considered a verbal order, you will still receive a faxed copy of orders for signature.  Thank you for your assistance in improving collaboration for our patients.    ORDER  Requesting PT jose eduardo and treat 2w4 for gait training with new bilat LE prosthesis, transfer training, strengthening, balance and Education on skin integrity.  Thanks,  Alesia Redd PT  [email or web address]  [phone]

## 2020-07-27 ENCOUNTER — ANCILLARY PROCEDURE (OUTPATIENT)
Dept: MRI IMAGING | Facility: CLINIC | Age: 70
End: 2020-07-27
Attending: ANESTHESIOLOGY
Payer: COMMERCIAL

## 2020-07-27 DIAGNOSIS — M47.812 CERVICAL SPONDYLOSIS: ICD-10-CM

## 2020-07-27 NOTE — TELEPHONE ENCOUNTER
Please sign and fax .Kira Hare RN      [facility]: [phone]. Walker needed has prosthetics at an Assisted living 7/3/20 .  Homecare orders needs a walker for therapy . Vendor - [employer] standard adult 2 wheel walker with gliders and clinic note verifying need or discussion of need for walker. Fax to [facility] Care coordinator Zoe .Kira Hare RN  [phone]. Fax # and phone # . Zoe .Kira Hare RN

## 2020-07-28 DIAGNOSIS — G89.4 CHRONIC PAIN SYNDROME: ICD-10-CM

## 2020-07-28 DIAGNOSIS — J44.9 CHRONIC OBSTRUCTIVE PULMONARY DISEASE, UNSPECIFIED COPD TYPE (H): ICD-10-CM

## 2020-07-28 RX ORDER — PREGABALIN 150 MG/1
CAPSULE ORAL
Qty: 90 CAPSULE | Refills: 4 | Status: SHIPPED | OUTPATIENT
Start: 2020-07-28 | End: 2021-01-29

## 2020-07-29 DIAGNOSIS — R35.0 URINARY FREQUENCY: ICD-10-CM

## 2020-07-29 DIAGNOSIS — Z86.73 HISTORY OF STROKE: Primary | ICD-10-CM

## 2020-07-29 DIAGNOSIS — E78.5 HYPERLIPIDEMIA LDL GOAL <100: ICD-10-CM

## 2020-07-29 DIAGNOSIS — E11.9 DIABETES MELLITUS TYPE 2 WITHOUT RETINOPATHY (H): ICD-10-CM

## 2020-07-29 DIAGNOSIS — L89.132: ICD-10-CM

## 2020-07-29 DIAGNOSIS — R11.0 NAUSEA: ICD-10-CM

## 2020-07-29 DIAGNOSIS — F25.9 SCHIZOAFFECTIVE DISORDER, UNSPECIFIED TYPE (H): ICD-10-CM

## 2020-07-29 DIAGNOSIS — K21.9 GASTROESOPHAGEAL REFLUX DISEASE WITHOUT ESOPHAGITIS: ICD-10-CM

## 2020-07-29 DIAGNOSIS — Z86.79 HISTORY OF CORONARY ARTERY DISEASE: ICD-10-CM

## 2020-07-29 DIAGNOSIS — J30.9 ALLERGIC RHINITIS, UNSPECIFIED SEASONALITY, UNSPECIFIED TRIGGER: ICD-10-CM

## 2020-07-30 RX ORDER — ESCITALOPRAM OXALATE 10 MG/1
TABLET ORAL
Qty: 28 TABLET | Refills: 10 | Status: SHIPPED | OUTPATIENT
Start: 2020-07-30 | End: 2021-06-29

## 2020-07-30 RX ORDER — DOCUSATE SODIUM 50MG AND SENNOSIDES 8.6MG 8.6; 5 MG/1; MG/1
TABLET, FILM COATED ORAL
Qty: 56 TABLET | Refills: 10 | Status: SHIPPED | OUTPATIENT
Start: 2020-07-30 | End: 2021-06-29

## 2020-07-30 RX ORDER — ASPIRIN 81 MG
TABLET,CHEWABLE ORAL
Qty: 28 TABLET | Refills: 10 | Status: SHIPPED | OUTPATIENT
Start: 2020-07-30 | End: 2021-06-29

## 2020-07-30 RX ORDER — METOPROLOL SUCCINATE 50 MG/1
TABLET, EXTENDED RELEASE ORAL
Qty: 30 TABLET | Refills: 10 | Status: SHIPPED | OUTPATIENT
Start: 2020-07-30 | End: 2021-06-29

## 2020-07-30 RX ORDER — SOLIFENACIN SUCCINATE 10 MG/1
TABLET, FILM COATED ORAL
Qty: 28 TABLET | Refills: 10 | Status: SHIPPED | OUTPATIENT
Start: 2020-07-30 | End: 2021-06-29

## 2020-07-30 RX ORDER — LEVETIRACETAM 500 MG/1
TABLET ORAL
Qty: 56 TABLET | Refills: 11 | Status: SHIPPED | OUTPATIENT
Start: 2020-07-30 | End: 2021-07-27

## 2020-07-30 RX ORDER — RISPERIDONE 0.5 MG/1
TABLET ORAL
Qty: 28 TABLET | Refills: 4 | Status: SHIPPED | OUTPATIENT
Start: 2020-07-30 | End: 2021-01-14

## 2020-07-30 RX ORDER — FERROUS SULFATE 325(65) MG
TABLET ORAL
Qty: 56 TABLET | Refills: 10 | Status: SHIPPED | OUTPATIENT
Start: 2020-07-30 | End: 2021-06-29

## 2020-07-30 RX ORDER — ATORVASTATIN CALCIUM 40 MG/1
TABLET, FILM COATED ORAL
Qty: 28 TABLET | Refills: 10 | Status: SHIPPED | OUTPATIENT
Start: 2020-07-30 | End: 2021-06-29

## 2020-07-30 RX ORDER — PANTOPRAZOLE SODIUM 40 MG/1
TABLET, DELAYED RELEASE ORAL
Qty: 28 TABLET | Refills: 11 | Status: SHIPPED | OUTPATIENT
Start: 2020-07-30 | End: 2021-07-27

## 2020-07-30 RX ORDER — MULTIVITAMIN WITH FOLIC ACID 400 MCG
TABLET ORAL
Qty: 28 TABLET | Refills: 11 | Status: SHIPPED | OUTPATIENT
Start: 2020-07-30

## 2020-07-30 RX ORDER — TRAZODONE HYDROCHLORIDE 150 MG/1
TABLET ORAL
Qty: 28 TABLET | Refills: 10 | Status: SHIPPED | OUTPATIENT
Start: 2020-07-30 | End: 2021-06-29

## 2020-07-30 RX ORDER — PHENYTOIN SODIUM 200 MG/1
CAPSULE, EXTENDED RELEASE ORAL
Qty: 56 CAPSULE | Refills: 11 | Status: ON HOLD | OUTPATIENT
Start: 2020-07-30 | End: 2020-12-26

## 2020-07-30 RX ORDER — LORATADINE 10 MG/1
TABLET ORAL
Qty: 28 TABLET | Refills: 11 | Status: SHIPPED | OUTPATIENT
Start: 2020-07-30 | End: 2021-07-27

## 2020-07-30 RX ORDER — OMEGA-3S/DHA/EPA/FISH OIL/D3 300MG-1000
CAPSULE ORAL
Qty: 56 TABLET | Refills: 11 | Status: SHIPPED | OUTPATIENT
Start: 2020-07-30 | End: 2021-06-29

## 2020-07-30 RX ORDER — LISINOPRIL 20 MG/1
TABLET ORAL
Qty: 28 TABLET | Refills: 11 | Status: SHIPPED | OUTPATIENT
Start: 2020-07-30 | End: 2021-07-27

## 2020-07-30 NOTE — TELEPHONE ENCOUNTER
Routing refill request to provider for review/approval because:  Drug not on the [facility] refill protocol   Labs out of range:  Age, hgb, creat  Labs not current:  a1c

## 2020-07-31 ENCOUNTER — TELEPHONE (OUTPATIENT)
Dept: INTERNAL MEDICINE | Facility: CLINIC | Age: 70
End: 2020-07-31

## 2020-07-31 DIAGNOSIS — M54.9 CHRONIC BACK PAIN: Primary | ICD-10-CM
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
## 2020-07-31 DIAGNOSIS — G89.29 CHRONIC BACK PAIN: Primary | ICD-10-CM

## 2020-07-31 NOTE — TELEPHONE ENCOUNTER
Ruben, [phone]    Reporting a fall just a few minutes ago. Patient was sitting in recliner and was trying to place the elevated feet of chair down. Patient slid out of the recliner and landed on her buttock.     Vital's are stable. No open areas. No bleeding. ROM intake. AxOx3. Patient declining to go the ER as patient is reporting back pain.     Hx of chronic back pain but Assisted Living wanted to rule out any further injury. Ok with in house x-ray? Would need to send an order to:  [phone]    Order td'up. Please review, edit/add, and sign if appropriate.     Valeria CACERESN, RN, PHN

## 2020-08-03 DIAGNOSIS — M81.0 AGE-RELATED OSTEOPOROSIS WITHOUT CURRENT PATHOLOGICAL FRACTURE: ICD-10-CM

## 2020-08-04 RX ORDER — POLYETHYLENE GLYCOL 3350 17 G/17G
POWDER, FOR SOLUTION ORAL
Qty: 510 G | Refills: 8 | Status: SHIPPED | OUTPATIENT
Start: 2020-08-04

## 2020-08-04 RX ORDER — ALENDRONATE SODIUM 70 MG/1
TABLET ORAL
Qty: 4 TABLET | Refills: 97 | Status: SHIPPED | OUTPATIENT
Start: 2020-08-04 | End: 2021-09-01

## 2020-08-06 ENCOUNTER — TRANSFERRED RECORDS (OUTPATIENT)
Dept: HEALTH INFORMATION MANAGEMENT | Facility: CLINIC | Age: 70
End: 2020-08-06

## 2020-08-07 ENCOUNTER — OFFICE VISIT (OUTPATIENT)
Dept: PALLIATIVE CARE | Facility: CLINIC | Age: 70
End: 2020-08-07
Attending: INTERNAL MEDICINE
Payer: COMMERCIAL

## 2020-08-07 VITALS
WEIGHT: 125 LBS | DIASTOLIC BLOOD PRESSURE: 68 MMHG | BODY MASS INDEX: 47.53 KG/M2 | OXYGEN SATURATION: 95 % | HEART RATE: 69 BPM | SYSTOLIC BLOOD PRESSURE: 102 MMHG

## 2020-08-07 DIAGNOSIS — J44.9 CHRONIC OBSTRUCTIVE PULMONARY DISEASE, UNSPECIFIED COPD TYPE (H): Primary | ICD-10-CM

## 2020-08-07 DIAGNOSIS — Z71.89 ADVANCE CARE PLANNING: ICD-10-CM

## 2020-08-07 PROCEDURE — G0463 HOSPITAL OUTPT CLINIC VISIT: HCPCS | Mod: ZF

## 2020-08-07 PROCEDURE — 99204 OFFICE O/P NEW MOD 45 MIN: CPT | Mod: ZP | Performed by: INTERNAL MEDICINE

## 2020-08-07 ASSESSMENT — PAIN SCALES - GENERAL: PAINLEVEL: EXTREME PAIN (8)

## 2020-08-07 NOTE — NURSING NOTE
"Oncology Rooming Note    August 7, 2020 9:13 AM   Sonya Foote is a 70 year old female who presents for:    Chief Complaint   Patient presents with     New Patient     Palliative care consult- Chronic obstructive pulmonary disease, unspecified COPD      Initial Vitals: /68   Pulse 69   Wt 56.7 kg (125 lb)   SpO2 95%   BMI 47.53 kg/m   Estimated body mass index is 47.53 kg/m  as calculated from the following:    Height as of 7/1/20: 1.092 m (3' 7\").    Weight as of this encounter: 56.7 kg (125 lb). Body surface area is 1.31 meters squared.  Extreme Pain (8) Comment: Data Unavailable   No LMP recorded. (Menstrual status: Amenorrhea).  Allergies reviewed: Yes  Medications reviewed: Yes    Medications: Medication refills not needed today.  Pharmacy name entered into EPIC:    [facility] [place], [place] - [address] [place] [address] [id number] - [place], [place] - [address] AT [facility] [address]  [facility] - [place], [place] - [address] [place] [address]    Clinical concerns: N/A       Manisha Chavez MA            "

## 2020-08-07 NOTE — LETTER
8/7/2020       RE: Sonya Foote  [address]  [place] [address]     Dear Colleague,    Thank you for referring your patient, Sonya Foote, to the [facility] at [facility]. Please see a copy of my visit note below.    Palliative Care Outpatient Clinic    (This note was transcribed using voice recognition software. While I review and edit the transcription, I may miss errors, and the software sometimes does unexpected capitalizations and formatting that I miss. Please let me know of any serious mistranscriptions and I will addend this note.)    Patient ID:  Complex PMHx, patient referred today by pulm clinic for help with cough sx and goals of care per referral order.    Comorbidities of chronic pain syndrome s/p IT pump ([facility]), COPD, DM2, HTN, CAD, hx strokes, PAD s/p bilat LE amputations AKAs, cervical spondylosis, tobacco use disorder; chart describes a cardiomyopathy but last echo normal/recovered; significant visual impairment/she is legally blind; suprapubic catheter    Hx of frequent DNR/DNI orders mixed with full code orders going back to 2013 at least  +HCD 2018, names son Kam as primary decision maker. Full code POLST on chart 4/2019.     Per chart Sonya is a trans woman; she self-identifies as a demarco woman to me.    History:  She is seen alone today.  I discussed the reason for the palliative care referral and what we do.  She was not really interested in discussing symptoms today.  Her major symptom concerns are her severe chronic pain which is being managed by pain clinic and she has an intrathecal pump but it sounds like it is still quite severe frequently.    We talked about her overall health and the big picture at length.  Currently she feels like her health is stable and in fact improving although she is had many significant complications the last several years including her bilateral above-the-knee amputations and  "hospitalizations particular for respiratory problems.  However she is no longer living in a nursing home and is now in assisted living.  She is relatively independent with a electric wheelchair.  She has help with showers and housekeeping.  Usually she can dress herself but sometimes she is feeling weak and she needs help with dressing as well.  She has a suprapubic catheter and she needs help keeping that clean.  She has  new prosthetic legs & she is learning to walk with them she reports and my sense is that she has a lot of progress to make although she describes that overall it is \"going okay.\"     She very much understands with her various health conditions she is at high risk of dying really at any time, but may live years yet.     All that being said she rates her current quality of life as acceptable although not great.  We have a long discussion about the ongoing medical problems she is likely to face and how aggressive she wants to be with her health care.  She is very clear with me that she wants a DNR order and we talked about that and she knows what that means.  She has had DNR orders multiple times in the past I note although most recently she is been full code.  She reports that is because her son very much wants her to be full code and she wants to honor and respect his wishes although she makes it clear she herself would have a DNR order if she did not have anyone else to consider.  She would be okay with elective intubation if there is a reasonable chance she could recover from that.  Would not want long-term mechanical ventilation, or mechanical ventilation if there was minimal hope of survival and recovery.  Otherwise she does want active treatments to prolong her life and is willing to be rehospitalized and have ongoing work-up and evaluation.    We talked about the future and it sounds like a few years ago after her wife  she was feeling really bleak but then she made a commitment to " herself that she would try to stay alive and she is been very glad she did that.  She now has another grand child who is 7 months old and she is been able to meet that child and she talks about hoping that her grandchildren survive long enough that they can learn about their other grandmother, Toña.    SH: She grew up in [facility].  Spent most of her life in the [place] area.  She was a  who worked with the mentally ill.  She is -her wife Toña  several years ago.  They moved to [place] several years ago to be closer to their children as they entered their elderly years.  She has 2 children and several grandchildren.  They are in the [facility].  She has a sister in [place] who she is close with as well.  Smokes tobacco heavily.    ROS: Comprehensive ROS systems otherwise negative.    PE: There were no vitals taken for this visit.   Wt Readings from Last 3 Encounters:   20 56.7 kg (125 lb)   20 58.2 kg (128 lb 6.4 oz)   20 59 kg (130 lb)     Alert pleasant woman in no acute distress sitting in a electric wheelchair which she uses easily.  Head NCAT  Eyes anicteric no injection  Mouth moist no lesions  Neck supple no LAD  Lungs unlab ctab  CV rrr s1s2  abd soft ntnd  bilat AKA  Neuropsych: alert, full affect, clear sensorium, memory thought processes and fund of knowledge all normal    Data reviewed:  I reviewed recent labs and imaging, my comments:  Cr 0.49    Echo  essentially normal    No recent PFTs  Recent EGD showed esophageal stenosis and impacted food    Impression & Recommendations:  69 yo F with COPD, advanced vascular disease s/p AKAs    Lengthy discussions re care goals and ACP as above.  After considering it, she decided she did want to actually enact a DNR order and we reviewed and completed a DNR/OK to intubate POLST today. Gave her original and a copy to give to her [facility] nurses.    F/u 6 months for ongoing discussions about ACP/care goals.  At least 20 min of today's visit discussing HCD/ACP.    Clayton Guadarrama MD / Palliative Medicine / Mobile [phone] - texts, without PHI, preferred / My clinic is in the [facility]: [phone] (scheduling); [phone] (triage). [facility] Inpatient Team Consult Pager [phone] (answered 8am-430pm M-F) - prefer text pages via myairmail / Palliative After-Hours Answering Service [phone].           Chart data reviewed today:  Advance care planning:     Family History   Problem Relation Age of Onset     Dementia Mother      Diabetes Mother         type unknown     Coronary Artery Disease Mother         MI     Glaucoma Father      Diabetes Father         type unknown     Heart Failure Father      Schizophrenia Brother      Depression Brother      Suicide Sister      Depression Sister      Diabetes Sister      Ovarian Cancer Maternal Aunt      Leukemia Maternal Aunt      Diabetes Maternal Grandmother      Diabetes Maternal Grandfather      Diabetes Paternal Grandmother      Diabetes Paternal Grandfather      Breast Cancer Sister      Cerebrovascular Disease Brother      Colon Cancer No family hx of      Macular Degeneration No family hx of      Past Medical History:   Diagnosis Date     Acid reflux disease      Amputation above knee (H)     bilateral     Benign essential hypertension      Blind left eye     due to central retinal artery occlusion     CAD (coronary artery disease)     UA and inferior MI in 2016 s/p PCI     Chronic back pain      Chronic neck pain      Chronic pain syndrome     with fentanyl intrathecal pain pump     Complex sleep apnea syndrome      COPD (chronic obstructive pulmonary disease) (H)      Dysphagia     chronic due to esophageal strictures and multiple previous dilitations      ROGER (generalized anxiety disorder)      History of blood transfusion     x5; no adverse reactions     History of central retinal artery occlusion 2006    left-sided     History of stroke     residual left-sided  weakness     Hx of carotid artery stenosis     s/p left carotid stent in 2006 and balloon angioplasty in 2016     MDD (major depressive disorder)      Neurogenic bladder     SPT in place     JOSE (obstructive sleep apnea)      Osteoporosis      PAD (peripheral artery disease) (H)      Peripheral neuropathy      Person who has had sex change operation     male to female     Seizure disorder (H)     many years since last grand mal; daily, brief petit mals     Sickle cell trait (H)      Type 2 diabetes mellitus (H)      Past Surgical History:   Procedure Laterality Date     AMPUTATE LEG ABOVE KNEE Left 6/11/2016    Procedure: AMPUTATE LEG ABOVE KNEE;  Surgeon: Mello Rodriguez MD;  [facility]     AMPUTATE LEG BELOW KNEE Right 11/7/2016    Procedure: AMPUTATE LEG BELOW KNEE;  Surgeon: Savannah Durant MD;  [facility]     AMPUTATE REVISION STUMP LOWER EXTREMITY Right 11/11/2016    Procedure: AMPUTATE REVISION STUMP LOWER EXTREMITY;  Surgeon: Savannah Durant MD;  [facility]     AMPUTATE REVISION STUMP LOWER EXTREMITY Right 11/16/2016    Procedure: AMPUTATE REVISION STUMP LOWER EXTREMITY;  Surgeon: Savannah Durant MD;  [facility]     AMPUTATE TOE(S) Right 1/5/2016    Procedure: AMPUTATE TOE(S);  Surgeon: Mello Gaines DPM;  Location:  [facility]     ANGIOGRAM Bilateral 11/21/2014    Procedure: ANGIOGRAM;  Surgeon: Savannah Durant MD;  [facility]     ANGIOGRAM Left 1/16/2015    Procedure: ANGIOGRAM;  Surgeon: Savannah Durant MD;  [facility]     ANGIOGRAM Bilateral 9/14/2015    Procedure: ANGIOGRAM;  Surgeon: Savannah Durant MD;  [facility]     ANGIOGRAM Left 10/12/2015    Procedure: ANGIOGRAM;  Surgeon: Savannah Durant MD;  [facility]     ANGIOGRAM Right 6/6/2016    Procedure: ANGIOGRAM;  Surgeon: Savannah Durant MD;  [facility]     ANGIOPLASTY Right 6/6/2016    Procedure: ANGIOPLASTY;  Surgeon: Savannah Durant MD;  [facility]     APPENDECTOMY       BREAST BIOPSY, RT/LT Right     benign      CATARACT IOL, RT/LT Right      CHOLECYSTECTOMY       COLONOSCOPY N/A 8/25/2014    Procedure: COLONOSCOPY;  Surgeon: Mello Ferrer MD;  [facility]     COLONOSCOPY WITH CO2 INSUFFLATION N/A 8/20/2014    Procedure: COLONOSCOPY WITH CO2 INSUFFLATION;  Surgeon: Duane, William Charles, MD;  Location: [facility] OR     CYSTOSCOPY, INJECT BOTOX N/A 5/21/2020    Procedure: CYSTOSCOPY, WITH BOTULINUM TOXIN INJECTION;  Surgeon: Loki Gordon MD;  [facility]     CYSTOSCOPY, INTRAVESICAL INJECTION N/A 10/31/2019    Procedure: CYSTOSCOPY, BOTOX INJECTION, Suprapubic Catheter Exchange;  Surgeon: Loki Gordon MD;  [facility]     CYSTOSTOMY, INSERT TUBE SUPRAPUBIC, COMBINED N/A 1/16/2018    Procedure: COMBINED CYSTOSTOMY, INSERT TUBE SUPRAPUBIC;  Cystoscopy, Intraoperative Ultrasound, Suprapubic Tube Placement;  Surgeon: Keanu Dawson MD;  [facility]     ENDARTERECTOMY FEMORAL  5/23/2014    Procedure: ENDARTERECTOMY FEMORAL;  Surgeon: Jason Joshi MD;  [facility]     ESOPHAGOSCOPY, GASTROSCOPY, DUODENOSCOPY (EGD), COMBINED  12/14/2012    Procedure: COMBINED ESOPHAGOSCOPY, GASTROSCOPY, DUODENOSCOPY (EGD), BIOPSY SINGLE OR MULTIPLE;  ESOPHAGOSCOPY, GASTROSCOPY, DUODENOSCOPY (EGD), DILATATION ;  Surgeon: Elizabeth Stevenson MD;  Location:  GI     ESOPHAGOSCOPY, GASTROSCOPY, DUODENOSCOPY (EGD), COMBINED  12/31/2013    Procedure: COMBINED ESOPHAGOSCOPY, GASTROSCOPY, DUODENOSCOPY (EGD), BIOPSY SINGLE OR MULTIPLE;;  Surgeon: Clemente Lopez MD;  [facility]     ESOPHAGOSCOPY, GASTROSCOPY, DUODENOSCOPY (EGD), COMBINED  4/1/2014    Procedure: COMBINED ESOPHAGOSCOPY, GASTROSCOPY, DUODENOSCOPY (EGD);;  Surgeon: Clemente Lopez MD;  [facility]     ESOPHAGOSCOPY, GASTROSCOPY, DUODENOSCOPY (EGD), COMBINED  6/28/2014    Procedure: COMBINED ESOPHAGOSCOPY, GASTROSCOPY, DUODENOSCOPY (EGD);  Surgeon: Clemente Lopez MD;  Location:  GI     ESOPHAGOSCOPY, GASTROSCOPY, DUODENOSCOPY  (EGD), COMBINED N/A 8/20/2014    Procedure: COMBINED ESOPHAGOSCOPY, GASTROSCOPY, DUODENOSCOPY (EGD), BIOPSY SINGLE OR MULTIPLE;  Surgeon: Duane, William Charles, MD;  Location:  OR     ESOPHAGOSCOPY, GASTROSCOPY, DUODENOSCOPY (EGD), COMBINED N/A 8/22/2014    Procedure: COMBINED ESOPHAGOSCOPY, GASTROSCOPY, DUODENOSCOPY (EGD), BIOPSY SINGLE OR MULTIPLE;  Surgeon: Mello Ferrer MD;  [facility]     ESOPHAGOSCOPY, GASTROSCOPY, DUODENOSCOPY (EGD), COMBINED N/A 10/2/2014    Procedure: COMBINED ESOPHAGOSCOPY, GASTROSCOPY, DUODENOSCOPY (EGD), BIOPSY SINGLE OR MULTIPLE;  Surgeon: Remy Haskins MD;  [facility]     ESOPHAGOSCOPY, GASTROSCOPY, DUODENOSCOPY (EGD), COMBINED Left 12/15/2014    Procedure: COMBINED ESOPHAGOSCOPY, GASTROSCOPY, DUODENOSCOPY (EGD), BIOPSY SINGLE OR MULTIPLE;  Surgeon: Remy Haskins MD;  [facility]     ESOPHAGOSCOPY, GASTROSCOPY, DUODENOSCOPY (EGD), COMBINED N/A 2/25/2015    Procedure: COMBINED ENDOSCOPIC ULTRASOUND, ESOPHAGOSCOPY, GASTROSCOPY, DUODENOSCOPY (EGD), FINE NEEDLE ASPIRATE/BIOPSY;  Surgeon: Clemente Lugo MD;  Location:  GI     ESOPHAGOSCOPY, GASTROSCOPY, DUODENOSCOPY (EGD), COMBINED Left 2/25/2015    Procedure: COMBINED ESOPHAGOSCOPY, GASTROSCOPY, DUODENOSCOPY (EGD), BIOPSY SINGLE OR MULTIPLE;  Surgeon: Clemente Lugo MD;  [facility]     ESOPHAGOSCOPY, GASTROSCOPY, DUODENOSCOPY (EGD), COMBINED N/A 9/25/2016    Procedure: COMBINED ESOPHAGOSCOPY, GASTROSCOPY, DUODENOSCOPY (EGD);  Surgeon: Aziza Patiño MD;  [facility]     ESOPHAGOSCOPY, GASTROSCOPY, DUODENOSCOPY (EGD), COMBINED N/A 1/18/2017    Procedure: COMBINED ESOPHAGOSCOPY, GASTROSCOPY, DUODENOSCOPY (EGD), BIOPSY SINGLE OR MULTIPLE;  Surgeon: Clemente Lopez MD;  Location:  GI     ESOPHAGOSCOPY, GASTROSCOPY, DUODENOSCOPY (EGD), COMBINED N/A 11/26/2017    Procedure: COMBINED ESOPHAGOSCOPY, GASTROSCOPY, DUODENOSCOPY (EGD), REMOVE FOREIGN BODY;  Esophagogastroduodenoscopy with  foreign body extraction  ;  Surgeon: Herberth Castrejon MD;  [facility]     ESOPHAGOSCOPY, GASTROSCOPY, DUODENOSCOPY (EGD), COMBINED N/A 11/26/2017    Procedure: COMBINED ESOPHAGOSCOPY, GASTROSCOPY, DUODENOSCOPY (EGD), REMOVE FOREIGN BODY;;  Surgeon: Herberth Castrejon MD;  [facility]     ESOPHAGOSCOPY, GASTROSCOPY, DUODENOSCOPY (EGD), COMBINED N/A 4/10/2020    Procedure: ESOPHAGOGASTRODUODENOSCOPY (EGD);  Surgeon: Yael Cabral MD;  [facility]     FASCIOTOMY LOWER EXTREMITY Left 6/10/2016    Procedure: FASCIOTOMY LOWER EXTREMITY;  Surgeon: Mello Rodriguez MD;  [facility]     HC CAPSULE ENDOSCOPY N/A 8/25/2014    Procedure: CAPSULE/PILL CAM ENDOSCOPY;  Surgeon: Remy Haskins MD;  [facility]     HC CAPSULE ENDOSCOPY N/A 10/2/2014    Procedure: CAPSULE/PILL CAM ENDOSCOPY;  Surgeon: Remy Haskins MD;  [facility]     ORTHOPEDIC SURGERY      broken wrist repair     SEX TRANSFORMATION SURGERY, MALE TO FEMALE  1974 1974     SINUS SURGERY      cyst removed     TONSILLECTOMY       VASCULAR SURGERY  2006    Left carotid stent     Allergies   Allergen Reactions     Bee Venom Anaphylaxis     Penicillins Anaphylaxis     Dilantin [Phenytoin] Other (See Comments)     Generic dilantin only per pt     Iodine Hives     Novocaine [Procaine] Hives     Tositumomab Unknown     Medications: I have reviewed the patient's medication profile.      Thank you for involving us in the patient's care.   Clayton Guadarrama MD / Palliative Medicine / Text me via ImpulseFlyer - search for '[place]' - then click the pager icon / My clinic is in the [facility]: [phone] (scheduling); [phone] (triage). Palliative care [facility] outpatient care coordinator is Ethel Quintana RN: [phone]. Palliative After-Hours Answering Service: [phone].

## 2020-08-07 NOTE — PROGRESS NOTES
Palliative Care Outpatient Clinic    (This note was transcribed using voice recognition software. While I review and edit the transcription, I may miss errors, and the software sometimes does unexpected capitalizations and formatting that I miss. Please let me know of any serious mistranscriptions and I will addend this note.)    Patient ID:  Complex PMHx, patient referred today by pulm clinic for help with cough sx and goals of care per referral order.    Comorbidities of chronic pain syndrome s/p IT pump ([facility]), COPD, DM2, HTN, CAD, hx strokes, PAD s/p bilat LE amputations AKAs, cervical spondylosis, tobacco use disorder; chart describes a cardiomyopathy but last echo normal/recovered; significant visual impairment/she is legally blind; suprapubic catheter    Hx of frequent DNR/DNI orders mixed with full code orders going back to 2013 at least  +HCD 2018, names son Kam as primary decision maker. Full code POLST on chart 4/2019.     Per chart Sonya is a trans woman; she self-identifies as a demarco woman to me.    History:  She is seen alone today.  I discussed the reason for the palliative care referral and what we do.  She was not really interested in discussing symptoms today.  Her major symptom concerns are her severe chronic pain which is being managed by pain clinic and she has an intrathecal pump but it sounds like it is still quite severe frequently.    We talked about her overall health and the big picture at length.  Currently she feels like her health is stable and in fact improving although she is had many significant complications the last several years including her bilateral above-the-knee amputations and hospitalizations particular for respiratory problems.  However she is no longer living in a nursing home and is now in assisted living.  She is relatively independent with a electric wheelchair.  She has help with showers and housekeeping.  Usually she can dress herself but sometimes she is  "feeling weak and she needs help with dressing as well.  She has a suprapubic catheter and she needs help keeping that clean.  She has  new prosthetic legs & she is learning to walk with them she reports and my sense is that she has a lot of progress to make although she describes that overall it is \"going okay.\"     She very much understands with her various health conditions she is at high risk of dying really at any time, but may live years yet.     All that being said she rates her current quality of life as acceptable although not great.  We have a long discussion about the ongoing medical problems she is likely to face and how aggressive she wants to be with her health care.  She is very clear with me that she wants a DNR order and we talked about that and she knows what that means.  She has had DNR orders multiple times in the past I note although most recently she is been full code.  She reports that is because her son very much wants her to be full code and she wants to honor and respect his wishes although she makes it clear she herself would have a DNR order if she did not have anyone else to consider.  She would be okay with elective intubation if there is a reasonable chance she could recover from that.  Would not want long-term mechanical ventilation, or mechanical ventilation if there was minimal hope of survival and recovery.  Otherwise she does want active treatments to prolong her life and is willing to be rehospitalized and have ongoing work-up and evaluation.    We talked about the future and it sounds like a few years ago after her wife  she was feeling really bleak but then she made a commitment to herself that she would try to stay alive and she is been very glad she did that.  She now has another grand child who is 7 months old and she is been able to meet that child and she talks about hoping that her grandchildren survive long enough that they can learn about their other grandmother, " Toña.    SH: She grew up in [facility].  Spent most of her life in the [place] area.  She was a  who worked with the mentally ill.  She is -her wife Toña  several years ago.  They moved to [place] several years ago to be closer to their children as they entered their elderly years.  She has 2 children and several grandchildren.  They are in the [facility].  She has a sister in [place] who she is close with as well.  Smokes tobacco heavily.    ROS: Comprehensive ROS systems otherwise negative.    PE: There were no vitals taken for this visit.   Wt Readings from Last 3 Encounters:   20 56.7 kg (125 lb)   20 58.2 kg (128 lb 6.4 oz)   20 59 kg (130 lb)     Alert pleasant woman in no acute distress sitting in a electric wheelchair which she uses easily.  Head NCAT  Eyes anicteric no injection  Mouth moist no lesions  Neck supple no LAD  Lungs unlab ctab  CV rrr s1s2  abd soft ntnd  bilat AKA  Neuropsych: alert, full affect, clear sensorium, memory thought processes and fund of knowledge all normal    Data reviewed:  I reviewed recent labs and imaging, my comments:  Cr 0.49    Echo Quin essentially normal    No recent PFTs  Recent EGD showed esophageal stenosis and impacted food    Impression & Recommendations:  71 yo F with COPD, advanced vascular disease s/p AKAs    Lengthy discussions re care goals and ACP as above.  After considering it, she decided she did want to actually enact a DNR order and we reviewed and completed a DNR/OK to intubate POLST today. Gave her original and a copy to give to her prison nurses.    F/u 6 months for ongoing discussions about ACP/care goals. At least 20 min of today's visit discussing HCD/ACP.    Clayton Guadarrama MD / Palliative Medicine / Mobile [phone] - texts, without PHI, preferred / My clinic is in the [facility]: [phone] (scheduling); [phone] (triage). [facility] Inpatient Team Consult Pager [phone] (answered 8am-430pm  M-F) - prefer text pages via [employer] / Palliative After-Hours Answering Service [phone].           Chart data reviewed today:  Advance care planning:     Family History   Problem Relation Age of Onset     Dementia Mother      Diabetes Mother         type unknown     Coronary Artery Disease Mother         MI     Glaucoma Father      Diabetes Father         type unknown     Heart Failure Father      Schizophrenia Brother      Depression Brother      Suicide Sister      Depression Sister      Diabetes Sister      Ovarian Cancer Maternal Aunt      Leukemia Maternal Aunt      Diabetes Maternal Grandmother      Diabetes Maternal Grandfather      Diabetes Paternal Grandmother      Diabetes Paternal Grandfather      Breast Cancer Sister      Cerebrovascular Disease Brother      Colon Cancer No family hx of      Macular Degeneration No family hx of      Past Medical History:   Diagnosis Date     Acid reflux disease      Amputation above knee (H)     bilateral     Benign essential hypertension      Blind left eye     due to central retinal artery occlusion     CAD (coronary artery disease)     UA and inferior MI in 2016 s/p PCI     Chronic back pain      Chronic neck pain      Chronic pain syndrome     with fentanyl intrathecal pain pump     Complex sleep apnea syndrome      COPD (chronic obstructive pulmonary disease) (H)      Dysphagia     chronic due to esophageal strictures and multiple previous dilitations      ROGER (generalized anxiety disorder)      History of blood transfusion     x5; no adverse reactions     History of central retinal artery occlusion 2006    left-sided     History of stroke     residual left-sided weakness     Hx of carotid artery stenosis     s/p left carotid stent in 2006 and balloon angioplasty in 2016     MDD (major depressive disorder)      Neurogenic bladder     SPT in place     JOSE (obstructive sleep apnea)      Osteoporosis      PAD (peripheral artery disease) (H)      Peripheral  neuropathy      Person who has had sex change operation     male to female     Seizure disorder (H)     many years since last grand mal; daily, brief petit mals     Sickle cell trait (H)      Type 2 diabetes mellitus (H)      Past Surgical History:   Procedure Laterality Date     AMPUTATE LEG ABOVE KNEE Left 6/11/2016    Procedure: AMPUTATE LEG ABOVE KNEE;  Surgeon: Mello Rodriguez MD;  [facility]     AMPUTATE LEG BELOW KNEE Right 11/7/2016    Procedure: AMPUTATE LEG BELOW KNEE;  Surgeon: Savannah Durant MD;  [facility]     AMPUTATE REVISION STUMP LOWER EXTREMITY Right 11/11/2016    Procedure: AMPUTATE REVISION STUMP LOWER EXTREMITY;  Surgeon: Savannah Durant MD;  [facility]     AMPUTATE REVISION STUMP LOWER EXTREMITY Right 11/16/2016    Procedure: AMPUTATE REVISION STUMP LOWER EXTREMITY;  Surgeon: Savannah Durant MD;  [facility]     AMPUTATE TOE(S) Right 1/5/2016    Procedure: AMPUTATE TOE(S);  Surgeon: Mello Gaines DPM;  [facility]: [facility]     ANGIOGRAM Bilateral 11/21/2014    Procedure: ANGIOGRAM;  Surgeon: Savannah Durant MD;  [facility]     ANGIOGRAM Left 1/16/2015    Procedure: ANGIOGRAM;  Surgeon: Savannah Durant MD;  [facility]     ANGIOGRAM Bilateral 9/14/2015    Procedure: ANGIOGRAM;  Surgeon: Savannah Durant MD;  [facility]     ANGIOGRAM Left 10/12/2015    Procedure: ANGIOGRAM;  Surgeon: Savannah Durant MD;  [facility]     ANGIOGRAM Right 6/6/2016    Procedure: ANGIOGRAM;  Surgeon: Savannah Durant MD;  [facility]     ANGIOPLASTY Right 6/6/2016    Procedure: ANGIOPLASTY;  Surgeon: Savannah Durant MD;  [facility]     APPENDECTOMY       BREAST BIOPSY, RT/LT Right     benign     CATARACT IOL, RT/LT Right      CHOLECYSTECTOMY       COLONOSCOPY N/A 8/25/2014    Procedure: COLONOSCOPY;  Surgeon: Mello Ferrer MD;  [facility]     COLONOSCOPY WITH CO2 INSUFFLATION N/A 8/20/2014    Procedure: COLONOSCOPY WITH CO2 INSUFFLATION;  Surgeon: Duane, William Charles, MD;   [facility]     CYSTOSCOPY, INJECT BOTOX N/A 5/21/2020    Procedure: CYSTOSCOPY, WITH BOTULINUM TOXIN INJECTION;  Surgeon: Loki Gordon MD;  [facility]     CYSTOSCOPY, INTRAVESICAL INJECTION N/A 10/31/2019    Procedure: CYSTOSCOPY, BOTOX INJECTION, Suprapubic Catheter Exchange;  Surgeon: Loki Gordon MD;  [facility]     CYSTOSTOMY, INSERT TUBE SUPRAPUBIC, COMBINED N/A 1/16/2018    Procedure: COMBINED CYSTOSTOMY, INSERT TUBE SUPRAPUBIC;  Cystoscopy, Intraoperative Ultrasound, Suprapubic Tube Placement;  Surgeon: Keanu Dawson MD;  [facility]     ENDARTERECTOMY FEMORAL  5/23/2014    Procedure: ENDARTERECTOMY FEMORAL;  Surgeon: Jason Joshi MD;  [facility]     ESOPHAGOSCOPY, GASTROSCOPY, DUODENOSCOPY (EGD), COMBINED  12/14/2012    Procedure: COMBINED ESOPHAGOSCOPY, GASTROSCOPY, DUODENOSCOPY (EGD), BIOPSY SINGLE OR MULTIPLE;  ESOPHAGOSCOPY, GASTROSCOPY, DUODENOSCOPY (EGD), DILATATION ;  Surgeon: Elizabeth Stevenson MD;  Location:  GI     ESOPHAGOSCOPY, GASTROSCOPY, DUODENOSCOPY (EGD), COMBINED  12/31/2013    Procedure: COMBINED ESOPHAGOSCOPY, GASTROSCOPY, DUODENOSCOPY (EGD), BIOPSY SINGLE OR MULTIPLE;;  Surgeon: Clemente Lopez MD;  Location:  GI     ESOPHAGOSCOPY, GASTROSCOPY, DUODENOSCOPY (EGD), COMBINED  4/1/2014    Procedure: COMBINED ESOPHAGOSCOPY, GASTROSCOPY, DUODENOSCOPY (EGD);;  Surgeon: Clemente Lopez MD;  [facility]     ESOPHAGOSCOPY, GASTROSCOPY, DUODENOSCOPY (EGD), COMBINED  6/28/2014    Procedure: COMBINED ESOPHAGOSCOPY, GASTROSCOPY, DUODENOSCOPY (EGD);  Surgeon: Clemente Lopez MD;  Location:  [facility]     ESOPHAGOSCOPY, GASTROSCOPY, DUODENOSCOPY (EGD), COMBINED N/A 8/20/2014    Procedure: COMBINED ESOPHAGOSCOPY, GASTROSCOPY, DUODENOSCOPY (EGD), BIOPSY SINGLE OR MULTIPLE;  Surgeon: Duane, William Charles, MD;  [facility]     ESOPHAGOSCOPY, GASTROSCOPY, DUODENOSCOPY (EGD), COMBINED N/A 8/22/2014    Procedure: COMBINED ESOPHAGOSCOPY,  GASTROSCOPY, DUODENOSCOPY (EGD), BIOPSY SINGLE OR MULTIPLE;  Surgeon: Mello Ferrer MD;  [facility]     ESOPHAGOSCOPY, GASTROSCOPY, DUODENOSCOPY (EGD), COMBINED N/A 10/2/2014    Procedure: COMBINED ESOPHAGOSCOPY, GASTROSCOPY, DUODENOSCOPY (EGD), BIOPSY SINGLE OR MULTIPLE;  Surgeon: Remy Haskins MD;  [facility]     ESOPHAGOSCOPY, GASTROSCOPY, DUODENOSCOPY (EGD), COMBINED Left 12/15/2014    Procedure: COMBINED ESOPHAGOSCOPY, GASTROSCOPY, DUODENOSCOPY (EGD), BIOPSY SINGLE OR MULTIPLE;  Surgeon: Remy Haskins MD;  [facility]     ESOPHAGOSCOPY, GASTROSCOPY, DUODENOSCOPY (EGD), COMBINED N/A 2/25/2015    Procedure: COMBINED ENDOSCOPIC ULTRASOUND, ESOPHAGOSCOPY, GASTROSCOPY, DUODENOSCOPY (EGD), FINE NEEDLE ASPIRATE/BIOPSY;  Surgeon: Clemente Lugo MD;  [facility]     ESOPHAGOSCOPY, GASTROSCOPY, DUODENOSCOPY (EGD), COMBINED Left 2/25/2015    Procedure: COMBINED ESOPHAGOSCOPY, GASTROSCOPY, DUODENOSCOPY (EGD), BIOPSY SINGLE OR MULTIPLE;  Surgeon: Clemente Lugo MD;  [facility]     ESOPHAGOSCOPY, GASTROSCOPY, DUODENOSCOPY (EGD), COMBINED N/A 9/25/2016    Procedure: COMBINED ESOPHAGOSCOPY, GASTROSCOPY, DUODENOSCOPY (EGD);  Surgeon: Aziza Patiño MD;  [facility]     ESOPHAGOSCOPY, GASTROSCOPY, DUODENOSCOPY (EGD), COMBINED N/A 1/18/2017    Procedure: COMBINED ESOPHAGOSCOPY, GASTROSCOPY, DUODENOSCOPY (EGD), BIOPSY SINGLE OR MULTIPLE;  Surgeon: Clemente Lopez MD;  [facility]     ESOPHAGOSCOPY, GASTROSCOPY, DUODENOSCOPY (EGD), COMBINED N/A 11/26/2017    Procedure: COMBINED ESOPHAGOSCOPY, GASTROSCOPY, DUODENOSCOPY (EGD), REMOVE FOREIGN BODY;  Esophagogastroduodenoscopy with foreign body extraction  ;  Surgeon: Herberth Castrejon MD;  [facility]     ESOPHAGOSCOPY, GASTROSCOPY, DUODENOSCOPY (EGD), COMBINED N/A 11/26/2017    Procedure: COMBINED ESOPHAGOSCOPY, GASTROSCOPY, DUODENOSCOPY (EGD), REMOVE FOREIGN BODY;;  Surgeon: Herberth Castrejon MD;  [facility]     ESOPHAGOSCOPY, GASTROSCOPY, DUODENOSCOPY (EGD), COMBINED N/A 4/10/2020    Procedure: ESOPHAGOGASTRODUODENOSCOPY (EGD);  Surgeon: Yael Cabral MD;  [facility]     FASCIOTOMY LOWER EXTREMITY Left 6/10/2016    Procedure: FASCIOTOMY LOWER EXTREMITY;  Surgeon: Mello Rodriguez MD;  [facility]     HC CAPSULE ENDOSCOPY N/A 8/25/2014    Procedure: CAPSULE/PILL CAM ENDOSCOPY;  Surgeon: Remy Haskins MD;  [facility]     HC CAPSULE ENDOSCOPY N/A 10/2/2014    Procedure: CAPSULE/PILL CAM ENDOSCOPY;  Surgeon: Remy Haskins MD;  [facility]     ORTHOPEDIC SURGERY      broken wrist repair     SEX TRANSFORMATION SURGERY, MALE TO FEMALE  1974 1974     SINUS SURGERY      cyst removed     TONSILLECTOMY       VASCULAR SURGERY  2006    Left carotid stent     Allergies   Allergen Reactions     Bee Venom Anaphylaxis     Penicillins Anaphylaxis     Dilantin [Phenytoin] Other (See Comments)     Generic dilantin only per pt     Iodine Hives     Novocaine [Procaine] Hives     Tositumomab Unknown     Medications: I have reviewed the patient's medication profile.      Thank you for involving us in the patient's care.   Clayton Guadarrama MD / Palliative Medicine / Text me via [facility] - search for '[place]' - then click the pager icon / My clinic is in the [facility]: [phone] (scheduling); [phone] (triage). Palliative care [facility] outpatient care coordinator is Ethel Quintana RN: [phone]. Palliative After-Hours Answering Service: [phone].

## 2020-08-11 ENCOUNTER — TELEPHONE (OUTPATIENT)
Dept: INTERNAL MEDICINE | Facility: CLINIC | Age: 70
End: 2020-08-11

## 2020-08-11 NOTE — TELEPHONE ENCOUNTER
Call back from RN,     Apply foam dressing to skin at pump site in right lower back for protection 2 times a week and PRN.   Dx: open wound care    Verbal okay was given for this order. Plans to fax order over for provider signature.     No further follow up needed.

## 2020-08-11 NOTE — TELEPHONE ENCOUNTER
Reason for Call:  Home Health Care    Gordon RN with [facility] at [facility], assisted living called, regarding verbal home care orders.    Orders are needed for this patient.    PT: n/a    OT: n/a    Skilled Nursing: Wound care, right lower back, former site of Baclofen pump.    2 x week wound/bandage care to change dressing.  Also as needed bandage change if it comes off.      Pt Provider: Dr. Allan Casey    Phone Number Homecare Nurse can be reached at: Gordon: [phone]    Can we leave a detailed message on this number? NO    Phone number patient can be reached at: n/a    Best Time: any    Call taken on 8/11/2020 at 1:15 PM by Geri Valdes

## 2020-08-12 DIAGNOSIS — H40.1133 PRIMARY OPEN ANGLE GLAUCOMA OF BOTH EYES, SEVERE STAGE: ICD-10-CM

## 2020-08-12 RX ORDER — DORZOLAMIDE HCL 20 MG/ML
SOLUTION/ DROPS OPHTHALMIC
Qty: 10 ML | Refills: 0 | OUTPATIENT
Start: 2020-08-12

## 2020-08-12 RX ORDER — BRIMONIDINE TARTRATE 2 MG/ML
SOLUTION/ DROPS OPHTHALMIC
Qty: 5 ML | Refills: 0 | OUTPATIENT
Start: 2020-08-12

## 2020-08-13 ENCOUNTER — DOCUMENTATION ONLY (OUTPATIENT)
Dept: OTHER | Facility: CLINIC | Age: 70
End: 2020-08-13

## 2020-08-13 DIAGNOSIS — H40.1133 PRIMARY OPEN ANGLE GLAUCOMA OF BOTH EYES, SEVERE STAGE: ICD-10-CM

## 2020-08-13 RX ORDER — DORZOLAMIDE HCL 20 MG/ML
SOLUTION/ DROPS OPHTHALMIC
Qty: 10 ML | Refills: 97
Start: 2020-08-13

## 2020-08-13 NOTE — TELEPHONE ENCOUNTER
dorzolamide (TRUSOPT) 2 % ophthalmic solution      Last ordered 2018  Last Office Visit : 9-24-19    Future Office visit:  11-    Routing refill request to provider for review/approval because:  Medication last ordered in 2018 and not addressed in last clinic note.

## 2020-08-13 NOTE — TELEPHONE ENCOUNTER
[facility] Call Center    Phone Message    May a detailed message be left on voicemail: yes     Reason for Call: Medication Refill Request    Has the patient contacted the pharmacy for the refill? Yes   Name of medication being requested: dorzolamide (TRUSOPT) 2 % ophthalmic solution  Provider who prescribed the medication: Sharda  Pharmacy: requesting escript to  longterm care pharmacy  Date medication is needed: ASAP         Action Taken: Message routed to:  [facility] E[facility]    Travel Screening: Not Applicable

## 2020-08-13 NOTE — TELEPHONE ENCOUNTER
Patient notified should be done through eye clinic and there is a refill request for these meds through them

## 2020-08-14 RX ORDER — DORZOLAMIDE HCL 20 MG/ML
1 SOLUTION/ DROPS OPHTHALMIC 2 TIMES DAILY
Qty: 1 BOTTLE | Refills: 1 | Status: SHIPPED | OUTPATIENT
Start: 2020-08-14 | End: 2021-11-01

## 2020-08-14 RX ORDER — BRIMONIDINE TARTRATE 2 MG/ML
1 SOLUTION/ DROPS OPHTHALMIC 2 TIMES DAILY
Qty: 5 ML | Refills: 4 | Status: SHIPPED | OUTPATIENT
Start: 2020-08-14 | End: 2021-06-04

## 2020-08-14 NOTE — TELEPHONE ENCOUNTER
LCV: 9/24/2019 Eye Clinic  last clinic note  Alphagan (Brimonidine) which is a purple top drop 2x/day (12 hours apart)   in the RIGHT EYE ONLY.    NCV:11-16-20

## 2020-08-14 NOTE — TELEPHONE ENCOUNTER
I will refill this script. Per last note, patient to be on Timolol/Dorzolamide though I called the pharmacy, the patient has never filled or received tho combination. Just Trusopt. Will refill and this can be discussed again at next appointment.    Grisel Thurman MD

## 2020-08-17 ENCOUNTER — TELEPHONE (OUTPATIENT)
Dept: INTERNAL MEDICINE | Facility: CLINIC | Age: 70
End: 2020-08-17

## 2020-08-17 NOTE — TELEPHONE ENCOUNTER
[facility] calling . Pt goes to pain clinic pt has pump in right hip . Pump is to be moved pt has upcoming preop with PCP Wed  . Nurse  Can see open wound with 2 inches below tubing sticking out of skin . RN asked she update the [facility] pain Clinic also who is to change this out . Any advise ment from PCP ?    [phone] nurses office .Kira Hare RN

## 2020-08-19 ENCOUNTER — OFFICE VISIT (OUTPATIENT)
Dept: INTERNAL MEDICINE | Facility: CLINIC | Age: 70
End: 2020-08-19
Payer: COMMERCIAL

## 2020-08-19 VITALS
HEIGHT: 55 IN | BODY MASS INDEX: 28.93 KG/M2 | RESPIRATION RATE: 15 BRPM | SYSTOLIC BLOOD PRESSURE: 98 MMHG | HEART RATE: 65 BPM | WEIGHT: 125 LBS | OXYGEN SATURATION: 100 % | DIASTOLIC BLOOD PRESSURE: 64 MMHG | TEMPERATURE: 97.6 F

## 2020-08-19 DIAGNOSIS — G89.4 CHRONIC PAIN SYNDROME: Chronic | ICD-10-CM

## 2020-08-19 DIAGNOSIS — I10 BENIGN ESSENTIAL HYPERTENSION: ICD-10-CM

## 2020-08-19 DIAGNOSIS — J30.9 ALLERGIC RHINITIS, UNSPECIFIED SEASONALITY, UNSPECIFIED TRIGGER: ICD-10-CM

## 2020-08-19 DIAGNOSIS — G40.909 SEIZURE DISORDER (H): ICD-10-CM

## 2020-08-19 DIAGNOSIS — Z01.818 PREOP GENERAL PHYSICAL EXAM: Primary | ICD-10-CM

## 2020-08-19 DIAGNOSIS — J44.9 CHRONIC OBSTRUCTIVE PULMONARY DISEASE, UNSPECIFIED COPD TYPE (H): ICD-10-CM

## 2020-08-19 DIAGNOSIS — S78.119A AMPUTATION ABOVE KNEE (H): ICD-10-CM

## 2020-08-19 DIAGNOSIS — E11.59 TYPE 2 DIABETES MELLITUS WITH OTHER CIRCULATORY COMPLICATION, WITHOUT LONG-TERM CURRENT USE OF INSULIN (H): ICD-10-CM

## 2020-08-19 LAB
ANION GAP SERPL CALCULATED.3IONS-SCNC: 4 MMOL/L (ref 3–14)
BUN SERPL-MCNC: 12 MG/DL (ref 7–30)
CALCIUM SERPL-MCNC: 8.9 MG/DL (ref 8.5–10.1)
CHLORIDE SERPL-SCNC: 106 MMOL/L (ref 94–109)
CO2 SERPL-SCNC: 24 MMOL/L (ref 20–32)
CREAT SERPL-MCNC: 0.52 MG/DL (ref 0.52–1.04)
GFR SERPL CREATININE-BSD FRML MDRD: >90 ML/MIN/{1.73_M2}
GLUCOSE SERPL-MCNC: 80 MG/DL (ref 70–99)
HBA1C MFR BLD: 5 % (ref 0–5.6)
HGB BLD-MCNC: 13 G/DL (ref 11.7–15.7)
POTASSIUM SERPL-SCNC: 4.2 MMOL/L (ref 3.4–5.3)
SODIUM SERPL-SCNC: 134 MMOL/L (ref 133–144)

## 2020-08-19 PROCEDURE — 85018 HEMOGLOBIN: CPT | Performed by: INTERNAL MEDICINE

## 2020-08-19 PROCEDURE — 80048 BASIC METABOLIC PNL TOTAL CA: CPT | Performed by: INTERNAL MEDICINE

## 2020-08-19 PROCEDURE — 93000 ELECTROCARDIOGRAM COMPLETE: CPT | Performed by: INTERNAL MEDICINE

## 2020-08-19 PROCEDURE — 99215 OFFICE O/P EST HI 40 MIN: CPT | Mod: 25 | Performed by: INTERNAL MEDICINE

## 2020-08-19 PROCEDURE — 36415 COLL VENOUS BLD VENIPUNCTURE: CPT | Performed by: INTERNAL MEDICINE

## 2020-08-19 PROCEDURE — 83036 HEMOGLOBIN GLYCOSYLATED A1C: CPT | Performed by: INTERNAL MEDICINE

## 2020-08-19 RX ORDER — FLUTICASONE PROPIONATE 50 MCG
2 SPRAY, SUSPENSION (ML) NASAL DAILY
Qty: 15.8 ML | Refills: 1 | Status: SHIPPED | OUTPATIENT
Start: 2020-08-19 | End: 2020-10-27

## 2020-08-19 RX ORDER — FLUTICASONE PROPIONATE 50 MCG
SPRAY, SUSPENSION (ML) NASAL
COMMUNITY
Start: 2020-07-05 | End: 2020-08-19

## 2020-08-19 ASSESSMENT — MIFFLIN-ST. JEOR: SCORE: 733.63

## 2020-08-19 NOTE — PROGRESS NOTES
[address] [address]  Phone: [phone]  Primary Provider: Arben Casey  Pre-op Performing Provider: ARBEN CASEY    PREOPERATIVE EVALUATION:  Today's date: 8/19/2020    Sonya Foote is a 70 year old female who presents for a preoperative evaluation.    Surgical Information:  Surgery Details 8/19/2020   Surgery/Procedure: Pump pocket revison   Surgery Location: [facility]   Surgeon: Dr. Trejo   Surgery Date: TBD   Where patient plans to recover: At a nursing home     Fax number for surgical facility: [phone]   Type of Anesthesia Anticipated: MAC Sedation     Subjective     HPI related to upcoming procedure: Pain pump replacement as pain pump has changed position and now poking skin.  Patient states pain clinic aware of such and needs surgery to repair.    Preop Questions 8/19/2020   Have you ever had a heart attack or stroke? YES  -    Have you ever had surgery on your heart or blood vessels, such as a stent placement, a coronary artery bypass, or surgery on an artery in your head, neck, heart, or legs? YES:    Do you have chest pain with activity? No   Do you have a history of  heart failure? YES -    Do you currently have a cold, bronchitis or symptoms of other infection? No   Do you have a cough, shortness of breath, or wheezing? No   Do you or anyone in your family have previous history of blood clots? YES -    Do you or does anyone in your family have a serious bleeding problem such as prolonged bleeding following surgeries or cuts? No   Have you ever had problems with anemia or been told to take iron pills? YES -    Have you had any abnormal blood loss such as black, tarry or bloody stools, or abnormal vaginal bleeding? No   Have you ever had a blood transfusion? YES -    Have you ever had a transfusion reaction? No   Are you willing to have a blood transfusion if it is medically needed before, during, or after your  surgery? Yes   Have you or any of your relatives ever had problems with anesthesia? No   Do you have sleep apnea, excessive snoring or daytime drowsiness? YES -    Do you have a CPAP machine? No   Do you have any artifical heart valves or other implanted medical devices like a pacemaker, defibrillator, or continuous glucose monitor? No   Do you have artificial joints? No   Are you allergic to latex? No   Is there any chance that you may be pregnant? No       RX monitoring program ([place]NPMP) reviewed:  reviewed- no concerns    See problem list for active medical problems.  Problems all longstanding and stable, except as noted/documented.  See ROS for pertinent symptoms related to these conditions.    Review of Systems  CONSTITUTIONAL: NEGATIVE for fever, chills, change in weight  ENT/MOUTH: NEGATIVE for ear, mouth and throat problems  RESP: NEGATIVE for significant cough or SOB  CV: NEGATIVE for chest pain, palpitations or peripheral edema  GI: NEGATIVE for nausea, abdominal pain, heartburn, or change in bowel habits  : NEGATIVE for frequency, dysuria, or hematuria  HEME: NEGATIVE for bleeding problems  PSYCHIATRIC: NEGATIVE for changes in mood or affect    Patient Active Problem List    Diagnosis Date Noted     Chest pain on breathing 06/10/2020     Priority: Medium     Neurogenic bladder 05/21/2020     Priority: Medium     Morbid obesity (H) 05/14/2020     Priority: Medium     History of blood transfusion      Priority: Medium     x5; no adverse reactions       COPD (chronic obstructive pulmonary disease) (H)      Priority: Medium     Type 2 diabetes mellitus (H)      Priority: Medium     Benign essential hypertension      Priority: Medium     History of stroke      Priority: Medium     residual left-sided weakness       Sickle cell trait (H)      Priority: Medium     MDD (major depressive disorder)      Priority: Medium     Seizure disorder (H)      Priority: Medium     many years since last grand mal; daily,  brief petit mals       Chronic back pain      Priority: Medium     Chronic neck pain      Priority: Medium     ROGER (generalized anxiety disorder)      Priority: Medium     Person who has had sex change operation      Priority: Medium     male to female       Osteoporosis      Priority: Medium     PAD (peripheral artery disease) (H)      Priority: Medium     Peripheral neuropathy      Priority: Medium     JOSE (obstructive sleep apnea)      Priority: Medium     Amputation above knee (H)      Priority: Medium     bilateral       Blind left eye      Priority: Medium     due to central retinal artery occlusion       Acid reflux disease      Priority: Medium     Hx of carotid artery stenosis      Priority: Medium     s/p left carotid stent in 2006       Complex sleep apnea syndrome      Priority: Medium     Dysphagia      Priority: Medium     chronic due to esophageal strictures and multiple previous dilitations        CAD (coronary artery disease)      Priority: Medium     UA and inferior MI in 2016 s/p PCI       Chronic pain syndrome 03/20/2009     Priority: Medium     Has a fentanyl PUMP    Patient is followed by Allan Casey for ongoing prescription of pain meds  All refills should be approved by this provider, or covering partner.    Maximum quantity per month: 1 month  Clinic visit frequency required: Q 6  months     Controlled substance agreement:  Encounter-Level CSA:     There are no encounter-level csa.        Pain Clinic evaluation in the past: No    DIRE Total Score(s):  No flowsheet data found.    Last [facility] website verification:  6/6/18   [email or web address]       History of central retinal artery occlusion 2006     Priority: Medium     left-sided        Past Medical History:   Diagnosis Date     Acid reflux disease      Amputation above knee (H)     bilateral     Benign essential hypertension      Blind left eye     due to central retinal artery occlusion     CAD (coronary artery disease)     UA  and inferior MI in 2016 s/p PCI     Chronic back pain      Chronic neck pain      Chronic pain syndrome     with fentanyl intrathecal pain pump     Complex sleep apnea syndrome      COPD (chronic obstructive pulmonary disease) (H)      Dysphagia     chronic due to esophageal strictures and multiple previous dilitations      ROGER (generalized anxiety disorder)      History of blood transfusion     x5; no adverse reactions     History of central retinal artery occlusion 2006    left-sided     History of stroke     residual left-sided weakness     Hx of carotid artery stenosis     s/p left carotid stent in 2006 and balloon angioplasty in 2016     MDD (major depressive disorder)      Neurogenic bladder     SPT in place     JOSE (obstructive sleep apnea)      Osteoporosis      PAD (peripheral artery disease) (H)      Peripheral neuropathy      Person who has had sex change operation     male to female     Seizure disorder (H)     many years since last grand mal; daily, brief petit mals     Sickle cell trait (H)      Type 2 diabetes mellitus (H)      Past Surgical History:   Procedure Laterality Date     AMPUTATE LEG ABOVE KNEE Left 6/11/2016    Procedure: AMPUTATE LEG ABOVE KNEE;  Surgeon: Mello Rodriguez MD;  [facility]     AMPUTATE LEG BELOW KNEE Right 11/7/2016    Procedure: AMPUTATE LEG BELOW KNEE;  Surgeon: Savannah Durant MD;  [facility]     AMPUTATE REVISION STUMP LOWER EXTREMITY Right 11/11/2016    Procedure: AMPUTATE REVISION STUMP LOWER EXTREMITY;  Surgeon: Savannah Durant MD;  [facility]     AMPUTATE REVISION STUMP LOWER EXTREMITY Right 11/16/2016    Procedure: AMPUTATE REVISION STUMP LOWER EXTREMITY;  Surgeon: Savannah Durant MD;  [facility]     AMPUTATE TOE(S) Right 1/5/2016    Procedure: AMPUTATE TOE(S);  Surgeon: Mello Gaines DPM;  Location:  [facility]     ANGIOGRAM Bilateral 11/21/2014    Procedure: ANGIOGRAM;  Surgeon: Savannah Durant MD;  [facility]     ANGIOGRAM Left 1/16/2015     Procedure: ANGIOGRAM;  Surgeon: Savannah Durant MD;  [facility]     ANGIOGRAM Bilateral 9/14/2015    Procedure: ANGIOGRAM;  Surgeon: Savannah Durant MD;  [facility]     ANGIOGRAM Left 10/12/2015    Procedure: ANGIOGRAM;  Surgeon: Savannah Durant MD;  [facility]     ANGIOGRAM Right 6/6/2016    Procedure: ANGIOGRAM;  Surgeon: Savannah Durant MD;  [facility]     ANGIOPLASTY Right 6/6/2016    Procedure: ANGIOPLASTY;  Surgeon: Savannah Durant MD;  [facility]     APPENDECTOMY       BREAST BIOPSY, RT/LT Right     benign     CATARACT IOL, RT/LT Right      CHOLECYSTECTOMY       COLONOSCOPY N/A 8/25/2014    Procedure: COLONOSCOPY;  Surgeon: Mello Ferrer MD;  [facility]     COLONOSCOPY WITH CO2 INSUFFLATION N/A 8/20/2014    Procedure: COLONOSCOPY WITH CO2 INSUFFLATION;  Surgeon: Duane, William Charles, MD;  Location: [facility]     CYSTOSCOPY, INJECT BOTOX N/A 5/21/2020    Procedure: CYSTOSCOPY, WITH BOTULINUM TOXIN INJECTION;  Surgeon: Loki Gordon MD;  [facility]     CYSTOSCOPY, INTRAVESICAL INJECTION N/A 10/31/2019    Procedure: CYSTOSCOPY, BOTOX INJECTION, Suprapubic Catheter Exchange;  Surgeon: Loki Gordon MD;  [facility]     CYSTOSTOMY, INSERT TUBE SUPRAPUBIC, COMBINED N/A 1/16/2018    Procedure: COMBINED CYSTOSTOMY, INSERT TUBE SUPRAPUBIC;  Cystoscopy, Intraoperative Ultrasound, Suprapubic Tube Placement;  Surgeon: Keanu Dawson MD;  [facility]     ENDARTERECTOMY FEMORAL  5/23/2014    Procedure: ENDARTERECTOMY FEMORAL;  Surgeon: Jason Joshi MD;  [facility]     ESOPHAGOSCOPY, GASTROSCOPY, DUODENOSCOPY (EGD), COMBINED  12/14/2012    Procedure: COMBINED ESOPHAGOSCOPY, GASTROSCOPY, DUODENOSCOPY (EGD), BIOPSY SINGLE OR MULTIPLE;  ESOPHAGOSCOPY, GASTROSCOPY, DUODENOSCOPY (EGD), DILATATION ;  Surgeon: Elizabeth Stevenson MD;  Location:  GI     ESOPHAGOSCOPY, GASTROSCOPY, DUODENOSCOPY (EGD), COMBINED  12/31/2013    Procedure: COMBINED  ESOPHAGOSCOPY, GASTROSCOPY, DUODENOSCOPY (EGD), BIOPSY SINGLE OR MULTIPLE;;  Surgeon: Clemente Lopez MD;  [facility]     ESOPHAGOSCOPY, GASTROSCOPY, DUODENOSCOPY (EGD), COMBINED  4/1/2014    Procedure: COMBINED ESOPHAGOSCOPY, GASTROSCOPY, DUODENOSCOPY (EGD);;  Surgeon: Clemente Lopez MD;  [facility]     ESOPHAGOSCOPY, GASTROSCOPY, DUODENOSCOPY (EGD), COMBINED  6/28/2014    Procedure: COMBINED ESOPHAGOSCOPY, GASTROSCOPY, DUODENOSCOPY (EGD);  Surgeon: Clemente Lopez MD;  [facility]     ESOPHAGOSCOPY, GASTROSCOPY, DUODENOSCOPY (EGD), COMBINED N/A 8/20/2014    Procedure: COMBINED ESOPHAGOSCOPY, GASTROSCOPY, DUODENOSCOPY (EGD), BIOPSY SINGLE OR MULTIPLE;  Surgeon: Duane, William Charles, MD;  Location:  OR     ESOPHAGOSCOPY, GASTROSCOPY, DUODENOSCOPY (EGD), COMBINED N/A 8/22/2014    Procedure: COMBINED ESOPHAGOSCOPY, GASTROSCOPY, DUODENOSCOPY (EGD), BIOPSY SINGLE OR MULTIPLE;  Surgeon: Mello Ferrer MD;  [facility]     ESOPHAGOSCOPY, GASTROSCOPY, DUODENOSCOPY (EGD), COMBINED N/A 10/2/2014    Procedure: COMBINED ESOPHAGOSCOPY, GASTROSCOPY, DUODENOSCOPY (EGD), BIOPSY SINGLE OR MULTIPLE;  Surgeon: Remy Haskins MD;  [facility]     ESOPHAGOSCOPY, GASTROSCOPY, DUODENOSCOPY (EGD), COMBINED Left 12/15/2014    Procedure: COMBINED ESOPHAGOSCOPY, GASTROSCOPY, DUODENOSCOPY (EGD), BIOPSY SINGLE OR MULTIPLE;  Surgeon: Remy Haskins MD;  [facility]     ESOPHAGOSCOPY, GASTROSCOPY, DUODENOSCOPY (EGD), COMBINED N/A 2/25/2015    Procedure: COMBINED ENDOSCOPIC ULTRASOUND, ESOPHAGOSCOPY, GASTROSCOPY, DUODENOSCOPY (EGD), FINE NEEDLE ASPIRATE/BIOPSY;  Surgeon: Clemente Lugo MD;  [facility]     ESOPHAGOSCOPY, GASTROSCOPY, DUODENOSCOPY (EGD), COMBINED Left 2/25/2015    Procedure: COMBINED ESOPHAGOSCOPY, GASTROSCOPY, DUODENOSCOPY (EGD), BIOPSY SINGLE OR MULTIPLE;  Surgeon: Clemente Lugo MD;  Location:  GI     ESOPHAGOSCOPY, GASTROSCOPY, DUODENOSCOPY (EGD), COMBINED N/A  9/25/2016    Procedure: COMBINED ESOPHAGOSCOPY, GASTROSCOPY, DUODENOSCOPY (EGD);  Surgeon: Aziza Patiño MD;  [facility]     ESOPHAGOSCOPY, GASTROSCOPY, DUODENOSCOPY (EGD), COMBINED N/A 1/18/2017    Procedure: COMBINED ESOPHAGOSCOPY, GASTROSCOPY, DUODENOSCOPY (EGD), BIOPSY SINGLE OR MULTIPLE;  Surgeon: Clemente Lopez MD;  [facility]     ESOPHAGOSCOPY, GASTROSCOPY, DUODENOSCOPY (EGD), COMBINED N/A 11/26/2017    Procedure: COMBINED ESOPHAGOSCOPY, GASTROSCOPY, DUODENOSCOPY (EGD), REMOVE FOREIGN BODY;  Esophagogastroduodenoscopy with foreign body extraction  ;  Surgeon: Herberth Castrejon MD;  [facility]     ESOPHAGOSCOPY, GASTROSCOPY, DUODENOSCOPY (EGD), COMBINED N/A 11/26/2017    Procedure: COMBINED ESOPHAGOSCOPY, GASTROSCOPY, DUODENOSCOPY (EGD), REMOVE FOREIGN BODY;;  Surgeon: Herberth Castrejon MD;  [facility]     ESOPHAGOSCOPY, GASTROSCOPY, DUODENOSCOPY (EGD), COMBINED N/A 4/10/2020    Procedure: ESOPHAGOGASTRODUODENOSCOPY (EGD);  Surgeon: Yael Cabral MD;  [facility]     FASCIOTOMY LOWER EXTREMITY Left 6/10/2016    Procedure: FASCIOTOMY LOWER EXTREMITY;  Surgeon: Mello Rodriguez MD;  [facility]     HC CAPSULE ENDOSCOPY N/A 8/25/2014    Procedure: CAPSULE/PILL CAM ENDOSCOPY;  Surgeon: Remy Haskins MD;  [facility]     HC CAPSULE ENDOSCOPY N/A 10/2/2014    Procedure: CAPSULE/PILL CAM ENDOSCOPY;  Surgeon: Remy Haskins MD;  [facility]     ORTHOPEDIC SURGERY      broken wrist repair     SEX TRANSFORMATION SURGERY, MALE TO FEMALE  1974 1974     SINUS SURGERY      cyst removed     TONSILLECTOMY       VASCULAR SURGERY  2006    Left carotid stent     Current Outpatient Medications   Medication Sig Dispense Refill     ADVAIR DISKUS 250-50 MCG/DOSE inhaler INHALE 1 PUFF BY MOUTH TWICE DAILY 1 Inhaler 5     albuterol (PROVENTIL) (2.5 MG/3ML) 0.083% neb solution INHALE 1 VIAL VIA NEBULIZER EVERY 6 HOURS AS NEEDED 360 mL 11     alendronate (FOSAMAX) 70  MG tablet TAKE ONE TABLET BY MOUTH EVERY 7 DAYS (TAKE WITH 8OZ OF WATER 30 MINUTES BEFORE BREAKFAST AND REMAIN UPRIGHT DURING THIS TIME) 4 tablet 97     ASPERCREME LIDOCAINE 4 % Patch APPLY 1 PATCH TOPICALLY TO LOWER BACK ONCE DAILY (ON FOR 12 HOURS, OFF FOR 12 HOURS) 30 patch 10     ASPIRIN LOW DOSE 81 MG chewable tablet CHEW AND SWALLOW ONE TABLET BY MOUTH IN THE MORNING 28 tablet 10     atorvastatin (LIPITOR) 40 MG tablet TAKE 1 TABLET BY MOUTH AT BEDTIME 28 tablet 10     blood glucose monitoring (ONE TOUCH ULTRA 2) meter device kit Use to test blood sugars 3 times daily or as directed. 1 kit 0     blood glucose monitoring (ONE TOUCH ULTRA) test strip Use to test blood sugars 3 times daily or as directed. 3 Box 3     blood glucose monitoring (ONE TOUCH ULTRASOFT) lancets Use to test blood sugar 3 times daily or as directed. 100 each PRN     brimonidine (ALPHAGAN) 0.2 % ophthalmic solution Place 1 drop into the right eye 2 times daily ( 12 hours apart).    Please keep 11-16-20 appt for refills. 5 mL 4     capsaicin-menthol-methyl sal 0.025-1-12 % external cream Apply 1 Application topically daily as needed (topical pain) 56.6 g 1     diclofenac (VOLTAREN) 1 % topical gel APPLY 2GM TOPICALLY TO AFFECTED AREA(S) FOUR TIMES A  g 3     dorzolamide (TRUSOPT) 2 % ophthalmic solution Place 1 drop into the right eye 2 times daily 1 Bottle 1     escitalopram (LEXAPRO) 10 MG tablet TAKE 1 TABLET BY MOUTH ONCE DAILY 28 tablet 10     FEROSUL 325 (65 Fe) MG tablet TAKE 1 TABLET BY MOUTH TWICE DAILY WITH MEALS 56 tablet 10     fluticasone (FLONASE) 50 MCG/ACT nasal spray Spray 2 sprays into both nostrils daily 15.8 mL 1     INCRUSE ELLIPTA 62.5 MCG/INH Inhaler INHALE 1 PUFF BY MOUTH ONCE DAILY 30 each 10     lactulose (CHRONULAC) 10 GM/15ML solution Take 30 mLs (20 g) by mouth as needed for constipation 236 mL 0     latanoprost (XALATAN) 0.005 % ophthalmic solution Place 1 drop into both eyes At Bedtime 2.5 mL 11      levETIRAcetam (KEPPRA) 500 MG tablet TAKE 1 TABLET BY MOUTH TWICE DAILY 56 tablet 11     lisinopril (ZESTRIL) 20 MG tablet TAKE 1 TABLET BY MOUTH EVERY MORNING 28 tablet 11     loratadine (CLARITIN) 10 MG tablet TAKE 1 TABLET BY MOUTH ONCE DAILY 28 tablet 11     Menthol, Topical Analgesic, 5 % GEL Externally apply topically daily as needed 113 g 0     metFORMIN (GLUCOPHAGE) 500 MG tablet TAKE 1 TABLET BY MOUTH EVERY EVENING WITH DINNER 28 tablet 11     metoprolol succinate ER (TOPROL-XL) 50 MG 24 hr tablet TAKE 1 TABLET BY MOUTH EVERY MORNING 30 tablet 10     Multiple Vitamins-Minerals (TAB-A-MACY) TABS TAKE 1 TABLET BY MOUTH ONCE DAILY 28 tablet 11     multivitamin, therapeutic (THERA-VIT) TABS tablet Take 1 tablet by mouth daily       mupirocin (BACTROBAN) 2 % external ointment Apply topically daily Apply to Nares       nicotine (COMMIT) 2 MG lozenge Place 1 lozenge (2 mg) inside cheek every hour as needed for smoking cessation 60 lozenge 11     nicotine (NICODERM CQ) 14 MG/24HR 24 hr patch Place 1 patch onto the skin every 24 hours 30 patch 11     nitroGLYcerin (NITROSTAT) 0.4 MG sublingual tablet DISSOLVE ONE TABLET UNDER TONGUE AS NEEDED FOR CHEST PAIN MAY REPEAT EVERY 5 MINUTES FOR 3 DOSES, IF SYMPTOMS PERSIST CALL 911 25 tablet 3     Nutritional Supplements (ENSURE NUTRITION SHAKE) LIQD Take 1 Bottle by mouth 2 times daily With meals 60 Bottle 0     pantoprazole (PROTONIX) 40 MG EC tablet TAKE 1 TABLET BY MOUTH EVERY MORNING 28 tablet 11     phenytoin (DILANTIN) 100 MG capsule TAKE 2 CAPS (200MG) BY MOUTH TWICE A  capsule 11     phenytoin sodium extended (DILANTIN) 200 MG capsule TAKE 1 CAPSULE BY MOUTH TWICE DAILY 56 capsule 11     polyethylene glycol (MIRALAX) 17 GM/SCOOP powder MIX 17GM OF POWDER IN 8OZ OF WATER UNTIL COMPLETELY DISSOLVED. DRINK SOLUTION BY MOUTH IN THE MORNING 510 g 8     pregabalin (LYRICA) 150 MG capsule TAKE 1 CAPSULE BY MOUTH THREE TIMES DAILY 90 capsule 4     risperiDONE  "(RISPERDAL) 0.5 MG tablet TAKE 1 TABLET BY MOUTH AT BEDTIME 28 tablet 4     SENEXON-S 8.6-50 MG tablet TAKE 1 TABLET BY MOUTH TWICE DAILY 56 tablet 10     sennosides (SENOKOT) 8.6 MG tablet Take 1 tablet by mouth 2 times daily        solifenacin (VESICARE) 10 MG tablet TAKE 1 TABLET BY MOUTH EVERY MORNING 28 tablet 10     traZODone (DESYREL) 150 MG tablet TAKE 1 TABLET BY MOUTH AT BEDTIME 28 tablet 10     vitamin D3 (CHOLECALCIFEROL) 10 MCG (400 UNIT) capsule Take 2 capsules by mouth daily       Vitamin D3 (VITAMIN D) 10 MCG (400 UNIT) tablet TAKE TWO TABLETS (800 UNITS) BY MOUTH ONCE DAILY 56 tablet 11       Allergies   Allergen Reactions     Bee Venom Anaphylaxis     Penicillins Anaphylaxis     Dilantin [Phenytoin] Other (See Comments)     Generic dilantin only per pt     Iodine Hives     Novocaine [Procaine] Hives     Tositumomab Unknown        Social History     Tobacco Use     Smoking status: Current Every Day Smoker     Packs/day: 1.50     Years: 30.00     Pack years: 45.00     Types: Cigarettes, Cigars     Smokeless tobacco: Never Used     Tobacco comment: 1.5 packs per day    Substance Use Topics     Alcohol use: No     Alcohol/week: 0.0 standard drinks     History   Drug Use No            Objective   BP 98/64   Pulse 65   Temp 97.6  F (36.4  C) (Temporal)   Resp 15   Ht 1.092 m (3' 7\")   Wt 56.7 kg (125 lb)   SpO2 100%   BMI 47.53 kg/m    Physical Exam    GENERAL APPEARANCE:  alert and mild pain     EYES: EOMI, PERRL     HENT: ear canals and TM's normal and nose and mouth without ulcers or lesions     NECK: no adenopathy, no asymmetry, masses, or scars and thyroid normal to palpation     RESP: lungs clear to auscultation - no rales, rhonchi or wheezes     CV: regular rates and rhythm, normal S1 S2, no S3 or S4 and no click or rub     ABDOMEN:  soft, nontender, no HSM or masses and bowel sounds normal     MS: Noted wheelchair with bilateral lower extremity amputations, pain pump site not viewed " again.     NEURO: No focal changes     PSYCH: mentation appears normal and affect normal/bright per baseline    Recent Labs   Lab Test 06/12/20  0421 06/11/20  0548 06/11/20  0000  12/16/19  1258  04/17/19  1511   HGB 11.2* 11.7  --    < >  --    < >  --     287  --    < >  --    < >  --    INR  --  1.12 1.06  --   --    < >  --     137 137   < >  --    < > 138   POTASSIUM 3.8 4.6 4.2   < >  --    < > 4.1   CR 0.49* 0.57 0.51*   < >  --    < > 0.70   A1C  --   --   --   --  5.0  --  5.0    < > = values in this interval not displayed.        PRE-OP Diagnostics:  Labs pending at this time.  Results will be reviewed when available.  EKG  Repeated: There is a normal sinus rhythm with a sinus bradycardia and a heart rate of 58 noted.  There is nonspecific ST changes noted which are likely repolarization variants.  This EKG was compared to that which was done on May 14 of 2020 and appears to be essentially unchanged.     Echocardiogram done 6/2020 with no change clinical status.  See below:    Interpretation Summary Echocardiogram 6/11/20  Global and regional left ventricular function is normal with an EF of 60-65%.  No regional wall motion abnormalities are seen.  Right ventricular function, chamber size, wall motion, and thickness are  normal.  No hemodynamically significant valvular abnoramalities were noted.  The inferior vena cava was normal in size with preserved respiratory  variability.  No pericardial effusion is present.     This study was compared with the study from 11/13/2019 . There has been no  change.     Assessment & Plan   The proposed surgical procedure is considered LOW risk.    REVISED CARDIAC RISK INDEX  The patient has the following serious cardiovascular risks for perioperative complications:  Coronary Artery Disease (MI, positive stress test, angina, Qs on EKG) = 1 point  Diabetes Mellitus (on Insulin) = 1 point    INTERPRETATION: 2 points: Class III (moderate risk - 6.6% complication  rate)    -Wheelchair-bound.    1. Preop general physical exam  Surgery as scheduled and discussed with patient routine follow-up    2. Chronic pain syndrome  Ongoing issues with chronic pain with recent  reviewed    3. Type 2 diabetes mellitus with other circulatory complication, without long-term current use of insulin (H)  Lab Results   Component Value Date    A1C 5.0 12/16/2019    A1C 5.0 04/17/2019    A1C 5.1 06/06/2018    A1C 4.4 05/02/2018    A1C 4.5 08/24/2017   Stable and discussed holding a.m. blood sugar medications while n.p.o.    4. Chronic obstructive pulmonary disease, unspecified COPD type (H)  Continuing with current clinical inhalers as noted    5. Seizure disorder (H)  Stable and advised to dose meds if clinical seizures without significant change    6. Amputation above knee (H)  Note baseline bilateral amputation    7. Benign essential hypertension  Stable on therapy continue with current medical management    The patient has the following additional risks and recommendations for perioperative complications:     - No identified additional risk factors other than previously addressed     MEDICATION INSTRUCTIONS:    Anticoagulant or Antiplatelet Medication Use   - Bleeding risk is low for this procedure (e.g. dental, skin, cataract).  Would advise holding all anticoagulants per surgery recommendations    DIABETIC Medications:   - METFORMIN: HOLD day of surgery.(AM)    RECOMMENDATION:  APPROVAL GIVEN to proceed with proposed procedure, without further diagnostic evaluation.    Labs reviewed and forms + EKG will be faxed.    Return for f/u with surgery for routine post-op.     Addendum:    Please note I received a call from patient today about increasing pain in the question about the functionality of the pain pump and its skin concerns.  I did call Dr. Trejo' at his pain clinic to try to get in contact with him to discuss with his nurse need for the patient to be seen sooner rather than later  try to correct this issue and it is my understanding that he will be attempting to contact the patient.    Signed Electronically by: Allan Casey MD    Copy of this evaluation report is provided to requesting physician, Dr. Maya Llamas [facility] Guidelines    Revised Cardiac Risk Index

## 2020-08-20 ENCOUNTER — TELEPHONE (OUTPATIENT)
Dept: INTERNAL MEDICINE | Facility: CLINIC | Age: 70
End: 2020-08-20

## 2020-08-20 NOTE — TELEPHONE ENCOUNTER
Attempted to call back Gordon/nurse to ensure patient is going to ER but no answer. ER was already advised.

## 2020-08-20 NOTE — PROGRESS NOTES
Manually faxed Pre-op to [facility] at [phone].

## 2020-08-20 NOTE — TELEPHONE ENCOUNTER
Gordon informed but then he transferred me to a different nurse. Patient did not want to go to ER. Nurse let her know risks of not going in. Sonya called the pain clinic and they scheduled her surgery for Monday. Sonya wants [facility] to be able to do the surgery. Surgery is at the [facility] on Monday. They got a script for pain meds for 20 tabs of oxycodone from the pain clinic. It will get delivered this evening. Now saying Pain pump is somewhat working but is very intermittent and not delivering full dose. Pain clinic knows it has been moved and poking through. Concerned if it is even delivering meds in the right spot. Pain clinic aware that pump is sticking through, tubing sticking through. Says provider yesterday did not look at it. Says pain clinic knows pump is somewhat working and is ok with her taking oxycodone on top of this. Wanting to know if we are in coordination with the pain clinic. Should we be calling pain clinic? Very concerned for her waiting for Monday for surgery and also regarding pain pump still delivering medicine potentially to wrong place and not correctly. and also now getting oxycodone.    Nurse from facility said we could try patient too at number listed for her to advise ER ourselves as well. Left message for patient to call back.

## 2020-08-20 NOTE — TELEPHONE ENCOUNTER
Please note I contacted the [facility] pain clinic to get a hold of Dr. Trejo.  I did discuss with the nurse who will relay the message.  I relayed my concern regarding the issue of the patient's increased pain and concern for pain pump functionality as well as repair.

## 2020-08-20 NOTE — TELEPHONE ENCOUNTER
Spoke with Gordon and another nurse. Reports patient is in severe pain from how the pain pump is penetrating her skin. Had preop yesterday. 12/10 pain due to pump poking through skin. Pump is poking through her skin so went to pain clinic. They want to move it from low back gluteal area to her front. Was like this yesterday too at appt. Pain clinic is supposed to call her back to schedule her surgery. In a wheelchair so makes no sense to be in area with constant pressure. Needs pain medication because in severe pain. Pump delivers medication directly to her spine. Doing wound care to site of pump. Changing dressing. Homecare nursing helping to manage her wound. Has a couple small holes where it has poked through. Rates pain 12/10. Cartridges managed thru pain clinic. They are unsure how much pain meds she gets through her pump , now are saying pump is not working and is not delivering meds either. Unsure how long it has not been working. Is supposed to deliver fentanyl and morphine into spine, programmed with a lockout for pushing. Symptoms since the 17th. Discovered 1 cm area on 8/17 that was open from where pain pump is. They tried contacting [facility] pain clinic but are not getting any responses.    Advised patient should go to ER immediately.    FYI to PCP and is there any way to add pain pump to med list?

## 2020-08-20 NOTE — TELEPHONE ENCOUNTER
Pt called back.    States she spoke with clinic of Dr. Trejo already.    No further action needed at this time.

## 2020-08-20 NOTE — TELEPHONE ENCOUNTER
Dr. Casey has left message for pain doctor Dr. Morrison to call him and patient.    Informed nurse from facility of this and below message. She said she informed someone from pain clinic that pump was intermittently malfunctioning per patient. Left message for Dr. Trejo's nurse to call back as well to inform them of Dr. Casey's concerns and if Dr. Morrison is really ok with patient taking oxycodone tonight? Nurse from facility said they were aware and they prescribed oxycodone but want more of a plan from them. Also asked nurse from facility to tell patient to answer her phone as Dr. Morrison is supposed to be trying to call her.

## 2020-08-20 NOTE — TELEPHONE ENCOUNTER
Reason for Call:  Other Patient request    Detailed comments: Patient's home healthcare worker Solomon called, he stated that the patient's pain management pump connection isn't working.  Patient is in a lot of pain. Patient needs a new pain management pump sent to her home ASAP.    Phone Number Patient can be reached at: Other phone number:  [phone]    Best Time: Anytime-- ASAP    Can we leave a detailed message on this number? NO    Call taken on 8/20/2020 at 9:50 AM by Jack Hale

## 2020-08-20 NOTE — TELEPHONE ENCOUNTER
Patient's pain pump issues and pain control will need to be managed through her pain clinic.  If severe agree with ER assessment

## 2020-08-20 NOTE — TELEPHONE ENCOUNTER
Spoke with Dr. Morrison's nurse. They have been in contact with facility nurse. Patient is scheduled for surgery on Monday. Says it is ok to take oxycodone still even with pain pump delivering meds because pump meds do not cross blood brain barrier. Thinks its still running. They will make sure catheter still functioning. If it did fall out meds would just be going in subQ area and not urgent issue. Will not get overdosed. Jessica (a provider there) talked to facility nurse. That nurse was given instructions to Keep it covered. Says pump is still working and she could not get it too much medicine. Patient has been having tenderness for awhile. They are not needing anything further from us.

## 2020-08-20 NOTE — TELEPHONE ENCOUNTER
I was aware that the pump was in this condition and patient stated that pain clinic was also aware per patient.  Patient informed me that pain pump was not functioning at the time of clinic visit.  If pump is now functioning there would be concern for potential pain medicine being delivered to the wrong place or too much medicine in combination with oral tablets given.  Again if patient is concerned advised that she may benefit from being seen in the ER

## 2020-08-24 ENCOUNTER — TELEPHONE (OUTPATIENT)
Dept: INTERNAL MEDICINE | Facility: CLINIC | Age: 70
End: 2020-08-24

## 2020-08-24 ENCOUNTER — TRANSFERRED RECORDS (OUTPATIENT)
Dept: HEALTH INFORMATION MANAGEMENT | Facility: CLINIC | Age: 70
End: 2020-08-24

## 2020-08-24 ENCOUNTER — PREP FOR PROCEDURE (OUTPATIENT)
Dept: UROLOGY | Facility: CLINIC | Age: 70
End: 2020-08-24

## 2020-08-24 DIAGNOSIS — N31.9 NEUROGENIC BLADDER: Primary | ICD-10-CM

## 2020-08-24 RX ORDER — CIPROFLOXACIN 2 MG/ML
400 INJECTION, SOLUTION INTRAVENOUS EVERY 12 HOURS
Status: CANCELLED | OUTPATIENT
Start: 2020-08-24

## 2020-08-24 NOTE — TELEPHONE ENCOUNTER
Reason for Call: Request for an order or referral:    Order or referral being requested: 1x a week for 2 more weeks    Date needed: as soon as possible    Has the patient been seen by the PCP for this problem? YES    Additional comments: Rocio of OT homecare wants extended orders to help with adaptive equipment and safety with transfers.    Phone number Patient can be reached at:  Other phone number:  [phone]    Best Time:  ASAP    Can we leave a detailed message on this number?  YES    Call taken on 8/24/2020 at 8:50 AM by Rosangela Soto

## 2020-08-24 NOTE — TELEPHONE ENCOUNTER
LM for Rocio MONTGOMERY    Informed approved per standing orders. HC staff will fax these orders for MD signature.    Valeria CACERESN, RN, PHN

## 2020-08-25 RX ORDER — OXYCODONE HYDROCHLORIDE 5 MG/1
TABLET ORAL
Qty: 40 TABLET | Refills: 0 | OUTPATIENT
Start: 2020-08-25

## 2020-08-31 VITALS — WEIGHT: 125 LBS | BODY MASS INDEX: 28.93 KG/M2 | HEIGHT: 55 IN

## 2020-08-31 ASSESSMENT — MIFFLIN-ST. JEOR: SCORE: 733.63

## 2020-08-31 NOTE — PROGRESS NOTES
"Sonya Foote is a 70 year old female who is being evaluated via a billable telephone visit.      The patient has been notified of following:     \"This telephone visit will be conducted via a call between you and your physician/provider. We have found that certain health care needs can be provided without the need for a physical exam.  This service lets us provide the care you need with a short phone conversation.  If a prescription is necessary we can send it directly to your pharmacy.  If lab work is needed we can place an order for that and you can then stop by our lab to have the test done at a later time.    Telephone visits are billed at different rates depending on your insurance coverage. During this emergency period, for some insurers they may be billed the same as an in-person visit.  Please reach out to your insurance provider with any questions.    If during the course of the call the physician/provider feels a telephone visit is not appropriate, you will not be charged for this service.\"    Patient has given verbal consent for Telephone visit?  Yes    What phone number would you like to be contacted at? [phone]    How would you like to obtain your AVS? Mail a copy     Nalini Dee        Obstructive Sleep Apnea - PAP Follow-Up Visit:    Chief Complaint   Patient presents with     Sleep Problem       Sonya Foote for follow-up of their sleep apnea, comorbid COPD managed with nothing at this time.     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 Did have Restless Legs Syndrome before amputation but not anymore.     Sleep history:   10/26/2012 - Polysomnography at [facility] -  min; AHI 14; RDI 22; ERIN 5/hr; No PLMs or RBD.    11/16/2012 - Titration Polysomnography at " [facility] - Started on CPAP and developed treatment-emergent central apneas. Adaptive Servo-Ventilation titration optimal but did well on CPAP 8 as well (at least REM supine per comments).     Put on CPAP 8 cmH2O.     Initially presented to [facility] 2017 on CPAP 8 cmH20 and elevated AHI 9.7 on download. Not a candidate for ASV due to reduced ejection fraction.     2/26/2017 - Titration Polysomnography at the [facility] (130#)  minutes. CPAP 5 - 12 cmH2O tried. Unacceptable titration due to high residual AHI, however, oxygen desaturations and events were better controlled on CPAP of 10 cmH2O or higher. However AHI was >20 and predominantly obstructive events were scored.     She was changed to CPAP 12 cmH2O.    She received a new machine 7/2019 and lost it due to non-compliance    Echo 11/2019  Global and regional left ventricular function is normal with an EF of 60-65%.  Pulmonary artery systolic pressure is normal.    Study Date: 11/20/2019 (130.0 lbs) Titrated CPAP 5 to 12 cmH2O. Supine REM was seen at 11 cmH20 with fair control of events, but persistent RERAs, snoring, and airflow limitation. Events often appeared periodic, and cheyyne-fang morphology was suggested at times, but periodicity was not well consolidated and circulation times were not delayed.     Recommend treatment of JOSE with Autotitrating PAP therapy with a range of 12 cmH2O to 15 cmH2O    She lost her machine due to non-compliance    Uses [facility]    She has not been able to get a titration study done yet due to COVID restrictions    She had a pain pump removed and and anew one put in last week    Total score - [place]: 18 (8/31/2020  1:09 PM)  EUGENIA Total Score: 23      Falls asleep during the day  Has trouble falling asleep  She gets in bed at 8 PM and falls asleep in 30-60 minutes. She has pain in back and has to take medications every hour. She admits to trouble turning her mind off. She has a  hospital bed. She does not watch TV or use electronics in bed.    She sleep 2-3 hours and then wakes up due to pain and sometimes trouble breathing. She has trouble falling back asleep due to pain and a litlle bit of an overactive brains She will stare at the wall or take a nebulizer or go outside and get fresh air.     She will get back to sleep between 3-5 AM and usually starts her day at 8 AM, gets out of bed about 8:30    She awakens 4-5 times a night.     She feels asleep 2-3 times a day for 2-3 hours. She is sedentary. She is legally blind.           Past medical/surgical history, family history, social history, medications and allergies were reviewed.      Problem List:  Patient Active Problem List    Diagnosis Date Noted     Chronic pain syndrome 03/20/2009     Priority: High     Has a fentanyl PUMP    Patient is followed by Allan Casey for ongoing prescription of pain meds  All refills should be approved by this provider, or covering partner.    Maximum quantity per month: 1 month  Clinic visit frequency required: Q 6  months     Controlled substance agreement:  Encounter-Level CSA:     There are no encounter-level csa.        Pain Clinic evaluation in the past: No    DIRE Total Score(s):  No flowsheet data found.    Last [facility] website verification:  6/6/18   [email or web address]       Chest pain on breathing 06/10/2020     Priority: Medium     Neurogenic bladder 05/21/2020     Priority: Medium     Morbid obesity (H) 05/14/2020     Priority: Medium     History of blood transfusion      Priority: Medium     x5; no adverse reactions       COPD (chronic obstructive pulmonary disease) (H)      Priority: Medium     Type 2 diabetes mellitus (H)      Priority: Medium     Benign essential hypertension      Priority: Medium     History of stroke      Priority: Medium     residual left-sided weakness       Sickle cell trait (H)      Priority: Medium     MDD (major depressive disorder)      Priority:  "Medium     Seizure disorder (H)      Priority: Medium     many years since last grand mal; daily, brief petit mals       Chronic back pain      Priority: Medium     Chronic neck pain      Priority: Medium     ROGER (generalized anxiety disorder)      Priority: Medium     Person who has had sex change operation      Priority: Medium     male to female       Osteoporosis      Priority: Medium     PAD (peripheral artery disease) (H)      Priority: Medium     Peripheral neuropathy      Priority: Medium     JOSE (obstructive sleep apnea)      Priority: Medium     Amputation above knee (H)      Priority: Medium     bilateral       Blind left eye      Priority: Medium     due to central retinal artery occlusion       Acid reflux disease      Priority: Medium     Hx of carotid artery stenosis      Priority: Medium     s/p left carotid stent in 2006       Complex sleep apnea syndrome      Priority: Medium     Dysphagia      Priority: Medium     chronic due to esophageal strictures and multiple previous dilitations        CAD (coronary artery disease)      Priority: Medium     UA and inferior MI in 2016 s/p PCI       History of central retinal artery occlusion 2006     Priority: Medium     left-sided          Ht 1.092 m (3' 7\")   Wt 56.7 kg (125 lb)   BMI 47.53 kg/m      Impression/Plan:      Mild Sleep obstructive sleep apnea by sleep study 2012.  History of complex apnea with 'treatment emergent central apneas' by titration study 2012. Treated with initially ASV, later PAP after EF worsened. Study 2/2017 suggested severe incompletely treated obstructive sleep apnea at CPAP 12 cmH20.  More recent study 11/2019 with acceptable titration, but probable underlying periodic breathing. Multiple comorbidities.     She remains off CPAP after losing her machine for non-adherence due to nasal congestion and the fact her total sleep time is short (5-6 hours) under best of circumstances related to her chronic pain.     When covid lab " restrictions are eased: suggested she call every 6 weeks   Polysomnogram with all night titration of bilevelPAP starting at 10/7 cmH20. Could consider use of ASV if this is ineffective because of significant exacerbation of [place]. Last study was done at [place], patient claims independence in cares, no tech notes suggest otherwise from last study  - Leaks are high, may benefit from PAP nap or mask fitting at some point to reasess      Chronic insomnia  Suspect Psychophysiologic insomnia, pain, and disrupted circadian rhythm all playing a role   - Recommend increased activity during day and avoiding daytime sleep  - Recommend restricting time in bed to 8 hours 12AM - 8 AM  - She is interested in some further help  - Referred to Dr. Campos for cognitive behavioral training     >Twenty-five minutes spent with patient

## 2020-09-01 ENCOUNTER — VIRTUAL VISIT (OUTPATIENT)
Dept: SLEEP MEDICINE | Facility: CLINIC | Age: 70
End: 2020-09-01
Payer: COMMERCIAL

## 2020-09-01 DIAGNOSIS — Z20.822 COVID-19 RULED OUT: ICD-10-CM

## 2020-09-01 DIAGNOSIS — F51.04 CHRONIC INSOMNIA: ICD-10-CM

## 2020-09-01 DIAGNOSIS — G89.4 CHRONIC PAIN SYNDROME: Chronic | ICD-10-CM

## 2020-09-01 DIAGNOSIS — G47.39 COMPLEX SLEEP APNEA SYNDROME: Primary | ICD-10-CM

## 2020-09-01 PROCEDURE — 99214 OFFICE O/P EST MOD 30 MIN: CPT | Mod: TEL | Performed by: INTERNAL MEDICINE

## 2020-09-04 ENCOUNTER — OFFICE VISIT (OUTPATIENT)
Dept: PULMONOLOGY | Facility: CLINIC | Age: 70
End: 2020-09-04
Attending: STUDENT IN AN ORGANIZED HEALTH CARE EDUCATION/TRAINING PROGRAM
Payer: COMMERCIAL

## 2020-09-04 VITALS
RESPIRATION RATE: 18 BRPM | OXYGEN SATURATION: 92 % | SYSTOLIC BLOOD PRESSURE: 104 MMHG | TEMPERATURE: 98.2 F | DIASTOLIC BLOOD PRESSURE: 66 MMHG | BODY MASS INDEX: 47.53 KG/M2 | HEART RATE: 72 BPM | WEIGHT: 125 LBS

## 2020-09-04 DIAGNOSIS — Z72.0 CURRENT TOBACCO USE: ICD-10-CM

## 2020-09-04 DIAGNOSIS — J44.9 CHRONIC OBSTRUCTIVE PULMONARY DISEASE, UNSPECIFIED COPD TYPE (H): Primary | ICD-10-CM

## 2020-09-04 LAB
DLCOUNC-%PRED-PRE: 63 %
DLCOUNC-PRE: 13.67 ML/MIN/MMHG
DLCOUNC-PRED: 21.42 ML/MIN/MMHG
ERV-%PRED-PRE: 52 %
ERV-PRE: 0.56 L
ERV-PRED: 1.06 L
EXPTIME-PRE: 5.5 SEC
FEF2575-%PRED-PRE: 141 %
FEF2575-PRE: 2.62 L/SEC
FEF2575-PRED: 1.86 L/SEC
FEFMAX-%PRED-PRE: 78 %
FEFMAX-PRE: 4.72 L/SEC
FEFMAX-PRED: 6.04 L/SEC
FEV1-%PRED-PRE: 109 %
FEV1-PRE: 2.46 L
FEV1FEV6-PRE: 82 %
FEV1FEV6-PRED: 79 %
FEV1FVC-PRE: 84 %
FEV1FVC-PRED: 78 %
FEV1SVC-PRE: 95 %
FEV1SVC-PRED: 65 %
FIFMAX-PRE: 3.38 L/SEC
FVC-%PRED-PRE: 102 %
FVC-PRE: 2.94 L
FVC-PRED: 2.88 L
IC-%PRED-PRE: 85 %
IC-PRE: 2.02 L
IC-PRED: 2.36 L
MEP-PRE: 51 CMH2O
MIP-PRE: -27 CMH2O
VA-%PRED-PRE: 72 %
VA-PRE: 3.87 L
VC-%PRED-PRE: 75 %
VC-PRE: 2.58 L
VC-PRED: 3.42 L

## 2020-09-04 PROCEDURE — G0463 HOSPITAL OUTPT CLINIC VISIT: HCPCS | Mod: 25,ZF

## 2020-09-04 ASSESSMENT — PAIN SCALES - GENERAL: PAINLEVEL: EXTREME PAIN (8)

## 2020-09-04 NOTE — PROGRESS NOTES
Pulmonary Clinic Follow-Up Visit Note    CC: Chronic Bronchitis Follow Up    HPI: 69 yo W w/ Hx of CAD s/p MI, CHF (EF 60-65%), PAD s/p bilateral AKAs, CVA x3, JOSE (off CPAP, awaiting repeat study study), GERD with consequent esophageal stricture, hiatal hernia, epilepsy, sickle cell trait, androgen insensitivy syndrome, chronic pain with fentanyl pump, blindness - presents for follow up of chronic bronchitis and chronic cough. Last seen by Dr. Toy Sandhu 6/24/20. At that time was complaining of chest hurting with movement and was having an ongoing foamy white cough. She was on advair, incurse and albuterol nebs. She had restarted smoking. She was noted to not have obstruction on most recent 2015 PFTs. The plan was to repeat PFTs, pursue cardiology follow up for CHF as it was though that she did not have COPD per PFTs and her cough could be CHF-related. She did see palliative care. She also saw sleep who noted she is off her auto-titrating PAP due to non adherence is awaiting the sleep lab to open again for a PSG with night titration.     Today she reports, no improvement in symptoms. She continues to smoke. She has persistent white sputum and cough. She has dyspnea when trying to ambulate with her prosthesis. No wheezing. No edema. She does have persistent reflux.     PMH:  Past Medical History:   Diagnosis Date     Acid reflux disease      Amputation above knee (H)     bilateral     Benign essential hypertension      Blind left eye     due to central retinal artery occlusion     CAD (coronary artery disease)     UA and inferior MI in 2016 s/p PCI     Chronic back pain      Chronic neck pain      Chronic pain syndrome     with fentanyl intrathecal pain pump     Complex sleep apnea syndrome      COPD (chronic obstructive pulmonary disease) (H)      Dysphagia     chronic due to esophageal strictures and multiple previous dilitations      ROGER (generalized anxiety disorder)      History of blood transfusion     x5; no  adverse reactions     History of central retinal artery occlusion 2006    left-sided     History of stroke     residual left-sided weakness     Hx of carotid artery stenosis     s/p left carotid stent in 2006 and balloon angioplasty in 2016     MDD (major depressive disorder)      Neurogenic bladder     SPT in place     JOSE (obstructive sleep apnea)      Osteoporosis      PAD (peripheral artery disease) (H)      Peripheral neuropathy      Person who has had sex change operation     male to female     Seizure disorder (H)     many years since last grand mal; daily, brief petit mals     Sickle cell trait (H)      Type 2 diabetes mellitus (H)      PSH:  Past Surgical History:   Procedure Laterality Date     AMPUTATE LEG ABOVE KNEE Left 6/11/2016    Procedure: AMPUTATE LEG ABOVE KNEE;  Surgeon: Mello Rodriguez MD;  [facility]     AMPUTATE LEG BELOW KNEE Right 11/7/2016    Procedure: AMPUTATE LEG BELOW KNEE;  Surgeon: Savannah Durant MD;  [facility]     AMPUTATE REVISION STUMP LOWER EXTREMITY Right 11/11/2016    Procedure: AMPUTATE REVISION STUMP LOWER EXTREMITY;  Surgeon: Savannah Durant MD;  [facility]     AMPUTATE REVISION STUMP LOWER EXTREMITY Right 11/16/2016    Procedure: AMPUTATE REVISION STUMP LOWER EXTREMITY;  Surgeon: Savannah Durant MD;  [facility]     AMPUTATE TOE(S) Right 1/5/2016    Procedure: AMPUTATE TOE(S);  Surgeon: Mello Gaines DPM;  Location:  [facility]     ANGIOGRAM Bilateral 11/21/2014    Procedure: ANGIOGRAM;  Surgeon: Savannah Durant MD;  [facility]     ANGIOGRAM Left 1/16/2015    Procedure: ANGIOGRAM;  Surgeon: Savannah Durant MD;  [facility]     ANGIOGRAM Bilateral 9/14/2015    Procedure: ANGIOGRAM;  Surgeon: Savannah Durant MD;  [facility]     ANGIOGRAM Left 10/12/2015    Procedure: ANGIOGRAM;  Surgeon: Savannah Durant MD;  [facility]     ANGIOGRAM Right 6/6/2016    Procedure: ANGIOGRAM;  Surgeon: Savannah Durant MD;  [facility]     ANGIOPLASTY Right 6/6/2016     Procedure: ANGIOPLASTY;  Surgeon: Savannah Durant MD;  [facility]     APPENDECTOMY       BREAST BIOPSY, RT/LT Right     benign     CATARACT IOL, RT/LT Right      CHOLECYSTECTOMY       COLONOSCOPY N/A 8/25/2014    Procedure: COLONOSCOPY;  Surgeon: Mello Ferrer MD;  [facility]     COLONOSCOPY WITH CO2 INSUFFLATION N/A 8/20/2014    Procedure: COLONOSCOPY WITH CO2 INSUFFLATION;  Surgeon: Duane, William Charles, MD;  [facility]     CYSTOSCOPY, INJECT BOTOX N/A 5/21/2020    Procedure: CYSTOSCOPY, WITH BOTULINUM TOXIN INJECTION;  Surgeon: Loki Gordon MD;  [facility]     CYSTOSCOPY, INTRAVESICAL INJECTION N/A 10/31/2019    Procedure: CYSTOSCOPY, BOTOX INJECTION, Suprapubic Catheter Exchange;  Surgeon: Loki Gordon MD;  [facility]     CYSTOSTOMY, INSERT TUBE SUPRAPUBIC, COMBINED N/A 1/16/2018    Procedure: COMBINED CYSTOSTOMY, INSERT TUBE SUPRAPUBIC;  Cystoscopy, Intraoperative Ultrasound, Suprapubic Tube Placement;  Surgeon: Keanu Dawson MD;  [facility]     ENDARTERECTOMY FEMORAL  5/23/2014    Procedure: ENDARTERECTOMY FEMORAL;  Surgeon: Jason Joshi MD;  [facility]     ESOPHAGOSCOPY, GASTROSCOPY, DUODENOSCOPY (EGD), COMBINED  12/14/2012    Procedure: COMBINED ESOPHAGOSCOPY, GASTROSCOPY, DUODENOSCOPY (EGD), BIOPSY SINGLE OR MULTIPLE;  ESOPHAGOSCOPY, GASTROSCOPY, DUODENOSCOPY (EGD), DILATATION ;  Surgeon: Elizabeth Stevenson MD;  Location:  [facility]     ESOPHAGOSCOPY, GASTROSCOPY, DUODENOSCOPY (EGD), COMBINED  12/31/2013    Procedure: COMBINED ESOPHAGOSCOPY, GASTROSCOPY, DUODENOSCOPY (EGD), BIOPSY SINGLE OR MULTIPLE;;  Surgeon: Clemente Lopez MD;  [facility]     ESOPHAGOSCOPY, GASTROSCOPY, DUODENOSCOPY (EGD), COMBINED  4/1/2014    Procedure: COMBINED ESOPHAGOSCOPY, GASTROSCOPY, DUODENOSCOPY (EGD);;  Surgeon: Clemente Lopez MD;  [facility]     ESOPHAGOSCOPY, GASTROSCOPY, DUODENOSCOPY (EGD), COMBINED  6/28/2014    Procedure: COMBINED  ESOPHAGOSCOPY, GASTROSCOPY, DUODENOSCOPY (EGD);  Surgeon: Clemente Lopez MD;  [facility]     ESOPHAGOSCOPY, GASTROSCOPY, DUODENOSCOPY (EGD), COMBINED N/A 8/20/2014    Procedure: COMBINED ESOPHAGOSCOPY, GASTROSCOPY, DUODENOSCOPY (EGD), BIOPSY SINGLE OR MULTIPLE;  Surgeon: Duane, William Charles, MD;  [facility]     ESOPHAGOSCOPY, GASTROSCOPY, DUODENOSCOPY (EGD), COMBINED N/A 8/22/2014    Procedure: COMBINED ESOPHAGOSCOPY, GASTROSCOPY, DUODENOSCOPY (EGD), BIOPSY SINGLE OR MULTIPLE;  Surgeon: Mello Ferrer MD;  [facility]     ESOPHAGOSCOPY, GASTROSCOPY, DUODENOSCOPY (EGD), COMBINED N/A 10/2/2014    Procedure: COMBINED ESOPHAGOSCOPY, GASTROSCOPY, DUODENOSCOPY (EGD), BIOPSY SINGLE OR MULTIPLE;  Surgeon: Remy Haskins MD;  [facility]     ESOPHAGOSCOPY, GASTROSCOPY, DUODENOSCOPY (EGD), COMBINED Left 12/15/2014    Procedure: COMBINED ESOPHAGOSCOPY, GASTROSCOPY, DUODENOSCOPY (EGD), BIOPSY SINGLE OR MULTIPLE;  Surgeon: Remy Haskins MD;  [facility]     ESOPHAGOSCOPY, GASTROSCOPY, DUODENOSCOPY (EGD), COMBINED N/A 2/25/2015    Procedure: COMBINED ENDOSCOPIC ULTRASOUND, ESOPHAGOSCOPY, GASTROSCOPY, DUODENOSCOPY (EGD), FINE NEEDLE ASPIRATE/BIOPSY;  Surgeon: Clemente Lugo MD;  [facility]     ESOPHAGOSCOPY, GASTROSCOPY, DUODENOSCOPY (EGD), COMBINED Left 2/25/2015    Procedure: COMBINED ESOPHAGOSCOPY, GASTROSCOPY, DUODENOSCOPY (EGD), BIOPSY SINGLE OR MULTIPLE;  Surgeon: Clemente Lugo MD;  [facility]     ESOPHAGOSCOPY, GASTROSCOPY, DUODENOSCOPY (EGD), COMBINED N/A 9/25/2016    Procedure: COMBINED ESOPHAGOSCOPY, GASTROSCOPY, DUODENOSCOPY (EGD);  Surgeon: Aziza Patiño MD;  [facility]     ESOPHAGOSCOPY, GASTROSCOPY, DUODENOSCOPY (EGD), COMBINED N/A 1/18/2017    Procedure: COMBINED ESOPHAGOSCOPY, GASTROSCOPY, DUODENOSCOPY (EGD), BIOPSY SINGLE OR MULTIPLE;  Surgeon: Clemente Lopez MD;  Location:  [facility]     ESOPHAGOSCOPY, GASTROSCOPY, DUODENOSCOPY (EGD), COMBINED  N/A 11/26/2017    Procedure: COMBINED ESOPHAGOSCOPY, GASTROSCOPY, DUODENOSCOPY (EGD), REMOVE FOREIGN BODY;  Esophagogastroduodenoscopy with foreign body extraction  ;  Surgeon: Herberth Castrejon MD;  [facility]     ESOPHAGOSCOPY, GASTROSCOPY, DUODENOSCOPY (EGD), COMBINED N/A 11/26/2017    Procedure: COMBINED ESOPHAGOSCOPY, GASTROSCOPY, DUODENOSCOPY (EGD), REMOVE FOREIGN BODY;;  Surgeon: Herberth Castrejon MD;  [facility]     ESOPHAGOSCOPY, GASTROSCOPY, DUODENOSCOPY (EGD), COMBINED N/A 4/10/2020    Procedure: ESOPHAGOGASTRODUODENOSCOPY (EGD);  Surgeon: Yael Cabral MD;  [facility]     FASCIOTOMY LOWER EXTREMITY Left 6/10/2016    Procedure: FASCIOTOMY LOWER EXTREMITY;  Surgeon: Mello Rodriguez MD;  [facility]     HC CAPSULE ENDOSCOPY N/A 8/25/2014    Procedure: CAPSULE/PILL CAM ENDOSCOPY;  Surgeon: Remy Haskins MD;  [facility]     HC CAPSULE ENDOSCOPY N/A 10/2/2014    Procedure: CAPSULE/PILL CAM ENDOSCOPY;  Surgeon: Remy Haskins MD;  [facility]     ORTHOPEDIC SURGERY      broken wrist repair     REVISE CATHETER INTRATHECAL  08/24/2020     SEX TRANSFORMATION SURGERY, MALE TO FEMALE  1974 1974     SINUS SURGERY      cyst removed     TONSILLECTOMY       VASCULAR SURGERY  2006    Left carotid stent     Allergies:  Allergies   Allergen Reactions     Bee Venom Anaphylaxis     Penicillins Anaphylaxis     Dilantin [Phenytoin] Other (See Comments)     Generic dilantin only per pt     Iodine Hives     Novocaine [Procaine] Hives     Tositumomab Unknown     Social History:  Social History     Socioeconomic History     Marital status:      Spouse name: Not on file     Number of children: 2     Years of education: Not on file     Highest education level: Not on file   Occupational History     Occupation: Retired   Social Needs     Financial resource strain: Not on file     Food insecurity     Worry: Not on file     Inability: Not on file     Transportation  needs     Medical: Not on file     Non-medical: Not on file   Tobacco Use     Smoking status: Current Every Day Smoker     Packs/day: 1.50     Years: 30.00     Pack years: 45.00     Types: Cigarettes, Cigars     Smokeless tobacco: Never Used     Tobacco comment: 1.5 packs per day    Substance and Sexual Activity     Alcohol use: No     Alcohol/week: 0.0 standard drinks     Drug use: No     Sexual activity: Not Currently   Lifestyle     Physical activity     Days per week: Not on file     Minutes per session: Not on file     Stress: Not on file   Relationships     Social connections     Talks on phone: Not on file     Gets together: Not on file     Attends Mandaeism service: Not on file     Active member of club or organization: Not on file     Attends meetings of clubs or organizations: Not on file     Relationship status: Not on file     Intimate partner violence     Fear of current or ex partner: Not on file     Emotionally abused: Not on file     Physically abused: Not on file     Forced sexual activity: Not on file   Other Topics Concern     Parent/sibling w/ CABG, MI or angioplasty before 65F 55M? Not Asked      Service Not Asked     Blood Transfusions Not Asked     Caffeine Concern No     Comment: 1 in the morning     Occupational Exposure Not Asked     Hobby Hazards Not Asked     Sleep Concern Not Asked     Stress Concern Not Asked     Weight Concern Not Asked     Special Diet Not Asked     Back Care Not Asked     Exercise Not Asked     Bike Helmet Not Asked     Seat Belt Not Asked     Self-Exams Not Asked   Social History Narrative    Living in a nursing home (as of 2020) for prosthetic fitting and therapy; usually resides in an [facility].    Two adopted children (Kam and Prashanth) and 3 grandchildren (as of 2020).     Medications:  Current Outpatient Medications   Medication Sig Dispense Refill     ADVAIR DISKUS 250-50 MCG/DOSE inhaler INHALE 1 PUFF BY MOUTH TWICE DAILY 1 Inhaler 5     albuterol  (PROVENTIL) (2.5 MG/3ML) 0.083% neb solution INHALE 1 VIAL VIA NEBULIZER EVERY 6 HOURS AS NEEDED 360 mL 11     alendronate (FOSAMAX) 70 MG tablet TAKE ONE TABLET BY MOUTH EVERY 7 DAYS (TAKE WITH 8OZ OF WATER 30 MINUTES BEFORE BREAKFAST AND REMAIN UPRIGHT DURING THIS TIME) 4 tablet 97     ASPERCREME LIDOCAINE 4 % Patch APPLY 1 PATCH TOPICALLY TO LOWER BACK ONCE DAILY (ON FOR 12 HOURS, OFF FOR 12 HOURS) 30 patch 10     ASPIRIN LOW DOSE 81 MG chewable tablet CHEW AND SWALLOW ONE TABLET BY MOUTH IN THE MORNING 28 tablet 10     atorvastatin (LIPITOR) 40 MG tablet TAKE 1 TABLET BY MOUTH AT BEDTIME 28 tablet 10     blood glucose monitoring (ONE TOUCH ULTRA 2) meter device kit Use to test blood sugars 3 times daily or as directed. 1 kit 0     blood glucose monitoring (ONE TOUCH ULTRA) test strip Use to test blood sugars 3 times daily or as directed. 3 Box 3     blood glucose monitoring (ONE TOUCH ULTRASOFT) lancets Use to test blood sugar 3 times daily or as directed. 100 each PRN     brimonidine (ALPHAGAN) 0.2 % ophthalmic solution Place 1 drop into the right eye 2 times daily ( 12 hours apart).    Please keep 11-16-20 appt for refills. 5 mL 4     capsaicin-menthol-methyl sal 0.025-1-12 % external cream Apply 1 Application topically daily as needed (topical pain) 56.6 g 1     diclofenac (VOLTAREN) 1 % topical gel APPLY 2GM TOPICALLY TO AFFECTED AREA(S) FOUR TIMES A  g 3     dorzolamide (TRUSOPT) 2 % ophthalmic solution Place 1 drop into the right eye 2 times daily 1 Bottle 1     escitalopram (LEXAPRO) 10 MG tablet TAKE 1 TABLET BY MOUTH ONCE DAILY 28 tablet 10     FEROSUL 325 (65 Fe) MG tablet TAKE 1 TABLET BY MOUTH TWICE DAILY WITH MEALS 56 tablet 10     fluticasone (FLONASE) 50 MCG/ACT nasal spray Spray 2 sprays into both nostrils daily 15.8 mL 1     INCRUSE ELLIPTA 62.5 MCG/INH Inhaler INHALE 1 PUFF BY MOUTH ONCE DAILY 30 each 10     lactulose (CHRONULAC) 10 GM/15ML solution Take 30 mLs (20 g) by mouth as needed  for constipation 236 mL 0     latanoprost (XALATAN) 0.005 % ophthalmic solution Place 1 drop into both eyes At Bedtime 2.5 mL 11     levETIRAcetam (KEPPRA) 500 MG tablet TAKE 1 TABLET BY MOUTH TWICE DAILY 56 tablet 11     lisinopril (ZESTRIL) 20 MG tablet TAKE 1 TABLET BY MOUTH EVERY MORNING 28 tablet 11     loratadine (CLARITIN) 10 MG tablet TAKE 1 TABLET BY MOUTH ONCE DAILY 28 tablet 11     Menthol, Topical Analgesic, 5 % GEL Externally apply topically daily as needed 113 g 0     metFORMIN (GLUCOPHAGE) 500 MG tablet TAKE 1 TABLET BY MOUTH EVERY EVENING WITH DINNER 28 tablet 11     metoprolol succinate ER (TOPROL-XL) 50 MG 24 hr tablet TAKE 1 TABLET BY MOUTH EVERY MORNING 30 tablet 10     Multiple Vitamins-Minerals (TAB-A-MACY) TABS TAKE 1 TABLET BY MOUTH ONCE DAILY 28 tablet 11     multivitamin, therapeutic (THERA-VIT) TABS tablet Take 1 tablet by mouth daily       nicotine (COMMIT) 2 MG lozenge Place 1 lozenge (2 mg) inside cheek every hour as needed for smoking cessation 60 lozenge 11     nicotine (NICODERM CQ) 14 MG/24HR 24 hr patch Place 1 patch onto the skin every 24 hours 30 patch 11     nitroGLYcerin (NITROSTAT) 0.4 MG sublingual tablet DISSOLVE ONE TABLET UNDER TONGUE AS NEEDED FOR CHEST PAIN MAY REPEAT EVERY 5 MINUTES FOR 3 DOSES, IF SYMPTOMS PERSIST CALL 911 25 tablet 3     Nutritional Supplements (ENSURE NUTRITION SHAKE) LIQD Take 1 Bottle by mouth 2 times daily With meals 60 Bottle 0     pantoprazole (PROTONIX) 40 MG EC tablet TAKE 1 TABLET BY MOUTH EVERY MORNING 28 tablet 11     phenytoin (DILANTIN) 100 MG capsule TAKE 2 CAPS (200MG) BY MOUTH TWICE A  capsule 11     phenytoin sodium extended (DILANTIN) 200 MG capsule TAKE 1 CAPSULE BY MOUTH TWICE DAILY 56 capsule 11     polyethylene glycol (MIRALAX) 17 GM/SCOOP powder MIX 17GM OF POWDER IN 8OZ OF WATER UNTIL COMPLETELY DISSOLVED. DRINK SOLUTION BY MOUTH IN THE MORNING 510 g 8     pregabalin (LYRICA) 150 MG capsule TAKE 1 CAPSULE BY MOUTH THREE  TIMES DAILY 90 capsule 4     risperiDONE (RISPERDAL) 0.5 MG tablet TAKE 1 TABLET BY MOUTH AT BEDTIME 28 tablet 4     SENEXON-S 8.6-50 MG tablet TAKE 1 TABLET BY MOUTH TWICE DAILY 56 tablet 10     sennosides (SENOKOT) 8.6 MG tablet Take 1 tablet by mouth 2 times daily        solifenacin (VESICARE) 10 MG tablet TAKE 1 TABLET BY MOUTH EVERY MORNING 28 tablet 10     traZODone (DESYREL) 150 MG tablet TAKE 1 TABLET BY MOUTH AT BEDTIME 28 tablet 10     vitamin D3 (CHOLECALCIFEROL) 10 MCG (400 UNIT) capsule Take 2 capsules by mouth daily       Vitamin D3 (VITAMIN D) 10 MCG (400 UNIT) tablet TAKE TWO TABLETS (800 UNITS) BY MOUTH ONCE DAILY 56 tablet 11     Family History:  Family History   Problem Relation Age of Onset     Dementia Mother      Diabetes Mother         type unknown     Coronary Artery Disease Mother         MI     Glaucoma Father      Diabetes Father         type unknown     Heart Failure Father      Schizophrenia Brother      Depression Brother      Suicide Sister      Depression Sister      Diabetes Sister      Ovarian Cancer Maternal Aunt      Leukemia Maternal Aunt      Diabetes Maternal Grandmother      Diabetes Maternal Grandfather      Diabetes Paternal Grandmother      Diabetes Paternal Grandfather      Breast Cancer Sister      Cerebrovascular Disease Brother      Colon Cancer No family hx of      Macular Degeneration No family hx of        Physical Exam:  /66   Pulse 72   Temp 98.2  F (36.8  C) (Oral)   Resp 18   Wt 56.7 kg (125 lb)   SpO2 92%   BMI 47.53 kg/m      General: Sitting in the chair in NAD  HEENT: anicteric, moist mucosa  Neck: no palpable lymphadenopathy, no JVD noted  Chest: CTAB, no wheezing  Cardiac: RRR no murmurs  Abdomen: Soft, flat, non tender, active BS  Extremities: No LE Edema  Neuro: A&Ox3, no focal defecits  Skin: no rash noted    Labs and Radiology - prior reviewed, none new    6/10/20 CTA  FINDINGS: Diffuse esophageal wall thickening, especially proximally  and  distally. Small hiatal hernia. If there is history of dysphagia  comment please correlate with esophagram and/or upper GI endoscopy as  clinically appropriate. Left atrial cardiac chamber is enlarged. Upper  abdomen was grossly unremarkable. Thyroid appears grossly  unremarkable. Subcentimeter right hilar lymph node appears unchanged.  No enlarged axillary or mediastinal lymph nodes. Atherosclerotic  calcification in aorta and coronary arteries. No dominant pulmonary  nodule or groundglass opacity. Areas of scarring and subsegmental  atelectasis are present in both lung bases, slightly increased from  previous. Bones show degenerative disc changes throughout the thoracic  spine. Excessive kyphosis in the thorax.  The pulmonary artery tree was well-opacified with contrast comment no  evidence of film defect indicate embolus.  IMPRESSION: No CT evidence for pulmonary embolus. Esophageal  thickening comment please consider nonemergent esophagram or upper GI  endoscopy as clinically appropriate. Small hiatal hernia. Aortic and  coronary artery atherosclerosis. Bibasilar atelectasis and scarring  bilaterally. Excessive thoracic kyphosis.     June 2020 Echo  Interpretation Summary  Global and regional left ventricular function is normal with an EF of 60-65%.  No regional wall motion abnormalities are seen.  Right ventricular function, chamber size, wall motion, and thickness are  normal.  No hemodynamically significant valvular abnoramalities were noted.  The inferior vena cava was normal in size with preserved respiratory  variability.  No pericardial effusion is present.  This study was compared with the study from 11/13/2019 . There has been no  change.    PFT's:  FVC: 2.94L (102%)  FEV1:2.46L (109%)  FEV1/FVC: 84%  DLCO: 13.67 (63%)    MIP & MEP were hindered by effort and are not reliable.    ------------------------------------------    Assessment and Plan:  69 yo W w/ Hx of CAD s/p MI, CHF (EF 60-65%), PAD s/p  bilateral AKAs, CVA x3, JOSE (off CPAP, awaiting repeat study study), GERD with consequent esophageal stricture, hiatal hernia, epilepsy, sickle cell trait, androgen insensitivy syndrome, chronic pain with fentanyl pump, blindness - presents for follow up of chronic bronchitis, chronic dyspnea and chronic cough.     Her cough and bronchitis are related to her ongoing smoking and persistent reflux symptoms. She has esophageal stricture and recent imaging showed inflammation throughout the esophagus suggesting uncontrolled GERD. Will increase her pantoprazole to  40 BID from daily. Discussed smoking cessation.     Her ongoing dyspnea does not appear primarily pulmonary. Her PFTs do not show obstruction although we will not change her LAMA & ICS/LABA for now. Her DLCO is not markedly reduced. Review of prior CTA does not significant emphysema or other parenchymal disease. Additionally, she has no anemia. Also, recent Echo's showed recovered EF from a prior cardiomyopathy and RV function was normal. DDx at this point includes ongoing esophagitis causing shortness of breath, deconditioning, and uncontrolled JOSE. Again increasing her PPI. She is following with sleep and will get a PSG as soon as possible to re-qualify for CPAP.    RTC 3 months.    Seen and staffed with Dr. Perlman.  Rl Barroso MD  PCC Fellow, Pgr [phone]    Attending Note:    The patient was seen examined by me with the pulmonary fellow, I have personally reviewed the imaging studies, lab and culture results.  The note reflects our joint findings, assessment and plan.      David Perlman, M.D.  Pulmonary, Allergy and Critical Care.

## 2020-09-04 NOTE — LETTER
9/4/2020     RE: Sonya Foote  [address]  [place] [address]    Dear Colleague,    Thank you for referring your patient, Sonya Foote, to the [facility]. Please see a copy of my visit note below.    Pulmonary Clinic Follow-Up Visit Note    CC: Chronic Bronchitis Follow Up    HPI: 69 yo W w/ Hx of CAD s/p MI, CHF (EF 60-65%), PAD s/p bilateral AKAs, CVA x3, JOSE (off CPAP, awaiting repeat study study), GERD with consequent esophageal stricture, hiatal hernia, epilepsy, sickle cell trait, androgen insensitivy syndrome, chronic pain with fentanyl pump, blindness - presents for follow up of chronic bronchitis and chronic cough. Last seen by Dr. Toy Sandhu 6/24/20. At that time was complaining of chest hurting with movement and was having an ongoing foamy white cough. She was on advair, incurse and albuterol nebs. She had restarted smoking. She was noted to not have obstruction on most recent 2015 PFTs. The plan was to repeat PFTs, pursue cardiology follow up for CHF as it was though that she did not have COPD per PFTs and her cough could be CHF-related. She did see palliative care. She also saw sleep who noted she is off her auto-titrating PAP due to non adherence is awaiting the sleep lab to open again for a PSG with night titration.     Today she reports, no improvement in symptoms. She continues to smoke. She has persistent white sputum and cough. She has dyspnea when trying to ambulate with her prosthesis. No wheezing. No edema. She does have persistent reflux.     PMH:  Past Medical History:   Diagnosis Date     Acid reflux disease      Amputation above knee (H)     bilateral     Benign essential hypertension      Blind left eye     due to central retinal artery occlusion     CAD (coronary artery disease)     UA and inferior MI in 2016 s/p PCI     Chronic back pain      Chronic neck pain      Chronic pain syndrome     with fentanyl intrathecal pain pump     Complex  sleep apnea syndrome      COPD (chronic obstructive pulmonary disease) (H)      Dysphagia     chronic due to esophageal strictures and multiple previous dilitations      ROGER (generalized anxiety disorder)      History of blood transfusion     x5; no adverse reactions     History of central retinal artery occlusion 2006    left-sided     History of stroke     residual left-sided weakness     Hx of carotid artery stenosis     s/p left carotid stent in 2006 and balloon angioplasty in 2016     MDD (major depressive disorder)      Neurogenic bladder     SPT in place     JOSE (obstructive sleep apnea)      Osteoporosis      PAD (peripheral artery disease) (H)      Peripheral neuropathy      Person who has had sex change operation     male to female     Seizure disorder (H)     many years since last grand mal; daily, brief petit mals     Sickle cell trait (H)      Type 2 diabetes mellitus (H)      PSH:  Past Surgical History:   Procedure Laterality Date     AMPUTATE LEG ABOVE KNEE Left 6/11/2016    Procedure: AMPUTATE LEG ABOVE KNEE;  Surgeon: Mello Rodriguez MD;  [facility]     AMPUTATE LEG BELOW KNEE Right 11/7/2016    Procedure: AMPUTATE LEG BELOW KNEE;  Surgeon: Savannah Durant MD;  [facility]     AMPUTATE REVISION STUMP LOWER EXTREMITY Right 11/11/2016    Procedure: AMPUTATE REVISION STUMP LOWER EXTREMITY;  Surgeon: Savannah Durant MD;  [facility]     AMPUTATE REVISION STUMP LOWER EXTREMITY Right 11/16/2016    Procedure: AMPUTATE REVISION STUMP LOWER EXTREMITY;  Surgeon: Savannah Durant MD;  [facility]     AMPUTATE TOE(S) Right 1/5/2016    Procedure: AMPUTATE TOE(S);  Surgeon: Mello Gaines DPM;  Location: [facility]     ANGIOGRAM Bilateral 11/21/2014    Procedure: ANGIOGRAM;  Surgeon: Savannah Durant MD;  [facility]     ANGIOGRAM Left 1/16/2015    Procedure: ANGIOGRAM;  Surgeon: Savannah Durant MD;  [facility]     ANGIOGRAM Bilateral 9/14/2015    Procedure: ANGIOGRAM;  Surgeon: Savannah Durant  MD;  [facility]     ANGIOGRAM Left 10/12/2015    Procedure: ANGIOGRAM;  Surgeon: Savannah Durant MD;  [facility]     ANGIOGRAM Right 6/6/2016    Procedure: ANGIOGRAM;  Surgeon: Savannah Durant MD;  [facility]     ANGIOPLASTY Right 6/6/2016    Procedure: ANGIOPLASTY;  Surgeon: Savannah Durant MD;  [facility]     APPENDECTOMY       BREAST BIOPSY, RT/LT Right     benign     CATARACT IOL, RT/LT Right      CHOLECYSTECTOMY       COLONOSCOPY N/A 8/25/2014    Procedure: COLONOSCOPY;  Surgeon: Mello Ferrer MD;  [facility]     COLONOSCOPY WITH CO2 INSUFFLATION N/A 8/20/2014    Procedure: COLONOSCOPY WITH CO2 INSUFFLATION;  Surgeon: Duane, William Charles, MD;  Location: [facility]     CYSTOSCOPY, INJECT BOTOX N/A 5/21/2020    Procedure: CYSTOSCOPY, WITH BOTULINUM TOXIN INJECTION;  Surgeon: Loki Gordon MD;  [facility]     CYSTOSCOPY, INTRAVESICAL INJECTION N/A 10/31/2019    Procedure: CYSTOSCOPY, BOTOX INJECTION, Suprapubic Catheter Exchange;  Surgeon: Loki Gordon MD;  [facility]     CYSTOSTOMY, INSERT TUBE SUPRAPUBIC, COMBINED N/A 1/16/2018    Procedure: COMBINED CYSTOSTOMY, INSERT TUBE SUPRAPUBIC;  Cystoscopy, Intraoperative Ultrasound, Suprapubic Tube Placement;  Surgeon: Keanu Dawson MD;  [facility]     ENDARTERECTOMY FEMORAL  5/23/2014    Procedure: ENDARTERECTOMY FEMORAL;  Surgeon: Jason Joshi MD;  [facility]     ESOPHAGOSCOPY, GASTROSCOPY, DUODENOSCOPY (EGD), COMBINED  12/14/2012    Procedure: COMBINED ESOPHAGOSCOPY, GASTROSCOPY, DUODENOSCOPY (EGD), BIOPSY SINGLE OR MULTIPLE;  ESOPHAGOSCOPY, GASTROSCOPY, DUODENOSCOPY (EGD), DILATATION ;  Surgeon: Elizabeth Stevenson MD;  Location:  GI     ESOPHAGOSCOPY, GASTROSCOPY, DUODENOSCOPY (EGD), COMBINED  12/31/2013    Procedure: COMBINED ESOPHAGOSCOPY, GASTROSCOPY, DUODENOSCOPY (EGD), BIOPSY SINGLE OR MULTIPLE;;  Surgeon: Clemente Lopez MD;  [facility]     ESOPHAGOSCOPY, GASTROSCOPY,  DUODENOSCOPY (EGD), COMBINED  4/1/2014    Procedure: COMBINED ESOPHAGOSCOPY, GASTROSCOPY, DUODENOSCOPY (EGD);;  Surgeon: Clemente Lopez MD;  [facility]     ESOPHAGOSCOPY, GASTROSCOPY, DUODENOSCOPY (EGD), COMBINED  6/28/2014    Procedure: COMBINED ESOPHAGOSCOPY, GASTROSCOPY, DUODENOSCOPY (EGD);  Surgeon: Clemente Lopez MD;  [facility]     ESOPHAGOSCOPY, GASTROSCOPY, DUODENOSCOPY (EGD), COMBINED N/A 8/20/2014    Procedure: COMBINED ESOPHAGOSCOPY, GASTROSCOPY, DUODENOSCOPY (EGD), BIOPSY SINGLE OR MULTIPLE;  Surgeon: Duane, William Charles, MD;  Location:  OR     ESOPHAGOSCOPY, GASTROSCOPY, DUODENOSCOPY (EGD), COMBINED N/A 8/22/2014    Procedure: COMBINED ESOPHAGOSCOPY, GASTROSCOPY, DUODENOSCOPY (EGD), BIOPSY SINGLE OR MULTIPLE;  Surgeon: Mello Ferrer MD;  [facility]     ESOPHAGOSCOPY, GASTROSCOPY, DUODENOSCOPY (EGD), COMBINED N/A 10/2/2014    Procedure: COMBINED ESOPHAGOSCOPY, GASTROSCOPY, DUODENOSCOPY (EGD), BIOPSY SINGLE OR MULTIPLE;  Surgeon: Remy Haskins MD;  [facility]     ESOPHAGOSCOPY, GASTROSCOPY, DUODENOSCOPY (EGD), COMBINED Left 12/15/2014    Procedure: COMBINED ESOPHAGOSCOPY, GASTROSCOPY, DUODENOSCOPY (EGD), BIOPSY SINGLE OR MULTIPLE;  Surgeon: Remy Haskins MD;  [facility]     ESOPHAGOSCOPY, GASTROSCOPY, DUODENOSCOPY (EGD), COMBINED N/A 2/25/2015    Procedure: COMBINED ENDOSCOPIC ULTRASOUND, ESOPHAGOSCOPY, GASTROSCOPY, DUODENOSCOPY (EGD), FINE NEEDLE ASPIRATE/BIOPSY;  Surgeon: Clemente Lugo MD;  [facility]     ESOPHAGOSCOPY, GASTROSCOPY, DUODENOSCOPY (EGD), COMBINED Left 2/25/2015    Procedure: COMBINED ESOPHAGOSCOPY, GASTROSCOPY, DUODENOSCOPY (EGD), BIOPSY SINGLE OR MULTIPLE;  Surgeon: Clemente Lugo MD;  [facility]     ESOPHAGOSCOPY, GASTROSCOPY, DUODENOSCOPY (EGD), COMBINED N/A 9/25/2016    Procedure: COMBINED ESOPHAGOSCOPY, GASTROSCOPY, DUODENOSCOPY (EGD);  Surgeon: Aziza Patiño MD;  Location:  GI     ESOPHAGOSCOPY,  GASTROSCOPY, DUODENOSCOPY (EGD), COMBINED N/A 1/18/2017    Procedure: COMBINED ESOPHAGOSCOPY, GASTROSCOPY, DUODENOSCOPY (EGD), BIOPSY SINGLE OR MULTIPLE;  Surgeon: Clemente Lopez MD;  [facility]     ESOPHAGOSCOPY, GASTROSCOPY, DUODENOSCOPY (EGD), COMBINED N/A 11/26/2017    Procedure: COMBINED ESOPHAGOSCOPY, GASTROSCOPY, DUODENOSCOPY (EGD), REMOVE FOREIGN BODY;  Esophagogastroduodenoscopy with foreign body extraction  ;  Surgeon: Herberth Castrejon MD;  [facility]     ESOPHAGOSCOPY, GASTROSCOPY, DUODENOSCOPY (EGD), COMBINED N/A 11/26/2017    Procedure: COMBINED ESOPHAGOSCOPY, GASTROSCOPY, DUODENOSCOPY (EGD), REMOVE FOREIGN BODY;;  Surgeon: Herberth Castrejon MD;  [facility]     ESOPHAGOSCOPY, GASTROSCOPY, DUODENOSCOPY (EGD), COMBINED N/A 4/10/2020    Procedure: ESOPHAGOGASTRODUODENOSCOPY (EGD);  Surgeon: Yael Cabral MD;  [facility]     FASCIOTOMY LOWER EXTREMITY Left 6/10/2016    Procedure: FASCIOTOMY LOWER EXTREMITY;  Surgeon: Mello Rodriguez MD;  [facility]     HC CAPSULE ENDOSCOPY N/A 8/25/2014    Procedure: CAPSULE/PILL CAM ENDOSCOPY;  Surgeon: Remy Haskins MD;  [facility]     HC CAPSULE ENDOSCOPY N/A 10/2/2014    Procedure: CAPSULE/PILL CAM ENDOSCOPY;  Surgeon: Remy Haskins MD;  [facility]     ORTHOPEDIC SURGERY      broken wrist repair     REVISE CATHETER INTRATHECAL  08/24/2020     SEX TRANSFORMATION SURGERY, MALE TO FEMALE  1974 1974     SINUS SURGERY      cyst removed     TONSILLECTOMY       VASCULAR SURGERY  2006    Left carotid stent     Allergies:  Allergies   Allergen Reactions     Bee Venom Anaphylaxis     Penicillins Anaphylaxis     Dilantin [Phenytoin] Other (See Comments)     Generic dilantin only per pt     Iodine Hives     Novocaine [Procaine] Hives     Tositumomab Unknown     Social History:  Social History     Socioeconomic History     Marital status:      Spouse name: Not on file     Number of children: 2     Years of  education: Not on file     Highest education level: Not on file   Occupational History     Occupation: Retired   Social Needs     Financial resource strain: Not on file     Food insecurity     Worry: Not on file     Inability: Not on file     Transportation needs     Medical: Not on file     Non-medical: Not on file   Tobacco Use     Smoking status: Current Every Day Smoker     Packs/day: 1.50     Years: 30.00     Pack years: 45.00     Types: Cigarettes, Cigars     Smokeless tobacco: Never Used     Tobacco comment: 1.5 packs per day    Substance and Sexual Activity     Alcohol use: No     Alcohol/week: 0.0 standard drinks     Drug use: No     Sexual activity: Not Currently   Lifestyle     Physical activity     Days per week: Not on file     Minutes per session: Not on file     Stress: Not on file   Relationships     Social connections     Talks on phone: Not on file     Gets together: Not on file     Attends [employer] service: Not on file     Active member of club or organization: Not on file     Attends meetings of clubs or organizations: Not on file     Relationship status: Not on file     Intimate partner violence     Fear of current or ex partner: Not on file     Emotionally abused: Not on file     Physically abused: Not on file     Forced sexual activity: Not on file   Other Topics Concern     Parent/sibling w/ CABG, MI or angioplasty before 65F 55M? Not Asked      Service Not Asked     Blood Transfusions Not Asked     Caffeine Concern No     Comment: 1 in the morning     Occupational Exposure Not Asked     Hobby Hazards Not Asked     Sleep Concern Not Asked     Stress Concern Not Asked     Weight Concern Not Asked     Special Diet Not Asked     Back Care Not Asked     Exercise Not Asked     Bike Helmet Not Asked     Seat Belt Not Asked     Self-Exams Not Asked   Social History Narrative    Living in a nursing home (as of 2020) for prosthetic fitting and therapy; usually resides in an [facility].    Two  adopted children (Kam and Prashanth) and 3 grandchildren (as of 2020).     Medications:  Current Outpatient Medications   Medication Sig Dispense Refill     ADVAIR DISKUS 250-50 MCG/DOSE inhaler INHALE 1 PUFF BY MOUTH TWICE DAILY 1 Inhaler 5     albuterol (PROVENTIL) (2.5 MG/3ML) 0.083% neb solution INHALE 1 VIAL VIA NEBULIZER EVERY 6 HOURS AS NEEDED 360 mL 11     alendronate (FOSAMAX) 70 MG tablet TAKE ONE TABLET BY MOUTH EVERY 7 DAYS (TAKE WITH 8OZ OF WATER 30 MINUTES BEFORE BREAKFAST AND REMAIN UPRIGHT DURING THIS TIME) 4 tablet 97     ASPERCREME LIDOCAINE 4 % Patch APPLY 1 PATCH TOPICALLY TO LOWER BACK ONCE DAILY (ON FOR 12 HOURS, OFF FOR 12 HOURS) 30 patch 10     ASPIRIN LOW DOSE 81 MG chewable tablet CHEW AND SWALLOW ONE TABLET BY MOUTH IN THE MORNING 28 tablet 10     atorvastatin (LIPITOR) 40 MG tablet TAKE 1 TABLET BY MOUTH AT BEDTIME 28 tablet 10     blood glucose monitoring (ONE TOUCH ULTRA 2) meter device kit Use to test blood sugars 3 times daily or as directed. 1 kit 0     blood glucose monitoring (ONE TOUCH ULTRA) test strip Use to test blood sugars 3 times daily or as directed. 3 Box 3     blood glucose monitoring (ONE TOUCH ULTRASOFT) lancets Use to test blood sugar 3 times daily or as directed. 100 each PRN     brimonidine (ALPHAGAN) 0.2 % ophthalmic solution Place 1 drop into the right eye 2 times daily ( 12 hours apart).    Please keep 11-16-20 appt for refills. 5 mL 4     capsaicin-menthol-methyl sal 0.025-1-12 % external cream Apply 1 Application topically daily as needed (topical pain) 56.6 g 1     diclofenac (VOLTAREN) 1 % topical gel APPLY 2GM TOPICALLY TO AFFECTED AREA(S) FOUR TIMES A  g 3     dorzolamide (TRUSOPT) 2 % ophthalmic solution Place 1 drop into the right eye 2 times daily 1 Bottle 1     escitalopram (LEXAPRO) 10 MG tablet TAKE 1 TABLET BY MOUTH ONCE DAILY 28 tablet 10     FEROSUL 325 (65 Fe) MG tablet TAKE 1 TABLET BY MOUTH TWICE DAILY WITH MEALS 56 tablet 10     fluticasone  (FLONASE) 50 MCG/ACT nasal spray Spray 2 sprays into both nostrils daily 15.8 mL 1     INCRUSE ELLIPTA 62.5 MCG/INH Inhaler INHALE 1 PUFF BY MOUTH ONCE DAILY 30 each 10     lactulose (CHRONULAC) 10 GM/15ML solution Take 30 mLs (20 g) by mouth as needed for constipation 236 mL 0     latanoprost (XALATAN) 0.005 % ophthalmic solution Place 1 drop into both eyes At Bedtime 2.5 mL 11     levETIRAcetam (KEPPRA) 500 MG tablet TAKE 1 TABLET BY MOUTH TWICE DAILY 56 tablet 11     lisinopril (ZESTRIL) 20 MG tablet TAKE 1 TABLET BY MOUTH EVERY MORNING 28 tablet 11     loratadine (CLARITIN) 10 MG tablet TAKE 1 TABLET BY MOUTH ONCE DAILY 28 tablet 11     Menthol, Topical Analgesic, 5 % GEL Externally apply topically daily as needed 113 g 0     metFORMIN (GLUCOPHAGE) 500 MG tablet TAKE 1 TABLET BY MOUTH EVERY EVENING WITH DINNER 28 tablet 11     metoprolol succinate ER (TOPROL-XL) 50 MG 24 hr tablet TAKE 1 TABLET BY MOUTH EVERY MORNING 30 tablet 10     Multiple Vitamins-Minerals (TAB-A-MACY) TABS TAKE 1 TABLET BY MOUTH ONCE DAILY 28 tablet 11     multivitamin, therapeutic (THERA-VIT) TABS tablet Take 1 tablet by mouth daily       nicotine (COMMIT) 2 MG lozenge Place 1 lozenge (2 mg) inside cheek every hour as needed for smoking cessation 60 lozenge 11     nicotine (NICODERM CQ) 14 MG/24HR 24 hr patch Place 1 patch onto the skin every 24 hours 30 patch 11     nitroGLYcerin (NITROSTAT) 0.4 MG sublingual tablet DISSOLVE ONE TABLET UNDER TONGUE AS NEEDED FOR CHEST PAIN MAY REPEAT EVERY 5 MINUTES FOR 3 DOSES, IF SYMPTOMS PERSIST CALL 911 25 tablet 3     Nutritional Supplements (ENSURE NUTRITION SHAKE) LIQD Take 1 Bottle by mouth 2 times daily With meals 60 Bottle 0     pantoprazole (PROTONIX) 40 MG EC tablet TAKE 1 TABLET BY MOUTH EVERY MORNING 28 tablet 11     phenytoin (DILANTIN) 100 MG capsule TAKE 2 CAPS (200MG) BY MOUTH TWICE A  capsule 11     phenytoin sodium extended (DILANTIN) 200 MG capsule TAKE 1 CAPSULE BY MOUTH TWICE  DAILY 56 capsule 11     polyethylene glycol (MIRALAX) 17 GM/SCOOP powder MIX 17GM OF POWDER IN 8OZ OF WATER UNTIL COMPLETELY DISSOLVED. DRINK SOLUTION BY MOUTH IN THE MORNING 510 g 8     pregabalin (LYRICA) 150 MG capsule TAKE 1 CAPSULE BY MOUTH THREE TIMES DAILY 90 capsule 4     risperiDONE (RISPERDAL) 0.5 MG tablet TAKE 1 TABLET BY MOUTH AT BEDTIME 28 tablet 4     SENEXON-S 8.6-50 MG tablet TAKE 1 TABLET BY MOUTH TWICE DAILY 56 tablet 10     sennosides (SENOKOT) 8.6 MG tablet Take 1 tablet by mouth 2 times daily        solifenacin (VESICARE) 10 MG tablet TAKE 1 TABLET BY MOUTH EVERY MORNING 28 tablet 10     traZODone (DESYREL) 150 MG tablet TAKE 1 TABLET BY MOUTH AT BEDTIME 28 tablet 10     vitamin D3 (CHOLECALCIFEROL) 10 MCG (400 UNIT) capsule Take 2 capsules by mouth daily       Vitamin D3 (VITAMIN D) 10 MCG (400 UNIT) tablet TAKE TWO TABLETS (800 UNITS) BY MOUTH ONCE DAILY 56 tablet 11     Family History:  Family History   Problem Relation Age of Onset     Dementia Mother      Diabetes Mother         type unknown     Coronary Artery Disease Mother         MI     Glaucoma Father      Diabetes Father         type unknown     Heart Failure Father      Schizophrenia Brother      Depression Brother      Suicide Sister      Depression Sister      Diabetes Sister      Ovarian Cancer Maternal Aunt      Leukemia Maternal Aunt      Diabetes Maternal Grandmother      Diabetes Maternal Grandfather      Diabetes Paternal Grandmother      Diabetes Paternal Grandfather      Breast Cancer Sister      Cerebrovascular Disease Brother      Colon Cancer No family hx of      Macular Degeneration No family hx of        Physical Exam:  /66   Pulse 72   Temp 98.2  F (36.8  C) (Oral)   Resp 18   Wt 56.7 kg (125 lb)   SpO2 92%   BMI 47.53 kg/m      General: Sitting in the chair in NAD  HEENT: anicteric, moist mucosa  Neck: no palpable lymphadenopathy, no JVD noted  Chest: CTAB, no wheezing  Cardiac: RRR no murmurs  Abdomen:  Soft, flat, non tender, active BS  Extremities: No LE Edema  Neuro: A&Ox3, no focal defecits  Skin: no rash noted    Labs and Radiology - prior reviewed, none new    6/10/20 CTA  FINDINGS: Diffuse esophageal wall thickening, especially proximally  and distally. Small hiatal hernia. If there is history of dysphagia  comment please correlate with esophagram and/or upper GI endoscopy as  clinically appropriate. Left atrial cardiac chamber is enlarged. Upper  abdomen was grossly unremarkable. Thyroid appears grossly  unremarkable. Subcentimeter right hilar lymph node appears unchanged.  No enlarged axillary or mediastinal lymph nodes. Atherosclerotic  calcification in aorta and coronary arteries. No dominant pulmonary  nodule or groundglass opacity. Areas of scarring and subsegmental  atelectasis are present in both lung bases, slightly increased from  previous. Bones show degenerative disc changes throughout the thoracic  spine. Excessive kyphosis in the thorax.  The pulmonary artery tree was well-opacified with contrast comment no  evidence of film defect indicate embolus.  IMPRESSION: No CT evidence for pulmonary embolus. Esophageal  thickening comment please consider nonemergent esophagram or upper GI  endoscopy as clinically appropriate. Small hiatal hernia. Aortic and  coronary artery atherosclerosis. Bibasilar atelectasis and scarring  bilaterally. Excessive thoracic kyphosis.     June 2020 Echo  Interpretation Summary  Global and regional left ventricular function is normal with an EF of 60-65%.  No regional wall motion abnormalities are seen.  Right ventricular function, chamber size, wall motion, and thickness are  normal.  No hemodynamically significant valvular abnoramalities were noted.  The inferior vena cava was normal in size with preserved respiratory  variability.  No pericardial effusion is present.  This study was compared with the study from 11/13/2019 . There has been no  change.    PFT's:  FVC:  2.94L (102%)  FEV1:2.46L (109%)  FEV1/FVC: 84%  DLCO: 13.67 (63%)    MIP & MEP were hindered by effort and are not reliable.    ------------------------------------------    Assessment and Plan:  71 yo W w/ Hx of CAD s/p MI, CHF (EF 60-65%), PAD s/p bilateral AKAs, CVA x3, JOSE (off CPAP, awaiting repeat study study), GERD with consequent esophageal stricture, hiatal hernia, epilepsy, sickle cell trait, androgen insensitivy syndrome, chronic pain with fentanyl pump, blindness - presents for follow up of chronic bronchitis, chronic dyspnea and chronic cough.     Her cough and bronchitis are related to her ongoing smoking and persistent reflux symptoms. She has esophageal stricture and recent imaging showed inflammation throughout the esophagus suggesting uncontrolled GERD. Will increase her pantoprazole to  40 BID from daily. Discussed smoking cessation.     Her ongoing dyspnea does not appear primarily pulmonary. Her PFTs do not show obstruction although we will not change her LAMA & ICS/LABA for now. Her DLCO is not markedly reduced. Review of prior CTA does not significant emphysema or other parenchymal disease. Additionally, she has no anemia. Also, recent Echo's showed recovered EF from a prior cardiomyopathy and RV function was normal. DDx at this point includes ongoing esophagitis causing shortness of breath, deconditioning, and uncontrolled JOSE. Again increasing her PPI. She is following with sleep and will get a PSG as soon as possible to re-qualify for CPAP.    RTC 3 months.    Seen and staffed with Dr. Perlman.  Rl Barroso MD  PCC Fellow, Pgr [phone]    Attending Note:    The patient was seen examined by me with the pulmonary fellow, I have personally reviewed the imaging studies, lab and culture results.  The note reflects our joint findings, assessment and plan.      David Perlman, M.D.  Pulmonary, Allergy and Critical Care.

## 2020-09-04 NOTE — NURSING NOTE
Chief Complaint   Patient presents with     RECHECK     2 month follow up      Medications reviewed and updated.  Vitals taken  Silvina Lim CMA

## 2020-09-04 NOTE — PATIENT INSTRUCTIONS
Get your sleep study as soon as possible. Like the sleep doctor said, call the sleep group every 6 weeks to see when the sleep studies open back up after COVID.     Increase your heart burn medicine (pantoprazole) to 40 mg TWICE a day.    Universal Safety Interventions

## 2020-09-10 NOTE — NURSING NOTE
Patient's information has been sent to Insomnia Team to call pt to schedule CBTI appointment.  Pt will be called for titration study when clinic is able to perform again.    AUBREY Woodall

## 2020-09-11 ENCOUNTER — TELEPHONE (OUTPATIENT)
Dept: UROLOGY | Facility: CLINIC | Age: 70
End: 2020-09-11

## 2020-09-11 NOTE — TELEPHONE ENCOUNTER
Left a detailed VM that appointment on 10/5 in person is moved to a phone call on 10/12. Left the clinic number and will mail this change out

## 2020-09-14 ENCOUNTER — TELEPHONE (OUTPATIENT)
Dept: INTERNAL MEDICINE | Facility: CLINIC | Age: 70
End: 2020-09-14

## 2020-09-14 DIAGNOSIS — R35.0 URINARY FREQUENCY: ICD-10-CM

## 2020-09-14 DIAGNOSIS — S78.119A AMPUTATION ABOVE KNEE (H): Primary | ICD-10-CM

## 2020-09-14 DIAGNOSIS — J44.9 CHRONIC OBSTRUCTIVE PULMONARY DISEASE, UNSPECIFIED COPD TYPE (H): ICD-10-CM

## 2020-09-14 RX ORDER — ALBUTEROL SULFATE 0.83 MG/ML
2.5 SOLUTION RESPIRATORY (INHALATION) EVERY 6 HOURS PRN
Qty: 180 ML | Refills: 3 | Status: SHIPPED | OUTPATIENT
Start: 2020-09-14 | End: 2021-07-27

## 2020-09-14 NOTE — TELEPHONE ENCOUNTER
"Requested Prescriptions   Pending Prescriptions Disp Refills     albuterol (PROVENTIL) (2.5 MG/3ML) 0.083% neb solution [Pharmacy Med Name: Albuterol Sulfate (2.5 MG/3ML) 0.083% Nebulization solution] 180 mL 97     Sig: NEBULIZE THE CONTENT OF 1 VIAL EVERY 8 HOURS AS NEEDED       Asthma Maintenance Inhalers - Anticholinergics Passed - 9/14/2020  2:22 PM        Passed - Patient is age 12 years or older        Passed - Recent (12 mo) or future (30 days) visit within the authorizing provider's specialty     Patient has had an office visit with the authorizing provider or a provider within the authorizing providers department within the previous 12 mos or has a future within next 30 days. See \"Patient Info\" tab in inbasket, or \"Choose Columns\" in Meds & Orders section of the refill encounter.              Passed - Medication is active on med list       Short-Acting Beta Agonist Inhalers Protocol  Passed - 9/14/2020  2:22 PM        Passed - Patient is age 12 or older        Passed - Recent (12 mo) or future (30 days) visit within the authorizing provider's specialty     Patient has had an office visit with the authorizing provider or a provider within the authorizing providers department within the previous 12 mos or has a future within next 30 days. See \"Patient Info\" tab in inbasket, or \"Choose Columns\" in Meds & Orders section of the refill encounter.              Passed - Medication is active on med list             "
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
## 2020-09-14 NOTE — TELEPHONE ENCOUNTER
Left pt message to call clinic back. Estela Tapia, RN  (need to get size, brand, frequency, amount, and where to send RX).

## 2020-09-14 NOTE — TELEPHONE ENCOUNTER
Reason for Call:  Other     Detailed comments: needs a order for pull ups.      Phone Number Patient can be reached at: Home number on file [phone] (home)     Best Time: today    Can we leave a detailed message on this number? YES    Call taken on 9/14/2020 at 1:35 PM by ERIN MASCORRO

## 2020-09-15 NOTE — TELEPHONE ENCOUNTER
Pt called. She states her bladder spasms have gotten really bad. Message routed to Dr. Ruiz to place orders.

## 2020-09-15 NOTE — TELEPHONE ENCOUNTER
The pt called back. She doesn't want to have a phone visit-she wants to be seen. And she needs to come in sooner - she is in a lot of pain that is not going away. She mentioned getting botox again. Please call the pt to discuss her appt. Thanks.

## 2020-09-15 NOTE — TELEPHONE ENCOUNTER
Faxed dme for Hospital Bed to [facility] at [phone].  Faxed dme for Incontinence supplies to [facility] at [phone].

## 2020-09-18 ENCOUNTER — TELEPHONE (OUTPATIENT)
Dept: INTERNAL MEDICINE | Facility: CLINIC | Age: 70
End: 2020-09-18

## 2020-09-18 DIAGNOSIS — S78.119A AMPUTATION ABOVE KNEE (H): Primary | ICD-10-CM

## 2020-09-18 DIAGNOSIS — Z11.59 ENCOUNTER FOR SCREENING FOR OTHER VIRAL DISEASES: Primary | ICD-10-CM
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
## 2020-09-21 ENCOUNTER — MEDICAL CORRESPONDENCE (OUTPATIENT)
Dept: HEALTH INFORMATION MANAGEMENT | Facility: CLINIC | Age: 70
End: 2020-09-21

## 2020-09-21 ENCOUNTER — TELEPHONE (OUTPATIENT)
Dept: INTERNAL MEDICINE | Facility: CLINIC | Age: 70
End: 2020-09-21

## 2020-09-21 NOTE — TELEPHONE ENCOUNTER
Maria Eugenia OT with  HC requesting additional orders:    1x a week for 4 weeks to educate safety with chair and apartment adaptation.     Informed approved per standing orders. HC staff will fax these orders for MD signature.      Valeria CACERESN, RN, PHN

## 2020-09-22 ENCOUNTER — TELEPHONE (OUTPATIENT)
Dept: UROLOGY | Facility: CLINIC | Age: 70
End: 2020-09-22

## 2020-09-22 NOTE — TELEPHONE ENCOUNTER
Patient is scheduled for surgery with Dr. Evan Monzon with [facility]    Date of Surgery: 10/08/20    Location: [place] OR    Informed patient they will need an adult  yes    H&P: Scheduled with PCP Horacio ALFORD    Additional imaging/appointments: n/a    Surgery packet: to be mailed by 9/23/20     Additional comments: n/a

## 2020-10-01 ENCOUNTER — OFFICE VISIT (OUTPATIENT)
Dept: INTERNAL MEDICINE | Facility: CLINIC | Age: 70
End: 2020-10-01
Payer: COMMERCIAL

## 2020-10-01 VITALS
WEIGHT: 125 LBS | SYSTOLIC BLOOD PRESSURE: 108 MMHG | OXYGEN SATURATION: 98 % | RESPIRATION RATE: 16 BRPM | HEART RATE: 73 BPM | BODY MASS INDEX: 47.53 KG/M2 | DIASTOLIC BLOOD PRESSURE: 54 MMHG | TEMPERATURE: 97.7 F

## 2020-10-01 DIAGNOSIS — E11.59 TYPE 2 DIABETES MELLITUS WITH OTHER CIRCULATORY COMPLICATION, WITHOUT LONG-TERM CURRENT USE OF INSULIN (H): ICD-10-CM

## 2020-10-01 DIAGNOSIS — I10 BENIGN ESSENTIAL HYPERTENSION: ICD-10-CM

## 2020-10-01 DIAGNOSIS — G40.909 SEIZURE DISORDER (H): ICD-10-CM

## 2020-10-01 DIAGNOSIS — N31.9 NEUROGENIC BLADDER: ICD-10-CM

## 2020-10-01 DIAGNOSIS — S78.119A AMPUTATION ABOVE KNEE (H): ICD-10-CM

## 2020-10-01 DIAGNOSIS — Z12.31 VISIT FOR SCREENING MAMMOGRAM: ICD-10-CM

## 2020-10-01 DIAGNOSIS — Z01.818 PREOP GENERAL PHYSICAL EXAM: Primary | ICD-10-CM

## 2020-10-01 DIAGNOSIS — G89.4 CHRONIC PAIN SYNDROME: Chronic | ICD-10-CM

## 2020-10-01 DIAGNOSIS — J44.9 CHRONIC OBSTRUCTIVE PULMONARY DISEASE, UNSPECIFIED COPD TYPE (H): ICD-10-CM

## 2020-10-01 LAB
CREAT UR-MCNC: 50 MG/DL
GLUCOSE SERPL-MCNC: 84 MG/DL (ref 70–99)
HGB BLD-MCNC: 12.2 G/DL (ref 11.7–15.7)
MICROALBUMIN UR-MCNC: 8 MG/L
MICROALBUMIN/CREAT UR: 15.68 MG/G CR (ref 0–25)
PLATELET # BLD AUTO: 240 10E9/L (ref 150–450)
POTASSIUM SERPL-SCNC: 4.2 MMOL/L (ref 3.4–5.3)

## 2020-10-01 PROCEDURE — 85018 HEMOGLOBIN: CPT | Performed by: INTERNAL MEDICINE

## 2020-10-01 PROCEDURE — 99215 OFFICE O/P EST HI 40 MIN: CPT | Performed by: INTERNAL MEDICINE

## 2020-10-01 PROCEDURE — 82947 ASSAY GLUCOSE BLOOD QUANT: CPT | Performed by: INTERNAL MEDICINE

## 2020-10-01 PROCEDURE — 82043 UR ALBUMIN QUANTITATIVE: CPT | Performed by: INTERNAL MEDICINE

## 2020-10-01 PROCEDURE — 85049 AUTOMATED PLATELET COUNT: CPT | Performed by: INTERNAL MEDICINE

## 2020-10-01 PROCEDURE — 87088 URINE BACTERIA CULTURE: CPT | Performed by: INTERNAL MEDICINE

## 2020-10-01 PROCEDURE — 87186 SC STD MICRODIL/AGAR DIL: CPT | Performed by: INTERNAL MEDICINE

## 2020-10-01 PROCEDURE — 36415 COLL VENOUS BLD VENIPUNCTURE: CPT | Performed by: INTERNAL MEDICINE

## 2020-10-01 PROCEDURE — 84132 ASSAY OF SERUM POTASSIUM: CPT | Performed by: INTERNAL MEDICINE

## 2020-10-01 PROCEDURE — 87086 URINE CULTURE/COLONY COUNT: CPT | Performed by: INTERNAL MEDICINE

## 2020-10-01 RX ORDER — ALBUTEROL SULFATE 90 UG/1
2 AEROSOL, METERED RESPIRATORY (INHALATION) EVERY 6 HOURS
Qty: 1 INHALER | Refills: 5 | Status: SHIPPED | OUTPATIENT
Start: 2020-10-01 | End: 2020-10-21

## 2020-10-01 ASSESSMENT — PATIENT HEALTH QUESTIONNAIRE - PHQ9: SUM OF ALL RESPONSES TO PHQ QUESTIONS 1-9: 6

## 2020-10-01 NOTE — PROGRESS NOTES
[address] [address]  Phone: [phone]  Primary Provider: Arben Casey  Pre-op Performing Provider: ARBEN CASEY    PREOPERATIVE EVALUATION:  Today's date: 10/1/2020    Sonya Foote is a 70 year old female who presents for a preoperative evaluation.    Surgical Information:  Surgery Details 10/1/2020   Surgery/Procedure: Cystoscopy, injection botox into bladder   Surgery Location: [facility]   Surgeon: Dr. Jauregui   Surgery Date: 10/8/20   Time of Surgery: 12:55 pm   Where patient plans to recover: At a nursing home     Fax number for surgical facility: Note does not need to be faxed, will be available electronically in Epic.  Type of Anesthesia Anticipated: Conscious sedation     Subjective     HPI related to upcoming procedure:   Preop Questions 10/1/2020   1. Have you ever had a heart attack or stroke? YES  -    2. Have you ever had surgery on your heart or blood vessels, such as a stent placement, a coronary artery bypass, or surgery on an artery in your head, neck, heart, or legs? YES:    3. Do you have chest pain with activity? YES -    4. Do you have a history of  heart failure? YES -    5. Do you currently have a cold, bronchitis or symptoms of other infection? No   6. Do you have a cough, shortness of breath, or wheezing? YES -    7. Do you or anyone in your family have previous history of blood clots? YES -    8. Do you or does anyone in your family have a serious bleeding problem such as prolonged bleeding following surgeries or cuts? YES -    9. Have you ever had problems with anemia or been told to take iron pills? YES -    10. Have you had any abnormal blood loss such as black, tarry or bloody stools, or abnormal vaginal bleeding? No   11. Have you ever had a blood transfusion? YES -    11a. Have you ever had a transfusion reaction? No   Are you willing to have a blood transfusion if it is medically needed before, during, or  after your surgery? Yes   13. Have you or any of your relatives ever had problems with anesthesia? No   14. Do you have sleep apnea, excessive snoring or daytime drowsiness? YES -    14a. Do you have a CPAP machine? No   15. Do you have any artifical heart valves or other implanted medical devices like a pacemaker, defibrillator, or continuous glucose monitor? YES -    15a. What type of device do you have? Pain pump   15b. Name of the clinic that manages your device:  [facility]   16. Do you have artificial joints? No   17. Are you allergic to latex? No   18. Is there any chance that you may be pregnant? No     956}  See problem list for active medical problems.  Problems all longstanding and stable, except as noted/documented.  See ROS for pertinent symptoms related to these conditions.      Review of Systems  CONSTITUTIONAL: NEGATIVE for fever, chills, change in weight  EYES: NEGATIVE for vision changes or irritation  ENT/MOUTH: NEGATIVE for ear, mouth and throat problems  RESP: NEGATIVE for significant cough or SOB  CV: NEGATIVE for chest pain, palpitations  GI: NEGATIVE for nausea, abdominal pain, heartburn, or change in bowel habits  : NEGATIVE for frequency, dysuria, or hematuria  NEURO: NEGATIVE for weakness, dizziness or paresthesias  HEME: NEGATIVE for bleeding problems  PSYCHIATRIC: NEGATIVE for changes in mood or affect    Patient Active Problem List    Diagnosis Date Noted     Chronic insomnia 09/01/2020     Priority: Medium     Chest pain on breathing 06/10/2020     Priority: Medium     Neurogenic bladder 05/21/2020     Priority: Medium     Morbid obesity (H) 05/14/2020     Priority: Medium     History of blood transfusion      Priority: Medium     x5; no adverse reactions       COPD (chronic obstructive pulmonary disease) (H)      Priority: Medium     Type 2 diabetes mellitus (H)      Priority: Medium     Benign essential hypertension      Priority: Medium     History of stroke      Priority:  Medium     residual left-sided weakness       Sickle cell trait (H)      Priority: Medium     MDD (major depressive disorder)      Priority: Medium     Seizure disorder (H)      Priority: Medium     many years since last grand mal; daily, brief petit mals       Chronic back pain      Priority: Medium     Chronic neck pain      Priority: Medium     ROGER (generalized anxiety disorder)      Priority: Medium     Person who has had sex change operation      Priority: Medium     male to female       Osteoporosis      Priority: Medium     PAD (peripheral artery disease) (H)      Priority: Medium     Peripheral neuropathy      Priority: Medium     JOSE (obstructive sleep apnea)      Priority: Medium     Amputation above knee (H)      Priority: Medium     bilateral       Blind left eye      Priority: Medium     due to central retinal artery occlusion       Acid reflux disease      Priority: Medium     Hx of carotid artery stenosis      Priority: Medium     s/p left carotid stent in 2006       Complex sleep apnea syndrome      Priority: Medium     Dysphagia      Priority: Medium     chronic due to esophageal strictures and multiple previous dilitations        CAD (coronary artery disease)      Priority: Medium     UA and inferior MI in 2016 s/p PCI       Chronic pain syndrome 03/20/2009     Priority: Medium     Has a fentanyl PUMP    Patient is followed by Allan Casey for ongoing prescription of pain meds  All refills should be approved by this provider, or covering partner.    Maximum quantity per month: 1 month  Clinic visit frequency required: Q 6  months     Controlled substance agreement:  Encounter-Level CSA:     There are no encounter-level csa.        Pain Clinic evaluation in the past: No    DIRE Total Score(s):  No flowsheet data found.    Last [facility] website verification:  6/6/18   [email or web address]       History of central retinal artery occlusion 2006     Priority: Medium     left-sided        Past  Medical History:   Diagnosis Date     Acid reflux disease      Amputation above knee (H)     bilateral     Benign essential hypertension      Blind left eye     due to central retinal artery occlusion     CAD (coronary artery disease)     UA and inferior MI in 2016 s/p PCI     Chronic back pain      Chronic neck pain      Chronic pain syndrome     with fentanyl intrathecal pain pump     Complex sleep apnea syndrome      COPD (chronic obstructive pulmonary disease) (H)      Dysphagia     chronic due to esophageal strictures and multiple previous dilitations      ROGER (generalized anxiety disorder)      History of blood transfusion     x5; no adverse reactions     History of central retinal artery occlusion 2006    left-sided     History of stroke     residual left-sided weakness     Hx of carotid artery stenosis     s/p left carotid stent in 2006 and balloon angioplasty in 2016     MDD (major depressive disorder)      Neurogenic bladder     SPT in place     JOSE (obstructive sleep apnea)      Osteoporosis      PAD (peripheral artery disease) (H)      Peripheral neuropathy      Person who has had sex change operation     male to female     Seizure disorder (H)     many years since last grand mal; daily, brief petit mals     Sickle cell trait (H)      Type 2 diabetes mellitus (H)      Past Surgical History:   Procedure Laterality Date     AMPUTATE LEG ABOVE KNEE Left 6/11/2016    Procedure: AMPUTATE LEG ABOVE KNEE;  Surgeon: Mello Rodriguez MD;  [facility]     AMPUTATE LEG BELOW KNEE Right 11/7/2016    Procedure: AMPUTATE LEG BELOW KNEE;  Surgeon: Savannah Durant MD;  [facility]     AMPUTATE REVISION STUMP LOWER EXTREMITY Right 11/11/2016    Procedure: AMPUTATE REVISION STUMP LOWER EXTREMITY;  Surgeon: Savannah Durant MD;  [facility]     AMPUTATE REVISION STUMP LOWER EXTREMITY Right 11/16/2016    Procedure: AMPUTATE REVISION STUMP LOWER EXTREMITY;  Surgeon: Savannah Durant MD;  [facility]     AMPUTATE  TOE(S) Right 1/5/2016    Procedure: AMPUTATE TOE(S);  Surgeon: Mello Gaines DPM;  Location: [facility]     ANGIOGRAM Bilateral 11/21/2014    Procedure: ANGIOGRAM;  Surgeon: Savannah Durant MD;  [facility]     ANGIOGRAM Left 1/16/2015    Procedure: ANGIOGRAM;  Surgeon: Savannah Durant MD;  [facility]     ANGIOGRAM Bilateral 9/14/2015    Procedure: ANGIOGRAM;  Surgeon: Savannah Durant MD;  [facility]     ANGIOGRAM Left 10/12/2015    Procedure: ANGIOGRAM;  Surgeon: Savannah Durant MD;  [facility]     ANGIOGRAM Right 6/6/2016    Procedure: ANGIOGRAM;  Surgeon: Savannah Durant MD;  [facility]     ANGIOPLASTY Right 6/6/2016    Procedure: ANGIOPLASTY;  Surgeon: Savannah Durant MD;  [facility]     APPENDECTOMY       BREAST BIOPSY, RT/LT Right     benign     CATARACT IOL, RT/LT Right      CHOLECYSTECTOMY       COLONOSCOPY N/A 8/25/2014    Procedure: COLONOSCOPY;  Surgeon: Mello Ferrer MD;  [facility]     COLONOSCOPY WITH CO2 INSUFFLATION N/A 8/20/2014    Procedure: COLONOSCOPY WITH CO2 INSUFFLATION;  Surgeon: Duane, William Charles, MD;  Location: [facility]     CYSTOSCOPY, INJECT BOTOX N/A 5/21/2020    Procedure: CYSTOSCOPY, WITH BOTULINUM TOXIN INJECTION;  Surgeon: Loki Gordon MD;  [facility]     CYSTOSCOPY, INTRAVESICAL INJECTION N/A 10/31/2019    Procedure: CYSTOSCOPY, BOTOX INJECTION, Suprapubic Catheter Exchange;  Surgeon: Loki Gordon MD;  [facility]     CYSTOSTOMY, INSERT TUBE SUPRAPUBIC, COMBINED N/A 1/16/2018    Procedure: COMBINED CYSTOSTOMY, INSERT TUBE SUPRAPUBIC;  Cystoscopy, Intraoperative Ultrasound, Suprapubic Tube Placement;  Surgeon: Keanu Dawson MD;  [facility]     ENDARTERECTOMY FEMORAL  5/23/2014    Procedure: ENDARTERECTOMY FEMORAL;  Surgeon: Jason Joshi MD;  [facility]     ESOPHAGOSCOPY, GASTROSCOPY, DUODENOSCOPY (EGD), COMBINED  12/14/2012    Procedure: COMBINED ESOPHAGOSCOPY, GASTROSCOPY, DUODENOSCOPY (EGD),  BIOPSY SINGLE OR MULTIPLE;  ESOPHAGOSCOPY, GASTROSCOPY, DUODENOSCOPY (EGD), DILATATION ;  Surgeon: Elizabeth Stevenson MD;  Location:  GI     ESOPHAGOSCOPY, GASTROSCOPY, DUODENOSCOPY (EGD), COMBINED  12/31/2013    Procedure: COMBINED ESOPHAGOSCOPY, GASTROSCOPY, DUODENOSCOPY (EGD), BIOPSY SINGLE OR MULTIPLE;;  Surgeon: Clemente Lopez MD;  Location:  GI     ESOPHAGOSCOPY, GASTROSCOPY, DUODENOSCOPY (EGD), COMBINED  4/1/2014    Procedure: COMBINED ESOPHAGOSCOPY, GASTROSCOPY, DUODENOSCOPY (EGD);;  Surgeon: Clemente Lopez MD;  Location:  GI     ESOPHAGOSCOPY, GASTROSCOPY, DUODENOSCOPY (EGD), COMBINED  6/28/2014    Procedure: COMBINED ESOPHAGOSCOPY, GASTROSCOPY, DUODENOSCOPY (EGD);  Surgeon: Clemente Lopez MD;  Location:  GI     ESOPHAGOSCOPY, GASTROSCOPY, DUODENOSCOPY (EGD), COMBINED N/A 8/20/2014    Procedure: COMBINED ESOPHAGOSCOPY, GASTROSCOPY, DUODENOSCOPY (EGD), BIOPSY SINGLE OR MULTIPLE;  Surgeon: Duane, William Charles, MD;  Location:  OR     ESOPHAGOSCOPY, GASTROSCOPY, DUODENOSCOPY (EGD), COMBINED N/A 8/22/2014    Procedure: COMBINED ESOPHAGOSCOPY, GASTROSCOPY, DUODENOSCOPY (EGD), BIOPSY SINGLE OR MULTIPLE;  Surgeon: Mello Ferrer MD;  Location:  GI     ESOPHAGOSCOPY, GASTROSCOPY, DUODENOSCOPY (EGD), COMBINED N/A 10/2/2014    Procedure: COMBINED ESOPHAGOSCOPY, GASTROSCOPY, DUODENOSCOPY (EGD), BIOPSY SINGLE OR MULTIPLE;  Surgeon: Remy Haskins MD;  Location:  GI     ESOPHAGOSCOPY, GASTROSCOPY, DUODENOSCOPY (EGD), COMBINED Left 12/15/2014    Procedure: COMBINED ESOPHAGOSCOPY, GASTROSCOPY, DUODENOSCOPY (EGD), BIOPSY SINGLE OR MULTIPLE;  Surgeon: Remy Haskins MD;  Location:  GI     ESOPHAGOSCOPY, GASTROSCOPY, DUODENOSCOPY (EGD), COMBINED N/A 2/25/2015    Procedure: COMBINED ENDOSCOPIC ULTRASOUND, ESOPHAGOSCOPY, GASTROSCOPY, DUODENOSCOPY (EGD), FINE NEEDLE ASPIRATE/BIOPSY;  Surgeon: Clemente Lugo MD;  Location:  GI     ESOPHAGOSCOPY, GASTROSCOPY, DUODENOSCOPY (EGD),  COMBINED Left 2/25/2015    Procedure: COMBINED ESOPHAGOSCOPY, GASTROSCOPY, DUODENOSCOPY (EGD), BIOPSY SINGLE OR MULTIPLE;  Surgeon: Clemente Lugo MD;  [facility]     ESOPHAGOSCOPY, GASTROSCOPY, DUODENOSCOPY (EGD), COMBINED N/A 9/25/2016    Procedure: COMBINED ESOPHAGOSCOPY, GASTROSCOPY, DUODENOSCOPY (EGD);  Surgeon: Aziza Patiño MD;  [facility]     ESOPHAGOSCOPY, GASTROSCOPY, DUODENOSCOPY (EGD), COMBINED N/A 1/18/2017    Procedure: COMBINED ESOPHAGOSCOPY, GASTROSCOPY, DUODENOSCOPY (EGD), BIOPSY SINGLE OR MULTIPLE;  Surgeon: Clemente Lopez MD;  [facility]     ESOPHAGOSCOPY, GASTROSCOPY, DUODENOSCOPY (EGD), COMBINED N/A 11/26/2017    Procedure: COMBINED ESOPHAGOSCOPY, GASTROSCOPY, DUODENOSCOPY (EGD), REMOVE FOREIGN BODY;  Esophagogastroduodenoscopy with foreign body extraction  ;  Surgeon: Herberth Castrejon MD;  [facility]     ESOPHAGOSCOPY, GASTROSCOPY, DUODENOSCOPY (EGD), COMBINED N/A 11/26/2017    Procedure: COMBINED ESOPHAGOSCOPY, GASTROSCOPY, DUODENOSCOPY (EGD), REMOVE FOREIGN BODY;;  Surgeon: Herberth Castrejon MD;  [facility]     ESOPHAGOSCOPY, GASTROSCOPY, DUODENOSCOPY (EGD), COMBINED N/A 4/10/2020    Procedure: ESOPHAGOGASTRODUODENOSCOPY (EGD);  Surgeon: Yael Cabral MD;  [facility]     FASCIOTOMY LOWER EXTREMITY Left 6/10/2016    Procedure: FASCIOTOMY LOWER EXTREMITY;  Surgeon: Mello Rodriguez MD;  [facility]     HC CAPSULE ENDOSCOPY N/A 8/25/2014    Procedure: CAPSULE/PILL CAM ENDOSCOPY;  Surgeon: Remy Haskins MD;  [facility]     HC CAPSULE ENDOSCOPY N/A 10/2/2014    Procedure: CAPSULE/PILL CAM ENDOSCOPY;  Surgeon: Remy Haskins MD;  [facility]     ORTHOPEDIC SURGERY      broken wrist repair     REVISE CATHETER INTRATHECAL  08/24/2020     SEX TRANSFORMATION SURGERY, MALE TO FEMALE  1974    1974     SINUS SURGERY      cyst removed     TONSILLECTOMY       VASCULAR SURGERY  2006    Left carotid stent     Current Outpatient  Medications   Medication Sig Dispense Refill     ADVAIR DISKUS 250-50 MCG/DOSE inhaler INHALE 1 PUFF BY MOUTH TWICE DAILY 1 Inhaler 5     albuterol (PROAIR HFA/PROVENTIL HFA/VENTOLIN HFA) 108 (90 Base) MCG/ACT inhaler Inhale 2 puffs into the lungs every 6 hours 1 Inhaler 5     albuterol (PROVENTIL) (2.5 MG/3ML) 0.083% neb solution Take 1 vial (2.5 mg) by nebulization every 6 hours as needed for shortness of breath / dyspnea or wheezing 180 mL 3     alendronate (FOSAMAX) 70 MG tablet TAKE ONE TABLET BY MOUTH EVERY 7 DAYS (TAKE WITH 8OZ OF WATER 30 MINUTES BEFORE BREAKFAST AND REMAIN UPRIGHT DURING THIS TIME) 4 tablet 97     ASPERCREME LIDOCAINE 4 % Patch APPLY 1 PATCH TOPICALLY TO LOWER BACK ONCE DAILY (ON FOR 12 HOURS, OFF FOR 12 HOURS) 30 patch 10     ASPIRIN LOW DOSE 81 MG chewable tablet CHEW AND SWALLOW ONE TABLET BY MOUTH IN THE MORNING 28 tablet 10     atorvastatin (LIPITOR) 40 MG tablet TAKE 1 TABLET BY MOUTH AT BEDTIME 28 tablet 10     blood glucose monitoring (ONE TOUCH ULTRA 2) meter device kit Use to test blood sugars 3 times daily or as directed. 1 kit 0     blood glucose monitoring (ONE TOUCH ULTRA) test strip Use to test blood sugars 3 times daily or as directed. 3 Box 3     blood glucose monitoring (ONE TOUCH ULTRASOFT) lancets Use to test blood sugar 3 times daily or as directed. 100 each PRN     brimonidine (ALPHAGAN) 0.2 % ophthalmic solution Place 1 drop into the right eye 2 times daily ( 12 hours apart).    Please keep 11-16-20 appt for refills. 5 mL 4     capsaicin-menthol-methyl sal 0.025-1-12 % external cream Apply 1 Application topically daily as needed (topical pain) 56.6 g 1     diclofenac (VOLTAREN) 1 % topical gel APPLY 2GM TOPICALLY TO AFFECTED AREA(S) FOUR TIMES A  g 3     dorzolamide (TRUSOPT) 2 % ophthalmic solution Place 1 drop into the right eye 2 times daily 1 Bottle 1     escitalopram (LEXAPRO) 10 MG tablet TAKE 1 TABLET BY MOUTH ONCE DAILY 28 tablet 10     FEROSUL 325 (65  Fe) MG tablet TAKE 1 TABLET BY MOUTH TWICE DAILY WITH MEALS 56 tablet 10     fluticasone (FLONASE) 50 MCG/ACT nasal spray Spray 2 sprays into both nostrils daily 15.8 mL 1     INCRUSE ELLIPTA 62.5 MCG/INH Inhaler INHALE 1 PUFF BY MOUTH ONCE DAILY 30 each 10     lactulose (CHRONULAC) 10 GM/15ML solution Take 30 mLs (20 g) by mouth as needed for constipation 236 mL 0     latanoprost (XALATAN) 0.005 % ophthalmic solution Place 1 drop into both eyes At Bedtime 2.5 mL 11     levETIRAcetam (KEPPRA) 500 MG tablet TAKE 1 TABLET BY MOUTH TWICE DAILY 56 tablet 11     lisinopril (ZESTRIL) 20 MG tablet TAKE 1 TABLET BY MOUTH EVERY MORNING 28 tablet 11     loratadine (CLARITIN) 10 MG tablet TAKE 1 TABLET BY MOUTH ONCE DAILY 28 tablet 11     Menthol, Topical Analgesic, 5 % GEL Externally apply topically daily as needed 113 g 0     metFORMIN (GLUCOPHAGE) 500 MG tablet TAKE 1 TABLET BY MOUTH EVERY EVENING WITH DINNER 28 tablet 11     metoprolol succinate ER (TOPROL-XL) 50 MG 24 hr tablet TAKE 1 TABLET BY MOUTH EVERY MORNING 30 tablet 10     Multiple Vitamins-Minerals (TAB-A-MACY) TABS TAKE 1 TABLET BY MOUTH ONCE DAILY 28 tablet 11     multivitamin, therapeutic (THERA-VIT) TABS tablet Take 1 tablet by mouth daily       nicotine (COMMIT) 2 MG lozenge Place 1 lozenge (2 mg) inside cheek every hour as needed for smoking cessation 60 lozenge 11     nicotine (NICODERM CQ) 14 MG/24HR 24 hr patch Place 1 patch onto the skin every 24 hours 30 patch 11     nitroGLYcerin (NITROSTAT) 0.4 MG sublingual tablet DISSOLVE ONE TABLET UNDER TONGUE AS NEEDED FOR CHEST PAIN MAY REPEAT EVERY 5 MINUTES FOR 3 DOSES, IF SYMPTOMS PERSIST CALL 911 25 tablet 3     Nutritional Supplements (ENSURE NUTRITION SHAKE) LIQD Take 1 Bottle by mouth 2 times daily With meals 60 Bottle 0     pantoprazole (PROTONIX) 40 MG EC tablet TAKE 1 TABLET BY MOUTH EVERY MORNING 28 tablet 11     phenytoin (DILANTIN) 100 MG capsule TAKE 2 CAPS (200MG) BY MOUTH TWICE A  capsule  11     phenytoin sodium extended (DILANTIN) 200 MG capsule TAKE 1 CAPSULE BY MOUTH TWICE DAILY 56 capsule 11     polyethylene glycol (MIRALAX) 17 GM/SCOOP powder MIX 17GM OF POWDER IN 8OZ OF WATER UNTIL COMPLETELY DISSOLVED. DRINK SOLUTION BY MOUTH IN THE MORNING 510 g 8     pregabalin (LYRICA) 150 MG capsule TAKE 1 CAPSULE BY MOUTH THREE TIMES DAILY 90 capsule 4     risperiDONE (RISPERDAL) 0.5 MG tablet TAKE 1 TABLET BY MOUTH AT BEDTIME 28 tablet 4     SENEXON-S 8.6-50 MG tablet TAKE 1 TABLET BY MOUTH TWICE DAILY 56 tablet 10     sennosides (SENOKOT) 8.6 MG tablet Take 1 tablet by mouth 2 times daily        solifenacin (VESICARE) 10 MG tablet TAKE 1 TABLET BY MOUTH EVERY MORNING 28 tablet 10     traZODone (DESYREL) 150 MG tablet TAKE 1 TABLET BY MOUTH AT BEDTIME 28 tablet 10     vitamin D3 (CHOLECALCIFEROL) 10 MCG (400 UNIT) capsule Take 2 capsules by mouth daily       Vitamin D3 (VITAMIN D) 10 MCG (400 UNIT) tablet TAKE TWO TABLETS (800 UNITS) BY MOUTH ONCE DAILY 56 tablet 11       Allergies   Allergen Reactions     Bee Venom Anaphylaxis     Penicillins Anaphylaxis     Dilantin [Phenytoin] Other (See Comments)     Generic dilantin only per pt     Iodine Hives     Novocaine [Procaine] Hives     Tositumomab Unknown        Social History     Tobacco Use     Smoking status: Current Every Day Smoker     Packs/day: 1.50     Years: 30.00     Pack years: 45.00     Types: Cigarettes, Cigars     Smokeless tobacco: Never Used     Tobacco comment: 1.5 packs per day    Substance Use Topics     Alcohol use: No     Alcohol/week: 0.0 standard drinks     History   Drug Use No            Objective   /54   Pulse 73   Temp 97.7  F (36.5  C) (Temporal)   Resp 16   Wt 56.7 kg (125 lb)   SpO2 98%   BMI 47.53 kg/m    Physical Exam  Physical Exam    GENERAL APPEARANCE:  alert and mild pain     EYES: EOMI, PERRL     HENT: ear canals and TM's normal and nose and mouth without ulcers or lesions     NECK: no adenopathy, no  asymmetry, masses, or scars and thyroid normal to palpation     RESP: lungs clear to auscultation - no rales, rhonchi or wheezes     CV: regular rates and rhythm, normal S1 S2, no S3 or S4 and no click or rub     ABDOMEN:  soft, nontender, no HSM or masses and bowel sounds normal, pain pump now abdominal wall, left.     MS: Noted wheelchair with bilateral lower extremity amputations.     NEURO: No focal changes     PSYCH: mentation appears normal and affect normal/bright per baseline    Recent Labs   Lab Test 08/19/20  1433 06/12/20  0421 06/11/20  0548 06/11/20  0000 12/16/19  1258 12/16/19  1258   HGB 13.0 11.2* 11.7  --    < >  --    PLT  --  253 287  --    < >  --    INR  --   --  1.12 1.06  --   --     141 137 137   < >  --    POTASSIUM 4.2 3.8 4.6 4.2   < >  --    CR 0.52 0.49* 0.57 0.51*   < >  --    A1C 5.0  --   --   --   --  5.0    < > = values in this interval not displayed.        PRE-OP Diagnostics:  Labs pending at this time.  Results will be reviewed when available.  EKG  not Repeated: 8/19/20, There is a normal sinus rhythm with a sinus bradycardia and a heart rate of 58 noted.  There is nonspecific ST changes noted which are likely repolarization variants.  This EKG was compared to that which was done on May 14 of 2020 and appears to be essentially unchanged.      Echocardiogram done 6/2020 with no change clinical status.  See below:     Interpretation Summary Echocardiogram 6/11/20  Global and regional left ventricular function is normal with an EF of 60-65%.  No regional wall motion abnormalities are seen.  Right ventricular function, chamber size, wall motion, and thickness are  normal.  No hemodynamically significant valvular abnoramalities were noted.  The inferior vena cava was normal in size with preserved respiratory  variability.  No pericardial effusion is present.     This study was compared with the study from 11/13/2019 . There has been no  change.     Assessment & Plan:     The  proposed surgical procedure is considered LOW/mild risk.     REVISED CARDIAC RISK INDEX  The patient has the following serious cardiovascular risks for perioperative complications:  Coronary Artery Disease (MI, positive stress test, angina, Qs on EKG) = 1 point  Diabetes Mellitus (on Insulin) = 1 point     INTERPRETATION: 2 points: Class III (moderate risk - 6.6% complication rate)              -Wheelchair-bound.     1. Preop general physical exam  Surgery as scheduled and discussed with patient routine follow-up    Noted neurogenic bladder and scheduled for surgery as noted     2. Chronic pain syndrome  Ongoing issues with chronic pain with recent  reviewed     3. Type 2 diabetes mellitus with other circulatory complication, without long-term current use of insulin (H)        Lab Results   Component Value Date     A1C 5.0 12/16/2019     A1C 5.0 04/17/2019     A1C 5.1 06/06/2018     A1C 4.4 05/02/2018     A1C 4.5 08/24/2017   Stable and discussed holding a.m. blood sugar medications while n.p.o.     4. Chronic obstructive pulmonary disease, unspecified COPD type (H)  Continuing with current clinical inhalers as noted     5. Seizure disorder (H)  Stable and advised to dose meds if clinical seizures without significant change     6. Amputation above knee (H)  Note baseline bilateral amputation     7. Benign essential hypertension  Stable on therapy continue with current medical management     The patient has the following additional risks and recommendations for perioperative complications:      - No identified additional risk factors other than previously addressed     MEDICATION INSTRUCTIONS:     Anticoagulant or Antiplatelet Medication Use   - Bleeding risk is low for this procedure (e.g. dental, skin, cataract).  Would advise holding all anticoagulants per surgery recommendations     DIABETIC Medications:   - METFORMIN: HOLD day of surgery.(AM)    Advised to stop all anti-inflammatories prior to surgical  procedure as needed.     RECOMMENDATION:  APPROVAL GIVEN to proceed with proposed procedure, without further diagnostic evaluation.     Labs reviewed and forms + prior EKG will be faxed.     Return for f/u with surgery for routine post-op.      Copy of this evaluation report is provided to requesting physician, Dr. Jauregui    Signed Electronically by: Allan Casey MD    [facility] Preop Guidelines    Revised Cardiac Risk Index

## 2020-10-01 NOTE — LETTER
[facility]  [address] [address]  [phone]      10/1/2020       Sonya Foote  [address]   [place] [address]        Dear Sonya,    If you are interested in pursuing a mammogram I have placed orders for you.    Sincerely,      Allan Casey MD  Internal Medicine

## 2020-10-02 DIAGNOSIS — R39.89 POSSIBLE URINARY TRACT INFECTION: ICD-10-CM

## 2020-10-02 DIAGNOSIS — Z01.818 PREOP GENERAL PHYSICAL EXAM: ICD-10-CM

## 2020-10-02 DIAGNOSIS — Z01.818 PREOP GENERAL PHYSICAL EXAM: Primary | ICD-10-CM

## 2020-10-02 DIAGNOSIS — N31.9 NEUROGENIC BLADDER: ICD-10-CM

## 2020-10-05 DIAGNOSIS — H40.1133 PRIMARY OPEN ANGLE GLAUCOMA OF BOTH EYES, SEVERE STAGE: ICD-10-CM

## 2020-10-05 DIAGNOSIS — Z11.59 ENCOUNTER FOR SCREENING FOR OTHER VIRAL DISEASES: ICD-10-CM

## 2020-10-05 LAB
BACTERIA SPEC CULT: ABNORMAL
BACTERIA SPEC CULT: ABNORMAL
SPECIMEN SOURCE: ABNORMAL

## 2020-10-05 PROCEDURE — 99000 SPECIMEN HANDLING OFFICE-LAB: CPT | Performed by: PATHOLOGY

## 2020-10-05 PROCEDURE — U0003 INFECTIOUS AGENT DETECTION BY NUCLEIC ACID (DNA OR RNA); SEVERE ACUTE RESPIRATORY SYNDROME CORONAVIRUS 2 (SARS-COV-2) (CORONAVIRUS DISEASE [COVID-19]), AMPLIFIED PROBE TECHNIQUE, MAKING USE OF HIGH THROUGHPUT TECHNOLOGIES AS DESCRIBED BY CMS-2020-01-R: HCPCS | Mod: 90 | Performed by: PATHOLOGY

## 2020-10-06 DIAGNOSIS — N39.0 URINARY TRACT INFECTION: Primary | ICD-10-CM

## 2020-10-06 LAB
SARS-COV-2 RNA SPEC QL NAA+PROBE: NOT DETECTED
SPECIMEN SOURCE: NORMAL

## 2020-10-06 RX ORDER — CIPROFLOXACIN 500 MG/1
500 TABLET, FILM COATED ORAL 2 TIMES DAILY
Qty: 10 TABLET | Refills: 0 | Status: SHIPPED | OUTPATIENT
Start: 2020-10-06 | End: 2020-10-11

## 2020-10-06 RX ORDER — LATANOPROST 50 UG/ML
SOLUTION/ DROPS OPHTHALMIC
Qty: 2.5 ML | Refills: 10 | Status: SHIPPED | OUTPATIENT
Start: 2020-10-06

## 2020-10-07 ENCOUNTER — ANESTHESIA EVENT (OUTPATIENT)
Dept: SURGERY | Facility: CLINIC | Age: 70
End: 2020-10-07
Payer: COMMERCIAL

## 2020-10-07 RX ORDER — ONDANSETRON 4 MG/1
4 TABLET, ORALLY DISINTEGRATING ORAL EVERY 30 MIN PRN
Status: CANCELLED | OUTPATIENT
Start: 2020-10-07

## 2020-10-07 RX ORDER — HYDROMORPHONE HYDROCHLORIDE 1 MG/ML
.3-.5 INJECTION, SOLUTION INTRAMUSCULAR; INTRAVENOUS; SUBCUTANEOUS EVERY 5 MIN PRN
Status: CANCELLED | OUTPATIENT
Start: 2020-10-07

## 2020-10-07 RX ORDER — ONDANSETRON 2 MG/ML
4 INJECTION INTRAMUSCULAR; INTRAVENOUS EVERY 30 MIN PRN
Status: CANCELLED | OUTPATIENT
Start: 2020-10-07

## 2020-10-07 RX ORDER — SODIUM CHLORIDE, SODIUM LACTATE, POTASSIUM CHLORIDE, CALCIUM CHLORIDE 600; 310; 30; 20 MG/100ML; MG/100ML; MG/100ML; MG/100ML
INJECTION, SOLUTION INTRAVENOUS CONTINUOUS
Status: CANCELLED | OUTPATIENT
Start: 2020-10-07

## 2020-10-07 RX ORDER — FENTANYL CITRATE 50 UG/ML
25-50 INJECTION, SOLUTION INTRAMUSCULAR; INTRAVENOUS
Status: CANCELLED | OUTPATIENT
Start: 2020-10-07

## 2020-10-07 RX ORDER — NALOXONE HYDROCHLORIDE 0.4 MG/ML
.1-.4 INJECTION, SOLUTION INTRAMUSCULAR; INTRAVENOUS; SUBCUTANEOUS
Status: CANCELLED | OUTPATIENT
Start: 2020-10-07 | End: 2020-10-08

## 2020-10-07 RX ORDER — LABETALOL HYDROCHLORIDE 5 MG/ML
10 INJECTION, SOLUTION INTRAVENOUS
Status: CANCELLED | OUTPATIENT
Start: 2020-10-07

## 2020-10-08 ENCOUNTER — HOSPITAL ENCOUNTER (OUTPATIENT)
Facility: CLINIC | Age: 70
Discharge: HOME | End: 2020-10-08
Attending: UROLOGY | Admitting: UROLOGY
Payer: COMMERCIAL

## 2020-10-08 ENCOUNTER — ANESTHESIA (OUTPATIENT)
Dept: SURGERY | Facility: CLINIC | Age: 70
End: 2020-10-08
Payer: COMMERCIAL

## 2020-10-08 VITALS
OXYGEN SATURATION: 100 % | TEMPERATURE: 97.4 F | BODY MASS INDEX: 27.84 KG/M2 | HEIGHT: 55 IN | DIASTOLIC BLOOD PRESSURE: 61 MMHG | HEART RATE: 61 BPM | RESPIRATION RATE: 14 BRPM | WEIGHT: 120.3 LBS | SYSTOLIC BLOOD PRESSURE: 111 MMHG

## 2020-10-08 DIAGNOSIS — N31.9 NEUROGENIC BLADDER: ICD-10-CM

## 2020-10-08 DIAGNOSIS — N31.9 NEUROGENIC BLADDER: Primary | ICD-10-CM

## 2020-10-08 LAB
GLUCOSE BLDC GLUCOMTR-MCNC: 86 MG/DL (ref 70–99)
GLUCOSE BLDC GLUCOMTR-MCNC: 96 MG/DL (ref 70–99)

## 2020-10-08 PROCEDURE — 999N001017 HC STATISTIC GLUCOSE BY METER IP

## 2020-10-08 PROCEDURE — 258N000003 HC RX IP 258 OP 636: Performed by: NURSE ANESTHETIST, CERTIFIED REGISTERED

## 2020-10-08 PROCEDURE — 761N000007 HC RECOVERY PHASE 2 EACH 15 MINS: Performed by: UROLOGY

## 2020-10-08 PROCEDURE — 52287 CYSTOSCOPY CHEMODENERVATION: CPT | Mod: GC | Performed by: UROLOGY

## 2020-10-08 PROCEDURE — 250N000013 HC RX MED GY IP 250 OP 250 PS 637: Performed by: STUDENT IN AN ORGANIZED HEALTH CARE EDUCATION/TRAINING PROGRAM

## 2020-10-08 PROCEDURE — 250N000011 HC RX IP 250 OP 636: Performed by: NURSE ANESTHETIST, CERTIFIED REGISTERED

## 2020-10-08 PROCEDURE — 370N000002 HC ANESTHESIA TECHNICAL FEE, EACH ADDTL 15 MIN: Performed by: UROLOGY

## 2020-10-08 PROCEDURE — 370N000001 HC ANESTHESIA TECHNICAL FEE, 1ST 30 MIN: Performed by: UROLOGY

## 2020-10-08 PROCEDURE — 250N000020 HC RX IP 250 OP 636 J0585: Performed by: UROLOGY

## 2020-10-08 PROCEDURE — 360N000016 HC SURGERY LEVEL 2 1ST 30 MIN - UMMC: Performed by: UROLOGY

## 2020-10-08 PROCEDURE — 999N000140 HC STATISTIC PRE-PROCEDURE ASSESSMENT III: Performed by: UROLOGY

## 2020-10-08 PROCEDURE — 250N000013 HC RX MED GY IP 250 OP 250 PS 637: Performed by: NURSE ANESTHETIST, CERTIFIED REGISTERED

## 2020-10-08 PROCEDURE — 360N000017 HC SURGERY LEVEL 2 EA 15 ADDTL MIN - UMMC: Performed by: UROLOGY

## 2020-10-08 PROCEDURE — 272N000001 HC OR GENERAL SUPPLY STERILE: Performed by: UROLOGY

## 2020-10-08 RX ORDER — GABAPENTIN 300 MG/1
300 CAPSULE ORAL ONCE
Status: COMPLETED | OUTPATIENT
Start: 2020-10-08 | End: 2020-10-08

## 2020-10-08 RX ORDER — SODIUM CHLORIDE, SODIUM LACTATE, POTASSIUM CHLORIDE, CALCIUM CHLORIDE 600; 310; 30; 20 MG/100ML; MG/100ML; MG/100ML; MG/100ML
INJECTION, SOLUTION INTRAVENOUS CONTINUOUS PRN
Status: DISCONTINUED | OUTPATIENT
Start: 2020-10-08 | End: 2020-10-08

## 2020-10-08 RX ORDER — ACETAMINOPHEN 325 MG/1
975 TABLET ORAL ONCE
Status: COMPLETED | OUTPATIENT
Start: 2020-10-08 | End: 2020-10-08

## 2020-10-08 RX ORDER — SODIUM CHLORIDE, SODIUM LACTATE, POTASSIUM CHLORIDE, CALCIUM CHLORIDE 600; 310; 30; 20 MG/100ML; MG/100ML; MG/100ML; MG/100ML
INJECTION, SOLUTION INTRAVENOUS CONTINUOUS
Status: DISCONTINUED | OUTPATIENT
Start: 2020-10-08 | End: 2020-10-08 | Stop reason: HOSPADM

## 2020-10-08 RX ORDER — ALBUTEROL SULFATE 90 UG/1
AEROSOL, METERED RESPIRATORY (INHALATION) PRN
Status: DISCONTINUED | OUTPATIENT
Start: 2020-10-08 | End: 2020-10-08

## 2020-10-08 RX ORDER — FENTANYL CITRATE 50 UG/ML
INJECTION, SOLUTION INTRAMUSCULAR; INTRAVENOUS PRN
Status: DISCONTINUED | OUTPATIENT
Start: 2020-10-08 | End: 2020-10-08

## 2020-10-08 RX ORDER — CIPROFLOXACIN 2 MG/ML
400 INJECTION, SOLUTION INTRAVENOUS EVERY 12 HOURS
Status: DISCONTINUED | OUTPATIENT
Start: 2020-10-08 | End: 2020-10-08 | Stop reason: CLARIF

## 2020-10-08 RX ORDER — ONDANSETRON 2 MG/ML
INJECTION INTRAMUSCULAR; INTRAVENOUS PRN
Status: DISCONTINUED | OUTPATIENT
Start: 2020-10-08 | End: 2020-10-08

## 2020-10-08 RX ORDER — LIDOCAINE 40 MG/G
CREAM TOPICAL
Status: DISCONTINUED | OUTPATIENT
Start: 2020-10-08 | End: 2020-10-08 | Stop reason: HOSPADM

## 2020-10-08 RX ORDER — PROPOFOL 10 MG/ML
INJECTION, EMULSION INTRAVENOUS CONTINUOUS PRN
Status: DISCONTINUED | OUTPATIENT
Start: 2020-10-08 | End: 2020-10-08

## 2020-10-08 RX ORDER — DEXAMETHASONE SODIUM PHOSPHATE 4 MG/ML
INJECTION, SOLUTION INTRA-ARTICULAR; INTRALESIONAL; INTRAMUSCULAR; INTRAVENOUS; SOFT TISSUE PRN
Status: DISCONTINUED | OUTPATIENT
Start: 2020-10-08 | End: 2020-10-08

## 2020-10-08 RX ADMIN — PROPOFOL 100 MCG/KG/MIN: 10 INJECTION, EMULSION INTRAVENOUS at 12:48

## 2020-10-08 RX ADMIN — ONDANSETRON 4 MG: 2 INJECTION INTRAMUSCULAR; INTRAVENOUS at 13:05

## 2020-10-08 RX ADMIN — ALBUTEROL SULFATE 2 PUFF: 108 INHALANT RESPIRATORY (INHALATION) at 12:40

## 2020-10-08 RX ADMIN — DEXAMETHASONE SODIUM PHOSPHATE 4 MG: 4 INJECTION, SOLUTION INTRA-ARTICULAR; INTRALESIONAL; INTRAMUSCULAR; INTRAVENOUS; SOFT TISSUE at 13:05

## 2020-10-08 RX ADMIN — SODIUM CHLORIDE, POTASSIUM CHLORIDE, SODIUM LACTATE AND CALCIUM CHLORIDE: 600; 310; 30; 20 INJECTION, SOLUTION INTRAVENOUS at 12:48

## 2020-10-08 RX ADMIN — ACETAMINOPHEN 975 MG: 325 TABLET, FILM COATED ORAL at 12:13

## 2020-10-08 RX ADMIN — PHENYLEPHRINE HYDROCHLORIDE 100 MCG: 10 INJECTION INTRAVENOUS at 13:17

## 2020-10-08 RX ADMIN — FENTANYL CITRATE 25 MCG: 50 INJECTION, SOLUTION INTRAMUSCULAR; INTRAVENOUS at 13:05

## 2020-10-08 RX ADMIN — GABAPENTIN 300 MG: 300 CAPSULE ORAL at 12:14

## 2020-10-08 ASSESSMENT — LIFESTYLE VARIABLES: TOBACCO_USE: 1

## 2020-10-08 ASSESSMENT — MIFFLIN-ST. JEOR: SCORE: 712.31

## 2020-10-08 ASSESSMENT — ENCOUNTER SYMPTOMS: SEIZURES: 1

## 2020-10-08 ASSESSMENT — COPD QUESTIONNAIRES: COPD: 1

## 2020-10-08 NOTE — OR NURSING
Pt. States neighbor will be checking in on her. Assisted living facility aware that patient had anesthesia. Transport called and pt. Will be brought to hospital lobby when they arrive.

## 2020-10-08 NOTE — ANESTHESIA CARE TRANSFER NOTE
Patient: Sonya Green    Procedure(s):  CYSTOSCOPY, INJECT BOTOX INTO BLADDER, SUPRAPUBIC TUBE EXCHNAGE    Diagnosis: Neurogenic bladder [N31.9]  Diagnosis Additional Information: No value filed.    Anesthesia Type:   MAC     Note:  Airway :Room Air  Patient transferred to:Phase II  Handoff Report: Allowed opportunity for questions and acknowledgement of understanding      Vitals: (Last set prior to Anesthesia Care Transfer)    CRNA VITALS  10/8/2020 1259 - 10/8/2020 1356      10/8/2020             Resp Rate (observed):  (!) 1                Electronically Signed By: VICTOR M Way CRNA  October 8, 2020  1:56 PM

## 2020-10-08 NOTE — ANESTHESIA PREPROCEDURE EVALUATION
Anesthesia Pre-Procedure Evaluation    Patient: Sonya Foote   MRN:     [id number] Gender:   female   Age:    70 year old :      1950        Preoperative Diagnosis: Neurogenic bladder [N31.9]   Procedure(s):  CYSTOSCOPY, WITH BOTULINUM TOXIN INJECTION  Latex Free     LABS:  CBC:   Lab Results   Component Value Date    WBC 9.7 2020    WBC 11.6 (H) 2020    HGB 12.2 10/01/2020    HGB 13.0 2020    HCT 34.0 (L) 2020    HCT 34.6 (L) 2020     10/01/2020     2020     BMP:   Lab Results   Component Value Date     2020     2020    POTASSIUM 4.2 10/01/2020    POTASSIUM 4.2 2020    CHLORIDE 106 2020    CHLORIDE 108 2020    CO2 24 2020    CO2 27 2020    BUN 12 2020    BUN 13 2020    CR 0.52 2020    CR 0.49 (L) 2020    GLC 84 10/01/2020    GLC 80 2020     COAGS:   Lab Results   Component Value Date    PTT 27 2020    INR 1.12 2020    FIBR 497 (H) 2020     POC:   Lab Results   Component Value Date     (H) 2020     OTHER:   Lab Results   Component Value Date    PH 7.40 2015    LACT 1.0 2019    A1C 5.0 2020    CAROL 8.9 2020    PHOS 3.2 10/08/2017    MAG 1.9 2018    ALBUMIN 2.8 (L) 2020    PROTTOTAL 8.5 2020    ALT 64 (H) 2020    AST 44 2020    ALKPHOS 262 (H) 2020    BILITOTAL 0.1 (L) 2020    LIPASE 129 2018    ALEX 32 2016    TSH 1.09 2020    T4 0.88 2013    CRP 24.0 (H) 2020    SED 72 (H) 2015        Preop Vitals    BP Readings from Last 3 Encounters:   10/01/20 108/54   20 104/66   20 98/64    Pulse Readings from Last 3 Encounters:   10/01/20 73   20 72   20 65      Resp Readings from Last 3 Encounters:   10/01/20 16   20 18   20 15    SpO2 Readings from Last 3 Encounters:   10/01/20 98%   20 92%   20 100%      Temp  "Readings from Last 1 Encounters:   10/01/20 36.5  C (97.7  F) (Temporal)    Ht Readings from Last 1 Encounters:   08/31/20 1.092 m (3' 7\")      Wt Readings from Last 1 Encounters:   10/01/20 56.7 kg (125 lb)    Estimated body mass index is 47.53 kg/m  as calculated from the following:    Height as of 8/31/20: 1.092 m (3' 7\").    Weight as of 10/1/20: 56.7 kg (125 lb).     LDA:  Suprapubic Catheter Non-latex 16 fr (Active)   Number of days: 140        Past Medical History:   Diagnosis Date     Acid reflux disease      Amputation above knee (H)     bilateral     Benign essential hypertension      Blind left eye     due to central retinal artery occlusion     CAD (coronary artery disease)     UA and inferior MI in 2016 s/p PCI     Chronic back pain      Chronic neck pain      Chronic pain syndrome     with fentanyl intrathecal pain pump     Complex sleep apnea syndrome      COPD (chronic obstructive pulmonary disease) (H)      Dysphagia     chronic due to esophageal strictures and multiple previous dilitations      ROGER (generalized anxiety disorder)      History of blood transfusion     x5; no adverse reactions     History of central retinal artery occlusion 2006    left-sided     History of stroke     residual left-sided weakness     Hx of carotid artery stenosis     s/p left carotid stent in 2006 and balloon angioplasty in 2016     MDD (major depressive disorder)      Neurogenic bladder     SPT in place     JOSE (obstructive sleep apnea)      Osteoporosis      PAD (peripheral artery disease) (H)      Peripheral neuropathy      Person who has had sex change operation     male to female     Seizure disorder (H)     many years since last grand mal; daily, brief petit mals     Sickle cell trait (H)      Type 2 diabetes mellitus (H)       Past Surgical History:   Procedure Laterality Date     AMPUTATE LEG ABOVE KNEE Left 6/11/2016    Procedure: AMPUTATE LEG ABOVE KNEE;  Surgeon: Mello Rodriguez MD;  Location:  OR     " AMPUTATE LEG BELOW KNEE Right 11/7/2016    Procedure: AMPUTATE LEG BELOW KNEE;  Surgeon: Savannah Durant MD;  [facility]: [facility]     AMPUTATE REVISION STUMP LOWER EXTREMITY Right 11/11/2016    Procedure: AMPUTATE REVISION STUMP LOWER EXTREMITY;  Surgeon: Savannah Durant MD;  [facility]     AMPUTATE REVISION STUMP LOWER EXTREMITY Right 11/16/2016    Procedure: AMPUTATE REVISION STUMP LOWER EXTREMITY;  Surgeon: Savannah Durant MD;  [facility]     AMPUTATE TOE(S) Right 1/5/2016    Procedure: AMPUTATE TOE(S);  Surgeon: Mello Gaines DPM;  Location: [facility]     ANGIOGRAM Bilateral 11/21/2014    Procedure: ANGIOGRAM;  Surgeon: Savannah Durant MD;  [facility]     ANGIOGRAM Left 1/16/2015    Procedure: ANGIOGRAM;  Surgeon: Savannah Durant MD;  [facility]     ANGIOGRAM Bilateral 9/14/2015    Procedure: ANGIOGRAM;  Surgeon: Savannah Durant MD;  [facility]     ANGIOGRAM Left 10/12/2015    Procedure: ANGIOGRAM;  Surgeon: Savannah Durant MD;  [facility]     ANGIOGRAM Right 6/6/2016    Procedure: ANGIOGRAM;  Surgeon: Savannah Durant MD;  [facility]     ANGIOPLASTY Right 6/6/2016    Procedure: ANGIOPLASTY;  Surgeon: Savannah Durant MD;  [facility]     APPENDECTOMY       BREAST BIOPSY, RT/LT Right     benign     CATARACT IOL, RT/LT Right      CHOLECYSTECTOMY       COLONOSCOPY N/A 8/25/2014    Procedure: COLONOSCOPY;  Surgeon: Mello Ferrer MD;  [facility]     COLONOSCOPY WITH CO2 INSUFFLATION N/A 8/20/2014    Procedure: COLONOSCOPY WITH CO2 INSUFFLATION;  Surgeon: Duane, William Charles, MD;  Location: [facility] OR     CYSTOSCOPY, INJECT BOTOX N/A 5/21/2020    Procedure: CYSTOSCOPY, WITH BOTULINUM TOXIN INJECTION;  Surgeon: Loki Gordon MD;  [facility]     CYSTOSCOPY, INTRAVESICAL INJECTION N/A 10/31/2019    Procedure: CYSTOSCOPY, BOTOX INJECTION, Suprapubic Catheter Exchange;  Surgeon: Loki Gordon MD;  [facility]     CYSTOSTOMY, INSERT TUBE SUPRAPUBIC, COMBINED N/A 1/16/2018     Procedure: COMBINED CYSTOSTOMY, INSERT TUBE SUPRAPUBIC;  Cystoscopy, Intraoperative Ultrasound, Suprapubic Tube Placement;  Surgeon: Keanu Dawson MD;  [facility]     ENDARTERECTOMY FEMORAL  5/23/2014    Procedure: ENDARTERECTOMY FEMORAL;  Surgeon: Jason Joshi MD;  [facility]     ESOPHAGOSCOPY, GASTROSCOPY, DUODENOSCOPY (EGD), COMBINED  12/14/2012    Procedure: COMBINED ESOPHAGOSCOPY, GASTROSCOPY, DUODENOSCOPY (EGD), BIOPSY SINGLE OR MULTIPLE;  ESOPHAGOSCOPY, GASTROSCOPY, DUODENOSCOPY (EGD), DILATATION ;  Surgeon: Elizabeth Stevenson MD;  Location:  GI     ESOPHAGOSCOPY, GASTROSCOPY, DUODENOSCOPY (EGD), COMBINED  12/31/2013    Procedure: COMBINED ESOPHAGOSCOPY, GASTROSCOPY, DUODENOSCOPY (EGD), BIOPSY SINGLE OR MULTIPLE;;  Surgeon: Clemente Lopez MD;  Location:  GI     ESOPHAGOSCOPY, GASTROSCOPY, DUODENOSCOPY (EGD), COMBINED  4/1/2014    Procedure: COMBINED ESOPHAGOSCOPY, GASTROSCOPY, DUODENOSCOPY (EGD);;  Surgeon: Clemente Lopez MD;  Location:  GI     ESOPHAGOSCOPY, GASTROSCOPY, DUODENOSCOPY (EGD), COMBINED  6/28/2014    Procedure: COMBINED ESOPHAGOSCOPY, GASTROSCOPY, DUODENOSCOPY (EGD);  Surgeon: Clemente Lopez MD;  Location:  GI     ESOPHAGOSCOPY, GASTROSCOPY, DUODENOSCOPY (EGD), COMBINED N/A 8/20/2014    Procedure: COMBINED ESOPHAGOSCOPY, GASTROSCOPY, DUODENOSCOPY (EGD), BIOPSY SINGLE OR MULTIPLE;  Surgeon: Duane, William Charles, MD;  Location:  OR     ESOPHAGOSCOPY, GASTROSCOPY, DUODENOSCOPY (EGD), COMBINED N/A 8/22/2014    Procedure: COMBINED ESOPHAGOSCOPY, GASTROSCOPY, DUODENOSCOPY (EGD), BIOPSY SINGLE OR MULTIPLE;  Surgeon: Mello Ferrer MD;  Location:  GI     ESOPHAGOSCOPY, GASTROSCOPY, DUODENOSCOPY (EGD), COMBINED N/A 10/2/2014    Procedure: COMBINED ESOPHAGOSCOPY, GASTROSCOPY, DUODENOSCOPY (EGD), BIOPSY SINGLE OR MULTIPLE;  Surgeon: Remy Haskins MD;  Location:  GI     ESOPHAGOSCOPY, GASTROSCOPY, DUODENOSCOPY (EGD), COMBINED Left 12/15/2014     Procedure: COMBINED ESOPHAGOSCOPY, GASTROSCOPY, DUODENOSCOPY (EGD), BIOPSY SINGLE OR MULTIPLE;  Surgeon: Remy Haskins MD;  [facility]     ESOPHAGOSCOPY, GASTROSCOPY, DUODENOSCOPY (EGD), COMBINED N/A 2/25/2015    Procedure: COMBINED ENDOSCOPIC ULTRASOUND, ESOPHAGOSCOPY, GASTROSCOPY, DUODENOSCOPY (EGD), FINE NEEDLE ASPIRATE/BIOPSY;  Surgeon: Clemente Lugo MD;  [facility]     ESOPHAGOSCOPY, GASTROSCOPY, DUODENOSCOPY (EGD), COMBINED Left 2/25/2015    Procedure: COMBINED ESOPHAGOSCOPY, GASTROSCOPY, DUODENOSCOPY (EGD), BIOPSY SINGLE OR MULTIPLE;  Surgeon: Clemente Lugo MD;  [facility]     ESOPHAGOSCOPY, GASTROSCOPY, DUODENOSCOPY (EGD), COMBINED N/A 9/25/2016    Procedure: COMBINED ESOPHAGOSCOPY, GASTROSCOPY, DUODENOSCOPY (EGD);  Surgeon: Aziza Patiño MD;  [facility]     ESOPHAGOSCOPY, GASTROSCOPY, DUODENOSCOPY (EGD), COMBINED N/A 1/18/2017    Procedure: COMBINED ESOPHAGOSCOPY, GASTROSCOPY, DUODENOSCOPY (EGD), BIOPSY SINGLE OR MULTIPLE;  Surgeon: Clemente Lopez MD;  [facility]     ESOPHAGOSCOPY, GASTROSCOPY, DUODENOSCOPY (EGD), COMBINED N/A 11/26/2017    Procedure: COMBINED ESOPHAGOSCOPY, GASTROSCOPY, DUODENOSCOPY (EGD), REMOVE FOREIGN BODY;  Esophagogastroduodenoscopy with foreign body extraction  ;  Surgeon: Herberth Castrejon MD;  [facility]     ESOPHAGOSCOPY, GASTROSCOPY, DUODENOSCOPY (EGD), COMBINED N/A 11/26/2017    Procedure: COMBINED ESOPHAGOSCOPY, GASTROSCOPY, DUODENOSCOPY (EGD), REMOVE FOREIGN BODY;;  Surgeon: Hebrerth Castrejon MD;  [facility]     ESOPHAGOSCOPY, GASTROSCOPY, DUODENOSCOPY (EGD), COMBINED N/A 4/10/2020    Procedure: ESOPHAGOGASTRODUODENOSCOPY (EGD);  Surgeon: Yael Cabral MD;  [facility]     FASCIOTOMY LOWER EXTREMITY Left 6/10/2016    Procedure: FASCIOTOMY LOWER EXTREMITY;  Surgeon: Mello Rodriguez MD;  [facility]      CAPSULE ENDOSCOPY N/A 8/25/2014    Procedure: CAPSULE/PILL CAM ENDOSCOPY;  Surgeon: Loc  "Remy Reyna MD;  Location:  GI     HC CAPSULE ENDOSCOPY N/A 10/2/2014    Procedure: CAPSULE/PILL CAM ENDOSCOPY;  Surgeon: Remy Haskins MD;  Location:  GI     ORTHOPEDIC SURGERY      broken wrist repair     REVISE CATHETER INTRATHECAL  08/24/2020     SEX TRANSFORMATION SURGERY, MALE TO FEMALE  1974 1974     SINUS SURGERY      cyst removed     TONSILLECTOMY       VASCULAR SURGERY  2006    Left carotid stent      Allergies   Allergen Reactions     Bee Venom Anaphylaxis     Penicillins Anaphylaxis     Dilantin [Phenytoin] Other (See Comments)     Generic dilantin only per pt     Iodine Hives     Novocaine [Procaine] Hives     Tositumomab Unknown        Anesthesia Evaluation     . Pt has had prior anesthetic. Type: General    No history of anesthetic complications          ROS/MED HX    ENT/Pulmonary: Comment: [facility] - uses \"Pocket talker\" to enhance hearing.      (+)sleep apnea, other ENT (Wears corrective lenses.  Cannot see out of left eye.  Limited right eye vision with no peripheral vision. Legally blind. )- tobacco use (Quit 15 years ago.  Smoked 1-2 PPD for 30 years.), Past use Moderate Persistent asthma Last exacerbation: > 1year ago,Treatment: Inhaled steroids and Nebulizer daily,  COPD, uses CPAP , . .    Neurologic:     (+)neuropathy - fingers, seizures last seizure:  Petit Mal 4-5  time per day.  Grand Mal>15 years ago. CVA (Cerebral vascular dz,s/p stent to left carotid artery 2005.) date: 2005, 2007 & 2008 with deficits- Presented with left side weakness and vision loss.    ,     Cardiovascular: Comment: PVD and thrombosis of LLE, s/p left AKA 6/6/2016   PVD RLE, s/p right AKA 11/23/2016.        (+) hypertension--CAD, -past MI (Inferior MI 9/2016.),-stent (s/p COLLEEN to pRCA and mRCA),9/2016   2 Drug Eluting Stent .. Taking blood thinners Pt has received instructions: . CHF (Plavix started September 2016 post stent placement.   ASA daily.) etiology: diastolic heart failure Last EF: 55-60% " "date: 9/2016 . . :. . Previous cardiac testing Echodate:results:date: results:ECG reviewed date: results:Cath date: results:          METS/Exercise Tolerance: Comment: Patient lives in assisted living.  Is able to dress herself, but does need help with showering. 1 - Eating, dressing   Hematologic:     (+) History of blood clots (Left LE  prior to AKA.) pt is not anticoagulated, Anemia, History of Transfusion previous transfusion reaction - \"hives\" 2012, -      Musculoskeletal: Comment: DDD  Chronic low back pain  (+) arthritis (hands),  -       GI/Hepatic: Comment: Chronic dysphagia   Esophageal strictures and previous dilations.  Coming for food impaction    (+) GERD Asymptomatic on medication,      Acute appendicitis: s/p appendectomy. Cholecystitis/cholelithiasis: s/p cholecystectomy.   Renal/Genitourinary:     (+) Other Renal/ Genitourinary, Frequent UTI's      Endo:     (+) type II DM Not using insulin - not using insulin pump Normal glucose range: 's not previously admitted for DM/DKA Obesity, .      Psychiatric:     (+) psychiatric history anxiety, depression and other (comment) (Schizoaffective disorder)      Infectious Disease:  - neg infectious disease ROS       Malignancy:      - no malignancy   Other: Comment: Chronic pain from neuropathy and PVD  Intrathecal pump with bupivacaine and fentanyl located in right lower back.   (+) No chance of pregnancy C-spine cleared: N/A, H/O Chronic Pain,H/O chronic opiod use , other significant disability Wheelchair bound                       PHYSICAL EXAM:   Mental Status/Neuro: A/A/O   Airway: Facies: Feasible  Mallampati: I  Mouth/Opening: Full  TM distance: > 6 cm  Neck ROM: Full   Respiratory: Auscultation: CTAB     Resp. Rate: Normal     Resp. Effort: Normal      CV: Rhythm: Regular  Rate: Age appropriate  Heart: Normal Sounds  Edema: None   Comments:      Dental: Normal Dentition                Assessment:   ASA SCORE: 3    H&P: History and physical " reviewed and following examination; no interval change.   Smoking Status:  Non-Smoker/Unknown   NPO Status: NPO Appropriate     Plan:   Anes. Type:  MAC   Pre-Medication: None   Induction:  IV (Standard)   Airway: Native Airway   Access/Monitoring: PIV   Maintenance: Propofol Sedation     Postop Plan:   Postop Pain: None  Postop Sedation/Airway: Not planned  Disposition: Outpatient     PONV Management:   Adult Risk Factors: Female, Non-Smoker   Prevention: Ondansetron, Propofol     CONSENT: Direct conversation   Plan and risks discussed with: Patient   Blood Products: Consent Deferred (Minimal Blood Loss)                       Navneet Solis MD

## 2020-10-08 NOTE — OP NOTE
Urology Operative Summary     Pre-operative diagnosis: urinary urge incontinence  Inability to receive anticholinergics or myrbetriq      Post-operative diagnosis: Urinary urge incontinence  Inability to receive anticholinergics or myrbetriq      Procedure: Cystourethroscopy with injection of botulinum toxin A-300 units, suprapubic catheter exchange      Surgeon: Loki Gordon MD   Assistant(s): Christiano Lacy MD      Anesthesia: MAC         Estimated blood loss: Less than 10 ml      Complications: None      Condition: Patient taken to recovery in stable condition.      Indications: Sonya Foote is a 70 year old female with functional incontinence due to bilateral AKAs and overactive bladder. She manages her bladder with a suprapubic tube. She has medical contraindications to both anticholinergics and myrbetriq. She understands the risks and benefits of the various options and elects to proceed with botulinum toxin injection understanding the risks for urinary obstruction, infection, pain, bleeding, need for future procedures and risks of anesthesia.     Procedure: Sonya Foote was taken to the operating room in her usual state of health. She was positioned in Patient Positioning: Lithotomy. Her genitalia were prepped and draped in the standard fashion. A time-out was performed to ensure proper patient, procedure and positioning. The patient received appropriate IV antibiotics prior to the procedure based on pre-operative urine culture.     The procedure was initiated with insertion of a rigid Almendarez ystoscope via urethra. The bladder mucosa appeared to be normal without stones, tumors or diverticulum on 360 degree inspection. Cysto findings included: normal mucosa     We mixed 3 vials of 100 U botulinum toxin A into 20 cc's of injectable saline for a total of 300 U. Bladder injection: We performed a template injection procedure into the bladder wall with a total of 20 injection sites. We then emptied the bladder to  re-inspect our injection sites and found that there was a minimal amount of blood. Her old SP tube was changed for a new 14Fr SP tube. The patient was returned to supine position and transported to recovery in stable condition.     Plan: Schedule another Botox injection for 6 months.    Christiano Lacy MD  Urology Resident    As the attending surgeon I, Loki Gordon, was present and scrubbed throughout the procedure.

## 2020-10-08 NOTE — ANESTHESIA POSTPROCEDURE EVALUATION
Anesthesia POST Procedure Evaluation    Patient: Sonya Foote   MRN:     [id number] Gender:   female   Age:    70 year old :      1950        Preoperative Diagnosis: Neurogenic bladder [N31.9]   Procedure(s):  CYSTOSCOPY, INJECT BOTOX INTO BLADDER, SUPRAPUBIC TUBE EXCHNAGE   Postop Comments: No value filed.     Anesthesia Type: MAC       Disposition: Outpatient   Postop Pain Control: Uneventful            Sign Out: Well controlled pain   PONV: No   Neuro/Psych: Uneventful            Sign Out: Acceptable/Baseline neuro status   Airway/Respiratory: Uneventful            Sign Out: Acceptable/Baseline resp. status   CV/Hemodynamics: Uneventful            Sign Out: Acceptable CV status   Other NRE: NONE   DID A NON-ROUTINE EVENT OCCUR? No         Last Anesthesia Record Vitals:  CRNA VITALS  10/8/2020 1259 - 10/8/2020 1359      10/8/2020             Resp Rate (observed):  (!) 1          Last PACU Vitals:  No vitals data found for the desired time range.        Electronically Signed By: Sintia Blevins MD, 2020, 2:01 PM

## 2020-10-08 NOTE — DISCHARGE INSTRUCTIONS
Emptying and Cleaning Your Urinary Catheter Bag  You have an indwelling urinary catheter. This drains urine from your bladder into a bag. The bag can be one that is used at your bedside. Or it can be a smaller bag that is strapped to your leg. Follow the steps below to empty and clean a urinary bag.       Drain Clean tube Clean catheter   Step 1. Drain the bag    Wash your hands well with soap and water to prevent infecting the urinary catheter and bag.    If the short drainage tube is inserted into a pocket on the bag, take the drainage tube out of the pocket.    Hold the drainage tube over a toilet or measuring container. Open the valve.    Don t touch the tip of the valve or let it touch the toilet or container.    Wash your hands again.  Step 2. Clean the drainage tube    When the bag is empty, clean the tip of the drainage valve with an alcohol wipe.    Close the valve.    Reinsert the drainage tube into the pocket, if there is one.  Step 3. Clean your skin    Wash your hands well before and after cleaning your skin.    If you have a catheter (such as a Landrum) that enters through the urethra, clean the urethral area with soap and water 1 time(s) daily as you were taught by your healthcare provider. You should also clean after every bowel movement to prevent infection.  ? Don't pull on the tubing when cleaning so you don t injure the urethra.  ? Don t apply antibiotic ointment or any other antibacterial product to the urethra.  ? Don t use lubricant on the urethra.  ? Don t apply powder to the genital area or to the tubing.    If you have a suprapubic catheter, your healthcare provider will tell you how to clean your skin around the catheter. This is a catheter that was surgically placed into the bladder through the lower abdomen.  Step 4. Check and clean the catheter tubing    Check the tubing. If there are kinks, cracks, clogs, or you can t see into the tubing, you ll need to change to new tubing as you were  shown by your healthcare provider.    If the current tubing can still be used, wash it with soap and water. Always wash the tubing in the direction away from your body. Don't pull on the tubing.    Dry the tubing with a clean washcloth or paper towel.  Step 5. Clean the drainage bag    Have a clean backup bag or other drainage device ready.    Follow these steps:  ? Wash your hands well with soap and water.  ? Disconnect the bag from the catheter tubing. Connect the tubing to the backup bag or drainage device.  ? Drain any remaining urine from the bag you just disconnected. Close the drainage valve.  ? Pour some warm (not hot) soapy water into the bag. Swish the soap around, being sure to get the corners of the bag.  ? Open the drainage valve to drain the soap. Close the valve.:  ? Use a certain solution to clean the bag if your healthcare provider recommends one. Recommended solutions may include:     2 parts vinegar and 3 parts water    1 tablespoon of chlorine bleach mixed with a half cup of water  ? Ask your healthcare provider how often you should clean your bag and what solution you should use to reduce odor and keep your bag free of germs.  ? Shake the solution a bit and allow it to remain in the bag for 30 minutes.  ? Drain the solution and rinse the bag with cold tap water.  ? Hang the bag to drain and air-dry.  When to call your healthcare provider  Call your healthcare provider right away if you have any of the following:    Little or no urine flowing into the bag    Urine leaking where the catheter enters the body    Pain, burning, or redness of the area where the catheter enters the body    Bloody urine (a trace of blood is normal)    Cloudy or foul-smelling urine, or sand-like grains in your urine    Pain in your lower back or lower abdomen    Your catheter falls out    Fever of 100.4 F (38 C) or higher, or as directed by your healthcare provider    Shaking chills  Date Last Reviewed: 1/1/2017     9916-1614 [employer]. [address] [place] [id number]. All rights reserved. This information is not intended as a substitute for professional medical care. Always follow your healthcare professional's instructions.            [facility], [place]  Same-Day Surgery   Adult Discharge Orders & Instructions     For 24 hours after surgery    1. Get plenty of rest.  A responsible adult must stay with you for at least 24 hours after you leave the hospital.   2. Do not drive or use heavy equipment.  If you have weakness or tingling, don't drive or use heavy equipment until this feeling goes away.  3. Do not drink alcohol.  4. Avoid strenuous or risky activities.  Ask for help when climbing stairs.   5. You may feel lightheaded.  IF so, sit for a few minutes before standing.  Have someone help you get up.   6. If you have nausea (feel sick to your stomach): Drink only clear liquids such as apple juice, ginger ale, broth or 7-Up.  Rest may also help.  Be sure to drink enough fluids.  Move to a regular diet as you feel able.  7. You may have a slight fever. Call the doctor if your fever is over 100 F (37.7 C) (taken under the tongue) or lasts longer than 24 hours.  8. You may have a dry mouth, a sore throat, muscle aches or trouble sleeping.  These should go away after 24 hours.  9. Do not make important or legal decisions.   Call your doctor for any of the followin.  Signs of infection (fever, growing tenderness at the surgery site, a large amount of drainage or bleeding, severe pain, foul-smelling drainage, redness, swelling).    2. It has been over 8 to 10 hours since surgery and you are still not able to urinate (pass water).    3.  Headache for over 24 hours.    4.  Numbness, tingling or weakness the day after surgery (if you had spinal anesthesia).  To contact a doctor, call ____Dr Jauregui's office at [phone] (clinic)_ or:        [phone] and ask for the  resident on call for   ______Urology / Surgery _____ (answered 24 hours a day)      Emergency Department:    [facility]: [phone]       (TTY for hearing impaired: [phone])    [facility]: [phone]       (TTY for hearing impaired: [phone])

## 2020-10-09 ENCOUNTER — TELEPHONE (OUTPATIENT)
Dept: SLEEP MEDICINE | Facility: CLINIC | Age: 70
End: 2020-10-09

## 2020-10-09 ENCOUNTER — MEDICAL CORRESPONDENCE (OUTPATIENT)
Dept: HEALTH INFORMATION MANAGEMENT | Facility: CLINIC | Age: 70
End: 2020-10-09

## 2020-10-09 NOTE — TELEPHONE ENCOUNTER
Reason for Call:  Other appointment    Detailed comments: Patient is calling. Would like to schedule her sleep study. Already had consult. Please give patient a call to schedule. Thank you.    Phone Number Patient can be reached at: Home number on file [phone] (home)    Best Time: Anytime    Can we leave a detailed message on this number? YES    Call taken on 10/9/2020 at 3:52 PM by Iglesia Renae

## 2020-10-13 ENCOUNTER — TRANSFERRED RECORDS (OUTPATIENT)
Dept: HEALTH INFORMATION MANAGEMENT | Facility: CLINIC | Age: 70
End: 2020-10-13

## 2020-10-14 ENCOUNTER — TELEPHONE (OUTPATIENT)
Dept: ANESTHESIOLOGY | Facility: CLINIC | Age: 70
End: 2020-10-14

## 2020-10-14 ENCOUNTER — OFFICE VISIT (OUTPATIENT)
Dept: ANESTHESIOLOGY | Facility: CLINIC | Age: 70
End: 2020-10-14
Payer: COMMERCIAL

## 2020-10-14 VITALS
RESPIRATION RATE: 16 BRPM | HEIGHT: 55 IN | WEIGHT: 120 LBS | HEART RATE: 65 BPM | BODY MASS INDEX: 27.77 KG/M2 | SYSTOLIC BLOOD PRESSURE: 88 MMHG | DIASTOLIC BLOOD PRESSURE: 56 MMHG

## 2020-10-14 DIAGNOSIS — M47.812 ARTHROPATHY OF CERVICAL FACET JOINT: Primary | ICD-10-CM

## 2020-10-14 PROCEDURE — 99213 OFFICE O/P EST LOW 20 MIN: CPT | Mod: GC | Performed by: ANESTHESIOLOGY

## 2020-10-14 ASSESSMENT — MIFFLIN-ST. JEOR: SCORE: 710.95

## 2020-10-14 ASSESSMENT — PAIN SCALES - GENERAL: PAINLEVEL: EXTREME PAIN (8)

## 2020-10-14 NOTE — PROGRESS NOTES
"COMPREHENSIVE PAIN CLINIC FOLLOW UP EVALUATION  10/14/20  VISIT RECOMMENDATIONS:    Interval History:  The patient is a 70 year old female with a complex past medical history who returns to clinic today for continued evaluation of fibromyalgia and chronic headaches and bilateral neck and shoulder pain.  Since her last visit, the patient' reports: her pain is about the same. She saw a chiropractor and had about 10 minutes of relief of her neck pain. Another chiropractor suggested a TENs unit to her. She has not gotten in to see pain physical therapy yet and she did not increase her lyrica.     Previous Interval History on 7/1/20:   \"Sonya Foote is a 70 year old female who first started having problems with pain in the hands, shoulder, and neck on both sides. She also notes headaches . She has history of degenerative disk disease and has an intrathecal pain pump for 5 to 6 years. She states that she has fentanyl and morphine in the pump.  She has had the shoulder and arm pain since before the pump was implanted.  Both sides are equally painful. She states that any light touch is painful. She also notes a headache that has been present for the same duration. The headaches are in the back of the head, equal on both sides and feels like pressure. All of this pain was present prior to IT Pump implantation. She wakes up with the pain in her upper extremities and neck and the pain remains the same throughout the day. She feels the pain in all of her fingers. The fingers feel like \"has a vice on\" with pressure and tightness. The arms feel like sharp and dull with pain. Laying down with a pillow helps.      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 Amputation was 4 years ago.\"    Previous recommendations from visit on 7/1/20 include:     1. Physical Therapy: Referral to Orthology for Pain PT  2. Pain Psychologist to address issues of relaxation, behavioral change, coping style, and other factors " important to improvement: patient not interested at this time  3. Diagnostic Studies: Cervical MRI  4. Medication Management:               Recommend she discuss with her other pain clinic about increasing her Lyrica      dose to help with fibromyalgia  5. Further procedures recommended: none   6. Recommendations/follow-up for PCP:  Recommend she discuss with her other pain clinic about increasing her Lyrica dose to help with fibromyalgia  7. Follow up: 3 to 6 months or as needed    Current Treatments:  Pregabalin 150 mg TID  IT Pump with fentanyl and Morphine (managed by [facility])  Chiropractic therapy    Allergies reviewed:   Allergies   Allergen Reactions     Bee Venom Anaphylaxis     Penicillins Anaphylaxis     Dilantin [Phenytoin] Other (See Comments)     Generic dilantin only per pt     Iodine Hives     Novocaine [Procaine] Hives     Tositumomab Unknown       Medications reviewed: Pertinent medications reviewed and updated  Current Outpatient Medications   Medication     ADVAIR DISKUS 250-50 MCG/DOSE inhaler     albuterol (PROAIR HFA/PROVENTIL HFA/VENTOLIN HFA) 108 (90 Base) MCG/ACT inhaler     albuterol (PROVENTIL) (2.5 MG/3ML) 0.083% neb solution     alendronate (FOSAMAX) 70 MG tablet     ASPERCREME LIDOCAINE 4 % Patch     ASPIRIN LOW DOSE 81 MG chewable tablet     atorvastatin (LIPITOR) 40 MG tablet     blood glucose monitoring (ONE TOUCH ULTRA 2) meter device kit     blood glucose monitoring (ONE TOUCH ULTRA) test strip     blood glucose monitoring (ONE TOUCH ULTRASOFT) lancets     brimonidine (ALPHAGAN) 0.2 % ophthalmic solution     capsaicin-menthol-methyl sal 0.025-1-12 % external cream     diclofenac (VOLTAREN) 1 % topical gel     dorzolamide (TRUSOPT) 2 % ophthalmic solution     escitalopram (LEXAPRO) 10 MG tablet     FEROSUL 325 (65 Fe) MG tablet     fluticasone (FLONASE) 50 MCG/ACT nasal spray     INCRUSE ELLIPTA 62.5 MCG/INH Inhaler     lactulose (CHRONULAC) 10 GM/15ML solution     latanoprost  (XALATAN) 0.005 % ophthalmic solution     levETIRAcetam (KEPPRA) 500 MG tablet     lisinopril (ZESTRIL) 20 MG tablet     loratadine (CLARITIN) 10 MG tablet     Menthol, Topical Analgesic, 5 % GEL     metFORMIN (GLUCOPHAGE) 500 MG tablet     metoprolol succinate ER (TOPROL-XL) 50 MG 24 hr tablet     Multiple Vitamins-Minerals (TAB-A-MACY) TABS     multivitamin, therapeutic (THERA-VIT) TABS tablet     nicotine (COMMIT) 2 MG lozenge     nicotine (NICODERM CQ) 14 MG/24HR 24 hr patch     nitroGLYcerin (NITROSTAT) 0.4 MG sublingual tablet     Nutritional Supplements (ENSURE NUTRITION SHAKE) LIQD     pantoprazole (PROTONIX) 40 MG EC tablet     phenytoin (DILANTIN) 100 MG capsule     phenytoin sodium extended (DILANTIN) 200 MG capsule     polyethylene glycol (MIRALAX) 17 GM/SCOOP powder     pregabalin (LYRICA) 150 MG capsule     risperiDONE (RISPERDAL) 0.5 MG tablet     SENEXON-S 8.6-50 MG tablet     sennosides (SENOKOT) 8.6 MG tablet     solifenacin (VESICARE) 10 MG tablet     traZODone (DESYREL) 150 MG tablet     vitamin D3 (CHOLECALCIFEROL) 10 MCG (400 UNIT) capsule     Vitamin D3 (VITAMIN D) 10 MCG (400 UNIT) tablet     No current facility-administered medications for this visit.        Medical history reviewed:   Patient Active Problem List   Diagnosis     Chronic pain syndrome     Morbid obesity (H)     History of blood transfusion     History of central retinal artery occlusion     COPD (chronic obstructive pulmonary disease) (H)     Type 2 diabetes mellitus (H)     Benign essential hypertension     History of stroke     Sickle cell trait (H)     MDD (major depressive disorder)     Seizure disorder (H)     Chronic back pain     Chronic neck pain     ROGER (generalized anxiety disorder)     Person who has had sex change operation     Osteoporosis     PAD (peripheral artery disease) (H)     Peripheral neuropathy     JOSE (obstructive sleep apnea)     Amputation above knee (H)     Blind left eye     Neurogenic bladder      "Acid reflux disease     Hx of carotid artery stenosis     Complex sleep apnea syndrome     Dysphagia     CAD (coronary artery disease)     Chest pain on breathing     Chronic insomnia       Review of Systems:  The 14 system ROS was reviewed from the intake questionnaire; results listed at end of note.    Physical Exam:  BP (!) 88/56   Pulse 65   Resp 16   Ht 1.092 m (3' 7\")   Wt 54.4 kg (120 lb)   BMI 45.63 kg/m      Physical Exam   Constitutional: Patient is oriented to person, place, and time. Patient is covered head to toe including dark sunglasses. Bilateral AKA amputations noted. No distress.   Neck: Limited range of motion.    Cardiovascular: Normal rate and regular rhythm. Hands warm and well perfused  Pulmonary/Chest: Effort normal. No respiratory distress.   Abdominal: deferred  Genitourinary: deferred  Neurological: She is alert and oriented to person, place, and time. Upper extremity strength equal 5/5  Skin: Skin is warm and dry. She is not diaphoretic.   Psychiatric: She has a normal mood and affect. Her behavior is normal. Judgment and thought content normal.    Imagin20 Cervical Spine MRI  Findings:  The cervical vertebrae are normally aligned.  Exaggerated cervical  lordosis. Multilevel disc height narrowing and disc desiccation.  No  abnormal cord signal.  The findings on a level by level basis are as  follows:     C2-3:  Tiny central protrusion. No significant spinal canal or neural  foraminal stenosis.     C3-4:  Disc osteophyte complex and uncovertebral spurring. Bilateral  facet hypertrophy. Mild spinal canal and mild bilateral neural  foraminal stenosis.     C4-5:  Right eccentric uncovertebral spurring and facet hypertrophy.  Tiny central protrusion. Mild right neural foraminal stenosis. No  spinal canal or left neural foraminal stenosis.     C5-6:  Right eccentric disc osteophyte complex and uncovertebral  spurring. Bilateral facet hypertrophy. Mild to moderate bilateral  neural " foraminal stenosis. Mild spinal stenosis.     C6-7:  Disc osteophyte complex and uncovertebral spurring. Bilateral  facet hypertrophy. Moderate bilateral neural foraminal stenosis. No  spinal canal stenosis.     C7-T1:  No spinal canal or neural foraminal stenosis.     No abnormality of the paraspinous soft tissues.                                                                   Impression:   Multilevel cervical spondylosis mild spinal canal stenosis at C3-4 and  C5-6. Mild to moderate bilateral neural foraminal stenosis C5-6 an  moderate bilateral neural foraminal stenosis at C6-7.    Laboratory Results:  Reviewed    Assessment:  The patient is a 70 year old female with a complex past medical history who returns to clinic today for continued evaluation of fibromyalgia and chronic headaches and bilateral neck and shoulder pain. Her history, physical exam imaging supports the diagnosis of cervical facet arthropathy and cervicogenic headaches. She endorses worse pain on tdhe left so we will schedule a diagnostic left cervical medial branch blocks. She is also interested in trialing a TENs unit so this will be ordered.     Visit Diagnoses:  1. Fibromyalgia  2. Chronic Pain Syndrome  3. Chronic Tension Type Headaches  4. Cervical Facet Arthropathy  5. Cervicogenic headaches    Plan:  Work up:    - None at this time    Referrals:    - None at this time    Medications:    - Continue current therapy    Therapies:    - Pain PT     - TENs Unit    Interventions:    - Schedule left cervical MBBs    Follow up: 6 weeks after procedure    This patient was seen and discussed with Dr. Lima.    PABLO Camp  Anesthesia Resident CA-2      I saw and examined the patient with the Pain Fellow/Resident. I have reviewed and agree with the resident's note and plan of care and made changes and corrections directly to the body of the note.    TIME SPENT:  BY FELLOW/RESIDENT ALONE 10 MIN  BY MYSELF AND FELLOW/RESIDENT  TOGETHER 10 MIN    These times included  More than 50% I spent counseling her about her diagnosis and treatment options and coordination of care with the primary team    Evelyn Lima MD  Pain Medicine, Department of Anesthesiology  , [facility]

## 2020-10-14 NOTE — TELEPHONE ENCOUNTER
----- Message from Allan Casey MD sent at 10/14/2020  8:56 AM CDT -----  Regarding: RE: Holding Aspirin  Mary    Assuming that the injections are needed to help control her pain I do not see any concern of holding her aspirin accordingly.    Dr. Casey  ----- Message -----  From: Mary Singer LPN  Sent: 10/14/2020   8:28 AM CDT  To: Allan Casey MD, Evelyn Lima MD, #  Subject: Holding Aspirin                                  Armin,  My name is Becka, I am one of the Nurses working with the [facility]. Our mutual patient, Sonya Foote, had an appointment with one of our providers, Dr. Evelyn Lima, and would like to order the following procedure: left sided cervical medial branch blocks. These are diagnostic and can be up to 3 injections in total.     This would require the patient to hold their Aspirin 81 mg for 6 days, before each injection.     Is this something that would be agreeable to you?      Please reach out to our staff if you have any questions or concerns.   Thank you for your time.     JOSE LUIS Hernandez  [facility]  [phone]

## 2020-10-14 NOTE — LETTER
"10/14/2020       RE: Sonya Foote  [address]  [place] [address]     Dear Colleague,    Thank you for referring your patient, Sonya Foote, to the [facility]IN[facility] FOR COMPREHENSIVE PAIN MANAGEMENT [place] at [facility]. Please see a copy of my visit note below.    COMPREHENSIVE PAIN CLINIC FOLLOW UP EVALUATION  10/14/20  VISIT RECOMMENDATIONS:    Interval History:  The patient is a 70 year old female with a complex past medical history who returns to clinic today for continued evaluation of fibromyalgia and chronic headaches and bilateral neck and shoulder pain.  Since her last visit, the patient' reports: her pain is about the same. She saw a chiropractor and had about 10 minutes of relief of her neck pain. Another chiropractor suggested a TENs unit to her. She has not gotten in to see pain physical therapy yet and she did not increase her lyrica.     Previous Interval History on 7/1/20:   \"Sonya Foote is a 70 year old female who first started having problems with pain in the hands, shoulder, and neck on both sides. She also notes headaches . She has history of degenerative disk disease and has an intrathecal pain pump for 5 to 6 years. She states that she has fentanyl and morphine in the pump.  She has had the shoulder and arm pain since before the pump was implanted.  Both sides are equally painful. She states that any light touch is painful. She also notes a headache that has been present for the same duration. The headaches are in the back of the head, equal on both sides and feels like pressure. All of this pain was present prior to IT Pump implantation. She wakes up with the pain in her upper extremities and neck and the pain remains the same throughout the day. She feels the pain in all of her fingers. The fingers feel like \"has a vice on\" with pressure and tightness. The arms feel like sharp and dull with pain. Laying down with a pillow helps. " "     She has bilateral lower extremity above the knee due to blood clots. Amputation was 4 years ago.\"    Previous recommendations from visit on 7/1/20 include:     1. Physical Therapy: [facility]  2. Pain Psychologist to address issues of relaxation, behavioral change, coping style, and other factors important to improvement: patient not interested at this time  3. Diagnostic Studies: Cervical MRI  4. Medication Management:               Recommend she discuss with her other pain clinic about increasing her Lyrica      dose to help with fibromyalgia  5. Further procedures recommended: none   6. Recommendations/follow-up for PCP:  Recommend she discuss with her other pain clinic about increasing her Lyrica dose to help with fibromyalgia  7. Follow up: 3 to 6 months or as needed    Current Treatments:  Pregabalin 150 mg TID  IT Pump with fentanyl and Morphine (managed by [facility])  Chiropractic therapy    Allergies reviewed:   Allergies   Allergen Reactions     Bee Venom Anaphylaxis     Penicillins Anaphylaxis     Dilantin [Phenytoin] Other (See Comments)     Generic dilantin only per pt     Iodine Hives     Novocaine [Procaine] Hives     Tositumomab Unknown       Medications reviewed: Pertinent medications reviewed and updated  Current Outpatient Medications   Medication     ADVAIR DISKUS 250-50 MCG/DOSE inhaler     albuterol (PROAIR HFA/PROVENTIL HFA/VENTOLIN HFA) 108 (90 Base) MCG/ACT inhaler     albuterol (PROVENTIL) (2.5 MG/3ML) 0.083% neb solution     alendronate (FOSAMAX) 70 MG tablet     ASPERCREME LIDOCAINE 4 % Patch     ASPIRIN LOW DOSE 81 MG chewable tablet     atorvastatin (LIPITOR) 40 MG tablet     blood glucose monitoring (ONE TOUCH ULTRA 2) meter device kit     blood glucose monitoring (ONE TOUCH ULTRA) test strip     blood glucose monitoring (ONE TOUCH ULTRASOFT) lancets     brimonidine (ALPHAGAN) 0.2 % ophthalmic solution     capsaicin-menthol-methyl sal 0.025-1-12 % external cream "     diclofenac (VOLTAREN) 1 % topical gel     dorzolamide (TRUSOPT) 2 % ophthalmic solution     escitalopram (LEXAPRO) 10 MG tablet     FEROSUL 325 (65 Fe) MG tablet     fluticasone (FLONASE) 50 MCG/ACT nasal spray     INCRUSE ELLIPTA 62.5 MCG/INH Inhaler     lactulose (CHRONULAC) 10 GM/15ML solution     latanoprost (XALATAN) 0.005 % ophthalmic solution     levETIRAcetam (KEPPRA) 500 MG tablet     lisinopril (ZESTRIL) 20 MG tablet     loratadine (CLARITIN) 10 MG tablet     Menthol, Topical Analgesic, 5 % GEL     metFORMIN (GLUCOPHAGE) 500 MG tablet     metoprolol succinate ER (TOPROL-XL) 50 MG 24 hr tablet     Multiple Vitamins-Minerals (TAB-A-MACY) TABS     multivitamin, therapeutic (THERA-VIT) TABS tablet     nicotine (COMMIT) 2 MG lozenge     nicotine (NICODERM CQ) 14 MG/24HR 24 hr patch     nitroGLYcerin (NITROSTAT) 0.4 MG sublingual tablet     Nutritional Supplements (ENSURE NUTRITION SHAKE) LIQD     pantoprazole (PROTONIX) 40 MG EC tablet     phenytoin (DILANTIN) 100 MG capsule     phenytoin sodium extended (DILANTIN) 200 MG capsule     polyethylene glycol (MIRALAX) 17 GM/SCOOP powder     pregabalin (LYRICA) 150 MG capsule     risperiDONE (RISPERDAL) 0.5 MG tablet     SENEXON-S 8.6-50 MG tablet     sennosides (SENOKOT) 8.6 MG tablet     solifenacin (VESICARE) 10 MG tablet     traZODone (DESYREL) 150 MG tablet     vitamin D3 (CHOLECALCIFEROL) 10 MCG (400 UNIT) capsule     Vitamin D3 (VITAMIN D) 10 MCG (400 UNIT) tablet     No current facility-administered medications for this visit.        Medical history reviewed:   Patient Active Problem List   Diagnosis     Chronic pain syndrome     Morbid obesity (H)     History of blood transfusion     History of central retinal artery occlusion     COPD (chronic obstructive pulmonary disease) (H)     Type 2 diabetes mellitus (H)     Benign essential hypertension     History of stroke     Sickle cell trait (H)     MDD (major depressive disorder)     Seizure disorder (H)  "    Chronic back pain     Chronic neck pain     ROGER (generalized anxiety disorder)     Person who has had sex change operation     Osteoporosis     PAD (peripheral artery disease) (H)     Peripheral neuropathy     JOSE (obstructive sleep apnea)     Amputation above knee (H)     Blind left eye     Neurogenic bladder     Acid reflux disease     Hx of carotid artery stenosis     Complex sleep apnea syndrome     Dysphagia     CAD (coronary artery disease)     Chest pain on breathing     Chronic insomnia       Review of Systems:  The 14 system ROS was reviewed from the intake questionnaire; results listed at end of note.    Physical Exam:  BP (!) 88/56   Pulse 65   Resp 16   Ht 1.092 m (3' 7\")   Wt 54.4 kg (120 lb)   BMI 45.63 kg/m      Physical Exam   Constitutional: Patient is oriented to person, place, and time. Patient is covered head to toe including dark sunglasses. Bilateral AKA amputations noted. No distress.   Neck: Limited range of motion.    Cardiovascular: Normal rate and regular rhythm. Hands warm and well perfused  Pulmonary/Chest: Effort normal. No respiratory distress.   Abdominal: deferred  Genitourinary: deferred  Neurological: She is alert and oriented to person, place, and time. Upper extremity strength equal 5/5  Skin: Skin is warm and dry. She is not diaphoretic.   Psychiatric: She has a normal mood and affect. Her behavior is normal. Judgment and thought content normal.    Imagin20 Cervical Spine MRI  Findings:  The cervical vertebrae are normally aligned.  Exaggerated cervical  lordosis. Multilevel disc height narrowing and disc desiccation.  No  abnormal cord signal.  The findings on a level by level basis are as  follows:     C2-3:  Tiny central protrusion. No significant spinal canal or neural  foraminal stenosis.     C3-4:  Disc osteophyte complex and uncovertebral spurring. Bilateral  facet hypertrophy. Mild spinal canal and mild bilateral neural  foraminal stenosis.     C4-5:  " Right eccentric uncovertebral spurring and facet hypertrophy.  Tiny central protrusion. Mild right neural foraminal stenosis. No  spinal canal or left neural foraminal stenosis.     C5-6:  Right eccentric disc osteophyte complex and uncovertebral  spurring. Bilateral facet hypertrophy. Mild to moderate bilateral  neural foraminal stenosis. Mild spinal stenosis.     C6-7:  Disc osteophyte complex and uncovertebral spurring. Bilateral  facet hypertrophy. Moderate bilateral neural foraminal stenosis. No  spinal canal stenosis.     C7-T1:  No spinal canal or neural foraminal stenosis.     No abnormality of the paraspinous soft tissues.                                                                   Impression:   Multilevel cervical spondylosis mild spinal canal stenosis at C3-4 and  C5-6. Mild to moderate bilateral neural foraminal stenosis C5-6 an  moderate bilateral neural foraminal stenosis at C6-7.    Laboratory Results:  Reviewed    Assessment:  The patient is a 70 year old female with a complex past medical history who returns to clinic today for continued evaluation of fibromyalgia and chronic headaches and bilateral neck and shoulder pain. Her history, physical exam imaging supports the diagnosis of cervical facet arthropathy and cervicogenic headaches. She endorses worse pain on tdhe left so we will schedule a diagnostic left cervical medial branch blocks. She is also interested in trialing a TENs unit so this will be ordered.     Visit Diagnoses:  1. Fibromyalgia  2. Chronic Pain Syndrome  3. Chronic Tension Type Headaches  4. Cervical Facet Arthropathy  5. Cervicogenic headaches    Plan:  Work up:    - None at this time    Referrals:    - None at this time    Medications:    - Continue current therapy    Therapies:    - Pain PT     - TENs Unit    Interventions:    - Schedule left cervical MBBs    Follow up: 6 weeks after procedure    This patient was seen and discussed with Dr. Lima.    Ines Goins  PABLO Lizarraga  Anesthesia Resident CA-2      I saw and examined the patient with the Pain Fellow/Resident. I have reviewed and agree with the resident's note and plan of care and made changes and corrections directly to the body of the note.    TIME SPENT:  BY FELLOW/RESIDENT ALONE 10 MIN  BY MYSELF AND FELLOW/RESIDENT TOGETHER 10 MIN    These times included  More than 50% I spent counseling her about her diagnosis and treatment options and coordination of care with the primary team    Evelyn Lima MD  Pain Medicine, Department of Anesthesiology  , [facility]            LPN reviewed AVS and pre-procedure instructions with the pt.   Pt was informed that they should call to schedule their injection.   LPN sent a message to the pt's PCP asking for permission for pt to hold their ASA 81 mg for 6 days before each procedure.     Mary Singer LPN

## 2020-10-14 NOTE — PROGRESS NOTES
LPN reviewed AVS and pre-procedure instructions with the pt.   Pt was informed that they should call to schedule their injection.   LPN sent a message to the pt's PCP asking for permission for pt to hold their ASA 81 mg for 6 days before each procedure.     Mary Singer LPN

## 2020-10-14 NOTE — PATIENT INSTRUCTIONS
Procedures:    Call to schedule your procedure: [phone], option #2    Left sided Cervical Medial Branch Blocks.     Your pre-procedure instructions are below, please call our clinic if you have any questions.    Treatment planning:    TENS UNIT Ordered- Please call your insurance to Verify coverage and locations to  the device.       Recommended Follow up:      As indicated following the injection.        Please call [phone], option #1 to schedule your clinic appointment if you don't already have an appointment scheduled.    Procedure Information related to COVID-19    Please call [phone] option #2 to schedule, reschedule, or cancel your procedure appointment.   Phones are answered Monday - Friday from 08:00 - 4:30pm.  Leave a voicemail with your name, birth date, and phone number if no one is available to take your call.     You will need to be tested for COVID-19 within 4 days (96 hours) of your procedure.  You will be called to schedule your COVID test by a central scheduling team. If you have not been contacted to schedule your test within 4 days of the scheduled procedure, call [phone]    Please be aware that the turn around time for the test is approximately 24-72 hours.   If your results are still pending the day of your procedure, you will be notified as soon as possible as the procedure may be cancelled.    Please note: You will only be contacted for positive and pending results.       You may bring one visitor with you on the day of your procedure.  The procedure center staff will call you several days before the procedure to review important information that you will need to know for the day of the procedure.   Please contact the clinic if you have further questions about this information.       Information related to Scheduling and Pre-Procedure Instructions:    Please Hold Aspirin 81 mg for 6 days before your procedure.  -Please follow up with your Primary Care provider to be  sure that they are agreeable to this.     If you are having a Diagnostic procedure, you will be given a Pain Diary to document your pain relief.   Please fax the completed form to our office.   fax number is [phone]    If you must reschedule your procedure more than two times, you must follow up in clinic before rescheduling again.    Preparing for your procedure    CAUTION - FAILURE TO FOLLOW THESE PRE-PROCEDURE INSTRUCTIONS WILL RESULT IN YOUR PROCEDURE BEING RESCHEDULED.      Your procedure: Left sided cervical facet joint injections.             You must have a  take you home after your procedure. Transportation by taxi or para-transit is okay as long as you have a responsible adult accompany you. You must provide your 's full name and contact number at time of check in.     Fasting Protocol Please have nothing to eat and drink for 2 hours before the procedure.    Medications If you take any medications, DO NOT STOP. Take your medications as usual the day of your procedure with a sip of water AT LEAST 2 HOURS PRIOR TO ARRIVAL.    Antibiotics If you are currently taking antibiotics, you must complete the entire dose 7 days prior to your scheduled procedure. You must be clear of any signs or symptoms of infection. If you begin antibiotics, please contact our clinic for instructions.     Fever, Chills, or Rash If you experience a fever of higher than 100 degrees, chills, rash, or open wounds during the one week before your procedure, please call the clinic to see if you may proceed with your procedure.      Medication Hold List  **Patients under Cardiology/Neurology care should consult their provider prior to the pain procedure to verify pre-procedure medication instructions. The information below contains general guidelines.**    Please Hold Aspirin 81 mg for 6 days before your procedure.  -Please follow up with your Primary Care provider to be sure that they are agreeable to this.     Blood  Thinners If you are taking daily ASPIRIN, PLAVIX, OR OTHER BLOOD THINNERS SUCH AS COUMADIN/WARFARIN, we will need your prescribing doctor to sign a release permitting you to stop these medications. Once approved by your prescribing doctor - STOP ALL BLOOD THINNERS BASED ON THE TIME TABLE BELOW PRIOR TO YOUR PROCEDURE. If you have been instructed to stop WARFARIN(COUMADIN), you must have an INR lab drawn the day before your procedure. . Your INR must be within normal limits before we can perform your injection. MEDICATIONS CAN BE RESTARTED AFTER YOUR PROCEDURE.    14 DAY HOLD  Ticlid (ticlopidine)    10 DAY HOLD  Effient (Prasugel)    3 DAY HOLD  Xarelto (rivaroxaban) 7 DAY HOLD  Anacin, Bufferin, Ecotrin, Excedrin, Aggrenox (Aspirin)  Brilinta (ticagrelor)  Coumadin (Warfarin)  Pradexa (Dabigatran)  Elmiron (Pentosan)  Plavix (Clopidogrel Bisulfate)  Pletal (Cilostazol)    24 HOUR HOLD  Lovenox (enoxaparin)  Agrylin (Anagrelide)        Non-steroidal Anti-inflammatories (NSAIDs) DO NOT TAKE any non-steroidal anti-inflammatory medications (NSAIDs) listed on the table below. MEDICATIONS CAN BE RESTARTED AFTER YOUR PROCEDURE. Celebrex is OK to take and does not need to be discontinued.     Medications to stop:  3 DAY HOLD  Advil, Motrin (Ibuprofen)  Arthrotec (diciofenac sodium/misoprostol)  Clinoril (Sulindac)  Indocin (Indomethacin)  Lodine (Etodolac)  Toradol (Ketorolac)  Vicoprofen (Hydrocodone and Ibuprofen)  Voltaren (Diclotenac)    14 DAY HOLD  Daypro (Oxaprozin)  Feldene (Piroxicam)   7 DAY HOLD  Aleve (Naproxen sodium)  Darvon compound (contains aspirin)  Naprosyn (Naproxen)  Norgesic Forte (contains aspirin)  Mobic (Meloxicam)  Oruvall (Ketoprofen)  Percodan (contains aspirin)  Relafen (Nabumetone)  Salsalate  Trilisate  Vitamin E (more than 400 mg per day)  Any medication containing aspirin                To speak with a nurse, schedule/reschedule/cancel a clinic appointment, or request a medication refill  call: [phone]     You can also reach us by MyChart: [email or web address]

## 2020-10-15 ENCOUNTER — TELEPHONE (OUTPATIENT)
Dept: ANESTHESIOLOGY | Facility: CLINIC | Age: 70
End: 2020-10-15

## 2020-10-15 NOTE — TELEPHONE ENCOUNTER
[facility] Call Center    Phone Message    May a detailed message be left on voicemail: yes     Reason for Call: Appointment Intake    Referring Provider Name: Dr. Lima  Diagnosis and/or Symptoms: Patient said she's called the procedure scheduling line multiple times and left voice mails but have not gotten a call back yet. Please call patient to help schedule. Thank you.     Action Taken: Message routed to:  [facility] ([facility] Pain    Travel Screening: Not Applicable

## 2020-10-16 DIAGNOSIS — Z11.59 ENCOUNTER FOR SCREENING FOR OTHER VIRAL DISEASES: Primary | ICD-10-CM

## 2020-10-21 ENCOUNTER — MEDICAL CORRESPONDENCE (OUTPATIENT)
Dept: HEALTH INFORMATION MANAGEMENT | Facility: CLINIC | Age: 70
End: 2020-10-21

## 2020-10-21 ENCOUNTER — OFFICE VISIT (OUTPATIENT)
Dept: INTERNAL MEDICINE | Facility: CLINIC | Age: 70
End: 2020-10-21
Payer: COMMERCIAL

## 2020-10-21 VITALS
BODY MASS INDEX: 45.63 KG/M2 | SYSTOLIC BLOOD PRESSURE: 98 MMHG | OXYGEN SATURATION: 98 % | TEMPERATURE: 98.5 F | HEART RATE: 70 BPM | WEIGHT: 120 LBS | DIASTOLIC BLOOD PRESSURE: 58 MMHG | RESPIRATION RATE: 15 BRPM

## 2020-10-21 DIAGNOSIS — G89.29 CHRONIC BACK PAIN, UNSPECIFIED BACK LOCATION, UNSPECIFIED BACK PAIN LATERALITY: ICD-10-CM

## 2020-10-21 DIAGNOSIS — G89.4 CHRONIC PAIN SYNDROME: Chronic | ICD-10-CM

## 2020-10-21 DIAGNOSIS — M54.9 CHRONIC BACK PAIN, UNSPECIFIED BACK LOCATION, UNSPECIFIED BACK PAIN LATERALITY: ICD-10-CM

## 2020-10-21 DIAGNOSIS — Z09 HOSPITAL DISCHARGE FOLLOW-UP: Primary | ICD-10-CM

## 2020-10-21 DIAGNOSIS — M54.2 CHRONIC NECK PAIN: ICD-10-CM

## 2020-10-21 DIAGNOSIS — E11.59 TYPE 2 DIABETES MELLITUS WITH OTHER CIRCULATORY COMPLICATION, WITHOUT LONG-TERM CURRENT USE OF INSULIN (H): ICD-10-CM

## 2020-10-21 DIAGNOSIS — G89.29 CHRONIC NECK PAIN: ICD-10-CM

## 2020-10-21 DIAGNOSIS — N31.9 NEUROGENIC BLADDER: ICD-10-CM

## 2020-10-21 DIAGNOSIS — J44.9 CHRONIC OBSTRUCTIVE PULMONARY DISEASE, UNSPECIFIED COPD TYPE (H): ICD-10-CM

## 2020-10-21 DIAGNOSIS — I10 ESSENTIAL HYPERTENSION: ICD-10-CM

## 2020-10-21 PROCEDURE — 99214 OFFICE O/P EST MOD 30 MIN: CPT | Performed by: INTERNAL MEDICINE

## 2020-10-21 RX ORDER — ALBUTEROL SULFATE 90 UG/1
2 AEROSOL, METERED RESPIRATORY (INHALATION) EVERY 6 HOURS
Qty: 1 INHALER | Refills: 5 | Status: SHIPPED | OUTPATIENT
Start: 2020-10-21 | End: 2020-12-23

## 2020-10-21 NOTE — PROGRESS NOTES
Subjective     Sonya Foote is a 70 year old female who presents to clinic today for the following health issues:    HPI         {SUPERLIST (Optional):892539}    {additonal problems for provider to add (Optional):073105}    Review of Systems   CONSTITUTIONAL: NEGATIVE for fever, chills, change in weight  ENT/MOUTH: NEGATIVE for ear, mouth and throat problems  RESP: NEGATIVE for significant cough or SOB  CV: NEGATIVE for chest pain, palpitations or peripheral edema  GI: NEGATIVE for nausea, abdominal pain, heartburn, or change in bowel habits  : NEGATIVE for frequency, dysuria, or hematuria  NEURO: NEGATIVE for weakness, dizziness or paresthesias  HEME: NEGATIVE for bleeding problems  PSYCHIATRIC: NEGATIVE for changes in mood or affect      Objective    There were no vitals taken for this visit.  There is no height or weight on file to calculate BMI.  Physical Exam   GENERAL: alert and presented again in her motorized wheelchair  EYES: Eyes grossly normal to inspection, PERRL and conjunctivae and sclerae normal  HENT: ear canals and TM's normal, nose and mouth without ulcers or lesions  NECK: no adenopathy, no asymmetry, masses, or scars and thyroid normal to palpation  RESP: lungs clear to auscultation - no rales, rhonchi or wheezes  CV: regular rate and rhythm, normal S1 S2, no S3 or S4, no  click or rub, no peripheral edema and peripheral pulses strong  Pain pump in place abdominal wall  MS: There are bilateral lower extremity amputations noted  NEURO: Normal strength and tone, mentation intact and speech normal  PSYCH: mentation appears normal, affect normal/bright      Lab Results   Component Value Date    A1C 5.0 08/19/2020    A1C 5.0 12/16/2019    A1C 5.0 04/17/2019    A1C 5.1 06/06/2018    A1C 4.4 05/02/2018     LDL Cholesterol Calculated   Date Value Ref Range Status   12/16/2019 95 <100 mg/dL Final     Comment:     Desirable:       <100 mg/dl       Assessment & Plan     {Diag Picklist:188066}     Tobacco  Cessation:   reports that she has been smoking cigarettes and cigars. She has a 30.00 pack-year smoking history. She has never used smokeless tobacco.  Tobacco Cessation Action Plan: Information offered: Patient not interested at this time         See Patient Instructions    No follow-ups on file.    Allan Casey MD  [facility]

## 2020-10-21 NOTE — PROGRESS NOTES
Subjective     Sonya Foote is a 70 year old female who presents to clinic today for the following health issues:    HPI           Hospital Follow-up Visit:    Hospital/Nursing Home/IP Rehab Facility: [facility]  Date of Admission: 10/8/20  Date of Discharge: 10/8/20  Reason(s) for Admission: Neurogenic bladder       Was your hospitalization related to COVID-19? No   Problems taking medications regularly:  None  Medication changes since discharge: None  Problems adhering to non-medication therapy:  None    Summary of hospitalization:  Discharge summary unavailable  Diagnostic Tests/Treatments reviewed.  Follow up needed: noted  Other Healthcare Providers Involved in Patient s Care:         None  Update since discharge: stable.       Post Discharge Medication Reconciliation: discharge medications reconciled, continue medications without change.  Plan of care communicated with patient                Other concerns:  1. Patient requesting orders for BP to be checked more than once a month by assisted living     Review of Systems   CONSTITUTIONAL: NEGATIVE for fever, chills, change in weight  EYES: NEGATIVE for vision changes or irritation  ENT/MOUTH: NEGATIVE for ear, mouth and throat problems  RESP: NEGATIVE for significant cough or SOB  CV: NEGATIVE for chest pain, palpitations or peripheral edema  GI: NEGATIVE for nausea, abdominal pain, heartburn, or change in bowel habits  : NEGATIVE for frequency, dysuria, or hematuria  MUSCULOSKELETAL: NEGATIVE for significant arthralgias or myalgia  NEURO: NEGATIVE for weakness, dizziness or paresthesias  ENDOCRINE: NEGATIVE for temperature intolerance, skin/hair changes  HEME: NEGATIVE for bleeding problems  PSYCHIATRIC: NEGATIVE for changes in mood or affect      Objective    BP 98/58   Pulse 70   Temp 99.5  F (37.5  C) (Temporal)   Resp 15   Wt 54.4 kg (120 lb)   SpO2 98%   BMI 45.63 kg/m    There is no height or weight on file to calculate  BMI.  Physical Exam   GENERAL: alert and no distress  EYES: Eyes grossly normal to inspection, PERRL and conjunctivae and sclerae normal  HENT: ear canals and TM's normal, nose and mouth without ulcers or lesions  NECK: no adenopathy, no asymmetry, masses, or scars and thyroid normal to palpation  RESP: lungs clear to auscultation - no rales, rhonchi or wheezes  CV: regular rate and rhythm, normal S1 S2, no S3 or S4, no click or rub, no peripheral edema and peripheral pulses strong  MS: no gross musculoskeletal defects noted, no edema  PSYCH: mentation appears normal, affect normal/bright      Lab Results   Component Value Date    A1C 5.0 08/19/2020    A1C 5.0 12/16/2019    A1C 5.0 04/17/2019    A1C 5.1 06/06/2018    A1C 4.4 05/02/2018       LDL Cholesterol Calculated   Date Value Ref Range Status   12/16/2019 95 <100 mg/dL Final     Comment:     Desirable:       <100 mg/dl       Assessment & Plan     Hospital discharge follow-up  There is a stable post Botox injection.    Neurogenic bladder  Following up with urology as directed.    Chronic back pain, unspecified back location, unspecified back pain laterality  Continue with ongoing therapy and follow-up in the pain clinic    Chronic neck pain  Potentially scheduled for follow-up injection in the future    Chronic pain syndrome  Unchanged from baseline.    Type 2 diabetes mellitus with other circulatory complication, without long-term current use of insulin (H)  Noted A1c level stable as above    Chronic obstructive pulmonary disease, unspecified COPD type (H)  Patient requests refill which was sent to pharmacy  - albuterol (PROAIR HFA/PROVENTIL HFA/VENTOLIN HFA) 108 (90 Base) MCG/ACT inhaler; Inhale 2 puffs into the lungs every 6 hours    Essential hypertension  Patient requests blood pressure check 2 times daily which I have written an order for        See Patient Instructions    Return for 3 month follow-up clinic visit.    Allan Casey MD  [facility]  [facility]

## 2020-10-22 NOTE — PROGRESS NOTES
"Sonya Foote is a 70 year old female who is being evaluated via a billable telephone visit.      The patient has been notified of following:     \"This telephone visit will be conducted via a call between you and your physician/provider. We have found that certain health care needs can be provided without the need for a physical exam.  This service lets us provide the care you need with a short phone conversation.  If a prescription is necessary we can send it directly to your pharmacy.  If lab work is needed we can place an order for that and you can then stop by our lab to have the test done at a later time.    Telephone visits are billed at different rates depending on your insurance coverage. During this emergency period, for some insurers they may be billed the same as an in-person visit.  Please reach out to your insurance provider with any questions.    If during the course of the call the physician/provider feels a telephone visit is not appropriate, you will not be charged for this service.\"    Patient has given verbal consent for Telephone visit?  Yes    What phone number would you like to be contacted at? [phone]    How would you like to obtain your AVS? Mail a copy    Phone call duration: 40 minutes    Mello Campos PsyD     SLEEP MEDICINE CONSULTATION  Sleep Psychology    Name: Sonya Foote MRN# [id number]   Age: 70 year old YOB: 1950     Date of Consultation: Oct 23, 2020  Consultation is requested by: Gustavo Wright MD  [address]  [place] [address]  Primary care provider: Allan Casey    Reason for Sleep Consultation     Sonya Foote is a 70 year old female seen today for a behavioral sleep medicine consultation because of insomnia.      Assessment and Plan     Sleep Diagnoses/Recommendations:    1. Chronic insomnia  Chronic multifactorial insomnia associated with her multiple health related issues including complex sleep apnea, COPD, diabetes, depression and " fibromyalgia.  Patient may benefit from behavioral sleep training focusing predominantly on inadequate sleep hygiene.  Patient is currently spending nearly 12 hours in bed for at least to having that duration of potential sleep window.  Patient agreed to reduce time in bed to 8.5 hours and establish a sleep schedule between 11 PM-7:30 AM.  She will get out of bed or manage fibromyalgia pain in her recliner rather than retreating to the bed.  She will continue to avoid naps.  1 sleep hygiene factor that may be affecting sleep is her return to smoking cigarettes.  She habitually gets up in the middle of the night to have a cigarette and smokes in the evening.  She has a plan to discontinue smoking and will work with her primary care physician around this.  She will she will also follow-up with her primary care physician around use of trazodone currently taken at bedtime    2. Complex sleep apnea syndrome  Repeat sleep study scheduled for next month to establish a clinical care needs and equipment      See patient instructions for initial treatment recommendations and behavioral sleep plan.    Summary Counseling:      Sonya was provided information about the pathophysiology of insomnia and psychophysiological factors contributing to the onset and maintenance of insomnia .  Treatment option were discussed including component of cognitive-behavioral therapy for insomnia. The benefits and potential early side effects of treatment including increased daytime sleepiness were discussed. She was advised to seek medical advise and consultation around use of or changes to prescription sleep medication, Patient was counseled on the importance of avoiding driving if drowsy.        Follow-up: 4 weeks     History of Present Sleep Complaint     Sonya Foote is a 70 year old year old female who reports 35 year history of insomnia which she believes was triggered by work related stress and demands.    Takes trazodone at night.    Usually  goes to bed at 8 PM but usually does not fall asleep at 11 PM.  Usually wakes up three hours later. Is usually awake in the middle of the night for about 2 hours.  Usually out of bed by 7:30 AM.     Currently lives in an assisted living setting.    Bedroom is quiet and comfortable.      She reports her sleep habits are similar to that of when she was working as a .    Tries to turn mobile phone off at bedtime.  Does not read in bed.    Watches TV, listens to music in bed.    Not currently using CPAP.  Scheduled for repeat sleep study for consideration of BiPAP.    Has history of COPD and breathing related distress.    Had smoked for years but quit 17 years ago.  Restarted smoking three years ago whet back to smoking.    Reports history of depression and feels it is under control with medications.    Reports chronic pain related to fibromyalgia.        Reports chronic pain related to DDD.  This may affect sleep.    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 Did have Restless Legs Syndrome before amputation but not anymore.       Sleep history:   10/26/2012 - Polysomnography at [facility] -  min; AHI 14; RDI 22; ERIN 5/hr; No PLMs or RBD.     11/16/2012 - Titration Polysomnography at [facility] - Started on CPAP and developed treatment-emergent central apneas. Adaptive Servo-Ventilation titration optimal but did well on CPAP 8 as well (at least REM supine per comments).     Put on CPAP 8 cmH2O.     Initially presented to [facility] 2017 on CPAP 8 cmH20 and elevated AHI 9.7 on download. Not a candidate for ASV due to reduced ejection fraction.       Screening          [place] Sleepiness Scale  Total score - [place]: 11 .    EUGENIA Total  Score: 23       PHQ-9 SCORE 10/1/2020   PHQ-9 Total Score -   PHQ-9 Total Score 6       ROGER-7 SCORE 4/14/2016 4/26/2017 3/5/2020   Total Score 6 3 0       Patient Activation Score   No flowsheet data found.      Vitals     There were no vitals taken for this visit.     Medical History     Patient Active Problem List   Diagnosis     Chronic pain syndrome     Morbid obesity (H)     History of blood transfusion     History of central retinal artery occlusion     COPD (chronic obstructive pulmonary disease) (H)     Type 2 diabetes mellitus (H)     Benign essential hypertension     History of stroke     Sickle cell trait (H)     MDD (major depressive disorder)     Seizure disorder (H)     Chronic back pain     Chronic neck pain     ROGER (generalized anxiety disorder)     Person who has had sex change operation     Osteoporosis     PAD (peripheral artery disease) (H)     Peripheral neuropathy     JOSE (obstructive sleep apnea)     Amputation above knee (H)     Blind left eye     Neurogenic bladder     Acid reflux disease     Hx of carotid artery stenosis     Complex sleep apnea syndrome     Dysphagia     CAD (coronary artery disease)     Chest pain on breathing     Chronic insomnia     Arthropathy of cervical facet joint        Current Outpatient Medications   Medication Sig Dispense Refill     ADVAIR DISKUS 250-50 MCG/DOSE inhaler INHALE 1 PUFF BY MOUTH TWICE DAILY 1 Inhaler 5     albuterol (PROAIR HFA/PROVENTIL HFA/VENTOLIN HFA) 108 (90 Base) MCG/ACT inhaler Inhale 2 puffs into the lungs every 6 hours 1 Inhaler 5     albuterol (PROVENTIL) (2.5 MG/3ML) 0.083% neb solution Take 1 vial (2.5 mg) by nebulization every 6 hours as needed for shortness of breath / dyspnea or wheezing 180 mL 3     alendronate (FOSAMAX) 70 MG tablet TAKE ONE TABLET BY MOUTH EVERY 7 DAYS (TAKE WITH 8OZ OF WATER 30 MINUTES BEFORE BREAKFAST AND REMAIN UPRIGHT DURING THIS TIME) 4 tablet 97     ASPERCREME LIDOCAINE 4 % Patch APPLY 1 PATCH TOPICALLY TO  LOWER BACK ONCE DAILY (ON FOR 12 HOURS, OFF FOR 12 HOURS) 30 patch 10     ASPIRIN LOW DOSE 81 MG chewable tablet CHEW AND SWALLOW ONE TABLET BY MOUTH IN THE MORNING 28 tablet 10     atorvastatin (LIPITOR) 40 MG tablet TAKE 1 TABLET BY MOUTH AT BEDTIME 28 tablet 10     blood glucose monitoring (ONE TOUCH ULTRA 2) meter device kit Use to test blood sugars 3 times daily or as directed. 1 kit 0     blood glucose monitoring (ONE TOUCH ULTRA) test strip Use to test blood sugars 3 times daily or as directed. 3 Box 3     blood glucose monitoring (ONE TOUCH ULTRASOFT) lancets Use to test blood sugar 3 times daily or as directed. 100 each PRN     brimonidine (ALPHAGAN) 0.2 % ophthalmic solution Place 1 drop into the right eye 2 times daily ( 12 hours apart).    Please keep 11-16-20 appt for refills. 5 mL 4     capsaicin-menthol-methyl sal 0.025-1-12 % external cream Apply 1 Application topically daily as needed (topical pain) 56.6 g 1     diclofenac (VOLTAREN) 1 % topical gel APPLY 2GM TOPICALLY TO AFFECTED AREA(S) FOUR TIMES A  g 3     dorzolamide (TRUSOPT) 2 % ophthalmic solution Place 1 drop into the right eye 2 times daily 1 Bottle 1     escitalopram (LEXAPRO) 10 MG tablet TAKE 1 TABLET BY MOUTH ONCE DAILY 28 tablet 10     FEROSUL 325 (65 Fe) MG tablet TAKE 1 TABLET BY MOUTH TWICE DAILY WITH MEALS 56 tablet 10     fluticasone (FLONASE) 50 MCG/ACT nasal spray Spray 2 sprays into both nostrils daily 15.8 mL 1     INCRUSE ELLIPTA 62.5 MCG/INH Inhaler INHALE 1 PUFF BY MOUTH ONCE DAILY 30 each 10     lactulose (CHRONULAC) 10 GM/15ML solution Take 30 mLs (20 g) by mouth as needed for constipation 236 mL 0     latanoprost (XALATAN) 0.005 % ophthalmic solution INSTILL ONE DROP IN EACH EYE AT BEDTIME 2.5 mL 10     levETIRAcetam (KEPPRA) 500 MG tablet TAKE 1 TABLET BY MOUTH TWICE DAILY 56 tablet 11     lisinopril (ZESTRIL) 20 MG tablet TAKE 1 TABLET BY MOUTH EVERY MORNING 28 tablet 11     loratadine (CLARITIN) 10 MG tablet  TAKE 1 TABLET BY MOUTH ONCE DAILY 28 tablet 11     Menthol, Topical Analgesic, 5 % GEL Externally apply topically daily as needed 113 g 0     metFORMIN (GLUCOPHAGE) 500 MG tablet TAKE 1 TABLET BY MOUTH EVERY EVENING WITH DINNER 28 tablet 11     metoprolol succinate ER (TOPROL-XL) 50 MG 24 hr tablet TAKE 1 TABLET BY MOUTH EVERY MORNING 30 tablet 10     Multiple Vitamins-Minerals (TAB-A-MACY) TABS TAKE 1 TABLET BY MOUTH ONCE DAILY 28 tablet 11     multivitamin, therapeutic (THERA-VIT) TABS tablet Take 1 tablet by mouth daily       nicotine (COMMIT) 2 MG lozenge Place 1 lozenge (2 mg) inside cheek every hour as needed for smoking cessation 60 lozenge 11     nicotine (NICODERM CQ) 14 MG/24HR 24 hr patch Place 1 patch onto the skin every 24 hours 30 patch 11     nitroGLYcerin (NITROSTAT) 0.4 MG sublingual tablet DISSOLVE ONE TABLET UNDER TONGUE AS NEEDED FOR CHEST PAIN MAY REPEAT EVERY 5 MINUTES FOR 3 DOSES, IF SYMPTOMS PERSIST CALL 911 25 tablet 3     Nutritional Supplements (ENSURE NUTRITION SHAKE) LIQD Take 1 Bottle by mouth 2 times daily With meals 60 Bottle 0     pantoprazole (PROTONIX) 40 MG EC tablet TAKE 1 TABLET BY MOUTH EVERY MORNING 28 tablet 11     phenytoin (DILANTIN) 100 MG capsule TAKE 2 CAPS (200MG) BY MOUTH TWICE A  capsule 11     phenytoin sodium extended (DILANTIN) 200 MG capsule TAKE 1 CAPSULE BY MOUTH TWICE DAILY 56 capsule 11     polyethylene glycol (MIRALAX) 17 GM/SCOOP powder MIX 17GM OF POWDER IN 8OZ OF WATER UNTIL COMPLETELY DISSOLVED. DRINK SOLUTION BY MOUTH IN THE MORNING 510 g 8     pregabalin (LYRICA) 150 MG capsule TAKE 1 CAPSULE BY MOUTH THREE TIMES DAILY 90 capsule 4     risperiDONE (RISPERDAL) 0.5 MG tablet TAKE 1 TABLET BY MOUTH AT BEDTIME 28 tablet 4     SENEXON-S 8.6-50 MG tablet TAKE 1 TABLET BY MOUTH TWICE DAILY 56 tablet 10     sennosides (SENOKOT) 8.6 MG tablet Take 1 tablet by mouth 2 times daily        solifenacin (VESICARE) 10 MG tablet TAKE 1 TABLET BY MOUTH EVERY  MORNING 28 tablet 10     traZODone (DESYREL) 150 MG tablet TAKE 1 TABLET BY MOUTH AT BEDTIME 28 tablet 10     vitamin D3 (CHOLECALCIFEROL) 10 MCG (400 UNIT) capsule Take 2 capsules by mouth daily       Vitamin D3 (VITAMIN D) 10 MCG (400 UNIT) tablet TAKE TWO TABLETS (800 UNITS) BY MOUTH ONCE DAILY 56 tablet 11       Past Surgical History:   Procedure Laterality Date     AMPUTATE LEG ABOVE KNEE Left 6/11/2016    Procedure: AMPUTATE LEG ABOVE KNEE;  Surgeon: Mello Rodriguez MD;  [facility]     AMPUTATE LEG BELOW KNEE Right 11/7/2016    Procedure: AMPUTATE LEG BELOW KNEE;  Surgeon: Savannah Durant MD;  [facility]     AMPUTATE REVISION STUMP LOWER EXTREMITY Right 11/11/2016    Procedure: AMPUTATE REVISION STUMP LOWER EXTREMITY;  Surgeon: Savannah Durant MD;  [facility]     AMPUTATE REVISION STUMP LOWER EXTREMITY Right 11/16/2016    Procedure: AMPUTATE REVISION STUMP LOWER EXTREMITY;  Surgeon: Savannah Durant MD;  [facility]     AMPUTATE TOE(S) Right 1/5/2016    Procedure: AMPUTATE TOE(S);  Surgeon: Mello Gaines DPM;  [facility]     ANGIOGRAM Bilateral 11/21/2014    Procedure: ANGIOGRAM;  Surgeon: Savannah Durant MD;  [facility]     ANGIOGRAM Left 1/16/2015    Procedure: ANGIOGRAM;  Surgeon: Savannah Durant MD;  [facility]     ANGIOGRAM Bilateral 9/14/2015    Procedure: ANGIOGRAM;  Surgeon: Savannah Durant MD;  [facility]     ANGIOGRAM Left 10/12/2015    Procedure: ANGIOGRAM;  Surgeon: Savannah Durant MD;  [facility]     ANGIOGRAM Right 6/6/2016    Procedure: ANGIOGRAM;  Surgeon: Savannah Durant MD;  [facility]     ANGIOPLASTY Right 6/6/2016    Procedure: ANGIOPLASTY;  Surgeon: Savannah Durant MD;  [facility]     APPENDECTOMY       BREAST BIOPSY, RT/LT Right     benign     CATARACT IOL, RT/LT Right      CHOLECYSTECTOMY       COLONOSCOPY N/A 8/25/2014    Procedure: COLONOSCOPY;  Surgeon: Mello Ferrer MD;  [facility]     COLONOSCOPY WITH CO2 INSUFFLATION N/A  8/20/2014    Procedure: COLONOSCOPY WITH CO2 INSUFFLATION;  Surgeon: Duane, William Charles, MD;  Location: [facility]     CYSTOSCOPY, INJECT BOTOX N/A 5/21/2020    Procedure: CYSTOSCOPY, WITH BOTULINUM TOXIN INJECTION;  Surgeon: Loki Gordon MD;  [facility]     CYSTOSCOPY, INJECT BOTOX N/A 10/8/2020    Procedure: CYSTOSCOPY, INJECT BOTOX INTO BLADDER, SUPRAPUBIC TUBE EXCHNAGE;  Surgeon: Loki Gordon MD;  [facility]     CYSTOSCOPY, INTRAVESICAL INJECTION N/A 10/31/2019    Procedure: CYSTOSCOPY, BOTOX INJECTION, Suprapubic Catheter Exchange;  Surgeon: Loki Gordon MD;  [facility]     CYSTOSTOMY, INSERT TUBE SUPRAPUBIC, COMBINED N/A 1/16/2018    Procedure: COMBINED CYSTOSTOMY, INSERT TUBE SUPRAPUBIC;  Cystoscopy, Intraoperative Ultrasound, Suprapubic Tube Placement;  Surgeon: Keanu Dawson MD;  [facility]     ENDARTERECTOMY FEMORAL  5/23/2014    Procedure: ENDARTERECTOMY FEMORAL;  Surgeon: Jason Joshi MD;  [facility]     ESOPHAGOSCOPY, GASTROSCOPY, DUODENOSCOPY (EGD), COMBINED  12/14/2012    Procedure: COMBINED ESOPHAGOSCOPY, GASTROSCOPY, DUODENOSCOPY (EGD), BIOPSY SINGLE OR MULTIPLE;  ESOPHAGOSCOPY, GASTROSCOPY, DUODENOSCOPY (EGD), DILATATION ;  Surgeon: Elizabeth Stevenson MD;  Location:  [facility]     ESOPHAGOSCOPY, GASTROSCOPY, DUODENOSCOPY (EGD), COMBINED  12/31/2013    Procedure: COMBINED ESOPHAGOSCOPY, GASTROSCOPY, DUODENOSCOPY (EGD), BIOPSY SINGLE OR MULTIPLE;;  Surgeon: Clemente Lopez MD;  Location:  [facility]     ESOPHAGOSCOPY, GASTROSCOPY, DUODENOSCOPY (EGD), COMBINED  4/1/2014    Procedure: COMBINED ESOPHAGOSCOPY, GASTROSCOPY, DUODENOSCOPY (EGD);;  Surgeon: Clemente Lopez MD;  Location:  [facility]     ESOPHAGOSCOPY, GASTROSCOPY, DUODENOSCOPY (EGD), COMBINED  6/28/2014    Procedure: COMBINED ESOPHAGOSCOPY, GASTROSCOPY, DUODENOSCOPY (EGD);  Surgeon: Clemente Lopez MD;  Location:  [facility]     ESOPHAGOSCOPY, GASTROSCOPY, DUODENOSCOPY (EGD), COMBINED N/A  8/20/2014    Procedure: COMBINED ESOPHAGOSCOPY, GASTROSCOPY, DUODENOSCOPY (EGD), BIOPSY SINGLE OR MULTIPLE;  Surgeon: Duane, William Charles, MD;  Location:  OR     ESOPHAGOSCOPY, GASTROSCOPY, DUODENOSCOPY (EGD), COMBINED N/A 8/22/2014    Procedure: COMBINED ESOPHAGOSCOPY, GASTROSCOPY, DUODENOSCOPY (EGD), BIOPSY SINGLE OR MULTIPLE;  Surgeon: Mello Ferrer MD;  [facility]     ESOPHAGOSCOPY, GASTROSCOPY, DUODENOSCOPY (EGD), COMBINED N/A 10/2/2014    Procedure: COMBINED ESOPHAGOSCOPY, GASTROSCOPY, DUODENOSCOPY (EGD), BIOPSY SINGLE OR MULTIPLE;  Surgeon: Remy Haskins MD;  [facility]     ESOPHAGOSCOPY, GASTROSCOPY, DUODENOSCOPY (EGD), COMBINED Left 12/15/2014    Procedure: COMBINED ESOPHAGOSCOPY, GASTROSCOPY, DUODENOSCOPY (EGD), BIOPSY SINGLE OR MULTIPLE;  Surgeon: Remy Haskins MD;  [facility]     ESOPHAGOSCOPY, GASTROSCOPY, DUODENOSCOPY (EGD), COMBINED N/A 2/25/2015    Procedure: COMBINED ENDOSCOPIC ULTRASOUND, ESOPHAGOSCOPY, GASTROSCOPY, DUODENOSCOPY (EGD), FINE NEEDLE ASPIRATE/BIOPSY;  Surgeon: Clemente Lugo MD;  [facility]     ESOPHAGOSCOPY, GASTROSCOPY, DUODENOSCOPY (EGD), COMBINED Left 2/25/2015    Procedure: COMBINED ESOPHAGOSCOPY, GASTROSCOPY, DUODENOSCOPY (EGD), BIOPSY SINGLE OR MULTIPLE;  Surgeon: Clemente Lugo MD;  [facility]     ESOPHAGOSCOPY, GASTROSCOPY, DUODENOSCOPY (EGD), COMBINED N/A 9/25/2016    Procedure: COMBINED ESOPHAGOSCOPY, GASTROSCOPY, DUODENOSCOPY (EGD);  Surgeon: Aziza Patiño MD;  [facility]     ESOPHAGOSCOPY, GASTROSCOPY, DUODENOSCOPY (EGD), COMBINED N/A 1/18/2017    Procedure: COMBINED ESOPHAGOSCOPY, GASTROSCOPY, DUODENOSCOPY (EGD), BIOPSY SINGLE OR MULTIPLE;  Surgeon: Clemente Lopez MD;  [facility]     ESOPHAGOSCOPY, GASTROSCOPY, DUODENOSCOPY (EGD), COMBINED N/A 11/26/2017    Procedure: COMBINED ESOPHAGOSCOPY, GASTROSCOPY, DUODENOSCOPY (EGD), REMOVE FOREIGN BODY;  Esophagogastroduodenoscopy with foreign body  extraction  ;  Surgeon: Herberth Castrejon MD;  [facility]     ESOPHAGOSCOPY, GASTROSCOPY, DUODENOSCOPY (EGD), COMBINED N/A 11/26/2017    Procedure: COMBINED ESOPHAGOSCOPY, GASTROSCOPY, DUODENOSCOPY (EGD), REMOVE FOREIGN BODY;;  Surgeon: Herberth Castrejon MD;  [facility]     ESOPHAGOSCOPY, GASTROSCOPY, DUODENOSCOPY (EGD), COMBINED N/A 4/10/2020    Procedure: ESOPHAGOGASTRODUODENOSCOPY (EGD);  Surgeon: Yael Cabral MD;  [facility]     FASCIOTOMY LOWER EXTREMITY Left 6/10/2016    Procedure: FASCIOTOMY LOWER EXTREMITY;  Surgeon: Mello Rodriguez MD;  [facility]     HC CAPSULE ENDOSCOPY N/A 8/25/2014    Procedure: CAPSULE/PILL CAM ENDOSCOPY;  Surgeon: Remy Haskins MD;  [facility]     HC CAPSULE ENDOSCOPY N/A 10/2/2014    Procedure: CAPSULE/PILL CAM ENDOSCOPY;  Surgeon: Remy Haskins MD;  [facility]     ORTHOPEDIC SURGERY      broken wrist repair     REVISE CATHETER INTRATHECAL  08/24/2020     SEX TRANSFORMATION SURGERY, MALE TO FEMALE  1974 1974     SINUS SURGERY      cyst removed     TONSILLECTOMY       VASCULAR SURGERY  2006    Left carotid stent          Allergies   Allergen Reactions     Bee Venom Anaphylaxis     Penicillins Anaphylaxis     Dilantin [Phenytoin] Other (See Comments)     Generic dilantin only per pt     Iodine Hives     Novocaine [Procaine] Hives     Tositumomab Unknown         Psychiatric History     Prior Psychiatric Diagnoses: yes, Depression   Psychiatric Hospitalizations: none   Use of Psychotropics yes, see above      Chemical Use     Prior Chemical Dependency Treatment: none         Family History     Family History   Problem Relation Age of Onset     Dementia Mother      Diabetes Mother         type unknown     Coronary Artery Disease Mother         MI     Glaucoma Father      Diabetes Father         type unknown     Heart Failure Father      Schizophrenia Brother      Depression Brother      Suicide Sister      Depression Sister       Diabetes Sister      Ovarian Cancer Maternal Aunt      Leukemia Maternal Aunt      Diabetes Maternal Grandmother      Diabetes Maternal Grandfather      Diabetes Paternal Grandmother      Diabetes Paternal Grandfather      Breast Cancer Sister      Cerebrovascular Disease Brother      Colon Cancer No family hx of      Macular Degeneration No family hx of        Sleep Disorders: none reported    Social History     Social History     Socioeconomic History     Marital status:      Spouse name: None     Number of children: 2     Years of education: None     Highest education level: None   Occupational History     Occupation: Retired   Social Needs     Financial resource strain: None     Food insecurity     Worry: None     Inability: None     Transportation needs     Medical: None     Non-medical: None   Tobacco Use     Smoking status: Current Every Day Smoker     Packs/day: 1.00     Years: 30.00     Pack years: 30.00     Types: Cigarettes, Cigars     Smokeless tobacco: Never Used     Tobacco comment: 1.5 packs per day    Substance and Sexual Activity     Alcohol use: No     Alcohol/week: 0.0 standard drinks     Drug use: No     Sexual activity: Not Currently   Lifestyle     Physical activity     Days per week: None     Minutes per session: None     Stress: None   Relationships     Social connections     Talks on phone: None     Gets together: None     Attends Yarsani service: None     Active member of club or organization: None     Attends meetings of clubs or organizations: None     Relationship status: None     Intimate partner violence     Fear of current or ex partner: None     Emotionally abused: None     Physically abused: None     Forced sexual activity: None   Other Topics Concern     Parent/sibling w/ CABG, MI or angioplasty before 65F 55M? Not Asked      Service Not Asked     Blood Transfusions Not Asked     Caffeine Concern No     Comment: 1 in the morning     Occupational Exposure Not  Asked     Hobby Hazards Not Asked     Sleep Concern Not Asked     Stress Concern Not Asked     Weight Concern Not Asked     Special Diet Not Asked     Back Care Not Asked     Exercise Not Asked     Bike Helmet Not Asked     Seat Belt Not Asked     Self-Exams Not Asked   Social History Narrative    Living in a nursing home (as of 2020) for prosthetic fitting and therapy; usually resides in an EZ.    Two adopted children (Kam and Prashanth) and 3 grandchildren (as of 2020).       Assisted living, retired, on disability     Mental Status Examination     Sonya presented as oriented X3 with speech language intact.  The patient was cooperative throughout the evaluation with no signs of hallucinations, delusions, loosening of associations or other thought disturbance.  Mood was normal.  Affect was congruent with mood. Insight and judgement we intact.  Memory was intact for recent and remote elements.  There was no report of suicidal ideation, intention or plan. Attention and concentration were within normal.          Copy:   Allan Casey MD  [id number]  [place] [address]    Mello Campos PsyD, LP, DBSM  Diplomate, Behavioral Sleep Medicine  [facility] -  [place] and [place]

## 2020-10-23 ENCOUNTER — VIRTUAL VISIT (OUTPATIENT)
Dept: SLEEP MEDICINE | Facility: CLINIC | Age: 70
End: 2020-10-23
Attending: INTERNAL MEDICINE
Payer: COMMERCIAL

## 2020-10-23 DIAGNOSIS — F51.04 CHRONIC INSOMNIA: Primary | ICD-10-CM

## 2020-10-23 DIAGNOSIS — G47.39 COMPLEX SLEEP APNEA SYNDROME: ICD-10-CM

## 2020-10-23 PROCEDURE — 90791 PSYCH DIAGNOSTIC EVALUATION: CPT | Mod: TEL | Performed by: PSYCHOLOGIST

## 2020-10-24 ENCOUNTER — RECORDS - HEALTHEAST (OUTPATIENT)
Dept: LAB | Facility: CLINIC | Age: 70
End: 2020-10-24

## 2020-10-24 ENCOUNTER — TRANSFERRED RECORDS (OUTPATIENT)
Dept: HEALTH INFORMATION MANAGEMENT | Facility: CLINIC | Age: 70
End: 2020-10-24

## 2020-10-24 ENCOUNTER — NURSE TRIAGE (OUTPATIENT)
Dept: NURSING | Facility: CLINIC | Age: 70
End: 2020-10-24

## 2020-10-24 LAB
ALBUMIN UR-MCNC: NEGATIVE MG/DL
AMORPH CRY #/AREA URNS HPF: ABNORMAL /[HPF]
APPEARANCE UR: ABNORMAL
BACTERIA #/AREA URNS HPF: ABNORMAL HPF
BILIRUB UR QL STRIP: NEGATIVE
COLOR UR AUTO: YELLOW
GLUCOSE UR STRIP-MCNC: NEGATIVE MG/DL
HGB UR QL STRIP: ABNORMAL
KETONES UR STRIP-MCNC: NEGATIVE MG/DL
LEUKOCYTE ESTERASE UR QL STRIP: ABNORMAL
NITRATE UR QL: NEGATIVE
PH UR STRIP: 8 [PH] (ref 4.5–8)
RBC #/AREA URNS AUTO: ABNORMAL HPF
SP GR UR STRIP: 1.01 (ref 1–1.03)
SQUAMOUS #/AREA URNS AUTO: ABNORMAL LPF
UROBILINOGEN UR STRIP-ACNC: ABNORMAL
WBC #/AREA URNS AUTO: ABNORMAL HPF

## 2020-10-24 NOTE — TELEPHONE ENCOUNTER
Pt states she has a UTI. No UA completed yet but is requesting orders for a UA/UC.  Pt states she talked to nurse at [facility] and was told to call to see if they can get an order for a UA to be completed at the assisted living facility.   Burning, odor to the urine, pain with urination. Rates pain as severe.   + flank and back pain & nausea  97 temp  BS is 100    Treated with Cipro 10/6/2020 for 5 days for UTI   Pt states she lives in assisted living, states the nurse there needs an order to do a urine test.   Pt has a suprapubic catheter per patient.     Verbal permission to speak to nurse at [facility] living [facility] regarding situation.     EDWARDO Miranda-- okay for a verbal over the weekend. [phone] Looking for orders for a UA/UC.  RN advised that RN would need to page on-call provider to discuss to see if they are willing to order UA/UC.     RN will page on-call provider and will call both EDWARDO Miranda and patient back with update.      Dr. Tanner Gamble, on-call provider for Dr. Jacinto leosd at 4:20pm.   Second page for Dr. Tanner Gamble at 4:51pm  Third attempt to page at 5:39pm.  RN was on with paging for 13 minutes as they attempted to reach on-call provider.      Page returned from Dr. Resendiz at 5:51pm.    Per Dr. Ness Resendiz   Verbal order to order UA/UC     Read back and confirmed with Dr. Resendiz.     Patient notified.  EDWARDO Miranda at [facility] notified.     RN advised patient to call back with any new or worsening sx.   Tereza Jaime RN 10/24/20 5:48 PM  [facility] Nurse Advisor          Additional Information    Negative: Shock suspected (e.g., cold/pale/clammy skin, too weak to stand, low BP, rapid pulse)    Negative: Sounds like a life-threatening emergency to the triager    Negative: Followed a genital area injury    Negative: Taking antibiotic for urinary tract infection (UTI)    Negative: Pregnant    Negative: Postpartum (from 0 to 6 weeks after delivery)    Negative: [1]  Unable to urinate (or only a few drops) > 4 hours AND [2] bladder feels very full (e.g., palpable bladder or strong urge to urinate)    Negative: Vomiting    Negative: Patient sounds very sick or weak to the triager    [1] SEVERE pain with urination  (e.g., excruciating) AND [2] not improved after 2 hours of pain medicine and Sitz bath    Protocols used: URINATION PAIN - FEMALE-A-

## 2020-10-25 ENCOUNTER — NURSE TRIAGE (OUTPATIENT)
Dept: NURSING | Facility: CLINIC | Age: 70
End: 2020-10-25

## 2020-10-25 NOTE — TELEPHONE ENCOUNTER
Ruben, her nurse from her care home, is calling.    Has UA results. Waiting for the culture.  Cloudy  Trace of blood  Trace of Leukocytes  Few Bacteria  RBC 3-5  WBC 5-10.  FYI....    Paulina Chavez RN  [facility] Nurse Advisor  10/25/2020 at 5:47 PM      Reason for Disposition    Lab or radiology calling with test results    Additional Information    Negative: Lab calling with strep throat test results and triager can call in prescription    Negative: Lab calling with urinalysis test results and triager can call in prescription    Negative: Medication questions    Negative: ED call to PCP    Negative: Physician call to PCP    Negative: Call about patient who is currently hospitalized    Negative: Lab or radiology calling with CRITICAL test results    Negative: [1] Prescription not at pharmacy AND [2] was prescribed today by PCP    Negative: [1] Follow-up call from patient regarding patient's clinical status AND [2] information urgent    Negative: [1] Caller requests to speak ONLY to PCP AND [2] urgent question    Negative: [1] Caller requests to speak to PCP now AND [2] won't tell us reason for call  (Exception: if 10 pm to 6 am, caller must first discuss reason for the call)    Negative: Notification of hospital admission    Negative: Notification of death    Negative: Caller requesting lab results    Protocols used: PCP CALL - NO TRIAGE-A-    Paulina Chavez RN  [facility] Nurse Advisor  10/25/2020 at 5:49 PM

## 2020-10-26 ENCOUNTER — MEDICAL CORRESPONDENCE (OUTPATIENT)
Dept: HEALTH INFORMATION MANAGEMENT | Facility: CLINIC | Age: 70
End: 2020-10-26

## 2020-10-26 DIAGNOSIS — J30.9 ALLERGIC RHINITIS, UNSPECIFIED SEASONALITY, UNSPECIFIED TRIGGER: ICD-10-CM

## 2020-10-27 ENCOUNTER — ANCILLARY PROCEDURE (OUTPATIENT)
Dept: MAMMOGRAPHY | Facility: CLINIC | Age: 70
End: 2020-10-27
Attending: INTERNAL MEDICINE
Payer: COMMERCIAL

## 2020-10-27 ENCOUNTER — TELEPHONE (OUTPATIENT)
Dept: INTERNAL MEDICINE | Facility: CLINIC | Age: 70
End: 2020-10-27

## 2020-10-27 DIAGNOSIS — R35.0 URINARY FREQUENCY: Primary | ICD-10-CM

## 2020-10-27 DIAGNOSIS — Z12.31 VISIT FOR SCREENING MAMMOGRAM: ICD-10-CM

## 2020-10-27 DIAGNOSIS — Z11.59 ENCOUNTER FOR SCREENING FOR OTHER VIRAL DISEASES: ICD-10-CM

## 2020-10-27 LAB — BACTERIA SPEC CULT: ABNORMAL

## 2020-10-27 PROCEDURE — 77067 SCR MAMMO BI INCL CAD: CPT | Mod: GC

## 2020-10-27 PROCEDURE — 77063 BREAST TOMOSYNTHESIS BI: CPT | Mod: GC

## 2020-10-27 PROCEDURE — 99000 SPECIMEN HANDLING OFFICE-LAB: CPT | Performed by: PATHOLOGY

## 2020-10-27 PROCEDURE — U0003 INFECTIOUS AGENT DETECTION BY NUCLEIC ACID (DNA OR RNA); SEVERE ACUTE RESPIRATORY SYNDROME CORONAVIRUS 2 (SARS-COV-2) (CORONAVIRUS DISEASE [COVID-19]), AMPLIFIED PROBE TECHNIQUE, MAKING USE OF HIGH THROUGHPUT TECHNOLOGIES AS DESCRIBED BY CMS-2020-01-R: HCPCS | Mod: 90 | Performed by: PATHOLOGY

## 2020-10-27 RX ORDER — LEVOFLOXACIN 250 MG/1
250 TABLET, FILM COATED ORAL DAILY
Qty: 7 TABLET | Refills: 0
Start: 2020-10-27 | End: 2020-11-04

## 2020-10-27 RX ORDER — FLUTICASONE PROPIONATE 50 MCG
SPRAY, SUSPENSION (ML) NASAL
Qty: 16 G | Refills: 11 | Status: SHIPPED | OUTPATIENT
Start: 2020-10-27

## 2020-10-28 ENCOUNTER — HOSPITAL ENCOUNTER (OUTPATIENT)
Dept: PHYSICAL THERAPY | Facility: CLINIC | Age: 70
Setting detail: THERAPIES SERIES
End: 2020-10-28
Attending: INTERNAL MEDICINE
Payer: COMMERCIAL

## 2020-10-28 LAB
SARS-COV-2 RNA SPEC QL NAA+PROBE: NOT DETECTED
SPECIMEN SOURCE: NORMAL

## 2020-10-28 PROCEDURE — 97530 THERAPEUTIC ACTIVITIES: CPT | Mod: GP | Performed by: PHYSICAL THERAPIST

## 2020-10-28 PROCEDURE — 97161 PT EVAL LOW COMPLEX 20 MIN: CPT | Mod: GP | Performed by: PHYSICAL THERAPIST

## 2020-10-29 DIAGNOSIS — Z53.9 DIAGNOSIS NOT YET DEFINED: Primary | ICD-10-CM

## 2020-10-29 PROCEDURE — G0179 MD RECERTIFICATION HHA PT: HCPCS | Performed by: INTERNAL MEDICINE

## 2020-10-30 ENCOUNTER — ANCILLARY PROCEDURE (OUTPATIENT)
Dept: RADIOLOGY | Facility: AMBULATORY SURGERY CENTER | Age: 70
End: 2020-10-30
Attending: ANESTHESIOLOGY
Payer: COMMERCIAL

## 2020-10-30 ENCOUNTER — HOSPITAL ENCOUNTER (OUTPATIENT)
Facility: AMBULATORY SURGERY CENTER | Age: 70
Discharge: HOME | End: 2020-10-30
Attending: ANESTHESIOLOGY | Admitting: ANESTHESIOLOGY
Payer: COMMERCIAL

## 2020-10-30 VITALS
SYSTOLIC BLOOD PRESSURE: 107 MMHG | HEIGHT: 55 IN | OXYGEN SATURATION: 98 % | HEART RATE: 63 BPM | RESPIRATION RATE: 16 BRPM | DIASTOLIC BLOOD PRESSURE: 54 MMHG | TEMPERATURE: 97.3 F | WEIGHT: 120 LBS | BODY MASS INDEX: 27.77 KG/M2

## 2020-10-30 DIAGNOSIS — R52 PAIN: ICD-10-CM

## 2020-10-30 DIAGNOSIS — M47.812 ARTHROPATHY OF CERVICAL FACET JOINT: ICD-10-CM

## 2020-10-30 LAB — GLUCOSE BLDC GLUCOMTR-MCNC: 94 MG/DL (ref 70–99)

## 2020-10-30 PROCEDURE — 64490 INJ PARAVERT F JNT C/T 1 LEV: CPT | Mod: RT

## 2020-10-30 RX ORDER — BUPIVACAINE HYDROCHLORIDE 2.5 MG/ML
INJECTION, SOLUTION INFILTRATION; PERINEURAL PRN
Status: DISCONTINUED | OUTPATIENT
Start: 2020-10-30 | End: 2020-10-30 | Stop reason: HOSPADM

## 2020-10-30 RX ORDER — IOPAMIDOL 408 MG/ML
INJECTION, SOLUTION INTRATHECAL PRN
Status: DISCONTINUED | OUTPATIENT
Start: 2020-10-30 | End: 2020-10-30 | Stop reason: HOSPADM

## 2020-10-30 ASSESSMENT — MIFFLIN-ST. JEOR: SCORE: 710.95

## 2020-10-30 NOTE — DISCHARGE INSTRUCTIONS
PAIN INJECTION HOME CARE INSTRUCTIONS  Activity  -You may resume most normal activity levels with the exception of strenuous activity. It may help to move in ways that hurt before the injection, to see if the pain is still there, but do not overdo it. Take it easy for the rest of the day.    -DO NOT remove bandaid for 24 hours  -DO NOT shower for 24 hours      Pain  -You may feel immediate pain relief and numbness for a period of time after the injection. This may indicate the medication has reached the right spot.  -Your pain may return after this short pain-free period, or may even be a little worse for a day or two. It may be caused by needle irritation or by the medication itself. The medications usually take two or three days to start working, but can take as long as a week.    -You may use an ice pack for 20 minutes every 2 hours for the first 24 hours  -You may use a heating pad after the first 24 hours  -You may use Tylenol (acetaminophen) every 4 hours or other pain medicines as directed by your physician      DID YOU RECEIVE STEROIDS TODAY?  No    Common side effects of steroids:  Not everyone will experience corticosteroid side effects. If side effects are experienced, they will gradually subside in the 7-10 day period following an injection. Most common side effects include:  -Flushed face and/or chest  -Feeling of warmth, particularly in the face but could be an overall feeling of warmth  -Increased blood sugar in diabetic patients  -Menstrual irregularities my occur. If taking hormone-based birth control an alternate method of birth control is recommended  -Sleep disturbances and/or mood swings are possible  -Leg cramps    PLEASE KEEP TRACK OF YOUR SYMPTOMS AND NOTE ANY CHANGES FOR YOUR DOCTOR.       Please contact us if you have:  -Severe pain  -Fever more than 101.5 degrees Fahrenheit  -Signs of infection at the injection site (redness, swelling, or drainage)    If you have questions, please contact  the Pain Clinic at [phone] Option #1 between the hours of 7:00 am and 3:00 pm Monday through Friday. After office hours you can contact the on call provider by dialing [phone]. If you need immediate attention, we recommend that you go to a hospital emergency room or dial [phone].    If you have procedure scheduling questions please call [phone] Option #2

## 2020-11-02 ENCOUNTER — MEDICAL CORRESPONDENCE (OUTPATIENT)
Dept: HEALTH INFORMATION MANAGEMENT | Facility: CLINIC | Age: 70
End: 2020-11-02

## 2020-11-02 NOTE — H&P
ABBREVIATED H&P [facility] AMBULATORY SURGERY CENTER      Patient Name: Sonya Foote   MRN: [id number]   YOB: 1950     1. Reason for Procedure:  Procedure Summary     Date: 10/30/20 [place] [facility] [place] 06 / [facility]    Anesthesia Start:  Anesthesia Stop:     Procedure: Cervical 3, 4, and 5 Medial Branch Block (Left Neck) Diagnosis:       Arthropathy of cervical facet joint      (Arthropathy of cervical facet joint [M47.812])    Providers: Evelyn Lima MD Responsible Provider:     Anesthesia Type: Not recorded ASA Status: Not recorded          2. 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     CAD (coronary artery disease)     UA and inferior MI in 2016 s/p PCI     Chronic back pain      Chronic neck pain      Chronic pain syndrome     with fentanyl intrathecal pain pump     Complex sleep apnea syndrome      COPD (chronic obstructive pulmonary disease) (H)      Dysphagia     chronic due to esophageal strictures and multiple previous dilitations      ROGER (generalized anxiety disorder)      History of blood transfusion     x5; no adverse reactions     History of central retinal artery occlusion 2006    left-sided     History of stroke     residual left-sided weakness     Hx of carotid artery stenosis     s/p left carotid stent in 2006 and balloon angioplasty in 2016     MDD (major depressive disorder)      Neurogenic bladder     SPT in place     JOSE (obstructive sleep apnea)      Osteoporosis      PAD (peripheral artery disease) (H)      Peripheral neuropathy      Person who has had sex change operation     male to female     Seizure disorder (H)     many years since last grand mal; daily, brief petit mals     Sickle cell trait (H)      Type 2 diabetes mellitus (H)         Comorbidities: diabetes and COPD, HTN, JOSE, CAD    Any history of sleep apnea? Yes,      Any history of problems with sedation? No    3. Physical:    General: Normal  Skin:  Normal.  Respiratory: Clear to auscultation bilateral, no wheezing  Cardio:  Regular rate and rhythm  Abdomen: Soft, nontender, nondistended, no palpable masses.  Musculoskeletal: Normal  Neuro: Sensory exam normal, motor exam 5/5, bilateral upper and lower extremities       4. Current Medications (if not in Epic):   Current Outpatient Medications   Medication Sig Dispense Refill     ADVAIR DISKUS 250-50 MCG/DOSE inhaler INHALE 1 PUFF BY MOUTH TWICE DAILY 1 Inhaler 5     albuterol (PROAIR HFA/PROVENTIL HFA/VENTOLIN HFA) 108 (90 Base) MCG/ACT inhaler Inhale 2 puffs into the lungs every 6 hours 1 Inhaler 5     albuterol (PROVENTIL) (2.5 MG/3ML) 0.083% neb solution Take 1 vial (2.5 mg) by nebulization every 6 hours as needed for shortness of breath / dyspnea or wheezing 180 mL 3     alendronate (FOSAMAX) 70 MG tablet TAKE ONE TABLET BY MOUTH EVERY 7 DAYS (TAKE WITH 8OZ OF WATER 30 MINUTES BEFORE BREAKFAST AND REMAIN UPRIGHT DURING THIS TIME) 4 tablet 97     ASPERCREME LIDOCAINE 4 % Patch APPLY 1 PATCH TOPICALLY TO LOWER BACK ONCE DAILY (ON FOR 12 HOURS, OFF FOR 12 HOURS) 30 patch 10     ASPIRIN LOW DOSE 81 MG chewable tablet CHEW AND SWALLOW ONE TABLET BY MOUTH IN THE MORNING 28 tablet 10     atorvastatin (LIPITOR) 40 MG tablet TAKE 1 TABLET BY MOUTH AT BEDTIME 28 tablet 10     blood glucose monitoring (ONE TOUCH ULTRA 2) meter device kit Use to test blood sugars 3 times daily or as directed. 1 kit 0     blood glucose monitoring (ONE TOUCH ULTRA) test strip Use to test blood sugars 3 times daily or as directed. 3 Box 3     blood glucose monitoring (ONE TOUCH ULTRASOFT) lancets Use to test blood sugar 3 times daily or as directed. 100 each PRN     brimonidine (ALPHAGAN) 0.2 % ophthalmic solution Place 1 drop into the right eye 2 times daily (  12 hours apart).    Please keep 11-16-20 appt for refills. 5 mL 4     capsaicin-menthol-methyl sal 0.025-1-12 % external cream Apply 1 Application topically daily as needed (topical pain) 56.6 g 1     diclofenac (VOLTAREN) 1 % topical gel APPLY 2GM TOPICALLY TO AFFECTED AREA(S) FOUR TIMES A  g 3     dorzolamide (TRUSOPT) 2 % ophthalmic solution Place 1 drop into the right eye 2 times daily 1 Bottle 1     escitalopram (LEXAPRO) 10 MG tablet TAKE 1 TABLET BY MOUTH ONCE DAILY 28 tablet 10     FEROSUL 325 (65 Fe) MG tablet TAKE 1 TABLET BY MOUTH TWICE DAILY WITH MEALS 56 tablet 10     fluticasone (FLONASE) 50 MCG/ACT nasal spray SPRAY 2 SPRAYS IN EACH NOSTRIL DAILY 16 g 11     INCRUSE ELLIPTA 62.5 MCG/INH Inhaler INHALE 1 PUFF BY MOUTH ONCE DAILY 30 each 10     lactulose (CHRONULAC) 10 GM/15ML solution Take 30 mLs (20 g) by mouth as needed for constipation 236 mL 0     latanoprost (XALATAN) 0.005 % ophthalmic solution INSTILL ONE DROP IN EACH EYE AT BEDTIME 2.5 mL 10     levETIRAcetam (KEPPRA) 500 MG tablet TAKE 1 TABLET BY MOUTH TWICE DAILY 56 tablet 11     levofloxacin (LEVAQUIN) 250 MG tablet Take 1 tablet (250 mg) by mouth daily 7 tablet 0     lisinopril (ZESTRIL) 20 MG tablet TAKE 1 TABLET BY MOUTH EVERY MORNING 28 tablet 11     loratadine (CLARITIN) 10 MG tablet TAKE 1 TABLET BY MOUTH ONCE DAILY 28 tablet 11     Menthol, Topical Analgesic, 5 % GEL Externally apply topically daily as needed 113 g 0     metFORMIN (GLUCOPHAGE) 500 MG tablet TAKE 1 TABLET BY MOUTH EVERY EVENING WITH DINNER 28 tablet 11     metoprolol succinate ER (TOPROL-XL) 50 MG 24 hr tablet TAKE 1 TABLET BY MOUTH EVERY MORNING 30 tablet 10     Multiple Vitamins-Minerals (TAB-A-MACY) TABS TAKE 1 TABLET BY MOUTH ONCE DAILY 28 tablet 11     multivitamin, therapeutic (THERA-VIT) TABS tablet Take 1 tablet by mouth daily       nicotine (COMMIT) 2 MG lozenge Place 1 lozenge (2 mg) inside cheek every hour as needed for smoking cessation 60 lozenge  11     nicotine (NICODERM CQ) 14 MG/24HR 24 hr patch Place 1 patch onto the skin every 24 hours 30 patch 11     nitroGLYcerin (NITROSTAT) 0.4 MG sublingual tablet DISSOLVE ONE TABLET UNDER TONGUE AS NEEDED FOR CHEST PAIN MAY REPEAT EVERY 5 MINUTES FOR 3 DOSES, IF SYMPTOMS PERSIST CALL 911 25 tablet 3     Nutritional Supplements (ENSURE NUTRITION SHAKE) LIQD Take 1 Bottle by mouth 2 times daily With meals 60 Bottle 0     pantoprazole (PROTONIX) 40 MG EC tablet TAKE 1 TABLET BY MOUTH EVERY MORNING 28 tablet 11     phenytoin (DILANTIN) 100 MG capsule TAKE 2 CAPS (200MG) BY MOUTH TWICE A  capsule 11     phenytoin sodium extended (DILANTIN) 200 MG capsule TAKE 1 CAPSULE BY MOUTH TWICE DAILY 56 capsule 11     polyethylene glycol (MIRALAX) 17 GM/SCOOP powder MIX 17GM OF POWDER IN 8OZ OF WATER UNTIL COMPLETELY DISSOLVED. DRINK SOLUTION BY MOUTH IN THE MORNING 510 g 8     pregabalin (LYRICA) 150 MG capsule TAKE 1 CAPSULE BY MOUTH THREE TIMES DAILY 90 capsule 4     risperiDONE (RISPERDAL) 0.5 MG tablet TAKE 1 TABLET BY MOUTH AT BEDTIME 28 tablet 4     SENEXON-S 8.6-50 MG tablet TAKE 1 TABLET BY MOUTH TWICE DAILY 56 tablet 10     sennosides (SENOKOT) 8.6 MG tablet Take 1 tablet by mouth 2 times daily        solifenacin (VESICARE) 10 MG tablet TAKE 1 TABLET BY MOUTH EVERY MORNING 28 tablet 10     traZODone (DESYREL) 150 MG tablet TAKE 1 TABLET BY MOUTH AT BEDTIME 28 tablet 10     vitamin D3 (CHOLECALCIFEROL) 10 MCG (400 UNIT) capsule Take 2 capsules by mouth daily       Vitamin D3 (VITAMIN D) 10 MCG (400 UNIT) tablet TAKE TWO TABLETS (800 UNITS) BY MOUTH ONCE DAILY 56 tablet 11        5. Allergies and Reactions:  is allergic to bee venom; penicillins; dilantin [phenytoin]; iodine; novocaine [procaine]; and tositumomab.

## 2020-11-02 NOTE — PROCEDURES
Patient: Sonya Foote Age: 70 year old   MRN: [id number] Attending: Dr. Lima     Date of Visit: October 30, 2020      PAIN MEDICINE CLINIC PROCEDURE NOTE    ATTENDING CLINICIAN:    Evelyn Lima MD    ASSISTANT CLINICIAN:      PREPROCEDURE DIAGNOSES:  1.  Cervical facet arthropathy   2.  Cervicalgia  3.  Neck pain       POSTPROCEDURE DIAGNOSES:  1.  Cervical facet arthropathy   2.  Cervicalgia  3.  Neck pain       PROCEDURE(S) PERFORMED:  1.  C3, C4, C5 medial branch block(s)   2.  Fluoroscopic guidance for the above procedures    ANESTHESIA:  Local.    BLOOD LOSS:  Minimal.    DRAINS AND SPECIMENS:  None.    COMPLICATIONS:  None.    INDICATIONS:  Sonya Foote is a 70 year old female with a history of cervical facet arthropathy.  The patient stated that the patient was in their usual state of health and denied recent anticoagulant use or recent infections.  Therefore, the plan is to perform above mentioned procedures.     Procedure Details:  The patient was met in the procedure room, where the patient was identified by name, medical record number and date of birth.  All of the patient s last minute questions were answered. Written informed consent was obtained and saved in the electronic medical record, after the risks, benefits, and alternatives were discussed with the patient.      A formal time-out procedure was performed as per protocol, including patient name, title of procedure, and site of procedure, and all in the room concurred.  Routine monitors were applied.      The patient was placed in the right lateral decubitus position  on the procedure room table.  All pressure points were checked and comfortably padded.  Routine monitors were placed.  Vital signs were stable.    A chlorhexidine prep was completed followed by sterile draping per standard procedure.     Using C-arm AP fluoroscopy we identified center of the trapezoid of the right C3 and C4 and C5 cervical vertebrae.  Then the proposed injection  tracts were anesthetized with 1% lidocaine only at the skin surface using a 1-1/2 inch 25-gauge needle.We then introduced a 25-gauge 2 inch spinal needles under fluoroscopic guidance  at center of the trapezoid of the target facet.  No parasthesia was elicited with needle placement and aspiration was negative for CSF or blood. Then, 0.3 mL 0.25% bupivaciane is injected into the space.     Light pressure was held at the puncture site(s) to prevent ecchymosis and oozing.  The patient's skin was cleansed, and hemostasis was confirmed.  Band-aids were applied to the needle injection site(s).      Condition:    The patient remained awake and alert throughout the procedure.  The patient tolerated the procedure well and was monitored for approximately 15 minutes afterward in the post procedure area.  There were no immediate post procedure complications noted.  The patient was then discharged to home as per protocol.  The patient will follow up in the outpatient clinic in 4 week(s), unless otherwise clinically indicated.    Patient condition  stable.    Pre-procedure pain score: 8/10  Post-procedure pain score: 6/10

## 2020-11-04 ENCOUNTER — OFFICE VISIT (OUTPATIENT)
Dept: INTERNAL MEDICINE | Facility: CLINIC | Age: 70
End: 2020-11-04
Payer: COMMERCIAL

## 2020-11-04 VITALS
WEIGHT: 123 LBS | OXYGEN SATURATION: 98 % | DIASTOLIC BLOOD PRESSURE: 60 MMHG | TEMPERATURE: 97.7 F | BODY MASS INDEX: 46.77 KG/M2 | HEART RATE: 66 BPM | SYSTOLIC BLOOD PRESSURE: 108 MMHG | RESPIRATION RATE: 15 BRPM

## 2020-11-04 DIAGNOSIS — S78.119A AMPUTATION ABOVE KNEE (H): ICD-10-CM

## 2020-11-04 DIAGNOSIS — M25.549 ARTHRALGIA OF HAND, UNSPECIFIED LATERALITY: ICD-10-CM

## 2020-11-04 DIAGNOSIS — R35.0 URINARY FREQUENCY: Primary | ICD-10-CM

## 2020-11-04 LAB
ALBUMIN UR-MCNC: NEGATIVE MG/DL
APPEARANCE UR: CLEAR
BILIRUB UR QL STRIP: NEGATIVE
COLOR UR AUTO: YELLOW
GLUCOSE UR STRIP-MCNC: NEGATIVE MG/DL
HGB BLD-MCNC: 12.7 G/DL (ref 11.7–15.7)
HGB UR QL STRIP: ABNORMAL
KETONES UR STRIP-MCNC: NEGATIVE MG/DL
LEUKOCYTE ESTERASE UR QL STRIP: NEGATIVE
NITRATE UR QL: NEGATIVE
PH UR STRIP: 7 PH (ref 5–7)
RBC #/AREA URNS AUTO: ABNORMAL /HPF
SOURCE: ABNORMAL
SP GR UR STRIP: 1.01 (ref 1–1.03)
UROBILINOGEN UR STRIP-ACNC: 0.2 EU/DL (ref 0.2–1)
WBC #/AREA URNS AUTO: ABNORMAL /HPF

## 2020-11-04 PROCEDURE — 99213 OFFICE O/P EST LOW 20 MIN: CPT | Performed by: INTERNAL MEDICINE

## 2020-11-04 PROCEDURE — 81001 URINALYSIS AUTO W/SCOPE: CPT | Performed by: INTERNAL MEDICINE

## 2020-11-04 PROCEDURE — 85018 HEMOGLOBIN: CPT | Performed by: INTERNAL MEDICINE

## 2020-11-04 PROCEDURE — 36415 COLL VENOUS BLD VENIPUNCTURE: CPT | Performed by: INTERNAL MEDICINE

## 2020-11-04 NOTE — LETTER
[facility]  [address] [address]  [phone]      11/4/2020       Sonya Foote  [address]   [place] [address]        Dear Sonya,    Your hemoglobin is normal.    Your urinalysis also did not demonstrate any obvious evidence of an infection but if you continue to have urinary symptoms please make sure you follow-up with your urologist.    Sincerely,      Allan Casey MD  Internal Medicine

## 2020-11-04 NOTE — PROGRESS NOTES
Subjective     Sonya Foote is a 70 year old female who presents to clinic today for the following health issues:    HPI      Musculoskeletal problem/pain  Sonya states that she has had pain in her hands.  She reports this is being 2 weeks of onset but in close questioning she has had this pain and discomfort for years.  She is used topicals to try and control her discomfort as she has been told she has mild arthritic changes.  She also has had chronic paresthesias and neuropathic changes for years also.    Duration: 2 weeks     Description  Location: bilateral hand    Intensity:  moderate    Accompanying signs and symptoms: numbness, tingling and swelling    History  Previous similar problem: no   Previous evaluation:  none    Precipitating or alleviating factors:  Trauma or overuse: no   Aggravating factors include: cold or damp weather    Therapies tried and outcome: Volteran Gel, helps sometimes      Other concerns:  1. Requesting hgb checked but she wonders as to whether or not she may be bleeding.  She has had some dark stools but has been taking iron.  She denies any other associated symptoms and had a hemoglobin checked about a month ago that was normal.  2. Patient states [facility] has been faxing order to PCP to sign off on TEN's unit for neck pain   3. Requesting recheck of UA, continues to have burning and frequency.  She has seen a urologist on several occasions and subsequently was treated about a month ago for urinary tract infection with ciprofloxacin for which sensitivities were found to be appropriate for a Pseudomonas strain.  She is again requesting testing.    Review of Systems   CONSTITUTIONAL: NEGATIVE for fever, chills, change in weight  EYES: NEGATIVE for vision changes or irritation  ENT/MOUTH: NEGATIVE for ear, mouth and throat problems  RESP: NEGATIVE for significant cough or SOB  CV: NEGATIVE for chest pain, palpitations  GI: NEGATIVE for nausea, abdominal pain, heartburn, or change  in bowel habits  : NEGATIVE for dysuria, or hematuria, + frquency  HEME: NEGATIVE for bleeding problems  PSYCHIATRIC: NEGATIVE for changes in mood or affect      Objective    /60   Pulse 66   Temp 97.7  F (36.5  C) (Temporal)   Resp 15   Wt 55.8 kg (123 lb)   SpO2 98%   BMI 46.77 kg/m    There is no height or weight on file to calculate BMI.  Physical Exam   GENERAL: alert and no distress  EYES: Eyes grossly normal to inspection, PERRL and conjunctivae and sclerae normal  HENT: ear canals and TM's normal, nose and mouth without ulcers or lesions  NECK: no adenopathy, no asymmetry, masses, or scars and thyroid normal to palpation  RESP: lungs clear to auscultation - no rales, rhonchi or wheezes  CV: regular rate and rhythm, normal S1 S2, no S3 or S4, no click or rub, no peripheral edema and peripheral pulses strong  MS: Patient is in a wheelchair with bilateral lower extremity amputations.  SKIN: The hands do not demonstrate any swelling.  There is normal  strength.  There are very mild osteoarthritic changes noted but no acute synovitis or obvious inflammatory or cellulitic change identified.  NEURO: No focal changes normal  PSYCH: mentation appears normal, affect normal/bright        Assessment & Plan     Urinary frequency  We will recheck urinalysis and hemoglobin per patient request.  - Hemoglobin  - *UA reflex to Microscopic and Culture ([place] [facility] (except [place] and [place])    Arthralgia of hand, unspecified laterality  Patient has been treated maximally at this point suggest rheumatology assessment for ongoing symptomatology.  As she is also already on a chronic pain pump  - RHEUMATOLOGY REFERRAL; Future    (Q42.289M) Amputation above knee (H)  Comment: She will need ongoing and continuous use of her motorized wheelchair indefinitely.  Plan:             See Patient Instructions    Return for if symptoms recur or worsen.    Allan Casey MD  [facility]  [facility]

## 2020-11-04 NOTE — LETTER
November 5, 2020      Sonya Foote  [address]   [place] [address]        Dear ,    We are writing to inform you of your test results.  Your urinalysis does not demonstrate any evidence of a urinary tract infection.    However, there is a small amount of blood and a few red blood cells therefore you should be following up with your urologist accordingly.    [facility]   Loki Gordon MD   Phone # [phone]         If you have any questions or concerns, please call [facility] ([phone]),   or your Urologist ([phone]).      Sincerely,        Allan Casey MD  [facility]  Internal Medicine                          Resulted Orders   Hemoglobin   Result Value Ref Range    Hemoglobin 12.7 11.7 - 15.7 g/dL   *UA reflex to Microscopic and Culture ([place] (except [place] and [place])   Result Value Ref Range    Color Urine Yellow     Appearance Urine Clear     Glucose Urine Negative NEG^Negative mg/dL    Bilirubin Urine Negative NEG^Negative    Ketones Urine Negative NEG^Negative mg/dL    Specific Gravity Urine 1.015 1.003 - 1.035    Blood Urine Small (A) NEG^Negative    pH Urine 7.0 5.0 - 7.0 pH    Protein Albumin Urine Negative NEG^Negative mg/dL    Urobilinogen Urine 0.2 0.2 - 1.0 EU/dL    Nitrite Urine Negative NEG^Negative    Leukocyte Esterase Urine Negative NEG^Negative    Source Midstream Urine       Comment:      CORRECTED ON 11/04 AT 1236: PREVIOUSLY REPORTED AS Urine   Urine Microscopic   Result Value Ref Range    WBC Urine 0 - 5 OTO5^0 - 5 /HPF    RBC Urine 2-5 (A) OTO2^O - 2 /HPF

## 2020-11-09 ENCOUNTER — OFFICE VISIT (OUTPATIENT)
Dept: OPHTHALMOLOGY | Facility: CLINIC | Age: 70
End: 2020-11-09
Attending: OPHTHALMOLOGY
Payer: COMMERCIAL

## 2020-11-09 DIAGNOSIS — H40.1133 PRIMARY OPEN ANGLE GLAUCOMA OF BOTH EYES, SEVERE STAGE: Primary | ICD-10-CM

## 2020-11-09 PROCEDURE — 99214 OFFICE O/P EST MOD 30 MIN: CPT | Performed by: OPHTHALMOLOGY

## 2020-11-09 PROCEDURE — G0463 HOSPITAL OUTPT CLINIC VISIT: HCPCS

## 2020-11-09 PROCEDURE — 92133 CPTRZD OPH DX IMG PST SGM ON: CPT | Performed by: OPHTHALMOLOGY

## 2020-11-09 ASSESSMENT — VISUAL ACUITY
METHOD: SNELLEN - LINEAR
OD_CC: 20/150
OS_CC: NLP

## 2020-11-09 ASSESSMENT — REFRACTION_WEARINGRX
OS_CYLINDER: SPHERE
OS_ADD: +3.00
SPECS_TYPE: TRIFOCAL
OS_SPHERE: -1.00
OD_SPHERE: -1.00
OD_CYLINDER: SPHERE
OD_ADD: +3.00

## 2020-11-09 ASSESSMENT — SLIT LAMP EXAM - LIDS
COMMENTS: NORMAL
COMMENTS: NORMAL

## 2020-11-09 ASSESSMENT — CUP TO DISC RATIO
OS_RATIO: 0.9
OD_RATIO: 0.8

## 2020-11-09 ASSESSMENT — EXTERNAL EXAM - LEFT EYE: OS_EXAM: NORMAL

## 2020-11-09 ASSESSMENT — CONF VISUAL FIELD
OS_SUPERIOR_TEMPORAL_RESTRICTION: 1
OD_SUPERIOR_NASAL_RESTRICTION: 3
OS_INFERIOR_TEMPORAL_RESTRICTION: 1
OS_SUPERIOR_NASAL_RESTRICTION: 1
OD_INFERIOR_NASAL_RESTRICTION: 3
OS_INFERIOR_NASAL_RESTRICTION: 1
OD_SUPERIOR_TEMPORAL_RESTRICTION: 1
OD_INFERIOR_TEMPORAL_RESTRICTION: 1

## 2020-11-09 ASSESSMENT — TONOMETRY
OS_IOP_MMHG: 11
OD_IOP_MMHG: 12
IOP_METHOD: TONOPEN

## 2020-11-09 ASSESSMENT — EXTERNAL EXAM - RIGHT EYE: OD_EXAM: NORMAL

## 2020-11-09 NOTE — NURSING NOTE
Chief Complaints and History of Present Illnesses   Patient presents with     Glaucoma Follow Up     1 year follow up glaucoma both eyes.     Chief Complaint(s) and History of Present Illness(es)     Glaucoma Follow Up     Laterality: both eyes    Comments: 1 year follow up glaucoma both eyes.              Comments     Pt states that she broke her glasses 2 weeks ago. Pt waiting for her new glasses to arrive.  Burning eye pain in RE for the past week, limited relief with drops.  More light sensitivity in RE.  DM2 BS: 86 this morning per pt.  Lab Results       Component                Value               Date                       A1C                      5.0                 08/19/2020                 A1C                      5.0                 12/16/2019                 A1C                      5.0                 04/17/2019                 A1C                      5.1                 06/06/2018                 A1C                      4.4                 05/02/2018              GISELE Wells November 9, 2020 8:43 AM

## 2020-11-09 NOTE — PROGRESS NOTES
CC: follow up glaucoma   HPI: Sonya Foote is a  70 year old year-old patient with history of Primary open angle glaucoma (POAG), here for follow up.  Reports no changes in vision, no flashes and floaters     Retinal Imaging:  OCT ONFL   RE: ? Increased STthinning? But tracing not completely reliable  LE:stable   Good intraocular pressure 12/11    Assessment & Plan:    1)POAG/?LTG vs Glc Suspect   -- s/p SLT OD (3/13/15),  -- H/O negative head CT and MRI 2013,2014 for headaches,   - ?retinal vascular event in the right eye in the past.  See below plan   -- K pachy: 539/540     Tmax: OD:23      HVF: OD:[place] [facility] in 2016 (unable to transfer out of chair)       CDR: 0.7/0.9 (pallor OS>OD)      HRT/OCT: Severe RNFL thinning OS>OD         FHX of Glc: Father, grandparents -- lost vision, was on gtts       Asthma/COPD: Yes, on inhalers but tolerating topical and po BB   Steroid Use: No    Kidney Stones: No    Sulfa Allergy: No     Overall exam stable; will continue to monitor ONFL and possible increased thinning ST    2)PCIOL OD   -- vision loss in the right eye predates 2013 (pt believes she lost all vision in 2005) and she had several MRI's in 2013 including one with orbital sequences which did not reveal any structural change to cause a right optic neuropathy.  3)NS OS -- observe as VA is no light perception   4)DM s Diabetic retinopathy  Blood pressure (<120/80) and blood glucose (HbA1c <7.0) control discussed with patient. Patient advised that failure to adequately control each may lead to vision loss. The patient expressed understanding.    5)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  -- ?acute central retinal / ophthalmic artery thromboembolic event   6)H/O CVA  -- She is a severe vasculopath based on past medical history of several central nervous system strokes, coronary artery disease with CHF, severe hypertension, type II diabetes mellitus, and peripheral vascular disease.  7)COLLEEN  Recommend artificial tears   As needed     PLAN  Patient will continue on   - Cosopt (Timolol/Dorzolamide) which is a blue top drop 2x/day (12 hours apart) in the RIGHT EYE ONLY,  - Latanoprost which is a teal top drop at bedtime in both eyes, and   - Alphagan (Brimonidine) which is a purple top drop 2x/day (12 hours apart) in the RIGHT EYE ONLY.   - Patient will return to clinic in 6 months with evaluation, dilated eye exam, OCT with RNFL analysis and IOP check.         ~~~~~~~~~~~~~~~~~~~~~~~~~~~~~~~~~~   Complete documentation of historical and exam elements from today's encounter can be found in the full encounter summary report (not reduplicated in this progress note).  I personally obtained the chief complaint(s) and history of present illness.  I confirmed and edited as necessary the review of systems, past medical/surgical history, family history, social history, and examination findings as documented by others; and I examined the patient myself.  I personally reviewed the relevant tests, images, and reports as documented above.  I formulated and edited as necessary the assessment and plan and discussed the findings and management plan with the patient and family    Mel Burnett MD   of Ophthalmology.  Retina Service   Department of Ophthalmology and Visual Neurosciences   [facility]  Phone: [phone]   Fax: [phone]

## 2020-11-12 ENCOUNTER — HOSPITAL ENCOUNTER (OUTPATIENT)
Dept: PHYSICAL THERAPY | Facility: CLINIC | Age: 70
Setting detail: THERAPIES SERIES
End: 2020-11-12
Attending: INTERNAL MEDICINE
Payer: COMMERCIAL

## 2020-11-12 PROCEDURE — 97530 THERAPEUTIC ACTIVITIES: CPT | Mod: GP | Performed by: PHYSICAL THERAPIST

## 2020-11-16 ENCOUNTER — TELEPHONE (OUTPATIENT)
Dept: INTERNAL MEDICINE | Facility: CLINIC | Age: 70
End: 2020-11-16

## 2020-11-16 NOTE — TELEPHONE ENCOUNTER
Fax received from [employer] requesting chart notes from her most recent visit documenting her continued need and use of a power wheelchair or a letter of medical necessity so that patient can have repairs done to chair. Please addend 11/4/20 visit to include this or provider a letter of medical necessity.

## 2020-11-17 DIAGNOSIS — K59.00 CONSTIPATION, UNSPECIFIED CONSTIPATION TYPE: ICD-10-CM

## 2020-11-17 RX ORDER — LACTULOSE 10 G/15ML
SOLUTION ORAL
Qty: 473 ML | Refills: 1 | Status: SHIPPED | OUTPATIENT
Start: 2020-11-17 | End: 2021-11-01

## 2020-11-17 NOTE — PROGRESS NOTES
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  neurogenic bladder, L carotid stent 2006, copd.    Surgical/Medical history reviewed Yes   Pertinent history of current problem (include personal factors and/or comorbidities that impact the POC) AP is her prosthetist.  was in [facility].  got bilat prostheses.  has a sliding board.  needs UE strength.     Pertinent Visual History  poor vision, uses white cane while using pwr w/c   Prior level of functional mobility Transfers   Transfers sba   Current Community Support Personal care attendant   Patient role/Employment history Disabled   Living environment Assisted living   Home/Community Accessibility Comments accessible   Current Assistive Devices Power Wheel Chair   Patient/Family Goals Statement learn how to walk/ use the new prostheses.  has a walker.  lives in A living.  will be getting an ILS worker.  gets help w/ bath, bed, meds.  dressing.  meals.     General Information Comments 2 months ago had pump surgery, moved it to stomach.  has worn prostheses for hours at a time.  smoker.  seeing [facility]  [phone]   Fall Risk Screen   Fall screen completed by PT   Have you fallen 2 or more times in the past year? No   Have you fallen and had an injury in the past year? No   Pain   Patient currently in pain Yes   Pain location neck   Pain rating lower back pain pump w/ remote control   Pain comments has fibro and arthritis.  will have neck injections soon.  phantom pain, takes lyrica.     Vitals Signs   Heart Rate 66   Blood Pressure 124/59   Cognitive Status Examination   Orientation orientation to person, place and time   Level of Consciousness alert   Follows Commands and Answers Questions 100% of the time   Personal  Safety and Judgment intact   Memory intact   Integumentary   Integumentary Comments wfls   Posture   Posture Forward head position   Range of Motion (ROM)   ROM Comment hip ext to neutral.   Strength   Strength Comments wfls   Bed Mobility   Bed Mobility Comments slow   Transfer Skills   Transfer Comments sba, slide tx w/ or w/out board to mat, does not wt bear into prostheses.    Locomotion   Wheel Chair Mobility Comments pwr w/c w/ use of white cane for low vision   Gait   Gait Comments unable at this time, stands x 1 min w/ cga and bilat prostheses   Balance   Balance Comments req use of //bars and cga   Sensory Examination   Sensory Perception other (describe)   Sensory Perception Comments states fingertips are numb   Coordination   Coordination other (see comments)   Coordination Comments sl slow w/ UE tasks due to numbness fingers bilat   Muscle Tone   Muscle Tone no deficits were identified   Planned Therapy Interventions   Planned Therapy Interventions balance training;gait training;neuromuscular re-education;prosthetic fitting/training;strengthening;transfer training   Clinical Impression   Criteria for Skilled Therapeutic Interventions Met yes, treatment indicated   PT Diagnosis bilat AKA w/ immobility   Influenced by the following impairments decreased strength, pain, imbalance, fingers numb   Functional limitations due to impairments transfers - potential for falls, unable to walk at this time   Clinical Presentation Stable/Uncomplicated   Clinical Decision Making (Complexity) Low complexity   Therapy Frequency other (see comments)   Predicted Duration of Therapy Intervention (days/wks) up to 10x in 90 days   Risk & Benefits of therapy have been explained Yes   Patient, Family & other staff in agreement with plan of care Yes   Clinical Impression Comments 71 yo w/ bilat AKA, her goal is to walk, will need assistance per PT judgment   Education Assessment   Preferred Learning Style Demonstration;Listening    Barriers to Learning Physical;Visual   GOALS   PT Eval Goals 1;2;3;4   Goal 1   Goal Identifier transfers   Goal Description safe tx w/ use of bilat prostheses indep   Target Date 01/22/21   Goal 2   Goal Identifier standing   Goal Description pt to be able to stand at walker or kitchen counter indep x 2 min w/ prostheses on.    Target Date 01/22/21   Goal 3   Goal Identifier gait   Goal Description pt to walk 20ft w/ bilat prostheses, walker and min assist   Target Date 01/22/21   Goal 4   Goal Identifier HEP   Goal Description pt to be indep w / a HEP for overall wellness.   Target Date 01/22/21   Total Evaluation Time   PT Eval, Low Complexity Minutes (67067) 34

## 2020-11-18 ENCOUNTER — DOCUMENTATION ONLY (OUTPATIENT)
Dept: ANESTHESIOLOGY | Facility: CLINIC | Age: 70
End: 2020-11-18

## 2020-11-18 NOTE — PROGRESS NOTES
Purpose of call: Follow up after Left Cervical 3, 4, and 5 Medial Branch Block   Date of service: 10/30/20  Spoke with: Pain Diary     Location of pain: Neck   Percentage of pain relief after procedure: 0%

## 2020-11-19 ENCOUNTER — TELEPHONE (OUTPATIENT)
Dept: SLEEP MEDICINE | Facility: CLINIC | Age: 70
End: 2020-11-19

## 2020-11-19 ENCOUNTER — HOSPITAL ENCOUNTER (OUTPATIENT)
Dept: PHYSICAL THERAPY | Facility: CLINIC | Age: 70
Setting detail: THERAPIES SERIES
End: 2020-11-19
Attending: INTERNAL MEDICINE
Payer: COMMERCIAL

## 2020-11-19 DIAGNOSIS — G47.33 OSA (OBSTRUCTIVE SLEEP APNEA): ICD-10-CM

## 2020-11-19 DIAGNOSIS — G47.39 COMPLEX SLEEP APNEA SYNDROME: ICD-10-CM

## 2020-11-19 DIAGNOSIS — F51.04 CHRONIC INSOMNIA: ICD-10-CM

## 2020-11-19 PROCEDURE — 97530 THERAPEUTIC ACTIVITIES: CPT | Mod: GP | Performed by: PHYSICAL THERAPIST

## 2020-11-19 NOTE — TELEPHONE ENCOUNTER
Pt. Was confused and said she did her COVID testing last Saturday the 14th of November.  I checked her chart and called her back to inform her that she is scheduled for this Saturday the 21st of November.  She is coordinating a ride to complete her testing prior to her Sleep Study on the 24th.

## 2020-11-21 DIAGNOSIS — Z20.822 COVID-19 RULED OUT: ICD-10-CM

## 2020-11-21 PROCEDURE — 99000 SPECIMEN HANDLING OFFICE-LAB: CPT | Performed by: PATHOLOGY

## 2020-11-21 PROCEDURE — U0003 INFECTIOUS AGENT DETECTION BY NUCLEIC ACID (DNA OR RNA); SEVERE ACUTE RESPIRATORY SYNDROME CORONAVIRUS 2 (SARS-COV-2) (CORONAVIRUS DISEASE [COVID-19]), AMPLIFIED PROBE TECHNIQUE, MAKING USE OF HIGH THROUGHPUT TECHNOLOGIES AS DESCRIBED BY CMS-2020-01-R: HCPCS | Mod: 90 | Performed by: PATHOLOGY

## 2020-11-22 LAB
SARS-COV-2 RNA SPEC QL NAA+PROBE: NOT DETECTED
SPECIMEN SOURCE: NORMAL

## 2020-11-25 DIAGNOSIS — Z72.0 CURRENT TOBACCO USE: ICD-10-CM

## 2020-11-25 RX ORDER — NICOTINE 21 MG/24HR
PATCH, TRANSDERMAL 24 HOURS TRANSDERMAL
Qty: 28 PATCH | Refills: 3 | Status: SHIPPED | OUTPATIENT
Start: 2020-11-25

## 2020-11-27 NOTE — PROGRESS NOTES
LPN called and left VM for the pt,   They are going to require a follow up appointment.   LPN asked that the pt call the clinic back when able, for assistance with scheduling.  Mary Singer LPN

## 2020-12-01 ENCOUNTER — TELEPHONE (OUTPATIENT)
Dept: INTERNAL MEDICINE | Facility: CLINIC | Age: 70
End: 2020-12-01

## 2020-12-01 NOTE — TELEPHONE ENCOUNTER
Ofelia, [phone] from Pt's [facility] is calling to report a fall, -- this happened during the night. Pt says that she was sleeping, and rolled out of bed, landed on the floor, and then pulled herself back into bed. Staff & pt, thinking it may have been a dream (they are not sure) because she is not strong enough to pull herself up if she were to fall.   She has no bruising, no injuries.  She has had similar fallinh episodes in the past (may have recurring dreams at night that she is falling out of bed).

## 2020-12-02 DIAGNOSIS — T78.2XXS ANAPHYLAXIS, SEQUELA: Primary | ICD-10-CM

## 2020-12-03 RX ORDER — EPINEPHRINE 0.3 MG/.3ML
INJECTION SUBCUTANEOUS
Qty: 2 EACH | Refills: 1 | Status: SHIPPED | OUTPATIENT
Start: 2020-12-03

## 2020-12-07 ENCOUNTER — MEDICAL CORRESPONDENCE (OUTPATIENT)
Dept: HEALTH INFORMATION MANAGEMENT | Facility: CLINIC | Age: 70
End: 2020-12-07

## 2020-12-07 ENCOUNTER — TELEPHONE (OUTPATIENT)
Dept: UROLOGY | Facility: CLINIC | Age: 70
End: 2020-12-07

## 2020-12-07 NOTE — TELEPHONE ENCOUNTER
Patient called and had botox in may and wants to try it again. Need orders  Having a lot of pain in her bladder having bladder spasms I donot see any medication for this.  She had uauc 2 weeks ago negative  She has sptube . Ludy Ernst LPN Staff Nurse

## 2020-12-07 NOTE — TELEPHONE ENCOUNTER
[facility] Call Center    Phone Message    May a detailed message be left on voicemail: yes     Reason for Call: Symptoms or Concerns     If patient has red-flag symptoms, warm transfer to triage line    Current symptom or concern: Pain, leakage, and bladder spasms    Symptoms have been present for:  6 day(s)    Has patient previously been seen for this? No    By :     Date:     Are there any new or worsening symptoms? No      Action Taken: Other: [facility]    Travel Screening: Not Applicable

## 2020-12-08 NOTE — TELEPHONE ENCOUNTER
[facility] Call Center    Phone Message    May a detailed message be left on voicemail: yes     Reason for Call: Other: Pt following up on message that was sent over yesterday, 12/7. Pt is requesting another call back to discuss symptoms/medicaiton. Thank you     Action Taken: Message routed to:  [facility]    Travel Screening: Not Applicable

## 2020-12-09 ENCOUNTER — TELEPHONE (OUTPATIENT)
Dept: UROLOGY | Facility: CLINIC | Age: 70
End: 2020-12-09

## 2020-12-09 ENCOUNTER — TRANSFERRED RECORDS (OUTPATIENT)
Dept: HEALTH INFORMATION MANAGEMENT | Facility: CLINIC | Age: 70
End: 2020-12-09

## 2020-12-09 ENCOUNTER — RECORDS - HEALTHEAST (OUTPATIENT)
Dept: LAB | Facility: CLINIC | Age: 70
End: 2020-12-09

## 2020-12-09 ENCOUNTER — PRE VISIT (OUTPATIENT)
Dept: UROLOGY | Facility: CLINIC | Age: 70
End: 2020-12-09

## 2020-12-09 DIAGNOSIS — N31.9 NEUROGENIC BLADDER: Primary | ICD-10-CM

## 2020-12-09 DIAGNOSIS — N39.0 URINARY TRACT INFECTION: Primary | ICD-10-CM

## 2020-12-09 LAB
ALBUMIN UR-MCNC: NEGATIVE MG/DL
AMORPH CRY #/AREA URNS HPF: ABNORMAL /[HPF]
APPEARANCE UR: CLEAR
BACTERIA #/AREA URNS HPF: ABNORMAL HPF
BILIRUB UR QL STRIP: NEGATIVE
COLOR UR AUTO: ABNORMAL
GLUCOSE UR STRIP-MCNC: NEGATIVE MG/DL
HGB UR QL STRIP: ABNORMAL
KETONES UR STRIP-MCNC: NEGATIVE MG/DL
LEUKOCYTE ESTERASE UR QL STRIP: ABNORMAL
NITRATE UR QL: POSITIVE
PH UR STRIP: 6.5 [PH] (ref 4.5–8)
RBC #/AREA URNS AUTO: ABNORMAL HPF
SP GR UR STRIP: 1 (ref 1–1.03)
SQUAMOUS #/AREA URNS AUTO: ABNORMAL LPF
UROBILINOGEN UR STRIP-ACNC: ABNORMAL
WBC #/AREA URNS AUTO: ABNORMAL HPF

## 2020-12-09 RX ORDER — CIPROFLOXACIN 2 MG/ML
400 INJECTION, SOLUTION INTRAVENOUS
Status: CANCELLED | OUTPATIENT
Start: 2020-12-09

## 2020-12-09 NOTE — TELEPHONE ENCOUNTER
Patient called does not appear over the phone in horrible pain but certainly dealing with a lot discomfort with her sp tube and bladder spasms  Moved her appointment up to next Monday with chuck to discuss botox or any treatment to help with her bladder spasms.  Ordered uauc and called her nursing home to get fax number and let them know Ludy Ernst LPN Staff Nurse  nursinghome number is [phone] Ludy Ernst LPN Staff Nurse

## 2020-12-09 NOTE — TELEPHONE ENCOUNTER
Reason for visit: Pre surgical annual follow up     Relevant information: bladder botox    Records/imaging/labs/orders: all records available    Pt called: no need for a call

## 2020-12-09 NOTE — TELEPHONE ENCOUNTER
[facility] Call Center    Phone Message    May a detailed message be left on voicemail: no     Reason for Call: Other: Fax # [phone]     Action Taken: Message routed to:  [facility] Urology    Travel Screening: Not Applicable

## 2020-12-09 NOTE — TELEPHONE ENCOUNTER
Patient is scheduled for surgery with Dr. Gordon    Left voice mail with surgery information and phone number for patient to call back at [phone]    Date of Surgery: 1/7/21    Location: [place] OR    Informed patient they will need an adult  yes    H&P: Scheduled with PCP    Additional imaging/appointments: Covid 1/5/21    Surgery packet: to be mailed by 12/10/20     Additional comments: N/A

## 2020-12-09 NOTE — TELEPHONE ENCOUNTER
[facility] Call [facility]    Phone Message    May a detailed message be left on voicemail: no     Reason for Call: Other: Pain level 10 and she can't sleep.  Please call Sonya at: [phone]      Comment: Urgent request    Action Taken: Message routed to:  [facility] Urology    Travel Screening: Not Applicable

## 2020-12-10 DIAGNOSIS — H40.1133 PRIMARY OPEN ANGLE GLAUCOMA OF BOTH EYES, SEVERE STAGE: ICD-10-CM

## 2020-12-10 DIAGNOSIS — Z11.59 ENCOUNTER FOR SCREENING FOR OTHER VIRAL DISEASES: Primary | ICD-10-CM

## 2020-12-10 RX ORDER — DORZOLAMIDE HCL 20 MG/ML
SOLUTION/ DROPS OPHTHALMIC
Qty: 10 ML | Refills: 97 | OUTPATIENT
Start: 2020-12-10

## 2020-12-11 ENCOUNTER — TELEPHONE (OUTPATIENT)
Dept: OPHTHALMOLOGY | Facility: CLINIC | Age: 70
End: 2020-12-11

## 2020-12-11 ENCOUNTER — TELEPHONE (OUTPATIENT)
Dept: UROLOGY | Facility: CLINIC | Age: 70
End: 2020-12-11

## 2020-12-11 DIAGNOSIS — H40.1133 PRIMARY OPEN ANGLE GLAUCOMA OF BOTH EYES, SEVERE STAGE: ICD-10-CM

## 2020-12-11 NOTE — TELEPHONE ENCOUNTER
[facility]    Phone Message    May a detailed message be left on voicemail: yes     Reason for Call: Other: Nurse from [facility] calling returning Ludy's call. Please call back.     Action Taken: Message routed to:  [facility] urology    Travel Screening: Not Applicable

## 2020-12-11 NOTE — TELEPHONE ENCOUNTER
----- Message from Haily Ruiz MD sent at 12/11/2020  2:47 PM CST -----  Regarding: RE:   Shravan ruiz,    If she is in a lot of pain, she needs to be seen in an ER. She could be in clot retention.  ----- Message -----  From: Ludy Ernst LPN  Sent: 12/11/2020   2:06 PM CST  To: Haily Ruiz MD    She called with bladder pain a lot of blood and clots and we sent both ua and uc orders to her nursing facility they only received  Ludy Ernst LPN Staff Nurse

## 2020-12-11 NOTE — TELEPHONE ENCOUNTER
Spoke with nurse paredes she states her urine is clear and [place] today no blood no clots .Ludy Ernst LPN Staff Nurse

## 2020-12-11 NOTE — TELEPHONE ENCOUNTER
[facility] Call [facility]    Phone Message    May a detailed message be left on voicemail: yes     Reason for Call: Other: Betzy from nursing home reports that they got the order for UA, and faxed results over to us yesterday. Waiting to hear back from  about an antibiotic. Please call back to discuss.     Action Taken: Message routed to:  [facility] urology    Travel Screening: Not Applicable

## 2020-12-11 NOTE — TELEPHONE ENCOUNTER
[facility] Call [facility]    Phone Message    May a detailed message be left on voicemail: yes     Reason for Call: Medication Refill Request    Has the patient contacted the pharmacy for the refill? Yes   Name of medication being requested: dorzolamide (TRUSOPT) 2 % ophthalmic solution   Provider who prescribed the medication: Grisel Thurman MD  Pharmacy: [address]  Date medication is needed: ASAP     The Pts nurse Tiffanie THAKKAR called stating that the Pt is completely out of the Rx listed above and would like this to be refilled. Please advise, thank you!      Action Taken: Message routed to:  [facility] EYE    Travel Screening: Not Applicable

## 2020-12-11 NOTE — TELEPHONE ENCOUNTER
Nurse went and looked at her sp tube and bag and iurine is perfectly clear.  They did not get the orders to do uc   She will not go to ed for bladder clot evacuation. Ludy Ernst LPN Staff Nurse

## 2020-12-11 NOTE — TELEPHONE ENCOUNTER
singh  Did you take the message with orders to irrigate his nephrostomy tube?? And then the fax number came to send it to his facility please Ludy Ernst LPN Staff Nurse     No

## 2020-12-11 NOTE — TELEPHONE ENCOUNTER
Hi Ludy,    I did not take that message. I only received the message you sent asking me to fax the UA/UC to Betzy on 12/9/2020.    Raffy De Leon MA

## 2020-12-14 ENCOUNTER — VIRTUAL VISIT (OUTPATIENT)
Dept: UROLOGY | Facility: CLINIC | Age: 70
End: 2020-12-14
Payer: COMMERCIAL

## 2020-12-14 DIAGNOSIS — K59.00 CONSTIPATION, UNSPECIFIED CONSTIPATION TYPE: ICD-10-CM

## 2020-12-14 DIAGNOSIS — N32.89 BLADDER SPASMS: Primary | ICD-10-CM

## 2020-12-14 DIAGNOSIS — M19.041 PRIMARY OSTEOARTHRITIS OF BOTH HANDS: ICD-10-CM

## 2020-12-14 DIAGNOSIS — M19.042 PRIMARY OSTEOARTHRITIS OF BOTH HANDS: ICD-10-CM

## 2020-12-14 PROCEDURE — 99213 OFFICE O/P EST LOW 20 MIN: CPT | Mod: TEL | Performed by: UROLOGY

## 2020-12-14 RX ORDER — DORZOLAMIDE HCL 20 MG/ML
1 SOLUTION/ DROPS OPHTHALMIC 2 TIMES DAILY
Qty: 10 ML | Refills: 1 | Status: CANCELLED | OUTPATIENT
Start: 2020-12-14

## 2020-12-14 ASSESSMENT — PAIN SCALES - GENERAL: PAINLEVEL: EXTREME PAIN (8)

## 2020-12-14 NOTE — NURSING NOTE
Chief Complaint   Patient presents with     RECHECK     Discuss Botox       not currently breastfeeding. There is no height or weight on file to calculate BMI.    Patient Active Problem List   Diagnosis     Chronic pain syndrome     Morbid obesity (H)     History of blood transfusion     History of central retinal artery occlusion     COPD (chronic obstructive pulmonary disease) (H)     Type 2 diabetes mellitus (H)     Benign essential hypertension     History of stroke     Sickle cell trait (H)     MDD (major depressive disorder)     Seizure disorder (H)     Chronic back pain     Chronic neck pain     ROGER (generalized anxiety disorder)     Person who has had sex change operation     Osteoporosis     PAD (peripheral artery disease) (H)     Peripheral neuropathy     JOSE (obstructive sleep apnea)     Amputation above knee (H)     Blind left eye     Neurogenic bladder     Acid reflux disease     Hx of carotid artery stenosis     Complex sleep apnea syndrome     Dysphagia     CAD (coronary artery disease)     Chest pain on breathing     Chronic insomnia     Arthropathy of cervical facet joint       Allergies   Allergen Reactions     Bee Venom Anaphylaxis     Penicillins Anaphylaxis     Dilantin [Phenytoin] Other (See Comments)     Generic dilantin only per pt     Iodine Hives     Novocaine [Procaine] Hives     Tositumomab Unknown       Current Outpatient Medications   Medication Sig Dispense Refill     ADVAIR DISKUS 250-50 MCG/DOSE inhaler INHALE 1 PUFF BY MOUTH TWICE DAILY 1 Inhaler 5     albuterol (PROAIR HFA/PROVENTIL HFA/VENTOLIN HFA) 108 (90 Base) MCG/ACT inhaler Inhale 2 puffs into the lungs every 6 hours 1 Inhaler 5     albuterol (PROVENTIL) (2.5 MG/3ML) 0.083% neb solution Take 1 vial (2.5 mg) by nebulization every 6 hours as needed for shortness of breath / dyspnea or wheezing 180 mL 3     alendronate (FOSAMAX) 70 MG tablet TAKE ONE TABLET BY MOUTH EVERY 7 DAYS (TAKE WITH 8OZ OF WATER 30 MINUTES BEFORE  BREAKFAST AND REMAIN UPRIGHT DURING THIS TIME) 4 tablet 97     ASPERCREME LIDOCAINE 4 % Patch APPLY 1 PATCH TOPICALLY TO LOWER BACK ONCE DAILY (ON FOR 12 HOURS, OFF FOR 12 HOURS) 30 patch 10     ASPIRIN LOW DOSE 81 MG chewable tablet CHEW AND SWALLOW ONE TABLET BY MOUTH IN THE MORNING 28 tablet 10     atorvastatin (LIPITOR) 40 MG tablet TAKE 1 TABLET BY MOUTH AT BEDTIME 28 tablet 10     blood glucose monitoring (ONE TOUCH ULTRA 2) meter device kit Use to test blood sugars 3 times daily or as directed. 1 kit 0     blood glucose monitoring (ONE TOUCH ULTRA) test strip Use to test blood sugars 3 times daily or as directed. 3 Box 3     blood glucose monitoring (ONE TOUCH ULTRASOFT) lancets Use to test blood sugar 3 times daily or as directed. 100 each PRN     brimonidine (ALPHAGAN) 0.2 % ophthalmic solution Place 1 drop into the right eye 2 times daily ( 12 hours apart).    Please keep 11-16-20 appt for refills. 5 mL 4     capsaicin-menthol-methyl sal 0.025-1-12 % external cream Apply 1 Application topically daily as needed (topical pain) 56.6 g 1     diclofenac (VOLTAREN) 1 % topical gel APPLY 2GM TOPICALLY TO AFFECTED AREA(S) FOUR TIMES A  g 3     dorzolamide (TRUSOPT) 2 % ophthalmic solution Place 1 drop into the right eye 2 times daily 1 Bottle 1     EPINEPHrine (ANY BX GENERIC EQUIV) 0.3 MG/0.3ML injection 2-pack INJECT 0.3 ML INTO THE MUSCLE AS NEEDED FOR ANAPHYLAXIS 2 each 1     escitalopram (LEXAPRO) 10 MG tablet TAKE 1 TABLET BY MOUTH ONCE DAILY 28 tablet 10     FEROSUL 325 (65 Fe) MG tablet TAKE 1 TABLET BY MOUTH TWICE DAILY WITH MEALS 56 tablet 10     fluticasone (FLONASE) 50 MCG/ACT nasal spray SPRAY 2 SPRAYS IN EACH NOSTRIL DAILY 16 g 11     INCRUSE ELLIPTA 62.5 MCG/INH Inhaler INHALE 1 PUFF BY MOUTH ONCE DAILY 30 each 10     lactulose (CHRONULAC) 10 GM/15ML solution TAKE 30ML (20MG) BY MOUTH AS NEEDED FOR CONSTIPATION 473 mL 1     latanoprost (XALATAN) 0.005 % ophthalmic solution INSTILL ONE DROP IN  EACH EYE AT BEDTIME 2.5 mL 10     levETIRAcetam (KEPPRA) 500 MG tablet TAKE 1 TABLET BY MOUTH TWICE DAILY 56 tablet 11     lisinopril (ZESTRIL) 20 MG tablet TAKE 1 TABLET BY MOUTH EVERY MORNING 28 tablet 11     loratadine (CLARITIN) 10 MG tablet TAKE 1 TABLET BY MOUTH ONCE DAILY 28 tablet 11     Menthol, Topical Analgesic, 5 % GEL Externally apply topically daily as needed 113 g 0     metFORMIN (GLUCOPHAGE) 500 MG tablet TAKE 1 TABLET BY MOUTH EVERY EVENING WITH DINNER 28 tablet 11     metoprolol succinate ER (TOPROL-XL) 50 MG 24 hr tablet TAKE 1 TABLET BY MOUTH EVERY MORNING 30 tablet 10     Multiple Vitamins-Minerals (TAB-A-MACY) TABS TAKE 1 TABLET BY MOUTH ONCE DAILY 28 tablet 11     multivitamin, therapeutic (THERA-VIT) TABS tablet Take 1 tablet by mouth daily       nicotine (NICODERM CQ) 14 MG/24HR 24 hr patch APPLY 1 PATCH TOPICALLY DAILY ( EVERY 24 HOURS ) 28 patch 3     nitroGLYcerin (NITROSTAT) 0.4 MG sublingual tablet DISSOLVE ONE TABLET UNDER TONGUE AS NEEDED FOR CHEST PAIN MAY REPEAT EVERY 5 MINUTES FOR 3 DOSES, IF SYMPTOMS PERSIST CALL 911 25 tablet 3     Nutritional Supplements (ENSURE NUTRITION SHAKE) LIQD Take 1 Bottle by mouth 2 times daily With meals 60 Bottle 0     pantoprazole (PROTONIX) 40 MG EC tablet TAKE 1 TABLET BY MOUTH EVERY MORNING 28 tablet 11     phenytoin (DILANTIN) 100 MG capsule TAKE 2 CAPS (200MG) BY MOUTH TWICE A  capsule 11     phenytoin sodium extended (DILANTIN) 200 MG capsule TAKE 1 CAPSULE BY MOUTH TWICE DAILY 56 capsule 11     polyethylene glycol (MIRALAX) 17 GM/SCOOP powder MIX 17GM OF POWDER IN 8OZ OF WATER UNTIL COMPLETELY DISSOLVED. DRINK SOLUTION BY MOUTH IN THE MORNING 510 g 8     pregabalin (LYRICA) 150 MG capsule TAKE 1 CAPSULE BY MOUTH THREE TIMES DAILY 90 capsule 4     risperiDONE (RISPERDAL) 0.5 MG tablet TAKE 1 TABLET BY MOUTH AT BEDTIME 28 tablet 4     SENEXON-S 8.6-50 MG tablet TAKE 1 TABLET BY MOUTH TWICE DAILY 56 tablet 10     sennosides (SENOKOT) 8.6  MG tablet Take 1 tablet by mouth 2 times daily        solifenacin (VESICARE) 10 MG tablet TAKE 1 TABLET BY MOUTH EVERY MORNING 28 tablet 10     traZODone (DESYREL) 150 MG tablet TAKE 1 TABLET BY MOUTH AT BEDTIME 28 tablet 10     vitamin D3 (CHOLECALCIFEROL) 10 MCG (400 UNIT) capsule Take 2 capsules by mouth daily       nicotine (COMMIT) 2 MG lozenge Place 1 lozenge (2 mg) inside cheek every hour as needed for smoking cessation (Patient not taking: Reported on 12/14/2020) 60 lozenge 11     Vitamin D3 (VITAMIN D) 10 MCG (400 UNIT) tablet TAKE TWO TABLETS (800 UNITS) BY MOUTH ONCE DAILY 56 tablet 11       Social History     Tobacco Use     Smoking status: Current Every Day Smoker     Packs/day: 1.00     Years: 30.00     Pack years: 30.00     Types: Cigarettes, Cigars     Smokeless tobacco: Never Used     Tobacco comment: 1.5 packs per day    Substance Use Topics     Alcohol use: No     Alcohol/week: 0.0 standard drinks     Drug use: No       Ara Marte CMA  12/14/2020  12:11 PM

## 2020-12-14 NOTE — TELEPHONE ENCOUNTER
"   dorzolamide (TRUSOPT) 2 % ophthalmic solution  Last Written Prescription Date:  8/14/20  Last Fill Quantity: 1 bottle,   # refills: 1  Last Office Visit : 11/9/20  Future Office visit:  5/10/21    \"Cosopt (Timolol/Dorzolamide) which is a blue top drop 2x/day (12 hours apart) in the RIGHT EYE ONLY\"      Routed because: per note cosopt ,trusopt requested please verify they are same med. Request per pt call.  "

## 2020-12-14 NOTE — LETTER
12/14/2020       RE: Sonya Foote  [address]  [place] [address]     Dear Colleague,    Thank you for referring your patient, Sonya Foote, to the [facility] [place] at [facility]. Please see a copy of my visit note below.    HPI:  Sonya Foote is a 70 year old with chronic pain syndrome, OAB and incontinence who is managed with SPT. She gets bladder spasms and pain which did not respond to Botox injection -- she got about a month of relief, at most, from the October injection. She had originally seen Dr Oliver in 2018 who found her to be a poor candidate for oral antimuscarinics due to PVD and glaucoma.     She is asking for pain meds today for the bladder pain.     Past Medical History:   Diagnosis Date     Acid reflux disease      Amputation above knee (H)     bilateral     Benign essential hypertension      Blind left eye     due to central retinal artery occlusion     CAD (coronary artery disease)     UA and inferior MI in 2016 s/p PCI     Chronic back pain      Chronic neck pain      Chronic pain syndrome     with fentanyl intrathecal pain pump     Complex sleep apnea syndrome      COPD (chronic obstructive pulmonary disease) (H)      Dysphagia     chronic due to esophageal strictures and multiple previous dilitations      ROGER (generalized anxiety disorder)      History of blood transfusion     x5; no adverse reactions     History of central retinal artery occlusion 2006    left-sided     History of stroke     residual left-sided weakness     Hx of carotid artery stenosis     s/p left carotid stent in 2006 and balloon angioplasty in 2016     MDD (major depressive disorder)      Neurogenic bladder     SPT in place     JOSE (obstructive sleep apnea)      Osteoporosis      PAD (peripheral artery disease) (H)      Peripheral neuropathy      Person who has had sex change operation     male to female     Seizure disorder (H)     many years  since last grand mal; daily, brief petit mals     Sickle cell trait (H)      Type 2 diabetes mellitus (H)      Past Surgical History:   Procedure Laterality Date     AMPUTATE LEG ABOVE KNEE Left 6/11/2016    Procedure: AMPUTATE LEG ABOVE KNEE;  Surgeon: Mello Rodriguez MD;  [facility]     AMPUTATE LEG BELOW KNEE Right 11/7/2016    Procedure: AMPUTATE LEG BELOW KNEE;  Surgeon: Savannah Durant MD;  [facility]     AMPUTATE REVISION STUMP LOWER EXTREMITY Right 11/11/2016    Procedure: AMPUTATE REVISION STUMP LOWER EXTREMITY;  Surgeon: Savannah Durant MD;  [facility]     AMPUTATE REVISION STUMP LOWER EXTREMITY Right 11/16/2016    Procedure: AMPUTATE REVISION STUMP LOWER EXTREMITY;  Surgeon: Savannah Durant MD;  [facility]     AMPUTATE TOE(S) Right 1/5/2016    Procedure: AMPUTATE TOE(S);  Surgeon: Mello Gaines DPM;  Location: [facility]     ANGIOGRAM Bilateral 11/21/2014    Procedure: ANGIOGRAM;  Surgeon: Savannah Durant MD;  [facility]     ANGIOGRAM Left 1/16/2015    Procedure: ANGIOGRAM;  Surgeon: Savannah Durant MD;  [facility]     ANGIOGRAM Bilateral 9/14/2015    Procedure: ANGIOGRAM;  Surgeon: Savannah Durant MD;  [facility]     ANGIOGRAM Left 10/12/2015    Procedure: ANGIOGRAM;  Surgeon: Savannah Durant MD;  [facility]     ANGIOGRAM Right 6/6/2016    Procedure: ANGIOGRAM;  Surgeon: Savannah Durant MD;  [facility]     ANGIOPLASTY Right 6/6/2016    Procedure: ANGIOPLASTY;  Surgeon: Savannah Durant MD;  [facility]     APPENDECTOMY       BREAST BIOPSY, RT/LT Right     benign     CATARACT IOL, RT/LT Right      CHOLECYSTECTOMY       COLONOSCOPY N/A 8/25/2014    Procedure: COLONOSCOPY;  Surgeon: Mello Ferrer MD;  [facility]     COLONOSCOPY WITH CO2 INSUFFLATION N/A 8/20/2014    Procedure: COLONOSCOPY WITH CO2 INSUFFLATION;  Surgeon: Duane, William Charles, MD;  [facility]     CYSTOSCOPY, INJECT BOTOX N/A 5/21/2020    Procedure: CYSTOSCOPY, WITH BOTULINUM TOXIN INJECTION;   Surgeon: Loki Gordon MD;  [facility]     CYSTOSCOPY, INJECT BOTOX N/A 10/8/2020    Procedure: CYSTOSCOPY, INJECT BOTOX INTO BLADDER, SUPRAPUBIC TUBE EXCHNAGE;  Surgeon: Loki Gordno MD;  [facility]     CYSTOSCOPY, INTRAVESICAL INJECTION N/A 10/31/2019    Procedure: CYSTOSCOPY, BOTOX INJECTION, Suprapubic Catheter Exchange;  Surgeon: Loki Gordon MD;  [facility]     CYSTOSTOMY, INSERT TUBE SUPRAPUBIC, COMBINED N/A 1/16/2018    Procedure: COMBINED CYSTOSTOMY, INSERT TUBE SUPRAPUBIC;  Cystoscopy, Intraoperative Ultrasound, Suprapubic Tube Placement;  Surgeon: Keanu Dawson MD;  [facility]     ENDARTERECTOMY FEMORAL  5/23/2014    Procedure: ENDARTERECTOMY FEMORAL;  Surgeon: Jason Joshi MD;  [facility]     ESOPHAGOSCOPY, GASTROSCOPY, DUODENOSCOPY (EGD), COMBINED  12/14/2012    Procedure: COMBINED ESOPHAGOSCOPY, GASTROSCOPY, DUODENOSCOPY (EGD), BIOPSY SINGLE OR MULTIPLE;  ESOPHAGOSCOPY, GASTROSCOPY, DUODENOSCOPY (EGD), DILATATION ;  Surgeon: Elizabeth Stevenson MD;  Location:  GI     ESOPHAGOSCOPY, GASTROSCOPY, DUODENOSCOPY (EGD), COMBINED  12/31/2013    Procedure: COMBINED ESOPHAGOSCOPY, GASTROSCOPY, DUODENOSCOPY (EGD), BIOPSY SINGLE OR MULTIPLE;;  Surgeon: Clemente Lopez MD;  [facility]     ESOPHAGOSCOPY, GASTROSCOPY, DUODENOSCOPY (EGD), COMBINED  4/1/2014    Procedure: COMBINED ESOPHAGOSCOPY, GASTROSCOPY, DUODENOSCOPY (EGD);;  Surgeon: Clemente Lopez MD;  [facility]     ESOPHAGOSCOPY, GASTROSCOPY, DUODENOSCOPY (EGD), COMBINED  6/28/2014    Procedure: COMBINED ESOPHAGOSCOPY, GASTROSCOPY, DUODENOSCOPY (EGD);  Surgeon: Clemente Lopez MD;  [facility]     ESOPHAGOSCOPY, GASTROSCOPY, DUODENOSCOPY (EGD), COMBINED N/A 8/20/2014    Procedure: COMBINED ESOPHAGOSCOPY, GASTROSCOPY, DUODENOSCOPY (EGD), BIOPSY SINGLE OR MULTIPLE;  Surgeon: Duane, William Charles, MD;  [facility]     ESOPHAGOSCOPY, GASTROSCOPY, DUODENOSCOPY (EGD),  COMBINED N/A 8/22/2014    Procedure: COMBINED ESOPHAGOSCOPY, GASTROSCOPY, DUODENOSCOPY (EGD), BIOPSY SINGLE OR MULTIPLE;  Surgeon: Mello Ferrer MD;  [facility]     ESOPHAGOSCOPY, GASTROSCOPY, DUODENOSCOPY (EGD), COMBINED N/A 10/2/2014    Procedure: COMBINED ESOPHAGOSCOPY, GASTROSCOPY, DUODENOSCOPY (EGD), BIOPSY SINGLE OR MULTIPLE;  Surgeon: Remy Haskins MD;  [facility]     ESOPHAGOSCOPY, GASTROSCOPY, DUODENOSCOPY (EGD), COMBINED Left 12/15/2014    Procedure: COMBINED ESOPHAGOSCOPY, GASTROSCOPY, DUODENOSCOPY (EGD), BIOPSY SINGLE OR MULTIPLE;  Surgeon: Remy Haskins MD;  [facility]     ESOPHAGOSCOPY, GASTROSCOPY, DUODENOSCOPY (EGD), COMBINED N/A 2/25/2015    Procedure: COMBINED ENDOSCOPIC ULTRASOUND, ESOPHAGOSCOPY, GASTROSCOPY, DUODENOSCOPY (EGD), FINE NEEDLE ASPIRATE/BIOPSY;  Surgeon: Clemente Lugo MD;  [facility]     ESOPHAGOSCOPY, GASTROSCOPY, DUODENOSCOPY (EGD), COMBINED Left 2/25/2015    Procedure: COMBINED ESOPHAGOSCOPY, GASTROSCOPY, DUODENOSCOPY (EGD), BIOPSY SINGLE OR MULTIPLE;  Surgeon: Clemente Lugo MD;  [facility]     ESOPHAGOSCOPY, GASTROSCOPY, DUODENOSCOPY (EGD), COMBINED N/A 9/25/2016    Procedure: COMBINED ESOPHAGOSCOPY, GASTROSCOPY, DUODENOSCOPY (EGD);  Surgeon: Aziza Patiño MD;  [facility]     ESOPHAGOSCOPY, GASTROSCOPY, DUODENOSCOPY (EGD), COMBINED N/A 1/18/2017    Procedure: COMBINED ESOPHAGOSCOPY, GASTROSCOPY, DUODENOSCOPY (EGD), BIOPSY SINGLE OR MULTIPLE;  Surgeon: Clemente Lopez MD;  [facility]     ESOPHAGOSCOPY, GASTROSCOPY, DUODENOSCOPY (EGD), COMBINED N/A 11/26/2017    Procedure: COMBINED ESOPHAGOSCOPY, GASTROSCOPY, DUODENOSCOPY (EGD), REMOVE FOREIGN BODY;  Esophagogastroduodenoscopy with foreign body extraction  ;  Surgeon: Herberth Castrejon MD;  [facility]     ESOPHAGOSCOPY, GASTROSCOPY, DUODENOSCOPY (EGD), COMBINED N/A 11/26/2017    Procedure: COMBINED ESOPHAGOSCOPY, GASTROSCOPY, DUODENOSCOPY (EGD), REMOVE  FOREIGN BODY;;  Surgeon: Herberth Castrejon MD;  [facility]     ESOPHAGOSCOPY, GASTROSCOPY, DUODENOSCOPY (EGD), COMBINED N/A 4/10/2020    Procedure: ESOPHAGOGASTRODUODENOSCOPY (EGD);  Surgeon: Yael Cabral MD;  [facility]     FASCIOTOMY LOWER EXTREMITY Left 6/10/2016    Procedure: FASCIOTOMY LOWER EXTREMITY;  Surgeon: Mello Rodriguez MD;  [facility]     HC CAPSULE ENDOSCOPY N/A 8/25/2014    Procedure: CAPSULE/PILL CAM ENDOSCOPY;  Surgeon: Remy Haskins MD;  [facility]     HC CAPSULE ENDOSCOPY N/A 10/2/2014    Procedure: CAPSULE/PILL CAM ENDOSCOPY;  Surgeon: Remy Haskins MD;  [facility]     INJECT BLOCK MEDIAL BRANCH CERVICAL/THORACIC/LUMBAR Left 10/30/2020    Procedure: Cervical 3, 4, and 5 Medial Branch Block;  Surgeon: Evelyn Lima MD;  Location: [facility] OR     ORTHOPEDIC SURGERY      broken wrist repair     REVISE CATHETER INTRATHECAL  08/24/2020     SEX TRANSFORMATION SURGERY, MALE TO FEMALE  1974 1974     SINUS SURGERY      cyst removed     TONSILLECTOMY       VASCULAR SURGERY  2006    Left carotid stent     Current Outpatient Medications   Medication     ADVAIR DISKUS 250-50 MCG/DOSE inhaler     albuterol (PROAIR HFA/PROVENTIL HFA/VENTOLIN HFA) 108 (90 Base) MCG/ACT inhaler     albuterol (PROVENTIL) (2.5 MG/3ML) 0.083% neb solution     alendronate (FOSAMAX) 70 MG tablet     ASPERCREME LIDOCAINE 4 % Patch     ASPIRIN LOW DOSE 81 MG chewable tablet     atorvastatin (LIPITOR) 40 MG tablet     blood glucose monitoring (ONE TOUCH ULTRA 2) meter device kit     blood glucose monitoring (ONE TOUCH ULTRA) test strip     blood glucose monitoring (ONE TOUCH ULTRASOFT) lancets     brimonidine (ALPHAGAN) 0.2 % ophthalmic solution     capsaicin-menthol-methyl sal 0.025-1-12 % external cream     diclofenac (VOLTAREN) 1 % topical gel     dorzolamide (TRUSOPT) 2 % ophthalmic solution     EPINEPHrine (ANY BX GENERIC EQUIV) 0.3 MG/0.3ML injection 2-pack     escitalopram  (LEXAPRO) 10 MG tablet     FEROSUL 325 (65 Fe) MG tablet     fluticasone (FLONASE) 50 MCG/ACT nasal spray     INCRUSE ELLIPTA 62.5 MCG/INH Inhaler     lactulose (CHRONULAC) 10 GM/15ML solution     latanoprost (XALATAN) 0.005 % ophthalmic solution     levETIRAcetam (KEPPRA) 500 MG tablet     lisinopril (ZESTRIL) 20 MG tablet     loratadine (CLARITIN) 10 MG tablet     Menthol, Topical Analgesic, 5 % GEL     metFORMIN (GLUCOPHAGE) 500 MG tablet     metoprolol succinate ER (TOPROL-XL) 50 MG 24 hr tablet     Multiple Vitamins-Minerals (TAB-A-MACY) TABS     multivitamin, therapeutic (THERA-VIT) TABS tablet     nicotine (NICODERM CQ) 14 MG/24HR 24 hr patch     nitroGLYcerin (NITROSTAT) 0.4 MG sublingual tablet     Nutritional Supplements (ENSURE NUTRITION SHAKE) LIQD     pantoprazole (PROTONIX) 40 MG EC tablet     phenytoin (DILANTIN) 100 MG capsule     phenytoin sodium extended (DILANTIN) 200 MG capsule     polyethylene glycol (MIRALAX) 17 GM/SCOOP powder     pregabalin (LYRICA) 150 MG capsule     risperiDONE (RISPERDAL) 0.5 MG tablet     SENEXON-S 8.6-50 MG tablet     sennosides (SENOKOT) 8.6 MG tablet     solifenacin (VESICARE) 10 MG tablet     traZODone (DESYREL) 150 MG tablet     vitamin D3 (CHOLECALCIFEROL) 10 MCG (400 UNIT) capsule     nicotine (COMMIT) 2 MG lozenge     Vitamin D3 (VITAMIN D) 10 MCG (400 UNIT) tablet     No current facility-administered medications for this visit.         Allergies   Allergen Reactions     Bee Venom Anaphylaxis     Penicillins Anaphylaxis     Dilantin [Phenytoin] Other (See Comments)     Generic dilantin only per pt     Iodine Hives     Novocaine [Procaine] Hives     Tositumomab Unknown     FH: No family history of urologic problems similar to those being experienced by the patient.   Social History     Socioeconomic History     Marital status:      Spouse name: Not on file     Number of children: 2     Years of education: Not on file     Highest education level: Not on file    Occupational History     Occupation: Retired   Social Needs     Financial resource strain: Not on file     Food insecurity     Worry: Not on file     Inability: Not on file     Transportation needs     Medical: Not on file     Non-medical: Not on file   Tobacco Use     Smoking status: Current Every Day Smoker     Packs/day: 1.00     Years: 30.00     Pack years: 30.00     Types: Cigarettes, Cigars     Smokeless tobacco: Never Used     Tobacco comment: 1.5 packs per day    Substance and Sexual Activity     Alcohol use: No     Alcohol/week: 0.0 standard drinks     Drug use: No     Sexual activity: Not Currently   Lifestyle     Physical activity     Days per week: Not on file     Minutes per session: Not on file     Stress: Not on file   Relationships     Social connections     Talks on phone: Not on file     Gets together: Not on file     Attends Spiritism service: Not on file     Active member of club or organization: Not on file     Attends meetings of clubs or organizations: Not on file     Relationship status: Not on file     Intimate partner violence     Fear of current or ex partner: Not on file     Emotionally abused: Not on file     Physically abused: Not on file     Forced sexual activity: Not on file   Other Topics Concern     Parent/sibling w/ CABG, MI or angioplasty before 65F 55M? Not Asked      Service Not Asked     Blood Transfusions Not Asked     Caffeine Concern No     Comment: 1 in the morning     Occupational Exposure Not Asked     Hobby Hazards Not Asked     Sleep Concern Not Asked     Stress Concern Not Asked     Weight Concern Not Asked     Special Diet Not Asked     Back Care Not Asked     Exercise Not Asked     Bike Helmet Not Asked     Seat Belt Not Asked     Self-Exams Not Asked   Social History Narrative    Living in a nursing home (as of 2020) for prosthetic fitting and therapy; usually resides in an [facility].    Two adopted children (Kam and Prashanth) and 3 grandchildren (as of 2020).  "     ROS: 10 point ROS neg other than the symptoms noted above in the HPI.     Exam:  Exam:  A+O x 3  Speech normal  Affect normal  The remainder of the physical exam was not performed as this was a telephone visit.      Imaging: none recent    Labs: none recent    A/P: 71 y/o woman with OAB and SPT with bladder pain and spasms refractory to Botox. Will see if Dr. Oliver thinks there are other options. PNE a possibility but she's probably a high risk for device infection given her multiple comorbidities and vascular disease. Can't have PNE due to b/l AKAs.   Otherwise, she remains scheduled for another Botox injection in January and that can be with me or Dr. Oliver.         Sonya Foote is a 70 year old female who is being evaluated via a billable telephone visit.      The patient has been notified of following:     \"This telephone visit will be conducted via a call between you and your physician/provider. We have found that certain health care needs can be provided without the need for a physical exam.  This service lets us provide the care you need with a short phone conversation.  If a prescription is necessary we can send it directly to your pharmacy.  If lab work is needed we can place an order for that and you can then stop by our lab to have the test done at a later time.    Telephone visits are billed at different rates depending on your insurance coverage. During this emergency period, for some insurers they may be billed the same as an in-person visit.  Please reach out to your insurance provider with any questions.    If during the course of the call the physician/provider feels a telephone visit is not appropriate, you will not be charged for this service.\"    Patient has given verbal consent for Telephone visit?  Yes    What phone number would you like to be contacted at? [phone]    How would you like to obtain your AVS? Mail a copy    Phone call duration: 15 minutes    Loki Gordon MD      "

## 2020-12-14 NOTE — PATIENT INSTRUCTIONS
Schedule an appointment with Dr. Oliver to discuss alternative treatment options.     It was a pleasure meeting with you today.  Thank you for allowing me and my team the privilege of caring for you today.  YOU are the reason we are here, and I truly hope we provided you with the excellent service you deserve.  Please let us know if there is anything else we can do for you so that we can be sure you are leaving completely satisfied with your care experience.        Ara Marte, CMA

## 2020-12-14 NOTE — PROGRESS NOTES
HPI:  Sonya Foote is a 70 year old with chronic pain syndrome, OAB and incontinence who is managed with SPT. She gets bladder spasms and pain which did not respond to Botox injection -- she got about a month of relief, at most, from the October injection. She had originally seen Dr Oliver in 2018 who found her to be a poor candidate for oral antimuscarinics due to PVD and glaucoma.     She is asking for pain meds today for the bladder pain.     Past Medical History:   Diagnosis Date     Acid reflux disease      Amputation above knee (H)     bilateral     Benign essential hypertension      Blind left eye     due to central retinal artery occlusion     CAD (coronary artery disease)     UA and inferior MI in 2016 s/p PCI     Chronic back pain      Chronic neck pain      Chronic pain syndrome     with fentanyl intrathecal pain pump     Complex sleep apnea syndrome      COPD (chronic obstructive pulmonary disease) (H)      Dysphagia     chronic due to esophageal strictures and multiple previous dilitations      ROGER (generalized anxiety disorder)      History of blood transfusion     x5; no adverse reactions     History of central retinal artery occlusion 2006    left-sided     History of stroke     residual left-sided weakness     Hx of carotid artery stenosis     s/p left carotid stent in 2006 and balloon angioplasty in 2016     MDD (major depressive disorder)      Neurogenic bladder     SPT in place     JOSE (obstructive sleep apnea)      Osteoporosis      PAD (peripheral artery disease) (H)      Peripheral neuropathy      Person who has had sex change operation     male to female     Seizure disorder (H)     many years since last grand mal; daily, brief petit mals     Sickle cell trait (H)      Type 2 diabetes mellitus (H)      Past Surgical History:   Procedure Laterality Date     AMPUTATE LEG ABOVE KNEE Left 6/11/2016    Procedure: AMPUTATE LEG ABOVE KNEE;  Surgeon: Mello Rodriguez MD;  [facility]     AMPUTATE  LEG BELOW KNEE Right 11/7/2016    Procedure: AMPUTATE LEG BELOW KNEE;  Surgeon: Savannah Durant MD;  [facility]     AMPUTATE REVISION STUMP LOWER EXTREMITY Right 11/11/2016    Procedure: AMPUTATE REVISION STUMP LOWER EXTREMITY;  Surgeon: Savannah Durant MD;  [facility]     AMPUTATE REVISION STUMP LOWER EXTREMITY Right 11/16/2016    Procedure: AMPUTATE REVISION STUMP LOWER EXTREMITY;  Surgeon: Savannah Durant MD;  [facility]     AMPUTATE TOE(S) Right 1/5/2016    Procedure: AMPUTATE TOE(S);  Surgeon: Mello Gaines DPM;  Location: [facility]     ANGIOGRAM Bilateral 11/21/2014    Procedure: ANGIOGRAM;  Surgeon: Savannah Durant MD;  [facility]     ANGIOGRAM Left 1/16/2015    Procedure: ANGIOGRAM;  Surgeon: Savannah Durant MD;  [facility]     ANGIOGRAM Bilateral 9/14/2015    Procedure: ANGIOGRAM;  Surgeon: Savannah Durant MD;  [facility]     ANGIOGRAM Left 10/12/2015    Procedure: ANGIOGRAM;  Surgeon: Savannah Durant MD;  [facility]     ANGIOGRAM Right 6/6/2016    Procedure: ANGIOGRAM;  Surgeon: Savannah Durant MD;  [facility]     ANGIOPLASTY Right 6/6/2016    Procedure: ANGIOPLASTY;  Surgeon: Savannah Durant MD;  [facility]     APPENDECTOMY       BREAST BIOPSY, RT/LT Right     benign     CATARACT IOL, RT/LT Right      CHOLECYSTECTOMY       COLONOSCOPY N/A 8/25/2014    Procedure: COLONOSCOPY;  Surgeon: Mello Ferrer MD;  [facility]     COLONOSCOPY WITH CO2 INSUFFLATION N/A 8/20/2014    Procedure: COLONOSCOPY WITH CO2 INSUFFLATION;  Surgeon: Duane, William Charles, MD;  Location: [facility]     CYSTOSCOPY, INJECT BOTOX N/A 5/21/2020    Procedure: CYSTOSCOPY, WITH BOTULINUM TOXIN INJECTION;  Surgeon: Loki Gordon MD;  [facility]     CYSTOSCOPY, INJECT BOTOX N/A 10/8/2020    Procedure: CYSTOSCOPY, INJECT BOTOX INTO BLADDER, SUPRAPUBIC TUBE EXCHNAGE;  Surgeon: Loki Gordon MD;  [facility]     CYSTOSCOPY, INTRAVESICAL INJECTION N/A 10/31/2019    Procedure:  CYSTOSCOPY, BOTOX INJECTION, Suprapubic Catheter Exchange;  Surgeon: Loki Gordon MD;  [facility]     CYSTOSTOMY, INSERT TUBE SUPRAPUBIC, COMBINED N/A 1/16/2018    Procedure: COMBINED CYSTOSTOMY, INSERT TUBE SUPRAPUBIC;  Cystoscopy, Intraoperative Ultrasound, Suprapubic Tube Placement;  Surgeon: Keanu Dawson MD;  [facility]     ENDARTERECTOMY FEMORAL  5/23/2014    Procedure: ENDARTERECTOMY FEMORAL;  Surgeon: Jason Joshi MD;  [facility]     ESOPHAGOSCOPY, GASTROSCOPY, DUODENOSCOPY (EGD), COMBINED  12/14/2012    Procedure: COMBINED ESOPHAGOSCOPY, GASTROSCOPY, DUODENOSCOPY (EGD), BIOPSY SINGLE OR MULTIPLE;  ESOPHAGOSCOPY, GASTROSCOPY, DUODENOSCOPY (EGD), DILATATION ;  Surgeon: Elizabeth Stevenson MD;  Location:  GI     ESOPHAGOSCOPY, GASTROSCOPY, DUODENOSCOPY (EGD), COMBINED  12/31/2013    Procedure: COMBINED ESOPHAGOSCOPY, GASTROSCOPY, DUODENOSCOPY (EGD), BIOPSY SINGLE OR MULTIPLE;;  Surgeon: Clemente Lopez MD;  [facility]     ESOPHAGOSCOPY, GASTROSCOPY, DUODENOSCOPY (EGD), COMBINED  4/1/2014    Procedure: COMBINED ESOPHAGOSCOPY, GASTROSCOPY, DUODENOSCOPY (EGD);;  Surgeon: Clemente Lopez MD;  Location:  GI     ESOPHAGOSCOPY, GASTROSCOPY, DUODENOSCOPY (EGD), COMBINED  6/28/2014    Procedure: COMBINED ESOPHAGOSCOPY, GASTROSCOPY, DUODENOSCOPY (EGD);  Surgeon: Clemente Lopez MD;  [facility]     ESOPHAGOSCOPY, GASTROSCOPY, DUODENOSCOPY (EGD), COMBINED N/A 8/20/2014    Procedure: COMBINED ESOPHAGOSCOPY, GASTROSCOPY, DUODENOSCOPY (EGD), BIOPSY SINGLE OR MULTIPLE;  Surgeon: Duane, William Charles, MD;  Location:  OR     ESOPHAGOSCOPY, GASTROSCOPY, DUODENOSCOPY (EGD), COMBINED N/A 8/22/2014    Procedure: COMBINED ESOPHAGOSCOPY, GASTROSCOPY, DUODENOSCOPY (EGD), BIOPSY SINGLE OR MULTIPLE;  Surgeon: Mello Ferrer MD;  [facility]     ESOPHAGOSCOPY, GASTROSCOPY, DUODENOSCOPY (EGD), COMBINED N/A 10/2/2014    Procedure: COMBINED ESOPHAGOSCOPY, GASTROSCOPY,  DUODENOSCOPY (EGD), BIOPSY SINGLE OR MULTIPLE;  Surgeon: Remy Haskins MD;  [facility]     ESOPHAGOSCOPY, GASTROSCOPY, DUODENOSCOPY (EGD), COMBINED Left 12/15/2014    Procedure: COMBINED ESOPHAGOSCOPY, GASTROSCOPY, DUODENOSCOPY (EGD), BIOPSY SINGLE OR MULTIPLE;  Surgeon: Remy Haskins MD;  [facility]     ESOPHAGOSCOPY, GASTROSCOPY, DUODENOSCOPY (EGD), COMBINED N/A 2/25/2015    Procedure: COMBINED ENDOSCOPIC ULTRASOUND, ESOPHAGOSCOPY, GASTROSCOPY, DUODENOSCOPY (EGD), FINE NEEDLE ASPIRATE/BIOPSY;  Surgeon: Clemente Lugo MD;  [facility]     ESOPHAGOSCOPY, GASTROSCOPY, DUODENOSCOPY (EGD), COMBINED Left 2/25/2015    Procedure: COMBINED ESOPHAGOSCOPY, GASTROSCOPY, DUODENOSCOPY (EGD), BIOPSY SINGLE OR MULTIPLE;  Surgeon: Clemente Lugo MD;  [facility]     ESOPHAGOSCOPY, GASTROSCOPY, DUODENOSCOPY (EGD), COMBINED N/A 9/25/2016    Procedure: COMBINED ESOPHAGOSCOPY, GASTROSCOPY, DUODENOSCOPY (EGD);  Surgeon: Aziza Patiño MD;  [facility]     ESOPHAGOSCOPY, GASTROSCOPY, DUODENOSCOPY (EGD), COMBINED N/A 1/18/2017    Procedure: COMBINED ESOPHAGOSCOPY, GASTROSCOPY, DUODENOSCOPY (EGD), BIOPSY SINGLE OR MULTIPLE;  Surgeon: Clemente Lopez MD;  [facility]     ESOPHAGOSCOPY, GASTROSCOPY, DUODENOSCOPY (EGD), COMBINED N/A 11/26/2017    Procedure: COMBINED ESOPHAGOSCOPY, GASTROSCOPY, DUODENOSCOPY (EGD), REMOVE FOREIGN BODY;  Esophagogastroduodenoscopy with foreign body extraction  ;  Surgeon: Herberth Castrejon MD;  [facility]     ESOPHAGOSCOPY, GASTROSCOPY, DUODENOSCOPY (EGD), COMBINED N/A 11/26/2017    Procedure: COMBINED ESOPHAGOSCOPY, GASTROSCOPY, DUODENOSCOPY (EGD), REMOVE FOREIGN BODY;;  Surgeon: Herberth Castrejon MD;  [facility]     ESOPHAGOSCOPY, GASTROSCOPY, DUODENOSCOPY (EGD), COMBINED N/A 4/10/2020    Procedure: ESOPHAGOGASTRODUODENOSCOPY (EGD);  Surgeon: Yael Cabral MD;  [facility]     FASCIOTOMY LOWER EXTREMITY Left 6/10/2016     Procedure: FASCIOTOMY LOWER EXTREMITY;  Surgeon: Mello Rodriguez MD;  [facility]     HC CAPSULE ENDOSCOPY N/A 8/25/2014    Procedure: CAPSULE/PILL CAM ENDOSCOPY;  Surgeon: Remy Haskins MD;  [facility]     HC CAPSULE ENDOSCOPY N/A 10/2/2014    Procedure: CAPSULE/PILL CAM ENDOSCOPY;  Surgeon: Remy Haskins MD;  [facility]     INJECT BLOCK MEDIAL BRANCH CERVICAL/THORACIC/LUMBAR Left 10/30/2020    Procedure: Cervical 3, 4, and 5 Medial Branch Block;  Surgeon: Evelyn Lima MD;  Location: [facility]     ORTHOPEDIC SURGERY      broken wrist repair     REVISE CATHETER INTRATHECAL  08/24/2020     SEX TRANSFORMATION SURGERY, MALE TO FEMALE  1974 1974     SINUS SURGERY      cyst removed     TONSILLECTOMY       VASCULAR SURGERY  2006    Left carotid stent     Current Outpatient Medications   Medication     ADVAIR DISKUS 250-50 MCG/DOSE inhaler     albuterol (PROAIR HFA/PROVENTIL HFA/VENTOLIN HFA) 108 (90 Base) MCG/ACT inhaler     albuterol (PROVENTIL) (2.5 MG/3ML) 0.083% neb solution     alendronate (FOSAMAX) 70 MG tablet     ASPERCREME LIDOCAINE 4 % Patch     ASPIRIN LOW DOSE 81 MG chewable tablet     atorvastatin (LIPITOR) 40 MG tablet     blood glucose monitoring (ONE TOUCH ULTRA 2) meter device kit     blood glucose monitoring (ONE TOUCH ULTRA) test strip     blood glucose monitoring (ONE TOUCH ULTRASOFT) lancets     brimonidine (ALPHAGAN) 0.2 % ophthalmic solution     capsaicin-menthol-methyl sal 0.025-1-12 % external cream     diclofenac (VOLTAREN) 1 % topical gel     dorzolamide (TRUSOPT) 2 % ophthalmic solution     EPINEPHrine (ANY BX GENERIC EQUIV) 0.3 MG/0.3ML injection 2-pack     escitalopram (LEXAPRO) 10 MG tablet     FEROSUL 325 (65 Fe) MG tablet     fluticasone (FLONASE) 50 MCG/ACT nasal spray     INCRUSE ELLIPTA 62.5 MCG/INH Inhaler     lactulose (CHRONULAC) 10 GM/15ML solution     latanoprost (XALATAN) 0.005 % ophthalmic solution     levETIRAcetam (KEPPRA) 500 MG tablet      lisinopril (ZESTRIL) 20 MG tablet     loratadine (CLARITIN) 10 MG tablet     Menthol, Topical Analgesic, 5 % GEL     metFORMIN (GLUCOPHAGE) 500 MG tablet     metoprolol succinate ER (TOPROL-XL) 50 MG 24 hr tablet     Multiple Vitamins-Minerals (TAB-A-MACY) TABS     multivitamin, therapeutic (THERA-VIT) TABS tablet     nicotine (NICODERM CQ) 14 MG/24HR 24 hr patch     nitroGLYcerin (NITROSTAT) 0.4 MG sublingual tablet     Nutritional Supplements (ENSURE NUTRITION SHAKE) LIQD     pantoprazole (PROTONIX) 40 MG EC tablet     phenytoin (DILANTIN) 100 MG capsule     phenytoin sodium extended (DILANTIN) 200 MG capsule     polyethylene glycol (MIRALAX) 17 GM/SCOOP powder     pregabalin (LYRICA) 150 MG capsule     risperiDONE (RISPERDAL) 0.5 MG tablet     SENEXON-S 8.6-50 MG tablet     sennosides (SENOKOT) 8.6 MG tablet     solifenacin (VESICARE) 10 MG tablet     traZODone (DESYREL) 150 MG tablet     vitamin D3 (CHOLECALCIFEROL) 10 MCG (400 UNIT) capsule     nicotine (COMMIT) 2 MG lozenge     Vitamin D3 (VITAMIN D) 10 MCG (400 UNIT) tablet     No current facility-administered medications for this visit.         Allergies   Allergen Reactions     Bee Venom Anaphylaxis     Penicillins Anaphylaxis     Dilantin [Phenytoin] Other (See Comments)     Generic dilantin only per pt     Iodine Hives     Novocaine [Procaine] Hives     Tositumomab Unknown     FH: No family history of urologic problems similar to those being experienced by the patient.   Social History     Socioeconomic History     Marital status:      Spouse name: Not on file     Number of children: 2     Years of education: Not on file     Highest education level: Not on file   Occupational History     Occupation: Retired   Social Needs     Financial resource strain: Not on file     Food insecurity     Worry: Not on file     Inability: Not on file     Transportation needs     Medical: Not on file     Non-medical: Not on file   Tobacco Use     Smoking status:  Current Every Day Smoker     Packs/day: 1.00     Years: 30.00     Pack years: 30.00     Types: Cigarettes, Cigars     Smokeless tobacco: Never Used     Tobacco comment: 1.5 packs per day    Substance and Sexual Activity     Alcohol use: No     Alcohol/week: 0.0 standard drinks     Drug use: No     Sexual activity: Not Currently   Lifestyle     Physical activity     Days per week: Not on file     Minutes per session: Not on file     Stress: Not on file   Relationships     Social connections     Talks on phone: Not on file     Gets together: Not on file     Attends [employer] service: Not on file     Active member of club or organization: Not on file     Attends meetings of clubs or organizations: Not on file     Relationship status: Not on file     Intimate partner violence     Fear of current or ex partner: Not on file     Emotionally abused: Not on file     Physically abused: Not on file     Forced sexual activity: Not on file   Other Topics Concern     Parent/sibling w/ CABG, MI or angioplasty before 65F 55M? Not Asked      Service Not Asked     Blood Transfusions Not Asked     Caffeine Concern No     Comment: 1 in the morning     Occupational Exposure Not Asked     Hobby Hazards Not Asked     Sleep Concern Not Asked     Stress Concern Not Asked     Weight Concern Not Asked     Special Diet Not Asked     Back Care Not Asked     Exercise Not Asked     Bike Helmet Not Asked     Seat Belt Not Asked     Self-Exams Not Asked   Social History Narrative    Living in a nursing home (as of 2020) for prosthetic fitting and therapy; usually resides in an [facility].    Two adopted children (Kam and Prashanth) and 3 grandchildren (as of 2020).      ROS: 10 point ROS neg other than the symptoms noted above in the HPI.     Exam:  Exam:  A+O x 3  Speech normal  Affect normal  The remainder of the physical exam was not performed as this was a telephone visit.      Imaging: none recent    Labs: none recent    A/P: 69 y/o woman with  "OAB and SPT with bladder pain and spasms refractory to Botox. Will see if Dr. Oliver thinks there are other options. PNE a possibility but she's probably a high risk for device infection given her multiple comorbidities and vascular disease. Can't have PNE due to b/l AKAs.   Otherwise, she remains scheduled for another Botox injection in January and that can be with me or Dr. Oliver.         Sonya Foote is a 70 year old female who is being evaluated via a billable telephone visit.      The patient has been notified of following:     \"This telephone visit will be conducted via a call between you and your physician/provider. We have found that certain health care needs can be provided without the need for a physical exam.  This service lets us provide the care you need with a short phone conversation.  If a prescription is necessary we can send it directly to your pharmacy.  If lab work is needed we can place an order for that and you can then stop by our lab to have the test done at a later time.    Telephone visits are billed at different rates depending on your insurance coverage. During this emergency period, for some insurers they may be billed the same as an in-person visit.  Please reach out to your insurance provider with any questions.    If during the course of the call the physician/provider feels a telephone visit is not appropriate, you will not be charged for this service.\"    Patient has given verbal consent for Telephone visit?  Yes    What phone number would you like to be contacted at? [phone]    How would you like to obtain your AVS? Mail a copy    Phone call duration: 15 minutes    Loki Gordon MD      "

## 2020-12-15 RX ORDER — LACTOSE-REDUCED FOOD
LIQUID (ML) ORAL
Qty: 11376 ML | Refills: 11 | Status: SHIPPED | OUTPATIENT
Start: 2020-12-15

## 2020-12-15 NOTE — TELEPHONE ENCOUNTER
Diclofenac    Prescription approved per [facility] Refill Protocol.    Valeria CACERESN, RN, PHN

## 2020-12-15 NOTE — TELEPHONE ENCOUNTER
Ensure    Routing refill request to provider for review/approval because:  Drug not on the [facility] refill protocol     Valeria CACERESN, RN, PHN

## 2020-12-16 DIAGNOSIS — M19.042 PRIMARY OSTEOARTHRITIS OF BOTH HANDS: Primary | ICD-10-CM

## 2020-12-16 DIAGNOSIS — M19.041 PRIMARY OSTEOARTHRITIS OF BOTH HANDS: Primary | ICD-10-CM

## 2020-12-16 RX ORDER — PSEUDOEPHED/ACETAMINOPH/DIPHEN 30MG-500MG
TABLET ORAL
Qty: 90 TABLET | Refills: 5 | Status: SHIPPED | OUTPATIENT
Start: 2020-12-16

## 2020-12-17 DIAGNOSIS — Z53.9 DIAGNOSIS NOT YET DEFINED: Primary | ICD-10-CM

## 2020-12-17 PROCEDURE — G0179 MD RECERTIFICATION HHA PT: HCPCS | Performed by: INTERNAL MEDICINE

## 2020-12-20 RX ORDER — DORZOLAMIDE HCL 20 MG/ML
1 SOLUTION/ DROPS OPHTHALMIC 2 TIMES DAILY
Qty: 10 ML | Refills: 2 | Status: ON HOLD | OUTPATIENT
Start: 2020-12-20 | End: 2020-12-26

## 2020-12-21 NOTE — TELEPHONE ENCOUNTER
On chart med review, pt has never been on Cosopt although her ophthalmology notes states so. Trusopt refilled until next visit in 6 months for re-eval.

## 2020-12-22 ENCOUNTER — TELEPHONE (OUTPATIENT)
Dept: PULMONOLOGY | Facility: CLINIC | Age: 70
End: 2020-12-22

## 2020-12-23 ENCOUNTER — RECORDS - HEALTHEAST (OUTPATIENT)
Dept: LAB | Facility: CLINIC | Age: 70
End: 2020-12-23

## 2020-12-23 ENCOUNTER — OFFICE VISIT (OUTPATIENT)
Dept: INTERNAL MEDICINE | Facility: CLINIC | Age: 70
End: 2020-12-23
Payer: COMMERCIAL

## 2020-12-23 VITALS
HEART RATE: 61 BPM | DIASTOLIC BLOOD PRESSURE: 68 MMHG | OXYGEN SATURATION: 96 % | WEIGHT: 125 LBS | RESPIRATION RATE: 16 BRPM | BODY MASS INDEX: 47.53 KG/M2 | SYSTOLIC BLOOD PRESSURE: 106 MMHG | TEMPERATURE: 97.7 F

## 2020-12-23 DIAGNOSIS — N31.9 NEUROGENIC BLADDER: ICD-10-CM

## 2020-12-23 DIAGNOSIS — J44.9 CHRONIC OBSTRUCTIVE PULMONARY DISEASE, UNSPECIFIED COPD TYPE (H): ICD-10-CM

## 2020-12-23 DIAGNOSIS — Z86.79 HISTORY OF CORONARY ARTERY DISEASE: ICD-10-CM

## 2020-12-23 DIAGNOSIS — G40.909 SEIZURE DISORDER (H): ICD-10-CM

## 2020-12-23 DIAGNOSIS — Z01.818 PREOP GENERAL PHYSICAL EXAM: Primary | ICD-10-CM

## 2020-12-23 DIAGNOSIS — E11.59 TYPE 2 DIABETES MELLITUS WITH OTHER CIRCULATORY COMPLICATION, WITHOUT LONG-TERM CURRENT USE OF INSULIN (H): ICD-10-CM

## 2020-12-23 DIAGNOSIS — G89.4 CHRONIC PAIN SYNDROME: ICD-10-CM

## 2020-12-23 DIAGNOSIS — I10 BENIGN ESSENTIAL HYPERTENSION: ICD-10-CM

## 2020-12-23 LAB
HGB BLD-MCNC: 12.7 G/DL (ref 11.7–15.7)
PLATELET # BLD AUTO: 227 10E9/L (ref 150–450)
POTASSIUM SERPL-SCNC: 4.4 MMOL/L (ref 3.4–5.3)
SARS-COV-2 PCR COMMENT: NORMAL
SARS-COV-2 RNA SPEC QL NAA+PROBE: NEGATIVE
SARS-COV-2 VIRUS SPECIMEN SOURCE: NORMAL

## 2020-12-23 PROCEDURE — 85018 HEMOGLOBIN: CPT | Performed by: INTERNAL MEDICINE

## 2020-12-23 PROCEDURE — 99215 OFFICE O/P EST HI 40 MIN: CPT | Performed by: INTERNAL MEDICINE

## 2020-12-23 PROCEDURE — 93000 ELECTROCARDIOGRAM COMPLETE: CPT | Performed by: INTERNAL MEDICINE

## 2020-12-23 PROCEDURE — 36415 COLL VENOUS BLD VENIPUNCTURE: CPT | Performed by: INTERNAL MEDICINE

## 2020-12-23 PROCEDURE — 85049 AUTOMATED PLATELET COUNT: CPT | Performed by: INTERNAL MEDICINE

## 2020-12-23 PROCEDURE — 84132 ASSAY OF SERUM POTASSIUM: CPT | Performed by: INTERNAL MEDICINE

## 2020-12-23 RX ORDER — ALBUTEROL SULFATE 90 UG/1
2 AEROSOL, METERED RESPIRATORY (INHALATION) EVERY 6 HOURS
Qty: 1 INHALER | Refills: 5 | Status: SHIPPED | OUTPATIENT
Start: 2020-12-23 | End: 2021-04-12

## 2020-12-23 NOTE — PROGRESS NOTES
[address] [address]  Phone: [phone]  Primary Provider: Arben Casey  Pre-op Performing Provider: ARBEN CASEY    PREOPERATIVE EVALUATION:  Today's date: 12/23/2020    Sonya Foote is a 70 year old female who presents for a preoperative evaluation.    Surgical Information:  Surgery/Procedure: Cystoscopy with botulinum toxin injection   Surgery Location: [facility]  Surgeon: Dr. Gordon   Surgery Date: 1/7/21  Time of Surgery: 9:20 am   Where patient plans to recover: At a nursing home  Fax number for surgical facility: Note does not need to be faxed, will be available electronically in Epic.    Type of Anesthesia Anticipated: Monitor Anesthesia Care     Subjective     HPI related to upcoming procedure: Cystoscopy with botulinum toxin injection     Health Care Directive:  Patient has a Health Care Directive on file  56}    Status of Chronic Conditions:  See problem list for active medical problems.  Problems all longstanding and stable, except as noted/documented.  See ROS for pertinent symptoms related to these conditions.    Review of Systems  CONSTITUTIONAL: NEGATIVE for fever, chills, change in weight  EYES: NEGATIVE for vision changes or irritation  ENT/MOUTH: NEGATIVE for ear, mouth and throat problems  RESP: NEGATIVE for significant cough or SOB  CV: NEGATIVE for chest pain, palpitations or peripheral edema  GI: NEGATIVE for nausea, abdominal pain, heartburn, or change in bowel habitsia  HEME: NEGATIVE for bleeding problems  PSYCHIATRIC: NEGATIVE for changes in mood or affect    Patient Active Problem List    Diagnosis Date Noted     Arthropathy of cervical facet joint 10/16/2020     Priority: Medium     Added automatically from request for surgery [id number]       Chronic insomnia 09/01/2020     Priority: Medium     Chest pain on breathing 06/10/2020     Priority: Medium     Neurogenic bladder 05/21/2020     Priority: Medium      Morbid obesity (H) 05/14/2020     Priority: Medium     History of blood transfusion      Priority: Medium     x5; no adverse reactions       COPD (chronic obstructive pulmonary disease) (H)      Priority: Medium     Type 2 diabetes mellitus (H)      Priority: Medium     Benign essential hypertension      Priority: Medium     History of stroke      Priority: Medium     residual left-sided weakness       Sickle cell trait (H)      Priority: Medium     MDD (major depressive disorder)      Priority: Medium     Seizure disorder (H)      Priority: Medium     many years since last grand mal; daily, brief petit mals       Chronic back pain      Priority: Medium     Chronic neck pain      Priority: Medium     ROGER (generalized anxiety disorder)      Priority: Medium     Person who has had sex change operation      Priority: Medium     male to female       Osteoporosis      Priority: Medium     PAD (peripheral artery disease) (H)      Priority: Medium     Peripheral neuropathy      Priority: Medium     JOSE (obstructive sleep apnea)      Priority: Medium     Amputation above knee (H)      Priority: Medium     bilateral       Blind left eye      Priority: Medium     due to central retinal artery occlusion       Acid reflux disease      Priority: Medium     Hx of carotid artery stenosis      Priority: Medium     s/p left carotid stent in 2006       Complex sleep apnea syndrome      Priority: Medium     Dysphagia      Priority: Medium     chronic due to esophageal strictures and multiple previous dilitations        CAD (coronary artery disease)      Priority: Medium     UA and inferior MI in 2016 s/p PCI       Chronic pain syndrome 03/20/2009     Priority: Medium     Has a fentanyl PUMP    Patient is followed by Allan Casey for ongoing prescription of pain meds  All refills should be approved by this provider, or covering partner.    Maximum quantity per month: 1 month  Clinic visit frequency required: Q 6  months      Controlled substance agreement:  Encounter-Level CSA:     There are no encounter-level csa.        Pain Clinic evaluation in the past: No    DIRE Total Score(s):  No flowsheet data found.    Last [facility] website verification:  6/6/18   [email or web address]       History of central retinal artery occlusion 2006     Priority: Medium     left-sided        Past Medical History:   Diagnosis Date     Acid reflux disease      Amputation above knee (H)     bilateral     Benign essential hypertension      Blind left eye     due to central retinal artery occlusion     CAD (coronary artery disease)     UA and inferior MI in 2016 s/p PCI     Chronic back pain      Chronic neck pain      Chronic pain syndrome     with fentanyl intrathecal pain pump     Complex sleep apnea syndrome      COPD (chronic obstructive pulmonary disease) (H)      Dysphagia     chronic due to esophageal strictures and multiple previous dilitations      ROGER (generalized anxiety disorder)      History of blood transfusion     x5; no adverse reactions     History of central retinal artery occlusion 2006    left-sided     History of stroke     residual left-sided weakness     Hx of carotid artery stenosis     s/p left carotid stent in 2006 and balloon angioplasty in 2016     MDD (major depressive disorder)      Neurogenic bladder     SPT in place     JOSE (obstructive sleep apnea)      Osteoporosis      PAD (peripheral artery disease) (H)      Peripheral neuropathy      Person who has had sex change operation     male to female     Seizure disorder (H)     many years since last grand mal; daily, brief petit mals     Sickle cell trait (H)      Type 2 diabetes mellitus (H)      Past Surgical History:   Procedure Laterality Date     AMPUTATE LEG ABOVE KNEE Left 6/11/2016    Procedure: AMPUTATE LEG ABOVE KNEE;  Surgeon: Mello Rodriguez MD;  [facility]     AMPUTATE LEG BELOW KNEE Right 11/7/2016    Procedure: AMPUTATE LEG BELOW KNEE;  Surgeon:  Savannah Durant MD;  [facility]     AMPUTATE REVISION STUMP LOWER EXTREMITY Right 11/11/2016    Procedure: AMPUTATE REVISION STUMP LOWER EXTREMITY;  Surgeon: Savannah Durant MD;  [facility]     AMPUTATE REVISION STUMP LOWER EXTREMITY Right 11/16/2016    Procedure: AMPUTATE REVISION STUMP LOWER EXTREMITY;  Surgeon: Savannah Durant MD;  [facility]     AMPUTATE TOE(S) Right 1/5/2016    Procedure: AMPUTATE TOE(S);  Surgeon: Mello Gaines DPM;  Location: [facility]     ANGIOGRAM Bilateral 11/21/2014    Procedure: ANGIOGRAM;  Surgeon: Savannah Durant MD;  [facility]     ANGIOGRAM Left 1/16/2015    Procedure: ANGIOGRAM;  Surgeon: Savannah Durant MD;  [facility]     ANGIOGRAM Bilateral 9/14/2015    Procedure: ANGIOGRAM;  Surgeon: Savannah Durant MD;  [facility]     ANGIOGRAM Left 10/12/2015    Procedure: ANGIOGRAM;  Surgeon: Savannah Durant MD;  [facility]     ANGIOGRAM Right 6/6/2016    Procedure: ANGIOGRAM;  Surgeon: Savannah Durant MD;  [facility]     ANGIOPLASTY Right 6/6/2016    Procedure: ANGIOPLASTY;  Surgeon: Savannah Durant MD;  [facility]     APPENDECTOMY       BREAST BIOPSY, RT/LT Right     benign     CATARACT IOL, RT/LT Right      CHOLECYSTECTOMY       COLONOSCOPY N/A 8/25/2014    Procedure: COLONOSCOPY;  Surgeon: Mello Ferrer MD;  [facility]     COLONOSCOPY WITH CO2 INSUFFLATION N/A 8/20/2014    Procedure: COLONOSCOPY WITH CO2 INSUFFLATION;  Surgeon: Duane, William Charles, MD;  [facility]: [facility]     CYSTOSCOPY, INJECT BOTOX N/A 5/21/2020    Procedure: CYSTOSCOPY, WITH BOTULINUM TOXIN INJECTION;  Surgeon: Loki Gordon MD;  [facility]     CYSTOSCOPY, INJECT BOTOX N/A 10/8/2020    Procedure: CYSTOSCOPY, INJECT BOTOX INTO BLADDER, SUPRAPUBIC TUBE EXCHNAGE;  Surgeon: Loki Gordon MD;  [facility]     CYSTOSCOPY, INTRAVESICAL INJECTION N/A 10/31/2019    Procedure: CYSTOSCOPY, BOTOX INJECTION, Suprapubic Catheter Exchange;  Surgeon: Loki Gordon  MD Kunal;  [facility]     CYSTOSTOMY, INSERT TUBE SUPRAPUBIC, COMBINED N/A 1/16/2018    Procedure: COMBINED CYSTOSTOMY, INSERT TUBE SUPRAPUBIC;  Cystoscopy, Intraoperative Ultrasound, Suprapubic Tube Placement;  Surgeon: Keanu Dawson MD;  [facility]     ENDARTERECTOMY FEMORAL  5/23/2014    Procedure: ENDARTERECTOMY FEMORAL;  Surgeon: Jason Joshi MD;  [facility]     ESOPHAGOSCOPY, GASTROSCOPY, DUODENOSCOPY (EGD), COMBINED  12/14/2012    Procedure: COMBINED ESOPHAGOSCOPY, GASTROSCOPY, DUODENOSCOPY (EGD), BIOPSY SINGLE OR MULTIPLE;  ESOPHAGOSCOPY, GASTROSCOPY, DUODENOSCOPY (EGD), DILATATION ;  Surgeon: Elizabeth Stevenson MD;  Location:  GI     ESOPHAGOSCOPY, GASTROSCOPY, DUODENOSCOPY (EGD), COMBINED  12/31/2013    Procedure: COMBINED ESOPHAGOSCOPY, GASTROSCOPY, DUODENOSCOPY (EGD), BIOPSY SINGLE OR MULTIPLE;;  Surgeon: Clemente Lopez MD;  [facility]     ESOPHAGOSCOPY, GASTROSCOPY, DUODENOSCOPY (EGD), COMBINED  4/1/2014    Procedure: COMBINED ESOPHAGOSCOPY, GASTROSCOPY, DUODENOSCOPY (EGD);;  Surgeon: Clemente Lopez MD;  [facility]     ESOPHAGOSCOPY, GASTROSCOPY, DUODENOSCOPY (EGD), COMBINED  6/28/2014    Procedure: COMBINED ESOPHAGOSCOPY, GASTROSCOPY, DUODENOSCOPY (EGD);  Surgeon: Clemente Lopez MD;  [facility]     ESOPHAGOSCOPY, GASTROSCOPY, DUODENOSCOPY (EGD), COMBINED N/A 8/20/2014    Procedure: COMBINED ESOPHAGOSCOPY, GASTROSCOPY, DUODENOSCOPY (EGD), BIOPSY SINGLE OR MULTIPLE;  Surgeon: Duane, William Charles, MD;  [facility]     ESOPHAGOSCOPY, GASTROSCOPY, DUODENOSCOPY (EGD), COMBINED N/A 8/22/2014    Procedure: COMBINED ESOPHAGOSCOPY, GASTROSCOPY, DUODENOSCOPY (EGD), BIOPSY SINGLE OR MULTIPLE;  Surgeon: Mello Ferrer MD;  [facility]     ESOPHAGOSCOPY, GASTROSCOPY, DUODENOSCOPY (EGD), COMBINED N/A 10/2/2014    Procedure: COMBINED ESOPHAGOSCOPY, GASTROSCOPY, DUODENOSCOPY (EGD), BIOPSY SINGLE OR MULTIPLE;  Surgeon: Remy Haskins MD;   [facility]     ESOPHAGOSCOPY, GASTROSCOPY, DUODENOSCOPY (EGD), COMBINED Left 12/15/2014    Procedure: COMBINED ESOPHAGOSCOPY, GASTROSCOPY, DUODENOSCOPY (EGD), BIOPSY SINGLE OR MULTIPLE;  Surgeon: Remy Haskins MD;  [facility]     ESOPHAGOSCOPY, GASTROSCOPY, DUODENOSCOPY (EGD), COMBINED N/A 2/25/2015    Procedure: COMBINED ENDOSCOPIC ULTRASOUND, ESOPHAGOSCOPY, GASTROSCOPY, DUODENOSCOPY (EGD), FINE NEEDLE ASPIRATE/BIOPSY;  Surgeon: Clemente Lugo MD;  [facility]     ESOPHAGOSCOPY, GASTROSCOPY, DUODENOSCOPY (EGD), COMBINED Left 2/25/2015    Procedure: COMBINED ESOPHAGOSCOPY, GASTROSCOPY, DUODENOSCOPY (EGD), BIOPSY SINGLE OR MULTIPLE;  Surgeon: Clemente Lugo MD;  [facility]     ESOPHAGOSCOPY, GASTROSCOPY, DUODENOSCOPY (EGD), COMBINED N/A 9/25/2016    Procedure: COMBINED ESOPHAGOSCOPY, GASTROSCOPY, DUODENOSCOPY (EGD);  Surgeon: Aziza Patiño MD;  [facility]     ESOPHAGOSCOPY, GASTROSCOPY, DUODENOSCOPY (EGD), COMBINED N/A 1/18/2017    Procedure: COMBINED ESOPHAGOSCOPY, GASTROSCOPY, DUODENOSCOPY (EGD), BIOPSY SINGLE OR MULTIPLE;  Surgeon: Clemente Lopez MD;  [facility]     ESOPHAGOSCOPY, GASTROSCOPY, DUODENOSCOPY (EGD), COMBINED N/A 11/26/2017    Procedure: COMBINED ESOPHAGOSCOPY, GASTROSCOPY, DUODENOSCOPY (EGD), REMOVE FOREIGN BODY;  Esophagogastroduodenoscopy with foreign body extraction  ;  Surgeon: Herberth Castrejon MD;  [facility]     ESOPHAGOSCOPY, GASTROSCOPY, DUODENOSCOPY (EGD), COMBINED N/A 11/26/2017    Procedure: COMBINED ESOPHAGOSCOPY, GASTROSCOPY, DUODENOSCOPY (EGD), REMOVE FOREIGN BODY;;  Surgeon: Herberth Castrejon MD;  [facility]     ESOPHAGOSCOPY, GASTROSCOPY, DUODENOSCOPY (EGD), COMBINED N/A 4/10/2020    Procedure: ESOPHAGOGASTRODUODENOSCOPY (EGD);  Surgeon: Yael Cabral MD;  [facility]     FASCIOTOMY LOWER EXTREMITY Left 6/10/2016    Procedure: FASCIOTOMY LOWER EXTREMITY;  Surgeon: Mello Rodriguez MD;  [facility]      HC CAPSULE ENDOSCOPY N/A 8/25/2014    Procedure: CAPSULE/PILL CAM ENDOSCOPY;  Surgeon: Remy Haksins MD;  [facility]     HC CAPSULE ENDOSCOPY N/A 10/2/2014    Procedure: CAPSULE/PILL CAM ENDOSCOPY;  Surgeon: Remy Haskins MD;  [facility]     INJECT BLOCK MEDIAL BRANCH CERVICAL/THORACIC/LUMBAR Left 10/30/2020    Procedure: Cervical 3, 4, and 5 Medial Branch Block;  Surgeon: Evelyn Lima MD;  Location: [facility] OR     ORTHOPEDIC SURGERY      broken wrist repair     REVISE CATHETER INTRATHECAL  08/24/2020     SEX TRANSFORMATION SURGERY, MALE TO FEMALE  1974 1974     SINUS SURGERY      cyst removed     TONSILLECTOMY       VASCULAR SURGERY  2006    Left carotid stent     Current Outpatient Medications   Medication Sig Dispense Refill     albuterol (PROAIR HFA/PROVENTIL HFA/VENTOLIN HFA) 108 (90 Base) MCG/ACT inhaler Inhale 2 puffs into the lungs every 6 hours 1 Inhaler 5     ACETAMINOPHEN EXTRA STRENGTH 500 MG tablet TAKE 1 TABLET BY MOUTH EVERY 6 HOURS AS NEEDED FOR PAIN / FEVER 90 tablet 5     ADVAIR DISKUS 250-50 MCG/DOSE inhaler INHALE 1 PUFF BY MOUTH TWICE DAILY 1 Inhaler 5     albuterol (PROVENTIL) (2.5 MG/3ML) 0.083% neb solution Take 1 vial (2.5 mg) by nebulization every 6 hours as needed for shortness of breath / dyspnea or wheezing 180 mL 3     alendronate (FOSAMAX) 70 MG tablet TAKE ONE TABLET BY MOUTH EVERY 7 DAYS (TAKE WITH 8OZ OF WATER 30 MINUTES BEFORE BREAKFAST AND REMAIN UPRIGHT DURING THIS TIME) 4 tablet 97     ASPERCREME LIDOCAINE 4 % Patch APPLY 1 PATCH TOPICALLY TO LOWER BACK ONCE DAILY (ON FOR 12 HOURS, OFF FOR 12 HOURS) 30 patch 10     ASPIRIN LOW DOSE 81 MG chewable tablet CHEW AND SWALLOW ONE TABLET BY MOUTH IN THE MORNING 28 tablet 10     atorvastatin (LIPITOR) 40 MG tablet TAKE 1 TABLET BY MOUTH AT BEDTIME 28 tablet 10     blood glucose monitoring (ONE TOUCH ULTRA 2) meter device kit Use to test blood sugars 3 times daily or as directed. 1 kit 0     blood  glucose monitoring (ONE TOUCH ULTRA) test strip Use to test blood sugars 3 times daily or as directed. 3 Box 3     blood glucose monitoring (ONE TOUCH ULTRASOFT) lancets Use to test blood sugar 3 times daily or as directed. 100 each PRN     brimonidine (ALPHAGAN) 0.2 % ophthalmic solution Place 1 drop into the right eye 2 times daily ( 12 hours apart).    Please keep 11-16-20 appt for refills. 5 mL 4     capsaicin-menthol-methyl sal 0.025-1-12 % external cream Apply 1 Application topically daily as needed (topical pain) 56.6 g 1     diclofenac (VOLTAREN) 1 % topical gel APPLY 2 GRAMS TOPICALLY TO AFFECTED AREA(S) FOUR TIMES A  g 11     dorzolamide (TRUSOPT) 2 % ophthalmic solution Place 1 drop into the right eye 2 times daily 10 mL 2     dorzolamide (TRUSOPT) 2 % ophthalmic solution Place 1 drop into the right eye 2 times daily 1 Bottle 1     EPINEPHrine (ANY BX GENERIC EQUIV) 0.3 MG/0.3ML injection 2-pack INJECT 0.3 ML INTO THE MUSCLE AS NEEDED FOR ANAPHYLAXIS 2 each 1     escitalopram (LEXAPRO) 10 MG tablet TAKE 1 TABLET BY MOUTH ONCE DAILY 28 tablet 10     FEROSUL 325 (65 Fe) MG tablet TAKE 1 TABLET BY MOUTH TWICE DAILY WITH MEALS 56 tablet 10     fluticasone (FLONASE) 50 MCG/ACT nasal spray SPRAY 2 SPRAYS IN EACH NOSTRIL DAILY 16 g 11     INCRUSE ELLIPTA 62.5 MCG/INH Inhaler INHALE 1 PUFF BY MOUTH ONCE DAILY 30 each 10     lactulose (CHRONULAC) 10 GM/15ML solution TAKE 30ML (20MG) BY MOUTH AS NEEDED FOR CONSTIPATION 473 mL 1     latanoprost (XALATAN) 0.005 % ophthalmic solution INSTILL ONE DROP IN EACH EYE AT BEDTIME 2.5 mL 10     levETIRAcetam (KEPPRA) 500 MG tablet TAKE 1 TABLET BY MOUTH TWICE DAILY 56 tablet 11     lisinopril (ZESTRIL) 20 MG tablet TAKE 1 TABLET BY MOUTH EVERY MORNING 28 tablet 11     loratadine (CLARITIN) 10 MG tablet TAKE 1 TABLET BY MOUTH ONCE DAILY 28 tablet 11     Menthol, Topical Analgesic, 5 % GEL Externally apply topically daily as needed 113 g 0     metFORMIN (GLUCOPHAGE) 500  MG tablet TAKE 1 TABLET BY MOUTH EVERY EVENING WITH DINNER 28 tablet 11     metoprolol succinate ER (TOPROL-XL) 50 MG 24 hr tablet TAKE 1 TABLET BY MOUTH EVERY MORNING 30 tablet 10     Multiple Vitamins-Minerals (TAB-A-MACY) TABS TAKE 1 TABLET BY MOUTH ONCE DAILY 28 tablet 11     multivitamin, therapeutic (THERA-VIT) TABS tablet Take 1 tablet by mouth daily       nicotine (COMMIT) 2 MG lozenge Place 1 lozenge (2 mg) inside cheek every hour as needed for smoking cessation (Patient not taking: Reported on 12/14/2020) 60 lozenge 11     nicotine (NICODERM CQ) 14 MG/24HR 24 hr patch APPLY 1 PATCH TOPICALLY DAILY ( EVERY 24 HOURS ) 28 patch 3     nitroGLYcerin (NITROSTAT) 0.4 MG sublingual tablet DISSOLVE ONE TABLET UNDER TONGUE AS NEEDED FOR CHEST PAIN MAY REPEAT EVERY 5 MINUTES FOR 3 DOSES, IF SYMPTOMS PERSIST CALL 911 25 tablet 3     Nutritional Supplements (ENSURE) LIQD DRINK ONE CAN / BOTTLE BY MOUTH TWICE DAILY WITH MEALS 99555 mL 11     pantoprazole (PROTONIX) 40 MG EC tablet TAKE 1 TABLET BY MOUTH EVERY MORNING 28 tablet 11     phenytoin (DILANTIN) 100 MG capsule TAKE 2 CAPS (200MG) BY MOUTH TWICE A  capsule 11     phenytoin sodium extended (DILANTIN) 200 MG capsule TAKE 1 CAPSULE BY MOUTH TWICE DAILY 56 capsule 11     polyethylene glycol (MIRALAX) 17 GM/SCOOP powder MIX 17GM OF POWDER IN 8OZ OF WATER UNTIL COMPLETELY DISSOLVED. DRINK SOLUTION BY MOUTH IN THE MORNING 510 g 8     pregabalin (LYRICA) 150 MG capsule TAKE 1 CAPSULE BY MOUTH THREE TIMES DAILY 90 capsule 4     risperiDONE (RISPERDAL) 0.5 MG tablet TAKE 1 TABLET BY MOUTH AT BEDTIME 28 tablet 4     SENEXON-S 8.6-50 MG tablet TAKE 1 TABLET BY MOUTH TWICE DAILY 56 tablet 10     sennosides (SENOKOT) 8.6 MG tablet Take 1 tablet by mouth 2 times daily        solifenacin (VESICARE) 10 MG tablet TAKE 1 TABLET BY MOUTH EVERY MORNING 28 tablet 10     traZODone (DESYREL) 150 MG tablet TAKE 1 TABLET BY MOUTH AT BEDTIME 28 tablet 10     vitamin D3  (CHOLECALCIFEROL) 10 MCG (400 UNIT) capsule Take 2 capsules by mouth daily       Vitamin D3 (VITAMIN D) 10 MCG (400 UNIT) tablet TAKE TWO TABLETS (800 UNITS) BY MOUTH ONCE DAILY 56 tablet 11       Allergies   Allergen Reactions     Bee Venom Anaphylaxis     Penicillins Anaphylaxis     Dilantin [Phenytoin] Other (See Comments)     Generic dilantin only per pt     Iodine Hives     Novocaine [Procaine] Hives     Tositumomab Unknown        Social History     Tobacco Use     Smoking status: Current Every Day Smoker     Packs/day: 1.00     Years: 30.00     Pack years: 30.00     Types: Cigarettes, Cigars     Smokeless tobacco: Never Used     Tobacco comment: 1.5 packs per day    Substance Use Topics     Alcohol use: No     Alcohol/week: 0.0 standard drinks       History   Drug Use No         Objective     /68   Pulse 61   Temp 97.7  F (36.5  C) (Temporal)   Resp 16   Wt 56.7 kg (125 lb)   SpO2 96%   BMI 47.53 kg/m      Physical Exam    GENERAL APPEARANCE: alert and no distress     EYES: EOMI, PERRL     HENT: ear canals and TM's normal and nose and mouth without ulcers or lesions     NECK: no adenopathy, no asymmetry, masses, or scars and thyroid normal to palpation     RESP: lungs clear to auscultation - no rales, rhonchi or wheezes     CV: regular rates and rhythm, normal S1 S2, no S3 or S4 and no click or rub     MS: Patient in motorized wheelchair with bilateral AKA's     NEURO: No focal changes     PSYCH: mentation appears normal and affect normal/bright    Recent Labs   Lab Test 11/04/20  1211 10/01/20  1115 08/19/20  1433 06/12/20  0421 06/11/20  0548 06/11/20  0000 12/16/19  1258 12/16/19  1258   HGB 12.7 12.2 13.0 11.2* 11.7  --    < >  --    PLT  --  240  --  253 287  --    < >  --    INR  --   --   --   --  1.12 1.06  --   --    NA  --   --  134 141 137 137   < >  --    POTASSIUM  --  4.2 4.2 3.8 4.6 4.2   < >  --    CR  --   --  0.52 0.49* 0.57 0.51*   < >  --    A1C  --   --  5.0  --   --   --   --   5.0    < > = values in this interval not displayed.        Diagnostics:  Labs pending at this time.  Results will be reviewed when available.   EKG: Sinus rhythm with a heart rate of 61 no acute changes from baseline, unchanged from previous tracings    Revised Cardiac Risk Index (RCRI):  The patient has the following serious cardiovascular risks for perioperative complications:   - Coronary Artery Disease (MI, positive stress test, angina, Qs on EKG) = 1 point     RCRI Interpretation: 2 points: Class III (moderate risk - 6.6% complication rate)  956}     Assessment & Plan   The proposed surgical procedure is considered LOW risk.    Preop general physical exam  Surgery as scheduled    Neurogenic bladder  Stable as noted per baseline    Type 2 diabetes mellitus with other circulatory complication, without long-term current use of insulin (H)  Lab Results   Component Value Date    A1C 5.0 08/19/2020    A1C 5.0 12/16/2019    A1C 5.0 04/17/2019    A1C 5.1 06/06/2018    A1C 4.4 05/02/2018     Stable at goal    Chronic pain syndrome  Noted as baseline    Seizure disorder (H)  Stable w/o active complaints    Benign essential hypertension  At goal on therapy as noted    History of coronary artery disease  Stable w/o active complaints    MEDICATION INSTRUCTIONS:     Anticoagulant or Antiplatelet Medication Use   - Bleeding risk is low for this procedure (e.g. dental, skin, cataract).  Would advise holding all anticoagulants per surgery recommendations     DIABETIC Medications:   - METFORMIN: HOLD day of surgery.(AM)    Suggest mechanical soft or pureed diet.    Advised to stop all anti-inflammatories prior to surgical procedure as needed if taking.     RECOMMENDATION:  APPROVAL GIVEN to proceed with proposed procedure, without further diagnostic evaluation.     Labs reviewed and forms + prior EKG will be faxed onced labs available as needed.     Return for f/u with surgery for routine post-op.      Copy of this  evaluation report is provided to requesting physician, Dr. Jauregui     Signed Electronically by: Allan Casey MD    [facility] Preop Guidelines    Revised Cardiac Risk Index

## 2020-12-25 ENCOUNTER — APPOINTMENT (OUTPATIENT)
Dept: CT IMAGING | Facility: CLINIC | Age: 70
End: 2020-12-25
Attending: EMERGENCY MEDICINE
Payer: COMMERCIAL

## 2020-12-25 ENCOUNTER — HOSPITAL ENCOUNTER (OUTPATIENT)
Facility: CLINIC | Age: 70
Setting detail: OBSERVATION
Discharge: NURSING, REHAB OR LONG-TERM CARE | End: 2020-12-26
Attending: EMERGENCY MEDICINE | Admitting: EMERGENCY MEDICINE
Payer: COMMERCIAL

## 2020-12-25 DIAGNOSIS — Z79.84 LONG TERM CURRENT USE OF ORAL HYPOGLYCEMIC DRUG: ICD-10-CM
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
## 2020-12-25 DIAGNOSIS — R42 DIZZINESS: ICD-10-CM

## 2020-12-25 DIAGNOSIS — E11.9 TYPE 2 DIABETES MELLITUS WITHOUT COMPLICATION, WITHOUT LONG-TERM CURRENT USE OF INSULIN (H): ICD-10-CM

## 2020-12-25 DIAGNOSIS — Z20.828 EXPOSURE TO SARS-ASSOCIATED CORONAVIRUS: ICD-10-CM

## 2020-12-25 DIAGNOSIS — R55 SYNCOPE AND COLLAPSE: ICD-10-CM

## 2020-12-25 DIAGNOSIS — N30.00 ACUTE CYSTITIS WITHOUT HEMATURIA: Primary | ICD-10-CM

## 2020-12-25 PROCEDURE — 72128 CT CHEST SPINE W/O DYE: CPT | Mod: 26 | Performed by: RADIOLOGY

## 2020-12-25 PROCEDURE — 96374 THER/PROPH/DIAG INJ IV PUSH: CPT | Performed by: EMERGENCY MEDICINE

## 2020-12-25 PROCEDURE — 70450 CT HEAD/BRAIN W/O DYE: CPT | Mod: 26 | Performed by: RADIOLOGY

## 2020-12-25 PROCEDURE — 72125 CT NECK SPINE W/O DYE: CPT | Mod: 26 | Performed by: RADIOLOGY

## 2020-12-25 PROCEDURE — 99220 PR INITIAL OBSERVATION CARE,LEVEL III: CPT | Performed by: PHYSICIAN ASSISTANT

## 2020-12-25 PROCEDURE — 70450 CT HEAD/BRAIN W/O DYE: CPT

## 2020-12-25 PROCEDURE — 72125 CT NECK SPINE W/O DYE: CPT

## 2020-12-25 PROCEDURE — 72128 CT CHEST SPINE W/O DYE: CPT

## 2020-12-25 PROCEDURE — 99285 EMERGENCY DEPT VISIT HI MDM: CPT | Mod: 25 | Performed by: EMERGENCY MEDICINE

## 2020-12-25 PROCEDURE — 93010 ELECTROCARDIOGRAM REPORT: CPT | Performed by: EMERGENCY MEDICINE

## 2020-12-25 PROCEDURE — 682N000003 HC TRAUMA EVALUATION W/O CC LEVEL II: Performed by: EMERGENCY MEDICINE

## 2020-12-25 PROCEDURE — 93005 ELECTROCARDIOGRAM TRACING: CPT | Performed by: EMERGENCY MEDICINE

## 2020-12-25 RX ORDER — FENTANYL CITRATE 50 UG/ML
12.5 INJECTION, SOLUTION INTRAMUSCULAR; INTRAVENOUS ONCE
Status: COMPLETED | OUTPATIENT
Start: 2020-12-25 | End: 2020-12-26

## 2020-12-25 ASSESSMENT — MIFFLIN-ST. JEOR: SCORE: 733.63

## 2020-12-25 NOTE — LETTER
Health Information Management Services               Recipient:  [facility]          Sender:  NAOMI Mayes, LGSW  [facility]  [employer]   [phone]        Date: December 26, 2020  Patient Name:  Sonya Foote  Routing Message:  Discharge order for Sonya, discharging at 12:30 from [facility] observation unit          The documents accompanying this notice contain confidential information belonging to the sender.  This information is intended only for the use of the individual or entity named above.  The authorized recipient of this information is prohibited from disclosing this information to any other party and is required to destroy the information after its stated need has been fulfilled, unless otherwise required by state law.      If you are not the intended recipient, you are hereby notified that any disclosure, copy, distribution or action taken in reliance on the contents of these documents is strictly prohibited.  If you have received this document in error, please return it by fax to [phone] with a note on the cover sheet explaining why you are returning it (e.g. not your patient, not your provider, etc.).  If you need further assistance, please call [facility] Centralized Transcription at [phone].  Documents may also be returned by mail to [employer], , [facility] [address] [place], [place] [address].

## 2020-12-26 ENCOUNTER — MEDICAL CORRESPONDENCE (OUTPATIENT)
Dept: HEALTH INFORMATION MANAGEMENT | Facility: CLINIC | Age: 70
End: 2020-12-26

## 2020-12-26 ENCOUNTER — APPOINTMENT (OUTPATIENT)
Dept: GENERAL RADIOLOGY | Facility: CLINIC | Age: 70
End: 2020-12-26
Attending: EMERGENCY MEDICINE
Payer: COMMERCIAL

## 2020-12-26 ENCOUNTER — APPOINTMENT (OUTPATIENT)
Dept: CARDIOLOGY | Facility: CLINIC | Age: 70
End: 2020-12-26
Payer: COMMERCIAL

## 2020-12-26 VITALS
HEIGHT: 55 IN | SYSTOLIC BLOOD PRESSURE: 107 MMHG | OXYGEN SATURATION: 99 % | DIASTOLIC BLOOD PRESSURE: 65 MMHG | WEIGHT: 125 LBS | RESPIRATION RATE: 16 BRPM | HEART RATE: 69 BPM | TEMPERATURE: 98.4 F | BODY MASS INDEX: 28.93 KG/M2
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
## 2020-12-26 LAB
ALBUMIN SERPL-MCNC: 2.9 G/DL (ref 3.4–5)
ALBUMIN UR-MCNC: NEGATIVE MG/DL
ALP SERPL-CCNC: 254 U/L (ref 40–150)
ALT SERPL W P-5'-P-CCNC: 78 U/L (ref 0–50)
AMORPH CRY #/AREA URNS HPF: ABNORMAL /HPF
ANION GAP SERPL CALCULATED.3IONS-SCNC: 3 MMOL/L (ref 3–14)
APPEARANCE UR: CLEAR
AST SERPL W P-5'-P-CCNC: 55 U/L (ref 0–45)
BACTERIA #/AREA URNS HPF: ABNORMAL /HPF
BASOPHILS # BLD AUTO: 0 10E9/L (ref 0–0.2)
BASOPHILS NFR BLD AUTO: 0.4 %
BILIRUB SERPL-MCNC: 0.2 MG/DL (ref 0.2–1.3)
BILIRUB UR QL STRIP: NEGATIVE
BUN SERPL-MCNC: 11 MG/DL (ref 7–30)
CALCIUM SERPL-MCNC: 8.6 MG/DL (ref 8.5–10.1)
CHLORIDE SERPL-SCNC: 107 MMOL/L (ref 94–109)
CO2 SERPL-SCNC: 29 MMOL/L (ref 20–32)
COLOR UR AUTO: ABNORMAL
CREAT SERPL-MCNC: 0.57 MG/DL (ref 0.52–1.04)
DIFFERENTIAL METHOD BLD: NORMAL
EOSINOPHIL # BLD AUTO: 0.5 10E9/L (ref 0–0.7)
EOSINOPHIL NFR BLD AUTO: 4.7 %
ERYTHROCYTE [DISTWIDTH] IN BLOOD BY AUTOMATED COUNT: 13.2 % (ref 10–15)
GFR SERPL CREATININE-BSD FRML MDRD: >90 ML/MIN/{1.73_M2}
GLUCOSE BLDC GLUCOMTR-MCNC: 106 MG/DL (ref 70–99)
GLUCOSE BLDC GLUCOMTR-MCNC: 120 MG/DL (ref 70–99)
GLUCOSE BLDC GLUCOMTR-MCNC: 85 MG/DL (ref 70–99)
GLUCOSE SERPL-MCNC: 96 MG/DL (ref 70–99)
GLUCOSE UR STRIP-MCNC: NEGATIVE MG/DL
HBA1C MFR BLD: 5.1 % (ref 0–5.6)
HCT VFR BLD AUTO: 35.9 % (ref 35–47)
HGB BLD-MCNC: 12 G/DL (ref 11.7–15.7)
HGB UR QL STRIP: ABNORMAL
IMM GRANULOCYTES # BLD: 0 10E9/L (ref 0–0.4)
IMM GRANULOCYTES NFR BLD: 0.3 %
INR PPP: 1.09 (ref 0.86–1.14)
INTERPRETATION ECG - MUSE: NORMAL
KETONES UR STRIP-MCNC: NEGATIVE MG/DL
LABORATORY COMMENT REPORT: NORMAL
LACTATE BLD-SCNC: 1 MMOL/L (ref 0.7–2)
LEUKOCYTE ESTERASE UR QL STRIP: ABNORMAL
LYMPHOCYTES # BLD AUTO: 1.9 10E9/L (ref 0.8–5.3)
LYMPHOCYTES NFR BLD AUTO: 19.4 %
MAGNESIUM SERPL-MCNC: 2.1 MG/DL (ref 1.6–2.3)
MCH RBC QN AUTO: 30 PG (ref 26.5–33)
MCHC RBC AUTO-ENTMCNC: 33.4 G/DL (ref 31.5–36.5)
MCV RBC AUTO: 90 FL (ref 78–100)
MONOCYTES # BLD AUTO: 1.1 10E9/L (ref 0–1.3)
MONOCYTES NFR BLD AUTO: 11.7 %
NEUTROPHILS # BLD AUTO: 6 10E9/L (ref 1.6–8.3)
NEUTROPHILS NFR BLD AUTO: 63.5 %
NITRATE UR QL: POSITIVE
NRBC # BLD AUTO: 0 10*3/UL
NRBC BLD AUTO-RTO: 0 /100
PH UR STRIP: 6.5 PH (ref 5–7)
PHENYTOIN SERPL-MCNC: 25 MG/L (ref 10–20)
PHOSPHATE SERPL-MCNC: 3.9 MG/DL (ref 2.5–4.5)
PLATELET # BLD AUTO: 214 10E9/L (ref 150–450)
POTASSIUM SERPL-SCNC: 3.9 MMOL/L (ref 3.4–5.3)
PROT SERPL-MCNC: 7.3 G/DL (ref 6.8–8.8)
RBC # BLD AUTO: 4 10E12/L (ref 3.8–5.2)
RBC #/AREA URNS AUTO: 1 /HPF (ref 0–2)
SARS-COV-2 RNA SPEC QL NAA+PROBE: NEGATIVE
SODIUM SERPL-SCNC: 139 MMOL/L (ref 133–144)
SOURCE: ABNORMAL
SP GR UR STRIP: 1.01 (ref 1–1.03)
SPECIMEN SOURCE: NORMAL
TROPONIN I SERPL-MCNC: <0.015 UG/L (ref 0–0.04)
TSH SERPL DL<=0.005 MIU/L-ACNC: 0.95 MU/L (ref 0.4–4)
UROBILINOGEN UR STRIP-MCNC: NORMAL MG/DL (ref 0–2)
WBC # BLD AUTO: 9.5 10E9/L (ref 4–11)
WBC #/AREA URNS AUTO: 13 /HPF (ref 0–5)

## 2020-12-26 PROCEDURE — 84100 ASSAY OF PHOSPHORUS: CPT | Performed by: PHYSICIAN ASSISTANT

## 2020-12-26 PROCEDURE — 71045 X-RAY EXAM CHEST 1 VIEW: CPT | Mod: 26 | Performed by: RADIOLOGY

## 2020-12-26 PROCEDURE — 94640 AIRWAY INHALATION TREATMENT: CPT | Mod: 76

## 2020-12-26 PROCEDURE — U0003 INFECTIOUS AGENT DETECTION BY NUCLEIC ACID (DNA OR RNA); SEVERE ACUTE RESPIRATORY SYNDROME CORONAVIRUS 2 (SARS-COV-2) (CORONAVIRUS DISEASE [COVID-19]), AMPLIFIED PROBE TECHNIQUE, MAKING USE OF HIGH THROUGHPUT TECHNOLOGIES AS DESCRIBED BY CMS-2020-01-R: HCPCS | Performed by: EMERGENCY MEDICINE

## 2020-12-26 PROCEDURE — 83036 HEMOGLOBIN GLYCOSYLATED A1C: CPT | Performed by: EMERGENCY MEDICINE

## 2020-12-26 PROCEDURE — 250N000011 HC RX IP 250 OP 636: Performed by: EMERGENCY MEDICINE

## 2020-12-26 PROCEDURE — 93306 TTE W/DOPPLER COMPLETE: CPT | Mod: 26 | Performed by: STUDENT IN AN ORGANIZED HEALTH CARE EDUCATION/TRAINING PROGRAM

## 2020-12-26 PROCEDURE — 83605 ASSAY OF LACTIC ACID: CPT | Performed by: EMERGENCY MEDICINE

## 2020-12-26 PROCEDURE — 87086 URINE CULTURE/COLONY COUNT: CPT | Performed by: EMERGENCY MEDICINE

## 2020-12-26 PROCEDURE — 250N000009 HC RX 250: Performed by: PHYSICIAN ASSISTANT

## 2020-12-26 PROCEDURE — 99217 PR OBSERVATION CARE DISCHARGE: CPT | Performed by: EMERGENCY MEDICINE

## 2020-12-26 PROCEDURE — 94640 AIRWAY INHALATION TREATMENT: CPT

## 2020-12-26 PROCEDURE — G0378 HOSPITAL OBSERVATION PER HR: HCPCS

## 2020-12-26 PROCEDURE — 81001 URINALYSIS AUTO W/SCOPE: CPT | Performed by: EMERGENCY MEDICINE

## 2020-12-26 PROCEDURE — 250N000013 HC RX MED GY IP 250 OP 250 PS 637: Performed by: PHYSICIAN ASSISTANT

## 2020-12-26 PROCEDURE — 71045 X-RAY EXAM CHEST 1 VIEW: CPT

## 2020-12-26 PROCEDURE — 93306 TTE W/DOPPLER COMPLETE: CPT

## 2020-12-26 PROCEDURE — 999N001017 HC STATISTIC GLUCOSE BY METER IP

## 2020-12-26 PROCEDURE — 83735 ASSAY OF MAGNESIUM: CPT | Performed by: PHYSICIAN ASSISTANT

## 2020-12-26 PROCEDURE — 80053 COMPREHEN METABOLIC PANEL: CPT | Performed by: EMERGENCY MEDICINE

## 2020-12-26 PROCEDURE — 999N000157 HC STATISTIC RCP TIME EA 10 MIN

## 2020-12-26 PROCEDURE — 99231 SBSQ HOSP IP/OBS SF/LOW 25: CPT | Performed by: PHYSICIAN ASSISTANT

## 2020-12-26 PROCEDURE — 36415 COLL VENOUS BLD VENIPUNCTURE: CPT | Performed by: PHYSICIAN ASSISTANT

## 2020-12-26 PROCEDURE — 96374 THER/PROPH/DIAG INJ IV PUSH: CPT | Mod: 59 | Performed by: EMERGENCY MEDICINE

## 2020-12-26 PROCEDURE — C9803 HOPD COVID-19 SPEC COLLECT: HCPCS | Performed by: EMERGENCY MEDICINE

## 2020-12-26 PROCEDURE — 999N000127 HC STATISTIC PERIPHERAL IV START W US GUIDANCE

## 2020-12-26 PROCEDURE — 80177 DRUG SCRN QUAN LEVETIRACETAM: CPT | Performed by: PHYSICIAN ASSISTANT

## 2020-12-26 PROCEDURE — 84484 ASSAY OF TROPONIN QUANT: CPT | Performed by: PHYSICIAN ASSISTANT

## 2020-12-26 PROCEDURE — 73502 X-RAY EXAM HIP UNI 2-3 VIEWS: CPT | Mod: 26 | Performed by: RADIOLOGY

## 2020-12-26 PROCEDURE — 73502 X-RAY EXAM HIP UNI 2-3 VIEWS: CPT

## 2020-12-26 PROCEDURE — 85610 PROTHROMBIN TIME: CPT | Performed by: EMERGENCY MEDICINE

## 2020-12-26 PROCEDURE — 87186 SC STD MICRODIL/AGAR DIL: CPT | Performed by: EMERGENCY MEDICINE

## 2020-12-26 PROCEDURE — 73030 X-RAY EXAM OF SHOULDER: CPT | Mod: 26 | Performed by: RADIOLOGY

## 2020-12-26 PROCEDURE — 85025 COMPLETE CBC W/AUTO DIFF WBC: CPT | Performed by: EMERGENCY MEDICINE

## 2020-12-26 PROCEDURE — 73030 X-RAY EXAM OF SHOULDER: CPT | Mod: LT

## 2020-12-26 PROCEDURE — 80185 ASSAY OF PHENYTOIN TOTAL: CPT | Performed by: PHYSICIAN ASSISTANT

## 2020-12-26 PROCEDURE — 84484 ASSAY OF TROPONIN QUANT: CPT | Performed by: EMERGENCY MEDICINE

## 2020-12-26 PROCEDURE — 87088 URINE BACTERIA CULTURE: CPT | Performed by: EMERGENCY MEDICINE

## 2020-12-26 PROCEDURE — 99218 PR INITIAL OBSERVATION CARE,LEVEL I: CPT | Mod: GC | Performed by: NEUROLOGICAL SURGERY

## 2020-12-26 PROCEDURE — 84443 ASSAY THYROID STIM HORMONE: CPT | Performed by: PHYSICIAN ASSISTANT

## 2020-12-26 RX ORDER — IPRATROPIUM BROMIDE AND ALBUTEROL SULFATE 2.5; .5 MG/3ML; MG/3ML
3 SOLUTION RESPIRATORY (INHALATION)
Status: DISCONTINUED | OUTPATIENT
Start: 2020-12-26 | End: 2020-12-26 | Stop reason: HOSPADM

## 2020-12-26 RX ORDER — NITROGLYCERIN 0.4 MG/1
0.4 TABLET SUBLINGUAL EVERY 5 MIN PRN
Status: DISCONTINUED | OUTPATIENT
Start: 2020-12-26 | End: 2020-12-26 | Stop reason: HOSPADM

## 2020-12-26 RX ORDER — ATORVASTATIN CALCIUM 40 MG/1
40 TABLET, FILM COATED ORAL AT BEDTIME
Status: DISCONTINUED | OUTPATIENT
Start: 2020-12-26 | End: 2020-12-26 | Stop reason: HOSPADM

## 2020-12-26 RX ORDER — RISPERIDONE 0.5 MG/1
0.5 TABLET ORAL AT BEDTIME
Status: DISCONTINUED | OUTPATIENT
Start: 2020-12-26 | End: 2020-12-26 | Stop reason: HOSPADM

## 2020-12-26 RX ORDER — BUDESONIDE 1 MG/2ML
1 INHALANT ORAL 2 TIMES DAILY
Status: DISCONTINUED | OUTPATIENT
Start: 2020-12-26 | End: 2020-12-26 | Stop reason: HOSPADM

## 2020-12-26 RX ORDER — POLYETHYLENE GLYCOL 3350 17 G
2 POWDER IN PACKET (EA) ORAL
Status: DISCONTINUED | OUTPATIENT
Start: 2020-12-26 | End: 2020-12-26 | Stop reason: HOSPADM

## 2020-12-26 RX ORDER — ESCITALOPRAM OXALATE 10 MG/1
10 TABLET ORAL DAILY
Status: DISCONTINUED | OUTPATIENT
Start: 2020-12-26 | End: 2020-12-26 | Stop reason: HOSPADM

## 2020-12-26 RX ORDER — ASPIRIN 81 MG/1
81 TABLET, CHEWABLE ORAL DAILY
Status: DISCONTINUED | OUTPATIENT
Start: 2020-12-26 | End: 2020-12-26 | Stop reason: HOSPADM

## 2020-12-26 RX ORDER — MUPIROCIN 20 MG/G
1 OINTMENT TOPICAL DAILY
COMMUNITY
End: 2021-07-20

## 2020-12-26 RX ORDER — NALOXONE HYDROCHLORIDE 0.4 MG/ML
0.4 INJECTION, SOLUTION INTRAMUSCULAR; INTRAVENOUS; SUBCUTANEOUS
Status: DISCONTINUED | OUTPATIENT
Start: 2020-12-26 | End: 2020-12-26 | Stop reason: HOSPADM

## 2020-12-26 RX ORDER — CIPROFLOXACIN 500 MG/1
500 TABLET, FILM COATED ORAL 2 TIMES DAILY
Qty: 10 TABLET | Refills: 0 | Status: SHIPPED | OUTPATIENT
Start: 2020-12-26 | End: 2020-12-31

## 2020-12-26 RX ORDER — NALOXONE HYDROCHLORIDE 0.4 MG/ML
0.2 INJECTION, SOLUTION INTRAMUSCULAR; INTRAVENOUS; SUBCUTANEOUS
Status: DISCONTINUED | OUTPATIENT
Start: 2020-12-26 | End: 2020-12-26 | Stop reason: HOSPADM

## 2020-12-26 RX ORDER — ONDANSETRON 4 MG/1
4 TABLET, ORALLY DISINTEGRATING ORAL EVERY 6 HOURS PRN
Status: DISCONTINUED | OUTPATIENT
Start: 2020-12-26 | End: 2020-12-26 | Stop reason: HOSPADM

## 2020-12-26 RX ORDER — PANTOPRAZOLE SODIUM 40 MG/1
40 TABLET, DELAYED RELEASE ORAL EVERY MORNING
Status: DISCONTINUED | OUTPATIENT
Start: 2020-12-26 | End: 2020-12-26 | Stop reason: HOSPADM

## 2020-12-26 RX ORDER — DORZOLAMIDE HCL 20 MG/ML
1 SOLUTION/ DROPS OPHTHALMIC 2 TIMES DAILY
Status: DISCONTINUED | OUTPATIENT
Start: 2020-12-26 | End: 2020-12-26 | Stop reason: HOSPADM

## 2020-12-26 RX ORDER — SOLIFENACIN SUCCINATE 5 MG/1
10 TABLET, FILM COATED ORAL EVERY MORNING
Status: DISCONTINUED | OUTPATIENT
Start: 2020-12-26 | End: 2020-12-26 | Stop reason: HOSPADM

## 2020-12-26 RX ORDER — POLYETHYLENE GLYCOL 3350 17 G/17G
17 POWDER, FOR SOLUTION ORAL DAILY
Status: DISCONTINUED | OUTPATIENT
Start: 2020-12-26 | End: 2020-12-26 | Stop reason: HOSPADM

## 2020-12-26 RX ORDER — NICOTINE POLACRILEX 4 MG
15-30 LOZENGE BUCCAL
Status: DISCONTINUED | OUTPATIENT
Start: 2020-12-26 | End: 2020-12-26 | Stop reason: HOSPADM

## 2020-12-26 RX ORDER — METOPROLOL SUCCINATE 50 MG/1
50 TABLET, EXTENDED RELEASE ORAL EVERY MORNING
Status: DISCONTINUED | OUTPATIENT
Start: 2020-12-26 | End: 2020-12-26 | Stop reason: HOSPADM

## 2020-12-26 RX ORDER — AMOXICILLIN 250 MG
1 CAPSULE ORAL 2 TIMES DAILY
Status: DISCONTINUED | OUTPATIENT
Start: 2020-12-26 | End: 2020-12-26 | Stop reason: HOSPADM

## 2020-12-26 RX ORDER — PREGABALIN 75 MG/1
150 CAPSULE ORAL 3 TIMES DAILY
Status: DISCONTINUED | OUTPATIENT
Start: 2020-12-26 | End: 2020-12-26 | Stop reason: HOSPADM

## 2020-12-26 RX ORDER — ONDANSETRON 2 MG/ML
4 INJECTION INTRAMUSCULAR; INTRAVENOUS EVERY 6 HOURS PRN
Status: DISCONTINUED | OUTPATIENT
Start: 2020-12-26 | End: 2020-12-26 | Stop reason: HOSPADM

## 2020-12-26 RX ORDER — LACTULOSE 10 G/15ML
20 SOLUTION ORAL DAILY PRN
Status: DISCONTINUED | OUTPATIENT
Start: 2020-12-26 | End: 2020-12-26 | Stop reason: HOSPADM

## 2020-12-26 RX ORDER — OXYCODONE HYDROCHLORIDE 5 MG/1
5-10 TABLET ORAL EVERY 4 HOURS PRN
Status: DISCONTINUED | OUTPATIENT
Start: 2020-12-26 | End: 2020-12-26 | Stop reason: HOSPADM

## 2020-12-26 RX ORDER — ACETAMINOPHEN 650 MG/1
650 SUPPOSITORY RECTAL EVERY 4 HOURS PRN
Status: DISCONTINUED | OUTPATIENT
Start: 2020-12-26 | End: 2020-12-26 | Stop reason: HOSPADM

## 2020-12-26 RX ORDER — PHENYTOIN SODIUM 100 MG/1
200 CAPSULE, EXTENDED RELEASE ORAL 2 TIMES DAILY
Status: DISCONTINUED | OUTPATIENT
Start: 2020-12-26 | End: 2020-12-26 | Stop reason: HOSPADM

## 2020-12-26 RX ORDER — LISINOPRIL 20 MG/1
20 TABLET ORAL EVERY MORNING
Status: DISCONTINUED | OUTPATIENT
Start: 2020-12-26 | End: 2020-12-26 | Stop reason: HOSPADM

## 2020-12-26 RX ORDER — NICOTINE 21 MG/24HR
1 PATCH, TRANSDERMAL 24 HOURS TRANSDERMAL DAILY
Status: DISCONTINUED | OUTPATIENT
Start: 2020-12-26 | End: 2020-12-26 | Stop reason: HOSPADM

## 2020-12-26 RX ORDER — LIDOCAINE 40 MG/G
CREAM TOPICAL
Status: DISCONTINUED | OUTPATIENT
Start: 2020-12-26 | End: 2020-12-26 | Stop reason: HOSPADM

## 2020-12-26 RX ORDER — FERROUS SULFATE 325(65) MG
325 TABLET ORAL 2 TIMES DAILY
Status: DISCONTINUED | OUTPATIENT
Start: 2020-12-26 | End: 2020-12-26 | Stop reason: HOSPADM

## 2020-12-26 RX ORDER — TRAZODONE HYDROCHLORIDE 150 MG/1
150 TABLET ORAL AT BEDTIME
Status: DISCONTINUED | OUTPATIENT
Start: 2020-12-26 | End: 2020-12-26 | Stop reason: HOSPADM

## 2020-12-26 RX ORDER — ACETAMINOPHEN 325 MG/1
650 TABLET ORAL EVERY 6 HOURS PRN
Status: DISCONTINUED | OUTPATIENT
Start: 2020-12-26 | End: 2020-12-26 | Stop reason: HOSPADM

## 2020-12-26 RX ORDER — LORATADINE 10 MG/1
10 TABLET ORAL DAILY
Status: DISCONTINUED | OUTPATIENT
Start: 2020-12-26 | End: 2020-12-26 | Stop reason: HOSPADM

## 2020-12-26 RX ORDER — LIDOCAINE 4 G/G
1 PATCH TOPICAL EVERY 24 HOURS
Status: DISCONTINUED | OUTPATIENT
Start: 2020-12-26 | End: 2020-12-26 | Stop reason: HOSPADM

## 2020-12-26 RX ORDER — OXYCODONE HYDROCHLORIDE 5 MG/1
1-2 CAPSULE ORAL EVERY 4 HOURS PRN
COMMUNITY

## 2020-12-26 RX ORDER — BRIMONIDINE TARTRATE 2 MG/ML
1 SOLUTION/ DROPS OPHTHALMIC 2 TIMES DAILY
Status: DISCONTINUED | OUTPATIENT
Start: 2020-12-26 | End: 2020-12-26 | Stop reason: HOSPADM

## 2020-12-26 RX ORDER — LATANOPROST 50 UG/ML
1 SOLUTION/ DROPS OPHTHALMIC AT BEDTIME
Status: DISCONTINUED | OUTPATIENT
Start: 2020-12-26 | End: 2020-12-26 | Stop reason: HOSPADM

## 2020-12-26 RX ORDER — LEVETIRACETAM 500 MG/1
500 TABLET ORAL 2 TIMES DAILY
Status: DISCONTINUED | OUTPATIENT
Start: 2020-12-26 | End: 2020-12-26 | Stop reason: HOSPADM

## 2020-12-26 RX ORDER — DEXTROSE MONOHYDRATE 25 G/50ML
25-50 INJECTION, SOLUTION INTRAVENOUS
Status: DISCONTINUED | OUTPATIENT
Start: 2020-12-26 | End: 2020-12-26 | Stop reason: HOSPADM

## 2020-12-26 RX ADMIN — ESCITALOPRAM OXALATE 10 MG: 10 TABLET ORAL at 08:30

## 2020-12-26 RX ADMIN — OXYCODONE HYDROCHLORIDE 10 MG: 5 TABLET ORAL at 06:34

## 2020-12-26 RX ADMIN — LIDOCAINE 1 PATCH: 560 PATCH PERCUTANEOUS; TOPICAL; TRANSDERMAL at 08:30

## 2020-12-26 RX ADMIN — FERROUS SULFATE TAB 325 MG (65 MG ELEMENTAL FE) 325 MG: 325 (65 FE) TAB at 08:24

## 2020-12-26 RX ADMIN — IPRATROPIUM BROMIDE AND ALBUTEROL SULFATE 3 ML: .5; 3 SOLUTION RESPIRATORY (INHALATION) at 08:06

## 2020-12-26 RX ADMIN — DOCUSATE SODIUM AND SENNOSIDES 1 TABLET: 8.6; 5 TABLET ORAL at 08:28

## 2020-12-26 RX ADMIN — FENTANYL CITRATE 12.5 MCG: 50 INJECTION, SOLUTION INTRAMUSCULAR; INTRAVENOUS at 01:51

## 2020-12-26 RX ADMIN — PREGABALIN 150 MG: 75 CAPSULE ORAL at 08:29

## 2020-12-26 RX ADMIN — SOLIFENACIN SUCCINATE 10 MG: 5 TABLET, FILM COATED ORAL at 08:28

## 2020-12-26 RX ADMIN — POLYETHYLENE GLYCOL 3350 17 G: 17 POWDER, FOR SOLUTION ORAL at 08:27

## 2020-12-26 RX ADMIN — IPRATROPIUM BROMIDE AND ALBUTEROL SULFATE 3 ML: .5; 3 SOLUTION RESPIRATORY (INHALATION) at 11:20

## 2020-12-26 RX ADMIN — ASPIRIN 81 MG CHEWABLE TABLET 81 MG: 81 TABLET CHEWABLE at 08:23

## 2020-12-26 RX ADMIN — NICOTINE 1 PATCH: 14 PATCH, EXTENDED RELEASE TRANSDERMAL at 08:27

## 2020-12-26 RX ADMIN — LEVETIRACETAM 500 MG: 500 TABLET ORAL at 08:28

## 2020-12-26 RX ADMIN — PHENYTOIN SODIUM 200 MG: 100 CAPSULE ORAL at 08:29

## 2020-12-26 RX ADMIN — LISINOPRIL 20 MG: 20 TABLET ORAL at 08:30

## 2020-12-26 RX ADMIN — PANTOPRAZOLE SODIUM 40 MG: 40 TABLET, DELAYED RELEASE ORAL at 08:29

## 2020-12-26 RX ADMIN — BUDESONIDE 1 MG: 1 SUSPENSION RESPIRATORY (INHALATION) at 08:06

## 2020-12-26 RX ADMIN — LORATADINE 10 MG: 10 TABLET ORAL at 08:30

## 2020-12-26 RX ADMIN — METOPROLOL SUCCINATE 50 MG: 50 TABLET, EXTENDED RELEASE ORAL at 08:30

## 2020-12-26 ASSESSMENT — ENCOUNTER SYMPTOMS
DIARRHEA: 0
PALPITATIONS: 0
CHILLS: 0
CONSTIPATION: 0
SHORTNESS OF BREATH: 0
TROUBLE SWALLOWING: 0
FREQUENCY: 0
NECK STIFFNESS: 0
FEVER: 0
COLOR CHANGE: 0
HEMATURIA: 0
WEAKNESS: 0
SORE THROAT: 0
POLYDIPSIA: 0
BLOOD IN STOOL: 0
COUGH: 0
NAUSEA: 0
NECK PAIN: 1
NUMBNESS: 0
EYE PAIN: 0
DYSURIA: 0
LIGHT-HEADEDNESS: 0
MYALGIAS: 1
BACK PAIN: 1
ABDOMINAL PAIN: 0
HEADACHES: 1
DIZZINESS: 0
VOMITING: 0

## 2020-12-26 NOTE — CONSULTS
[facility]     History and Physical / Consult note: Trauma Service       Date of Admission:  12/25/2020    Time of Admission/Consult Request (page/call): 12/26/2020  2:34 AM    Time of my evaluation: 12/26/2020     Consulting services:  Neurosurgery - Emergent consult (within 30 mins): Called by ED    Assessment & Plan      Trauma mechanism: Fall from bed   Time/date of injury: 12/25/2020 around 9:00 PM      Known Injuries:  1. None.  Posterior neck soreness.    Other diagnoses:  1.  Diabetes mellitus type 2  2.  Hypertension  3.  Tobacco abuse disorder  4.  Coronary artery disease status post CABG  5.  Chronic pain    Plan:  1.  Neurosurgery Consult: C-spine collar in place.  Pending recommendations.  2.  Tertiary exam later today by trauma team  3.  Per Dr. Dick, patient is being admitted to ED observation unit for evaluation/further work-up of dizziness.    Patient was discussed with Navneet Vaughn MD Trauma Attending    Tierney Mack MD  Trauma Surgery [place]  [id number]    Code status: DNR/DNI, confirmed with patient    ETOH: This patient was asked if in the last 3-6 months there has been a time when she had 4 or more drinks in a single day/outing.. Patient answer to the screening question was in the negative. No intervention needed.  Primary Care Physician   Allan Casey    Chief Complaint   Fall from bed    History is obtained from the patient, electronic health record and emergency department physician    History of Present Illness   Sonya Foote is a 70 year old female (male to female 1975) seen in consultation for Emily Dick MD from the [facility] Emergency department for trauma consult.  Patient has past medical history/surgical history significant for type 2 diabetes mellitus, hypertension, coronary artery disease status post CABG, COPD, seizure disorder, tobacco abuse disorder, neurogenic bladder, bilateral lower extremity  amputations, chronic pain syndrome and uses fentanyl intrathecal pain pump, chronic pain syndromes, etc.  Patient refers that she was at home laying on her bed, felt a little bit dizzy and fell to the ground.  Not sure about loss of consciousness.  Denies hitting her head.  EMS was called and she was brought to the emergency department.  At her arrival, she reported posterior neck pain and a c-collar was placed.  Work-up was done including negative CT head, negative CT cervical spine, negative CT thoracic spine, negative x-ray left shoulder, negative pelvis and hip, negative chest x-ray.  C-spine was not able to be cleared by ER physician.  Then, she has called neurosurgery for evaluation.  Dr. Dick consulted trauma for further evaluation after the fall.  During my evaluation, Sonya denies any pains at this time.  She refers that her neck is sore due to C-spine collar.  Denies any other acute symptoms.    Past Medical History    I have reviewed this patient's medical history and updated it with pertinent information if needed.   Past Medical History:   Diagnosis Date     Acid reflux disease      Amputation above knee (H)     bilateral     Benign essential hypertension      Blind left eye     due to central retinal artery occlusion     CAD (coronary artery disease)     UA and inferior MI in 2016 s/p PCI     Chronic back pain      Chronic neck pain      Chronic pain syndrome     with fentanyl intrathecal pain pump     Complex sleep apnea syndrome      COPD (chronic obstructive pulmonary disease) (H)      Dysphagia     chronic due to esophageal strictures and multiple previous dilitations      ROGER (generalized anxiety disorder)      History of blood transfusion     x5; no adverse reactions     History of central retinal artery occlusion 2006    left-sided     History of stroke     residual left-sided weakness     Hx of carotid artery stenosis     s/p left carotid stent in 2006 and balloon angioplasty in 2016     MDD  (major depressive disorder)      Neurogenic bladder     SPT in place     JOSE (obstructive sleep apnea)      Osteoporosis      PAD (peripheral artery disease) (H)      Peripheral neuropathy      Person who has had sex change operation     male to female     Seizure disorder (H)     many years since last grand mal; daily, brief petit mals     Sickle cell trait (H)      Type 2 diabetes mellitus (H)        Past Surgical History   I have reviewed this patient's surgical history and updated it with pertinent information if needed.  Past Surgical History:   Procedure Laterality Date     AMPUTATE LEG ABOVE KNEE Left 6/11/2016    Procedure: AMPUTATE LEG ABOVE KNEE;  Surgeon: Mello Rodriguez MD;  [facility]     AMPUTATE LEG BELOW KNEE Right 11/7/2016    Procedure: AMPUTATE LEG BELOW KNEE;  Surgeon: Savannah Durant MD;  [facility]     AMPUTATE REVISION STUMP LOWER EXTREMITY Right 11/11/2016    Procedure: AMPUTATE REVISION STUMP LOWER EXTREMITY;  Surgeon: Savannah Durant MD;  [facility]     AMPUTATE REVISION STUMP LOWER EXTREMITY Right 11/16/2016    Procedure: AMPUTATE REVISION STUMP LOWER EXTREMITY;  Surgeon: Savannah Durant MD;  [facility]     AMPUTATE TOE(S) Right 1/5/2016    Procedure: AMPUTATE TOE(S);  Surgeon: Mello Gaines DPM;  Location:  [facility]     ANGIOGRAM Bilateral 11/21/2014    Procedure: ANGIOGRAM;  Surgeon: Savannah Durant MD;  [facility]     ANGIOGRAM Left 1/16/2015    Procedure: ANGIOGRAM;  Surgeon: Savannah Durant MD;  [facility]     ANGIOGRAM Bilateral 9/14/2015    Procedure: ANGIOGRAM;  Surgeon: Savannah Durant MD;  [facility]     ANGIOGRAM Left 10/12/2015    Procedure: ANGIOGRAM;  Surgeon: Savannah Durant MD;  [facility]     ANGIOGRAM Right 6/6/2016    Procedure: ANGIOGRAM;  Surgeon: Savannah Durant MD;  [facility]     ANGIOPLASTY Right 6/6/2016    Procedure: ANGIOPLASTY;  Surgeon: Savannah Durant MD;  [facility]     APPENDECTOMY       BREAST BIOPSY, RT/LT Right      benign     CATARACT IOL, RT/LT Right      CHOLECYSTECTOMY       COLONOSCOPY N/A 8/25/2014    Procedure: COLONOSCOPY;  Surgeon: Mello Ferrer MD;  [facility]     COLONOSCOPY WITH CO2 INSUFFLATION N/A 8/20/2014    Procedure: COLONOSCOPY WITH CO2 INSUFFLATION;  Surgeon: Duane, William Charles, MD;  Location: [facility]     CYSTOSCOPY, INJECT BOTOX N/A 5/21/2020    Procedure: CYSTOSCOPY, WITH BOTULINUM TOXIN INJECTION;  Surgeon: Loki Gordon MD;  [facility]     CYSTOSCOPY, INJECT BOTOX N/A 10/8/2020    Procedure: CYSTOSCOPY, INJECT BOTOX INTO BLADDER, SUPRAPUBIC TUBE EXCHNAGE;  Surgeon: Loki Gordon MD;  [facility]     CYSTOSCOPY, INTRAVESICAL INJECTION N/A 10/31/2019    Procedure: CYSTOSCOPY, BOTOX INJECTION, Suprapubic Catheter Exchange;  Surgeon: Loki Gordon MD;  [facility]     CYSTOSTOMY, INSERT TUBE SUPRAPUBIC, COMBINED N/A 1/16/2018    Procedure: COMBINED CYSTOSTOMY, INSERT TUBE SUPRAPUBIC;  Cystoscopy, Intraoperative Ultrasound, Suprapubic Tube Placement;  Surgeon: Keanu aDwson MD;  [facility]     ENDARTERECTOMY FEMORAL  5/23/2014    Procedure: ENDARTERECTOMY FEMORAL;  Surgeon: Jason Joshi MD;  [facility]     ESOPHAGOSCOPY, GASTROSCOPY, DUODENOSCOPY (EGD), COMBINED  12/14/2012    Procedure: COMBINED ESOPHAGOSCOPY, GASTROSCOPY, DUODENOSCOPY (EGD), BIOPSY SINGLE OR MULTIPLE;  ESOPHAGOSCOPY, GASTROSCOPY, DUODENOSCOPY (EGD), DILATATION ;  Surgeon: Elizabeth Stevenson MD;  Location:  GI     ESOPHAGOSCOPY, GASTROSCOPY, DUODENOSCOPY (EGD), COMBINED  12/31/2013    Procedure: COMBINED ESOPHAGOSCOPY, GASTROSCOPY, DUODENOSCOPY (EGD), BIOPSY SINGLE OR MULTIPLE;;  Surgeon: Clemente Lopez MD;  Location:  GI     ESOPHAGOSCOPY, GASTROSCOPY, DUODENOSCOPY (EGD), COMBINED  4/1/2014    Procedure: COMBINED ESOPHAGOSCOPY, GASTROSCOPY, DUODENOSCOPY (EGD);;  Surgeon: Clemente Lopez MD;  [facility]     ESOPHAGOSCOPY, GASTROSCOPY, DUODENOSCOPY  (EGD), COMBINED  6/28/2014    Procedure: COMBINED ESOPHAGOSCOPY, GASTROSCOPY, DUODENOSCOPY (EGD);  Surgeon: Clemente Lopez MD;  [facility]     ESOPHAGOSCOPY, GASTROSCOPY, DUODENOSCOPY (EGD), COMBINED N/A 8/20/2014    Procedure: COMBINED ESOPHAGOSCOPY, GASTROSCOPY, DUODENOSCOPY (EGD), BIOPSY SINGLE OR MULTIPLE;  Surgeon: Duane, William Charles, MD;  Location:  OR     ESOPHAGOSCOPY, GASTROSCOPY, DUODENOSCOPY (EGD), COMBINED N/A 8/22/2014    Procedure: COMBINED ESOPHAGOSCOPY, GASTROSCOPY, DUODENOSCOPY (EGD), BIOPSY SINGLE OR MULTIPLE;  Surgeon: Mello Ferrer MD;  [facility]     ESOPHAGOSCOPY, GASTROSCOPY, DUODENOSCOPY (EGD), COMBINED N/A 10/2/2014    Procedure: COMBINED ESOPHAGOSCOPY, GASTROSCOPY, DUODENOSCOPY (EGD), BIOPSY SINGLE OR MULTIPLE;  Surgeon: Remy Haskins MD;  [facility]     ESOPHAGOSCOPY, GASTROSCOPY, DUODENOSCOPY (EGD), COMBINED Left 12/15/2014    Procedure: COMBINED ESOPHAGOSCOPY, GASTROSCOPY, DUODENOSCOPY (EGD), BIOPSY SINGLE OR MULTIPLE;  Surgeon: Remy Haskins MD;  [facility]     ESOPHAGOSCOPY, GASTROSCOPY, DUODENOSCOPY (EGD), COMBINED N/A 2/25/2015    Procedure: COMBINED ENDOSCOPIC ULTRASOUND, ESOPHAGOSCOPY, GASTROSCOPY, DUODENOSCOPY (EGD), FINE NEEDLE ASPIRATE/BIOPSY;  Surgeon: Clemente Lugo MD;  [facility]     ESOPHAGOSCOPY, GASTROSCOPY, DUODENOSCOPY (EGD), COMBINED Left 2/25/2015    Procedure: COMBINED ESOPHAGOSCOPY, GASTROSCOPY, DUODENOSCOPY (EGD), BIOPSY SINGLE OR MULTIPLE;  Surgeon: Clemente Lugo MD;  [facility]     ESOPHAGOSCOPY, GASTROSCOPY, DUODENOSCOPY (EGD), COMBINED N/A 9/25/2016    Procedure: COMBINED ESOPHAGOSCOPY, GASTROSCOPY, DUODENOSCOPY (EGD);  Surgeon: Aziza Patiño MD;  [facility]     ESOPHAGOSCOPY, GASTROSCOPY, DUODENOSCOPY (EGD), COMBINED N/A 1/18/2017    Procedure: COMBINED ESOPHAGOSCOPY, GASTROSCOPY, DUODENOSCOPY (EGD), BIOPSY SINGLE OR MULTIPLE;  Surgeon: Clemente Lopez MD;  Location:  [facility]      ESOPHAGOSCOPY, GASTROSCOPY, DUODENOSCOPY (EGD), COMBINED N/A 11/26/2017    Procedure: COMBINED ESOPHAGOSCOPY, GASTROSCOPY, DUODENOSCOPY (EGD), REMOVE FOREIGN BODY;  Esophagogastroduodenoscopy with foreign body extraction  ;  Surgeon: Herberth Castrejon MD;  [facility]     ESOPHAGOSCOPY, GASTROSCOPY, DUODENOSCOPY (EGD), COMBINED N/A 11/26/2017    Procedure: COMBINED ESOPHAGOSCOPY, GASTROSCOPY, DUODENOSCOPY (EGD), REMOVE FOREIGN BODY;;  Surgeon: Herberth Castrejon MD;  [facility]     ESOPHAGOSCOPY, GASTROSCOPY, DUODENOSCOPY (EGD), COMBINED N/A 4/10/2020    Procedure: ESOPHAGOGASTRODUODENOSCOPY (EGD);  Surgeon: Yael Cabral MD;  [facility]     FASCIOTOMY LOWER EXTREMITY Left 6/10/2016    Procedure: FASCIOTOMY LOWER EXTREMITY;  Surgeon: Mello Rodriguez MD;  [facility]     HC CAPSULE ENDOSCOPY N/A 8/25/2014    Procedure: CAPSULE/PILL CAM ENDOSCOPY;  Surgeon: Remy Haskins MD;  [facility]     HC CAPSULE ENDOSCOPY N/A 10/2/2014    Procedure: CAPSULE/PILL CAM ENDOSCOPY;  Surgeon: Remy Haskins MD;  [facility]     INJECT BLOCK MEDIAL BRANCH CERVICAL/THORACIC/LUMBAR Left 10/30/2020    Procedure: Cervical 3, 4, and 5 Medial Branch Block;  Surgeon: Evelyn Lima MD;  Location: [facility] OR     ORTHOPEDIC SURGERY      broken wrist repair     REVISE CATHETER INTRATHECAL  08/24/2020     SEX TRANSFORMATION SURGERY, MALE TO FEMALE  1974 1974     SINUS SURGERY      cyst removed     TONSILLECTOMY       VASCULAR SURGERY  2006    Left carotid stent     Prior to Admission Medications   Prior to Admission Medications   Prescriptions Last Dose Informant Patient Reported? Taking?   ACETAMINOPHEN EXTRA STRENGTH 500 MG tablet   No No   Sig: TAKE 1 TABLET BY MOUTH EVERY 6 HOURS AS NEEDED FOR PAIN / FEVER   ADVAIR DISKUS 250-50 MCG/DOSE inhaler   No No   Sig: INHALE 1 PUFF BY MOUTH TWICE DAILY   ASPERCREME LIDOCAINE 4 % Patch   No No   Sig: APPLY 1 PATCH TOPICALLY TO LOWER BACK ONCE  DAILY (ON FOR 12 HOURS, OFF FOR 12 HOURS)   ASPIRIN LOW DOSE 81 MG chewable tablet   No No   Sig: CHEW AND SWALLOW ONE TABLET BY MOUTH IN THE MORNING   EPINEPHrine (ANY BX GENERIC EQUIV) 0.3 MG/0.3ML injection 2-pack   No No   Sig: INJECT 0.3 ML INTO THE MUSCLE AS NEEDED FOR ANAPHYLAXIS   FEROSUL 325 (65 Fe) MG tablet   No No   Sig: TAKE 1 TABLET BY MOUTH TWICE DAILY WITH MEALS   INCRUSE ELLIPTA 62.5 MCG/INH Inhaler   No No   Sig: INHALE 1 PUFF BY MOUTH ONCE DAILY   Menthol, Topical Analgesic, 5 % GEL   No No   Sig: Externally apply topically daily as needed   Multiple Vitamins-Minerals (TAB-A-MACY) TABS   No No   Sig: TAKE 1 TABLET BY MOUTH ONCE DAILY   Nutritional Supplements (ENSURE) LIQD   No No   Sig: DRINK ONE CAN / BOTTLE BY MOUTH TWICE DAILY WITH MEALS   SENEXON-S 8.6-50 MG tablet   No No   Sig: TAKE 1 TABLET BY MOUTH TWICE DAILY   Vitamin D3 (VITAMIN D) 10 MCG (400 UNIT) tablet   No No   Sig: TAKE TWO TABLETS (800 UNITS) BY MOUTH ONCE DAILY   albuterol (PROAIR HFA/PROVENTIL HFA/VENTOLIN HFA) 108 (90 Base) MCG/ACT inhaler   No No   Sig: Inhale 2 puffs into the lungs every 6 hours   albuterol (PROVENTIL) (2.5 MG/3ML) 0.083% neb solution   No No   Sig: Take 1 vial (2.5 mg) by nebulization every 6 hours as needed for shortness of breath / dyspnea or wheezing   alendronate (FOSAMAX) 70 MG tablet   No No   Sig: TAKE ONE TABLET BY MOUTH EVERY 7 DAYS (TAKE WITH 8OZ OF WATER 30 MINUTES BEFORE BREAKFAST AND REMAIN UPRIGHT DURING THIS TIME)   atorvastatin (LIPITOR) 40 MG tablet   No No   Sig: TAKE 1 TABLET BY MOUTH AT BEDTIME   blood glucose monitoring (ONE TOUCH ULTRA 2) meter device kit  Nursing Home No No   Sig: Use to test blood sugars 3 times daily or as directed.   blood glucose monitoring (ONE TOUCH ULTRA) test strip  Nursing Home No No   Sig: Use to test blood sugars 3 times daily or as directed.   blood glucose monitoring (ONE TOUCH ULTRASOFT) lancets  Nursing Home No No   Sig: Use to test blood sugar 3 times  daily or as directed.   brimonidine (ALPHAGAN) 0.2 % ophthalmic solution   No No   Sig: Place 1 drop into the right eye 2 times daily ( 12 hours apart).    Please keep 11-16-20 appt for refills.   capsaicin-menthol-methyl sal 0.025-1-12 % external cream   No No   Sig: Apply 1 Application topically daily as needed (topical pain)   diclofenac (VOLTAREN) 1 % topical gel   No No   Sig: APPLY 2 GRAMS TOPICALLY TO AFFECTED AREA(S) FOUR TIMES A DAY   dorzolamide (TRUSOPT) 2 % ophthalmic solution   No No   Sig: Place 1 drop into the right eye 2 times daily   dorzolamide (TRUSOPT) 2 % ophthalmic solution   No No   Sig: Place 1 drop into the right eye 2 times daily   escitalopram (LEXAPRO) 10 MG tablet   No No   Sig: TAKE 1 TABLET BY MOUTH ONCE DAILY   fluticasone (FLONASE) 50 MCG/ACT nasal spray   No No   Sig: SPRAY 2 SPRAYS IN EACH NOSTRIL DAILY   lactulose (CHRONULAC) 10 GM/15ML solution   No No   Sig: TAKE 30ML (20MG) BY MOUTH AS NEEDED FOR CONSTIPATION   latanoprost (XALATAN) 0.005 % ophthalmic solution   No No   Sig: INSTILL ONE DROP IN EACH EYE AT BEDTIME   levETIRAcetam (KEPPRA) 500 MG tablet   No No   Sig: TAKE 1 TABLET BY MOUTH TWICE DAILY   lisinopril (ZESTRIL) 20 MG tablet   No No   Sig: TAKE 1 TABLET BY MOUTH EVERY MORNING   loratadine (CLARITIN) 10 MG tablet   No No   Sig: TAKE 1 TABLET BY MOUTH ONCE DAILY   metFORMIN (GLUCOPHAGE) 500 MG tablet   No No   Sig: TAKE 1 TABLET BY MOUTH EVERY EVENING WITH DINNER   metoprolol succinate ER (TOPROL-XL) 50 MG 24 hr tablet   No No   Sig: TAKE 1 TABLET BY MOUTH EVERY MORNING   multivitamin, therapeutic (THERA-VIT) TABS tablet   No No   Sig: Take 1 tablet by mouth daily   nicotine (COMMIT) 2 MG lozenge   No No   Sig: Place 1 lozenge (2 mg) inside cheek every hour as needed for smoking cessation   Patient not taking: Reported on 12/14/2020   nicotine (NICODERM CQ) 14 MG/24HR 24 hr patch   No No   Sig: APPLY 1 PATCH TOPICALLY DAILY ( EVERY 24 HOURS )   nitroGLYcerin  (NITROSTAT) 0.4 MG sublingual tablet   No No   Sig: DISSOLVE ONE TABLET UNDER TONGUE AS NEEDED FOR CHEST PAIN MAY REPEAT EVERY 5 MINUTES FOR 3 DOSES, IF SYMPTOMS PERSIST CALL 911   pantoprazole (PROTONIX) 40 MG EC tablet   No No   Sig: TAKE 1 TABLET BY MOUTH EVERY MORNING   phenytoin (DILANTIN) 100 MG capsule   No No   Sig: TAKE 2 CAPS (200MG) BY MOUTH TWICE A DAY   phenytoin sodium extended (DILANTIN) 200 MG capsule   No No   Sig: TAKE 1 CAPSULE BY MOUTH TWICE DAILY   polyethylene glycol (MIRALAX) 17 GM/SCOOP powder   No No   Sig: MIX 17GM OF POWDER IN 8OZ OF WATER UNTIL COMPLETELY DISSOLVED. DRINK SOLUTION BY MOUTH IN THE MORNING   pregabalin (LYRICA) 150 MG capsule   No No   Sig: TAKE 1 CAPSULE BY MOUTH THREE TIMES DAILY   risperiDONE (RISPERDAL) 0.5 MG tablet   No No   Sig: TAKE 1 TABLET BY MOUTH AT BEDTIME   sennosides (SENOKOT) 8.6 MG tablet   Yes No   Sig: Take 1 tablet by mouth 2 times daily    solifenacin (VESICARE) 10 MG tablet   No No   Sig: TAKE 1 TABLET BY MOUTH EVERY MORNING   traZODone (DESYREL) 150 MG tablet   No No   Sig: TAKE 1 TABLET BY MOUTH AT BEDTIME   vitamin D3 (CHOLECALCIFEROL) 10 MCG (400 UNIT) capsule   Yes No   Sig: Take 2 capsules by mouth daily      Facility-Administered Medications: None     Allergies   Allergies   Allergen Reactions     Bee Venom Anaphylaxis     Penicillins Anaphylaxis     Dilantin [Phenytoin] Other (See Comments)     Generic dilantin only per pt     Iodine Hives     Novocaine [Procaine] Hives     Tositumomab Unknown       Social History   Social History     Socioeconomic History     Marital status:      Spouse name: Not on file     Number of children: 2     Years of education: Not on file     Highest education level: Not on file   Occupational History     Occupation: Retired   Social Needs     Financial resource strain: Not on file     Food insecurity     Worry: Not on file     Inability: Not on file     Transportation needs     Medical: Not on file      Non-medical: Not on file   Tobacco Use     Smoking status: Current Every Day Smoker     Packs/day: 1.00     Years: 30.00     Pack years: 30.00     Types: Cigarettes, Cigars     Smokeless tobacco: Never Used     Tobacco comment: 1.5 packs per day    Substance and Sexual Activity     Alcohol use: No     Alcohol/week: 0.0 standard drinks     Drug use: No     Sexual activity: Not Currently   Lifestyle     Physical activity     Days per week: Not on file     Minutes per session: Not on file     Stress: Not on file   Relationships     Social connections     Talks on phone: Not on file     Gets together: Not on file     Attends Denominational service: Not on file     Active member of club or organization: Not on file     Attends meetings of clubs or organizations: Not on file     Relationship status: Not on file     Intimate partner violence     Fear of current or ex partner: Not on file     Emotionally abused: Not on file     Physically abused: Not on file     Forced sexual activity: Not on file   Other Topics Concern     Parent/sibling w/ CABG, MI or angioplasty before 65F 55M? Not Asked      Service Not Asked     Blood Transfusions Not Asked     Caffeine Concern No     Comment: 1 in the morning     Occupational Exposure Not Asked     Hobby Hazards Not Asked     Sleep Concern Not Asked     Stress Concern Not Asked     Weight Concern Not Asked     Special Diet Not Asked     Back Care Not Asked     Exercise Not Asked     Bike Helmet Not Asked     Seat Belt Not Asked     Self-Exams Not Asked   Social History Narrative    Living in a nursing home (as of 2020) for prosthetic fitting and therapy; usually resides in an [facility].    Two adopted children (Kam and Prashanth) and 3 grandchildren (as of 2020).       Family History   I have reviewed this patient's family history and updated it with pertinent information if needed.   Family History   Problem Relation Age of Onset     Dementia Mother      Diabetes Mother         type  unknown     Coronary Artery Disease Mother         MI     Glaucoma Father      Diabetes Father         type unknown     Heart Failure Father      Schizophrenia Brother      Depression Brother      Suicide Sister      Depression Sister      Diabetes Sister      Ovarian Cancer Maternal Aunt      Leukemia Maternal Aunt      Diabetes Maternal Grandmother      Diabetes Maternal Grandfather      Diabetes Paternal Grandmother      Diabetes Paternal Grandfather      Breast Cancer Sister      Cerebrovascular Disease Brother      Colon Cancer No family hx of      Macular Degeneration No family hx of        Review of Systems   CONSTITUTIONAL: No fever, chills, sweats, fatigue   EYES: Left eye blindness, decreased visual field in right eye per patient  ENT: no decrease in hearing, no tinnitus, no vertigo, no hoarseness  RESPIRATORY: no shortness of breath, no cough, no sputum   CARDIOVASCULAR: no palpitations, no chest  pain, no exertional chest pain or pressure  GASTROINTESTINAL: no nausea or vomiting, or abd pain  GENITOURINARY: no dysuria, no frequency or hesitancy, no hematuria  MUSCULOSKELETAL: no weakness, no redness, no swelling, no joint pain,   SKIN: no rashes, ecchymoses, abrasions or lacerations  NEUROLOGIC: no numbness or tingling of hands, no numbness or tingling  of feet, no syncope, no tremors or weakness  PSYCHIATRIC: no sleep disturbances, no anxiety or depression    Physical Exam   Temp: 97.7  F (36.5  C) Temp src: Oral BP: 117/76 Pulse: 71   Resp: 16 SpO2: 100 %      Vital Signs with Ranges  Temp:  [97.7  F (36.5  C)] 97.7  F (36.5  C)  Pulse:  [71-76] 71  Resp:  [16] 16  BP: (109-117)/(64-76) 117/76  SpO2:  [94 %-100 %] 100 % 0 lbs 0 oz    Primary Survey:  Airway: patient talking  Breathing: symmetric respiratory effort bilaterally  Circulation: central pulses present and peripheral pulses present  Disability: Pupils - left 3 mm and brisk, right 3 mm and brisk   [place] Coma Scale - Total 15/15  Eye Response  (E): 4  4= spontaneous,  3= to verbal/voice, 2=  to pain, 1= No response   Verbal Response (V): 5   5= Orientated, converses,  4= Confused, converses, 3= Inappropriate words,  2= Incomprehensible sounds,  1=No response   Motor Response (M): 6   6= Obeys commands, 5= Localizes to pain, 4= Withdrawal to pain, 3=Fexion to pain, 2= Extension to pain, 1= No response    Secondary Survey:  General: alert, oriented to person, place, time  Neuro: PERRLA. EOMI. CN II-XII grossly intact. No focal deficits. Strength 5/5 x 4 extremities.  Sensation intact.  Head: atraumatic, normocephalic, trachea midline  Eyes:  Pupils 3 mm, EOMI, corneas and conjunctivae clear  Ears: pearly grey bilateral TMs and non-inflamed external ear canals  Nose: nares patent, no drainage, nasal septum non-tender  Mouth/Throat: no exudates or erythema,  no dental tenderness or malocclusions, no tongue lacerations  Neck: cervical collar present. No midline posterior tenderness, full AROM without pain.   Chest/Pulmonary: normal respiratory rate and rhythm,  bilateral clear breath sounds, no wheezes, rales or rhonchi, no chest wall tenderness or deformities,   Cardiovascular: S1, S2,  normal and regular rate and rhythm, no murmurs  Abdomen: soft, non-tender, no guarding, no rebound tenderness and no tenderness to palpation  : pelvis stable to lateral compression, suprapubic catheter in place, urine yellow and clear  Musculoskel/Extremities: Bilateral amputations above the knee, full AROM of major joints without tenderness, edema, erythema, ecchymosis, or abrasions. PP. No edema.   Back/Spine: no deformity, no midline tenderness, no sacral tenderness, no step-offs and no abrasions or contusions  Hands: no gross deformities of hands or fingers. Full AROM of hand and fingers in flexion and extension.  strength equal and symmetric.   Psychiatric: affect/mood normal, cooperative, normal judgement/insight and memory intact  Skin: no rashes, laceration,  ecchymosis, skin warm and dry.     Results for orders placed or performed during the hospital encounter of 12/25/20 (from the past 24 hour(s))   EKG 12-lead, tracing only   Result Value Ref Range    Interpretation ECG Click View Image link to view waveform and result    CBC with platelets differential   Result Value Ref Range    WBC 9.5 4.0 - 11.0 10e9/L    RBC Count 4.00 3.8 - 5.2 10e12/L    Hemoglobin 12.0 11.7 - 15.7 g/dL    Hematocrit 35.9 35.0 - 47.0 %    MCV 90 78 - 100 fl    MCH 30.0 26.5 - 33.0 pg    MCHC 33.4 31.5 - 36.5 g/dL    RDW 13.2 10.0 - 15.0 %    Platelet Count 214 150 - 450 10e9/L    Diff Method Automated Method     % Neutrophils 63.5 %    % Lymphocytes 19.4 %    % Monocytes 11.7 %    % Eosinophils 4.7 %    % Basophils 0.4 %    % Immature Granulocytes 0.3 %    Nucleated RBCs 0 0 /100    Absolute Neutrophil 6.0 1.6 - 8.3 10e9/L    Absolute Lymphocytes 1.9 0.8 - 5.3 10e9/L    Absolute Monocytes 1.1 0.0 - 1.3 10e9/L    Absolute Eosinophils 0.5 0.0 - 0.7 10e9/L    Absolute Basophils 0.0 0.0 - 0.2 10e9/L    Abs Immature Granulocytes 0.0 0 - 0.4 10e9/L    Absolute Nucleated RBC 0.0    INR   Result Value Ref Range    INR 1.09 0.86 - 1.14   Comprehensive metabolic panel   Result Value Ref Range    Sodium 139 133 - 144 mmol/L    Potassium 3.9 3.4 - 5.3 mmol/L    Chloride 107 94 - 109 mmol/L    Carbon Dioxide 29 20 - 32 mmol/L    Anion Gap 3 3 - 14 mmol/L    Glucose 96 70 - 99 mg/dL    Urea Nitrogen 11 7 - 30 mg/dL    Creatinine 0.57 0.52 - 1.04 mg/dL    GFR Estimate >90 >60 mL/min/[1.73_m2]    GFR Estimate If Black >90 >60 mL/min/[1.73_m2]    Calcium 8.6 8.5 - 10.1 mg/dL    Bilirubin Total 0.2 0.2 - 1.3 mg/dL    Albumin 2.9 (L) 3.4 - 5.0 g/dL    Protein Total 7.3 6.8 - 8.8 g/dL    Alkaline Phosphatase 254 (H) 40 - 150 U/L    ALT 78 (H) 0 - 50 U/L    AST 55 (H) 0 - 45 U/L   Lactic acid whole blood   Result Value Ref Range    Lactic Acid 1.0 0.7 - 2.0 mmol/L   Troponin I   Result Value Ref Range    Troponin I  ES <0.015 0.000 - 0.045 ug/L   Head CT w/o contrast    Narrative    EXAM: CT HEAD W/O CONTRAST  LOCATION: [facility]  DATE/TIME: 12/26/2020 12:02 AM    INDICATION: Traumatic injury. Dizziness.  COMPARISON: 12/05/2017 head CT  TECHNIQUE: Routine CT Head without IV contrast. Multiplanar reformats. Dose reduction techniques were used.    FINDINGS:  INTRACRANIAL CONTENTS: No intracranial hemorrhage, extraaxial collection, or mass effect.  No CT evidence of acute infarct. Mild presumed chronic small vessel ischemic changes. Chronic lacunar infarct in the body of the right caudate nucleus and adjacent   white matter. Mild generalized volume loss. No hydrocephalus. Dense atherosclerotic calcification of the internal carotid arteries and P4 segments of the vertebral arteries.    VISUALIZED ORBITS/SINUSES/MASTOIDS: Prior right cataract surgery. Visualized portions of the orbits are otherwise unremarkable. No paranasal sinus mucosal disease. No middle ear or mastoid effusion.    BONES/SOFT TISSUES: No acute abnormality.      Impression    IMPRESSION:  1.  No CT evidence for acute intracranial process.  2.  Brain atrophy and presumed chronic microvascular ischemic changes as above.   Cervical spine CT w/o contrast    Narrative    EXAM: CT CERVICAL SPINE W/O CONTRAST  LOCATION: [facility]  DATE/TIME: 12/26/2020 12:03 AM    INDICATION: Traumatic injury. Neck pain.  COMPARISON: Cervical spine MRI dated 07/27/2020  TECHNIQUE: Routine CT Cervical Spine without IV contrast. Multiplanar reformats. Dose reduction techniques were used.    FINDINGS:  VERTEBRA: Normal vertebral body heights and alignment. No fracture or posttraumatic subluxation.     CANAL/FORAMINA: Degenerative changes of the cervical spine, with at least mild canal stenosis at C5-C6. Bilateral neural foraminal narrowing is better evaluated on the prior MRI. Degenerative changes of the dens with narrowing of the atlantodental    interval.    PARASPINAL: Left carotid stent. Dense atherosclerotic plaque in the right common and internal carotid artery.      Impression    IMPRESSION:  1.  No evidence of an acute displaced fracture of the cervical spine.  2.  Degenerative changes, without high-grade osseous spinal canal stenosis.   CT Thoracic Spine w/o Contrast    Narrative    EXAM: CT THORACIC SPINE W/O CONTRAST  LOCATION: [facility]  DATE/TIME: 12/26/2020 12:02 AM    INDICATION: Traumatic injury. Fall. Back pain.  COMPARISON: Chest CT dated 06/10/2020  TECHNIQUE: Routine CT Thoracic Spine without IV contrast. Multiplanar reformats. Dose reduction techniques were used.     FINDINGS:  VERTEBRA: Stable accentuation of the thoracic spine kyphosis, with right apex curvature of the mid thoracic spine. Vertebral body heights are stable in height. No evidence of an acute fracture. There is a spine stimulator in place, with leads terminating   at the level of the mid T9 vertebral body and at T10-T11.     CANAL/FORAMINA: Multilevel degenerative endplate changes with marginal osteophytes. No high-grade osseous neural foraminal narrowing.    PARASPINAL: No acute extraspinal abnormality. Dependent atelectasis. Scattered atherosclerotic calcification. Coronary artery disease.      Impression    IMPRESSION:  1.  No evidence of an acute osseous abnormality of the thoracic spine.     XR Shoulder Left Port G/E 2 Views    Narrative    EXAM: LEFT SHOULDER 3 VIEWS  LOCATION: [facility]  DATE/TIME: 12/26/2020 12:08 AM    INDICATION: Fall. Left shoulder pain.  COMPARISON: None.      Impression    IMPRESSION:   1. No visualized acute fracture or malalignment of the left shoulder.  2. Mild degenerative changes in the left acromioclavicular joint.    XR Pelvis w Hip Port Left G/E 2 Views    Narrative    EXAM: XR PELVIS AND HIP PORTABLE LEFT 2 VIEWS  LOCATION: [facility]  DATE/TIME: 12/26/2020 12:08 AM    INDICATION: Injury  with pain.  COMPARISON: None.      Impression    IMPRESSION: Demineralization. No fracture or dislocation. Iliac stents.   UA with Microscopic   Result Value Ref Range    Color Urine Light Yellow     Appearance Urine Clear     Glucose Urine Negative NEG^Negative mg/dL    Bilirubin Urine Negative NEG^Negative    Ketones Urine Negative NEG^Negative mg/dL    Specific Gravity Urine 1.006 1.003 - 1.035    Blood Urine Trace (A) NEG^Negative    pH Urine 6.5 5.0 - 7.0 pH    Protein Albumin Urine Negative NEG^Negative mg/dL    Urobilinogen mg/dL Normal 0.0 - 2.0 mg/dL    Nitrite Urine Positive (A) NEG^Negative    Leukocyte Esterase Urine Large (A) NEG^Negative    Source Catheterized Urine     WBC Urine 13 (H) 0 - 5 /HPF    RBC Urine 1 0 - 2 /HPF    Bacteria Urine Few (A) NEG^Negative /HPF    Amorphous Crystals Few (A) NEG^Negative /HPF     *Note: Due to a large number of results and/or encounters for the requested time period, some results have not been displayed. A complete set of results can be found in Results Review.       Studies:  XR Pelvis w Hip Port Left G/E 2 Views   Final Result   IMPRESSION: Demineralization. No fracture or dislocation. Iliac stents.      XR Shoulder Left Port G/E 2 Views   Final Result   IMPRESSION:    1. No visualized acute fracture or malalignment of the left shoulder.   2. Mild degenerative changes in the left acromioclavicular joint.       CT Thoracic Spine w/o Contrast   Final Result   IMPRESSION:   1.  No evidence of an acute osseous abnormality of the thoracic spine.         Cervical spine CT w/o contrast   Final Result   IMPRESSION:   1.  No evidence of an acute displaced fracture of the cervical spine.   2.  Degenerative changes, without high-grade osseous spinal canal stenosis.      Head CT w/o contrast   Final Result   IMPRESSION:   1.  No CT evidence for acute intracranial process.   2.  Brain atrophy and presumed chronic microvascular ischemic changes as above.      XR Chest Port 1  View    (Results Pending)       Tierney Mack

## 2020-12-26 NOTE — PLAN OF CARE
"- Diagnostic tests and consults completed and resulted- met  - No further episodes of syncope and any new arrhythmia addressed with controlled heart rates- met  - Vital signs normal or at patient baseline and orthostatic vitals are normal and patient not lightheaded with standing- met, /65 (BP Location: Left arm)   Pulse 69   Temp 98.4  F (36.9  C) (Oral)   Resp 16   Ht 1.092 m (3' 7\")   Wt 56.7 kg (125 lb)   SpO2 99%   BMI 47.53 kg/m      - Tolerating oral intake to maintain hydration- met  - Safe disposition plan has been identified- met    Pt ready for discharge.   "

## 2020-12-26 NOTE — CONSULTS
[facility]       NEUROSURGERY CONSULTATION NOTE    Time Paged/Notified: 02:21  Trauma Activation: No  Arrival time in ED: 5 minutes  GCS:   Motor 6=Obeys commands   Verbal 5=Oriented   Eye Opening 4=Spontaneous   Total: 15         This consultation was requested by Dr. Dick from the ED service.    Reason for Consultation: Neck pain after a fall    HPI: Sonya Foote is a 70 year old female with a complex medical history who presents after a fall. She was trying to get water when she fell and hit her left side, and hitting her head. She states she may have lost consciousness for 2 seconds. She has pain on her left side and neck but she doesn't have any other new symptoms. She denies any nausea, vomiting, changes in vision, numbness, tingling, or any other neurologic symptoms.      PAST MEDICAL HISTORY:   Past Medical History:   Diagnosis Date     Acid reflux disease      Amputation above knee (H)     bilateral     Benign essential hypertension      Blind left eye     due to central retinal artery occlusion     CAD (coronary artery disease)     UA and inferior MI in 2016 s/p PCI     Chronic back pain      Chronic neck pain      Chronic pain syndrome     with fentanyl intrathecal pain pump     Complex sleep apnea syndrome      COPD (chronic obstructive pulmonary disease) (H)      Dysphagia     chronic due to esophageal strictures and multiple previous dilitations      ROGER (generalized anxiety disorder)      History of blood transfusion     x5; no adverse reactions     History of central retinal artery occlusion 2006    left-sided     History of stroke     residual left-sided weakness     Hx of carotid artery stenosis     s/p left carotid stent in 2006 and balloon angioplasty in 2016     MDD (major depressive disorder)      Neurogenic bladder     SPT in place     JOSE (obstructive sleep apnea)      Osteoporosis      PAD (peripheral artery disease) (H)      Peripheral  neuropathy      Person who has had sex change operation     male to female     Seizure disorder (H)     many years since last grand mal; daily, brief petit mals     Sickle cell trait (H)      Type 2 diabetes mellitus (H)        PAST SURGICAL HISTORY:   Past Surgical History:   Procedure Laterality Date     AMPUTATE LEG ABOVE KNEE Left 6/11/2016    Procedure: AMPUTATE LEG ABOVE KNEE;  Surgeon: Mello Rodriguez MD;  [facility]     AMPUTATE LEG BELOW KNEE Right 11/7/2016    Procedure: AMPUTATE LEG BELOW KNEE;  Surgeon: Savannah Durant MD;  [facility]     AMPUTATE REVISION STUMP LOWER EXTREMITY Right 11/11/2016    Procedure: AMPUTATE REVISION STUMP LOWER EXTREMITY;  Surgeon: Savannah Durant MD;  [facility]     AMPUTATE REVISION STUMP LOWER EXTREMITY Right 11/16/2016    Procedure: AMPUTATE REVISION STUMP LOWER EXTREMITY;  Surgeon: Savannah Durant MD;  [facility]     AMPUTATE TOE(S) Right 1/5/2016    Procedure: AMPUTATE TOE(S);  Surgeon: Mello Gaines DPM;  Location: [facility]     ANGIOGRAM Bilateral 11/21/2014    Procedure: ANGIOGRAM;  Surgeon: Savannah Durant MD;  [facility]     ANGIOGRAM Left 1/16/2015    Procedure: ANGIOGRAM;  Surgeon: Savannah Durant MD;  [facility]     ANGIOGRAM Bilateral 9/14/2015    Procedure: ANGIOGRAM;  Surgeon: Savannah Durant MD;  [facility]     ANGIOGRAM Left 10/12/2015    Procedure: ANGIOGRAM;  Surgeon: Savannah Durant MD;  [facility]     ANGIOGRAM Right 6/6/2016    Procedure: ANGIOGRAM;  Surgeon: Savannah Durant MD;  [facility]     ANGIOPLASTY Right 6/6/2016    Procedure: ANGIOPLASTY;  Surgeon: Savannah Durant MD;  [facility]     APPENDECTOMY       BREAST BIOPSY, RT/LT Right     benign     CATARACT IOL, RT/LT Right      CHOLECYSTECTOMY       COLONOSCOPY N/A 8/25/2014    Procedure: COLONOSCOPY;  Surgeon: Mello Ferrer MD;  [facility]     COLONOSCOPY WITH CO2 INSUFFLATION N/A 8/20/2014    Procedure: COLONOSCOPY WITH CO2 INSUFFLATION;  Surgeon:  Duane, William Charles, MD;  Location: [facility]     CYSTOSCOPY, INJECT BOTOX N/A 5/21/2020    Procedure: CYSTOSCOPY, WITH BOTULINUM TOXIN INJECTION;  Surgeon: Loki Gordon MD;  [facility]     CYSTOSCOPY, INJECT BOTOX N/A 10/8/2020    Procedure: CYSTOSCOPY, INJECT BOTOX INTO BLADDER, SUPRAPUBIC TUBE EXCHNAGE;  Surgeon: Loki Gordon MD;  [facility]     CYSTOSCOPY, INTRAVESICAL INJECTION N/A 10/31/2019    Procedure: CYSTOSCOPY, BOTOX INJECTION, Suprapubic Catheter Exchange;  Surgeon: Loki Gordon MD;  [facility]     CYSTOSTOMY, INSERT TUBE SUPRAPUBIC, COMBINED N/A 1/16/2018    Procedure: COMBINED CYSTOSTOMY, INSERT TUBE SUPRAPUBIC;  Cystoscopy, Intraoperative Ultrasound, Suprapubic Tube Placement;  Surgeon: Keanu Dawson MD;  [facility]     ENDARTERECTOMY FEMORAL  5/23/2014    Procedure: ENDARTERECTOMY FEMORAL;  Surgeon: Jason Joshi MD;  [facility]     ESOPHAGOSCOPY, GASTROSCOPY, DUODENOSCOPY (EGD), COMBINED  12/14/2012    Procedure: COMBINED ESOPHAGOSCOPY, GASTROSCOPY, DUODENOSCOPY (EGD), BIOPSY SINGLE OR MULTIPLE;  ESOPHAGOSCOPY, GASTROSCOPY, DUODENOSCOPY (EGD), DILATATION ;  Surgeon: Elizabeth Stevenson MD;  Location:  GI     ESOPHAGOSCOPY, GASTROSCOPY, DUODENOSCOPY (EGD), COMBINED  12/31/2013    Procedure: COMBINED ESOPHAGOSCOPY, GASTROSCOPY, DUODENOSCOPY (EGD), BIOPSY SINGLE OR MULTIPLE;;  Surgeon: Clemente Lopez MD;  [facility]     ESOPHAGOSCOPY, GASTROSCOPY, DUODENOSCOPY (EGD), COMBINED  4/1/2014    Procedure: COMBINED ESOPHAGOSCOPY, GASTROSCOPY, DUODENOSCOPY (EGD);;  Surgeon: Clemente Lopez MD;  [facility]     ESOPHAGOSCOPY, GASTROSCOPY, DUODENOSCOPY (EGD), COMBINED  6/28/2014    Procedure: COMBINED ESOPHAGOSCOPY, GASTROSCOPY, DUODENOSCOPY (EGD);  Surgeon: Clemente Lopez MD;  [facility]     ESOPHAGOSCOPY, GASTROSCOPY, DUODENOSCOPY (EGD), COMBINED N/A 8/20/2014    Procedure: COMBINED ESOPHAGOSCOPY, GASTROSCOPY,  DUODENOSCOPY (EGD), BIOPSY SINGLE OR MULTIPLE;  Surgeon: Duane, William Charles, MD;  [facility]: [facility]     ESOPHAGOSCOPY, GASTROSCOPY, DUODENOSCOPY (EGD), COMBINED N/A 8/22/2014    Procedure: COMBINED ESOPHAGOSCOPY, GASTROSCOPY, DUODENOSCOPY (EGD), BIOPSY SINGLE OR MULTIPLE;  Surgeon: Mello Ferrer MD;  [facility]     ESOPHAGOSCOPY, GASTROSCOPY, DUODENOSCOPY (EGD), COMBINED N/A 10/2/2014    Procedure: COMBINED ESOPHAGOSCOPY, GASTROSCOPY, DUODENOSCOPY (EGD), BIOPSY SINGLE OR MULTIPLE;  Surgeon: Remy Haskins MD;  [facility]     ESOPHAGOSCOPY, GASTROSCOPY, DUODENOSCOPY (EGD), COMBINED Left 12/15/2014    Procedure: COMBINED ESOPHAGOSCOPY, GASTROSCOPY, DUODENOSCOPY (EGD), BIOPSY SINGLE OR MULTIPLE;  Surgeon: Remy Haskins MD;  [facility]     ESOPHAGOSCOPY, GASTROSCOPY, DUODENOSCOPY (EGD), COMBINED N/A 2/25/2015    Procedure: COMBINED ENDOSCOPIC ULTRASOUND, ESOPHAGOSCOPY, GASTROSCOPY, DUODENOSCOPY (EGD), FINE NEEDLE ASPIRATE/BIOPSY;  Surgeon: Clemente Lugo MD;  [facility]     ESOPHAGOSCOPY, GASTROSCOPY, DUODENOSCOPY (EGD), COMBINED Left 2/25/2015    Procedure: COMBINED ESOPHAGOSCOPY, GASTROSCOPY, DUODENOSCOPY (EGD), BIOPSY SINGLE OR MULTIPLE;  Surgeon: Clemente Lugo MD;  [facility]     ESOPHAGOSCOPY, GASTROSCOPY, DUODENOSCOPY (EGD), COMBINED N/A 9/25/2016    Procedure: COMBINED ESOPHAGOSCOPY, GASTROSCOPY, DUODENOSCOPY (EGD);  Surgeon: Aziza Patiño MD;  [facility]     ESOPHAGOSCOPY, GASTROSCOPY, DUODENOSCOPY (EGD), COMBINED N/A 1/18/2017    Procedure: COMBINED ESOPHAGOSCOPY, GASTROSCOPY, DUODENOSCOPY (EGD), BIOPSY SINGLE OR MULTIPLE;  Surgeon: Clemente Lopez MD;  [facility]     ESOPHAGOSCOPY, GASTROSCOPY, DUODENOSCOPY (EGD), COMBINED N/A 11/26/2017    Procedure: COMBINED ESOPHAGOSCOPY, GASTROSCOPY, DUODENOSCOPY (EGD), REMOVE FOREIGN BODY;  Esophagogastroduodenoscopy with foreign body extraction  ;  Surgeon: Herberth Castrejon MD;  [facility]      ESOPHAGOSCOPY, GASTROSCOPY, DUODENOSCOPY (EGD), COMBINED N/A 11/26/2017    Procedure: COMBINED ESOPHAGOSCOPY, GASTROSCOPY, DUODENOSCOPY (EGD), REMOVE FOREIGN BODY;;  Surgeon: Herberth Castrejon MD;  [facility]     ESOPHAGOSCOPY, GASTROSCOPY, DUODENOSCOPY (EGD), COMBINED N/A 4/10/2020    Procedure: ESOPHAGOGASTRODUODENOSCOPY (EGD);  Surgeon: Yael Cabral MD;  [facility]     FASCIOTOMY LOWER EXTREMITY Left 6/10/2016    Procedure: FASCIOTOMY LOWER EXTREMITY;  Surgeon: Mello Rodriguez MD;  [facility]     HC CAPSULE ENDOSCOPY N/A 8/25/2014    Procedure: CAPSULE/PILL CAM ENDOSCOPY;  Surgeon: Remy Haskins MD;  [facility]     HC CAPSULE ENDOSCOPY N/A 10/2/2014    Procedure: CAPSULE/PILL CAM ENDOSCOPY;  Surgeon: Remy Haskins MD;  [facility]     INJECT BLOCK MEDIAL BRANCH CERVICAL/THORACIC/LUMBAR Left 10/30/2020    Procedure: Cervical 3, 4, and 5 Medial Branch Block;  Surgeon: Evelyn Lima MD;  Location: [facility]     ORTHOPEDIC SURGERY      broken wrist repair     REVISE CATHETER INTRATHECAL  08/24/2020     SEX TRANSFORMATION SURGERY, MALE TO FEMALE  1974 1974     SINUS SURGERY      cyst removed     TONSILLECTOMY       VASCULAR SURGERY  2006    Left carotid stent       FAMILY HISTORY:   Family History   Problem Relation Age of Onset     Dementia Mother      Diabetes Mother         type unknown     Coronary Artery Disease Mother         MI     Glaucoma Father      Diabetes Father         type unknown     Heart Failure Father      Schizophrenia Brother      Depression Brother      Suicide Sister      Depression Sister      Diabetes Sister      Ovarian Cancer Maternal Aunt      Leukemia Maternal Aunt      Diabetes Maternal Grandmother      Diabetes Maternal Grandfather      Diabetes Paternal Grandmother      Diabetes Paternal Grandfather      Breast Cancer Sister      Cerebrovascular Disease Brother      Colon Cancer No family hx of      Macular Degeneration No  family hx of        SOCIAL HISTORY:   Social History     Tobacco Use     Smoking status: Current Every Day Smoker     Packs/day: 1.00     Years: 30.00     Pack years: 30.00     Types: Cigarettes, Cigars     Smokeless tobacco: Never Used     Tobacco comment: 1.5 packs per day    Substance Use Topics     Alcohol use: No     Alcohol/week: 0.0 standard drinks       MEDICATIONS:  Medications Prior to Admission   Medication Sig Dispense Refill Last Dose     mupirocin (BACTROBAN) 2 % external ointment Apply 1 each topically daily nares        oxyCODONE (OXY-IR) 5 MG capsule Take 1-2 capsules by mouth every 4 hours as needed for severe pain        ACETAMINOPHEN EXTRA STRENGTH 500 MG tablet TAKE 1 TABLET BY MOUTH EVERY 6 HOURS AS NEEDED FOR PAIN / FEVER 90 tablet 5      ADVAIR DISKUS 250-50 MCG/DOSE inhaler INHALE 1 PUFF BY MOUTH TWICE DAILY 1 Inhaler 5      albuterol (PROAIR HFA/PROVENTIL HFA/VENTOLIN HFA) 108 (90 Base) MCG/ACT inhaler Inhale 2 puffs into the lungs every 6 hours 1 Inhaler 5      albuterol (PROVENTIL) (2.5 MG/3ML) 0.083% neb solution Take 1 vial (2.5 mg) by nebulization every 6 hours as needed for shortness of breath / dyspnea or wheezing 180 mL 3      alendronate (FOSAMAX) 70 MG tablet TAKE ONE TABLET BY MOUTH EVERY 7 DAYS (TAKE WITH 8OZ OF WATER 30 MINUTES BEFORE BREAKFAST AND REMAIN UPRIGHT DURING THIS TIME) 4 tablet 97      ASPERCREME LIDOCAINE 4 % Patch APPLY 1 PATCH TOPICALLY TO LOWER BACK ONCE DAILY (ON FOR 12 HOURS, OFF FOR 12 HOURS) 30 patch 10      ASPIRIN LOW DOSE 81 MG chewable tablet CHEW AND SWALLOW ONE TABLET BY MOUTH IN THE MORNING 28 tablet 10      atorvastatin (LIPITOR) 40 MG tablet TAKE 1 TABLET BY MOUTH AT BEDTIME 28 tablet 10      blood glucose monitoring (ONE TOUCH ULTRA 2) meter device kit Use to test blood sugars 3 times daily or as directed. 1 kit 0      blood glucose monitoring (ONE TOUCH ULTRA) test strip Use to test blood sugars 3 times daily or as directed. 3 Box 3      blood  glucose monitoring (ONE TOUCH ULTRASOFT) lancets Use to test blood sugar 3 times daily or as directed. 100 each PRN      brimonidine (ALPHAGAN) 0.2 % ophthalmic solution Place 1 drop into the right eye 2 times daily ( 12 hours apart).    Please keep 11-16-20 appt for refills. 5 mL 4      capsaicin-menthol-methyl sal 0.025-1-12 % external cream Apply 1 Application topically daily as needed (topical pain) 56.6 g 1      diclofenac (VOLTAREN) 1 % topical gel APPLY 2 GRAMS TOPICALLY TO AFFECTED AREA(S) FOUR TIMES A  g 11      dorzolamide (TRUSOPT) 2 % ophthalmic solution Place 1 drop into the right eye 2 times daily 1 Bottle 1      EPINEPHrine (ANY BX GENERIC EQUIV) 0.3 MG/0.3ML injection 2-pack INJECT 0.3 ML INTO THE MUSCLE AS NEEDED FOR ANAPHYLAXIS 2 each 1      escitalopram (LEXAPRO) 10 MG tablet TAKE 1 TABLET BY MOUTH ONCE DAILY 28 tablet 10      FEROSUL 325 (65 Fe) MG tablet TAKE 1 TABLET BY MOUTH TWICE DAILY WITH MEALS 56 tablet 10      fluticasone (FLONASE) 50 MCG/ACT nasal spray SPRAY 2 SPRAYS IN EACH NOSTRIL DAILY 16 g 11      INCRUSE ELLIPTA 62.5 MCG/INH Inhaler INHALE 1 PUFF BY MOUTH ONCE DAILY 30 each 10      lactulose (CHRONULAC) 10 GM/15ML solution TAKE 30ML (20MG) BY MOUTH AS NEEDED FOR CONSTIPATION 473 mL 1      latanoprost (XALATAN) 0.005 % ophthalmic solution INSTILL ONE DROP IN EACH EYE AT BEDTIME 2.5 mL 10      levETIRAcetam (KEPPRA) 500 MG tablet TAKE 1 TABLET BY MOUTH TWICE DAILY 56 tablet 11      lisinopril (ZESTRIL) 20 MG tablet TAKE 1 TABLET BY MOUTH EVERY MORNING 28 tablet 11      loratadine (CLARITIN) 10 MG tablet TAKE 1 TABLET BY MOUTH ONCE DAILY 28 tablet 11      Menthol, Topical Analgesic, 5 % GEL Externally apply topically daily as needed 113 g 0      metFORMIN (GLUCOPHAGE) 500 MG tablet TAKE 1 TABLET BY MOUTH EVERY EVENING WITH DINNER 28 tablet 11      metoprolol succinate ER (TOPROL-XL) 50 MG 24 hr tablet TAKE 1 TABLET BY MOUTH EVERY MORNING 30 tablet 10      Multiple  Vitamins-Minerals (TAB-A-MACY) TABS TAKE 1 TABLET BY MOUTH ONCE DAILY 28 tablet 11      multivitamin, therapeutic (THERA-VIT) TABS tablet Take 1 tablet by mouth daily        nicotine (COMMIT) 2 MG lozenge Place 1 lozenge (2 mg) inside cheek every hour as needed for smoking cessation (Patient not taking: Reported on 12/14/2020) 60 lozenge 11      nicotine (NICODERM CQ) 14 MG/24HR 24 hr patch APPLY 1 PATCH TOPICALLY DAILY ( EVERY 24 HOURS ) 28 patch 3      nitroGLYcerin (NITROSTAT) 0.4 MG sublingual tablet DISSOLVE ONE TABLET UNDER TONGUE AS NEEDED FOR CHEST PAIN MAY REPEAT EVERY 5 MINUTES FOR 3 DOSES, IF SYMPTOMS PERSIST CALL 911 25 tablet 3      Nutritional Supplements (ENSURE) LIQD DRINK ONE CAN / BOTTLE BY MOUTH TWICE DAILY WITH MEALS 32198 mL 11      pantoprazole (PROTONIX) 40 MG EC tablet TAKE 1 TABLET BY MOUTH EVERY MORNING 28 tablet 11      phenytoin (DILANTIN) 100 MG capsule TAKE 2 CAPS (200MG) BY MOUTH TWICE A  capsule 11      polyethylene glycol (MIRALAX) 17 GM/SCOOP powder MIX 17GM OF POWDER IN 8OZ OF WATER UNTIL COMPLETELY DISSOLVED. DRINK SOLUTION BY MOUTH IN THE MORNING 510 g 8      pregabalin (LYRICA) 150 MG capsule TAKE 1 CAPSULE BY MOUTH THREE TIMES DAILY 90 capsule 4      risperiDONE (RISPERDAL) 0.5 MG tablet TAKE 1 TABLET BY MOUTH AT BEDTIME 28 tablet 4      SENEXON-S 8.6-50 MG tablet TAKE 1 TABLET BY MOUTH TWICE DAILY 56 tablet 10      solifenacin (VESICARE) 10 MG tablet TAKE 1 TABLET BY MOUTH EVERY MORNING 28 tablet 10      traZODone (DESYREL) 150 MG tablet TAKE 1 TABLET BY MOUTH AT BEDTIME 28 tablet 10      vitamin D3 (CHOLECALCIFEROL) 10 MCG (400 UNIT) capsule Take 2 capsules by mouth daily        Vitamin D3 (VITAMIN D) 10 MCG (400 UNIT) tablet TAKE TWO TABLETS (800 UNITS) BY MOUTH ONCE DAILY 56 tablet 11        Allergies:  Allergies   Allergen Reactions     Bee Venom Anaphylaxis     Penicillins Anaphylaxis     Dilantin [Phenytoin] Other (See Comments)     Generic dilantin only per pt  "    Iodine Hives     Novocaine [Procaine] Hives     Tositumomab Unknown       ROS: 10 point ROS were all negative except for pertinent positives noted in my HPI.    Physical exam:   Blood pressure 107/71, pulse 74, temperature 98.4  F (36.9  C), temperature source Oral, resp. rate 16, height 1.092 m (3' 7\"), weight 56.7 kg (125 lb), SpO2 97 %, not currently breastfeeding.  CV: HR and BP as noted above  PULM: breathing comfortably  ABD: soft, non-distended  NEUROLOGIC:  -- Awake; Alert; oriented x 3  -- Follows commands briskly  -- Speech fluent, spontaneous. No aphasia or dysarthria.  -- no gaze preference. No apparent hemineglect.  Cranial Nerves:  -- PERRL 3-2mm bilat and brisk, extraocular movements intact  -- face symmetrical, tongue midline  -- palate elevates symmetrically, uvula midline  -- hearing grossly intact bilat  -- Trapezii 5/5 strength bilat symmetric    Motor:  Normal bulk / tone; no tremor, rigidity, or bradykinesia.  No muscle wasting or fasciculations     Delt Bi Tri Hand Flexion/  Extension Iliopsoas    C5 C6 C7 C8/T1 L2   R 5 5 5 5 5   L 5 5 5 5 4   Sensory:  intact to LT x 4 extremities; midline neck pain, pain with all motions of neck    LABS:  Recent Labs   Lab 12/26/20  0017 12/23/20  1347     --    POTASSIUM 3.9 4.4   CHLORIDE 107  --    CO2 29  --    ANIONGAP 3  --    GLC 96  --    BUN 11  --    CR 0.57  --    CAROL 8.6  --        Recent Labs   Lab 12/26/20  0017   WBC 9.5   RBC 4.00   HGB 12.0   HCT 35.9   MCV 90   MCH 30.0   MCHC 33.4   RDW 13.2          IMAGING:  Recent Results (from the past 24 hour(s))   Head CT w/o contrast    Narrative    EXAM: CT HEAD W/O CONTRAST  LOCATION: [facility]  DATE/TIME: 12/26/2020 12:02 AM    INDICATION: Traumatic injury. Dizziness.  COMPARISON: 12/05/2017 head CT  TECHNIQUE: Routine CT Head without IV contrast. Multiplanar reformats. Dose reduction techniques were used.    FINDINGS:  INTRACRANIAL CONTENTS: No intracranial " hemorrhage, extraaxial collection, or mass effect.  No CT evidence of acute infarct. Mild presumed chronic small vessel ischemic changes. Chronic lacunar infarct in the body of the right caudate nucleus and adjacent   white matter. Mild generalized volume loss. No hydrocephalus. Dense atherosclerotic calcification of the internal carotid arteries and P4 segments of the vertebral arteries.    VISUALIZED ORBITS/SINUSES/MASTOIDS: Prior right cataract surgery. Visualized portions of the orbits are otherwise unremarkable. No paranasal sinus mucosal disease. No middle ear or mastoid effusion.    BONES/SOFT TISSUES: No acute abnormality.      Impression    IMPRESSION:  1.  No CT evidence for acute intracranial process.  2.  Brain atrophy and presumed chronic microvascular ischemic changes as above.   Cervical spine CT w/o contrast    Narrative    EXAM: CT CERVICAL SPINE W/O CONTRAST  LOCATION: [facility]  DATE/TIME: 12/26/2020 12:03 AM    INDICATION: Traumatic injury. Neck pain.  COMPARISON: Cervical spine MRI dated 07/27/2020  TECHNIQUE: Routine CT Cervical Spine without IV contrast. Multiplanar reformats. Dose reduction techniques were used.    FINDINGS:  VERTEBRA: Normal vertebral body heights and alignment. No fracture or posttraumatic subluxation.     CANAL/FORAMINA: Degenerative changes of the cervical spine, with at least mild canal stenosis at C5-C6. Bilateral neural foraminal narrowing is better evaluated on the prior MRI. Degenerative changes of the dens with narrowing of the atlantodental   interval.    PARASPINAL: Left carotid stent. Dense atherosclerotic plaque in the right common and internal carotid artery.      Impression    IMPRESSION:  1.  No evidence of an acute displaced fracture of the cervical spine.  2.  Degenerative changes, without high-grade osseous spinal canal stenosis.   CT Thoracic Spine w/o Contrast    Narrative    EXAM: CT THORACIC SPINE W/O CONTRAST  LOCATION: [facility]  DATE/TIME: 12/26/2020 12:02 AM    INDICATION: Traumatic injury. Fall. Back pain.  COMPARISON: Chest CT dated 06/10/2020  TECHNIQUE: Routine CT Thoracic Spine without IV contrast. Multiplanar reformats. Dose reduction techniques were used.     FINDINGS:  VERTEBRA: Stable accentuation of the thoracic spine kyphosis, with right apex curvature of the mid thoracic spine. Vertebral body heights are stable in height. No evidence of an acute fracture. There is a spine stimulator in place, with leads terminating   at the level of the mid T9 vertebral body and at T10-T11.     CANAL/FORAMINA: Multilevel degenerative endplate changes with marginal osteophytes. No high-grade osseous neural foraminal narrowing.    PARASPINAL: No acute extraspinal abnormality. Dependent atelectasis. Scattered atherosclerotic calcification. Coronary artery disease.      Impression    IMPRESSION:  1.  No evidence of an acute osseous abnormality of the thoracic spine.     XR Shoulder Left Port G/E 2 Views    Narrative    EXAM: LEFT SHOULDER 3 VIEWS  LOCATION: [facility]  DATE/TIME: 12/26/2020 12:08 AM    INDICATION: Fall. Left shoulder pain.  COMPARISON: None.      Impression    IMPRESSION:   1. No visualized acute fracture or malalignment of the left shoulder.  2. Mild degenerative changes in the left acromioclavicular joint.    XR Pelvis w Hip Port Left G/E 2 Views    Narrative    EXAM: XR PELVIS AND HIP PORTABLE LEFT 2 VIEWS  LOCATION: [facility]  DATE/TIME: 12/26/2020 12:08 AM    INDICATION: Injury with pain.  COMPARISON: None.      Impression    IMPRESSION: Demineralization. No fracture or dislocation. Iliac stents.   XR Chest Port 1 View    Narrative    EXAM: XR CHEST PORT 1 VW  LOCATION: [facility]  DATE/TIME: 12/26/2020 2:38 AM    INDICATION: Fall with shoulder pain  COMPARISON: 06/10/2020      Impression    IMPRESSION: Heart size is stable. Chronic interstitial prominence may represent  underlying scarring or fibrosis. No lobar consolidation, overt failure, or large pleural effusions. No pneumothorax. No acute bony abnormalities.       ASSESSMENT:  70 year old female with neck pain after a fall, unable to clear collar.    RECOMMENDATIONS:  No neurosurgical intervention indicated at this time   C-collar on at all times  Follow up in clinic with Shabnam Boyer to clear collar in 2 weeks (will arrange)   Neurosurgery signing off. Call with questions.    The patient was discussed with Dr. Chiang, neurosurgery chief resident, and Dr. Lorezn, neurosurgery staff, and they agree with the above.    MD Sonja Barragan MD, MS    Please contact neurosurgery resident on call with questions.    Dial * * [phone], enter [phone] when prompted.

## 2020-12-26 NOTE — ED NOTES
MD notified that patient has a pain pump in place she states has morphine and fentanyl running. OBS provider contacted and made aware of pump/asked for an order to be entered.

## 2020-12-26 NOTE — ED NOTES
[facility]    ED Nurse to Floor Handoff     Sonya Foote is a 70 year old female who speaks English and lives with others,  in an assisted living  They arrived in the ED by ambulance from home    ED Chief Complaint: Fall    ED Dx;   Final diagnoses:   Fall, initial encounter   Dizziness         Needed?: No    Allergies:   Allergies   Allergen Reactions     Bee Venom Anaphylaxis     Penicillins Anaphylaxis     Dilantin [Phenytoin] Other (See Comments)     Generic dilantin only per pt     Iodine Hives     Novocaine [Procaine] Hives     Tositumomab Unknown   .  Past Medical Hx:   Past Medical History:   Diagnosis Date     Acid reflux disease      Amputation above knee (H)     bilateral     Benign essential hypertension      Blind left eye     due to central retinal artery occlusion     CAD (coronary artery disease)     UA and inferior MI in 2016 s/p PCI     Chronic back pain      Chronic neck pain      Chronic pain syndrome     with fentanyl intrathecal pain pump     Complex sleep apnea syndrome      COPD (chronic obstructive pulmonary disease) (H)      Dysphagia     chronic due to esophageal strictures and multiple previous dilitations      ROGER (generalized anxiety disorder)      History of blood transfusion     x5; no adverse reactions     History of central retinal artery occlusion 2006    left-sided     History of stroke     residual left-sided weakness     Hx of carotid artery stenosis     s/p left carotid stent in 2006 and balloon angioplasty in 2016     MDD (major depressive disorder)      Neurogenic bladder     SPT in place     JOSE (obstructive sleep apnea)      Osteoporosis      PAD (peripheral artery disease) (H)      Peripheral neuropathy      Person who has had sex change operation     male to female     Seizure disorder (H)     many years since last grand mal; daily, brief petit mals     Sickle cell trait (H)      Type 2 diabetes mellitus (H)        Baseline Mental status: WDL  Current Mental Status changes: at basesline    Infection present or suspected this encounter: no  Sepsis suspected: No  Isolation type: No active isolations  Patient tested for COVID 19 prior to admission: YES     Activity level - Baseline/Home:  Total Care  Activity Level - Current:   Wheelchair    Bariatric equipment needed?: No    In the ED these meds were given:   Medications   fentaNYL (PF) (SUBLIMAZE) injection 12.5 mcg (12.5 mcg Intravenous Given 12/26/20 0151)       Drips running?  No    Home pump  Yes    Current LDAs  Peripheral IV 12/26/20 Anterior;Right Upper forearm (Active)   Site Assessment WDL 12/26/20 0138   Line Status Saline locked 12/26/20 0138   Dressing Intervention New dressing  12/26/20 0138   Phlebitis Scale 0-->no symptoms 12/26/20 0138   Infiltration Scale 0 12/26/20 0138   Infiltration Site Treatment Method  None 12/26/20 0138   Number of days: 0       Suprapubic Catheter Double-lumen;Latex 14 fr suprapubic tube exchange (Active)   Number of days: 79       Pressure Injury 06/10/20 Medial Coccyx small open area (Active)   Number of days: 199       Labs results:   Labs Ordered and Resulted from Time of ED Arrival Up to the Time of Departure from the ED   COMPREHENSIVE METABOLIC PANEL - Abnormal; Notable for the following components:       Result Value    Albumin 2.9 (*)     Alkaline Phosphatase 254 (*)     ALT 78 (*)     AST 55 (*)     All other components within normal limits   ROUTINE UA WITH MICROSCOPIC - Abnormal; Notable for the following components:    Blood Urine Trace (*)     Nitrite Urine Positive (*)     Leukocyte Esterase Urine Large (*)     WBC Urine 13 (*)     Bacteria Urine Few (*)     Amorphous Crystals Few (*)     All other components within normal limits   CBC WITH PLATELETS DIFFERENTIAL   INR   LACTIC ACID WHOLE BLOOD   TROPONIN I   SARS-COV-2 (COVID-19) VIRUS RT-PCR   ISTAT CG8 GAS ELEC ICA GLUC TIKA NURSE POCT   URINE CULTURE AEROBIC  BACTERIAL       Imaging Studies:   Recent Results (from the past 24 hour(s))   Head CT w/o contrast    Narrative    EXAM: CT HEAD W/O CONTRAST  LOCATION: [facility]  DATE/TIME: 12/26/2020 12:02 AM    INDICATION: Traumatic injury. Dizziness.  COMPARISON: 12/05/2017 head CT  TECHNIQUE: Routine CT Head without IV contrast. Multiplanar reformats. Dose reduction techniques were used.    FINDINGS:  INTRACRANIAL CONTENTS: No intracranial hemorrhage, extraaxial collection, or mass effect.  No CT evidence of acute infarct. Mild presumed chronic small vessel ischemic changes. Chronic lacunar infarct in the body of the right caudate nucleus and adjacent   white matter. Mild generalized volume loss. No hydrocephalus. Dense atherosclerotic calcification of the internal carotid arteries and P4 segments of the vertebral arteries.    VISUALIZED ORBITS/SINUSES/MASTOIDS: Prior right cataract surgery. Visualized portions of the orbits are otherwise unremarkable. No paranasal sinus mucosal disease. No middle ear or mastoid effusion.    BONES/SOFT TISSUES: No acute abnormality.      Impression    IMPRESSION:  1.  No CT evidence for acute intracranial process.  2.  Brain atrophy and presumed chronic microvascular ischemic changes as above.   Cervical spine CT w/o contrast    Narrative    EXAM: CT CERVICAL SPINE W/O CONTRAST  LOCATION: [facility]  DATE/TIME: 12/26/2020 12:03 AM    INDICATION: Traumatic injury. Neck pain.  COMPARISON: Cervical spine MRI dated 07/27/2020  TECHNIQUE: Routine CT Cervical Spine without IV contrast. Multiplanar reformats. Dose reduction techniques were used.    FINDINGS:  VERTEBRA: Normal vertebral body heights and alignment. No fracture or posttraumatic subluxation.     CANAL/FORAMINA: Degenerative changes of the cervical spine, with at least mild canal stenosis at C5-C6. Bilateral neural foraminal narrowing is better evaluated on the prior MRI. Degenerative changes of the dens with  narrowing of the atlantodental   interval.    PARASPINAL: Left carotid stent. Dense atherosclerotic plaque in the right common and internal carotid artery.      Impression    IMPRESSION:  1.  No evidence of an acute displaced fracture of the cervical spine.  2.  Degenerative changes, without high-grade osseous spinal canal stenosis.   CT Thoracic Spine w/o Contrast    Narrative    EXAM: CT THORACIC SPINE W/O CONTRAST  LOCATION: [facility]  DATE/TIME: 12/26/2020 12:02 AM    INDICATION: Traumatic injury. Fall. Back pain.  COMPARISON: Chest CT dated 06/10/2020  TECHNIQUE: Routine CT Thoracic Spine without IV contrast. Multiplanar reformats. Dose reduction techniques were used.     FINDINGS:  VERTEBRA: Stable accentuation of the thoracic spine kyphosis, with right apex curvature of the mid thoracic spine. Vertebral body heights are stable in height. No evidence of an acute fracture. There is a spine stimulator in place, with leads terminating   at the level of the mid T9 vertebral body and at T10-T11.     CANAL/FORAMINA: Multilevel degenerative endplate changes with marginal osteophytes. No high-grade osseous neural foraminal narrowing.    PARASPINAL: No acute extraspinal abnormality. Dependent atelectasis. Scattered atherosclerotic calcification. Coronary artery disease.      Impression    IMPRESSION:  1.  No evidence of an acute osseous abnormality of the thoracic spine.     XR Shoulder Left Port G/E 2 Views    Narrative    EXAM: LEFT SHOULDER 3 VIEWS  LOCATION: [facility]  DATE/TIME: 12/26/2020 12:08 AM    INDICATION: Fall. Left shoulder pain.  COMPARISON: None.      Impression    IMPRESSION:   1. No visualized acute fracture or malalignment of the left shoulder.  2. Mild degenerative changes in the left acromioclavicular joint.    XR Pelvis w Hip Port Left G/E 2 Views    Narrative    EXAM: XR PELVIS AND HIP PORTABLE LEFT 2 VIEWS  LOCATION: [facility]  DATE/TIME: 12/26/2020  "12:08 AM    INDICATION: Injury with pain.  COMPARISON: None.      Impression    IMPRESSION: Demineralization. No fracture or dislocation. Iliac stents.   XR Chest Port 1 View    Narrative    EXAM: XR CHEST PORT 1 VW  LOCATION: [facility]  DATE/TIME: 12/26/2020 2:38 AM    INDICATION: Fall with shoulder pain  COMPARISON: 06/10/2020      Impression    IMPRESSION: Heart size is stable. Chronic interstitial prominence may represent underlying scarring or fibrosis. No lobar consolidation, overt failure, or large pleural effusions. No pneumothorax. No acute bony abnormalities.       Recent vital signs:   /83   Pulse 74   Temp 97.7  F (36.5  C) (Oral)   Resp 16   Ht 1.092 m (3' 7\")   Wt 56.7 kg (125 lb)   SpO2 91%   BMI 47.53 kg/m      Kindra Coma Scale Score: 15 (12/25/20 2259)       Cardiac Rhythm: Normal Sinus  Pt needs tele? No  Skin/wound Issues: None    Code Status: See epic    Pain control: fair    Nausea control: pt had none    Abnormal labs/tests/findings requiring intervention:     Family present during ED course? No   Family Comments/Social Situation comments: bilateral above knee amputation     Tasks needing completion: None    Eryn Green, RN  [phone] [facility]    "

## 2020-12-26 NOTE — H&P
[facility]     History and Physical - ED Observation Service       Date of Admission:  12/25/2020    Assessment & Plan   Sonya Foote is a 70 year old female admitted on 12/25/2020. She has a PMH of seizure disorder, hx of CAD w/ inferior MI in 2016 s/p PCI, CVA, HTN, DM2, GERD with previous strictures and dilation, COPD, chronic dyspnea, JOSE, ROGER, MDD, DJD, fibromyalgia, chronic pain syndrome, and neurogenic bladder, bilateral LE amputations, chronic neuropathy, chronic neck pain (cervical 3, 4, 5 medial branch blocks on the left neck on 10/30/20), epilepsy (petit mal almost daily; last grand mal seizure 10-15 years ago) who presents to the ED via EMS from congregate care facility after a fall, reporting head, neck, upper back, left shoulder and left hip pain.     #. Fall  #. Syncope  Patient reports feeling thirsty, turned in bed to try to get a glass of water, felt somewhat dizzy, and then fell out of the bed. She recalls prior lightheadedness, dark vision, palpitations. Admits to head trauma and LOC. Though in the ED denied LOC; however currently states she had a period of being unaware of what was going on. Landed on the ground between the nightstand and a nearby heater.  In doing so she reports hitting the left side of her head on the ground as well as her left hip and shoulder which took the brunt of this fall. Denies loss of bowel and bladder control; no tongue biting. Neck pain worse than baseline. Patient brought in by EMS (C-collar applied upon arrival to ED), with concern of multiple pain complaints after a fall.  Chronic history of neuropathy; pain is mostly over the lateral portion of the shoulder over the deltoid area. Upper back discomfort. Left hip sore throughout the lateral portion of the hip  vertiginous symptoms.  There is no current dizziness, there are no other preceding symptoms.  Denies chest pain, nausea or vomiting, abdominal symptoms, new  urinary symptoms, vision or hearing changes or speech changes. In ED, VSS. CMP with albumin 2.9, , ALT 78, AST 55 otherwise normal. Normal CBC. Lactic acid 1.0. Troponin I negative. INR 1.09. UA positive or nitrite, trace blood, large LE, 13 WBC, 1 RBC, few bacteria, few amorphous crystals. UCx pending (previous cultures with pseudomonas - ? Colonization). EKG NSR. Covid19 negative. CT Head negative. CT C Spine and T Spine negative. Left houlder xray, pelvis and hip xray, and CXR all negative. In the ED the patient was given fentanyl 12.5mcg IV x 1. Patient was evaluated by Trauma and NSG and awaiting final recommendations. In the mean time, they recommended aspen collar could be taken off. She is being admitted for a syncope work up and PT/OT evaluation. Ddx: arrhythmia, ACS (less likely), seizure, syncope  - Serial troponins q4h x 2 more  - Check TSH, Mag, Phos, Keppra and Dilantin level  - Follow up UCx. Will hold off on starting A  - Continuous telemetry  - Resting Echo in the morning  - Keep Mag > 2, K > 4  - Orthostatic vitals now and qshift  - Ambulate with assistance  - Consider discharge with Ziopatch  - Trauma consult - awaiting recommendations  - Neurosurgery - awaiting recommendations    #. DM2: On Metformin per Med Rec from facility. Last A1c was 5 on 8/19/20   - Continue PTA Metformin  - BG checks TID ac  - Check HgbA1c  - Carbohydrate Consistent Diet  - Hypoglycemic protocol    #. Hx of Seizures: Pt reports grand mal 15 years ago, petit mal seizures on a daily basis which usually is just being absent briefly  - Seizure precautions  - Continue PTA Keppra, PTA Dilantin  - Check Keppra and Dilantin levels    #. Fibromyalgia  #. DJD  #. Chronic Pain: Patient has a pain pump that delivers Fentanyl and ? Morphine. Follow at [facility] and states she has it refilled every 6 weeks. Unable to find records of pump details  - Pharmacy IP consult for pump settings  - Continue PTA oxycodone 5-10mg  q6h, lidocaine patch, capsaicin cream, pregabalin    --- CHRONIC ISSUES ---  #. Benign essential HTN  #. CAD  #. History of CVA  Reported residual left sided weakness  - Continue PTA lisinopril and metoprolol    #. Mild JOSE: - Continue PTA CPAP    #. COPD: - Hold Incruse and Advair  - DuoNeb 4x daily while she is here.  - Pulmicort neb bid while here     #. Schizophrenia: - Continue PTA risperdal and lexapro     #. Neurogenic bladder - Chronic suprapubic catheter in place, s/p botox injections     #. Osteoporosis - pta alendronate weekly, order once day of the week confirmed    #. Seasonal allergies, chronic sinusitis: - Continue PTA Loratadine. Hold Flonas    #. Anemia: Baseline 12-13. H/H 12/35.9  - Continue with PTA Ferrous sulfate      #. PVD   #. Bilateral Above Knee Amputation: Nov 2016 2/2 DM and PVD. Pt uses electronic wheelchair and lives in [facility] in [place]. Care coordinator consult placed for dispo planning.     #. B/L AKA: Utilizes prosthesis, did not bring them with her     #. Blind in left eye: Also has limited poor vision in right eye.     #. Tobacco Abuse: 75 pack year history.   - Nicotine patch     #. GERD: - Continue PTA Protonix     #. Incontinence: Suprapubic catheter in place. History of UTIs with this. Continue PTA Detrol LA. Urine culture pending.         Diet:   Diabetic diet  DVT Prophylaxis: Low Risk/Ambulatory with no VTE prophylaxis indicated  Landrum Catheter: not present  Code Status:   full code         Disposition Plan   Expected discharge: Today, recommended to prior living arrangement once adequate pain management/ tolerating PO medications and syncope work up completed.  Entered: MEE Baltazar 12/26/2020, 4:44 AM     The patient's care was discussed with the Attending Physician, Dr. Wagner.    MEE Baltazar  [facility]   Contact information available via [facility]  "Paging/Directory      ______________________________________________________________________    Chief Complaint   Fall    History is obtained from the patient    History of Present Illness   Sonya Foote is a 70 year old female (gender affirming surgery 1974), with a PMH of seizure disorder, hx of CAD w/ inferior MI in 2016 s/p PCI, CVA, HTN, DM2, GERD with previous strictures and dilation, COPD, chronic dyspnea, JOSE, ROGER, MDD, DJD, fibromyalgia, chronic pain syndrome, and neurogenic bladder, bilateral LE amputations, chronic neuropathy, chronic neck pain (cervical 3, 4, 5 medial branch blocks on the left neck on 10/30/20), epilepsy (petit mal almost daily; last grand mal seizure 10-15 years ago) who presents to the ED via EMS from congregate care facility after a fall, reporting head, neck, upper back, left shoulder and left hip pain.     Per ED Note: \"Patient was brought in by EMS (C-collar applied upon arrival to ED), with concern of multiple pain complaints after a fall. Patient reports that she had a fall from bed prior to arrival.  Patient reports feeling thirsty, turned in bed to try to get a glass of water, felt somewhat dizzy, and then fell out of the bed.  In doing so she landed on the ground, and between the nightstand in a nearby heater.  In doing so she reports hitting the left side of her head on the ground as well as her left hip and shoulder which took the brunt of this fall.  Since having this fall from bed height she is reporting head pain, but denies loss of consciousness.  Neck pain diffusely throughout the whole neck.  She reports that she chronically has neck pain in the setting of known arthritis but this is worse than usual.  As well as left shoulder pain.  No radiation of pain to the arm.  Has chronic history of neuropathy; pain is mostly over the lateral portion of the shoulder over the deltoid area.  Denies more central or clavicular type pain, no significant posterior shoulder pain.  Does " "not feel as though the shoulder is out of joint.  She also reports some upper back discomfort.  No particular low back discomfort.  The left hip that is sore throughout the lateral portion of the hip, not really in the groin/joint area.  She has bilateral lower extremity amputations but does not have any trouble with the distal portion of the limb stumps.     As mentioned, she did not have any LOC.  Outside of when she turned and having a brief moment of dizziness she was feeling well prior to this.  The dizziness was only when she turned quickly and there was no associated vertiginous symptoms.  There is no current dizziness, there are no other preceding symptoms.  No chest pain or palpitations.  No lightheadedness, syncope or near syncope.  No chest discomfort, nausea or vomiting.  No abdominal symptoms.  Has a neurogenic bladder and has not had any new urinary symptoms.  No vision or hearing changes.  No mouth, jaw or HEENT symptoms reported though she does not like the sensation of the c-collar that was placed on arrival.  No speech changes.     Patient also reports having history of fibromyalgia and so says can have significant pain as well, in addition to her chronic neck pain, but says all of these usual pain complaints are worse since the fall.  Otherwise reports feeling healthy.  No fevers, or etc.  No other new symptoms or complaints at this time.  Please see ROS for further details.\"    In the ED, HR 60's-70's, -131/64-83, RR 16, SaO2 % on RA, Temp 97.7. Labs show CMP with albumin 2.9, , ALT 78, AST 55 otherwise normal. Normal CBC. Lactic acid 1.0. Troponin I negative. INR 1.09. UA positive or nitrite, trace blood, large LE, 13 WBC, 1 RBC, few bacteria, few amorphous crystals. Covid19 negative. CT Head negative. CT C Spine and T Spine negative. Left houlder xray, pelvis and hip xray, and CXR all negative. In the ED the patient was given fentanyl 12.5mcg IV x 1. Patient was evaluated by " Trauma and NSG and awaiting final recommendations. In the mean time, they recommended aspen collar could be taken off. She is being admitted for a syncope work up and PT/OT evaluation.     Review of Systems    All other ROS negative except those mentioned in above note.      Past Medical History    I have reviewed this patient's medical history and updated it with pertinent information if needed.   Past Medical History:   Diagnosis Date     Acid reflux disease      Amputation above knee (H)     bilateral     Benign essential hypertension      Blind left eye     due to central retinal artery occlusion     CAD (coronary artery disease)     UA and inferior MI in 2016 s/p PCI     Chronic back pain      Chronic neck pain      Chronic pain syndrome     with fentanyl intrathecal pain pump     Complex sleep apnea syndrome      COPD (chronic obstructive pulmonary disease) (H)      Dysphagia     chronic due to esophageal strictures and multiple previous dilitations      ROGER (generalized anxiety disorder)      History of blood transfusion     x5; no adverse reactions     History of central retinal artery occlusion 2006    left-sided     History of stroke     residual left-sided weakness     Hx of carotid artery stenosis     s/p left carotid stent in 2006 and balloon angioplasty in 2016     MDD (major depressive disorder)      Neurogenic bladder     SPT in place     JOSE (obstructive sleep apnea)      Osteoporosis      PAD (peripheral artery disease) (H)      Peripheral neuropathy      Person who has had sex change operation     male to female     Seizure disorder (H)     many years since last grand mal; daily, brief petit mals     Sickle cell trait (H)      Type 2 diabetes mellitus (H)        Past Surgical History   I have reviewed this patient's surgical history and updated it with pertinent information if needed.  Past Surgical History:   Procedure Laterality Date     AMPUTATE LEG ABOVE KNEE Left 6/11/2016    Procedure:  AMPUTATE LEG ABOVE KNEE;  Surgeon: Mello Rodriguez MD;  [facility]     AMPUTATE LEG BELOW KNEE Right 11/7/2016    Procedure: AMPUTATE LEG BELOW KNEE;  Surgeon: Savannah Durant MD;  [facility]     AMPUTATE REVISION STUMP LOWER EXTREMITY Right 11/11/2016    Procedure: AMPUTATE REVISION STUMP LOWER EXTREMITY;  Surgeon: Savannah Durant MD;  [facility]     AMPUTATE REVISION STUMP LOWER EXTREMITY Right 11/16/2016    Procedure: AMPUTATE REVISION STUMP LOWER EXTREMITY;  Surgeon: Savannah Durant MD;  [facility]     AMPUTATE TOE(S) Right 1/5/2016    Procedure: AMPUTATE TOE(S);  Surgeon: Mello Gaines DPM;  Location: [facility]     ANGIOGRAM Bilateral 11/21/2014    Procedure: ANGIOGRAM;  Surgeon: Savannah Durant MD;  [facility]     ANGIOGRAM Left 1/16/2015    Procedure: ANGIOGRAM;  Surgeon: Savannah Durant MD;  [facility]     ANGIOGRAM Bilateral 9/14/2015    Procedure: ANGIOGRAM;  Surgeon: Savannah Durant MD;  [facility]     ANGIOGRAM Left 10/12/2015    Procedure: ANGIOGRAM;  Surgeon: Savannah Durant MD;  [facility]     ANGIOGRAM Right 6/6/2016    Procedure: ANGIOGRAM;  Surgeon: Savannah Durant MD;  [facility]     ANGIOPLASTY Right 6/6/2016    Procedure: ANGIOPLASTY;  Surgeon: Savannah Durant MD;  [facility]     APPENDECTOMY       BREAST BIOPSY, RT/LT Right     benign     CATARACT IOL, RT/LT Right      CHOLECYSTECTOMY       COLONOSCOPY N/A 8/25/2014    Procedure: COLONOSCOPY;  Surgeon: Mello Ferrer MD;  [facility]     COLONOSCOPY WITH CO2 INSUFFLATION N/A 8/20/2014    Procedure: COLONOSCOPY WITH CO2 INSUFFLATION;  Surgeon: Duane, William Charles, MD;  Location: [facility]     CYSTOSCOPY, INJECT BOTOX N/A 5/21/2020    Procedure: CYSTOSCOPY, WITH BOTULINUM TOXIN INJECTION;  Surgeon: Loki Gordon MD;  [facility]     CYSTOSCOPY, INJECT BOTOX N/A 10/8/2020    Procedure: CYSTOSCOPY, INJECT BOTOX INTO BLADDER, SUPRAPUBIC TUBE EXCHNAGE;  Surgeon: Loki Gordon MD;   [facility]     CYSTOSCOPY, INTRAVESICAL INJECTION N/A 10/31/2019    Procedure: CYSTOSCOPY, BOTOX INJECTION, Suprapubic Catheter Exchange;  Surgeon: Loki Gordon MD;  [facility]     CYSTOSTOMY, INSERT TUBE SUPRAPUBIC, COMBINED N/A 1/16/2018    Procedure: COMBINED CYSTOSTOMY, INSERT TUBE SUPRAPUBIC;  Cystoscopy, Intraoperative Ultrasound, Suprapubic Tube Placement;  Surgeon: Keanu Dawson MD;  [facility]     ENDARTERECTOMY FEMORAL  5/23/2014    Procedure: ENDARTERECTOMY FEMORAL;  Surgeon: Jason Joshi MD;  [facility]     ESOPHAGOSCOPY, GASTROSCOPY, DUODENOSCOPY (EGD), COMBINED  12/14/2012    Procedure: COMBINED ESOPHAGOSCOPY, GASTROSCOPY, DUODENOSCOPY (EGD), BIOPSY SINGLE OR MULTIPLE;  ESOPHAGOSCOPY, GASTROSCOPY, DUODENOSCOPY (EGD), DILATATION ;  Surgeon: Elizabeth Stevenson MD;  Location:  GI     ESOPHAGOSCOPY, GASTROSCOPY, DUODENOSCOPY (EGD), COMBINED  12/31/2013    Procedure: COMBINED ESOPHAGOSCOPY, GASTROSCOPY, DUODENOSCOPY (EGD), BIOPSY SINGLE OR MULTIPLE;;  Surgeon: Clemente Lopez MD;  [facility]     ESOPHAGOSCOPY, GASTROSCOPY, DUODENOSCOPY (EGD), COMBINED  4/1/2014    Procedure: COMBINED ESOPHAGOSCOPY, GASTROSCOPY, DUODENOSCOPY (EGD);;  Surgeon: Clemente Lopez MD;  [facility]     ESOPHAGOSCOPY, GASTROSCOPY, DUODENOSCOPY (EGD), COMBINED  6/28/2014    Procedure: COMBINED ESOPHAGOSCOPY, GASTROSCOPY, DUODENOSCOPY (EGD);  Surgeon: Clemente Lopez MD;  [facility]     ESOPHAGOSCOPY, GASTROSCOPY, DUODENOSCOPY (EGD), COMBINED N/A 8/20/2014    Procedure: COMBINED ESOPHAGOSCOPY, GASTROSCOPY, DUODENOSCOPY (EGD), BIOPSY SINGLE OR MULTIPLE;  Surgeon: Duane, William Charles, MD;  [facility]: [facility]     ESOPHAGOSCOPY, GASTROSCOPY, DUODENOSCOPY (EGD), COMBINED N/A 8/22/2014    Procedure: COMBINED ESOPHAGOSCOPY, GASTROSCOPY, DUODENOSCOPY (EGD), BIOPSY SINGLE OR MULTIPLE;  Surgeon: Mello Ferrer MD;  Location:  GI     ESOPHAGOSCOPY, GASTROSCOPY, DUODENOSCOPY  (EGD), COMBINED N/A 10/2/2014    Procedure: COMBINED ESOPHAGOSCOPY, GASTROSCOPY, DUODENOSCOPY (EGD), BIOPSY SINGLE OR MULTIPLE;  Surgeon: Remy Haskins MD;  [facility]     ESOPHAGOSCOPY, GASTROSCOPY, DUODENOSCOPY (EGD), COMBINED Left 12/15/2014    Procedure: COMBINED ESOPHAGOSCOPY, GASTROSCOPY, DUODENOSCOPY (EGD), BIOPSY SINGLE OR MULTIPLE;  Surgeon: Remy Haskins MD;  [facility]     ESOPHAGOSCOPY, GASTROSCOPY, DUODENOSCOPY (EGD), COMBINED N/A 2/25/2015    Procedure: COMBINED ENDOSCOPIC ULTRASOUND, ESOPHAGOSCOPY, GASTROSCOPY, DUODENOSCOPY (EGD), FINE NEEDLE ASPIRATE/BIOPSY;  Surgeon: Clemente Lugo MD;  [facility]     ESOPHAGOSCOPY, GASTROSCOPY, DUODENOSCOPY (EGD), COMBINED Left 2/25/2015    Procedure: COMBINED ESOPHAGOSCOPY, GASTROSCOPY, DUODENOSCOPY (EGD), BIOPSY SINGLE OR MULTIPLE;  Surgeon: Clemente Lugo MD;  [facility]     ESOPHAGOSCOPY, GASTROSCOPY, DUODENOSCOPY (EGD), COMBINED N/A 9/25/2016    Procedure: COMBINED ESOPHAGOSCOPY, GASTROSCOPY, DUODENOSCOPY (EGD);  Surgeon: Aziza Patiño MD;  [facility]     ESOPHAGOSCOPY, GASTROSCOPY, DUODENOSCOPY (EGD), COMBINED N/A 1/18/2017    Procedure: COMBINED ESOPHAGOSCOPY, GASTROSCOPY, DUODENOSCOPY (EGD), BIOPSY SINGLE OR MULTIPLE;  Surgeon: Clemente Lopez MD;  [facility]     ESOPHAGOSCOPY, GASTROSCOPY, DUODENOSCOPY (EGD), COMBINED N/A 11/26/2017    Procedure: COMBINED ESOPHAGOSCOPY, GASTROSCOPY, DUODENOSCOPY (EGD), REMOVE FOREIGN BODY;  Esophagogastroduodenoscopy with foreign body extraction  ;  Surgeon: Herberth Castrejon MD;  [facility]     ESOPHAGOSCOPY, GASTROSCOPY, DUODENOSCOPY (EGD), COMBINED N/A 11/26/2017    Procedure: COMBINED ESOPHAGOSCOPY, GASTROSCOPY, DUODENOSCOPY (EGD), REMOVE FOREIGN BODY;;  Surgeon: Herberth Castrejon MD;  Location:  GI     ESOPHAGOSCOPY, GASTROSCOPY, DUODENOSCOPY (EGD), COMBINED N/A 4/10/2020    Procedure: ESOPHAGOGASTRODUODENOSCOPY (EGD);  Surgeon: Sultan  MD Yael;  [facility]     FASCIOTOMY LOWER EXTREMITY Left 6/10/2016    Procedure: FASCIOTOMY LOWER EXTREMITY;  Surgeon: Mello Rodriguez MD;  [facility]     HC CAPSULE ENDOSCOPY N/A 8/25/2014    Procedure: CAPSULE/PILL CAM ENDOSCOPY;  Surgeon: Remy Haskins MD;  [facility]     HC CAPSULE ENDOSCOPY N/A 10/2/2014    Procedure: CAPSULE/PILL CAM ENDOSCOPY;  Surgeon: Remy Haskins MD;  [facility]     INJECT BLOCK MEDIAL BRANCH CERVICAL/THORACIC/LUMBAR Left 10/30/2020    Procedure: Cervical 3, 4, and 5 Medial Branch Block;  Surgeon: Evelyn Lima MD;  [place] [facility] [place]     ORTHOPEDIC SURGERY      broken wrist repair     REVISE CATHETER INTRATHECAL  08/24/2020     SEX TRANSFORMATION SURGERY, MALE TO FEMALE  1974 1974     SINUS SURGERY      cyst removed     TONSILLECTOMY       VASCULAR SURGERY  2006    Left carotid stent       Social History   I have reviewed this patient's social history and updated it with pertinent information if needed.  Social History     Tobacco Use     Smoking status: Current Every Day Smoker     Packs/day: 1.00     Years: 30.00     Pack years: 30.00     Types: Cigarettes, Cigars     Smokeless tobacco: Never Used     Tobacco comment: 1.5 packs per day    Substance Use Topics     Alcohol use: No     Alcohol/week: 0.0 standard drinks     Drug use: No       Family History   I have reviewed this patient's family history and updated it with pertinent information if needed.  Family History   Problem Relation Age of Onset     Dementia Mother      Diabetes Mother         type unknown     Coronary Artery Disease Mother         MI     Glaucoma Father      Diabetes Father         type unknown     Heart Failure Father      Schizophrenia Brother      Depression Brother      Suicide Sister      Depression Sister      Diabetes Sister      Ovarian Cancer Maternal Aunt      Leukemia Maternal Aunt      Diabetes Maternal Grandmother      Diabetes Maternal Grandfather       Diabetes Paternal Grandmother      Diabetes Paternal Grandfather      Breast Cancer Sister      Cerebrovascular Disease Brother      Colon Cancer No family hx of      Macular Degeneration No family hx of        Prior to Admission Medications   Prior to Admission Medications   Prescriptions Last Dose Informant Patient Reported? Taking?   ACETAMINOPHEN EXTRA STRENGTH 500 MG tablet   No No   Sig: TAKE 1 TABLET BY MOUTH EVERY 6 HOURS AS NEEDED FOR PAIN / FEVER   ADVAIR DISKUS 250-50 MCG/DOSE inhaler   No No   Sig: INHALE 1 PUFF BY MOUTH TWICE DAILY   ASPERCREME LIDOCAINE 4 % Patch   No No   Sig: APPLY 1 PATCH TOPICALLY TO LOWER BACK ONCE DAILY (ON FOR 12 HOURS, OFF FOR 12 HOURS)   ASPIRIN LOW DOSE 81 MG chewable tablet   No No   Sig: CHEW AND SWALLOW ONE TABLET BY MOUTH IN THE MORNING   EPINEPHrine (ANY BX GENERIC EQUIV) 0.3 MG/0.3ML injection 2-pack   No No   Sig: INJECT 0.3 ML INTO THE MUSCLE AS NEEDED FOR ANAPHYLAXIS   FEROSUL 325 (65 Fe) MG tablet   No No   Sig: TAKE 1 TABLET BY MOUTH TWICE DAILY WITH MEALS   INCRUSE ELLIPTA 62.5 MCG/INH Inhaler   No No   Sig: INHALE 1 PUFF BY MOUTH ONCE DAILY   Menthol, Topical Analgesic, 5 % GEL   No No   Sig: Externally apply topically daily as needed   Multiple Vitamins-Minerals (TAB-A-MACY) TABS   No No   Sig: TAKE 1 TABLET BY MOUTH ONCE DAILY   Nutritional Supplements (ENSURE) LIQD   No No   Sig: DRINK ONE CAN / BOTTLE BY MOUTH TWICE DAILY WITH MEALS   SENEXON-S 8.6-50 MG tablet   No No   Sig: TAKE 1 TABLET BY MOUTH TWICE DAILY   Vitamin D3 (VITAMIN D) 10 MCG (400 UNIT) tablet   No No   Sig: TAKE TWO TABLETS (800 UNITS) BY MOUTH ONCE DAILY   albuterol (PROAIR HFA/PROVENTIL HFA/VENTOLIN HFA) 108 (90 Base) MCG/ACT inhaler   No No   Sig: Inhale 2 puffs into the lungs every 6 hours   albuterol (PROVENTIL) (2.5 MG/3ML) 0.083% neb solution   No No   Sig: Take 1 vial (2.5 mg) by nebulization every 6 hours as needed for shortness of breath / dyspnea or wheezing   alendronate (FOSAMAX)  70 MG tablet   No No   Sig: TAKE ONE TABLET BY MOUTH EVERY 7 DAYS (TAKE WITH 8OZ OF WATER 30 MINUTES BEFORE BREAKFAST AND REMAIN UPRIGHT DURING THIS TIME)   atorvastatin (LIPITOR) 40 MG tablet   No No   Sig: TAKE 1 TABLET BY MOUTH AT BEDTIME   blood glucose monitoring (ONE TOUCH ULTRA 2) meter device kit  Nursing Home No No   Sig: Use to test blood sugars 3 times daily or as directed.   blood glucose monitoring (ONE TOUCH ULTRA) test strip  Nursing Home No No   Sig: Use to test blood sugars 3 times daily or as directed.   blood glucose monitoring (ONE TOUCH ULTRASOFT) lancets  Nursing Home No No   Sig: Use to test blood sugar 3 times daily or as directed.   brimonidine (ALPHAGAN) 0.2 % ophthalmic solution   No No   Sig: Place 1 drop into the right eye 2 times daily ( 12 hours apart).    Please keep 11-16-20 appt for refills.   capsaicin-menthol-methyl sal 0.025-1-12 % external cream   No No   Sig: Apply 1 Application topically daily as needed (topical pain)   diclofenac (VOLTAREN) 1 % topical gel   No No   Sig: APPLY 2 GRAMS TOPICALLY TO AFFECTED AREA(S) FOUR TIMES A DAY   dorzolamide (TRUSOPT) 2 % ophthalmic solution   No No   Sig: Place 1 drop into the right eye 2 times daily   dorzolamide (TRUSOPT) 2 % ophthalmic solution   No No   Sig: Place 1 drop into the right eye 2 times daily   escitalopram (LEXAPRO) 10 MG tablet   No No   Sig: TAKE 1 TABLET BY MOUTH ONCE DAILY   fluticasone (FLONASE) 50 MCG/ACT nasal spray   No No   Sig: SPRAY 2 SPRAYS IN EACH NOSTRIL DAILY   lactulose (CHRONULAC) 10 GM/15ML solution   No No   Sig: TAKE 30ML (20MG) BY MOUTH AS NEEDED FOR CONSTIPATION   latanoprost (XALATAN) 0.005 % ophthalmic solution   No No   Sig: INSTILL ONE DROP IN EACH EYE AT BEDTIME   levETIRAcetam (KEPPRA) 500 MG tablet   No No   Sig: TAKE 1 TABLET BY MOUTH TWICE DAILY   lisinopril (ZESTRIL) 20 MG tablet   No No   Sig: TAKE 1 TABLET BY MOUTH EVERY MORNING   loratadine (CLARITIN) 10 MG tablet   No No   Sig: TAKE 1  TABLET BY MOUTH ONCE DAILY   metFORMIN (GLUCOPHAGE) 500 MG tablet   No No   Sig: TAKE 1 TABLET BY MOUTH EVERY EVENING WITH DINNER   metoprolol succinate ER (TOPROL-XL) 50 MG 24 hr tablet   No No   Sig: TAKE 1 TABLET BY MOUTH EVERY MORNING   multivitamin, therapeutic (THERA-VIT) TABS tablet   No No   Sig: Take 1 tablet by mouth daily   nicotine (COMMIT) 2 MG lozenge   No No   Sig: Place 1 lozenge (2 mg) inside cheek every hour as needed for smoking cessation   Patient not taking: Reported on 12/14/2020   nicotine (NICODERM CQ) 14 MG/24HR 24 hr patch   No No   Sig: APPLY 1 PATCH TOPICALLY DAILY ( EVERY 24 HOURS )   nitroGLYcerin (NITROSTAT) 0.4 MG sublingual tablet   No No   Sig: DISSOLVE ONE TABLET UNDER TONGUE AS NEEDED FOR CHEST PAIN MAY REPEAT EVERY 5 MINUTES FOR 3 DOSES, IF SYMPTOMS PERSIST CALL 911   pantoprazole (PROTONIX) 40 MG EC tablet   No No   Sig: TAKE 1 TABLET BY MOUTH EVERY MORNING   phenytoin (DILANTIN) 100 MG capsule   No No   Sig: TAKE 2 CAPS (200MG) BY MOUTH TWICE A DAY   phenytoin sodium extended (DILANTIN) 200 MG capsule   No No   Sig: TAKE 1 CAPSULE BY MOUTH TWICE DAILY   polyethylene glycol (MIRALAX) 17 GM/SCOOP powder   No No   Sig: MIX 17GM OF POWDER IN 8OZ OF WATER UNTIL COMPLETELY DISSOLVED. DRINK SOLUTION BY MOUTH IN THE MORNING   pregabalin (LYRICA) 150 MG capsule   No No   Sig: TAKE 1 CAPSULE BY MOUTH THREE TIMES DAILY   risperiDONE (RISPERDAL) 0.5 MG tablet   No No   Sig: TAKE 1 TABLET BY MOUTH AT BEDTIME   sennosides (SENOKOT) 8.6 MG tablet   Yes No   Sig: Take 1 tablet by mouth 2 times daily    solifenacin (VESICARE) 10 MG tablet   No No   Sig: TAKE 1 TABLET BY MOUTH EVERY MORNING   traZODone (DESYREL) 150 MG tablet   No No   Sig: TAKE 1 TABLET BY MOUTH AT BEDTIME   vitamin D3 (CHOLECALCIFEROL) 10 MCG (400 UNIT) capsule   Yes No   Sig: Take 2 capsules by mouth daily      Facility-Administered Medications: None     Allergies   Allergies   Allergen Reactions     Bee Venom Anaphylaxis      Penicillins Anaphylaxis     Dilantin [Phenytoin] Other (See Comments)     Generic dilantin only per pt     Iodine Hives     Novocaine [Procaine] Hives     Tositumomab Unknown       Physical Exam   Vital Signs: Temp: 97.7  F (36.5  C) Temp src: Oral BP: 128/83 Pulse: 74   Resp: 16 SpO2: 91 % O2 Device: None (Room air)    Weight: 125 lbs .01 oz    Constitutional: awake, alert, cooperative, no apparent distress, and appears stated age  Eyes: Lids and lashes normal, pupils equal, round and reactive to light, extra ocular muscles intact, sclera clear, conjunctiva normal  ENT: Normocephalic, without obvious abnormality, atraumatic, sinuses nontender on palpation, external ears without lesions, oral pharynx with moist mucous membranes, tonsils without erythema or exudates, gums normal and good dentition.  Hematologic / Lymphatic: no cervical lymphadenopathy  Respiratory: No increased work of breathing, good air exchange, clear to auscultation bilaterally, no crackles or wheezing  Cardiovascular: Normal apical impulse, regular rate and rhythm, normal S1 and S2, no S3 or S4, and no murmur noted  GI: No scars, normal bowel sounds, soft, non-distended, non-tender, no masses palpated, no hepatosplenomegally  Skin: no bruising or bleeding  Musculoskeletal: There is no redness, warmth, or swelling of the joints.  Full range of motion noted.  Motor strength is 5 out of 5 all extremities bilaterally.  Tone is normal.  Neurologic: Awake, alert, oriented to name, place and time.  Cranial nerves II-XII are grossly intact.  Motor is 5 out of 5 bilaterally.  Cerebellar finger to nose, heel to shin intact.  Sensory is intact.  Babinski down going, Romberg negative, and gait is normal.  Neuropsychiatric: General: normal, calm and normal eye contact    Data   Data reviewed today: I reviewed all medications, new labs and imaging results over the last 24 hours.  Recent Labs   Lab 12/26/20  0017 12/23/20  1347   WBC 9.5  --    HGB 12.0 12.7    MCV 90  --     227   INR 1.09  --      --    POTASSIUM 3.9 4.4   CHLORIDE 107  --    CO2 29  --    BUN 11  --    CR 0.57  --    ANIONGAP 3  --    CAROL 8.6  --    GLC 96  --    ALBUMIN 2.9*  --    PROTTOTAL 7.3  --    BILITOTAL 0.2  --    ALKPHOS 254*  --    ALT 78*  --    AST 55*  --    TROPI <0.015  --      Most Recent 3 CBC's:  Recent Labs   Lab Test 12/26/20  0017 12/23/20  1347 11/04/20  1211 10/01/20  1115 06/12/20  0421 06/12/20  0421 06/11/20  0548   WBC 9.5  --   --   --   --  9.7 11.6*   HGB 12.0 12.7 12.7 12.2   < > 11.2* 11.7   MCV 90  --   --   --   --  89 87    227  --  240  --  253 287    < > = values in this interval not displayed.     Most Recent 3 BMP's:  Recent Labs   Lab Test 12/26/20  0017 12/23/20  1347 10/01/20  1115 08/19/20  1433 06/12/20  0421     --   --  134 141   POTASSIUM 3.9 4.4 4.2 4.2 3.8   CHLORIDE 107  --   --  106 108   CO2 29  --   --  24 27   BUN 11  --   --  12 13   CR 0.57  --   --  0.52 0.49*   ANIONGAP 3  --   --  4 6   CAROL 8.6  --   --  8.9 8.5   GLC 96  --  84 80 91     Most Recent 2 LFT's:  Recent Labs   Lab Test 12/26/20  0017 06/11/20  0000   AST 55* 44   ALT 78* 64*   ALKPHOS 254* 262*   BILITOTAL 0.2 0.1*     Most Recent 3 INR's:  Recent Labs   Lab Test 12/26/20 0017 06/11/20  0548 06/11/20  0000   INR 1.09 1.12 1.06     Most Recent 3 Creatinines:  Recent Labs   Lab Test 12/26/20  0017 08/19/20  1433 06/12/20  0421   CR 0.57 0.52 0.49*     Most Recent 3 Hemoglobins:  Recent Labs   Lab Test 12/26/20  0017 12/23/20  1347 11/04/20  1211   HGB 12.0 12.7 12.7     Most Recent Urinalysis:  Recent Labs   Lab Test 12/26/20  0156 11/04/20  1230   COLOR Light Yellow Yellow   APPEARANCE Clear Clear   URINEGLC Negative Negative   URINEBILI Negative Negative   URINEKETONE Negative Negative   SG 1.006 1.015   UBLD Trace* Small*   URINEPH 6.5 7.0   PROTEIN Negative Negative   UROBILINOGEN  --  0.2   NITRITE Positive* Negative   LEUKEST Large* Negative    RBCU 1 2-5*   WBCU 13* 0 - 5     Recent Results (from the past 24 hour(s))   Head CT w/o contrast    Narrative    EXAM: CT HEAD W/O CONTRAST  LOCATION: [facility]  DATE/TIME: 12/26/2020 12:02 AM    INDICATION: Traumatic injury. Dizziness.  COMPARISON: 12/05/2017 head CT  TECHNIQUE: Routine CT Head without IV contrast. Multiplanar reformats. Dose reduction techniques were used.    FINDINGS:  INTRACRANIAL CONTENTS: No intracranial hemorrhage, extraaxial collection, or mass effect.  No CT evidence of acute infarct. Mild presumed chronic small vessel ischemic changes. Chronic lacunar infarct in the body of the right caudate nucleus and adjacent   white matter. Mild generalized volume loss. No hydrocephalus. Dense atherosclerotic calcification of the internal carotid arteries and P4 segments of the vertebral arteries.    VISUALIZED ORBITS/SINUSES/MASTOIDS: Prior right cataract surgery. Visualized portions of the orbits are otherwise unremarkable. No paranasal sinus mucosal disease. No middle ear or mastoid effusion.    BONES/SOFT TISSUES: No acute abnormality.      Impression    IMPRESSION:  1.  No CT evidence for acute intracranial process.  2.  Brain atrophy and presumed chronic microvascular ischemic changes as above.   Cervical spine CT w/o contrast    Narrative    EXAM: CT CERVICAL SPINE W/O CONTRAST  LOCATION: [facility]  DATE/TIME: 12/26/2020 12:03 AM    INDICATION: Traumatic injury. Neck pain.  COMPARISON: Cervical spine MRI dated 07/27/2020  TECHNIQUE: Routine CT Cervical Spine without IV contrast. Multiplanar reformats. Dose reduction techniques were used.    FINDINGS:  VERTEBRA: Normal vertebral body heights and alignment. No fracture or posttraumatic subluxation.     CANAL/FORAMINA: Degenerative changes of the cervical spine, with at least mild canal stenosis at C5-C6. Bilateral neural foraminal narrowing is better evaluated on the prior MRI. Degenerative changes of the dens with  narrowing of the atlantodental   interval.    PARASPINAL: Left carotid stent. Dense atherosclerotic plaque in the right common and internal carotid artery.      Impression    IMPRESSION:  1.  No evidence of an acute displaced fracture of the cervical spine.  2.  Degenerative changes, without high-grade osseous spinal canal stenosis.   CT Thoracic Spine w/o Contrast    Narrative    EXAM: CT THORACIC SPINE W/O CONTRAST  LOCATION: [facility]  DATE/TIME: 12/26/2020 12:02 AM    INDICATION: Traumatic injury. Fall. Back pain.  COMPARISON: Chest CT dated 06/10/2020  TECHNIQUE: Routine CT Thoracic Spine without IV contrast. Multiplanar reformats. Dose reduction techniques were used.     FINDINGS:  VERTEBRA: Stable accentuation of the thoracic spine kyphosis, with right apex curvature of the mid thoracic spine. Vertebral body heights are stable in height. No evidence of an acute fracture. There is a spine stimulator in place, with leads terminating   at the level of the mid T9 vertebral body and at T10-T11.     CANAL/FORAMINA: Multilevel degenerative endplate changes with marginal osteophytes. No high-grade osseous neural foraminal narrowing.    PARASPINAL: No acute extraspinal abnormality. Dependent atelectasis. Scattered atherosclerotic calcification. Coronary artery disease.      Impression    IMPRESSION:  1.  No evidence of an acute osseous abnormality of the thoracic spine.     XR Shoulder Left Port G/E 2 Views    Narrative    EXAM: LEFT SHOULDER 3 VIEWS  LOCATION: [facility]  DATE/TIME: 12/26/2020 12:08 AM    INDICATION: Fall. Left shoulder pain.  COMPARISON: None.      Impression    IMPRESSION:   1. No visualized acute fracture or malalignment of the left shoulder.  2. Mild degenerative changes in the left acromioclavicular joint.    XR Pelvis w Hip Port Left G/E 2 Views    Narrative    EXAM: XR PELVIS AND HIP PORTABLE LEFT 2 VIEWS  LOCATION: [facility]  DATE/TIME: 12/26/2020  12:08 AM    INDICATION: Injury with pain.  COMPARISON: None.      Impression    IMPRESSION: Demineralization. No fracture or dislocation. Iliac stents.   XR Chest Port 1 View    Narrative    EXAM: XR CHEST PORT 1 VW  LOCATION: [facility]  DATE/TIME: 12/26/2020 2:38 AM    INDICATION: Fall with shoulder pain  COMPARISON: 06/10/2020      Impression    IMPRESSION: Heart size is stable. Chronic interstitial prominence may represent underlying scarring or fibrosis. No lobar consolidation, overt failure, or large pleural effusions. No pneumothorax. No acute bony abnormalities.

## 2020-12-26 NOTE — ED TRIAGE NOTES
"Pt biba for a fall tonight. Pt fell out of bed and landed on left hip and shoulder. Pt reports \"I think I hit my head\". Does report neck pain and head pain, c-collar applied.   "

## 2020-12-26 NOTE — CONSULTS
12/26/2020:    Care Management Discharge Note    Discharge Date:  Saturday 12/26/2020     Discharge Disposition:    Return to LTC facility  [facility]  [address] [place] [address]  Ph: [phone]    Discharge Services:  Transportation coordination    Discharge DME:  Wheelchair requested for w/c ride to LTC facility    Discharge Transportation:    [facility] wheelchair ride: [phone]  Ride scheduled for 12:30pm    Private pay costs discussed: Not applicable    PAS Confirmation Code:  N/a- return to LTC facility  Patient/family educated on Medicare website which has current facility and service quality ratings:  N/A    Education Provided on the Discharge Plan:  Yes  Persons Notified of Discharge Plans: Patient's LTC [facility] advised and updated of discharge time, patient  Patient/Family in Agreement with the Plan:  Patient aware    Handoff Referral Completed: Yes  Additional Information:  Discharge orders faxed to LTC by NAOMI Sanchez, LGSW  [facility]  Casual       ED Saturday 0800-1600: pager [phone]  ED Sunday 0800-1600: pager [phone]  General calls and after hours [phone]: pager [phone]

## 2020-12-26 NOTE — PLAN OF CARE
- Diagnostic tests and consults completed and resulted- not met  - No further episodes of syncope and any new arrhythmia addressed with controlled heart rates- met  - Vital signs normal or at patient baseline and orthostatic vitals are normal and patient not lightheaded with standing- met  - Tolerating oral intake to maintain hydration- met  - Safe disposition plan has been identified- met

## 2020-12-26 NOTE — PHARMACY
Pharmacy Consult regarding patient's pain pump:    Called patient s pain management clinic ([facility] [phone]) and spoke with Tereza Johnson to review patient s pain pump details. Patient was last seen in clinic on 11/17 and next appointment is 1/6/21. She is on an intrathecal pain pump with the following medications:    Fentanyl 2,000mcg/mL with a total daily dose of 600mcg/day    Bupivacaine 20mg/mL with a total daily dose of 6mg/day    Morphine 3.7mg/mL with a total daily dose of 1.11mg/day  The patient has the ability to self-administer up to 13 total boluses per day, each with a 1 hour lock out period. Her bolus concentrations are as follows:    Fentanyl 50mcg/bolus    Bupivacaine 0.5mg/bolus    Morphine 0.09mg/bolus    Von Roman, PharmD  PGY-1 Pharmacy Resident

## 2020-12-26 NOTE — DISCHARGE SUMMARY
Discharge Summary    Sonya Foote MRN# [id number]   YOB: 1950 Age: 70 year old     Date of Admission:  12/25/2020  Date of Discharge:  12/26/2020 12:30 PM  Admitting Physician:  Nevin Wagner MD  Discharge Physician:  Nevin Wagner   Discharging Service:  Emergency Department Observation Unit     Primary Provider: Allan Casey          Discharge Diagnosis:     Dizziness    Fall, initial encounter    * No resolved hospital problems. *               Discharge Disposition:   Discharged to home           Condition on Discharge:   Discharge condition: Stable   Code status on discharge: Full Code           Procedures:   Cardiology procedures perfromed:   ECHO             Discharge Medications:     Current Discharge Medication List      START taking these medications    Details   ciprofloxacin (CIPRO) 500 MG tablet Take 1 tablet (500 mg) by mouth 2 times daily for 5 days  Qty: 10 tablet, Refills: 0    Associated Diagnoses: Acute cystitis without hematuria         CONTINUE these medications which have NOT CHANGED    Details   Medication given by intrathecal pump continuous prn Drug # 1: Fentanyl (Sublimaze).    Conc:2000  mcg/mL  Total Dose / 24 hours: 600  mcg  Drug # 2: Bupivacaine (Marcaine) .    Conc:20  mg/mL  Total Dose / 24 hours: 6  mg  Drug # 3: Morphine (Duramorph or Infumorph) .    Conc:3.7  mg/mL  Total Dose / 24 hours: 1.11  mg   Diluent: NS      mupirocin (BACTROBAN) 2 % external ointment Apply 1 each topically daily nares      oxyCODONE (OXY-IR) 5 MG capsule Take 1-2 capsules by mouth every 4 hours as needed for severe pain      ACETAMINOPHEN EXTRA STRENGTH 500 MG tablet TAKE 1 TABLET BY MOUTH EVERY 6 HOURS AS NEEDED FOR PAIN / FEVER  Qty: 90 tablet, Refills: 5    Associated Diagnoses: Primary osteoarthritis of both hands      ADVAIR DISKUS 250-50 MCG/DOSE inhaler INHALE 1 PUFF BY MOUTH TWICE DAILY  Qty: 1 Inhaler, Refills: 5    Associated Diagnoses: Chronic obstructive  "pulmonary disease, unspecified COPD type (H)      albuterol (PROAIR HFA/PROVENTIL HFA/VENTOLIN HFA) 108 (90 Base) MCG/ACT inhaler Inhale 2 puffs into the lungs every 6 hours  Qty: 1 Inhaler, Refills: 5    Comments: Pharmacy may dispense brand covered by insurance (Proair, or proventil or ventolin or generic albuterol inhaler)  Associated Diagnoses: Chronic obstructive pulmonary disease, unspecified COPD type (H)      albuterol (PROVENTIL) (2.5 MG/3ML) 0.083% neb solution Take 1 vial (2.5 mg) by nebulization every 6 hours as needed for shortness of breath / dyspnea or wheezing  Qty: 180 mL, Refills: 3    Associated Diagnoses: Chronic obstructive pulmonary disease, unspecified COPD type (H)      alendronate (FOSAMAX) 70 MG tablet TAKE ONE TABLET BY MOUTH EVERY 7 DAYS (TAKE WITH 8OZ OF WATER 30 MINUTES BEFORE BREAKFAST AND REMAIN UPRIGHT DURING THIS TIME)  Qty: 4 tablet, Refills: 97    Associated Diagnoses: Age-related osteoporosis without current pathological fracture      ASPERCREME LIDOCAINE 4 % Patch APPLY 1 PATCH TOPICALLY TO LOWER BACK ONCE DAILY (ON FOR 12 HOURS, OFF FOR 12 HOURS)  Qty: 30 patch, Refills: 10    Associated Diagnoses: Low back pain, unspecified back pain laterality, unspecified chronicity, unspecified whether sciatica present; Chronic pain syndrome      ASPIRIN LOW DOSE 81 MG chewable tablet CHEW AND SWALLOW ONE TABLET BY MOUTH IN THE MORNING  Qty: 28 tablet, Refills: 10    Comments: \"Please authorize quantity 31 day supply with PRN refills for assisted living patient. Their cycle restarts next Thursday (8/20/20). Thank you!\"  Associated Diagnoses: Diabetes mellitus type 2 without retinopathy (H)      atorvastatin (LIPITOR) 40 MG tablet TAKE 1 TABLET BY MOUTH AT BEDTIME  Qty: 28 tablet, Refills: 10    Comments: \"Please authorize quantity 31 day supply with PRN refills for assisted living patient. Their cycle restarts next Thursday (8/20/20). Thank you!\"  Associated Diagnoses: Hyperlipidemia LDL " goal <100      blood glucose monitoring (ONE TOUCH ULTRA 2) meter device kit Use to test blood sugars 3 times daily or as directed.  Qty: 1 kit, Refills: 0    Associated Diagnoses: Type 2 diabetes, HbA1C goal < 8% (H)      blood glucose monitoring (ONE TOUCH ULTRA) test strip Use to test blood sugars 3 times daily or as directed.  Qty: 3 Box, Refills: 3    Associated Diagnoses: Type 2 diabetes mellitus with diabetic peripheral angiopathy without gangrene, without long-term current use of insulin (H)      blood glucose monitoring (ONE TOUCH ULTRASOFT) lancets Use to test blood sugar 3 times daily or as directed.  Qty: 100 each, Refills: PRN    Associated Diagnoses: Type 2 diabetes mellitus with diabetic peripheral angiopathy without gangrene, without long-term current use of insulin (H)      brimonidine (ALPHAGAN) 0.2 % ophthalmic solution Place 1 drop into the right eye 2 times daily ( 12 hours apart).    Please keep 11-16-20 appt for refills.  Qty: 5 mL, Refills: 4    Associated Diagnoses: Primary open angle glaucoma of both eyes, severe stage      capsaicin-menthol-methyl sal 0.025-1-12 % external cream Apply 1 Application topically daily as needed (topical pain)  Qty: 56.6 g, Refills: 1    Associated Diagnoses: Chronic pain syndrome      diclofenac (VOLTAREN) 1 % topical gel APPLY 2 GRAMS TOPICALLY TO AFFECTED AREA(S) FOUR TIMES A DAY  Qty: 100 g, Refills: 11    Comments: PLEASE AUTHORIZE REFILLS FOR ASSISTED LIVING PT. THANK YOU  Associated Diagnoses: Primary osteoarthritis of both hands      dorzolamide (TRUSOPT) 2 % ophthalmic solution Place 1 drop into the right eye 2 times daily  Qty: 1 Bottle, Refills: 1    Associated Diagnoses: Primary open angle glaucoma of both eyes, severe stage      EPINEPHrine (ANY BX GENERIC EQUIV) 0.3 MG/0.3ML injection 2-pack INJECT 0.3 ML INTO THE MUSCLE AS NEEDED FOR ANAPHYLAXIS  Qty: 2 each, Refills: 1    Associated Diagnoses: Anaphylaxis, sequela      escitalopram (LEXAPRO) 10  "MG tablet TAKE 1 TABLET BY MOUTH ONCE DAILY  Qty: 28 tablet, Refills: 10    Comments: \"Please authorize quantity 31 day supply with PRN refills for assisted living patient. Their cycle restarts next Thursday (8/20/20). Thank you!\"  Associated Diagnoses: Schizoaffective disorder, unspecified type (H)      FEROSUL 325 (65 Fe) MG tablet TAKE 1 TABLET BY MOUTH TWICE DAILY WITH MEALS  Qty: 56 tablet, Refills: 10    Comments: \"Please authorize quantity 62 day supply with PRN refills for assisted living patient. Their cycle restarts next Thursday (8/20/20). Thank you!\"  Associated Diagnoses: Schizoaffective disorder, unspecified type (H); Diabetes mellitus type 2 without retinopathy (H)      fluticasone (FLONASE) 50 MCG/ACT nasal spray SPRAY 2 SPRAYS IN EACH NOSTRIL DAILY  Qty: 16 g, Refills: 11    Associated Diagnoses: Allergic rhinitis, unspecified seasonality, unspecified trigger      INCRUSE ELLIPTA 62.5 MCG/INH Inhaler INHALE 1 PUFF BY MOUTH ONCE DAILY  Qty: 30 each, Refills: 10    Associated Diagnoses: Chronic obstructive pulmonary disease, unspecified COPD type (H)      lactulose (CHRONULAC) 10 GM/15ML solution TAKE 30ML (20MG) BY MOUTH AS NEEDED FOR CONSTIPATION  Qty: 473 mL, Refills: 1    Associated Diagnoses: Constipation, unspecified constipation type      latanoprost (XALATAN) 0.005 % ophthalmic solution INSTILL ONE DROP IN EACH EYE AT BEDTIME  Qty: 2.5 mL, Refills: 10    Comments: PLEASE AUTHORIZE FOR AN ASSISTED LIVING RESIDENT. THANK YOU  Associated Diagnoses: Primary open angle glaucoma of both eyes, severe stage      levETIRAcetam (KEPPRA) 500 MG tablet TAKE 1 TABLET BY MOUTH TWICE DAILY  Qty: 56 tablet, Refills: 11    Comments: \"Please authorize quantity 62 day supply with PRN refills for assisted living patient. Their cycle restarts next Thursday (8/20/20). Thank you!\"  Associated Diagnoses: Nausea      lisinopril (ZESTRIL) 20 MG tablet TAKE 1 TABLET BY MOUTH EVERY MORNING  Qty: 28 tablet, Refills: 11    " "Comments: \"Please authorize quantity 31 day supply with PRN refills for assisted living patient. Their cycle restarts next Thursday (8/20/20). Thank you!\"  Associated Diagnoses: History of coronary artery disease      loratadine (CLARITIN) 10 MG tablet TAKE 1 TABLET BY MOUTH ONCE DAILY  Qty: 28 tablet, Refills: 11    Comments: \"Please authorize quantity 31 day supply with PRN refills for assisted living patient. Their cycle restarts next Thursday (8/20/20). Thank you!\"  Associated Diagnoses: Allergic rhinitis, unspecified seasonality, unspecified trigger      Menthol, Topical Analgesic, 5 % GEL Externally apply topically daily as needed  Qty: 113 g, Refills: 0    Associated Diagnoses: Chronic pain syndrome      metFORMIN (GLUCOPHAGE) 500 MG tablet TAKE 1 TABLET BY MOUTH EVERY EVENING WITH DINNER  Qty: 28 tablet, Refills: 11    Comments: \"Please authorize quantity 31 day supply with PRN refills for assisted living patient. Their cycle restarts next Thursday (8/20/20). Thank you!\"  Associated Diagnoses: Diabetes mellitus type 2 without retinopathy (H)      metoprolol succinate ER (TOPROL-XL) 50 MG 24 hr tablet TAKE 1 TABLET BY MOUTH EVERY MORNING  Qty: 30 tablet, Refills: 10    Comments: \"Please authorize quantity 31 day supply with PRN refills for assisted living patient. Their cycle restarts next Thursday (8/20/20). Thank you!\"  Associated Diagnoses: History of coronary artery disease      Multiple Vitamins-Minerals (TAB-A-MACY) TABS TAKE 1 TABLET BY MOUTH ONCE DAILY  Qty: 28 tablet, Refills: 11    Comments: \"Please authorize quantity 31 day supply with PRN refills for assisted living patient. Their cycle restarts next Thursday (8/20/20). Thank you!\"  Associated Diagnoses: Schizoaffective disorder, unspecified type (H)      multivitamin, therapeutic (THERA-VIT) TABS tablet Take 1 tablet by mouth daily  Qty:      Associated Diagnoses: Pressure injury of right lower back, stage 2 (H)      nicotine (NICODERM CQ) 14 " "MG/24HR 24 hr patch APPLY 1 PATCH TOPICALLY DAILY ( EVERY 24 HOURS )  Qty: 28 patch, Refills: 3    Associated Diagnoses: Current tobacco use      nitroGLYcerin (NITROSTAT) 0.4 MG sublingual tablet DISSOLVE ONE TABLET UNDER TONGUE AS NEEDED FOR CHEST PAIN MAY REPEAT EVERY 5 MINUTES FOR 3 DOSES, IF SYMPTOMS PERSIST CALL 911  Qty: 25 tablet, Refills: 3    Associated Diagnoses: Coronary artery disease involving native coronary artery of native heart without angina pectoris      Nutritional Supplements (ENSURE) LIQD DRINK ONE CAN / BOTTLE BY MOUTH TWICE DAILY WITH MEALS  Qty: 49034 mL, Refills: 11    Comments: PLEASE AUTHORIZE REFILLS FOR ASSISTED LIVING PATIENT. THANK YOU  Associated Diagnoses: Constipation, unspecified constipation type      pantoprazole (PROTONIX) 40 MG EC tablet TAKE 1 TABLET BY MOUTH EVERY MORNING  Qty: 28 tablet, Refills: 11    Comments: \"Please authorize quantity 31 day supply with PRN refills for assisted living patient. Their cycle restarts next Thursday (8/20/20). Thank you!\"  Associated Diagnoses: Gastroesophageal reflux disease without esophagitis      phenytoin (DILANTIN) 100 MG capsule TAKE 2 CAPS (200MG) BY MOUTH TWICE A DAY  Qty: 120 capsule, Refills: 11    Associated Diagnoses: Seizure disorder (H)      polyethylene glycol (MIRALAX) 17 GM/SCOOP powder MIX 17GM OF POWDER IN 8OZ OF WATER UNTIL COMPLETELY DISSOLVED. DRINK SOLUTION BY MOUTH IN THE MORNING  Qty: 510 g, Refills: 8    Associated Diagnoses: Age-related osteoporosis without current pathological fracture      pregabalin (LYRICA) 150 MG capsule TAKE 1 CAPSULE BY MOUTH THREE TIMES DAILY  Qty: 90 capsule, Refills: 4    Associated Diagnoses: Chronic pain syndrome      risperiDONE (RISPERDAL) 0.5 MG tablet TAKE 1 TABLET BY MOUTH AT BEDTIME  Qty: 28 tablet, Refills: 4    Comments: \"Please authorize quantity 31 day supply with PRN refills for assisted living patient. Their cycle restarts next Thursday (8/20/20). Thank you!\"  Associated " "Diagnoses: Schizoaffective disorder, unspecified type (H)      SENEXON-S 8.6-50 MG tablet TAKE 1 TABLET BY MOUTH TWICE DAILY  Qty: 56 tablet, Refills: 10    Comments: \"Please authorize quantity 62 day supply with PRN refills for assisted living patient. Their cycle restarts next Thursday (8/20/20). Thank you!\"  Associated Diagnoses: Pressure injury of right lower back, stage 2 (H)      solifenacin (VESICARE) 10 MG tablet TAKE 1 TABLET BY MOUTH EVERY MORNING  Qty: 28 tablet, Refills: 10    Comments: \"Please authorize quantity 31 day supply with PRN refills for assisted living patient. Their cycle restarts next Thursday (8/20/20). Thank you!\"  Associated Diagnoses: Urinary frequency      traZODone (DESYREL) 150 MG tablet TAKE 1 TABLET BY MOUTH AT BEDTIME  Qty: 28 tablet, Refills: 10    Comments: \"Please authorize quantity 31 day supply with PRN refills for assisted living patient. Their cycle restarts next Thursday (8/20/20). Thank you!\"  Associated Diagnoses: Schizoaffective disorder, unspecified type (H)      vitamin D3 (CHOLECALCIFEROL) 10 MCG (400 UNIT) capsule Take 2 capsules by mouth daily      Vitamin D3 (VITAMIN D) 10 MCG (400 UNIT) tablet TAKE TWO TABLETS (800 UNITS) BY MOUTH ONCE DAILY  Qty: 56 tablet, Refills: 11    Comments: \"Please authorize quantity 62 day supply with PRN refills for assisted living patient. Their cycle restarts next Thursday (8/20/20). Thank you!\"  Associated Diagnoses: Schizoaffective disorder, unspecified type (H)         STOP taking these medications       nicotine (COMMIT) 2 MG lozenge Comments:   Reason for Stopping:                     Consultations:   No consultations were requested during this admission             Brief History of Illness:   Sonya Foote is a 70 year old female admitted on 12/25/2020. She has a PMH of seizure disorder, hx of CAD w/ inferior MI in 2016 s/p PCI, CVA, HTN, DM2, GERD with previous strictures and dilation, COPD, chronic dyspnea, JOSE, ROGER, MDD, DJD, " fibromyalgia, chronic pain syndrome, and neurogenic bladder, bilateral LE amputations, chronic neuropathy, chronic neck pain (cervical 3, 4, 5 medial branch blocks on the left neck on 10/30/20), epilepsy (petit mal almost daily; last grand mal seizure 10-15 years ago) who presents to the ED via EMS from congregate care facility after a fall, reporting head, neck, upper back, left shoulder and left hip pain.           Hospital Course:   #. Fall  #. Syncope  Patient reports feeling thirsty, turned in bed to try to get a glass of water, felt somewhat dizzy, and then fell out of the bed. She recalls prior lightheadedness, dark vision, palpitations. Admits to head trauma and LOC. Though in the ED denied LOC; however currently states she had a period of being unaware of what was going on. Landed on the ground between the nightstand and a nearby heater.  In doing so she reports hitting the left side of her head on the ground as well as her left hip and shoulder which took the brunt of this fall. Denies loss of bowel and bladder control; no tongue biting. Neck pain worse than baseline. Patient brought in by EMS (C-collar applied upon arrival to ED), with concern of multiple pain complaints after a fall.  Chronic history of neuropathy; pain is mostly over the lateral portion of the shoulder over the deltoid area. Upper back discomfort. Left hip sore throughout the lateral portion of the hip  vertiginous symptoms.  There is no current dizziness, there are no other preceding symptoms.  Denies chest pain, nausea or vomiting, abdominal symptoms, new urinary symptoms, vision or hearing changes or speech changes. In ED, VSS. CMP with albumin 2.9, , ALT 78, AST 55 otherwise normal. Normal CBC. Lactic acid 1.0. Troponin I negative. INR 1.09. UA positive or nitrite, trace blood, large LE, 13 WBC, 1 RBC, few bacteria, few amorphous crystals. UCx pending (previous cultures with pseudomonas - ? Colonization). EKG NSR. Covid19  negative. CT Head negative. CT C Spine and T Spine negative. Left houlder xray, pelvis and hip xray, and CXR all negative. In the ED the patient was given fentanyl 12.5mcg IV x 1. Patient was evaluated by Trauma and NSG and awaiting final recommendations. In the mean time, they recommended aspen collar could be taken off. She is being admitted for a syncope work up and PT/OT evaluation. Ddx: arrhythmia, ACS (less likely), seizure, syncope. Underwent a resting echocardiogram, this was normal. Serial troponin negative. No ectopy on telemetry. Patient feeling better this morning. Discussed UA findings with attending, agrees with antibiotic treatment. Patient started on Ciprofloxacin 500 mg BID x 7 days. UC still pending. She is afebrile. She is able to tolerate po. Patient discharged home in HD stable condition.         #. DM2: On Metformin per Med Rec from facility. Last A1c was 5 on 8/19/20   - Continue PTA Metformin     #. Hx of Seizures: Pt reports grand mal 15 years ago, petit mal seizures on a daily basis which usually is just being absent briefly  - Continue PTA Keppra, PTA Dilantin  - Check Keppra and Dilantin levels     #. Fibromyalgia  #. DJD  #. Chronic Pain: Patient has a pain pump that delivers Fentanyl and ? Morphine. Follow at [facility] and states she has it refilled every 6 weeks. Unable to find records of pump details  - Continue PTA oxycodone 5-10mg q6h, lidocaine patch, capsaicin cream, pregabalin     --- CHRONIC ISSUES ---  #. Benign essential HTN  #. CAD  #. History of CVA  Reported residual left sided weakness  - Continue PTA lisinopril and metoprolol     #. Mild JOSE: - Continue PTA CPAP     #. COPD: - Hold Incruse and Advair  - DuoNeb 4x daily while she is here.  - Pulmicort neb bid while here     #. Schizophrenia: - Continue PTA risperdal and lexapro     #. Neurogenic bladder - Chronic suprapubic catheter in place, s/p botox injections      #. Osteoporosis - pta alendronate weekly, order  "once day of the week confirmed    #. Seasonal allergies, chronic sinusitis: - Continue PTA Loratadine. Hold Flonas     #. Anemia: Baseline 12-13. H/H 12/35.9  - Continue with PTA Ferrous sulfate      #. PVD   #. Bilateral Above Knee Amputation: Nov 2016 2/2 DM and PVD. Pt uses electronic wheelchair and lives in [facility] in [place]. Care coordinator consult placed for dispo planning.      #. B/L AKA: Utilizes prosthesis, did not bring them with her     #. Blind in left eye: Also has limited poor vision in right eye.     #. Tobacco Abuse: 75 pack year history.   - Nicotine patch     #. GERD: - Continue PTA Protonix     #. Incontinence: Suprapubic catheter in place. History of UTIs with this. Continue PTA Detrol LA. Urine culture pending.                   Final Day of Progress before Discharge:       Physical Exam:  Blood pressure 107/65, pulse 69, temperature 98.4  F (36.9  C), temperature source Oral, resp. rate 16, height 1.092 m (3' 7\"), weight 56.7 kg (125 lb), SpO2 99 %, not currently breastfeeding.    EXAM:  Physical Exam   Constitutional: Pt is oriented to person, place, and time.Pt appears well-developed and well-nourished.   HENT:   Head: Normocephalic and atraumatic.   Eyes: Conjunctivae are normal. Pupils are equal, round, and reactive to light.   Neck: Normal range of motion. Neck supple.   Cardiovascular: Normal rate, regular rhythm, normal heart sounds and intact distal pulses.    Pulmonary/Chest: Effort normal and breath sounds normal. No respiratory distress. Pt has no wheezes. Pt has no rales  Abdominal: Soft. Bowel sounds are normal. Pt exhibits no distension and no mass. No tenderness. Pt has no rebound and no guarding.   Musculoskeletal: Normal range of motion. Pt exhibits no edema.   Neurological: Pt is alert and oriented to person, place, and time. Normal reflexes.   Skin: Skin is warm and dry. No rash noted.   Psychiatric: Pt has a normal mood and affect. Behavior is " normal. Judgment and thought content normal.             Data:  All laboratory data reviewed             Significant Results:   None  Results for orders placed or performed during the hospital encounter of 12/25/20   Head CT w/o contrast     Status: None    Narrative    EXAM: CT HEAD W/O CONTRAST  LOCATION: [facility]  DATE/TIME: 12/26/2020 12:02 AM    INDICATION: Traumatic injury. Dizziness.  COMPARISON: 12/05/2017 head CT  TECHNIQUE: Routine CT Head without IV contrast. Multiplanar reformats. Dose reduction techniques were used.    FINDINGS:  INTRACRANIAL CONTENTS: No intracranial hemorrhage, extraaxial collection, or mass effect.  No CT evidence of acute infarct. Mild presumed chronic small vessel ischemic changes. Chronic lacunar infarct in the body of the right caudate nucleus and adjacent   white matter. Mild generalized volume loss. No hydrocephalus. Dense atherosclerotic calcification of the internal carotid arteries and P4 segments of the vertebral arteries.    VISUALIZED ORBITS/SINUSES/MASTOIDS: Prior right cataract surgery. Visualized portions of the orbits are otherwise unremarkable. No paranasal sinus mucosal disease. No middle ear or mastoid effusion.    BONES/SOFT TISSUES: No acute abnormality.      Impression    IMPRESSION:  1.  No CT evidence for acute intracranial process.  2.  Brain atrophy and presumed chronic microvascular ischemic changes as above.   Cervical spine CT w/o contrast     Status: None    Narrative    EXAM: CT CERVICAL SPINE W/O CONTRAST  LOCATION: [facility]  DATE/TIME: 12/26/2020 12:03 AM    INDICATION: Traumatic injury. Neck pain.  COMPARISON: Cervical spine MRI dated 07/27/2020  TECHNIQUE: Routine CT Cervical Spine without IV contrast. Multiplanar reformats. Dose reduction techniques were used.    FINDINGS:  VERTEBRA: Normal vertebral body heights and alignment. No fracture or posttraumatic subluxation.     CANAL/FORAMINA: Degenerative changes of the  cervical spine, with at least mild canal stenosis at C5-C6. Bilateral neural foraminal narrowing is better evaluated on the prior MRI. Degenerative changes of the dens with narrowing of the atlantodental   interval.    PARASPINAL: Left carotid stent. Dense atherosclerotic plaque in the right common and internal carotid artery.      Impression    IMPRESSION:  1.  No evidence of an acute displaced fracture of the cervical spine.  2.  Degenerative changes, without high-grade osseous spinal canal stenosis.   CT Thoracic Spine w/o Contrast     Status: None    Narrative    EXAM: CT THORACIC SPINE W/O CONTRAST  LOCATION: [facility]  DATE/TIME: 12/26/2020 12:02 AM    INDICATION: Traumatic injury. Fall. Back pain.  COMPARISON: Chest CT dated 06/10/2020  TECHNIQUE: Routine CT Thoracic Spine without IV contrast. Multiplanar reformats. Dose reduction techniques were used.     FINDINGS:  VERTEBRA: Stable accentuation of the thoracic spine kyphosis, with right apex curvature of the mid thoracic spine. Vertebral body heights are stable in height. No evidence of an acute fracture. There is a spine stimulator in place, with leads terminating   at the level of the mid T9 vertebral body and at T10-T11.     CANAL/FORAMINA: Multilevel degenerative endplate changes with marginal osteophytes. No high-grade osseous neural foraminal narrowing.    PARASPINAL: No acute extraspinal abnormality. Dependent atelectasis. Scattered atherosclerotic calcification. Coronary artery disease.      Impression    IMPRESSION:  1.  No evidence of an acute osseous abnormality of the thoracic spine.     XR Shoulder Left Port G/E 2 Views     Status: None    Narrative    EXAM: LEFT SHOULDER 3 VIEWS  LOCATION: [facility]  DATE/TIME: 12/26/2020 12:08 AM    INDICATION: Fall. Left shoulder pain.  COMPARISON: None.      Impression    IMPRESSION:   1. No visualized acute fracture or malalignment of the left shoulder.  2. Mild degenerative  changes in the left acromioclavicular joint.    XR Pelvis w Hip Port Left G/E 2 Views     Status: None    Narrative    EXAM: XR PELVIS AND HIP PORTABLE LEFT 2 VIEWS  LOCATION: [facility]  DATE/TIME: 12/26/2020 12:08 AM    INDICATION: Injury with pain.  COMPARISON: None.      Impression    IMPRESSION: Demineralization. No fracture or dislocation. Iliac stents.   XR Chest Port 1 View     Status: None    Narrative    EXAM: XR CHEST PORT 1 VW  LOCATION: [facility]  DATE/TIME: 12/26/2020 2:38 AM    INDICATION: Fall with shoulder pain  COMPARISON: 06/10/2020      Impression    IMPRESSION: Heart size is stable. Chronic interstitial prominence may represent underlying scarring or fibrosis. No lobar consolidation, overt failure, or large pleural effusions. No pneumothorax. No acute bony abnormalities.   CBC with platelets differential     Status: None   Result Value Ref Range    WBC 9.5 4.0 - 11.0 10e9/L    RBC Count 4.00 3.8 - 5.2 10e12/L    Hemoglobin 12.0 11.7 - 15.7 g/dL    Hematocrit 35.9 35.0 - 47.0 %    MCV 90 78 - 100 fl    MCH 30.0 26.5 - 33.0 pg    MCHC 33.4 31.5 - 36.5 g/dL    RDW 13.2 10.0 - 15.0 %    Platelet Count 214 150 - 450 10e9/L    Diff Method Automated Method     % Neutrophils 63.5 %    % Lymphocytes 19.4 %    % Monocytes 11.7 %    % Eosinophils 4.7 %    % Basophils 0.4 %    % Immature Granulocytes 0.3 %    Nucleated RBCs 0 0 /100    Absolute Neutrophil 6.0 1.6 - 8.3 10e9/L    Absolute Lymphocytes 1.9 0.8 - 5.3 10e9/L    Absolute Monocytes 1.1 0.0 - 1.3 10e9/L    Absolute Eosinophils 0.5 0.0 - 0.7 10e9/L    Absolute Basophils 0.0 0.0 - 0.2 10e9/L    Abs Immature Granulocytes 0.0 0 - 0.4 10e9/L    Absolute Nucleated RBC 0.0    INR     Status: None   Result Value Ref Range    INR 1.09 0.86 - 1.14   Comprehensive metabolic panel     Status: Abnormal   Result Value Ref Range    Sodium 139 133 - 144 mmol/L    Potassium 3.9 3.4 - 5.3 mmol/L    Chloride 107 94 - 109 mmol/L    Carbon  Dioxide 29 20 - 32 mmol/L    Anion Gap 3 3 - 14 mmol/L    Glucose 96 70 - 99 mg/dL    Urea Nitrogen 11 7 - 30 mg/dL    Creatinine 0.57 0.52 - 1.04 mg/dL    GFR Estimate >90 >60 mL/min/[1.73_m2]    GFR Estimate If Black >90 >60 mL/min/[1.73_m2]    Calcium 8.6 8.5 - 10.1 mg/dL    Bilirubin Total 0.2 0.2 - 1.3 mg/dL    Albumin 2.9 (L) 3.4 - 5.0 g/dL    Protein Total 7.3 6.8 - 8.8 g/dL    Alkaline Phosphatase 254 (H) 40 - 150 U/L    ALT 78 (H) 0 - 50 U/L    AST 55 (H) 0 - 45 U/L   Lactic acid whole blood     Status: None   Result Value Ref Range    Lactic Acid 1.0 0.7 - 2.0 mmol/L   Troponin I     Status: None   Result Value Ref Range    Troponin I ES <0.015 0.000 - 0.045 ug/L   UA with Microscopic     Status: Abnormal   Result Value Ref Range    Color Urine Light Yellow     Appearance Urine Clear     Glucose Urine Negative NEG^Negative mg/dL    Bilirubin Urine Negative NEG^Negative    Ketones Urine Negative NEG^Negative mg/dL    Specific Gravity Urine 1.006 1.003 - 1.035    Blood Urine Trace (A) NEG^Negative    pH Urine 6.5 5.0 - 7.0 pH    Protein Albumin Urine Negative NEG^Negative mg/dL    Urobilinogen mg/dL Normal 0.0 - 2.0 mg/dL    Nitrite Urine Positive (A) NEG^Negative    Leukocyte Esterase Urine Large (A) NEG^Negative    Source Catheterized Urine     WBC Urine 13 (H) 0 - 5 /HPF    RBC Urine 1 0 - 2 /HPF    Bacteria Urine Few (A) NEG^Negative /HPF    Amorphous Crystals Few (A) NEG^Negative /HPF   Asymptomatic SARS-CoV-2 COVID-19 Virus (Coronavirus) by PCR     Status: None    Specimen: Nasopharyngeal   Result Value Ref Range    SARS-CoV-2 Virus Specimen Source Nasopharyngeal     SARS-CoV-2 PCR Result NEGATIVE     SARS-CoV-2 PCR Comment       Testing was performed using the Xpert Xpress SARS-CoV-2 Assay on the Cepheid Gene-Xpert   Instrument Systems. Additional information about this Emergency Use Authorization (EUA)   assay can be found via the Lab Guide.     Glucose by meter     Status: Abnormal   Result Value  Ref Range    Glucose 106 (H) 70 - 99 mg/dL   Troponin I - Now then in 4 hours x 2     Status: None   Result Value Ref Range    Troponin I ES <0.015 0.000 - 0.045 ug/L   Hemoglobin A1c     Status: None   Result Value Ref Range    Hemoglobin A1C 5.1 0 - 5.6 %   Magnesium     Status: None   Result Value Ref Range    Magnesium 2.1 1.6 - 2.3 mg/dL   Phosphorus     Status: None   Result Value Ref Range    Phosphorus 3.9 2.5 - 4.5 mg/dL   TSH with free T4 reflex     Status: None   Result Value Ref Range    TSH 0.95 0.40 - 4.00 mU/L   Phenytoin level     Status: Abnormal   Result Value Ref Range    Phenytoin Level 25.0 (H) 10 - 20 mg/L   Troponin I     Status: None   Result Value Ref Range    Troponin I ES <0.015 0.000 - 0.045 ug/L   Glucose by meter     Status: Abnormal   Result Value Ref Range    Glucose 120 (H) 70 - 99 mg/dL   Glucose by meter     Status: None   Result Value Ref Range    Glucose 85 70 - 99 mg/dL   EKG 12-lead, tracing only     Status: None   Result Value Ref Range    Interpretation ECG Click View Image link to view waveform and result    Care Management / Social Work IP Consult     Status: None ()    Pat Harding MSW     12/26/2020 12:30 PM  12/26/2020:    Care Management Discharge Note    Discharge Date:  Saturday 12/26/2020     Discharge Disposition:    Return to LTC facility  [facility] [place] [address] [place] [address]  Ph: [phone]    Discharge Services:  Transportation coordination    Discharge DME:  Wheelchair requested for w/c ride to LTC facility    Discharge Transportation:    [facility]  wheelchair ride: [phone]  Ride scheduled for 12:30pm    Private pay costs discussed: Not applicable    PAS Confirmation Code:  N/a- return to LTC facility  Patient/family educated on Medicare website which has current   facility and service quality ratings:  N/A    Education Provided on the Discharge Plan:  Yes  Persons Notified of Discharge Plans:  [facility]   [place] advised and updated of discharge time, patient  Patient/Family in Agreement with the Plan:  Patient aware    Handoff Referral Completed: Yes  Additional Information:  Discharge orders faxed to LT by NAOMI Sanchez, SHIRIN  [facility]  Casual       ED -1600: pager [phone]  ED : pager [phone]  General calls and after hours [id number]: pager [phone]                 Echocardiogram Complete     Status: None    Narrative    [id number]  [id number]  [id number]  [id number]^QUOC^NA^DEVYN           [facility],[place]  Echocardiography Laboratory  [address] [place] [id number]     Name: SHARATH MERCEDES  MRN: [id number]  : 1950  Study Date: 2020 10:14 AM  Age: 70 yrs  Gender: Female  Patient Location: [facility]  Reason For Study: Syncope  Ordering Physician: NA KUMARI  Performed By: Rhiannon Arriaga RDCS     BSA: 1.2 m2  Height: 43 in  Weight: 125 lb  HR: 61  BP: 137/68 mmHg  _____________________________________________________________________________  __        Procedure  Complete Portable Echo Adult. Echocardiogram with two-dimensional, color and  spectral Doppler performed.  _____________________________________________________________________________  __        Interpretation Summary  Global and regional left ventricular function is normal with an EF of 60-65%.  Right ventricular function, chamber size, wall motion, and thickness are  normal.  No significant valvular abnormalities were noted.  No pericardial effusion is present.     This study was compared with the study from 2020. No significant changes  noted.  _____________________________________________________________________________  __        Left Ventricle  Global and regional left ventricular function is normal with an EF of 60-65%.  Left ventricular size is normal. Left ventricular wall  thickness is normal.  Left ventricular diastolic function is normal. No regional wall motion  abnormalities are seen.     Right Ventricle  Right ventricular function, chamber size, wall motion, and thickness are  normal.     Atria  Both atria appear normal.     Mitral Valve  The mitral valve is normal. Trace mitral insufficiency is present.        Aortic Valve  Aortic valve is normal in structure and function. The aortic valve is  tricuspid.     Tricuspid Valve  The tricuspid valve is normal. Trace tricuspid insufficiency is present.  Pulmonary artery systolic pressure cannot be assessed.     Pulmonic Valve  On Doppler interrogation, there is no significant stenosis or regurgitation.  The valve leaflets are not well visualized.     Vessels  The aorta root is normal. The thoracic aorta is normal. The pulmonary artery  cannot be assessed. The inferior vena cava was normal in size with preserved  respiratory variability. IVC diameter <2.1 cm collapsing >50% with sniff  suggests a normal RA pressure of 3 mmHg.     Pericardium  No pericardial effusion is present.        Compared to Previous Study  This study was compared with the study from 2020 . No significant changes  noted.  _____________________________________________________________________________  __  MMode/2D Measurements & Calculations     Ao root diam: 3.0 cm  asc Aorta Diam: 2.7 cm  LVOT diam: 2.0 cm  LVOT area: 3.1 cm2        Doppler Measurements & Calculations  MV E max leander: 50.4 cm/sec  MV A max leander: 52.9 cm/sec  MV E/A: 0.95  MV dec slope: 196.0 cm/sec2  PA acc time: 0.10 sec  E/E' av.5  Lateral E/e': 3.9  Medial E/e': 9.1        _____________________________________________________________________________  __        Report approved by: MD Mark Khanna 2020 11:38 AM      Urine Culture     Status: None (Preliminary result)    Specimen: Catheterized Urine   Result Value Ref Range    Specimen Description Catheterized  Urine     Special Requests Specimen received in preservative     Culture Micro PENDING       Recent Results (from the past 48 hour(s))   Head CT w/o contrast    Narrative    EXAM: CT HEAD W/O CONTRAST  LOCATION: [facility]  DATE/TIME: 12/26/2020 12:02 AM    INDICATION: Traumatic injury. Dizziness.  COMPARISON: 12/05/2017 head CT  TECHNIQUE: Routine CT Head without IV contrast. Multiplanar reformats. Dose reduction techniques were used.    FINDINGS:  INTRACRANIAL CONTENTS: No intracranial hemorrhage, extraaxial collection, or mass effect.  No CT evidence of acute infarct. Mild presumed chronic small vessel ischemic changes. Chronic lacunar infarct in the body of the right caudate nucleus and adjacent   white matter. Mild generalized volume loss. No hydrocephalus. Dense atherosclerotic calcification of the internal carotid arteries and P4 segments of the vertebral arteries.    VISUALIZED ORBITS/SINUSES/MASTOIDS: Prior right cataract surgery. Visualized portions of the orbits are otherwise unremarkable. No paranasal sinus mucosal disease. No middle ear or mastoid effusion.    BONES/SOFT TISSUES: No acute abnormality.      Impression    IMPRESSION:  1.  No CT evidence for acute intracranial process.  2.  Brain atrophy and presumed chronic microvascular ischemic changes as above.   Cervical spine CT w/o contrast    Narrative    EXAM: CT CERVICAL SPINE W/O CONTRAST  LOCATION: [facility]  DATE/TIME: 12/26/2020 12:03 AM    INDICATION: Traumatic injury. Neck pain.  COMPARISON: Cervical spine MRI dated 07/27/2020  TECHNIQUE: Routine CT Cervical Spine without IV contrast. Multiplanar reformats. Dose reduction techniques were used.    FINDINGS:  VERTEBRA: Normal vertebral body heights and alignment. No fracture or posttraumatic subluxation.     CANAL/FORAMINA: Degenerative changes of the cervical spine, with at least mild canal stenosis at C5-C6. Bilateral neural foraminal narrowing is better  evaluated on the prior MRI. Degenerative changes of the dens with narrowing of the atlantodental   interval.    PARASPINAL: Left carotid stent. Dense atherosclerotic plaque in the right common and internal carotid artery.      Impression    IMPRESSION:  1.  No evidence of an acute displaced fracture of the cervical spine.  2.  Degenerative changes, without high-grade osseous spinal canal stenosis.   CT Thoracic Spine w/o Contrast    Narrative    EXAM: CT THORACIC SPINE W/O CONTRAST  LOCATION: [facility]  DATE/TIME: 12/26/2020 12:02 AM    INDICATION: Traumatic injury. Fall. Back pain.  COMPARISON: Chest CT dated 06/10/2020  TECHNIQUE: Routine CT Thoracic Spine without IV contrast. Multiplanar reformats. Dose reduction techniques were used.     FINDINGS:  VERTEBRA: Stable accentuation of the thoracic spine kyphosis, with right apex curvature of the mid thoracic spine. Vertebral body heights are stable in height. No evidence of an acute fracture. There is a spine stimulator in place, with leads terminating   at the level of the mid T9 vertebral body and at T10-T11.     CANAL/FORAMINA: Multilevel degenerative endplate changes with marginal osteophytes. No high-grade osseous neural foraminal narrowing.    PARASPINAL: No acute extraspinal abnormality. Dependent atelectasis. Scattered atherosclerotic calcification. Coronary artery disease.      Impression    IMPRESSION:  1.  No evidence of an acute osseous abnormality of the thoracic spine.     XR Shoulder Left Port G/E 2 Views    Narrative    EXAM: LEFT SHOULDER 3 VIEWS  LOCATION: [facility]  DATE/TIME: 12/26/2020 12:08 AM    INDICATION: Fall. Left shoulder pain.  COMPARISON: None.      Impression    IMPRESSION:   1. No visualized acute fracture or malalignment of the left shoulder.  2. Mild degenerative changes in the left acromioclavicular joint.    XR Pelvis w Hip Port Left G/E 2 Views    Narrative    EXAM: XR PELVIS AND HIP PORTABLE LEFT 2  VIEWS  LOCATION: [facility]  DATE/TIME: 2020 12:08 AM    INDICATION: Injury with pain.  COMPARISON: None.      Impression    IMPRESSION: Demineralization. No fracture or dislocation. Iliac stents.   XR Chest Port 1 View    Narrative    EXAM: XR CHEST PORT 1 VW  LOCATION: [facility]  DATE/TIME: 2020 2:38 AM    INDICATION: Fall with shoulder pain  COMPARISON: 06/10/2020      Impression    IMPRESSION: Heart size is stable. Chronic interstitial prominence may represent underlying scarring or fibrosis. No lobar consolidation, overt failure, or large pleural effusions. No pneumothorax. No acute bony abnormalities.   Echocardiogram Complete    Narrative    [id number]  [id number]  [id number]  [id number]^QUOC^NA^DEVYN           [facility],[place]  Echocardiography Laboratory  [address] [place] [address]     Name: SHARATH MERCEDES  MRN: [id number]  : 1950  Study Date: 2020 10:14 AM  Age: 70 yrs  Gender: Female  Patient Location: [facility]  Reason For Study: Syncope  Ordering Physician: NA KUMARI  Performed By: Rhiannon Arriaga RDCS     BSA: 1.2 m2  Height: 43 in  Weight: 125 lb  HR: 61  BP: 137/68 mmHg  _____________________________________________________________________________  __        Procedure  Complete Portable Echo Adult. Echocardiogram with two-dimensional, color and  spectral Doppler performed.  _____________________________________________________________________________  __        Interpretation Summary  Global and regional left ventricular function is normal with an EF of 60-65%.  Right ventricular function, chamber size, wall motion, and thickness are  normal.  No significant valvular abnormalities were noted.  No pericardial effusion is present.     This study was compared with the study from 2020. No significant changes  noted.  _____________________________________________________________________________  __         Left Ventricle  Global and regional left ventricular function is normal with an EF of 60-65%.  Left ventricular size is normal. Left ventricular wall thickness is normal.  Left ventricular diastolic function is normal. No regional wall motion  abnormalities are seen.     Right Ventricle  Right ventricular function, chamber size, wall motion, and thickness are  normal.     Atria  Both atria appear normal.     Mitral Valve  The mitral valve is normal. Trace mitral insufficiency is present.        Aortic Valve  Aortic valve is normal in structure and function. The aortic valve is  tricuspid.     Tricuspid Valve  The tricuspid valve is normal. Trace tricuspid insufficiency is present.  Pulmonary artery systolic pressure cannot be assessed.     Pulmonic Valve  On Doppler interrogation, there is no significant stenosis or regurgitation.  The valve leaflets are not well visualized.     Vessels  The aorta root is normal. The thoracic aorta is normal. The pulmonary artery  cannot be assessed. The inferior vena cava was normal in size with preserved  respiratory variability. IVC diameter <2.1 cm collapsing >50% with sniff  suggests a normal RA pressure of 3 mmHg.     Pericardium  No pericardial effusion is present.        Compared to Previous Study  This study was compared with the study from 2020 . No significant changes  noted.  _____________________________________________________________________________  __  MMode/2D Measurements & Calculations     Ao root diam: 3.0 cm  asc Aorta Diam: 2.7 cm  LVOT diam: 2.0 cm  LVOT area: 3.1 cm2        Doppler Measurements & Calculations  MV E max leander: 50.4 cm/sec  MV A max leander: 52.9 cm/sec  MV E/A: 0.95  MV dec slope: 196.0 cm/sec2  PA acc time: 0.10 sec  E/E' av.5  Lateral E/e': 3.9  Medial E/e': 9.1        _____________________________________________________________________________  __        Report approved by: MD Mark Khanna 2020 11:38 AM                    Pending Results:   Unresulted Labs Ordered in the Past 30 Days of this Admission     Date and Time Order Name Status Description    12/26/2020 0635 Keppra (Levetiracetam) Level In process     12/26/2020 0220 Urine Culture Preliminary                   Discharge Instructions and Follow-Up:     Discharge Procedure Orders   X-ray Cervical spine 2-3 vws   Standing Status: Future Standing Exp. Date: 03/26/21     Order Specific Question Answer Comments   Priority Routine    Clinical Info for the Interpreting Provider upright/standing C spine x-rays AP and lateral prior to neurosurgery appointment in 2 weeks      Reason for your hospital stay   Order Comments: Fall  Dizziness     Follow Up and recommended labs and tests   Order Comments: Follow up with primary in 1 week     Activity   Order Comments: Your activity upon discharge: activity as tolerated     Order Specific Question Answer Comments   Is discharge order? Yes      When to contact your care team   Order Comments: Return to the ED with fever, uncontrolled nausea, vomiting, unrelieved pain,   dizziness, chest pain, shortness of breath, loss of consciousness, and any new or concerning symptoms.     Discharge Instructions   Order Comments: You were admitted for fall. You were found to have urinary tract infection. You will be discharged with oral antibiotic. Please take medication as prescribed. Continue to take home medications as before. Drink lots of fluids. Return if having worsening symptoms. Follow up with your primary in 1 week.     No CPR- Do NOT Intubate     Order Specific Question Answer Comments   Code status determined by: Discussion with patient/ legal decision maker      Diet   Order Comments: Follow this diet upon discharge: Regular     Order Specific Question Answer Comments   Is discharge order? Yes           Attestation:  Bianca Agustin PA-C.

## 2020-12-26 NOTE — PROGRESS NOTES
[facility]   Tertiary Survey Progress Note     Date of Service: 12/26/2020    Trauma Mechanism: Fall from bed  Date of Injury: 12/25/20  Known Injuries:  1. None    Assessment & Plan  Sonya Foote is a 70 year old female ([id number]), PMH type 2 diabetes mellitus, hypertension, coronary artery disease status post CABG, COPD, seizure disorder, tobacco abuse disorder, neurogenic bladder, bilateral lower extremity amputations, chronic pain syndrome and uses fentanyl intrathecal pain pump, chronic pain syndromes, etc.    Neuro/Pain:  # Cspine cleared by other service  # Acute on chronic pain   - For headaches recommend scheduled Tylenol  - PTA medications included: oxycodone, tylenol, capsaicin-menthol-methyl, pregabalin.  cream, Voltaren gel,      Plan:  - Tertiary exam completed. No injuries notes.  - Discussed discharge plan with   - No further trauma workup needed. Trauma will sign off. Please contact with any questions or concerns. Job code [id number]                Interval History   Review of Systems   Skin: negative  Eyes: negative  Ears/Nose/Throat: negative  Respiratory: No shortness of breath, dyspnea on exertion, cough, or hemoptysis  Cardiovascular: negative  Gastrointestinal: negative  Genitourinary: suprapubic cath  Musculoskeletal: Bilateral above knee amputation   Neurologic: positive for headaches  Psychiatric: negative  Hematologic/Lymphatic/Immunologic: Sickle Cell   Endocrine: negative     Physical Exam   Kindra Coma Scale - Total 15/15  Frailty Questionnaire: To be done for all patients age 60+  F (Fatigue): Is the patient easily fatigued? YES = 1  R (Resistance): Is the patient unable to walk one flight of stairs? YES = 1  A (Ambulation): Is the patient unable to walk one block? YES = 1  I  (Illness): Does the patient have more than five illnesses? YES = 1  L (Loss of weight): Has the patient lost more than 5% of weight in the past 6 months. NO = 0  Lost  five pounds or more in the last 3 months without trying? AND/OR Unintended weight loss?  Does the patient have difficulty performing housework such as washing windows or scrubbing floors? AND Activity in a typical 24-hour day- No moderate or vigorous activity    Score: 4, bilat LE amputation baseline     Score:3-5: PLAN: Palliative Care Consult, PT/OT Consult, consider PM&R, Delirium Prevention Strategies                           Physical Exam  Constitutional: Awake, alert, cooperative, no apparent distress.  Eyes: Lids and lashes normal, pupils equal, round and reactive to light, extra ocular muscles intact, sclera clear, conjunctiva normal.  HENT: Normocephalic, atraumatic  Respiratory: No increased work of breathing, mild rhonchi   Cardiovascular:  regular rate and rhythm,  GI: Normal bowel sounds, abdomen soft, non-distended, non-tender, no guarding  Skin: Normal skin color, no redness, warmth, or swelling, no ecchymosis, no abrasions, and no jaundice.  Musculoskeletal: Bilat LE amputation (above knee) There is no redness, warmth, or swelling of the joints.  Neurologic: Awake, alert, oriented. Cranial nerves II-XII are grossly intact.  Strength and sensory is intact. No focal deficits.  Neuropsychiatric: Calm, normal eye contact, alert, affect appropriate to situation, oriented, thought process normal.    Temp: 98.4  F (36.9  C) Temp src: Oral BP: 107/71 Pulse: 74   Resp: 16 SpO2: 97 % O2 Device: None (Room air)    Vitals:    12/25/20 2248   Weight: 56.7 kg (125 lb)     Vital Signs with Ranges  Temp:  [97.7  F (36.5  C)-98.4  F (36.9  C)] 98.4  F (36.9  C)  Pulse:  [69-76] 74  Resp:  [16] 16  BP: (107-131)/(64-83) 107/71  SpO2:  [91 %-100 %] 97 %  No intake/output data recorded.      MEE Greenberg  To contact the trauma service use job code pager [phone],   Numeric texts or alpha text through [facility]

## 2020-12-26 NOTE — ED PROVIDER NOTES
[place] EMERGENCY DEPARTMENT ([facility])  12/25/20   History     Chief Complaint   Patient presents with     Fall     HPI  Sonya Foote is a 70 year old female (gender affirming surgery 1974), who has a PMH of seizure disorder, hx of CAD w/ inferior MI in 2016 s/p PCI, CVA, HTN, DM2, COPD, JOSE, ROGER, MDD, chronic pain syndrome, and neurogenic bladder, bilateral LE amputations, chronic neuropathy, chronic neck pain per pt, et al., who presents to the ED via EMS from [facility] care facility after a fall, reporting head, neck, upper back, left shoulder and left hip pain.     Patient was brought in by EMS (C-collar applied upon arrival to ED), with concern of multiple pain complaints after a fall. Patient reports that she had a fall from bed prior to arrival.  Patient reports feeling thirsty, turned in bed to try to get a glass of water, felt somewhat dizzy, and then fell out of the bed.  In doing so she landed on the ground, and between the nightstand in a nearby heater.  In doing so she reports hitting the left side of her head on the ground as well as her left hip and shoulder which took the brunt of this fall.  Since having this fall from bed height she is reporting head pain, but denies loss of consciousness.  Neck pain diffusely throughout the whole neck.  She reports that she chronically has neck pain in the setting of known arthritis but this is worse than usual.  As well as left shoulder pain.  No radiation of pain to the arm.  Has chronic history of neuropathy; pain is mostly over the lateral portion of the shoulder over the deltoid area.  Denies more central or clavicular type pain, no significant posterior shoulder pain.  Does not feel as though the shoulder is out of joint.  She also reports some upper back discomfort.  No particular low back discomfort.  The left hip that is sore throughout the lateral portion of the hip, not really in the groin/joint area.  She has bilateral lower extremity  amputations but does not have any trouble with the distal portion of the limb stumps.    As mentioned, she did not have any LOC.  Outside of when she turned and having a brief moment of dizziness she was feeling well prior to this.  The dizziness was only when she turned quickly and there was no associated vertiginous symptoms.  There is no current dizziness, there are no other preceding symptoms.  No chest pain or palpitations.  No lightheadedness, syncope or near syncope.  No chest discomfort, nausea or vomiting.  No abdominal symptoms.  Has a neurogenic bladder and has not had any new urinary symptoms.  No vision or hearing changes.  No mouth, jaw or HEENT symptoms reported though she does not like the sensation of the c-collar that was placed on arrival.  No speech changes.    Patient also reports having history of fibromyalgia and so says can have significant pain as well, in addition to her chronic neck pain, but says all of these usual pain complaints are worse since the fall.  Otherwise reports feeling healthy.  No fevers, or etc.      Per her previous neck discomfort was seen with cervical 3, 4, 5 medial branch blocks on the left neck on 10/30/20. Additionally, per EMR shows patient does have some chronic dyspnea, intermittent chest pressure, GERD history with previous strictures and dilations, the DJD and fibromyalgia as mentioned, osteoporosis, etc.  Has a reported Fentanyl pain pump in place. Unclear of the details as not easily found in our records. Patient reports that she has a history of epilepsy with both petit mall and grand mal seizures in the past.  (Reports that she does not believe that she had a seizure with this incident, no loss of bowel/bladder control (but has indwelling catheter in place, no tongue biting or altered mental status/loss of awareness, etc.).    No other new symptoms or complaints at this time.  Please see ROS for further details.    This part of the medical record was  transcribed by Waqar Whaley Medical Scribe.     I have reviewed the Medications, Allergies, Past Medical and Surgical History, and Social History in the [facility] system.    Past Medical History:   Diagnosis Date     Acid reflux disease      Amputation above knee (H)     bilateral     Benign essential hypertension      Blind left eye     due to central retinal artery occlusion     CAD (coronary artery disease)     UA and inferior MI in 2016 s/p PCI     Chronic back pain      Chronic neck pain      Chronic pain syndrome     with fentanyl intrathecal pain pump     Complex sleep apnea syndrome      COPD (chronic obstructive pulmonary disease) (H)      Dysphagia     chronic due to esophageal strictures and multiple previous dilitations      ROGER (generalized anxiety disorder)      History of blood transfusion     x5; no adverse reactions     History of central retinal artery occlusion 2006    left-sided     History of stroke     residual left-sided weakness     Hx of carotid artery stenosis     s/p left carotid stent in 2006 and balloon angioplasty in 2016     MDD (major depressive disorder)      Neurogenic bladder     SPT in place     JOSE (obstructive sleep apnea)      Osteoporosis      PAD (peripheral artery disease) (H)      Peripheral neuropathy      Person who has had sex change operation     male to female     Seizure disorder (H)     many years since last grand mal; daily, brief petit mals     Sickle cell trait (H)      Type 2 diabetes mellitus (H)        Past Surgical History:   Procedure Laterality Date     AMPUTATE LEG ABOVE KNEE Left 6/11/2016    Procedure: AMPUTATE LEG ABOVE KNEE;  Surgeon: Mello Rodriguez MD;  Location:  OR     AMPUTATE LEG BELOW KNEE Right 11/7/2016    Procedure: AMPUTATE LEG BELOW KNEE;  Surgeon: Savannah Durant MD;  Location:  OR     AMPUTATE REVISION STUMP LOWER EXTREMITY Right 11/11/2016    Procedure: AMPUTATE REVISION STUMP LOWER EXTREMITY;  Surgeon: Savannah Durant MD;  Location:   OR     AMPUTATE REVISION STUMP LOWER EXTREMITY Right 11/16/2016    Procedure: AMPUTATE REVISION STUMP LOWER EXTREMITY;  Surgeon: Savannah Durant MD;  [facility]     AMPUTATE TOE(S) Right 1/5/2016    Procedure: AMPUTATE TOE(S);  Surgeon: Mello Gaines DPM;  Location: [facility]     ANGIOGRAM Bilateral 11/21/2014    Procedure: ANGIOGRAM;  Surgeon: Savannah Durant MD;  [facility]     ANGIOGRAM Left 1/16/2015    Procedure: ANGIOGRAM;  Surgeon: Savannah Durant MD;  [facility]     ANGIOGRAM Bilateral 9/14/2015    Procedure: ANGIOGRAM;  Surgeon: Savannah Durant MD;  [facility]     ANGIOGRAM Left 10/12/2015    Procedure: ANGIOGRAM;  Surgeon: Savannah Durant MD;  [facility]     ANGIOGRAM Right 6/6/2016    Procedure: ANGIOGRAM;  Surgeon: Savannah Durant MD;  [facility]     ANGIOPLASTY Right 6/6/2016    Procedure: ANGIOPLASTY;  Surgeon: Savannah Durant MD;  [facility]     APPENDECTOMY       BREAST BIOPSY, RT/LT Right     benign     CATARACT IOL, RT/LT Right      CHOLECYSTECTOMY       COLONOSCOPY N/A 8/25/2014    Procedure: COLONOSCOPY;  Surgeon: Mello Ferrer MD;  [facility]     COLONOSCOPY WITH CO2 INSUFFLATION N/A 8/20/2014    Procedure: COLONOSCOPY WITH CO2 INSUFFLATION;  Surgeon: Duane, William Charles, MD;  Location: [facility] OR     CYSTOSCOPY, INJECT BOTOX N/A 5/21/2020    Procedure: CYSTOSCOPY, WITH BOTULINUM TOXIN INJECTION;  Surgeon: Loki Gordon MD;  [facility]     CYSTOSCOPY, INJECT BOTOX N/A 10/8/2020    Procedure: CYSTOSCOPY, INJECT BOTOX INTO BLADDER, SUPRAPUBIC TUBE EXCHNAGE;  Surgeon: Loki Gordon MD;  [facility]     CYSTOSCOPY, INTRAVESICAL INJECTION N/A 10/31/2019    Procedure: CYSTOSCOPY, BOTOX INJECTION, Suprapubic Catheter Exchange;  Surgeon: Loki Gordon MD;  [facility]     CYSTOSTOMY, INSERT TUBE SUPRAPUBIC, COMBINED N/A 1/16/2018    Procedure: COMBINED CYSTOSTOMY, INSERT TUBE SUPRAPUBIC;  Cystoscopy, Intraoperative Ultrasound,  Suprapubic Tube Placement;  Surgeon: Keanu Dawson MD;  [facility]     ENDARTERECTOMY FEMORAL  5/23/2014    Procedure: ENDARTERECTOMY FEMORAL;  Surgeon: Jason Joshi MD;  [facility]     ESOPHAGOSCOPY, GASTROSCOPY, DUODENOSCOPY (EGD), COMBINED  12/14/2012    Procedure: COMBINED ESOPHAGOSCOPY, GASTROSCOPY, DUODENOSCOPY (EGD), BIOPSY SINGLE OR MULTIPLE;  ESOPHAGOSCOPY, GASTROSCOPY, DUODENOSCOPY (EGD), DILATATION ;  Surgeon: Elizabeth Stevenson MD;  Location:  GI     ESOPHAGOSCOPY, GASTROSCOPY, DUODENOSCOPY (EGD), COMBINED  12/31/2013    Procedure: COMBINED ESOPHAGOSCOPY, GASTROSCOPY, DUODENOSCOPY (EGD), BIOPSY SINGLE OR MULTIPLE;;  Surgeon: Clemente Lopez MD;  Location:  GI     ESOPHAGOSCOPY, GASTROSCOPY, DUODENOSCOPY (EGD), COMBINED  4/1/2014    Procedure: COMBINED ESOPHAGOSCOPY, GASTROSCOPY, DUODENOSCOPY (EGD);;  Surgeon: Clemente Lopez MD;  Location:  GI     ESOPHAGOSCOPY, GASTROSCOPY, DUODENOSCOPY (EGD), COMBINED  6/28/2014    Procedure: COMBINED ESOPHAGOSCOPY, GASTROSCOPY, DUODENOSCOPY (EGD);  Surgeon: Clemente Lopez MD;  Location:  GI     ESOPHAGOSCOPY, GASTROSCOPY, DUODENOSCOPY (EGD), COMBINED N/A 8/20/2014    Procedure: COMBINED ESOPHAGOSCOPY, GASTROSCOPY, DUODENOSCOPY (EGD), BIOPSY SINGLE OR MULTIPLE;  Surgeon: Duane, William Charles, MD;  Location:  OR     ESOPHAGOSCOPY, GASTROSCOPY, DUODENOSCOPY (EGD), COMBINED N/A 8/22/2014    Procedure: COMBINED ESOPHAGOSCOPY, GASTROSCOPY, DUODENOSCOPY (EGD), BIOPSY SINGLE OR MULTIPLE;  Surgeon: Mello Ferrer MD;  Location:  GI     ESOPHAGOSCOPY, GASTROSCOPY, DUODENOSCOPY (EGD), COMBINED N/A 10/2/2014    Procedure: COMBINED ESOPHAGOSCOPY, GASTROSCOPY, DUODENOSCOPY (EGD), BIOPSY SINGLE OR MULTIPLE;  Surgeon: Remy Haskins MD;  Location:  GI     ESOPHAGOSCOPY, GASTROSCOPY, DUODENOSCOPY (EGD), COMBINED Left 12/15/2014    Procedure: COMBINED ESOPHAGOSCOPY, GASTROSCOPY, DUODENOSCOPY (EGD), BIOPSY SINGLE OR MULTIPLE;   Surgeon: Remy Haskins MD;  [facility]     ESOPHAGOSCOPY, GASTROSCOPY, DUODENOSCOPY (EGD), COMBINED N/A 2/25/2015    Procedure: COMBINED ENDOSCOPIC ULTRASOUND, ESOPHAGOSCOPY, GASTROSCOPY, DUODENOSCOPY (EGD), FINE NEEDLE ASPIRATE/BIOPSY;  Surgeon: Clemente Lugo MD;  [facility]     ESOPHAGOSCOPY, GASTROSCOPY, DUODENOSCOPY (EGD), COMBINED Left 2/25/2015    Procedure: COMBINED ESOPHAGOSCOPY, GASTROSCOPY, DUODENOSCOPY (EGD), BIOPSY SINGLE OR MULTIPLE;  Surgeon: Clemente Lugo MD;  [facility]     ESOPHAGOSCOPY, GASTROSCOPY, DUODENOSCOPY (EGD), COMBINED N/A 9/25/2016    Procedure: COMBINED ESOPHAGOSCOPY, GASTROSCOPY, DUODENOSCOPY (EGD);  Surgeon: Aziza Patiño MD;  [facility]     ESOPHAGOSCOPY, GASTROSCOPY, DUODENOSCOPY (EGD), COMBINED N/A 1/18/2017    Procedure: COMBINED ESOPHAGOSCOPY, GASTROSCOPY, DUODENOSCOPY (EGD), BIOPSY SINGLE OR MULTIPLE;  Surgeon: Clemente Lopez MD;  [facility]     ESOPHAGOSCOPY, GASTROSCOPY, DUODENOSCOPY (EGD), COMBINED N/A 11/26/2017    Procedure: COMBINED ESOPHAGOSCOPY, GASTROSCOPY, DUODENOSCOPY (EGD), REMOVE FOREIGN BODY;  Esophagogastroduodenoscopy with foreign body extraction  ;  Surgeon: Herberth Castrejon MD;  [facility]     ESOPHAGOSCOPY, GASTROSCOPY, DUODENOSCOPY (EGD), COMBINED N/A 11/26/2017    Procedure: COMBINED ESOPHAGOSCOPY, GASTROSCOPY, DUODENOSCOPY (EGD), REMOVE FOREIGN BODY;;  Surgeon: Herberth Castrejon MD;  [facility]     ESOPHAGOSCOPY, GASTROSCOPY, DUODENOSCOPY (EGD), COMBINED N/A 4/10/2020    Procedure: ESOPHAGOGASTRODUODENOSCOPY (EGD);  Surgeon: Yael Cabarl MD;  [facility]     FASCIOTOMY LOWER EXTREMITY Left 6/10/2016    Procedure: FASCIOTOMY LOWER EXTREMITY;  Surgeon: Mello Rodriguez MD;  [facility]     HC CAPSULE ENDOSCOPY N/A 8/25/2014    Procedure: CAPSULE/PILL CAM ENDOSCOPY;  Surgeon: Remy Haskins MD;  [facility]     HC CAPSULE ENDOSCOPY N/A 10/2/2014    Procedure:  CAPSULE/PILL CAM ENDOSCOPY;  Surgeon: Remy Haskins MD;  [facility]     INJECT BLOCK MEDIAL BRANCH CERVICAL/THORACIC/LUMBAR Left 10/30/2020    Procedure: Cervical 3, 4, and 5 Medial Branch Block;  Surgeon: Evelyn Lima MD;  [place] [facility] [place]     ORTHOPEDIC SURGERY      broken wrist repair     REVISE CATHETER INTRATHECAL  08/24/2020     SEX TRANSFORMATION SURGERY, MALE TO FEMALE  1974 1974     SINUS SURGERY      cyst removed     TONSILLECTOMY       VASCULAR SURGERY  2006    Left carotid stent     Past medical history, past surgical history, medications, and allergies were reviewed with the patient.     Family History   Problem Relation Age of Onset     Dementia Mother      Diabetes Mother         type unknown     Coronary Artery Disease Mother         MI     Glaucoma Father      Diabetes Father         type unknown     Heart Failure Father      Schizophrenia Brother      Depression Brother      Suicide Sister      Depression Sister      Diabetes Sister      Ovarian Cancer Maternal Aunt      Leukemia Maternal Aunt      Diabetes Maternal Grandmother      Diabetes Maternal Grandfather      Diabetes Paternal Grandmother      Diabetes Paternal Grandfather      Breast Cancer Sister      Cerebrovascular Disease Brother      Colon Cancer No family hx of      Macular Degeneration No family hx of        Social History     Tobacco Use     Smoking status: Current Every Day Smoker     Packs/day: 1.00     Years: 30.00     Pack years: 30.00     Types: Cigarettes, Cigars     Smokeless tobacco: Never Used     Tobacco comment: 1.5 packs per day    Substance Use Topics     Alcohol use: No     Alcohol/week: 0.0 standard drinks      Social history was reviewed with the patient.     Review of Systems   Constitutional: Negative for chills and fever.   HENT: Negative.  Negative for sore throat, tinnitus and trouble swallowing.    Eyes: Negative for pain and visual disturbance.   Respiratory: Negative for cough and  shortness of breath.    Cardiovascular: Negative for chest pain, palpitations and leg swelling.   Gastrointestinal: Negative for abdominal pain, blood in stool, constipation, diarrhea, nausea and vomiting.   Endocrine: Negative for polydipsia and polyuria.   Genitourinary: Negative.  Negative for dysuria, frequency, hematuria, pelvic pain and urgency.   Musculoskeletal: Positive for back pain, myalgias and neck pain. Negative for neck stiffness.   Skin: Negative for color change and rash.   Allergic/Immunologic: Negative for immunocompromised state.   Neurological: Positive for headaches. Negative for dizziness, syncope, weakness, light-headedness and numbness. Seizures: Hx seizure disorder, but no generalized/grand mal seizures recently, has regular petit mal per pt.   Psychiatric/Behavioral: Negative for self-injury and suicidal ideas.   All other systems reviewed and are negative.      Physical Exam   BP: 109/64  Pulse: 76  Temp: 97.7  F (36.5  C)  Resp: 16  SpO2: 94 %      CONSTITUTIONAL: Well-developed and well-nourished. Awake and alert. Non-toxic appearance. No acute distress.   HENT:   - Head: Normocephalic and atraumatic.   - Ears: Hearing and external ear grossly normal.   - Nose: Nose normal. No rhinorrhea. No epistaxis.   - Mouth/Throat: MMM  EYES: Conjunctivae and lids are normal. No scleral icterus.   NECK: Patient does report diffuse neck pain (of note, she did start moaning even before I started palpating her neck.  ROM not assessed as she has placed in c-collar and under C spine precautions given the fall and report of neck pain.   CARDIOVASCULAR: Normal rate, regular rhythm and no appreciable abnormal heart sounds.  PULMONARY/CHEST: Normal work of breathing. No accessory muscle usage or stridor. No respiratory distress.  No appreciable abnormal breath sounds.  ABDOMEN: Soft, non-distended. No tenderness. No rigidity, rebound or guarding. SP catheter in place  MUSCULOSKELETAL: Extremities warm and  seemingly well perfused. No edema or calf tenderness. Bilateral lower extremity amputations.  She is reporting neck pain as described further above.  She also reports pain diffusely of the left lateral shoulder.  She winched severely even to light touch right away, on my on palpation of the right shoulder, we also palpaltated the left shoulder and did not appear as uncomfortable for the patient.  I cannot appreciate any joint effusions.  No gross deformity.  No pain or deformity over the clavicle or over the scapula.  Normal distal pulses and cap refill.  Patient reports bilateral distal upper extremity altered sensation that she says is chronic in the at baseline setting of chronic neuropathy.  No obvious focal neuro deficits appreciated extremities.  She has diffuse discomfort to palpation over the left lateral hip.  The hip joint itself seems to range well.  No acute skin findings or breakdowns, the stump end appears WNL.  No particular low back pain she had appreciated,  Diffuse upper back discomfort on palpation.  NEUROLOGIC: Awake, alert. Not disoriented. She displays no atrophy and no tremor. Normal tone. No seizure activity. GCS 15. Patient reports chronic neuropathy, unchanged   SKIN: Skin is warm and dry. No rash noted. No diaphoresis. No pallor.   PSYCHIATRIC: Normal mood and affect. Speech and behavior normal. Thought processes linear. Cognition and memory are normal.      ED Course     ED Course as of Dec 26 0433   Fri Dec 25, 2020   2253 Arrived by EMS; Placed in C-collar on arrival to ED.       Sat Dec 26, 2020   0248 Neurosurgery saw patient. Trauma consult pending. Both following up with admitting EDOU team.                EKG Interpretation:      Interpreted by Cary Dick MD  Time reviewed: 2320  Symptoms at time of EKG: Pain after fall   Rhythm: normal sinus   Rate: Normal  Axis: Normal  Ectopy: none  Conduction: normal  ST Segments/ T Waves: No ST-T wave changes and No acute ischemic  changes  Comparison to prior: (vs. 10 Quin 2020)  No significant changes   Clinical Impression: Sinus, no acute changes    ----------------------------------------------------------------    TRAUMA CONSULT  - Pt did not meet Trauma Team Activation criteria. Consulted was ordered, along w/ Neurosurgery consult for fall w/ ongoing neck pain, but negative initial imaging.     Assessments & Plan (with Medical Decision Making)   IMPRESSION: 70 year old female w/ PMH notable for (gender affirming surgery 1974), who has a PMH of seizure disorder, hx of CAD w/ inferior MI in 2016 s/p PCI, CVA, HTN, DM2, COPD, JOSE, ROGER, MDD, chronic pain syndrome, and neurogenic bladder, bilateral LE amputations, chronic neuropathy, chronic neck pain per pt, et al., who presents to the ED via EMS from congregate care facility after a fall, reporting head, neck, upper back, left shoulder and left hip pain.       Clinically, patient appears nontoxic, NAD.  Vitals grossly WNL.  Otherwise on examination, neck ROM not assessed as she is placed in c-collar, but does have diffuse discomfort to palpation throughout the posterior neck.  Pain also reported on palpation of the left lateral shoulder but no obvious new/acute neurovascular deficits appreciated, and there does not appear to be any gross deformity, dislocation, etc.  Skin normal, normal compartments.  With regards to the left hip pain, no obvious gross deformity or obvious joint involvement but does have discomfort with the fall.    DDx includes, but not limited to, minor pain complaints/exacerbation of chronic pain after a low-grade fall.  Less likely intracranial injury, ICH, bony injuries such as fractures or dislocations, certainly could have bony contusions etc. though altogether on exam generally seems reassuring.  This sounds to a bit more of a mechanical fall and just rolling out to bed, however with the possible dizziness did consider intracranial, cardiac, etc. type of episode  given her complicated past medical history, though those are probably all less likely, may warrant further observation/work-up.  I will discuss with the trauma and medicine teams further to have a disposition discussion.    PLAN: ECG, laboratory studies, urine studies, head, C-spine, T-spine, left shoulder and left hip/pelvis imaging  - Symptom management as needed  - Pending ED course but may need admission for work-up of the dizziness given it led to fall. In a medically complex and comorbid patient.  (With previous MRIs, CVA etc.  - Risks/benefits of pursuing imaging review and accepted.     RESULTS:  - Labs:  Some mild AST, ALT elevation.   --- Asymptomatic pending  - Urine: Possible UTI, but asymptomatic in that regard. Culture added on and pending.   - Imaging: Written preliminary reports reviewed:  --- No acute injuries findings reported for CT Head, C-spine, T-spine, nor for the plain films of the shoulder, pelvis, or hip. CXR pending at signout. See EMR for details and full reports once available.   --- Results/available reports reviewed w/ patient who expresses understanding of findings and F/U recommendations.    INTERVENTIONS:   - C-collar (aspen)  - Fentanyl (12.5mcg IV x1)    RE-EVALUATION:  - Pt continues to have neck pain.  - otherwise continues to do well here in the ED, no acute issues or apparent concerning changes in vitals or clinical appearance.    DISCUSSIONS:  - w/ Neurosurgery & Trauma: Discussed w/ Trauma and Neurosurgery. NS has seen and evaluated the patient here in the ED. They are staffing and will contact covering service back with recommendations. Trauma yet to see at signout, but no objective findings on initial workup, just ongoing neck pain.   - w/ EDOU: Reviewed patient/presentation, current state of workup/any pending studies. They will admit for further evaluation/management, F/U pending studies as needed, coordinate w/ consulting services as needed. No additional  requests/recommendations for workup/management for in the ED at this time.   - w/ Patient: I have reviewed the available findings, plan with the patient. She expressed understanding and agreement with this plan. All questions answered to the best of our ability at this time.     DISPOSITION/PLANNING:  - IMPRESSION: Dizziness, fall, multiple pain complaints  --- Ongoing neck pain after fall (Aspen collar in place)  --- Abnormal UA findings (w/o symptoms, in setting of indwelling catheter)  - DISPOSITION:  Admit to EDOU for further evaluation/management  - FOLLOW-UP: Finalized imaging reports  - PENDING: Thoughts/recommendations from consulting Neurosurgery and Trauma teams, urine culture  - RECOMMENDATIONS:  Trauma/Neurosurgery consults for fall/pain; workup for dizziness (MR vs. Neuro consult, echo/cardiac workup/consult as needed, etc.), F/U urine culture and initiate Abx as needed (being mindful of allergies)    This part of the medical record was transcribed by Cristian Javier Scribe.   ______________________________________________________________________       12/25/2020   [facility] EMERGENCY DEPARTMENT     Cary Dick MD  12/26/20 [id number]

## 2020-12-27 LAB — LEVETIRACETAM SERPL-MCNC: 10 UG/ML (ref 12–46)

## 2020-12-28 LAB
BACTERIA SPEC CULT: ABNORMAL
BACTERIA SPEC CULT: ABNORMAL
Lab: ABNORMAL
SPECIMEN SOURCE: ABNORMAL

## 2020-12-29 ENCOUNTER — DOCUMENTATION ONLY (OUTPATIENT)
Dept: OTHER | Facility: CLINIC | Age: 70
End: 2020-12-29

## 2020-12-29 ENCOUNTER — TELEPHONE (OUTPATIENT)
Dept: NEUROSURGERY | Facility: CLINIC | Age: 70
End: 2020-12-29

## 2020-12-29 ENCOUNTER — TELEPHONE (OUTPATIENT)
Dept: PULMONOLOGY | Facility: CLINIC | Age: 70
End: 2020-12-29

## 2020-12-29 NOTE — TELEPHONE ENCOUNTER
Left voicemail for patient to contact me directly (number provided) to schedule a follow up appointment with Dr. Barroso after her last visit in Sept 2020. Informed her that it could be done virtually (video being preferred, but telephone if unable to do video). Informed her that I would also send her a letter as a reminder as well.

## 2020-12-30 ENCOUNTER — RECORDS - HEALTHEAST (OUTPATIENT)
Dept: LAB | Facility: CLINIC | Age: 70
End: 2020-12-30

## 2020-12-30 LAB
SARS-COV-2 PCR COMMENT: NORMAL
SARS-COV-2 RNA SPEC QL NAA+PROBE: NEGATIVE
SARS-COV-2 VIRUS SPECIMEN SOURCE: NORMAL

## 2021-01-04 NOTE — TELEPHONE ENCOUNTER
SPINE PATIENTS - NEW PROTOCOL PREVISIT    RECORDS RECEIVED FROM: Internal   Date of Appt: 1/8/21   NOTES (FOR ALL VISITS) STATUS DETAILS   OFFICE NOTE from referring provider Internal SEE INPATIENT NOTES   OFFICE NOTE from other specialist N/A    DISCHARGE SUMMARY from hospital [facility]:  12/25/20-12/26/20   DISCHARGE REPORT from ER N/A    EMG REPORT N/A    MEDICATION LIST Internal    IMAGING  (FOR ALL VISITS)     MRI (HEAD, NECK, SPINE) Internal [facility]:  MRI Cervical Spine 7/27/20   [facility] (SPINE) [facility]* [facility]  XR Cervical Spine 1/5/21   CT (HEAD, NECK, SPINE) [facility]:  CT Thoracic Spine 12/25/20  CT Cervical Spine 12/25/20

## 2021-01-05 ENCOUNTER — ANCILLARY PROCEDURE (OUTPATIENT)
Dept: GENERAL RADIOLOGY | Facility: CLINIC | Age: 71
End: 2021-01-05
Attending: STUDENT IN AN ORGANIZED HEALTH CARE EDUCATION/TRAINING PROGRAM
Payer: COMMERCIAL

## 2021-01-05 ENCOUNTER — ANCILLARY PROCEDURE (OUTPATIENT)
Dept: ULTRASOUND IMAGING | Facility: CLINIC | Age: 71
End: 2021-01-05
Attending: UROLOGY
Payer: COMMERCIAL

## 2021-01-05 DIAGNOSIS — N31.9 NEUROGENIC BLADDER: ICD-10-CM

## 2021-01-05 DIAGNOSIS — N39.0 URINARY TRACT INFECTION: Primary | ICD-10-CM

## 2021-01-05 DIAGNOSIS — Z11.59 ENCOUNTER FOR SCREENING FOR OTHER VIRAL DISEASES: ICD-10-CM
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
## 2021-01-05 LAB
SARS-COV-2 RNA SPEC QL NAA+PROBE: NORMAL
SPECIMEN SOURCE: NORMAL

## 2021-01-05 PROCEDURE — 76770 US EXAM ABDO BACK WALL COMP: CPT | Performed by: RADIOLOGY

## 2021-01-05 PROCEDURE — 87635 SARS-COV-2 COVID-19 AMP PRB: CPT | Performed by: PATHOLOGY

## 2021-01-05 PROCEDURE — 72040 X-RAY EXAM NECK SPINE 2-3 VW: CPT | Performed by: RADIOLOGY

## 2021-01-05 RX ORDER — NITROFURANTOIN 25; 75 MG/1; MG/1
100 CAPSULE ORAL 2 TIMES DAILY
Qty: 10 CAPSULE | Refills: 0 | Status: SHIPPED | OUTPATIENT
Start: 2021-01-05 | End: 2021-01-10

## 2021-01-06 ENCOUNTER — ANESTHESIA EVENT (OUTPATIENT)
Dept: SURGERY | Facility: CLINIC | Age: 71
End: 2021-01-06
Payer: COMMERCIAL

## 2021-01-06 ENCOUNTER — TELEPHONE (OUTPATIENT)
Dept: INTERNAL MEDICINE | Facility: CLINIC | Age: 71
End: 2021-01-06

## 2021-01-06 DIAGNOSIS — S78.119A AMPUTATION ABOVE KNEE (H): Primary | ICD-10-CM

## 2021-01-06 LAB
LABORATORY COMMENT REPORT: NORMAL
SARS-COV-2 RNA SPEC QL NAA+PROBE: NEGATIVE
SPECIMEN SOURCE: NORMAL

## 2021-01-06 ASSESSMENT — ENCOUNTER SYMPTOMS: SEIZURES: 1

## 2021-01-06 ASSESSMENT — COPD QUESTIONNAIRES: COPD: 1

## 2021-01-06 ASSESSMENT — LIFESTYLE VARIABLES: TOBACCO_USE: 1

## 2021-01-06 NOTE — TELEPHONE ENCOUNTER
Reason for Call:  Other call back    Detailed comments: Home care is requesting orders for Skilled Nursing 1 time per week for 2 weeks and then every other week for 2 weeks.    Phone Number Patient can be reached at: Other phone number:  [phone]    Best Time: any    Can we leave a detailed message on this number? YES    Call taken on 1/6/2021 at 12:44 PM by Kyle Sanchez

## 2021-01-07 ENCOUNTER — HOSPITAL ENCOUNTER (OUTPATIENT)
Facility: CLINIC | Age: 71
Discharge: NURSING, REHAB OR LONG-TERM CARE | End: 2021-01-07
Attending: UROLOGY | Admitting: UROLOGY
Payer: COMMERCIAL

## 2021-01-07 ENCOUNTER — ANESTHESIA (OUTPATIENT)
Dept: SURGERY | Facility: CLINIC | Age: 71
End: 2021-01-07
Payer: COMMERCIAL

## 2021-01-07 VITALS
WEIGHT: 125 LBS | BODY MASS INDEX: 28.93 KG/M2 | SYSTOLIC BLOOD PRESSURE: 121 MMHG | OXYGEN SATURATION: 98 % | DIASTOLIC BLOOD PRESSURE: 62 MMHG | RESPIRATION RATE: 18 BRPM | TEMPERATURE: 98.1 F | HEIGHT: 55 IN | HEART RATE: 63 BPM

## 2021-01-07 DIAGNOSIS — N31.9 NEUROGENIC BLADDER: Primary | ICD-10-CM

## 2021-01-07 LAB — GLUCOSE BLDC GLUCOMTR-MCNC: 86 MG/DL (ref 70–99)

## 2021-01-07 PROCEDURE — 710N000012 HC RECOVERY PHASE 2, PER MINUTE: Performed by: UROLOGY

## 2021-01-07 PROCEDURE — 250N000009 HC RX 250: Performed by: STUDENT IN AN ORGANIZED HEALTH CARE EDUCATION/TRAINING PROGRAM

## 2021-01-07 PROCEDURE — 360N000075 HC SURGERY LEVEL 2, PER MIN: Performed by: UROLOGY

## 2021-01-07 PROCEDURE — 999N001017 HC STATISTIC GLUCOSE BY METER IP

## 2021-01-07 PROCEDURE — 999N000141 HC STATISTIC PRE-PROCEDURE NURSING ASSESSMENT: Performed by: UROLOGY

## 2021-01-07 PROCEDURE — 250N000011 HC RX IP 250 OP 636: Performed by: STUDENT IN AN ORGANIZED HEALTH CARE EDUCATION/TRAINING PROGRAM

## 2021-01-07 PROCEDURE — 258N000003 HC RX IP 258 OP 636: Performed by: NURSE ANESTHETIST, CERTIFIED REGISTERED

## 2021-01-07 PROCEDURE — 370N000017 HC ANESTHESIA TECHNICAL FEE, PER MIN: Performed by: UROLOGY

## 2021-01-07 PROCEDURE — 272N000001 HC OR GENERAL SUPPLY STERILE: Performed by: UROLOGY

## 2021-01-07 PROCEDURE — 250N000020 HC RX IP 250 OP 636 J0585: Performed by: UROLOGY

## 2021-01-07 PROCEDURE — 250N000011 HC RX IP 250 OP 636: Performed by: NURSE ANESTHETIST, CERTIFIED REGISTERED

## 2021-01-07 PROCEDURE — 52287 CYSTOSCOPY CHEMODENERVATION: CPT | Mod: GC | Performed by: UROLOGY

## 2021-01-07 PROCEDURE — 250N000009 HC RX 250: Performed by: NURSE ANESTHETIST, CERTIFIED REGISTERED

## 2021-01-07 RX ORDER — NALOXONE HYDROCHLORIDE 0.4 MG/ML
0.4 INJECTION, SOLUTION INTRAMUSCULAR; INTRAVENOUS; SUBCUTANEOUS
Status: CANCELLED | OUTPATIENT
Start: 2021-01-07 | End: 2021-01-08

## 2021-01-07 RX ORDER — LIDOCAINE 40 MG/G
CREAM TOPICAL
Status: DISCONTINUED | OUTPATIENT
Start: 2021-01-07 | End: 2021-01-07 | Stop reason: HOSPADM

## 2021-01-07 RX ORDER — SODIUM CHLORIDE, SODIUM LACTATE, POTASSIUM CHLORIDE, CALCIUM CHLORIDE 600; 310; 30; 20 MG/100ML; MG/100ML; MG/100ML; MG/100ML
INJECTION, SOLUTION INTRAVENOUS CONTINUOUS
Status: CANCELLED | OUTPATIENT
Start: 2021-01-07

## 2021-01-07 RX ORDER — NALOXONE HYDROCHLORIDE 0.4 MG/ML
0.2 INJECTION, SOLUTION INTRAMUSCULAR; INTRAVENOUS; SUBCUTANEOUS
Status: CANCELLED | OUTPATIENT
Start: 2021-01-07 | End: 2021-01-08

## 2021-01-07 RX ORDER — HYDROMORPHONE HYDROCHLORIDE 1 MG/ML
.3-.5 INJECTION, SOLUTION INTRAMUSCULAR; INTRAVENOUS; SUBCUTANEOUS EVERY 5 MIN PRN
Status: CANCELLED | OUTPATIENT
Start: 2021-01-07

## 2021-01-07 RX ORDER — EPHEDRINE SULFATE 50 MG/ML
INJECTION, SOLUTION INTRAMUSCULAR; INTRAVENOUS; SUBCUTANEOUS PRN
Status: DISCONTINUED | OUTPATIENT
Start: 2021-01-07 | End: 2021-01-07

## 2021-01-07 RX ORDER — PROPOFOL 10 MG/ML
INJECTION, EMULSION INTRAVENOUS CONTINUOUS PRN
Status: DISCONTINUED | OUTPATIENT
Start: 2021-01-07 | End: 2021-01-07

## 2021-01-07 RX ORDER — FENTANYL CITRATE 50 UG/ML
INJECTION, SOLUTION INTRAMUSCULAR; INTRAVENOUS PRN
Status: DISCONTINUED | OUTPATIENT
Start: 2021-01-07 | End: 2021-01-07

## 2021-01-07 RX ORDER — FENTANYL CITRATE 50 UG/ML
25-50 INJECTION, SOLUTION INTRAMUSCULAR; INTRAVENOUS
Status: CANCELLED | OUTPATIENT
Start: 2021-01-07

## 2021-01-07 RX ORDER — OXYCODONE HYDROCHLORIDE 5 MG/1
5 TABLET ORAL EVERY 4 HOURS PRN
Status: CANCELLED | OUTPATIENT
Start: 2021-01-07

## 2021-01-07 RX ORDER — GENTAMICIN SULFATE 80 MG/100ML
80 INJECTION, SOLUTION INTRAVENOUS
Status: COMPLETED | OUTPATIENT
Start: 2021-01-07 | End: 2021-01-07

## 2021-01-07 RX ORDER — LIDOCAINE HYDROCHLORIDE 20 MG/ML
INJECTION, SOLUTION INFILTRATION; PERINEURAL PRN
Status: DISCONTINUED | OUTPATIENT
Start: 2021-01-07 | End: 2021-01-07

## 2021-01-07 RX ORDER — ONDANSETRON 4 MG/1
4 TABLET, ORALLY DISINTEGRATING ORAL EVERY 30 MIN PRN
Status: CANCELLED | OUTPATIENT
Start: 2021-01-07

## 2021-01-07 RX ORDER — ONDANSETRON 2 MG/ML
4 INJECTION INTRAMUSCULAR; INTRAVENOUS EVERY 30 MIN PRN
Status: CANCELLED | OUTPATIENT
Start: 2021-01-07

## 2021-01-07 RX ORDER — SODIUM CHLORIDE, SODIUM LACTATE, POTASSIUM CHLORIDE, CALCIUM CHLORIDE 600; 310; 30; 20 MG/100ML; MG/100ML; MG/100ML; MG/100ML
INJECTION, SOLUTION INTRAVENOUS CONTINUOUS PRN
Status: DISCONTINUED | OUTPATIENT
Start: 2021-01-07 | End: 2021-01-07

## 2021-01-07 RX ORDER — CIPROFLOXACIN 2 MG/ML
400 INJECTION, SOLUTION INTRAVENOUS
Status: DISCONTINUED | OUTPATIENT
Start: 2021-01-07 | End: 2021-01-07 | Stop reason: ALTCHOICE

## 2021-01-07 RX ORDER — SODIUM CHLORIDE, SODIUM LACTATE, POTASSIUM CHLORIDE, CALCIUM CHLORIDE 600; 310; 30; 20 MG/100ML; MG/100ML; MG/100ML; MG/100ML
INJECTION, SOLUTION INTRAVENOUS CONTINUOUS
Status: DISCONTINUED | OUTPATIENT
Start: 2021-01-07 | End: 2021-01-07 | Stop reason: HOSPADM

## 2021-01-07 RX ORDER — CLINDAMYCIN PHOSPHATE 600 MG/50ML
600 INJECTION, SOLUTION INTRAVENOUS
Status: COMPLETED | OUTPATIENT
Start: 2021-01-07 | End: 2021-01-07

## 2021-01-07 RX ADMIN — PROPOFOL 50 MCG/KG/MIN: 10 INJECTION, EMULSION INTRAVENOUS at 10:00

## 2021-01-07 RX ADMIN — GENTAMICIN SULFATE 80 MG: 80 INJECTION, SOLUTION INTRAVENOUS at 10:10

## 2021-01-07 RX ADMIN — SODIUM CHLORIDE, POTASSIUM CHLORIDE, SODIUM LACTATE AND CALCIUM CHLORIDE: 600; 310; 30; 20 INJECTION, SOLUTION INTRAVENOUS at 09:53

## 2021-01-07 RX ADMIN — FENTANYL CITRATE 50 MCG: 50 INJECTION, SOLUTION INTRAMUSCULAR; INTRAVENOUS at 10:12

## 2021-01-07 RX ADMIN — CLINDAMYCIN IN 5 PERCENT DEXTROSE 600 MG: 12 INJECTION, SOLUTION INTRAVENOUS at 09:57

## 2021-01-07 RX ADMIN — LIDOCAINE HYDROCHLORIDE 40 MG: 20 INJECTION, SOLUTION INFILTRATION; PERINEURAL at 10:04

## 2021-01-07 RX ADMIN — Medication 5 MG: at 10:22

## 2021-01-07 RX ADMIN — FENTANYL CITRATE 50 MCG: 50 INJECTION, SOLUTION INTRAMUSCULAR; INTRAVENOUS at 10:04

## 2021-01-07 ASSESSMENT — MIFFLIN-ST. JEOR: SCORE: 733.63

## 2021-01-07 NOTE — ANESTHESIA CARE TRANSFER NOTE
Patient: Sonya Foote    Procedure(s):  CYSTOSCOPY, WITH BOTULINUM TOXIN INJECTION    Diagnosis: Neurogenic bladder [N31.9]  Diagnosis Additional Information: No value filed.    Anesthesia Type:   MAC     Note:  Airway :Face Mask  Patient transferred to:Phase II  Handoff Report: Identifed the Patient, Identified the Reponsible Provider, Reviewed the pertinent medical history, Discussed the surgical course, Reviewed Intra-OP anesthesia mangement and issues during anesthesia, Set expectations for post-procedure period and Allowed opportunity for questions and acknowledgement of understanding      Vitals: (Last set prior to Anesthesia Care Transfer)    CRNA VITALS  1/7/2021 1008 - 1/7/2021 1051      1/7/2021             Resp Rate (observed):  (!) 1        Pt alert and conversing        Electronically Signed By: VICTOR M Valentin CRNA  January 7, 2021  10:51 AM

## 2021-01-07 NOTE — ANESTHESIA PREPROCEDURE EVALUATION
Anesthesia Pre-Procedure Evaluation    Patient: Sonya Foote   MRN:     [id number] Gender:   female   Age:    70 year old :      1950        Preoperative Diagnosis: Neurogenic bladder [N31.9]   Procedure(s):  CYSTOSCOPY, WITH BOTULINUM TOXIN INJECTION     LABS:  CBC:   Lab Results   Component Value Date    WBC 9.5 2020    WBC 9.7 2020    HGB 12.0 2020    HGB 12.7 2020    HCT 35.9 2020    HCT 34.0 (L) 2020     2020     2020     BMP:   Lab Results   Component Value Date     2020     2020    POTASSIUM 3.9 2020    POTASSIUM 4.4 2020    CHLORIDE 107 2020    CHLORIDE 106 2020    CO2 29 2020    CO2 24 2020    BUN 11 2020    BUN 12 2020    CR 0.57 2020    CR 0.52 2020    GLC 96 2020    GLC 84 10/01/2020     COAGS:   Lab Results   Component Value Date    PTT 27 2020    INR 1.09 2020    FIBR 497 (H) 2020     POC:   Lab Results   Component Value Date    BGM 85 2020     OTHER:   Lab Results   Component Value Date    PH 7.40 2015    LACT 1.0 2020    A1C 5.1 2020    CAROL 8.6 2020    PHOS 3.9 2020    MAG 2.1 2020    ALBUMIN 2.9 (L) 2020    PROTTOTAL 7.3 2020    ALT 78 (H) 2020    AST 55 (H) 2020    ALKPHOS 254 (H) 2020    BILITOTAL 0.2 2020    LIPASE 129 2018    ALEX 32 2016    TSH 0.95 2020    T4 0.88 2013    CRP 24.0 (H) 2020    SED 72 (H) 2015        Preop Vitals    BP Readings from Last 3 Encounters:   20 107/65   20 106/68   20 108/60    Pulse Readings from Last 3 Encounters:   20 69   20 61   20 66      Resp Readings from Last 3 Encounters:   20 16   20 16   20 15    SpO2 Readings from Last 3 Encounters:   20 99%   20 96%   20 98%      Temp Readings from Last 1  "Encounters:   12/26/20 36.9  C (98.4  F) (Oral)    Ht Readings from Last 1 Encounters:   12/26/20 1.092 m (3' 7\")      Wt Readings from Last 1 Encounters:   12/25/20 56.7 kg (125 lb)    Estimated body mass index is 47.53 kg/m  as calculated from the following:    Height as of 12/26/20: 1.092 m (3' 7\").    Weight as of 12/25/20: 56.7 kg (125 lb).     LDA:  Suprapubic Catheter Double-lumen;Latex 14 fr suprapubic tube exchange (Active)   Number of days: 90        Past Medical History:   Diagnosis Date     Acid reflux disease      Amputation above knee (H)     bilateral     Benign essential hypertension      Blind left eye     due to central retinal artery occlusion     CAD (coronary artery disease)     UA and inferior MI in 2016 s/p PCI     Chronic back pain      Chronic neck pain      Chronic pain syndrome     with fentanyl intrathecal pain pump     Complex sleep apnea syndrome      COPD (chronic obstructive pulmonary disease) (H)      Dysphagia     chronic due to esophageal strictures and multiple previous dilitations      ROGER (generalized anxiety disorder)      History of blood transfusion     x5; no adverse reactions     History of central retinal artery occlusion 2006    left-sided     History of stroke     residual left-sided weakness     Hx of carotid artery stenosis     s/p left carotid stent in 2006 and balloon angioplasty in 2016     MDD (major depressive disorder)      Neurogenic bladder     SPT in place     JOSE (obstructive sleep apnea)      Osteoporosis      PAD (peripheral artery disease) (H)      Peripheral neuropathy      Person who has had sex change operation     male to female     Seizure disorder (H)     many years since last grand mal; daily, brief petit mals     Sickle cell trait (H)      Type 2 diabetes mellitus (H)       Past Surgical History:   Procedure Laterality Date     AMPUTATE LEG ABOVE KNEE Left 6/11/2016    Procedure: AMPUTATE LEG ABOVE KNEE;  Surgeon: Mello Rodriguez MD;  [facility]     AMPUTATE LEG BELOW KNEE Right 11/7/2016    Procedure: AMPUTATE LEG BELOW KNEE;  Surgeon: Savannah Durant MD;  [facility]     AMPUTATE REVISION STUMP LOWER EXTREMITY Right 11/11/2016    Procedure: AMPUTATE REVISION STUMP LOWER EXTREMITY;  Surgeon: Savannah Durant MD;  [facility]     AMPUTATE REVISION STUMP LOWER EXTREMITY Right 11/16/2016    Procedure: AMPUTATE REVISION STUMP LOWER EXTREMITY;  Surgeon: Savannah Durant MD;  [facility]     AMPUTATE TOE(S) Right 1/5/2016    Procedure: AMPUTATE TOE(S);  Surgeon: Mello Gaines DPM;  Location: [facility]     ANGIOGRAM Bilateral 11/21/2014    Procedure: ANGIOGRAM;  Surgeon: Savannah Durant MD;  [facility]     ANGIOGRAM Left 1/16/2015    Procedure: ANGIOGRAM;  Surgeon: Savannah Durant MD;  [facility]     ANGIOGRAM Bilateral 9/14/2015    Procedure: ANGIOGRAM;  Surgeon: Savannah Durant MD;  [facility]     ANGIOGRAM Left 10/12/2015    Procedure: ANGIOGRAM;  Surgeon: Savannah Durant MD;  [facility]     ANGIOGRAM Right 6/6/2016    Procedure: ANGIOGRAM;  Surgeon: Savannah Durant MD;  [facility]     ANGIOPLASTY Right 6/6/2016    Procedure: ANGIOPLASTY;  Surgeon: Savannah Durant MD;  [facility]     APPENDECTOMY       BREAST BIOPSY, RT/LT Right     benign     CATARACT IOL, RT/LT Right      CHOLECYSTECTOMY       COLONOSCOPY N/A 8/25/2014    Procedure: COLONOSCOPY;  Surgeon: Mello Ferrer MD;  [facility]     COLONOSCOPY WITH CO2 INSUFFLATION N/A 8/20/2014    Procedure: COLONOSCOPY WITH CO2 INSUFFLATION;  Surgeon: Duane, William Charles, MD;  Location: [facility]     CYSTOSCOPY, INJECT BOTOX N/A 5/21/2020    Procedure: CYSTOSCOPY, WITH BOTULINUM TOXIN INJECTION;  Surgeon: Loki Gordon MD;  [facility]     CYSTOSCOPY, INJECT BOTOX N/A 10/8/2020    Procedure: CYSTOSCOPY, INJECT BOTOX INTO BLADDER, SUPRAPUBIC TUBE EXCHNAGE;  Surgeon: Loki Gordon MD;  [facility]     CYSTOSCOPY, INTRAVESICAL INJECTION N/A 10/31/2019     Procedure: CYSTOSCOPY, BOTOX INJECTION, Suprapubic Catheter Exchange;  Surgeon: Loki Gordon MD;  [facility]     CYSTOSTOMY, INSERT TUBE SUPRAPUBIC, COMBINED N/A 1/16/2018    Procedure: COMBINED CYSTOSTOMY, INSERT TUBE SUPRAPUBIC;  Cystoscopy, Intraoperative Ultrasound, Suprapubic Tube Placement;  Surgeon: Keanu Dawson MD;  [facility]     ENDARTERECTOMY FEMORAL  5/23/2014    Procedure: ENDARTERECTOMY FEMORAL;  Surgeon: Jason Joshi MD;  [facility]     ESOPHAGOSCOPY, GASTROSCOPY, DUODENOSCOPY (EGD), COMBINED  12/14/2012    Procedure: COMBINED ESOPHAGOSCOPY, GASTROSCOPY, DUODENOSCOPY (EGD), BIOPSY SINGLE OR MULTIPLE;  ESOPHAGOSCOPY, GASTROSCOPY, DUODENOSCOPY (EGD), DILATATION ;  Surgeon: Elizabeth Stevenson MD;  Location:  [facility]     ESOPHAGOSCOPY, GASTROSCOPY, DUODENOSCOPY (EGD), COMBINED  12/31/2013    Procedure: COMBINED ESOPHAGOSCOPY, GASTROSCOPY, DUODENOSCOPY (EGD), BIOPSY SINGLE OR MULTIPLE;;  Surgeon: Clemente Lopez MD;  Location:  [facility]     ESOPHAGOSCOPY, GASTROSCOPY, DUODENOSCOPY (EGD), COMBINED  4/1/2014    Procedure: COMBINED ESOPHAGOSCOPY, GASTROSCOPY, DUODENOSCOPY (EGD);;  Surgeon: Clemente Lopez MD;  Location:  [facility]     ESOPHAGOSCOPY, GASTROSCOPY, DUODENOSCOPY (EGD), COMBINED  6/28/2014    Procedure: COMBINED ESOPHAGOSCOPY, GASTROSCOPY, DUODENOSCOPY (EGD);  Surgeon: Clemente Lopez MD;  [facility]     ESOPHAGOSCOPY, GASTROSCOPY, DUODENOSCOPY (EGD), COMBINED N/A 8/20/2014    Procedure: COMBINED ESOPHAGOSCOPY, GASTROSCOPY, DUODENOSCOPY (EGD), BIOPSY SINGLE OR MULTIPLE;  Surgeon: Duane, William Charles, MD;  Location:  OR     ESOPHAGOSCOPY, GASTROSCOPY, DUODENOSCOPY (EGD), COMBINED N/A 8/22/2014    Procedure: COMBINED ESOPHAGOSCOPY, GASTROSCOPY, DUODENOSCOPY (EGD), BIOPSY SINGLE OR MULTIPLE;  Surgeon: Mello Ferrer MD;  [facility]     ESOPHAGOSCOPY, GASTROSCOPY, DUODENOSCOPY (EGD), COMBINED N/A 10/2/2014    Procedure: COMBINED ESOPHAGOSCOPY,  GASTROSCOPY, DUODENOSCOPY (EGD), BIOPSY SINGLE OR MULTIPLE;  Surgeon: Remy Haskins MD;  [facility]     ESOPHAGOSCOPY, GASTROSCOPY, DUODENOSCOPY (EGD), COMBINED Left 12/15/2014    Procedure: COMBINED ESOPHAGOSCOPY, GASTROSCOPY, DUODENOSCOPY (EGD), BIOPSY SINGLE OR MULTIPLE;  Surgeon: Remy Haskins MD;  [facility]     ESOPHAGOSCOPY, GASTROSCOPY, DUODENOSCOPY (EGD), COMBINED N/A 2/25/2015    Procedure: COMBINED ENDOSCOPIC ULTRASOUND, ESOPHAGOSCOPY, GASTROSCOPY, DUODENOSCOPY (EGD), FINE NEEDLE ASPIRATE/BIOPSY;  Surgeon: Clemente Lugo MD;  [facility]     ESOPHAGOSCOPY, GASTROSCOPY, DUODENOSCOPY (EGD), COMBINED Left 2/25/2015    Procedure: COMBINED ESOPHAGOSCOPY, GASTROSCOPY, DUODENOSCOPY (EGD), BIOPSY SINGLE OR MULTIPLE;  Surgeon: Clemente Lugo MD;  [facility]     ESOPHAGOSCOPY, GASTROSCOPY, DUODENOSCOPY (EGD), COMBINED N/A 9/25/2016    Procedure: COMBINED ESOPHAGOSCOPY, GASTROSCOPY, DUODENOSCOPY (EGD);  Surgeon: Aziza Patiño MD;  [facility]     ESOPHAGOSCOPY, GASTROSCOPY, DUODENOSCOPY (EGD), COMBINED N/A 1/18/2017    Procedure: COMBINED ESOPHAGOSCOPY, GASTROSCOPY, DUODENOSCOPY (EGD), BIOPSY SINGLE OR MULTIPLE;  Surgeon: Clemente Lopez MD;  [facility]     ESOPHAGOSCOPY, GASTROSCOPY, DUODENOSCOPY (EGD), COMBINED N/A 11/26/2017    Procedure: COMBINED ESOPHAGOSCOPY, GASTROSCOPY, DUODENOSCOPY (EGD), REMOVE FOREIGN BODY;  Esophagogastroduodenoscopy with foreign body extraction  ;  Surgeon: Herberth Castrejon MD;  [facility]     ESOPHAGOSCOPY, GASTROSCOPY, DUODENOSCOPY (EGD), COMBINED N/A 11/26/2017    Procedure: COMBINED ESOPHAGOSCOPY, GASTROSCOPY, DUODENOSCOPY (EGD), REMOVE FOREIGN BODY;;  Surgeon: Herberht Castrejon MD;  [facility]     ESOPHAGOSCOPY, GASTROSCOPY, DUODENOSCOPY (EGD), COMBINED N/A 4/10/2020    Procedure: ESOPHAGOGASTRODUODENOSCOPY (EGD);  Surgeon: Yael Cabral MD;  [facility]     FASCIOTOMY LOWER EXTREMITY Left  "6/10/2016    Procedure: FASCIOTOMY LOWER EXTREMITY;  Surgeon: Mello Rodriguez MD;  [facility]     HC CAPSULE ENDOSCOPY N/A 8/25/2014    Procedure: CAPSULE/PILL CAM ENDOSCOPY;  Surgeon: Remy Haskins MD;  [facility]     HC CAPSULE ENDOSCOPY N/A 10/2/2014    Procedure: CAPSULE/PILL CAM ENDOSCOPY;  Surgeon: Remy Haskins MD;  [facility]     INJECT BLOCK MEDIAL BRANCH CERVICAL/THORACIC/LUMBAR Left 10/30/2020    Procedure: Cervical 3, 4, and 5 Medial Branch Block;  Surgeon: Evelyn Lima MD;  Location: [facility] OR     ORTHOPEDIC SURGERY      broken wrist repair     REVISE CATHETER INTRATHECAL  08/24/2020     SEX TRANSFORMATION SURGERY, MALE TO FEMALE  1974 1974     SINUS SURGERY      cyst removed     TONSILLECTOMY       VASCULAR SURGERY  2006    Left carotid stent      Allergies   Allergen Reactions     Bee Venom Anaphylaxis     Penicillins Anaphylaxis     Dilantin [Phenytoin] Other (See Comments)     Generic dilantin only per pt     Iodine Hives     Novocaine [Procaine] Hives     Tositumomab Unknown        Anesthesia Evaluation     . Pt has had prior anesthetic. Type: General    No history of anesthetic complications          ROS/MED HX    ENT/Pulmonary: Comment: [place] - uses \"Pocket talker\" to enhance hearing.      (+)sleep apnea, other ENT (Wears corrective lenses.  Cannot see out of left eye.  Limited right eye vision with no peripheral vision. Legally blind. )- tobacco use (Quit 15 years ago.  Smoked 1-2 PPD for 30 years.), Past use Moderate Persistent asthma Last exacerbation: > 1year ago,Treatment: Inhaled steroids and Nebulizer daily,  COPD, uses CPAP , . .    Neurologic:     (+)neuropathy - fingers, seizures last seizure:  Petit Mal 4-5  time per day.  Grand Mal>15 years ago. CVA (Cerebral vascular dz,s/p stent to left carotid artery 2005.) date: 2005, 2007 & 2008 with deficits- Presented with left side weakness and vision loss.    ,     Cardiovascular: Comment: PVD and " "thrombosis of LLE, s/p left AKA 6/6/2016   PVD RLE, s/p right AKA 11/23/2016.        (+) hypertension--CAD, -past MI (Inferior MI 9/2016.),-stent (s/p COLLEEN to pRCA and mRCA),9/2016   2 Drug Eluting Stent .. Taking blood thinners Pt has received instructions: . CHF (Plavix started September 2016 post stent placement.   ASA daily.) etiology: diastolic heart failure Last EF: 55-60% date: 9/2016 . . :. . Previous cardiac testing Echodate:results:date: results:ECG reviewed date: results:Cath date: results:          METS/Exercise Tolerance: Comment: Patient lives in assisted living.  Is able to dress herself, but does need help with showering. 1 - Eating, dressing   Hematologic:     (+) History of blood clots (Left LE  prior to AKA.) pt is not anticoagulated, Anemia, History of Transfusion previous transfusion reaction - \"hives\" 2012, -      Musculoskeletal: Comment: DDD  Chronic low back pain  (+) arthritis (hands),  -       GI/Hepatic: Comment: Chronic dysphagia   Esophageal strictures and previous dilations.  Coming for food impaction    (+) GERD Asymptomatic on medication,      Acute appendicitis: s/p appendectomy. Cholecystitis/cholelithiasis: s/p cholecystectomy.   Renal/Genitourinary:     (+) Other Renal/ Genitourinary, Frequent UTI's      Endo:     (+) type II DM Not using insulin - not using insulin pump Normal glucose range: 's not previously admitted for DM/DKA Obesity, .      Psychiatric:     (+) psychiatric history anxiety, depression and other (comment) (Schizoaffective disorder)      Infectious Disease:  - neg infectious disease ROS       Malignancy:      - no malignancy   Other: Comment: Chronic pain from neuropathy and PVD  Intrathecal pump with bupivacaine and fentanyl located in right lower back.   (+) No chance of pregnancy C-spine cleared: N/A, H/O Chronic Pain,H/O chronic opiod use , other significant disability Wheelchair bound                       PHYSICAL EXAM:   Mental Status/Neuro: Age " Appropriate   Airway: Facies: Feasible  Mallampati: III  Mouth/Opening: Full  TM distance: > 6 cm  Neck ROM: Full   Respiratory:   Resp. Effort: Normal      CV:   Edema: None   Comments:      Dental: Endentulous                Assessment:   ASA SCORE: 4    H&P: History and physical reviewed and following examination; no interval change.   Smoking Status:  Active Smoker   NPO Status: NPO Appropriate     Plan:   Anes. Type:  MAC   Pre-Medication: None   Induction:  IV (Standard)   Airway: Native Airway   Access/Monitoring: PIV   Maintenance: N/a     Postop Plan:   Postop Pain: None  Postop Sedation/Airway: Not planned  Disposition: Outpatient     PONV Management: Adult Risk Factors: Female   Prevention: Ondansetron, Dexamethasone     CONSENT: Direct conversation   Plan and risks discussed with: Patient   Blood Products: Consent Deferred (Minimal Blood Loss)                   Navneet Solis MD

## 2021-01-07 NOTE — OP NOTE
Urology Operative Note 1/7/21     Pre-operative diagnosis: Overactive bladder      Post-operative diagnosis: Overactive bladder      Procedure: Cystourethroscopy with injection of botulinum toxin A-300 units      Surgeon: Loki Gordon MD   Assistant(s): Lei Del Toro MD; Denise Alexander MD      Anesthesia: MAC        Estimated blood loss: Less than 5 ml      Complications: None      Condition: Patient taken to recovery in stable condition.      Indications: Sonya Foote is a 70 year old female with functional incontinence due to bilateral AKAs and overactive bladder. She manages her bladder with a suprapubic tube. She has medical contraindications to both anticholinergics and myrbetriq. She understands the risks and benefits of the various options and elects to proceed with botulinum toxin injection understanding the risks for urinary obstruction, infection, pain, bleeding, need for future procedures and risks of anesthesia.     Procedure: Sonya Foote was taken to the operating room in her usual state of health. She was positioned in Patient Positioning: Lithotomy. Her genitalia were prepped and draped in the standard fashion. A time-out was performed to ensure proper patient, procedure and positioning. The patient received appropriate IV antibiotics prior to the procedure based on pre-operative urine culture.     The procedure was initiated with insertion of a rigid Alm[place]arez ystoscope via urethra. The bladder mucosa appeared to be normal without stones, tumors or diverticulum on 360 degree inspection. Cysto findings included: normal mucosa     We mixed 3 vials of 100 U botulinum toxin A into 10 cc's of injectable saline for a total of 300 U. Bladder injection: We performed a template injection procedure into the bladder wall with a total of 15 injection sites. We then emptied the bladder to re-inspect our injection sites and found that there was a minimal amount of blood. The patient was returned to supine position and  transported to recovery in stable condition.     Plan: Schedule another Botox injection for 6 months.    Lei Del Toro MD  PGY-2 Urology

## 2021-01-08 ENCOUNTER — TELEPHONE (OUTPATIENT)
Dept: NEUROSURGERY | Facility: CLINIC | Age: 71
End: 2021-01-08

## 2021-01-08 ENCOUNTER — PRE VISIT (OUTPATIENT)
Dept: NEUROSURGERY | Facility: CLINIC | Age: 71
End: 2021-01-08

## 2021-01-08 NOTE — PROGRESS NOTES
Sonya is a 70 year old who is being evaluated via a billable telephone visit.   Patient does not have the technical resources/capability to accommodate a virtual video visit, therefore elected to proceed with telephone visit    This is a video/telephone visit conducted [facility] on non-urgent clinic visits due to risk of the spread of COVID-19 pandemic virus. The patient did not identify any urgent or red-flag symptoms that would require in-person evaluation.       What phone number would you like to be contacted at? [phone]  How would you like to obtain your AVS? MAIL    Phone call duration: approx 16 minutes    Reason for Visit:              Hospital Follow Up - Neck Pain following syncope/Fall      History of Presenting Illness:           Sonya Foote is a 70 year old female admitted on 12/25/2020. She has a PMH of seizure disorder, hx of CAD w/ inferior MI in 2016 s/p PCI, CVA, HTN, DM2, GERD with previous strictures and dilation, COPD, chronic dyspnea, JOSE, ROGER, MDD, DJD, fibromyalgia, osteoporosis, anemia, chronic pain syndrome, and neurogenic bladder, bilateral above knee amputations, chronic neuropathy,Schizophrenia, chronic neck pain, epilepsy (petit mal almost daily; last grand mal seizure 10-15 years ago) who presented to the ED via EMS from congregate care facility after a fall, reporting head, neck, upper back, left shoulder and left hip pain.     Patient reported feeling thirsty, turned in bed to try to get a glass of water, felt somewhat dizzy, and then fell out of the bed. She recalls prior lightheadedness, dark vision, palpitations. Admits to head trauma and LOC. Though in the ED denied LOC; however currently states she had a period of being unaware of what was going on. Landed on the ground between the nightstand and a nearby heater.  In doing so she reports hitting the left side of her head on the ground as well as her left hip and shoulder  which took the brunt of this fall. Denies loss of bowel and bladder control; no tongue biting. Neck pain worse than baseline. Patient brought in by EMS (C-collar applied upon arrival to ED),  NSG consulted. Patient was placed in [place] Collar.      Today,   She is at home.   She is having some neck stiffness.  But no significant neck pain    No new radiating pain, n/t in arms.   W/c bound  She has upcoming therapies scheduled.   Assistance with ADLs    Current Outpatient Medications   Medication     ACETAMINOPHEN EXTRA STRENGTH 500 MG tablet     ADVAIR DISKUS 250-50 MCG/DOSE inhaler     albuterol (PROAIR HFA/PROVENTIL HFA/VENTOLIN HFA) 108 (90 Base) MCG/ACT inhaler     albuterol (PROVENTIL) (2.5 MG/3ML) 0.083% neb solution     alendronate (FOSAMAX) 70 MG tablet     ASPERCREME LIDOCAINE 4 % Patch     ASPIRIN LOW DOSE 81 MG chewable tablet     atorvastatin (LIPITOR) 40 MG tablet     brimonidine (ALPHAGAN) 0.2 % ophthalmic solution     diclofenac (VOLTAREN) 1 % topical gel     dorzolamide (TRUSOPT) 2 % ophthalmic solution     EPINEPHrine (ANY BX GENERIC EQUIV) 0.3 MG/0.3ML injection 2-pack     escitalopram (LEXAPRO) 10 MG tablet     FEROSUL 325 (65 Fe) MG tablet     fluticasone (FLONASE) 50 MCG/ACT nasal spray     INCRUSE ELLIPTA 62.5 MCG/INH Inhaler     lactulose (CHRONULAC) 10 GM/15ML solution     latanoprost (XALATAN) 0.005 % ophthalmic solution     levETIRAcetam (KEPPRA) 500 MG tablet     lisinopril (ZESTRIL) 20 MG tablet     loratadine (CLARITIN) 10 MG tablet     Medication given by intrathecal pump     Menthol, Topical Analgesic, 5 % GEL     metFORMIN (GLUCOPHAGE) 500 MG tablet     metoprolol succinate ER (TOPROL-XL) 50 MG 24 hr tablet     nicotine (NICODERM CQ) 14 MG/24HR 24 hr patch     nitroGLYcerin (NITROSTAT) 0.4 MG sublingual tablet     Nutritional Supplements (ENSURE) LIQD     pantoprazole (PROTONIX) 40 MG EC tablet     phenytoin (DILANTIN) 100 MG capsule     polyethylene glycol (MIRALAX) 17 GM/SCOOP powder      pregabalin (LYRICA) 150 MG capsule     risperiDONE (RISPERDAL) 0.5 MG tablet     SENEXON-S 8.6-50 MG tablet     solifenacin (VESICARE) 10 MG tablet     traZODone (DESYREL) 150 MG tablet     vitamin D3 (CHOLECALCIFEROL) 10 MCG (400 UNIT) capsule     Vitamin D3 (VITAMIN D) 10 MCG (400 UNIT) tablet     No current facility-administered medications for this visit.        Examination:   There were no vitals taken for this visit.    Neurological Assessment:    Telephone examination  General    Alert, cooperative.  No acute distress  Pulmonary:   Breathing comfortably No cough, or shortness of breath  Speech is fluent    IMAGING:  Exam: 2 views of the cervical spine 1/5/2021.     Comparison: CT cervical spine 12/25/2020.        Findings: AP and lateral views of the cervical spine were obtained.  Normal cervical spine alignment. Mild loss of intervertebral disc  height at C4-5 and C6-7. Additional multilevel mild degenerative  changes with uncinate hypertrophy, endplate osteophytosis, and facet  hypertrophy. No prevertebral edema.     Atherosclerotic calcification of both carotid bifurcations. Left  carotid stent.                                                                    Impression: Mild cervical spondylosis most pronounced at C4-5 and  C6-7, unchanged.     JONAH YODER MD      Assessment:   ~Neck pain following fall, improved.      PLAN   ~Okay to taper/wean out of cervical collar   (Reviewed with patient)   ~Referral to physical therapy for neck strengthening, stretching, and increased range of motion   ~We reviewed activity going forward and gradual lifting of restrictions.  ~Patient told to clinic if develops new neck pain, that is constant and not relieved by Tylenol/Ibuprofen or rest, or develops new radiating pain, numbness/tingling, or weakness in arm(s)   ~Unless the patient develops new pain or symptoms, she does not require any further imaging, and will be discharged from the Neurosurgery Clinic at  this time, and can return on an as-needed basis.     At the end of the visit, all the patient's questions and concerns had been addressed and the patient was agreeable with the plan of care as outlined above. The patient has our office contact information at [phone], and knows to call with any questions, concerns, or changes in condition.        Dr. Shabnam Boyer  Nurse Practitioner, Doctor of Nursing Practice  Neurosurgery Spine Department   [place], [place]  [phone]

## 2021-01-12 ENCOUNTER — VIRTUAL VISIT (OUTPATIENT)
Dept: NEUROSURGERY | Facility: CLINIC | Age: 71
End: 2021-01-12
Payer: COMMERCIAL

## 2021-01-12 DIAGNOSIS — M54.2 MUSCULOSKELETAL NECK PAIN: ICD-10-CM

## 2021-01-12 DIAGNOSIS — J44.9 CHRONIC OBSTRUCTIVE PULMONARY DISEASE, UNSPECIFIED COPD TYPE (H): ICD-10-CM

## 2021-01-12 DIAGNOSIS — Z93.59 OTHER CYSTOSTOMY STATUS (H): ICD-10-CM

## 2021-01-12 DIAGNOSIS — M54.2 CHRONIC NECK PAIN: Primary | ICD-10-CM

## 2021-01-12 DIAGNOSIS — F25.9 SCHIZOAFFECTIVE DISORDER, UNSPECIFIED TYPE (H): ICD-10-CM

## 2021-01-12 DIAGNOSIS — R42 DIZZINESS: ICD-10-CM

## 2021-01-12 DIAGNOSIS — Z89.611 S/P AKA (ABOVE KNEE AMPUTATION) BILATERAL (H): ICD-10-CM

## 2021-01-12 DIAGNOSIS — I50.22 CHRONIC SYSTOLIC HEART FAILURE (H): ICD-10-CM

## 2021-01-12 DIAGNOSIS — G40.909 SEIZURE DISORDER (H): ICD-10-CM

## 2021-01-12 DIAGNOSIS — D57.3: ICD-10-CM

## 2021-01-12 DIAGNOSIS — E11.51 TYPE 2 DIABETES MELLITUS WITH DIABETIC PERIPHERAL ANGIOPATHY WITHOUT GANGRENE, WITHOUT LONG-TERM CURRENT USE OF INSULIN (H): ICD-10-CM

## 2021-01-12 DIAGNOSIS — E66.01 MORBID OBESITY (H): ICD-10-CM

## 2021-01-12 DIAGNOSIS — Z89.612 S/P AKA (ABOVE KNEE AMPUTATION) BILATERAL (H): ICD-10-CM

## 2021-01-12 DIAGNOSIS — G89.29 CHRONIC NECK PAIN: Primary | ICD-10-CM

## 2021-01-12 DIAGNOSIS — L89.132: ICD-10-CM

## 2021-01-12 DIAGNOSIS — E11.59 TYPE 2 DIABETES MELLITUS WITH OTHER CIRCULATORY COMPLICATION, WITHOUT LONG-TERM CURRENT USE OF INSULIN (H): ICD-10-CM

## 2021-01-12 PROCEDURE — 99212 OFFICE O/P EST SF 10 MIN: CPT | Mod: 95 | Performed by: NURSE PRACTITIONER

## 2021-01-12 NOTE — PATIENT INSTRUCTIONS
~Okay to taper/wean out of cervical collar   (Reviewed with patient)   ~Referral to physical therapy for neck strengthening, stretching, and increased range of motion   ~We reviewed activity going forward and gradual lifting of restrictions.  ~Patient told to clinic if develops new neck pain, that is constant and not relieved by Tylenol/Ibuprofen or rest, or develops new radiating pain, numbness/tingling, or weakness in arm(s)   ~Unless the patient develops new pain or symptoms, she does not require any further imaging, and will be discharged from the Neurosurgery Clinic at this time, and can return on an as-needed basis.     
Statement Selected

## 2021-01-12 NOTE — LETTER
1/8/2021        RE: Sonya Foote  [address]  [place] [address]     Dear Colleague,    Thank you for referring your patient, Sonya Foote, to the [facility] [place] [facility]. Please see a copy of my visit note below.    Sonya is a 70 year old who is being evaluated via a billable telephone visit.   Patient does not have the technical resources/capability to accommodate a virtual video visit, therefore elected to proceed with telephone visit    This is a video/telephone visit conducted due to [facility] restrictions on non-urgent clinic visits due to risk of the spread of COVID-19 pandemic virus. The patient did not identify any urgent or red-flag symptoms that would require in-person evaluation.       What phone number would you like to be contacted at? [phone]  How would you like to obtain your AVS? MAIL    Phone call duration: approx 16 minutes    Reason for Visit:              Hospital Follow Up - Neck Pain following syncope/Fall      History of Presenting Illness:           Sonya Foote is a 70 year old female admitted on 12/25/2020. She has a PMH of seizure disorder, hx of CAD w/ inferior MI in 2016 s/p PCI, CVA, HTN, DM2, GERD with previous strictures and dilation, COPD, chronic dyspnea, JOSE, ROGER, MDD, DJD, fibromyalgia, osteoporosis, anemia, chronic pain syndrome, and neurogenic bladder, bilateral above knee amputations, chronic neuropathy,Schizophrenia, chronic neck pain, epilepsy (petit mal almost daily; last grand mal seizure 10-15 years ago) who presented to the ED via EMS from congregate care facility after a fall, reporting head, neck, upper back, left shoulder and left hip pain.     Patient reported feeling thirsty, turned in bed to try to get a glass of water, felt somewhat dizzy, and then fell out of the bed. She recalls prior lightheadedness, dark vision, palpitations.  Admits to head trauma and LOC. Though in the ED denied LOC; however currently states she had a period of being unaware of what was going on. Landed on the ground between the nightstand and a nearby heater.  In doing so she reports hitting the left side of her head on the ground as well as her left hip and shoulder which took the brunt of this fall. Denies loss of bowel and bladder control; no tongue biting. Neck pain worse than baseline. Patient brought in by EMS (C-collar applied upon arrival to ED),  NSG consulted. Patient was placed in [place] Collar.      Today,   She is at home.   She is having some neck stiffness.  But no significant neck pain    No new radiating pain, n/t in arms.   W/c bound  She has upcoming therapies scheduled.   Assistance with ADLs    Current Outpatient Medications   Medication     ACETAMINOPHEN EXTRA STRENGTH 500 MG tablet     ADVAIR DISKUS 250-50 MCG/DOSE inhaler     albuterol (PROAIR HFA/PROVENTIL HFA/VENTOLIN HFA) 108 (90 Base) MCG/ACT inhaler     albuterol (PROVENTIL) (2.5 MG/3ML) 0.083% neb solution     alendronate (FOSAMAX) 70 MG tablet     ASPERCREME LIDOCAINE 4 % Patch     ASPIRIN LOW DOSE 81 MG chewable tablet     atorvastatin (LIPITOR) 40 MG tablet     brimonidine (ALPHAGAN) 0.2 % ophthalmic solution     diclofenac (VOLTAREN) 1 % topical gel     dorzolamide (TRUSOPT) 2 % ophthalmic solution     EPINEPHrine (ANY BX GENERIC EQUIV) 0.3 MG/0.3ML injection 2-pack     escitalopram (LEXAPRO) 10 MG tablet     FEROSUL 325 (65 Fe) MG tablet     fluticasone (FLONASE) 50 MCG/ACT nasal spray     INCRUSE ELLIPTA 62.5 MCG/INH Inhaler     lactulose (CHRONULAC) 10 GM/15ML solution     latanoprost (XALATAN) 0.005 % ophthalmic solution     levETIRAcetam (KEPPRA) 500 MG tablet     lisinopril (ZESTRIL) 20 MG tablet     loratadine (CLARITIN) 10 MG tablet     Medication given by intrathecal pump     Menthol, Topical Analgesic, 5 % GEL     metFORMIN (GLUCOPHAGE) 500 MG tablet     metoprolol succinate  ER (TOPROL-XL) 50 MG 24 hr tablet     nicotine (NICODERM CQ) 14 MG/24HR 24 hr patch     nitroGLYcerin (NITROSTAT) 0.4 MG sublingual tablet     Nutritional Supplements (ENSURE) LIQD     pantoprazole (PROTONIX) 40 MG EC tablet     phenytoin (DILANTIN) 100 MG capsule     polyethylene glycol (MIRALAX) 17 GM/SCOOP powder     pregabalin (LYRICA) 150 MG capsule     risperiDONE (RISPERDAL) 0.5 MG tablet     SENEXON-S 8.6-50 MG tablet     solifenacin (VESICARE) 10 MG tablet     traZODone (DESYREL) 150 MG tablet     vitamin D3 (CHOLECALCIFEROL) 10 MCG (400 UNIT) capsule     Vitamin D3 (VITAMIN D) 10 MCG (400 UNIT) tablet     No current facility-administered medications for this visit.        Examination:   There were no vitals taken for this visit.    Neurological Assessment:    Telephone examination  General    Alert, cooperative.  No acute distress  Pulmonary:   Breathing comfortably No cough, or shortness of breath  Speech is fluent    IMAGING:  Exam: 2 views of the cervical spine 1/5/2021.     Comparison: CT cervical spine 12/25/2020.        Findings: AP and lateral views of the cervical spine were obtained.  Normal cervical spine alignment. Mild loss of intervertebral disc  height at C4-5 and C6-7. Additional multilevel mild degenerative  changes with uncinate hypertrophy, endplate osteophytosis, and facet  hypertrophy. No prevertebral edema.     Atherosclerotic calcification of both carotid bifurcations. Left  carotid stent.                                                                    Impression: Mild cervical spondylosis most pronounced at C4-5 and  C6-7, unchanged.     JONAH YODER MD      Assessment:   ~Neck pain following fall, improved.      PLAN   ~Okay to taper/wean out of cervical collar   (Reviewed with patient)   ~Referral to physical therapy for neck strengthening, stretching, and increased range of motion   ~We reviewed activity going forward and gradual lifting of restrictions.  ~Patient told to  clinic if develops new neck pain, that is constant and not relieved by Tylenol/Ibuprofen or rest, or develops new radiating pain, numbness/tingling, or weakness in arm(s)   ~Unless the patient develops new pain or symptoms, she does not require any further imaging, and will be discharged from the Neurosurgery Clinic at this time, and can return on an as-needed basis.     At the end of the visit, all the patient's questions and concerns had been addressed and the patient was agreeable with the plan of care as outlined above. The patient has our office contact information at [phone], and knows to call with any questions, concerns, or changes in condition.        Dr. Shabnam Boyer  Nurse Practitioner, Doctor of Nursing Practice  Neurosurgery Spine Department   [facility], [place], [place]  [phone]

## 2021-01-13 DIAGNOSIS — N39.41 URGE INCONTINENCE OF URINE: ICD-10-CM

## 2021-01-13 DIAGNOSIS — N32.81 OVERACTIVE BLADDER: Primary | ICD-10-CM

## 2021-01-13 RX ORDER — CLINDAMYCIN PHOSPHATE 900 MG/50ML
900 INJECTION, SOLUTION INTRAVENOUS SEE ADMIN INSTRUCTIONS
Status: CANCELLED | OUTPATIENT
Start: 2021-01-13

## 2021-01-13 RX ORDER — CLINDAMYCIN PHOSPHATE 900 MG/50ML
900 INJECTION, SOLUTION INTRAVENOUS
Status: CANCELLED | OUTPATIENT
Start: 2021-01-13

## 2021-01-14 DIAGNOSIS — Z53.9 DIAGNOSIS NOT YET DEFINED: Primary | ICD-10-CM

## 2021-01-14 PROCEDURE — G0179 MD RECERTIFICATION HHA PT: HCPCS | Performed by: INTERNAL MEDICINE

## 2021-01-14 RX ORDER — RISPERIDONE 0.5 MG/1
TABLET ORAL
Qty: 28 TABLET | Refills: 11 | Status: SHIPPED | OUTPATIENT
Start: 2021-01-14

## 2021-01-14 NOTE — TELEPHONE ENCOUNTER
Failed protocol.  please route to  team if patient needs an appointment     Madison TAPIARN BSN  [facility]  [phone]

## 2021-01-16 ENCOUNTER — MEDICAL CORRESPONDENCE (OUTPATIENT)
Dept: HEALTH INFORMATION MANAGEMENT | Facility: CLINIC | Age: 71
End: 2021-01-16

## 2021-01-20 ENCOUNTER — OFFICE VISIT (OUTPATIENT)
Dept: INTERNAL MEDICINE | Facility: CLINIC | Age: 71
End: 2021-01-20
Payer: COMMERCIAL

## 2021-01-20 ENCOUNTER — ANCILLARY PROCEDURE (OUTPATIENT)
Dept: GENERAL RADIOLOGY | Facility: CLINIC | Age: 71
End: 2021-01-20
Attending: INTERNAL MEDICINE
Payer: COMMERCIAL

## 2021-01-20 VITALS
HEIGHT: 55 IN | SYSTOLIC BLOOD PRESSURE: 106 MMHG | BODY MASS INDEX: 28.93 KG/M2 | RESPIRATION RATE: 16 BRPM | WEIGHT: 125 LBS | OXYGEN SATURATION: 95 % | DIASTOLIC BLOOD PRESSURE: 66 MMHG | TEMPERATURE: 96.8 F | HEART RATE: 69 BPM

## 2021-01-20 DIAGNOSIS — E11.59 TYPE 2 DIABETES MELLITUS WITH OTHER CIRCULATORY COMPLICATION, WITHOUT LONG-TERM CURRENT USE OF INSULIN (H): ICD-10-CM

## 2021-01-20 DIAGNOSIS — M19.042 PRIMARY OSTEOARTHRITIS OF BOTH HANDS: ICD-10-CM

## 2021-01-20 DIAGNOSIS — M25.519 SHOULDER PAIN, UNSPECIFIED CHRONICITY, UNSPECIFIED LATERALITY: Primary | ICD-10-CM

## 2021-01-20 DIAGNOSIS — M19.041 PRIMARY OSTEOARTHRITIS OF BOTH HANDS: ICD-10-CM

## 2021-01-20 DIAGNOSIS — M25.519 SHOULDER PAIN, UNSPECIFIED CHRONICITY, UNSPECIFIED LATERALITY: ICD-10-CM

## 2021-01-20 PROCEDURE — 73030 X-RAY EXAM OF SHOULDER: CPT | Mod: RT | Performed by: RADIOLOGY

## 2021-01-20 PROCEDURE — 99214 OFFICE O/P EST MOD 30 MIN: CPT | Performed by: INTERNAL MEDICINE

## 2021-01-20 ASSESSMENT — MIFFLIN-ST. JEOR: SCORE: 733.63

## 2021-01-20 NOTE — PROGRESS NOTES
Assessment & Plan     Shoulder pain, unspecified chronicity, unspecified laterality  Peers to be more soft tissue.  Will image to rule out occult fracture and consider orthopedic evaluation pending results  - Orthopedic & Spine  Referral; Future  - XR Shoulder Right G/E 3 Views; Future    Primary osteoarthritis of both hands  Refilled per patient request as she states she is using 3 times a day and it helps her hands.  - diclofenac (VOLTAREN) 1 % topical gel; APPLY 2 GRAMS TOPICALLY TO AFFECTED AREA(S) THREE TIMES A DAY    Type 2 diabetes mellitus with other circulatory complication, without long-term current use of insulin (H)  Lab Results   Component Value Date    A1C 5.1 12/26/2020    A1C 5.0 08/19/2020    A1C 5.0 12/16/2019    A1C 5.0 04/17/2019    A1C 5.1 06/06/2018     Relatively well controlled.  Patient is quite fixated on the fact that she needs a dietary recommendation for her current living situation.  She is asked to be seen by the diabetic educators and dietitians.  - AMBULATORY ADULT DIABETES EDUCATOR REFERRAL; Future      30 minutes spent on the date of the encounter doing chart review, interpretation of tests, patient visit and documentation        See Patient Instructions    Return in about 1 month (around 2/20/2021) for diabetes educator referral, f/u Orthopedics.    Allan Casey MD  [facility]    Chito Hassan is a 70 year old who presents to clinic today for the following health issues     HPI       Musculoskeletal problem/pain    Patient states that she fell sometime after the 25th of last month when she had an x-ray done of her left shoulder.  Since then she has had pain apparently in her right shoulder.  Onset/Duration: Weeks   Description  Location: shoulders - R>L   Joint Swelling: no  Redness: no  Pain: YES  Warmth: no  Intensity:  moderate  Progression of Symptoms:  worsening  Accompanying signs and symptoms:   Fevers:  "no  Numbness/tingling/weakness: YES  History  Trauma to the area: YES- fell about 2 weeks ago, landed on right side of body   Recent illness:  no  Previous similar problem: No   Previous evaluation:  no  Precipitating or alleviating factors:  Aggravating factors include: lifting and movement of arm   Therapies tried and outcome: nothing      Other concerns:  1. Patient requesting referral to see dietician, states she has problems swallowing following stroke and multiple food allergies wants a more clear-cut dietary recommendation for her current place of living.      Review of Systems   CONSTITUTIONAL: NEGATIVE for fever, chills, change in weight  EYES: NEGATIVE for vision changes or irritation  ENT/MOUTH: NEGATIVE for ear, mouth and throat problems  RESP: NEGATIVE for significant cough or SOB  CV: NEGATIVE for chest pain, palpitations or peripheral edema  GI: NEGATIVE for nausea, abdominal pain, heartburn, or change in bowel habits  : NEGATIVE for frequency, dysuria, or hematuria  HEME: NEGATIVE for bleeding problems  PSYCHIATRIC: NEGATIVE for changes in mood or affect      Objective    /66   Pulse 69   Temp 96.8  F (36  C) (Temporal)   Resp 16   Ht 1.092 m (3' 7\")   Wt 56.7 kg (125 lb)   SpO2 95%   BMI 47.53 kg/m    Body mass index is 47.53 kg/m .  Physical Exam   GENERAL: alert and no distress using a walking stick in addition to her electric wheelchair.  EYES: Eyes grossly normal to inspection, PERRL and conjunctivae and sclerae normal  HENT: ear canals and TM's normal, nose and mouth without ulcers or lesions  NECK: no adenopathy, no asymmetry, masses, or scars and thyroid normal to palpation  RESP: lungs clear to auscultation - no rales, rhonchi or wheezes  CV: regular rate and rhythm, normal S1 S2, no S3 or S4, no murmur, click or rub, no peripheral edema and peripheral pulses strong  MS: mild decreased ROM right shoulder, noted bilateral LE amputations  NEURO: No focal changes  PSYCH: " mentation appears normal, affect normal/bright

## 2021-01-21 NOTE — TELEPHONE ENCOUNTER
RECORDS RECEIVED FROM: Shoulder pain, unspecified chronicity, unspecified laterality / Allan Casey MD in OX / XR / [facility] [facility]   DATE RECEIVED: Feb 17, 2021   NOTES STATUS DETAILS   OFFICE NOTE from referring provider Internal    OFFICE NOTE from other specialist N/A    DISCHARGE SUMMARY from hospital N/A    DISCHARGE REPORT from the ER N/A    OPERATIVE REPORT N/A    MEDICATION LIST Internal    IMPLANT RECORD/STICKER N/A    LABS     CBC/DIFF N/A    CULTURES N/A    INJECTIONS DONE IN RADIOLOGY N/A    MRI N/A    CT SCAN N/A    XRAYS (IMAGES & REPORTS) Internal    TUMOR     PATHOLOGY  Slides & report N/A    01/21/21   11:41 AM   Complete  Sanjuana Solano CMA

## 2021-01-27 ENCOUNTER — RECORDS - HEALTHEAST (OUTPATIENT)
Dept: LAB | Facility: CLINIC | Age: 71
End: 2021-01-27

## 2021-01-27 DIAGNOSIS — G89.4 CHRONIC PAIN SYNDROME: ICD-10-CM

## 2021-01-29 RX ORDER — PREGABALIN 150 MG/1
CAPSULE ORAL
Qty: 90 CAPSULE | Refills: 4 | Status: SHIPPED | OUTPATIENT
Start: 2021-01-29

## 2021-01-29 NOTE — CONFIDENTIAL NOTE
Lyrica        Last written prescription date: 7.28.20        Last fill quantity: 90, # refills: 4        Last office visit: 1.20.21        Future office visit: N/A             Routing refill request to provider for review/approval because: Drug not on the [facility], P or [facility] refill protocol or controlled substance

## 2021-02-01 ENCOUNTER — TELEPHONE (OUTPATIENT)
Dept: INTERNAL MEDICINE | Facility: CLINIC | Age: 71
End: 2021-02-01

## 2021-02-01 DIAGNOSIS — R13.10 DYSPHAGIA, UNSPECIFIED TYPE: Primary | ICD-10-CM

## 2021-02-01 NOTE — TELEPHONE ENCOUNTER
Reason for Call:  Other Swallowing Issues    Detailed comments: Patient is calling in today requesting that he see someone for Swallowing and that he is choking on all of his food. Patient is wondering if he can get a referral for Speech Therapy? Thank you    Phone Number Patient can be reached at: Home number on file [phone] (home)    Best Time: anytime    Can we leave a detailed message on this number? YES    Call taken on 2/1/2021 at 5:14 PM by Mary Arriaga

## 2021-02-03 ENCOUNTER — MEDICAL CORRESPONDENCE (OUTPATIENT)
Dept: HEALTH INFORMATION MANAGEMENT | Facility: CLINIC | Age: 71
End: 2021-02-03

## 2021-02-04 ENCOUNTER — HOSPITAL ENCOUNTER (OUTPATIENT)
Dept: PHYSICAL THERAPY | Facility: CLINIC | Age: 71
Setting detail: THERAPIES SERIES
End: 2021-02-04
Attending: INTERNAL MEDICINE
Payer: COMMERCIAL

## 2021-02-04 ENCOUNTER — NURSE TRIAGE (OUTPATIENT)
Dept: INTERNAL MEDICINE | Facility: CLINIC | Age: 71
End: 2021-02-04

## 2021-02-04 PROCEDURE — 97530 THERAPEUTIC ACTIVITIES: CPT | Mod: GP | Performed by: PHYSICAL THERAPIST

## 2021-02-04 NOTE — TELEPHONE ENCOUNTER
Can we leave a detailed message on this number? YES    Call taken on 2/4/2021 at 1:30 PM by Chavez Elam    Reason for Call: Request for an order or referral:    Order or referral being requested: Xray of left leg    Date needed: as soon as possible    Has the patient been seen by the PCP for this problem? NO    Additional comments: Patient went to PT today and complained of left leg pain. Patient says she fell a few weeks ago and PT & patient would like for an xray to be ordered to make sure everything is okay.      Phone number Patient can be reached at:  Cell number on file:    Telephone Information:   Mobile [phone]       Best Time:  Anytime    Can we leave a detailed message on this number?  YES    Call taken on 2/4/2021 at 1:33 PM by Chavez Elam

## 2021-02-04 NOTE — TELEPHONE ENCOUNTER
Messages nonspecific.  Request is for x-ray of left leg but there are no details as to where on the left leg the pain is.  Is the x-ray defined needed in the hip, femur, knee, tib-fib or ankle, foot?   please verify and place orders for cosign thank you

## 2021-02-05 ENCOUNTER — APPOINTMENT (OUTPATIENT)
Dept: ULTRASOUND IMAGING | Facility: CLINIC | Age: 71
End: 2021-02-05
Attending: EMERGENCY MEDICINE
Payer: COMMERCIAL

## 2021-02-05 ENCOUNTER — HOSPITAL ENCOUNTER (EMERGENCY)
Facility: CLINIC | Age: 71
Discharge: HOME | End: 2021-02-05
Attending: EMERGENCY MEDICINE | Admitting: EMERGENCY MEDICINE
Payer: COMMERCIAL

## 2021-02-05 ENCOUNTER — APPOINTMENT (OUTPATIENT)
Dept: GENERAL RADIOLOGY | Facility: CLINIC | Age: 71
End: 2021-02-05
Attending: EMERGENCY MEDICINE
Payer: COMMERCIAL

## 2021-02-05 VITALS
BODY MASS INDEX: 22.15 KG/M2 | OXYGEN SATURATION: 97 % | DIASTOLIC BLOOD PRESSURE: 73 MMHG | HEART RATE: 57 BPM | HEIGHT: 63 IN | RESPIRATION RATE: 16 BRPM | WEIGHT: 125 LBS | SYSTOLIC BLOOD PRESSURE: 120 MMHG

## 2021-02-05 DIAGNOSIS — M79.662 PAIN OF LEFT LOWER LEG: ICD-10-CM

## 2021-02-05 PROCEDURE — 250N000013 HC RX MED GY IP 250 OP 250 PS 637: Performed by: EMERGENCY MEDICINE

## 2021-02-05 PROCEDURE — 93971 EXTREMITY STUDY: CPT | Mod: 26 | Performed by: RADIOLOGY

## 2021-02-05 PROCEDURE — 99284 EMERGENCY DEPT VISIT MOD MDM: CPT | Mod: 25 | Performed by: EMERGENCY MEDICINE

## 2021-02-05 PROCEDURE — 73552 X-RAY EXAM OF FEMUR 2/>: CPT | Mod: 26 | Performed by: RADIOLOGY

## 2021-02-05 PROCEDURE — 99283 EMERGENCY DEPT VISIT LOW MDM: CPT | Performed by: EMERGENCY MEDICINE

## 2021-02-05 PROCEDURE — 93971 EXTREMITY STUDY: CPT | Mod: LT

## 2021-02-05 PROCEDURE — 73552 X-RAY EXAM OF FEMUR 2/>: CPT | Mod: LT

## 2021-02-05 RX ORDER — IBUPROFEN 600 MG/1
600 TABLET, FILM COATED ORAL ONCE
Status: COMPLETED | OUTPATIENT
Start: 2021-02-05 | End: 2021-02-05

## 2021-02-05 RX ORDER — ACETAMINOPHEN 325 MG/1
975 TABLET ORAL ONCE
Status: COMPLETED | OUTPATIENT
Start: 2021-02-05 | End: 2021-02-05

## 2021-02-05 RX ORDER — GABAPENTIN 100 MG/1
100 CAPSULE ORAL ONCE
Status: COMPLETED | OUTPATIENT
Start: 2021-02-05 | End: 2021-02-05

## 2021-02-05 RX ORDER — GABAPENTIN 100 MG/1
100 CAPSULE ORAL 3 TIMES DAILY
Qty: 60 CAPSULE | Refills: 0 | Status: SHIPPED | OUTPATIENT
Start: 2021-02-05 | End: 2021-07-20

## 2021-02-05 RX ADMIN — IBUPROFEN 600 MG: 600 TABLET ORAL at 15:35

## 2021-02-05 RX ADMIN — GABAPENTIN 100 MG: 100 CAPSULE ORAL at 16:34

## 2021-02-05 RX ADMIN — ACETAMINOPHEN 975 MG: 325 TABLET, FILM COATED ORAL at 15:34

## 2021-02-05 ASSESSMENT — ENCOUNTER SYMPTOMS
ARTHRALGIAS: 1
MYALGIAS: 1

## 2021-02-05 ASSESSMENT — MIFFLIN-ST. JEOR: SCORE: 1046.13

## 2021-02-05 NOTE — ED PROVIDER NOTES
I am is had pain in her left stump.ED Provider Note  [facility]      History     Chief Complaint   Patient presents with     Leg Pain     HPI  Sonya Foote is a 71 year old female who has a history of chronic bilateral above-the-knee amputations.  She reports that about 3 weeks ago she fell out of her wheelchair and has had pain in her left residual femur.  No changes in the appearance of the skin or the stump.    Past Medical History  Past Medical History:   Diagnosis Date     Acid reflux disease      Amputation above knee (H)     bilateral     Benign essential hypertension      Blind left eye     due to central retinal artery occlusion     CAD (coronary artery disease)     UA and inferior MI in 2016 s/p PCI     Chronic back pain      Chronic neck pain      Chronic pain syndrome     with fentanyl intrathecal pain pump     Complex sleep apnea syndrome      COPD (chronic obstructive pulmonary disease) (H)      Dysphagia     chronic due to esophageal strictures and multiple previous dilitations      ROGER (generalized anxiety disorder)      History of blood transfusion     x5; no adverse reactions     History of central retinal artery occlusion 2006    left-sided     History of stroke     residual left-sided weakness     Hx of carotid artery stenosis     s/p left carotid stent in 2006 and balloon angioplasty in 2016     MDD (major depressive disorder)      Neurogenic bladder     SPT in place     JOSE (obstructive sleep apnea)      Osteoporosis      PAD (peripheral artery disease) (H)      Peripheral neuropathy      Person who has had sex change operation     male to female     Seizure disorder (H)     many years since last grand mal; daily, brief petit mals     Sickle cell trait (H)      Type 2 diabetes mellitus (H)      Past Surgical History:   Procedure Laterality Date     AMPUTATE LEG ABOVE KNEE Left 6/11/2016    Procedure: AMPUTATE LEG ABOVE KNEE;  Surgeon: Mello Rodriguez MD;   [facility]     AMPUTATE LEG BELOW KNEE Right 11/7/2016    Procedure: AMPUTATE LEG BELOW KNEE;  Surgeon: Savannah Durant MD;  [facility]     AMPUTATE REVISION STUMP LOWER EXTREMITY Right 11/11/2016    Procedure: AMPUTATE REVISION STUMP LOWER EXTREMITY;  Surgeon: Savannah Durant MD;  [facility]     AMPUTATE REVISION STUMP LOWER EXTREMITY Right 11/16/2016    Procedure: AMPUTATE REVISION STUMP LOWER EXTREMITY;  Surgeon: Savannah Durant MD;  [facility]     AMPUTATE TOE(S) Right 1/5/2016    Procedure: AMPUTATE TOE(S);  Surgeon: Mello Gaines DPM;  Location: [facility]     ANGIOGRAM Bilateral 11/21/2014    Procedure: ANGIOGRAM;  Surgeon: Savannah Durant MD;  [facility]     ANGIOGRAM Left 1/16/2015    Procedure: ANGIOGRAM;  Surgeon: Savannah Durant MD;  [facility]     ANGIOGRAM Bilateral 9/14/2015    Procedure: ANGIOGRAM;  Surgeon: Savannah Durant MD;  [facility]     ANGIOGRAM Left 10/12/2015    Procedure: ANGIOGRAM;  Surgeon: Savannah Durant MD;  [facility]     ANGIOGRAM Right 6/6/2016    Procedure: ANGIOGRAM;  Surgeon: Savannah Durant MD;  [facility]     ANGIOPLASTY Right 6/6/2016    Procedure: ANGIOPLASTY;  Surgeon: Savannah Durant MD;  [facility]     APPENDECTOMY       BREAST BIOPSY, RT/LT Right     benign     CATARACT IOL, RT/LT Right      CHOLECYSTECTOMY       COLONOSCOPY N/A 8/25/2014    Procedure: COLONOSCOPY;  Surgeon: Mello Ferrer MD;  [facility]     COLONOSCOPY WITH CO2 INSUFFLATION N/A 8/20/2014    Procedure: COLONOSCOPY WITH CO2 INSUFFLATION;  Surgeon: Duane, William Charles, MD;  Location: [facility]     CYSTOSCOPY, INJECT BOTOX N/A 5/21/2020    Procedure: CYSTOSCOPY, WITH BOTULINUM TOXIN INJECTION;  Surgeon: Loki Gordon MD;  [facility]     CYSTOSCOPY, INJECT BOTOX N/A 10/8/2020    Procedure: CYSTOSCOPY, INJECT BOTOX INTO BLADDER, SUPRAPUBIC TUBE EXCHNAGE;  Surgeon: Loki Gordon MD;  [facility]     CYSTOSCOPY, INJECT BOTOX N/A 1/7/2021     Procedure: CYSTOSCOPY, WITH BOTULINUM TOXIN INJECTION;  Surgeon: Loki Gordon MD;  [facility]     CYSTOSCOPY, INTRAVESICAL INJECTION N/A 10/31/2019    Procedure: CYSTOSCOPY, BOTOX INJECTION, Suprapubic Catheter Exchange;  Surgeon: Loki Gordon MD;  [facility]     CYSTOSTOMY, INSERT TUBE SUPRAPUBIC, COMBINED N/A 1/16/2018    Procedure: COMBINED CYSTOSTOMY, INSERT TUBE SUPRAPUBIC;  Cystoscopy, Intraoperative Ultrasound, Suprapubic Tube Placement;  Surgeon: Keanu Dawson MD;  [facility]     ENDARTERECTOMY FEMORAL  5/23/2014    Procedure: ENDARTERECTOMY FEMORAL;  Surgeon: Jason Joshi MD;  [facility]     ESOPHAGOSCOPY, GASTROSCOPY, DUODENOSCOPY (EGD), COMBINED  12/14/2012    Procedure: COMBINED ESOPHAGOSCOPY, GASTROSCOPY, DUODENOSCOPY (EGD), BIOPSY SINGLE OR MULTIPLE;  ESOPHAGOSCOPY, GASTROSCOPY, DUODENOSCOPY (EGD), DILATATION ;  Surgeon: Elizabeth Stevenson MD;  Location:  GI     ESOPHAGOSCOPY, GASTROSCOPY, DUODENOSCOPY (EGD), COMBINED  12/31/2013    Procedure: COMBINED ESOPHAGOSCOPY, GASTROSCOPY, DUODENOSCOPY (EGD), BIOPSY SINGLE OR MULTIPLE;;  Surgeon: Clemente Lopez MD;  [facility]     ESOPHAGOSCOPY, GASTROSCOPY, DUODENOSCOPY (EGD), COMBINED  4/1/2014    Procedure: COMBINED ESOPHAGOSCOPY, GASTROSCOPY, DUODENOSCOPY (EGD);;  Surgeon: Clemente Lopez MD;  [facility]     ESOPHAGOSCOPY, GASTROSCOPY, DUODENOSCOPY (EGD), COMBINED  6/28/2014    Procedure: COMBINED ESOPHAGOSCOPY, GASTROSCOPY, DUODENOSCOPY (EGD);  Surgeon: Clemente Lopez MD;  [facility]     ESOPHAGOSCOPY, GASTROSCOPY, DUODENOSCOPY (EGD), COMBINED N/A 8/20/2014    Procedure: COMBINED ESOPHAGOSCOPY, GASTROSCOPY, DUODENOSCOPY (EGD), BIOPSY SINGLE OR MULTIPLE;  Surgeon: Duane, William Charles, MD;  Location:  OR     ESOPHAGOSCOPY, GASTROSCOPY, DUODENOSCOPY (EGD), COMBINED N/A 8/22/2014    Procedure: COMBINED ESOPHAGOSCOPY, GASTROSCOPY, DUODENOSCOPY (EGD), BIOPSY SINGLE OR MULTIPLE;   Surgeon: Mello Ferrer MD;  [facility]     ESOPHAGOSCOPY, GASTROSCOPY, DUODENOSCOPY (EGD), COMBINED N/A 10/2/2014    Procedure: COMBINED ESOPHAGOSCOPY, GASTROSCOPY, DUODENOSCOPY (EGD), BIOPSY SINGLE OR MULTIPLE;  Surgeon: Remy Haskins MD;  [facility]     ESOPHAGOSCOPY, GASTROSCOPY, DUODENOSCOPY (EGD), COMBINED Left 12/15/2014    Procedure: COMBINED ESOPHAGOSCOPY, GASTROSCOPY, DUODENOSCOPY (EGD), BIOPSY SINGLE OR MULTIPLE;  Surgeon: Remy Haskins MD;  [facility]     ESOPHAGOSCOPY, GASTROSCOPY, DUODENOSCOPY (EGD), COMBINED N/A 2/25/2015    Procedure: COMBINED ENDOSCOPIC ULTRASOUND, ESOPHAGOSCOPY, GASTROSCOPY, DUODENOSCOPY (EGD), FINE NEEDLE ASPIRATE/BIOPSY;  Surgeon: Clemente Lugo MD;  [facility]     ESOPHAGOSCOPY, GASTROSCOPY, DUODENOSCOPY (EGD), COMBINED Left 2/25/2015    Procedure: COMBINED ESOPHAGOSCOPY, GASTROSCOPY, DUODENOSCOPY (EGD), BIOPSY SINGLE OR MULTIPLE;  Surgeon: Clemente Lugo MD;  [facility]     ESOPHAGOSCOPY, GASTROSCOPY, DUODENOSCOPY (EGD), COMBINED N/A 9/25/2016    Procedure: COMBINED ESOPHAGOSCOPY, GASTROSCOPY, DUODENOSCOPY (EGD);  Surgeon: Aziza Patiño MD;  [facility]     ESOPHAGOSCOPY, GASTROSCOPY, DUODENOSCOPY (EGD), COMBINED N/A 1/18/2017    Procedure: COMBINED ESOPHAGOSCOPY, GASTROSCOPY, DUODENOSCOPY (EGD), BIOPSY SINGLE OR MULTIPLE;  Surgeon: Clemente Lopez MD;  [facility]     ESOPHAGOSCOPY, GASTROSCOPY, DUODENOSCOPY (EGD), COMBINED N/A 11/26/2017    Procedure: COMBINED ESOPHAGOSCOPY, GASTROSCOPY, DUODENOSCOPY (EGD), REMOVE FOREIGN BODY;  Esophagogastroduodenoscopy with foreign body extraction  ;  Surgeon: Herberth Castrejon MD;  [facility]     ESOPHAGOSCOPY, GASTROSCOPY, DUODENOSCOPY (EGD), COMBINED N/A 11/26/2017    Procedure: COMBINED ESOPHAGOSCOPY, GASTROSCOPY, DUODENOSCOPY (EGD), REMOVE FOREIGN BODY;;  Surgeon: Herberth Castrejon MD;  Location:  GI     ESOPHAGOSCOPY, GASTROSCOPY, DUODENOSCOPY (EGD),  COMBINED N/A 4/10/2020    Procedure: ESOPHAGOGASTRODUODENOSCOPY (EGD);  Surgeon: Yael Cabral MD;  [facility]     FASCIOTOMY LOWER EXTREMITY Left 6/10/2016    Procedure: FASCIOTOMY LOWER EXTREMITY;  Surgeon: Mello Rodriguez MD;  [facility]     HC CAPSULE ENDOSCOPY N/A 8/25/2014    Procedure: CAPSULE/PILL CAM ENDOSCOPY;  Surgeon: Remy Haskins MD;  [facility]     HC CAPSULE ENDOSCOPY N/A 10/2/2014    Procedure: CAPSULE/PILL CAM ENDOSCOPY;  Surgeon: Remy Haskins MD;  [facility]     INJECT BLOCK MEDIAL BRANCH CERVICAL/THORACIC/LUMBAR Left 10/30/2020    Procedure: Cervical 3, 4, and 5 Medial Branch Block;  Surgeon: Evelyn Lima MD;  Location: [facility] OR     ORTHOPEDIC SURGERY      broken wrist repair     REVISE CATHETER INTRATHECAL  08/24/2020     SEX TRANSFORMATION SURGERY, MALE TO FEMALE  1974 1974     SINUS SURGERY      cyst removed     TONSILLECTOMY       VASCULAR SURGERY  2006    Left carotid stent          gabapentin (NEURONTIN) 100 MG capsule       ACETAMINOPHEN EXTRA STRENGTH 500 MG tablet       ADVAIR DISKUS 250-50 MCG/DOSE inhaler       albuterol (PROAIR HFA/PROVENTIL HFA/VENTOLIN HFA) 108 (90 Base) MCG/ACT inhaler       albuterol (PROVENTIL) (2.5 MG/3ML) 0.083% neb solution       alendronate (FOSAMAX) 70 MG tablet       ASPERCREME LIDOCAINE 4 % Patch       ASPIRIN LOW DOSE 81 MG chewable tablet       atorvastatin (LIPITOR) 40 MG tablet       blood glucose monitoring (ONE TOUCH ULTRA 2) meter device kit       blood glucose monitoring (ONE TOUCH ULTRA) test strip       blood glucose monitoring (ONE TOUCH ULTRASOFT) lancets       brimonidine (ALPHAGAN) 0.2 % ophthalmic solution       capsaicin-menthol-methyl sal 0.025-1-12 % external cream       diclofenac (VOLTAREN) 1 % topical gel       dorzolamide (TRUSOPT) 2 % ophthalmic solution       EPINEPHrine (ANY BX GENERIC EQUIV) 0.3 MG/0.3ML injection 2-pack       escitalopram (LEXAPRO) 10 MG tablet       FEROSUL 325  (65 Fe) MG tablet       fluticasone (FLONASE) 50 MCG/ACT nasal spray       INCRUSE ELLIPTA 62.5 MCG/INH Inhaler       lactulose (CHRONULAC) 10 GM/15ML solution       latanoprost (XALATAN) 0.005 % ophthalmic solution       levETIRAcetam (KEPPRA) 500 MG tablet       lisinopril (ZESTRIL) 20 MG tablet       loratadine (CLARITIN) 10 MG tablet       Medication given by intrathecal pump       Menthol, Topical Analgesic, 5 % GEL       metFORMIN (GLUCOPHAGE) 500 MG tablet       metoprolol succinate ER (TOPROL-XL) 50 MG 24 hr tablet       Multiple Vitamins-Minerals (TAB-A-MACY) TABS       multivitamin, therapeutic (THERA-VIT) TABS tablet       mupirocin (BACTROBAN) 2 % external ointment       nicotine (NICODERM CQ) 14 MG/24HR 24 hr patch       nitroGLYcerin (NITROSTAT) 0.4 MG sublingual tablet       Nutritional Supplements (ENSURE) LIQD       oxyCODONE (OXY-IR) 5 MG capsule       pantoprazole (PROTONIX) 40 MG EC tablet       phenytoin (DILANTIN) 100 MG capsule       polyethylene glycol (MIRALAX) 17 GM/SCOOP powder       pregabalin (LYRICA) 150 MG capsule       risperiDONE (RISPERDAL) 0.5 MG tablet       SENEXON-S 8.6-50 MG tablet       solifenacin (VESICARE) 10 MG tablet       traZODone (DESYREL) 150 MG tablet       vitamin D3 (CHOLECALCIFEROL) 10 MCG (400 UNIT) capsule       Vitamin D3 (VITAMIN D) 10 MCG (400 UNIT) tablet      Allergies   Allergen Reactions     Bee Venom Anaphylaxis     Penicillins Anaphylaxis     Dilantin [Phenytoin] Other (See Comments)     Generic dilantin only per pt     Iodine Hives     Novocaine [Procaine] Hives     Tositumomab Unknown     Family History  Family History   Problem Relation Age of Onset     Dementia Mother      Diabetes Mother         type unknown     Coronary Artery Disease Mother         MI     Glaucoma Father      Diabetes Father         type unknown     Heart Failure Father      Schizophrenia Brother      Depression Brother      Suicide Sister      Depression Sister      Diabetes  "Sister      Ovarian Cancer Maternal Aunt      Leukemia Maternal Aunt      Diabetes Maternal Grandmother      Diabetes Maternal Grandfather      Diabetes Paternal Grandmother      Diabetes Paternal Grandfather      Breast Cancer Sister      Cerebrovascular Disease Brother      Colon Cancer No family hx of      Macular Degeneration No family hx of      Social History   Social History     Tobacco Use     Smoking status: Current Every Day Smoker     Packs/day: 1.00     Years: 30.00     Pack years: 30.00     Types: Cigarettes, Cigars     Smokeless tobacco: Never Used     Tobacco comment: 1.5 packs per day    Substance Use Topics     Alcohol use: No     Alcohol/week: 0.0 standard drinks     Drug use: No      Past medical history, past surgical history, medications, allergies, family history, and social history were reviewed with the patient. No additional pertinent items.       Review of Systems   Musculoskeletal: Positive for arthralgias and myalgias.   Skin: Negative.    All other systems reviewed and are negative.    A complete review of systems was performed with pertinent positives and negatives noted in the HPI, and all other systems negative.    Physical Exam   BP: 101/50  Pulse: 63  Resp: 16  Height: 160 cm (5' 3\")  Weight: 56.7 kg (125 lb)  SpO2: 96 %  Physical Exam  Vitals signs and nursing note reviewed.   Constitutional:       General: She is not in acute distress.     Appearance: Normal appearance.   Cardiovascular:      Rate and Rhythm: Normal rate and regular rhythm.   Pulmonary:      Effort: Pulmonary effort is normal.      Breath sounds: Normal breath sounds.   Musculoskeletal:         General: No swelling, tenderness or deformity.      Left lower leg: No edema.   Skin:     General: Skin is warm and dry.      Findings: No rash.   Neurological:      Mental Status: She is alert and oriented to person, place, and time.   Psychiatric:         Mood and Affect: Mood normal.         Behavior: Behavior normal. "         ED Course      Procedures             Results for orders placed or performed during the hospital encounter of 02/05/21   XR Femur Left 2 Views     Status: None    Narrative    2 views left femur radiographs 2/5/2021 3:54 PM    History: pain after fall 3 wks ago/ Amp     Comparison: None    Findings:    AP and cross table lateral views of the left femur were obtained.     No acute osseous abnormality.      Degenerative changes of the left hip.    Left above-the-knee amputation. Vascular calcification. Vascular  stents.         Impression    Impression:    No acute osseous abnormality.    ANDRA SEGOVIA MD (Joe)   US Lower Extremity Venous Duplex Left     Status: None    Narrative    EXAMINATION: DOPPLER VENOUS ULTRASOUND OF THE LEFT LOWER EXTREMITY,  2/5/2021 3:36 PM     COMPARISON: None.    HISTORY: left LE pain    TECHNIQUE:  Gray-scale evaluation with compression, spectral flow, and  color Doppler assessment of the deep venous system of the left leg  from groin to knee, and then at the ankle.    FINDINGS:  In the left lower extremity, the common femoral and femoral veins  demonstrate normal compressibility and blood flow. Right common  femoral vein evaluated for comparison and demonstrates normal flow and  compressibility. Bilateral below-the-knee amputation. No significant  soft tissue edema visualized.      Impression    IMPRESSION:  No evidence left lower extremity deep venous thrombosis.    I have personally reviewed the examination and initial interpretation  and I agree with the findings.    DEBRA TRIMBLE MD     Medications   gabapentin (NEURONTIN) capsule 100 mg (has no administration in time range)   acetaminophen (TYLENOL) tablet 975 mg (975 mg Oral Given 2/5/21 1534)   ibuprofen (ADVIL/MOTRIN) tablet 600 mg (600 mg Oral Given 2/5/21 1535)        Assessments & Plan (with Medical Decision Making)   Patient complains of 3 weeks of leg pain primarily on the left she has bilateral  above-the-knee amputations.  She says it started when she fell out of her wheelchair 3 weeks ago I x-rayed her which was negative I also did an ultrasound which is negative for DVT.  There is no acute abnormality I will start her on gabapentin will defer to the primary care provider for continuation of this if she derives benefit.    I have reviewed the nursing notes. I have reviewed the findings, diagnosis, plan and need for follow up with the patient.    New Prescriptions    GABAPENTIN (NEURONTIN) 100 MG CAPSULE    Take 1 capsule (100 mg) by mouth 3 times daily       Final diagnoses:   Pain of left lower leg       --  Bj Kenyon MD  [facility] EMERGENCY DEPARTMENT  2/5/2021     Bj Kenyon MD  02/05/21 [id number]

## 2021-02-05 NOTE — ED NOTES
Pt presents to ER for left leg pain after falling off her wheelchair at the assisted living. Pt states that she has a significant history of blood clots. Pt states that she had both lower legs amputated due to blood clots. Pt denies any CP or SOB. Pt breathing even and non labored. Pt skin warm, dry and intact. Will continue to monitor.

## 2021-02-05 NOTE — TELEPHONE ENCOUNTER
"Patient called back. Fell a few weeks ago (after last appt with Dr. Casey) and leg has hurt ever since. Left leg. Wears prosthetics and cannot put them on. Cannot stand up, very painful. In so much pain. Her therapist told her she thinks it could be fractured. She does not think therapist saw any obvious deformities. Unsure if swollen. Patient is legally blind. She has a hard time knowing/describing where it hurts. Hurts above knee, does not have a knee. Above the knee amputee. Thigh hurts, front and back.  Unsure if hip hurts. Just says it is \"painfull all the way down\". cannot lay down right. Hurts in wheelchair, hurts in bed. Did not hit head or lose conscioussness or have any other known injuries with fall. She does not have any way of coming to get xray today or going to urgent care but would be willing to take ambulance to ER if needed. She also thinks maybe they could arrange xray for her at her [facility]- nurse station # [phone].    Please advise. Should patient to present to ER via ambulance now knowing additional details?        "

## 2021-02-05 NOTE — ED TRIAGE NOTES
Pt BIBA from nursing home with complaints of L upper leg pain. Pt bilateral BKA's. Pt fell 2-3 weeks ago, unclear if pt was seen after that fall. A&O vitals stable.

## 2021-02-05 NOTE — DISCHARGE INSTRUCTIONS
The x-ray of your left leg is normal there is no bony injury and the ultrasound shows no blood clot.  You can start gabapentin as directed, your doctor can increase the dose after a couple of days.  Follow-up with your primary care provider

## 2021-02-05 NOTE — TELEPHONE ENCOUNTER
Left message for patient to call back and speak to triage.    Nurse station informed and she will inform patient, knows she should go by ambulance.

## 2021-02-05 NOTE — TELEPHONE ENCOUNTER
Vida Diaz CMA contacted Sonya on 02/05/21 and left a message. If patient calls back please find out which part of left leg is in Pain.

## 2021-02-05 NOTE — TELEPHONE ENCOUNTER
Difficult to assess this patient as does have chronic pain issues.  If the pain is that severe and risk of fracture is noted patient should be seen in the ER for further assessment.

## 2021-02-05 NOTE — ED NOTES
Bed: [facility]  Expected date: 2/5/21  Expected time: 1:23 PM  Means of arrival: Ambulance  [facility]    72 y[facility] Female    Fall 2 weeks ago  Bilateral LE Amputee  C/O femur pain

## 2021-02-10 ENCOUNTER — VIRTUAL VISIT (OUTPATIENT)
Dept: PALLIATIVE CARE | Facility: CLINIC | Age: 71
End: 2021-02-10
Attending: INTERNAL MEDICINE
Payer: COMMERCIAL

## 2021-02-10 DIAGNOSIS — Z71.89 ADVANCE CARE PLANNING: ICD-10-CM

## 2021-02-10 DIAGNOSIS — J44.9 CHRONIC OBSTRUCTIVE PULMONARY DISEASE, UNSPECIFIED COPD TYPE (H): Primary | ICD-10-CM

## 2021-02-10 PROCEDURE — 99213 OFFICE O/P EST LOW 20 MIN: CPT | Mod: TEL | Performed by: INTERNAL MEDICINE

## 2021-02-10 PROCEDURE — 999N001193 HC VIDEO/TELEPHONE VISIT; NO CHARGE

## 2021-02-10 NOTE — PROGRESS NOTES
"Sonya is a 71 year old who is being evaluated via a billable telephone visit.      What phone number would you like to be contacted at? [phone]  How would you like to obtain your AVS? Mail a copy     Vitals - Patient Reported  Systolic (Patient Reported): 130  Diastolic (Patient Reported): 80  Weight (Patient Reported): 56.7 kg (125 lb)  Pain Score: Extreme Pain (8)  Pain Loc: Other - see comment(Entire body)    Ale [facility]        [facility]    (This note was transcribed using voice recognition software. While I review and edit the transcription, I may miss errors, and the software sometimes does unexpected capitalizations and formatting that I miss. Please let me know of any serious mistranscriptions and I will addend this note.)    Patient ID:  Medical - Comorbidities of chronic pain syndrome s/p IT pump ([facility]), COPD, DM2, HTN, CAD, hx strokes, PAD s/p bilat LE amputations AKAs, cervical spondylosis, tobacco use disorder; chart describes a cardiomyopathy but last echo normal/recovered; significant visual impairment/she is legally blind; suprapubic catheter.    Social - Per chart Sonya is a trans woman; she describes herself as a demarco woman to me. Lives alone in [place]. Close with her son.    Care Planning - Detailed goals, prognosis, code status discussed in 8/7/20 palliative note. Has a DNR, [id number] POLST. +HCD on chart.      History:  History gathered today from: patient, medical chart  PCP, neurosurgery notes, ED notes    She didn't seem to remember me or our visit 6 months ago, nor did she seem interested in talking today.    \"I\"m hanging in there\"  Fell out of chair and bruised her L stump and went to ED which is taking a long time to heal.  Shoulder 'out of joint' and is seeing an orthopedic surgeon soon.  More sob and seeing her pulmonologist soon.  Not coughing much.    Overall feels safe and well settled at her [facility].        PE: There were no vitals taken for this " visit.   Wt Readings from Last 3 Encounters:   02/05/21 56.7 kg (125 lb)   01/20/21 56.7 kg (125 lb)   01/07/21 56.7 kg (125 lb)     On the phone, patient sounds alert and in NAD.  Speaking full sentences, no cough, no noisy resps.  Affect full; pleasant; interactive; clear sounding sensorium; fluent speech; memory/thought processes/fund of knowledge all normal.       Data reviewed:  I reviewed recent labs and imaging, my comments:  Recent shoulder, femur Xrays nothing acute  PFTs last Sept nearly normal     database reviewed: n      Impression & Recommendations:  72 yo F with COPD, stroke, PAD s/p bilat AKAs, chronic pain  I met her 6 months ago and mostly addressed care planning, code status.  Overall she's been stable since.  She did not recall our meeting 6 months nor seem interested in talking with me today. I'll note I met her in person previously and today's visit was just telephone which intrinsically hinders meaningful discussions in my experience. She was hoping to leave soon for a shopping trip and asked to stop talking after I reviewed her recent medical history with her and discussed code status.    F/u prn      11 min on phone  25 minutes spent on the date of the encounter doing chart review, history and exam, documentation and further activities as noted above.    Thank you for involving us in the patient's care.   Clayton Guadarrama MD / Palliative Medicine / Text me via [facility].

## 2021-02-10 NOTE — LETTER
"2/10/2021       RE: Sonya Fooet  [address]  [place] [address]     Dear Colleague,    Thank you for referring your patient, Sonya Foote, [facility]. Please see a copy of my visit note below.    Sonya is a 71 year old who is being evaluated via a billable telephone visit.      What phone number would you like to be contacted at? [phone]  How would you like to obtain your AVS? Mail a copy     Vitals - Patient Reported  Systolic (Patient Reported): 130  Diastolic (Patient Reported): 80  Weight (Patient Reported): 56.7 kg (125 lb)  Pain Score: Extreme Pain (8)  Pain Loc: Other - see comment(Entire body)    [facility]        Palliative Care Outpatient Clinic    (This note was transcribed using voice recognition software. While I review and edit the transcription, I may miss errors, and the software sometimes does unexpected capitalizations and formatting that I miss. Please let me know of any serious mistranscriptions and I will addend this note.)    Patient ID:  Medical - Comorbidities of chronic pain syndrome s/p IT pump ([facility]), COPD, DM2, HTN, CAD, hx strokes, PAD s/p bilat LE amputations AKAs, cervical spondylosis, tobacco use disorder; chart describes a cardiomyopathy but last echo normal/recovered; significant visual impairment/she is legally blind; suprapubic catheter.    Social - Per chart Sonya is a trans woman; she describes herself as a demarco woman to me. Lives alone in [place]. Close with her son.    Care Planning - Detailed goals, prognosis, code status discussed in 8/7/20 palliative note. Has a DNR, [id number] POLST. +HCD on chart.      History:  History gathered today from: patient, medical chart  PCP, neurosurgery notes, ED notes    She didn't seem to remember me or our visit 6 months ago, nor did she seem interested in talking today.    \"I\"m hanging in there\"  Fell out of " chair and bruised her L stump and went to ED which is taking a long time to heal.  Shoulder 'out of joint' and is seeing an orthopedic surgeon soon.  More sob and seeing her pulmonologist soon.  Not coughing much.    Overall feels safe and well settled at her EZ.        PE: There were no vitals taken for this visit.   Wt Readings from Last 3 Encounters:   02/05/21 56.7 kg (125 lb)   01/20/21 56.7 kg (125 lb)   01/07/21 56.7 kg (125 lb)     On the phone, patient sounds alert and in NAD.  Speaking full sentences, no cough, no noisy resps.  Affect full; pleasant; interactive; clear sounding sensorium; fluent speech; memory/thought processes/fund of knowledge all normal.       Data reviewed:  I reviewed recent labs and imaging, my comments:  Recent shoulder, femur Xrays nothing acute  PFTs last Sept nearly normal     database reviewed: n      Impression & Recommendations:  72 yo F with COPD, stroke, PAD s/p bilat AKAs, chronic pain  I met her 6 months ago and mostly addressed care planning, code status.  Overall she's been stable since.  She did not recall our meeting 6 months nor seem interested in talking with me today. I'll note I met her in person previously and today's visit was just telephone which intrinsically hinders meaningful discussions in my experience. She was hoping to leave soon for a shopping trip and asked to stop talking after I reviewed her recent medical history with her and discussed code status.    F/u prn      11 min on phone  25 minutes spent on the date of the encounter doing chart review, history and exam, documentation and further activities as noted above.    Thank you for involving us in the patient's care.   Clayton Guadarrama MD / Palliative Medicine / Text me via [facility].          Again, thank you for allowing me to participate in the care of your patient.      Sincerely,    Clayton Guadarrama MD

## 2021-02-11 DIAGNOSIS — M25.511 RIGHT SHOULDER PAIN, UNSPECIFIED CHRONICITY: Primary | ICD-10-CM

## 2021-02-17 ENCOUNTER — ANCILLARY PROCEDURE (OUTPATIENT)
Dept: GENERAL RADIOLOGY | Facility: CLINIC | Age: 71
End: 2021-02-17
Attending: ORTHOPAEDIC SURGERY
Payer: COMMERCIAL

## 2021-02-17 ENCOUNTER — PRE VISIT (OUTPATIENT)
Dept: ORTHOPEDICS | Facility: CLINIC | Age: 71
End: 2021-02-17

## 2021-02-17 ENCOUNTER — TELEPHONE (OUTPATIENT)
Dept: INTERNAL MEDICINE | Facility: CLINIC | Age: 71
End: 2021-02-17

## 2021-02-17 ENCOUNTER — OFFICE VISIT (OUTPATIENT)
Dept: ORTHOPEDICS | Facility: CLINIC | Age: 71
End: 2021-02-17
Payer: COMMERCIAL

## 2021-02-17 VITALS — BODY MASS INDEX: 22.15 KG/M2 | WEIGHT: 125 LBS | HEIGHT: 63 IN

## 2021-02-17 DIAGNOSIS — M25.511 ARTHRALGIA OF RIGHT ACROMIOCLAVICULAR JOINT: ICD-10-CM

## 2021-02-17 DIAGNOSIS — M25.511 RIGHT SHOULDER PAIN, UNSPECIFIED CHRONICITY: ICD-10-CM

## 2021-02-17 DIAGNOSIS — M25.519 SHOULDER PAIN, UNSPECIFIED CHRONICITY, UNSPECIFIED LATERALITY: ICD-10-CM

## 2021-02-17 DIAGNOSIS — M75.51 SUBACROMIAL BURSITIS OF RIGHT SHOULDER JOINT: Primary | ICD-10-CM

## 2021-02-17 PROCEDURE — 99203 OFFICE O/P NEW LOW 30 MIN: CPT | Performed by: ORTHOPAEDIC SURGERY

## 2021-02-17 PROCEDURE — 20611 DRAIN/INJ JOINT/BURSA W/US: CPT | Mod: RT | Performed by: FAMILY MEDICINE

## 2021-02-17 PROCEDURE — 73030 X-RAY EXAM OF SHOULDER: CPT | Mod: RT | Performed by: RADIOLOGY

## 2021-02-17 PROCEDURE — 99207 PR DROP WITH A PROCEDURE: CPT | Performed by: FAMILY MEDICINE

## 2021-02-17 RX ADMIN — LIDOCAINE HYDROCHLORIDE 3 ML: 10 INJECTION, SOLUTION EPIDURAL; INFILTRATION; INTRACAUDAL; PERINEURAL at 10:55

## 2021-02-17 RX ADMIN — TRIAMCINOLONE ACETONIDE 20 MG: 40 INJECTION, SUSPENSION INTRA-ARTICULAR; INTRAMUSCULAR at 10:55

## 2021-02-17 RX ADMIN — TRIAMCINOLONE ACETONIDE 20 MG: 40 INJECTION, SUSPENSION INTRA-ARTICULAR; INTRAMUSCULAR at 12:16

## 2021-02-17 RX ADMIN — LIDOCAINE HYDROCHLORIDE 2 ML: 10 INJECTION, SOLUTION EPIDURAL; INFILTRATION; INTRACAUDAL; PERINEURAL at 12:16

## 2021-02-17 ASSESSMENT — MIFFLIN-ST. JEOR: SCORE: 1046

## 2021-02-17 NOTE — LETTER
2021         RE: Sonya Foote  [address]  [place] [address]        Dear Colleague,    Thank you for referring your patient, Sonya Foote, to the [facility]. Please see a copy of my visit note below.    Large Joint Injection/Arthocentesis: L subacromial bursa    Date/Time: 2021 10:55 AM  Performed by: Ronald Hernandez DO  Authorized by: Ronald Hernandez DO     Indications:  Pain  Needle Size:  22 G  Guidance: ultrasound    Approach:  Posterolateral  Location:  Shoulder   Location comment:  AND L AC Joint      Site:  L subacromial bursa  Medications:  3 mL lidocaine (PF) 1 %; 20 mg triamcinolone 40 MG/ML  Outcome:  Tolerated well, no immediate complications  Procedure discussed: discussed risks, benefits, and alternatives    Consent Given by:  Patient  Timeout: timeout called immediately prior to procedure    Prep: patient was prepped and draped in usual sterile fashion     Scribed by Allan Goodman ATC, ATC for Dr. Hernandez on 21 at 10:30AM, based on the provider's statements to me.            PROCEDURE ENCOUNTER    [facility]  Orthopedics  Ronald Hernandez DO  2021     Name: Sonya Foote  MRN: [id number]  Age: 71 year old  : 1950    Referring provider: Dr. Ariadna Mcneill MD  Diagnosis: AC joint pain of right shoulder.  Subacromial bursitis of right shoulder.    Subacromial Bursa - Ultrasound Guided  The patient was informed of the risks and the benefits of the procedure and a written consent was signed.  The patient s right shoulder was prepped with chlorhexidine in sterile fashion.   20 mg of triamcinolone suspension was drawn up into a 5 mL syringe with 4 mL of 1% lidocaine.  Injection was performed using sterile technique.  Under ultrasound guidance a 1.5-inch 22-gauge needle was used to enter the right subacromial bursa.  Anterolateral approach was used with arm held in Crass position.  Needle placement was visualized  "and documented with ultrasound.  Ultrasound visualization was necessary to ensure placement in to the bursa and not the rotator cuff tendon which could potentially cause further tendon damage.  Injection performed long axis to the probe.  Injection solution visualized within the joint space.  Images were permanently stored for the patient's record.  There were no complications. The patient tolerated the procedure well. There was negligible bleeding.   PROCEDURE #2    Right Acromioclavicular Injection - Ultrasound Guided  The patient was informed of the risks and the benefits of the procedure and a written consent was signed.  The patient s right acromioclavicular joint was prepped with chlorhexidine in sterile fashion.   20 mg of triamcinolone suspension was drawn up into a 3 mL syringe with 1 mL of 1% lidocaine.  Injection was performed using sterile technique.  Under ultrasound guidance a 1.5\" 22-gauge needle was used to enter the right acromioclavicular joint.  Needle placement was visualized and documented with ultrasound.  Needle placed in short axis to the probe.  Ultrasound visualization was necessary due to decreased joint space in the setting of osteoarthritis.  Images were permanently stored for the patient's record.  There were no complications. The patient tolerated the procedure well. There was negligible bleeding.   The patient was instructed to ice the shoulder upon leaving clinic and refrain from overuse over the next 3 days.   The patient was instructed to call or go to the emergency room with any unusual pain, swelling, redness, or if otherwise concerned.  Ronald Hernandez DO [facility]  Primary Care Sports Medicine  [facility]        Medium Joint Injection/Arthrocentesis: R acromioclavicular    Date/Time: 2/17/2021 12:16 PM  Performed by: Ronald Hernandez DO  Authorized by: Ronald Hernandez DO     Indications:  Pain  Needle Size:  22 G  Guidance: ultrasound    Approach:  " Superior  Location:  Shoulder  Site:  R acromioclavicular  Medications:  20 mg triamcinolone 40 MG/ML; 2 mL lidocaine (PF) 1 %  Outcome:  Tolerated well, no immediate complications  Procedure discussed: discussed risks, benefits, and alternatives    Consent Given by:  Patient  Timeout: timeout called immediately prior to procedure    Prep: patient was prepped and draped in usual sterile fashion

## 2021-02-17 NOTE — PROGRESS NOTES
CHIEF CONCERN:  R shoulder pain    HISTORY OF PRESENT ILLNESS:  Sonya Foote is a 70 yo RHD Female w PMHx of chronic pain syndrome s/p IT pump ([facility]), COPD, DM2, HTN, CAD, hx strokes, PAD s/p bilat LE amputations AKAs, cervical spondylosis, tobacco use disorder, visual impairment who presents to clinic with acute on chronic exacerbation of R shoulder pain after falling out of her chair at her nursing facility at the end of December. She did go to the ED at that time and there was not found to be any evidence of fracture or dislocation. Since that time, R shoulder has been severely painful all the time. She localizes the pain to the biceps groove, AC joint, and deep in the joint. All movements of her shoulder are painful particularly when she is sewing. Resting her BUE on pillows seems to relieve some of the pain. She takes fentanyl and morphine regularly for pain and has in intrathecal pain pump. She has never had any formal shoulder injections or completed physical therapy. She is a current smoker (10 cigarettes per day) and takes a daily baby aspirin.     Past Medical History:   Diagnosis Date     Acid reflux disease      Amputation above knee (H)     bilateral     Benign essential hypertension      Blind left eye     due to central retinal artery occlusion     CAD (coronary artery disease)     UA and inferior MI in 2016 s/p PCI     Chronic back pain      Chronic neck pain      Chronic pain syndrome     with fentanyl intrathecal pain pump     Complex sleep apnea syndrome      COPD (chronic obstructive pulmonary disease) (H)      Dysphagia     chronic due to esophageal strictures and multiple previous dilitations      ROGER (generalized anxiety disorder)      History of blood transfusion     x5; no adverse reactions     History of central retinal artery occlusion 2006    left-sided     History of stroke     residual left-sided weakness     Hx of carotid artery stenosis     s/p left carotid stent in 2006 and  balloon angioplasty in 2016     MDD (major depressive disorder)      Neurogenic bladder     SPT in place     JOSE (obstructive sleep apnea)      Osteoporosis      PAD (peripheral artery disease) (H)      Peripheral neuropathy      Person who has had sex change operation     male to female     Seizure disorder (H)     many years since last grand mal; daily, brief petit mals     Sickle cell trait (H)      Type 2 diabetes mellitus (H)        Past Surgical History:   Procedure Laterality Date     AMPUTATE LEG ABOVE KNEE Left 6/11/2016    Procedure: AMPUTATE LEG ABOVE KNEE;  Surgeon: Mello Rodriguez MD;  [facility]     AMPUTATE LEG BELOW KNEE Right 11/7/2016    Procedure: AMPUTATE LEG BELOW KNEE;  Surgeon: Savannah Durant MD;  [facility]     AMPUTATE REVISION STUMP LOWER EXTREMITY Right 11/11/2016    Procedure: AMPUTATE REVISION STUMP LOWER EXTREMITY;  Surgeon: Savannah Durant MD;  [facility]     AMPUTATE REVISION STUMP LOWER EXTREMITY Right 11/16/2016    Procedure: AMPUTATE REVISION STUMP LOWER EXTREMITY;  Surgeon: Savannah Durant MD;  [facility]     AMPUTATE TOE(S) Right 1/5/2016    Procedure: AMPUTATE TOE(S);  Surgeon: Mello Gaines DPM;  Location: [facility]     ANGIOGRAM Bilateral 11/21/2014    Procedure: ANGIOGRAM;  Surgeon: Savannah Durant MD;  [facility]     ANGIOGRAM Left 1/16/2015    Procedure: ANGIOGRAM;  Surgeon: Savannah Durant MD;  [facility]     ANGIOGRAM Bilateral 9/14/2015    Procedure: ANGIOGRAM;  Surgeon: Savannah Durant MD;  [facility]     ANGIOGRAM Left 10/12/2015    Procedure: ANGIOGRAM;  Surgeon: Savannah Durant MD;  [facility]     ANGIOGRAM Right 6/6/2016    Procedure: ANGIOGRAM;  Surgeon: Savannah Durant MD;  [facility]     ANGIOPLASTY Right 6/6/2016    Procedure: ANGIOPLASTY;  Surgeon: Savannah Durant MD;  [facility]     APPENDECTOMY       BREAST BIOPSY, RT/LT Right     benign     CATARACT IOL, RT/LT Right      CHOLECYSTECTOMY       COLONOSCOPY N/A 8/25/2014     Procedure: COLONOSCOPY;  Surgeon: Mello Ferrer MD;  [facility]     COLONOSCOPY WITH CO2 INSUFFLATION N/A 8/20/2014    Procedure: COLONOSCOPY WITH CO2 INSUFFLATION;  Surgeon: Duane, William Charles, MD;  Location: [facility]     CYSTOSCOPY, INJECT BOTOX N/A 5/21/2020    Procedure: CYSTOSCOPY, WITH BOTULINUM TOXIN INJECTION;  Surgeon: Loki Gordon MD;  [facility]     CYSTOSCOPY, INJECT BOTOX N/A 10/8/2020    Procedure: CYSTOSCOPY, INJECT BOTOX INTO BLADDER, SUPRAPUBIC TUBE EXCHNAGE;  Surgeon: Loki Gordon MD;  [facility]     CYSTOSCOPY, INJECT BOTOX N/A 1/7/2021    Procedure: CYSTOSCOPY, WITH BOTULINUM TOXIN INJECTION;  Surgeon: Loki Gordon MD;  [facility]     CYSTOSCOPY, INTRAVESICAL INJECTION N/A 10/31/2019    Procedure: CYSTOSCOPY, BOTOX INJECTION, Suprapubic Catheter Exchange;  Surgeon: Loki Gordon MD;  [facility]     CYSTOSTOMY, INSERT TUBE SUPRAPUBIC, COMBINED N/A 1/16/2018    Procedure: COMBINED CYSTOSTOMY, INSERT TUBE SUPRAPUBIC;  Cystoscopy, Intraoperative Ultrasound, Suprapubic Tube Placement;  Surgeon: Keanu Dawson MD;  [facility]     ENDARTERECTOMY FEMORAL  5/23/2014    Procedure: ENDARTERECTOMY FEMORAL;  Surgeon: Jason Joshi MD;  [facility]     ESOPHAGOSCOPY, GASTROSCOPY, DUODENOSCOPY (EGD), COMBINED  12/14/2012    Procedure: COMBINED ESOPHAGOSCOPY, GASTROSCOPY, DUODENOSCOPY (EGD), BIOPSY SINGLE OR MULTIPLE;  ESOPHAGOSCOPY, GASTROSCOPY, DUODENOSCOPY (EGD), DILATATION ;  Surgeon: Elizabeth Stevenson MD;  Location:  GI     ESOPHAGOSCOPY, GASTROSCOPY, DUODENOSCOPY (EGD), COMBINED  12/31/2013    Procedure: COMBINED ESOPHAGOSCOPY, GASTROSCOPY, DUODENOSCOPY (EGD), BIOPSY SINGLE OR MULTIPLE;;  Surgeon: Clemente Lopez MD;  [facility]     ESOPHAGOSCOPY, GASTROSCOPY, DUODENOSCOPY (EGD), COMBINED  4/1/2014    Procedure: COMBINED ESOPHAGOSCOPY, GASTROSCOPY, DUODENOSCOPY (EGD);;  Surgeon: Clemente Lopez MD;   [facility]     ESOPHAGOSCOPY, GASTROSCOPY, DUODENOSCOPY (EGD), COMBINED  6/28/2014    Procedure: COMBINED ESOPHAGOSCOPY, GASTROSCOPY, DUODENOSCOPY (EGD);  Surgeon: Clemente Lopez MD;  [facility]     ESOPHAGOSCOPY, GASTROSCOPY, DUODENOSCOPY (EGD), COMBINED N/A 8/20/2014    Procedure: COMBINED ESOPHAGOSCOPY, GASTROSCOPY, DUODENOSCOPY (EGD), BIOPSY SINGLE OR MULTIPLE;  Surgeon: Duane, William Charles, MD;  [facility]     ESOPHAGOSCOPY, GASTROSCOPY, DUODENOSCOPY (EGD), COMBINED N/A 8/22/2014    Procedure: COMBINED ESOPHAGOSCOPY, GASTROSCOPY, DUODENOSCOPY (EGD), BIOPSY SINGLE OR MULTIPLE;  Surgeon: Mello Ferrer MD;  [facility]     ESOPHAGOSCOPY, GASTROSCOPY, DUODENOSCOPY (EGD), COMBINED N/A 10/2/2014    Procedure: COMBINED ESOPHAGOSCOPY, GASTROSCOPY, DUODENOSCOPY (EGD), BIOPSY SINGLE OR MULTIPLE;  Surgeon: Remy Haskins MD;  [facility]     ESOPHAGOSCOPY, GASTROSCOPY, DUODENOSCOPY (EGD), COMBINED Left 12/15/2014    Procedure: COMBINED ESOPHAGOSCOPY, GASTROSCOPY, DUODENOSCOPY (EGD), BIOPSY SINGLE OR MULTIPLE;  Surgeon: Remy Haskins MD;  [facility]     ESOPHAGOSCOPY, GASTROSCOPY, DUODENOSCOPY (EGD), COMBINED N/A 2/25/2015    Procedure: COMBINED ENDOSCOPIC ULTRASOUND, ESOPHAGOSCOPY, GASTROSCOPY, DUODENOSCOPY (EGD), FINE NEEDLE ASPIRATE/BIOPSY;  Surgeon: Clemente Lugo MD;  [facility]     ESOPHAGOSCOPY, GASTROSCOPY, DUODENOSCOPY (EGD), COMBINED Left 2/25/2015    Procedure: COMBINED ESOPHAGOSCOPY, GASTROSCOPY, DUODENOSCOPY (EGD), BIOPSY SINGLE OR MULTIPLE;  Surgeon: Clemente Lugo MD;  [facility]     ESOPHAGOSCOPY, GASTROSCOPY, DUODENOSCOPY (EGD), COMBINED N/A 9/25/2016    Procedure: COMBINED ESOPHAGOSCOPY, GASTROSCOPY, DUODENOSCOPY (EGD);  Surgeon: Aziza Patiño MD;  Location:  GI     ESOPHAGOSCOPY, GASTROSCOPY, DUODENOSCOPY (EGD), COMBINED N/A 1/18/2017    Procedure: COMBINED ESOPHAGOSCOPY, GASTROSCOPY, DUODENOSCOPY (EGD), BIOPSY SINGLE OR  MULTIPLE;  Surgeon: Clemente Lopez MD;  [facility]     ESOPHAGOSCOPY, GASTROSCOPY, DUODENOSCOPY (EGD), COMBINED N/A 11/26/2017    Procedure: COMBINED ESOPHAGOSCOPY, GASTROSCOPY, DUODENOSCOPY (EGD), REMOVE FOREIGN BODY;  Esophagogastroduodenoscopy with foreign body extraction  ;  Surgeon: Herberth Castrejon MD;  [facility]     ESOPHAGOSCOPY, GASTROSCOPY, DUODENOSCOPY (EGD), COMBINED N/A 11/26/2017    Procedure: COMBINED ESOPHAGOSCOPY, GASTROSCOPY, DUODENOSCOPY (EGD), REMOVE FOREIGN BODY;;  Surgeon: Herberth Castrejon MD;  [facility]     ESOPHAGOSCOPY, GASTROSCOPY, DUODENOSCOPY (EGD), COMBINED N/A 4/10/2020    Procedure: ESOPHAGOGASTRODUODENOSCOPY (EGD);  Surgeon: Yael Cabral MD;  [facility]     FASCIOTOMY LOWER EXTREMITY Left 6/10/2016    Procedure: FASCIOTOMY LOWER EXTREMITY;  Surgeon: Mello Rodriguez MD;  [facility]     HC CAPSULE ENDOSCOPY N/A 8/25/2014    Procedure: CAPSULE/PILL CAM ENDOSCOPY;  Surgeon: Remy Haskins MD;  [facility]     HC CAPSULE ENDOSCOPY N/A 10/2/2014    Procedure: CAPSULE/PILL CAM ENDOSCOPY;  Surgeon: Remy Haskins MD;  [facility]     INJECT BLOCK MEDIAL BRANCH CERVICAL/THORACIC/LUMBAR Left 10/30/2020    Procedure: Cervical 3, 4, and 5 Medial Branch Block;  Surgeon: Evelyn Lima MD;  Location: [facility] OR     ORTHOPEDIC SURGERY      broken wrist repair     REVISE CATHETER INTRATHECAL  08/24/2020     SEX TRANSFORMATION SURGERY, MALE TO FEMALE  1974 1974     SINUS SURGERY      cyst removed     TONSILLECTOMY       VASCULAR SURGERY  2006    Left carotid stent       Current Outpatient Medications   Medication Sig Dispense Refill     ACETAMINOPHEN EXTRA STRENGTH 500 MG tablet TAKE 1 TABLET BY MOUTH EVERY 6 HOURS AS NEEDED FOR PAIN / FEVER 90 tablet 5     ADVAIR DISKUS 250-50 MCG/DOSE inhaler INHALE 1 PUFF BY MOUTH TWICE DAILY 1 Inhaler 5     albuterol (PROAIR HFA/PROVENTIL HFA/VENTOLIN HFA) 108 (90 Base) MCG/ACT inhaler Inhale 2  puffs into the lungs every 6 hours 1 Inhaler 5     albuterol (PROVENTIL) (2.5 MG/3ML) 0.083% neb solution Take 1 vial (2.5 mg) by nebulization every 6 hours as needed for shortness of breath / dyspnea or wheezing 180 mL 3     alendronate (FOSAMAX) 70 MG tablet TAKE ONE TABLET BY MOUTH EVERY 7 DAYS (TAKE WITH 8OZ OF WATER 30 MINUTES BEFORE BREAKFAST AND REMAIN UPRIGHT DURING THIS TIME) 4 tablet 97     ASPERCREME LIDOCAINE 4 % Patch APPLY 1 PATCH TOPICALLY TO LOWER BACK ONCE DAILY (ON FOR 12 HOURS, OFF FOR 12 HOURS) 30 patch 10     ASPIRIN LOW DOSE 81 MG chewable tablet CHEW AND SWALLOW ONE TABLET BY MOUTH IN THE MORNING 28 tablet 10     atorvastatin (LIPITOR) 40 MG tablet TAKE 1 TABLET BY MOUTH AT BEDTIME 28 tablet 10     blood glucose monitoring (ONE TOUCH ULTRA 2) meter device kit Use to test blood sugars 3 times daily or as directed. 1 kit 0     blood glucose monitoring (ONE TOUCH ULTRA) test strip Use to test blood sugars 3 times daily or as directed. 3 Box 3     blood glucose monitoring (ONE TOUCH ULTRASOFT) lancets Use to test blood sugar 3 times daily or as directed. 100 each PRN     brimonidine (ALPHAGAN) 0.2 % ophthalmic solution Place 1 drop into the right eye 2 times daily ( 12 hours apart).    Please keep 11-16-20 appt for refills. 5 mL 4     capsaicin-menthol-methyl sal 0.025-1-12 % external cream Apply 1 Application topically daily as needed (topical pain) 56.6 g 1     diclofenac (VOLTAREN) 1 % topical gel APPLY 2 GRAMS TOPICALLY TO AFFECTED AREA(S) THREE TIMES A  g 11     dorzolamide (TRUSOPT) 2 % ophthalmic solution Place 1 drop into the right eye 2 times daily 1 Bottle 1     EPINEPHrine (ANY BX GENERIC EQUIV) 0.3 MG/0.3ML injection 2-pack INJECT 0.3 ML INTO THE MUSCLE AS NEEDED FOR ANAPHYLAXIS 2 each 1     escitalopram (LEXAPRO) 10 MG tablet TAKE 1 TABLET BY MOUTH ONCE DAILY 28 tablet 10     FEROSUL 325 (65 Fe) MG tablet TAKE 1 TABLET BY MOUTH TWICE DAILY WITH MEALS 56 tablet 10      fluticasone (FLONASE) 50 MCG/ACT nasal spray SPRAY 2 SPRAYS IN EACH NOSTRIL DAILY 16 g 11     gabapentin (NEURONTIN) 100 MG capsule Take 1 capsule (100 mg) by mouth 3 times daily 60 capsule 0     INCRUSE ELLIPTA 62.5 MCG/INH Inhaler INHALE 1 PUFF BY MOUTH ONCE DAILY 30 each 10     lactulose (CHRONULAC) 10 GM/15ML solution TAKE 30ML (20MG) BY MOUTH AS NEEDED FOR CONSTIPATION 473 mL 1     latanoprost (XALATAN) 0.005 % ophthalmic solution INSTILL ONE DROP IN EACH EYE AT BEDTIME 2.5 mL 10     levETIRAcetam (KEPPRA) 500 MG tablet TAKE 1 TABLET BY MOUTH TWICE DAILY 56 tablet 11     lisinopril (ZESTRIL) 20 MG tablet TAKE 1 TABLET BY MOUTH EVERY MORNING 28 tablet 11     loratadine (CLARITIN) 10 MG tablet TAKE 1 TABLET BY MOUTH ONCE DAILY 28 tablet 11     Medication given by intrathecal pump continuous prn Drug # 1: Fentanyl (Sublimaze).    Conc:2000  mcg/mL  Total Dose / 24 hours: 600  mcg  Drug # 2: Bupivacaine (Marcaine) .    Conc:20  mg/mL  Total Dose / 24 hours: 6  mg  Drug # 3: Morphine (Duramorph or Infumorph) .    Conc:3.7  mg/mL  Total Dose / 24 hours: 1.11  mg   Diluent: NS       Menthol, Topical Analgesic, 5 % GEL Externally apply topically daily as needed 113 g 0     metFORMIN (GLUCOPHAGE) 500 MG tablet TAKE 1 TABLET BY MOUTH EVERY EVENING WITH DINNER 28 tablet 11     metoprolol succinate ER (TOPROL-XL) 50 MG 24 hr tablet TAKE 1 TABLET BY MOUTH EVERY MORNING 30 tablet 10     Multiple Vitamins-Minerals (TAB-A-MACY) TABS TAKE 1 TABLET BY MOUTH ONCE DAILY 28 tablet 11     multivitamin, therapeutic (THERA-VIT) TABS tablet Take 1 tablet by mouth daily       mupirocin (BACTROBAN) 2 % external ointment Apply 1 each topically daily nares       nicotine (NICODERM CQ) 14 MG/24HR 24 hr patch APPLY 1 PATCH TOPICALLY DAILY ( EVERY 24 HOURS ) 28 patch 3     nitroGLYcerin (NITROSTAT) 0.4 MG sublingual tablet DISSOLVE ONE TABLET UNDER TONGUE AS NEEDED FOR CHEST PAIN MAY REPEAT EVERY 5 MINUTES FOR 3 DOSES, IF SYMPTOMS PERSIST  CALL 911 25 tablet 3     Nutritional Supplements (ENSURE) LIQD DRINK ONE CAN / BOTTLE BY MOUTH TWICE DAILY WITH MEALS 92417 mL 11     oxyCODONE (OXY-IR) 5 MG capsule Take 1-2 capsules by mouth every 4 hours as needed for severe pain       pantoprazole (PROTONIX) 40 MG EC tablet TAKE 1 TABLET BY MOUTH EVERY MORNING 28 tablet 11     phenytoin (DILANTIN) 100 MG capsule TAKE 2 CAPS (200MG) BY MOUTH TWICE A  capsule 11     polyethylene glycol (MIRALAX) 17 GM/SCOOP powder MIX 17GM OF POWDER IN 8OZ OF WATER UNTIL COMPLETELY DISSOLVED. DRINK SOLUTION BY MOUTH IN THE MORNING 510 g 8     pregabalin (LYRICA) 150 MG capsule TAKE 1 CAPSULE BY MOUTH THREE TIMES DAILY 90 capsule 4     risperiDONE (RISPERDAL) 0.5 MG tablet TAKE 1 TABLET BY MOUTH AT BEDTIME 28 tablet 11     SENEXON-S 8.6-50 MG tablet TAKE 1 TABLET BY MOUTH TWICE DAILY 56 tablet 10     solifenacin (VESICARE) 10 MG tablet TAKE 1 TABLET BY MOUTH EVERY MORNING 28 tablet 10     traZODone (DESYREL) 150 MG tablet TAKE 1 TABLET BY MOUTH AT BEDTIME 28 tablet 10     vitamin D3 (CHOLECALCIFEROL) 10 MCG (400 UNIT) capsule Take 2 capsules by mouth daily       Vitamin D3 (VITAMIN D) 10 MCG (400 UNIT) tablet TAKE TWO TABLETS (800 UNITS) BY MOUTH ONCE DAILY 56 tablet 11          Allergies   Allergen Reactions     Bee Venom Anaphylaxis     Penicillins Anaphylaxis     Dilantin [Phenytoin] Other (See Comments)     Generic dilantin only per pt     Iodine Hives     Novocaine [Procaine] Hives     Tositumomab Unknown       SOCIAL HISTORY:    Social History     Socioeconomic History     Marital status:      Spouse name: Not on file     Number of children: 2     Years of education: Not on file     Highest education level: Not on file   Occupational History     Occupation: Retired   Social Needs     Financial resource strain: Not on file     Food insecurity     Worry: Not on file     Inability: Not on file     Transportation needs     Medical: Not on file     Non-medical: Not  on file   Tobacco Use     Smoking status: Current Every Day Smoker     Packs/day: 1.00     Years: 30.00     Pack years: 30.00     Types: Cigarettes, Cigars     Smokeless tobacco: Never Used     Tobacco comment: 1.5 packs per day    Substance and Sexual Activity     Alcohol use: No     Alcohol/week: 0.0 standard drinks     Drug use: No     Sexual activity: Not Currently   Lifestyle     Physical activity     Days per week: Not on file     Minutes per session: Not on file     Stress: Not on file   Relationships     Social connections     Talks on phone: Not on file     Gets together: Not on file     Attends Yarsanism service: Not on file     Active member of club or organization: Not on file     Attends meetings of clubs or organizations: Not on file     Relationship status: Not on file     Intimate partner violence     Fear of current or ex partner: Not on file     Emotionally abused: Not on file     Physically abused: Not on file     Forced sexual activity: Not on file   Other Topics Concern     Parent/sibling w/ CABG, MI or angioplasty before 65F 55M? Not Asked      Service Not Asked     Blood Transfusions Not Asked     Caffeine Concern No     Comment: 1 in the morning     Occupational Exposure Not Asked     Hobby Hazards Not Asked     Sleep Concern Not Asked     Stress Concern Not Asked     Weight Concern Not Asked     Special Diet Not Asked     Back Care Not Asked     Exercise Not Asked     Bike Helmet Not Asked     Seat Belt Not Asked     Self-Exams Not Asked   Social History Narrative    Living in a nursing home (as of 2020) for prosthetic fitting and therapy; usually resides in an [facility].    Two adopted children (Kam and Prashanth) and 3 grandchildren (as of 2020).       FAMILY HISTORY: Reviewed in EMR      REVIEW OF SYSTEMS: Positive for that noted in past medical history and history of present illness and otherwise reviewed in EMR     PHYSICAL EXAM:    Skin intact. No erythema. Sensation intact all  dermatomes into the hand to light touch. EPL, FPL, and Intrinsics intact. Right shoulder active motion is FE to 90 (PROM to 150), AROM abduction to 70 (PROM 90), ER at side to 80, and IR to behind her head (exam limited by sitting in her chair). Left shoulder active motion is FE to 90 (PROM to 150), AROM abduction to 70 (PROM 90), ER to 80, and IR to behind her head.  Negative Neer and Underwood, but exam is severely limited by pain and lack of ROM. Mild TTP pain on palpation over the AC joint. Mild pain on palpation over the long head of the biceps.     Special tests:    Biceps:    - Mild TTP at bicipital groove   - Speeds: Negative   - Yergasons: Negative      AC joint:   - Positive TTP at AC joint   - Cross-body adduction: Positive      Rotator Cuff   - Domonique's test (Supra): Negative      Labrum/SLAP   - McMullen: Positive      Impingement:   - Neer: Negative   - Underwood: Negative     IMAGING:  XR R shoulder reviewed today 2/17/21:  -Demonstrates degenerative changes of the AC joint with mild degenerative changes of the glenohumeral joint indicating mild glenohumeral arthritis. The humeral head is appropriately located in the glenoid without any evidence of fracture/dislocation. There is some evidence of calcific tendinosis of the rotator cuff best visualized on the R axillary view    ASSESSMENT:    1. R AC joint arthritis  2. R calcific tendinosis of rotator cuff  3. Status post left AKA    PLAN:  We discussed the imaging and exam findings today with the patient. We reviewed the risks and benefits of both nonop and possible operative options. We advised formal non-surgical treatment including the possibility of injections. She would like to proceed with those today.  1. AC joint and subacromial steroid injection in clinic  2. Will follow-up with nonoperative musculoskeletal team as needed.    Ariadna Mcneill MD

## 2021-02-17 NOTE — TELEPHONE ENCOUNTER
Prior Authorization Retail Medication Request    Medication/Dose: solifenacin (VESICARE) 10 MG tablet  ICD code (if different than what is on RX):  R35.0  Previously Tried and Failed:    Rationale:      Insurance Name:  [employer]  Insurance ID:  [id number]      Pharmacy Information (if different than what is on RX)  Name:  [facility]  Phone:  [phone]

## 2021-02-17 NOTE — PROGRESS NOTES
"PROCEDURE ENCOUNTER    [facility]  Orthopedics  Ronald Hernandez, DO  2021     Name: Sonya Foote  MRN: [id number]  Age: 71 year old  : 1950    Referring provider: Dr. Ariadna Mcneill MD  Diagnosis: AC joint pain of right shoulder.  Subacromial bursitis of right shoulder.    Subacromial Bursa - Ultrasound Guided  The patient was informed of the risks and the benefits of the procedure and a written consent was signed.  The patient s right shoulder was prepped with chlorhexidine in sterile fashion.   20 mg of triamcinolone suspension was drawn up into a 5 mL syringe with 4 mL of 1% lidocaine.  Injection was performed using sterile technique.  Under ultrasound guidance a 1.5-inch 22-gauge needle was used to enter the right subacromial bursa.  Anterolateral approach was used with arm held in Crass position.  Needle placement was visualized and documented with ultrasound.  Ultrasound visualization was necessary to ensure placement in to the bursa and not the rotator cuff tendon which could potentially cause further tendon damage.  Injection performed long axis to the probe.  Injection solution visualized within the joint space.  Images were permanently stored for the patient's record.  There were no complications. The patient tolerated the procedure well. There was negligible bleeding.   PROCEDURE #2    Right Acromioclavicular Injection - Ultrasound Guided  The patient was informed of the risks and the benefits of the procedure and a written consent was signed.  The patient s right acromioclavicular joint was prepped with chlorhexidine in sterile fashion.   20 mg of triamcinolone suspension was drawn up into a 3 mL syringe with 1 mL of 1% lidocaine.  Injection was performed using sterile technique.  Under ultrasound guidance a 1.5\" 22-gauge needle was used to enter the right acromioclavicular joint.  Needle placement was visualized and documented with ultrasound.  Needle placed in short axis to the " probe.  Ultrasound visualization was necessary due to decreased joint space in the setting of osteoarthritis.  Images were permanently stored for the patient's record.  There were no complications. The patient tolerated the procedure well. There was negligible bleeding.   The patient was instructed to ice the shoulder upon leaving clinic and refrain from overuse over the next 3 days.   The patient was instructed to call or go to the emergency room with any unusual pain, swelling, redness, or if otherwise concerned.  Ronald Hernandez DO [facility]  Primary Care Sports Medicine  [facility]

## 2021-02-17 NOTE — NURSING NOTE
"Reason For Visit:   Chief Complaint   Patient presents with     Consult     Right shoulder pain. Ref. Dr. Allan Casey       PCP: Allan Casey  Ref: Allan Casey    ?  No  Occupation Retired - .  Currently working? No.    Date of injury: 12/20  Type of injury: Fall.  Date of surgery: Na  Type of surgery: NA.  Smoker: No  Request smoking cessation information: No    Right hand dominant    SANE score  Affected shoulder: Right  Right shoulder SANE: 5  Left shoulder SANE: 100    Ht 1.6 m (5' 2.99\")   Wt 56.7 kg (125 lb)   BMI 22.15 kg/m        Pain Assessment  Patient Currently in Pain: Yes  0-10 Pain Scale: 8  Primary Pain Location: Shoulder(Right)  Pain Descriptors: Sharp  Alleviating Factors: Rest  Aggravating Factors: Movement    Desiree Santos ATC        "

## 2021-02-17 NOTE — PROGRESS NOTES
Large Joint Injection/Arthocentesis: L subacromial bursa    Date/Time: 2/17/2021 10:55 AM  Performed by: Ronald Hernandez DO  Authorized by: Ronald Hernandez DO     Indications:  Pain  Needle Size:  22 G  Guidance: ultrasound    Approach:  Posterolateral  Location:  Shoulder   Location comment:  AND L AC Joint      Site:  L subacromial bursa  Medications:  3 mL lidocaine (PF) 1 %; 20 mg triamcinolone 40 MG/ML  Outcome:  Tolerated well, no immediate complications  Procedure discussed: discussed risks, benefits, and alternatives    Consent Given by:  Patient  Timeout: timeout called immediately prior to procedure    Prep: patient was prepped and draped in usual sterile fashion     Scribed by Allan Goodman ATC, ATC for Dr. Hernandez on 2/17/21 at 10:30AM, based on the provider's statements to me.

## 2021-02-17 NOTE — LETTER
2/17/2021         RE: Sonya Foote  [address]  [place] [address]        Dear Colleague,    Thank you for referring your patient, Sonya Foote, to the [facility] [place]. Please see a copy of my visit note below.    CHIEF CONCERN:  R shoulder pain    HISTORY OF PRESENT ILLNESS:  Sonya Foote is a 72 yo RHD Female w PMHx of chronic pain syndrome s/p IT pump ([facility]), COPD, DM2, HTN, CAD, hx strokes, PAD s/p bilat LE amputations AKAs, cervical spondylosis, tobacco use disorder, visual impairment who presents to clinic with acute on chronic exacerbation of R shoulder pain after falling out of her chair at her nursing facility at the end of December. She did go to the ED at that time and there was not found to be any evidence of fracture or dislocation. Since that time, R shoulder has been severely painful all the time. She localizes the pain to the biceps groove, AC joint, and deep in the joint. All movements of her shoulder are painful particularly when she is sewing. Resting her BUE on pillows seems to relieve some of the pain. She takes fentanyl and morphine regularly for pain and has in intrathecal pain pump. She has never had any formal shoulder injections or completed physical therapy. She is a current smoker (10 cigarettes per day) and takes a daily baby aspirin.     Past Medical History:   Diagnosis Date     Acid reflux disease      Amputation above knee (H)     bilateral     Benign essential hypertension      Blind left eye     due to central retinal artery occlusion     CAD (coronary artery disease)     UA and inferior MI in 2016 s/p PCI     Chronic back pain      Chronic neck pain      Chronic pain syndrome     with fentanyl intrathecal pain pump     Complex sleep apnea syndrome      COPD (chronic obstructive pulmonary disease) (H)      Dysphagia     chronic due to esophageal strictures and multiple previous dilitations      ROGER (generalized anxiety  disorder)      History of blood transfusion     x5; no adverse reactions     History of central retinal artery occlusion 2006    left-sided     History of stroke     residual left-sided weakness     Hx of carotid artery stenosis     s/p left carotid stent in 2006 and balloon angioplasty in 2016     MDD (major depressive disorder)      Neurogenic bladder     SPT in place     JOSE (obstructive sleep apnea)      Osteoporosis      PAD (peripheral artery disease) (H)      Peripheral neuropathy      Person who has had sex change operation     male to female     Seizure disorder (H)     many years since last grand mal; daily, brief petit mals     Sickle cell trait (H)      Type 2 diabetes mellitus (H)        Past Surgical History:   Procedure Laterality Date     AMPUTATE LEG ABOVE KNEE Left 6/11/2016    Procedure: AMPUTATE LEG ABOVE KNEE;  Surgeon: Mello Rodriguez MD;  [facility]     AMPUTATE LEG BELOW KNEE Right 11/7/2016    Procedure: AMPUTATE LEG BELOW KNEE;  Surgeon: Savannah Durant MD;  [facility]     AMPUTATE REVISION STUMP LOWER EXTREMITY Right 11/11/2016    Procedure: AMPUTATE REVISION STUMP LOWER EXTREMITY;  Surgeon: Savannah Durant MD;  [facility]     AMPUTATE REVISION STUMP LOWER EXTREMITY Right 11/16/2016    Procedure: AMPUTATE REVISION STUMP LOWER EXTREMITY;  Surgeon: Savannah Durant MD;  [facility]     AMPUTATE TOE(S) Right 1/5/2016    Procedure: AMPUTATE TOE(S);  Surgeon: Mello Gaines DPM;  Location:  [facility]     ANGIOGRAM Bilateral 11/21/2014    Procedure: ANGIOGRAM;  Surgeon: Savannah Durant MD;  [facility]     ANGIOGRAM Left 1/16/2015    Procedure: ANGIOGRAM;  Surgeon: Savannah Durant MD;  [facility]     ANGIOGRAM Bilateral 9/14/2015    Procedure: ANGIOGRAM;  Surgeon: Savannah Durant MD;  [facility]     ANGIOGRAM Left 10/12/2015    Procedure: ANGIOGRAM;  Surgeon: Savannah Durant MD;  [facility]     ANGIOGRAM Right 6/6/2016    Procedure: ANGIOGRAM;  Surgeon: Savannah Durant  MD;  [facility]     ANGIOPLASTY Right 6/6/2016    Procedure: ANGIOPLASTY;  Surgeon: Savannah Durant MD;  [facility]     APPENDECTOMY       BREAST BIOPSY, RT/LT Right     benign     CATARACT IOL, RT/LT Right      CHOLECYSTECTOMY       COLONOSCOPY N/A 8/25/2014    Procedure: COLONOSCOPY;  Surgeon: Mello Ferrer MD;  [facility]     COLONOSCOPY WITH CO2 INSUFFLATION N/A 8/20/2014    Procedure: COLONOSCOPY WITH CO2 INSUFFLATION;  Surgeon: Duane, William Charles, MD;  Location: [facility]     CYSTOSCOPY, INJECT BOTOX N/A 5/21/2020    Procedure: CYSTOSCOPY, WITH BOTULINUM TOXIN INJECTION;  Surgeon: Loki Gordon MD;  [facility]     CYSTOSCOPY, INJECT BOTOX N/A 10/8/2020    Procedure: CYSTOSCOPY, INJECT BOTOX INTO BLADDER, SUPRAPUBIC TUBE EXCHNAGE;  Surgeon: Loki Gordon MD;  [facility]     CYSTOSCOPY, INJECT BOTOX N/A 1/7/2021    Procedure: CYSTOSCOPY, WITH BOTULINUM TOXIN INJECTION;  Surgeon: Loki Gordon MD;  [facility]     CYSTOSCOPY, INTRAVESICAL INJECTION N/A 10/31/2019    Procedure: CYSTOSCOPY, BOTOX INJECTION, Suprapubic Catheter Exchange;  Surgeon: Loki Gordon MD;  [facility]     CYSTOSTOMY, INSERT TUBE SUPRAPUBIC, COMBINED N/A 1/16/2018    Procedure: COMBINED CYSTOSTOMY, INSERT TUBE SUPRAPUBIC;  Cystoscopy, Intraoperative Ultrasound, Suprapubic Tube Placement;  Surgeon: Keanu Dawson MD;  [facility]     ENDARTERECTOMY FEMORAL  5/23/2014    Procedure: ENDARTERECTOMY FEMORAL;  Surgeon: Jason Joshi MD;  [facility]     ESOPHAGOSCOPY, GASTROSCOPY, DUODENOSCOPY (EGD), COMBINED  12/14/2012    Procedure: COMBINED ESOPHAGOSCOPY, GASTROSCOPY, DUODENOSCOPY (EGD), BIOPSY SINGLE OR MULTIPLE;  ESOPHAGOSCOPY, GASTROSCOPY, DUODENOSCOPY (EGD), DILATATION ;  Surgeon: Elizabeth Stevenson MD;  Location:  GI     ESOPHAGOSCOPY, GASTROSCOPY, DUODENOSCOPY (EGD), COMBINED  12/31/2013    Procedure: COMBINED ESOPHAGOSCOPY, GASTROSCOPY,  DUODENOSCOPY (EGD), BIOPSY SINGLE OR MULTIPLE;;  Surgeon: Clemente Lopez MD;  [facility]     ESOPHAGOSCOPY, GASTROSCOPY, DUODENOSCOPY (EGD), COMBINED  4/1/2014    Procedure: COMBINED ESOPHAGOSCOPY, GASTROSCOPY, DUODENOSCOPY (EGD);;  Surgeon: Clemente Lopez MD;  [facility]     ESOPHAGOSCOPY, GASTROSCOPY, DUODENOSCOPY (EGD), COMBINED  6/28/2014    Procedure: COMBINED ESOPHAGOSCOPY, GASTROSCOPY, DUODENOSCOPY (EGD);  Surgeon: Clemente Lopez MD;  [facility]     ESOPHAGOSCOPY, GASTROSCOPY, DUODENOSCOPY (EGD), COMBINED N/A 8/20/2014    Procedure: COMBINED ESOPHAGOSCOPY, GASTROSCOPY, DUODENOSCOPY (EGD), BIOPSY SINGLE OR MULTIPLE;  Surgeon: Duane, William Charles, MD;  Location:  OR     ESOPHAGOSCOPY, GASTROSCOPY, DUODENOSCOPY (EGD), COMBINED N/A 8/22/2014    Procedure: COMBINED ESOPHAGOSCOPY, GASTROSCOPY, DUODENOSCOPY (EGD), BIOPSY SINGLE OR MULTIPLE;  Surgeon: Mello Ferrer MD;  [facility]     ESOPHAGOSCOPY, GASTROSCOPY, DUODENOSCOPY (EGD), COMBINED N/A 10/2/2014    Procedure: COMBINED ESOPHAGOSCOPY, GASTROSCOPY, DUODENOSCOPY (EGD), BIOPSY SINGLE OR MULTIPLE;  Surgeon: Remy Haskins MD;  [facility]     ESOPHAGOSCOPY, GASTROSCOPY, DUODENOSCOPY (EGD), COMBINED Left 12/15/2014    Procedure: COMBINED ESOPHAGOSCOPY, GASTROSCOPY, DUODENOSCOPY (EGD), BIOPSY SINGLE OR MULTIPLE;  Surgeon: Remy Haskins MD;  [facility]     ESOPHAGOSCOPY, GASTROSCOPY, DUODENOSCOPY (EGD), COMBINED N/A 2/25/2015    Procedure: COMBINED ENDOSCOPIC ULTRASOUND, ESOPHAGOSCOPY, GASTROSCOPY, DUODENOSCOPY (EGD), FINE NEEDLE ASPIRATE/BIOPSY;  Surgeon: Clemente Lugo MD;  [facility]     ESOPHAGOSCOPY, GASTROSCOPY, DUODENOSCOPY (EGD), COMBINED Left 2/25/2015    Procedure: COMBINED ESOPHAGOSCOPY, GASTROSCOPY, DUODENOSCOPY (EGD), BIOPSY SINGLE OR MULTIPLE;  Surgeon: Clemente Lugo MD;  Location:  GI     ESOPHAGOSCOPY, GASTROSCOPY, DUODENOSCOPY (EGD), COMBINED N/A 9/25/2016    Procedure:  COMBINED ESOPHAGOSCOPY, GASTROSCOPY, DUODENOSCOPY (EGD);  Surgeon: Aziza Patiño MD;  [facility]     ESOPHAGOSCOPY, GASTROSCOPY, DUODENOSCOPY (EGD), COMBINED N/A 1/18/2017    Procedure: COMBINED ESOPHAGOSCOPY, GASTROSCOPY, DUODENOSCOPY (EGD), BIOPSY SINGLE OR MULTIPLE;  Surgeon: Clemente Lopez MD;  [facility]     ESOPHAGOSCOPY, GASTROSCOPY, DUODENOSCOPY (EGD), COMBINED N/A 11/26/2017    Procedure: COMBINED ESOPHAGOSCOPY, GASTROSCOPY, DUODENOSCOPY (EGD), REMOVE FOREIGN BODY;  Esophagogastroduodenoscopy with foreign body extraction  ;  Surgeon: Herberth Castrejon MD;  [facility]     ESOPHAGOSCOPY, GASTROSCOPY, DUODENOSCOPY (EGD), COMBINED N/A 11/26/2017    Procedure: COMBINED ESOPHAGOSCOPY, GASTROSCOPY, DUODENOSCOPY (EGD), REMOVE FOREIGN BODY;;  Surgeon: Herberth Castrejon MD;  [facility]     ESOPHAGOSCOPY, GASTROSCOPY, DUODENOSCOPY (EGD), COMBINED N/A 4/10/2020    Procedure: ESOPHAGOGASTRODUODENOSCOPY (EGD);  Surgeon: Yael Cabral MD;  [facility]     FASCIOTOMY LOWER EXTREMITY Left 6/10/2016    Procedure: FASCIOTOMY LOWER EXTREMITY;  Surgeon: Mello Rodriguez MD;  [facility]     HC CAPSULE ENDOSCOPY N/A 8/25/2014    Procedure: CAPSULE/PILL CAM ENDOSCOPY;  Surgeon: Remy Haskins MD;  [facility]     HC CAPSULE ENDOSCOPY N/A 10/2/2014    Procedure: CAPSULE/PILL CAM ENDOSCOPY;  Surgeon: Remy Haskins MD;  [facility]     INJECT BLOCK MEDIAL BRANCH CERVICAL/THORACIC/LUMBAR Left 10/30/2020    Procedure: Cervical 3, 4, and 5 Medial Branch Block;  Surgeon: Evelyn Lima MD;  Location: [facility] OR     ORTHOPEDIC SURGERY      broken wrist repair     REVISE CATHETER INTRATHECAL  08/24/2020     SEX TRANSFORMATION SURGERY, MALE TO FEMALE  1974    1974     SINUS SURGERY      cyst removed     TONSILLECTOMY       VASCULAR SURGERY  2006    Left carotid stent       Current Outpatient Medications   Medication Sig Dispense Refill     ACETAMINOPHEN EXTRA STRENGTH 500 MG  tablet TAKE 1 TABLET BY MOUTH EVERY 6 HOURS AS NEEDED FOR PAIN / FEVER 90 tablet 5     ADVAIR DISKUS 250-50 MCG/DOSE inhaler INHALE 1 PUFF BY MOUTH TWICE DAILY 1 Inhaler 5     albuterol (PROAIR HFA/PROVENTIL HFA/VENTOLIN HFA) 108 (90 Base) MCG/ACT inhaler Inhale 2 puffs into the lungs every 6 hours 1 Inhaler 5     albuterol (PROVENTIL) (2.5 MG/3ML) 0.083% neb solution Take 1 vial (2.5 mg) by nebulization every 6 hours as needed for shortness of breath / dyspnea or wheezing 180 mL 3     alendronate (FOSAMAX) 70 MG tablet TAKE ONE TABLET BY MOUTH EVERY 7 DAYS (TAKE WITH 8OZ OF WATER 30 MINUTES BEFORE BREAKFAST AND REMAIN UPRIGHT DURING THIS TIME) 4 tablet 97     ASPERCREME LIDOCAINE 4 % Patch APPLY 1 PATCH TOPICALLY TO LOWER BACK ONCE DAILY (ON FOR 12 HOURS, OFF FOR 12 HOURS) 30 patch 10     ASPIRIN LOW DOSE 81 MG chewable tablet CHEW AND SWALLOW ONE TABLET BY MOUTH IN THE MORNING 28 tablet 10     atorvastatin (LIPITOR) 40 MG tablet TAKE 1 TABLET BY MOUTH AT BEDTIME 28 tablet 10     blood glucose monitoring (ONE TOUCH ULTRA 2) meter device kit Use to test blood sugars 3 times daily or as directed. 1 kit 0     blood glucose monitoring (ONE TOUCH ULTRA) test strip Use to test blood sugars 3 times daily or as directed. 3 Box 3     blood glucose monitoring (ONE TOUCH ULTRASOFT) lancets Use to test blood sugar 3 times daily or as directed. 100 each PRN     brimonidine (ALPHAGAN) 0.2 % ophthalmic solution Place 1 drop into the right eye 2 times daily ( 12 hours apart).    Please keep 11-16-20 appt for refills. 5 mL 4     capsaicin-menthol-methyl sal 0.025-1-12 % external cream Apply 1 Application topically daily as needed (topical pain) 56.6 g 1     diclofenac (VOLTAREN) 1 % topical gel APPLY 2 GRAMS TOPICALLY TO AFFECTED AREA(S) THREE TIMES A  g 11     dorzolamide (TRUSOPT) 2 % ophthalmic solution Place 1 drop into the right eye 2 times daily 1 Bottle 1     EPINEPHrine (ANY BX GENERIC EQUIV) 0.3 MG/0.3ML injection  2-pack INJECT 0.3 ML INTO THE MUSCLE AS NEEDED FOR ANAPHYLAXIS 2 each 1     escitalopram (LEXAPRO) 10 MG tablet TAKE 1 TABLET BY MOUTH ONCE DAILY 28 tablet 10     FEROSUL 325 (65 Fe) MG tablet TAKE 1 TABLET BY MOUTH TWICE DAILY WITH MEALS 56 tablet 10     fluticasone (FLONASE) 50 MCG/ACT nasal spray SPRAY 2 SPRAYS IN EACH NOSTRIL DAILY 16 g 11     gabapentin (NEURONTIN) 100 MG capsule Take 1 capsule (100 mg) by mouth 3 times daily 60 capsule 0     INCRUSE ELLIPTA 62.5 MCG/INH Inhaler INHALE 1 PUFF BY MOUTH ONCE DAILY 30 each 10     lactulose (CHRONULAC) 10 GM/15ML solution TAKE 30ML (20MG) BY MOUTH AS NEEDED FOR CONSTIPATION 473 mL 1     latanoprost (XALATAN) 0.005 % ophthalmic solution INSTILL ONE DROP IN EACH EYE AT BEDTIME 2.5 mL 10     levETIRAcetam (KEPPRA) 500 MG tablet TAKE 1 TABLET BY MOUTH TWICE DAILY 56 tablet 11     lisinopril (ZESTRIL) 20 MG tablet TAKE 1 TABLET BY MOUTH EVERY MORNING 28 tablet 11     loratadine (CLARITIN) 10 MG tablet TAKE 1 TABLET BY MOUTH ONCE DAILY 28 tablet 11     Medication given by intrathecal pump continuous prn Drug # 1: Fentanyl (Sublimaze).    Conc:2000  mcg/mL  Total Dose / 24 hours: 600  mcg  Drug # 2: Bupivacaine (Marcaine) .    Conc:20  mg/mL  Total Dose / 24 hours: 6  mg  Drug # 3: Morphine (Duramorph or Infumorph) .    Conc:3.7  mg/mL  Total Dose / 24 hours: 1.11  mg   Diluent: NS       Menthol, Topical Analgesic, 5 % GEL Externally apply topically daily as needed 113 g 0     metFORMIN (GLUCOPHAGE) 500 MG tablet TAKE 1 TABLET BY MOUTH EVERY EVENING WITH DINNER 28 tablet 11     metoprolol succinate ER (TOPROL-XL) 50 MG 24 hr tablet TAKE 1 TABLET BY MOUTH EVERY MORNING 30 tablet 10     Multiple Vitamins-Minerals (TAB-A-MACY) TABS TAKE 1 TABLET BY MOUTH ONCE DAILY 28 tablet 11     multivitamin, therapeutic (THERA-VIT) TABS tablet Take 1 tablet by mouth daily       mupirocin (BACTROBAN) 2 % external ointment Apply 1 each topically daily nares       nicotine (NICODERM CQ) 14  MG/24HR 24 hr patch APPLY 1 PATCH TOPICALLY DAILY ( EVERY 24 HOURS ) 28 patch 3     nitroGLYcerin (NITROSTAT) 0.4 MG sublingual tablet DISSOLVE ONE TABLET UNDER TONGUE AS NEEDED FOR CHEST PAIN MAY REPEAT EVERY 5 MINUTES FOR 3 DOSES, IF SYMPTOMS PERSIST CALL 911 25 tablet 3     Nutritional Supplements (ENSURE) LIQD DRINK ONE CAN / BOTTLE BY MOUTH TWICE DAILY WITH MEALS 47909 mL 11     oxyCODONE (OXY-IR) 5 MG capsule Take 1-2 capsules by mouth every 4 hours as needed for severe pain       pantoprazole (PROTONIX) 40 MG EC tablet TAKE 1 TABLET BY MOUTH EVERY MORNING 28 tablet 11     phenytoin (DILANTIN) 100 MG capsule TAKE 2 CAPS (200MG) BY MOUTH TWICE A  capsule 11     polyethylene glycol (MIRALAX) 17 GM/SCOOP powder MIX 17GM OF POWDER IN 8OZ OF WATER UNTIL COMPLETELY DISSOLVED. DRINK SOLUTION BY MOUTH IN THE MORNING 510 g 8     pregabalin (LYRICA) 150 MG capsule TAKE 1 CAPSULE BY MOUTH THREE TIMES DAILY 90 capsule 4     risperiDONE (RISPERDAL) 0.5 MG tablet TAKE 1 TABLET BY MOUTH AT BEDTIME 28 tablet 11     SENEXON-S 8.6-50 MG tablet TAKE 1 TABLET BY MOUTH TWICE DAILY 56 tablet 10     solifenacin (VESICARE) 10 MG tablet TAKE 1 TABLET BY MOUTH EVERY MORNING 28 tablet 10     traZODone (DESYREL) 150 MG tablet TAKE 1 TABLET BY MOUTH AT BEDTIME 28 tablet 10     vitamin D3 (CHOLECALCIFEROL) 10 MCG (400 UNIT) capsule Take 2 capsules by mouth daily       Vitamin D3 (VITAMIN D) 10 MCG (400 UNIT) tablet TAKE TWO TABLETS (800 UNITS) BY MOUTH ONCE DAILY 56 tablet 11          Allergies   Allergen Reactions     Bee Venom Anaphylaxis     Penicillins Anaphylaxis     Dilantin [Phenytoin] Other (See Comments)     Generic dilantin only per pt     Iodine Hives     Novocaine [Procaine] Hives     Tositumomab Unknown       SOCIAL HISTORY:    Social History     Socioeconomic History     Marital status:      Spouse name: Not on file     Number of children: 2     Years of education: Not on file     Highest education level: Not on  file   Occupational History     Occupation: Retired   Social Needs     Financial resource strain: Not on file     Food insecurity     Worry: Not on file     Inability: Not on file     Transportation needs     Medical: Not on file     Non-medical: Not on file   Tobacco Use     Smoking status: Current Every Day Smoker     Packs/day: 1.00     Years: 30.00     Pack years: 30.00     Types: Cigarettes, Cigars     Smokeless tobacco: Never Used     Tobacco comment: 1.5 packs per day    Substance and Sexual Activity     Alcohol use: No     Alcohol/week: 0.0 standard drinks     Drug use: No     Sexual activity: Not Currently   Lifestyle     Physical activity     Days per week: Not on file     Minutes per session: Not on file     Stress: Not on file   Relationships     Social connections     Talks on phone: Not on file     Gets together: Not on file     Attends Rastafarian service: Not on file     Active member of club or organization: Not on file     Attends meetings of clubs or organizations: Not on file     Relationship status: Not on file     Intimate partner violence     Fear of current or ex partner: Not on file     Emotionally abused: Not on file     Physically abused: Not on file     Forced sexual activity: Not on file   Other Topics Concern     Parent/sibling w/ CABG, MI or angioplasty before 65F 55M? Not Asked      Service Not Asked     Blood Transfusions Not Asked     Caffeine Concern No     Comment: 1 in the morning     Occupational Exposure Not Asked     Hobby Hazards Not Asked     Sleep Concern Not Asked     Stress Concern Not Asked     Weight Concern Not Asked     Special Diet Not Asked     Back Care Not Asked     Exercise Not Asked     Bike Helmet Not Asked     Seat Belt Not Asked     Self-Exams Not Asked   Social History Narrative    Living in a nursing home (as of 2020) for prosthetic fitting and therapy; usually resides in an [facility].    Two adopted children (Kam and Prashanth) and 3 grandchildren (as of  2020).       FAMILY HISTORY: Reviewed in EMR      REVIEW OF SYSTEMS: Positive for that noted in past medical history and history of present illness and otherwise reviewed in EMR     PHYSICAL EXAM:    Skin intact. No erythema. Sensation intact all dermatomes into the hand to light touch. EPL, FPL, and Intrinsics intact. Right shoulder active motion is FE to 90 (PROM to 150), AROM abduction to 70 (PROM 90), ER at side to 80, and IR to behind her head (exam limited by sitting in her chair). Left shoulder active motion is FE to 90 (PROM to 150), AROM abduction to 70 (PROM 90), ER to 80, and IR to behind her head.  Negative Neer and Underwood, but exam is severely limited by pain and lack of ROM. Mild TTP pain on palpation over the AC joint. Mild pain on palpation over the long head of the biceps.     Special tests:    Biceps:    - Mild TTP at bicipital groove   - Speeds: Negative   - Yergasons: Negative      AC joint:   - Positive TTP at AC joint   - Cross-body adduction: Positive      Rotator Cuff   - Domonique's test (Supra): Negative      Labrum/SLAP   - Jay: Positive      Impingement:   - Neer: Negative   - Underwood: Negative     IMAGING:  XR R shoulder reviewed today 2/17/21:  -Demonstrates degenerative changes of the AC joint with mild degenerative changes of the glenohumeral joint indicating mild glenohumeral arthritis. The humeral head is appropriately located in the glenoid without any evidence of fracture/dislocation. There is some evidence of calcific tendinosis of the rotator cuff best visualized on the R axillary view    ASSESSMENT:    1. R AC joint arthritis  2. R calcific tendinosis of rotator cuff  3. Status post left AKA    PLAN:  We discussed the imaging and exam findings today with the patient. We reviewed the risks and benefits of both nonop and possible operative options. We advised formal non-surgical treatment including the possibility of injections. She would like to proceed with those today.  1. AC  joint and subacromial steroid injection in clinic  2. Will follow-up with nonoperative musculoskeletal team as needed.    Ariadna Mcneill MD

## 2021-02-17 NOTE — NURSING NOTE
[address] [address]  Dept: [phone]  ______________________________________________________________________________    Patient: Sonya Foote   : 1950   MRN: [id number]   2021    INVASIVE PROCEDURE SAFETY CHECKLIST    Date: 21   Procedure:L AC joint CSI and L subacromial CSI US guided with kenalog  Patient Name: Sonya Foote  MRN: [id number]  YOB: 1950    Action: Complete sections as appropriate. Any discrepancy results in a HARD COPY until resolved.     PRE PROCEDURE:  Patient ID verified with 2 identifiers (name and  or MRN): Yes  Procedure and site verified with patient/designee (when able): Yes  Accurate consent documentation in medical record: Yes  H&P (or appropriate assessment) documented in medical record: Yes  H&P must be up to 20 days prior to procedure and updates within 24 hours of procedure as applicable: NA  Relevant diagnostic and radiology test results appropriately labeled and displayed as applicable: Yes  Procedure site(s) marked with provider initials: NA    TIMEOUT:  Time-Out performed immediately prior to starting procedure, including verbal and active participation of all team members addressing the following:Yes  * Correct patient identify  * Confirmed that the correct side and site are marked  * An accurate procedure consent form  * Agreement on the procedure to be done  * Correct patient position  * Relevant images and results are properly labeled and appropriately displayed  * The need to administer antibiotics or fluids for irrigation purposes during the procedure as applicable   * Safety precautions based on patient history or medication use    DURING PROCEDURE: Verification of correct person, site, and procedures any time the responsibility for care of the patient is transferred to another member of the care team.       Prior to injection, verified patient identity using  patient's name and date of birth.  Due to injection administration, patient instructed to remain in clinic for 15 minutes  afterwards, and to report any adverse reaction to me immediately.    Joint injection was performed.      Drug Amount Wasted:  None.  Vial/Syringe: Single dose vial  Expiration Date:  4/24 - Lidocaine  9/22 - ligia Goodman, [facility]  February 17, 2021

## 2021-02-19 NOTE — TELEPHONE ENCOUNTER
Prior Authorization Approval    Authorization Effective Date: 1/20/2021  Authorization Expiration Date: 2/19/2022  Medication: solifenacin (VESICARE) 10 MG tablet- APPROVED  Approved Dose/Quantity:28 tabs  Reference #: [id number]   Insurance Company: [employer] - Phone [phone] Fax [phone]  Expected CoPay:       CoPay Card Available:      Foundation Assistance Needed:    Which Pharmacy is filling the prescription (Not needed for infusion/clinic administered): [facility] - [address]  Pharmacy Notified: Yes  Patient Notified: Yes

## 2021-02-19 NOTE — TELEPHONE ENCOUNTER
PA Initiation    Medication: solifenacin (VESICARE) 10 MG tablet- INITIATED  Insurance Company: [employer] - Phone [phone] Fax [phone]  Pharmacy Filling the Rx: [facility] - [address]  Filling Pharmacy Phone: [phone]  Filling Pharmacy Fax: [phone]  Start Date: 2/19/2021

## 2021-02-22 ENCOUNTER — TELEPHONE (OUTPATIENT)
Dept: SLEEP MEDICINE | Facility: CLINIC | Age: 71
End: 2021-02-22

## 2021-02-22 DIAGNOSIS — F51.04 CHRONIC INSOMNIA: ICD-10-CM

## 2021-02-22 DIAGNOSIS — G47.39 COMPLEX SLEEP APNEA SYNDROME: ICD-10-CM

## 2021-02-22 DIAGNOSIS — I50.22 CHRONIC SYSTOLIC HEART FAILURE (H): ICD-10-CM

## 2021-02-22 DIAGNOSIS — G47.33 OSA (OBSTRUCTIVE SLEEP APNEA): ICD-10-CM

## 2021-02-22 DIAGNOSIS — J44.9 CHRONIC OBSTRUCTIVE PULMONARY DISEASE, UNSPECIFIED COPD TYPE (H): Primary | ICD-10-CM

## 2021-02-22 PROBLEM — R42 DIZZINESS: Status: ACTIVE | Noted: 2020-12-26
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
## 2021-02-22 PROBLEM — M47.812 ARTHROPATHY OF CERVICAL FACET JOINT: Status: ACTIVE | Noted: 2020-10-16

## 2021-02-22 PROBLEM — R07.1 CHEST PAIN ON BREATHING: Status: RESOLVED | Noted: 2020-06-10 | Resolved: 2021-02-22

## 2021-02-22 PROBLEM — N31.9 NEUROGENIC BLADDER: Status: ACTIVE | Noted: 2020-05-21

## 2021-02-22 RX ORDER — LIDOCAINE HYDROCHLORIDE 10 MG/ML
3 INJECTION, SOLUTION EPIDURAL; INFILTRATION; INTRACAUDAL; PERINEURAL
Status: DISCONTINUED | OUTPATIENT
Start: 2021-02-17 | End: 2021-07-20

## 2021-02-22 RX ORDER — TRIAMCINOLONE ACETONIDE 40 MG/ML
20 INJECTION, SUSPENSION INTRA-ARTICULAR; INTRAMUSCULAR
Status: DISCONTINUED | OUTPATIENT
Start: 2021-02-17 | End: 2021-07-20

## 2021-02-22 RX ORDER — LIDOCAINE HYDROCHLORIDE 10 MG/ML
2 INJECTION, SOLUTION EPIDURAL; INFILTRATION; INTRACAUDAL; PERINEURAL
Status: DISCONTINUED | OUTPATIENT
Start: 2021-02-17 | End: 2021-07-20

## 2021-02-22 NOTE — PROGRESS NOTES
Medium Joint Injection/Arthrocentesis: R acromioclavicular    Date/Time: 2/17/2021 12:16 PM  Performed by: Ronald Hernandez DO  Authorized by: Ronald Hernandez DO     Indications:  Pain  Needle Size:  22 G  Guidance: ultrasound    Approach:  Superior  Location:  Shoulder  Site:  R acromioclavicular  Medications:  20 mg triamcinolone 40 MG/ML; 2 mL lidocaine (PF) 1 %  Outcome:  Tolerated well, no immediate complications  Procedure discussed: discussed risks, benefits, and alternatives    Consent Given by:  Patient  Timeout: timeout called immediately prior to procedure    Prep: patient was prepped and draped in usual sterile fashion

## 2021-02-22 NOTE — TELEPHONE ENCOUNTER
Mild Sleep obstructive sleep apnea by sleep study 2012.  History of complex apnea with 'treatment emergent central apneas' by titration study 2012. Treated with initially ASV, later PAP after EF worsened. Study 2/2017 suggested severe incompletely treated obstructive sleep apnea at CPAP 12 cmH20.  More recent study 11/2019 with acceptable titration, but probable underlying periodic breathing. Multiple comorbidities.      She has been off CPAP after losing her machine for non-adherence due to nasal congestion and the fact her total sleep time is short (5-6 hours) under best of circumstances related to her chronic pain.     Component      Latest Ref Rng & Units 6/10/2020   Ph Venous      7.32 - 7.43 pH 7.34   PCO2 Venous      40 - 50 mm Hg 58 (H)   PO2 Venous      25 - 47 mm Hg 32   Bicarbonate Venous      21 - 28 mmol/L 31 (H)   Base Excess Venous      mmol/L 3.5   FIO2       21        Now that covid lab restrictions are eased:   Polysomnogram with all night titration of bilevelPAP starting at 10/7 cmH20 with Transcutaneous CO2 Monitoring (TCM). Could consider use of ASV if this is ineffective because of significant exacerbation of [place]. Last study was done at [place], patient claims independence in cares, no tech notes suggest otherwise from last study    Orders placed

## 2021-02-24 ENCOUNTER — RECORDS - HEALTHEAST (OUTPATIENT)
Dept: LAB | Facility: CLINIC | Age: 71
End: 2021-02-24

## 2021-02-24 ENCOUNTER — TELEPHONE (OUTPATIENT)
Dept: INTERNAL MEDICINE | Facility: CLINIC | Age: 71
End: 2021-02-24

## 2021-02-24 ENCOUNTER — TRANSFERRED RECORDS (OUTPATIENT)
Dept: HEALTH INFORMATION MANAGEMENT | Facility: CLINIC | Age: 71
End: 2021-02-24

## 2021-02-24 ENCOUNTER — TELEPHONE (OUTPATIENT)
Dept: UROLOGY | Facility: CLINIC | Age: 71
End: 2021-02-24

## 2021-02-24 DIAGNOSIS — N39.0 URINARY TRACT INFECTION WITHOUT HEMATURIA, SITE UNSPECIFIED: Primary | ICD-10-CM

## 2021-02-24 PROBLEM — N39.41 URGE INCONTINENCE OF URINE: Status: ACTIVE | Noted: 2021-02-24

## 2021-02-24 PROBLEM — N32.81 OVERACTIVE BLADDER: Status: ACTIVE | Noted: 2021-02-24

## 2021-02-24 LAB
ALBUMIN UR-MCNC: NEGATIVE MG/DL
APPEARANCE UR: CLEAR
BACTERIA #/AREA URNS HPF: ABNORMAL HPF
BILIRUB UR QL STRIP: NEGATIVE
COLOR UR AUTO: YELLOW
GLUCOSE UR STRIP-MCNC: NEGATIVE MG/DL
HGB UR QL STRIP: ABNORMAL
KETONES UR STRIP-MCNC: NEGATIVE MG/DL
LEUKOCYTE ESTERASE UR QL STRIP: ABNORMAL
MUCOUS THREADS #/AREA URNS LPF: ABNORMAL LPF
NITRATE UR QL: NEGATIVE
PH UR STRIP: 7 [PH] (ref 4.5–8)
RBC #/AREA URNS AUTO: ABNORMAL HPF
SARS-COV-2 PCR COMMENT: NORMAL
SARS-COV-2 RNA SPEC QL NAA+PROBE: NEGATIVE
SARS-COV-2 VIRUS SPECIMEN SOURCE: NORMAL
SP GR UR STRIP: 1.01 (ref 1–1.03)
SQUAMOUS #/AREA URNS AUTO: ABNORMAL LPF
UROBILINOGEN UR STRIP-ACNC: ABNORMAL
WBC #/AREA URNS AUTO: ABNORMAL HPF
WBC CLUMPS #/AREA URNS HPF: PRESENT /[HPF]

## 2021-02-24 NOTE — CONFIDENTIAL NOTE
Patient is scheduled for surgery with Dr. Evan Monzon with [facility]    Date of Surgery: 7/8/21    Location: [place] OR    Informed patient they will need an adult  yes    H&P: Scheduled with PCP    Additional imaging/appointments: patient states she gets Covid test every Tuesday at her assistant living (7/6/21) she will check to make sure results can be received back prior to surgery    Surgery packet: to be mailed by 2/26/21     Additional comments: n/a

## 2021-02-24 NOTE — TELEPHONE ENCOUNTER
Pascale from [facility]. Patient thinks she has UTI and usually is right about that. Has catheter. Small amount of blood with urine. Wanting orders for UA/UC if ok?No fever. Symptoms started 2 days ago. Needs order faxed with physical or electronic signature. They use [facility] lab and would fax us the results.    P[facility]ale # [phone]  Fax [phone]

## 2021-02-27 LAB — BACTERIA SPEC CULT: ABNORMAL

## 2021-03-01 RX ORDER — NITROFURANTOIN 25; 75 MG/1; MG/1
100 CAPSULE ORAL 2 TIMES DAILY
Qty: 14 CAPSULE | Refills: 0 | Status: SHIPPED | OUTPATIENT
Start: 2021-03-01 | End: 2021-04-12

## 2021-03-01 NOTE — TELEPHONE ENCOUNTER
Per provider. Results positive for UTI. Would like patient to take macrobid 100 mg po BID x 7 days. Care facility informed. Please sign rx order.

## 2021-03-16 NOTE — TELEPHONE ENCOUNTER
Please verify refill request as this appears to be a medicine that is prescribed from a surgical procedure perspective as per instructions.  It is unclear if this should be going to the patient's surgeon as clinically needed

## 2021-03-16 NOTE — TELEPHONE ENCOUNTER
Routing refill request to provider for review/approval because:  Drug not on the [facility] refill protocol   Medication is reported/historical

## 2021-03-17 RX ORDER — MUPIROCIN 20 MG/G
OINTMENT TOPICAL
Qty: 22 G | Refills: 97 | OUTPATIENT
Start: 2021-03-17

## 2021-03-22 ENCOUNTER — NURSE TRIAGE (OUTPATIENT)
Dept: NURSING | Facility: CLINIC | Age: 71
End: 2021-03-22

## 2021-03-22 NOTE — TELEPHONE ENCOUNTER
Additional Information    Negative: Nursing judgment    Negative: Nursing judgment    Negative: Nursing judgment    Negative: Nursing judgment    Information only question and nurse able to answer    Protocols used: NO PROTOCOL AVAILABLE - INFORMATION ONLY-A-[place]    Patient would like an earlier covid testing time than her 3:30 p.m appointment on 3/31/21.  Writer transferred caller directly to covid scheduling line.  No further needs/questions were expressed.    Hannah Lawrence RN  [facility]

## 2021-03-24 ENCOUNTER — TELEPHONE (OUTPATIENT)
Dept: OPHTHALMOLOGY | Facility: CLINIC | Age: 71
End: 2021-03-24

## 2021-03-24 DIAGNOSIS — J44.9 CHRONIC OBSTRUCTIVE PULMONARY DISEASE, UNSPECIFIED COPD TYPE (H): ICD-10-CM

## 2021-03-24 NOTE — TELEPHONE ENCOUNTER
[facility] Call Center    Phone Message    May a detailed message be left on voicemail: no     Reason for Call: Other: Patient is scheduled with Dr. Burnett on 5/10/2021 at 9:00 AM. She is requesting a Map/Directions and reminder letter be mailed to her so, she can give them to her .     Action Taken: Message routed to:  [facility] ([facility]): CLINIC COORDINATORS-EYE-DENTAL-ENT- [[phone]]    Travel Screening: Not Applicable

## 2021-03-24 NOTE — TELEPHONE ENCOUNTER
"Requested Prescriptions   Signed Prescriptions Disp Refills    ADVAIR DISKUS 250-50 MCG/DOSE inhaler 60 each 1     Sig: INHALE 1 PUFF BY MOUTH INTO THE LUNGS TWICE DAILY       Long-Acting Beta Agonist Inhalers Protocol  Failed - 3/24/2021  3:04 PM        Failed - Order for Serevent, Striverdi, or Foradil and pt has steroid inhaler        Passed - Patient is age 12 or older        Passed - Recent (12 mo) or future (30 days) visit within the authorizing provider's specialty     Patient has had an office visit with the authorizing provider or a provider within the authorizing providers department within the previous 12 mos or has a future within next 30 days. See \"Patient Info\" tab in inbasket, or \"Choose Columns\" in Meds & Orders section of the refill encounter.              Passed - Medication is active on med list       Inhaled Steroids Protocol Passed - 3/24/2021  3:04 PM        Passed - Patient is age 12 or older        Passed - Recent (12 mo) or future (30 days) visit within the authorizing provider's specialty     Patient has had an office visit with the authorizing provider or a provider within the authorizing providers department within the previous 12 mos or has a future within next 30 days. See \"Patient Info\" tab in inbasket, or \"Choose Columns\" in Meds & Orders section of the refill encounter.              Passed - Medication is active on med list           "

## 2021-03-26 ENCOUNTER — HOSPITAL ENCOUNTER (OUTPATIENT)
Dept: SPEECH THERAPY | Facility: CLINIC | Age: 71
Setting detail: THERAPIES SERIES
End: 2021-03-26
Attending: INTERNAL MEDICINE
Payer: COMMERCIAL

## 2021-03-26 DIAGNOSIS — R13.10 DYSPHAGIA, UNSPECIFIED TYPE: ICD-10-CM

## 2021-03-26 PROCEDURE — 92610 EVALUATE SWALLOWING FUNCTION: CPT | Mod: GN | Performed by: SPEECH-LANGUAGE PATHOLOGIST

## 2021-03-26 PROCEDURE — 92526 ORAL FUNCTION THERAPY: CPT | Mod: GN | Performed by: SPEECH-LANGUAGE PATHOLOGIST

## 2021-03-26 NOTE — DISCHARGE INSTRUCTIONS
You were seen today for a clinical swallow evaluation.  You have been found to be at increased risk for aspiration and require a modified diet.  You are most appropriate for a dysphagia diet diet level 2 and thin liquids.  No straws.  You will need to be upright for intake and should take small bites/sips at a slow pace while alternating consistencies.  Pills should be served whole in applesauce or yogurt (pureed texture).  Please see attached handout for items that are allowed and items that should be avoided on this diet.  Consider adding sauces, gravies, and syrups to moisten food items.      You also need a video swallow study to further assess the current function of your pharyngeal swallow.  We will request orders from your referring physician.

## 2021-03-26 NOTE — IP AVS SNAPSHOT
After Visit Summary Template Not Found    This Print Group is only intended to be used in the After Visit Summary and can only be used in a report that uses a released After Visit Summary Template.                       MRN:[id number]                      After Visit Summary   3/26/2021    Sonya Foote    MRN: [id number]           Visit Information        Provider Department      3/26/2021  1:15 PM Halley Meraz, SLP [facility]        Your next 10 appointments already scheduled    Mar 29, 2021  3:30 PM  Pre-procedure Covid with  COVID LAB  [facility] [place] ([facility] ) [address] [place] [id number] [phone]   Park in the West lot across from the main entrance on [address].  Do NOT go to the [facility] trailer.  Your specimen collection will occur inside the [facility] building.  Enter the main front door of the [facility] building on [address], where screening will take place and you will be directed to the 1st floor laboratory check- in desk.     Mar 31, 2021  8:00 PM  PSG Titration w/TCM with SLEEP STUDY  4  [facility] [place] ([facility] ) [address] [place] [address] [phone]      Apr 14, 2021  2:55 PM  (Arrive by 2:40 PM)  Return Visit with Rl Barroso MD  [facility] [place] ([facility] ) [address] [place] [phone]      Apr 19, 2021 11:00 AM  Telephone Visit with [facility] ED TRIAGE RESOURCE  [facility] [address] [facility] [place] [phone]   Please Note: This is a virtual visit; there is no need to  come to the facility.       Apr 28, 2021 10:00 AM  Telephone Visit with Gustavo Wright MD  [facility] [place] ([facility] [place] ) [address] [facility] [address]  [facility] [phone]   Please Note: This is a virtual visit; there is no need to come to the facility.       May 10, 2021  9:00 AM  RETURN GLAUCOMA with Mel Burnett MD  [facility] ([email or web address] ) [address]  [facility] [address]  [facility] [phone]   Please bring: Your most current eyeglasses or your last eye glass prescription        Your clinic visit may include a thorough examination of your eyesight.      Your eyes may be dilated as part of the examination.  To dilate your eyes, the physician or technician uses eye drops to make your pupils larger.  After the drops are administered, there will be a wait of 20 to 45 minutes for dilation to occur before the examination continues.  You may wish to have someone come along with you to drive you home.  You will be more comfortable wearing sunglasses when your eyes are dilated.   Please plan on your visit lasting anywhere from 2-4 hours. This may vary based upon what diagnostic testing you may need.            Jul 08, 2021  Procedure with Loki Gordon MD  [facility] (--) [address] [facility] [phone]        Further instructions from your care team       You were seen today for a clinical swallow evaluation.  You have been found to be at increased risk for aspiration and require a modified diet.  You are most appropriate for a dysphagia diet diet level 2 and thin liquids.  No straws.  You will need to be upright for intake and should take small bites/sips at a slow pace while alternating consistencies.  Pills should be served whole in applesauce or yogurt (pureed texture).  " Please see attached handout for items that are allowed and items that should be avoided on this diet.  Consider adding sauces, gravies, and syrups to moisten food items.      You also need a video swallow study to further assess the current function of your pharyngeal swallow.  We will request orders from your referring physician.      [employer] Information    [employer] lets you send messages to your doctor, view your test results, renew your prescriptions, schedule appointments and more. To sign up, go to [email or web address] . Click on \"Log in\" on the left side of the screen, which will take you to the Welcome page. Then click on \"Sign up Now\" on the right side of the page.     You will be asked to enter the access code listed below, as well as some personal information. Please follow the directions to create your username and password.     Your access code is: [email or web address]  Expires: 2021  1:40 PM     Your access code will  in 60 days. If you need help or a new code, please call your   [facility] clinic or [phone].       Care EveryWhere ID    This is your Care EveryWhere ID. This could be used by other organizations to access your [place] medical records  [id number]       Equal Access to Services    KARI CARREON : Hadii aad ku hadasho Sostephali, waaxda luqadaha, qaybta kaalmada adeegyada, tonie marroquin. So [facility] [phone].    ATENCIÓN: Si habla español, tiene a briones disposición servicios gratuitos de asistencia lingüística. Llame al [phone].    We comply with applicable federal and state civil rights laws, including the [place] Human Rights Act. We do not discriminate on the basis of race, color, creed, Yarsanism, national origin, marital status, age, disability, sex, sexual orientation, or gender identity.    If you would like an itemization of your charges they will now be available in [employer] 30 days after discharge. To access the itemized " statements in [employer] go to billing/billing summary. From there select view account. There will be multiple tabs showing an overview of your account, detail, payments, and communications. From the communications tab you can see your monthly statements, your itemized statements, and any billing letters generated for your account. If you do not have a [employer] account and need help getting access please contact [employer] support at [phone].  If you would prefer to have your itemized statements mailed please contact our automated itemized bill request line at [phone] option  2.

## 2021-03-26 NOTE — PROGRESS NOTES
OUTPATIENT SWALLOW  EVALUATION  PLAN OF TREATMENT FOR OUTPATIENT REHABILITATION  (COMPLETE FOR INITIAL CLAIMS ONLY)  Patient's Last Name, First Name, M.I.  YOB: 1950  Sonya Foote     Provider's Name   BRIANNA Truong   Medical Record No.  [id number]     Start of Care Date:  03/26/21   Onset Date:  (P) 02/02/21   Type:     ___PT   ____OT  ___X_SLP Medical Diagnosis:  (P) Dysphagia      Treatment Diagnosis:  (P) Mild oropharyngeal dysphagia  Visits from SOC:  1     _________________________________________________________________________________  Plan of Treatment/Functional Goals:  Planned Therapy Interventions: (P) Dysphagia Treatment  Dysphagia treatment: (P) Modified diet education, Instruction of safe swallow strategies, Compensatory strategies for swallowing                     Goals   1. Goal Identifier: (P) 1       Goal Description: (P) Pt will demonstrate understanding and implementation of aspiration precautions and safe swallow strategies to maximize ease and safety of swallow as determined by the treating clinician.         Target Date: (P) 06/24/21           2. Goal Identifier: (P) 2       Goal Description: (P) Pt will complete a video swallow study by 6/24/2021.       Target Date: (P) 06/24/21            Therapy Frequency: (P) other (see comments)(TBD based on video swallow study )  Predicted Duration of Therapy Intervention (days/wks): (P) TBD     Halley Meraz, SLP       I CERTIFY THE NEED FOR THESE SERVICES FURNISHED UNDER        THIS PLAN OF TREATMENT AND WHILE UNDER MY CARE     (Physician co-signature of this document indicates review and certification of the therapy plan).                  Certification date from: (P) 03/26/21 Certification date to: (P) 06/24/21          Referring Physician: Allan Casey (P)          Initial Assessment        See Epic Evaluation Start Of Care Date:  03/26/21 03/26/21 1315   General Information   Type Of Visit Initial   Start Of Care Date 03/26/21   Referring Physician  Allan Casey         Orders Evaluate And Treat   Orders Comment Clinical swallow study    Medical Diagnosis Dysphagia    Onset Of Illness/injury Or Date Of Surgery 02/02/21   Precautions/limitations Fall Precautions;Swallowing Precautions   Hearing WFL for conversational speech production    Pertinent History of Current Problem/OT: Additional Occupational Profile Info Sonya Foote is a 70 yo RHD Female w PMHx of chronic pain syndrome s/p IT pump ([facility]), COPD, DM2, HTN, CAD, hx strokes, PAD s/p bilat LE amputations AKAs, cervical spondylosis, tobacco use disorder, visual impairment who present with c/o dysphagia.  Pt has a history of dysphagia, reporting changes to both speech and swallowing since her strokes.  Pt reports her last video swallow study was 5-6 years ago and that she has required a mechanical soft diet since that time.  Pt reports that she needs to be careful with liquids or that she will choke.  She also notes that solids are challenging because they get caught in her throat.  If this occurs, she notes that she sometimes has to throw up or go to the ED to get the items manually extracted.  Pt notes reduced swallow function recently.  The EAT-10 was administered this date with pt scoring 36/40.  Pt reports that she is positive for weight loss due to increased difficulty with swallowing.  No recent PNA's.  History of reflux which she manages with medication.     Respiratory Status Room air   Prior Level Of Function Swallowing   Prior Level Of Function Comment Mechanical soft and thin liquids    Patient Role/employment History Disabled   Living Environment Assisted Living   General Observations Pt pleasant and cooperative.    Patient/family Goals To avoid PNA and trips to the ED.    Fall Risk Screen   Fall screen completed by SLP   Have you fallen 2 or more times  in the past year? Yes   Have you fallen and had an injury in the past year? Yes   Fall screen comments Wheel chair bound as baseline    Clinical Swallow Evaluation   Oral Musculature generally intact   Structural Abnormalities none present   Dentition edentulous, does not have dentures  (Has dentures but are currrently ill fitting )   Mucosal Quality adequate   Mandibular Strength and Mobility intact   Oral Labial Strength and Mobility WFL   Lingual Strength and Mobility other (see comments)  (Lingual deviation to the L )   Laryngeal Function Cough;Throat clear;Swallow;Voicing initiated   Oral Musculature Comments WFL   Additional Documentation Yes   Additional evaluation(s) completed today Recommended   Rationale for completing additional evaluation View pharyngeal phase, determine most effective diet, and view extent of residue    Clinical Swallow Eval: Thin Liquid Texture Trial   Mode of Presentation, Thin Liquids cup;straw;fed by clinician;self-fed   Volume of Liquid or Food Presented 4 oz water    Oral Phase of Swallow WFL   Pharyngeal Phase of Swallow intact   Diagnostic Statement Functional swallow for thin liquids when taking single sips via cup.  Reduced coordination, report of discomfort, and audible swallow with sips via straw.    Clinical Swallow Eval: Puree Solid Texture Trial   Mode of Presentation, Puree spoon;self-fed   Volume of Puree Presented 1 oz applesauce    Oral Phase, Puree WFL   Pharyngeal Phase, Puree intact   Diagnostic Statement Functional swallow for pureed textures.  No oral residue observed.    Clinical Swallow Eval: Solid Food Texture Trial   Mode of Presentation, Solid self-fed   Volume of Solid Food Presented bites of cracker x2    Oral Phase, Solid WFL   Pharyngeal Phase, Solid reduction in laryngeal movement   Diagnostic Statement Increased time for mastication due to lack of dentition.  Pt eliciting an effortful swallow and tolerated with no overt s/s of aspiration.  Pt reports  sensation of item remaining in pharynx and using liquid wash to clear.    Swallow Compensations   Swallow Compensations Reduce amounts;Pacing;Alternate viscosity of consistencies   Educational Assessment   Barriers to Learning No barriers   Esophageal Phase of Swallow   Patient reports or presents with symptoms of esophageal dysphagia Yes   General Therapy Interventions   Planned Therapy Interventions Dysphagia Treatment   Dysphagia treatment Modified diet education;Instruction of safe swallow strategies;Compensatory strategies for swallowing   Swallow Eval: Clinical Impressions   Skilled Criteria for Therapy Intervention Skilled criteria met.  Treatment indicated.   Functional Assessment Scale (FAS) 5   Treatment Diagnosis Mild oropharyngeal dysphagia    Diet texture recommendations Dysphagia diet level 2;Thin liquids  (Pills served whole in purees)   Recommended Feeding/Eating Techniques alternate between small bites and sips of food/liquid;maintain upright posture during/after eating for 30 mins;small sips/bites   Rehab Potential good, to achieve stated therapy goals   Therapy Frequency other (see comments)  (TBD based on video swallow study )   Predicted Duration of Therapy Intervention (days/wks) TBD    Anticipated Discharge Disposition home w/ assist   Risks and Benefits of Treatment have been explained. Yes   Patient, family and/or staff in agreement with Plan of Care Yes   Clinical Impression Comments Pt seen for a clinical swallow evaluation.  Pt presents with mild oropharyngeal dysphagia.  Pt tolerated trials of thin liquids, purees, and solid textures with no overt s/s of aspiration.  Pt noted to take small amounts and elicit a deliberate swallow upon items of increased viscosity.  Pt used a liquid wash to clear globus sensation following solid textures.  Pt is appropriate for a dysphagia diet level 2 and thin liquids.  Foods should be soft and moistened with additional sauces, gravies, or syrup.  No  straws.  Pt should be upright for intake and will need to take small bites/sips, pace self, and alternate consistenices.  Pills should be served whole in pureed textures.  It is also recommended that pt complete a video swallow study to fully assess pharyngeal phase of swallow in the setting of c/o worsening dysphagia, globus, and concern for swallow safety.  Pt is in agreement with this recommendation.     Swallow Goals   SLP Swallow Goals 1;2   Swallow Goal 1   Goal Identifier 1   Goal Description Pt will demonstrate understanding and implementation of aspiration precautions and safe swallow strategies to maximize ease and safety of swallow as determined by the treating clinician.     Target Date 06/24/21   Swallow Goal 2   Goal Identifier 2   Goal Description Pt will complete a video swallow study by 6/24/2021.   Target Date 06/24/21   Total Session Time   SLP Eval: oral/pharyngeal swallow function, clinical minutes (80761) 20   Total Evaluation Time 20   Therapy Certification   Certification date from 03/26/21   Certification date to 06/24/21   Medical Diagnosis Dysphagia    Certification I certify the need for these services furnished under this plan of treatment and while under my care.  (Physician co-signature of this document indicates review and certification of the therapy plan).     Thank you for the referral of Sonya Foote.  If you have any questions about this report, please contact me using the information below.         Halley Meraz MS CCC-SLP    Speech Language Pathologist   Clinical Specialist Level 1   Rehabilitation Services  [facility] [address]  [address] [place] [id number]  Office: [phone]  [email or web address]

## 2021-03-29 DIAGNOSIS — Z20.822 COVID-19 RULED OUT: ICD-10-CM

## 2021-03-29 LAB
SARS-COV-2 RNA RESP QL NAA+PROBE: NORMAL
SPECIMEN SOURCE: NORMAL

## 2021-03-29 PROCEDURE — U0005 INFEC AGEN DETEC AMPLI PROBE: HCPCS | Performed by: PATHOLOGY

## 2021-03-29 PROCEDURE — U0003 INFECTIOUS AGENT DETECTION BY NUCLEIC ACID (DNA OR RNA); SEVERE ACUTE RESPIRATORY SYNDROME CORONAVIRUS 2 (SARS-COV-2) (CORONAVIRUS DISEASE [COVID-19]), AMPLIFIED PROBE TECHNIQUE, MAKING USE OF HIGH THROUGHPUT TECHNOLOGIES AS DESCRIBED BY CMS-2020-01-R: HCPCS | Performed by: PATHOLOGY

## 2021-03-30 LAB
LABORATORY COMMENT REPORT: NORMAL
SARS-COV-2 RNA RESP QL NAA+PROBE: NEGATIVE
SPECIMEN SOURCE: NORMAL

## 2021-03-31 ENCOUNTER — TRANSFERRED RECORDS (OUTPATIENT)
Dept: HEALTH INFORMATION MANAGEMENT | Facility: CLINIC | Age: 71
End: 2021-03-31

## 2021-03-31 ENCOUNTER — THERAPY VISIT (OUTPATIENT)
Dept: SLEEP MEDICINE | Facility: CLINIC | Age: 71
End: 2021-03-31
Payer: COMMERCIAL

## 2021-03-31 DIAGNOSIS — G47.39 COMPLEX SLEEP APNEA SYNDROME: ICD-10-CM

## 2021-03-31 DIAGNOSIS — J44.9 CHRONIC OBSTRUCTIVE PULMONARY DISEASE, UNSPECIFIED COPD TYPE (H): ICD-10-CM

## 2021-03-31 DIAGNOSIS — I50.22 CHRONIC SYSTOLIC HEART FAILURE (H): ICD-10-CM

## 2021-03-31 DIAGNOSIS — G47.33 OSA (OBSTRUCTIVE SLEEP APNEA): ICD-10-CM

## 2021-03-31 DIAGNOSIS — F51.04 CHRONIC INSOMNIA: ICD-10-CM

## 2021-03-31 PROCEDURE — 95810 POLYSOM 6/> YRS 4/> PARAM: CPT | Performed by: INTERNAL MEDICINE

## 2021-04-01 NOTE — PROGRESS NOTES
Completed a all night titration PSG per provider order.    Preliminary AHI 14.  A final therapeutic PAP pressure was not achieved.    Supine REM was seen on therapeutic pressure.    Patient reports feeling refreshed in AM.

## 2021-04-08 LAB — SLPCOMP: NORMAL

## 2021-04-08 NOTE — PROGRESS NOTES
Patient does not have follow-up until 4/28.  She can keep that appointment  In interim recommend trial of Bilevel PAP 10/7 cmH20  Orders placed  Suspect will need an ASV titration

## 2021-04-08 NOTE — PROCEDURES
" SLEEP STUDY INTERPRETATION  TITRATION STUDY      Patient: SHARATH MERCEDES  YOB: 1950  Study Date: 3/31/2021  MRN: [id number]  Referring Provider: Self  Ordering Provider: Gustavo Wright MD    Indications for Polysomnography: The patient is a 71 year old Female who is 5' 3\" and weighs 125.0 lbs. Her BMI is 22.1, [place] sleepiness scale 11 and neck circumference is 36 cm. A polysomnogram with PAP titration was performed to correct for history of mild obstructive sleep apnea by sleep study 2012.  History of complex apnea with 'treatment emergent central apneas' by titration study 2012. Treated with initially ASV, later PAP after EF worsened. Study 2/2017 suggested severe incompletely treated obstructive sleep apnea at CPAP 12 cmH20.  More recent study 11/2019 with acceptable titration, but probable underlying periodic breathing. Multiple comorbidities. Has lost PAP machine twice due to non-adherence.        Polysomnogram Data: A full night polysomnogram recorded the standard physiologic parameters including EEG, EOG, EMG, ECG, nasal and oral airflow. Respiratory parameters of chest and abdominal movements were recorded with respiratory inductance plethysmography. Oxygen saturation was recorded by pulse oximetry. Hypopnea scoring rule used: 1B 4%.    Treatment PSG  Sleep Architecture:   The total recording time of the polysomnogram was 551.4 minutes. The total sleep time was 518.5 minutes. Sleep latency was decreased at 1.5 minutes without the use of a sleep aid. REM latency was 57.5 minutes. Arousal index was increased at 39.3 arousals per hour. Sleep efficiency was normal at 94.0%. Wake after sleep onset was 31.0 minutes. The patient spent 4.1% of total sleep time in Stage N1, 53.2% in Stage N2, 23.6% in Stage N3, and 19.0% in REM.     Respiration: Patient slept supine with 4 pillows    The patient was titrated at pressures ranging from 10/7 cmH2O up to [place] 11/7/0 cmH2O. Due to emergent central events " she was switched to ASV. The final pressure achieved was ASV 10/0/20 cmH2O with a residual AHI of 65.1 events per hour. Events were predominantly central apneas and hypopneas with intermittent/poorly consolidated periodic pattern.     This titration was considered complex, unacceptable    Sustained Sleep Associated Hypoventilation - Transcutaneous carbon dioxide monitoring was used, however no clear baseline was achieved prior to onset. Baseline after sleep onset was 50mmHg.  Highest TCM was 57.1 mmHg and 113.7 minutes was spent at or greater than 55 mmHg.    Sleep Associated Hypoxemia - (Greater than 5 minutes O2 sat at or below 88%) was not present. Baseline oxygen saturation was 95.3%. Lowest oxygen saturation was 86.6%. Time spent less than or equal to 88% was 0.4 minutes. Time spent less than or equal to 89% was 1.8 minutes.    Movement Activity:     Periodic Limb Activity - There were 19 PLMs during the entire study. The PLM index was 2.2 movements per hour. The PLM Arousal Index was 0.5 per hour.    REM EMG Activity - Excessive transient/sustained muscle activity was not present.    Nocturnal Behavior - Abnormal sleep related behaviors were not noted     Bruxism - None apparent.    Cardiac Summary:   The average pulse rate was 61.3 bpm. The minimum pulse rate was 53.0 bpm while the maximum pulse rate was 97.3 bpm. Arrhythmias were not noted.      Assessment:     Unacceptable titration.     Patient appeared best at 10/7 cmH2o. Obstructive events were adequately treated at this pressure, however patient developed central apneas and hypopneas later on the study     Use of ASV was ineffective. It appeared that the ASV algorithm was inappropriately dropping pressure support and events re-occurred when pressure support was dropped. This may be related to machine malfunction or may possibly related to poor positioning with patient on 4 pillows with fairly extreme neck flexion.    Possible chronic  hypercarbia    Titration complicated by high leak, especially on ASV    Recommendations:    Trial of treatment with Bilevel 10/7 cmh2O     If symptoms, hypoxemia or PAP intolerance persists could consider repeat ASV titration all-night with attention to positioning    Suggest optimizing sleep schedule     Check ABG, VBG      Diagnostic Codes:   Obstructive Sleep Apnea G47.33  Central Sleep Apnea G47.31  [place]yne?North Breathing Pattern R06.3        _____________________________________   Electronically Signed By: Gustavo Wright MD 4/8/21         Range(%) Time in range (min)   0.0 - 89.0 1.8   0.0 - 88.0 0.4         Stage Min(mm Hg) Max(mm Hg)   Wake 44.9 57.1   NREM(1+2+3) 47.5 57.0   REM 49.8 56.5         Range(mmHg) Time in range (min)   55.0 - 100.0 113.7   Excluded data <20.0 & >65.0 -

## 2021-04-11 NOTE — PROGRESS NOTES
Assessment & Plan     Dysphagia, unspecified type    Pt is appropriate for a dysphagia diet level 2 and thin liquids.  Foods should be soft and moistened with additional sauces, gravies, or syrup.  No straws.  Pt should be upright for intake and will need to take small bites/sips, pace self, and alternate consistenices. Pills should be served whole in pureed textures.     Chronic obstructive pulmonary disease, unspecified COPD type (H)  Refilled as requested  - albuterol (PROAIR HFA/PROVENTIL HFA/VENTOLIN HFA) 108 (90 Base) MCG/ACT inhaler; Inhale 2 puffs into the lungs every 6 hours    Coronary artery disease involving native coronary artery of native heart without angina pectoris  Stable as noted, refill requested to carry.  - nitroGLYcerin (NITROSTAT) 0.4 MG sublingual tablet; For chest pain place 1 tablet under the tongue every 5 minutes for 3 doses. If symptoms persist 5 minutes after 1st dose call 911.    (H47.987) Pain in joint, upper arm, right  Comment: Will image for reassurance  Plan: XR Elbow Right 2 Views, XR Shoulder Right G/E 3        Views             See Patient Instructions    Return in about 3 months (around 7/12/2021) for if symptoms recur or worsen, Routine Visit.    Allan Casey MD  [facility]    Chito Hassan is a 71 year old who presents for the following health issues     HPI     Musculoskeletal problem/pain  Onset/Duration: months   Description  Location: shoulder - right  Joint Swelling: no  Redness: no  Pain: YES  Warmth: no  Intensity:  Moderate- intermittent   Progression of Symptoms:  intermittent  Accompanying signs and symptoms:   Fevers: no  Numbness/tingling/weakness: no  History  Trauma to the area: no  Recent illness:  no  Previous similar problem: YES- seen by sports medicine 2/14 for bursitis  Previous evaluation:  YES  Precipitating or alleviating factors:  Aggravating factors include: cold or damp weather  Therapies tried and  outcome: steroid injection, pain patch       Patient requesting orders to have food pureed for her assisted living     Treatment Diagnosis Mild oropharyngeal dysphagia    Diet texture recommendations Dysphagia diet level 2;Thin liquids  (Pills served whole in purees)   Recommended Feeding/Eating Techniques alternate between small bites and sips of food/liquid;maintain upright posture during/after eating for 30 mins;small sips/bites   Rehab Potential good, to achieve stated therapy goals   Therapy Frequency other (see comments)  (TBD based on video swallow study )   Predicted Duration of Therapy Intervention (days/wks) TBD    Anticipated Discharge Disposition home w/ assist   Risks and Benefits of Treatment have been explained. Yes   Patient, family and/or staff in agreement with Plan of Care Yes   Clinical Impression Comments Pt seen for a clinical swallow evaluation.  Pt presents with mild oropharyngeal dysphagia.  Pt tolerated trials of thin liquids, purees, and solid textures with no overt s/s of aspiration.  Pt noted to take small amounts and elicit a deliberate swallow upon items of increased viscosity.  Pt used a liquid wash to clear globus sensation following solid textures.  Pt is appropriate for a dysphagia diet level 2 and thin liquids.  Foods should be soft and moistened with additional sauces, gravies, or syrup.  No straws.  Pt should be upright for intake and will need to take small bites/sips, pace self, and alternate consistenices.  Pills should be served whole in pureed textures.  It is also recommended that pt complete a video swallow study to fully assess pharyngeal phase of swallow in the setting of c/o worsening dysphagia, globus, and concern for swallow safety.  Pt is in agreement with this recommendation.     Swallow Goals   SLP Swallow Goals 1;2   Swallow Goal 1   Goal Identifier 1   Goal Description Pt will demonstrate understanding and implementation of aspiration precautions and safe  swallow strategies to maximize ease and safety of swallow as determined by the treating clinician.     Target Date 06/24/21   Swallow Goal 2   Goal Identifier 2   Goal Description Pt will complete a video swallow study by 6/24/2021.   Target Date 06/24/21   Total Session Time   SLP Eval: oral/pharyngeal swallow function, clinical minutes (85561) 20   Total Evaluation Time 20   Therapy Certification   Certification date from 03/26/21   Certification date to 06/24/21   Medical Diagnosis Dysphagia    Certification I certify the need for these services furnished under this plan of treatment and while under my care.  (Physician co-signature of this document indicates review and certification of the therapy plan).      Thank you for the referral of Sonya Foote.  If you have any questions about this report, please contact me using the information below.          Halley Meraz  MS CCC-SLP                 Cosigned by: Allan Casey MD at 3/29/2021  9:26 AM        Other concerns:  1. Patient requesting order that allows her to carry her nitroglycerin tablets     Review of Systems   CONSTITUTIONAL: NEGATIVE for fever, chills, change in weight  EYES: NEGATIVE for vision changes or irritation  ENT/MOUTH: NEGATIVE for ear issues  RESP: NEGATIVE for significant cough or SOB  CV: NEGATIVE for chest pain, palpitations or peripheral edema  : NEGATIVE for frequency, dysuria, or hematuria  HEME: NEGATIVE for bleeding problems  PSYCHIATRIC: NEGATIVE for changes in mood or affect      Objective    /60   Pulse 61   Temp 97.9  F (36.6  C) (Temporal)   Resp 16   Wt 56.7 kg (125 lb)   SpO2 97%   BMI 22.15 kg/m    Body mass index is 22.15 kg/m .  Physical Exam   GENERAL: alert and no distress  EYES: Eyes grossly normal to inspection, PERRL and conjunctivae and sclerae normal  HENT: ear canals and TM's normal, nose and mouth without ulcers or lesions  NECK: no adenopathy, no asymmetry, masses, or scars and thyroid  normal to palpation  RESP: lungs clear to auscultation - no rales, rhonchi or wheezes  CV: regular rate and rhythm, normal S1 S2, no S3 or S4  MS: Patient is wheelchair-bound and an electric mobilized wheelchair.  She has bilateral lower extremity amputations.  Mild pain right elbow and shoulder with movement.  PSYCH: mentation appears normal, affect normal/bright still mourning the death of her significant other 3 years ago which was discussed    LDL Cholesterol Calculated   Date Value Ref Range Status   12/16/2019 95 <100 mg/dL Final     Comment:     Desirable:       <100 mg/dl     Lab Results   Component Value Date    ALT 78 12/26/2020     Lab Results   Component Value Date    A1C 5.1 12/26/2020    A1C 5.0 08/19/2020    A1C 5.0 12/16/2019    A1C 5.0 04/17/2019    A1C 5.1 06/06/2018     Creatinine   Date Value Ref Range Status   12/26/2020 0.57 0.52 - 1.04 mg/dL Final     Hemoglobin   Date Value Ref Range Status   12/26/2020 12.0 11.7 - 15.7 g/dL Final   ]

## 2021-04-12 ENCOUNTER — ANCILLARY PROCEDURE (OUTPATIENT)
Dept: GENERAL RADIOLOGY | Facility: CLINIC | Age: 71
End: 2021-04-12
Attending: INTERNAL MEDICINE
Payer: COMMERCIAL

## 2021-04-12 ENCOUNTER — OFFICE VISIT (OUTPATIENT)
Dept: INTERNAL MEDICINE | Facility: CLINIC | Age: 71
End: 2021-04-12
Payer: COMMERCIAL

## 2021-04-12 VITALS
SYSTOLIC BLOOD PRESSURE: 106 MMHG | RESPIRATION RATE: 16 BRPM | TEMPERATURE: 97.9 F | OXYGEN SATURATION: 97 % | WEIGHT: 125 LBS | DIASTOLIC BLOOD PRESSURE: 60 MMHG | BODY MASS INDEX: 22.15 KG/M2 | HEART RATE: 61 BPM

## 2021-04-12 DIAGNOSIS — M25.521 PAIN IN JOINT, UPPER ARM, RIGHT: ICD-10-CM

## 2021-04-12 DIAGNOSIS — I25.10 CORONARY ARTERY DISEASE INVOLVING NATIVE CORONARY ARTERY OF NATIVE HEART WITHOUT ANGINA PECTORIS: ICD-10-CM

## 2021-04-12 DIAGNOSIS — J44.9 CHRONIC OBSTRUCTIVE PULMONARY DISEASE, UNSPECIFIED COPD TYPE (H): ICD-10-CM

## 2021-04-12 DIAGNOSIS — R13.10 DYSPHAGIA, UNSPECIFIED TYPE: Primary | ICD-10-CM

## 2021-04-12 LAB
CHOLEST SERPL-MCNC: 147 MG/DL
HDLC SERPL-MCNC: 48 MG/DL
LDLC SERPL CALC-MCNC: 78 MG/DL
NONHDLC SERPL-MCNC: 99 MG/DL
TRIGL SERPL-MCNC: 103 MG/DL

## 2021-04-12 PROCEDURE — 73070 X-RAY EXAM OF ELBOW: CPT | Mod: RT | Performed by: RADIOLOGY

## 2021-04-12 PROCEDURE — 99214 OFFICE O/P EST MOD 30 MIN: CPT | Performed by: INTERNAL MEDICINE

## 2021-04-12 PROCEDURE — 36415 COLL VENOUS BLD VENIPUNCTURE: CPT | Performed by: INTERNAL MEDICINE

## 2021-04-12 PROCEDURE — 80061 LIPID PANEL: CPT | Performed by: INTERNAL MEDICINE

## 2021-04-12 PROCEDURE — 73030 X-RAY EXAM OF SHOULDER: CPT | Mod: RT | Performed by: RADIOLOGY

## 2021-04-12 RX ORDER — NITROGLYCERIN 0.4 MG/1
TABLET SUBLINGUAL
Qty: 25 TABLET | Refills: 3 | Status: SHIPPED | OUTPATIENT
Start: 2021-04-12

## 2021-04-12 RX ORDER — ALBUTEROL SULFATE 90 UG/1
2 AEROSOL, METERED RESPIRATORY (INHALATION) EVERY 6 HOURS
Qty: 8.5 G | Refills: 5 | Status: SHIPPED | OUTPATIENT
Start: 2021-04-12

## 2021-04-12 NOTE — LETTER
[facility]  [address] [address]  [phone]      4/12/2021       Sonya Foote  [address] [place] [address]        Dear Sonya,    Your cholesterol looks good and I would not change anything at this point but would repeat your labs in 12 months.    Sincerely,      Allan Casey MD  Internal Medicine

## 2021-04-13 DIAGNOSIS — Z53.9 DIAGNOSIS NOT YET DEFINED: Primary | ICD-10-CM

## 2021-04-13 PROCEDURE — G0179 MD RECERTIFICATION HHA PT: HCPCS | Performed by: INTERNAL MEDICINE

## 2021-04-13 NOTE — PROGRESS NOTES
Pulmonary Clinic Follow-Up Visit Note    Assessment and Plan:  69 yo W w/ Hx of CAD s/p MI, CHF (EF 60-65%), PAD s/p bilateral AKAs, CVA x3, JOSE (off CPAP, awaiting repeat study study), GERD with consequent esophageal stricture, hiatal hernia, epilepsy, sickle cell trait, androgen insensitivy syndrome, chronic pain with fentanyl pump, blindness - presents for follow up of chronic bronchitis, chronic dyspnea and chronic cough.     # Chronic cough, bronchitis - related to her ongoing smoking & GERD. She has esophageal stricture and recent imaging showed inflammation throughout the esophagus suggesting uncontrolled GERD. On a PPI BID now and reflux improved. We continue to discuss smoking cessation.     # Dyspnea / Wheezing  # Asthma   Due to asthma vs deconditioning vs vocal cord dysfunction. Ongoing dyspnea with reports of wheezing. PFTs normal. DLCO normal. Review of prior CTA does not show significant emphysema or other parenchymal disease. No anemia. Recent Echo's showed recovered EF from a prior cardiomyopathy and RV function was normal.   - speech consult for VCD eval  - increased Advair to from low to high dose  - pulm rehab    # Mild JOSE Hx, Central sleep apnea Hx - seeing sleep, performed sleep study, trying BiPAP    RTC 3 months.    Seen and staffed with Dr. Feliz.  Rl Barroso MD  [facility] Fellow, Pgr [phone]      Attending note:  Patient seen, examined and discussed with Dr. Barroso. All data reviewed.  Agree with the assessment and plan as outlined in the above note. Continue SOB- COPD versus VCD versus combination. Will increase Advair and evaluate for VCD with SLP referral.    Terri Feliz MD  [phone]    ---------------------------------------------------    CC: Chronic Bronchitis Follow Up    HPI: 69 yo W w/ Hx of CAD s/p MI, CHF (EF 60-65%), PAD s/p bilateral AKAs, CVA x3, JOSE (off CPAP, awaiting repeat study study), GERD with consequent esophageal stricture, hiatal hernia, epilepsy, sickle cell  trait, androgen insensitivy syndrome, chronic pain with fentanyl pump, blindness - presents for follow up of chronic bronchitis and chronic cough.     Today complains of more of shortness of breath while doing activities around the house.  Does have wheezing with this shortness of breath.  She obtained new prosthetics and is trying to walk but the breathing is somewhat hindering her.  Her cough is unchanged, we discussed that this will likely persist while she smokes.  Obtained a recent sleep study, results as above.  Review of systems including 12 point questions entirely otherwise negative. GERD is improved. No pain in her tongue or new pain in the back of her throat.     PMH:  Past Medical History:   Diagnosis Date     Acid reflux disease      Benign essential hypertension      Blind left eye     due to central retinal artery occlusion     CAD (coronary artery disease)     UA and inferior MI in 2016 s/p PCI     Chest pain on breathing 06/10/2020     Chronic back pain      Chronic neck pain      Chronic pain syndrome     with fentanyl intrathecal pain pump     Complex sleep apnea syndrome      COPD (chronic obstructive pulmonary disease) (H)      Dysphagia     chronic due to esophageal strictures and multiple previous dilitations      ROGER (generalized anxiety disorder)      History of blood transfusion     x5; no adverse reactions     History of central retinal artery occlusion 2006    left-sided     History of stroke     residual left-sided weakness     Hx of carotid artery stenosis     s/p left carotid stent in 2006 and balloon angioplasty in 2016     MDD (major depressive disorder)      Neurogenic bladder     SPT in place     JOSE (obstructive sleep apnea)      Osteoporosis      PAD (peripheral artery disease) (H)      Peripheral neuropathy      Person who has had sex change operation     male to female     Seizure disorder (H)     many years since last grand mal; daily, brief petit mals     Sickle cell trait  (H)      Type 2 diabetes mellitus (H)      PSH:  Past Surgical History:   Procedure Laterality Date     AMPUTATE LEG ABOVE KNEE Left 6/11/2016    Procedure: AMPUTATE LEG ABOVE KNEE;  Surgeon: Mello Rodriguez MD;  [facility]     AMPUTATE LEG BELOW KNEE Right 11/7/2016    Procedure: AMPUTATE LEG BELOW KNEE;  Surgeon: Savannah Durant MD;  [facility]     AMPUTATE REVISION STUMP LOWER EXTREMITY Right 11/11/2016    Procedure: AMPUTATE REVISION STUMP LOWER EXTREMITY;  Surgeon: Savannah Durant MD;  [facility]     AMPUTATE REVISION STUMP LOWER EXTREMITY Right 11/16/2016    Procedure: AMPUTATE REVISION STUMP LOWER EXTREMITY;  Surgeon: Savannah Durant MD;  [facility]     AMPUTATE TOE(S) Right 1/5/2016    Procedure: AMPUTATE TOE(S);  Surgeon: Mello Gaines DPM;  Location: [facility]     ANGIOGRAM Bilateral 11/21/2014    Procedure: ANGIOGRAM;  Surgeon: Savannah Durant MD;  [facility]     ANGIOGRAM Left 1/16/2015    Procedure: ANGIOGRAM;  Surgeon: Savannah Durant MD;  [facility]     ANGIOGRAM Bilateral 9/14/2015    Procedure: ANGIOGRAM;  Surgeon: Savannah Durant MD;  [facility]     ANGIOGRAM Left 10/12/2015    Procedure: ANGIOGRAM;  Surgeon: Savannah Durant MD;  [facility]     ANGIOGRAM Right 6/6/2016    Procedure: ANGIOGRAM;  Surgeon: Savannah Durant MD;  [facility]     ANGIOPLASTY Right 6/6/2016    Procedure: ANGIOPLASTY;  Surgeon: Savannah Durant MD;  [facility]     APPENDECTOMY       BREAST BIOPSY, RT/LT Right     benign     CATARACT IOL, RT/LT Right      CHOLECYSTECTOMY       COLONOSCOPY N/A 8/25/2014    Procedure: COLONOSCOPY;  Surgeon: Mello Ferrer MD;  [facility]     COLONOSCOPY WITH CO2 INSUFFLATION N/A 8/20/2014    Procedure: COLONOSCOPY WITH CO2 INSUFFLATION;  Surgeon: Duane, William Charles, MD;  [facility]     CYSTOSCOPY, INJECT BOTOX N/A 5/21/2020    Procedure: CYSTOSCOPY, WITH BOTULINUM TOXIN INJECTION;  Surgeon: Loki Gordon MD;  [facility]      CYSTOSCOPY, INJECT BOTOX N/A 10/8/2020    Procedure: CYSTOSCOPY, INJECT BOTOX INTO BLADDER, SUPRAPUBIC TUBE EXCHNAGE;  Surgeon: Loki Gordon MD;  [facility]     CYSTOSCOPY, INJECT BOTOX N/A 1/7/2021    Procedure: CYSTOSCOPY, WITH BOTULINUM TOXIN INJECTION;  Surgeon: Loki Gordon MD;  [facility]     CYSTOSCOPY, INTRAVESICAL INJECTION N/A 10/31/2019    Procedure: CYSTOSCOPY, BOTOX INJECTION, Suprapubic Catheter Exchange;  Surgeon: Loki Gordon MD;  [facility]     CYSTOSTOMY, INSERT TUBE SUPRAPUBIC, COMBINED N/A 1/16/2018    Procedure: COMBINED CYSTOSTOMY, INSERT TUBE SUPRAPUBIC;  Cystoscopy, Intraoperative Ultrasound, Suprapubic Tube Placement;  Surgeon: Keanu Dawson MD;  [facility]     ENDARTERECTOMY FEMORAL  5/23/2014    Procedure: ENDARTERECTOMY FEMORAL;  Surgeon: Jason Joshi MD;  [facility]     ESOPHAGOSCOPY, GASTROSCOPY, DUODENOSCOPY (EGD), COMBINED  12/14/2012    Procedure: COMBINED ESOPHAGOSCOPY, GASTROSCOPY, DUODENOSCOPY (EGD), BIOPSY SINGLE OR MULTIPLE;  ESOPHAGOSCOPY, GASTROSCOPY, DUODENOSCOPY (EGD), DILATATION ;  Surgeon: Elizabeth Stevenson MD;  Location:  [facility]     ESOPHAGOSCOPY, GASTROSCOPY, DUODENOSCOPY (EGD), COMBINED  12/31/2013    Procedure: COMBINED ESOPHAGOSCOPY, GASTROSCOPY, DUODENOSCOPY (EGD), BIOPSY SINGLE OR MULTIPLE;;  Surgeon: Clemente Lopez MD;  Location:  GI     ESOPHAGOSCOPY, GASTROSCOPY, DUODENOSCOPY (EGD), COMBINED  4/1/2014    Procedure: COMBINED ESOPHAGOSCOPY, GASTROSCOPY, DUODENOSCOPY (EGD);;  Surgeon: Clemente Lopez MD;  Location:  [facility]     ESOPHAGOSCOPY, GASTROSCOPY, DUODENOSCOPY (EGD), COMBINED  6/28/2014    Procedure: COMBINED ESOPHAGOSCOPY, GASTROSCOPY, DUODENOSCOPY (EGD);  Surgeon: Clemente Lopez MD;  Location:  [facility]     ESOPHAGOSCOPY, GASTROSCOPY, DUODENOSCOPY (EGD), COMBINED N/A 8/20/2014    Procedure: COMBINED ESOPHAGOSCOPY, GASTROSCOPY, DUODENOSCOPY (EGD), BIOPSY SINGLE OR MULTIPLE;  Surgeon:  Duane, William Charles, MD;  [facility]     ESOPHAGOSCOPY, GASTROSCOPY, DUODENOSCOPY (EGD), COMBINED N/A 8/22/2014    Procedure: COMBINED ESOPHAGOSCOPY, GASTROSCOPY, DUODENOSCOPY (EGD), BIOPSY SINGLE OR MULTIPLE;  Surgeon: Mello Ferrer MD;  [facility]     ESOPHAGOSCOPY, GASTROSCOPY, DUODENOSCOPY (EGD), COMBINED N/A 10/2/2014    Procedure: COMBINED ESOPHAGOSCOPY, GASTROSCOPY, DUODENOSCOPY (EGD), BIOPSY SINGLE OR MULTIPLE;  Surgeon: Remy Haskins MD;  [facility]     ESOPHAGOSCOPY, GASTROSCOPY, DUODENOSCOPY (EGD), COMBINED Left 12/15/2014    Procedure: COMBINED ESOPHAGOSCOPY, GASTROSCOPY, DUODENOSCOPY (EGD), BIOPSY SINGLE OR MULTIPLE;  Surgeon: Remy Haskins MD;  [facility]     ESOPHAGOSCOPY, GASTROSCOPY, DUODENOSCOPY (EGD), COMBINED N/A 2/25/2015    Procedure: COMBINED ENDOSCOPIC ULTRASOUND, ESOPHAGOSCOPY, GASTROSCOPY, DUODENOSCOPY (EGD), FINE NEEDLE ASPIRATE/BIOPSY;  Surgeon: Clemente Lugo MD;  [facility]     ESOPHAGOSCOPY, GASTROSCOPY, DUODENOSCOPY (EGD), COMBINED Left 2/25/2015    Procedure: COMBINED ESOPHAGOSCOPY, GASTROSCOPY, DUODENOSCOPY (EGD), BIOPSY SINGLE OR MULTIPLE;  Surgeon: Clemente Lugo MD;  [facility]     ESOPHAGOSCOPY, GASTROSCOPY, DUODENOSCOPY (EGD), COMBINED N/A 9/25/2016    Procedure: COMBINED ESOPHAGOSCOPY, GASTROSCOPY, DUODENOSCOPY (EGD);  Surgeon: Aziza Patiño MD;  [facility]     ESOPHAGOSCOPY, GASTROSCOPY, DUODENOSCOPY (EGD), COMBINED N/A 1/18/2017    Procedure: COMBINED ESOPHAGOSCOPY, GASTROSCOPY, DUODENOSCOPY (EGD), BIOPSY SINGLE OR MULTIPLE;  Surgeon: Clemente Lopez MD;  [facility]     ESOPHAGOSCOPY, GASTROSCOPY, DUODENOSCOPY (EGD), COMBINED N/A 11/26/2017    Procedure: COMBINED ESOPHAGOSCOPY, GASTROSCOPY, DUODENOSCOPY (EGD), REMOVE FOREIGN BODY;  Esophagogastroduodenoscopy with foreign body extraction  ;  Surgeon: Herberth Castrejon MD;  [facility]     ESOPHAGOSCOPY, GASTROSCOPY, DUODENOSCOPY (EGD),  COMBINED N/A 11/26/2017    Procedure: COMBINED ESOPHAGOSCOPY, GASTROSCOPY, DUODENOSCOPY (EGD), REMOVE FOREIGN BODY;;  Surgeon: Herberth Castrejon MD;  [facility]     ESOPHAGOSCOPY, GASTROSCOPY, DUODENOSCOPY (EGD), COMBINED N/A 4/10/2020    Procedure: ESOPHAGOGASTRODUODENOSCOPY (EGD);  Surgeon: Yael Cabral MD;  [facility]     FASCIOTOMY LOWER EXTREMITY Left 6/10/2016    Procedure: FASCIOTOMY LOWER EXTREMITY;  Surgeon: Mello Rodriguez MD;  [facility]     HC CAPSULE ENDOSCOPY N/A 8/25/2014    Procedure: CAPSULE/PILL CAM ENDOSCOPY;  Surgeon: Remy Haskins MD;  [facility]     HC CAPSULE ENDOSCOPY N/A 10/2/2014    Procedure: CAPSULE/PILL CAM ENDOSCOPY;  Surgeon: Remy Haskins MD;  [facility]     INJECT BLOCK MEDIAL BRANCH CERVICAL/THORACIC/LUMBAR Left 10/30/2020    Procedure: Cervical 3, 4, and 5 Medial Branch Block;  Surgeon: Evelyn Lima MD;  Location: [facility]     ORTHOPEDIC SURGERY      broken wrist repair     REVISE CATHETER INTRATHECAL  08/24/2020     SEX TRANSFORMATION SURGERY, MALE TO FEMALE  1974    1974     SINUS SURGERY      cyst removed     TONSILLECTOMY       VASCULAR SURGERY  2006    Left carotid stent     Allergies:  Allergies   Allergen Reactions     Bee Venom Anaphylaxis     Penicillins Anaphylaxis     Dilantin [Phenytoin] Other (See Comments)     Generic dilantin only per pt     Iodine Hives     Novocaine [Procaine] Hives     Tositumomab Unknown     Social History:  Social History     Socioeconomic History     Marital status:      Spouse name: Not on file     Number of children: 2     Years of education: Not on file     Highest education level: Not on file   Occupational History     Occupation: Retired   Social Needs     Financial resource strain: Not on file     Food insecurity     Worry: Not on file     Inability: Not on file     Transportation needs     Medical: Not on file     Non-medical: Not on file   Tobacco Use     Smoking status:  Current Every Day Smoker     Packs/day: 1.00     Years: 30.00     Pack years: 30.00     Types: Cigarettes, Cigars     Smokeless tobacco: Never Used     Tobacco comment: 1.5 packs per day    Substance and Sexual Activity     Alcohol use: No     Alcohol/week: 0.0 standard drinks     Drug use: No     Sexual activity: Not Currently   Lifestyle     Physical activity     Days per week: Not on file     Minutes per session: Not on file     Stress: Not on file   Relationships     Social connections     Talks on phone: Not on file     Gets together: Not on file     Attends [employer] service: Not on file     Active member of club or organization: Not on file     Attends meetings of clubs or organizations: Not on file     Relationship status: Not on file     Intimate partner violence     Fear of current or ex partner: Not on file     Emotionally abused: Not on file     Physically abused: Not on file     Forced sexual activity: Not on file   Other Topics Concern     Parent/sibling w/ CABG, MI or angioplasty before 65F 55M? Not Asked      Service Not Asked     Blood Transfusions Not Asked     Caffeine Concern No     Comment: 1 in the morning     Occupational Exposure Not Asked     Hobby Hazards Not Asked     Sleep Concern Not Asked     Stress Concern Not Asked     Weight Concern Not Asked     Special Diet Not Asked     Back Care Not Asked     Exercise Not Asked     Bike Helmet Not Asked     Seat Belt Not Asked     Self-Exams Not Asked   Social History Narrative    Living in a nursing home (as of 2020) for prosthetic fitting and therapy; usually resides in an [facility].    Two adopted children (Kam and Prashanth) and 3 grandchildren (as of 2020).     Medications:  Current Outpatient Medications   Medication Sig Dispense Refill     ACETAMINOPHEN EXTRA STRENGTH 500 MG tablet TAKE 1 TABLET BY MOUTH EVERY 6 HOURS AS NEEDED FOR PAIN / FEVER 90 tablet 5     ADVAIR DISKUS 250-50 MCG/DOSE inhaler INHALE 1 PUFF BY MOUTH INTO THE LUNGS  TWICE DAILY 60 each 1     albuterol (PROAIR HFA/PROVENTIL HFA/VENTOLIN HFA) 108 (90 Base) MCG/ACT inhaler Inhale 2 puffs into the lungs every 6 hours 8.5 g 5     albuterol (PROVENTIL) (2.5 MG/3ML) 0.083% neb solution Take 1 vial (2.5 mg) by nebulization every 6 hours as needed for shortness of breath / dyspnea or wheezing 180 mL 3     alendronate (FOSAMAX) 70 MG tablet TAKE ONE TABLET BY MOUTH EVERY 7 DAYS (TAKE WITH 8OZ OF WATER 30 MINUTES BEFORE BREAKFAST AND REMAIN UPRIGHT DURING THIS TIME) 4 tablet 97     ASPERCREME LIDOCAINE 4 % Patch APPLY 1 PATCH TOPICALLY TO LOWER BACK ONCE DAILY (ON FOR 12 HOURS, OFF FOR 12 HOURS) 30 patch 10     ASPIRIN LOW DOSE 81 MG chewable tablet CHEW AND SWALLOW ONE TABLET BY MOUTH IN THE MORNING 28 tablet 10     atorvastatin (LIPITOR) 40 MG tablet TAKE 1 TABLET BY MOUTH AT BEDTIME 28 tablet 10     blood glucose monitoring (ONE TOUCH ULTRA 2) meter device kit Use to test blood sugars 3 times daily or as directed. 1 kit 0     blood glucose monitoring (ONE TOUCH ULTRA) test strip Use to test blood sugars 3 times daily or as directed. 3 Box 3     blood glucose monitoring (ONE TOUCH ULTRASOFT) lancets Use to test blood sugar 3 times daily or as directed. 100 each PRN     brimonidine (ALPHAGAN) 0.2 % ophthalmic solution Place 1 drop into the right eye 2 times daily ( 12 hours apart).    Please keep 11-16-20 appt for refills. 5 mL 4     capsaicin-menthol-methyl sal 0.025-1-12 % external cream Apply 1 Application topically daily as needed (topical pain) 56.6 g 1     diclofenac (VOLTAREN) 1 % topical gel APPLY 2 GRAMS TOPICALLY TO AFFECTED AREA(S) THREE TIMES A  g 11     dorzolamide (TRUSOPT) 2 % ophthalmic solution Place 1 drop into the right eye 2 times daily 1 Bottle 1     EPINEPHrine (ANY BX GENERIC EQUIV) 0.3 MG/0.3ML injection 2-pack INJECT 0.3 ML INTO THE MUSCLE AS NEEDED FOR ANAPHYLAXIS 2 each 1     escitalopram (LEXAPRO) 10 MG tablet TAKE 1 TABLET BY MOUTH ONCE DAILY 28 tablet  10     FEROSUL 325 (65 Fe) MG tablet TAKE 1 TABLET BY MOUTH TWICE DAILY WITH MEALS 56 tablet 10     fluticasone (FLONASE) 50 MCG/ACT nasal spray SPRAY 2 SPRAYS IN EACH NOSTRIL DAILY 16 g 11     gabapentin (NEURONTIN) 100 MG capsule Take 1 capsule (100 mg) by mouth 3 times daily 60 capsule 0     INCRUSE ELLIPTA 62.5 MCG/INH Inhaler INHALE 1 PUFF BY MOUTH ONCE DAILY 30 each 10     lactulose (CHRONULAC) 10 GM/15ML solution TAKE 30ML (20MG) BY MOUTH AS NEEDED FOR CONSTIPATION 473 mL 1     latanoprost (XALATAN) 0.005 % ophthalmic solution INSTILL ONE DROP IN EACH EYE AT BEDTIME 2.5 mL 10     levETIRAcetam (KEPPRA) 500 MG tablet TAKE 1 TABLET BY MOUTH TWICE DAILY 56 tablet 11     lisinopril (ZESTRIL) 20 MG tablet TAKE 1 TABLET BY MOUTH EVERY MORNING 28 tablet 11     loratadine (CLARITIN) 10 MG tablet TAKE 1 TABLET BY MOUTH ONCE DAILY 28 tablet 11     Medication given by intrathecal pump continuous prn Drug # 1: Fentanyl (Sublimaze).    Conc:2000  mcg/mL  Total Dose / 24 hours: 600  mcg  Drug # 2: Bupivacaine (Marcaine) .    Conc:20  mg/mL  Total Dose / 24 hours: 6  mg  Drug # 3: Morphine (Duramorph or Infumorph) .    Conc:3.7  mg/mL  Total Dose / 24 hours: 1.11  mg   Diluent: NS       Menthol, Topical Analgesic, 5 % GEL Externally apply topically daily as needed 113 g 0     metFORMIN (GLUCOPHAGE) 500 MG tablet TAKE 1 TABLET BY MOUTH EVERY EVENING WITH DINNER 28 tablet 11     metoprolol succinate ER (TOPROL-XL) 50 MG 24 hr tablet TAKE 1 TABLET BY MOUTH EVERY MORNING 30 tablet 10     Multiple Vitamins-Minerals (TAB-A-MACY) TABS TAKE 1 TABLET BY MOUTH ONCE DAILY 28 tablet 11     multivitamin, therapeutic (THERA-VIT) TABS tablet Take 1 tablet by mouth daily       mupirocin (BACTROBAN) 2 % external ointment Apply 1 each topically daily nares       nicotine (NICODERM CQ) 14 MG/24HR 24 hr patch APPLY 1 PATCH TOPICALLY DAILY ( EVERY 24 HOURS ) 28 patch 3     nitroGLYcerin (NITROSTAT) 0.4 MG sublingual tablet For chest pain place  1 tablet under the tongue every 5 minutes for 3 doses. If symptoms persist 5 minutes after 1st dose call 911. 25 tablet 3     Nutritional Supplements (ENSURE) LIQD DRINK ONE CAN / BOTTLE BY MOUTH TWICE DAILY WITH MEALS 25428 mL 11     oxyCODONE (OXY-IR) 5 MG capsule Take 1-2 capsules by mouth every 4 hours as needed for severe pain       pantoprazole (PROTONIX) 40 MG EC tablet TAKE 1 TABLET BY MOUTH EVERY MORNING 28 tablet 11     phenytoin (DILANTIN) 100 MG capsule TAKE 2 CAPS (200MG) BY MOUTH TWICE A  capsule 11     polyethylene glycol (MIRALAX) 17 GM/SCOOP powder MIX 17GM OF POWDER IN 8OZ OF WATER UNTIL COMPLETELY DISSOLVED. DRINK SOLUTION BY MOUTH IN THE MORNING 510 g 8     pregabalin (LYRICA) 150 MG capsule TAKE 1 CAPSULE BY MOUTH THREE TIMES DAILY 90 capsule 4     risperiDONE (RISPERDAL) 0.5 MG tablet TAKE 1 TABLET BY MOUTH AT BEDTIME 28 tablet 11     SENEXON-S 8.6-50 MG tablet TAKE 1 TABLET BY MOUTH TWICE DAILY 56 tablet 10     solifenacin (VESICARE) 10 MG tablet TAKE 1 TABLET BY MOUTH EVERY MORNING 28 tablet 10     traZODone (DESYREL) 150 MG tablet TAKE 1 TABLET BY MOUTH AT BEDTIME 28 tablet 10     vitamin D3 (CHOLECALCIFEROL) 10 MCG (400 UNIT) capsule Take 2 capsules by mouth daily       Vitamin D3 (VITAMIN D) 10 MCG (400 UNIT) tablet TAKE TWO TABLETS (800 UNITS) BY MOUTH ONCE DAILY 56 tablet 11     Family History:  Family History   Problem Relation Age of Onset     Dementia Mother      Diabetes Mother         type unknown     Coronary Artery Disease Mother         MI     Glaucoma Father      Diabetes Father         type unknown     Heart Failure Father      Schizophrenia Brother      Depression Brother      Suicide Sister      Depression Sister      Diabetes Sister      Ovarian Cancer Maternal Aunt      Leukemia Maternal Aunt      Diabetes Maternal Grandmother      Diabetes Maternal Grandfather      Diabetes Paternal Grandmother      Diabetes Paternal Grandfather      Breast Cancer Sister       Cerebrovascular Disease Brother      Colon Cancer No family hx of      Macular Degeneration No family hx of        Physical Exam:  There were no vitals taken for this visit.    General: Sitting in the chair in NAD  HEENT: anicteric, moist mucosa, no thrush  Neck: no palpable lymphadenopathy, no JVD noted  Chest: CTAB, no wheezing  Cardiac: RRR no murmurs  Abdomen: Soft, flat, non tender, active BS  Extremities: No LE Edema  Neuro: A&Ox3, no focal defecits  Skin: no rash noted    Labs and Radiology - prior reviewed, none new.    6/10/20 CTA  FINDINGS: Diffuse esophageal wall thickening, especially proximally  and distally. Small hiatal hernia. If there is history of dysphagia  comment please correlate with esophagram and/or upper GI endoscopy as  clinically appropriate. Left atrial cardiac chamber is enlarged. Upper  abdomen was grossly unremarkable. Thyroid appears grossly  unremarkable. Subcentimeter right hilar lymph node appears unchanged.  No enlarged axillary or mediastinal lymph nodes. Atherosclerotic  calcification in aorta and coronary arteries. No dominant pulmonary  nodule or groundglass opacity. Areas of scarring and subsegmental  atelectasis are present in both lung bases, slightly increased from  previous. Bones show degenerative disc changes throughout the thoracic  spine. Excessive kyphosis in the thorax.  The pulmonary artery tree was well-opacified with contrast comment no  evidence of film defect indicate embolus.  IMPRESSION: No CT evidence for pulmonary embolus. Esophageal  thickening comment please consider nonemergent esophagram or upper GI  endoscopy as clinically appropriate. Small hiatal hernia. Aortic and  coronary artery atherosclerosis. Bibasilar atelectasis and scarring  bilaterally. Excessive thoracic kyphosis.     June 2020 Echo  Interpretation Summary  Global and regional left ventricular function is normal with an EF of 60-65%.  No regional wall motion abnormalities are seen.  Right  ventricular function, chamber size, wall motion, and thickness are  normal.  No hemodynamically significant valvular abnoramalities were noted.  The inferior vena cava was normal in size with preserved respiratory  variability.  No pericardial effusion is present.  This study was compared with the study from 11/13/2019 . There has been no  change.    PFT's:  FVC: 2.94L (102%)  FEV1:2.46L (109%)  FEV1/FVC: 84%  DLCO: 13.67 (63%)    MIP & MEP were hindered by effort and are not reliable.

## 2021-04-14 ENCOUNTER — OFFICE VISIT (OUTPATIENT)
Dept: PULMONOLOGY | Facility: CLINIC | Age: 71
End: 2021-04-14
Attending: STUDENT IN AN ORGANIZED HEALTH CARE EDUCATION/TRAINING PROGRAM
Payer: COMMERCIAL

## 2021-04-14 ENCOUNTER — TELEPHONE (OUTPATIENT)
Dept: INTERNAL MEDICINE | Facility: CLINIC | Age: 71
End: 2021-04-14

## 2021-04-14 VITALS
OXYGEN SATURATION: 96 % | RESPIRATION RATE: 18 BRPM | BODY MASS INDEX: 22.15 KG/M2 | DIASTOLIC BLOOD PRESSURE: 63 MMHG | WEIGHT: 125 LBS | SYSTOLIC BLOOD PRESSURE: 121 MMHG | HEART RATE: 71 BPM

## 2021-04-14 DIAGNOSIS — R30.0 DYSURIA: Primary | ICD-10-CM

## 2021-04-14 DIAGNOSIS — J45.909 UNCOMPLICATED ASTHMA, UNSPECIFIED ASTHMA SEVERITY, UNSPECIFIED WHETHER PERSISTENT: ICD-10-CM

## 2021-04-14 DIAGNOSIS — R39.9 UTI SYMPTOMS: ICD-10-CM

## 2021-04-14 DIAGNOSIS — J44.9 CHRONIC OBSTRUCTIVE PULMONARY DISEASE, UNSPECIFIED COPD TYPE (H): Primary | ICD-10-CM

## 2021-04-14 PROCEDURE — G0463 HOSPITAL OUTPT CLINIC VISIT: HCPCS

## 2021-04-14 PROCEDURE — 99213 OFFICE O/P EST LOW 20 MIN: CPT | Mod: GC | Performed by: STUDENT IN AN ORGANIZED HEALTH CARE EDUCATION/TRAINING PROGRAM

## 2021-04-14 RX ORDER — INHALER, ASSIST DEVICES
SPACER (EA) MISCELLANEOUS
Qty: 1 EACH | Refills: 0 | Status: SHIPPED | OUTPATIENT
Start: 2021-04-14

## 2021-04-14 ASSESSMENT — PAIN SCALES - GENERAL: PAINLEVEL: NO PAIN (0)

## 2021-04-14 NOTE — LETTER
4/14/2021         RE: Sonya Foote  [address] [place] [address]        Dear Colleague,    Thank you for referring your patient, Sonya Foote, to the [facility] FOR LUNG SCIENCE AND [facility]. Please see a copy of my visit note below.    Pulmonary Clinic Follow-Up Visit Note    Assessment and Plan:  69 yo W w/ Hx of CAD s/p MI, CHF (EF 60-65%), PAD s/p bilateral AKAs, CVA x3, JOSE (off CPAP, awaiting repeat study study), GERD with consequent esophageal stricture, hiatal hernia, epilepsy, sickle cell trait, androgen insensitivy syndrome, chronic pain with fentanyl pump, blindness - presents for follow up of chronic bronchitis, chronic dyspnea and chronic cough.     # Chronic cough, bronchitis - related to her ongoing smoking & GERD. She has esophageal stricture and recent imaging showed inflammation throughout the esophagus suggesting uncontrolled GERD. On a PPI BID now and reflux improved. We continue to discuss smoking cessation.     # Dyspnea / Wheezing  # Asthma   Due to asthma vs deconditioning vs vocal cord dysfunction. Ongoing dyspnea with reports of wheezing. PFTs normal. DLCO normal. Review of prior CTA does not show significant emphysema or other parenchymal disease. No anemia. Recent Echo's showed recovered EF from a prior cardiomyopathy and RV function was normal.   - speech consult for VCD eval  - increased Advair to from low to high dose  - pulm rehab    # Mild JOSE Hx, Central sleep apnea Hx - seeing sleep, performed sleep study, trying BiPAP    RTC 3 months.    Seen and staffed with Dr. Feliz.  Rl Barroso MD  [facility] Fellow, Pgr [phone]      Attending note:  Patient seen, examined and discussed with Dr. Barroso. All data reviewed.  Agree with the assessment and plan as outlined in the above note. Continue SOB- COPD versus VCD versus combination. Will increase Advair and evaluate for VCD with SLP referral.    Terri Feliz,  MD  [phone]    ---------------------------------------------------    CC: Chronic Bronchitis Follow Up    HPI: 69 yo W w/ Hx of CAD s/p MI, CHF (EF 60-65%), PAD s/p bilateral AKAs, CVA x3, JOSE (off CPAP, awaiting repeat study study), GERD with consequent esophageal stricture, hiatal hernia, epilepsy, sickle cell trait, androgen insensitivy syndrome, chronic pain with fentanyl pump, blindness - presents for follow up of chronic bronchitis and chronic cough.     Today complains of more of shortness of breath while doing activities around the house.  Does have wheezing with this shortness of breath.  She obtained new prosthetics and is trying to walk but the breathing is somewhat hindering her.  Her cough is unchanged, we discussed that this will likely persist while she smokes.  Obtained a recent sleep study, results as above.  Review of systems including 12 point questions entirely otherwise negative. GERD is improved. No pain in her tongue or new pain in the back of her throat.     PMH:  Past Medical History:   Diagnosis Date     Acid reflux disease      Benign essential hypertension      Blind left eye     due to central retinal artery occlusion     CAD (coronary artery disease)     UA and inferior MI in 2016 s/p PCI     Chest pain on breathing 06/10/2020     Chronic back pain      Chronic neck pain      Chronic pain syndrome     with fentanyl intrathecal pain pump     Complex sleep apnea syndrome      COPD (chronic obstructive pulmonary disease) (H)      Dysphagia     chronic due to esophageal strictures and multiple previous dilitations      ROGER (generalized anxiety disorder)      History of blood transfusion     x5; no adverse reactions     History of central retinal artery occlusion 2006    left-sided     History of stroke     residual left-sided weakness     Hx of carotid artery stenosis     s/p left carotid stent in 2006 and balloon angioplasty in 2016     MDD (major depressive disorder)      Neurogenic  bladder     SPT in place     JOSE (obstructive sleep apnea)      Osteoporosis      PAD (peripheral artery disease) (H)      Peripheral neuropathy      Person who has had sex change operation     male to female     Seizure disorder (H)     many years since last grand mal; daily, brief petit mals     Sickle cell trait (H)      Type 2 diabetes mellitus (H)      PSH:  Past Surgical History:   Procedure Laterality Date     AMPUTATE LEG ABOVE KNEE Left 6/11/2016    Procedure: AMPUTATE LEG ABOVE KNEE;  Surgeon: Mello Rodriguez MD;  [facility]     AMPUTATE LEG BELOW KNEE Right 11/7/2016    Procedure: AMPUTATE LEG BELOW KNEE;  Surgeon: Savannah Durant MD;  [facility]     AMPUTATE REVISION STUMP LOWER EXTREMITY Right 11/11/2016    Procedure: AMPUTATE REVISION STUMP LOWER EXTREMITY;  Surgeon: Savannah Durant MD;  [facility]     AMPUTATE REVISION STUMP LOWER EXTREMITY Right 11/16/2016    Procedure: AMPUTATE REVISION STUMP LOWER EXTREMITY;  Surgeon: Savannah Durant MD;  [facility]     AMPUTATE TOE(S) Right 1/5/2016    Procedure: AMPUTATE TOE(S);  Surgeon: Mello Gaines DPM;  Location:  [facility]     ANGIOGRAM Bilateral 11/21/2014    Procedure: ANGIOGRAM;  Surgeon: Savannah Durant MD;  [facility]     ANGIOGRAM Left 1/16/2015    Procedure: ANGIOGRAM;  Surgeon: Savannah Durant MD;  [facility]     ANGIOGRAM Bilateral 9/14/2015    Procedure: ANGIOGRAM;  Surgeon: Savannah Durant MD;  [facility]     ANGIOGRAM Left 10/12/2015    Procedure: ANGIOGRAM;  Surgeon: Savannah Durant MD;  [facility]     ANGIOGRAM Right 6/6/2016    Procedure: ANGIOGRAM;  Surgeon: Savannah Durant MD;  [facility]     ANGIOPLASTY Right 6/6/2016    Procedure: ANGIOPLASTY;  Surgeon: Savannah Durant MD;  [facility]     APPENDECTOMY       BREAST BIOPSY, RT/LT Right     benign     CATARACT IOL, RT/LT Right      CHOLECYSTECTOMY       COLONOSCOPY N/A 8/25/2014    Procedure: COLONOSCOPY;  Surgeon: Mello Ferrer MD;  [facility]      COLONOSCOPY WITH CO2 INSUFFLATION N/A 8/20/2014    Procedure: COLONOSCOPY WITH CO2 INSUFFLATION;  Surgeon: Duane, William Charles, MD;  Location: [facility]     CYSTOSCOPY, INJECT BOTOX N/A 5/21/2020    Procedure: CYSTOSCOPY, WITH BOTULINUM TOXIN INJECTION;  Surgeon: Loki Gordon MD;  [facility]     CYSTOSCOPY, INJECT BOTOX N/A 10/8/2020    Procedure: CYSTOSCOPY, INJECT BOTOX INTO BLADDER, SUPRAPUBIC TUBE EXCHNAGE;  Surgeon: Loki Gordon MD;  [facility]     CYSTOSCOPY, INJECT BOTOX N/A 1/7/2021    Procedure: CYSTOSCOPY, WITH BOTULINUM TOXIN INJECTION;  Surgeon: Loki Gordon MD;  [facility]     CYSTOSCOPY, INTRAVESICAL INJECTION N/A 10/31/2019    Procedure: CYSTOSCOPY, BOTOX INJECTION, Suprapubic Catheter Exchange;  Surgeon: Loki Gordon MD;  [facility]     CYSTOSTOMY, INSERT TUBE SUPRAPUBIC, COMBINED N/A 1/16/2018    Procedure: COMBINED CYSTOSTOMY, INSERT TUBE SUPRAPUBIC;  Cystoscopy, Intraoperative Ultrasound, Suprapubic Tube Placement;  Surgeon: Keanu Dawson MD;  [facility]     ENDARTERECTOMY FEMORAL  5/23/2014    Procedure: ENDARTERECTOMY FEMORAL;  Surgeon: Jason Joshi MD;  [facility]     ESOPHAGOSCOPY, GASTROSCOPY, DUODENOSCOPY (EGD), COMBINED  12/14/2012    Procedure: COMBINED ESOPHAGOSCOPY, GASTROSCOPY, DUODENOSCOPY (EGD), BIOPSY SINGLE OR MULTIPLE;  ESOPHAGOSCOPY, GASTROSCOPY, DUODENOSCOPY (EGD), DILATATION ;  Surgeon: Elizabeth Stevenson MD;  Location:  GI     ESOPHAGOSCOPY, GASTROSCOPY, DUODENOSCOPY (EGD), COMBINED  12/31/2013    Procedure: COMBINED ESOPHAGOSCOPY, GASTROSCOPY, DUODENOSCOPY (EGD), BIOPSY SINGLE OR MULTIPLE;;  Surgeon: Clemente Lopez MD;  Location:  [facility]     ESOPHAGOSCOPY, GASTROSCOPY, DUODENOSCOPY (EGD), COMBINED  4/1/2014    Procedure: COMBINED ESOPHAGOSCOPY, GASTROSCOPY, DUODENOSCOPY (EGD);;  Surgeon: Clemente Lopez MD;  [facility]     ESOPHAGOSCOPY, GASTROSCOPY, DUODENOSCOPY (EGD), COMBINED   6/28/2014    Procedure: COMBINED ESOPHAGOSCOPY, GASTROSCOPY, DUODENOSCOPY (EGD);  Surgeon: Clemente Lopez MD;  [facility]     ESOPHAGOSCOPY, GASTROSCOPY, DUODENOSCOPY (EGD), COMBINED N/A 8/20/2014    Procedure: COMBINED ESOPHAGOSCOPY, GASTROSCOPY, DUODENOSCOPY (EGD), BIOPSY SINGLE OR MULTIPLE;  Surgeon: Duane, William Charles, MD;  [facility]: [facility]     ESOPHAGOSCOPY, GASTROSCOPY, DUODENOSCOPY (EGD), COMBINED N/A 8/22/2014    Procedure: COMBINED ESOPHAGOSCOPY, GASTROSCOPY, DUODENOSCOPY (EGD), BIOPSY SINGLE OR MULTIPLE;  Surgeon: Mello Ferrer MD;  [facility]     ESOPHAGOSCOPY, GASTROSCOPY, DUODENOSCOPY (EGD), COMBINED N/A 10/2/2014    Procedure: COMBINED ESOPHAGOSCOPY, GASTROSCOPY, DUODENOSCOPY (EGD), BIOPSY SINGLE OR MULTIPLE;  Surgeon: Remy Haskins MD;  [facility]     ESOPHAGOSCOPY, GASTROSCOPY, DUODENOSCOPY (EGD), COMBINED Left 12/15/2014    Procedure: COMBINED ESOPHAGOSCOPY, GASTROSCOPY, DUODENOSCOPY (EGD), BIOPSY SINGLE OR MULTIPLE;  Surgeon: Remy Haskins MD;  [facility]     ESOPHAGOSCOPY, GASTROSCOPY, DUODENOSCOPY (EGD), COMBINED N/A 2/25/2015    Procedure: COMBINED ENDOSCOPIC ULTRASOUND, ESOPHAGOSCOPY, GASTROSCOPY, DUODENOSCOPY (EGD), FINE NEEDLE ASPIRATE/BIOPSY;  Surgeon: Clemente Lugo MD;  [facility]     ESOPHAGOSCOPY, GASTROSCOPY, DUODENOSCOPY (EGD), COMBINED Left 2/25/2015    Procedure: COMBINED ESOPHAGOSCOPY, GASTROSCOPY, DUODENOSCOPY (EGD), BIOPSY SINGLE OR MULTIPLE;  Surgeon: Clemente Lugo MD;  [facility]     ESOPHAGOSCOPY, GASTROSCOPY, DUODENOSCOPY (EGD), COMBINED N/A 9/25/2016    Procedure: COMBINED ESOPHAGOSCOPY, GASTROSCOPY, DUODENOSCOPY (EGD);  Surgeon: Aziza Patiño MD;  [facility]     ESOPHAGOSCOPY, GASTROSCOPY, DUODENOSCOPY (EGD), COMBINED N/A 1/18/2017    Procedure: COMBINED ESOPHAGOSCOPY, GASTROSCOPY, DUODENOSCOPY (EGD), BIOPSY SINGLE OR MULTIPLE;  Surgeon: Clemente Lopez MD;  Location:  [facility]     ESOPHAGOSCOPY,  GASTROSCOPY, DUODENOSCOPY (EGD), COMBINED N/A 11/26/2017    Procedure: COMBINED ESOPHAGOSCOPY, GASTROSCOPY, DUODENOSCOPY (EGD), REMOVE FOREIGN BODY;  Esophagogastroduodenoscopy with foreign body extraction  ;  Surgeon: Herberth Castrejon MD;  [facility]     ESOPHAGOSCOPY, GASTROSCOPY, DUODENOSCOPY (EGD), COMBINED N/A 11/26/2017    Procedure: COMBINED ESOPHAGOSCOPY, GASTROSCOPY, DUODENOSCOPY (EGD), REMOVE FOREIGN BODY;;  Surgeon: Herberth Castrejon MD;  [facility]     ESOPHAGOSCOPY, GASTROSCOPY, DUODENOSCOPY (EGD), COMBINED N/A 4/10/2020    Procedure: ESOPHAGOGASTRODUODENOSCOPY (EGD);  Surgeon: Yael Cabral MD;  [facility]     FASCIOTOMY LOWER EXTREMITY Left 6/10/2016    Procedure: FASCIOTOMY LOWER EXTREMITY;  Surgeon: Mello Rodriguez MD;  [facility]     HC CAPSULE ENDOSCOPY N/A 8/25/2014    Procedure: CAPSULE/PILL CAM ENDOSCOPY;  Surgeon: Remy Haskins MD;  [facility]     HC CAPSULE ENDOSCOPY N/A 10/2/2014    Procedure: CAPSULE/PILL CAM ENDOSCOPY;  Surgeon: Remy Haskins MD;  [facility]     INJECT BLOCK MEDIAL BRANCH CERVICAL/THORACIC/LUMBAR Left 10/30/2020    Procedure: Cervical 3, 4, and 5 Medial Branch Block;  Surgeon: Evelyn Lima MD;  Location: [facility] OR     ORTHOPEDIC SURGERY      broken wrist repair     REVISE CATHETER INTRATHECAL  08/24/2020     SEX TRANSFORMATION SURGERY, MALE TO FEMALE  1974 1974     SINUS SURGERY      cyst removed     TONSILLECTOMY       VASCULAR SURGERY  2006    Left carotid stent     Allergies:  Allergies   Allergen Reactions     Bee Venom Anaphylaxis     Penicillins Anaphylaxis     Dilantin [Phenytoin] Other (See Comments)     Generic dilantin only per pt     Iodine Hives     Novocaine [Procaine] Hives     Tositumomab Unknown     Social History:  Social History     Socioeconomic History     Marital status:      Spouse name: Not on file     Number of children: 2     Years of education: Not on file     Highest  education level: Not on file   Occupational History     Occupation: Retired   Social Needs     Financial resource strain: Not on file     Food insecurity     Worry: Not on file     Inability: Not on file     Transportation needs     Medical: Not on file     Non-medical: Not on file   Tobacco Use     Smoking status: Current Every Day Smoker     Packs/day: 1.00     Years: 30.00     Pack years: 30.00     Types: Cigarettes, Cigars     Smokeless tobacco: Never Used     Tobacco comment: 1.5 packs per day    Substance and Sexual Activity     Alcohol use: No     Alcohol/week: 0.0 standard drinks     Drug use: No     Sexual activity: Not Currently   Lifestyle     Physical activity     Days per week: Not on file     Minutes per session: Not on file     Stress: Not on file   Relationships     Social connections     Talks on phone: Not on file     Gets together: Not on file     Attends Orthodox service: Not on file     Active member of club or organization: Not on file     Attends meetings of clubs or organizations: Not on file     Relationship status: Not on file     Intimate partner violence     Fear of current or ex partner: Not on file     Emotionally abused: Not on file     Physically abused: Not on file     Forced sexual activity: Not on file   Other Topics Concern     Parent/sibling w/ CABG, MI or angioplasty before 65F 55M? Not Asked      Service Not Asked     Blood Transfusions Not Asked     Caffeine Concern No     Comment: 1 in the morning     Occupational Exposure Not Asked     Hobby Hazards Not Asked     Sleep Concern Not Asked     Stress Concern Not Asked     Weight Concern Not Asked     Special Diet Not Asked     Back Care Not Asked     Exercise Not Asked     Bike Helmet Not Asked     Seat Belt Not Asked     Self-Exams Not Asked   Social History Narrative    Living in a nursing home (as of 2020) for prosthetic fitting and therapy; [facility]    Two adopted children (Kam and Prashanth) and 3  grandchildren (as of 2020).     Medications:  Current Outpatient Medications   Medication Sig Dispense Refill     ACETAMINOPHEN EXTRA STRENGTH 500 MG tablet TAKE 1 TABLET BY MOUTH EVERY 6 HOURS AS NEEDED FOR PAIN / FEVER 90 tablet 5     ADVAIR DISKUS 250-50 MCG/DOSE inhaler INHALE 1 PUFF BY MOUTH INTO THE LUNGS TWICE DAILY 60 each 1     albuterol (PROAIR HFA/PROVENTIL HFA/VENTOLIN HFA) 108 (90 Base) MCG/ACT inhaler Inhale 2 puffs into the lungs every 6 hours 8.5 g 5     albuterol (PROVENTIL) (2.5 MG/3ML) 0.083% neb solution Take 1 vial (2.5 mg) by nebulization every 6 hours as needed for shortness of breath / dyspnea or wheezing 180 mL 3     alendronate (FOSAMAX) 70 MG tablet TAKE ONE TABLET BY MOUTH EVERY 7 DAYS (TAKE WITH 8OZ OF WATER 30 MINUTES BEFORE BREAKFAST AND REMAIN UPRIGHT DURING THIS TIME) 4 tablet 97     ASPERCREME LIDOCAINE 4 % Patch APPLY 1 PATCH TOPICALLY TO LOWER BACK ONCE DAILY (ON FOR 12 HOURS, OFF FOR 12 HOURS) 30 patch 10     ASPIRIN LOW DOSE 81 MG chewable tablet CHEW AND SWALLOW ONE TABLET BY MOUTH IN THE MORNING 28 tablet 10     atorvastatin (LIPITOR) 40 MG tablet TAKE 1 TABLET BY MOUTH AT BEDTIME 28 tablet 10     blood glucose monitoring (ONE TOUCH ULTRA 2) meter device kit Use to test blood sugars 3 times daily or as directed. 1 kit 0     blood glucose monitoring (ONE TOUCH ULTRA) test strip Use to test blood sugars 3 times daily or as directed. 3 Box 3     blood glucose monitoring (ONE TOUCH ULTRASOFT) lancets Use to test blood sugar 3 times daily or as directed. 100 each PRN     brimonidine (ALPHAGAN) 0.2 % ophthalmic solution Place 1 drop into the right eye 2 times daily ( 12 hours apart).    Please keep 11-16-20 appt for refills. 5 mL 4     capsaicin-menthol-methyl sal 0.025-1-12 % external cream Apply 1 Application topically daily as needed (topical pain) 56.6 g 1     diclofenac (VOLTAREN) 1 % topical gel APPLY 2 GRAMS TOPICALLY TO AFFECTED AREA(S) THREE TIMES A  g 11      dorzolamide (TRUSOPT) 2 % ophthalmic solution Place 1 drop into the right eye 2 times daily 1 Bottle 1     EPINEPHrine (ANY BX GENERIC EQUIV) 0.3 MG/0.3ML injection 2-pack INJECT 0.3 ML INTO THE MUSCLE AS NEEDED FOR ANAPHYLAXIS 2 each 1     escitalopram (LEXAPRO) 10 MG tablet TAKE 1 TABLET BY MOUTH ONCE DAILY 28 tablet 10     FEROSUL 325 (65 Fe) MG tablet TAKE 1 TABLET BY MOUTH TWICE DAILY WITH MEALS 56 tablet 10     fluticasone (FLONASE) 50 MCG/ACT nasal spray SPRAY 2 SPRAYS IN EACH NOSTRIL DAILY 16 g 11     gabapentin (NEURONTIN) 100 MG capsule Take 1 capsule (100 mg) by mouth 3 times daily 60 capsule 0     INCRUSE ELLIPTA 62.5 MCG/INH Inhaler INHALE 1 PUFF BY MOUTH ONCE DAILY 30 each 10     lactulose (CHRONULAC) 10 GM/15ML solution TAKE 30ML (20MG) BY MOUTH AS NEEDED FOR CONSTIPATION 473 mL 1     latanoprost (XALATAN) 0.005 % ophthalmic solution INSTILL ONE DROP IN EACH EYE AT BEDTIME 2.5 mL 10     levETIRAcetam (KEPPRA) 500 MG tablet TAKE 1 TABLET BY MOUTH TWICE DAILY 56 tablet 11     lisinopril (ZESTRIL) 20 MG tablet TAKE 1 TABLET BY MOUTH EVERY MORNING 28 tablet 11     loratadine (CLARITIN) 10 MG tablet TAKE 1 TABLET BY MOUTH ONCE DAILY 28 tablet 11     Medication given by intrathecal pump continuous prn Drug # 1: Fentanyl (Sublimaze).    Conc:2000  mcg/mL  Total Dose / 24 hours: 600  mcg  Drug # 2: Bupivacaine (Marcaine) .    Conc:20  mg/mL  Total Dose / 24 hours: 6  mg  Drug # 3: Morphine (Duramorph or Infumorph) .    Conc:3.7  mg/mL  Total Dose / 24 hours: 1.11  mg   Diluent: NS       Menthol, Topical Analgesic, 5 % GEL Externally apply topically daily as needed 113 g 0     metFORMIN (GLUCOPHAGE) 500 MG tablet TAKE 1 TABLET BY MOUTH EVERY EVENING WITH DINNER 28 tablet 11     metoprolol succinate ER (TOPROL-XL) 50 MG 24 hr tablet TAKE 1 TABLET BY MOUTH EVERY MORNING 30 tablet 10     Multiple Vitamins-Minerals (TAB-A-MACY) TABS TAKE 1 TABLET BY MOUTH ONCE DAILY 28 tablet 11     multivitamin, therapeutic  (THERA-VIT) TABS tablet Take 1 tablet by mouth daily       mupirocin (BACTROBAN) 2 % external ointment Apply 1 each topically daily nares       nicotine (NICODERM CQ) 14 MG/24HR 24 hr patch APPLY 1 PATCH TOPICALLY DAILY ( EVERY 24 HOURS ) 28 patch 3     nitroGLYcerin (NITROSTAT) 0.4 MG sublingual tablet For chest pain place 1 tablet under the tongue every 5 minutes for 3 doses. If symptoms persist 5 minutes after 1st dose call 911. 25 tablet 3     Nutritional Supplements (ENSURE) LIQD DRINK ONE CAN / BOTTLE BY MOUTH TWICE DAILY WITH MEALS 82178 mL 11     oxyCODONE (OXY-IR) 5 MG capsule Take 1-2 capsules by mouth every 4 hours as needed for severe pain       pantoprazole (PROTONIX) 40 MG EC tablet TAKE 1 TABLET BY MOUTH EVERY MORNING 28 tablet 11     phenytoin (DILANTIN) 100 MG capsule TAKE 2 CAPS (200MG) BY MOUTH TWICE A  capsule 11     polyethylene glycol (MIRALAX) 17 GM/SCOOP powder MIX 17GM OF POWDER IN 8OZ OF WATER UNTIL COMPLETELY DISSOLVED. DRINK SOLUTION BY MOUTH IN THE MORNING 510 g 8     pregabalin (LYRICA) 150 MG capsule TAKE 1 CAPSULE BY MOUTH THREE TIMES DAILY 90 capsule 4     risperiDONE (RISPERDAL) 0.5 MG tablet TAKE 1 TABLET BY MOUTH AT BEDTIME 28 tablet 11     SENEXON-S 8.6-50 MG tablet TAKE 1 TABLET BY MOUTH TWICE DAILY 56 tablet 10     solifenacin (VESICARE) 10 MG tablet TAKE 1 TABLET BY MOUTH EVERY MORNING 28 tablet 10     traZODone (DESYREL) 150 MG tablet TAKE 1 TABLET BY MOUTH AT BEDTIME 28 tablet 10     vitamin D3 (CHOLECALCIFEROL) 10 MCG (400 UNIT) capsule Take 2 capsules by mouth daily       Vitamin D3 (VITAMIN D) 10 MCG (400 UNIT) tablet TAKE TWO TABLETS (800 UNITS) BY MOUTH ONCE DAILY 56 tablet 11     Family History:  Family History   Problem Relation Age of Onset     Dementia Mother      Diabetes Mother         type unknown     Coronary Artery Disease Mother         MI     Glaucoma Father      Diabetes Father         type unknown     Heart Failure Father      Schizophrenia Brother       Depression Brother      Suicide Sister      Depression Sister      Diabetes Sister      Ovarian Cancer Maternal Aunt      Leukemia Maternal Aunt      Diabetes Maternal Grandmother      Diabetes Maternal Grandfather      Diabetes Paternal Grandmother      Diabetes Paternal Grandfather      Breast Cancer Sister      Cerebrovascular Disease Brother      Colon Cancer No family hx of      Macular Degeneration No family hx of        Physical Exam:  There were no vitals taken for this visit.    General: Sitting in the chair in NAD  HEENT: anicteric, moist mucosa, no thrush  Neck: no palpable lymphadenopathy, no JVD noted  Chest: CTAB, no wheezing  Cardiac: RRR no murmurs  Abdomen: Soft, flat, non tender, active BS  Extremities: No LE Edema  Neuro: A&Ox3, no focal defecits  Skin: no rash noted    Labs and Radiology - prior reviewed, none new.    6/10/20 CTA  FINDINGS: Diffuse esophageal wall thickening, especially proximally  and distally. Small hiatal hernia. If there is history of dysphagia  comment please correlate with esophagram and/or upper GI endoscopy as  clinically appropriate. Left atrial cardiac chamber is enlarged. Upper  abdomen was grossly unremarkable. Thyroid appears grossly  unremarkable. Subcentimeter right hilar lymph node appears unchanged.  No enlarged axillary or mediastinal lymph nodes. Atherosclerotic  calcification in aorta and coronary arteries. No dominant pulmonary  nodule or groundglass opacity. Areas of scarring and subsegmental  atelectasis are present in both lung bases, slightly increased from  previous. Bones show degenerative disc changes throughout the thoracic  spine. Excessive kyphosis in the thorax.  The pulmonary artery tree was well-opacified with contrast comment no  evidence of film defect indicate embolus.  IMPRESSION: No CT evidence for pulmonary embolus. Esophageal  thickening comment please consider nonemergent esophagram or upper GI  endoscopy as clinically appropriate.  Small hiatal hernia. Aortic and  coronary artery atherosclerosis. Bibasilar atelectasis and scarring  bilaterally. Excessive thoracic kyphosis.     June 2020 Echo  Interpretation Summary  Global and regional left ventricular function is normal with an EF of 60-65%.  No regional wall motion abnormalities are seen.  Right ventricular function, chamber size, wall motion, and thickness are  normal.  No hemodynamically significant valvular abnoramalities were noted.  The inferior vena cava was normal in size with preserved respiratory  variability.  No pericardial effusion is present.  This study was compared with the study from 11/13/2019 . There has been no  change.    PFT's:  FVC: 2.94L (102%)  FEV1:2.46L (109%)  FEV1/FVC: 84%  DLCO: 13.67 (63%)    MIP & MEP were hindered by effort and are not reliable.        Again, thank you for allowing me to participate in the care of your patient.        Sincerely,        Rl Barroso MD

## 2021-04-14 NOTE — PATIENT INSTRUCTIONS
We'll increase your asthma medication called advair. Stop the old Advair and start the new Advair.    We'll order for you to get lung exercise called pulmonary rehab.     We'll have you see the speech therapy folks to look for vocal cord dysfunction.

## 2021-04-14 NOTE — NURSING NOTE
Chief Complaint   Patient presents with     RECHECK     COPD follow up      Medications reviewed and updated.  Vitals taken  Silvina Lim CMA

## 2021-04-14 NOTE — TELEPHONE ENCOUNTER
Pt and assisted living nurse calling requesting to do a urine specimen as pt is having burning, pain and leaking of urine . Pt also has bedsore on both side of cheeks .Please send an order to check urine fir UTI and treatment for ulcers on buttocks.

## 2021-04-14 NOTE — TELEPHONE ENCOUNTER
Left message for patient to call back.  Also left message for nurse to call back at her facility.     See message below from Dr. Casey. Also, do they need treatment for her bedsores/ulcers? Please get more info,

## 2021-04-15 ENCOUNTER — RECORDS - HEALTHEAST (OUTPATIENT)
Dept: LAB | Facility: CLINIC | Age: 71
End: 2021-04-15

## 2021-04-15 ENCOUNTER — TRANSFERRED RECORDS (OUTPATIENT)
Dept: HEALTH INFORMATION MANAGEMENT | Facility: CLINIC | Age: 71
End: 2021-04-15

## 2021-04-15 NOTE — TELEPHONE ENCOUNTER
Ludy AMAYA, bottom is bony and tender. No open skin. No redness. Ludy AMAYA advised frequent turning.   Phone for assisted living [phone] nurses    Nurse requests that order for urinalysis be faxed to [facility] at [phone].  Order faxed as requested.   Paulina Velez RN

## 2021-04-16 ENCOUNTER — TELEPHONE (OUTPATIENT)
Dept: INTERNAL MEDICINE | Facility: CLINIC | Age: 71
End: 2021-04-16

## 2021-04-16 ENCOUNTER — HOSPITAL ENCOUNTER (EMERGENCY)
Facility: CLINIC | Age: 71
Discharge: HOME | End: 2021-04-16
Attending: EMERGENCY MEDICINE | Admitting: EMERGENCY MEDICINE
Payer: COMMERCIAL

## 2021-04-16 VITALS
DIASTOLIC BLOOD PRESSURE: 53 MMHG | HEIGHT: 55 IN | WEIGHT: 125 LBS | SYSTOLIC BLOOD PRESSURE: 131 MMHG | TEMPERATURE: 97.8 F | HEART RATE: 59 BPM | OXYGEN SATURATION: 97 % | RESPIRATION RATE: 18 BRPM | BODY MASS INDEX: 28.93 KG/M2

## 2021-04-16 DIAGNOSIS — N39.0 URINARY TRACT INFECTION ASSOCIATED WITH INDWELLING URETHRAL CATHETER, INITIAL ENCOUNTER (H): ICD-10-CM

## 2021-04-16 DIAGNOSIS — R31.9 URINARY TRACT INFECTION WITH HEMATURIA, SITE UNSPECIFIED: Primary | ICD-10-CM

## 2021-04-16 DIAGNOSIS — T83.511A URINARY TRACT INFECTION ASSOCIATED WITH INDWELLING URETHRAL CATHETER, INITIAL ENCOUNTER (H): ICD-10-CM

## 2021-04-16 DIAGNOSIS — N39.0 URINARY TRACT INFECTION WITH HEMATURIA, SITE UNSPECIFIED: Primary | ICD-10-CM

## 2021-04-16 LAB
ALBUMIN UR-MCNC: NEGATIVE MG/DL
ANION GAP SERPL CALCULATED.3IONS-SCNC: 4 MMOL/L (ref 3–14)
APPEARANCE UR: ABNORMAL
BACTERIA #/AREA URNS HPF: ABNORMAL /HPF
BASOPHILS # BLD AUTO: 0.1 10E9/L (ref 0–0.2)
BASOPHILS NFR BLD AUTO: 0.5 %
BILIRUB UR QL STRIP: NEGATIVE
BUN SERPL-MCNC: 12 MG/DL (ref 7–30)
CALCIUM SERPL-MCNC: 8.9 MG/DL (ref 8.5–10.1)
CHLORIDE SERPL-SCNC: 105 MMOL/L (ref 94–109)
CO2 SERPL-SCNC: 28 MMOL/L (ref 20–32)
COLOR UR AUTO: ABNORMAL
CREAT SERPL-MCNC: 0.58 MG/DL (ref 0.52–1.04)
DIFFERENTIAL METHOD BLD: NORMAL
EOSINOPHIL # BLD AUTO: 0.3 10E9/L (ref 0–0.7)
EOSINOPHIL NFR BLD AUTO: 3.6 %
ERYTHROCYTE [DISTWIDTH] IN BLOOD BY AUTOMATED COUNT: 13.3 % (ref 10–15)
GFR SERPL CREATININE-BSD FRML MDRD: >90 ML/MIN/{1.73_M2}
GLUCOSE SERPL-MCNC: 79 MG/DL (ref 70–99)
GLUCOSE UR STRIP-MCNC: NEGATIVE MG/DL
HCT VFR BLD AUTO: 35 % (ref 35–47)
HGB BLD-MCNC: 11.7 G/DL (ref 11.7–15.7)
HGB UR QL STRIP: ABNORMAL
IMM GRANULOCYTES # BLD: 0 10E9/L (ref 0–0.4)
IMM GRANULOCYTES NFR BLD: 0.3 %
KETONES UR STRIP-MCNC: NEGATIVE MG/DL
LEUKOCYTE ESTERASE UR QL STRIP: ABNORMAL
LYMPHOCYTES # BLD AUTO: 2.2 10E9/L (ref 0.8–5.3)
LYMPHOCYTES NFR BLD AUTO: 23.9 %
MCH RBC QN AUTO: 30.7 PG (ref 26.5–33)
MCHC RBC AUTO-ENTMCNC: 33.4 G/DL (ref 31.5–36.5)
MCV RBC AUTO: 92 FL (ref 78–100)
MONOCYTES # BLD AUTO: 1 10E9/L (ref 0–1.3)
MONOCYTES NFR BLD AUTO: 10.7 %
NEUTROPHILS # BLD AUTO: 5.6 10E9/L (ref 1.6–8.3)
NEUTROPHILS NFR BLD AUTO: 61 %
NITRATE UR QL: POSITIVE
NRBC # BLD AUTO: 0 10*3/UL
NRBC BLD AUTO-RTO: 0 /100
PH UR STRIP: 6.5 PH (ref 5–7)
PLATELET # BLD AUTO: 257 10E9/L (ref 150–450)
POTASSIUM SERPL-SCNC: 3.9 MMOL/L (ref 3.4–5.3)
RBC # BLD AUTO: 3.81 10E12/L (ref 3.8–5.2)
RBC #/AREA URNS AUTO: 4 /HPF (ref 0–2)
SODIUM SERPL-SCNC: 138 MMOL/L (ref 133–144)
SOURCE: ABNORMAL
SP GR UR STRIP: 1 (ref 1–1.03)
SQUAMOUS #/AREA URNS AUTO: 0 /HPF (ref 0–1)
UROBILINOGEN UR STRIP-MCNC: NORMAL MG/DL (ref 0–2)
WBC # BLD AUTO: 9.2 10E9/L (ref 4–11)
WBC #/AREA URNS AUTO: 18 /HPF (ref 0–5)

## 2021-04-16 PROCEDURE — 85025 COMPLETE CBC W/AUTO DIFF WBC: CPT | Performed by: EMERGENCY MEDICINE

## 2021-04-16 PROCEDURE — 999N000127 HC STATISTIC PERIPHERAL IV START W US GUIDANCE

## 2021-04-16 PROCEDURE — 87086 URINE CULTURE/COLONY COUNT: CPT | Performed by: EMERGENCY MEDICINE

## 2021-04-16 PROCEDURE — 81001 URINALYSIS AUTO W/SCOPE: CPT | Performed by: EMERGENCY MEDICINE

## 2021-04-16 PROCEDURE — 250N000013 HC RX MED GY IP 250 OP 250 PS 637: Performed by: EMERGENCY MEDICINE

## 2021-04-16 PROCEDURE — 80048 BASIC METABOLIC PNL TOTAL CA: CPT | Performed by: EMERGENCY MEDICINE

## 2021-04-16 PROCEDURE — 99284 EMERGENCY DEPT VISIT MOD MDM: CPT | Performed by: EMERGENCY MEDICINE

## 2021-04-16 PROCEDURE — 99283 EMERGENCY DEPT VISIT LOW MDM: CPT | Performed by: EMERGENCY MEDICINE

## 2021-04-16 RX ORDER — NITROFURANTOIN 25; 75 MG/1; MG/1
100 CAPSULE ORAL EVERY 12 HOURS SCHEDULED
Status: DISCONTINUED | OUTPATIENT
Start: 2021-04-16 | End: 2021-04-16

## 2021-04-16 RX ORDER — NITROFURANTOIN 25; 75 MG/1; MG/1
100 CAPSULE ORAL ONCE
Status: COMPLETED | OUTPATIENT
Start: 2021-04-16 | End: 2021-04-16

## 2021-04-16 RX ORDER — ACETAMINOPHEN 500 MG
1000 TABLET ORAL ONCE
Status: COMPLETED | OUTPATIENT
Start: 2021-04-16 | End: 2021-04-16

## 2021-04-16 RX ORDER — OXYCODONE AND ACETAMINOPHEN 5; 325 MG/1; MG/1
1 TABLET ORAL ONCE
Status: COMPLETED | OUTPATIENT
Start: 2021-04-16 | End: 2021-04-16

## 2021-04-16 RX ORDER — NITROFURANTOIN 25; 75 MG/1; MG/1
100 CAPSULE ORAL 2 TIMES DAILY
Qty: 14 CAPSULE | Refills: 0 | Status: SHIPPED | OUTPATIENT
Start: 2021-04-16 | End: 2021-04-23

## 2021-04-16 RX ADMIN — NITROFURANTOIN (MONOHYDRATE/MACROCRYSTALS) 100 MG: 75; 25 CAPSULE ORAL at 19:19

## 2021-04-16 RX ADMIN — OXYCODONE HYDROCHLORIDE AND ACETAMINOPHEN 1 TABLET: 5; 325 TABLET ORAL at 19:17

## 2021-04-16 RX ADMIN — ACETAMINOPHEN 1000 MG: 500 TABLET, FILM COATED ORAL at 17:20

## 2021-04-16 ASSESSMENT — ENCOUNTER SYMPTOMS
ABDOMINAL PAIN: 1
BACK PAIN: 1
HEMATURIA: 1
FLANK PAIN: 1
DYSURIA: 1

## 2021-04-16 ASSESSMENT — MIFFLIN-ST. JEOR: SCORE: 728.63

## 2021-04-16 NOTE — TELEPHONE ENCOUNTER
UA report reviewed  (paper fax) with 157 WBC in clumps and 182 RBCs, cloudy with  Positive Nitrites and LE. No urine cx results  Prior hx citrobacter with multiple resistances   Normal GFR 4 mos ago  Abx choices limited.  Has PCN allergy. Septra reacts with Risperdal. Cipro reacts with Lexapro. Though age >65, with normal GFR and limited options, will treat with Nitrofurantoin for 7 days  Inform pt's assisted living. Rx faxed.  Await Ucx results. Assisted living to send Dr Casey results when available

## 2021-04-16 NOTE — TELEPHONE ENCOUNTER
[facility] (# [phone]) notified, and pt will start taking ABX:  Nitrofurantoin Monohyd Macro 100 MG Oral Capsule (MACROBID) BID for 7 days.

## 2021-04-16 NOTE — TELEPHONE ENCOUNTER
Chicho, RN called. Patient had UA completed at Assisted Living facility. Results  indicate a UTI and RN is calling for orders.  RN stated he faxed results to [facility].    Dr Worrell:     UA results placed in your folder at the Nursing Station. UC still pending.    Assisted Living is requesting treatment this afternoon to prevent problems over the weekend.  Can send RX to the [facility] pharmacy    Paulina Castanon, MSN, RN  Triage RN  [facility]

## 2021-04-16 NOTE — ED PROVIDER NOTES
"    [facility] EMERGENCY DEPARTMENT ([facility])  4/16/21   History     Chief Complaint   Patient presents with     Rule out Urinary Tract Infection     The history is provided by the patient and medical records.     Sonya Foote is a 71 year old female with PMH notable for CAD s/p MI, CHF (EF 60-65%), PAD s/p bilateral AKAs, CVA x3, JOSE, GERD with consequent esophageal stricture, hiatal hernia, epilepsy, sickle cell trait, androgen insensitivy syndrome, chronic pain with fentanyl pump, and blindness presenting to the Emergency Department via EMS from [facility] for treatment of known UTI.  Patient reports she has had abdominal pain and bilateral flank pain for more than 4 days.  She endorses hematuria and dysuria.  Patient reports she had a urine test two days ago that was significant for UTI, however, she has not yet been started on antibiotics.  Patient states she has had sepsis from UTIs 5 times in the past and has concern for sepsis again.  She notes some pubic pain.  Patient reports back pain \"all over\", but there is no worsening or new pain.  She does report she has had some falls out of her bed and chair recently.  Patient reports the skin over her tailbone is getting so thin and is causing her pain.     Past Medical History  Past Medical History:   Diagnosis Date     Acid reflux disease      Benign essential hypertension      Blind left eye     due to central retinal artery occlusion     CAD (coronary artery disease)     UA and inferior MI in 2016 s/p PCI     Chest pain on breathing 06/10/2020     Chronic back pain      Chronic neck pain      Chronic pain syndrome     with fentanyl intrathecal pain pump     Complex sleep apnea syndrome      COPD (chronic obstructive pulmonary disease) (H)      Dysphagia     chronic due to esophageal strictures and multiple previous dilitations      ROGER (generalized anxiety disorder)      History of blood transfusion     x5; no adverse reactions     " History of central retinal artery occlusion     left-sided 2006     History of stroke     residual left-sided weakness     Hx of carotid artery stenosis     s/p left carotid stent in 2006 and balloon angioplasty in 2016     MDD (major depressive disorder)      Neurogenic bladder     SPT in place     JOSE (obstructive sleep apnea)      Osteoporosis      PAD (peripheral artery disease) (H)      Peripheral neuropathy      Person who has had sex change operation     male to female     Seizure disorder (H)     many years since last grand mal; daily, brief petit mals     Sickle cell trait (H)      Type 2 diabetes mellitus (H)      Past Surgical History:   Procedure Laterality Date     AMPUTATE LEG ABOVE KNEE Left 6/11/2016    Procedure: AMPUTATE LEG ABOVE KNEE;  Surgeon: Mello Rodriguez MD;  [facility]     AMPUTATE LEG BELOW KNEE Right 11/7/2016    Procedure: AMPUTATE LEG BELOW KNEE;  Surgeon: Savannah Durant MD;  [facility]     AMPUTATE REVISION STUMP LOWER EXTREMITY Right 11/11/2016    Procedure: AMPUTATE REVISION STUMP LOWER EXTREMITY;  Surgeon: Savannah Durant MD;  [facility]     AMPUTATE REVISION STUMP LOWER EXTREMITY Right 11/16/2016    Procedure: AMPUTATE REVISION STUMP LOWER EXTREMITY;  Surgeon: Savannah Durant MD;  [facility]     AMPUTATE TOE(S) Right 1/5/2016    Procedure: AMPUTATE TOE(S);  Surgeon: Mello Gaines DPM;  Location:  [facility]     ANGIOGRAM Bilateral 11/21/2014    Procedure: ANGIOGRAM;  Surgeon: Savannah Durant MD;  [facility]     ANGIOGRAM Left 1/16/2015    Procedure: ANGIOGRAM;  Surgeon: Savannah Durant MD;  [facility]     ANGIOGRAM Bilateral 9/14/2015    Procedure: ANGIOGRAM;  Surgeon: Savannah Durant MD;  [facility]     ANGIOGRAM Left 10/12/2015    Procedure: ANGIOGRAM;  Surgeon: Savannah Durant MD;  [facility]     ANGIOGRAM Right 6/6/2016    Procedure: ANGIOGRAM;  Surgeon: Savannah Durant MD;  [facility]     ANGIOPLASTY Right 6/6/2016    Procedure: ANGIOPLASTY;   Surgeon: Savannah Durant MD;  [facility]     APPENDECTOMY       BREAST BIOPSY, RT/LT Right     benign     CATARACT IOL, RT/LT Right      CHOLECYSTECTOMY       COLONOSCOPY N/A 8/25/2014    Procedure: COLONOSCOPY;  Surgeon: Mello Ferrer MD;  [facility]     COLONOSCOPY WITH CO2 INSUFFLATION N/A 8/20/2014    Procedure: COLONOSCOPY WITH CO2 INSUFFLATION;  Surgeon: Duane, William Charles, MD;  Location: [facility] OR     CYSTOSCOPY, INJECT BOTOX N/A 5/21/2020    Procedure: CYSTOSCOPY, WITH BOTULINUM TOXIN INJECTION;  Surgeon: Loki Gordon MD;  [facility]     CYSTOSCOPY, INJECT BOTOX N/A 10/8/2020    Procedure: CYSTOSCOPY, INJECT BOTOX INTO BLADDER, SUPRAPUBIC TUBE EXCHNAGE;  Surgeon: Loki Gordon MD;  [facility]     CYSTOSCOPY, INJECT BOTOX N/A 1/7/2021    Procedure: CYSTOSCOPY, WITH BOTULINUM TOXIN INJECTION;  Surgeon: Loki Gordon MD;  [facility]     CYSTOSCOPY, INTRAVESICAL INJECTION N/A 10/31/2019    Procedure: CYSTOSCOPY, BOTOX INJECTION, Suprapubic Catheter Exchange;  Surgeon: Loki Gordon MD;  [facility]     CYSTOSTOMY, INSERT TUBE SUPRAPUBIC, COMBINED N/A 1/16/2018    Procedure: COMBINED CYSTOSTOMY, INSERT TUBE SUPRAPUBIC;  Cystoscopy, Intraoperative Ultrasound, Suprapubic Tube Placement;  Surgeon: Keanu Dawson MD;  [facility]     ENDARTERECTOMY FEMORAL  5/23/2014    Procedure: ENDARTERECTOMY FEMORAL;  Surgeon: Jason Joshi MD;  [facility]     ESOPHAGOSCOPY, GASTROSCOPY, DUODENOSCOPY (EGD), COMBINED  12/14/2012    Procedure: COMBINED ESOPHAGOSCOPY, GASTROSCOPY, DUODENOSCOPY (EGD), BIOPSY SINGLE OR MULTIPLE;  ESOPHAGOSCOPY, GASTROSCOPY, DUODENOSCOPY (EGD), DILATATION ;  Surgeon: Elizabeth Stevenson MD;  Location:  GI     ESOPHAGOSCOPY, GASTROSCOPY, DUODENOSCOPY (EGD), COMBINED  12/31/2013    Procedure: COMBINED ESOPHAGOSCOPY, GASTROSCOPY, DUODENOSCOPY (EGD), BIOPSY SINGLE OR MULTIPLE;;  Surgeon: Clemente Lopez MD;   [facility]     ESOPHAGOSCOPY, GASTROSCOPY, DUODENOSCOPY (EGD), COMBINED  4/1/2014    Procedure: COMBINED ESOPHAGOSCOPY, GASTROSCOPY, DUODENOSCOPY (EGD);;  Surgeon: Clemente Lopez MD;  [facility]     ESOPHAGOSCOPY, GASTROSCOPY, DUODENOSCOPY (EGD), COMBINED  6/28/2014    Procedure: COMBINED ESOPHAGOSCOPY, GASTROSCOPY, DUODENOSCOPY (EGD);  Surgeon: Clemente Lopez MD;  [facility]     ESOPHAGOSCOPY, GASTROSCOPY, DUODENOSCOPY (EGD), COMBINED N/A 8/20/2014    Procedure: COMBINED ESOPHAGOSCOPY, GASTROSCOPY, DUODENOSCOPY (EGD), BIOPSY SINGLE OR MULTIPLE;  Surgeon: Duane, William Charles, MD;  Location:  OR     ESOPHAGOSCOPY, GASTROSCOPY, DUODENOSCOPY (EGD), COMBINED N/A 8/22/2014    Procedure: COMBINED ESOPHAGOSCOPY, GASTROSCOPY, DUODENOSCOPY (EGD), BIOPSY SINGLE OR MULTIPLE;  Surgeon: Mello Ferrer MD;  [facility]     ESOPHAGOSCOPY, GASTROSCOPY, DUODENOSCOPY (EGD), COMBINED N/A 10/2/2014    Procedure: COMBINED ESOPHAGOSCOPY, GASTROSCOPY, DUODENOSCOPY (EGD), BIOPSY SINGLE OR MULTIPLE;  Surgeon: Remy aHskins MD;  [facility]     ESOPHAGOSCOPY, GASTROSCOPY, DUODENOSCOPY (EGD), COMBINED Left 12/15/2014    Procedure: COMBINED ESOPHAGOSCOPY, GASTROSCOPY, DUODENOSCOPY (EGD), BIOPSY SINGLE OR MULTIPLE;  Surgeon: Remy Haskins MD;  [facility]     ESOPHAGOSCOPY, GASTROSCOPY, DUODENOSCOPY (EGD), COMBINED N/A 2/25/2015    Procedure: COMBINED ENDOSCOPIC ULTRASOUND, ESOPHAGOSCOPY, GASTROSCOPY, DUODENOSCOPY (EGD), FINE NEEDLE ASPIRATE/BIOPSY;  Surgeon: Clemente Lugo MD;  [facility]     ESOPHAGOSCOPY, GASTROSCOPY, DUODENOSCOPY (EGD), COMBINED Left 2/25/2015    Procedure: COMBINED ESOPHAGOSCOPY, GASTROSCOPY, DUODENOSCOPY (EGD), BIOPSY SINGLE OR MULTIPLE;  Surgeon: Clemente Lugo MD;  [facility]     ESOPHAGOSCOPY, GASTROSCOPY, DUODENOSCOPY (EGD), COMBINED N/A 9/25/2016    Procedure: COMBINED ESOPHAGOSCOPY, GASTROSCOPY, DUODENOSCOPY (EGD);  Surgeon: Aziza Patiño,  MD;  [facility]     ESOPHAGOSCOPY, GASTROSCOPY, DUODENOSCOPY (EGD), COMBINED N/A 1/18/2017    Procedure: COMBINED ESOPHAGOSCOPY, GASTROSCOPY, DUODENOSCOPY (EGD), BIOPSY SINGLE OR MULTIPLE;  Surgeon: Clemente Lopez MD;  [facility]     ESOPHAGOSCOPY, GASTROSCOPY, DUODENOSCOPY (EGD), COMBINED N/A 11/26/2017    Procedure: COMBINED ESOPHAGOSCOPY, GASTROSCOPY, DUODENOSCOPY (EGD), REMOVE FOREIGN BODY;  Esophagogastroduodenoscopy with foreign body extraction  ;  Surgeon: Herberth Castrejon MD;  [facility]     ESOPHAGOSCOPY, GASTROSCOPY, DUODENOSCOPY (EGD), COMBINED N/A 11/26/2017    Procedure: COMBINED ESOPHAGOSCOPY, GASTROSCOPY, DUODENOSCOPY (EGD), REMOVE FOREIGN BODY;;  Surgeon: Herberth Castrejon MD;  [facility]     ESOPHAGOSCOPY, GASTROSCOPY, DUODENOSCOPY (EGD), COMBINED N/A 4/10/2020    Procedure: ESOPHAGOGASTRODUODENOSCOPY (EGD);  Surgeon: Yael Cabral MD;  [facility]     FASCIOTOMY LOWER EXTREMITY Left 6/10/2016    Procedure: FASCIOTOMY LOWER EXTREMITY;  Surgeon: Mello Rodriguez MD;  [facility]     HC CAPSULE ENDOSCOPY N/A 8/25/2014    Procedure: CAPSULE/PILL CAM ENDOSCOPY;  Surgeon: Remy Haskins MD;  [facility]     HC CAPSULE ENDOSCOPY N/A 10/2/2014    Procedure: CAPSULE/PILL CAM ENDOSCOPY;  Surgeon: Remy Haskins MD;  [facility]     INJECT BLOCK MEDIAL BRANCH CERVICAL/THORACIC/LUMBAR Left 10/30/2020    Procedure: Cervical 3, 4, and 5 Medial Branch Block;  Surgeon: Evelyn Lima MD;  Location: [facility] OR     ORTHOPEDIC SURGERY      broken wrist repair     REVISE CATHETER INTRATHECAL  08/24/2020     SEX TRANSFORMATION SURGERY, MALE TO FEMALE  1974 1974     SINUS SURGERY      cyst removed     TONSILLECTOMY       VASCULAR SURGERY  2006    Left carotid stent     ACETAMINOPHEN EXTRA STRENGTH 500 MG tablet  ADVAIR DISKUS 250-50 MCG/DOSE inhaler  albuterol (PROAIR HFA/PROVENTIL HFA/VENTOLIN HFA) 108 (90 Base) MCG/ACT inhaler  albuterol (PROVENTIL)  (2.5 MG/3ML) 0.083% neb solution  alendronate (FOSAMAX) 70 MG tablet  ASPERCREME LIDOCAINE 4 % Patch  ASPIRIN LOW DOSE 81 MG chewable tablet  atorvastatin (LIPITOR) 40 MG tablet  blood glucose monitoring (ONE TOUCH ULTRA 2) meter device kit  blood glucose monitoring (ONE TOUCH ULTRA) test strip  blood glucose monitoring (ONE TOUCH ULTRASOFT) lancets  brimonidine (ALPHAGAN) 0.2 % ophthalmic solution  capsaicin-menthol-methyl sal 0.025-1-12 % external cream  diclofenac (VOLTAREN) 1 % topical gel  dorzolamide (TRUSOPT) 2 % ophthalmic solution  EPINEPHrine (ANY BX GENERIC EQUIV) 0.3 MG/0.3ML injection 2-pack  escitalopram (LEXAPRO) 10 MG tablet  FEROSUL 325 (65 Fe) MG tablet  fluticasone (FLONASE) 50 MCG/ACT nasal spray  fluticasone-salmeterol (ADVAIR) 500-50 MCG/DOSE inhaler  gabapentin (NEURONTIN) 100 MG capsule  INCRUSE ELLIPTA 62.5 MCG/INH Inhaler  lactulose (CHRONULAC) 10 GM/15ML solution  latanoprost (XALATAN) 0.005 % ophthalmic solution  levETIRAcetam (KEPPRA) 500 MG tablet  lisinopril (ZESTRIL) 20 MG tablet  loratadine (CLARITIN) 10 MG tablet  Medication given by intrathecal pump  Menthol, Topical Analgesic, 5 % GEL  metFORMIN (GLUCOPHAGE) 500 MG tablet  metoprolol succinate ER (TOPROL-XL) 50 MG 24 hr tablet  Multiple Vitamins-Minerals (TAB-A-MACY) TABS  multivitamin, therapeutic (THERA-VIT) TABS tablet  mupirocin (BACTROBAN) 2 % external ointment  nicotine (NICODERM CQ) 14 MG/24HR 24 hr patch  nitroGLYcerin (NITROSTAT) 0.4 MG sublingual tablet  Nutritional Supplements (ENSURE) LIQD  oxyCODONE (OXY-IR) 5 MG capsule  pantoprazole (PROTONIX) 40 MG EC tablet  phenytoin (DILANTIN) 100 MG capsule  polyethylene glycol (MIRALAX) 17 GM/SCOOP powder  pregabalin (LYRICA) 150 MG capsule  risperiDONE (RISPERDAL) 0.5 MG tablet  SENEXON-S 8.6-50 MG tablet  solifenacin (VESICARE) 10 MG tablet  spacer (OPTICHAMBER SANDRA) holding chamber  traZODone (DESYREL) 150 MG tablet  vitamin D3 (CHOLECALCIFEROL) 10 MCG (400 UNIT)  capsule  Vitamin D3 (VITAMIN D) 10 MCG (400 UNIT) tablet      Allergies   Allergen Reactions     Bee Venom Anaphylaxis     Penicillins Anaphylaxis     Dilantin [Phenytoin] Other (See Comments)     Generic dilantin only per pt     Iodine Hives     Novocaine [Procaine] Hives     Tositumomab Unknown     Family History  Family History   Problem Relation Age of Onset     Dementia Mother      Diabetes Mother         type unknown     Coronary Artery Disease Mother         MI     Glaucoma Father      Diabetes Father         type unknown     Heart Failure Father      Schizophrenia Brother      Depression Brother      Suicide Sister      Depression Sister      Diabetes Sister      Ovarian Cancer Maternal Aunt      Leukemia Maternal Aunt      Diabetes Maternal Grandmother      Diabetes Maternal Grandfather      Diabetes Paternal Grandmother      Diabetes Paternal Grandfather      Breast Cancer Sister      Cerebrovascular Disease Brother      Colon Cancer No family hx of      Macular Degeneration No family hx of      Social History   Social History     Tobacco Use     Smoking status: Current Every Day Smoker     Packs/day: 1.00     Years: 30.00     Pack years: 30.00     Types: Cigarettes, Cigars     Smokeless tobacco: Never Used     Tobacco comment: 1.5 packs per day    Substance Use Topics     Alcohol use: No     Alcohol/week: 0.0 standard drinks     Drug use: No      Past medical history, past surgical history, medications, allergies, family history, and social history were reviewed with the patient. No additional pertinent items.       Review of Systems   Gastrointestinal: Positive for abdominal pain.   Genitourinary: Positive for dysuria, flank pain (bilateral) and hematuria.   Musculoskeletal: Positive for back pain.        Pos for sacral pain   All other systems reviewed and are negative.    A complete review of systems was performed with pertinent positives and negatives noted in the HPI, and all other systems  "negative.    Physical Exam   BP: 96/54  Pulse: 61  Temp: 98.3  F (36.8  C)  Resp: 20  Height: 109.2 cm (3' 7\")  Weight: 56.7 kg (125 lb)  SpO2: 95 %  Physical Exam  Constitutional:       General: She is not in acute distress.     Appearance: She is not diaphoretic.   HENT:      Head: Atraumatic.      Mouth/Throat:      Pharynx: No oropharyngeal exudate.   Eyes:      General: No scleral icterus.     Pupils: Pupils are equal, round, and reactive to light.   Cardiovascular:      Heart sounds: Normal heart sounds.   Pulmonary:      Effort: No respiratory distress.      Breath sounds: Normal breath sounds.   Abdominal:      General: Bowel sounds are normal.      Palpations: Abdomen is soft.      Tenderness: There is no abdominal tenderness. There is no right CVA tenderness or left CVA tenderness.      Comments: Suprapubic catheter in place   Musculoskeletal:         General: No tenderness.      Comments: Bilateral AKA   Skin:     General: Skin is warm.      Findings: No rash.         ED Course     5:04 PM  The patient was seen and examined by Evan Blair DO in Room ED11.   Procedures               Results for orders placed or performed during the hospital encounter of 04/16/21   CBC with platelets differential     Status: None   Result Value Ref Range    WBC 9.2 4.0 - 11.0 10e9/L    RBC Count 3.81 3.8 - 5.2 10e12/L    Hemoglobin 11.7 11.7 - 15.7 g/dL    Hematocrit 35.0 35.0 - 47.0 %    MCV 92 78 - 100 fl    MCH 30.7 26.5 - 33.0 pg    MCHC 33.4 31.5 - 36.5 g/dL    RDW 13.3 10.0 - 15.0 %    Platelet Count 257 150 - 450 10e9/L    Diff Method Automated Method     % Neutrophils 61.0 %    % Lymphocytes 23.9 %    % Monocytes 10.7 %    % Eosinophils 3.6 %    % Basophils 0.5 %    % Immature Granulocytes 0.3 %    Nucleated RBCs 0 0 /100    Absolute Neutrophil 5.6 1.6 - 8.3 10e9/L    Absolute Lymphocytes 2.2 0.8 - 5.3 10e9/L    Absolute Monocytes 1.0 0.0 - 1.3 10e9/L    Absolute Eosinophils 0.3 0.0 - 0.7 10e9/L    Absolute " Basophils 0.1 0.0 - 0.2 10e9/L    Abs Immature Granulocytes 0.0 0 - 0.4 10e9/L    Absolute Nucleated RBC 0.0    Basic metabolic panel     Status: None   Result Value Ref Range    Sodium 138 133 - 144 mmol/L    Potassium 3.9 3.4 - 5.3 mmol/L    Chloride 105 94 - 109 mmol/L    Carbon Dioxide 28 20 - 32 mmol/L    Anion Gap 4 3 - 14 mmol/L    Glucose 79 70 - 99 mg/dL    Urea Nitrogen 12 7 - 30 mg/dL    Creatinine 0.58 0.52 - 1.04 mg/dL    GFR Estimate >90 >60 mL/min/[1.73_m2]    GFR Estimate If Black >90 >60 mL/min/[1.73_m2]    Calcium 8.9 8.5 - 10.1 mg/dL   UA reflex to Microscopic and Culture     Status: Abnormal    Specimen: Urine clean catch; Midstream Urine   Result Value Ref Range    Color Urine Light Yellow     Appearance Urine Slightly Cloudy     Glucose Urine Negative NEG^Negative mg/dL    Bilirubin Urine Negative NEG^Negative    Ketones Urine Negative NEG^Negative mg/dL    Specific Gravity Urine 1.005 1.003 - 1.035    Blood Urine Small (A) NEG^Negative    pH Urine 6.5 5.0 - 7.0 pH    Protein Albumin Urine Negative NEG^Negative mg/dL    Urobilinogen mg/dL Normal 0.0 - 2.0 mg/dL    Nitrite Urine Positive (A) NEG^Negative    Leukocyte Esterase Urine Large (A) NEG^Negative    Source Midstream Urine     RBC Urine 4 (H) 0 - 2 /HPF    WBC Urine 18 (H) 0 - 5 /HPF    Bacteria Urine Moderate (A) NEG^Negative /HPF    Squamous Epithelial /HPF Urine 0 0 - 1 /HPF   Urine Culture Aerobic Bacterial     Status: None    Specimen: Midstream Urine   Result Value Ref Range    Specimen Description Midstream Urine     Special Requests Specimen received in preservative     Culture Micro >100,000 colonies/mL  mixed urogenital hannah       Culture Micro       Multiple morphotypes present with no predominant organism.  Growth consistent with   probable contamination during collection.  Suggest repeat specimen if clinically   indicated.       Medications   acetaminophen (TYLENOL) tablet 1,000 mg (1,000 mg Oral Given 4/16/21 1720)    nitroFURantoin macrocrystal-monohydrate (MACROBID) capsule 100 mg (100 mg Oral Given 4/16/21 1919)   oxyCODONE-acetaminophen (PERCOCET) 5-325 MG per tablet 1 tablet (1 tablet Oral Given 4/16/21 1917)        Assessments & Plan (with Medical Decision Making)     70 yo f here w/ report of UTI, previously sensitive to macrobid so will write for that, does not appear septic, no flank tenderness (suspect report of flank pain is chronic msk pain), urine cx will be followed up.    I have reviewed the nursing notes. I have reviewed the findings, diagnosis, plan and need for follow up with the patient.    Discharge Medication List as of 4/16/2021  7:45 PM          Final diagnoses:   Urinary tract infection associated with indwelling urethral catheter, initial encounter (H)   IJennifer, am serving as a trained medical scribe to document services personally performed by Evan Blair DO, based on the provider's statements to me.     IEvan DO, was physically present and have reviewed and verified the accuracy of this note documented by Jennifer Hilliard.     --  Evan Blair DO  [facility] EMERGENCY DEPARTMENT  4/16/2021     Evan Blair MD  04/28/21 2126

## 2021-04-16 NOTE — ED TRIAGE NOTES
[facility] with c/o of possible UTI. Reports abdominal/ back pain for greater than 4 days. Staff reports blood in urine. Pt has super pubic catheter.

## 2021-04-16 NOTE — ED TRIAGE NOTES
BIBA with known UTI that has not been treated.  Since Rx has not started, Pt wanted to be brought to the E.D. for treatment.

## 2021-04-17 LAB
BACTERIA SPEC CULT: NORMAL
BACTERIA SPEC CULT: NORMAL
Lab: NORMAL
SPECIMEN SOURCE: NORMAL

## 2021-04-18 LAB — BACTERIA SPEC CULT: ABNORMAL

## 2021-04-23 ENCOUNTER — VIRTUAL VISIT (OUTPATIENT)
Dept: NUTRITION | Facility: CLINIC | Age: 71
End: 2021-04-23
Attending: INTERNAL MEDICINE
Payer: COMMERCIAL

## 2021-04-23 DIAGNOSIS — E11.59 TYPE 2 DIABETES MELLITUS WITH OTHER CIRCULATORY COMPLICATION, WITHOUT LONG-TERM CURRENT USE OF INSULIN (H): ICD-10-CM

## 2021-04-23 PROCEDURE — 99207 PR NO CHARGE LOS: CPT

## 2021-04-23 NOTE — PROGRESS NOTES
Was able to reach Sonya at home but she declines wanting to talk today since she is not feeling well - struggling with a UTI. Offered to help her reschedule this visit but declines and so was encouraged to call back to schedule once she is feeling better.    Jaylyn Waller,  SANGITAN, LD, [facility]

## 2021-04-27 ENCOUNTER — TRANSFERRED RECORDS (OUTPATIENT)
Dept: HEALTH INFORMATION MANAGEMENT | Facility: CLINIC | Age: 71
End: 2021-04-27

## 2021-04-27 ENCOUNTER — OFFICE VISIT (OUTPATIENT)
Dept: URGENT CARE | Facility: URGENT CARE | Age: 71
End: 2021-04-27
Payer: COMMERCIAL

## 2021-04-27 VITALS
DIASTOLIC BLOOD PRESSURE: 56 MMHG | TEMPERATURE: 98.2 F | OXYGEN SATURATION: 94 % | RESPIRATION RATE: 18 BRPM | HEART RATE: 59 BPM | SYSTOLIC BLOOD PRESSURE: 76 MMHG

## 2021-04-27 DIAGNOSIS — L03.319 CELLULITIS OF TRUNK, UNSPECIFIED SITE OF TRUNK: ICD-10-CM

## 2021-04-27 DIAGNOSIS — J44.1 COPD EXACERBATION (H): ICD-10-CM

## 2021-04-27 DIAGNOSIS — R06.02 SOB (SHORTNESS OF BREATH): ICD-10-CM

## 2021-04-27 DIAGNOSIS — I95.9 HYPOTENSION, UNSPECIFIED HYPOTENSION TYPE: Primary | ICD-10-CM

## 2021-04-27 DIAGNOSIS — R07.9 ACUTE CHEST PAIN: ICD-10-CM

## 2021-04-27 PROBLEM — N31.9 NEUROGENIC BLADDER: Chronic | Status: ACTIVE | Noted: 2020-05-21

## 2021-04-27 PROBLEM — E66.01 MORBID OBESITY (H): Chronic | Status: ACTIVE | Noted: 2020-05-14

## 2021-04-27 PROBLEM — Z89.611 S/P AKA (ABOVE KNEE AMPUTATION) BILATERAL (H): Status: ACTIVE | Noted: 2019-06-19

## 2021-04-27 PROBLEM — E11.9 TYPE 2 DIABETES MELLITUS (H): Chronic | Status: ACTIVE | Noted: 2021-04-27

## 2021-04-27 PROBLEM — Z89.612 S/P AKA (ABOVE KNEE AMPUTATION) BILATERAL (H): Status: ACTIVE | Noted: 2019-06-19

## 2021-04-27 LAB
CREAT SERPL-MCNC: 0.6 MG/DL (ref 0.6–1.1)
GFR SERPL CREATININE-BSD FRML MDRD: >60 ML/MIN/1.73M2
GLUCOSE SERPL-MCNC: 89 MG/DL (ref 70–125)
POTASSIUM SERPL-SCNC: 3.9 MMOL/L (ref 3.5–5)

## 2021-04-27 PROCEDURE — 93000 ELECTROCARDIOGRAM COMPLETE: CPT | Performed by: FAMILY MEDICINE

## 2021-04-27 PROCEDURE — 99214 OFFICE O/P EST MOD 30 MIN: CPT | Mod: 25 | Performed by: FAMILY MEDICINE

## 2021-04-27 NOTE — PROGRESS NOTES
SUBJECTIVE: Sonya Foote is a 71 year old female presenting with a chief complaint of CP/SOB and sore bottom.  Onset of symptoms was day(s) ago.  Course of illness is worsening.    Severity moderate  Current and Associated symptoms: none    Past Medical History:   Diagnosis Date     Acid reflux disease      Benign essential hypertension      Blind left eye     due to central retinal artery occlusion     CAD (coronary artery disease)     UA and inferior MI in 2016 s/p PCI     Chest pain on breathing 06/10/2020     Chronic back pain      Chronic neck pain      Chronic pain syndrome     with fentanyl intrathecal pain pump     Complex sleep apnea syndrome      COPD (chronic obstructive pulmonary disease) (H)      Dysphagia     chronic due to esophageal strictures and multiple previous dilitations      ROGER (generalized anxiety disorder)      History of blood transfusion     x5; no adverse reactions     History of central retinal artery occlusion     left-sided 2006     History of stroke     residual left-sided weakness     Hx of carotid artery stenosis     s/p left carotid stent in 2006 and balloon angioplasty in 2016     MDD (major depressive disorder)      Neurogenic bladder     SPT in place     JOSE (obstructive sleep apnea)      Osteoporosis      PAD (peripheral artery disease) (H)      Peripheral neuropathy      Person who has had sex change operation     male to female     Seizure disorder (H)     many years since last grand mal; daily, brief petit mals     Sickle cell trait (H)      Type 2 diabetes mellitus (H)      Allergies   Allergen Reactions     Bee Venom Anaphylaxis     Penicillins Anaphylaxis     Dilantin [Phenytoin] Other (See Comments)     Generic dilantin only per pt     Iodine Hives     Novocaine [Procaine] Hives     Tositumomab Unknown     Social History     Tobacco Use     Smoking status: Current Every Day Smoker     Packs/day: 1.00     Years: 30.00     Pack years: 30.00     Types: Cigarettes, Cigars      Smokeless tobacco: Never Used     Tobacco comment: 1.5 packs per day    Substance Use Topics     Alcohol use: No     Alcohol/week: 0.0 standard drinks       ROS:  SKIN: no rash  GI: no vomiting    OBJECTIVE:  BP (!) 76/56   Pulse 59   Temp 98.2  F (36.8  C) (Tympanic)   Resp 18   SpO2 94% GENERAL APPEARANCE: healthy, alert and no distress  EYES: EOMI,  PERRL, conjunctiva clear  RESP: lungs clear to auscultation - no rales, rhonchi or wheezes  CV: regular rates and rhythm, normal S1 S2, no murmur noted  SKIN: red tender area base of tailbone      ICD-10-CM    1. Hypotension, unspecified hypotension type  I95.9    2. Acute chest pain  R07.9 EKG 12-lead complete w/read - Clinics   3. SOB (shortness of breath)  R06.02    4. COPD exacerbation (H)  J44.1    5. Cellulitis of trunk, unspecified site of trunk  L03.319      Recheck BP still 70's systolic  Pt will need to go through ED for cont w/u of hypotension and above

## 2021-04-28 ENCOUNTER — VIRTUAL VISIT (OUTPATIENT)
Dept: SLEEP MEDICINE | Facility: CLINIC | Age: 71
End: 2021-04-28
Payer: COMMERCIAL

## 2021-04-28 DIAGNOSIS — G47.33 OSA (OBSTRUCTIVE SLEEP APNEA): ICD-10-CM

## 2021-04-28 DIAGNOSIS — F51.04 CHRONIC INSOMNIA: ICD-10-CM

## 2021-04-28 DIAGNOSIS — G47.39 COMPLEX SLEEP APNEA SYNDROME: ICD-10-CM

## 2021-04-28 PROCEDURE — 99215 OFFICE O/P EST HI 40 MIN: CPT | Mod: 95 | Performed by: INTERNAL MEDICINE

## 2021-04-28 NOTE — PROGRESS NOTES
"Sonya Foote is a 71 year old female being evaluated via a billable telephone visit.     \"This telephone visit will be conducted via a call between you and your physician/provider. We have found that certain health care needs can be provided without the need for an in-person visit or physical exam.  This service lets us provide the care you need with a telephone conversation.  If a prescription is necessary we can send it directly to your pharmacy.  If lab work is needed we can place an order for that and you can then stop by our lab to have the test done at a later time.\"    Telephone visits are billed at different rates depending on your insurance coverage.  Please reach out to your insurance provider with any questions.    Patient has given verbal consent for  a Telephone visit? Yes    What telephone number would you like your provider to contact at at: [phone]     How would you like to obtain your AVS? Mail a copy    Ghazala Alcantar MA    Telephone Visit Details:     Telephone Visit Start Time: 9:58 AM    Telephone Visit End Time:10:19 AM          Sleep Study Follow-Up Visit:    Date on this visit: 4/28/2021    Sonya Foote for follow-up of her sleep study done at the [facility].    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 Did have Restless Legs Syndrome before amputation but not anymore.       Sleep history:   10/26/2012 - Polysomnography at [facility] -  min; AHI 14; RDI 22; ERIN 5/hr; No PLMs or RBD.     11/16/2012 - Titration Polysomnography at [facility] - Started on CPAP and developed treatment-emergent central apneas. Adaptive Servo-Ventilation titration optimal but did well on CPAP 8 as well (at least REM supine per comments). "     Put on CPAP 8 cmH2O.     Initially presented to [facility] 2017 on CPAP 8 cmH20 and elevated AHI 9.7 on download. Not a candidate for ASV due to reduced ejection fraction.     2/26/2017 - Titration Polysomnography at the [facility] (130#)  minutes. CPAP 5 - 12 cmH2O tried. Unacceptable titration due to high residual AHI, however, oxygen desaturations and events were better controlled on CPAP of 10 cmH2O or higher. However AHI was >20 and predominantly obstructive events were scored.     She was changed to CPAP 12 cmH2O.     She received a new machine 7/2019 and lost it due to non-compliance     Echo 11/2019   Global and regional left ventricular function is normal with an EF of 60-65%.   Pulmonary artery systolic pressure is normal.     Study Date: 11/20/2019 (130.0 lbs) Titrated CPAP 5 to 12 cmH2O. Supine REM was seen at 11 cmH20 with fair control of events, but persistent RERAs, snoring, and airflow limitation. Events often appeared periodic, and cheyyne-fang morphology was suggested at times, but periodicity was not well consolidated and circulation times were not delayed.     Recommend treatment of JOSE with Autotitrating PAP therapy with a range of 12 cmH2O to 15 cmH2O     She lost her machine due to non-compliance     She has had difficulty with compliance in part due to short sleep times as well as insomnia. Usual sleep time is short (5-6 hours) under best of circumstances related to her chronic pain. Her insomnia appears to be multifactorial with components of psychophysiologic insomnia, pain, and disrupted circadian rhythm as well as poor sleep hygiene all playing a role. She saw Dr. Campos once in 10/2020.     Due to Covid 19 pandemic she was unable to repeat a titration study until 3/31/21    Study Date: 3/31/2021 (125.0 lbs)  Sleep Architecture:   The total recording time of the polysomnogram was 551.4 minutes. The total sleep time was 518.5 minutes.  Sleep latency was decreased at 1.5 minutes without the use of a sleep aid. REM latency was 57.5 minutes. Arousal index was increased at 39.3 arousals per hour. Sleep efficiency was normal at 94.0%. Wake after sleep onset was 31.0 minutes. The patient spent 4.1% of total sleep time in Stage N1, 53.2% in Stage N2, 23.6% in Stage N3, and 19.0% in REM.      Respiration: Patient slept supine with 4 pillows    The patient was titrated at pressures ranging from 10/7 cmH2O up to [place] 11/7/0 cmH2O. Due to emergent central events she was switched to ASV. The final pressure achieved was ASV 10/0/20 cmH2O with a residual AHI of 65.1 events per hour. Events were predominantly central apneas and hypopneas with intermittent/poorly consolidated periodic pattern.     This titration was considered complex, unacceptable    Sustained Sleep Associated Hypoventilation - Transcutaneous carbon dioxide monitoring was used, however no clear baseline was achieved prior to onset. Baseline after sleep onset was 50mmHg.  Highest TCM was 57.1 mmHg and 113.7 minutes was spent at or greater than 55 mmHg.    Sleep Associated Hypoxemia - (Greater than 5 minutes O2 sat at or below 88%) was not present. Baseline oxygen saturation was 95.3%. Lowest oxygen saturation was 86.6%. Time spent less than or equal to 88% was 0.4 minutes. Time spent less than or equal to 89% was 1.8 minutes.  Movement Activity:     Periodic Limb Activity - There were 19 PLMs during the entire study. The PLM index was 2.2 movements per hour. The PLM Arousal Index was 0.5 per hour.    REM EMG Activity - Excessive transient/sustained muscle activity was not present.    Nocturnal Behavior - Abnormal sleep related behaviors were not noted     Bruxism - None apparent.  Cardiac Summary:   The average pulse rate was 61.3 bpm. The minimum pulse rate was 53.0 bpm while the maximum pulse rate was 97.3 bpm. Arrhythmias were not noted.     These findings were reviewed with patient.      She reports she is having problems with a herniated disc.     SCALES:  EPWORTH SLEEPINESS SCALE    Sitting and reading 0   Watching TV 3   Sitting, inactive in a public place (theatre or mtg.) 2    As a passenger in a car 1   Lying down to rest in the afternoon when circumstance permit 3   Sitting and talking to someone 0   Sitting quietly after lunch without alcohol 2   In a car, while stopped for a few minutes in traffic 0   TOTAL SCORE 11       She remains as ever a vague and tangential historian.   Current bedtime is 8-9 PM and watches TV until she falls asleep. Usually it takes 30 minutes to fall asleep. She gets her Meds at 5 am and gets up and 7-8 am when her Blood sugars get checked. She sometimes stays in bed until 11 AM. She says she is  usyuallly asleep between 10 pm- 6 am. She says her sleep is not terribly disruptive during this time. 2-3 times a week she is in pain, so she has to get up and take some medications, she will sometimes go have a cigaette.   She naps at 2PM until 5 PM.     Recent Labs   Lab Test 04/16/21  1721 12/26/20  0017    139   POTASSIUM 3.9 3.9   CHLORIDE 105 107   CO2 28 29   ANIONGAP 4 3   GLC 79 96   BUN 12 11   CR 0.58 0.57   CAROL 8.9 8.6       Past medical/surgical history, family history, social history, medications and allergies were reviewed.      Problem List:  Patient Active Problem List    Diagnosis Date Noted     History of stroke      Priority: High     residual left-sided weakness       CAD (coronary artery disease)      Priority: High     UA and inferior MI in 2016 s/p PCI       S/P AKA (above knee amputation) bilateral (H) 06/19/2019     Priority: High     s/p left above-the-knee amputation for peripheral vascular disease and thrombosis on 06/06/2016 and a right above-the-knee amputation for peripheral arterial disease on 11/23/2016.        Chronic pain syndrome 03/20/2009     Priority: High     Has a fentanyl PUMP    Patient is followed by Allan Casey  for ongoing prescription of pain meds  All refills should be approved by this provider, or covering partner.    Maximum quantity per month: 1 month  Clinic visit frequency required: Q 6  months     Controlled substance agreement:  Encounter-Level CSA:     There are no encounter-level csa.        Pain Clinic evaluation in the past: No    DIRE Total Score(s):  No flowsheet data found.    Last [facility] website verification:  6/6/18   [email or web address]       Type 2 diabetes mellitus (H) 04/27/2021     Priority: Medium     Pressure injury of right lower back, stage 2 (H) 01/12/2021     Priority: Medium     Morbid obesity (H) 05/14/2020     Priority: Medium     'COPD' (chronic obstructive pulmonary disease) (H)      Priority: Medium     PFTS 9/2020 - FEV1- 2.46 (109%), ratio 0.84, DLCOunc 63% not suggestive of COPD       Benign essential hypertension      Priority: Medium     Sickle cell trait (H)      Priority: Medium     MDD (major depressive disorder)      Priority: Medium     Seizure disorder (H)      Priority: Medium     many years since last grand mal; daily, brief petit mals       ROGER (generalized anxiety disorder)      Priority: Medium     Person who has had sex change operation      Priority: Medium     male to female       PAD (peripheral artery disease) (H)      Priority: Medium     JOSE (obstructive sleep apnea)      Priority: Medium     Complex sleep apnea syndrome      Priority: Medium     Other cystostomy status (H) 11/04/2019     Priority: Medium     Urge incontinence of urine 02/24/2021     Priority: Low     Overactive bladder 02/24/2021     Priority: Low     Dizziness 12/26/2020     Priority: Low     Arthropathy of cervical facet joint 10/16/2020     Priority: Low     Chronic insomnia 09/01/2020     Priority: Low     Neurogenic bladder 05/21/2020     Priority: Low     History of blood transfusion      Priority: Low     x5; no adverse reactions       Type 2 diabetes mellitus with other circulatory  complication, without long-term current use of insulin (H)      Priority: Low     Chronic back pain      Priority: Low     Chronic neck pain      Priority: Low     Osteoporosis      Priority: Low     Peripheral neuropathy      Priority: Low     Blind left eye      Priority: Low     due to central retinal artery occlusion       Acid reflux disease      Priority: Low     Hx of carotid artery stenosis      Priority: Low     s/p left carotid stent in 2006       Dysphagia      Priority: Low     chronic due to esophageal strictures and multiple previous dilitations        History of central retinal artery occlusion 2006     Priority: Low     left-sided 2006          Impression/Plan:    Complex patient. Multiple comorbidities.    Complex sleep apnea   Mild Sleep obstructive sleep apnea by sleep study 2012.  History of complex apnea with 'treatment emergent central apneas' by titration study 2012. Treated with initially ASV, later PAP after EF worsened. Study 2/2017 suggested severe incompletely treated obstructive sleep apnea at CPAP 12 cmH20.  More recent study 11/2019 with acceptable titration, but probable underlying periodic breathing.       Current study 3/2021 with unacceptable titration. Patient appeared best at 10/7 cmH2o. Obstructive events were adequately treated at this pressure, however patient developed central apneas and hypopneas later on the study. Use of ASV was ineffective. It appeared that the ASV algorithm was inappropriately dropping pressure support and events re-occurred when pressure support was dropped. This may be related to machine malfunction or may possibly related to poor positioning with patient on 4 pillows with fairly extreme neck flexion. Possible chronic hypercarbia  Titration complicated by high leak, especially on ASV  - Check VBG  - Trial of treatment with Bilevel 10/7 cmh2O   - Consider repeat ASV titration with attention to proper positioning    Chronic insomnia   Suspect  Psychophysiologic insomnia, pain, poor sleep hygiene and disrupted circadian rhythm all playing a role. We continue to make similar recoomendations at every visit for which she forgets or is noncompliant.  - Recommend restricting time in bed to 8 hours 10 PM - 7 AM  - Avoid TV in bed   - No smoking at  night      She will follow up with me in about 2 month(s).     I spent 20 minutes with patient including counseling, and >25 minutes with chart review, and documentation

## 2021-05-05 ENCOUNTER — TRANSCRIBE ORDERS (OUTPATIENT)
Dept: OPHTHALMOLOGY | Facility: CLINIC | Age: 71
End: 2021-05-05

## 2021-05-05 DIAGNOSIS — H40.1133 PRIMARY OPEN ANGLE GLAUCOMA OF BOTH EYES, SEVERE STAGE: Primary | ICD-10-CM

## 2021-05-06 ENCOUNTER — MEDICAL CORRESPONDENCE (OUTPATIENT)
Dept: HEALTH INFORMATION MANAGEMENT | Facility: CLINIC | Age: 71
End: 2021-05-06

## 2021-05-06 NOTE — PROGRESS NOTES
02/04/21 1300   Signing Clinician's Name / Credentials   Signing clinician's name / credentials Estefania Renteria PT   Session Number   Session Number 4   medica   Goal 1   Goal Identifier transfers   Goal Description safe tx w/ use of bilat prostheses indep   Target Date 04/19/21   Goal 2   Goal Identifier standing   Goal Description pt to be able to stand at walker or kitchen counter indep x 2 min w/ prostheses on.    Target Date 04/19/21   Goal 3   Goal Identifier gait   Goal Description pt to walk 20ft w/ bilat prostheses, walker and min assist   Target Date 04/19/21   Goal 4   Goal Identifier HEP   Goal Description pt to be indep w / a HEP for overall wellness.   Target Date 04/19/21   Date Met 02/04/21   Subjective Report   Subjective Report i was sick.   i stand near my bed.  i try to use an aide.  i feel when i tried to walk.  pressed button.  had help to get up.  had issue w/ R shoulder.  did not wear legs for 2 wk.  still sore.  just put legs on again today since 2 wk.     Vitals Signs   Heart Rate 69   Blood Pressure 121/58   Therapeutic Activity   Therapeutic Activities: dynamic activities to improve functional performance minutes (43065) 35   Skilled Intervention education; standing in //bars.    Patient Response sore in L thigh all over; called doc office.    Treatment Detail stood 3x in //bars x 30 to 60 sec.  min assist. cues.  pt felt L thigh pain w/ prosthetic on- when removed, still painful.  mild redness when removed prosthesis; went away in 3 min.  pt points to whole L thigh as sore.  fell 2 wk ago and has pain since then.  called pt doctor office w/ her; reported pain- asked for doc to call re; either seeing pt or may need xray.      Progress educ pt to NOT stand w/out aids there.  she agrees.    Education   Learner Patient   Readiness Acceptance   Method Explanation;Booklet/handout   Response Verbalizes Understanding   Education Comments call doc, don't stand w/ out aid   Plan   Homework above    Home program stand w/ aid only.     Plan for next session re assess pain, standing, goals.     Comments   Comments late entry - pt cx or no show for 2 appt. states insurance changed/ wants to cx.  see above for 2/4/21 status and goal status.  will D/C now.     Total Session Time   Timed Code Treatment Minutes 35   Total Treatment Time (sum of timed and untimed services) 35

## 2021-05-07 ENCOUNTER — RECORDS - HEALTHEAST (OUTPATIENT)
Dept: LAB | Facility: CLINIC | Age: 71
End: 2021-05-07

## 2021-05-10 ENCOUNTER — OFFICE VISIT (OUTPATIENT)
Dept: OPHTHALMOLOGY | Facility: CLINIC | Age: 71
End: 2021-05-10
Attending: OPHTHALMOLOGY
Payer: COMMERCIAL

## 2021-05-10 ENCOUNTER — DOCUMENTATION ONLY (OUTPATIENT)
Dept: SLEEP MEDICINE | Facility: CLINIC | Age: 71
End: 2021-05-10

## 2021-05-10 DIAGNOSIS — G47.33 OSA (OBSTRUCTIVE SLEEP APNEA): ICD-10-CM

## 2021-05-10 DIAGNOSIS — F51.04 CHRONIC INSOMNIA: ICD-10-CM

## 2021-05-10 DIAGNOSIS — G47.39 COMPLEX SLEEP APNEA SYNDROME: ICD-10-CM

## 2021-05-10 DIAGNOSIS — H40.1133 PRIMARY OPEN ANGLE GLAUCOMA OF BOTH EYES, SEVERE STAGE: Primary | ICD-10-CM

## 2021-05-10 PROCEDURE — 92133 CPTRZD OPH DX IMG PST SGM ON: CPT | Performed by: OPHTHALMOLOGY

## 2021-05-10 PROCEDURE — G0463 HOSPITAL OUTPT CLINIC VISIT: HCPCS | Mod: 25

## 2021-05-10 PROCEDURE — 92015 DETERMINE REFRACTIVE STATE: CPT

## 2021-05-10 PROCEDURE — 92134 CPTRZ OPH DX IMG PST SGM RTA: CPT | Performed by: OPHTHALMOLOGY

## 2021-05-10 PROCEDURE — 99207 OCT RETINA SPECTRALIS OD (RIGHT EYE): CPT | Mod: 26 | Performed by: OPHTHALMOLOGY

## 2021-05-10 PROCEDURE — 92012 INTRM OPH EXAM EST PATIENT: CPT | Performed by: OPHTHALMOLOGY

## 2021-05-10 ASSESSMENT — CUP TO DISC RATIO
OD_RATIO: 0.8
OS_RATIO: 0.9

## 2021-05-10 ASSESSMENT — CONF VISUAL FIELD
OS_INFERIOR_TEMPORAL_RESTRICTION: 1
OS_INFERIOR_NASAL_RESTRICTION: 1
OD_INFERIOR_NASAL_RESTRICTION: 1
OS_SUPERIOR_TEMPORAL_RESTRICTION: 1
OD_SUPERIOR_NASAL_RESTRICTION: 3
OS_SUPERIOR_NASAL_RESTRICTION: 1
OD_SUPERIOR_TEMPORAL_RESTRICTION: 1
OD_INFERIOR_TEMPORAL_RESTRICTION: 1

## 2021-05-10 ASSESSMENT — REFRACTION_WEARINGRX
SPECS_TYPE: TRIFOCAL
OD_SPHERE: -1.00
OS_ADD: +3.00
OS_SPHERE: -1.00
OS_CYLINDER: SPHERE
OD_ADD: +3.00
OD_CYLINDER: SPHERE

## 2021-05-10 ASSESSMENT — VISUAL ACUITY
METHOD: SNELLEN - LINEAR
OS_CC: NLP
OD_PH_CC+: -1
OD_CC: 20/200
OD_PH_CC: 20/125

## 2021-05-10 ASSESSMENT — TONOMETRY
IOP_METHOD: TONOPEN
OS_IOP_MMHG: 10
OD_IOP_MMHG: 12

## 2021-05-10 ASSESSMENT — EXTERNAL EXAM - LEFT EYE: OS_EXAM: NORMAL

## 2021-05-10 ASSESSMENT — SLIT LAMP EXAM - LIDS
COMMENTS: NORMAL
COMMENTS: NORMAL

## 2021-05-10 ASSESSMENT — REFRACTION_MANIFEST
OD_SPHERE: -1.50
OD_CYLINDER: SPHERE
OD_ADD: +3.00
OS_SPHERE: BALANCE

## 2021-05-10 ASSESSMENT — EXTERNAL EXAM - RIGHT EYE: OD_EXAM: NORMAL

## 2021-05-10 NOTE — NURSING NOTE
Chief Complaints and History of Present Illnesses   Patient presents with     Glaucoma Follow-Up     Chief Complaint(s) and History of Present Illness(es)     Glaucoma Follow-Up     Laterality: both eyes    Associated symptoms: eye pain    Compliance with Treatment: always    Pain scale: 8/10              Comments     Sonya is here to continue care for Primary open angle glaucoma of both eyes, severe stage. She feels vision RE is getting worse. LE is NLP  History of diabetes    Lab Results       Component                Value               Date                       A1C                      5.1                 12/26/2020                 A1C                      5.0                 08/19/2020                 A1C                      5.0                 12/16/2019                 BS was 93 this morning               Kaushik Snyder COT 8:46 AM May 10, 2021

## 2021-05-10 NOTE — PROGRESS NOTES
CC: follow up glaucoma   HPI: Sonya Foote is a  71 year old year-old patient with history of Primary open angle glaucoma (POAG), here for follow up.  Reports no changes in vision, no flashes and floaters     Retinal Imaging:  OCT ONFL   RE: ? Increased STthinning? But tracing not completely reliable  LE:stable     Macula Optical Coherence Tomography: 05/10/21 good foveal contour  ONFL temporal thinning     Assessment & Plan:    # POAG/?LTG vs Glc Suspect   -- s/p SLT OD (3/13/15),  -- H/O negative head CT and MRI 2013,2014 for headaches,   - ?retinal vascular event in the right eye in the past.    See below plan   -- K pachy: 539/540     Tmax: OD:23      HVF: OD:[place] on LVC in 2016 (unable to transfer out of chair)       CDR: 0.7/0.9 (pallor OS>OD)      HRT/OCT: Severe RNFL thinning OS>OD         FHX of Glc: Father, grandparents -- lost vision, was on gtts       Asthma/COPD: Yes, on inhalers but tolerating topical and po BB   Steroid Use: No    Kidney Stones: No    Sulfa Allergy: No   - Good intraocular pressure 12/10  Overall exam stable; will continue to monitor ONFL and possible increased thinning ST    # PCIOL OD   -- vision loss in the right eye predates 2013 (pt believes she lost all vision in 2005) and she had several MRI's in 2013 including one with orbital sequences which did not reveal any structural change to cause a right optic neuropathy.     # DM s Diabetic retinopathy  Blood pressure (<120/80) and blood glucose (HbA1c <7.0) control discussed with patient. Patient advised that failure to adequately control each may lead to vision loss. The patient expressed understanding.    # 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  -- ?acute central retinal / ophthalmic artery thromboembolic event   NS OS -- observe as VA is no light perception   # H/O CVA  -- She is a severe vasculopath based on past medical history of several central nervous system strokes, coronary artery disease with CHF, severe hypertension,  type II diabetes mellitus, and peripheral vascular disease.    #Dry eye syndrome   Recommend artificial tears  As needed     PLAN  Patient will continue on   - Cosopt (Timolol/Dorzolamide) which is a blue top drop 2x/day (12 hours apart) in the RIGHT EYE ONLY,  - Latanoprost which is a teal top drop at bedtime in both eyes, and   - Alphagan (Brimonidine) which is a purple top drop 2x/day (12 hours apart) in the RIGHT EYE ONLY.   - Patient will return to clinic in 6 months with evaluation, dilated eye exam, OCT with RNFL analysis and IOP check.  - recommend ashish with low vision specialist  - Legally blind paperwork (will fill out )    - follow up in 6 months; intraocular pressure check and nerve fiber layer right eye  sooner as needed;            ~~~~~~~~~~~~~~~~~~~~~~~~~~~~~~~~~~   Complete documentation of historical and exam elements from today's encounter can be found in the full encounter summary report (not reduplicated in this progress note).  I personally obtained the chief complaint(s) and history of present illness.  I confirmed and edited as necessary the review of systems, past medical/surgical history, family history, social history, and examination findings as documented by others; and I examined the patient myself.  I personally reviewed the relevant tests, images, and reports as documented above.  I formulated and edited as necessary the assessment and plan and discussed the findings and management plan with the patient and family    Mel Burnett MD   of Ophthalmology.  Retina Service   Department of Ophthalmology and Visual Neurosciences   [facility]  Phone: [phone]   Fax: [phone]

## 2021-05-13 ENCOUNTER — DOCUMENTATION ONLY (OUTPATIENT)
Dept: SLEEP MEDICINE | Facility: CLINIC | Age: 71
End: 2021-05-13
Payer: COMMERCIAL

## 2021-05-13 NOTE — PROGRESS NOTES
Patient was offered choice of vendor and chose [facility].  Patient Sonya Foote was set up at [employer] on May 13, 2021. Patient received a Resmed AirSense 10 Auto. Pressures were set at IPAP: 10  EPAP: 7 cm H2O.   Patient s ramp is 5 cm H2O for Auto and FLEX/EPR is EPR, 2.  Patient received a Resmed Mask name: [id number]  Nasal mask size For Her, heated tubing and heated humidifier.  Patient does need to meet compliance. Patient has a follow up on TBD with Dr. Wright.    Tye Orantes

## 2021-05-14 ENCOUNTER — TELEPHONE (OUTPATIENT)
Dept: OPHTHALMOLOGY | Facility: CLINIC | Age: 71
End: 2021-05-14

## 2021-05-14 NOTE — TELEPHONE ENCOUNTER
Last glasses Rx faxed    Allan Bryson RN 12:35 PM 05/14/21        [facility]    Phone Message    May a detailed message be left on voicemail: yes     Reason for Call: Other: Symone from [facility] called and asked that this pt's glasses RX be faxed to her. The fax # is f [phone] Thanks..     Action Taken: Message routed to:  [facility]    Travel Screening: Not Applicable

## 2021-05-14 NOTE — TELEPHONE ENCOUNTER
Last glasses Rx faxed at [phone]    Allan Bryson RN 1:13 PM 05/14/21          [facility]    Phone Message    May a detailed message be left on voicemail: yes     Reason for Call: Other:   Please refax glasses rx to   Fax # [phone] ASAP. Pt is in the shop now.     Action Taken: Other:  eye     Travel Screening: Not Applicable

## 2021-05-17 ENCOUNTER — MEDICAL CORRESPONDENCE (OUTPATIENT)
Dept: HEALTH INFORMATION MANAGEMENT | Facility: CLINIC | Age: 71
End: 2021-05-17

## 2021-05-17 ENCOUNTER — TELEPHONE (OUTPATIENT)
Dept: OPHTHALMOLOGY | Facility: CLINIC | Age: 71
End: 2021-05-17

## 2021-05-17 NOTE — TELEPHONE ENCOUNTER
[facility] Call [facility]    Phone Message    May a detailed message be left on voicemail: yes     Reason for Call: Form or Letter   Type or form/letter needing completion: Eyeglass Rx  Provider: Dr. Burnett  Date form needed: ASAP  Once completed: Fax form to: [phone]     Symone had called on 5/14 requesting this as well. This message updated with correct fax number to send glasses Rx to. Thank you.      Action Taken: Message routed to:  [facility] EYE    Travel Screening: Not Applicable

## 2021-05-18 ENCOUNTER — TELEPHONE (OUTPATIENT)
Dept: INTERNAL MEDICINE | Facility: CLINIC | Age: 71
End: 2021-05-18

## 2021-05-18 ENCOUNTER — DOCUMENTATION ONLY (OUTPATIENT)
Dept: SLEEP MEDICINE | Facility: CLINIC | Age: 71
End: 2021-05-18
Payer: COMMERCIAL

## 2021-05-18 DIAGNOSIS — F51.04 CHRONIC INSOMNIA: ICD-10-CM

## 2021-05-18 DIAGNOSIS — G47.33 OSA (OBSTRUCTIVE SLEEP APNEA): ICD-10-CM

## 2021-05-18 DIAGNOSIS — G47.39 COMPLEX SLEEP APNEA SYNDROME: ICD-10-CM

## 2021-05-18 NOTE — PROGRESS NOTES
3 day Sleep therapy management telephone visit    Diagnostic AHI: 14  PSG    Confirmed with patient at time of call- Yes Patient is still interested in STM service.       Subjective measures:  Patient doing fine with Bi-Level she is sleeping better feeling more rested.  Patient looking to get a full face mask I told her to wait a week before trying to switch masks.     Replacement device: Yes  STM ordered by provider: Yes    Objective data     Order Settings for PAP  Bi-Level  10/7                  Device settings from machine                           Assessment: Nighty usage most nights over four hours      Action plan: Patient to have 14 day STM visit. Patient has a follow up visit scheduled:   no     Total time spent on accessing and  interpreting remote patient PAP therapy data  10 minutes    Total time spent counseling, coaching  and reviewing PAP therapy data with patient  1 minutes    [id number] no

## 2021-05-19 NOTE — TELEPHONE ENCOUNTER
Diane Ruelas Mark R, MD 13 hours ago (8:04 PM)     Sonya's certification period with home care ended but she is still in need of services.  Requesting new orders for a home care nursing assessment please.   Thanks,   Diane Ruelas RN BSN, Regional Clinical Services Director   [facility]      Please place these orders in for cosign per protocol.

## 2021-05-24 ENCOUNTER — TELEPHONE (OUTPATIENT)
Dept: UROLOGY | Facility: CLINIC | Age: 71
End: 2021-05-24

## 2021-05-24 NOTE — CONFIDENTIAL NOTE
Called to reschedule surgery from 7/8/21 to 7/15/21 @ [facility] OR with Dr Gordon due to physician's schedule. Patient aware of the update. Surgery packet mailed by 5/27/21

## 2021-05-25 ENCOUNTER — TELEPHONE (OUTPATIENT)
Dept: INTERNAL MEDICINE | Facility: CLINIC | Age: 71
End: 2021-05-25

## 2021-05-25 NOTE — TELEPHONE ENCOUNTER
FUTURE VISIT INFORMATION      SURGERY INFORMATION:    Date: 7/15/21    Location: [facility]    Surgeon: Loki Gordon MD    Anesthesia Type:  mac    Procedure: CYSTOSCOPY, INJECT BOTOX INTO BLADDEr    RECORDS REQUESTED FROM:       Primary Care Provider: Allan Casey MD- [facility]    Pertinent Medical History: copd, elfego, hypertension, pad, cad    Most recent EKG+ Tracin21    Most recent ECHO: 20    Most recent Cardiac Stress Test: 10/2/17    Most recent Coronary Angiogram: 18    Most recent PFT's: 20    Most recent Sleep Study:  3/31/21

## 2021-05-25 NOTE — TELEPHONE ENCOUNTER
Verbal orders given for:      1) continuation of monthly supra pubic catheter changes        2) PT/OT eval and treatment for strengthening and safety with transfers. Patient has had 2 falls recently.        3) Stage 1 pressure sore on coccyx 0.5 cm X 0.5 cm. Verbal order given to cover with padded dressing until healed.   RN will fax for MD signature.  Paulina Velez RN

## 2021-05-28 ENCOUNTER — DOCUMENTATION ONLY (OUTPATIENT)
Dept: SLEEP MEDICINE | Facility: CLINIC | Age: 71
End: 2021-05-28
Payer: COMMERCIAL

## 2021-05-28 DIAGNOSIS — F51.04 CHRONIC INSOMNIA: ICD-10-CM

## 2021-05-28 DIAGNOSIS — G47.33 OSA (OBSTRUCTIVE SLEEP APNEA): ICD-10-CM

## 2021-05-28 DIAGNOSIS — G47.39 COMPLEX SLEEP APNEA SYNDROME: ICD-10-CM

## 2021-05-28 NOTE — PROGRESS NOTES
14  DAY STM VISIT    Diagnostic AHI: 14  PSG    Subjective measures:  Patient having mask issues and looking to get a new mask.     Assessment: Pt not meeting objective benchmarks for AHI, leak and compliance  Patient failing following subjective benchmarks: mask discomfort     Action plan: pt to have 30 day STM visit.      Device type: Bilevel    PAP settings:            Mask type:  Nasal Mask    Objective measures: 14 day rolling measures      Compliance  57 %      Leak  39.7  lpm  last  upload      AHI 28.19   last  upload      Average number of minutes 283      Objective measure goal  Compliance   Goal >70%  Leak   Goal < 24 lpm  AHI  Goal < 5  Usage  Goal >240        Total time spent on accessing and interpreting remote patient PAP therapy data  12 minutes    Total time spent counseling, coaching  and reviewing PAP therapy data with patient  4 minutes    [id number]  [id number]  no (3 day STM)

## 2021-06-01 ENCOUNTER — TELEPHONE (OUTPATIENT)
Dept: INTERNAL MEDICINE | Facility: CLINIC | Age: 71
End: 2021-06-01

## 2021-06-01 DIAGNOSIS — R30.0 DYSURIA: Primary | ICD-10-CM

## 2021-06-01 NOTE — TELEPHONE ENCOUNTER
Left VM message at LT facility for RN to call clinic.    Paulina Castanon, Triage RN  [facility]

## 2021-06-01 NOTE — TELEPHONE ENCOUNTER
Paulina nurse assistant calling states pt is complaining of dysuria that started yesterday.  Denies blood in the urine. Has hx of utis.  Wondering if can get an order for UA/UC?    Paulina can be reached at [phone].    Jennifer MCCLAIN RN  EP Triage

## 2021-06-02 ENCOUNTER — RECORDS - HEALTHEAST (OUTPATIENT)
Dept: LAB | Facility: CLINIC | Age: 71
End: 2021-06-02

## 2021-06-02 NOTE — TELEPHONE ENCOUNTER
Paulina called back from [facility] facility - [place] [facility]     They need orders faxed for UA/UC     Fax: [phone]    Symptoms: pain and dysuria     Signed written order per below and faxed electronically to her     Johnna ZHAO RN

## 2021-06-03 DIAGNOSIS — H40.1133 PRIMARY OPEN ANGLE GLAUCOMA OF BOTH EYES, SEVERE STAGE: ICD-10-CM

## 2021-06-03 DIAGNOSIS — Z11.59 ENCOUNTER FOR SCREENING FOR OTHER VIRAL DISEASES: ICD-10-CM

## 2021-06-04 RX ORDER — BRIMONIDINE TARTRATE 2 MG/ML
1 SOLUTION/ DROPS OPHTHALMIC 2 TIMES DAILY
Qty: 5 ML | Refills: 5 | Status: SHIPPED | OUTPATIENT
Start: 2021-06-04 | End: 2022-01-01

## 2021-06-04 NOTE — TELEPHONE ENCOUNTER
Last Clinic Visit: 5-    Per clinic note:  PLAN  Patient will continue on   - Cosopt (Timolol/Dorzolamide) which is a blue top drop 2x/day (12 hours apart) in the RIGHT EYE ONLY,  - Latanoprost which is a teal top drop at bedtime in both eyes, and   - Alphagan (Brimonidine) which is a purple top drop 2x/day (12 hours apart) in the RIGHT EYE ONLY.   - Patient will return to clinic in 6 months with evaluation, dilated eye exam, OCT with RNFL analysis and IOP check.  - recommend ashish with low vision specialist  - Legally blind paperwork (will fill out )     - follow up in 6 months; intraocular pressure check and nerve fiber layer right eye  sooner as needed;

## 2021-06-05 LAB — BACTERIA SPEC CULT: ABNORMAL

## 2021-06-07 DIAGNOSIS — Z53.9 DIAGNOSIS NOT YET DEFINED: Primary | ICD-10-CM

## 2021-06-07 PROCEDURE — G0180 MD CERTIFICATION HHA PATIENT: HCPCS | Performed by: INTERNAL MEDICINE

## 2021-06-09 DIAGNOSIS — H40.1133 PRIMARY OPEN ANGLE GLAUCOMA OF BOTH EYES, SEVERE STAGE: Primary | ICD-10-CM

## 2021-06-14 ENCOUNTER — TELEPHONE (OUTPATIENT)
Dept: INTERNAL MEDICINE | Facility: CLINIC | Age: 71
End: 2021-06-14

## 2021-06-14 NOTE — TELEPHONE ENCOUNTER
Alexandria Velasquez Rn with [facility] calling states pt reported to her that she thought her catheter broke off last night.  Nurse went out today to assess pt and is not sure if part of the catheter is still inside pt.  Pt's bp was 78/50 and dizzy.  911 was called and pt is being taken to [facility].    Alexandria can be reached at [phone].    Jennifer MCCLAIN RN  EP Triage

## 2021-06-17 ENCOUNTER — DOCUMENTATION ONLY (OUTPATIENT)
Dept: SLEEP MEDICINE | Facility: CLINIC | Age: 71
End: 2021-06-17

## 2021-06-17 DIAGNOSIS — G47.39 COMPLEX SLEEP APNEA SYNDROME: ICD-10-CM

## 2021-06-17 DIAGNOSIS — G47.33 OSA ON CPAP: Primary | ICD-10-CM

## 2021-06-17 DIAGNOSIS — G47.33 OSA (OBSTRUCTIVE SLEEP APNEA): ICD-10-CM

## 2021-06-17 DIAGNOSIS — F51.04 CHRONIC INSOMNIA: ICD-10-CM

## 2021-06-17 NOTE — PROGRESS NOTES
Patient came to virtual set up via telephone visit for mask fitting appointment on June 17, 2021. Patient requested to switch masks because incorrect mask size. Discussed the following masks: Mask is falling apart and pt is wanting a FFM Patient selected a Resmed Mask name: Airfit F20 Full Face mask size For Her

## 2021-06-17 NOTE — PROGRESS NOTES
30 DAY STM VISIT    Diagnostic AHI: 14    PSG    Subjective measures:   Patient reports that she has not received a new mask.  She has been working with [employer].  She currently has full face mask.    Her mask keeps falling apart.   She is getting frustrated because she has not heard back from anyone .    Assessment: Pt not meeting objective benchmarks for AHI, leak and compliance  Patient failing following subjective benchmarks: leak issues   Action plan: 2 week STM recheck appt scheduled  Patient has scheduled a follow up visit with Dr. Wright   Device type: Bilevel     Bilevel S ()    EPAP Fixed 7    IPAP Fixed 10        Mask type:  Nasal Mask  Objective measures: 14 day rolling measures      Compliance  28 %      Leak  46.06 lpm  last  upload      AHI 19.35   last  upload      Average number of minutes 190      Objective measure goal  Compliance   Goal >70%  Leak   Goal < 24 lpm  AHI  Goal < 5  Usage  Goal >240        Total time spent on accessing and interpreting remote patient PAP therapy data  10 minutes    Total time spent counseling, coaching  and reviewing PAP therapy data with patient  3 minutes     [id number] this call  [id number] no  at 3 or 14 day [facility]

## 2021-06-22 DIAGNOSIS — Z11.59 ENCOUNTER FOR SCREENING FOR OTHER VIRAL DISEASES: ICD-10-CM

## 2021-06-28 DIAGNOSIS — R35.0 URINARY FREQUENCY: ICD-10-CM

## 2021-06-28 DIAGNOSIS — E11.9 DIABETES MELLITUS TYPE 2 WITHOUT RETINOPATHY (H): ICD-10-CM

## 2021-06-28 DIAGNOSIS — F25.9 SCHIZOAFFECTIVE DISORDER, UNSPECIFIED TYPE (H): ICD-10-CM

## 2021-06-28 DIAGNOSIS — L89.132: ICD-10-CM

## 2021-06-28 DIAGNOSIS — E78.5 HYPERLIPIDEMIA LDL GOAL <100: ICD-10-CM

## 2021-06-28 DIAGNOSIS — Z86.79 HISTORY OF CORONARY ARTERY DISEASE: ICD-10-CM

## 2021-06-28 NOTE — NURSING NOTE
"Chief Complaint   Patient presents with     CPAP Follow Up     needs a new machine       Initial /70   Pulse 83   SpO2 93%  Estimated body mass index is 23.03 kg/m  as calculated from the following:    Height as of 6/14/19: 1.6 m (5' 3\").    Weight as of 6/19/19: 59 kg (130 lb).    Medication Reconciliation: complete      " all other ROS negative except as per HPI

## 2021-06-29 RX ORDER — TRAZODONE HYDROCHLORIDE 150 MG/1
TABLET ORAL
Qty: 28 TABLET | Refills: 11 | Status: SHIPPED | OUTPATIENT
Start: 2021-06-29

## 2021-06-29 RX ORDER — FERROUS SULFATE 325(65) MG
TABLET ORAL
Qty: 56 TABLET | Refills: 11 | Status: SHIPPED | OUTPATIENT
Start: 2021-06-29

## 2021-06-29 RX ORDER — ESCITALOPRAM OXALATE 10 MG/1
TABLET ORAL
Qty: 28 TABLET | Refills: 11 | Status: SHIPPED | OUTPATIENT
Start: 2021-06-29

## 2021-06-29 RX ORDER — METOPROLOL SUCCINATE 50 MG/1
TABLET, EXTENDED RELEASE ORAL
Qty: 28 TABLET | Refills: 11 | Status: SHIPPED | OUTPATIENT
Start: 2021-06-29

## 2021-06-29 RX ORDER — DOCUSATE SODIUM 50MG AND SENNOSIDES 8.6MG 8.6; 5 MG/1; MG/1
TABLET, FILM COATED ORAL
Qty: 56 TABLET | Refills: 11 | Status: SHIPPED | OUTPATIENT
Start: 2021-06-29

## 2021-06-29 RX ORDER — OMEGA-3S/DHA/EPA/FISH OIL/D3 300MG-1000
CAPSULE ORAL
Qty: 56 TABLET | Refills: 11 | Status: SHIPPED | OUTPATIENT
Start: 2021-06-29

## 2021-06-29 RX ORDER — SOLIFENACIN SUCCINATE 10 MG/1
TABLET, FILM COATED ORAL
Qty: 28 TABLET | Refills: 11 | Status: SHIPPED | OUTPATIENT
Start: 2021-06-29

## 2021-06-29 RX ORDER — ASPIRIN 81 MG
TABLET,CHEWABLE ORAL
Qty: 28 TABLET | Refills: 11 | Status: SHIPPED | OUTPATIENT
Start: 2021-06-29

## 2021-06-29 RX ORDER — ATORVASTATIN CALCIUM 40 MG/1
TABLET, FILM COATED ORAL
Qty: 28 TABLET | Refills: 11 | Status: SHIPPED | OUTPATIENT
Start: 2021-06-29

## 2021-06-29 NOTE — TELEPHONE ENCOUNTER
9 requests long term care requesting full year refills.    Not RN protocol.    Please refill as appropriate.    Laureen Solano RN  [facility]

## 2021-07-01 ENCOUNTER — TELEPHONE (OUTPATIENT)
Dept: INTERNAL MEDICINE | Facility: CLINIC | Age: 71
End: 2021-07-01

## 2021-07-02 ENCOUNTER — DOCUMENTATION ONLY (OUTPATIENT)
Dept: SLEEP MEDICINE | Facility: CLINIC | Age: 71
End: 2021-07-02

## 2021-07-02 ENCOUNTER — TELEPHONE (OUTPATIENT)
Dept: INTERNAL MEDICINE | Facility: CLINIC | Age: 71
End: 2021-07-02

## 2021-07-02 NOTE — TELEPHONE ENCOUNTER
Alexandria from [facility] wants wound care orders discontinued for pt. Pressure ulcer on coccyx has healed, they had been going out once a week for that so wants to stop that but is asking for skilled nursing orders monthly for catheter needs and PRN in regards to catheter issues.    Ok to approve?    Alexandria [phone] ok to leave detailed message

## 2021-07-02 NOTE — TELEPHONE ENCOUNTER
See previous message. Homecare orders approved.     EDWARDO Ibrahim informed.     Valeria GIFFORD, RN, PHN

## 2021-07-06 ENCOUNTER — HOSPITAL ENCOUNTER (EMERGENCY)
Facility: CLINIC | Age: 71
Discharge: NURSING, REHAB OR LONG-TERM CARE | End: 2021-07-06
Attending: EMERGENCY MEDICINE | Admitting: EMERGENCY MEDICINE
Payer: COMMERCIAL

## 2021-07-06 VITALS
DIASTOLIC BLOOD PRESSURE: 57 MMHG | RESPIRATION RATE: 16 BRPM | OXYGEN SATURATION: 96 % | SYSTOLIC BLOOD PRESSURE: 98 MMHG | WEIGHT: 117 LBS | TEMPERATURE: 98.8 F | HEIGHT: 55 IN | BODY MASS INDEX: 27.08 KG/M2 | HEART RATE: 75 BPM

## 2021-07-06 DIAGNOSIS — M54.2 CERVICALGIA: ICD-10-CM

## 2021-07-06 DIAGNOSIS — R51.9 NONINTRACTABLE HEADACHE, UNSPECIFIED CHRONICITY PATTERN, UNSPECIFIED HEADACHE TYPE: ICD-10-CM

## 2021-07-06 LAB
ALBUMIN SERPL-MCNC: 2.7 G/DL (ref 3.4–5)
ALP SERPL-CCNC: 177 U/L (ref 40–150)
ALT SERPL W P-5'-P-CCNC: 40 U/L (ref 0–50)
ANION GAP SERPL CALCULATED.3IONS-SCNC: 5 MMOL/L (ref 3–14)
AST SERPL W P-5'-P-CCNC: 34 U/L (ref 0–45)
BASOPHILS # BLD AUTO: 0 10E9/L (ref 0–0.2)
BASOPHILS NFR BLD AUTO: 0.4 %
BILIRUB SERPL-MCNC: <0.1 MG/DL (ref 0.2–1.3)
BUN SERPL-MCNC: 19 MG/DL (ref 7–30)
CALCIUM SERPL-MCNC: 8.7 MG/DL (ref 8.5–10.1)
CHLORIDE SERPL-SCNC: 105 MMOL/L (ref 94–109)
CO2 SERPL-SCNC: 29 MMOL/L (ref 20–32)
CREAT SERPL-MCNC: 0.83 MG/DL (ref 0.52–1.04)
DIFFERENTIAL METHOD BLD: ABNORMAL
EOSINOPHIL # BLD AUTO: 0.1 10E9/L (ref 0–0.7)
EOSINOPHIL NFR BLD AUTO: 1.2 %
ERYTHROCYTE [DISTWIDTH] IN BLOOD BY AUTOMATED COUNT: 13.1 % (ref 10–15)
GFR SERPL CREATININE-BSD FRML MDRD: 70 ML/MIN/{1.73_M2}
GLUCOSE SERPL-MCNC: 134 MG/DL (ref 70–99)
HCT VFR BLD AUTO: 30.1 % (ref 35–47)
HGB BLD-MCNC: 9.9 G/DL (ref 11.7–15.7)
IMM GRANULOCYTES # BLD: 0.1 10E9/L (ref 0–0.4)
IMM GRANULOCYTES NFR BLD: 0.6 %
LACTATE BLD-SCNC: 2 MMOL/L (ref 0.7–2)
LYMPHOCYTES # BLD AUTO: 1.2 10E9/L (ref 0.8–5.3)
LYMPHOCYTES NFR BLD AUTO: 11.3 %
MCH RBC QN AUTO: 30.9 PG (ref 26.5–33)
MCHC RBC AUTO-ENTMCNC: 32.9 G/DL (ref 31.5–36.5)
MCV RBC AUTO: 94 FL (ref 78–100)
MONOCYTES # BLD AUTO: 1.2 10E9/L (ref 0–1.3)
MONOCYTES NFR BLD AUTO: 11.1 %
NEUTROPHILS # BLD AUTO: 8.1 10E9/L (ref 1.6–8.3)
NEUTROPHILS NFR BLD AUTO: 75.4 %
NRBC # BLD AUTO: 0 10*3/UL
NRBC BLD AUTO-RTO: 0 /100
PLATELET # BLD AUTO: 309 10E9/L (ref 150–450)
POTASSIUM SERPL-SCNC: 3.7 MMOL/L (ref 3.4–5.3)
PROT SERPL-MCNC: 7.4 G/DL (ref 6.8–8.8)
RBC # BLD AUTO: 3.2 10E12/L (ref 3.8–5.2)
SODIUM SERPL-SCNC: 140 MMOL/L (ref 133–144)
WBC # BLD AUTO: 10.8 10E9/L (ref 4–11)

## 2021-07-06 PROCEDURE — 80053 COMPREHEN METABOLIC PANEL: CPT | Performed by: EMERGENCY MEDICINE

## 2021-07-06 PROCEDURE — 250N000011 HC RX IP 250 OP 636: Performed by: EMERGENCY MEDICINE

## 2021-07-06 PROCEDURE — 99284 EMERGENCY DEPT VISIT MOD MDM: CPT | Mod: 25 | Performed by: EMERGENCY MEDICINE

## 2021-07-06 PROCEDURE — 83605 ASSAY OF LACTIC ACID: CPT | Performed by: EMERGENCY MEDICINE

## 2021-07-06 PROCEDURE — 96374 THER/PROPH/DIAG INJ IV PUSH: CPT | Performed by: EMERGENCY MEDICINE

## 2021-07-06 PROCEDURE — 85025 COMPLETE CBC W/AUTO DIFF WBC: CPT | Performed by: EMERGENCY MEDICINE

## 2021-07-06 PROCEDURE — 99284 EMERGENCY DEPT VISIT MOD MDM: CPT | Performed by: EMERGENCY MEDICINE

## 2021-07-06 RX ORDER — ONDANSETRON 2 MG/ML
4 INJECTION INTRAMUSCULAR; INTRAVENOUS ONCE
Status: COMPLETED | OUTPATIENT
Start: 2021-07-06 | End: 2021-07-06

## 2021-07-06 RX ADMIN — ONDANSETRON 4 MG: 2 INJECTION INTRAMUSCULAR; INTRAVENOUS at 04:54

## 2021-07-06 ASSESSMENT — MIFFLIN-ST. JEOR: SCORE: 692.34

## 2021-07-06 NOTE — ED PROVIDER NOTES
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  She admits that she chronically has fairly significant neck pain for many years ever since her stroke in the 1970s.  However, she states recently the pain has been worse, over the last few weeks.  She states tonight it was very bad, and she felt a trigger to headache and the frontal portion of her head.  Headache seems to have been a gradual onset.  She denies any new neuro symptoms.  She feels her speech is the same as it always is.  She does not feel she is weaker than normal.  While waiting to be seen by me she states she feels much better.  She did get some fentanyl by EMS while in route, though that was approximately 2 hours prior to me seeing her.  At the time I saw her she states that her headache is nearly completely resolved and her neck pain is close to its baseline.  She does report a little nausea currently, though states that she does have chronic nausea as well.  She denies any abdominal pain, fever, cough, shortness of breath, vomiting, diarrhea.  She denies any recent trauma.    Past Medical History  Past Medical History:   Diagnosis Date     Acid reflux disease      Benign essential hypertension      Blind left eye     due to central retinal artery occlusion     CAD (coronary artery disease)     UA and inferior MI in 2016 s/p PCI     Chest pain on breathing 06/10/2020     Chronic back pain      Chronic neck pain      Chronic pain syndrome     with fentanyl intrathecal pain pump     Complex sleep apnea syndrome      COPD (chronic  obstructive pulmonary disease) (H)      Dysphagia     chronic due to esophageal strictures and multiple previous dilitations      ROGER (generalized anxiety disorder)      History of blood transfusion     x5; no adverse reactions     History of central retinal artery occlusion     left-sided 2006     History of stroke     residual left-sided weakness     Hx of carotid artery stenosis     s/p left carotid stent in 2006 and balloon angioplasty in 2016     MDD (major depressive disorder)      Neurogenic bladder     SPT in place     JOSE (obstructive sleep apnea)      Osteoporosis      PAD (peripheral artery disease) (H)      Peripheral neuropathy      Person who has had sex change operation     male to female     Seizure disorder (H)     many years since last grand mal; daily, brief petit mals     Sickle cell trait (H)      Type 2 diabetes mellitus (H)      Past Surgical History:   Procedure Laterality Date     AMPUTATE LEG ABOVE KNEE Left 6/11/2016    Procedure: AMPUTATE LEG ABOVE KNEE;  Surgeon: Mello Rodriguez MD;  [facility]: [facility]     AMPUTATE LEG BELOW KNEE Right 11/7/2016    Procedure: AMPUTATE LEG BELOW KNEE;  Surgeon: Savannah Durant MD;  [facility]     AMPUTATE REVISION STUMP LOWER EXTREMITY Right 11/11/2016    Procedure: AMPUTATE REVISION STUMP LOWER EXTREMITY;  Surgeon: Savannah Durant MD;  [facility]     AMPUTATE REVISION STUMP LOWER EXTREMITY Right 11/16/2016    Procedure: AMPUTATE REVISION STUMP LOWER EXTREMITY;  Surgeon: Savannah Durant MD;  [facility]     AMPUTATE TOE(S) Right 1/5/2016    Procedure: AMPUTATE TOE(S);  Surgeon: Mello Gaines DPM;  Location: [facility]     ANGIOGRAM Bilateral 11/21/2014    Procedure: ANGIOGRAM;  Surgeon: Savannah Durant MD;  [facility]     ANGIOGRAM Left 1/16/2015    Procedure: ANGIOGRAM;  Surgeon: Savannah Durant MD;  [facility]     ANGIOGRAM Bilateral 9/14/2015    Procedure: ANGIOGRAM;  Surgeon: Savannah Durant MD;  [facility]     ANGIOGRAM Left  10/12/2015    Procedure: ANGIOGRAM;  Surgeon: Savannah Durant MD;  [facility]     ANGIOGRAM Right 6/6/2016    Procedure: ANGIOGRAM;  Surgeon: Savannah Durant MD;  [facility]     ANGIOPLASTY Right 6/6/2016    Procedure: ANGIOPLASTY;  Surgeon: Savannah Durant MD;  [facility]     APPENDECTOMY       BREAST BIOPSY, RT/LT Right     benign     CATARACT IOL, RT/LT Right      CHOLECYSTECTOMY       COLONOSCOPY N/A 8/25/2014    Procedure: COLONOSCOPY;  Surgeon: Mello Ferrer MD;  [facility]     COLONOSCOPY WITH CO2 INSUFFLATION N/A 8/20/2014    Procedure: COLONOSCOPY WITH CO2 INSUFFLATION;  Surgeon: Duane, William Charles, MD;  Location: [facility]     CYSTOSCOPY, INJECT BOTOX N/A 5/21/2020    Procedure: CYSTOSCOPY, WITH BOTULINUM TOXIN INJECTION;  Surgeon: Loki Gordon MD;  [facility]     CYSTOSCOPY, INJECT BOTOX N/A 10/8/2020    Procedure: CYSTOSCOPY, INJECT BOTOX INTO BLADDER, SUPRAPUBIC TUBE EXCHNAGE;  Surgeon: Loki Gordon MD;  [facility]     CYSTOSCOPY, INJECT BOTOX N/A 1/7/2021    Procedure: CYSTOSCOPY, WITH BOTULINUM TOXIN INJECTION;  Surgeon: Loki Gordon MD;  [facility]     CYSTOSCOPY, INTRAVESICAL INJECTION N/A 10/31/2019    Procedure: CYSTOSCOPY, BOTOX INJECTION, Suprapubic Catheter Exchange;  Surgeon: Loki Gordon MD;  [facility]     CYSTOSTOMY, INSERT TUBE SUPRAPUBIC, COMBINED N/A 1/16/2018    Procedure: COMBINED CYSTOSTOMY, INSERT TUBE SUPRAPUBIC;  Cystoscopy, Intraoperative Ultrasound, Suprapubic Tube Placement;  Surgeon: Keanu Dawson MD;  [facility]     ENDARTERECTOMY FEMORAL  5/23/2014    Procedure: ENDARTERECTOMY FEMORAL;  Surgeon: Jason Joshi MD;  [facility]     ESOPHAGOSCOPY, GASTROSCOPY, DUODENOSCOPY (EGD), COMBINED  12/14/2012    Procedure: COMBINED ESOPHAGOSCOPY, GASTROSCOPY, DUODENOSCOPY (EGD), BIOPSY SINGLE OR MULTIPLE;  ESOPHAGOSCOPY, GASTROSCOPY, DUODENOSCOPY (EGD), DILATATION ;  Surgeon: Graham  Elizabeth RIVERA MD;  Location:  GI     ESOPHAGOSCOPY, GASTROSCOPY, DUODENOSCOPY (EGD), COMBINED  12/31/2013    Procedure: COMBINED ESOPHAGOSCOPY, GASTROSCOPY, DUODENOSCOPY (EGD), BIOPSY SINGLE OR MULTIPLE;;  Surgeon: Clemente Lopez MD;  Location:  GI     ESOPHAGOSCOPY, GASTROSCOPY, DUODENOSCOPY (EGD), COMBINED  4/1/2014    Procedure: COMBINED ESOPHAGOSCOPY, GASTROSCOPY, DUODENOSCOPY (EGD);;  Surgeon: Clemente Lopez MD;  Location:  GI     ESOPHAGOSCOPY, GASTROSCOPY, DUODENOSCOPY (EGD), COMBINED  6/28/2014    Procedure: COMBINED ESOPHAGOSCOPY, GASTROSCOPY, DUODENOSCOPY (EGD);  Surgeon: Clemente Lopez MD;  Location:  GI     ESOPHAGOSCOPY, GASTROSCOPY, DUODENOSCOPY (EGD), COMBINED N/A 8/20/2014    Procedure: COMBINED ESOPHAGOSCOPY, GASTROSCOPY, DUODENOSCOPY (EGD), BIOPSY SINGLE OR MULTIPLE;  Surgeon: Duane, William Charles, MD;  Location: [facility]     ESOPHAGOSCOPY, GASTROSCOPY, DUODENOSCOPY (EGD), COMBINED N/A 8/22/2014    Procedure: COMBINED ESOPHAGOSCOPY, GASTROSCOPY, DUODENOSCOPY (EGD), BIOPSY SINGLE OR MULTIPLE;  Surgeon: Mello Ferrer MD;  Location:  GI     ESOPHAGOSCOPY, GASTROSCOPY, DUODENOSCOPY (EGD), COMBINED N/A 10/2/2014    Procedure: COMBINED ESOPHAGOSCOPY, GASTROSCOPY, DUODENOSCOPY (EGD), BIOPSY SINGLE OR MULTIPLE;  Surgeon: Remy Haskins MD;  Location:  GI     ESOPHAGOSCOPY, GASTROSCOPY, DUODENOSCOPY (EGD), COMBINED Left 12/15/2014    Procedure: COMBINED ESOPHAGOSCOPY, GASTROSCOPY, DUODENOSCOPY (EGD), BIOPSY SINGLE OR MULTIPLE;  Surgeon: Remy Haskins MD;  Location:  GI     ESOPHAGOSCOPY, GASTROSCOPY, DUODENOSCOPY (EGD), COMBINED N/A 2/25/2015    Procedure: COMBINED ENDOSCOPIC ULTRASOUND, ESOPHAGOSCOPY, GASTROSCOPY, DUODENOSCOPY (EGD), FINE NEEDLE ASPIRATE/BIOPSY;  Surgeon: Clemente Lugo MD;  Location:  GI     ESOPHAGOSCOPY, GASTROSCOPY, DUODENOSCOPY (EGD), COMBINED Left 2/25/2015    Procedure: COMBINED ESOPHAGOSCOPY, GASTROSCOPY, DUODENOSCOPY (EGD), BIOPSY SINGLE OR  MULTIPLE;  Surgeon: Clemente Lugo MD;  [facility]     ESOPHAGOSCOPY, GASTROSCOPY, DUODENOSCOPY (EGD), COMBINED N/A 9/25/2016    Procedure: COMBINED ESOPHAGOSCOPY, GASTROSCOPY, DUODENOSCOPY (EGD);  Surgeon: Aziza Patiño MD;  [facility]     ESOPHAGOSCOPY, GASTROSCOPY, DUODENOSCOPY (EGD), COMBINED N/A 1/18/2017    Procedure: COMBINED ESOPHAGOSCOPY, GASTROSCOPY, DUODENOSCOPY (EGD), BIOPSY SINGLE OR MULTIPLE;  Surgeon: Clemente Lopez MD;  [facility]     ESOPHAGOSCOPY, GASTROSCOPY, DUODENOSCOPY (EGD), COMBINED N/A 11/26/2017    Procedure: COMBINED ESOPHAGOSCOPY, GASTROSCOPY, DUODENOSCOPY (EGD), REMOVE FOREIGN BODY;  Esophagogastroduodenoscopy with foreign body extraction  ;  Surgeon: Herberth Castrejon MD;  [facility]     ESOPHAGOSCOPY, GASTROSCOPY, DUODENOSCOPY (EGD), COMBINED N/A 11/26/2017    Procedure: COMBINED ESOPHAGOSCOPY, GASTROSCOPY, DUODENOSCOPY (EGD), REMOVE FOREIGN BODY;;  Surgeon: Herberth Castrejon MD;  [facility]     ESOPHAGOSCOPY, GASTROSCOPY, DUODENOSCOPY (EGD), COMBINED N/A 4/10/2020    Procedure: ESOPHAGOGASTRODUODENOSCOPY (EGD);  Surgeon: Yael Cabral MD;  [facility]     FASCIOTOMY LOWER EXTREMITY Left 6/10/2016    Procedure: FASCIOTOMY LOWER EXTREMITY;  Surgeon: Mello Rodriguez MD;  [facility]     HC CAPSULE ENDOSCOPY N/A 8/25/2014    Procedure: CAPSULE/PILL CAM ENDOSCOPY;  Surgeon: Remy Haskins MD;  [facility]     HC CAPSULE ENDOSCOPY N/A 10/2/2014    Procedure: CAPSULE/PILL CAM ENDOSCOPY;  Surgeon: Remy Haskins MD;  [facility]     INJECT BLOCK MEDIAL BRANCH CERVICAL/THORACIC/LUMBAR Left 10/30/2020    Procedure: Cervical 3, 4, and 5 Medial Branch Block;  Surgeon: Evelyn Lima MD;  Location: [facility] OR     ORTHOPEDIC SURGERY      broken wrist repair     REVISE CATHETER INTRATHECAL  08/24/2020     SEX TRANSFORMATION SURGERY, MALE TO FEMALE  1974 1974     SINUS SURGERY      cyst removed     TONSILLECTOMY        VASCULAR SURGERY  2006    Left carotid stent     ACETAMINOPHEN EXTRA STRENGTH 500 MG tablet  ADVAIR DISKUS 250-50 MCG/DOSE inhaler  albuterol (PROAIR HFA/PROVENTIL HFA/VENTOLIN HFA) 108 (90 Base) MCG/ACT inhaler  albuterol (PROVENTIL) (2.5 MG/3ML) 0.083% neb solution  alendronate (FOSAMAX) 70 MG tablet  ASPERCREME LIDOCAINE 4 % Patch  ASPIRIN LOW DOSE 81 MG chewable tablet  atorvastatin (LIPITOR) 40 MG tablet  blood glucose monitoring (ONE TOUCH ULTRA 2) meter device kit  blood glucose monitoring (ONE TOUCH ULTRA) test strip  blood glucose monitoring (ONE TOUCH ULTRASOFT) lancets  brimonidine (ALPHAGAN) 0.2 % ophthalmic solution  capsaicin-menthol-methyl sal 0.025-1-12 % external cream  diclofenac (VOLTAREN) 1 % topical gel  dorzolamide (TRUSOPT) 2 % ophthalmic solution  EPINEPHrine (ANY BX GENERIC EQUIV) 0.3 MG/0.3ML injection 2-pack  escitalopram (LEXAPRO) 10 MG tablet  FEROSUL 325 (65 Fe) MG tablet  fluticasone (FLONASE) 50 MCG/ACT nasal spray  fluticasone-salmeterol (ADVAIR) 500-50 MCG/DOSE inhaler  gabapentin (NEURONTIN) 100 MG capsule  INCRUSE ELLIPTA 62.5 MCG/INH Inhaler  lactulose (CHRONULAC) 10 GM/15ML solution  latanoprost (XALATAN) 0.005 % ophthalmic solution  levETIRAcetam (KEPPRA) 500 MG tablet  lisinopril (ZESTRIL) 20 MG tablet  loratadine (CLARITIN) 10 MG tablet  Medication given by intrathecal pump  Menthol, Topical Analgesic, 5 % GEL  metFORMIN (GLUCOPHAGE) 500 MG tablet  metoprolol succinate ER (TOPROL-XL) 50 MG 24 hr tablet  Multiple Vitamins-Minerals (TAB-A-MCAY) TABS  multivitamin, therapeutic (THERA-VIT) TABS tablet  mupirocin (BACTROBAN) 2 % external ointment  nicotine (NICODERM CQ) 14 MG/24HR 24 hr patch  nitroGLYcerin (NITROSTAT) 0.4 MG sublingual tablet  Nutritional Supplements (ENSURE) LIQD  oxyCODONE (OXY-IR) 5 MG capsule  pantoprazole (PROTONIX) 40 MG EC tablet  phenytoin (DILANTIN) 100 MG capsule  polyethylene glycol (MIRALAX) 17 GM/SCOOP powder  pregabalin (LYRICA) 150 MG  capsule  risperiDONE (RISPERDAL) 0.5 MG tablet  SENEXON-S 8.6-50 MG tablet  solifenacin (VESICARE) 10 MG tablet  spacer (OPTICHAMBER SANDRA) holding chamber  traZODone (DESYREL) 150 MG tablet  vitamin D3 (CHOLECALCIFEROL) 10 MCG (400 UNIT) capsule  Vitamin D3 (VITAMIN D) 10 MCG (400 UNIT) tablet      Allergies   Allergen Reactions     Bee Venom Anaphylaxis     Penicillins Anaphylaxis     Dilantin [Phenytoin] Other (See Comments)     Generic dilantin only per pt     Iodine Hives     Novocaine [Procaine] Hives     Tositumomab Unknown     Family History  Family History   Problem Relation Age of Onset     Dementia Mother      Diabetes Mother         type unknown     Coronary Artery Disease Mother         MI     Glaucoma Father      Diabetes Father         type unknown     Heart Failure Father      Schizophrenia Brother      Depression Brother      Suicide Sister      Depression Sister      Diabetes Sister      Ovarian Cancer Maternal Aunt      Leukemia Maternal Aunt      Diabetes Maternal Grandmother      Diabetes Maternal Grandfather      Diabetes Paternal Grandmother      Diabetes Paternal Grandfather      Breast Cancer Sister      Cerebrovascular Disease Brother      Colon Cancer No family hx of      Macular Degeneration No family hx of      Social History   Social History     Tobacco Use     Smoking status: Current Every Day Smoker     Packs/day: 1.00     Years: 30.00     Pack years: 30.00     Types: Cigarettes, Cigars     Smokeless tobacco: Never Used     Tobacco comment: 1.5 packs per day    Substance Use Topics     Alcohol use: No     Alcohol/week: 0.0 standard drinks     Drug use: No      Past medical history, past surgical history, medications, allergies, family history, and social history were reviewed with the patient. No additional pertinent items.       Review of Systems  A complete review of systems was performed with pertinent positives and negatives noted in the HPI, and all other systems  "negative.    Physical Exam   BP: 101/56  Pulse: 81  Temp: 98.8  F (37.1  C)  Resp: 16  Height: 109.2 cm (3' 7\")  Weight: 53.1 kg (117 lb)  SpO2: 96 %  Physical Exam  Constitutional:       General: She is not in acute distress.     Appearance: She is not diaphoretic.   HENT:      Head: Atraumatic.   Eyes:      General: No scleral icterus.     Pupils: Pupils are equal, round, and reactive to light.   Neck:     Cardiovascular:      Heart sounds: Normal heart sounds.   Pulmonary:      Effort: No respiratory distress.      Breath sounds: Normal breath sounds.   Abdominal:      General: Bowel sounds are normal.      Palpations: Abdomen is soft.      Tenderness: There is no abdominal tenderness.      Comments: Suprapubic catheter   Musculoskeletal:         General: No tenderness.      Comments: Bilateral leg amputation   Skin:     General: Skin is warm.      Findings: No rash.   Neurological:      Mental Status: She is oriented to person, place, and time.      Sensory: No sensory deficit.      Motor: Weakness (mild left upper extremity weakness) present.      Comments: Pt has dysarthria, cranial nerves otherwise intact         ED Course      Procedures        The patient has a lactate of 2 with no signs of sepsis.  This is within normal limits.     Results for orders placed or performed during the hospital encounter of 07/06/21   CBC with platelets differential     Status: Abnormal   Result Value Ref Range    WBC 10.8 4.0 - 11.0 10e9/L    RBC Count 3.20 (L) 3.8 - 5.2 10e12/L    Hemoglobin 9.9 (L) 11.7 - 15.7 g/dL    Hematocrit 30.1 (L) 35.0 - 47.0 %    MCV 94 78 - 100 fl    MCH 30.9 26.5 - 33.0 pg    MCHC 32.9 31.5 - 36.5 g/dL    RDW 13.1 10.0 - 15.0 %    Platelet Count 309 150 - 450 10e9/L    Diff Method Automated Method     % Neutrophils 75.4 %    % Lymphocytes 11.3 %    % Monocytes 11.1 %    % Eosinophils 1.2 %    % Basophils 0.4 %    % Immature Granulocytes 0.6 %    Nucleated RBCs 0 0 /100    Absolute Neutrophil 8.1 " 1.6 - 8.3 10e9/L    Absolute Lymphocytes 1.2 0.8 - 5.3 10e9/L    Absolute Monocytes 1.2 0.0 - 1.3 10e9/L    Absolute Eosinophils 0.1 0.0 - 0.7 10e9/L    Absolute Basophils 0.0 0.0 - 0.2 10e9/L    Abs Immature Granulocytes 0.1 0 - 0.4 10e9/L    Absolute Nucleated RBC 0.0    Comprehensive metabolic panel     Status: Abnormal   Result Value Ref Range    Sodium 140 133 - 144 mmol/L    Potassium 3.7 3.4 - 5.3 mmol/L    Chloride 105 94 - 109 mmol/L    Carbon Dioxide 29 20 - 32 mmol/L    Anion Gap 5 3 - 14 mmol/L    Glucose 134 (H) 70 - 99 mg/dL    Urea Nitrogen 19 7 - 30 mg/dL    Creatinine 0.83 0.52 - 1.04 mg/dL    GFR Estimate 70 >60 mL/min/[1.73_m2]    GFR Estimate If Black 81 >60 mL/min/[1.73_m2]    Calcium 8.7 8.5 - 10.1 mg/dL    Bilirubin Total <0.1 (L) 0.2 - 1.3 mg/dL    Albumin 2.7 (L) 3.4 - 5.0 g/dL    Protein Total 7.4 6.8 - 8.8 g/dL    Alkaline Phosphatase 177 (H) 40 - 150 U/L    ALT 40 0 - 50 U/L    AST 34 0 - 45 U/L   Lactic acid whole blood     Status: None   Result Value Ref Range    Lactic Acid 2.0 0.7 - 2.0 mmol/L     Medications   ondansetron (ZOFRAN) injection 4 mg (4 mg Intravenous Given 7/6/21 2878)        Assessments & Plan (with Medical Decision Making)   Patient reports chronic neck pain though states is been worse for the last several weeks.  She states it was bothering her particularly tonight, she developed headaches, so came in for evaluation.  She did receive a dose of fentanyl in the rig, and states that her headache is nearly completely resolved and her neck pain is dramatically improved by the time I saw her.  Due to the ED being busy tonight, there was a delay in my seeing her, and her dose of fentanyl was likely at least 2 hours prior to me seeing her, thus almost certainly worn off by then.  She denies any new neuro symptoms.  Headache sounds like it was gradual in onset.  No red flag symptoms to make me concerned for subarachnoid hemorrhage or meningitis.  No infectious signs or  symptoms.  No trauma.  She did also complain of little bit of nausea which she states is chronic for her.  She was given a dose of Zofran here.  She will be discharged back to her care facility encouraged to follow-up with her outpatient provider, return to ER with any new or worsening symptoms or any other concerns.    Dictation Disclaimer: Some of this Note has been completed with voice-recognition dictation software. Although errors are generally corrected real-time, there is the potential for a rare error to be present in the completed chart.      I have reviewed the nursing notes. I have reviewed the findings, diagnosis, plan and need for follow up with the patient.    Discharge Medication List as of 7/6/2021  5:02 AM          Final diagnoses:   Cervicalgia   Nonintractable headache, unspecified chronicity pattern, unspecified headache type       --  Hannah Horan  [facility] EMERGENCY DEPARTMENT  7/6/2021     Hannah Horan MD  07/06/21 0657

## 2021-07-06 NOTE — ED NOTES
Bed: ED17  Expected date:   Expected time:   Means of arrival:   Comments:  [facility], 71F with headache and hx of a stroke, 50 of fentanyl and zofran given ETA 0220

## 2021-07-06 NOTE — ED TRIAGE NOTES
BIBA from care facility   Hx MIs CVAs  10/10 HA this evening, burning into neck  No relief with tylenol  Has internal PCA pump of   90s/50s baseline, dropped to 70s with fentanyl  NSR   4 mg zofran, 250 ml fluids, 50 fentanyl (decreased pain)  Nursing staff placed her soft collar and travel pillows

## 2021-07-07 ENCOUNTER — TELEPHONE (OUTPATIENT)
Dept: UROLOGY | Facility: CLINIC | Age: 71
End: 2021-07-07

## 2021-07-07 NOTE — CONFIDENTIAL NOTE
Called to inform patient of surgery date change for 7/15/21 to 7/22/21 at [facility] OR with Dr Gordon.  Left voice mail with information and phone number for a call back at [phone] for confirmation.

## 2021-07-12 ENCOUNTER — PRE VISIT (OUTPATIENT)
Dept: SURGERY | Facility: CLINIC | Age: 71
End: 2021-07-12

## 2021-07-15 ENCOUNTER — TELEPHONE (OUTPATIENT)
Dept: INTERNAL MEDICINE | Facility: CLINIC | Age: 71
End: 2021-07-15

## 2021-07-15 NOTE — TELEPHONE ENCOUNTER
EDWARDO Martines with [facility] called to request verbal order for . Patient has been asking questions and possible request to begin Hospice.     OK'd requested orders.  Orders will be faxed to PCP for review and signature.   Keshia Kiser RN

## 2021-07-20 ENCOUNTER — PATIENT OUTREACH (OUTPATIENT)
Dept: UROLOGY | Facility: CLINIC | Age: 71
End: 2021-07-20

## 2021-07-20 ENCOUNTER — OFFICE VISIT (OUTPATIENT)
Dept: SURGERY | Facility: CLINIC | Age: 71
End: 2021-07-20
Payer: COMMERCIAL

## 2021-07-20 ENCOUNTER — ANESTHESIA EVENT (OUTPATIENT)
Dept: SURGERY | Facility: CLINIC | Age: 71
End: 2021-07-20
Payer: COMMERCIAL

## 2021-07-20 ENCOUNTER — LAB (OUTPATIENT)
Dept: LAB | Facility: CLINIC | Age: 71
End: 2021-07-20

## 2021-07-20 VITALS
BODY MASS INDEX: 27.08 KG/M2 | OXYGEN SATURATION: 95 % | DIASTOLIC BLOOD PRESSURE: 56 MMHG | RESPIRATION RATE: 16 BRPM | WEIGHT: 117 LBS | HEART RATE: 58 BPM | SYSTOLIC BLOOD PRESSURE: 87 MMHG | HEIGHT: 55 IN | TEMPERATURE: 97 F

## 2021-07-20 DIAGNOSIS — N39.41 URGE INCONTINENCE OF URINE: ICD-10-CM

## 2021-07-20 DIAGNOSIS — N32.81 OVERACTIVE BLADDER: ICD-10-CM

## 2021-07-20 DIAGNOSIS — Z11.59 ENCOUNTER FOR SCREENING FOR OTHER VIRAL DISEASES: ICD-10-CM

## 2021-07-20 DIAGNOSIS — Z01.812 PRE-PROCEDURE LAB EXAM: Primary | ICD-10-CM

## 2021-07-20 DIAGNOSIS — Z01.818 PREOP EXAMINATION: Primary | ICD-10-CM

## 2021-07-20 DIAGNOSIS — Z01.812 PRE-PROCEDURE LAB EXAM: ICD-10-CM

## 2021-07-20 LAB
ALBUMIN UR-MCNC: NEGATIVE MG/DL
APPEARANCE UR: ABNORMAL
BACTERIA #/AREA URNS HPF: ABNORMAL /HPF
BILIRUB UR QL STRIP: NEGATIVE
COLOR UR AUTO: YELLOW
GLUCOSE UR STRIP-MCNC: NEGATIVE MG/DL
HGB UR QL STRIP: ABNORMAL
KETONES UR STRIP-MCNC: NEGATIVE MG/DL
LEUKOCYTE ESTERASE UR QL STRIP: ABNORMAL
MUCOUS THREADS #/AREA URNS LPF: PRESENT /LPF
NITRATE UR QL: POSITIVE
PH UR STRIP: 5 [PH] (ref 5–7)
RBC URINE: 17 /HPF
SARS-COV-2 RNA RESP QL NAA+PROBE: NEGATIVE
SP GR UR STRIP: 1.01 (ref 1–1.03)
UROBILINOGEN UR STRIP-MCNC: NORMAL MG/DL
WBC CLUMPS #/AREA URNS HPF: PRESENT /HPF
WBC URINE: >182 /HPF

## 2021-07-20 PROCEDURE — 81001 URINALYSIS AUTO W/SCOPE: CPT | Performed by: PATHOLOGY

## 2021-07-20 PROCEDURE — U0005 INFEC AGEN DETEC AMPLI PROBE: HCPCS | Performed by: STUDENT IN AN ORGANIZED HEALTH CARE EDUCATION/TRAINING PROGRAM

## 2021-07-20 PROCEDURE — 99215 OFFICE O/P EST HI 40 MIN: CPT | Performed by: CLINICAL NURSE SPECIALIST

## 2021-07-20 PROCEDURE — 87086 URINE CULTURE/COLONY COUNT: CPT | Performed by: UROLOGY

## 2021-07-20 RX ORDER — LIDOCAINE 40 MG/G
CREAM TOPICAL
Status: DISCONTINUED | OUTPATIENT
Start: 2021-07-20 | End: 2021-07-20 | Stop reason: HOSPADM

## 2021-07-20 RX ORDER — SULFAMETHOXAZOLE/TRIMETHOPRIM 800-160 MG
1 TABLET ORAL 2 TIMES DAILY
Qty: 6 TABLET | Refills: 0 | Status: SHIPPED | OUTPATIENT
Start: 2021-07-20 | End: 2021-07-23

## 2021-07-20 RX ORDER — ALBUTEROL SULFATE 0.83 MG/ML
2.5 SOLUTION RESPIRATORY (INHALATION) ONCE
Status: CANCELLED | OUTPATIENT
Start: 2021-07-20 | End: 2021-07-20

## 2021-07-20 RX ORDER — SODIUM CHLORIDE, SODIUM LACTATE, POTASSIUM CHLORIDE, CALCIUM CHLORIDE 600; 310; 30; 20 MG/100ML; MG/100ML; MG/100ML; MG/100ML
INJECTION, SOLUTION INTRAVENOUS CONTINUOUS
Status: CANCELLED | OUTPATIENT
Start: 2021-07-20

## 2021-07-20 RX ORDER — MONTELUKAST SODIUM 10 MG/1
10 TABLET ORAL AT BEDTIME
COMMUNITY

## 2021-07-20 RX ORDER — METHOCARBAMOL 500 MG/1
500 TABLET, FILM COATED ORAL 4 TIMES DAILY
COMMUNITY

## 2021-07-20 ASSESSMENT — COPD QUESTIONNAIRES
CAT_SEVERITY: MODERATE
COPD: 1

## 2021-07-20 ASSESSMENT — MIFFLIN-ST. JEOR: SCORE: 692.34

## 2021-07-20 ASSESSMENT — LIFESTYLE VARIABLES: TOBACCO_USE: 1

## 2021-07-20 ASSESSMENT — ENCOUNTER SYMPTOMS: SEIZURES: 1

## 2021-07-20 ASSESSMENT — PAIN SCALES - GENERAL: PAINLEVEL: EXTREME PAIN (8)

## 2021-07-20 NOTE — ANESTHESIA PREPROCEDURE EVALUATION
Anesthesia Pre-Procedure Evaluation    Patient: Sonya Foote   MRN: [id number] : 1950        Preoperative Diagnosis: * No surgery found *   Procedure :      Past Medical History:   Diagnosis Date     Acid reflux disease      Benign essential hypertension      Blind left eye     due to central retinal artery occlusion     CAD (coronary artery disease)     UA and inferior MI in 2016 s/p PCI     Chest pain on breathing 06/10/2020     Chronic back pain      Chronic neck pain      Chronic pain syndrome     with fentanyl intrathecal pain pump     Complex sleep apnea syndrome      COPD (chronic obstructive pulmonary disease) (H)      Dysphagia     chronic due to esophageal strictures and multiple previous dilitations      ROGER (generalized anxiety disorder)      History of blood transfusion     x5; no adverse reactions     History of central retinal artery occlusion     left-sided 2006     History of stroke     residual left-sided weakness     Hx of carotid artery stenosis     s/p left carotid stent in  and balloon angioplasty in 2016     MDD (major depressive disorder)      Neurogenic bladder     SPT in place     JOSE (obstructive sleep apnea)      Osteoporosis      PAD (peripheral artery disease) (H)      Peripheral neuropathy      Person who has had sex change operation     male to female     Seizure disorder (H)     many years since last grand mal; daily, brief petit mals     Sickle cell trait (H)      Type 2 diabetes mellitus (H)      Urge incontinence of urine       Past Surgical History:   Procedure Laterality Date     AMPUTATE LEG ABOVE KNEE Left 2016    Procedure: AMPUTATE LEG ABOVE KNEE;  Surgeon: Mello Rodriguez MD;  [facility]     AMPUTATE LEG BELOW KNEE Right 2016    Procedure: AMPUTATE LEG BELOW KNEE;  Surgeon: Savannah Durant MD;  [facility]     AMPUTATE REVISION STUMP LOWER EXTREMITY Right 2016    Procedure: AMPUTATE REVISION STUMP LOWER EXTREMITY;  Surgeon: Carleen  MD Savannah;  [facility]     AMPUTATE REVISION STUMP LOWER EXTREMITY Right 11/16/2016    Procedure: AMPUTATE REVISION STUMP LOWER EXTREMITY;  Surgeon: Savannah Durant MD;  [facility]     AMPUTATE TOE(S) Right 1/5/2016    Procedure: AMPUTATE TOE(S);  Surgeon: Mello Gaines DPM;  Location: [facility]     ANGIOGRAM Bilateral 11/21/2014    Procedure: ANGIOGRAM;  Surgeon: Savannah Durant MD;  [facility]     ANGIOGRAM Left 1/16/2015    Procedure: ANGIOGRAM;  Surgeon: Savannah Durant MD;  [facility]     ANGIOGRAM Bilateral 9/14/2015    Procedure: ANGIOGRAM;  Surgeon: Savannah Durant MD;  [facility]     ANGIOGRAM Left 10/12/2015    Procedure: ANGIOGRAM;  Surgeon: Savannah Durant MD;  [facility]     ANGIOGRAM Right 6/6/2016    Procedure: ANGIOGRAM;  Surgeon: Savannah Durant MD;  [facility]     ANGIOPLASTY Right 6/6/2016    Procedure: ANGIOPLASTY;  Surgeon: Savannah Durant MD;  [facility]     APPENDECTOMY       BREAST BIOPSY, RT/LT Right     benign     CATARACT IOL, RT/LT Right      CHOLECYSTECTOMY       COLONOSCOPY N/A 8/25/2014    Procedure: COLONOSCOPY;  Surgeon: Mello Ferrer MD;  [facility]     COLONOSCOPY WITH CO2 INSUFFLATION N/A 8/20/2014    Procedure: COLONOSCOPY WITH CO2 INSUFFLATION;  Surgeon: Duane, William Charles, MD;  Location: [facility]     CYSTOSCOPY, INJECT BOTOX N/A 5/21/2020    Procedure: CYSTOSCOPY, WITH BOTULINUM TOXIN INJECTION;  Surgeon: Loki Gordon MD;  [facility]     CYSTOSCOPY, INJECT BOTOX N/A 10/8/2020    Procedure: CYSTOSCOPY, INJECT BOTOX INTO BLADDER, SUPRAPUBIC TUBE EXCHNAGE;  Surgeon: Loki Gordon MD;  [facility]     CYSTOSCOPY, INJECT BOTOX N/A 1/7/2021    Procedure: CYSTOSCOPY, WITH BOTULINUM TOXIN INJECTION;  Surgeon: Loki Gordon MD;  [facility]     CYSTOSCOPY, INTRAVESICAL INJECTION N/A 10/31/2019    Procedure: CYSTOSCOPY, BOTOX INJECTION, Suprapubic Catheter Exchange;  Surgeon: Loki Gordon MD;   [facility]     CYSTOSTOMY, INSERT TUBE SUPRAPUBIC, COMBINED N/A 1/16/2018    Procedure: COMBINED CYSTOSTOMY, INSERT TUBE SUPRAPUBIC;  Cystoscopy, Intraoperative Ultrasound, Suprapubic Tube Placement;  Surgeon: Keanu Dawson MD;  [facility]     ENDARTERECTOMY FEMORAL  5/23/2014    Procedure: ENDARTERECTOMY FEMORAL;  Surgeon: Jason Joshi MD;  [facility]     ESOPHAGOSCOPY, GASTROSCOPY, DUODENOSCOPY (EGD), COMBINED  12/14/2012    Procedure: COMBINED ESOPHAGOSCOPY, GASTROSCOPY, DUODENOSCOPY (EGD), BIOPSY SINGLE OR MULTIPLE;  ESOPHAGOSCOPY, GASTROSCOPY, DUODENOSCOPY (EGD), DILATATION ;  Surgeon: Elizabeth Stevenson MD;  Location:  GI     ESOPHAGOSCOPY, GASTROSCOPY, DUODENOSCOPY (EGD), COMBINED  12/31/2013    Procedure: COMBINED ESOPHAGOSCOPY, GASTROSCOPY, DUODENOSCOPY (EGD), BIOPSY SINGLE OR MULTIPLE;;  Surgeon: Clemente Lopez MD;  [facility]     ESOPHAGOSCOPY, GASTROSCOPY, DUODENOSCOPY (EGD), COMBINED  4/1/2014    Procedure: COMBINED ESOPHAGOSCOPY, GASTROSCOPY, DUODENOSCOPY (EGD);;  Surgeon: Clemente Lopez MD;  [facility]     ESOPHAGOSCOPY, GASTROSCOPY, DUODENOSCOPY (EGD), COMBINED  6/28/2014    Procedure: COMBINED ESOPHAGOSCOPY, GASTROSCOPY, DUODENOSCOPY (EGD);  Surgeon: Clemente Lopez MD;  [facility]     ESOPHAGOSCOPY, GASTROSCOPY, DUODENOSCOPY (EGD), COMBINED N/A 8/20/2014    Procedure: COMBINED ESOPHAGOSCOPY, GASTROSCOPY, DUODENOSCOPY (EGD), BIOPSY SINGLE OR MULTIPLE;  Surgeon: Duane, William Charles, MD;  Location:  OR     ESOPHAGOSCOPY, GASTROSCOPY, DUODENOSCOPY (EGD), COMBINED N/A 8/22/2014    Procedure: COMBINED ESOPHAGOSCOPY, GASTROSCOPY, DUODENOSCOPY (EGD), BIOPSY SINGLE OR MULTIPLE;  Surgeon: Mello Ferrer MD;  [facility]     ESOPHAGOSCOPY, GASTROSCOPY, DUODENOSCOPY (EGD), COMBINED N/A 10/2/2014    Procedure: COMBINED ESOPHAGOSCOPY, GASTROSCOPY, DUODENOSCOPY (EGD), BIOPSY SINGLE OR MULTIPLE;  Surgeon: Remy Haskins MD;  Location:  GI      ESOPHAGOSCOPY, GASTROSCOPY, DUODENOSCOPY (EGD), COMBINED Left 12/15/2014    Procedure: COMBINED ESOPHAGOSCOPY, GASTROSCOPY, DUODENOSCOPY (EGD), BIOPSY SINGLE OR MULTIPLE;  Surgeon: Remy Haskins MD;  [facility]     ESOPHAGOSCOPY, GASTROSCOPY, DUODENOSCOPY (EGD), COMBINED N/A 2/25/2015    Procedure: COMBINED ENDOSCOPIC ULTRASOUND, ESOPHAGOSCOPY, GASTROSCOPY, DUODENOSCOPY (EGD), FINE NEEDLE ASPIRATE/BIOPSY;  Surgeon: Clemente Lugo MD;  [facility]     ESOPHAGOSCOPY, GASTROSCOPY, DUODENOSCOPY (EGD), COMBINED Left 2/25/2015    Procedure: COMBINED ESOPHAGOSCOPY, GASTROSCOPY, DUODENOSCOPY (EGD), BIOPSY SINGLE OR MULTIPLE;  Surgeon: Clemente Lugo MD;  [facility]     ESOPHAGOSCOPY, GASTROSCOPY, DUODENOSCOPY (EGD), COMBINED N/A 9/25/2016    Procedure: COMBINED ESOPHAGOSCOPY, GASTROSCOPY, DUODENOSCOPY (EGD);  Surgeon: Aziza Patiño MD;  [facility]     ESOPHAGOSCOPY, GASTROSCOPY, DUODENOSCOPY (EGD), COMBINED N/A 1/18/2017    Procedure: COMBINED ESOPHAGOSCOPY, GASTROSCOPY, DUODENOSCOPY (EGD), BIOPSY SINGLE OR MULTIPLE;  Surgeon: Clemente Lopez MD;  [facility]     ESOPHAGOSCOPY, GASTROSCOPY, DUODENOSCOPY (EGD), COMBINED N/A 11/26/2017    Procedure: COMBINED ESOPHAGOSCOPY, GASTROSCOPY, DUODENOSCOPY (EGD), REMOVE FOREIGN BODY;  Esophagogastroduodenoscopy with foreign body extraction  ;  Surgeon: Herberth Castrejon MD;  [facility]     ESOPHAGOSCOPY, GASTROSCOPY, DUODENOSCOPY (EGD), COMBINED N/A 11/26/2017    Procedure: COMBINED ESOPHAGOSCOPY, GASTROSCOPY, DUODENOSCOPY (EGD), REMOVE FOREIGN BODY;;  Surgeon: Herberth Castrejon MD;  [facility]     ESOPHAGOSCOPY, GASTROSCOPY, DUODENOSCOPY (EGD), COMBINED N/A 4/10/2020    Procedure: ESOPHAGOGASTRODUODENOSCOPY (EGD);  Surgeon: Yael Cabral MD;  [facility]     FASCIOTOMY LOWER EXTREMITY Left 6/10/2016    Procedure: FASCIOTOMY LOWER EXTREMITY;  Surgeon: Mello Rodriguez MD;  [facility]      CAPSULE  ENDOSCOPY N/A 8/25/2014    Procedure: CAPSULE/PILL CAM ENDOSCOPY;  Surgeon: Remy Haskins MD;  [facility]     HC CAPSULE ENDOSCOPY N/A 10/2/2014    Procedure: CAPSULE/PILL CAM ENDOSCOPY;  Surgeon: Remy Haskins MD;  [facility]     INJECT BLOCK MEDIAL BRANCH CERVICAL/THORACIC/LUMBAR Left 10/30/2020    Procedure: Cervical 3, 4, and 5 Medial Branch Block;  Surgeon: Evelyn Lima MD;  Location: [facility]     ORTHOPEDIC SURGERY      broken wrist repair     REVISE CATHETER INTRATHECAL  08/24/2020     SEX TRANSFORMATION SURGERY, MALE TO FEMALE  1974 1974     SINUS SURGERY      cyst removed     TONSILLECTOMY       VASCULAR SURGERY  2006    Left carotid stent      Allergies   Allergen Reactions     Bee Venom Anaphylaxis     Penicillins Anaphylaxis     Dilantin [Phenytoin] Other (See Comments)     Generic dilantin only per pt     Iodine Hives     Novocaine [Procaine] Hives     Tositumomab Unknown      Social History     Tobacco Use     Smoking status: Current Every Day Smoker     Packs/day: 1.00     Years: 30.00     Pack years: 30.00     Types: Cigarettes, Cigars     Smokeless tobacco: Never Used     Tobacco comment: 1.5 packs per day    Substance Use Topics     Alcohol use: No     Alcohol/week: 0.0 standard drinks      Wt Readings from Last 1 Encounters:   07/06/21 53.1 kg (117 lb)        Anesthesia Evaluation   Pt has had prior anesthetic. Type: General and MAC.    No history of anesthetic complications       ROS/MED HX  ENT/Pulmonary: Comment: 02 2 liters with CPAP at night  Smokes 10 cigs daily    (+) sleep apnea, uses CPAP, allergic rhinitis, vocal cord abnormalities -  Hoarseness, tobacco use, Current use, Moderate Persistent, asthma Treatment: Inhaled steroids, Inhaler prn and Nebulizer daily,  moderate,  COPD, O2 dependent, during Nighttime, 2 liters/min,  (-) recent URI   Neurologic: Comment: [place]  [facility]    (+) peripheral neuropathy, - hands. seizures, features: 4-5 petit mal seizures  daily, CVA, date: 05, 07, 08, with deficits, - left side weakness, peripheral vision loss, dysphagia.     Cardiovascular: Comment: PVD and thrombosis of LLE, s/p left AKA 2016, PVD RLE, s/p right AKA 2016, left carotid stent  MI 2005, inferior MI 2016  History of brief episode of chest pain 1 month ago with NTG    (+) Dyslipidemia hypertension-Peripheral Vascular Disease-- Carotid Stenosis. CAD -past MI -stent-9/2016. 2 Drug Eluting Stent. Taking blood thinners Pt has received instructions: Instructions Given to patient: Will remain on aspirin up to DOS. CHF etiology: diastolic Last EF: 60-65% date: 12/26/20 Previous cardiac testing   Echo: Date: 12/26/20 Results:    Stress Test: Date: 2017 Results:    ECG Reviewed: Date: 4/27/21 Results:  SB  Cath:  Date: 2016 Results:      METS/Exercise Tolerance: 1 - Eating, dressing Comment: Patient lives in assisted living.    Hematologic: Comments: Left LE blood clot prior to AKA  Sickle cell trait    (+) History of blood clots, pt is not anticoagulated, anemia, history of blood transfusion,     Musculoskeletal: Comment: Neck pain   DDD, chronic low back pain, intrathecal pump  (+) arthritis,     GI/Hepatic: Comment: Chronic dysphagia. Previous strictures with dilations.  Eats pureed diet, takes meds with yogurt.  History appy, jessy    (+) GERD, Symptomatic, appendicitis, cholecystitis/cholelithiasis,     Renal/Genitourinary: Comment: Functional incontinence  Suprapubic catheter  Recurrent UTIs      Endo:     (+) type II DM, Last HgA1c: 5.1, date: 12/26/20, Not using insulin, - not using insulin pump. Normal glucose range: 98, Diabetic complications: neuropathy gastroparesis cardiac problems.     Psychiatric/Substance Use:     (+) psychiatric history schizophrenia, anxiety and depression     Infectious Disease:  - neg infectious disease ROS     Malignancy:  - neg malignancy ROS     Other:      (+) , H/O Chronic Pain,other significant disability Wheelchair bound and Blind  (Cannot see with left eye, limited vision right, no peripheral vision, legally blind ),          Physical Exam    Airway        Mallampati: I   TM distance: > 3 FB   Neck ROM: limited   Mouth opening: > 3 cm    Respiratory Devices and Support         Dental  no notable dental history   Comment: adentulous        Cardiovascular          Rhythm and rate: regular and normal     Pulmonary           breath sounds clear to auscultation   (+) decreased breath sounds           OUTSIDE LABS:  CBC:   Lab Results   Component Value Date    WBC 10.8 07/06/2021    WBC 7.9 04/27/2021    HGB 9.9 (L) 07/06/2021    HGB 12.6 04/27/2021    HCT 30.1 (L) 07/06/2021    HCT 36.6 04/27/2021     07/06/2021     04/27/2021     BMP:   Lab Results   Component Value Date     07/06/2021     04/27/2021    POTASSIUM 3.7 07/06/2021    POTASSIUM 3.9 04/27/2021    CHLORIDE 105 07/06/2021    CHLORIDE 105 04/27/2021    CO2 29 07/06/2021    CO2 30 04/27/2021    BUN 19 07/06/2021    BUN 8 04/27/2021    CR 0.83 07/06/2021    CR 0.60 04/27/2021     (H) 07/06/2021    GLC 89 04/27/2021     COAGS:   Lab Results   Component Value Date    PTT 27 06/11/2020    INR 1.09 12/26/2020    FIBR 497 (H) 06/11/2020     POC:   Lab Results   Component Value Date    BGM 86 01/07/2021     HEPATIC:   Lab Results   Component Value Date    ALBUMIN 2.7 (L) 07/06/2021    PROTTOTAL 7.4 07/06/2021    ALT 40 07/06/2021    AST 34 07/06/2021    ALKPHOS 177 (H) 07/06/2021    BILITOTAL <0.1 (L) 07/06/2021    ALEX 32 08/02/2016     OTHER:   Lab Results   Component Value Date    PH 7.40 09/14/2015    LACT 2.0 07/06/2021    A1C 5.1 12/26/2020    CAROL 8.7 07/06/2021    PHOS 3.9 12/26/2020    MAG 2.1 12/26/2020    LIPASE 129 07/04/2018    TSH 0.95 12/26/2020    T4 0.88 05/17/2013    CRP 1.7 (H) 04/27/2021    SED 72 (H) 12/01/2015       Anesthesia Plan    ASA Status:  4   NPO Status:  NPO Appropriate    Anesthesia Type: MAC.     - Reason for MAC: chronic  cardiopulmonary disease, straight local not clinically adequate   Induction: Intravenous.   Maintenance: TIVA.        Consents    Anesthesia Plan(s) and associated risks, benefits, and realistic alternatives discussed. Questions answered and patient/representative(s) expressed understanding.     - Discussed with:  Patient      - Extended Intubation/Ventilatory Support Discussed: No.      - Patient is DNR/DNI Status: No    Use of blood products discussed: No .     Postoperative Care    Pain management: Multi-modal analgesia, IV analgesics, Oral pain medications.   PONV prophylaxis: Background Propofol Infusion     Comments:              PAC Discussion and Assessment    ASA Classification: 3  Case is suitable for: [place]  Anesthetic techniques and relevant risks discussed: MAC with GA as backup  Invasive monitoring and risk discussed: No    Possibility and Risk of blood transfusion discussed: No            PAC Resident/NP Anesthesia Assessment: Sonya Foote is a 71 year old female scheduled to undergo CYSTOSCOPY, INJECT BOTOX INTO BLADDER with Dr. Gordon on 7/22/21. She has the following specific operative considerations:   - RCRI : 11% risk of major adverse cardiac event.   - VTE risk: 0.5%  - Risk of PONV score = 2.  If > 2, anti-emetic intervention recommended.    --Functional incontinence, overactive bladder, and recurrent urinary tract infections, managed by suprapubic catheter. She is followed by Dr. Gordon and is s/p Botox injections on 1/7/21 without difficulty. Above repeat procedure planned with MAC. Patient comfortable with plan.   --No history of problems with anesthesia.  --Functional status- METS 1.  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 CAD, s/p inferior MI with COLLEEN to pRCA and mRCA 9/17/2016. Last use of NTG was one month ago for a brief episode of chest pain upper left chest. On ASA 81 mg daily. In the past has been instructed to continue her aspirin with no interruption prior to  surgery. We have recommended that she hold up to DOS. Surgery team notified. Hypertension is managed with lisinopril but BP in low range 87/50s. Will hold on DOS. Heart failure - EF 35-40% (12/2016)-->Most recent echo 12/2020 EF 60-65%. Will take metoprolol on DOS. PVD- s/p AKA left 6/2016 and right 11/2016. Status post stent to left carotid. Carotid US and all testing above.   --Airway feasible. Has known sleep apnea and uses CPAP with 2 liters supplemental O2 nocturnally. Is using her inhalers and nebulizers as ordered. Smoking 10 cigarettes daily. No cough. Lungs CTA but diminished. Is bothered by the recent humidity. Albuterol neb ordered for DOS. Final decision by Anesthesia on DOS.  --GERD is managed with Protonix and will take on DOS. Has chronic history of dysphagia and strictures. Has had multiple EGD procedures with dilation, none recent. Has known gastroparesis.  --Sickle cell trait.  Taking iron for iron deficiency anemia.  Last Hgb was 9.9.  --Diabetes is managed with metformin.  Hold metformin DOS. A1c 5.1.   --Seizure disorder - taking medications as ordered, but reports 4-5 petit mal seizures per day. Last grand mal in 2005.  +poor memory. History of CVA x 3. Residual loss of peripheral vision, left side weakness, dysphagia. BPPV. Peripheral neuropathy in hands.     --Chronic back pain. Has an intrathecal pump, scanned by Pharm D today. Was filled last week. Consider cautious positioning due to chronic pain and bilateral AKA. Will require careful transfers.    --Depression, anxiety and schizoaffective disorder. Will take AM meds.    --Legally blind, but notes she can see some shadows. Glaucoma.   --[place].        Patient was discussed with Dr Wyatt. Will proceed.    Reviewed and Signed by PAC Mid-Level Provider/Resident  Mid-Level Provider/Resident: VICTOR M Shearer, CNS  Date: 7/20/21  Time: 12:00pm                               VICTOR M Lepe CNS

## 2021-07-20 NOTE — PROGRESS NOTES
[facility] Medication History Note    Medication history completed on July 20, 2021 by this writer. See Epic admission navigator for prior to admission medications. Operating room staff will still need to confirm medications and last dose information on day of surgery.     Medication history interview sources  Patient interview: No  Care Everywhere records: No  Surescripts pharmacy refill records: No  Other (if applicable): MAR from the [facility]     Changes made to PTA medication list (reason)  Added:   -- montelukast  -- methocarbamol    Deleted:   -- vit D = duplicate  -- menthol  -- mupirocin  -- MVI   -- gabapentin     Changed:   -- IT pump settings  -- lyrica directions    Additional medication history information (including reliability of information, actions taken by pharmacist):    -- No recent (within 30 days) course of antibiotics  -- No recent (within 30 days) course of systemic steroids  -- Patient declines being on any other prescription or over-the-counter medications  -- patient taking both advairs. 500 dose at 5Am and 8PM and 250 dose at 8Am and 4PM    Prior to Admission medications    Medication Sig Last Dose Taking? Auth Provider   ACETAMINOPHEN EXTRA STRENGTH 500 MG tablet TAKE 1 TABLET BY MOUTH EVERY 6 HOURS AS NEEDED FOR PAIN / FEVER Taking Yes Allan Casey MD   ADVAIR DISKUS 250-50 MCG/DOSE inhaler INHALE 1 PUFF BY MOUTH INTO THE LUNGS TWICE DAILY Taking Yes Allan Casey MD   albuterol (PROVENTIL) (2.5 MG/3ML) 0.083% neb solution Take 1 vial (2.5 mg) by nebulization every 6 hours as needed for shortness of breath / dyspnea or wheezing Taking Yes Allan Casey MD   alendronate (FOSAMAX) 70 MG tablet TAKE ONE TABLET BY MOUTH EVERY 7 DAYS (TAKE WITH 8OZ OF WATER 30 MINUTES BEFORE BREAKFAST AND REMAIN UPRIGHT DURING THIS TIME)  Patient taking differently: TAKE ONE TABLET BY MOUTH EVERY 7 DAYS (TAKE WITH 8OZ OF WATER 30 MINUTES BEFORE BREAKFAST AND REMAIN UPRIGHT  DURING THIS TIME)  Takes on Mondays Taking Yes Allan Casey MD   ASPERCREME LIDOCAINE 4 % Patch APPLY 1 PATCH TOPICALLY TO LOWER BACK ONCE DAILY (ON FOR 12 HOURS, OFF FOR 12 HOURS) Taking Yes Allan Casey MD   ASPIRIN LOW DOSE 81 MG chewable tablet CHEW AND SWALLOW 1 TABLET BY MOUTH EVERY MORNING Taking Yes Allan Casey MD   atorvastatin (LIPITOR) 40 MG tablet TAKE 1 TABLET BY MOUTH AT BEDTIME Taking Yes Allan Casey MD   brimonidine (ALPHAGAN) 0.2 % ophthalmic solution Place 1 drop into the right eye 2 times daily Taking Yes Mel Burnett MD   diclofenac (VOLTAREN) 1 % topical gel APPLY 2 GRAMS TOPICALLY TO AFFECTED AREA(S) THREE TIMES A DAY  Patient taking differently: Apply 2 g topically 4 times daily  Taking Yes Allan Casey MD   dorzolamide (TRUSOPT) 2 % ophthalmic solution Place 1 drop into the right eye 2 times daily Taking Yes Grisel Thurman MD   escitalopram (LEXAPRO) 10 MG tablet TAKE 1 TABLET BY MOUTH ONCE DAILY Taking Yes Allan Casey MD   FEROSUL 325 (65 Fe) MG tablet TAKE 1 TABLET BY MOUTH TWICE DAILY WITH MEALS Taking Yes Allan Casey MD   fluticasone (FLONASE) 50 MCG/ACT nasal spray SPRAY 2 SPRAYS IN EACH NOSTRIL DAILY Taking Yes Allan Casey MD   fluticasone-salmeterol (ADVAIR) 500-50 MCG/DOSE inhaler Inhale 1 puff into the lungs 2 times daily Taking Yes Rl Barroso MD   INCRUSE ELLIPTA 62.5 MCG/INH Inhaler INHALE 1 PUFF BY MOUTH ONCE DAILY Taking Yes Allan Casey MD   latanoprost (XALATAN) 0.005 % ophthalmic solution INSTILL ONE DROP IN EACH EYE AT BEDTIME Taking Yes Allan Casey MD   levETIRAcetam (KEPPRA) 500 MG tablet TAKE 1 TABLET BY MOUTH TWICE DAILY Taking Yes Allan Casey MD   lisinopril (ZESTRIL) 20 MG tablet TAKE 1 TABLET BY MOUTH EVERY MORNING Taking Yes Allan Casey MD   loratadine (CLARITIN) 10 MG tablet TAKE 1 TABLET BY MOUTH ONCE DAILY Taking Yes Allan Casey MD   Medication given by intrathecal pump  continuous prn Pump interrogated 7/20/21  Drug # 1: Fentanyl (Sublimaze).    Conc:2000  mcg/mL  Total Dose / 24 hours: 600  mcg  Drug # 2: Bupivacaine (Marcaine) .    Conc:20  mg/mL  Total Dose / 24 hours: 6  mg  Drug # 3: Morphine (Duramorph or Infumorph) .    Conc:9.1  mg/mL  Total Dose / 24 hours: 1.11  mg   24 hour dose with simple continuous infusion rate of 34.4mcg/hr   PTM: 13 max per 24 hour. Each bolus will provide 75mcg  Refill pump before 8/6/21  Pump Volume capacity 40ml  Current volume 16.1ml Taking Yes Unknown, Entered By History   metFORMIN (GLUCOPHAGE) 500 MG tablet TAKE 1 TABLET BY MOUTH EVERY EVENING WITH DINNER Taking Yes Allan Casey MD   methocarbamol (ROBAXIN) 500 MG tablet Take 500 mg by mouth 4 times daily Taking Yes Unknown, Entered By History   metoprolol succinate ER (TOPROL-XL) 50 MG 24 hr tablet TAKE 1 TABLET BY MOUTH EVERY MORNING Taking Yes Allan Casey MD   montelukast (SINGULAIR) 10 MG tablet Take 10 mg by mouth At Bedtime Taking Yes Unknown, Entered By History   nicotine (NICODERM CQ) 14 MG/24HR 24 hr patch APPLY 1 PATCH TOPICALLY DAILY ( EVERY 24 HOURS ) Taking Yes Allan Casey MD   pantoprazole (PROTONIX) 40 MG EC tablet TAKE 1 TABLET BY MOUTH EVERY MORNING Taking Yes Allan Casey MD   phenytoin (DILANTIN) 100 MG capsule TAKE 2 CAPS (200MG) BY MOUTH TWICE A DAY Taking Yes Allan Casey MD   polyethylene glycol (MIRALAX) 17 GM/SCOOP powder MIX 17GM OF POWDER IN 8OZ OF WATER UNTIL COMPLETELY DISSOLVED. DRINK SOLUTION BY MOUTH IN THE MORNING Taking Yes Allan Casey MD   pregabalin (LYRICA) 150 MG capsule TAKE 1 CAPSULE BY MOUTH THREE TIMES DAILY  Patient taking differently: Take 150 mg by mouth 4 times daily  Taking Yes Allan Casey MD   risperiDONE (RISPERDAL) 0.5 MG tablet TAKE 1 TABLET BY MOUTH AT BEDTIME Taking Yes Allan Casey MD   SENEXON-S 8.6-50 MG tablet TAKE 1 TABLET BY MOUTH TWICE DAILY Taking Yes Allan Casey MD   solifenacin (VESICARE)  10 MG tablet TAKE 1 TABLET BY MOUTH EVERY MORNING Taking Yes Allan Casey MD   traZODone (DESYREL) 150 MG tablet TAKE 1 TABLET BY MOUTH AT BEDTIME Taking Yes Allan Casey MD   Vitamin D3 (VITAMIN D) 10 MCG (400 UNIT) tablet TAKE TWO TABLETS (800 UNITS) BY MOUTH ONCE DAILY Taking Yes Allan Casey MD   albuterol (PROAIR HFA/PROVENTIL HFA/VENTOLIN HFA) 108 (90 Base) MCG/ACT inhaler Inhale 2 puffs into the lungs every 6 hours  Patient not taking: Reported on 7/20/2021 Not Taking  Allan Casey MD   blood glucose monitoring (ONE TOUCH ULTRA 2) meter device kit Use to test blood sugars 3 times daily or as directed.   Allan Casey MD   blood glucose monitoring (ONE TOUCH ULTRA) test strip Use to test blood sugars 3 times daily or as directed.   Allan Casey MD   blood glucose monitoring (ONE TOUCH ULTRASOFT) lancets Use to test blood sugar 3 times daily or as directed.   Allan Casey MD   EPINEPHrine (ANY BX GENERIC EQUIV) 0.3 MG/0.3ML injection 2-pack INJECT 0.3 ML INTO THE MUSCLE AS NEEDED FOR ANAPHYLAXIS  Patient not taking: Reported on 7/20/2021 Not Taking  Allan Casey MD   lactulose (CHRONULAC) 10 GM/15ML solution TAKE 30ML (20MG) BY MOUTH AS NEEDED FOR CONSTIPATION  Patient not taking: Reported on 7/20/2021 Not Taking  Allan Casey MD   Multiple Vitamins-Minerals (TAB-A-MACY) TABS TAKE 1 TABLET BY MOUTH ONCE DAILY  Patient not taking: Reported on 7/20/2021 Not Taking  Allan Casey MD   nitroGLYcerin (NITROSTAT) 0.4 MG sublingual tablet For chest pain place 1 tablet under the tongue every 5 minutes for 3 doses. If symptoms persist 5 minutes after 1st dose call 911.   Allan Casey MD   Nutritional Supplements (ENSURE) LIQD DRINK ONE CAN / BOTTLE BY MOUTH TWICE DAILY WITH MEALS   Allan Casey MD   oxyCODONE (OXY-IR) 5 MG capsule Take 1-2 capsules by mouth every 4 hours as needed for severe pain  Patient not taking: Reported on 7/20/2021 Not Taking  Reported, Patient    spacer (OPTICHAMBER SANDRA) holding chamber spacer   Rl Barroso MD   sulfamethoxazole-trimethoprim (BACTRIM DS) 800-160 MG tablet Take 1 tablet by mouth 2 times daily for 3 days  Patient not taking: Reported on 7/20/2021 Not Taking  Loki Gordon MD       Medication history completed by: Otoniel Cruz [facility]

## 2021-07-20 NOTE — H&P
Pre-Operative H & P     CC:  Preoperative exam to assess for increased cardiopulmonary risk while undergoing surgery and anesthesia.    Date of Encounter: 7/20/2021  Primary Care Physician:  Allan Casey  Reason for visit: Urge incontinence of urine [N39.41]  Overactive bladder [N32.81]    HPI  Sonya Foote is a 71 year old female who presents for pre-operative H & P in preparation for CYSTOSCOPY, INJECT BOTOX INTO BLADDER with Dr. Gordon on 7/22/21 at [facility]. History is obtained from the patient and medical records.     Patient with complex medical history to include CAD, s/p inferior MI with COLLEEN to pRCA & mRCA 9/17/2016, systolic CHF, hypertension, hyperlipidemia, PVD, s/p bilateral AKA,  h/o LLE blood clot prior to amputation, sickle cell trait, iron deficiency anemia, h/o transfusion (hives associated with), JOSE, nocturnal hypoxemia, COPD, asthma, GERD, dysphagia with history of esophageal stricture s/p multiple dilations, gastroparesis, NIDDM, neuropathy; chronic low back pain (intrathecal pump in place), DDD, BPPV, seizure disorder, h/o CVA x 3, carotid stenosis s/p stent to left carotid; depression/anxiety, schizoaffective disorder, and 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 She has  complications of functional incontinence, overactive bladder, and recurrent urinary tract infections, managed by suprapubic catheter. She is followed by Dr. Gordon and is s/p Botox injections on 1/7/21 without difficulty. She was recommended to repeat procedure in six months and is now scheduled as above.     Today patient denies fever, cough, chest pain, or irregular HR. Her breathing is worsened by the recent humidity and she cannot stay outside very long. She continues her regimen of steroid inhaler, nebs and prn albuterol. She continues to wear her oxygen at night at 2 liters along with her CPAP. She reports one brief episode of chest pain a month ago for which she took NTG  and it resolved. She has been having neck pain and had an ED visit related to this.     When asked if she has had any changes in her health or medical conditions since her last Botox injection she reported the neck pain above and the concern for humidity affecting her breathing.     Past Medical History  Past Medical History:   Diagnosis Date     Acid reflux disease      Benign essential hypertension      Blind left eye     due to central retinal artery occlusion     CAD (coronary artery disease)     UA and inferior MI in 2016 s/p PCI     Chest pain on breathing 06/10/2020     Chronic back pain      Chronic neck pain      Chronic pain syndrome     with fentanyl intrathecal pain pump     Complex sleep apnea syndrome      COPD (chronic obstructive pulmonary disease) (H)      Dysphagia     chronic due to esophageal strictures and multiple previous dilitations      ROGER (generalized anxiety disorder)      History of blood transfusion     x5; no adverse reactions     History of central retinal artery occlusion     left-sided 2006     History of stroke     residual left-sided weakness     Hx of carotid artery stenosis     s/p left carotid stent in 2006 and balloon angioplasty in 2016     MDD (major depressive disorder)      Neurogenic bladder     SPT in place     JOSE (obstructive sleep apnea)      Osteoporosis      PAD (peripheral artery disease) (H)      Peripheral neuropathy      Person who has had sex change operation     male to female     Seizure disorder (H)     many years since last grand mal; daily, brief petit mals     Sickle cell trait (H)      Type 2 diabetes mellitus (H)      Urge incontinence of urine        Past Surgical History  Past Surgical History:   Procedure Laterality Date     AMPUTATE LEG ABOVE KNEE Left 6/11/2016    Procedure: AMPUTATE LEG ABOVE KNEE;  Surgeon: Mello Rodriguez MD;  [facility]     AMPUTATE LEG BELOW KNEE Right 11/7/2016    Procedure: AMPUTATE LEG BELOW KNEE;  Surgeon: Carleen  MD Savannah;  [facility]     AMPUTATE REVISION STUMP LOWER EXTREMITY Right 11/11/2016    Procedure: AMPUTATE REVISION STUMP LOWER EXTREMITY;  Surgeon: Savannah Durant MD;  [facility]     AMPUTATE REVISION STUMP LOWER EXTREMITY Right 11/16/2016    Procedure: AMPUTATE REVISION STUMP LOWER EXTREMITY;  Surgeon: Savannah Durant MD;  [facility]     AMPUTATE TOE(S) Right 1/5/2016    Procedure: AMPUTATE TOE(S);  Surgeon: Mello Gaines DPM;  Location: [facility]     ANGIOGRAM Bilateral 11/21/2014    Procedure: ANGIOGRAM;  Surgeon: Savannah Durant MD;  [facility]     ANGIOGRAM Left 1/16/2015    Procedure: ANGIOGRAM;  Surgeon: Savannah Durant MD;  [facility]     ANGIOGRAM Bilateral 9/14/2015    Procedure: ANGIOGRAM;  Surgeon: Savannah Durant MD;  [facility]     ANGIOGRAM Left 10/12/2015    Procedure: ANGIOGRAM;  Surgeon: Savannah Durant MD;  [facility]     ANGIOGRAM Right 6/6/2016    Procedure: ANGIOGRAM;  Surgeon: Savannah Durant MD;  [facility]     ANGIOPLASTY Right 6/6/2016    Procedure: ANGIOPLASTY;  Surgeon: Savannah Durant MD;  [facility]     APPENDECTOMY       BREAST BIOPSY, RT/LT Right     benign     CATARACT IOL, RT/LT Right      CHOLECYSTECTOMY       COLONOSCOPY N/A 8/25/2014    Procedure: COLONOSCOPY;  Surgeon: Mello Ferrer MD;  [facility]     COLONOSCOPY WITH CO2 INSUFFLATION N/A 8/20/2014    Procedure: COLONOSCOPY WITH CO2 INSUFFLATION;  Surgeon: Duane, William Charles, MD;  [facility]     CYSTOSCOPY, INJECT BOTOX N/A 5/21/2020    Procedure: CYSTOSCOPY, WITH BOTULINUM TOXIN INJECTION;  Surgeon: Loki Gordon MD;  [facility]     CYSTOSCOPY, INJECT BOTOX N/A 10/8/2020    Procedure: CYSTOSCOPY, INJECT BOTOX INTO BLADDER, SUPRAPUBIC TUBE EXCHNAGE;  Surgeon: Loki Gordon MD;  [facility]     CYSTOSCOPY, INJECT BOTOX N/A 1/7/2021    Procedure: CYSTOSCOPY, WITH BOTULINUM TOXIN INJECTION;  Surgeon: Loki Gordon MD;  [facility]      CYSTOSCOPY, INTRAVESICAL INJECTION N/A 10/31/2019    Procedure: CYSTOSCOPY, BOTOX INJECTION, Suprapubic Catheter Exchange;  Surgeon: Loki Gordon MD;  [facility]     CYSTOSTOMY, INSERT TUBE SUPRAPUBIC, COMBINED N/A 1/16/2018    Procedure: COMBINED CYSTOSTOMY, INSERT TUBE SUPRAPUBIC;  Cystoscopy, Intraoperative Ultrasound, Suprapubic Tube Placement;  Surgeon: Keanu Dawson MD;  [facility]     ENDARTERECTOMY FEMORAL  5/23/2014    Procedure: ENDARTERECTOMY FEMORAL;  Surgeon: Jason Joshi MD;  [facility]     ESOPHAGOSCOPY, GASTROSCOPY, DUODENOSCOPY (EGD), COMBINED  12/14/2012    Procedure: COMBINED ESOPHAGOSCOPY, GASTROSCOPY, DUODENOSCOPY (EGD), BIOPSY SINGLE OR MULTIPLE;  ESOPHAGOSCOPY, GASTROSCOPY, DUODENOSCOPY (EGD), DILATATION ;  Surgeon: Elizabeth Stevenson MD;  Location:  GI     ESOPHAGOSCOPY, GASTROSCOPY, DUODENOSCOPY (EGD), COMBINED  12/31/2013    Procedure: COMBINED ESOPHAGOSCOPY, GASTROSCOPY, DUODENOSCOPY (EGD), BIOPSY SINGLE OR MULTIPLE;;  Surgeon: Clemente Lopez MD;  [facility]     ESOPHAGOSCOPY, GASTROSCOPY, DUODENOSCOPY (EGD), COMBINED  4/1/2014    Procedure: COMBINED ESOPHAGOSCOPY, GASTROSCOPY, DUODENOSCOPY (EGD);;  Surgeon: Clemente Lopez MD;  [facility]     ESOPHAGOSCOPY, GASTROSCOPY, DUODENOSCOPY (EGD), COMBINED  6/28/2014    Procedure: COMBINED ESOPHAGOSCOPY, GASTROSCOPY, DUODENOSCOPY (EGD);  Surgeon: Clemente Lopez MD;  [facility]     ESOPHAGOSCOPY, GASTROSCOPY, DUODENOSCOPY (EGD), COMBINED N/A 8/20/2014    Procedure: COMBINED ESOPHAGOSCOPY, GASTROSCOPY, DUODENOSCOPY (EGD), BIOPSY SINGLE OR MULTIPLE;  Surgeon: Duane, William Charles, MD;  [facility]     ESOPHAGOSCOPY, GASTROSCOPY, DUODENOSCOPY (EGD), COMBINED N/A 8/22/2014    Procedure: COMBINED ESOPHAGOSCOPY, GASTROSCOPY, DUODENOSCOPY (EGD), BIOPSY SINGLE OR MULTIPLE;  Surgeon: Mello Ferrer MD;  Location:  GI     ESOPHAGOSCOPY, GASTROSCOPY, DUODENOSCOPY (EGD), COMBINED N/A  10/2/2014    Procedure: COMBINED ESOPHAGOSCOPY, GASTROSCOPY, DUODENOSCOPY (EGD), BIOPSY SINGLE OR MULTIPLE;  Surgeon: Remy Haskins MD;  [facility]     ESOPHAGOSCOPY, GASTROSCOPY, DUODENOSCOPY (EGD), COMBINED Left 12/15/2014    Procedure: COMBINED ESOPHAGOSCOPY, GASTROSCOPY, DUODENOSCOPY (EGD), BIOPSY SINGLE OR MULTIPLE;  Surgeon: Remy Haskins MD;  [facility]     ESOPHAGOSCOPY, GASTROSCOPY, DUODENOSCOPY (EGD), COMBINED N/A 2/25/2015    Procedure: COMBINED ENDOSCOPIC ULTRASOUND, ESOPHAGOSCOPY, GASTROSCOPY, DUODENOSCOPY (EGD), FINE NEEDLE ASPIRATE/BIOPSY;  Surgeon: Clemente Lugo MD;  [facility]     ESOPHAGOSCOPY, GASTROSCOPY, DUODENOSCOPY (EGD), COMBINED Left 2/25/2015    Procedure: COMBINED ESOPHAGOSCOPY, GASTROSCOPY, DUODENOSCOPY (EGD), BIOPSY SINGLE OR MULTIPLE;  Surgeon: Clemente Lugo MD;  [facility]     ESOPHAGOSCOPY, GASTROSCOPY, DUODENOSCOPY (EGD), COMBINED N/A 9/25/2016    Procedure: COMBINED ESOPHAGOSCOPY, GASTROSCOPY, DUODENOSCOPY (EGD);  Surgeon: Aziza Patiño MD;  [facility]     ESOPHAGOSCOPY, GASTROSCOPY, DUODENOSCOPY (EGD), COMBINED N/A 1/18/2017    Procedure: COMBINED ESOPHAGOSCOPY, GASTROSCOPY, DUODENOSCOPY (EGD), BIOPSY SINGLE OR MULTIPLE;  Surgeon: Clemente Lopez MD;  [facility]     ESOPHAGOSCOPY, GASTROSCOPY, DUODENOSCOPY (EGD), COMBINED N/A 11/26/2017    Procedure: COMBINED ESOPHAGOSCOPY, GASTROSCOPY, DUODENOSCOPY (EGD), REMOVE FOREIGN BODY;  Esophagogastroduodenoscopy with foreign body extraction  ;  Surgeon: Herberth Castrejon MD;  [facility]     ESOPHAGOSCOPY, GASTROSCOPY, DUODENOSCOPY (EGD), COMBINED N/A 11/26/2017    Procedure: COMBINED ESOPHAGOSCOPY, GASTROSCOPY, DUODENOSCOPY (EGD), REMOVE FOREIGN BODY;;  Surgeon: Herberth Castrejon MD;  Location:  GI     ESOPHAGOSCOPY, GASTROSCOPY, DUODENOSCOPY (EGD), COMBINED N/A 4/10/2020    Procedure: ESOPHAGOGASTRODUODENOSCOPY (EGD);  Surgeon: Yael Cabral MD;  Location:   OR     FASCIOTOMY LOWER EXTREMITY Left 6/10/2016    Procedure: FASCIOTOMY LOWER EXTREMITY;  Surgeon: Mello Rodriguez MD;  [facility]     HC CAPSULE ENDOSCOPY N/A 8/25/2014    Procedure: CAPSULE/PILL CAM ENDOSCOPY;  Surgeon: Remy Haskins MD;  [facility]     HC CAPSULE ENDOSCOPY N/A 10/2/2014    Procedure: CAPSULE/PILL CAM ENDOSCOPY;  Surgeon: Remy Haskins MD;  [facility]     INJECT BLOCK MEDIAL BRANCH CERVICAL/THORACIC/LUMBAR Left 10/30/2020    Procedure: Cervical 3, 4, and 5 Medial Branch Block;  Surgeon: Evelyn Lima MD;  Location: [facility] OR     ORTHOPEDIC SURGERY      broken wrist repair     REVISE CATHETER INTRATHECAL  08/24/2020     SEX TRANSFORMATION SURGERY, MALE TO FEMALE  1974 1974     SINUS SURGERY      cyst removed     TONSILLECTOMY       VASCULAR SURGERY  2006    Left carotid stent       Hx of Blood transfusions/reactions: Yes in past. Notes indicate history of hives reaction but patient denied.    Hx of abnormal bleeding or anti-platelet use: ASA 81 mg daily.     Menstrual history: No LMP recorded. (Menstrual status: Amenorrhea).    Steroid use in the last year: Unknown.     Personal or FH with difficulty with Anesthesia:  Denies.     Prior to Admission Medications  Current Outpatient Medications   Medication Sig Dispense Refill     ACETAMINOPHEN EXTRA STRENGTH 500 MG tablet TAKE 1 TABLET BY MOUTH EVERY 6 HOURS AS NEEDED FOR PAIN / FEVER 90 tablet 5     ADVAIR DISKUS 250-50 MCG/DOSE inhaler INHALE 1 PUFF BY MOUTH INTO THE LUNGS TWICE DAILY 60 each 1     albuterol (PROAIR HFA/PROVENTIL HFA/VENTOLIN HFA) 108 (90 Base) MCG/ACT inhaler Inhale 2 puffs into the lungs every 6 hours (Patient not taking: Reported on 7/20/2021) 8.5 g 5     albuterol (PROVENTIL) (2.5 MG/3ML) 0.083% neb solution Take 1 vial (2.5 mg) by nebulization every 6 hours as needed for shortness of breath / dyspnea or wheezing 180 mL 3     alendronate (FOSAMAX) 70 MG tablet TAKE ONE TABLET BY MOUTH  EVERY 7 DAYS (TAKE WITH 8OZ OF WATER 30 MINUTES BEFORE BREAKFAST AND REMAIN UPRIGHT DURING THIS TIME) (Patient taking differently: TAKE ONE TABLET BY MOUTH EVERY 7 DAYS (TAKE WITH 8OZ OF WATER 30 MINUTES BEFORE BREAKFAST AND REMAIN UPRIGHT DURING THIS TIME)  Takes on Mondays) 4 tablet 97     ASPERCREME LIDOCAINE 4 % Patch APPLY 1 PATCH TOPICALLY TO LOWER BACK ONCE DAILY (ON FOR 12 HOURS, OFF FOR 12 HOURS) 30 patch 10     ASPIRIN LOW DOSE 81 MG chewable tablet CHEW AND SWALLOW 1 TABLET BY MOUTH EVERY MORNING 28 tablet 11     atorvastatin (LIPITOR) 40 MG tablet TAKE 1 TABLET BY MOUTH AT BEDTIME 28 tablet 11     blood glucose monitoring (ONE TOUCH ULTRA 2) meter device kit Use to test blood sugars 3 times daily or as directed. 1 kit 0     blood glucose monitoring (ONE TOUCH ULTRA) test strip Use to test blood sugars 3 times daily or as directed. 3 Box 3     blood glucose monitoring (ONE TOUCH ULTRASOFT) lancets Use to test blood sugar 3 times daily or as directed. 100 each PRN     brimonidine (ALPHAGAN) 0.2 % ophthalmic solution Place 1 drop into the right eye 2 times daily 5 mL 5     diclofenac (VOLTAREN) 1 % topical gel APPLY 2 GRAMS TOPICALLY TO AFFECTED AREA(S) THREE TIMES A DAY (Patient taking differently: Apply 2 g topically 4 times daily ) 100 g 11     dorzolamide (TRUSOPT) 2 % ophthalmic solution Place 1 drop into the right eye 2 times daily 1 Bottle 1     EPINEPHrine (ANY BX GENERIC EQUIV) 0.3 MG/0.3ML injection 2-pack INJECT 0.3 ML INTO THE MUSCLE AS NEEDED FOR ANAPHYLAXIS (Patient not taking: Reported on 7/20/2021) 2 each 1     escitalopram (LEXAPRO) 10 MG tablet TAKE 1 TABLET BY MOUTH ONCE DAILY 28 tablet 11     FEROSUL 325 (65 Fe) MG tablet TAKE 1 TABLET BY MOUTH TWICE DAILY WITH MEALS 56 tablet 11     fluticasone (FLONASE) 50 MCG/ACT nasal spray SPRAY 2 SPRAYS IN EACH NOSTRIL DAILY 16 g 11     fluticasone-salmeterol (ADVAIR) 500-50 MCG/DOSE inhaler Inhale 1 puff into the lungs 2 times daily 1 each 11      INCRUSE ELLIPTA 62.5 MCG/INH Inhaler INHALE 1 PUFF BY MOUTH ONCE DAILY 30 each 10     lactulose (CHRONULAC) 10 GM/15ML solution TAKE 30ML (20MG) BY MOUTH AS NEEDED FOR CONSTIPATION (Patient not taking: Reported on 7/20/2021) 473 mL 1     latanoprost (XALATAN) 0.005 % ophthalmic solution INSTILL ONE DROP IN EACH EYE AT BEDTIME 2.5 mL 10     levETIRAcetam (KEPPRA) 500 MG tablet TAKE 1 TABLET BY MOUTH TWICE DAILY 56 tablet 11     lisinopril (ZESTRIL) 20 MG tablet TAKE 1 TABLET BY MOUTH EVERY MORNING 28 tablet 11     loratadine (CLARITIN) 10 MG tablet TAKE 1 TABLET BY MOUTH ONCE DAILY 28 tablet 11     Medication given by intrathecal pump continuous prn Pump interrogated 7/20/21  Drug # 1: Fentanyl (Sublimaze).    Conc:2000  mcg/mL  Total Dose / 24 hours: 600  mcg  Drug # 2: Bupivacaine (Marcaine) .    Conc:20  mg/mL  Total Dose / 24 hours: 6  mg  Drug # 3: Morphine (Duramorph or Infumorph) .    Conc:9.1  mg/mL  Total Dose / 24 hours: 1.11  mg   24 hour dose with simple continuous infusion rate of 34.4mcg/hr   PTM: 13 max per 24 hour. Each bolus will provide 75mcg  Refill pump before 8/6/21  Pump Volume capacity 40ml  Current volume 16.1ml       metFORMIN (GLUCOPHAGE) 500 MG tablet TAKE 1 TABLET BY MOUTH EVERY EVENING WITH DINNER 28 tablet 11     methocarbamol (ROBAXIN) 500 MG tablet Take 500 mg by mouth 4 times daily       metoprolol succinate ER (TOPROL-XL) 50 MG 24 hr tablet TAKE 1 TABLET BY MOUTH EVERY MORNING 28 tablet 11     montelukast (SINGULAIR) 10 MG tablet Take 10 mg by mouth At Bedtime       Multiple Vitamins-Minerals (TAB-A-MACY) TABS TAKE 1 TABLET BY MOUTH ONCE DAILY (Patient not taking: Reported on 7/20/2021) 28 tablet 11     nicotine (NICODERM CQ) 14 MG/24HR 24 hr patch APPLY 1 PATCH TOPICALLY DAILY ( EVERY 24 HOURS ) 28 patch 3     nitroGLYcerin (NITROSTAT) 0.4 MG sublingual tablet For chest pain place 1 tablet under the tongue every 5 minutes for 3 doses. If symptoms persist 5 minutes after 1st dose  call 911. 25 tablet 3     Nutritional Supplements (ENSURE) LIQD DRINK ONE CAN / BOTTLE BY MOUTH TWICE DAILY WITH MEALS 66167 mL 11     oxyCODONE (OXY-IR) 5 MG capsule Take 1-2 capsules by mouth every 4 hours as needed for severe pain (Patient not taking: Reported on 7/20/2021)       pantoprazole (PROTONIX) 40 MG EC tablet TAKE 1 TABLET BY MOUTH EVERY MORNING 28 tablet 11     phenytoin (DILANTIN) 100 MG capsule TAKE 2 CAPS (200MG) BY MOUTH TWICE A  capsule 11     polyethylene glycol (MIRALAX) 17 GM/SCOOP powder MIX 17GM OF POWDER IN 8OZ OF WATER UNTIL COMPLETELY DISSOLVED. DRINK SOLUTION BY MOUTH IN THE MORNING 510 g 8     pregabalin (LYRICA) 150 MG capsule TAKE 1 CAPSULE BY MOUTH THREE TIMES DAILY (Patient taking differently: Take 150 mg by mouth 4 times daily ) 90 capsule 4     risperiDONE (RISPERDAL) 0.5 MG tablet TAKE 1 TABLET BY MOUTH AT BEDTIME 28 tablet 11     SENEXON-S 8.6-50 MG tablet TAKE 1 TABLET BY MOUTH TWICE DAILY 56 tablet 11     solifenacin (VESICARE) 10 MG tablet TAKE 1 TABLET BY MOUTH EVERY MORNING 28 tablet 11     spacer (OPTICHAMBER SANDRA) holding chamber spacer 1 each 0     sulfamethoxazole-trimethoprim (BACTRIM DS) 800-160 MG tablet Take 1 tablet by mouth 2 times daily for 3 days (Patient not taking: Reported on 7/20/2021) 6 tablet 0     traZODone (DESYREL) 150 MG tablet TAKE 1 TABLET BY MOUTH AT BEDTIME 28 tablet 11     Vitamin D3 (VITAMIN D) 10 MCG (400 UNIT) tablet TAKE TWO TABLETS (800 UNITS) BY MOUTH ONCE DAILY 56 tablet 11       Allergies  Allergies   Allergen Reactions     Bee Venom Anaphylaxis     Penicillins Anaphylaxis     Dilantin [Phenytoin] Other (See Comments)     Generic dilantin only per pt     Iodine Hives     Novocaine [Procaine] Hives     Tositumomab Unknown       Social History  Social History     Socioeconomic History     Marital status:      Spouse name: Not on file     Number of children: 2     Years of education: Not on file     Highest education level:  Not on file   Occupational History     Occupation: Retired   Tobacco Use     Smoking status: Current Every Day Smoker     Packs/day: 1.00     Years: 30.00     Pack years: 30.00     Types: Cigarettes, Cigars     Smokeless tobacco: Never Used     Tobacco comment: 10 cigs daily   Substance and Sexual Activity     Alcohol use: No     Alcohol/week: 0.0 standard drinks     Drug use: No     Sexual activity: Not Currently   Other Topics Concern     Parent/sibling w/ CABG, MI or angioplasty before 65F 55M? Not Asked      Service Not Asked     Blood Transfusions Not Asked     Caffeine Concern No     Comment: 1 in the morning     Occupational Exposure Not Asked     Hobby Hazards Not Asked     Sleep Concern Not Asked     Stress Concern Not Asked     Weight Concern Not Asked     Special Diet Not Asked     Back Care Not Asked     Exercise Not Asked     Bike Helmet Not Asked     Seat Belt Not Asked     Self-Exams Not Asked   Social History Narrative    Living in a nursing home (as of 2020) for prosthetic fitting and therapy; usually resides in an [facility].    Two adopted children (Kam and Prashanth) and 3 grandchildren (as of 2020).     Social Determinants of Health     Financial Resource Strain:      Difficulty of Paying Living Expenses:    Food Insecurity:      Worried About Running Out of Food in the Last Year:      Ran Out of Food in the Last Year:    Transportation Needs:      Lack of Transportation (Medical):      Lack of Transportation (Non-Medical):    Physical Activity:      Days of Exercise per Week:      Minutes of Exercise per Session:    Stress:      Feeling of Stress :    Social Connections:      Frequency of Communication with Friends and Family:      Frequency of Social Gatherings with Friends and Family:      Attends Zoroastrian Services:      Active Member of Clubs or Organizations:      Attends Club or Organization Meetings:      Marital Status:    Intimate Partner Violence:      Fear of Current or Ex-Partner:       Emotionally Abused:      Physically Abused:      Sexually Abused:        Family History  Family History   Problem Relation Age of Onset     Dementia Mother      Diabetes Mother         type unknown     Coronary Artery Disease Mother         MI     Glaucoma Father      Diabetes Father         type unknown     Heart Failure Father      Schizophrenia Brother      Depression Brother      Suicide Sister      Depression Sister      Diabetes Sister      Ovarian Cancer Maternal Aunt      Leukemia Maternal Aunt      Diabetes Maternal Grandmother      Diabetes Maternal Grandfather      Diabetes Paternal Grandmother      Diabetes Paternal Grandfather      Breast Cancer Sister      Cerebrovascular Disease Brother      Colon Cancer No family hx of      Macular Degeneration No family hx of      ROS/MED HISTORY  The complete review of systems is negative other than noted in the HPI or here.    ENT/Pulmonary: Comment: 02 2 liters with CPAP at night  Smokes 10 cigs daily    (+) sleep apnea, uses CPAP, allergic rhinitis, vocal cord abnormalities -  Hoarseness, tobacco use, Current use, Moderate Persistent, asthma Treatment: Inhaled steroids, Inhaler prn and Nebulizer daily,  moderate,  COPD, O2 dependent, during Nighttime, 2 liters/min,  (-) recent URI   Neurologic: Comment: [place]  [facility]    (+) peripheral neuropathy, - hands. seizures, features: 4-5 petit mal seizures daily, CVA, date: 05, 07, 08, with deficits, - left side weakness, peripheral vision loss, dysphagia.     Cardiovascular: Comment: PVD and thrombosis of LLE, s/p left AKA 2016, PVD RLE, s/p right AKA 2016, left carotid stent  MI 2005, inferior MI 2016  History of brief episode of chest pain 1 month ago with NTG    (+) Dyslipidemia hypertension-Peripheral Vascular Disease-- Carotid Stenosis. CAD -past MI -stent-9/2016. 2 Drug Eluting Stent. Taking blood thinners Pt has received instructions: Instructions Given to patient: Will remain on aspirin up to DOS. CHF etiology:  diastolic Previous cardiac testing   Echo: Date: 12/26/20 Results:    Stress Test: Date: 2017 Results:    ECG Reviewed: Date: 4/27/21 Results:  SB  Cath:  Date: 2016 Results:      METS/Exercise Tolerance: 1 - Eating, dressing Comment: Patient lives in assisted living.    Hematologic: Comments: Left LE blood clot prior to AKA  Sickle cell trait    (+) History of blood clots, pt is not anticoagulated, anemia, history of blood transfusion, no previous transfusion reaction,     Musculoskeletal: Comment: Neck pain   DDD, chronic low back pain, intrathecal pump  (+) arthritis,     GI/Hepatic: Comment: Chronic dysphagia. Previous strictures with dilations.  Eats pureed diet, takes meds with yogurt.  History appy, jessy    (+) GERD, Symptomatic, appendicitis, cholecystitis/cholelithiasis,     Renal/Genitourinary: Comment: Functional incontinence  Suprapubic catheter  Recurrent UTIs      Endo:     (+) type II DM, Last HgA1c: 5.1, date: 12/26/20, Not using insulin, - not using insulin pump. Normal glucose range: 98, Diabetic complications: neuropathy gastroparesis cardiac problems.     Psychiatric/Substance Use:     (+) psychiatric history schizophrenia, anxiety and depression     Infectious Disease:  - neg infectious disease ROS     Malignancy:  - neg malignancy ROS     Other:      (+) , H/O Chronic Pain,other significant disability Wheelchair bound and Blind (Cannot see with left eye, limited vision right, no peripheral vision, legally blind ),        LABS: Personally reviewed  CBC:   Lab Results   Component Value Date    WBC 10.8 07/06/2021    WBC 7.9 04/27/2021    HGB 9.9 (L) 07/06/2021    HGB 12.6 04/27/2021    HCT 30.1 (L) 07/06/2021    HCT 36.6 04/27/2021     07/06/2021     04/27/2021     BMP:   Lab Results   Component Value Date     07/06/2021     04/27/2021    POTASSIUM 3.7 07/06/2021    POTASSIUM 3.9 04/27/2021    CHLORIDE 105 07/06/2021    CHLORIDE 105 04/27/2021    CO2 29 07/06/2021     "CO2 30 04/27/2021    BUN 19 07/06/2021    BUN 8 04/27/2021    CR 0.83 07/06/2021    CR 0.60 04/27/2021     (H) 07/06/2021    GLC 89 04/27/2021     COAGS:   Lab Results   Component Value Date    PTT 27 06/11/2020    INR 1.09 12/26/2020    FIBR 497 (H) 06/11/2020     POC:   Lab Results   Component Value Date    BGM 86 01/07/2021     HEPATIC:   Lab Results   Component Value Date    ALBUMIN 2.7 (L) 07/06/2021    PROTTOTAL 7.4 07/06/2021    ALT 40 07/06/2021    AST 34 07/06/2021    ALKPHOS 177 (H) 07/06/2021    BILITOTAL <0.1 (L) 07/06/2021    ALEX 32 08/02/2016     OTHER:   Lab Results   Component Value Date    PH 7.40 09/14/2015    LACT 2.0 07/06/2021    A1C 5.1 12/26/2020    CAROL 8.7 07/06/2021    PHOS 3.9 12/26/2020    MAG 2.1 12/26/2020    LIPASE 129 07/04/2018    TSH 0.95 12/26/2020    T4 0.88 05/17/2013    CRP 1.7 (H) 04/27/2021    SED 72 (H) 12/01/2015       Temp: 97  F (36.1  C) Temp src: Oral BP: (!) 87/56 Pulse: 58   Resp: 16 SpO2: 95 %         117 lbs 0 oz  3' 7\"[pt reported[   Body mass index is 44.49 kg/m .       Physical Exam  Constitutional: Awake, alert, cooperative, no apparent distress, and appears stated age. In w/c.  Eyes: Limited exam of eyes. Sclera clear.   HENT: Normocephalic, oral pharynx with moist mucus membranes, good dentition. No goiter appreciated.   Respiratory: Clear to auscultation bilaterally, no crackles or wheezing. Diminished BS. No cough or obvious dyspnea.  Cardiovascular: Regular rate and rhythm, normal S1 and S2, and no murmur noted. Palpable pulses to radial arteries.  GI: Normal bowel sounds, soft, non-distended, non-tender.   Lymph/Hematologic: No cervical lymphadenopathy and no supraclavicular lymphadenopathy.  Genitourinary:  Suprapubic catheter in place.  Skin: Warm and dry.   Musculoskeletal: Limited ROM of neck. There is no redness, warmth, or swelling of the joints. Gross motor strength is weakened.  Neurologic: Awake, alert, oriented to name, place and " time.  Neuropsychiatric: Calm, cooperative. Normal affect.     EKG: Personally reviewed 4/27/21 Sinus bradycardia.  Cardiac echo: 12/26/20    Interpretation Summary    Global and regional left ventricular function is normal with an EF of 60-65%.    Right ventricular function, chamber size, wall motion, and thickness are    normal.    No significant valvular abnormalities were noted.    No pericardial effusion is present.      This study was compared with the study from 6/11/2020. No significant changes    noted.    Stress test: Stress Test  10/2/2017    Impression    1.  Myocardial perfusion imaging using single isotope technique    demonstrated a small, basal inferior nontransmural infarction and a    very small area of mild ischemia involving the distal inferolateral    wall and apex.     2. Gated images demonstrated mild basal inferior hypokinesis.  The    left ventricular systolic function is normal, with an ejection    fraction measured at 80%.    3. No previous study available for comparison    Cardiac catheterization 9/17/2016    Diagnosis    1.                   1-vessel coronary artery disease (RCA), without left main lesion.    2.                   Successful angioplasty (COLLEEN) of the proximal RCA.    3.                   Successful angioplasty (COLLEEN) of the mid RCA.    2018 Coronary angiogram         CONCLUSION:     1. Moderate in-stent renarrowing and additional disease in the right    coronary artery which does not appear to be flow-limiting. There is    mild disease in the left coronary system.    2. Normal ejection fraction.    3. Note is made of some blush from RV branch. One might want to    consider getting echocardiogram.     2019 Carotid US                                                               Impression:      1. Right side:          Degree of stenosis: Less than 50 % of the right internal carotid    artery due to presence of plaque.          2. Left side:              Degree of stenosis:  Patent left internal carotid artery stent.    Shadowing plaque in the proximal ICA limits the sensitivity of the    study to detect stenosis at that level. However, there is no    poststenotic waveform change.     4/27/21 CT Pelvis Soft Tissue    FINDINGS:         PELVIS ORGANS: Bilateral iliac stents. Battery pack left anterior abdominal wall with attached wires leading to the back. Right inguinal surgical clips. Suprapubic tube.         Bowel appears normal. No adenopathy. No pelvic mass or free fluid.         MUSCULOSKELETAL: No subcutaneous abscess or ulcer. No evidence osteomyelitis or fracture.         1/5/21 US Renal                                                                       IMPRESSION:    1.  No hydronephrosis.    2.  Bladder collapsed with suprapubic catheter in place.  Imaging and cardiac testing reviewed by this provider      Outside records reviewed from: Care Everywhere    ASSESSMENT and PLAN  Sonya Foote is a 71 year old female scheduled to undergo CYSTOSCOPY, INJECT BOTOX INTO BLADDER with Dr. Gordon on 7/22/21. She has the following specific operative considerations:   - RCRI : 11% risk of major adverse cardiac event.   - Anesthesia considerations:  Refer to PAC assessment in anesthesia records  - VTE risk: 0.5%  - Risk of PONV score = 2.  If > 2, anti-emetic intervention recommended.    --Functional incontinence, overactive bladder, and recurrent urinary tract infections, managed by suprapubic catheter. She is followed by Dr. Gordon and is s/p Botox injections on 1/7/21 without difficulty. Above repeat procedure planned with MAC. Patient comfortable with plan.   --No history of problems with anesthesia.  --Functional status- METS 1.  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 CAD, s/p inferior MI with COLLEEN to pRCA and mRCA 9/17/2016. Last use of NTG was one month ago for a brief episode of chest pain upper left chest. On ASA 81 mg daily. In the past has been instructed to continue  her aspirin with no interruption prior to surgery. We have recommended that she hold up to DOS. Surgery team notified. Hypertension is managed with lisinopril but BP in low range 87/50s. Will hold on DOS. Heart failure - EF 35-40% (12/2016)-->Most recent echo 12/2020 EF 60-65%. Will take metoprolol on DOS. PVD- s/p AKA left 6/2016 and right 11/2016. Status post stent to left carotid. Carotid US and all testing above.   --Airway feasible. Has known sleep apnea and uses CPAP with 2 liters supplemental O2 nocturnally. Is using her inhalers and nebulizers as ordered. Smoking 10 cigarettes daily. No cough. Lungs CTA but diminished. Is bothered by the recent humidity. Albuterol neb ordered for DOS. Final decision by Anesthesia on DOS.  --GERD is managed with Protonix and will take on DOS. Has a chronic history of dysphagia and strictures. Has had multiple EGD procedures with dilation, none recent. Has known gastroparesis.  --Sickle cell trait. Taking iron for iron deficiency anemia.  Last Hgb was 9.9.  --Diabetes is managed with metformin. Hold metformin DOS. A1c 5.1.   --Seizure disorder - taking medications as ordered, but reports 4-5 petit mal seizures per day. Last grand mal in 2005.  +poor memory. History of CVA x 3. Residual loss of peripheral vision, left side weakness, dysphagia. BPPV. Peripheral neuropathy in hands.     --Chronic back pain. Has an intrathecal pump, scanned by Pharm D today. Was filled last week. Consider cautious positioning due to chronic pain and bilateral AKA. Will require careful transfers.    --Depression, anxiety and schizoaffective disorder. Will take AM meds.    --Legally blind, but notes she can see some shadows. Glaucoma.   --[place].    Arrival time, NPO, shower and medication instructions provided by nursing staff today.       Patient was discussed with Dr Wyatt. Will proceed.    VICTOR M Lepe CNS  Preoperative Assessment Center  [facility]  [place]  Phone: [phone]  Fax: [phone]

## 2021-07-20 NOTE — H&P (VIEW-ONLY)
Pre-Operative H & P     CC:  Preoperative exam to assess for increased cardiopulmonary risk while undergoing surgery and anesthesia.    Date of Encounter: 7/20/2021  Primary Care Physician:  Allan Casey  Reason for visit: Urge incontinence of urine [N39.41]  Overactive bladder [N32.81]    HPI  Sonya Foote is a 71 year old female who presents for pre-operative H & P in preparation for CYSTOSCOPY, INJECT BOTOX INTO BLADDER with Dr. Gordon on 7/22/21 at [facility]. History is obtained from the patient and medical records.     Patient with complex medical history to include CAD, s/p inferior MI with COLLEEN to pRCA & mRCA 9/17/2016, systolic CHF, hypertension, hyperlipidemia, PVD, s/p bilateral AKA,  h/o LLE blood clot prior to amputation, sickle cell trait, iron deficiency anemia, h/o transfusion (hives associated with), JOSE, nocturnal hypoxemia, COPD, asthma, GERD, dysphagia with history of esophageal stricture s/p multiple dilations, gastroparesis, NIDDM, neuropathy; chronic low back pain (intrathecal pump in place), DDD, BPPV, seizure disorder, h/o CVA x 3, carotid stenosis s/p stent to left carotid; depression/anxiety, schizoaffective disorder, and 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 She has  complications of functional incontinence, overactive bladder, and recurrent urinary tract infections, managed by suprapubic catheter. She is followed by Dr. Gordon and is s/p Botox injections on 1/7/21 without difficulty. She was recommended to repeat procedure in six months and is now scheduled as above.     Today patient denies fever, cough, chest pain, or irregular HR. Her breathing is worsened by the recent humidity and she cannot stay outside very long. She continues her regimen of steroid inhaler, nebs and prn albuterol. She continues to wear her oxygen at night at 2 liters along with her CPAP. She reports one brief episode of chest pain a month ago for which she took NTG  and it resolved. She has been having neck pain and had an ED visit related to this.     When asked if she has had any changes in her health or medical conditions since her last Botox injection she reported the neck pain above and the concern for humidity affecting her breathing.     Past Medical History  Past Medical History:   Diagnosis Date     Acid reflux disease      Benign essential hypertension      Blind left eye     due to central retinal artery occlusion     CAD (coronary artery disease)     UA and inferior MI in 2016 s/p PCI     Chest pain on breathing 06/10/2020     Chronic back pain      Chronic neck pain      Chronic pain syndrome     with fentanyl intrathecal pain pump     Complex sleep apnea syndrome      COPD (chronic obstructive pulmonary disease) (H)      Dysphagia     chronic due to esophageal strictures and multiple previous dilitations      ROGER (generalized anxiety disorder)      History of blood transfusion     x5; no adverse reactions     History of central retinal artery occlusion     left-sided 2006     History of stroke     residual left-sided weakness     Hx of carotid artery stenosis     s/p left carotid stent in 2006 and balloon angioplasty in 2016     MDD (major depressive disorder)      Neurogenic bladder     SPT in place     JOSE (obstructive sleep apnea)      Osteoporosis      PAD (peripheral artery disease) (H)      Peripheral neuropathy      Person who has had sex change operation     male to female     Seizure disorder (H)     many years since last grand mal; daily, brief petit mals     Sickle cell trait (H)      Type 2 diabetes mellitus (H)      Urge incontinence of urine        Past Surgical History  Past Surgical History:   Procedure Laterality Date     AMPUTATE LEG ABOVE KNEE Left 6/11/2016    Procedure: AMPUTATE LEG ABOVE KNEE;  Surgeon: Mello Rodriguez MD;  [facility]     AMPUTATE LEG BELOW KNEE Right 11/7/2016    Procedure: AMPUTATE LEG BELOW KNEE;  Surgeon: Carleen  MD Savannah;  [facility]     AMPUTATE REVISION STUMP LOWER EXTREMITY Right 11/11/2016    Procedure: AMPUTATE REVISION STUMP LOWER EXTREMITY;  Surgeon: Savannah Durant MD;  [facility]     AMPUTATE REVISION STUMP LOWER EXTREMITY Right 11/16/2016    Procedure: AMPUTATE REVISION STUMP LOWER EXTREMITY;  Surgeon: Savannah Durant MD;  [facility]     AMPUTATE TOE(S) Right 1/5/2016    Procedure: AMPUTATE TOE(S);  Surgeon: Mello Gaines DPM;  Location: [facility]     ANGIOGRAM Bilateral 11/21/2014    Procedure: ANGIOGRAM;  Surgeon: Savannah Durant MD;  [facility]     ANGIOGRAM Left 1/16/2015    Procedure: ANGIOGRAM;  Surgeon: Savannah Durant MD;  [facility]     ANGIOGRAM Bilateral 9/14/2015    Procedure: ANGIOGRAM;  Surgeon: Savannah Durant MD;  [facility]     ANGIOGRAM Left 10/12/2015    Procedure: ANGIOGRAM;  Surgeon: Savannah Durant MD;  [facility]     ANGIOGRAM Right 6/6/2016    Procedure: ANGIOGRAM;  Surgeon: Savannah Durant MD;  [facility]     ANGIOPLASTY Right 6/6/2016    Procedure: ANGIOPLASTY;  Surgeon: Savannah Durant MD;  [facility]     APPENDECTOMY       BREAST BIOPSY, RT/LT Right     benign     CATARACT IOL, RT/LT Right      CHOLECYSTECTOMY       COLONOSCOPY N/A 8/25/2014    Procedure: COLONOSCOPY;  Surgeon: Mello Ferrer MD;  [facility]     COLONOSCOPY WITH CO2 INSUFFLATION N/A 8/20/2014    Procedure: COLONOSCOPY WITH CO2 INSUFFLATION;  Surgeon: Duane, William Charles, MD;  Location: [facility]     CYSTOSCOPY, INJECT BOTOX N/A 5/21/2020    Procedure: CYSTOSCOPY, WITH BOTULINUM TOXIN INJECTION;  Surgeon: Loki Gordon MD;  [facility]     CYSTOSCOPY, INJECT BOTOX N/A 10/8/2020    Procedure: CYSTOSCOPY, INJECT BOTOX INTO BLADDER, SUPRAPUBIC TUBE EXCHNAGE;  Surgeon: Loki Gordon MD;  [facility]     CYSTOSCOPY, INJECT BOTOX N/A 1/7/2021    Procedure: CYSTOSCOPY, WITH BOTULINUM TOXIN INJECTION;  Surgeon: Loki Gordon MD;  [facility]      CYSTOSCOPY, INTRAVESICAL INJECTION N/A 10/31/2019    Procedure: CYSTOSCOPY, BOTOX INJECTION, Suprapubic Catheter Exchange;  Surgeon: Loki Gordon MD;  [facility]     CYSTOSTOMY, INSERT TUBE SUPRAPUBIC, COMBINED N/A 1/16/2018    Procedure: COMBINED CYSTOSTOMY, INSERT TUBE SUPRAPUBIC;  Cystoscopy, Intraoperative Ultrasound, Suprapubic Tube Placement;  Surgeon: Keanu Dawson MD;  [facility]     ENDARTERECTOMY FEMORAL  5/23/2014    Procedure: ENDARTERECTOMY FEMORAL;  Surgeon: Jason Joshi MD;  [facility]     ESOPHAGOSCOPY, GASTROSCOPY, DUODENOSCOPY (EGD), COMBINED  12/14/2012    Procedure: COMBINED ESOPHAGOSCOPY, GASTROSCOPY, DUODENOSCOPY (EGD), BIOPSY SINGLE OR MULTIPLE;  ESOPHAGOSCOPY, GASTROSCOPY, DUODENOSCOPY (EGD), DILATATION ;  Surgeon: Elizabeth Stevenson MD;  Location:  GI     ESOPHAGOSCOPY, GASTROSCOPY, DUODENOSCOPY (EGD), COMBINED  12/31/2013    Procedure: COMBINED ESOPHAGOSCOPY, GASTROSCOPY, DUODENOSCOPY (EGD), BIOPSY SINGLE OR MULTIPLE;;  Surgeon: Celmente Lopez MD;  [facility]     ESOPHAGOSCOPY, GASTROSCOPY, DUODENOSCOPY (EGD), COMBINED  4/1/2014    Procedure: COMBINED ESOPHAGOSCOPY, GASTROSCOPY, DUODENOSCOPY (EGD);;  Surgeon: Clemente Lopez MD;  [facility]     ESOPHAGOSCOPY, GASTROSCOPY, DUODENOSCOPY (EGD), COMBINED  6/28/2014    Procedure: COMBINED ESOPHAGOSCOPY, GASTROSCOPY, DUODENOSCOPY (EGD);  Surgeon: Clemente Lopez MD;  [facility]     ESOPHAGOSCOPY, GASTROSCOPY, DUODENOSCOPY (EGD), COMBINED N/A 8/20/2014    Procedure: COMBINED ESOPHAGOSCOPY, GASTROSCOPY, DUODENOSCOPY (EGD), BIOPSY SINGLE OR MULTIPLE;  Surgeon: Duane, William Charles, MD;  [facility]     ESOPHAGOSCOPY, GASTROSCOPY, DUODENOSCOPY (EGD), COMBINED N/A 8/22/2014    Procedure: COMBINED ESOPHAGOSCOPY, GASTROSCOPY, DUODENOSCOPY (EGD), BIOPSY SINGLE OR MULTIPLE;  Surgeon: Mello Ferrer MD;  Location:  GI     ESOPHAGOSCOPY, GASTROSCOPY, DUODENOSCOPY (EGD), COMBINED N/A  10/2/2014    Procedure: COMBINED ESOPHAGOSCOPY, GASTROSCOPY, DUODENOSCOPY (EGD), BIOPSY SINGLE OR MULTIPLE;  Surgeon: Remy Haskins MD;  [facility]     ESOPHAGOSCOPY, GASTROSCOPY, DUODENOSCOPY (EGD), COMBINED Left 12/15/2014    Procedure: COMBINED ESOPHAGOSCOPY, GASTROSCOPY, DUODENOSCOPY (EGD), BIOPSY SINGLE OR MULTIPLE;  Surgeon: Remy Haskins MD;  [facility]     ESOPHAGOSCOPY, GASTROSCOPY, DUODENOSCOPY (EGD), COMBINED N/A 2/25/2015    Procedure: COMBINED ENDOSCOPIC ULTRASOUND, ESOPHAGOSCOPY, GASTROSCOPY, DUODENOSCOPY (EGD), FINE NEEDLE ASPIRATE/BIOPSY;  Surgeon: Clemente Lugo MD;  [facility]     ESOPHAGOSCOPY, GASTROSCOPY, DUODENOSCOPY (EGD), COMBINED Left 2/25/2015    Procedure: COMBINED ESOPHAGOSCOPY, GASTROSCOPY, DUODENOSCOPY (EGD), BIOPSY SINGLE OR MULTIPLE;  Surgeon: Clemente Lugo MD;  [facility]     ESOPHAGOSCOPY, GASTROSCOPY, DUODENOSCOPY (EGD), COMBINED N/A 9/25/2016    Procedure: COMBINED ESOPHAGOSCOPY, GASTROSCOPY, DUODENOSCOPY (EGD);  Surgeon: Aziza Patiño MD;  [facility]     ESOPHAGOSCOPY, GASTROSCOPY, DUODENOSCOPY (EGD), COMBINED N/A 1/18/2017    Procedure: COMBINED ESOPHAGOSCOPY, GASTROSCOPY, DUODENOSCOPY (EGD), BIOPSY SINGLE OR MULTIPLE;  Surgeon: Clemente Lopez MD;  [facility]     ESOPHAGOSCOPY, GASTROSCOPY, DUODENOSCOPY (EGD), COMBINED N/A 11/26/2017    Procedure: COMBINED ESOPHAGOSCOPY, GASTROSCOPY, DUODENOSCOPY (EGD), REMOVE FOREIGN BODY;  Esophagogastroduodenoscopy with foreign body extraction  ;  Surgeon: Herberth Castrejon MD;  [facility]     ESOPHAGOSCOPY, GASTROSCOPY, DUODENOSCOPY (EGD), COMBINED N/A 11/26/2017    Procedure: COMBINED ESOPHAGOSCOPY, GASTROSCOPY, DUODENOSCOPY (EGD), REMOVE FOREIGN BODY;;  Surgeon: Herberth Castrejon MD;  Location:  GI     ESOPHAGOSCOPY, GASTROSCOPY, DUODENOSCOPY (EGD), COMBINED N/A 4/10/2020    Procedure: ESOPHAGOGASTRODUODENOSCOPY (EGD);  Surgeon: Yael Cabral MD;  Location:   OR     FASCIOTOMY LOWER EXTREMITY Left 6/10/2016    Procedure: FASCIOTOMY LOWER EXTREMITY;  Surgeon: Mello Rodriguez MD;  [facility]     HC CAPSULE ENDOSCOPY N/A 8/25/2014    Procedure: CAPSULE/PILL CAM ENDOSCOPY;  Surgeon: Remy Haskins MD;  [facility]     HC CAPSULE ENDOSCOPY N/A 10/2/2014    Procedure: CAPSULE/PILL CAM ENDOSCOPY;  Surgeon: Remy Haskins MD;  [facility]     INJECT BLOCK MEDIAL BRANCH CERVICAL/THORACIC/LUMBAR Left 10/30/2020    Procedure: Cervical 3, 4, and 5 Medial Branch Block;  Surgeon: Evelyn Lima MD;  Location: [facility] OR     ORTHOPEDIC SURGERY      broken wrist repair     REVISE CATHETER INTRATHECAL  08/24/2020     SEX TRANSFORMATION SURGERY, MALE TO FEMALE  1974 1974     SINUS SURGERY      cyst removed     TONSILLECTOMY       VASCULAR SURGERY  2006    Left carotid stent       Hx of Blood transfusions/reactions: Yes in past. Notes indicate history of hives reaction but patient denied.    Hx of abnormal bleeding or anti-platelet use: ASA 81 mg daily.     Menstrual history: No LMP recorded. (Menstrual status: Amenorrhea).    Steroid use in the last year: Unknown.     Personal or FH with difficulty with Anesthesia:  Denies.     Prior to Admission Medications  Current Outpatient Medications   Medication Sig Dispense Refill     ACETAMINOPHEN EXTRA STRENGTH 500 MG tablet TAKE 1 TABLET BY MOUTH EVERY 6 HOURS AS NEEDED FOR PAIN / FEVER 90 tablet 5     ADVAIR DISKUS 250-50 MCG/DOSE inhaler INHALE 1 PUFF BY MOUTH INTO THE LUNGS TWICE DAILY 60 each 1     albuterol (PROAIR HFA/PROVENTIL HFA/VENTOLIN HFA) 108 (90 Base) MCG/ACT inhaler Inhale 2 puffs into the lungs every 6 hours (Patient not taking: Reported on 7/20/2021) 8.5 g 5     albuterol (PROVENTIL) (2.5 MG/3ML) 0.083% neb solution Take 1 vial (2.5 mg) by nebulization every 6 hours as needed for shortness of breath / dyspnea or wheezing 180 mL 3     alendronate (FOSAMAX) 70 MG tablet TAKE ONE TABLET BY MOUTH  EVERY 7 DAYS (TAKE WITH 8OZ OF WATER 30 MINUTES BEFORE BREAKFAST AND REMAIN UPRIGHT DURING THIS TIME) (Patient taking differently: TAKE ONE TABLET BY MOUTH EVERY 7 DAYS (TAKE WITH 8OZ OF WATER 30 MINUTES BEFORE BREAKFAST AND REMAIN UPRIGHT DURING THIS TIME)  Takes on Mondays) 4 tablet 97     ASPERCREME LIDOCAINE 4 % Patch APPLY 1 PATCH TOPICALLY TO LOWER BACK ONCE DAILY (ON FOR 12 HOURS, OFF FOR 12 HOURS) 30 patch 10     ASPIRIN LOW DOSE 81 MG chewable tablet CHEW AND SWALLOW 1 TABLET BY MOUTH EVERY MORNING 28 tablet 11     atorvastatin (LIPITOR) 40 MG tablet TAKE 1 TABLET BY MOUTH AT BEDTIME 28 tablet 11     blood glucose monitoring (ONE TOUCH ULTRA 2) meter device kit Use to test blood sugars 3 times daily or as directed. 1 kit 0     blood glucose monitoring (ONE TOUCH ULTRA) test strip Use to test blood sugars 3 times daily or as directed. 3 Box 3     blood glucose monitoring (ONE TOUCH ULTRASOFT) lancets Use to test blood sugar 3 times daily or as directed. 100 each PRN     brimonidine (ALPHAGAN) 0.2 % ophthalmic solution Place 1 drop into the right eye 2 times daily 5 mL 5     diclofenac (VOLTAREN) 1 % topical gel APPLY 2 GRAMS TOPICALLY TO AFFECTED AREA(S) THREE TIMES A DAY (Patient taking differently: Apply 2 g topically 4 times daily ) 100 g 11     dorzolamide (TRUSOPT) 2 % ophthalmic solution Place 1 drop into the right eye 2 times daily 1 Bottle 1     EPINEPHrine (ANY BX GENERIC EQUIV) 0.3 MG/0.3ML injection 2-pack INJECT 0.3 ML INTO THE MUSCLE AS NEEDED FOR ANAPHYLAXIS (Patient not taking: Reported on 7/20/2021) 2 each 1     escitalopram (LEXAPRO) 10 MG tablet TAKE 1 TABLET BY MOUTH ONCE DAILY 28 tablet 11     FEROSUL 325 (65 Fe) MG tablet TAKE 1 TABLET BY MOUTH TWICE DAILY WITH MEALS 56 tablet 11     fluticasone (FLONASE) 50 MCG/ACT nasal spray SPRAY 2 SPRAYS IN EACH NOSTRIL DAILY 16 g 11     fluticasone-salmeterol (ADVAIR) 500-50 MCG/DOSE inhaler Inhale 1 puff into the lungs 2 times daily 1 each 11      INCRUSE ELLIPTA 62.5 MCG/INH Inhaler INHALE 1 PUFF BY MOUTH ONCE DAILY 30 each 10     lactulose (CHRONULAC) 10 GM/15ML solution TAKE 30ML (20MG) BY MOUTH AS NEEDED FOR CONSTIPATION (Patient not taking: Reported on 7/20/2021) 473 mL 1     latanoprost (XALATAN) 0.005 % ophthalmic solution INSTILL ONE DROP IN EACH EYE AT BEDTIME 2.5 mL 10     levETIRAcetam (KEPPRA) 500 MG tablet TAKE 1 TABLET BY MOUTH TWICE DAILY 56 tablet 11     lisinopril (ZESTRIL) 20 MG tablet TAKE 1 TABLET BY MOUTH EVERY MORNING 28 tablet 11     loratadine (CLARITIN) 10 MG tablet TAKE 1 TABLET BY MOUTH ONCE DAILY 28 tablet 11     Medication given by intrathecal pump continuous prn Pump interrogated 7/20/21  Drug # 1: Fentanyl (Sublimaze).    Conc:2000  mcg/mL  Total Dose / 24 hours: 600  mcg  Drug # 2: Bupivacaine (Marcaine) .    Conc:20  mg/mL  Total Dose / 24 hours: 6  mg  Drug # 3: Morphine (Duramorph or Infumorph) .    Conc:9.1  mg/mL  Total Dose / 24 hours: 1.11  mg   24 hour dose with simple continuous infusion rate of 34.4mcg/hr   PTM: 13 max per 24 hour. Each bolus will provide 75mcg  Refill pump before 8/6/21  Pump Volume capacity 40ml  Current volume 16.1ml       metFORMIN (GLUCOPHAGE) 500 MG tablet TAKE 1 TABLET BY MOUTH EVERY EVENING WITH DINNER 28 tablet 11     methocarbamol (ROBAXIN) 500 MG tablet Take 500 mg by mouth 4 times daily       metoprolol succinate ER (TOPROL-XL) 50 MG 24 hr tablet TAKE 1 TABLET BY MOUTH EVERY MORNING 28 tablet 11     montelukast (SINGULAIR) 10 MG tablet Take 10 mg by mouth At Bedtime       Multiple Vitamins-Minerals (TAB-A-MACY) TABS TAKE 1 TABLET BY MOUTH ONCE DAILY (Patient not taking: Reported on 7/20/2021) 28 tablet 11     nicotine (NICODERM CQ) 14 MG/24HR 24 hr patch APPLY 1 PATCH TOPICALLY DAILY ( EVERY 24 HOURS ) 28 patch 3     nitroGLYcerin (NITROSTAT) 0.4 MG sublingual tablet For chest pain place 1 tablet under the tongue every 5 minutes for 3 doses. If symptoms persist 5 minutes after 1st dose  call 911. 25 tablet 3     Nutritional Supplements (ENSURE) LIQD DRINK ONE CAN / BOTTLE BY MOUTH TWICE DAILY WITH MEALS 77602 mL 11     oxyCODONE (OXY-IR) 5 MG capsule Take 1-2 capsules by mouth every 4 hours as needed for severe pain (Patient not taking: Reported on 7/20/2021)       pantoprazole (PROTONIX) 40 MG EC tablet TAKE 1 TABLET BY MOUTH EVERY MORNING 28 tablet 11     phenytoin (DILANTIN) 100 MG capsule TAKE 2 CAPS (200MG) BY MOUTH TWICE A  capsule 11     polyethylene glycol (MIRALAX) 17 GM/SCOOP powder MIX 17GM OF POWDER IN 8OZ OF WATER UNTIL COMPLETELY DISSOLVED. DRINK SOLUTION BY MOUTH IN THE MORNING 510 g 8     pregabalin (LYRICA) 150 MG capsule TAKE 1 CAPSULE BY MOUTH THREE TIMES DAILY (Patient taking differently: Take 150 mg by mouth 4 times daily ) 90 capsule 4     risperiDONE (RISPERDAL) 0.5 MG tablet TAKE 1 TABLET BY MOUTH AT BEDTIME 28 tablet 11     SENEXON-S 8.6-50 MG tablet TAKE 1 TABLET BY MOUTH TWICE DAILY 56 tablet 11     solifenacin (VESICARE) 10 MG tablet TAKE 1 TABLET BY MOUTH EVERY MORNING 28 tablet 11     spacer (OPTICHAMBER SANDRA) holding chamber spacer 1 each 0     sulfamethoxazole-trimethoprim (BACTRIM DS) 800-160 MG tablet Take 1 tablet by mouth 2 times daily for 3 days (Patient not taking: Reported on 7/20/2021) 6 tablet 0     traZODone (DESYREL) 150 MG tablet TAKE 1 TABLET BY MOUTH AT BEDTIME 28 tablet 11     Vitamin D3 (VITAMIN D) 10 MCG (400 UNIT) tablet TAKE TWO TABLETS (800 UNITS) BY MOUTH ONCE DAILY 56 tablet 11       Allergies  Allergies   Allergen Reactions     Bee Venom Anaphylaxis     Penicillins Anaphylaxis     Dilantin [Phenytoin] Other (See Comments)     Generic dilantin only per pt     Iodine Hives     Novocaine [Procaine] Hives     Tositumomab Unknown       Social History  Social History     Socioeconomic History     Marital status:      Spouse name: Not on file     Number of children: 2     Years of education: Not on file     Highest education level:  Not on file   Occupational History     Occupation: Retired   Tobacco Use     Smoking status: Current Every Day Smoker     Packs/day: 1.00     Years: 30.00     Pack years: 30.00     Types: Cigarettes, Cigars     Smokeless tobacco: Never Used     Tobacco comment: 10 cigs daily   Substance and Sexual Activity     Alcohol use: No     Alcohol/week: 0.0 standard drinks     Drug use: No     Sexual activity: Not Currently   Other Topics Concern     Parent/sibling w/ CABG, MI or angioplasty before 65F 55M? Not Asked      Service Not Asked     Blood Transfusions Not Asked     Caffeine Concern No     Comment: 1 in the morning     Occupational Exposure Not Asked     Hobby Hazards Not Asked     Sleep Concern Not Asked     Stress Concern Not Asked     Weight Concern Not Asked     Special Diet Not Asked     Back Care Not Asked     Exercise Not Asked     Bike Helmet Not Asked     Seat Belt Not Asked     Self-Exams Not Asked   Social History Narrative    Living in a nursing home (as of 2020) for prosthetic fitting and therapy; [facility]    Two adopted children (Kam and Prashanth) and 3 grandchildren (as of 2020).     Social Determinants of Health     Financial Resource Strain:      Difficulty of Paying Living Expenses:    Food Insecurity:      Worried About Running Out of Food in the Last Year:      Ran Out of Food in the Last Year:    Transportation Needs:      Lack of Transportation (Medical):      Lack of Transportation (Non-Medical):    Physical Activity:      Days of Exercise per Week:      Minutes of Exercise per Session:    Stress:      Feeling of Stress :    Social Connections:      Frequency of Communication with Friends and Family:      Frequency of Social Gatherings with Friends and Family:      Attends Hinduism Services:      Active Member of Clubs or Organizations:      Attends Club or Organization Meetings:      Marital Status:    Intimate Partner Violence:      Fear of Current or Ex-Partner:       Emotionally Abused:      Physically Abused:      Sexually Abused:        Family History  Family History   Problem Relation Age of Onset     Dementia Mother      Diabetes Mother         type unknown     Coronary Artery Disease Mother         MI     Glaucoma Father      Diabetes Father         type unknown     Heart Failure Father      Schizophrenia Brother      Depression Brother      Suicide Sister      Depression Sister      Diabetes Sister      Ovarian Cancer Maternal Aunt      Leukemia Maternal Aunt      Diabetes Maternal Grandmother      Diabetes Maternal Grandfather      Diabetes Paternal Grandmother      Diabetes Paternal Grandfather      Breast Cancer Sister      Cerebrovascular Disease Brother      Colon Cancer No family hx of      Macular Degeneration No family hx of      ROS/MED HISTORY  The complete review of systems is negative other than noted in the HPI or here.    ENT/Pulmonary: Comment: 02 2 liters with CPAP at night  Smokes 10 cigs daily    (+) sleep apnea, uses CPAP, allergic rhinitis, vocal cord abnormalities -  Hoarseness, tobacco use, Current use, Moderate Persistent, asthma Treatment: Inhaled steroids, Inhaler prn and Nebulizer daily,  moderate,  COPD, O2 dependent, during Nighttime, 2 liters/min,  (-) recent URI   Neurologic: Comment: Ruby  RLS    (+) peripheral neuropathy, - hands. seizures, features: 4-5 petit mal seizures daily, CVA, date: 05, 07, 08, with deficits, - left side weakness, peripheral vision loss, dysphagia.     Cardiovascular: Comment: PVD and thrombosis of LLE, s/p left AKA 2016, PVD RLE, s/p right AKA 2016, left carotid stent  MI 2005, inferior MI 2016  History of brief episode of chest pain 1 month ago with NTG    (+) Dyslipidemia hypertension-Peripheral Vascular Disease-- Carotid Stenosis. CAD -past MI -stent-9/2016. 2 Drug Eluting Stent. Taking blood thinners Pt has received instructions: Instructions Given to patient: Will remain on aspirin up to DOS. CHF etiology:  diastolic Previous cardiac testing   Echo: Date: 12/26/20 Results:    Stress Test: Date: 2017 Results:    ECG Reviewed: Date: 4/27/21 Results:  SB  Cath:  Date: 2016 Results:      METS/Exercise Tolerance: 1 - Eating, dressing Comment: Patient lives in assisted living.    Hematologic: Comments: Left LE blood clot prior to AKA  Sickle cell trait    (+) History of blood clots, pt is not anticoagulated, anemia, history of blood transfusion, no previous transfusion reaction,     Musculoskeletal: Comment: Neck pain   DDD, chronic low back pain, intrathecal pump  (+) arthritis,     GI/Hepatic: Comment: Chronic dysphagia. Previous strictures with dilations.  Eats pureed diet, takes meds with yogurt.  History appy, jessy    (+) GERD, Symptomatic, appendicitis, cholecystitis/cholelithiasis,     Renal/Genitourinary: Comment: Functional incontinence  Suprapubic catheter  Recurrent UTIs      Endo:     (+) type II DM, Last HgA1c: 5.1, date: 12/26/20, Not using insulin, - not using insulin pump. Normal glucose range: 98, Diabetic complications: neuropathy gastroparesis cardiac problems.     Psychiatric/Substance Use:     (+) psychiatric history schizophrenia, anxiety and depression     Infectious Disease:  - neg infectious disease ROS     Malignancy:  - neg malignancy ROS     Other:      (+) , H/O Chronic Pain,other significant disability Wheelchair bound and Blind (Cannot see with left eye, limited vision right, no peripheral vision, legally blind ),        LABS: Personally reviewed  CBC:   Lab Results   Component Value Date    WBC 10.8 07/06/2021    WBC 7.9 04/27/2021    HGB 9.9 (L) 07/06/2021    HGB 12.6 04/27/2021    HCT 30.1 (L) 07/06/2021    HCT 36.6 04/27/2021     07/06/2021     04/27/2021     BMP:   Lab Results   Component Value Date     07/06/2021     04/27/2021    POTASSIUM 3.7 07/06/2021    POTASSIUM 3.9 04/27/2021    CHLORIDE 105 07/06/2021    CHLORIDE 105 04/27/2021    CO2 29 07/06/2021     "CO2 30 04/27/2021    BUN 19 07/06/2021    BUN 8 04/27/2021    CR 0.83 07/06/2021    CR 0.60 04/27/2021     (H) 07/06/2021    GLC 89 04/27/2021     COAGS:   Lab Results   Component Value Date    PTT 27 06/11/2020    INR 1.09 12/26/2020    FIBR 497 (H) 06/11/2020     POC:   Lab Results   Component Value Date    BGM 86 01/07/2021     HEPATIC:   Lab Results   Component Value Date    ALBUMIN 2.7 (L) 07/06/2021    PROTTOTAL 7.4 07/06/2021    ALT 40 07/06/2021    AST 34 07/06/2021    ALKPHOS 177 (H) 07/06/2021    BILITOTAL <0.1 (L) 07/06/2021    ALEX 32 08/02/2016     OTHER:   Lab Results   Component Value Date    PH 7.40 09/14/2015    LACT 2.0 07/06/2021    A1C 5.1 12/26/2020    CAROL 8.7 07/06/2021    PHOS 3.9 12/26/2020    MAG 2.1 12/26/2020    LIPASE 129 07/04/2018    TSH 0.95 12/26/2020    T4 0.88 05/17/2013    CRP 1.7 (H) 04/27/2021    SED 72 (H) 12/01/2015       Temp: 97  F (36.1  C) Temp src: Oral BP: (!) 87/56 Pulse: 58   Resp: 16 SpO2: 95 %         117 lbs 0 oz  3' 7\"[pt reported[   Body mass index is 44.49 kg/m .       Physical Exam  Constitutional: Awake, alert, cooperative, no apparent distress, and appears stated age. In w/c.  Eyes: Limited exam of eyes. Sclera clear.   HENT: Normocephalic, oral pharynx with moist mucus membranes, good dentition. No goiter appreciated.   Respiratory: Clear to auscultation bilaterally, no crackles or wheezing. Diminished BS. No cough or obvious dyspnea.  Cardiovascular: Regular rate and rhythm, normal S1 and S2, and no murmur noted. Palpable pulses to radial arteries.  GI: Normal bowel sounds, soft, non-distended, non-tender.   Lymph/Hematologic: No cervical lymphadenopathy and no supraclavicular lymphadenopathy.  Genitourinary:  Suprapubic catheter in place.  Skin: Warm and dry.   Musculoskeletal: Limited ROM of neck. There is no redness, warmth, or swelling of the joints. Gross motor strength is weakened.  Neurologic: Awake, alert, oriented to name, place and " time.  Neuropsychiatric: Calm, cooperative. Normal affect.     EKG: Personally reviewed 4/27/21 Sinus bradycardia.  Cardiac echo: 12/26/20    Interpretation Summary    Global and regional left ventricular function is normal with an EF of 60-65%.    Right ventricular function, chamber size, wall motion, and thickness are    normal.    No significant valvular abnormalities were noted.    No pericardial effusion is present.      This study was compared with the study from 6/11/2020. No significant changes    noted.    Stress test: Stress Test  10/2/2017    Impression    1.  Myocardial perfusion imaging using single isotope technique    demonstrated a small, basal inferior nontransmural infarction and a    very small area of mild ischemia involving the distal inferolateral    wall and apex.     2. Gated images demonstrated mild basal inferior hypokinesis.  The    left ventricular systolic function is normal, with an ejection    fraction measured at 80%.    3. No previous study available for comparison    Cardiac catheterization 9/17/2016    Diagnosis    1.                   1-vessel coronary artery disease (RCA), without left main lesion.    2.                   Successful angioplasty (COLLEEN) of the proximal RCA.    3.                   Successful angioplasty (COLLEEN) of the mid RCA.    2018 Coronary angiogram         CONCLUSION:     1. Moderate in-stent renarrowing and additional disease in the right    coronary artery which does not appear to be flow-limiting. There is    mild disease in the left coronary system.    2. Normal ejection fraction.    3. Note is made of some blush from RV branch. One might want to    consider getting echocardiogram.     2019 Carotid US                                                               Impression:      1. Right side:          Degree of stenosis: Less than 50 % of the right internal carotid    artery due to presence of plaque.          2. Left side:              Degree of stenosis:  Patent left internal carotid artery stent.    Shadowing plaque in the proximal ICA limits the sensitivity of the    study to detect stenosis at that level. However, there is no    poststenotic waveform change.     4/27/21 CT Pelvis Soft Tissue    FINDINGS:         PELVIS ORGANS: Bilateral iliac stents. Battery pack left anterior abdominal wall with attached wires leading to the back. Right inguinal surgical clips. Suprapubic tube.         Bowel appears normal. No adenopathy. No pelvic mass or free fluid.         MUSCULOSKELETAL: No subcutaneous abscess or ulcer. No evidence osteomyelitis or fracture.         1/5/21 US Renal                                                                       IMPRESSION:    1.  No hydronephrosis.    2.  Bladder collapsed with suprapubic catheter in place.  Imaging and cardiac testing reviewed by this provider      Outside records reviewed from: Care Everywhere    ASSESSMENT and PLAN  Sonya Foote is a 71 year old female scheduled to undergo CYSTOSCOPY, INJECT BOTOX INTO BLADDER with Dr. Gordon on 7/22/21. She has the following specific operative considerations:   - RCRI : 11% risk of major adverse cardiac event.   - Anesthesia considerations:  Refer to PAC assessment in anesthesia records  - VTE risk: 0.5%  - Risk of PONV score = 2.  If > 2, anti-emetic intervention recommended.    --Functional incontinence, overactive bladder, and recurrent urinary tract infections, managed by suprapubic catheter. She is followed by Dr. Gordon and is s/p Botox injections on 1/7/21 without difficulty. Above repeat procedure planned with MAC. Patient comfortable with plan.   --No history of problems with anesthesia.  --Functional status- METS 1.  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 CAD, s/p inferior MI with COLLEEN to pRCA and mRCA 9/17/2016. Last use of NTG was one month ago for a brief episode of chest pain upper left chest. On ASA 81 mg daily. In the past has been instructed to continue  her aspirin with no interruption prior to surgery. We have recommended that she hold up to DOS. Surgery team notified. Hypertension is managed with lisinopril but BP in low range 87/50s. Will hold on DOS. Heart failure - EF 35-40% (12/2016)-->Most recent echo 12/2020 EF 60-65%. Will take metoprolol on DOS. PVD- s/p AKA left 6/2016 and right 11/2016. Status post stent to left carotid. Carotid US and all testing above.   --Airway feasible. Has known sleep apnea and uses CPAP with 2 liters supplemental O2 nocturnally. Is using her inhalers and nebulizers as ordered. Smoking 10 cigarettes daily. No cough. Lungs CTA but diminished. Is bothered by the recent humidity. Albuterol neb ordered for DOS. Final decision by Anesthesia on DOS.  --GERD is managed with Protonix and will take on DOS. Has a chronic history of dysphagia and strictures. Has had multiple EGD procedures with dilation, none recent. Has known gastroparesis.  --Sickle cell trait. Taking iron for iron deficiency anemia.  Last Hgb was 9.9.  --Diabetes is managed with metformin. Hold metformin DOS. A1c 5.1.   --Seizure disorder - taking medications as ordered, but reports 4-5 petit mal seizures per day. Last grand mal in 2005.  +poor memory. History of CVA x 3. Residual loss of peripheral vision, left side weakness, dysphagia. BPPV. Peripheral neuropathy in hands.     --Chronic back pain. Has an intrathecal pump, scanned by Pharm D today. Was filled last week. Consider cautious positioning due to chronic pain and bilateral AKA. Will require careful transfers.    --Depression, anxiety and schizoaffective disorder. Will take AM meds.    --Legally blind, but notes she can see some shadows. Glaucoma.   --[place].    Arrival time, NPO, shower and medication instructions provided by nursing staff today.       Patient was discussed with Dr Wyatt. Will proceed.    VICTOR M Lepe CNS  Preoperative Assessment Center  [facility]  [place]  Phone: [phone]  Fax: [phone]

## 2021-07-20 NOTE — TELEPHONE ENCOUNTER
Pre Op Teaching Flowsheet                                        Pre and Post op Patient Education  Relevant Diagnosis: overactive bladder  Teaching Topic:  Pre and post op teaching for Botox injections  Person Involved in teaching: patient      Motivation Level: moderate  Asks Questions: yes  Eager to Learn:  Yes  Cooperative: Yes  Receptive (willing/able to accept information):  Yes  Patient demonstrates understanding of the following:  Date and time of surgery:07/22/21    Location of surgery: [place]  History and Physical and any other testing necessary prior to surgery Pre Op Physical with: PAC 7/20/21  Required time line for completion of History and Physical and any pre-op testing: No more than 30 days prior to surgery date    NPO Guidelines: NPO per Anesthesia Guidelines : Reviewed (surgery packet for further information).    Patient demonstrates understanding of the following:  Patient understands the need for a responsible adult to drive them home and someone to stay with them for the first 24 hours post-operatively: Nurse /care giver aware  Pre-op bowel prep: N/A  Pre-op showering/scrub information with Hibiclens Soap: Yes  Medications to take the day of surgery: Pre op Physical for instruction.   Blood thinner medications discussed and when to stop (if applicable):  Yes  Diabetes medication management (if applicable):  Patient to discuss with Primary Care Provider  Discussed pain control after surgery: pain scale, pain medications and pain management techniques  Infection Prevention: Patient demonstrates understanding of the following:  Patient instructed on hand hygiene:  Yes  Surgical procedure site care taught: Yes  Signs and symptoms of infection taught:  Yes  Wound care will be taught at the time of discharge.    Urine anylisis and Urine Culture ordered on: 7/20/21  Pre op Covid testing on: 07/20/21  Post-op follow-up: as needed  Discussed how to contact the hospital, nurse, and clinic scheduling  staff if necessary.     Surgical instructions given to patient in clinic: via phone. Instructional materials used/given/mailed: Before your surgery packet  Medications to avoid before surgery , Showering or Bathing instructions before surgery  and What to expect after surgery    Total time with patient: 10 minutes   Paulina Vargas RN

## 2021-07-20 NOTE — PATIENT INSTRUCTIONS
Preparing for Your Surgery      Name:  Sonya Foote   MRN:  [id number]   :  1950   Today's Date:  2021       Arriving for surgery:  Surgery date:  21  Arrival time:  5:30 am    Restrictions due to COVID 19:       One visitor is allowed in the Pre Op area. When you go into surgery, one visitor is allowed to wait in the Surgery Waiting Room       (provided there is enough space to social distance).   After surgery- Two visitors are allowed at a time if you have a private room and one visitor is allowed for those in a semi-private room.   Every 4 days the visitor(s) can rotate. During the 4 day period, the visitor(s) must be consistent. No visitors under the age of 18 years old.   Visiting Hours: 8 am - 8:30 pm   No ill visitors.   All visitors must wear face mask.    [employer] parking is available for anyone with mobility limitations or disabilities.  ([place]  24 hours/ 7 days a week; [facility]  7 am- 3:30 pm, Mon- Fri)    Please come to:     [facility] [address]  [address] [place] [address]     -    On arrival to hospital, you will be asked some screening questions and directed to Patient Registration. Patient Registration will then give you further instructions.  [phone]?     - ?If you are in need of directions, wheelchair or escort please stop at the Information Desk in the lobby.  Inform the information person that you are here for surgery; a wheelchair and escort to [address] will be provided.?     What can I eat or drink?  -  You may eat and drink normally for up to 8 hours before your surgery. (Until 11:30 pm 21)  -  You may have clear liquids until 2 hours before surgery. (Until 5:30 am)    Examples of clear liquids:  Water  Clear broth  Juices (apple, white grape, white cranberry  and cider) without pulp  Noncarbonated, powder based beverages  (lemonade and José Luis-Aid)  Sodas (Sprite, 7-Up, ginger ale and  charles)  Coffee or tea (without milk or cream)  Gatorade    -  No Alcohol for at least 24 hours before surgery     Which medicines can I take?   * Hold Multivitamins for 7 days before surgery.  Hold Supplements for 7 days before surgery.  Hold Ibuprofen (Advil, Motrin) for 1 day before surgery--unless otherwise directed by surgeon.  Hold Naproxen (Aleve) for 4 days before surgery.    Hold Diclofenac gel for 24 hours prior to surgery.    Patient may continue Aspirin until surgery. Do not take Aspirin the morning of surgery.    -  DO NOT take these medications the day of surgery:  Aspirin, Lisinopril, Miralax, Ferosul, Senexon-S, Vitamin D3, Ensure supplements,     -  PLEASE TAKE these medications the day of surgery:  Advair inhaler, Aspercreme patch, Alphagan eye drops, Trusopt eye drops, Escitalopram (Lexapro), Flonase nasal spray, Incruse Ellipta inhaler, Levetiracetam (Keppra), Loratadine (Claritin), Methocarbamol (Robaxin), Metoprolol, Nicotine patch, Pantoprazole (Protonix), Phenytoin (Dilantin), Pregabalin (Lyrica), Solifenacin (Vesicare),   Acetaminophen (Tylenol) if needed  Albuterol neb if needed    How do I prepare myself?  -  Bring inhalers to hospital.  - Please take 2 showers before surgery using Scrubcare or Hibiclens soap.    Use this soap only from the neck to your toes.     Leave the soap on your skin for one minute--then rinse thoroughly.      You may use your own shampoo and conditioner; no other hair products.   - Please remove all jewelry and body piercings.  - No lotions, deodorants or fragrance.  - No makeup or fingernail polish.   - Bring your ID and insurance card.    - All patients are required to have a Covid-19 test within 4 days of surgery/procedure.      -Patients will be contacted by the [facility] scheduling team within 1 week of surgery to make an appointment.      - Patients may call the Scheduling team at [phone] if they have not been scheduled within 4 days of   surgery.      ALL PATIENTS GOING HOME THE SAME DAY OF SURGERY ARE REQUIRED TO HAVE A RESPONSIBLE ADULT TO DRIVE AND BE IN ATTENDANCE WITH THEM FOR 24 HOURS FOLLOWING SURGERY.  Needs to have a responsible adult stay with her in her assisted living apartment.      Questions or Concerns:    - For any questions regarding the day of surgery or your hospital stay, please contact the Pre Admission Nursing Office at [phone].       - If you have health changes between today and your surgery please call your surgeon.       For questions after surgery please call your surgeons office.

## 2021-07-21 ENCOUNTER — PATIENT OUTREACH (OUTPATIENT)
Dept: UROLOGY | Facility: CLINIC | Age: 71
End: 2021-07-21

## 2021-07-21 DIAGNOSIS — Z01.812 PRE-PROCEDURE LAB EXAM: ICD-10-CM

## 2021-07-21 DIAGNOSIS — N39.41 URGE INCONTINENCE OF URINE: ICD-10-CM

## 2021-07-21 DIAGNOSIS — N32.81 OVERACTIVE BLADDER: ICD-10-CM

## 2021-07-21 NOTE — TELEPHONE ENCOUNTER
Arrival time change to accomodate patients ride service patient to arrive at hospital at 1015 am.  Verbal understanding from patient.  Paulina Vargas RN

## 2021-07-22 ENCOUNTER — ANESTHESIA (OUTPATIENT)
Dept: SURGERY | Facility: CLINIC | Age: 71
End: 2021-07-22
Payer: COMMERCIAL

## 2021-07-22 ENCOUNTER — HOSPITAL ENCOUNTER (OUTPATIENT)
Facility: CLINIC | Age: 71
Discharge: HOME | End: 2021-07-22
Attending: UROLOGY | Admitting: UROLOGY
Payer: COMMERCIAL

## 2021-07-22 VITALS
SYSTOLIC BLOOD PRESSURE: 146 MMHG | BODY MASS INDEX: 26.24 KG/M2 | WEIGHT: 113.4 LBS | TEMPERATURE: 97.7 F | HEART RATE: 61 BPM | OXYGEN SATURATION: 95 % | HEIGHT: 55 IN | RESPIRATION RATE: 8 BRPM | DIASTOLIC BLOOD PRESSURE: 77 MMHG

## 2021-07-22 DIAGNOSIS — E11.9 DIABETES MELLITUS TYPE 2 WITHOUT RETINOPATHY (H): ICD-10-CM

## 2021-07-22 DIAGNOSIS — N32.81 OVERACTIVE BLADDER: ICD-10-CM

## 2021-07-22 DIAGNOSIS — K21.9 GASTROESOPHAGEAL REFLUX DISEASE WITHOUT ESOPHAGITIS: ICD-10-CM

## 2021-07-22 DIAGNOSIS — N39.41 URGE INCONTINENCE OF URINE: ICD-10-CM

## 2021-07-22 DIAGNOSIS — Z86.79 HISTORY OF CORONARY ARTERY DISEASE: ICD-10-CM

## 2021-07-22 DIAGNOSIS — J30.9 ALLERGIC RHINITIS, UNSPECIFIED SEASONALITY, UNSPECIFIED TRIGGER: ICD-10-CM

## 2021-07-22 DIAGNOSIS — Z86.73 HISTORY OF STROKE: ICD-10-CM

## 2021-07-22 DIAGNOSIS — R11.0 NAUSEA: ICD-10-CM

## 2021-07-22 LAB
CREAT SERPL-MCNC: 0.58 MG/DL (ref 0.52–1.04)
GFR SERPL CREATININE-BSD FRML MDRD: >90 ML/MIN/1.73M2
GLUCOSE BLDC GLUCOMTR-MCNC: 75 MG/DL (ref 70–99)
GLUCOSE BLDC GLUCOMTR-MCNC: 85 MG/DL (ref 70–99)
GLUCOSE BLDC GLUCOMTR-MCNC: 85 MG/DL (ref 70–99)
HGB BLD-MCNC: 11.8 G/DL (ref 11.7–15.7)
POTASSIUM BLD-SCNC: 3.9 MMOL/L (ref 3.4–5.3)

## 2021-07-22 PROCEDURE — 250N000011 HC RX IP 250 OP 636: Performed by: NURSE ANESTHETIST, CERTIFIED REGISTERED

## 2021-07-22 PROCEDURE — 84132 ASSAY OF SERUM POTASSIUM: CPT | Performed by: ANESTHESIOLOGY

## 2021-07-22 PROCEDURE — 999N000141 HC STATISTIC PRE-PROCEDURE NURSING ASSESSMENT: Performed by: UROLOGY

## 2021-07-22 PROCEDURE — 52287 CYSTOSCOPY CHEMODENERVATION: CPT | Mod: GC | Performed by: STUDENT IN AN ORGANIZED HEALTH CARE EDUCATION/TRAINING PROGRAM

## 2021-07-22 PROCEDURE — 360N000075 HC SURGERY LEVEL 2, PER MIN: Performed by: UROLOGY

## 2021-07-22 PROCEDURE — 250N000011 HC RX IP 250 OP 636: Performed by: UROLOGY

## 2021-07-22 PROCEDURE — 250N000009 HC RX 250: Performed by: NURSE ANESTHETIST, CERTIFIED REGISTERED

## 2021-07-22 PROCEDURE — 82565 ASSAY OF CREATININE: CPT | Performed by: ANESTHESIOLOGY

## 2021-07-22 PROCEDURE — 36415 COLL VENOUS BLD VENIPUNCTURE: CPT | Performed by: ANESTHESIOLOGY

## 2021-07-22 PROCEDURE — 250N000009 HC RX 250: Performed by: CLINICAL NURSE SPECIALIST

## 2021-07-22 PROCEDURE — 258N000003 HC RX IP 258 OP 636: Performed by: CLINICAL NURSE SPECIALIST

## 2021-07-22 PROCEDURE — 85018 HEMOGLOBIN: CPT | Performed by: ANESTHESIOLOGY

## 2021-07-22 PROCEDURE — 272N000001 HC OR GENERAL SUPPLY STERILE: Performed by: UROLOGY

## 2021-07-22 PROCEDURE — 999N000054 HC STATISTIC EKG NON-CHARGEABLE

## 2021-07-22 PROCEDURE — 250N000020 HC RX IP 250 OP 636 J0585: Performed by: UROLOGY

## 2021-07-22 PROCEDURE — 93010 ELECTROCARDIOGRAM REPORT: CPT | Mod: 59 | Performed by: INTERNAL MEDICINE

## 2021-07-22 PROCEDURE — 370N000017 HC ANESTHESIA TECHNICAL FEE, PER MIN: Performed by: UROLOGY

## 2021-07-22 PROCEDURE — 710N000012 HC RECOVERY PHASE 2, PER MINUTE: Performed by: UROLOGY

## 2021-07-22 RX ORDER — SULFAMETHOXAZOLE/TRIMETHOPRIM 800-160 MG
1 TABLET ORAL 2 TIMES DAILY
Qty: 6 TABLET | Refills: 0 | Status: SHIPPED | OUTPATIENT
Start: 2021-07-22

## 2021-07-22 RX ORDER — ONDANSETRON 4 MG/1
4 TABLET, ORALLY DISINTEGRATING ORAL EVERY 30 MIN PRN
Status: DISCONTINUED | OUTPATIENT
Start: 2021-07-22 | End: 2021-07-27 | Stop reason: HOSPADM

## 2021-07-22 RX ORDER — HYDROMORPHONE HYDROCHLORIDE 1 MG/ML
.3-.5 INJECTION, SOLUTION INTRAMUSCULAR; INTRAVENOUS; SUBCUTANEOUS EVERY 5 MIN PRN
Status: DISCONTINUED | OUTPATIENT
Start: 2021-07-22 | End: 2021-07-27 | Stop reason: HOSPADM

## 2021-07-22 RX ORDER — TRIAMCINOLONE ACETONIDE 40 MG/ML
20 INJECTION, SUSPENSION INTRA-ARTICULAR; INTRAMUSCULAR
COMMUNITY
Start: 2021-02-17

## 2021-07-22 RX ORDER — FENTANYL CITRATE 50 UG/ML
INJECTION, SOLUTION INTRAMUSCULAR; INTRAVENOUS PRN
Status: DISCONTINUED | OUTPATIENT
Start: 2021-07-22 | End: 2021-07-22

## 2021-07-22 RX ORDER — HYDROMORPHONE HCL IN WATER/PF 6 MG/30 ML
.2-.5 PATIENT CONTROLLED ANALGESIA SYRINGE INTRAVENOUS EVERY 5 MIN PRN
Status: ACTIVE | OUTPATIENT
Start: 2021-07-22 | End: 2021-07-22

## 2021-07-22 RX ORDER — ONDANSETRON 2 MG/ML
4 INJECTION INTRAMUSCULAR; INTRAVENOUS EVERY 30 MIN PRN
Status: DISCONTINUED | OUTPATIENT
Start: 2021-07-22 | End: 2021-07-27 | Stop reason: HOSPADM

## 2021-07-22 RX ORDER — LABETALOL HYDROCHLORIDE 5 MG/ML
5 INJECTION, SOLUTION INTRAVENOUS
Status: DISCONTINUED | OUTPATIENT
Start: 2021-07-22 | End: 2021-07-27 | Stop reason: HOSPADM

## 2021-07-22 RX ORDER — SODIUM CHLORIDE, SODIUM LACTATE, POTASSIUM CHLORIDE, CALCIUM CHLORIDE 600; 310; 30; 20 MG/100ML; MG/100ML; MG/100ML; MG/100ML
INJECTION, SOLUTION INTRAVENOUS CONTINUOUS
Status: DISCONTINUED | OUTPATIENT
Start: 2021-07-22 | End: 2021-07-27 | Stop reason: HOSPADM

## 2021-07-22 RX ORDER — LIDOCAINE HYDROCHLORIDE 10 MG/ML
2 INJECTION, SOLUTION INFILTRATION; PERINEURAL
COMMUNITY
Start: 2021-02-17

## 2021-07-22 RX ORDER — ALBUTEROL SULFATE 0.83 MG/ML
2.5 SOLUTION RESPIRATORY (INHALATION) ONCE
Status: COMPLETED | OUTPATIENT
Start: 2021-07-22 | End: 2021-07-22

## 2021-07-22 RX ORDER — SODIUM CHLORIDE, SODIUM LACTATE, POTASSIUM CHLORIDE, CALCIUM CHLORIDE 600; 310; 30; 20 MG/100ML; MG/100ML; MG/100ML; MG/100ML
INJECTION, SOLUTION INTRAVENOUS CONTINUOUS
Status: DISCONTINUED | OUTPATIENT
Start: 2021-07-22 | End: 2021-07-22 | Stop reason: HOSPADM

## 2021-07-22 RX ORDER — TRAMADOL HYDROCHLORIDE 50 MG/1
50 TABLET ORAL
COMMUNITY
Start: 2021-04-27

## 2021-07-22 RX ORDER — HYDRALAZINE HYDROCHLORIDE 20 MG/ML
5 INJECTION INTRAMUSCULAR; INTRAVENOUS EVERY 10 MIN PRN
Status: DISCONTINUED | OUTPATIENT
Start: 2021-07-22 | End: 2021-07-27 | Stop reason: HOSPADM

## 2021-07-22 RX ORDER — NALOXONE HYDROCHLORIDE 0.4 MG/ML
0.2 INJECTION, SOLUTION INTRAMUSCULAR; INTRAVENOUS; SUBCUTANEOUS
Status: DISCONTINUED | OUTPATIENT
Start: 2021-07-22 | End: 2021-07-23 | Stop reason: HOSPADM

## 2021-07-22 RX ORDER — CLINDAMYCIN PHOSPHATE 900 MG/50ML
900 INJECTION, SOLUTION INTRAVENOUS
Status: COMPLETED | OUTPATIENT
Start: 2021-07-22 | End: 2021-07-22

## 2021-07-22 RX ORDER — PROPOFOL 10 MG/ML
INJECTION, EMULSION INTRAVENOUS CONTINUOUS PRN
Status: DISCONTINUED | OUTPATIENT
Start: 2021-07-22 | End: 2021-07-22

## 2021-07-22 RX ORDER — NALOXONE HYDROCHLORIDE 0.4 MG/ML
0.4 INJECTION, SOLUTION INTRAMUSCULAR; INTRAVENOUS; SUBCUTANEOUS
Status: DISCONTINUED | OUTPATIENT
Start: 2021-07-22 | End: 2021-07-23 | Stop reason: HOSPADM

## 2021-07-22 RX ORDER — OXYCODONE HYDROCHLORIDE 5 MG/1
5 TABLET ORAL EVERY 4 HOURS PRN
Status: DISCONTINUED | OUTPATIENT
Start: 2021-07-22 | End: 2021-07-27 | Stop reason: HOSPADM

## 2021-07-22 RX ORDER — CLINDAMYCIN PHOSPHATE 900 MG/50ML
900 INJECTION, SOLUTION INTRAVENOUS SEE ADMIN INSTRUCTIONS
Status: DISCONTINUED | OUTPATIENT
Start: 2021-07-22 | End: 2021-07-22 | Stop reason: HOSPADM

## 2021-07-22 RX ORDER — PROPOFOL 10 MG/ML
INJECTION, EMULSION INTRAVENOUS PRN
Status: DISCONTINUED | OUTPATIENT
Start: 2021-07-22 | End: 2021-07-22

## 2021-07-22 RX ORDER — LIDOCAINE 40 MG/G
CREAM TOPICAL
Status: DISCONTINUED | OUTPATIENT
Start: 2021-07-22 | End: 2021-07-22 | Stop reason: HOSPADM

## 2021-07-22 RX ORDER — MEPERIDINE HYDROCHLORIDE 25 MG/ML
12.5 INJECTION INTRAMUSCULAR; INTRAVENOUS; SUBCUTANEOUS
Status: DISCONTINUED | OUTPATIENT
Start: 2021-07-22 | End: 2021-07-27 | Stop reason: HOSPADM

## 2021-07-22 RX ORDER — FENTANYL CITRATE 50 UG/ML
25-50 INJECTION, SOLUTION INTRAMUSCULAR; INTRAVENOUS
Status: DISCONTINUED | OUTPATIENT
Start: 2021-07-22 | End: 2021-07-27 | Stop reason: HOSPADM

## 2021-07-22 RX ORDER — LIDOCAINE HYDROCHLORIDE 20 MG/ML
INJECTION, SOLUTION INFILTRATION; PERINEURAL PRN
Status: DISCONTINUED | OUTPATIENT
Start: 2021-07-22 | End: 2021-07-22

## 2021-07-22 RX ORDER — FENTANYL CITRATE 50 UG/ML
25-50 INJECTION, SOLUTION INTRAMUSCULAR; INTRAVENOUS EVERY 5 MIN PRN
Status: ACTIVE | OUTPATIENT
Start: 2021-07-22 | End: 2021-07-22

## 2021-07-22 RX ORDER — ONDANSETRON 2 MG/ML
INJECTION INTRAMUSCULAR; INTRAVENOUS PRN
Status: DISCONTINUED | OUTPATIENT
Start: 2021-07-22 | End: 2021-07-22

## 2021-07-22 RX ORDER — OXYCODONE HCL 5 MG/5 ML
5 SOLUTION, ORAL ORAL EVERY 4 HOURS PRN
Status: DISCONTINUED | OUTPATIENT
Start: 2021-07-22 | End: 2021-07-22

## 2021-07-22 RX ADMIN — FENTANYL CITRATE 50 MCG: 50 INJECTION, SOLUTION INTRAMUSCULAR; INTRAVENOUS at 12:10

## 2021-07-22 RX ADMIN — LIDOCAINE HYDROCHLORIDE 40 MG: 20 INJECTION, SOLUTION INFILTRATION; PERINEURAL at 12:14

## 2021-07-22 RX ADMIN — ONDANSETRON 4 MG: 2 INJECTION INTRAMUSCULAR; INTRAVENOUS at 12:36

## 2021-07-22 RX ADMIN — PROPOFOL 50 MCG/KG/MIN: 10 INJECTION, EMULSION INTRAVENOUS at 12:10

## 2021-07-22 RX ADMIN — PROPOFOL 20 MG: 10 INJECTION, EMULSION INTRAVENOUS at 12:16

## 2021-07-22 RX ADMIN — SODIUM CHLORIDE, POTASSIUM CHLORIDE, SODIUM LACTATE AND CALCIUM CHLORIDE: 600; 310; 30; 20 INJECTION, SOLUTION INTRAVENOUS at 11:53

## 2021-07-22 RX ADMIN — FENTANYL CITRATE 50 MCG: 50 INJECTION, SOLUTION INTRAMUSCULAR; INTRAVENOUS at 12:29

## 2021-07-22 RX ADMIN — ALBUTEROL SULFATE 2.5 MG: 2.5 SOLUTION RESPIRATORY (INHALATION) at 11:48

## 2021-07-22 RX ADMIN — CLINDAMYCIN IN 5 PERCENT DEXTROSE 900 MG: 18 INJECTION, SOLUTION INTRAVENOUS at 11:49

## 2021-07-22 ASSESSMENT — MIFFLIN-ST. JEOR: SCORE: 675.88

## 2021-07-22 NOTE — ANESTHESIA POSTPROCEDURE EVALUATION
Patient: Sonya Foote    Procedure(s):  CYSTOSCOPY, INJECT BOTOX INTO BLADDER    Diagnosis:Urge incontinence of urine [N39.41]  Overactive bladder [N32.81]  Diagnosis Additional Information: No value filed.    Anesthesia Type:  MAC    Note:  Disposition: Outpatient   Postop Pain Control: Uneventful            Sign Out: Well controlled pain   PONV: No   Neuro/Psych: Uneventful            Sign Out: Acceptable/Baseline neuro status   Airway/Respiratory: Uneventful            Sign Out: Acceptable/Baseline resp. status   CV/Hemodynamics: Uneventful            Sign Out: Acceptable CV status; No obvious hypovolemia; No obvious fluid overload   Other NRE: NONE   DID A NON-ROUTINE EVENT OCCUR? No           Last vitals:  Vitals Value Taken Time   BP     Temp     Pulse 63 07/22/21 1250   Resp 6 07/22/21 1250   SpO2 100 % 07/22/21 1250   Vitals shown include unvalidated device data.    Electronically Signed By: RODRIGUE RODRIGUEZ MD  July 22, 2021  12:51 PM

## 2021-07-22 NOTE — DISCHARGE INSTRUCTIONS
[facility]  Same-Day Surgery   Adult Discharge Orders & Instructions     For 24 hours after surgery    1. Get plenty of rest.  A responsible adult must stay with you for at least 24 hours after you leave the hospital.   2. Do not drive or use heavy equipment.  If you have weakness or tingling, don't drive or use heavy equipment until this feeling goes away.  3. Do not drink alcohol.  4. Avoid strenuous or risky activities.  Ask for help when climbing stairs.   5. You may feel lightheaded.  IF so, sit for a few minutes before standing.  Have someone help you get up.   6. If you have nausea (feel sick to your stomach): Drink only clear liquids such as apple juice, ginger ale, broth or 7-Up.  Rest may also help.  Be sure to drink enough fluids.  Move to a regular diet as you feel able.  7. You may have a slight fever. Call the doctor if your fever is over 100 F (37.7 C) (taken under the tongue) or lasts longer than 24 hours.  8. You may have a dry mouth, a sore throat, muscle aches or trouble sleeping.  These should go away after 24 hours.  9. Do not make important or legal decisions.   Call your doctor for any of the followin.  Signs of infection (fever, growing tenderness at the surgery site, a large amount of drainage or bleeding, severe pain, foul-smelling drainage, redness, swelling).    2. It has been over 8 to 10 hours since surgery and you are still not able to urinate (pass water).    3.  Headache for over 24 hours.    To contact a doctor, call Dr Jauregui's office at [phone] (clinic)  or:        [phone] and ask for the resident on call for Urology (answered 24 hours a day)      Emergency Department: [facility]: [phone]       (TTY for hearing impaired: [phone])

## 2021-07-22 NOTE — OP NOTE
Pre-operative diagnosis: Overactive bladder      Post-operative diagnosis: Overactive bladder      Procedure: Cystourethroscopy with injection of botulinum toxin A-200 units      Surgeon: Rosalia Christopher MD   Assistant(s): Cindy Fine MD      Anesthesia: MAC         Estimated blood loss: Less than 5 ml      Complications: None      Condition: Patient taken to recovery in stable condition.      Indications: Sonya Foote is a 71 year old female with functional incontinence due to bilateral AKAs and overactive bladder. She manages her bladder with a suprapubic tube. She has medical contraindications to both anticholinergics and myrbetriq. She understands the risks and benefits of the various options and elects to proceed with botulinum toxin injection understanding the risks for urinary obstruction, infection, pain, bleeding, need for future procedures and risks of anesthesia.     Procedure: Sonya Foote was taken to the operating room in her usual state of health. She was positioned in lithotomy. Exam under anesthesia was notable for clitoromegaly. Her genitalia were prepped and draped in the standard fashion. A time-out was performed to ensure proper patient, procedure and positioning. The patient received appropriate IV antibiotics prior to the procedure based on pre-operative urine culture.     The procedure was initiated with insertion of a rigid Almendarez ystoscope via urethra. The bladder mucosa appeared to be normal without stones, tumors or diverticulum on 360 degree inspection. Cysto findings included: normal mucosa     We mixed 2 vials of 100 U botulinum toxin A into 20 cc's of injectable saline for a total of 200 U. Bladder injection: We performed a template injection procedure into the bladder wall with a total of 15 injection sites. We then emptied the bladder to re-inspect our injection sites and found that there was a minimal amount of blood. The patient was returned to supine position and transported to  recovery in stable condition.     Plan: Schedule another Botox injection for 6 months. At the next injection, may increase to 300U in 10 mL if symptoms fail to improve or return sooner.      Cindy Fine MD  PGY-4 Urology  Pager [phone]      I performed the procedure with the assistance of Dr Fine. I was scrubbed for the entire case.   Rosalia Christopher MD

## 2021-07-22 NOTE — ANESTHESIA CARE TRANSFER NOTE
Patient: Sonya Foote    Procedure(s):  CYSTOSCOPY, INJECT BOTOX INTO BLADDER    Diagnosis: Urge incontinence of urine [N39.41]  Overactive bladder [N32.81]  Diagnosis Additional Information: No value filed.    Anesthesia Type:   MAC     Note:    Oropharynx: oropharynx clear of all foreign objects  Level of Consciousness: drowsy  Oxygen Supplementation: face mask  Level of Supplemental Oxygen (L/min / FiO2): 4  Independent Airway: airway patency satisfactory and stable  Dentition: dentition unchanged  Vital Signs Stable: post-procedure vital signs reviewed and stable  Report to RN Given: handoff report given  Patient transferred to: Phase II    Handoff Report: Identifed the Patient, Identified the Reponsible Provider, Reviewed the pertinent medical history, Discussed the surgical course, Reviewed Intra-OP anesthesia mangement and issues during anesthesia, Set expectations for post-procedure period and Allowed opportunity for questions and acknowledgement of understanding      Vitals:  Vitals Value Taken Time   BP     Temp     Pulse     Resp     SpO2         Electronically Signed By: VICTOR M DOWNEY CRNA  July 22, 2021  12:47 PM

## 2021-07-23 ENCOUNTER — MEDICAL CORRESPONDENCE (OUTPATIENT)
Dept: HEALTH INFORMATION MANAGEMENT | Facility: CLINIC | Age: 71
End: 2021-07-23

## 2021-07-24 LAB
BACTERIA UR CULT: ABNORMAL
BACTERIA UR CULT: ABNORMAL

## 2021-07-26 DIAGNOSIS — J44.9 CHRONIC OBSTRUCTIVE PULMONARY DISEASE, UNSPECIFIED COPD TYPE (H): ICD-10-CM

## 2021-07-26 DIAGNOSIS — N32.81 OVERACTIVE BLADDER: Primary | ICD-10-CM

## 2021-07-26 RX ORDER — CIPROFLOXACIN 2 MG/ML
400 INJECTION, SOLUTION INTRAVENOUS
Status: DISCONTINUED | OUTPATIENT
Start: 2021-07-26 | End: 2021-07-26 | Stop reason: HOSPADM

## 2021-07-26 NOTE — TELEPHONE ENCOUNTER
Please verify medications with patient as to if fully taking and if so need refills are needed.  It appears that some refills have not been done for some time thus unclear if I am providing for such.

## 2021-07-26 NOTE — TELEPHONE ENCOUNTER
Loratadine:  Routing refill request to provider for review/approval because:  Age > 64    Levetiracetam:  Routing refill request to provider for review/approval because:  Drug not on the [facility] refill protocol     Lisinopril:  Routing refill request to provider for review/approval because:  BP out of range    Phenytoin:  Routing refill request to provider for review/approval because:  Labs out of range:  CBC and Dilantin   Clarify sig / dose       Pantoprazole:  Routing refill request to provider for review/approval because:  Dx of osteoporosis on file, needs provider review    Metformin:   Routing refill request to provider for review/approval because:  Labs not current:  A1C

## 2021-07-27 DIAGNOSIS — Z86.73 HISTORY OF STROKE: ICD-10-CM

## 2021-07-27 LAB
ATRIAL RATE - MUSE: 68 BPM
DIASTOLIC BLOOD PRESSURE - MUSE: NORMAL MMHG
INTERPRETATION ECG - MUSE: NORMAL
P AXIS - MUSE: 44 DEGREES
PR INTERVAL - MUSE: 170 MS
QRS DURATION - MUSE: 94 MS
QT - MUSE: 418 MS
QTC - MUSE: 444 MS
R AXIS - MUSE: -9 DEGREES
SYSTOLIC BLOOD PRESSURE - MUSE: NORMAL MMHG
T AXIS - MUSE: 9 DEGREES
VENTRICULAR RATE- MUSE: 68 BPM

## 2021-07-27 RX ORDER — PANTOPRAZOLE SODIUM 40 MG/1
TABLET, DELAYED RELEASE ORAL
Qty: 28 TABLET | Refills: 11 | Status: SHIPPED | OUTPATIENT
Start: 2021-07-27

## 2021-07-27 RX ORDER — LISINOPRIL 20 MG/1
TABLET ORAL
Qty: 28 TABLET | Refills: 11 | Status: SHIPPED | OUTPATIENT
Start: 2021-07-27

## 2021-07-27 RX ORDER — LEVETIRACETAM 500 MG/1
TABLET ORAL
Qty: 56 TABLET | Refills: 11 | Status: SHIPPED | OUTPATIENT
Start: 2021-07-27

## 2021-07-27 RX ORDER — LORATADINE 10 MG/1
TABLET ORAL
Qty: 28 TABLET | Refills: 11 | Status: SHIPPED | OUTPATIENT
Start: 2021-07-27

## 2021-07-27 RX ORDER — PHENYTOIN SODIUM 200 MG/1
CAPSULE, EXTENDED RELEASE ORAL
Qty: 56 CAPSULE | Refills: 11 | OUTPATIENT
Start: 2021-07-27

## 2021-07-27 RX ORDER — ALBUTEROL SULFATE 0.83 MG/ML
SOLUTION RESPIRATORY (INHALATION)
Qty: 180 ML | Refills: 9 | Status: SHIPPED | OUTPATIENT
Start: 2021-07-27

## 2021-07-27 NOTE — TELEPHONE ENCOUNTER
Called home care nurse [place] [phone]- she states all medications match up to the assisted living except Dilantin- patient is not taking that medication.  verified medications with Home care nurse      Thank You,    [place] TAPIARN Triage  [facility]  [phone]

## 2021-07-28 RX ORDER — PHENYTOIN SODIUM 200 MG/1
CAPSULE, EXTENDED RELEASE ORAL
Qty: 56 CAPSULE | Refills: 11 | OUTPATIENT
Start: 2021-07-28

## 2021-08-02 VITALS — BODY MASS INDEX: 26.24 KG/M2 | WEIGHT: 113.4 LBS | HEIGHT: 55 IN

## 2021-08-02 ASSESSMENT — MIFFLIN-ST. JEOR: SCORE: 675.88

## 2021-08-02 NOTE — PROGRESS NOTES
"Sonya Foote is a 71 year old female being evaluated via a billable telephone visit.     \"This telephone visit will be conducted via a call between you and your physician/provider. We have found that certain health care needs can be provided without the need for an in-person visit or physical exam.  This service lets us provide the care you need with a telephone conversation.  If a prescription is necessary we can send it directly to your pharmacy.  If lab work is needed we can place an order for that and you can then stop by our lab to have the test done at a later time.\"    Telephone visits are billed at different rates depending on your insurance coverage.  Please reach out to your insurance provider with any questions.    Patient has given verbal consent for  a Telephone visit? Yes    What telephone number would you like your provider to contact at at:  [phone]    How would you like to obtain your AVS? 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       Sleep history:   10/26/2012 - Polysomnography MSI -  min; AHI 14; RDI 22; ERIN 5/hr; No PLMs or RBD.     11/16/2012 - Titration Polysomnography MSI - Started CPAP and developed treatment-emergent central apneas. Adaptive Servo-Ventilation titration optimal but did well on CPAP 8 as well (at least REM supine per comments).     Put on CPAP 8 cmH2O.     Initially presented to [facility] 2017 on CPAP 8 cmH20 and " elevated AHI 9.7 on download. Not a candidate for ASV due to reduced ejection fraction.     2/26/2017 - Titration Polysomnography [facility] (130#)  minutes. CPAP 5 - 12 cmH2O tried. Unacceptable titration due to high residual AHI, however, oxygen desaturations and events better controlled on CPAP of 10 cmH2O or higher. However AHI >20 and predominantly obstructive events were scored.     She was changed to CPAP 12 cmH2O.     She received a new machine 7/2019 and lost it due to non-compliance     Echo 11/2019   Global and regional left ventricular function normal. EF of 60-65%.   Pulmonary artery systolic pressure is normal.     Study Date: 11/20/2019 (130.0 lbs) Titrated CPAP 5 to 12 cmH2O. Supine REM seen at 11 cmH20 with fair control of events, but persistent RERAs, snoring, and airflow limitation. Events often appeared periodic, cheyyne-fang morphology was suggested at times, but periodicity was not well consolidated and circulation times were not delayed.     Recommend treatment of JOSE with Autotitrating PAP therapy with a range of 12 cmH2O to 15 cmH2O     She lost her machine due to non-compliance     She has had difficulty with compliance due to short sleep times and insomnia. Usual sleep time short (5-6 hours) under best of circumstances related to chronic pain. Insomnia appears to be multifactorial with components of psychophysiologic insomnia, pain, and disrupted circadian rhythm as well as poor sleep hygiene. She saw Dr. Campos once in 10/2020.     Due to Covid 19 pandemic she was unable to repeat a titration study until 3/31/21     Study Date: 3/31/2021 (125.0 lbs)   -  Patient slept supine with 4 pillows   - Titrated at pressures ranging from 10/7 cmH2O up to Bivevel 11/7/0 cmH2O. Due to emergent central events she was switched to ASV. The final pressure achieved was ASV 10/0/20 cmH2O with a residual AHI of 65.1 events per hour. Events were predominantly central apneas  and hypopneas with intermittent/poorly consolidated periodic pattern.   - Transcutaneous carbon dioxide monitoring used, however no clear baseline was achieved prior to onset. Baseline after sleep onset was 50mmHg.  Highest TCM was 57.1 mmHg and 113.7 minutes was spent at or greater than 55 mmHg.   - Lowest oxygen saturation 86.6%. Time spent less than or equal to 89% was 1.8 minutes.       Elected trial of treatment with Bilevel 10/7 cmh2O   - Consider repeat ASV titration with attention to proper positioning   -  Check VBG (not completed)    Overall, she rates the experience with PAP as pretty good  She falls asleep easily  Water gets empty, filled by aides due to blindness  She does have some dryness  She sometimes falls asleep without putting it on  She gets meds at 930 and 5 AM  She usually falls back asleep okay after her medications  She naps everyday, but usually falls asleep before she can put mask on  Mask is usually on when she wakes up  Sleep is disrupted by visits from workers at assisted living    She reports sleep onset, maintenance difficulties are improved  - She has not been watchingTV in bed   - She is smoking less at night (3 nights a week).    Her PAP interface is Full Face Mask.  She likes her mask     Bedtime is typically 9. Wake time is typically 9-10 AM.      Total score - [place]: 15 (8/2/2021 11:00 AM)    ResMed   Bilevel PAP 10/7 cmH2O 30 day usage data:  30% of days with > 4 hours of use. 5/30 days with no use.   Average use 160 minutes per day.   95%ile Leak 44.12 L/min.   AHI 16.07 events per hour. ERIN 7.7  Predominantly central and unknown events      Past medical/surgical history, family history, social history, medications and allergies were reviewed.      Problem List:  Patient Active Problem List    Diagnosis Date Noted     History of stroke      Priority: High     residual left-sided weakness       CAD (coronary artery disease)      Priority: High     UA and inferior MI in 2016  s/p PCI       S/P AKA (above knee amputation) bilateral (H) 06/19/2019     Priority: High     s/p left above-the-knee amputation for peripheral vascular disease and thrombosis on 06/06/2016 and a right above-the-knee amputation for peripheral arterial disease on 11/23/2016.        Chronic pain syndrome 03/20/2009     Priority: High     Has a fentanyl PUMP    Patient is followed by Allan Casey for ongoing prescription of pain meds  All refills should be approved by this provider, or covering partner.    Maximum quantity per month: 1 month  Clinic visit frequency required: Q 6  months     Controlled substance agreement:  Encounter-Level CSA:     There are no encounter-level csa.        Pain Clinic evaluation in the past: No    DIRE Total Score(s):  No flowsheet data found.    Last [facility] website verification:  6/6/18   [email or web address]       Type 2 diabetes mellitus (H) 04/27/2021     Priority: Medium     Pressure injury of right lower back, stage 2 (H) 01/12/2021     Priority: Medium     Morbid obesity (H) 05/14/2020     Priority: Medium     'COPD' (chronic obstructive pulmonary disease) (H)      Priority: Medium     PFTS 9/2020 - FEV1- 2.46 (109%), ratio 0.84, DLCOunc 63% not suggestive of COPD       Benign essential hypertension      Priority: Medium     Sickle cell trait (H)      Priority: Medium     MDD (major depressive disorder)      Priority: Medium     Seizure disorder (H)      Priority: Medium     many years since last grand mal; daily, brief petit mals       ROGER (generalized anxiety disorder)      Priority: Medium     Person who has had sex change operation      Priority: Medium     male to female       PAD (peripheral artery disease) (H)      Priority: Medium     JOSE (obstructive sleep apnea)      Priority: Medium     Complex sleep apnea syndrome      Priority: Medium     Other cystostomy status (H) 11/04/2019     Priority: Medium     Urge incontinence of urine 02/24/2021     Priority: Low  "    Overactive bladder 02/24/2021     Priority: Low     Dizziness 12/26/2020     Priority: Low     Arthropathy of cervical facet joint 10/16/2020     Priority: Low     Chronic insomnia 09/01/2020     Priority: Low     Neurogenic bladder 05/21/2020     Priority: Low     History of blood transfusion      Priority: Low     x5; no adverse reactions       Type 2 diabetes mellitus with other circulatory complication, without long-term current use of insulin (H)      Priority: Low     Chronic back pain      Priority: Low     Chronic neck pain      Priority: Low     Osteoporosis      Priority: Low     Peripheral neuropathy      Priority: Low     Blind left eye      Priority: Low     due to central retinal artery occlusion       Acid reflux disease      Priority: Low     Hx of carotid artery stenosis      Priority: Low     s/p left carotid stent in 2006       Dysphagia      Priority: Low     chronic due to esophageal strictures and multiple previous dilitations        History of central retinal artery occlusion 2006     Priority: Low     left-sided 2006          Ht 1.092 m (3' 6.99\")   Wt 51.4 kg (113 lb 6.4 oz)   BMI 43.14 kg/m      Impression/Plan:     Complex patient. Multiple comorbidities.     Complex sleep apnea   Mild Sleep obstructive sleep apnea by sleep study 2012.  History of complex apnea with 'treatment emergent central apneas' by titration study 2012. Treated with initially ASV, later PAP after EF worsened. Study 2/2017 suggested severe incompletely treated obstructive sleep apnea at CPAP 12 cmH20. Subsequent study 11/2019 with acceptable titration, but probable underlying periodic breathing.       Most recent study 3/2021 with unacceptable titration. Patient appeared best at 10/7 cmH2o. Obstructive events were adequately treated at this pressure, however patient developed central apneas and hypopneas later on the study. Use of ASV was ineffective. It appeared that the ASV algorithm was inappropriately " dropping pressure support and events re-occurred when pressure support was dropped. This may be related to machine malfunction or may possibly related to poor positioning with patient on 4 pillows with fairly extreme neck flexion. Possible chronic hypercarbia  Titration complicated by high leak, especially on ASV    Not adhering to CPAP well due to uncertain reasons, falling asleep without mask, mask coming off.   She feels her breathing is better with Bilevel. Per DME she has not met compliance.   - Check VBG (previously ordered)  - Consider repeat ASV titration with attention to proper positioning or trial of ASV  - Put mask on after getting meds at 930 PM  - Put mask on whenever you get into bed even for naps  - Have aides remind her to put mask back on when they see her at night  - Have an aide fill humidifier in night  - Called Nursing station with recommendations ([phone]) (fax [phone])     Chronic insomnia  Suspect Psychophysiologic insomnia, pain, poor sleep hygiene and disrupted circadian rhythm all playing a role. We have  Made  similar recomendations at every visit for which she forgets or is noncompliant. Currently she reports sleep onset, maintenance difficulties are improved since starting bilevel. She wakes up and has difficulty falling back to sleep every other night, usually related to pain.   - Recommend restricting time in bed to 8 hours 9 PM - 7 AM  - Recommend no smoking at night       Sonya Foote will follow up in about 3 month(s).     I spent 20 minutes with patient including counseling, and 30 minutes with chart review, and documentation and coordination of care

## 2021-08-03 ENCOUNTER — VIRTUAL VISIT (OUTPATIENT)
Dept: SLEEP MEDICINE | Facility: CLINIC | Age: 71
End: 2021-08-03
Payer: COMMERCIAL

## 2021-08-03 DIAGNOSIS — F17.200 TOBACCO USE DISORDER: ICD-10-CM

## 2021-08-03 DIAGNOSIS — G47.39 COMPLEX SLEEP APNEA SYNDROME: ICD-10-CM

## 2021-08-03 DIAGNOSIS — G47.33 OSA (OBSTRUCTIVE SLEEP APNEA): ICD-10-CM

## 2021-08-03 DIAGNOSIS — F51.04 CHRONIC INSOMNIA: ICD-10-CM

## 2021-08-03 PROCEDURE — 99215 OFFICE O/P EST HI 40 MIN: CPT | Mod: TEL | Performed by: INTERNAL MEDICINE

## 2021-08-05 DIAGNOSIS — Z53.9 DIAGNOSIS NOT YET DEFINED: Primary | ICD-10-CM

## 2021-08-05 PROCEDURE — G0179 MD RECERTIFICATION HHA PT: HCPCS | Performed by: INTERNAL MEDICINE

## 2021-08-09 ENCOUNTER — LAB REQUISITION (OUTPATIENT)
Dept: LAB | Facility: CLINIC | Age: 71
End: 2021-08-09
Payer: COMMERCIAL

## 2021-08-09 DIAGNOSIS — Z11.59 ENCOUNTER FOR SCREENING FOR OTHER VIRAL DISEASES: ICD-10-CM

## 2021-08-11 PROCEDURE — U0003 INFECTIOUS AGENT DETECTION BY NUCLEIC ACID (DNA OR RNA); SEVERE ACUTE RESPIRATORY SYNDROME CORONAVIRUS 2 (SARS-COV-2) (CORONAVIRUS DISEASE [COVID-19]), AMPLIFIED PROBE TECHNIQUE, MAKING USE OF HIGH THROUGHPUT TECHNOLOGIES AS DESCRIBED BY CMS-2020-01-R: HCPCS | Mod: ORL | Performed by: FAMILY MEDICINE

## 2021-08-12 LAB — SARS-COV-2 RNA RESP QL NAA+PROBE: NEGATIVE

## 2021-08-16 ENCOUNTER — LAB REQUISITION (OUTPATIENT)
Dept: LAB | Facility: CLINIC | Age: 71
End: 2021-08-16
Payer: COMMERCIAL

## 2021-08-16 DIAGNOSIS — Z11.59 ENCOUNTER FOR SCREENING FOR OTHER VIRAL DISEASES: ICD-10-CM

## 2021-08-18 PROCEDURE — U0003 INFECTIOUS AGENT DETECTION BY NUCLEIC ACID (DNA OR RNA); SEVERE ACUTE RESPIRATORY SYNDROME CORONAVIRUS 2 (SARS-COV-2) (CORONAVIRUS DISEASE [COVID-19]), AMPLIFIED PROBE TECHNIQUE, MAKING USE OF HIGH THROUGHPUT TECHNOLOGIES AS DESCRIBED BY CMS-2020-01-R: HCPCS | Mod: ORL | Performed by: FAMILY MEDICINE

## 2021-08-19 LAB — SARS-COV-2 RNA RESP QL NAA+PROBE: NEGATIVE

## 2021-08-24 ENCOUNTER — LAB REQUISITION (OUTPATIENT)
Dept: LAB | Facility: CLINIC | Age: 71
End: 2021-08-24
Payer: COMMERCIAL

## 2021-08-24 DIAGNOSIS — Z11.59 ENCOUNTER FOR SCREENING FOR OTHER VIRAL DISEASES: ICD-10-CM

## 2021-08-25 LAB — SARS-COV-2 RNA RESP QL NAA+PROBE: NEGATIVE

## 2021-08-25 PROCEDURE — U0005 INFEC AGEN DETEC AMPLI PROBE: HCPCS | Mod: ORL | Performed by: FAMILY MEDICINE

## 2021-08-27 ENCOUNTER — TELEPHONE (OUTPATIENT)
Dept: PULMONOLOGY | Facility: CLINIC | Age: 71
End: 2021-08-27

## 2021-08-27 DIAGNOSIS — J45.901 MILD ASTHMA WITH EXACERBATION, UNSPECIFIED WHETHER PERSISTENT: Primary | ICD-10-CM

## 2021-08-27 NOTE — TELEPHONE ENCOUNTER
[facility] Call Center    Phone Message    May a detailed message be left on voicemail: yes     Reason for Call: Symptoms or Concerns     If patient has red-flag symptoms, warm transfer to triage line    Current symptom or concern: Increase in difficulty breathing.  She is wondering if she should have any testing done prior to her appt on 11/24    Symptoms have been present for:  2 month(s)    Has patient previously been seen for this? Yes    By : Dr. Barroso    Date: 4/2021    Are there any new or worsening symptoms? No      Action Taken: Message routed to:  [facility] ([facility]    Travel Screening: Not Applicable

## 2021-08-27 NOTE — TELEPHONE ENCOUNTER
"Addendum to RN note. Zpac, prednisone ordered. QTc recently WNL. Likely asthma exacerbation but with treat with azithromycin given the fever, possible bacterial pna, and for anti-inflammatory benefit. If not better by days 2-3 then go to primary care or ED.     Rl Barroso MD  Pulmonary & Critical Care Fellow    -------------------------------    Called patient to gather more information,. When she goes outside, she begins having hard time breathing. Temp has been in mid 99's.Patient c/o night sweats, chills, SOB, productive cough with yellow sputum and wheezing. Humidity makes it worse. Patient takes prescribed inhalers as directed every day and kadiy began using her rescue inhaler (three times so far) and has been using nebs for awhile.The needs do seem to help some. RN did tell patient that since it is so late on a Friday, the massage may not be answered until early next week. Patient is agreeable and says she \"isn't worried about it\". RN advised patient to seek urgent or emergency care should her symptoms worsen and patient agrees. RN will pass information to provider to advise.  "

## 2021-08-30 RX ORDER — PREDNISONE 20 MG/1
40 TABLET ORAL DAILY
Qty: 10 TABLET | Refills: 0 | Status: SHIPPED | OUTPATIENT
Start: 2021-08-30 | End: 2021-08-31

## 2021-08-30 RX ORDER — AZITHROMYCIN 250 MG/1
TABLET, FILM COATED ORAL
Qty: 6 TABLET | Refills: 0 | Status: SHIPPED | OUTPATIENT
Start: 2021-08-30 | End: 2021-08-31

## 2021-08-30 NOTE — TELEPHONE ENCOUNTER
Called patient to notify of medication called into pharmacy and to make sure it is the correct one. Also called to instruct patient to contact primary care or go to ED if she is not noticing any improvement after two days on medication, as she will likely need further work up per provider. No answer, so RN left message (ok per prior phone message) and asked patient to return call to nurse line to confirm pharmacy, instruction and with any further questions.

## 2021-08-31 DIAGNOSIS — M81.0 AGE-RELATED OSTEOPOROSIS WITHOUT CURRENT PATHOLOGICAL FRACTURE: ICD-10-CM

## 2021-08-31 RX ORDER — PREDNISONE 20 MG/1
40 TABLET ORAL DAILY
Qty: 10 TABLET | Refills: 0 | Status: SHIPPED | OUTPATIENT
Start: 2021-08-31

## 2021-08-31 RX ORDER — AZITHROMYCIN 250 MG/1
TABLET, FILM COATED ORAL
Qty: 6 TABLET | Refills: 0 | Status: SHIPPED | OUTPATIENT
Start: 2021-08-31 | End: 2021-09-05

## 2021-08-31 NOTE — TELEPHONE ENCOUNTER
Called patient earlier in the morning to ensure that medications were picked up. Patient said she had a neurology appointment in [place], so she wanted to have the prednisone and azithromycin and sent to the [facility]'s to that location and to let her know address. RN found closest location and sent scripts there. When RN called to let her know address, she said she will no longer be going there to pick them up there as her ride did not show up. RN asked patient where else to send medications and patient said she didn't know. She asked that RN call back in an hour. RN did cancel scripts at [facility]. When RN did call [facility] back, phone went straight to voicemail. Message was left leaving nurse number for patient to call back with new pharmacy information. RN will call back later today if no return call is received.

## 2021-09-01 ENCOUNTER — LAB REQUISITION (OUTPATIENT)
Dept: LAB | Facility: CLINIC | Age: 71
End: 2021-09-01
Payer: COMMERCIAL

## 2021-09-01 DIAGNOSIS — Z11.59 ENCOUNTER FOR SCREENING FOR OTHER VIRAL DISEASES: ICD-10-CM

## 2021-09-01 RX ORDER — ALENDRONATE SODIUM 70 MG/1
TABLET ORAL
Qty: 4 TABLET | Refills: 97 | Status: SHIPPED | OUTPATIENT
Start: 2021-09-01

## 2021-09-01 NOTE — TELEPHONE ENCOUNTER
Left message asking patient to leave pharmacy information on nurse number to get medication called in for her. Last attempt before closing encounter.

## 2021-09-01 NOTE — TELEPHONE ENCOUNTER
Failed protocol.  please route to  team if patient needs an appointment     Madison TAPIARN BSN  [facility]  [phone]

## 2021-09-03 PROCEDURE — U0005 INFEC AGEN DETEC AMPLI PROBE: HCPCS | Mod: ORL | Performed by: FAMILY MEDICINE

## 2021-09-04 LAB — SARS-COV-2 RNA RESP QL NAA+PROBE: NEGATIVE

## 2021-09-07 ENCOUNTER — TELEPHONE (OUTPATIENT)
Dept: PULMONOLOGY | Facility: CLINIC | Age: 71
End: 2021-09-07

## 2021-09-07 NOTE — TELEPHONE ENCOUNTER
Spoke with pt about rescheduling deacon appt in late November. Pt wants to be called tomorrow morning about the reschedule.

## 2021-09-08 ENCOUNTER — TRANSFERRED RECORDS (OUTPATIENT)
Dept: HEALTH INFORMATION MANAGEMENT | Facility: CLINIC | Age: 71
End: 2021-09-08

## 2021-09-08 ENCOUNTER — LAB REQUISITION (OUTPATIENT)
Dept: LAB | Facility: CLINIC | Age: 71
End: 2021-09-08
Payer: COMMERCIAL

## 2021-09-08 ENCOUNTER — TELEPHONE (OUTPATIENT)
Dept: PULMONOLOGY | Facility: CLINIC | Age: 71
End: 2021-09-08

## 2021-09-08 DIAGNOSIS — Z11.59 ENCOUNTER FOR SCREENING FOR OTHER VIRAL DISEASES: ICD-10-CM

## 2021-09-08 NOTE — TELEPHONE ENCOUNTER
Spoke with pt regarding rescheduling 11/24 appt with Dr. Barroso as he is unavailable that day. Next opening offered on 12/8 and pt was agreeable; details confirmed with pt.

## 2021-09-12 DIAGNOSIS — J44.9 CHRONIC OBSTRUCTIVE PULMONARY DISEASE, UNSPECIFIED COPD TYPE (H): ICD-10-CM

## 2021-09-14 ENCOUNTER — LAB REQUISITION (OUTPATIENT)
Dept: LAB | Facility: CLINIC | Age: 71
End: 2021-09-14
Payer: COMMERCIAL

## 2021-09-14 DIAGNOSIS — Z11.59 ENCOUNTER FOR SCREENING FOR OTHER VIRAL DISEASES: ICD-10-CM

## 2021-09-14 NOTE — TELEPHONE ENCOUNTER
Not sure who is the PCP but the patient's pharmacy is [facility] and the PCP listed is out of [facility]?    Ky Cosby MD    [address] [place] [address] [phone] (Work)    [phone] (Fax)      Previously patient saw Dr. Sanon?    Please call patient or PCP listed above to see who she is using for PCP so we can route correctly.    Laureen Solano RN  [facility]

## 2021-09-15 PROCEDURE — U0003 INFECTIOUS AGENT DETECTION BY NUCLEIC ACID (DNA OR RNA); SEVERE ACUTE RESPIRATORY SYNDROME CORONAVIRUS 2 (SARS-COV-2) (CORONAVIRUS DISEASE [COVID-19]), AMPLIFIED PROBE TECHNIQUE, MAKING USE OF HIGH THROUGHPUT TECHNOLOGIES AS DESCRIBED BY CMS-2020-01-R: HCPCS | Mod: ORL

## 2021-09-15 PROCEDURE — U0005 INFEC AGEN DETEC AMPLI PROBE: HCPCS | Mod: ORL | Performed by: FAMILY MEDICINE

## 2021-09-16 ENCOUNTER — HOSPITAL ENCOUNTER (OUTPATIENT)
Dept: OCCUPATIONAL THERAPY | Facility: CLINIC | Age: 71
Setting detail: THERAPIES SERIES
End: 2021-09-16
Attending: OPHTHALMOLOGY
Payer: COMMERCIAL

## 2021-09-16 PROCEDURE — 97535 SELF CARE MNGMENT TRAINING: CPT | Mod: GO | Performed by: OCCUPATIONAL THERAPIST

## 2021-09-16 PROCEDURE — 97166 OT EVAL MOD COMPLEX 45 MIN: CPT | Mod: GO | Performed by: OCCUPATIONAL THERAPIST

## 2021-09-16 ASSESSMENT — VISUAL ACUITY: OS: NLP

## 2021-09-16 ASSESSMENT — ACTIVITIES OF DAILY LIVING (ADL): PRIOR_ADL/IADL_STATUS: IMPAIRED PRIOR TO ONSET

## 2021-09-16 NOTE — PROGRESS NOTES
[facility]          OUTPATIENT OCCUPATIONALTHERAPY  EVALUATION  PLAN OF TREATMENT FOR OUTPATIENT REHABILITATION  (COMPLETE FOR INITIAL CLAIMS ONLY)  Patient's Last Name, First Name, M.I.  YOB: 1950  Sonya Foote      Provider's Name  [facility] Medical Record No.  [id number]   Onset Date:  6/9/21 - date of order Start of Care Date:  09/16/21   Type:     ___PT  _X_OT   ___SLP Medical Diagnosis:  Primary open angle glaucoma of both eyes, severe stage H40.1133  - Primary    Therapy Diagnosis: Impaired ADL/IADL with deficits in Reading based ADL, Written communication, Dressing, Grooming, Eating (mobility)  Decreased vision, neuropathy in hands, decreased overall strength, decreased mobility, pain Visits from SOC: 1     _________________________________________________________________________________  Plan of Treatment/Functional Goals:  Planned Interventions: Low vision compensatory training for reading, Low vision compensatory training for written communication, Low vision compensatory training for object identification, Instruction in environmental adaptations for glare, Instruction in environmental adaptations for contrast, Instruction in environmental adaptations for lighting, Optical device/ADL device instruction and training, Computer modifications        Goals  1.      Patient will demonstrate 3 pieces of adaptive equipment and/or adaptive techniques in regards to magnification, lighting, contrast and glare for increased ADL/IADL independence with near vision tasks (reading, writing).    Target Date: 12/09/21      2.                  3.       Patient will demonstrate and/or verbalize 3 environmentally-based ADL modifications to improve visual-based ADL/IADL activities.    Target Date: 12/09/21      4. Patient will verbalize awareness of community resources for  the following:, Audio access to print materials, Access to large print materials, Access to low vision devices         Target Date: 12/09/21      5.                   6.                    7.                 8.                            Therapy Frequency/Duration:  5 visits over a 12-week period, no more than 1 time per week    Elizabeth Orellana OT       I CERTIFY THE NEED FOR THESE SERVICES FURNISHED UNDER        THIS PLAN OF TREATMENT AND WHILE UNDER MY CARE     (Physician co-signature of this document indicates review and certification of the therapy plan).                  Certification date from: 09/16/21 Certification date to: 12/09/21          Referring Physician: Mel Burnett MD     Initial Assessment        See Epic Evaluation Start Of Care Date: 09/16/21     Elizabeth Orellana OT

## 2021-09-16 NOTE — PROGRESS NOTES
"   09/16/21 0900   Visit Type   Type of Visit Initial   General Information   Start Of Care Date 09/16/21   Referring Physician Mel Burnett MD   Orders Evaluate And Treat As Indicated   Orders Comment low vision   Date of Order 06/09/21   Medical Diagnosis Primary open angle glaucoma of both eyes, severe stage H40.1133  - Primary    Onset Of Illness/injury Or Date Of Surgery 6/9/21 - date of order   Surgical/Medical history reviewed Yes   Precautions/Limitations falls   Additional Occupational Profile Info/Pertinent History of Current Problem PCIOL OD (vision loss in the right eye predates 2013 - pt believes she lost all vision in 2005), Diabetic retinopathy, 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, CAD, chronic neck and back pain, chronic pain syndrome, COPD, ROGER, stroke wiht residual L sided weakness, MDD, JOSE, PAD, peripheral neuropathy, type 2 DM, L AKA, R BKA, see chart for other   Prior ADL/IADL status Impaired prior to onset   Patient/family Goals Statement Maximize what she is able to do with her vision   General information comments per patient report: can only see out of R eye - can't see peripheral on either; L eye stroke - can't see out of at all   Social History/Home Environment   Living Environment Assisted living   Current Community Support Family/friend caregiver  (son in [place])   Patient Role/employment History  Retired   Avocational \"go out and hang out wth friends - smoke cigartettes, arts and crafts, little hobbies that I can do anymore\" - due to vision; has audio book player   Social/Environment Comment  for 4 years; has an [employer] worker - helps with bills; has assist at AL for all medication management, cooking/meals, laundry, cleaning; uses transportation through her insurance   Pain Assessment   Pain Reported Yes   Pain Location legs, back, arms, \"all over\"   Pain Scale 7/10   Comments DJD in spine - takes bolus of meds (pump) hourly and lyrica for nerve pain - double amputee   Fall " Risk Screen   Have you fallen 2 or more times in the past year? Yes   Have you fallen and had an injury in the past year? Yes  (sore on L stump - took months to heal)   Is patient a fall risk? Yes   Abuse Screen (yes response referral indicated)   Feels Unsafe at Home or Work/School no   Feels Threatened by Someone no   Cognitive/Behavioral   Communication   (slightly dysarthric)   Cognitive Status Intact for evaluation process   Behavior Appropriate   Physical Status/Equipment   Physical Status   (L AKA, R BKA)   Mobility equipment used Wheelchair  (power (able to operate) and SEC as a visual guide in front)   Physical Status/Equipment comments arthritis in hands - neuropathy (wearing copper hands gloves), soft diet (dysphagia after stroke), can generally dress self and undress but staff there to assist if needed - patient is color blind; has assist for bathing; learning to use new prosthetics to walk - gets PT 2x/week   Visual Report   Functional Complaints Reading;Writing;Homemaking;Safety in mobility   Visual Complaints Constricted visual fields right eye;Constricted visual fields left eye;Phantom vision;Visual fatigue;Difficulty maintaining focus;Double vision;Light sensitivity   Mike Bonnet Symptoms? Yes   Magnifier (strength and type) has headborne magnifier - 5x magnification - got at the [employer]; has handheld magnifier through [facility] but too tiny   Reading glasses Single Lens  (distance glasses, separate reading glasses)   Technology   (iphone, getting an ipad in a couple months (had old one))   Equipment comments has smart speaker/Tasha, uses Dayami some on phone; has 55 inch smart TV - seeing quite well; has audio book player; getting a new ipad in a couple months through  (other one  - checked email on ipad, voicemails, etc.); is already connected with [facility] - hasn't talked to them since covid   Lighting and Glare   Is your lighting adequate?   (uses lamps  only at night, doesn't turn overhead on)   Is glare a problem? Yes/ indoors;Yes/ outdoors   Are you satisfied with your sunglasses? No  (wears all the time)   Sunglass filter color Gray  (medium tint)   Visual Acuity   Acuity right eye 20/200 base eye exam distance cc; with manifest refraction: 20/100   Acuity left eye NLP   Acuity both eyes together above per 5/10/21 visit with eye MD   Contrast Sensitivity   Contrast sensitivity (score/25) 10/25   Preferred Retinal Locus   Right eye   (clock face: missing lower)   Right eye eccentric viewing position   (none)   Left eye   (N/A - NLP)   MN Read   Smallest print size read with readers over distance glasses and dim lights (preferred): 6.3M   Critical print size with readers over distance glasses and dim lights (preferred): 8M   Words per minute at critical print size ~35wpm   Clinical Impression, OT Eval   Criteria for Skilled Therapeutic Interventions Met yes;treatment indicated   Therapy  Diagnosis: Impaired ADL/IADL with deficits in Reading based ADL;Written communication;Dressing;Grooming;Eating  (mobility)   Impairment comments Decreased vision, neuropathy in hands, decreased overall strength, decreased mobility, pain   Assessment of Occupational Performance 5 or more Performance Deficits   Identified Performance Deficits Reading based ADL;Written communication;Dressing;Grooming;Eating; mobility   Clinical Decision Making (Complexity) Moderate complexity   OT Visual Rehabilitation Evaluation Plan   Therapy Plan Occupational therapy intervention   Planned Interventions Low vision compensatory training for reading;Low vision compensatory training for written communication;Low vision compensatory training for object identification;Instruction in environmental adaptations for glare;Instruction in environmental adaptations for contrast;Instruction in environmental adaptations for lighting;Optical device/ADL device instruction and training;Computer modifications    Frequency / Duration 5 visits over a 12-week period, no more than 1 time per week   Risks and Benefits of Treatment have been explained. Yes   Patient, Family in agreement with plan of care Yes   GOALS   Goals Near Vision;Environmental Modification;Resource Education   Goal 1 - Near Vision   Near Vision Goal Comment Patient will demonstrate 3 pieces of adaptive equipment and/or adaptive techniques in regards to magnification, lighting, contrast and glare for increased ADL/IADL independence with near vision tasks (reading, writing).   Target Date 12/09/21   Goal 3 - Environmental modification   Environmental Modification Goal Comment Patient will demonstrate and/or verbalize 3 environmentally-based ADL modifications to improve visual-based ADL/IADL activities.   Target Date 12/09/21   Goal 4 - Resource education   Goal Description: Resource education Patient will verbalize awareness of community resources for the following:;Audio access to print materials;Access to large print materials;Access to low vision devices   Target Date 12/09/21   Total Evaluation Time   OT Karolina, Moderate Complexity Minutes (54765) 31   Therapy Certification   Certification date from 09/16/21   Certification date to 12/09/21   Medical Diagnosis Primary open angle glaucoma of both eyes, severe stage H40.1133  - Primary    Certification I certify the need for these services furnished under this plan of treatment and while under my care.  (Physician co-signature of this document indicates review and certification of the therapy plan).

## 2021-09-17 LAB — SARS-COV-2 RNA RESP QL NAA+PROBE: NEGATIVE

## 2021-09-23 ENCOUNTER — TELEPHONE (OUTPATIENT)
Dept: INTERNAL MEDICINE | Facility: CLINIC | Age: 71
End: 2021-09-23

## 2021-09-23 ENCOUNTER — TELEPHONE (OUTPATIENT)
Dept: SLEEP MEDICINE | Facility: CLINIC | Age: 71
End: 2021-09-23

## 2021-09-23 NOTE — TELEPHONE ENCOUNTER
CALLED PT DUE TO THE PT BEING AT 33% WITH USAGE. LET THE PT KNOW SHE HAS UNTIL 10/13/2021 TO MEET COMPLIANCE BEFORE NEEDING TO RETURN THE DEVICE.

## 2021-09-23 NOTE — TELEPHONE ENCOUNTER
Received fax from [facility] for incontinence supply it was filled out and placed in PCP's folder.

## 2021-09-27 NOTE — LETTER
[facility]  [address] [address]  [phone]      9/30/2021       Sonya Foote  [address] [place] [address]        Dear Sonya,  You are due for a follow up appointment with Dr. Casey regarding your prescription for Methocarbamol 500 MG Tablet Please call to schedule, [phone].   Your prescriptions will not be refilled until after your follow up with Dr. Casey.       Sincerely,      Allan Casey MD/Vida Diaz, [facility]  Internal Medicine

## 2021-09-28 ENCOUNTER — TELEPHONE (OUTPATIENT)
Dept: NURSING | Facility: CLINIC | Age: 71
End: 2021-09-28

## 2021-09-28 ENCOUNTER — LAB REQUISITION (OUTPATIENT)
Dept: LAB | Facility: CLINIC | Age: 71
End: 2021-09-28
Payer: COMMERCIAL

## 2021-09-28 DIAGNOSIS — R35.0 FREQUENCY OF MICTURITION: ICD-10-CM

## 2021-09-28 LAB
ALBUMIN UR-MCNC: NEGATIVE MG/DL
AMORPH CRY #/AREA URNS HPF: ABNORMAL /HPF
APPEARANCE UR: CLEAR
BACTERIA #/AREA URNS HPF: ABNORMAL /HPF
BILIRUB UR QL STRIP: NEGATIVE
COLOR UR AUTO: ABNORMAL
GLUCOSE UR STRIP-MCNC: NEGATIVE MG/DL
HGB UR QL STRIP: ABNORMAL
KETONES UR STRIP-MCNC: NEGATIVE MG/DL
LEUKOCYTE ESTERASE UR QL STRIP: ABNORMAL
MUCOUS THREADS #/AREA URNS LPF: PRESENT /LPF
NITRATE UR QL: POSITIVE
PH UR STRIP: 7 [PH] (ref 5–7)
RBC URINE: 28 /HPF
SP GR UR STRIP: 1.01 (ref 1–1.03)
SQUAMOUS EPITHELIAL: <1 /HPF
UROBILINOGEN UR STRIP-MCNC: <2 MG/DL
WBC URINE: 14 /HPF

## 2021-09-28 PROCEDURE — 87086 URINE CULTURE/COLONY COUNT: CPT | Mod: ORL | Performed by: FAMILY MEDICINE

## 2021-09-28 PROCEDURE — 81001 URINALYSIS AUTO W/SCOPE: CPT | Mod: ORL | Performed by: FAMILY MEDICINE

## 2021-09-28 NOTE — TELEPHONE ENCOUNTER
[facility] nurse called to see if we could give verbal for SOC, infromed patient has established with a PCP at [facility] information given to homecare nurse    Vinicius Gonzáles RN

## 2021-09-28 NOTE — TELEPHONE ENCOUNTER
Pt last seen on 4/2021  Med is historical in chart  Routing for Dr. Casey to review request per protocol   Jackie Fisher, RN  [facility] RN Triage Team

## 2021-09-29 RX ORDER — METHOCARBAMOL 500 MG/1
TABLET, FILM COATED ORAL
Qty: 112 TABLET | Refills: 97 | OUTPATIENT
Start: 2021-09-29

## 2021-10-01 LAB — BACTERIA UR CULT: ABNORMAL

## 2021-10-04 DIAGNOSIS — Z53.9 DIAGNOSIS NOT YET DEFINED: Primary | ICD-10-CM

## 2021-10-04 PROCEDURE — 99207 PR MD CERTIFICATION HHA PATIENT: CPT | Performed by: INTERNAL MEDICINE

## 2021-10-04 NOTE — TELEPHONE ENCOUNTER
Routing refill request to provider for review/approval because:  RN unable to refuse medications         Shereen Jasmine RN  [facility]

## 2021-10-25 ENCOUNTER — MEDICAL CORRESPONDENCE (OUTPATIENT)
Dept: HEALTH INFORMATION MANAGEMENT | Facility: CLINIC | Age: 71
End: 2021-10-25
Payer: OTHER MISCELLANEOUS

## 2021-11-01 DIAGNOSIS — K59.00 CONSTIPATION, UNSPECIFIED CONSTIPATION TYPE: ICD-10-CM

## 2021-11-01 DIAGNOSIS — H40.1133 PRIMARY OPEN ANGLE GLAUCOMA OF BOTH EYES, SEVERE STAGE: ICD-10-CM

## 2021-11-01 RX ORDER — DORZOLAMIDE HCL 20 MG/ML
1 SOLUTION/ DROPS OPHTHALMIC 2 TIMES DAILY
Qty: 10 ML | Refills: 98 | OUTPATIENT
Start: 2021-11-01

## 2021-11-01 RX ORDER — DORZOLAMIDE HCL 20 MG/ML
1 SOLUTION/ DROPS OPHTHALMIC 2 TIMES DAILY
Qty: 10 ML | Refills: 98 | Status: SHIPPED | OUTPATIENT
Start: 2021-11-01

## 2021-11-01 RX ORDER — LACTULOSE 10 G/15ML
SOLUTION ORAL
Qty: 473 ML | Refills: 1 | Status: SHIPPED | OUTPATIENT
Start: 2021-11-01

## 2021-11-05 NOTE — PROGRESS NOTES
OCCUPATIONAL THERAPY VISUAL REHABILITATION   PROGRESS NOTE  Patient: Sharath Mercedes  : 1950  Insurance:   Payor/Plan Subscriber Name Rel Member # Group #   [employer] [facility]* SHARATH MERCEDES Self [id number]       [id number]       Beginning/End Dates of Reporting Period:  21 to  21    Therapy Diagnosis:   Therapy  Diagnosis: Impaired ADL/IADL with deficits in: Reading based ADL, Written communication, Dressing, Grooming, Eating (mobility)  Impairment comments: Decreased vision, neuropathy in hands, decreased overall strength, decreased mobility, pain    Client Self Report: N/A    GOALS     Goal 1 - Near vision        Near Vision Goal Comment: Patient will demonstrate 3 pieces of adaptive equipment and/or adaptive techniques in regards to magnification, lighting, contrast and glare for increased ADL/IADL independence with near vision tasks (reading, writing).   Target Date: 21   Goal update 21: Patient has met goal, however, additional areas need to be addressed.  Goal will be continued.    Goal 2 - Visual field                     Goal 3 - Environmental modification        Environmental Modification Goal Comment: Patient will demonstrate and/or verbalize 3 environmentally-based ADL modifications to improve visual-based ADL/IADL activities.   Target Date: 21  Goal update 21: Goal not yet met.  Patient has been educated in 1 strategy thus far for environmental modification.  Goal to be continued.        Goal 4 - Resource education    Goal Description: Resource education: Patient will verbalize awareness of community resources for the following:, Audio access to print materials, Access to large print materials, Access to low vision devices       Target Date: 21    Goal update 21: Goal not yet met, not addressed due to limited number of treatment sessions.    Goal 5 - Distance viewing                     Goal 6 - Cognition                     Goal 7                  Goal 8                   Progress Toward Goals  Goal(s) all not met due to no treatment beyond evaluation/1st visit.   Update 12/7/21: Goals continue to not be met due to only 2 visits total.        Adaptive Equipment/Optical Devices Recommended (as of 11/5/21)  CBS, contrast (for eating with greater accuracy and efficiency), cell ph: Reverse contrast, night shift; portable CCTV and likely for hand-held: 5 inch Reema (would do well with larger size): Liked yellow on black best and white on black second best; filters: Preferred dark plum for a darker tint, PBS -bold pen and paper   Update 12/7/21: ipad adaptations: Larger font, bold font, night shift, reverse contrast (did not like on iPad), zoom and zoom controller, memory as compensatory strategy    Plan  Patient to continue home program/techniques and continue skilled occupational therapy services per goals above.    UPDATE 12/7/21:  Patient called back and notified clinic that she would like to continue low vision rehab services after all.  Noted in Dr. Burnett's notes from 11/29/21 visit:  recommend ashish with low vision specialist     All goals above continue to be appropriate and skilled occupational therapy services continue to be indicated at a frequency of 1 time per week, decreasing frequency as indicated x12 more weeks.

## 2021-11-08 DIAGNOSIS — M19.041 PRIMARY OSTEOARTHRITIS OF BOTH HANDS: ICD-10-CM

## 2021-11-08 DIAGNOSIS — M19.042 PRIMARY OSTEOARTHRITIS OF BOTH HANDS: ICD-10-CM

## 2021-11-09 NOTE — TELEPHONE ENCOUNTER
Prescription approved per [facility] Refill Protocol.  Vinicius Gonzáles RN  [facility] Triage Nurse

## 2021-11-10 ENCOUNTER — TELEPHONE (OUTPATIENT)
Dept: PULMONOLOGY | Facility: CLINIC | Age: 71
End: 2021-11-10
Payer: OTHER MISCELLANEOUS

## 2021-11-10 NOTE — TELEPHONE ENCOUNTER
[facility] Call Center    Phone Message    May a detailed message be left on voicemail: yes     Reason for Call: Other: Laurie, nurse from [facility] called wanting to let Dr. Barroso know that Sonya has signed on with their hospice and will no longer be coming seeing a pulmonologist here. Any questions please reach out to Laurie at [phone]. Thank you.     Action Taken: Message routed to:  [facility] Lung Science    Travel Screening: Not Applicable

## 2021-11-15 ENCOUNTER — TELEPHONE (OUTPATIENT)
Dept: UROLOGY | Facility: CLINIC | Age: 71
End: 2021-11-15
Payer: OTHER MISCELLANEOUS

## 2021-11-16 ENCOUNTER — TELEPHONE (OUTPATIENT)
Dept: SLEEP MEDICINE | Facility: CLINIC | Age: 71
End: 2021-11-16
Payer: OTHER MISCELLANEOUS

## 2021-11-16 NOTE — TELEPHONE ENCOUNTER
CALLED PT TO TALK ABOUT COMPLIANCE WITH CPAP DEVICE. PT STATED THAT CURRENTLY SHE IS NOT USING THE DEVICE AND WILL TRY TO FIND A WAY TO RETURN IT TO [facility]

## 2021-11-19 ENCOUNTER — TELEPHONE (OUTPATIENT)
Dept: INTERNAL MEDICINE | Facility: CLINIC | Age: 71
End: 2021-11-19
Payer: OTHER MISCELLANEOUS

## 2021-11-19 NOTE — TELEPHONE ENCOUNTER
Laurie from [facility] called on behalf of pt.     Laurie stated that when pt signed on for care (10/24) pt failed to mention that she has/needs a pain pump. Pt has been complaining of pain and then it was discovered that pt needed this pump.     The medical director then prescribed morphine to use for this pump, and later the pt had started receiving fentanyl from the pump as well.     Laurie is unsure of which medications the pt needs to be using, wants to speak with the doctor and get this issue straightened out. She is very concerned for the pts health and care.     Laurie would also like to address the issue of the pump not being covered by hospice, and that it would need to be through pts regular insurance.     Please reach Laurie at [phone].

## 2021-11-19 NOTE — TELEPHONE ENCOUNTER
Called Laurie back and left detailed message informing patient has established with a new PCP through [facility] practive in may of 2021 and they would be the best ones to ask about her current pain protocol    Vinicius Gonzáles RN

## 2021-11-29 ENCOUNTER — OFFICE VISIT (OUTPATIENT)
Dept: OPHTHALMOLOGY | Facility: CLINIC | Age: 71
End: 2021-11-29
Attending: OPHTHALMOLOGY
Payer: MEDICARE

## 2021-11-29 DIAGNOSIS — H40.1133 PRIMARY OPEN ANGLE GLAUCOMA OF BOTH EYES, SEVERE STAGE: Primary | ICD-10-CM

## 2021-11-29 PROCEDURE — 92012 INTRM OPH EXAM EST PATIENT: CPT | Mod: GW | Performed by: OPHTHALMOLOGY

## 2021-11-29 PROCEDURE — G0463 HOSPITAL OUTPT CLINIC VISIT: HCPCS | Mod: 25

## 2021-11-29 PROCEDURE — 92133 CPTRZD OPH DX IMG PST SGM ON: CPT | Performed by: OPHTHALMOLOGY

## 2021-11-29 RX ORDER — BRIMONIDINE TARTRATE 2 MG/ML
1 SOLUTION/ DROPS OPHTHALMIC 3 TIMES DAILY
COMMUNITY

## 2021-11-29 RX ORDER — DORZOLAMIDE HYDROCHLORIDE AND TIMOLOL MALEATE 20; 5 MG/ML; MG/ML
1 SOLUTION/ DROPS OPHTHALMIC 2 TIMES DAILY
COMMUNITY

## 2021-11-29 ASSESSMENT — CONF VISUAL FIELD
OS_SUPERIOR_TEMPORAL_RESTRICTION: 1
OD_INFERIOR_NASAL_RESTRICTION: 1
OD_SUPERIOR_NASAL_RESTRICTION: 1
OS_INFERIOR_NASAL_RESTRICTION: 1
OS_INFERIOR_TEMPORAL_RESTRICTION: 1
OS_SUPERIOR_NASAL_RESTRICTION: 1
OD_INFERIOR_TEMPORAL_RESTRICTION: 3
OD_SUPERIOR_TEMPORAL_RESTRICTION: 3

## 2021-11-29 ASSESSMENT — REFRACTION_WEARINGRX
OD_ADD: +1.75
OD_CYLINDER: SPHERE
SPECS_TYPE: BIFOCAL
OD_SPHERE: -1.75

## 2021-11-29 ASSESSMENT — TONOMETRY
IOP_METHOD: TONOPEN
OD_IOP_MMHG: 14
OS_IOP_MMHG: 16

## 2021-11-29 ASSESSMENT — VISUAL ACUITY
OD_CC: 20/50
OS_SC: NLP
METHOD: SNELLEN - LINEAR
OD_CC+: -2

## 2021-11-29 ASSESSMENT — SLIT LAMP EXAM - LIDS
COMMENTS: NORMAL
COMMENTS: NORMAL

## 2021-11-29 ASSESSMENT — EXTERNAL EXAM - RIGHT EYE: OD_EXAM: NORMAL

## 2021-11-29 ASSESSMENT — CUP TO DISC RATIO
OD_RATIO: 0.8
OS_RATIO: 0.9

## 2021-11-29 ASSESSMENT — EXTERNAL EXAM - LEFT EYE: OS_EXAM: NORMAL

## 2021-11-29 NOTE — PROGRESS NOTES
CC: follow up glaucoma   HPI: Sonya Foote is a  71 year old year-old patient with history of Primary open angle glaucoma (POAG), here for follow up. Reports no changes in vision, no flashes and floaters     Retinal Imaging:  OCT ONFL  11/29/21   RE: stable temporal thinning  LE: diffuse thinning    Macula Optical Coherence Tomography: 05/10/21 good foveal contour    Assessment & Plan:    # POAG/?LTG vs Glc Suspect   -- s/p SLT OD (3/13/15),  -- H/O negative head CT and MRI 2013,2014 for headaches, .    See below plan   -- K pachy: 539/540     Tmax: OD:23      HVF: OD:[place] on L[place] in 2016 (unable to transfer out of chair)       CDR: 0.7/0.9 (pallor OS>OD)      HRT/OCT: Severe RNFL thinning OS>OD         FHX of Glc: Father, grandparents -- lost vision, was on gtts       Asthma/COPD: Yes, on inhalers but tolerating topical and po BB   Steroid Use: No    Kidney Stones: No    Sulfa Allergy: No   - Good intraocular pressure 12/10  Overall exam continues to showed stability; will continue to monitor ONFL and intraocular pressure     # PCIOL OD   -- vision loss in the right eye predates 2013 (pt believes she lost all vision in 2005) and she had several MRI's in 2013 including one with orbital sequences which did not reveal any structural change to cause a right optic neuropathy.     # DM without  Diabetic retinopathy  Blood pressure (<120/80) and blood glucose (HbA1c <7.0) control discussed with patient. Patient advised that failure to adequately control each may lead to vision loss. The patient expressed understanding.    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    #Dry eye syndrome   Recommend artificial tears  As  needed     Stable eye exam    PLAN  Patient will continue on   - Cosopt (Timolol/Dorzolamide) which is a blue top drop 2x/day (12 hours apart) in the RIGHT EYE ONLY,  - Latanoprost which is a teal top drop at bedtime in both eyes, and   - Alphagan (Brimonidine) which is a purple top drop 2x/day (12 hours apart) in the RIGHT EYE ONLY.   - Patient will return to clinic in 6 months with evaluation, dilated eye exam, OCT with RNFL analysis and IOP check.  - recommend ashish with low vision specialist    - follow up in 6 months; intraocular pressure check and nerve fiber layer right eye  sooner as needed;        ~~~~~~~~~~~~~~~~~~~~~~~~~~~~~~~~~~   Complete documentation of historical and exam elements from today's encounter can be found in the full encounter summary report (not reduplicated in this progress note).  I personally obtained the chief complaint(s) and history of present illness.  I confirmed and edited as necessary the review of systems, past medical/surgical history, family history, social history, and examination findings as documented by others; and I examined the patient myself.  I personally reviewed the relevant tests, images, and reports as documented above.  I formulated and edited as necessary the assessment and plan and discussed the findings and management plan with the patient and family    Mel Burnett MD   of Ophthalmology.  Retina Service   Department of Ophthalmology and Visual Neurosciences   [facility]  Phone: [phone]   Fax: [phone]

## 2021-11-29 NOTE — NURSING NOTE
Chief Complaints and History of Present Illnesses   Patient presents with     Follow Up     Primary open angle glaucoma of both eyes     Chief Complaint(s) and History of Present Illness(es)     Follow Up     Laterality: both eyes    Course: gradually worsening    Associated symptoms: eye pain (right eye intermittent), dryness (mild) and headache.  Negative for flashes    Treatments tried: eye drops    Pain scale: 0/10 (None currently)    Comments: Primary open angle glaucoma of both eyes              Comments     She states that her vision seems to be worsening since her last exam.    She is using:  - Cosopt (Timolol/Dorzolamide)  2x/day (12 hours apart) right eye   - Latanoprost at bedtime in both eyes   - Alphagan (Brimonidine)  (12 hours apart) right eye                  10/21/21 09/08/21 06/14/21  Hemoglobin 11.9 Low  11.3 Low  13.2    KAILASH Pepper 10:28 AM  November 29, 2021

## 2021-12-01 ENCOUNTER — LAB REQUISITION (OUTPATIENT)
Dept: LAB | Facility: CLINIC | Age: 71
End: 2021-12-01
Payer: MEDICARE

## 2021-12-01 DIAGNOSIS — Z11.59 ENCOUNTER FOR SCREENING FOR OTHER VIRAL DISEASES: ICD-10-CM

## 2021-12-02 PROCEDURE — U0005 INFEC AGEN DETEC AMPLI PROBE: HCPCS | Mod: ORL | Performed by: FAMILY MEDICINE

## 2021-12-03 LAB — SARS-COV-2 RNA RESP QL NAA+PROBE: NEGATIVE

## 2021-12-07 ENCOUNTER — HOSPITAL ENCOUNTER (OUTPATIENT)
Dept: OCCUPATIONAL THERAPY | Facility: CLINIC | Age: 71
Setting detail: THERAPIES SERIES
End: 2021-12-07
Attending: OPHTHALMOLOGY
Payer: MEDICARE

## 2021-12-07 PROCEDURE — 97535 SELF CARE MNGMENT TRAINING: CPT | Mod: GO | Performed by: OCCUPATIONAL THERAPIST

## 2021-12-07 NOTE — PROGRESS NOTES
[facility]    OUTPATIENT OCCUPATIONAL THERAPY  PLAN OF TREATMENT FOR OUTPATIENT REHABILITATION AND PROGRESS NOTE    Patient's Last Name, First Name, Sonya Hurtado Date of Birth  1950   Provider's Name  [facility] Medical Record No.  [id number]    Onset Date  6/9/21 - date of order Start of Care Date  09/16/21   Type:     __PT   _X_OT   __SLP Medical Diagnosis  Primary open angle glaucoma of both eyes, severe stage H40.1133  - Primary   OT Diagnosis  Impaired ADL/IADL with deficits in Reading based ADL, Written communication, Dressing, Grooming, Eating (mobility)  Decreased vision, neuropathy in hands, decreased overall strength, decreased mobility, pain Plan of Treatment  Frequency/Duration: 1x/week, decreasing frequency prn  Certification date from 12/7/21 to 3/1/22     Goals:  Please see progress note (initiated 11/5/21 and updated 12/7/21) for details of intervention completed and progress.         I CERTIFY THE NEED FOR THESE SERVICES FURNISHED UNDER        THIS PLAN OF TREATMENT AND WHILE UNDER MY CARE     (Physician co-signature of this document indicates review and certification of the therapy plan).                Referring Provider: Mel Burnett MD Amy Glaser, OT

## 2022-01-01 ENCOUNTER — TRANSFERRED RECORDS (OUTPATIENT)
Dept: HEALTH INFORMATION MANAGEMENT | Facility: CLINIC | Age: 72
End: 2022-01-01

## 2022-01-01 ENCOUNTER — LAB REQUISITION (OUTPATIENT)
Dept: LAB | Facility: CLINIC | Age: 72
End: 2022-01-01
Payer: COMMERCIAL

## 2022-01-01 ENCOUNTER — HOSPITAL ENCOUNTER (OUTPATIENT)
Dept: OCCUPATIONAL THERAPY | Facility: CLINIC | Age: 72
Setting detail: THERAPIES SERIES
Discharge: HOME | End: 2022-04-29
Attending: OPHTHALMOLOGY
Payer: MEDICARE

## 2022-01-01 ENCOUNTER — OFFICE VISIT (OUTPATIENT)
Dept: OPTOMETRY | Facility: CLINIC | Age: 72
End: 2022-01-01
Attending: OPHTHALMOLOGY
Payer: COMMERCIAL

## 2022-01-01 ENCOUNTER — OFFICE VISIT (OUTPATIENT)
Dept: OPHTHALMOLOGY | Facility: CLINIC | Age: 72
End: 2022-01-01
Attending: OPHTHALMOLOGY
Payer: MEDICARE

## 2022-01-01 ENCOUNTER — HOSPITAL ENCOUNTER (OUTPATIENT)
Dept: OCCUPATIONAL THERAPY | Facility: CLINIC | Age: 72
Setting detail: THERAPIES SERIES
Discharge: HOME | End: 2022-10-04
Attending: FAMILY MEDICINE
Payer: MEDICARE

## 2022-01-01 ENCOUNTER — MEDICAL CORRESPONDENCE (OUTPATIENT)
Dept: HEALTH INFORMATION MANAGEMENT | Facility: CLINIC | Age: 72
End: 2022-01-01

## 2022-01-01 ENCOUNTER — LAB REQUISITION (OUTPATIENT)
Dept: LAB | Facility: CLINIC | Age: 72
End: 2022-01-01
Payer: MEDICARE

## 2022-01-01 ENCOUNTER — TRANSFERRED RECORDS (OUTPATIENT)
Dept: INTERNAL MEDICINE | Facility: CLINIC | Age: 72
End: 2022-01-01

## 2022-01-01 ENCOUNTER — HOSPITAL ENCOUNTER (OUTPATIENT)
Dept: OCCUPATIONAL THERAPY | Facility: CLINIC | Age: 72
Setting detail: THERAPIES SERIES
Discharge: HOME | End: 2022-09-23
Attending: OPHTHALMOLOGY
Payer: MEDICARE

## 2022-01-01 DIAGNOSIS — H43.393 VITREOUS SYNERESIS OF BOTH EYES: ICD-10-CM

## 2022-01-01 DIAGNOSIS — H54.7 LOW VISION, UNSPECIFIED LEFT EYE VISUAL IMPAIRMENT CATEGORY, UNSPECIFIED RIGHT EYE VISUAL IMPAIRMENT CATEGORY: Primary | ICD-10-CM

## 2022-01-01 DIAGNOSIS — L89.132: ICD-10-CM

## 2022-01-01 DIAGNOSIS — H40.1133 PRIMARY OPEN ANGLE GLAUCOMA OF BOTH EYES, SEVERE STAGE: Primary | ICD-10-CM

## 2022-01-01 DIAGNOSIS — H52.4 PRESBYOPIA: ICD-10-CM

## 2022-01-01 DIAGNOSIS — J44.9 CHRONIC OBSTRUCTIVE PULMONARY DISEASE, UNSPECIFIED COPD TYPE (H): ICD-10-CM

## 2022-01-01 DIAGNOSIS — H40.1133 PRIMARY OPEN ANGLE GLAUCOMA OF BOTH EYES, SEVERE STAGE: ICD-10-CM

## 2022-01-01 DIAGNOSIS — H54.3 BLINDNESS OF LEFT EYE WITH CATEGORY 2 LOW VISION OF RIGHT EYE: ICD-10-CM

## 2022-01-01 DIAGNOSIS — Z86.69 HISTORY OF CENTRAL RETINAL ARTERY OCCLUSION: Primary | Chronic | ICD-10-CM

## 2022-01-01 DIAGNOSIS — H40.1113 PRIMARY OPEN-ANGLE GLAUCOMA, RIGHT EYE, SEVERE STAGE: Primary | ICD-10-CM

## 2022-01-01 DIAGNOSIS — Z96.1 PSEUDOPHAKIA, RIGHT EYE: ICD-10-CM

## 2022-01-01 DIAGNOSIS — Z11.59 ENCOUNTER FOR SCREENING FOR OTHER VIRAL DISEASES: ICD-10-CM

## 2022-01-01 LAB
BACTERIA UR CULT: NORMAL
SARS-COV-2 RNA RESP QL NAA+PROBE: NEGATIVE

## 2022-01-01 PROCEDURE — G0463 HOSPITAL OUTPT CLINIC VISIT: HCPCS | Mod: 25

## 2022-01-01 PROCEDURE — 97535 SELF CARE MNGMENT TRAINING: CPT | Mod: GO | Performed by: OCCUPATIONAL THERAPIST

## 2022-01-01 PROCEDURE — 999N000103 HC STATISTIC NO CHARGE FACILITY FEE

## 2022-01-01 PROCEDURE — 92012 INTRM OPH EXAM EST PATIENT: CPT | Mod: GC | Performed by: OPHTHALMOLOGY

## 2022-01-01 PROCEDURE — 99207 OCT OPTIC NERVE RNFL SPECTRALIS OU (BOTH EYES): CPT | Mod: 26 | Performed by: OPHTHALMOLOGY

## 2022-01-01 PROCEDURE — 92134 CPTRZ OPH DX IMG PST SGM RTA: CPT | Performed by: OPHTHALMOLOGY

## 2022-01-01 PROCEDURE — 87086 URINE CULTURE/COLONY COUNT: CPT | Mod: ORL | Performed by: FAMILY MEDICINE

## 2022-01-01 PROCEDURE — 99214 OFFICE O/P EST MOD 30 MIN: CPT | Performed by: OPHTHALMOLOGY

## 2022-01-01 PROCEDURE — 92083 EXTENDED VISUAL FIELD XM: CPT | Performed by: OPHTHALMOLOGY

## 2022-01-01 PROCEDURE — 92133 CPTRZD OPH DX IMG PST SGM ON: CPT | Performed by: OPHTHALMOLOGY

## 2022-01-01 PROCEDURE — U0003 INFECTIOUS AGENT DETECTION BY NUCLEIC ACID (DNA OR RNA); SEVERE ACUTE RESPIRATORY SYNDROME CORONAVIRUS 2 (SARS-COV-2) (CORONAVIRUS DISEASE [COVID-19]), AMPLIFIED PROBE TECHNIQUE, MAKING USE OF HIGH THROUGHPUT TECHNOLOGIES AS DESCRIBED BY CMS-2020-01-R: HCPCS | Mod: ORL | Performed by: FAMILY MEDICINE

## 2022-01-01 RX ORDER — ALBUTEROL SULFATE 0.83 MG/ML
SOLUTION RESPIRATORY (INHALATION)
Qty: 180 ML | Refills: 97 | OUTPATIENT
Start: 2022-01-01

## 2022-01-01 RX ORDER — BRIMONIDINE TARTRATE 2 MG/ML
1 SOLUTION/ DROPS OPHTHALMIC 2 TIMES DAILY
Qty: 5 ML | Refills: 1 | Status: SHIPPED | OUTPATIENT
Start: 2022-01-01

## 2022-01-01 RX ORDER — DOCUSATE SODIUM 50MG AND SENNOSIDES 8.6MG 8.6; 5 MG/1; MG/1
TABLET, FILM COATED ORAL
Qty: 56 TABLET | Refills: 97 | OUTPATIENT
Start: 2022-01-01

## 2022-01-01 ASSESSMENT — CONF VISUAL FIELD
OD_SUPERIOR_NASAL_RESTRICTION: 1
OS_INFERIOR_NASAL_RESTRICTION: 1
OD_SUPERIOR_NASAL_RESTRICTION: 1
OD_INFERIOR_TEMPORAL_RESTRICTION: 3
OS_SUPERIOR_TEMPORAL_RESTRICTION: 1
OD_SUPERIOR_TEMPORAL_RESTRICTION: 1
OS_SUPERIOR_NASAL_RESTRICTION: 1
OD_SUPERIOR_NASAL_RESTRICTION: 1
OS_INFERIOR_TEMPORAL_RESTRICTION: 1
OS_SUPERIOR_TEMPORAL_RESTRICTION: 1
OS_SUPERIOR_TEMPORAL_RESTRICTION: 1
OD_INFERIOR_NASAL_RESTRICTION: 1
OD_INFERIOR_TEMPORAL_RESTRICTION: 3
OS_INFERIOR_TEMPORAL_RESTRICTION: 1
OS_INFERIOR_TEMPORAL_RESTRICTION: 1
OS_INFERIOR_NASAL_RESTRICTION: 1
OS_SUPERIOR_NASAL_RESTRICTION: 1
OS_SUPERIOR_NASAL_RESTRICTION: 1
OD_INFERIOR_NASAL_RESTRICTION: 1
OS_INFERIOR_NASAL_RESTRICTION: 1
OD_SUPERIOR_TEMPORAL_RESTRICTION: 3
OD_SUPERIOR_TEMPORAL_RESTRICTION: 1
OD_INFERIOR_TEMPORAL_RESTRICTION: 3
OD_INFERIOR_NASAL_RESTRICTION: 1

## 2022-01-01 ASSESSMENT — REFRACTION_WEARINGRX
OD_ADD: +1.75
OD_SPHERE: -1.75
SPECS_TYPE: BIFOCAL
OD_CYLINDER: SPHERE

## 2022-01-01 ASSESSMENT — CUP TO DISC RATIO
OS_RATIO: NTC
OD_RATIO: 0.8
OD_RATIO: 0.8
OS_RATIO: 0.9

## 2022-01-01 ASSESSMENT — TONOMETRY
IOP_METHOD: TONOPEN
OS_IOP_MMHG: 11
IOP_METHOD: ICARE
OD_IOP_MMHG: 13
OD_IOP_MMHG: 13
IOP_METHOD: ICARE
OS_IOP_MMHG: 14
OS_IOP_MMHG: 11
OD_IOP_MMHG: 15

## 2022-01-01 ASSESSMENT — EXTERNAL EXAM - LEFT EYE
OS_EXAM: NORMAL
OS_EXAM: NORMAL

## 2022-01-01 ASSESSMENT — VISUAL ACUITY
CORRECTION_TYPE: GLASSES
OS_CC: NLP
METHOD: SNELLEN - LINEAR
METHOD: SNELLEN - LINEAR
CORRECTION_TYPE: GLASSES
OS_CC: NLP
OD_CC: 20/70
METHOD: SNELLEN - LINEAR
OD_CC: 20/100
OD_CC: 20/100
OS_CC: NLP
CORRECTION_TYPE: GLASSES

## 2022-01-01 ASSESSMENT — EXTERNAL EXAM - RIGHT EYE
OD_EXAM: NORMAL
OD_EXAM: NORMAL

## 2022-01-01 ASSESSMENT — SLIT LAMP EXAM - LIDS
COMMENTS: NORMAL

## 2022-01-01 ASSESSMENT — REFRACTION_MANIFEST
OD_ADD: +3.00
OD_CYLINDER: SPHERE
OS_SPHERE: BALANCE
OD_SPHERE: -0.75

## 2022-01-19 ENCOUNTER — LAB REQUISITION (OUTPATIENT)
Dept: LAB | Facility: CLINIC | Age: 72
End: 2022-01-19
Payer: COMMERCIAL

## 2022-01-19 DIAGNOSIS — N39.0 URINARY TRACT INFECTION, SITE NOT SPECIFIED: ICD-10-CM

## 2022-01-19 LAB
ALBUMIN UR-MCNC: NEGATIVE MG/DL
APPEARANCE UR: CLEAR
BACTERIA #/AREA URNS HPF: ABNORMAL /HPF
BILIRUB UR QL STRIP: NEGATIVE
COLOR UR AUTO: COLORLESS
GLUCOSE UR STRIP-MCNC: NEGATIVE MG/DL
HGB UR QL STRIP: ABNORMAL
KETONES UR STRIP-MCNC: NEGATIVE MG/DL
LEUKOCYTE ESTERASE UR QL STRIP: ABNORMAL
MUCOUS THREADS #/AREA URNS LPF: PRESENT /LPF
NITRATE UR QL: NEGATIVE
PH UR STRIP: 5.5 [PH] (ref 5–7)
RBC URINE: 0 /HPF
SP GR UR STRIP: 1 (ref 1–1.03)
UROBILINOGEN UR STRIP-MCNC: <2 MG/DL
WBC CLUMPS #/AREA URNS HPF: PRESENT /HPF
WBC URINE: 39 /HPF

## 2022-01-19 PROCEDURE — 81001 URINALYSIS AUTO W/SCOPE: CPT | Mod: ORL | Performed by: INTERNAL MEDICINE

## 2022-01-20 ENCOUNTER — HOSPITAL ENCOUNTER (OUTPATIENT)
Dept: OCCUPATIONAL THERAPY | Facility: CLINIC | Age: 72
Setting detail: THERAPIES SERIES
End: 2022-01-20
Attending: OPHTHALMOLOGY
Payer: MEDICARE

## 2022-01-20 PROCEDURE — 97535 SELF CARE MNGMENT TRAINING: CPT | Mod: GO | Performed by: OCCUPATIONAL THERAPIST
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
## 2022-05-16 NOTE — NURSING NOTE
Chief Complaints and History of Present Illnesses   Patient presents with     Primary Open Angle Glaucoma Follow Up     6 month follow up      Chief Complaint(s) and History of Present Illness(es)     Primary Open Angle Glaucoma Follow Up     Comments: 6 month follow up               Comments     Pt states vision appears more blurry in RE when looking in the distance. Pt notes that she could not find her glasses yesterday so she was having headaches and strained eye pain. Pt has her glasses with her today. No flashes of light. Occasional floaters in RE, No changes.  DM2 BS: 120 this morning per pt.  Lab Results       Component                Value               Date                       A1C                      5.1                 12/26/2020                 A1C                      5.0                 08/19/2020                 A1C                      5.0                 12/16/2019                 A1C                      5.0                 04/17/2019                 A1C                      5.1                 06/06/2018              GISELE Wells May 16, 2022 9:48 AM

## 2022-05-16 NOTE — PROGRESS NOTES
CC: follow up glaucoma   HPI: Sonya Foote is a  71 year old year-old patient with history of Primary open angle glaucoma (POAG), here for follow up. Reports no changes in vision, no flashes and floaters     Retinal Imaging:  OCT ONFL  05/16/22   RE: stable temporal thinning  LE: diffuse thinning    Macula Optical Coherence Tomography: 05/10/21 good foveal contour    Assessment & Plan:    # POAG/?LTG vs Glc Suspect   -- s/p SLT OD (3/13/15),  -- H/O negative head CT and MRI 2013,2014 for headaches.  -- K pachy: 539/540     Tmax: OD:23      HVF: OD:[place] on LVC in 2016 (unable to transfer out of chair)       CDR: 0.7/0.9 (pallor OS>OD)      HRT/OCT: Severe RNFL thinning OS>OD         FHX of Glc: Father, grandparents -- lost vision, was on gtts       Asthma/COPD: Yes, on inhalers but tolerating topical and po BB   Steroid Use: No    Kidney Stones: No    Sulfa Allergy: No    Overall exam continues to showed stability; will continue to monitor ONFL and intraocular pressure     # PCIOL OD   -- vision loss in the right eye predates 2013 (pt believes she lost all vision in 2005) and she had several MRI's in 2013 including one with orbital sequences which did not reveal any structural change to cause a right optic neuropathy.     # DM without  Diabetic retinopathy  Blood pressure (<120/80) and blood glucose (HbA1c <7.0) control discussed with patient. Patient advised that failure to adequately control each may lead to vision loss. The patient expressed understanding.    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  #Dry eye syndrome   Recommend artificial tears  As needed   warm compresses     Stable eye  exam    PLAN  Patient will continue on   - Cosopt (Timolol/Dorzolamide) which is a blue top drop 2x/day (12 hours apart) in the RIGHT EYE ONLY,  - Latanoprost which is a teal top drop at bedtime in both eyes, and   - Alphagan (Brimonidine) which is a purple top drop 2x/day (12 hours apart) in the RIGHT EYE ONLY.     - Patient will return to clinic in 6 months: dilated eye exam, macula Optical Coherence Tomography, ON RNFL analysis and IOP check. Attempt LVF (if able to move)  - recommend ashish with low vision refraction  - follow up in 6 months with glaucoma and veena       ~~~~~~~~~~~~~~~~~~~~~~~~~~~~~~~~~~   Complete documentation of historical and exam elements from today's encounter can be found in the full encounter summary report (not reduplicated in this progress note).  I personally obtained the chief complaint(s) and history of present illness.  I confirmed and edited as necessary the review of systems, past medical/surgical history, family history, social history, and examination findings as documented by others; and I examined the patient myself.  I personally reviewed the relevant tests, images, and reports as documented above.  I formulated and edited as necessary the assessment and plan and discussed the findings and management plan with the patient and family    Mel Burnett MD   of Ophthalmology.  Retina Service   Department of Ophthalmology and Visual Neurosciences   [facility]  Phone: [phone]   Fax: [phone]

## 2022-06-06 NOTE — MR AVS SNAPSHOT
After Visit Summary   4/3/2017    Sonya Foote    MRN: [id number]           Patient Information     Date Of Birth          1950        Visit Information        Provider Department      4/3/2017 11:15 AM Ky Ramírez MD [facility] Medicine        Today's Diagnoses     Primary osteoarthritis of both hands    -  1       Follow-ups after your visit        Your next 10 appointments already scheduled     Apr 04, 2017  2:45 PM CDT   Return Visit with Bj Anderson MD   [facility] [place] ([facility])    [address]  [facility] [address]   [phone]            Apr 26, 2017  8:40 AM CDT   (Arrive by 8:25 AM)   NEW HEART FAILURE with Radha Aldrich MD   [facility] ([facility])    [address] [place] [address]  [address] [facility] [address] [phone]            May 08, 2017  1:00 PM CDT   Nurse Visit with UA NURSE   [facility] ([facility])    [address]  [address]  [facility] [address]   [phone]            May 12, 2017 10:50 AM CDT   (Arrive by 10:35 AM)   RETURN DIABETES with Michelle Irizarry MD   [facility] ([facility])    [address] [place] [address]  [address] [facility] [address] [phone]            May 18, 2017  1:00 PM CDT   (Arrive by 12:45 PM)   Cystoscopy with Elizabeth Oliver MD   [facility] ([facility])    [address] [place] [address]  [address] [facility] [address] [phone]            Jul 10, 2017 11:00 AM CDT   (Arrive by 10:45 AM)   Return Visit with Alina Jennings MD   [facility] ([facility])    [address] [place] [address]  [address] [facility] [id number] [phone]              Who to contact      Please call your clinic at [phone] to:    Ask questions about your health    Make or cancel appointments    Discuss your medicines    Learn about your test results    Speak to your doctor   If you have compliments or concerns about an experience at your clinic, or if you wish to file a complaint, please contact [facility] Patient Relations at [phone] or email us at [email or web address]         Additional Information About Your Visit        [employer] Information     Relatient is an electronic gateway that provides easy, online access to your medical records. With Relatient, you can request a clinic appointment, read your test results, renew a prescription or communicate with your care team.     To sign up for Relatient visit the website at [email or web address]   You will be asked to enter the access code listed below, as well as some personal information. Please follow the directions to create your username and password.     Your access code is: [email or web address]  Expires: 2017 11:52 AM     Your access code will  in 90 days. If you need help or a new code, please contact your [facility] or call [phone] for assistance.        Care EveryWhere ID     This is your Care EveryWhere ID. This could be used by other organizations to access your [facility] medical records  [id number]        Your Vitals Were     BMI (Body Mass Index)                   20.83 kg/m2            Blood Pressure from Last 3 Encounters:   17 138/70   17 138/70   03/15/17 149/81    Weight from Last 3 Encounters:   17 133 lb (60.3 kg)   17 133 lb (60.3 kg)   03/15/17 135 lb (61.2 kg)              Today, you had the following     No orders found for display         Today's Medication Changes          These changes are accurate as of: 4/3/17  5:06 PM.  If you have any questions, ask your nurse or doctor.               These medicines have  changed or have updated prescriptions.        Dose/Directions    atorvastatin 40 MG tablet   Commonly known as:  LIPITOR   This may have changed:  when to take this   Used for:  Hyperlipidemia LDL goal <100        Dose:  40 mg   Take 1 tablet (40 mg) by mouth daily   Quantity:  90 tablet   Refills:  3       clopidogrel 75 MG tablet   Commonly known as:  PLAVIX   This may have changed:  when to take this   Used for:  PAD (peripheral artery disease) (H), UGI bleed, Osteoporosis, Nausea        Dose:  75 mg   Take 1 tablet (75 mg) by mouth daily   Quantity:  90 tablet   Refills:  3       ondansetron 4 MG ODT tab   Commonly known as:  ZOFRAN-ODT   This may have changed:    - when to take this  - reasons to take this   Used for:  Intractable vomiting with nausea, unspecified vomiting type        Dose:  4 mg   Take 1 tablet (4 mg) by mouth every 6 hours   Quantity:  120 tablet   Refills:  0       Phenytoin Sodium Extended 200 MG Caps   This may have changed:  additional instructions   Used for:  Seizure disorder (H)        Dose:  200 mg   Take 200 mg by mouth 2 times daily   Quantity:  60 capsule   Refills:  0                Primary Care Provider Office Phone # Fax #    Allan Casey [phone]       [facility] [address] [place] [phone]        Thank you!     Thank you for choosing [facility]  for your care. Our goal is always to provide you with excellent care. Hearing back from our patients is one way we can continue to improve our services. Please take a few minutes to complete the written survey that you may receive in the mail after your visit with us. Thank you!             Your Updated Medication List - Protect others around you: Learn how to safely use, store and throw away your medicines at www.disposemymeds.org.          This list is accurate as of: 4/3/17  5:06 PM.  Always use your most recent med list.                   Brand Name Dispense Instructions  for use    ADVAIR DISKUS 250-50 MCG/DOSE diskus inhaler   Generic drug:  fluticasone-salmeterol      Inhale 1 puff into the lungs 2 times daily       albuterol (2.5 MG/3ML) 0.083% neb solution     360 mL    INHALE 1 VIAL VIA NEBULIZER EVERY 6 HOURS AS NEEDED       aspirin 81 MG EC tablet     90 tablet    Take 1 tablet (81 mg) by mouth daily       atorvastatin 40 MG tablet    LIPITOR    90 tablet    Take 1 tablet (40 mg) by mouth daily       blood glucose monitoring meter device kit     1 kit    Use to test blood sugars 3 times daily or as directed.       blood glucose monitoring test strip    ONE TOUCH ULTRA    3 Box    Use to test blood sugars 3 times daily or as directed.       calcium citrate-vitamin D 315-250 MG-UNIT Tabs per tablet    CITRACAL    120 tablet    Take 2 tablets by mouth daily       cetirizine 10 MG tablet    zyrTEC    90 tablet    Take 1 tablet (10 mg) by mouth every evening       clopidogrel 75 MG tablet    PLAVIX    90 tablet    Take 1 tablet (75 mg) by mouth daily       CYANOCOBALAMIN PO      Take 2,000 mcg by mouth daily       diazepam 5 MG tablet    VALIUM    20 tablet    Take 1 tablet (5 mg) by mouth every 6 hours as needed for anxiety       DOCUSATE SODIUM PO      Take 100 mg by mouth daily       dorzolamide 2 % ophthalmic solution    TRUSOPT     Place 1 drop into both eyes 2 times daily       * EASY TOUCH LANCETS 30G/TWIST Misc          * thin lancets    NO BRAND SPECIFIED    1 Box    Use to test blood sugar 3 times daily or as directed.       * blood glucose monitoring lancets     3 Box    Use to test blood sugar 3 times daily or as directed.       escitalopram 20 MG tablet    LEXAPRO    90 tablet    One per day       estradiol 1 MG tablet    ESTRACE    90 tablet    Take 1 tablet (1 mg) by mouth daily       ferrous sulfate 325 (65 FE) MG tablet    IRON    60 tablet    Take 1 tablet (325 mg) by mouth 2 times daily With meals       FLOMAX 0.4 MG capsule   Generic drug:  tamsulosin      Take  0.4 mg by mouth At Bedtime       fluticasone 50 MCG/ACT spray    FLONASE     Spray 1 spray into both nostrils daily       gabapentin 300 MG capsule    NEURONTIN    90 capsule    Take 1 capsule (300 mg) by mouth 3 times daily       hydrochlorothiazide 12.5 MG capsule    MICROZIDE    90 capsule    Take 1 capsule (12.5 mg) by mouth daily       HYDROmorphone 2 MG tablet    DILAUDID    30 tablet    Take 1 tablet (2 mg) by mouth every 3 hours as needed for moderate to severe pain       RENÉE Pack      Take 1 packet by mouth 2 times daily       lactulose 10 GM/15ML solution    CHRONULAC    946 mL    Take 30 mLs (20 g) by mouth daily as needed for constipation       latanoprost 0.005 % ophthalmic solution    XALATAN    2.5 mL    Place 1 drop into both eyes At Bedtime       levETIRAcetam 500 MG tablet    KEPPRA    180 tablet    Take 1 tablet (500 mg) by mouth 2 times daily       lidocaine 5 % Patch    LIDODERM    30 patch    Apply from 1 to 3 patches to painful area at once for up to 12 h within a 24 h period.  Remove after 12 hours.       lisinopril 2.5 MG tablet    PRINIVIL/Zestril    90 tablet    Take 1 tablet (2.5 mg) by mouth daily       LYRICA 100 MG capsule   Generic drug:  pregabalin      Take 100 mg by mouth 2 times daily       MEDICATION GIVEN BY INTRATHECAL PUMP - INSTRUCTION      continuous 4/11/2016 per [facility] [place] [phone]: Conc: Bupivacaine 20 mg/mL and Fentanyl 2000 mcg/mL. Continuous: Bupivacaine 5.052 mg/day and Fentanyl 505.2 mcg/day. Boluses: Up to 7 boluses per 24-hr period of Bupivicaine 0.5992 mg and Fentanyl 59.9 mcg Max daily dose: Bupivacaine 9.1973 mg/day and Fentanyl 919.5883 mcg/day  Pump Last Fill Date:  6/30/2016 Pump Refill Date: 9/20/2016       metoprolol 25 MG 24 hr tablet    TOPROL-XL    30 tablet    Take 1 tablet (25 mg) by mouth daily       MULTIVITAMIN PO      Take 1 tablet by mouth daily       nitroglycerin 0.4 MG sublingual tablet    NITROSTAT     25 tablet    Place 1 tablet (0.4 mg) under the tongue every 5 minutes as needed for chest pain if you are still having symptoms after 3 doses (15 minutes) call 911.       ondansetron 4 MG ODT tab    ZOFRAN-ODT    120 tablet    Take 1 tablet (4 mg) by mouth every 6 hours       * order for DME     1 Month    Equipment being ordered: Depends briefs       * order for DME     1 Device    Equipment being ordered: Power Wheelchair       * order for DME     1 Units    Equipment being ordered: Nebulizer and supplies       * order for DME     1 Box    Equipment being ordered: Glucerna daily shakes with each meal       * order for DME     1 Device    ACcu check BID       * order for DME     1 Units    Equipment being ordered: Nebulizer and tubing supplies       * order for DME     1 Device    Equipment being ordered: CPAP supplies mask, hose, filters, cushion fax to [facility] at [phone] Disp: 10 DeviceRefills: prn Class: Local PrintStart: 2/10/2017       * order for DME     10 Device    Equipment being ordered: CPAP supplies mask, hose, filters, cushion  fax to [facility] at [phone]       * order for DME     1 Device    Equipment being ordered: wheelchair seat cushion       pantoprazole 40 MG EC tablet    PROTONIX     Take 40 mg by mouth daily       Phenytoin Sodium Extended 200 MG Caps     60 capsule    Take 200 mg by mouth 2 times daily       polyethylene glycol powder    MIRALAX/GLYCOLAX     Take 17 g by mouth daily       prochlorperazine 10 MG tablet    COMPAZINE    20 tablet    Take 1 tablet (10 mg) by mouth every 8 hours as needed       risperiDONE 0.5 MG tablet    risperDAL     Take 0.5 mg by mouth At Bedtime       rOPINIRole 0.25 MG tablet    REQUIP    270 tablet    Take 3 tablets (0.75 mg) by mouth At Bedtime       SPIRIVA HANDIHALER 18 MCG capsule   Generic drug:  tiotropium     30 capsule    INHALE THE CONTENTS OF 1 CAPSULE VIA INHALATION DEVICE DAILY       sucralfate 1 GM tablet    CARAFATE     40 tablet    Take 1 tablet (1 g) by mouth 4 times daily May dissolve in 10 mL water is necessary. (Start upon completion of carafate suspension)       traZODone 100 MG tablet    DESYREL    90 tablet    Take 1 tablet (100 mg) by mouth At Bedtime       vitamin D 2000 UNITS tablet     100 tablet    Take 2,000 Units by mouth daily.       zinc 50 MG Tabs      Take 1 tablet by mouth daily       * Notice:  This list has 12 medication(s) that are the same as other medications prescribed for you. Read the directions carefully, and ask your doctor or other care provider to review them with you.       06-Jun-2022 03:16

## 2022-06-07 NOTE — TELEPHONE ENCOUNTER
Brimonidine Tartrate 0.2 % Solution  Last Written Prescription Date:  ?  Last Fill Quantity: ?,   # refills: ?  Last Office Visit :  5/16/2022  Future Office visit:  11/17/2022     Mel Burnett MD    Ophthalmology       PLAN  Patient will continue on   - Cosopt (Timolol/Dorzolamide) which is a blue top drop 2x/day (12 hours apart) in the RIGHT EYE ONLY,  - Latanoprost which is a teal top drop at bedtime in both eyes, and   - Alphagan (Brimonidine) which is a purple top drop 2x/day (12 hours apart) in the RIGHT EYE ONLY.      - Patient will return to clinic in 6 months: dilated eye exam, macula Optical Coherence Tomography, ON RNFL analysis and IOP check. Attempt LVF (if able to move)  - recommend ashish with low vision refraction  - follow up in 6 months with glaucoma and curiel     Routing refill request to provider for review/approval because:  Medication is reported/historical      Alannah Burgos RN  Central Triage Red Flags/Med Refills

## 2022-09-19 NOTE — TELEPHONE ENCOUNTER
Epi Pen    Routing refill request to provider for review/approval because:  Drug not active on patient's medication list    Last office visit: 11/4/2020 with prescribing provider  Future Office Visit:      Valeria GIFFORD, RN, PHN         Nutrition Assessment   Assessment Type: Follow Up  Chart Medications Lab Results Reviewed: yes   Food Allergies: no    Current Diet Order: Pediatric, Low K  Diet Tolerance: Good      Anthropometrics  Gender: male      Patient Age: 16 year old 5 month old  Growth Chart: CDC  Weight: 58.2 kg (daily AM weight on standing scale) (09/19/22 0828); Z-score: -0.82  Height/Length: 157.6 cm; Z-score: -2.45  BMI: 23.4; (50th - 75th %tile; Z-score: 0.58)  Ideal Body Weight: 53.4 kg; 109% IBW    Weight Classification: Normal      Estimated Nutritional Needs  Assessment Weight: 60.5 kg (dose wt)  Energy Needs: 37 kcal/kg (DRI)  Protein Needs: 0.7-1.1 g/kg (DRI x IBW / dose wt)  Fluid Needs: 2310 ml/day per [facility] or per nephrology    Nutrition Diagnosis (PES)  Increased Nutrient Needs related to Other: CKD as evidenced by Calculated needs    Nutrition Plan  Recommended Nutrition Intervention: Other: continue pediatric, low potassium diet as tolerated  Monitor: Calorie and Protein Intake, Intake & Output, Lab Results, Oral Intake, Vitamin/Mineral Supplement and Weight  Discharge Needs: None  Care Plan Discussed With: Physician and Multidisciplinary Team    Goals: Express comprehension of nutrition intervention, Increase oral intake to 50-75%of meals and supplements, Maintain Current Nutrition Status During Hospitalization, Meet 75 to 100% of estimated needs/all source nutrient intake and Tolerate oral diet by discharge  Goal Progress: Extended  Timeframe to Achieve Goal: By Discharge    Nutritional Risk Status: Moderate      HPI / NUTRITION ASSESSMENT: Pt assessed for f/u. Pt is tolerating Pediatric, Low K diet w/ good/fair PO intake. Appetite has increased since initiating HD. Pt remains appropriate for diet liberalization (but still w/ low K) to allow for more food options while inpatient- diet order change discussed w/ resident physician and nephrology 9/16. Weight has been stable ~58-59kg since admit.      Weight hx  reviewed:  -Peak weight of 160# (72.6kg) in 3/2021, 19% change (30# loss) in 1.5 years, not clinically significant  -150# (68.2kg) on 9/30/2021, 13% change (20# loss) x 1 year, not clinically significant  -143# (65kg) on 3/24/2022, 9% change (13# loss) x 6mos, clinically significant  -Weight has been relatively stable ~58-59kg since July  -BMI for age z-score currently indicates normal weight status. Recommend to maintain current weight.      RD RECOMMENDATIONS:  1. Recommend to continue Pediatric, Low Potassium diet as tolerated  -Diet education reviewed 9/16    2. Monitor diet tolerance, PO intake/appetite, I/O's, daily weights, lab trends, nephro recs, plan of care  3. Adjust nutrition prn to meet estimated nutritional needs and per clinical course as needed      RD to f/u per protocol  Stella Mccullough MS, RD, LDN

## 2022-09-20 NOTE — TELEPHONE ENCOUNTER
This patient is established with [facility]. Refill should should be sent there, message sent to pharmacy.

## 2022-09-23 NOTE — PROGRESS NOTES
[facility]    OUTPATIENT OCCUPATIONAL THERAPY  PLAN OF TREATMENT FOR OUTPATIENT REHABILITATION AND PROGRESS NOTE    Patient's Last Name, First Name, Sonya Hurtado Date of Birth  1950   Provider's Name  [facility] Medical Record No.  [id number]    Onset Date:  6/9/21 - date of order Start of Care Date  09/16/21   Type:     __PT   _X_OT   __SLP Medical Diagnosis  Primary open angle glaucoma of both eyes, severe stage H40.1133  - Primary   OT Diagnosis  Impaired ADL/IADL with deficits in Reading based ADL, Written communication, Dressing, Grooming, Eating (mobility)  Decreased vision, neuropathy in hands, decreased overall strength, decreased mobility, pain Plan of Treatment  Frequency/Duration: up to 6 visits over 6 month time frame, at a frequency of no more than 1x/week     Certification date from 6/1/22 to 10/29/22 (extended date as patient not seen between 4/29/22 and 9/23/22 due to hospitalization, illness, scheduling conflicts)     Goals/Plan/Progress: See most recent progress note dated 4/29/22 as patient only seen 1 visit since then.  Detailed progress note to follow after next visit or 10/29/22 when next cert/goal progress due.           I CERTIFY THE NEED FOR THESE SERVICES FURNISHED UNDER        THIS PLAN OF TREATMENT AND WHILE UNDER MY CARE     (Physician co-signature of this document indicates review and certification of the therapy plan).                Referring Provider: Mel Burnett MD Amy Glaser, OT

## 2022-10-06 NOTE — TELEPHONE ENCOUNTER
Call and spoke with pharmacy. Patient has established care with [facility]. Pharmacy resent script to the appropriate provider.     Rebecca Curry RN

## 2022-10-25 NOTE — PROGRESS NOTES
Admitted to [facility]     CM received call from member stating that she wants to look at having her dental appt at [facility] clinic rather than [facility] and would like to call to see if openings.  CM and member conference call to [facility] - spoke with Sherrell.   Scheduled appt at [facility] dental clinic for 6/4 @ 11 a.m.   They notified member that there is no office at the surgery center any long, it is only at [facility] - they will mail member information.    Appt on 6/11 at [facility] was canceled.    Jamie Sharma RN, PHN  [facility]

## 2022-10-31 NOTE — OR NURSING
- GI to perform endoscopy today  - F/u Results  - Recommend PPI BID  - Admitted to Medical team, rest of care per IM Call placed to EDWARDO [place], in Urology triage since Dr Oliver's nurse, Yi, is unavailable today. Ofelia Mcduffie PA-C in PAC, received a recommendation from Dr Oliver stating that pt would benefit from being seen by a  hospitalist during her upcoming surgical admission and may need to be admitted before surgery. I asked Ludy to let Dr Oliver know PAC providers do not arrange admission before surgery or hospitalist consults. Ludy will let Dr Oliver know.  Enriqueta Arriaga RN, BSN-[facility].

## 2022-11-12 NOTE — TELEPHONE ENCOUNTER
Alexandria RN with [facility] calling, [phone]    Homecare RN would like to discontinue wound care orders; scab on coccyx is now scabbed over. They will continue to see patient monthly for catheter changes.    PCP please advise if ok with plan.     Valeria GIFFORD, RN, PHN        
I approve of requested home care orders.    Allan Casey MD    
Informed home care RN  
6 Hour(s) 1 Minute(s)

## 2022-11-17 NOTE — PROGRESS NOTES
Assessment/Plan  (H40.1113) Primary open-angle glaucoma, right eye, severe stage  (primary encounter diagnosis)  Comment: Per ophthalmology note.   Plan: Continue care with Dr. Burnett. Emphasized importance of adequate lighting and magnification.     (H54.3) Blindness of left eye with category 2 low vision of right eye  Plan: Monitor. Return to clinic with new or worsening eye pain.     (Z96.1) Pseudophakia, right eye  Comment: Minimal refractive error present  Plan: Monitor.     (H52.4) Presbyopia  Plan: REFRACTION [[id number]]        Discussed findings with patient. Good improvement in acuity potential with updated refraction today. Patient prefers progressive lenses given long time use of this lens design. Full time wear recommendd due to monocular status. Some additional use of handheld/screen magnification may still be needed at times, but improvement in acuity today suggests that magnification of 2-3X should allow for near normal reading speeds.           45 minutes were spent on the date of the encounter doing chart review, history and exam, documentation, and further activities as noted above.    Complete documentation of historical and exam elements from today's encounter can  be found in the full encounter summary report (not reduplicated in this progress  note). I personally obtained the chief complaint(s) and history of present illness. I  confirmed and edited as necessary the review of systems, past medical/surgical  history, family history, social history, and examination findings as documented by  others; and I examined the patient myself. I personally reviewed the relevant tests,  images, and reports as documented above. I formulated and edited as necessary the  assessment and plan and discussed the findings and management plan with the  patient and family.    Monty العلي OD

## 2022-11-17 NOTE — PROGRESS NOTES
CC: follow up glaucoma     Interval: Patient presents for low vision evaluation. No acute vision changes reported. Patient feels that vision is gradually getting worse with time. She has difficulties distinguishing faces and details. Reading is very challenging. She feels that her glasses are falling apart so she would like to get them updated. Patient does use a magnifier at home to help with reading.    HPI: Sonya Foote is a  71 year old year-old patient with history of Primary open angle glaucoma (POAG), here for follow up. Reports no changes in vision, no flashes and floaters       Retinal Imaging:  OCT ONFL 11/17/22  RE: stable temporal thinning  LE: diffuse thinning stable    Macula Optical Coherence Tomography: 11/17/22  OD-good contour with no fluid  OS-thinning with no fluid    OVF 11/17/22  OD with apparent inferior altitudinal deficit, however, high false negative errors 33%; previous OVF unreliable as well    Assessment & Plan:    # POAG/?LTG vs Glc Suspect   -- s/p SLT OD (3/13/15),  -- H/O negative head CT and MRI 2013,2014 for headaches.  -- K pachy: 539/540     Tmax: OD:23      HVF: OD:[facility] on LVC in 2016 (unable to transfer out of chair)       CDR: 0.7/0.9 (pallor OS>OD)      HRT/OCT: Severe RNFL thinning OS>OD         FHX of Glc: Father, grandparents -- lost vision, was on gtts       Asthma/COPD: Yes, on inhalers but tolerating topical and po BB   Steroid Use: No    Kidney Stones: No    Sulfa Allergy: No    Overall exam continues to showed stability; will continue to monitor ONFL and intraocular pressure     # PCIOL OD   -- vision loss in the right eye predates 2013 (pt believes she lost all vision in 2005) and she had several MRI's in 2013 including one with orbital sequences which did not reveal any structural change to cause a right optic neuropathy.     # DM without  Diabetic retinopathy  Blood pressure (<120/80) and blood glucose (HbA1c <7.0) control discussed with patient.  Patient advised that failure to adequately control each may lead to vision loss. The patient expressed understanding.    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    #Dry eye syndrome   Recommend artificial tears  As needed   warm compresses     Stable eye exam    PLAN  Patient will continue on   - Cosopt (Timolol/Dorzolamide) which is a blue top drop 2x/day (12 hours apart) in the RIGHT EYE ONLY,  - Latanoprost which is a teal top drop at bedtime in both eyes, and   - Alphagan (Brimonidine) which is a purple top drop 2x/day (12 hours apart) in the RIGHT EYE ONLY.     - recommend continue follow up with glaucoma service  -Continue f/u with Dr. العلي    RTC:  1)Javier in 2-3 months  2)Retina 1 year Dilated exam  3)Dr. العلي as recommended      Andrew Aguilar MD MPH  Vitreoretinal Fellow PGY-5  [facility]     ~~~~~~~~~~~~~~~~~~~~~~~~~~~~~~~~~~   Complete documentation of historical and exam elements from today's encounter can be found in the full encounter summary report (not reduplicated in this progress note).  I personally obtained the chief complaint(s) and history of present illness.  I confirmed and edited as necessary the review of systems, past medical/surgical history, family history, social history, and examination findings as documented by others; and I examined the patient myself.  I personally reviewed the relevant tests, images, and reports as documented above.  I personally reviewed the ophthalmic test(s) associated with this encounter, agree with the interpretation(s) as documented by the resident/fellow, and have edited the corresponding report(s) as necessary.   I formulated and edited  as necessary the assessment and plan and discussed the findings and management plan with the patient and family    Mel Burnett MD   of Ophthalmology.  Retina Service   Department of Ophthalmology and Visual Neurosciences   [facility]  Phone: [phone]   Fax: [phone]

## 2023-01-01 ENCOUNTER — DOCUMENTATION ONLY (OUTPATIENT)
Dept: OTHER | Facility: CLINIC | Age: 73
End: 2023-01-01
Payer: COMMERCIAL

## 2023-01-01 ENCOUNTER — OFFICE VISIT (OUTPATIENT)
Dept: OPHTHALMOLOGY | Facility: CLINIC | Age: 73
End: 2023-01-01
Attending: OPHTHALMOLOGY
Payer: MEDICARE

## 2023-01-01 ENCOUNTER — DOCUMENTATION ONLY (OUTPATIENT)
Dept: OTHER | Facility: CLINIC | Age: 73
End: 2023-01-01

## 2023-01-01 DIAGNOSIS — H40.1133 PRIMARY OPEN ANGLE GLAUCOMA OF BOTH EYES, SEVERE STAGE: Primary | ICD-10-CM

## 2023-01-01 DIAGNOSIS — Z96.1 PSEUDOPHAKIA: ICD-10-CM

## 2023-01-01 DIAGNOSIS — H54.42A3 BLINDNESS LEFT EYE CATEGORY 3, NORMAL VISION RIGHT EYE: ICD-10-CM

## 2023-01-01 PROCEDURE — 76514 ECHO EXAM OF EYE THICKNESS: CPT | Performed by: OPHTHALMOLOGY

## 2023-01-01 PROCEDURE — 99214 OFFICE O/P EST MOD 30 MIN: CPT | Mod: GC | Performed by: OPHTHALMOLOGY

## 2023-01-01 PROCEDURE — G0463 HOSPITAL OUTPT CLINIC VISIT: HCPCS

## 2023-01-01 ASSESSMENT — TONOMETRY
IOP_METHOD: TONOPEN
IOP_METHOD: TONOPEN
OD_IOP_MMHG: 22
OD_IOP_MMHG: 22
IOP_METHOD: PERKINS TONOMETER
OD_IOP_MMHG: 25
OS_IOP_MMHG: 17

## 2023-01-01 ASSESSMENT — PACHYMETRY
OS_CT(UM): 548
OD_CT(UM): 525

## 2023-01-01 ASSESSMENT — VISUAL ACUITY
OS_CC: NLP
CORRECTION_TYPE: GLASSES
METHOD: SNELLEN - LINEAR
OD_CC: 20/70

## 2023-01-01 ASSESSMENT — EXTERNAL EXAM - RIGHT EYE: OD_EXAM: NORMAL

## 2023-01-01 ASSESSMENT — REFRACTION_WEARINGRX
OD_CYLINDER: SPHERE
OD_SPHERE: -0.75
OD_ADD: +3.25
OS_SPHERE: BALANCE

## 2023-01-01 ASSESSMENT — SLIT LAMP EXAM - LIDS
COMMENTS: NORMAL
COMMENTS: NORMAL

## 2023-01-01 ASSESSMENT — CONF VISUAL FIELD
OD_INFERIOR_TEMPORAL_RESTRICTION: 3
OS_INFERIOR_NASAL_RESTRICTION: 1
OD_SUPERIOR_NASAL_RESTRICTION: 1
OS_SUPERIOR_TEMPORAL_RESTRICTION: 1
OD_SUPERIOR_TEMPORAL_RESTRICTION: 1
OD_INFERIOR_NASAL_RESTRICTION: 1
OS_INFERIOR_TEMPORAL_RESTRICTION: 1
OS_SUPERIOR_NASAL_RESTRICTION: 1

## 2023-01-01 ASSESSMENT — EXTERNAL EXAM - LEFT EYE: OS_EXAM: NORMAL

## 2023-01-18 NOTE — PROGRESS NOTES
Chief Complaint(s) and History of Present Illness(es)     Glaucoma Evaluation            Laterality: both eyes    Quality: blurred    Associated symptoms: eye pain, photophobia and flashes.  Negative for   floaters    Compliance with Treatment: always          Comments    Here for glaucoma evaluation. Vision is gradually worsening in the right   eye. She notes occasional flashes and eye pain. Compliant with eye drops.     Rosales Curiel COT 2:44 PM January 18, 2023     Current drops:  - Cosopt BID right eye.   - Latanoprost at bedtime both eyes.   - Brimonidine BID right eye.       Review of systems for the eyes was negative other than the pertinent positives/negatives listed in the HPI.      Assessment & Plan      Sonya Foote is a 72 year old female with the following diagnoses:   1. Primary open angle glaucoma of both eyes, severe stage    2. Blindness left eye category 3, normal vision right eye    3. Pseudophakia - Right Eye      Referral from Dr. Burnett  Patient feels vision is stable.  Nursing staff administer eye drops      Prior OCT RNFL 11/17/22:  - Right eye: Diffuse thinning, stable.     Prior Visual Field LVC 11/17/22  - Right eye: Dense peripheral loss, more inferiorly than superiorly, similar to prior.     - Patient's right IOP elevated today on max drop therapy.   Good compliance with staff administering her drops at assisted living.   Right eye with only central island of vision remaining and no light perception left eye.     Surgery recommended  Discussed the OMNI vs. tube shunt. Patient interested in tube surgery at this time.     PLAN:  - Refer to Dr. Herrera for right eye glaucoma tube shunt surgery.              Chetan Alexander MD  Ophthalmology, PGY-3    Attending Physician Attestation:  Complete documentation of historical and exam elements from today's encounter can be found in the full encounter summary report (not reduplicated in this progress note).  I personally obtained the chief  complaint(s) and history of present illness.  I confirmed and edited as necessary the review of systems, past medical/surgical history, family history, social history, and examination findings as documented by others; and I examined the patient myself.  I personally reviewed the relevant tests, images, and reports as documented above.  I formulated and edited as necessary the assessment and plan and discussed the findings and management plan with the patient and family. . - Darwin Fish MD

## 2023-01-18 NOTE — Clinical Note
Sending this pleasant patient your way for tube surgery.  Not sure if you want to place a case request to schedule surgery in advance of the consult with [facility] in only seeing eye.  Let me know if you have questions,   Thanks  Regino

## 2023-01-18 NOTE — NURSING NOTE
Chief Complaints and History of Present Illnesses   Patient presents with     Glaucoma Evaluation     Chief Complaint(s) and History of Present Illness(es)     Glaucoma Evaluation            Laterality: both eyes    Quality: blurred    Associated symptoms: eye pain, photophobia and flashes.  Negative for floaters    Compliance with Treatment: always          Comments    Here for glaucoma evaluation. Vision is gradually worsening in the right eye. She notes occasional flashes and eye pain. Compliant with eye drops.     Rosales Curiel COT 2:44 PM January 18, 2023

## 2023-03-13 NOTE — TELEPHONE ENCOUNTER
Referral placed   The following standard precautions for infection control were utilized:  Mask  Patient was in bed with her  right by her side. We all talked about whatever she would say. It was not on a specific topic but she would talk.  and grandchild are doing ok and  will follow up with them both.

## 2023-05-16 NOTE — PROGRESS NOTES
[facility]      OCCUPATIONAL THERAPY VISUAL REHABILITATION DISCHARGE SUMMARY     Patient: Sharath Mercedes  : 1950  Insurance:   Payer/Plan Subscriber Name Rel Member # Group #   [employer] [facility] SHARATH MERCEDES Self [id number] [address]       Beginning/End Dates of Reporting Period:  22 to 2023 (last seen on 10/4/22)    Therapy Diagnosis:    Therapy  Diagnosis: Impaired ADL/IADL with deficits in: Reading based ADL, Written communication, Dressing, Grooming, Eating (mobility)  Impairment comments: Decreased vision, neuropathy in hands, decreased overall strength, decreased mobility, pain       Client Self Report: 10/4/22: Patient fell last week - manual chair fell backwards and she got a concussion, increased neck pain, etc.  Having more tests next week to rule things out.  Still H/A - currently 8/10 pain.  Also wearing a sling on R UE due to edema.    GOALS     Goal 1 - Near vision        Near Vision Goal Comment: Patient will demonstrate 3 pieces of adaptive equipment and/or adaptive techniques in regards to magnification, lighting, contrast and glare for increased ADL/IADL independence with near vision tasks (reading, writing).   Target Date: 10/29/22   GOAL PROGRESS: Goal partially but not fully met due to limited number of treatment sessions.  Please see adaptive equipment/optical devices recommended below for details of education completed and recommendations made prior to unexpected discharge.     Goal 2 - Visual field                     Goal 3 - Environmental modification        Environmental Modification Goal Comment: Patient will demonstrate and/or verbalize 3 environmentally-based ADL modifications to improve visual-based ADL/IADL activities.   Target Date: 10/29/22    GOAL PROGRESS: Goal partially but not fully met due to limited number of treatment sessions.   Please see adaptive equipment/optical devices recommended below for details of education completed and recommendations made prior to unexpected discharge.    Goal 4 - Resource education    Goal Description: Resource education: Patient will verbalize awareness of community resources for the following:, Audio access to print materials, Access to large print materials, Access to low vision devices       Target Date: 10/29/22    GOAL PROGRESS: Goal partially but not fully met due to limited number of treatment sessions.  Please see adaptive equipment/optical devices recommended below for details of education completed and recommendations made prior to unexpected discharge.    Progress Toward Goals  Progress: Patient seen for 2 visits since last progress note, please see earlier progress notes for details of earlier performance and progress    Reason for Discharge  Patient has failed to schedule further appointments.    Adaptive Equipment/Optical Devices Recommended:  Cell phone and ipad adaptations: fixed/adjusted the sound on phone (couldn't see it was in silent mode), adjusted the brightness on phone - and blue light less warm for greater visability, feature to turn on speaker when calling people via voice commands, use voice more to wake up Dayami (voice commands) on phone and ipad (still works through earbuds); Consider getting rubberized finger tips to remove your earbuds from the case due to ROM and strength limitations in hands and visual limitations, extensive education and practice with spoken Content/Screen reader on both, skilled review and reeducation in use of zoom feature; Larger font, Bold font, Night shift on/blue light filter on so screen isn t so bright, Calls will be announced if they are in her contacts, voice commands/Dayami - extensive education and trial - voice and side button; turned off your passcode so don t have to enter it every time you open phone, Google: engaged microphone to speak what to  search.; Zoom/magnify screen; refusing plain background and organization of icons (aside from moving Google ashish and Weather [employer] to home page/front page)    Discharge Plan  Patient to continue home program/techniques

## 2023-06-26 NOTE — PROGRESS NOTES
5/10/2021- JL - SCHEDULED SETUP IN [facility] FOR 5/13/2021 AT 11AM.  
sensory intact

## 2023-08-17 NOTE — TELEPHONE ENCOUNTER
Okay to order but am not there to sign until Monday   Zoryve Pregnancy And Lactation Text: It is unknown if this medication can cause problems during pregnancy and breastfeeding.

## 2024-07-24 NOTE — TELEPHONE ENCOUNTER
I approve of requested  orders. Ok for implement nutrition recommendations.     Azael Zayas MD     [General Appearance - Alert] : alert [General Appearance - In No Acute Distress] : in no acute distress [General Appearance - Well Nourished] : well nourished [Extraocular Movements] : extraocular movements were intact [Hearing Threshold Finger Rub Not Porter] : hearing was normal [Neck Appearance] : the appearance of the neck was normal [Edema] : there was no peripheral edema [Abdomen Soft] : soft [Musculoskeletal - Swelling] : no joint swelling [] : no rash [FreeTextEntry1] : b/l toneails with fungal infection, more on first, 3rd, 4th toe [Affect] : the affect was normal

## 2024-08-16 NOTE — TELEPHONE ENCOUNTER
That would fine.     warm up. Patient then performed exercise in gym. Patient able to perform standing exercise in // with the use of only fingertips on // bars to assist. Verbal cues for core contraction with standing exercises. Mat exercises performed with good tolerance. Session ended with patient seated in chair with MHP for 1o mins for symptom reduction.       [] Other:   [x] Patient would continue to benefit from skilled physical therapy services in order to: inc lumbar AROM with dec P; inc core, B hip gross strength; and overall improve tolerance for standing and ambulation.     STG: (to be met in 6 treatments)  ? Pain: Patient will report <5/10 pain with active movement [standing for cooking/cleaning] in order to improve QOL. Progress  ? ROM: Will demo all lumbar spine AROM with <3/10 P for improved functional mobility. Not met  ? Strength: Will demo GOOD PPT in functional positions; 4/5 gross B LE strength in order to better support lumbar spine and prevent future injuries. MET  ? Function: Will stand 10-15 minutes with <3/10 P for improved cooking, house work tolerance.   Patient to be independent with home exercise program as demonstrated by performance with correct form without cues. Progress  Demonstrate Knowledge of fall prevention MET  LTG: (to be met in 12 treatments)  Will amb 10-15 minutes with <3/10 P for improved community ambulation.   Improve modified oswestry to <36% impaired or limited      Patient goals: \"walk without pain- play golf.\"     Pt. Education:  [x] Yes  [x] No  [x] Reviewed Prior HEP/Ed  Method of Education: [] Verbal  [] Demo  [] Written  Comprehension of Education:  [x] Verbalizes understanding.  [] Demonstrates understanding.  [] Needs review.  [] Demonstrates/verbalizes HEP/Ed previously given.    AAccess Code: [email or web address]  URL: [email or web address]  Date: 08/09/2024  Prepared by: Misael Youngblood    Exercises  - Supine Single Knee to Chest Stretch  - 1 x daily - 7 x weekly - 3 sets - 10

## 2024-12-24 NOTE — CONFIDENTIAL NOTE
Called to schedule surgery with Dr Gordon.  Patient states that she is on Hospice care and surgery is not needed.   has outpatient follow up appointment on Thursday, December 26th

## (undated) DEVICE — TAPE CLOTH 3" CARDINAL 3TRCL03

## (undated) DEVICE — GLOVE PROTEXIS W/NEU-THERA 8.0  2D73TE80

## (undated) DEVICE — SYR 30ML LL W/O NDL 302832

## (undated) DEVICE — DEVICE RETRIEVAL ROTH NET PLATINUM UNIV 2.5MMX230CM 00715050

## (undated) DEVICE — COVER NEOPROBE SOFTFLEX 5X96" W/BANDS 20-PC596

## (undated) DEVICE — GLOVE PROTEXIS W/NEU-THERA 7.5  2D73TE75

## (undated) DEVICE — NDL WOLF ASPIRATING INJECT 22GA 31.25CM 8652.7775

## (undated) DEVICE — NDL BLADDER INJ BONEE 70CM NBI070

## (undated) DEVICE — SUCTION MANIFOLD DORNOCH ULTRA CART UL-CL500

## (undated) DEVICE — LINEN TOWEL PACK X5 5464

## (undated) DEVICE — NDL BLUNT 18GA 1" W/O FILTER 305181

## (undated) DEVICE — PACK CYSTO UMMC CUSTOM

## (undated) DEVICE — NDL SPINAL 22GA 3.5" QUINCKE 405181

## (undated) DEVICE — PAD CHUX UNDERPAD 23X24" 7136

## (undated) DEVICE — PACK GOWN 3/PK DISP XL SBA32GPFCB

## (undated) DEVICE — CATH FOLEY 14FR 5ML SILVER COAT LATEX 0165SI14

## (undated) DEVICE — CONNECTOR WATER VALVE PERFUSION PACK STR 020272801

## (undated) DEVICE — BAG URINARY DRAIN LUBRISIL IC 4000ML LF 253509A

## (undated) DEVICE — DRAPE SHEET MED 44X70" 9355

## (undated) DEVICE — KIT ENDO FIRST STEP DISINFECTANT 200ML W/POUCH EP-4

## (undated) DEVICE — SOL WATER IRRIG 3000ML BAG 2B7117

## (undated) DEVICE — ENDO BASKET RETRIEVAL F/BILE DUCT STN FG-V431P

## (undated) DEVICE — ENDO SYSTEM WATER BOTTLE & TUBING W/CO2 FILTER 00711549

## (undated) DEVICE — CATH TRAY FOLEY SURESTEP 16FR W/URNE MTR STLK LATEX A303316A

## (undated) DEVICE — SYR 70ML TOOMEY 041170

## (undated) DEVICE — SOL WATER IRRIG 1000ML BOTTLE 2F7114

## (undated) DEVICE — DRAPE U SPLIT 74X120" 29440

## (undated) DEVICE — TRAY PAIN INJECTION 97A 640

## (undated) DEVICE — CATH TRAY FOLEY SURESTEP 16FR W/URINE MTR STATLK LF A303416A

## (undated) DEVICE — SYR 30ML SLIP TIP W/O NDL 302833

## (undated) DEVICE — JELLY LUBRICATING SURGILUBE 2OZ TUBE

## (undated) DEVICE — LINEN GOWN XLG 5407

## (undated) DEVICE — NDL TROCAR TWO-PART 18GA 20CM

## (undated) DEVICE — BAG URINARY DRAIN 4000ML LF 153509

## (undated) DEVICE — DEVICE CATH STABILIZATION STATLOCK FOLEY 3-WAY FOL0105

## (undated) DEVICE — SYR 10ML LL W/O NDL 302995

## (undated) DEVICE — SOL NACL 0.9% IRRIG 3000ML BAG 2B7477

## (undated) DEVICE — ENDO SNARE POLYPECTOMY OVAL 25MM LOOP SD-240U-25

## (undated) DEVICE — ENDO BITE BLOCK ADULT OMNI-BLOC

## (undated) DEVICE — TUBING SUCTION 10'X3/16" N510

## (undated) DEVICE — SNARE CAPIVATOR II POLYPECTOMY 15X240MM M00561230

## (undated) DEVICE — SNARE CAPIVATOR II POLYPECTOMY 10X240MM M00561220

## (undated) DEVICE — GLOVE PROTEXIS MICRO 6.0  2D73PM60

## (undated) DEVICE — BAG DRAINAGE URO CATCHER II 4-040-14-10

## (undated) DEVICE — DEVICE RETRIEVAL ROTH NET FB PED 00711057

## (undated) DEVICE — ENDO FORCEP BX CAPTURA JUMBO SPIKE 2.8MMX230CM G53042

## (undated) DEVICE — BLADE KNIFE SURG 11 371111

## (undated) DEVICE — BASIN SET SINGLE STERILE 13752-624

## (undated) DEVICE — SOL NACL 0.9% IRRIG 1000ML BOTTLE 2F7124

## (undated) DEVICE — SUCTION MANIFOLD NEPTUNE 2 SYS 4 PORT 0702-020-000

## (undated) DEVICE — CATH INTRODUCER SUPRAPUBIC 16FR SF-S16-851

## (undated) DEVICE — PREP CHLORAPREP W/ORANGE TINT 10.5ML 260715

## (undated) DEVICE — ENDO FORCEP ENDOJAW BIOPSY 2.8MMX230CM FB-220U

## (undated) RX ORDER — LIDOCAINE HYDROCHLORIDE 10 MG/ML
INJECTION, SOLUTION EPIDURAL; INFILTRATION; INTRACAUDAL; PERINEURAL
Status: DISPENSED
Start: 2021-04-16

## (undated) RX ORDER — FENTANYL CITRATE 50 UG/ML
INJECTION, SOLUTION INTRAMUSCULAR; INTRAVENOUS
Status: DISPENSED
Start: 2021-01-07

## (undated) RX ORDER — CLINDAMYCIN PHOSPHATE 900 MG/50ML
INJECTION, SOLUTION INTRAVENOUS
Status: DISPENSED
Start: 2018-01-16

## (undated) RX ORDER — HYDRALAZINE HYDROCHLORIDE 20 MG/ML
INJECTION INTRAMUSCULAR; INTRAVENOUS
Status: DISPENSED
Start: 2018-01-16

## (undated) RX ORDER — PROPOFOL 10 MG/ML
INJECTION, EMULSION INTRAVENOUS
Status: DISPENSED
Start: 2021-07-22

## (undated) RX ORDER — PROPOFOL 10 MG/ML
INJECTION, EMULSION INTRAVENOUS
Status: DISPENSED
Start: 2021-01-07

## (undated) RX ORDER — ONDANSETRON 2 MG/ML
INJECTION INTRAMUSCULAR; INTRAVENOUS
Status: DISPENSED
Start: 2021-07-22

## (undated) RX ORDER — FENTANYL CITRATE 50 UG/ML
INJECTION, SOLUTION INTRAMUSCULAR; INTRAVENOUS
Status: DISPENSED
Start: 2018-01-16

## (undated) RX ORDER — ALBUTEROL SULFATE 90 UG/1
AEROSOL, METERED RESPIRATORY (INHALATION)
Status: DISPENSED
Start: 2020-10-08

## (undated) RX ORDER — FENTANYL CITRATE 50 UG/ML
INJECTION, SOLUTION INTRAMUSCULAR; INTRAVENOUS
Status: DISPENSED
Start: 2019-10-31

## (undated) RX ORDER — TRIAMCINOLONE ACETONIDE 40 MG/ML
INJECTION, SUSPENSION INTRA-ARTICULAR; INTRAMUSCULAR
Status: DISPENSED
Start: 2021-02-17

## (undated) RX ORDER — LIDOCAINE HYDROCHLORIDE 20 MG/ML
INJECTION, SOLUTION EPIDURAL; INFILTRATION; INTRACAUDAL; PERINEURAL
Status: DISPENSED
Start: 2019-10-31

## (undated) RX ORDER — LIDOCAINE HYDROCHLORIDE 10 MG/ML
INJECTION, SOLUTION EPIDURAL; INFILTRATION; INTRACAUDAL; PERINEURAL
Status: DISPENSED
Start: 2021-02-17

## (undated) RX ORDER — FENTANYL CITRATE 50 UG/ML
INJECTION, SOLUTION INTRAMUSCULAR; INTRAVENOUS
Status: DISPENSED
Start: 2020-05-21

## (undated) RX ORDER — ONDANSETRON 2 MG/ML
INJECTION INTRAMUSCULAR; INTRAVENOUS
Status: DISPENSED
Start: 2019-10-31

## (undated) RX ORDER — OXYCODONE HYDROCHLORIDE 5 MG/1
TABLET ORAL
Status: DISPENSED
Start: 2018-01-16

## (undated) RX ORDER — GABAPENTIN 300 MG/1
CAPSULE ORAL
Status: DISPENSED
Start: 2020-10-08

## (undated) RX ORDER — EPHEDRINE SULFATE 50 MG/ML
INJECTION, SOLUTION INTRAMUSCULAR; INTRAVENOUS; SUBCUTANEOUS
Status: DISPENSED
Start: 2021-01-07

## (undated) RX ORDER — LIDOCAINE HYDROCHLORIDE 20 MG/ML
INJECTION, SOLUTION EPIDURAL; INFILTRATION; INTRACAUDAL; PERINEURAL
Status: DISPENSED
Start: 2021-07-22

## (undated) RX ORDER — CIPROFLOXACIN 2 MG/ML
INJECTION, SOLUTION INTRAVENOUS
Status: DISPENSED
Start: 2020-10-08

## (undated) RX ORDER — CIPROFLOXACIN 500 MG/1
TABLET, FILM COATED ORAL
Status: DISPENSED
Start: 2019-05-31

## (undated) RX ORDER — FENTANYL CITRATE 50 UG/ML
INJECTION, SOLUTION INTRAMUSCULAR; INTRAVENOUS
Status: DISPENSED
Start: 2018-05-02

## (undated) RX ORDER — HYDROMORPHONE HYDROCHLORIDE 1 MG/ML
INJECTION, SOLUTION INTRAMUSCULAR; INTRAVENOUS; SUBCUTANEOUS
Status: DISPENSED
Start: 2018-01-16

## (undated) RX ORDER — CLINDAMYCIN PHOSPHATE 900 MG/50ML
INJECTION, SOLUTION INTRAVENOUS
Status: DISPENSED
Start: 2021-07-22

## (undated) RX ORDER — LIDOCAINE HYDROCHLORIDE 20 MG/ML
INJECTION, SOLUTION EPIDURAL; INFILTRATION; INTRACAUDAL; PERINEURAL
Status: DISPENSED
Start: 2021-01-07

## (undated) RX ORDER — DIPHENHYDRAMINE HYDROCHLORIDE 50 MG/ML
INJECTION INTRAMUSCULAR; INTRAVENOUS
Status: DISPENSED
Start: 2018-01-10

## (undated) RX ORDER — HEPARIN SODIUM 1000 [USP'U]/ML
INJECTION, SOLUTION INTRAVENOUS; SUBCUTANEOUS
Status: DISPENSED
Start: 2019-10-31

## (undated) RX ORDER — ALBUTEROL SULFATE 0.83 MG/ML
SOLUTION RESPIRATORY (INHALATION)
Status: DISPENSED
Start: 2021-07-22

## (undated) RX ORDER — FENTANYL CITRATE 50 UG/ML
INJECTION, SOLUTION INTRAMUSCULAR; INTRAVENOUS
Status: DISPENSED
Start: 2021-07-22

## (undated) RX ORDER — DEXAMETHASONE SODIUM PHOSPHATE 4 MG/ML
INJECTION, SOLUTION INTRA-ARTICULAR; INTRALESIONAL; INTRAMUSCULAR; INTRAVENOUS; SOFT TISSUE
Status: DISPENSED
Start: 2019-10-31

## (undated) RX ORDER — CLINDAMYCIN PHOSPHATE 600 MG/50ML
INJECTION, SOLUTION INTRAVENOUS
Status: DISPENSED
Start: 2021-01-07

## (undated) RX ORDER — LIDOCAINE HYDROCHLORIDE 10 MG/ML
INJECTION, SOLUTION EPIDURAL; INFILTRATION; INTRACAUDAL; PERINEURAL
Status: DISPENSED
Start: 2018-01-16

## (undated) RX ORDER — FENTANYL CITRATE 50 UG/ML
INJECTION, SOLUTION INTRAMUSCULAR; INTRAVENOUS
Status: DISPENSED
Start: 2017-11-26

## (undated) RX ORDER — HYDROMORPHONE HCL/0.9% NACL/PF 0.2MG/0.2
SYRINGE (ML) INTRAVENOUS
Status: DISPENSED
Start: 2018-01-16

## (undated) RX ORDER — FENTANYL CITRATE 50 UG/ML
INJECTION, SOLUTION INTRAMUSCULAR; INTRAVENOUS
Status: DISPENSED
Start: 2020-10-08

## (undated) RX ORDER — FENTANYL CITRATE 50 UG/ML
INJECTION, SOLUTION INTRAMUSCULAR; INTRAVENOUS
Status: DISPENSED
Start: 2020-04-10

## (undated) RX ORDER — ACETAMINOPHEN 325 MG/1
TABLET ORAL
Status: DISPENSED
Start: 2020-10-08

## (undated) RX ORDER — CIPROFLOXACIN 500 MG/1
TABLET, FILM COATED ORAL
Status: DISPENSED
Start: 2018-03-01

## (undated) RX ORDER — NICOTINE POLACRILEX 4 MG
LOZENGE BUCCAL
Status: DISPENSED
Start: 2018-01-16

## (undated) RX ORDER — IPRATROPIUM BROMIDE AND ALBUTEROL SULFATE 2.5; .5 MG/3ML; MG/3ML
SOLUTION RESPIRATORY (INHALATION)
Status: DISPENSED
Start: 2019-10-31

## (undated) RX ORDER — FENTANYL CITRATE 50 UG/ML
INJECTION, SOLUTION INTRAMUSCULAR; INTRAVENOUS
Status: DISPENSED
Start: 2018-01-10

## (undated) RX ORDER — CIPROFLOXACIN 2 MG/ML
INJECTION, SOLUTION INTRAVENOUS
Status: DISPENSED
Start: 2021-01-07

## (undated) RX ORDER — SULFAMETHOXAZOLE/TRIMETHOPRIM 800-160 MG
TABLET ORAL
Status: DISPENSED
Start: 2019-10-28

## (undated) RX ORDER — CEFAZOLIN SODIUM 2 G/100ML
INJECTION, SOLUTION INTRAVENOUS
Status: DISPENSED
Start: 2020-05-21

## (undated) RX ORDER — OXYCODONE HYDROCHLORIDE 5 MG/1
TABLET ORAL
Status: DISPENSED
Start: 2020-05-21

## (undated) RX ORDER — REGADENOSON 0.08 MG/ML
INJECTION, SOLUTION INTRAVENOUS
Status: DISPENSED
Start: 2017-10-02

## (undated) RX ORDER — PROPOFOL 10 MG/ML
INJECTION, EMULSION INTRAVENOUS
Status: DISPENSED
Start: 2019-10-31